# Patient Record
Sex: FEMALE | Race: WHITE | NOT HISPANIC OR LATINO | Employment: UNEMPLOYED | URBAN - METROPOLITAN AREA
[De-identification: names, ages, dates, MRNs, and addresses within clinical notes are randomized per-mention and may not be internally consistent; named-entity substitution may affect disease eponyms.]

---

## 2017-01-05 ENCOUNTER — ALLSCRIPTS OFFICE VISIT (OUTPATIENT)
Dept: OTHER | Facility: OTHER | Age: 27
End: 2017-01-05

## 2017-01-05 DIAGNOSIS — N64.4 MASTODYNIA: ICD-10-CM

## 2017-01-05 DIAGNOSIS — N39.0 URINARY TRACT INFECTION: ICD-10-CM

## 2017-01-05 DIAGNOSIS — G43.109 MIGRAINE WITH AURA AND WITHOUT STATUS MIGRAINOSUS, NOT INTRACTABLE: ICD-10-CM

## 2017-01-05 DIAGNOSIS — R11.0 NAUSEA: ICD-10-CM

## 2017-01-10 ENCOUNTER — ALLSCRIPTS OFFICE VISIT (OUTPATIENT)
Dept: OTHER | Facility: OTHER | Age: 27
End: 2017-01-10

## 2017-01-10 ENCOUNTER — APPOINTMENT (OUTPATIENT)
Dept: LAB | Facility: HOSPITAL | Age: 27
End: 2017-01-10
Payer: COMMERCIAL

## 2017-01-10 DIAGNOSIS — N39.0 URINARY TRACT INFECTION: ICD-10-CM

## 2017-01-10 DIAGNOSIS — R11.0 NAUSEA: ICD-10-CM

## 2017-01-10 LAB
BACTERIA UR QL AUTO: ABNORMAL /HPF
BILIRUB UR QL STRIP: NEGATIVE
CLARITY UR: ABNORMAL
COLOR UR: YELLOW
GLUCOSE UR STRIP-MCNC: NEGATIVE MG/DL
HGB UR QL STRIP.AUTO: NEGATIVE
KETONES UR STRIP-MCNC: NEGATIVE MG/DL
LEUKOCYTE ESTERASE UR QL STRIP: ABNORMAL
NITRITE UR QL STRIP: NEGATIVE
NON-SQ EPI CELLS URNS QL MICRO: ABNORMAL /HPF
PH UR STRIP.AUTO: 5.5 [PH] (ref 4.5–8)
PROT UR STRIP-MCNC: NEGATIVE MG/DL
RBC #/AREA URNS AUTO: ABNORMAL /HPF
SP GR UR STRIP.AUTO: 1.02 (ref 1–1.03)
UROBILINOGEN UR QL STRIP.AUTO: 0.2 E.U./DL
WBC #/AREA URNS AUTO: ABNORMAL /HPF

## 2017-01-10 PROCEDURE — 81001 URINALYSIS AUTO W/SCOPE: CPT

## 2017-01-10 PROCEDURE — 87086 URINE CULTURE/COLONY COUNT: CPT

## 2017-01-11 ENCOUNTER — GENERIC CONVERSION - ENCOUNTER (OUTPATIENT)
Dept: OTHER | Facility: OTHER | Age: 27
End: 2017-01-11

## 2017-01-12 LAB — BACTERIA UR CULT: NORMAL

## 2017-01-17 ENCOUNTER — ALLSCRIPTS OFFICE VISIT (OUTPATIENT)
Dept: OTHER | Facility: OTHER | Age: 27
End: 2017-01-17

## 2017-01-30 ENCOUNTER — ALLSCRIPTS OFFICE VISIT (OUTPATIENT)
Dept: OTHER | Facility: OTHER | Age: 27
End: 2017-01-30

## 2017-02-22 ENCOUNTER — ALLSCRIPTS OFFICE VISIT (OUTPATIENT)
Dept: OTHER | Facility: OTHER | Age: 27
End: 2017-02-22

## 2017-02-22 DIAGNOSIS — E53.8 DEFICIENCY OF OTHER SPECIFIED B GROUP VITAMINS: ICD-10-CM

## 2017-04-05 ENCOUNTER — ALLSCRIPTS OFFICE VISIT (OUTPATIENT)
Dept: OTHER | Facility: OTHER | Age: 27
End: 2017-04-05

## 2017-04-12 ENCOUNTER — ALLSCRIPTS OFFICE VISIT (OUTPATIENT)
Dept: RADIOLOGY | Facility: MEDICAL CENTER | Age: 27
End: 2017-04-12
Payer: COMMERCIAL

## 2017-04-19 ENCOUNTER — ALLSCRIPTS OFFICE VISIT (OUTPATIENT)
Dept: OTHER | Facility: OTHER | Age: 27
End: 2017-04-19

## 2017-05-01 ENCOUNTER — GENERIC CONVERSION - ENCOUNTER (OUTPATIENT)
Dept: OTHER | Facility: OTHER | Age: 27
End: 2017-05-01

## 2017-05-03 ENCOUNTER — ALLSCRIPTS OFFICE VISIT (OUTPATIENT)
Dept: OTHER | Facility: OTHER | Age: 27
End: 2017-05-03

## 2017-05-03 DIAGNOSIS — G43.111 INTRACTABLE MIGRAINE WITH AURA WITH STATUS MIGRAINOSUS: ICD-10-CM

## 2017-05-03 DIAGNOSIS — R47.81 SLURRED SPEECH: ICD-10-CM

## 2017-05-03 DIAGNOSIS — R20.2 PARESTHESIA OF SKIN: ICD-10-CM

## 2017-05-05 ENCOUNTER — TRANSCRIBE ORDERS (OUTPATIENT)
Dept: ADMINISTRATIVE | Facility: HOSPITAL | Age: 27
End: 2017-05-05

## 2017-05-05 ENCOUNTER — ALLSCRIPTS OFFICE VISIT (OUTPATIENT)
Dept: OTHER | Facility: OTHER | Age: 27
End: 2017-05-05

## 2017-05-05 DIAGNOSIS — G43.111 MIGRAINE WITH AURA, WITH INTRACTABLE MIGRAINE, SO STATED, WITH STATUS MIGRAINOSUS: Primary | ICD-10-CM

## 2017-05-05 DIAGNOSIS — M25.422 EFFUSION OF LEFT ELBOW: ICD-10-CM

## 2017-05-05 DIAGNOSIS — R20.2 PARESTHESIA: ICD-10-CM

## 2017-05-05 DIAGNOSIS — M25.522 PAIN IN LEFT ELBOW: ICD-10-CM

## 2017-05-10 ENCOUNTER — ALLSCRIPTS OFFICE VISIT (OUTPATIENT)
Dept: OTHER | Facility: OTHER | Age: 27
End: 2017-05-10

## 2017-05-12 ENCOUNTER — HOSPITAL ENCOUNTER (OUTPATIENT)
Dept: MRI IMAGING | Facility: HOSPITAL | Age: 27
Discharge: HOME | End: 2017-05-12
Payer: COMMERCIAL

## 2017-05-12 DIAGNOSIS — R20.2 PARESTHESIA OF SKIN: ICD-10-CM

## 2017-05-12 DIAGNOSIS — R47.81 SLURRED SPEECH: ICD-10-CM

## 2017-05-12 DIAGNOSIS — G43.111 INTRACTABLE MIGRAINE WITH AURA WITH STATUS MIGRAINOSUS: ICD-10-CM

## 2017-05-12 PROCEDURE — A9585 GADOBUTROL INJECTION: HCPCS | Performed by: FAMILY MEDICINE

## 2017-05-12 PROCEDURE — 70553 MRI BRAIN STEM W/O & W/DYE: CPT

## 2017-05-12 RX ADMIN — GADOBUTROL 8 ML: 604.72 INJECTION INTRAVENOUS at 21:53

## 2017-05-16 ENCOUNTER — GENERIC CONVERSION - ENCOUNTER (OUTPATIENT)
Dept: OTHER | Facility: OTHER | Age: 27
End: 2017-05-16

## 2017-05-18 ENCOUNTER — ALLSCRIPTS OFFICE VISIT (OUTPATIENT)
Dept: OTHER | Facility: OTHER | Age: 27
End: 2017-05-18

## 2017-05-25 ENCOUNTER — ALLSCRIPTS OFFICE VISIT (OUTPATIENT)
Dept: OTHER | Facility: OTHER | Age: 27
End: 2017-05-25

## 2017-06-22 ENCOUNTER — ALLSCRIPTS OFFICE VISIT (OUTPATIENT)
Dept: OTHER | Facility: OTHER | Age: 27
End: 2017-06-22

## 2017-06-26 ENCOUNTER — ALLSCRIPTS OFFICE VISIT (OUTPATIENT)
Dept: OTHER | Facility: OTHER | Age: 27
End: 2017-06-26

## 2017-06-26 PROCEDURE — G0145 SCR C/V CYTO,THINLAYER,RESCR: HCPCS | Performed by: NURSE PRACTITIONER

## 2017-06-26 PROCEDURE — 87624 HPV HI-RISK TYP POOLED RSLT: CPT | Performed by: NURSE PRACTITIONER

## 2017-06-27 ENCOUNTER — LAB REQUISITION (OUTPATIENT)
Dept: LAB | Facility: HOSPITAL | Age: 27
End: 2017-06-27
Payer: COMMERCIAL

## 2017-06-27 DIAGNOSIS — R87.629: ICD-10-CM

## 2017-06-29 LAB — HPV RRNA GENITAL QL NAA+PROBE: NORMAL

## 2017-07-06 LAB
LAB AP GYN PRIMARY INTERPRETATION: NORMAL
Lab: NORMAL

## 2017-08-16 ENCOUNTER — GENERIC CONVERSION - ENCOUNTER (OUTPATIENT)
Dept: OTHER | Facility: OTHER | Age: 27
End: 2017-08-16

## 2017-08-21 ENCOUNTER — ALLSCRIPTS OFFICE VISIT (OUTPATIENT)
Dept: OTHER | Facility: OTHER | Age: 27
End: 2017-08-21

## 2017-08-21 DIAGNOSIS — G43.109 MIGRAINE WITH AURA AND WITHOUT STATUS MIGRAINOSUS, NOT INTRACTABLE: ICD-10-CM

## 2017-08-21 DIAGNOSIS — E78.5 HYPERLIPIDEMIA: ICD-10-CM

## 2017-08-21 DIAGNOSIS — E53.8 DEFICIENCY OF OTHER SPECIFIED B GROUP VITAMINS: ICD-10-CM

## 2017-08-21 DIAGNOSIS — F41.9 ANXIETY DISORDER: ICD-10-CM

## 2017-08-29 ENCOUNTER — ALLSCRIPTS OFFICE VISIT (OUTPATIENT)
Dept: OTHER | Facility: OTHER | Age: 27
End: 2017-08-29

## 2017-09-19 ENCOUNTER — GENERIC CONVERSION - ENCOUNTER (OUTPATIENT)
Dept: OTHER | Facility: OTHER | Age: 27
End: 2017-09-19

## 2017-09-25 ENCOUNTER — ALLSCRIPTS OFFICE VISIT (OUTPATIENT)
Dept: OTHER | Facility: OTHER | Age: 27
End: 2017-09-25

## 2017-09-25 ENCOUNTER — GENERIC CONVERSION - ENCOUNTER (OUTPATIENT)
Dept: OTHER | Facility: OTHER | Age: 27
End: 2017-09-25

## 2017-10-10 ENCOUNTER — GENERIC CONVERSION - ENCOUNTER (OUTPATIENT)
Dept: OTHER | Facility: OTHER | Age: 27
End: 2017-10-10

## 2017-10-18 ENCOUNTER — ALLSCRIPTS OFFICE VISIT (OUTPATIENT)
Dept: OTHER | Facility: OTHER | Age: 27
End: 2017-10-18

## 2017-10-19 NOTE — PROGRESS NOTES
Assessment  1  Migraine with aura and without status migrainosus, not intractable (346 00) (G43 109)    Plan  Migraine with aura and without status migrainosus, not intractable    · Eletriptan Hydrobromide 40 MG Oral Tablet (Relpax); TAKE 1 TABLET AT ONSET  OF MIGRAINE  MAY REPEAT IN  2 HOURS  MAX 2 DOSES/24 HOURS, NO MORE THAN  3 DAYS PER WEEK  · PredniSONE 10 MG Oral Tablet; Take 6 pills x 3 days, 4 pills x 3 days, 2 pills x  3 days, then 1 pill x 3 days   · Demerol 100 MG/2ML Injection Solution; 100MG IM; To Be Done: 17OSJ5313   · Follow Up if Not Better Evaluation and Treatment  Follow-up  Status: Complete  Done:  92SER6020 10:28AM    Discussion/Summary  Possible side effects of new medications were reviewed with the patient/guardian today  The treatment plan was reviewed with the patient/guardian  The patient/guardian understands and agrees with the treatment plan      Chief Complaint  migraine x several days  pt had depakote called in, but made her schizi  History of Present Illness  HPI: Patient is here with headache over the past week  Pain bilaterally  Can be throbbing/pulsatile or stabbing  No diplopia  Patient with left eye redness intermittently and pain  No vomiting  No fever  No other URI symptoms  Mild photophobia  Patient tried sumatriptan and naproxen without any improvement  Patient tried Depakote and had side effects  Patient stopped Depakote  No alcohol or drug use  Review of Systems    Constitutional: as noted in HPI    ENT: as noted in HPI  Cardiovascular: no complaints of slow or fast heart rate, no chest pain, no palpitations, no leg claudication or lower extremity edema  Respiratory: no complaints of shortness of breath, no wheezing, no dyspnea on exertion, no orthopnea or PND  Breasts: no complaints of breast pain, breast lump or nipple discharge  Gastrointestinal: as noted in HPI     Genitourinary: no complaints of dysuria, no incontinence, no pelvic pain, no dysmenorrhea, no vaginal discharge or abnormal vaginal bleeding  Musculoskeletal: as noted in HPI  Integumentary: no complaints of skin rash or lesion, no itching or dry skin, no skin wounds  Neurological: headache, but-- as noted in HPI  Active Problems  1  Abdominal pain (789 00) (R10 9)   2  Abnormal Pap smear of vagina (795 10) (R87 629)   3  Acute bronchitis (466 0) (J20 9)   4  Acute maxillary sinusitis, recurrence not specified (461 0) (J01 00)   5  Allergy to insect bites and stings (V15 06) (Z91 038)   6  Anxiety disorder (300 00) (F41 9)   7  Bacterial vaginosis (616 10,041 9) (N76 0,B96 89)   8  Breast pain (611 71) (N64 4)   9  Candidiasis (112 9) (B37 9)   10  Cat bite (879 8,E906 3) (W55 01XA)   11  Cellulitis (682 9) (L03 90)   12  Cervical radiculitis (723 4) (M54 12)   13  Chronic migraine (346 70) (G43 709)   14  Constipation (564 00) (K59 00)   15  Contact dermatitis (692 9) (L25 9)   16  Counseling for sexually transmitted disease (V65 45) (Z70 8)   17  Dehydration (276 51) (E86 0)   18  Dermatitis (692 9) (L30 9)   19  Diarrhea (787 91) (R19 7)   20  Difficulty voiding (788 99) (R39 198)   21  Dizziness (780 4) (R42)   22  Encounter for counseling regarding initiation of other contraceptive measure (V25 02)    (Z30 09)   23  Encounter for gynecological examination with Papanicolaou smear of cervix (V72 31)    (Z01 419)   24  Encounter for gynecological examination without abnormal finding (V72 31) (Z01 419)   25  Essential tremor (333 1) (G25 0)   26  Foot Pain (Soft Tissue) (729 5)   27  Hair loss (704 00) (L65 9)   28  Headache (784 0) (R51)   29  Hematuria (599 70) (R31 9)   30  Hip pain, left (719 45) (M25 552)   31  House dust mite allergy (V15 09) (Z91 09)   32  Hyperlipidemia (272 4) (E78 5)   33  Incomplete emptying of bladder (788 21) (R33 9)   34  Infected tooth (522 4) (K04 7)   35  Irregular menses (626 4) (N92 6)   36  Left elbow pain (719 42) (M25 522)   37   Local reaction to insect sting, initial encounter   38  Lumbar disc herniation (722 10) (M51 26)   39  Lumbar pain (724 2) (M54 5)   40  Lumbar radiculopathy (724 4) (M54 16)   41  Migraine (346 90) (G43 909)   42  Migraine with aura and without status migrainosus, not intractable (346 00) (G43 109)   43  Muscle pain, myofascial (729 1) (M79 1)   44  Nausea (787 02) (R11 0)   45  Need for HPV vaccination (V04 89) (Z23)   46  Obesity (278 00) (E66 9)   47  Persistent headaches (784 0) (R51)   48  Pigmented nevus (216 9) (D22 9)   49  Pilar cysts (704 41) (L72 11)   50  Premenstrual tension syndrome (625 4) (N94 3)   51  Sacroiliitis (720 2) (M46 1)   52  Seasonal allergies (477 9) (J30 2)   53  Shoulder strain (840 9) (S46 919A)   54  Skin nodule (782 2) (R22 9)   55  Slurred speech (784 59) (R47 81)   56  Suprapubic pain, acute (789 09,338 19) (R10 2)   57  Swelling of left elbow (719 02) (M25 422)   58  Tingling (782 0) (R20 2)   59  Trapezius muscle spasm (728 85) (M62 838)   60  Ulcerative colitis without complications (009 3) (F18 24)   61  Upper respiratory infection (465 9) (J06 9)   62  Urgency of urination (788 63) (R39 15)   63  Urinary frequency (788 41) (R35 0)   64  Urinary tract infection (599 0) (N39 0)   65  URTI (acute upper respiratory infection) (465 9) (J06 9)   66  Uses birth control (V25 9) (Z30 9)   67  Vaginal candidiasis (112 1) (B37 3)   68  Vaginal discharge (623 5) (N89 8)   69  Vitamin B12 deficiency (266 2) (E53 8)   70  Vitamin D deficiency (268 9) (E55 9)   71  Voiding dysfunction (599 9) (N39 8)   72  Weight gain (783 1) (R63 5)    Past Medical History  1  History of Depression (311) (F32 9)   2  History of Ear infection (382 9) (H66 90)   3  History of seasonal allergies (V15 09) (Z88 9)   4  History of tonsillitis (V12 69) (Z87 09)   5  History of Sinus pain (478 19) (J34 89)   6  History of Strain of left biceps (840 8) (S46 212A)   7   History of Strain of trapezius muscle, left, initial encounter (840 8) (J43 925Y)  Active Problems And Past Medical History Reviewed: The active problems and past medical history were reviewed and updated today  Family History  Father    1  Family history of    2  Family history of Family Health Status Of Father -    3  Family history of Sjogren's disease (V17 89) (Z82 69)  Paternal Grandmother    3  Family history of   Maternal Grandfather    5  Family history of   Paternal Grandfather    10  Family history of   Other    7  Family history of Cancer   8  Family history of Diabetes   9  Family history of High blood pressure    Social History   · High school graduate   · Never A Smoker   · Never Drank Alcohol   · No illicit drug use   · Single   · Uses birth control (V25 9) (Z30 9)    Surgical History  1  History of Oral Surgery   2  History of Tonsillectomy    Current Meds   1  Botox 100 UNIT Injection Solution Reconstituted; INJECT 200  UNIT Other; Therapy: 03Iff8402 to (Evaluate:2017)  Requested for: 78Hdp4521; Last   Rx:52Hhb2386 Ordered   2  Gabapentin 300 MG Oral Capsule; TAKE 2 CAPSULE Bedtime; Therapy: 03XDK3374 to (Evaluate:45Wvi4655)  Requested for: 73Wyn0663; Last   Rx:55Thy7244 Ordered   3  LORazepam 0 5 MG Oral Tablet; TAKE 1/2 TO 1 TABLET TWICE DAILY AS NEEDED; Therapy: 99ODC9381 to (Evaluate:2016); Last Rx:65Kdk0310 Ordered   4  Montelukast Sodium 10 MG Oral Tablet; TAKE 1 TABLET DAILY  Requested for:   38Noz1052; Last Rx:05Syt9847 Ordered   5  Naproxen 500 MG Oral Tablet; TAKE 1 TABLET TWICE DAILY; Therapy: 98YHD9664 to (Evaluate:2017)  Requested for: 95Htz3352; Last   Rx:38Auo2933 Ordered   6  NuvaRing 0 12-0 015 MG/24HR Vaginal Ring; One ring per vagina x 3 weeks, remove x   4 days, repeat; Therapy: 38JIE3969 to (Evaluate:2018)  Requested for: 36EMK0836; Last   Rx:2017 Ordered   7  OLANZapine 5 MG Oral Tablet; 1 po qd x 5 days;    Therapy: 81MLH5846 to [phone]) Requested for: 54KCZ4700; Last   Rx:73Dfg5067 Ordered   8  Ondansetron 4 MG Oral Tablet Disintegrating; Take 1 by mouth every eight hours as   needed for nausea; Therapy: 63Ksr4710 to (Last Rx:62Lns7165)  Requested for: 93Cil8864 Ordered   9  Prochlorperazine Maleate 10 MG Oral Tablet; TAKE 1 TABLET EVERY 6 HOURS AS   NEEDED FOR migraine, Max 3/day, Max 3X/week; Therapy: 55WBR8735 to (Evaluate:62Wvn3751)  Requested for: 19TLI3856; Last   Rx:22Jun2017 Ordered   10  Promethazine HCl 25 MG/ML Injection Solution; INJECT INTRAMUSCULARLY AS    DIRECTED; To Be Done: 69ENS2618; Status: HOLD FOR - Administration Ordered   11  Propranolol HCl ER 60 MG Oral Capsule Extended Release 24 Hour; Therapy: 65KKB6257 to Recorded   12  Sertraline HCl - 100 MG Oral Tablet; TAKE 2 TABLETS DAILY; Therapy: 99IKM3798 to (Rosaura Reilly)  Requested for: 35Jrb6625; Last    Rx:51Nfg3012 Ordered   13  SUMAtriptan Succinate 100 MG Oral Tablet; TAKE 1 TABLET FOR MIGRAINE RELIEF     MAY REPEAT 2 HOURS LATER  MAXIMUM 200MG/DAY; Therapy: 03Eod1317 to (Last Renetta Dasilva)  Requested for: 98Ojc8753 Ordered   14  Topiramate 50 MG Oral Tablet; TAKE 3 TABLET Bedtime; Therapy: 79STY4042 to (QYHITWHQ:83MAB7594)  Requested for: 89LQY9188; Last    Rx:71Wus0043 Ordered   15  Vitamin D 2000 UNIT Oral Capsule; take 1 capsule daily; Therapy: 42KEL6597 to Recorded   16  Vitamin D3 78370 UNIT Oral Capsule; TAKE 1 CAPSULE Weekly; Therapy: 38DNT7298 to (Last Rx:88Dpt5445)  Requested for: 44Eek8416 Ordered    The medication list was reviewed and updated today  Allergies  1  Depakote TBEC   2  Pristiq TB24    Vitals   Recorded: 41TPJ8878 99:56SU   Systolic 90    Diastolic 70    Patient Refused Height Yes Yes   Weight Unobtainable Yes      Physical Exam    Constitutional   General appearance: Abnormal     Eyes   Conjunctiva and lids: No swelling, erythema or discharge  Pupils and irises: Equal, round and reactive to light      Ears, Nose, Mouth, and Throat   External inspection of ears and nose: Normal     Otoscopic examination: Tympanic membranes translucent with normal light reflex  Canals patent without erythema  Nasal mucosa, septum, and turbinates: Normal without edema or erythema  Oropharynx: Normal with no erythema, edema, exudate or lesions  Pulmonary   Respiratory effort: No increased work of breathing or signs of respiratory distress  Auscultation of lungs: Clear to auscultation  Cardiovascular   Palpation of heart: Normal PMI, no thrills  Auscultation of heart: Normal rate and rhythm, normal S1 and S2, without murmurs  Examination of extremities for edema and/or varicosities: Normal     Lymphatic   Palpation of lymph nodes in neck: No lymphadenopathy  Musculoskeletal   Gait and station: Normal     Digits and nails: Normal without clubbing or cyanosis  Inspection/palpation of joints, bones, and muscles: Normal     Skin   Skin and subcutaneous tissue: Normal without rashes or lesions  Neurologic   Cranial nerves: Cranial nerves 2-12 intact  Reflexes: 2+ and symmetric  Sensation: No sensory loss      Psychiatric   Orientation to person, place, and time: Normal     Mood and affect: Normal          Future Appointments    Date/Time Provider Specialty Site   03/27/2018 10:00 AM Bonnie Dennison MD Neurology ST [address] [facility]   11/28/2017 08:30 AM Jairo Lowe [facility] Neurology [facility]  [address] [facility]   01/29/2018 09:45 AM TENZIN Cosby Pain Management [facility] SPINE   12/22/2017 09:30 AM Tg Negrete DO Family Medicine  [address] [place] [address]     Signatures   Electronically signed by : Kaleb Palm DO; Oct 18 2017 10:28AM EST                       (Author)

## 2017-11-17 ENCOUNTER — GENERIC CONVERSION - ENCOUNTER (OUTPATIENT)
Dept: OTHER | Facility: OTHER | Age: 27
End: 2017-11-17

## 2017-11-28 ENCOUNTER — ALLSCRIPTS OFFICE VISIT (OUTPATIENT)
Dept: OTHER | Facility: OTHER | Age: 27
End: 2017-11-28

## 2017-11-29 NOTE — PROGRESS NOTES
Chief Complaint  Patient present for second Botox injection  Current Meds   1  Amoxicillin-Pot Clavulanate 875-125 MG Oral Tablet; Take one tablet twice daily for 7 days; Therapy: 83FFC7326 to (Last Rx:17Nov2017)  Requested for: 85OPR2022 Ordered   2  Botox 100 UNIT Injection Solution Reconstituted; INJECT 200  UNIT Other; Therapy: 15Paf0411 to (Evaluate:27Nov2017)  Requested for: 94Sfw4601; Last Rx:42Iby2099 Ordered   3  Bromfed DM 30-2-10 MG/5ML Oral Syrup; TAKE 5 ML EVERY 4 TO 6 HOURS AS NEEDED; Therapy: 86NQR5799 to (Igor Webb)  Requested for: 44WQJ7259; Last Rx:17Nov2017 Ordered   4  Eletriptan Hydrobromide 40 MG Oral Tablet; TAKE 1 TABLET AT ONSET OF MIGRAINE  MAY REPEAT IN  2 HOURS  MAX 2 DOSES/24 HOURS, NO MORE THAN 3 DAYS PER WEEK ; Therapy: 79IBP1568 to (Last Rx:18Oct2017)  Requested for: 49HKP0328 Ordered   5  Gabapentin 300 MG Oral Capsule; TAKE 2 CAPSULE Bedtime; Therapy: 55FQD1081 to (Evaluate:67Lpy6195)  Requested for: 03Dtd2231; Last Rx:93Ryp9028 Ordered   6  LORazepam 0 5 MG Oral Tablet; TAKE 1/2 TO 1 TABLET TWICE DAILY AS NEEDED; Therapy: 07REP3546 to (Evaluate:11Oct2016); Last Rx:65Nqx4696 Ordered   7  Montelukast Sodium 10 MG Oral Tablet; TAKE 1 TABLET DAILY  Requested for: 21Orr3406; Last Rx:28Dfc2577 Ordered   8  Naproxen 500 MG Oral Tablet; TAKE 1 TABLET TWICE DAILY; Therapy: 41FFL4013 to (Evaluate:35Akx7550)  Requested for: 91Vkz1495; Last Rx:13Cnl8704 Ordered   9  NuvaRing 0 12-0 015 MG/24HR Vaginal Ring; One ring per vagina x 3 weeks, remove x 4 days, repeat; Therapy: 99CNB6637 to (ZKICFRLF:12DJJ5687)  Requested for: 16MDS7020; Last QB:30JQH5483 Ordered   10  OLANZapine 5 MG Oral Tablet; 1 po qd x 5 days; Therapy: 01QIW1895 to [phone])  Requested for: 50SFB2697; Last  Rx:17Oct2017 Ordered   11  Ondansetron 4 MG Oral Tablet Disintegrating; Take 1 by mouth every eight hours as  needed for nausea;   Therapy: 51Ukk0730 to (Last Rx:14Xfm0470)  Requested for: 24Pmc9678 Ordered   12  PredniSONE 10 MG Oral Tablet; Take 6 pills x 3 days, 4 pills x 3 days, 2 pills x 3  days, then 1 pill x 3 days; Therapy: 76IJQ9097 to (Evaluate:17Nov2017)  Requested for: 06ZYJ5807; Last  Rx:18Oct2017 Ordered   13  Prochlorperazine Maleate 10 MG Oral Tablet; TAKE 1 TABLET EVERY 6 HOURS AS  NEEDED FOR migraine, Max 3/day, Max 3X/week; Therapy: 01OVK9569 to (Evaluate:78Dbg0119)  Requested for: 21XYR8796; Last  Rx:22Jun2017 Ordered   14  Promethazine HCl 25 MG/ML Injection Solution; INJECT INTRAMUSCULARLY AS  DIRECTED; To Be Done: 34VIK4532; Status: HOLD FOR - Administration Ordered   15  Propranolol HCl ER 60 MG Oral Capsule Extended Release 24 Hour; Therapy: 10GIX1177 to Recorded   16  Sertraline HCl - 100 MG Oral Tablet; TAKE 2 TABLETS DAILY; Therapy: 46VFE8168 to ()  Requested for: 70Rzs8866; Last  Rx:79Eej3274 Ordered   17  SUMAtriptan Succinate 100 MG Oral Tablet; TAKE 1 TABLET FOR MIGRAINE RELIEF  MAY  REPEAT 2 HOURS LATER  MAXIMUM 200MG/DAY; Therapy: 69Udt5573 to (Last Akilah Field)  Requested for: 00Wsr8123 Ordered   18  Topiramate 50 MG Oral Tablet; TAKE 3 TABLET Bedtime; Therapy: 57BGH4114 to (IQTGACSB:46JTY9781)  Requested for: 05AYE7323; Last  Rx:03Kdt6847 Ordered   19  Vitamin D 2000 UNIT Oral Capsule; take 1 capsule daily; Therapy: 74CVW1896 to Recorded   20  Vitamin D3 33415 UNIT Oral Capsule; TAKE 1 CAPSULE Weekly; Therapy: 29SEP0542 to (Last Rx:12Rsq9631)  Requested for: 98Awi6711 Ordered    Allergies  1  Depakote TBEC   2  Pristiq TB24    Vitals   Recorded: 59MJY6937 08:04AM   Temperature 98 3 F   Heart Rate 75   Respiration 16   Systolic 759, RUE, Sitting   Diastolic 62, RUE, Sitting   Weight 172 lb 4 oz   BMI Calculated 30 51   BSA Calculated 1 81   O2 Saturation 97       Procedure  Procedure: Headache botox injection  Indication: Chronic migraine headache  Were discussed with the patient-- and-- parent  Written consent was obtained prior to the procedure  The site was prepped with an alcohol swab  Anesthesia: No anesthesia was needed  Procedure Note: The patient was placed in the upright position  5 unit(s) was injected into the procerus muscle  5 unit(s) was injected into the  right  muscle  5 unit(s) was injected into the  left  muscle  10 unit(s) was injected into the  right frontalis muscle  10 unit(s) was injected into the  left frontalis muscle  20 unit(s) was injected into the  right temporalis muscle  20 unit(s) was injected into the  left temporalis muscle  15 unit(s) was injected into the  right occipitalis muscle  15 unit(s) was injected into the  left occipitalis muscle  10 unit(s) was injected into the  right cervical paraspinal muscle  10 unit(s) was injected into the  left cervical paraspinal muscle  15 unit(s) was injected into the  right trapezius muscle  15 unit(s) was injected into the  left trapezius muscle  A total of 155units were used  A total of 45units were discarded  Botox Lot:  Lot number: [id number] -- Expiration date: APW3194    100 units/2cc saline x2      Assessment  1  Chronic migraine (346 70) (G43 709)    Plan  Chronic migraine    · Botox 100 UNIT Injection Solution Reconstituted   Rx By: Earl KWONG;Chronic migraine; Dose of 200 UNIT; Intramuscular; GILBERTO = N; Administered: 11/28/2017 8:29:00 AM   · Chemodenervation of muscles innervated by facial, trigeminal, c-spine, accessorynerves - POC; Status:Need Information - Financial Authorization; Requestedfor:67Mcm2491;    Perform: In Office; [phone]; Ordered;migraine; Ordered By:Jacki Perez;   · Follow-up visit in 3 months Evaluation and Treatment  Follow-up  Status: Hold For -Scheduling  Requested for: 28SXX7344   Ordered; For: Chronic migraine; Ordered By: Aneesh Chase Performed:  Due: 35NHJ3502; Last Updated By: Krystin Hollis; 11/28/2017 8:45:45 AM  Migraine with aura and without status migrainosus, not intractable    · Topiramate 50 MG Oral Tablet; TAKE 3 TABLET Bedtime   Rx By: Prabhjot Britton; Dispense: 90 Days ; #:270 Tablet; Refill: 1;For: Migraine with aura and without status migrainosus, not intractable; GILBERTO = N; Verified Transmission to [address] [place] [address]; Last Updated By: System, [employer]; 11/28/2017 8:38:42 AM    Discussion/Summary    Pt prefers [place]  Future Appointments    Date/Time Provider Specialty Site   03/27/2018 10:00 AM Raisa Lyman MD Neurology ST [address]   01/29/2018 09:45 AM TENZIN Mendoza Pain Management [facility]   12/22/2017 09:30 AM Suraj Ma DO Family Medicine  [facility]       Signatures   Electronically signed by :  Claudean Hack, [facility]; Nov 28 2017  8:49AM EST                       (Author)    Electronically signed by : Suzette Jewell MD; Nov 28 2017  4:26PM EST                       (Author)

## 2017-12-06 ENCOUNTER — GENERIC CONVERSION - ENCOUNTER (OUTPATIENT)
Dept: OTHER | Facility: OTHER | Age: 27
End: 2017-12-06

## 2017-12-06 DIAGNOSIS — R19.7 DIARRHEA: ICD-10-CM

## 2017-12-06 DIAGNOSIS — R10.12 LEFT UPPER QUADRANT PAIN: ICD-10-CM

## 2017-12-06 DIAGNOSIS — R10.11 RIGHT UPPER QUADRANT PAIN: ICD-10-CM

## 2017-12-06 DIAGNOSIS — M94.0 CHONDROCOSTAL JUNCTION SYNDROME: ICD-10-CM

## 2017-12-26 ENCOUNTER — TRANSCRIBE ORDERS (OUTPATIENT)
Dept: ADMINISTRATIVE | Facility: HOSPITAL | Age: 27
End: 2017-12-26

## 2017-12-26 DIAGNOSIS — R10.13 EPIGASTRIC PAIN: Primary | ICD-10-CM

## 2018-01-10 ENCOUNTER — HOSPITAL ENCOUNTER (OUTPATIENT)
Dept: CT IMAGING | Facility: HOSPITAL | Age: 28
Discharge: HOME | End: 2018-01-10
Payer: COMMERCIAL

## 2018-01-10 DIAGNOSIS — M94.0 CHONDROCOSTAL JUNCTION SYNDROME: ICD-10-CM

## 2018-01-10 DIAGNOSIS — R10.11 RIGHT UPPER QUADRANT PAIN: ICD-10-CM

## 2018-01-10 DIAGNOSIS — R10.12 LEFT UPPER QUADRANT PAIN: ICD-10-CM

## 2018-01-10 PROCEDURE — 74160 CT ABDOMEN W/CONTRAST: CPT

## 2018-01-10 RX ADMIN — IOHEXOL 100 ML: 350 INJECTION, SOLUTION INTRAVENOUS at 16:29

## 2018-01-11 NOTE — MISCELLANEOUS
Signatures   Electronically signed by : TENZIN Sanchez; Feb 24 2016 11:52AM EST                       (Author)    Electronically signed by : Alberto Beyer DO; Feb 24 2016  1:04PM EST

## 2018-01-11 NOTE — RESULT NOTES
Verified Results  (Q) THINPREP TIS PAP RFX HPV 56UUZ9077 12:00AM Marianela Chanda     Test Name Result Flag Reference   CLINICAL INFORMATION:      none given   LMP:      NONE GIVEN   PREV  PAP:      NONE GIVEN   PREV  BX:      NONE GIVEN   SOURCE:      [facility]   STATEMENT OF ADEQUACY:      Satisfactory for evaluation  Endocervical/transformation zone component  present  Age and/or menstrual status not provided   GENERAL CATEGORIZATION:  A    EPITHELIAL CELL ABNORMALITY   INTERPRETATION/RESULT:  A    Low Grade Squamous Intraepithelial Lesion (LSIL)   COMMENT:      This Pap test has been evaluated with computer  assisted technology    Suggest clinical correlation and follow-up as  clinically appropriate   CYTOTECHNOLOGIST:      SPS,CT(ASCP)  Ct screening location: [address] [place] [address]   PATHOLOGIST:      Zoe Lopez MD, (electronic signature)  Boarded in Anatomic and Clinical Pathology,  Cytopathology

## 2018-01-12 VITALS
WEIGHT: 172.25 LBS | DIASTOLIC BLOOD PRESSURE: 62 MMHG | SYSTOLIC BLOOD PRESSURE: 104 MMHG | RESPIRATION RATE: 16 BRPM | HEART RATE: 75 BPM | TEMPERATURE: 98.3 F | OXYGEN SATURATION: 97 % | BODY MASS INDEX: 30.51 KG/M2

## 2018-01-12 VITALS
DIASTOLIC BLOOD PRESSURE: 70 MMHG | BODY MASS INDEX: 31.92 KG/M2 | SYSTOLIC BLOOD PRESSURE: 118 MMHG | TEMPERATURE: 98.2 F | HEIGHT: 63 IN | WEIGHT: 180.13 LBS

## 2018-01-12 VITALS
BODY MASS INDEX: 32.14 KG/M2 | DIASTOLIC BLOOD PRESSURE: 70 MMHG | HEIGHT: 63 IN | SYSTOLIC BLOOD PRESSURE: 116 MMHG | WEIGHT: 181.38 LBS | TEMPERATURE: 98.8 F

## 2018-01-12 NOTE — MISCELLANEOUS
Message   Recorded as Task   Date: 10/17/2016 10:18 AM, Created By: Vinh Mistry   Task Name: Med Renewal Request   Assigned To: [id number] [address] clinical,Team   Regarding Patient: Tina Kelley, Status: Active   Comment:    Keyana Herring - 17 Oct 2016 10:18 AM     TASK CREATED  Caller: Self; Renew Medication; [phone] (Home)  Pt called requesting a refill of gabapentin 300mg tid to be sent to [address] [place] [address]  Pt has an sovs on 12/14 w/[facility] [facility]  - [facility] - 17 Oct 2016 11:01 AM     TASK REPLIED TO: Previously Assigned To [address] clinical,Team                      please see her script, not due for refill until December  she should have refills, needs to call pharmacy   Keyana Herring - 17 Oct 2016 11:08 AM     TASK EDITED       Pt aware & will call pharmacy for a refill  Active Problems    1  Abdominal pain (789 00) (R10 9)   2  Abnormal Pap smear of vagina (795 10) (R87 629)   3  Acute bronchitis (466 0) (J20 9)   4  Acute maxillary sinusitis, recurrence not specified (461 0) (J01 00)   5  Allergy to insect bites and stings (V15 06) (Z91 038)   6  Anxiety disorder (300 00) (F41 9)   7  Bacterial vaginosis (616 10,041 9) (N76 0,B96 89)   8  Candidiasis (112 9) (B37 9)   9  Cat bite (879 8,E906 3) (W55 01XA)   10  Cellulitis (682 9) (L03 90)   11  Cervical radiculitis (723 4) (M54 12)   12  Constipation (564 00) (K59 00)   13  Contact dermatitis (692 9) (L25 9)   14  Counseling for sexually transmitted disease (V65 45) (Z70 8)   15  Dermatitis (692 9) (L30 9)   16  Diarrhea (787 91) (R19 7)   17  Difficulty voiding (788 99) (R39 198)   18  Dizziness (780 4) (R42)   19  Encounter for counseling regarding initiation of other contraceptive measure (V25 02)    (Z30 9)   20  Encounter for gynecological examination without abnormal finding (V72 31) (Z01 419)   21  Essential tremor (333 1) (G25 0)   22  Foot Pain (Soft Tissue) (729 5)   23  Hair loss (704 00) (L65 9)   24  Headache (784 0) (R51)   25  Hematuria (599 70) (R31 9)   26  Hip pain, left (719 45) (M25 552)   27  Hyperlipidemia (272 4) (E78 5)   28  Incomplete emptying of bladder (788 21) (R33 9)   29  Infected tooth (522 4) (K04 7)   30  Local reaction to insect sting, initial encounter   31  Lumbar disc herniation (722 10) (M51 26)   32  Lumbar pain (724 2) (M54 5)   33  Lumbar radiculopathy (724 4) (M54 16)   34  Muscle pain, myofascial (729 1) (M79 1)   35  Obesity (278 00) (E66 9)   36  Pigmented nevus (216 9) (D22 9)   37  Pilar cysts (704 41) (L72 11)   38  Premenstrual tension syndrome (625 4) (N94 3)   39  Seasonal allergies (477 9) (J30 2)   40  Shoulder strain (840 9) (S46 919A)   41  Skin nodule (782 2) (R22 9)   42  Strain of left biceps (840 8) (S46 112A)   43  Strain of trapezius muscle, left, initial encounter (840 8) (S46 812A)   44  Suprapubic pain, acute (789 09,338 19) (R10 2)   45  Trapezius muscle spasm (728 85) (M62 838)   46  Ulcerative colitis without complications (106 0) (S92 03)   47  Upper respiratory infection (465 9) (J06 9)   48  Urgency of urination (788 63) (R39 15)   49  Urinary frequency (788 41) (R35 0)   50  Urinary tract infection (599 0) (N39 0)   51  URTI (acute upper respiratory infection) (465 9) (J06 9)   52  Uses birth control (V25 9) (Z30 9)   53  Vaginal candidiasis (112 1) (B37 3)   54  Vaginal discharge (623 5) (N89 8)   55  Vitamin B12 deficiency (266 2) (E53 8)   56  Vitamin D deficiency (268 9) (E55 9)   57  Voiding dysfunction (599 9) (N39 8)   58  Weight gain (783 1) (R63 5)    Current Meds   1  Amoxicillin-Pot Clavulanate 875-125 MG Oral Tablet; TAKE 1 TABLET TWICE DAILY   AFTER MEALS; Therapy: 76HIE6377 to ([phone])  Requested for: 23FIY6884; Last   Rx:10Oct2016 Ordered   2  Betamethasone Valerate 0 1 % External Cream; APPLY SPARINGLY TO AFFECTED   AREA(S) ONCE DAILY; Therapy: 81VGD7320 to (Last Rx:26Vre1979)  Requested for: 14Mgy4382 Ordered   3  Fluticasone Propionate 50 MCG/ACT Nasal Suspension; USE 1 SPRAY IN EACH   NOSTRIL TWICE DAILY; Therapy: 69BIY4342 to (Evaluate:11Jan2017)  Requested for: 07NZF3119; Last   Rx:30Iac3832 Ordered   4  Gabapentin 300 MG Oral Capsule; TAKE ONE CAPSULE BY MOUTH THREE TIMES   DAILY; Therapy: 17HDR6787 to (Irene Iglesias)  Requested for: 69WON9599; Last   Rx:96Rac4143 Ordered   5  LORazepam 0 5 MG Oral Tablet; TAKE 1/2 TO 1 TABLET TWICE DAILY AS NEEDED; Therapy: 36RYX7214 to (Evaluate:11Oct2016); Last Rx:23Ink2035 Ordered   6  NuvaRing 0 12-0 015 MG/24HR Vaginal Ring; INSERT 1 RING VAGINALLY FOR 3   WEEKS THEN 1 WEEK OFF; Therapy: 30MMD3346 to (Last Dana Risser)  Requested for: 69HAJ0882 Ordered   7  Sertraline HCl - 100 MG Oral Tablet; TAKE 2 TABLETS DAILY; Therapy: 81INK1590 to (Evaluate:11Oct2016)  Requested for: 67FOU3026; Last   Rx:04Rta5158 Ordered   8  Simvastatin 10 MG Oral Tablet; TAKE 1 TABLET AT BEDTIME; Therapy: 65OQP0105 to (Brendon Samayoa)  Requested for: 83Cui9171; Last   Rx:80Uft1497 Ordered   9  SUMAtriptan Succinate 100 MG Oral Tablet; TAKE 1 TABLET FOR MIGRAINE RELIEF    MAY REPEAT 2 HOURS LATER  MAXIMUM 200MG/DAY; Therapy: 40Xtp8771 to (Last Rx:10Oct2016)  Requested for: 16HAA1467 Ordered   10  Vitamin D 2000 UNIT Oral Capsule; take 1 capsule daily; Therapy: 79GWR8219 to Recorded   11  Vitamin D3 45635 UNIT Oral Capsule; TAKE 1 CAPSULE Weekly; Therapy: 88HDM2541 to (Last Rx:52Jdb0320)  Requested for: 99Obd7731 Ordered    Allergies    1   Pristiq TB24    Signatures   Electronically signed by : Tariq Watt RN; Oct 17 2016 11:08AM EST                       (Author)

## 2018-01-12 NOTE — PROGRESS NOTES
Assessment    1  Bacterial vaginosis (616 10,041 9) (N76 0,A49 9)   2  Premenstrual tension syndrome (625 4) (N94 3)   3  Encounter for counseling regarding initiation of other contraceptive measure (V25 02)   (Z30 9)    Plan  Bacterial vaginosis    · Tinidazole 500 MG Oral Tablet; TAKE 2 TABLET Daily   Rx By: Hortensia Mock; Dispense: 3 Days ; #:6 Tablet; Refill: 0; For: Bacterial vaginosis; GILBERTO = N; Sent To: [facility]  Candidiasis, Encounter for counseling regarding initiation of other contraceptive  measure    · Sabrina Lanier; Status:Resulted - Requires Verification;   Done: 91BDN4053 03:07PM   Performed: In Office; UCV:96YEB3429;HRCIUQQ; Today; For:Candidiasis, Encounter for counseling regarding initiation of other contraceptive measure; Ordered By:Laura Hollis;    Discussion/Summary  Discussion Summary:   Discussed other options of hormonal contraception including LARC's  Pt desires trial of Nuvaring  Pt given instructions on use  Will start with Nuvaring samples  Pt also to start Tindamax  2 samples given and pt to  the remainder  Affirm sent  Chief Complaint  Chief Complaint Free Text Note Form: Pt presents today for an infection and new ocp  History of Present Illness  HPI: Pt here c/o recurrent bacterial infection  Pt reports discharge is yellowish and has an odor  States that flagyl took care of the last infection but it returned  States that sx's are better than last time  Pt also wants to change bcp's, thinks it is triggering menstrual migraines and mood changes, wants something more "stable"  Same sexual partner  No other changes in diet or activity  Review of Systems  Focused-Female:   Constitutional: No fever, no chills, feels well, no tiredness, no recent weight gain or loss  ENT: no ear ache, no loss of hearing, no nosebleeds or nasal discharge, no sore throat or hoarseness     Cardiovascular: no complaints of slow or fast heart rate, no chest pain, no palpitations, no leg claudication or lower extremity edema  Respiratory: no complaints of shortness of breath, no wheezing, no dyspnea on exertion, no orthopnea or PND  Breasts: no complaints of breast pain, breast lump or nipple discharge  Gastrointestinal: no complaints of abdominal pain, no constipation, no nausea or diarrhea, no vomiting, no bloody stools  Genitourinary: no complaints of dysuria, no incontinence, no pelvic pain, no dysmenorrhea, no vaginal discharge or abnormal vaginal bleeding and as noted in HPI  Musculoskeletal: no complaints of arthralgia, no myalgia, no joint swelling or stiffness, no limb pain or swelling  Integumentary: no complaints of skin rash or lesion, no itching or dry skin, no skin wounds  Neurological: no complaints of headache, no confusion, no numbness or tingling, no dizziness or fainting  ROS Reviewed:   ROS reviewed  Active Problems    1  Abdominal pain (789 00) (R10 9)   2  Acute bronchitis (466 0) (J20 9)   3  Allergy to insect bites and stings (V15 06) (Z91 038)   4  Anxiety disorder (300 00) (F41 9)   5  Bacterial vaginosis (616 10,041 9) (N76 0,A49 9)   6  Candidiasis (112 9) (B37 9)   7  Cellulitis (682 9) (L03 90)   8  Constipation (564 00) (K59 00)   9  Dermatitis (692 9) (L30 9)   10  Diarrhea (787 91) (R19 7)   11  Difficulty voiding (788 99) (R39 89)   12  Dizziness (780 4) (R42)   13  Encounter for counseling regarding initiation of other contraceptive measure (V25 02)    (Z30 9)   14  Essential tremor (333 1) (G25 0)   15  Foot Pain (Soft Tissue) (729 5)   16  Hematuria (599 70) (R31 9)   17  Hip pain, left (719 45) (M25 552)   18  Hyperlipidemia (272 4) (E78 5)   19  Incomplete emptying of bladder (788 21) (R33 9)   20  Infected tooth (522 4) (K04 7)   21  Local reaction to insect sting, initial encounter   22  Lumbar disc herniation (722 10) (M51 26)   23  Lumbar pain (724 2) (M54 5)   24  Lumbar radiculopathy (724 4) (M54 16)   25   Muscle pain, myofascial (729 1) (M79 7)   26  Pigmented nevus (216 9) (D22 9)   27  Pilar cysts (704 41) (L72 11)   28  Premenstrual tension syndrome (625 4) (N94 3)   29  Seasonal allergies (477 9) (J30 2)   30  Skin nodule (782 2) (R22 9)   31  Suprapubic pain, acute (789 09,338 19) (R10 30)   32  Ulcerative colitis without complications (371 8) (G24 06)   33  Upper respiratory infection (465 9) (J06 9)   34  Urgency of urination (788 63) (R39 15)   35  Urinary frequency (788 41) (R35 0)   36  Urinary tract infection (599 0) (N39 0)   37  URTI (acute upper respiratory infection) (465 9) (J06 9)   38  Uses birth control (V25 9) (Z30 9)   39  Vaginal candidiasis (112 1) (B37 3)   40  Vaginal discharge (623 5) (N89 8)   41  Vitamin B12 deficiency (266 2) (E53 8)   42  Vitamin D deficiency (268 9) (E55 9)   43  Voiding dysfunction (599 9) (N39 8)    Past Medical History    1  History of Depression (311) (F32 9)   2  History of Ear infection (382 9) (H66 90)   3  History of seasonal allergies (V15 09) (Z88 9)   4  History of tonsillitis (V12 69) (Z87 09)   5  History of Sinus pain (478 19) (J34 89)  Active Problems And Past Medical History Reviewed: The active problems and past medical history were reviewed and updated today  Surgical History    1  History of Oral Surgery   2  History of Tonsillectomy  Surgical History Reviewed: The surgical history was reviewed and updated today  Family History    1  Family history of Family Health Status Of Father -     2  Family history of Cancer   3  Family history of Diabetes   4  Family history of High blood pressure  Family History Reviewed: The family history was reviewed and updated today  Social History    · Never A Smoker   · Never Drank Alcohol   · Uses birth control (V25 9) (Z30 9)  Social History Reviewed: The social history was reviewed and updated today  The social history was reviewed and is unchanged  Current Meds   1   Gabapentin 300 MG Oral Capsule; TAKE ONE CAPSULE BY MOUTH THREE TIMES   DAILY; Therapy: 80YES5999 to (Evaluate:62Mex6667)  Requested for: 56ZSC5028; Last   Rx:64Zge1685 Ordered   2  Jolessa 0 15-0 03 MG Oral Tablet; TAKE 1 TABLET DAILY; Therapy: 55KVI9195 to (Evaluate:95Qyk8213)  Requested for: 24ERV7889; Last   Rx:67Uqt1150 Ordered   3  Sertraline HCl - 100 MG Oral Tablet; TAKE 2 TABLETS DAILY; Therapy: 06LPN1962 to (96 641942)  Requested for: 74QLG5093; Last   LO:29QFZ4667 Ordered   4  Vitamin D 2000 UNIT Oral Capsule; take 1 capsule daily; Therapy: 14TLM0078 to Recorded   5  Vitamin D3 51017 UNIT Oral Capsule; TAKE 1 CAPSULE Weekly; Therapy: 47HGM8574 to (Last Rx:12Nov2015)  Requested for: 01ZUD1980 Ordered  Medication List Reviewed: The medication list was reviewed and updated today  Allergies    1  Pristiq TB24    Vitals  Vital Signs [Data Includes: Current Encounter]    Recorded: 69ABZ9046 84:73RP   Systolic 447   Diastolic 80   Height 5 ft 3 in   Weight 187 lb 0 96 oz   BMI Calculated 33 14   BSA Calculated 1 88     Physical Exam    Constitutional   General appearance: No acute distress, well appearing and well nourished  Genitourinary   External genitalia: Normal and no lesions appreciated  Vagina: Normal, no lesions or dryness appreciated  scant yellowish discharge  Urethra: Normal     Urethral meatus: Normal     Bladder: Normal, soft, non-tender and no prolapse or masses appreciated  Cervix: Normal, no palpable masses  Future Appointments    Date/Time Provider Specialty Site   01/26/2016 11:00 AM CHIQUITA Yost  Urology  [address] [facility] [address]   02/24/2016 11:30 AM TENZIN Dinh [facility]  [address] [facility]   03/07/2016 09:00 AM Carmelita Buck DO [facility]  [facility]     Signatures   Electronically signed by :  CHIQUITA Salinas ; Jan 21 2016  3:10PM EST                       (Author)

## 2018-01-13 VITALS
DIASTOLIC BLOOD PRESSURE: 62 MMHG | HEART RATE: 84 BPM | SYSTOLIC BLOOD PRESSURE: 98 MMHG | WEIGHT: 179 LBS | HEIGHT: 63 IN | BODY MASS INDEX: 31.71 KG/M2 | RESPIRATION RATE: 12 BRPM

## 2018-01-13 VITALS
SYSTOLIC BLOOD PRESSURE: 110 MMHG | BODY MASS INDEX: 32.62 KG/M2 | DIASTOLIC BLOOD PRESSURE: 72 MMHG | WEIGHT: 184.13 LBS | TEMPERATURE: 98.2 F | HEIGHT: 63 IN

## 2018-01-13 VITALS
WEIGHT: 174.5 LBS | SYSTOLIC BLOOD PRESSURE: 118 MMHG | DIASTOLIC BLOOD PRESSURE: 70 MMHG | HEIGHT: 64 IN | BODY MASS INDEX: 29.79 KG/M2

## 2018-01-13 VITALS
HEIGHT: 63 IN | WEIGHT: 184 LBS | BODY MASS INDEX: 32.6 KG/M2 | RESPIRATION RATE: 12 BRPM | SYSTOLIC BLOOD PRESSURE: 108 MMHG | HEART RATE: 72 BPM | DIASTOLIC BLOOD PRESSURE: 70 MMHG

## 2018-01-13 VITALS — BODY MASS INDEX: 31.64 KG/M2 | SYSTOLIC BLOOD PRESSURE: 116 MMHG | DIASTOLIC BLOOD PRESSURE: 76 MMHG | WEIGHT: 178.6 LBS

## 2018-01-13 VITALS
WEIGHT: 179 LBS | RESPIRATION RATE: 12 BRPM | DIASTOLIC BLOOD PRESSURE: 78 MMHG | BODY MASS INDEX: 31.71 KG/M2 | HEIGHT: 63 IN | SYSTOLIC BLOOD PRESSURE: 110 MMHG | HEART RATE: 87 BPM | OXYGEN SATURATION: 98 %

## 2018-01-13 VITALS — TEMPERATURE: 97.6 F | SYSTOLIC BLOOD PRESSURE: 100 MMHG | DIASTOLIC BLOOD PRESSURE: 78 MMHG

## 2018-01-13 VITALS
HEIGHT: 63 IN | WEIGHT: 182 LBS | TEMPERATURE: 98.1 F | BODY MASS INDEX: 32.25 KG/M2 | SYSTOLIC BLOOD PRESSURE: 110 MMHG | DIASTOLIC BLOOD PRESSURE: 82 MMHG

## 2018-01-13 VITALS
SYSTOLIC BLOOD PRESSURE: 120 MMHG | DIASTOLIC BLOOD PRESSURE: 76 MMHG | WEIGHT: 181 LBS | BODY MASS INDEX: 32.07 KG/M2 | HEIGHT: 63 IN

## 2018-01-13 NOTE — RESULT NOTES
Verified Results  (Q) BV-VAGINITIS PANEL DNA PROBE 69Zzo5201 12:00AM Rosann Schlatter     Test Name Result Flag Reference   BV/VAGINITIS [facility] PROBE  A    BV/VAGINITIS PANEL DNA PROBE         MICRO NUMBER:      [id number]    TEST STATUS:       FINAL    SPECIMEN SOURCE:   ENDOCERVICAL    SPECIMEN QUALITY:  ADEQUATE    TRICHOMONAS:       Not Detected    GARDNERELLA:       Detected  Increased levels of G  vaginalis may                       not be significant in the absence of signs and                       symptoms of bacterial vaginosis      CANDIDA:           Not Detected

## 2018-01-14 VITALS
BODY MASS INDEX: 31.76 KG/M2 | SYSTOLIC BLOOD PRESSURE: 110 MMHG | DIASTOLIC BLOOD PRESSURE: 70 MMHG | WEIGHT: 179.25 LBS | HEIGHT: 63 IN

## 2018-01-14 VITALS
WEIGHT: 184.5 LBS | HEIGHT: 63 IN | BODY MASS INDEX: 32.69 KG/M2 | DIASTOLIC BLOOD PRESSURE: 68 MMHG | SYSTOLIC BLOOD PRESSURE: 100 MMHG

## 2018-01-14 VITALS
HEIGHT: 63 IN | WEIGHT: 179 LBS | DIASTOLIC BLOOD PRESSURE: 66 MMHG | SYSTOLIC BLOOD PRESSURE: 102 MMHG | TEMPERATURE: 98.2 F | BODY MASS INDEX: 31.71 KG/M2

## 2018-01-14 VITALS — SYSTOLIC BLOOD PRESSURE: 90 MMHG | DIASTOLIC BLOOD PRESSURE: 70 MMHG

## 2018-01-15 VITALS
WEIGHT: 183.13 LBS | DIASTOLIC BLOOD PRESSURE: 72 MMHG | BODY MASS INDEX: 32.45 KG/M2 | TEMPERATURE: 99.1 F | HEIGHT: 63 IN | SYSTOLIC BLOOD PRESSURE: 112 MMHG

## 2018-01-15 NOTE — MISCELLANEOUS
Message   Recorded as Task   Date: 02/25/2016 01:17 PM, Created By: Karena Gillespie   Task Name: Med Renewal Request   Assigned To: [id number] [address] clinical,Team   Regarding Patient: Asiya Fuentes, Status: Active   CommentWinnialem Henderson - 25 Feb 2016 1:17 PM     TASK CREATED  T/c from patient needing to r/s NS appt on 2/24  R/s appt to 3/9 @ 9:45 w/ danni (next available)  Patient also mentions she will need a refill of gabapentin she currently has none  Please contact patient @ [phone]  Michelle Mohr - 25 Feb 2016 2:23 PM     TASK EDITED  s/w pt, states she was sick yesterday and missed her appt  Has enough gabapentin for tomorrow  Requesting a refill be sent to pharmacy per allscripts  Confirmed gabapentin 300 mg, 1 pill po tid  Advised pt, will d/w Dr Evan Solano and c/b to confirm  Pt verbalized understanding and appreciation  Coleen Peralta - 25 Feb 2016 3:21 PM     TASK EDITED  will send refill   Michelle Mohr - 25 Feb 2016 3:34 PM     TASK EDITED  pt aware        Active Problems    1  Abdominal pain (789 00) (R10 9)   2  Acute bronchitis (466 0) (J20 9)   3  Allergy to insect bites and stings (V15 06) (Z91 038)   4  Anxiety disorder (300 00) (F41 9)   5  Bacterial vaginosis (616 10,041 9) (N76 0,B96 89)   6  Candidiasis (112 9) (B37 9)   7  Cellulitis (682 9) (L03 90)   8  Constipation (564 00) (K59 00)   9  Dermatitis (692 9) (L30 9)   10  Diarrhea (787 91) (R19 7)   11  Difficulty voiding (788 99) (R39 19)   12  Dizziness (780 4) (R42)   13  Encounter for counseling regarding initiation of other contraceptive measure (V25 02)    (Z30 9)   14  Essential tremor (333 1) (G25 0)   15  Foot Pain (Soft Tissue) (729 5)   16  Hair loss (704 00) (L65 9)   17  Hematuria (599 70) (R31 9)   18  Hip pain, left (719 45) (M25 552)   19  Hyperlipidemia (272 4) (E78 5)   20  Incomplete emptying of bladder (788 21) (R33 9)   21  Infected tooth (522 4) (K04 7)   22   Local reaction to insect sting, initial encounter   23  Lumbar disc herniation (722 10) (M51 26)   24  Lumbar pain (724 2) (M54 5)   25  Lumbar radiculopathy (724 4) (M54 16)   26  Muscle pain, myofascial (729 1) (M79 1)   27  Pigmented nevus (216 9) (D22 9)   28  Pilar cysts (704 41) (L72 11)   29  Premenstrual tension syndrome (625 4) (N94 3)   30  Seasonal allergies (477 9) (J30 2)   31  Skin nodule (782 2) (R22 9)   32  Suprapubic pain, acute (789 09,338 19) (R10 2)   33  Ulcerative colitis without complications (699 1) (Z06 10)   34  Upper respiratory infection (465 9) (J06 9)   35  Urgency of urination (788 63) (R39 15)   36  Urinary frequency (788 41) (R35 0)   37  Urinary tract infection (599 0) (N39 0)   38  URTI (acute upper respiratory infection) (465 9) (J06 9)   39  Uses birth control (V25 9) (Z30 9)   40  Vaginal candidiasis (112 1) (B37 3)   41  Vaginal discharge (623 5) (N89 8)   42  Vitamin B12 deficiency (266 2) (E53 8)   43  Vitamin D deficiency (268 9) (E55 9)   44  Voiding dysfunction (599 9) (N39 8)    Current Meds   1  Amoxicillin 500 MG Oral Capsule; TAKE 2 CAPSULES TWICE DAILY; Therapy: 41OAB4367 to (Enriqueta Castillo)  Requested for: 64Fct5774; Last   Rx:81Aya6977 Ordered   2  Gabapentin 300 MG Oral Capsule; TAKE ONE CAPSULE BY MOUTH THREE TIMES   DAILY; Therapy: 68XXU3500 to (Evaluate:04Dlk1010)  Requested for: 36Jqi6158; Last   Rx:11Elm1883 Ordered   3  Sertraline HCl - 100 MG Oral Tablet; TAKE 2 TABLETS DAILY; Therapy: 70PSN2864 to (Juliana Ring)  Requested for: 68Jgk5212; Last   Rx:49Zyg8738 Ordered   4  Vitamin D 2000 UNIT Oral Capsule; take 1 capsule daily; Therapy: 79KIK8642 to Recorded   5  Vitamin D3 27169 UNIT Oral Capsule; TAKE 1 CAPSULE Weekly; Therapy: 40MKQ2708 to (Last Rx:12Nov2015)  Requested for: 83TSX4549 Ordered    Allergies    1   Pristiq TB24    Signatures   Electronically signed by : Tonya Barney, ; Feb 25 2016  3:34PM EST                       (Author)

## 2018-01-16 NOTE — RESULT NOTES
Message   Call patient  Patient with UTI  Start Bactrim DS one tablet twice daily for one week       Verified Results  (1) URINALYSIS (will reflex a microscopy if leukocytes, occult blood, protein or nitrites are not within normal limits) 35NJO8689 05:17PM Niko Montague Order Number: [id number]     Test Name Result Flag Reference   COLOR Yellow     CLARITY Turbid     SPECIFIC GRAVITY UA 1 023  1 003-1 030   PH UA 5 5  4 5-8 0   LEUKOCYTE ESTERASE UA Large A Negative   NITRITE UA Negative  Negative   PROTEIN UA Negative mg/dl  Negative   GLUCOSE UA Negative mg/dl  Negative   KETONES UA Negative mg/dl  Negative   UROBILINOGEN UA 0 2 E U /dl  0 2, 1 0 E U /dl   BILIRUBIN UA Negative  Negative   BLOOD UA Negative  Negative   BACTERIA Innumerable /hpf A None Seen, Occasional   EPITHELIAL CELLS Moderate /hpf A None Seen, Occasional   RBC UA None Seen /hpf  None Seen   WBC UA 20-30 /hpf A None Seen

## 2018-01-16 NOTE — MISCELLANEOUS
Message   Recorded as Task   Date: 06/10/2016 08:25 AM, Created By: Bridget Yeboah   Task Name: Call Back   Assigned To: [id number] [address] end clinical,Team   Regarding Patient: Wilder Aase, Status: In Progress   Comment:    Berta Bee - 10 Joe 2016 8:25 AM     TASK CREATED  T/c from patient who is known to office treated for lumbar  Patient states she presented to [facility] for c/o left shoulder stating "it felt like someone was sawing it off"  Patient states she was told by ER to follow up with her pain management physcian  Patient was requesting to be seen before Monday 6/13    made patient aware office did not have appt that soon and Dr Maxine Sandy has not seen her before for this complaint  Advised patient that she could contact her PCP and see if they had sooner opening  Patient is requesting call back from clinical team  Patient can be reached @ [phone]  Jacki De - 10 Joe 2016 9:31 AM     TASK EDITED    ******Karo Martín******  I spoke with pt, states she started with left shoulder pain early AM Thursday  Pt will be eval by PCP today but wanted to schedule OV with Dr Maxine Sandy  Scheduled for 6/13/16 at 1300  ************   Yani Mendez - 10 Joe 2016 11:01 AM     TASK REPLIED TO: Previously Assigned To Minesh Bazzi  aware agree   Jacki De - 10 Joe 2016 11:18 AM     TASK IN PROGRESS        Active Problems    1  Abdominal pain (789 00) (R10 9)   2  Acute bronchitis (466 0) (J20 9)   3  Acute maxillary sinusitis, recurrence not specified (461 0) (J01 00)   4  Allergy to insect bites and stings (V15 06) (Z91 038)   5  Anxiety disorder (300 00) (F41 9)   6  Bacterial vaginosis (616 10,041 9) (N76 0,B96 89)   7  Candidiasis (112 9) (B37 9)   8  Cellulitis (682 9) (L03 90)   9  Cervical radiculitis (723 4) (M54 12)   10  Constipation (564 00) (K59 00)   11  Dermatitis (692 9) (L30 9)   12  Diarrhea (787 91) (R19 7)   13  Difficulty voiding (788 99) (R39 19)   14  Dizziness (780 4) (R42)   15  Encounter for counseling regarding initiation of other contraceptive measure (V25 02)    (Z30 9)   16  Essential tremor (333 1) (G25 0)   17  Foot Pain (Soft Tissue) (729 5)   18  Hair loss (704 00) (L65 9)   19  Headache (784 0) (R51)   20  Hematuria (599 70) (R31 9)   21  Hip pain, left (719 45) (M25 552)   22  Hyperlipidemia (272 4) (E78 5)   23  Incomplete emptying of bladder (788 21) (R33 9)   24  Infected tooth (522 4) (K04 7)   25  Local reaction to insect sting, initial encounter   26  Lumbar disc herniation (722 10) (M51 26)   27  Lumbar pain (724 2) (M54 5)   28  Lumbar radiculopathy (724 4) (M54 16)   29  Muscle pain, myofascial (729 1) (M79 1)   30  Pigmented nevus (216 9) (D22 9)   31  Pilar cysts (704 41) (L72 11)   32  Premenstrual tension syndrome (625 4) (N94 3)   33  Seasonal allergies (477 9) (J30 2)   34  Skin nodule (782 2) (R22 9)   35  Strain of trapezius muscle, left, initial encounter (840 8) (S46 812A)   36  Suprapubic pain, acute (789 09,338 19) (R10 2)   37  Trapezius muscle spasm (728 85) (M62 838)   38  Ulcerative colitis without complications (781 3) (B05 21)   39  Upper respiratory infection (465 9) (J06 9)   40  Urgency of urination (788 63) (R39 15)   41  Urinary frequency (788 41) (R35 0)   42  Urinary tract infection (599 0) (N39 0)   43  URTI (acute upper respiratory infection) (465 9) (J06 9)   44  Uses birth control (V25 9) (Z30 9)   45  Vaginal candidiasis (112 1) (B37 3)   46  Vaginal discharge (623 5) (N89 8)   47  Vitamin B12 deficiency (266 2) (E53 8)   48  Vitamin D deficiency (268 9) (E55 9)   49  Voiding dysfunction (599 9) (N39 8)    Current Meds   1  Flexeril 10 MG TABS (Cyclobenzaprine HCl); Therapy: (Recorded:10Jun2016) to Recorded   2  Gabapentin 300 MG Oral Capsule; TAKE ONE CAPSULE BY MOUTH THREE TIMES   DAILY; Therapy: 77PXL5820 to (Jack Boy)  Requested for: 43HAJ9576; Last   Rx:08Jun2016 Ordered   3   Meloxicam 15 MG Oral Tablet; TAKE 1 TABLET DAILY WITH FOOD; Therapy: 59WUX0955 to (Evaluate:74Lrg4164)  Requested for: 33JSS5545; Last   Rx:10Jun2016 Ordered   4  Methocarbamol 750 MG Oral Tablet (Robaxin-750); TAKE 1 TO 2 TABLETS 3 TIMES   DAILY AS NEEDED; Therapy: 27HJJ3041 to (Evaluate:20Jun2016)  Requested for: 16GZQ1065; Last   Rx:10Jun2016 Ordered   5  NuvaRing RING; USE AS DIRECTED Recorded   6  PredniSONE 10 MG Oral Tablet; Take 6 pills x 3 days, 4 pills x 3 days, 2 pills x 3   days, then 1 pill x 3 days; Therapy: 76SCK1378 to (Evaluate:55Vgp3536)  Requested for: 87ERC2115; Last   Rx:10Jun2016 Ordered   7  Sertraline HCl - 100 MG Oral Tablet; TAKE 2 TABLETS DAILY; Therapy: 58OLF9490 to (Evaluate:89Zpb8027)  Requested for: 73Izl2820; Last   Rx:05Lkg1481 Ordered   8  Simvastatin 10 MG Oral Tablet; TAKE 1 TABLET AT BEDTIME; Therapy: 64MUX8250 to (Ruffus Mend)  Requested for: 86Lkm3422; Last   Rx:87Txd2527 Ordered   9  SUMAtriptan Succinate 100 MG Oral Tablet; TAKE 1 TABLET FOR MIGRAINE RELIEF    MAY REPEAT 2 HOURS LATER  MAXIMUM 200MG/DAY; Therapy: 06Wrs9437 to (Last Rx:31Ayx1907)  Requested for: 23Sby3498 Ordered   10  Vitamin D 2000 UNIT Oral Capsule; take 1 capsule daily; Therapy: 58QCS3553 to Recorded   11  Vitamin D3 83141 UNIT Oral Capsule; TAKE 1 CAPSULE Weekly; Therapy: 45ENE2402 to (Last Rx:12Nov2015)  Requested for: 66BQF8053 Ordered   12  Vitamin D3 30818 UNIT Oral Capsule; TAKE 1 CAPSULE Weekly; Therapy: 46YBD5862 to (Last Rx:16Mar2016)  Requested for: 68NYZ2255 Ordered    Allergies    1  Pristiq TB24    Signatures   Electronically signed by :  Valeria Barthel, ; Joe 10 2016 11:18AM EST                       (Author)

## 2018-01-17 NOTE — MISCELLANEOUS
Message   Recorded as Task   Date: 04/18/2017 01:57 PM, Created By: Lilli Dunne   Task Name: Follow Up   Assigned To: [id number] [address] end procedure,Team   Regarding Patient: Sami Del Real, Status: Active   Rodolfomax Mramolejocornelius - 18 Apr 2017 1:57 PM     TASK CREATED  Pt  is S/P LT SIJ INJ ON 4/12  F/U is on 5/10 w/AO  Jacki De - 19 Apr 2017 4:25 PM     TASK EDITED    1st attempt to call pt, LMOM for pt to [facility]  ****************   Osiel Harding - 24 Apr 2017 2:58 PM     TASK EDITED  2nd attempt to call, no answer  LMOM for cb  Jazmyne Vick - 24 Apr 2017 3:49 PM     TASK EDITED  Pt returned call and can be reached at [phone]  Osiel Harding - 24 Apr 2017 4:08 PM     TASK EDITED  pt reports 90-95% relief post inj    F/U is on 5/10 w/AO  Aleksandra Benitez - 01 May 2017 7:54 AM     TASK REPLIED TO: Previously Assigned To Aleksandra Benitez                      agree        Active Problems    1  Abdominal pain (789 00) (R10 9)   2  Abnormal Pap smear of vagina (795 10) (R87 629)   3  Acute bronchitis (466 0) (J20 9)   4  Acute maxillary sinusitis, recurrence not specified (461 0) (J01 00)   5  Allergy to insect bites and stings (V15 06) (Z91 038)   6  Anxiety disorder (300 00) (F41 9)   7  Bacterial vaginosis (616 10,041 9) (N76 0,B96 89)   8  Breast pain (611 71) (N64 4)   9  Candidiasis (112 9) (B37 9)   10  Cat bite (879 8,E906 3) (W55 01XA)   11  Cellulitis (682 9) (L03 90)   12  Cervical radiculitis (723 4) (M54 12)   13  Constipation (564 00) (K59 00)   14  Contact dermatitis (692 9) (L25 9)   15  Counseling for sexually transmitted disease (V65 45) (Z70 8)   16  Dehydration (276 51) (E86 0)   17  Dermatitis (692 9) (L30 9)   18  Diarrhea (787 91) (R19 7)   19  Difficulty voiding (788 99) (R39 198)   20  Dizziness (780 4) (R42)   21  Encounter for counseling regarding initiation of other contraceptive measure (V25 02)    (Z30 09)   22   Encounter for gynecological examination without abnormal finding (V72 31) (Z01 419)   23  Essential tremor (333 1) (G25 0)   24  Foot Pain (Soft Tissue) (729 5)   25  Hair loss (704 00) (L65 9)   26  Headache (784 0) (R51)   27  Hematuria (599 70) (R31 9)   28  Hip pain, left (719 45) (M25 552)   29  House dust mite allergy (V15 09) (Z91 09)   30  Hyperlipidemia (272 4) (E78 5)   31  Incomplete emptying of bladder (788 21) (R33 9)   32  Infected tooth (522 4) (K04 7)   33  Irregular menses (626 4) (N92 6)   34  Local reaction to insect sting, initial encounter   35  Lumbar disc herniation (722 10) (M51 26)   36  Lumbar pain (724 2) (M54 5)   37  Lumbar radiculopathy (724 4) (M54 16)   38  Migraine with aura and without status migrainosus, not intractable (346 00) (G43 109)   39  Muscle pain, myofascial (729 1) (M79 1)   40  Nausea (787 02) (R11 0)   41  Need for HPV vaccination (V04 89) (Z23)   42  Obesity (278 00) (E66 9)   43  Pigmented nevus (216 9) (D22 9)   44  Pilar cysts (704 41) (L72 11)   45  Premenstrual tension syndrome (625 4) (N94 3)   46  Sacroiliitis (720 2) (M46 1)   47  Seasonal allergies (477 9) (J30 2)   48  Shoulder strain (840 9) (S46 919A)   49  Skin nodule (782 2) (R22 9)   50  Suprapubic pain, acute (789 09,338 19) (R10 2)   51  Trapezius muscle spasm (728 85) (M62 838)   52  Ulcerative colitis without complications (853 4) (L95 33)   53  Upper respiratory infection (465 9) (J06 9)   54  Urgency of urination (788 63) (R39 15)   55  Urinary frequency (788 41) (R35 0)   56  Urinary tract infection (599 0) (N39 0)   57  URTI (acute upper respiratory infection) (465 9) (J06 9)   58  Uses birth control (V25 9) (Z30 9)   59  Vaginal candidiasis (112 1) (B37 3)   60  Vaginal discharge (623 5) (N89 8)   61  Vitamin B12 deficiency (266 2) (E53 8)   62  Vitamin D deficiency (268 9) (E55 9)   63  Voiding dysfunction (599 9) (N39 8)   64  Weight gain (783 1) (R63 5)    Current Meds   1  Butalbital-APAP-Caffeine -40 MG Oral Tablet;    Therapy: 20CDI8360 to (Evaluate:63Naf5118) Recorded   2  Gabapentin 300 MG Oral Capsule; TAKE 1 CAPSULE 3 TIMES DAILY; Therapy: 17UEA7213 to (Evaluate:04Jun2017)  Requested for: 05Apr2017; Last   Rx:05Apr2017 Ordered   3  LORazepam 0 5 MG Oral Tablet; TAKE 1/2 TO 1 TABLET TWICE DAILY AS NEEDED; Therapy: 38LGF9626 to (Evaluate:11Oct2016); Last Rx:50Rdc5205 Ordered   4  Montelukast Sodium 10 MG Oral Tablet; TAKE 1 TABLET DAILY  Requested for:   19Apr2017; Last Rx:19Apr2017 Ordered   5  Naproxen 500 MG Oral Tablet (Naprosyn); TAKE 1 TABLET TWICE DAILY; Therapy: 09TUQ2058 to (Evaluate:23Apr2017)  Requested for: 69Vdx6473; Last   Rx:18Hdn7362 Ordered   6  NuvaRing 0 12-0 015 MG/24HR Vaginal Ring; USE AS DIRECTED; Therapy: 77XDN6031 to (Evaluate:68Ueg2592)  Requested for: 92NHD7012; Last   Rx:05Jan2017 Ordered   7  Ondansetron HCl - 4 MG Oral Tablet (Zofran); TAKE 1 TABLET EVERY 4 TO 6 HOURS   AS NEEDED FOR NAUSEA; Therapy: 63KXJ8446 to (Evaluate:12Jan2017)  Requested for: 02UPT5785; Last   Rx:10Jan2017 Ordered   8  Propranolol HCl ER 60 MG Oral Capsule Extended Release 24 Hour; One daily; Therapy: 41FBI2843 to (Last Rx:19Apr2017)  Requested for: 19Apr2017 Ordered   9  Sertraline HCl - 100 MG Oral Tablet; TAKE 2 TABLETS DAILY; Therapy: 71CFW0633 to (Evaluate:13Xiw3185)  Requested for: 19Apr2017; Last   Rx:19Apr2017 Ordered   10  Simvastatin 10 MG Oral Tablet; TAKE 1 TABLET AT BEDTIME; Therapy: 07ASH9773 to (Desell Harps)  Requested for: 25Apr2016; Last    Rx:25Apr2016 Ordered   11  SUMAtriptan Succinate 100 MG Oral Tablet; TAKE 1 TABLET FOR MIGRAINE RELIEF     MAY REPEAT 2 HOURS LATER  MAXIMUM 200MG/DAY; Therapy: 38Pxk6856 to (Last Rx:10Oct2016)  Requested for: 84ZCZ5452 Ordered   12  Topiramate 50 MG Oral Tablet; TAKE 2 TABLETS AT BEDTIME; Therapy: 04JZD1743 to (Trinna Scheuermann)  Requested for: 22TNN6277; Last    Rx:82Zuy3284 Ordered   13  Vitamin D 2000 UNIT Oral Capsule; take 1 capsule daily;     Therapy: 98BOS4313 to Recorded   14  Vitamin D3 25121 UNIT Oral Capsule; TAKE 1 CAPSULE Weekly; Therapy: 96EDQ5883 to (Last Rx:71Ptl3383)  Requested for: 91Sjq0315 Ordered    Allergies    1   Pristiq TB24    Signatures   Electronically signed by : Melvina Lewis [address] [place] [address] Melanie; May  1 2017 10:09AM EST                       (Author)

## 2018-01-17 NOTE — RESULT NOTES
Verified Results  * MRI BRAIN W WO CONTRAST [id number] 08:54PM Radha Perfect Order Number: [id number]    - Patient Instructions: To schedule this appointment, please contact Central Scheduling at [phone]  Test Name Result Flag Reference   MRI BRAIN W WO CONTRAST (Report)     This is a summary report  The complete report is available in the patient's medical record  If you cannot access the medical record, please contact the sending organization for a detailed fax or copy  MRI BRAIN WITH AND WITHOUT CONTRAST     INDICATION: Migraines with confusion and blurred vision  COMPARISON: Prior MRI from March 20, 2014     TECHNIQUE:   Sagittal T1, axial T2, axial FLAIR, axial T1, axial [place], axial diffusion  Sagittal and axial T1 postcontrast      IV Contrast: 8 mL of Gadobutrol injection (SINGLE-DOSE)       IMAGE QUALITY:  Diagnostic  FINDINGS:     BRAIN PARENCHYMA: There is no discrete mass, mass effect or midline shift  No abnormal white matter signal identified  Brainstem and cerebellum demonstrate normal signal  There is no intracranial hemorrhage  There is no evidence of acute infarction and   diffusion imaging is unremarkable  Postcontrast imaging of the brain demonstrates no abnormal enhancement  VENTRICLES: Normal      SELLA AND PITUITARY GLAND: Normal      ORBITS: Normal      PARANASAL SINUSES: Normal      VASCULATURE: Evaluation of the major intracranial vasculature demonstrates appropriate flow voids  CALVARIUM AND SKULL BASE: Normal      EXTRACRANIAL SOFT TISSUES: Numerous small subcutaneous circumscribed masses are seen many of which have slightly increased in size from the prior study however likely represent sebaceous cysts  IMPRESSION:     Normal MRI of the brain         Workstation performed: [id number]     Signed by:   Antonio Black DO   5/15/17       Signatures   Electronically signed by : CHIQUITA Cordova ; May 16 2017 12:54PM EST (Author)

## 2018-01-22 VITALS
SYSTOLIC BLOOD PRESSURE: 104 MMHG | RESPIRATION RATE: 12 BRPM | WEIGHT: 174 LBS | BODY MASS INDEX: 30.83 KG/M2 | HEIGHT: 63 IN | DIASTOLIC BLOOD PRESSURE: 66 MMHG | HEART RATE: 68 BPM

## 2018-01-22 VITALS
OXYGEN SATURATION: 98 % | SYSTOLIC BLOOD PRESSURE: 108 MMHG | DIASTOLIC BLOOD PRESSURE: 62 MMHG | RESPIRATION RATE: 14 BRPM | WEIGHT: 173.25 LBS | HEART RATE: 98 BPM | BODY MASS INDEX: 29.58 KG/M2 | HEIGHT: 64 IN

## 2018-01-22 VITALS
HEIGHT: 64 IN | SYSTOLIC BLOOD PRESSURE: 110 MMHG | BODY MASS INDEX: 29.53 KG/M2 | DIASTOLIC BLOOD PRESSURE: 70 MMHG | WEIGHT: 173 LBS

## 2018-01-22 VITALS
TEMPERATURE: 98.4 F | WEIGHT: 174.13 LBS | SYSTOLIC BLOOD PRESSURE: 92 MMHG | HEIGHT: 63 IN | BODY MASS INDEX: 30.85 KG/M2 | DIASTOLIC BLOOD PRESSURE: 68 MMHG

## 2018-01-22 VITALS — HEIGHT: 64 IN | SYSTOLIC BLOOD PRESSURE: 108 MMHG | DIASTOLIC BLOOD PRESSURE: 64 MMHG | TEMPERATURE: 98.3 F

## 2018-01-24 VITALS
SYSTOLIC BLOOD PRESSURE: 120 MMHG | HEIGHT: 63 IN | DIASTOLIC BLOOD PRESSURE: 80 MMHG | TEMPERATURE: 99.7 F | BODY MASS INDEX: 29.99 KG/M2 | WEIGHT: 169.25 LBS

## 2018-01-29 ENCOUNTER — TELEPHONE (OUTPATIENT)
Dept: PAIN MEDICINE | Facility: MEDICAL CENTER | Age: 28
End: 2018-01-29

## 2018-01-29 NOTE — TELEPHONE ENCOUNTER
Pt called to cancel her 9:45 appt with you this morning  She said that she has a migraine and cannot drive herself to the appt  She is rescheduled for 2/12

## 2018-02-05 ENCOUNTER — HOSPITAL ENCOUNTER (EMERGENCY)
Facility: HOSPITAL | Age: 28
Discharge: HOME | End: 2018-02-05
Attending: EMERGENCY MEDICINE | Admitting: EMERGENCY MEDICINE
Payer: COMMERCIAL

## 2018-02-05 ENCOUNTER — TELEPHONE (OUTPATIENT)
Dept: FAMILY MEDICINE CLINIC | Facility: CLINIC | Age: 28
End: 2018-02-05

## 2018-02-05 ENCOUNTER — APPOINTMENT (EMERGENCY)
Dept: RADIOLOGY | Facility: HOSPITAL | Age: 28
End: 2018-02-05
Payer: COMMERCIAL

## 2018-02-05 VITALS
TEMPERATURE: 98.1 F | RESPIRATION RATE: 16 BRPM | HEART RATE: 86 BPM | DIASTOLIC BLOOD PRESSURE: 72 MMHG | SYSTOLIC BLOOD PRESSURE: 111 MMHG | BODY MASS INDEX: 29.55 KG/M2 | WEIGHT: 166.8 LBS | OXYGEN SATURATION: 98 %

## 2018-02-05 DIAGNOSIS — R07.89 CHEST WALL PAIN: Primary | ICD-10-CM

## 2018-02-05 LAB
ALBUMIN SERPL BCP-MCNC: 3.4 G/DL (ref 3.5–5)
ALP SERPL-CCNC: 50 U/L (ref 46–116)
ALT SERPL W P-5'-P-CCNC: 20 U/L (ref 12–78)
ANION GAP SERPL CALCULATED.3IONS-SCNC: 11 MMOL/L (ref 4–13)
AST SERPL W P-5'-P-CCNC: 19 U/L (ref 5–45)
ATRIAL RATE: 73 BPM
BACTERIA UR QL AUTO: ABNORMAL /HPF
BASOPHILS # BLD AUTO: 0.02 THOUSANDS/ΜL (ref 0–0.1)
BASOPHILS NFR BLD AUTO: 0 % (ref 0–1)
BILIRUB DIRECT SERPL-MCNC: 0.09 MG/DL (ref 0–0.2)
BILIRUB SERPL-MCNC: 0.33 MG/DL (ref 0.2–1)
BILIRUB UR QL STRIP: ABNORMAL
BUN SERPL-MCNC: 9 MG/DL (ref 5–25)
CALCIUM SERPL-MCNC: 9.1 MG/DL (ref 8.3–10.1)
CHLORIDE SERPL-SCNC: 103 MMOL/L (ref 100–108)
CLARITY UR: ABNORMAL
CO2 SERPL-SCNC: 23 MMOL/L (ref 21–32)
COLOR UR: YELLOW
CREAT SERPL-MCNC: 0.72 MG/DL (ref 0.6–1.3)
EOSINOPHIL # BLD AUTO: 0.17 THOUSAND/ΜL (ref 0–0.61)
EOSINOPHIL NFR BLD AUTO: 2 % (ref 0–6)
ERYTHROCYTE [DISTWIDTH] IN BLOOD BY AUTOMATED COUNT: 12.8 % (ref 11.6–15.1)
GFR SERPL CREATININE-BSD FRML MDRD: 116 ML/MIN/1.73SQ M
GLUCOSE SERPL-MCNC: 94 MG/DL (ref 65–140)
GLUCOSE UR STRIP-MCNC: NEGATIVE MG/DL
HCT VFR BLD AUTO: 37.7 % (ref 34.8–46.1)
HGB BLD-MCNC: 12.9 G/DL (ref 11.5–15.4)
HGB UR QL STRIP.AUTO: NEGATIVE
KETONES UR STRIP-MCNC: NEGATIVE MG/DL
LEUKOCYTE ESTERASE UR QL STRIP: ABNORMAL
LYMPHOCYTES # BLD AUTO: 3.45 THOUSANDS/ΜL (ref 0.6–4.47)
LYMPHOCYTES NFR BLD AUTO: 49 % (ref 14–44)
MCH RBC QN AUTO: 29.9 PG (ref 26.8–34.3)
MCHC RBC AUTO-ENTMCNC: 34.2 G/DL (ref 31.4–37.4)
MCV RBC AUTO: 87 FL (ref 82–98)
MONOCYTES # BLD AUTO: 0.48 THOUSAND/ΜL (ref 0.17–1.22)
MONOCYTES NFR BLD AUTO: 7 % (ref 4–12)
NEUTROPHILS # BLD AUTO: 2.95 THOUSANDS/ΜL (ref 1.85–7.62)
NEUTS SEG NFR BLD AUTO: 42 % (ref 43–75)
NITRITE UR QL STRIP: NEGATIVE
NON-SQ EPI CELLS URNS QL MICRO: ABNORMAL /HPF
NRBC BLD AUTO-RTO: 0 /100 WBCS
P AXIS: 27 DEGREES
PH UR STRIP.AUTO: 7 [PH] (ref 4.5–8)
PLATELET # BLD AUTO: 317 THOUSANDS/UL (ref 149–390)
PMV BLD AUTO: 9.5 FL (ref 8.9–12.7)
POTASSIUM SERPL-SCNC: 4.3 MMOL/L (ref 3.5–5.3)
PR INTERVAL: 142 MS
PROT SERPL-MCNC: 7.3 G/DL (ref 6.4–8.2)
PROT UR STRIP-MCNC: ABNORMAL MG/DL
QRS AXIS: 37 DEGREES
QRSD INTERVAL: 92 MS
QT INTERVAL: 384 MS
QTC INTERVAL: 423 MS
RBC # BLD AUTO: 4.32 MILLION/UL (ref 3.81–5.12)
RBC #/AREA URNS AUTO: ABNORMAL /HPF
SODIUM SERPL-SCNC: 137 MMOL/L (ref 136–145)
SP GR UR STRIP.AUTO: 1.02 (ref 1–1.03)
T WAVE AXIS: 21 DEGREES
UROBILINOGEN UR QL STRIP.AUTO: 1 E.U./DL
VENTRICULAR RATE: 73 BPM
WBC # BLD AUTO: 7.07 THOUSAND/UL (ref 4.31–10.16)
WBC #/AREA URNS AUTO: ABNORMAL /HPF

## 2018-02-05 PROCEDURE — 87086 URINE CULTURE/COLONY COUNT: CPT

## 2018-02-05 PROCEDURE — 85025 COMPLETE CBC W/AUTO DIFF WBC: CPT | Performed by: EMERGENCY MEDICINE

## 2018-02-05 PROCEDURE — 96361 HYDRATE IV INFUSION ADD-ON: CPT

## 2018-02-05 PROCEDURE — 93005 ELECTROCARDIOGRAM TRACING: CPT

## 2018-02-05 PROCEDURE — 80076 HEPATIC FUNCTION PANEL: CPT | Performed by: EMERGENCY MEDICINE

## 2018-02-05 PROCEDURE — 81001 URINALYSIS AUTO W/SCOPE: CPT

## 2018-02-05 PROCEDURE — 36415 COLL VENOUS BLD VENIPUNCTURE: CPT | Performed by: EMERGENCY MEDICINE

## 2018-02-05 PROCEDURE — 80048 BASIC METABOLIC PNL TOTAL CA: CPT | Performed by: EMERGENCY MEDICINE

## 2018-02-05 PROCEDURE — 81025 URINE PREGNANCY TEST: CPT | Performed by: EMERGENCY MEDICINE

## 2018-02-05 PROCEDURE — 71046 X-RAY EXAM CHEST 2 VIEWS: CPT

## 2018-02-05 PROCEDURE — 99285 EMERGENCY DEPT VISIT HI MDM: CPT

## 2018-02-05 PROCEDURE — 96374 THER/PROPH/DIAG INJ IV PUSH: CPT

## 2018-02-05 PROCEDURE — 93010 ELECTROCARDIOGRAM REPORT: CPT | Performed by: INTERNAL MEDICINE

## 2018-02-05 PROCEDURE — 81002 URINALYSIS NONAUTO W/O SCOPE: CPT | Performed by: EMERGENCY MEDICINE

## 2018-02-05 RX ORDER — MONTELUKAST SODIUM 10 MG/1
1 TABLET ORAL DAILY
COMMUNITY
End: 2018-05-24

## 2018-02-05 RX ORDER — LORAZEPAM 0.5 MG/1
1 TABLET ORAL 2 TIMES DAILY PRN
COMMUNITY
Start: 2016-09-26 | End: 2018-05-24

## 2018-02-05 RX ORDER — METHOCARBAMOL 500 MG/1
500 TABLET, FILM COATED ORAL 2 TIMES DAILY
Qty: 20 TABLET | Refills: 0 | Status: SHIPPED | OUTPATIENT
Start: 2018-02-05 | End: 2018-05-24

## 2018-02-05 RX ORDER — TOPIRAMATE 50 MG/1
3 TABLET, FILM COATED ORAL
COMMUNITY
Start: 2016-10-26 | End: 2018-06-15 | Stop reason: SDUPTHER

## 2018-02-05 RX ORDER — KETOROLAC TROMETHAMINE 30 MG/ML
15 INJECTION, SOLUTION INTRAMUSCULAR; INTRAVENOUS ONCE
Status: COMPLETED | OUTPATIENT
Start: 2018-02-05 | End: 2018-02-05

## 2018-02-05 RX ORDER — GABAPENTIN 300 MG/1
CAPSULE ORAL
COMMUNITY
Start: 2015-08-10 | End: 2018-06-15 | Stop reason: SDUPTHER

## 2018-02-05 RX ORDER — CLONAZEPAM 1 MG/1
1 TABLET ORAL
COMMUNITY
Start: 2018-01-22 | End: 2018-05-17 | Stop reason: SDUPTHER

## 2018-02-05 RX ORDER — SUMATRIPTAN 100 MG/1
TABLET, FILM COATED ORAL
COMMUNITY
Start: 2016-10-19 | End: 2018-04-05 | Stop reason: SDUPTHER

## 2018-02-05 RX ORDER — PROPRANOLOL HCL 60 MG
CAPSULE, EXTENDED RELEASE 24HR ORAL
COMMUNITY
Start: 2017-01-17 | End: 2018-05-17 | Stop reason: SDUPTHER

## 2018-02-05 RX ORDER — NAPROXEN 500 MG/1
1 TABLET ORAL AS NEEDED
COMMUNITY
Start: 2017-02-22 | End: 2019-03-05 | Stop reason: SDUPTHER

## 2018-02-05 RX ORDER — ETONOGESTREL AND ETHINYL ESTRADIOL 11.7; 2.7 MG/1; MG/1
INSERT, EXTENDED RELEASE VAGINAL
COMMUNITY
Start: 2016-10-14 | End: 2018-06-06 | Stop reason: SDUPTHER

## 2018-02-05 RX ORDER — SERTRALINE HYDROCHLORIDE 100 MG/1
100 TABLET, FILM COATED ORAL
COMMUNITY
Start: 2013-10-26 | End: 2018-03-28 | Stop reason: SDUPTHER

## 2018-02-05 RX ORDER — MELATONIN
COMMUNITY
Start: 2016-09-07 | End: 2018-03-22

## 2018-02-05 RX ADMIN — KETOROLAC TROMETHAMINE 15 MG: 30 INJECTION, SOLUTION INTRAMUSCULAR at 17:49

## 2018-02-05 RX ADMIN — SODIUM CHLORIDE 1000 ML: 0.9 INJECTION, SOLUTION INTRAVENOUS at 17:33

## 2018-02-05 NOTE — ED PROVIDER NOTES
History  Chief Complaint   Patient presents with    Chest Pain     patient complainnig of back and chest pain when she breathes that started 3 days ago; per mother of patient, patient has been fatigued for the past couple of days;      32 YO female presents with Left lateral chest pain for the last 3 days  States this has been constant, aching, non-radiating, worse with movement as well as deep breathing  She states this has been associated with increased fatigue, denies nausea, palpitations, shortness of breath  Pt denies similar symptoms in the past, no recent history of heavy lifting or trauma  Pt is currently being evaluated for rheumatologic conditions, stating she has elevation in her ESR on testing as well as other inflammatory markers  Denies recent URI symptoms  Pt denies SOB/F/C/N/V/D/C, no dysuria, burning on urination or blood in urine  History provided by:  Patient and parent   used: No    Chest Pain   Pain location:  L lateral chest  Pain quality: aching and sharp    Pain radiates to:  Does not radiate  Pain severity:  Moderate  Onset quality:  Gradual  Duration:  3 days  Timing:  Constant  Progression:  Waxing and waning  Chronicity:  New  Context: breathing and movement    Relieved by:  Nothing  Worsened by:  Deep breathing and movement  Ineffective treatments:  None tried  Associated symptoms: fatigue    Associated symptoms: no abdominal pain, no back pain, no cough, no dizziness, no fever, no nausea, no palpitations, no shortness of breath, not vomiting and no weakness    Fatigue:     Severity:  Moderate    Duration:  3 days    Timing:  Intermittent    Progression:  Waxing and waning      Prior to Admission Medications   Prescriptions Last Dose Informant Patient Reported? Taking?    LORazepam (ATIVAN) 0 5 mg tablet   Yes Yes   Sig: Take 1 tablet by mouth 2 (two) times a day as needed   SUMAtriptan (IMITREX) 100 mg tablet   Yes Yes   cholecalciferol (VITAMIN D3) 1,000 units tablet   Yes Yes   clonazePAM (KlonoPIN) 1 mg tablet   Yes Yes   Sig: Take 1 tablet by mouth   etonogestrel-ethinyl estradiol (NUVARING) 0 12-0 015 MG/24HR vaginal ring   Yes Yes   gabapentin (NEURONTIN) 300 mg capsule   Yes Yes   montelukast (SINGULAIR) 10 mg tablet   Yes Yes   Sig: Take 1 tablet by mouth daily   naproxen (NAPROSYN) 500 mg tablet   Yes Yes   Sig: Take 1 tablet by mouth 2 (two) times a day   onabotulinumtoxin A (BOTOX) 100 units   Yes Yes   Sig: Inject as directed   propranolol (INDERAL LA) 60 mg 24 hr capsule   Yes Yes   Sig: Take by mouth   sertraline (ZOLOFT) 100 mg tablet   Yes Yes   Sig: Take 100 mg by mouth   topiramate (TOPAMAX) 50 MG tablet   Yes Yes   Sig: Take 3 tablets by mouth      Facility-Administered Medications: None       History reviewed  No pertinent past medical history  History reviewed  No pertinent surgical history  History reviewed  No pertinent family history  I have reviewed and agree with the history as documented  Social History   Substance Use Topics    Smoking status: Never Smoker    Smokeless tobacco: Never Used    Alcohol use No        Review of Systems   Constitutional: Positive for fatigue  Negative for chills and fever  HENT: Negative for dental problem  Eyes: Negative for visual disturbance  Respiratory: Negative for cough and shortness of breath  Cardiovascular: Positive for chest pain  Negative for palpitations  Gastrointestinal: Negative for abdominal pain, diarrhea, nausea and vomiting  Genitourinary: Negative for dysuria and frequency  Musculoskeletal: Negative for arthralgias and back pain  Skin: Negative for rash  Neurological: Negative for dizziness, weakness and light-headedness  Psychiatric/Behavioral: Negative for agitation, behavioral problems and confusion  All other systems reviewed and are negative        Physical Exam  ED Triage Vitals [02/05/18 1621]   Temperature Pulse Respirations Blood Pressure SpO2 98 1 °F (36 7 °C) 88 18 113/74 97 %      Temp Source Heart Rate Source Patient Position - Orthostatic VS BP Location FiO2 (%)   Temporal Monitor Sitting Right arm --      Pain Score       --           Orthostatic Vital Signs  Vitals:    02/05/18 1621 02/05/18 1756   BP: 113/74 111/72   Pulse: 88 86   Patient Position - Orthostatic VS: Sitting Lying       Physical Exam   Constitutional: She is oriented to person, place, and time  She appears well-developed and well-nourished  HENT:   Head: Normocephalic and atraumatic  Eyes: EOM are normal    Neck: Normal range of motion  Cardiovascular: Normal rate, regular rhythm and normal heart sounds  Pulmonary/Chest: Effort normal and breath sounds normal    Palpation over lateral Left chest wall reproduces Pt's discomfort  Abdominal: Soft  Musculoskeletal: Normal range of motion  Neurological: She is alert and oriented to person, place, and time  Skin: Skin is warm and dry  Psychiatric: She has a normal mood and affect  Her behavior is normal  Thought content normal    Nursing note and vitals reviewed  ED Medications  Medications   sodium chloride 0 9 % bolus 1,000 mL (0 mL Intravenous Stopped 2/5/18 1906)   ketorolac (TORADOL) injection 15 mg (15 mg Intravenous Given 2/5/18 1749)       Diagnostic Studies  Results Reviewed     Procedure Component Value Units Date/Time    Urine Microscopic [[id number]]  (Abnormal) Collected:  02/05/18 1745    Lab Status:  Final result Specimen:  Urine from Urine, Clean Catch Updated:  02/05/18 1834     RBC, UA None Seen /hpf      WBC, UA 10-20 (A) /hpf      Epithelial Cells Occasional /hpf      Bacteria, UA Occasional /hpf     Urine culture [[id number]] Collected:  02/05/18 1745    Lab Status:   In process Specimen:  Urine from Urine, Clean Catch Updated:  02/05/18 2598    Basic metabolic panel [[id number]] Collected:  02/05/18 1731    Lab Status:  Final result Specimen:  Blood from Arm, Left Updated:  02/05/18 1754 Sodium 137 mmol/L      Potassium 4 3 mmol/L      Chloride 103 mmol/L      CO2 23 mmol/L      Anion Gap 11 mmol/L      BUN 9 mg/dL      Creatinine 0 72 mg/dL      Glucose 94 mg/dL      Calcium 9 1 mg/dL      eGFR 116 ml/min/1 73sq m     Narrative:         National Kidney Disease Education Program recommendations are as follows:  GFR calculation is accurate only with a steady state creatinine  Chronic Kidney disease less than 60 ml/min/1 73 sq  meters  Kidney failure less than 15 ml/min/1 73 sq  meters      Hepatic function panel [[id number]]  (Abnormal) Collected:  02/05/18 1731    Lab Status:  Final result Specimen:  Blood from Arm, Left Updated:  02/05/18 1754     Total Bilirubin 0 33 mg/dL      Bilirubin, Direct 0 09 mg/dL      Alkaline Phosphatase 50 U/L      AST 19 U/L      ALT 20 U/L      Total Protein 7 3 g/dL      Albumin 3 4 (L) g/dL     POCT urinalysis dipstick [[id number]]  (Abnormal) Resulted:  02/05/18 1748    Lab Status:  Final result Specimen:  Urine Updated:  02/05/18 1748    POCT pregnancy, urine [[id number]]  (Normal) Resulted:  02/05/18 1747    Lab Status:  Final result Updated:  02/05/18 1748     EXT PREG TEST UR (Ref: Negative) --    ED Urine Macroscopic [[id number]]  (Abnormal) Collected:  02/05/18 1745    Lab Status:  Final result Specimen:  Urine Updated:  02/05/18 1745     Color, UA Yellow     Clarity, UA Cloudy     pH, UA 7 0     Leukocytes, UA Large (A)     Nitrite, UA Negative     Protein, UA 30 (1+) (A) mg/dl      Glucose, UA Negative mg/dl      Ketones, UA Negative mg/dl      Urobilinogen, UA 1 0 E U /dl      Bilirubin, UA Interference- unable to analyze (A)     Blood, UA Negative     Specific Gravity, UA 1 025    Narrative:       CLINITEK RESULT    CBC and differential [[id number]]  (Abnormal) Collected:  02/05/18 1731    Lab Status:  Final result Specimen:  Blood from Arm, Left Updated:  02/05/18 1740     WBC 7 07 Thousand/uL      RBC 4 32 Million/uL      Hemoglobin 12 9 g/dL      Hematocrit 37 7 %      MCV 87 fL      MCH 29 9 pg      MCHC 34 2 g/dL      RDW 12 8 %      MPV 9 5 fL      Platelets 310 Thousands/uL      nRBC 0 /100 WBCs      Neutrophils Relative 42 (L) %      Lymphocytes Relative 49 (H) %      Monocytes Relative 7 %      Eosinophils Relative 2 %      Basophils Relative 0 %      Neutrophils Absolute 2 95 Thousands/µL      Lymphocytes Absolute 3 45 Thousands/µL      Monocytes Absolute 0 48 Thousand/µL      Eosinophils Absolute 0 17 Thousand/µL      Basophils Absolute 0 02 Thousands/µL                  XR chest 2 views   ED Interpretation by Nancy Myers MD (02/05 1818)   No PNA      Final Result by Yenifer Benites MD (02/05 2025)      No active pulmonary disease  Workstation performed: [id number]                    Procedures  ECG 12 Lead Documentation  Date/Time: 2/6/2018 9:53 AM  Performed by: Laureen Edwards  Authorized by: Laureen Edwards     ECG reviewed by me, the ED Provider: yes    Patient location:  ED  Interpretation:     Interpretation: normal    Rate:     ECG rate assessment: normal    Rhythm:     Rhythm: sinus rhythm    QRS:     QRS axis:  Normal    QRS intervals:  Normal  Conduction:     Conduction: normal    ST segments:     ST segments:  Normal  T waves:     T waves: normal             Phone Contacts  ED Phone Contact    ED Course  ED Course                                MDM  Number of Diagnoses or Management Options  Chest wall pain: new and requires workup  Diagnosis management comments: 1  Chest pain - Pt has a reproducible chest pain that has been persistent for days, no known congenital heart defects and no risk factors for CAD  Will check ECG, electrolytes for abnormalities, CBC for anemia  CXR for structural abnormalities  Treat with NSAIDs         Amount and/or Complexity of Data Reviewed  Clinical lab tests: ordered and reviewed  Tests in the radiology section of CPT®: ordered and reviewed  Obtain history from someone other than the patient: yes  Independent visualization of images, tracings, or specimens: yes    Patient Progress  Patient progress: improved    CritCare Time    Disposition  Final diagnoses:   Chest wall pain     Time reflects when diagnosis was documented in both MDM as applicable and the Disposition within this note     Time User Action Codes Description Comment    2/5/2018  6:33 PM Sera Patelmicah Maxwell [R07 [id number]] Chest wall pain       ED Disposition     ED Disposition Condition Comment    Discharge  Cruz Schneider discharge to home/self care      Condition at discharge: Stable        Follow-up Information    None       Discharge Medication List as of 2/5/2018  6:35 PM      START taking these medications    Details   methocarbamol (ROBAXIN) 500 mg tablet Take 1 tablet (500 mg total) by mouth 2 (two) times a day, Starting Mon 2/5/2018, Print         CONTINUE these medications which have NOT CHANGED    Details   cholecalciferol (VITAMIN D3) 1,000 units tablet Starting Wed 9/7/2016, Historical Med      clonazePAM (KlonoPIN) 1 mg tablet Take 1 tablet by mouth, Starting Mon 1/22/2018, Historical Med      etonogestrel-ethinyl estradiol (NUVARING) 0 12-0 015 MG/24HR vaginal ring Historical Med      gabapentin (NEURONTIN) 300 mg capsule Starting Mon 8/10/2015, Historical Med      LORazepam (ATIVAN) 0 5 mg tablet Take 1 tablet by mouth 2 (two) times a day as needed, Starting Mon 9/26/2016, Historical Med      montelukast (SINGULAIR) 10 mg tablet Take 1 tablet by mouth daily, Historical Med      naproxen (NAPROSYN) 500 mg tablet Take 1 tablet by mouth 2 (two) times a day, Starting Wed 2/22/2017, Historical Med      onabotulinumtoxin A (BOTOX) 100 units Inject as directed, Starting Tue 8/29/2017, Historical Med      propranolol (INDERAL LA) 60 mg 24 hr capsule Take by mouth, Starting Tue 1/17/2017, Historical Med      sertraline (ZOLOFT) 100 mg tablet Take 100 mg by mouth, Starting Sat 10/26/2013, Historical Med      SUMAtriptan (IMITREX) 100 mg tablet Starting Wed 10/19/2016, Historical Med      topiramate (TOPAMAX) 50 MG tablet Take 3 tablets by mouth, Starting Wed 10/26/2016, Historical Med           No discharge procedures on file      ED Provider  Electronically Signed by           Mary Chopra MD  02/06/18 8872

## 2018-02-05 NOTE — ED NOTES
Per mother of patient; patient has been acting "slow and patient sees a rheumatologist because there is a possibly that she has a autoimmune disorder"     Elvia Mosher, ODALIS  02/05/18 4626

## 2018-02-05 NOTE — TELEPHONE ENCOUNTER
PT'S MOM CALLED  STATES VINICIO IS STILL IN A LOT OF PAIN  SHE DID CALL THE RHEUMATOLOGIST OFFICE BUT NO ONE CALLED THEM BACK YET   SHE WOULD LIKE TO TALK TO YOU ABOUT HER DAUGHTER

## 2018-02-05 NOTE — DISCHARGE INSTRUCTIONS
Continue to take your meloxicam as directed  You can try the Robaxin as well, be careful as this may make you drowsy  Continue to follow up with your doctors for further evaluation and management  Chest Pain, Ambulatory Care   GENERAL INFORMATION:   Chest pain  can be caused by a range of conditions, from not serious to life-threatening  It may be caused by a heart attack or a blood clot in your lungs  Sometimes chest pain or pressure is caused by poor blood flow to your heart (angina)  Infection, inflammation, or a fracture in the bones or cartilage in your chest can cause pain or discomfort  Chest pain can also be a symptom of a digestive problem, such as acid reflux or a stomach ulcer  Common symptoms include the following:   · Fever or sweating     · Nausea or vomiting     · Shortness of breath     · Discomfort or pressure that spreads from your chest to your back, jaw, or arm     · A racing or slow heartbeat     · Feeling weak, tired, or faint  Seek immediate care for the following symptoms:   · Any of the following signs of a heart attack:      ¨ Squeezing, pressure, or pain in your chest that lasts longer than 5 minutes or returns    ¨ Discomfort or pain in your back, neck, jaw, stomach, or arm     ¨ Trouble breathing     ¨ Nausea or vomiting    ¨ Lightheadedness or a sudden cold sweat, especially with trouble breathing         · Chest discomfort that gets worse, even with medicine    · Coughing or vomiting blood    · Black or bloody bowel movements     · Vomiting that does not stop, or pain when you swallow  Treatment for chest pain  may include medicine to treat your symptoms while he determines the cause of your chest pain  You may also need any of the following:  · Antiplatelets , such as aspirin, help prevent blood clots  Take your antiplatelet medicine exactly as directed  These medicines make it more likely for you to bleed or bruise   If you are told to take aspirin, do not take acetaminophen or ibuprofen instead  · Prescription pain medicine  may be given  Ask how to take this medicine safely  Do not smoke: If you smoke, it is never too late to quit  Smoking increases your risk for a heart attack and other heart and lung conditions  Ask your healthcare provider for information about how to stop smoking if you need help  Follow up with your healthcare provider as directed: You may need more tests  You may be referred to a specialist, such as a cardiologist or gastroenterologist  Write down your questions so you remember to ask them during your visits  CARE AGREEMENT:   You have the right to help plan your care  Learn about your health condition and how it may be treated  Discuss treatment options with your caregivers to decide what care you want to receive  You always have the right to refuse treatment  The above information is an  only  It is not intended as medical advice for individual conditions or treatments  Talk to your doctor, nurse or pharmacist before following any medical regimen to see if it is safe and effective for you  © 2014 [address] is for End User's use only and may not be sold, redistributed or otherwise used for commercial purposes  All illustrations and images included in CareNotes® are the copyrighted property of A D [employer] M [employer]  or Jorje Carrero

## 2018-02-06 LAB
BACTERIA UR CULT: ABNORMAL
BACTERIA UR CULT: ABNORMAL

## 2018-02-22 ENCOUNTER — OFFICE VISIT (OUTPATIENT)
Dept: FAMILY MEDICINE CLINIC | Facility: CLINIC | Age: 28
End: 2018-02-22
Payer: COMMERCIAL

## 2018-02-22 VITALS
BODY MASS INDEX: 28.99 KG/M2 | TEMPERATURE: 98.3 F | DIASTOLIC BLOOD PRESSURE: 80 MMHG | HEIGHT: 63 IN | WEIGHT: 163.6 LBS | SYSTOLIC BLOOD PRESSURE: 110 MMHG

## 2018-02-22 DIAGNOSIS — R07.89 ANTERIOR CHEST WALL PAIN: Primary | ICD-10-CM

## 2018-02-22 PROBLEM — IMO0002 CHRONIC MIGRAINE: Status: ACTIVE | Noted: 2017-06-22

## 2018-02-22 PROCEDURE — 99213 OFFICE O/P EST LOW 20 MIN: CPT | Performed by: FAMILY MEDICINE

## 2018-02-22 PROCEDURE — 3725F SCREEN DEPRESSION PERFORMED: CPT | Performed by: FAMILY MEDICINE

## 2018-02-22 RX ORDER — ONDANSETRON 4 MG/1
TABLET, ORALLY DISINTEGRATING ORAL EVERY 8 HOURS PRN
COMMUNITY
Start: 2017-09-19 | End: 2018-04-05

## 2018-02-22 RX ORDER — ELETRIPTAN HYDROBROMIDE 40 MG/1
1 TABLET, FILM COATED ORAL
COMMUNITY
Start: 2017-10-18 | End: 2018-03-22

## 2018-02-22 RX ORDER — DICYCLOMINE HCL 20 MG
1 TABLET ORAL 2 TIMES DAILY
COMMUNITY
Start: 2017-12-06 | End: 2018-03-22

## 2018-02-22 RX ORDER — PREDNISONE 10 MG/1
10 TABLET ORAL DAILY
Qty: 30 TABLET | Refills: 0 | Status: SHIPPED | OUTPATIENT
Start: 2018-02-22 | End: 2018-03-22

## 2018-02-22 RX ORDER — HYOSCYAMINE SULFATE 0.125 MG
1 TABLET ORAL
COMMUNITY
Start: 2018-01-22 | End: 2018-03-22

## 2018-02-22 RX ORDER — MELOXICAM 15 MG/1
TABLET ORAL
COMMUNITY
Start: 2018-01-30 | End: 2018-05-24

## 2018-02-22 RX ORDER — AZITHROMYCIN 250 MG/1
TABLET, FILM COATED ORAL
Qty: 6 TABLET | Refills: 0 | Status: SHIPPED | OUTPATIENT
Start: 2018-02-22 | End: 2018-02-26

## 2018-02-22 RX ORDER — MULTIVIT-MIN/IRON/FOLIC ACID/K 18-600-40
1 CAPSULE ORAL DAILY
COMMUNITY
Start: 2014-08-11 | End: 2019-05-06 | Stop reason: SDUPTHER

## 2018-02-22 RX ORDER — PROCHLORPERAZINE MALEATE 10 MG
TABLET ORAL
COMMUNITY
Start: 2017-06-22 | End: 2018-09-14 | Stop reason: SDUPTHER

## 2018-02-22 RX ORDER — CHOLECALCIFEROL (VITAMIN D3) 1250 MCG
1 CAPSULE ORAL WEEKLY
COMMUNITY
Start: 2016-03-16 | End: 2018-05-24

## 2018-02-22 RX ORDER — PANTOPRAZOLE SODIUM 40 MG/1
1 TABLET, DELAYED RELEASE ORAL DAILY
COMMUNITY
Start: 2017-12-22 | End: 2018-03-22

## 2018-02-22 RX ORDER — OLANZAPINE 5 MG/1
TABLET ORAL
COMMUNITY
Start: 2017-10-17 | End: 2018-03-22

## 2018-02-22 NOTE — PROGRESS NOTES
Assessment/Plan:    No problem-specific Assessment & Plan notes found for this encounter  Diagnoses and all orders for this visit:    Anterior chest wall pain  -     azithromycin (ZITHROMAX) 250 mg tablet; Take 2 tablets today then 1 tablet daily x 4 days  -     predniSONE 10 mg tablet; Take 1 tablet (10 mg total) by mouth daily 4 pills daily with meals for 3 days then 3 pills daily for 3 days then 2 pills daily for 3 days then 1 pill daily for 3 days    Other orders  -     dicyclomine (BENTYL) 20 mg tablet; Take 1 tablet by mouth 2 (two) times a day  -     eletriptan (RELPAX) 40 MG tablet; Take 1 tablet by mouth  -     hyoscyamine (ANASPAZ,LEVSIN) 0 125 MG tablet; Take 1 tablet by mouth  -     OLANZapine (ZyPREXA) 5 mg tablet; Take by mouth  -     ondansetron (ZOFRAN-ODT) 4 mg disintegrating tablet; Take by mouth every 8 (eight) hours as needed  -     pantoprazole (PROTONIX) 40 mg tablet; Take 1 tablet by mouth daily  -     prochlorperazine (COMPAZINE) 10 mg tablet; Take by mouth  -     Cholecalciferol (VITAMIN D) 2000 units CAPS; Take 1 capsule by mouth daily  -     Cholecalciferol (VITAMIN D3) 76491 units CAPS; Take 1 capsule by mouth once a week  -     meloxicam (MOBIC) 15 mg tablet;           Subjective:   Chief Complaint   Patient presents with    Chest Pain    Chills    Fatigue    Nausea        Patient ID: Matthew Guillaume is a 33 y o  female  Patient is here with chest pain chills fatigue and nausea over the past 2 days  Patient status post Er  Patient had chest x-ray and EKG and laboratory studies done which were normal  Patient completed steroids  The following portions of the patient's history were reviewed and updated as appropriate: allergies, current medications, past family history, past medical history, past social history, past surgical history and problem list     Review of Systems   Constitutional: Positive for chills, fatigue and fever  HENT: Positive for postnasal drip  Eyes: Negative  Respiratory: Positive for cough  Cardiovascular: Positive for chest pain  Gastrointestinal: Negative  Endocrine: Negative  Genitourinary: Negative  Musculoskeletal: Positive for arthralgias  Skin: Negative  Allergic/Immunologic: Negative  Neurological: Negative  Hematological: Negative  Psychiatric/Behavioral: Negative  Objective:      /80 (BP Location: Left arm, Patient Position: Sitting, Cuff Size: Standard)   Temp 98 3 °F (36 8 °C) (Tympanic)   Ht 5' 3" (1 6 m)   Wt 74 2 kg (163 lb 9 6 oz)   LMP 02/01/2018   BMI 28 98 kg/m²          Physical Exam   Constitutional: She is oriented to person, place, and time  She appears well-developed and well-nourished  No distress  HENT:   Head: Normocephalic  Right Ear: External ear normal    Left Ear: External ear normal    Mouth/Throat: Oropharynx is clear and moist  No oropharyngeal exudate  Eyes: EOM are normal  Pupils are equal, round, and reactive to light  Right eye exhibits no discharge  Left eye exhibits no discharge  No scleral icterus  Neck: Normal range of motion  Neck supple  No thyromegaly present  Cardiovascular: Normal rate, regular rhythm, normal heart sounds and intact distal pulses  Exam reveals no gallop and no friction rub  No murmur heard  Pulmonary/Chest: Effort normal and breath sounds normal  No respiratory distress  She has no wheezes  She has no rales  She exhibits no tenderness  Abdominal: Soft  Bowel sounds are normal  She exhibits no distension  There is no tenderness  There is no rebound and no guarding  Musculoskeletal: Normal range of motion  She exhibits tenderness  She exhibits no edema  Anterior chest wall pain with deep breaths   Lymphadenopathy:     She has no cervical adenopathy  Neurological: She is oriented to person, place, and time  No cranial nerve deficit  She exhibits normal muscle tone  Coordination normal    Skin: Skin is warm and dry   No rash noted  She is not diaphoretic  No erythema  No pallor  Psychiatric: She has a normal mood and affect  Her behavior is normal  Judgment and thought content normal    Nursing note and vitals reviewed

## 2018-03-06 ENCOUNTER — CLINICAL SUPPORT (OUTPATIENT)
Dept: FAMILY MEDICINE CLINIC | Facility: CLINIC | Age: 28
End: 2018-03-06
Payer: COMMERCIAL

## 2018-03-06 DIAGNOSIS — E53.8 VITAMIN B12 DEFICIENCY: Primary | ICD-10-CM

## 2018-03-06 RX ORDER — CYANOCOBALAMIN 1000 UG/ML
1000 INJECTION INTRAMUSCULAR; SUBCUTANEOUS
Status: DISCONTINUED | OUTPATIENT
Start: 2018-03-06 | End: 2019-01-11

## 2018-03-06 RX ADMIN — CYANOCOBALAMIN 1000 MCG: 1000 INJECTION INTRAMUSCULAR; SUBCUTANEOUS at 14:51

## 2018-03-08 DIAGNOSIS — G43.901 MIGRAINE WITH STATUS MIGRAINOSUS, NOT INTRACTABLE, UNSPECIFIED MIGRAINE TYPE: Primary | ICD-10-CM

## 2018-03-13 ENCOUNTER — PROCEDURE VISIT (OUTPATIENT)
Dept: NEUROLOGY | Facility: CLINIC | Age: 28
End: 2018-03-13
Payer: COMMERCIAL

## 2018-03-13 VITALS — TEMPERATURE: 98.1 F | SYSTOLIC BLOOD PRESSURE: 110 MMHG | DIASTOLIC BLOOD PRESSURE: 70 MMHG

## 2018-03-13 DIAGNOSIS — G43.709 CHRONIC MIGRAINE WITHOUT AURA WITHOUT STATUS MIGRAINOSUS, NOT INTRACTABLE: Primary | ICD-10-CM

## 2018-03-13 PROCEDURE — 64615 CHEMODENERV MUSC MIGRAINE: CPT | Performed by: PHYSICIAN ASSISTANT

## 2018-03-13 NOTE — PROGRESS NOTES
Chemodenervation  Date/Time: 3/13/2018 12:44 PM  Performed by: Frances Welch  Authorized by: Frances Welch     Pre-procedure details:     Prepped With: Alcohol    Procedure details:     Position:  Upright  Botox:     Botox Type:  Type A    Brand:  Botox    mL's of Botulinum Toxin:  155    Final Concentration per CC:  50 units    Needle Gauge:  30 G 2 5 inch  Procedures:     Botox Procedures: chronic headache      Botox Procedures comment:  Chronic migraine    Indications: migraines      Indications comment:  Chronic migraine    Date of last injection:  11/28/2017  Injection Location:     Head / Face:  L , R , L frontalis, R frontalis, R inferior cervical paraspinal, L inferior cervical paraspinal, L medial occipitalis, R medial occipitalis, L lateral occipitalis, R lateral occipitalis, procerus, L temporalis, R temporalis, R superior trapezius and L superior trapezius    L  injection amount:  5 unit(s)    R  injection amount:  5 unit(s)    L lateral frontalis:  5 unit(s)    R lateral frontalis:  5 unit(s)    L medial frontalis:  5 unit(s)    R medial frontalis:  5 unit(s)    L temporalis injection amount:  20 unit(s)    R temporalis injection amount:  20 unit(s)    Procerus injection amount:  5 unit(s)    L lateral occipitalis injection amount:  5 unit(s)    R lateral occipitalis injection amount:  5 unit(s)    L medial occipitalis injection amount:  10 unit(s)    R medial occipitalis injection amount:  10 unit(s)    L inferior cervical paraspinal injection amount:  10 unit(s)    R inferior cervical paraspinal injection amount:  10 unit(s)    L superior trapezius injection amount:  15 unit(s)    R superior trapezius injection amount:  15 unit(s)  Total Units:     Total units used:  155    Total units discarded:  45  Post-procedure details:     Chemodenervation:  Chronic migraine    Patient tolerance of procedure:   Tolerated well, no immediate complications

## 2018-03-22 ENCOUNTER — OFFICE VISIT (OUTPATIENT)
Dept: NEUROLOGY | Facility: CLINIC | Age: 28
End: 2018-03-22
Payer: COMMERCIAL

## 2018-03-22 VITALS
DIASTOLIC BLOOD PRESSURE: 59 MMHG | HEIGHT: 63 IN | SYSTOLIC BLOOD PRESSURE: 112 MMHG | BODY MASS INDEX: 28.51 KG/M2 | WEIGHT: 160.9 LBS | HEART RATE: 74 BPM

## 2018-03-22 DIAGNOSIS — G43.901 MIGRAINE WITH STATUS MIGRAINOSUS, NOT INTRACTABLE, UNSPECIFIED MIGRAINE TYPE: ICD-10-CM

## 2018-03-22 DIAGNOSIS — IMO0002 CHRONIC MIGRAINE: Primary | ICD-10-CM

## 2018-03-22 PROCEDURE — 99024 POSTOP FOLLOW-UP VISIT: CPT | Performed by: PSYCHIATRY & NEUROLOGY

## 2018-03-22 RX ORDER — CELECOXIB 200 MG/1
CAPSULE ORAL
COMMUNITY
Start: 2018-03-15 | End: 2018-05-24

## 2018-03-22 RX ORDER — HYDROXYCHLOROQUINE SULFATE 200 MG/1
TABLET, FILM COATED ORAL
COMMUNITY
Start: 2018-03-15 | End: 2018-04-05

## 2018-03-22 NOTE — PROGRESS NOTES
Patient ID: Jose Chacon is a 33 y o  female  Assessment/Plan:    No problem-specific Assessment & Plan notes found for this encounter  Diagnoses and all orders for this visit:    Chronic migraine    Migraine with status migrainosus, not intractable, unspecified migraine type  -     Ambulatory referral to Neurology    Other orders  -     hydroxychloroquine (PLAQUENIL) 200 mg tablet;   -     celecoxib (CeleBREX) 200 mg capsule; Patient Instructions   Chronic migraine:  Ms Ian Mittal presents for follow-up with regard to her chronic migraine headaches  She reports an excellent response to her current combination of preventative medications including Botox therapy  Specifically she  Is experiencing at least 7 days of relief compared to her prior baseline with the Botox therapy  Her neurologic exam is reasonably normal in the office today with no clear signs of overmedication  We did spend a significant amount of time talking about medications and side effects  At this point in time I would consider her condition to be improved, and we will plan to continue her current regimen of symptomatic care  If we were to decide to begin to wean down 1 of her medications I think would be reasonable to begin with either Topamax or Inderal, but we will defer this for the time being, at least until her Plaquenil dosing is stable and she is on a stable regimen of anti-inflammatories  - to abort a migraine in progress I would again suggest that she continue to use her combination of Imitrex, Compazine, and naproxen  Because she takes other anti-inflammatories she should limit the use of naproxen whenever possible and the medication should be taken with food/ milk /antacids to help protect her stomach  - we will plan to otherwise continue Topamax / Inderal/ Neurontin at her current doses    We will plan to continue to administer Botox injections using the chronic migraine protocol every 90 days     -I would advise her to continue to keep track of her migraines using application on her phone and to contact our office if she begins to experience any significant side effects so that doses could be much lead  I will plan for her to return to the office at her currently scheduled Botox appointment and to return to see us for a clinic visit in 8 months  Subjective:    HPI    The patient presents with her mom for follow-up with regard to her chronic migraines  She reports that her migraines have improved significantly with the Botox therapy, particularly she has had greater than 7 days of migraine relief  She reports no significant side effects on her current medications  We did review her medication list in detail to ensure that is up-to-date deny fevers much as possible  We discussed whether not she would like to begin to titrate down any of her medications but at this point we will plan to keep things stable  In the interval since her last visit to the office she was diagnosed with fibromyalgia as well as ankylosing spondylitis and is currently receiving Plaquenil therapy  Objective: There were no vitals taken for this visit  Physical Exam    Neurological Exam    At the time of my evaluation she was awake, alert, and rightly interactive  Her affect was somewhat flat  She had no movement in the brow bilaterally consistent with her recent Botox therapy  Otherwise there were no obvious focal neurologic deficits or lateralizing signs  ROS:    Review of Systems   Constitutional: Positive for appetite change and fatigue  HENT: Positive for sinus pressure  Eyes: Negative  Respiratory: Negative  Cardiovascular: Negative  Gastrointestinal: Positive for abdominal pain and diarrhea  Endocrine: Negative  Genitourinary: Negative  Musculoskeletal: Positive for back pain, myalgias and neck pain  Skin: Negative  Allergic/Immunologic: Negative      Neurological: Positive for tremors and headaches  Hematological: Negative  Psychiatric/Behavioral: Positive for sleep disturbance  The patient is nervous/anxious           Depression and mood swings

## 2018-03-22 NOTE — PATIENT INSTRUCTIONS
Chronic migraine:  Ms Blas Hart presents for follow-up with regard to her chronic migraine headaches  She reports an excellent response to her current combination of preventative medications including Botox therapy  Specifically she  Is experiencing at least 7 days of relief compared to her prior baseline with the Botox therapy  Her neurologic exam is reasonably normal in the office today with no clear signs of overmedication  We did spend a significant amount of time talking about medications and side effects  At this point in time I would consider her condition to be improved, and we will plan to continue her current regimen of symptomatic care  If we were to decide to begin to wean down 1 of her medications I think would be reasonable to begin with either Topamax or Inderal, but we will defer this for the time being, at least until her Plaquenil dosing is stable and she is on a stable regimen of anti-inflammatories  - to abort a migraine in progress I would again suggest that she continue to use her combination of Imitrex, Compazine, and naproxen  Because she takes other anti-inflammatories she should limit the use of naproxen whenever possible and the medication should be taken with food/ milk /antacids to help protect her stomach  - we will plan to otherwise continue Topamax / Inderal/ Neurontin at her current doses  We will plan to continue to administer Botox injections using the chronic migraine protocol every 90 days     -I would advise her to continue to keep track of her migraines using application on her phone and to contact our office if she begins to experience any significant side effects so that doses could be much lead  I will plan for her to return to the office at her currently scheduled Botox appointment and to return to see us for a clinic visit in 8 months

## 2018-03-26 ENCOUNTER — TELEPHONE (OUTPATIENT)
Dept: FAMILY MEDICINE CLINIC | Facility: CLINIC | Age: 28
End: 2018-03-26

## 2018-03-28 DIAGNOSIS — F32.0 MILD SINGLE CURRENT EPISODE OF MAJOR DEPRESSIVE DISORDER (HCC): Primary | ICD-10-CM

## 2018-03-28 RX ORDER — SERTRALINE HYDROCHLORIDE 100 MG/1
100 TABLET, FILM COATED ORAL DAILY
Qty: 90 TABLET | Refills: 1 | Status: SHIPPED | OUTPATIENT
Start: 2018-03-28 | End: 2018-03-28 | Stop reason: SDUPTHER

## 2018-03-29 RX ORDER — SERTRALINE HYDROCHLORIDE 100 MG/1
100 TABLET, FILM COATED ORAL 2 TIMES DAILY
Qty: 90 TABLET | Refills: 0 | Status: SHIPPED | OUTPATIENT
Start: 2018-03-29 | End: 2018-05-24 | Stop reason: SDUPTHER

## 2018-04-04 ENCOUNTER — TELEPHONE (OUTPATIENT)
Dept: NEUROLOGY | Facility: CLINIC | Age: 28
End: 2018-04-04

## 2018-04-04 DIAGNOSIS — G44.59 OTHER COMPLICATED HEADACHE SYNDROME: Primary | ICD-10-CM

## 2018-04-04 NOTE — TELEPHONE ENCOUNTER
Pt called requesting a refill on sumatriptan 100 mg, take 1 tablet for migraine relief-initially prescribed by PCP  She is requesting if you can take over and refill it  Per pt, pcp declined to refill her sumatriptan  When did migraine start? last night @ 8 pm   Location/Description: throbbing pain, sharp pain, bilateral in the frontal area  Pain scale: 6  Associated symptoms:nausea, sonophobia, photophobia  Precipitating factors: position  Alleviating factors: sumatriptan 100 mg as prescribed  Educated patient that we do not recommend they take any triptan or OTC med more than 3 days in any 1 week due to medication over use headache and CVA risk with overuse  Pt verbalized understanding  Current migraine medications are confirmed as:   Sumatriptan 100 mg as ordered and effective-requesting a refill  Naproxen 500 mg bid  Topiramate 50 mg, 3 tabs at hs  Preventative medications: Botox therapy  Medications tried in the past? Tried depakote, butalbital-apap-caffeine, ketorolac and promethazine  Not tried (steroid), depakote, or olanzapine         [phone]

## 2018-04-05 ENCOUNTER — OFFICE VISIT (OUTPATIENT)
Dept: FAMILY MEDICINE CLINIC | Facility: CLINIC | Age: 28
End: 2018-04-05
Payer: COMMERCIAL

## 2018-04-05 VITALS
WEIGHT: 158.6 LBS | HEIGHT: 63 IN | BODY MASS INDEX: 28.1 KG/M2 | DIASTOLIC BLOOD PRESSURE: 70 MMHG | SYSTOLIC BLOOD PRESSURE: 100 MMHG

## 2018-04-05 DIAGNOSIS — G43.709 CHRONIC MIGRAINE WITHOUT AURA WITHOUT STATUS MIGRAINOSUS, NOT INTRACTABLE: Primary | ICD-10-CM

## 2018-04-05 PROCEDURE — 99213 OFFICE O/P EST LOW 20 MIN: CPT | Performed by: FAMILY MEDICINE

## 2018-04-05 RX ORDER — KETOROLAC TROMETHAMINE 30 MG/ML
60 INJECTION, SOLUTION INTRAMUSCULAR; INTRAVENOUS ONCE
Status: COMPLETED | OUTPATIENT
Start: 2018-04-05 | End: 2018-04-05

## 2018-04-05 RX ORDER — SUMATRIPTAN 100 MG/1
100 TABLET, FILM COATED ORAL ONCE AS NEEDED
Qty: 10 TABLET | Refills: 1 | Status: SHIPPED | OUTPATIENT
Start: 2018-04-05 | End: 2018-12-11 | Stop reason: SDUPTHER

## 2018-04-05 RX ORDER — SUMATRIPTAN 100 MG/1
100 TABLET, FILM COATED ORAL ONCE AS NEEDED
Qty: 10 TABLET | Refills: 5 | Status: SHIPPED | OUTPATIENT
Start: 2018-04-05 | End: 2018-04-17 | Stop reason: SDUPTHER

## 2018-04-05 RX ORDER — PROMETHAZINE HYDROCHLORIDE 25 MG/ML
25 INJECTION, SOLUTION INTRAMUSCULAR; INTRAVENOUS ONCE
Status: COMPLETED | OUTPATIENT
Start: 2018-04-05 | End: 2019-01-11

## 2018-04-05 RX ORDER — SULFASALAZINE 500 MG/1
TABLET ORAL
COMMUNITY
End: 2018-11-08 | Stop reason: SDUPTHER

## 2018-04-05 RX ADMIN — CYANOCOBALAMIN 1000 MCG: 1000 INJECTION INTRAMUSCULAR; SUBCUTANEOUS at 15:07

## 2018-04-05 RX ADMIN — KETOROLAC TROMETHAMINE 60 MG: 30 INJECTION, SOLUTION INTRAMUSCULAR; INTRAVENOUS at 15:06

## 2018-04-05 NOTE — PROGRESS NOTES
Assessment/Plan:    No problem-specific Assessment & Plan notes found for this encounter  Diagnoses and all orders for this visit:    Chronic migraine without aura without status migrainosus, not intractable  -     ketorolac (TORADOL) 60 mg/2 mL IM injection 60 mg; Inject 2 mL (60 mg total) into the shoulder, thigh, or buttocks once   -     promethazine (PHENERGAN) injection 25 mg; Inject 1 mL (25 mg total) into the shoulder, thigh, or buttocks once   -     SUMAtriptan (IMITREX) 100 mg tablet; Take 1 tablet (100 mg total) by mouth once as needed for migraine for up to 1 dose    Other orders  -     sulfaSALAzine (AZULFIDINE) 500 mg tablet; Take by mouth          Subjective:      Patient ID: Justine Rucker is a 33 y o  female  Patient is here with migraine over the past 2 days  Patient ran out of sumatriptan  Patient did use naproxen  Patient with photophobia  Patient also with nausea no vomiting or fever  No new neurologic symptoms such as weakness or numbness of the upper extremities or lower extremities  No URI symptoms  The following portions of the patient's history were reviewed and updated as appropriate: allergies, current medications, past family history, past medical history, past social history, past surgical history and problem list     Review of Systems   Constitutional: Negative  HENT: Negative  Eyes: Negative  Respiratory: Negative  Cardiovascular: Negative  Gastrointestinal: Negative  Endocrine: Negative  Genitourinary: Negative  Musculoskeletal: Negative  Skin: Negative  Allergic/Immunologic: Negative  Neurological: Positive for headaches  Hematological: Negative  Psychiatric/Behavioral: Negative            Objective:      /70 (BP Location: Left arm, Patient Position: Sitting, Cuff Size: Standard)   Ht 5' 3" (1 6 m)   Wt 71 9 kg (158 lb 9 6 oz)   BMI 28 09 kg/m²          Physical Exam   Constitutional: She is oriented to person, place, and time  She appears well-developed and well-nourished  No distress  HENT:   Head: Normocephalic  Right Ear: External ear normal    Left Ear: External ear normal    Mouth/Throat: Oropharynx is clear and moist  No oropharyngeal exudate  Eyes: EOM are normal  Pupils are equal, round, and reactive to light  Right eye exhibits no discharge  Left eye exhibits no discharge  No scleral icterus  Neck: Normal range of motion  Neck supple  No thyromegaly present  Cardiovascular: Normal rate, regular rhythm, normal heart sounds and intact distal pulses  Exam reveals no gallop and no friction rub  No murmur heard  Pulmonary/Chest: Effort normal and breath sounds normal  No respiratory distress  She has no wheezes  She has no rales  She exhibits no tenderness  Abdominal: Soft  Bowel sounds are normal  She exhibits no distension  There is no tenderness  There is no rebound and no guarding  Musculoskeletal: Normal range of motion  She exhibits no edema or tenderness  Lymphadenopathy:     She has no cervical adenopathy  Neurological: She is oriented to person, place, and time  No cranial nerve deficit  She exhibits normal muscle tone  Coordination normal    Skin: Skin is warm and dry  No rash noted  She is not diaphoretic  No erythema  No pallor  Psychiatric: She has a normal mood and affect  Her behavior is normal  Judgment and thought content normal    Nursing note and vitals reviewed

## 2018-04-05 NOTE — TELEPHONE ENCOUNTER
Patient called and stated her Rx was not at pharmacy, stated she thought this was taken care of already and that she is in a lot of pain

## 2018-04-12 ENCOUNTER — TELEPHONE (OUTPATIENT)
Dept: FAMILY MEDICINE CLINIC | Facility: CLINIC | Age: 28
End: 2018-04-12

## 2018-04-12 DIAGNOSIS — K58.9 IRRITABLE BOWEL SYNDROME WITHOUT DIARRHEA: Primary | ICD-10-CM

## 2018-04-16 DIAGNOSIS — K58.9 IRRITABLE BOWEL SYNDROME, UNSPECIFIED TYPE: Primary | ICD-10-CM

## 2018-04-16 NOTE — PROGRESS NOTES
GI office called today Swathi Bocanegra) requesting referral order be placed in [facility] for Dr Keegan Robles  Order placed  Call Back with any problems is [phone]  Thank you

## 2018-04-17 ENCOUNTER — OFFICE VISIT (OUTPATIENT)
Dept: GASTROENTEROLOGY | Facility: MEDICAL CENTER | Age: 28
End: 2018-04-17
Payer: COMMERCIAL

## 2018-04-17 ENCOUNTER — TELEPHONE (OUTPATIENT)
Dept: GASTROENTEROLOGY | Facility: CLINIC | Age: 28
End: 2018-04-17

## 2018-04-17 VITALS
BODY MASS INDEX: 28.35 KG/M2 | HEART RATE: 79 BPM | SYSTOLIC BLOOD PRESSURE: 108 MMHG | HEIGHT: 63 IN | DIASTOLIC BLOOD PRESSURE: 60 MMHG | TEMPERATURE: 97.6 F | WEIGHT: 160 LBS

## 2018-04-17 DIAGNOSIS — R79.82 ELEVATED C-REACTIVE PROTEIN (CRP): ICD-10-CM

## 2018-04-17 DIAGNOSIS — R19.8 ALTERNATING CONSTIPATION AND DIARRHEA: Primary | ICD-10-CM

## 2018-04-17 DIAGNOSIS — R14.0 ABDOMINAL BLOATING: ICD-10-CM

## 2018-04-17 DIAGNOSIS — R10.31 RIGHT LOWER QUADRANT ABDOMINAL PAIN: ICD-10-CM

## 2018-04-17 DIAGNOSIS — R19.8 ABNORMAL BOWEL HABITS: ICD-10-CM

## 2018-04-17 PROCEDURE — 99244 OFF/OP CNSLTJ NEW/EST MOD 40: CPT | Performed by: INTERNAL MEDICINE

## 2018-04-17 NOTE — PATIENT INSTRUCTIONS
Recommend Metamucil as a fiber supplementation,  She is instructed to titrate the dose to effect, start one big tbsp in the large glass of water daily and increase every three days as needed  For diarrhea, she can attempt Imodium  Start with half a pill 30 min before eating during diarrhea cycles      PT IS SCHED AT [facility] FOR COLON WITH DR Gabrielle Rangel ON 05/08/2018 M/A GAVE PT INSTRUCTIONS TO SUPREP AND PROCEDURE PT IS AWARE SHE WILL GET A CALL THE DAY BEFORE WITH TIME

## 2018-04-17 NOTE — TELEPHONE ENCOUNTER
Please have her  suprep sample from office or provide miralax/dulcolax instructions, she can get these over the counter   Thank you

## 2018-04-17 NOTE — LETTER
April 17, 2018     Mame Ferrari, [address] [address]    Patient: Jovani Russell   YOB: 1990   Date of Visit: 4/17/2018       Dear Dr Ko Aldana:    Thank you for referring Jose R Mccurdy to me for evaluation  Below are my notes for this consultation  If you have questions, please do not hesitate to call me  I look forward to following your patient along with you  Sincerely,      CHIQUITA Rivera  [facility]  Mobile: [phone]  Available on [employer]  [email or web address]  [email or web address]           CC: No Recipients  Dominique Garza MD  4/17/2018  8:54 PM  Sign at close encounter  [facility] - Outpatient Consultation  Jovani Russell 33 y o  female MRN: [id number]  Encounter: [id number]      PCP: Mame Ferrari DO  Referring: Mame Ferrari DO  [address], [address] [place] [address] Blvd:      1  Alternating constipation and diarrhea  2  Abnormal bowel habits  3  Right lower quadrant abdominal pain  4  Abdominal bloating  5  Elevated C-reactive protein (CRP)  Symptoms of alternating constipation and diarrhea, abdominal bloating and abdominal pain most consistent with functional abdominal pain  Recent laboratory examination reviewed at [facility] done which demonstrates elevated C reactive protein and sed rate  This is difficult to interpret in the setting of her rheumatological diseases including ankylosing spondylitis  However the presence of ankylosing spondylitis with her increased risk for inflammatory bowel disease  Will obtain fecal calprotectin to evaluate for inflammatory conditions of the colon  Will plan for colonoscopy to evaluate  In the interim she is recommended to try fiber supplementation with Metamucil, she should titrate the dose to effect  Additionally during her diarrhea cycles, she should start imodium as needed     - Ambulatory referral to Gastroenterology  - Case request operating room: COLONOSCOPY; Standing  - Case request operating room: COLONOSCOPY  - Calprotectin,Fecal; Future  - Na Sulfate-K Sulfate-Mg Sulf (SUPREP BOWEL PREP KIT) 17 5-3 13-1 6 GM/180ML SOLN; Take 177 mL by mouth once for 1 dose  Dispense: 2 Bottle; Refill: 0        ______________________________________________________________________    HPI:      Patient is 81-year-old female referred to me for abnormal bowel habits, abdominal pain  She has a past medical history of fibromyalgia, ankylosing spondylitis, chronic migraine, anxiety disorder  She relates cycles of alternating diarrhea and constipation  Her diarrhea cycles last for up to two weeks at a time, and associated with postprandial fecal urgency resulting in passage of several watery/loose stools per day  She ends up having 4-5 bowel movements in one day  She avoids eating secondary to these diarrhea symptoms as this is only thing that improves her diarrhea  These cycles alternate with constipation, for which she will not move her bowels for up to one week at a time  Both cycles are associated with increasing generalized abdominal pain, which is worse in the right lower quadrant  Abdominal pain does improve with bowel movements  She has noted no hematochezia, melena  She does relate several weight fluctuations with weight loss of to 20-25 lb associated with diarrhea and loss of appetite  She regains this we quickly during her constipation cycles  She has last seen by GI at this outside clinic, approximately five years ago where she was diagnosed with irritable bowel syndrome  She was prescribed Linzess, she is unclear of the dose, however this resulted in severe diarrhea and she stopped this  She has also attempted Imodium, one pill daily, which resulted in constipation  She has a maternal aunt with ulcerative colitis  REVIEW OF SYSTEMS:    CONSTITUTIONAL: Denies any fever, chills, rigors, and weight loss  HEENT: No earache or tinnitus   Denies hearing loss or visual disturbances  CARDIOVASCULAR: No chest pain or palpitations  RESPIRATORY: Denies any cough, hemoptysis, shortness of breath or dyspnea on exertion  GASTROINTESTINAL: As noted in the History of Present Illness  GENITOURINARY: No problems with urination  Denies any hematuria or dysuria  NEUROLOGIC: No dizziness or vertigo, denies headaches  MUSCULOSKELETAL: Denies any muscle or joint pain  SKIN: Denies skin rashes or itching  ENDOCRINE: Denies excessive thirst  Denies intolerance to heat or cold  PSYCHOSOCIAL: Denies depression or anxiety  Denies any recent memory loss         Historical Information   Past Medical History:   Diagnosis Date    Fibromyalgia, primary     Migraine      Past Surgical History:   Procedure Laterality Date    TONSILLECTOMY      WISDOM TOOTH EXTRACTION       Social History   History   Alcohol Use No     History   Drug Use No     History   Smoking Status    Never Smoker   Smokeless Tobacco    Never Used     Family History   Problem Relation Age of Onset    Rheum arthritis Mother     Psoriasis Mother     Other Mother     Hypertension Mother     Diabetes unspecified Mother        Meds/Allergies       Current Outpatient Prescriptions:     celecoxib (CeleBREX) 200 mg capsule    Cholecalciferol (VITAMIN D) 2000 units CAPS    Cholecalciferol (VITAMIN D3) 40070 units CAPS    clonazePAM (KlonoPIN) 1 mg tablet    etonogestrel-ethinyl estradiol (NUVARING) 0 12-0 015 MG/24HR vaginal ring    gabapentin (NEURONTIN) 300 mg capsule    LORazepam (ATIVAN) 0 5 mg tablet    meloxicam (MOBIC) 15 mg tablet    methocarbamol (ROBAXIN) 500 mg tablet    montelukast (SINGULAIR) 10 mg tablet    naproxen (NAPROSYN) 500 mg tablet    onabotulinumtoxin A (BOTOX) 100 units    prochlorperazine (COMPAZINE) 10 mg tablet    propranolol (INDERAL LA) 60 mg 24 hr capsule    sertraline (ZOLOFT) 100 mg tablet    sulfaSALAzine (AZULFIDINE) 500 mg tablet    SUMAtriptan (IMITREX) 100 mg tablet    topiramate (TOPAMAX) 50 MG tablet    Na Sulfate-K Sulfate-Mg Sulf (SUPREP BOWEL PREP KIT) 17 5-3 13-1 6 GM/180ML SOLN    Current Facility-Administered Medications:     cyanocobalamin injection 1,000 mcg, 1,000 mcg, Intramuscular, Q30 Days, 1,000 mcg at 04/05/18 1507    promethazine (PHENERGAN) injection 25 mg, 25 mg, Intramuscular, Once    Allergies   Allergen Reactions    Depakote Er  [Valproic Acid]      Other reaction(s): dilated pupils, "schizi"    Desvenlafaxine      Other reaction(s): state of confusion           Objective     Blood pressure 108/60, pulse 79, temperature 97 6 °F (36 4 °C), temperature source Tympanic, height 5' 3" (1 6 m), weight 72 6 kg (160 lb)  Body mass index is 28 34 kg/m²  PHYSICAL EXAM:      General Appearance:   Alert, cooperative, no distress   HEENT:   Normocephalic, atraumatic, anicteric      Neck:  Supple, symmetrical, trachea midline   Lungs:   Clear to auscultation bilaterally; no rales, rhonchi or wheezing; respirations unlabored    Heart[de-identified]   Regular rate and rhythm; no murmur, rub, or gallop     Abdomen:   Soft, non-tender, non-distended; normal bowel sounds; no masses, no organomegaly    Genitalia:   Deferred    Rectal:   Deferred    Extremities:  No cyanosis, clubbing or edema    Pulses:  2+ and symmetric    Skin:  No jaundice, rashes, or lesions    Lymph nodes:  No palpable cervical lymphadenopathy        Lab Results:     Lab Results   Component Value Date    WBC 7 07 02/05/2018    HGB 12 9 02/05/2018    HCT 37 7 02/05/2018    MCV 87 02/05/2018     02/05/2018       Lab Results   Component Value Date     02/05/2018    K 4 3 02/05/2018     02/05/2018    CO2 23 02/05/2018    ANIONGAP 11 02/05/2018    BUN 9 02/05/2018    CREATININE 0 72 02/05/2018    GLUCOSE 94 02/05/2018    CALCIUM 9 1 02/05/2018    AST 19 02/05/2018    ALT 20 02/05/2018    ALKPHOS 50 02/05/2018    PROT 7 3 02/05/2018    BILITOT 0 33 02/05/2018    EGFR 116 02/05/2018 No results found for: INR, PROTIME      Radiology Results:   No results found

## 2018-04-17 NOTE — PROGRESS NOTES
[facility] - Outpatient Consultation  Jaret Fontana 33 y o  female MRN: [id number]  Encounter: [id number]      PCP: Angelica Peterson DO  Referring: DO Mercedes Metcalf[address], [address] [place] [address] Blvd:      1  Alternating constipation and diarrhea  2  Abnormal bowel habits  3  Right lower quadrant abdominal pain  4  Abdominal bloating  5  Elevated C-reactive protein (CRP)  Symptoms of alternating constipation and diarrhea, abdominal bloating and abdominal pain most consistent with functional abdominal pain  Recent laboratory examination reviewed at [facility] done which demonstrates elevated C reactive protein and sed rate  This is difficult to interpret in the setting of her rheumatological diseases including ankylosing spondylitis  However the presence of ankylosing spondylitis with her increased risk for inflammatory bowel disease  Will obtain fecal calprotectin to evaluate for inflammatory conditions of the colon  Will plan for colonoscopy to evaluate  In the interim she is recommended to try fiber supplementation with Metamucil, she should titrate the dose to effect  Additionally during her diarrhea cycles, she should start imodium as needed  - Ambulatory referral to Gastroenterology  - Case request operating room: COLONOSCOPY; Standing  - Case request operating room: COLONOSCOPY  - Calprotectin,Fecal; Future  - Na Sulfate-K Sulfate-Mg Sulf (SUPREP BOWEL PREP KIT) 17 5-3 13-1 6 GM/180ML SOLN; Take 177 mL by mouth once for 1 dose  Dispense: 2 Bottle; Refill: 0        ______________________________________________________________________    HPI:      Patient is 69-year-old female referred to me for abnormal bowel habits, abdominal pain  She has a past medical history of fibromyalgia, ankylosing spondylitis, chronic migraine, anxiety disorder  She relates cycles of alternating diarrhea and constipation    Her diarrhea cycles last for up to two weeks at a time, and associated with postprandial fecal urgency resulting in passage of several watery/loose stools per day  She ends up having 4-5 bowel movements in one day  She avoids eating secondary to these diarrhea symptoms as this is only thing that improves her diarrhea  These cycles alternate with constipation, for which she will not move her bowels for up to one week at a time  Both cycles are associated with increasing generalized abdominal pain, which is worse in the right lower quadrant  Abdominal pain does improve with bowel movements  She has noted no hematochezia, melena  She does relate several weight fluctuations with weight loss of to 20-25 lb associated with diarrhea and loss of appetite  She regains this we quickly during her constipation cycles  She has last seen by GI at this outside clinic, approximately five years ago where she was diagnosed with irritable bowel syndrome  She was prescribed Linzess, she is unclear of the dose, however this resulted in severe diarrhea and she stopped this  She has also attempted Imodium, one pill daily, which resulted in constipation  She has a maternal aunt with ulcerative colitis  REVIEW OF SYSTEMS:    CONSTITUTIONAL: Denies any fever, chills, rigors, and weight loss  HEENT: No earache or tinnitus  Denies hearing loss or visual disturbances  CARDIOVASCULAR: No chest pain or palpitations  RESPIRATORY: Denies any cough, hemoptysis, shortness of breath or dyspnea on exertion  GASTROINTESTINAL: As noted in the History of Present Illness  GENITOURINARY: No problems with urination  Denies any hematuria or dysuria  NEUROLOGIC: No dizziness or vertigo, denies headaches  MUSCULOSKELETAL: Denies any muscle or joint pain  SKIN: Denies skin rashes or itching  ENDOCRINE: Denies excessive thirst  Denies intolerance to heat or cold  PSYCHOSOCIAL: Denies depression or anxiety  Denies any recent memory loss         Historical Information   Past Medical History:   Diagnosis Date    Fibromyalgia, primary     Migraine      Past Surgical History:   Procedure Laterality Date    TONSILLECTOMY      WISDOM TOOTH EXTRACTION       Social History   History   Alcohol Use No     History   Drug Use No     History   Smoking Status    Never Smoker   Smokeless Tobacco    Never Used     Family History   Problem Relation Age of Onset    Rheum arthritis Mother     Psoriasis Mother     Other Mother     Hypertension Mother     Diabetes unspecified Mother        Meds/Allergies       Current Outpatient Prescriptions:     celecoxib (CeleBREX) 200 mg capsule    Cholecalciferol (VITAMIN D) 2000 units CAPS    Cholecalciferol (VITAMIN D3) 55657 units CAPS    clonazePAM (KlonoPIN) 1 mg tablet    etonogestrel-ethinyl estradiol (NUVARING) 0 12-0 015 MG/24HR vaginal ring    gabapentin (NEURONTIN) 300 mg capsule    LORazepam (ATIVAN) 0 5 mg tablet    meloxicam (MOBIC) 15 mg tablet    methocarbamol (ROBAXIN) 500 mg tablet    montelukast (SINGULAIR) 10 mg tablet    naproxen (NAPROSYN) 500 mg tablet    onabotulinumtoxin A (BOTOX) 100 units    prochlorperazine (COMPAZINE) 10 mg tablet    propranolol (INDERAL LA) 60 mg 24 hr capsule    sertraline (ZOLOFT) 100 mg tablet    sulfaSALAzine (AZULFIDINE) 500 mg tablet    SUMAtriptan (IMITREX) 100 mg tablet    topiramate (TOPAMAX) 50 MG tablet    Na Sulfate-K Sulfate-Mg Sulf (SUPREP BOWEL PREP KIT) 17 5-3 13-1 6 GM/180ML SOLN    Current Facility-Administered Medications:     cyanocobalamin injection 1,000 mcg, 1,000 mcg, Intramuscular, Q30 Days, 1,000 mcg at 04/05/18 1507    promethazine (PHENERGAN) injection 25 mg, 25 mg, Intramuscular, Once    Allergies   Allergen Reactions    Depakote Er  [Valproic Acid]      Other reaction(s): dilated pupils, "schizi"    Desvenlafaxine      Other reaction(s): state of confusion           Objective     Blood pressure 108/60, pulse 79, temperature 97 6 °F (36 4 °C), temperature source Tympanic, height 5' 3" (1 6 m), weight 72 6 kg (160 lb)  Body mass index is 28 34 kg/m²  PHYSICAL EXAM:      General Appearance:   Alert, cooperative, no distress   HEENT:   Normocephalic, atraumatic, anicteric      Neck:  Supple, symmetrical, trachea midline   Lungs:   Clear to auscultation bilaterally; no rales, rhonchi or wheezing; respirations unlabored    Heart[de-identified]   Regular rate and rhythm; no murmur, rub, or gallop  Abdomen:   Soft, non-tender, non-distended; normal bowel sounds; no masses, no organomegaly    Genitalia:   Deferred    Rectal:   Deferred    Extremities:  No cyanosis, clubbing or edema    Pulses:  2+ and symmetric    Skin:  No jaundice, rashes, or lesions    Lymph nodes:  No palpable cervical lymphadenopathy        Lab Results:     Lab Results   Component Value Date    WBC 7 07 02/05/2018    HGB 12 9 02/05/2018    HCT 37 7 02/05/2018    MCV 87 02/05/2018     02/05/2018       Lab Results   Component Value Date     02/05/2018    K 4 3 02/05/2018     02/05/2018    CO2 23 02/05/2018    ANIONGAP 11 02/05/2018    BUN 9 02/05/2018    CREATININE 0 72 02/05/2018    GLUCOSE 94 02/05/2018    CALCIUM 9 1 02/05/2018    AST 19 02/05/2018    ALT 20 02/05/2018    ALKPHOS 50 02/05/2018    PROT 7 3 02/05/2018    BILITOT 0 33 02/05/2018    EGFR 116 02/05/2018       No results found for: INR, PROTIME      Radiology Results:   No results found

## 2018-04-17 NOTE — TELEPHONE ENCOUNTER
Dr Ling Rapp pt    Pharmacy called in to advise that suprep is not covered by ins  Please advise of any changes

## 2018-04-17 NOTE — TELEPHONE ENCOUNTER
Left detailed message for pt she can buy miralax/dulcolax otc   Yancy Sánchez  New instructions mailed out to pt as well asked the pt to call the office is she has any questions

## 2018-04-24 ENCOUNTER — OFFICE VISIT (OUTPATIENT)
Dept: FAMILY MEDICINE CLINIC | Facility: CLINIC | Age: 28
End: 2018-04-24
Payer: COMMERCIAL

## 2018-04-24 VITALS
BODY MASS INDEX: 28.17 KG/M2 | DIASTOLIC BLOOD PRESSURE: 60 MMHG | HEIGHT: 63 IN | WEIGHT: 159 LBS | SYSTOLIC BLOOD PRESSURE: 100 MMHG

## 2018-04-24 DIAGNOSIS — F32.0 CURRENT MILD EPISODE OF MAJOR DEPRESSIVE DISORDER WITHOUT PRIOR EPISODE (HCC): ICD-10-CM

## 2018-04-24 DIAGNOSIS — G43.709 CHRONIC MIGRAINE WITHOUT AURA WITHOUT STATUS MIGRAINOSUS, NOT INTRACTABLE: Primary | ICD-10-CM

## 2018-04-24 PROBLEM — F32.9 MAJOR DEPRESSIVE DISORDER WITH SINGLE EPISODE: Status: ACTIVE | Noted: 2018-04-24

## 2018-04-24 PROCEDURE — 99213 OFFICE O/P EST LOW 20 MIN: CPT | Performed by: FAMILY MEDICINE

## 2018-04-24 RX ORDER — ERGOCALCIFEROL 1.25 MG/1
CAPSULE ORAL
COMMUNITY
Start: 2018-04-23 | End: 2018-05-24

## 2018-04-24 NOTE — PROGRESS NOTES
Assessment/Plan:    Patient is seeing therapist regarding  Depression for her medical issues  Patient will continue with Zoloft  Patient use Klonopin for sleep  Patient will see Neurology regarding migraines  Follow-up in 3 months  Patient wishes to have intermittently  Diagnoses and all orders for this visit:    Chronic migraine without aura without status migrainosus, not intractable    Current mild episode of major depressive disorder without prior episode ([address] )    Other orders  -     ergocalciferol (VITAMIN D2) 50,000 units; Subjective:      Patient ID: Marcy Beltran is a 33 y o  female  Patient follow-up on migraines  Patient's migraines are slightly worse with initiation of sulfasalazine  Patient is still on Topamax as well as Inderal   Patient also on gabapentin  Patient also getting Botox injections  Patient using naproxen as well as Imitrex as needed for abortive agent  Board of agents are working well  Patient had to migraine headaches last week  Patient still seeing Neurology  The following portions of the patient's history were reviewed and updated as appropriate: allergies, current medications, past family history, past medical history, past social history, past surgical history and problem list     Review of Systems   Constitutional: Negative  HENT: Negative  Eyes: Negative  Respiratory: Negative  Cardiovascular: Negative  Gastrointestinal: Negative  Endocrine: Negative  Genitourinary: Negative  Musculoskeletal: Negative  Skin: Negative  Allergic/Immunologic: Negative  Neurological: Positive for headaches  Hematological: Negative  Psychiatric/Behavioral: Negative  Objective:      /60 (BP Location: Left arm, Patient Position: Sitting, Cuff Size: Standard)   Ht 5' 3" (1 6 m)   Wt 72 1 kg (159 lb)   BMI 28 17 kg/m²          Physical Exam   Constitutional: She is oriented to person, place, and time   She appears well-developed and well-nourished  No distress  HENT:   Head: Normocephalic  Right Ear: External ear normal    Left Ear: External ear normal    Mouth/Throat: Oropharynx is clear and moist  No oropharyngeal exudate  Eyes: EOM are normal  Pupils are equal, round, and reactive to light  Right eye exhibits no discharge  Left eye exhibits no discharge  No scleral icterus  Neck: Normal range of motion  Neck supple  No thyromegaly present  Cardiovascular: Normal rate, regular rhythm, normal heart sounds and intact distal pulses  Exam reveals no gallop and no friction rub  No murmur heard  Pulmonary/Chest: Effort normal and breath sounds normal  No respiratory distress  She has no wheezes  She has no rales  She exhibits no tenderness  Abdominal: Soft  Bowel sounds are normal  She exhibits no distension  There is no tenderness  There is no rebound and no guarding  Musculoskeletal: Normal range of motion  She exhibits no edema or tenderness  Lymphadenopathy:     She has no cervical adenopathy  Neurological: She is oriented to person, place, and time  No cranial nerve deficit  She exhibits normal muscle tone  Coordination normal    Skin: Skin is warm and dry  No rash noted  She is not diaphoretic  No erythema  No pallor  Psychiatric: Her behavior is normal  Judgment and thought content normal    Mood depressed   Nursing note and vitals reviewed

## 2018-04-27 ENCOUNTER — TELEPHONE (OUTPATIENT)
Dept: FAMILY MEDICINE CLINIC | Facility: CLINIC | Age: 28
End: 2018-04-27

## 2018-04-27 NOTE — TELEPHONE ENCOUNTER
Spoke with pt  Informed her the paperwork that had been left here for intermittent leave from work has been completed and a copy is ready to be picked up and/or faxed  I explained a $15 form fee is due  Pt acknowledged and stated she will  the form next week

## 2018-05-07 ENCOUNTER — ANESTHESIA EVENT (OUTPATIENT)
Dept: GASTROENTEROLOGY | Facility: MEDICAL CENTER | Age: 28
End: 2018-05-07
Payer: COMMERCIAL

## 2018-05-08 ENCOUNTER — ANESTHESIA (OUTPATIENT)
Dept: GASTROENTEROLOGY | Facility: MEDICAL CENTER | Age: 28
End: 2018-05-08
Payer: COMMERCIAL

## 2018-05-08 ENCOUNTER — HOSPITAL ENCOUNTER (OUTPATIENT)
Facility: MEDICAL CENTER | Age: 28
Setting detail: OUTPATIENT SURGERY
Discharge: HOME | End: 2018-05-08
Attending: INTERNAL MEDICINE | Admitting: INTERNAL MEDICINE
Payer: COMMERCIAL

## 2018-05-08 VITALS
OXYGEN SATURATION: 97 % | TEMPERATURE: 98 F | SYSTOLIC BLOOD PRESSURE: 95 MMHG | HEIGHT: 63 IN | DIASTOLIC BLOOD PRESSURE: 59 MMHG | BODY MASS INDEX: 28.17 KG/M2 | RESPIRATION RATE: 16 BRPM | WEIGHT: 159 LBS | HEART RATE: 81 BPM

## 2018-05-08 DIAGNOSIS — R19.8 ALTERNATING CONSTIPATION AND DIARRHEA: ICD-10-CM

## 2018-05-08 DIAGNOSIS — R19.8 ABNORMAL BOWEL HABITS: ICD-10-CM

## 2018-05-08 LAB — EXT PREGNANCY TEST URINE: NEGATIVE

## 2018-05-08 PROCEDURE — 88305 TISSUE EXAM BY PATHOLOGIST: CPT | Performed by: PATHOLOGY

## 2018-05-08 PROCEDURE — 45380 COLONOSCOPY AND BIOPSY: CPT | Performed by: INTERNAL MEDICINE

## 2018-05-08 PROCEDURE — 81025 URINE PREGNANCY TEST: CPT | Performed by: ANESTHESIOLOGY

## 2018-05-08 RX ORDER — SODIUM CHLORIDE 9 MG/ML
125 INJECTION, SOLUTION INTRAVENOUS CONTINUOUS
Status: DISCONTINUED | OUTPATIENT
Start: 2018-05-08 | End: 2018-05-08 | Stop reason: HOSPADM

## 2018-05-08 RX ORDER — PROPOFOL 10 MG/ML
INJECTION, EMULSION INTRAVENOUS AS NEEDED
Status: DISCONTINUED | OUTPATIENT
Start: 2018-05-08 | End: 2018-05-08 | Stop reason: SURG

## 2018-05-08 RX ORDER — DICYCLOMINE HCL 20 MG
20 TABLET ORAL EVERY 6 HOURS
Qty: 360 TABLET | Refills: 3 | Status: SHIPPED | OUTPATIENT
Start: 2018-05-08 | End: 2018-08-20

## 2018-05-08 RX ADMIN — SODIUM CHLORIDE 125 ML/HR: 0.9 INJECTION, SOLUTION INTRAVENOUS at 13:58

## 2018-05-08 RX ADMIN — PROPOFOL 50 MG: 10 INJECTION, EMULSION INTRAVENOUS at 14:44

## 2018-05-08 RX ADMIN — PROPOFOL 150 MG: 10 INJECTION, EMULSION INTRAVENOUS at 14:43

## 2018-05-08 RX ADMIN — PROPOFOL 50 MG: 10 INJECTION, EMULSION INTRAVENOUS at 14:46

## 2018-05-08 RX ADMIN — PROPOFOL 50 MG: 10 INJECTION, EMULSION INTRAVENOUS at 14:52

## 2018-05-08 RX ADMIN — PROPOFOL 50 MG: 10 INJECTION, EMULSION INTRAVENOUS at 14:56

## 2018-05-08 RX ADMIN — PROPOFOL 50 MG: 10 INJECTION, EMULSION INTRAVENOUS at 14:49

## 2018-05-08 NOTE — H&P (VIEW-ONLY)
[facility] - Outpatient Consultation  Mariluz Ferrer 33 y o  female MRN: [id number]  Encounter: [id number]      PCP: Arpita Felton DO  Referring: DO Mercedes Collins[address], [address] [place] [address] Blvd:      1  Alternating constipation and diarrhea  2  Abnormal bowel habits  3  Right lower quadrant abdominal pain  4  Abdominal bloating  5  Elevated C-reactive protein (CRP)  Symptoms of alternating constipation and diarrhea, abdominal bloating and abdominal pain most consistent with functional abdominal pain  Recent laboratory examination reviewed at [facility] done which demonstrates elevated C reactive protein and sed rate  This is difficult to interpret in the setting of her rheumatological diseases including ankylosing spondylitis  However the presence of ankylosing spondylitis with her increased risk for inflammatory bowel disease  Will obtain fecal calprotectin to evaluate for inflammatory conditions of the colon  Will plan for colonoscopy to evaluate  In the interim she is recommended to try fiber supplementation with Metamucil, she should titrate the dose to effect  Additionally during her diarrhea cycles, she should start imodium as needed  - Ambulatory referral to Gastroenterology  - Case request operating room: COLONOSCOPY; Standing  - Case request operating room: COLONOSCOPY  - Calprotectin,Fecal; Future  - Na Sulfate-K Sulfate-Mg Sulf (SUPREP BOWEL PREP KIT) 17 5-3 13-1 6 GM/180ML SOLN; Take 177 mL by mouth once for 1 dose  Dispense: 2 Bottle; Refill: 0        ______________________________________________________________________    HPI:      Patient is 44-year-old female referred to me for abnormal bowel habits, abdominal pain  She has a past medical history of fibromyalgia, ankylosing spondylitis, chronic migraine, anxiety disorder  She relates cycles of alternating diarrhea and constipation    Her diarrhea cycles last for up to two weeks at a time, and associated with postprandial fecal urgency resulting in passage of several watery/loose stools per day  She ends up having 4-5 bowel movements in one day  She avoids eating secondary to these diarrhea symptoms as this is only thing that improves her diarrhea  These cycles alternate with constipation, for which she will not move her bowels for up to one week at a time  Both cycles are associated with increasing generalized abdominal pain, which is worse in the right lower quadrant  Abdominal pain does improve with bowel movements  She has noted no hematochezia, melena  She does relate several weight fluctuations with weight loss of to 20-25 lb associated with diarrhea and loss of appetite  She regains this we quickly during her constipation cycles  She has last seen by GI at this outside clinic, approximately five years ago where she was diagnosed with irritable bowel syndrome  She was prescribed Linzess, she is unclear of the dose, however this resulted in severe diarrhea and she stopped this  She has also attempted Imodium, one pill daily, which resulted in constipation  She has a maternal aunt with ulcerative colitis  REVIEW OF SYSTEMS:    CONSTITUTIONAL: Denies any fever, chills, rigors, and weight loss  HEENT: No earache or tinnitus  Denies hearing loss or visual disturbances  CARDIOVASCULAR: No chest pain or palpitations  RESPIRATORY: Denies any cough, hemoptysis, shortness of breath or dyspnea on exertion  GASTROINTESTINAL: As noted in the History of Present Illness  GENITOURINARY: No problems with urination  Denies any hematuria or dysuria  NEUROLOGIC: No dizziness or vertigo, denies headaches  MUSCULOSKELETAL: Denies any muscle or joint pain  SKIN: Denies skin rashes or itching  ENDOCRINE: Denies excessive thirst  Denies intolerance to heat or cold  PSYCHOSOCIAL: Denies depression or anxiety  Denies any recent memory loss         Historical Information   Past Medical History:   Diagnosis Date    Fibromyalgia, primary     Migraine      Past Surgical History:   Procedure Laterality Date    TONSILLECTOMY      WISDOM TOOTH EXTRACTION       Social History   History   Alcohol Use No     History   Drug Use No     History   Smoking Status    Never Smoker   Smokeless Tobacco    Never Used     Family History   Problem Relation Age of Onset    Rheum arthritis Mother     Psoriasis Mother     Other Mother     Hypertension Mother     Diabetes unspecified Mother        Meds/Allergies       Current Outpatient Prescriptions:     celecoxib (CeleBREX) 200 mg capsule    Cholecalciferol (VITAMIN D) 2000 units CAPS    Cholecalciferol (VITAMIN D3) 70876 units CAPS    clonazePAM (KlonoPIN) 1 mg tablet    etonogestrel-ethinyl estradiol (NUVARING) 0 12-0 015 MG/24HR vaginal ring    gabapentin (NEURONTIN) 300 mg capsule    LORazepam (ATIVAN) 0 5 mg tablet    meloxicam (MOBIC) 15 mg tablet    methocarbamol (ROBAXIN) 500 mg tablet    montelukast (SINGULAIR) 10 mg tablet    naproxen (NAPROSYN) 500 mg tablet    onabotulinumtoxin A (BOTOX) 100 units    prochlorperazine (COMPAZINE) 10 mg tablet    propranolol (INDERAL LA) 60 mg 24 hr capsule    sertraline (ZOLOFT) 100 mg tablet    sulfaSALAzine (AZULFIDINE) 500 mg tablet    SUMAtriptan (IMITREX) 100 mg tablet    topiramate (TOPAMAX) 50 MG tablet    Na Sulfate-K Sulfate-Mg Sulf (SUPREP BOWEL PREP KIT) 17 5-3 13-1 6 GM/180ML SOLN    Current Facility-Administered Medications:     cyanocobalamin injection 1,000 mcg, 1,000 mcg, Intramuscular, Q30 Days, 1,000 mcg at 04/05/18 1507    promethazine (PHENERGAN) injection 25 mg, 25 mg, Intramuscular, Once    Allergies   Allergen Reactions    Depakote Er  [Valproic Acid]      Other reaction(s): dilated pupils, "schizi"    Desvenlafaxine      Other reaction(s): state of confusion           Objective     Blood pressure 108/60, pulse 79, temperature 97 6 °F (36 4 °C), temperature source Tympanic, height 5' 3" (1 6 m), weight 72 6 kg (160 lb)  Body mass index is 28 34 kg/m²  PHYSICAL EXAM:      General Appearance:   Alert, cooperative, no distress   HEENT:   Normocephalic, atraumatic, anicteric      Neck:  Supple, symmetrical, trachea midline   Lungs:   Clear to auscultation bilaterally; no rales, rhonchi or wheezing; respirations unlabored    Heart[de-identified]   Regular rate and rhythm; no murmur, rub, or gallop  Abdomen:   Soft, non-tender, non-distended; normal bowel sounds; no masses, no organomegaly    Genitalia:   Deferred    Rectal:   Deferred    Extremities:  No cyanosis, clubbing or edema    Pulses:  2+ and symmetric    Skin:  No jaundice, rashes, or lesions    Lymph nodes:  No palpable cervical lymphadenopathy        Lab Results:     Lab Results   Component Value Date    WBC 7 07 02/05/2018    HGB 12 9 02/05/2018    HCT 37 7 02/05/2018    MCV 87 02/05/2018     02/05/2018       Lab Results   Component Value Date     02/05/2018    K 4 3 02/05/2018     02/05/2018    CO2 23 02/05/2018    ANIONGAP 11 02/05/2018    BUN 9 02/05/2018    CREATININE 0 72 02/05/2018    GLUCOSE 94 02/05/2018    CALCIUM 9 1 02/05/2018    AST 19 02/05/2018    ALT 20 02/05/2018    ALKPHOS 50 02/05/2018    PROT 7 3 02/05/2018    BILITOT 0 33 02/05/2018    EGFR 116 02/05/2018       No results found for: INR, PROTIME      Radiology Results:   No results found

## 2018-05-08 NOTE — OP NOTE
**** GI/ENDOSCOPY REPORT ****     PATIENT NAME: Lissy Peña ------ VISIT ID:  Patient ID:   [id number] YOB: 1990     INTRODUCTION: Colonoscopy - A 33 female patient presents for an outpatient   Colonoscopy at [address] [place] [address]  PREVIOUS COLONOSCOPY: No prior colonoscopy  INDICATIONS: Change in bowel habits  Pain centered in the right lower   quadrant of the abdomen  CONSENT:  The benefits, risks, and alternatives to the procedure were   discussed and informed consent was obtained from the patient  PREPARATION: EKG, pulse, pulse oximetry and blood pressure were monitored   throughout the procedure  The patient was identified by myself both   verbally and by visual inspection of ID band  Airway Assessment   Classification: Airway class 2 - Visualization of the soft palate, fauces   and uvula  ASA Classification: See anesthesia record  MEDICATIONS: Anesthesia-check records     PROCEDURE:  The endoscope was passed without difficulty through the anus   under direct visualization and advanced to the cecum, confirmed by   appendiceal orifice and ileocecal valve  The scope was withdrawn and the   mucosa was carefully examined  The quality of the preparation was  Cecal   Intubation Time: Minute(s) Scope Withdrawal Time: Minute(s)     RECTAL EXAM: Normal rectal exam      FINDINGS:  The colon appeared to be normal  A biopsy was taken from the   whole colon  COMPLICATIONS: There were no complications  IMPRESSIONS: Normal colon  Biopsy taken  RECOMMENDATIONS: Follow-up on the results of the biopsy specimens  Resume   regular diet as tolerated  Start bentyl, sent to pharmacy  Follow-up   appointment with endoscopist      ESTIMATED BLOOD LOSS:     PATHOLOGY SPECIMENS: Random biopsy taken from the whole colon       PROCEDURE CODES:     ICD-9 Codes: 787 99 Other symptoms involving digestive system 789 03   Abdominal pain, right lower quadrant ICD-10 Codes: R19 4 Change in bowel habit R10 31 Right lower quadrant pain     PERFORMED BY: CHIQUITA Brito  on 05/08/2018  Version 1, electronically signed by CHIQUITA Brito  on 05/08/2018   at 15:04

## 2018-05-08 NOTE — DISCHARGE INSTRUCTIONS
Colonoscopy   WHAT YOU NEED TO KNOW:   A colonoscopy is a procedure to examine the inside of your colon (intestine) with a scope  Polyps or tissue growths may have been removed during your colonoscopy  It is normal to feel bloated and to have some abdominal discomfort  You should be passing gas  If you have hemorrhoids or you had polyps removed, you may have a small amount of bleeding  DISCHARGE INSTRUCTIONS:   Seek care immediately if:   · You have a large amount of bright red blood in your bowel movements  · Your abdomen is hard and firm and you have severe pain  · You have sudden trouble breathing  Contact your healthcare provider if:   · You develop a rash or hives  · You have a fever within 24 hours of your procedure  · You have not had a bowel movement for 3 days after your procedure  · You have questions or concerns about your condition or care  Activity:   · Do not lift, strain, or run  for 3 days after your procedure  · Rest after your procedure  You have been given medicine to relax you  Do not  drive or make important decisions until the day after your procedure  Return to your normal activity as directed  · Relieve gas and discomfort from bloating  by lying on your right side with a heating pad on your abdomen  You may need to take short walks to help the gas move out  Eat small meals until bloating is relieved  If you had polyps removed: For 7 days after your procedure:  · Do not  take aspirin  · Do not  go on long car rides  Help prevent constipation:   · Eat a variety of healthy foods  Healthy foods include fruit, vegetables, whole-grain breads, low-fat dairy products, beans, lean meat, and fish  Ask if you need to be on a special diet  Your healthcare provider may recommend that you eat high-fiber foods such as cooked beans  Fiber helps you have regular bowel movements  · Drink liquids as directed    Adults should drink between 9 and 13 eight-ounce cups of liquid every day  Ask what amount is best for you  For most people, good liquids to drink are water, juice, and milk  · Exercise as directed  Talk to your healthcare provider about the best exercise plan for you  Exercise can help prevent constipation, decrease your blood pressure and improve your health  Follow up with your healthcare provider as directed:  Write down your questions so you remember to ask them during your visits  © 2017 [address] [employer] Information is for End User's use only and may not be sold, redistributed or otherwise used for commercial purposes  All illustrations and images included in CareNotes® are the copyrighted property of [employer]  or [address]  The above information is an  only  It is not intended as medical advice for individual conditions or treatments  Talk to your doctor, nurse or pharmacist before following any medical regimen to see if it is safe and effective for you

## 2018-05-08 NOTE — ANESTHESIA PREPROCEDURE EVALUATION
Review of Systems/Medical History          Cardiovascular  Negative cardio ROS Hyperlipidemia,    Pulmonary  Negative pulmonary ROS        GI/Hepatic  Negative GI/hepatic ROS   Bowel prep       Negative  ROS        Endo/Other  Negative endo/other ROS      GYN  Negative gynecology ROS          Hematology   Musculoskeletal    Comment: Ankylosing Spondylitis      Neurology  Negative neurology ROS   Headaches, Fibromyalgia  Comment: Migraines last occurred two weeks ago Psychology   Depression , being treated for depression,              Physical Exam    Airway    Mallampati score: II  TM Distance: >3 FB  Neck ROM: full     Dental   No notable dental hx     Cardiovascular  Comment: Negative ROS, Rhythm: regular, Rate: normal, Cardiovascular exam normal    Pulmonary  Pulmonary exam normal Breath sounds clear to auscultation,     Other Findings        Anesthesia Plan  ASA Score- 2     Anesthesia Type- IV sedation with anesthesia with ASA Monitors  Additional Monitors:   Airway Plan:         Plan Factors- Patient instructed to abstain from smoking on day of procedure       Induction- intravenous  Postoperative Plan-     Informed Consent- Anesthetic plan and risks discussed with patient

## 2018-05-14 ENCOUNTER — TELEPHONE (OUTPATIENT)
Dept: NEUROLOGY | Facility: CLINIC | Age: 28
End: 2018-05-14

## 2018-05-14 NOTE — TELEPHONE ENCOUNTER
Message     Botox - no authorization is needed, please use our stock      Referral   Referral # [id number]   Referral Information     Referral # Creation Date Referral Status Status Update   [id number] 05/11/2018 Authorized 05/11/2018: Status History   Status Reason Referral Type Referral Reasons Referral Class   Received Carrier Authorization none none Internal   To Specialty To Provider To Location/POS To Department   Neurology Escobra Hardy PA-C [id number]   To Vendor Referred By By Location/POS By Department   none none none [facility]   Priority Start Date Expiration Date Referral Entered By   Routine 06/14/2018 05/01/2019 Jamia Canales   Visits Requested Visits Authorized Visits Completed Visits Scheduled   1 1 0 1   Procedure Information     Procedure Modifiers Provider Requested Approved   67260 (CPT®) - MT CHEMODERVATE FACIAL/TRIGEM/CERV MUSC MIGRAINE  [address] Dev[place], DO 1 1    - MT INJECTION,ONABOTULINUMTOXINA  Rosalia Vera, DO 1 1   Procedure Description   BOTOX INJECTION PG   Diagnosis Information     Diagnosis   G43 709 (ICD-10-CM) - Chronic migraine without aura, not intractable, without status migrainosus   Diagnosis Description   BOTOX APT/   Referral Notes   Number of Notes: 1   Type Date User Summary Attachment   General 05/11/2018 11:49 AM Jamia Canales care coordination -   Note    Botox - no authorization is needed, please use our stock

## 2018-05-17 DIAGNOSIS — F41.0 PANIC ATTACKS: Primary | ICD-10-CM

## 2018-05-17 DIAGNOSIS — R51.9 CHRONIC NONINTRACTABLE HEADACHE, UNSPECIFIED HEADACHE TYPE: ICD-10-CM

## 2018-05-17 DIAGNOSIS — G89.29 CHRONIC NONINTRACTABLE HEADACHE, UNSPECIFIED HEADACHE TYPE: ICD-10-CM

## 2018-05-17 RX ORDER — CLONAZEPAM 1 MG/1
1 TABLET ORAL DAILY
Qty: 60 TABLET | Refills: 0 | Status: SHIPPED | OUTPATIENT
Start: 2018-05-17 | End: 2018-07-06 | Stop reason: SDUPTHER

## 2018-05-17 RX ORDER — PROPRANOLOL HCL 60 MG
60 CAPSULE, EXTENDED RELEASE 24HR ORAL DAILY
Qty: 90 CAPSULE | Refills: 0 | Status: SHIPPED | OUTPATIENT
Start: 2018-05-17 | End: 2018-05-24

## 2018-05-17 NOTE — TELEPHONE ENCOUNTER
[address] called today for refill of patients' medication of Clonazepam 1 mg tab and Propranolol 60 mg  24 hr cap

## 2018-05-24 ENCOUNTER — OFFICE VISIT (OUTPATIENT)
Dept: FAMILY MEDICINE CLINIC | Facility: CLINIC | Age: 28
End: 2018-05-24
Payer: COMMERCIAL

## 2018-05-24 VITALS
BODY MASS INDEX: 27.11 KG/M2 | TEMPERATURE: 98.7 F | WEIGHT: 153 LBS | DIASTOLIC BLOOD PRESSURE: 64 MMHG | HEIGHT: 63 IN | SYSTOLIC BLOOD PRESSURE: 98 MMHG

## 2018-05-24 DIAGNOSIS — F32.0 CURRENT MILD EPISODE OF MAJOR DEPRESSIVE DISORDER WITHOUT PRIOR EPISODE (HCC): Primary | ICD-10-CM

## 2018-05-24 DIAGNOSIS — R53.82 CHRONIC FATIGUE: ICD-10-CM

## 2018-05-24 DIAGNOSIS — F32.0 MILD SINGLE CURRENT EPISODE OF MAJOR DEPRESSIVE DISORDER (HCC): ICD-10-CM

## 2018-05-24 PROCEDURE — 99213 OFFICE O/P EST LOW 20 MIN: CPT | Performed by: FAMILY MEDICINE

## 2018-05-24 RX ORDER — SERTRALINE HYDROCHLORIDE 100 MG/1
100 TABLET, FILM COATED ORAL 2 TIMES DAILY
Qty: 180 TABLET | Refills: 1 | Status: SHIPPED | OUTPATIENT
Start: 2018-05-24 | End: 2018-10-19 | Stop reason: SDUPTHER

## 2018-05-24 NOTE — PROGRESS NOTES
Assessment/Plan:  Patient will stop propranolol due to decreased blood pressure and fatigue  Patient will wean off propranolol  Refill given       Diagnoses and all orders for this visit:    Current mild episode of major depressive disorder without prior episode ([facility] [address] )    Mild single current episode of major depressive disorder ([facility])  -     sertraline (ZOLOFT) 100 mg tablet; Take 1 tablet (100 mg total) by mouth 2 (two) times a day    Chronic fatigue          Subjective:      Patient ID: Violette Carlson is a 33 y o  female  Patient is here with ongoing fatigue  Patient will sometimes feel like she needs to sleep after taking a shower or feeling like she may pass out  No syncope  The following portions of the patient's history were reviewed and updated as appropriate: allergies, current medications, past family history, past medical history, past social history, past surgical history and problem list     Review of Systems   Constitutional: Positive for fatigue  HENT: Negative  Eyes: Negative  Respiratory: Negative  Cardiovascular: Negative  Gastrointestinal: Negative  Endocrine: Negative  Genitourinary: Negative  Musculoskeletal: Negative  Skin: Negative  Allergic/Immunologic: Negative  Neurological: Negative  Hematological: Negative  Psychiatric/Behavioral: Negative  Objective:      BP 98/64 (BP Location: Right arm, Patient Position: Sitting, Cuff Size: Standard)   Temp 98 7 °F (37 1 °C) (Tympanic)   Ht 5' 3" (1 6 m)   Wt 69 4 kg (153 lb)   LMP 04/25/2018   BMI 27 10 kg/m²          Physical Exam   Constitutional: She is oriented to person, place, and time  She appears well-developed and well-nourished  No distress  HENT:   Head: Normocephalic  Right Ear: External ear normal    Left Ear: External ear normal    Mouth/Throat: Oropharynx is clear and moist  No oropharyngeal exudate     Eyes: EOM are normal  Pupils are equal, round, and reactive to light  Right eye exhibits no discharge  Left eye exhibits no discharge  No scleral icterus  Neck: Normal range of motion  Neck supple  No thyromegaly present  Cardiovascular: Normal rate, regular rhythm, normal heart sounds and intact distal pulses  Exam reveals no gallop and no friction rub  No murmur heard  Pulmonary/Chest: Effort normal and breath sounds normal  No respiratory distress  She has no wheezes  She has no rales  She exhibits no tenderness  Abdominal: Soft  Bowel sounds are normal  She exhibits no distension  There is no tenderness  There is no rebound and no guarding  Musculoskeletal: Normal range of motion  She exhibits no edema or tenderness  Lymphadenopathy:     She has no cervical adenopathy  Neurological: She is oriented to person, place, and time  No cranial nerve deficit  She exhibits normal muscle tone  Coordination normal    Skin: Skin is warm and dry  No rash noted  She is not diaphoretic  No erythema  No pallor  Psychiatric: She has a normal mood and affect  Her behavior is normal  Judgment and thought content normal    Nursing note and vitals reviewed

## 2018-05-29 ENCOUNTER — TELEPHONE (OUTPATIENT)
Dept: FAMILY MEDICINE CLINIC | Facility: CLINIC | Age: 28
End: 2018-05-29

## 2018-05-29 NOTE — TELEPHONE ENCOUNTER
Spoke with pt to notify her the FMLA/Disability form has been completed and signed  The form has been faxed and a copy is ready to be picked up at her earliest convenience

## 2018-06-04 ENCOUNTER — OFFICE VISIT (OUTPATIENT)
Dept: FAMILY MEDICINE CLINIC | Facility: CLINIC | Age: 28
End: 2018-06-04
Payer: COMMERCIAL

## 2018-06-04 VITALS
WEIGHT: 157.6 LBS | SYSTOLIC BLOOD PRESSURE: 120 MMHG | TEMPERATURE: 98.5 F | HEIGHT: 63 IN | DIASTOLIC BLOOD PRESSURE: 90 MMHG | BODY MASS INDEX: 27.93 KG/M2

## 2018-06-04 DIAGNOSIS — G43.709 CHRONIC MIGRAINE WITHOUT AURA WITHOUT STATUS MIGRAINOSUS, NOT INTRACTABLE: Primary | ICD-10-CM

## 2018-06-04 PROCEDURE — 99213 OFFICE O/P EST LOW 20 MIN: CPT | Performed by: FAMILY MEDICINE

## 2018-06-04 RX ORDER — KETOROLAC TROMETHAMINE 30 MG/ML
60 INJECTION, SOLUTION INTRAMUSCULAR; INTRAVENOUS ONCE
Status: COMPLETED | OUTPATIENT
Start: 2018-06-04 | End: 2018-06-04

## 2018-06-04 RX ORDER — PROMETHAZINE HYDROCHLORIDE 25 MG/ML
25 INJECTION, SOLUTION INTRAMUSCULAR; INTRAVENOUS EVERY 6 HOURS PRN
Status: DISCONTINUED | OUTPATIENT
Start: 2018-06-04 | End: 2018-11-19

## 2018-06-04 RX ADMIN — KETOROLAC TROMETHAMINE 60 MG: 30 INJECTION, SOLUTION INTRAMUSCULAR; INTRAVENOUS at 15:16

## 2018-06-04 RX ADMIN — PROMETHAZINE HYDROCHLORIDE 25 MG: 25 INJECTION, SOLUTION INTRAMUSCULAR; INTRAVENOUS at 15:15

## 2018-06-04 NOTE — PROGRESS NOTES
Assessment/Plan:         Diagnoses and all orders for this visit:    Chronic migraine without aura without status migrainosus, not intractable  -     ketorolac (TORADOL) 60 mg/2 mL IM injection 60 mg; Inject 2 mL (60 mg total) into the shoulder, thigh, or buttocks once   -     promethazine (PHENERGAN) injection 25 mg; Inject 1 mL (25 mg total) into the shoulder, thigh, or buttocks every 6 (six) hours as needed for nausea or vomiting           Subjective:      Patient ID: Kathe Kenyon is a 33 y o  female  Patient is here for migraine since Saturday  Patient wishes injection  Patient with light sensitivity and nausea associated with it  Patient using routine regimen        The following portions of the patient's history were reviewed and updated as appropriate: allergies, current medications, past family history, past medical history, past social history, past surgical history and problem list     Review of Systems   Constitutional: Negative  HENT: Negative  Eyes: Negative  Respiratory: Negative  Cardiovascular: Negative  Gastrointestinal: Negative  Endocrine: Negative  Genitourinary: Negative  Musculoskeletal: Negative  Skin: Negative  Allergic/Immunologic: Negative  Neurological: Positive for headaches  Hematological: Negative  Psychiatric/Behavioral: Negative  Objective:      /90 (BP Location: Right arm, Patient Position: Sitting, Cuff Size: Standard)   Temp 98 5 °F (36 9 °C) (Tympanic)   Ht 5' 3" (1 6 m)   Wt 71 5 kg (157 lb 9 6 oz)   BMI 27 92 kg/m²          Physical Exam   Constitutional: She is oriented to person, place, and time  She appears well-developed and well-nourished  No distress  HENT:   Head: Normocephalic  Right Ear: External ear normal    Left Ear: External ear normal    Mouth/Throat: Oropharynx is clear and moist  No oropharyngeal exudate  Eyes: EOM are normal  Pupils are equal, round, and reactive to light   Right eye exhibits no discharge  Left eye exhibits no discharge  No scleral icterus  Neck: Normal range of motion  Neck supple  No thyromegaly present  Cardiovascular: Normal rate, regular rhythm, normal heart sounds and intact distal pulses  Exam reveals no gallop and no friction rub  No murmur heard  Pulmonary/Chest: Effort normal and breath sounds normal  No respiratory distress  She has no wheezes  She has no rales  She exhibits no tenderness  Abdominal: Soft  Bowel sounds are normal  She exhibits no distension  There is no tenderness  There is no rebound and no guarding  Musculoskeletal: Normal range of motion  She exhibits tenderness  She exhibits no edema  Lymphadenopathy:     She has no cervical adenopathy  Neurological: She is oriented to person, place, and time  No cranial nerve deficit  She exhibits normal muscle tone  Coordination normal    Skin: Skin is warm and dry  No rash noted  She is not diaphoretic  No erythema  No pallor  Psychiatric: She has a normal mood and affect  Her behavior is normal  Judgment and thought content normal    Nursing note and vitals reviewed

## 2018-06-06 DIAGNOSIS — Z30.44 ENCOUNTER FOR SURVEILLANCE OF VAGINAL RING HORMONAL CONTRACEPTIVE DEVICE: Primary | ICD-10-CM

## 2018-06-06 DIAGNOSIS — Z30.011 ENCOUNTER FOR BCP (BIRTH CONTROL PILLS) INITIAL PRESCRIPTION: ICD-10-CM

## 2018-06-06 RX ORDER — ETONOGESTREL AND ETHINYL ESTRADIOL 11.7; 2.7 MG/1; MG/1
INSERT, EXTENDED RELEASE VAGINAL
Qty: 1 EACH | Refills: 0 | Status: SHIPPED | OUTPATIENT
Start: 2018-06-06 | End: 2018-06-28 | Stop reason: SDUPTHER

## 2018-06-14 ENCOUNTER — PROCEDURE VISIT (OUTPATIENT)
Dept: NEUROLOGY | Facility: CLINIC | Age: 28
End: 2018-06-14
Payer: COMMERCIAL

## 2018-06-14 ENCOUNTER — TELEPHONE (OUTPATIENT)
Dept: PAIN MEDICINE | Facility: MEDICAL CENTER | Age: 28
End: 2018-06-14

## 2018-06-14 ENCOUNTER — TELEPHONE (OUTPATIENT)
Dept: NEUROLOGY | Facility: CLINIC | Age: 28
End: 2018-06-14

## 2018-06-14 VITALS — TEMPERATURE: 98.1 F | DIASTOLIC BLOOD PRESSURE: 64 MMHG | SYSTOLIC BLOOD PRESSURE: 102 MMHG

## 2018-06-14 DIAGNOSIS — G43.709 CHRONIC MIGRAINE WITHOUT AURA WITHOUT STATUS MIGRAINOSUS, NOT INTRACTABLE: Primary | ICD-10-CM

## 2018-06-14 PROCEDURE — 64615 CHEMODENERV MUSC MIGRAINE: CPT | Performed by: PHYSICIAN ASSISTANT

## 2018-06-14 NOTE — TELEPHONE ENCOUNTER
Patient called and LM on [employer] @ [address] stating she needs a refill of Gabapentin sent to her pharmacy   C/b [phone]

## 2018-06-14 NOTE — TELEPHONE ENCOUNTER
Pharmacy states PA is needed for toradol injection  PA initiated on CMM      [facility]: [phone]  ID: [id number]

## 2018-06-14 NOTE — PROGRESS NOTES
Procedures    Vitals:    06/14/18 1314   BP: 102/64   Temp: 98 1 °F (36 7 °C)   TempSrc: Oral       Chemodenervation  Date/Time: 3/13/2018 12:44 PM  Performed by: Michael Cloud  Authorized by: Michael Cloud     Pre-procedure details:     Prepped With: Alcohol    Procedure details:     Position:  Upright  Botox:     Botox Type:  Type A    Brand:  Botox    mL's of Botulinum Toxin:  155    Final Concentration per CC:  50 units    Needle Gauge:  30 G 2 5 inch  Procedures:     Botox Procedures: chronic headache      Botox Procedures comment:  Chronic migraine    Indications: migraines      Indications comment:  Chronic migraine    Date of last injection:  11/28/2017  Injection Location:     Head / Face:  L , R , L frontalis, R frontalis, R inferior cervical paraspinal, L inferior cervical paraspinal, L medial occipitalis, R medial occipitalis, L lateral occipitalis, R lateral occipitalis, procerus, L temporalis, R temporalis    L  injection amount:  5 unit(s)    R  injection amount:  5 unit(s)    L lateral frontalis:  5 unit(s)    R lateral frontalis:  5 unit(s)    L medial frontalis:  5 unit(s)    R medial frontalis:  5 unit(s)    L temporalis injection amount:  20 unit(s)    R temporalis injection amount:  20 unit(s)    Procerus injection amount:  5 unit(s)    L lateral occipitalis injection amount:  5 unit(s)    R lateral occipitalis injection amount:  5 unit(s)    L medial occipitalis injection amount:  10 unit(s)    R medial occipitalis injection amount:  10 unit(s)    L inferior cervical paraspinal injection amount:  10 unit(s)    R inferior cervical paraspinal injection amount:  10 unit(s)  Extra 30 units spread throughout the scalp and temporal regions bilaterally, medically necessary  Total Units:     Total units used:  155    Total units discarded:  45  Post-procedure details:     Chemodenervation:  Chronic migraine    Patient tolerance of procedure:   Tolerated well, no immediate complications

## 2018-06-14 NOTE — TELEPHONE ENCOUNTER
RN attempted to reach pt regarding previous  [facility] with c/b number office hours and location provided  --pt last seen on 1/29, gabapentin written for with 5 refills  Current dose? -

## 2018-06-15 ENCOUNTER — OFFICE VISIT (OUTPATIENT)
Dept: FAMILY MEDICINE CLINIC | Facility: CLINIC | Age: 28
End: 2018-06-15
Payer: COMMERCIAL

## 2018-06-15 VITALS
BODY MASS INDEX: 27.46 KG/M2 | WEIGHT: 155 LBS | SYSTOLIC BLOOD PRESSURE: 116 MMHG | HEIGHT: 63 IN | DIASTOLIC BLOOD PRESSURE: 78 MMHG | TEMPERATURE: 99.5 F

## 2018-06-15 DIAGNOSIS — R41.3 MEMORY LOSS: ICD-10-CM

## 2018-06-15 DIAGNOSIS — IMO0002 CHRONIC MIGRAINE: Primary | ICD-10-CM

## 2018-06-15 DIAGNOSIS — E55.9 VITAMIN D DEFICIENCY: ICD-10-CM

## 2018-06-15 DIAGNOSIS — E53.8 VITAMIN B12 DEFICIENCY: ICD-10-CM

## 2018-06-15 PROCEDURE — 99214 OFFICE O/P EST MOD 30 MIN: CPT | Performed by: FAMILY MEDICINE

## 2018-06-15 RX ORDER — TOPIRAMATE 50 MG/1
100 TABLET, FILM COATED ORAL
Qty: 180 TABLET | Refills: 0 | Status: SHIPPED | OUTPATIENT
Start: 2018-06-15 | End: 2018-11-20

## 2018-06-15 RX ORDER — CELECOXIB 100 MG/1
100 CAPSULE ORAL 2 TIMES DAILY
COMMUNITY
End: 2018-11-13

## 2018-06-15 RX ORDER — GABAPENTIN 300 MG/1
300 CAPSULE ORAL
Qty: 90 CAPSULE | Refills: 0 | Status: SHIPPED | OUTPATIENT
Start: 2018-06-15 | End: 2018-08-20

## 2018-06-15 NOTE — PROGRESS NOTES
Assessment/Plan:   patient is following up with Rheumatology as well as Neurology  Patient will laboratory studies done  Patient will decrease Topamax 100 mg nightly and will decrease gabapentin 300 mg nightly  Follow-up in 3 weeks     Diagnoses and all orders for this visit:    Chronic migraine  -     CBC and differential; Future  -     Comprehensive metabolic panel; Future  -     TSH, 3rd generation with Free T4 reflex; Future  -     Lipid panel; Future  -     Vitamin B12; Future  -     Vitamin D 25 hydroxy; Future  -     C-reactive protein; Future  -     topiramate (TOPAMAX) 50 MG tablet; Take 2 tablets (100 mg total) by mouth daily at bedtime  -     gabapentin (NEURONTIN) 300 mg capsule; Take 1 capsule (300 mg total) by mouth daily at bedtime    Vitamin B12 deficiency  -     Vitamin B12; Future    Vitamin D deficiency  -     Vitamin D 25 hydroxy; Future    Memory loss    Other orders  -     celecoxib (CeleBREX) 100 mg capsule; Take 100 mg by mouth 2 (two) times a day          Subjective:      Patient ID: Mara Salgado is a 33 y o  female  Patient is here to follow-up on fatigue  Patient to see rheumatologist and sleep study ordered  Patient went for Botox history  Patient also following up on shaking  Patient having some cognitive issues  Patient does see Neurology  Patient does see them on June 27th  The following portions of the patient's history were reviewed and updated as appropriate: allergies, current medications, past family history, past medical history, past social history, past surgical history and problem list     Review of Systems   Constitutional: Positive for fatigue  HENT: Negative  Eyes: Negative  Respiratory: Negative  Cardiovascular: Negative  Gastrointestinal: Negative  Endocrine: Negative  Genitourinary: Negative  Musculoskeletal: Negative  Skin: Negative  Allergic/Immunologic: Negative  Neurological: Positive for tremors     Hematological: Negative  Psychiatric/Behavioral: Positive for confusion and decreased concentration  Objective:      /78 (BP Location: Right arm, Patient Position: Sitting, Cuff Size: Standard)   Temp 99 5 °F (37 5 °C) (Tympanic)   Ht 5' 3" (1 6 m)   Wt 70 3 kg (155 lb)   BMI 27 46 kg/m²          Physical Exam   Constitutional: She is oriented to person, place, and time  She appears well-developed and well-nourished  No distress  HENT:   Head: Normocephalic  Right Ear: External ear normal    Left Ear: External ear normal    Mouth/Throat: Oropharynx is clear and moist  No oropharyngeal exudate  Eyes: EOM are normal  Pupils are equal, round, and reactive to light  Right eye exhibits no discharge  Left eye exhibits no discharge  No scleral icterus  Neck: Normal range of motion  Neck supple  No thyromegaly present  Cardiovascular: Normal rate, regular rhythm, normal heart sounds and intact distal pulses  Exam reveals no gallop and no friction rub  No murmur heard  Pulmonary/Chest: Effort normal and breath sounds normal  No respiratory distress  She has no wheezes  She has no rales  She exhibits no tenderness  Abdominal: Soft  Bowel sounds are normal  She exhibits no distension  There is no tenderness  There is no rebound and no guarding  Musculoskeletal: Normal range of motion  She exhibits no edema or tenderness  Lymphadenopathy:     She has no cervical adenopathy  Neurological: She is oriented to person, place, and time  No cranial nerve deficit  She exhibits normal muscle tone  Coordination normal    Skin: Skin is warm and dry  No rash noted  She is not diaphoretic  No erythema  No pallor  Psychiatric: She has a normal mood and affect  Her behavior is normal  Judgment and thought content normal    Nursing note and vitals reviewed

## 2018-06-18 NOTE — TELEPHONE ENCOUNTER
PT did not return call to [address]  RN did notice that pt received refill of gabapentin per PCP on 6/15

## 2018-06-27 ENCOUNTER — OFFICE VISIT (OUTPATIENT)
Dept: NEUROLOGY | Facility: CLINIC | Age: 28
End: 2018-06-27
Payer: COMMERCIAL

## 2018-06-27 ENCOUNTER — TELEPHONE (OUTPATIENT)
Dept: NEUROLOGY | Facility: CLINIC | Age: 28
End: 2018-06-27

## 2018-06-27 ENCOUNTER — DOCUMENTATION (OUTPATIENT)
Dept: NEUROLOGY | Facility: CLINIC | Age: 28
End: 2018-06-27

## 2018-06-27 VITALS
SYSTOLIC BLOOD PRESSURE: 110 MMHG | DIASTOLIC BLOOD PRESSURE: 60 MMHG | BODY MASS INDEX: 27.46 KG/M2 | HEIGHT: 63 IN | WEIGHT: 155 LBS | HEART RATE: 76 BPM | RESPIRATION RATE: 14 BRPM

## 2018-06-27 DIAGNOSIS — R41.89 COGNITIVE DECLINE: ICD-10-CM

## 2018-06-27 DIAGNOSIS — R53.82 CHRONIC FATIGUE: ICD-10-CM

## 2018-06-27 DIAGNOSIS — IMO0002 CHRONIC MIGRAINE: Primary | ICD-10-CM

## 2018-06-27 DIAGNOSIS — G43.709 CHRONIC MIGRAINE WITHOUT AURA WITHOUT STATUS MIGRAINOSUS, NOT INTRACTABLE: Primary | ICD-10-CM

## 2018-06-27 DIAGNOSIS — G43.709 CHRONIC MIGRAINE WITHOUT AURA WITHOUT STATUS MIGRAINOSUS, NOT INTRACTABLE: ICD-10-CM

## 2018-06-27 PROCEDURE — 99213 OFFICE O/P EST LOW 20 MIN: CPT | Performed by: PHYSICIAN ASSISTANT

## 2018-06-27 RX ORDER — TOPIRAMATE 100 MG/1
100 CAPSULE, EXTENDED RELEASE ORAL
Qty: 30 CAPSULE | Refills: 5 | Status: SHIPPED | OUTPATIENT
Start: 2018-06-27 | End: 2018-07-06

## 2018-06-27 RX ORDER — SYRINGE W-NEEDLE,DISPOSAB,3 ML 25GX5/8"
SYRINGE, EMPTY DISPOSABLE MISCELLANEOUS
Qty: 2 EACH | Refills: 2 | Status: SHIPPED | OUTPATIENT
Start: 2018-06-27 | End: 2018-09-14 | Stop reason: SDUPTHER

## 2018-06-27 NOTE — TELEPHONE ENCOUNTER
Pharm called and states that they need a script for syringe and needle    Please enter script and send to pharm

## 2018-06-27 NOTE — PROGRESS NOTES
Patient ID: Jaret Fontana is a 33 y o  female  Assessment/Plan:    Cognitive decline  Probably largely related to Topamax side effects  Other contributing factors her likely fatigue secondary to comorbidities, and she is having a sleep study soon to rule out apnea  Problem List Items Addressed This Visit        Cardiovascular and Mediastinum    Chronic migraine without aura without status migrainosus, not intractable    Relevant Medications    TROKENDI  MG CP24    ketorolac (TORADOL) 60 MG/2ML SOLN    RESOLVED: Chronic migraine - Primary    Relevant Medications    TROKENDI  MG CP24    ketorolac (TORADOL) 60 MG/2ML SOLN       Other    Chronic fatigue    Cognitive decline     Probably largely related to Topamax side effects  Other contributing factors her likely fatigue secondary to comorbidities, and she is having a sleep study soon to rule out apnea  Continue gabapentin 300 mg q h s  Continue Topamax 100 mg q h s , until he she gets Trokendi XR  She agreed to a trial Trokendi XR in place a Topamax as this is an extended release medication which may cause fewer side effects  Her she is to stay on this medication for at least 2-3 weeks, and if cognitive issues do not less than a resolve she will call me and the plan will be to wean off of Topamax  She was encouraged to follow up with her ophthalmologist to rule out papilledema, with mild blurred margins on fundus exam today  For abortive treatment Imitrex at the onset of a migraine, then repeat after 2 hours if needed  At [facility] for nausea/vomiting  I provided 1 dose of ketorolac injection for home use so that she does not have to leave the home to go to PCP if in a migraine cycle  Subjective:    JOVANY Montenegro is a pleasant 32 yo female who presents for neurological follow-up for migraine headaches, and now new symptoms of cognitive decline      She is currently not working because of excessive daytime fatigue and cognitive decline  She wants to get back to work soon and try to figure out where her cognitive issues are coming from  She was recently diagnosed with ankylosing spondylitis and fibromyalgia, this has fatigue from these  Her migraines are improved with Botox, thankfully, and Topamax, however this is possibly causing memory loss and word-finding issues  She stops mid sentence because she does not remember which she is trying to say  She has word-finding issues at times, and significant short-term memory loss  Her mother is here today and adds some of this history  Her PCP recently decreased gabapentin and Topamax, as he thinks that cognitive decline is possibly from polypharmacy  The medications were decreased 6/15/2018 but she does not note any changes in her symptoms since then  She is afraid to wean off of Topamax since this may worsen her migraines, but she does think that it may cause cognitive issues  Since starting botox, the patient reports greater than 7 days of migraine relief from baseline, correlated with headache diary, decreased abortive medication use and decreased ER visits  When she gets a very bad migraine she has to go to her PCP for ketorolac injection, but this office is far from where she lives, as is our office, and going outside of the home with environmental stimuli typically worsens her migraines, thus she asked if she could have a ketorolac injection for home use  ---    The following portions of the patient's history were reviewed and updated as appropriate:   She  has a past medical history of Fibromyalgia, primary and Migraine    She   Patient Active Problem List    Diagnosis Date Noted    Cognitive decline 06/27/2018    Memory loss 06/15/2018    Chronic fatigue 05/24/2018    Major depressive disorder with single episode 04/24/2018    Alternating constipation and diarrhea 04/17/2018    Abnormal bowel habits 04/17/2018    Chronic migraine without aura without status migrainosus, not intractable 03/13/2018    Anterior chest wall pain 02/22/2018    Cervical radiculitis 06/10/2016    Seasonal allergies 03/24/2015    Lumbar radiculopathy 01/05/2015    Vitamin B12 deficiency 07/30/2014    Hyperlipidemia 03/24/2014    Vitamin D deficiency 02/26/2014    Anxiety disorder 11/19/2012     She  has a past surgical history that includes Tonsillectomy; [place] tooth extraction; and Colonoscopy (N/A, 5/8/2018)  Her family history includes Diabetes unspecified in her mother; Hypertension in her mother; Other in her mother; Psoriasis in her mother; Rheum arthritis in her mother  She  reports that she has never smoked  She has never used smokeless tobacco  She reports that she does not drink alcohol or use drugs  Current Outpatient Prescriptions   Medication Sig Dispense Refill    celecoxib (CeleBREX) 100 mg capsule Take 100 mg by mouth 2 (two) times a day      Cholecalciferol (VITAMIN D) 2000 units CAPS Take 1 capsule by mouth daily      clonazePAM (KlonoPIN) 1 mg tablet Take 1 tablet (1 mg total) by mouth daily 60 tablet 0    etonogestrel-ethinyl estradiol (NUVARING) 0 12-0 015 MG/24HR vaginal ring Insert ring for 21 days then remove for one week  1 each 0    gabapentin (NEURONTIN) 300 mg capsule Take 1 capsule (300 mg total) by mouth daily at bedtime (Patient taking differently: Take 600 mg by mouth daily at bedtime  ) 90 capsule 0    ketorolac (TORADOL) 60 MG/2ML SOLN 1-2 mL intramuscularly prn migraine onset   2 mL 1    naproxen (NAPROSYN) 500 mg tablet Take 1 tablet by mouth 2 (two) times a day      onabotulinumtoxin A (BOTOX) 100 units Inject as directed      prochlorperazine (COMPAZINE) 10 mg tablet Take by mouth      sertraline (ZOLOFT) 100 mg tablet Take 1 tablet (100 mg total) by mouth 2 (two) times a day 180 tablet 1    sulfaSALAzine (AZULFIDINE) 500 mg tablet Take by mouth      SUMAtriptan (IMITREX) 100 mg tablet Take 1 tablet (100 mg total) by mouth once as needed for migraine for up to 1 dose 10 tablet 1    topiramate (TOPAMAX) 50 MG tablet Take 2 tablets (100 mg total) by mouth daily at bedtime 180 tablet 0    dicyclomine (BENTYL) 20 mg tablet Take 1 tablet (20 mg total) by mouth every 6 (six) hours for 90 days 360 tablet 3    TROKENDI  MG CP24 Take 1 capsule (100 mg total) by mouth daily at bedtime 30 capsule 5     Current Facility-Administered Medications   Medication Dose Route Frequency Provider Last Rate Last Dose    cyanocobalamin injection 1,000 mcg  1,000 mcg Intramuscular Q30 Days Marchia Quarry, DO   1,000 mcg at 04/05/18 1507    promethazine (PHENERGAN) injection 25 mg  25 mg Intramuscular Once Marchia Quarry, DO        promethazine (PHENERGAN) injection 25 mg  25 mg Intramuscular Q6H PRN Marchia Quarry, DO   25 mg at 06/04/18 1515     Current Outpatient Prescriptions on File Prior to Visit   Medication Sig    celecoxib (CeleBREX) 100 mg capsule Take 100 mg by mouth 2 (two) times a day    Cholecalciferol (VITAMIN D) 2000 units CAPS Take 1 capsule by mouth daily    clonazePAM (KlonoPIN) 1 mg tablet Take 1 tablet (1 mg total) by mouth daily    etonogestrel-ethinyl estradiol (NUVARING) 0 12-0 015 MG/24HR vaginal ring Insert ring for 21 days then remove for one week      gabapentin (NEURONTIN) 300 mg capsule Take 1 capsule (300 mg total) by mouth daily at bedtime (Patient taking differently: Take 600 mg by mouth daily at bedtime  )    naproxen (NAPROSYN) 500 mg tablet Take 1 tablet by mouth 2 (two) times a day    onabotulinumtoxin A (BOTOX) 100 units Inject as directed    prochlorperazine (COMPAZINE) 10 mg tablet Take by mouth    sertraline (ZOLOFT) 100 mg tablet Take 1 tablet (100 mg total) by mouth 2 (two) times a day    sulfaSALAzine (AZULFIDINE) 500 mg tablet Take by mouth    SUMAtriptan (IMITREX) 100 mg tablet Take 1 tablet (100 mg total) by mouth once as needed for migraine for up to 1 dose    topiramate (TOPAMAX) 50 MG tablet Take 2 tablets (100 mg total) by mouth daily at bedtime    [DISCONTINUED] ketorolac (TORADOL) 60 MG/2ML SOLN 1-2 mL intramuscularly prn migraine onset   dicyclomine (BENTYL) 20 mg tablet Take 1 tablet (20 mg total) by mouth every 6 (six) hours for 90 days     Current Facility-Administered Medications on File Prior to Visit   Medication    cyanocobalamin injection 1,000 mcg    promethazine (PHENERGAN) injection 25 mg    promethazine (PHENERGAN) injection 25 mg     She is allergic to depakote er  [valproic acid] and desvenlafaxine            Objective:    Blood pressure 110/60, pulse 76, resp  rate 14, height 5' 3" (1 6 m), weight 70 3 kg (155 lb)  Physical Exam   Constitutional: She is oriented to person, place, and time  She appears well-developed and well-nourished  HENT:   Head: Normocephalic and atraumatic  Eyes:   Slightly blurred margins on gross funduscopic exam b/l  Neck: Normal range of motion  Neck supple  Musculoskeletal: Normal range of motion  Neurological: She is alert and oriented to person, place, and time  She displays normal reflexes  No cranial nerve deficit  Coordination normal    Intermittent word-finding issues, and decreased verbal fluency  No dysarthria  Reflexes are brisk throughout but not pathological   Normal gait is steady  Psychiatric: She has a normal mood and affect  Her behavior is normal  Judgment and thought content normal    Nursing note and vitals reviewed  Neurological Exam      ROS:    Review of Systems   Constitutional: Negative  HENT: Negative  Eyes: Negative  Respiratory: Negative  Cardiovascular: Negative  Gastrointestinal: Negative  Endocrine: Negative  Genitourinary: Negative  Musculoskeletal: Negative  Skin: Negative  Allergic/Immunologic: Negative  Neurological: Positive for speech difficulty and headaches  Hematological: Negative  Psychiatric/Behavioral: Positive for confusion       Review of systems, Past medical history, Surgical history, Family history, Social history and Medication history were reviewed and otherwise unremarkable from a neurological perspective

## 2018-06-27 NOTE — ASSESSMENT & PLAN NOTE
Probably largely related to Topamax side effects  Other contributing factors her likely fatigue secondary to comorbidities, and she is having a sleep study soon to rule out apnea

## 2018-06-28 ENCOUNTER — ANNUAL EXAM (OUTPATIENT)
Dept: OBGYN CLINIC | Facility: MEDICAL CENTER | Age: 28
End: 2018-06-28
Payer: COMMERCIAL

## 2018-06-28 VITALS
HEIGHT: 63 IN | WEIGHT: 153.8 LBS | DIASTOLIC BLOOD PRESSURE: 64 MMHG | BODY MASS INDEX: 27.25 KG/M2 | SYSTOLIC BLOOD PRESSURE: 100 MMHG

## 2018-06-28 DIAGNOSIS — Z30.44 ENCOUNTER FOR SURVEILLANCE OF VAGINAL RING HORMONAL CONTRACEPTIVE DEVICE: ICD-10-CM

## 2018-06-28 DIAGNOSIS — Z01.419 ENCNTR FOR GYN EXAM (GENERAL) (ROUTINE) W/O ABN FINDINGS: Primary | ICD-10-CM

## 2018-06-28 DIAGNOSIS — Z11.3 SCREENING FOR STD (SEXUALLY TRANSMITTED DISEASE): ICD-10-CM

## 2018-06-28 PROCEDURE — 99395 PREV VISIT EST AGE 18-39: CPT | Performed by: NURSE PRACTITIONER

## 2018-06-28 PROCEDURE — 87591 N.GONORRHOEAE DNA AMP PROB: CPT | Performed by: NURSE PRACTITIONER

## 2018-06-28 PROCEDURE — 87491 CHLMYD TRACH DNA AMP PROBE: CPT | Performed by: NURSE PRACTITIONER

## 2018-06-28 RX ORDER — ETONOGESTREL AND ETHINYL ESTRADIOL 11.7; 2.7 MG/1; MG/1
INSERT, EXTENDED RELEASE VAGINAL
Qty: 3 EACH | Refills: 3 | Status: SHIPPED | OUTPATIENT
Start: 2018-06-28 | End: 2019-01-28 | Stop reason: SDUPTHER

## 2018-06-28 NOTE — PROGRESS NOTES
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     1   Routine well woman exam done today  2  Pap and HPV:  The patient's last pap was 2017  It was normal     Pap was not done today  Current ASCCP Guidelines reviewed  3   STD testing  was done , g/c  4   Gardasil recommendations reviewed   5  The following were reviewed in today's visit: breast self exam, family planning choices, adequate intake of calcium and vitamin D, exercise and healthy diet  6  Rx for nuva ring for the year,sent to pharmacy  CC:  Annual Gynecologic Examination    HPI: 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 for annual gynecologic examination  She has the following concerns:  none    Health Maintenance:    She wears her seatbelt routinely  She does not perform regular monthly self breast exams  She feels safe at home  Past Medical History:   Diagnosis Date    Fibromyalgia, primary     Migraine        Past Surgical History:   Procedure Laterality Date    COLONOSCOPY N/A 2018    Procedure: COLONOSCOPY;  Surgeon: Nay Alston MD;  [facility]; Service: Gastroenterology    TONSILLECTOMY      WISDOM TOOTH EXTRACTION         OB/Gyn History:    Pt does not have menstrual issues  History of sexually transmitted infection: No   History of abnormal pap smears: No      Patient is currently sexually active  The current method of family planning is NuvaRing vaginal inserts      OB History      Para Term  AB Living    0 0 0 0 0 0    SAB TAB Ectopic Multiple Live Births    0 0 0 0 0          Family History   Problem Relation Age of Onset    Rheum arthritis Mother     Psoriasis Mother     Other Mother     Hypertension Mother     Diabetes unspecified Mother        Social History:  Social History     Social History    Marital status: Single     Spouse name: N/A    Number of children: N/A    Years of education: N/A     Occupational History    Not on file  Social History Main Topics    Smoking status: Never Smoker    Smokeless tobacco: Never Used    Alcohol use No    Drug use: No    Sexual activity: Yes     Partners: Male     Birth control/ protection: Ring     Other Topics Concern    Not on file     Social History Narrative    No narrative on file     Patient is single  Patient is currently employed at [employer]  [employer], on leave now bc of health issues, f M  And migraines  Ankylosing spondylitis      Allergies   Allergen Reactions    Depakote Er  [Valproic Acid]      Other reaction(s): dilated pupils, "schizi"    Desvenlafaxine      Other reaction(s): state of confusion         Current Outpatient Prescriptions:     celecoxib (CeleBREX) 100 mg capsule, Take 100 mg by mouth 2 (two) times a day, Disp: , Rfl:     Cholecalciferol (VITAMIN D) 2000 units CAPS, Take 1 capsule by mouth daily, Disp: , Rfl:     clonazePAM (KlonoPIN) 1 mg tablet, Take 1 tablet (1 mg total) by mouth daily, Disp: 60 tablet, Rfl: 0    dicyclomine (BENTYL) 20 mg tablet, Take 1 tablet (20 mg total) by mouth every 6 (six) hours for 90 days, Disp: 360 tablet, Rfl: 3    etonogestrel-ethinyl estradiol (NUVARING) 0 12-0 015 MG/24HR vaginal ring, Insert ring for 21 days then remove for one week , Disp: 1 each, Rfl: 0    gabapentin (NEURONTIN) 300 mg capsule, Take 1 capsule (300 mg total) by mouth daily at bedtime (Patient taking differently: Take 600 mg by mouth daily at bedtime  ), Disp: 90 capsule, Rfl: 0    ketorolac (TORADOL) 60 MG/2ML SOLN, 1-2 mL intramuscularly prn migraine onset , Disp: 2 mL, Rfl: 1    naproxen (NAPROSYN) 500 mg tablet, Take 1 tablet by mouth 2 (two) times a day, Disp: , Rfl:     onabotulinumtoxin A (BOTOX) 100 units, Inject as directed, Disp: , Rfl:     prochlorperazine (COMPAZINE) 10 mg tablet, Take by mouth, Disp: , Rfl:     sertraline (ZOLOFT) 100 mg tablet, Take 1 tablet (100 mg total) by mouth 2 (two) times a day, Disp: 180 tablet, Rfl: 1    sulfaSALAzine (AZULFIDINE) 500 mg tablet, Take by mouth, Disp: , Rfl:     SUMAtriptan (IMITREX) 100 mg tablet, Take 1 tablet (100 mg total) by mouth once as needed for migraine for up to 1 dose, Disp: 10 tablet, Rfl: 1    Syringe/Needle, Disp, (SYRINGE 3CC/89RA4-7/4") 27G X 1-1/4" 3 ML MISC, Use for IM injection of ketorolac , Disp: 2 each, Rfl: 2    topiramate (TOPAMAX) 50 MG tablet, Take 2 tablets (100 mg total) by mouth daily at bedtime, Disp: 180 tablet, Rfl: 0    TROKENDI  MG CP24, Take 1 capsule (100 mg total) by mouth daily at bedtime, Disp: 30 capsule, Rfl: 5    Current Facility-Administered Medications:     cyanocobalamin injection 1,000 mcg, 1,000 mcg, Intramuscular, Q30 Days, CHENCHOula Alicew, DO, 1,000 mcg at 04/05/18 1507    promethazine (PHENERGAN) injection 25 mg, 25 mg, Intramuscular, Once, Zula Brew, DO    promethazine (PHENERGAN) injection 25 mg, 25 mg, Intramuscular, Q6H PRN, Zula Brew, DO, 25 mg at 06/04/18 1515    Review of Systems:  Constitutional :no fever, feels well, no tiredness, no recent weight gain or loss  ENT: no ear ache, no loss of hearing, no nosebleeds or nasal discharge, no sore throat or hoarseness  Cardiovascular: no complaints of slow or fast heart beat, no chest pain, no palpitations, no leg claudication or lower extremity edema  Respiratory: no complaints of shortness of shortness of breath, no MAURO  Breasts:no complaints of breast pain, breast lump, or nipple discharge  Gastrointestinal: no complaints of abdominal pain, constipation, nausea, vomiting, or diarrhea or bloody stools  Genitourinary : no complaints of dysuria, incontinence, pelvic pain, no dysmenorrhea, vaginal discharge or abnormal vaginal bleeding and as noted in HPI  Musculoskeletal: no complaints of arthralgia, no myalgia, no joint swelling or stiffness, no limb pain or swelling    Integumentary: no complaints of skin rash or lesion, itching or dry skin  Neurological: no complaints of headache, no confusion, no numbness or tingling, no dizziness or fainting    Objective      Ht 5' 3" (1 6 m)   Wt 69 8 kg (153 lb 12 8 oz)   LMP  (LMP Unknown)   BMI 27 24 kg/m²     General:   appears stated age, cooperative, alert normal mood and affect   Neck: normal, supple,trachea midline, no masses   Heart: regular rate and rhythm, S1, S2 normal, no murmur, click, rub or gallop   Lungs: clear to auscultation bilaterally   Breasts: normal appearance, no masses or tenderness   Abdomen: soft, non-tender, without masses or organomegaly   Vulva: normal female genitalia   Vagina: normal vagina   Urethra: normal   Cervix: Normal, no discharge  Uterus: normal size, contour, position, consistency, mobility, non-tender   Adnexa: normal adnexa   Lymphatic palpation of lymph nodes in neck, axilla, groin and/or other locations: no lymphadenopathy or masses noted   Skin normal skin turgor and no rashes     Psychiatric orientation to person, place, and time: normal  mood and affect: normal

## 2018-06-29 ENCOUNTER — LAB REQUISITION (OUTPATIENT)
Dept: LAB | Facility: HOSPITAL | Age: 28
End: 2018-06-29
Payer: COMMERCIAL

## 2018-06-29 DIAGNOSIS — Z11.3 ENCOUNTER FOR SCREENING FOR INFECTIONS WITH PREDOMINANTLY SEXUAL MODE OF TRANSMISSION: ICD-10-CM

## 2018-07-06 ENCOUNTER — OFFICE VISIT (OUTPATIENT)
Dept: FAMILY MEDICINE CLINIC | Facility: CLINIC | Age: 28
End: 2018-07-06
Payer: COMMERCIAL

## 2018-07-06 VITALS
HEIGHT: 63 IN | BODY MASS INDEX: 26.93 KG/M2 | SYSTOLIC BLOOD PRESSURE: 110 MMHG | WEIGHT: 152 LBS | DIASTOLIC BLOOD PRESSURE: 82 MMHG

## 2018-07-06 DIAGNOSIS — F41.0 PANIC ATTACKS: ICD-10-CM

## 2018-07-06 DIAGNOSIS — G43.709 CHRONIC MIGRAINE WITHOUT AURA WITHOUT STATUS MIGRAINOSUS, NOT INTRACTABLE: ICD-10-CM

## 2018-07-06 DIAGNOSIS — F06.4 ANXIETY DISORDER DUE TO KNOWN PHYSIOLOGICAL CONDITION: ICD-10-CM

## 2018-07-06 DIAGNOSIS — R41.3 MEMORY LOSS: Primary | ICD-10-CM

## 2018-07-06 PROCEDURE — 99213 OFFICE O/P EST LOW 20 MIN: CPT | Performed by: FAMILY MEDICINE

## 2018-07-06 RX ORDER — CLONAZEPAM 1 MG/1
1 TABLET ORAL DAILY
Qty: 30 TABLET | Refills: 0 | Status: SHIPPED | OUTPATIENT
Start: 2018-07-06 | End: 2018-12-04 | Stop reason: SDUPTHER

## 2018-07-06 NOTE — PROGRESS NOTES
Assessment/Plan:    Patient have laboratory studies done when patient gets insurance back  Refills given on medication  Forms completed  Diagnoses and all orders for this visit:    Memory loss    Chronic migraine without aura without status migrainosus, not intractable    Anxiety disorder due to known physiological condition    Panic attacks  -     clonazePAM (KlonoPIN) 1 mg tablet; Take 1 tablet (1 mg total) by mouth daily          Subjective:      Patient ID: Moe Cruz is a 33 y o  female  Patient is here to follow-up on migraines and memory related issues  Headaches have been better overall  Patient has decreased the dose of Topamax and has also decreased the dose of gabapentin  Patient has seen Neurology in follow-up  Patient trying to be switched to once a day to peer may  Patient has noticed some improvement regarding memory but when more fatigued notices that memory is worse  Patient to see ophthalmologist with normal exam   No new neurologic symptoms  The following portions of the patient's history were reviewed and updated as appropriate: allergies, current medications, past family history, past medical history, past social history, past surgical history and problem list     Review of Systems   Constitutional: Negative  HENT: Negative  Eyes: Negative  Respiratory: Negative  Cardiovascular: Negative  Gastrointestinal: Negative  Endocrine: Negative  Genitourinary: Negative  Musculoskeletal: Negative  Skin: Negative  Allergic/Immunologic: Negative  Neurological: Positive for headaches  Hematological: Negative  Psychiatric/Behavioral: Positive for confusion  Objective:      /82 (BP Location: Right arm, Patient Position: Sitting, Cuff Size: Adult)   Ht 5' 3" (1 6 m)   Wt 68 9 kg (152 lb)   LMP  (LMP Unknown)   BMI 26 93 kg/m²          Physical Exam   Constitutional: She appears well-developed and well-nourished  No distress  HENT:   Head: Normocephalic  Right Ear: External ear normal    Left Ear: External ear normal    Mouth/Throat: Oropharynx is clear and moist  No oropharyngeal exudate  Eyes: EOM are normal  Pupils are equal, round, and reactive to light  Right eye exhibits no discharge  Left eye exhibits no discharge  No scleral icterus  Neck: Normal range of motion  Neck supple  No thyromegaly present  Cardiovascular: Normal rate, regular rhythm, normal heart sounds and intact distal pulses  Exam reveals no gallop and no friction rub  No murmur heard  Pulmonary/Chest: Effort normal and breath sounds normal  No respiratory distress  She has no wheezes  She has no rales  She exhibits no tenderness  Abdominal: Soft  Bowel sounds are normal  She exhibits no distension  There is no tenderness  There is no rebound and no guarding  Musculoskeletal: Normal range of motion  She exhibits no edema or tenderness  Lymphadenopathy:     She has no cervical adenopathy  Neurological: She is alert  No cranial nerve deficit  She exhibits normal muscle tone  Coordination normal    Skin: Skin is warm and dry  No rash noted  She is not diaphoretic  No erythema  No pallor  Psychiatric: She has a normal mood and affect  Her behavior is normal  Judgment and thought content normal    Nursing note and vitals reviewed

## 2018-07-23 ENCOUNTER — OFFICE VISIT (OUTPATIENT)
Dept: FAMILY MEDICINE CLINIC | Facility: CLINIC | Age: 28
End: 2018-07-23
Payer: COMMERCIAL

## 2018-07-23 ENCOUNTER — TELEPHONE (OUTPATIENT)
Dept: NEUROLOGY | Facility: CLINIC | Age: 28
End: 2018-07-23

## 2018-07-23 VITALS — BODY MASS INDEX: 26.93 KG/M2 | HEIGHT: 63 IN | WEIGHT: 152 LBS

## 2018-07-23 DIAGNOSIS — G43.101 MIGRAINE WITH AURA AND WITH STATUS MIGRAINOSUS, NOT INTRACTABLE: Primary | ICD-10-CM

## 2018-07-23 PROCEDURE — 99213 OFFICE O/P EST LOW 20 MIN: CPT | Performed by: FAMILY MEDICINE

## 2018-07-23 RX ORDER — PROMETHAZINE HYDROCHLORIDE 25 MG/ML
25 INJECTION, SOLUTION INTRAMUSCULAR; INTRAVENOUS ONCE
Status: COMPLETED | OUTPATIENT
Start: 2018-07-23 | End: 2018-07-23

## 2018-07-23 RX ORDER — MEPERIDINE HYDROCHLORIDE 100 MG/ML
100 INJECTION INTRAMUSCULAR; INTRAVENOUS; SUBCUTANEOUS ONCE
Status: COMPLETED | OUTPATIENT
Start: 2018-07-23 | End: 2018-07-23

## 2018-07-23 RX ADMIN — MEPERIDINE HYDROCHLORIDE 100 MG: 100 INJECTION INTRAMUSCULAR; INTRAVENOUS; SUBCUTANEOUS at 19:39

## 2018-07-23 RX ADMIN — PROMETHAZINE HYDROCHLORIDE 25 MG: 25 INJECTION, SOLUTION INTRAMUSCULAR; INTRAVENOUS at 19:38

## 2018-07-23 NOTE — PROGRESS NOTES
Assessment/Plan:         Diagnoses and all orders for this visit:    Migraine with aura and with status migrainosus, not intractable  -     meperidine (DEMEROL) injection 100 mg; Infuse 1 mL (100 mg total) into a venous catheter once   -     promethazine (PHENERGAN) injection 25 mg; Inject 1 mL (25 mg total) into a muscle once           Subjective:      Patient ID: Violette Carlson is a 33 y o  female  Patient is here with headache, migraine with light sensitivity along with nausea but no vomiting  This all began 3 days ago  No new weakness or numbness  The following portions of the patient's history were reviewed and updated as appropriate: allergies, current medications, past family history, past medical history, past social history, past surgical history and problem list     Review of Systems   Constitutional: Negative  HENT: Negative  Eyes: Negative  Respiratory: Negative  Cardiovascular: Negative  Gastrointestinal: Negative  Endocrine: Negative  Genitourinary: Negative  Musculoskeletal: Negative  Skin: Negative  Allergic/Immunologic: Negative  Neurological: Positive for headaches  Hematological: Negative  Psychiatric/Behavioral: Negative  Objective:      Ht 5' 3" (1 6 m)   Wt 68 9 kg (152 lb)   LMP  (LMP Unknown)   BMI 26 93 kg/m²          Physical Exam   Constitutional: She is oriented to person, place, and time  She appears well-developed and well-nourished  No distress  HENT:   Head: Normocephalic  Right Ear: External ear normal    Left Ear: External ear normal    Mouth/Throat: Oropharynx is clear and moist  No oropharyngeal exudate  Eyes: EOM are normal  Pupils are equal, round, and reactive to light  Right eye exhibits no discharge  Left eye exhibits no discharge  No scleral icterus  Neck: Normal range of motion  Neck supple  No thyromegaly present     Cardiovascular: Normal rate, regular rhythm, normal heart sounds and intact distal pulses  Exam reveals no gallop and no friction rub  No murmur heard  Pulmonary/Chest: Effort normal and breath sounds normal  No respiratory distress  She has no wheezes  She has no rales  She exhibits no tenderness  Abdominal: Soft  Bowel sounds are normal  She exhibits no distension  There is no tenderness  There is no rebound and no guarding  Musculoskeletal: Normal range of motion  She exhibits tenderness  She exhibits no edema  Lymphadenopathy:     She has no cervical adenopathy  Neurological: She is oriented to person, place, and time  No cranial nerve deficit  She exhibits normal muscle tone  Coordination normal    Skin: Skin is warm and dry  No rash noted  She is not diaphoretic  No erythema  No pallor  Psychiatric: She has a normal mood and affect  Her behavior is normal  Judgment and thought content normal    Nursing note and vitals reviewed

## 2018-07-23 NOTE — TELEPHONE ENCOUNTER
Pt states that you discussed prescribing her an injectable but she can't fill this at pharmacy  I called pharmacy, this [facility]   Please advise if you'd like to prescribe an alternative or if you'd like us to proceed with PA?

## 2018-07-24 NOTE — TELEPHONE ENCOUNTER
What is the rx for, I do not see anything in her med list in EPIC  Is this going to be a 1x month injection w/monthly refills?   Please order and then we can submit PA

## 2018-07-24 NOTE — TELEPHONE ENCOUNTER
[address] actually forget the PA  She may need more toradol in the future, and we can do the PA as the need arises  Thanks

## 2018-07-26 ENCOUNTER — OFFICE VISIT (OUTPATIENT)
Dept: FAMILY MEDICINE CLINIC | Facility: CLINIC | Age: 28
End: 2018-07-26
Payer: COMMERCIAL

## 2018-07-26 VITALS
WEIGHT: 150.6 LBS | SYSTOLIC BLOOD PRESSURE: 108 MMHG | DIASTOLIC BLOOD PRESSURE: 80 MMHG | BODY MASS INDEX: 26.68 KG/M2 | HEIGHT: 63 IN

## 2018-07-26 DIAGNOSIS — Z32.00 POSSIBLE PREGNANCY: ICD-10-CM

## 2018-07-26 DIAGNOSIS — G43.709 CHRONIC MIGRAINE WITHOUT AURA WITHOUT STATUS MIGRAINOSUS, NOT INTRACTABLE: Primary | ICD-10-CM

## 2018-07-26 PROCEDURE — 99213 OFFICE O/P EST LOW 20 MIN: CPT | Performed by: FAMILY MEDICINE

## 2018-07-26 NOTE — PROGRESS NOTES
Assessment/Plan:   patient will work 2 hr per day for the next 4 weeks then 3 hr per day  Patient will follow up in 6 weeks  Patient will have urine pregnancy test done at this time  Diagnoses and all orders for this visit:    Chronic migraine without aura without status migrainosus, not intractable    Possible pregnancy          Subjective:      Patient ID: Juanita Issa is a 33 y o  female  Patient is here for follow-up on migraine  Patient will need FMLA paperwork to be filled out  Patient concerned about pregnancy  Wishes urine pregnancy test         The following portions of the patient's history were reviewed and updated as appropriate: allergies, current medications, past family history, past medical history, past social history, past surgical history and problem list     Review of Systems   Constitutional: Negative  HENT: Negative  Eyes: Negative  Respiratory: Negative  Cardiovascular: Negative  Gastrointestinal: Negative  Endocrine: Negative  Genitourinary: Negative  Musculoskeletal: Positive for arthralgias  Skin: Negative  Allergic/Immunologic: Negative  Neurological: Positive for headaches  Hematological: Negative  Psychiatric/Behavioral: Negative  Objective:      /80 (BP Location: Right arm, Patient Position: Sitting, Cuff Size: Adult)   Ht 5' 3 25" (1 607 m)   Wt 68 3 kg (150 lb 9 6 oz)   LMP  (LMP Unknown)   BMI 26 47 kg/m²          Physical Exam   Constitutional: She is oriented to person, place, and time  She appears well-developed and well-nourished  No distress  HENT:   Head: Normocephalic  Right Ear: External ear normal    Left Ear: External ear normal    Mouth/Throat: Oropharynx is clear and moist  No oropharyngeal exudate  Eyes: EOM are normal  Pupils are equal, round, and reactive to light  Right eye exhibits no discharge  Left eye exhibits no discharge  No scleral icterus  Neck: Normal range of motion   Neck supple  No thyromegaly present  Cardiovascular: Normal rate, regular rhythm, normal heart sounds and intact distal pulses  Exam reveals no gallop and no friction rub  No murmur heard  Pulmonary/Chest: Effort normal and breath sounds normal  No respiratory distress  She has no wheezes  She has no rales  She exhibits no tenderness  Abdominal: Soft  Bowel sounds are normal  She exhibits no distension  There is no tenderness  There is no rebound and no guarding  Musculoskeletal: Normal range of motion  She exhibits tenderness  She exhibits no edema  Lymphadenopathy:     She has no cervical adenopathy  Neurological: She is oriented to person, place, and time  No cranial nerve deficit  She exhibits normal muscle tone  Coordination normal    Skin: Skin is warm and dry  No rash noted  She is not diaphoretic  No erythema  No pallor  Psychiatric: She has a normal mood and affect  Her behavior is normal  Judgment and thought content normal    Nursing note and vitals reviewed

## 2018-07-27 ENCOUNTER — TELEPHONE (OUTPATIENT)
Dept: FAMILY MEDICINE CLINIC | Facility: CLINIC | Age: 28
End: 2018-07-27

## 2018-08-03 LAB
CHLAMYDIA DNA CVX QL NAA+PROBE: NORMAL
N GONORRHOEA DNA GENITAL QL NAA+PROBE: NORMAL

## 2018-08-06 ENCOUNTER — HOSPITAL ENCOUNTER (EMERGENCY)
Facility: HOSPITAL | Age: 28
Discharge: HOME | End: 2018-08-06
Attending: EMERGENCY MEDICINE | Admitting: EMERGENCY MEDICINE
Payer: COMMERCIAL

## 2018-08-06 VITALS
DIASTOLIC BLOOD PRESSURE: 80 MMHG | RESPIRATION RATE: 18 BRPM | SYSTOLIC BLOOD PRESSURE: 126 MMHG | HEIGHT: 67 IN | BODY MASS INDEX: 23.54 KG/M2 | TEMPERATURE: 98.7 F | OXYGEN SATURATION: 99 % | WEIGHT: 150 LBS | HEART RATE: 73 BPM

## 2018-08-06 DIAGNOSIS — G43.909 MIGRAINE HEADACHE: Primary | ICD-10-CM

## 2018-08-06 PROCEDURE — 99283 EMERGENCY DEPT VISIT LOW MDM: CPT

## 2018-08-06 PROCEDURE — 96375 TX/PRO/DX INJ NEW DRUG ADDON: CPT

## 2018-08-06 PROCEDURE — 96361 HYDRATE IV INFUSION ADD-ON: CPT

## 2018-08-06 PROCEDURE — 96372 THER/PROPH/DIAG INJ SC/IM: CPT

## 2018-08-06 PROCEDURE — 96374 THER/PROPH/DIAG INJ IV PUSH: CPT

## 2018-08-06 RX ORDER — PROMETHAZINE HYDROCHLORIDE 25 MG/ML
25 INJECTION, SOLUTION INTRAMUSCULAR; INTRAVENOUS ONCE
Status: COMPLETED | OUTPATIENT
Start: 2018-08-06 | End: 2018-08-06

## 2018-08-06 RX ORDER — SODIUM CHLORIDE 9 MG/ML
200 INJECTION, SOLUTION INTRAVENOUS CONTINUOUS
Status: DISCONTINUED | OUTPATIENT
Start: 2018-08-06 | End: 2018-08-06 | Stop reason: HOSPADM

## 2018-08-06 RX ORDER — SUMATRIPTAN 6 MG/.5ML
6 INJECTION, SOLUTION SUBCUTANEOUS ONCE
Status: COMPLETED | OUTPATIENT
Start: 2018-08-06 | End: 2018-08-06

## 2018-08-06 RX ORDER — KETOROLAC TROMETHAMINE 30 MG/ML
30 INJECTION, SOLUTION INTRAMUSCULAR; INTRAVENOUS ONCE
Status: COMPLETED | OUTPATIENT
Start: 2018-08-06 | End: 2018-08-06

## 2018-08-06 RX ADMIN — PROMETHAZINE HYDROCHLORIDE 25 MG: 25 INJECTION INTRAMUSCULAR; INTRAVENOUS at 10:50

## 2018-08-06 RX ADMIN — KETOROLAC TROMETHAMINE 30 MG: 30 INJECTION, SOLUTION INTRAMUSCULAR at 10:50

## 2018-08-06 RX ADMIN — SUMATRIPTAN 6 MG: 6 INJECTION SUBCUTANEOUS at 10:49

## 2018-08-06 RX ADMIN — SODIUM CHLORIDE 200 ML/HR: 0.9 INJECTION, SOLUTION INTRAVENOUS at 10:38

## 2018-08-06 NOTE — DISCHARGE INSTRUCTIONS
Migraine Headache   WHAT YOU SHOULD KNOW:   A migraine is a severe headache  The pain can be so severe that it interferes with your daily activities  A migraine can last a few hours up to several days  The exact cause of migraines is not known  It may be caused by changes in your body chemicals and extra sensitive nerves in your brain  AFTER YOU LEAVE:   Medicines:  Take medicine as soon as you feel a migraine begin  · Pain medicine: You may need medicine to take away or decrease pain  You may need a doctor's order for this medicine  Do not wait until the pain is severe before you take your medicine  · Migraine medicines: These are used to help prevent a migraine or stop it once it starts  · Antinausea medicine: This medicine may be given to calm your stomach and to help prevent vomiting  They can also help relieve pain  · Take your medicine as directed  Call your healthcare provider if you think your medicine is not helping or if you have side effects  Tell him if you are allergic to any medicine  Keep a list of the medicines, vitamins, and herbs you take  Include the amounts, and when and why you take them  Bring the list or the pill bottles to follow-up visits  Carry your medicine list with you in case of an emergency  Manage your symptoms:   · Rest:  Rest in a dark, quiet room  This will help decrease your pain  · Ice:  Ice helps decrease pain  Use an ice pack or put crushed ice in a plastic bag  Cover the ice pack with a towel and place it on your head where it hurts for 15 to 20 minutes every hour  · Heat:  Heat helps decrease pain and muscle spasms  Use a small towel dampened with warm water or a heating pad, or sit in a warm bath  Apply heat on the area for 20 to 30 minutes every 2 hours  You may alternate heat and ice  Keep a headache diary:  Write down when your migraines start and stop  Include your symptoms and what you were doing when a migraine began   Record what you ate or drank for 24 hours before the migraine started  Describe the pain and where it hurts  Keep track of what you did to treat your migraine and whether it worked  Follow up with your primary healthcare provider or neurologist as directed:  Bring your headache diary with you when you see your primary healthcare provider  Write down your questions so you remember to ask them during your visits  Prevent another migraine:   · Do not smoke: If you smoke, it is never too late to quit  Tobacco smoke can trigger a migraine  It can also cause heart disease, lung disease, cancer, and other health problems  Quitting smoking will improve your health and the health of those around you  If you smoke, ask for information about how to stop  · Do not drink alcohol:  Alcohol can trigger a migraine  It can also interfere with the medicines used to treat your migraine  · Get regular exercise:  Exercise may help prevent migraines  Talk to your primary healthcare provider about the best exercise plan for you  · Manage stress:  Stress may trigger a migraine  Learn new ways to relax, such as deep breathing  · Stick to a sleep schedule:  Go to bed and get up at the same time each day  · Eat regular meals:  Include healthy foods such as include fruit, vegetables, whole-grain breads, low-fat dairy products, beans, lean meat, and fish  Avoid trigger foods like chocolate, hard cheese, and red wine  Foods that contain gluten, nitrates, MSG, or artificial sweeteners may also trigger migraines  Caffeine, which is often used to treat migraines, can also trigger them  Contact your primary healthcare provider or neurologist if:   · You have a fever  · Your migraines interfere with your daily activities  · Your medicines or treatments stop working  · You have questions about your condition or care    Seek care immediately or call 911 if:   · You have a headache that seems different or much worse than your usual migraine headache  · You have a severe headache with a fever or a stiff neck  · You have new problems with speech, vision, balance, or movement  · You feel like you are going to faint, you become confused, or you have a seizure  © 2014 [address] is for End User's use only and may not be sold, redistributed or otherwise used for commercial purposes  All illustrations and images included in CareNotes® are the copyrighted property of A D A M [employer]  or Jorje Carrero  The above information is an  only  It is not intended as medical advice for individual conditions or treatments  Talk to your doctor, nurse or pharmacist before following any medical regimen to see if it is safe and effective for you

## 2018-08-06 NOTE — ED PROVIDER NOTES
History  Chief Complaint   Patient presents with    Headache     hx migraine, gets botox shots now every 3 months last dose June 2018  Had one dose of toradal injection which she used on thursday and it helped for a little while  light sensitive, nausea no vomiting     Patient reports a migraine headache which has not resolved in 3 days  Patient has photophobia with the headache  Patient has tried her Imitrex and varicella medications including IM Toradol at home with some relief of the headache simply return the next day  Patient now with complete head pain typical of her migraine headaches  Patient has been treated in the emergency department for migraine headache in the past         History provided by:  Patient and parent      Prior to Admission Medications   Prescriptions Last Dose Informant Patient Reported? Taking?    Cholecalciferol (VITAMIN D) 2000 units CAPS Unknown at Unknown time Self Yes No   Sig: Take 1 capsule by mouth daily   SUMAtriptan (IMITREX) 100 mg tablet 8/5/2018 at Unknown time  No Yes   Sig: Take 1 tablet (100 mg total) by mouth once as needed for migraine for up to 1 dose   Syringe/Needle, Disp, (SYRINGE 3CC/06PS1-0/4") 27G X 1-1/4" 3 ML MISC   No No   Sig: Use for IM injection of ketorolac    celecoxib (CeleBREX) 100 mg capsule Unknown at Unknown time Self Yes No   Sig: Take 100 mg by mouth 2 (two) times a day   clonazePAM (KlonoPIN) 1 mg tablet 8/4/2018  No No   Sig: Take 1 tablet (1 mg total) by mouth daily   dicyclomine (BENTYL) 20 mg tablet More than a month at Unknown time  No No   Sig: Take 1 tablet (20 mg total) by mouth every 6 (six) hours for 90 days   etonogestrel-ethinyl estradiol (NUVARING) 0 12-0 015 MG/24HR vaginal ring 8/6/2018 at Unknown time  No Yes   Sig: Insert ring for 21 days then remove for one week    gabapentin (NEURONTIN) 300 mg capsule 8/5/2018 at Unknown time  No Yes   Sig: Take 1 capsule (300 mg total) by mouth daily at bedtime   Patient taking differently: Take 600 mg by mouth daily at bedtime     naproxen (NAPROSYN) 500 mg tablet 8/5/2018 at Unknown time Self Yes Yes   Sig: Take 1 tablet by mouth 2 (two) times a day   onabotulinumtoxin A (BOTOX) 100 units 6/6/2018 Self Yes No   Sig: Inject as directed   prochlorperazine (COMPAZINE) 10 mg tablet  Self Yes No   Sig: Take by mouth   sertraline (ZOLOFT) 100 mg tablet   No No   Sig: Take 1 tablet (100 mg total) by mouth 2 (two) times a day   sulfaSALAzine (AZULFIDINE) 500 mg tablet 8/5/2018 at Unknown time  Yes Yes   Sig: Take by mouth   topiramate (TOPAMAX) 50 MG tablet  Self No No   Sig: Take 2 tablets (100 mg total) by mouth daily at bedtime      Facility-Administered Medications Last Administration Doses Remaining   cyanocobalamin injection 1,000 mcg 4/5/2018  3:07 PM    promethazine (PHENERGAN) injection 25 mg None recorded 1   promethazine (PHENERGAN) injection 25 mg 6/4/2018  3:15 PM           Past Medical History:   Diagnosis Date    Abnormal Pap smear of cervix     Fibromyalgia, primary     Migraine        Past Surgical History:   Procedure Laterality Date    COLONOSCOPY N/A 5/8/2018    Procedure: COLONOSCOPY;  Surgeon: Selene Bravo MD;  Location: [facility]; Service: Gastroenterology    TONSILLECTOMY      WISDOM TOOTH EXTRACTION         Family History   Problem Relation Age of Onset    Rheum arthritis Mother     Psoriasis Mother     Other Mother     Hypertension Mother     Diabetes unspecified Mother     Sjogren's syndrome Father     Cancer Other     Diabetes Other     Other Other         High blood pressure     I have reviewed and agree with the history as documented  Social History   Substance Use Topics    Smoking status: Never Smoker    Smokeless tobacco: Never Used    Alcohol use No        Review of Systems   Constitutional: Negative for chills and fever  HENT: Negative for rhinorrhea and sore throat  Eyes: Negative for visual disturbance     Respiratory: Negative for cough and shortness of breath  Cardiovascular: Negative for chest pain and leg swelling  Gastrointestinal: Negative for abdominal pain, diarrhea, nausea and vomiting  Genitourinary: Negative for dysuria  Musculoskeletal: Negative for back pain and myalgias  Skin: Negative for rash  Neurological: Positive for headaches  Negative for dizziness  Psychiatric/Behavioral: Negative for confusion  All other systems reviewed and are negative  Physical Exam  Physical Exam   Constitutional: She is oriented to person, place, and time  She appears well-developed and well-nourished  HENT:   Nose: Nose normal    Mouth/Throat: Oropharynx is clear and moist  No oropharyngeal exudate  Eyes: Conjunctivae and EOM are normal  Pupils are equal, round, and reactive to light  No scleral icterus  Neck: Normal range of motion  Neck supple  No JVD present  No tracheal deviation present  Cardiovascular: Normal rate, regular rhythm and normal heart sounds  No murmur heard  Pulmonary/Chest: Effort normal and breath sounds normal  No respiratory distress  She has no wheezes  She has no rales  Abdominal: Soft  Bowel sounds are normal  There is no tenderness  There is no guarding  Musculoskeletal: Normal range of motion  She exhibits no edema or tenderness  Neurological: She is alert and oriented to person, place, and time  No cranial nerve deficit or sensory deficit  She exhibits normal muscle tone  5/5 motor, nl sensation, patient is wearing sunglasses in the emergency department due to headache getting worse with light and ice  Skin: Skin is warm and dry  Psychiatric: She has a normal mood and affect  Her behavior is normal    Nursing note and vitals reviewed        Vital Signs  ED Triage Vitals [08/06/18 1018]   Temperature Pulse Respirations Blood Pressure SpO2   99 1 °F (37 3 °C) 92 18 126/80 95 %      Temp Source Heart Rate Source Patient Position - Orthostatic VS BP Location FiO2 (%)   Tympanic Monitor Sitting Left arm --      Pain Score       6           Vitals:    08/06/18 1018 08/06/18 1153   BP: 126/80    Pulse: 92 73   Patient Position - Orthostatic VS: Sitting Sitting       Visual Acuity  Visual Acuity      Most Recent Value   L Pupil Size (mm)  3   R Pupil Size (mm)  3          ED Medications  Medications   SUMAtriptan (IMITREX) subcutaneous injection 6 mg (6 mg Subcutaneous Given 8/6/18 1049)   ketorolac (TORADOL) injection 30 mg (30 mg Intravenous Given 8/6/18 1050)   promethazine (PHENERGAN) injection 25 mg (25 mg Intravenous Given 8/6/18 1050)       Diagnostic Studies  Results Reviewed     None                 No orders to display              Procedures  Procedures       Phone Contacts  ED Phone Contact    ED Course  ED Course as of Aug 06 1448   Mon Aug 06, 2018   1124 Patient's migraine headache down to a 1/10 at this time  My plan is to discharge patient to the care of her mother  MDM  CritCare Time    Disposition  Final diagnoses:   Migraine headache     Time reflects when diagnosis was documented in both MDM as applicable and the Disposition within this note     Time User Action Codes Description Comment    8/6/2018 11:24 AM Elfredia Sake Add [G43 909] Migraine headache       ED Disposition     ED Disposition Condition Comment    Discharge  Cruz Schneider discharge to home/self care      Condition at discharge: Good        Follow-up Information     Follow up With Specialties Details Why Contact Daniele Burgos,  Family Medicine In 2 days for re-evaluation [address] [facility]  [phone]            Discharge Medication List as of 8/6/2018 11:28 AM      CONTINUE these medications which have NOT CHANGED    Details   celecoxib (CeleBREX) 100 mg capsule Take 100 mg by mouth 2 (two) times a day, Historical Med      Cholecalciferol (VITAMIN D) 2000 units CAPS Take 1 capsule by mouth daily, Starting Mon 8/11/2014, Historical Med      clonazePAM (KlonoPIN) 1 mg tablet Take 1 tablet (1 mg total) by mouth daily, Starting Fri 7/6/2018, Normal      dicyclomine (BENTYL) 20 mg tablet Take 1 tablet (20 mg total) by mouth every 6 (six) hours for 90 days, Starting Tue 5/8/2018, Until Mon 8/6/2018, Normal      etonogestrel-ethinyl estradiol (NUVARING) 0 12-0 015 MG/24HR vaginal ring Insert ring for 21 days then remove for one week , Normal      gabapentin (NEURONTIN) 300 mg capsule Take 1 capsule (300 mg total) by mouth daily at bedtime, Starting Fri 6/15/2018, Normal      naproxen (NAPROSYN) 500 mg tablet Take 1 tablet by mouth 2 (two) times a day, Starting Wed 2/22/2017, Historical Med      onabotulinumtoxin A (BOTOX) 100 units Inject as directed, Starting Tue 8/29/2017, Historical Med      prochlorperazine (COMPAZINE) 10 mg tablet Take by mouth, Starting Thu 6/22/2017, Historical Med      sertraline (ZOLOFT) 100 mg tablet Take 1 tablet (100 mg total) by mouth 2 (two) times a day, Starting Thu 5/24/2018, Normal      sulfaSALAzine (AZULFIDINE) 500 mg tablet Take by mouth, Historical Med      SUMAtriptan (IMITREX) 100 mg tablet Take 1 tablet (100 mg total) by mouth once as needed for migraine for up to 1 dose, Starting Thu 4/5/2018, Normal      Syringe/Needle, Disp, (SYRINGE 3CC/44JE5-5/4") 27G X 1-1/4" 3 ML MISC Use for IM injection of ketorolac , Normal      topiramate (TOPAMAX) 50 MG tablet Take 2 tablets (100 mg total) by mouth daily at bedtime, Starting Fri 6/15/2018, Normal           No discharge procedures on file      ED Provider  Electronically Signed by           Kaylan Wallace DO  08/06/18 1870

## 2018-08-07 ENCOUNTER — TELEPHONE (OUTPATIENT)
Dept: NEUROLOGY | Facility: CLINIC | Age: 28
End: 2018-08-07

## 2018-08-10 ENCOUNTER — DOCUMENTATION (OUTPATIENT)
Dept: NEUROLOGY | Facility: CLINIC | Age: 28
End: 2018-08-10

## 2018-08-20 ENCOUNTER — OFFICE VISIT (OUTPATIENT)
Dept: FAMILY MEDICINE CLINIC | Facility: CLINIC | Age: 28
End: 2018-08-20
Payer: COMMERCIAL

## 2018-08-20 VITALS
SYSTOLIC BLOOD PRESSURE: 94 MMHG | WEIGHT: 150 LBS | HEART RATE: 92 BPM | DIASTOLIC BLOOD PRESSURE: 66 MMHG | HEIGHT: 67 IN | BODY MASS INDEX: 23.54 KG/M2 | TEMPERATURE: 97.8 F

## 2018-08-20 DIAGNOSIS — F32.1 CURRENT MODERATE EPISODE OF MAJOR DEPRESSIVE DISORDER WITHOUT PRIOR EPISODE (HCC): Primary | ICD-10-CM

## 2018-08-20 DIAGNOSIS — F33.9 RECURRENT MAJOR DEPRESSIVE DISORDER, REMISSION STATUS UNSPECIFIED (HCC): Primary | ICD-10-CM

## 2018-08-20 PROCEDURE — 99213 OFFICE O/P EST LOW 20 MIN: CPT | Performed by: FAMILY MEDICINE

## 2018-08-20 RX ORDER — SYRINGE WITH NEEDLE, 1 ML 25GX5/8"
SYRINGE, EMPTY DISPOSABLE MISCELLANEOUS
COMMUNITY
Start: 2018-07-26

## 2018-08-20 RX ORDER — MIRTAZAPINE 15 MG/1
15 TABLET, FILM COATED ORAL
Qty: 30 TABLET | Refills: 1 | Status: SHIPPED | OUTPATIENT
Start: 2018-08-20 | End: 2018-09-12 | Stop reason: SDUPTHER

## 2018-08-20 NOTE — PROGRESS NOTES
Assessment/Plan:   patient will continue with counseling  Patient will continue with Zoloft as directed  Patient will have Remeron added nightly  Patient will be referred to Psychiatry  Follow-up in 1 month       Diagnoses and all orders for this visit:    Current moderate episode of major depressive disorder without prior episode (HCC)  -     mirtazapine (REMERON) 15 mg tablet; Take 1 tablet (15 mg total) by mouth daily at bedtime    Other orders  -     B-D 3CC LUER-DEANN SYR 25GX1" 25G X 1" 3 ML MISC;           Subjective:      Patient ID: Samuel Riley is a 33 y o  female  Patient is here with some insomnia issues as well as depression  No suicidal ideation  The following portions of the patient's history were reviewed and updated as appropriate: allergies, current medications, past family history, past medical history, past social history, past surgical history and problem list     Review of Systems   Constitutional: Negative  HENT: Negative  Eyes: Negative  Respiratory: Negative  Cardiovascular: Negative  Gastrointestinal: Negative  Endocrine: Negative  Genitourinary: Negative  Musculoskeletal: Negative  Skin: Negative  Allergic/Immunologic: Negative  Neurological: Negative  Hematological: Negative  Psychiatric/Behavioral: Positive for behavioral problems and sleep disturbance  Objective:      BP 94/66 (BP Location: Right arm)   Pulse 92   Temp 97 8 °F (36 6 °C)   Ht 5' 7" (1 702 m)   Wt 68 kg (150 lb)   BMI 23 49 kg/m²          Physical Exam   Constitutional: She appears well-developed  Cardiovascular: Normal rate and regular rhythm  Pulmonary/Chest: Effort normal and breath sounds normal    Psychiatric: Her behavior is normal  Judgment and thought content normal    Nursing note and vitals reviewed

## 2018-08-20 NOTE — PROGRESS NOTES
Assessment/Plan:       Diagnoses and all orders for this visit:    Current moderate episode of major depressive disorder without prior episode (HCC)  -     mirtazapine (REMERON) 15 mg tablet; Take 1 tablet (15 mg total) by mouth daily at bedtime    Other orders  -     B-D 3CC LUER-DEANN SYR 25GX1" 25G X 1" 3 ML MISC;           Subjective:      Patient ID: Eduardo Gonsalez is a 33 y o  female      HPI    The following portions of the patient's history were reviewed and updated as appropriate: allergies, current medications, past family history, past medical history, past social history, past surgical history and problem list     Review of Systems      Objective:      BP 94/66 (BP Location: Right arm)   Pulse 92   Temp 97 8 °F (36 6 °C)   Ht 5' 7" (1 702 m)   Wt 68 kg (150 lb)   BMI 23 49 kg/m²          Physical Exam

## 2018-09-12 ENCOUNTER — OFFICE VISIT (OUTPATIENT)
Dept: FAMILY MEDICINE CLINIC | Facility: CLINIC | Age: 28
End: 2018-09-12
Payer: COMMERCIAL

## 2018-09-12 VITALS
DIASTOLIC BLOOD PRESSURE: 80 MMHG | WEIGHT: 159 LBS | SYSTOLIC BLOOD PRESSURE: 112 MMHG | TEMPERATURE: 97.6 F | HEIGHT: 67 IN | BODY MASS INDEX: 24.96 KG/M2

## 2018-09-12 DIAGNOSIS — K04.7 DENTAL INFECTION: ICD-10-CM

## 2018-09-12 DIAGNOSIS — F32.1 CURRENT MODERATE EPISODE OF MAJOR DEPRESSIVE DISORDER WITHOUT PRIOR EPISODE (HCC): ICD-10-CM

## 2018-09-12 DIAGNOSIS — F32.0 CURRENT MILD EPISODE OF MAJOR DEPRESSIVE DISORDER WITHOUT PRIOR EPISODE (HCC): ICD-10-CM

## 2018-09-12 DIAGNOSIS — F41.1 GENERALIZED ANXIETY DISORDER: Primary | ICD-10-CM

## 2018-09-12 PROCEDURE — 99214 OFFICE O/P EST MOD 30 MIN: CPT | Performed by: FAMILY MEDICINE

## 2018-09-12 PROCEDURE — 3008F BODY MASS INDEX DOCD: CPT | Performed by: FAMILY MEDICINE

## 2018-09-12 RX ORDER — AMOXICILLIN 500 MG/1
1000 TABLET, FILM COATED ORAL 2 TIMES DAILY
Qty: 28 TABLET | Refills: 0 | Status: SHIPPED | OUTPATIENT
Start: 2018-09-12 | End: 2018-09-19

## 2018-09-12 RX ORDER — MIRTAZAPINE 7.5 MG/1
7.5 TABLET, FILM COATED ORAL
Qty: 30 TABLET | Refills: 2 | Status: SHIPPED | OUTPATIENT
Start: 2018-09-12 | End: 2018-11-08

## 2018-09-12 NOTE — PROGRESS NOTES
Assessment/Plan:   patient use amoxicillin for dental infection  Patient will continue with Remeron 7 5 mg daily at night  Patient will continue with Zoloft  Patient will see Psychiatry in the new year  Diagnoses and all orders for this visit:    Generalized anxiety disorder    Current mild episode of major depressive disorder without prior episode (HCC)    Dental infection  -     amoxicillin (AMOXIL) 500 MG tablet; Take 2 tablets (1,000 mg total) by mouth 2 (two) times a day for 7 days    Current moderate episode of major depressive disorder without prior episode (HCC)  -     mirtazapine (REMERON) 7 5 MG tablet; Take 1 tablet (7 5 mg total) by mouth daily at bedtime          Subjective:      Patient ID: Kathe Kenyon is a 33 y o  female  Patient follow-up on anxiety and depression  Patient notice some dizziness and foggy sensation when starting Remeron  Patient cut those in half and is feeling better but it still persists to some degree  No homicidal or suicidal ideation  Patient is sleeping better but still wakes up a lot  The following portions of the patient's history were reviewed and updated as appropriate: allergies, current medications, past family history, past medical history, past social history, past surgical history and problem list     Review of Systems   Constitutional: Negative  HENT: Negative  Eyes: Negative  Respiratory: Negative  Cardiovascular: Negative  Gastrointestinal: Negative  Endocrine: Negative  Genitourinary: Negative  Musculoskeletal: Negative  Skin: Negative  Allergic/Immunologic: Negative  Neurological: Negative  Hematological: Negative  Psychiatric/Behavioral: Negative            Objective:      /80 (BP Location: Right arm, Patient Position: Sitting, Cuff Size: Standard)   Temp 97 6 °F (36 4 °C) (Tympanic)   Ht 5' 7" (1 702 m)   Wt 72 1 kg (159 lb)   BMI 24 90 kg/m²          Physical Exam   Constitutional: She appears well-developed  Cardiovascular: Normal rate and regular rhythm  Pulmonary/Chest: Effort normal and breath sounds normal    Psychiatric: She has a normal mood and affect  Her behavior is normal  Judgment and thought content normal    Nursing note and vitals reviewed

## 2018-09-14 ENCOUNTER — TELEPHONE (OUTPATIENT)
Dept: NEUROLOGY | Facility: CLINIC | Age: 28
End: 2018-09-14

## 2018-09-14 ENCOUNTER — PROCEDURE VISIT (OUTPATIENT)
Dept: NEUROLOGY | Facility: CLINIC | Age: 28
End: 2018-09-14
Payer: COMMERCIAL

## 2018-09-14 VITALS
SYSTOLIC BLOOD PRESSURE: 102 MMHG | DIASTOLIC BLOOD PRESSURE: 70 MMHG | HEART RATE: 96 BPM | HEIGHT: 63 IN | TEMPERATURE: 98.8 F | RESPIRATION RATE: 14 BRPM

## 2018-09-14 DIAGNOSIS — G43.709 CHRONIC MIGRAINE WITHOUT AURA WITHOUT STATUS MIGRAINOSUS, NOT INTRACTABLE: Primary | ICD-10-CM

## 2018-09-14 PROCEDURE — 64615 CHEMODENERV MUSC MIGRAINE: CPT | Performed by: PHYSICIAN ASSISTANT

## 2018-09-14 RX ORDER — SYRINGE W-NEEDLE,DISPOSAB,3 ML 25GX5/8"
SYRINGE, EMPTY DISPOSABLE MISCELLANEOUS
Qty: 4 EACH | Refills: 0 | Status: SHIPPED | OUTPATIENT
Start: 2018-09-14 | End: 2019-01-10 | Stop reason: SDUPTHER

## 2018-09-14 RX ORDER — PROCHLORPERAZINE MALEATE 10 MG
10 TABLET ORAL EVERY 6 HOURS PRN
Qty: 30 TABLET | Refills: 0 | Status: SHIPPED | OUTPATIENT
Start: 2018-09-14 | End: 2020-11-11

## 2018-09-14 RX ORDER — KETOROLAC TROMETHAMINE 30 MG/ML
30-60 INJECTION, SOLUTION INTRAMUSCULAR; INTRAVENOUS EVERY 6 HOURS PRN
Qty: 4 ML | Refills: 1 | Status: SHIPPED | OUTPATIENT
Start: 2018-09-14 | End: 2019-01-10 | Stop reason: SDUPTHER

## 2018-09-14 NOTE — TELEPHONE ENCOUNTER
Pharmacy called - toradol inj not covered  FYI     toradol not formulary for medicaid plans  However Diclofenac is   Would this be appropriate for pt?

## 2018-09-14 NOTE — PROGRESS NOTES
Chemodenervation  Date/Time: 9/14/2018 7:38 AM  Performed by: Amira Darling  Authorized by: Duy Lieberman details:     Prepped With: Alcohol    Procedure details:     Position:  Upright  Botox:     Botox Type:  Type A    Brand:  Botox    mL's of Botulinum Toxin:  155    Final Concentration per CC:  50 units    Needle Gauge:  30 G 2 5 inch  Procedures:     Botox Procedures: chronic headache      Indications: migraines    Injection Location:     Head / Face:  L , R , L frontalis, R frontalis, R inferior cervical paraspinal, L inferior cervical paraspinal, L medial occipitalis, R medial occipitalis, L lateral occipitalis, R lateral occipitalis, procerus, L temporalis, R temporalis, R superior trapezius and L superior trapezius    L  injection amount:  5 unit(s)    R  injection amount:  5 unit(s)    L lateral frontalis:  5 unit(s)    R lateral frontalis:  5 unit(s)    L medial frontalis:  5 unit(s)    R medial frontalis:  5 unit(s)    L temporalis injection amount:  20 unit(s)    R temporalis injection amount:  20 unit(s)    Procerus injection amount:  5 unit(s)    L lateral occipitalis injection amount:  5 unit(s)    R lateral occipitalis injection amount:  5 unit(s)    L medial occipitalis injection amount:  10 unit(s)    R medial occipitalis injection amount:  10 unit(s)    L inferior cervical paraspinal injection amount:  10 unit(s)    R inferior cervical paraspinal injection amount:  10 unit(s)    L superior trapezius injection amount:  0 unit(s)    R superior trapezius injection amount:  0 unit(s)  Total Units:     Total units used:  155    Total units discarded:  45  Post-procedure details:     Chemodenervation:  Chronic migraine    Facial Nerve Location[de-identified]  Bilateral facial nerve    Patient tolerance of procedure:   Tolerated well, no immediate complications  Comments:      I avoided the trap muscles b/l per pt request  Extra 30 units in the b/l temporoparietal region and scalp (headband region), medically necessary            Vitals:    09/14/18 0719   BP: 102/70   Pulse: 96   Resp: 14   Temp: 98 8 °F (37 1 °C)

## 2018-10-19 ENCOUNTER — OFFICE VISIT (OUTPATIENT)
Dept: FAMILY MEDICINE CLINIC | Facility: CLINIC | Age: 28
End: 2018-10-19
Payer: COMMERCIAL

## 2018-10-19 VITALS
SYSTOLIC BLOOD PRESSURE: 114 MMHG | TEMPERATURE: 99.5 F | HEIGHT: 67 IN | WEIGHT: 171.8 LBS | DIASTOLIC BLOOD PRESSURE: 74 MMHG | BODY MASS INDEX: 26.97 KG/M2

## 2018-10-19 DIAGNOSIS — F32.0 MILD SINGLE CURRENT EPISODE OF MAJOR DEPRESSIVE DISORDER (HCC): ICD-10-CM

## 2018-10-19 PROCEDURE — 99213 OFFICE O/P EST LOW 20 MIN: CPT | Performed by: FAMILY MEDICINE

## 2018-10-19 RX ORDER — SERTRALINE HYDROCHLORIDE 100 MG/1
100 TABLET, FILM COATED ORAL 2 TIMES DAILY
Qty: 180 TABLET | Refills: 0 | Status: SHIPPED | OUTPATIENT
Start: 2018-10-19 | End: 2019-07-01 | Stop reason: ALTCHOICE

## 2018-10-19 RX ADMIN — CYANOCOBALAMIN 1000 MCG: 1000 INJECTION INTRAMUSCULAR; SUBCUTANEOUS at 13:27

## 2018-10-19 NOTE — PROGRESS NOTES
Assessment/Plan:  Patient improving overall  Patient will check weight on a weekly basis  Refills given  The patient will increase work schedule to 4 hours a day  Follow-up in 1 month       Diagnoses and all orders for this visit:    Mild single current episode of major depressive disorder (HCC)  -     sertraline (ZOLOFT) 100 mg tablet; Take 1 tablet (100 mg total) by mouth 2 (two) times a day          Subjective:      Patient ID: Catrina Henriquez is a 33 y o  female  Patient follow-up on depression  This has been fairly stable overall  Patient is getting up and out of bed  Patient getting out more  Patient is spending more time at work  Patient is working 3 hours per day 5 days a week  Patient with some increased soreness recently over the past week  Patient also feeling slightly more run down recently  The following portions of the patient's history were reviewed and updated as appropriate: allergies, current medications, past family history, past medical history, past social history, past surgical history and problem list     Review of Systems   Constitutional: Positive for fatigue  HENT: Negative  Eyes: Negative  Respiratory: Negative  Cardiovascular: Negative  Gastrointestinal: Negative  Endocrine: Negative  Genitourinary: Negative  Musculoskeletal: Negative  Skin: Negative  Allergic/Immunologic: Negative  Neurological: Negative  Hematological: Negative  Psychiatric/Behavioral: Negative  Objective:      /74 (BP Location: Right arm, Patient Position: Sitting, Cuff Size: Standard)   Temp 99 5 °F (37 5 °C) (Tympanic)   Ht 5' 7" (1 702 m)   Wt 77 9 kg (171 lb 12 8 oz)   BMI 26 91 kg/m²          Physical Exam   Constitutional: She is oriented to person, place, and time  She appears well-developed and well-nourished  No distress  HENT:   Head: Normocephalic     Right Ear: External ear normal    Left Ear: External ear normal  Mouth/Throat: Oropharynx is clear and moist  No oropharyngeal exudate  Eyes: Pupils are equal, round, and reactive to light  EOM are normal  Right eye exhibits no discharge  Left eye exhibits no discharge  No scleral icterus  Neck: Normal range of motion  Neck supple  No thyromegaly present  Cardiovascular: Normal rate, regular rhythm, normal heart sounds and intact distal pulses  Exam reveals no gallop and no friction rub  No murmur heard  Pulmonary/Chest: Effort normal and breath sounds normal  No respiratory distress  She has no wheezes  She has no rales  She exhibits no tenderness  Abdominal: Soft  Bowel sounds are normal  She exhibits no distension  There is no tenderness  There is no rebound and no guarding  Musculoskeletal: Normal range of motion  She exhibits no edema or tenderness  Lymphadenopathy:     She has no cervical adenopathy  Neurological: She is oriented to person, place, and time  No cranial nerve deficit  She exhibits normal muscle tone  Coordination normal    Skin: Skin is warm and dry  No rash noted  She is not diaphoretic  No erythema  No pallor  Psychiatric: She has a normal mood and affect  Her behavior is normal  Judgment and thought content normal    Nursing note and vitals reviewed

## 2018-10-30 ENCOUNTER — TELEPHONE (OUTPATIENT)
Dept: NEUROLOGY | Facility: CLINIC | Age: 28
End: 2018-10-30

## 2018-10-30 NOTE — TELEPHONE ENCOUNTER
[facility] 89 called to state that pt has new insurance and a PA is needed for Trokendi XR  [facility] [phone], [id number]  ID [id number]  PCN none  [facility] [id number]  Rx group [id number]    Medication approved until 10/30/2019   Pharmacy made aware of approval

## 2018-11-08 ENCOUNTER — TELEPHONE (OUTPATIENT)
Dept: FAMILY MEDICINE CLINIC | Facility: CLINIC | Age: 28
End: 2018-11-08

## 2018-11-08 ENCOUNTER — OFFICE VISIT (OUTPATIENT)
Dept: FAMILY MEDICINE CLINIC | Facility: CLINIC | Age: 28
End: 2018-11-08
Payer: COMMERCIAL

## 2018-11-08 VITALS
SYSTOLIC BLOOD PRESSURE: 120 MMHG | HEIGHT: 67 IN | DIASTOLIC BLOOD PRESSURE: 82 MMHG | WEIGHT: 178 LBS | BODY MASS INDEX: 27.94 KG/M2 | HEART RATE: 97 BPM

## 2018-11-08 DIAGNOSIS — M79.7 FIBROMYALGIA: Primary | ICD-10-CM

## 2018-11-08 PROCEDURE — 99213 OFFICE O/P EST LOW 20 MIN: CPT | Performed by: FAMILY MEDICINE

## 2018-11-08 RX ORDER — ARIPIPRAZOLE 2 MG/1
2 TABLET ORAL DAILY
Qty: 30 TABLET | Refills: 1 | Status: SHIPPED | OUTPATIENT
Start: 2018-11-08 | End: 2019-01-31 | Stop reason: SDUPTHER

## 2018-11-08 RX ORDER — PREGABALIN 25 MG/1
25 CAPSULE ORAL 2 TIMES DAILY
Qty: 60 CAPSULE | Refills: 3 | Status: SHIPPED | OUTPATIENT
Start: 2018-11-08 | End: 2019-03-05 | Stop reason: SDUPTHER

## 2018-11-08 RX ORDER — SULFASALAZINE 500 MG/1
500 TABLET ORAL 2 TIMES DAILY
Qty: 60 TABLET | Refills: 3 | Status: SHIPPED | OUTPATIENT
Start: 2018-11-08 | End: 2019-08-28

## 2018-11-08 NOTE — PROGRESS NOTES
Assessment/Plan:  Patient will stop Remeron  Diagnoses and all orders for this visit:    Fibromyalgia  -     pregabalin (LYRICA) 25 mg capsule; Take 1 capsule (25 mg total) by mouth 2 (two) times a day  -     sulfaSALAzine (AZULFIDINE) 500 mg tablet; Take 1 tablet (500 mg total) by mouth 2 (two) times a day  -     ARIPiprazole (ABILIFY) 2 mg tablet; Take 1 tablet (2 mg total) by mouth daily          Subjective:      Patient ID: Emelina Graham is a 33 y o  female  Patient is here with tingling in legs bilaterally over the past 4 days  No arm involvement  No significant change with urination or defecation  No change in medications etc   Patient's symptoms are worse at night but do occur during the day  Patient has noticed some swelling of lower extremities  no Chest pain or shortness of breath  Patient had migraine yesterday  The following portions of the patient's history were reviewed and updated as appropriate: allergies, current medications, past family history, past medical history, past social history, past surgical history and problem list     Review of Systems   Constitutional: Negative  HENT: Negative  Eyes: Negative  Respiratory: Negative  Cardiovascular: Negative  Gastrointestinal: Negative  Endocrine: Negative  Genitourinary: Negative  Musculoskeletal: Positive for arthralgias  Skin: Negative  Allergic/Immunologic: Negative  Neurological: Positive for numbness  Hematological: Negative  Psychiatric/Behavioral: Negative  Objective:      /82 (BP Location: Right arm, Patient Position: Sitting, Cuff Size: Standard)   Pulse 97   Ht 5' 7" (1 702 m)   Wt 80 7 kg (178 lb)   BMI 27 88 kg/m²          Physical Exam   Constitutional: She appears well-developed  Cardiovascular: Normal rate and regular rhythm  Pulmonary/Chest: Effort normal and breath sounds normal    Musculoskeletal: She exhibits tenderness     Pain with palpation and lower extremities from the knees distally  Gross sensation to light touch intact bilaterally   Vitals reviewed

## 2018-11-08 NOTE — TELEPHONE ENCOUNTER
[address] called  You sent in a script for Topamax for the patient; however, patient has a script already for Trokendi XR that was prescribed by Neuro  This medication is basically extended release Topamax  Pharmacy states this is a duplicate therapy and does not want to dispense without your authorization  Please advise what we should tell the pharmacy in a call back? Thank you

## 2018-11-08 NOTE — LETTER
November 8, 2018     Patient: Jose Chacon   YOB: 1990   Date of Visit: 11/8/2018       To Whom it May Concern:    Alejandrina Adonis is under my professional care  She was seen in my office on 11/8/2018  She may return to work on 11/09/2018  If you have any questions or concerns, please don't hesitate to call           Sincerely,          Noni Smyth DO        CC: No Recipients

## 2018-11-13 ENCOUNTER — OFFICE VISIT (OUTPATIENT)
Dept: FAMILY MEDICINE CLINIC | Facility: CLINIC | Age: 28
End: 2018-11-13
Payer: COMMERCIAL

## 2018-11-13 VITALS
TEMPERATURE: 100.3 F | WEIGHT: 178 LBS | RESPIRATION RATE: 16 BRPM | BODY MASS INDEX: 27.94 KG/M2 | OXYGEN SATURATION: 98 % | HEART RATE: 102 BPM | HEIGHT: 67 IN | SYSTOLIC BLOOD PRESSURE: 120 MMHG | DIASTOLIC BLOOD PRESSURE: 82 MMHG

## 2018-11-13 DIAGNOSIS — J00 ACUTE NASOPHARYNGITIS: Primary | ICD-10-CM

## 2018-11-13 PROBLEM — J06.9 UPPER RESPIRATORY INFECTION: Status: ACTIVE | Noted: 2018-11-13

## 2018-11-13 PROCEDURE — 99213 OFFICE O/P EST LOW 20 MIN: CPT | Performed by: FAMILY MEDICINE

## 2018-11-13 RX ORDER — TOPIRAMATE 100 MG/1
1 CAPSULE, EXTENDED RELEASE ORAL DAILY
Refills: 3 | COMMUNITY
Start: 2018-10-30 | End: 2019-01-03 | Stop reason: SDUPTHER

## 2018-11-13 RX ORDER — AMOXICILLIN 500 MG/1
1000 TABLET, FILM COATED ORAL 2 TIMES DAILY
Qty: 28 TABLET | Refills: 0 | Status: SHIPPED | OUTPATIENT
Start: 2018-11-13 | End: 2018-11-20

## 2018-11-13 NOTE — LETTER
November 13, 2018     Patient: Amauri Mohan   YOB: 1990   Date of Visit: 11/13/2018       To Whom it May Concern:    Alexander Chapin is under my professional care  She was seen in my office on 11/13/2018  She may return to work on 11/15/2018  If you have any questions or concerns, please don't hesitate to call           Sincerely,          Alfonso Irving DO        CC: No Recipients

## 2018-11-13 NOTE — PROGRESS NOTES
Assessment/Plan:         Diagnoses and all orders for this visit:    Acute nasopharyngitis  -     amoxicillin (AMOXIL) 500 MG tablet; Take 2 tablets (1,000 mg total) by mouth 2 (two) times a day for 7 days    Other orders  -     TROKENDI  MG CP24; Take 1 tablet by mouth daily          Subjective:      Patient ID: Cindy Mason is a 33 y o  female  Patient is here with sore throat, fevers and chills which began Sunday night  Patient has tried ibuprofen  Patient does have cough and some sputum production  No significant rhinorrhea  No vomiting or diarrhea noted  Sore Throat    Associated symptoms include coughing  Pertinent negatives include no congestion  The following portions of the patient's history were reviewed and updated as appropriate: allergies, current medications, past family history, past medical history, past social history, past surgical history and problem list     Review of Systems   Constitutional: Positive for chills and fever  HENT: Positive for sore throat  Negative for congestion  Eyes: Negative  Respiratory: Positive for cough  Cardiovascular: Negative  Gastrointestinal: Negative  Endocrine: Negative  Genitourinary: Negative  Musculoskeletal: Negative  Skin: Negative  Allergic/Immunologic: Negative  Neurological: Negative  Hematological: Negative  Psychiatric/Behavioral: Negative  Objective:      /82 (BP Location: Right arm, Patient Position: Sitting, Cuff Size: Adult)   Pulse 102   Temp 100 3 °F (37 9 °C) (Tympanic)   Resp 16   Ht 5' 7" (1 702 m)   Wt 80 7 kg (178 lb)   SpO2 98%   BMI 27 88 kg/m²          Physical Exam   Constitutional: She is oriented to person, place, and time  She appears well-developed and well-nourished  No distress  HENT:   Head: Normocephalic  Right Ear: External ear normal    Left Ear: External ear normal    Mouth/Throat: Oropharyngeal exudate present     Eyes: Pupils are equal, round, and reactive to light  EOM are normal  Right eye exhibits no discharge  Left eye exhibits no discharge  No scleral icterus  Neck: Normal range of motion  Neck supple  No thyromegaly present  Cardiovascular: Normal rate, regular rhythm, normal heart sounds and intact distal pulses  Exam reveals no gallop and no friction rub  No murmur heard  Pulmonary/Chest: Effort normal and breath sounds normal  No respiratory distress  She has no wheezes  She has no rales  She exhibits no tenderness  Abdominal: Soft  Bowel sounds are normal  She exhibits no distension  There is no tenderness  There is no rebound and no guarding  Musculoskeletal: Normal range of motion  She exhibits no edema or tenderness  Lymphadenopathy:     She has cervical adenopathy  Neurological: She is oriented to person, place, and time  No cranial nerve deficit  She exhibits normal muscle tone  Coordination normal    Skin: Skin is warm and dry  No rash noted  She is not diaphoretic  No erythema  No pallor  Psychiatric: She has a normal mood and affect  Her behavior is normal  Judgment and thought content normal    Nursing note and vitals reviewed

## 2018-11-19 ENCOUNTER — OFFICE VISIT (OUTPATIENT)
Dept: FAMILY MEDICINE CLINIC | Facility: CLINIC | Age: 28
End: 2018-11-19
Payer: COMMERCIAL

## 2018-11-19 VITALS
DIASTOLIC BLOOD PRESSURE: 82 MMHG | HEIGHT: 67 IN | BODY MASS INDEX: 27.78 KG/M2 | WEIGHT: 177 LBS | TEMPERATURE: 99.9 F | SYSTOLIC BLOOD PRESSURE: 116 MMHG

## 2018-11-19 DIAGNOSIS — G43.101 MIGRAINE WITH AURA AND WITH STATUS MIGRAINOSUS, NOT INTRACTABLE: Primary | ICD-10-CM

## 2018-11-19 PROCEDURE — 99213 OFFICE O/P EST LOW 20 MIN: CPT | Performed by: FAMILY MEDICINE

## 2018-11-19 RX ORDER — KETOROLAC TROMETHAMINE 30 MG/ML
60 INJECTION, SOLUTION INTRAMUSCULAR; INTRAVENOUS ONCE
Status: COMPLETED | OUTPATIENT
Start: 2018-11-19 | End: 2018-11-19

## 2018-11-19 RX ORDER — PROMETHAZINE HYDROCHLORIDE 25 MG/ML
25 INJECTION, SOLUTION INTRAMUSCULAR; INTRAVENOUS ONCE
Status: DISCONTINUED | OUTPATIENT
Start: 2018-11-19 | End: 2018-11-19

## 2018-11-19 RX ADMIN — KETOROLAC TROMETHAMINE 60 MG: 30 INJECTION, SOLUTION INTRAMUSCULAR; INTRAVENOUS at 10:22

## 2018-11-19 NOTE — PROGRESS NOTES
Assessment/Plan:  Patient had injection of Toradol and Phenergan here in the office  Patient is not driving  Patient will stay at four hours A day  Will re-evaluate in 2 months     Diagnoses and all orders for this visit:    Migraine with aura and with status migrainosus, not intractable  -     ketorolac (TORADOL) 60 mg/2 mL IM injection 60 mg; Inject 2 mL (60 mg total) into a muscle once   -     promethazine (PHENERGAN) injection 25 mg; Inject 1 mL (25 mg total) into a muscle once           Subjective:      Patient ID: Kalen Espinoza is a 33 y o  female  Patient here to follow-up on papers for work  Patient working 4 hours a day  Patient does not feel she is capable of increasing hours presently  Patient also with migraine presently  This been going on for roughly 48 hours  Patient use two migraine pills  Medication Refill   Associated symptoms include headaches  Migraine          The following portions of the patient's history were reviewed and updated as appropriate: allergies, current medications, past family history, past medical history, past social history, past surgical history and problem list     Review of Systems   Constitutional: Negative  HENT: Negative  Eyes: Negative  Respiratory: Negative  Cardiovascular: Negative  Gastrointestinal: Negative  Endocrine: Negative  Genitourinary: Negative  Musculoskeletal: Negative  Skin: Negative  Allergic/Immunologic: Negative  Neurological: Positive for headaches  Hematological: Negative  Psychiatric/Behavioral: Negative  Objective:      /82 (BP Location: Right arm)   Temp 99 9 °F (37 7 °C)   Ht 5' 7" (1 702 m)   Wt 80 3 kg (177 lb)   BMI 27 72 kg/m²          Physical Exam   Constitutional: She is oriented to person, place, and time  She appears well-developed and well-nourished  No distress  HENT:   Head: Normocephalic     Right Ear: External ear normal    Left Ear: External ear normal  Mouth/Throat: Oropharynx is clear and moist  No oropharyngeal exudate  Eyes: Pupils are equal, round, and reactive to light  EOM are normal  Right eye exhibits no discharge  Left eye exhibits no discharge  No scleral icterus  Neck: Normal range of motion  Neck supple  No thyromegaly present  Cardiovascular: Normal rate, regular rhythm, normal heart sounds and intact distal pulses  Exam reveals no gallop and no friction rub  No murmur heard  Pulmonary/Chest: Effort normal and breath sounds normal  No respiratory distress  She has no wheezes  She has no rales  She exhibits no tenderness  Abdominal: Soft  Bowel sounds are normal  She exhibits no distension  There is no tenderness  There is no rebound and no guarding  Musculoskeletal: Normal range of motion  She exhibits tenderness  She exhibits no edema  Lymphadenopathy:     She has no cervical adenopathy  Neurological: She is oriented to person, place, and time  No cranial nerve deficit  She exhibits normal muscle tone  Coordination normal    Skin: Skin is warm and dry  No rash noted  She is not diaphoretic  No erythema  No pallor  Psychiatric: She has a normal mood and affect  Her behavior is normal  Judgment and thought content normal    Nursing note and vitals reviewed

## 2018-11-20 ENCOUNTER — OFFICE VISIT (OUTPATIENT)
Dept: NEUROLOGY | Facility: CLINIC | Age: 28
End: 2018-11-20
Payer: COMMERCIAL

## 2018-11-20 VITALS
BODY MASS INDEX: 27.89 KG/M2 | SYSTOLIC BLOOD PRESSURE: 118 MMHG | WEIGHT: 177.7 LBS | DIASTOLIC BLOOD PRESSURE: 80 MMHG | HEIGHT: 67 IN

## 2018-11-20 DIAGNOSIS — G43.709 CHRONIC MIGRAINE WITHOUT AURA WITHOUT STATUS MIGRAINOSUS, NOT INTRACTABLE: Primary | ICD-10-CM

## 2018-11-20 PROCEDURE — 99213 OFFICE O/P EST LOW 20 MIN: CPT | Performed by: PSYCHIATRY & NEUROLOGY

## 2018-11-20 NOTE — PROGRESS NOTES
Assessment/Plan:    No problem-specific Assessment & Plan notes found for this encounter  There are no diagnoses linked to this encounter  Subjective:     Patient ID: Freda Mo is a 33 y o  female        The following portions of the patient's history were reviewed and updated as appropriate: allergies, current medications, past family history, past medical history, past social history, past surgical history and problem list       Objective:      /80 (BP Location: Left arm, Patient Position: Sitting, Cuff Size: Standard)   Ht 5' 7" (1 702 m)   Wt 80 6 kg (177 lb 11 2 oz)   BMI 27 83 kg/m²         Physical Exam    Neurological Exam    Review of Systems

## 2018-11-20 NOTE — PATIENT INSTRUCTIONS
Migraine headaches:  Bel presents for follow-up with regard to her prior migraine headaches  She reports that her headaches overall have significantly improved since beginning her current combination of Trokendi and Botox  She is currently experiencing only 2-3 breakthrough migraines per month which are treated effectively with her current combination of abortive medications  She has started to experience paresthesias affecting her bilateral lower extremities in the last several weeks  -for ongoing migraine prevention we will plan to continue her current regimen of Botox and Trokendi  -in terms of treating her paresthesias I would suggest that she should be drinking at least 40-64 oz of non caffeinated beverages on a daily basis  She should keep track of this for at least 1-2 weeks to develop it as a habit  She should also increase her dietary potassium intake (citrus, bananas, avocados, green leafy vegetables)  If that fails to resolve her symptoms it may not be unreasonable to consider potassium supplementation in the future   -we did have a conversation in the office today with regard to the importance of family planning   -provided that she has, in the future, increased breakthrough migraines I would not be opposed to a trial of Aimovig    Will plan for to return to the office in 6 months time but I would like her to contact our office in no more than 4 weeks to report on her progress  If she does need to be seen sooner in the office we would be happy to do so

## 2018-11-20 NOTE — PROGRESS NOTES
Patient ID: Catrina Henriquez is a 33 y o  female  Assessment/Plan:    No problem-specific Assessment & Plan notes found for this encounter  Diagnoses and all orders for this visit:    Chronic migraine without aura without status migrainosus, not intractable       Patient Instructions   Migraine headaches:  Ventolin presents for follow-up with regard to her prior migraine headaches  She reports that her headaches overall have significantly improved since beginning her current combination of Trokendi and Botox  She is currently experiencing only 2-3 breakthrough migraines per month which are treated effectively with her current combination of abortive medications  She has started to experience paresthesias affecting her bilateral lower extremities in the last several weeks  -for ongoing migraine prevention we will plan to continue her current regimen of Botox and Trokendi  -in terms of treating her paresthesias I would suggest that she should be drinking at least 40-64 oz of non caffeinated beverages on a daily basis  She should keep track of this for at least 1-2 weeks to develop it as a habit  She should also increase her dietary potassium intake (citrus, bananas, avocados, green leafy vegetables)  If that fails to resolve her symptoms it may not be unreasonable to consider potassium supplementation in the future   -we did have a conversation in the office today with regard to the importance of family planning   -provided that she has, in the future, increased breakthrough migraines I would not be opposed to a trial of Aimovig    Will plan for to return to the office in 6 months time but I would like her to contact our office in no more than 4 weeks to report on her progress  If she does need to be seen sooner in the office we would be happy to do so  Subjective:    JOVANY Michael   Presents with her mother for follow-up with regard to her prior migraines    She reports ongoing significant benefit from her combination of long acting topiramate and Botox injections  She is currently having 2-3 breakthrough migraine days per month  Her breakthrough migraines are treated with a combination of Imitrex, injectable Toradol, and Compazine  She reports that this combination is quite effective  She reports the onset of paresthesias of the bilateral lower extremities  This has been going on for the last few weeks  At 1st it was just at night and now it is all the time  She notes that it begins more proximally at the level of the hip and then descends down the leg  She clarifies that it is a paresthesia but not as severe as a "pins and needles "   She denies any weakness or bowel or bladder changes  She notes that she was transitioned to Abilify after the symptoms began, and has been placed on Lyrica ( low dose) in an attempt to treat  She confirms that her water intake is not very good during the day  We did have a discussion in the office with regard to the importance of family planning and that in the future she may want to consider taking a multivitamin containing extra folic acid or direct folate supplementation as she is of childbearing age and will be getting  in the foreseeable future  We also discussed that an option might be to consider Aimovig  In the future if she has ongoing breakthrough migraines, especially if her migraines become more frequent or more refractory to treatment  Objective:    Blood pressure 118/80, height 5' 7" (1 702 m), weight 80 6 kg (177 lb 11 2 oz)  Physical Exam    Neurological Exam      At the time of my evaluation she was awake, alert, and in no distress  There were no obvious cranial neuropathies or lateralizing weakness  Sensation was intact to temperature and vibration in the bilateral lower extremities at least at the level of the ankle    Deep tendon reflexes were  2+ at the right patella and 1+ at the left patella and bilateral Achilles  Her gait was stable  ROS:    Review of Systems   Constitutional: Positive for fever  Negative for appetite change  HENT: Negative  Negative for hearing loss, tinnitus, trouble swallowing and voice change  Recently had strep throat     Eyes: Positive for pain  Negative for photophobia  Brightness bothers her    Respiratory: Negative  Negative for shortness of breath  Cardiovascular: Negative  Negative for palpitations  Gastrointestinal: Positive for constipation and nausea  Negative for vomiting  Endocrine: Positive for heat intolerance  Negative for cold intolerance  Genitourinary: Positive for frequency  Negative for dysuria and urgency  Musculoskeletal: Negative  Negative for myalgias and neck pain  Sore muscles   Skin: Negative  Negative for rash  Neurological: Positive for dizziness, tremors, light-headedness (if really bad headache), numbness and headaches  Negative for seizures, syncope, facial asymmetry, speech difficulty and weakness  Numbness and tingling constant in legs (thigh area)   Hematological: Bruises/bleeds easily  Psychiatric/Behavioral: Positive for confusion and sleep disturbance  Negative for hallucinations

## 2018-11-21 ENCOUNTER — TELEPHONE (OUTPATIENT)
Dept: FAMILY MEDICINE CLINIC | Facility: CLINIC | Age: 28
End: 2018-11-21

## 2018-11-27 ENCOUNTER — OFFICE VISIT (OUTPATIENT)
Dept: SLEEP CENTER | Facility: CLINIC | Age: 28
End: 2018-11-27
Payer: COMMERCIAL

## 2018-11-27 VITALS
BODY MASS INDEX: 31.36 KG/M2 | HEART RATE: 74 BPM | SYSTOLIC BLOOD PRESSURE: 114 MMHG | DIASTOLIC BLOOD PRESSURE: 76 MMHG | HEIGHT: 63 IN | WEIGHT: 177 LBS

## 2018-11-27 DIAGNOSIS — M79.7 FIBROMYALGIA SYNDROME: ICD-10-CM

## 2018-11-27 DIAGNOSIS — G47.9 SLEEP DISTURBANCE: Primary | ICD-10-CM

## 2018-11-27 DIAGNOSIS — F41.1 GENERALIZED ANXIETY DISORDER: ICD-10-CM

## 2018-11-27 DIAGNOSIS — E66.9 OBESITY (BMI 30-39.9): ICD-10-CM

## 2018-11-27 DIAGNOSIS — G43.709 CHRONIC MIGRAINE WITHOUT AURA WITHOUT STATUS MIGRAINOSUS, NOT INTRACTABLE: ICD-10-CM

## 2018-11-27 DIAGNOSIS — F32.0 CURRENT MILD EPISODE OF MAJOR DEPRESSIVE DISORDER WITHOUT PRIOR EPISODE (HCC): ICD-10-CM

## 2018-11-27 DIAGNOSIS — J30.2 SEASONAL ALLERGIES: ICD-10-CM

## 2018-11-27 DIAGNOSIS — R53.82 CHRONIC FATIGUE: ICD-10-CM

## 2018-11-27 DIAGNOSIS — R06.83 SNORING: ICD-10-CM

## 2018-11-27 PROCEDURE — 99244 OFF/OP CNSLTJ NEW/EST MOD 40: CPT | Performed by: INTERNAL MEDICINE

## 2018-11-27 NOTE — PATIENT INSTRUCTIONS
What you can do to improve your sleep: (Sleep Hygiene) Basic rules for a good night's sleep    Create a regular sleep schedule  This will help you form a sleep routine  Keep a record of your sleep patterns, and any sleeping problems you have  Bring the record to follow-up visits with healthcare providers  Avoid prolonged use of light-emitting screens before bedtime or watching TV in bed  Avoid forcing sleep  Do not take naps  Naps could make it hard for you to fall asleep at bedtime  Deal with your worries before bedtime  Keep your bedroom cool, quiet, and dark  Turn on white noise, such as a fan, to help you relax  Do not use your bed for any activity that will keep you awake  Do not read, exercise, eat, or watch TV in your bedroom  Get up if you do not fall asleep within 20 minutes  Move to another room and do something relaxing until you become sleepy  Limit caffeine, alcohol, nicotine and food to earlier in the day  Only drink caffeine in the morning  Do not drink alcohol within 6 hours of bedtime  Do not eat a heavy meal right before you go to bed  Avoid smoking, especially in the evening  Exercise regularly  Daily exercise will help you sleep better  Do not exercise within 4 hours of bedtime  Stimulus control therapy rules  1  Go to bed only when sleepy  2  Do not watch television, read, eat, or worry while in bed  Use bed only for sleep and sex  3  Get out of bed if unable to fall asleep within 20 minutes and go to another room  Return to bed only when sleepy  Repeat this step as many times as necessary throughout the night  4  Set an alarm clock to wake up at a fixed time each morning, including weekends  5  Do not take a nap during the day  Data from: [address] [place] [address], [address] Nonpharmacologic treatments of insomnia  J Clin Psychiatry 4876; 53:37  Go to [employer] website for more information: Sleepeducation  org     Recommended Reading:  Book by authors Prabha Choudhary No More sleepless nights

## 2018-11-27 NOTE — PROGRESS NOTES
Consultation - [facility]  33 y o  female  MELI:[id number]  ISN:[id number]    Physician Requesting Consult: Julisa Peralta DO    Reason for Consult : At your kind request I saw this patient for initial sleep evaluation today  She is here for complaints of fatigue, insomnia and fibromyalgia  PFSH, Problem List, Medications & Allergies were reviewed in EMR  She  has a past medical history of Abnormal Pap smear of cervix; Fibromyalgia, primary; and Migraine  She has a current medication list which includes the following prescription(s): aripiprazole, b-d 3cc luer-ethan syr 25gx1", vitamin d, clonazepam, etonogestrel-ethinyl estradiol, ketorolac, naproxen, onabotulinumtoxin a, pregabalin, prochlorperazine, sertraline, sulfasalazine, sumatriptan, trokendi xr, and syringe 3cc/10ly9-7/4", and the following Facility-Administered Medications: cyanocobalamin and promethazine  HPI:  Symptoms started approximately 2 years ago  Her main concern at present is fatigue that has gotten worse over time  She has been snoring told she snores occasionally, it is not disturbing her others  There is no report of breathing difficulties during sleep  Restless Leg Syndrome: has suggestive symptoms Parasomnia activity:  She reports sleep talking but no other features reported Other Complaints:  Multiple - see ROS  Sleep Routine: Typical Bedtime:  9:00 p m  Gets OOB:  9:00 a m  TIB:12 hrs Estimated Maximinus[employer] hrs  Sleep latency:<  30 minutes Sleep Interruptions: frequent x/night 4-6 times and struggles to fall back asleep  Awakens: with the aid of an alarm feeling never rested  She awakens with headache around 2 times a week  She has Excessive Daytime Sleepiness, yawns excessively, feels like napping but is not dozing off inadvertently  [place] Sleepiness Scale rated at Total score: 11 /24  Habits: reports that she has never smoked   She has never used smokeless tobacco , reports that she does not drink alcohol ,  reports that she does not use drugs  ,Caffeine use: limited , Exercise routine: none   Family History: Mother has insomnia  ROS: reviewed & as attached  Significant for weight fluctuates in the range of around 25 lb  She has ongoing symptoms of anxiety and depression in spite of current medication  She reports neuro cognitive difficulties but denied racing thoughts  She has nasal symptoms due to seasonal allergies  She has musculoskeletal aches and pains and radicular symptoms  She sweats excessively during sleep  EXAM:    Vitals /76   Pulse 74   Ht 5' 3" (1 6 m)   Wt 80 3 kg (177 lb)   BMI 31 35 kg/m²     General  Well groomed female, appears stated age, in no apparent distress  Psychiatric  Alert and cooperative  Depressed and has a constricted affect    Head   Craniofacial anatomy:normal Sinuses: non- tender  TMJ: Normal     Eyes   EOM's intact, conjunctiva/corneas clear         Nasal Airway  is patent and narrow nares Septum:central, Mucous membranes:appear normal     Turbinates:  are normal  There is no rhinorrhea; No PND     Oral   Airway   crowded Tongue:Modified Mallampati class IV (only hard palate visible)  Palate:  redundant soft palateTonsils: no hypertrophy  Teeth: normal       Neck    appears thick and there's extra fatty tissue; Neck Circumference: 33cm; Supple; no abnormal masses; Thyroid:normal  Trachea:central      Lymph    No Cervical or Submandibular Lymhadenopathy   Heart:    RRR; S1,S2 normal; no gallop; nomurmurs     Lungs   Respiratory Effort:normal  Air entry good bilaterally  No wheezes  No rales   Abdomen   Obese, Soft & non-tender     Extremities   No pedal edema  No clubbing or cyanosis  Skin   Skin is warm and dry; Color& Hydration good; no facial rashes or lesions    Neurologic  Speech is clear and coherent  CNII-XII intact  Rombergs Negative      Muscskeltl    Muscle bulk, tone and power WNL Gait:normal          IMPRESSION: Primary Sleep/Secondary(to Medical or Psych conditions) & comorbidities   1  Sleep disturbance     2  Snoring     3  Fibromyalgia syndrome     4  Chronic fatigue     5  Generalized anxiety disorder     6  Current mild episode of major depressive disorder without prior episode ([address] )     7  Seasonal allergies     8  Chronic migraine without aura without status migrainosus, not intractable     9  Obesity (BMI 30-39  9)          PLAN:   1  Comprehensive counseling was provided on pathophysiology, diagnostic strategies & treatment options; effects on symptoms and comorbidities; risks of inadequate therapy; costs and insurance aspects  2   Cognitive behavioral therapy was initiated, Sleep Hygiene and behavioral techniques to manage Insomnia were discussed  Specifically, limiting time in bed to 7-1/2 hours or less, starting an exercise routine and on relaxation techniques  3  Educational materials were provided and I advised reading "No more sleepless nights" by authors [facility]  4   Patient was instructed to keep a sleep log  5   I also advised minimizing use of sedating medications  6   If symptoms persist in spite of the above strategies, a diagnostic sleep study may be considered  7   Follow-up will be scheduled in 6-8 weeks to monitor progress, further details of treatment options and to initiate/adjust therapy  Thank you for allowing me to participate in the care of this patient  I will keep you apprised of developments      Sincerely,     Authenticated electronically by Oralia Haywood MD   on 98/45/24   Board Certified Specialist

## 2018-11-27 NOTE — PROGRESS NOTES
Review of Systems      Genitourinary hot flashes at night   Cardiology ankle/leg swelling   Gastrointestinal none   Neurology frequent headaches, awaken with headache, need to move extremities, numbness/tingling of an extremity, forgetfulness, poor concentration or confusion, , difficulty with memory and balance problems   Constitutional fatigue, excessive sweating at night and weight change   Integumentary itching   Psychiatry anxiety, depression, aggressiveness or irritability and mood change   Musculoskeletal joint pain, muscle aches, back pain, legs twitching/jerking, sciatica and leg cramps   Pulmonary snoring   ENT none   Endocrine none   Hematological none

## 2018-11-28 ENCOUNTER — DOCUMENTATION (OUTPATIENT)
Dept: NEUROLOGY | Facility: CLINIC | Age: 28
End: 2018-11-28

## 2018-11-28 NOTE — PROGRESS NOTES
Per Sweta Baker- please use Botox 200 units- our stock for patient's upcoming appointment on 12/21/18

## 2018-12-04 DIAGNOSIS — F41.0 PANIC ATTACKS: ICD-10-CM

## 2018-12-04 RX ORDER — CLONAZEPAM 1 MG/1
1 TABLET ORAL DAILY
Qty: 30 TABLET | Refills: 0 | Status: SHIPPED | OUTPATIENT
Start: 2018-12-04 | End: 2019-04-24 | Stop reason: SDUPTHER

## 2018-12-11 DIAGNOSIS — G44.59 OTHER COMPLICATED HEADACHE SYNDROME: ICD-10-CM

## 2018-12-11 RX ORDER — SUMATRIPTAN 100 MG/1
100 TABLET, FILM COATED ORAL ONCE AS NEEDED
Qty: 10 TABLET | Refills: 1 | Status: SHIPPED | OUTPATIENT
Start: 2018-12-11 | End: 2019-01-28 | Stop reason: SDUPTHER

## 2018-12-21 ENCOUNTER — PROCEDURE VISIT (OUTPATIENT)
Dept: NEUROLOGY | Facility: CLINIC | Age: 28
End: 2018-12-21
Payer: COMMERCIAL

## 2018-12-21 ENCOUNTER — TELEPHONE (OUTPATIENT)
Dept: NEUROLOGY | Facility: CLINIC | Age: 28
End: 2018-12-21

## 2018-12-21 VITALS — SYSTOLIC BLOOD PRESSURE: 120 MMHG | TEMPERATURE: 98.8 F | DIASTOLIC BLOOD PRESSURE: 80 MMHG

## 2018-12-21 DIAGNOSIS — G43.709 CHRONIC MIGRAINE WITHOUT AURA WITHOUT STATUS MIGRAINOSUS, NOT INTRACTABLE: Primary | ICD-10-CM

## 2018-12-21 PROCEDURE — 64615 CHEMODENERV MUSC MIGRAINE: CPT | Performed by: PHYSICIAN ASSISTANT

## 2018-12-21 NOTE — PROGRESS NOTES
Chemodenervation  Date/Time: 12/21/2018 9:27 AM  Performed by: Serafin Hoffmann  Authorized by: Irasema Davison details:     Prepped With: Alcohol    Procedure details:     Position:  Upright  Botox:     Botox Type:  Type A    Brand:  Botox    mL's of Botulinum Toxin:  155    Final Concentration per CC:  50 units    Needle Gauge:  30 G 2 5 inch  Procedures:     Botox Procedures: chronic headache      Indications: migraines    Injection Location:     Head / Face:  L , R , L frontalis, R frontalis, R inferior cervical paraspinal, L inferior cervical paraspinal, L medial occipitalis, R medial occipitalis, L lateral occipitalis, R lateral occipitalis, procerus, L temporalis, R temporalis, R superior trapezius and L superior trapezius    L  injection amount:  5 unit(s)    R  injection amount:  5 unit(s)    L lateral frontalis:  5 unit(s)    R lateral frontalis:  5 unit(s)    L medial frontalis:  5 unit(s)    R medial frontalis:  5 unit(s)    L temporalis injection amount:  20 unit(s)    R temporalis injection amount:  20 unit(s)    Procerus injection amount:  5 unit(s)    L lateral occipitalis injection amount:  5 unit(s)    R lateral occipitalis injection amount:  5 unit(s)    L medial occipitalis injection amount:  10 unit(s)    R medial occipitalis injection amount:  10 unit(s)    L inferior cervical paraspinal injection amount:  10 unit(s)    R inferior cervical paraspinal injection amount:  10 unit(s)    L superior trapezius injection amount:  0 unit(s)    R superior trapezius injection amount:  0 unit(s)  Total Units:     Total units used:  155    Total units discarded:  45  Post-procedure details:     Chemodenervation:  Chronic migraine    Facial Nerve Location[de-identified]  Bilateral facial nerve    Patient tolerance of procedure:   Tolerated well, no immediate complications  Comments:      I avoided the trap muscles b/l per pt request  Extra 30 units in the b/l temporoparietal region and scalp (headband region), medically necessary          Vitals:    12/21/18 0927   BP: 120/80   Temp: 98 8 °F (37 1 °C)

## 2018-12-21 NOTE — TELEPHONE ENCOUNTER
called patient twice  I was informed to call patient and reschedule appt due to lakesha being sick  while speaking to patient i was then told patient can still come in  patient is on the way   I apologized for the MIX UP

## 2019-01-03 ENCOUNTER — TELEPHONE (OUTPATIENT)
Dept: NEUROLOGY | Facility: CLINIC | Age: 29
End: 2019-01-03

## 2019-01-03 DIAGNOSIS — G43.709 CHRONIC MIGRAINE WITHOUT AURA WITHOUT STATUS MIGRAINOSUS, NOT INTRACTABLE: Primary | ICD-10-CM

## 2019-01-03 RX ORDER — TOPIRAMATE 100 MG/1
100 CAPSULE, EXTENDED RELEASE ORAL DAILY
Qty: 30 CAPSULE | Refills: 5 | Status: SHIPPED | OUTPATIENT
Start: 2019-01-03 | End: 2019-07-18 | Stop reason: SDUPTHER

## 2019-01-03 NOTE — TELEPHONE ENCOUNTER
Patient's mother stated Manjinder has requested records from us  I informed her that I do not see anything from them for neurology, I did see requests sent to the PCP       Mother stated they will stop into one of the neuro offices to fill out a HANK so her records can be sent to [address] [place] [address]

## 2019-01-10 DIAGNOSIS — G43.709 CHRONIC MIGRAINE WITHOUT AURA WITHOUT STATUS MIGRAINOSUS, NOT INTRACTABLE: ICD-10-CM

## 2019-01-11 ENCOUNTER — OFFICE VISIT (OUTPATIENT)
Dept: FAMILY MEDICINE CLINIC | Facility: CLINIC | Age: 29
End: 2019-01-11
Payer: COMMERCIAL

## 2019-01-11 VITALS
DIASTOLIC BLOOD PRESSURE: 80 MMHG | BODY MASS INDEX: 31.18 KG/M2 | WEIGHT: 176 LBS | HEIGHT: 63 IN | TEMPERATURE: 98.5 F | SYSTOLIC BLOOD PRESSURE: 108 MMHG

## 2019-01-11 DIAGNOSIS — G43.119 INTRACTABLE MIGRAINE WITH AURA WITHOUT STATUS MIGRAINOSUS: Primary | ICD-10-CM

## 2019-01-11 PROCEDURE — 99213 OFFICE O/P EST LOW 20 MIN: CPT | Performed by: FAMILY MEDICINE

## 2019-01-11 RX ORDER — KETOROLAC TROMETHAMINE 30 MG/ML
60 INJECTION, SOLUTION INTRAMUSCULAR; INTRAVENOUS ONCE
Status: COMPLETED | OUTPATIENT
Start: 2019-01-11 | End: 2019-01-11

## 2019-01-11 RX ORDER — KETOROLAC TROMETHAMINE 30 MG/ML
INJECTION, SOLUTION INTRAMUSCULAR; INTRAVENOUS
Qty: 4 ML | Refills: 1 | Status: SHIPPED | OUTPATIENT
Start: 2019-01-11 | End: 2019-06-24 | Stop reason: SDUPTHER

## 2019-01-11 RX ORDER — SYRINGE W-NEEDLE,DISPOSAB,3 ML 25GX5/8"
SYRINGE, EMPTY DISPOSABLE MISCELLANEOUS
Qty: 4 EACH | Refills: 0 | Status: SHIPPED | OUTPATIENT
Start: 2019-01-11 | End: 2019-06-24 | Stop reason: SDUPTHER

## 2019-01-11 RX ORDER — PROMETHAZINE HYDROCHLORIDE 25 MG/ML
25 INJECTION, SOLUTION INTRAMUSCULAR; INTRAVENOUS ONCE
Status: DISCONTINUED | OUTPATIENT
Start: 2019-01-11 | End: 2019-01-11

## 2019-01-11 RX ADMIN — PROMETHAZINE HYDROCHLORIDE 25 MG: 25 INJECTION, SOLUTION INTRAMUSCULAR; INTRAVENOUS at 15:21

## 2019-01-11 RX ADMIN — KETOROLAC TROMETHAMINE 60 MG: 30 INJECTION, SOLUTION INTRAMUSCULAR; INTRAVENOUS at 15:22

## 2019-01-11 NOTE — PROGRESS NOTES
Assessment/Plan:     Diagnoses and all orders for this visit:    Intractable migraine with aura without status migrainosus  -     ketorolac (TORADOL) 60 mg/2 mL IM injection 60 mg; Inject 2 mL (60 mg total) into a muscle once   -     promethazine (PHENERGAN) injection 25 mg; Inject 1 mL (25 mg total) into a muscle once           Subjective:      Patient ID: Tasha Canas is a 33 y o  female  Patient is here for migraine over the past 3 days  This is waxing waning but persisting  No new symptoms  Patient does notice photophobia as well as nausea  Migraine          The following portions of the patient's history were reviewed and updated as appropriate: allergies, current medications, past family history, past medical history, past social history, past surgical history and problem list     Review of Systems   Constitutional: Negative  HENT: Negative  Eyes: Negative  Respiratory: Negative  Cardiovascular: Negative  Gastrointestinal: Negative  Endocrine: Negative  Genitourinary: Negative  Musculoskeletal: Negative  Skin: Negative  Allergic/Immunologic: Negative  Neurological: Positive for headaches  Hematological: Negative  Psychiatric/Behavioral: Negative  Objective:      /80 (BP Location: Right arm, Patient Position: Sitting)   Temp 98 5 °F (36 9 °C)   Ht 5' 3" (1 6 m)   Wt 79 8 kg (176 lb)   BMI 31 18 kg/m²          Physical Exam   Constitutional: She appears well-developed and well-nourished  No distress  HENT:   Head: Normocephalic  Right Ear: External ear normal    Left Ear: External ear normal    Mouth/Throat: Oropharynx is clear and moist  No oropharyngeal exudate  Eyes: Pupils are equal, round, and reactive to light  EOM are normal  Right eye exhibits no discharge  Left eye exhibits no discharge  No scleral icterus  Neck: Normal range of motion  Neck supple  No thyromegaly present     Cardiovascular: Normal rate, regular rhythm, normal heart sounds and intact distal pulses  Exam reveals no gallop and no friction rub  No murmur heard  Pulmonary/Chest: Effort normal and breath sounds normal  No respiratory distress  She has no wheezes  She has no rales  She exhibits no tenderness  Musculoskeletal: Normal range of motion  She exhibits no edema or tenderness  Lymphadenopathy:     She has no cervical adenopathy  Neurological: She is alert  She exhibits normal muscle tone  Coordination normal    Skin: Skin is warm and dry  No rash noted  She is not diaphoretic  No erythema  No pallor  Psychiatric: She has a normal mood and affect  Her behavior is normal  Judgment and thought content normal    Nursing note and vitals reviewed

## 2019-01-21 ENCOUNTER — TELEPHONE (OUTPATIENT)
Dept: NEUROLOGY | Facility: CLINIC | Age: 29
End: 2019-01-21

## 2019-01-21 NOTE — TELEPHONE ENCOUNTER
Received fax from [facility] that botox is not covered  I will scan to CF & to Chaim Balbuena @this time

## 2019-01-22 ENCOUNTER — OFFICE VISIT (OUTPATIENT)
Dept: FAMILY MEDICINE CLINIC | Facility: CLINIC | Age: 29
End: 2019-01-22
Payer: COMMERCIAL

## 2019-01-22 VITALS
DIASTOLIC BLOOD PRESSURE: 74 MMHG | BODY MASS INDEX: 32.04 KG/M2 | HEART RATE: 94 BPM | RESPIRATION RATE: 12 BRPM | OXYGEN SATURATION: 97 % | SYSTOLIC BLOOD PRESSURE: 116 MMHG | HEIGHT: 63 IN | WEIGHT: 180.8 LBS

## 2019-01-22 DIAGNOSIS — F32.0 CURRENT MILD EPISODE OF MAJOR DEPRESSIVE DISORDER WITHOUT PRIOR EPISODE (HCC): ICD-10-CM

## 2019-01-22 DIAGNOSIS — F41.1 GENERALIZED ANXIETY DISORDER: Primary | ICD-10-CM

## 2019-01-22 PROCEDURE — 99213 OFFICE O/P EST LOW 20 MIN: CPT | Performed by: FAMILY MEDICINE

## 2019-01-22 RX ORDER — BUPROPION HYDROCHLORIDE 150 MG/1
150 TABLET ORAL DAILY
Qty: 30 TABLET | Refills: 2 | Status: SHIPPED | OUTPATIENT
Start: 2019-01-22 | End: 2019-05-06 | Stop reason: SDUPTHER

## 2019-01-22 RX ORDER — LORAZEPAM 0.5 MG/1
0.5 TABLET ORAL EVERY 8 HOURS PRN
Qty: 30 TABLET | Refills: 0 | Status: SHIPPED | OUTPATIENT
Start: 2019-01-22 | End: 2019-03-05 | Stop reason: SDUPTHER

## 2019-01-22 NOTE — PROGRESS NOTES
Assessment/Plan:  Note for jury duty done at this time  Will add Wellbutrin at this time  Patient will continue with counseling  Patient use lorazepam as needed for panic attacks  Follow-up in 6 weeks     Diagnoses and all orders for this visit:    Generalized anxiety disorder  -     buPROPion (WELLBUTRIN XL) 150 mg 24 hr tablet; Take 1 tablet (150 mg total) by mouth daily  -     LORazepam (ATIVAN) 0 5 mg tablet; Take 1 tablet (0 5 mg total) by mouth every 8 (eight) hours as needed for anxiety    Current mild episode of major depressive disorder without prior episode (HCC)          Subjective:      Patient ID: Maribel Yanes is a 33 y o  female  Patient is here with increased anxiety recently over the past month  Patient has some stressors including relationship issues as well as jury duty and other things  Patient is still waiting on Disability determination  Patient does get some chest pain shortness of breath with anxiety  No difficulty with urination or defecation  The patient has been terminated from work due to medical conditions  Patient does have sleep disturbance  No suicidal ideation  Patient is going to counseling        The following portions of the patient's history were reviewed and updated as appropriate: allergies, current medications, past family history, past medical history, past social history, past surgical history and problem list     Review of Systems   Constitutional: Negative  HENT: Negative  Eyes: Negative  Respiratory: Negative  Cardiovascular: Negative  Gastrointestinal: Negative  Endocrine: Negative  Genitourinary: Negative  Musculoskeletal: Negative  Skin: Negative  Allergic/Immunologic: Negative  Neurological: Negative  Hematological: Negative  Psychiatric/Behavioral: Positive for sleep disturbance  Negative for self-injury and suicidal ideas  The patient is nervous/anxious            Objective:      /74 (BP Location: Left arm, Patient Position: Sitting, Cuff Size: Adult)   Pulse 94   Resp 12   Ht 5' 3" (1 6 m)   Wt 82 kg (180 lb 12 8 oz)   SpO2 97%   BMI 32 03 kg/m²          Physical Exam

## 2019-01-23 ENCOUNTER — PATIENT MESSAGE (OUTPATIENT)
Dept: NEUROLOGY | Facility: CLINIC | Age: 29
End: 2019-01-23

## 2019-01-23 ENCOUNTER — TELEPHONE (OUTPATIENT)
Dept: NEUROLOGY | Facility: CLINIC | Age: 29
End: 2019-01-23

## 2019-01-23 NOTE — TELEPHONE ENCOUNTER
Pt sent a my chart message  See other encounter from todays date  I did make pt aware that Cornell Lemons is looking into letter in regards to botox that came from her insurance  Also, I looked in patient chart and found [employer] forms she was referencing  I did fax over records as per the request of the signed release from 1/1/18- present of all office and procedure notes  The form is a disability form questioning pt restrictions, limitations etc  I did ask pt who took her out of work  She states she has been out of work since 12/14/18 and states her employer put her out on an administrative leave  She states they said she can come back when she is better  I asked in regards to what and she said her fibromyalgia, migraines and other health conditions  I did make pt aware we would only complete in regards to migraines  Please advise if you feel due to migraines, pt is limited in employment? Or if you have given her any restrictions? What is her prognosis? Pt has already paid fee for forms  Forms can be found in clinical bin and pt is aware we do not complete the functional capacity section

## 2019-01-28 DIAGNOSIS — Z30.44 ENCOUNTER FOR SURVEILLANCE OF VAGINAL RING HORMONAL CONTRACEPTIVE DEVICE: ICD-10-CM

## 2019-01-28 DIAGNOSIS — G44.59 OTHER COMPLICATED HEADACHE SYNDROME: ICD-10-CM

## 2019-01-28 RX ORDER — SUMATRIPTAN 100 MG/1
100 TABLET, FILM COATED ORAL ONCE AS NEEDED
Qty: 10 TABLET | Refills: 1 | Status: SHIPPED | OUTPATIENT
Start: 2019-01-28 | End: 2019-03-28 | Stop reason: SDUPTHER

## 2019-01-28 NOTE — TELEPHONE ENCOUNTER
I did not hear back from her  Please call her and ask her if she needs these forms completed for her migraines; if yes then please complete or I can complete  Thanks

## 2019-01-28 NOTE — TELEPHONE ENCOUNTER
Medical records request from [facility] again faxed to [facility] at this time  Jeanmarie Conway states that Brenda Barnhart will call patient's mother to provide contact number for [employer] as well as to clarify if patient is requesting forms be completed for migraine

## 2019-01-28 NOTE — TELEPHONE ENCOUNTER
Christin Jernigan did you receive a response from the patient, I am attempting to complete the forms

## 2019-01-28 NOTE — TELEPHONE ENCOUNTER
Spoke to patient's mother, Katelynn Scott  She states there has been multiple attempts by [employer] as well as a third party company to receive her daughter's records and is frustrated that "all of her other doctors' offices has sent them over and [facility] stating if they don't receive records within the next 3 days, they will evaluate the claim without the Neurology records"  I advised that as per protocol the request for records has been faxed to [facility]  Katelynn Scott also states that she was told that "once she pays the fee that the records would be there in 3 days"  She is unsure who she spoke to  I advised that the fee paid was for the [facility] and not the release for records  I went on to ask if there was still a need for the completion of these forms for Brandalyn's migraines  The patient's mother states "no there isn't but sure she they're already paid for"  As requested by the patient's mother, I provided the telephone number to [facility] via [employer]

## 2019-01-28 NOTE — TELEPHONE ENCOUNTER
[facility] called for Sumatriptan refill  Per pharmacist, patient last filled January 3rd  She states there are two more tablets on the prescription as the patients insurance only allows 9 tabs at a time  Order entered, please approve if appropriate 
Clothing

## 2019-01-29 RX ORDER — ETONOGESTREL AND ETHINYL ESTRADIOL 11.7; 2.7 MG/1; MG/1
INSERT, EXTENDED RELEASE VAGINAL
Qty: 3 EACH | Refills: 3 | Status: SHIPPED | OUTPATIENT
Start: 2019-01-29 | End: 2019-06-20 | Stop reason: SDUPTHER

## 2019-01-29 NOTE — TELEPHONE ENCOUNTER
To clarify, are we completing the forms? It sounds like the mother said the patient didn't need them? Kareen Hong if you want to fill them out, the forms are available under media tab (dated 1/9/19)

## 2019-01-29 NOTE — TELEPHONE ENCOUNTER
I called the patient and spoke with her  She states that she did not hear from [employer] and does not know if forms were received from MR office  I told her to keep in touch with us about this  Clinical team- could you please complete paperwork for disability due to migraines  Thanks

## 2019-01-30 NOTE — TELEPHONE ENCOUNTER
Are there any restrictions and/or limitations or accommodations etc  They are requiring a specific and detailed explanation  Also, need a comment on patient's prognosis and expected duration  Thanks  (form is under media tab for reference)

## 2019-01-30 NOTE — TELEPHONE ENCOUNTER
Please state pt cannot work due to chronic, intractable and disabling symptoms ongoing for several years and still daily despite the use of multiple conservative treatments, medications and botox  She was asked not drive when she is having symptoms  Dx are chronic migraine without aura, fibromyalgia    Prognosis unknown at this time, likely indefinite disability status however may change depending on improvements she makes within the next year or so    Can attach previous notes and say "see attached "    Is this adequate info? Please let me know if more info needed  Thank you very much!

## 2019-01-31 DIAGNOSIS — M79.7 FIBROMYALGIA: ICD-10-CM

## 2019-01-31 NOTE — TELEPHONE ENCOUNTER
Forms emailed to [employer] to give to [facility] SYSTM CHRISTELLE [employer] UBALDO  Please have her sign and then fax to [employer]  Thank you

## 2019-02-01 RX ORDER — ARIPIPRAZOLE 2 MG/1
2 TABLET ORAL DAILY
Qty: 30 TABLET | Refills: 2 | Status: SHIPPED | OUTPATIENT
Start: 2019-02-01 | End: 2019-05-06 | Stop reason: SDUPTHER

## 2019-02-06 ENCOUNTER — TELEPHONE (OUTPATIENT)
Dept: NEUROLOGY | Facility: CLINIC | Age: 29
End: 2019-02-06

## 2019-02-06 NOTE — TELEPHONE ENCOUNTER
Received a fax from [employer] in regards to pt's inablity to work  This is a summary of pt's records that they received  Our office indicated that pt has no ability to work and upon their review they disagree  They are asking if you agree with their summary of capacity  Form placed in your folder for review

## 2019-02-14 NOTE — TELEPHONE ENCOUNTER
I entered a letter today in response to the query  I disagree, she is unable to work    Please send the letter with the origin form to Berto Bach

## 2019-02-28 ENCOUNTER — TELEPHONE (OUTPATIENT)
Dept: FAMILY MEDICINE CLINIC | Facility: CLINIC | Age: 29
End: 2019-02-28

## 2019-03-04 ENCOUNTER — TELEPHONE (OUTPATIENT)
Dept: FAMILY MEDICINE CLINIC | Facility: CLINIC | Age: 29
End: 2019-03-04

## 2019-03-04 NOTE — TELEPHONE ENCOUNTER
FYI: Pt's mother called stating [employer] denied her LTD  They are claiming based on the information they had received between all the dr's the only restriction she has is that she cannot drive  This is obviously not the case  Pt's mother described that she is hardly able to even get out of bed because of the depression and anxiety, along with her migraines (which she is giving herself shots at home for these)  She was fired from her job at [employer] because of her absences  **Pt has an appt with you on Tuesday 3/5 to discuss her condition(s)  Very specific information needs to be noted within the office note about her restrictions/limitations   The OV note along with a letter explaining this information will then be needed to [employer] **

## 2019-03-05 ENCOUNTER — OFFICE VISIT (OUTPATIENT)
Dept: FAMILY MEDICINE CLINIC | Facility: CLINIC | Age: 29
End: 2019-03-05
Payer: COMMERCIAL

## 2019-03-05 VITALS
BODY MASS INDEX: 32.07 KG/M2 | SYSTOLIC BLOOD PRESSURE: 126 MMHG | HEIGHT: 63 IN | WEIGHT: 181 LBS | TEMPERATURE: 99.1 F | DIASTOLIC BLOOD PRESSURE: 86 MMHG

## 2019-03-05 DIAGNOSIS — M79.7 FIBROMYALGIA: ICD-10-CM

## 2019-03-05 DIAGNOSIS — M79.7 FIBROMYALGIA SYNDROME: ICD-10-CM

## 2019-03-05 DIAGNOSIS — F41.1 GENERALIZED ANXIETY DISORDER: ICD-10-CM

## 2019-03-05 DIAGNOSIS — F06.4 ANXIETY DISORDER DUE TO KNOWN PHYSIOLOGICAL CONDITION: ICD-10-CM

## 2019-03-05 DIAGNOSIS — G43.709 CHRONIC MIGRAINE WITHOUT AURA WITHOUT STATUS MIGRAINOSUS, NOT INTRACTABLE: Primary | ICD-10-CM

## 2019-03-05 DIAGNOSIS — F32.1 CURRENT MODERATE EPISODE OF MAJOR DEPRESSIVE DISORDER WITHOUT PRIOR EPISODE (HCC): ICD-10-CM

## 2019-03-05 PROCEDURE — 99214 OFFICE O/P EST MOD 30 MIN: CPT | Performed by: FAMILY MEDICINE

## 2019-03-05 PROCEDURE — 1036F TOBACCO NON-USER: CPT | Performed by: FAMILY MEDICINE

## 2019-03-05 PROCEDURE — 3008F BODY MASS INDEX DOCD: CPT | Performed by: FAMILY MEDICINE

## 2019-03-05 RX ORDER — PREGABALIN 25 MG/1
25 CAPSULE ORAL 2 TIMES DAILY
Qty: 60 CAPSULE | Refills: 3 | Status: SHIPPED | OUTPATIENT
Start: 2019-03-05 | End: 2019-08-16 | Stop reason: SDUPTHER

## 2019-03-05 RX ORDER — NAPROXEN 500 MG/1
500 TABLET ORAL AS NEEDED
Qty: 60 TABLET | Refills: 1 | Status: SHIPPED | OUTPATIENT
Start: 2019-03-05 | End: 2020-11-11

## 2019-03-05 RX ORDER — LORAZEPAM 0.5 MG/1
0.5 TABLET ORAL EVERY 8 HOURS PRN
Qty: 30 TABLET | Refills: 0 | Status: SHIPPED | OUTPATIENT
Start: 2019-03-05 | End: 2019-07-01 | Stop reason: ALTCHOICE

## 2019-03-05 NOTE — PROGRESS NOTES
Assessment/Plan:  Patient with ongoing chronic migraines  Patient to see Neurology  Patient will continue to get Botox injections  Refills on medications for this  Patient also with ongoing fibromyalgia which is relatively unchanged  The patient will continue with Lyrica for this  Refills given  Patient's depression/anxiety slightly improved overall  Continue with current regimen  Patient will continue with counseling and will start yoga  Patient use Ativan as needed for panic attacks     Diagnoses and all orders for this visit:    Chronic migraine without aura without status migrainosus, not intractable  -     naproxen (NAPROSYN) 500 mg tablet; Take 1 tablet (500 mg total) by mouth as needed for headaches    Fibromyalgia  -     pregabalin (LYRICA) 25 mg capsule; Take 1 capsule (25 mg total) by mouth 2 (two) times a day    Anxiety disorder due to known physiological condition    Fibromyalgia syndrome    Current moderate episode of major depressive disorder without prior episode (HCC)    Generalized anxiety disorder  -     LORazepam (ATIVAN) 0 5 mg tablet; Take 1 tablet (0 5 mg total) by mouth every 8 (eight) hours as needed for anxiety          Subjective:      Patient ID: Talon Lester is a 33 y o  female  Patient follow-up on anxiety and depression  Patient is panic attacks have been reduced  Depression is improving overall  Patient got rid of 1 stressor  Patient is going to try yoga  Patient is seeing counselor  Patient working at this time  Patient with ongoing pain in the neck as well as ongoing headaches and back pain and some upper abdominal/rib pain  The following portions of the patient's history were reviewed and updated as appropriate: allergies, current medications, past family history, past medical history, past social history, past surgical history and problem list     Review of Systems   Constitutional: Negative  HENT: Negative  Eyes: Negative      Respiratory: Negative  Cardiovascular: Negative  Gastrointestinal: Negative  Endocrine: Negative  Genitourinary: Negative  Musculoskeletal: Positive for arthralgias, back pain, myalgias and neck pain  Skin: Negative  Allergic/Immunologic: Negative  Neurological: Positive for headaches  Hematological: Negative  Psychiatric/Behavioral: Positive for behavioral problems and sleep disturbance  The patient is nervous/anxious  Objective:      /86 (BP Location: Right arm, Patient Position: Sitting, Cuff Size: Adult)   Temp 99 1 °F (37 3 °C) (Tympanic)   Ht 5' 3" (1 6 m)   Wt 82 1 kg (181 lb)   BMI 32 06 kg/m²          Physical Exam   Constitutional: She is oriented to person, place, and time  She appears well-developed and well-nourished  No distress  HENT:   Head: Normocephalic  Right Ear: External ear normal    Left Ear: External ear normal    Mouth/Throat: Oropharynx is clear and moist  No oropharyngeal exudate  Eyes: Pupils are equal, round, and reactive to light  EOM are normal  Right eye exhibits no discharge  Left eye exhibits no discharge  No scleral icterus  Neck: Normal range of motion  Neck supple  No thyromegaly present  Cardiovascular: Normal rate, regular rhythm, normal heart sounds and intact distal pulses  Exam reveals no gallop and no friction rub  No murmur heard  Pulmonary/Chest: Effort normal and breath sounds normal  No respiratory distress  She has no wheezes  She has no rales  She exhibits no tenderness  Abdominal: Soft  Bowel sounds are normal  She exhibits no distension  There is no tenderness  There is no rebound and no guarding  Musculoskeletal: She exhibits tenderness  She exhibits no edema  Lymphadenopathy:     She has no cervical adenopathy  Neurological: She is oriented to person, place, and time  No cranial nerve deficit  She exhibits normal muscle tone  Coordination normal    Skin: Skin is warm and dry  No rash noted   She is not diaphoretic  No erythema  No pallor     Psychiatric: Her behavior is normal  Judgment and thought content normal    Mildly anxious

## 2019-03-06 ENCOUNTER — DOCUMENTATION (OUTPATIENT)
Dept: NEUROLOGY | Facility: CLINIC | Age: 29
End: 2019-03-06

## 2019-03-06 NOTE — PROGRESS NOTES
General 03/06/2019 11:07 AM Liliana Smith care coordination  -   Note    Botox- no authorization needed   Please use our stock        Thank you

## 2019-03-07 NOTE — TELEPHONE ENCOUNTER
It does not appear the OV note is specific as to what her restrictions are  Can you please advise and update the OV note before I send off the OV note and letter?

## 2019-03-21 ENCOUNTER — PROCEDURE VISIT (OUTPATIENT)
Dept: NEUROLOGY | Facility: CLINIC | Age: 29
End: 2019-03-21
Payer: COMMERCIAL

## 2019-03-21 VITALS — SYSTOLIC BLOOD PRESSURE: 121 MMHG | DIASTOLIC BLOOD PRESSURE: 88 MMHG | TEMPERATURE: 98.5 F | HEART RATE: 95 BPM

## 2019-03-21 DIAGNOSIS — G43.709 CHRONIC MIGRAINE WITHOUT AURA WITHOUT STATUS MIGRAINOSUS, NOT INTRACTABLE: Primary | ICD-10-CM

## 2019-03-21 PROCEDURE — 64615 CHEMODENERV MUSC MIGRAINE: CPT | Performed by: PHYSICIAN ASSISTANT

## 2019-03-21 NOTE — PROGRESS NOTES
Chemodenervation  Date/Time: 3/21/2019 8:21 AM  Performed by: Brian Roberto PA-C  Authorized by: Brian Roberto PA-C     Pre-procedure details:     Prepped With: Alcohol    Procedure details:     Position:  Upright  Botox:     Botox Type:  Type A    Brand:  Botox    mL's of Botulinum Toxin:  155    Final Concentration per CC:  50 units    Needle Gauge:  30 G 2 5 inch  Procedures:     Botox Procedures: chronic headache      Indications: migraines    Injection Location:     Head / Face:  L , R , L frontalis, R frontalis, R inferior cervical paraspinal, L inferior cervical paraspinal, L medial occipitalis, R medial occipitalis, L lateral occipitalis, R lateral occipitalis, procerus, L temporalis, R temporalis, R superior trapezius and L superior trapezius    L  injection amount:  5 unit(s)    R  injection amount:  5 unit(s)    L lateral frontalis:  5 unit(s)    R lateral frontalis:  5 unit(s)    L medial frontalis:  5 unit(s)    R medial frontalis:  5 unit(s)    L temporalis injection amount:  20 unit(s)    R temporalis injection amount:  20 unit(s)    Procerus injection amount:  5 unit(s)    L lateral occipitalis injection amount:  5 unit(s)    R lateral occipitalis injection amount:  5 unit(s)    L medial occipitalis injection amount:  10 unit(s)    R medial occipitalis injection amount:  10 unit(s)    L inferior cervical paraspinal injection amount:  10 unit(s)    R inferior cervical paraspinal injection amount:  10 unit(s)    L superior trapezius injection amount:  0 unit(s)    R superior trapezius injection amount:  0 unit(s)  Total Units:     Total units used:  155    Total units discarded:  45  Post-procedure details:     Chemodenervation:  Chronic migraine    Facial Nerve Location[de-identified]  Bilateral facial nerve    Patient tolerance of procedure:   Tolerated well, no immediate complications  Comments:      Extra of 30 units in the bilateral temporoparietal regions/scalp, medically necessary  Trapezius muscles were avoided        Vitals:    03/21/19 0822   BP: 121/88   Pulse: 95   Temp: 98 5 °F (36 9 °C)       Per pt and mom's request, letter constructed for [employer] and faxed to [employer]:  [phone](N); Claim #[id number]

## 2019-03-21 NOTE — LETTER
March 21, 2019     Patient: Collin Frazier   YOB: 1990   Date of Visit: 3/21/2019       To Whom it May Concern:    Ms Collin Frazier is an extremely pleasant patient who continues to seek treatment in our neurology office since 6/21/2017  She continues to be compliant with her medications and treatment plan despite her ongoing struggle with chronic migraine headaches, ankylosing spondylitis and fibromyalgia  Her chronic migraines consist of significant light, sound and smell sensitivity, dizziness, nausea and vomiting  Her migraine headaches also consist of significant disorientation and confusion at the time of the migraine  Sometimes she is unable to abort her migraine headache despite the use of several rescue medications listed in her chart  At this time I recommend that the patient take disability since she has a complicated medical history and during this time she is trying to control her multiple comorbidities with her various specialists, including Neurology and Rheumatology  As noted above, aside from her chronic migraines, she also struggles with ankylosing spondylitis and fibromyalgia for which we are attempting to adjust her medications, therapies and treatment plan  Adjusting her treatment plan can take several months and she cannot hold a full or part-time job while making these adjustments  If you need more information on her medications and other treatment plan, I am happy to provide this per request     I appreciate your time spent reading this letter  If you have any questions or concerns, please don't hesitate to call        Sincerely,        Dominique Mcgowan PA-C      CC: Shiv Thurman,

## 2019-03-28 DIAGNOSIS — G44.59 OTHER COMPLICATED HEADACHE SYNDROME: ICD-10-CM

## 2019-03-29 RX ORDER — SUMATRIPTAN 100 MG/1
100 TABLET, FILM COATED ORAL ONCE AS NEEDED
Qty: 9 TABLET | Refills: 1 | Status: SHIPPED | OUTPATIENT
Start: 2019-03-29 | End: 2019-05-28 | Stop reason: SDUPTHER

## 2019-04-02 ENCOUNTER — TELEPHONE (OUTPATIENT)
Dept: FAMILY MEDICINE CLINIC | Facility: CLINIC | Age: 29
End: 2019-04-02

## 2019-04-12 ENCOUNTER — OFFICE VISIT (OUTPATIENT)
Dept: FAMILY MEDICINE CLINIC | Facility: CLINIC | Age: 29
End: 2019-04-12
Payer: COMMERCIAL

## 2019-04-12 VITALS
OXYGEN SATURATION: 99 % | HEIGHT: 63 IN | HEART RATE: 79 BPM | WEIGHT: 177 LBS | RESPIRATION RATE: 12 BRPM | BODY MASS INDEX: 31.36 KG/M2 | SYSTOLIC BLOOD PRESSURE: 122 MMHG | DIASTOLIC BLOOD PRESSURE: 82 MMHG

## 2019-04-12 DIAGNOSIS — L30.9 HAND DERMATITIS: Primary | ICD-10-CM

## 2019-04-12 PROCEDURE — 3008F BODY MASS INDEX DOCD: CPT | Performed by: FAMILY MEDICINE

## 2019-04-12 PROCEDURE — 99213 OFFICE O/P EST LOW 20 MIN: CPT | Performed by: FAMILY MEDICINE

## 2019-04-12 PROCEDURE — 1036F TOBACCO NON-USER: CPT | Performed by: FAMILY MEDICINE

## 2019-04-19 ENCOUNTER — TELEPHONE (OUTPATIENT)
Dept: FAMILY MEDICINE CLINIC | Facility: CLINIC | Age: 29
End: 2019-04-19

## 2019-04-23 ENCOUNTER — OFFICE VISIT (OUTPATIENT)
Dept: OBGYN CLINIC | Facility: MEDICAL CENTER | Age: 29
End: 2019-04-23
Payer: COMMERCIAL

## 2019-04-23 VITALS — SYSTOLIC BLOOD PRESSURE: 124 MMHG | DIASTOLIC BLOOD PRESSURE: 96 MMHG | BODY MASS INDEX: 30.89 KG/M2 | WEIGHT: 174.4 LBS

## 2019-04-23 DIAGNOSIS — Z11.3 SCREENING FOR STDS (SEXUALLY TRANSMITTED DISEASES): ICD-10-CM

## 2019-04-23 DIAGNOSIS — N93.0 BLEEDING AFTER INTERCOURSE: Primary | ICD-10-CM

## 2019-04-23 DIAGNOSIS — N76.0 ACUTE VAGINITIS: ICD-10-CM

## 2019-04-23 PROCEDURE — 87591 N.GONORRHOEAE DNA AMP PROB: CPT | Performed by: OBSTETRICS & GYNECOLOGY

## 2019-04-23 PROCEDURE — 99214 OFFICE O/P EST MOD 30 MIN: CPT | Performed by: OBSTETRICS & GYNECOLOGY

## 2019-04-23 PROCEDURE — 87491 CHLMYD TRACH DNA AMP PROBE: CPT | Performed by: OBSTETRICS & GYNECOLOGY

## 2019-04-23 PROCEDURE — 87660 TRICHOMONAS VAGIN DIR PROBE: CPT | Performed by: OBSTETRICS & GYNECOLOGY

## 2019-04-23 PROCEDURE — 87480 CANDIDA DNA DIR PROBE: CPT | Performed by: OBSTETRICS & GYNECOLOGY

## 2019-04-23 PROCEDURE — 87510 GARDNER VAG DNA DIR PROBE: CPT | Performed by: OBSTETRICS & GYNECOLOGY

## 2019-04-24 ENCOUNTER — OFFICE VISIT (OUTPATIENT)
Dept: FAMILY MEDICINE CLINIC | Facility: CLINIC | Age: 29
End: 2019-04-24
Payer: COMMERCIAL

## 2019-04-24 VITALS
WEIGHT: 174 LBS | TEMPERATURE: 99.3 F | SYSTOLIC BLOOD PRESSURE: 124 MMHG | BODY MASS INDEX: 30.83 KG/M2 | DIASTOLIC BLOOD PRESSURE: 90 MMHG | HEIGHT: 63 IN

## 2019-04-24 DIAGNOSIS — F41.0 PANIC ATTACKS: ICD-10-CM

## 2019-04-24 DIAGNOSIS — G43.709 CHRONIC MIGRAINE WITHOUT AURA WITHOUT STATUS MIGRAINOSUS, NOT INTRACTABLE: ICD-10-CM

## 2019-04-24 DIAGNOSIS — J00 ACUTE NASOPHARYNGITIS: Primary | ICD-10-CM

## 2019-04-24 PROBLEM — J06.9 URI (UPPER RESPIRATORY INFECTION): Status: ACTIVE | Noted: 2019-04-24

## 2019-04-24 LAB
C TRACH DNA SPEC QL NAA+PROBE: NEGATIVE
N GONORRHOEA DNA SPEC QL NAA+PROBE: NEGATIVE

## 2019-04-24 PROCEDURE — 99213 OFFICE O/P EST LOW 20 MIN: CPT | Performed by: FAMILY MEDICINE

## 2019-04-24 RX ORDER — CLONAZEPAM 1 MG/1
1 TABLET ORAL DAILY
Qty: 30 TABLET | Refills: 0 | Status: SHIPPED | OUTPATIENT
Start: 2019-04-24 | End: 2019-07-01 | Stop reason: SDUPTHER

## 2019-04-24 RX ORDER — KETOROLAC TROMETHAMINE 30 MG/ML
60 INJECTION, SOLUTION INTRAMUSCULAR; INTRAVENOUS ONCE
Status: COMPLETED | OUTPATIENT
Start: 2019-04-24 | End: 2019-04-24

## 2019-04-24 RX ORDER — AMOXICILLIN 500 MG/1
1000 CAPSULE ORAL EVERY 12 HOURS SCHEDULED
Qty: 40 CAPSULE | Refills: 0 | Status: SHIPPED | OUTPATIENT
Start: 2019-04-24 | End: 2019-05-04

## 2019-04-24 RX ADMIN — KETOROLAC TROMETHAMINE 60 MG: 30 INJECTION, SOLUTION INTRAMUSCULAR; INTRAVENOUS at 14:00

## 2019-04-25 LAB
CANDIDA RRNA VAG QL PROBE: NEGATIVE
G VAGINALIS RRNA GENITAL QL PROBE: NEGATIVE
T VAGINALIS RRNA GENITAL QL PROBE: NEGATIVE

## 2019-04-26 ENCOUNTER — TELEPHONE (OUTPATIENT)
Dept: OBGYN CLINIC | Facility: MEDICAL CENTER | Age: 29
End: 2019-04-26

## 2019-04-26 DIAGNOSIS — N92.4 EXCESSIVE BLEEDING IN PREMENOPAUSAL PERIOD: Primary | ICD-10-CM

## 2019-04-29 ENCOUNTER — HOSPITAL ENCOUNTER (OUTPATIENT)
Dept: ULTRASOUND IMAGING | Facility: HOSPITAL | Age: 29
Discharge: HOME | End: 2019-04-29
Payer: COMMERCIAL

## 2019-04-29 DIAGNOSIS — N92.4 EXCESSIVE BLEEDING IN PREMENOPAUSAL PERIOD: ICD-10-CM

## 2019-04-29 PROCEDURE — 76856 US EXAM PELVIC COMPLETE: CPT

## 2019-04-29 PROCEDURE — 76830 TRANSVAGINAL US NON-OB: CPT

## 2019-05-06 ENCOUNTER — OFFICE VISIT (OUTPATIENT)
Dept: FAMILY MEDICINE CLINIC | Facility: CLINIC | Age: 29
End: 2019-05-06
Payer: COMMERCIAL

## 2019-05-06 VITALS
HEIGHT: 63 IN | TEMPERATURE: 98 F | DIASTOLIC BLOOD PRESSURE: 80 MMHG | SYSTOLIC BLOOD PRESSURE: 124 MMHG | WEIGHT: 173 LBS | BODY MASS INDEX: 30.65 KG/M2

## 2019-05-06 DIAGNOSIS — F41.1 GENERALIZED ANXIETY DISORDER: ICD-10-CM

## 2019-05-06 DIAGNOSIS — M79.7 FIBROMYALGIA: ICD-10-CM

## 2019-05-06 DIAGNOSIS — M79.7 FIBROMYALGIA SYNDROME: Primary | ICD-10-CM

## 2019-05-06 PROCEDURE — 99213 OFFICE O/P EST LOW 20 MIN: CPT | Performed by: FAMILY MEDICINE

## 2019-05-06 RX ORDER — BUPROPION HYDROCHLORIDE 150 MG/1
150 TABLET ORAL DAILY
Qty: 30 TABLET | Refills: 2 | Status: SHIPPED | OUTPATIENT
Start: 2019-05-06 | End: 2019-07-01 | Stop reason: SDUPTHER

## 2019-05-06 RX ORDER — ARIPIPRAZOLE 2 MG/1
2 TABLET ORAL DAILY
Qty: 30 TABLET | Refills: 2 | Status: SHIPPED | OUTPATIENT
Start: 2019-05-06 | End: 2019-07-01 | Stop reason: SDUPTHER

## 2019-05-06 RX ORDER — MULTIVIT-MIN/IRON/FOLIC ACID/K 18-600-40
1 CAPSULE ORAL DAILY
Qty: 30 CAPSULE | Refills: 2 | Status: SHIPPED | OUTPATIENT
Start: 2019-05-06 | End: 2019-08-28

## 2019-05-08 ENCOUNTER — PROCEDURE VISIT (OUTPATIENT)
Dept: OBGYN CLINIC | Facility: MEDICAL CENTER | Age: 29
End: 2019-05-08
Payer: COMMERCIAL

## 2019-05-08 VITALS — BODY MASS INDEX: 31.07 KG/M2 | WEIGHT: 175.4 LBS | DIASTOLIC BLOOD PRESSURE: 56 MMHG | SYSTOLIC BLOOD PRESSURE: 100 MMHG

## 2019-05-08 DIAGNOSIS — Z76.89 ENCOUNTER FOR BIOPSY: ICD-10-CM

## 2019-05-08 DIAGNOSIS — R93.89 ABNORMAL RESULTS ON IMAGING STUDY OF GENITOURINARY SYSTEM: Primary | ICD-10-CM

## 2019-05-08 DIAGNOSIS — N93.0 BLEEDING AFTER INTERCOURSE: ICD-10-CM

## 2019-05-08 LAB — SL AMB POCT URINE HCG: NEGATIVE

## 2019-05-08 PROCEDURE — 88305 TISSUE EXAM BY PATHOLOGIST: CPT | Performed by: PATHOLOGY

## 2019-05-08 PROCEDURE — 58100 BIOPSY OF UTERUS LINING: CPT | Performed by: OBSTETRICS & GYNECOLOGY

## 2019-05-08 PROCEDURE — 81025 URINE PREGNANCY TEST: CPT | Performed by: OBSTETRICS & GYNECOLOGY

## 2019-05-13 ENCOUNTER — TELEPHONE (OUTPATIENT)
Dept: NEUROLOGY | Facility: CLINIC | Age: 29
End: 2019-05-13

## 2019-05-16 ENCOUNTER — TELEPHONE (OUTPATIENT)
Dept: NEUROLOGY | Facility: CLINIC | Age: 29
End: 2019-05-16

## 2019-05-20 ENCOUNTER — TELEPHONE (OUTPATIENT)
Dept: NEUROLOGY | Facility: CLINIC | Age: 29
End: 2019-05-20

## 2019-05-20 ENCOUNTER — PATIENT MESSAGE (OUTPATIENT)
Dept: NEUROLOGY | Facility: CLINIC | Age: 29
End: 2019-05-20

## 2019-05-21 DIAGNOSIS — R41.89 COGNITIVE DECLINE: ICD-10-CM

## 2019-05-21 DIAGNOSIS — M54.16 LUMBAR RADICULOPATHY: ICD-10-CM

## 2019-05-21 DIAGNOSIS — G43.109 MIGRAINE WITH AURA AND WITHOUT STATUS MIGRAINOSUS, NOT INTRACTABLE: Primary | ICD-10-CM

## 2019-05-21 DIAGNOSIS — M79.7 FIBROMYALGIA SYNDROME: ICD-10-CM

## 2019-05-21 DIAGNOSIS — R53.82 CHRONIC FATIGUE: ICD-10-CM

## 2019-05-21 DIAGNOSIS — M54.12 CERVICAL RADICULITIS: ICD-10-CM

## 2019-05-28 DIAGNOSIS — G44.59 OTHER COMPLICATED HEADACHE SYNDROME: ICD-10-CM

## 2019-05-29 RX ORDER — SUMATRIPTAN 100 MG/1
100 TABLET, FILM COATED ORAL ONCE AS NEEDED
Qty: 9 TABLET | Refills: 1 | Status: SHIPPED | OUTPATIENT
Start: 2019-05-29 | End: 2019-06-24 | Stop reason: SDUPTHER

## 2019-05-30 ENCOUNTER — DOCUMENTATION (OUTPATIENT)
Dept: NEUROLOGY | Facility: CLINIC | Age: 29
End: 2019-05-30

## 2019-06-04 ENCOUNTER — TELEPHONE (OUTPATIENT)
Dept: BEHAVIORAL/MENTAL HEALTH CLINIC | Facility: CLINIC | Age: 29
End: 2019-06-04

## 2019-06-06 ENCOUNTER — TELEPHONE (OUTPATIENT)
Dept: UROLOGY | Facility: AMBULATORY SURGERY CENTER | Age: 29
End: 2019-06-06

## 2019-06-20 DIAGNOSIS — Z30.44 ENCOUNTER FOR SURVEILLANCE OF VAGINAL RING HORMONAL CONTRACEPTIVE DEVICE: ICD-10-CM

## 2019-06-20 RX ORDER — ETONOGESTREL AND ETHINYL ESTRADIOL 11.7; 2.7 MG/1; MG/1
INSERT, EXTENDED RELEASE VAGINAL
Qty: 3 EACH | Refills: 0 | Status: SHIPPED | OUTPATIENT
Start: 2019-06-20 | End: 2019-07-02 | Stop reason: SDUPTHER

## 2019-06-21 ENCOUNTER — TELEPHONE (OUTPATIENT)
Dept: OBGYN CLINIC | Facility: MEDICAL CENTER | Age: 29
End: 2019-06-21

## 2019-06-24 ENCOUNTER — PROCEDURE VISIT (OUTPATIENT)
Dept: NEUROLOGY | Facility: CLINIC | Age: 29
End: 2019-06-24
Payer: COMMERCIAL

## 2019-06-24 VITALS — DIASTOLIC BLOOD PRESSURE: 72 MMHG | SYSTOLIC BLOOD PRESSURE: 110 MMHG | TEMPERATURE: 98.6 F | HEART RATE: 95 BPM

## 2019-06-24 DIAGNOSIS — G43.709 CHRONIC MIGRAINE WITHOUT AURA WITHOUT STATUS MIGRAINOSUS, NOT INTRACTABLE: Primary | ICD-10-CM

## 2019-06-24 DIAGNOSIS — G44.59 OTHER COMPLICATED HEADACHE SYNDROME: ICD-10-CM

## 2019-06-24 PROCEDURE — 64615 CHEMODENERV MUSC MIGRAINE: CPT | Performed by: PHYSICIAN ASSISTANT

## 2019-06-24 RX ORDER — SYRINGE W-NEEDLE,DISPOSAB,3 ML 25GX5/8"
SYRINGE, EMPTY DISPOSABLE MISCELLANEOUS
Qty: 4 EACH | Refills: 0 | Status: SHIPPED | OUTPATIENT
Start: 2019-06-24

## 2019-06-24 RX ORDER — SUMATRIPTAN 100 MG/1
100 TABLET, FILM COATED ORAL ONCE AS NEEDED
Qty: 9 TABLET | Refills: 1 | Status: SHIPPED | OUTPATIENT
Start: 2019-06-24 | End: 2019-08-02 | Stop reason: SDUPTHER

## 2019-06-24 RX ORDER — KETOROLAC TROMETHAMINE 30 MG/ML
INJECTION, SOLUTION INTRAMUSCULAR; INTRAVENOUS
Qty: 6 ML | Refills: 1 | Status: SHIPPED | OUTPATIENT
Start: 2019-06-24 | End: 2020-08-11 | Stop reason: SDUPTHER

## 2019-06-24 RX ORDER — TOPIRAMATE 50 MG/1
50 CAPSULE, EXTENDED RELEASE ORAL DAILY
Qty: 30 CAPSULE | Refills: 2 | Status: SHIPPED | OUTPATIENT
Start: 2019-06-24 | End: 2019-09-10 | Stop reason: DRUGHIGH

## 2019-06-26 ENCOUNTER — SOCIAL WORK (OUTPATIENT)
Dept: BEHAVIORAL/MENTAL HEALTH CLINIC | Facility: CLINIC | Age: 29
End: 2019-06-26
Payer: COMMERCIAL

## 2019-06-26 DIAGNOSIS — F32.1 CURRENT MODERATE EPISODE OF MAJOR DEPRESSIVE DISORDER WITHOUT PRIOR EPISODE (HCC): Primary | ICD-10-CM

## 2019-06-26 PROCEDURE — 90791 PSYCH DIAGNOSTIC EVALUATION: CPT | Performed by: SOCIAL WORKER

## 2019-06-26 NOTE — BH TREATMENT PLAN
Samuel Riley  1990       Date of Initial Treatment Plan: 6/26/2019  Date of Current Treatment Plan: 06/26/19    Treatment Plan Number 1     Strengths/Personal Resources for Self Care: Very helpful, volunteers at cat shelter, good listener     Diagnosis:   1  Current moderate episode of major depressive disorder without prior episode ([facility] [address] )         Area of Needs: Depression and anxiety, pain management      Long Term Goal 1: To be able to cope with anxiety and limit feelings of depression  Target Date: 10/15/2019   Completion Date: to be determined          Short Term Objectives for Goal 1: learn coping skills to manage mood regulation  Ted Mcdermott will learn coping skills, be able to identify triggers for her anxiety and depression  Long Term Goal 2: to be able to maintain healthy relationships    Target Date: 10/15/2019  Completion Date: to be determined    Short Term Objectives for Goal 2: To be able to learn how to interact with others and manage anxiety surrounding those interactions  Ted Mcdermott will be able to identify barriers to her communication with others and ways to overcome those barriers  GOAL 1: Modality: Individual 1-2x per month   Completion Date to be determined, Medication Management and The person(s) responsible for carrying out the plan is  [facility]-client LEXIE Alvarez Intern [place] , [facility] clinicians, SIMRAN Catalan    Clinician will use CBT and DBT-informed skills, client-centered therapy, and solution-focused therapy to address Cruz's mood regulation, distress tolerance, and relationship skills  Jose Angelleslie Mcdermott will practice skills between sessions and will report back, at subsequent sessions, regarding successes and barriers  Behavioral Health Treatment Plan [facility]: Diagnosis and Treatment Plan explained to Samuel Pearson relates understanding diagnosis and is agreeable to Treatment Plan         Client Comments : Please share your thoughts, feelings, need and/or experiences regarding your treatment plan: "I feel very good about it "
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
## 2019-06-30 NOTE — PSYCH
[address]    Name and Date of Birth:  Mara Salgado 33 y o  1990    Date of Visit: July 1, 2019    Reason for visit:  "To feel better "    JOVANY     Teresa Weaver is a 33 y o  female with a history of BESSY and depressive Sxs, most recently being treated with Sertraline 200mg qd, Bupropion XL 150mg bid, and Aripiprazole 2mg qhs by PMD, Clonazepam 1mg qd  She who presents for psychiatric evaluation and Tx by PMD   Pt was first seen by Mirna Grider LCSW for psychotherapy 6/26/2019 and I reviewed her note and full Tx plan  Ferne Osler diagnosed Moderate MDD  Pt presently voices a primary c/o  "To feel better "  Her major problem is the "Anxiety" at present which is high at 8/10, and last panic attack was 3 weeks ago triggered in her sleep and awakened her  Anxiety and panic Sxs are as described in below Hx  She is also depressed with Sxs as described in Hx  She rates depression at 5/10  She presently denies SI, HI, self-injurious thoughts/intent/plan, or manic or psychotic Sxs  She denies any ETOH or illicit drug use/abuse  Pt feels that the Bupropion XL, the SGA, and BZD by PMD are helping  She does not feel Sertraline is helping enough however, she does not know if she has been taking it  An April note by Dr Mary Lion stated he was renewing it, but Pt does not firmly recall this  Also the Rx log does NOT show a renewed Rx in 4/2019 and Pt called her office and the nurse there could not find record of a renewal since 10/2018  Jase Brooke She intends to continue psychotherapy with Mirna Grider LCSW  Pt is not currently working and is seeking long term disability due to fibromyalgia and has body pain of 3-4/10 at this time  However, she does not want to be on disability forever and has 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      Note: is on the NuvaRing--continuously due to menorrhagia, so menses are not occurring  Georgette Ormond HPI ROS Appetite Changes and Sleep: decreased sleep, decreased appetite, decreased energy      Review Of Systems:    Constitutional feeling tired   ENT as noted in HPI   Cardiovascular as noted in HPI   Respiratory as noted in HPI   Gastrointestinal as noted in HPI   Genitourinary negative   Musculoskeletal as noted in HPI   Integumentary negative   Neurological as noted in HPI   Endocrine negative   Other Symptoms none       Past Psychiatric History:   Pt grew up with mother   She never met her biological father who  by suicide before Pt was born  Pt first experienced Sxs of a psychiatric nature was approx 7y/o and started with "Separation anxiety" daily for her mom  Pt would not go to sleep overs with friends and when she tried to, she wound up calling her mom to pick her up and go home  Her anxiety continues throughout her schooling, due to personal insecurities was nervous around other students  She denies being bullied  Her anxiety worsened in college  She states she gets "Worried if I can't do something or if I think I'm doing something wrong " She fears making mistakes and has worries that bad things will happen to her family or friends  Anxiety also causes irritability, impaired concentration and muscle tension in shoulders and stomach, sometimes to the point of nausea  Panic attacks started in adulthood with Sxs of severe anxiety, "Hearing is fuzzy," blurry vision, sweats, heart pounding, lightheadedness, shortness of breath, and paresthesias in her whole body to where "I don't really feel anything "    Depression started in approx 2017 with Sxs of daily sadness which gets lighter and heavier at times  Other depressive Sxs are crying at times, feelings of hopelessness and worthlessness, withdrawing from friends or family, anhedonia, reduced appetite, insomnia, and low energy and motivation    At one point during a severe bout triggered by her fibromyalgia pain, she had self mutilating/cutting ideations but did not follow through  She states the thought occurred for a "Split second and she called her grandmother  Pt denies any h/o OCD, eating disorders, or manic or psychotic Sxs  Pt was first diagnosed with a psychiatric illness as an adult, (BESSY) by PMD   He started her on Sertraline and this gave limited benefit at max dose so he added Bupropion XL which improved her Sxs but did not resolve them  She was also started on Ariprazole for augmentation of mood mgt and Clonazepam for anxiety  First therapist:  Catherine Arellano for approx 7 - 10 years until therapist left the practice (called [facility] office)  Pt then started seeing Elle Matias on 6/26/2019     Pt was referred to [facility] by PMD Dr Santiago Bernabe of [facility], due to depression and anxiety s/o being fired from her job due to medical issues  Pt denied h/o SI, suicide attempts, self-injurious behaviors, violent behaviors, psychiatric hospitalizations, ECT, or legal or  hx    Prior Rx trials: Mirtazapine (? SE), Depakote ER (dilated pupils, insomnia, angry), Lorazepam (Pt uncertain of why it was switched to Clonazepam)    Abuse Hx:  Pt denied h/o childhood physical or sexual abuse  Trauma Hx:  Pt saw her MGM being intubated due to an MI in 2/2016  Her MGM survived and wrote a book about her experience of hearing the family talking while intubated  Pt does not want to read the book because it is a reminder of what happened and due to fear of the MGM's inevitable death at some point      Family Psychiatric History:     Family History   Problem Relation Age of Onset    Rheum arthritis Mother     Psoriasis Mother     Other Mother     Hypertension Mother     Diabetes unspecified Mother     Sjogren's syndrome Mother     Alcohol abuse Mother     Drug abuse Mother     Anxiety disorder Father     Alcohol abuse Father    Gaurav Hero Cancer Other     Diabetes Other     Other Other         High blood pressure    Depression Maternal Grandmother        Substance Use History:    Social History     Substance and Sexual Activity   Drug Use No       Social History:    Social History     Socioeconomic History    Marital status: Single     Spouse name: Not on file    Number of children: 0    Years of education: 15    Highest education level: Not on file   Occupational History    Occupation: Unemployed   Social Needs    Financial resource strain: Very hard    Food insecurity:     Worry: Often true     Inability: Never true    Transportation needs:     Medical: No     Non-medical: No   Tobacco Use    Smoking status: Never Smoker    Smokeless tobacco: Never Used   Substance and Sexual Activity    Alcohol use: No    Drug use: No    Sexual activity: Yes     Partners: Male     Birth control/protection: Ring     Comment: Uses birth control   Lifestyle    Physical activity:     Days per week: 0 days     Minutes per session: Not on file    Stress: Not on file   Relationships    Social connections:     Talks on phone: Not on file     Gets together: Not on file     Attends Muslim service: Not on file     Active member of club or organization: Not on file     Attends meetings of clubs or organizations: Not on file     Relationship status: Not on file    Intimate partner violence:     Fear of current or ex partner: Not on file     Emotionally abused: Not on file     Physically abused: Not on file     Forced sexual activity: Not on file   Other Topics Concern    Not on file   Social History Narrative    Home:  Living with mom and MGM        Education:    Pt denies any h/o learning disability Dxs but admits to having great difficulty in math requiring a   She reached childhood milestones on time as far as he knows      Graduated HS 2009    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 college art classes--she stopped due to anxiety from dissatisfaction with her classes--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 felt they were telling her what to create  On reflection, she feels it was moreso her anxiety as the cause of her leaving school, and she was using the other reason as an excuse so she would not have to admit to anxiety at that time  She is now very open about her anxiety and does not mind that I have this information in the general social section of her chart  Past Medical History:     History of Seizures: no  History of Head injury with loss of consciousness: no    Past Medical History:   Diagnosis Date    Abnormal Pap smear of cervix     Fibromyalgia, primary     Migraine      Past Surgical History:   Procedure Laterality Date    COLONOSCOPY N/A 5/8/2018    Procedure: COLONOSCOPY;  Surgeon: Marshall Marinelli MD;  Location: [facility]; Service: Gastroenterology    TONSILLECTOMY      WISDOM TOOTH EXTRACTION       Allergies: Allergies   Allergen Reactions    Depakote Er  [Valproic Acid]      Other reaction(s): dilated pupils, "schizi"    Desvenlafaxine      Other reaction(s): state of confusion     History Review:     The following portions of the patient's history were reviewed and updated as appropriate: allergies, current medications, past family history, past medical history, past social history, past surgical history and problem list     OBJECTIVE:      Mental Status Evaluation:    Appearance casually dressed, fair eye contact, good hygiene   Behavior pleasant, cooperative, calm overall, but a quietly anxious bearing and twists a lock of her hair throughout interview   Speech normal rate and volume, clear, coherent   Mood depressed, anxious   Affect mildly constricted   Thought Processes organized, goal directed, but negative   Associations intact associations   Thought Content negative thoughts, ruminations   Perceptual Disturbances: denies auditory hallucinations when asked, does not appear responding to internal stimuli   Abnormal Thoughts  Risk Potential Suicidal ideation - None  Homicidal ideation - None  Potential for aggression - No   Orientation oriented to person, place, situation, day of week, date, month of year and year   Memory short term memory grossly intact   Cosciousness alert and awake   Attention Span attention span and concentration are age appropriate   Intellect appears to be of average intelligence   Insight fair   Judgement fair   Muscle Strength and  Gait normal gait and normal balance   Language no difficulty naming common objects, no difficulty repeating a phrase   Fund of Knowledge adequate knowledge of current events  adequate fund of knowledge regarding past history  adequate fund of knowledge regarding vocabulary    Pain mild   Pain Scale fibromyalgia       Laboratory Results:   I have personally reviewed all pertinent laboratory/tests results--Care everywhere and [facility]  Noted that Vit D was low in 5/2019  Assessment/Plan:     Diagnoses and all orders for this visit:    Generalized anxiety disorder  -     RPR; Future  -     Toxicology screen, urine  -     buPROPion (WELLBUTRIN XL) 150 mg 24 hr tablet; Take 1 tablet (150 mg total) by mouth daily  -     citalopram (CeleXA) 20 mg tablet; Take 1 tablet (20 mg total) by mouth daily    Panic attacks  -     clonazePAM (KlonoPIN) 1 mg tablet; Take 1 tablet (1 mg total) by mouth daily  -     citalopram (CeleXA) 20 mg tablet; Take 1 tablet (20 mg total) by mouth daily    Depression, major, recurrent, moderate (HCC)  -     RPR; Future  -     Toxicology screen, urine  -     citalopram (CeleXA) 20 mg tablet; Take 1 tablet (20 mg total) by mouth daily    Sleep disturbance  -     Toxicology screen, urine    Fibromyalgia syndrome    Vitamin B12 deficiency    Vitamin D deficiency    Fibromyalgia  -     ARIPiprazole (ABILIFY) 2 mg tablet;  Take 1 tablet (2 mg total) by mouth daily        Plan:  Pt is having Sxs of severe anxiety with panic attacks, also moderate depression for which Sertraline is not helping enough at max dose  Sleep is also an issue and sleep hygiene discussed  Continue the Bupropion XL, SGA, for mood mgt, and BZD for anxiety  She takes Bupropion XL and Aripiprazole at night and I discussed that she should keep the SGA at hs and place Bupropion XL in the AM and early afternoon because it can interfere with sleep  I discussed eating healthy, good hydration and exercise as allowable by PMD and neurologist as well as staying cool in the Summer heat especially given her SGA  It appears she has not been getting Sertraline in recent months  I called [facility] which confirmed that they have not filled a Rx since 10/2018 for this SSRI  I discussed that if some how or some way she has been taking Sertraline, she must NOT start the Citalopram and call me to inform so I may take proper steps to wean her down from the Sertraline first   Pt verbalized understanding  Start Citalopram 20mg (1) tab po qhs # 30 R1  Continue the following of which I gave Rxs with stipulation to disregard any refills on prior Rxs:  Bupropion to XL 150mg (1) tab po qd # 30 R1  Aripiprazole 2mg (1) tab po qhs # 30 R1   Clonazepam 1mg (1) tab po qd # 30 R1  Vit D and Vit B12 supplementation per PMD   Continue psychotherapy with Ermias Tijerina  F/U PMD and specialists for medical issues  Get RPR, UDS  Return 4-6 weeks, call sooner prn    Risks/Benefits/Precautions:      Risks, Benefits And Possible Side Effects Of Medications:    Risks, benefits, and possible side effects of medications explained to Cruz and she verbalizes understanding and agreement for treatment  Risks of medications in pregnancy explained to Jose Suguillermina Melanie  She verbalizes understanding and agrees to notify her doctor if she becomes pregnant      Controlled Medication Discussion:     Jose Georgesthuyvictoriano Navarro has been filling controlled prescriptions on time as prescribed according to [place] Prescription Drug Monitoring Program    Discussed with Grecia Goodman the risks of sedation, respiratory depression, impairment of ability to drive and potential for abuse and addiction related to treatment with benzodiazepine medications  She understands risk of treatment with benzodiazepine medications, agrees to not drive if feels impaired and agrees to take medications as prescribed      Kierstenalondra West PA-C

## 2019-07-01 ENCOUNTER — OFFICE VISIT (OUTPATIENT)
Dept: PSYCHIATRY | Facility: CLINIC | Age: 29
End: 2019-07-01
Payer: COMMERCIAL

## 2019-07-01 ENCOUNTER — TELEPHONE (OUTPATIENT)
Dept: NEUROLOGY | Facility: CLINIC | Age: 29
End: 2019-07-01

## 2019-07-01 VITALS
HEART RATE: 94 BPM | HEIGHT: 63 IN | DIASTOLIC BLOOD PRESSURE: 82 MMHG | BODY MASS INDEX: 30.19 KG/M2 | SYSTOLIC BLOOD PRESSURE: 115 MMHG | WEIGHT: 170.4 LBS

## 2019-07-01 DIAGNOSIS — E55.9 VITAMIN D DEFICIENCY: ICD-10-CM

## 2019-07-01 DIAGNOSIS — E53.8 VITAMIN B12 DEFICIENCY: ICD-10-CM

## 2019-07-01 DIAGNOSIS — M79.7 FIBROMYALGIA SYNDROME: ICD-10-CM

## 2019-07-01 DIAGNOSIS — M79.7 FIBROMYALGIA: ICD-10-CM

## 2019-07-01 DIAGNOSIS — F33.1 DEPRESSION, MAJOR, RECURRENT, MODERATE (HCC): ICD-10-CM

## 2019-07-01 DIAGNOSIS — F41.1 GENERALIZED ANXIETY DISORDER: Primary | ICD-10-CM

## 2019-07-01 DIAGNOSIS — G47.9 SLEEP DISTURBANCE: ICD-10-CM

## 2019-07-01 DIAGNOSIS — F41.0 PANIC ATTACKS: ICD-10-CM

## 2019-07-01 PROBLEM — F32.9 MAJOR DEPRESSIVE DISORDER WITH SINGLE EPISODE: Status: RESOLVED | Noted: 2018-04-24 | Resolved: 2019-07-01

## 2019-07-01 PROCEDURE — 90791 PSYCH DIAGNOSTIC EVALUATION: CPT | Performed by: PHYSICIAN ASSISTANT

## 2019-07-01 RX ORDER — CLONAZEPAM 1 MG/1
1 TABLET ORAL DAILY
Qty: 30 TABLET | Refills: 1 | Status: SHIPPED | OUTPATIENT
Start: 2019-07-01 | End: 2019-08-28

## 2019-07-01 RX ORDER — CITALOPRAM 20 MG/1
20 TABLET ORAL DAILY
Qty: 30 TABLET | Refills: 1 | Status: SHIPPED | OUTPATIENT
Start: 2019-07-01 | End: 2019-08-28

## 2019-07-01 RX ORDER — ARIPIPRAZOLE 2 MG/1
2 TABLET ORAL DAILY
Qty: 30 TABLET | Refills: 1 | Status: SHIPPED | OUTPATIENT
Start: 2019-07-01 | End: 2019-09-30 | Stop reason: SDUPTHER

## 2019-07-01 RX ORDER — BUPROPION HYDROCHLORIDE 150 MG/1
150 TABLET ORAL DAILY
Qty: 30 TABLET | Refills: 1 | Status: SHIPPED | OUTPATIENT
Start: 2019-07-01 | End: 2019-09-30 | Stop reason: SDUPTHER

## 2019-07-01 NOTE — TELEPHONE ENCOUNTER
Jason rGeen calls in to ask if we could fax over referral for functional capacity evaluation  Referral faxed to [phone]

## 2019-07-02 ENCOUNTER — ANNUAL EXAM (OUTPATIENT)
Dept: OBGYN CLINIC | Facility: MEDICAL CENTER | Age: 29
End: 2019-07-02
Payer: COMMERCIAL

## 2019-07-02 VITALS — HEIGHT: 63 IN | WEIGHT: 170 LBS | BODY MASS INDEX: 30.12 KG/M2

## 2019-07-02 DIAGNOSIS — Z30.44 ENCOUNTER FOR SURVEILLANCE OF VAGINAL RING HORMONAL CONTRACEPTIVE DEVICE: ICD-10-CM

## 2019-07-02 PROCEDURE — S0612 ANNUAL GYNECOLOGICAL EXAMINA: HCPCS | Performed by: NURSE PRACTITIONER

## 2019-07-02 RX ORDER — ETONOGESTREL AND ETHINYL ESTRADIOL 11.7; 2.7 MG/1; MG/1
INSERT, EXTENDED RELEASE VAGINAL
Qty: 3 EACH | Refills: 0 | Status: CANCELLED | OUTPATIENT
Start: 2019-07-02 | End: 2019-07-14

## 2019-07-02 RX ORDER — ETONOGESTREL AND ETHINYL ESTRADIOL 11.7; 2.7 MG/1; MG/1
INSERT, EXTENDED RELEASE VAGINAL
Qty: 3 EACH | Refills: 1 | Status: SHIPPED | OUTPATIENT
Start: 2019-07-02 | End: 2020-02-28 | Stop reason: SDUPTHER

## 2019-07-02 NOTE — PROGRESS NOTES
ASSESSMENT & PLAN: Eduardo Gonsalez is a 33 y o  Precious Finder with normal gynecologic exam     1   Routine well woman exam done today  2  Pap and HPV:  The patient's last pap was   It was normal     Pap was not done today  Current ASCCP Guidelines reviewed  3   The following were reviewed in today's visit: breast self exam, use and side effects of nuva ring, adequate intake of calcium and vitamin D, exercise and healthy diet  Pt  Wants to continue on nuva ring, takes continuously and doing very well on it  States she has not had migraine with aura for a long time  Sees neurologist and getting botox now for migraines that are working well for her  Pt  Aware of increase risk of stroke with estrogen use if migraines w aura  States aware of and accepts risk  Aaron from her office wrote to me stating that she is comfortable with pt using it  4  rx for nurva ring sent to pharmacy  5  rto one year for annual gyn exam      CC:  Annual Gynecologic Examination    HPI: Eduardo Gonsalez is a 33 y o  Precious Finder who presents for annual gynecologic examination  She has the following concerns: none  Wants to continue on nuva ring  Health Maintenance:    She wears her seatbelt routinely  She does not perform regular monthly self breast exams  She feels safe at home  Past Medical History:   Diagnosis Date    Abnormal Pap smear of cervix     Fibromyalgia, primary     Migraine        Past Surgical History:   Procedure Laterality Date    COLONOSCOPY N/A 2018    Procedure: COLONOSCOPY;  Surgeon: Alaina Samayoa MD;  Location: [facility]; Service: Gastroenterology    TONSILLECTOMY      WISDOM TOOTH EXTRACTION         Past OB/Gyn History:  OB History        0    Para   0    Term   0       0    AB   0    Living   0       SAB   0    TAB   0    Ectopic   0    Multiple   0    Live Births   0               Pt does not have menstrual issues      History of sexually transmitted infection: No   History of abnormal pap smears: No      Patient is currently sexually active  heterosexual   The current method of family planning is NuvaRing vaginal inserts      Family History   Problem Relation Age of Onset    Rheum arthritis Mother     Psoriasis Mother     Other Mother     Hypertension Mother     Diabetes unspecified Mother     Sjogren's syndrome Mother     Alcohol abuse Mother     Drug abuse Mother     Anxiety disorder Father     Alcohol abuse Father     Cancer Other     Diabetes Other     Other Other         High blood pressure    Depression Maternal Grandmother        Social History:  Social History     Socioeconomic History    Marital status: Single     Spouse name: Not on file    Number of children: 0    Years of education: 15    Highest education level: Not on file   Occupational History    Occupation: Unemployed   Social Needs    Financial resource strain: Very hard    Food insecurity:     Worry: Often true     Inability: Never true    Transportation needs:     Medical: No     Non-medical: No   Tobacco Use    Smoking status: Never Smoker    Smokeless tobacco: Never Used   Substance and Sexual Activity    Alcohol use: No    Drug use: No    Sexual activity: Yes     Partners: Male     Birth control/protection: Pill     Comment: Uses birth control   Lifestyle    Physical activity:     Days per week: 0 days     Minutes per session: Not on file    Stress: Not on file   Relationships    Social connections:     Talks on phone: Not on file     Gets together: Not on file     Attends [employer] service: Not on file     Active member of club or organization: Not on file     Attends meetings of clubs or organizations: Not on file     Relationship status: Not on file    Intimate partner violence:     Fear of current or ex partner: Not on file     Emotionally abused: Not on file     Physically abused: Not on file     Forced sexual activity: Not on file   Other Topics Concern    Not on file   Social History Narrative    Home:  Living with mom and MGM        Education:    Pt denies any h/o learning disability Dxs but admits to having great difficulty in math requiring a   She reached childhood milestones on time as far as he knows  Graduated HS 2009    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 college art classes--she stopped due to anxiety from dissatisfaction with her classes--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 felt they were telling her what to create  On reflection, she feels it was moreso her anxiety as the cause of her leaving school, and she was using the other reason as an excuse so she would not have to admit to anxiety at that time  She is now very open about her anxiety and does not mind that I have this information in the general social section of her chart  Presently lives with mother  Patient is single    Patient is currently unemployed    Allergies   Allergen Reactions    Depakote Er  [Valproic Acid]      Other reaction(s): dilated pupils, "schizi"    Desvenlafaxine      Other reaction(s): state of confusion         Current Outpatient Medications:     ARIPiprazole (ABILIFY) 2 mg tablet, Take 1 tablet (2 mg total) by mouth daily, Disp: 30 tablet, Rfl: 1    B-D 3CC LUER-DEANN SYR 25GX1" 25G X 1" 3 ML MISC, , Disp: , Rfl:     buPROPion (WELLBUTRIN XL) 150 mg 24 hr tablet, Take 1 tablet (150 mg total) by mouth daily, Disp: 30 tablet, Rfl: 1    Cholecalciferol (VITAMIN D) 2000 units CAPS, Take 1 capsule (2,000 Units total) by mouth daily, Disp: 30 capsule, Rfl: 2    clonazePAM (KlonoPIN) 1 mg tablet, Take 1 tablet (1 mg total) by mouth daily, Disp: 30 tablet, Rfl: 1    etonogestrel-ethinyl estradiol (NUVARING) 0 12-0 015 MG/24HR vaginal ring, Insert ring for 21 days then remove for one week , Disp: 3 each, Rfl: 0    ketorolac (TORADOL) 60 mg/2 mL, Inject 1-2 mL (30-60 mg total) into a muscle every 6 (six) hours as needed for moderate pain, Disp: 6 mL, Rfl: 1    naproxen (NAPROSYN) 500 mg tablet, Take 1 tablet (500 mg total) by mouth as needed for headaches, Disp: 60 tablet, Rfl: 1    onabotulinumtoxin A (BOTOX) 100 units, Inject as directed, Disp: , Rfl:     pregabalin (LYRICA) 25 mg capsule, Take 1 capsule (25 mg total) by mouth 2 (two) times a day, Disp: 60 capsule, Rfl: 3    prochlorperazine (COMPAZINE) 10 mg tablet, Take 1 tablet (10 mg total) by mouth every 6 (six) hours as needed for nausea or vomiting, Disp: 30 tablet, Rfl: 0    sulfaSALAzine (AZULFIDINE) 500 mg tablet, Take 1 tablet (500 mg total) by mouth 2 (two) times a day, Disp: 60 tablet, Rfl: 3    SUMAtriptan (IMITREX) 100 mg tablet, Take 1 tablet (100 mg total) by mouth once as needed for migraine for up to 1 dose, Disp: 9 tablet, Rfl: 1    Syringe/Needle, Disp, (SYRINGE 3CC/03BA8-6/4") 27G X 1-1/4" 3 ML MISC, Use for IM injection of ketorolac , Disp: 4 each, Rfl: 0    TROKENDI  MG CP24, Take 1 capsule (100 mg total) by mouth daily, Disp: 30 capsule, Rfl: 5    TROKENDI XR 50 MG CP24, Take 1 capsule (50 mg total) by mouth daily (With 100 mg qhs)  , Disp: 30 capsule, Rfl: 2    citalopram (CeleXA) 20 mg tablet, Take 1 tablet (20 mg total) by mouth daily (Patient not taking: Reported on 7/2/2019), Disp: 30 tablet, Rfl: 1      Review of Systems  Constitutional :no fever, feels well, no tiredness, no recent weight gain or loss  ENT: no ear ache, no loss of hearing, no nosebleeds or nasal discharge, no sore throat or hoarseness  Cardiovascular: no complaints of slow or fast heart beat, no chest pain, no palpitations, no leg claudication or lower extremity edema    Respiratory: no complaints of shortness of shortness of breath, no MAURO  Breasts:no complaints of breast pain, breast lump, or nipple discharge  Gastrointestinal: no complaints of abdominal pain, constipation, nausea, vomiting, or diarrhea or bloody stools  Genitourinary : no complaints of dysuria, incontinence, pelvic pain, no dysmenorrhea, vaginal discharge or abnormal vaginal bleeding and as noted in HPI  Musculoskeletal: no complaints of arthralgia, no myalgia, no joint swelling or stiffness, no limb pain or swelling  Integumentary: no complaints of skin rash or lesion, itching or dry skin  Neurological: no complaints of headache, no confusion, no numbness or tingling, no dizziness or fainting    Objective      Ht 5' 3" (1 6 m)   Wt 77 1 kg (170 lb)   BMI 30 11 kg/m²   General:   appears stated age, cooperative, alert normal mood and affect   Neck: normal, supple,trachea midline, no masses   Heart: regular rate and rhythm, S1, S2 normal, no murmur, click, rub or gallop   Lungs: clear to auscultation bilaterally   Breasts: normal appearance, no masses or tenderness   Abdomen: soft, non-tender, without masses or organomegaly   Vulva: normal female genitalia   Vagina: not evaluated   Urethra: normal   Cervix: Normal, no discharge  Uterus: normal size, contour, position, consistency, mobility, non-tender   Adnexa: normal adnexa   Lymphatic palpation of lymph nodes in neck, axilla, groin and/or other locations: no lymphadenopathy or masses noted   Skin normal skin turgor and no rashes     Psychiatric orientation to person, place, and time: normal  mood and affect: normal

## 2019-07-05 ENCOUNTER — SOCIAL WORK (OUTPATIENT)
Dept: BEHAVIORAL/MENTAL HEALTH CLINIC | Facility: CLINIC | Age: 29
End: 2019-07-05
Payer: COMMERCIAL

## 2019-07-05 DIAGNOSIS — F41.1 GENERALIZED ANXIETY DISORDER: Primary | ICD-10-CM

## 2019-07-05 DIAGNOSIS — F33.1 DEPRESSION, MAJOR, RECURRENT, MODERATE (HCC): ICD-10-CM

## 2019-07-05 PROCEDURE — 90834 PSYTX W PT 45 MINUTES: CPT | Performed by: SOCIAL WORKER
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
## 2019-07-12 NOTE — TELEPHONE ENCOUNTER
Received a call from pt and her mother asking for the functional capacity results and how to obtain that  Stated she had this done at [facility] on 7/1 and they faxed this to us last week  I called them at [phone] and spoke with Teresa who will fax this again  Will await results, they would like to be called with these results and would also like a copy to be sent to them  I made them aware that we would need a HANK form filled out  They were agreeable  Will postpone message to Monday to f/u

## 2019-07-15 ENCOUNTER — TELEPHONE (OUTPATIENT)
Dept: BEHAVIORAL/MENTAL HEALTH CLINIC | Facility: CLINIC | Age: 29
End: 2019-07-15

## 2019-07-15 NOTE — TELEPHONE ENCOUNTER
Need's a letter with diagnosis and that she continues appointment's on a steady basis  This is for [employer] for her [employer] disability [employer]  She has done a release form for this already

## 2019-07-15 NOTE — TELEPHONE ENCOUNTER
Patient called in to check if we had received functional capacity evaluation results  Informed patient no results scanned into media  Patient is following up with behavioral health

## 2019-07-17 ENCOUNTER — SOCIAL WORK (OUTPATIENT)
Dept: BEHAVIORAL/MENTAL HEALTH CLINIC | Facility: CLINIC | Age: 29
End: 2019-07-17
Payer: COMMERCIAL

## 2019-07-17 DIAGNOSIS — F33.1 DEPRESSION, MAJOR, RECURRENT, MODERATE (HCC): Primary | ICD-10-CM

## 2019-07-17 DIAGNOSIS — F41.1 GENERALIZED ANXIETY DISORDER: ICD-10-CM

## 2019-07-17 PROCEDURE — 90834 PSYTX W PT 45 MINUTES: CPT | Performed by: SOCIAL WORKER

## 2019-07-17 NOTE — TELEPHONE ENCOUNTER
I do not find any HANK for Josey Layton or myself/this writer, to Josephine, and I only just saw Cruz for the first time 7/1/2019  I left msg regarding this on Cruz's cell phone # of record, and for her to provide an updated HANK for the therapist and myself

## 2019-07-17 NOTE — TELEPHONE ENCOUNTER
Julio Whipple,  If you could f/u on this with Cruz, that would be appreciated  I have already left a msg on her voice mail    Thank you,  Kirk Garcia

## 2019-07-17 NOTE — PSYCH
Psychotherapy Provided: Individual Psychotherapy 45 minutes     Length of time in session: 45 minutes, follow up in 2 months (Grecia Goodman will remain on the cancellation wait list for a sooner appointment)    Goals addressed in session: Goal 1     Pain:      moderate to severe-- 4-- rheumatological pain    Current suicide risk : Low     DATA: Met with Cruz for scheduled individual session with this clinician and the social work intern  "My depression is kicking in, and I'm not really sure what happened " Cruz endorses significant middle insomnia  She reports she has been taking the klonopin with no relief  Cruz discussed the connection between her cognitive symptoms and her insomnia  She states that she struggles with "brain fog" every day, but these symptoms are exacerbated by her insomnia  Cruz discussed sleep hygiene  She states she needs to sleep with the television on, due to being afraid of the dark  She is able to identify a direct connection between her mood and her sleep  Cruz identifies today's mood as a 5/10 for a level of depression  She states that she recently helped a friend who needed to get a pregnancy test from the  [employer]  Cruz went to the store for her friend  She states her anxiety was at about an 8 5/10 while she was in the store  She states that she felt good about completing this task  She states that she often has anxiety in social situations--e g , she has thoughts that people might be thinking about her  She is able to reality test this, and she has the knowledge that this is a distorted thought; however, she continues to feel anxious as a result of these thoughts  She states she believes that she could probably do a similar task again in the future, "because nothing happened "    Cruz continues to express anxiety and concern about her new medication (Celexa)   She states that she read the patient education information and fears interactions with her other medications  This clinician provided some additional information regarding this medication and recommended that she reach out to her provider [facility]) to address her concerns  She states she will try the medication and will report back regarding any side effects or concerns  Cruz discussed her recent increase in symptoms of irritability  She identifies that she recently "got into it" with her grandmother  She states that she felt that her grandmother has been somewhat critical of Cruz's medical concerns in the past  She states that she felt judged by her grandmother at a recent visit  Cruz also discussed a recent minor conflict with her mother  She states this is not a typical concern for her  She connects her irritability with her increase in feelings of depression  Cruz requested a letter that she can provide to her disability insurer  She did sign a release of information for the disability insurer  Shukri Landrum was unsure of the address or specific   She agreed to call back with that information; however, in the meantime, the letter was given to Shukri Landrum for her to forward to the correct person at [employer]  ASSESSMENT: Shukri Landrum presents with a primarily dysthymic mood  Her affect is somewhat constricted  Shukri Landrum exhibits a growing positive therapeutic rapport with the social work intern and this clinician  Cruz appears to have some insight regarding the triggers that increase her symptoms of irritability and anxiety  Shukri Landrum exhibits good judgement with regard to her need to push herself to address her anxiety (as evidenced by her willingness to help her friend)  Shukri Landrum continues to exhibit willingness to work on treatment goals and objectives  PLAN: Shukri Landrum will return in 2 months for the next scheduled session; however, she will remain on the cancellation waiting list for a sooner appointment   Between sessions, Lee Ann Avery will continue to monitor her moods and practice engaging in small activities that trigger anxiety  She will report back during the next session re: successes and barriers  At the next session, this clinician will use client-centered therapy and solution-focused therapy to address her symptoms of anxiety and depression, in an effort to assist Cruz with meeting treatment goals  Behavioral Health Treatment Plan [facility]: Diagnosis and Treatment Plan explained to Karl Panchal relates understanding diagnosis and is agreeable to Treatment Plan   Yes

## 2019-07-18 DIAGNOSIS — G43.709 CHRONIC MIGRAINE WITHOUT AURA WITHOUT STATUS MIGRAINOSUS, NOT INTRACTABLE: ICD-10-CM

## 2019-07-18 NOTE — TELEPHONE ENCOUNTER
Patient requesting refill sent to [facility]   Please sign off if agreeable

## 2019-07-19 ENCOUNTER — TELEPHONE (OUTPATIENT)
Dept: NEUROLOGY | Facility: CLINIC | Age: 29
End: 2019-07-19

## 2019-07-19 RX ORDER — TOPIRAMATE 100 MG/1
100 CAPSULE, EXTENDED RELEASE ORAL DAILY
Qty: 30 CAPSULE | Refills: 5 | Status: SHIPPED | OUTPATIENT
Start: 2019-07-19 | End: 2019-11-26 | Stop reason: ALTCHOICE

## 2019-07-19 NOTE — TELEPHONE ENCOUNTER
Received a fax from [facility] that the patient's Botox claim that was submitted to them on 3/21/2019 was denied  Called [facility] spoke with Rin Phillips- I told him that I received a letter stating the patient's Botox claim was denied and that I wanted to check and see if prior authorization is needed  I provided him with the following codes: Herbie Landrum, 52178, [id number]      54707- no authorization needed  Jose Su through specialty pharmacy medical management  Phone: [phone]    Call reference #: [id number]      Lois Harvey  Specialty pharmacy medical management- there was no option to speak to a live representative- only to leave a message with all authorization information or fax in the information  Authorization information faxed to [phone] on 7/19/19  Will await approval letter

## 2019-07-29 NOTE — TELEPHONE ENCOUNTER
Called to check on status- information was never received  I provided Kimmie Mendes with all the information needed for Botox authorization  I requested this authorization be backdated to 3/21/19- she informed me that she can only back date the authorization 30 days and is unable to use that date  Initiation date will be 7/29/19 for Botox 100 units QTY: 2 under Rosalia Devarinti injected every 3 months- requested 4 visits in 1 year for authorization  Authorization for  is pending and was sent to a pharmacy reviewer   Reference #: [id number]

## 2019-07-31 ENCOUNTER — SOCIAL WORK (OUTPATIENT)
Dept: BEHAVIORAL/MENTAL HEALTH CLINIC | Facility: CLINIC | Age: 29
End: 2019-07-31
Payer: COMMERCIAL

## 2019-07-31 DIAGNOSIS — F33.1 DEPRESSION, MAJOR, RECURRENT, MODERATE (HCC): Primary | ICD-10-CM

## 2019-07-31 DIAGNOSIS — F41.1 GENERALIZED ANXIETY DISORDER: ICD-10-CM

## 2019-07-31 PROCEDURE — 90834 PSYTX W PT 45 MINUTES: CPT | Performed by: SOCIAL WORKER

## 2019-07-31 NOTE — PSYCH
Psychotherapy Provided: Individual Psychotherapy 45 minutes     Length of time in session: 45 minutes, follow up in 6 weeks (Mine Fuller will remain on the cancellation wait list for a sooner appointment)    Goals addressed in session: Goal 1     Pain:      mild-- 2-- "It's pretty good today"  (fibromyalgia)    Current suicide risk : Low     DATA: Met with Cruz for scheduled individual session  Mine Fuller states she went to a concert last week ([employer])  She states she had VIP tickets for herself and her friend  She endorses an increase in symptoms of anxiety the day of the concert, as she was nervous about meeting the band  She states that she took an ativan prior to going to the concert, and she states it helped her to manage her anxiety  She states she has taken ativan a couple of times in the past few weeks to manage her symptoms  Mine Fuller states she has stopped taking klonopin, as she feels that it is not helping her with her sleep and does not effectively manage her anxiety-related symptoms  She states she has a difficult time recollecting the actual time she spent meeting the band members  She states that, overall, she had a very good night  She does report that she had an increase in physical pain, due to having to stand up for the entire duration of the concert  She states she and her friend met some new friends at the concert  Cruz asked some questions regarding blood work ordered by [facility]  She states she is specifically confused by the request for an RPR  This clinician will have a discussion with E[facility] to get more information regarding the reasoning behind requesting this test  We discussed Cruz's medication regimen  She has started the Celexa  She has not yet felt the benefit of the medication, but she has experienced no significant side effects; therefore, she continues to be willing to take the medication   She states that she feels that her previous prescription for ativan works more effectively for her anxiety symptoms  She will discuss this with her prescriber during her next medication management appointment  Cruz discussed some of the activities she uses as healthy coping skills to manage her anxiety and practice relaxation  She states she has been spending some time completing "jewel-art" pictures  She states that the activity is very relaxing but can be somewhat tedious, due to the small size of the gems  She states she has also been attending [employer]  She has the opportunity to attend the class (at the local pool) two times per week  She states it gets her out, gives her some opportunity for exercise, and allows for some socialization  She states, "I am going to be sad when it is over " She identifies that this type of exercise is good for her physical and mental health  She states that she has a friend who might be starting a beginners yoga class  She is considering this as an option  Cruz discussed her progress with disability  She states she might have to continue with the second part of the functional capacity test--which will assess her level of comprehension  She is also waiting to find out if she needs more information regarding her psychiatric treatment to proceed with her SSA application  ASSESSMENT: Benjie Wu presents with a primarily euthymic mood  Her affect is somewhat blunted and congruent with her mood  Benjie Wu exhibits a growing positive rapport with this clinician  Cruz appears to have some insight regarding her symptoms and use of coping skills and medications that help her manage her symptoms  Benjie Wu exhibits good judgement with regard to her decision to engage in low-impact exercise activities  Benjie Wu continues to exhibit willingness to work on treatment goals and objectives         PLAN: Benjie Wu will return in 6 weeks for the next scheduled session; however, she will remain on the cancellation wait list for a sooner appointment  Between sessions, Norma Gusman will continue to maintain adherence with her celexa prescription  She will also continue to practice mindfulness-based strategies to manage her anxiety and will report back during the next session re: successes and barriers  At the next session, this clinician will use mindfulness-based strategies and client-centered therapy to address her anxiety, in an effort to assist Cruz with meeting treatment goals  Behavioral Health Treatment Plan [facility]: Diagnosis and Treatment Plan explained to Violet Gasca relates understanding diagnosis and is agreeable to Treatment Plan   Yes

## 2019-08-02 DIAGNOSIS — G44.59 OTHER COMPLICATED HEADACHE SYNDROME: ICD-10-CM

## 2019-08-02 RX ORDER — SUMATRIPTAN 100 MG/1
100 TABLET, FILM COATED ORAL ONCE AS NEEDED
Qty: 9 TABLET | Refills: 1 | Status: SHIPPED | OUTPATIENT
Start: 2019-08-02 | End: 2019-09-10 | Stop reason: SDUPTHER

## 2019-08-06 NOTE — TELEPHONE ENCOUNTER
Botox approval letter received- letter scanned into the patient's chart under "media"  Authorization information:     Authorization #: [id number]  Valid dates: 8/5/2019 until 8/4/2020- valid for 4 visits (up to 800 units)  Will need to call and verify why this was not backdated to the her appointment on 3/21/2019

## 2019-08-12 ENCOUNTER — TELEPHONE (OUTPATIENT)
Dept: OTHER | Facility: OTHER | Age: 29
End: 2019-08-12

## 2019-08-13 ENCOUNTER — DOCUMENTATION (OUTPATIENT)
Dept: PSYCHIATRY | Facility: CLINIC | Age: 29
End: 2019-08-13

## 2019-08-14 ENCOUNTER — SOCIAL WORK (OUTPATIENT)
Dept: BEHAVIORAL/MENTAL HEALTH CLINIC | Facility: CLINIC | Age: 29
End: 2019-08-14
Payer: COMMERCIAL

## 2019-08-14 DIAGNOSIS — F41.1 GENERALIZED ANXIETY DISORDER: ICD-10-CM

## 2019-08-14 DIAGNOSIS — F33.1 DEPRESSION, MAJOR, RECURRENT, MODERATE (HCC): Primary | ICD-10-CM

## 2019-08-14 PROCEDURE — 90834 PSYTX W PT 45 MINUTES: CPT | Performed by: SOCIAL WORKER
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
## 2019-08-16 DIAGNOSIS — M79.7 FIBROMYALGIA: ICD-10-CM

## 2019-08-16 RX ORDER — PREGABALIN 25 MG/1
25 CAPSULE ORAL 2 TIMES DAILY
Qty: 60 CAPSULE | Refills: 3 | Status: SHIPPED | OUTPATIENT
Start: 2019-08-16 | End: 2019-12-02 | Stop reason: SDUPTHER

## 2019-08-19 ENCOUNTER — OFFICE VISIT (OUTPATIENT)
Dept: FAMILY MEDICINE CLINIC | Facility: CLINIC | Age: 29
End: 2019-08-19
Payer: COMMERCIAL

## 2019-08-19 VITALS
WEIGHT: 167.6 LBS | SYSTOLIC BLOOD PRESSURE: 114 MMHG | DIASTOLIC BLOOD PRESSURE: 90 MMHG | BODY MASS INDEX: 29.7 KG/M2 | HEIGHT: 63 IN

## 2019-08-19 DIAGNOSIS — L24.1 IRRITANT CONTACT DERMATITIS DUE TO OILS: Primary | ICD-10-CM

## 2019-08-19 PROCEDURE — 99213 OFFICE O/P EST LOW 20 MIN: CPT | Performed by: FAMILY MEDICINE

## 2019-08-19 RX ORDER — MOMETASONE FUROATE 1 MG/G
CREAM TOPICAL 2 TIMES DAILY
Qty: 45 G | Refills: 0 | Status: SHIPPED | OUTPATIENT
Start: 2019-08-19 | End: 2019-09-22

## 2019-08-19 NOTE — PROGRESS NOTES
Assessment/Plan:  Guidance given overall  Patient continue with current list of medications as well as using mometasone cream twice daily  Patient use cool compresses  Diagnoses and all orders for this visit:    Irritant contact dermatitis due to oils  -     mometasone (ELOCON) 0 1 % cream; Apply topically 2 (two) times a day            Subjective:        Patient ID: Marcy Beltran is a 30 y o  female  Patient is here with possible poison on bilateral lower extremities  Patient has use calamine lotion  Patient with pruritus  No fever noted  No new topical agents  Patient also used anti-itch lotion  The following portions of the patient's history were reviewed and updated as appropriate: allergies, current medications, past family history, past medical history, past social history, past surgical history and problem list       Review of Systems   Constitutional: Negative  HENT: Negative  Eyes: Negative  Respiratory: Negative  Cardiovascular: Negative  Gastrointestinal: Negative  Endocrine: Negative  Genitourinary: Negative  Musculoskeletal: Negative  Skin: Positive for rash  Allergic/Immunologic: Negative  Neurological: Negative  Hematological: Negative  Psychiatric/Behavioral: Negative  Objective:      BMI Counseling: Body mass index is 29 69 kg/m²  Discussed the patient's BMI with her  The BMI is above average  BMI counseling and education was provided to the patient  Nutrition recommendations include reducing portion sizes  Depression Screening Follow-up Plan: Patient's depression screening was positive with a PHQ-2 score of   Their PHQ-9 score was   Patient assessed for underlying major depression  They have no active suicidal ideations  Brief counseling provided and recommend additional follow-up/re-evaluation next office visit        /90 (BP Location: Left arm, Patient Position: Sitting, Cuff Size: Standard)   Ht 5' 3" (1 6 m)   Wt 76 kg (167 lb 9 6 oz)   BMI 29 69 kg/m²          Physical Exam   Constitutional: She appears well-developed and well-nourished  Cardiovascular: Normal rate, regular rhythm and normal heart sounds  Pulmonary/Chest: Effort normal and breath sounds normal    Skin:   Erythematous papules on lower extremities on feet  Nursing note and vitals reviewed

## 2019-08-23 ENCOUNTER — TELEPHONE (OUTPATIENT)
Dept: NEUROLOGY | Facility: CLINIC | Age: 29
End: 2019-08-23

## 2019-08-23 NOTE — TELEPHONE ENCOUNTER
Patient called back and cannot do that appointment date and time  I offered her the next available on 9/10/2019 at 12:15 pm  Patient accepted this appointment  Aubrey Chavarria,    If Matthewnegrita Lugoi gets any cancellations in [facility] sooner can we please try and move this patient up      Thank you,    Radha Quintero

## 2019-08-23 NOTE — TELEPHONE ENCOUNTER
Spoke with Abraham Stephens in the authorization department- I made him aware I needed to obtain retro authorization for the patient's Botox visit  I provided him the date of service and he states he is unable to do a retro authorization- they can only back date up to 5 days  He states I must call customer service to get additional help  Reference #: [id number]    Spoke with Ramon Quiles in the Claim's department  I provided her with the following dates of service: 12/21/2018, 3/21/2019, and 6/24/2019  I made her aware that I received a letter stating that these dates were not covered  She was able to locate the claims- she states they were denied because: "it is not medically necessary"  She states we will need to submit office visit notes for the following dates of service for the insurance to review and approval if necessary  She states I would need to call the local claims department at [phone] to find out where the medical records will need to go  Reference #: [id number]    Called local claims department spoke with Leandro Mcclellan she states that I would have to submit clinical documentation supporting why the procedure was completed  I made her aware that there are 3 separate dates  She states that I should fax over our cover letter with the 3 different claim numbers with the date of service attached  She states this can take up to 30 days to process through [place]  I did e-mail our billing department to obtain the claim numbers for the date of services listed above  Will await that information- once received will fax to the number below     Fax: [phone]

## 2019-08-23 NOTE — TELEPHONE ENCOUNTER
Called and spoke with the patient- I made her aware that she needs to come in for a follow-up  Nick Koenig is requesting clinical document as to how the patient is doing on her Botox  Patient has not been seen since November of 2018  I offered the patient an appointment with Hannah Holloway on Monday 8/26/2019 in the [facility] location at 56 am  She states she shares a car with her mom and will need to make sure she does not have an appointment at that time  I provided her with my direct number so she can call me back with a decision  Will await her phone call  Leopoldo Brookes,    [employer] [place]      Thanks,    [facility]

## 2019-08-24 ENCOUNTER — OFFICE VISIT (OUTPATIENT)
Dept: URGENT CARE | Facility: HOSPITAL | Age: 29
End: 2019-08-24
Payer: COMMERCIAL

## 2019-08-24 VITALS
SYSTOLIC BLOOD PRESSURE: 127 MMHG | WEIGHT: 168 LBS | BODY MASS INDEX: 29.77 KG/M2 | HEIGHT: 63 IN | OXYGEN SATURATION: 100 % | RESPIRATION RATE: 18 BRPM | HEART RATE: 102 BPM | TEMPERATURE: 99.3 F | DIASTOLIC BLOOD PRESSURE: 91 MMHG

## 2019-08-24 DIAGNOSIS — N30.01 ACUTE CYSTITIS WITH HEMATURIA: Primary | ICD-10-CM

## 2019-08-24 LAB
SL AMB  POCT GLUCOSE, UA: NEGATIVE
SL AMB LEUKOCYTE ESTERASE,UA: ABNORMAL
SL AMB POCT BILIRUBIN,UA: NEGATIVE
SL AMB POCT BLOOD,UA: NEGATIVE
SL AMB POCT CLARITY,UA: CLEAR
SL AMB POCT COLOR,UA: YELLOW
SL AMB POCT KETONES,UA: NEGATIVE
SL AMB POCT NITRITE,UA: NEGATIVE
SL AMB POCT PH,UA: 6.5
SL AMB POCT SPECIFIC GRAVITY,UA: 1.01
SL AMB POCT URINE HCG: NEGATIVE
SL AMB POCT URINE PROTEIN: NEGATIVE
SL AMB POCT UROBILINOGEN: 0.2

## 2019-08-24 PROCEDURE — 81025 URINE PREGNANCY TEST: CPT | Performed by: EMERGENCY MEDICINE

## 2019-08-24 PROCEDURE — 81002 URINALYSIS NONAUTO W/O SCOPE: CPT | Performed by: EMERGENCY MEDICINE

## 2019-08-24 PROCEDURE — 99213 OFFICE O/P EST LOW 20 MIN: CPT | Performed by: EMERGENCY MEDICINE

## 2019-08-24 PROCEDURE — 87086 URINE CULTURE/COLONY COUNT: CPT | Performed by: EMERGENCY MEDICINE

## 2019-08-24 RX ORDER — NITROFURANTOIN 25; 75 MG/1; MG/1
100 CAPSULE ORAL 2 TIMES DAILY
Qty: 10 CAPSULE | Refills: 0 | Status: SHIPPED | OUTPATIENT
Start: 2019-08-24 | End: 2019-08-28

## 2019-08-24 RX ORDER — ERGOCALCIFEROL 1.25 MG/1
1 CAPSULE ORAL WEEKLY
COMMUNITY
Start: 2019-08-19 | End: 2019-08-28

## 2019-08-24 NOTE — PROGRESS NOTES
[facility]        NAME: Adri Macdonald is a 30 y o  female  : 1990    MRN: [id number]  DATE: 2019  TIME: 12:25 PM    Assessment and Plan   Acute cystitis with hematuria [N30 01]  1  Acute cystitis with hematuria  POCT urine dip    POCT urine HCG    Urine culture    nitrofurantoin (MACROBID) 100 mg capsule       POCT Preg:  Negative  Urine dip:  Large WBC's  Culture pending    Patient Instructions       Follow up with PCP in 3-5 days  Proceed to  ER if symptoms worsen  Chief Complaint     Chief Complaint   Patient presents with    Urinary Frequency     Patient c/o frequent urination and burning during urination since yesterday         History of Present Illness       This is a 29-year-old female who arrives with complaint of urgency and frequency which started yesterday afternoon  She has had no fever no back pain no vomiting  She states that she used to get urinary tract infections frequently however has not had 1 in the last year  She has gone through pelvic floor exercises in the past which have helped  She states that Keflex does not work for her urinary tract infections  She also states that [employer] has helped her in the past   She denies pregnancy  Review of Systems   Review of Systems   Constitutional: Negative for chills and fever  Gastrointestinal: Negative for diarrhea and vomiting  Genitourinary: Positive for dysuria, frequency and urgency  Negative for flank pain  Musculoskeletal: Negative for arthralgias           Current Medications       Current Outpatient Medications:     ARIPiprazole (ABILIFY) 2 mg tablet, Take 1 tablet (2 mg total) by mouth daily, Disp: 30 tablet, Rfl: 1    B-D 3CC LUER-DEANN SYR 25GX1" 25G X 1" 3 ML MISC, , Disp: , Rfl:     buPROPion (WELLBUTRIN XL) 150 mg 24 hr tablet, Take 1 tablet (150 mg total) by mouth daily, Disp: 30 tablet, Rfl: 1    ergocalciferol (VITAMIN D2) 50,000 units, Take 1 capsule by mouth once a week, Disp: , Rfl:     ketorolac (TORADOL) 60 mg/2 mL, Inject 1-2 mL (30-60 mg total) into a muscle every 6 (six) hours as needed for moderate pain, Disp: 6 mL, Rfl: 1    mometasone (ELOCON) 0 1 % cream, Apply topically 2 (two) times a day, Disp: 45 g, Rfl: 0    naproxen (NAPROSYN) 500 mg tablet, Take 1 tablet (500 mg total) by mouth as needed for headaches, Disp: 60 tablet, Rfl: 1    onabotulinumtoxin A (BOTOX) 100 units, Inject as directed, Disp: , Rfl:     pregabalin (LYRICA) 25 mg capsule, Take 1 capsule (25 mg total) by mouth 2 (two) times a day, Disp: 60 capsule, Rfl: 3    prochlorperazine (COMPAZINE) 10 mg tablet, Take 1 tablet (10 mg total) by mouth every 6 (six) hours as needed for nausea or vomiting, Disp: 30 tablet, Rfl: 0    SUMAtriptan (IMITREX) 100 mg tablet, Take 1 tablet (100 mg total) by mouth once as needed for migraine for up to 1 dose, Disp: 9 tablet, Rfl: 1    TROKENDI  MG CP24, Take 1 capsule (100 mg total) by mouth daily, Disp: 30 capsule, Rfl: 5    TROKENDI XR 50 MG CP24, Take 1 capsule (50 mg total) by mouth daily (With 100 mg qhs)  , Disp: 30 capsule, Rfl: 2    Cholecalciferol (VITAMIN D) 2000 units CAPS, Take 1 capsule (2,000 Units total) by mouth daily (Patient not taking: Reported on 8/24/2019), Disp: 30 capsule, Rfl: 2    citalopram (CeleXA) 20 mg tablet, Take 1 tablet (20 mg total) by mouth daily (Patient not taking: Reported on 7/2/2019), Disp: 30 tablet, Rfl: 1    clonazePAM (KlonoPIN) 1 mg tablet, Take 1 tablet (1 mg total) by mouth daily (Patient not taking: Reported on 8/24/2019), Disp: 30 tablet, Rfl: 1    etonogestrel-ethinyl estradiol (NUVARING) 0 12-0 015 MG/24HR vaginal ring, Insert ring for 21 days then remove for one week , Disp: 3 each, Rfl: 1    nitrofurantoin (MACROBID) 100 mg capsule, Take 1 capsule (100 mg total) by mouth 2 (two) times a day, Disp: 10 capsule, Rfl: 0    sulfaSALAzine (AZULFIDINE) 500 mg tablet, Take 1 tablet (500 mg total) by mouth 2 (two) times a day (Patient not taking: Reported on 8/24/2019), Disp: 60 tablet, Rfl: 3    Syringe/Needle, Disp, (SYRINGE 3CC/44WN4-0/4") 27G X 1-1/4" 3 ML MISC, Use for IM injection of ketorolac , Disp: 4 each, Rfl: 0    Current Allergies     Allergies as of 08/24/2019 - Reviewed 08/24/2019   Allergen Reaction Noted    Depakote er  [valproic acid]  10/18/2017    Desvenlafaxine  11/26/2012            The following portions of the patient's history were reviewed and updated as appropriate: allergies, current medications, past family history, past medical history, past social history, past surgical history and problem list      Past Medical History:   Diagnosis Date    Abnormal Pap smear of cervix     Anxiety     Arthritis     Depression     Fibromyalgia, primary     Migraine        Past Surgical History:   Procedure Laterality Date    COLONOSCOPY N/A 5/8/2018    Procedure: COLONOSCOPY;  Surgeon: Ulises Lazcano MD;  [facility]: [facility]; Service: Gastroenterology    TONSILLECTOMY      WISDOM TOOTH EXTRACTION         Family History   Problem Relation Age of Onset    Rheum arthritis Mother     Psoriasis Mother     Other Mother     Hypertension Mother     Diabetes unspecified Mother     Sjogren's syndrome Mother     Alcohol abuse Mother     Drug abuse Mother     Anxiety disorder Father     Alcohol abuse Father     Cancer Other     Diabetes Other     Other Other         High blood pressure    Depression Maternal Grandmother          Medications have been verified  Objective   /91   Pulse 102   Temp 99 3 °F (37 4 °C) (Tympanic)   Resp 18   Ht 5' 3" (1 6 m)   Wt 76 2 kg (168 lb)   SpO2 100%   BMI 29 76 kg/m²        Physical Exam     Physical Exam   Constitutional: She is oriented to person, place, and time  She appears well-developed and well-nourished  HENT:   Head: Normocephalic and atraumatic  Eyes: Pupils are equal, round, and reactive to light   Conjunctivae and EOM are normal  Neck: Normal range of motion  Neck supple  Cardiovascular: Normal rate, regular rhythm and normal heart sounds  No murmur heard  Pulmonary/Chest: Effort normal and breath sounds normal    Abdominal: Soft  Bowel sounds are normal  She exhibits no mass  There is no tenderness  There is no guarding  There is no flank pain to palpation bilaterally  Musculoskeletal: Normal range of motion  Neurological: She is alert and oriented to person, place, and time  Skin: Skin is warm and dry  Psychiatric: She has a normal mood and affect  Nursing note and vitals reviewed

## 2019-08-24 NOTE — PATIENT INSTRUCTIONS
1   A culture of your urine is pending  If you need a change in your antibiotic, we will call you and call in a prescription to your pharmacy   2  If worsening symptoms, or onset of fever, chills, vomiting, go to the ER for further evaluation  Urinary Traction Infection in Older Adults   WHAT YOU NEED TO KNOW:   A urinary tract infection (UTI) is caused by bacteria that get inside your urinary tract  Your urinary tract includes your kidneys, ureters, bladder, and urethra  Urine is made in your kidneys, and it flows from the ureters to the bladder  Urine leaves the bladder through the urethra  A UTI is more common in your lower urinary tract, which includes your bladder and urethra  DISCHARGE INSTRUCTIONS:   Return to the emergency department if:   · You are urinating very little or not at all  · You are vomiting  · You have a high fever with shaking chills  · You have side or back pain that gets worse  Contact your healthcare provider if:   · You have a fever  · You are a woman and you have increased white or yellow discharge from your vagina  · You do not feel better after 2 days of taking antibiotics  · You have questions or concerns about your condition or care  Medicines:   · Medicines  help treat the bacterial infection or decrease pain and burning when you urinate  You may also need medicines to decrease the urge to urinate often  Your healthcare provider may recommend cranberry juice or cranberry supplements to help decrease your symptoms  · Take your medicine as directed  Contact your healthcare provider if you think your medicine is not helping or if you have side effects  Tell him or her if you are allergic to any medicine  Keep a list of the medicines, vitamins, and herbs you take  Include the amounts, and when and why you take them  Bring the list or the pill bottles to follow-up visits  Carry your medicine list with you in case of an emergency    Self-care: · Urinate when you feel the urge  Do not hold your urine because bacteria can grow in the bladder if urine stays in the bladder too long  It may be helpful to urinate at least every 3 to 4 hours  · Drink liquids as directed  Liquids can help flush bacteria from your urinary tract  Ask how much liquid to drink each day and which liquids are best for you  You may need to drink more liquids than usual to help flush out the bacteria  Do not drink alcohol, caffeine, and citrus juices  These can irritate your bladder and increase your symptoms  · Apply heat  on your abdomen for 20 to 30 minutes every 2 hours for as many days as directed  Heat helps decrease discomfort and pressure in your bladder  Prevent a UTI:   · Women should wipe front to back  after urinating or having a bowel movement  This may prevent germs from getting into the urinary tract  · Urinate after you have sex  to flush away bacteria that can enter your urinary tract during sex  · Wear cotton underwear and clothes that fit loose  Tight pants and nylon underwear can trap moisture and cause bacteria to grow  Follow up with your healthcare provider as directed:  Write down your questions so you remember to ask them during your visits  © 2017 [employer] Information is for End User's use only and may not be sold, redistributed or otherwise used for commercial purposes  All illustrations and images included in CareNotes® are the copyrighted property of A D A M [employer] Inc  or Jorje Carrero  The above information is an  only  It is not intended as medical advice for individual conditions or treatments  Talk to your doctor, nurse or pharmacist before following any medical regimen to see if it is safe and effective for you

## 2019-08-25 LAB — BACTERIA UR CULT: NORMAL

## 2019-08-28 ENCOUNTER — OFFICE VISIT (OUTPATIENT)
Dept: FAMILY MEDICINE CLINIC | Facility: CLINIC | Age: 29
End: 2019-08-28
Payer: COMMERCIAL

## 2019-08-28 VITALS
TEMPERATURE: 98.8 F | DIASTOLIC BLOOD PRESSURE: 82 MMHG | HEIGHT: 63 IN | WEIGHT: 170 LBS | BODY MASS INDEX: 30.12 KG/M2 | SYSTOLIC BLOOD PRESSURE: 122 MMHG

## 2019-08-28 DIAGNOSIS — R41.3 MEMORY LOSS: ICD-10-CM

## 2019-08-28 DIAGNOSIS — M79.672 CHRONIC HEEL PAIN, LEFT: Primary | ICD-10-CM

## 2019-08-28 DIAGNOSIS — G89.29 CHRONIC HEEL PAIN, LEFT: Primary | ICD-10-CM

## 2019-08-28 DIAGNOSIS — F41.1 GENERALIZED ANXIETY DISORDER: ICD-10-CM

## 2019-08-28 DIAGNOSIS — M79.7 FIBROMYALGIA SYNDROME: ICD-10-CM

## 2019-08-28 PROCEDURE — 3008F BODY MASS INDEX DOCD: CPT | Performed by: FAMILY MEDICINE

## 2019-08-28 PROCEDURE — 1036F TOBACCO NON-USER: CPT | Performed by: FAMILY MEDICINE

## 2019-08-28 PROCEDURE — 99214 OFFICE O/P EST MOD 30 MIN: CPT | Performed by: FAMILY MEDICINE

## 2019-08-28 RX ORDER — ALPRAZOLAM 0.25 MG/1
0.25 TABLET ORAL 2 TIMES DAILY PRN
Qty: 30 TABLET | Refills: 0 | Status: SHIPPED | OUTPATIENT
Start: 2019-08-28 | End: 2020-05-06

## 2019-08-28 RX ORDER — CHOLECALCIFEROL (VITAMIN D3) 1250 MCG
CAPSULE ORAL WEEKLY
COMMUNITY
End: 2021-07-16

## 2019-08-28 NOTE — PROGRESS NOTES
Assessment/Plan:  Patient use ice, stretching, arch supports and NSAIDs as needed for left heel pain  Patient will try Xanax for panic attacks  Patient is trying medical marijuana shortly  Patient will follow with Psychiatry, Neurology patient follow-up with Rheumatology also for fibro       Diagnoses and all orders for this visit:    Chronic heel pain, left    Generalized anxiety disorder  -     ALPRAZolam (XANAX) 0 25 mg tablet; Take 1 tablet (0 25 mg total) by mouth 2 (two) times a day as needed for anxiety    Memory loss    Fibromyalgia syndrome    Other orders  -     Cholecalciferol (VITAMIN D3) 03352 units CAPS; Take by mouth once a week            Subjective:        Patient ID: Rebecca Foster is a 30 y o  female  Patient is here to follow-up on anxiety  Patient started seeing psychiatrist   Patient going to new psychiatrist in November  Patient was placed on Celexa  Patient had more anxiety associated with this medication  Patient with decreased appetite for 2 weeks  Patient felt jittery also  Patient stop medication  Patient not using clonazepam due to the weight makes her feel  The patient is having more frequent panic attacks  The patient has stress associated with the disability as well as ex  Patient does see Rheumatology  Patient also with some financial issues  Patient under more stress related to this  Patient has anxiety as my the cabinet doors in my office are out of alignment  Patient also with anxiety as to where she collazo in relationship to my office  Patient with some heel pain on the left  The following portions of the patient's history were reviewed and updated as appropriate: allergies, current medications, past family history, past medical history, past social history, past surgical history and problem list       Review of Systems   Constitutional: Negative  HENT: Negative  Eyes: Negative  Respiratory: Negative  Cardiovascular: Negative  Gastrointestinal: Negative  Endocrine: Negative  Genitourinary: Negative  Musculoskeletal: Positive for arthralgias and back pain  Skin: Negative  Allergic/Immunologic: Negative  Neurological: Negative  Hematological: Negative  Psychiatric/Behavioral: The patient is nervous/anxious  Objective:      BMI Counseling: Body mass index is 30 11 kg/m²  Discussed the patient's BMI with her  The BMI is above average  BMI counseling and education was provided to the patient  Nutrition recommendations include reducing portion sizes  /82 (BP Location: Right arm, Patient Position: Sitting, Cuff Size: Adult)   Temp 98 8 °F (37 1 °C) (Tympanic)   Ht 5' 3" (1 6 m)   Wt 77 1 kg (170 lb)   BMI 30 11 kg/m²          Physical Exam   Constitutional: She appears well-developed and well-nourished  Cardiovascular: Normal rate, regular rhythm and normal heart sounds  Pulmonary/Chest: Effort normal and breath sounds normal    Neurological: She is alert  Psychiatric: Her behavior is normal  Judgment and thought content normal    Anxious   Nursing note and vitals reviewed

## 2019-08-30 ENCOUNTER — SOCIAL WORK (OUTPATIENT)
Dept: BEHAVIORAL/MENTAL HEALTH CLINIC | Facility: CLINIC | Age: 29
End: 2019-08-30
Payer: COMMERCIAL

## 2019-08-30 DIAGNOSIS — F41.1 GENERALIZED ANXIETY DISORDER: ICD-10-CM

## 2019-08-30 DIAGNOSIS — F33.1 DEPRESSION, MAJOR, RECURRENT, MODERATE (HCC): Primary | ICD-10-CM

## 2019-08-30 PROCEDURE — 90834 PSYTX W PT 45 MINUTES: CPT | Performed by: SOCIAL WORKER

## 2019-09-04 NOTE — TELEPHONE ENCOUNTER
Called patient to offer sooner afternoon appointment today with rosalinda Chi for pt to cb      Appointment slot on hold for patient today in [facility] at 3:15 PM

## 2019-09-10 ENCOUNTER — TELEPHONE (OUTPATIENT)
Dept: NEUROLOGY | Facility: CLINIC | Age: 29
End: 2019-09-10

## 2019-09-10 ENCOUNTER — OFFICE VISIT (OUTPATIENT)
Dept: NEUROLOGY | Facility: CLINIC | Age: 29
End: 2019-09-10
Payer: COMMERCIAL

## 2019-09-10 VITALS
BODY MASS INDEX: 31 KG/M2 | HEART RATE: 83 BPM | WEIGHT: 175 LBS | SYSTOLIC BLOOD PRESSURE: 124 MMHG | DIASTOLIC BLOOD PRESSURE: 86 MMHG

## 2019-09-10 DIAGNOSIS — R53.82 CHRONIC FATIGUE: ICD-10-CM

## 2019-09-10 DIAGNOSIS — G47.01 INSOMNIA DUE TO MEDICAL CONDITION: ICD-10-CM

## 2019-09-10 DIAGNOSIS — G43.709 CHRONIC MIGRAINE WITHOUT AURA WITHOUT STATUS MIGRAINOSUS, NOT INTRACTABLE: Primary | ICD-10-CM

## 2019-09-10 PROBLEM — G44.59 OTHER COMPLICATED HEADACHE SYNDROME: Status: ACTIVE | Noted: 2019-09-10

## 2019-09-10 PROCEDURE — 99214 OFFICE O/P EST MOD 30 MIN: CPT | Performed by: PHYSICIAN ASSISTANT

## 2019-09-10 RX ORDER — UREA 10 %
LOTION (ML) TOPICAL
Qty: 60 TABLET | Refills: 2 | Status: SHIPPED | OUTPATIENT
Start: 2019-09-10 | End: 2022-05-18

## 2019-09-10 RX ORDER — SUMATRIPTAN 100 MG/1
100 TABLET, FILM COATED ORAL ONCE AS NEEDED
Qty: 9 TABLET | Refills: 1 | Status: SHIPPED | OUTPATIENT
Start: 2019-09-10 | End: 2019-11-20 | Stop reason: SDUPTHER

## 2019-09-10 RX ORDER — TOPIRAMATE 25 MG/1
CAPSULE, EXTENDED RELEASE ORAL
Qty: 42 CAPSULE | Refills: 0 | Status: SHIPPED | OUTPATIENT
Start: 2019-09-10 | End: 2019-11-26 | Stop reason: ALTCHOICE

## 2019-09-10 NOTE — TELEPHONE ENCOUNTER
Received claim numbers from the billing department  Patient is being seen in follow-up today with Alicia Nassar   Once note is completed will submit for appeal      Claim numbers:    12/21/18- [id number]- closed  3/21/19- [id number]  6/24/19- [id number]

## 2019-09-10 NOTE — PATIENT INSTRUCTIONS
Venlafaxine? Decrease trokendi to 100 mg at night until you start aimovig  If Delford  is helpful will start to wean trokendi xr    Melatonin- 2- 12 mg at bed

## 2019-09-10 NOTE — PROGRESS NOTES
Patient ID: Christina Cooper is a 30 y o  female  Assessment/Plan:       Problem List Items Addressed This Visit        Cardiovascular and Mediastinum    Chronic migraine without aura without status migrainosus, not intractable - Primary    Relevant Medications    Erenumab-aooe 140 MG/ML SOAJ    SUMAtriptan (IMITREX) 100 mg tablet    melatonin 1 mg    TROKENDI XR 25 MG CP24       Other    Chronic fatigue    Insomnia due to medical condition    Relevant Medications    melatonin 1 mg           She was agreeable to Aimovig  S/e reviewed  She will continue botox injections q3 months  Since starting botox, the patient reports greater than 7 days of migraine relief from baseline, correlated with headache diary, decreased abortive medication use and decreased ER visits  She does not find trokendi XR helpful for the migraines  She did not want to increase the dose, in fact asked to d/c it  I told her to first start [address] before weaning Trokendi XR  If aimovig seems to decrease migraine frequency/ severity, she can then begin to wean trokendi XR  Continues imitrex PO prn migraine onset, toradol injectable if that fails  Aqua therapy scheduled soon for fibromyalgia  Melatonin recommended for insomnia  Of note, the patient cannot work at this time until symptoms are more controlled, including rheumatologic symptoms and migraine headaches  The patient should not hesitate to call me prior to her follow up with any questions or concerns  The patient was instructed to urgently call 911 or present to the nearest emergency room with any new or worsening neurological deficits  Subjective:    HPI    Ms  Christina Cooper is a pleasant 31 yo right handed female who presents for neurological follow-up for chronic migraines  She has ankylosing spondylitis and fibromyalgia     Recently d/c sulfasalazine and is taking medical marijuana for rheum pain   She obtains this from Dr Yusra Richards- family doctor of her uncle     She continues botox q3 months and finds it very helpful  Since starting botox, the patient reports greater than 7 days of migraine relief from baseline, correlated with headache diary, decreased abortive medication use and decreased ER visits      She is not working now and trying for a third time to obtain disability  She continues to have significant fatigue 2/2 insomnia, as well as migraine headaches  Although migraines are improved with botox, she could use more control  She gets migraines in clusters of 2 5 weeks at a time at the beginning of the month always, and the migraines resolve for the last 1 5 weeks, then start again at the beginning of the next month  She cannot ID any clear trigger for this, and cannot ID why the migraines worsen at the beginning of the month only  The botox does help to decrease migraines at the beginning of the month thankfully  Continues with counseling and psychiatry on a regular basis for anxiety and depression  She recently started celexa per psychiatry and weaned from it on her own as this worsened her anxiety  She states it also decreased her appetite significantly  She continues to have insomnia in that she falls alseep with some difficulty and then wakes up after a few hours and then cannot fall back asleep  Denies LAM per sleep study, denies RLS  Continues to have mild memory loss and word-finding issues, which improved a little bit with change from topamax to trokendi xr  She stops mid sentence because she does not remember what she is trying to say  Continues toradol injection prn severe migraine  She also finds benefit from imitrex  mg, states nasal spray imitrex caused bad taste in her mouth  The following portions of the patient's history were reviewed and updated as appropriate:   She  has a past medical history of Abnormal Pap smear of cervix, Anxiety, Arthritis, Depression, Fibromyalgia, primary, and Migraine    She   Patient Active Problem List    Diagnosis Date Noted    Other complicated headache syndrome 09/10/2019    Insomnia due to medical condition 09/10/2019    Chronic heel pain, left 08/28/2019    Irritant contact dermatitis due to oils 08/19/2019    Depression, major, recurrent, moderate (HCC) 07/01/2019    URI (upper respiratory infection) 04/24/2019    Hand dermatitis 04/12/2019    Intractable migraine with aura without status migrainosus 01/11/2019    Sleep disturbance 11/27/2018    Snoring 11/27/2018    Fibromyalgia syndrome 11/27/2018    Obesity (BMI 30-39 9) 11/27/2018    Upper respiratory infection 11/13/2018    Dental infection 09/12/2018    Possible pregnancy 07/26/2018    Migraine with aura and with status migrainosus 07/23/2018    Cognitive decline 06/27/2018    Memory loss 06/15/2018    Chronic fatigue 05/24/2018    Alternating constipation and diarrhea 04/17/2018    Abnormal bowel habits 04/17/2018    Chronic migraine without aura without status migrainosus, not intractable 03/13/2018    Anterior chest wall pain 02/22/2018    Migraine headache 06/22/2017    Cervical radiculitis 06/10/2016    Seasonal allergies 03/24/2015    Lumbar radiculopathy 01/05/2015    Vitamin B12 deficiency 07/30/2014    Hyperlipidemia 03/24/2014    Vitamin D deficiency 02/26/2014    Generalized anxiety disorder 11/19/2012     She  has a past surgical history that includes Tonsillectomy; Richey tooth extraction; and Colonoscopy (N/A, 5/8/2018)  Her family history includes Alcohol abuse in her father and mother; Anxiety disorder in her father; Cancer in her other; Depression in her maternal grandmother; Diabetes in her other; Diabetes unspecified in her mother; Drug abuse in her mother; Hypertension in her mother; Other in her mother and other; Psoriasis in her mother; Rheum arthritis in her mother; Sjogren's syndrome in her mother  She  reports that she has never smoked   She has never used smokeless tobacco  She reports that she has current or past drug history  Drug: Marijuana  She reports that she does not drink alcohol  Current Outpatient Medications   Medication Sig Dispense Refill    ALPRAZolam (XANAX) 0 25 mg tablet Take 1 tablet (0 25 mg total) by mouth 2 (two) times a day as needed for anxiety 30 tablet 0    B-D 3CC LUER-DEANN SYR 25GX1" 25G X 1" 3 ML MISC       Cholecalciferol (VITAMIN D3) 94149 units CAPS Take by mouth once a week      etonogestrel-ethinyl estradiol (NUVARING) 0 12-0 015 MG/24HR vaginal ring Insert ring for 21 days then remove for one week   3 each 1    ketorolac (TORADOL) 60 mg/2 mL Inject 1-2 mL (30-60 mg total) into a muscle every 6 (six) hours as needed for moderate pain 6 mL 1    naproxen (NAPROSYN) 500 mg tablet Take 1 tablet (500 mg total) by mouth as needed for headaches 60 tablet 1    onabotulinumtoxin A (BOTOX) 100 units Inject as directed      pregabalin (LYRICA) 25 mg capsule Take 1 capsule (25 mg total) by mouth 2 (two) times a day 60 capsule 3    prochlorperazine (COMPAZINE) 10 mg tablet Take 1 tablet (10 mg total) by mouth every 6 (six) hours as needed for nausea or vomiting 30 tablet 0    SUMAtriptan (IMITREX) 100 mg tablet Take 1 tablet (100 mg total) by mouth once as needed for migraine for up to 1 dose 9 tablet 1    Syringe/Needle, Disp, (SYRINGE 3CC/38UV1-0/4") 27G X 1-1/4" 3 ML MISC Use for IM injection of ketorolac  4 each 0    TROKENDI  MG CP24 Take 1 capsule (100 mg total) by mouth daily (Patient taking differently: Take 150 mg by mouth daily ) 30 capsule 5    ARIPiprazole (ABILIFY) 2 mg tablet Take 1 tablet (2 mg total) by mouth daily 30 tablet 1    buPROPion (WELLBUTRIN XL) 150 mg 24 hr tablet Take 1 tablet (150 mg total) by mouth daily 30 tablet 1    Erenumab-aooe 140 MG/ML SOAJ Inject 140 mg under the skin every 30 (thirty) days 1 pen 2    melatonin 1 mg 1-2 tabs qhs prn insomnia 60 tablet 2    TROKENDI XR 25 MG CP24 After starting aimovig: 3 caps qhs x 1 week, then 2 caps qhs x 1 week, then 1 cap qhs x 1 week, then stop  42 capsule 0     No current facility-administered medications for this visit  She is allergic to depakote er  [valproic acid] and desvenlafaxine            Objective:    Blood pressure 124/86, pulse 83, weight 79 4 kg (175 lb)  Physical Exam    Neurological Exam  Vital signs reviewed  Well developed, well nourished  Speech is fluent and articulate, very mild word finding issues  Head: Normocephalic, atraumatic  Neck: Neck flexors 5/5  CN 2-12: intact and symmetric, including EOMs which are normal b/l and PERRL  Fundi b/l are normal to crude ophthalmological examination  MSK: 5/5 t/o  ROM normal x all 4 extr  No pronator drift  Sensation: Inact to LT and temp x4 extr  Romberg negative  Reflexes: 2+ and symmetric in all 4 extr  Coordination: Nml x4 extr  Gait: Steady normal gait  ROS:    Review of Systems   Constitutional: Negative  Negative for appetite change and fever  HENT: Negative  Negative for hearing loss, tinnitus, trouble swallowing and voice change  Eyes: Negative  Negative for photophobia and pain  Respiratory: Negative  Negative for shortness of breath  Cardiovascular: Negative  Negative for palpitations  Gastrointestinal: Negative  Negative for nausea and vomiting  Endocrine: Negative  Negative for cold intolerance and heat intolerance  Genitourinary: Negative  Negative for dysuria, frequency and urgency  Musculoskeletal: Negative  Negative for myalgias and neck pain  Skin: Negative  Negative for rash  Neurological: Positive for headaches  Negative for dizziness, tremors, seizures, syncope, facial asymmetry, speech difficulty, weakness, light-headedness and numbness  Hematological: Negative  Does not bruise/bleed easily  Psychiatric/Behavioral: Negative  Negative for confusion, hallucinations and sleep disturbance       The following portions of the patient's history were reviewed and updated as appropriate: allergies, current medications/ medication history, past family history, past medical history, past social history, past surgical history and problem list     Review of systems was reviewed and otherwise unremarkable from a neurological perspective

## 2019-09-11 ENCOUNTER — SOCIAL WORK (OUTPATIENT)
Dept: BEHAVIORAL/MENTAL HEALTH CLINIC | Facility: CLINIC | Age: 29
End: 2019-09-11
Payer: COMMERCIAL

## 2019-09-11 DIAGNOSIS — F41.1 GENERALIZED ANXIETY DISORDER: Primary | ICD-10-CM

## 2019-09-11 DIAGNOSIS — F33.1 DEPRESSION, MAJOR, RECURRENT, MODERATE (HCC): ICD-10-CM

## 2019-09-11 PROCEDURE — 90834 PSYTX W PT 45 MINUTES: CPT | Performed by: SOCIAL WORKER

## 2019-09-11 NOTE — PSYCH
Psychotherapy Provided: Individual Psychotherapy 45 minutes     Length of time in session: 45 minutes, follow up in 2 week    Goals addressed in session: Goal 1     Pain:      moderate to severe-- 5-- Cruz reports she is having a "fibro-flare" and her pain rating is higher than normal    Current suicide risk : Low     DATA: Met with Cruz for scheduled individual session  Cruz reports that she started having an increase in pain from her fibromyalgia  She reports that the increase in her pain level has increased her need to sleep  She did go to her friend's house to dog-sit  She reports that she tried to be somewhat active with the dog; however, she states that she needed to take frequent naps  Gregbruna did receive her medical [facility] card on September 3rd  She was able to go to the dispensary and has used the products to help her manage her pain  She states that she has noticed an improvement in her pain when she uses it, but her pain is still at a higher level than normal  She is not happy with the product that she got this time, and she plans to change products at her next visit to the dispensary  Sarah Bethdonavan did meet with a dispensary pharmacist the first time she went, so she could receive recommendations based on her diagnosis and her current medication regimen  Cruz did discuss her recent visit to her neurologist  She has been having an increase in migraines (up to 10x per month)  She is switching to an injectable preventative medication from an oral preventative  She states that the side effects from this medication are very low  She is happy to be able to use this medication and be able to cut down the number of pills she is taking  She reports she has met with a SSA  who will be reviewing all of her documentation regarding her medical issues  Her neurologist has stated that she is currently unable to work, due to both neurologic and rheumatologic symptoms   Cruz states that she misses working and wants to be able to work  She states that she has difficulty explaining some of her symptoms and reasons for not working to friends her own age  She states, "People just don't get it " She discussed one friend who calls her a hypochondriac and thinks that she is making it all up  She states this is upsetting to her, because he is one of her best friends, and he has had addiction issues  She states that he told her that she is "addicted to sickness " Shayna Molina states that she is concerned that if she is too aggressive with sticking up for herself that she will risk having some of her friends getting angry with her  Cruz discussed some of her family relationships and family dynamics  She states that there is some conflict within her extended family; however, she continues to get support from her grandmother and her mother  ASSESSMENT: Shayna Molina presents with a dysthymic mood  Her affect is constricted and mood-congruent  Shayna Molina exhibits a positive therapeutic rapport with this clinician  Cruz appears to have normal insight and judgment  Shayna Molina continues to exhibit willingness to work on treatment goals and objectives  PLAN: Shayna Molina will return in 2 weeks for the next scheduled session  Between sessions, Shayna Molina will continue to practice her mindfulness-based strategies and distress tolerance skills and will report back during the next session re: successes and barriers  At the next session, this clinician will use client-centered therapy, mindfulness-based strategies, CBT techniques and solution-focused therapy to address Jomars anxiety and depression, in an effort to assist Cruz with meeting treatment goals  Behavioral Health Treatment Plan [facility]: Diagnosis and Treatment Plan explained to Matthew Ana relates understanding diagnosis and is agreeable to Treatment Plan   Yes

## 2019-09-11 NOTE — TELEPHONE ENCOUNTER
Patient was seen in follow-up on 9/10/2019  Office note is completed  Appeal faxed to [facility] on 9/11/19  Will await decision letter

## 2019-09-21 ENCOUNTER — HOSPITAL ENCOUNTER (EMERGENCY)
Facility: HOSPITAL | Age: 29
Discharge: HOME | End: 2019-09-21
Payer: COMMERCIAL

## 2019-09-21 VITALS
WEIGHT: 180 LBS | BODY MASS INDEX: 31.89 KG/M2 | DIASTOLIC BLOOD PRESSURE: 77 MMHG | SYSTOLIC BLOOD PRESSURE: 123 MMHG | HEIGHT: 63 IN | TEMPERATURE: 97.7 F | HEART RATE: 78 BPM | RESPIRATION RATE: 16 BRPM | OXYGEN SATURATION: 99 %

## 2019-09-21 DIAGNOSIS — G43.909 MIGRAINE HEADACHE: Primary | ICD-10-CM

## 2019-09-21 PROCEDURE — 99283 EMERGENCY DEPT VISIT LOW MDM: CPT

## 2019-09-21 PROCEDURE — 96375 TX/PRO/DX INJ NEW DRUG ADDON: CPT

## 2019-09-21 PROCEDURE — 96361 HYDRATE IV INFUSION ADD-ON: CPT

## 2019-09-21 PROCEDURE — 96374 THER/PROPH/DIAG INJ IV PUSH: CPT

## 2019-09-21 RX ORDER — DEXAMETHASONE SODIUM PHOSPHATE 4 MG/ML
10 INJECTION, SOLUTION INTRA-ARTICULAR; INTRALESIONAL; INTRAMUSCULAR; INTRAVENOUS; SOFT TISSUE ONCE
Status: COMPLETED | OUTPATIENT
Start: 2019-09-21 | End: 2019-09-21

## 2019-09-21 RX ORDER — METOCLOPRAMIDE 10 MG/1
10 TABLET ORAL EVERY 6 HOURS
Qty: 12 TABLET | Refills: 0 | Status: SHIPPED | OUTPATIENT
Start: 2019-09-21 | End: 2019-09-24

## 2019-09-21 RX ORDER — DIPHENHYDRAMINE HYDROCHLORIDE 50 MG/ML
50 INJECTION INTRAMUSCULAR; INTRAVENOUS ONCE
Status: COMPLETED | OUTPATIENT
Start: 2019-09-21 | End: 2019-09-21

## 2019-09-21 RX ORDER — METOCLOPRAMIDE HYDROCHLORIDE 5 MG/ML
10 INJECTION INTRAMUSCULAR; INTRAVENOUS ONCE
Status: COMPLETED | OUTPATIENT
Start: 2019-09-21 | End: 2019-09-21

## 2019-09-21 RX ADMIN — METOCLOPRAMIDE 10 MG: 5 INJECTION, SOLUTION INTRAMUSCULAR; INTRAVENOUS at 09:00

## 2019-09-21 RX ADMIN — SODIUM CHLORIDE 1000 ML: 0.9 INJECTION, SOLUTION INTRAVENOUS at 08:50

## 2019-09-21 RX ADMIN — DIPHENHYDRAMINE HYDROCHLORIDE 50 MG: 50 INJECTION INTRAMUSCULAR; INTRAVENOUS at 09:02

## 2019-09-21 RX ADMIN — DEXAMETHASONE SODIUM PHOSPHATE 10 MG: 4 INJECTION, SOLUTION INTRAMUSCULAR; INTRAVENOUS at 09:07

## 2019-09-21 NOTE — ED PROVIDER NOTES
History  Chief Complaint   Patient presents with    Migraine     Anna Sullivan is a 67-year-old female with a known history of migraines in the past, came to the emergency department due to headache which started 2 days prior to arrival   Pain is exacerbated by light and noise and slightly relieved by rest and closing her eyes  Patient has tried Toradol last night with no significant improvement of the symptoms noted  History provided by:  Patient and parent   used: No    Migraine   Location:  Generalized  Quality:  Aching pain  Severity:  Moderate  Onset quality:  Gradual  Duration:  2 days  Timing:  Constant  Progression:  Worsening  Chronicity:  Recurrent  Associated symptoms: headaches    Associated symptoms: no abdominal pain, no chest pain, no congestion, no cough, no diarrhea, no ear pain, no fatigue, no fever, no loss of consciousness, no myalgias, no nausea, no rash, no rhinorrhea, no shortness of breath, no sore throat, no vomiting and no wheezing    Headaches:     Severity:  Moderate    Onset quality:  Gradual    Duration:  2 days    Timing:  Constant    Progression:  Worsening    Chronicity:  Recurrent  Risk factors:  History of migraine      Prior to Admission Medications   Prescriptions Last Dose Informant Patient Reported? Taking?    ALPRAZolam (XANAX) 0 25 mg tablet   No Yes   Sig: Take 1 tablet (0 25 mg total) by mouth 2 (two) times a day as needed for anxiety   ARIPiprazole (ABILIFY) 2 mg tablet   No Yes   Sig: Take 1 tablet (2 mg total) by mouth daily   B-D 3CC LUER-DEANN SYR 25GX1" 25G X 1" 3 ML MISC  Self Yes No   Cholecalciferol (VITAMIN D3) 84897 units CAPS  Self Yes Yes   Sig: Take by mouth once a week   Erenumab-aooe 140 MG/ML SOAJ   No Yes   Sig: Inject 140 mg under the skin every 30 (thirty) days   SUMAtriptan (IMITREX) 100 mg tablet   No Yes   Sig: Take 1 tablet (100 mg total) by mouth once as needed for migraine for up to 1 dose   Syringe/Needle, Disp, (SYRINGE 3CC/94JD2-9/4") 27G X 1-" 3 ML MISC   No No   Sig: Use for IM injection of ketorolac  TROKENDI  MG CP24   No Yes   Sig: Take 1 capsule (100 mg total) by mouth daily   Patient taking differently: Take 150 mg by mouth daily    TROKENDI XR 25 MG CP24 Not Taking at Unknown time  No No   Sig: After starting aimovig: 3 caps qhs x 1 week, then 2 caps qhs x 1 week, then 1 cap qhs x 1 week, then stop  Patient not taking: Reported on 2019   buPROPion (WELLBUTRIN XL) 150 mg 24 hr tablet   No Yes   Sig: Take 1 tablet (150 mg total) by mouth daily   etonogestrel-ethinyl estradiol (NUVARING) 0 12-0 015 MG/24HR vaginal ring   No Yes   Sig: Insert ring for 21 days then remove for one week    ketorolac (TORADOL) 60 mg/2 mL 2019 at Unknown time  No Yes   Sig: Inject 1-2 mL (30-60 mg total) into a muscle every 6 (six) hours as needed for moderate pain   melatonin 1 mg   No Yes   Si-2 tabs qhs prn insomnia   mometasone (ELOCON) 0 1 % cream Not Taking at Unknown time  No No   Sig: Apply topically 2 (two) times a day   Patient not taking: Reported on 2019   naproxen (NAPROSYN) 500 mg tablet  Self No Yes   Sig: Take 1 tablet (500 mg total) by mouth as needed for headaches   onabotulinumtoxin A (BOTOX) 100 units  Self Yes Yes   Sig: Inject as directed   pregabalin (LYRICA) 25 mg capsule   No Yes   Sig: Take 1 capsule (25 mg total) by mouth 2 (two) times a day   prochlorperazine (COMPAZINE) 10 mg tablet  Self No Yes   Sig: Take 1 tablet (10 mg total) by mouth every 6 (six) hours as needed for nausea or vomiting      Facility-Administered Medications: None       Past Medical History:   Diagnosis Date    Abnormal Pap smear of cervix     Anxiety     Arthritis     Depression     Fibromyalgia, primary     Migraine        Past Surgical History:   Procedure Laterality Date    COLONOSCOPY N/A 2018    Procedure: COLONOSCOPY;  Surgeon: Niels Hong MD;  Location: [facility];   Service: Gastroenterology  TONSILLECTOMY      WISDOM TOOTH EXTRACTION         Family History   Problem Relation Age of Onset    Rheum arthritis Mother     Psoriasis Mother     Other Mother     Hypertension Mother     Diabetes unspecified Mother     Sjogren's syndrome Mother     Alcohol abuse Mother     Drug abuse Mother     Anxiety disorder Father     Alcohol abuse Father     Cancer Other     Diabetes Other     Other Other         High blood pressure    Depression Maternal Grandmother      I have reviewed and agree with the history as documented  Social History     Tobacco Use    Smoking status: Never Smoker    Smokeless tobacco: Never Used   Substance Use Topics    Alcohol use: No    Drug use: Yes     Types: Marijuana     Comment: Medical marijuana        Review of Systems   Constitutional: Negative for fatigue and fever  HENT: Negative for congestion, ear pain, rhinorrhea and sore throat  Eyes: Negative  Respiratory: Negative for cough, shortness of breath and wheezing  Cardiovascular: Negative for chest pain  Gastrointestinal: Negative for abdominal pain, diarrhea, nausea and vomiting  Endocrine: Negative  Genitourinary: Negative  Musculoskeletal: Negative for myalgias  Skin: Negative for rash  Allergic/Immunologic: Negative  Neurological: Positive for headaches  Negative for seizures, loss of consciousness, syncope, speech difficulty, weakness and numbness  Hematological: Negative  Psychiatric/Behavioral: The patient is nervous/anxious  Physical Exam  Physical Exam   Constitutional: She is oriented to person, place, and time  She appears well-developed and well-nourished  No distress  HENT:   Head: Normocephalic and atraumatic  Right Ear: External ear normal    Left Ear: External ear normal    Nose: Nose normal    Mouth/Throat: Oropharynx is clear and moist  No oropharyngeal exudate  Eyes: Pupils are equal, round, and reactive to light   Conjunctivae and EOM are normal  Right eye exhibits no discharge  Left eye exhibits no discharge  No scleral icterus  Neck: Normal range of motion  Neck supple  No tracheal deviation present  No thyromegaly present  Cardiovascular: Normal rate, regular rhythm and normal heart sounds  Pulmonary/Chest: Effort normal and breath sounds normal  No respiratory distress  Abdominal: Soft  Bowel sounds are normal  She exhibits no distension  There is no tenderness  Musculoskeletal: Normal range of motion  She exhibits no edema, tenderness or deformity  Lymphadenopathy:     She has no cervical adenopathy  Neurological: She is alert and oriented to person, place, and time  No cranial nerve deficit or sensory deficit  She exhibits normal muscle tone  Coordination normal    Skin: Skin is warm and dry  No rash noted  She is not diaphoretic  No erythema  No pallor  Psychiatric: She has a normal mood and affect  Her behavior is normal  Judgment and thought content normal    Nursing note and vitals reviewed        Vital Signs  ED Triage Vitals [09/21/19 0833]   Temperature Pulse Respirations Blood Pressure SpO2   97 7 °F (36 5 °C) 78 16 123/77 99 %      Temp Source Heart Rate Source Patient Position - Orthostatic VS BP Location FiO2 (%)   Temporal Monitor -- -- --      Pain Score       7           Vitals:    09/21/19 0833   BP: 123/77   Pulse: 78         Visual Acuity  Visual Acuity      Most Recent Value   L Pupil Size (mm)  3          ED Medications  Medications   sodium chloride 0 9 % bolus 1,000 mL (1,000 mL Intravenous New Bag 9/21/19 0850)   metoclopramide (REGLAN) injection 10 mg (10 mg Intravenous Given 9/21/19 0900)   diphenhydrAMINE (BENADRYL) injection 50 mg (50 mg Intravenous Given 9/21/19 0902)   dexamethasone (DECADRON) injection 10 mg (10 mg Intravenous Given 9/21/19 3716)       Diagnostic Studies  Results Reviewed     None                 No orders to display              Procedures  Procedures       ED Course  ED Course as of Sep 21 8519   Sat Sep 21, 2019   8767 Patient is laying comfortably on the stretcher with her mother at the bedside  Patient states that she is feeling much better and would like to go home  MDM  Number of Diagnoses or Management Options  Migraine headache: established and worsening     Amount and/or Complexity of Data Reviewed  Decide to obtain previous medical records or to obtain history from someone other than the patient: yes  Obtain history from someone other than the patient: yes  Review and summarize past medical records: yes  Independent visualization of images, tracings, or specimens: yes    Risk of Complications, Morbidity, and/or Mortality  Presenting problems: minimal  Management options: minimal    Patient Progress  Patient progress: improved      Disposition  Final diagnoses:   Migraine headache     Time reflects when diagnosis was documented in both MDM as applicable and the Disposition within this note     Time User Action Codes Description Comment    9/21/2019  9:47 AM Rodney Ganser Add [G43 909] Migraine headache       ED Disposition     ED Disposition Condition Date/Time Comment    Discharge Stable Sat Sep 21, 2019  9:47 AM Sheryle Prost discharge to home/self care  Follow-up Information     Follow up With Specialties Details Why Contact Info    Caroline Dasilva DO Family Medicine In 3 days  [facility]  [phone]            Patient's Medications   Discharge Prescriptions    METOCLOPRAMIDE (REGLAN) 10 MG TABLET    Take 1 tablet (10 mg total) by mouth every 6 (six) hours for 12 doses As needed for nausea and/or vomiting       Start Date: 9/21/2019 End Date: 9/24/2019       Order Dose: 10 mg       Quantity: 12 tablet    Refills: 0     No discharge procedures on file      ED Provider  Electronically Signed by           Romulo Maxwell MD  09/21/19 2662

## 2019-09-22 ENCOUNTER — HOSPITAL ENCOUNTER (EMERGENCY)
Facility: HOSPITAL | Age: 29
Discharge: HOME | End: 2019-09-22
Attending: EMERGENCY MEDICINE | Admitting: EMERGENCY MEDICINE
Payer: COMMERCIAL

## 2019-09-22 VITALS
HEART RATE: 77 BPM | DIASTOLIC BLOOD PRESSURE: 75 MMHG | TEMPERATURE: 98 F | SYSTOLIC BLOOD PRESSURE: 141 MMHG | WEIGHT: 175.71 LBS | BODY MASS INDEX: 31.13 KG/M2 | RESPIRATION RATE: 16 BRPM | OXYGEN SATURATION: 99 %

## 2019-09-22 DIAGNOSIS — G43.909 MIGRAINE HEADACHE: Primary | ICD-10-CM

## 2019-09-22 PROCEDURE — 93005 ELECTROCARDIOGRAM TRACING: CPT

## 2019-09-22 PROCEDURE — 96375 TX/PRO/DX INJ NEW DRUG ADDON: CPT

## 2019-09-22 PROCEDURE — 96361 HYDRATE IV INFUSION ADD-ON: CPT

## 2019-09-22 PROCEDURE — 99283 EMERGENCY DEPT VISIT LOW MDM: CPT

## 2019-09-22 PROCEDURE — 96374 THER/PROPH/DIAG INJ IV PUSH: CPT

## 2019-09-22 PROCEDURE — 99284 EMERGENCY DEPT VISIT MOD MDM: CPT | Performed by: EMERGENCY MEDICINE

## 2019-09-22 RX ORDER — METOCLOPRAMIDE HYDROCHLORIDE 5 MG/ML
10 INJECTION INTRAMUSCULAR; INTRAVENOUS ONCE
Status: COMPLETED | OUTPATIENT
Start: 2019-09-22 | End: 2019-09-22

## 2019-09-22 RX ORDER — KETOROLAC TROMETHAMINE 30 MG/ML
15 INJECTION, SOLUTION INTRAMUSCULAR; INTRAVENOUS ONCE
Status: COMPLETED | OUTPATIENT
Start: 2019-09-22 | End: 2019-09-22

## 2019-09-22 RX ORDER — DIPHENHYDRAMINE HYDROCHLORIDE 50 MG/ML
50 INJECTION INTRAMUSCULAR; INTRAVENOUS ONCE
Status: COMPLETED | OUTPATIENT
Start: 2019-09-22 | End: 2019-09-22

## 2019-09-22 RX ADMIN — KETOROLAC TROMETHAMINE 15 MG: 30 INJECTION, SOLUTION INTRAMUSCULAR at 12:34

## 2019-09-22 RX ADMIN — METOCLOPRAMIDE 10 MG: 5 INJECTION, SOLUTION INTRAMUSCULAR; INTRAVENOUS at 12:38

## 2019-09-22 RX ADMIN — SODIUM CHLORIDE 500 ML: 0.9 INJECTION, SOLUTION INTRAVENOUS at 12:36

## 2019-09-22 RX ADMIN — DIPHENHYDRAMINE HYDROCHLORIDE 50 MG: 50 INJECTION, SOLUTION INTRAMUSCULAR; INTRAVENOUS at 12:36

## 2019-09-22 NOTE — ED PROVIDER NOTES
History  Chief Complaint   Patient presents with    Headache     with headache x4 days sensitivity to light and sound     31 yo female with longstanding h/o migraines, she manages with home regimen prescribed by Neurologist, including sumatriptan, and toradol injx, c/o onset of typical migraine about 4-5 days ago, did not fully break with her usual regimen, last tried dose of toradol at home yesterday, then sought care at [facility] ED, where she reported what she thought was going to be enough improvement after treatment the reglan, benadryl  But it recurred again  She recalls ED treatment with toradol and reglan have helped, although she wasn't given it in ED  I suggested likely because she had already dosed herself at home so it was too soon to consider another dose  History provided by:  Patient  Headache - Recurrent or Known Dx Migraines   Pain location:  Frontal  Quality:  Dull  Radiates to:  Does not radiate  Onset quality:  Gradual  Duration:  5 days  Timing:  Constant  Progression:  Waxing and waning  Chronicity:  Recurrent  Similar to prior headaches: yes    Context: bright light    Relieved by: temporarily improved with ED treatment yesterday  Worsened by:  Light and sound  Ineffective treatments:  Prescription medications  Associated symptoms: nausea and photophobia    Associated symptoms: no fever, no hearing loss, no loss of balance, no neck pain, no neck stiffness, no seizures and no vomiting        Prior to Admission Medications   Prescriptions Last Dose Informant Patient Reported? Taking?    ALPRAZolam (XANAX) 0 25 mg tablet   No Yes   Sig: Take 1 tablet (0 25 mg total) by mouth 2 (two) times a day as needed for anxiety   ARIPiprazole (ABILIFY) 2 mg tablet   No Yes   Sig: Take 1 tablet (2 mg total) by mouth daily   B-D 3CC LUER-DEANN SYR 25GX1" 25G X 1" 3 ML MISC  Self Yes Yes   Cholecalciferol (VITAMIN D3) 50753 units CAPS  Self Yes Yes   Sig: Take by mouth once a week   Erenumab-aooe 140 MG/ML SOAJ   No Yes   Sig: Inject 140 mg under the skin every 30 (thirty) days   SUMAtriptan (IMITREX) 100 mg tablet   No Yes   Sig: Take 1 tablet (100 mg total) by mouth once as needed for migraine for up to 1 dose   Syringe/Needle, Disp, (SYRINGE 3CC/43GF5-7/4") 27G X 1-4" 3 ML MISC   No No   Sig: Use for IM injection of ketorolac  TROKENDI  MG CP24   No Yes   Sig: Take 1 capsule (100 mg total) by mouth daily   Patient taking differently: Take 150 mg by mouth daily    TROKENDI XR 25 MG CP24   No Yes   Sig: After starting aimovig: 3 caps qhs x 1 week, then 2 caps qhs x 1 week, then 1 cap qhs x 1 week, then stop     buPROPion (WELLBUTRIN XL) 150 mg 24 hr tablet   No Yes   Sig: Take 1 tablet (150 mg total) by mouth daily   etonogestrel-ethinyl estradiol (NUVARING) 0 12-0 015 MG/24HR vaginal ring   No Yes   Sig: Insert ring for 21 days then remove for one week    ketorolac (TORADOL) 60 mg/2 mL   No Yes   Sig: Inject 1-2 mL (30-60 mg total) into a muscle every 6 (six) hours as needed for moderate pain   melatonin 1 mg   No Yes   Si-2 tabs qhs prn insomnia   metoclopramide (REGLAN) 10 mg tablet   No No   Sig: Take 1 tablet (10 mg total) by mouth every 6 (six) hours for 12 doses As needed for nausea and/or vomiting   naproxen (NAPROSYN) 500 mg tablet  Self No Yes   Sig: Take 1 tablet (500 mg total) by mouth as needed for headaches   onabotulinumtoxin A (BOTOX) 100 units More than a month at Unknown time Self Yes No   Sig: Inject as directed   pregabalin (LYRICA) 25 mg capsule   No Yes   Sig: Take 1 capsule (25 mg total) by mouth 2 (two) times a day   prochlorperazine (COMPAZINE) 10 mg tablet  Self No Yes   Sig: Take 1 tablet (10 mg total) by mouth every 6 (six) hours as needed for nausea or vomiting      Facility-Administered Medications: None       Past Medical History:   Diagnosis Date    Abnormal Pap smear of cervix     Anxiety     Arthritis     Depression     Fibromyalgia, primary     Migraine Past Surgical History:   Procedure Laterality Date    COLONOSCOPY N/A 5/8/2018    Procedure: COLONOSCOPY;  Surgeon: Kamla Marshall MD;  Location: [facility]; Service: Gastroenterology    TONSILLECTOMY      WISDOM TOOTH EXTRACTION         Family History   Problem Relation Age of Onset    Rheum arthritis Mother     Psoriasis Mother     Other Mother     Hypertension Mother     Diabetes unspecified Mother     Sjogren's syndrome Mother     Alcohol abuse Mother     Drug abuse Mother     Anxiety disorder Father     Alcohol abuse Father     Cancer Other     Diabetes Other     Other Other         High blood pressure    Depression Maternal Grandmother      I have reviewed and agree with the history as documented  Social History     Tobacco Use    Smoking status: Never Smoker    Smokeless tobacco: Never Used   Substance Use Topics    Alcohol use: No    Drug use: Yes     Types: Marijuana     Comment: Medical marijuana        Review of Systems   Constitutional: Negative for fever  HENT: Negative for hearing loss  Eyes: Positive for photophobia  Gastrointestinal: Positive for nausea  Negative for vomiting  Musculoskeletal: Negative for neck pain and neck stiffness  Neurological: Negative for seizures and loss of balance  All other systems reviewed and are negative  Physical Exam  Physical Exam   Constitutional: She is oriented to person, place, and time  Vital signs are normal  She appears well-developed and well-nourished  Non-toxic appearance  Prefers to sit in darkened room with sunglasses, but otherwise nontoxic, alert, no focal deficits   HENT:   Head: Normocephalic and atraumatic  Right Ear: Tympanic membrane and external ear normal    Left Ear: Tympanic membrane and external ear normal    Nose: Nose normal    Mouth/Throat: Oropharynx is clear and moist    Eyes: Pupils are equal, round, and reactive to light   Conjunctivae and EOM are normal    Neck: Normal range of motion and full passive range of motion without pain  Neck supple  No Brudzinski's sign and no Kernig's sign noted  Cardiovascular: Normal rate, regular rhythm, normal heart sounds, intact distal pulses and normal pulses  No murmur heard  Pulmonary/Chest: Effort normal and breath sounds normal  No tachypnea  No respiratory distress  She has no wheezes  Abdominal: Soft  Bowel sounds are normal  She exhibits no distension  There is no tenderness  There is no rigidity, no rebound and no guarding  Musculoskeletal: Normal range of motion  Right lower leg: She exhibits no swelling  Left lower leg: She exhibits no swelling  Lymphadenopathy:     She has no cervical adenopathy  Neurological: She is alert and oriented to person, place, and time  She has normal strength and normal reflexes  No cranial nerve deficit or sensory deficit  Coordination and gait normal  GCS eye subscore is 4  GCS verbal subscore is 5  GCS motor subscore is 6  Skin: Skin is warm and dry  Capillary refill takes less than 2 seconds  No rash noted  She is not diaphoretic  No pallor  Psychiatric: She has a normal mood and affect  Her speech is normal and behavior is normal  Judgment and thought content normal  Cognition and memory are normal    Nursing note and vitals reviewed        Vital Signs  ED Triage Vitals [09/22/19 1140]   Temperature Pulse Respirations Blood Pressure SpO2   98 °F (36 7 °C) 77 16 141/75 99 %      Temp Source Heart Rate Source Patient Position - Orthostatic VS BP Location FiO2 (%)   Oral Monitor Sitting Right arm --      Pain Score       7           Vitals:    09/22/19 1140   BP: 141/75   Pulse: 77   Patient Position - Orthostatic VS: Sitting         Visual Acuity  Visual Acuity      Most Recent Value   R Pupil Size (mm)  6          ED Medications  Medications   ketorolac (TORADOL) injection 15 mg (15 mg Intravenous Given 9/22/19 1234)   metoclopramide (REGLAN) injection 10 mg (10 mg Intravenous Given 9/22/19 1238)   diphenhydrAMINE (BENADRYL) injection 50 mg (50 mg Intravenous Given 9/22/19 1236)   sodium chloride 0 9 % bolus 500 mL (0 mL Intravenous Stopped 9/22/19 1327)       Diagnostic Studies  Results Reviewed     None                 No orders to display              Procedures  Procedures       ED Course  ED Course as of Sep 22 1328   Sun Sep 22, 2019   1327 She reports near resolution of HA and feels ready to be discharged  MDM    Disposition  Final diagnoses:   Migraine headache     Time reflects when diagnosis was documented in both MDM as applicable and the Disposition within this note     Time User Action Codes Description Comment    9/22/2019  1:28 PM Edra Och Add [G43 909] Migraine headache       ED Disposition     ED Disposition Condition Date/Time Comment    Discharge Good Sun Sep 22, 2019  1:28 PM Jovani Russell discharge to home/self care  Follow-up Information     Follow up With Specialties Details Why Contact Info Additional [facility] [address] [place] [address] Neurology Schedule an appointment as soon as possible for a visit  As needed [address] [facility] [address] [phone] [facility] [place] [address] [place] [address],  [address] [place] [address], [facility] [place], [place], [address]          Patient's Medications   Discharge Prescriptions    No medications on file     No discharge procedures on file      ED Provider  Electronically Signed by           Tony Albert MD  09/22/19 5362

## 2019-09-23 LAB
ATRIAL RATE: 92 BPM
P AXIS: 47 DEGREES
PR INTERVAL: 160 MS
QRS AXIS: 65 DEGREES
QRSD INTERVAL: 90 MS
QT INTERVAL: 356 MS
QTC INTERVAL: 440 MS
T WAVE AXIS: 54 DEGREES
VENTRICULAR RATE: 92 BPM

## 2019-09-23 PROCEDURE — 93010 ELECTROCARDIOGRAM REPORT: CPT | Performed by: INTERNAL MEDICINE

## 2019-09-25 ENCOUNTER — SOCIAL WORK (OUTPATIENT)
Dept: BEHAVIORAL/MENTAL HEALTH CLINIC | Facility: CLINIC | Age: 29
End: 2019-09-25
Payer: COMMERCIAL

## 2019-09-25 DIAGNOSIS — G47.01 INSOMNIA DUE TO MEDICAL CONDITION: ICD-10-CM

## 2019-09-25 DIAGNOSIS — F33.1 DEPRESSION, MAJOR, RECURRENT, MODERATE (HCC): Primary | ICD-10-CM

## 2019-09-25 DIAGNOSIS — F41.1 GENERALIZED ANXIETY DISORDER: ICD-10-CM

## 2019-09-25 PROCEDURE — 90834 PSYTX W PT 45 MINUTES: CPT | Performed by: SOCIAL WORKER

## 2019-09-25 NOTE — PSYCH
Psychotherapy Provided: Individual Psychotherapy 45 minutes     Length of time in session: 45 minutes, follow up in 1 month (client will remain on cancellation wait list for a sooner appointment)    Goals addressed in session: Goal 1     Pain:      Mild--2-- fibromyalgia pain and migraine pain    Current suicide risk : Low     DATA: Met with Cruz for scheduled individual session  "I've been stressing " Over the past several days, Cruz reports that she has had a very severe migraine  She went to the ED two times in the past week  She states that the headache is resolving, but she still has aphasia and brain fog  She states she has not had one that has lasted this long in a while; however, she finds it to be very stressful  Cruz endorses an increase in her symptoms of depression when she has migraines and the after-effects  She states, "I just feel useless " She states that she feels that she is unable to participate in daily life activities when she is recovering from her headaches  The clinician discussed having some acceptance of her current status and giving herself some compassion when she is struggling with her physical health  She states, "I don't want to accept it " Cruz also states that her financial situation is very anxiety producing for her  She states that she looked at her savings account and realized that she has been spending more money than she had known  She does state that she has purchased some luxury items (e g , a concert and getting her nails done)  Her mother pays for some of her expenses  She is concerned about preparing for the holidays  Cruz spent significant time discussing her relationships with her friends  She expressed some feelings of hurt and sadness regarding being overlooked by some of her friends  Farhan Pierre gave an example of one of her friends, who is getting   She states she was not invited to the bachelorette party or the bridal shower   She does not know why she was not invited, but she attributes it to her physical health issues interfering with her ability to socialize with them on a regular basis  The clinician offered some potential ways to reframe some of the various situations  Cruz discussed a recent connection she made with an old friend from high school  She states they had a nice time, and she is glad that they had the opportunity to reconnect  She talked about going to the mall with this friend and not wanting to be impacted by her physical health issues  The first store that they went to was [employer]  "My anxiety kicked in  Just deal with it  It will be over soon " She did not disclose her migraines, and she went into the store and pretended to be enjoying her time in that store, while secretly being concerned that the scents could trigger a migraine  The therapist validated her feelings and also provided a possible alternative scenario of what could happen if she was honest with her friend  Cruz reports her use of medical marijuana does not help with her headaches; however, she reports significant improvement       ASSESSMENT: Amanda Melchor presents with a dysthymic mood  Her affect is normal and mood-congruent  She was tearful at times during the session  Amanda Melchor exhibits a positive therapeutic rapport with this clinician  Cruz appears to have normal insight and judgment  Amanda Melchor continues to exhibit willingness to work on treatment goals and objectives  PLAN: Amanda Melchor will return in one month for the next scheduled session; however, she will remain on the cancellation wait list for a sooner appointment  Between sessions, Amanda Melchor will continue to practice her mindfulness skills and distress tolerance skills and will report back during the next session re: successes and barriers   At the next session, this clinician will use client-centered therapy, mindfulness-based strategies, DBT-informed skills, CBT techniques and solution-focused therapy to address her anxiety, in an effort to assist Sarah Bethdonavan with meeting treatment goals  Behavioral Health Treatment Plan [facility]: Diagnosis and Treatment Plan explained to Cruz Borja relates understanding diagnosis and is agreeable to Treatment Plan   Yes

## 2019-09-27 NOTE — TELEPHONE ENCOUNTER
Patient called in regards to a letter she received [facility] made her aware that I have already submitted an appeal for those dates  I am awaiting a response from [facility] in regards to the submission  I made her aware that she is all set for her upcoming Botox injection- patient was very thankful  Patient is aware that if I have any problems I will give her a call-patient verbally understood

## 2019-09-30 DIAGNOSIS — M79.7 FIBROMYALGIA: ICD-10-CM

## 2019-09-30 DIAGNOSIS — F41.1 GENERALIZED ANXIETY DISORDER: ICD-10-CM

## 2019-09-30 RX ORDER — BUPROPION HYDROCHLORIDE 150 MG/1
150 TABLET ORAL DAILY
Qty: 30 TABLET | Refills: 1 | Status: SHIPPED | OUTPATIENT
Start: 2019-09-30 | End: 2019-11-26 | Stop reason: SDUPTHER

## 2019-09-30 RX ORDER — ARIPIPRAZOLE 2 MG/1
2 TABLET ORAL DAILY
Qty: 30 TABLET | Refills: 1 | Status: SHIPPED | OUTPATIENT
Start: 2019-09-30 | End: 2019-11-26 | Stop reason: SDUPTHER

## 2019-09-30 NOTE — TELEPHONE ENCOUNTER
Patient is requesting refills of Abilify and Wellbutrin to go to [facility]  Have placed order for approval  Thank you

## 2019-10-02 ENCOUNTER — PROCEDURE VISIT (OUTPATIENT)
Dept: NEUROLOGY | Facility: CLINIC | Age: 29
End: 2019-10-02
Payer: COMMERCIAL

## 2019-10-02 VITALS — HEART RATE: 86 BPM | TEMPERATURE: 98.1 F | SYSTOLIC BLOOD PRESSURE: 117 MMHG | DIASTOLIC BLOOD PRESSURE: 75 MMHG

## 2019-10-02 DIAGNOSIS — G43.709 CHRONIC MIGRAINE WITHOUT AURA WITHOUT STATUS MIGRAINOSUS, NOT INTRACTABLE: Primary | ICD-10-CM

## 2019-10-02 PROCEDURE — 64615 CHEMODENERV MUSC MIGRAINE: CPT | Performed by: PHYSICIAN ASSISTANT

## 2019-10-02 NOTE — PROGRESS NOTES
Chemodenervation  Date/Time: 10/2/2019 10:11 AM  Performed by: Logan Stephenson PA-C  Authorized by: Logan Stephenson PA-C     Pre-procedure details:     Prepped With: Alcohol    Procedure details:     Position:  Upright  Botox:     Botox Type:  Type A    Brand:  Botox    mL's of Botulinum Toxin:  155    Final Concentration per CC:  50 units    Needle Gauge:  30 G 2 5 inch  Procedures:     Botox Procedures: chronic headache      Indications: migraines    Injection Location:     Head / Face:  L , R , L frontalis, R frontalis, R inferior cervical paraspinal, L inferior cervical paraspinal, L medial occipitalis, R medial occipitalis, L lateral occipitalis, R lateral occipitalis, procerus, L temporalis, R temporalis, R superior trapezius and L superior trapezius    L  injection amount:  5 unit(s)    R  injection amount:  5 unit(s)    L lateral frontalis:  5 unit(s)    R lateral frontalis:  5 unit(s)    L medial frontalis:  5 unit(s)    R medial frontalis:  5 unit(s)    L temporalis injection amount:  20 unit(s)    R temporalis injection amount:  20 unit(s)    Procerus injection amount:  5 unit(s)    L lateral occipitalis injection amount:  5 unit(s)    R lateral occipitalis injection amount:  5 unit(s)    L medial occipitalis injection amount:  10 unit(s)    R medial occipitalis injection amount:  10 unit(s)    L inferior cervical paraspinal injection amount:  10 unit(s)    R inferior cervical paraspinal injection amount:  10 unit(s)    L superior trapezius injection amount:  0 unit(s)    R superior trapezius injection amount:  0 unit(s)  Total Units:     Total units used:  155    Total units discarded:  45  Post-procedure details:     Chemodenervation:  Chronic migraine    Facial Nerve Location[de-identified]  Bilateral facial nerve    Patient tolerance of procedure:   Tolerated well, no immediate complications  Comments:      Extra of 30 units in the bilateral temporoparietal regions/scalp/headband region, medically necessary  Trapezius muscles were avoided        Vitals:    10/02/19 0746   BP: 117/75   Pulse: 86   Temp: 98 1 °F (36 7 °C)     Still waiting for aimovig approval

## 2019-10-07 ENCOUNTER — PATIENT MESSAGE (OUTPATIENT)
Dept: NEUROLOGY | Facility: CLINIC | Age: 29
End: 2019-10-07

## 2019-10-12 ENCOUNTER — PATIENT MESSAGE (OUTPATIENT)
Dept: NEUROLOGY | Facility: CLINIC | Age: 29
End: 2019-10-12

## 2019-10-14 ENCOUNTER — TELEPHONE (OUTPATIENT)
Dept: NEUROLOGY | Facility: CLINIC | Age: 29
End: 2019-10-14

## 2019-10-14 NOTE — TELEPHONE ENCOUNTER
----- Message from Calhoun Koc[place], [place] sent at 10/14/2019  8:25 AM EDT -----  Regarding: Non-Urgent Medical Question  Contact: [phone]  Raina Bosotn - do you know what the insurance company is waiting for our office to send?    ----- Message -----  From: Joy Paez  Sent: 10/12/2019  12:18 PM EDT  To: [facility]  Subject: Non-Urgent Medical Question                      Hi Curlene Nyhan checked with my pharmacy about my Amovig and they said they are waiting on your office to send something to the insurance company saying why I need the medication  Then we can move forward      Thanks,  [employer]

## 2019-10-15 NOTE — TELEPHONE ENCOUNTER
Called pharmacy to verify what is needed  Pharmacist was on the other line and unable to take call  Tech will have pharmacist call office back

## 2019-10-16 NOTE — TELEPHONE ENCOUNTER
Pharmacist called back med requires PA    [facility] [id number]    [address]  [id number]  ID [id number]    Pa submitted on CMM, awaiting question set

## 2019-10-16 NOTE — TELEPHONE ENCOUNTER
PA submitted through [facility]  Macdonald: Georgina KHALIL approved through 10/15/20       Notified patient and pharmacy of approval

## 2019-10-23 ENCOUNTER — SOCIAL WORK (OUTPATIENT)
Dept: BEHAVIORAL/MENTAL HEALTH CLINIC | Facility: CLINIC | Age: 29
End: 2019-10-23
Payer: COMMERCIAL

## 2019-10-23 ENCOUNTER — TELEPHONE (OUTPATIENT)
Dept: NEUROLOGY | Facility: CLINIC | Age: 29
End: 2019-10-23

## 2019-10-23 DIAGNOSIS — F41.1 GENERALIZED ANXIETY DISORDER: ICD-10-CM

## 2019-10-23 DIAGNOSIS — F33.1 DEPRESSION, MAJOR, RECURRENT, MODERATE (HCC): Primary | ICD-10-CM

## 2019-10-23 PROCEDURE — 90834 PSYTX W PT 45 MINUTES: CPT | Performed by: SOCIAL WORKER

## 2019-10-23 NOTE — PSYCH
Psychotherapy Provided: Individual Psychotherapy 45 minutes     Length of time in session: 45 minutes, follow up in 2 weeks    Goals addressed in session: Goal 1     Pain:      Baseline anxiety and pain about a 4    Current suicide risk : Low     DATA: Met with Cruz for scheduled individual session  She has been doing well and managing her anxiety with only one use of her Xanax since the last appointment  However her migraines have increased as she has been weaning off of her pervious medication and is now taking a monthly injectable medication  She has been coping with the migraines which have been bearable  She has not needed to take her rescue medication  This clinician and Cruz discussed her means of coping and treatment plan  This clinician pointed out reported increased positive mood being reported by Cruz when she discusses that she has socialized with friends, gets out more often, and does something physical, like the aqua aerobics class she had taken in the summer  This clinician discussed Ernst Jones trying to incorporate these activities as often as possible during the week and logging her progress  This clinician and Ernst Jones discussed how increased physical activity sometimes is very difficult with chronic pain and depression but how those who are able to incorporate it typically report improvement in both pain and mood  Cruz agreed when she is able to get out of the home she can better cope with her migraines  She said she had been doing better at this in the past but there are times that she gives up with it  She enjoys walking and swimming  Cruz also agrees that using mindfulness activities regularly is something she would eleuterio to do  She reported wanting to do these daily and discussed some apps and videos available through [employer]  She finds them helpful  Cruz stated she wants to get back into going to the cat shelter as well     She will be attending a wedding this weekend for a friend and plans to see how she does with the reception portion  Normally she would skip this as the music and social environment triggers her anxiety and migraines but she is willing to see how it goes  She is no longer upset that the friend and her family did not invite her to the bridal shower  Cruz's disability claim with [employer] remains pending  She now has to sign releases for all her providers to send records to her  because he cannot obtain them from [address] [place] [address]  She completed a release in this session for her   Cruz feels her medications have been working well and she reports improved sleep with the use of medical marijuana  We spent some time reviewing and revising Cruz's treatment plan  She states that she feels very comfortable with the therapeutic relationship  She wants to continue to focus on building her mindfulness-based skills to help her maintain her moods and decrease her anxiety  ASSESSMENT: Petra Dennis presents with a mostly euthymic mood  Affect   Petra Dennis exhibits good rapport with this clinician  Cruz appears to have normal insight and judgment  Petra Dennis continues to exhibit willingness to work on treatment goals and objectives  PLAN: Petra Dennis will return in 2 weeks for the next scheduled session  Between sessions, Petra Dennis will continue to  and will report back during the next session re: successes and barriers  At the next session, this clinician will use client-centered therapy, mindfulness-based strategies, DBT-informed skills and CBT techniques to address Jomars mood, in an effort to assist Cruz with meeting treatment goals  Behavioral Health Treatment Plan [facility]: Diagnosis and Treatment Plan explained to Arun Doll relates understanding diagnosis and is agreeable to Treatment Plan   Yes

## 2019-10-23 NOTE — TELEPHONE ENCOUNTER
Request for letter of medical necessity for appeal of claim denial received from [address] [place] [address] billing department  Date of Service 6/24/19 for Botox inj with Umberto Avelar at [facility] office  Please advise  See document attached to this encounter

## 2019-10-23 NOTE — BH TREATMENT PLAN
Alice Cintron  1990       Date of Initial Treatment Plan: June 26, 2019   Date of Current Treatment Plan: 10/23/19    Treatment Plan Number 2     Strengths/Personal Resources for Self Care: Use of existing positive coping skills; Insight into personal limits; healthy support system; perseverance; consistent attendance    Diagnosis:   1  Depression, major, recurrent, moderate ([address] )     2  Generalized anxiety disorder         Area of Needs: Anxiety and depression      Long Term Goal 1: "I want to be able to manage my depression and be very mindful of my anxiety "    Target Date: February 20, 2020  Completion Date: to be determined         Short Term Objectives for Goal 1:      1  Jory Bess will identify triggers and prompting events that increase symptoms of anxiety and depression   2  Jory Bess will learn and exhibit understanding of a minimum of three distress tolerance skills and emotion regulation skills to assist with symptom reduction   3  Cruz will increase her daily physical activity to a duration of 15-30 minutes (including walking, swimming, stretching, etc )   4  Cruz will increase her social interactions (outside of her home) to a minimum of 3x per week   5  Jory Bess will learn and utilize formal mindfulness-based strategies a minimum of 10 minutes every day   6  Jory Bess will maintain a level of anxiety that does not surpass a 4/10 on most days  Incidents that surpass this limit will be process in therapy sessions  GOAL 1: Modality: Individual 1-2 x per month   Completion Date to be determined, Medication Management and The person(s) responsible for carrying out the plan is  Dr Chris Larson (psychiatrist)    Clinician will use client-centered therapy, mindfulness-based strategies, DBT-informed skills, CBT techniques and solution-focused therapy to address Cruz's symptoms of anxiety and depression   Idyllwildstacey Bess will practice skills between sessions and will report back, during subsequent sessions regarding successes and barriers  Behavioral Health Treatment Plan [facility]: Diagnosis and Treatment Plan explained to Moon Gregory relates understanding diagnosis and is agreeable to Treatment Plan  Client Comments : Please share your thoughts, feelings, need and/or experiences regarding your treatment plan: "I think that's legit " "I love it here   I was nervous that I wasn't going to like it, but I feel very comfortable "

## 2019-10-25 NOTE — TELEPHONE ENCOUNTER
Called [facility] spoke with Oracio Tay in the claims department- I advised her I was calling to check on the status of an appeal  I provided her with the patient's ID number and   I provided her with the denied claim dates  She states no appeal has been received  She states I must call my local [employer] to find out where the appeal information needs to be sent   Phone: [phone]  Call reference #: [id number]

## 2019-10-28 ENCOUNTER — TELEPHONE (OUTPATIENT)
Dept: NEUROLOGY | Facility: CLINIC | Age: 29
End: 2019-10-28

## 2019-10-28 NOTE — TELEPHONE ENCOUNTER
received a fax from [facility] stating that trokendi xr 100mg PA is about to   per office note-She does not find trokendi XR helpful for the migraines  She did not want to increase the dose, in fact asked to d/c it  I told her to first start [address] before weaning Trokendi XR  If aimovig seems to decrease migraine frequency/ severity, she can then begin to wean trokendi XR     i called pt and confirmed that she is weaning off trokendi    no need to do PA

## 2019-10-31 NOTE — TELEPHONE ENCOUNTER
Per Derick- will write a letter of medical necessity for all three dates with need appealed   Once letter is written will forward to [employer] to complete the appeal

## 2019-11-01 NOTE — TELEPHONE ENCOUNTER
Letter of medical necessity written for the following dates of service: 12/21/2018, 3/21/2019 and 6/24/2019  Awaiting Dr Nubia Adair signature  Once letters are signed will submit for an appeal through [employer]        Fax #: [phone]

## 2019-11-05 ENCOUNTER — TELEPHONE (OUTPATIENT)
Dept: BEHAVIORAL/MENTAL HEALTH CLINIC | Facility: CLINIC | Age: 29
End: 2019-11-05

## 2019-11-06 ENCOUNTER — SOCIAL WORK (OUTPATIENT)
Dept: BEHAVIORAL/MENTAL HEALTH CLINIC | Facility: CLINIC | Age: 29
End: 2019-11-06
Payer: COMMERCIAL

## 2019-11-06 DIAGNOSIS — F33.1 DEPRESSION, MAJOR, RECURRENT, MODERATE (HCC): Primary | ICD-10-CM

## 2019-11-06 PROCEDURE — 90834 PSYTX W PT 45 MINUTES: CPT | Performed by: SOCIAL WORKER

## 2019-11-06 NOTE — PSYCH
Psychotherapy Provided: Individual Psychotherapy 45 minutes     Length of time in session: 45 minutes, follow up in 1 month    Goals addressed in session: Goal 1     Pain:      moderate to severe-- chronic pain from rheumatological issues-- 7/10    Current suicide risk : Low     DATA: Met with Cruz for scheduled individual session  "It turns out, after the wedding was over, that I was actually invited to the shower " Cruz talked about a recent Facebook message that notified her of an unread message  She states she opened it and saw the invitation to her friend's shower  She states she was very angry and upset, because she had assumed that she was not invited to the shower, and her feelings had been very hurt  Onisana Contreras states that she was not able to attend the wedding reception for her friend  She states she had a migraine prior to the wedding, but this was exacerbated by various perfumes, as well as incense that was used during the ceremony  Cruz discussed her new migraine medication--Aimovig  She reports she just started these injections, and she is hopeful for some relief from the frequency of her migraines  Onisana Diane has been following her plan to get out of her home and increase her level of socialization  Onisana Contreras states that her pain level has been high  She reports that the weather has increased her level of pain  Cruz talked about her experiences working at the [employer] yesterday for the election  Cruz reports that she does not believe that her anxiety level has exceeded a 4/10 within the past two weeks  Cruz discussed her symptoms and her associated thought processes  She is able to acknowledge that her thoughts increase her feelings of anxiety  We discussed mindfulness and making choices that, while anxiety producing, might lead to a lesser degree of suffering   She is able to identify things that bring her ciara--e g , going to the shelter, doing crafts, etc  Theodore Contreras agrees to go to the shelter at least one time within the next two weeks, to volunteer her time and to purchase goods from their [address]  ASSESSMENT: Patrick Garcias presents with a somewhat dysthymic mood  Her affect is normal and mood-congruent  Patrick Garcias exhibits a positive therapeutic rapport with this clinician  Cruz appears to have normal insight and judgment  Patrick Garcias continues to exhibit willingness to work on treatment goals and objectives  PLAN: Patrick Garcias will return in 1 month for the next scheduled session; however, she will remain on the cancellation wait list for a sooner appointment  Between sessions, Patrick Garcias will continue to increase her socialization (outside of her home), as well as continue to manage her anxiety with mindfulness-based strategies  She will report back during the next session re: successes and barriers  At the next session, this clinician will use client-centered therapy, mindfulness-based strategies, CBT techniques, Motivational Interviewing and solution-focused therapy to address Cruz's anxiety and pain-related mood dysregulation, in an effort to assist Cruz with meeting treatment goals  Behavioral Health Treatment Plan [facility]: Diagnosis and Treatment Plan explained to Kami Villafuerte relates understanding diagnosis and is agreeable to Treatment Plan   Yes

## 2019-11-20 DIAGNOSIS — G43.709 CHRONIC MIGRAINE WITHOUT AURA WITHOUT STATUS MIGRAINOSUS, NOT INTRACTABLE: ICD-10-CM

## 2019-11-21 RX ORDER — SUMATRIPTAN 100 MG/1
100 TABLET, FILM COATED ORAL ONCE AS NEEDED
Qty: 9 TABLET | Refills: 0 | Status: SHIPPED | OUTPATIENT
Start: 2019-11-21 | End: 2020-01-06 | Stop reason: SDUPTHER

## 2019-11-26 ENCOUNTER — OFFICE VISIT (OUTPATIENT)
Dept: PSYCHIATRY | Facility: CLINIC | Age: 29
End: 2019-11-26
Payer: COMMERCIAL

## 2019-11-26 DIAGNOSIS — M79.7 FIBROMYALGIA: ICD-10-CM

## 2019-11-26 DIAGNOSIS — F41.1 GENERALIZED ANXIETY DISORDER: ICD-10-CM

## 2019-11-26 PROCEDURE — 90792 PSYCH DIAG EVAL W/MED SRVCS: CPT | Performed by: PSYCHIATRY & NEUROLOGY

## 2019-11-26 RX ORDER — ARIPIPRAZOLE 2 MG/1
2 TABLET ORAL DAILY
Qty: 30 TABLET | Refills: 2 | Status: SHIPPED | OUTPATIENT
Start: 2019-11-26 | End: 2020-04-15 | Stop reason: SDUPTHER

## 2019-11-26 RX ORDER — BUPROPION HYDROCHLORIDE 150 MG/1
150 TABLET ORAL DAILY
Qty: 30 TABLET | Refills: 2 | Status: SHIPPED | OUTPATIENT
Start: 2019-11-26 | End: 2020-04-15 | Stop reason: SDUPTHER

## 2019-11-26 RX ORDER — ESCITALOPRAM OXALATE 10 MG/1
10 TABLET ORAL DAILY
Qty: 30 TABLET | Refills: 2 | Status: SHIPPED | OUTPATIENT
Start: 2019-11-26 | End: 2020-02-20 | Stop reason: SDUPTHER

## 2019-11-26 NOTE — PSYCH
Reason for visit: No chief complaint on file  HPI     Rubi Richardson is a 30 y o  female with a history of Anxiety and Depression who presents for psychiatric evaluation due to ongoing  Anxiety si   Primary complaints include: anxiety and feeling depressed  Onset of symptoms was gradual starting several months ago with unchanged course since that time  Psychosocial Stressors: health  Patient stated she used to work at an [employer] but has not worked since December last year  She stated she started to pursue SSi  Sghe suffers chroni cpain and fibromyalgia  She recently started medical MJ tx as well  Patient lives with her mother and she is single and has no children   Some college, but dropped out   Last worked about 1 year ago     Mother has Bipolar 2 disorder and she has RA   Medical MJ: She has an edible as needed for migraine of Fibromyalgia flare up, topical also as needed, and she can use a vaporizer as needed as well            Review Of Systems:     Mood Anxiety and Depression   Behavior Normal    Thought Content Disturbing Thoughts, Feelings and Unreasonalbe or Irrational Fears   General Emotional Problems and Decreased Functioning   Personality Normal   Other Psych Symptoms Normal   Constitutional Negative   ENT Negative   Cardiovascular Negative   Respiratory Negative   Gastrointestinal Negative   Genitourinary Negative   Musculoskeletal Negative   Integumentary Negative   Neurological Negative   Endocrine Normal    Other Symptoms Normal        Past Psychiatric History:      Past Inpatient Psychiatric Treatment:   None   Past Outpatient Psychiatric Treatment:    individual therapy for many years, had to switch counselors about a year ago  Past Suicide Attempts:    no  Past Violent Behavior:    no  Past Psychiatric Medication Trials:    Zoloft, Celexa, Pristiq, Wellbutrin XL, Abilify, Klonopin and Xanax    Family Psychiatric History:   Family History   Problem Relation Age of Onset    Rheum arthritis Mother     Psoriasis Mother     Other Mother     Hypertension Mother     Diabetes unspecified Mother     Sjogren's syndrome Mother     Alcohol abuse Mother     Drug abuse Mother     Anxiety disorder Father     Alcohol abuse Father     Cancer Other     Diabetes Other     Other Other         High blood pressure    Depression Maternal Grandmother        Social History:    Education: some college  Learning Disabilities: denies  Marital history: single  Living arrangement, social support: The patient lives in home with mother  Occupational History: unemployed  Functioning Relationships: good support system    Other Pertinent History: Financial     Social History     Substance and Sexual Activity   Drug Use Yes    Types: Marijuana    Comment: Medical marijuana       Traumatic History:       Abuse: denies  Other Traumatic Events: n/a    The following portions of the patient's history were reviewed and updated as appropriate: allergies, current medications, past family history, past medical history, past social history, past surgical history and problem list      Social History     Socioeconomic History    Marital status: Single     Spouse name: Not on file    Number of children: 0    Years of education: 15    Highest education level: Not on file   Occupational History    Occupation: Unemployed   Social Needs    Financial resource strain: Very hard    Food insecurity:     Worry: Often true     Inability: Never true    Transportation needs:     Medical: No     Non-medical: No   Tobacco Use    Smoking status: Never Smoker    Smokeless tobacco: Never Used   Substance and Sexual Activity    Alcohol use: No    Drug use: Yes     Types: Marijuana     Comment: Medical marijuana    Sexual activity: Yes     Partners: Male     Comment: Nuva ring   Lifestyle    Physical activity:     Days per week: 0 days     Minutes per session: Not on file    Stress: Not on file   Relationships    Social connections:     Talks on phone: Not on file     Gets together: Not on file     Attends Congregation service: Not on file     Active member of club or organization: Not on file     Attends meetings of clubs or organizations: Not on file     Relationship status: Not on file    Intimate partner violence:     Fear of current or ex partner: Not on file     Emotionally abused: Not on file     Physically abused: Not on file     Forced sexual activity: Not on file   Other Topics Concern    Not on file   Social History Narrative    Home:  Living with mom and MGM        Education:    Pt denies any h/o learning disability Dxs but admits to having great difficulty in math requiring a   She reached childhood milestones on time as far as he knows  Graduated HS 2009    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 college art classes--she stopped due to anxiety from dissatisfaction with her classes--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 felt they were telling her what to create  On reflection, she feels it was moreso her anxiety as the cause of her leaving school, and she was using the other reason as an excuse so she would not have to admit to anxiety at that time  She is now very open about her anxiety and does not mind that I have this information in the general social section of her chart  Social History     Social History Narrative    Home:  Living with mom and MGM        Education:    Pt denies any h/o learning disability Dxs but admits to having great difficulty in math requiring a   She reached childhood milestones on time as far as he knows  Graduated HS 2009    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 college art classes--she stopped due to anxiety from dissatisfaction with her classes--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 felt they were telling her what to create   On reflection, she feels it was moreso her anxiety as the cause of her leaving school, and she was using the other reason as an excuse so she would not have to admit to anxiety at that time  She is now very open about her anxiety and does not mind that I have this information in the general social section of her chart  Mental status:  Appearance calm and cooperative , adequate hygiene and grooming and good eye contact    Mood dysphoric and anxious   Affect affect was constricted   Speech a normal rate and fluent   Thought Processes coherent/organized and normal thought processes   Hallucinations no hallucinations present    Thought Content no delusions   Abnormal Thoughts no suicidal thoughts  and no homicidal thoughts    Orientation  oriented to person and place and time   Remote Memory short term memory intact and long term memory intact   Attention Span concentration intact   Intellect Appears to be of Average Intelligence   Insight Limited insight   Judgement judgment was limited   Muscle Strength Muscle strength and tone were normal and Normal gait    Language no difficulty naming common objects, no difficulty repeating a phrase  and no difficulty writing a sentence    Fund of Knowledge displays adequate knowledge of current events, adequate fund of knowledge regarding past history and adequate fund of knowledge regarding vocabulary    Pain moderate to severe   Pain Scale 3         Laboratory Results: No results found for this or any previous visit  Assessment/Plan:      Diagnoses and all orders for this visit:    Fibromyalgia  -     ARIPiprazole (ABILIFY) 2 mg tablet; Take 1 tablet (2 mg total) by mouth daily    Generalized anxiety disorder  -     buPROPion (WELLBUTRIN XL) 150 mg 24 hr tablet; Take 1 tablet (150 mg total) by mouth daily  -     escitalopram (LEXAPRO) 10 mg tablet;  Take 1 tablet (10 mg total) by mouth daily          Treatment Recommendations- Risks Benefits         Immediate Medical/Psychiatric/Psychotherapy Treatments and Any Precautions: continue current medications and star Lexapro 10 mg qam     Risks, Benefits And Possible Side Effects Of Medications:  Risks, benefits, and possible side effects of medications explained to patient and patient verbalizes understanding    Controlled Medication Discussion: Discussed with patient Black Box warning on concurrent use of benzodiazepines and opioid medications including sedation, respiratory depression, coma and death  Patient understands the risk of treatment with benzodiazepines in addition to opioids and wants to continue taking those medications  , Discussed with patient the risks of sedation, respiratory depression, impairment of ability to drive and potential for abuse and addiction related to treatment with benzodiazepine medications  The patient understands risk of treatment with benzodiazepine medications, agrees to not drive if feels impaired and agrees to take medications as prescribed   and The patient has been filling controlled prescriptions on time as prescribed to [facility]  program

## 2019-12-02 ENCOUNTER — OFFICE VISIT (OUTPATIENT)
Dept: FAMILY MEDICINE CLINIC | Facility: CLINIC | Age: 29
End: 2019-12-02
Payer: COMMERCIAL

## 2019-12-02 VITALS
SYSTOLIC BLOOD PRESSURE: 122 MMHG | HEART RATE: 87 BPM | HEIGHT: 63 IN | TEMPERATURE: 99.8 F | DIASTOLIC BLOOD PRESSURE: 90 MMHG | BODY MASS INDEX: 31.36 KG/M2 | WEIGHT: 177 LBS | OXYGEN SATURATION: 98 %

## 2019-12-02 DIAGNOSIS — Z23 ENCOUNTER FOR IMMUNIZATION: ICD-10-CM

## 2019-12-02 DIAGNOSIS — M79.7 FIBROMYALGIA: ICD-10-CM

## 2019-12-02 DIAGNOSIS — Z00.00 WELL ADULT EXAM: Primary | ICD-10-CM

## 2019-12-02 PROCEDURE — 99395 PREV VISIT EST AGE 18-39: CPT | Performed by: FAMILY MEDICINE

## 2019-12-02 PROCEDURE — 90471 IMMUNIZATION ADMIN: CPT | Performed by: FAMILY MEDICINE

## 2019-12-02 PROCEDURE — 90682 RIV4 VACC RECOMBINANT DNA IM: CPT | Performed by: FAMILY MEDICINE

## 2019-12-02 RX ORDER — PREGABALIN 25 MG/1
25 CAPSULE ORAL 2 TIMES DAILY
Qty: 60 CAPSULE | Refills: 3 | Status: SHIPPED | OUTPATIENT
Start: 2019-12-02 | End: 2020-06-24 | Stop reason: SDUPTHER

## 2019-12-02 NOTE — PROGRESS NOTES
Assessment/Plan:  Flu shot given at this time  Patient up-to-date with gynecologic care  Patient up-to-date with colonoscopy  Patient already had Adacel shot  Diagnoses and all orders for this visit:    Encounter for immunization  -     influenza vaccine, [id number], quadrivalent, recombinant, PF, 0 5 mL, for patients 18 yr+ (FLUBLOK)    Fibromyalgia  -     pregabalin (LYRICA) 25 mg capsule; Take 1 capsule (25 mg total) by mouth 2 (two) times a day            Subjective:        Patient ID: Rock Villarreal is a 30 y o  female  Patient is here for wellness exam   Patient would like to have flu shot  Patient up-to-date with Adacel shot  Patient status post seeing gynecologist   Patient already had colonoscopy which was normal 1 year ago  Patient is seeing Psychiatry and depression stable  Migraines have improved with decreased frequency  Patient feeling okay overall but this has some pain intermittently        The following portions of the patient's history were reviewed and updated as appropriate: allergies, current medications, past family history, past medical history, past social history, past surgical history and problem list       Review of Systems   Constitutional: Negative  HENT: Negative  Eyes: Negative  Respiratory: Negative  Cardiovascular: Negative  Gastrointestinal: Negative  Endocrine: Negative  Genitourinary: Negative  Musculoskeletal: Positive for arthralgias  Skin: Negative  Allergic/Immunologic: Negative  Neurological: Negative  Hematological: Negative  Psychiatric/Behavioral: Negative  Objective:               /90 (BP Location: Right arm, Patient Position: Sitting, Cuff Size: Standard)   Pulse 87   Temp 99 8 °F (37 7 °C) (Tympanic)   Ht 5' 3" (1 6 m)   Wt 80 3 kg (177 lb)   SpO2 98%   BMI 31 35 kg/m²          Physical Exam   Constitutional: She appears well-developed and well-nourished  No distress     HENT:   Head: Normocephalic  Right Ear: External ear normal    Left Ear: External ear normal    Mouth/Throat: Oropharynx is clear and moist  No oropharyngeal exudate  Eyes: Pupils are equal, round, and reactive to light  EOM are normal  Right eye exhibits no discharge  Left eye exhibits no discharge  No scleral icterus  Neck: Normal range of motion  Neck supple  No thyromegaly present  Cardiovascular: Normal rate, regular rhythm, normal heart sounds and intact distal pulses  Exam reveals no gallop and no friction rub  No murmur heard  Pulmonary/Chest: Effort normal and breath sounds normal  No respiratory distress  She has no wheezes  She has no rales  She exhibits no tenderness  Abdominal: Soft  Bowel sounds are normal  She exhibits no distension  There is no tenderness  There is no rebound and no guarding  Musculoskeletal: Normal range of motion  She exhibits no edema or tenderness  Lymphadenopathy:     She has no cervical adenopathy  Neurological: She is alert  No cranial nerve deficit  She exhibits normal muscle tone  Coordination normal    Skin: Skin is warm and dry  No rash noted  She is not diaphoretic  No erythema  No pallor  Psychiatric: She has a normal mood and affect  Her behavior is normal  Judgment and thought content normal    Nursing note and vitals reviewed

## 2019-12-06 ENCOUNTER — SOCIAL WORK (OUTPATIENT)
Dept: BEHAVIORAL/MENTAL HEALTH CLINIC | Facility: CLINIC | Age: 29
End: 2019-12-06
Payer: COMMERCIAL

## 2019-12-06 DIAGNOSIS — F33.1 DEPRESSION, MAJOR, RECURRENT, MODERATE (HCC): Primary | ICD-10-CM

## 2019-12-06 DIAGNOSIS — F41.1 GENERALIZED ANXIETY DISORDER: ICD-10-CM

## 2019-12-06 PROCEDURE — 90834 PSYTX W PT 45 MINUTES: CPT | Performed by: SOCIAL WORKER
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
## 2019-12-13 ENCOUNTER — TELEPHONE (OUTPATIENT)
Dept: NEUROLOGY | Facility: CLINIC | Age: 29
End: 2019-12-13

## 2019-12-13 NOTE — TELEPHONE ENCOUNTER
Patient is scheduled with Jaimee Blackburn on 1/6/2020 in the [facility] location

## 2019-12-13 NOTE — TELEPHONE ENCOUNTER
Type Date User Summary Attachment   General 12/13/2019 11:19 AM Titus Teran care coordination  -   Note    Botox- authorization #: [id number]- 2nd visit- valid from 8/5/2019 until 8/5/2020   [facility]      Thank you,     Saleem Chung

## 2019-12-16 NOTE — TELEPHONE ENCOUNTER
[address]- spoke with Sahil Del Castillo- I made her aware I was calling to initiate a refill on the patient's Botox prescription  She states this order is ready to be set up for delivery however, the patient's consent is not on file  Will need to contact the patient to call [facility] to give consent to ship so I can set up delivery for her Botox order  Called and spoke with the patient- I made her aware that her Botox order is all ready to be set up for delivery but they need to obtain her consent to ship before I can set up delivery  I confirmed with the patient that her insurance would not be changing for the new year  Patient confirmed and will call [facility] today to give consent to ship  I provided her with the phone number to set up delivery  Will call later today to complete the order and set up delivery

## 2019-12-17 NOTE — TELEPHONE ENCOUNTER
[address]- spoke with Mateusz Guzman- I advised her I am calling to check and see if the patient called in and gave consent to ship  She confirmed patient did give consent to ship  Botox to be delivered on Wednesday 12/18/2019 to the [facility] location- a signature will be required  Sonia/ Juwan,    Please await Botox delivery and document once it has arrived  Please let me know if you do not receive the patient's Botox order      Thank you,    Elva Cortez

## 2019-12-17 NOTE — TELEPHONE ENCOUNTER
I will not be in the [facility] office tomorrow,    Juwan/Alida please be on the lookout for the patient's Botox and let Tejal Madrid know if it does not arrive  Thank you!

## 2019-12-18 ENCOUNTER — DOCUMENTATION (OUTPATIENT)
Dept: NEUROLOGY | Facility: CLINIC | Age: 29
End: 2019-12-18

## 2019-12-18 NOTE — TELEPHONE ENCOUNTER
Botox received at [facility] location 12/18/19 at 1: 10 pm   Logged into book and placed into fridge

## 2019-12-18 NOTE — PROGRESS NOTES
Botox received at [facility] location 12/18/19 at 1: 10 pm   Logged into book and placed into fridge

## 2019-12-23 ENCOUNTER — SOCIAL WORK (OUTPATIENT)
Dept: BEHAVIORAL/MENTAL HEALTH CLINIC | Facility: CLINIC | Age: 29
End: 2019-12-23
Payer: COMMERCIAL

## 2019-12-23 DIAGNOSIS — F41.1 GENERALIZED ANXIETY DISORDER: ICD-10-CM

## 2019-12-23 DIAGNOSIS — F33.1 DEPRESSION, MAJOR, RECURRENT, MODERATE (HCC): Primary | ICD-10-CM

## 2019-12-23 PROCEDURE — 90834 PSYTX W PT 45 MINUTES: CPT | Performed by: SOCIAL WORKER
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
## 2020-01-06 ENCOUNTER — PROCEDURE VISIT (OUTPATIENT)
Dept: NEUROLOGY | Facility: CLINIC | Age: 30
End: 2020-01-06
Payer: COMMERCIAL

## 2020-01-06 VITALS — SYSTOLIC BLOOD PRESSURE: 127 MMHG | TEMPERATURE: 98.8 F | DIASTOLIC BLOOD PRESSURE: 71 MMHG | HEART RATE: 95 BPM

## 2020-01-06 DIAGNOSIS — G43.709 CHRONIC MIGRAINE WITHOUT AURA WITHOUT STATUS MIGRAINOSUS, NOT INTRACTABLE: ICD-10-CM

## 2020-01-06 PROCEDURE — 64615 CHEMODENERV MUSC MIGRAINE: CPT | Performed by: PHYSICIAN ASSISTANT

## 2020-01-06 RX ORDER — SUMATRIPTAN 100 MG/1
100 TABLET, FILM COATED ORAL ONCE AS NEEDED
Qty: 9 TABLET | Refills: 2 | Status: SHIPPED | OUTPATIENT
Start: 2020-01-06 | End: 2020-04-08 | Stop reason: SDUPTHER

## 2020-01-06 NOTE — PROGRESS NOTES
Chemodenervation  Date/Time: 1/6/2020 8:10 AM  Performed by: Leone Closs, PA-C  Authorized by: Leone Closs, PA-C     Pre-procedure details:     Prepped With: Alcohol    Procedure details:     Position:  Upright  Botox:     Botox Type:  Type A    Brand:  Botox    mL's of Botulinum Toxin:  155    Final Concentration per CC:  50 units    Needle Gauge:  30 G 2 5 inch  Procedures:     Botox Procedures: chronic headache      Indications: migraines    Injection Location:     Head / Face:  L , R , L frontalis, R frontalis, R inferior cervical paraspinal, L inferior cervical paraspinal, L medial occipitalis, R medial occipitalis, L lateral occipitalis, R lateral occipitalis, procerus, L temporalis, R temporalis, R superior trapezius and L superior trapezius    L  injection amount:  5 unit(s)    R  injection amount:  5 unit(s)    L lateral frontalis:  5 unit(s)    R lateral frontalis:  5 unit(s)    L medial frontalis:  5 unit(s)    R medial frontalis:  5 unit(s)    L temporalis injection amount:  20 unit(s)    R temporalis injection amount:  20 unit(s)    Procerus injection amount:  5 unit(s)    L lateral occipitalis injection amount:  5 unit(s)    R lateral occipitalis injection amount:  5 unit(s)    L medial occipitalis injection amount:  10 unit(s)    R medial occipitalis injection amount:  10 unit(s)    L inferior cervical paraspinal injection amount:  10 unit(s)    R inferior cervical paraspinal injection amount:  10 unit(s)    L superior trapezius injection amount:  0 unit(s)    R superior trapezius injection amount:  0 unit(s)  Total Units:     Total units used:  155    Total units discarded:  45  Post-procedure details:     Chemodenervation:  Chronic migraine    Facial Nerve Location[de-identified]  Bilateral facial nerve    Patient tolerance of procedure:   Tolerated well, no immediate complications  Comments:      30 extra units in the temporoparietal regions, b/l, medically necessary  Avoid traps b/l        Vitals:    01/06/20 0803   BP: 127/71   Pulse: 95   Temp: 98 8 °F (37 1 °C)

## 2020-01-07 ENCOUNTER — TELEPHONE (OUTPATIENT)
Dept: BEHAVIORAL/MENTAL HEALTH CLINIC | Facility: CLINIC | Age: 30
End: 2020-01-07

## 2020-01-08 ENCOUNTER — SOCIAL WORK (OUTPATIENT)
Dept: BEHAVIORAL/MENTAL HEALTH CLINIC | Facility: CLINIC | Age: 30
End: 2020-01-08
Payer: COMMERCIAL

## 2020-01-08 DIAGNOSIS — F33.1 DEPRESSION, MAJOR, RECURRENT, MODERATE (HCC): Primary | Chronic | ICD-10-CM

## 2020-01-08 DIAGNOSIS — F41.1 GENERALIZED ANXIETY DISORDER: Chronic | ICD-10-CM

## 2020-01-08 PROCEDURE — 90834 PSYTX W PT 45 MINUTES: CPT | Performed by: SOCIAL WORKER

## 2020-01-08 NOTE — PSYCH
Psychotherapy Provided: Individual Psychotherapy 45 minutes     Length of time in session: 45 minutes, follow up in 2 weeks    Goals addressed in session: Goal 1     Pain:      moderate to severe-- physical pain related to fibromyalgia    Current suicide risk : Low     DATA: Met with Cruz for scheduled individual session  "It's been crazy " Cruz talked about her long-distance relationship with the man she corresponded with in [place]  He was the person who initiated the relationship, but he then stated that he couldn't handle the distance  "We got back together, then he blocked me " Cruz's friend encouraged her to join a dating site  She went on a date with someone she met through [facility]  She states she had a nice time on the date  We discussed her ability to set and maintain appropriate boundaries and limits when beginning a new relationship  She states that she believes that, at this point, she is able to do this  She has made a decision to return to her home after a date, rather than spending the night  We discussed sexual intimacy and responsibility for one's own sexual health  She endorses the practice of safer sex (for both oral and vaginal sex)  We discussed the emotional implications of physical intimacy  Cruz also discussed the importance of spending time with her female best friend  She states she feels that she is able to be herself and have fun with her friend, without judgement  This is a place where she is able to get support  We spent some time discussing Cruz's medical health issues  Miles Cr states that she has noticed a 50 percent decrease in the number of migraines she has been experiencing, as well as a decrease in the duration and intensity of the migraines  She states she is hoping that she can gain control of her migraines to the point where she can return to some sort of work  She reports that she will be starting a yoga class next week   We discussed the importance of gaining insight into the mind-body connection and increasing her use of mindfulness-based strategies  ASSESSMENT: Nicole Mancera presents with a somewhat dysthymic mood  Her affect is mood-congruent  Nicole Mancera exhibits a positive therapeutic rapport with this clinician  Cruz appears to have normal insight and judgment  Nicole Mancera continues to exhibit willingness to work on treatment goals and objectives  PLAN: Nicole Mancera will return in two weeks for the next scheduled session  Between sessions, Nicole Mancera will continue to increase her use of mindfulness-based strategies and will report back during the next session re: successes and barriers  At the next session, this clinician will use client-centered therapy, mindfulness-based strategies, DBT-informed skills and solution-focused therapy to address her anxiety and depression, in an effort to assist Cruz with meeting treatment goals  Behavioral Health Treatment Plan [facility]: Diagnosis and Treatment Plan explained to Andressa Gloria relates understanding diagnosis and is agreeable to Treatment Plan   Yes

## 2020-01-08 NOTE — TELEPHONE ENCOUNTER
T/C to confirm appointment scheduled for 1/8/2020 2 10:30am  Cruz did not auto-confirm this appointment; therefore, I made a direct confirmation call  Spoke with client  Confirmed they will be in attendance at the scheduled time

## 2020-01-12 ENCOUNTER — DOCUMENTATION (OUTPATIENT)
Dept: PSYCHIATRY | Facility: CLINIC | Age: 30
End: 2020-01-12

## 2020-01-12 NOTE — PROGRESS NOTES
Luis Ball Cx appointment 1/9/2020 (Frank Miller canceled her apt 1/9/2020 at 3:00pm I rescheduled her with you on 3/4/2020 at 11:20am )

## 2020-01-21 ENCOUNTER — TELEPHONE (OUTPATIENT)
Dept: BEHAVIORAL/MENTAL HEALTH CLINIC | Facility: CLINIC | Age: 30
End: 2020-01-21

## 2020-01-22 ENCOUNTER — SOCIAL WORK (OUTPATIENT)
Dept: BEHAVIORAL/MENTAL HEALTH CLINIC | Facility: CLINIC | Age: 30
End: 2020-01-22
Payer: COMMERCIAL

## 2020-01-22 DIAGNOSIS — F41.1 GENERALIZED ANXIETY DISORDER: Chronic | ICD-10-CM

## 2020-01-22 DIAGNOSIS — F33.1 DEPRESSION, MAJOR, RECURRENT, MODERATE (HCC): Primary | Chronic | ICD-10-CM

## 2020-01-22 PROCEDURE — 90834 PSYTX W PT 45 MINUTES: CPT | Performed by: SOCIAL WORKER

## 2020-01-22 NOTE — PSYCH
Psychotherapy Provided: Individual Psychotherapy 45 minutes     Length of time in session: 45 minutes, follow up in 2 weeks    Goals addressed in session: Goal 1     Pain:      moderate to severe-- physical pain    Current suicide risk : Low     DATA: Met with Cruz for scheduled individual session  "I did yoga " Monique Nesbitt states that she participated in a 30-minute yoga class via the [employer]  The teacher was her previous therapist, so she felt much more comfortable  She reports that the yoga was very gentle and more focused on breath work  Cruz states she plans to do some individual sessions and then plans to increase her comfort-level until she is able to do a class with other people  We discussed her physical health issues  "I've been in a flare " We discussed the preventative measures that Cruz is using for her medical issues-- including medications and lifestyle changes (e g , yoga)  Monique Nesbitt states that her mother has just started working from home  She states that they are "like best friends," and she thinks it will work out pretty well  We talked about some of Cruz's hobbies and coping skills  She discussed her art work as one of her primary coping skills   "I'm going on another date on Friday " We discussed her goals related to this relationship  She states that she hopes that he will be, at the very least, a good friend  She states, "I think he will help me improve myself  He is really into yoga " We spent some additional time talking about her boundaries within this relationship  We reviewed safety (both emotional and physical--including sexual safety)  Monique Nesbitt states that she has decided that she will not yet spend the night at his home  She states that she is trying to maintain some healthy boundaries regarding time and connection   She continues to spend time with her best friend and does not want to have a new relationship significantly impact her time with her mother or her friend  ASSESSMENT: Winnie Smith presents with a primarily euthymic mood  Her affect is normal and mood-congruent  Winnie Smith exhibits a positive therapeutic rapport with this clinician  Cruz appears to have normal insight and judgment  Winnie Smith continues to exhibit willingness to work on treatment goals and objectives  PLAN: Winnie Smith will return in two weeks for the next scheduled session  Between sessions, Winnie Smith will continue to increase her use of mindfulness-based strategies  She will also maintain her boundaries in her new relationship and will report back during the next session re: successes and barriers  At the next session, this clinician will use client-centered therapy, mindfulness-based strategies, DBT-informed skills and solution-focused therapy to address her anxiety and mood regulation, in an effort to assist Cruz with meeting treatment goals  Behavioral Health Treatment Plan [facility]: Diagnosis and Treatment Plan explained to Nay Fuller relates understanding diagnosis and is agreeable to Treatment Plan   Yes

## 2020-01-22 NOTE — TELEPHONE ENCOUNTER
T/C to confirm appointment scheduled for 01/22/2020 @ 10:30am  Cruz did not auto-confirm this appointment; therefore, I made a direct confirmation call  Spoke with client  Confirmed they will be in attendance at the scheduled time

## 2020-01-23 ENCOUNTER — OFFICE VISIT (OUTPATIENT)
Dept: BEHAVIORAL/MENTAL HEALTH CLINIC | Facility: CLINIC | Age: 30
End: 2020-01-23
Payer: COMMERCIAL

## 2020-01-23 DIAGNOSIS — F33.1 DEPRESSION, MAJOR, RECURRENT, MODERATE (HCC): Primary | Chronic | ICD-10-CM

## 2020-01-23 DIAGNOSIS — F41.1 GENERALIZED ANXIETY DISORDER: Chronic | ICD-10-CM

## 2020-01-23 PROCEDURE — 90853 GROUP PSYCHOTHERAPY: CPT | Performed by: SOCIAL WORKER
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
## 2020-01-28 ENCOUNTER — HOSPITAL ENCOUNTER (EMERGENCY)
Facility: HOSPITAL | Age: 30
Discharge: HOME | End: 2020-01-28
Attending: FAMILY MEDICINE
Payer: COMMERCIAL

## 2020-01-28 ENCOUNTER — APPOINTMENT (EMERGENCY)
Dept: RADIOLOGY | Facility: HOSPITAL | Age: 30
End: 2020-01-28
Payer: COMMERCIAL

## 2020-01-28 VITALS
HEART RATE: 94 BPM | BODY MASS INDEX: 30.12 KG/M2 | HEIGHT: 63 IN | OXYGEN SATURATION: 98 % | SYSTOLIC BLOOD PRESSURE: 161 MMHG | TEMPERATURE: 98.3 F | WEIGHT: 170 LBS | DIASTOLIC BLOOD PRESSURE: 100 MMHG | RESPIRATION RATE: 18 BRPM

## 2020-01-28 DIAGNOSIS — M94.0 COSTOCHONDRITIS: Primary | ICD-10-CM

## 2020-01-28 LAB
ANION GAP SERPL CALCULATED.3IONS-SCNC: 11 MMOL/L (ref 4–13)
BASOPHILS # BLD AUTO: 0.1 THOUSANDS/ΜL (ref 0–0.1)
BASOPHILS NFR BLD AUTO: 1 % (ref 0–2)
BUN SERPL-MCNC: 8 MG/DL (ref 7–25)
CALCIUM SERPL-MCNC: 9.2 MG/DL (ref 8.6–10.5)
CHLORIDE SERPL-SCNC: 101 MMOL/L (ref 98–107)
CO2 SERPL-SCNC: 22 MMOL/L (ref 21–31)
CREAT SERPL-MCNC: 0.63 MG/DL (ref 0.6–1.2)
D DIMER PPP FEU-MCNC: <0.15 UG/ML FEU
EOSINOPHIL # BLD AUTO: 0.3 THOUSAND/ΜL (ref 0–0.61)
EOSINOPHIL NFR BLD AUTO: 3 % (ref 0–5)
ERYTHROCYTE [DISTWIDTH] IN BLOOD BY AUTOMATED COUNT: 12.8 % (ref 11.5–14.5)
EXT PREG TEST URINE: NEGATIVE
EXT. CONTROL ED NAV: NORMAL
GFR SERPL CREATININE-BSD FRML MDRD: 122 ML/MIN/1.73SQ M
GLUCOSE SERPL-MCNC: 100 MG/DL (ref 65–99)
HCT VFR BLD AUTO: 40.7 % (ref 42–47)
HGB BLD-MCNC: 13.7 G/DL (ref 12–16)
LYMPHOCYTES # BLD AUTO: 4.5 THOUSANDS/ΜL (ref 0.6–4.47)
LYMPHOCYTES NFR BLD AUTO: 44 % (ref 21–51)
MCH RBC QN AUTO: 29.3 PG (ref 26–34)
MCHC RBC AUTO-ENTMCNC: 33.7 G/DL (ref 31–37)
MCV RBC AUTO: 87 FL (ref 81–99)
MONOCYTES # BLD AUTO: 0.7 THOUSAND/ΜL (ref 0.17–1.22)
MONOCYTES NFR BLD AUTO: 7 % (ref 2–12)
NEUTROPHILS # BLD AUTO: 4.7 THOUSANDS/ΜL (ref 1.4–6.5)
NEUTS SEG NFR BLD AUTO: 46 % (ref 42–75)
PLATELET # BLD AUTO: 506 THOUSANDS/UL (ref 149–390)
PMV BLD AUTO: 7 FL (ref 8.6–11.7)
POTASSIUM SERPL-SCNC: 3.8 MMOL/L (ref 3.5–5.5)
RBC # BLD AUTO: 4.67 MILLION/UL (ref 3.9–5.2)
SODIUM SERPL-SCNC: 134 MMOL/L (ref 134–143)
TROPONIN I SERPL-MCNC: <0.03 NG/ML
WBC # BLD AUTO: 10.2 THOUSAND/UL (ref 4.8–10.8)

## 2020-01-28 PROCEDURE — 36415 COLL VENOUS BLD VENIPUNCTURE: CPT | Performed by: PHYSICIAN ASSISTANT

## 2020-01-28 PROCEDURE — 71046 X-RAY EXAM CHEST 2 VIEWS: CPT

## 2020-01-28 PROCEDURE — 99284 EMERGENCY DEPT VISIT MOD MDM: CPT

## 2020-01-28 PROCEDURE — 99284 EMERGENCY DEPT VISIT MOD MDM: CPT | Performed by: PHYSICIAN ASSISTANT

## 2020-01-28 PROCEDURE — 85379 FIBRIN DEGRADATION QUANT: CPT | Performed by: PHYSICIAN ASSISTANT

## 2020-01-28 PROCEDURE — 81025 URINE PREGNANCY TEST: CPT | Performed by: PHYSICIAN ASSISTANT

## 2020-01-28 PROCEDURE — 85025 COMPLETE CBC W/AUTO DIFF WBC: CPT | Performed by: PHYSICIAN ASSISTANT

## 2020-01-28 PROCEDURE — 80048 BASIC METABOLIC PNL TOTAL CA: CPT | Performed by: PHYSICIAN ASSISTANT

## 2020-01-28 PROCEDURE — 84484 ASSAY OF TROPONIN QUANT: CPT | Performed by: PHYSICIAN ASSISTANT

## 2020-01-28 NOTE — ED PROVIDER NOTES
History  Chief Complaint   Patient presents with    Rib Pain     According to the patient, she has a hx of rib pain and the patient reports that this pain had started yesterday  Patient presents to the emergency department today for evaluation of bilateral rib pain  She states it has been present since she woke up yesterday morning  Hurts when she takes in a deep breath  Denies leg pain leg edema  Denies back pain  Denies any cough  No fevers chills sweats  She does have new ringing plan at therefore irregular menstrual cycle  Denies smoking history  Prior to Admission Medications   Prescriptions Last Dose Informant Patient Reported? Taking? ALPRAZolam (XANAX) 0 25 mg tablet   No No   Sig: Take 1 tablet (0 25 mg total) by mouth 2 (two) times a day as needed for anxiety   ARIPiprazole (ABILIFY) 2 mg tablet   No No   Sig: Take 1 tablet (2 mg total) by mouth daily   B-D 3CC LUER-DEANN SYR 25GX1" 25G X 1" 3 ML MISC  Self Yes No   Cholecalciferol (VITAMIN D3) 36102 units CAPS  Self Yes No   Sig: Take by mouth once a week   Erenumab-aooe 140 MG/ML SOAJ   No No   Sig: Inject 140 mg under the skin every 30 (thirty) days   SUMAtriptan (IMITREX) 100 mg tablet   No No   Sig: Take 1 tablet (100 mg total) by mouth once as needed for migraine for up to 1 dose   Syringe/Needle, Disp, (SYRINGE 3CC/07EG9-5/4") 27G X 1-1/4" 3 ML MISC   No No   Sig: Use for IM injection of ketorolac     buPROPion (WELLBUTRIN XL) 150 mg 24 hr tablet   No No   Sig: Take 1 tablet (150 mg total) by mouth daily   escitalopram (LEXAPRO) 10 mg tablet   No No   Sig: Take 1 tablet (10 mg total) by mouth daily   etonogestrel-ethinyl estradiol (NUVARING) 0 12-0 015 MG/24HR vaginal ring   No No   Sig: Insert ring for 21 days then remove for one week    ketorolac (TORADOL) 60 mg/2 mL   No No   Sig: Inject 1-2 mL (30-60 mg total) into a muscle every 6 (six) hours as needed for moderate pain   melatonin 1 mg   No No   Si-2 tabs qhs prn insomnia   naproxen (NAPROSYN) 500 mg tablet  Self No No   Sig: Take 1 tablet (500 mg total) by mouth as needed for headaches   onabotulinumtoxin A (BOTOX) 100 units  Self Yes No   Sig: Inject as directed   pregabalin (LYRICA) 25 mg capsule   No No   Sig: Take 1 capsule (25 mg total) by mouth 2 (two) times a day   prochlorperazine (COMPAZINE) 10 mg tablet  Self No No   Sig: Take 1 tablet (10 mg total) by mouth every 6 (six) hours as needed for nausea or vomiting   Patient not taking: Reported on 12/2/2019      Facility-Administered Medications: None       Past Medical History:   Diagnosis Date    Abnormal Pap smear of cervix     Anxiety     Arthritis     Depression     Fibromyalgia, primary     Migraine        Past Surgical History:   Procedure Laterality Date    COLONOSCOPY N/A 5/8/2018    Procedure: COLONOSCOPY;  Surgeon: Hiren Foster MD;  Location: [facility]; Service: Gastroenterology    TONSILLECTOMY      WISDOM TOOTH EXTRACTION         Family History   Problem Relation Age of Onset    Rheum arthritis Mother     Psoriasis Mother     Other Mother     Hypertension Mother     Diabetes unspecified Mother     Sjogren's syndrome Mother     Alcohol abuse Mother     Drug abuse Mother     Anxiety disorder Father     Alcohol abuse Father     Cancer Other     Diabetes Other     Other Other         High blood pressure    Depression Maternal Grandmother      I have reviewed and agree with the history as documented  Social History     Tobacco Use    Smoking status: Never Smoker    Smokeless tobacco: Never Used   Substance Use Topics    Alcohol use: No    Drug use: Yes     Types: Marijuana     Comment: Medical marijuana        Review of Systems   Constitutional: Negative  Negative for chills and fever  HENT: Negative  Negative for sore throat and trouble swallowing  Eyes: Negative  Respiratory: Negative  Negative for cough, shortness of breath and wheezing      Cardiovascular: Positive for chest pain  Negative for leg swelling  Gastrointestinal: Negative  Negative for abdominal pain, blood in stool and vomiting  Endocrine: Negative  Genitourinary: Negative  Musculoskeletal: Negative  Negative for neck stiffness  Skin: Negative  Allergic/Immunologic: Negative  Neurological: Negative  Negative for dizziness, seizures, speech difficulty, weakness, light-headedness, numbness and headaches  Hematological: Negative  Psychiatric/Behavioral: Negative  All other systems reviewed and are negative  Physical Exam  Physical Exam   Constitutional: She is oriented to person, place, and time  Vital signs are normal  She appears well-developed and well-nourished  She does not have a sickly appearance  She does not appear ill  No distress  HENT:   Right Ear: External ear normal  No swelling  Tympanic membrane is not bulging  Left Ear: External ear normal  No swelling  Tympanic membrane is not bulging  Nose: Nose normal    Mouth/Throat: Oropharynx is clear and moist  No oropharyngeal exudate  Eyes: Pupils are equal, round, and reactive to light  Conjunctivae, EOM and lids are normal    Neck: Normal range of motion  Neck supple  No JVD present  No tracheal deviation, no edema and normal range of motion present  No thyromegaly present  Cardiovascular: Normal rate, regular rhythm, normal heart sounds, intact distal pulses and normal pulses  Exam reveals no gallop and no friction rub  No murmur heard  Pulmonary/Chest: Effort normal and breath sounds normal  No stridor  No respiratory distress  She has no wheezes  She has no rales  She exhibits no tenderness  Abdominal: Soft  Bowel sounds are normal  She exhibits no distension and no mass  There is no tenderness  There is no rebound, no guarding and negative Segura's sign  No hernia  Musculoskeletal: Normal range of motion  She exhibits no edema or tenderness     Lymphadenopathy:     She has no cervical adenopathy  Neurological: She is alert and oriented to person, place, and time  She has normal strength and normal reflexes  No cranial nerve deficit or sensory deficit  GCS eye subscore is 4  GCS verbal subscore is 5  GCS motor subscore is 6  Skin: Skin is warm and dry  Capillary refill takes less than 2 seconds  No rash noted  She is not diaphoretic  No erythema  No pallor  Psychiatric: She has a normal mood and affect  Her speech is normal and behavior is normal    Vitals reviewed        Vital Signs  ED Triage Vitals [01/28/20 1805]   Temperature Pulse Respirations Blood Pressure SpO2   98 3 °F (36 8 °C) 94 18 161/100 98 %      Temp Source Heart Rate Source Patient Position - Orthostatic VS BP Location FiO2 (%)   Temporal Monitor Sitting Left arm --      Pain Score       7           Vitals:    01/28/20 1805   BP: 161/100   Pulse: 94   Patient Position - Orthostatic VS: Sitting         Visual Acuity      ED Medications  Medications - No data to display    Diagnostic Studies  Results Reviewed     Procedure Component Value Units Date/Time    Basic metabolic panel [[id number]]  (Abnormal) Collected:  01/28/20 1823    Lab Status:  Final result Specimen:  Blood from Arm, Left Updated:  01/28/20 1906     Sodium 134 mmol/L      Potassium 3 8 mmol/L      Chloride 101 mmol/L      CO2 22 mmol/L      ANION GAP 11 mmol/L      BUN 8 mg/dL      Creatinine 0 63 mg/dL      Glucose 100 mg/dL      Calcium 9 2 mg/dL      eGFR 122 ml/min/1 73sq m     Narrative:       [place] guidelines for Chronic Kidney Disease (CKD):     Stage 1 with normal or high GFR (GFR > 90 mL/min/1 73 square meters)    Stage 2 Mild CKD (GFR = 60-89 mL/min/1 73 square meters)    Stage 3A Moderate CKD (GFR = 45-59 mL/min/1 73 square meters)    Stage 3B Moderate CKD (GFR = 30-44 mL/min/1 73 square meters)    Stage 4 Severe CKD (GFR = 15-29 mL/min/1 73 square meters)    Stage 5 End Stage CKD (GFR <15 mL/min/1 73 square meters)  Note: GFR calculation is accurate only with a steady state creatinine    Troponin I [[id number]]  (Normal) Collected:  01/28/20 1823    Lab Status:  Final result Specimen:  Blood from Arm, Left Updated:  01/28/20 1851     Troponin I <0 03 ng/mL     D-Dimer [[id number]]  (Normal) Collected:  01/28/20 1823    Lab Status:  Final result Specimen:  Blood from Arm, Left Updated:  01/28/20 1846     D-Dimer, Quant <0 15 ug/ml FEU     CBC and differential [[id number]]  (Abnormal) Collected:  01/28/20 1823    Lab Status:  Final result Specimen:  Blood from Arm, Left Updated:  01/28/20 1830     WBC 10 20 Thousand/uL      RBC 4 67 Million/uL      Hemoglobin 13 7 g/dL      Hematocrit 40 7 %      MCV 87 fL      MCH 29 3 pg      MCHC 33 7 g/dL      RDW 12 8 %      MPV 7 0 fL      Platelets 099 Thousands/uL      Neutrophils Relative 46 %      Lymphocytes Relative 44 %      Monocytes Relative 7 %      Eosinophils Relative 3 %      Basophils Relative 1 %      Neutrophils Absolute 4 70 Thousands/µL      Lymphocytes Absolute 4 50 Thousands/µL      Monocytes Absolute 0 70 Thousand/µL      Eosinophils Absolute 0 30 Thousand/µL      Basophils Absolute 0 10 Thousands/µL     POCT pregnancy, urine [[id number]]  (Normal) Resulted:  01/28/20 1819    Lab Status:  Final result Updated:  01/28/20 1820     EXT PREG TEST UR (Ref: Negative) negative     Control valid                 XR chest 2 views   ED Interpretation by Vinay Lopez PA-C (01/28 1850)   No evidence of acute cardiopulmonary process                   Procedures  Procedures         ED Course  ED Course as of Jan 28 1916 Tue Jan 28, 2020 1777 PREGNANCY TEST URINE: negative   1822 Blood Pressure: 161/100   1822 Temperature: 98 3 °F (36 8 °C)   1822 Pulse: 94   1822 Respirations: 18   1822 SpO2: 98 %   1832 WBC: 10 20   1832 Hemoglobin: 13 7   1832 Platelet Count(!): 912   1849 D-Dimer, Quant: <0 15   1849 WBC: 10 20   1849 Hemoglobin: 13 7   1849 Platelet Count(!): 657 Agip U  96  and metabolic panel likely discharge on nonsteroidal anti-inflammatories      1854 Troponin I: <0 03   1913 eGFR: 122   1913 Creatinine: 0 63   1913 Sodium: 134                               MDM      Disposition  Final diagnoses:   Costochondritis     Time reflects when diagnosis was documented in both MDM as applicable and the Disposition within this note     Time User Action Codes Description Comment    1/28/2020  7:16 PM Zena Armendariz Alissa [M94 0] Costochondritis       ED Disposition     ED Disposition Condition Date/Time Comment    Discharge Stable Tue Jan 28, 2020  7:15 PM [facility] discharge to home/self care  Follow-up Information     Follow up With Specialties Details Why Contact Info    Jacky Diane DO Family Medicine Schedule an appointment as soon as possible for a visit  As needed formerly [facility] [phone]             Patient's Medications   Discharge Prescriptions    No medications on file     No discharge procedures on file      ED Provider  Electronically Signed by           Vannesa Montelongo PA-C  01/28/20 1916

## 2020-02-05 ENCOUNTER — SOCIAL WORK (OUTPATIENT)
Dept: BEHAVIORAL/MENTAL HEALTH CLINIC | Facility: CLINIC | Age: 30
End: 2020-02-05
Payer: COMMERCIAL

## 2020-02-05 DIAGNOSIS — F33.1 DEPRESSION, MAJOR, RECURRENT, MODERATE (HCC): Chronic | ICD-10-CM

## 2020-02-05 DIAGNOSIS — F41.1 GENERALIZED ANXIETY DISORDER: Primary | Chronic | ICD-10-CM

## 2020-02-05 PROCEDURE — 90834 PSYTX W PT 45 MINUTES: CPT | Performed by: SOCIAL WORKER
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
## 2020-02-13 ENCOUNTER — TELEPHONE (OUTPATIENT)
Dept: NEUROLOGY | Facility: CLINIC | Age: 30
End: 2020-02-13

## 2020-02-13 NOTE — TELEPHONE ENCOUNTER
Patient called in stating she no longer has [employer]- she states she has new insurance as of 1/31/2020  Patient states she currently just has Access [facility]) this card is currently scanned into the patient's chart  Patient states she will be getting another primary insurance but is unsure when  Patient states if she gets new insurance before her April appointment she will contact our office and let us know

## 2020-02-14 NOTE — TELEPHONE ENCOUNTER
Called and spoke to pt - explain to her that as of 01/01/2020 [facility] SPECIALTY [facility] no longer accepts Access  Pt stated that she will call her insurance and change it to [employer]  Per pt she will call back with new insurance information

## 2020-02-17 ENCOUNTER — TELEPHONE (OUTPATIENT)
Dept: NEUROLOGY | Facility: CLINIC | Age: 30
End: 2020-02-17

## 2020-02-17 NOTE — TELEPHONE ENCOUNTER
Received a call from [facility]  New PA is needed for [employer]  Pt has ACCESS CARD which we do have on file  Spoke with pt  She will be getting new medicaid soon but unsure when  She would like us to submit through access now  Pt is now getting 2-3 migraines monthly and used to get about 9  PA Access form completed with last office note  Unfortunately PA may be denied to to no documentation of improvement on [employer]  Emailed to [employer] for Hamzah Noel for when she returns  [employer],  please fax signed form and scan into chart

## 2020-02-18 ENCOUNTER — TELEPHONE (OUTPATIENT)
Dept: NEUROLOGY | Facility: CLINIC | Age: 30
End: 2020-02-18

## 2020-02-18 NOTE — TELEPHONE ENCOUNTER
Called patient to reschedule 3/6/20 a[ppointment with [facility] for a sooner appointment on 2/24/20 in CV with [facility]  Left message on voicemail for a call back if interested in sooner appointment   Thank you

## 2020-02-20 ENCOUNTER — OFFICE VISIT (OUTPATIENT)
Dept: BEHAVIORAL/MENTAL HEALTH CLINIC | Facility: CLINIC | Age: 30
End: 2020-02-20
Payer: COMMERCIAL

## 2020-02-20 DIAGNOSIS — F33.1 DEPRESSION, MAJOR, RECURRENT, MODERATE (HCC): Chronic | ICD-10-CM

## 2020-02-20 DIAGNOSIS — F41.1 GENERALIZED ANXIETY DISORDER: ICD-10-CM

## 2020-02-20 DIAGNOSIS — F41.1 GENERALIZED ANXIETY DISORDER: Primary | Chronic | ICD-10-CM

## 2020-02-20 PROCEDURE — 90853 GROUP PSYCHOTHERAPY: CPT | Performed by: SOCIAL WORKER

## 2020-02-20 RX ORDER — ESCITALOPRAM OXALATE 10 MG/1
10 TABLET ORAL DAILY
Qty: 30 TABLET | Refills: 2 | Status: SHIPPED | OUTPATIENT
Start: 2020-02-20 | End: 2020-04-15 | Stop reason: SDUPTHER

## 2020-02-21 ENCOUNTER — TELEPHONE (OUTPATIENT)
Dept: NEUROLOGY | Facility: CLINIC | Age: 30
End: 2020-02-21

## 2020-02-21 ENCOUNTER — SOCIAL WORK (OUTPATIENT)
Dept: BEHAVIORAL/MENTAL HEALTH CLINIC | Facility: CLINIC | Age: 30
End: 2020-02-21
Payer: COMMERCIAL

## 2020-02-21 DIAGNOSIS — F41.1 GENERALIZED ANXIETY DISORDER: Chronic | ICD-10-CM

## 2020-02-21 DIAGNOSIS — F33.1 DEPRESSION, MAJOR, RECURRENT, MODERATE (HCC): Primary | Chronic | ICD-10-CM

## 2020-02-21 PROCEDURE — 90834 PSYTX W PT 45 MINUTES: CPT | Performed by: SOCIAL WORKER

## 2020-02-21 NOTE — PSYCH
Psychotherapy Provided: Individual Psychotherapy 45 minutes     Length of time in session: 45 minutes, follow up in 2 weeks    Goals addressed in session: Goal 1     Pain:      moderate to severe (improved somewhat)-- anxiety and pain from fibromyalgia    Current suicide risk : Low     DATA: Met with Stephanie for scheduled individual session  "I'm feeling really stressed " Mirellalaure Sherley states that her appointment with her prescriber (Dr Shani Diego) was cancelled, and she is now scheduled to see her in May  Monica Lepe reports that she met her new , who she feels comfortable with  She states that this  has a plan regarding how she plans to move forward with her disability case  Mirellalaure Sherley also discussed her current insurance situation  As a result of the preliminary decision against her regarding her disability, her employer-sponsored insurance has been dropped  She now has straight medical assistance, until she is able to get on a Health Choices plan  "I am still doing yoga, and it's going good " Monica Lepe states that she tried going twice with her friend, but she was not able to continue with this, as it is too strenuous for her  She continues to participate in "Gentle Yoga" with her previous therapist--through a Zoom application  Her goal, at this point, is to get used to the gentle yoga and work her way up to going to the community-based class  She reports she has been working on having a healthy diet  She has been using [employer] and eating healthy for the past two weeks  Monica Lepe states she continues to enjoy the [facility] group  She reports that she had some struggle--due to an increase in depressive symptoms--with getting herself motivated to go to the group; however, she states that she is glad that she went and enjoys the group members  Monica Lepe states that, as of March, she will be out of the relationship with her ex- for a year  Monica Lepe continues to explore opportunities for dating   She is currently dating two men  One of the men Katie Ellis) has been showing jealously and possessiveness, which she is trying to avoid  She states that she is not interested in a relationship that includes possessiveness  "I feel like I would fall back into going past every red flag "       ASSESSMENT: Alma Godoy presents with a primarily euthymic mood, with some anxiety  Her affect is normal and mood-congruent  Alma Godoy exhibits a positive therapeutic rapport with this clinician  Alma Godoy appears to have normal insight and judgment  Alma Godoy continues to exhibit willingness to work on treatment goals and objectives  PLAN: Alma Godoy will return in two weeks for the next scheduled session  Between sessions, Alma Godoy will continue to practice yoga and other mindfulness-based strategies to manage her anxiety and physical pain and will report back during the next session re: successes and barriers  At the next session, this clinician will use client-centered therapy, mindfulness-based strategies, DBT-informed skills and solution-focused therapy to address her anxiety, in an effort to assist Alma Godoy with meeting treatment goals  Behavioral Health Treatment Plan [facility]: Diagnosis and Treatment Plan explained to Boo Whittington relates understanding diagnosis and is agreeable to Treatment Plan   Yes

## 2020-02-21 NOTE — TELEPHONE ENCOUNTER
Received non-preferred medication prior auth form fir [employer] via email  Reviewed and signed by Mehdi Burdick  Faxed to Office of Medical Assistance Programs 1 [phone]  To be scanned into patient chart

## 2020-02-21 NOTE — TELEPHONE ENCOUNTER
Called and spoke to pt - she has applied for [employer], however she has not heard back yet

## 2020-02-24 ENCOUNTER — OFFICE VISIT (OUTPATIENT)
Dept: NEUROLOGY | Facility: CLINIC | Age: 30
End: 2020-02-24

## 2020-02-24 ENCOUNTER — TELEPHONE (OUTPATIENT)
Dept: NEUROLOGY | Facility: CLINIC | Age: 30
End: 2020-02-24

## 2020-02-24 VITALS
DIASTOLIC BLOOD PRESSURE: 91 MMHG | BODY MASS INDEX: 31.48 KG/M2 | HEIGHT: 63 IN | HEART RATE: 99 BPM | WEIGHT: 177.7 LBS | SYSTOLIC BLOOD PRESSURE: 142 MMHG

## 2020-02-24 DIAGNOSIS — G43.709 CHRONIC MIGRAINE WITHOUT AURA WITHOUT STATUS MIGRAINOSUS, NOT INTRACTABLE: Primary | ICD-10-CM

## 2020-02-24 PROCEDURE — 1036F TOBACCO NON-USER: CPT | Performed by: PHYSICIAN ASSISTANT

## 2020-02-24 PROCEDURE — 3008F BODY MASS INDEX DOCD: CPT | Performed by: PHYSICIAN ASSISTANT

## 2020-02-24 PROCEDURE — 99212 OFFICE O/P EST SF 10 MIN: CPT | Performed by: PHYSICIAN ASSISTANT

## 2020-02-24 RX ORDER — CLONAZEPAM 1 MG/1
TABLET ORAL
COMMUNITY
Start: 2019-12-23 | End: 2020-05-06

## 2020-02-24 NOTE — PROGRESS NOTES
Patient ID: Judi Pérez is a 30 y o  female  Assessment/Plan:    Chronic migraine without aura without status migrainosus, not intractable  Migraine headaches are controlled on a combination of botox and Aimovig injection  Since starting botox, the patient reports greater than 7 days of migraine relief from baseline, correlated with headache diary, decreased abortive medication use and decreased ER visits  She added Aimovig to botox for about 4 months now and reports further >50% reduction in migraine severity and frequency since adding it  Continue imitrex +/- Toradol IM injection prn migraine  Diagnoses and all orders for this visit:    Chronic migraine without aura without status migrainosus, not intractable    Other orders  -     clonazePAM (KlonoPIN) 1 mg tablet       The patient should not hesitate to call me prior to her follow up with any questions or concerns  The patient was instructed to urgently call 911 or present to the nearest emergency room with any new or worsening neurological deficits  Subjective:    HPI    Ms  Judi Pérez is a pleasant 29 yo right handed female who presents for neurological follow-up for chronic migraines  She has ankylosing spondylitis and fibromyalgia  Continues to follow with psychiatry and counseling at [place] [address]; currently on Abilify, Lexapro, Wellbutrin and Lyrica  She currently has disability      She continues medical marijuana for rheum pain  She obtains this from Dr Colt Cerda- family doctor of her uncle      She continues botox q3 months and finds it very helpful  Since starting botox, the patient reports greater than 7 days of migraine relief from baseline, correlated with headache diary, decreased abortive medication use and decreased ER visits  Since starting the Aimovig injection she reports >50% reduction in migraines; reports 5-6 migraines per month now compared to 15-20 migraines   Still taking imitrex and/or toradol injection at migraine onset and these work well  She continues to have insomnia in that she falls alseep with some difficulty and then wakes up after a few hours and then cannot fall back asleep  Denies LAM per sleep study, denies RLS     Continues toradol injection prn severe migraine  She also finds benefit from imitrex  mg, states nasal spray imitrex caused bad taste in her mouth  The following portions of the patient's history were reviewed and updated as appropriate:   She  has a past medical history of Abnormal Pap smear of cervix, Anxiety, Arthritis, Depression, Fibromyalgia, primary, and Migraine    She   Patient Active Problem List    Diagnosis Date Noted    Well adult exam 75/89/7905    Other complicated headache syndrome 09/10/2019    Insomnia due to medical condition 09/10/2019    Chronic heel pain, left 08/28/2019    Irritant contact dermatitis due to oils 08/19/2019    Depression, major, recurrent, moderate (HCC) 07/01/2019    URI (upper respiratory infection) 04/24/2019    Hand dermatitis 04/12/2019    Intractable migraine with aura without status migrainosus 01/11/2019    Sleep disturbance 11/27/2018    Snoring 11/27/2018    Fibromyalgia syndrome 11/27/2018    Obesity (BMI 30-39 9) 11/27/2018    Upper respiratory infection 11/13/2018    Dental infection 09/12/2018    Possible pregnancy 07/26/2018    Migraine with aura and with status migrainosus 07/23/2018    Cognitive decline 06/27/2018    Memory loss 06/15/2018    Chronic fatigue 05/24/2018    Alternating constipation and diarrhea 04/17/2018    Abnormal bowel habits 04/17/2018    Chronic migraine without aura without status migrainosus, not intractable 03/13/2018    Anterior chest wall pain 02/22/2018    Migraine headache 06/22/2017    Cervical radiculitis 06/10/2016    Seasonal allergies 03/24/2015    Lumbar radiculopathy 01/05/2015    Vitamin B12 deficiency 07/30/2014    Hyperlipidemia 03/24/2014    Vitamin D deficiency 02/26/2014    Generalized anxiety disorder 11/19/2012     She  has a past surgical history that includes Tonsillectomy; [place] tooth extraction; and Colonoscopy (N/A, 5/8/2018)  Her family history includes Alcohol abuse in her father and mother; Anxiety disorder in her father; Cancer in her other; Depression in her maternal grandmother; Diabetes in her other; Diabetes unspecified in her mother; Drug abuse in her mother; Hypertension in her mother; Other in her mother and other; Psoriasis in her mother; Rheum arthritis in her mother; Sjogren's syndrome in her mother  She  reports that she has never smoked  She has never used smokeless tobacco  She reports that she has current or past drug history  Drug: Marijuana  She reports that she does not drink alcohol  Current Outpatient Medications   Medication Sig Dispense Refill    ALPRAZolam (XANAX) 0 25 mg tablet Take 1 tablet (0 25 mg total) by mouth 2 (two) times a day as needed for anxiety 30 tablet 0    ARIPiprazole (ABILIFY) 2 mg tablet Take 1 tablet (2 mg total) by mouth daily 30 tablet 2    B-D 3CC LUER-DEANN SYR 25GX1" 25G X 1" 3 ML MISC       buPROPion (WELLBUTRIN XL) 150 mg 24 hr tablet Take 1 tablet (150 mg total) by mouth daily 30 tablet 2    Cholecalciferol (VITAMIN D3) 11411 units CAPS Take by mouth once a week      Erenumab-aooe 140 MG/ML SOAJ Inject 140 mg under the skin every 30 (thirty) days 1 pen 11    escitalopram (LEXAPRO) 10 mg tablet Take 1 tablet (10 mg total) by mouth daily 30 tablet 2    etonogestrel-ethinyl estradiol (NUVARING) 0 12-0 015 MG/24HR vaginal ring Insert ring for 21 days then remove for one week   3 each 1    ketorolac (TORADOL) 60 mg/2 mL Inject 1-2 mL (30-60 mg total) into a muscle every 6 (six) hours as needed for moderate pain 6 mL 1    melatonin 1 mg 1-2 tabs qhs prn insomnia 60 tablet 2    naproxen (NAPROSYN) 500 mg tablet Take 1 tablet (500 mg total) by mouth as needed for headaches 60 tablet 1  pregabalin (LYRICA) 25 mg capsule Take 1 capsule (25 mg total) by mouth 2 (two) times a day 60 capsule 3    prochlorperazine (COMPAZINE) 10 mg tablet Take 1 tablet (10 mg total) by mouth every 6 (six) hours as needed for nausea or vomiting 30 tablet 0    SUMAtriptan (IMITREX) 100 mg tablet Take 1 tablet (100 mg total) by mouth once as needed for migraine for up to 1 dose 9 tablet 2    Syringe/Needle, Disp, (SYRINGE 3CC/51KY9-5/4") 27G X 1-1/4" 3 ML MISC Use for IM injection of ketorolac  4 each 0    clonazePAM (KlonoPIN) 1 mg tablet       onabotulinumtoxin A (BOTOX) 100 units Inject as directed       No current facility-administered medications for this visit  She is allergic to depakote er  [valproic acid] and desvenlafaxine            Objective:    Blood pressure 142/91, pulse 99, height 5' 3" (1 6 m), weight 80 6 kg (177 lb 11 2 oz)  Physical Exam    Neurological Exam  Vital signs reviewed  Well developed, well nourished  Head: Normocephalic, atraumatic  CN 6-62: intact and symmetric, including EOMs which are normal b/l and PERRL  Fundi b/l are normal to crude ophthalmological examination  MSK: 5/5 t/o  ROM normal x all 4 extr  Sensation: Inact to LT and temp x4 extr  Reflexes: 2+ and symmetric in all 4 extr  Coordination: Nml x4 extr  Gait: Steady normal gait  ROS:    Review of Systems   Constitutional: Negative  HENT: Negative  Phonophobia    Eyes: Positive for photophobia  Respiratory: Negative  Cardiovascular: Negative  Gastrointestinal: Positive for nausea  Endocrine: Negative  Genitourinary: Negative  Musculoskeletal: Negative  Skin: Negative  Allergic/Immunologic: Negative  Neurological: Positive for headaches  Hematological: Negative  Psychiatric/Behavioral: Positive for sleep disturbance (difficulty falling and staying asleep )  The patient is nervous/anxious        The following portions of the patient's history were reviewed and updated as appropriate: allergies, current medications/ medication history, past family history, past medical history, past social history, past surgical history and problem list     Review of systems was reviewed and otherwise unremarkable from a neurological perspective

## 2020-02-24 NOTE — TELEPHONE ENCOUNTER
----- Message from Mary De Los Santos PA-C sent at 2/24/2020 11:41 AM EST -----  I just finished my note in order to re-approve Aimovig  Can we proceed with PA? Thanks

## 2020-02-24 NOTE — ASSESSMENT & PLAN NOTE
Migraine headaches are controlled on a combination of botox and Aimovig injection  Since starting botox, the patient reports greater than 7 days of migraine relief from baseline, correlated with headache diary, decreased abortive medication use and decreased ER visits  She added Aimovig to botox for about 4 months now and reports further >50% reduction in migraine severity and frequency since adding it  Continue imitrex +/- Toradol IM injection prn migraine

## 2020-02-26 NOTE — TELEPHONE ENCOUNTER
PA received  Completed  Emailed to [employer] for Za[employer]ox  Form to be faxed along with office note to number on form  Please also scan a signed copy to chart

## 2020-02-26 NOTE — TELEPHONE ENCOUNTER
Call received from pharmacy  Emgality requires PA  Attempted to submit PA through [facility]  [place]  Access [phone]  ID: [id number]  Group: -   PCN: [id number]  BIN: [id number]    Call placed to [place] Medicaid  They will be faxing PA form over  Awaiting form

## 2020-02-26 NOTE — TELEPHONE ENCOUNTER
Prescribed emgality  please tell her im sorry about the aimovig, but emgality is very effective  Lets try and if not effective will retry aimovig per insurance rules

## 2020-02-28 DIAGNOSIS — Z30.44 ENCOUNTER FOR SURVEILLANCE OF VAGINAL RING HORMONAL CONTRACEPTIVE DEVICE: ICD-10-CM

## 2020-02-28 NOTE — TELEPHONE ENCOUNTER
Called and spoke to pt - she informed me that she has not heard anything back from [employer] as of yet

## 2020-02-28 NOTE — PSYCH
Group: LGBTQIA+ Healthy Living Group      Duration: 1 hour    Topic(s) covered: Self-care    Group summary: This group was focused on self-care practices, especially related to anxiety management  The group started with introductions, including name, preferred pronoun, and one-word description of a way that the group member manages anxiety  A new member joined the group today and introduced himself to the other members  The facilitator provided a brief explanation of the purpose of the group--a place were individuals can share about their mental health issues in a safe-space, where they do not need to edit tmselves regarding their sexual orientation and gender identity  Group members shared their experiences managing their anxiety and other mental health symptoms  One group member discussed traumatic experiences related to [employer] and spirituality  This opened a new topic for the group members  Many group members discussed the challenges of finding a spiritual home that is accepting and welcoming to the [facility]  Individual Summary:      Data: Stephanie attended today's group  She was quiet yet attentive  She did not spontaneously participate in the group discussion, but she did respond when prompted  Bernardo Motta identified that she finds her medication to be her most effective tool for managing her anxiety  Assessment: Bernardo Motta appeared  with a somewhat anxious mood  Her affect was full range and congruent with Her mood  She appeared to be attentive throughout the group and was quiet, yet appropriate, in her interactions with the other group members  Plan: Bernardo Motta will return to the next group  She will continue to attend individual sessions with this clinician and will discuss her response to the group session This clinician will encourage Stephanie to increase her active participation in the group process      Next Group in two weeks: March 5, 2020 @ 2:00pm

## 2020-03-02 ENCOUNTER — OFFICE VISIT (OUTPATIENT)
Dept: FAMILY MEDICINE CLINIC | Facility: CLINIC | Age: 30
End: 2020-03-02
Payer: COMMERCIAL

## 2020-03-02 VITALS
BODY MASS INDEX: 31.36 KG/M2 | DIASTOLIC BLOOD PRESSURE: 68 MMHG | HEIGHT: 63 IN | SYSTOLIC BLOOD PRESSURE: 136 MMHG | WEIGHT: 177 LBS

## 2020-03-02 DIAGNOSIS — F33.1 DEPRESSION, MAJOR, RECURRENT, MODERATE (HCC): Chronic | ICD-10-CM

## 2020-03-02 DIAGNOSIS — G43.009 MIGRAINE WITHOUT AURA AND WITHOUT STATUS MIGRAINOSUS, NOT INTRACTABLE: Primary | ICD-10-CM

## 2020-03-02 PROCEDURE — 1036F TOBACCO NON-USER: CPT | Performed by: FAMILY MEDICINE

## 2020-03-02 PROCEDURE — 99213 OFFICE O/P EST LOW 20 MIN: CPT | Performed by: FAMILY MEDICINE

## 2020-03-02 PROCEDURE — 3008F BODY MASS INDEX DOCD: CPT | Performed by: FAMILY MEDICINE

## 2020-03-02 RX ORDER — ETONOGESTREL AND ETHINYL ESTRADIOL 11.7; 2.7 MG/1; MG/1
INSERT, EXTENDED RELEASE VAGINAL
Qty: 3 EACH | Refills: 0 | Status: SHIPPED | OUTPATIENT
Start: 2020-03-02 | End: 2020-07-06 | Stop reason: SDUPTHER

## 2020-03-02 NOTE — PROGRESS NOTES
Assessment/Plan:  Patient is improving overall  Migraines have been last   Depression is better  Patient will continue to follow with psychiatry as well as Neurology  Patient stop Lyrica due to leg pain improving  Patient will follow-up 3 months       Diagnoses and all orders for this visit:    Migraine without aura and without status migrainosus, not intractable    Depression, major, recurrent, moderate (HCC)            Subjective:        Patient ID: Rock Villarreal is a 30 y o  female  Patient follow-up on migraines and depression  Patient's migraines have been much improved with mm vague  Patient having roughly for headaches per month  Mood is slightly better overall  No homicidal suicidal ideation  Patient is eating better overall  The following portions of the patient's history were reviewed and updated as appropriate: allergies, current medications, past family history, past medical history, past social history, past surgical history and problem list       Review of Systems   Constitutional: Negative  HENT: Negative  Eyes: Negative  Respiratory: Negative  Cardiovascular: Negative  Gastrointestinal: Negative  Endocrine: Negative  Genitourinary: Negative  Musculoskeletal: Negative  Skin: Negative  Allergic/Immunologic: Negative  Neurological: Positive for headaches  Hematological: Negative  Psychiatric/Behavioral: Positive for dysphoric mood  Objective:      BMI Counseling: Body mass index is 31 35 kg/m²  The BMI is above normal  Nutrition recommendations include decreasing portion sizes  Exercise recommendations include moderate physical activity 150 minutes/week  /68 (BP Location: Right arm)   Ht 5' 3" (1 6 m)   Wt 80 3 kg (177 lb)   BMI 31 35 kg/m²          Physical Exam   Constitutional: She appears well-developed and well-nourished  No distress  HENT:   Head: Normocephalic     Right Ear: External ear normal    Left Ear: External ear normal    Mouth/Throat: Oropharynx is clear and moist  No oropharyngeal exudate  Eyes: Pupils are equal, round, and reactive to light  EOM are normal  Right eye exhibits no discharge  Left eye exhibits no discharge  No scleral icterus  Neck: Normal range of motion  Neck supple  No thyromegaly present  Cardiovascular: Normal rate, regular rhythm, normal heart sounds and intact distal pulses  Exam reveals no gallop and no friction rub  No murmur heard  Pulmonary/Chest: Effort normal and breath sounds normal  No respiratory distress  She has no wheezes  She has no rales  She exhibits no tenderness  Musculoskeletal: Normal range of motion  She exhibits no edema or tenderness  Lymphadenopathy:     She has no cervical adenopathy  Neurological: She is alert  No cranial nerve deficit  She exhibits normal muscle tone  Coordination normal    Skin: Skin is warm and dry  No rash noted  She is not diaphoretic  No erythema  No pallor  Psychiatric: She has a normal mood and affect  Her behavior is normal  Judgment and thought content normal    Nursing note and vitals reviewed

## 2020-03-04 ENCOUNTER — SOCIAL WORK (OUTPATIENT)
Dept: BEHAVIORAL/MENTAL HEALTH CLINIC | Facility: CLINIC | Age: 30
End: 2020-03-04
Payer: COMMERCIAL

## 2020-03-04 DIAGNOSIS — F41.1 GENERALIZED ANXIETY DISORDER: Chronic | ICD-10-CM

## 2020-03-04 DIAGNOSIS — F33.1 DEPRESSION, MAJOR, RECURRENT, MODERATE (HCC): Primary | Chronic | ICD-10-CM

## 2020-03-04 PROCEDURE — 90834 PSYTX W PT 45 MINUTES: CPT | Performed by: SOCIAL WORKER

## 2020-03-04 NOTE — PSYCH
Psychotherapy Provided: Individual Psychotherapy 45 minutes     Length of time in session: 45 minutes, follow up in 2 weeks    Goals addressed in session: Goal 1     Pain:      mild-- anxiety approximately a 3/10 for today    Current suicide risk : Low     DATA: Met with Stephanie for scheduled individual session  "It's been four weeks since I started eating healthy  I started monitoring my calories two weeks ago " Karissa Blum states that she is feeling like this is making a positive difference in her life  She is attending [employer] night every Tuesday night with a family friend  She continues to participate in the [employer] program, and she wants to work on getting to a point where she can do Level 1 Yoga in a class setting  She states she went to her PCP on Monday  She is working on discontinuing her Lyrica  She states that she has not needed to use any marijuana for the past two weeks  She states that her pain level is more manageable  Karissa Blum states that the second  she met with did not take her case  She states that she feels that Stephanie's age will make it difficult for her to win her case, because she can "learn to do other things  Karissa Blum has an SSDI hearing on May 5th  She does not currently have an  to represent her in this case, but she is considering getting one  Karissa Blum states that she wants to work  At this point, she is not sure what type of work she would want to do  She is considering going back to school, but she does not know what she would want to take  She states that she had previously wanted to be a  and went to school for art (at [employer] and the [employer])  She also had a dream to be a nurse, but she knows she is not able to do that, due to her physical health issues  We discussed the options and resources available to her--e g , [employer] and their online resources   She states that she is frustrated with her overall cognitive abilities and her feelings of not being able to function at the level she thinks she should be  She states that, due to this, her anxiety is heightened  She states that she believes that her memory has been impacted by her complex migraines  We spent some time reviewing and revising Stephanie's treatment plan  She is not currently at goal but does report that she feels she is making progress in her overall recovery efforts  ASSESSMENT: Jim Fortune presents with a normal mood  Her affect is normal and mood-congruent  Jim Fortune exhibits a positive therapeutic rapport with this clinician  Jim Fortune appears to have normal insight and judgment  Jim Fortune continues to exhibit willingness to work on treatment goals and objectives  PLAN: Jim Fortune will return in two weeks for the next scheduled session  Between sessions, Jim Fortune will continue to practice positive physical and emotional self-care (including physical exercise and use of mindfulness-based strategies) and will report back during the next session re: successes and barriers  At the next session, this clinician will use client-centered therapy, mindfulness-based strategies, DBT-informed skills, CBT techniques and solution-focused therapy to address her anxiety, in an effort to assist Jim Fortune with meeting treatment goals  Behavioral Health Treatment Plan [facility]: Diagnosis and Treatment Plan explained to Kami Villafuerte relates understanding diagnosis and is agreeable to Treatment Plan   Yes

## 2020-03-04 NOTE — BH TREATMENT PLAN
Alice Cintron  1990       Date of Initial Treatment Plan: June 26, 2019   Date of Current Treatment Plan: 03/04/20    Treatment Plan Number 3     Strengths/Personal Resources for Self Care: "I am somewhat focused " Carmen Bhakta is motivated to improve her overall physical and mental health  Supportive family and friends    Diagnosis:   1  Depression, major, recurrent, moderate ([address] )     2  Generalized anxiety disorder         Area of Needs: Anxiety      Long Term Goal 1: "I want to manage my anxiety, decrease my need for medication, and be able to go out more "     Target Date: July 2, 2020  Treatment Plan Expiration Date: August 31, 2020   Completion Date: to be determined         Short Term Objectives for Goal 1:            1  Cruz will identify triggers and prompting events that increase symptoms of anxiety and depression          2  Jory Bess will learn and exhibit understanding of a minimum of three distress tolerance skills and emotion regulation skills to assist with symptom reduction    * Carmen Bhakta is currently involved in yoga ([employer]) 1x per week and would like to increase her ability to participate in in-person classes  * Stephanie currently practices mindfulness-based strategies approx 3x per week  She would like to practice on a daily basis  * Carmen Bhakta is currently increasing her exercise  She states she feels that exercise, at this point, is a good distraction          3  Cruz will increase her daily physical activity to a duration of 15-30 minutes (including walking, swimming, stretching, etc )    * At this point, Carmen Bhakta is currently exercising daily  She will continue with this objective to ensure that she sustains there motivation for exercise          4  Cruz will increase her social interactions (outside of her home) to a minimum of 3x per week    * Carmen Bhakta states she is socializing approximately 1x per week   She is not at goal for this objective; therefore, this objective will remain          5  Mimi Chin will learn and utilize formal mindfulness-based strategies a minimum of 10 minutes every day    * Not at goal          6  Mimi Chin will maintain a level of anxiety that does not surpass a 4/10 on most days  Incidents that surpass this limit will be process in therapy sessions  * At this point, Jovana Mcdonnell reports that her anxiety is approximately a 5/10 on most days  Since she is not yet at goal, we will continue to monitor her anxiety levels   7  Jovana Mcdonnell will maintain adherence with medication management sessions and her medication regimen  * Stephanie uses medical marijuana, and she has noted a decrease in her need for medical marijuana    * Jovana Mcdonnell has a goal to decrease her behavioral health medications, in the future, with her psychiatrist's assistance  GOAL 1: Modality: Individual 1-2x per month   Completion Date to be determined, Medication Management and The person(s) responsible for carrying out the plan is  Jovana Mcdonnell (client); Malena Ross (clinician); Dr Tess Rouse (psychiatrist)    Clinician will use client-centered therapy, mindfulness-based strategies, DBT-informed skills and solution-focused therapy to address Cruz's symptoms of anxiety  Mimi Chin will practice skills between sessions and will report back, during subsequent sessions regarding successes and barriers  Behavioral Health Treatment Plan [facility]: Diagnosis and Treatment Plan explained to Kristyn Ansari relates understanding diagnosis and is agreeable to Treatment Plan  Client Comments : Please share your thoughts, feelings, need and/or experiences regarding your treatment plan: "I think things are going pretty well   I think I've managed to be stable "

## 2020-03-06 NOTE — TELEPHONE ENCOUNTER
I received a call from pt - she stated that her [employer] will be active as of 4/1/20  I informed pt that we can keep her 4/8 apt for now in case it gets approved before then, if not we will call her to reschedule her apt  Pt also would like for prior auth for Aimovig be sent to [id number]  as well

## 2020-03-18 DIAGNOSIS — G43.709 CHRONIC MIGRAINE WITHOUT AURA WITHOUT STATUS MIGRAINOSUS, NOT INTRACTABLE: ICD-10-CM

## 2020-03-18 NOTE — TELEPHONE ENCOUNTER
----- Message from Jasson Vergara MA sent at 3/18/2020 10:11 AM EDT -----  Regarding: FW: RE: RE: Non-Urgent Medical Question  Contact: [phone]      ----- Message -----  From: Bobafia Kenyon  Sent: 3/18/2020  10:07 AM EDT  To: [facility]  Subject: RE: RE: RE: Non-Urgent Medical Question          Will some send a prescription over? My pharmacy doesnt have anything    ----- Message -----  From: Nurse Allyn Reyna  Sent: 3/18/20 9:39 AM  To: Cruz Schneider  Subject: RE: RE: Non-Urgent Medical Question    Thank you for contacting [place] [facility] [address] [facility],    You should continue Emgality and if you find it ineffective after April 1st you should let us know and then we can attempt to get Aimovig covered under your new insurance  However, if Emgality is effective we would have you continue it  Please do not hesitate to call our office at [phone]  Thank you for choosing [facility] for your care      ----- Message -----     From: Bobafia Kenyon     Sent: 3/18/2020  8:35 AM EDT       To: Nury Pickett PA-C  Subject: RE: RE: Non-Urgent Medical Question    I'm getting a new insurance April 1st  Should I be using the Emgality till I see if I an approved for Maribel Andi with the new insurance?    ----- Message -----  From: Nurse Allyn Reyna  Sent: 3/18/20 8:32 AM  To: Cruz Schneider  Subject: RE: Non-Urgent Medical Question    Thank you for contacting [place] [facility] [address] [facility],    Your Antonina Praveena was denied however, the alternative Emgality was approved  You should be able to pick this up at [place] [address]  Please do not hesitate to call our office at [phone]  Thank you for choosing [facility] for your care       ----- Message -----     From: Jd Prescott     Sent: 3/18/2020  7:53 AM EDT       To: Nury Pickett PA-C  Subject: Non-Urgent Medical Question    Hi Juris Dines,  Since I havent had my Aimovig I've been having about 2 migraines a week   Is there anything I can do till I get approval? Maybe there's some kind of sample or something I can get  I'm just going crazy with these migraines, I dont know what I can do       Thanks,  [employer]

## 2020-03-19 NOTE — TELEPHONE ENCOUNTER
[address] Radha Valle stated the emgality requires a PA  We have an active PA on file     Faxed this PA approval to [facility] @ [phone]

## 2020-03-20 ENCOUNTER — TELEMEDICINE (OUTPATIENT)
Dept: BEHAVIORAL/MENTAL HEALTH CLINIC | Facility: CLINIC | Age: 30
End: 2020-03-20

## 2020-03-20 DIAGNOSIS — F33.1 DEPRESSION, MAJOR, RECURRENT, MODERATE (HCC): Chronic | ICD-10-CM

## 2020-03-20 DIAGNOSIS — F41.1 GENERALIZED ANXIETY DISORDER: Primary | Chronic | ICD-10-CM

## 2020-03-20 PROCEDURE — NC001 PR NO CHARGE: Performed by: SOCIAL WORKER

## 2020-03-20 NOTE — PSYCH
Virtual Brief Visit    Reason for visit is behavioral health check-in session, due to the coronavirus      Encounter provider APARNA Cordova    Provider located at [address] [place] [address]      Recent Visits  No visits were found meeting these conditions  Showing recent visits within past 7 days and meeting all other requirements     Future Appointments  No visits were found meeting these conditions  Showing future appointments within next 150 days and meeting all other requirements        Patient agrees to participate in a virtual check in via telephone or video visit instead of presenting to the office to address urgent/immediate medical needs  Patient is aware this is a billable service  After connecting through telephone, the patient was identified by name and date of birth  Sheila Garza was informed that this was a telemedicine visit and that the visit is being conducted through telephone which may not be secure and therefore might not be HIPAA-compliant  My office door was closed  No one else was in the room  She acknowledged consent and understanding of privacy and security of the virtual check-in visit  I informed the patient that I have reviewed her record in Epic and presented the opportunity for her to ask any questions regarding the visit today  The patient initiated communication and agreed to participate  Kiel Low is a 30 y o  female     Charmian Arpitamer states she is doing ok at this point in time  She reports that she is trying to decrease her social contacts, due to her autoimmune disorder  She states she is limiting her exposure to the news  She identifies that watching the news "will skyrocket my anxiety " She states that she did have a panic attack the other day  She states she had a migraine the day prior to the panic attack, and there was a trigger that prompted the panic   She took a xanax, and she was able to regain her emotion regulation  She states she has been having an increase in migraine activity since she has not been able to get the Aimovig  She states that she will be able to get Emgality, and is hoping that she will be able to get some relief  She states she is worried that she will not be able to get her scheduled botox injections (scheduled for April 8th)  Garo Cooper states that she has been going out for walks every day and she has been working on her weight loss goals  She expressed a lot of excitement that she was able to go to [place] [employer] by herself  She tends to avoid social situations, and she went to the store, to get supplies, on her own  We discussed her social supports  Garo Cooper has been able to visit her best friend, and she is engaging in telephone contact with others  She has cut back on the contact with one of the men she was seeing  She states he was being controlling with her, and she determined this was not healthy for her  Past Medical History:   Diagnosis Date    Abnormal Pap smear of cervix     Anxiety     Arthritis     Depression     Fibromyalgia, primary     Migraine        Past Surgical History:   Procedure Laterality Date    COLONOSCOPY N/A 5/8/2018    Procedure: COLONOSCOPY;  Surgeon: Vernon Nunez MD;  Location: [facility];   Service: Gastroenterology    TONSILLECTOMY      WISDOM TOOTH EXTRACTION         Current Outpatient Medications   Medication Sig Dispense Refill    ALPRAZolam (XANAX) 0 25 mg tablet Take 1 tablet (0 25 mg total) by mouth 2 (two) times a day as needed for anxiety 30 tablet 0    ARIPiprazole (ABILIFY) 2 mg tablet Take 1 tablet (2 mg total) by mouth daily 30 tablet 2    B-D 3CC LUER-DEANN SYR 25GX1" 25G X 1" 3 ML MISC       buPROPion (WELLBUTRIN XL) 150 mg 24 hr tablet Take 1 tablet (150 mg total) by mouth daily 30 tablet 2    Cholecalciferol (VITAMIN D3) 99611 units CAPS Take by mouth once a week      clonazePAM (KlonoPIN) 1 mg tablet       escitalopram (LEXAPRO) 10 mg tablet Take 1 tablet (10 mg total) by mouth daily 30 tablet 2    etonogestrel-ethinyl estradiol (NuvaRing) 0 12-0 015 MG/24HR vaginal ring Insert ring for 21 days then remove for one week  3 each 0    Galcanezumab-gnlm (Emgality) 120 MG/ML SOAJ One ml subcutaneous on the right thigh and 1 ml subcutaneous on the left thigh at the same time for 1 dose  Please call for refill  2 pen 0    ketorolac (TORADOL) 60 mg/2 mL Inject 1-2 mL (30-60 mg total) into a muscle every 6 (six) hours as needed for moderate pain 6 mL 1    melatonin 1 mg 1-2 tabs qhs prn insomnia 60 tablet 2    naproxen (NAPROSYN) 500 mg tablet Take 1 tablet (500 mg total) by mouth as needed for headaches 60 tablet 1    onabotulinumtoxin A (BOTOX) 100 units Inject as directed      pregabalin (LYRICA) 25 mg capsule Take 1 capsule (25 mg total) by mouth 2 (two) times a day 60 capsule 3    prochlorperazine (COMPAZINE) 10 mg tablet Take 1 tablet (10 mg total) by mouth every 6 (six) hours as needed for nausea or vomiting 30 tablet 0    SUMAtriptan (IMITREX) 100 mg tablet Take 1 tablet (100 mg total) by mouth once as needed for migraine for up to 1 dose 9 tablet 2    Syringe/Needle, Disp, (SYRINGE 3CC/64KY0-2/4") 27G X 1-1/4" 3 ML MISC Use for IM injection of ketorolac  4 each 0     No current facility-administered medications for this visit  Allergies   Allergen Reactions    Depakote Er  [Valproic Acid]      Other reaction(s): dilated pupils, "schizi"    Desvenlafaxine      Other reaction(s): state of confusion       Assessment    Cruz telephone assessment is Soto Robertson appears to be managing her anxiety appropriately at this time  She expresses appropriate use of her exisiting coping skills  She seems to be maintaining a healthy routine (including diet and exercise, as well as limiting her interpersonal contacts)   Soto Robertson continues to exhibit a positive therapeutic rapport with this clinician          Disposition:    Oksana Gallardoer reports she will continue to practice her mindfulness and anxiety-management skills  She is interested in video-sessions, if they are available prior to the end of the COVID crisis  She will call this clinician if any issues arise prior to our next session  I spent 20 minutes with the patient during this virtual check-in visit

## 2020-03-25 ENCOUNTER — TELEMEDICINE (OUTPATIENT)
Dept: BEHAVIORAL/MENTAL HEALTH CLINIC | Facility: CLINIC | Age: 30
End: 2020-03-25
Payer: COMMERCIAL

## 2020-03-25 DIAGNOSIS — F33.1 DEPRESSION, MAJOR, RECURRENT, MODERATE (HCC): Primary | Chronic | ICD-10-CM

## 2020-03-25 DIAGNOSIS — F41.1 GENERALIZED ANXIETY DISORDER: Chronic | ICD-10-CM

## 2020-03-25 PROCEDURE — 90853 GROUP PSYCHOTHERAPY: CPT | Performed by: SOCIAL WORKER

## 2020-03-26 ENCOUNTER — TELEPHONE (OUTPATIENT)
Dept: NEUROLOGY | Facility: CLINIC | Age: 30
End: 2020-03-26

## 2020-03-26 DIAGNOSIS — R63.5 WEIGHT GAIN: Primary | ICD-10-CM

## 2020-03-26 RX ORDER — PHENTERMINE HYDROCHLORIDE 37.5 MG/1
37.5 TABLET ORAL DAILY
Qty: 30 TABLET | Refills: 0 | Status: SHIPPED | OUTPATIENT
Start: 2020-03-26 | End: 2020-05-04 | Stop reason: SDUPTHER

## 2020-03-26 NOTE — TELEPHONE ENCOUNTER
Patient called in stating she just received her new insurance cards  Patient provided me with the following information  [email or web address]  ID #: [id number]        Botox PA submitted to [employer] on 3/26/2020  Will await approval/denial letter

## 2020-03-27 ENCOUNTER — TELEPHONE (OUTPATIENT)
Dept: FAMILY MEDICINE CLINIC | Facility: CLINIC | Age: 30
End: 2020-03-27

## 2020-03-27 NOTE — TELEPHONE ENCOUNTER
Spoke with pharmacist  Patient's insurance does not cover phentermine but patient can pay out of pocket $13 19 for one-month supply  I called patient to inform her  She understands

## 2020-03-27 NOTE — TELEPHONE ENCOUNTER
Pharmacy c/i states that phentermine cannot be sent electronic  Need paper script  Pt will need to bring in to pharmacy  ([facility])

## 2020-03-31 NOTE — TELEPHONE ENCOUNTER
Per [employer] patient's insurance is not active until Wednesday 4/1/2020  Will re-submit first thing in the morning for authorization

## 2020-03-31 NOTE — TELEPHONE ENCOUNTER
Botox PA submitted to [employer] on 3/26/2020  Will await approval/denial letter  Please see other open telephone encounter  Patient dad called and scheduled appt with Dr. Garsia for 11/6/18

## 2020-04-02 ENCOUNTER — DOCUMENTATION (OUTPATIENT)
Dept: NEUROLOGY | Facility: CLINIC | Age: 30
End: 2020-04-02

## 2020-04-02 ENCOUNTER — TELEMEDICINE (OUTPATIENT)
Dept: BEHAVIORAL/MENTAL HEALTH CLINIC | Facility: CLINIC | Age: 30
End: 2020-04-02
Payer: COMMERCIAL

## 2020-04-02 DIAGNOSIS — F33.1 DEPRESSION, MAJOR, RECURRENT, MODERATE (HCC): Chronic | ICD-10-CM

## 2020-04-02 DIAGNOSIS — F41.1 GENERALIZED ANXIETY DISORDER: Primary | Chronic | ICD-10-CM

## 2020-04-02 PROCEDURE — 90837 PSYTX W PT 60 MINUTES: CPT | Performed by: SOCIAL WORKER

## 2020-04-02 NOTE — TELEPHONE ENCOUNTER
Received an approval letter from [employer] for the patient's Botox authorization  Approval letter is scanned into the patient's chart under "media" for future reference      Authorization information:    Authorization #: [id number]  Valid dates: 4/8/2020 until 4/8/20201- valid for 4 visits (up to 800 units)  Please use our stock

## 2020-04-07 ENCOUNTER — TELEPHONE (OUTPATIENT)
Dept: NEUROLOGY | Facility: CLINIC | Age: 30
End: 2020-04-07

## 2020-04-08 ENCOUNTER — PROCEDURE VISIT (OUTPATIENT)
Dept: NEUROLOGY | Facility: CLINIC | Age: 30
End: 2020-04-08
Payer: COMMERCIAL

## 2020-04-08 ENCOUNTER — TELEPHONE (OUTPATIENT)
Dept: NEUROLOGY | Facility: CLINIC | Age: 30
End: 2020-04-08

## 2020-04-08 VITALS — TEMPERATURE: 98 F

## 2020-04-08 DIAGNOSIS — G43.709 CHRONIC MIGRAINE WITHOUT AURA WITHOUT STATUS MIGRAINOSUS, NOT INTRACTABLE: Primary | ICD-10-CM

## 2020-04-08 PROCEDURE — 64615 CHEMODENERV MUSC MIGRAINE: CPT | Performed by: PHYSICIAN ASSISTANT

## 2020-04-08 RX ORDER — SUMATRIPTAN 100 MG/1
100 TABLET, FILM COATED ORAL ONCE AS NEEDED
Qty: 9 TABLET | Refills: 2 | Status: SHIPPED | OUTPATIENT
Start: 2020-04-08 | End: 2020-08-27

## 2020-04-10 ENCOUNTER — TELEMEDICINE (OUTPATIENT)
Dept: BEHAVIORAL/MENTAL HEALTH CLINIC | Facility: CLINIC | Age: 30
End: 2020-04-10
Payer: COMMERCIAL

## 2020-04-10 DIAGNOSIS — F41.1 GENERALIZED ANXIETY DISORDER: Chronic | ICD-10-CM

## 2020-04-10 DIAGNOSIS — F33.1 DEPRESSION, MAJOR, RECURRENT, MODERATE (HCC): Primary | Chronic | ICD-10-CM

## 2020-04-10 PROCEDURE — 90834 PSYTX W PT 45 MINUTES: CPT | Performed by: SOCIAL WORKER

## 2020-04-15 ENCOUNTER — TELEPHONE (OUTPATIENT)
Dept: PSYCHIATRY | Facility: CLINIC | Age: 30
End: 2020-04-15

## 2020-04-15 DIAGNOSIS — F41.1 GENERALIZED ANXIETY DISORDER: ICD-10-CM

## 2020-04-15 DIAGNOSIS — G43.709 CHRONIC MIGRAINE WITHOUT AURA WITHOUT STATUS MIGRAINOSUS, NOT INTRACTABLE: ICD-10-CM

## 2020-04-15 DIAGNOSIS — M79.7 FIBROMYALGIA: ICD-10-CM

## 2020-04-15 RX ORDER — BUPROPION HYDROCHLORIDE 150 MG/1
150 TABLET ORAL DAILY
Qty: 30 TABLET | Refills: 2 | Status: SHIPPED | OUTPATIENT
Start: 2020-04-15 | End: 2020-08-17 | Stop reason: SDUPTHER

## 2020-04-15 RX ORDER — ESCITALOPRAM OXALATE 10 MG/1
10 TABLET ORAL DAILY
Qty: 30 TABLET | Refills: 2 | Status: SHIPPED | OUTPATIENT
Start: 2020-04-15 | End: 2020-08-27 | Stop reason: SDUPTHER

## 2020-04-15 RX ORDER — ARIPIPRAZOLE 2 MG/1
2 TABLET ORAL DAILY
Qty: 30 TABLET | Refills: 2 | Status: SHIPPED | OUTPATIENT
Start: 2020-04-15 | End: 2020-08-05 | Stop reason: SDUPTHER

## 2020-04-28 ENCOUNTER — TELEMEDICINE (OUTPATIENT)
Dept: FAMILY MEDICINE CLINIC | Facility: CLINIC | Age: 30
End: 2020-04-28
Payer: COMMERCIAL

## 2020-04-28 DIAGNOSIS — E53.8 VITAMIN B12 DEFICIENCY: ICD-10-CM

## 2020-04-28 DIAGNOSIS — R20.2 PARESTHESIAS: Primary | ICD-10-CM

## 2020-04-28 PROCEDURE — 99213 OFFICE O/P EST LOW 20 MIN: CPT | Performed by: FAMILY MEDICINE

## 2020-05-01 ENCOUNTER — TELEMEDICINE (OUTPATIENT)
Dept: BEHAVIORAL/MENTAL HEALTH CLINIC | Facility: CLINIC | Age: 30
End: 2020-05-01
Payer: COMMERCIAL

## 2020-05-01 DIAGNOSIS — F41.1 GENERALIZED ANXIETY DISORDER: Chronic | ICD-10-CM

## 2020-05-01 DIAGNOSIS — F33.1 DEPRESSION, MAJOR, RECURRENT, MODERATE (HCC): Primary | Chronic | ICD-10-CM

## 2020-05-01 PROCEDURE — 90837 PSYTX W PT 60 MINUTES: CPT | Performed by: SOCIAL WORKER

## 2020-05-04 ENCOUNTER — OFFICE VISIT (OUTPATIENT)
Dept: FAMILY MEDICINE CLINIC | Facility: CLINIC | Age: 30
End: 2020-05-04
Payer: COMMERCIAL

## 2020-05-04 VITALS
HEIGHT: 63 IN | BODY MASS INDEX: 31.89 KG/M2 | WEIGHT: 180 LBS | TEMPERATURE: 97.5 F | SYSTOLIC BLOOD PRESSURE: 122 MMHG | DIASTOLIC BLOOD PRESSURE: 86 MMHG

## 2020-05-04 DIAGNOSIS — R63.5 WEIGHT GAIN: ICD-10-CM

## 2020-05-04 DIAGNOSIS — E53.8 VITAMIN B12 DEFICIENCY: ICD-10-CM

## 2020-05-04 DIAGNOSIS — E55.9 VITAMIN D DEFICIENCY: ICD-10-CM

## 2020-05-04 DIAGNOSIS — E66.9 OBESITY (BMI 30-39.9): ICD-10-CM

## 2020-05-04 DIAGNOSIS — R20.2 PARESTHESIAS: Primary | ICD-10-CM

## 2020-05-04 PROCEDURE — 1036F TOBACCO NON-USER: CPT | Performed by: FAMILY MEDICINE

## 2020-05-04 PROCEDURE — 99214 OFFICE O/P EST MOD 30 MIN: CPT | Performed by: FAMILY MEDICINE

## 2020-05-04 PROCEDURE — 3008F BODY MASS INDEX DOCD: CPT | Performed by: FAMILY MEDICINE

## 2020-05-04 RX ORDER — CYANOCOBALAMIN 1000 UG/ML
1000 INJECTION INTRAMUSCULAR; SUBCUTANEOUS
Status: CANCELLED | OUTPATIENT
Start: 2020-05-04

## 2020-05-04 RX ORDER — PHENTERMINE HYDROCHLORIDE 37.5 MG/1
37.5 TABLET ORAL DAILY
Qty: 30 TABLET | Refills: 1 | Status: SHIPPED | OUTPATIENT
Start: 2020-05-04 | End: 2020-08-27

## 2020-05-05 RX ORDER — CYANOCOBALAMIN 1000 UG/ML
1000 INJECTION INTRAMUSCULAR; SUBCUTANEOUS
Status: SHIPPED | OUTPATIENT
Start: 2020-05-05

## 2020-05-05 RX ADMIN — CYANOCOBALAMIN 1000 MCG: 1000 INJECTION INTRAMUSCULAR; SUBCUTANEOUS at 11:16

## 2020-05-06 ENCOUNTER — TELEMEDICINE (OUTPATIENT)
Dept: PSYCHIATRY | Facility: CLINIC | Age: 30
End: 2020-05-06
Payer: COMMERCIAL

## 2020-05-06 DIAGNOSIS — F41.1 GENERALIZED ANXIETY DISORDER: Primary | Chronic | ICD-10-CM

## 2020-05-06 DIAGNOSIS — F33.1 DEPRESSION, MAJOR, RECURRENT, MODERATE (HCC): Chronic | ICD-10-CM

## 2020-05-06 PROCEDURE — 99213 OFFICE O/P EST LOW 20 MIN: CPT | Performed by: PSYCHIATRY & NEUROLOGY

## 2020-05-11 ENCOUNTER — OFFICE VISIT (OUTPATIENT)
Dept: OBGYN CLINIC | Facility: MEDICAL CENTER | Age: 30
End: 2020-05-11
Payer: COMMERCIAL

## 2020-05-11 VITALS — BODY MASS INDEX: 31.5 KG/M2 | WEIGHT: 177.8 LBS | DIASTOLIC BLOOD PRESSURE: 88 MMHG | SYSTOLIC BLOOD PRESSURE: 118 MMHG

## 2020-05-11 DIAGNOSIS — R39.9 UTI SYMPTOMS: ICD-10-CM

## 2020-05-11 DIAGNOSIS — B37.3 VAGINAL YEAST INFECTION: ICD-10-CM

## 2020-05-11 DIAGNOSIS — N76.0 ACUTE VAGINITIS: Primary | ICD-10-CM

## 2020-05-11 PROCEDURE — 87510 GARDNER VAG DNA DIR PROBE: CPT | Performed by: OBSTETRICS & GYNECOLOGY

## 2020-05-11 PROCEDURE — 87480 CANDIDA DNA DIR PROBE: CPT | Performed by: OBSTETRICS & GYNECOLOGY

## 2020-05-11 PROCEDURE — 1036F TOBACCO NON-USER: CPT | Performed by: OBSTETRICS & GYNECOLOGY

## 2020-05-11 PROCEDURE — 87660 TRICHOMONAS VAGIN DIR PROBE: CPT | Performed by: OBSTETRICS & GYNECOLOGY

## 2020-05-11 PROCEDURE — 99213 OFFICE O/P EST LOW 20 MIN: CPT | Performed by: OBSTETRICS & GYNECOLOGY

## 2020-05-13 LAB
CANDIDA RRNA VAG QL PROBE: POSITIVE
G VAGINALIS RRNA GENITAL QL PROBE: POSITIVE
T VAGINALIS RRNA GENITAL QL PROBE: NEGATIVE

## 2020-05-14 DIAGNOSIS — N76.0 BV (BACTERIAL VAGINOSIS): Primary | ICD-10-CM

## 2020-05-14 DIAGNOSIS — B96.89 BV (BACTERIAL VAGINOSIS): Primary | ICD-10-CM

## 2020-05-14 RX ORDER — METRONIDAZOLE 500 MG/1
500 TABLET ORAL EVERY 12 HOURS SCHEDULED
Qty: 14 TABLET | Refills: 0 | Status: SHIPPED | OUTPATIENT
Start: 2020-05-14 | End: 2020-05-21

## 2020-05-18 ENCOUNTER — CLINICAL SUPPORT (OUTPATIENT)
Dept: FAMILY MEDICINE CLINIC | Facility: CLINIC | Age: 30
End: 2020-05-18
Payer: COMMERCIAL

## 2020-05-18 DIAGNOSIS — E53.8 VITAMIN B12 DEFICIENCY: Primary | ICD-10-CM

## 2020-05-18 RX ORDER — CYANOCOBALAMIN 1000 UG/ML
1000 INJECTION INTRAMUSCULAR; SUBCUTANEOUS
Status: SHIPPED | OUTPATIENT
Start: 2020-05-18

## 2020-05-18 RX ADMIN — CYANOCOBALAMIN 1000 MCG: 1000 INJECTION INTRAMUSCULAR; SUBCUTANEOUS at 11:46

## 2020-05-20 ENCOUNTER — DOCUMENTATION (OUTPATIENT)
Dept: NEUROLOGY | Facility: CLINIC | Age: 30
End: 2020-05-20

## 2020-05-21 ENCOUNTER — TELEMEDICINE (OUTPATIENT)
Dept: BEHAVIORAL/MENTAL HEALTH CLINIC | Facility: CLINIC | Age: 30
End: 2020-05-21
Payer: COMMERCIAL

## 2020-05-21 DIAGNOSIS — F33.1 DEPRESSION, MAJOR, RECURRENT, MODERATE (HCC): Chronic | ICD-10-CM

## 2020-05-21 DIAGNOSIS — F41.1 GENERALIZED ANXIETY DISORDER: Primary | Chronic | ICD-10-CM

## 2020-05-21 PROCEDURE — 90834 PSYTX W PT 45 MINUTES: CPT | Performed by: SOCIAL WORKER

## 2020-06-02 ENCOUNTER — OFFICE VISIT (OUTPATIENT)
Dept: OBGYN CLINIC | Facility: MEDICAL CENTER | Age: 30
End: 2020-06-02
Payer: COMMERCIAL

## 2020-06-02 ENCOUNTER — OFFICE VISIT (OUTPATIENT)
Dept: FAMILY MEDICINE CLINIC | Facility: CLINIC | Age: 30
End: 2020-06-02
Payer: COMMERCIAL

## 2020-06-02 VITALS
SYSTOLIC BLOOD PRESSURE: 128 MMHG | HEIGHT: 63 IN | BODY MASS INDEX: 31.71 KG/M2 | WEIGHT: 179 LBS | TEMPERATURE: 98.2 F | DIASTOLIC BLOOD PRESSURE: 88 MMHG

## 2020-06-02 VITALS
BODY MASS INDEX: 31.63 KG/M2 | SYSTOLIC BLOOD PRESSURE: 122 MMHG | DIASTOLIC BLOOD PRESSURE: 80 MMHG | HEIGHT: 63 IN | WEIGHT: 178.5 LBS

## 2020-06-02 DIAGNOSIS — M79.7 FIBROMYALGIA SYNDROME: ICD-10-CM

## 2020-06-02 DIAGNOSIS — G43.709 CHRONIC MIGRAINE WITHOUT AURA WITHOUT STATUS MIGRAINOSUS, NOT INTRACTABLE: Primary | ICD-10-CM

## 2020-06-02 DIAGNOSIS — E53.8 VITAMIN B12 DEFICIENCY: ICD-10-CM

## 2020-06-02 DIAGNOSIS — R20.2 PARESTHESIAS: ICD-10-CM

## 2020-06-02 DIAGNOSIS — E78.2 MIXED HYPERLIPIDEMIA: ICD-10-CM

## 2020-06-02 DIAGNOSIS — R39.9 UTI SYMPTOMS: Primary | ICD-10-CM

## 2020-06-02 PROBLEM — J02.0 PHARYNGITIS DUE TO STREPTOCOCCUS SPECIES: Status: ACTIVE | Noted: 2020-06-02

## 2020-06-02 PROCEDURE — 99214 OFFICE O/P EST MOD 30 MIN: CPT | Performed by: FAMILY MEDICINE

## 2020-06-02 PROCEDURE — 87086 URINE CULTURE/COLONY COUNT: CPT | Performed by: OBSTETRICS & GYNECOLOGY

## 2020-06-02 PROCEDURE — 1036F TOBACCO NON-USER: CPT | Performed by: FAMILY MEDICINE

## 2020-06-02 PROCEDURE — 3008F BODY MASS INDEX DOCD: CPT | Performed by: OBSTETRICS & GYNECOLOGY

## 2020-06-02 PROCEDURE — 87077 CULTURE AEROBIC IDENTIFY: CPT | Performed by: OBSTETRICS & GYNECOLOGY

## 2020-06-02 PROCEDURE — 1036F TOBACCO NON-USER: CPT | Performed by: OBSTETRICS & GYNECOLOGY

## 2020-06-02 PROCEDURE — 87186 SC STD MICRODIL/AGAR DIL: CPT | Performed by: OBSTETRICS & GYNECOLOGY

## 2020-06-02 PROCEDURE — 99213 OFFICE O/P EST LOW 20 MIN: CPT | Performed by: OBSTETRICS & GYNECOLOGY

## 2020-06-02 PROCEDURE — 3008F BODY MASS INDEX DOCD: CPT | Performed by: FAMILY MEDICINE

## 2020-06-02 RX ADMIN — CYANOCOBALAMIN 1000 MCG: 1000 INJECTION INTRAMUSCULAR; SUBCUTANEOUS at 11:07

## 2020-06-03 ENCOUNTER — TELEPHONE (OUTPATIENT)
Dept: FAMILY MEDICINE CLINIC | Facility: CLINIC | Age: 30
End: 2020-06-03

## 2020-06-03 DIAGNOSIS — J02.0 PHARYNGITIS DUE TO STREPTOCOCCUS SPECIES: Primary | ICD-10-CM

## 2020-06-03 RX ORDER — AMOXICILLIN 500 MG/1
1000 TABLET, FILM COATED ORAL 2 TIMES DAILY
Qty: 28 TABLET | Refills: 0 | Status: SHIPPED | OUTPATIENT
Start: 2020-06-03 | End: 2020-06-10

## 2020-06-04 ENCOUNTER — TELEMEDICINE (OUTPATIENT)
Dept: BEHAVIORAL/MENTAL HEALTH CLINIC | Facility: CLINIC | Age: 30
End: 2020-06-04
Payer: COMMERCIAL

## 2020-06-04 DIAGNOSIS — F41.1 GENERALIZED ANXIETY DISORDER: Chronic | ICD-10-CM

## 2020-06-04 DIAGNOSIS — N30.00 ACUTE CYSTITIS WITHOUT HEMATURIA: Primary | ICD-10-CM

## 2020-06-04 DIAGNOSIS — F33.1 DEPRESSION, MAJOR, RECURRENT, MODERATE (HCC): Primary | Chronic | ICD-10-CM

## 2020-06-04 PROCEDURE — 90834 PSYTX W PT 45 MINUTES: CPT | Performed by: SOCIAL WORKER

## 2020-06-04 RX ORDER — NITROFURANTOIN 25; 75 MG/1; MG/1
100 CAPSULE ORAL 2 TIMES DAILY
Qty: 10 CAPSULE | Refills: 0 | Status: SHIPPED | OUTPATIENT
Start: 2020-06-04 | End: 2020-06-09

## 2020-06-05 LAB
BACTERIA UR CULT: ABNORMAL
BACTERIA UR CULT: ABNORMAL

## 2020-06-18 ENCOUNTER — TELEMEDICINE (OUTPATIENT)
Dept: BEHAVIORAL/MENTAL HEALTH CLINIC | Facility: CLINIC | Age: 30
End: 2020-06-18
Payer: COMMERCIAL

## 2020-06-18 DIAGNOSIS — F33.1 DEPRESSION, MAJOR, RECURRENT, MODERATE (HCC): Chronic | ICD-10-CM

## 2020-06-18 DIAGNOSIS — F41.1 GENERALIZED ANXIETY DISORDER: Primary | Chronic | ICD-10-CM

## 2020-06-18 PROCEDURE — 90834 PSYTX W PT 45 MINUTES: CPT | Performed by: SOCIAL WORKER

## 2020-06-24 DIAGNOSIS — M79.7 FIBROMYALGIA: ICD-10-CM

## 2020-06-24 RX ORDER — PREGABALIN 25 MG/1
25 CAPSULE ORAL 2 TIMES DAILY
Qty: 60 CAPSULE | Refills: 2 | Status: SHIPPED | OUTPATIENT
Start: 2020-06-24 | End: 2020-10-20 | Stop reason: SDUPTHER

## 2020-06-24 NOTE — TELEPHONE ENCOUNTER
Pharmacy is calling for refill of Lyrica  Patient was seen on 6/2/20 and has follow up for 9/1/20  Have placed order and put  on desk  Thank you

## 2020-06-30 DIAGNOSIS — G43.709 CHRONIC MIGRAINE WITHOUT AURA WITHOUT STATUS MIGRAINOSUS, NOT INTRACTABLE: Primary | ICD-10-CM

## 2020-07-01 ENCOUNTER — CLINICAL SUPPORT (OUTPATIENT)
Dept: FAMILY MEDICINE CLINIC | Facility: CLINIC | Age: 30
End: 2020-07-01
Payer: COMMERCIAL

## 2020-07-01 DIAGNOSIS — E53.8 VITAMIN B12 DEFICIENCY: Primary | ICD-10-CM

## 2020-07-01 RX ADMIN — CYANOCOBALAMIN 1000 MCG: 1000 INJECTION INTRAMUSCULAR; SUBCUTANEOUS at 08:55

## 2020-07-06 ENCOUNTER — ANNUAL EXAM (OUTPATIENT)
Dept: OBGYN CLINIC | Facility: MEDICAL CENTER | Age: 30
End: 2020-07-06
Payer: COMMERCIAL

## 2020-07-06 VITALS
HEIGHT: 63 IN | SYSTOLIC BLOOD PRESSURE: 132 MMHG | BODY MASS INDEX: 32.6 KG/M2 | DIASTOLIC BLOOD PRESSURE: 84 MMHG | WEIGHT: 184 LBS | TEMPERATURE: 97.3 F

## 2020-07-06 DIAGNOSIS — Z01.419 ENCOUNTER FOR GYNECOLOGICAL EXAMINATION WITH PAPANICOLAOU SMEAR OF CERVIX: Primary | ICD-10-CM

## 2020-07-06 DIAGNOSIS — Z30.44 ENCOUNTER FOR SURVEILLANCE OF VAGINAL RING HORMONAL CONTRACEPTIVE DEVICE: ICD-10-CM

## 2020-07-06 PROCEDURE — 87624 HPV HI-RISK TYP POOLED RSLT: CPT | Performed by: NURSE PRACTITIONER

## 2020-07-06 PROCEDURE — G0145 SCR C/V CYTO,THINLAYER,RESCR: HCPCS | Performed by: PATHOLOGY

## 2020-07-06 PROCEDURE — G0124 SCREEN C/V THIN LAYER BY MD: HCPCS | Performed by: PATHOLOGY

## 2020-07-06 PROCEDURE — 99395 PREV VISIT EST AGE 18-39: CPT | Performed by: NURSE PRACTITIONER

## 2020-07-06 RX ORDER — ETONOGESTREL AND ETHINYL ESTRADIOL 11.7; 2.7 MG/1; MG/1
INSERT, EXTENDED RELEASE VAGINAL
Qty: 3 EACH | Refills: 3 | Status: CANCELLED | OUTPATIENT
Start: 2020-07-06 | End: 2021-02-25

## 2020-07-06 RX ORDER — ETONOGESTREL AND ETHINYL ESTRADIOL 11.7; 2.7 MG/1; MG/1
INSERT, EXTENDED RELEASE VAGINAL
Qty: 3 EACH | Refills: 3 | Status: SHIPPED | OUTPATIENT
Start: 2020-07-06 | End: 2021-07-14 | Stop reason: SDUPTHER

## 2020-07-06 NOTE — PROGRESS NOTES
ASSESSMENT & PLAN: Shanae Alexander is a 35 y o  Justina Jayla with normal gynecologic exam     1   Routine well woman exam done today  2  Pap and HPV:  The patient's last pap was 2017  It was abnormal     Pap was done today  Current ASCCP Guidelines reviewed  3   The following were reviewed in today's visit: breast self exam, STD testing, use and side effects of OCPs, adequate intake of calcium and vitamin D, exercise and healthy diet  4  rx for nuraring for the year sent to pharmacy  rto yearly gyn exam      CC:  Annual Gynecologic Examination    HPI: Shanae Alexander is a 35 y o  Justina Jayla who presents for annual gynecologic examination  She has the following concerns:  None           "loves nuva ring, takes extended cycling with no irregular bleeding "  Having flare of fM and other immune disease  Follows with endocrinologist      Health Maintenance:    She wears her seatbelt routinely  She does perform regular monthly self breast exams  She feels safe at home  Past Medical History:   Diagnosis Date    Abnormal Pap smear of cervix     Anxiety     Arthritis     Depression     Fibromyalgia, primary     Migraine        Past Surgical History:   Procedure Laterality Date    COLONOSCOPY N/A 2018    Procedure: COLONOSCOPY;  Surgeon: Dylan Desai MD;  Location: [facility]; Service: Gastroenterology    TONSILLECTOMY      WISDOM TOOTH EXTRACTION         Past OB/Gyn History:  OB History        0    Para   0    Term   0       0    AB   0    Living   0       SAB   0    TAB   0    Ectopic   0    Multiple   0    Live Births   0               Pt does not have menstrual issues  History of sexually transmitted infection: No   History of abnormal pap smears: Yes   Patient is currently sexually active  heterosexual   The current method of family planning is NuvaRing vaginal inserts      Family History   Problem Relation Age of Onset    Rheum arthritis Mother     Psoriasis Mother     Other Mother     Hypertension Mother     Diabetes unspecified Mother     Sjogren's syndrome Mother     Alcohol abuse Mother     Drug abuse Mother     Anxiety disorder Father     Alcohol abuse Father     Cancer Other     Diabetes Other     Other Other         High blood pressure    Depression Maternal Grandmother        Social History:  Social History     Socioeconomic History    Marital status: Single     Spouse name: Not on file    Number of children: 0    Years of education: 15    Highest education level: Not on file   Occupational History    Occupation: Unemployed   Social Needs    Financial resource strain: Very hard    Food insecurity:     Worry: Often true     Inability: Never true    Transportation needs:     Medical: No     Non-medical: No   Tobacco Use    Smoking status: Never Smoker    Smokeless tobacco: Never Used   Substance and Sexual Activity    Alcohol use: No    Drug use: Yes     Types: Marijuana     Comment: Medical marijuana    Sexual activity: Yes     Partners: Male     Comment: Nuva ring   Lifestyle    Physical activity:     Days per week: 0 days     Minutes per session: Not on file    Stress: Not on file   Relationships    Social connections:     Talks on phone: Not on file     Gets together: Not on file     Attends [employer] service: Not on file     Active member of club or organization: Not on file     Attends meetings of clubs or organizations: Not on file     Relationship status: Not on file    Intimate partner violence:     Fear of current or ex partner: Not on file     Emotionally abused: Not on file     Physically abused: Not on file     Forced sexual activity: Not on file   Other Topics Concern    Not on file   Social History Narrative    Home:  Living with mom and MGM        Education:    Pt denies any h/o learning disability Dxs but admits to having great difficulty in math requiring a     She reached childhood milestones on time as far as he knows  Graduated HS 2009    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 college art classes--she stopped due to anxiety from dissatisfaction with her classes--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 felt they were telling her what to create  On reflection, she feels it was moreso her anxiety as the cause of her leaving school, and she was using the other reason as an excuse so she would not have to admit to anxiety at that time  She is now very open about her anxiety and does not mind that I have this information in the general social section of her chart  Presently lives alone  Patient is single    Patient is currently unemployed     Allergies   Allergen Reactions    Depakote Er  [Valproic Acid]      Other reaction(s): dilated pupils, "schizi"    Desvenlafaxine      Other reaction(s): state of confusion         Current Outpatient Medications:     ARIPiprazole (ABILIFY) 2 mg tablet, Take 1 tablet (2 mg total) by mouth daily, Disp: 30 tablet, Rfl: 2    B-D 3CC LUER-DEANN SYR 25GX1" 25G X 1" 3 ML MISC, , Disp: , Rfl:     buPROPion (WELLBUTRIN XL) 150 mg 24 hr tablet, Take 1 tablet (150 mg total) by mouth daily, Disp: 30 tablet, Rfl: 2    Cholecalciferol (VITAMIN D3) 79298 units CAPS, Take by mouth once a week, Disp: , Rfl:     Erenumab-aooe 140 MG/ML SOAJ, Inject 140 mg under the skin every 30 (thirty) days, Disp: 1 pen, Rfl: 11    escitalopram (LEXAPRO) 10 mg tablet, Take 1 tablet (10 mg total) by mouth daily, Disp: 30 tablet, Rfl: 2    etonogestrel-ethinyl estradiol (NuvaRing) 0 12-0 015 MG/24HR vaginal ring, Insert ring for 21 days then remove for one week , Disp: 3 each, Rfl: 0    ketorolac (TORADOL) 60 mg/2 mL, Inject 1-2 mL (30-60 mg total) into a muscle every 6 (six) hours as needed for moderate pain, Disp: 6 mL, Rfl: 1    melatonin 1 mg, 1-2 tabs qhs prn insomnia, Disp: 60 tablet, Rfl: 2    naproxen (NAPROSYN) 500 mg tablet, Take 1 tablet (500 mg total) by mouth as needed for headaches, Disp: 60 tablet, Rfl: 1    onabotulinumtoxin A (BOTOX) 100 units, Inject as directed, Disp: , Rfl:     phentermine (ADIPEX-P) 37 5 MG tablet, Take 1 tablet (37 5 mg total) by mouth daily, Disp: 30 tablet, Rfl: 1    pregabalin (LYRICA) 25 mg capsule, Take 1 capsule (25 mg total) by mouth 2 (two) times a day, Disp: 60 capsule, Rfl: 2    prochlorperazine (COMPAZINE) 10 mg tablet, Take 1 tablet (10 mg total) by mouth every 6 (six) hours as needed for nausea or vomiting, Disp: 30 tablet, Rfl: 0    SUMAtriptan (IMITREX) 100 mg tablet, Take 1 tablet (100 mg total) by mouth once as needed for migraine for up to 1 dose, Disp: 9 tablet, Rfl: 2    Syringe/Needle, Disp, (SYRINGE 3CC/18DU3-8/4") 27G X 1-1/4" 3 ML MISC, Use for IM injection of ketorolac , Disp: 4 each, Rfl: 0    Current Facility-Administered Medications:     cyanocobalamin injection 1,000 mcg, 1,000 mcg, Intramuscular, Q30 Days, Oakes Cue, DO, 1,000 mcg at 07/01/20 0855    cyanocobalamin injection 1,000 mcg, 1,000 mcg, Intramuscular, Q14 Days, Taqueria Cue, DO, 1,000 mcg at 05/18/20 1146      Review of Systems  Constitutional :no fever, feels well, no tiredness, no recent weight gain or loss  ENT: no ear ache, no loss of hearing, no nosebleeds or nasal discharge, no sore throat or hoarseness  Cardiovascular: no complaints of slow or fast heart beat, no chest pain, no palpitations, no leg claudication or lower extremity edema  Respiratory: no complaints of shortness of shortness of breath, no MAURO  Breasts:no complaints of breast pain, breast lump, or nipple discharge  Gastrointestinal: no complaints of abdominal pain, constipation, nausea, vomiting, or diarrhea or bloody stools  Genitourinary : no complaints of dysuria, incontinence, pelvic pain, no dysmenorrhea, vaginal discharge or abnormal vaginal bleeding and as noted in HPI    Musculoskeletal: no complaints of arthralgia, no myalgia, no joint swelling or stiffness, no limb pain or swelling  Integumentary: no complaints of skin rash or lesion, itching or dry skin  Neurological: no complaints of headache, no confusion, no numbness or tingling, no dizziness or fainting    Objective      /84   Temp (!) 97 3 °F (36 3 °C) (Temporal)   Ht 5' 3" (1 6 m)   Wt 83 5 kg (184 lb)   BMI 32 59 kg/m²   General:   appears stated age, cooperative, alert normal mood and affect   Neck: normal, supple,trachea midline, no masses   Heart: regular rate and rhythm, S1, S2 normal, no murmur, click, rub or gallop   Lungs: clear to auscultation bilaterally   Breasts: normal appearance, no masses or tenderness   Abdomen: soft, non-tender, without masses or organomegaly   Vulva: normal   Vagina: normal vagina   Urethra: normal   Cervix: Normal, no discharge  Uterus: normal size, contour, position, consistency, mobility, non-tender   Adnexa: no mass, fullness, tenderness   Lymphatic palpation of lymph nodes in neck, axilla, groin and/or other locations: no lymphadenopathy or masses noted   Skin normal skin turgor and no rashes     Psychiatric orientation to person, place, and time: normal  mood and affect: normal

## 2020-07-07 ENCOUNTER — TELEPHONE (OUTPATIENT)
Dept: NEUROLOGY | Facility: CLINIC | Age: 30
End: 2020-07-07

## 2020-07-07 NOTE — TELEPHONE ENCOUNTER
LMOM to confirm 7/8/2020 9AM with [address] at [facility] and review covid screening questions and if patient would like to complete registration, please transfer to [address]  Please make aware of mask, visitor and temp policy

## 2020-07-07 NOTE — TELEPHONE ENCOUNTER
Confirmed appt - Registration completed  Covid-screening and travel screening questions completed  Aware of all policies

## 2020-07-08 ENCOUNTER — PROCEDURE VISIT (OUTPATIENT)
Dept: NEUROLOGY | Facility: CLINIC | Age: 30
End: 2020-07-08
Payer: COMMERCIAL

## 2020-07-08 VITALS
TEMPERATURE: 97.8 F | SYSTOLIC BLOOD PRESSURE: 130 MMHG | HEIGHT: 63 IN | HEART RATE: 105 BPM | BODY MASS INDEX: 32.64 KG/M2 | WEIGHT: 184.2 LBS | DIASTOLIC BLOOD PRESSURE: 87 MMHG

## 2020-07-08 DIAGNOSIS — G43.709 CHRONIC MIGRAINE WITHOUT AURA WITHOUT STATUS MIGRAINOSUS, NOT INTRACTABLE: Primary | ICD-10-CM

## 2020-07-08 PROCEDURE — 64615 CHEMODENERV MUSC MIGRAINE: CPT | Performed by: PHYSICIAN ASSISTANT

## 2020-07-08 NOTE — PROGRESS NOTES
Chemodenervation  Date/Time: 7/8/2020 9:00 AM  Performed by: Kim Willard PA-C  Authorized by: Kim Willard PA-C     Pre-procedure details:     Prepped With: Alcohol    Procedure details:     Position:  Upright  Botox:     Botox Type:  Type A    Brand:  Botox    mL's of Botulinum Toxin:  175    Final Concentration per CC:  100 units    Needle Gauge:  30 G 2 5 inch  Procedures:     Botox Procedures: chronic headache      Indications: migraines    Injection Location:     Head / Face:  L , R , L frontalis, R frontalis, R inferior cervical paraspinal, L inferior cervical paraspinal, L medial occipitalis, R medial occipitalis, L lateral occipitalis, R lateral occipitalis, procerus, L temporalis, R temporalis, R superior trapezius and L superior trapezius    L  injection amount:  5 unit(s)    R  injection amount:  5 unit(s)    L lateral frontalis:  5 unit(s)    R lateral frontalis:  5 unit(s)    L medial frontalis:  5 unit(s)    R medial frontalis:  5 unit(s)    L temporalis injection amount:  20 unit(s)    R temporalis injection amount:  20 unit(s)    Procerus injection amount:  5 unit(s)    L lateral occipitalis injection amount:  5 unit(s)    R lateral occipitalis injection amount:  5 unit(s)    L medial occipitalis injection amount:  10 unit(s)    R medial occipitalis injection amount:  10 unit(s)    L inferior cervical paraspinal injection amount:  10 unit(s)    R inferior cervical paraspinal injection amount:  10 unit(s)    L superior trapezius injection amount:  0 unit(s)    R superior trapezius injection amount:  0 unit(s)  Total Units:     Total units used:  175    Total units discarded:  25  Post-procedure details:     Chemodenervation:  Chronic migraine    Facial Nerve Location[de-identified]  Bilateral facial nerve    Patient tolerance of procedure:   Tolerated well, no immediate complications  Comments:      50 extra units in the temporoparietal regions b/l and headband region, medically necessary  Avoid traps b/l  Blood pressure 130/87, pulse 105, temperature 97 8 °F (36 6 °C), height 5' 3" (1 6 m), weight 83 6 kg (184 lb 3 2 oz)  She is having memory concerns/ word finding concerns  Will schedule 4-6 week f/u to discuss memory  Botox f/u 3 months

## 2020-07-10 ENCOUNTER — TELEMEDICINE (OUTPATIENT)
Dept: BEHAVIORAL/MENTAL HEALTH CLINIC | Facility: CLINIC | Age: 30
End: 2020-07-10
Payer: COMMERCIAL

## 2020-07-10 DIAGNOSIS — F33.1 DEPRESSION, MAJOR, RECURRENT, MODERATE (HCC): Primary | Chronic | ICD-10-CM

## 2020-07-10 DIAGNOSIS — F41.1 GENERALIZED ANXIETY DISORDER: Chronic | ICD-10-CM

## 2020-07-10 LAB
HPV HR 12 DNA CVX QL NAA+PROBE: POSITIVE
HPV16 DNA CVX QL NAA+PROBE: NEGATIVE
HPV18 DNA CVX QL NAA+PROBE: NEGATIVE
LAB AP GYN PRIMARY INTERPRETATION: ABNORMAL
Lab: ABNORMAL
PATH INTERP SPEC-IMP: ABNORMAL

## 2020-07-10 PROCEDURE — 90834 PSYTX W PT 45 MINUTES: CPT | Performed by: SOCIAL WORKER

## 2020-07-10 NOTE — PSYCH
Virtual Regular Visit      Assessment/Plan:    Problem List Items Addressed This Visit        Other    Generalized anxiety disorder (Chronic)    Depression, major, recurrent, moderate ([facility] [address] ) - Primary (Chronic)               Reason for visit is Behavioral Health session, conducted through video, due to COVID-19 precautions       Encounter provider APARNA North    Provider located at [id number] [place] [address] [address] [address]      Recent Visits  No visits were found meeting these conditions  Showing recent visits within past 7 days and meeting all other requirements     Future Appointments  No visits were found meeting these conditions  Showing future appointments within next 150 days and meeting all other requirements        The patient was identified by name and date of birth  Reta Ybarra was informed that this is a telemedicine visit and that the visit is being conducted through [employer]  My office door was closed  No one else was in the room  She acknowledged consent and understanding of privacy and security of the video platform  The patient has agreed to participate and understands they can discontinue the visit at any time  Patient is aware this is a billable service  Narcisa Banda is a 35 y o  female  DATA: Met with Stephanie for scheduled individual session  "I've been a little sad " Feliz Stark discussed a recent GYN appointment  During the appointment, she found out that she gained 9 pounds in the last month  She states that she also had a higher blood pressure reading  She states that she will be seeing her PCP next Monday to discuss these symptoms  She expressed some anxiety about the situation-- "I feel like something is wrong " She states she just had her botox injections this past Wednesday  She is hoping that they help to alleviate her migraines again   She states that the injections are painful, but they are typically effective for about three months  Germania Zamarripa discussed dating  She states that she has been seeing a person she met through an online dating mallika  She states that he is "too nice" and she does not see herself having a lasting relationship with him  We discussed what the term "too nice" means to her  She states that she is distrustful of men who are "too nice " As we continued the discussion, she identified that he is not respectful of her boundaries  She states, "He already told me that he wants to  me " We discussed her past relationship, and her desire to take relationships very slowly  She states, "I don't think I'm ready for a relationship "     ASSESSMENT: Germania Zamarripa presents with a somewhat anxious mood  Her affect is mood-congruent  Germania Zamarripa exhibits a positive therapeutic rapport with this clinician  Germania Zamarripa appears to have normal insight and judgment  Germania Zamarripa continues to exhibit willingness to work on treatment goals and objectives  PLAN: Germania Zamarripa will return in two weeks for the next scheduled session  Between sessions, Germania Zamarripa will continue to utilize her mindfulness-based strategies to manage her moods  She will also work on developing a list of her boundaries/limits regarding relationships and will report back during the next session re: successes and barriers  At the next session, this clinician will use client-centered therapy, mindfulness-based strategies, DBT-informed skills, Motivational Interviewing and solution-focused therapy to address her mood regulation and weight-loss efforts, in an effort to assist Germania Zamarripa with meeting treatment goals           HPI     Past Medical History:   Diagnosis Date    Abnormal Pap smear of cervix     Anxiety     Arthritis     Depression     Fibromyalgia, primary     Migraine        Past Surgical History:   Procedure Laterality Date    COLONOSCOPY N/A 5/8/2018    Procedure: COLONOSCOPY;  Surgeon: Dylan Desai MD;  Location: [facility]; Service: Gastroenterology    TONSILLECTOMY      WISDOM TOOTH EXTRACTION         Current Outpatient Medications   Medication Sig Dispense Refill    ARIPiprazole (ABILIFY) 2 mg tablet Take 1 tablet (2 mg total) by mouth daily 30 tablet 2    B-D 3CC LUER-DEANN SYR 25GX1" 25G X 1" 3 ML MISC       buPROPion (WELLBUTRIN XL) 150 mg 24 hr tablet Take 1 tablet (150 mg total) by mouth daily 30 tablet 2    Cholecalciferol (VITAMIN D3) 57172 units CAPS Take by mouth once a week      Erenumab-aooe 140 MG/ML SOAJ Inject 140 mg under the skin every 30 (thirty) days 1 pen 11    escitalopram (LEXAPRO) 10 mg tablet Take 1 tablet (10 mg total) by mouth daily 30 tablet 2    etonogestrel-ethinyl estradiol (NuvaRing) 0 12-0 015 MG/24HR vaginal ring Insert ring for 21 days then remove for one week   3 each 3    ketorolac (TORADOL) 60 mg/2 mL Inject 1-2 mL (30-60 mg total) into a muscle every 6 (six) hours as needed for moderate pain 6 mL 1    melatonin 1 mg 1-2 tabs qhs prn insomnia 60 tablet 2    naproxen (NAPROSYN) 500 mg tablet Take 1 tablet (500 mg total) by mouth as needed for headaches 60 tablet 1    onabotulinumtoxin A (BOTOX) 100 units Inject as directed      phentermine (ADIPEX-P) 37 5 MG tablet Take 1 tablet (37 5 mg total) by mouth daily 30 tablet 1    pregabalin (LYRICA) 25 mg capsule Take 1 capsule (25 mg total) by mouth 2 (two) times a day 60 capsule 2    prochlorperazine (COMPAZINE) 10 mg tablet Take 1 tablet (10 mg total) by mouth every 6 (six) hours as needed for nausea or vomiting 30 tablet 0    SUMAtriptan (IMITREX) 100 mg tablet Take 1 tablet (100 mg total) by mouth once as needed for migraine for up to 1 dose 9 tablet 2    Syringe/Needle, Disp, (SYRINGE 3CC/36XW1-3/4") 27G X 1-1/4" 3 ML MISC Use for IM injection of ketorolac  4 each 0     Current Facility-Administered Medications   Medication Dose Route Frequency Provider Last Rate Last Dose    cyanocobalamin injection 1,000 mcg  1,000 mcg Intramuscular Q30 Days Ketty Stamp, DO   1,000 mcg at 07/01/20 8577    cyanocobalamin injection 1,000 mcg  1,000 mcg Intramuscular Q14 Days Ketty Stamp, DO   1,000 mcg at 05/18/20 1146        Allergies   Allergen Reactions    Depakote Er  [Valproic Acid]      Other reaction(s): dilated pupils, "schizi"    Desvenlafaxine      Other reaction(s): state of confusion       Review of Systems    Video Exam    There were no vitals filed for this visit  Physical Exam     I spent 50 minutes directly with the patient during this visit      VIRTUAL VISIT [address] acknowledges that she has consented to an online visit or consultation  She understands that the online visit is based solely on information provided by her, and that, in the absence of a face-to-face physical evaluation by the physician, the diagnosis she receives is both limited and provisional in terms of accuracy and completeness  This is not intended to replace a full medical face-to-face evaluation by the physician  Amauri Mohan understands and accepts these terms

## 2020-07-13 ENCOUNTER — OFFICE VISIT (OUTPATIENT)
Dept: FAMILY MEDICINE CLINIC | Facility: CLINIC | Age: 30
End: 2020-07-13
Payer: COMMERCIAL

## 2020-07-13 ENCOUNTER — TELEPHONE (OUTPATIENT)
Dept: OBGYN CLINIC | Facility: MEDICAL CENTER | Age: 30
End: 2020-07-13

## 2020-07-13 VITALS
OXYGEN SATURATION: 99 % | HEIGHT: 63 IN | SYSTOLIC BLOOD PRESSURE: 116 MMHG | DIASTOLIC BLOOD PRESSURE: 80 MMHG | WEIGHT: 186.8 LBS | TEMPERATURE: 99.4 F | BODY MASS INDEX: 33.1 KG/M2 | HEART RATE: 96 BPM

## 2020-07-13 DIAGNOSIS — R03.0 ELEVATED BLOOD PRESSURE READING: Primary | ICD-10-CM

## 2020-07-13 PROCEDURE — 99213 OFFICE O/P EST LOW 20 MIN: CPT | Performed by: FAMILY MEDICINE

## 2020-07-13 PROCEDURE — 3008F BODY MASS INDEX DOCD: CPT | Performed by: FAMILY MEDICINE

## 2020-07-13 PROCEDURE — 1036F TOBACCO NON-USER: CPT | Performed by: FAMILY MEDICINE

## 2020-07-13 PROCEDURE — 3008F BODY MASS INDEX DOCD: CPT | Performed by: PSYCHIATRY & NEUROLOGY

## 2020-07-13 NOTE — PROGRESS NOTES
Assessment/Plan: patient will try to lose weight, have low-salt diet along with exercise  Patient will check blood pressures at home and record via log  Will follow at this time  Elevated blood pressure reading was in the morning which could be related to phentermine  Other meds reviewed  Patient will follow-up in 1-2 months per routine       Diagnoses and all orders for this visit:    Elevated blood pressure reading            Subjective:        Patient ID: Chema Mejia is a 35 y o  female  Patient is here due to elevated blood pressure reading at gynecologist and at Botox visit  No new headache or blurred vision or chest pain or shortness of breath or problems with urination or defecation  Patient is concerned about gaining weight  The following portions of the patient's history were reviewed and updated as appropriate: allergies, current medications, past family history, past medical history, past social history, past surgical history and problem list       Review of Systems   Constitutional: Negative  HENT: Negative  Eyes: Negative  Respiratory: Negative  Cardiovascular: Negative  Gastrointestinal: Negative  Endocrine: Negative  Genitourinary: Negative  Musculoskeletal: Negative  Skin: Negative  Allergic/Immunologic: Negative  Neurological: Negative  Hematological: Negative  Psychiatric/Behavioral: Negative  Objective:               /80 (BP Location: Left arm, Patient Position: Sitting, Cuff Size: Adult)   Pulse 96   Temp 99 4 °F (37 4 °C) (Tympanic)   Ht 5' 3" (1 6 m)   Wt 84 7 kg (186 lb 12 8 oz)   LMP 06/23/2020 (LMP Unknown)   SpO2 99%   BMI 33 09 kg/m²          Physical Exam   Constitutional: She appears well-developed and well-nourished  No distress  HENT:   Head: Normocephalic  Right Ear: External ear normal    Left Ear: External ear normal    Eyes: Pupils are equal, round, and reactive to light   EOM are normal  Right eye exhibits no discharge  Left eye exhibits no discharge  No scleral icterus  Neck: Normal range of motion  Neck supple  No thyromegaly present  Cardiovascular: Normal rate, regular rhythm, normal heart sounds and intact distal pulses  Exam reveals no gallop and no friction rub  No murmur heard  Pulmonary/Chest: Effort normal and breath sounds normal  No respiratory distress  She has no wheezes  She has no rales  She exhibits no tenderness  Abdominal: Soft  Bowel sounds are normal  She exhibits no distension  There is no tenderness  There is no rebound and no guarding  Musculoskeletal: Normal range of motion  She exhibits no edema, tenderness or deformity  Lymphadenopathy:     She has no cervical adenopathy  Neurological: She is alert  No cranial nerve deficit  She exhibits normal muscle tone  Coordination normal    Skin: Skin is warm and dry  No rash noted  She is not diaphoretic  No erythema  No pallor  Psychiatric: She has a normal mood and affect  Her behavior is normal  Judgment and thought content normal    Nursing note and vitals reviewed

## 2020-07-17 ENCOUNTER — TELEMEDICINE (OUTPATIENT)
Dept: BEHAVIORAL/MENTAL HEALTH CLINIC | Facility: CLINIC | Age: 30
End: 2020-07-17
Payer: COMMERCIAL

## 2020-07-17 DIAGNOSIS — F33.1 DEPRESSION, MAJOR, RECURRENT, MODERATE (HCC): Primary | Chronic | ICD-10-CM

## 2020-07-17 DIAGNOSIS — F41.1 GENERALIZED ANXIETY DISORDER: Chronic | ICD-10-CM

## 2020-07-17 PROCEDURE — 90834 PSYTX W PT 45 MINUTES: CPT | Performed by: SOCIAL WORKER

## 2020-07-17 NOTE — PSYCH
Virtual Regular Visit      Assessment/Plan:    Problem List Items Addressed This Visit        Other    Generalized anxiety disorder (Chronic)    Depression, major, recurrent, moderate ([facility] [address] ) - Primary (Chronic)               Reason for visit is Behavioral Health session, conducted through video, due to COVID-19 precautions       Encounter provider APARNA Chavez    Provider located at [address] [place] [address]      Recent Visits  Date Type Provider Dept   07/13/20 [facility] Visit [facility], [facility]   07/10/20 [address], [address] [facility] Therapist   Showing recent visits within past 7 days and meeting all other requirements     Future Appointments  No visits were found meeting these conditions  Showing future appointments within next 150 days and meeting all other requirements        The patient was identified by name and date of birth  Kaden Holman was informed that this is a telemedicine visit and that the visit is being conducted through [employer]  My office door was closed  No one else was in the room  She acknowledged consent and understanding of privacy and security of the video platform  The patient has agreed to participate and understands they can discontinue the visit at any time  Patient is aware this is a billable service  Anatoly Raines is a 35 y o  female  DATA: Met with Stephanie for scheduled individual session  "I got a test result, and I'm really upset " Stephanie spent significant time today discussing her positive HPV test  She is scheduled for a colposcopy on August 5th  She has a lot of questions about the risks to others, and she is also expressing sadness and fear that no one will want to be with her  We spent some time talking about sexual health   This clinician encouraged her to write a list of her questions for her physician regarding her diagnosis and the implications  Konstantin Bobby also states that she has "a spending problem " She reports that she has been spending money to make herself feel better  She states that this has been a problem for her in the past  She would like to find ways to eliminate this problem behavior  We discussed the use of mindfulness-based practices, as well as the reasons she is spending as a way of coping with stress  Konstantin Bobby states that she has been spending money on makeup,  because it makes her feel physically beautiful--even if it does not improve her feelings of self-worth  We discussed the use of a barrier (e g , giving her mother her money and debit card) to help her be more mindful before spending money  Konstantin Bobby discussed the relationship she is in  She states that she has talked with him about her need for alone-time, especially when she is feeling sad and dealing with issues in her life  She states that she has been very honest with him about not wanting to be in a serious relationship  She states that he has not been respectful of her limits and boundaries  She is considering ending their relationship if he is not able to be more respectful of her boundaries  ASSESSMENT: Konstantin Bobby presents with a primarily anxious and dysthymic mood  Her affect is normal and mood-congruent  Konstantin Bobby exhibits a positive therapeutic rapport  with this clinician  Konstantin Bobby appears to have normal insight and judgment  Konstantin Bobby continues to exhibit willingness to work on treatment goals and objectives  PLAN: Konstantin Bobby will return in one week for the next scheduled session  Between sessions, Konstantin Bobby will utilize distress tolerance and distraction skills to manage her moods  She will also work on maintaining her limits and boundaries in her relationships and will report back during the next session re: successes and barriers   At the next session, this clinician will use client-centered therapy, mindfulness-based strategies, DBT-informed skills and solution-focused therapy to address her mood regulation and relationships, in an effort to assist Alvin Marquis with meeting treatment goals  HPI     Past Medical History:   Diagnosis Date    Abnormal Pap smear of cervix     Anxiety     Arthritis     Depression     Fibromyalgia, primary     Migraine        Past Surgical History:   Procedure Laterality Date    COLONOSCOPY N/A 5/8/2018    Procedure: COLONOSCOPY;  Surgeon: Sylvain Boateng MD;  Location: [facility]; Service: Gastroenterology    TONSILLECTOMY      WISDOM TOOTH EXTRACTION         Current Outpatient Medications   Medication Sig Dispense Refill    ARIPiprazole (ABILIFY) 2 mg tablet Take 1 tablet (2 mg total) by mouth daily 30 tablet 2    B-D 3CC LUER-DEANN SYR 25GX1" 25G X 1" 3 ML MISC       buPROPion (WELLBUTRIN XL) 150 mg 24 hr tablet Take 1 tablet (150 mg total) by mouth daily 30 tablet 2    Cholecalciferol (VITAMIN D3) 18021 units CAPS Take by mouth once a week      Erenumab-aooe 140 MG/ML SOAJ Inject 140 mg under the skin every 30 (thirty) days 1 pen 11    escitalopram (LEXAPRO) 10 mg tablet Take 1 tablet (10 mg total) by mouth daily 30 tablet 2    etonogestrel-ethinyl estradiol (NuvaRing) 0 12-0 015 MG/24HR vaginal ring Insert ring for 21 days then remove for one week   3 each 3    ketorolac (TORADOL) 60 mg/2 mL Inject 1-2 mL (30-60 mg total) into a muscle every 6 (six) hours as needed for moderate pain 6 mL 1    melatonin 1 mg 1-2 tabs qhs prn insomnia 60 tablet 2    naproxen (NAPROSYN) 500 mg tablet Take 1 tablet (500 mg total) by mouth as needed for headaches 60 tablet 1    onabotulinumtoxin A (BOTOX) 100 units Inject as directed      phentermine (ADIPEX-P) 37 5 MG tablet Take 1 tablet (37 5 mg total) by mouth daily 30 tablet 1    pregabalin (LYRICA) 25 mg capsule Take 1 capsule (25 mg total) by mouth 2 (two) times a day 60 capsule 2    prochlorperazine (COMPAZINE) 10 mg tablet Take 1 tablet (10 mg total) by mouth every 6 (six) hours as needed for nausea or vomiting 30 tablet 0    SUMAtriptan (IMITREX) 100 mg tablet Take 1 tablet (100 mg total) by mouth once as needed for migraine for up to 1 dose 9 tablet 2    Syringe/Needle, Disp, (SYRINGE 3CC/78BC9-4/4") 27G X 1-1/4" 3 ML MISC Use for IM injection of ketorolac  4 each 0     Current Facility-Administered Medications   Medication Dose Route Frequency Provider Last Rate Last Dose    cyanocobalamin injection 1,000 mcg  1,000 mcg Intramuscular Q30 Days Viancatracy Nails, DO   1,000 mcg at 07/01/20 0855    cyanocobalamin injection 1,000 mcg  1,000 mcg Intramuscular Q14 Days Vianca Nails, DO   1,000 mcg at 05/18/20 1146        Allergies   Allergen Reactions    Depakote Er  [Valproic Acid]      Other reaction(s): dilated pupils, "schizi"    Desvenlafaxine      Other reaction(s): state of confusion       Review of Systems    Video Exam    There were no vitals filed for this visit  Physical Exam     I spent 50  minutes directly with the patient during this visit      VIRTUAL VISIT [address] acknowledges that she has consented to an online visit or consultation  She understands that the online visit is based solely on information provided by her, and that, in the absence of a face-to-face physical evaluation by the physician, the diagnosis she receives is both limited and provisional in terms of accuracy and completeness  This is not intended to replace a full medical face-to-face evaluation by the physician  Freda Mo understands and accepts these terms

## 2020-07-20 ENCOUNTER — CLINICAL SUPPORT (OUTPATIENT)
Dept: NUTRITION | Facility: HOSPITAL | Age: 30
End: 2020-07-20
Payer: COMMERCIAL

## 2020-07-20 VITALS — HEIGHT: 63 IN | BODY MASS INDEX: 33.22 KG/M2 | WEIGHT: 187.5 LBS

## 2020-07-20 DIAGNOSIS — E66.9 OBESITY (BMI 30-39.9): ICD-10-CM

## 2020-07-20 PROCEDURE — S9470 NUTRITIONAL COUNSELING, DIET: HCPCS

## 2020-07-20 NOTE — PROGRESS NOTES
Initial Nutrition Assessment Form    Patient Name: Sangeetha Lynn    YOB: 1990    Sex: Female     Assessment Date: 7/20/2020  Start Time: 10:00am Stop Time: 11:00am Total Minutes: 60min     Data:  Present at session: Self   Parent/Patient Concerns: " I have a problem under eating and I do not like a lot of food to maintain weight"   Medical Dx/Reason for Referral: E66 9 Obesity (BMI 30-39  9)   Past Medical History:   Diagnosis Date    Abnormal Pap smear of cervix     Anxiety     Arthritis     Depression     Fibromyalgia, primary     Migraine     Phentermine started Feb/March 2020   Current Outpatient Medications   Medication Sig Dispense Refill    ARIPiprazole (ABILIFY) 2 mg tablet Take 1 tablet (2 mg total) by mouth daily 30 tablet 2    B-D 3CC LUER-DEANN SYR 25GX1" 25G X 1" 3 ML MISC       buPROPion (WELLBUTRIN XL) 150 mg 24 hr tablet Take 1 tablet (150 mg total) by mouth daily 30 tablet 2    Cholecalciferol (VITAMIN D3) 65189 units CAPS Take by mouth once a week      Erenumab-aooe 140 MG/ML SOAJ Inject 140 mg under the skin every 30 (thirty) days 1 pen 11    escitalopram (LEXAPRO) 10 mg tablet Take 1 tablet (10 mg total) by mouth daily 30 tablet 2    etonogestrel-ethinyl estradiol (NuvaRing) 0 12-0 015 MG/24HR vaginal ring Insert ring for 21 days then remove for one week   3 each 3    ketorolac (TORADOL) 60 mg/2 mL Inject 1-2 mL (30-60 mg total) into a muscle every 6 (six) hours as needed for moderate pain 6 mL 1    melatonin 1 mg 1-2 tabs qhs prn insomnia 60 tablet 2    naproxen (NAPROSYN) 500 mg tablet Take 1 tablet (500 mg total) by mouth as needed for headaches 60 tablet 1    onabotulinumtoxin A (BOTOX) 100 units Inject as directed      phentermine (ADIPEX-P) 37 5 MG tablet Take 1 tablet (37 5 mg total) by mouth daily 30 tablet 1    pregabalin (LYRICA) 25 mg capsule Take 1 capsule (25 mg total) by mouth 2 (two) times a day 60 capsule 2    prochlorperazine (COMPAZINE) 10 mg tablet Take 1 tablet (10 mg total) by mouth every 6 (six) hours as needed for nausea or vomiting 30 tablet 0    SUMAtriptan (IMITREX) 100 mg tablet Take 1 tablet (100 mg total) by mouth once as needed for migraine for up to 1 dose 9 tablet 2    Syringe/Needle, Disp, (SYRINGE 3CC/98EL4-2/4") 27G X 1-1/4" 3 ML MISC Use for IM injection of ketorolac  4 each 0     Current Facility-Administered Medications   Medication Dose Route Frequency Provider Last Rate Last Dose    cyanocobalamin injection 1,000 mcg  1,000 mcg Intramuscular Q30 Days Vianca Nails, DO   1,000 mcg at 07/01/20 2070    cyanocobalamin injection 1,000 mcg  1,000 mcg Intramuscular Q14 Days Vianca Nails, DO   1,000 mcg at 05/18/20 1146        Additional Meds/Supplements: Reviewed, states she is taking Vitamin D   Special Learning Needs: None   Height: Height Measured   Weight: Wt Readings from Last 10 Encounters:   07/13/20 84 7 kg (186 lb 12 8 oz)   07/08/20 83 6 kg (184 lb 3 2 oz)   07/06/20 83 5 kg (184 lb)   06/02/20 81 kg (178 lb 8 oz)   06/02/20 81 2 kg (179 lb)   05/11/20 80 6 kg (177 lb 12 8 oz)   05/04/20 81 6 kg (180 lb)   03/02/20 80 3 kg (177 lb)   02/24/20 80 6 kg (177 lb 11 2 oz)   01/28/20 77 1 kg (170 lb)        Recent Weight Change: [x]Yes     []No  Amount: Undesirable 17 lb in 6 months despite taking weight loss emdication      Energy Needs: Drayden-   Jorgeor Equation:  1546 x 1 64=6903 kcal-[id number]   Allergies   Allergen Reactions    Depakote Er  [Valproic Acid]      Other reaction(s): dilated pupils, "schizi"    Desvenlafaxine      Other reaction(s): state of confusion       Social History     Substance and Sexual Activity   Alcohol Use No       Social History     Tobacco Use   Smoking Status Never Smoker   Smokeless Tobacco Never Used       Who shops? mother/self   Who cooks? mother/self   Exercise:    Prior Counseling?  [x]Yes     []No  When: "awhile a go"     Why: weight        Diet Hx:  Breakfast: 1 Cups Special K Cereal  4 oz 2% Milk       Lunch: Herbal Shake made with water         Dinner: Chicken crock pot  Rice a Arsen or Potatoes/Corn           Snacks:  Apple x2         Nutrition Diagnosis:   Overweight/obesity  related to Excess energy intake as evidenced by  BMI more than normative standard for age and sex (obesity-grade I 30-34  9)       Medical Nutrition Therapy Intervention:  [x]Individualized Meal Plan-1300-1400 kcal, 80 grm Protein []Understanding Lab Values   [x]Basic Pathophysiology of Disease-Obesity []Food/Medication Interactions   [x]Food Diary-myfitnesspal [x]Exercise- >150min /week recommended    [x]Lifestyle/Behavior Modification Techniques-need to adjust dietary patterns in regards to consistency of nutrition plan []Medication, Mechanism of Action   [x]Label Reading-Serv Size/Fiber/Protein []Self Blood Glucose Monitoring   [x]Weight/BMI Goals-1-2 lb weight loss/week until first goal of 10%( 168 lb) in 3 months (Oct 20,2020)  Personal Goal-pending  RD Goal BMI 24 8 (140lb) [x]Other - Myplate Method reviewed and provided for Home Use   Other Notes: Food recall suspected under reporting  Per session with patient suspect inconsistency with caloric restriction resulting in weight gain and not goal of weight loss  Patient does admit to this  Stephanie agreed to goals and plan  Her insurance will not cover for  Nutrition visits  Provided contact card for follow up questions/concerns  Comprehension: []Excellent  []Very Good  [x]Good  []Fair   []Poor    Receptivity: []Excellent  []Very Good  [x]Good  []Fair   []Poor    Expected Compliance: []Excellent  []Very Good  [x]Good  []Fair   []Poor        Goals:  1 1-2 lb weight loss/week until first goal of 10%( 168 lb) in 3 months (Oct 20,2020)  Personal Goal-pending RD Goal BMI 24 8 (140lb)  2 Stephanie will report 2-3 healthy food/beahbvior changes by next encounter     3  Paulo Augustin will have improved blood pressure while consuming < or = 2000 mg Sodium         Labs:  CMP  Lab Results   Component Value Date     03/23/2014    K 3 8 01/28/2020     01/28/2020    CO2 22 01/28/2020    ANIONGAP 4 03/23/2014    BUN 8 01/28/2020    CREATININE 0 63 01/28/2020    GLUCOSE 88 03/23/2014    CALCIUM 9 2 01/28/2020    AST 19 02/05/2018    ALT 20 02/05/2018    ALKPHOS 50 02/05/2018    PROT 7 7 03/23/2014    BILITOT 0 4 03/23/2014    EGFR 122 01/28/2020       BMP  Lab Results   Component Value Date    GLUCOSE 88 03/23/2014    CALCIUM 9 2 01/28/2020     03/23/2014    K 3 8 01/28/2020    CO2 22 01/28/2020     01/28/2020    BUN 8 01/28/2020    CREATININE 0 63 01/28/2020       Lipids  Lab Results   Component Value Date    CHOL 300 (H) 03/17/2014     Lab Results   Component Value Date    HDL 93 03/17/2014     No results found for: [facility]  Lab Results   Component Value Date    TRIG 197 (H) 03/17/2014     No results found for: CHOLHDL    Hemoglobin A1C  No results found for: HGBA1C    Fasting Glucose  No results found for: GLUF    Insulin     Thyroid  No results found for: TSH, L0KXSWY, H0YCVGB, THYROIDAB    Hepatic Function Panel  Lab Results   Component Value Date    ALT 20 02/05/2018    AST 19 02/05/2018    ALKPHOS 50 02/05/2018    BILITOT 0 4 03/23/2014       Celiac Disease Antibody Panel  No results found for: ENDOMYSIAL IGA, GLIADIN IGA, GLIADIN IGG, IGA, TISSUE TRANSGLUT AB, TTG IGA   Iron  No results found for: IRON, TIBC, FERRITIN    Vitamins  No results found for: VITAMIN B2   No results found for: NICOTINAMIDE, NICOTINIC ACID   No results found for: VITAMINB6  No results found for: QFGMABWQ14  No results found for: VITB5  No results found for: W5BWWPGB  No results found for: THYROGLB  No results found for: VITAMIN K   No results found for: 25-HYDROXY VIT D   No components found for: [facility]  [address] [address]

## 2020-07-24 ENCOUNTER — TELEMEDICINE (OUTPATIENT)
Dept: BEHAVIORAL/MENTAL HEALTH CLINIC | Facility: CLINIC | Age: 30
End: 2020-07-24
Payer: COMMERCIAL

## 2020-07-24 DIAGNOSIS — F33.1 DEPRESSION, MAJOR, RECURRENT, MODERATE (HCC): Primary | Chronic | ICD-10-CM

## 2020-07-24 DIAGNOSIS — F41.1 GENERALIZED ANXIETY DISORDER: Chronic | ICD-10-CM

## 2020-07-24 PROCEDURE — 90834 PSYTX W PT 45 MINUTES: CPT | Performed by: SOCIAL WORKER

## 2020-07-24 NOTE — PSYCH
Virtual Regular Visit      Assessment/Plan:    Problem List Items Addressed This Visit        Other    Generalized anxiety disorder (Chronic)    Depression, major, recurrent, moderate ([facility] [address] ) - Primary (Chronic)               Reason for visit is Behavioral Health session, conducted through video, due to COVID-19 precautions       Encounter provider APARNA Garcia    Provider located at [address] [place] [address]      Recent Visits  Date Type Provider Dept   07/17/20 [address], [address] [facility]   Showing recent visits within past 7 days and meeting all other requirements     Future Appointments  No visits were found meeting these conditions  Showing future appointments within next 150 days and meeting all other requirements        The patient was identified by name and date of birth  Cody Singhs was informed that this is a telemedicine visit and that the visit is being conducted through iogyn  My office door was closed  No one else was in the room  She acknowledged consent and understanding of privacy and security of the video platform  The patient has agreed to participate and understands they can discontinue the visit at any time  Patient is aware this is a billable service  Willow Maria is a 35 y o  female  DATA: Met with Stephanie for scheduled individual session  "I'm having a hard time accepting my diagnosis of HPV  We discussed the fact that this is a very new diagnosis, and this clinician worked to normalize her feelings  "I sometimes deny my fibro diagnosis " We spent some time discussing the issues that lead to those feelings  She states that she compares herself with other people who are worse, and she feels guilty for feeling like she is disabled by her condition   She states that "on good days, I think I don't have it " She states that she continues to work on getting SSA, and she wants this only as a stepping stone and not as a long-term solution  She states that she is working on developing more techniques to help manage her pain; e g , using mindfulness, movement, and THC to assist with pain management  Camilo Cornejo discussed her efforts to improve her nutrition and lose weight  She states that she ewnt to dietician  She was upset by the interaction and felt like the dietician did not listen to her  She states that the dietician was focusing on her "limiteing her food intake" rather than giving her suggestions about what foods to eat  She states that she was hoping to get information on how her diet can have an impact on her pain levels  She states she has not been doing a lot of exercise recently, due to her pain  We discussed some solution-focused ways of managing exercise--e g , walking in the pool to manage the heat  She agreed to give it a try  ASSESSMENT: Camilo Cornejo presents with a somewhat dysthymic mood  Her affect is normal and mood-congruent  Camilo Cornejo exhibits a positive therapeutic rapport with this clinician  Camilo Cornejo appears to have normal insight and judgment  Camilo Cornejo continues to exhibit willingness to work on treatment goals and objectives  PLAN: Camilo Cornejo will return in two weeks for the next scheduled session  Between sessions, Camilo Cornejo will continue to increase her use of movement and mindfulness to manage her pain and her moods  She will report back during the next session re: successes and barriers  At the next session, this clinician will use client-centered therapy, mindfulness-based strategies, DBT-informed skills, Motivational Interviewing and solution-focused therapy to address her mood regulation and overspending, in an effort to assist Camilo Cornejo with meeting treatment goals           HPI     Past Medical History:   Diagnosis Date    Abnormal Pap smear of cervix     Anxiety     Arthritis     Depression     Fibromyalgia, primary     Migraine        Past Surgical History:   Procedure Laterality Date    COLONOSCOPY N/A 5/8/2018    Procedure: COLONOSCOPY;  Surgeon: Ramesh Díaz MD;  Location: [facility] GI [facility]; Service: Gastroenterology    TONSILLECTOMY      WISDOM TOOTH EXTRACTION         Current Outpatient Medications   Medication Sig Dispense Refill    ARIPiprazole (ABILIFY) 2 mg tablet Take 1 tablet (2 mg total) by mouth daily 30 tablet 2    B-D 3CC LUER-DEANN SYR 25GX1" 25G X 1" 3 ML MISC       buPROPion (WELLBUTRIN XL) 150 mg 24 hr tablet Take 1 tablet (150 mg total) by mouth daily 30 tablet 2    Cholecalciferol (VITAMIN D3) 81853 units CAPS Take by mouth once a week      Erenumab-aooe 140 MG/ML SOAJ Inject 140 mg under the skin every 30 (thirty) days 1 pen 11    escitalopram (LEXAPRO) 10 mg tablet Take 1 tablet (10 mg total) by mouth daily 30 tablet 2    etonogestrel-ethinyl estradiol (NuvaRing) 0 12-0 015 MG/24HR vaginal ring Insert ring for 21 days then remove for one week  3 each 3    ketorolac (TORADOL) 60 mg/2 mL Inject 1-2 mL (30-60 mg total) into a muscle every 6 (six) hours as needed for moderate pain 6 mL 1    melatonin 1 mg 1-2 tabs qhs prn insomnia 60 tablet 2    naproxen (NAPROSYN) 500 mg tablet Take 1 tablet (500 mg total) by mouth as needed for headaches 60 tablet 1    onabotulinumtoxin A (BOTOX) 100 units Inject as directed      phentermine (ADIPEX-P) 37 5 MG tablet Take 1 tablet (37 5 mg total) by mouth daily 30 tablet 1    pregabalin (LYRICA) 25 mg capsule Take 1 capsule (25 mg total) by mouth 2 (two) times a day 60 capsule 2    prochlorperazine (COMPAZINE) 10 mg tablet Take 1 tablet (10 mg total) by mouth every 6 (six) hours as needed for nausea or vomiting 30 tablet 0    SUMAtriptan (IMITREX) 100 mg tablet Take 1 tablet (100 mg total) by mouth once as needed for migraine for up to 1 dose 9 tablet 2    Syringe/Needle, Disp, (SYRINGE 3CC/46YR6-2/4") 27G X 1-1/4" 3 ML MISC Use for IM injection of ketorolac  4 each 0     Current Facility-Administered Medications   Medication Dose Route Frequency Provider Last Rate Last Dose    cyanocobalamin injection 1,000 mcg  1,000 mcg Intramuscular Q30 Days Margurite Davis, DO   1,000 mcg at 07/01/20 3720    cyanocobalamin injection 1,000 mcg  1,000 mcg Intramuscular Q14 Days Margurite Davis, DO   1,000 mcg at 05/18/20 1146        Allergies   Allergen Reactions    Depakote Er  [Valproic Acid]      Other reaction(s): dilated pupils, "schizi"    Desvenlafaxine      Other reaction(s): state of confusion       Review of Systems    Video Exam    There were no vitals filed for this visit  Physical Exam     I spent 45 minutes directly with the patient during this visit      VIRTUAL VISIT [address] acknowledges that she has consented to an online visit or consultation  She understands that the online visit is based solely on information provided by her, and that, in the absence of a face-to-face physical evaluation by the physician, the diagnosis she receives is both limited and provisional in terms of accuracy and completeness  This is not intended to replace a full medical face-to-face evaluation by the physician  Bartolo Becker understands and accepts these terms

## 2020-07-27 ENCOUNTER — OFFICE VISIT (OUTPATIENT)
Dept: URGENT CARE | Facility: CLINIC | Age: 30
End: 2020-07-27
Payer: COMMERCIAL

## 2020-07-27 VITALS
SYSTOLIC BLOOD PRESSURE: 150 MMHG | DIASTOLIC BLOOD PRESSURE: 90 MMHG | HEIGHT: 63 IN | HEART RATE: 88 BPM | OXYGEN SATURATION: 99 % | BODY MASS INDEX: 33.31 KG/M2 | RESPIRATION RATE: 20 BRPM | WEIGHT: 188 LBS | TEMPERATURE: 97.8 F

## 2020-07-27 DIAGNOSIS — R51.9 ACUTE NONINTRACTABLE HEADACHE, UNSPECIFIED HEADACHE TYPE: Primary | ICD-10-CM

## 2020-07-27 PROCEDURE — 99284 EMERGENCY DEPT VISIT MOD MDM: CPT | Performed by: PHYSICIAN ASSISTANT

## 2020-07-27 PROCEDURE — 99204 OFFICE O/P NEW MOD 45 MIN: CPT | Performed by: PHYSICIAN ASSISTANT

## 2020-07-27 PROCEDURE — G0383 LEV 4 HOSP TYPE B ED VISIT: HCPCS | Performed by: PHYSICIAN ASSISTANT

## 2020-07-27 PROCEDURE — 96372 THER/PROPH/DIAG INJ SC/IM: CPT | Performed by: PHYSICIAN ASSISTANT

## 2020-07-27 RX ORDER — KETOROLAC TROMETHAMINE 30 MG/ML
60 INJECTION, SOLUTION INTRAMUSCULAR; INTRAVENOUS ONCE
Status: COMPLETED | OUTPATIENT
Start: 2020-07-27 | End: 2020-07-27

## 2020-07-27 RX ADMIN — KETOROLAC TROMETHAMINE 60 MG: 30 INJECTION, SOLUTION INTRAMUSCULAR; INTRAVENOUS at 20:23

## 2020-08-03 ENCOUNTER — CLINICAL SUPPORT (OUTPATIENT)
Dept: FAMILY MEDICINE CLINIC | Facility: CLINIC | Age: 30
End: 2020-08-03
Payer: COMMERCIAL

## 2020-08-03 DIAGNOSIS — E53.8 B12 DEFICIENCY: Primary | ICD-10-CM

## 2020-08-03 RX ADMIN — CYANOCOBALAMIN 1000 MCG: 1000 INJECTION INTRAMUSCULAR; SUBCUTANEOUS at 08:59

## 2020-08-03 RX ADMIN — CYANOCOBALAMIN 1000 MCG: 1000 INJECTION INTRAMUSCULAR; SUBCUTANEOUS at 08:52

## 2020-08-05 ENCOUNTER — APPOINTMENT (OUTPATIENT)
Dept: LAB | Facility: HOSPITAL | Age: 30
End: 2020-08-05
Payer: COMMERCIAL

## 2020-08-05 ENCOUNTER — PROCEDURE VISIT (OUTPATIENT)
Dept: OBGYN CLINIC | Facility: MEDICAL CENTER | Age: 30
End: 2020-08-05
Payer: COMMERCIAL

## 2020-08-05 VITALS — WEIGHT: 190.7 LBS | BODY MASS INDEX: 33.78 KG/M2

## 2020-08-05 DIAGNOSIS — Z76.89 ENCOUNTER FOR BIOPSY: ICD-10-CM

## 2020-08-05 DIAGNOSIS — M79.7 FIBROMYALGIA: ICD-10-CM

## 2020-08-05 DIAGNOSIS — R87.810 ASCUS WITH POSITIVE HIGH RISK HPV CERVICAL: Primary | ICD-10-CM

## 2020-08-05 DIAGNOSIS — R87.610 ASCUS WITH POSITIVE HIGH RISK HPV CERVICAL: Primary | ICD-10-CM

## 2020-08-05 LAB — SL AMB POCT URINE HCG: NEGATIVE

## 2020-08-05 PROCEDURE — 88305 TISSUE EXAM BY PATHOLOGIST: CPT | Performed by: PATHOLOGY

## 2020-08-05 PROCEDURE — 57454 BX/CURETT OF CERVIX W/SCOPE: CPT | Performed by: OBSTETRICS & GYNECOLOGY

## 2020-08-05 PROCEDURE — 81025 URINE PREGNANCY TEST: CPT | Performed by: OBSTETRICS & GYNECOLOGY

## 2020-08-05 RX ORDER — ARIPIPRAZOLE 2 MG/1
2 TABLET ORAL DAILY
Qty: 30 TABLET | Refills: 2 | Status: SHIPPED | OUTPATIENT
Start: 2020-08-05 | End: 2020-11-23 | Stop reason: SDUPTHER

## 2020-08-05 NOTE — PATIENT INSTRUCTIONS
Colposcopy   WHAT YOU NEED TO KNOW:   A colposcopy is a procedure to look for abnormal cells in your cervix and vagina  Your healthcare provider will use a colposcope, which is a small scope with a light on it  DISCHARGE INSTRUCTIONS:   Medicines:   · Pain medicine: You may be given medicine to take away or decrease pain  Do not wait until the pain is severe before you take your medicine  · Take your medicine as directed  Call your healthcare provider if you think your medicine is not helping or if you have side effects  Tell him if you are allergic to any medicine  Keep a list of the medicines, vitamins, and herbs you take  Include the amounts, and when and why you take them  Bring the list or the pill bottles to follow-up visits  Carry your medicine list with you in case of an emergency  Follow up with your healthcare provider or gynecologist as directed: You may need to return for more tests or to have abnormal cells removed  Write down your questions so you remember to ask them during your visits  Self-care:  Use a sanitary pad for any light bleeding  Ask when it is okay to use tampons, douche, or have sex  If you are pregnant, do not put anything in your vagina until your healthcare provider says it is okay  Avoid heavy lifting for 24 hours after your procedure, this will decrease your risk of bleeding  Contact your healthcare provider or gynecologist if:   · You have pain that does not go away, even after you take pain medicine  · You have a fever  · You have questions or concerns about your condition or care  Seek care immediately or call 911 if:   · You have bleeding from your vagina that is heavier than your monthly period  © 2016 [address] is for End User's use only and may not be sold, redistributed or otherwise used for commercial purposes   All illustrations and images included in CareNotes® are the copyrighted property of A D [employer] M [employer]  or [employer]  The above information is an  only  It is not intended as medical advice for individual conditions or treatments  Talk to your doctor, nurse or pharmacist before following any medical regimen to see if it is safe and effective for you

## 2020-08-05 NOTE — PROGRESS NOTES
Colposcopy    Date/Time: 8/5/2020 2:47 PM  Performed by: Sangita Johnson MD  Authorized by: Sangita Johnson MD     Consent:     Consent obtained:  Written    Consent given by:  Patient    Procedural risks discussed:  Bleeding, failure rate, repeat procedure and infection    Patient questions answered: yes      Patient agrees, verbalizes understanding, and wants to proceed: yes      Educational handouts given: yes      Instructions and paperwork completed: yes    Pre-procedure:     Pre-procedure timeout performed: yes      Prepped with: acetic acid    Indication:     Indication:  ASC-US  Procedure:     Procedure: Colposcopy w/ cervical biopsy and ECC      Under satisfactory analgesia the patient was prepped and draped in the dorsal lithotomy position: yes      [place] speculum was placed in the vagina: yes      Under colposcopic examination the transition zone was seen in entirety: yes      Endocervix was curetted using a Kevorkian curette: yes      Cervical biopsy performed with a cervical biopsy punch: yes      Monsel's solution was applied: yes      Biopsy(s): yes      Location:  4 and 8 o'clock    Specimen to pathology: yes    Post-procedure:     Findings: White epithelium      Impression: Low grade cervical dysplasia      Patient tolerance of procedure: Tolerated well, no immediate complications  Comments:      Pt given pamphlets on paps and colposcopy  All questions answered

## 2020-08-07 ENCOUNTER — TELEMEDICINE (OUTPATIENT)
Dept: BEHAVIORAL/MENTAL HEALTH CLINIC | Facility: CLINIC | Age: 30
End: 2020-08-07
Payer: COMMERCIAL

## 2020-08-07 DIAGNOSIS — F33.1 DEPRESSION, MAJOR, RECURRENT, MODERATE (HCC): Primary | Chronic | ICD-10-CM

## 2020-08-07 DIAGNOSIS — F41.1 GENERALIZED ANXIETY DISORDER: Chronic | ICD-10-CM

## 2020-08-07 PROCEDURE — 90834 PSYTX W PT 45 MINUTES: CPT | Performed by: SOCIAL WORKER

## 2020-08-07 NOTE — BH TREATMENT PLAN
Chaselatoyaafia Deleonsada  1990         Date of Initial Treatment Plan: June 26, 2019   Date of Current Treatment Plan: August 7, 2020     Treatment Plan Number 4      Strengths/Personal Resources for Self Care: Good support from mom, grandmother, and best friend; willingness to ask questions; good therapeutic rapport; ability to reach out for support    Diagnosis:   1  Depression, major, recurrent, moderate ([address] )      2  Generalized anxiety disorder           Area of Needs: Anxiety and depression; physical health issues        Long Term Goal 1: "I want to be a better me--both emotionally and physically  "     Target Date:             December 6, 2020  Treatment Plan Expiration Date:  February 4, 2021  Completion Date: To be determined         Short Term Objectives for Goal 1:             9  Cruz will identify triggers and prompting events that increase symptoms of anxiety and depression    Update 8/7/2020: Jaylene Duckworth is able to identify some of the triggers and prompting events that impact her mood regulation  She identifies her physical health concerns as a primary stressor for her  Jaylene Duckworth also occasionally identifies relationships (usually romantic and/or intimate relationships) to be a trigger for mood dysregulation  We will continue to identify triggers/prompting events as they occur, as this will inform our ongoing treatment and use of interventions            4  Cruz will learn and exhibit understanding of a minimum of three distress tolerance skills and emotion regulation skills to assist with symptom reduction    Update 8/7/2020: At this point, Jaylene Duckworth has not been practicing her distress tolerance and emotion-regulation skills as much as she has in the past  She has not been participating in Gentle Yoga, mindful meditation, or exercise   She states, "I need to get back to taking care of myself " She identifies that the heat increases her pain level, which decreases her motivation for movement-based exercise              4Lanita Shows will increase her daily physical activity to a duration of 15-30 minutes (including walking, swimming, stretching, etc )    Update 8/7/2020: Oli Acevedo wants to get back to focusing on this objective  The combination of the weather and her fear of COVID-19 have significantly decreased her focus on physical activity  Rachele Rees Shows will increase her social interactions (outside of her home) to a minimum of 3x per week    Update 8/7/2020: Oli Acevedo continues to see her best friend regularly  She occasionally meets other people; however, due to the COVID crisis, she has significantly limited the number of people she is spending time with      Antonia Shay will learn and utilize formal mindfulness-based strategies a minimum of 10 minutes every day    Update 8/7/2020: Oli Acevedo identifies that she is not at goal for this objective  She is interested in revisiting the use of mindfulness-based strategies in her daily life              6  Cruz will maintain a level of anxiety that does not surpass a 4/10 on most days  Incidents that surpass this limit will be process in therapy sessions  Update 8/7/2020: Stephanie rates her current level of anxiety as a 3/10  She identifies that her highest level of anxiety over the past two weeks was a 9/10  This increase in her anxiety was related to a physical health issue she was concerned about  She identifies her current level of depression is a 4/10  Within the past two weeks, she rates her highest level of depression as a 9 10  She states, "I felt like I had no emotions  "             7  Stephanie will maintain adherence with medication management sessions and her medication regimen  Update 8/7/2020: Oli Acevedo reports a strong motivation to maintain adherence with her medication regimen  She identifies only 1 or 2 missed doses of medications over the past two weeks--and she attributes these lapses to her fatigue   She states that she has significantly decreased her use of medical marijuana  She states she has used only 3x in the past two weeks       GOAL 1: Modality: Individual 1-2x per month   Completion Date to be determined, Medication Management and The person(s) responsible for carrying out the plan is  Michelle Orlando (client); Carrington Cordova (clinician); Dr Laura Collins (psychiatrist)     Clinician will use client-centered therapy, mindfulness-based strategies, DBT-informed skills and solution-focused therapy to address Cruz's symptoms of anxiety  Frankie Lipscomb will practice skills between sessions and will report back, during subsequent sessions regarding successes and barriers          [address]: Diagnosis and Treatment Plan explained to Agustín Weaver relates understanding diagnosis and is agreeable to Treatment Plan          Client Comments : Please share your thoughts, feelings, need and/or experiences regarding your treatment plan: "I think we're doing pretty great  I think I would lost without you "    This treatment plan was created between this clinician and this client on 08/07/2020  Due to the current COVID-19 precautions, this treatment plan was created during a Virtual Visit (through the use of [employer])  The client provided verbal consent at the time of the actual session  The treatment plan was transcribed into the Electronic Health Record at a later date

## 2020-08-07 NOTE — PSYCH
Virtual Regular Visit      Assessment/Plan:    Problem List Items Addressed This Visit        Other    Generalized anxiety disorder (Chronic)    Depression, major, recurrent, moderate ([facility] [address] ) - Primary (Chronic)               Reason for visit is Reason for visit is Behavioral Health session, conducted through video, due to COVID-19 precautions  Frankie Lipscomb has verbalized a preference to continue with virtual sessions at this time  Frankie Lipscomb has been offered in-person sessions and has declined  Encounter provider APARNA York    Provider located at [address] [place] [address]  [address]  [facility] [address]      Recent Visits  No visits were found meeting these conditions  Showing recent visits within past 7 days and meeting all other requirements     Future Appointments  No visits were found meeting these conditions  Showing future appointments within next 150 days and meeting all other requirements        The patient was identified by name and date of birth  Jan Mcdonald was informed that this is a telemedicine visit and that the visit is being conducted through [employer]  My office door was closed  No one else was in the room  She acknowledged consent and understanding of privacy and security of the video platform  The patient has agreed to participate and understands they can discontinue the visit at any time  Patient is aware this is a billable service  Yash Jackson is a 35 y o  female  DATA: Met with Stephanie for scheduled individual session  "I feel better  I had a lot of questions, and my doctor answered them " Michelle Darion had her colposcopy and was able to speak with her doctor about her questions regarding HPV  Stephanie spent the majority of this session discussing her physical health issues and her desire to get back into engaging in healthy activities   She states that she has been feeling an increase in mood dysregulation  She states that the recent flare of her physical symptoms have impacted her mood; however, she also identifies that her mood negatively impacts her behaviors--which, in turn, increases her level of pain  We spent some time reviewing and revising Stephanie's treatment plan  We discussed her plans to increase her physical activity, improve her nutrition, and use a mindfulness-based strategy (e g , yoga) to stretch and improve her pain  [place] discussed her friendships and social interactions  She sees her best friend often  She states that she is interested in dating; however, she wants to get back to focusing on herself  She states that she, at times, gets caught up in dating and forgets to care for herself  She states that she spent some time with a new male friend last night  At this time, she is not interested in a long-term relationship; however, she does want to be able to have some positive dating relationships  Stephanie discussed her SSA application  She continues to wait for a determination  If she is declined SSA, she plans to continue with an appeal  She states she does not want to maintain SSA benefits for her life; however, she is currently unable to manage a job that will provide her with a living wage; therefore, she is hoping to get approved at this time  ASSESSMENT: [place] presents with a primarily euthymic mood at the time of the session  Her affect is normal and mood-congruent  [place] exhibits a positive therapeutic rapport with this clinician  [place] appears to have normal insight and judgment  [place] continues to exhibit willingness to work on treatment goals and objectives  PLAN: [place] will return in two weeks for the next scheduled session  Between sessions, [place] will increase her physical activity and mindfulness-based strategies and will report back during the next session re: successes and barriers   At the next session, this clinician will use client-centered therapy, mindfulness-based strategies, DBT-informed skills and solution-focused therapy to address her mood regulation and anxiety management, in an effort to assist Alvin Marquis with meeting treatment goals  HPI     Past Medical History:   Diagnosis Date    Abnormal Pap smear of cervix     Anxiety     Arthritis     Depression     Fibromyalgia, primary     Migraine        Past Surgical History:   Procedure Laterality Date    COLONOSCOPY N/A 5/8/2018    Procedure: COLONOSCOPY;  Surgeon: Perla Rose MD;  Location: [facility] GI [facility]; Service: Gastroenterology    TONSILLECTOMY      WISDOM TOOTH EXTRACTION         Current Outpatient Medications   Medication Sig Dispense Refill    ARIPiprazole (ABILIFY) 2 mg tablet Take 1 tablet (2 mg total) by mouth daily 30 tablet 2    B-D 3CC LUER-DEANN SYR 25GX1" 25G X 1" 3 ML MISC       buPROPion (WELLBUTRIN XL) 150 mg 24 hr tablet Take 1 tablet (150 mg total) by mouth daily 30 tablet 2    Cholecalciferol (VITAMIN D3) 78196 units CAPS Take by mouth once a week      Erenumab-aooe 140 MG/ML SOAJ Inject 140 mg under the skin every 30 (thirty) days 1 pen 11    escitalopram (LEXAPRO) 10 mg tablet Take 1 tablet (10 mg total) by mouth daily 30 tablet 2    etonogestrel-ethinyl estradiol (NuvaRing) 0 12-0 015 MG/24HR vaginal ring Insert ring for 21 days then remove for one week   3 each 3    ketorolac (TORADOL) 60 mg/2 mL Inject 1-2 mL (30-60 mg total) into a muscle every 6 (six) hours as needed for moderate pain (Patient not taking: Reported on 7/27/2020) 6 mL 1    melatonin 1 mg 1-2 tabs qhs prn insomnia 60 tablet 2    naproxen (NAPROSYN) 500 mg tablet Take 1 tablet (500 mg total) by mouth as needed for headaches 60 tablet 1    onabotulinumtoxin A (BOTOX) 100 units Inject as directed      phentermine (ADIPEX-P) 37 5 MG tablet Take 1 tablet (37 5 mg total) by mouth daily (Patient not taking: Reported on 8/5/2020) 30 tablet 1    pregabalin (LYRICA) 25 mg capsule Take 1 capsule (25 mg total) by mouth 2 (two) times a day 60 capsule 2    prochlorperazine (COMPAZINE) 10 mg tablet Take 1 tablet (10 mg total) by mouth every 6 (six) hours as needed for nausea or vomiting 30 tablet 0    SUMAtriptan (IMITREX) 100 mg tablet Take 1 tablet (100 mg total) by mouth once as needed for migraine for up to 1 dose 9 tablet 2    Syringe/Needle, Disp, (SYRINGE 3CC/31UH4-0/4") 27G X 1-1/4" 3 ML MISC Use for IM injection of ketorolac  4 each 0     Current Facility-Administered Medications   Medication Dose Route Frequency Provider Last Rate Last Dose    cyanocobalamin injection 1,000 mcg  1,000 mcg Intramuscular Q30 Days Morena Arroyo, DO   1,000 mcg at 08/03/20 7168    cyanocobalamin injection 1,000 mcg  1,000 mcg Intramuscular Q14 Days Morena Arroyo, DO   1,000 mcg at 05/18/20 1146        Allergies   Allergen Reactions    Depakote Er  [Valproic Acid]      Other reaction(s): dilated pupils, "schizi"    Desvenlafaxine      Other reaction(s): state of confusion       Review of Systems    Video Exam    There were no vitals filed for this visit  Physical Exam     I spent 50 minutes directly with the patient during this visit      VIRTUAL VISIT [address] [place]o acknowledges that she has consented to an online visit or consultation  She understands that the online visit is based solely on information provided by her, and that, in the absence of a face-to-face physical evaluation by the physician, the diagnosis she receives is both limited and provisional in terms of accuracy and completeness  This is not intended to replace a full medical face-to-face evaluation by the physician  Tor Orn understands and accepts these terms

## 2020-08-10 ENCOUNTER — TELEPHONE (OUTPATIENT)
Dept: NEUROLOGY | Facility: CLINIC | Age: 30
End: 2020-08-10

## 2020-08-10 NOTE — TELEPHONE ENCOUNTER
Confirmed 8/11/2020 1:30PM [address] at [facility]  Registration completed  Covid-screening questions completed  Patient is coming alone to the office  Aware of all policies - mask, visitor, temperature and no show fee

## 2020-08-11 ENCOUNTER — HOSPITAL ENCOUNTER (EMERGENCY)
Facility: HOSPITAL | Age: 30
Discharge: HOME | End: 2020-08-11
Payer: COMMERCIAL

## 2020-08-11 ENCOUNTER — TELEPHONE (OUTPATIENT)
Dept: NEUROLOGY | Facility: CLINIC | Age: 30
End: 2020-08-11

## 2020-08-11 ENCOUNTER — OFFICE VISIT (OUTPATIENT)
Dept: NEUROLOGY | Facility: CLINIC | Age: 30
End: 2020-08-11
Payer: COMMERCIAL

## 2020-08-11 VITALS
DIASTOLIC BLOOD PRESSURE: 92 MMHG | TEMPERATURE: 97.2 F | WEIGHT: 191 LBS | OXYGEN SATURATION: 99 % | HEART RATE: 88 BPM | SYSTOLIC BLOOD PRESSURE: 143 MMHG | BODY MASS INDEX: 33.83 KG/M2 | RESPIRATION RATE: 18 BRPM

## 2020-08-11 DIAGNOSIS — G43.119 INTRACTABLE MIGRAINE WITH AURA WITHOUT STATUS MIGRAINOSUS: ICD-10-CM

## 2020-08-11 DIAGNOSIS — G89.29 CHRONIC NONINTRACTABLE HEADACHE, UNSPECIFIED HEADACHE TYPE: Primary | ICD-10-CM

## 2020-08-11 DIAGNOSIS — G43.709 CHRONIC MIGRAINE WITHOUT AURA WITHOUT STATUS MIGRAINOSUS, NOT INTRACTABLE: Primary | ICD-10-CM

## 2020-08-11 DIAGNOSIS — R41.89 COGNITIVE DECLINE: ICD-10-CM

## 2020-08-11 DIAGNOSIS — R87.610 ASCUS WITH POSITIVE HIGH RISK HPV CERVICAL: ICD-10-CM

## 2020-08-11 DIAGNOSIS — R87.810 ASCUS WITH POSITIVE HIGH RISK HPV CERVICAL: ICD-10-CM

## 2020-08-11 DIAGNOSIS — R51.9 CHRONIC NONINTRACTABLE HEADACHE, UNSPECIFIED HEADACHE TYPE: Primary | ICD-10-CM

## 2020-08-11 DIAGNOSIS — R53.82 CHRONIC FATIGUE: ICD-10-CM

## 2020-08-11 DIAGNOSIS — G47.9 SLEEP DISTURBANCE: ICD-10-CM

## 2020-08-11 DIAGNOSIS — Z76.89 ENCOUNTER FOR BIOPSY: ICD-10-CM

## 2020-08-11 PROCEDURE — G2012 BRIEF CHECK IN BY MD/QHP: HCPCS | Performed by: PHYSICIAN ASSISTANT

## 2020-08-11 PROCEDURE — 96375 TX/PRO/DX INJ NEW DRUG ADDON: CPT

## 2020-08-11 PROCEDURE — 96361 HYDRATE IV INFUSION ADD-ON: CPT

## 2020-08-11 PROCEDURE — 99282 EMERGENCY DEPT VISIT SF MDM: CPT | Performed by: EMERGENCY MEDICINE

## 2020-08-11 PROCEDURE — 99283 EMERGENCY DEPT VISIT LOW MDM: CPT

## 2020-08-11 PROCEDURE — 96374 THER/PROPH/DIAG INJ IV PUSH: CPT

## 2020-08-11 RX ORDER — DEXAMETHASONE SODIUM PHOSPHATE 4 MG/ML
8 INJECTION, SOLUTION INTRA-ARTICULAR; INTRALESIONAL; INTRAMUSCULAR; INTRAVENOUS; SOFT TISSUE ONCE
Status: COMPLETED | OUTPATIENT
Start: 2020-08-11 | End: 2020-08-11

## 2020-08-11 RX ORDER — DIPHENHYDRAMINE HYDROCHLORIDE 50 MG/ML
25 INJECTION INTRAMUSCULAR; INTRAVENOUS ONCE
Status: COMPLETED | OUTPATIENT
Start: 2020-08-11 | End: 2020-08-11

## 2020-08-11 RX ORDER — SUMATRIPTAN SUCCINATE 4 MG/.5ML
INJECTION, SOLUTION SUBCUTANEOUS
Qty: 4 ML | Refills: 0 | Status: SHIPPED | OUTPATIENT
Start: 2020-08-11 | End: 2020-11-11

## 2020-08-11 RX ORDER — KETOROLAC TROMETHAMINE 30 MG/ML
15 INJECTION, SOLUTION INTRAMUSCULAR; INTRAVENOUS ONCE
Status: COMPLETED | OUTPATIENT
Start: 2020-08-11 | End: 2020-08-11

## 2020-08-11 RX ORDER — KETOROLAC TROMETHAMINE 30 MG/ML
INJECTION, SOLUTION INTRAMUSCULAR; INTRAVENOUS
Qty: 6 ML | Refills: 1 | Status: SHIPPED | OUTPATIENT
Start: 2020-08-11 | End: 2021-02-11

## 2020-08-11 RX ORDER — METOCLOPRAMIDE HYDROCHLORIDE 5 MG/ML
10 INJECTION INTRAMUSCULAR; INTRAVENOUS ONCE
Status: COMPLETED | OUTPATIENT
Start: 2020-08-11 | End: 2020-08-11

## 2020-08-11 RX ORDER — ACETAMINOPHEN 325 MG/1
650 TABLET ORAL ONCE
Status: COMPLETED | OUTPATIENT
Start: 2020-08-11 | End: 2020-08-11

## 2020-08-11 RX ADMIN — METOCLOPRAMIDE 10 MG: 5 INJECTION, SOLUTION INTRAMUSCULAR; INTRAVENOUS at 05:52

## 2020-08-11 RX ADMIN — ACETAMINOPHEN 650 MG: 325 TABLET ORAL at 05:51

## 2020-08-11 RX ADMIN — DEXAMETHASONE SODIUM PHOSPHATE 8 MG: 4 INJECTION, SOLUTION INTRAMUSCULAR; INTRAVENOUS at 05:52

## 2020-08-11 RX ADMIN — DIPHENHYDRAMINE HYDROCHLORIDE 25 MG: 50 INJECTION INTRAMUSCULAR; INTRAVENOUS at 05:53

## 2020-08-11 RX ADMIN — SODIUM CHLORIDE 1000 ML: 0.9 INJECTION, SOLUTION INTRAVENOUS at 05:51

## 2020-08-11 RX ADMIN — KETOROLAC TROMETHAMINE 15 MG: 30 INJECTION, SOLUTION INTRAMUSCULAR at 05:52

## 2020-08-11 NOTE — ASSESSMENT & PLAN NOTE
Well controlled on a combination of Botox and Aimovig  Using both work better rather either one alone  Since starting botox, the patient reports greater than 7 days of migraine relief from baseline, correlated with headache diary, decreased abortive medication use and decreased ER visits  After adding Aimovig to botox, she reports a reduction of migraine headaches by greater than 50%  P r n  Toradol injection works better than oral Toradol, but unfortunately we are not sure if the Toradol injectable is covered by her insurance now  We discussed alternatives to use in the future, including sumatriptan injectable (imitrex PO ineffective this time)  She was agreeable  Side effects reviewed

## 2020-08-11 NOTE — ED PROVIDER NOTES
History  Chief Complaint   Patient presents with    Headache     This is a 32-year-old female who complains of left-sided headache  Patient has a history of migraines which she has been previously diagnosed with an states that this is typical   She states symptoms have been present for approximately 24 hours  Patient states to approximately 1 hour for symptoms to occur  There is no thunderclap headache  Denies any weakness, numbness or neuro deficits  States she did try to take sumatriptan with no affect  She states traditionally she has responded well to Benadryl, dexamethasone, and Toradol  She states she is not driving home  She states she otherwise feels well  She reports that she does not have an aura nor does she typically  Reports no neck stiffness, fevers or chills  States she is not immune compromised  She states this is not the worst headache that she has ever had  Rates the pain as severe and left-sided radiating to the back of the head  So far nothing has made it better  Worse with the sound and loud noises  Prior to Admission Medications   Prescriptions Last Dose Informant Patient Reported? Taking? ARIPiprazole (ABILIFY) 2 mg tablet   No Yes   Sig: Take 1 tablet (2 mg total) by mouth daily   B-D 3CC LUER-DEANN SYR 25GX1" 25G X 1" 3 ML MISC  Self Yes Yes   Cholecalciferol (VITAMIN D3) 70180 units CAPS  Self Yes Yes   Sig: Take by mouth once a week   Erenumab-aooe 140 MG/ML SOAJ   No Yes   Sig: Inject 140 mg under the skin every 30 (thirty) days   SUMAtriptan (IMITREX) 100 mg tablet   No Yes   Sig: Take 1 tablet (100 mg total) by mouth once as needed for migraine for up to 1 dose   Syringe/Needle, Disp, (SYRINGE 3CC/51AU2-1/4") 27G X 1-1/4" 3 ML MISC  Self No Yes   Sig: Use for IM injection of ketorolac     buPROPion (WELLBUTRIN XL) 150 mg 24 hr tablet   No Yes   Sig: Take 1 tablet (150 mg total) by mouth daily   escitalopram (LEXAPRO) 10 mg tablet   No Yes   Sig: Take 1 tablet (10 mg total) by mouth daily   etonogestrel-ethinyl estradiol (NuvaRing) 0 12-0 015 MG/24HR vaginal ring   No Yes   Sig: Insert ring for 21 days then remove for one week    ketorolac (TORADOL) 60 mg/2 mL  Self No Yes   Sig: Inject 1-2 mL (30-60 mg total) into a muscle every 6 (six) hours as needed for moderate pain   melatonin 1 mg  Self No Yes   Si-2 tabs qhs prn insomnia   naproxen (NAPROSYN) 500 mg tablet  Self No Yes   Sig: Take 1 tablet (500 mg total) by mouth as needed for headaches   onabotulinumtoxin A (BOTOX) 100 units  Self Yes Yes   Sig: Inject as directed   phentermine (ADIPEX-P) 37 5 MG tablet   No Yes   Sig: Take 1 tablet (37 5 mg total) by mouth daily   pregabalin (LYRICA) 25 mg capsule   No Yes   Sig: Take 1 capsule (25 mg total) by mouth 2 (two) times a day   prochlorperazine (COMPAZINE) 10 mg tablet  Self No Yes   Sig: Take 1 tablet (10 mg total) by mouth every 6 (six) hours as needed for nausea or vomiting      Facility-Administered Medications Last Administration Doses Remaining   cyanocobalamin injection 1,000 mcg 8/3/2020  8:59 AM    cyanocobalamin injection 1,000 mcg 2020 11:46 AM           Past Medical History:   Diagnosis Date    Abnormal Pap smear of cervix     Anxiety     Arthritis     Depression     Fibromyalgia, primary     Migraine     Vitamin B12 deficiency        Past Surgical History:   Procedure Laterality Date    COLONOSCOPY N/A 2018    Procedure: COLONOSCOPY;  Surgeon: Silvio Resendiz MD;  Location: [facility];   Service: Gastroenterology    TONSILLECTOMY      WISDOM TOOTH EXTRACTION         Family History   Problem Relation Age of Onset    Rheum arthritis Mother     Psoriasis Mother     Other Mother     Hypertension Mother     Diabetes unspecified Mother     Sjogren's syndrome Mother     Alcohol abuse Mother     Drug abuse Mother     Anxiety disorder Father     Alcohol abuse Father     Cancer Other     Diabetes Other     Other Other         High blood pressure    Depression Maternal Grandmother      I have reviewed and agree with the history as documented  E-Cigarette/Vaping    E-Cigarette Use Current Some Day User     Start Date 7/1/19     Comments medical marijuana       E-Cigarette/Vaping Substances    Nicotine No     THC Yes     CBD No     Flavoring No     Other No     Unknown No      Social History     Tobacco Use    Smoking status: Never Smoker    Smokeless tobacco: Never Used   Substance Use Topics    Alcohol use: No    Drug use: Yes     Types: Marijuana     Comment: Medical marijuana       Review of Systems   Constitutional: Negative for activity change, chills, fatigue and fever  HENT: Negative for congestion  Eyes: Negative for visual disturbance  Respiratory: Negative for cough, chest tightness and shortness of breath  Cardiovascular: Negative for chest pain  Gastrointestinal: Negative for abdominal pain, diarrhea and vomiting  Endocrine: Negative for polyuria  Genitourinary: Negative for dysuria  Skin: Negative for rash  Allergic/Immunologic: Negative for immunocompromised state  Neurological: Positive for headaches  Negative for dizziness, tremors, seizures, syncope, facial asymmetry, speech difficulty, weakness, light-headedness and numbness  Physical Exam  Physical Exam  Constitutional:       Appearance: She is well-developed  HENT:      Head: Normocephalic and atraumatic  Mouth/Throat:      Mouth: Mucous membranes are moist       Pharynx: Oropharynx is clear  No oropharyngeal exudate or posterior oropharyngeal erythema  Eyes:      General:         Right eye: No discharge  Left eye: No discharge  Extraocular Movements: Extraocular movements intact  Conjunctiva/sclera: Conjunctivae normal       Pupils: Pupils are equal, round, and reactive to light  Neck:      Musculoskeletal: Normal range of motion and neck supple     Cardiovascular:      Rate and Rhythm: Normal rate and regular rhythm  Heart sounds: Normal heart sounds  Pulmonary:      Effort: Pulmonary effort is normal  No respiratory distress  Breath sounds: Normal breath sounds  Abdominal:      General: Bowel sounds are normal       Palpations: Abdomen is soft  Musculoskeletal: Normal range of motion  Skin:     General: Skin is warm and dry  Capillary Refill: Capillary refill takes less than 2 seconds  Neurological:      General: No focal deficit present  Mental Status: She is alert and oriented to person, place, and time  Cranial Nerves: No cranial nerve deficit  Sensory: No sensory deficit  Motor: No weakness  Coordination: Coordination normal       Gait: Gait normal       Deep Tendon Reflexes: Reflexes normal    Psychiatric:         Mood and Affect: Mood normal          Behavior: Behavior normal          Vital Signs  ED Triage Vitals [08/11/20 0531]   Temperature Pulse Respirations Blood Pressure SpO2   (!) 97 2 °F (36 2 °C) 88 18 143/92 99 %      Temp src Heart Rate Source Patient Position - Orthostatic VS BP Location FiO2 (%)   -- -- -- -- --      Pain Score       8           Vitals:    08/11/20 0531   BP: 143/92   Pulse: 88         Visual Acuity      ED Medications  Medications   sodium chloride 0 9 % bolus 1,000 mL (1,000 mL Intravenous New Bag 8/11/20 0551)   acetaminophen (TYLENOL) tablet 650 mg (650 mg Oral Given 8/11/20 0551)   dexamethasone (DECADRON) injection 8 mg (8 mg Intravenous Given 8/11/20 0552)   metoclopramide (REGLAN) injection 10 mg (10 mg Intravenous Given 8/11/20 0552)   diphenhydrAMINE (BENADRYL) injection 25 mg (25 mg Intravenous Given 8/11/20 0553)   ketorolac (TORADOL) injection 15 mg (15 mg Intravenous Given 8/11/20 1953)       Diagnostic Studies  Results Reviewed     None                 No orders to display              Procedures  Procedures         ED Course       US AUDIT      Most Recent Value   Initial Alcohol Screen: US AUDIT-C    1   How often do you have a drink containing alcohol?  0 Filed at: 08/11/2020 0532   2  How many drinks containing alcohol do you have on a typical day you are drinking? 0 Filed at: 08/11/2020 0532   3a  Male UNDER 65: How often do you have five or more drinks on one occasion? 0 Filed at: 08/11/2020 0532   3b  FEMALE Any Age, or MALE 65+: How often do you have 4 or more drinks on one occassion? 0 Filed at: 08/11/2020 0532   Audit-C Score  0 Filed at: 08/11/2020 0532                  BRUCE/DAST-10      Most Recent Value   How many times in the past year have you    Used an illegal drug or used a prescription medication for non-medical reasons? Never Filed at: 08/11/2020 0533                                MDM  Number of Diagnoses or Management Options  Chronic nonintractable headache, unspecified headache type: new and requires workup  Intractable migraine with aura without status migrainosus:   Diagnosis management comments: This is a 67-year-old female with a headache which is typical for her migraines  She states she feels significantly better and is eager to return home  Patient appears clinically well  No neuro defects  Reassured by patient saying this is her norm and quick response  Patient declined blood tests and imaging  Discussed warning signs and symptoms with the patient as well as when to return to the emergency department versus follow up with PCP  Patient states understanding and agreement with the plan             Disposition  Final diagnoses:   Chronic nonintractable headache, unspecified headache type   Intractable migraine with aura without status migrainosus     Time reflects when diagnosis was documented in both MDM as applicable and the Disposition within this note     Time User Action Codes Description Comment    8/11/2020  5:51 AM Arthur Maxwell [R51] Chronic nonintractable headache, unspecified headache type     8/11/2020  5:52 AM Arthur Maxwell [G43 [id number]] Intractable migraine with aura without status migrainosus       ED Disposition     ED Disposition Condition Date/Time Comment    Discharge Stable Tue Aug 11, 2020  5:51 AM Lyn Valle discharge to home/self care              Follow-up Information     Follow up With Specialties Details Why Contact Info    Sherry Carmen DO Family Medicine In 1 day  [address] [address]  [place] [address]  [phone]            Patient's Medications   Discharge Prescriptions    No medications on file         PDMP Review       Value Time User    PDMP Reviewed  Yes 6/24/2020  4:40 PM Sherry Carmen DO          ED Provider  Electronically Signed by           Ja Ramirez MD  08/11/20 5260

## 2020-08-11 NOTE — PROGRESS NOTES
Virtual Brief Visit    Assessment/Plan:    Problem List Items Addressed This Visit        Cardiovascular and Mediastinum    Chronic migraine without aura without status migrainosus, not intractable - Primary     Well controlled on a combination of Botox and Aimovig  Using both work better rather either one alone  Since starting botox, the patient reports greater than 7 days of migraine relief from baseline, correlated with headache diary, decreased abortive medication use and decreased ER visits  After adding Aimovig to botox, she reports a reduction of migraine headaches by greater than 50%  P r n  Toradol injection works better than oral Toradol, but unfortunately we are not sure if the Toradol injectable is covered by her insurance now  We discussed alternatives to use in the future, including sumatriptan injectable (imitrex PO ineffective this time)  She was agreeable  Side effects reviewed  Relevant Medications    ketorolac (TORADOL) 60 mg/2 mL    SUMAtriptan (IMITREX) 4 mg/0 5 mL SOAJ       Other    Chronic fatigue    Cognitive decline     Word-finding issues and some short-term loss without significant issues ADLs  Will follow  Likely this is secondary to psychiatric conditions, chronic fatigue of multiple health conditions including fibromyalgia, poor sleep function (no apnea) and migraines  Recommended some lifestyle changes including continued aerobic exercises on a regular basis, healthy diet, staying as active as possible from mental perspective  Not sure if there is a behavioral disorder  She will check in with Psychiatry if it would be applicable to test for this  If needed moving forward we can have her see neuro psychologist here  Sleep disturbance     Continue 5 mg melatonin q h s  This is helping at least partially  The patient should not hesitate to call me prior to her follow up with any questions or concerns    The patient was instructed to urgently call 911 or present to the nearest emergency room with any new or worsening neurological deficits  Reason for visit is   Chief Complaint   Patient presents with    Virtual Brief Visit        Encounter provider Cindie Frankel, PA-C    Provider located at [address] [address]    Recent Visits  Date Type Provider Dept   08/11/20 Office Visit Zari Domínguez PA-C [facility] [place]   08/10/20 Telephone Raúlann  98  recent visits within past 7 days and meeting all other requirements     Future Appointments  No visits were found meeting these conditions  Showing future appointments within next 150 days and meeting all other requirements        After connecting through telephone, the patient was identified by name and date of birth  Jose Oswaldo was informed that this is a telemedicine visit and that the visit is being conducted through telephone  My office door was closed  No one else was in the room  She acknowledged consent and understanding of privacy and security of the platform  The patient has agreed to participate and understands she can discontinue the visit at any time  Patient is aware this is a billable service  It was my intent to perform this visit via video technology but the patient was not able to do a video connection so the visit was completed via audio telephone only  Marcial Morales is a 35 y o  female who is contacted via telemedicine for neurological follow-up  HPI     Migraines:  The patient reports severe migraine headaches for which she went to the ER 11/20/2020  She was given cocktail this improved her migraine  She still has residual headache today but the migraine overall is improved  For her current migraine, prior to her ED visit, she tried and failed Imitrex twice  She took Imitrex yesterday at 2:00 p m   And then again at 9:00 p m  Deleonjw Michel She states the current migraine was triggered by going to get her nails done and the odor/ smells in that environment triggered a migraine  In the past Maxalt failed because it caused side effects of worsening confusion, worsening migraine  Toradol injectable works better than oral Toradol, but unfortunately lately Toradol injectable has been denied by her insurance  Thankfully the combination of Botox and Cristian Latrice has reduced migraine headaches by greater than 50%  She wants to continue these and she denies side effects  Since starting botox, the patient reports greater than 7 days of migraine relief from baseline, correlated with headache diary, decreased abortive medication use and decreased ER visits  Memory: This visit was initially scheduled to discuss some mild short-term memory loss and word-finding issues  Unfortunately due to the migraine and ER visit, the patient is unable to come into the office, and this is why we did a telephone visit  During a telephone visit is difficult to assess her mental state because of Hardy an MMSE cannot be done and full  She agreed to address this at the next in-person visit  For the last few months she has been having more short-term memory loss and word-finding issues:  She states that her mom last her to do something such as the laundry, dishes, go to the grocery store, and the patient forgets and then she feels bad  Word-finding issues have been more of a concern  Sometimes she cannot find the right word, forgets names, etc   This is worse when she is in pain typically, or worse with a migraine  Continues 1000 micro g B12 daily  Initially did injections and then transitioned to oral     Continues medical marijuana only for severe pain, tries to avoid it because it makes her more tired      Past Medical History:   Diagnosis Date    Abnormal Pap smear of cervix     Anxiety     Arthritis     Depression     Fibromyalgia, primary     Migraine     Vitamin B12 deficiency        Past Surgical History:   Procedure Laterality Date    COLONOSCOPY N/A 5/8/2018    Procedure: COLONOSCOPY;  Surgeon: Mara Jones MD;  [facility]; Service: Gastroenterology    TONSILLECTOMY      WISDOM TOOTH EXTRACTION         Current Outpatient Medications   Medication Sig Dispense Refill    ARIPiprazole (ABILIFY) 2 mg tablet Take 1 tablet (2 mg total) by mouth daily 30 tablet 2    B-D 3CC LUER-DEANN SYR 25GX1" 25G X 1" 3 ML MISC       buPROPion (WELLBUTRIN XL) 150 mg 24 hr tablet Take 1 tablet (150 mg total) by mouth daily 30 tablet 2    Cholecalciferol (VITAMIN D3) 84425 units CAPS Take by mouth once a week      Erenumab-aooe 140 MG/ML SOAJ Inject 140 mg under the skin every 30 (thirty) days 1 pen 11    escitalopram (LEXAPRO) 10 mg tablet Take 1 tablet (10 mg total) by mouth daily 30 tablet 2    etonogestrel-ethinyl estradiol (NuvaRing) 0 12-0 015 MG/24HR vaginal ring Insert ring for 21 days then remove for one week  3 each 3    ketorolac (TORADOL) 60 mg/2 mL Inject 1-2 mL (30-60 mg total) into a muscle every 6 (six) hours as needed for moderate pain 6 mL 1    melatonin 1 mg 1-2 tabs qhs prn insomnia 60 tablet 2    naproxen (NAPROSYN) 500 mg tablet Take 1 tablet (500 mg total) by mouth as needed for headaches 60 tablet 1    onabotulinumtoxin A (BOTOX) 100 units Inject as directed      pregabalin (LYRICA) 25 mg capsule Take 1 capsule (25 mg total) by mouth 2 (two) times a day 60 capsule 2    prochlorperazine (COMPAZINE) 10 mg tablet Take 1 tablet (10 mg total) by mouth every 6 (six) hours as needed for nausea or vomiting 30 tablet 0    SUMAtriptan (IMITREX) 100 mg tablet Take 1 tablet (100 mg total) by mouth once as needed for migraine for up to 1 dose (Patient not taking: Reported on 8/13/2020) 9 tablet 2    Syringe/Needle, Disp, (SYRINGE 3CC/50IS9-5/4") 27G X 1-1/4" 3 ML MISC Use for IM injection of ketorolac  4 each 0    ketorolac (TORADOL) 10 mg tablet Take 1 tablet (10 mg total) by mouth every 6 (six) hours as needed (migraine) Max 2-3 per week  10 tablet 0    phentermine (ADIPEX-P) 37 5 MG tablet Take 1 tablet (37 5 mg total) by mouth daily (Patient not taking: Reported on 8/11/2020) 30 tablet 1    SUMAtriptan (IMITREX) 4 mg/0 5 mL SOAJ One injection p r n  Migraine, repeat after 1 hour if needed  Max 2 per day and 3 per week  Hold PO imitrex  4 mL 0     Current Facility-Administered Medications   Medication Dose Route Frequency Provider Last Rate Last Dose    cyanocobalamin injection 1,000 mcg  1,000 mcg Intramuscular Q30 Days Megan Helmman, DO   1,000 mcg at 08/03/20 1390    cyanocobalamin injection 1,000 mcg  1,000 mcg Intramuscular Q14 Days Megan Preciado, DO   1,000 mcg at 05/18/20 1146        Allergies   Allergen Reactions    Depakote Er  [Valproic Acid]      Other reaction(s): dilated pupils, "schizi"    Desvenlafaxine      Other reaction(s): state of confusion       Review of Systems   Constitutional: Negative  Negative for appetite change and fever  HENT: Negative  Negative for hearing loss, tinnitus, trouble swallowing and voice change  Eyes: Positive for pain (with migraine)  Negative for photophobia  Respiratory: Negative  Negative for shortness of breath  Cardiovascular: Negative  Negative for palpitations  Gastrointestinal: Positive for nausea (with migraine)  Negative for vomiting  Endocrine: Negative  Negative for cold intolerance  Genitourinary: Negative  Negative for dysuria, frequency and urgency  Musculoskeletal: Positive for myalgias  Negative for neck pain  Skin: Negative  Negative for rash  Neurological: Positive for speech difficulty (minor slurred speech when migraines are too painful , does not happen all the time ) and headaches (gets 3 to 4 a month )   Negative for dizziness, tremors, seizures, syncope, facial asymmetry, weakness, light-headedness and numbness  Patient went to ER yesterday but states she is feeling so much better today  Hematological: Bruises/bleeds easily  Psychiatric/Behavioral: Negative  Negative for confusion, hallucinations and sleep disturbance  The following portions of the patient's history were reviewed and updated as appropriate: allergies, current medications/ medication history, past family history, past medical history, past social history, past surgical history and problem list     Review of systems was reviewed and otherwise unremarkable from a neurological perspective  There were no vitals filed for this visit  I spent 25 minutes directly with the patient during this visit    VIRTUAL VISIT [id number] acknowledges that she has consented to an online visit or consultation  She understands that the online visit is based solely on information provided by her, and that, in the absence of a face-to-face physical evaluation by the physician, the diagnosis she receives is both limited and provisional in terms of accuracy and completeness  This is not intended to replace a full medical face-to-face evaluation by the physician  Reta Ybarra understands and accepts these terms

## 2020-08-11 NOTE — TELEPHONE ENCOUNTER
Call received from [facility] 23  Ketorolac injection requires PA  PA to be submitted to [facility]  Awaiting determination      [facility]  ID: [id number]  GROUP: [id number]  N: [id number]  BIN: [id number]

## 2020-08-11 NOTE — ASSESSMENT & PLAN NOTE
Word-finding issues and some short-term loss without significant issues ADLs  Will follow  Likely this is secondary to psychiatric conditions, chronic fatigue of multiple health conditions including fibromyalgia, poor sleep function (no apnea) and migraines  Recommended some lifestyle changes including continued aerobic exercises on a regular basis, healthy diet, staying as active as possible from mental perspective  Not sure if there is a behavioral disorder  She will check in with Psychiatry if it would be applicable to test for this  If needed moving forward we can have her see neuro psychologist here

## 2020-08-13 ENCOUNTER — HOSPITAL ENCOUNTER (EMERGENCY)
Facility: HOSPITAL | Age: 30
Discharge: HOME | End: 2020-08-13
Attending: EMERGENCY MEDICINE
Payer: COMMERCIAL

## 2020-08-13 ENCOUNTER — TELEPHONE (OUTPATIENT)
Dept: NEUROLOGY | Facility: CLINIC | Age: 30
End: 2020-08-13

## 2020-08-13 VITALS
OXYGEN SATURATION: 100 % | TEMPERATURE: 98.2 F | SYSTOLIC BLOOD PRESSURE: 151 MMHG | DIASTOLIC BLOOD PRESSURE: 82 MMHG | WEIGHT: 190 LBS | RESPIRATION RATE: 16 BRPM | BODY MASS INDEX: 29.82 KG/M2 | HEIGHT: 67 IN | HEART RATE: 84 BPM

## 2020-08-13 DIAGNOSIS — G43.909 MIGRAINE: Primary | ICD-10-CM

## 2020-08-13 DIAGNOSIS — G43.709 CHRONIC MIGRAINE WITHOUT AURA WITHOUT STATUS MIGRAINOSUS, NOT INTRACTABLE: Primary | ICD-10-CM

## 2020-08-13 DIAGNOSIS — G44.59 OTHER COMPLICATED HEADACHE SYNDROME: ICD-10-CM

## 2020-08-13 PROCEDURE — 99284 EMERGENCY DEPT VISIT MOD MDM: CPT | Performed by: PHYSICIAN ASSISTANT

## 2020-08-13 PROCEDURE — 96375 TX/PRO/DX INJ NEW DRUG ADDON: CPT

## 2020-08-13 PROCEDURE — 96365 THER/PROPH/DIAG IV INF INIT: CPT

## 2020-08-13 PROCEDURE — 99283 EMERGENCY DEPT VISIT LOW MDM: CPT

## 2020-08-13 RX ORDER — KETOROLAC TROMETHAMINE 30 MG/ML
30 INJECTION, SOLUTION INTRAMUSCULAR; INTRAVENOUS ONCE
Status: COMPLETED | OUTPATIENT
Start: 2020-08-13 | End: 2020-08-13

## 2020-08-13 RX ORDER — METOCLOPRAMIDE HYDROCHLORIDE 5 MG/ML
10 INJECTION INTRAMUSCULAR; INTRAVENOUS ONCE
Status: COMPLETED | OUTPATIENT
Start: 2020-08-13 | End: 2020-08-13

## 2020-08-13 RX ORDER — DIPHENHYDRAMINE HYDROCHLORIDE 50 MG/ML
50 INJECTION INTRAMUSCULAR; INTRAVENOUS ONCE
Status: COMPLETED | OUTPATIENT
Start: 2020-08-13 | End: 2020-08-13

## 2020-08-13 RX ORDER — MAGNESIUM SULFATE HEPTAHYDRATE 40 MG/ML
2 INJECTION, SOLUTION INTRAVENOUS ONCE
Status: COMPLETED | OUTPATIENT
Start: 2020-08-13 | End: 2020-08-13

## 2020-08-13 RX ADMIN — DIPHENHYDRAMINE HYDROCHLORIDE 50 MG: 50 INJECTION INTRAMUSCULAR; INTRAVENOUS at 18:35

## 2020-08-13 RX ADMIN — MAGNESIUM SULFATE IN WATER 2 G: 40 INJECTION, SOLUTION INTRAVENOUS at 18:36

## 2020-08-13 RX ADMIN — KETOROLAC TROMETHAMINE 30 MG: 30 INJECTION, SOLUTION INTRAMUSCULAR at 18:35

## 2020-08-13 RX ADMIN — METOCLOPRAMIDE 10 MG: 5 INJECTION, SOLUTION INTRAMUSCULAR; INTRAVENOUS at 18:35

## 2020-08-13 RX ADMIN — SODIUM CHLORIDE 1000 ML: 0.9 INJECTION, SOLUTION INTRAVENOUS at 18:35

## 2020-08-13 NOTE — ED PROVIDER NOTES
History  Chief Complaint   Patient presents with    Migraine     x4 days, seen at this facilty two days ago  denies dizziness, blurred vision, admits to nausea and sensitivity to light and sound     Patient presents to the emergency department today for evaluation of left-sided headache  Patient states the headache is behind the left eye into the left frontal region of her head  She states this has been ongoing over the last few days  She denies any trauma  She denies neck stiffness or fever  She does have a history of chronic migraines and takes both prophylactic as well as abortive medications for this  Patient does not exhibit any red flag symptoms  She states this headache is the exact headache that she always has and has no novel symptoms whatsoever  She was here 2 days ago did receive a migraine cocktail  It is of note that she did not take her Toradol naproxen today  She last took Benadryl at 0700 hours this morning  She does admit to photosensitivity and nausea which is classic of her migraines  She denies abdominal pain  She denies pregnancy states she had a recent negative pregnancy test and has not had sex relations with men  I had an extensive conversation with her regarding the workup today  She is refusing CT scan for the stated reason of that this is a classic migraine and she does not want the radiation  This is a reasonable decision  Prior to Admission Medications   Prescriptions Last Dose Informant Patient Reported? Taking?    ARIPiprazole (ABILIFY) 2 mg tablet 8/13/2020 at Unknown time  No Yes   Sig: Take 1 tablet (2 mg total) by mouth daily   B-D 3CC LUER-DEANN SYR 25GX1" 25G X 1" 3 ML MISC  Self Yes No   Cholecalciferol (VITAMIN D3) 16030 units CAPS 8/13/2020 at Unknown time Self Yes Yes   Sig: Take by mouth once a week   Erenumab-aooe 140 MG/ML SOAJ Past Month at Unknown time  No Yes   Sig: Inject 140 mg under the skin every 30 (thirty) days   SUMAtriptan (IMITREX) 100 mg tablet Not Taking at Unknown time  No No   Sig: Take 1 tablet (100 mg total) by mouth once as needed for migraine for up to 1 dose   Patient not taking: Reported on 2020   SUMAtriptan (IMITREX) 4 mg/0 5 mL SOAJ   No No   Sig: One injection p r n  Migraine, repeat after 1 hour if needed  Max 2 per day and 3 per week  Hold PO imitrex  Syringe/Needle, Disp, (SYRINGE 3CC/32AT1-1/4") 27G X 1-4" 3 ML MISC  Self No No   Sig: Use for IM injection of ketorolac     buPROPion (WELLBUTRIN XL) 150 mg 24 hr tablet 2020 at Unknown time  No Yes   Sig: Take 1 tablet (150 mg total) by mouth daily   escitalopram (LEXAPRO) 10 mg tablet 2020 at Unknown time  No Yes   Sig: Take 1 tablet (10 mg total) by mouth daily   etonogestrel-ethinyl estradiol (NuvaRing) 0 12-0 015 MG/24HR vaginal ring 2020 at Unknown time  No Yes   Sig: Insert ring for 21 days then remove for one week    ketorolac (TORADOL) 10 mg tablet   No No   Sig: Take 1 tablet (10 mg total) by mouth every 6 (six) hours as needed (migraine) Max 2-3 per week    ketorolac (TORADOL) 60 mg/2 mL Past Week at Unknown time  No Yes   Sig: Inject 1-2 mL (30-60 mg total) into a muscle every 6 (six) hours as needed for moderate pain   melatonin 1 mg 2020 at Unknown time Self No Yes   Si-2 tabs qhs prn insomnia   naproxen (NAPROSYN) 500 mg tablet 2020 at Unknown time Self No Yes   Sig: Take 1 tablet (500 mg total) by mouth as needed for headaches   onabotulinumtoxin A (BOTOX) 100 units  Self Yes No   Sig: Inject as directed   phentermine (ADIPEX-P) 37 5 MG tablet Not Taking at Unknown time  No No   Sig: Take 1 tablet (37 5 mg total) by mouth daily   Patient not taking: Reported on 2020   pregabalin (LYRICA) 25 mg capsule 2020 at Unknown time  No Yes   Sig: Take 1 capsule (25 mg total) by mouth 2 (two) times a day   prochlorperazine (COMPAZINE) 10 mg tablet 2020 at Unknown time Self No Yes   Sig: Take 1 tablet (10 mg total) by mouth every 6 (six) hours as needed for nausea or vomiting      Facility-Administered Medications Last Administration Doses Remaining   cyanocobalamin injection 1,000 mcg 8/3/2020  8:59 AM    cyanocobalamin injection 1,000 mcg 5/18/2020 11:46 AM           Past Medical History:   Diagnosis Date    Abnormal Pap smear of cervix     Anxiety     Arthritis     Depression     Fibromyalgia, primary     Migraine     Vitamin B12 deficiency        Past Surgical History:   Procedure Laterality Date    COLONOSCOPY N/A 5/8/2018    Procedure: COLONOSCOPY;  Surgeon: Wilberto Yen MD;  Location: [facility] GI LAB; Service: Gastroenterology    TONSILLECTOMY      WISDOM TOOTH EXTRACTION         Family History   Problem Relation Age of Onset    Rheum arthritis Mother     Psoriasis Mother     Other Mother     Hypertension Mother     Diabetes unspecified Mother     Sjogren's syndrome Mother     Alcohol abuse Mother     Drug abuse Mother     Anxiety disorder Father     Alcohol abuse Father     Cancer Other     Diabetes Other     Other Other         High blood pressure    Depression Maternal Grandmother      I have reviewed and agree with the history as documented  E-Cigarette/Vaping    E-Cigarette Use Current Some Day User     Start Date 7/1/19     Comments medical marijuana       E-Cigarette/Vaping Substances    Nicotine No     THC Yes     CBD No     Flavoring No     Other No     Unknown No      Social History     Tobacco Use    Smoking status: Never Smoker    Smokeless tobacco: Never Used   Substance Use Topics    Alcohol use: No    Drug use: Yes     Types: Marijuana     Comment: Medical marijuana       Review of Systems   Constitutional: Negative  Negative for chills and fever  HENT: Negative  Negative for sore throat and trouble swallowing  Eyes: Negative  Respiratory: Negative  Negative for cough, shortness of breath and wheezing  Cardiovascular: Negative    Negative for chest pain and leg swelling  Gastrointestinal: Positive for nausea  Negative for abdominal pain, blood in stool and vomiting  Endocrine: Negative  Genitourinary: Negative  Musculoskeletal: Negative  Negative for neck stiffness  Skin: Negative  Allergic/Immunologic: Negative  Neurological: Positive for headaches  Negative for dizziness, seizures, speech difficulty, weakness, light-headedness and numbness  Hematological: Negative  Psychiatric/Behavioral: Negative  All other systems reviewed and are negative  Physical Exam  Physical Exam  Vitals signs reviewed  Constitutional:       General: She is not in acute distress  Appearance: Normal appearance  She is well-developed and normal weight  She is not ill-appearing, toxic-appearing or diaphoretic  HENT:      Head: Normocephalic  Right Ear: Tympanic membrane, ear canal and external ear normal  No swelling  There is no impacted cerumen  Tympanic membrane is not bulging  Left Ear: Tympanic membrane, ear canal and external ear normal  No swelling  There is no impacted cerumen  Tympanic membrane is not bulging  Nose: Nose normal  No congestion or rhinorrhea  Mouth/Throat:      Mouth: Mucous membranes are moist       Pharynx: No oropharyngeal exudate  Eyes:      General: Lids are normal  No scleral icterus  Right eye: No discharge  Left eye: No discharge  Extraocular Movements: Extraocular movements intact  Conjunctiva/sclera: Conjunctivae normal       Pupils: Pupils are equal, round, and reactive to light  Neck:      Musculoskeletal: Normal range of motion and neck supple  Normal range of motion  No edema  Thyroid: No thyromegaly  Vascular: No JVD  Trachea: No tracheal deviation  Cardiovascular:      Rate and Rhythm: Normal rate and regular rhythm  Pulses: Normal pulses  Heart sounds: Normal heart sounds  No murmur  No friction rub  No gallop      Pulmonary:      Effort: Pulmonary effort is normal  No respiratory distress  Breath sounds: Normal breath sounds  No stridor  No wheezing or rales  Chest:      Chest wall: No tenderness  Abdominal:      General: Bowel sounds are normal  There is no distension  Palpations: Abdomen is soft  There is no mass  Tenderness: There is no abdominal tenderness  There is no guarding or rebound  Negative signs include Segura's sign  Hernia: No hernia is present  Musculoskeletal: Normal range of motion  General: No tenderness  Lymphadenopathy:      Cervical: No cervical adenopathy  Skin:     General: Skin is warm and dry  Capillary Refill: Capillary refill takes less than 2 seconds  Coloration: Skin is not pale  Findings: No erythema or rash  Neurological:      General: No focal deficit present  Mental Status: She is alert and oriented to person, place, and time  Mental status is at baseline  GCS: GCS eye subscore is 4  GCS verbal subscore is 5  GCS motor subscore is 6  Cranial Nerves: No cranial nerve deficit  Sensory: No sensory deficit  Motor: No weakness  Coordination: Coordination normal       Gait: Gait normal       Deep Tendon Reflexes: Reflexes are normal and symmetric   Reflexes normal    Psychiatric:         Mood and Affect: Mood normal          Speech: Speech normal          Behavior: Behavior normal          Vital Signs  ED Triage Vitals [08/13/20 1638]   Temperature Pulse Respirations Blood Pressure SpO2   98 2 °F (36 8 °C) 89 16 151/82 100 %      Temp Source Heart Rate Source Patient Position - Orthostatic VS BP Location FiO2 (%)   Temporal -- -- -- --      Pain Score       7           Vitals:    08/13/20 1638   BP: 151/82   Pulse: 89         Visual Acuity      ED Medications  Medications   sodium chloride 0 9 % bolus 1,000 mL (1,000 mL Intravenous New Bag 8/13/20 8668)   magnesium sulfate 2 g/50 mL IVPB (premix) 2 g (2 g Intravenous New Bag 8/13/20 1836) ketorolac (TORADOL) injection 30 mg (30 mg Intravenous Given 8/13/20 1835)   diphenhydrAMINE (BENADRYL) injection 50 mg (50 mg Intravenous Given 8/13/20 1835)   metoclopramide (REGLAN) injection 10 mg (10 mg Intravenous Given 8/13/20 1835)       Diagnostic Studies  Results Reviewed     None                 No orders to display              Procedures  Procedures         ED Course  ED Course as of Aug 13 1932   u Aug 13, 2020   1703 Blood Pressure: 151/82   1703 Temperature: 98 2 °F (36 8 °C)   1703 Pulse: 89   1703 Respirations: 16   1703 SpO2: 100 %   1907 Patient was recheck, she was sleeping at bedside with family  Will recheck when she is awake      1929 Patient was re-evaluated again at 1730 hours  She states her headache is 2/10 at this point wishes to be discharged home  No other concerning neurologic symptoms are noted          US AUDIT      Most Recent Value   Initial Alcohol Screen: US AUDIT-C    1  How often do you have a drink containing alcohol?  0 Filed at: 08/13/2020 1639   2  How many drinks containing alcohol do you have on a typical day you are drinking? 0 Filed at: 08/13/2020 1639   3a  Male UNDER 65: How often do you have five or more drinks on one occasion? 0 Filed at: 08/13/2020 1639   3b  FEMALE Any Age, or MALE 65+: How often do you have 4 or more drinks on one occassion? 0 Filed at: 08/13/2020 1639   Audit-C Score  0 Filed at: 08/13/2020 1639                  BRUCE/DAST-10      Most Recent Value   How many times in the past year have you    Used an illegal drug or used a prescription medication for non-medical reasons?   Never Filed at: 08/13/2020 1640                                MDM      Disposition  Final diagnoses:   Migraine     Time reflects when diagnosis was documented in both MDM as applicable and the Disposition within this note     Time User Action Codes Description Comment    8/13/2020  7:31 PM Natividad Fitzpatrick Add [G43 909] Migraine       ED Disposition     ED Disposition Condition Date/Time Comment    Discharge Stable Thu Aug 13, 2020  7:31 PM Rondel Poag discharge to home/self care  Follow-up Information     Follow up With Specialties Details Why Contact Info    Megan Preciado DO Family Medicine Schedule an appointment as soon as possible for a visit  As needed Rich [place]  [phone]            Patient's Medications   Discharge Prescriptions    KETOROLAC (TORADOL) 10 MG TABLET    Take 1 tablet (10 mg total) by mouth every 6 (six) hours as needed (migraine) Max 2-3 per week  Start Date: 8/13/2020 End Date: --       Order Dose: 10 mg       Quantity: 10 tablet    Refills: 0     No discharge procedures on file      PDMP Review       Value Time User    PDMP Reviewed  Yes 6/24/2020  4:40 PM Megan Preciado DO          ED Provider  Electronically Signed by           Robert Murguia PA-C  08/13/20 1932

## 2020-08-13 NOTE — TELEPHONE ENCOUNTER
Will send PO toradol  I sent IM toradol injection- but guessing this was not covered  inodcin if that fails  Called pt and LVM  She is currently in ED

## 2020-08-13 NOTE — TELEPHONE ENCOUNTER
When did migraine start? Came back yesterday  Location/Description: unilateral in the left frontal area  Pain scale: 7  Associated symptoms:nausea, sonophobia, photophobia  Alleviating factors: meds at ED, migraine cocktail on 8/11    What medications have you tried for this migraine headache?   imitrex inj this morning; didn't help  Tylenol and benadryl taken yesterday and today, not helpful    Current migraine medications are confirmed as:  aimovig monthly  botox    Medications tried in the past? Steroids don't usually help; allergic to depakote; not sure about olanzapine  [facility] pharm

## 2020-08-15 ENCOUNTER — HOSPITAL ENCOUNTER (EMERGENCY)
Facility: HOSPITAL | Age: 30
Discharge: HOME | End: 2020-08-15
Attending: FAMILY MEDICINE | Admitting: FAMILY MEDICINE
Payer: COMMERCIAL

## 2020-08-15 VITALS
BODY MASS INDEX: 29.82 KG/M2 | TEMPERATURE: 97.4 F | RESPIRATION RATE: 18 BRPM | DIASTOLIC BLOOD PRESSURE: 69 MMHG | WEIGHT: 190 LBS | OXYGEN SATURATION: 98 % | HEIGHT: 67 IN | HEART RATE: 83 BPM | SYSTOLIC BLOOD PRESSURE: 108 MMHG

## 2020-08-15 DIAGNOSIS — G43.909 MIGRAINE: Primary | ICD-10-CM

## 2020-08-15 PROCEDURE — 96365 THER/PROPH/DIAG IV INF INIT: CPT

## 2020-08-15 PROCEDURE — 99283 EMERGENCY DEPT VISIT LOW MDM: CPT

## 2020-08-15 PROCEDURE — 96375 TX/PRO/DX INJ NEW DRUG ADDON: CPT

## 2020-08-15 PROCEDURE — 99282 EMERGENCY DEPT VISIT SF MDM: CPT | Performed by: FAMILY MEDICINE

## 2020-08-15 RX ORDER — DIPHENHYDRAMINE HYDROCHLORIDE 50 MG/ML
25 INJECTION INTRAMUSCULAR; INTRAVENOUS ONCE
Status: COMPLETED | OUTPATIENT
Start: 2020-08-15 | End: 2020-08-15

## 2020-08-15 RX ORDER — ONDANSETRON 2 MG/ML
4 INJECTION INTRAMUSCULAR; INTRAVENOUS ONCE
Status: COMPLETED | OUTPATIENT
Start: 2020-08-15 | End: 2020-08-15

## 2020-08-15 RX ORDER — KETOROLAC TROMETHAMINE 30 MG/ML
30 INJECTION, SOLUTION INTRAMUSCULAR; INTRAVENOUS ONCE
Status: COMPLETED | OUTPATIENT
Start: 2020-08-15 | End: 2020-08-15

## 2020-08-15 RX ORDER — MAGNESIUM SULFATE 1 G/100ML
1 INJECTION INTRAVENOUS ONCE
Status: COMPLETED | OUTPATIENT
Start: 2020-08-15 | End: 2020-08-15

## 2020-08-15 RX ADMIN — MAGNESIUM SULFATE IN DEXTROSE 1 G: 10 INJECTION, SOLUTION INTRAVENOUS at 15:16

## 2020-08-15 RX ADMIN — KETOROLAC TROMETHAMINE 30 MG: 30 INJECTION, SOLUTION INTRAMUSCULAR at 15:10

## 2020-08-15 RX ADMIN — DIPHENHYDRAMINE HYDROCHLORIDE 25 MG: 50 INJECTION INTRAMUSCULAR; INTRAVENOUS at 15:12

## 2020-08-15 RX ADMIN — ONDANSETRON 4 MG: 2 INJECTION INTRAMUSCULAR; INTRAVENOUS at 15:14

## 2020-08-15 RX ADMIN — SODIUM CHLORIDE 1000 ML: 0.9 INJECTION, SOLUTION INTRAVENOUS at 15:09

## 2020-08-15 NOTE — ED PROVIDER NOTES
History  Chief Complaint   Patient presents with    Migraine     According to the patient, she has had a migrainefor the last 2-3 days  Patient reports feeling nausous  no episodes of emesis   This is a 30-year-old female with history of chronic recurrent migraine presented with complain of exacerbation of migraine  Patient states that she has been having headache for past 4 days took her normal medication however still having the pain which prompted this ED visit  She did complain of some nausea sensitivity to light  Patient states that this headache is similar to her previous headache  She is denying any chest pain shortness of breath this time  Denies any abdominal pain at this time  History provided by:  Patient   used: No    Migraine   Severity:  Moderate  Onset quality:  Gradual  Duration:  4 days  Timing:  Constant  Progression:  Partially resolved  Chronicity:  Recurrent  Relieved by: With taking her migraine medication  Worsened by:  Light and noises  Ineffective treatments:  Migration medication  Associated symptoms: headaches and nausea    Associated symptoms: no chest pain, no congestion, no cough, no diarrhea, no fatigue, no fever, no loss of consciousness, no myalgias, no rash, no rhinorrhea, no shortness of breath, no sore throat, no vomiting and no wheezing        Prior to Admission Medications   Prescriptions Last Dose Informant Patient Reported? Taking?    ARIPiprazole (ABILIFY) 2 mg tablet   No No   Sig: Take 1 tablet (2 mg total) by mouth daily   B-D 3CC LUER-DEANN SYR 25GX1" 25G X 1" 3 ML MISC  Self Yes No   Cholecalciferol (VITAMIN D3) 04004 units CAPS  Self Yes No   Sig: Take by mouth once a week   Erenumab-aooe 140 MG/ML SOAJ   No No   Sig: Inject 140 mg under the skin every 30 (thirty) days   SUMAtriptan (IMITREX) 100 mg tablet   No No   Sig: Take 1 tablet (100 mg total) by mouth once as needed for migraine for up to 1 dose   Patient not taking: Reported on 2020   SUMAtriptan (IMITREX) 4 mg/0 5 mL SOAJ   No No   Sig: One injection p r n  Migraine, repeat after 1 hour if needed  Max 2 per day and 3 per week  Hold PO imitrex  Syringe/Needle, Disp, (SYRINGE 3CC/82MI9-1/4") 27G X 1-4" 3 ML MISC  Self No No   Sig: Use for IM injection of ketorolac     buPROPion (WELLBUTRIN XL) 150 mg 24 hr tablet   No No   Sig: Take 1 tablet (150 mg total) by mouth daily   escitalopram (LEXAPRO) 10 mg tablet   No No   Sig: Take 1 tablet (10 mg total) by mouth daily   etonogestrel-ethinyl estradiol (NuvaRing) 0 12-0 015 MG/24HR vaginal ring   No No   Sig: Insert ring for 21 days then remove for one week    ketorolac (TORADOL) 10 mg tablet   No No   Sig: Take 1 tablet (10 mg total) by mouth every 6 (six) hours as needed (migraine) Max 2-3 per week    ketorolac (TORADOL) 60 mg/2 mL   No No   Sig: Inject 1-2 mL (30-60 mg total) into a muscle every 6 (six) hours as needed for moderate pain   melatonin 1 mg  Self No No   Si-2 tabs qhs prn insomnia   naproxen (NAPROSYN) 500 mg tablet  Self No No   Sig: Take 1 tablet (500 mg total) by mouth as needed for headaches   onabotulinumtoxin A (BOTOX) 100 units  Self Yes No   Sig: Inject as directed   phentermine (ADIPEX-P) 37 5 MG tablet   No No   Sig: Take 1 tablet (37 5 mg total) by mouth daily   Patient not taking: Reported on 2020   pregabalin (LYRICA) 25 mg capsule   No No   Sig: Take 1 capsule (25 mg total) by mouth 2 (two) times a day   prochlorperazine (COMPAZINE) 10 mg tablet  Self No No   Sig: Take 1 tablet (10 mg total) by mouth every 6 (six) hours as needed for nausea or vomiting      Facility-Administered Medications Last Administration Doses Remaining   cyanocobalamin injection 1,000 mcg 8/3/2020  8:59 AM    cyanocobalamin injection 1,000 mcg 2020 11:46 AM           Past Medical History:   Diagnosis Date    Abnormal Pap smear of cervix     Anxiety     Arthritis     Depression     Fibromyalgia, primary     Migraine     Vitamin B12 deficiency        Past Surgical History:   Procedure Laterality Date    COLONOSCOPY N/A 5/8/2018    Procedure: COLONOSCOPY;  Surgeon: Dmitriy Brooks MD;  [facility]; Service: Gastroenterology    TONSILLECTOMY      WISDOM TOOTH EXTRACTION         Family History   Problem Relation Age of Onset    Rheum arthritis Mother     Psoriasis Mother     Other Mother     Hypertension Mother     Diabetes unspecified Mother     Sjogren's syndrome Mother     Alcohol abuse Mother     Drug abuse Mother     Anxiety disorder Father     Alcohol abuse Father     Cancer Other     Diabetes Other     Other Other         High blood pressure    Depression Maternal Grandmother      I have reviewed and agree with the history as documented  E-Cigarette/Vaping    E-Cigarette Use Current Some Day User     Start Date 7/1/19     Comments medical marijuana       E-Cigarette/Vaping Substances    Nicotine No     THC Yes     CBD No     Flavoring No     Other No     Unknown No      Social History     Tobacco Use    Smoking status: Never Smoker    Smokeless tobacco: Never Used   Substance Use Topics    Alcohol use: No    Drug use: Yes     Types: Marijuana     Comment: Medical marijuana       Review of Systems   Constitutional: Negative for fatigue and fever  HENT: Negative for congestion, rhinorrhea and sore throat  Eyes: Negative  Respiratory: Negative for cough, shortness of breath and wheezing  Cardiovascular: Negative for chest pain  Gastrointestinal: Positive for nausea  Negative for diarrhea and vomiting  Genitourinary: Negative  Musculoskeletal: Negative  Negative for myalgias  Skin: Negative for rash  Neurological: Positive for headaches  Negative for loss of consciousness  Psychiatric/Behavioral: Negative  Physical Exam  Physical Exam  Vitals signs and nursing note reviewed  Constitutional:       Appearance: She is well-developed     HENT: Head: Normocephalic and atraumatic  Right Ear: External ear normal       Left Ear: External ear normal       Nose: Nose normal       Mouth/Throat:      Pharynx: No oropharyngeal exudate  Eyes:      General: No scleral icterus  Right eye: No discharge  Left eye: No discharge  Conjunctiva/sclera: Conjunctivae normal       Pupils: Pupils are equal, round, and reactive to light  Neck:      Musculoskeletal: Normal range of motion and neck supple  Cardiovascular:      Rate and Rhythm: Normal rate and regular rhythm  Pulmonary:      Effort: Pulmonary effort is normal  No respiratory distress  Breath sounds: Normal breath sounds  No wheezing  Abdominal:      General: Bowel sounds are normal       Palpations: Abdomen is soft  Lymphadenopathy:      Cervical: No cervical adenopathy  Skin:     General: Skin is warm and dry  Capillary Refill: Capillary refill takes less than 2 seconds  Neurological:      Mental Status: She is alert and oriented to person, place, and time     Psychiatric:         Behavior: Behavior normal          Vital Signs  ED Triage Vitals [08/15/20 1447]   Temp Pulse Respirations BP SpO2   -- 98 18 -- 98 %      Temp src Heart Rate Source Patient Position - Orthostatic VS BP Location FiO2 (%)   -- Monitor Lying Right arm --      Pain Score       7           Vitals:    08/15/20 1447   Pulse: 98   Patient Position - Orthostatic VS: Lying         Visual Acuity      ED Medications  Medications   sodium chloride 0 9 % bolus 1,000 mL (1,000 mL Intravenous New Bag 8/15/20 1509)   diphenhydrAMINE (BENADRYL) injection 25 mg (25 mg Intravenous Given 8/15/20 1512)   ketorolac (TORADOL) injection 30 mg (30 mg Intravenous Given 8/15/20 1510)   magnesium sulfate IVPB (premix) SOLN 1 g (1 g Intravenous New Bag 8/15/20 1516)   ondansetron (ZOFRAN) injection 4 mg (4 mg Intravenous Given 8/15/20 1514)       Diagnostic Studies  Results Reviewed     None                 No orders to display              Procedures  Procedures         ED Course  ED Course as of Aug 15 1616   Sat Aug 15, 2020   1614 Patient migraine has resolved  Currently rating her pain 1 out of 10  States she is feeling much better  Patient is requesting to be discharged home  US AUDIT      Most Recent Value   Initial Alcohol Screen: US AUDIT-C    1  How often do you have a drink containing alcohol? 1 Filed at: 08/15/2020 1446   Audit-C Score  1 Filed at: 08/15/2020 1446                  BRUCE/DAST-10      Most Recent Value   How many times in the past year have you    Used an illegal drug or used a prescription medication for non-medical reasons? Never Filed at: 08/15/2020 1446                                MDM      Disposition  Final diagnoses:   Migraine     Time reflects when diagnosis was documented in both MDM as applicable and the Disposition within this note     Time User Action Codes Description Comment    8/15/2020  4:15 PM Anival Agustin 47 [U60 217] Migraine       ED Disposition     ED Disposition Condition Date/Time Comment    Discharge Stable Sat Aug 15, 2020  4:15 PM Kalen Espinoza discharge to home/self care  Follow-up Information     Follow up With Specialties Details Why Contact Info    Janelle Severino DO Family Medicine Schedule an appointment as soon as possible for a visit in 2 days If symptoms worsen [phone]            Patient's Medications   Discharge Prescriptions    No medications on file     No discharge procedures on file      PDMP Review       Value Time User    PDMP Reviewed  Yes 6/24/2020  4:40 PM Janelle Severino DO          ED Provider  Electronically Signed by           Raciel Rocha MD  08/15/20 2582

## 2020-08-17 DIAGNOSIS — F41.1 GENERALIZED ANXIETY DISORDER: ICD-10-CM

## 2020-08-17 RX ORDER — BUPROPION HYDROCHLORIDE 150 MG/1
150 TABLET ORAL DAILY
Qty: 30 TABLET | Refills: 2 | Status: SHIPPED | OUTPATIENT
Start: 2020-08-17 | End: 2020-12-04 | Stop reason: SDUPTHER

## 2020-08-17 NOTE — TELEPHONE ENCOUNTER
Patient calling in  Reviewed below with her  I advised patient to call pharamcy and if unable to , to let us know and we can send Indocin  She is agreeable  Patient is also questioning if [address] would like her to have any imaging to labs drawn to check her vitamins   Please advise

## 2020-08-17 NOTE — TELEPHONE ENCOUNTER
She was at the ED on 8/13 and then again on 8/15 for migraine  Patient calling in to report her migraine pain is more tolerable however, still a constant headache, pain level 3/10 with nausea, light and sound sensitivity  Please advise

## 2020-08-17 NOTE — TELEPHONE ENCOUNTER
I contacted her by phone and nael last week indicating that toradol injection may have been approved  If she did not get this we could proceed with indocin  Thanks

## 2020-08-18 ENCOUNTER — TELEPHONE (OUTPATIENT)
Dept: NEUROLOGY | Facility: CLINIC | Age: 30
End: 2020-08-18

## 2020-08-18 DIAGNOSIS — G43.709 CHRONIC MIGRAINE WITHOUT AURA WITHOUT STATUS MIGRAINOSUS, NOT INTRACTABLE: Primary | ICD-10-CM

## 2020-08-18 RX ORDER — INDOMETHACIN 25 MG/1
CAPSULE ORAL
Qty: 15 CAPSULE | Refills: 0 | Status: SHIPPED | OUTPATIENT
Start: 2020-08-18 | End: 2020-09-14 | Stop reason: SDUPTHER

## 2020-08-18 NOTE — TELEPHONE ENCOUNTER
Patient received 8 mg of decadron IV in the ED  Said that this did not work  As for the toradol injections she did  injections yesterday  Did take a dose yesterday and did not see any relief

## 2020-08-18 NOTE — TELEPHONE ENCOUNTER
Received a call from MALU Gifford that [facility]  Initiated a PA on [facility]  Awaiting determination

## 2020-08-18 NOTE — TELEPHONE ENCOUNTER
There is another encounter as well currently for this patient  To answer the last question, B12 was recently checked and 635 which is good  Vitamin-D is 29 which is slightly low  I think Dr Lindsay Espitia addressed this and her medication list states she is taking vitamin-D  She has been to the ER 3 times now  Head CT has not been done yet  She states her headache started from being in the nail salon and the smells associated with this, and this seems to be the trigger  If I order a brain MRI, she may need to come in for a physical exam/ office visit, but will order now and see what happens  Please assist with scheduling  Thanks

## 2020-08-18 NOTE — TELEPHONE ENCOUNTER
Patient calling to say that she has had a migraine for the last week and has been in the ED multiple times but is still unable to break the migraine  Patient has received the migraine cocktail in the ED and toradol po/injections  Pain is located on the left side  Pain is described as stabbing and pounding  Pain scale is 7/10  Symptoms include nausea and light/sound sensitivity  Abortives tried: sumatriptan po/injection, migraine cocktail, toradol po/injection  Preventives: Aimovig and Botox    Patient has not taken any medication today    Patient was given decadron in ED, Allergic to depakote, and has not tried zyprexa  Reviewing previous notes the next step looks to be indocin  Please advise          [phone] Redwood to leave a detailed message

## 2020-08-18 NOTE — TELEPHONE ENCOUNTER
If she was given decadron and has it, please try that now  To clarify- does she have toradol injections from the pharmacy or are you referring to just the ED toradol? Thanks

## 2020-08-19 ENCOUNTER — DOCUMENTATION (OUTPATIENT)
Dept: NEUROLOGY | Facility: CLINIC | Age: 30
End: 2020-08-19

## 2020-08-19 ENCOUNTER — TELEPHONE (OUTPATIENT)
Dept: NEUROLOGY | Facility: CLINIC | Age: 30
End: 2020-08-19

## 2020-08-19 NOTE — PROGRESS NOTES
General  08/19/2020  8:14 AM  Gissell Loredo MA  CARE COORDINATION  -    Note     BOTOX 200 UNITS (VISIT# 3) - AUTH# [id number]  4 VISITS, AV- 04/08/2020 TILL 04/08/2021    STOCK

## 2020-08-19 NOTE — TELEPHONE ENCOUNTER
Received letter on 8/19/20 from [employer] regarding approval of Ketorolac  Scanned document into chart

## 2020-08-21 ENCOUNTER — TELEMEDICINE (OUTPATIENT)
Dept: BEHAVIORAL/MENTAL HEALTH CLINIC | Facility: CLINIC | Age: 30
End: 2020-08-21
Payer: COMMERCIAL

## 2020-08-21 DIAGNOSIS — F33.1 DEPRESSION, MAJOR, RECURRENT, MODERATE (HCC): Primary | Chronic | ICD-10-CM

## 2020-08-21 DIAGNOSIS — F41.1 GENERALIZED ANXIETY DISORDER: Chronic | ICD-10-CM

## 2020-08-21 PROCEDURE — 90834 PSYTX W PT 45 MINUTES: CPT | Performed by: SOCIAL WORKER

## 2020-08-21 NOTE — PSYCH
Virtual Regular Visit      Assessment/Plan:    Problem List Items Addressed This Visit        Other    Generalized anxiety disorder (Chronic)    Depression, major, recurrent, moderate ([facility] [address] ) - Primary (Chronic)               Reason for visit is Reason for visit is Behavioral Health session, conducted through video, due to COVID-19 precautions  Jonathan Eliseo has verbalized a preference to continue with virtual sessions at this time  Jonathan Gomes has been offered in-person sessions and has declined  Encounter provider APARNA Chavez    Provider located at [address] [place] [address] [address]  [facility] [id number]      Recent Visits  No visits were found meeting these conditions  Showing recent visits within past 7 days and meeting all other requirements     Future Appointments  No visits were found meeting these conditions  Showing future appointments within next 150 days and meeting all other requirements        The patient was identified by name and date of birth  Kaden Holman was informed that this is a telemedicine visit and that the visit is being conducted through [employer]  My office door was closed  No one else was in the room  She acknowledged consent and understanding of privacy and security of the video platform  The patient has agreed to participate and understands they can discontinue the visit at any time  Patient is aware this is a billable service  Anatoly Raines is a 35 y o  female  DATA: Met with Stephanie for scheduled individual session  "I have had a migraine almost every day for the last two weeks " We briefly discussed the ways that Giovanna Brenda is working on increasing her healthy lifestyle activities  She reports getting outside as much as she is able  She states that she is working on eating a balanced diet and increasing her movement as much as she is physically able  Stephanie discussed her new relationship   She states, "Remember how I said that I didn't want to start dating? Well, that got blown out of the water " [place] states that "he is very different " She states that he (Anil Doss) is very mature and is not demanding about her time and attention  She states that he has encouraged her to spend time with her friends  He has met her best friend and her best friend's family  [place] has met his family at a [facility]  She states that his family is very close, which is important to her  She said that he wants to do things with her, but he wants them to have their own lives as well  Stephanie states, "There is a down-side " She states that he lives with his ex-girlfriend (who has a 3-y o  son)  Anil Doss has stated to Stephanie's friend that he would choose to be with [place], over maintaining a relationship with his son  We discussed his decision-making process and the health of the relationship with his ex-girlfriend  We discussed him having to make choices for his overall happiness--for the long-term  He is considering kicking her out of the home this weekend  [place] disclosed that her boyfriend is transgender  She states that it is a very different experience for her  She has dated men and women; however, this is the first trans-person she has dated  She states that he is currently not able to engage in any gender affirmation treatment, as he does not have insurance  [place] states that she is trying to be supportive of him and she is glad that she has this therapeutic venue to discuss any issues that arise  She states that her mother is aware that he is trans, and she is very accepting  ASSESSMENT: [place]man presents with a primarily euthymic mood  Her affect is normal and mood-congruent  [place] exhibits a positive therapeutic rapport with this clinician  [place] appears to have normal insight and judgment  [place] continues to exhibit willingness to work on treatment goals and objectives         PLAN: [place] will return in two weeks for the next scheduled session  Between sessions, Tatum Page will continue to work on being active as much as her body will allow  She will continue to work on building and maintaining open communication with her boyfriend and will report back during the next session re: successes and barriers  At the next session, this clinician will use client-centered therapy, mindfulness-based strategies, DBT-informed skills and solution-focused therapy to address her mood regulation and anxiety management, in an effort to assist Tatum Page with meeting treatment goals  HPI     Past Medical History:   Diagnosis Date    Abnormal Pap smear of cervix     Anxiety     Arthritis     Depression     Fibromyalgia, primary     Migraine     Vitamin B12 deficiency        Past Surgical History:   Procedure Laterality Date    COLONOSCOPY N/A 5/8/2018    Procedure: COLONOSCOPY;  Surgeon: Dmitriy Brooks MD;  Location: [facility]; Service: Gastroenterology    TONSILLECTOMY      WISDOM TOOTH EXTRACTION         Current Outpatient Medications   Medication Sig Dispense Refill    ARIPiprazole (ABILIFY) 2 mg tablet Take 1 tablet (2 mg total) by mouth daily 30 tablet 2    B-D 3CC LUER-DEANN SYR 25GX1" 25G X 1" 3 ML MISC       buPROPion (WELLBUTRIN XL) 150 mg 24 hr tablet Take 1 tablet (150 mg total) by mouth daily 30 tablet 2    Cholecalciferol (VITAMIN D3) 00772 units CAPS Take by mouth once a week      Erenumab-aooe 140 MG/ML SOAJ Inject 140 mg under the skin every 30 (thirty) days 1 pen 11    escitalopram (LEXAPRO) 10 mg tablet Take 1 tablet (10 mg total) by mouth daily 30 tablet 2    etonogestrel-ethinyl estradiol (NuvaRing) 0 12-0 015 MG/24HR vaginal ring Insert ring for 21 days then remove for one week  3 each 3    indomethacin (INDOCIN) 25 mg capsule 1-2 tabs BID PRN migraine WITH FOOD  Hold toradol   15 capsule 0    ketorolac (TORADOL) 10 mg tablet Take 1 tablet (10 mg total) by mouth every 6 (six) hours as needed (migraine) Max 2-3 per week  10 tablet 0    ketorolac (TORADOL) 60 mg/2 mL Inject 1-2 mL (30-60 mg total) into a muscle every 6 (six) hours as needed for moderate pain 6 mL 1    melatonin 1 mg 1-2 tabs qhs prn insomnia 60 tablet 2    naproxen (NAPROSYN) 500 mg tablet Take 1 tablet (500 mg total) by mouth as needed for headaches 60 tablet 1    onabotulinumtoxin A (BOTOX) 100 units Inject as directed      phentermine (ADIPEX-P) 37 5 MG tablet Take 1 tablet (37 5 mg total) by mouth daily (Patient not taking: Reported on 8/11/2020) 30 tablet 1    pregabalin (LYRICA) 25 mg capsule Take 1 capsule (25 mg total) by mouth 2 (two) times a day 60 capsule 2    prochlorperazine (COMPAZINE) 10 mg tablet Take 1 tablet (10 mg total) by mouth every 6 (six) hours as needed for nausea or vomiting 30 tablet 0    SUMAtriptan (IMITREX) 100 mg tablet Take 1 tablet (100 mg total) by mouth once as needed for migraine for up to 1 dose (Patient not taking: Reported on 8/13/2020) 9 tablet 2    SUMAtriptan (IMITREX) 4 mg/0 5 mL SOAJ One injection p r n  Migraine, repeat after 1 hour if needed  Max 2 per day and 3 per week  Hold PO imitrex  4 mL 0    Syringe/Needle, Disp, (SYRINGE 3CC/75RT0-3/4") 27G X 1-1/4" 3 ML MISC Use for IM injection of ketorolac  4 each 0    Ubrogepant (UBRELVY) 50 MG tablet 1-2 tabs at migraine onset, repeat in 2 hours if needed  Max 2 per day  Hold triptan   10 tablet 0     Current Facility-Administered Medications   Medication Dose Route Frequency Provider Last Rate Last Dose    cyanocobalamin injection 1,000 mcg  1,000 mcg Intramuscular Q30 Days Gabriele Goode,    1,000 mcg at 08/03/20 8682    cyanocobalamin injection 1,000 mcg  1,000 mcg Intramuscular Q14 Days Gabriele Goode DO   1,000 mcg at 05/18/20 1146        Allergies   Allergen Reactions    Depakote Er  [Valproic Acid]      Other reaction(s): dilated pupils, "schizi"    Desvenlafaxine      Other reaction(s): state of confusion       Review of Systems    Video Exam    There were no vitals filed for this visit  Physical Exam     I spent 50 minutes directly with the patient during this visit      VIRTUAL VISIT [id number] Marcello acknowledges that she has consented to an online visit or consultation  She understands that the online visit is based solely on information provided by her, and that, in the absence of a face-to-face physical evaluation by the physician, the diagnosis she receives is both limited and provisional in terms of accuracy and completeness  This is not intended to replace a full medical face-to-face evaluation by the physician  Marlowe Paget understands and accepts these terms

## 2020-08-24 NOTE — TELEPHONE ENCOUNTER
Called patient  Made her aware of below  She states she did try Indocin and would like this to be used as her abortive as it was effective for her       FYI

## 2020-08-27 ENCOUNTER — TELEMEDICINE (OUTPATIENT)
Dept: PSYCHIATRY | Facility: CLINIC | Age: 30
End: 2020-08-27
Payer: COMMERCIAL

## 2020-08-27 DIAGNOSIS — F41.1 GENERALIZED ANXIETY DISORDER: ICD-10-CM

## 2020-08-27 DIAGNOSIS — G47.01 INSOMNIA DUE TO MEDICAL CONDITION: ICD-10-CM

## 2020-08-27 DIAGNOSIS — F33.1 DEPRESSION, MAJOR, RECURRENT, MODERATE (HCC): Primary | Chronic | ICD-10-CM

## 2020-08-27 PROCEDURE — 99213 OFFICE O/P EST LOW 20 MIN: CPT | Performed by: PSYCHIATRY & NEUROLOGY

## 2020-08-27 PROCEDURE — 1036F TOBACCO NON-USER: CPT | Performed by: PSYCHIATRY & NEUROLOGY

## 2020-08-27 PROCEDURE — 90833 PSYTX W PT W E/M 30 MIN: CPT | Performed by: PSYCHIATRY & NEUROLOGY

## 2020-08-27 RX ORDER — ALPRAZOLAM 0.5 MG/1
0.5 TABLET ORAL DAILY PRN
Qty: 30 TABLET | Refills: 0 | Status: SHIPPED | OUTPATIENT
Start: 2020-08-27 | End: 2021-02-25 | Stop reason: SDUPTHER

## 2020-08-27 RX ORDER — ESCITALOPRAM OXALATE 10 MG/1
10 TABLET ORAL DAILY
Qty: 30 TABLET | Refills: 2 | Status: SHIPPED | OUTPATIENT
Start: 2020-08-27 | End: 2020-09-14 | Stop reason: SDUPTHER

## 2020-08-27 NOTE — PSYCH
Virtual Regular Visit      Assessment/Plan:    Problem List Items Addressed This Visit        Other    Depression, major, recurrent, moderate (HCC) - Primary (Chronic)    Relevant Medications    escitalopram (LEXAPRO) 10 mg tablet    ALPRAZolam (XANAX) 0 5 mg tablet    Generalized anxiety disorder (Chronic)    Relevant Medications    escitalopram (LEXAPRO) 10 mg tablet    ALPRAZolam (XANAX) 0 5 mg tablet    Insomnia due to medical condition               Reason for visit is   Chief Complaint   Patient presents with    Virtual Regular Visit        Encounter provider Wolf Douglas MD    Provider located at [address] [place] [address] [address]  [facility] [id number]      Recent Visits  No visits were found meeting these conditions  Showing recent visits within past 7 days and meeting all other requirements     Today's Visits  Date Type Provider Dept   08/27/20 Telemedicine Wolf Douglas MD [facility] today's visits and meeting all other requirements     Future Appointments  No visits were found meeting these conditions  Showing future appointments within next 150 days and meeting all other requirements        The patient was identified by name and date of birth  [facility] was informed that this is a telemedicine visit and that the visit is being conducted through [employer]  My office door was closed  No one else was in the room  She acknowledged consent and understanding of privacy and security of the video platform  The patient has agreed to participate and understands they can discontinue the visit at any time  Patient is aware this is a billable service  Emma Bautista is a 35 y o  female MDD and BESSY  Patient stated since last seen she remains compliant with medications and denies side effects   She has been having  more anxiety due to increase frequency of migraine headaches and was taken off Imitrex and started on indomethacin which has helped  No other health changes  She gets year Pap smear due to being HPV positive  She stated she will like to have again Alprazolam 0 5 mg po qd prn anxiety for a short term  No other concerns expressed  She did reported that her neurologist had referred her for neuropsychological testing to assess her memory and attention difficulties  Will follow up on test results on out next visit  Patient agrees to schedule follow up in 3 months  HPI     Past Medical History:   Diagnosis Date    Abnormal Pap smear of cervix     Anxiety     Arthritis     Depression     Fibromyalgia, primary     Migraine     Vitamin B12 deficiency        Past Surgical History:   Procedure Laterality Date    COLONOSCOPY N/A 5/8/2018    Procedure: COLONOSCOPY;  Surgeon: Hernán Santizo MD;  Location: [facility] GI [facility]; Service: Gastroenterology    TONSILLECTOMY      WISDOM TOOTH EXTRACTION         Current Outpatient Medications   Medication Sig Dispense Refill    ALPRAZolam (XANAX) 0 5 mg tablet Take 1 tablet (0 5 mg total) by mouth daily as needed for anxiety 30 tablet 0    ARIPiprazole (ABILIFY) 2 mg tablet Take 1 tablet (2 mg total) by mouth daily 30 tablet 2    B-D 3CC LUER-DEANN SYR 25GX1" 25G X 1" 3 ML MISC       buPROPion (WELLBUTRIN XL) 150 mg 24 hr tablet Take 1 tablet (150 mg total) by mouth daily 30 tablet 2    Cholecalciferol (VITAMIN D3) 62975 units CAPS Take by mouth once a week      Erenumab-aooe 140 MG/ML SOAJ Inject 140 mg under the skin every 30 (thirty) days 1 pen 11    escitalopram (LEXAPRO) 10 mg tablet Take 1 tablet (10 mg total) by mouth daily 30 tablet 2    etonogestrel-ethinyl estradiol (NuvaRing) 0 12-0 015 MG/24HR vaginal ring Insert ring for 21 days then remove for one week  3 each 3    indomethacin (INDOCIN) 25 mg capsule 1-2 tabs BID PRN migraine WITH FOOD  Hold toradol   15 capsule 0    ketorolac (TORADOL) 10 mg tablet Take 1 tablet (10 mg total) by mouth every 6 (six) hours as needed (migraine) Max 2-3 per week  10 tablet 0    ketorolac (TORADOL) 60 mg/2 mL Inject 1-2 mL (30-60 mg total) into a muscle every 6 (six) hours as needed for moderate pain 6 mL 1    melatonin 1 mg 1-2 tabs qhs prn insomnia 60 tablet 2    naproxen (NAPROSYN) 500 mg tablet Take 1 tablet (500 mg total) by mouth as needed for headaches 60 tablet 1    onabotulinumtoxin A (BOTOX) 100 units Inject as directed      pregabalin (LYRICA) 25 mg capsule Take 1 capsule (25 mg total) by mouth 2 (two) times a day 60 capsule 2    prochlorperazine (COMPAZINE) 10 mg tablet Take 1 tablet (10 mg total) by mouth every 6 (six) hours as needed for nausea or vomiting 30 tablet 0    SUMAtriptan (IMITREX) 4 mg/0 5 mL SOAJ One injection p r n  Migraine, repeat after 1 hour if needed  Max 2 per day and 3 per week  Hold PO imitrex  4 mL 0    Syringe/Needle, Disp, (SYRINGE 3CC/92US2-9/4") 27G X 1-1/4" 3 ML MISC Use for IM injection of ketorolac  4 each 0    Ubrogepant (UBRELVY) 50 MG tablet 1-2 tabs at migraine onset, repeat in 2 hours if needed  Max 2 per day  Hold triptan   10 tablet 0     Current Facility-Administered Medications   Medication Dose Route Frequency Provider Last Rate Last Dose    cyanocobalamin injection 1,000 mcg  1,000 mcg Intramuscular Q30 Days Barnetta Sprinkles, DO   1,000 mcg at 08/03/20 9473    cyanocobalamin injection 1,000 mcg  1,000 mcg Intramuscular Q14 Days Barnetta Sprinkles, DO   1,000 mcg at 05/18/20 1146        Allergies   Allergen Reactions    Depakote Er  [Valproic Acid]      Other reaction(s): dilated pupils, "schizi"    Desvenlafaxine      Other reaction(s): state of confusion       Review of Systems     Mood Anxiety and Depression   Behavior Normal    Thought Content Disturbing Thoughts, Feelings   General Emotional Problems and Decreased Functioning   Personality Normal   Other Psych Symptoms Normal   Constitutional Negative   ENT Negative   Cardiovascular Negative   Respiratory Negative   Gastrointestinal Negative   Genitourinary Negative   Musculoskeletal Negative   Integumentary Negative   Neurological Negative   Endocrine Normal    Other Symptoms Normal              Laboratory Results: No results found for this or any previous visit      Substance Abuse History:  Social History     Substance and Sexual Activity   Drug Use Yes    Types: Marijuana    Comment: Medical marijuana       Family Psychiatric History:   Family History   Problem Relation Age of Onset    Rheum arthritis Mother     Psoriasis Mother     Other Mother     Hypertension Mother     Diabetes unspecified Mother     Sjogren's syndrome Mother     Alcohol abuse Mother     Drug abuse Mother     Anxiety disorder Father     Alcohol abuse Father     Cancer Other     Diabetes Other     Other Other         High blood pressure    Depression Maternal Grandmother        The following portions of the patient's history were reviewed and updated as appropriate: allergies, current medications, past family history, past medical history, past social history, past surgical history and problem list     Social History     Socioeconomic History    Marital status: Single     Spouse name: Not on file    Number of children: 0    Years of education: 15    Highest education level: Not on file   Occupational History    Occupation: Unemployed   Social Needs    Financial resource strain: Very hard    Food insecurity     Worry: Often true     Inability: Never true    Transportation needs     Medical: No     Non-medical: No   Tobacco Use    Smoking status: Never Smoker    Smokeless tobacco: Never Used   Substance and Sexual Activity    Alcohol use: No    Drug use: Yes     Types: Marijuana     Comment: Medical marijuana    Sexual activity: Yes     Partners: Male     Comment: Nuva ring   Lifestyle    Physical activity     Days per week: 0 days     Minutes per session: Not on file    Stress: Not on file Relationships    Social connections     Talks on phone: Not on file     Gets together: Not on file     Attends Taoist service: Not on file     Active member of club or organization: Not on file     Attends meetings of clubs or organizations: Not on file     Relationship status: Not on file    Intimate partner violence     Fear of current or ex partner: Not on file     Emotionally abused: Not on file     Physically abused: Not on file     Forced sexual activity: Not on file   Other Topics Concern    Not on file   Social History Narrative    Home:  Living with mom and MGM        Education:    Pt denies any h/o learning disability Dxs but admits to having great difficulty in math requiring a   She reached childhood milestones on time as far as he knows  Graduated HS 2009    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 college art classes--she stopped due to anxiety from dissatisfaction with her classes--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 felt they were telling her what to create  On reflection, she feels it was moreso her anxiety as the cause of her leaving school, and she was using the other reason as an excuse so she would not have to admit to anxiety at that time  She is now very open about her anxiety and does not mind that I have this information in the general social section of her chart  Social History     Social History Narrative    Home:  Living with mom and MGM        Education:    Pt denies any h/o learning disability Dxs but admits to having great difficulty in math requiring a   She reached childhood milestones on time as far as he knows  Graduated HS 2009    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 college art classes--she stopped due to anxiety from dissatisfaction with her classes--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 felt they were telling her what to create   On reflection, she feels it was moreso her anxiety as the cause of her leaving school, and she was using the other reason as an excuse so she would not have to admit to anxiety at that time  She is now very open about her anxiety and does not mind that I have this information in the general social section of her chart  Objective:       Mental status:  Appearance calm and cooperative , adequate hygiene and grooming and good eye contact    Mood dysphoric, depressed and anxious   Affect affect was constricted   Speech a normal rate and fluent   Thought Processes coherent/organized and normal thought processes   Hallucinations no hallucinations present    Thought Content no delusions   Abnormal Thoughts no suicidal thoughts  and no homicidal thoughts    Orientation  oriented to person and place and time   Remote Memory short term memory intact and long term memory intact   Attention Span concentration intact   Intellect Appears to be of Average Intelligence   Insight Limited insight   Judgement judgment was limited   Muscle Strength n/a   Language no difficulty naming common objects, no difficulty repeating a phrase  and no difficulty writing a sentence    Fund of Knowledge displays adequate knowledge of current events, adequate fund of knowledge regarding past history and adequate fund of knowledge regarding vocabulary    Pain none   Pain Scale 0       Assessment/Plan:       Diagnoses and all orders for this visit:    Depression, major, recurrent, moderate (HCC)    Generalized anxiety disorder  -     escitalopram (LEXAPRO) 10 mg tablet; Take 1 tablet (10 mg total) by mouth daily  -     ALPRAZolam (XANAX) 0 5 mg tablet;  Take 1 tablet (0 5 mg total) by mouth daily as needed for anxiety    Insomnia due to medical condition            Treatment Recommendations- Risks Benefits      Immediate Medical/Psychiatric/Psychotherapy Treatments and Any Precautions: continue current treatment     Risks, Benefits And Possible Side Effects Of Medications:  {PSYCH RISK, BENEFITS AND POSSIBLE SIDE EFFECTS (Optional):02935    Controlled Medication Discussion: Discussed with patient Black Box warning on concurrent use of benzodiazepines and opioid medications including sedation, respiratory depression, coma and death  Patient understands the risk of treatment with benzodiazepines in addition to opioids and wants to continue taking those medications  , Discussed with patient the risks of sedation, respiratory depression, impairment of ability to drive and potential for abuse and addiction related to treatment with benzodiazepine medications  The patient understands risk of treatment with benzodiazepine medications, agrees to not drive if feels impaired and agrees to take medications as prescribed  and The patient has been filling controlled prescriptions on time as prescribed to [facility]       Psychotherapy Provided:     Individual psychotherapy provided: Yes  Counseling was provided during the session today for 16 minutes  Medications, treatment progress and treatment plan reviewed with Cruz  Medication changes discussed with Cruz  Medication education provided to Rina cesar  Coping strategies including compliance with medications, maintain healthy diet, maintain heathy sleeping hygiene and maintain positive attitude reviewed with Cruz  Importance of medication and treatment compliance reviewed with Cruz  Educated on importance of medication and treatment compliance  Supportive therapy provided  I spent 30 minutes directly with the patient during this visit      VIRTUAL VISIT [id number] Marcello acknowledges that she has consented to an online visit or consultation   She understands that the online visit is based solely on information provided by her, and that, in the absence of a face-to-face physical evaluation by the physician, the diagnosis she receives is both limited and provisional in terms of accuracy and completeness  This is not intended to replace a full medical face-to-face evaluation by the physician  Darshan Currie understands and accepts these terms

## 2020-08-28 NOTE — PROGRESS NOTES
[facility]        NAME: Kaden Holman is a 35 y o  female  : 1990    MRN: [id number]  DATE: 2020  TIME: 1:08 PM    Assessment and Plan   Acute nonintractable headache, unspecified headache type [R51]  1  Acute nonintractable headache, unspecified headache type  ketorolac (TORADOL) injection 60 mg         Patient Instructions     F/U with PCP and neurologist  Follow up with PCP in 3-5 days  Proceed to  ER if symptoms worsen  Chief Complaint     Chief Complaint   Patient presents with    Headache     c/o migraine x 2 days     History of Present Illness       Headache    This is a new problem  The current episode started yesterday  The problem occurs constantly  The problem has been gradually worsening  The pain is located in the right unilateral region  The pain does not radiate  The pain quality is similar to prior headaches  The quality of the pain is described as throbbing  The pain is moderate  Associated symptoms include nausea, phonophobia and photophobia  Pertinent negatives include no abdominal pain, abnormal behavior, anorexia, back pain, blurred vision, coughing, dizziness, drainage, ear pain, eye pain, eye redness, eye watering, facial sweating, fever, hearing loss, insomnia, loss of balance, muscle aches, neck pain, numbness, rhinorrhea, scalp tenderness, seizures, sinus pressure, sore throat, swollen glands, tingling, tinnitus, visual change, vomiting, weakness or weight loss  Nothing aggravates the symptoms  Treatments tried: toradol po but patient ran out of medicine yesterday  The treatment provided no relief  There is no history of cancer, cluster headaches, hypertension, immunosuppression, migraine headaches, migraines in the family, obesity, pseudotumor cerebri, recent head traumas, sinus disease or TMJ  Review of Systems   Review of Systems   Constitutional: Negative for fever and weight loss     HENT: Negative for ear pain, hearing loss, rhinorrhea, sinus pressure, sore throat and tinnitus  Eyes: Positive for photophobia  Negative for blurred vision, pain and redness  Respiratory: Negative for cough  Gastrointestinal: Positive for nausea  Negative for abdominal pain, anorexia and vomiting  Musculoskeletal: Negative for back pain and neck pain  Neurological: Positive for headaches  Negative for dizziness, tingling, seizures, weakness, numbness and loss of balance  Psychiatric/Behavioral: The patient does not have insomnia            Current Medications       Current Outpatient Medications:     B-D 3CC LUER-DEANN SYR 25GX1" 25G X 1" 3 ML MISC, , Disp: , Rfl:     Cholecalciferol (VITAMIN D3) 26113 units CAPS, Take by mouth once a week, Disp: , Rfl:     Erenumab-aooe 140 MG/ML SOAJ, Inject 140 mg under the skin every 30 (thirty) days, Disp: 1 pen, Rfl: 11    melatonin 1 mg, 1-2 tabs qhs prn insomnia, Disp: 60 tablet, Rfl: 2    naproxen (NAPROSYN) 500 mg tablet, Take 1 tablet (500 mg total) by mouth as needed for headaches, Disp: 60 tablet, Rfl: 1    onabotulinumtoxin A (BOTOX) 100 units, Inject as directed, Disp: , Rfl:     pregabalin (LYRICA) 25 mg capsule, Take 1 capsule (25 mg total) by mouth 2 (two) times a day, Disp: 60 capsule, Rfl: 2    prochlorperazine (COMPAZINE) 10 mg tablet, Take 1 tablet (10 mg total) by mouth every 6 (six) hours as needed for nausea or vomiting, Disp: 30 tablet, Rfl: 0    Syringe/Needle, Disp, (SYRINGE 3CC/96XG9-0/4") 27G X 1-1/4" 3 ML MISC, Use for IM injection of ketorolac , Disp: 4 each, Rfl: 0    ALPRAZolam (XANAX) 0 5 mg tablet, Take 1 tablet (0 5 mg total) by mouth daily as needed for anxiety, Disp: 30 tablet, Rfl: 0    ARIPiprazole (ABILIFY) 2 mg tablet, Take 1 tablet (2 mg total) by mouth daily, Disp: 30 tablet, Rfl: 2    buPROPion (WELLBUTRIN XL) 150 mg 24 hr tablet, Take 1 tablet (150 mg total) by mouth daily, Disp: 30 tablet, Rfl: 2    escitalopram (LEXAPRO) 10 mg tablet, Take 1 tablet (10 mg total) by mouth daily, Disp: 30 tablet, Rfl: 2    etonogestrel-ethinyl estradiol (NuvaRing) 0 12-0 015 MG/24HR vaginal ring, Insert ring for 21 days then remove for one week , Disp: 3 each, Rfl: 3    indomethacin (INDOCIN) 25 mg capsule, 1-2 tabs BID PRN migraine WITH FOOD  Hold toradol , Disp: 15 capsule, Rfl: 0    ketorolac (TORADOL) 10 mg tablet, Take 1 tablet (10 mg total) by mouth every 6 (six) hours as needed (migraine) Max 2-3 per week , Disp: 10 tablet, Rfl: 0    ketorolac (TORADOL) 60 mg/2 mL, Inject 1-2 mL (30-60 mg total) into a muscle every 6 (six) hours as needed for moderate pain, Disp: 6 mL, Rfl: 1    SUMAtriptan (IMITREX) 4 mg/0 5 mL SOAJ, One injection p r n  Migraine, repeat after 1 hour if needed  Max 2 per day and 3 per week  Hold PO imitrex  , Disp: 4 mL, Rfl: 0    Ubrogepant (UBRELVY) 50 MG tablet, 1-2 tabs at migraine onset, repeat in 2 hours if needed  Max 2 per day   Hold triptan , Disp: 10 tablet, Rfl: 0    Current Facility-Administered Medications:     cyanocobalamin injection 1,000 mcg, 1,000 mcg, Intramuscular, Q30 Days, [facility], DO, 1,000 mcg at 08/03/20 7265    cyanocobalamin injection 1,000 mcg, 1,000 mcg, Intramuscular, Q14 Days, [facility], DO, 1,000 mcg at 05/18/20 1146    Current Allergies     Allergies as of 07/27/2020 - Reviewed 07/27/2020   Allergen Reaction Noted    Depakote er  [valproic acid]  10/18/2017    Desvenlafaxine  11/26/2012            The following portions of the patient's history were reviewed and updated as appropriate: allergies, current medications, past family history, past medical history, past social history, past surgical history and problem list      Past Medical History:   Diagnosis Date    Abnormal Pap smear of cervix     Anxiety     Arthritis     Depression     Fibromyalgia, primary     Migraine     Vitamin B12 deficiency        Past Surgical History:   Procedure Laterality Date    COLONOSCOPY N/A 5/8/2018    Procedure: COLONOSCOPY; Surgeon: Sylvain Boateng MD;  Location: [facility] GI LAB; Service: Gastroenterology    TONSILLECTOMY      WISDOM TOOTH EXTRACTION         Family History   Problem Relation Age of Onset    Rheum arthritis Mother     Psoriasis Mother     Other Mother     Hypertension Mother     Diabetes unspecified Mother     Sjogren's syndrome Mother     Alcohol abuse Mother     Drug abuse Mother     Anxiety disorder Father     Alcohol abuse Father     Cancer Other     Diabetes Other     Other Other         High blood pressure    Depression Maternal Grandmother          Medications have been verified  Objective   /90   Pulse 88   Temp 97 8 °F (36 6 °C) (Temporal)   Resp 20   Ht 5' 3" (1 6 m)   Wt 85 3 kg (188 lb)   LMP 06/23/2020 (LMP Unknown)   SpO2 99%   BMI 33 30 kg/m²        Physical Exam     Physical Exam  Constitutional:       Appearance: Normal appearance  Cardiovascular:      Rate and Rhythm: Normal rate and regular rhythm  Heart sounds: Normal heart sounds  No murmur  No friction rub  No gallop  Pulmonary:      Effort: Pulmonary effort is normal  No respiratory distress  Breath sounds: Normal breath sounds  No wheezing, rhonchi or rales  Abdominal:      General: Abdomen is flat  Palpations: Abdomen is soft  Neurological:      Mental Status: She is alert  GCS: GCS eye subscore is 4  GCS verbal subscore is 5  GCS motor subscore is 6  Cranial Nerves: Cranial nerves are intact  Sensory: Sensation is intact  Motor: Motor function is intact  Coordination: Coordination is intact        Deep Tendon Reflexes: Reflexes normal

## 2020-09-01 ENCOUNTER — OFFICE VISIT (OUTPATIENT)
Dept: FAMILY MEDICINE CLINIC | Facility: CLINIC | Age: 30
End: 2020-09-01
Payer: COMMERCIAL

## 2020-09-01 VITALS
BODY MASS INDEX: 30.26 KG/M2 | TEMPERATURE: 97.4 F | WEIGHT: 192.8 LBS | DIASTOLIC BLOOD PRESSURE: 88 MMHG | SYSTOLIC BLOOD PRESSURE: 128 MMHG | HEIGHT: 67 IN

## 2020-09-01 DIAGNOSIS — E53.8 B12 DEFICIENCY: Primary | ICD-10-CM

## 2020-09-01 DIAGNOSIS — G43.709 CHRONIC MIGRAINE WITHOUT AURA WITHOUT STATUS MIGRAINOSUS, NOT INTRACTABLE: ICD-10-CM

## 2020-09-01 DIAGNOSIS — E53.8 VITAMIN B12 DEFICIENCY: ICD-10-CM

## 2020-09-01 PROCEDURE — 99213 OFFICE O/P EST LOW 20 MIN: CPT | Performed by: FAMILY MEDICINE

## 2020-09-01 PROCEDURE — 1036F TOBACCO NON-USER: CPT | Performed by: FAMILY MEDICINE

## 2020-09-01 PROCEDURE — 3725F SCREEN DEPRESSION PERFORMED: CPT | Performed by: FAMILY MEDICINE

## 2020-09-01 RX ORDER — CYANOCOBALAMIN 1000 UG/ML
1000 INJECTION INTRAMUSCULAR; SUBCUTANEOUS
Status: SHIPPED | OUTPATIENT
Start: 2020-09-01

## 2020-09-01 RX ADMIN — CYANOCOBALAMIN 1000 MCG: 1000 INJECTION INTRAMUSCULAR; SUBCUTANEOUS at 11:29

## 2020-09-01 NOTE — PROGRESS NOTES
Assessment/Plan:  Patient will continue to follow with psychiatry as well as counseling  Patient have vitamin B12 injection at this time  Patient will continue with oral supplementation with 1000 mcg daily  Patient will have follow-up vitamin B12 level in 3-6 months  Patient will follow with Neurology and will be going for MRI on Friday  Diagnoses and all orders for this visit:    B12 deficiency  -     cyanocobalamin injection 1,000 mcg  -     Vitamin B12; Future    Chronic migraine without aura without status migrainosus, not intractable    Vitamin B12 deficiency            Subjective:        Patient ID: Arcelia Bloom is a 35 y o  female  Patient follow-up on vitamin B12 deficiency and chronic migraines  Patient just went through bouts multiple migraines over the past few weeks  Patient was in the hospital/ER multiple times  Patient still getting daily headaches at this time  Patient going for MRI this Friday  Patient is seeing Neurology  Patient does get anxiety with migraines  Patient is talking with psychiatrist appropriately        The following portions of the patient's history were reviewed and updated as appropriate: allergies, current medications, past family history, past medical history, past social history, past surgical history and problem list       Review of Systems   Constitutional: Negative  HENT: Negative  Eyes: Negative  Respiratory: Negative  Cardiovascular: Negative  Gastrointestinal: Negative  Endocrine: Negative  Genitourinary: Negative  Musculoskeletal: Negative  Skin: Negative  Allergic/Immunologic: Negative  Neurological: Positive for headaches  Hematological: Negative  Psychiatric/Behavioral: The patient is nervous/anxious  Objective:        Depression Screening and Follow-up Plan: Patient's depression screening was positive with a PHQ-2 score of 6  Their PHQ-9 score was 19   Continue regular follow-up with their mental health provider who is managing their mental health condition(s)  /88 (BP Location: Right arm, Patient Position: Sitting, Cuff Size: Standard)   Temp (!) 97 4 °F (36 3 °C) (Tympanic)   Ht 5' 7" (1 702 m)   Wt 87 5 kg (192 lb 12 8 oz)   BMI 30 20 kg/m²          Physical Exam  Vitals signs and nursing note reviewed  Constitutional:       General: She is not in acute distress  Appearance: Normal appearance  She is well-developed  She is not diaphoretic  HENT:      Head: Normocephalic and atraumatic  Right Ear: Tympanic membrane, ear canal and external ear normal       Left Ear: Tympanic membrane, ear canal and external ear normal       Mouth/Throat:      Pharynx: No oropharyngeal exudate  Eyes:      General: No scleral icterus  Right eye: No discharge  Left eye: No discharge  Pupils: Pupils are equal, round, and reactive to light  Neck:      Musculoskeletal: Normal range of motion and neck supple  Thyroid: No thyromegaly  Cardiovascular:      Rate and Rhythm: Normal rate and regular rhythm  Heart sounds: Normal heart sounds  No murmur  No friction rub  No gallop  Pulmonary:      Effort: Pulmonary effort is normal  No respiratory distress  Breath sounds: Normal breath sounds  No wheezing or rales  Chest:      Chest wall: No tenderness  Musculoskeletal: Normal range of motion  General: No tenderness  Lymphadenopathy:      Cervical: No cervical adenopathy  Skin:     General: Skin is warm and dry  Coloration: Skin is not pale  Findings: No erythema or rash  Neurological:      Mental Status: She is alert and oriented to person, place, and time  Cranial Nerves: No cranial nerve deficit  Motor: No abnormal muscle tone  Coordination: Coordination normal    Psychiatric:         Behavior: Behavior normal          Thought Content:  Thought content normal          Judgment: Judgment normal

## 2020-09-04 ENCOUNTER — HOSPITAL ENCOUNTER (OUTPATIENT)
Dept: MRI IMAGING | Facility: HOSPITAL | Age: 30
Discharge: HOME | End: 2020-09-04
Payer: COMMERCIAL

## 2020-09-04 ENCOUNTER — TELEMEDICINE (OUTPATIENT)
Dept: BEHAVIORAL/MENTAL HEALTH CLINIC | Facility: CLINIC | Age: 30
End: 2020-09-04
Payer: COMMERCIAL

## 2020-09-04 DIAGNOSIS — F41.1 GENERALIZED ANXIETY DISORDER: Chronic | ICD-10-CM

## 2020-09-04 DIAGNOSIS — G44.59 OTHER COMPLICATED HEADACHE SYNDROME: ICD-10-CM

## 2020-09-04 DIAGNOSIS — F33.1 DEPRESSION, MAJOR, RECURRENT, MODERATE (HCC): Primary | Chronic | ICD-10-CM

## 2020-09-04 DIAGNOSIS — G43.709 CHRONIC MIGRAINE WITHOUT AURA WITHOUT STATUS MIGRAINOSUS, NOT INTRACTABLE: ICD-10-CM

## 2020-09-04 PROCEDURE — 70551 MRI BRAIN STEM W/O DYE: CPT

## 2020-09-04 PROCEDURE — 90834 PSYTX W PT 45 MINUTES: CPT | Performed by: SOCIAL WORKER

## 2020-09-04 PROCEDURE — G1004 CDSM NDSC: HCPCS
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
## 2020-09-14 DIAGNOSIS — F41.1 GENERALIZED ANXIETY DISORDER: ICD-10-CM

## 2020-09-14 DIAGNOSIS — G43.709 CHRONIC MIGRAINE WITHOUT AURA WITHOUT STATUS MIGRAINOSUS, NOT INTRACTABLE: ICD-10-CM

## 2020-09-14 RX ORDER — ESCITALOPRAM OXALATE 10 MG/1
10 TABLET ORAL DAILY
Qty: 30 TABLET | Refills: 2 | Status: SHIPPED | OUTPATIENT
Start: 2020-09-14 | End: 2021-01-08 | Stop reason: SDUPTHER

## 2020-09-14 RX ORDER — INDOMETHACIN 25 MG/1
CAPSULE ORAL
Qty: 15 CAPSULE | Refills: 0 | Status: SHIPPED | OUTPATIENT
Start: 2020-09-14 | End: 2020-10-08 | Stop reason: SDUPTHER

## 2020-09-18 ENCOUNTER — TELEMEDICINE (OUTPATIENT)
Dept: BEHAVIORAL/MENTAL HEALTH CLINIC | Facility: CLINIC | Age: 30
End: 2020-09-18
Payer: COMMERCIAL

## 2020-09-18 DIAGNOSIS — F33.1 DEPRESSION, MAJOR, RECURRENT, MODERATE (HCC): Primary | Chronic | ICD-10-CM

## 2020-09-18 DIAGNOSIS — F41.1 GENERALIZED ANXIETY DISORDER: Chronic | ICD-10-CM

## 2020-09-18 PROCEDURE — 90834 PSYTX W PT 45 MINUTES: CPT | Performed by: SOCIAL WORKER
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
## 2020-10-02 ENCOUNTER — TELEMEDICINE (OUTPATIENT)
Dept: BEHAVIORAL/MENTAL HEALTH CLINIC | Facility: CLINIC | Age: 30
End: 2020-10-02
Payer: COMMERCIAL

## 2020-10-02 DIAGNOSIS — F41.1 GENERALIZED ANXIETY DISORDER: Chronic | ICD-10-CM

## 2020-10-02 DIAGNOSIS — F33.1 DEPRESSION, MAJOR, RECURRENT, MODERATE (HCC): Primary | Chronic | ICD-10-CM

## 2020-10-02 PROCEDURE — 90834 PSYTX W PT 45 MINUTES: CPT | Performed by: SOCIAL WORKER

## 2020-10-06 ENCOUNTER — TELEPHONE (OUTPATIENT)
Dept: NEUROLOGY | Facility: CLINIC | Age: 30
End: 2020-10-06

## 2020-10-07 ENCOUNTER — TELEPHONE (OUTPATIENT)
Dept: NEUROLOGY | Facility: CLINIC | Age: 30
End: 2020-10-07

## 2020-10-07 ENCOUNTER — SOCIAL WORK (OUTPATIENT)
Dept: BEHAVIORAL/MENTAL HEALTH CLINIC | Facility: CLINIC | Age: 30
End: 2020-10-07
Payer: COMMERCIAL

## 2020-10-07 DIAGNOSIS — F33.1 DEPRESSION, MAJOR, RECURRENT, MODERATE (HCC): Primary | Chronic | ICD-10-CM

## 2020-10-07 DIAGNOSIS — F41.1 GENERALIZED ANXIETY DISORDER: Chronic | ICD-10-CM

## 2020-10-07 PROCEDURE — 90834 PSYTX W PT 45 MINUTES: CPT | Performed by: SOCIAL WORKER

## 2020-10-08 ENCOUNTER — PROCEDURE VISIT (OUTPATIENT)
Dept: NEUROLOGY | Facility: CLINIC | Age: 30
End: 2020-10-08
Payer: COMMERCIAL

## 2020-10-08 VITALS
BODY MASS INDEX: 34.84 KG/M2 | TEMPERATURE: 98.4 F | WEIGHT: 196.6 LBS | HEIGHT: 63 IN | SYSTOLIC BLOOD PRESSURE: 124 MMHG | HEART RATE: 86 BPM | DIASTOLIC BLOOD PRESSURE: 78 MMHG

## 2020-10-08 DIAGNOSIS — G43.709 CHRONIC MIGRAINE WITHOUT AURA WITHOUT STATUS MIGRAINOSUS, NOT INTRACTABLE: Primary | ICD-10-CM

## 2020-10-08 PROCEDURE — 64615 CHEMODENERV MUSC MIGRAINE: CPT | Performed by: PHYSICIAN ASSISTANT

## 2020-10-08 RX ORDER — INDOMETHACIN 25 MG/1
CAPSULE ORAL
Qty: 15 CAPSULE | Refills: 2 | Status: SHIPPED | OUTPATIENT
Start: 2020-10-08 | End: 2021-05-13 | Stop reason: SDUPTHER

## 2020-10-08 RX ORDER — KETOROLAC TROMETHAMINE 10 MG/1
10 TABLET, FILM COATED ORAL EVERY 6 HOURS PRN
Qty: 10 TABLET | Refills: 2 | Status: SHIPPED | OUTPATIENT
Start: 2020-10-08 | End: 2021-02-18 | Stop reason: SDUPTHER

## 2020-10-20 DIAGNOSIS — M79.7 FIBROMYALGIA: ICD-10-CM

## 2020-10-20 RX ORDER — PREGABALIN 25 MG/1
25 CAPSULE ORAL 2 TIMES DAILY
Qty: 60 CAPSULE | Refills: 2 | Status: SHIPPED | OUTPATIENT
Start: 2020-10-20 | End: 2021-02-03 | Stop reason: SDUPTHER

## 2020-10-21 ENCOUNTER — TELEPHONE (OUTPATIENT)
Dept: NEUROLOGY | Facility: CLINIC | Age: 30
End: 2020-10-21

## 2020-10-30 ENCOUNTER — SOCIAL WORK (OUTPATIENT)
Dept: BEHAVIORAL/MENTAL HEALTH CLINIC | Facility: CLINIC | Age: 30
End: 2020-10-30
Payer: COMMERCIAL

## 2020-10-30 DIAGNOSIS — F33.1 DEPRESSION, MAJOR, RECURRENT, MODERATE (HCC): Primary | Chronic | ICD-10-CM

## 2020-10-30 DIAGNOSIS — F41.1 GENERALIZED ANXIETY DISORDER: Chronic | ICD-10-CM

## 2020-10-30 PROCEDURE — 90834 PSYTX W PT 45 MINUTES: CPT | Performed by: SOCIAL WORKER

## 2020-11-10 ENCOUNTER — TELEPHONE (OUTPATIENT)
Dept: PAIN MEDICINE | Facility: MEDICAL CENTER | Age: 30
End: 2020-11-10

## 2020-11-11 ENCOUNTER — HOSPITAL ENCOUNTER (EMERGENCY)
Facility: HOSPITAL | Age: 30
Discharge: HOME | End: 2020-11-11
Attending: EMERGENCY MEDICINE | Admitting: EMERGENCY MEDICINE
Payer: COMMERCIAL

## 2020-11-11 VITALS
RESPIRATION RATE: 20 BRPM | WEIGHT: 200 LBS | SYSTOLIC BLOOD PRESSURE: 139 MMHG | BODY MASS INDEX: 35.44 KG/M2 | HEART RATE: 101 BPM | TEMPERATURE: 99 F | OXYGEN SATURATION: 96 % | DIASTOLIC BLOOD PRESSURE: 67 MMHG | HEIGHT: 63 IN

## 2020-11-11 DIAGNOSIS — G89.29 CHRONIC PAIN: Primary | ICD-10-CM

## 2020-11-11 PROCEDURE — 99283 EMERGENCY DEPT VISIT LOW MDM: CPT

## 2020-11-11 PROCEDURE — 96372 THER/PROPH/DIAG INJ SC/IM: CPT

## 2020-11-11 PROCEDURE — 99282 EMERGENCY DEPT VISIT SF MDM: CPT | Performed by: EMERGENCY MEDICINE

## 2020-11-11 RX ORDER — PREDNISONE 1 MG/1
TABLET ORAL DAILY
COMMUNITY
End: 2021-02-03

## 2020-11-11 RX ORDER — TRAMADOL HYDROCHLORIDE 50 MG/1
50 TABLET ORAL ONCE
Status: COMPLETED | OUTPATIENT
Start: 2020-11-11 | End: 2020-11-11

## 2020-11-11 RX ORDER — HALOPERIDOL 5 MG/ML
5 INJECTION INTRAMUSCULAR ONCE
Status: COMPLETED | OUTPATIENT
Start: 2020-11-11 | End: 2020-11-11

## 2020-11-11 RX ORDER — DIPHENHYDRAMINE HYDROCHLORIDE 50 MG/ML
50 INJECTION INTRAMUSCULAR; INTRAVENOUS ONCE
Status: COMPLETED | OUTPATIENT
Start: 2020-11-11 | End: 2020-11-11

## 2020-11-11 RX ADMIN — DIPHENHYDRAMINE HYDROCHLORIDE 50 MG: 50 INJECTION INTRAMUSCULAR; INTRAVENOUS at 13:27

## 2020-11-11 RX ADMIN — TRAMADOL HYDROCHLORIDE 50 MG: 50 TABLET, FILM COATED ORAL at 13:25

## 2020-11-11 RX ADMIN — HALOPERIDOL LACTATE 5 MG: 5 INJECTION, SOLUTION INTRAMUSCULAR at 13:27

## 2020-11-13 ENCOUNTER — DOCUMENTATION (OUTPATIENT)
Dept: NEUROLOGY | Facility: CLINIC | Age: 30
End: 2020-11-13

## 2020-11-13 ENCOUNTER — SOCIAL WORK (OUTPATIENT)
Dept: BEHAVIORAL/MENTAL HEALTH CLINIC | Facility: CLINIC | Age: 30
End: 2020-11-13
Payer: COMMERCIAL

## 2020-11-13 ENCOUNTER — IMMUNIZATIONS (OUTPATIENT)
Dept: FAMILY MEDICINE CLINIC | Facility: CLINIC | Age: 30
End: 2020-11-13
Payer: COMMERCIAL

## 2020-11-13 DIAGNOSIS — Z23 ENCOUNTER FOR IMMUNIZATION: ICD-10-CM

## 2020-11-13 DIAGNOSIS — F33.1 DEPRESSION, MAJOR, RECURRENT, MODERATE (HCC): Primary | Chronic | ICD-10-CM

## 2020-11-13 DIAGNOSIS — F41.1 GENERALIZED ANXIETY DISORDER: Chronic | ICD-10-CM

## 2020-11-13 PROCEDURE — 90686 IIV4 VACC NO PRSV 0.5 ML IM: CPT

## 2020-11-13 PROCEDURE — 90471 IMMUNIZATION ADMIN: CPT

## 2020-11-13 PROCEDURE — 90834 PSYTX W PT 45 MINUTES: CPT | Performed by: SOCIAL WORKER

## 2020-11-23 DIAGNOSIS — M79.7 FIBROMYALGIA: ICD-10-CM

## 2020-11-24 RX ORDER — ARIPIPRAZOLE 2 MG/1
2 TABLET ORAL DAILY
Qty: 30 TABLET | Refills: 2 | Status: SHIPPED | OUTPATIENT
Start: 2020-11-24 | End: 2021-02-17 | Stop reason: SDUPTHER

## 2020-12-01 ENCOUNTER — SOCIAL WORK (OUTPATIENT)
Dept: BEHAVIORAL/MENTAL HEALTH CLINIC | Facility: CLINIC | Age: 30
End: 2020-12-01
Payer: COMMERCIAL

## 2020-12-01 DIAGNOSIS — F33.1 DEPRESSION, MAJOR, RECURRENT, MODERATE (HCC): Primary | Chronic | ICD-10-CM

## 2020-12-01 DIAGNOSIS — F41.1 GENERALIZED ANXIETY DISORDER: Chronic | ICD-10-CM

## 2020-12-01 PROCEDURE — 90834 PSYTX W PT 45 MINUTES: CPT | Performed by: SOCIAL WORKER

## 2020-12-04 DIAGNOSIS — F41.1 GENERALIZED ANXIETY DISORDER: ICD-10-CM

## 2020-12-04 RX ORDER — BUPROPION HYDROCHLORIDE 150 MG/1
150 TABLET ORAL DAILY
Qty: 30 TABLET | Refills: 2 | Status: SHIPPED | OUTPATIENT
Start: 2020-12-04 | End: 2021-02-17 | Stop reason: SDUPTHER

## 2020-12-07 ENCOUNTER — TELEPHONE (OUTPATIENT)
Dept: NEUROLOGY | Facility: CLINIC | Age: 30
End: 2020-12-07

## 2020-12-09 ENCOUNTER — SOCIAL WORK (OUTPATIENT)
Dept: BEHAVIORAL/MENTAL HEALTH CLINIC | Facility: CLINIC | Age: 30
End: 2020-12-09
Payer: COMMERCIAL

## 2020-12-09 DIAGNOSIS — F41.1 GENERALIZED ANXIETY DISORDER: Chronic | ICD-10-CM

## 2020-12-09 DIAGNOSIS — F33.1 DEPRESSION, MAJOR, RECURRENT, MODERATE (HCC): Primary | Chronic | ICD-10-CM

## 2020-12-09 PROCEDURE — 90834 PSYTX W PT 45 MINUTES: CPT | Performed by: SOCIAL WORKER

## 2020-12-16 ENCOUNTER — TELEPHONE (OUTPATIENT)
Dept: FAMILY MEDICINE CLINIC | Facility: CLINIC | Age: 30
End: 2020-12-16

## 2020-12-21 ENCOUNTER — OFFICE VISIT (OUTPATIENT)
Dept: NEUROLOGY | Facility: CLINIC | Age: 30
End: 2020-12-21
Payer: COMMERCIAL

## 2020-12-21 VITALS
WEIGHT: 208 LBS | BODY MASS INDEX: 36.86 KG/M2 | HEART RATE: 97 BPM | SYSTOLIC BLOOD PRESSURE: 130 MMHG | DIASTOLIC BLOOD PRESSURE: 86 MMHG | HEIGHT: 63 IN

## 2020-12-21 DIAGNOSIS — G43.709 CHRONIC MIGRAINE WITHOUT AURA WITHOUT STATUS MIGRAINOSUS, NOT INTRACTABLE: ICD-10-CM

## 2020-12-21 DIAGNOSIS — G47.9 SLEEP DISTURBANCE: ICD-10-CM

## 2020-12-21 DIAGNOSIS — M79.7 FIBROMYALGIA SYNDROME: ICD-10-CM

## 2020-12-21 DIAGNOSIS — G93.40 ENCEPHALOPATHY: Primary | ICD-10-CM

## 2020-12-21 DIAGNOSIS — R41.89 COGNITIVE DECLINE: ICD-10-CM

## 2020-12-21 PROCEDURE — 1036F TOBACCO NON-USER: CPT | Performed by: PHYSICIAN ASSISTANT

## 2020-12-21 PROCEDURE — 99213 OFFICE O/P EST LOW 20 MIN: CPT | Performed by: PHYSICIAN ASSISTANT

## 2020-12-21 PROCEDURE — 3008F BODY MASS INDEX DOCD: CPT | Performed by: PHYSICIAN ASSISTANT

## 2020-12-21 RX ORDER — LANOLIN ALCOHOL/MO/W.PET/CERES
1000 CREAM (GRAM) TOPICAL DAILY
COMMUNITY
End: 2022-05-18

## 2021-01-05 ENCOUNTER — TELEMEDICINE (OUTPATIENT)
Dept: BEHAVIORAL/MENTAL HEALTH CLINIC | Facility: CLINIC | Age: 31
End: 2021-01-05
Payer: COMMERCIAL

## 2021-01-05 DIAGNOSIS — F41.1 GENERALIZED ANXIETY DISORDER: Chronic | ICD-10-CM

## 2021-01-05 DIAGNOSIS — F33.1 DEPRESSION, MAJOR, RECURRENT, MODERATE (HCC): Primary | Chronic | ICD-10-CM

## 2021-01-05 PROCEDURE — 90834 PSYTX W PT 45 MINUTES: CPT | Performed by: SOCIAL WORKER

## 2021-01-05 NOTE — PSYCH
Virtual Regular Visit    This note was not shared with the patient due to this is a psychotherapy note    Assessment/Plan:    Problem List Items Addressed This Visit        Other    Generalized anxiety disorder (Chronic)    Depression, major, recurrent, moderate ([facility] [address] ) - Primary (Chronic)             Reason for visit is Behavioral Health session, conducted through video, due to COVID-19 precautions  Leslee King has verbalized a preference to continue with virtual sessions at this time  Leslee King has been offered in-person sessions and has declined  Encounter provider APARNA Sands    Provider located at [address] [place] [address] [address]  [facility] [id number]      Recent Visits  No visits were found meeting these conditions  Showing recent visits within past 7 days and meeting all other requirements     Future Appointments  No visits were found meeting these conditions  Showing future appointments within next 150 days and meeting all other requirements        The patient was identified by name and date of birth  Nando Domínguez was informed that this is a telemedicine visit and that the visit is being conducted through [employer] and patient was informed that this is a secure, HIPAA-compliant platform  She agrees to proceed     My office door was closed  No one else was in the room  She acknowledged consent and understanding of privacy and security of the video platform  The patient has agreed to participate and understands they can discontinue the visit at any time  Patient is aware this is a billable service  Rashad Flaherty is a 35 y o  female  DATA: Met with Stephanie for scheduled individual session  Topics of discussion included health-related issues; celebrating the holidays; relationship with boyfriend  Mechelle Mills discussed her recent medical appointments and her management of her physical health symptoms   She discussed her relationship with her boyfriend and some issues that have arisen with communication  She states that she is setting limits with him  She continues to endorse ambivalence about her overall satisfaction with this relationship  She states that, at this point, she wants to maintain the relationship  We discussed using effective communication skills to maintain her personal limits and boundaries with him  This clinician offered to have a couple's session to discuss effective communication skills  Client shows evidence of utilizing continued mindfulness skills to manage mental health symptoms  During this session, this clinician used the following therapeutic modalities: supportive psychotherapy, client-centered therapy, mindfulness-based strategies, DBT-informed skills, Motivational Interviewing and solution-focused therapy  Clinician provided psychoeducation regarding use of increased physical activity to decrease depressive symptoms and anxiety and increase overall physical and emotional wellness  The clinician assigned the following for the client to complete prior to the next session: increase physical activity  Maintain personal limits and boundaries  ASSESSMENT: Phill Goldstein presents with a primarily euthymic mood  Her affect is normal range and intensity, appropriate  Phill Goldstein exhibits good therapeutic rapport with this clinician  Phill Goldstein continues to exhibit willingness to work on treatment goals and objectives  Phill Goldstein presents with a minimal risk of suicide, minimal risk of self-harm, and minimal risk of harm to others  PLAN: Phill Goldstein will return in two weeks for the next scheduled session  Between sessions, Phill Goldstein will increase her use of physical activity to help her manage her physical and emotional health and will report back during the next session re: successes and barriers   At the next session, this clinician will use supportive psychotherapy, client-centered therapy, mindfulness-based strategies, DBT-informed skills, Motivational Interviewing and solution-focused therapy to address her mood regulation and relationship concerns, in an effort to assist Alvin Marquis with meeting treatment goals  HPI     Past Medical History:   Diagnosis Date    Abnormal Pap smear of cervix     Anxiety     Arthritis     Depression     Fibromyalgia, primary     Migraine     Vitamin B12 deficiency        Past Surgical History:   Procedure Laterality Date    COLONOSCOPY N/A 5/8/2018    Procedure: COLONOSCOPY;  Surgeon: India Saavedra MD;  Location: [facility]; Service: Gastroenterology    TONSILLECTOMY      WISDOM TOOTH EXTRACTION         Current Outpatient Medications   Medication Sig Dispense Refill    ALPRAZolam (XANAX) 0 5 mg tablet Take 1 tablet (0 5 mg total) by mouth daily as needed for anxiety 30 tablet 0    ARIPiprazole (ABILIFY) 2 mg tablet Take 1 tablet (2 mg total) by mouth daily 30 tablet 2    B-D 3CC LUER-DEANN SYR 25GX1" 25G X 1" 3 ML MISC       buPROPion (WELLBUTRIN XL) 150 mg 24 hr tablet Take 1 tablet (150 mg total) by mouth daily 30 tablet 2    Cholecalciferol (VITAMIN D3) 74347 units CAPS Take by mouth once a week      Erenumab-aooe 140 MG/ML SOAJ Inject 140 mg under the skin every 30 (thirty) days 1 pen 11    escitalopram (LEXAPRO) 10 mg tablet Take 1 tablet (10 mg total) by mouth daily 30 tablet 2    etonogestrel-ethinyl estradiol (NuvaRing) 0 12-0 015 MG/24HR vaginal ring Insert ring for 21 days then remove for one week  3 each 3    indomethacin (INDOCIN) 25 mg capsule 1-2 tabs BID PRN migraine WITH FOOD  Hold toradol  15 capsule 2    ketorolac (TORADOL) 10 mg tablet Take 1 tablet (10 mg total) by mouth every 6 (six) hours as needed (migraine) Max 2-3 per week   10 tablet 2    ketorolac (TORADOL) 60 mg/2 mL Inject 1-2 mL (30-60 mg total) into a muscle every 6 (six) hours as needed for moderate pain (Patient not taking: Reported on 12/21/2020) 6 mL 1    melatonin 1 mg 1-2 tabs qhs prn insomnia 60 tablet 2    onabotulinumtoxin A (BOTOX) 100 units Inject as directed      predniSONE 1 mg tablet Take by mouth daily      pregabalin (LYRICA) 25 mg capsule Take 1 capsule (25 mg total) by mouth 2 (two) times a day 60 capsule 2    Syringe/Needle, Disp, (SYRINGE 3CC/23GC5-9/4") 27G X 1-1/4" 3 ML MISC Use for IM injection of ketorolac  (Patient not taking: Reported on 12/21/2020) 4 each 0    vitamin B-12 (VITAMIN B-12) 1,000 mcg tablet Take 1,000 mcg by mouth daily       Current Facility-Administered Medications   Medication Dose Route Frequency Provider Last Rate Last Admin    cyanocobalamin injection 1,000 mcg  1,000 mcg Intramuscular Q30 Days Chuy Macadamia, DO   1,000 mcg at 08/03/20 5379    cyanocobalamin injection 1,000 mcg  1,000 mcg Intramuscular Q14 Days Chuy Macadamia, DO   1,000 mcg at 05/18/20 1146    cyanocobalamin injection 1,000 mcg  1,000 mcg Intramuscular Q30 Days Chuy Macadamia, DO   1,000 mcg at 09/01/20 1129        Allergies   Allergen Reactions    Desvenlafaxine      Other reaction(s): state of confusion    Valproic Acid Other (See Comments)     Other reaction(s): dilated pupils, "schizi"       Review of Systems    Video Exam    There were no vitals filed for this visit  Physical Exam     I spent 50 minutes directly with the patient during this visit      VIRTUAL VISIT [address]o acknowledges that she has consented to an online visit or consultation  She understands that the online visit is based solely on information provided by her, and that, in the absence of a face-to-face physical evaluation by the physician, the diagnosis she receives is both limited and provisional in terms of accuracy and completeness  This is not intended to replace a full medical face-to-face evaluation by the physician  Erin Redman understands and accepts these terms

## 2021-01-08 DIAGNOSIS — F41.1 GENERALIZED ANXIETY DISORDER: ICD-10-CM

## 2021-01-08 RX ORDER — ESCITALOPRAM OXALATE 10 MG/1
10 TABLET ORAL DAILY
Qty: 30 TABLET | Refills: 2 | Status: SHIPPED | OUTPATIENT
Start: 2021-01-08 | End: 2021-05-24 | Stop reason: SDUPTHER

## 2021-01-11 ENCOUNTER — TELEPHONE (OUTPATIENT)
Dept: NEUROLOGY | Facility: CLINIC | Age: 31
End: 2021-01-11

## 2021-01-11 NOTE — TELEPHONE ENCOUNTER
Neuropsychological Evaluation  [facility]  [address]  [place] [address]  P: [phone] F: [phone]    Intake Note  Date of Referral to Neuropsychology: 12/21/2021  Referring Provider: Mariano Sofia to conduct screening prior to scheduling neuropsychological evaluation  Spoke to patient directly to answer intake questions  Explained nature of neuropsychological evaluation  Patient is agreeable to evaluation  The following questions were answered  1 ) Does the patient have the ability to do a virtual intake? Yes, familiar with teams    2 ) Does the patient have any problems that would limit her ability to participate in testing that requires interaction with another person, such as hearing or language impairments? No    3 ) Does the patient have any problems that would limit her ability to complete testing during one appointment that can last for more than 2 hours? (e g , severe fatigue, pain, or other debility) No    4 ) Does the patient have a preference between completing the evaluation in one session, or over the course of a few sessions? No    5 ) Does the patient have difficulties ambulating (e g , does she need a walker, cane, or wheelchair)? No    6 ) Would the patient be available for a last minute appointment if the opportunity arises? Yes, mostly available    Referral will be reviewed by providers and we will call to schedule as appropriate

## 2021-01-12 ENCOUNTER — TELEPHONE (OUTPATIENT)
Dept: NEUROLOGY | Facility: CLINIC | Age: 31
End: 2021-01-12

## 2021-01-12 NOTE — TELEPHONE ENCOUNTER
Registration completed 1/13/2021 8:15AM [address] at [facility]  Covid-screening questions completed  Aware of all policies - mask, visitor and no show fee

## 2021-01-13 ENCOUNTER — PROCEDURE VISIT (OUTPATIENT)
Dept: NEUROLOGY | Facility: CLINIC | Age: 31
End: 2021-01-13
Payer: COMMERCIAL

## 2021-01-13 VITALS — SYSTOLIC BLOOD PRESSURE: 134 MMHG | TEMPERATURE: 98.7 F | HEART RATE: 94 BPM | DIASTOLIC BLOOD PRESSURE: 74 MMHG

## 2021-01-13 DIAGNOSIS — G43.709 CHRONIC MIGRAINE WITHOUT AURA WITHOUT STATUS MIGRAINOSUS, NOT INTRACTABLE: Primary | ICD-10-CM

## 2021-01-13 PROCEDURE — 64615 CHEMODENERV MUSC MIGRAINE: CPT | Performed by: PHYSICIAN ASSISTANT

## 2021-01-13 NOTE — PROGRESS NOTES
Universal Protocol   Consent: Verbal consent obtained    Risks and benefits: risks, benefits and alternatives were discussed  Consent given by: patient        Chemodenervation     Date/Time 1/13/2021 8:43 AM     Performed by  Yolande Germain PA-C     Authorized by Yolande Germain PA-C        Pre-procedure details      Prepped With: Alcohol     Procedure details     Position:  Upright   Botox     Botox Type:  Type A    Brand:  Botox    mL's of Botulinum Toxin:  180    Final Concentration per CC:  100 units    Needle Gauge:  30 G 2 5 inch   Procedures     Botox Procedures: chronic headache      Indications: migraines     Injection Location      Head / Face:  L , R , L frontalis, R frontalis, R inferior cervical paraspinal, L inferior cervical paraspinal, L medial occipitalis, R medial occipitalis, L lateral occipitalis, R lateral occipitalis, procerus, L temporalis, R temporalis, R superior trapezius and L superior trapezius    L  injection amount:  5 unit(s)    R  injection amount:  5 unit(s)    L lateral frontalis:  5 unit(s)    R lateral frontalis:  5 unit(s)    L medial frontalis:  5 unit(s)    R medial frontalis:  5 unit(s)    L temporalis injection amount:  20 unit(s)    R temporalis injection amount:  20 unit(s)    Procerus injection amount:  5 unit(s)    L lateral occipitalis injection amount:  5 unit(s)    R lateral occipitalis injection amount:  5 unit(s)    L medial occipitalis injection amount:  10 unit(s)    R medial occipitalis injection amount:  10 unit(s)    L inferior cervical paraspinal injection amount:  10 unit(s)    R inferior cervical paraspinal injection amount:  10 unit(s)    L superior trapezius injection amount:  0 unit(s)    R superior trapezius injection amount:  0 unit(s)   Total Units     Total units used:  180    Total units discarded:  20   Post-procedure details      Chemodenervation:  Chronic migraine    Facial Nerve Location[de-identified]  Bilateral facial nerve    Patient tolerance of procedure: Tolerated well, no immediate complications   Comments      55 extra units in the temporoparietal regions b/l and headband region, medically necessary  Avoid traps b/l         Blood pressure 134/74, pulse 94, temperature 98 7 °F (37 1 °C), temperature source Temporal

## 2021-01-18 ENCOUNTER — TELEPHONE (OUTPATIENT)
Dept: NEUROLOGY | Facility: CLINIC | Age: 31
End: 2021-01-18

## 2021-01-18 ENCOUNTER — PATIENT MESSAGE (OUTPATIENT)
Dept: NEUROLOGY | Facility: CLINIC | Age: 31
End: 2021-01-18

## 2021-01-18 DIAGNOSIS — G43.709 CHRONIC MIGRAINE WITHOUT AURA WITHOUT STATUS MIGRAINOSUS, NOT INTRACTABLE: Primary | ICD-10-CM

## 2021-01-18 NOTE — TELEPHONE ENCOUNTER
Pt called reporting new symptoms-squeezing and sharp stabbing pain-left side of her head  This started yesterday afternoon  It comes and goes  Took indomethacin last night but not effective  States that this is not her usual migraine  No other symptoms  No new meds or med changes  No recent illness  Precipitating factors: unsure   Denies stabbing pain at this time  But when she got it yesterday, it was 8/10     Current migraine medications are confirmed as:  indomethacine 25 mg 1-2 tabs bid prn   toradol 10 mg 1 tab prn     Pls see [employer] message               [phone] ok to leave detailed message

## 2021-01-19 RX ORDER — TIZANIDINE HYDROCHLORIDE 2 MG/1
CAPSULE, GELATIN COATED ORAL
Qty: 30 CAPSULE | Refills: 0 | Status: SHIPPED | OUTPATIENT
Start: 2021-01-19 | End: 2021-02-03

## 2021-01-19 NOTE — TELEPHONE ENCOUNTER
Patient calling in  She is reporting that she is having the Squeezing and stabbing pain on L side of the head on frontal head  Pain makes her flinch when she gets the stabbing sensation  Happens every 10 mins  She had increased the Indocin to 50mg and the pain recently came back this afternoon  Any recommendations?

## 2021-01-19 NOTE — TELEPHONE ENCOUNTER
I sent tizanidine qhs  I wanted her to try decadron but it appears she is taking prednisone? ? Is this still on her regimen? Also cannot take depakote which is on her allergy list   If tizanidine does not help, can try inodicn TID with food until cycle breaks

## 2021-01-19 NOTE — TELEPHONE ENCOUNTER
Called patient back  She states that she is not on the prednisone and stopped this some time ago  She will try the tizanidine and let us know if this helps  Also made aware that indocin is an option

## 2021-01-20 ENCOUNTER — TELEMEDICINE (OUTPATIENT)
Dept: BEHAVIORAL/MENTAL HEALTH CLINIC | Facility: CLINIC | Age: 31
End: 2021-01-20
Payer: COMMERCIAL

## 2021-01-20 DIAGNOSIS — F41.1 GENERALIZED ANXIETY DISORDER: Chronic | ICD-10-CM

## 2021-01-20 DIAGNOSIS — F33.1 DEPRESSION, MAJOR, RECURRENT, MODERATE (HCC): Primary | Chronic | ICD-10-CM

## 2021-01-20 DIAGNOSIS — G43.709 CHRONIC MIGRAINE WITHOUT AURA WITHOUT STATUS MIGRAINOSUS, NOT INTRACTABLE: Primary | ICD-10-CM

## 2021-01-20 PROCEDURE — 90834 PSYTX W PT 45 MINUTES: CPT | Performed by: SOCIAL WORKER

## 2021-01-20 NOTE — BH TREATMENT PLAN
Valentin Meg  1990         Date of Initial Treatment Plan: June 26, 2019   Date of Current Treatment Plan: January 20, 2021     Treatment Plan Number 5      Strengths/Personal Resources for Self Care: supportive family members (mother and grandmother); knowledge of (and past use of) mindfulness-based skills to manage her anxiety/depression; positive hobbies/activities     Diagnosis:   1  Depression, major, recurrent, moderate ([facility] [address] )      2  Generalized anxiety disorder            Area of Needs: Anxiety and depression; physical health issues        Long Term Goal 1: "I want to learn to handle my depression and anxiety better "     Target Date:         May 20, 2021                                         Treatment Plan Expiration Date:         July 20, 2021      Completion Date: To be determined         Short Term Objectives for Goal 1:             6  Cruz will identify triggers and prompting events that increase symptoms of anxiety and depression   Update January 20, 2021: Miguel Story exhibits the ability to identify some of the triggers and prompting events that impact her ability to manage her moods  During this update, Miguel Story attributed her mood dysregulation to the winter weather  We will continue to explore triggers and prompting events, as we move forward, so we can continue to develop strategies for mood management             2  Cruz will learn and exhibit understanding of a minimum of three distress tolerance skills and emotion regulation skills to assist with symptom reduction   Update January 20, 2021: Miguelgage Story identifies that she does have some knowledge of distress tolerance skills; however, she identifies that she is struggling to put her skills into action  We will continue to address the skills that she has regarding emotion regulation and build upon those that can help her manage her depressive cycles                3  Yulia Gallardo will increase her daily physical activity to a duration of 15-30 minutes (including walking, swimming, stretching, etc )   Update January 20, 2021: Phill Goldstein acknowledges that the current covid-19 crisis has impacted her motivation to increase her exercise  She states that she is currently feeling depressed; and, due to the winter weather, she lacks motivation to go outside  She states that she is willing to try to incorporate some gentle yoga into her routine               2  Efraín Strong will increase her social interactions (outside of her home) to a minimum of 3x per week    Update 1/20/2021: Phill Goldstein continues to have regular interaction with her family members and her boyfriend; however, she notes that she has a decrease in motivation to go out of her home  Phill Goldstein has limitations with in-person social interactions, due to covid precautions  She reports she has been engaging in social media connections               5  Efraín Strong will learn and utilize formal mindfulness-based strategies a minimum of 10 minutes every day    Update 1/20/2021: Phill Goldstein states that she has not been practicing mindfulness-based skills due to her level of depression  We will address barriers to practicing mindfulness-based strategies on a daily basis-- both formally and informally               6  Cruz will maintain a level of anxiety that does not surpass a 4/10 on most days  Incidents that surpass this limit will be process in therapy sessions  Update 1/20/2021: Stephanie rates her current level of depression as a 6/10  In the past two weeks, she reports her highest level of depression was approximately an 8/10  She attributes this increase in depression to her over-spending and the health issues of her mother and grandmother  She rates her current level of anxiety as a 7/10  She estimates that her highest level of anxiety in the past two weeks was approximately an 8/10  She states that she is feeling a lack of motivation to engage in social activities                5  Meng Nielsen will maintain adherence with medication management sessions and her medication regimen  Update 1/20/2021: Meng Nielsen states that she believes she has not missed any doses of medications within the past two weeks  She states that she is using MJ when she has "a really bad flare " She states that she has used it two times within the past two weeks  She continues to see Dr Daniel Grayson for medication management  She feels that her lexapro and her wellbutrin are both helpful in managing her moods  "I feel like it helps to boost me "     8  Meng Nielsen will follow through with the completion of neuropsych testing (if/when approved by her insurance carrier)         GOAL 1: Modality: Individual 1-2x per month   Completion Date to be determined, Medication Management and The person(s) responsible for carrying out the plan is  Stephanie (client); Preet Mina (clinician); Dr Atif Alvarez (psychiatrist)     Clinician will use client-centered therapy, mindfulness-based strategies, DBT-informed skills and solution-focused therapy to address Cruz's symptoms of anxiety  Cruz will practice skills between sessions and will report back, during subsequent sessions regarding successes and barriers          [address] [place] [address] Luke: Diagnosis and Treatment Plan explained to Cruz Cruz relates understanding diagnosis and is agreeable to Treatment Plan          Client Comments : Please share your thoughts, feelings, need and/or experiences regarding your treatment plan: Not at this time       This treatment plan was created between this clinician and this client on 1/20/2021 @ 8:58am  Due to the current COVID-19 precautions, this treatment plan was created during a Virtual Visit (through the use of [employer] Teams platform)  The client provided verbal consent at the time of the actual session  The treatment plan was transcribed into the Electronic Health Record at a later date

## 2021-01-20 NOTE — TELEPHONE ENCOUNTER
Pharmacy calling to say that patient's insurance does not cover tizanidine capsules, but will accept tablets  Loaded new script up  Please sign if agreeable

## 2021-01-21 RX ORDER — TIZANIDINE 2 MG/1
TABLET ORAL
Qty: 30 TABLET | Refills: 0 | OUTPATIENT
Start: 2021-01-21

## 2021-01-23 NOTE — PSYCH
Virtual Regular Visit    This note was not shared with the patient due to this is a psychotherapy note        Assessment/Plan:    Problem List Items Addressed This Visit        Other    Generalized anxiety disorder (Chronic)    Depression, major, recurrent, moderate ([address] ) - Primary (Chronic)               Reason for visit is Behavioral Health session, conducted through video, due to COVID-19 precautions  Nokomis Kane has verbalized a preference to continue with virtual sessions at this time  Prashant Middleton has been offered in-person sessions and has declined  Encounter provider AAPRNA Casey    Provider located at [address] [facility]  [address] [facility] [id number]      Recent Visits  Date Type Provider Dept   01/20/21 [address] [facility]  [facility] Therap[facility]   Showing recent visits within past 7 days and meeting all other requirements     Future Appointments  No visits were found meeting these conditions  Showing future appointments within next 150 days and meeting all other requirements        The patient was identified by name and date of birth  Santana Arrieta was informed that this is a telemedicine visit and that the visit is being conducted through [employer] and patient was informed that this is a secure, HIPAA-compliant platform  She agrees to proceed     My office door was closed  No one else was in the room  She acknowledged consent and understanding of privacy and security of the video platform  The patient has agreed to participate and understands they can discontinue the visit at any time  Patient is aware this is a billable service  Malena Conway is a 35 y o  female  DATA: Met with Stephanie for scheduled individual session  Topics of discussion included physical health issues, increase in depression, relationship with her boyfriend, lack of motivation for social interaction   Marisa Sierra states that she has been experiencing a significant increase in symptoms of her rheumatological disorders  She expresses frustration with her lack of energy and her increase in depressive symptoms  She states that she would like for her partner to have more understanding of her chronic health issues  Client shows evidence of understanding how to use mindfulness-based skills and physical movement to manage mental health symptoms; however, she verbalizes that she has a lack of motivation regarding participating in social interactions  This clinician asked her if she needs to push herself a bit more toward social interactions (e g , attending group)  She agreed to attend tomorrow's group session, as she states that she does need a push  During this session, this clinician used the following therapeutic modalities: supportive psychotherapy, client-centered therapy, mindfulness-based strategies, DBT-informed skills, Motivational Interviewing and solution-focused therapy  Clinician provided psychoeducation regarding use of "opposite action to the emotion" to help her move through her depressive symptoms  The clinician assigned the following for the client to complete prior to the next session: attend group  At the end of this session, Ankit Sesay (student intern) entered the session for a brief introduction  Stephanie provided consent for his participation in this and future sessions  She acknowledges understanding that she can ask him to leave at any point in the session  ASSESSMENT: Akil Victor presents with a depressed mood  Her affect is normal range and intensity, appropriate  Akil Victor exhibits good therapeutic rapport with this clinician  Akil Victor continues to exhibit willingness to work on treatment goals and objectives  Akil Victor presents with a minimal risk of suicide, minimal risk of self-harm, and minimal risk of harm to others  PLAN: Akil Victor will return in two weeks for the next scheduled session   Between sessions, Bette Gregory will attend group and increase her physical and social activity  She will report back during the next session re: successes and barriers  At the next session, this clinician will use supportive psychotherapy, client-centered therapy, mindfulness-based strategies, DBT-informed skills, Motivational Interviewing and solution-focused therapy to address her anxiety and depression, in an effort to assist Bette Gregory with meeting treatment goals  HPI     Past Medical History:   Diagnosis Date    Abnormal Pap smear of cervix     Anxiety     Arthritis     Depression     Fibromyalgia, primary     Migraine     Vitamin B12 deficiency        Past Surgical History:   Procedure Laterality Date    COLONOSCOPY N/A 5/8/2018    Procedure: COLONOSCOPY;  Surgeon: Tracie Tejada MD;  Location: [facility] GI [facility]; Service: Gastroenterology    TONSILLECTOMY      WISDOM TOOTH EXTRACTION         Current Outpatient Medications   Medication Sig Dispense Refill    ALPRAZolam (XANAX) 0 5 mg tablet Take 1 tablet (0 5 mg total) by mouth daily as needed for anxiety 30 tablet 0    ARIPiprazole (ABILIFY) 2 mg tablet Take 1 tablet (2 mg total) by mouth daily 30 tablet 2    B-D 3CC LUER-DEANN SYR 25GX1" 25G X 1" 3 ML MISC       buPROPion (WELLBUTRIN XL) 150 mg 24 hr tablet Take 1 tablet (150 mg total) by mouth daily 30 tablet 2    Cholecalciferol (VITAMIN D3) 18609 units CAPS Take by mouth once a week      dexamethasone (DECADRON) 2 mg tablet 1 tab qam with food prn migraine  5 tablet 0    Erenumab-aooe 140 MG/ML SOAJ Inject 140 mg under the skin every 30 (thirty) days 1 pen 11    escitalopram (LEXAPRO) 10 mg tablet Take 1 tablet (10 mg total) by mouth daily 30 tablet 2    etonogestrel-ethinyl estradiol (NuvaRing) 0 12-0 015 MG/24HR vaginal ring Insert ring for 21 days then remove for one week  3 each 3    indomethacin (INDOCIN) 25 mg capsule 1-2 tabs BID PRN migraine WITH FOOD  Hold toradol   15 capsule 2    ketorolac (TORADOL) 10 mg tablet Take 1 tablet (10 mg total) by mouth every 6 (six) hours as needed (migraine) Max 2-3 per week  10 tablet 2    ketorolac (TORADOL) 60 mg/2 mL Inject 1-2 mL (30-60 mg total) into a muscle every 6 (six) hours as needed for moderate pain (Patient not taking: Reported on 12/21/2020) 6 mL 1    melatonin 1 mg 1-2 tabs qhs prn insomnia 60 tablet 2    onabotulinumtoxin A (BOTOX) 100 units Inject as directed      predniSONE 1 mg tablet Take by mouth daily      pregabalin (LYRICA) 25 mg capsule Take 1 capsule (25 mg total) by mouth 2 (two) times a day 60 capsule 2    Syringe/Needle, Disp, (SYRINGE 3CC/34UT7-9/4") 27G X 1-1/4" 3 ML MISC Use for IM injection of ketorolac  (Patient not taking: Reported on 12/21/2020) 4 each 0    TiZANidine (ZANAFLEX) 2 MG capsule 1 tab qhs prn migraine headache  30 capsule 0    vitamin B-12 (VITAMIN B-12) 1,000 mcg tablet Take 1,000 mcg by mouth daily       Current Facility-Administered Medications   Medication Dose Route Frequency Provider Last Rate Last Admin    cyanocobalamin injection 1,000 mcg  1,000 mcg Intramuscular Q30 Days Teresa Kirby, DO   1,000 mcg at 08/03/20 9398    cyanocobalamin injection 1,000 mcg  1,000 mcg Intramuscular Q14 Days Teresa Kirby, DO   1,000 mcg at 05/18/20 1146    cyanocobalamin injection 1,000 mcg  1,000 mcg Intramuscular Q30 Days Teresa Kirby, DO   1,000 mcg at 09/01/20 1129        Allergies   Allergen Reactions    Desvenlafaxine      Other reaction(s): state of confusion    Valproic Acid Other (See Comments)     Other reaction(s): dilated pupils, "schizi"    Tizanidine Anxiety       Review of Systems    Video Exam    There were no vitals filed for this visit  Physical Exam     I spent 50 minutes directly with the patient during this visit      VIRTUAL VISIT [id number] acknowledges that she has consented to an online visit or consultation   She understands that the online visit is based solely on information provided by her, and that, in the absence of a face-to-face physical evaluation by the physician, the diagnosis she receives is both limited and provisional in terms of accuracy and completeness  This is not intended to replace a full medical face-to-face evaluation by the physician  Chauncey Angel understands and accepts these terms

## 2021-02-03 ENCOUNTER — OFFICE VISIT (OUTPATIENT)
Dept: FAMILY MEDICINE CLINIC | Facility: CLINIC | Age: 31
End: 2021-02-03
Payer: COMMERCIAL

## 2021-02-03 VITALS
SYSTOLIC BLOOD PRESSURE: 138 MMHG | HEIGHT: 63 IN | WEIGHT: 217 LBS | DIASTOLIC BLOOD PRESSURE: 76 MMHG | BODY MASS INDEX: 38.45 KG/M2 | TEMPERATURE: 98.7 F

## 2021-02-03 DIAGNOSIS — L72.11 PILAR CYSTS: Primary | ICD-10-CM

## 2021-02-03 DIAGNOSIS — M79.7 FIBROMYALGIA: ICD-10-CM

## 2021-02-03 DIAGNOSIS — D23.9 DYSPLASTIC NEVI: ICD-10-CM

## 2021-02-03 PROCEDURE — 99213 OFFICE O/P EST LOW 20 MIN: CPT | Performed by: FAMILY MEDICINE

## 2021-02-03 NOTE — PROGRESS NOTES
Assessment/Plan: guidance given overall  Patient will see Dr Eliot Kan see plastic surgeon for removal of lesions  Diagnoses and all orders for this visit:    Pilar cysts  -     Ambulatory referral to Plastic Surgery; Future    Dysplastic nevi  -     Ambulatory referral to Plastic Surgery; Future    Other orders  -     Adalimumab 40 MG/0 4ML PNKT; Inject 40 mg under the skin every 14 (fourteen) days            Subjective:        Patient ID: Raman Pickard is a 35 y o  female  Patient is here with nevus on right thigh with some inflammation over the past 2 days  Patient has had nevus for multiple years  Patient also with cyst on scalp over the past few years  Patient wishes removal         The following portions of the patient's history were reviewed and updated as appropriate: allergies, current medications, past family history, past medical history, past social history, past surgical history and problem list       Review of Systems   Constitutional: Negative  HENT: Negative  Eyes: Negative  Respiratory: Negative  Cardiovascular: Negative  Gastrointestinal: Negative  Endocrine: Negative  Genitourinary: Negative  Musculoskeletal: Negative  Skin: Positive for color change  Allergic/Immunologic: Negative  Neurological: Negative  Hematological: Negative  Psychiatric/Behavioral: Negative  Objective:      BMI Counseling: Body mass index is 38 44 kg/m²  The BMI is above normal  Nutrition recommendations include decreasing portion sizes  Exercise recommendations include moderate physical activity 150 minutes/week  /76 (BP Location: Right arm, Patient Position: Sitting, Cuff Size: Large)   Temp 98 7 °F (37 1 °C) (Tympanic)   Ht 5' 3" (1 6 m)   Wt 98 4 kg (217 lb)   BMI 38 44 kg/m²          Physical Exam  Vitals signs and nursing note reviewed  Exam conducted with a chaperone present     Constitutional:       General: She is not in acute distress  Appearance: Normal appearance  She is not ill-appearing, toxic-appearing or diaphoretic  Skin:     Capillary Refill: Capillary refill takes less than 2 seconds  Comments: Fleshy color nevi right inner thigh proximally  To Pilar cysts on scalp 1 on left 1 on the right  Neurological:      Mental Status: She is alert  Psychiatric:         Mood and Affect: Mood normal          Behavior: Behavior normal          Thought Content:  Thought content normal

## 2021-02-04 ENCOUNTER — SOCIAL WORK (OUTPATIENT)
Dept: BEHAVIORAL/MENTAL HEALTH CLINIC | Facility: CLINIC | Age: 31
End: 2021-02-04
Payer: COMMERCIAL

## 2021-02-04 DIAGNOSIS — F41.1 GENERALIZED ANXIETY DISORDER: Chronic | ICD-10-CM

## 2021-02-04 DIAGNOSIS — F33.1 DEPRESSION, MAJOR, RECURRENT, MODERATE (HCC): Primary | Chronic | ICD-10-CM

## 2021-02-04 PROCEDURE — 90834 PSYTX W PT 45 MINUTES: CPT | Performed by: SOCIAL WORKER

## 2021-02-04 RX ORDER — PREGABALIN 25 MG/1
25 CAPSULE ORAL 2 TIMES DAILY
Qty: 60 CAPSULE | Refills: 3 | Status: SHIPPED | OUTPATIENT
Start: 2021-02-04 | End: 2021-09-17

## 2021-02-04 NOTE — PSYCH
This note was not shared with the patient due to this is a psychotherapy note    Psychotherapy Provided: Individual Psychotherapy 50 minutes     Length of time in session: 50 minutes, follow up in 2 weeks    Encounter Diagnosis     ICD-10-CM    1  Depression, major, recurrent, moderate ([facility] [address] )  F33 1    2  Generalized anxiety disorder  F41 1          Goals addressed in session: Goal 1     Pain:      Mild physical pain, secondary to rheumatological conditions  Current suicide risk : Low     DATA: Met with Stephanie for scheduled individual session  Topics of discussion included relationship issues; financial stressors; mood regulation; weight gain and body image  Bartolo Bishop states that her mood is depressed  She states that she feels that her mood is significantly impacted by her physical health issues  She has started her Humira  At this point, she notes mild side effects  She is hoping that, after the medication reaches a therapeutic level in her body that she will have significant symptom reduction  She discussed her history of employment the potential things that she might want to do in the future  She states that, currently, she and her boyfriend are getting along well  She states that she is working on setting and maintaining her personal limits with her boyfriend  We spent some time talking about her need to get active and increase her social supports  she agreed to attend group later today  Client shows evidence of utilizing some basic mindfulness-based skills to manage mental health symptoms  During this session, this clinician used the following therapeutic modalities: supportive psychotherapy, client-centered therapy, mindfulness-based strategies, DBT-informed skills, Motivational Interviewing and solution-focused therapy  Clinician provided psychoeducation regarding use of physical activity to improve mood  The clinician assigned the following for the client to complete prior to the next session: attend group  ASSESSMENT: Meng Nielsen presents with a somewhat dysthymic mood  Her affect is normal range and intensity, appropriate  Meng Nielsen exhibits good therapeutic rapport with this clinician  Meng Nielsen continues to exhibit willingness to work on treatment goals and objectives  Meng Nielsen presents with a minimal risk of suicide, minimal risk of self-harm, and minimal risk of harm to others  PLAN: Meng Nilesen will return in two weeks for the next scheduled session  Between sessions, Meng Nielsen will attend group and increase her physical and social activity and will report back during the next session re: successes and barriers  At the next session, this clinician will use supportive psychotherapy, client-centered therapy, mindfulness-based strategies, DBT-informed skills, Motivational Interviewing and solution-focused therapy to address her mood regulation and relationship issues, in an effort to assist Meng Nielsen with meeting treatment goals  Behavioral Health Treatment Plan [facility]: Diagnosis and Treatment Plan explained to Rosaura Rodriguez relates understanding diagnosis and is agreeable to Treatment Plan   Yes

## 2021-02-11 ENCOUNTER — TELEMEDICINE (OUTPATIENT)
Dept: FAMILY MEDICINE CLINIC | Facility: CLINIC | Age: 31
End: 2021-02-11
Payer: COMMERCIAL

## 2021-02-11 DIAGNOSIS — B34.9 VIRAL INFECTION, UNSPECIFIED: Primary | ICD-10-CM

## 2021-02-11 DIAGNOSIS — B34.9 VIRAL INFECTION, UNSPECIFIED: ICD-10-CM

## 2021-02-11 LAB — SARS-COV-2 RNA RESP QL NAA+PROBE: NEGATIVE

## 2021-02-11 PROCEDURE — 99214 OFFICE O/P EST MOD 30 MIN: CPT | Performed by: FAMILY MEDICINE

## 2021-02-11 PROCEDURE — U0003 INFECTIOUS AGENT DETECTION BY NUCLEIC ACID (DNA OR RNA); SEVERE ACUTE RESPIRATORY SYNDROME CORONAVIRUS 2 (SARS-COV-2) (CORONAVIRUS DISEASE [COVID-19]), AMPLIFIED PROBE TECHNIQUE, MAKING USE OF HIGH THROUGHPUT TECHNOLOGIES AS DESCRIBED BY CMS-2020-01-R: HCPCS | Performed by: FAMILY MEDICINE

## 2021-02-11 PROCEDURE — U0005 INFEC AGEN DETEC AMPLI PROBE: HCPCS | Performed by: FAMILY MEDICINE

## 2021-02-11 RX ORDER — AZITHROMYCIN 250 MG/1
TABLET, FILM COATED ORAL
Qty: 6 TABLET | Refills: 0 | Status: SHIPPED | OUTPATIENT
Start: 2021-02-11 | End: 2021-02-15

## 2021-02-11 NOTE — PROGRESS NOTES
COVID-19 Virtual Visit     Assessment/Plan:    Problem List Items Addressed This Visit     None      Visit Diagnoses     Viral infection, unspecified    -  Primary    Relevant Medications    azithromycin (ZITHROMAX) 250 mg tablet    Other Relevant Orders    Novel Coronavirus (Covid-19),PCR [facility] - Collected at [facility] or [facility]         Disposition:     I recommended self-quarantine for 10 days and to watch for symptoms until 14 days after exposure  If patient were to develop symptoms, they should self isolate and call our office for further guidance  I referred patient to one of our centralized sites for a COVID-19 swab  I have spent 21 minutes directly with the patient  Greater than 50% of this time was spent in counseling/coordination of care regarding: instructions for management and patient and family education  Encounter provider Todd Prater DO    Provider located at [address] [place] [id number]    Recent Visits  No visits were found meeting these conditions  Showing recent visits within past 7 days and meeting all other requirements     Today's Visits  Date Type Provider Dept   02/11/21 Telemedicine Steffi Ayoub DO [facility] [address] [facility] today's visits and meeting all other requirements     Future Appointments  No visits were found meeting these conditions  Showing future appointments within next 150 days and meeting all other requirements      This virtual check-in was done via [employer] and patient was informed that this is a secure, HIPAA-compliant platform  She agrees to proceed  Patient agrees to participate in a virtual check in via telephone or video visit instead of presenting to the office to address urgent/immediate medical needs  Patient is aware this is a billable service  After connecting through [facility], the patient was identified by name and date of birth   Bertha banks informed that this was a telemedicine visit and that the exam was being conducted confidentially over secure lines  My office door was closed  No one else was in the room  Timmy Almonte acknowledged consent and understanding of privacy and security of the telemedicine visit  I informed the patient that I have reviewed her record in Epic and presented the opportunity for her to ask any questions regarding the visit today  The patient agreed to participate  Subjective:   Timmy Almonte is a 35 y o  female who is concerned about COVID-19  Patient is currently asymptomatic  Patient's symptoms include nasal congestion, rhinorrhea, sore throat and cough  Patient denies fever, chills, fatigue, malaise, anosmia, loss of taste, shortness of breath, chest tightness, abdominal pain, nausea, vomiting, diarrhea, myalgias and headaches       Date of symptom onset: 2/10/2021    Exposure:   Contact with a person who is under investigation (PUI) for or who is positive for COVID-19 within the last 14 days?: No    Hospitalized recently for fever and/or lower respiratory symptoms?: No      Currently a healthcare worker that is involved in direct patient care?: No      Works in a special setting where the risk of COVID-19 transmission may be high? (this may include long-term care, correctional and FPC facilities; homeless shelters; assisted-living facilities and group homes ): No      Resident in a special setting where the risk of COVID-19 transmission may be high? (this may include long-term care, correctional and FPC facilities; homeless shelters; assisted-living facilities and group homes ): No      No results found for: Shaye Parker, [address] [facility], [address] [place] [address],[address] [address]  Past Medical History:   Diagnosis Date    Abnormal Pap smear of cervix     Anxiety     Arthritis     Depression     Fibromyalgia, primary     Migraine     Vitamin B12 deficiency      Past Surgical History:   Procedure Laterality Date    COLONOSCOPY N/A 5/8/2018    Procedure: COLONOSCOPY;  Surgeon: Keysha Olguin MD;  Location: [facility] GI LAB; Service: Gastroenterology    TONSILLECTOMY      WISDOM TOOTH EXTRACTION       Current Outpatient Medications   Medication Sig Dispense Refill    Adalimumab 40 MG/0 4ML PNKT Inject 40 mg under the skin every 14 (fourteen) days      ALPRAZolam (XANAX) 0 5 mg tablet Take 1 tablet (0 5 mg total) by mouth daily as needed for anxiety 30 tablet 0    ARIPiprazole (ABILIFY) 2 mg tablet Take 1 tablet (2 mg total) by mouth daily 30 tablet 2    azithromycin (ZITHROMAX) 250 mg tablet Take 2 tablets today then 1 tablet daily x 4 days 6 tablet 0    B-D 3CC LUER-DEANN SYR 25GX1" 25G X 1" 3 ML MISC       buPROPion (WELLBUTRIN XL) 150 mg 24 hr tablet Take 1 tablet (150 mg total) by mouth daily 30 tablet 2    Cholecalciferol (VITAMIN D3) 34790 units CAPS Take by mouth once a week      dexamethasone (DECADRON) 2 mg tablet 1 tab qam with food prn migraine  5 tablet 0    Erenumab-aooe 140 MG/ML SOAJ Inject 140 mg under the skin every 30 (thirty) days 1 pen 11    escitalopram (LEXAPRO) 10 mg tablet Take 1 tablet (10 mg total) by mouth daily 30 tablet 2    etonogestrel-ethinyl estradiol (NuvaRing) 0 12-0 015 MG/24HR vaginal ring Insert ring for 21 days then remove for one week  3 each 3    indomethacin (INDOCIN) 25 mg capsule 1-2 tabs BID PRN migraine WITH FOOD  Hold toradol  15 capsule 2    ketorolac (TORADOL) 10 mg tablet Take 1 tablet (10 mg total) by mouth every 6 (six) hours as needed (migraine) Max 2-3 per week  10 tablet 2    melatonin 1 mg 1-2 tabs qhs prn insomnia 60 tablet 2    onabotulinumtoxin A (BOTOX) 100 units Inject as directed      pregabalin (LYRICA) 25 mg capsule Take 1 capsule (25 mg total) by mouth 2 (two) times a day 60 capsule 3    Syringe/Needle, Disp, (SYRINGE 3CC/66OK1-5/4") 27G X 1-1/4" 3 ML MISC Use for IM injection of ketorolac   (Patient not taking: Reported on 12/21/2020) 4 each 0    vitamin B-12 (VITAMIN B-12) 1,000 mcg tablet Take 1,000 mcg by mouth daily       Current Facility-Administered Medications   Medication Dose Route Frequency Provider Last Rate Last Admin    cyanocobalamin injection 1,000 mcg  1,000 mcg Intramuscular Q30 Days Becky Lorena, DO   1,000 mcg at 08/03/20 3858    cyanocobalamin injection 1,000 mcg  1,000 mcg Intramuscular Q14 Days Becky Lorena, DO   1,000 mcg at 05/18/20 1146    cyanocobalamin injection 1,000 mcg  1,000 mcg Intramuscular Q30 Days Becky Lorena, DO   1,000 mcg at 09/01/20 1129     Allergies   Allergen Reactions    Desvenlafaxine      Other reaction(s): state of confusion    Valproic Acid Other (See Comments)     Other reaction(s): dilated pupils, "schizi"    Tizanidine Anxiety       Review of Systems   Constitutional: Negative  Negative for chills, fatigue and fever  HENT: Positive for congestion, rhinorrhea and sore throat  Eyes: Negative  Respiratory: Positive for cough  Negative for chest tightness and shortness of breath  Cardiovascular: Negative  Gastrointestinal: Negative  Negative for abdominal pain, diarrhea, nausea and vomiting  Endocrine: Negative  Genitourinary: Negative  Musculoskeletal: Negative  Negative for myalgias  Skin: Negative  Allergic/Immunologic: Negative  Neurological: Negative  Negative for headaches  Hematological: Negative  Psychiatric/Behavioral: Negative  Objective: There were no vitals filed for this visit  Physical Exam  Vitals signs and nursing note reviewed  Constitutional:       General: She is not in acute distress  Appearance: She is well-developed  She is not diaphoretic  HENT:      Head: Normocephalic and atraumatic  Right Ear: External ear normal       Left Ear: External ear normal       Nose: Nose normal       Mouth/Throat:      Pharynx: No oropharyngeal exudate  Eyes:      Extraocular Movements: Extraocular movements intact        Pupils: Pupils are equal, round, and reactive to light  Neck:      Musculoskeletal: Normal range of motion and neck supple  Thyroid: No thyromegaly  Vascular: No JVD  Trachea: No tracheal deviation  Cardiovascular:      Rate and Rhythm: Normal rate  Pulmonary:      Effort: Pulmonary effort is normal    Musculoskeletal: Normal range of motion  Skin:     General: Skin is warm and dry  Capillary Refill: Capillary refill takes less than 2 seconds  Neurological:      Mental Status: She is alert  Mental status is at baseline  Motor: No abnormal muscle tone  Psychiatric:         Behavior: Behavior normal          Thought Content: Thought content normal          Judgment: Judgment normal        VIRTUAL VISIT DISCLAIMER    Cruz Rojas acknowledges that she has consented to an online visit or consultation  She understands that the online visit is based solely on information provided by her, and that, in the absence of a face-to-face physical evaluation by the physician, the diagnosis she receives is both limited and provisional in terms of accuracy and completeness  This is not intended to replace a full medical face-to-face evaluation by the physician  Judi Pérez understands and accepts these terms

## 2021-02-17 ENCOUNTER — TELEMEDICINE (OUTPATIENT)
Dept: PSYCHIATRY | Facility: CLINIC | Age: 31
End: 2021-02-17
Payer: COMMERCIAL

## 2021-02-17 DIAGNOSIS — F41.1 GENERALIZED ANXIETY DISORDER: ICD-10-CM

## 2021-02-17 DIAGNOSIS — M79.7 FIBROMYALGIA: ICD-10-CM

## 2021-02-17 DIAGNOSIS — F33.1 DEPRESSION, MAJOR, RECURRENT, MODERATE (HCC): Primary | Chronic | ICD-10-CM

## 2021-02-17 PROBLEM — M54.50 CHRONIC LOW BACK PAIN: Status: ACTIVE | Noted: 2020-11-02

## 2021-02-17 PROBLEM — R79.82 ELEVATED C-REACTIVE PROTEIN: Status: ACTIVE | Noted: 2020-11-02

## 2021-02-17 PROBLEM — M25.569 KNEE PAIN: Status: ACTIVE | Noted: 2020-11-02

## 2021-02-17 PROBLEM — G89.29 CHRONIC LOW BACK PAIN: Status: ACTIVE | Noted: 2020-11-02

## 2021-02-17 PROBLEM — M25.50 ARTHRALGIA OF MULTIPLE SITES: Status: ACTIVE | Noted: 2020-11-02

## 2021-02-17 PROBLEM — M46.90 INFLAMMATORY SPONDYLOPATHY (HCC): Status: ACTIVE | Noted: 2020-11-02

## 2021-02-17 PROCEDURE — 99213 OFFICE O/P EST LOW 20 MIN: CPT | Performed by: PSYCHIATRY & NEUROLOGY

## 2021-02-17 RX ORDER — ARIPIPRAZOLE 2 MG/1
2 TABLET ORAL DAILY
Qty: 30 TABLET | Refills: 2 | Status: SHIPPED | OUTPATIENT
Start: 2021-02-17 | End: 2021-04-05 | Stop reason: SDUPTHER

## 2021-02-17 RX ORDER — BUPROPION HYDROCHLORIDE 150 MG/1
150 TABLET ORAL DAILY
Qty: 30 TABLET | Refills: 2 | Status: SHIPPED | OUTPATIENT
Start: 2021-02-17 | End: 2021-04-13 | Stop reason: SDUPTHER

## 2021-02-17 NOTE — PSYCH
Virtual Regular Visit      Assessment/Plan:    Problem List Items Addressed This Visit        Other    Depression, major, recurrent, moderate (HCC) - Primary (Chronic)    Relevant Medications    ARIPiprazole (ABILIFY) 2 mg tablet    buPROPion (WELLBUTRIN XL) 150 mg 24 hr tablet    Generalized anxiety disorder (Chronic)    Relevant Medications    ARIPiprazole (ABILIFY) 2 mg tablet    buPROPion (WELLBUTRIN XL) 150 mg 24 hr tablet      Other Visit Diagnoses     Fibromyalgia        Relevant Medications    ARIPiprazole (ABILIFY) 2 mg tablet               Reason for visit is   Chief Complaint   Patient presents with    Virtual Regular Visit    No Show        Encounter provider Christin Byrd MD    Provider located at [address] [place] [id number] [phone]      Recent Visits  Date Type Provider Dept   02/11/21 8001 Alie Low DO [address] [facility] recent visits within past 7 days and meeting all other requirements     Today's Visits  Date Type Provider Dept   02/17/21 Telemedicine Christin Bydr MD [facility] 18 today's visits and meeting all other requirements     Future Appointments  No visits were found meeting these conditions  Showing future appointments within next 150 days and meeting all other requirements        The patient was identified by name and date of birth  Luis Ball was informed that this is a telemedicine visit and that the visit is being conducted through [employer] and patient was informed that this is a secure, HIPAA-compliant platform  She agrees to proceed     My office door was closed  No one else was in the room  She acknowledged consent and understanding of privacy and security of the video platform  The patient has agreed to participate and understands they can discontinue the visit at any time      Patient is aware this is a billable service  Valorie Alvarez is a 35 y o  female with MDD and BESSY  Stephanie remains compliant with her medications and denies side effects  She denies recent health changes or new medications  She stated her anxiety has been better controlled and she has not been needing to take Alprazolam as often any longer  She feels her mood has been stable on current medication regimen and wishes to continue current treatment  She will need prescription refills and she agrees to schedule follow up in 3 months or sooner if needed  HPI     Past Medical History:   Diagnosis Date    Abnormal Pap smear of cervix     Anxiety     Arthritis     Depression     Fibromyalgia, primary     Migraine     Vitamin B12 deficiency        Past Surgical History:   Procedure Laterality Date    COLONOSCOPY N/A 5/8/2018    Procedure: COLONOSCOPY;  Surgeon: India Saavedra MD;  Location: [facility]; Service: Gastroenterology    TONSILLECTOMY      WISDOM TOOTH EXTRACTION         Current Outpatient Medications   Medication Sig Dispense Refill    ALPRAZolam (XANAX) 0 5 mg tablet Take 1 tablet (0 5 mg total) by mouth daily as needed for anxiety 30 tablet 0    ARIPiprazole (ABILIFY) 2 mg tablet Take 1 tablet (2 mg total) by mouth daily 30 tablet 2    B-D 3CC LUER-DEANN SYR 25GX1" 25G X 1" 3 ML MISC       buPROPion (WELLBUTRIN XL) 150 mg 24 hr tablet Take 1 tablet (150 mg total) by mouth daily 30 tablet 2    Cholecalciferol (VITAMIN D3) 85457 units CAPS Take by mouth once a week      dexamethasone (DECADRON) 2 mg tablet 1 tab qam with food prn migraine  5 tablet 0    Erenumab-aooe 140 MG/ML SOAJ Inject 140 mg under the skin every 30 (thirty) days 1 pen 11    escitalopram (LEXAPRO) 10 mg tablet Take 1 tablet (10 mg total) by mouth daily 30 tablet 2    etonogestrel-ethinyl estradiol (NuvaRing) 0 12-0 015 MG/24HR vaginal ring Insert ring for 21 days then remove for one week   3 each 3    indomethacin (INDOCIN) 25 mg capsule 1-2 tabs BID PRN migraine WITH FOOD  Hold toradol  15 capsule 2    ketorolac (TORADOL) 10 mg tablet Take 1 tablet (10 mg total) by mouth every 6 (six) hours as needed (migraine) Max 2-3 per week  10 tablet 2    melatonin 1 mg 1-2 tabs qhs prn insomnia 60 tablet 2    onabotulinumtoxin A (BOTOX) 100 units Inject as directed      pregabalin (LYRICA) 25 mg capsule Take 1 capsule (25 mg total) by mouth 2 (two) times a day 60 capsule 3    Syringe/Needle, Disp, (SYRINGE 3CC/53IQ1-3/4") 27G X 1-1/4" 3 ML MISC Use for IM injection of ketorolac   (Patient not taking: Reported on 12/21/2020) 4 each 0    vitamin B-12 (VITAMIN B-12) 1,000 mcg tablet Take 1,000 mcg by mouth daily       Current Facility-Administered Medications   Medication Dose Route Frequency Provider Last Rate Last Admin    cyanocobalamin injection 1,000 mcg  1,000 mcg Intramuscular Q30 Days Lannette Bacca, DO   1,000 mcg at 08/03/20 8349    cyanocobalamin injection 1,000 mcg  1,000 mcg Intramuscular Q14 Days Lannette Bacca, DO   1,000 mcg at 05/18/20 1146    cyanocobalamin injection 1,000 mcg  1,000 mcg Intramuscular Q30 Days Lannette Bacca, DO   1,000 mcg at 09/01/20 1129        Allergies   Allergen Reactions    Desvenlafaxine      Other reaction(s): state of confusion    Valproic Acid Other (See Comments)     Other reaction(s): dilated pupils, "schizi"    Tizanidine Anxiety       Review of Systems     Mood Anxiety and Depression   Behavior Normal    Thought Content Disturbing Thoughts, Feelings   General Emotional Problems and Decreased Functioning   Personality Normal   Other Psych Symptoms Normal   Constitutional Negative   ENT Negative   Cardiovascular Negative   Respiratory Negative   Gastrointestinal Negative   Genitourinary Negative   Musculoskeletal Negative   Integumentary Negative   Neurological Negative   Endocrine Normal    Other Symptoms Normal              Laboratory Results: No results found for this or any previous visit      Substance Abuse History:  Social History     Substance and Sexual Activity   Drug Use Yes    Types: Marijuana    Comment: Medical marijuana       Family Psychiatric History:   Family History   Problem Relation Age of Onset    Rheum arthritis Mother     Psoriasis Mother     Other Mother     Hypertension Mother     Diabetes unspecified Mother     Sjogren's syndrome Mother     Alcohol abuse Mother     Drug abuse Mother     Anxiety disorder Father     Alcohol abuse Father     Cancer Other     Diabetes Other     Other Other         High blood pressure    Depression Maternal Grandmother        The following portions of the patient's history were reviewed and updated as appropriate: allergies, current medications, past family history, past medical history, past social history, past surgical history and problem list     Social History     Socioeconomic History    Marital status: Single     Spouse name: Not on file    Number of children: 0    Years of education: 15    Highest education level: Not on file   Occupational History    Occupation: Unemployed     Employer: [employer]   Social Needs    Financial resource strain: Very hard    Food insecurity     Worry: Often true     Inability: Never true    Transportation needs     Medical: No     Non-medical: No   Tobacco Use    Smoking status: Never Smoker    Smokeless tobacco: Never Used   Substance and Sexual Activity    Alcohol use: No    Drug use: Yes     Types: Marijuana     Comment: Medical marijuana    Sexual activity: Yes     Partners: Male     Comment: Nuva ring   Lifestyle    Physical activity     Days per week: 0 days     Minutes per session: Not on file    Stress: Not on file   Relationships    Social connections     Talks on phone: Not on file     Gets together: Not on file     Attends Zoroastrian service: Not on file     Active member of club or organization: Not on file     Attends meetings of clubs or organizations: Not on file Relationship status: Not on file    Intimate partner violence     Fear of current or ex partner: Not on file     Emotionally abused: Not on file     Physically abused: Not on file     Forced sexual activity: Not on file   Other Topics Concern    Not on file   Social History Narrative    Home:  Living with mom and MGM        Education:    Pt denies any h/o learning disability Dxs but admits to having great difficulty in math requiring a   She reached childhood milestones on time as far as he knows  Graduated HS 2009    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 college art classes--she stopped due to anxiety from dissatisfaction with her classes--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 felt they were telling her what to create  On reflection, she feels it was moreso her anxiety as the cause of her leaving school, and she was using the other reason as an excuse so she would not have to admit to anxiety at that time  She is now very open about her anxiety and does not mind that I have this information in the general social section of her chart  Social History     Social History Narrative    Home:  Living with mom and MGM        Education:    Pt denies any h/o learning disability Dxs but admits to having great difficulty in math requiring a   She reached childhood milestones on time as far as he knows  Graduated HS 2009    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 college art classes--she stopped due to anxiety from dissatisfaction with her classes--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 felt they were telling her what to create  On reflection, she feels it was moreso her anxiety as the cause of her leaving school, and she was using the other reason as an excuse so she would not have to admit to anxiety at that time    She is now very open about her anxiety and does not mind that I have this information in the general social section of her chart        Objective:       Mental status:  Appearance calm and cooperative , adequate hygiene and grooming and good eye contact    Mood dysphoric   Affect affect was constricted   Speech a normal rate and fluent   Thought Processes coherent/organized and normal thought processes   Hallucinations no hallucinations present    Thought Content no delusions   Abnormal Thoughts no suicidal thoughts  and no homicidal thoughts    Orientation  oriented to person and place and time   Remote Memory short term memory intact and long term memory intact   Attention Span concentration impaired   Intellect Appears to be of Average Intelligence   Insight Limited insight   Judgement judgment was limited   Muscle Strength n/a   Language no difficulty naming common objects and no difficulty repeating a phrase    Fund of Knowledge displays adequate knowledge of current events               Assessment/Plan:       Diagnoses and all orders for this visit:    Depression, major, recurrent, moderate (HCC)    Fibromyalgia  -     ARIPiprazole (ABILIFY) 2 mg tablet; Take 1 tablet (2 mg total) by mouth daily    Generalized anxiety disorder  -     buPROPion (WELLBUTRIN XL) 150 mg 24 hr tablet; Take 1 tablet (150 mg total) by mouth daily            Treatment Recommendations- Risks Benefits      Immediate Medical/Psychiatric/Psychotherapy Treatments and Any Precautions: continue current treatment     Risks, Benefits And Possible Side Effects Of Medications:  {PSYCH RISK, BENEFITS AND POSSIBLE SIDE EFFECTS (Optional):23495    Controlled Medication Discussion: Discussed with patient Black Box warning on concurrent use of benzodiazepines and opioid medications including sedation, respiratory depression, coma and death  Patient understands the risk of treatment with benzodiazepines in addition to opioids and wants to continue taking those medications   , Discussed with patient the risks of sedation, respiratory depression, impairment of ability to drive and potential for abuse and addiction related to treatment with benzodiazepine medications  The patient understands risk of treatment with benzodiazepine medications, agrees to not drive if feels impaired and agrees to take medications as prescribed  and The patient has been filling controlled prescriptions on time as prescribed to [facility]       Psychotherapy Provided:     Individual psychotherapy provided: No        I spent 20 minutes directly with the patient during this visit      VIRTUAL VISIT [address] acknowledges that she has consented to an online visit or consultation  She understands that the online visit is based solely on information provided by her, and that, in the absence of a face-to-face physical evaluation by the physician, the diagnosis she receives is both limited and provisional in terms of accuracy and completeness  This is not intended to replace a full medical face-to-face evaluation by the physician  Jeanine Washington understands and accepts these terms

## 2021-02-18 ENCOUNTER — TELEMEDICINE (OUTPATIENT)
Dept: BEHAVIORAL/MENTAL HEALTH CLINIC | Facility: CLINIC | Age: 31
End: 2021-02-18
Payer: COMMERCIAL

## 2021-02-18 DIAGNOSIS — F41.1 GENERALIZED ANXIETY DISORDER: Chronic | ICD-10-CM

## 2021-02-18 DIAGNOSIS — F33.1 DEPRESSION, MAJOR, RECURRENT, MODERATE (HCC): Primary | Chronic | ICD-10-CM

## 2021-02-18 DIAGNOSIS — G43.709 CHRONIC MIGRAINE WITHOUT AURA WITHOUT STATUS MIGRAINOSUS, NOT INTRACTABLE: ICD-10-CM

## 2021-02-18 PROCEDURE — 90834 PSYTX W PT 45 MINUTES: CPT | Performed by: SOCIAL WORKER

## 2021-02-18 NOTE — PSYCH
Virtual Regular Visit    This note was not shared with the patient due to this is a psychotherapy note      Assessment/Plan:    Problem List Items Addressed This Visit        Other    Generalized anxiety disorder (Chronic)    Depression, major, recurrent, moderate ([address] ) - Primary (Chronic)           Reason for visit is Behavioral Health session, conducted through video, due to COVID-19 precautions  Marco Antonio Murray has verbalized a preference to continue with virtual sessions at this time  Marco Antonio Murray has been offered in-person sessions and has declined  Encounter provider APARNA Vergara    Provider located at [address] [place] [id number] [phone]      Recent Visits  Date Type Provider Dept   02/11/21 8001 Alie Low DO [address] [facility] recent visits within past 7 days and meeting all other requirements     Future Appointments  No visits were found meeting these conditions  Showing future appointments within next 150 days and meeting all other requirements        The patient was identified by name and date of birth  Jailene Pool was informed that this is a telemedicine visit and that the visit is being conducted through [employer] and patient was informed that this is a secure, HIPAA-compliant platform  She agrees to proceed     My office door was closed  No one else was in the room  She acknowledged consent and understanding of privacy and security of the video platform  The patient has agreed to participate and understands they can discontinue the visit at any time  Patient is aware this is a billable service  Luis Vann is a 35 y o  female  DATA: Met with Stephanie for scheduled individual session  Topics of discussion included physical health issues; compulsive spending; relationship issues; desire to hold a job   Robert De Leon states that she currently has a migraine headache, and we discussed her treatment and her treatment for her rheumatological disease  Michael Prescott states that she has noted increased spending  We spent some time talking about the triggers and prompting events that lead to increased spending  She states that her boyfriend has told her that she has "an unhealthy obsession with materialistic things " She states that she goes through phases of different items that help her cope with things  She states that she has a credit card that belongs to her mother  Her mother does not set a limit for her  We discussed ways that she can ask her boyfriend to help her with setting limits  She states that she wants to work on improving her nutrition  She notes that she has gained at least 20 pounds  She states that she "can't wait to walk again " She states that she needs for the temperature to be at least 50 degrees, because her rheum condition is impacted by the cold  We problem-solved ways that she can increase her physical activity inside her home  Client shows evidence of utilizing some mindfulness-based skills to manage mental health symptoms  During this session, this clinician used the following therapeutic modalities: supportive psychotherapy, client-centered therapy, mindfulness-based strategies, DBT-informed skills, Motivational Interviewing and solution-focused therapy  Clinician provided psychoeducation regarding use of various ways to increase motivation to make behavior change  The clinician assigned the following for the client to complete prior to the next session: increase use of mindfulness-based strategies to help increase behavior change  Stephanie rates her current level of physical pain as a 6/10 (due to migraine headache)  ASSESSMENT: Michael Prescott presents with a somewhat dysthymic mood  Her affect is normal range and intensity, appropriate  Michael Prescott exhibits good therapeutic rapport with this clinician   Michael Prescott continues to exhibit willingness to work on treatment goals and objectives  Elizabeth Garcias presents with a minimal risk of suicide, minimal risk of self-harm, and minimal risk of harm to others  PLAN: Elizabeth Garcias will return in two weeks for the next scheduled session  Between sessions, Elizabeth Garcias will work on increasing use of mindfulness-based practices and will discuss how her boyfriend can increase supportive language  She will report back during the next session re: successes and barriers  At the next session, this clinician will use supportive psychotherapy, client-centered therapy, mindfulness-based strategies, DBT-informed skills, Motivational Interviewing and solution-focused therapy to address her mood regulation, physical activity, compulsive spending, and relationship issues, in an effort to assist Elizabeth Garcias with meeting treatment goals  HPI     Past Medical History:   Diagnosis Date    Abnormal Pap smear of cervix     Anxiety     Arthritis     Depression     Fibromyalgia, primary     Migraine     Vitamin B12 deficiency        Past Surgical History:   Procedure Laterality Date    COLONOSCOPY N/A 5/8/2018    Procedure: COLONOSCOPY;  Surgeon: Niels Hong MD;  Location: [facility]; Service: Gastroenterology    TONSILLECTOMY      WISDOM TOOTH EXTRACTION         Current Outpatient Medications   Medication Sig Dispense Refill    ALPRAZolam (XANAX) 0 5 mg tablet Take 1 tablet (0 5 mg total) by mouth daily as needed for anxiety 30 tablet 0    ARIPiprazole (ABILIFY) 2 mg tablet Take 1 tablet (2 mg total) by mouth daily 30 tablet 2    B-D 3CC LUER-DEANN SYR 25GX1" 25G X 1" 3 ML MISC       buPROPion (WELLBUTRIN XL) 150 mg 24 hr tablet Take 1 tablet (150 mg total) by mouth daily 30 tablet 2    Cholecalciferol (VITAMIN D3) 88183 units CAPS Take by mouth once a week      dexamethasone (DECADRON) 2 mg tablet 1 tab qam with food prn migraine   5 tablet 0    Erenumab-aooe 140 MG/ML SOAJ Inject 140 mg under the skin every 30 (thirty) days 1 pen 11    escitalopram (LEXAPRO) 10 mg tablet Take 1 tablet (10 mg total) by mouth daily 30 tablet 2    etonogestrel-ethinyl estradiol (NuvaRing) 0 12-0 015 MG/24HR vaginal ring Insert ring for 21 days then remove for one week  3 each 3    indomethacin (INDOCIN) 25 mg capsule 1-2 tabs BID PRN migraine WITH FOOD  Hold toradol  15 capsule 2    ketorolac (TORADOL) 10 mg tablet Take 1 tablet (10 mg total) by mouth every 6 (six) hours as needed (migraine) Max 2-3 per week  10 tablet 2    melatonin 1 mg 1-2 tabs qhs prn insomnia 60 tablet 2    onabotulinumtoxin A (BOTOX) 100 units Inject as directed      pregabalin (LYRICA) 25 mg capsule Take 1 capsule (25 mg total) by mouth 2 (two) times a day 60 capsule 3    Syringe/Needle, Disp, (SYRINGE 3CC/80GX3-3/4") 27G X 1-1/4" 3 ML MISC Use for IM injection of ketorolac  (Patient not taking: Reported on 12/21/2020) 4 each 0    vitamin B-12 (VITAMIN B-12) 1,000 mcg tablet Take 1,000 mcg by mouth daily       Current Facility-Administered Medications   Medication Dose Route Frequency Provider Last Rate Last Admin    cyanocobalamin injection 1,000 mcg  1,000 mcg Intramuscular Q30 Days Marylouise Comoran, DO   1,000 mcg at 08/03/20 2222    cyanocobalamin injection 1,000 mcg  1,000 mcg Intramuscular Q14 Days Marylouise Comoran, DO   1,000 mcg at 05/18/20 1146    cyanocobalamin injection 1,000 mcg  1,000 mcg Intramuscular Q30 Days Marylouise Comoran, DO   1,000 mcg at 09/01/20 1129        Allergies   Allergen Reactions    Desvenlafaxine      Other reaction(s): state of confusion    Valproic Acid Other (See Comments)     Other reaction(s): dilated pupils, "schizi"    Tizanidine Anxiety       Review of Systems    Video Exam    There were no vitals filed for this visit  Physical Exam     I spent 50 minutes directly with the patient during this visit      VIRTUAL VISIT [address] acknowledges that she has consented to an online visit or consultation   She understands that the online visit is based solely on information provided by her, and that, in the absence of a face-to-face physical evaluation by the physician, the diagnosis she receives is both limited and provisional in terms of accuracy and completeness  This is not intended to replace a full medical face-to-face evaluation by the physician  Erin Redman understands and accepts these terms

## 2021-02-19 ENCOUNTER — TELEPHONE (OUTPATIENT)
Dept: PSYCHIATRY | Facility: CLINIC | Age: 31
End: 2021-02-19

## 2021-02-19 RX ORDER — KETOROLAC TROMETHAMINE 10 MG/1
10 TABLET, FILM COATED ORAL EVERY 6 HOURS PRN
Qty: 10 TABLET | Refills: 0 | Status: SHIPPED | OUTPATIENT
Start: 2021-02-19 | End: 2021-04-05 | Stop reason: SDUPTHER

## 2021-02-19 NOTE — TELEPHONE ENCOUNTER
Left message for Judi Pérez and/or Parent/Guardian to call office back at [phone] to schedule appointment with Radha Perez MD     Reason:   3 mo f/u    Last completed appointment with provider:   2/17/21

## 2021-02-25 ENCOUNTER — TELEMEDICINE (OUTPATIENT)
Dept: BEHAVIORAL/MENTAL HEALTH CLINIC | Facility: CLINIC | Age: 31
End: 2021-02-25
Payer: COMMERCIAL

## 2021-02-25 DIAGNOSIS — F41.1 GENERALIZED ANXIETY DISORDER: ICD-10-CM

## 2021-02-25 DIAGNOSIS — F41.1 GENERALIZED ANXIETY DISORDER: Chronic | ICD-10-CM

## 2021-02-25 DIAGNOSIS — F33.1 DEPRESSION, MAJOR, RECURRENT, MODERATE (HCC): Primary | Chronic | ICD-10-CM

## 2021-02-25 PROCEDURE — 90834 PSYTX W PT 45 MINUTES: CPT | Performed by: SOCIAL WORKER

## 2021-02-25 RX ORDER — ALPRAZOLAM 0.5 MG/1
0.5 TABLET ORAL DAILY PRN
Qty: 30 TABLET | Refills: 0 | Status: SHIPPED | OUTPATIENT
Start: 2021-02-25 | End: 2021-07-14 | Stop reason: SDUPTHER

## 2021-03-01 NOTE — PSYCH
Virtual Regular Visit    This note was not shared with the patient due to this is a psychotherapy note    Assessment/Plan:    Problem List Items Addressed This Visit        Other    Generalized anxiety disorder (Chronic)    Depression, major, recurrent, moderate (HCC) - Primary (Chronic)               Reason for visit is No chief complaint on file  Encounter provider APARNA Dutton    Provider located at [address] [place] [id number] [phone]      Recent Visits  No visits were found meeting these conditions  Showing recent visits within past 7 days and meeting all other requirements     Future Appointments  No visits were found meeting these conditions  Showing future appointments within next 150 days and meeting all other requirements        The patient was identified by name and date of birth  José Miguel Carr was informed that this is a telemedicine visit and that the visit is being conducted through [employer] and patient was informed that this is a secure, HIPAA-compliant platform  She agrees to proceed     My office door was closed  No one else was in the room  She acknowledged consent and understanding of privacy and security of the video platform  The patient has agreed to participate and understands they can discontinue the visit at any time  Patient is aware this is a billable service  Marvin Hoyt is a 35 y o  female  DATA: Met with Stephanie for scheduled individual session  Topics of discussion included family relationships  Stephanie requested a session due to a conflict in her family  She states that her uncle, who has mental health issues, found out that Stephanie's boyfriend was staying in the home  He has forbidden her from seeing her boyfriend and has been acting in a controlling manner  We discussed options for her to maintain emotional safety   She has discussed the situation with her mother  She agreed to also discuss her concerns with her grandmother  She states that she does not feel physically unsafe  She feels that he is being unreasonable in his expectations for her  She discussed some potential options for briefly leaving the house and staying with friends  She is unsure whether or not she will do this  She states that her mother is supportive of her  We discussed self-care routines and agreed to follow up next week  Client shows evidence of utilizing some DBT-informed skills to manage mental health symptoms  During this session, this clinician used the following therapeutic modalities: supportive psychotherapy, client-centered therapy, mindfulness-based strategies, DBT-informed skills, Motivational Interviewing and solution-focused therapy  Clinician provided psychoeducation regarding use of mindfulness to help manage moods  The clinician assigned the following for the client to complete prior to the next session: practice improved self-care routine  ASSESSMENT: Natalia Walsh presents with a primarily dysthymic mood  Her affect is normal range and intensity, appropriate  Natalia Walsh exhibits good therapeutic rapport with this clinician  Natalia Walsh continues to exhibit willingness to work on treatment goals and objectives  Natalia Walsh presents with a minimal risk of suicide, minimal risk of self-harm, and minimal risk of harm to others  PLAN: Natalia Walsh will return in one week for the next scheduled session  Between sessions, Natalia Walsh will maintain healthy self-care routine and will report back during the next session re: successes and barriers  At the next session, this clinician will use supportive psychotherapy, client-centered therapy, mindfulness-based strategies, DBT-informed skills, Motivational Interviewing and solution-focused therapy to address her mood regulation and family relationship issues, in an effort to assist Natalia Walsh with meeting treatment goals  HPI     Past Medical History:   Diagnosis Date    Abnormal Pap smear of cervix     Anxiety     Arthritis     Depression     Fibromyalgia, primary     Migraine     Vitamin B12 deficiency        Past Surgical History:   Procedure Laterality Date    COLONOSCOPY N/A 5/8/2018    Procedure: COLONOSCOPY;  Surgeon: Jc Sanches MD;  Location: [facility]; Service: Gastroenterology    TONSILLECTOMY      WISDOM TOOTH EXTRACTION         Current Outpatient Medications   Medication Sig Dispense Refill    ALPRAZolam (XANAX) 0 5 mg tablet Take 1 tablet (0 5 mg total) by mouth daily as needed for anxiety 30 tablet 0    ARIPiprazole (ABILIFY) 2 mg tablet Take 1 tablet (2 mg total) by mouth daily 30 tablet 2    B-D 3CC LUER-DEANN SYR 25GX1" 25G X 1" 3 ML MISC       buPROPion (WELLBUTRIN XL) 150 mg 24 hr tablet Take 1 tablet (150 mg total) by mouth daily 30 tablet 2    Cholecalciferol (VITAMIN D3) 02181 units CAPS Take by mouth once a week      dexamethasone (DECADRON) 2 mg tablet 1 tab qam with food prn migraine  5 tablet 0    Erenumab-aooe 140 MG/ML SOAJ Inject 140 mg under the skin every 30 (thirty) days 1 pen 11    escitalopram (LEXAPRO) 10 mg tablet Take 1 tablet (10 mg total) by mouth daily 30 tablet 2    etonogestrel-ethinyl estradiol (NuvaRing) 0 12-0 015 MG/24HR vaginal ring Insert ring for 21 days then remove for one week  3 each 3    indomethacin (INDOCIN) 25 mg capsule 1-2 tabs BID PRN migraine WITH FOOD  Hold toradol  15 capsule 2    ketorolac (TORADOL) 10 mg tablet Take 1 tablet (10 mg total) by mouth every 6 (six) hours as needed (migraine) Max 2-3 per week   10 tablet 0    melatonin 1 mg 1-2 tabs qhs prn insomnia 60 tablet 2    onabotulinumtoxin A (BOTOX) 100 units Inject as directed      pregabalin (LYRICA) 25 mg capsule Take 1 capsule (25 mg total) by mouth 2 (two) times a day 60 capsule 3    Syringe/Needle, Disp, (SYRINGE 3CC/56NI1-7/4") 27G X 1-1/4" 3 ML MISC Use for IM injection of ketorolac  (Patient not taking: Reported on 12/21/2020) 4 each 0    vitamin B-12 (VITAMIN B-12) 1,000 mcg tablet Take 1,000 mcg by mouth daily       Current Facility-Administered Medications   Medication Dose Route Frequency Provider Last Rate Last Admin    cyanocobalamin injection 1,000 mcg  1,000 mcg Intramuscular Q30 Days Bernell Brian, DO   1,000 mcg at 08/03/20 5133    cyanocobalamin injection 1,000 mcg  1,000 mcg Intramuscular Q14 Days Bernell Brian, DO   1,000 mcg at 05/18/20 1146    cyanocobalamin injection 1,000 mcg  1,000 mcg Intramuscular Q30 Days Bernell Brian, DO   1,000 mcg at 09/01/20 1129        Allergies   Allergen Reactions    Desvenlafaxine      Other reaction(s): state of confusion    Valproic Acid Other (See Comments)     Other reaction(s): dilated pupils, "schizi"    Tizanidine Anxiety       Review of Systems    Video Exam    There were no vitals filed for this visit  Physical Exam     I spent 50 minutes directly with the patient during this visit      VIRTUAL VISIT [address] acknowledges that she has consented to an online visit or consultation  She understands that the online visit is based solely on information provided by her, and that, in the absence of a face-to-face physical evaluation by the physician, the diagnosis she receives is both limited and provisional in terms of accuracy and completeness  This is not intended to replace a full medical face-to-face evaluation by the physician  Honey Martinez understands and accepts these terms

## 2021-03-03 ENCOUNTER — TELEMEDICINE (OUTPATIENT)
Dept: BEHAVIORAL/MENTAL HEALTH CLINIC | Facility: CLINIC | Age: 31
End: 2021-03-03
Payer: COMMERCIAL

## 2021-03-03 DIAGNOSIS — F33.1 DEPRESSION, MAJOR, RECURRENT, MODERATE (HCC): Primary | Chronic | ICD-10-CM

## 2021-03-03 DIAGNOSIS — F41.1 GENERALIZED ANXIETY DISORDER: Chronic | ICD-10-CM

## 2021-03-03 PROCEDURE — 90834 PSYTX W PT 45 MINUTES: CPT | Performed by: SOCIAL WORKER

## 2021-03-08 NOTE — PSYCH
Virtual Regular Visit    This note was not shared with the patient due to this is a psychotherapy note    Assessment/Plan:    Problem List Items Addressed This Visit        Other    Generalized anxiety disorder (Chronic)    Depression, major, recurrent, moderate (HCC) - Primary (Chronic)               Reason for visit is No chief complaint on file  Encounter provider APARNA Reyes    Provider located at [address] [place], [place] [address]  [facility] [id number] [phone]      Recent Visits  No visits were found meeting these conditions  Showing recent visits within past 7 days and meeting all other requirements     Future Appointments  No visits were found meeting these conditions  Showing future appointments within next 150 days and meeting all other requirements        The patient was identified by name and date of birth  Whitlashdeven Villarreal was informed that this is a telemedicine visit and that the visit is being conducted through [employer] and patient was informed that this is a secure, HIPAA-compliant platform  She agrees to proceed     My office door was closed  No one else was in the room  She acknowledged consent and understanding of privacy and security of the video platform  The patient has agreed to participate and understands they can discontinue the visit at any time  Patient is aware this is a billable service  Deandra Biswas is a 35 y o  female  DATA: Met with Stephanie for scheduled individual session  Topics of discussion included family stressors, relationship with boyfriend, physical health issues  Hui [place] states that her uncle has not said anything regarding her not being allowed out of the house since last week  She states that she hopes that she will be able to have her boyfriend visit again soon   She states that she is doing better than last week, but she is still feeling upset about the situation  She states that her physical symptoms are still problematic for her  She discussed how much she misses being able to work  Natalia Walsh states that she wants to start to work on increasing her physical activity again  Client shows evidence of utilizing some basic mindfulness skills to manage mental health symptoms  During this session, this clinician used the following therapeutic modalities: supportive psychotherapy, client-centered therapy, mindfulness-based strategies, DBT-informed skills, Motivational Interviewing and solution-focused therapy  Clinician provided psychoeducation regarding use of increased physical activity, combined with mindfulness, as a means to improve one's mood  The clinician assigned the following for the client to complete prior to the next session: increase use of mindfulness-based strategies  ASSESSMENT: Natalia Walsh presents with a somewhat dysthymic mood  Her affect is normal range and intensity, appropriate  Natalia Walsh exhibits good therapeutic rapport with this clinician  Natalia Walsh continues to exhibit willingness to work on treatment goals and objectives  Natalia Walsh presents with a minimal risk of suicide, minimal risk of self-harm, and minimal risk of harm to others  PLAN: Natalia Walsh will return in two weeks for the next scheduled session  Between sessions, Natalia Walsh will continue to work on building her mindfulness practice and will report back during the next session re: successes and barriers  At the next session, this clinician will use supportive psychotherapy, client-centered therapy, mindfulness-based strategies, DBT-informed skills, Motivational Interviewing and solution-focused therapy to address her mood regulation and relationship issues, in an effort to assist Natalia Walsh with meeting treatment goals           HPI     Past Medical History:   Diagnosis Date    Abnormal Pap smear of cervix     Anxiety     Arthritis     Depression     Fibromyalgia, primary     Migraine     Vitamin B12 deficiency        Past Surgical History:   Procedure Laterality Date    COLONOSCOPY N/A 5/8/2018    Procedure: COLONOSCOPY;  Surgeon: Raz Newell MD;  Location: [facility]; Service: Gastroenterology    TONSILLECTOMY      WISDOM TOOTH EXTRACTION         Current Outpatient Medications   Medication Sig Dispense Refill    ALPRAZolam (XANAX) 0 5 mg tablet Take 1 tablet (0 5 mg total) by mouth daily as needed for anxiety 30 tablet 0    ARIPiprazole (ABILIFY) 2 mg tablet Take 1 tablet (2 mg total) by mouth daily 30 tablet 2    B-D 3CC LUER-DEANN SYR 25GX1" 25G X 1" 3 ML MISC       buPROPion (WELLBUTRIN XL) 150 mg 24 hr tablet Take 1 tablet (150 mg total) by mouth daily 30 tablet 2    Cholecalciferol (VITAMIN D3) 32153 units CAPS Take by mouth once a week      dexamethasone (DECADRON) 2 mg tablet 1 tab qam with food prn migraine  5 tablet 0    Erenumab-aooe 140 MG/ML SOAJ Inject 140 mg under the skin every 30 (thirty) days 1 pen 11    escitalopram (LEXAPRO) 10 mg tablet Take 1 tablet (10 mg total) by mouth daily 30 tablet 2    etonogestrel-ethinyl estradiol (NuvaRing) 0 12-0 015 MG/24HR vaginal ring Insert ring for 21 days then remove for one week  3 each 3    indomethacin (INDOCIN) 25 mg capsule 1-2 tabs BID PRN migraine WITH FOOD  Hold toradol  15 capsule 2    ketorolac (TORADOL) 10 mg tablet Take 1 tablet (10 mg total) by mouth every 6 (six) hours as needed (migraine) Max 2-3 per week  10 tablet 0    melatonin 1 mg 1-2 tabs qhs prn insomnia 60 tablet 2    onabotulinumtoxin A (BOTOX) 100 units Inject as directed      pregabalin (LYRICA) 25 mg capsule Take 1 capsule (25 mg total) by mouth 2 (two) times a day 60 capsule 3    Syringe/Needle, Disp, (SYRINGE 3CC/66OQ6-3/4") 27G X 1-1/4" 3 ML MISC Use for IM injection of ketorolac   (Patient not taking: Reported on 12/21/2020) 4 each 0    vitamin B-12 (VITAMIN B-12) 1,000 mcg tablet Take 1,000 mcg by mouth daily       Current Facility-Administered Medications   Medication Dose Route Frequency Provider Last Rate Last Admin    cyanocobalamin injection 1,000 mcg  1,000 mcg Intramuscular Q30 Days Marcellina Ruts, DO   1,000 mcg at 08/03/20 2618    cyanocobalamin injection 1,000 mcg  1,000 mcg Intramuscular Q14 Days Marcellina Ruts, DO   1,000 mcg at 05/18/20 1146    cyanocobalamin injection 1,000 mcg  1,000 mcg Intramuscular Q30 Days Marcellina Ruts, DO   1,000 mcg at 09/01/20 1129        Allergies   Allergen Reactions    Desvenlafaxine      Other reaction(s): state of confusion    Valproic Acid Other (See Comments)     Other reaction(s): dilated pupils, "schizi"    Tizanidine Anxiety       Review of Systems    Video Exam    There were no vitals filed for this visit  Physical Exam     I spent 50 minutes directly with the patient during this visit      VIRTUAL VISIT [address] [address] acknowledges that she has consented to an online visit or consultation  She understands that the online visit is based solely on information provided by her, and that, in the absence of a face-to-face physical evaluation by the physician, the diagnosis she receives is both limited and provisional in terms of accuracy and completeness  This is not intended to replace a full medical face-to-face evaluation by the physician  Rock Villarreal understands and accepts these terms

## 2021-03-10 ENCOUNTER — DOCUMENTATION (OUTPATIENT)
Dept: NEUROLOGY | Facility: CLINIC | Age: 31
End: 2021-03-10

## 2021-03-10 DIAGNOSIS — Z23 ENCOUNTER FOR IMMUNIZATION: ICD-10-CM

## 2021-03-10 NOTE — PROGRESS NOTES
Patient is scheduled with Geneva Oliveira on  4/20/2021 in the [facility] [place]

## 2021-03-10 NOTE — PROGRESS NOTES
Type Date User Summary Attachment   General 03/10/2021  8:39 AM Sarath Mayer care coordination  -   Note    Botox- authorization #: [id number]- valid for 4 visits- from 4/1/2021 until 4/1/2022   Please use our stock      Thank you,     Mel Prieto

## 2021-03-17 ENCOUNTER — TELEMEDICINE (OUTPATIENT)
Dept: BEHAVIORAL/MENTAL HEALTH CLINIC | Facility: CLINIC | Age: 31
End: 2021-03-17
Payer: COMMERCIAL

## 2021-03-17 ENCOUNTER — OFFICE VISIT (OUTPATIENT)
Dept: FAMILY MEDICINE CLINIC | Facility: CLINIC | Age: 31
End: 2021-03-17
Payer: COMMERCIAL

## 2021-03-17 ENCOUNTER — TELEPHONE (OUTPATIENT)
Dept: FAMILY MEDICINE CLINIC | Facility: CLINIC | Age: 31
End: 2021-03-17

## 2021-03-17 VITALS
BODY MASS INDEX: 40.57 KG/M2 | TEMPERATURE: 99.1 F | WEIGHT: 229 LBS | DIASTOLIC BLOOD PRESSURE: 92 MMHG | SYSTOLIC BLOOD PRESSURE: 128 MMHG | HEIGHT: 63 IN

## 2021-03-17 DIAGNOSIS — F41.1 GENERALIZED ANXIETY DISORDER: Chronic | ICD-10-CM

## 2021-03-17 DIAGNOSIS — R35.0 FREQUENCY OF URINATION: ICD-10-CM

## 2021-03-17 DIAGNOSIS — F33.1 DEPRESSION, MAJOR, RECURRENT, MODERATE (HCC): Primary | Chronic | ICD-10-CM

## 2021-03-17 DIAGNOSIS — N30.90 CYSTITIS: Primary | ICD-10-CM

## 2021-03-17 DIAGNOSIS — E66.9 OBESITY (BMI 30-39.9): ICD-10-CM

## 2021-03-17 LAB
SL AMB  POCT GLUCOSE, UA: NEGATIVE
SL AMB LEUKOCYTE ESTERASE,UA: 70
SL AMB POCT BILIRUBIN,UA: 1
SL AMB POCT BLOOD,UA: NEGATIVE
SL AMB POCT CLARITY,UA: CLEAR
SL AMB POCT COLOR,UA: YELLOW
SL AMB POCT KETONES,UA: NEGATIVE
SL AMB POCT NITRITE,UA: NEGATIVE
SL AMB POCT PH,UA: 6.5
SL AMB POCT SPECIFIC GRAVITY,UA: 1.01
SL AMB POCT URINE PROTEIN: 15
SL AMB POCT UROBILINOGEN: 0.2

## 2021-03-17 PROCEDURE — 3008F BODY MASS INDEX DOCD: CPT | Performed by: FAMILY MEDICINE

## 2021-03-17 PROCEDURE — 81002 URINALYSIS NONAUTO W/O SCOPE: CPT | Performed by: FAMILY MEDICINE

## 2021-03-17 PROCEDURE — 90834 PSYTX W PT 45 MINUTES: CPT | Performed by: SOCIAL WORKER

## 2021-03-17 PROCEDURE — 3725F SCREEN DEPRESSION PERFORMED: CPT | Performed by: FAMILY MEDICINE

## 2021-03-17 PROCEDURE — 1036F TOBACCO NON-USER: CPT | Performed by: FAMILY MEDICINE

## 2021-03-17 PROCEDURE — 99213 OFFICE O/P EST LOW 20 MIN: CPT | Performed by: FAMILY MEDICINE

## 2021-03-17 RX ORDER — AMPICILLIN 500 MG/1
500 CAPSULE ORAL 3 TIMES DAILY
Qty: 15 CAPSULE | Refills: 0 | Status: SHIPPED | OUTPATIENT
Start: 2021-03-17 | End: 2021-03-22

## 2021-03-17 RX ORDER — PHENTERMINE HYDROCHLORIDE 37.5 MG/1
37.5 TABLET ORAL DAILY
Qty: 30 TABLET | Refills: 2 | Status: SHIPPED | OUTPATIENT
Start: 2021-03-17 | End: 2021-09-03

## 2021-03-17 NOTE — PROGRESS NOTES
Assessment/Plan:       Diagnoses and all orders for this visit:    Cystitis  -     ampicillin (PRINCIPEN) 500 mg capsule; Take 1 capsule (500 mg total) by mouth 3 (three) times a day for 5 days    Frequency of urination  -     Urine culture; Future  -     Urine culture  -     POCT urine dip    Obesity (BMI 30-39 9)  -     phentermine (ADIPEX-P) 37 5 MG tablet; Take 1 tablet (37 5 mg total) by mouth daily            Subjective:        Patient ID: Danielle Soriano is a 35 y o  female  Patient is here with dysuria as well as increased urinary frequency and urgency over the past 2 days  Patient also wishes medication for weight loss due to history of obesity and recent weight gain  Patient use phentermine in the past with good results  No fevers or chills nausea vomiting or change in stool habits  The following portions of the patient's history were reviewed and updated as appropriate: allergies, current medications, past family history, past medical history, past social history, past surgical history and problem list       Review of Systems   Constitutional: Negative  HENT: Negative  Eyes: Negative  Respiratory: Negative  Cardiovascular: Negative  Gastrointestinal: Negative  Endocrine: Negative  Genitourinary: Positive for difficulty urinating, frequency and urgency  Negative for hematuria and vaginal discharge  Musculoskeletal: Negative  Skin: Negative  Allergic/Immunologic: Negative  Neurological: Negative  Hematological: Negative  Psychiatric/Behavioral: Negative  Objective:               /92 (BP Location: Right arm, Patient Position: Sitting, Cuff Size: Adult)   Temp 99 1 °F (37 3 °C) (Tympanic)   Ht 5' 3" (1 6 m)   Wt 104 kg (229 lb)   BMI 40 57 kg/m²          Physical Exam  Vitals signs and nursing note reviewed  Constitutional:       General: She is not in acute distress  Appearance: Normal appearance   She is not ill-appearing, toxic-appearing or diaphoretic  HENT:      Head: Normocephalic and atraumatic  Eyes:      General: No scleral icterus  Right eye: No discharge  Left eye: No discharge  Extraocular Movements: Extraocular movements intact  Conjunctiva/sclera: Conjunctivae normal       Pupils: Pupils are equal, round, and reactive to light  Neck:      Musculoskeletal: Normal range of motion and neck supple  No neck rigidity or muscular tenderness  Vascular: No carotid bruit  Cardiovascular:      Rate and Rhythm: Normal rate and regular rhythm  Pulses: Normal pulses  Heart sounds: Normal heart sounds  No murmur  No friction rub  No gallop  Pulmonary:      Effort: Pulmonary effort is normal  No respiratory distress  Breath sounds: Normal breath sounds  No stridor  No wheezing, rhonchi or rales  Chest:      Chest wall: No tenderness  Abdominal:      General: Abdomen is flat  Bowel sounds are normal  There is no distension  Palpations: Abdomen is soft  Tenderness: There is no abdominal tenderness  There is no guarding or rebound  Musculoskeletal: Normal range of motion  General: No swelling, tenderness, deformity or signs of injury  Right lower leg: No edema  Left lower leg: No edema  Lymphadenopathy:      Cervical: No cervical adenopathy  Skin:     General: Skin is warm and dry  Capillary Refill: Capillary refill takes less than 2 seconds  Coloration: Skin is not jaundiced  Findings: No bruising, erythema, lesion or rash  Neurological:      General: No focal deficit present  Mental Status: She is alert and oriented to person, place, and time  Cranial Nerves: No cranial nerve deficit  Sensory: No sensory deficit  Motor: No weakness        Coordination: Coordination normal       Gait: Gait normal    Psychiatric:         Mood and Affect: Mood normal          Behavior: Behavior normal          Thought Content: Thought content normal          Judgment: Judgment normal

## 2021-03-17 NOTE — TELEPHONE ENCOUNTER
Patients pharmacy called in stating the phentermine is not covered by insurance and they are wondering if you would like a different medication called in or for us to do a prior auth  Please review and advise  Thank you

## 2021-03-17 NOTE — TELEPHONE ENCOUNTER
Contacted patient and she would like Prior Berta Fay  Initiate, I will notify Swathi Dodson to due prior auth

## 2021-03-17 NOTE — PSYCH
Virtual Regular Visit    This note was not shared with the patient due to this is a psychotherapy note    Assessment/Plan:    Problem List Items Addressed This Visit        Other    Generalized anxiety disorder (Chronic)    Depression, major, recurrent, moderate ([facility] [address] ) - Primary (Chronic)           Reason for visit is Behavioral Health session, conducted through video, due to COVID-19 precautions  Shahnaz Kendrick has verbalized a preference to continue with virtual sessions at this time  Shahnaz Kendrick has been offered in-person sessions and has declined  Encounter provider APARNA Townsend    Provider located at [address] [place] [id number] [phone]      Recent Visits  No visits were found meeting these conditions  Showing recent visits within past 7 days and meeting all other requirements     Future Appointments  No visits were found meeting these conditions  Showing future appointments within next 150 days and meeting all other requirements        The patient was identified by name and date of birth  Edermary Dottie was informed that this is a telemedicine visit and that the visit is being conducted through [employer] and patient was informed that this is a secure, HIPAA-compliant platform  She agrees to proceed     My office door was closed  No one else was in the room  She acknowledged consent and understanding of privacy and security of the video platform  The patient has agreed to participate and understands they can discontinue the visit at any time  Patient is aware this is a billable service  Giselle President is a 35 y o  female  DATA: Met with Stephanie for scheduled individual session  Topics of discussion included anxiety; physical health symptoms; family relationships; relationship with SO; nutrition and exercise   Akil Victor states that she has been feeling an increase in symptoms of anxiety--most notably GI symptoms of nausea and diarrhea  She states that her anxiety is caused by family stress and stress related to not being able to see her boyfriend  She discussed her relationship with her SO and her anger with his unwillingness to get the covid vaccine  We talked about her physical health issues and her plans to improve nutrition and increase activity, as she wants to lose weight  Client shows evidence of utilizing some mindfulness skills to manage mental health symptoms  During this session, this clinician used the following therapeutic modalities: supportive psychotherapy, client-centered therapy, mindfulness-based strategies, DBT-informed skills, Motivational Interviewing and solution-focused therapy  Clinician provided psychoeducation regarding use of mindfulness skills (e g , finding an area of focus external to her)  ASSESSMENT: Alvin Marquis presents with a somewhat anxious and dysthymic mood  her affect is normal range and intensity, appropriate  Alvin Marquis exhibits good therapeutic rapport with this clinician  Alvin Marquis continues to exhibit willingness to work on treatment goals and objectives  Alvin Marquis presents with a minimal risk of suicide, minimal risk of self-harm, and minimal risk of harm to others  PLAN: Alvin Marquis will return in two weeks for the next scheduled session  Between sessions, Alvin Marquis will continue to practice mindfulness and increase physical activity and will report back during the next session re: successes and barriers  At the next session, this clinician will use supportive psychotherapy, client-centered therapy, mindfulness-based strategies, DBT-informed skills, Motivational Interviewing and solution-focused therapy to address her mood regulation and relationship issues, in an effort to assist Alvin Marquis with meeting treatment goals               HPI     Past Medical History:   Diagnosis Date    Abnormal Pap smear of cervix     Anxiety     Arthritis     Depression     Fibromyalgia, primary     Migraine     Vitamin B12 deficiency        Past Surgical History:   Procedure Laterality Date    COLONOSCOPY N/A 5/8/2018    Procedure: COLONOSCOPY;  Surgeon: Silvia Arauz MD;  Location: [facility]; Service: Gastroenterology    TONSILLECTOMY      WISDOM TOOTH EXTRACTION         Current Outpatient Medications   Medication Sig Dispense Refill    ALPRAZolam (XANAX) 0 5 mg tablet Take 1 tablet (0 5 mg total) by mouth daily as needed for anxiety 30 tablet 0    ARIPiprazole (ABILIFY) 2 mg tablet Take 1 tablet (2 mg total) by mouth daily 30 tablet 2    B-D 3CC LUER-DEANN SYR 25GX1" 25G X 1" 3 ML MISC       buPROPion (WELLBUTRIN XL) 150 mg 24 hr tablet Take 1 tablet (150 mg total) by mouth daily 30 tablet 2    Cholecalciferol (VITAMIN D3) 94178 units CAPS Take by mouth once a week      dexamethasone (DECADRON) 2 mg tablet 1 tab qam with food prn migraine  5 tablet 0    Erenumab-aooe 140 MG/ML SOAJ Inject 140 mg under the skin every 30 (thirty) days 1 pen 11    escitalopram (LEXAPRO) 10 mg tablet Take 1 tablet (10 mg total) by mouth daily 30 tablet 2    etonogestrel-ethinyl estradiol (NuvaRing) 0 12-0 015 MG/24HR vaginal ring Insert ring for 21 days then remove for one week  3 each 3    indomethacin (INDOCIN) 25 mg capsule 1-2 tabs BID PRN migraine WITH FOOD  Hold toradol  15 capsule 2    ketorolac (TORADOL) 10 mg tablet Take 1 tablet (10 mg total) by mouth every 6 (six) hours as needed (migraine) Max 2-3 per week  10 tablet 0    melatonin 1 mg 1-2 tabs qhs prn insomnia 60 tablet 2    onabotulinumtoxin A (BOTOX) 100 units Inject as directed      pregabalin (LYRICA) 25 mg capsule Take 1 capsule (25 mg total) by mouth 2 (two) times a day 60 capsule 3    Syringe/Needle, Disp, (SYRINGE 3CC/13MX1-6/4") 27G X 1-1/4" 3 ML MISC Use for IM injection of ketorolac   (Patient not taking: Reported on 12/21/2020) 4 each 0    vitamin B-12 (VITAMIN B-12) 1,000 mcg tablet Take 1,000 mcg by mouth daily       Current Facility-Administered Medications   Medication Dose Route Frequency Provider Last Rate Last Admin    cyanocobalamin injection 1,000 mcg  1,000 mcg Intramuscular Q30 Days Miles So, DO   1,000 mcg at 08/03/20 6877    cyanocobalamin injection 1,000 mcg  1,000 mcg Intramuscular Q14 Days Miles So, DO   1,000 mcg at 05/18/20 1146    cyanocobalamin injection 1,000 mcg  1,000 mcg Intramuscular Q30 Days Miles So, DO   1,000 mcg at 09/01/20 1129        Allergies   Allergen Reactions    Desvenlafaxine      Other reaction(s): state of confusion    Valproic Acid Other (See Comments)     Other reaction(s): dilated pupils, "schizi"    Tizanidine Anxiety       Review of Systems    Video Exam    There were no vitals filed for this visit  Physical Exam     I spent 50 minutes directly with the patient during this visit      VIRTUAL VISIT [address] acknowledges that she has consented to an online visit or consultation  She understands that the online visit is based solely on information provided by her, and that, in the absence of a face-to-face physical evaluation by the physician, the diagnosis she receives is both limited and provisional in terms of accuracy and completeness  This is not intended to replace a full medical face-to-face evaluation by the physician  Lainey Mann understands and accepts these terms

## 2021-03-31 ENCOUNTER — APPOINTMENT (OUTPATIENT)
Dept: RADIOLOGY | Facility: CLINIC | Age: 31
End: 2021-03-31
Payer: COMMERCIAL

## 2021-03-31 ENCOUNTER — TELEMEDICINE (OUTPATIENT)
Dept: BEHAVIORAL/MENTAL HEALTH CLINIC | Facility: CLINIC | Age: 31
End: 2021-03-31
Payer: COMMERCIAL

## 2021-03-31 ENCOUNTER — OFFICE VISIT (OUTPATIENT)
Dept: URGENT CARE | Facility: CLINIC | Age: 31
End: 2021-03-31
Payer: COMMERCIAL

## 2021-03-31 VITALS
OXYGEN SATURATION: 97 % | WEIGHT: 220 LBS | RESPIRATION RATE: 18 BRPM | TEMPERATURE: 97.7 F | HEART RATE: 110 BPM | BODY MASS INDEX: 38.98 KG/M2 | DIASTOLIC BLOOD PRESSURE: 75 MMHG | SYSTOLIC BLOOD PRESSURE: 145 MMHG | HEIGHT: 63 IN

## 2021-03-31 DIAGNOSIS — F41.1 GENERALIZED ANXIETY DISORDER: Chronic | ICD-10-CM

## 2021-03-31 DIAGNOSIS — M79.671 RIGHT FOOT PAIN: ICD-10-CM

## 2021-03-31 DIAGNOSIS — M79.671 RIGHT FOOT PAIN: Primary | ICD-10-CM

## 2021-03-31 DIAGNOSIS — F33.1 DEPRESSION, MAJOR, RECURRENT, MODERATE (HCC): Primary | Chronic | ICD-10-CM

## 2021-03-31 PROCEDURE — G0382 LEV 3 HOSP TYPE B ED VISIT: HCPCS | Performed by: NURSE PRACTITIONER

## 2021-03-31 PROCEDURE — 73630 X-RAY EXAM OF FOOT: CPT

## 2021-03-31 PROCEDURE — 99203 OFFICE O/P NEW LOW 30 MIN: CPT | Performed by: NURSE PRACTITIONER

## 2021-03-31 PROCEDURE — 99283 EMERGENCY DEPT VISIT LOW MDM: CPT | Performed by: NURSE PRACTITIONER

## 2021-03-31 PROCEDURE — 90834 PSYTX W PT 45 MINUTES: CPT | Performed by: SOCIAL WORKER

## 2021-03-31 RX ORDER — NAPROXEN 500 MG/1
500 TABLET ORAL 2 TIMES DAILY WITH MEALS
Qty: 14 TABLET | Refills: 0 | Status: SHIPPED | OUTPATIENT
Start: 2021-03-31 | End: 2021-07-16

## 2021-03-31 NOTE — PSYCH
Virtual Regular Visit    This note was not shared with the patient due to this is a psychotherapy note    Assessment/Plan:    Problem List Items Addressed This Visit        Other    Generalized anxiety disorder (Chronic)    Depression, major, recurrent, moderate ([address] ) - Primary (Chronic)           Reason for visit is Behavioral Health session, conducted through video, due to COVID-19 precautions  Guy Lennon has verbalized a preference to continue with virtual sessions at this time  Guy Lennon has been offered in-person sessions and has declined  Encounter provider APARNA Brannon    Provider located at [address] [place] [id number] [phone]      Recent Visits  No visits were found meeting these conditions  Showing recent visits within past 7 days and meeting all other requirements     Future Appointments  No visits were found meeting these conditions  Showing future appointments within next 150 days and meeting all other requirements        The patient was identified by name and date of birth  Jose Mistry was informed that this is a telemedicine visit and that the visit is being conducted through [employer] and patient was informed that this is a secure, HIPAA-compliant platform  She agrees to proceed     My office door was closed  No one else was in the room  She acknowledged consent and understanding of privacy and security of the video platform  The patient has agreed to participate and understands they can discontinue the visit at any time  Patient is aware this is a billable service  Wojciech Beaver is a 35 y o  female  GOAL ADDRESSED: Goal #1    DATA: Met with Stephanie for scheduled individual session  Topics of discussion included health update; relationship with her SO; plans to return to volunteer work   Brenton Pratt states that she got the second dose of her vaccine and is "officially off grounding in two weeks " She states that her SO has continued to argue with her regarding his willingness to get the vaccine  Jim Fortune states that she feels that she would need to end the relationship if he refuses to get the vaccine  She states that she feels that she would be able to follow through  Jim Fortune states that she started her weight loss medication and has started walking  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, once per week, to help care for the cats  She is hoping to build her endurance, so she can return to work  Client shows evidence of utilizing mindfulness-based skills to manage mental health symptoms  During this session, this clinician used the following therapeutic modalities: supportive psychotherapy, client-centered therapy, mindfulness-based strategies, DBT-informed skills, Motivational Interviewing and solution-focused therapy  Clinician provided psychoeducation regarding use of various types of mindful meditation  The clinician assigned the following for the client to complete prior to the next session: increase her use of mindfulness-based strategies  We completed the BESSY-7 (score of 14) and PHQ-9 (score of 15) screenings, which are attached to this service  ASSESSMENT: Jim Fortune presents with a primarily euthymic mood  her affect is normal range and intensity, appropriate  Jim Fortune exhibits good therapeutic rapport with this clinician  Jim Fortune continues to exhibit willingness to work on treatment goals and objectives  Jim Fortune presents with a minimal risk of suicide, minimal risk of self-harm, and minimal risk of harm to others  PLAN: Jim Fortune will return in two weeks for the next scheduled session  Between sessions, Jim Fortune will continue to increase her physical activity  She will also continue to practice mindfulness-based strategies to manage her moods and anxiety and will report back during the next session re: successes and barriers   At the next session, this clinician will use supportive psychotherapy, client-centered therapy, mindfulness-based strategies, DBT-informed skills, Motivational Interviewing and solution-focused therapy to address her mood regulation and relationship concerns, in an effort to assist Alvin Marquis with meeting treatment goals  HPI     Past Medical History:   Diagnosis Date    Abnormal Pap smear of cervix     Anxiety     Arthritis     Depression     Fibromyalgia, primary     Migraine     Vitamin B12 deficiency        Past Surgical History:   Procedure Laterality Date    COLONOSCOPY N/A 5/8/2018    Procedure: COLONOSCOPY;  Surgeon: Ty Brambila MD;  Location: [facility] GI [facility]; Service: Gastroenterology    TONSILLECTOMY      WISDOM TOOTH EXTRACTION         Current Outpatient Medications   Medication Sig Dispense Refill    ALPRAZolam (XANAX) 0 5 mg tablet Take 1 tablet (0 5 mg total) by mouth daily as needed for anxiety 30 tablet 0    ARIPiprazole (ABILIFY) 2 mg tablet Take 1 tablet (2 mg total) by mouth daily 30 tablet 2    B-D 3CC LUER-DEANN SYR 25GX1" 25G X 1" 3 ML MISC       buPROPion (WELLBUTRIN XL) 150 mg 24 hr tablet Take 1 tablet (150 mg total) by mouth daily 30 tablet 2    Cholecalciferol (VITAMIN D3) 10327 units CAPS Take by mouth once a week      dexamethasone (DECADRON) 2 mg tablet 1 tab qam with food prn migraine  5 tablet 0    Erenumab-aooe 140 MG/ML SOAJ Inject 140 mg under the skin every 30 (thirty) days 1 pen 11    escitalopram (LEXAPRO) 10 mg tablet Take 1 tablet (10 mg total) by mouth daily 30 tablet 2    etonogestrel-ethinyl estradiol (NuvaRing) 0 12-0 015 MG/24HR vaginal ring Insert ring for 21 days then remove for one week  3 each 3    indomethacin (INDOCIN) 25 mg capsule 1-2 tabs BID PRN migraine WITH FOOD  Hold toradol  15 capsule 2    ketorolac (TORADOL) 10 mg tablet Take 1 tablet (10 mg total) by mouth every 6 (six) hours as needed (migraine) Max 2-3 per week   10 tablet 0    melatonin 1 mg 1-2 tabs qhs prn insomnia 60 tablet 2    onabotulinumtoxin A (BOTOX) 100 units Inject as directed      phentermine (ADIPEX-P) 37 5 MG tablet Take 1 tablet (37 5 mg total) by mouth daily 30 tablet 2    pregabalin (LYRICA) 25 mg capsule Take 1 capsule (25 mg total) by mouth 2 (two) times a day 60 capsule 3    Syringe/Needle, Disp, (SYRINGE 3CC/41FD8-7/4") 27G X 1-1/4" 3 ML MISC Use for IM injection of ketorolac  (Patient not taking: Reported on 12/21/2020) 4 each 0    vitamin B-12 (VITAMIN B-12) 1,000 mcg tablet Take 1,000 mcg by mouth daily       Current Facility-Administered Medications   Medication Dose Route Frequency Provider Last Rate Last Admin    cyanocobalamin injection 1,000 mcg  1,000 mcg Intramuscular Q30 Days Justin Boroughs, DO   1,000 mcg at 08/03/20 8670    cyanocobalamin injection 1,000 mcg  1,000 mcg Intramuscular Q14 Days Justin Boroughs, DO   1,000 mcg at 05/18/20 1146    cyanocobalamin injection 1,000 mcg  1,000 mcg Intramuscular Q30 Days Justin Boroughs, DO   1,000 mcg at 09/01/20 1129        Allergies   Allergen Reactions    Desvenlafaxine      Other reaction(s): state of confusion    Valproic Acid Other (See Comments)     Other reaction(s): dilated pupils, "schizi"    Tizanidine Anxiety       Review of Systems    Video Exam    There were no vitals filed for this visit  Physical Exam     I spent 50 minutes directly with the patient during this visit      VIRTUAL VISIT [address] acknowledges that she has consented to an online visit or consultation  She understands that the online visit is based solely on information provided by her, and that, in the absence of a face-to-face physical evaluation by the physician, the diagnosis she receives is both limited and provisional in terms of accuracy and completeness  This is not intended to replace a full medical face-to-face evaluation by the physician  Sheryl Malik understands and accepts these terms

## 2021-03-31 NOTE — PROGRESS NOTES
[facility]        NAME: Luis Ball is a 35 y o  female  : 1990    MRN: [id number]  DATE: 2021  TIME: 11:07 AM    Assessment and Plan   Right foot pain [M79 671]  1  Right foot pain  XR foot 3+ vw right         Patient Instructions     Patient Instructions     No acute abnormality on x-ray  Rest   Ice every 3-4 hours for 20 minutes  Wear supportive shoes  Ace wrap applied in office  Naproxen as needed for pain  Follow up with Podiatry Orthopedics if no improvement over the next 3-5 days  Go to the ER with any worsening symptoms  Chief Complaint     Chief Complaint   Patient presents with    Foot Pain     Right foot, pain goes up leg  X 3 days ago         History of Present Illness   Cruz SERGIO Wilmer Corea presents to the clinic c/o      This is a 78-year-old female here today with complaints of right foot pain  She states pain started 2-3 days ago  She does note she was stretching prior to walking  She was stretching by standing on her toes and flexing her foot back  She states she recently got back to walking  She states she is walking less than a mi at a time  She did walk last year but over when her was not walking  Pain radiates the top of her foot  She has been trying ice and heat which did not help  Over the counter medications have not helped  Review of Systems   Review of Systems   Constitutional: Negative  Respiratory: Negative  Cardiovascular: Negative  Musculoskeletal: Positive for arthralgias  Psychiatric/Behavioral: Negative            Current Medications     Long-Term Medications   Medication Sig Dispense Refill    ALPRAZolam (XANAX) 0 5 mg tablet Take 1 tablet (0 5 mg total) by mouth daily as needed for anxiety 30 tablet 0    ARIPiprazole (ABILIFY) 2 mg tablet Take 1 tablet (2 mg total) by mouth daily 30 tablet 2    B-D 3CC LUER-DEANN SYR 25GX1" 25G X 1" 3 ML MISC       buPROPion (WELLBUTRIN XL) 150 mg 24 hr tablet Take 1 tablet (150 mg total) by mouth daily 30 tablet 2    escitalopram (LEXAPRO) 10 mg tablet Take 1 tablet (10 mg total) by mouth daily 30 tablet 2    indomethacin (INDOCIN) 25 mg capsule 1-2 tabs BID PRN migraine WITH FOOD  Hold toradol  15 capsule 2    ketorolac (TORADOL) 10 mg tablet Take 1 tablet (10 mg total) by mouth every 6 (six) hours as needed (migraine) Max 2-3 per week  10 tablet 0    phentermine (ADIPEX-P) 37 5 MG tablet Take 1 tablet (37 5 mg total) by mouth daily 30 tablet 2    pregabalin (LYRICA) 25 mg capsule Take 1 capsule (25 mg total) by mouth 2 (two) times a day 60 capsule 3    Syringe/Needle, Disp, (SYRINGE 3CC/22LP9-1/4") 27G X 1-1/4" 3 ML MISC Use for IM injection of ketorolac  4 each 0    etonogestrel-ethinyl estradiol (NuvaRing) 0 12-0 015 MG/24HR vaginal ring Insert ring for 21 days then remove for one week  3 each 3       Current Allergies     Allergies as of 03/31/2021 - Reviewed 03/31/2021   Allergen Reaction Noted    Desvenlafaxine  11/26/2012    Valproic acid Other (See Comments) 10/18/2017    Tizanidine Anxiety 01/20/2021            The following portions of the patient's history were reviewed and updated as appropriate: allergies, current medications, past family history, past medical history, past social history, past surgical history and problem list     Objective   /75   Pulse (!) 110   Temp 97 7 °F (36 5 °C) (Temporal)   Resp 18   Ht 5' 3" (1 6 m)   Wt 99 8 kg (220 lb)   SpO2 97%   BMI 38 97 kg/m²        Physical Exam     Physical Exam  Vitals signs and nursing note reviewed  Constitutional:       Appearance: Normal appearance  Cardiovascular:      Rate and Rhythm: Normal rate and regular rhythm  Pulses: Normal pulses  Heart sounds: Normal heart sounds  Pulmonary:      Effort: Pulmonary effort is normal       Breath sounds: Normal breath sounds  Musculoskeletal:      Comments: Right foot:  Tenderness to top over the top of the foot    There is no bruising or swelling  Normal strength  No decreased range of motion  Neurological:      Mental Status: She is alert and oriented to person, place, and time  Psychiatric:         Mood and Affect: Mood normal          Behavior: Behavior normal          Thought Content: Thought content normal          Judgment: Judgment normal          Right foot x-ray:  No acute abnormality

## 2021-04-05 DIAGNOSIS — M79.7 FIBROMYALGIA: ICD-10-CM

## 2021-04-05 DIAGNOSIS — G43.709 CHRONIC MIGRAINE WITHOUT AURA WITHOUT STATUS MIGRAINOSUS, NOT INTRACTABLE: ICD-10-CM

## 2021-04-05 RX ORDER — ARIPIPRAZOLE 2 MG/1
2 TABLET ORAL DAILY
Qty: 30 TABLET | Refills: 2 | Status: SHIPPED | OUTPATIENT
Start: 2021-04-05 | End: 2021-08-03 | Stop reason: SDUPTHER

## 2021-04-05 RX ORDER — KETOROLAC TROMETHAMINE 10 MG/1
10 TABLET, FILM COATED ORAL EVERY 6 HOURS PRN
Qty: 10 TABLET | Refills: 0 | Status: SHIPPED | OUTPATIENT
Start: 2021-04-05 | End: 2021-05-05 | Stop reason: SDUPTHER

## 2021-04-07 DIAGNOSIS — G43.709 CHRONIC MIGRAINE WITHOUT AURA WITHOUT STATUS MIGRAINOSUS, NOT INTRACTABLE: ICD-10-CM

## 2021-04-10 RX ORDER — ERENUMAB-AOOE 140 MG/ML
INJECTION, SOLUTION SUBCUTANEOUS
Qty: 1 PEN | Refills: 10 | Status: SHIPPED | OUTPATIENT
Start: 2021-04-10 | End: 2022-03-15

## 2021-04-13 DIAGNOSIS — F41.1 GENERALIZED ANXIETY DISORDER: ICD-10-CM

## 2021-04-13 RX ORDER — BUPROPION HYDROCHLORIDE 150 MG/1
150 TABLET ORAL DAILY
Qty: 30 TABLET | Refills: 2 | Status: SHIPPED | OUTPATIENT
Start: 2021-04-13 | End: 2021-08-10 | Stop reason: SDUPTHER

## 2021-04-14 ENCOUNTER — TELEMEDICINE (OUTPATIENT)
Dept: BEHAVIORAL/MENTAL HEALTH CLINIC | Facility: CLINIC | Age: 31
End: 2021-04-14
Payer: COMMERCIAL

## 2021-04-14 DIAGNOSIS — F41.1 GENERALIZED ANXIETY DISORDER: Chronic | ICD-10-CM

## 2021-04-14 DIAGNOSIS — F33.1 DEPRESSION, MAJOR, RECURRENT, MODERATE (HCC): Primary | Chronic | ICD-10-CM

## 2021-04-14 PROCEDURE — 90834 PSYTX W PT 45 MINUTES: CPT | Performed by: SOCIAL WORKER

## 2021-04-14 NOTE — PSYCH
Virtual Regular Visit    This note was not shared with the patient due to this is a psychotherapy note    Assessment/Plan:    Problem List Items Addressed This Visit        Other    Generalized anxiety disorder (Chronic)    Depression, major, recurrent, moderate ([address] ) - Primary (Chronic)           Reason for visit is Behavioral Health session, conducted through video, due to COVID-19 precautions  Oswaldo Escamilla has verbalized a preference to continue with virtual sessions at this time  Oswaldo Escamilla has been offered in-person sessions and has declined  Encounter provider APARNA Moore    Provider located at [address] [place] [phone]      Recent Visits  No visits were found meeting these conditions  Showing recent visits within past 7 days and meeting all other requirements     Future Appointments  No visits were found meeting these conditions  Showing future appointments within next 150 days and meeting all other requirements        The patient was identified by name and date of birth  Amauri Leisure was informed that this is a telemedicine visit and that the visit is being conducted through [employer] and patient was informed that this is a secure, HIPAA-compliant platform  She agrees to proceed     My office door was closed  No one else was in the room  She acknowledged consent and understanding of privacy and security of the video platform  The patient has agreed to participate and understands they can discontinue the visit at any time  Patient is aware this is a billable service  Emma Bautista is a 35 y o  female  Goals addressed: Goal #1     DATA: Met with Stephanie for scheduled individual session  Topics of discussion included relationship concerns with SO, grandmother's recent hospitalization, physical health issues, family relationships   Michelle Villasenor states that she and her SO have been having some minor conflicts  "He told me that he doesn't know anything about me " She states that he has considered moving to [facility]  She has no interest in being in a long-distance relationship  Oli Acevedo gave a brief update of her medical issues  She discussed the effects of her phentermine (for weight loss)  She states that she has more energy  She does not notice an increase in anxiety; however, she does notice a difficulty with maintaining a train of thought  We discussed the use of mindfulness to help with this  She has purchased a lava lamp to help her practice her mindfulness exercises  Oli Acevedo discussed her grandmother needing to have hernia surgery  She states that she was in the hospital for two days  She is back home now  Oli Acevedo discussed family relationships and the codependence that her mother has on her grandmother and that she has on her mother  She states that there are parts of this that she would like to change  We will address in future sessions  Client shows evidence of utilizing mindfulness-based skills to manage mental health symptoms  During this session, this clinician used the following therapeutic modalities: supportive psychotherapy, client-centered therapy, mindfulness-based strategies, DBT-informed skills, Motivational Interviewing and solution-focused therapy  Clinician provided psychoeducation regarding use of mindfulness-based strategies to manage her "racing thoughts"  The clinician assigned the following for the client to complete prior to the next session: continue to practice mindful meditation and continue to maintain boundaries with her boyfriend  ASSESSMENT: Oli Acevedo presents with a primarily euthymic mood  her affect is normal range and intensity, appropriate  Oli Acevedo exhibits strong therapeutic rapport with this clinician  Oli Acevedo continues to exhibit willingness to work on treatment goals and objectives   Oli Acevedo presents with a minimal risk of suicide, minimal risk of self-harm, and minimal risk of harm to others  PLAN: Agustin Abarca will return in two weeks for the next scheduled session  Between sessions, Agustin Abarca will continue to work on mindfulness-based techniques to manage her anxiety and will report back during the next session re: successes and barriers  At the next session, this clinician will use supportive psychotherapy, client-centered therapy, mindfulness-based strategies, DBT-informed skills, Motivational Interviewing and solution-focused therapy to address her mood regulation and relationship concerns, in an effort to assist Agustin Abarca with meeting treatment goals  HPI     Past Medical History:   Diagnosis Date    Abnormal Pap smear of cervix     Anxiety     Arthritis     Depression     Fibromyalgia, primary     Migraine     Vitamin B12 deficiency        Past Surgical History:   Procedure Laterality Date    COLONOSCOPY N/A 5/8/2018    Procedure: COLONOSCOPY;  Surgeon: Johann Mcintyre MD;  [facility]; Service: Gastroenterology    TONSILLECTOMY      WISDOM TOOTH EXTRACTION         Current Outpatient Medications   Medication Sig Dispense Refill    Aimovig 140 MG/ML SOAJ Inject 140mg under the skin every 30 (thirty) days  1 pen 10    ALPRAZolam (XANAX) 0 5 mg tablet Take 1 tablet (0 5 mg total) by mouth daily as needed for anxiety 30 tablet 0    ARIPiprazole (ABILIFY) 2 mg tablet Take 1 tablet (2 mg total) by mouth daily 30 tablet 2    B-D 3CC LUER-DEANN SYR 25GX1" 25G X 1" 3 ML MISC       buPROPion (WELLBUTRIN XL) 150 mg 24 hr tablet Take 1 tablet (150 mg total) by mouth daily 30 tablet 2    Cholecalciferol (VITAMIN D3) 65150 units CAPS Take by mouth once a week      dexamethasone (DECADRON) 2 mg tablet 1 tab qam with food prn migraine   5 tablet 0    escitalopram (LEXAPRO) 10 mg tablet Take 1 tablet (10 mg total) by mouth daily 30 tablet 2    etonogestrel-ethinyl estradiol (NuvaRing) 0 12-0 015 MG/24HR vaginal ring Insert ring for 21 days then remove for one week  3 each 3    indomethacin (INDOCIN) 25 mg capsule 1-2 tabs BID PRN migraine WITH FOOD  Hold toradol  15 capsule 2    ketorolac (TORADOL) 10 mg tablet Take 1 tablet (10 mg total) by mouth every 6 (six) hours as needed (migraine) Max 2-3 per week  10 tablet 0    melatonin 1 mg 1-2 tabs qhs prn insomnia 60 tablet 2    naproxen (NAPROSYN) 500 mg tablet Take 1 tablet (500 mg total) by mouth 2 (two) times a day with meals for 7 days 14 tablet 0    onabotulinumtoxin A (BOTOX) 100 units Inject as directed      phentermine (ADIPEX-P) 37 5 MG tablet Take 1 tablet (37 5 mg total) by mouth daily 30 tablet 2    pregabalin (LYRICA) 25 mg capsule Take 1 capsule (25 mg total) by mouth 2 (two) times a day 60 capsule 3    Syringe/Needle, Disp, (SYRINGE 3CC/76BX5-6/4") 27G X 1-1/4" 3 ML MISC Use for IM injection of ketorolac  4 each 0    vitamin B-12 (VITAMIN B-12) 1,000 mcg tablet Take 1,000 mcg by mouth daily       Current Facility-Administered Medications   Medication Dose Route Frequency Provider Last Rate Last Admin    cyanocobalamin injection 1,000 mcg  1,000 mcg Intramuscular Q30 Days Caren Howell, DO   1,000 mcg at 08/03/20 1252    cyanocobalamin injection 1,000 mcg  1,000 mcg Intramuscular Q14 Days Caren Howell, DO   1,000 mcg at 05/18/20 1146    cyanocobalamin injection 1,000 mcg  1,000 mcg Intramuscular Q30 Days Caren Howell, DO   1,000 mcg at 09/01/20 1129        Allergies   Allergen Reactions    Desvenlafaxine      Other reaction(s): state of confusion    Valproic Acid Other (See Comments)     Other reaction(s): dilated pupils, "schizi"    Tizanidine Anxiety       Review of Systems    Video Exam    There were no vitals filed for this visit  Physical Exam     I spent 50 minutes directly with the patient during this visit      VIRTUAL VISIT [id number] acknowledges that she has consented to an online visit or consultation   She understands that the online visit is based solely on information provided by her, and that, in the absence of a face-to-face physical evaluation by the physician, the diagnosis she receives is both limited and provisional in terms of accuracy and completeness  This is not intended to replace a full medical face-to-face evaluation by the physician  Shanae Alexander understands and accepts these terms

## 2021-04-20 ENCOUNTER — PROCEDURE VISIT (OUTPATIENT)
Dept: NEUROLOGY | Facility: CLINIC | Age: 31
End: 2021-04-20
Payer: COMMERCIAL

## 2021-04-20 VITALS — SYSTOLIC BLOOD PRESSURE: 125 MMHG | HEART RATE: 121 BPM | DIASTOLIC BLOOD PRESSURE: 90 MMHG | TEMPERATURE: 97.7 F

## 2021-04-20 DIAGNOSIS — G43.709 CHRONIC MIGRAINE WITHOUT AURA WITHOUT STATUS MIGRAINOSUS, NOT INTRACTABLE: Primary | ICD-10-CM

## 2021-04-20 PROCEDURE — 64615 CHEMODENERV MUSC MIGRAINE: CPT | Performed by: PHYSICIAN ASSISTANT

## 2021-04-20 NOTE — PROGRESS NOTES
Universal Protocol   Consent: Verbal consent obtained  Written consent obtained    Risks and benefits: risks, benefits and alternatives were discussed  Consent given by: patient  Patient understanding: patient states understanding of the procedure being performed  Patient consent: the patient's understanding of the procedure matches consent given  Procedure consent: procedure consent matches procedure scheduled        Chemodenervation     Date/Time 4/20/2021 9:13 AM     Performed by  Octaviano Oseguera PA-C     Authorized by Octaviano Oseguera PA-C        Pre-procedure details      Prepped With: Alcohol     Procedure details     Position:  Upright   Botox     Botox Type:  Type A    Brand:  Botox    mL's of Botulinum Toxin:  180    Final Concentration per CC:  100 units    Needle Gauge:  30 G 2 5 inch   Procedures     Botox Procedures: chronic headache      Indications: migraines     Injection Location      Head / Face:  L superior trapezius, R superior trapezius, L superior cervical paraspinal, R superior cervical paraspinal, L , R , procerus, L temporalis, R temporalis, R frontalis, L frontalis, R medial occipitalis and L medial occipitalis    L  injection amount:  5 unit(s)    R  injection amount:  5 unit(s)    L lateral frontalis:  5 unit(s)    R lateral frontalis:  5 unit(s)    L medial frontalis:  5 unit(s)    R medial frontalis:  5 unit(s)    L temporalis injection amount:  20 unit(s)    R temporalis injection amount:  20 unit(s)    Procerus injection amount:  5 unit(s)    L medial occipitalis injection amount:  15 unit(s)    R medial occipitalis injection amount:  15 unit(s)    L superior cervical paraspinal injection amount:  10 unit(s)    R superior cervical paraspinal injection amount:  10 unit(s)    L superior trapezius injection amount:  0 unit(s)    R superior trapezius injection amount:  0 unit(s)   Total Units     Total units used:  180    Total units discarded:  20 Post-procedure details      Chemodenervation:  Chronic migraine    Facial Nerve Location[de-identified]  Bilateral facial nerve    Patient tolerance of procedure: Tolerated well, no immediate complications   Comments      55 extra units in the temporoparietal regions b/l and headband region, medically necessary  Avoid traps b/l  The patient will call me if she has worsening headaches after this next Botox round  If so, will recommend Decadron, and if needed the next time she can take Decadron 2 days prior to next Botox to prevent headaches      Blood pressure 125/90, pulse (!) 121, temperature 97 7 °F (36 5 °C), temperature source Oral

## 2021-04-24 ENCOUNTER — OFFICE VISIT (OUTPATIENT)
Dept: URGENT CARE | Facility: CLINIC | Age: 31
End: 2021-04-24
Payer: COMMERCIAL

## 2021-04-24 VITALS
TEMPERATURE: 97.9 F | WEIGHT: 220 LBS | BODY MASS INDEX: 38.98 KG/M2 | DIASTOLIC BLOOD PRESSURE: 78 MMHG | RESPIRATION RATE: 18 BRPM | SYSTOLIC BLOOD PRESSURE: 141 MMHG | HEIGHT: 63 IN | OXYGEN SATURATION: 99 % | HEART RATE: 115 BPM

## 2021-04-24 DIAGNOSIS — M54.50 ACUTE LEFT-SIDED LOW BACK PAIN WITHOUT SCIATICA: Primary | ICD-10-CM

## 2021-04-24 PROCEDURE — 99213 OFFICE O/P EST LOW 20 MIN: CPT | Performed by: NURSE PRACTITIONER

## 2021-04-24 RX ORDER — METHOCARBAMOL 500 MG/1
500 TABLET, FILM COATED ORAL 3 TIMES DAILY PRN
Qty: 30 TABLET | Refills: 0 | Status: SHIPPED | OUTPATIENT
Start: 2021-04-24 | End: 2021-07-16

## 2021-04-24 RX ORDER — LIDOCAINE 50 MG/G
1 PATCH TOPICAL DAILY
Qty: 6 PATCH | Refills: 0 | Status: SHIPPED | OUTPATIENT
Start: 2021-04-24 | End: 2021-09-03

## 2021-04-24 RX ORDER — ACETAMINOPHEN 500 MG
1000 TABLET ORAL EVERY 6 HOURS PRN
Qty: 100 TABLET | Refills: 0 | Status: SHIPPED | OUTPATIENT
Start: 2021-04-24 | End: 2022-03-15

## 2021-04-24 NOTE — PATIENT INSTRUCTIONS
You have been prescribed tylenol, lidoderm patches and robaxin  The robaxin is for muscle spasms - do not drink alcohol or drive machinery while taking - take all medications as prescribed  You are to alternate ice and heat  Follow up with your PCP  Go to the ED if symptoms worsen    Back Pain   WHAT YOU NEED TO KNOW:   Back pain is common  It can be caused by many conditions, such as arthritis or the breakdown of spinal discs  Your risk for back pain is increased by injuries, lack of activity, or repeated bending and twisting  You may feel sore or stiff on one or both sides of your back  The pain may spread to your buttocks or thighs  DISCHARGE INSTRUCTIONS:   Return to the emergency department if:   · You have pain, numbness, or weakness in one or both legs  · Your pain becomes so severe that you cannot walk  · You cannot control your urine or bowel movements  · You have severe back pain with chest pain  · You have severe back pain, nausea, and vomiting  · You have severe back pain that spreads to your side or genital area  Contact your healthcare provider if:   · You have back pain that does not get better with rest and pain medicine  · You have a fever  · You have pain that worsens when you are on your back or when you rest     · You have pain that worsens when you cough or sneeze  · You lose weight without trying  · You have questions or concerns about your condition or care  Medicines:   · NSAIDs  help decrease swelling and pain  This medicine is available with or without a doctor's order  NSAIDs can cause stomach bleeding or kidney problems in certain people  If you take blood thinner medicine, always ask your healthcare provider if NSAIDs are safe for you  Always read the medicine label and follow directions  · Acetaminophen  decreases pain and fever  It is available without a doctor's order  Ask how much to take and how often to take it  Follow directions   Read the labels of all other medicines you are using to see if they also contain acetaminophen, or ask your doctor or pharmacist  Acetaminophen can cause liver damage if not taken correctly  Do not use more than 4 grams (4,000 milligrams) total of acetaminophen in one day  · Muscle relaxers  help decrease muscle spasms and back pain  · Prescription pain medicine  may be given  Ask your healthcare provider how to take this medicine safely  Some prescription pain medicines contain acetaminophen  Do not take other medicines that contain acetaminophen without talking to your healthcare provider  Too much acetaminophen may cause liver damage  Prescription pain medicine may cause constipation  Ask your healthcare provider how to prevent or treat constipation  · Take your medicine as directed  Contact your healthcare provider if you think your medicine is not helping or if you have side effects  Tell him or her if you are allergic to any medicine  Keep a list of the medicines, vitamins, and herbs you take  Include the amounts, and when and why you take them  Bring the list or the pill bottles to follow-up visits  Carry your medicine list with you in case of an emergency  How to manage your back pain:   · Apply ice  on your back for 15 to 20 minutes every hour or as directed  Use an ice pack, or put crushed ice in a plastic bag  Cover it with a towel before you apply it to your skin  Ice helps prevent tissue damage and decreases pain  · Apply heat  on your back for 20 to 30 minutes every 2 hours for as many days as directed  Heat helps decrease pain and muscle spasms  · Stay active  as much as you can without causing more pain  Bed rest could make your back pain worse  Avoid heavy lifting until your pain is gone  · Go to physical therapy as directed  A physical therapist can teach you exercises to help improve movement and strength, and to decrease pain      Follow up with your healthcare provider in 2 weeks, or as directed:  Write down your questions so you remember to ask them during your visits  © Copyright [address] Information is for End User's use only and may not be sold, redistributed or otherwise used for commercial purposes  All illustrations and images included in CareNotes® are the copyrighted property of A D A M , [employer]  or [employer]  The above information is an  only  It is not intended as medical advice for individual conditions or treatments  Talk to your doctor, nurse or pharmacist before following any medical regimen to see if it is safe and effective for you  Muscle Spasm   WHAT YOU NEED TO KNOW:   A muscle spasm is a sudden contraction of any muscle or group of muscles  A muscle cramp is a painful muscle spasm  Muscle cramps commonly occur after intense exercise or during pregnancy  They may also be caused by certain medications, dehydration, low calcium or magnesium levels, or another medical condition  DISCHARGE INSTRUCTIONS:   Medicines: You may need the following:  · NSAIDs  help decrease swelling and pain or fever  This medicine is available with or without a doctor's order  NSAIDs can cause stomach bleeding or kidney problems in certain people  If you take blood thinner medicine, always ask your healthcare provider if NSAIDs are safe for you  Always read the medicine label and follow directions  · Take your medicine as directed  Contact your healthcare provider if you think your medicine is not helping or if you have side effects  Tell him of her if you are allergic to any medicine  Keep a list of the medicines, vitamins, and herbs you take  Include the amounts, and when and why you take them  Bring the list or the pill bottles to follow-up visits  Carry your medicine list with you in case of an emergency  Follow up with your healthcare provider as directed: You may need other tests or treatment   You may also be referred to a physical therapist or other specialist  Write down your questions so you remember to ask them during your visits  Self-care:   · Stretch  your muscle to help relieve the cramp  It may be helpful to keep your muscle in the stretched position until the cramp is gone  · Apply heat  to help decrease pain and muscle spasms  Apply heat on the area for 20 to 30 minutes every 2 hours for as many days as directed  · Apply ice  to help decrease swelling and pain  Ice may also help prevent tissue damage  Use an ice pack, or put crushed ice in a plastic bag  Cover it with a towel and place it on your muscle for 15 to 20 minutes every hour or as directed  · Drink more liquids  to help prevent muscle cramps caused by dehydration  Sports drinks may help replace electrolytes you lose through sweat during exercise  Ask your healthcare provider how much liquid to drink each day and which liquids are best for you  · Eat healthy foods , such as fruits, vegetables, whole grains, low-fat dairy products, and lean proteins (meat, beans, and fish)  If you are pregnant, ask your healthcare provider about foods that are high in magnesium and sodium  They may help to relieve cramps during pregnancy  · Massage your muscle  to help relieve the cramp  · Take frequent deep breaths  until the cramp feels better  Lie down while you take the deep breaths so you do not get dizzy or lightheaded  Contact your healthcare provider if:   · You have signs of dehydration, such as a headache, dark yellow urine, dry eyes or mouth, or a fast heartbeat  · You have questions or concerns about your condition or care  Return to the emergency department if:   · You have warmth, swelling, or redness in the cramping muscle  · You have frequent or unrelieved muscle cramps in several different muscles  · You have muscle cramps with numbness, tingling, and burning in your hands and feet      © Copyright [employer] 2020 Information is for End User's use only and may not be sold, redistributed or otherwise used for commercial purposes  All illustrations and images included in CareNotes® are the copyrighted property of A D A M [employer] Inc  or [employer]  The above information is an  only  It is not intended as medical advice for individual conditions or treatments  Talk to your doctor, nurse or pharmacist before following any medical regimen to see if it is safe and effective for you

## 2021-04-24 NOTE — PROGRESS NOTES
[facility]        NAME: Aliyah Reid is a 35 y o  female  : 1990    MRN: [id number]  DATE: 2021  TIME: 3:28 PM    Assessment and Plan   Acute left-sided low back pain without sciatica [M54 5]  1  Acute left-sided low back pain without sciatica  lidocaine (LIDODERM) 5 %    methocarbamol (ROBAXIN) 500 mg tablet    acetaminophen (TYLENOL) 500 mg tablet         Patient Instructions       Follow up with PCP in 3-5 days  Proceed to  ER if symptoms worsen  You have been prescribed tylenol, lidoderm patches and robaxin  The robaxin is for muscle spasms - do not drink alcohol or drive machinery while taking - take all medications as prescribed  You are to alternate ice and heat  Follow up with your PCP  Go to the ED if symptoms worsen          Chief Complaint     Chief Complaint   Patient presents with    Back Pain     bent over and got pain in lower back 1 hr ago         History of Present Illness       This is a 35year old female who states bent over to pick something up from the floor and had an acute sudden onset of left lower back pain  She states she took some tylenol about 1 hour ago with little relief  LMP - IUD     Back Pain        Review of Systems   Review of Systems   Constitutional: Negative  HENT: Negative  Eyes: Negative  Respiratory: Negative  Cardiovascular: Negative  Gastrointestinal: Negative  Endocrine: Negative  Genitourinary: Negative  Musculoskeletal: Positive for back pain  Skin: Negative  Allergic/Immunologic: Negative  Neurological: Negative  Hematological: Negative  Psychiatric/Behavioral: Negative  Current Medications       Current Outpatient Medications:     Aimovig 140 MG/ML SOAJ, Inject 140mg under the skin every 30 (thirty) days  , Disp: 1 pen, Rfl: 10    ALPRAZolam (XANAX) 0 5 mg tablet, Take 1 tablet (0 5 mg total) by mouth daily as needed for anxiety, Disp: 30 tablet, Rfl: 0    ARIPiprazole (ABILIFY) 2 mg tablet, Take 1 tablet (2 mg total) by mouth daily, Disp: 30 tablet, Rfl: 2    B-D 3CC LUER-DEANN SYR 25GX1" 25G X 1" 3 ML MISC, , Disp: , Rfl:     buPROPion (WELLBUTRIN XL) 150 mg 24 hr tablet, Take 1 tablet (150 mg total) by mouth daily, Disp: 30 tablet, Rfl: 2    Cholecalciferol (VITAMIN D3) 49098 units CAPS, Take by mouth once a week, Disp: , Rfl:     escitalopram (LEXAPRO) 10 mg tablet, Take 1 tablet (10 mg total) by mouth daily, Disp: 30 tablet, Rfl: 2    indomethacin (INDOCIN) 25 mg capsule, 1-2 tabs BID PRN migraine WITH FOOD  Hold toradol , Disp: 15 capsule, Rfl: 2    melatonin 1 mg, 1-2 tabs qhs prn insomnia, Disp: 60 tablet, Rfl: 2    onabotulinumtoxin A (BOTOX) 100 units, Inject as directed, Disp: , Rfl:     phentermine (ADIPEX-P) 37 5 MG tablet, Take 1 tablet (37 5 mg total) by mouth daily, Disp: 30 tablet, Rfl: 2    pregabalin (LYRICA) 25 mg capsule, Take 1 capsule (25 mg total) by mouth 2 (two) times a day, Disp: 60 capsule, Rfl: 3    Syringe/Needle, Disp, (SYRINGE 3CC/29SR6-8/4") 27G X 1-1/4" 3 ML MISC, Use for IM injection of ketorolac , Disp: 4 each, Rfl: 0    vitamin B-12 (VITAMIN B-12) 1,000 mcg tablet, Take 1,000 mcg by mouth daily, Disp: , Rfl:     acetaminophen (TYLENOL) 500 mg tablet, Take 2 tablets (1,000 mg total) by mouth every 6 (six) hours as needed for mild pain or moderate pain, Disp: 100 tablet, Rfl: 0    dexamethasone (DECADRON) 2 mg tablet, 1 tab qam with food prn migraine  (Patient not taking: Reported on 4/24/2021), Disp: 5 tablet, Rfl: 0    etonogestrel-ethinyl estradiol (NuvaRing) 0 12-0 015 MG/24HR vaginal ring, Insert ring for 21 days then remove for one week , Disp: 3 each, Rfl: 3    ketorolac (TORADOL) 10 mg tablet, Take 1 tablet (10 mg total) by mouth every 6 (six) hours as needed (migraine) Max 2-3 per week   (Patient not taking: Reported on 4/24/2021), Disp: 10 tablet, Rfl: 0    lidocaine (LIDODERM) 5 %, Apply 1 patch topically daily Remove & Discard patch within 12 hours or as directed by MD, Disp: 6 patch, Rfl: 0    methocarbamol (ROBAXIN) 500 mg tablet, Take 1 tablet (500 mg total) by mouth 3 (three) times a day as needed for muscle spasms, Disp: 30 tablet, Rfl: 0    naproxen (NAPROSYN) 500 mg tablet, Take 1 tablet (500 mg total) by mouth 2 (two) times a day with meals for 7 days, Disp: 14 tablet, Rfl: 0    Current Facility-Administered Medications:     cyanocobalamin injection 1,000 mcg, 1,000 mcg, Intramuscular, Q30 Days, Dunn Brim, DO, 1,000 mcg at 08/03/20 8188    cyanocobalamin injection 1,000 mcg, 1,000 mcg, Intramuscular, Q14 Days, Dunn Brim, DO, 1,000 mcg at 05/18/20 1146    cyanocobalamin injection 1,000 mcg, 1,000 mcg, Intramuscular, Q30 Days, Dunn Brim, DO, 1,000 mcg at 09/01/20 1129    Current Allergies     Allergies as of 04/24/2021 - Reviewed 04/24/2021   Allergen Reaction Noted    Desvenlafaxine  11/26/2012    Valproic acid Other (See Comments) 10/18/2017    Tizanidine Anxiety 01/20/2021            The following portions of the patient's history were reviewed and updated as appropriate: allergies, current medications, past family history, past medical history, past social history, past surgical history and problem list      Past Medical History:   Diagnosis Date    Abnormal Pap smear of cervix     Anxiety     Arthritis     Depression     Fibromyalgia, primary     Migraine     Vitamin B12 deficiency        Past Surgical History:   Procedure Laterality Date    COLONOSCOPY N/A 5/8/2018    Procedure: COLONOSCOPY;  Surgeon: Sakshi Francis MD;  Location: [facility];   Service: Gastroenterology    TONSILLECTOMY      WISDOM TOOTH EXTRACTION         Family History   Problem Relation Age of Onset    Rheum arthritis Mother     Psoriasis Mother     Other Mother     Hypertension Mother     Diabetes unspecified Mother     Sjogren's syndrome Mother     Alcohol abuse Mother     Drug abuse Mother     Anxiety disorder Father    [place] Alcohol abuse Father     Cancer Other     Diabetes Other     Other Other         High blood pressure    Depression Maternal Grandmother          Medications have been verified  Objective   /78   Pulse (!) 115   Temp 97 9 °F (36 6 °C) (Temporal)   Resp 18   Ht 5' 3" (1 6 m)   Wt 99 8 kg (220 lb)   SpO2 99%   BMI 38 97 kg/m²   No LMP recorded  (Menstrual status: Birth Control)  Physical Exam     Physical Exam  Vitals signs and nursing note reviewed  Constitutional:       General: She is not in acute distress  Appearance: Normal appearance  She is obese  She is not ill-appearing, toxic-appearing or diaphoretic  HENT:      Head: Normocephalic and atraumatic  Eyes:      Extraocular Movements: Extraocular movements intact  Neck:      Musculoskeletal: Normal range of motion  Cardiovascular:      Rate and Rhythm: Normal rate  Pulmonary:      Effort: Pulmonary effort is normal    Abdominal:      General: There is no distension  Palpations: Abdomen is soft  Tenderness: There is no abdominal tenderness  Musculoskeletal:         General: Tenderness present  Comments: LROM due to tenderness and pain with movement  Able to ambulate - slow stead gait  Torso movement - slow  B/L LE muscle strength 5/5    Skin:     General: Skin is warm and dry  Neurological:      General: No focal deficit present  Mental Status: She is alert and oriented to person, place, and time  Psychiatric:         Mood and Affect: Mood normal          Behavior: Behavior normal          Thought Content:  Thought content normal          Judgment: Judgment normal

## 2021-04-28 ENCOUNTER — TELEMEDICINE (OUTPATIENT)
Dept: BEHAVIORAL/MENTAL HEALTH CLINIC | Facility: CLINIC | Age: 31
End: 2021-04-28
Payer: COMMERCIAL

## 2021-04-28 DIAGNOSIS — F41.1 GENERALIZED ANXIETY DISORDER: Chronic | ICD-10-CM

## 2021-04-28 DIAGNOSIS — F33.1 DEPRESSION, MAJOR, RECURRENT, MODERATE (HCC): Primary | Chronic | ICD-10-CM

## 2021-04-28 PROCEDURE — 90834 PSYTX W PT 45 MINUTES: CPT | Performed by: SOCIAL WORKER
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
## 2021-05-05 DIAGNOSIS — G43.709 CHRONIC MIGRAINE WITHOUT AURA WITHOUT STATUS MIGRAINOSUS, NOT INTRACTABLE: ICD-10-CM

## 2021-05-05 RX ORDER — KETOROLAC TROMETHAMINE 10 MG/1
10 TABLET, FILM COATED ORAL EVERY 6 HOURS PRN
Qty: 10 TABLET | Refills: 0 | Status: SHIPPED | OUTPATIENT
Start: 2021-05-05 | End: 2021-06-06 | Stop reason: SDUPTHER

## 2021-05-12 ENCOUNTER — CONSULT (OUTPATIENT)
Dept: PLASTIC SURGERY | Facility: CLINIC | Age: 31
End: 2021-05-12
Payer: COMMERCIAL

## 2021-05-12 VITALS
HEIGHT: 63 IN | TEMPERATURE: 97.9 F | WEIGHT: 232 LBS | SYSTOLIC BLOOD PRESSURE: 138 MMHG | DIASTOLIC BLOOD PRESSURE: 92 MMHG | HEART RATE: 128 BPM | BODY MASS INDEX: 41.11 KG/M2

## 2021-05-12 DIAGNOSIS — L72.11 PILAR CYSTS: ICD-10-CM

## 2021-05-12 DIAGNOSIS — D23.9 DYSPLASTIC NEVI: ICD-10-CM

## 2021-05-12 PROCEDURE — 99203 OFFICE O/P NEW LOW 30 MIN: CPT | Performed by: STUDENT IN AN ORGANIZED HEALTH CARE EDUCATION/TRAINING PROGRAM

## 2021-05-12 NOTE — PROGRESS NOTES
Plastic Surgery Consult    Reason for visit: pilar scalp cysts and mole in right inner thigh    HPI:  Patient is a 34 y/o female who presents with pilar scalp cysts x2 and have aga there for years and have been increasing in size  They cause her discomfort, itch and get caught when combing  She also has a mole on her right inner thigh that has been there for as long as she can remember which has been enlarging and gets caught on clothing, irritated, and swells  She is interested in having these lesions removed  They do not drain, no purulence, no cellulitis  ROS: 12 pt ROS negative, except as otherwise noted in HPI  PMH: fibromyalgia, ankylosing spondylitis, anxiety, depression  FamHx: non-contrib  SurgHx: mole removed from umbilicus, wisdom teeth, tonsillectomy  SocHx: no tobacco, rare etoh  Meds: no blood thinners  Allergies: pretiq, depakote    PE:  Vitals:    05/12/21 1357   BP: 138/92   Pulse: (!) 128   Temp: 97 9 °F (36 6 °C)       General: NC/AT, breathing comfortably on RA  Neuro: CN II-XII grossly intact, symmetric reflexes  HEENT: PERRLA, EOMI, external ears normal, no lesions or deformities, neck supple, trachea midline  Respiratory: CTAB, normal respiratory effort  Cardio: RRR, normal S1, S2, no murmur, rubs, gallops  GI: soft, non-tender, non-distended  MSK: normal alignment, mobility, gait      Scalp pilar cysts x2 (2x1 cm anterior scalp, 1x1 cm left lateral scalp), tender with manipulation  Right inner thigh: 0 6x0 6 cm reddish papilloma on the right inner thigh, no drainage, cellulitis, mild discomfort with manipulation      Labs: none    Imaging: none    A/P: 34 y/o female who presents with pilar scalp cysts x2 and right inner thigh melanocytic papilloma  -Risks, benefits, complications, and procedure discussed for excision of all lesions with complex closure  Patient acknowledged  All questions answered and concerns addressed    -informed risk of recurrence, patient acknowldged  -consents obtained  -will obtain photos at procedure date  -Will call with surgery date        Yaw Hastings MD   [facility] Plastic and Reconstructive Surgery   [address]   Office: [phone]

## 2021-05-13 ENCOUNTER — EVALUATION (OUTPATIENT)
Dept: PHYSICAL THERAPY | Age: 31
End: 2021-05-13
Payer: COMMERCIAL

## 2021-05-13 ENCOUNTER — TRANSCRIBE ORDERS (OUTPATIENT)
Dept: PHYSICAL THERAPY | Age: 31
End: 2021-05-13

## 2021-05-13 DIAGNOSIS — M46.90 INFLAMMATORY SPONDYLOPATHY, UNSPECIFIED SPINAL REGION (HCC): Primary | ICD-10-CM

## 2021-05-13 DIAGNOSIS — M79.7 FIBROMYALGIA: ICD-10-CM

## 2021-05-13 DIAGNOSIS — G89.29 CHRONIC LOW BACK PAIN, UNSPECIFIED BACK PAIN LATERALITY, UNSPECIFIED WHETHER SCIATICA PRESENT: ICD-10-CM

## 2021-05-13 DIAGNOSIS — M25.561 CHRONIC PAIN OF RIGHT KNEE: ICD-10-CM

## 2021-05-13 DIAGNOSIS — M54.50 CHRONIC LOW BACK PAIN, UNSPECIFIED BACK PAIN LATERALITY, UNSPECIFIED WHETHER SCIATICA PRESENT: ICD-10-CM

## 2021-05-13 DIAGNOSIS — G89.29 CHRONIC PAIN OF LEFT KNEE: ICD-10-CM

## 2021-05-13 DIAGNOSIS — G89.29 CHRONIC PAIN OF RIGHT KNEE: ICD-10-CM

## 2021-05-13 DIAGNOSIS — M25.562 CHRONIC PAIN OF LEFT KNEE: ICD-10-CM

## 2021-05-13 DIAGNOSIS — G43.709 CHRONIC MIGRAINE WITHOUT AURA WITHOUT STATUS MIGRAINOSUS, NOT INTRACTABLE: ICD-10-CM

## 2021-05-13 DIAGNOSIS — M25.50 ARTHRALGIA OF MULTIPLE SITES: ICD-10-CM

## 2021-05-13 PROCEDURE — 97162 PT EVAL MOD COMPLEX 30 MIN: CPT

## 2021-05-13 NOTE — LETTER
May 13, 2021    Shanell Haddad PA-C  [address] [facility] [address]    Patient: Bartolo Becker   YOB: 1990   Date of Visit: 2021     Encounter Diagnosis     ICD-10-CM    1  Inflammatory spondylopathy, unspecified spinal region ([facility] [address] )  M46 90    2  Arthralgia of multiple sites  M25 50    3  Fibromyalgia  M79 7    4  Chronic low back pain, unspecified back pain laterality, unspecified whether sciatica present  M54 5     G89 29    5  Chronic pain of left knee  M25 562     G89 29    6  Chronic pain of right knee  M25 561     G89 29        Dear Dr Temi Guerra:    Thank you for your recent referral of Bartolo Becker  Please review the attached evaluation summary from Sarah Bethn's recent visit  Please verify that you agree with the plan of care by signing the attached order  If you have any questions or concerns, please do not hesitate to call  I sincerely appreciate the opportunity to share in the care of one of your patients and hope to have another opportunity to work with you in the near future  Sincerely,    Maryana Camejo, PT      Referring Provider:      I certify that I have read the below Plan of Care and certify the need for these services furnished under this plan of treatment while under my care  Shanell Haddad PA-C  [address] [facility] [address]  Via Fax: [phone]          PT Evaluation     Today's date: 2021  Patient name: Bartolo Becker  : 1990  MRN: [id number]  Referring provider: Deion Escobedo PA-C  Dx:   Encounter Diagnosis     ICD-10-CM    1  [facility], [facility]  M46 90    2  Arthralgia of multiple sites  M25 50    3  Fibromyalgia  M79 7    4  Chronic low back pain, unspecified back pain laterality, unspecified whether sciatica present  M54 5     G89 29    5  Chronic pain of left knee  M25 562     G89 29    6   Chronic pain of right knee  M25 561     G89 29 Start Time: 1145  Stop Time: 1235  Total time in clinic (min): 50 minutes    Assessment  Assessment details: Marlowe Paget is a 35 y o  female with Inflammatory spondylopathy multiple joints, fibromyalgia, and chronic fatigue syndrome referred to PT for full body therapy and conditioning  She presents on evaluation with B hip pain rated 4/10 (varies in several joints), impaired strength (L LE weaker than R), impaired flexibility, and impaired endurance  Noted with hip pain during FADIR B and with tenderness over patellar tendons, also with genu valgum and pronounced pronation B possibly impacting multi-site lower extremity pain  Patient is a good candidate for aquatic therapy (to supplement land sessions) to address her multi-factorial symptoms  Her impairments impact her tolerance for prolonged sitting/standing, driving, and walking with distance decreased t 2 blocks compared to 1 mile prior  She would benefit from skilled PT intervention to address above impairments and promote return to prior functional level  Impairments: abnormal or restricted ROM, activity intolerance, impaired balance, impaired physical strength, lacks appropriate home exercise program, pain with function, poor posture  and poor body mechanics  Understanding of Dx/Px/POC: good   Prognosis: good    Goals  STG: in 4 visits  1) Patient will decrease B hip pain to less than 2/10 to promote ease with mobility  2) Patient will improve score on 5x STS to <15 seconds to promote functional B LE strength and ease with functional activity  3) Patient will increase ambulation tolerance to 5 blocks with 1 rest break to increase community ambulation  LTG: in 8 visits  1) Patient will decrease B hip pain to less than 4/10 at worst to promote tolerance for mobility  2) Patient will increase gross B LE strength by 1/2 grade to promote ease with ambulation and stairs     3) Patient will increase sitting tolerance to at least 30 minutes before requiring positional change to promote ease with driving  Plan  Plan details: RE in 4 weeks  Patient would benefit from: skilled physical therapy  Referral necessary: No  Planned modality interventions: cryotherapy, TENS and thermotherapy: hydrocollator packs  Planned therapy interventions: activity modification, aquatic therapy, body mechanics training, coordination, flexibility, functional ROM exercises, gait training, graded exercise, home exercise program, joint mobilization, manual therapy, massage, neuromuscular re-education, patient education, strengthening, stretching, therapeutic activities, therapeutic exercise, therapeutic training, balance and postural training  Frequency: 2x week  Duration in visits: 8  Duration in weeks: 4  Treatment plan discussed with: patient        Subjective Evaluation    History of Present Illness  Mechanism of injury: Patient reports having fibromyalgia and chronic fatigue for the past 3 years and more recently diagnosed with ankylosing spondylosis  She has a lot of pain and feels muscle weakness, more on left side, sometimes in her back, hips, knees, and shins  It has been getting worse and she cannot work due to feeling too tired with prolonged sitting or standing-tolerance about 15 minutes  She sleeps 'more than I should'- about 7 hours during the day  She used to be able to walk 1 mile (last did this in September) but is now limited to a few blocks due to shin pain, and can do 1-2 flights of stairs  She can drive for about 20 minutes before pain sets in  She saw SIMRAN Colunga on  who referred her to PT for the pool and land  She did aqua perla in  which she thinks helped so wants to try the pool  Sometimes uses ice or heat, in addition to medications for pain  Pain  Current pain ratin (B hips)  At best pain ratin (B hips)  At worst pain ratin (B hips)  Pain location: B hips today, sometimes varies and feels in knees, back    Quality: squeezing and tight  Relieving factors: ice, heat, change in position, medications and rest  Aggravating factors: standing, sitting, walking and stair climbing  Progression: worsening    Social Support  Steps to enter house: yes (1 FOS)  Stairs in house: no   Lives in: multiple-level home  Lives with: parents    Employment status: not working  Exercise history: Did exercise classes before 3 years ago  Aqua perla 2019  Walks 1 mile, but not since September      Diagnostic Tests    FCE comments: No recent imaging of back, hips or kneesTreatments  Previous treatment: medication  Patient Goals  Patient goals for therapy: decreased pain, increased strength and return to sport/leisure activities  Patient goal: Return to walking        Objective     Observations     Additional Observation Details  Genu valgum B, feet pronated    Tenderness   Left Knee   Tenderness in the patellar tendon  No tenderness in the quadriceps tendon  Right Knee   Tenderness in the patellar tendon  No tenderness in the quadriceps tendon  Active Range of Motion     Lumbar   Flexion:  WFL  Extension:  Restriction level: minimal  Left lateral flexion:  WFL  Right lateral flexion:  WFL    Passive Range of Motion   Left Hip   Flexion: 90 degrees   External rotation (prone): with pain    Right Hip   Flexion: 90 degrees   External rotation (prone): with pain    Strength/Myotome Testing     Left Hip   Planes of Motion   Flexion: 4-  External rotation: 4-  Internal rotation: 4-    Right Hip   Planes of Motion   Flexion: 4  External rotation: 4  Internal rotation: 4    Left Knee   Flexion: 4-  Extension: 4-    Right Knee   Flexion: 4  Extension: 4    Left Ankle/Foot   Dorsiflexion: 4+    Right Ankle/Foot   Dorsiflexion: 4+    Tests     Lumbar     Left   Negative passive SLR  Right   Negative passive SLR  Left Hip   Positive FADIR  Negative DWIGHT  Right Hip   Positive FADIR  Negative DWIGHT       Ambulation     Ambulation: Stairs   Ascend stairs: independent  Pattern: reciprocal  Railings: without rails  Descend stairs: independent  Pattern: reciprocal  Railings: without rails    Observational Gait   Decreased walking speed     Left arm swing: decreased    Additional Observational Gait Details  Non-antalgic    Functional Assessment        Single Leg Stance   Left: 19 seconds  Right: 30 seconds    Comments  5x STS- 18 seconds (knee discomfort patellar tendon)             Precautions: fibromyalgia, chronic fatigue         Manuals 5/13       Measurements IE                               Neuro Re-Ed         SLS        Tandem on foam                                                Ther Ex        Gastroc stretch standing 2x20" B       Seated ant tib stretch 2x10"       Clam                                                Ther Activity        Sit <>stand 5x       Step up/down        Gait Training                        Modalities

## 2021-05-13 NOTE — PROGRESS NOTES
PT Evaluation     Today's date: 2021  Patient name: Jose Chacon  : 1990  MRN: [id number]  Referring provider: Xiang Rebollar PA-C  Dx:   Encounter Diagnosis     ICD-10-CM    1  Inflammatory spondylopathy, unspecified spinal region ([facility] [address] )  M46 90    2  Arthralgia of multiple sites  M25 50    3  Fibromyalgia  M79 7    4  Chronic low back pain, unspecified back pain laterality, unspecified whether sciatica present  M54 5     G89 29    5  Chronic pain of left knee  M25 562     G89 29    6  Chronic pain of right knee  M25 561     G89 29        Start Time: 1145  Stop Time: 1235  Total time in clinic (min): 50 minutes    Assessment  Assessment details: Jose Chacon is a 35 y o  female with Inflammatory spondylopathy multiple joints, fibromyalgia, and chronic fatigue syndrome referred to PT for full body therapy and conditioning  She presents on evaluation with B hip pain rated 4/10 (varies in several joints), impaired strength (L LE weaker than R), impaired flexibility, and impaired endurance  Noted with hip pain during FADIR B and with tenderness over patellar tendons, also with genu valgum and pronounced pronation B possibly impacting multi-site lower extremity pain  Patient is a good candidate for aquatic therapy (to supplement land sessions) to address her multi-factorial symptoms  Her impairments impact her tolerance for prolonged sitting/standing, driving, and walking with distance decreased t 2 blocks compared to 1 mile prior  She would benefit from skilled PT intervention to address above impairments and promote return to prior functional level    Impairments: abnormal or restricted ROM, activity intolerance, impaired balance, impaired physical strength, lacks appropriate home exercise program, pain with function, poor posture  and poor body mechanics  Understanding of Dx/Px/POC: good   Prognosis: good    Goals  STG: in 4 visits  1) Patient will decrease B hip pain to less than 2/10 to promote ease with mobility  2) Patient will improve score on 5x STS to <15 seconds to promote functional B LE strength and ease with functional activity  3) Patient will increase ambulation tolerance to 5 blocks with 1 rest break to increase community ambulation  LTG: in 8 visits  1) Patient will decrease B hip pain to less than 4/10 at worst to promote tolerance for mobility  2) Patient will increase gross B LE strength by 1/2 grade to promote ease with ambulation and stairs  3) Patient will increase sitting tolerance to at least 30 minutes before requiring positional change to promote ease with driving  Plan  Plan details: RE in 4 weeks  Patient would benefit from: skilled physical therapy  Referral necessary: No  Planned modality interventions: cryotherapy, TENS and thermotherapy: hydrocollator packs  Planned therapy interventions: activity modification, aquatic therapy, body mechanics training, coordination, flexibility, functional ROM exercises, gait training, graded exercise, home exercise program, joint mobilization, manual therapy, massage, neuromuscular re-education, patient education, strengthening, stretching, therapeutic activities, therapeutic exercise, therapeutic training, balance and postural training  Frequency: 2x week  Duration in visits: 8  Duration in weeks: 4  Treatment plan discussed with: patient        Subjective Evaluation    History of Present Illness  Mechanism of injury: Patient reports having fibromyalgia and chronic fatigue for the past 3 years and more recently diagnosed with ankylosing spondylosis  She has a lot of pain and feels muscle weakness, more on left side, sometimes in her back, hips, knees, and shins  It has been getting worse and she cannot work due to feeling too tired with prolonged sitting or standing-tolerance about 15 minutes  She sleeps 'more than I should'- about 7 hours during the day   She used to be able to walk 1 mile (last did this in September) but is now limited to a few blocks due to shin pain, and can do 1-2 flights of stairs  She can drive for about 20 minutes before pain sets in  She saw PA Paticia Canavan on  who referred her to PT for the pool and land  She did aqua perla in  which she thinks helped so wants to try the pool  Sometimes uses ice or heat, in addition to medications for pain  Pain  Current pain ratin (B hips)  At best pain ratin (B hips)  At worst pain ratin (B hips)  Pain location: B hips today, sometimes varies and feels in knees, back  Quality: squeezing and tight  Relieving factors: ice, heat, change in position, medications and rest  Aggravating factors: standing, sitting, walking and stair climbing  Progression: worsening    Social Support  Steps to enter house: yes (1 FOS)  Stairs in house: no   Lives in: multiple-level home  Lives with: parents    Employment status: not working  Exercise history: Did exercise classes before 3 years ago  Aqua perla 2019  Walks 1 mile, but not since September      Diagnostic Tests    FCE comments: No recent imaging of back, hips or kneesTreatments  Previous treatment: medication  Patient Goals  Patient goals for therapy: decreased pain, increased strength and return to sport/leisure activities  Patient goal: Return to walking        Objective     Observations     Additional Observation Details  Genu valgum B, feet pronated    Tenderness   Left Knee   Tenderness in the patellar tendon  No tenderness in the quadriceps tendon  Right Knee   Tenderness in the patellar tendon  No tenderness in the quadriceps tendon       Active Range of Motion     Lumbar   Flexion:  WFL  Extension:  Restriction level: minimal  Left lateral flexion:  WFL  Right lateral flexion:  WFL    Passive Range of Motion   Left Hip   Flexion: 90 degrees   External rotation (prone): with pain    Right Hip   Flexion: 90 degrees   External rotation (prone): with pain    Strength/Myotome Testing     Left Hip   Planes of Motion Flexion: 4-  External rotation: 4-  Internal rotation: 4-    Right Hip   Planes of Motion   Flexion: 4  External rotation: 4  Internal rotation: 4    Left Knee   Flexion: 4-  Extension: 4-    Right Knee   Flexion: 4  Extension: 4    Left Ankle/Foot   Dorsiflexion: 4+    Right Ankle/Foot   Dorsiflexion: 4+    Tests     Lumbar     Left   Negative passive SLR  Right   Negative passive SLR  Left Hip   Positive FADIR  Negative DWIGHT  Right Hip   Positive FADIR  Negative DWIGHT  Ambulation     Ambulation: Stairs   Ascend stairs: independent  Pattern: reciprocal  Railings: without rails  Descend stairs: independent  Pattern: reciprocal  Railings: without rails    Observational Gait   Decreased walking speed     Left arm swing: decreased    Additional Observational Gait Details  Non-antalgic    Functional Assessment        Single Leg Stance   Left: 19 seconds  Right: 30 seconds    Comments  5x STS- 18 seconds (knee discomfort patellar tendon)             Precautions: fibromyalgia, chronic fatigue         Manuals 5/13       Measurements IE                               Neuro Re-Ed         SLS        Tandem on foam                                                Ther Ex        Gastroc stretch standing 2x20" B       Seated ant tib stretch 2x10"       Clam                                                Ther Activity        Sit <>stand 5x       Step up/down        Gait Training                        Modalities

## 2021-05-14 RX ORDER — INDOMETHACIN 25 MG/1
CAPSULE ORAL
Qty: 15 CAPSULE | Refills: 2 | Status: SHIPPED | OUTPATIENT
Start: 2021-05-14 | End: 2021-05-26

## 2021-05-16 ENCOUNTER — HOSPITAL ENCOUNTER (EMERGENCY)
Facility: HOSPITAL | Age: 31
Discharge: HOME | End: 2021-05-16
Attending: EMERGENCY MEDICINE
Payer: COMMERCIAL

## 2021-05-16 VITALS
TEMPERATURE: 98.4 F | BODY MASS INDEX: 41.11 KG/M2 | RESPIRATION RATE: 16 BRPM | SYSTOLIC BLOOD PRESSURE: 114 MMHG | WEIGHT: 232 LBS | OXYGEN SATURATION: 99 % | HEIGHT: 63 IN | HEART RATE: 84 BPM | DIASTOLIC BLOOD PRESSURE: 68 MMHG

## 2021-05-16 DIAGNOSIS — G43.909 MIGRAINE: Primary | ICD-10-CM

## 2021-05-16 PROCEDURE — 96365 THER/PROPH/DIAG IV INF INIT: CPT

## 2021-05-16 PROCEDURE — 99284 EMERGENCY DEPT VISIT MOD MDM: CPT | Performed by: PHYSICIAN ASSISTANT

## 2021-05-16 PROCEDURE — 96361 HYDRATE IV INFUSION ADD-ON: CPT

## 2021-05-16 PROCEDURE — 99283 EMERGENCY DEPT VISIT LOW MDM: CPT

## 2021-05-16 PROCEDURE — 96375 TX/PRO/DX INJ NEW DRUG ADDON: CPT

## 2021-05-16 RX ORDER — METOCLOPRAMIDE 10 MG/1
10 TABLET ORAL 3 TIMES DAILY PRN
Qty: 30 TABLET | Refills: 0 | Status: SHIPPED | OUTPATIENT
Start: 2021-05-16 | End: 2021-09-03

## 2021-05-16 RX ORDER — METOCLOPRAMIDE HYDROCHLORIDE 5 MG/ML
10 INJECTION INTRAMUSCULAR; INTRAVENOUS ONCE
Status: COMPLETED | OUTPATIENT
Start: 2021-05-16 | End: 2021-05-16

## 2021-05-16 RX ORDER — KETOROLAC TROMETHAMINE 30 MG/ML
30 INJECTION, SOLUTION INTRAMUSCULAR; INTRAVENOUS ONCE
Status: COMPLETED | OUTPATIENT
Start: 2021-05-16 | End: 2021-05-16

## 2021-05-16 RX ORDER — MAGNESIUM SULFATE HEPTAHYDRATE 40 MG/ML
2 INJECTION, SOLUTION INTRAVENOUS ONCE
Status: COMPLETED | OUTPATIENT
Start: 2021-05-16 | End: 2021-05-16

## 2021-05-16 RX ORDER — METOCLOPRAMIDE 5 MG/1
10 TABLET ORAL 3 TIMES DAILY PRN
Status: DISCONTINUED | OUTPATIENT
Start: 2021-05-16 | End: 2021-05-16

## 2021-05-16 RX ORDER — DIPHENHYDRAMINE HYDROCHLORIDE 50 MG/ML
25 INJECTION INTRAMUSCULAR; INTRAVENOUS ONCE
Status: COMPLETED | OUTPATIENT
Start: 2021-05-16 | End: 2021-05-16

## 2021-05-16 RX ADMIN — KETOROLAC TROMETHAMINE 30 MG: 30 INJECTION, SOLUTION INTRAMUSCULAR; INTRAVENOUS at 10:42

## 2021-05-16 RX ADMIN — DIPHENHYDRAMINE HYDROCHLORIDE 25 MG: 50 INJECTION INTRAMUSCULAR; INTRAVENOUS at 10:36

## 2021-05-16 RX ADMIN — MAGNESIUM SULFATE HEPTAHYDRATE 2 G: 40 INJECTION, SOLUTION INTRAVENOUS at 10:43

## 2021-05-16 RX ADMIN — SODIUM CHLORIDE 1000 ML: 0.9 INJECTION, SOLUTION INTRAVENOUS at 10:31

## 2021-05-16 RX ADMIN — METOCLOPRAMIDE 10 MG: 5 INJECTION, SOLUTION INTRAMUSCULAR; INTRAVENOUS at 10:40

## 2021-05-16 NOTE — DISCHARGE INSTRUCTIONS
If migraine returns, tylenol, NSAIDs(such as ibuprofen, ketorolac or naproxen), benadryl and reglan may be helpful to decrease migraine symptoms  Supplementing with magnesium daily may also help prevent migraines  Follow-up with your family doctor if symptoms continue and return to ER if you develop any fevers or symptoms significantly change or worsen

## 2021-05-16 NOTE — ED PROVIDER NOTES
History  Chief Complaint   Patient presents with    Migraine     According to the patient, she has had a hx of migraines and had the migraine since Thursday afternoon     50-year-old female history of fibromyalgia and migraines presents complaining of migraine  Patient reports that it started Thursday afternoon  Came on gradually, has been constant without relief from Toradol, Tylenol and Benadryl  She denies any visual changes, paresthesias or motor weakness  She denies any thunderclap sensation or neck pain or neck stiffness  States this feels like her typical migraine that has resolved from migraine cocktail in the past   Denies any other complaints this time  Prior to Admission Medications   Prescriptions Last Dose Informant Patient Reported? Taking? ALPRAZolam (XANAX) 0 5 mg tablet   No No   Sig: Take 1 tablet (0 5 mg total) by mouth daily as needed for anxiety   ARIPiprazole (ABILIFY) 2 mg tablet   No No   Sig: Take 1 tablet (2 mg total) by mouth daily   Aimovig 140 MG/ML SOAJ   No No   Sig: Inject 140mg under the skin every 30 (thirty) days  B-D 3CC LUER-DEANN SYR 25GX1" 25G X 1" 3 ML MISC  Self Yes No   Cholecalciferol (VITAMIN D3) 54642 units CAPS  Self Yes No   Sig: Take by mouth once a week   Syringe/Needle, Disp, (SYRINGE 3CC/86WN6-4/4") 27G X 1-1/4" 3 ML MISC  Self No No   Sig: Use for IM injection of ketorolac    acetaminophen (TYLENOL) 500 mg tablet   No No   Sig: Take 2 tablets (1,000 mg total) by mouth every 6 (six) hours as needed for mild pain or moderate pain   buPROPion (WELLBUTRIN XL) 150 mg 24 hr tablet   No No   Sig: Take 1 tablet (150 mg total) by mouth daily   dexamethasone (DECADRON) 2 mg tablet   No No   Si tab qam with food prn migraine     Patient not taking: Reported on 2021   escitalopram (LEXAPRO) 10 mg tablet   No No   Sig: Take 1 tablet (10 mg total) by mouth daily   etonogestrel-ethinyl estradiol (NuvaRing) 0 12-0 015 MG/24HR vaginal ring  Self No No Sig: Insert ring for 21 days then remove for one week  indomethacin (INDOCIN) 25 mg capsule   No No   Si-2 tabs BID PRN migraine WITH FOOD  Hold toradol    ketorolac (TORADOL) 10 mg tablet   No No   Sig: Take 1 tablet (10 mg total) by mouth every 6 (six) hours as needed (migraine) Max 2-3 per week    lidocaine (LIDODERM) 5 %   No No   Sig: Apply 1 patch topically daily Remove & Discard patch within 12 hours or as directed by MD   melatonin 1 mg  Self No No   Si-2 tabs qhs prn insomnia   methocarbamol (ROBAXIN) 500 mg tablet   No No   Sig: Take 1 tablet (500 mg total) by mouth 3 (three) times a day as needed for muscle spasms   naproxen (NAPROSYN) 500 mg tablet   No No   Sig: Take 1 tablet (500 mg total) by mouth 2 (two) times a day with meals for 7 days   onabotulinumtoxin A (BOTOX) 100 units  Self Yes No   Sig: Inject as directed   phentermine (ADIPEX-P) 37 5 MG tablet   No No   Sig: Take 1 tablet (37 5 mg total) by mouth daily   pregabalin (LYRICA) 25 mg capsule   No No   Sig: Take 1 capsule (25 mg total) by mouth 2 (two) times a day   vitamin B-12 (VITAMIN B-12) 1,000 mcg tablet  Self Yes No   Sig: Take 1,000 mcg by mouth daily      Facility-Administered Medications Last Administration Doses Remaining   cyanocobalamin injection 1,000 mcg 8/3/2020  8:59 AM    cyanocobalamin injection 1,000 mcg 2020 11:46 AM    cyanocobalamin injection 1,000 mcg 2020 11:29 AM           Past Medical History:   Diagnosis Date    Abnormal Pap smear of cervix     Anxiety     Arthritis     Depression     Fibromyalgia, primary     Migraine     Vitamin B12 deficiency        Past Surgical History:   Procedure Laterality Date    COLONOSCOPY N/A 2018    Procedure: COLONOSCOPY;  Surgeon: Gaston Cooper MD;  Location: [facility];   Service: Gastroenterology    TONSILLECTOMY      WISDOM TOOTH EXTRACTION         Family History   Problem Relation Age of Onset    Rheum arthritis Mother     Psoriasis Mother  Other Mother     Hypertension Mother     Diabetes unspecified Mother     Sjogren's syndrome Mother     Alcohol abuse Mother     Drug abuse Mother     Anxiety disorder Father     Alcohol abuse Father     Cancer Other     Diabetes Other     Other Other         High blood pressure    Depression Maternal Grandmother      I have reviewed and agree with the history as documented  E-Cigarette/Vaping    E-Cigarette Use Current Some Day User     Start Date 7/1/19     Comments medical marijuana       E-Cigarette/Vaping Substances    Nicotine No     THC Yes     CBD No     Flavoring No     Other No     Unknown No      Social History     Tobacco Use    Smoking status: Never Smoker    Smokeless tobacco: Never Used   Substance Use Topics    Alcohol use: No    Drug use: Yes     Types: Marijuana     Comment: Medical marijuana       Review of Systems   Constitutional: Negative for chills, fatigue and fever  HENT: Negative for ear pain and sore throat  Eyes: Negative for pain  Respiratory: Negative for cough, shortness of breath and wheezing  Cardiovascular: Negative for chest pain, palpitations and leg swelling  Gastrointestinal: Negative for abdominal pain, constipation, diarrhea, nausea and vomiting  Endocrine: Negative for polyuria  Genitourinary: Negative for dysuria and pelvic pain  Musculoskeletal: Negative for arthralgias, myalgias, neck pain and neck stiffness  Skin: Negative for rash  Neurological: Positive for headaches  Negative for dizziness, syncope and light-headedness  All other systems reviewed and are negative  Physical Exam  Physical Exam  Constitutional:       Appearance: She is well-developed  HENT:      Head: Normocephalic and atraumatic  Mouth/Throat:      Pharynx: No oropharyngeal exudate  Neck:      Musculoskeletal: Normal range of motion  Cardiovascular:      Rate and Rhythm: Normal rate and regular rhythm        Heart sounds: Normal heart sounds  Pulmonary:      Effort: Pulmonary effort is normal       Breath sounds: Normal breath sounds  Abdominal:      General: Bowel sounds are normal       Palpations: Abdomen is soft  Tenderness: There is no abdominal tenderness  Musculoskeletal: Normal range of motion  Skin:     General: Skin is warm  Capillary Refill: Capillary refill takes less than 2 seconds  Neurological:      General: No focal deficit present  Mental Status: She is alert and oriented to person, place, and time  Cranial Nerves: No cranial nerve deficit  Sensory: No sensory deficit  Vital Signs  ED Triage Vitals [05/16/21 0901]   Temperature Pulse Respirations Blood Pressure SpO2   98 4 °F (36 9 °C) 100 18 152/92 98 %      Temp Source Heart Rate Source Patient Position - Orthostatic VS BP Location FiO2 (%)   Tympanic Monitor Lying Right arm --      Pain Score       7           Vitals:    05/16/21 0901 05/16/21 1152   BP: 152/92 114/68   Pulse: 100 84   Patient Position - Orthostatic VS: Lying Lying         Visual Acuity      ED Medications  Medications   diphenhydrAMINE (BENADRYL) injection 25 mg (25 mg Intravenous Given 5/16/21 1036)   metoclopramide (REGLAN) injection 10 mg (10 mg Intravenous Given 5/16/21 1040)   ketorolac (TORADOL) injection 30 mg (30 mg Intravenous Given 5/16/21 1042)   magnesium sulfate 2 g/50 mL IVPB (premix) 2 g (0 g Intravenous Stopped 5/16/21 1115)   sodium chloride 0 9 % bolus 1,000 mL (0 mL Intravenous Stopped 5/16/21 1152)       Diagnostic Studies  Results Reviewed     None                 No orders to display              Procedures  Procedures         ED Course  ED Course as of May 16 1416   Sun May 16, 2021   1120 Reassessed patient  She is feeling much better  Requesting discharge home  MDM  Number of Diagnoses or Management Options  Migraine:   Diagnosis management comments: HA was gradual onset  No f/c/s   No neck stiffness  No focal neurological symptoms  No temporal artery pain/tenderness  No vision changes  Headaches are not increasing in severity or frequency  Not worse in the AM  No head trauma  Doubt IIH  States it feels like typical migraine  Patient is well appearing and neurologically intact  Headache was not acute or maximal in onset  Do not suspect SAH, temporal arteritis, meningitis, encephalitis, CO poisoning, acute angle closure glaucoma, dural venous sinus thrombosis as cause of headache  Do not feel that further imaging or workup (including LP) are warranted at this time  Disposition  Final diagnoses:   Migraine     Time reflects when diagnosis was documented in both MDM as applicable and the Disposition within this note     Time User Action Codes Description Comment    5/16/2021 11:25 AM Brian Holly Add [F98 [id number]] Migraine       ED Disposition     ED Disposition Condition Date/Time Comment    Discharge Stable Sun May 16, 2021 11:25 AM Cody Ziegler discharge to home/self care  Follow-up Information     Follow up With Specialties Details Why Contact [facility], DO Family Medicine  As needed [place] [address]  [phone]            Discharge Medication List as of 5/16/2021 12:05 PM      START taking these medications    Details   metoclopramide (REGLAN) 10 mg tablet Take 1 tablet (10 mg total) by mouth 3 (three) times a day as needed (headache or nausea), Starting Sun 5/16/2021, Normal         CONTINUE these medications which have NOT CHANGED    Details   acetaminophen (TYLENOL) 500 mg tablet Take 2 tablets (1,000 mg total) by mouth every 6 (six) hours as needed for mild pain or moderate pain, Starting Sat 4/24/2021, Normal      Aimovig 140 MG/ML SOAJ Inject 140mg under the skin every 30 (thirty) days  , Normal      ALPRAZolam (XANAX) 0 5 mg tablet Take 1 tablet (0 5 mg total) by mouth daily as needed for anxiety, Starting Thu 2/25/2021, Normal ARIPiprazole (ABILIFY) 2 mg tablet Take 1 tablet (2 mg total) by mouth daily, Starting Mon 4/5/2021, Normal      !! B-D 3CC LUER-DEANN SYR 25GX1" 25G X 1" 3 ML MISC Starting Thu 7/26/2018, Historical Med      buPROPion (WELLBUTRIN XL) 150 mg 24 hr tablet Take 1 tablet (150 mg total) by mouth daily, Starting Tue 4/13/2021, Normal      Cholecalciferol (VITAMIN D3) 64708 units CAPS Take by mouth once a week, Historical Med      dexamethasone (DECADRON) 2 mg tablet 1 tab qam with food prn migraine , Normal      escitalopram (LEXAPRO) 10 mg tablet Take 1 tablet (10 mg total) by mouth daily, Starting Fri 1/8/2021, Normal      etonogestrel-ethinyl estradiol (NuvaRing) 0 12-0 015 MG/24HR vaginal ring Insert ring for 21 days then remove for one week , Normal      indomethacin (INDOCIN) 25 mg capsule 1-2 tabs BID PRN migraine WITH FOOD  Hold toradol , Normal      ketorolac (TORADOL) 10 mg tablet Take 1 tablet (10 mg total) by mouth every 6 (six) hours as needed (migraine) Max 2-3 per week , Starting Wed 5/5/2021, Normal      lidocaine (LIDODERM) 5 % Apply 1 patch topically daily Remove & Discard patch within 12 hours or as directed by MD, Starting Sat 4/24/2021, Normal      melatonin 1 mg 1-2 tabs qhs prn insomnia, Normal      methocarbamol (ROBAXIN) 500 mg tablet Take 1 tablet (500 mg total) by mouth 3 (three) times a day as needed for muscle spasms, Starting Sat 4/24/2021, Normal      naproxen (NAPROSYN) 500 mg tablet Take 1 tablet (500 mg total) by mouth 2 (two) times a day with meals for 7 days, Starting Wed 3/31/2021, Until Wed 4/7/2021, Normal      onabotulinumtoxin A (BOTOX) 100 units Inject as directed, Starting Tue 8/29/2017, Historical Med      phentermine (ADIPEX-P) 37 5 MG tablet Take 1 tablet (37 5 mg total) by mouth daily, Starting Wed 3/17/2021, Normal      pregabalin (LYRICA) 25 mg capsule Take 1 capsule (25 mg total) by mouth 2 (two) times a day, Starting Thu 2/4/2021, Normal      !!  Syringe/Needle, Disp, (SYRINGE 3CC/92FJ8-5/4") 27G X 1-1/4" 3 ML MISC Use for IM injection of ketorolac , Normal      vitamin B-12 (VITAMIN B-12) 1,000 mcg tablet Take 1,000 mcg by mouth daily, Historical Med       !! - Potential duplicate medications found  Please discuss with provider  No discharge procedures on file      PDMP Review       Value Time User    PDMP Reviewed  Yes 3/17/2021 11:20 AM Te Burris DO          ED Provider  Electronically Signed by           Albert Bello PA-C  05/16/21 0790

## 2021-05-17 ENCOUNTER — TELEMEDICINE (OUTPATIENT)
Dept: BEHAVIORAL/MENTAL HEALTH CLINIC | Facility: CLINIC | Age: 31
End: 2021-05-17
Payer: COMMERCIAL

## 2021-05-17 DIAGNOSIS — F41.1 GENERALIZED ANXIETY DISORDER: Chronic | ICD-10-CM

## 2021-05-17 DIAGNOSIS — F33.1 DEPRESSION, MAJOR, RECURRENT, MODERATE (HCC): Primary | Chronic | ICD-10-CM

## 2021-05-17 PROCEDURE — 90834 PSYTX W PT 45 MINUTES: CPT | Performed by: SOCIAL WORKER

## 2021-05-17 NOTE — PSYCH
Virtual Regular Visit    This note was not shared with the patient due to this is a psychotherapy note    Assessment/Plan:    Problem List Items Addressed This Visit        Other    Generalized anxiety disorder (Chronic)    Depression, major, recurrent, moderate (HCC) - Primary (Chronic)        Goals addressed in session: Goal 1        Reason for visit is Behavioral Health session, conducted through video, due to COVID-19 precautions  Charis Hicks has verbalized a preference to continue with virtual sessions at this time  Charis Hicks has been offered in-person sessions and has declined  Encounter provider APARNA Darby    Provider located at [address] [place] [id number] [phone]      Recent Visits  No visits were found meeting these conditions  Showing recent visits within past 7 days and meeting all other requirements     Future Appointments  No visits were found meeting these conditions  Showing future appointments within next 150 days and meeting all other requirements      The patient was identified by name and date of birth  Gio Kinney was informed that this is a telemedicine visit and that the visit is being conducted through [address] [facility] and patient was informed that this is a secure, HIPAA-compliant platform  She agrees to proceed  My office door was closed  No one else was in the room  She acknowledged consent and understanding of privacy and security of the video platform  The patient has agreed to participate and understands they can discontinue the visit at any time  Patient is aware this is a billable service  Alison Price is a 35 y o  female  DATA: Met with Stephanie for scheduled individual session  Topics of discussion included relationships with family, physical health concerns and relationship issues; nutrition and healthy eating  Gabriela Sandy discussed her medical issues  She states that she had to go to the emergency room yesterday for an intractable migraine  She has a surgery date to remove a cyst from her head (July 1st)  She also discussed starting physical therapy  She will be having aqua-therapy and regular PT  Wing Espinal discussed her attempts to gain healthy eating habits  She states that she needs some concrete tips for increasing healthy food choices  We used solution-focused techniques to address her eating habits  Wing Espinal discussed her relationship with her boyfriend  She states that he did get his first covid vaccine and is scheduled for his next one this week  Wing Espinal states that she and her boyfriend are getting along well, and they are working on making plans to go to [place] for her birthday  Wing Espinal discussed trust issues with her boyfriend  She states that she "had a feeling that something was wrong " She states that she talked to him about it "rather than checking his phone " Client shows evidence of utilizing Mindfulness-based strategies skills to manage mental health symptoms  During this session, this clinician used the following therapeutic modalities: supportive psychotherapy, client-centered therapy, mindfulness-based strategies, DBT-informed skills, Motivational Interviewing and solution-focused therapy  Clinician provided psychoeducation regarding use of healthy eating and mindful eating tips    ASSESSMENT: Wing Espinal presents with a euthymic mood  Her affect is normal range and intensity, appropriate  Wing Espinal exhibits strong therapeutic rapport with this clinician  Wing Espinal continues to exhibit willingness to work on treatment goals and objectives  Wing Espinal presents with a minimal risk of suicide, minimal risk of self-harm, and minimal risk of harm to others  PLAN: Wing Espinal will return in two weeks for the next scheduled session   Between sessions, Wing Espinal will continue to practice mindfulness-based strategies and will report back during the next session re: successes and barriers  At the next session, this clinician will use supportive psychotherapy, client-centered therapy, mindfulness-based strategies, DBT-informed skills, Motivational Interviewing and solution-focused therapy to address her mood regulation and relationship concerns, in an effort to assist Alvin Marquis with meeting treatment goals  HPI     Past Medical History:   Diagnosis Date    Abnormal Pap smear of cervix     Anxiety     Arthritis     Depression     Fibromyalgia, primary     Migraine     Vitamin B12 deficiency        Past Surgical History:   Procedure Laterality Date    COLONOSCOPY N/A 5/8/2018    Procedure: COLONOSCOPY;  Surgeon: Sakshi Francis MD;  [facility]; Service: Gastroenterology    TONSILLECTOMY      WISDOM TOOTH EXTRACTION         Current Outpatient Medications   Medication Sig Dispense Refill    acetaminophen (TYLENOL) 500 mg tablet Take 2 tablets (1,000 mg total) by mouth every 6 (six) hours as needed for mild pain or moderate pain 100 tablet 0    Aimovig 140 MG/ML SOAJ Inject 140mg under the skin every 30 (thirty) days  1 pen 10    ALPRAZolam (XANAX) 0 5 mg tablet Take 1 tablet (0 5 mg total) by mouth daily as needed for anxiety 30 tablet 0    ARIPiprazole (ABILIFY) 2 mg tablet Take 1 tablet (2 mg total) by mouth daily 30 tablet 2    B-D 3CC LUER-DEANN SYR 25GX1" 25G X 1" 3 ML MISC       buPROPion (WELLBUTRIN XL) 150 mg 24 hr tablet Take 1 tablet (150 mg total) by mouth daily 30 tablet 2    Cholecalciferol (VITAMIN D3) 86919 units CAPS Take by mouth once a week      dexamethasone (DECADRON) 2 mg tablet 1 tab qam with food prn migraine  (Patient not taking: Reported on 4/24/2021) 5 tablet 0    escitalopram (LEXAPRO) 10 mg tablet Take 1 tablet (10 mg total) by mouth daily 30 tablet 2    etonogestrel-ethinyl estradiol (NuvaRing) 0 12-0 015 MG/24HR vaginal ring Insert ring for 21 days then remove for one week   3 each 3    indomethacin (INDOCIN) 25 mg capsule 1-2 tabs BID PRN migraine WITH FOOD  Hold toradol  15 capsule 2    ketorolac (TORADOL) 10 mg tablet Take 1 tablet (10 mg total) by mouth every 6 (six) hours as needed (migraine) Max 2-3 per week   10 tablet 0    lidocaine (LIDODERM) 5 % Apply 1 patch topically daily Remove & Discard patch within 12 hours or as directed by MD Manley patch 0    melatonin 1 mg 1-2 tabs qhs prn insomnia 60 tablet 2    methocarbamol (ROBAXIN) 500 mg tablet Take 1 tablet (500 mg total) by mouth 3 (three) times a day as needed for muscle spasms 30 tablet 0    metoclopramide (REGLAN) 10 mg tablet Take 1 tablet (10 mg total) by mouth 3 (three) times a day as needed (headache or nausea) 30 tablet 0    naproxen (NAPROSYN) 500 mg tablet Take 1 tablet (500 mg total) by mouth 2 (two) times a day with meals for 7 days 14 tablet 0    onabotulinumtoxin A (BOTOX) 100 units Inject as directed      phentermine (ADIPEX-P) 37 5 MG tablet Take 1 tablet (37 5 mg total) by mouth daily 30 tablet 2    pregabalin (LYRICA) 25 mg capsule Take 1 capsule (25 mg total) by mouth 2 (two) times a day 60 capsule 3    Syringe/Needle, Disp, (SYRINGE 3CC/56TT5-3/4") 27G X 1-1/4" 3 ML MISC Use for IM injection of ketorolac  4 each 0    vitamin B-12 (VITAMIN B-12) 1,000 mcg tablet Take 1,000 mcg by mouth daily       Current Facility-Administered Medications   Medication Dose Route Frequency Provider Last Rate Last Admin    cyanocobalamin injection 1,000 mcg  1,000 mcg Intramuscular Q30 Days Sanjiv Chacon, DO   1,000 mcg at 08/03/20 9436    cyanocobalamin injection 1,000 mcg  1,000 mcg Intramuscular Q14 Days Sanjiv Chacon, DO   1,000 mcg at 05/18/20 1146    cyanocobalamin injection 1,000 mcg  1,000 mcg Intramuscular Q30 Days Sanjiv Chacon, DO   1,000 mcg at 09/01/20 1129        Allergies   Allergen Reactions    Desvenlafaxine      Other reaction(s): state of confusion    Valproic Acid Other (See Comments)     Other reaction(s): dilated pupils, "schizi"    Tizanidine Anxiety       Review of Systems    Video Exam    There were no vitals filed for this visit  Physical Exam     I spent 50 minutes directly with the patient during this visit      VIRTUAL VISIT [address] Mar[address]o acknowledges that she has consented to an online visit or consultation  She understands that the online visit is based solely on information provided by her, and that, in the absence of a face-to-face physical evaluation by the physician, the diagnosis she receives is both limited and provisional in terms of accuracy and completeness  This is not intended to replace a full medical face-to-face evaluation by the physician  Gio Kinney understands and accepts these terms

## 2021-05-18 ENCOUNTER — OFFICE VISIT (OUTPATIENT)
Dept: PHYSICAL THERAPY | Age: 31
End: 2021-05-18
Payer: COMMERCIAL

## 2021-05-18 DIAGNOSIS — M25.50 ARTHRALGIA OF MULTIPLE SITES: Primary | ICD-10-CM

## 2021-05-18 DIAGNOSIS — M79.7 FIBROMYALGIA: ICD-10-CM

## 2021-05-18 PROCEDURE — 97113 AQUATIC THERAPY/EXERCISES: CPT | Performed by: PHYSICAL THERAPIST

## 2021-05-18 NOTE — PROGRESS NOTES
Daily Note     Today's date: 2021  Patient name: Callie Corley  : 1990  MRN: [id number]  Referring provider: Graciela Barton PA-C  Dx:   Encounter Diagnosis     ICD-10-CM    1  Arthralgia of multiple sites  M25 50    2  Fibromyalgia  M79 7                   Subjective: Patient reports that most pain and dysfunction is related to hip and knee pain  Objective: See treatment diary below      Assessment: Tolerated treatment well  Patient would benefit from continued PT      Plan: Continue per plan of care  Precautions: fibromyalgia, chronic fatigue      Daily Treatment Diary     Manual                                                                                   Exercise Diary                           Walking                                       Seated hamstring stretch x4 hold 20 sec  Seated piriformis stretch x4 hold 20 sec                           Standing hip flexion 2x10            Standing hip abd 2x10                         Standing knee flexion 2x15            Step ups 2x10                         Pony cycling x5 minutes            Pony cross country skiing x5 minutes            Heel raises/toe raises 2x15            Heel-to-toe balance x10 reps each                                                       Modalities                                                                Manuals       Measurements IE                               Neuro Re-Ed         SLS        Tandem on foam                                                Ther Ex        Gastroc stretch standing 2x20" B       Seated ant tib stretch 2x10"       Clam                                                Ther Activity        Sit <>stand 5x       Step up/down        Gait Training                        Modalities

## 2021-05-21 ENCOUNTER — OFFICE VISIT (OUTPATIENT)
Dept: PHYSICAL THERAPY | Age: 31
End: 2021-05-21
Payer: COMMERCIAL

## 2021-05-21 DIAGNOSIS — M25.50 ARTHRALGIA OF MULTIPLE SITES: Primary | ICD-10-CM

## 2021-05-21 PROCEDURE — 97110 THERAPEUTIC EXERCISES: CPT | Performed by: PHYSICAL THERAPIST

## 2021-05-21 NOTE — PROGRESS NOTES
Daily Note     Today's date: 2021  Patient name: Gio Kinney  : 1990  MRN: [id number]  Referring provider: Winston Jenkins PA-C  Dx:   Encounter Diagnosis     ICD-10-CM    1  Arthralgia of multiple sites  M25 50                   Subjective: No significant increase in sx's noted after pool treatment  Objective: See treatment diary below      Assessment: Tolerated treatment well  Patient would benefit from continued PT      Plan: Continue per plan of care  Precautions: fibromyalgia, chronic fatigue      Daily Treatment Diary     Manual                                                                                   Exercise Diary                           Walking                                       Seated hamstring stretch x4 hold 20 sec  Seated piriformis stretch x4 hold 20 sec  Standing hip flexion 2x10            Standing hip abd 2x10                         Standing knee flexion 2x15            Step ups 2x10                         Pony cycling x5 minutes            Pony cross country skiing x5 minutes            Heel raises/toe raises 2x15            Heel-to-toe balance x10 reps each                                                       Modalities                                                                Manuals       Measurements IE LC - 10 minutes                              Neuro Re-Ed         SLS        Tandem on foam                                                Ther Ex        Supine piriformis stretch  x5 reps hold 20 sec        SLR flexion  2x10      SLR abd  2x10              cybex leg press  3x10 - 40#      cybex knee flexion  3x10 -       SLS on blue foam  x10 hold 5 sec each                                                                                      Ther Activity                        Gait Training                        Modalities

## 2021-05-24 ENCOUNTER — OFFICE VISIT (OUTPATIENT)
Dept: PSYCHIATRY | Facility: CLINIC | Age: 31
End: 2021-05-24
Payer: COMMERCIAL

## 2021-05-24 DIAGNOSIS — F33.1 DEPRESSION, MAJOR, RECURRENT, MODERATE (HCC): Primary | Chronic | ICD-10-CM

## 2021-05-24 DIAGNOSIS — F41.1 GENERALIZED ANXIETY DISORDER: ICD-10-CM

## 2021-05-24 PROCEDURE — 99213 OFFICE O/P EST LOW 20 MIN: CPT | Performed by: PSYCHIATRY & NEUROLOGY

## 2021-05-24 RX ORDER — ESCITALOPRAM OXALATE 10 MG/1
10 TABLET ORAL DAILY
Qty: 30 TABLET | Refills: 2 | Status: SHIPPED | OUTPATIENT
Start: 2021-05-24 | End: 2021-09-03 | Stop reason: SDUPTHER

## 2021-05-24 RX ORDER — ADALIMUMAB 40MG/0.4ML
KIT SUBCUTANEOUS
COMMUNITY
Start: 2021-04-11 | End: 2021-12-10

## 2021-05-24 NOTE — PSYCH
Subjective: Medication Management      Patient ID: Bertha Johnson is a 35 y o  female  HPI ROS Appetite Changes and Sleep: normal appetite, normal energy level, no weight change and normal number of sleep hours   Stephanie remains compliant with her medications and denies side effects  She denies recent health changes or new medications  She stated she still struggles with anxiety but has not had any panic attacks lately  She denies feeling depressed  She continues to meet with her counselor on a regular basis  She also continues to follow up with her medical care for fibromyalgia and chronic pain  She is frustrated because she has to live with chronic pain and fatigue  She agrees to continue current treatment as prescribed and will schedule follow up in 3 months or sooner if needed  Review Of Systems:     Mood Anxiety and Depression   Behavior Normal    Thought Content Disturbing Thoughts, Feelings   General Emotional Problems and Decreased Functioning   Personality Normal   Other Psych Symptoms Normal   Constitutional Negative   ENT Negative   Cardiovascular Negative   Respiratory Negative   Gastrointestinal Negative   Genitourinary Negative   Musculoskeletal Negative   Integumentary Negative   Neurological Negative   Endocrine Normal    Other Symptoms Normal              Laboratory Results: No results found for this or any previous visit      Substance Abuse History:  Social History     Substance and Sexual Activity   Drug Use Yes    Types: Marijuana    Comment: Medical marijuana       Family Psychiatric History:   Family History   Problem Relation Age of Onset    Rheum arthritis Mother     Psoriasis Mother     Other Mother     Hypertension Mother     Diabetes unspecified Mother     Sjogren's syndrome Mother     Alcohol abuse Mother     Drug abuse Mother     Anxiety disorder Father     Alcohol abuse Father     Cancer Other     Diabetes Other     Other Other         High blood pressure    Depression Maternal Grandmother        The following portions of the patient's history were reviewed and updated as appropriate: allergies, current medications, past family history, past medical history, past social history, past surgical history and problem list     Social History     Socioeconomic History    Marital status: Single     Spouse name: Not on file    Number of children: 0    Years of education: 15    Highest education level: Not on file   Occupational History    Occupation: Unemployed     Employer: [employer]   Social Needs    Financial resource strain: Very hard    Food insecurity     Worry: Often true     Inability: Never true    Transportation needs     Medical: No     Non-medical: No   Tobacco Use    Smoking status: Never Smoker    Smokeless tobacco: Never Used   Substance and Sexual Activity    Alcohol use: No    Drug use: Yes     Types: Marijuana     Comment: Medical marijuana    Sexual activity: Yes     Partners: Male     Comment: Nuva ring   Lifestyle    Physical activity     Days per week: 0 days     Minutes per session: Not on file    Stress: Not on file   Relationships    Social connections     Talks on phone: Not on file     Gets together: Not on file     Attends Rastafari service: Not on file     Active member of club or organization: Not on file     Attends meetings of clubs or organizations: Not on file     Relationship status: Not on file    Intimate partner violence     Fear of current or ex partner: Not on file     Emotionally abused: Not on file     Physically abused: Not on file     Forced sexual activity: Not on file   Other Topics Concern    Not on file   Social History Narrative    Home:  Living with mom and MGM        Education:    Pt denies any h/o learning disability Dxs but admits to having great difficulty in math requiring a   She reached childhood milestones on time as far as he knows      Graduated HS 2009    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 college art classes--she stopped due to anxiety from dissatisfaction with her classes--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 felt they were telling her what to create  On reflection, she feels it was moreso her anxiety as the cause of her leaving school, and she was using the other reason as an excuse so she would not have to admit to anxiety at that time  She is now very open about her anxiety and does not mind that I have this information in the general social section of her chart  Social History     Social History Narrative    Home:  Living with mom and MGM        Education:    Pt denies any h/o learning disability Dxs but admits to having great difficulty in math requiring a   She reached childhood milestones on time as far as he knows  Graduated HS 2009    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 college art classes--she stopped due to anxiety from dissatisfaction with her classes--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 felt they were telling her what to create  On reflection, she feels it was moreso her anxiety as the cause of her leaving school, and she was using the other reason as an excuse so she would not have to admit to anxiety at that time  She is now very open about her anxiety and does not mind that I have this information in the general social section of her chart         Objective:       Mental status:  Appearance calm and cooperative , adequate hygiene and grooming and good eye contact    Mood dysphoric   Affect affect was constricted   Speech a normal rate and fluent   Thought Processes coherent/organized and normal thought processes   Hallucinations no hallucinations present    Thought Content no delusions   Abnormal Thoughts no suicidal thoughts  and no homicidal thoughts    Orientation  oriented to person and place and time   Remote Memory short term memory intact and long term memory intact   Attention Span concentration intact   Intellect Appears to be of Average Intelligence   Insight Limited insight   Judgement judgment was limited   Muscle Strength Muscle strength and tone were normal and Normal gait    Language no difficulty naming common objects and no difficulty repeating a phrase    Fund of Knowledge displays adequate knowledge of current events               Assessment/Plan:       Diagnoses and all orders for this visit:    Depression, major, recurrent, moderate (HCC)    Generalized anxiety disorder  -     escitalopram (LEXAPRO) 10 mg tablet; Take 1 tablet (10 mg total) by mouth daily    Other orders  -     Adalimumab (Humira Pen) 40 MG/0 4ML PNKT; Inject 0 4 mL (40 mg total) under the skin every 14 (fourteen) days  Treatment Recommendations- Risks Benefits      Immediate Medical/Psychiatric/Psychotherapy Treatments and Any Precautions: continue current treatment     Risks, Benefits And Possible Side Effects Of Medications:  {PSYCH RISK, BENEFITS AND POSSIBLE SIDE EFFECTS (Optional):85987    Psychotherapy Provided:     Individual psychotherapy provided: Yes  Counseling was provided during the session today for 16 minutes  Medications, treatment progress and treatment plan reviewed with Cruz  Medication education provided to [place]  Goals discussed during in session: continue improvement in depression  Recent stressor including health issues, medical problems and chronic pain discussed with Cruz  Coping strategies including compliance with medications, contacting a therapist, deep/slow breathing, eliminating avoidance, engaging in previously avoided activities, exercising, getting into a good routine, increasing energy, increasing interest in usual activities, increasing motivation, increasing self-reward for positive behavior, increasing self-reward for positive thoughts, maintain healthy diet, maintain heathy sleeping hygiene and maintain positive attitude reviewed with Cruz  Importance of medication and treatment compliance reviewed with Cruz  Educated on importance of medication and treatment compliance  Importance of follow up with family physician for medical issues reviewed with Cruz  Discussed with Cruz acceptance of mental illness diagnosis and need for ongoing psychiatric treatment  Supportive therapy provided

## 2021-05-25 ENCOUNTER — OFFICE VISIT (OUTPATIENT)
Dept: PHYSICAL THERAPY | Age: 31
End: 2021-05-25
Payer: COMMERCIAL

## 2021-05-25 ENCOUNTER — OFFICE VISIT (OUTPATIENT)
Dept: URGENT CARE | Facility: CLINIC | Age: 31
End: 2021-05-25
Payer: COMMERCIAL

## 2021-05-25 VITALS
DIASTOLIC BLOOD PRESSURE: 74 MMHG | WEIGHT: 230 LBS | SYSTOLIC BLOOD PRESSURE: 127 MMHG | RESPIRATION RATE: 18 BRPM | HEART RATE: 109 BPM | TEMPERATURE: 97.6 F | BODY MASS INDEX: 40.75 KG/M2 | OXYGEN SATURATION: 96 % | HEIGHT: 63 IN

## 2021-05-25 DIAGNOSIS — R60.0 LOWER EXTREMITY EDEMA: Primary | ICD-10-CM

## 2021-05-25 DIAGNOSIS — M25.50 ARTHRALGIA OF MULTIPLE SITES: Primary | ICD-10-CM

## 2021-05-25 PROCEDURE — 99213 OFFICE O/P EST LOW 20 MIN: CPT | Performed by: NURSE PRACTITIONER

## 2021-05-25 PROCEDURE — 97113 AQUATIC THERAPY/EXERCISES: CPT | Performed by: PHYSICAL THERAPIST

## 2021-05-25 NOTE — PROGRESS NOTES
Daily Note     Today's date: 2021  Patient name: Gio Kinney  : 1990  MRN: [id number]  Referring provider: Winston Jenkins PA-C  Dx:   Encounter Diagnosis     ICD-10-CM    1  Arthralgia of multiple sites  M25 50                   Subjective: Patient notes modest gains      Objective: See treatment diary below      Assessment: Tolerated treatment well  Patient would benefit from continued PT      Plan: Continue per plan of care  Precautions: fibromyalgia, chronic fatigue      Daily Treatment Diary     Manual                                                                                  Exercise Diary                           Walking                                       Seated hamstring stretch x4 hold 20 sec  x4           Seated piriformis stretch x4 hold 20 sec  x4                        Standing hip flexion 2x10 2x10           Standing hip abd 2x10 2x10                        Standing knee flexion 2x15 2x15           Step ups 2x10 2x10                        Pony cycling x5 minutes x5           Pony cross country skiing x5 minutes x5           Heel raises/toe raises 2x15 2x15           Heel-to-toe balance x10 reps each x10                                                      Modalities                                                                Manuals       Measurements IE LC - 10 minutes                              Neuro Re-Ed         SLS        Tandem on foam                                                Ther Ex        Supine piriformis stretch  x5 reps hold 20 sec        SLR flexion  2x10      SLR abd  2x10              cybex leg press  3x10 - 40#      cybex knee flexion  3x10 -       SLS on blue foam  x10 hold 5 sec each                                                                                      Ther Activity                        Gait Training                        Modalities

## 2021-05-25 NOTE — PROGRESS NOTES
[facility]        NAME: Matthew Guillaume is a 35 y o  female  : 1990    MRN: [id number]  DATE: May 25, 2021  TIME: 6:38 PM    Assessment and Plan   Lower extremity edema [R60 0]  1  Lower extremity edema           Patient Instructions     Patient Instructions   Elevate and wear compression socks as discussed  If he develops any chest pain, shortness of breath, palpitations, calf pain, redness, warmth, or swelling, any new or concerning symptoms please proceed ER  Advised follow-up with PCP in 1-2 days        Edema   WHAT YOU NEED TO KNOW:   Edema is swelling throughout your body  Edema is usually a sign that you are retaining fluid  The swelling may be caused by heart failure or kidney, thyroid, or liver disease  It may also be caused by medicines such as antidepressants, blood pressure medicines, or hormones  Sudden swelling around the lips or face may be a sign of a severe allergic reaction  Swelling of an arm or leg may be caused by blockage of your veins  DISCHARGE INSTRUCTIONS:   Return to the emergency department if:   · You have shortness of breath at rest, especially when you lie down  · You cough up pink, foamy sputum  · You have chest pain  · Your heartbeat is fast or uneven  Contact your healthcare provider if:   · The swollen area feels cold and is pale or blue in color  · The swollen area feels warm, painful, and is red in color  · You have increased swelling or swelling in other parts of your body  · You have questions or concerns about your condition or care  Medicines:   · Medicines  help to get rid of extra body fluid  · Take your medicine as directed  Contact your healthcare provider if you think your medicine is not helping or if you have side effects  Tell him or her if you are allergic to any medicine  Keep a list of the medicines, vitamins, and herbs you take  Include the amounts, and when and why you take them   Bring the list or the pill bottles to follow-up visits  Carry your medicine list with you in case of an emergency  Follow up with your healthcare provider as directed:  Write down your questions so you remember to ask them during your visits  Manage edema:   · Elevate  your arms or legs as directed  Raise them above the level of your heart as often as you can  This will help decrease swelling and pain  Prop them on pillows or blankets to keep them elevated comfortably  · Wear pressure stockings as directed  The stockings are tight and put pressure on your legs  This helps to keep fluid from collecting in your legs or ankles  · Limit your salt intake  Salt causes your body to hold water  Ask about any other changes to your diet  · Stay active  Do not stand or sit for long periods of time  Ask your healthcare provider about the best exercise plan for you  · Keep your skin moist  using lotion, cream, or ointment  Ask your healthcare provider what to use and how often to use it  © Copyright [address] Information is for End User's use only and may not be sold, redistributed or otherwise used for commercial purposes  All illustrations and images included in CareNotes® are the copyrighted property of A D A M , Inc  or [address] [place], [place] [address]fany   The above information is an  only  It is not intended as medical advice for individual conditions or treatments  Talk to your doctor, nurse or pharmacist before following any medical regimen to see if it is safe and effective for you  Follow up with PCP in 3-5 days  Proceed to  ER if symptoms worsen  Chief Complaint     Chief Complaint   Patient presents with    Leg Swelling     since thursday feet , ankles , calves         History of Present Illness        Patient is a 58-year-old female who presents with a one-week history of lower extremity edema  Patient notes she does take Humira and Lyrica which can cause swelling    States she has been on Humira for a few months and has been on Lyrica for approximately 2 years  Patient denies any chest pain, shortness of breath, or palpitations  Denies any calf pain, swelling, warmth, or redness  Denies any history of DVT or PE  Denies any fever, chills, or body aches  Denies any numbness, tingling, weakness of extremities  Review of Systems   Review of Systems   Constitutional: Negative for chills, diaphoresis, fatigue and fever  HENT: Negative  Respiratory: Negative for cough, chest tightness, shortness of breath, wheezing and stridor  Cardiovascular: Positive for leg swelling  Negative for chest pain and palpitations  Gastrointestinal: Negative  Musculoskeletal: Negative for arthralgias, back pain, joint swelling, myalgias, neck pain and neck stiffness  Skin: Negative for rash and wound  Neurological: Negative for dizziness, syncope, weakness, light-headedness, numbness and headaches  Current Medications       Current Outpatient Medications:     acetaminophen (TYLENOL) 500 mg tablet, Take 2 tablets (1,000 mg total) by mouth every 6 (six) hours as needed for mild pain or moderate pain, Disp: 100 tablet, Rfl: 0    Adalimumab (Humira Pen) 40 MG/0 4ML PNKT, Inject 0 4 mL (40 mg total) under the skin every 14 (fourteen) days  , Disp: , Rfl:     Aimovig 140 MG/ML SOAJ, Inject 140mg under the skin every 30 (thirty) days  , Disp: 1 pen, Rfl: 10    ALPRAZolam (XANAX) 0 5 mg tablet, Take 1 tablet (0 5 mg total) by mouth daily as needed for anxiety, Disp: 30 tablet, Rfl: 0    ARIPiprazole (ABILIFY) 2 mg tablet, Take 1 tablet (2 mg total) by mouth daily, Disp: 30 tablet, Rfl: 2    buPROPion (WELLBUTRIN XL) 150 mg 24 hr tablet, Take 1 tablet (150 mg total) by mouth daily, Disp: 30 tablet, Rfl: 2    Cholecalciferol (VITAMIN D3) 31369 units CAPS, Take by mouth once a week, Disp: , Rfl:     dexamethasone (DECADRON) 2 mg tablet, 1 tab qam with food prn migraine  , Disp: 5 tablet, Rfl: 0   escitalopram (LEXAPRO) 10 mg tablet, Take 1 tablet (10 mg total) by mouth daily, Disp: 30 tablet, Rfl: 2    ketorolac (TORADOL) 10 mg tablet, Take 1 tablet (10 mg total) by mouth every 6 (six) hours as needed (migraine) Max 2-3 per week , Disp: 10 tablet, Rfl: 0    melatonin 1 mg, 1-2 tabs qhs prn insomnia, Disp: 60 tablet, Rfl: 2    metoclopramide (REGLAN) 10 mg tablet, Take 1 tablet (10 mg total) by mouth 3 (three) times a day as needed (headache or nausea), Disp: 30 tablet, Rfl: 0    onabotulinumtoxin A (BOTOX) 100 units, Inject as directed, Disp: , Rfl:     phentermine (ADIPEX-P) 37 5 MG tablet, Take 1 tablet (37 5 mg total) by mouth daily, Disp: 30 tablet, Rfl: 2    pregabalin (LYRICA) 25 mg capsule, Take 1 capsule (25 mg total) by mouth 2 (two) times a day, Disp: 60 capsule, Rfl: 3    vitamin B-12 (VITAMIN B-12) 1,000 mcg tablet, Take 1,000 mcg by mouth daily, Disp: , Rfl:     B-D 3CC LUER-DEANN SYR 25GX1" 25G X 1" 3 ML MISC, , Disp: , Rfl:     etonogestrel-ethinyl estradiol (NuvaRing) 0 12-0 015 MG/24HR vaginal ring, Insert ring for 21 days then remove for one week , Disp: 3 each, Rfl: 3    indomethacin (INDOCIN) 25 mg capsule, 1-2 tabs BID PRN migraine WITH FOOD  Hold toradol , Disp: 15 capsule, Rfl: 2    lidocaine (LIDODERM) 5 %, Apply 1 patch topically daily Remove & Discard patch within 12 hours or as directed by MD (Patient not taking: Reported on 5/25/2021), Disp: 6 patch, Rfl: 0    methocarbamol (ROBAXIN) 500 mg tablet, Take 1 tablet (500 mg total) by mouth 3 (three) times a day as needed for muscle spasms (Patient not taking: Reported on 5/25/2021), Disp: 30 tablet, Rfl: 0    naproxen (NAPROSYN) 500 mg tablet, Take 1 tablet (500 mg total) by mouth 2 (two) times a day with meals for 7 days, Disp: 14 tablet, Rfl: 0    Syringe/Needle, Disp, (SYRINGE 3CC/78CU9-8/4") 27G X 1-1/4" 3 ML MISC, Use for IM injection of ketorolac   (Patient not taking: Reported on 5/25/2021), Disp: 4 each, Rfl: 0    Current Facility-Administered Medications:     cyanocobalamin injection 1,000 mcg, 1,000 mcg, Intramuscular, Q30 Days, Charito Nunnery, DO, 1,000 mcg at 08/03/20 3717    cyanocobalamin injection 1,000 mcg, 1,000 mcg, Intramuscular, Q14 Days, Charito Nunnery, DO, 1,000 mcg at 05/18/20 1146    cyanocobalamin injection 1,000 mcg, 1,000 mcg, Intramuscular, Q30 Days, Charito Nunnery, DO, 1,000 mcg at 09/01/20 1129    Current Allergies     Allergies as of 05/25/2021 - Reviewed 05/25/2021   Allergen Reaction Noted    Desvenlafaxine  11/26/2012    Valproic acid Other (See Comments) 10/18/2017    Tizanidine Anxiety 01/20/2021            The following portions of the patient's history were reviewed and updated as appropriate: allergies, current medications, past family history, past medical history, past social history, past surgical history and problem list      Past Medical History:   Diagnosis Date    Abnormal Pap smear of cervix     Anxiety     Arthritis     Depression     Fibromyalgia, primary     Migraine     Vitamin B12 deficiency        Past Surgical History:   Procedure Laterality Date    COLONOSCOPY N/A 5/8/2018    Procedure: COLONOSCOPY;  Surgeon: Gaston Cooper MD;  [facility]; Service: Gastroenterology    TONSILLECTOMY      WISDOM TOOTH EXTRACTION         Family History   Problem Relation Age of Onset    Rheum arthritis Mother     Psoriasis Mother     Other Mother     Hypertension Mother     Diabetes unspecified Mother     Sjogren's syndrome Mother     Alcohol abuse Mother     Drug abuse Mother     Anxiety disorder Father     Alcohol abuse Father     Cancer Other     Diabetes Other     Other Other         High blood pressure    Depression Maternal Grandmother          Medications have been verified          Objective   /74   Pulse (!) 109   Temp 97 6 °F (36 4 °C)   Resp 18   Ht 5' 3" (1 6 m)   Wt 104 kg (230 lb)   LMP 03/25/2021 Comment: irregular Perriods SpO2 96%   BMI 40 74 kg/m²   Patient's last menstrual period was 2021  Physical Exam     Physical Exam  Constitutional:       General: She is not in acute distress  Appearance: Normal appearance  She is not diaphoretic  HENT:      Head: Normocephalic and atraumatic  Cardiovascular:      Rate and Rhythm: Normal rate and regular rhythm  Pulses: Normal pulses  Heart sounds: Normal heart sounds, S1 normal and S2 normal       Comments: No calf pain, swelling, redness or warmth  -homans sign  Pulmonary:      Effort: Pulmonary effort is normal       Breath sounds: Normal breath sounds and air entry  Musculoskeletal:      Right lower le+ Edema present  Left lower le+ Edema present  Skin:     General: Skin is warm and dry  Capillary Refill: Capillary refill takes less than 2 seconds  Neurological:      Mental Status: She is alert and oriented to person, place, and time

## 2021-05-26 ENCOUNTER — OFFICE VISIT (OUTPATIENT)
Dept: FAMILY MEDICINE CLINIC | Facility: CLINIC | Age: 31
End: 2021-05-26
Payer: COMMERCIAL

## 2021-05-26 VITALS
WEIGHT: 232 LBS | SYSTOLIC BLOOD PRESSURE: 122 MMHG | HEIGHT: 63 IN | BODY MASS INDEX: 41.11 KG/M2 | TEMPERATURE: 96.5 F | DIASTOLIC BLOOD PRESSURE: 88 MMHG

## 2021-05-26 DIAGNOSIS — R60.0 LOWER EXTREMITY EDEMA: Primary | ICD-10-CM

## 2021-05-26 PROCEDURE — 99213 OFFICE O/P EST LOW 20 MIN: CPT | Performed by: FAMILY MEDICINE

## 2021-05-26 RX ORDER — FUROSEMIDE 20 MG/1
20 TABLET ORAL DAILY PRN
Qty: 30 TABLET | Refills: 5 | Status: SHIPPED | OUTPATIENT
Start: 2021-05-26 | End: 2021-07-16 | Stop reason: SDUPTHER

## 2021-05-26 NOTE — PROGRESS NOTES
Assessment/Plan: patient have laboratory studies for lower extremity edema  Patient will try to elevate legs when able to  Patient have low-sodium diet  Patient use diuretic as directed intermittently  Further workup if symptoms persist   Patient will follow up in the next few weeks if not seeing improvement       Diagnoses and all orders for this visit:    Lower extremity edema  -     NT-BNP PRO; Future  -     CBC and differential; Future  -     Comprehensive metabolic panel; Future  -     Lipid panel; Future  -     TSH, 3rd generation with Free T4 reflex; Future  -     furosemide (LASIX) 20 mg tablet; Take 1 tablet (20 mg total) by mouth daily as needed (Lower extremity edema)            Subjective:        Patient ID: Chaka Moeller is a 35 y o  female  Patient is here with swelling of lower extremities over the past week or so  Patient was seen at urgent care  No significant redness noted  No fevers or chills chest pain shortness of breath problems urinating or defecating  Patient has been on prednisone for the past 4 days without any significant improvement  No recent trips or trauma noted to lower extremities  The following portions of the patient's history were reviewed and updated as appropriate: allergies, current medications, past family history, past medical history, past social history, past surgical history and problem list       Review of Systems   Constitutional: Negative  HENT: Negative  Eyes: Negative  Respiratory: Negative  Cardiovascular: Positive for leg swelling  Gastrointestinal: Negative  Endocrine: Negative  Genitourinary: Negative  Musculoskeletal: Negative  Skin: Negative  Allergic/Immunologic: Negative  Neurological: Negative  Hematological: Negative  Psychiatric/Behavioral: Negative  Objective:      BMI Counseling: Body mass index is 41 1 kg/m²   The BMI is above normal  Nutrition recommendations include decreasing portion sizes  Exercise recommendations include moderate physical activity 150 minutes/week  /88 (BP Location: Right arm, Patient Position: Sitting, Cuff Size: Large)   Temp (!) 96 5 °F (35 8 °C) (Tympanic)   Ht 5' 3" (1 6 m)   Wt 105 kg (232 lb)   BMI 41 10 kg/m²          Physical Exam  Vitals signs and nursing note reviewed  Constitutional:       General: She is not in acute distress  Appearance: Normal appearance  She is not ill-appearing, toxic-appearing or diaphoretic  HENT:      Head: Normocephalic and atraumatic  Right Ear: Tympanic membrane, ear canal and external ear normal  There is no impacted cerumen  Left Ear: Tympanic membrane, ear canal and external ear normal  There is no impacted cerumen  Nose: Nose normal  No congestion or rhinorrhea  Mouth/Throat:      Mouth: Mucous membranes are moist       Pharynx: No oropharyngeal exudate or posterior oropharyngeal erythema  Eyes:      General: No scleral icterus  Right eye: No discharge  Left eye: No discharge  Extraocular Movements: Extraocular movements intact  Conjunctiva/sclera: Conjunctivae normal       Pupils: Pupils are equal, round, and reactive to light  Neck:      Musculoskeletal: Normal range of motion and neck supple  No neck rigidity or muscular tenderness  Vascular: No carotid bruit  Cardiovascular:      Rate and Rhythm: Normal rate and regular rhythm  Pulses: Normal pulses  Heart sounds: Normal heart sounds  No murmur  No friction rub  No gallop  Pulmonary:      Effort: Pulmonary effort is normal  No respiratory distress  Breath sounds: Normal breath sounds  No stridor  No wheezing, rhonchi or rales  Chest:      Chest wall: No tenderness  Abdominal:      General: Abdomen is flat  Bowel sounds are normal  There is no distension  Palpations: Abdomen is soft  Tenderness: There is no abdominal tenderness   There is no guarding or rebound  Musculoskeletal: Normal range of motion  General: No swelling, tenderness, deformity or signs of injury  Right lower leg: Edema present  Left lower leg: Edema present  Lymphadenopathy:      Cervical: No cervical adenopathy  Skin:     General: Skin is warm and dry  Capillary Refill: Capillary refill takes less than 2 seconds  Coloration: Skin is not jaundiced  Findings: No bruising, erythema, lesion or rash  Neurological:      Mental Status: She is alert and oriented to person, place, and time  Mental status is at baseline  Cranial Nerves: No cranial nerve deficit  Sensory: No sensory deficit  Motor: No weakness  Coordination: Coordination normal       Gait: Gait normal    Psychiatric:         Mood and Affect: Mood normal          Behavior: Behavior normal          Thought Content:  Thought content normal          Judgment: Judgment normal

## 2021-05-28 ENCOUNTER — OFFICE VISIT (OUTPATIENT)
Dept: PHYSICAL THERAPY | Age: 31
End: 2021-05-28
Payer: COMMERCIAL

## 2021-05-28 DIAGNOSIS — M25.50 ARTHRALGIA OF MULTIPLE SITES: Primary | ICD-10-CM

## 2021-05-28 PROCEDURE — 97110 THERAPEUTIC EXERCISES: CPT | Performed by: PHYSICAL THERAPIST

## 2021-05-28 NOTE — PROGRESS NOTES
Daily Note     Today's date: 2021  Patient name: Richard Romero  : 1990  MRN: [id number]  Referring provider: Rigo Bartlett PA-C  Dx:   Encounter Diagnosis     ICD-10-CM    1  Arthralgia of multiple sites  M25 50                   Subjective: Patient notes modest improvement  Objective: See treatment diary below      Assessment: Tolerated treatment well  Patient would benefit from continued PT      Plan: Continue per plan of care  Precautions: fibromyalgia, chronic fatigue      Daily Treatment Diary     Manual                                                                                  Exercise Diary                           Walking                                       Seated hamstring stretch x4 hold 20 sec  x4           Seated piriformis stretch x4 hold 20 sec  x4                        Standing hip flexion 2x10 2x10           Standing hip abd 2x10 2x10                        Standing knee flexion 2x15 2x15           Step ups 2x10 2x10                        Pony cycling x5 minutes x5           Pony cross country skiing x5 minutes x5           Heel raises/toe raises 2x15 2x15           Heel-to-toe balance x10 reps each x10                                                      Modalities                                                                Manuals      Measurements IE LC - 10 minutes x10 minutes                             Neuro Re-Ed         SLS        Tandem on foam                                                Ther Ex        Supine piriformis stretch  x5 reps hold 20 sec   x5 reps     SLR flexion  2x10 2x10     SLR abd  2x10 2x10             cybex leg press  3x10 - 40# 3x10     cybex knee flexion  3x10 -  3x10     SLS on blue foam  x10 hold 5 sec each x5     Bridging with hip abd   2x10                                                                             Ther Activity                        Gait Training                        Modalities

## 2021-06-01 ENCOUNTER — OFFICE VISIT (OUTPATIENT)
Dept: PHYSICAL THERAPY | Age: 31
End: 2021-06-01
Payer: COMMERCIAL

## 2021-06-01 DIAGNOSIS — M25.50 ARTHRALGIA OF MULTIPLE SITES: Primary | ICD-10-CM

## 2021-06-01 PROCEDURE — 97113 AQUATIC THERAPY/EXERCISES: CPT | Performed by: PHYSICAL THERAPIST

## 2021-06-01 NOTE — PROGRESS NOTES
Daily Note     Today's date: 2021  Patient name: Emelina Graham  : 1990  MRN: [id number]  Referring provider: Twyla Hurtado PA-C  Dx:   Encounter Diagnosis     ICD-10-CM    1  Arthralgia of multiple sites  M25 50                   Subjective: Patient overall notes modest gains  Objective: See treatment diary below      Assessment: Tolerated treatment well  Patient would benefit from continued PT      Plan: Continue per plan of care  Precautions: fibromyalgia, chronic fatigue      Daily Treatment Diary     Manual                                                                                 Exercise Diary                           Walking                                       Seated hamstring stretch x4 hold 20 sec  x4 x4          Seated piriformis stretch x4 hold 20 sec  x4 x4                       Standing hip flexion 2x10 2x10 2x10          Standing hip abd 2x10 2x10 2x10                       Standing knee flexion 2x15 2x15 2x15          Step ups 2x10 2x10 2x10                       Pony cycling x5 minutes x5 x5          Pony cross [place] skiing x5 minutes x5 x5          Heel raises/toe raises 2x15 2x15 2x15          Heel-to-toe balance x10 reps each x10 x10                                                     Modalities                                                                Manuals      Measurements IE LC - 10 minutes x10 minutes                             Neuro Re-Ed         SLS        Tandem on foam                                                Ther Ex        Supine piriformis stretch  x5 reps hold 20 sec   x5 reps     SLR flexion  2x10 2x10     SLR abd  2x10 2x10             cybex leg press  3x10 - 40# 3x10     cybex knee flexion  3x10 -  3x10     SLS on blue foam  x10 hold 5 sec each x5     Bridging with hip abd   2x10                                                                             Ther Activity                        Gait Training Modalities

## 2021-06-02 ENCOUNTER — DOCUMENTATION (OUTPATIENT)
Dept: NEUROLOGY | Facility: CLINIC | Age: 31
End: 2021-06-02

## 2021-06-02 NOTE — PROGRESS NOTES
Type Date User Summary Attachment   General 05/26/2021 11:04 AM Emelina Galaviz care coordination -   Note    Botox - authorization # [id number] - 2nd visit - valid dates 4/1/2021 - 4/1/2022   Please use our stock      Thanks  Jarred Gama

## 2021-06-02 NOTE — PROGRESS NOTES
Patient is scheduled 7/20/2021 with [facility] in the [facility] location

## 2021-06-03 ENCOUNTER — OFFICE VISIT (OUTPATIENT)
Dept: PHYSICAL THERAPY | Age: 31
End: 2021-06-03
Payer: COMMERCIAL

## 2021-06-03 DIAGNOSIS — M25.50 ARTHRALGIA OF MULTIPLE SITES: Primary | ICD-10-CM

## 2021-06-03 PROCEDURE — 97110 THERAPEUTIC EXERCISES: CPT | Performed by: PHYSICAL THERAPIST

## 2021-06-03 NOTE — PROGRESS NOTES
Daily Note     Today's date: 6/3/2021  Patient name: Emelina Graham  : 1990  MRN: [id number]  Referring provider: Twyla Hurtado PA-C  Dx:   Encounter Diagnosis     ICD-10-CM    1  Arthralgia of multiple sites  M25 50                   Subjective: Patient notes increased generalized soreness today - hips and knees      Objective: See treatment diary below      Assessment: Tolerated treatment well  Patient would benefit from continued PT      Plan: Continue per plan of care  Precautions: fibromyalgia, chronic fatigue      Daily Treatment Diary     Manual                                                                                 Exercise Diary                           Walking                                       Seated hamstring stretch x4 hold 20 sec  x4 x4          Seated piriformis stretch x4 hold 20 sec  x4 x4                       Standing hip flexion 2x10 2x10 2x10          Standing hip abd 2x10 2x10 2x10                       Standing knee flexion 2x15 2x15 2x15          Step ups 2x10 2x10 2x10                       Pony cycling x5 minutes x5 x5          Pony cross country skiing x5 minutes x5 x5          Heel raises/toe raises 2x15 2x15 2x15          Heel-to-toe balance x10 reps each x10 x10                                                     Modalities                                                                Manuals 5/13 5/21 5/28 6/3    Measurements IE LC - 10 minutes x10 minutes x10 minutes                            Neuro Re-Ed         SLS        Tandem on foam                                                Ther Ex        Supine piriformis stretch  x5 reps hold 20 sec   x5 reps x5 reps    SLR flexion  2x10 2x10 Standing today    SLR abd  2x10 2x10 Standing today            cybex leg press  3x10 - 40# 3x10 3x10    cybex knee flexion  3x10 -  3x10 3x10    SLS on blue foam  x10 hold 5 sec each x5 x5    Bridging with hip abd   2x10 3x10    Cybex hip abd    2 plates - 3x10                                                                    Ther Activity                        Gait Training                        Modalities

## 2021-06-04 ENCOUNTER — TELEMEDICINE (OUTPATIENT)
Dept: BEHAVIORAL/MENTAL HEALTH CLINIC | Facility: CLINIC | Age: 31
End: 2021-06-04
Payer: COMMERCIAL

## 2021-06-04 DIAGNOSIS — F33.1 DEPRESSION, MAJOR, RECURRENT, MODERATE (HCC): Primary | Chronic | ICD-10-CM

## 2021-06-04 DIAGNOSIS — F41.1 GENERALIZED ANXIETY DISORDER: Chronic | ICD-10-CM

## 2021-06-04 PROCEDURE — 90834 PSYTX W PT 45 MINUTES: CPT | Performed by: SOCIAL WORKER

## 2021-06-04 NOTE — PSYCH
Virtual Regular Visit    This note was not shared with the patient due to this is a psychotherapy note    Assessment/Plan:    Problem List Items Addressed This Visit        Other    Generalized anxiety disorder (Chronic)    Depression, major, recurrent, moderate (HCC) - Primary (Chronic)          Goals addressed in session: Goal 1        Reason for visit is Behavioral Health session, conducted through video, due to COVID-19 precautions  Retabriannadebbie Genaro has verbalized a preference to continue with virtual sessions at this time  Mango Lam has been offered in-person sessions and has declined  Encounter provider [facility]    Provider located at [address] [place] [id number] [phone]      Recent Visits  No visits were found meeting these conditions  Showing recent visits within past 7 days and meeting all other requirements     Future Appointments  No visits were found meeting these conditions  Showing future appointments within next 150 days and meeting all other requirements        The patient was identified by name and date of birth  Reta Ybarra was informed that this is a telemedicine visit and that the visit is being conducted through [facility] and patient was informed that this is a secure, HIPAA-compliant platform  She agrees to proceed     My office door was closed  No one else was in the room  She acknowledged consent and understanding of privacy and security of the video platform  The patient has agreed to participate and understands they can discontinue the visit at any time  Patient is aware this is a billable service  Narcisa Banda is a 35 y o  female  DATA: Met with Stephanie for scheduled individual session  Topics of discussion included relationships with family and physical health concerns  Feliz Stark discussed her physical health issues   She states that she has been experiencing significant edema  She has started to attend physical therapy and is enjoying aqua-therapy  She states that she experiences less pain when in the pool  She discussed her current negative body image  Nae Barr states that she wants to work on increasing her physical exercise and improve her nutrition  Client shows evidence of utilizing Mindfulness-based strategies skills to manage mental health symptoms  During this session, this clinician used the following therapeutic modalities: supportive psychotherapy, client-centered therapy, mindfulness-based strategies, DBT-informed skills, Motivational Interviewing and solution-focused therapy  ASSESSMENT: Nae Barr presents with a euthymic mood  Her affect is normal range and intensity, appropriate  Nae Barr exhibits good therapeutic rapport with this clinician  Nae Barr continues to exhibit willingness to work on treatment goals and objectives  Nae Barr presents with a minimal risk of suicide, minimal risk of self-harm, and minimal risk of harm to others  PLAN: Nae Barr will return in two weeks for the next scheduled session  Between sessions, Nae Barr will continue to increase her use of mindfulness-based strategies and will report back during the next session re: successes and barriers  At the next session, this clinician will use supportive psychotherapy, client-centered therapy, mindfulness-based strategies, DBT-informed skills, Motivational Interviewing and solution-focused therapy to address her mood regulation and relationship concerns, in an effort to assist Nae Barr with meeting treatment goals         HPI     Past Medical History:   Diagnosis Date    Abnormal Pap smear of cervix     Anxiety     Arthritis     Depression     Fibromyalgia, primary     Migraine     Vitamin B12 deficiency        Past Surgical History:   Procedure Laterality Date    COLONOSCOPY N/A 5/8/2018    Procedure: COLONOSCOPY;  Surgeon: Jaki Page MD;  Location: [facility]; Service: Gastroenterology    TONSILLECTOMY      WISDOM TOOTH EXTRACTION         Current Outpatient Medications   Medication Sig Dispense Refill    acetaminophen (TYLENOL) 500 mg tablet Take 2 tablets (1,000 mg total) by mouth every 6 (six) hours as needed for mild pain or moderate pain 100 tablet 0    Adalimumab (Humira Pen) 40 MG/0 4ML PNKT Inject 0 4 mL (40 mg total) under the skin every 14 (fourteen) days   Aimovig 140 MG/ML SOAJ Inject 140mg under the skin every 30 (thirty) days  1 pen 10    ALPRAZolam (XANAX) 0 5 mg tablet Take 1 tablet (0 5 mg total) by mouth daily as needed for anxiety 30 tablet 0    ARIPiprazole (ABILIFY) 2 mg tablet Take 1 tablet (2 mg total) by mouth daily 30 tablet 2    B-D 3CC LUER-DEANN SYR 25GX1" 25G X 1" 3 ML MISC       buPROPion (WELLBUTRIN XL) 150 mg 24 hr tablet Take 1 tablet (150 mg total) by mouth daily 30 tablet 2    Cholecalciferol (VITAMIN D3) 15748 units CAPS Take by mouth once a week      dexamethasone (DECADRON) 2 mg tablet 1 tab qam with food prn migraine  5 tablet 0    escitalopram (LEXAPRO) 10 mg tablet Take 1 tablet (10 mg total) by mouth daily 30 tablet 2    etonogestrel-ethinyl estradiol (NuvaRing) 0 12-0 015 MG/24HR vaginal ring Insert ring for 21 days then remove for one week  3 each 3    furosemide (LASIX) 20 mg tablet Take 1 tablet (20 mg total) by mouth daily as needed (Lower extremity edema) 30 tablet 5    ketorolac (TORADOL) 10 mg tablet Take 1 tablet (10 mg total) by mouth every 6 (six) hours as needed (migraine) Max 2-3 per week   10 tablet 0    lidocaine (LIDODERM) 5 % Apply 1 patch topically daily Remove & Discard patch within 12 hours or as directed by MD 6 patch 0    melatonin 1 mg 1-2 tabs qhs prn insomnia 60 tablet 2    methocarbamol (ROBAXIN) 500 mg tablet Take 1 tablet (500 mg total) by mouth 3 (three) times a day as needed for muscle spasms 30 tablet 0    metoclopramide (REGLAN) 10 mg tablet Take 1 tablet (10 mg total) by mouth 3 (three) times a day as needed (headache or nausea) 30 tablet 0    naproxen (NAPROSYN) 500 mg tablet Take 1 tablet (500 mg total) by mouth 2 (two) times a day with meals for 7 days 14 tablet 0    onabotulinumtoxin A (BOTOX) 100 units Inject as directed      phentermine (ADIPEX-P) 37 5 MG tablet Take 1 tablet (37 5 mg total) by mouth daily 30 tablet 2    pregabalin (LYRICA) 25 mg capsule Take 1 capsule (25 mg total) by mouth 2 (two) times a day 60 capsule 3    Syringe/Needle, Disp, (SYRINGE 3CC/22MT8-7/4") 27G X 1-1/4" 3 ML MISC Use for IM injection of ketorolac  4 each 0    vitamin B-12 (VITAMIN B-12) 1,000 mcg tablet Take 1,000 mcg by mouth daily       Current Facility-Administered Medications   Medication Dose Route Frequency Provider Last Rate Last Admin    cyanocobalamin injection 1,000 mcg  1,000 mcg Intramuscular Q30 Days [place] Brim, DO   1,000 mcg at 08/03/20 3560    cyanocobalamin injection 1,000 mcg  1,000 mcg Intramuscular Q14 Days [place] Brim, DO   1,000 mcg at 05/18/20 1146    cyanocobalamin injection 1,000 mcg  1,000 mcg Intramuscular Q30 Days [place] Brim, DO   1,000 mcg at 09/01/20 1129        Allergies   Allergen Reactions    Desvenlafaxine      Other reaction(s): state of confusion    Valproic Acid Other (See Comments)     Other reaction(s): dilated pupils, "schizi"    Tizanidine Anxiety       Review of Systems    Video Exam    There were no vitals filed for this visit  Physical Exam     I spent 45 minutes directly with the patient during this visit      VIRTUAL VISIT [address] [address] acknowledges that she has consented to an online visit or consultation  She understands that the online visit is based solely on information provided by her, and that, in the absence of a face-to-face physical evaluation by the physician, the diagnosis she receives is both limited and provisional in terms of accuracy and completeness   This is not intended to replace a full medical face-to-face evaluation by the physician  Shanae Alexander understands and accepts these terms

## 2021-06-06 DIAGNOSIS — G43.709 CHRONIC MIGRAINE WITHOUT AURA WITHOUT STATUS MIGRAINOSUS, NOT INTRACTABLE: ICD-10-CM

## 2021-06-07 RX ORDER — KETOROLAC TROMETHAMINE 10 MG/1
10 TABLET, FILM COATED ORAL EVERY 6 HOURS PRN
Qty: 10 TABLET | Refills: 0 | Status: SHIPPED | OUTPATIENT
Start: 2021-06-07 | End: 2021-09-13 | Stop reason: SDUPTHER

## 2021-06-08 ENCOUNTER — OFFICE VISIT (OUTPATIENT)
Dept: FAMILY MEDICINE CLINIC | Facility: CLINIC | Age: 31
End: 2021-06-08
Payer: COMMERCIAL

## 2021-06-08 ENCOUNTER — OFFICE VISIT (OUTPATIENT)
Dept: PHYSICAL THERAPY | Age: 31
End: 2021-06-08
Payer: COMMERCIAL

## 2021-06-08 VITALS
TEMPERATURE: 97.1 F | WEIGHT: 231 LBS | BODY MASS INDEX: 40.93 KG/M2 | DIASTOLIC BLOOD PRESSURE: 100 MMHG | SYSTOLIC BLOOD PRESSURE: 130 MMHG | HEIGHT: 63 IN

## 2021-06-08 DIAGNOSIS — M25.50 ARTHRALGIA OF MULTIPLE SITES: Primary | ICD-10-CM

## 2021-06-08 DIAGNOSIS — M26.629 TMJ PAIN DYSFUNCTION SYNDROME: ICD-10-CM

## 2021-06-08 DIAGNOSIS — J06.9 UPPER RESPIRATORY TRACT INFECTION, UNSPECIFIED TYPE: Primary | ICD-10-CM

## 2021-06-08 PROCEDURE — 99213 OFFICE O/P EST LOW 20 MIN: CPT | Performed by: FAMILY MEDICINE

## 2021-06-08 PROCEDURE — 97113 AQUATIC THERAPY/EXERCISES: CPT | Performed by: PHYSICAL THERAPIST

## 2021-06-08 RX ORDER — AMOXICILLIN 500 MG/1
1000 CAPSULE ORAL EVERY 12 HOURS SCHEDULED
Qty: 40 CAPSULE | Refills: 0 | Status: SHIPPED | OUTPATIENT
Start: 2021-06-08 | End: 2021-06-18

## 2021-06-08 NOTE — PROGRESS NOTES
Assessment/Plan:  Patient use ibuprofen 3 times daily with food along with ice for TMJ  Patient will start amoxicillin 2 times daily for 10 days       Diagnoses and all orders for this visit:    Upper respiratory tract infection, unspecified type  -     amoxicillin (AMOXIL) 500 mg capsule; Take 2 capsules (1,000 mg total) by mouth every 12 (twelve) hours for 10 days    TMJ pain dysfunction syndrome            Subjective:        Patient ID: Bartolo Becker is a 35 y o  female  Patient is here with left maxilla as well as mandibular pain beginning roughly a week ago  Patient now with left facial pain with some migration to the right  No fevers noted  Patient did see dentist   Patient did have x-rays done showing no abscess  Patient does notice some pharynx/tongue pain  Tylenol has been use  No rash noted        The following portions of the patient's history were reviewed and updated as appropriate: allergies, current medications, past family history, past medical history, past social history, past surgical history and problem list       Review of Systems   Constitutional: Negative  Negative for fever  HENT: Positive for congestion, ear pain, postnasal drip and rhinorrhea  Eyes: Negative  Respiratory: Negative  Cardiovascular: Negative  Gastrointestinal: Negative  Endocrine: Negative  Genitourinary: Negative  Musculoskeletal: Negative  Skin: Negative  Allergic/Immunologic: Negative  Neurological: Negative  Hematological: Negative  Psychiatric/Behavioral: Negative  Objective:               /100 (BP Location: Right arm, Patient Position: Sitting, Cuff Size: Standard)   Temp (!) 97 1 °F (36 2 °C) (Tympanic)   Ht 5' 3" (1 6 m)   Wt 105 kg (231 lb)   BMI 40 92 kg/m²          Physical Exam  Vitals signs and nursing note reviewed  Constitutional:       General: She is not in acute distress  Appearance: Normal appearance   She is not ill-appearing, toxic-appearing or diaphoretic  HENT:      Head: Normocephalic and atraumatic  Right Ear: Tympanic membrane, ear canal and external ear normal  There is no impacted cerumen  Left Ear: Tympanic membrane, ear canal and external ear normal  There is no impacted cerumen  Nose: Nose normal  No congestion or rhinorrhea  Mouth/Throat:      Mouth: Mucous membranes are moist       Pharynx: No oropharyngeal exudate or posterior oropharyngeal erythema  Eyes:      General: No scleral icterus  Right eye: No discharge  Left eye: No discharge  Extraocular Movements: Extraocular movements intact  Conjunctiva/sclera: Conjunctivae normal       Pupils: Pupils are equal, round, and reactive to light  Neck:      Musculoskeletal: Normal range of motion and neck supple  No neck rigidity or muscular tenderness  Vascular: No carotid bruit  Comments: Left submandibular gland swelling  Cardiovascular:      Rate and Rhythm: Normal rate and regular rhythm  Pulses: Normal pulses  Heart sounds: Normal heart sounds  No murmur  No friction rub  No gallop  Pulmonary:      Effort: Pulmonary effort is normal  No respiratory distress  Breath sounds: Normal breath sounds  No stridor  No wheezing, rhonchi or rales  Chest:      Chest wall: No tenderness  Musculoskeletal:         General: Tenderness present  No swelling, deformity or signs of injury  Right lower leg: No edema  Left lower leg: No edema  Comments: Left TMJ pain with palpation   Lymphadenopathy:      Cervical: No cervical adenopathy  Skin:     General: Skin is warm and dry  Capillary Refill: Capillary refill takes less than 2 seconds  Coloration: Skin is not jaundiced  Findings: No bruising, erythema, lesion or rash  Neurological:      General: No focal deficit present  Mental Status: She is alert and oriented to person, place, and time        Cranial Nerves: No cranial nerve deficit  Sensory: No sensory deficit  Motor: No weakness  Coordination: Coordination normal       Gait: Gait normal    Psychiatric:         Mood and Affect: Mood normal          Behavior: Behavior normal          Thought Content:  Thought content normal          Judgment: Judgment normal

## 2021-06-08 NOTE — PROGRESS NOTES
Daily Note     Today's date: 2021  Patient name: Cindy Mason  : 1990  MRN: [id number]  Referring provider: Reshma Galeana PA-C  Dx:   Encounter Diagnosis     ICD-10-CM    1  Arthralgia of multiple sites  M25 50                   Subjective: Patient feels like she is in a "flare up"  Increased swelling noted in b/l calf and ankles      Objective: See treatment diary below      Assessment: Tolerated treatment well  Patient would benefit from continued PT      Plan: Continue per plan of care  Precautions: fibromyalgia, chronic fatigue      Daily Treatment Diary     Manual                                                                                Exercise Diary                           Walking    x5                                   Seated hamstring stretch x4 hold 20 sec  x4 x4 x4         Seated piriformis stretch x4 hold 20 sec  x4 x4 x4                      Standing hip flexion 2x10 2x10 2x10 2x10         Standing hip abd 2x10 2x10 2x10 2x10                      Standing knee flexion 2x15 2x15 2x15 2x15         Step ups 2x10 2x10 2x10 2x10                      Pony cycling x5 minutes x5 x5 x5         Pony cross country skiing x5 minutes x5 x5 x5         Heel raises/toe raises 2x15 2x15 2x15 2x15         Heel-to-toe balance x10 reps each x10 x10 x10         Pony    Hip abd/add - 3x15                                       Modalities                                                                Manuals 5/13 5/21 5/28 6/3    Measurements IE LC - 10 minutes x10 minutes x10 minutes                            Neuro Re-Ed         SLS        Tandem on foam                                                Ther Ex        Supine piriformis stretch  x5 reps hold 20 sec   x5 reps x5 reps    SLR flexion  2x10 2x10 Standing today    SLR abd  2x10 2x10 Standing today            cybex leg press  3x10 - 40# 3x10 3x10    cybex knee flexion  3x10 -  3x10 3x10    SLS on blue foam  x10 hold 5 sec each x5 x5    Bridging with hip abd   2x10 3x10    Cybex hip abd    2 plates - 0O32                                                                    Ther Activity                        Gait Training                        Modalities

## 2021-06-09 ENCOUNTER — HOSPITAL ENCOUNTER (EMERGENCY)
Facility: HOSPITAL | Age: 31
Discharge: HOME | End: 2021-06-09
Attending: EMERGENCY MEDICINE
Payer: COMMERCIAL

## 2021-06-09 VITALS
TEMPERATURE: 98.8 F | WEIGHT: 231 LBS | HEART RATE: 101 BPM | RESPIRATION RATE: 16 BRPM | OXYGEN SATURATION: 99 % | DIASTOLIC BLOOD PRESSURE: 91 MMHG | BODY MASS INDEX: 40.92 KG/M2 | SYSTOLIC BLOOD PRESSURE: 137 MMHG

## 2021-06-09 DIAGNOSIS — M26.629 TMJ PAIN DYSFUNCTION SYNDROME: ICD-10-CM

## 2021-06-09 DIAGNOSIS — M26.622 ARTHRALGIA OF LEFT TEMPOROMANDIBULAR JOINT: Primary | ICD-10-CM

## 2021-06-09 PROCEDURE — 99283 EMERGENCY DEPT VISIT LOW MDM: CPT

## 2021-06-09 PROCEDURE — 99284 EMERGENCY DEPT VISIT MOD MDM: CPT | Performed by: EMERGENCY MEDICINE

## 2021-06-09 NOTE — ED PROVIDER NOTES
History  Chief Complaint   Patient presents with    Jaw Pain     pt presents with jaw pain more so to the left side x1 week  pt was seen by her dentist and PCP and was told it was TMJ and to take OTC pain meds  pt reports the meds are not helping with her pain     One week of pain to left TMJ worse with talking or chewing  No fever/chills/edema  No drooling/stridor/voice change/trismus  Seen by dental and XRays - told nil acute finding  PMD advised TMJ dysfunction and to take Ibuprofen  No ear pain/hearing change  Prior to Admission Medications   Prescriptions Last Dose Informant Patient Reported? Taking? ALPRAZolam (XANAX) 0 5 mg tablet  Self No No   Sig: Take 1 tablet (0 5 mg total) by mouth daily as needed for anxiety   ARIPiprazole (ABILIFY) 2 mg tablet  Self No No   Sig: Take 1 tablet (2 mg total) by mouth daily   Adalimumab (Humira Pen) 40 MG/0 4ML PNKT  Self Yes No   Sig: Inject 0 4 mL (40 mg total) under the skin every 14 (fourteen) days  Aimovig 140 MG/ML SOAJ  Self No No   Sig: Inject 140mg under the skin every 30 (thirty) days  B-D 3CC LUER-DEANN SYR 25GX1" 25G X 1" 3 ML MISC  Self Yes No   Cholecalciferol (VITAMIN D3) 68242 units CAPS  Self Yes No   Sig: Take by mouth once a week   Syringe/Needle, Disp, (SYRINGE 3CC/56XV6-8/4") 27G X 1-1/4" 3 ML MISC  Self No No   Sig: Use for IM injection of ketorolac    acetaminophen (TYLENOL) 500 mg tablet  Self No No   Sig: Take 2 tablets (1,000 mg total) by mouth every 6 (six) hours as needed for mild pain or moderate pain   amoxicillin (AMOXIL) 500 mg capsule   No No   Sig: Take 2 capsules (1,000 mg total) by mouth every 12 (twelve) hours for 10 days   buPROPion (WELLBUTRIN XL) 150 mg 24 hr tablet  Self No No   Sig: Take 1 tablet (150 mg total) by mouth daily   dexamethasone (DECADRON) 2 mg tablet  Self No No   Si tab qam with food prn migraine     escitalopram (LEXAPRO) 10 mg tablet  Self No No   Sig: Take 1 tablet (10 mg total) by mouth daily etonogestrel-ethinyl estradiol (NuvaRing) 0 12-0 015 MG/24HR vaginal ring  Self No No   Sig: Insert ring for 21 days then remove for one week     furosemide (LASIX) 20 mg tablet  Self No No   Sig: Take 1 tablet (20 mg total) by mouth daily as needed (Lower extremity edema)   ketorolac (TORADOL) 10 mg tablet  Self No No   Sig: Take 1 tablet (10 mg total) by mouth every 6 (six) hours as needed (migraine) Max 2-3 per week    lidocaine (LIDODERM) 5 %  Self No No   Sig: Apply 1 patch topically daily Remove & Discard patch within 12 hours or as directed by MD   Patient not taking: Reported on 2021   melatonin 1 mg  Self No No   Si-2 tabs qhs prn insomnia   methocarbamol (ROBAXIN) 500 mg tablet  Self No No   Sig: Take 1 tablet (500 mg total) by mouth 3 (three) times a day as needed for muscle spasms   Patient not taking: Reported on 2021   metoclopramide (REGLAN) 10 mg tablet  Self No No   Sig: Take 1 tablet (10 mg total) by mouth 3 (three) times a day as needed (headache or nausea)   naproxen (NAPROSYN) 500 mg tablet  Self No No   Sig: Take 1 tablet (500 mg total) by mouth 2 (two) times a day with meals for 7 days   onabotulinumtoxin A (BOTOX) 100 units  Self Yes No   Sig: Inject as directed   phentermine (ADIPEX-P) 37 5 MG tablet  Self No No   Sig: Take 1 tablet (37 5 mg total) by mouth daily   pregabalin (LYRICA) 25 mg capsule  Self No No   Sig: Take 1 capsule (25 mg total) by mouth 2 (two) times a day   vitamin B-12 (VITAMIN B-12) 1,000 mcg tablet  Self Yes No   Sig: Take 1,000 mcg by mouth daily      Facility-Administered Medications Last Administration Doses Remaining   cyanocobalamin injection 1,000 mcg 8/3/2020  8:59 AM    cyanocobalamin injection 1,000 mcg 2020 11:46 AM    cyanocobalamin injection 1,000 mcg 2020 11:29 AM           Past Medical History:   Diagnosis Date    Abnormal Pap smear of cervix     Anxiety     Arthritis     Depression     Fibromyalgia, primary     Migraine     Vitamin B12 deficiency        Past Surgical History:   Procedure Laterality Date    COLONOSCOPY N/A 5/8/2018    Procedure: COLONOSCOPY;  Surgeon: Silvio Resendiz MD;  Location: [facility]; Service: Gastroenterology    TONSILLECTOMY      WISDOM TOOTH EXTRACTION         Family History   Problem Relation Age of Onset    Rheum arthritis Mother     Psoriasis Mother     Other Mother     Hypertension Mother     Diabetes unspecified Mother     Sjogren's syndrome Mother     Alcohol abuse Mother     Drug abuse Mother     Anxiety disorder Father     Alcohol abuse Father     Cancer Other     Diabetes Other     Other Other         High blood pressure    Depression Maternal Grandmother      I have reviewed and agree with the history as documented  E-Cigarette/Vaping    E-Cigarette Use Former User     Start Date 7/1/19     Comments medical marijuana       E-Cigarette/Vaping Substances    Nicotine No     THC Yes     CBD No     Flavoring No     Other No     Unknown No      Social History     Tobacco Use    Smoking status: Never Smoker    Smokeless tobacco: Never Used   Substance Use Topics    Alcohol use: No    Drug use: Yes     Types: Marijuana     Comment: Medical marijuana       Review of Systems   Constitutional: Negative for fever  HENT: Negative for drooling, facial swelling, hearing loss, rhinorrhea, sore throat, trouble swallowing and voice change  Eyes: Negative for visual disturbance  Respiratory: Negative for shortness of breath  Cardiovascular: Negative for chest pain  Gastrointestinal: Negative for abdominal pain, diarrhea and vomiting  Endocrine: Negative for polydipsia  Genitourinary: Negative for dysuria, frequency and hematuria  Musculoskeletal: Negative for neck stiffness  Skin: Negative for rash  Allergic/Immunologic: Negative for immunocompromised state  Neurological: Negative for speech difficulty, weakness and numbness     Psychiatric/Behavioral: Negative for suicidal ideas  Physical Exam  Physical Exam  Constitutional:       Appearance: She is well-developed  HENT:      Head: Normocephalic and atraumatic  Left Ear: Tympanic membrane, ear canal and external ear normal  There is no impacted cerumen  Ears:      Comments: Mastoid nontender     Mouth/Throat:      Pharynx: No oropharyngeal exudate or posterior oropharyngeal erythema  Comments: Gingivitis, no focal tenderness/edema/fluctuance  Eyes:      Conjunctiva/sclera: Conjunctivae normal    Skin:     General: Skin is warm and dry  Neurological:      Mental Status: She is alert and oriented to person, place, and time           Vital Signs  ED Triage Vitals [06/09/21 1353]   Temperature Pulse Respirations Blood Pressure SpO2   98 8 °F (37 1 °C) 101 16 137/91 99 %      Temp Source Heart Rate Source Patient Position - Orthostatic VS BP Location FiO2 (%)   Tympanic Monitor -- Left arm --      Pain Score       7           Vitals:    06/09/21 1353   BP: 137/91   Pulse: 101         Visual Acuity      ED Medications  Medications - No data to display    Diagnostic Studies  Results Reviewed     None                 No orders to display              Procedures  Procedures         ED Course                                           MDM  Number of Diagnoses or Management Options  Arthralgia of left temporomandibular joint:   TMJ pain dysfunction syndrome:   Diagnosis management comments: Ambulatory referrals for OMFS and ENT      Disposition  Final diagnoses:   Arthralgia of left temporomandibular joint   TMJ pain dysfunction syndrome     Time reflects when diagnosis was documented in both MDM as applicable and the Disposition within this note     Time User Action Codes Description Comment    6/9/2021  2:13 PM Laureen Edwards Add [M26 622] Arthralgia of left temporomandibular joint     6/9/2021  2:15 PM Laureen Edwards Add [D49 461] TMJ pain dysfunction syndrome       ED Disposition     ED Disposition Condition Date/Time Comment    Discharge Stable Wed Jun 9, 2021  2:13 PM Darshan Currie discharge to home/self care  Follow-up Information     Follow up With Specialties Details Why Contact Info    Richar Vega DO Family Medicine Schedule an appointment as soon as possible for a visit in 2 days  [address]   [phone]            Discharge Medication List as of 6/9/2021  2:15 PM      CONTINUE these medications which have NOT CHANGED    Details   acetaminophen (TYLENOL) 500 mg tablet Take 2 tablets (1,000 mg total) by mouth every 6 (six) hours as needed for mild pain or moderate pain, Starting Sat 4/24/2021, Normal      Adalimumab (Humira Pen) 40 MG/0 4ML PNKT Inject 0 4 mL (40 mg total) under the skin every 14 (fourteen) days  , Historical Med      Aimovig 140 MG/ML SOAJ Inject 140mg under the skin every 30 (thirty) days  , Normal      ALPRAZolam (XANAX) 0 5 mg tablet Take 1 tablet (0 5 mg total) by mouth daily as needed for anxiety, Starting Thu 2/25/2021, Normal      amoxicillin (AMOXIL) 500 mg capsule Take 2 capsules (1,000 mg total) by mouth every 12 (twelve) hours for 10 days, Starting Tue 6/8/2021, Until Fri 6/18/2021, Normal      ARIPiprazole (ABILIFY) 2 mg tablet Take 1 tablet (2 mg total) by mouth daily, Starting Mon 4/5/2021, Normal      !! B-D 3CC LUER-DEANN SYR 25GX1" 25G X 1" 3 ML MISC Starting Thu 7/26/2018, Historical Med      buPROPion (WELLBUTRIN XL) 150 mg 24 hr tablet Take 1 tablet (150 mg total) by mouth daily, Starting Tue 4/13/2021, Normal      Cholecalciferol (VITAMIN D3) 31690 units CAPS Take by mouth once a week, Historical Med      dexamethasone (DECADRON) 2 mg tablet 1 tab qam with food prn migraine , Normal      escitalopram (LEXAPRO) 10 mg tablet Take 1 tablet (10 mg total) by mouth daily, Starting Mon 5/24/2021, Normal      etonogestrel-ethinyl estradiol (NuvaRing) 0 12-0 015 MG/24HR vaginal ring Insert ring for 21 days then remove for one week  , Normal      furosemide (LASIX) 20 mg tablet Take 1 tablet (20 mg total) by mouth daily as needed (Lower extremity edema), Starting Wed 5/26/2021, Normal      ketorolac (TORADOL) 10 mg tablet Take 1 tablet (10 mg total) by mouth every 6 (six) hours as needed (migraine) Max 2-3 per week , Starting Mon 6/7/2021, Normal      lidocaine (LIDODERM) 5 % Apply 1 patch topically daily Remove & Discard patch within 12 hours or as directed by MD, Starting Sat 4/24/2021, Normal      melatonin 1 mg 1-2 tabs qhs prn insomnia, Normal      methocarbamol (ROBAXIN) 500 mg tablet Take 1 tablet (500 mg total) by mouth 3 (three) times a day as needed for muscle spasms, Starting Sat 4/24/2021, Normal      metoclopramide (REGLAN) 10 mg tablet Take 1 tablet (10 mg total) by mouth 3 (three) times a day as needed (headache or nausea), Starting Sun 5/16/2021, Normal      naproxen (NAPROSYN) 500 mg tablet Take 1 tablet (500 mg total) by mouth 2 (two) times a day with meals for 7 days, Starting Wed 3/31/2021, Until Tue 6/8/2021, Normal      onabotulinumtoxin A (BOTOX) 100 units Inject as directed, Starting Tue 8/29/2017, Historical Med      phentermine (ADIPEX-P) 37 5 MG tablet Take 1 tablet (37 5 mg total) by mouth daily, Starting Wed 3/17/2021, Normal      pregabalin (LYRICA) 25 mg capsule Take 1 capsule (25 mg total) by mouth 2 (two) times a day, Starting u 2/4/2021, Normal      !! Syringe/Needle, Disp, (SYRINGE 3CC/31PV3-8/4") 27G X 1-1/4" 3 ML MISC Use for IM injection of ketorolac , Normal      vitamin B-12 (VITAMIN B-12) 1,000 mcg tablet Take 1,000 mcg by mouth daily, Historical Med       !! - Potential duplicate medications found  Please discuss with provider              PDMP Review       Value Time User    PDMP Reviewed  Yes 3/17/2021 11:20 AM Sanjiv Chacon DO          ED Provider  Electronically Signed by           Amalia Newby MD  06/09/21 6473

## 2021-06-09 NOTE — DISCHARGE INSTRUCTIONS
1  The examination and treatment that you have received has been on an emergency basis and is not intended as an effort to provide complete medical care  It is impossible to recognize and treat all elements of an illness or injury in a single ER visit  2  Francy Streeter for allowing us to provide emergent medical care to you or your family member  We consider it a privilege to have served you during your illness or injury  3  If you have received a PRESCRIPTION please fill it TODAY and follow the instructions carefully  4  Call your PRIMARY doctor´s office today or the next business day, and tell them you were seen at the ED and that we recommended you be prioritized for an early follow-up appointment in the next 48 hours  Please follow up with any specialists we may have discussed and provided you information on  Also, there may be some RESULTS we have found which are non-emergent but need to be followed up  When you see your primary doctor, have them call and obtain a full copy of the results from our medical records department  Please obtain a copy of the results immediately to follow up with your Primary MD  This is important for continuity of care and if not done, may lead to further issues in your medical care  5  If you do NOT have a primary care doctor, it is important that you [address] [place] [address]  Call your insurance company to get a list of eligible providers  6  RETURN to this or another ER immediately for new, worsening, or concerning symptoms  We are open 24 hours a day and you may return any time  We are happy to see you again to make sure everything is okay  7  PAIN/FEVER RELIEF FOR ADULTS: For MILD PAIN RELIEF, adults can take acetaminophen (Tylenol) 1000 mg every 6 hours (DO NOT TAKE ACETAMINOPHEN IF YOU ARE ALSO TAKING OTHER MEDICATIONS WHICH CONTAIN ACETAMINOPHEN)  For MODERATE PAIN RELIEF ALSO take ibuprofen (Advil, Motrin) 600 mg every 6 hours   Do this for up to five days  You can take the acetaminophen and ibuprofen simultaneously, they have added pain relieving effects when taken at the same time  Do not take acetaminophen or ibuprofen if you have a known allergy or adverse reaction to these medications or if your doctor has advised you not to take them  Do not take Ibuprofen if you are pregnant, or have a history of kidney disease or gastritis or gastrointestinal bleeding  8  PAIN/FEVER RELIEF FOR CHILDREN: For mild pain or fever, give acetaminophen (Tylenol) according to the package instructions for your child's age/weight  For moderate pain/fever in children OVER 6 months also give ibuprofen, according to the package instructions for your child's age/weight  Do this for up to five days  You can give the acetaminophen and ibuprofen simultaneously, they have added pain relieving effects when taken at the same time  Do not give acetaminophen or ibuprofen if your child has a known allergy or adverse reaction to these medications or if your doctor has advised you not to give them  When using these online guides, take care to [address] of the product you are using and match it to the table  9  Be aware that REPEAT READINGS are often done on X-rays, CT scans, ultrasounds and other medical images and that in a small percentage of cases abnormalities are detected on these second readings and you may be contacted if this occurs  10  If you have had a SPLINT applied: be aware that there is always a small chance of a fracture not showing or being missed on imaging  For this reason, do not use or bear weight on the affected limb for the next 5 days  If pain does not resolve completely, or if pain becomes worse or other symptoms develop, see your doctor or return to the ED   You may need repeat imaging or other care

## 2021-06-10 ENCOUNTER — APPOINTMENT (OUTPATIENT)
Dept: PHYSICAL THERAPY | Age: 31
End: 2021-06-10
Payer: COMMERCIAL

## 2021-06-14 ENCOUNTER — OFFICE VISIT (OUTPATIENT)
Dept: FAMILY MEDICINE CLINIC | Facility: CLINIC | Age: 31
End: 2021-06-14
Payer: COMMERCIAL

## 2021-06-14 VITALS
TEMPERATURE: 97.4 F | BODY MASS INDEX: 41.14 KG/M2 | HEIGHT: 63 IN | SYSTOLIC BLOOD PRESSURE: 120 MMHG | DIASTOLIC BLOOD PRESSURE: 60 MMHG | WEIGHT: 232.2 LBS

## 2021-06-14 DIAGNOSIS — M26.629 TMJ PAIN DYSFUNCTION SYNDROME: Primary | ICD-10-CM

## 2021-06-14 PROCEDURE — 99213 OFFICE O/P EST LOW 20 MIN: CPT | Performed by: FAMILY MEDICINE

## 2021-06-14 PROCEDURE — 3008F BODY MASS INDEX DOCD: CPT | Performed by: FAMILY MEDICINE

## 2021-06-14 PROCEDURE — 1036F TOBACCO NON-USER: CPT | Performed by: FAMILY MEDICINE

## 2021-06-14 RX ORDER — PREDNISONE 10 MG/1
TABLET ORAL
Qty: 63 TABLET | Refills: 0 | Status: SHIPPED | OUTPATIENT
Start: 2021-06-14 | End: 2021-07-16

## 2021-06-14 NOTE — PROGRESS NOTES
Assessment/Plan:  Patient will be referred to ENT  Patient will see 0MS in 6 weeks  Patient will be placed on steroid taper  Patient may continue with ice  Diagnoses and all orders for this visit:    TMJ pain dysfunction syndrome  -     predniSONE 10 mg tablet; 6 tablets daily for 3 days, 5 for 3 days, 4 for 3 days, 3 for 3 days, 2 for 3 days, 1 for 3 days  -     Ambulatory Referral to Otolaryngology; Future            Subjective:        Patient ID: Amauri Mohan is a 35 y o  female  Patient is here with left sided facial pain  Patient was seen emergency room  Patient does have appointment with dental specialists in 6 weeks  Patient using ibuprofen  Patient did have x-rays done with dentist   Patient did take amoxicillin  Patient also gets sharp ear pain on left  The following portions of the patient's history were reviewed and updated as appropriate: allergies, current medications, past family history, past medical history, past social history, past surgical history and problem list       Review of Systems   Constitutional: Negative  HENT: Positive for dental problem  Eyes: Negative  Respiratory: Negative  Cardiovascular: Negative  Gastrointestinal: Negative  Endocrine: Negative  Genitourinary: Negative  Musculoskeletal: Positive for arthralgias  Skin: Negative  Allergic/Immunologic: Negative  Neurological: Negative  Hematological: Negative  Psychiatric/Behavioral: Negative  Objective:               /60 (BP Location: Right arm, Patient Position: Sitting, Cuff Size: Large)   Temp (!) 97 4 °F (36 3 °C) (Tympanic)   Ht 5' 3" (1 6 m)   Wt 105 kg (232 lb 3 2 oz)   BMI 41 13 kg/m²          Physical Exam  Vitals and nursing note reviewed  Constitutional:       General: She is not in acute distress  Appearance: Normal appearance  She is not ill-appearing, toxic-appearing or diaphoretic     HENT:      Head: Normocephalic and atraumatic  Right Ear: Tympanic membrane, ear canal and external ear normal  There is no impacted cerumen  Left Ear: Tympanic membrane, ear canal and external ear normal  There is no impacted cerumen  Nose: Nose normal  No congestion or rhinorrhea  Mouth/Throat:      Mouth: Mucous membranes are moist       Pharynx: No oropharyngeal exudate or posterior oropharyngeal erythema  Eyes:      General: No scleral icterus  Right eye: No discharge  Left eye: No discharge  Extraocular Movements: Extraocular movements intact  Conjunctiva/sclera: Conjunctivae normal       Pupils: Pupils are equal, round, and reactive to light  Neck:      Vascular: No carotid bruit  Cardiovascular:      Rate and Rhythm: Normal rate and regular rhythm  Pulses: Normal pulses  Heart sounds: Normal heart sounds  No murmur heard  No friction rub  No gallop  Pulmonary:      Effort: Pulmonary effort is normal  No respiratory distress  Breath sounds: Normal breath sounds  No stridor  No wheezing, rhonchi or rales  Chest:      Chest wall: No tenderness  Abdominal:      General: Abdomen is flat  Bowel sounds are normal  There is no distension  Palpations: Abdomen is soft  Tenderness: There is no abdominal tenderness  There is no guarding or rebound  Musculoskeletal:         General: Tenderness present  No swelling, deformity or signs of injury  Cervical back: Normal range of motion and neck supple  No rigidity  No muscular tenderness  Right lower leg: No edema  Left lower leg: No edema  Comments: Left TMJ pain with palpation  Lymphadenopathy:      Cervical: No cervical adenopathy  Skin:     General: Skin is warm and dry  Capillary Refill: Capillary refill takes less than 2 seconds  Coloration: Skin is not jaundiced  Findings: No bruising, erythema, lesion or rash     Neurological:      Mental Status: She is alert and oriented to person, place, and time  Mental status is at baseline  Cranial Nerves: No cranial nerve deficit  Sensory: No sensory deficit  Motor: No weakness  Coordination: Coordination normal       Gait: Gait normal    Psychiatric:         Mood and Affect: Mood normal          Behavior: Behavior normal          Thought Content:  Thought content normal          Judgment: Judgment normal

## 2021-06-15 ENCOUNTER — OFFICE VISIT (OUTPATIENT)
Dept: PHYSICAL THERAPY | Age: 31
End: 2021-06-15
Payer: COMMERCIAL

## 2021-06-15 DIAGNOSIS — M25.50 ARTHRALGIA OF MULTIPLE SITES: Primary | ICD-10-CM

## 2021-06-15 PROCEDURE — 97113 AQUATIC THERAPY/EXERCISES: CPT | Performed by: PHYSICAL THERAPIST

## 2021-06-15 NOTE — PROGRESS NOTES
Daily Note     Today's date: 6/15/2021  Patient name: Cody Ziegler  : 1990  MRN: [id number]  Referring provider: Kori Dixon PA-C  Dx:   Encounter Diagnosis     ICD-10-CM    1  Arthralgia of multiple sites  M25 50                   Subjective: Hip soreness persist      Objective: See treatment diary below      Assessment: Tolerated treatment well  Patient would benefit from continued PT      Plan: Continue per plan of care  Precautions: fibromyalgia, chronic fatigue      Daily Treatment Diary     Manual  5/18 5/25 6/1 6/8 6/15                                                                             Exercise Diary                           Walking    x5 x5                                  Seated hamstring stretch x4 hold 20 sec  x4 x4 x4 x4        Seated piriformis stretch x4 hold 20 sec  x4 x4 x4 x4                     Standing hip flexion 2x10 2x10 2x10 2x10 2x10        Standing hip abd 2x10 2x10 2x10 2x10 2x10                     Standing knee flexion 2x15 2x15 2x15 2x15 2x15        Step ups 2x10 2x10 2x10 2x10 2x10                     Pony cycling x5 minutes x5 x5 x5 x5        Pony cross country skiing x5 minutes x5 x5 x5 x5        Heel raises/toe raises 2x15 2x15 2x15 2x15 2x15        Heel-to-toe balance x10 reps each x10 x10 x10 x10        Pony    Hip abd/add - 3x15 3x15        Standing quad stretch     x5 reps hold 30 sec  Modalities                                                                Manuals 5/13 5/21 5/28 6/3    Measurements IE LC - 10 minutes x10 minutes x10 minutes                            Neuro Re-Ed         SLS        Tandem on foam                                                Ther Ex        Supine piriformis stretch  x5 reps hold 20 sec   x5 reps x5 reps    SLR flexion  2x10 2x10 Standing today    SLR abd  2x10 2x10 Standing today            cybex leg press  3x10 - 40# 3x10 3x10    cybex knee flexion  3x10 -  3x10 3x10    SLS on blue foam x10 hold 5 sec each x5 x5    Bridging with hip abd   2x10 3x10    Cybex hip abd    2 plates - 2G19                                                                    Ther Activity                        Gait Training                        Modalities

## 2021-06-17 ENCOUNTER — OFFICE VISIT (OUTPATIENT)
Dept: PHYSICAL THERAPY | Age: 31
End: 2021-06-17
Payer: COMMERCIAL

## 2021-06-17 DIAGNOSIS — M25.50 ARTHRALGIA OF MULTIPLE SITES: Primary | ICD-10-CM

## 2021-06-17 PROCEDURE — 97110 THERAPEUTIC EXERCISES: CPT | Performed by: PHYSICAL THERAPIST

## 2021-06-17 NOTE — PROGRESS NOTES
Daily Note     Today's date: 2021  Patient name: Talon Lester  : 1990  MRN: [id number]  Referring provider: Usman Estrada PA-C  Dx:   Encounter Diagnosis     ICD-10-CM    1  Arthralgia of multiple sites  M25 50                   Subjective: Hip soreness persists      Objective: See treatment diary below      Assessment: Tolerated treatment well  Patient would benefit from continued PT      Plan: Continue per plan of care  Precautions: fibromyalgia, chronic fatigue      Daily Treatment Diary     Manual  5/18 5/25 6/1 6/8 6/15                                                                             Exercise Diary                           Walking    x5 x5                                  Seated hamstring stretch x4 hold 20 sec  x4 x4 x4 x4        Seated piriformis stretch x4 hold 20 sec  x4 x4 x4 x4                     Standing hip flexion 2x10 2x10 2x10 2x10 2x10        Standing hip abd 2x10 2x10 2x10 2x10 2x10                     Standing knee flexion 2x15 2x15 2x15 2x15 2x15        Step ups 2x10 2x10 2x10 2x10 2x10                     Pony cycling x5 minutes x5 x5 x5 x5        Pony cross country skiing x5 minutes x5 x5 x5 x5        Heel raises/toe raises 2x15 2x15 2x15 2x15 2x15        Heel-to-toe balance x10 reps each x10 x10 x10 x10        Pony    Hip abd/add - 3x15 3x15        Standing quad stretch     x5 reps hold 30 sec  Modalities                                                                Manuals 5/13 5/21 5/28 6/3 6/17   Measurements IE LC - 10 minutes x10 minutes x10 minutes x10 minutes                           Neuro Re-Ed         SLS        Tandem on foam                                                Ther Ex        Supine piriformis stretch  x5 reps hold 20 sec   x5 reps x5 reps x5   SLR flexion  2x10 2x10 Standing today 2x10   SLR abd  2x10 2x10 Standing today 2x10           cybex leg press  3x10 - 40# 3x10 3x10 3x10   cybex knee flexion  3x10 - 3x10 3x10 3x10   SLS on blue foam  x10 hold 5 sec each x5 x5 x5   Bridging with hip abd   2x10 3x10 3x10   Cybex hip abd    2 plates - 4Y81 5U12           Squats on 1/2 roll     2x10                                                   Ther Activity                        Gait Training                        Modalities

## 2021-06-18 ENCOUNTER — ANESTHESIA EVENT (OUTPATIENT)
Dept: PERIOP | Facility: HOSPITAL | Age: 31
End: 2021-06-18
Payer: COMMERCIAL

## 2021-06-18 NOTE — PRE-PROCEDURE INSTRUCTIONS
Pre-Surgery Instructions:    Pre Procedure instructions given and verbalized understanding  reviewed with pt all questions answered at this time awaiting phone call on 6/30 fot TOS    Have you had / have a sore throat?  have you had / have a cough less than 1 week? Have you had / have a fever greater than 100 0 - 100  4? Are you experiencing any shortness of breath? Medication Instructions    acetaminophen (TYLENOL) 500 mg tablet Instructed patient per Anesthesia Guidelines  takes if needed    Adalimumab (Humira Pen) 40 MG/0 4ML PNKT Instructed patient per Anesthesia Guidelines  will take as prescribed    ALPRAZolam (XANAX) 0 5 mg tablet Instructed patient per Anesthesia Guidelines  may take on DOS    ARIPiprazole (ABILIFY) 2 mg tablet Instructed patient per Anesthesia Guidelines  taking on 6/30    buPROPion (WELLBUTRIN XL) 150 mg 24 hr tablet Instructed patient per Anesthesia Guidelines  taking on 6/30    escitalopram (LEXAPRO) 10 mg tablet Instructed patient per Anesthesia Guidelines   phentermine (ADIPEX-P) 37 5 MG tablet Instructed patient per Anesthesia Guidelines   predniSONE 10 mg tablet Instructed patient per Anesthesia Guidelines   pregabalin (LYRICA) 25 mg capsule Instructed patient per Anesthesia Guidelines   vitamin B-12 (VITAMIN B-12) 1,000 mcg tablet Instructed patient per Anesthesia Guidelines

## 2021-06-22 ENCOUNTER — APPOINTMENT (OUTPATIENT)
Dept: PHYSICAL THERAPY | Age: 31
End: 2021-06-22
Payer: COMMERCIAL

## 2021-06-24 ENCOUNTER — OFFICE VISIT (OUTPATIENT)
Dept: PHYSICAL THERAPY | Age: 31
End: 2021-06-24
Payer: COMMERCIAL

## 2021-06-24 DIAGNOSIS — M25.50 ARTHRALGIA OF MULTIPLE SITES: Primary | ICD-10-CM

## 2021-06-24 PROCEDURE — 97110 THERAPEUTIC EXERCISES: CPT | Performed by: PHYSICAL THERAPIST

## 2021-06-24 NOTE — PROGRESS NOTES
Daily Note     Today's date: 2021  Patient name: Aliyah Reid  : 1990  MRN: [id number]  Referring provider: Rodrigo Leija PA-C  Dx: No diagnosis found  Subjective: Patient continues to in "flare up" state      Objective: See treatment diary below      Assessment: Tolerated treatment well  Patient would benefit from continued PT      Plan: Continue per plan of care  Precautions: fibromyalgia, chronic fatigue      Daily Treatment Diary     Manual  5/18 5/25 6/1 6/8 6/15                                                                             Exercise Diary                           Walking    x5 x5                                  Seated hamstring stretch x4 hold 20 sec  x4 x4 x4 x4        Seated piriformis stretch x4 hold 20 sec  x4 x4 x4 x4                     Standing hip flexion 2x10 2x10 2x10 2x10 2x10        Standing hip abd 2x10 2x10 2x10 2x10 2x10                     Standing knee flexion 2x15 2x15 2x15 2x15 2x15        Step ups 2x10 2x10 2x10 2x10 2x10                     Pony cycling x5 minutes x5 x5 x5 x5        Pony cross country skiing x5 minutes x5 x5 x5 x5        Heel raises/toe raises 2x15 2x15 2x15 2x15 2x15        Heel-to-toe balance x10 reps each x10 x10 x10 x10        Pony    Hip abd/add - 3x15 3x15        Standing quad stretch     x5 reps hold 30 sec  Modalities                                                                Manuals 6/24 5/21 5/28 6/3 6/17   LC x10 minutes LC - 10 minutes x10 minutes x10 minutes x10 minutes                           Neuro Re-Ed         SLS        Tandem on foam                                                Ther Ex        Supine piriformis stretch x5 x5 reps hold 20 sec   x5 reps x5 reps x5   SLR flexion 2x10 2x10 2x10 Standing today 2x10   SLR abd 2x10 2x10 2x10 Standing today 2x10           cybex leg press 40# 3x10 - 40# 3x10 3x10 3x10   cybex knee flexion 30# 3x10 -  3x10 3x10 3x10   SLS on blue foam X10 x10 hold 5 sec each x5 x5 x5   Bridging with hip abd 3X10  2x10 3x10 3x10   Cybex hip abd 3 plates - 0V25   2 plates - 6B33 2E86           Squats on 1/2 roll nt    2x10                                                   Ther Activity                        Gait Training                        Modalities

## 2021-06-25 ENCOUNTER — SOCIAL WORK (OUTPATIENT)
Dept: BEHAVIORAL/MENTAL HEALTH CLINIC | Facility: CLINIC | Age: 31
End: 2021-06-25
Payer: COMMERCIAL

## 2021-06-25 DIAGNOSIS — F41.1 GENERALIZED ANXIETY DISORDER: Chronic | ICD-10-CM

## 2021-06-25 DIAGNOSIS — F33.1 DEPRESSION, MAJOR, RECURRENT, MODERATE (HCC): Primary | Chronic | ICD-10-CM

## 2021-06-25 PROCEDURE — 90834 PSYTX W PT 45 MINUTES: CPT | Performed by: SOCIAL WORKER

## 2021-06-25 NOTE — BH TREATMENT PLAN
Justine Reading  1990         Date of Initial Treatment Plan: June 26, 2019   Date of Current Treatment Plan: June 25, 2021     Treatment Plan Number 6     Strengths/Personal Resources for Self Care: "I'm helpful to other people in supporting them "; good relationship with best friend (Eulalio Bee) and her daughters; positive family support; good insight into opportunities for change       Diagnosis:   1  Depression, major, recurrent, moderate ([address] )      2  Generalized anxiety disorder            Area of Needs: Anxiety and depression; physical health issues        Long Term Goal 1: "I want to be more outgoing and not have so much anxiety when I talk to people  I want to be happier, and I want to learn how to identify when I'm going down in my moods  I want to notice it earlier "     Target Date:           October 23, 2021                                        Treatment Plan Expiration Date:          December 22, 2021      Completion Date: To be determined         Short Term Objectives for Goal 1:             0  Cruz will identify triggers and prompting events that increase symptoms of anxiety and depression    Update June 25, 2021: Mat Curran continues to exhibit the ability to identify some triggers/prompting events that impact mood regulation  She currently identifies "the pressure I put on myself", physical health issues, relationship issues as primary factors in her mood regulation  We will continue to explore triggers and prompting events, as we move forward, so we can continue to develop strategies for mood management             2  Cruz will learn and exhibit understanding of a minimum of three distress tolerance skills and emotion regulation skills to assist with symptom reduction  Update June 25, 2021: Mat Curran states that she is using some mindfulness-based strategies (e g , meditating while observing her lava lamp), and spending time with her cat   She states that she is interested in 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  "I think that will help my mental health " She would like to aim for returning to the shelter as soon as possible  We will review this objective at the next treatment plan update               5  Cruz will increase her daily physical activity to a duration of 15-30 minutes (including walking, swimming, stretching, etc )    Update June 25, 2021: Janineafia Joshua states that she is doing aqua- and physical-therapy 2x per week  She states that it is helping her to decrease her pain level  She is using her own pool on a daily basis for increasing movement (usually 1/2 hour to one hour)  She would like to increase her exercise routine to include walking (at least one block) a minimum of 3x per week  We will review this objective at the next treatment plan update              3  Cruz will increase her social interactions (outside of her home) to a minimum of 3x per week    Update June 25, 2021: Anurag Lewis states that she wants to continue to work on increasing her socialization time with her current friends  She states that she eventually wants to increase her support network; however, she feels that she is not yet ready for this               5  Shukri Landrum will learn and utilize formal mindfulness-based strategies a minimum of 10 minutes every day    Update June 25, 2021: Anurag Lewis states that she has been meditating, with the use of her lava-lamp for about 15 minutes (maximum), two or three times per week  She has identified that, when she is in pain, she forgets to do it  She will work on increasing the frequency to 4-5 times per week  We will review this object at the next treatment plan update               6  Cruz will maintain a level of anxiety that does not surpass a 4/10 on most days  Incidents that surpass this limit will be process in therapy sessions      Update June 25, 2021: Anurag Lewis states that her anxiety is approximately a 5/10  She states that her depression is approximately a 7/10  We will revise this objective to utilize the PHQ-9 and BESSY-7  The objective will be to maintain an anxiety and depression level that is rated "mild" on these tools               7  Stephanie will maintain adherence with medication management sessions and her medication regimen  Update June 25, 2021: Anthony Bynum  states that she believes she has missed approximately two doses of medications within the past two weeks  She states, "I was in pain, and I didn't want to get up " She states that missing an occasional dose does not have much of an effect  She acknowledges that missing consecutive doses does impact her mood regulation  She has not been using medicinal marijuana ("in months")  8  Anthony Bynum will follow through with the completion of neuropsych testing (if/when approved by her insurance carrier)  Update June 25, 2021: Anthony Bynum states that she has an appointment scheduled for September 13th  We will follow up with this objective after the completion of the testing          GOAL 1: Modality: Individual 1-2x per month   Completion Date to be determined, Medication Management and The person(s) responsible for carrying out the plan is  Stephanie (client); Adina Porras (clinician); Dr Hernando Martinez (psychiatrist)     Clinician will use client-centered therapy, mindfulness-based strategies, DBT-informed skills and solution-focused therapy to address Cruz's symptoms of anxiety  Gregbruna will practice skills between sessions and will report back, during subsequent sessions regarding successes and barriers          Behavioral Health Treatment Plan [facility]: Diagnosis and Treatment Plan explained to Gregparkdonavan Cruz relates understanding diagnosis and is agreeable to Treatment Plan          Client Comments : Please share your thoughts, feelings, need and/or experiences regarding your treatment plan: "I like my treatment plan   I'm comfortable with it  I don't feel like it's not doable "

## 2021-06-25 NOTE — PSYCH
Psychotherapy Provided: Individual Psychotherapy 50 minutes     Length of time in session: 50 minutes, follow up in 2 weeks    Encounter Diagnosis     ICD-10-CM    1  Depression, major, recurrent, moderate ([facility] [address] )  F33 1    2  Generalized anxiety disorder  F41 1        Goals addressed in session: Goal 1     Pain:      moderate to severe-- physical health issues    Current suicide risk : Low     DATA: Met with Stephanie for scheduled individual session  Topics of discussion included family stressors, relationships with family and physical health concerns  Reuben Lewis discussed her health-related issues and the impact on her weight and her overall mood  She states that she will be going to weight-management to assist her with non-surgical weight-loss  She has seen a dietician in the past; however, she has not seen anyone from Weight Management  Reuben Lewis states that, at this point, she is not eating a healthy balance of foods  She endorses restricting her food intake (not intentionally)  She states, "I just don't care for anything " She endorses depression, without any suicidal thoughts  She states, "If I would try to harm myself, it would just hurt me more  I don't want to hurt more than I already do " Stephanie reports a significant increase in her pain  We discussed behavior change and what motivates her to change  She states, "I don't really know  It just seems to click " Reuben Lewis states that she wants to make change, but she lacks the motivation to increase her exercise and healthy eating on her own  We spent some time reviewing and revising Stephanie's treatment plan  She states that she wants to continue to work on mood regulation and increasing her ability to get back to work (both physically and emotionally)  Client shows evidence of utilizing Mindfulness-based strategies skills to manage mental health symptoms   During this session, this clinician used the following therapeutic modalities: supportive psychotherapy, client-centered therapy, mindfulness-based strategies, DBT-informed skills, Motivational Interviewing and solution-focused therapy  ASSESSMENT: Niall Connor presents with a depressed mood  Her affect is normal range and intensity, appropriate  Niall Connor exhibits strong therapeutic rapport with this clinician  Niall Connor continues to exhibit willingness to work on treatment goals and objectives  Niall Connor presents with a minimal risk of suicide, minimal risk of self-harm, and minimal risk of harm to others  PLAN: Niall Connor will return in two weeks for the next scheduled session  Between sessions, Niall Connor will continue to focus on her physical wellness and will report back during the next session re: successes and barriers  At the next session, this clinician will use supportive psychotherapy, client-centered therapy, mindfulness-based strategies, DBT-informed skills, Motivational Interviewing and solution-focused therapy to address her mood regulation and relationship concerns, in an effort to assist Niall Connor with meeting treatment goals  Behavioral Health Treatment Plan [facility]: Diagnosis and Treatment Plan explained to Em Luannein relates understanding diagnosis and is agreeable to Treatment Plan   Yes

## 2021-06-28 ENCOUNTER — HOSPITAL ENCOUNTER (EMERGENCY)
Facility: HOSPITAL | Age: 31
Discharge: HOME | End: 2021-06-28
Attending: INTERNAL MEDICINE
Payer: COMMERCIAL

## 2021-06-28 VITALS
BODY MASS INDEX: 41.1 KG/M2 | WEIGHT: 232 LBS | SYSTOLIC BLOOD PRESSURE: 148 MMHG | DIASTOLIC BLOOD PRESSURE: 90 MMHG | HEART RATE: 100 BPM | TEMPERATURE: 98.7 F | RESPIRATION RATE: 16 BRPM | OXYGEN SATURATION: 94 %

## 2021-06-28 DIAGNOSIS — G43.809 OTHER MIGRAINE WITHOUT STATUS MIGRAINOSUS, NOT INTRACTABLE: Primary | ICD-10-CM

## 2021-06-28 PROCEDURE — 96374 THER/PROPH/DIAG INJ IV PUSH: CPT

## 2021-06-28 PROCEDURE — 99282 EMERGENCY DEPT VISIT SF MDM: CPT | Performed by: INTERNAL MEDICINE

## 2021-06-28 PROCEDURE — 96361 HYDRATE IV INFUSION ADD-ON: CPT

## 2021-06-28 PROCEDURE — 96375 TX/PRO/DX INJ NEW DRUG ADDON: CPT

## 2021-06-28 PROCEDURE — 99283 EMERGENCY DEPT VISIT LOW MDM: CPT

## 2021-06-28 RX ORDER — ACETAMINOPHEN 325 MG/1
650 TABLET ORAL ONCE
Status: COMPLETED | OUTPATIENT
Start: 2021-06-28 | End: 2021-06-28

## 2021-06-28 RX ORDER — METOCLOPRAMIDE HYDROCHLORIDE 5 MG/ML
10 INJECTION INTRAMUSCULAR; INTRAVENOUS ONCE
Status: COMPLETED | OUTPATIENT
Start: 2021-06-28 | End: 2021-06-28

## 2021-06-28 RX ORDER — SODIUM CHLORIDE 9 MG/ML
500 INJECTION, SOLUTION INTRAVENOUS CONTINUOUS
Status: DISCONTINUED | OUTPATIENT
Start: 2021-06-28 | End: 2021-06-28 | Stop reason: HOSPADM

## 2021-06-28 RX ORDER — KETOROLAC TROMETHAMINE 30 MG/ML
15 INJECTION, SOLUTION INTRAMUSCULAR; INTRAVENOUS ONCE
Status: COMPLETED | OUTPATIENT
Start: 2021-06-28 | End: 2021-06-28

## 2021-06-28 RX ADMIN — ACETAMINOPHEN 650 MG: 325 TABLET ORAL at 20:02

## 2021-06-28 RX ADMIN — SODIUM CHLORIDE 500 ML/HR: 0.9 INJECTION, SOLUTION INTRAVENOUS at 20:01

## 2021-06-28 RX ADMIN — KETOROLAC TROMETHAMINE 15 MG: 30 INJECTION, SOLUTION INTRAMUSCULAR; INTRAVENOUS at 20:02

## 2021-06-28 RX ADMIN — METOCLOPRAMIDE 10 MG: 5 INJECTION, SOLUTION INTRAMUSCULAR; INTRAVENOUS at 20:02

## 2021-06-28 NOTE — ED PROVIDER NOTES
History  Chief Complaint   Patient presents with    Migraine     pt presents with a migraine x2 days  pt reports her Rx migraine medication has not been helping     27-year-old female presents emergency room complaining of a migraine  Patient is light sensitive migraines so she would nauseous had no vomiting or photophobia  Patient states this is my typical migraine it usually resolves when I take Toradol at home however did not work at this time  She denies any trauma injury  Prior to Admission Medications   Prescriptions Last Dose Informant Patient Reported? Taking? ALPRAZolam (XANAX) 0 5 mg tablet  Self No No   Sig: Take 1 tablet (0 5 mg total) by mouth daily as needed for anxiety   ARIPiprazole (ABILIFY) 2 mg tablet  Self No No   Sig: Take 1 tablet (2 mg total) by mouth daily   Adalimumab (Humira Pen) 40 MG/0 4ML PNKT  Self Yes No   Sig: Inject 0 4 mL (40 mg total) under the skin every 14 (fourteen) days  Aimovig 140 MG/ML SOAJ  Self No No   Sig: Inject 140mg under the skin every 30 (thirty) days  B-D 3CC LUER-DEANN SYR 25GX1" 25G X 1" 3 ML MISC  Self Yes No   Cholecalciferol (VITAMIN D3) 69320 units CAPS  Self Yes No   Sig: Take by mouth once a week   Syringe/Needle, Disp, (SYRINGE 3CC/96DM7-2/4") 27G X 1-1/4" 3 ML MISC  Self No No   Sig: Use for IM injection of ketorolac    acetaminophen (TYLENOL) 500 mg tablet  Self No No   Sig: Take 2 tablets (1,000 mg total) by mouth every 6 (six) hours as needed for mild pain or moderate pain   buPROPion (WELLBUTRIN XL) 150 mg 24 hr tablet  Self No No   Sig: Take 1 tablet (150 mg total) by mouth daily   dexamethasone (DECADRON) 2 mg tablet  Self No No   Si tab qam with food prn migraine  escitalopram (LEXAPRO) 10 mg tablet  Self No No   Sig: Take 1 tablet (10 mg total) by mouth daily   etonogestrel-ethinyl estradiol (NuvaRing) 0 12-0 015 MG/24HR vaginal ring  Self No No   Sig: Insert ring for 21 days then remove for one week     furosemide (LASIX) 20 mg tablet  Self No No   Sig: Take 1 tablet (20 mg total) by mouth daily as needed (Lower extremity edema)   ketorolac (TORADOL) 10 mg tablet  Self No No   Sig: Take 1 tablet (10 mg total) by mouth every 6 (six) hours as needed (migraine) Max 2-3 per week    lidocaine (LIDODERM) 5 %  Self No No   Sig: Apply 1 patch topically daily Remove & Discard patch within 12 hours or as directed by MD   Patient not taking: Reported on 2021   melatonin 1 mg  Self No No   Si-2 tabs qhs prn insomnia   methocarbamol (ROBAXIN) 500 mg tablet  Self No No   Sig: Take 1 tablet (500 mg total) by mouth 3 (three) times a day as needed for muscle spasms   Patient not taking: Reported on 2021   metoclopramide (REGLAN) 10 mg tablet  Self No No   Sig: Take 1 tablet (10 mg total) by mouth 3 (three) times a day as needed (headache or nausea)   naproxen (NAPROSYN) 500 mg tablet  Self No No   Sig: Take 1 tablet (500 mg total) by mouth 2 (two) times a day with meals for 7 days   onabotulinumtoxin A (BOTOX) 100 units  Self Yes No   Sig: Inject as directed   phentermine (ADIPEX-P) 37 5 MG tablet  Self No No   Sig: Take 1 tablet (37 5 mg total) by mouth daily   predniSONE 10 mg tablet   No No   Si tablets daily for 3 days, 5 for 3 days, 4 for 3 days, 3 for 3 days, 2 for 3 days, 1 for 3 days   pregabalin (LYRICA) 25 mg capsule  Self No No   Sig: Take 1 capsule (25 mg total) by mouth 2 (two) times a day   vitamin B-12 (VITAMIN B-12) 1,000 mcg tablet  Self Yes No   Sig: Take 1,000 mcg by mouth daily      Facility-Administered Medications Last Administration Doses Remaining   cyanocobalamin injection 1,000 mcg 8/3/2020  8:59 AM    cyanocobalamin injection 1,000 mcg 2020 11:46 AM    cyanocobalamin injection 1,000 mcg 2020 11:29 AM           Past Medical History:   Diagnosis Date    Abnormal Pap smear of cervix     Anxiety     Arthritis     Depression     Fibromyalgia, primary     Migraine     Vitamin B12 deficiency Past Surgical History:   Procedure Laterality Date    COLONOSCOPY N/A 5/8/2018    Procedure: COLONOSCOPY;  Surgeon: Skye Grace MD;  Location[facility]; Service: Gastroenterology    TONSILLECTOMY      WISDOM TOOTH EXTRACTION         Family History   Problem Relation Age of Onset    Rheum arthritis Mother     Psoriasis Mother     Other Mother     Hypertension Mother     Diabetes unspecified Mother     Sjogren's syndrome Mother     Alcohol abuse Mother     Drug abuse Mother     Anxiety disorder Father     Alcohol abuse Father     Cancer Other     Diabetes Other     Other Other         High blood pressure    Depression Maternal Grandmother      I have reviewed and agree with the history as documented  E-Cigarette/Vaping    E-Cigarette Use Former User     Start Date 7/1/19     Comments medical marijuana       E-Cigarette/Vaping Substances    Nicotine No     THC Yes     CBD No     Flavoring No     Other No     Unknown No      Social History     Tobacco Use    Smoking status: Never Smoker    Smokeless tobacco: Never Used   Vaping Use    Vaping Use: Former    Start date: 7/1/2019    Substances: THC   Substance Use Topics    Alcohol use: No    Drug use: Yes     Types: Marijuana     Comment: Medical marijuana not using        Review of Systems   Constitutional: Negative  HENT: Negative  Respiratory: Negative  Cardiovascular: Negative  Gastrointestinal: Negative  Genitourinary: Negative  Skin: Negative  Neurological: Positive for headaches  Psychiatric/Behavioral: Positive for agitation  Physical Exam  Physical Exam  Vitals and nursing note reviewed  Constitutional:       General: She is not in acute distress  Appearance: Normal appearance  She is not ill-appearing  HENT:      Head: Normocephalic and atraumatic  Eyes:      Extraocular Movements: Extraocular movements intact        Conjunctiva/sclera: Conjunctivae normal       Pupils: Pupils are equal, round, and reactive to light  Cardiovascular:      Rate and Rhythm: Normal rate and regular rhythm  Pulmonary:      Effort: Pulmonary effort is normal       Breath sounds: Normal breath sounds  Musculoskeletal:      Cervical back: Normal range of motion and neck supple  Neurological:      General: No focal deficit present  Mental Status: She is alert and oriented to person, place, and time  Mental status is at baseline  Cranial Nerves: No cranial nerve deficit  Sensory: No sensory deficit  Motor: No weakness  Psychiatric:         Mood and Affect: Mood normal          Behavior: Behavior normal          Thought Content: Thought content normal          Judgment: Judgment normal          Vital Signs  ED Triage Vitals [06/28/21 1923]   Temperature Pulse Respirations Blood Pressure SpO2   98 7 °F (37 1 °C) 100 16 148/90 94 %      Temp Source Heart Rate Source Patient Position - Orthostatic VS BP Location FiO2 (%)   Tympanic Monitor -- Left arm --      Pain Score       7           Vitals:    06/28/21 1923   BP: 148/90   Pulse: 100         Visual Acuity      ED Medications  Medications   sodium chloride 0 9 % infusion (0 mL/hr Intravenous Stopped 6/28/21 2059)   ketorolac (TORADOL) injection 15 mg (15 mg Intravenous Given 6/28/21 2002)   metoclopramide (REGLAN) injection 10 mg (10 mg Intravenous Given 6/28/21 2002)   acetaminophen (TYLENOL) tablet 650 mg (650 mg Oral Given 6/28/21 2002)       Diagnostic Studies  Results Reviewed     None                 No orders to display              Procedures  Procedures         ED Course                             SBIRT 20yo+      Most Recent Value   SBIRT (22 yo +)   In order to provide better care to our patients, we are screening all of our patients for alcohol and drug use  Would it be okay to ask you these screening questions? Yes Filed at: 06/28/2021 1954   Initial Alcohol Screen: US AUDIT-C    1   How often do you have a drink containing alcohol?  0 Filed at: 06/28/2021 1954   2  How many drinks containing alcohol do you have on a typical day you are drinking? 0 Filed at: 06/28/2021 1954   3a  Male UNDER 65: How often do you have five or more drinks on one occasion? 0 Filed at: 06/28/2021 1954   3b  FEMALE Any Age, or MALE 65+: How often do you have 4 or more drinks on one occassion? 0 Filed at: 06/28/2021 1954   Audit-C Score  0 Filed at: 06/28/2021 1954   BRUCE: How many times in the past year have you    Used an illegal drug or used a prescription medication for non-medical reasons? Never Filed at: 06/28/2021 1954                    MDM  Number of Diagnoses or Management Options  Diagnosis management comments: Patient presents with migraine symptoms she states this is her typical migraine  Recommend following up with her PCP if the migraines progress  Disposition  Final diagnoses:   Other migraine without status migrainosus, not intractable     Time reflects when diagnosis was documented in both MDM as applicable and the Disposition within this note     Time User Action Codes Description Comment    6/28/2021  9:05 PM Osman Lai Add [G43 809] Other migraine without status migrainosus, not intractable       ED Disposition     ED Disposition Condition Date/Time Comment    Discharge Stable Mon Jun 28, 2021  9:05 PM Violette Carlson discharge to home/self care  Follow-up Information     Follow up With Specialties Details Why Contact Info    Olga Bernardo DO Family Medicine In 3 days  [facility] [place] [phone] [id number]  [phone]            Patient's Medications   Discharge Prescriptions    No medications on file     No discharge procedures on file      PDMP Review       Value Time User    PDMP Reviewed  Yes 3/17/2021 11:20 AM Olga Bernardo DO          ED Provider  Electronically Signed by           Sunitha Sims MD  06/28/21 2100

## 2021-06-29 ENCOUNTER — OFFICE VISIT (OUTPATIENT)
Dept: PHYSICAL THERAPY | Age: 31
End: 2021-06-29
Payer: COMMERCIAL

## 2021-06-29 DIAGNOSIS — M25.50 ARTHRALGIA OF MULTIPLE SITES: Primary | ICD-10-CM

## 2021-06-29 PROCEDURE — 97113 AQUATIC THERAPY/EXERCISES: CPT | Performed by: PHYSICAL THERAPIST

## 2021-06-29 NOTE — PROGRESS NOTES
Daily Note     Today's date: 2021  Patient name: Jan Mcdonald  : 1990  MRN: [id number]  Referring provider: Tonya Winston PA-C  Dx:   Encounter Diagnosis     ICD-10-CM    1  Arthralgia of multiple sites  M25 50                   Subjective: Patient feeling better today - no real explanation for good days and bad days  Objective: See treatment diary below      Assessment: Tolerated treatment well  Patient would benefit from continued PT      Plan: Continue per plan of care  Precautions: fibromyalgia, chronic fatigue      Daily Treatment Diary     Manual  5/18 5/25 6/1 6/8 6/15 6/29                                                                            Exercise Diary                           Walking    x5 x5 x5                                 Seated hamstring stretch x4 hold 20 sec  x4 x4 x4 x4 x4       Seated piriformis stretch x4 hold 20 sec  x4 x4 x4 x4 x4                    Standing hip flexion 2x10 2x10 2x10 2x10 2x10 2x12       Standing hip abd 2x10 2x10 2x10 2x10 2x10 2x12                    Standing knee flexion 2x15 2x15 2x15 2x15 2x15 2x15       Step ups 2x10 2x10 2x10 2x10 2x10 2x12 - each                    Pony cycling x5 minutes x5 x5 x5 x5 x5       Pony cross country skiing x5 minutes x5 x5 x5 x5 x5       Heel raises/toe raises 2x15 2x15 2x15 2x15 2x15 2x15       Heel-to-toe balance x10 reps each x10 x10 x10 x10 x10       Pony    Hip abd/add - 3x15 3x15 3x15       Standing quad stretch     x5 reps hold 30 sec  x5                        Modalities                                                                Manuals 6/24 5/21 5/28 6/3 6/17   LC x10 minutes LC - 10 minutes x10 minutes x10 minutes x10 minutes                           Neuro Re-Ed         SLS        Tandem on foam                                                Ther Ex        Supine piriformis stretch x5 x5 reps hold 20 sec   x5 reps x5 reps x5   SLR flexion 2x10 2x10 2x10 Standing today 2x10   SLR abd 2x10 2x10 2x10 Standing today 2x10           cybex leg press 40# 3x10 - 40# 3x10 3x10 3x10   cybex knee flexion 30# 3x10 -  3x10 3x10 3x10   SLS on blue foam X10 x10 hold 5 sec each x5 x5 x5   Bridging with hip abd 3X10  2x10 3x10 3x10   Cybex hip abd 3 plates - 6W09   2 plates - 8X53 4L04           Squats on 1/2 roll nt    2x10                                                   Ther Activity                        Gait Training                        Modalities

## 2021-06-30 NOTE — ANESTHESIA PREPROCEDURE EVALUATION
Procedure:  EXCISION OF PILAR SCALP CYST X 2 AND RIGHT INNER GROIN NEVVUS (N/A Head)  CLOSURE WOUND SCALP X 2 AND RIGHT INNER GROIN (N/A Head)    Relevant Problems   CARDIO   (+) Anterior chest wall pain   (+) Chronic migraine without aura without status migrainosus, not intractable   (+) Hyperlipidemia   (+) Intractable migraine with aura without status migrainosus      MUSCULOSKELETAL   (+) Chronic low back pain   (+) Fibromyalgia syndrome      NEURO/PSYCH   (+) Depression, major, recurrent, moderate (HCC)   (+) Fibromyalgia syndrome   (+) Generalized anxiety disorder   (+) Other complicated headache syndrome   (+) Paresthesias      PULMONARY   (+) Pharyngitis due to Streptococcus species   (+) URI (upper respiratory infection)   (+) Upper respiratory infection      Other   (+) Cervical radiculitis   (+) Chronic fatigue   (+) Cognitive decline   (+) Dental infection   (+) Elevated blood pressure reading   (+) Encephalopathy   (+) Lumbar radiculopathy   (+) Possible pregnancy   (+) Snoring   (+) TMJ pain dysfunction syndrome        Physical Exam    Airway    Mallampati score: II  TM Distance: <3 FB  Neck ROM: full     Dental   No notable dental hx     Cardiovascular  Cardiovascular exam normal    Pulmonary  Pulmonary exam normal     Other Findings        Anesthesia Plan  ASA Score- 3     Anesthesia Type- general with ASA Monitors  Additional Monitors:   Airway Plan: LMA  Plan Factors-Exercise tolerance (METS): >4 METS  Chart reviewed  Existing labs reviewed  Patient is not a current smoker  Patient not instructed to abstain from smoking on day of procedure  Patient did not smoke on day of surgery  Induction- intravenous  Postoperative Plan-     Informed Consent- Anesthetic plan and risks discussed with patient  I personally reviewed this patient with the CRNA  Discussed and agreed on the Anesthesia Plan with the CRNA  Gardenia Eisenberg

## 2021-07-01 ENCOUNTER — ANESTHESIA (OUTPATIENT)
Dept: PERIOP | Facility: HOSPITAL | Age: 31
End: 2021-07-01
Payer: COMMERCIAL

## 2021-07-01 ENCOUNTER — HOSPITAL ENCOUNTER (OUTPATIENT)
Facility: HOSPITAL | Age: 31
Setting detail: OUTPATIENT SURGERY
Discharge: HOME | End: 2021-07-01
Attending: STUDENT IN AN ORGANIZED HEALTH CARE EDUCATION/TRAINING PROGRAM | Admitting: STUDENT IN AN ORGANIZED HEALTH CARE EDUCATION/TRAINING PROGRAM
Payer: COMMERCIAL

## 2021-07-01 VITALS
HEIGHT: 63 IN | SYSTOLIC BLOOD PRESSURE: 139 MMHG | TEMPERATURE: 99.3 F | OXYGEN SATURATION: 95 % | WEIGHT: 230 LBS | HEART RATE: 95 BPM | DIASTOLIC BLOOD PRESSURE: 86 MMHG | RESPIRATION RATE: 25 BRPM | BODY MASS INDEX: 40.75 KG/M2

## 2021-07-01 DIAGNOSIS — L72.11 PILAR CYSTS: ICD-10-CM

## 2021-07-01 DIAGNOSIS — D23.9 DYSPLASTIC NEVI: Primary | ICD-10-CM

## 2021-07-01 LAB
EXT PREGNANCY TEST URINE: NEGATIVE
EXT. CONTROL: NORMAL

## 2021-07-01 PROCEDURE — 88304 TISSUE EXAM BY PATHOLOGIST: CPT | Performed by: PATHOLOGY

## 2021-07-01 PROCEDURE — 11403 EXC TR-EXT B9+MARG 2.1-3CM: CPT | Performed by: STUDENT IN AN ORGANIZED HEALTH CARE EDUCATION/TRAINING PROGRAM

## 2021-07-01 PROCEDURE — 99024 POSTOP FOLLOW-UP VISIT: CPT | Performed by: STUDENT IN AN ORGANIZED HEALTH CARE EDUCATION/TRAINING PROGRAM

## 2021-07-01 PROCEDURE — 88305 TISSUE EXAM BY PATHOLOGIST: CPT | Performed by: PATHOLOGY

## 2021-07-01 PROCEDURE — 13100 CMPLX RPR TRUNK 1.1-2.5 CM: CPT | Performed by: STUDENT IN AN ORGANIZED HEALTH CARE EDUCATION/TRAINING PROGRAM

## 2021-07-01 PROCEDURE — 13120 CMPLX RPR S/A/L 1.1-2.5 CM: CPT | Performed by: STUDENT IN AN ORGANIZED HEALTH CARE EDUCATION/TRAINING PROGRAM

## 2021-07-01 PROCEDURE — 11422 EXC H-F-NK-SP B9+MARG 1.1-2: CPT | Performed by: STUDENT IN AN ORGANIZED HEALTH CARE EDUCATION/TRAINING PROGRAM

## 2021-07-01 PROCEDURE — 12031 INTMD RPR S/A/T/EXT 2.5 CM/<: CPT | Performed by: STUDENT IN AN ORGANIZED HEALTH CARE EDUCATION/TRAINING PROGRAM

## 2021-07-01 PROCEDURE — 81025 URINE PREGNANCY TEST: CPT | Performed by: STUDENT IN AN ORGANIZED HEALTH CARE EDUCATION/TRAINING PROGRAM

## 2021-07-01 RX ORDER — ALBUTEROL SULFATE 2.5 MG/3ML
2.5 SOLUTION RESPIRATORY (INHALATION) ONCE AS NEEDED
Status: DISCONTINUED | OUTPATIENT
Start: 2021-07-01 | End: 2021-07-01 | Stop reason: HOSPADM

## 2021-07-01 RX ORDER — FENTANYL CITRATE/PF 50 MCG/ML
25 SYRINGE (ML) INJECTION
Status: DISCONTINUED | OUTPATIENT
Start: 2021-07-01 | End: 2021-07-01 | Stop reason: HOSPADM

## 2021-07-01 RX ORDER — GINSENG 100 MG
CAPSULE ORAL AS NEEDED
Status: DISCONTINUED | OUTPATIENT
Start: 2021-07-01 | End: 2021-07-01 | Stop reason: HOSPADM

## 2021-07-01 RX ORDER — DIPHENHYDRAMINE HYDROCHLORIDE 50 MG/ML
12.5 INJECTION INTRAMUSCULAR; INTRAVENOUS ONCE AS NEEDED
Status: DISCONTINUED | OUTPATIENT
Start: 2021-07-01 | End: 2021-07-01 | Stop reason: HOSPADM

## 2021-07-01 RX ORDER — DEXAMETHASONE SODIUM PHOSPHATE 10 MG/ML
INJECTION, SOLUTION INTRAMUSCULAR; INTRAVENOUS AS NEEDED
Status: DISCONTINUED | OUTPATIENT
Start: 2021-07-01 | End: 2021-07-01

## 2021-07-01 RX ORDER — LIDOCAINE HYDROCHLORIDE 10 MG/ML
INJECTION, SOLUTION EPIDURAL; INFILTRATION; INTRACAUDAL; PERINEURAL AS NEEDED
Status: DISCONTINUED | OUTPATIENT
Start: 2021-07-01 | End: 2021-07-01

## 2021-07-01 RX ORDER — ONDANSETRON 4 MG/1
4 TABLET, FILM COATED ORAL EVERY 8 HOURS PRN
Qty: 20 TABLET | Refills: 0 | Status: SHIPPED | OUTPATIENT
Start: 2021-07-01 | End: 2021-09-03

## 2021-07-01 RX ORDER — MIDAZOLAM HYDROCHLORIDE 2 MG/2ML
INJECTION, SOLUTION INTRAMUSCULAR; INTRAVENOUS AS NEEDED
Status: DISCONTINUED | OUTPATIENT
Start: 2021-07-01 | End: 2021-07-01

## 2021-07-01 RX ORDER — PROPOFOL 10 MG/ML
INJECTION, EMULSION INTRAVENOUS AS NEEDED
Status: DISCONTINUED | OUTPATIENT
Start: 2021-07-01 | End: 2021-07-01

## 2021-07-01 RX ORDER — CEFAZOLIN SODIUM 1 G/3ML
INJECTION, POWDER, FOR SOLUTION INTRAMUSCULAR; INTRAVENOUS AS NEEDED
Status: DISCONTINUED | OUTPATIENT
Start: 2021-07-01 | End: 2021-07-01

## 2021-07-01 RX ORDER — FENTANYL CITRATE 50 UG/ML
INJECTION, SOLUTION INTRAMUSCULAR; INTRAVENOUS AS NEEDED
Status: DISCONTINUED | OUTPATIENT
Start: 2021-07-01 | End: 2021-07-01

## 2021-07-01 RX ORDER — LIDOCAINE HYDROCHLORIDE AND EPINEPHRINE 10; 10 MG/ML; UG/ML
INJECTION, SOLUTION INFILTRATION; PERINEURAL AS NEEDED
Status: DISCONTINUED | OUTPATIENT
Start: 2021-07-01 | End: 2021-07-01 | Stop reason: HOSPADM

## 2021-07-01 RX ORDER — OXYCODONE HYDROCHLORIDE AND ACETAMINOPHEN 5; 325 MG/1; MG/1
1 TABLET ORAL EVERY 6 HOURS PRN
Qty: 8 TABLET | Refills: 0 | Status: SHIPPED | OUTPATIENT
Start: 2021-07-01 | End: 2021-07-11

## 2021-07-01 RX ORDER — ONDANSETRON 2 MG/ML
INJECTION INTRAMUSCULAR; INTRAVENOUS AS NEEDED
Status: DISCONTINUED | OUTPATIENT
Start: 2021-07-01 | End: 2021-07-01

## 2021-07-01 RX ORDER — ONDANSETRON 2 MG/ML
4 INJECTION INTRAMUSCULAR; INTRAVENOUS ONCE AS NEEDED
Status: DISCONTINUED | OUTPATIENT
Start: 2021-07-01 | End: 2021-07-01 | Stop reason: HOSPADM

## 2021-07-01 RX ORDER — MAGNESIUM HYDROXIDE 1200 MG/15ML
LIQUID ORAL AS NEEDED
Status: DISCONTINUED | OUTPATIENT
Start: 2021-07-01 | End: 2021-07-01 | Stop reason: HOSPADM

## 2021-07-01 RX ORDER — SODIUM CHLORIDE, SODIUM LACTATE, POTASSIUM CHLORIDE, CALCIUM CHLORIDE 600; 310; 30; 20 MG/100ML; MG/100ML; MG/100ML; MG/100ML
125 INJECTION, SOLUTION INTRAVENOUS CONTINUOUS
Status: DISCONTINUED | OUTPATIENT
Start: 2021-07-01 | End: 2021-07-01 | Stop reason: HOSPADM

## 2021-07-01 RX ORDER — DEXMEDETOMIDINE HYDROCHLORIDE 100 UG/ML
INJECTION, SOLUTION INTRAVENOUS AS NEEDED
Status: DISCONTINUED | OUTPATIENT
Start: 2021-07-01 | End: 2021-07-01

## 2021-07-01 RX ADMIN — FENTANYL CITRATE 25 MCG: 50 INJECTION, SOLUTION INTRAMUSCULAR; INTRAVENOUS at 10:00

## 2021-07-01 RX ADMIN — ONDANSETRON 4 MG: 2 INJECTION INTRAMUSCULAR; INTRAVENOUS at 09:49

## 2021-07-01 RX ADMIN — CEFAZOLIN 2000 MG: 1 INJECTION, POWDER, FOR SOLUTION INTRAMUSCULAR; INTRAVENOUS; PARENTERAL at 09:43

## 2021-07-01 RX ADMIN — LIDOCAINE HYDROCHLORIDE 50 MG: 10 INJECTION, SOLUTION EPIDURAL; INFILTRATION; INTRACAUDAL; PERINEURAL at 09:37

## 2021-07-01 RX ADMIN — LIDOCAINE HYDROCHLORIDE 50 MG: 10 INJECTION, SOLUTION EPIDURAL; INFILTRATION; INTRACAUDAL; PERINEURAL at 09:35

## 2021-07-01 RX ADMIN — PROPOFOL 50 MG: 10 INJECTION, EMULSION INTRAVENOUS at 09:37

## 2021-07-01 RX ADMIN — FENTANYL CITRATE 25 MCG: 50 INJECTION, SOLUTION INTRAMUSCULAR; INTRAVENOUS at 09:57

## 2021-07-01 RX ADMIN — MIDAZOLAM 2 MG: 1 INJECTION INTRAMUSCULAR; INTRAVENOUS at 09:28

## 2021-07-01 RX ADMIN — DEXMEDETOMIDINE HYDROCHLORIDE 4 MCG: 100 INJECTION, SOLUTION INTRAVENOUS at 10:09

## 2021-07-01 RX ADMIN — PROPOFOL 300 MG: 10 INJECTION, EMULSION INTRAVENOUS at 09:35

## 2021-07-01 RX ADMIN — FENTANYL CITRATE 25 MCG: 50 INJECTION, SOLUTION INTRAMUSCULAR; INTRAVENOUS at 10:21

## 2021-07-01 RX ADMIN — FENTANYL CITRATE 25 MCG: 50 INJECTION, SOLUTION INTRAMUSCULAR; INTRAVENOUS at 10:17

## 2021-07-01 RX ADMIN — FENTANYL CITRATE 25 MCG: 50 INJECTION, SOLUTION INTRAMUSCULAR; INTRAVENOUS at 09:35

## 2021-07-01 RX ADMIN — DEXMEDETOMIDINE HYDROCHLORIDE 4 MCG: 100 INJECTION, SOLUTION INTRAVENOUS at 09:40

## 2021-07-01 RX ADMIN — SODIUM CHLORIDE, SODIUM LACTATE, POTASSIUM CHLORIDE, AND CALCIUM CHLORIDE: .6; .31; .03; .02 INJECTION, SOLUTION INTRAVENOUS at 09:24

## 2021-07-01 RX ADMIN — DEXMEDETOMIDINE HYDROCHLORIDE 4 MCG: 100 INJECTION, SOLUTION INTRAVENOUS at 09:38

## 2021-07-01 RX ADMIN — FENTANYL CITRATE 25 MCG: 50 INJECTION, SOLUTION INTRAMUSCULAR; INTRAVENOUS at 09:39

## 2021-07-01 RX ADMIN — DEXAMETHASONE SODIUM PHOSPHATE 8 MG: 10 INJECTION, SOLUTION INTRAMUSCULAR; INTRAVENOUS at 09:48

## 2021-07-01 NOTE — ANESTHESIA POSTPROCEDURE EVALUATION
Post-Op Assessment Note    CV Status:  Stable  Pain Score: 0    Pain management: adequate     Mental Status:  Alert and awake   Hydration Status:  Stable   PONV Controlled:  None   Airway Patency:  Patent      Post Op Vitals Reviewed: Yes      Staff: CRNA   Comments: Patient AAO in PACU, no c/o pain or nausea  Airway patent, VSS  No complications documented      /74 (07/01/21 1045)    Temp 99 3 °F (37 4 °C) (07/01/21 1045)    Pulse 100 (07/01/21 1045)   Resp 20 (07/01/21 1045)    SpO2 96 % (07/01/21 1045)

## 2021-07-01 NOTE — NURSING NOTE
Received from PACU at 1117  Alert  Pain 1/10 to both incisions on her scalp  Staples and sutures intact  No drainage  Surgical glue intact to right inner thigh incision  No drainage or redness  Respirations easy and non labored  IV fluids continue  Call bell in reach

## 2021-07-01 NOTE — OP NOTE
OPERATIVE REPORT  PATIENT NAME: Katy Swnason    :  1990  MRN: [id number]  Pt Location:  OR ROOM 11    SURGERY DATE: 2021    Surgeon(s) and Role:     * Eduardo Lindsay MD - Primary    Preop Diagnosis:  Pilar cysts [L72 11]  Dysplastic nevi [D23 9]    Post-Op Diagnosis Codes:     * Pilar cysts [L72 11]     * Dysplastic nevi [D23 9]    Procedure(s) (LRB):  1  Excision of scalp pilar lesions x2 (one required complex closure, other did not)  2  Excision of right inner thigh papilloma with complex closure (length 2 5 cm)      Specimen(s):  ID Type Source Tests Collected by Time Destination   1 : Scalp Lesions   ( quantity 2  )  Tissue Lesion TISSUE EXAM Eduardo Lindsay MD 2021  9:59 AM    2 : Right groin lesion Tissue Lesion TISSUE EXAM Eduardo Lindsay MD 2021 10:30 AM        Estimated Blood Loss:   Minimal    Drains:  * No LDAs found *    Anesthesia Type:   General    Operative Indications:  Pilar cysts [L72 11]  Dysplastic nevi [D23 9]    Operative Findings:  Left-sided pilar cyst had an additional cyst in the area upon excision which was also excised    Complications:   None    Procedure and Technique:  Patient was transported to the operating room, transferred to the operating table in supine fashion  After undergoing general anesthesia, a timeout was performed at which time all patient identifiers were deemed to be correct  The patient's scalp and right inner thigh were prepped and draped in the normal sterile fashion  3 cc of 1% lidocaine with epi was injected into each of the areas  After allowing for vasoconstrictive effect, I began with the scalp  The left side pilar cyst had a small area of skin ulceration  An elliptical incision was made around the area of ulceration and dissected down to the pilar cyst which was excised, this was located in the subcutaneous tissue   Of note, there was another completely separate cyst just anterior with it's own capsule that was also removed, found in subcutaneous tissue  The defect measured 2x1 cm  The wound was irrigated with normal saline and hemostasis was achieved with electrocautery  The surrounding skin and subcutaneous tissue was undermined and dissected in 1 cm in all directions to allow for tension free closure  I then proceeded with complex closure  3-0 monocryl was used to reapproximated the galea and deep tissue  A 1-0 prolene suture was used in running fashion to reapproximate the skin  I then directed my attention to the right sided pilar cyst which did not have any skin involvement  A 2 cm incision was made overlying the cyst  I dissected down to the cyst and it was excised en-bloc  The incision underwent intermediate closure with 3-0 monocryl for the dermis and staples for the skin  After this was completed, I proceeded with excision of the right inner thigh skin lesion  The lesion measured 0 8x0 5 cm in greatest dimension  An elliptical incision was made which measured 2 5x1 cm over the lesion and the lesion was excised and sent for path  The surrounding skin was undermined in all directions for 1 cm to allow for tension free closure  I then performed complex closure with reapproximation of the dermis with 3-0 monocryl and the skin with 4-0 monocryl  Dermabond was applied over the incision and bacitracin was applied to the scalp incisions  Patient tolerated the procedure well without complications  At the end of the case, all sponge, needle, and instrument counts were correct  Patient was awakened from anesthesia, and taken to PACU in stable condition      Patient Disposition:  PACU     SIGNATURE: Cristi Perez MD  DATE: July 1, 2021  TIME: 10:40 AM

## 2021-07-01 NOTE — DISCHARGE SUMMARY
Discharge Summary - Plastic Surgery   Rebecca Foster 35 y o  female MRN: [id number]  Unit/Bed#: OR POOL Encounter: [id number]    Admission Date:  7/1/21    Discharge Date: 7/1/21    Admitting Diagnosis: Pilar cysts [L72 11]  Dysplastic nevi [D23 9]    Discharge Diagnosis: same    Medical Problems     Resolved Problems  Date Reviewed: 6/14/2021    None                Attending: Anderson    Procedures Performed: Excision of pilar scalp cysts and right inner thigh skin lesion    Pathology: sent for path    Hospital Course: Patient underwent above noted procedures without complications and was discharged with RTC in 14 days for suture and staple removal from scalp  Condition at Discharge: good     Discharge instructions/Information to patient and family:   See after visit summary for information provided to patient and family  Discharge instructions    -Try over-the-counter tylenol or ibuprofen for pain  -Incisions will ooze for first few days, just change gauze as needed  -If need more pain control, take percocet  Do not drive or operate heavy machinery when taking narcotic pain medicine as it can cause drowsiness   -No showering for 48 hours  After 48 hours can remove dressings, shower  No submerging incisions (no baths, pools, hottubs)  -Keep incisions clean and dress scalp incisions with antibiotic ointment  -Right thigh incision has skin glue, just needs gauze for comfort   -No strenuous activity, no heavy lifting (nothing over 5 lbs)  -Sutures and staples will be removed in clinic in 14 days   -Take all medicines as prescribed  -Resume regular diet and home medications  -Call Plastic Surgery office to schedule post-op follow in 14 days        Ivory Cervantes MD   [facility] Plastic [facility] Reconstructive Surgery   [address], [address]    Office: [phone]      Provisions for Follow-Up Care:  See after visit summary for information related to follow-up care and any pertinent home health orders  Disposition: Home          Planned Readmission: No    Discharge Statement   I spent 10 minutes discharging the patient  This time was spent on the day of discharge  I had direct contact with the patient on the day of discharge  Additional documentation is required if more than 30 minutes were spent on discharge  Discharge Medications:  See after visit summary for reconciled discharge medications provided to patient and family

## 2021-07-01 NOTE — NURSING NOTE
Tolerated toast/drink  Ambulated to the bathroom with supervision of staff  Voided  Discharge instructions given verbally and written

## 2021-07-01 NOTE — DISCHARGE INSTRUCTIONS
Discharge instructions    -Try over-the-counter tylenol or ibuprofen for pain  -Incisions will ooze for first few days, just change gauze as needed  -If need more pain control, take percocet  Do not drive or operate heavy machinery when taking narcotic pain medicine as it can cause drowsiness   -No showering for 48 hours  After 48 hours can remove dressings, shower  No submerging incisions (no baths, pools, hottubs)  -Keep incisions clean and dress scalp incisions with antibiotic ointment  -Right thigh incision has skin glue, just needs gauze for comfort   -No strenuous activity, no heavy lifting (nothing over 5 lbs)    -Sutures and staples will be removed in clinic in 14 days   -Take all medicines as prescribed  -Resume regular diet and home medications  -Call Plastic Surgery office to schedule post-op follow in 14 days with Nurse Mare Leo MD   [address] [place] [address] and Reconstructive Surgery   [address] [facility] [address], [address]   Office: [phone]

## 2021-07-01 NOTE — H&P
H&P from 5/12/21, no new changes    Plastic Surgery Consult     Reason for visit: pilar scalp cysts and mole in right inner thigh     HPI:  Patient is a 34 y/o female who presents with pilar scalp cysts x2 and have aga there for years and have been increasing in size  They cause her discomfort, itch and get caught when combing  She also has a mole on her right inner thigh that has been there for as long as she can remember which has been enlarging and gets caught on clothing, irritated, and swells  She is interested in having these lesions removed  They do not drain, no purulence, no cellulitis      ROS: 12 pt ROS negative, except as otherwise noted in HPI      PMH: fibromyalgia, ankylosing spondylitis, anxiety, depression  FamHx: non-contrib  SurgHx: mole removed from umbilicus, wisdom teeth, tonsillectomy  SocHx: no tobacco, rare etoh  Meds: no blood thinners  Allergies: pretiq, depakote         PE:  Vitals:     05/12/21 1357   BP: 138/92   Pulse: (!) 128   Temp: 97 9 °F (36 6 °C)         General: NC/AT, breathing comfortably on RA  Neuro: CN II-XII grossly intact, symmetric reflexes  HEENT: PERRLA, EOMI, external ears normal, no lesions or deformities, neck supple, trachea midline  Respiratory: CTAB, normal respiratory effort  Cardio: RRR, normal S1, S2, no murmur, rubs, gallops  GI: soft, non-tender, non-distended  MSK: normal alignment, mobility, gait        Scalp pilar cysts x2 (2x1 cm anterior scalp, 1x1 cm left lateral scalp), tender with manipulation  Right inner thigh: 0 6x0 6 cm reddish papilloma on the right inner thigh, no drainage, cellulitis, mild discomfort with manipulation        Labs: none     Imaging: none     A/P: 34 y/o female who presents with pilar scalp cysts x2 and right inner thigh melanocytic papilloma  -Risks, benefits, complications, and procedure discussed for excision of all lesions with complex closure  Patient acknowledged  All questions answered and concerns addressed    -informed risk of recurrence, patient acknowldged  -consents obtained  -will obtain photos at procedure date  -Will call with surgery date

## 2021-07-07 ENCOUNTER — APPOINTMENT (OUTPATIENT)
Dept: PHYSICAL THERAPY | Age: 31
End: 2021-07-07
Payer: COMMERCIAL

## 2021-07-08 ENCOUNTER — APPOINTMENT (OUTPATIENT)
Dept: PHYSICAL THERAPY | Age: 31
End: 2021-07-08
Payer: COMMERCIAL

## 2021-07-09 ENCOUNTER — TELEMEDICINE (OUTPATIENT)
Dept: BEHAVIORAL/MENTAL HEALTH CLINIC | Facility: CLINIC | Age: 31
End: 2021-07-09
Payer: COMMERCIAL

## 2021-07-09 DIAGNOSIS — F41.1 GENERALIZED ANXIETY DISORDER: Chronic | ICD-10-CM

## 2021-07-09 DIAGNOSIS — F33.1 DEPRESSION, MAJOR, RECURRENT, MODERATE (HCC): Primary | Chronic | ICD-10-CM

## 2021-07-09 PROCEDURE — 90834 PSYTX W PT 45 MINUTES: CPT | Performed by: SOCIAL WORKER

## 2021-07-09 NOTE — PSYCH
Virtual Regular Visit    Assessment/Plan:    Problem List Items Addressed This Visit        Other    Generalized anxiety disorder (Chronic)    Depression, major, recurrent, moderate ([address] ) - Primary (Chronic)        Goals addressed in session: Goal 1      Reason for visit is Behavioral Health session, conducted through video, due to COVID-19 precautions  Teresa Weaver has verbalized a preference to continue with virtual sessions at this time  Teresa Weaver has been offered in-person sessions and has declined  Encounter provider APARNA Jones    Provider located at [address] [place] [id number] [phone]      Recent Visits  No visits were found meeting these conditions  Showing recent visits within past 7 days and meeting all other requirements  Future Appointments  No visits were found meeting these conditions  Showing future appointments within next 150 days and meeting all other requirements       The patient was identified by name and date of birth  Mara Salgado was informed that this is a telemedicine visit and that the visit is being conducted through [address] [place] [address] Now and patient was informed that this is a secure, HIPAA-compliant platform  She agrees to proceed     My office door was closed  No one else was in the room  She acknowledged consent and understanding of privacy and security of the video platform  The patient has agreed to participate and understands they can discontinue the visit at any time  Patient is aware this is a billable service  Elenita Westfall is a 28 y o  female  DATA: Met with Stephanie for scheduled individual session  Topics of discussion included physical health concerns and relationhip with SO  "I had my surgery " Stephanie discussed her recent surgery to remove cysts from her scalp  She states that her incisions are healing   She discussed her positive experience at [facility]  We spent time talking about Stephanie's relationship  She states that she does not know what her limits are at this point  She agreed to spend some time writing down the pros/cons of this relationship and determining her boundaries (and which boundaries might have already been crossed)  Client shows evidence of utilizing Mindfulness-based strategies skills to manage mental health symptoms  During this session, this clinician used the following therapeutic modalities: supportive psychotherapy, client-centered therapy, mindfulness-based strategies, DBT-informed skills, Motivational Interviewing and solution-focused therapy  ASSESSMENT: Anjelica Srivastava presents with a primarily euthymic mood  Her affect is normal range and intensity, appropriate  Anjelica Srivastava exhibits strong therapeutic rapport with this clinician  Anjelica Srivastava continues to exhibit willingness to work on treatment goals and objectives  Anjelica Srivastava presents with a minimal risk of suicide, minimal risk of self-harm, and minimal risk of harm to others  PLAN: Anjelica Srivastava will return in three weeks for the next scheduled session  Between sessions, Anjelica Srivastava will write down pros/cons and boundaries related to her current relationship  In addition, she will begin some meal prep and planning for improving her overall health and will report back during the next session re: successes and barriers  At the next session, this clinician will use supportive psychotherapy, client-centered therapy, mindfulness-based strategies, DBT-informed skills, Motivational Interviewing and solution-focused therapy to address her mood regulation and relationship issues, in an effort to assist Anjelica Srivastava with meeting treatment goals       HPI     Past Medical History:   Diagnosis Date    Abnormal Pap smear of cervix     Anxiety     Arthritis     Depression     Fibromyalgia, primary     Migraine     Vitamin B12 deficiency        Past Surgical History:   Procedure Laterality Date    COLONOSCOPY N/A 5/8/2018    Procedure: COLONOSCOPY;  Surgeon: Liliam Arevalo MD;  Location: [facility] GI [facility]; Service: Gastroenterology    ID EXC SKIN BENIG >4 CM REMAINDR BODY N/A 7/1/2021    Procedure: EXCISION OF PILAR SCALP CYST X 2 AND RIGHT INNER GROIN NEVVUS;  Surgeon: Ivory Cervantes MD;  Location: [address] [place] [address] OR;  Service: Plastics    ID RECMPL WND SCALP,EXTR 2 6-7 5 CM N/A 7/1/2021    Procedure: CLOSURE WOUND SCALP X 2 AND RIGHT INNER GROIN;  Surgeon: Ivory Cervantes MD;  Location: [address] [place] [address] OR;  Service: Plastics    TONSILLECTOMY      WISDOM TOOTH EXTRACTION         Current Outpatient Medications   Medication Sig Dispense Refill    acetaminophen (TYLENOL) 500 mg tablet Take 2 tablets (1,000 mg total) by mouth every 6 (six) hours as needed for mild pain or moderate pain 100 tablet 0    Adalimumab (Humira Pen) 40 MG/0 4ML PNKT Inject 0 4 mL (40 mg total) under the skin every 14 (fourteen) days   Aimovig 140 MG/ML SOAJ Inject 140mg under the skin every 30 (thirty) days  1 pen 10    ALPRAZolam (XANAX) 0 5 mg tablet Take 1 tablet (0 5 mg total) by mouth daily as needed for anxiety 30 tablet 0    ARIPiprazole (ABILIFY) 2 mg tablet Take 1 tablet (2 mg total) by mouth daily 30 tablet 2    B-D 3CC LUER-DEANN SYR 25GX1" 25G X 1" 3 ML MISC       buPROPion (WELLBUTRIN XL) 150 mg 24 hr tablet Take 1 tablet (150 mg total) by mouth daily 30 tablet 2    Cholecalciferol (VITAMIN D3) 44293 units CAPS Take by mouth once a week      dexamethasone (DECADRON) 2 mg tablet 1 tab qam with food prn migraine  5 tablet 0    escitalopram (LEXAPRO) 10 mg tablet Take 1 tablet (10 mg total) by mouth daily 30 tablet 2    etonogestrel-ethinyl estradiol (NuvaRing) 0 12-0 015 MG/24HR vaginal ring Insert ring for 21 days then remove for one week   3 each 3    furosemide (LASIX) 20 mg tablet Take 1 tablet (20 mg total) by mouth daily as needed (Lower extremity edema) 30 tablet 5    ketorolac (TORADOL) 10 mg tablet Take 1 tablet (10 mg total) by mouth every 6 (six) hours as needed (migraine) Max 2-3 per week   10 tablet 0    lidocaine (LIDODERM) 5 % Apply 1 patch topically daily Remove & Discard patch within 12 hours or as directed by MD (Patient not taking: Reported on 6/8/2021) 6 patch 0    melatonin 1 mg 1-2 tabs qhs prn insomnia 60 tablet 2    methocarbamol (ROBAXIN) 500 mg tablet Take 1 tablet (500 mg total) by mouth 3 (three) times a day as needed for muscle spasms (Patient not taking: Reported on 6/8/2021) 30 tablet 0    metoclopramide (REGLAN) 10 mg tablet Take 1 tablet (10 mg total) by mouth 3 (three) times a day as needed (headache or nausea) 30 tablet 0    naproxen (NAPROSYN) 500 mg tablet Take 1 tablet (500 mg total) by mouth 2 (two) times a day with meals for 7 days 14 tablet 0    onabotulinumtoxin A (BOTOX) 100 units Inject as directed      ondansetron (ZOFRAN) 4 mg tablet Take 1 tablet (4 mg total) by mouth every 8 (eight) hours as needed for nausea or vomiting 20 tablet 0    oxyCODONE-acetaminophen (PERCOCET) 5-325 mg per tablet Take 1 tablet by mouth every 6 (six) hours as needed for moderate pain for up to 10 daysMax Daily Amount: 4 tablets 8 tablet 0    phentermine (ADIPEX-P) 37 5 MG tablet Take 1 tablet (37 5 mg total) by mouth daily 30 tablet 2    predniSONE 10 mg tablet 6 tablets daily for 3 days, 5 for 3 days, 4 for 3 days, 3 for 3 days, 2 for 3 days, 1 for 3 days 63 tablet 0    pregabalin (LYRICA) 25 mg capsule Take 1 capsule (25 mg total) by mouth 2 (two) times a day 60 capsule 3    Syringe/Needle, Disp, (SYRINGE 3CC/12LN3-8/4") 27G X 1-1/4" 3 ML MISC Use for IM injection of ketorolac  4 each 0    vitamin B-12 (VITAMIN B-12) 1,000 mcg tablet Take 1,000 mcg by mouth daily       Current Facility-Administered Medications   Medication Dose Route Frequency Provider Last Rate Last Admin    cyanocobalamin injection 1,000 mcg  1,000 mcg Intramuscular Q30 Days Erminio Limb, DO   1,000 mcg at 08/03/20 0859    cyanocobalamin injection 1,000 mcg  1,000 mcg Intramuscular Q14 Days Contreras Salts, DO   1,000 mcg at 05/18/20 1146    cyanocobalamin injection 1,000 mcg  1,000 mcg Intramuscular Q30 Days Contreras Salts, DO   1,000 mcg at 09/01/20 1129        Allergies   Allergen Reactions    Desvenlafaxine      Other reaction(s): state of confusion    Valproic Acid Other (See Comments)     Other reaction(s): dilated pupils, "schizi"    Tizanidine Anxiety       Review of Systems    Video Exam    There were no vitals filed for this visit  Physical Exam     I spent 45 minutes directly with the patient during this visit      VIRTUAL VISIT [id number] [address] Mar[address]o acknowledges that she has consented to an online visit or consultation  She understands that the online visit is based solely on information provided by her, and that, in the absence of a face-to-face physical evaluation by the physician, the diagnosis she receives is both limited and provisional in terms of accuracy and completeness  This is not intended to replace a full medical face-to-face evaluation by the physician  Juanita Issa understands and accepts these terms

## 2021-07-13 ENCOUNTER — OFFICE VISIT (OUTPATIENT)
Dept: PHYSICAL THERAPY | Age: 31
End: 2021-07-13
Payer: COMMERCIAL

## 2021-07-13 DIAGNOSIS — M25.50 ARTHRALGIA OF MULTIPLE SITES: Primary | ICD-10-CM

## 2021-07-13 PROCEDURE — 97110 THERAPEUTIC EXERCISES: CPT | Performed by: PHYSICAL THERAPIST

## 2021-07-13 NOTE — PROGRESS NOTES
Daily Note     Today's date: 2021  Patient name: Juanita Issa  : 1990  MRN: [id number]  Referring provider: Mine Perkins PA-C  Dx:   Encounter Diagnosis     ICD-10-CM    1  Arthralgia of multiple sites  M25 50                   Subjective: Better this week - had to come off [place] last week - increased sx's  Objective: See treatment diary below      Assessment: Tolerated treatment well  Patient would benefit from continued PT      Plan: Continue per plan of care  Precautions: fibromyalgia, chronic fatigue      Daily Treatment Diary     Manual  5/18 5/25 6/1 6/8 6/15 6/29                                                                            Exercise Diary                           Walking    x5 x5 x5                                 Seated hamstring stretch x4 hold 20 sec  x4 x4 x4 x4 x4       Seated piriformis stretch x4 hold 20 sec  x4 x4 x4 x4 x4                    Standing hip flexion 2x10 2x10 2x10 2x10 2x10 2x12       Standing hip abd 2x10 2x10 2x10 2x10 2x10 2x12                    Standing knee flexion 2x15 2x15 2x15 2x15 2x15 2x15       Step ups 2x10 2x10 2x10 2x10 2x10 2x12 - each                    Pony cycling x5 minutes x5 x5 x5 x5 x5       Pony cross country skiing x5 minutes x5 x5 x5 x5 x5       Heel raises/toe raises 2x15 2x15 2x15 2x15 2x15 2x15       Heel-to-toe balance x10 reps each x10 x10 x10 x10 x10       Pony    Hip abd/add - 3x15 3x15 3x15       Standing quad stretch     x5 reps hold 30 sec  x5                        Modalities                                                                Manuals 6/24 7/13 5/28 6/3 6/17   LC x10 minutes LC - 10 minutes x10 minutes x10 minutes x10 minutes                           Neuro Re-Ed         SLS        Tandem on foam                                                Ther Ex        Supine piriformis stretch x5 x5 reps hold 20 sec   x5 reps x5 reps x5   SLR flexion 2x10 2x10 2x10 Standing today 2x10   SLR abd 2x10 2x10 2x10 Standing today 2x10           cybex leg press 40# 3x10 - 40# 3x10 3x10 3x10   cybex knee flexion 30# 3x10  3x10 3x10 3x10   SLS on blue foam X10 x10 hold 5 sec each x5 x5 x5   Bridging with hip abd 3X10 With yellow TB - 3x10 2x10 3x10 3x10   Cybex hip abd 3 plates - 9I17 3 sets  2 plates - 1X87 1I64           Squats on 1/2 roll nt 3x10   2x10   SLS   x10 hold 5 sec                                                Ther Activity                        Gait Training                        Modalities

## 2021-07-14 ENCOUNTER — ANNUAL EXAM (OUTPATIENT)
Dept: OBGYN CLINIC | Facility: MEDICAL CENTER | Age: 31
End: 2021-07-14
Payer: COMMERCIAL

## 2021-07-14 VITALS
HEIGHT: 63 IN | BODY MASS INDEX: 42.52 KG/M2 | SYSTOLIC BLOOD PRESSURE: 120 MMHG | DIASTOLIC BLOOD PRESSURE: 86 MMHG | WEIGHT: 240 LBS

## 2021-07-14 DIAGNOSIS — Z11.3 SCREEN FOR STD (SEXUALLY TRANSMITTED DISEASE): ICD-10-CM

## 2021-07-14 DIAGNOSIS — Z30.44 ENCOUNTER FOR SURVEILLANCE OF VAGINAL RING HORMONAL CONTRACEPTIVE DEVICE: ICD-10-CM

## 2021-07-14 DIAGNOSIS — Z01.419 ENCOUNTER FOR ROUTINE GYNECOLOGICAL EXAMINATION WITH PAPANICOLAOU SMEAR OF CERVIX: Primary | ICD-10-CM

## 2021-07-14 DIAGNOSIS — F41.1 GENERALIZED ANXIETY DISORDER: ICD-10-CM

## 2021-07-14 LAB
HPV HR 12 DNA CVX QL NAA+PROBE: POSITIVE
HPV16 DNA CVX QL NAA+PROBE: NEGATIVE
HPV18 DNA CVX QL NAA+PROBE: NEGATIVE

## 2021-07-14 PROCEDURE — 87591 N.GONORRHOEAE DNA AMP PROB: CPT | Performed by: OBSTETRICS & GYNECOLOGY

## 2021-07-14 PROCEDURE — 99395 PREV VISIT EST AGE 18-39: CPT | Performed by: OBSTETRICS & GYNECOLOGY

## 2021-07-14 PROCEDURE — 0503F POSTPARTUM CARE VISIT: CPT | Performed by: OBSTETRICS & GYNECOLOGY

## 2021-07-14 PROCEDURE — G0476 HPV COMBO ASSAY CA SCREEN: HCPCS | Performed by: OBSTETRICS & GYNECOLOGY

## 2021-07-14 PROCEDURE — 87491 CHLMYD TRACH DNA AMP PROBE: CPT | Performed by: OBSTETRICS & GYNECOLOGY

## 2021-07-14 PROCEDURE — G0145 SCR C/V CYTO,THINLAYER,RESCR: HCPCS | Performed by: OBSTETRICS & GYNECOLOGY

## 2021-07-14 RX ORDER — ALPRAZOLAM 0.5 MG/1
0.5 TABLET ORAL DAILY PRN
Qty: 30 TABLET | Refills: 0 | Status: SHIPPED | OUTPATIENT
Start: 2021-07-14 | End: 2021-08-16 | Stop reason: SDUPTHER

## 2021-07-14 RX ORDER — ETONOGESTREL AND ETHINYL ESTRADIOL 11.7; 2.7 MG/1; MG/1
INSERT, EXTENDED RELEASE VAGINAL
Qty: 3 EACH | Refills: 3 | Status: SHIPPED | OUTPATIENT
Start: 2021-07-14 | End: 2022-05-27 | Stop reason: SDUPTHER

## 2021-07-14 NOTE — PROGRESS NOTES
ASSESSMENT & PLAN: Artemus Angelucci was seen today for gynecologic exam     Diagnoses and all orders for this visit:    Encounter for routine gynecological examination with Papanicolaou smear of cervix    Encounter for surveillance of vaginal ring hormonal contraceptive device  -     etonogestrel-ethinyl estradiol (NuvaRing) 0 12-0 015 MG/24HR vaginal ring; Insert ring for 21 days then remove for one week  Screen for STD (sexually transmitted disease)  -     RPR; Future  -     HIV 1/2 ANTIGEN/ANTIBODY (4TH GENERATION) W REFLEX [facility]; Future  -     Hepatitis C antibody; Future  -     Hepatitis B surface antigen; Future  -     Herpes I/II IgG ALINE w Reflex to HSV-2; Future          1  Routine well woman exam done today  2  Pap and HPV:  Patient's pap is not current  Pap and cotesting was done today  Current ASCCP Guidelines reviewed  3   Patient has had her Gardasil vaccination  Recommendations reviewed  4   The following were reviewed in today's visit: adequate intake of calcium and vitamin D, exercise and healthy diet  5   F/u in 1 year for next routine gyn exam   6   RF of Nuvaring  7   Pt desires STD testing: GC/Chl cultures taken today  Pt given lab slip for HIV, hepatitis, HSV and RPR  CC:  Annual Gynecologic Examination    HPI: Jovani Russell is a 28 y o  who presents for annual gynecologic examination  She has the following concerns:  No issues  On Nuvaring continuously  Has gained a lot of weight, thinks due to humira  Has weight management appt next month  Health Maintenance:    Patient reports her health to be fair  She does have weight concerns  She exercises 2 days per week with walking and PT  She does wear her seatbelt routinely  She does perform regular monthly self breast exams  She does feels safe at home  Patients does follow a healthy diet  She gets 1 servings of dairy or calcium rich foods a day      Patient Active Problem List   Diagnosis    Generalized anxiety disorder    Migraine headache    Cervical radiculitis    Hyperlipidemia    Lumbar radiculopathy    Seasonal allergies    Vitamin B12 deficiency    Vitamin D deficiency    Anterior chest wall pain    Chronic migraine without aura without status migrainosus, not intractable    Alternating constipation and diarrhea    Abnormal bowel habits    Chronic fatigue    Memory loss    Cognitive decline    Migraine with aura and with status migrainosus    Possible pregnancy    Dental infection    Upper respiratory infection    Sleep disturbance    Snoring    Fibromyalgia syndrome    Obesity (BMI 30-39  9)    Intractable migraine with aura without status migrainosus    Hand dermatitis    URI (upper respiratory infection)    Depression, major, recurrent, moderate (HCC)    Irritant contact dermatitis due to oils    Chronic heel pain, left    Other complicated headache syndrome    Insomnia due to medical condition    Well adult exam    Paresthesias    Pharyngitis due to Streptococcus species    Elevated blood pressure reading    Encephalopathy    Pilar cysts    Dysplastic nevi    Arthralgia of multiple sites    Chronic low back pain    Elevated C-reactive protein    Inflammatory spondylopathy (HCC)    Knee pain    Cystitis    Lower extremity edema    TMJ pain dysfunction syndrome       Past Medical History:   Diagnosis Date    Abnormal Pap smear of cervix     Anxiety     Arthritis     Depression     Fibromyalgia, primary     Migraine     Vitamin B12 deficiency        Past Surgical History:   Procedure Laterality Date    COLONOSCOPY N/A 5/8/2018    Procedure: COLONOSCOPY;  Surgeon: Liliam Arevalo MD;  Location: [facility];   Service: Gastroenterology    KS EXC SKIN BENIG >4 CM REMAINDR BODY N/A 7/1/2021    Procedure: EXCISION OF PILAR SCALP CYST X 2 AND RIGHT INNER GROIN NEVVUS;  Surgeon: Ivory Cervantes MD;  Location: [address] [place] [address];  Service: Plastics    KS RECMPL WND SCALP,EXTR 2 6-7 5 CM N/A 7/1/2021    Procedure: CLOSURE WOUND SCALP X 2 AND RIGHT INNER GROIN;  Surgeon: Saul Haque MD;  Location: [address] [place] [address];  Service: Plastics    TONSILLECTOMY      WISDOM TOOTH EXTRACTION         Past OB/Gyn History:  Pt does not have menstrual issues  No menses on Nuvaring  History of sexually transmitted infection: Yes   HPV  History of abnormal pap smears: Yes s/p colpo 2020 no dysplasia  Patient is currently sexually active  heterosexual    The current method of family planning is NuvaRing vaginal inserts  Family History   Problem Relation Age of Onset    Rheum arthritis Mother     Psoriasis Mother     Other Mother     Hypertension Mother     Diabetes unspecified Mother     Sjogren's syndrome Mother     Alcohol abuse Mother     Drug abuse Mother     Anxiety disorder Father     Alcohol abuse Father     Cancer Other     Diabetes Other     Other Other         High blood pressure    Depression Maternal Grandmother        Social History:  Social History     Socioeconomic History    Marital status: Single     Spouse name: Not on file    Number of children: 0    Years of education: 15    Highest education level: Not on file   Occupational History    Occupation: Unemployed     Employer: [employer]   Tobacco Use    Smoking status: Never Smoker    Smokeless tobacco: Never Used   Vaping Use    Vaping Use: Former    Start date: 7/1/2019    Substances: THC   Substance and Sexual Activity    Alcohol use: No    Drug use: Yes     Types: Marijuana     Comment: Medical marijuana not using     Sexual activity: Yes     Partners: Male     Comment: Nuva ring   Other Topics Concern    Not on file   Social History Narrative    Home:  Living with mom and MGM        Education:    Pt denies any h/o learning disability Dxs but admits to having great difficulty in math requiring a   She reached childhood milestones on time as far as he knows      Graduated HS 2009    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 college art classes--she stopped due to anxiety from dissatisfaction with her classes--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 felt they were telling her what to create  On reflection, she feels it was moreso her anxiety as the cause of her leaving school, and she was using the other reason as an excuse so she would not have to admit to anxiety at that time  She is now very open about her anxiety and does not mind that I have this information in the general social section of her chart  Social Determinants of Health     Financial Resource Strain: High Risk    Difficulty of Paying Living Expenses: Very hard   Food Insecurity: Food Insecurity Present    Worried About Running Out of Food in the Last Year: Often true    Fermín of Food in the Last Year: Never true   Transportation Needs: No Transportation Needs    Lack of Transportation (Medical): No    Lack of Transportation (Non-Medical): No   Physical Activity: Unknown    Days of Exercise per Week: 0 days    Minutes of Exercise per Session: Not on file   Stress:     Feeling of Stress :    Social Connections:     Frequency of Communication with Friends and Family:     Frequency of Social Gatherings with Friends and Family:     Attends Roman [employer] Services:     Active Member of Clubs or Organizations:     Attends Club or Organization Meetings:     Marital Status:    Intimate Partner Violence:     Fear of Current or Ex-Partner:     Emotionally Abused:     Physically Abused:     Sexually Abused:      Presently lives mother  Patient is currently employed    Allergies   Allergen Reactions    Desvenlafaxine      Other reaction(s): state of confusion    Valproic Acid Other (See Comments)     Other reaction(s): dilated pupils, "schizi"    Tizanidine Anxiety       Current Outpatient Medications:     acetaminophen (TYLENOL) 500 mg tablet, Take 2 tablets (1,000 mg total) by mouth every 6 (six) hours as needed for mild pain or moderate pain, Disp: 100 tablet, Rfl: 0    Adalimumab (Humira Pen) 40 MG/0 4ML PNKT, Inject 0 4 mL (40 mg total) under the skin every 14 (fourteen) days  , Disp: , Rfl:     Aimovig 140 MG/ML SOAJ, Inject 140mg under the skin every 30 (thirty) days  , Disp: 1 pen, Rfl: 10    ALPRAZolam (XANAX) 0 5 mg tablet, Take 1 tablet (0 5 mg total) by mouth daily as needed for anxiety, Disp: 30 tablet, Rfl: 0    ARIPiprazole (ABILIFY) 2 mg tablet, Take 1 tablet (2 mg total) by mouth daily, Disp: 30 tablet, Rfl: 2    buPROPion (WELLBUTRIN XL) 150 mg 24 hr tablet, Take 1 tablet (150 mg total) by mouth daily, Disp: 30 tablet, Rfl: 2    Cholecalciferol (VITAMIN D3) 85027 units CAPS, Take by mouth once a week, Disp: , Rfl:     dexamethasone (DECADRON) 2 mg tablet, 1 tab qam with food prn migraine  , Disp: 5 tablet, Rfl: 0    escitalopram (LEXAPRO) 10 mg tablet, Take 1 tablet (10 mg total) by mouth daily, Disp: 30 tablet, Rfl: 2    etonogestrel-ethinyl estradiol (NuvaRing) 0 12-0 015 MG/24HR vaginal ring, Insert ring for 21 days then remove for one week , Disp: 3 each, Rfl: 3    furosemide (LASIX) 20 mg tablet, Take 1 tablet (20 mg total) by mouth daily as needed (Lower extremity edema), Disp: 30 tablet, Rfl: 5    ketorolac (TORADOL) 10 mg tablet, Take 1 tablet (10 mg total) by mouth every 6 (six) hours as needed (migraine) Max 2-3 per week , Disp: 10 tablet, Rfl: 0    melatonin 1 mg, 1-2 tabs qhs prn insomnia, Disp: 60 tablet, Rfl: 2    naproxen (NAPROSYN) 500 mg tablet, Take 1 tablet (500 mg total) by mouth 2 (two) times a day with meals for 7 days, Disp: 14 tablet, Rfl: 0    pregabalin (LYRICA) 25 mg capsule, Take 1 capsule (25 mg total) by mouth 2 (two) times a day, Disp: 60 capsule, Rfl: 3    vitamin B-12 (VITAMIN B-12) 1,000 mcg tablet, Take 1,000 mcg by mouth daily, Disp: , Rfl:     B-D 3CC LUER-DEANN SYR 25GX1" 25G X 1" 3 ML MISC, , Disp: , Rfl:     lidocaine (LIDODERM) 5 %, Apply 1 patch topically daily Remove & Discard patch within 12 hours or as directed by MD (Patient not taking: Reported on 6/8/2021), Disp: 6 patch, Rfl: 0    methocarbamol (ROBAXIN) 500 mg tablet, Take 1 tablet (500 mg total) by mouth 3 (three) times a day as needed for muscle spasms (Patient not taking: Reported on 6/8/2021), Disp: 30 tablet, Rfl: 0    metoclopramide (REGLAN) 10 mg tablet, Take 1 tablet (10 mg total) by mouth 3 (three) times a day as needed (headache or nausea), Disp: 30 tablet, Rfl: 0    onabotulinumtoxin A (BOTOX) 100 units, Inject as directed, Disp: , Rfl:     ondansetron (ZOFRAN) 4 mg tablet, Take 1 tablet (4 mg total) by mouth every 8 (eight) hours as needed for nausea or vomiting (Patient not taking: Reported on 7/14/2021), Disp: 20 tablet, Rfl: 0    phentermine (ADIPEX-P) 37 5 MG tablet, Take 1 tablet (37 5 mg total) by mouth daily (Patient not taking: Reported on 7/14/2021), Disp: 30 tablet, Rfl: 2    predniSONE 10 mg tablet, 6 tablets daily for 3 days, 5 for 3 days, 4 for 3 days, 3 for 3 days, 2 for 3 days, 1 for 3 days (Patient not taking: Reported on 7/14/2021), Disp: 63 tablet, Rfl: 0    Syringe/Needle, Disp, (SYRINGE 3CC/13XC0-0/4") 27G X 1-1/4" 3 ML MISC, Use for IM injection of ketorolac , Disp: 4 each, Rfl: 0    Current Facility-Administered Medications:     cyanocobalamin injection 1,000 mcg, 1,000 mcg, Intramuscular, Q30 Days, Kaleb Palm DO, 1,000 mcg at 08/03/20 9505    cyanocobalamin injection 1,000 mcg, 1,000 mcg, Intramuscular, Q14 Days, Kaleb Palm DO, 1,000 mcg at 05/18/20 1146    cyanocobalamin injection 1,000 mcg, 1,000 mcg, Intramuscular, Q30 Days, Kaleb Palm DO, 1,000 mcg at 09/01/20 1129      Review of Systems  Constitutional :no fever, feels well, no tiredness, recent weight gain  ENT: no ear ache, no loss of hearing, no nosebleeds or nasal discharge, no sore throat or hoarseness    Cardiovascular: no complaints of slow or fast heart beat, no chest pain, no palpitations, no leg claudication or lower extremity edema  Respiratory: no complaints of shortness of shortness of breath, no MAURO  Breasts:no complaints of breast pain, breast lump, or nipple discharge  Gastrointestinal: no complaints of abdominal pain, constipation, nausea, vomiting, or diarrhea or bloody stools  Genitourinary : no complaints of dysuria, incontinence, pelvic pain, no dysmenorrhea, vaginal discharge or abnormal vaginal bleeding and as noted in HPI  Musculoskeletal: +complaints of arthralgia, no myalgia, no joint swelling or stiffness, +swelling of ankles  Integumentary: no complaints of skin rash or lesion, itching or dry skin  Neurological: no complaints of headache, no confusion, no numbness or tingling, no dizziness or fainting    Physical Exam:   /86   Ht 5' 3" (1 6 m)   Wt 109 kg (240 lb)   LMP  (LMP Unknown)   BMI 42 51 kg/m²     General: appears stated age, cooperative, alert normal mood and affect   Psychiatric oriented to person, place and time  Mood and affect normal   Neck: normal, supple,trachea midline, no masses  Thyroid: normal, no thyromegaly   Heart: regular rate and rhythm, S1, S2 normal, no murmur, click, rub or gallop   Lungs: clear to auscultation bilaterally, no increased work of breathing or signs of respiratory distress   Breasts: normal, no dimpling or skin changes noted, mod f-changes outer quad b/l   Abdomen: soft, non-tender, without masses or organomegaly   Vulva: normal , no lesions   Vagina: normal , no lesions or dryness   Urethra: normal   Urethal meatus normal   Bladder Normal, soft, non-tender and no prolapse or masses appreciated   Cervix: normal, no palpable masses    Uterus: normal , non-tender, not enlarged, no palpable masses   Adnexa: normal, non-tender without fullness or masses   Lymphatic Palpation of lymph nodes in neck, axilla, groin and/or other locations: no lymphadenopathy or masses noted   Skin Normal skin turgor and no rashes  Palpation of skin and subcutaneous tissue normal

## 2021-07-14 NOTE — PATIENT INSTRUCTIONS
Thank you for your confidence in our team    We appreciate you and welcome your feedback  If you receive a survey from us, please take a few moments to let us know how we are doing     Sincerely,   Mary Dennis MD

## 2021-07-15 ENCOUNTER — OFFICE VISIT (OUTPATIENT)
Dept: PLASTIC SURGERY | Facility: CLINIC | Age: 31
End: 2021-07-15

## 2021-07-15 DIAGNOSIS — Z98.890 POST-OPERATIVE STATE: Primary | ICD-10-CM

## 2021-07-15 PROCEDURE — 99024 POSTOP FOLLOW-UP VISIT: CPT

## 2021-07-15 NOTE — PROGRESS NOTES
Octavio Browne was seen in the office today for the post op visit following the excision of two pilar cysts of the scalp and excision of the right inner groin nevus on 7/1  The staples and sutures were removed from the scalp  Incisions healing well  The groin incision clean, dry and intact  The pictures were taken  Patient declined the instructions on the scar gel massages for the groin site  Octavio Browne will return in a month for another follow up with our office  All questions were answered

## 2021-07-16 ENCOUNTER — TELEPHONE (OUTPATIENT)
Dept: OBGYN CLINIC | Facility: MEDICAL CENTER | Age: 31
End: 2021-07-16

## 2021-07-16 ENCOUNTER — OFFICE VISIT (OUTPATIENT)
Dept: FAMILY MEDICINE CLINIC | Facility: CLINIC | Age: 31
End: 2021-07-16
Payer: COMMERCIAL

## 2021-07-16 VITALS
BODY MASS INDEX: 42.45 KG/M2 | TEMPERATURE: 96.9 F | SYSTOLIC BLOOD PRESSURE: 138 MMHG | HEIGHT: 63 IN | WEIGHT: 239.6 LBS | DIASTOLIC BLOOD PRESSURE: 88 MMHG

## 2021-07-16 DIAGNOSIS — R60.0 LOWER EXTREMITY EDEMA: Primary | ICD-10-CM

## 2021-07-16 LAB
C TRACH DNA SPEC QL NAA+PROBE: NEGATIVE
N GONORRHOEA DNA SPEC QL NAA+PROBE: NEGATIVE

## 2021-07-16 PROCEDURE — 99213 OFFICE O/P EST LOW 20 MIN: CPT | Performed by: FAMILY MEDICINE

## 2021-07-16 PROCEDURE — 3008F BODY MASS INDEX DOCD: CPT | Performed by: FAMILY MEDICINE

## 2021-07-16 PROCEDURE — 1036F TOBACCO NON-USER: CPT | Performed by: FAMILY MEDICINE

## 2021-07-16 RX ORDER — FUROSEMIDE 40 MG/1
40 TABLET ORAL DAILY PRN
Qty: 90 TABLET | Refills: 1
Start: 2021-07-16 | End: 2021-09-17

## 2021-07-16 NOTE — PROGRESS NOTES
Assessment/Plan:  Labs in records reviewed  Patient will increase Lasix to 40 mg daily as needed for swelling  Patient go for venous duplex scan of bilateral lower extremities and echocardiogram   To consider reducing Lyrica in future as possible etiologies swelling  Diagnoses and all orders for this visit:    Lower extremity edema  -     VAS lower limb venous duplex study, complete bilateral; Future  -     Echo complete with contrast if indicated; Future  -     furosemide (LASIX) 40 mg tablet; Take 1 tablet (40 mg total) by mouth daily as needed (Lower extremity edema)            Subjective:        Patient ID: Daphne Courser is a 28 y o  female  Patient is here with increased swelling of lower extremities well as hands  No chest pain or shortness of breath or abdominal pain or problems urinating associated with this  No worsening of headaches  Patient did use Lasix 20 mg without any significant improvement  The following portions of the patient's history were reviewed and updated as appropriate: allergies, current medications, past family history, past medical history, past social history, past surgical history and problem list       Review of Systems   Constitutional: Negative  HENT: Negative  Eyes: Negative  Respiratory: Negative  Cardiovascular: Positive for leg swelling  Gastrointestinal: Negative  Endocrine: Negative  Genitourinary: Negative  Musculoskeletal: Negative  Skin: Negative  Allergic/Immunologic: Negative  Neurological: Negative  Hematological: Negative  Psychiatric/Behavioral: Negative  Objective:               /88 (BP Location: Right arm, Patient Position: Sitting, Cuff Size: Standard)   Temp (!) 96 9 °F (36 1 °C) (Tympanic)   Ht 5' 3" (1 6 m)   Wt 109 kg (239 lb 9 6 oz)   LMP  (LMP Unknown)   BMI 42 44 kg/m²          Physical Exam  Vitals and nursing note reviewed     Constitutional:       General: She is not in acute distress  Appearance: Normal appearance  She is not ill-appearing, toxic-appearing or diaphoretic  HENT:      Head: Normocephalic and atraumatic  Right Ear: Tympanic membrane, ear canal and external ear normal  There is no impacted cerumen  Left Ear: Tympanic membrane, ear canal and external ear normal  There is no impacted cerumen  Nose: Nose normal  No congestion or rhinorrhea  Mouth/Throat:      Mouth: Mucous membranes are moist       Pharynx: No oropharyngeal exudate or posterior oropharyngeal erythema  Eyes:      General: No scleral icterus  Right eye: No discharge  Left eye: No discharge  Extraocular Movements: Extraocular movements intact  Conjunctiva/sclera: Conjunctivae normal       Pupils: Pupils are equal, round, and reactive to light  Neck:      Vascular: No carotid bruit  Cardiovascular:      Rate and Rhythm: Normal rate and regular rhythm  Pulses: Normal pulses  Heart sounds: Normal heart sounds  No murmur heard  No friction rub  No gallop  Pulmonary:      Effort: Pulmonary effort is normal  No respiratory distress  Breath sounds: Normal breath sounds  No stridor  No wheezing, rhonchi or rales  Chest:      Chest wall: No tenderness  Musculoskeletal:         General: No swelling, tenderness, deformity or signs of injury  Cervical back: Normal range of motion and neck supple  No rigidity  No muscular tenderness  Right lower leg: Edema present  Left lower leg: Edema present  Comments: Trace lower extremity edema  Lymphadenopathy:      Cervical: No cervical adenopathy  Skin:     General: Skin is warm and dry  Capillary Refill: Capillary refill takes less than 2 seconds  Coloration: Skin is not jaundiced  Findings: No bruising, erythema, lesion or rash  Neurological:      Mental Status: She is alert and oriented to person, place, and time  Mental status is at baseline        Cranial Nerves: No cranial nerve deficit  Sensory: No sensory deficit  Motor: No weakness  Coordination: Coordination normal       Gait: Gait normal    Psychiatric:         Mood and Affect: Mood normal          Behavior: Behavior normal          Thought Content:  Thought content normal          Judgment: Judgment normal

## 2021-07-20 ENCOUNTER — HOSPITAL ENCOUNTER (EMERGENCY)
Facility: HOSPITAL | Age: 31
Discharge: HOME | End: 2021-07-20
Attending: EMERGENCY MEDICINE
Payer: COMMERCIAL

## 2021-07-20 ENCOUNTER — PROCEDURE VISIT (OUTPATIENT)
Dept: NEUROLOGY | Facility: CLINIC | Age: 31
End: 2021-07-20
Payer: COMMERCIAL

## 2021-07-20 VITALS — SYSTOLIC BLOOD PRESSURE: 125 MMHG | DIASTOLIC BLOOD PRESSURE: 80 MMHG | HEART RATE: 99 BPM | TEMPERATURE: 98.3 F

## 2021-07-20 VITALS
SYSTOLIC BLOOD PRESSURE: 129 MMHG | HEIGHT: 63 IN | DIASTOLIC BLOOD PRESSURE: 82 MMHG | WEIGHT: 240 LBS | RESPIRATION RATE: 16 BRPM | BODY MASS INDEX: 42.52 KG/M2 | TEMPERATURE: 98.4 F | OXYGEN SATURATION: 98 % | HEART RATE: 82 BPM

## 2021-07-20 DIAGNOSIS — G43.709 CHRONIC MIGRAINE WITHOUT AURA WITHOUT STATUS MIGRAINOSUS, NOT INTRACTABLE: Primary | ICD-10-CM

## 2021-07-20 DIAGNOSIS — R07.89 CHEST WALL PAIN: Primary | ICD-10-CM

## 2021-07-20 DIAGNOSIS — G43.701 CHRONIC MIGRAINE WITHOUT AURA WITH STATUS MIGRAINOSUS, NOT INTRACTABLE: ICD-10-CM

## 2021-07-20 LAB
EXT PREG TEST URINE: NORMAL
EXT. CONTROL ED NAV: NORMAL

## 2021-07-20 PROCEDURE — 99283 EMERGENCY DEPT VISIT LOW MDM: CPT | Performed by: PHYSICIAN ASSISTANT

## 2021-07-20 PROCEDURE — 64615 CHEMODENERV MUSC MIGRAINE: CPT | Performed by: PHYSICIAN ASSISTANT

## 2021-07-20 PROCEDURE — 81025 URINE PREGNANCY TEST: CPT | Performed by: PHYSICIAN ASSISTANT

## 2021-07-20 PROCEDURE — 99283 EMERGENCY DEPT VISIT LOW MDM: CPT

## 2021-07-20 PROCEDURE — 96372 THER/PROPH/DIAG INJ SC/IM: CPT

## 2021-07-20 RX ORDER — KETOROLAC TROMETHAMINE 30 MG/ML
15 INJECTION, SOLUTION INTRAMUSCULAR; INTRAVENOUS ONCE
Status: COMPLETED | OUTPATIENT
Start: 2021-07-20 | End: 2021-07-20

## 2021-07-20 RX ORDER — METHOCARBAMOL 500 MG/1
500 TABLET, FILM COATED ORAL ONCE
Status: COMPLETED | OUTPATIENT
Start: 2021-07-20 | End: 2021-07-20

## 2021-07-20 RX ORDER — METHOCARBAMOL 500 MG/1
500 TABLET, FILM COATED ORAL 2 TIMES DAILY
Qty: 4 TABLET | Refills: 0 | Status: SHIPPED | OUTPATIENT
Start: 2021-07-20 | End: 2021-09-03

## 2021-07-20 RX ORDER — ACETAMINOPHEN 325 MG/1
975 TABLET ORAL ONCE
Status: COMPLETED | OUTPATIENT
Start: 2021-07-20 | End: 2021-07-20

## 2021-07-20 RX ADMIN — METHOCARBAMOL 500 MG: 500 TABLET ORAL at 10:22

## 2021-07-20 RX ADMIN — KETOROLAC TROMETHAMINE 15 MG: 30 INJECTION, SOLUTION INTRAMUSCULAR; INTRAVENOUS at 10:24

## 2021-07-20 RX ADMIN — ACETAMINOPHEN 975 MG: 325 TABLET ORAL at 10:22

## 2021-07-20 NOTE — DISCHARGE INSTRUCTIONS
Take naproxen tonight with food  You may also use up to a 1000 mg of Tylenol every 8 hours as needed for pain that is not well controlled by naproxen  You may also use Robaxin for muscle tightness but do not drive, operate machinery or drink while on this medication as it may make you drowsy  Return to the emergency department with any significant change or worsening of her symptoms including but not limited to worsening chest pain or shortness of breath

## 2021-07-20 NOTE — ED PROVIDER NOTES
History  Chief Complaint   Patient presents with    Rib Pain     bilateral rib pain that started last evening; denies injury or trauma to same; unrelieved with naproxen; had chostochondritis in the past      67-year-old female history of anxiety and fibromyalgia presents complaining of rib pain  Patient reports that her pain started last night at rest and is bilateral although worse on the left side  She reports that the pain is reproducible and worse with movement  Denies any pleuritic pain or shortness of breath  Denies any recent unilateral leg pain or swelling  Denies any personal or family history of DVT/PE  Prior to Admission Medications   Prescriptions Last Dose Informant Patient Reported? Taking? ALPRAZolam (XANAX) 0 5 mg tablet   No No   Sig: Take 1 tablet (0 5 mg total) by mouth daily as needed for anxiety   ARIPiprazole (ABILIFY) 2 mg tablet  Self No No   Sig: Take 1 tablet (2 mg total) by mouth daily   Adalimumab (Humira Pen) 40 MG/0 4ML PNKT  Self Yes No   Sig: Inject 0 4 mL (40 mg total) under the skin every 14 (fourteen) days  Aimovig 140 MG/ML SOAJ  Self No No   Sig: Inject 140mg under the skin every 30 (thirty) days  B-D 3CC LUER-DEANN SYR 25GX1" 25G X 1" 3 ML MISC  Self Yes No   Syringe/Needle, Disp, (SYRINGE 3CC/56JB8-5/4") 27G X 1-1/4" 3 ML MISC  Self No No   Sig: Use for IM injection of ketorolac    acetaminophen (TYLENOL) 500 mg tablet  Self No No   Sig: Take 2 tablets (1,000 mg total) by mouth every 6 (six) hours as needed for mild pain or moderate pain   buPROPion (WELLBUTRIN XL) 150 mg 24 hr tablet  Self No No   Sig: Take 1 tablet (150 mg total) by mouth daily   dexamethasone (DECADRON) 2 mg tablet  Self No No   Si tab qam with food prn migraine     escitalopram (LEXAPRO) 10 mg tablet  Self No No   Sig: Take 1 tablet (10 mg total) by mouth daily   etonogestrel-ethinyl estradiol (NuvaRing) 0 12-0 015 MG/24HR vaginal ring   No No   Sig: Insert ring for 21 days then remove for one week     furosemide (LASIX) 40 mg tablet   No No   Sig: Take 1 tablet (40 mg total) by mouth daily as needed (Lower extremity edema)   ketorolac (TORADOL) 10 mg tablet  Self No No   Sig: Take 1 tablet (10 mg total) by mouth every 6 (six) hours as needed (migraine) Max 2-3 per week    lidocaine (LIDODERM) 5 % Not Taking at Unknown time Self No No   Sig: Apply 1 patch topically daily Remove & Discard patch within 12 hours or as directed by MD   Patient not taking: Reported on 2021   melatonin 1 mg  Self No No   Si-2 tabs qhs prn insomnia   metoclopramide (REGLAN) 10 mg tablet Not Taking at Unknown time Self No No   Sig: Take 1 tablet (10 mg total) by mouth 3 (three) times a day as needed (headache or nausea)   Patient not taking: Reported on 2021   onabotulinumtoxin A (BOTOX) 100 units  Self Yes No   Sig: Inject as directed   ondansetron (ZOFRAN) 4 mg tablet Not Taking at Unknown time  No No   Sig: Take 1 tablet (4 mg total) by mouth every 8 (eight) hours as needed for nausea or vomiting   Patient not taking: Reported on 2021   phentermine (ADIPEX-P) 37 5 MG tablet Not Taking at Unknown time Self No No   Sig: Take 1 tablet (37 5 mg total) by mouth daily   Patient not taking: Reported on 2021   pregabalin (LYRICA) 25 mg capsule  Self No No   Sig: Take 1 capsule (25 mg total) by mouth 2 (two) times a day   vitamin B-12 (VITAMIN B-12) 1,000 mcg tablet  Self Yes No   Sig: Take 1,000 mcg by mouth daily      Facility-Administered Medications Last Administration Doses Remaining   cyanocobalamin injection 1,000 mcg 8/3/2020  8:59 AM    cyanocobalamin injection 1,000 mcg 2020 11:46 AM    cyanocobalamin injection 1,000 mcg 2020 11:29 AM    onabotulinumtoxin A (BOTOX) injection 200 Units 2021  8:48 AM 0          Past Medical History:   Diagnosis Date    Abnormal Pap smear of cervix     Anxiety     Arthritis     Depression     Fibromyalgia, primary     Migraine     Vitamin B12 deficiency        Past Surgical History:   Procedure Laterality Date    COLONOSCOPY N/A 5/8/2018    Procedure: COLONOSCOPY;  Surgeon: Dominique Garza MD;  Location: [facility]; Service: Gastroenterology    WA EXC SKIN BENIG >4 CM REMAINDR BODY N/A 7/1/2021    Procedure: EXCISION OF PILAR SCALP CYST X 2 AND RIGHT INNER GROIN NEVVUS;  Surgeon: Renetta Mahoney MD;  Location:  MAIN OR;  Service: Plastics    WA RECMPL WND SCALP,EXTR 2 6-7 5 CM N/A 7/1/2021    Procedure: CLOSURE WOUND SCALP X 2 AND RIGHT INNER GROIN;  Surgeon: Renetta Mahoney MD;  Location: [address] [place] [address] MAIN OR;  Service: Plastics    TONSILLECTOMY      WISDOM TOOTH EXTRACTION         Family History   Problem Relation Age of Onset    Rheum arthritis Mother     Psoriasis Mother     Other Mother     Hypertension Mother     Diabetes unspecified Mother     Sjogren's syndrome Mother     Alcohol abuse Mother     Drug abuse Mother     Anxiety disorder Father     Alcohol abuse Father     Cancer Other     Diabetes Other     Other Other         High blood pressure    Depression Maternal Grandmother      I have reviewed and agree with the history as documented  E-Cigarette/Vaping    E-Cigarette Use Former User     Start Date 7/1/19     Comments medical marijuana       E-Cigarette/Vaping Substances    Nicotine No     THC Yes     CBD No     Flavoring No     Other No     Unknown No      Social History     Tobacco Use    Smoking status: Never Smoker    Smokeless tobacco: Never Used   Vaping Use    Vaping Use: Former    Start date: 7/1/2019    Substances: THC   Substance Use Topics    Alcohol use: Yes     Comment: rarely    Drug use: Yes     Types: Marijuana     Comment: Medical marijuana not using        Review of Systems   Constitutional: Negative for chills, fatigue and fever  HENT: Negative for ear pain and sore throat  Eyes: Negative for pain  Respiratory: Negative for cough, shortness of breath and wheezing  Cardiovascular: Negative for chest pain, palpitations and leg swelling  Gastrointestinal: Negative for abdominal pain, constipation, diarrhea, nausea and vomiting  Endocrine: Negative for polyuria  Genitourinary: Negative for dysuria and pelvic pain  Musculoskeletal: Positive for arthralgias  Negative for myalgias, neck pain and neck stiffness  Skin: Negative for rash  Neurological: Negative for dizziness, syncope, light-headedness and headaches  All other systems reviewed and are negative  Physical Exam  Physical Exam  Constitutional:       Appearance: She is well-developed  HENT:      Head: Normocephalic and atraumatic  Cardiovascular:      Rate and Rhythm: Normal rate and regular rhythm  Heart sounds: Normal heart sounds  Pulmonary:      Effort: Pulmonary effort is normal       Breath sounds: Normal breath sounds  Chest:       Abdominal:      General: Bowel sounds are normal       Palpations: Abdomen is soft  Tenderness: There is no abdominal tenderness  Musculoskeletal:         General: Normal range of motion  Cervical back: Normal range of motion  Skin:     General: Skin is warm  Capillary Refill: Capillary refill takes less than 2 seconds  Neurological:      General: No focal deficit present  Mental Status: She is alert and oriented to person, place, and time           Vital Signs  ED Triage Vitals [07/20/21 0947]   Temperature Pulse Respirations Blood Pressure SpO2   98 4 °F (36 9 °C) 99 18 147/83 97 %      Temp Source Heart Rate Source Patient Position - Orthostatic VS BP Location FiO2 (%)   Oral Monitor Sitting Left arm --      Pain Score       7           Vitals:    07/20/21 0947   BP: 147/83   Pulse: 99   Patient Position - Orthostatic VS: Sitting         Visual Acuity      ED Medications  Medications   ketorolac (TORADOL) injection 15 mg (15 mg Intramuscular Given 7/20/21 1024)   acetaminophen (TYLENOL) tablet 975 mg (975 mg Oral Given 7/20/21 1022)   methocarbamol (ROBAXIN) tablet 500 mg (500 mg Oral Given 7/20/21 1022)       Diagnostic Studies  Results Reviewed     Procedure Component Value Units Date/Time    POCT pregnancy, urine [[id number]]  (Normal) Resulted: 07/20/21 1018    Lab Status: Final result Updated: 07/20/21 1018     EXT PREG TEST UR (Ref: Negative) NEGATIVE (LOT# [id number] EXP DATE 2022-10-31)     Control VALID                 No orders to display              Procedures  Procedures         ED Course  ED Course as of Jul 20 1050   Tue Jul 20, 2021   1043 Upon re-evaluation of patient, she is feeling much better  Pain has nearly completely subsided  Patient continues to not have any shortness of breath or pleuritic pain  SBIRT 22yo+      Most Recent Value   SBIRT (22 yo +)   In order to provide better care to our patients, we are screening all of our patients for alcohol and drug use  Would it be okay to ask you these screening questions? Yes Filed at: 07/20/2021 6583   Initial Alcohol Screen: US AUDIT-C    1  How often do you have a drink containing alcohol? 1 Filed at: 07/20/2021 0956   2  How many drinks containing alcohol do you have on a typical day you are drinking? 2 Filed at: 07/20/2021 0956   3a  Male UNDER 65: How often do you have five or more drinks on one occasion? 0 Filed at: 07/20/2021 0956   3b  FEMALE Any Age, or MALE 65+: How often do you have 4 or more drinks on one occassion? 0 Filed at: 07/20/2021 0956   Audit-C Score  3 Filed at: 07/20/2021 8158   BRUCE: How many times in the past year have you    Used an illegal drug or used a prescription medication for non-medical reasons? Never Filed at: 07/20/2021 4053                    MDM  Number of Diagnoses or Management Options  Chest wall pain  Diagnosis management comments: Patient presented with bilateral rib pain that was atraumatic in nature  Doubt x-ray would   Positive breath sounds bilaterally    Doubt pneumothorax  Offered patient labs and D-dimer to assess for possible PE however extremely low suspicion given patient's pain is reproducible on exam   Patient's pain significantly improved in the ED  Recommend NSAIDs and Tylenol  Lengthy discussion with the patient in regards to specific signs and symptoms to watch out for including but not limited to worsening chest pain, shortness of breath, lower leg pain or swelling  All the patient's questions were answered  Patient expressed her understanding of these risks of diagnostic uncertainty without getting labs and CT, was comfortable with plan and very thankful for care  Disposition  Final diagnoses:   Chest wall pain     Time reflects when diagnosis was documented in both MDM as applicable and the Disposition within this note     Time User Action Codes Description Comment    7/20/2021 10:43 AM Rubi Maxwell [R07 [id number]] Chest wall pain       ED Disposition     ED Disposition Condition Date/Time Comment    Discharge Stable Tue Jul 20, 2021 10:43 AM Jovani Russell discharge to home/self care  Follow-up Information    None         Patient's Medications   Discharge Prescriptions    METHOCARBAMOL (ROBAXIN) 500 MG TABLET    Take 1 tablet (500 mg total) by mouth 2 (two) times a day       Start Date: 7/20/2021 End Date: --       Order Dose: 500 mg       Quantity: 4 tablet    Refills: 0     No discharge procedures on file      PDMP Review       Value Time User    PDMP Reviewed  Yes 7/14/2021 11:56 AM Dinesh Carbajal MD          ED Provider  Electronically Signed by           Ary Brooke PA-C  07/20/21 2696

## 2021-07-20 NOTE — PROGRESS NOTES
Universal Protocol   Consent: Verbal consent obtained  Written consent obtained    Risks and benefits: risks, benefits and alternatives were discussed  Consent given by: patient  Patient understanding: patient states understanding of the procedure being performed  Patient consent: the patient's understanding of the procedure matches consent given  Procedure consent: procedure consent matches procedure scheduled        Chemodenervation     Date/Time 7/20/2021 8:45 AM     Performed by  Delrae Hamman, PA-C     Authorized by Delrae Hamman, PA-C        Pre-procedure details      Prepped With: Alcohol     Procedure details     Position:  Upright   Botox     Botox Type:  Type A    Brand:  Botox    mL's of Botulinum Toxin:  185    Final Concentration per CC:  100 units    Needle Gauge:  30 G 2 5 inch   Procedures     Botox Procedures: chronic headache      Indications: migraines     Injection Location      Head / Face:  L superior trapezius, R superior trapezius, L superior cervical paraspinal, R superior cervical paraspinal, L , R , procerus, L temporalis, R temporalis, R frontalis, L frontalis, R medial occipitalis and L medial occipitalis    L  injection amount:  5 unit(s)    R  injection amount:  5 unit(s)    L lateral frontalis:  5 unit(s)    R lateral frontalis:  5 unit(s)    L medial frontalis:  5 unit(s)    R medial frontalis:  5 unit(s)    L temporalis injection amount:  20 unit(s)    R temporalis injection amount:  20 unit(s)    Procerus injection amount:  5 unit(s)    L medial occipitalis injection amount:  15 unit(s)    R medial occipitalis injection amount:  15 unit(s)    L superior cervical paraspinal injection amount:  10 unit(s)    R superior cervical paraspinal injection amount:  10 unit(s)    L superior trapezius injection amount:  0 unit(s)    R superior trapezius injection amount:  0 unit(s)   Total Units     Total units used:  185    Total units discarded:  15 Post-procedure details      Chemodenervation:  Chronic migraine    Facial Nerve Location[de-identified]  Bilateral facial nerve    Patient tolerance of procedure: Tolerated well, no immediate complications   Comments      Extra units medically necessary:  - 30 units between the R and L temporalis muscles/ hair band region  - 20 units scalp t/o  - 5 units left masseter  - 5 units right masseter  Avoided traps b/l  Blood pressure 125/80, pulse 99, temperature 98 3 °F (36 8 °C), not currently breastfeeding

## 2021-07-21 LAB
LAB AP GYN PRIMARY INTERPRETATION: NORMAL
Lab: NORMAL

## 2021-07-23 ENCOUNTER — HOSPITAL ENCOUNTER (OUTPATIENT)
Dept: NON INVASIVE DIAGNOSTICS | Facility: HOSPITAL | Age: 31
Discharge: HOME | End: 2021-07-23
Payer: COMMERCIAL

## 2021-07-23 DIAGNOSIS — R60.0 LOWER EXTREMITY EDEMA: ICD-10-CM

## 2021-07-23 PROCEDURE — 93970 EXTREMITY STUDY: CPT

## 2021-07-23 PROCEDURE — 93970 EXTREMITY STUDY: CPT | Performed by: SURGERY

## 2021-07-23 NOTE — RESULT ENCOUNTER NOTE
Please inform patient pap normal   She is HPV 16 and 18 negative   She did test positive for other high risk hPV   Given this findings and [facility]P recommendation - repeat cotesting in one year thanks   This is a patient of dr Mihaela Guidry I was covering while she was on vacay

## 2021-07-27 ENCOUNTER — OFFICE VISIT (OUTPATIENT)
Dept: PHYSICAL THERAPY | Age: 31
End: 2021-07-27
Payer: COMMERCIAL

## 2021-07-27 DIAGNOSIS — M25.50 ARTHRALGIA OF MULTIPLE SITES: Primary | ICD-10-CM

## 2021-07-27 PROCEDURE — 97113 AQUATIC THERAPY/EXERCISES: CPT | Performed by: PHYSICAL THERAPIST

## 2021-07-27 NOTE — PROGRESS NOTES
Daily Note     Today's date: 2021  Patient name: Margo Ramirez  : 1990  MRN: [id number]  Referring provider: Gabbi Leo PA-C  Dx:   Encounter Diagnosis     ICD-10-CM    1  Arthralgia of multiple sites  M25 50                   Subjective: Went to ER because of b/l rib pain - d/c'd same day      Objective: See treatment diary below      Assessment: Tolerated treatment well  Patient would benefit from continued PT      Plan: Continue per plan of care  Precautions: fibromyalgia, chronic fatigue      Daily Treatment Diary     Manual  5/18 5/25 6/1 6/8 6/15 6/29 7/27                                                                           Exercise Diary                           Walking    x5 x5 x5 x5                                Seated hamstring stretch x4 hold 20 sec  x4 x4 x4 x4 x4 x4      Seated piriformis stretch x4 hold 20 sec  x4 x4 x4 x4 x4 x4                   Standing hip flexion 2x10 2x10 2x10 2x10 2x10 2x12 2x12      Standing hip abd 2x10 2x10 2x10 2x10 2x10 2x12 2x12                   Standing knee flexion 2x15 2x15 2x15 2x15 2x15 2x15 2x15      Step ups 2x10 2x10 2x10 2x10 2x10 2x12 - each 2x12                   Pony cycling x5 minutes x5 x5 x5 x5 x5 x5      Pony cross country skiing x5 minutes x5 x5 x5 x5 x5 x5      Heel raises/toe raises 2x15 2x15 2x15 2x15 2x15 2x15 2x15      Heel-to-toe balance x10 reps each x10 x10 x10 x10 x10 x10      Pony    Hip abd/add - 3x15 3x15 3x15 3x15      Standing quad stretch     x5 reps hold 30 sec  x5 x5                       Modalities                                                                Manuals 6/24 7/13 5/28 6/3 6/17   LC x10 minutes LC - 10 minutes x10 minutes x10 minutes x10 minutes                           Neuro Re-Ed         SLS        Tandem on foam                                                Ther Ex        Supine piriformis stretch x5 x5 reps hold 20 sec   x5 reps x5 reps x5   SLR flexion 2x10 2x10 2x10 Standing today 2x10 SLR abd 2x10 2x10 2x10 Standing today 2x10           cybex leg press 40# 3x10 - 40# 3x10 3x10 3x10   cybex knee flexion 30# 3x10  3x10 3x10 3x10   SLS on blue foam X10 x10 hold 5 sec each x5 x5 x5   Bridging with hip abd 3X10 With yellow TB - 3x10 2x10 3x10 3x10   Cybex hip abd 3 plates - 4Q48 3 sets  2 plates - 9U47 9Z01           Squats on 1/2 roll nt 3x10   2x10   SLS   x10 hold 5 sec                                                Ther Activity                        Gait Training                        Modalities

## 2021-07-30 ENCOUNTER — SOCIAL WORK (OUTPATIENT)
Dept: BEHAVIORAL/MENTAL HEALTH CLINIC | Facility: CLINIC | Age: 31
End: 2021-07-30
Payer: COMMERCIAL

## 2021-07-30 DIAGNOSIS — F33.1 DEPRESSION, MAJOR, RECURRENT, MODERATE (HCC): Primary | Chronic | ICD-10-CM

## 2021-07-30 DIAGNOSIS — F41.1 GENERALIZED ANXIETY DISORDER: Chronic | ICD-10-CM

## 2021-07-30 PROCEDURE — 90834 PSYTX W PT 45 MINUTES: CPT | Performed by: SOCIAL WORKER

## 2021-07-30 NOTE — PSYCH
Psychotherapy Provided: Individual Psychotherapy 45 minutes     Length of time in session: 45 minutes, follow up in 3 weeks    Encounter Diagnosis     ICD-10-CM    1  Depression, major, recurrent, moderate ([facility] [address] )  F33 1    2  Generalized anxiety disorder  F41 1        Goals addressed in session: Goal 1     Pain:      moderate to severe-- physical health pain-- fibromyalgia/AS      Current suicide risk : Low     DATA: Met with Stephanie for scheduled individual session  Topics of discussion included physical health concerns and relationship with SO, relationship with best friend, financial concerns, application to [facility] "I am flaring right now  I had to go to the emergency room last week " Anjelica Srivastava spoke about her medical health concerns  She states that she has been trying to increase her physical activity as much as she is able  She states that she is going to move forward with her re-application for disability  She will be seeking assistance from an  to assist with this process  She reports that she has been to at least one medical appointment each week for quite a while  Anjelica Cook discussed her relationship with her boyfriend  She states, "I almost ended it " She discussed checking his phone and finding a SnapChat friend who is a female that was listed as a "best friend" on his chat list  She states that he said that he "had no recollection of the conversation" with this other person  Anjelica Srivastava states that they fight almost every day  We spent some time discussing the pros/cons of this relationship  Anjelica Srivastava states that she is not happy in this relationship, and she states that she is not sure why she continues to maintain the relationship  This clinician gave her a Wants/Needs/Dealbreakers Worksheet to complete  Stephanie states, "I'm having a little bit of trouble with Latoya Gómez " She went shopping with her friends and her friend's daughters  Her friend made a homophobic comment which was very upsetting to Anjelica Srivastava states, "I don't know how to feel " We spent time discussing her emotions related to this situation  Rubia Espino wants to continue to work on maintaining her relationship with her best friend  Client shows evidence of utilizing Mindfulness-based strategies skills to manage mental health symptoms  During this session, this clinician used the following therapeutic modalities: supportive psychotherapy, client-centered therapy, mindfulness-based strategies, DBT-informed skills, Motivational Interviewing and solution-focused therapy  ASSESSMENT: Rubia Espino presents with a euthymic mood  Her affect is normal range and intensity, appropriate  Rubia Espino exhibits good therapeutic rapport with this clinician  Rubia Espino continues to exhibit willingness to work on treatment goals and objectives  Rubia Espino presents with a minimal risk of suicide, minimal risk of self-harm, and minimal risk of harm to others  PLAN: Rubia Espino will return in three weeks for the next scheduled session  Between sessions, Rubia Espino will work on completing the written assignment, as described above, and will report back during the next session re: successes and barriers  At the next session, this clinician will use supportive psychotherapy, client-centered therapy, mindfulness-based strategies, DBT-informed skills, Motivational Interviewing and solution-focused therapy to address her mood regulation and relationship concerns, in an effort to assist Rubia Espino with meeting treatment goals  Behavioral Health Treatment Plan [facility]: Diagnosis and Treatment Plan explained to Nile Silva relates understanding diagnosis and is agreeable to Treatment Plan   Yes

## 2021-08-02 ENCOUNTER — OFFICE VISIT (OUTPATIENT)
Dept: PHYSICAL THERAPY | Age: 31
End: 2021-08-02
Payer: COMMERCIAL

## 2021-08-02 DIAGNOSIS — M25.50 ARTHRALGIA OF MULTIPLE SITES: Primary | ICD-10-CM

## 2021-08-02 PROCEDURE — 97113 AQUATIC THERAPY/EXERCISES: CPT | Performed by: SPECIALIST/TECHNOLOGIST

## 2021-08-02 NOTE — PROGRESS NOTES
Daily Note     Today's date: 2021  Patient name: Mary Smith  : 1990  MRN: [id number]  Referring provider: Sophia Zhao PA-C  Dx:   Encounter Diagnosis     ICD-10-CM    1  Arthralgia of multiple sites  M25 50                   Subjective: Pt denies pain/soreness following previous treatment session  Objective: See treatment diary below    Assessment: Current POC interventions, reps and resistance remain appropriately challenging at this time- no exacerbation of arthralgias, pt able to increase reps consistently  Tolerated treatment well  Patient demonstrated fatigue post treatment, exhibited good technique with therapeutic exercises and would benefit from continued PT    Plan: Continue per plan of care  Progress treatment as tolerated         Precautions: fibromyalgia, chronic fatigue      Daily Treatment Diary     Manual  5/18 5/25 6/1 6/8 6/15 6/29 7/27 8/2                                                                          Exercise Diary                           Walking    x5 x5 x5 x5 x5                               Seated hamstring stretch x4 hold 20 sec  x4 x4 x4 x4 x4 x4 x4      Seated piriformis stretch x4 hold 20 sec  x4 x4 x4 x4 x4 x4 x4                   Standing hip flexion 2x10 2x10 2x10 2x10 2x10 2x12 2x12 2x15     Standing hip abd 2x10 2x10 2x10 2x10 2x10 2x12 2x12 2x15                  Standing knee flexion 2x15 2x15 2x15 2x15 2x15 2x15 2x15 2x15     Step ups 2x10 2x10 2x10 2x10 2x10 2x12 - each 2x12 2x15                  Pony cycling x5 minutes x5 x5 x5 x5 x5 x5 x5     Pony cross country skiing x5 minutes x5 x5 x5 x5 x5 x5 x5     Heel raises/toe raises 2x15 2x15 2x15 2x15 2x15 2x15 2x15 2x15     Heel-to-toe balance x10 reps each x10 x10 x10 x10 x10 x10 x10     Pony    Hip abd/add - 3x15 3x15 3x15 3x15 3x15     Standing quad stretch     x5 reps hold 30 sec  x5 x5 x5                      Modalities                                                                Manuals 6/24 7/13      LC x10 minutes LC - 10 minutes                              Neuro Re-Ed         SLS        Tandem on foam                                                Ther Ex        Supine piriformis stretch x5 x5 reps hold 20 sec  SLR flexion 2x10 2x10      SLR abd 2x10 2x10              cybex leg press 40# 3x10 - 40#      cybex knee flexion 30# 3x10       SLS on blue foam X10 x10 hold 5 sec each      Bridging with hip abd 3X10 With yellow TB - 3x10      Cybex hip abd 3 plates - 6N25 3 sets              Squats on 1/2 roll nt 3x10      SLS   x10 hold 5 sec                                                Ther Activity                        Gait Training                        Modalities

## 2021-08-03 DIAGNOSIS — M79.7 FIBROMYALGIA: ICD-10-CM

## 2021-08-03 RX ORDER — ARIPIPRAZOLE 2 MG/1
2 TABLET ORAL DAILY
Qty: 30 TABLET | Refills: 2 | Status: SHIPPED | OUTPATIENT
Start: 2021-08-03 | End: 2021-12-10 | Stop reason: SDUPTHER

## 2021-08-04 ENCOUNTER — CONSULT (OUTPATIENT)
Dept: BARIATRICS | Facility: CLINIC | Age: 31
End: 2021-08-04
Payer: COMMERCIAL

## 2021-08-04 VITALS
WEIGHT: 239.7 LBS | RESPIRATION RATE: 16 BRPM | TEMPERATURE: 98.2 F | HEIGHT: 64 IN | BODY MASS INDEX: 40.92 KG/M2 | SYSTOLIC BLOOD PRESSURE: 138 MMHG | DIASTOLIC BLOOD PRESSURE: 90 MMHG | HEART RATE: 112 BPM

## 2021-08-04 DIAGNOSIS — M79.7 FIBROMYALGIA SYNDROME: ICD-10-CM

## 2021-08-04 DIAGNOSIS — F33.1 DEPRESSION, MAJOR, RECURRENT, MODERATE (HCC): Chronic | ICD-10-CM

## 2021-08-04 DIAGNOSIS — E66.01 MORBID OBESITY WITH BMI OF 40.0-44.9, ADULT (HCC): Primary | ICD-10-CM

## 2021-08-04 DIAGNOSIS — R73.01 IFG (IMPAIRED FASTING GLUCOSE): ICD-10-CM

## 2021-08-04 DIAGNOSIS — R00.0 TACHYCARDIA: ICD-10-CM

## 2021-08-04 PROCEDURE — 3008F BODY MASS INDEX DOCD: CPT | Performed by: PHYSICIAN ASSISTANT

## 2021-08-04 PROCEDURE — 99244 OFF/OP CNSLTJ NEW/EST MOD 40: CPT | Performed by: PHYSICIAN ASSISTANT

## 2021-08-04 PROCEDURE — 1036F TOBACCO NON-USER: CPT | Performed by: PHYSICIAN ASSISTANT

## 2021-08-04 NOTE — PATIENT INSTRUCTIONS
Goals: Food log (ie ) www myfitnesspal com,sparkpeople  com,loseit com,calorieking  com,etc  baritastic  No sugary beverages  At least 64oz of water daily  Increase physical activity by 10 minutes daily   Gradually increase physical activity to a goal of 5 days per week for 30 minutes of MODERATE intensity PLUS 2 days per week of FULL BODY resistance training  5-10 servings of fruits and vegetables per day and 25-35 grams of dietary fiber per day, gradually increasing   If choosing starch pick whole grain/complex carbs and keep to 1/2 cup: oatmeal, brown rice, quinoa, whole wheat pasta, potatoes, sweet potato, chickpea noodles, red lentil noodles  Measure all portions: creamers, sugars, cooking oils/butters, condiments, dressings, etc and log calories  Recommend checking lab/EKG coverage before having labs drawn  [id number] calories

## 2021-08-04 NOTE — PROGRESS NOTES
Assessment/Plan: Morbid obesity with BMI of 40 0-44 9, adult ([facility], [place] [facility] )  -Discussed options of HealthyCORE-Intensive Lifestyle Intervention Program, Very Low Calorie Diet-VLCD, Conservative Program, Jessica-En-Y Gastric Bypass and Vertical Sleeve Gastrectomy and the role of weight loss medications   -Initial weight loss goal of 5-10% weight loss for improved health  -has some interest in bariatric surgery, but would like to start with lifestyle changes  -On Wellbutrin  -Has been on phentermine twice in the past, had success in first attempt  Will order EKG, but caution as she is tachycardic today  -Screening labs: CMP, TSH, LP reviewed from 6/1/21 ;consider insulin and A1c  -Patient is interested in pursuing Conservative Program   -Calorie goals, sample menu, portion size guidelines, and food logging reviewed with the patient  Depression, major, recurrent, moderate ([place] [address] )  -continue current tx    Follow up in approximately 6 weeks with Non-Surgical Physician/Advanced Practitioner  Goals:  Food log (ie ) www myfitnesspal com,sparkpeople  com,loseit com,calorieking  com,etc  baritastic  No sugary beverages  At least 64oz of water daily  Increase physical activity by 10 minutes daily   Gradually increase physical activity to a goal of 5 days per week for 30 minutes of MODERATE intensity PLUS 2 days per week of FULL BODY resistance training  5-10 servings of fruits and vegetables per day and 25-35 grams of dietary fiber per day, gradually increasing   If choosing starch pick whole grain/complex carbs and keep to 1/2 cup: oatmeal, brown rice, quinoa, whole wheat pasta, potatoes, sweet potato, chickpea noodles, red lentil noodles  Measure all portions: creamers, sugars, cooking oils/butters, condiments, dressings, etc and log calories  Recommend checking lab/EKG coverage before having labs drawn  9967-3862 calories    Diagnoses and all orders for this visit:    Morbid obesity with BMI of 40 0-44 9, adult ([facility], [place] [facility] )  - Ambulatory referral to Weight Management  -     Hemoglobin A1C; Future  -     Insulin, fasting; Future  -     ECG 12 lead; Future    IFG (impaired fasting glucose)  -     Hemoglobin A1C; Future  -     Insulin, fasting; Future  -     ECG 12 lead; Future    Tachycardia  -     Hemoglobin A1C; Future  -     Insulin, fasting; Future  -     ECG 12 lead; Future    Depression, major, recurrent, moderate (HCC)    Fibromyalgia syndrome        Subjective:   Chief Complaint   Patient presents with    Consult     MWM consult     Patient ID: Melissa Mak  is a 28 y o  female with excess weight/obesity here to pursue weight management  Past Medical History:   Diagnosis Date    Abnormal Pap smear of cervix     Anxiety     Arthritis     Depression     Fibromyalgia, primary     IBS (irritable bowel syndrome)     Migraine     Vitamin B12 deficiency      HPI:  Obesity/Excess Weight:  Severity: Severe  Onset:  Entire life  Modifiers:   herbalife  phentermine  Contributing factors: Medications - Humira   Pain- fibromyalgia/ankylosing sponylosis  Associated symptoms: fatigue, increased joint pain and decreased self esteem    Goals: 180  Hydration: reports not drinking enough water; drinks 2-3 cystal light  Alcohol: special occasion  Exercise: no, but doing PT  Occupation: off due to pain  STOPBANG: per pt negative PSG    B: waffle with pb  S: none  L: cereal (special k) + 2 % + turkey and cheese on white wheat bread  S: none  D: recently pasta  S: not usually    Eats out 2x/week    The following portions of the patient's history were reviewed and updated as appropriate: allergies, current medications, past family history, past medical history, past social history, past surgical history and problem list     Review of Systems   Cardiovascular: Negative  Gastrointestinal: Negative      Psychiatric/Behavioral:        Depression improved with current tx; seeing counseling; denies SI/HI     Objective:    /90 (BP Location: Left arm, Patient Position: Sitting, Cuff Size: Adult)   Pulse (!) 112   Temp 98 2 °F (36 8 °C) (Tympanic)   Resp 16   Ht 5' 3 8" (1 621 m)   Wt 109 kg (239 lb 11 2 oz)   LMP  (LMP Unknown) Comment: NuvaRing  BMI 41 40 kg/m²     Physical Exam  Vitals and nursing note reviewed  Constitutional   General appearance: Abnormal   well developed and morbidly obese  Pulmonary   Respiratory effort: No increased work of breathing or signs of respiratory distress  Abdomen   Abdomen: Abnormal   The abdomen was obese    Psychiatric   Orientation to person, place and time: Normal     Affect: appropriate

## 2021-08-04 NOTE — ASSESSMENT & PLAN NOTE
-Discussed options of HealthyCORE-Intensive Lifestyle Intervention Program, Very Low Calorie Diet-VLCD, Conservative Program, Jessica-En-Y Gastric Bypass and Vertical Sleeve Gastrectomy and the role of weight loss medications   -Initial weight loss goal of 5-10% weight loss for improved health  -has some interest in bariatric surgery, but would like to start with lifestyle changes  -On Wellbutrin  -Has been on phentermine twice in the past, had success in first attempt  Will order EKG, but caution as she is tachycardic today  -Screening labs: CMP, TSH, LP reviewed from 6/1/21 ;consider insulin and A1c  -Patient is interested in pursuing Conservative Program   -Calorie goals, sample menu, portion size guidelines, and food logging reviewed with the patient

## 2021-08-05 ENCOUNTER — APPOINTMENT (OUTPATIENT)
Dept: PHYSICAL THERAPY | Age: 31
End: 2021-08-05
Payer: COMMERCIAL

## 2021-08-10 ENCOUNTER — TELEPHONE (OUTPATIENT)
Dept: PLASTIC SURGERY | Facility: CLINIC | Age: 31
End: 2021-08-10

## 2021-08-10 ENCOUNTER — OFFICE VISIT (OUTPATIENT)
Dept: PHYSICAL THERAPY | Age: 31
End: 2021-08-10
Payer: COMMERCIAL

## 2021-08-10 DIAGNOSIS — M25.50 ARTHRALGIA OF MULTIPLE SITES: Primary | ICD-10-CM

## 2021-08-10 DIAGNOSIS — F41.1 GENERALIZED ANXIETY DISORDER: ICD-10-CM

## 2021-08-10 PROCEDURE — 97113 AQUATIC THERAPY/EXERCISES: CPT | Performed by: PHYSICAL THERAPIST

## 2021-08-10 RX ORDER — BUPROPION HYDROCHLORIDE 150 MG/1
150 TABLET ORAL DAILY
Qty: 30 TABLET | Refills: 2 | Status: SHIPPED | OUTPATIENT
Start: 2021-08-10 | End: 2021-12-10 | Stop reason: SDUPTHER

## 2021-08-10 NOTE — PROGRESS NOTES
Daily Note     Today's date: 8/10/2021  Patient name: Katy Swanson  : 1990  MRN: [id number]  Referring provider: Caden Kirkland PA-C  Dx:   Encounter Diagnosis     ICD-10-CM    1  Arthralgia of multiple sites  M25 50                   Subjective: Patient reports she has been feeling good over the past couple days  Objective: See treatment diary below      Assessment: Tolerated treatment well  Patient would benefit from continued PT      Plan: Progress note during next visit        Precautions: fibromyalgia, chronic fatigue      Daily Treatment Diary     Manual  5/18 5/25 6/1 6/8 6/15 6/29 7/27 8/2 8/10                                                                         Exercise Diary                           Walking    x5 x5 x5 x5 x5 x5                              Seated hamstring stretch x4 hold 20 sec  x4 x4 x4 x4 x4 x4 x4  x5    Seated piriformis stretch x4 hold 20 sec  x4 x4 x4 x4 x4 x4 x4  x5                 Standing hip flexion 2x10 2x10 2x10 2x10 2x10 2x12 2x12 2x15 2x15    Standing hip abd 2x10 2x10 2x10 2x10 2x10 2x12 2x12 2x15 2x15                 Standing knee flexion 2x15 2x15 2x15 2x15 2x15 2x15 2x15 2x15 2x15    Step ups 2x10 2x10 2x10 2x10 2x10 2x12 - each 2x12 2x15 2x15                 Pony cycling x5 minutes x5 x5 x5 x5 x5 x5 x5 x5    Pony cross country skiing x5 minutes x5 x5 x5 x5 x5 x5 x5 x5    Heel raises/toe raises 2x15 2x15 2x15 2x15 2x15 2x15 2x15 2x15 2x15    Heel-to-toe balance x10 reps each x10 x10 x10 x10 x10 x10 x10 x10    Pony    Hip abd/add - 3x15 3x15 3x15 3x15 3x15 3x15    Standing quad stretch     x5 reps hold 30 sec  x5 x5 x5 x5    Ladder squats         3x10    SLS with paddle         x10 reps hold 10 sec                                               Modalities                                                                Manuals       LC x10 minutes LC - 10 minutes                              Neuro Re-Ed         SLS        Tandem on foam Ther Ex        Supine piriformis stretch x5 x5 reps hold 20 sec  SLR flexion 2x10 2x10      SLR abd 2x10 2x10              cybex leg press 40# 3x10 - 40#      cybex knee flexion 30# 3x10       SLS on blue foam X10 x10 hold 5 sec each      Bridging with hip abd 3X10 With yellow TB - 3x10      Cybex hip abd 3 plates - 3P59 3 sets              Squats on 1/2 roll nt 3x10      SLS   x10 hold 5 sec                                                Ther Activity                        Gait Training                        Modalities

## 2021-08-10 NOTE — TELEPHONE ENCOUNTER
LEFT MESSAGE FOR PATIENT TO CALL BACK     WE NEED TO ASK HER IF SHE CAN MOVE HER APPOINTMENT IN [place] OFFICE NEXT AVAILABLE OR IF SHE HAS NO PROBLEMS THEN WE DO NOT HAVE TO SEE HER ANYMORE    ML

## 2021-08-12 ENCOUNTER — APPOINTMENT (OUTPATIENT)
Dept: PHYSICAL THERAPY | Age: 31
End: 2021-08-12
Payer: COMMERCIAL

## 2021-08-16 DIAGNOSIS — F41.1 GENERALIZED ANXIETY DISORDER: ICD-10-CM

## 2021-08-16 RX ORDER — ALPRAZOLAM 0.5 MG/1
0.5 TABLET ORAL DAILY PRN
Qty: 30 TABLET | Refills: 0 | Status: SHIPPED | OUTPATIENT
Start: 2021-08-16 | End: 2021-09-03 | Stop reason: SDUPTHER

## 2021-08-17 ENCOUNTER — OFFICE VISIT (OUTPATIENT)
Dept: PHYSICAL THERAPY | Age: 31
End: 2021-08-17
Payer: COMMERCIAL

## 2021-08-17 ENCOUNTER — DOCUMENTATION (OUTPATIENT)
Dept: NEUROLOGY | Facility: CLINIC | Age: 31
End: 2021-08-17

## 2021-08-17 DIAGNOSIS — M25.50 ARTHRALGIA OF MULTIPLE SITES: Primary | ICD-10-CM

## 2021-08-17 PROCEDURE — 97113 AQUATIC THERAPY/EXERCISES: CPT | Performed by: PHYSICAL THERAPIST

## 2021-08-17 NOTE — PROGRESS NOTES
Daily Note     Today's date: 2021  Patient name: Jerzy Monsalve  : 1990  MRN: [id number]  Referring provider: Alexi Willams PA-C  Dx:   Encounter Diagnosis     ICD-10-CM    1  Arthralgia of multiple sites  M25 50                   Subjective: Patient feeling okay today - no issues with ADL activities - sleeping, driving, dressing, etc  Noted  Objective: See treatment diary below      Assessment: Tolerated treatment well  Patient would benefit from continued PT      Plan: Continue per plan of care        Precautions: fibromyalgia, chronic fatigue      Daily Treatment Diary     Manual  5/18 5/25 6/1 6/8 6/15 6/29 7/27 8/2 8/10 8/17                                                                        Exercise Diary                           Walking    x5 x5 x5 x5 x5 x5 x5                             Seated hamstring stretch x4 hold 20 sec  x4 x4 x4 x4 x4 x4 x4  x5 x5   Seated piriformis stretch x4 hold 20 sec  x4 x4 x4 x4 x4 x4 x4  x5 x5                Standing hip flexion 2x10 2x10 2x10 2x10 2x10 2x12 2x12 2x15 2x15 2x15   Standing hip abd 2x10 2x10 2x10 2x10 2x10 2x12 2x12 2x15 2x15 2x15                Standing knee flexion 2x15 2x15 2x15 2x15 2x15 2x15 2x15 2x15 2x15 2x15   Step ups 2x10 2x10 2x10 2x10 2x10 2x12 - each 2x12 2x15 2x15 2x15                Pony cycling x5 minutes x5 x5 x5 x5 x5 x5 x5 x5 x5   Pony cross country skiing x5 minutes x5 x5 x5 x5 x5 x5 x5 x5 x5   Heel raises/toe raises 2x15 2x15 2x15 2x15 2x15 2x15 2x15 2x15 2x15 2x15   Heel-to-toe balance x10 reps each x10 x10 x10 x10 x10 x10 x10 x10 x10   Pony    Hip abd/add - 3x15 3x15 3x15 3x15 3x15 3x15 3x15   Standing quad stretch     x5 reps hold 30 sec  x5 x5 x5 x5 x5   Ladder squats         3x10 3x10   SLS with paddle         x10 reps hold 10 sec x10                                              Modalities                                                                Manuals       LC x10 minutes LC - 10 minutes Neuro Re-Ed         SLS        Tandem on foam                                                Ther Ex        Supine piriformis stretch x5 x5 reps hold 20 sec  SLR flexion 2x10 2x10      SLR abd 2x10 2x10              cybex leg press 40# 3x10 - 40#      cybex knee flexion 30# 3x10       SLS on blue foam X10 x10 hold 5 sec each      Bridging with hip abd 3X10 With yellow TB - 3x10      Cybex hip abd 3 plates - 4T89 3 sets              Squats on 1/2 roll nt 3x10      SLS   x10 hold 5 sec                                                Ther Activity                        Gait Training                        Modalities

## 2021-08-17 NOTE — PROGRESS NOTES
Authorization required for neuropsychological testing  Authorization submitted on: 8/17/2021  Authorization received on: 8/20/2021  Authorization # [id number]  Dates Valid: 9/13/2021    Prior authorization request/determination scanned into patient chart under media

## 2021-08-20 ENCOUNTER — TELEMEDICINE (OUTPATIENT)
Dept: BEHAVIORAL/MENTAL HEALTH CLINIC | Facility: CLINIC | Age: 31
End: 2021-08-20
Payer: COMMERCIAL

## 2021-08-20 DIAGNOSIS — F41.1 GENERALIZED ANXIETY DISORDER: Chronic | ICD-10-CM

## 2021-08-20 DIAGNOSIS — F33.1 DEPRESSION, MAJOR, RECURRENT, MODERATE (HCC): Primary | Chronic | ICD-10-CM

## 2021-08-20 PROCEDURE — 90834 PSYTX W PT 45 MINUTES: CPT | Performed by: SOCIAL WORKER

## 2021-08-20 NOTE — PSYCH
Virtual Regular Visit    Verification of patient location:    Patient is located in the following state in which I hold an active license [place]      Assessment/Plan:    Problem List Items Addressed This Visit        Other    Generalized anxiety disorder (Chronic)    Depression, major, recurrent, moderate (HCC) - Primary (Chronic)          Goals addressed in session: Goal 1          Reason for visit is No chief complaint on file  Encounter provider APARNA Garcia    Provider located at [address] [place] [id number] [phone]      Recent Visits  No visits were found meeting these conditions  Showing recent visits within past 7 days and meeting all other requirements  Future Appointments  No visits were found meeting these conditions  Showing future appointments within next 150 days and meeting all other requirements       The patient was identified by name and date of birth  Juanita Luis Manuel was informed that this is a telemedicine visit and that the visit is being conducted through[employer] and patient was informed that this is a secure, HIPAA-compliant platform  She agrees to proceed     My office door was closed  No one else was in the room  She acknowledged consent and understanding of privacy and security of the video platform  The patient has agreed to participate and understands they can discontinue the visit at any time  Patient is aware this is a billable service  Marshall Allen is a 28 y o  female  DATA: Met with Stephanie for scheduled individual session  Topics of discussion included relationships with family, physical health concerns and relationship with her SO  "I went to weight management, and I'm eating better and walking more " Anthony Bynum states that she has walked three times this week  She states that she is starting out slowly and building her endurance   Anthony Bynum states that she previously "did not have the urge for food " She states that she was eating "mostly waffles and noodles " At this point, she is working on eating 1100 to 1400 calories per day  We started the discussion regarding ways that she can maintain her level of activity after the weather gets cold  She states, "fall is my time " José Miguel Anderson discussed her recent vacation with her boyfriend  She states that things went fairly well between them  She states that they have been fighting less  "I think the trip really helped " José Miguel Anderson discussed her physical health issues  She states that she plans to get a covid booster as soon as she is able  Stephanie just renewed her medicinal marijuana card  She states that she uses the [facility] for anxiety management and insomnia  She does not want to use a vape pen  She plans to talk with the pharmacist at the dispensary about the options that are available to her  José Miguel Anderson discussed some recent "anxiety attacks " As she described them, it seemed that these episodes might be migraine aura episodes  She will discuss these symptoms with her neurologist  José Miguel Anderson is scheduled for her neuropsych testing on September 13th  She expressed some anxiety about the testing but also expressed that she is glad that she is getting the testing done  Client shows evidence of utilizing emotion regulation skills skills to manage mental health symptoms  During this session, this clinician used the following therapeutic modalities: supportive psychotherapy, client-centered therapy, mindfulness-based strategies, DBT-informed skills, Motivational Interviewing and solution-focused therapy  ASSESSMENT: José Miguel Anderson presents with a normal mood  Her affect is normal range and intensity, appropriate  José Miguel Anderson exhibits good therapeutic rapport with this clinician  José Miguel Anderson continues to exhibit willingness to work on treatment goals and objectives   José Miguel Anderson presents with a minimal risk of suicide, minimal risk of self-harm, and minimal risk of harm to others  PLAN: Micheal Louis will return in two weeks for the next scheduled session  Between sessions, Micheal Louis will continue to increase her physical activity and improve her nutrition  She will report back during the next session re: successes and barriers  At the next session, this clinician will use supportive psychotherapy, client-centered therapy, mindfulness-based strategies, DBT-informed skills, Motivational Interviewing and solution-focused therapy to address her mood regulation, relationship issues, and her healthy lifestyle choices, in an effort to assist Micheal Louis with meeting treatment goals  HPI     Past Medical History:   Diagnosis Date    Abnormal Pap smear of cervix     Anxiety     Arthritis     Depression     Fibromyalgia, primary     IBS (irritable bowel syndrome)     Migraine     Vitamin B12 deficiency        Past Surgical History:   Procedure Laterality Date    COLONOSCOPY N/A 5/8/2018    Procedure: COLONOSCOPY;  Surgeon: Liliam Arevalo MD;  Location: [facility]; Service: Gastroenterology    OK EXC SKIN BENIG >4 CM REMAINDR BODY N/A 7/1/2021    Procedure: EXCISION OF PILAR SCALP CYST X 2 AND RIGHT INNER GROIN NEVVUS;  Surgeon: Ivory Cervantes MD;  Location: [address] [place] [address];  Service: Plastics    OK RECMPL WND SCALP,EXTR 2 6-7 5 CM N/A 7/1/2021    Procedure: CLOSURE WOUND SCALP X 2 AND RIGHT INNER GROIN;  Surgeon: Ivory Cervantes MD;  Location: [address] [place] [address];  Service: Plastics    TONSILLECTOMY      WISDOM TOOTH EXTRACTION         Current Outpatient Medications   Medication Sig Dispense Refill    acetaminophen (TYLENOL) 500 mg tablet Take 2 tablets (1,000 mg total) by mouth every 6 (six) hours as needed for mild pain or moderate pain 100 tablet 0    Adalimumab (Humira Pen) 40 MG/0 4ML PNKT Inject 0 4 mL (40 mg total) under the skin every 14 (fourteen) days   Aimovig 140 MG/ML SOAJ Inject 140mg under the skin every 30 (thirty) days   1 pen 10    ALPRAZolam [place]) 0 5 mg tablet Take 1 tablet (0 5 mg total) by mouth daily as needed for anxiety 30 tablet 0    ARIPiprazole (ABILIFY) 2 mg tablet Take 1 tablet (2 mg total) by mouth daily 30 tablet 2    B-D 3CC LUER-DEANN SYR 25GX1" 25G X 1" 3 ML MISC       buPROPion (WELLBUTRIN XL) 150 mg 24 hr tablet Take 1 tablet (150 mg total) by mouth daily 30 tablet 2    dexamethasone (DECADRON) 2 mg tablet 1 tab qam with food prn migraine  5 tablet 0    escitalopram (LEXAPRO) 10 mg tablet Take 1 tablet (10 mg total) by mouth daily 30 tablet 2    etonogestrel-ethinyl estradiol (NuvaRing) 0 12-0 015 MG/24HR vaginal ring Insert ring for 21 days then remove for one week  3 each 3    furosemide (LASIX) 40 mg tablet Take 1 tablet (40 mg total) by mouth daily as needed (Lower extremity edema) 90 tablet 1    ketorolac (TORADOL) 10 mg tablet Take 1 tablet (10 mg total) by mouth every 6 (six) hours as needed (migraine) Max 2-3 per week   10 tablet 0    lidocaine (LIDODERM) 5 % Apply 1 patch topically daily Remove & Discard patch within 12 hours or as directed by MD (Patient not taking: Reported on 7/20/2021) 6 patch 0    melatonin 1 mg 1-2 tabs qhs prn insomnia 60 tablet 2    methocarbamol (ROBAXIN) 500 mg tablet Take 1 tablet (500 mg total) by mouth 2 (two) times a day (Patient not taking: Reported on 8/4/2021) 4 tablet 0    metoclopramide (REGLAN) 10 mg tablet Take 1 tablet (10 mg total) by mouth 3 (three) times a day as needed (headache or nausea) (Patient not taking: Reported on 7/20/2021) 30 tablet 0    onabotulinumtoxin A (BOTOX) 100 units Inject as directed      ondansetron (ZOFRAN) 4 mg tablet Take 1 tablet (4 mg total) by mouth every 8 (eight) hours as needed for nausea or vomiting (Patient not taking: Reported on 7/20/2021) 20 tablet 0    phentermine (ADIPEX-P) 37 5 MG tablet Take 1 tablet (37 5 mg total) by mouth daily (Patient not taking: Reported on 7/20/2021) 30 tablet 2    pregabalin (LYRICA) 25 mg capsule Take 1 capsule (25 mg total) by mouth 2 (two) times a day 60 capsule 3    Syringe/Needle, Disp, (SYRINGE 3CC/90QC7-4/4") 27G X 1-1/4" 3 ML MISC Use for IM injection of ketorolac  4 each 0    vitamin B-12 (VITAMIN B-12) 1,000 mcg tablet Take 1,000 mcg by mouth daily       Current Facility-Administered Medications   Medication Dose Route Frequency Provider Last Rate Last Admin    cyanocobalamin injection 1,000 mcg  1,000 mcg Intramuscular Q30 Days Erminio Limb, DO   1,000 mcg at 08/03/20 8708    cyanocobalamin injection 1,000 mcg  1,000 mcg Intramuscular Q14 Days Erminio Limb, DO   1,000 mcg at 05/18/20 1146    cyanocobalamin injection 1,000 mcg  1,000 mcg Intramuscular Q30 Days Erminio Limb, DO   1,000 mcg at 09/01/20 1129        Allergies   Allergen Reactions    Desvenlafaxine      Other reaction(s): state of confusion    Valproic Acid Other (See Comments)     Other reaction(s): dilated pupils, "schizi"    Tizanidine Anxiety       Review of Systems    Video Exam    There were no vitals filed for this visit  Physical Exam     I spent 45 minutes directly with the patient during this visit    [address] verbally agrees to participate in [employer]  Pt is aware that [employer] could be limited without vital signs or the ability to perform a full hands-on physical exam  Cruz Roy Cap understands she or the provider may request at any time to terminate the video visit and request the patient to seek care or treatment in person

## 2021-08-23 ENCOUNTER — OFFICE VISIT (OUTPATIENT)
Dept: PHYSICAL THERAPY | Age: 31
End: 2021-08-23
Payer: COMMERCIAL

## 2021-08-23 DIAGNOSIS — M25.50 ARTHRALGIA OF MULTIPLE SITES: Primary | ICD-10-CM

## 2021-08-23 PROCEDURE — 97113 AQUATIC THERAPY/EXERCISES: CPT | Performed by: PHYSICAL THERAPIST

## 2021-08-23 NOTE — PROGRESS NOTES
Daily Note     Today's date: 2021  Patient name: Jerzy Monsalve  : 1990  MRN: [id number]  Referring provider: Alexi Willams PA-C  Dx:   Encounter Diagnosis     ICD-10-CM    1  Arthralgia of multiple sites  M25 50                   Subjective: Patient feeling better though she feels an increase in her [place] Meter is likely the cause  Objective: See treatment diary below      Assessment: Tolerated treatment well  Patient would benefit from continued PT      Plan: Continue per plan of care        Precautions: fibromyalgia, chronic fatigue      Daily Treatment Diary     Manual  8/23 5/25 6/1 6/8 6/15 6/29 7/27 8/2 8/10 8/17                                                                        Exercise Diary                           Walking x5   x5 x5 x5 x5 x5 x5 x5                             Seated hamstring stretch x4 hold 20 sec  x4 x4 x4 x4 x4 x4 x4  x5 x5   Seated piriformis stretch x4 hold 20 sec  x4 x4 x4 x4 x4 x4 x4  x5 x5                Standing hip flexion 2x10 2x10 2x10 2x10 2x10 2x12 2x12 2x15 2x15 2x15   Standing hip abd 2x10 2x10 2x10 2x10 2x10 2x12 2x12 2x15 2x15 2x15                Standing knee flexion 2x15 2x15 2x15 2x15 2x15 2x15 2x15 2x15 2x15 2x15   Step ups 2x10 2x10 2x10 2x10 2x10 2x12 - each 2x12 2x15 2x15 2x15                Pony cycling x5 minutes x5 x5 x5 x5 x5 x5 x5 x5 x5   Pony cross country skiing x5 minutes x5 x5 x5 x5 x5 x5 x5 x5 x5   Heel raises/toe raises 2x15 2x15 2x15 2x15 2x15 2x15 2x15 2x15 2x15 2x15   Heel-to-toe balance x10 reps each x10 x10 x10 x10 x10 x10 x10 x10 x10   Pony 3x15   Hip abd/add - 3x15 3x15 3x15 3x15 3x15 3x15 3x15   Standing quad stretch     x5 reps hold 30 sec  x5 x5 x5 x5 x5   Ladder squats 3x10        3x10 3x10   SLS with paddle x10 reps        x10 reps hold 10 sec x10                                              Modalities                                                                Manuals       LC x10 minutes LC - 10 minutes Neuro Re-Ed         SLS        Tandem on foam                                                Ther Ex        Supine piriformis stretch x5 x5 reps hold 20 sec  SLR flexion 2x10 2x10      SLR abd 2x10 2x10              cybex leg press 40# 3x10 - 40#      cybex knee flexion 30# 3x10       SLS on blue foam X10 x10 hold 5 sec each      Bridging with hip abd 3X10 With yellow TB - 3x10      Cybex hip abd 3 plates - 6H88 3 sets              Squats on 1/2 roll nt 3x10      SLS   x10 hold 5 sec                                                Ther Activity                        Gait Training                        Modalities

## 2021-08-25 ENCOUNTER — APPOINTMENT (OUTPATIENT)
Dept: PHYSICAL THERAPY | Age: 31
End: 2021-08-25
Payer: COMMERCIAL

## 2021-08-30 ENCOUNTER — OFFICE VISIT (OUTPATIENT)
Dept: PHYSICAL THERAPY | Age: 31
End: 2021-08-30
Payer: COMMERCIAL

## 2021-08-30 DIAGNOSIS — M25.50 ARTHRALGIA OF MULTIPLE SITES: Primary | ICD-10-CM

## 2021-08-30 PROCEDURE — 97113 AQUATIC THERAPY/EXERCISES: CPT | Performed by: PHYSICAL THERAPIST

## 2021-08-30 NOTE — PROGRESS NOTES
Daily Note     Today's date: 2021  Patient name: Kathe Kenyon  : 1990  MRN: [id number]  Referring provider: Leilani Gaucher, PA-C  Dx:   Encounter Diagnosis     ICD-10-CM    1  Arthralgia of multiple sites  M25 50                   Subjective: Patient without complaints      Objective: See treatment diary below      Assessment: Tolerated treatment well  Patient would benefit from continued PT      Plan: Continue per plan of care        Precautions: fibromyalgia, chronic fatigue      Daily Treatment Diary     Manual  8/23 8/30 6/1 6/8 6/15 6/29 7/27 8/2 8/10 8/17                                                                        Exercise Diary                           Walking x5   x5 x5 x5 x5 x5 x5 x5                             Seated hamstring stretch x4 hold 20 sec  x4 x4 x4 x4 x4 x4 x4  x5 x5   Seated piriformis stretch x4 hold 20 sec  x4 x4 x4 x4 x4 x4 x4  x5 x5                Standing hip flexion 2x10 2x10 2x10 2x10 2x10 2x12 2x12 2x15 2x15 2x15   Standing hip abd 2x10 2x10 2x10 2x10 2x10 2x12 2x12 2x15 2x15 2x15                Standing knee flexion 2x15 2x15 2x15 2x15 2x15 2x15 2x15 2x15 2x15 2x15   Step ups 2x10 2x10 2x10 2x10 2x10 2x12 - each 2x12 2x15 2x15 2x15                Pony cycling x5 minutes x5 x5 x5 x5 x5 x5 x5 x5 x5   Pony cross [place] skiing x5 minutes x5 x5 x5 x5 x5 x5 x5 x5 x5   Heel raises/toe raises 2x15 2x15 2x15 2x15 2x15 2x15 2x15 2x15 2x15 2x15   Heel-to-toe balance x10 reps each x10 x10 x10 x10 x10 x10 x10 x10 x10   Pony 3x15   Hip abd/add - 3x15 3x15 3x15 3x15 3x15 3x15 3x15   Standing quad stretch     x5 reps hold 30 sec  x5 x5 x5 x5 x5   Ladder squats 3x10 3x10       3x10 3x10   SLS with paddle x10 reps x10 reps       x10 reps hold 10 sec x10                                              Modalities

## 2021-09-01 ENCOUNTER — OFFICE VISIT (OUTPATIENT)
Dept: PHYSICAL THERAPY | Age: 31
End: 2021-09-01
Payer: COMMERCIAL

## 2021-09-01 DIAGNOSIS — M25.50 ARTHRALGIA OF MULTIPLE SITES: Primary | ICD-10-CM

## 2021-09-01 PROCEDURE — 97113 AQUATIC THERAPY/EXERCISES: CPT

## 2021-09-01 NOTE — PROGRESS NOTES
Daily Note     Today's date: 2021  Patient name: Adri Macdonald  : 1990  MRN: [id number]  Referring provider: Tracey Bobo PA-C  Dx:   Encounter Diagnosis     ICD-10-CM    1  Arthralgia of multiple sites  M25 50                   Subjective: Patient states she feels a little sore today and feels it might be due to the weather  Objective: See treatment diary below      Assessment: Tolerated treatment well  Notes feeling improved pain levels as she progresses through exercises with decreased pain levels  Pt appropriately challenged with current POC and has been able to progress as indicated below  Patient would benefit from continued PT      Plan: Continue per plan of care        Precautions: fibromyalgia, chronic fatigue      Daily Treatment Diary     Manual  8/23 8/30 9/1 6/8 6/15 6/29 7/27 8/2 8/10 8/17                                                                        Exercise Diary                           Walking x5  x5 x5 x5 x5 x5 x5 x5 x5                             Seated hamstring stretch x4 hold 20 sec  x4 x4 x4 x4 x4 x4 x4  x5 x5   Seated piriformis stretch x4 hold 20 sec  x4 x4 x4 x4 x4 x4 x4  x5 x5                Standing hip flexion 2x10 2x10 3x20 2x10 2x10 2x12 2x12 2x15 2x15 2x15   Standing hip abd 2x10 2x10 3x20 2x10 2x10 2x12 2x12 2x15 2x15 2x15                Standing knee flexion 2x15 2x15 3x20 2x15 2x15 2x15 2x15 2x15 2x15 2x15   Step ups 2x10 2x10 2x10 2x10 2x10 2x12 - each 2x12 2x15 2x15 2x15                Pony cycling x5 minutes x5 x5 min x5 x5 x5 x5 x5 x5 x5   Pony cross [place] skiing x5 minutes x5 x5 min x5 x5 x5 x5 x5 x5 x5   Heel raises/toe raises 2x15 2x15 2x15 2x15 2x15 2x15 2x15 2x15 2x15 2x15   Heel-to-toe balance x10 reps each x10 x10 x10 x10 x10 x10 x10 x10 x10   Pony 3x15  3x15 Hip abd/add - 3x15 3x15 3x15 3x15 3x15 3x15 3x15   Standing quad stretch   x5  x5 reps hold 30 sec  x5 x5 x5 x5 x5   Ladder squats 3x10 3x10 3x10      3x10 3x10   SLS with paddle x10 reps x10 reps np      x10 reps hold 10 sec x10                                              Modalities

## 2021-09-03 ENCOUNTER — OFFICE VISIT (OUTPATIENT)
Dept: PSYCHIATRY | Facility: CLINIC | Age: 31
End: 2021-09-03
Payer: COMMERCIAL

## 2021-09-03 ENCOUNTER — SOCIAL WORK (OUTPATIENT)
Dept: BEHAVIORAL/MENTAL HEALTH CLINIC | Facility: CLINIC | Age: 31
End: 2021-09-03
Payer: COMMERCIAL

## 2021-09-03 DIAGNOSIS — F33.1 DEPRESSION, MAJOR, RECURRENT, MODERATE (HCC): Primary | Chronic | ICD-10-CM

## 2021-09-03 DIAGNOSIS — F41.1 GENERALIZED ANXIETY DISORDER: Chronic | ICD-10-CM

## 2021-09-03 DIAGNOSIS — F41.1 GENERALIZED ANXIETY DISORDER: ICD-10-CM

## 2021-09-03 PROCEDURE — 90834 PSYTX W PT 45 MINUTES: CPT | Performed by: SOCIAL WORKER

## 2021-09-03 PROCEDURE — 99213 OFFICE O/P EST LOW 20 MIN: CPT | Performed by: PSYCHIATRY & NEUROLOGY

## 2021-09-03 RX ORDER — ALPRAZOLAM 0.5 MG/1
0.5 TABLET ORAL DAILY PRN
Qty: 30 TABLET | Refills: 2 | Status: SHIPPED | OUTPATIENT
Start: 2021-09-03 | End: 2021-12-10 | Stop reason: SDUPTHER

## 2021-09-03 RX ORDER — ESCITALOPRAM OXALATE 10 MG/1
10 TABLET ORAL DAILY
Qty: 30 TABLET | Refills: 2 | Status: SHIPPED | OUTPATIENT
Start: 2021-09-03 | End: 2021-10-21 | Stop reason: SDUPTHER

## 2021-09-03 NOTE — PSYCH
Subjective: Medication Management    Patient ID: Eva Brown is a 28 y o  female  HPI ROS Appetite Changes and Sleep: normal appetite, decreased energy, no weight change and normal number of sleep hours   Temo Mix stated she continues to struggle with anxiety but Alprazolam prn has helped her manage her symptoms  She also tries to incorporate ways to manage stress on her daily routine like taking a walk and doing arts and crafts  She continues to meet with her counselor on a regular basis and working on stress management and coping skills as well  She denies recent health changes or new medications  She agrees to continue current treatment as prescribed and will schedule follow up in 3 months or sooner if needed  Review Of Systems:     Mood Anxiety and Depression   Behavior Normal    Thought Content Disturbing Thoughts, Feelings   General Emotional Problems and Decreased Functioning   Personality Normal   Other Psych Symptoms Normal   Constitutional Negative   ENT Negative   Cardiovascular Negative   Respiratory Negative   Gastrointestinal Negative   Genitourinary Negative   Musculoskeletal Negative   Integumentary Negative   Neurological Negative   Endocrine Normal    Other Symptoms Normal              Laboratory Results: No results found for this or any previous visit      Substance Abuse History:  Social History     Substance and Sexual Activity   Drug Use Yes    Types: Marijuana    Comment: Medical marijuana not using        Family Psychiatric History:   Family History   Problem Relation Age of Onset    Rheum arthritis Mother     Psoriasis Mother     Other Mother     Hypertension Mother     Diabetes unspecified Mother     Sjogren's syndrome Mother     Alcohol abuse Mother     Drug abuse Mother     Anxiety disorder Father     Alcohol abuse Father     Lung cancer Maternal Grandfather     Cancer Other         bone    Diabetes Other     Other Other         High blood pressure    Depression Maternal Grandmother        The following portions of the patient's history were reviewed and updated as appropriate: allergies, current medications, past family history, past medical history, past social history, past surgical history and problem list     Social History     Socioeconomic History    Marital status: Single     Spouse name: Not on file    Number of children: 0    Years of education: 15    Highest education level: Not on file   Occupational History    Occupation: Unemployed     Employer: [employer]   Tobacco Use    Smoking status: Never Smoker    Smokeless tobacco: Never Used   Vaping Use    Vaping Use: Former    Start date: 7/1/2019    Substances: THC   Substance and Sexual Activity    Alcohol use: Yes     Comment: rarely    Drug use: Yes     Types: Marijuana     Comment: Medical marijuana not using     Sexual activity: Yes     Partners: Male     Comment: Nuva ring   Other Topics Concern    Not on file   Social History Narrative    Home:  Living with mom and MGM        Education:    Pt denies any h/o learning disability Dxs but admits to having great difficulty in math requiring a   She reached childhood milestones on time as far as he knows  Graduated HS 2009    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 college art classes--she stopped due to anxiety from dissatisfaction with her classes--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 felt they were telling her what to create  On reflection, she feels it was moreso her anxiety as the cause of her leaving school, and she was using the other reason as an excuse so she would not have to admit to anxiety at that time  She is now very open about her anxiety and does not mind that I have this information in the general social section of her chart       Social Determinants of Health     Financial Resource Strain: High Risk    Difficulty of Paying Living Expenses: Very hard   Food Insecurity: Food Insecurity Present    Worried About Running Out of Food in the Last Year: Often true    Ferm[place] of Food in the Last Year: Never true   Transportation Needs: No Transportation Needs    Lack of Transportation (Medical): No    Lack of Transportation (Non-Medical): No   Physical Activity: Unknown    Days of Exercise per Week: 0 days    Minutes of Exercise per Session: Not on file   Stress:     Feeling of Stress :    Social Connections:     Frequency of Communication with Friends and Family:     Frequency of Social Gatherings with Friends and Family:     Attends Worship Services:     Active Member of Clubs or Organizations:     Attends Club or Organization Meetings:     Marital Status:    Intimate Partner Violence:     Fear of Current or Ex-Partner:     Emotionally Abused:     Physically Abused:     Sexually Abused:      Social History     Social History Narrative    Home:  Living with mom and MGM        Education:    Pt denies any h/o learning disability Dxs but admits to having great difficulty in math requiring a   She reached childhood milestones on time as far as he knows  Graduated HS 2009    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 college art classes--she stopped due to anxiety from dissatisfaction with her classes--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 felt they were telling her what to create  On reflection, she feels it was moreso her anxiety as the cause of her leaving school, and she was using the other reason as an excuse so she would not have to admit to anxiety at that time  She is now very open about her anxiety and does not mind that I have this information in the general social section of her chart         Objective:       Mental status:  Appearance calm and cooperative , adequate hygiene and grooming and good eye contact    Mood dysphoric and depressed   Affect affect was constricted   Speech a normal rate and fluent   Thought Processes coherent/organized and normal thought processes   Hallucinations no hallucinations present    Thought Content no delusions   Abnormal Thoughts somatic preoccupation, no suicidal thoughts  and no homicidal thoughts    Orientation  oriented to person and place and time   Remote Memory short term memory impaired   Attention Span concentration impaired   Intellect Appears to be of Average Intelligence   Insight Limited insight   Judgement judgment was limited   Muscle Strength Muscle strength and tone were normal and Normal gait    Language no difficulty naming common objects and no difficulty repeating a phrase    Fund of Knowledge displays adequate knowledge of current events               Assessment/Plan:       Diagnoses and all orders for this visit:    Depression, major, recurrent, moderate (HCC)    Generalized anxiety disorder  -     ALPRAZolam (XANAX) 0 5 mg tablet; Take 1 tablet (0 5 mg total) by mouth daily as needed for anxiety  -     escitalopram (LEXAPRO) 10 mg tablet; Take 1 tablet (10 mg total) by mouth daily            Treatment Recommendations- Risks Benefits      Immediate Medical/Psychiatric/Psychotherapy Treatments and Any Precautions: continue current treatment     Risks, Benefits And Possible Side Effects Of Medications:  {PSYCH RISK, BENEFITS AND POSSIBLE SIDE EFFECTS (Optional):69414    Controlled Medication Discussion: Discussed with patient Black Box warning on concurrent use of benzodiazepines and opioid medications including sedation, respiratory depression, coma and death  Patient understands the risk of treatment with benzodiazepines in addition to opioids and wants to continue taking those medications  , Discussed with patient the risks of sedation, respiratory depression, impairment of ability to drive and potential for abuse and addiction related to treatment with benzodiazepine medications   The patient understands risk of treatment with benzodiazepine medications, agrees to not drive if feels impaired and agrees to take medications as prescribed  and The patient has been filling controlled prescriptions on time as prescribed to [facility]       Psychotherapy Provided:     Individual psychotherapy provided: Yes  Counseling was provided during the session today for 16 minutes  Medications, treatment progress and treatment plan reviewed with Cruz  Medication education provided to Rina arsenio  Goals discussed during in session: improve control of anxiety and improve control of depression  Recent stressor including COVID-19 issues, relationship problems, health issues, medical problems, limited support, social difficulties, everyday stressors, ongoing anxiety and chronic mental illness discussed with Cruz  Coping strategies including compliance with medications, contacting a therapist, deep/slow breathing, eliminating avoidance, engaging in previously avoided activities, exercising, getting into a good routine, improving self-esteem, increasing energy, increasing interest in usual activities, increasing motivation, increasing social interaction, keeping busy at home, maintain healthy diet, maintain heathy sleeping hygiene and maintain positive attitude reviewed with Cruz  Importance of medication and treatment compliance reviewed with Cruz  Educated on importance of medication and treatment compliance  Importance of follow up with family physician for medical issues reviewed with Cruz  Discussed with Cruz acceptance of mental illness diagnosis and need for ongoing psychiatric treatment  Supportive therapy provided

## 2021-09-03 NOTE — PSYCH
Psychotherapy Provided: Individual Psychotherapy 45 minutes     Length of time in session: 45 minutes, follow up in 2 weeks    Encounter Diagnosis     ICD-10-CM    1  Depression, major, recurrent, moderate ([facility] [address] )  F33 1    2  Generalized anxiety disorder  F41 1        Goals addressed in session: Goal 1     Pain:      moderate to severe-- ongoing physical health concerns that exacerbate depression and anxiety    Current suicide risk : Low     DATA: Met with Stephanie for scheduled individual session  Topics of discussion included family stressors, relationships with family, physical health concerns and relationship with SO  Stephanie discussed some of her physical health concerns, as well as her relationship issues with her partner  She states that she recently received some lab results that were concerning to her  We reviewed them and agreed that it is best for her to wait to review and ask questions of her medical provider  She states that she has a lot of questions about these test results, as they indicate that she might have an STI, which she was unaware of  She states that her SO was initially unsupportive of her, but later he was helping her to do some research and was more supportive and comforting to her  When I asked about her thoughts regarding this relationship, she states, "I'm 50/50 " She states that she does not like when he is not supportive of her  She described his behaviors, and states that he "makes comments and turns to face the wall " She states that she becomes upset and then he does not respond to her  She states that she wants a partner who will be supportive of her when she is struggling with her pain and her medical concerns  She has not yet completed the wants/needs/deal-breakers worksheet  She agreed to do so prior to the next session  She will keep me informed of any new information regarding her labs, and she will reach out if she needs additional support   Client shows evidence of utilizing Mindfulness-based strategies, emotion regulation skills and distress tolerance skills skills to manage mental health symptoms  During this session, this clinician used the following therapeutic modalities: supportive psychotherapy, client-centered therapy, mindfulness-based strategies, DBT-informed skills, Motivational Interviewing and solution-focused therapy  ASSESSMENT: Bola Willett presents with a somewhat dysthymic mood  Her affect is normal range and intensity, appropriate  Bola Willett exhibits good therapeutic rapport with this clinician  Bola Willett continues to exhibit willingness to work on treatment goals and objectives  Bola Willett presents with a minimal risk of suicide, minimal risk of self-harm, and minimal risk of harm to others  PLAN: Bola Willett will return in two weeks for the next scheduled session  Between sessions, Bola Willett will continue to monitor her moods  She will follow up with medical providers regarding her recent lab tests and will follow through with medical recommendations  She will complete previously assigned worksheet and will report back during the next session re: successes and barriers  At the next session, this clinician will use supportive psychotherapy, client-centered therapy, mindfulness-based strategies, DBT-informed skills, Motivational Interviewing and solution-focused therapy to address her mood regulation and relationship concerns, in an effort to assist Bola Willett with meeting treatment goals  Behavioral Health Treatment Plan [facility]: Diagnosis and Treatment Plan explained to Torey Orozco relates understanding diagnosis and is agreeable to Treatment Plan   Yes

## 2021-09-07 ENCOUNTER — OFFICE VISIT (OUTPATIENT)
Dept: OBGYN CLINIC | Facility: MEDICAL CENTER | Age: 31
End: 2021-09-07
Payer: COMMERCIAL

## 2021-09-07 VITALS
DIASTOLIC BLOOD PRESSURE: 90 MMHG | HEIGHT: 63 IN | WEIGHT: 242 LBS | BODY MASS INDEX: 42.88 KG/M2 | SYSTOLIC BLOOD PRESSURE: 130 MMHG

## 2021-09-07 DIAGNOSIS — B00.9 HSV-2 (HERPES SIMPLEX VIRUS 2) INFECTION: Primary | ICD-10-CM

## 2021-09-07 PROCEDURE — 99214 OFFICE O/P EST MOD 30 MIN: CPT | Performed by: OBSTETRICS & GYNECOLOGY

## 2021-09-07 RX ORDER — VALACYCLOVIR HYDROCHLORIDE 500 MG/1
500 TABLET, FILM COATED ORAL DAILY
Qty: 90 TABLET | Refills: 2 | Status: SHIPPED | OUTPATIENT
Start: 2021-09-07 | End: 2022-03-15

## 2021-09-07 NOTE — PATIENT INSTRUCTIONS
Genital Herpes Simplex   WHAT YOU NEED TO KNOW:   What is genital herpes? Genital herpes is a sexually transmitted infection (STI) that is caused by the herpes simplex virus (HSV)  It is spread through oral, vaginal, or anal sex  It may be spread even if you do not see blisters  It can also be spread to other areas of your body, including your eyes, by touching open blisters  If you are pregnant, it may be spread to your baby while he is still in your womb or during vaginal delivery  Unprotected sex or sex with multiple partners increases your risk for genital herpes  What are the signs and symptoms of genital herpes? The most common symptoms are blisters that appear on your genital area, thighs, or buttocks  The blisters will open, leak fluid, and then dry up (crust over)  Usually these sores will go away without leaving a scar  Other symptoms may include any of the following:  · Redness, burning, itching, or tingling in your genital area    · Fever or chills    · Headache, body weakness, or muscle pains    · Swollen lymph nodes in your groin    · Sore throat or loss of appetite    · Fluid or blood leaking from your vagina    · Pain when you urinate    How is genital herpes diagnosed? Your healthcare provider will ask about your health history and examine you  He or she will need to know when your symptoms started  Tell your provider about any STIs you or your partners may have  You may also need any of the following:  · Blood tests  may show the HSV  You may also have this test if you have no symptoms but have a partner with genital herpes  · A fluid sample from a blister  may show the HSV  How is genital herpes treated? There is no cure for genital herpes  You may need any of the following:  · Antivirals  may help decrease your symptoms  · Numbing cream or ointment  may help decrease pain  · NSAIDs , such as ibuprofen, help decrease swelling, pain, and fever   This medicine is available with or without a doctor's order  NSAIDs can cause stomach bleeding or kidney problems in certain people  If you take blood thinner medicine, always ask your healthcare provider if NSAIDs are safe for you  Always read the medicine label and follow directions  How can I manage my symptoms? Do the following to be more comfortable when your infection is active:  · Keep the blisters clean and dry  Wash them with soap and warm water, and pat dry gently  · Wear cotton underwear and loose clothing  This may help to keep the blisters dry and keep clothes from rubbing  · Apply ice  on the area for 15 to 20 minutes every hour or as directed  Use an ice pack, or put crushed ice in a plastic bag  Cover it with a towel  Ice helps prevent tissue damage and decreases swelling and pain  · Apply heat  on the area for 20 to 30 minutes every 2 hours for as many days as directed  A warm bath may also help  Heat helps decrease pain and muscle spasms  How can I prevent the spread of genital herpes? · Use condoms  Use a latex condom when you have oral, genital, and anal sex  Use a new condom each time  Use a polyurethane condom if you are allergic to latex  · Try not to touch your blisters  Wash your hands before and after you touch the area  Do not kiss anyone if you have blisters around your mouth  Do not breastfeed if you have blisters on your breast      · Tell your partners  that you have genital herpes  Do not have sex until he or she knows that you have genital herpes  Ask your healthcare provider for ways to tell partners about your infection  · Tell your healthcare providers  that you have genital herpes  If you are pregnant, your baby may need special monitoring  Inform your healthcare provider of your condition to avoid spreading the infection to your baby  Call 911 for any of the following:   · You have trouble breathing  · You have a seizure  · Your neck is stiff      · You have trouble thinking clearly  When should I contact my healthcare provider? · You have chills or a fever  · You have painful blisters on your penis, vagina, anus, or mouth  · Fluid or blood is coming out of your genitals  · You have trouble urinating  · You think you are pregnant and you are bleeding from your vagina  · You have trouble chewing or swallowing  · Your symptoms do not get better, or they get worse, even after treatment  · You have questions or concerns about your condition or care  CARE AGREEMENT:   You have the right to help plan your care  Learn about your health condition and how it may be treated  Discuss treatment options with your healthcare providers to decide what care you want to receive  You always have the right to refuse treatment  The above information is an  only  It is not intended as medical advice for individual conditions or treatments  Talk to your doctor, nurse or pharmacist before following any medical regimen to see if it is safe and effective for you  © Copyright [employer] 2021 Information is for End User's use only and may not be sold, redistributed or otherwise used for commercial purposes   All illustrations and images included in CareNotes® are the copyrighted property of [employer]  or [address] [place] [address] [email or web address]

## 2021-09-07 NOTE — PROGRESS NOTES
Assessment Diagnoses and all orders for this visit:    HSV-2 (herpes simplex virus 2) infection  -     valACYclovir (VALTREX) 500 mg tablet; Take 1 tablet (500 mg total) by mouth daily  -     HSV 1/2 IgM and Type Specific IgG; Future         Plan  Reviewed STD lab work  Patient and partner report having had no symptoms  Partner reports he has no insurance and has not been tested  Recommendation made for partner to get tested  Discussed management based on lab results  It appears that patient has had a recent infection to HSV 1 and 2, based on +IgM  Discussed initiation of Valtrex to reduce transmission of HSV  Also discussed repeating the HSV 1 and 2 IgG in the next 3 months for confirmation  Patient and partner with many questions as to how she would have recently contracted HSV since they have been exclusively together for greater than one year  Rec f/u once pt gets repeat lab work HSV IgM and IgG and partner gets tested for further discussion  All questions answered  I spent over 42 minutes with the patient in counseling, review of records, and coordination of care  Jose Ramon Alcantara is a 28 y o  female here for a f/u visit  Patient presents with her partner of 1 year  They both report they have had no other partners  Patient reports that she had STD testing for general screening  She reports that she denies any sort of symptoms  Patient denies any sort outbreaks or ulcers      Patient Active Problem List   Diagnosis    Generalized anxiety disorder    Migraine headache    Cervical radiculitis    Hyperlipidemia    Lumbar radiculopathy    Seasonal allergies    Vitamin B12 deficiency    Vitamin D deficiency    Anterior chest wall pain    Chronic migraine without aura without status migrainosus, not intractable    Alternating constipation and diarrhea    Abnormal bowel habits    Chronic fatigue    Memory loss    Cognitive decline    Migraine with aura and with status migrainosus    Possible pregnancy    Dental infection    Upper respiratory infection    Sleep disturbance    Snoring    Fibromyalgia syndrome    Obesity (BMI 30-39  9)    Intractable migraine with aura without status migrainosus    Hand dermatitis    URI (upper respiratory infection)    Depression, major, recurrent, moderate (HCC)    Irritant contact dermatitis due to oils    Chronic heel pain, left    Other complicated headache syndrome    Insomnia due to medical condition    Well adult exam    Paresthesias    Pharyngitis due to Streptococcus species    Elevated blood pressure reading    Encephalopathy    Pilar cysts    Dysplastic nevi    Arthralgia of multiple sites    Chronic low back pain    Elevated C-reactive protein    Inflammatory spondylopathy (HCC)    Knee pain    Cystitis    Lower extremity edema    TMJ pain dysfunction syndrome    Morbid obesity with BMI of 40 0-44 9, adult [facility])       Gynecologic History  No LMP recorded  (Menstrual status: Birth Control)  The current method of family planning is NuvaRing vaginal inserts  Past Medical History:   Diagnosis Date    Abnormal Pap smear of cervix     Anxiety     Arthritis     Depression     Fibromyalgia, primary     IBS (irritable bowel syndrome)     Migraine     Vitamin B12 deficiency      Past Surgical History:   Procedure Laterality Date    COLONOSCOPY N/A 5/8/2018    Procedure: COLONOSCOPY;  Surgeon: Sofi Paiz MD;  Location: [facility] GI LAB;   Service: Gastroenterology    SC EXC SKIN BENIG >4 CM REMAINDR BODY N/A 7/1/2021    Procedure: EXCISION OF PILAR SCALP CYST X 2 AND RIGHT INNER GROIN NEVVUS;  Surgeon: Luis Carlos Lew MD;  Location: [facility] MAIN OR;  Service: Plastics    SC RECMPL WND SCALP,EXTR 2 6-7 5 CM N/A 7/1/2021    Procedure: CLOSURE WOUND SCALP X 2 AND RIGHT INNER GROIN;  Surgeon: Luis Carlos Lew MD;  Location:  MAIN OR;  Service: Plastics    TONSILLECTOMY      WISDOM TOOTH EXTRACTION Family History   Problem Relation Age of Onset    Rheum arthritis Mother     Psoriasis Mother     Other Mother     Hypertension Mother     Diabetes unspecified Mother     Sjogren's syndrome Mother     Alcohol abuse Mother     Drug abuse Mother     Anxiety disorder Father     Alcohol abuse Father     Lung cancer Maternal Grandfather     Cancer Other         bone    Diabetes Other     Other Other         High blood pressure    Depression Maternal Grandmother      Social History     Socioeconomic History    Marital status: Single     Spouse name: Not on file    Number of children: 0    Years of education: 15    Highest education level: Not on file   Occupational History    Occupation: Unemployed     Employer: [employer]   Tobacco Use    Smoking status: Never Smoker    Smokeless tobacco: Never Used   Vaping Use    Vaping Use: Former    Start date: 7/1/2019    Substances: THC   Substance and Sexual Activity    Alcohol use: Yes     Comment: rarely    Drug use: Yes     Types: Marijuana     Comment: Medical marijuana not using     Sexual activity: Yes     Partners: Male     Comment: Nuva ring   Other Topics Concern    Not on file   Social History Narrative    Home:  Living with mom and MGM        Education:    Pt denies any h/o learning disability Dxs but admits to having great difficulty in math requiring a   She reached childhood milestones on time as far as he knows  Graduated HS 2009    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 college art classes--she stopped due to anxiety from dissatisfaction with her classes--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 felt they were telling her what to create  On reflection, she feels it was moreso her anxiety as the cause of her leaving school, and she was using the other reason as an excuse so she would not have to admit to anxiety at that time    She is now very open about her anxiety and does not mind that I have this information in the general social section of her chart  Social Determinants of Health     Financial Resource Strain: High Risk    Difficulty of Paying Living Expenses: Very hard   Food Insecurity: Food Insecurity Present    Worried About Running Out of Food in the Last Year: Often true    Fermín of Food in the Last Year: Never true   Transportation Needs: No Transportation Needs    Lack of Transportation (Medical): No    Lack of Transportation (Non-Medical): No   Physical Activity: Unknown    Days of Exercise per Week: 0 days    Minutes of Exercise per Session: Not on file   Stress:     Feeling of Stress :    Social Connections:     Frequency of Communication with Friends and Family:     Frequency of Social Gatherings with Friends and Family:     Attends Scientologist Services:     Active Member of Clubs or Organizations:     Attends Club or Organization Meetings:     Marital Status:    Intimate Partner Violence:     Fear of Current or Ex-Partner:     Emotionally Abused:     Physically Abused:     Sexually Abused: Allergies   Allergen Reactions    Desvenlafaxine      Other reaction(s): state of confusion    Valproic Acid Other (See Comments)     Other reaction(s): dilated pupils, "schizi"    Tizanidine Anxiety       Current Outpatient Medications:     acetaminophen (TYLENOL) 500 mg tablet, Take 2 tablets (1,000 mg total) by mouth every 6 (six) hours as needed for mild pain or moderate pain, Disp: 100 tablet, Rfl: 0    Adalimumab (Humira Pen) 40 MG/0 4ML PNKT, Inject 0 4 mL (40 mg total) under the skin every 14 (fourteen) days  , Disp: , Rfl:     Aimovig 140 MG/ML SOAJ, Inject 140mg under the skin every 30 (thirty) days  , Disp: 1 pen, Rfl: 10    ALPRAZolam (XANAX) 0 5 mg tablet, Take 1 tablet (0 5 mg total) by mouth daily as needed for anxiety, Disp: 30 tablet, Rfl: 2    ARIPiprazole (ABILIFY) 2 mg tablet, Take 1 tablet (2 mg total) by mouth daily, Disp: 30 tablet, Rfl: 2    B-D 3CC LUER-DEANN SYR 25GX1" 25G X 1" 3 ML MISC, , Disp: , Rfl:     buPROPion (WELLBUTRIN XL) 150 mg 24 hr tablet, Take 1 tablet (150 mg total) by mouth daily, Disp: 30 tablet, Rfl: 2    dexamethasone (DECADRON) 2 mg tablet, 1 tab qam with food prn migraine  , Disp: 5 tablet, Rfl: 0    escitalopram (LEXAPRO) 10 mg tablet, Take 1 tablet (10 mg total) by mouth daily, Disp: 30 tablet, Rfl: 2    etonogestrel-ethinyl estradiol (NuvaRing) 0 12-0 015 MG/24HR vaginal ring, Insert ring for 21 days then remove for one week , Disp: 3 each, Rfl: 3    furosemide (LASIX) 40 mg tablet, Take 1 tablet (40 mg total) by mouth daily as needed (Lower extremity edema), Disp: 90 tablet, Rfl: 1    ketorolac (TORADOL) 10 mg tablet, Take 1 tablet (10 mg total) by mouth every 6 (six) hours as needed (migraine) Max 2-3 per week , Disp: 10 tablet, Rfl: 0    melatonin 1 mg, 1-2 tabs qhs prn insomnia, Disp: 60 tablet, Rfl: 2    onabotulinumtoxin A (BOTOX) 100 units, Inject as directed, Disp: , Rfl:     pregabalin (LYRICA) 25 mg capsule, Take 1 capsule (25 mg total) by mouth 2 (two) times a day, Disp: 60 capsule, Rfl: 3    Syringe/Needle, Disp, (SYRINGE 3CC/51MI3-1/4") 27G X 1-1/4" 3 ML MISC, Use for IM injection of ketorolac , Disp: 4 each, Rfl: 0    valACYclovir (VALTREX) 500 mg tablet, Take 1 tablet (500 mg total) by mouth daily, Disp: 90 tablet, Rfl: 2    vitamin B-12 (VITAMIN B-12) 1,000 mcg tablet, Take 1,000 mcg by mouth daily, Disp: , Rfl:     Current Facility-Administered Medications:     cyanocobalamin injection 1,000 mcg, 1,000 mcg, Intramuscular, Q30 Days, Contreras Salts, DO, 1,000 mcg at 08/03/20 0374    cyanocobalamin injection 1,000 mcg, 1,000 mcg, Intramuscular, Q14 Days, Contreras Salts, DO, 1,000 mcg at 05/18/20 1146    cyanocobalamin injection 1,000 mcg, 1,000 mcg, Intramuscular, Q30 Days, Contreras Salts, DO, 1,000 mcg at 09/01/20 1129    Review of Systems  Constitutional :no fever, feels well, no tiredness, no recent weight gain or loss  ENT: no ear ache, no loss of hearing, no nosebleeds or nasal discharge, no sore throat or hoarseness  Cardiovascular: no complaints of slow or fast heart beat, no chest pain, no palpitations, no leg claudication or lower extremity edema  Respiratory: no complaints of shortness of shortness of breath, no MAURO  Breasts:no complaints of breast pain, breast lump, or nipple discharge  Gastrointestinal: no complaints of abdominal pain, constipation, nausea, vomiting, or diarrhea or bloody stools  Genitourinary : no complaints of dysuria, incontinence, pelvic pain, no dysmenorrhea, vaginal discharge or abnormal vaginal bleeding and as noted in HPI  Musculoskeletal: no complaints of arthralgia, no myalgia, no joint swelling or stiffness, no limb pain or swelling  Integumentary: no complaints of skin rash or lesion, itching or dry skin  Neurological: no complaints of headache, no confusion, no numbness or tingling, no dizziness or fainting     Objective     /90 (BP Location: Right arm, Patient Position: Sitting, Cuff Size: Standard)   Ht 5' 3" (1 6 m)   Wt 110 kg (242 lb)   BMI 42 87 kg/m²     General Appears stated age, cooperative, alert normal mood and affect   Psychiatric oriented to person, place and time    Mood and affect normal

## 2021-09-08 ENCOUNTER — APPOINTMENT (OUTPATIENT)
Dept: PHYSICAL THERAPY | Age: 31
End: 2021-09-08
Payer: COMMERCIAL

## 2021-09-09 ENCOUNTER — HOSPITAL ENCOUNTER (EMERGENCY)
Facility: HOSPITAL | Age: 31
Discharge: HOME | End: 2021-09-09
Attending: EMERGENCY MEDICINE | Admitting: EMERGENCY MEDICINE
Payer: COMMERCIAL

## 2021-09-09 VITALS
RESPIRATION RATE: 20 BRPM | BODY MASS INDEX: 42.88 KG/M2 | TEMPERATURE: 98.1 F | OXYGEN SATURATION: 96 % | DIASTOLIC BLOOD PRESSURE: 103 MMHG | HEART RATE: 97 BPM | WEIGHT: 242 LBS | HEIGHT: 63 IN | SYSTOLIC BLOOD PRESSURE: 152 MMHG

## 2021-09-09 DIAGNOSIS — L72.9 SCALP CYST: Primary | ICD-10-CM

## 2021-09-09 PROCEDURE — 10060 I&D ABSCESS SIMPLE/SINGLE: CPT | Performed by: PHYSICIAN ASSISTANT

## 2021-09-09 PROCEDURE — 99284 EMERGENCY DEPT VISIT MOD MDM: CPT | Performed by: PHYSICIAN ASSISTANT

## 2021-09-09 PROCEDURE — 99282 EMERGENCY DEPT VISIT SF MDM: CPT

## 2021-09-09 RX ORDER — LIDOCAINE HYDROCHLORIDE AND EPINEPHRINE 10; 10 MG/ML; UG/ML
10 INJECTION, SOLUTION INFILTRATION; PERINEURAL ONCE
Status: COMPLETED | OUTPATIENT
Start: 2021-09-09 | End: 2021-09-09

## 2021-09-09 RX ADMIN — LIDOCAINE HYDROCHLORIDE,EPINEPHRINE BITARTRATE 10 ML: 10; .01 INJECTION, SOLUTION INFILTRATION; PERINEURAL at 09:39

## 2021-09-09 NOTE — ED PROVIDER NOTES
History  Chief Complaint   Patient presents with    Cyst     posterior scalp     56-year-old female presents to the emergency department seeing evaluation for a scalp cyst   Patient reports having history of cysts on the scalp  She states there is a small area on the back her head that is tender and feels compatible previous cyst   She is otherwise well-appearing in no acute distress  She denies any other complaints or concerns  Allergies reviewed          Prior to Admission Medications   Prescriptions Last Dose Informant Patient Reported? Taking? ALPRAZolam (XANAX) 0 5 mg tablet   No No   Sig: Take 1 tablet (0 5 mg total) by mouth daily as needed for anxiety   ARIPiprazole (ABILIFY) 2 mg tablet   No No   Sig: Take 1 tablet (2 mg total) by mouth daily   Adalimumab (Humira Pen) 40 MG/0 4ML PNKT  Self Yes No   Sig: Inject 0 4 mL (40 mg total) under the skin every 14 (fourteen) days  Aimovig 140 MG/ML SOAJ  Self No No   Sig: Inject 140mg under the skin every 30 (thirty) days  B-D 3CC LUER-DEANN SYR 25GX1" 25G X 1" 3 ML MISC  Self Yes No   Syringe/Needle, Disp, (SYRINGE 3CC/79ZN7-0/4") 27G X 1-1/4" 3 ML MISC  Self No No   Sig: Use for IM injection of ketorolac    acetaminophen (TYLENOL) 500 mg tablet  Self No No   Sig: Take 2 tablets (1,000 mg total) by mouth every 6 (six) hours as needed for mild pain or moderate pain   buPROPion (WELLBUTRIN XL) 150 mg 24 hr tablet   No No   Sig: Take 1 tablet (150 mg total) by mouth daily   dexamethasone (DECADRON) 2 mg tablet  Self No No   Si tab qam with food prn migraine  escitalopram (LEXAPRO) 10 mg tablet   No No   Sig: Take 1 tablet (10 mg total) by mouth daily   etonogestrel-ethinyl estradiol (NuvaRing) 0 12-0 015 MG/24HR vaginal ring   No No   Sig: Insert ring for 21 days then remove for one week     furosemide (LASIX) 40 mg tablet   No No   Sig: Take 1 tablet (40 mg total) by mouth daily as needed (Lower extremity edema)   ketorolac (TORADOL) 10 mg tablet  Self No No   Sig: Take 1 tablet (10 mg total) by mouth every 6 (six) hours as needed (migraine) Max 2-3 per week  melatonin 1 mg  Self No No   Si-2 tabs qhs prn insomnia   onabotulinumtoxin A (BOTOX) 100 units  Self Yes No   Sig: Inject as directed   pregabalin (LYRICA) 25 mg capsule  Self No No   Sig: Take 1 capsule (25 mg total) by mouth 2 (two) times a day   valACYclovir (VALTREX) 500 mg tablet   No No   Sig: Take 1 tablet (500 mg total) by mouth daily   vitamin B-12 (VITAMIN B-12) 1,000 mcg tablet  Self Yes No   Sig: Take 1,000 mcg by mouth daily      Facility-Administered Medications Last Administration Doses Remaining   cyanocobalamin injection 1,000 mcg 8/3/2020  8:59 AM    cyanocobalamin injection 1,000 mcg 2020 11:46 AM    cyanocobalamin injection 1,000 mcg 2020 11:29 AM           Past Medical History:   Diagnosis Date    Abnormal Pap smear of cervix     Anxiety     Arthritis     Depression     Fibromyalgia, primary     IBS (irritable bowel syndrome)     Migraine     Vitamin B12 deficiency        Past Surgical History:   Procedure Laterality Date    COLONOSCOPY N/A 2018    Procedure: COLONOSCOPY;  Surgeon: Skye Grace MD;  Location: [facility] GI LAB;   Service: Gastroenterology    PA EXC SKIN BENIG >4 CM REMAINDR BODY N/A 2021    Procedure: EXCISION OF PILAR SCALP CYST X 2 AND RIGHT INNER GROIN NEVVUS;  Surgeon: Luis Reed MD;  Location:  MAIN OR;  Service: Plastics    PA RECMPL WND SCALP,EXTR 2 6-7 5 CM N/A 2021    Procedure: CLOSURE WOUND SCALP X 2 AND RIGHT INNER GROIN;  Surgeon: Luis Reed MD;  Location:  MAIN OR;  Service: Plastics    TONSILLECTOMY      WISDOM TOOTH EXTRACTION         Family History   Problem Relation Age of Onset    Rheum arthritis Mother     Psoriasis Mother     Other Mother     Hypertension Mother     Diabetes unspecified Mother     Sjogren's syndrome Mother     Alcohol abuse Mother     Drug abuse Mother     Anxiety disorder Father     Alcohol abuse Father     Lung cancer Maternal Grandfather     Cancer Other         bone    Diabetes Other     Other Other         High blood pressure    Depression Maternal Grandmother      I have reviewed and agree with the history as documented  E-Cigarette/Vaping    E-Cigarette Use Former User     Start Date 7/1/19     Comments medical marijuana       E-Cigarette/Vaping Substances    Nicotine No     THC Yes     CBD No     Flavoring No     Other No     Unknown No      Social History     Tobacco Use    Smoking status: Never Smoker    Smokeless tobacco: Never Used   Vaping Use    Vaping Use: Former    Start date: 7/1/2019    Substances: THC   Substance Use Topics    Alcohol use: Yes     Comment: rarely    Drug use: Yes     Types: Marijuana     Comment: Medical marijuana not using        Review of Systems   Constitutional: Negative for chills and fever  HENT: Negative for congestion, dental problem and facial swelling  Eyes: Negative for visual disturbance  Respiratory: Negative for shortness of breath  Cardiovascular: Negative for chest pain  Gastrointestinal: Negative for abdominal pain, nausea and vomiting  Musculoskeletal: Negative for arthralgias and neck pain  Skin: Negative for wound  Neurological: Negative for dizziness, weakness, light-headedness, numbness and headaches  All other systems reviewed and are negative  Physical Exam  Physical Exam  Vitals and nursing note reviewed  Constitutional:       General: She is not in acute distress  Appearance: She is well-developed  She is not diaphoretic  HENT:      Head: Normocephalic and atraumatic  Comments: Small discrete soft tissue mass compatible with cyst on the posterior aspect of scalp       Right Ear: External ear normal       Left Ear: External ear normal       Nose: Nose normal    Eyes:      Conjunctiva/sclera: Conjunctivae normal       Pupils: Pupils are equal, round, and reactive to light  Cardiovascular:      Rate and Rhythm: Normal rate and regular rhythm  Heart sounds: Normal heart sounds  No murmur heard  No friction rub  No gallop  Pulmonary:      Effort: Pulmonary effort is normal  No respiratory distress  Breath sounds: Normal breath sounds  No stridor  No wheezing or rales  Abdominal:      General: Bowel sounds are normal  There is no distension  Palpations: Abdomen is soft  Tenderness: There is no abdominal tenderness  There is no guarding  Musculoskeletal:         General: No tenderness  Normal range of motion  Cervical back: Normal range of motion  Skin:     General: Skin is warm  Capillary Refill: Capillary refill takes less than 2 seconds  Neurological:      Mental Status: She is alert and oriented to person, place, and time  Vital Signs  ED Triage Vitals [09/09/21 0925]   Temperature Pulse Respirations Blood Pressure SpO2   98 1 °F (36 7 °C) 97 20 (!) 152/103 96 %      Temp Source Heart Rate Source Patient Position - Orthostatic VS BP Location FiO2 (%)   Tympanic -- -- -- --      Pain Score       7           Vitals:    09/09/21 0925   BP: (!) 152/103   Pulse: 97         Visual Acuity      ED Medications  Medications   lidocaine-epinephrine (XYLOCAINE/EPINEPHRINE) 1 %-1:100,000 injection 10 mL (10 mL Infiltration Given 9/9/21 6363)       Diagnostic Studies  Results Reviewed     None                 No orders to display              Procedures  Incision and drain    Date/Time: 9/9/2021 2:45 PM  Performed by: Lorin Bess PA-C  Authorized by: Lorin Bess PA-C   Universal Protocol:  Consent: Verbal consent obtained    Risks and benefits: risks, benefits and alternatives were discussed  Consent given by: patient  Required items: required blood products, implants, devices, and special equipment available  Patient identity confirmed: verbally with patient      Patient location:  ED  Location:     Type:  Cyst    Size: 1 5cm    Location:  Head/neck    Head/neck location:  Scalp  Pre-procedure details:     Skin preparation:  Betadine  Anesthesia (see MAR for exact dosages): Anesthesia method:  Local infiltration    Local anesthetic:  Lidocaine 1% WITH epi  Procedure details:     Complexity:  Simple    Needle aspiration: no      Incision types:  Single straight    Scalpel blade:  11    Approach:  Open    Incision depth:  Subcutaneous    Wound management:  Probed and deloculated    Drainage amount: Moderate    Packing materials:  None  Post-procedure details:     Patient tolerance of procedure: Tolerated well, no immediate complications  Comments:      Capsule removed  ED Course                                           MDM  Number of Diagnoses or Management Options  Scalp cyst: minor  Diagnosis management comments: Scalp cyst successfully removed  Capsule was removed largely intact  Recommend follow-up with General surgery with further cyst concerns  Patient educated regarding their diagnosis and given return and follow-up instructions  Patient was advised to returned to the ED with worsening symptoms or concerns  Patient is understanding of and in agreement with the treatment plan  There are no questions at the time of discharge  Risk of Complications, Morbidity, and/or Mortality  Presenting problems: low  Diagnostic procedures: low  Management options: low        Disposition  Final diagnoses:   Scalp cyst     Time reflects when diagnosis was documented in both MDM as applicable and the Disposition within this note     Time User Action Codes Description Comment    9/9/2021 10:32 AM Nikole Carter Add [L72 9] Scalp cyst       ED Disposition     ED Disposition Condition Date/Time Comment    Discharge Stable u Sep 9, 2021 10:32 AM Henri Jordan discharge to home/self care              Follow-up Information     Follow up With Specialties Details Why Contact Info Additional Information    [facility]   [facility]  [address] [address] [address] [id number]  [address] [address], [address] [facility], [place], [place], [address] [facility]           Discharge Medication List as of 9/9/2021 10:33 AM      CONTINUE these medications which have NOT CHANGED    Details   acetaminophen (TYLENOL) 500 mg tablet Take 2 tablets (1,000 mg total) by mouth every 6 (six) hours as needed for mild pain or moderate pain, Starting Sat 4/24/2021, Normal      Adalimumab (Humira Pen) 40 MG/0 4ML PNKT Inject 0 4 mL (40 mg total) under the skin every 14 (fourteen) days  , Historical Med      Aimovig 140 MG/ML SOAJ Inject 140mg under the skin every 30 (thirty) days  , Normal      ALPRAZolam (XANAX) 0 5 mg tablet Take 1 tablet (0 5 mg total) by mouth daily as needed for anxiety, Starting Fri 9/3/2021, Normal      ARIPiprazole (ABILIFY) 2 mg tablet Take 1 tablet (2 mg total) by mouth daily, Starting Tue 8/3/2021, Normal      !! B-D 3CC LUER-DEANN SYR 25GX1" 25G X 1" 3 ML MISC Starting Thu 7/26/2018, Historical Med      buPROPion (WELLBUTRIN XL) 150 mg 24 hr tablet Take 1 tablet (150 mg total) by mouth daily, Starting Tue 8/10/2021, Normal      dexamethasone (DECADRON) 2 mg tablet 1 tab qam with food prn migraine , Normal      escitalopram (LEXAPRO) 10 mg tablet Take 1 tablet (10 mg total) by mouth daily, Starting Fri 9/3/2021, Normal      etonogestrel-ethinyl estradiol (NuvaRing) 0 12-0 015 MG/24HR vaginal ring Insert ring for 21 days then remove for one week , Normal      furosemide (LASIX) 40 mg tablet Take 1 tablet (40 mg total) by mouth daily as needed (Lower extremity edema), Starting Fri 7/16/2021, No Print      ketorolac (TORADOL) 10 mg tablet Take 1 tablet (10 mg total) by mouth every 6 (six) hours as needed (migraine) Max 2-3 per week , Starting Mon 6/7/2021, Normal      melatonin 1 mg 1-2 tabs qhs prn insomnia, Normal      onabotulinumtoxin A (BOTOX) 100 units Inject as directed, Starting Tue 8/29/2017, Historical Med      pregabalin (LYRICA) 25 mg capsule Take 1 capsule (25 mg total) by mouth 2 (two) times a day, Starting Thu 2/4/2021, Normal      !! Syringe/Needle, Disp, (SYRINGE 3CC/53PZ5-8/4") 27G X 1-1/4" 3 ML MISC Use for IM injection of ketorolac , Normal      valACYclovir (VALTREX) 500 mg tablet Take 1 tablet (500 mg total) by mouth daily, Starting Tue 9/7/2021, Until Mon 12/6/2021, Normal      vitamin B-12 (VITAMIN B-12) 1,000 mcg tablet Take 1,000 mcg by mouth daily, Historical Med       !! - Potential duplicate medications found  Please discuss with provider  No discharge procedures on file      PDMP Review       Value Time User    PDMP Reviewed  Yes 7/14/2021 11:56 AM Conner Jansen MD          ED Provider  Electronically Signed by           Edwige Cavazos PA-C  09/09/21 2672

## 2021-09-10 ENCOUNTER — OFFICE VISIT (OUTPATIENT)
Dept: PHYSICAL THERAPY | Age: 31
End: 2021-09-10
Payer: COMMERCIAL

## 2021-09-10 DIAGNOSIS — M25.50 ARTHRALGIA OF MULTIPLE SITES: Primary | ICD-10-CM

## 2021-09-10 PROCEDURE — 97113 AQUATIC THERAPY/EXERCISES: CPT | Performed by: PHYSICAL THERAPIST

## 2021-09-10 NOTE — PROGRESS NOTES
Daily Note     Today's date: 9/10/2021  Patient name: Daphne Mccollumr  : 1990  MRN: [id number]  Referring provider: Kaden So PA-C  Dx:   Encounter Diagnosis     ICD-10-CM    1  Arthralgia of multiple sites  M25 50                   Subjective: Patient reports going to ED for significant migraines - relief provided  Objective: See treatment diary below      Assessment: Tolerated treatment well  Patient would benefit from continued PT      Plan: Continue per plan of care        Precautions: fibromyalgia, chronic fatigue      Daily Treatment Diary     Manual  8/23 8/30 9/1 9/10 6/15 6/29 7/27 8/2 8/10 8/17                                                                        Exercise Diary                           Walking x5  x5 x5 x5 x5 x5 x5 x5 x5                             Seated hamstring stretch x4 hold 20 sec  x4 x4 x4 x4 x4 x4 x4  x5 x5   Seated piriformis stretch x4 hold 20 sec  x4 x4 x4 x4 x4 x4 x4  x5 x5                Standing hip flexion 2x10 2x10 3x20 2x10 2x10 2x12 2x12 2x15 2x15 2x15   Standing hip abd 2x10 2x10 3x20 2x10 2x10 2x12 2x12 2x15 2x15 2x15                Standing knee flexion 2x15 2x15 3x20 2x15 2x15 2x15 2x15 2x15 2x15 2x15   Step ups 2x10 2x10 2x10 2x10 2x10 2x12 - each 2x12 2x15 2x15 2x15                Pony cycling x5 minutes x5 x5 min x5 x5 x5 x5 x5 x5 x5   Pony cross country skiing x5 minutes x5 x5 min x5 x5 x5 x5 x5 x5 x5   Heel raises/toe raises 2x15 2x15 2x15 2x15 2x15 2x15 2x15 2x15 2x15 2x15   Heel-to-toe balance x10 reps each x10 x10 x10 x10 x10 x10 x10 x10 x10   Pony 3x15  3x15 Hip abd/add - 3x15 3x15 3x15 3x15 3x15 3x15 3x15   Standing quad stretch   x5  x5 reps hold 30 sec  x5 x5 x5 x5 x5   Ladder squats 3x10 3x10 3x10 3x10     3x10 3x10   SLS with paddle x10 reps x10 reps np x10 reps     x10 reps hold 10 sec x10                                              Modalities

## 2021-09-13 ENCOUNTER — OFFICE VISIT (OUTPATIENT)
Dept: NEUROLOGY | Facility: CLINIC | Age: 31
End: 2021-09-13

## 2021-09-13 DIAGNOSIS — G93.40 ENCEPHALOPATHY: Primary | ICD-10-CM

## 2021-09-13 DIAGNOSIS — R41.89 COGNITIVE DECLINE: ICD-10-CM

## 2021-09-13 DIAGNOSIS — G43.709 CHRONIC MIGRAINE WITHOUT AURA WITHOUT STATUS MIGRAINOSUS, NOT INTRACTABLE: ICD-10-CM

## 2021-09-13 PROCEDURE — NC001 PR NO CHARGE

## 2021-09-13 PROCEDURE — NC001 PR NO CHARGE: Performed by: CLINICAL NEUROPSYCHOLOGIST

## 2021-09-14 RX ORDER — KETOROLAC TROMETHAMINE 10 MG/1
10 TABLET, FILM COATED ORAL EVERY 6 HOURS PRN
Qty: 10 TABLET | Refills: 0 | Status: SHIPPED | OUTPATIENT
Start: 2021-09-14 | End: 2022-03-22 | Stop reason: SDUPTHER

## 2021-09-15 ENCOUNTER — TELEPHONE (OUTPATIENT)
Dept: OBGYN CLINIC | Facility: MEDICAL CENTER | Age: 31
End: 2021-09-15

## 2021-09-15 ENCOUNTER — TELEPHONE (OUTPATIENT)
Dept: NEUROLOGY | Facility: CLINIC | Age: 31
End: 2021-09-15

## 2021-09-15 NOTE — TELEPHONE ENCOUNTER
Pt called regarding her my chart message she sent last week, has not heard back  Please review  She would like for someone to give her a call back today  Thanks

## 2021-09-15 NOTE — TELEPHONE ENCOUNTER
Received call from patient's pharmacy questioning whether patient should be on both toradol and indomethacin  I called the pharmacy and made them aware that the scrip for indomethacin has been discontinued since May 2021 and the instructions stated to [address] [place] [address]  He states the patient just refilled the indomethacin on Monday, is now looking for Toradol and states she uses one for immediate relief and the other for long term relief  Pharmacist is not sure if he should fill the toradol at this time as he does not think patient will be compliant with holding indomethacin if toradol is dispensed     [address] - are you comfortable with pharmacy still dispensing toradol?  Patient just picked up indomethacin on Monday this week and is now looking for her toradol    Please advise     Cb#: [phone]

## 2021-09-16 NOTE — TELEPHONE ENCOUNTER
Cannot find indocin on her list anymore, but assuming dose is 25mg? Pt aware not to take indocin within 24 hours of taking toradol and vice versa  She has expressed compliance with this plan in the past  I think I only give #10 of each

## 2021-09-16 NOTE — TELEPHONE ENCOUNTER
Last order of indomethacin was on 5/14/21 with 2 refills  Patient must have had some available at the pharmacy  So you are okay with pharmacist dispensing both meds to patient?

## 2021-09-17 ENCOUNTER — OFFICE VISIT (OUTPATIENT)
Dept: FAMILY MEDICINE CLINIC | Facility: CLINIC | Age: 31
End: 2021-09-17
Payer: COMMERCIAL

## 2021-09-17 ENCOUNTER — SOCIAL WORK (OUTPATIENT)
Dept: BEHAVIORAL/MENTAL HEALTH CLINIC | Facility: CLINIC | Age: 31
End: 2021-09-17
Payer: COMMERCIAL

## 2021-09-17 VITALS
TEMPERATURE: 97.4 F | DIASTOLIC BLOOD PRESSURE: 88 MMHG | BODY MASS INDEX: 43.16 KG/M2 | HEIGHT: 63 IN | WEIGHT: 243.6 LBS | SYSTOLIC BLOOD PRESSURE: 128 MMHG

## 2021-09-17 DIAGNOSIS — K21.9 GASTROESOPHAGEAL REFLUX DISEASE WITHOUT ESOPHAGITIS: ICD-10-CM

## 2021-09-17 DIAGNOSIS — F33.1 DEPRESSION, MAJOR, RECURRENT, MODERATE (HCC): Primary | Chronic | ICD-10-CM

## 2021-09-17 DIAGNOSIS — F41.1 GENERALIZED ANXIETY DISORDER: Chronic | ICD-10-CM

## 2021-09-17 DIAGNOSIS — Z23 NEED FOR IMMUNIZATION AGAINST INFLUENZA: ICD-10-CM

## 2021-09-17 DIAGNOSIS — R60.0 LOWER EXTREMITY EDEMA: Primary | ICD-10-CM

## 2021-09-17 PROCEDURE — 90834 PSYTX W PT 45 MINUTES: CPT | Performed by: SOCIAL WORKER

## 2021-09-17 PROCEDURE — 90686 IIV4 VACC NO PRSV 0.5 ML IM: CPT

## 2021-09-17 PROCEDURE — 90471 IMMUNIZATION ADMIN: CPT

## 2021-09-17 PROCEDURE — 99213 OFFICE O/P EST LOW 20 MIN: CPT | Performed by: FAMILY MEDICINE

## 2021-09-17 RX ORDER — PANTOPRAZOLE SODIUM 40 MG/1
40 TABLET, DELAYED RELEASE ORAL
Qty: 30 TABLET | Refills: 5 | Status: SHIPPED | OUTPATIENT
Start: 2021-09-17 | End: 2022-03-15

## 2021-09-17 RX ORDER — GABAPENTIN 300 MG/1
CAPSULE ORAL
COMMUNITY
Start: 2021-09-08 | End: 2021-12-10

## 2021-09-17 NOTE — PSYCH
Psychotherapy Provided: Individual Psychotherapy 45 minutes     Length of time in session: 45 minutes, follow up in 2 weeks    Encounter Diagnosis     ICD-10-CM    1  Depression, major, recurrent, moderate ([facility] [address] )  F33 1    2  Generalized anxiety disorder  F41 1        Goals addressed in session: Goal 1     Pain:      moderate to severe-- physical health issues that increase anxiety/depression    Current suicide risk : Low     DATA: Met with Stephanie for scheduled individual session  Topics of discussion included relationships with family, physical health concerns and relationship with significant other  "I had my neuropsych testing " Stephanie discussed her completion of her baseline neuropsych testing  She states that she won't get the results until approximately October  She states that she feels very good about getting this initial testing completed  Graciela Rangel also discussed her recent visit to the OB to discuss her STD test results  She states that she is very confused by the results of the testing and plans to follow up with her PCP  Graciela Rangel discussed accompanying her grandmother to a recent doctor visit, which caused significant anxiety for her  She rated her anxiety prior to the appointment as a 7/10; however, upon completion of the visit, she had a reduction in anxiety which was a 2/10  She states that she will continue to expose herself to this type of stimuli in the future  Stephanie discussed her relationship with her SO  She states that she feels that she is no longer putting emotional effort into the relationship  This writer gave her the DBT handouts and worksheets on Ending Relationships and reviewed these concepts with her  She agreed to practice these concepts and complete the worksheets prior to her next session  Client shows evidence of utilizing DBT-informed skills, Mindfulness-based strategies, weighing pros and cons and emotion regulation skills skills to manage mental health symptoms   During this session, this clinician used the following therapeutic modalities: supportive psychotherapy, client-centered therapy, mindfulness-based strategies, DBT-informed skills, Motivational Interviewing and solution-focused therapy  ASSESSMENT: Gisela Gonzalez presents with a normal mood  Her affect is normal range and intensity, appropriate  Gisela Gonzalez exhibits strong therapeutic rapport with this clinician  Gisela Gonzalez continues to exhibit willingness to work on treatment goals and objectives  Gisela Gonzalez presents with a minimal risk of suicide, minimal risk of self-harm, and minimal risk of harm to others  PLAN: Gisela Gonzalez will return in two weeks for the next scheduled session  Between sessions, Gisela Gonzalez will work on completion of the DBT handouts/worksheets she was given during today's sessions and will report back during the next session re: successes and barriers  At the next session, this clinician will use supportive psychotherapy, client-centered therapy, mindfulness-based strategies, DBT-informed skills, Motivational Interviewing and solution-focused therapy to address her mood regulation and relationship concerns, in an effort to assist Gisela Gonzalez with meeting treatment goals  Behavioral Health Treatment Plan [facility]: Diagnosis and Treatment Plan explained to Geovannakarishma Funmi relates understanding diagnosis and is agreeable to Treatment Plan   Yes

## 2021-09-17 NOTE — PROGRESS NOTES
Assessment/Plan: edema has resolved  Records and labs reviewed  Patient use Protonix daily as directed for GERD  Diagnoses and all orders for this visit:    Need for immunization against influenza  -     FLUZONE: influenza vaccine, quadrivalent, 0 5 mL    Lower extremity edema    Other orders  -     gabapentin (NEURONTIN) 300 mg capsule; Start with one capsule at bedtime and may increase to one capsule in AM and one capsule at bedtime if needed and if tolerating well  Subjective:        Patient ID: Shane Ma is a 28 y o  female  Patient is here to follow-up on lower extremity edema  This is resolved off Lyrica  Patient is using gabapentin 300 mg daily and tolerating well  No new chest pain shortness of breath problems urinating or defecating at this time  Patient did have Doppler study of lower extremities which was normal and reviewed with patient  Patient did not go for echo due to migraine at time  Patient on Humira since February  The following portions of the patient's history were reviewed and updated as appropriate: allergies, current medications, past family history, past medical history, past social history, past surgical history and problem list       Review of Systems   Constitutional: Negative  HENT: Negative  Eyes: Negative  Respiratory: Negative  Cardiovascular: Negative  Gastrointestinal: Negative  GERD   Endocrine: Negative  Genitourinary: Negative  Musculoskeletal: Negative  Skin: Negative  Allergic/Immunologic: Negative  Neurological: Negative  Hematological: Negative  Psychiatric/Behavioral: Negative  Objective:      BMI Counseling: Body mass index is 43 15 kg/m²  The BMI is above normal  Nutrition recommendations include decreasing portion sizes  Exercise recommendations include moderate physical activity 150 minutes/week   Rationale for BMI follow-up plan is due to patient being overweight or obese              /88 (BP Location: Right arm, Patient Position: Sitting, Cuff Size: Standard)   Temp (!) 97 4 °F (36 3 °C) (Tympanic)   Ht 5' 3" (1 6 m)   Wt 110 kg (243 lb 9 6 oz)   BMI 43 15 kg/m²          Physical Exam  Vitals and nursing note reviewed  Constitutional:       General: She is not in acute distress  Appearance: Normal appearance  She is not ill-appearing, toxic-appearing or diaphoretic  HENT:      Head: Normocephalic and atraumatic  Cardiovascular:      Rate and Rhythm: Normal rate and regular rhythm  Pulses: Normal pulses  Heart sounds: Normal heart sounds  Pulmonary:      Effort: Pulmonary effort is normal       Breath sounds: Normal breath sounds  Musculoskeletal:      Right lower leg: No edema  Left lower leg: No edema  Neurological:      Mental Status: She is alert  Psychiatric:         Mood and Affect: Mood normal          Behavior: Behavior normal          Thought Content:  Thought content normal

## 2021-09-21 ENCOUNTER — OFFICE VISIT (OUTPATIENT)
Dept: PHYSICAL THERAPY | Age: 31
End: 2021-09-21
Payer: COMMERCIAL

## 2021-09-21 DIAGNOSIS — M25.50 ARTHRALGIA OF MULTIPLE SITES: Primary | ICD-10-CM

## 2021-09-21 PROCEDURE — 97113 AQUATIC THERAPY/EXERCISES: CPT | Performed by: PHYSICAL THERAPIST

## 2021-09-21 NOTE — PROGRESS NOTES
Daily Note     Today's date: 2021  Patient name: Marcy Beltran  : 1990  MRN: [id number]  Referring provider: Ashia Bucio PA-C  Dx:   Encounter Diagnosis     ICD-10-CM    1  Arthralgia of multiple sites  M25 50                   Subjective: Some increase in hip sx's noted - better post-rx  Objective: See treatment diary below      Assessment: Tolerated treatment well  Patient would benefit from continued PT      Plan: Continue per plan of care        Precautions: fibromyalgia, chronic fatigue      Daily Treatment Diary     Manual  8/23 8/30 9/1 9/10 9/21 6/29 7/27 8/2 8/10 8/17                                                                        Exercise Diary                           Walking x5  x5 x5 x5 x5 x5 x5 x5 x5                             Seated hamstring stretch x4 hold 20 sec  x4 x4 x4 x4 x4 x4 x4  x5 x5   Seated piriformis stretch x4 hold 20 sec  x4 x4 x4 x4 x4 x4 x4  x5 x5                Standing hip flexion 2x10 2x10 3x20 2x10 2x10 2x12 2x12 2x15 2x15 2x15   Standing hip abd 2x10 2x10 3x20 2x10 2x10 2x12 2x12 2x15 2x15 2x15                Standing knee flexion 2x15 2x15 3x20 2x15 2x15 2x15 2x15 2x15 2x15 2x15   Step ups 2x10 2x10 2x10 2x10 2x10 2x12 - each 2x12 2x15 2x15 2x15                Pony cycling x5 minutes x5 x5 min x5 x5 x5 x5 x5 x5 x5   Pony cross country skiing x5 minutes x5 x5 min x5 x5 x5 x5 x5 x5 x5   Heel raises/toe raises 2x15 2x15 2x15 2x15 2x15 2x15 2x15 2x15 2x15 2x15   Heel-to-toe balance x10 reps each x10 x10 x10 x10 x10 x10 x10 x10 x10   Pony 3x15  3x15 Hip abd/add - 3x15 3x15 3x15 3x15 3x15 3x15 3x15   Standing quad stretch   x5  x5 reps hold 30 sec  x5 x5 x5 x5 x5   Ladder squats 3x10 3x10 3x10 3x10     3x10 3x10   SLS with paddle x10 reps x10 reps np x10 reps     x10 reps hold 10 sec x10                                              Modalities

## 2021-09-23 ENCOUNTER — APPOINTMENT (OUTPATIENT)
Dept: PHYSICAL THERAPY | Age: 31
End: 2021-09-23
Payer: COMMERCIAL

## 2021-09-23 ENCOUNTER — TELEPHONE (OUTPATIENT)
Dept: FAMILY MEDICINE CLINIC | Facility: CLINIC | Age: 31
End: 2021-09-23

## 2021-09-25 ENCOUNTER — APPOINTMENT (EMERGENCY)
Dept: RADIOLOGY | Facility: HOSPITAL | Age: 31
End: 2021-09-25
Payer: COMMERCIAL

## 2021-09-25 ENCOUNTER — HOSPITAL ENCOUNTER (EMERGENCY)
Facility: HOSPITAL | Age: 31
Discharge: HOME | End: 2021-09-25
Attending: EMERGENCY MEDICINE | Admitting: EMERGENCY MEDICINE
Payer: COMMERCIAL

## 2021-09-25 VITALS
BODY MASS INDEX: 43.05 KG/M2 | OXYGEN SATURATION: 97 % | RESPIRATION RATE: 20 BRPM | WEIGHT: 243 LBS | DIASTOLIC BLOOD PRESSURE: 80 MMHG | TEMPERATURE: 98.6 F | SYSTOLIC BLOOD PRESSURE: 132 MMHG | HEART RATE: 84 BPM

## 2021-09-25 DIAGNOSIS — M25.551 BILATERAL HIP PAIN: ICD-10-CM

## 2021-09-25 DIAGNOSIS — R07.9 CHEST PAIN: Primary | ICD-10-CM

## 2021-09-25 DIAGNOSIS — M25.552 BILATERAL HIP PAIN: ICD-10-CM

## 2021-09-25 LAB
ANION GAP SERPL CALCULATED.3IONS-SCNC: 10 MMOL/L (ref 4–13)
ATRIAL RATE: 88 BPM
BASOPHILS # BLD AUTO: 0 THOUSANDS/ΜL (ref 0–0.1)
BASOPHILS NFR BLD AUTO: 0 % (ref 0–2)
BUN SERPL-MCNC: 12 MG/DL (ref 7–25)
CALCIUM SERPL-MCNC: 8.8 MG/DL (ref 8.6–10.5)
CHLORIDE SERPL-SCNC: 103 MMOL/L (ref 98–107)
CO2 SERPL-SCNC: 22 MMOL/L (ref 21–31)
CREAT SERPL-MCNC: 0.67 MG/DL (ref 0.6–1.2)
D DIMER PPP FEU-MCNC: <0.27 UG/ML FEU
EOSINOPHIL # BLD AUTO: 0.2 THOUSAND/ΜL (ref 0–0.61)
EOSINOPHIL NFR BLD AUTO: 1 % (ref 0–5)
ERYTHROCYTE [DISTWIDTH] IN BLOOD BY AUTOMATED COUNT: 13.7 % (ref 11.5–14.5)
GFR SERPL CREATININE-BSD FRML MDRD: 118 ML/MIN/1.73SQ M
GLUCOSE SERPL-MCNC: 100 MG/DL (ref 65–99)
HCG SERPL QL: NEGATIVE
HCT VFR BLD AUTO: 37 % (ref 42–47)
HGB BLD-MCNC: 12.5 G/DL (ref 12–16)
LYMPHOCYTES # BLD AUTO: 5.5 THOUSANDS/ΜL (ref 0.6–4.47)
LYMPHOCYTES NFR BLD AUTO: 43 % (ref 21–51)
MCH RBC QN AUTO: 28.8 PG (ref 26–34)
MCHC RBC AUTO-ENTMCNC: 33.7 G/DL (ref 31–37)
MCV RBC AUTO: 85 FL (ref 81–99)
MONOCYTES # BLD AUTO: 1 THOUSAND/ΜL (ref 0.17–1.22)
MONOCYTES NFR BLD AUTO: 8 % (ref 2–12)
NEUTROPHILS # BLD AUTO: 5.9 THOUSANDS/ΜL (ref 1.4–6.5)
NEUTS SEG NFR BLD AUTO: 47 % (ref 42–75)
P AXIS: 30 DEGREES
PLATELET # BLD AUTO: 441 THOUSANDS/UL (ref 149–390)
PMV BLD AUTO: 6.9 FL (ref 8.6–11.7)
POTASSIUM SERPL-SCNC: 3.8 MMOL/L (ref 3.5–5.5)
PR INTERVAL: 158 MS
QRS AXIS: 14 DEGREES
QRSD INTERVAL: 88 MS
QT INTERVAL: 368 MS
QTC INTERVAL: 445 MS
RBC # BLD AUTO: 4.34 MILLION/UL (ref 3.9–5.2)
SODIUM SERPL-SCNC: 135 MMOL/L (ref 134–143)
T WAVE AXIS: 12 DEGREES
TROPONIN I SERPL-MCNC: <0.03 NG/ML
VENTRICULAR RATE: 88 BPM
WBC # BLD AUTO: 12.6 THOUSAND/UL (ref 4.8–10.8)

## 2021-09-25 PROCEDURE — 93005 ELECTROCARDIOGRAM TRACING: CPT

## 2021-09-25 PROCEDURE — 93010 ELECTROCARDIOGRAM REPORT: CPT | Performed by: INTERNAL MEDICINE

## 2021-09-25 PROCEDURE — 85025 COMPLETE CBC W/AUTO DIFF WBC: CPT | Performed by: EMERGENCY MEDICINE

## 2021-09-25 PROCEDURE — 85379 FIBRIN DEGRADATION QUANT: CPT | Performed by: EMERGENCY MEDICINE

## 2021-09-25 PROCEDURE — 99284 EMERGENCY DEPT VISIT MOD MDM: CPT

## 2021-09-25 PROCEDURE — 73502 X-RAY EXAM HIP UNI 2-3 VIEWS: CPT

## 2021-09-25 PROCEDURE — 99285 EMERGENCY DEPT VISIT HI MDM: CPT | Performed by: EMERGENCY MEDICINE

## 2021-09-25 PROCEDURE — 80048 BASIC METABOLIC PNL TOTAL CA: CPT | Performed by: EMERGENCY MEDICINE

## 2021-09-25 PROCEDURE — 71046 X-RAY EXAM CHEST 2 VIEWS: CPT

## 2021-09-25 PROCEDURE — 84703 CHORIONIC GONADOTROPIN ASSAY: CPT | Performed by: EMERGENCY MEDICINE

## 2021-09-25 PROCEDURE — 84484 ASSAY OF TROPONIN QUANT: CPT | Performed by: EMERGENCY MEDICINE

## 2021-09-25 PROCEDURE — 96361 HYDRATE IV INFUSION ADD-ON: CPT

## 2021-09-25 PROCEDURE — 96375 TX/PRO/DX INJ NEW DRUG ADDON: CPT

## 2021-09-25 PROCEDURE — 96374 THER/PROPH/DIAG INJ IV PUSH: CPT

## 2021-09-25 PROCEDURE — 36415 COLL VENOUS BLD VENIPUNCTURE: CPT | Performed by: EMERGENCY MEDICINE

## 2021-09-25 RX ORDER — KETOROLAC TROMETHAMINE 30 MG/ML
15 INJECTION, SOLUTION INTRAMUSCULAR; INTRAVENOUS ONCE
Status: COMPLETED | OUTPATIENT
Start: 2021-09-25 | End: 2021-09-25

## 2021-09-25 RX ORDER — PREDNISONE 20 MG/1
40 TABLET ORAL DAILY
Qty: 10 TABLET | Refills: 0 | Status: SHIPPED | OUTPATIENT
Start: 2021-09-25 | End: 2021-09-30

## 2021-09-25 RX ORDER — METHYLPREDNISOLONE SODIUM SUCCINATE 125 MG/2ML
125 INJECTION, POWDER, LYOPHILIZED, FOR SOLUTION INTRAMUSCULAR; INTRAVENOUS ONCE
Status: COMPLETED | OUTPATIENT
Start: 2021-09-25 | End: 2021-09-25

## 2021-09-25 RX ADMIN — METHYLPREDNISOLONE SODIUM SUCCINATE 125 MG: 125 INJECTION, POWDER, FOR SOLUTION INTRAMUSCULAR; INTRAVENOUS at 05:50

## 2021-09-25 RX ADMIN — SODIUM CHLORIDE 1000 ML: 0.9 INJECTION, SOLUTION INTRAVENOUS at 05:49

## 2021-09-25 RX ADMIN — KETOROLAC TROMETHAMINE 15 MG: 30 INJECTION, SOLUTION INTRAMUSCULAR; INTRAVENOUS at 05:50

## 2021-09-25 NOTE — ED PROVIDER NOTES
History  Chief Complaint   Patient presents with    Hip Pain     Patient reports rib and hip pain that began Wednesday night  27-year-old female presenting for approximately 2-3 days of chest pain and bilateral hip pain  She states that it feels like a flare of her fibromyalgia  States she started taking her steroids which she was given by her rheumatologist for these flares 3 days ago without relief, also states she is trying Tylenol without relief  States she has chest pain that is worse with deep inspiration  Denies hormone therapy, recent travel, recent surgery, history of blood clots other than a small 1 from an IV  Denies fevers, sore throat, cough, abdominal pain vomiting diarrhea constipation dysuria, hematuria, denies pregnancy at this time  Prior to Admission Medications   Prescriptions Last Dose Informant Patient Reported? Taking? ALPRAZolam (XANAX) 0 5 mg tablet   No No   Sig: Take 1 tablet (0 5 mg total) by mouth daily as needed for anxiety   ARIPiprazole (ABILIFY) 2 mg tablet   No No   Sig: Take 1 tablet (2 mg total) by mouth daily   Adalimumab (Humira Pen) 40 MG/0 4ML PNKT  Self Yes No   Sig: Inject 0 4 mL (40 mg total) under the skin every 14 (fourteen) days  Aimovig 140 MG/ML SOAJ  Self No No   Sig: Inject 140mg under the skin every 30 (thirty) days  B-D 3CC LUER-DEANN SYR 25GX1" 25G X 1" 3 ML MISC  Self Yes No   Syringe/Needle, Disp, (SYRINGE 3CC/26XU4-8/4") 27G X 1-1/4" 3 ML MISC  Self No No   Sig: Use for IM injection of ketorolac    acetaminophen (TYLENOL) 500 mg tablet  Self No No   Sig: Take 2 tablets (1,000 mg total) by mouth every 6 (six) hours as needed for mild pain or moderate pain   buPROPion (WELLBUTRIN XL) 150 mg 24 hr tablet   No No   Sig: Take 1 tablet (150 mg total) by mouth daily   dexamethasone (DECADRON) 2 mg tablet  Self No No   Si tab qam with food prn migraine     escitalopram (LEXAPRO) 10 mg tablet   No No   Sig: Take 1 tablet (10 mg total) by mouth daily   etonogestrel-ethinyl estradiol (NuvaRing) 0 12-0 015 MG/24HR vaginal ring   No No   Sig: Insert ring for 21 days then remove for one week    gabapentin (NEURONTIN) 300 mg capsule   Yes No   Sig: Start with one capsule at bedtime and may increase to one capsule in AM and one capsule at bedtime if needed and if tolerating well    ketorolac (TORADOL) 10 mg tablet   No No   Sig: Take 1 tablet (10 mg total) by mouth every 6 (six) hours as needed (migraine) Max 2-3 per week  melatonin 1 mg  Self No No   Si-2 tabs qhs prn insomnia   onabotulinumtoxin A (BOTOX) 100 units  Self Yes No   Sig: Inject as directed   pantoprazole (PROTONIX) 40 mg tablet   No No   Sig: Take 1 tablet (40 mg total) by mouth daily before breakfast   valACYclovir (VALTREX) 500 mg tablet   No No   Sig: Take 1 tablet (500 mg total) by mouth daily   vitamin B-12 (VITAMIN B-12) 1,000 mcg tablet  Self Yes No   Sig: Take 1,000 mcg by mouth daily      Facility-Administered Medications Last Administration Doses Remaining   cyanocobalamin injection 1,000 mcg 8/3/2020  8:59 AM    cyanocobalamin injection 1,000 mcg 2020 11:46 AM    cyanocobalamin injection 1,000 mcg 2020 11:29 AM           Past Medical History:   Diagnosis Date    Abnormal Pap smear of cervix     Anxiety     Arthritis     Depression     Fibromyalgia, primary     IBS (irritable bowel syndrome)     Migraine     Vitamin B12 deficiency        Past Surgical History:   Procedure Laterality Date    COLONOSCOPY N/A 2018    Procedure: COLONOSCOPY;  Surgeon: Cruz Olivas MD;  Location: [facility];   Service: Gastroenterology    OR EXC SKIN BENIG >4 CM REMAINDR BODY N/A 2021    Procedure: EXCISION OF PILAR SCALP CYST X 2 AND RIGHT INNER GROIN NEVVUS;  Surgeon: Cristi Perez MD;  Location: [address] [place] [address];  Service: Plastics    OR RECMPL WND SCALP,EXTR 2 6-7 5 CM N/A 2021    Procedure: CLOSURE WOUND SCALP X 2 AND RIGHT INNER GROIN;  Surgeon: Payton Carrillo Tracie Pleitez MD;  Location: [address] [place] [address] OR;  Service: Plastics    TONSILLECTOMY      WISDOM TOOTH EXTRACTION         Family History   Problem Relation Age of Onset    Rheum arthritis Mother     Psoriasis Mother     Other Mother     Hypertension Mother     Diabetes unspecified Mother     Sjogren's syndrome Mother     Alcohol abuse Mother     Drug abuse Mother     Anxiety disorder Father     Alcohol abuse Father     Lung cancer Maternal Grandfather     Cancer Other         bone    Diabetes Other     Other Other         High blood pressure    Depression Maternal Grandmother      I have reviewed and agree with the history as documented  E-Cigarette/Vaping    E-Cigarette Use Former User     Start Date 7/1/19     Comments medical marijuana       E-Cigarette/Vaping Substances    Nicotine No     THC Yes     CBD No     Flavoring No     Other No     Unknown No      Social History     Tobacco Use    Smoking status: Never Smoker    Smokeless tobacco: Never Used   Vaping Use    Vaping Use: Former    Start date: 7/1/2019    Substances: THC   Substance Use Topics    Alcohol use: Yes     Comment: rarely    Drug use: Yes     Types: Marijuana     Comment: Medical marijuana not using        Review of Systems   Cardiovascular: Positive for chest pain  Musculoskeletal: Positive for arthralgias (B/L hips)  All other systems reviewed and are negative  Physical Exam  Physical Exam  Vitals and nursing note reviewed  Constitutional:       General: She is not in acute distress  Appearance: She is well-developed  She is not ill-appearing, toxic-appearing or diaphoretic  HENT:      Head: Normocephalic and atraumatic  Right Ear: External ear normal       Left Ear: External ear normal       Nose: Nose normal    Eyes:      General: No scleral icterus  Right eye: No discharge  Left eye: No discharge        Conjunctiva/sclera: Conjunctivae normal       Pupils: Pupils are equal, round, and reactive to light  Neck:      Vascular: No JVD  Trachea: No tracheal deviation  Cardiovascular:      Rate and Rhythm: Normal rate and regular rhythm  Heart sounds: Normal heart sounds  No murmur heard  No friction rub  No gallop  Pulmonary:      Effort: Pulmonary effort is normal  No respiratory distress  Breath sounds: Normal breath sounds  No stridor  No wheezing or rales  Abdominal:      General: Bowel sounds are normal  There is no distension  Palpations: Abdomen is soft  There is no mass  Tenderness: There is no abdominal tenderness  There is no guarding  Musculoskeletal:         General: No tenderness or deformity  Normal range of motion  Cervical back: Normal range of motion and neck supple  Comments: Ambulatory without any obvious antalgia noted   Skin:     General: Skin is warm and dry  Coloration: Skin is not pale  Findings: No erythema or rash  Neurological:      Mental Status: She is alert and oriented to person, place, and time  Cranial Nerves: No cranial nerve deficit  Sensory: No sensory deficit  Motor: No abnormal muscle tone  Psychiatric:         Behavior: Behavior normal          Thought Content:  Thought content normal          Judgment: Judgment normal          Vital Signs  ED Triage Vitals   Temperature Pulse Respirations Blood Pressure SpO2   09/25/21 0507 09/25/21 0507 09/25/21 0507 09/25/21 0507 09/25/21 0507   98 6 °F (37 °C) 102 18 134/80 99 %      Temp Source Heart Rate Source Patient Position - Orthostatic VS BP Location FiO2 (%)   09/25/21 0507 09/25/21 0507 09/25/21 0507 09/25/21 0507 --   Tympanic Monitor Lying Left arm       Pain Score       09/25/21 0642       5           Vitals:    09/25/21 0507   BP: 134/80   Pulse: 102   Patient Position - Orthostatic VS: Lying         Visual Acuity      ED Medications  Medications   sodium chloride 0 9 % bolus 1,000 mL (1,000 mL Intravenous New Bag 9/25/21 0549) methylPREDNISolone sodium succinate (Solu-MEDROL) injection 125 mg (125 mg Intravenous Given 9/25/21 0550)   ketorolac (TORADOL) injection 15 mg (15 mg Intravenous Given 9/25/21 0550)       Diagnostic Studies  Results Reviewed     Procedure Component Value Units Date/Time    Basic metabolic panel [[id number]]  (Abnormal) Collected: 09/25/21 0547    Lab Status: Final result Specimen: Blood from Arm, Left Updated: 09/25/21 0626     Sodium 135 mmol/L      Potassium 3 8 mmol/L      Chloride 103 mmol/L      CO2 22 mmol/L      ANION GAP 10 mmol/L      BUN 12 mg/dL      Creatinine 0 67 mg/dL      Glucose 100 mg/dL      Calcium 8 8 mg/dL      eGFR 118 ml/min/1 73sq m     Narrative:      [place] guidelines for Chronic Kidney Disease (CKD):     Stage 1 with normal or high GFR (GFR > 90 mL/min/1 73 square meters)    Stage 2 Mild CKD (GFR = 60-89 mL/min/1 73 square meters)    Stage 3A Moderate CKD (GFR = 45-59 mL/min/1 73 square meters)    Stage 3B Moderate CKD (GFR = 30-44 mL/min/1 73 square meters)    Stage 4 Severe CKD (GFR = 15-29 mL/min/1 73 square meters)    Stage 5 End Stage CKD (GFR <15 mL/min/1 73 square meters)  Note: GFR calculation is accurate only with a steady state creatinine    hCG, qualitative pregnancy [[id number]]  (Normal) Collected: 09/25/21 0547    Lab Status: Final result Specimen: Blood from Arm, Left Updated: 09/25/21 0617     Preg, Serum Negative    Troponin I [[id number]]  (Normal) Collected: 09/25/21 0547    Lab Status: Final result Specimen: Blood from Arm, Left Updated: 09/25/21 0613     Troponin I <0 03 ng/mL     D-dimer, quantitative [[id number]]  (Normal) Collected: 09/25/21 0547    Lab Status: Final result Specimen: Blood from Arm, Left Updated: 09/25/21 0608     D-Dimer, Quant <0 27 ug/ml FEU     CBC and differential [[id number]]  (Abnormal) Collected: 09/25/21 0547    Lab Status: Final result Specimen: Blood from Arm, Left Updated: 09/25/21 0556     WBC 12 60 Thousand/uL      RBC 4 34 Million/uL      Hemoglobin 12 5 g/dL      Hematocrit 37 0 %      MCV 85 fL      MCH 28 8 pg      MCHC 33 7 g/dL      RDW 13 7 %      MPV 6 9 fL      Platelets 429 Thousands/uL      Neutrophils Relative 47 %      Lymphocytes Relative 43 %      Monocytes Relative 8 %      Eosinophils Relative 1 %      Basophils Relative 0 %      Neutrophils Absolute 5 90 Thousands/µL      Lymphocytes Absolute 5 50 Thousands/µL      Monocytes Absolute 1 00 Thousand/µL      Eosinophils Absolute 0 20 Thousand/µL      Basophils Absolute 0 00 Thousands/µL                  XR chest 2 views    (Results Pending)   XR hip/pelv 2-3 vws left if performed    (Results Pending)   XR hip/pelv 2-3 vws right if performed    (Results Pending)              Procedures  ECG 12 Lead Documentation Only    Date/Time: 9/25/2021 5:41 AM  Performed by: Ej Minaya DO  Authorized by: Ej Minaya DO     Indications / Diagnosis:  Chest pain  Patient location:  ED  Interpretation:     Interpretation: normal    Rate:     ECG rate:  88    ECG rate assessment: normal    Rhythm:     Rhythm: sinus rhythm    Ectopy:     Ectopy: none    QRS:     QRS axis:  Normal    QRS intervals:  Normal  Conduction:     Conduction: normal    ST segments:     ST segments:  Normal  T waves:     T waves: normal               ED Course  ED Course as of Sep 25 0706   Sat Sep 25, 2021   9126 Currently on a short course of steroids   WBC(!): 12 60   0608 D-Dimer, Quant: <0 27   0616 Troponin I: <0 03   0619 PREGNANCY, SERUM: Negative             HEART Risk Score      Most Recent Value   Heart Score Risk Calculator   History  0 Filed at: 09/25/2021 0706   ECG  0 Filed at: 09/25/2021 0636   Age  0 Filed at: 09/25/2021 9034   Risk Factors  1 Filed at: 09/25/2021 0706   Troponin  0 Filed at: 09/25/2021 0706   HEART Score  1 Filed at: 09/25/2021 8723                      SBIRT 22yo+      Most Recent Value   SBIRT (25 yo +)   In order to provide better care to our patients, we are screening all of our patients for alcohol and drug use  Would it be okay to ask you these screening questions? Yes Filed at: 09/25/2021 3052   Initial Alcohol Screen: US AUDIT-C    1  How often do you have a drink containing alcohol?  0 Filed at: 09/25/2021 0513   2  How many drinks containing alcohol do you have on a typical day you are drinking? 0 Filed at: 09/25/2021 0513   3a  Male UNDER 65: How often do you have five or more drinks on one occasion? 0 Filed at: 09/25/2021 0513   3b  FEMALE Any Age, or MALE 65+: How often do you have 4 or more drinks on one occassion? 0 Filed at: 09/25/2021 0513   Audit-C Score  0 Filed at: 09/25/2021 5818   BRUCE: How many times in the past year have you    Used an illegal drug or used a prescription medication for non-medical reasons? Never Filed at: 09/25/2021 9283                    MDM  Number of Diagnoses or Management Options  Bilateral hip pain  Chest pain  Diagnosis management comments: As miguel has pleuritic nature to chest pain and is tachycardic on evaluation will do cardiac workup including d-dimer  Patient states taht steroids usually help her flares --> administer solumedrol  D-dimer negative, ekg and trop negative  xrays without obvious injuries    Prednisone burst sent to pharmacy  Disposition  Final diagnoses:   Chest pain   Bilateral hip pain     Time reflects when diagnosis was documented in both MDM as applicable and the Disposition within this note     Time User Action Codes Description Comment    9/25/2021  7:04 AM Sinan Maxwell [R07 9] Chest pain     9/25/2021  7:04 AM Joshua Wolfe [[id number],  [id number]] Bilateral hip pain       ED Disposition     ED Disposition Condition Date/Time Comment    Discharge Stable Sat Sep 25, 2021  7:04 AM Henri Jordan discharge to home/self care              Follow-up Information     Follow up With Specialties Details Why Contact Info Additional Information Beth Rai DO Family Medicine   [facility]  [phone]       [facility], [facility]  Emergency Department Emergency Medicine Go to  As needed, If symptoms worsen [facility] [address]  [phone] [address]  Emergency Department          Patient's Medications   Discharge Prescriptions    PREDNISONE 20 MG TABLET    Take 2 tablets (40 mg total) by mouth daily for 5 days       Start Date: 9/25/2021 End Date: 9/30/2021       Order Dose: 40 mg       Quantity: 10 tablet    Refills: 0     No discharge procedures on file      PDMP Review       Value Time User    PDMP Reviewed  Yes 7/14/2021 11:56 AM Dinesh Carbajal MD          ED Provider  Electronically Signed by           Velinda Barthel, DO  09/25/21 5648

## 2021-09-27 ENCOUNTER — APPOINTMENT (OUTPATIENT)
Dept: PHYSICAL THERAPY | Age: 31
End: 2021-09-27
Payer: COMMERCIAL

## 2021-09-29 ENCOUNTER — OFFICE VISIT (OUTPATIENT)
Dept: PHYSICAL THERAPY | Age: 31
End: 2021-09-29
Payer: COMMERCIAL

## 2021-09-29 DIAGNOSIS — M25.50 ARTHRALGIA OF MULTIPLE SITES: Primary | ICD-10-CM

## 2021-09-29 PROCEDURE — 97113 AQUATIC THERAPY/EXERCISES: CPT | Performed by: PHYSICAL THERAPIST

## 2021-09-29 NOTE — PROGRESS NOTES
Daily Note     Today's date: 2021  Patient name: Mary Smith  : 1990  MRN: [id number]  Referring provider: Sophia Zhao PA-C  Dx:   Encounter Diagnosis     ICD-10-CM    1  Arthralgia of multiple sites  M25 50                   Subjective: Had a bad "flare-up" last week - started prednisone course - better today      Objective: See treatment diary below      Assessment: Tolerated treatment well  Patient would benefit from continued PT      Plan: Continue per plan of care        Precautions: fibromyalgia, chronic fatigue      Daily Treatment Diary     Manual  8/23 8/30 9/1 9/10 9/21 9/29 7/27 8/2 8/10 8/17                                                                        Exercise Diary                           Walking x5  x5 x5 x5 x5 x5 x5 x5 x5                             Seated hamstring stretch x4 hold 20 sec  x4 x4 x4 x4 x4 x4 x4  x5 x5   Seated piriformis stretch x4 hold 20 sec  x4 x4 x4 x4 x4 x4 x4  x5 x5                Standing hip flexion 2x10 2x10 3x20 2x10 2x10 2x12 2x12 2x15 2x15 2x15   Standing hip abd 2x10 2x10 3x20 2x10 2x10 2x12 2x12 2x15 2x15 2x15                Standing knee flexion 2x15 2x15 3x20 2x15 2x15 2x15 2x15 2x15 2x15 2x15   Step ups 2x10 2x10 2x10 2x10 2x10 2x12 - each 2x12 2x15 2x15 2x15                Pony cycling x5 minutes x5 x5 min x5 x5 x5 x5 x5 x5 x5   Pony [place] skiing x5 minutes x5 x5 min x5 x5 x5 x5 x5 x5 x5   Heel raises/toe raises 2x15 2x15 2x15 2x15 2x15 2x15 2x15 2x15 2x15 2x15   Heel-to-toe balance x10 reps each x10 x10 x10 x10 x10 x10 x10 x10 x10   Pony 3x15  3x15 Hip abd/add - 3x15 3x15 3x15 3x15 3x15 3x15 3x15   Standing quad stretch   x5  x5 reps hold 30 sec  x5 x5 x5 x5 x5   Ladder squats 3x10 3x10 3x10 3x10     3x10 3x10   SLS with paddle x10 reps x10 reps np x10 reps     x10 reps hold 10 sec x10                                              Modalities

## 2021-10-01 ENCOUNTER — SOCIAL WORK (OUTPATIENT)
Dept: BEHAVIORAL/MENTAL HEALTH CLINIC | Facility: CLINIC | Age: 31
End: 2021-10-01
Payer: COMMERCIAL

## 2021-10-01 DIAGNOSIS — F41.1 GENERALIZED ANXIETY DISORDER: Chronic | ICD-10-CM

## 2021-10-01 DIAGNOSIS — F33.1 DEPRESSION, MAJOR, RECURRENT, MODERATE (HCC): Primary | Chronic | ICD-10-CM

## 2021-10-01 PROCEDURE — 90834 PSYTX W PT 45 MINUTES: CPT | Performed by: SOCIAL WORKER

## 2021-10-04 ENCOUNTER — APPOINTMENT (OUTPATIENT)
Dept: PHYSICAL THERAPY | Age: 31
End: 2021-10-04
Payer: COMMERCIAL

## 2021-10-05 ENCOUNTER — OFFICE VISIT (OUTPATIENT)
Dept: NEUROLOGY | Facility: CLINIC | Age: 31
End: 2021-10-05
Payer: COMMERCIAL

## 2021-10-05 ENCOUNTER — HOSPITAL ENCOUNTER (EMERGENCY)
Facility: HOSPITAL | Age: 31
Discharge: HOME | End: 2021-10-05
Attending: EMERGENCY MEDICINE | Admitting: EMERGENCY MEDICINE
Payer: COMMERCIAL

## 2021-10-05 VITALS
SYSTOLIC BLOOD PRESSURE: 160 MMHG | DIASTOLIC BLOOD PRESSURE: 97 MMHG | TEMPERATURE: 98.1 F | WEIGHT: 243 LBS | RESPIRATION RATE: 18 BRPM | BODY MASS INDEX: 43.05 KG/M2 | OXYGEN SATURATION: 98 % | HEART RATE: 107 BPM

## 2021-10-05 DIAGNOSIS — G93.40 ENCEPHALOPATHY: Primary | ICD-10-CM

## 2021-10-05 DIAGNOSIS — L02.811 SCALP ABSCESS: Primary | ICD-10-CM

## 2021-10-05 DIAGNOSIS — L72.3 INFECTED SEBACEOUS CYST: ICD-10-CM

## 2021-10-05 DIAGNOSIS — L08.9 INFECTED SEBACEOUS CYST: ICD-10-CM

## 2021-10-05 DIAGNOSIS — G43.909 MIGRAINE: ICD-10-CM

## 2021-10-05 DIAGNOSIS — R41.89 COGNITIVE DECLINE: ICD-10-CM

## 2021-10-05 PROCEDURE — 96116 NUBHVL XM PHYS/QHP 1ST HR: CPT | Performed by: CLINICAL NEUROPSYCHOLOGIST

## 2021-10-05 PROCEDURE — 96121 NUBHVL XM PHY/QHP EA ADDL HR: CPT | Performed by: CLINICAL NEUROPSYCHOLOGIST

## 2021-10-05 PROCEDURE — 99282 EMERGENCY DEPT VISIT SF MDM: CPT | Performed by: EMERGENCY MEDICINE

## 2021-10-05 PROCEDURE — 96138 PSYCL/NRPSYC TECH 1ST: CPT | Performed by: CLINICAL NEUROPSYCHOLOGIST

## 2021-10-05 PROCEDURE — 99282 EMERGENCY DEPT VISIT SF MDM: CPT

## 2021-10-05 PROCEDURE — 96139 PSYCL/NRPSYC TST TECH EA: CPT | Performed by: CLINICAL NEUROPSYCHOLOGIST

## 2021-10-05 PROCEDURE — 10060 I&D ABSCESS SIMPLE/SINGLE: CPT | Performed by: EMERGENCY MEDICINE

## 2021-10-05 PROCEDURE — 96132 NRPSYC TST EVAL PHYS/QHP 1ST: CPT | Performed by: CLINICAL NEUROPSYCHOLOGIST

## 2021-10-06 ENCOUNTER — HOSPITAL ENCOUNTER (EMERGENCY)
Facility: HOSPITAL | Age: 31
Discharge: HOME | End: 2021-10-06
Attending: EMERGENCY MEDICINE
Payer: COMMERCIAL

## 2021-10-06 ENCOUNTER — TELEPHONE (OUTPATIENT)
Dept: SURGERY | Facility: CLINIC | Age: 31
End: 2021-10-06

## 2021-10-06 ENCOUNTER — APPOINTMENT (OUTPATIENT)
Dept: PHYSICAL THERAPY | Age: 31
End: 2021-10-06
Payer: COMMERCIAL

## 2021-10-06 VITALS
TEMPERATURE: 97.5 F | HEIGHT: 63 IN | OXYGEN SATURATION: 98 % | RESPIRATION RATE: 18 BRPM | BODY MASS INDEX: 42.52 KG/M2 | WEIGHT: 240 LBS | HEART RATE: 91 BPM | SYSTOLIC BLOOD PRESSURE: 151 MMHG | DIASTOLIC BLOOD PRESSURE: 86 MMHG

## 2021-10-06 DIAGNOSIS — G43.009 MIGRAINE WITHOUT AURA AND WITHOUT STATUS MIGRAINOSUS, NOT INTRACTABLE: Primary | ICD-10-CM

## 2021-10-06 PROCEDURE — 99284 EMERGENCY DEPT VISIT MOD MDM: CPT | Performed by: EMERGENCY MEDICINE

## 2021-10-06 PROCEDURE — 96372 THER/PROPH/DIAG INJ SC/IM: CPT

## 2021-10-06 PROCEDURE — 99283 EMERGENCY DEPT VISIT LOW MDM: CPT

## 2021-10-06 RX ORDER — GINSENG 100 MG
1 CAPSULE ORAL ONCE
Status: COMPLETED | OUTPATIENT
Start: 2021-10-06 | End: 2021-10-06

## 2021-10-06 RX ORDER — KETOROLAC TROMETHAMINE 30 MG/ML
60 INJECTION, SOLUTION INTRAMUSCULAR; INTRAVENOUS ONCE
Status: COMPLETED | OUTPATIENT
Start: 2021-10-06 | End: 2021-10-06

## 2021-10-06 RX ORDER — DEXAMETHASONE SODIUM PHOSPHATE 4 MG/ML
8 INJECTION, SOLUTION INTRA-ARTICULAR; INTRALESIONAL; INTRAMUSCULAR; INTRAVENOUS; SOFT TISSUE ONCE
Status: COMPLETED | OUTPATIENT
Start: 2021-10-06 | End: 2021-10-06

## 2021-10-06 RX ADMIN — DEXAMETHASONE SODIUM PHOSPHATE 8 MG: 4 INJECTION, SOLUTION INTRAMUSCULAR; INTRAVENOUS at 06:04

## 2021-10-06 RX ADMIN — KETOROLAC TROMETHAMINE 60 MG: 30 INJECTION, SOLUTION INTRAMUSCULAR; INTRAVENOUS at 06:05

## 2021-10-06 RX ADMIN — BACITRACIN 1 SMALL APPLICATION: 500 OINTMENT TOPICAL at 06:10

## 2021-10-07 ENCOUNTER — TELEPHONE (OUTPATIENT)
Dept: PSYCHIATRY | Facility: CLINIC | Age: 31
End: 2021-10-07

## 2021-10-11 ENCOUNTER — TELEPHONE (OUTPATIENT)
Dept: NEUROLOGY | Facility: CLINIC | Age: 31
End: 2021-10-11

## 2021-10-14 ENCOUNTER — OFFICE VISIT (OUTPATIENT)
Dept: FAMILY MEDICINE CLINIC | Facility: CLINIC | Age: 31
End: 2021-10-14
Payer: COMMERCIAL

## 2021-10-14 ENCOUNTER — APPOINTMENT (OUTPATIENT)
Dept: PHYSICAL THERAPY | Age: 31
End: 2021-10-14
Payer: COMMERCIAL

## 2021-10-14 VITALS
SYSTOLIC BLOOD PRESSURE: 138 MMHG | TEMPERATURE: 97.6 F | DIASTOLIC BLOOD PRESSURE: 86 MMHG | OXYGEN SATURATION: 97 % | HEIGHT: 63 IN | BODY MASS INDEX: 44.19 KG/M2 | RESPIRATION RATE: 18 BRPM | WEIGHT: 249.4 LBS | HEART RATE: 109 BPM

## 2021-10-14 DIAGNOSIS — L72.3 SEBACEOUS CYST: Primary | ICD-10-CM

## 2021-10-14 PROCEDURE — 99213 OFFICE O/P EST LOW 20 MIN: CPT | Performed by: FAMILY MEDICINE

## 2021-10-15 ENCOUNTER — SOCIAL WORK (OUTPATIENT)
Dept: BEHAVIORAL/MENTAL HEALTH CLINIC | Facility: CLINIC | Age: 31
End: 2021-10-15
Payer: COMMERCIAL

## 2021-10-15 DIAGNOSIS — F41.1 GENERALIZED ANXIETY DISORDER: Chronic | ICD-10-CM

## 2021-10-15 DIAGNOSIS — F33.1 DEPRESSION, MAJOR, RECURRENT, MODERATE (HCC): Primary | Chronic | ICD-10-CM

## 2021-10-15 PROCEDURE — 90847 FAMILY PSYTX W/PT 50 MIN: CPT | Performed by: SOCIAL WORKER

## 2021-10-18 ENCOUNTER — OFFICE VISIT (OUTPATIENT)
Dept: PHYSICAL THERAPY | Age: 31
End: 2021-10-18
Payer: COMMERCIAL

## 2021-10-18 DIAGNOSIS — M25.50 ARTHRALGIA OF MULTIPLE SITES: Primary | ICD-10-CM

## 2021-10-18 PROCEDURE — 97113 AQUATIC THERAPY/EXERCISES: CPT | Performed by: PHYSICAL THERAPY ASSISTANT

## 2021-10-19 ENCOUNTER — TELEPHONE (OUTPATIENT)
Dept: PSYCHIATRY | Facility: CLINIC | Age: 31
End: 2021-10-19

## 2021-10-20 ENCOUNTER — DOCUMENTATION (OUTPATIENT)
Dept: NEUROLOGY | Facility: CLINIC | Age: 31
End: 2021-10-20

## 2021-10-20 ENCOUNTER — OFFICE VISIT (OUTPATIENT)
Dept: BARIATRICS | Facility: CLINIC | Age: 31
End: 2021-10-20
Payer: COMMERCIAL

## 2021-10-20 VITALS
WEIGHT: 246.6 LBS | RESPIRATION RATE: 16 BRPM | DIASTOLIC BLOOD PRESSURE: 78 MMHG | HEART RATE: 99 BPM | HEIGHT: 64 IN | BODY MASS INDEX: 42.1 KG/M2 | TEMPERATURE: 97.3 F | SYSTOLIC BLOOD PRESSURE: 130 MMHG

## 2021-10-20 DIAGNOSIS — E16.1 HYPERINSULINEMIA: ICD-10-CM

## 2021-10-20 DIAGNOSIS — E66.01 MORBID OBESITY WITH BMI OF 40.0-44.9, ADULT (HCC): Primary | ICD-10-CM

## 2021-10-20 DIAGNOSIS — Z79.899 MEDICATION MANAGEMENT: ICD-10-CM

## 2021-10-20 DIAGNOSIS — F33.1 DEPRESSION, MAJOR, RECURRENT, MODERATE (HCC): Chronic | ICD-10-CM

## 2021-10-20 PROCEDURE — 99214 OFFICE O/P EST MOD 30 MIN: CPT | Performed by: PHYSICIAN ASSISTANT

## 2021-10-20 RX ORDER — METFORMIN HYDROCHLORIDE 500 MG/1
500 TABLET, EXTENDED RELEASE ORAL
Qty: 30 TABLET | Refills: 2 | Status: SHIPPED | OUTPATIENT
Start: 2021-10-20 | End: 2021-12-20 | Stop reason: SDUPTHER

## 2021-10-21 ENCOUNTER — PROCEDURE VISIT (OUTPATIENT)
Dept: NEUROLOGY | Facility: CLINIC | Age: 31
End: 2021-10-21
Payer: COMMERCIAL

## 2021-10-21 VITALS — TEMPERATURE: 96 F | SYSTOLIC BLOOD PRESSURE: 134 MMHG | DIASTOLIC BLOOD PRESSURE: 76 MMHG

## 2021-10-21 DIAGNOSIS — G43.709 CHRONIC MIGRAINE WITHOUT AURA WITHOUT STATUS MIGRAINOSUS, NOT INTRACTABLE: Primary | ICD-10-CM

## 2021-10-21 PROCEDURE — 64615 CHEMODENERV MUSC MIGRAINE: CPT | Performed by: PHYSICIAN ASSISTANT

## 2021-10-26 ENCOUNTER — APPOINTMENT (OUTPATIENT)
Dept: PHYSICAL THERAPY | Age: 31
End: 2021-10-26
Payer: COMMERCIAL

## 2021-10-28 ENCOUNTER — OFFICE VISIT (OUTPATIENT)
Dept: PHYSICAL THERAPY | Age: 31
End: 2021-10-28
Payer: COMMERCIAL

## 2021-10-28 DIAGNOSIS — M25.50 ARTHRALGIA OF MULTIPLE SITES: Primary | ICD-10-CM

## 2021-10-28 PROCEDURE — 97113 AQUATIC THERAPY/EXERCISES: CPT | Performed by: PHYSICAL THERAPIST

## 2021-10-29 ENCOUNTER — SOCIAL WORK (OUTPATIENT)
Dept: BEHAVIORAL/MENTAL HEALTH CLINIC | Facility: CLINIC | Age: 31
End: 2021-10-29
Payer: COMMERCIAL

## 2021-10-29 DIAGNOSIS — F41.1 GENERALIZED ANXIETY DISORDER: Chronic | ICD-10-CM

## 2021-10-29 DIAGNOSIS — F33.1 DEPRESSION, MAJOR, RECURRENT, MODERATE (HCC): Primary | Chronic | ICD-10-CM

## 2021-10-29 PROCEDURE — 90834 PSYTX W PT 45 MINUTES: CPT | Performed by: SOCIAL WORKER

## 2021-11-02 ENCOUNTER — OFFICE VISIT (OUTPATIENT)
Dept: PHYSICAL THERAPY | Age: 31
End: 2021-11-02
Payer: COMMERCIAL

## 2021-11-02 DIAGNOSIS — M25.50 ARTHRALGIA OF MULTIPLE SITES: Primary | ICD-10-CM

## 2021-11-02 PROCEDURE — 97113 AQUATIC THERAPY/EXERCISES: CPT | Performed by: PHYSICAL THERAPIST

## 2021-11-04 ENCOUNTER — OFFICE VISIT (OUTPATIENT)
Dept: PHYSICAL THERAPY | Age: 31
End: 2021-11-04
Payer: COMMERCIAL

## 2021-11-04 DIAGNOSIS — M25.50 ARTHRALGIA OF MULTIPLE SITES: Primary | ICD-10-CM

## 2021-11-04 PROCEDURE — 97113 AQUATIC THERAPY/EXERCISES: CPT | Performed by: PHYSICAL THERAPIST

## 2021-11-05 ENCOUNTER — APPOINTMENT (EMERGENCY)
Dept: NON INVASIVE DIAGNOSTICS | Facility: HOSPITAL | Age: 31
End: 2021-11-05
Payer: COMMERCIAL

## 2021-11-05 ENCOUNTER — HOSPITAL ENCOUNTER (EMERGENCY)
Facility: HOSPITAL | Age: 31
Discharge: HOME | End: 2021-11-05
Attending: FAMILY MEDICINE | Admitting: FAMILY MEDICINE
Payer: COMMERCIAL

## 2021-11-05 VITALS
SYSTOLIC BLOOD PRESSURE: 133 MMHG | WEIGHT: 240 LBS | RESPIRATION RATE: 20 BRPM | BODY MASS INDEX: 42.52 KG/M2 | DIASTOLIC BLOOD PRESSURE: 103 MMHG | OXYGEN SATURATION: 97 % | HEIGHT: 63 IN | HEART RATE: 105 BPM | TEMPERATURE: 97.8 F

## 2021-11-05 DIAGNOSIS — M79.605 LEFT LEG PAIN: Primary | ICD-10-CM

## 2021-11-05 PROCEDURE — 99284 EMERGENCY DEPT VISIT MOD MDM: CPT | Performed by: FAMILY MEDICINE

## 2021-11-05 PROCEDURE — 93971 EXTREMITY STUDY: CPT

## 2021-11-05 PROCEDURE — 93971 EXTREMITY STUDY: CPT | Performed by: SURGERY

## 2021-11-05 PROCEDURE — 99283 EMERGENCY DEPT VISIT LOW MDM: CPT

## 2021-11-09 ENCOUNTER — APPOINTMENT (OUTPATIENT)
Dept: PHYSICAL THERAPY | Age: 31
End: 2021-11-09
Payer: COMMERCIAL

## 2021-11-11 ENCOUNTER — OFFICE VISIT (OUTPATIENT)
Dept: PHYSICAL THERAPY | Age: 31
End: 2021-11-11
Payer: COMMERCIAL

## 2021-11-11 DIAGNOSIS — M25.50 ARTHRALGIA OF MULTIPLE SITES: Primary | ICD-10-CM

## 2021-11-11 PROCEDURE — 97113 AQUATIC THERAPY/EXERCISES: CPT | Performed by: PHYSICAL THERAPIST

## 2021-11-12 ENCOUNTER — TELEMEDICINE (OUTPATIENT)
Dept: BEHAVIORAL/MENTAL HEALTH CLINIC | Facility: CLINIC | Age: 31
End: 2021-11-12
Payer: COMMERCIAL

## 2021-11-12 DIAGNOSIS — F41.1 GENERALIZED ANXIETY DISORDER: Chronic | ICD-10-CM

## 2021-11-12 DIAGNOSIS — F33.1 DEPRESSION, MAJOR, RECURRENT, MODERATE (HCC): Primary | Chronic | ICD-10-CM

## 2021-11-12 PROCEDURE — 90834 PSYTX W PT 45 MINUTES: CPT | Performed by: SOCIAL WORKER

## 2021-11-18 ENCOUNTER — TELEMEDICINE (OUTPATIENT)
Dept: BEHAVIORAL/MENTAL HEALTH CLINIC | Facility: CLINIC | Age: 31
End: 2021-11-18
Payer: COMMERCIAL

## 2021-11-18 DIAGNOSIS — F33.1 DEPRESSION, MAJOR, RECURRENT, MODERATE (HCC): Primary | Chronic | ICD-10-CM

## 2021-11-18 DIAGNOSIS — F41.1 GENERALIZED ANXIETY DISORDER: Chronic | ICD-10-CM

## 2021-11-18 PROCEDURE — 90834 PSYTX W PT 45 MINUTES: CPT | Performed by: SOCIAL WORKER

## 2021-11-21 ENCOUNTER — HOSPITAL ENCOUNTER (EMERGENCY)
Facility: HOSPITAL | Age: 31
Discharge: HOME | End: 2021-11-21
Attending: EMERGENCY MEDICINE
Payer: COMMERCIAL

## 2021-11-21 VITALS
SYSTOLIC BLOOD PRESSURE: 142 MMHG | RESPIRATION RATE: 16 BRPM | HEIGHT: 63 IN | HEART RATE: 82 BPM | OXYGEN SATURATION: 97 % | WEIGHT: 250 LBS | TEMPERATURE: 97.5 F | DIASTOLIC BLOOD PRESSURE: 113 MMHG | BODY MASS INDEX: 44.3 KG/M2

## 2021-11-21 DIAGNOSIS — G43.909 MIGRAINE HEADACHE: Primary | ICD-10-CM

## 2021-11-21 PROCEDURE — 99284 EMERGENCY DEPT VISIT MOD MDM: CPT | Performed by: EMERGENCY MEDICINE

## 2021-11-21 PROCEDURE — 99283 EMERGENCY DEPT VISIT LOW MDM: CPT

## 2021-11-21 PROCEDURE — 96375 TX/PRO/DX INJ NEW DRUG ADDON: CPT

## 2021-11-21 PROCEDURE — 96374 THER/PROPH/DIAG INJ IV PUSH: CPT

## 2021-11-21 PROCEDURE — 96361 HYDRATE IV INFUSION ADD-ON: CPT

## 2021-11-21 PROCEDURE — 93005 ELECTROCARDIOGRAM TRACING: CPT

## 2021-11-21 RX ORDER — KETOROLAC TROMETHAMINE 30 MG/ML
15 INJECTION, SOLUTION INTRAMUSCULAR; INTRAVENOUS ONCE
Status: COMPLETED | OUTPATIENT
Start: 2021-11-21 | End: 2021-11-21

## 2021-11-21 RX ORDER — DIPHENHYDRAMINE HYDROCHLORIDE 50 MG/ML
25 INJECTION INTRAMUSCULAR; INTRAVENOUS ONCE
Status: COMPLETED | OUTPATIENT
Start: 2021-11-21 | End: 2021-11-21

## 2021-11-21 RX ORDER — METOCLOPRAMIDE HYDROCHLORIDE 5 MG/ML
10 INJECTION INTRAMUSCULAR; INTRAVENOUS ONCE
Status: COMPLETED | OUTPATIENT
Start: 2021-11-21 | End: 2021-11-21

## 2021-11-21 RX ORDER — DEXAMETHASONE SODIUM PHOSPHATE 4 MG/ML
8 INJECTION, SOLUTION INTRA-ARTICULAR; INTRALESIONAL; INTRAMUSCULAR; INTRAVENOUS; SOFT TISSUE ONCE
Status: COMPLETED | OUTPATIENT
Start: 2021-11-21 | End: 2021-11-21

## 2021-11-21 RX ADMIN — SODIUM CHLORIDE 1000 ML: 0.9 INJECTION, SOLUTION INTRAVENOUS at 11:43

## 2021-11-21 RX ADMIN — DEXAMETHASONE SODIUM PHOSPHATE 8 MG: 4 INJECTION, SOLUTION INTRAMUSCULAR; INTRAVENOUS at 11:50

## 2021-11-21 RX ADMIN — METOCLOPRAMIDE 10 MG: 5 INJECTION, SOLUTION INTRAMUSCULAR; INTRAVENOUS at 11:47

## 2021-11-21 RX ADMIN — DIPHENHYDRAMINE HYDROCHLORIDE 25 MG: 50 INJECTION INTRAMUSCULAR; INTRAVENOUS at 11:44

## 2021-11-21 RX ADMIN — KETOROLAC TROMETHAMINE 15 MG: 30 INJECTION, SOLUTION INTRAMUSCULAR at 11:46

## 2021-11-22 LAB
ATRIAL RATE: 96 BPM
P AXIS: 41 DEGREES
PR INTERVAL: 158 MS
QRS AXIS: -4 DEGREES
QRSD INTERVAL: 82 MS
QT INTERVAL: 360 MS
QTC INTERVAL: 454 MS
T WAVE AXIS: 22 DEGREES
VENTRICULAR RATE: 96 BPM

## 2021-11-22 PROCEDURE — 93010 ELECTROCARDIOGRAM REPORT: CPT | Performed by: INTERNAL MEDICINE

## 2021-11-23 ENCOUNTER — LAB (OUTPATIENT)
Dept: LAB | Facility: CLINIC | Age: 31
End: 2021-11-23
Payer: COMMERCIAL

## 2021-11-23 DIAGNOSIS — B00.9 DISEASE DUE TO ALPHAHERPESVIRINAE: ICD-10-CM

## 2021-11-23 DIAGNOSIS — B00.9 HSV-2 (HERPES SIMPLEX VIRUS 2) INFECTION: ICD-10-CM

## 2021-11-23 PROCEDURE — 86696 HERPES SIMPLEX TYPE 2 TEST: CPT

## 2021-11-23 PROCEDURE — 86695 HERPES SIMPLEX TYPE 1 TEST: CPT

## 2021-11-23 PROCEDURE — 36415 COLL VENOUS BLD VENIPUNCTURE: CPT

## 2021-11-24 ENCOUNTER — TELEMEDICINE (OUTPATIENT)
Dept: BEHAVIORAL/MENTAL HEALTH CLINIC | Facility: CLINIC | Age: 31
End: 2021-11-24
Payer: COMMERCIAL

## 2021-11-24 DIAGNOSIS — F33.1 DEPRESSION, MAJOR, RECURRENT, MODERATE (HCC): Primary | Chronic | ICD-10-CM

## 2021-11-24 DIAGNOSIS — F41.1 GENERALIZED ANXIETY DISORDER: Chronic | ICD-10-CM

## 2021-11-24 LAB
HSV1 IGG SER IA-ACNC: 26.8 INDEX (ref 0–0.9)
HSV2 IGG SER IA-ACNC: <0.91 INDEX (ref 0–0.9)

## 2021-11-24 PROCEDURE — 90834 PSYTX W PT 45 MINUTES: CPT | Performed by: SOCIAL WORKER

## 2021-11-30 ENCOUNTER — OFFICE VISIT (OUTPATIENT)
Dept: PHYSICAL THERAPY | Age: 31
End: 2021-11-30
Payer: COMMERCIAL

## 2021-11-30 DIAGNOSIS — M25.50 ARTHRALGIA OF MULTIPLE SITES: Primary | ICD-10-CM

## 2021-11-30 PROCEDURE — 97113 AQUATIC THERAPY/EXERCISES: CPT | Performed by: SPECIALIST/TECHNOLOGIST

## 2021-12-08 ENCOUNTER — OFFICE VISIT (OUTPATIENT)
Dept: FAMILY MEDICINE CLINIC | Facility: CLINIC | Age: 31
End: 2021-12-08
Payer: COMMERCIAL

## 2021-12-08 VITALS
OXYGEN SATURATION: 98 % | DIASTOLIC BLOOD PRESSURE: 86 MMHG | HEART RATE: 104 BPM | WEIGHT: 252 LBS | HEIGHT: 63 IN | SYSTOLIC BLOOD PRESSURE: 140 MMHG | BODY MASS INDEX: 44.65 KG/M2 | TEMPERATURE: 97.5 F

## 2021-12-08 DIAGNOSIS — H53.8 BLURRED VISION: Primary | ICD-10-CM

## 2021-12-08 DIAGNOSIS — E66.01 MORBID OBESITY WITH BMI OF 40.0-44.9, ADULT (HCC): ICD-10-CM

## 2021-12-08 DIAGNOSIS — I10 PRIMARY HYPERTENSION: ICD-10-CM

## 2021-12-08 DIAGNOSIS — E78.2 MIXED HYPERLIPIDEMIA: ICD-10-CM

## 2021-12-08 PROCEDURE — 99214 OFFICE O/P EST MOD 30 MIN: CPT | Performed by: FAMILY MEDICINE

## 2021-12-08 RX ORDER — ETANERCEPT 50 MG/ML
50 SOLUTION SUBCUTANEOUS WEEKLY
COMMUNITY
Start: 2021-11-20 | End: 2022-03-15

## 2021-12-08 RX ORDER — OLMESARTAN MEDOXOMIL 20 MG/1
20 TABLET ORAL DAILY
Qty: 30 TABLET | Refills: 2 | Status: SHIPPED | OUTPATIENT
Start: 2021-12-08 | End: 2022-03-15

## 2021-12-10 ENCOUNTER — TELEPHONE (OUTPATIENT)
Dept: PSYCHIATRY | Facility: CLINIC | Age: 31
End: 2021-12-10

## 2021-12-10 ENCOUNTER — TELEMEDICINE (OUTPATIENT)
Dept: BEHAVIORAL/MENTAL HEALTH CLINIC | Facility: CLINIC | Age: 31
End: 2021-12-10
Payer: COMMERCIAL

## 2021-12-10 ENCOUNTER — TELEMEDICINE (OUTPATIENT)
Dept: PSYCHIATRY | Facility: CLINIC | Age: 31
End: 2021-12-10
Payer: COMMERCIAL

## 2021-12-10 DIAGNOSIS — F33.1 DEPRESSION, MAJOR, RECURRENT, MODERATE (HCC): Primary | Chronic | ICD-10-CM

## 2021-12-10 DIAGNOSIS — M79.7 FIBROMYALGIA: ICD-10-CM

## 2021-12-10 DIAGNOSIS — F41.1 GENERALIZED ANXIETY DISORDER: Chronic | ICD-10-CM

## 2021-12-10 DIAGNOSIS — F41.1 GENERALIZED ANXIETY DISORDER: ICD-10-CM

## 2021-12-10 PROCEDURE — 90834 PSYTX W PT 45 MINUTES: CPT | Performed by: SOCIAL WORKER

## 2021-12-10 PROCEDURE — 99213 OFFICE O/P EST LOW 20 MIN: CPT | Performed by: PSYCHIATRY & NEUROLOGY

## 2021-12-10 RX ORDER — ARIPIPRAZOLE 2 MG/1
2 TABLET ORAL DAILY
Qty: 30 TABLET | Refills: 2 | Status: SHIPPED | OUTPATIENT
Start: 2021-12-10 | End: 2022-05-09 | Stop reason: SDUPTHER

## 2021-12-10 RX ORDER — BUPROPION HYDROCHLORIDE 150 MG/1
150 TABLET ORAL DAILY
Qty: 30 TABLET | Refills: 2 | Status: SHIPPED | OUTPATIENT
Start: 2021-12-10 | End: 2022-05-18 | Stop reason: SDUPTHER

## 2021-12-10 RX ORDER — ALPRAZOLAM 0.5 MG/1
0.5 TABLET ORAL DAILY PRN
Qty: 30 TABLET | Refills: 2 | Status: SHIPPED | OUTPATIENT
Start: 2021-12-10 | End: 2022-03-15 | Stop reason: SDUPTHER

## 2021-12-10 RX ORDER — ESCITALOPRAM OXALATE 20 MG/1
20 TABLET ORAL DAILY
Qty: 30 TABLET | Refills: 2 | Status: SHIPPED | OUTPATIENT
Start: 2021-12-10 | End: 2022-05-18 | Stop reason: SDUPTHER

## 2021-12-20 ENCOUNTER — OFFICE VISIT (OUTPATIENT)
Dept: BARIATRICS | Facility: CLINIC | Age: 31
End: 2021-12-20
Payer: COMMERCIAL

## 2021-12-20 VITALS
WEIGHT: 249.2 LBS | HEART RATE: 95 BPM | DIASTOLIC BLOOD PRESSURE: 78 MMHG | TEMPERATURE: 97.7 F | SYSTOLIC BLOOD PRESSURE: 127 MMHG | RESPIRATION RATE: 14 BRPM | BODY MASS INDEX: 42.55 KG/M2 | HEIGHT: 64 IN

## 2021-12-20 DIAGNOSIS — I10 PRIMARY HYPERTENSION: ICD-10-CM

## 2021-12-20 DIAGNOSIS — E66.01 MORBID OBESITY WITH BMI OF 40.0-44.9, ADULT (HCC): Primary | ICD-10-CM

## 2021-12-20 DIAGNOSIS — G43.101: ICD-10-CM

## 2021-12-20 DIAGNOSIS — F41.1 GENERALIZED ANXIETY DISORDER: Chronic | ICD-10-CM

## 2021-12-20 DIAGNOSIS — E16.1 HYPERINSULINEMIA: ICD-10-CM

## 2021-12-20 DIAGNOSIS — K21.9 GASTROESOPHAGEAL REFLUX DISEASE WITHOUT ESOPHAGITIS: ICD-10-CM

## 2021-12-20 PROCEDURE — 99214 OFFICE O/P EST MOD 30 MIN: CPT | Performed by: PHYSICIAN ASSISTANT

## 2021-12-20 RX ORDER — METFORMIN HYDROCHLORIDE 500 MG/1
1000 TABLET, EXTENDED RELEASE ORAL
Qty: 30 TABLET | Refills: 2 | Status: SHIPPED | OUTPATIENT
Start: 2021-12-20 | End: 2022-02-23 | Stop reason: SDUPTHER

## 2021-12-23 ENCOUNTER — TELEMEDICINE (OUTPATIENT)
Dept: BEHAVIORAL/MENTAL HEALTH CLINIC | Facility: CLINIC | Age: 31
End: 2021-12-23
Payer: COMMERCIAL

## 2021-12-23 DIAGNOSIS — F41.1 GENERALIZED ANXIETY DISORDER: Chronic | ICD-10-CM

## 2021-12-23 DIAGNOSIS — F33.1 DEPRESSION, MAJOR, RECURRENT, MODERATE (HCC): Primary | Chronic | ICD-10-CM

## 2021-12-23 PROCEDURE — 90834 PSYTX W PT 45 MINUTES: CPT | Performed by: SOCIAL WORKER

## 2022-01-10 ENCOUNTER — TELEMEDICINE (OUTPATIENT)
Dept: BEHAVIORAL/MENTAL HEALTH CLINIC | Facility: CLINIC | Age: 32
End: 2022-01-10
Payer: COMMERCIAL

## 2022-01-10 DIAGNOSIS — F41.1 GENERALIZED ANXIETY DISORDER: Chronic | ICD-10-CM

## 2022-01-10 DIAGNOSIS — F33.1 DEPRESSION, MAJOR, RECURRENT, MODERATE (HCC): Primary | Chronic | ICD-10-CM

## 2022-01-10 PROCEDURE — 90834 PSYTX W PT 45 MINUTES: CPT | Performed by: SOCIAL WORKER

## 2022-01-10 NOTE — PSYCH
Virtual Regular Visit    Verification of patient location:    Patient is located in the following state in which I hold an active license [place]    Assessment/Plan:    Problem List Items Addressed This Visit        Other    Generalized anxiety disorder (Chronic)    Depression, major, recurrent, moderate ([address] ) - Primary (Chronic)        Goals addressed in session: Goal 1      Reason for visit is   Chief Complaint   Patient presents with    Virtual Regular Visit    Virtual Regular Visit      Encounter provider APARNA Orozco Sa    Provider located at [address] [place] [id number] [phone]    Recent Visits  Date Type Provider Dept   01/10/22 [address], [address] [facility]   Showing recent visits within past 7 days and meeting all other requirements  Future Appointments  No visits were found meeting these conditions  Showing future appointments within next 150 days and meeting all other requirements     The patient was identified by name and date of birth  Kelby Augustine was informed that this is a telemedicine visit and that the visit is being conducted throughEpic Embedded and patient was informed this is a secure, HIPAA-complaint platform  She agrees to proceed     My office door was closed  No one else was in the room  She acknowledged consent and understanding of privacy and security of the video platform  The patient has agreed to participate and understands they can discontinue the visit at any time  Patient is aware this is a billable service  Yuri Cheikhmicah is a 28 y o  female  DATA: Met with Stephanie for scheduled individual session  Topics of discussion included family stressors, relationships with family, physical health concerns and relationship with SO  "I have been sick   I think it's from my Enbrel " Baird Curtis states that she has been trying to be active, but her medical symptoms have been preventing her from doing more than just the basics  She states that she was sick for about 3 weeks  Feliz Stark states that she is working on some crafting, which is improving her mood  Feliz Stark states that her boyfriend left and moved away with his mother  She states that they have been talking with one another every day  "I don't think I'm really dealing with it, because I've been so focused on being sick " Feliz Stark states that she plans to go for a visit in the first week in February  Feliz Stark discussed her relationships with her family  She states that her uncle is not leaving his house, unless he absolutely needs to  She states that he is not speaking with her at all  Feliz Jeonglona has had video chats with her grandmother, and her grandmother is worried about Stephanie's physical health  Her grandmother is now done with her home-care nursing visits  Feliz Stark states that this is putting more stress on her mother-- who continues to work and is also caring for Stephanie's grandmother  Feliz Stark states that she feels some guilt regarding not being available to help with her grandmother's care  Feliz Stark states that her role in the family is to  the groceries from the store and to wipe them down, before giving them to her grandmother, mother, and uncle  She states that she and her mother are getting along well  Feliz Stark states that she continues to have daily discussions with her mother  Feliz Stark states that she cancelled her weight management surgical evaluation, due to feeling sick  She states that she is very conflicted about her options  "Nobody wants me to get the surgery " We spent some time talking about intuitive eating and the basic practices  She agrees to begin to check in with her body before, during, and after eating  Client shows evidence of utilizing emotion regulation skills and distress tolerance skills skills to manage mental health symptoms   During this session, this clinician used the following therapeutic modalities: supportive psychotherapy, client-centered therapy, mindfulness-based strategies, DBT-informed skills, Motivational Interviewing and solution-focused therapy  ASSESSMENT: Betty Talavera presents with a normal mood  Her affect is normal range and intensity, appropriate  Betty Talavera exhibits good therapeutic rapport with this clinician  Betty Talavera continues to exhibit willingness to work on treatment goals and objectives  Betty Talavera presents with a minimal risk of suicide, minimal risk of self-harm, and minimal risk of harm to others  PLAN: Betty Talavera will return in two weeks for the next scheduled session  Between sessions, Betty Talavera will speak with her doctor regarding her physical health concerns  She will begin to consider her body's responses to food  In addition, Betty Talavera will continue to work on her communication with her friends/family/SO  She will continue to set/maintain appropriate boundaries with her friends and family  She will report back during the next session re: successes and barriers  At the next session, this clinician will use supportive psychotherapy, client-centered therapy, mindfulness-based strategies, DBT-informed skills, Motivational Interviewing and solution-focused therapy to address her mood regulation and relationship concerns, in an effort to assist Betty Talavera with meeting treatment goals  HPI     Past Medical History:   Diagnosis Date    Abnormal Pap smear of cervix     Anxiety     Arthritis     Depression     Fibromyalgia, primary     IBS (irritable bowel syndrome)     Migraine     Obesity     Vitamin B12 deficiency        Past Surgical History:   Procedure Laterality Date    COLONOSCOPY N/A 5/8/2018    Procedure: COLONOSCOPY;  Surgeon: Sweta Null MD;  Location: [facility];   Service: Gastroenterology    HI EXC SKIN BENIG >4 CM REMAINDR BODY N/A 7/1/2021    Procedure: EXCISION OF PILAR SCALP CYST X 2 AND RIGHT INNER GROIN NEVVUS; Surgeon: Sirisha Mauricio MD;  Location: [facility];  Service: Plastics    FL RECMPL WND SCALP,EXTR 2 6-7 5 CM N/A 7/1/2021    Procedure: CLOSURE WOUND SCALP X 2 AND RIGHT INNER GROIN;  Surgeon: Sirisha Mauricio MD;  Location: [facility];  Service: Plastics    TONSILLECTOMY      WISDOM TOOTH EXTRACTION         Current Outpatient Medications   Medication Sig Dispense Refill    acetaminophen (TYLENOL) 500 mg tablet Take 2 tablets (1,000 mg total) by mouth every 6 (six) hours as needed for mild pain or moderate pain (Patient not taking: Reported on 12/20/2021 ) 100 tablet 0    Aimovig 140 MG/ML SOAJ Inject 140mg under the skin every 30 (thirty) days  1 pen 10    ALPRAZolam (XANAX) 0 5 mg tablet Take 1 tablet (0 5 mg total) by mouth daily as needed for anxiety 30 tablet 2    ARIPiprazole (ABILIFY) 2 mg tablet Take 1 tablet (2 mg total) by mouth daily 30 tablet 2    B-D 3CC LUER-DEANN SYR 25GX1" 25G X 1" 3 ML MISC       buPROPion (WELLBUTRIN XL) 150 mg 24 hr tablet Take 1 tablet (150 mg total) by mouth daily 30 tablet 2    Enbrel SureClick 50 MG/ML injection Inject 50 mg under the skin once a week        escitalopram (LEXAPRO) 20 mg tablet Take 1 tablet (20 mg total) by mouth daily 30 tablet 2    etonogestrel-ethinyl estradiol (NuvaRing) 0 12-0 015 MG/24HR vaginal ring Insert ring for 21 days then remove for one week  3 each 3    ketorolac (TORADOL) 10 mg tablet Take 1 tablet (10 mg total) by mouth every 6 (six) hours as needed (migraine) Max 2-3 per week   10 tablet 0    melatonin 1 mg 1-2 tabs qhs prn insomnia 60 tablet 2    metFORMIN (GLUCOPHAGE-XR) 500 mg 24 hr tablet Take 2 tablets (1,000 mg total) by mouth daily with breakfast 30 tablet 2    olmesartan (BENICAR) 20 mg tablet Take 1 tablet (20 mg total) by mouth daily 30 tablet 2    onabotulinumtoxin A (BOTOX) 100 units Inject as directed      pantoprazole (PROTONIX) 40 mg tablet Take 1 tablet (40 mg total) by mouth daily before breakfast 30 tablet 5  Syringe/Needle, Disp, (SYRINGE 3CC/80VH8-8/4") 27G X 1-1/4" 3 ML MISC Use for IM injection of ketorolac  4 each 0    valACYclovir (VALTREX) 500 mg tablet Take 1 tablet (500 mg total) by mouth daily (Patient not taking: Reported on 12/20/2021 ) 90 tablet 2    vitamin B-12 (VITAMIN B-12) 1,000 mcg tablet Take 1,000 mcg by mouth daily        Current Facility-Administered Medications   Medication Dose Route Frequency Provider Last Rate Last Admin    cyanocobalamin injection 1,000 mcg  1,000 mcg Intramuscular Q30 Days Brissa Busman, DO   1,000 mcg at 08/03/20 6832    cyanocobalamin injection 1,000 mcg  1,000 mcg Intramuscular Q14 Days Brissa Busman, DO   1,000 mcg at 05/18/20 1146    cyanocobalamin injection 1,000 mcg  1,000 mcg Intramuscular Q30 Days Brissa Busman, DO   1,000 mcg at 09/01/20 1129        Allergies   Allergen Reactions    Desvenlafaxine      Other reaction(s): state of confusion    Valproic Acid Other (See Comments)     Other reaction(s): dilated pupils, "schizi"    Tizanidine Anxiety       Review of Systems    Video Exam    There were no vitals filed for this visit  Physical Exam     I spent 45 minutes directly with the patient during this visit    [address] verbally agrees to participate in [employer]  Pt is aware that [employer] could be limited without vital signs or the ability to perform a full hands-on physical exam  Cruz Edward understands she or the provider may request at any time to terminate the video visit and request the patient to seek care or treatment in person

## 2022-01-11 ENCOUNTER — OFFICE VISIT (OUTPATIENT)
Dept: FAMILY MEDICINE CLINIC | Facility: CLINIC | Age: 32
End: 2022-01-11
Payer: COMMERCIAL

## 2022-01-11 DIAGNOSIS — R05.9 COUGH: ICD-10-CM

## 2022-01-11 DIAGNOSIS — A09 DIARRHEA OF INFECTIOUS ORIGIN: Primary | ICD-10-CM

## 2022-01-11 PROCEDURE — U0003 INFECTIOUS AGENT DETECTION BY NUCLEIC ACID (DNA OR RNA); SEVERE ACUTE RESPIRATORY SYNDROME CORONAVIRUS 2 (SARS-COV-2) (CORONAVIRUS DISEASE [COVID-19]), AMPLIFIED PROBE TECHNIQUE, MAKING USE OF HIGH THROUGHPUT TECHNOLOGIES AS DESCRIBED BY CMS-2020-01-R: HCPCS | Performed by: FAMILY MEDICINE

## 2022-01-11 PROCEDURE — U0005 INFEC AGEN DETEC AMPLI PROBE: HCPCS | Performed by: FAMILY MEDICINE

## 2022-01-11 PROCEDURE — 99213 OFFICE O/P EST LOW 20 MIN: CPT | Performed by: FAMILY MEDICINE

## 2022-01-11 NOTE — PROGRESS NOTES
Assessment/Plan:  Patient will remain off Enbrel see if this etiology to diarrhea  Patient have stool analysis done at this time  Patient will stay well hydrated  COVID testing done at this time  Diagnoses and all orders for this visit:    Diarrhea of infectious origin  -     Stool culture; Future  -     White Blood Cells, Stool by Gram Stain; Future  -     Clostridium difficile toxin by PCR; Future  -     Ova and parasite examination; Future            Subjective:        Patient ID: Reta Ybarra is a 28 y o  female  Patient is here with nausea vomiting diarrhea as since the week Christmas  Patient did have shot of Enbrel the which was just initiated prior to symptoms  The last shot of Enbrel was this past Sunday  No fever  Patient with some postnasal drip  Minimal cough  No new extreme fatigue or body aches beyond for baseline  The following portions of the patient's history were reviewed and updated as appropriate: allergies, current medications, past family history, past medical history, past social history, past surgical history and problem list       Review of Systems   Constitutional: Negative  Negative for fever  HENT: Negative  Eyes: Negative  Respiratory: Positive for cough  Cardiovascular: Negative  Gastrointestinal: Positive for diarrhea, nausea and vomiting  Endocrine: Negative  Genitourinary: Negative  Musculoskeletal: Negative  Skin: Negative  Allergic/Immunologic: Negative  Neurological: Negative  Hematological: Negative  Psychiatric/Behavioral: Negative  Objective:      BMI Counseling: There is no height or weight on file to calculate BMI  The BMI is above normal  Nutrition recommendations include decreasing portion sizes  Exercise recommendations include exercising 3-5 times per week  Rationale for BMI follow-up plan is due to patient being overweight or obese               There were no vitals taken for this visit          Physical Exam  Vitals and nursing note reviewed  Constitutional:       General: She is not in acute distress  Appearance: Normal appearance  She is not ill-appearing, toxic-appearing or diaphoretic  HENT:      Head: Normocephalic and atraumatic  Right Ear: Tympanic membrane, ear canal and external ear normal  There is no impacted cerumen  Left Ear: Tympanic membrane, ear canal and external ear normal  There is no impacted cerumen  Nose: Nose normal  No congestion or rhinorrhea  Neck:      Vascular: No carotid bruit  Cardiovascular:      Rate and Rhythm: Normal rate and regular rhythm  Pulses: Normal pulses  Heart sounds: Normal heart sounds  No murmur heard  No friction rub  No gallop  Pulmonary:      Effort: Pulmonary effort is normal  No respiratory distress  Breath sounds: Normal breath sounds  No stridor  No wheezing, rhonchi or rales  Chest:      Chest wall: No tenderness  Abdominal:      General: Abdomen is flat  Bowel sounds are normal  There is no distension  Palpations: Abdomen is soft  Tenderness: There is no abdominal tenderness  There is no guarding or rebound  Musculoskeletal:         General: No swelling, tenderness, deformity or signs of injury  Normal range of motion  Cervical back: Normal range of motion and neck supple  No rigidity  No muscular tenderness  Right lower leg: No edema  Left lower leg: No edema  Lymphadenopathy:      Cervical: No cervical adenopathy  Skin:     General: Skin is warm and dry  Capillary Refill: Capillary refill takes less than 2 seconds  Coloration: Skin is not jaundiced  Findings: No bruising, erythema, lesion or rash  Neurological:      General: No focal deficit present  Mental Status: She is alert and oriented to person, place, and time  Cranial Nerves: No cranial nerve deficit  Sensory: No sensory deficit  Motor: No weakness  Coordination: Coordination normal       Gait: Gait normal    Psychiatric:         Mood and Affect: Mood normal          Behavior: Behavior normal          Thought Content:  Thought content normal          Judgment: Judgment normal

## 2022-01-13 LAB — SARS-COV-2 RNA RESP QL NAA+PROBE: NEGATIVE

## 2022-01-14 ENCOUNTER — OFFICE VISIT (OUTPATIENT)
Dept: FAMILY MEDICINE CLINIC | Facility: CLINIC | Age: 32
End: 2022-01-14
Payer: COMMERCIAL

## 2022-01-14 VITALS — BODY MASS INDEX: 42.51 KG/M2 | WEIGHT: 249 LBS | HEIGHT: 64 IN

## 2022-01-14 DIAGNOSIS — R11.2 NON-INTRACTABLE VOMITING WITH NAUSEA: Primary | ICD-10-CM

## 2022-01-14 PROCEDURE — 99213 OFFICE O/P EST LOW 20 MIN: CPT | Performed by: FAMILY MEDICINE

## 2022-01-14 RX ORDER — PROMETHAZINE HYDROCHLORIDE 25 MG/1
25 TABLET ORAL EVERY 6 HOURS PRN
Qty: 30 TABLET | Refills: 0 | Status: SHIPPED | OUTPATIENT
Start: 2022-01-14 | End: 2022-03-15

## 2022-01-14 NOTE — PROGRESS NOTES
Assessment/Plan:  Patient use Phenergan as needed be  Patient will go for stool analysis if symptoms persist   Guidance given regarding diarrhea  Follow-up if not seeing improvement  Diagnoses and all orders for this visit:    Non-intractable vomiting with nausea  -     promethazine (PHENERGAN) 25 mg tablet; Take 1 tablet (25 mg total) by mouth every 6 (six) hours as needed for nausea or vomiting            Subjective:        Patient ID: Monalisa Bernardo is a 28 y o  female  Patient is here to follow-up vomiting and diarrhea  Patient without fever  Patient having roughly 2 bouts of diarrhea daily  No bloody stools  Patient's stools are watery  No abdominal pain  Patient vomiting at least once per day  Patient has significant nausea  Patient using Zofran with little results/improvement  The following portions of the patient's history were reviewed and updated as appropriate: allergies, current medications, past family history, past medical history, past social history, past surgical history and problem list       Review of Systems   Constitutional: Negative  HENT: Negative  Eyes: Negative  Respiratory: Negative  Cardiovascular: Negative  Gastrointestinal: Positive for diarrhea, nausea and vomiting  Endocrine: Negative  Genitourinary: Negative  Musculoskeletal: Negative  Skin: Negative  Allergic/Immunologic: Negative  Neurological: Negative  Hematological: Negative  Psychiatric/Behavioral: Negative  Objective:               Ht 5' 3 8" (1 621 m)   Wt 113 kg (249 lb)   BMI 43 01 kg/m²          Physical Exam  Vitals and nursing note reviewed  Constitutional:       General: She is not in acute distress  Appearance: Normal appearance  She is not ill-appearing, toxic-appearing or diaphoretic  HENT:      Head: Normocephalic and atraumatic  Right Ear: Tympanic membrane, ear canal and external ear normal  There is no impacted cerumen  Left Ear: Tympanic membrane, ear canal and external ear normal  There is no impacted cerumen  Nose: Nose normal  No congestion or rhinorrhea  Mouth/Throat:      Mouth: Mucous membranes are moist       Pharynx: No oropharyngeal exudate or posterior oropharyngeal erythema  Eyes:      General: No scleral icterus  Right eye: No discharge  Left eye: No discharge  Extraocular Movements: Extraocular movements intact  Conjunctiva/sclera: Conjunctivae normal       Pupils: Pupils are equal, round, and reactive to light  Neck:      Vascular: No carotid bruit  Cardiovascular:      Rate and Rhythm: Normal rate and regular rhythm  Pulses: Normal pulses  Heart sounds: Normal heart sounds  No murmur heard  No friction rub  No gallop  Pulmonary:      Effort: Pulmonary effort is normal  No respiratory distress  Breath sounds: Normal breath sounds  No stridor  No wheezing, rhonchi or rales  Chest:      Chest wall: No tenderness  Abdominal:      General: Abdomen is flat  Bowel sounds are normal  There is no distension  Palpations: Abdomen is soft  Tenderness: There is no abdominal tenderness  There is no guarding or rebound  Musculoskeletal:         General: No swelling, deformity or signs of injury  Cervical back: Normal range of motion and neck supple  No rigidity  No muscular tenderness  Right lower leg: No edema  Left lower leg: No edema  Lymphadenopathy:      Cervical: No cervical adenopathy  Skin:     General: Skin is warm and dry  Capillary Refill: Capillary refill takes less than 2 seconds  Coloration: Skin is not jaundiced  Findings: No bruising, erythema, lesion or rash  Neurological:      Mental Status: She is alert and oriented to person, place, and time  Mental status is at baseline  Cranial Nerves: No cranial nerve deficit  Sensory: No sensory deficit  Motor: No weakness        Coordination: Coordination normal       Gait: Gait normal    Psychiatric:         Mood and Affect: Mood normal          Behavior: Behavior normal          Thought Content:  Thought content normal          Judgment: Judgment normal

## 2022-01-19 ENCOUNTER — TELEPHONE (OUTPATIENT)
Dept: NEUROLOGY | Facility: CLINIC | Age: 32
End: 2022-01-19

## 2022-01-24 ENCOUNTER — TELEMEDICINE (OUTPATIENT)
Dept: BEHAVIORAL/MENTAL HEALTH CLINIC | Facility: CLINIC | Age: 32
End: 2022-01-24
Payer: COMMERCIAL

## 2022-01-24 DIAGNOSIS — F33.1 DEPRESSION, MAJOR, RECURRENT, MODERATE (HCC): Primary | Chronic | ICD-10-CM

## 2022-01-24 DIAGNOSIS — F41.1 GENERALIZED ANXIETY DISORDER: Chronic | ICD-10-CM

## 2022-01-24 PROCEDURE — 90834 PSYTX W PT 45 MINUTES: CPT | Performed by: SOCIAL WORKER

## 2022-01-24 NOTE — PSYCH
Virtual Regular Visit    Verification of patient location:    Patient is located in the following state in which I hold an active license [place]    Assessment/Plan:    Problem List Items Addressed This Visit        Other    Generalized anxiety disorder (Chronic)    Depression, major, recurrent, moderate (HCC) - Primary (Chronic)        Goals addressed in session: Goal 1      Reason for visit is No chief complaint on file  Encounter provider APARNA York    Provider located at [address] [place] [id number] [phone]    Recent Visits  No visits were found meeting these conditions  Showing recent visits within past 7 days and meeting all other requirements  Future Appointments  No visits were found meeting these conditions  Showing future appointments within next 150 days and meeting all other requirements     The patient was identified by name and date of birth  Jan Tamara was informed that this is a telemedicine visit and that the visit is being conducted through[employer] and patient was informed this is a secure, HIPAA-complaint platform  She agrees to proceed     My office door was closed  No one else was in the room  She acknowledged consent and understanding of privacy and security of the video platform  The patient has agreed to participate and understands they can discontinue the visit at any time  Patient is aware this is a billable service  Yash Jackson is a 28 y o  female  DATA: Met with Stephanie for scheduled individual session  Topics of discussion included family stressors, relationships with family, work-related stress, physical health concerns and relationship with SO  "I am feeling a little bit better, because I stopped taking the shot " Stephanie discussed her decision to stop taking Enbrel   She states that she felt physically better for about three days, before she started feeling like she was getting a fibromyalgia flare  She states that she feels "not as depressed " She states that she has been stuck in bed and stuck in the house  She states, "I feel that I can get better " She discussed her relationship with her SO and her frustration with his lack of understanding about her mental health symptoms  She states that he continues to be critical of her moods and her medical concerns  She states that they are "talking too much " She states that they "talk all day" through both text message and telephone  She states that he expressed anger about her being "in a dark place " She states that she is now feeling better, she feels that she will be able to practice her mindfulness-based strategies to improve her overall physical and mental health  She states that she is doing more crafting and is spending some time keeping in touch with her best friend (Crystal President)  Aguila Sullivannhi discussed her family relationships  Her mother continues to stay in Stephanie's grandmother's home, but they continue to talk to one another every day  Aguila Sernhi states that her grandmother continues to remain stable but is not making any significant improvements  She continues to need regular care  She states that she has had no additional contact with her uncle  Aguila Sullivannhi states that her uncle is visiting with his mother (Stephanie's grandmother) and that he and Stephanie's mother are only having limited contact  Aguila Sernhi states that her uncle was approved for disability  Aguila Sullivannhi states that she is considering her own options for work, if she is not able to get disability  She has 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 , grocery delivery) which will allow her to have flexibility to work around her physical symptoms  Client shows evidence of utilizing Mindfulness-based strategies and distress tolerance skills skills to manage mental health symptoms   During this session, this clinician used the following therapeutic modalities: supportive psychotherapy, client-centered therapy, mindfulness-based strategies, DBT-informed skills, Motivational Interviewing and solution-focused therapy  ASSESSMENT: Camilo Cornejo presents with a somewhat dysthymic mood  Her affect is normal range and intensity, appropriate  Camilo Cornejo exhibits good therapeutic rapport with this clinician  Camilo Cronejo continues to exhibit willingness to work on treatment goals and objectives  Camilo Cornejo presents with a minimal risk of suicide, minimal risk of self-harm, and minimal risk of harm to others  PLAN: Camilo Cornejo will return in 2 5 weeks for the next scheduled session  Between sessions, Camilo Cornejo will continue to work on increasing her use of mindfulness-based strategies to manage her moods and will report back during the next session re: successes and barriers  At the next session, this clinician will use supportive psychotherapy, client-centered therapy, mindfulness-based strategies, DBT-informed skills, Motivational Interviewing and solution-focused therapy to address her mood regulation and relationship concerns, in an effort to assist Camilo Cornejo with meeting treatment goals  HPI     Past Medical History:   Diagnosis Date    Abnormal Pap smear of cervix     Anxiety     Arthritis     Depression     Fibromyalgia, primary     IBS (irritable bowel syndrome)     Migraine     Obesity     Vitamin B12 deficiency        Past Surgical History:   Procedure Laterality Date    COLONOSCOPY N/A 5/8/2018    Procedure: COLONOSCOPY;  Surgeon: Angle Omalley MD;  Location: [facility] GI LAB;   Service: Gastroenterology    OR EXC SKIN BENIG >4 CM REMAINDR BODY N/A 7/1/2021    Procedure: EXCISION OF PILAR SCALP CYST X 2 AND RIGHT INNER GROIN NEVVUS;  Surgeon: Aime Barros MD;  Location: [facility] MAIN OR;  Service: Plastics    OR RECMPL WND SCALP,EXTR 2 6-7 5 CM N/A 7/1/2021    Procedure: CLOSURE WOUND SCALP X 2 AND RIGHT INNER GROIN;  Surgeon: Aime Barros MD; Location:  MAIN OR;  Service: Plastics    TONSILLECTOMY      WISDOM TOOTH EXTRACTION         Current Outpatient Medications   Medication Sig Dispense Refill    acetaminophen (TYLENOL) 500 mg tablet Take 2 tablets (1,000 mg total) by mouth every 6 (six) hours as needed for mild pain or moderate pain (Patient not taking: Reported on 12/20/2021 ) 100 tablet 0    Aimovig 140 MG/ML SOAJ Inject 140mg under the skin every 30 (thirty) days  1 pen 10    ALPRAZolam (XANAX) 0 5 mg tablet Take 1 tablet (0 5 mg total) by mouth daily as needed for anxiety 30 tablet 2    ARIPiprazole (ABILIFY) 2 mg tablet Take 1 tablet (2 mg total) by mouth daily 30 tablet 2    B-D 3CC LUER-DEANN SYR 25GX1" 25G X 1" 3 ML MISC       buPROPion (WELLBUTRIN XL) 150 mg 24 hr tablet Take 1 tablet (150 mg total) by mouth daily 30 tablet 2    Enbrel SureClick 50 MG/ML injection Inject 50 mg under the skin once a week        escitalopram (LEXAPRO) 20 mg tablet Take 1 tablet (20 mg total) by mouth daily 30 tablet 2    etonogestrel-ethinyl estradiol (NuvaRing) 0 12-0 015 MG/24HR vaginal ring Insert ring for 21 days then remove for one week  3 each 3    ketorolac (TORADOL) 10 mg tablet Take 1 tablet (10 mg total) by mouth every 6 (six) hours as needed (migraine) Max 2-3 per week   10 tablet 0    melatonin 1 mg 1-2 tabs qhs prn insomnia 60 tablet 2    metFORMIN (GLUCOPHAGE-XR) 500 mg 24 hr tablet Take 2 tablets (1,000 mg total) by mouth daily with breakfast 30 tablet 2    olmesartan (BENICAR) 20 mg tablet Take 1 tablet (20 mg total) by mouth daily 30 tablet 2    onabotulinumtoxin A (BOTOX) 100 units Inject as directed      pantoprazole (PROTONIX) 40 mg tablet Take 1 tablet (40 mg total) by mouth daily before breakfast 30 tablet 5    promethazine (PHENERGAN) 25 mg tablet Take 1 tablet (25 mg total) by mouth every 6 (six) hours as needed for nausea or vomiting 30 tablet 0    Syringe/Needle, Disp, (SYRINGE 3CC/09XN6-7/4") 27G X 1-1/4" 3 ML MISC Use for IM injection of ketorolac  4 each 0    valACYclovir (VALTREX) 500 mg tablet Take 1 tablet (500 mg total) by mouth daily (Patient not taking: Reported on 12/20/2021 ) 90 tablet 2    vitamin B-12 (VITAMIN B-12) 1,000 mcg tablet Take 1,000 mcg by mouth daily        Current Facility-Administered Medications   Medication Dose Route Frequency Provider Last Rate Last Admin    cyanocobalamin injection 1,000 mcg  1,000 mcg Intramuscular Q30 Days Jaclyn Mikes, DO   1,000 mcg at 08/03/20 7431    cyanocobalamin injection 1,000 mcg  1,000 mcg Intramuscular Q14 Days Jaclyn Mikes, DO   1,000 mcg at 05/18/20 1146    cyanocobalamin injection 1,000 mcg  1,000 mcg Intramuscular Q30 Days Jaclyn Mikes, DO   1,000 mcg at 09/01/20 1129        Allergies   Allergen Reactions    Desvenlafaxine      Other reaction(s): state of confusion    Valproic Acid Other (See Comments)     Other reaction(s): dilated pupils, "schizi"    Tizanidine Anxiety       Review of Systems    Video Exam    There were no vitals filed for this visit  Physical Exam     I spent 45 minutes directly with the patient during this visit    [address] [facility] [address] verbally agrees to participate in [employer]  Pt is aware that [employer] could be limited without vital signs or the ability to perform a full hands-on physical exam  Cruz Lawson understands she or the provider may request at any time to terminate the video visit and request the patient to seek care or treatment in person

## 2022-01-25 ENCOUNTER — TELEPHONE (OUTPATIENT)
Dept: NEUROLOGY | Facility: CLINIC | Age: 32
End: 2022-01-25

## 2022-01-25 ENCOUNTER — PROCEDURE VISIT (OUTPATIENT)
Dept: NEUROLOGY | Facility: CLINIC | Age: 32
End: 2022-01-25
Payer: COMMERCIAL

## 2022-01-25 VITALS — TEMPERATURE: 97.9 F | DIASTOLIC BLOOD PRESSURE: 85 MMHG | SYSTOLIC BLOOD PRESSURE: 145 MMHG

## 2022-01-25 DIAGNOSIS — G43.709 CHRONIC MIGRAINE WITHOUT AURA WITHOUT STATUS MIGRAINOSUS, NOT INTRACTABLE: Primary | ICD-10-CM

## 2022-01-25 PROCEDURE — 64615 CHEMODENERV MUSC MIGRAINE: CPT | Performed by: PHYSICIAN ASSISTANT

## 2022-01-25 NOTE — PROGRESS NOTES
Universal Protocol   Consent: Verbal consent obtained  Written consent obtained    Risks and benefits: risks, benefits and alternatives were discussed  Consent given by: patient  Patient understanding: patient states understanding of the procedure being performed  Patient consent: the patient's understanding of the procedure matches consent given  Procedure consent: procedure consent matches procedure scheduled        Chemodenervation     Date/Time 1/25/2022 8:35 AM     Performed by  Vivek Jansen PA-C     Authorized by Vivek Jansen PA-C        Pre-procedure details      Prepped With: Alcohol     Procedure details     Position:  Upright   Botox     Botox Type:  Type A    Brand:  Botox    mL's of Botulinum Toxin:  185    Final Concentration per CC:  100 units    Needle Gauge:  30 G 2 5 inch   Procedures     Botox Procedures: chronic headache      Indications: migraines     Injection Location      Head / Face:  L superior trapezius, R superior trapezius, L superior cervical paraspinal, R superior cervical paraspinal, L , R , procerus, L temporalis, R temporalis, R frontalis, L frontalis, R medial occipitalis and L medial occipitalis    L  injection amount:  5 unit(s)    R  injection amount:  5 unit(s)    L lateral frontalis:  5 unit(s)    R lateral frontalis:  5 unit(s)    L medial frontalis:  5 unit(s)    R medial frontalis:  5 unit(s)    L temporalis injection amount:  20 unit(s)    R temporalis injection amount:  20 unit(s)    Procerus injection amount:  5 unit(s)    L medial occipitalis injection amount:  15 unit(s)    R medial occipitalis injection amount:  15 unit(s)    L superior cervical paraspinal injection amount:  10 unit(s)    R superior cervical paraspinal injection amount:  10 unit(s)    L superior trapezius injection amount:  0 unit(s)    R superior trapezius injection amount:  0 unit(s)   Total Units     Total units used:  185    Total units discarded:  15 Post-procedure details      Chemodenervation:  Chronic migraine    Facial Nerve Location[de-identified]  Bilateral facial nerve    Patient tolerance of procedure: Tolerated well, no immediate complications   Comments      Extra units medically necessary:  - 30 units between the R and L temporalis muscles/ hair band region  - 20 units scalp t/o  - 10 units in the occipitalis muscles b/l  Avoided traps b/l  Blood pressure 145/85, temperature 97 9 °F (36 6 °C), temperature source Oral, not currently breastfeeding

## 2022-01-25 NOTE — TELEPHONE ENCOUNTER
appt scheduled 4/26/2022     ----- Message from Sarah Barboza PA-C sent at 1/25/2022  8:55 AM EST -----  Regarding: botox f/u  Would you mind contacting the pt later today to schedule 3 month botox? Thanks

## 2022-01-28 ENCOUNTER — TELEMEDICINE (OUTPATIENT)
Dept: FAMILY MEDICINE CLINIC | Facility: CLINIC | Age: 32
End: 2022-01-28
Payer: COMMERCIAL

## 2022-01-28 DIAGNOSIS — J01.00 ACUTE NON-RECURRENT MAXILLARY SINUSITIS: Primary | ICD-10-CM

## 2022-01-28 PROCEDURE — 99213 OFFICE O/P EST LOW 20 MIN: CPT | Performed by: FAMILY MEDICINE

## 2022-01-28 RX ORDER — AMOXICILLIN 500 MG/1
500 CAPSULE ORAL EVERY 8 HOURS SCHEDULED
Qty: 30 CAPSULE | Refills: 0 | Status: SHIPPED | OUTPATIENT
Start: 2022-01-28 | End: 2022-02-07

## 2022-01-28 NOTE — PROGRESS NOTES
Virtual Regular Visit    Verification of patient location:    Patient is located in the following state in which I hold an active license [place]      Assessment/Plan:    Problem List Items Addressed This Visit        Respiratory    Acute non-recurrent maxillary sinusitis - Primary    Relevant Medications    amoxicillin (AMOXIL) 500 mg capsule               Reason for visit is   Chief Complaint   Patient presents with    Sinus Problem     Sinus congestion and drainage, cough due to drainage    Virtual Regular Visit        Encounter provider Raquel Salazar DO    Provider located at [address] [place] [id number]      Recent Visits  No visits were found meeting these conditions  Showing recent visits within past 7 days and meeting all other requirements  Today's Visits  Date Type Provider Dept   01/28/22 Telemedicine Dariana Saldivar DO [address] [facility] today's visits and meeting all other requirements  Future Appointments  No visits were found meeting these conditions  Showing future appointments within next 150 days and meeting all other requirements       The patient was identified by name and date of birth  Catrina Henriquez was informed that this is a telemedicine visit and that the visit is being conducted through [facility] and patient was informed this is a secure, HIPAA-complaint platform  She agrees to proceed     My office door was closed  No one else was in the room  She acknowledged consent and understanding of privacy and security of the video platform  The patient has agreed to participate and understands they can discontinue the visit at any time  Patient is aware this is a billable service  Gorge Ellis is a 28 y o  female as below   Patient with sinus pain and pressure along with swelling over the nasal bridge over the past 3 days    Patient with nasal congestion as well as rhinorrhea postnasal drip  No loss of smell or taste  No body aches or myalgias or fatigue  No fevers or chills  No nausea vomiting diarrhea  No headache  Patient using DayQuil and NyQuil  Past Medical History:   Diagnosis Date    Abnormal Pap smear of cervix     Anxiety     Arthritis     Depression     Fibromyalgia, primary     IBS (irritable bowel syndrome)     Migraine     Obesity     Vitamin B12 deficiency        Past Surgical History:   Procedure Laterality Date    COLONOSCOPY N/A 5/8/2018    Procedure: COLONOSCOPY;  Surgeon: Sanchez Simons MD;  Location: [facility] GI LAB; Service: Gastroenterology    MT EXC SKIN BENIG >4 CM REMAINDR BODY N/A 7/1/2021    Procedure: EXCISION OF PILAR SCALP CYST X 2 AND RIGHT INNER GROIN NEVVUS;  Surgeon: Carlos Munson MD;  Location: [address] [place] [address] MAIN OR;  Service: Plastics    MT RECMPL WND SCALP,EXTR 2 6-7 5 CM N/A 7/1/2021    Procedure: CLOSURE WOUND SCALP X 2 AND RIGHT INNER GROIN;  Surgeon: Carlos Munson MD;  Location:  MAIN OR;  Service: Plastics    TONSILLECTOMY      WISDOM TOOTH EXTRACTION         Current Outpatient Medications   Medication Sig Dispense Refill    acetaminophen (TYLENOL) 500 mg tablet Take 2 tablets (1,000 mg total) by mouth every 6 (six) hours as needed for mild pain or moderate pain (Patient not taking: Reported on 12/20/2021 ) 100 tablet 0    Aimovig 140 MG/ML SOAJ Inject 140mg under the skin every 30 (thirty) days   1 pen 10    ALPRAZolam (XANAX) 0 5 mg tablet Take 1 tablet (0 5 mg total) by mouth daily as needed for anxiety 30 tablet 2    amoxicillin (AMOXIL) 500 mg capsule Take 1 capsule (500 mg total) by mouth every 8 (eight) hours for 10 days 30 capsule 0    ARIPiprazole (ABILIFY) 2 mg tablet Take 1 tablet (2 mg total) by mouth daily 30 tablet 2    B-D 3CC LUER-DEANN SYR 25GX1" 25G X 1" 3 ML MISC       buPROPion (WELLBUTRIN XL) 150 mg 24 hr tablet Take 1 tablet (150 mg total) by mouth daily 30 tablet 2    Enbrel SureClick 50 MG/ML injection Inject 50 mg under the skin once a week        escitalopram (LEXAPRO) 20 mg tablet Take 1 tablet (20 mg total) by mouth daily 30 tablet 2    etonogestrel-ethinyl estradiol (NuvaRing) 0 12-0 015 MG/24HR vaginal ring Insert ring for 21 days then remove for one week  3 each 3    ketorolac (TORADOL) 10 mg tablet Take 1 tablet (10 mg total) by mouth every 6 (six) hours as needed (migraine) Max 2-3 per week   10 tablet 0    melatonin 1 mg 1-2 tabs qhs prn insomnia 60 tablet 2    metFORMIN (GLUCOPHAGE-XR) 500 mg 24 hr tablet Take 2 tablets (1,000 mg total) by mouth daily with breakfast 30 tablet 2    olmesartan (BENICAR) 20 mg tablet Take 1 tablet (20 mg total) by mouth daily 30 tablet 2    onabotulinumtoxin A (BOTOX) 100 units Inject as directed      pantoprazole (PROTONIX) 40 mg tablet Take 1 tablet (40 mg total) by mouth daily before breakfast 30 tablet 5    promethazine (PHENERGAN) 25 mg tablet Take 1 tablet (25 mg total) by mouth every 6 (six) hours as needed for nausea or vomiting 30 tablet 0    Syringe/Needle, Disp, (SYRINGE 3CC/60CK4-5/4") 27G X 1-1/4" 3 ML MISC Use for IM injection of ketorolac  4 each 0    valACYclovir (VALTREX) 500 mg tablet Take 1 tablet (500 mg total) by mouth daily (Patient not taking: Reported on 12/20/2021 ) 90 tablet 2    vitamin B-12 (VITAMIN B-12) 1,000 mcg tablet Take 1,000 mcg by mouth daily        Current Facility-Administered Medications   Medication Dose Route Frequency Provider Last Rate Last Admin    cyanocobalamin injection 1,000 mcg  1,000 mcg Intramuscular Q30 Days Concepcion Hoots, DO   1,000 mcg at 08/03/20 0859    cyanocobalamin injection 1,000 mcg  1,000 mcg Intramuscular Q14 Days Concepcion Yuots, DO   1,000 mcg at 05/18/20 1146    cyanocobalamin injection 1,000 mcg  1,000 mcg Intramuscular Q30 Days Concepcion Yuots, DO   1,000 mcg at 09/01/20 1129        Allergies   Allergen Reactions    Desvenlafaxine      Other reaction(s): state of confusion    Valproic Acid Other (See Comments)     Other reaction(s): dilated pupils, "schizi"    Tizanidine Anxiety       Review of Systems   Constitutional: Negative  HENT: Positive for congestion, postnasal drip, rhinorrhea, sinus pressure and sinus pain  Eyes: Negative  Respiratory: Negative  Cardiovascular: Negative  Gastrointestinal: Negative  Endocrine: Negative  Genitourinary: Negative  Musculoskeletal: Negative  Skin: Negative  Allergic/Immunologic: Negative  Neurological: Negative  Hematological: Negative  Psychiatric/Behavioral: Negative  Video Exam    There were no vitals filed for this visit  Physical Exam  Nursing note reviewed  Constitutional:       General: She is not in acute distress  Appearance: Normal appearance  She is not ill-appearing, toxic-appearing or diaphoretic  HENT:      Head: Normocephalic and atraumatic  Right Ear: External ear normal       Left Ear: External ear normal       Nose: Nose normal    Pulmonary:      Effort: Pulmonary effort is normal    Neurological:      Mental Status: She is alert  Psychiatric:         Mood and Affect: Mood normal          Behavior: Behavior normal          Thought Content: Thought content normal           I spent 23 minutes directly with the patient during this visit    [address] verbally agrees to participate in [employer]  Pt is aware that [employer] could be limited without vital signs or the ability to perform a full hands-on physical exam  Cruz Lawson understands she or the provider may request at any time to terminate the video visit and request the patient to seek care or treatment in person

## 2022-02-10 ENCOUNTER — OFFICE VISIT (OUTPATIENT)
Dept: URGENT CARE | Facility: CLINIC | Age: 32
End: 2022-02-10
Payer: COMMERCIAL

## 2022-02-10 VITALS
WEIGHT: 249 LBS | TEMPERATURE: 98.3 F | DIASTOLIC BLOOD PRESSURE: 88 MMHG | HEART RATE: 98 BPM | SYSTOLIC BLOOD PRESSURE: 146 MMHG | BODY MASS INDEX: 44.12 KG/M2 | HEIGHT: 63 IN | OXYGEN SATURATION: 97 % | RESPIRATION RATE: 18 BRPM

## 2022-02-10 DIAGNOSIS — H57.89 EYE SWELLING, BILATERAL: ICD-10-CM

## 2022-02-10 DIAGNOSIS — J02.9 SORE THROAT: Primary | ICD-10-CM

## 2022-02-10 PROCEDURE — 99213 OFFICE O/P EST LOW 20 MIN: CPT

## 2022-02-10 NOTE — PROGRESS NOTES
[facility]        NAME: Sage Waters is a 28 y o  female  : 1990    MRN: [id number]  DATE: February 10, 2022  TIME: 11:13 AM    Assessment and Plan   Sore throat [J02 9]  1  Sore throat     2  Eye swelling, bilateral       Possible side effects from new Cimzia medication vs viral infection    Her symptoms started 1 day after her first bolus injections  Do not appreciate any concerning signs or symptoms that warrant further imaging or testing today  Continue to monitor for new or worsening symptoms as discussed  F/u with rheumatologist      Patient Instructions     Patient Instructions   Continue taking benadryl as needed  Can use cool compresses for the eyes  Monitor for any new or worsening symptoms such as fevers, chills, spreading lymph node swelling, and eye pain or swelling  Follow-up with rheumatologist         Follow up with PCP in 3-5 days  Proceed to  ER if symptoms worsen  Chief Complaint     Chief Complaint   Patient presents with    Swollen Glands     Patient reports starting new medication for RA on 22  Patient feels her lymph nodes are eyes are swollen since 22  History of Present Illness       HPI   Cruz Boyce is a 28 y o  female presents today with complaints of a left swollen neck lymph node starting today, and bilateral eye swelling which started 2 days ago  She reports starting a new RA injection medication called Cimzia which she took the day before her symptoms started  She took benadryl the past two days with some improvement, no benadryl today  She denies any sick contacts  Denies eye trauma  She denies shortness of breath, lip or tongue swelling, eye pain or redness, visual changes, chest pain, or injection site reaction  She called her rheumatology office today who feel that this is not a reaction to medication and advised her to come get evaluated at care now    The patient also called the Cimzia medication representatives who feel like this is not a medication side effect, but they are going reach out to her for follow-up information  Review of Systems   Review of Systems   Constitutional: Positive for fatigue  Negative for chills and fever  HENT: Positive for postnasal drip and sore throat  Negative for congestion  Eyes: Positive for discharge (watery discharge, resolved)  Negative for photophobia, pain, redness, itching and visual disturbance  Respiratory: Negative for cough and shortness of breath  Cardiovascular: Negative for chest pain and palpitations  Gastrointestinal: Negative for abdominal pain, diarrhea, nausea and vomiting  Genitourinary: Negative for dysuria and frequency  Musculoskeletal: Negative for arthralgias and myalgias  Skin: Negative for color change and rash  Neurological: Negative for dizziness, syncope, weakness and headaches  Current Medications       Current Outpatient Medications:     Aimovig 140 MG/ML SOAJ, Inject 140mg under the skin every 30 (thirty) days  , Disp: 1 pen, Rfl: 10    ALPRAZolam (XANAX) 0 5 mg tablet, Take 1 tablet (0 5 mg total) by mouth daily as needed for anxiety, Disp: 30 tablet, Rfl: 2    ARIPiprazole (ABILIFY) 2 mg tablet, Take 1 tablet (2 mg total) by mouth daily, Disp: 30 tablet, Rfl: 2    B-D 3CC LUER-DEANN SYR 25GX1" 25G X 1" 3 ML MISC, , Disp: , Rfl:     buPROPion (WELLBUTRIN XL) 150 mg 24 hr tablet, Take 1 tablet (150 mg total) by mouth daily, Disp: 30 tablet, Rfl: 2    Certolizumab Pegol (Cimzia Starter Kit) 6 X 200 MG/ML KIT, Inject 1 application under the skin every 30 (thirty) days, Disp: , Rfl:     escitalopram (LEXAPRO) 20 mg tablet, Take 1 tablet (20 mg total) by mouth daily, Disp: 30 tablet, Rfl: 2    etonogestrel-ethinyl estradiol (NuvaRing) 0 12-0 015 MG/24HR vaginal ring, Insert ring for 21 days then remove for one week , Disp: 3 each, Rfl: 3    ketorolac (TORADOL) 10 mg tablet, Take 1 tablet (10 mg total) by mouth every 6 (six) hours as needed (migraine) Max 2-3 per week , Disp: 10 tablet, Rfl: 0    melatonin 1 mg, 1-2 tabs qhs prn insomnia, Disp: 60 tablet, Rfl: 2    metFORMIN (GLUCOPHAGE-XR) 500 mg 24 hr tablet, Take 2 tablets (1,000 mg total) by mouth daily with breakfast, Disp: 30 tablet, Rfl: 2    olmesartan (BENICAR) 20 mg tablet, Take 1 tablet (20 mg total) by mouth daily, Disp: 30 tablet, Rfl: 2    onabotulinumtoxin A (BOTOX) 100 units, Inject as directed, Disp: , Rfl:     Syringe/Needle, Disp, (SYRINGE 3CC/01AE3-3/4") 27G X 1-1/4" 3 ML MISC, Use for IM injection of ketorolac , Disp: 4 each, Rfl: 0    vitamin B-12 (VITAMIN B-12) 1,000 mcg tablet, Take 1,000 mcg by mouth daily , Disp: , Rfl:     acetaminophen (TYLENOL) 500 mg tablet, Take 2 tablets (1,000 mg total) by mouth every 6 (six) hours as needed for mild pain or moderate pain (Patient not taking: Reported on 12/20/2021 ), Disp: 100 tablet, Rfl: 0    Enbrel SureClick 50 MG/ML injection, Inject 50 mg under the skin once a week   (Patient not taking: Reported on 2/10/2022 ), Disp: , Rfl:     pantoprazole (PROTONIX) 40 mg tablet, Take 1 tablet (40 mg total) by mouth daily before breakfast (Patient not taking: Reported on 2/10/2022 ), Disp: 30 tablet, Rfl: 5    promethazine (PHENERGAN) 25 mg tablet, Take 1 tablet (25 mg total) by mouth every 6 (six) hours as needed for nausea or vomiting (Patient not taking: Reported on 2/10/2022 ), Disp: 30 tablet, Rfl: 0    valACYclovir (VALTREX) 500 mg tablet, Take 1 tablet (500 mg total) by mouth daily (Patient not taking: Reported on 12/20/2021 ), Disp: 90 tablet, Rfl: 2    Current Facility-Administered Medications:     cyanocobalamin injection 1,000 mcg, 1,000 mcg, Intramuscular, Q30 Days, Julieth Dorado DO, 1,000 mcg at 08/03/20 0859    cyanocobalamin injection 1,000 mcg, 1,000 mcg, Intramuscular, Q14 Days, Julieth Dorado DO, 1,000 mcg at 05/18/20 1146    cyanocobalamin injection 1,000 mcg, 1,000 mcg, Intramuscular, Q30 Days, Jovanna Lainez DO [place], 1,000 mcg at 09/01/20 1129    Current Allergies     Allergies as of 02/10/2022 - Reviewed 02/10/2022   Allergen Reaction Noted    Desvenlafaxine  11/26/2012    Valproic acid Other (See Comments) 10/18/2017    Tizanidine Anxiety 01/20/2021            The following portions of the patient's history were reviewed and updated as appropriate: allergies, current medications, past family history, past medical history, past social history, past surgical history and problem list      Past Medical History:   Diagnosis Date    Abnormal Pap smear of cervix     Anxiety     Arthritis     Depression     Fibromyalgia, primary     IBS (irritable bowel syndrome)     Migraine     Obesity     Vitamin B12 deficiency        Past Surgical History:   Procedure Laterality Date    COLONOSCOPY N/A 5/8/2018    Procedure: COLONOSCOPY;  Surgeon: Suraj Woods MD;  Location: [facility]; Service: Gastroenterology    CA EXC SKIN BENIG >4 CM REMAINDR BODY N/A 7/1/2021    Procedure: EXCISION OF PILAR SCALP CYST X 2 AND RIGHT INNER GROIN NEVVUS;  Surgeon: Diego Pedersen MD;  Location:  MAIN OR;  Service: Plastics    CA RECMPL WND SCALP,EXTR 2 6-7 5 CM N/A 7/1/2021    Procedure: CLOSURE WOUND SCALP X 2 AND RIGHT INNER GROIN;  Surgeon: Diego Pedersen MD;  Location: [address] [place] [address] MAIN OR;  Service: Plastics    TONSILLECTOMY      WISDOM TOOTH EXTRACTION         Family History   Problem Relation Age of Onset    Rheum arthritis Mother     Psoriasis Mother     Other Mother     Hypertension Mother     Diabetes unspecified Mother     Sjogren's syndrome Mother     Alcohol abuse Mother     Drug abuse Mother     Anxiety disorder Father     Alcohol abuse Father     Lung cancer Maternal Grandfather     Cancer Other         bone    Diabetes Other     Other Other         High blood pressure    Depression Maternal Grandmother          Medications have been verified          Objective   /88   Pulse 98   Temp 98 3 °F (36 8 °C) (Temporal)   Resp 18   Ht 5' 3" (1 6 m)   Wt 113 kg (249 lb)   SpO2 97%   BMI 44 11 kg/m²        Physical Exam     Physical Exam  Vitals and nursing note reviewed  Constitutional:       General: She is not in acute distress  Appearance: Normal appearance  HENT:      Head: Normocephalic and atraumatic  Right Ear: Tympanic membrane and ear canal normal       Left Ear: Tympanic membrane and ear canal normal       Mouth/Throat:      Mouth: Mucous membranes are moist       Pharynx: No posterior oropharyngeal erythema  Eyes:      General: Lids are normal  Vision grossly intact  Right eye: No discharge  Left eye: No discharge  Extraocular Movements: Extraocular movements intact  Right eye: Normal extraocular motion  Left eye: Normal extraocular motion  Conjunctiva/sclera: Conjunctivae normal       Right eye: Right conjunctiva is not injected  Left eye: Left conjunctiva is not injected  Pupils: Pupils are equal, round, and reactive to light  Comments: No eye swelling noted  No pain with eye movement  No TTP  Cardiovascular:      Rate and Rhythm: Normal rate and regular rhythm  Heart sounds: Normal heart sounds  Pulmonary:      Effort: Pulmonary effort is normal       Breath sounds: Normal breath sounds  No wheezing, rhonchi or rales  Musculoskeletal:      Cervical back: Normal range of motion and neck supple  Lymphadenopathy:      Cervical: Cervical adenopathy present  Right cervical: No superficial cervical adenopathy  Left cervical: Superficial cervical adenopathy (tender mobile lymph node palpated) present  Skin:     General: Skin is warm and dry  Psychiatric:         Behavior: Behavior normal  Behavior is cooperative

## 2022-02-10 NOTE — PATIENT INSTRUCTIONS
Continue taking benadryl as needed  Can use cool compresses for the eyes  Monitor for any new or worsening symptoms such as fevers, chills, spreading lymph node swelling, and eye pain or swelling    Follow-up with rheumatologist

## 2022-02-11 ENCOUNTER — TELEMEDICINE (OUTPATIENT)
Dept: BEHAVIORAL/MENTAL HEALTH CLINIC | Facility: CLINIC | Age: 32
End: 2022-02-11
Payer: COMMERCIAL

## 2022-02-11 DIAGNOSIS — F33.1 DEPRESSION, MAJOR, RECURRENT, MODERATE (HCC): Primary | Chronic | ICD-10-CM

## 2022-02-11 DIAGNOSIS — F41.1 GENERALIZED ANXIETY DISORDER: Chronic | ICD-10-CM

## 2022-02-11 PROCEDURE — 90834 PSYTX W PT 45 MINUTES: CPT | Performed by: SOCIAL WORKER

## 2022-02-11 NOTE — PSYCH
Virtual Regular Visit    Verification of patient location:    Patient is located in the following state in which I hold an active license [place]    Assessment/Plan:    Problem List Items Addressed This Visit        Other    Generalized anxiety disorder (Chronic)    Depression, major, recurrent, moderate (HCC) - Primary (Chronic)        Goals addressed in session: Goal 1      Reason for visit is No chief complaint on file  Encounter provider APARNA Ham    Provider located at [address] [place] [id number] [phone]    Recent Visits  No visits were found meeting these conditions  Showing recent visits within past 7 days and meeting all other requirements  Future Appointments  No visits were found meeting these conditions  Showing future appointments within next 150 days and meeting all other requirements     The patient was identified by name and date of birth  Callie Corley was informed that this is a telemedicine visit and that the visit is being conducted throughEpic Embedded and patient was informed this is a secure, HIPAA-complaint platform  She agrees to proceed     My office door was closed  No one else was in the room  She acknowledged consent and understanding of privacy and security of the video platform  The patient has agreed to participate and understands they can discontinue the visit at any time  Patient is aware this is a billable service  Erlinda Garza is a 28 y o  female  DATA: Met with Stephanie for scheduled individual session  Topics of discussion included family stressors, relationships with family, physical health concerns and relationship with SO and other social supports  "I started a new medication " Jenn Notice states that she had a potential side effect (swollen eyes) after her first dosage of the medication  She states that this lasted for four days  She is in contact with the  to report the potential side effect  Stephanie discussed her relationship with her SO  She discussed her significant physical discomfort during her visit with him  She states that she had an IBS flare and significant muscle/body pain  She discussed their relationship dynamics and his response to her when she felt too ill to be physically intimate  He told her that it hurts her feelings, and she is having difficulty with understanding this and doesn't know how to respond to him  Michelle Villasenor discussed her attempts to engage in intuitive eating  She states that she has "no appetite whatsoever " She states that she does not even want to eat foods that she enjoys  We discussed setting scheduled times to eat nutritious foods-- and also discussed being non-judgmental with herself regarding her physical sensations  Michelle Villasenor discussed her social connections and increasing her social supports (e g , friend who is starting on same medication as Stephanie)  Michelle Villasenor gave an update about her family situation  Her mother remains with her grandmother  Michelle Villasenor speaks with her mother on a daily basis  She is still not speaking with her uncle  Michelle Villasenor states that her grandmother's health remains stable, but she still needs quite a lot of assistance on a daily basis  Her mother and grandmother have stopped the home-care nurses, due to wanting to prevent potential exposure to covid  Michelle Villasenor states that she would like to help, but she has not been feeling well; so she does not want to risk taking any germs to her grandmother  Michelle Villasenor states that she has not been using meditation skills as much as she previously did  She states that there are times that meditation (especially focus on the breath) increases her self-judgment and anxiety  Client shows evidence of utilizing Mindfulness-based strategies, emotion regulation skills and distress tolerance skills skills to manage mental health symptoms   During this session, this clinician used the following therapeutic modalities: supportive psychotherapy, client-centered therapy, mindfulness-based strategies, DBT-informed skills, Motivational Interviewing and solution-focused therapy  ASSESSMENT: Vivian Ashford presents with a normal mood  Her affect is normal range and intensity, appropriate  Vivian Ashford exhibits good therapeutic rapport with this clinician  Vivian Ashford continues to exhibit willingness to work on treatment goals and objectives  Vivian Ashford presents with a minimal risk of suicide, minimal risk of self-harm, and minimal risk of harm to others  PLAN: Vivian Ashford will return in two weeks for the next scheduled session  Between sessions, Vivian Ashford will continue to work on increasing her use of mindfulness-based strategies (including intuitive eating and mindful meditation) and will report back during the next session re: successes and barriers  At the next session, this clinician will use supportive psychotherapy, client-centered therapy, mindfulness-based strategies, DBT-informed skills, Motivational Interviewing and solution-focused therapy to address her mood regulation, eating habits, and relationship concerns, in an effort to assist Vivian Ashford with meeting treatment goals  HPI     Past Medical History:   Diagnosis Date    Abnormal Pap smear of cervix     Anxiety     Arthritis     Depression     Fibromyalgia, primary     IBS (irritable bowel syndrome)     Migraine     Obesity     Vitamin B12 deficiency        Past Surgical History:   Procedure Laterality Date    COLONOSCOPY N/A 5/8/2018    Procedure: COLONOSCOPY;  Surgeon: Ramesh Díaz MD;  Location: [facility];   Service: Gastroenterology    ME EXC SKIN BENIG >4 CM REMAINDR BODY N/A 7/1/2021    Procedure: EXCISION OF PILAR SCALP CYST X 2 AND RIGHT INNER GROIN NEVVUS;  Surgeon: Duy Stinson MD;  Location: [address] [place] [address];  Service: Plastics    ME RECMPL WND SCALP,EXTR 2 6-7 5 CM N/A 7/1/2021    Procedure: CLOSURE WOUND SCALP X 2 AND RIGHT INNER GROIN;  Surgeon: Duy Stinson MD;  Location: [address] [place] [address];  Service: Plastics    TONSILLECTOMY      WISDOM TOOTH EXTRACTION         Current Outpatient Medications   Medication Sig Dispense Refill    acetaminophen (TYLENOL) 500 mg tablet Take 2 tablets (1,000 mg total) by mouth every 6 (six) hours as needed for mild pain or moderate pain (Patient not taking: Reported on 12/20/2021 ) 100 tablet 0    Aimovig 140 MG/ML SOAJ Inject 140mg under the skin every 30 (thirty) days  1 pen 10    ALPRAZolam (XANAX) 0 5 mg tablet Take 1 tablet (0 5 mg total) by mouth daily as needed for anxiety 30 tablet 2    ARIPiprazole (ABILIFY) 2 mg tablet Take 1 tablet (2 mg total) by mouth daily 30 tablet 2    B-D 3CC LUER-DEANN SYR 25GX1" 25G X 1" 3 ML MISC       buPROPion (WELLBUTRIN XL) 150 mg 24 hr tablet Take 1 tablet (150 mg total) by mouth daily 30 tablet 2    Certolizumab Pegol (Cimzia Starter Kit) 6 X 200 MG/ML KIT Inject 1 application under the skin every 30 (thirty) days      Enbrel SureClick 50 MG/ML injection Inject 50 mg under the skin once a week   (Patient not taking: Reported on 2/10/2022 )      escitalopram (LEXAPRO) 20 mg tablet Take 1 tablet (20 mg total) by mouth daily 30 tablet 2    etonogestrel-ethinyl estradiol (NuvaRing) 0 12-0 015 MG/24HR vaginal ring Insert ring for 21 days then remove for one week  3 each 3    ketorolac (TORADOL) 10 mg tablet Take 1 tablet (10 mg total) by mouth every 6 (six) hours as needed (migraine) Max 2-3 per week   10 tablet 0    melatonin 1 mg 1-2 tabs qhs prn insomnia 60 tablet 2    metFORMIN (GLUCOPHAGE-XR) 500 mg 24 hr tablet Take 2 tablets (1,000 mg total) by mouth daily with breakfast 30 tablet 2    olmesartan (BENICAR) 20 mg tablet Take 1 tablet (20 mg total) by mouth daily 30 tablet 2    onabotulinumtoxin A (BOTOX) 100 units Inject as directed      pantoprazole (PROTONIX) 40 mg tablet Take 1 tablet (40 mg total) by mouth daily before breakfast (Patient not taking: Reported on 2/10/2022 ) 30 tablet 5    promethazine (PHENERGAN) 25 mg tablet Take 1 tablet (25 mg total) by mouth every 6 (six) hours as needed for nausea or vomiting (Patient not taking: Reported on 2/10/2022 ) 30 tablet 0    Syringe/Needle, Disp, (SYRINGE 3CC/23TV8-4/4") 27G X 1-1/4" 3 ML MISC Use for IM injection of ketorolac  4 each 0    valACYclovir (VALTREX) 500 mg tablet Take 1 tablet (500 mg total) by mouth daily (Patient not taking: Reported on 12/20/2021 ) 90 tablet 2    vitamin B-12 (VITAMIN B-12) 1,000 mcg tablet Take 1,000 mcg by mouth daily        Current Facility-Administered Medications   Medication Dose Route Frequency Provider Last Rate Last Admin    cyanocobalamin injection 1,000 mcg  1,000 mcg Intramuscular Q30 Days Leita [place], DO   1,000 mcg at 08/03/20 6098    cyanocobalamin injection 1,000 mcg  1,000 mcg Intramuscular Q14 Days Leita [place], DO   1,000 mcg at 05/18/20 1146    cyanocobalamin injection 1,000 mcg  1,000 mcg Intramuscular Q30 Days Leita [place], DO   1,000 mcg at 09/01/20 1129        Allergies   Allergen Reactions    Desvenlafaxine      Other reaction(s): state of confusion    Valproic Acid Other (See Comments)     Other reaction(s): dilated pupils, "schizi"    Tizanidine Anxiety       Review of Systems    Video Exam    There were no vitals filed for this visit  Physical Exam     I spent 45 minutes directly with the patient during this visit    [address] verbally agrees to participate in [facility]  Pt is aware that [facility] could be limited without vital signs or the ability to perform a full hands-on physical exam  Cruz Mcclendon understands she or the provider may request at any time to terminate the video visit and request the patient to seek care or treatment in person

## 2022-02-23 ENCOUNTER — HOSPITAL ENCOUNTER (EMERGENCY)
Facility: HOSPITAL | Age: 32
Discharge: HOME | End: 2022-02-23
Attending: EMERGENCY MEDICINE
Payer: COMMERCIAL

## 2022-02-23 VITALS
WEIGHT: 240 LBS | HEART RATE: 96 BPM | BODY MASS INDEX: 42.52 KG/M2 | DIASTOLIC BLOOD PRESSURE: 102 MMHG | SYSTOLIC BLOOD PRESSURE: 169 MMHG | HEIGHT: 63 IN | RESPIRATION RATE: 17 BRPM | TEMPERATURE: 96.8 F | OXYGEN SATURATION: 97 %

## 2022-02-23 DIAGNOSIS — T78.40XA ALLERGIC REACTION, INITIAL ENCOUNTER: Primary | ICD-10-CM

## 2022-02-23 DIAGNOSIS — E16.1 HYPERINSULINEMIA: ICD-10-CM

## 2022-02-23 DIAGNOSIS — E66.01 MORBID OBESITY WITH BMI OF 40.0-44.9, ADULT (HCC): ICD-10-CM

## 2022-02-23 PROCEDURE — 99283 EMERGENCY DEPT VISIT LOW MDM: CPT

## 2022-02-23 PROCEDURE — 99284 EMERGENCY DEPT VISIT MOD MDM: CPT | Performed by: EMERGENCY MEDICINE

## 2022-02-23 RX ORDER — METFORMIN HYDROCHLORIDE 500 MG/1
1000 TABLET, EXTENDED RELEASE ORAL
Qty: 30 TABLET | Refills: 2 | Status: SHIPPED | OUTPATIENT
Start: 2022-02-23 | End: 2022-05-10 | Stop reason: SDUPTHER

## 2022-02-23 RX ORDER — METHYLPREDNISOLONE 4 MG/1
TABLET ORAL
Qty: 21 TABLET | Refills: 0 | Status: SHIPPED | OUTPATIENT
Start: 2022-02-23 | End: 2022-04-04

## 2022-02-23 NOTE — DISCHARGE INSTRUCTIONS
On follow-up with your doctors this week about any medication changes that may need to occur for your rheumatologic needs  Continue taking Benadryl as indicated on the package labeling for another 1-2 days  Do not drive or operate heavy machinery on this medication  Use a Medrol Dosepak as indicated with food  Return to the ER with any new, concerning, worsening issues

## 2022-02-23 NOTE — ED PROVIDER NOTES
History  Chief Complaint   Patient presents with    Allergic Reaction     Patient reports starting simzia 2 weeks, facial/throat swelling, tongue swelling, throat soreness  36-year-old female presents emergency room noting that she feels that she is getting allergic reaction from a biologic she is starting for rheumatologic disorders  The patient notes that she had her last dose of the medication on Monday and feels that the back of her tongue is becoming sore, her upper lip is been swollen in her hands feels slightly swollen as well  She had a lesser reaction on her dose before this, but she was told to take Benadryl and continue  Today she was concerned that if her reaction gets worse that can be life-threatening so she came to the hospital   Her doctors have told her to stop her biologic  Patient denies any itchy rash or difficulty breathing at this time  Prior to Admission Medications   Prescriptions Last Dose Informant Patient Reported? Taking? ALPRAZolam (XANAX) 0 5 mg tablet Past Week at Unknown time  No Yes   Sig: Take 1 tablet (0 5 mg total) by mouth daily as needed for anxiety   ARIPiprazole (ABILIFY) 2 mg tablet 2/22/2022 at Unknown time  No Yes   Sig: Take 1 tablet (2 mg total) by mouth daily   Aimovig 140 MG/ML SOAJ  Self No No   Sig: Inject 140mg under the skin every 30 (thirty) days     B-D 3CC LUER-DEANN SYR 25GX1" 25G X 1" 3 ML MISC  Self Yes No   Certolizumab Pegol (Cimzia Starter Kit) 6 X 200 MG/ML KIT   Yes No   Sig: Inject 1 application under the skin every 30 (thirty) days   Enbrel SureClick 50 MG/ML injection   Yes No   Sig: Inject 50 mg under the skin once a week     Patient not taking: Reported on 2/10/2022    Syringe/Needle, Disp, (SYRINGE 3CC/29KB1-2/4") 27G X 1-1/4" 3 ML MISC  Self No No   Sig: Use for IM injection of ketorolac    acetaminophen (TYLENOL) 500 mg tablet  Self No No   Sig: Take 2 tablets (1,000 mg total) by mouth every 6 (six) hours as needed for mild pain or moderate pain   Patient not taking: Reported on 2021    buPROPion (WELLBUTRIN XL) 150 mg 24 hr tablet   No No   Sig: Take 1 tablet (150 mg total) by mouth daily   escitalopram (LEXAPRO) 20 mg tablet 2022 at Unknown time  No Yes   Sig: Take 1 tablet (20 mg total) by mouth daily   etonogestrel-ethinyl estradiol (NuvaRing) 0 12-0 015 MG/24HR vaginal ring   No No   Sig: Insert ring for 21 days then remove for one week    ketorolac (TORADOL) 10 mg tablet Past Month at Unknown time  No Yes   Sig: Take 1 tablet (10 mg total) by mouth every 6 (six) hours as needed (migraine) Max 2-3 per week     melatonin 1 mg  Self No No   Si-2 tabs qhs prn insomnia   olmesartan (BENICAR) 20 mg tablet 2022 at Unknown time  No Yes   Sig: Take 1 tablet (20 mg total) by mouth daily   onabotulinumtoxin A (BOTOX) 100 units  Self Yes No   Sig: Inject as directed   pantoprazole (PROTONIX) 40 mg tablet   No No   Sig: Take 1 tablet (40 mg total) by mouth daily before breakfast   Patient not taking: Reported on 2/10/2022    promethazine (PHENERGAN) 25 mg tablet   No No   Sig: Take 1 tablet (25 mg total) by mouth every 6 (six) hours as needed for nausea or vomiting   Patient not taking: Reported on 2/10/2022    valACYclovir (VALTREX) 500 mg tablet   No No   Sig: Take 1 tablet (500 mg total) by mouth daily   Patient not taking: Reported on 2021    vitamin B-12 (VITAMIN B-12) 1,000 mcg tablet  Self Yes No   Sig: Take 1,000 mcg by mouth daily       Facility-Administered Medications Last Administration Doses Remaining   cyanocobalamin injection 1,000 mcg 8/3/2020  8:59 AM    cyanocobalamin injection 1,000 mcg 2020 11:46 AM    cyanocobalamin injection 1,000 mcg 2020 11:29 AM           Past Medical History:   Diagnosis Date    Abnormal Pap smear of cervix     Anxiety     Arthritis     Depression     Fibromyalgia, primary     IBS (irritable bowel syndrome)     Migraine     Obesity     Vitamin B12 deficiency Past Surgical History:   Procedure Laterality Date    COLONOSCOPY N/A 5/8/2018    Procedure: COLONOSCOPY;  Surgeon: Sakshi Francis MD;  Location: [facility] GI LAB; Service: Gastroenterology    ID EXC SKIN BENIG >4 CM REMAINDR BODY N/A 7/1/2021    Procedure: EXCISION OF PILAR SCALP CYST X 2 AND RIGHT INNER GROIN NEVVUS;  Surgeon: Brooklyn Oakes MD;  Location:  MAIN OR;  Service: Plastics    ID RECMPL WND SCALP,EXTR 2 6-7 5 CM N/A 7/1/2021    Procedure: CLOSURE WOUND SCALP X 2 AND RIGHT INNER GROIN;  Surgeon: Brooklyn Oakes MD;  Location:  MAIN OR;  Service: Plastics    TONSILLECTOMY      WISDOM TOOTH EXTRACTION         Family History   Problem Relation Age of Onset    Rheum arthritis Mother     Psoriasis Mother     Other Mother     Hypertension Mother     Diabetes unspecified Mother     Sjogren's syndrome Mother     Alcohol abuse Mother     Drug abuse Mother     Anxiety disorder Father     Alcohol abuse Father     Lung cancer Maternal Grandfather     Cancer Other         bone    Diabetes Other     Other Other         High blood pressure    Depression Maternal Grandmother      I have reviewed and agree with the history as documented  E-Cigarette/Vaping    E-Cigarette Use Former User     Start Date 7/1/19     Comments medical marijuana       E-Cigarette/Vaping Substances    Nicotine No     THC Yes     CBD No     Flavoring No     Other No     Unknown No      Social History     Tobacco Use    Smoking status: Never Smoker    Smokeless tobacco: Never Used   Vaping Use    Vaping Use: Former    Start date: 7/1/2019    Substances: THC   Substance Use Topics    Alcohol use: Yes     Comment: rarely    Drug use: Yes     Frequency: 2 0 times per week     Types: Marijuana     Comment: Medical marijuana not using        Review of Systems   Constitutional: Negative for chills and fever  HENT: Negative for ear pain and sore throat  Eyes: Negative for pain and visual disturbance  Respiratory: Negative for cough and shortness of breath  Cardiovascular: Negative for chest pain and palpitations  Gastrointestinal: Negative for abdominal pain and vomiting  Genitourinary: Negative for dysuria and hematuria  Musculoskeletal: Negative for arthralgias and back pain  Skin: Negative for color change and rash  Allergic/Immunologic:        Complains of hand swelling and upper lip swelling   Neurological: Negative for seizures and syncope  All other systems reviewed and are negative  Physical Exam  Physical Exam  Vitals and nursing note reviewed  Constitutional:       General: She is not in acute distress  Appearance: Normal appearance  She is well-developed  HENT:      Head: Normocephalic and atraumatic  Right Ear: External ear normal       Left Ear: External ear normal       Nose: Nose normal       Mouth/Throat:      Mouth: Mucous membranes are moist       Pharynx: No oropharyngeal exudate or posterior oropharyngeal erythema  Comments: Faint amount of swelling noted to the upper lip  Eyes:      Conjunctiva/sclera: Conjunctivae normal    Neck:      Comments: No stridor  Cardiovascular:      Rate and Rhythm: Normal rate and regular rhythm  Pulses: Normal pulses  Heart sounds: Normal heart sounds  No murmur heard  Pulmonary:      Effort: Pulmonary effort is normal  No respiratory distress  Breath sounds: Normal breath sounds  Abdominal:      General: Bowel sounds are normal       Palpations: Abdomen is soft  Tenderness: There is no abdominal tenderness  Musculoskeletal:         General: No swelling or deformity  Cervical back: Neck supple  Skin:     General: Skin is warm and dry  Capillary Refill: Capillary refill takes less than 2 seconds  Findings: No rash  Neurological:      General: No focal deficit present  Mental Status: She is alert and oriented to person, place, and time  Mental status is at baseline  Psychiatric:         Mood and Affect: Mood normal          Vital Signs  ED Triage Vitals [02/23/22 0658]   Temperature Pulse Respirations Blood Pressure SpO2   (!) 96 8 °F (36 °C) 96 17 (!) 169/102 97 %      Temp Source Heart Rate Source Patient Position - Orthostatic VS BP Location FiO2 (%)   Temporal Monitor Sitting Left arm --      Pain Score       No Pain           Vitals:    02/23/22 0658   BP: (!) 169/102   Pulse: 96   Patient Position - Orthostatic VS: Sitting         Visual Acuity      ED Medications  Medications - No data to display    Diagnostic Studies  Results Reviewed     None                 No orders to display              Procedures  Procedures         ED Course                               SBIRT 20yo+      Most Recent Value   SBIRT (24 yo +)    In order to provide better care to our patients, we are screening all of our patients for alcohol and drug use  Would it be okay to ask you these screening questions? Yes Filed at: 02/23/2022 2833   Initial Alcohol Screen: US AUDIT-C     1  How often do you have a drink containing alcohol? 1 Filed at: 02/23/2022 0714   2  How many drinks containing alcohol do you have on a typical day you are drinking? 0 Filed at: 02/23/2022 0714   3a  Male UNDER 65: How often do you have five or more drinks on one occasion? 0 Filed at: 02/23/2022 0714   3b  FEMALE Any Age, or MALE 65+: How often do you have 4 or more drinks on one occassion? 0 Filed at: 02/23/2022 8972   Audit-C Score 1 Filed at: 02/23/2022 2971   BRUCE: How many times in the past year have you    Used an illegal drug or used a prescription medication for non-medical reasons? Never Filed at: 02/23/2022 0036                    MDM    Disposition  Final diagnoses:    Allergic reaction, initial encounter     Time reflects when diagnosis was documented in both MDM as applicable and the Disposition within this note     Time User Action Codes Description Comment    2/23/2022  7:19 AM Leo Mandel Add [[id number] [id number]] Allergic reaction, initial encounter       ED Disposition     ED Disposition Condition Date/Time Comment    Discharge Stable Wed Feb 23, 2022  7:19 AM Joann Blas discharge to home/self care  Follow-up Information     Follow up With Specialties Details Why [address] , DO Family Medicine On 2/28/2022  [facility]  [place] [address]  [phone]            Discharge Medication List as of 2/23/2022  7:20 AM      START taking these medications    Details   methylPREDNISolone 4 MG tablet therapy pack Use as directed on package, Normal         CONTINUE these medications which have NOT CHANGED    Details   acetaminophen (TYLENOL) 500 mg tablet Take 2 tablets (1,000 mg total) by mouth every 6 (six) hours as needed for mild pain or moderate pain, Starting Sat 4/24/2021, Normal      Aimovig 140 MG/ML SOAJ Inject 140mg under the skin every 30 (thirty) days  , Normal      ALPRAZolam (XANAX) 0 5 mg tablet Take 1 tablet (0 5 mg total) by mouth daily as needed for anxiety, Starting Fri 12/10/2021, Normal      ARIPiprazole (ABILIFY) 2 mg tablet Take 1 tablet (2 mg total) by mouth daily, Starting Fri 12/10/2021, Normal      !! B-D 3CC LUER-DEANN SYR 25GX1" 25G X 1" 3 ML MISC Starting Thu 7/26/2018, Historical Med      buPROPion (WELLBUTRIN XL) 150 mg 24 hr tablet Take 1 tablet (150 mg total) by mouth daily, Starting Fri 95/3795/37/4914, Normal      Certolizumab Pegol (Cimzia Starter Kit) 6 X 200 MG/ML KIT Inject 1 application under the skin every 30 (thirty) days, Starting Wed 2/2/2022, Historical Med      Enbrel SureClick 50 MG/ML injection Inject 50 mg under the skin once a week  , Starting Sat 11/20/2021, Historical Med      escitalopram (LEXAPRO) 20 mg tablet Take 1 tablet (20 mg total) by mouth daily, Starting Fri 12/10/2021, Normal      etonogestrel-ethinyl estradiol (NuvaRing) 0 12-0 015 MG/24HR vaginal ring Insert ring for 21 days then remove for one week , Normal ketorolac (TORADOL) 10 mg tablet Take 1 tablet (10 mg total) by mouth every 6 (six) hours as needed (migraine) Max 2-3 per week , Starting Tue 9/14/2021, Normal      melatonin 1 mg 1-2 tabs qhs prn insomnia, Normal      olmesartan (BENICAR) 20 mg tablet Take 1 tablet (20 mg total) by mouth daily, Starting Wed 12/8/2021, Normal      onabotulinumtoxin A (BOTOX) 100 units Inject as directed, Starting Tue 8/29/2017, Historical Med      pantoprazole (PROTONIX) 40 mg tablet Take 1 tablet (40 mg total) by mouth daily before breakfast, Starting Fri 9/17/2021, Normal      promethazine (PHENERGAN) 25 mg tablet Take 1 tablet (25 mg total) by mouth every 6 (six) hours as needed for nausea or vomiting, Starting Fri 1/14/2022, Normal      !! Syringe/Needle, Disp, (SYRINGE 3CC/34OS0-4/4") 27G X 1-1/4" 3 ML MISC Use for IM injection of ketorolac , Normal      valACYclovir (VALTREX) 500 mg tablet Take 1 tablet (500 mg total) by mouth daily, Starting Tue 9/7/2021, Until Mon 12/6/2021, Normal      vitamin B-12 (VITAMIN B-12) 1,000 mcg tablet Take 1,000 mcg by mouth daily , Historical Med      metFORMIN (GLUCOPHAGE-XR) 500 mg 24 hr tablet Take 2 tablets (1,000 mg total) by mouth daily with breakfast, Starting Mon 12/20/2021, Normal       !! - Potential duplicate medications found  Please discuss with provider  No discharge procedures on file      PDMP Review       Value Time User    PDMP Reviewed  Yes 12/10/2021 10:24 AM Roshni Amaro MD          ED Provider  Electronically Signed by           Norma Howell DO  02/23/22 9622

## 2022-02-25 ENCOUNTER — TELEMEDICINE (OUTPATIENT)
Dept: BEHAVIORAL/MENTAL HEALTH CLINIC | Facility: CLINIC | Age: 32
End: 2022-02-25
Payer: COMMERCIAL

## 2022-02-25 DIAGNOSIS — F33.1 DEPRESSION, MAJOR, RECURRENT, MODERATE (HCC): Primary | Chronic | ICD-10-CM

## 2022-02-25 DIAGNOSIS — F41.1 GENERALIZED ANXIETY DISORDER: Chronic | ICD-10-CM

## 2022-02-25 PROCEDURE — 90834 PSYTX W PT 45 MINUTES: CPT | Performed by: SOCIAL WORKER

## 2022-02-25 NOTE — PSYCH
Virtual Regular Visit    Verification of patient location:    Patient is located in the following state in which I hold an active license [place]    Assessment/Plan:    Problem List Items Addressed This Visit        Other    Generalized anxiety disorder (Chronic)    Depression, major, recurrent, moderate (HCC) - Primary (Chronic)        Goals addressed in session: Goal 1      Reason for visit is No chief complaint on file  Encounter provider APARNA Ham    Provider located at [address] [place] [id number] [phone]    Recent Visits  No visits were found meeting these conditions  Showing recent visits within past 7 days and meeting all other requirements  Future Appointments  No visits were found meeting these conditions  Showing future appointments within next 150 days and meeting all other requirements     The patient was identified by name and date of birth  Callie Corley was informed that this is a telemedicine visit and that the visit is being conducted throughEpic Embedded and patient was informed this is a secure, HIPAA-complaint platform  She agrees to proceed     My office door was closed  No one else was in the room  She acknowledged consent and understanding of privacy and security of the video platform  The patient has agreed to participate and understands they can discontinue the visit at any time  Patient is aware this is a billable service  Erlinda Garza is a 28 y o  female  DATA: Met with Stephanie for scheduled individual session  Topics of discussion included family stressors, physical health concerns and relationship with SO  "I feel like something bad is going to happen " Jenn Notice states that she has been feeling like a "dark cloud" is over her  She states that her boyfriend has gotten a job   She states that she has a significant fear that her boyfriend is going to cheat on her  She states that she has had a dream about him cheating on her  In addition, she states that she had two dreams regarding family members dying (her grandmother and her uncle)  She states that she feels that the primary stress is surrounding her relationship with her SO  We spent some time discussing Stephanie's feelings about her relationship with her SO  She states that she is still experiencing significant ambivalence about the relationship  She was able to acknowledge that she might be waiting for him to cheat on her, so that she will not have to be the one to end the relationship  We discussed engaging in activities that increase her feelings of self-worth and bring her ciara-- She is able to identify that crafting and spending time with her best friend's daughters has been positive for her  She agreed to do more of this in the upcoming weeks  Client shows evidence of utilizing some basic mindfulness-based skills to manage mental health symptoms  During this session, this clinician used the following therapeutic modalities: supportive psychotherapy, client-centered therapy, mindfulness-based strategies, DBT-informed skills, Motivational Interviewing and solution-focused therapy  ASSESSMENT: Tatum Page presents with a dysthymic mood  Her affect is normal range and intensity, appropriate  Tatum Page exhibits good therapeutic rapport with this clinician  Tatum Page continues to exhibit willingness to work on treatment goals and objectives  Tatum Page presents with a minimal risk of suicide, minimal risk of self-harm, and minimal risk of harm to others  PLAN: Tatum Page will return in two weeks for the next scheduled session  Between sessions, Tatum Page will spend time engaging in activities that increase her feelings of competence and confidence  She will report back during the next session re: successes and barriers   At the next session, this clinician will use supportive psychotherapy, client-centered therapy, mindfulness-based strategies, DBT-informed skills, Motivational Interviewing and solution-focused therapy to address her mood regulation and relationship concerns, in an effort to assist Alvin Marquis with meeting treatment goals  HPI     Past Medical History:   Diagnosis Date    Abnormal Pap smear of cervix     Anxiety     Arthritis     Depression     Fibromyalgia, primary     IBS (irritable bowel syndrome)     Migraine     Obesity     Vitamin B12 deficiency        Past Surgical History:   Procedure Laterality Date    COLONOSCOPY N/A 5/8/2018    Procedure: COLONOSCOPY;  Surgeon: Marlen Nguyen MD;  Location: [facility] GI LAB; Service: Gastroenterology    TX EXC SKIN BENIG >4 CM REMAINDR BODY N/A 7/1/2021    Procedure: EXCISION OF PILAR SCALP CYST X 2 AND RIGHT INNER GROIN NEVVUS;  Surgeon: Camila Martin MD;  Location: [address] [place] [address] MAIN OR;  Service: Plastics    TX RECMPL WND SCALP,EXTR 2 6-7 5 CM N/A 7/1/2021    Procedure: CLOSURE WOUND SCALP X 2 AND RIGHT INNER GROIN;  Surgeon: Camila Martin MD;  Location:  MAIN OR;  Service: Plastics    TONSILLECTOMY      WISDOM TOOTH EXTRACTION         Current Outpatient Medications   Medication Sig Dispense Refill    acetaminophen (TYLENOL) 500 mg tablet Take 2 tablets (1,000 mg total) by mouth every 6 (six) hours as needed for mild pain or moderate pain (Patient not taking: Reported on 12/20/2021 ) 100 tablet 0    Aimovig 140 MG/ML SOAJ Inject 140mg under the skin every 30 (thirty) days   1 pen 10    ALPRAZolam (XANAX) 0 5 mg tablet Take 1 tablet (0 5 mg total) by mouth daily as needed for anxiety 30 tablet 2    ARIPiprazole (ABILIFY) 2 mg tablet Take 1 tablet (2 mg total) by mouth daily 30 tablet 2    B-D 3CC LUER-DEANN SYR 25GX1" 25G X 1" 3 ML MISC       buPROPion (WELLBUTRIN XL) 150 mg 24 hr tablet Take 1 tablet (150 mg total) by mouth daily 30 tablet 2    Certolizumab Pegol (Cimzia Starter Kit) 6 X 200 MG/ML KIT Inject 1 application under the skin every 30 (thirty) days      Enbrel SureClick 50 MG/ML injection Inject 50 mg under the skin once a week   (Patient not taking: Reported on 2/10/2022 )      escitalopram (LEXAPRO) 20 mg tablet Take 1 tablet (20 mg total) by mouth daily 30 tablet 2    etonogestrel-ethinyl estradiol (NuvaRing) 0 12-0 015 MG/24HR vaginal ring Insert ring for 21 days then remove for one week  3 each 3    ketorolac (TORADOL) 10 mg tablet Take 1 tablet (10 mg total) by mouth every 6 (six) hours as needed (migraine) Max 2-3 per week   10 tablet 0    melatonin 1 mg 1-2 tabs qhs prn insomnia 60 tablet 2    metFORMIN (GLUCOPHAGE-XR) 500 mg 24 hr tablet Take 2 tablets (1,000 mg total) by mouth daily with breakfast 30 tablet 2    methylPREDNISolone 4 MG tablet therapy pack Use as directed on package 21 tablet 0    olmesartan (BENICAR) 20 mg tablet Take 1 tablet (20 mg total) by mouth daily 30 tablet 2    onabotulinumtoxin A (BOTOX) 100 units Inject as directed      pantoprazole (PROTONIX) 40 mg tablet Take 1 tablet (40 mg total) by mouth daily before breakfast (Patient not taking: Reported on 2/10/2022 ) 30 tablet 5    promethazine (PHENERGAN) 25 mg tablet Take 1 tablet (25 mg total) by mouth every 6 (six) hours as needed for nausea or vomiting (Patient not taking: Reported on 2/10/2022 ) 30 tablet 0    Syringe/Needle, Disp, (SYRINGE 3CC/52IR3-7/4") 27G X 1-1/4" 3 ML MISC Use for IM injection of ketorolac  4 each 0    valACYclovir (VALTREX) 500 mg tablet Take 1 tablet (500 mg total) by mouth daily (Patient not taking: Reported on 12/20/2021 ) 90 tablet 2    vitamin B-12 (VITAMIN B-12) 1,000 mcg tablet Take 1,000 mcg by mouth daily        Current Facility-Administered Medications   Medication Dose Route Frequency Provider Last Rate Last Admin    cyanocobalamin injection 1,000 mcg  1,000 mcg Intramuscular Q30 Days Israel Abdul DO   1,000 mcg at 08/03/20 0859    cyanocobalamin injection 1,000 mcg  1,000 mcg Intramuscular Q14 Days Adriane Maguire Chapincito, DO   1,000 mcg at 05/18/20 1146    cyanocobalamin injection 1,000 mcg  1,000 mcg Intramuscular Q30 Days Verjeronimo Lazo, DO   1,000 mcg at 09/01/20 1129        Allergies   Allergen Reactions    Desvenlafaxine      Other reaction(s): state of confusion    Valproic Acid Other (See Comments)     Other reaction(s): dilated pupils, "schizi"    Tizanidine Anxiety       Review of Systems    Video Exam    There were no vitals filed for this visit  Physical Exam     I spent 45 minutes directly with the patient during this visit    [address] [facility] [address] verbally agrees to participate in [employer]  Pt is aware that [employer] could be limited without vital signs or the ability to perform a full hands-on physical exam  Cruz Alexander understands she or the provider may request at any time to terminate the video visit and request the patient to seek care or treatment in person

## 2022-03-04 ENCOUNTER — TELEPHONE (OUTPATIENT)
Dept: NEUROLOGY | Facility: CLINIC | Age: 32
End: 2022-03-04

## 2022-03-04 DIAGNOSIS — G43.709 CHRONIC MIGRAINE WITHOUT AURA WITHOUT STATUS MIGRAINOSUS, NOT INTRACTABLE: Primary | ICD-10-CM

## 2022-03-04 RX ORDER — INDOMETHACIN 25 MG/1
CAPSULE ORAL
Qty: 30 CAPSULE | Refills: 0 | Status: SHIPPED | OUTPATIENT
Start: 2022-03-04 | End: 2022-07-25 | Stop reason: SDUPTHER

## 2022-03-04 NOTE — TELEPHONE ENCOUNTER
Patient calling for refill of indomethacin  (I do not see this on med list)  If agreeable please send to [facility]

## 2022-03-11 ENCOUNTER — TELEMEDICINE (OUTPATIENT)
Dept: BEHAVIORAL/MENTAL HEALTH CLINIC | Facility: CLINIC | Age: 32
End: 2022-03-11
Payer: COMMERCIAL

## 2022-03-11 DIAGNOSIS — F41.1 GENERALIZED ANXIETY DISORDER: Chronic | ICD-10-CM

## 2022-03-11 DIAGNOSIS — F33.1 DEPRESSION, MAJOR, RECURRENT, MODERATE (HCC): Primary | Chronic | ICD-10-CM

## 2022-03-11 PROCEDURE — 90834 PSYTX W PT 45 MINUTES: CPT | Performed by: SOCIAL WORKER

## 2022-03-11 NOTE — PSYCH
Virtual Regular Visit    Verification of patient location:    Patient is located in the following state in which I hold an active license [place]      Assessment/Plan:    Problem List Items Addressed This Visit        Other    Generalized anxiety disorder (Chronic)    Depression, major, recurrent, moderate (HCC) - Primary (Chronic)        Goals addressed in session: Goal 1      Reason for visit is No chief complaint on file  Encounter provider APARNA Edgar    Provider located at [address] [place] [id number] [phone]    Recent Visits  No visits were found meeting these conditions  Showing recent visits within past 7 days and meeting all other requirements  Future Appointments  No visits were found meeting these conditions  Showing future appointments within next 150 days and meeting all other requirements     The patient was identified by name and date of birth  Chema Jackie was informed that this is a telemedicine visit and that the visit is being conducted throughEpic Embedded and patient was informed this is a secure, HIPAA-complaint platform  She agrees to proceed     My office door was closed  No one else was in the room  She acknowledged consent and understanding of privacy and security of the video platform  The patient has agreed to participate and understands they can discontinue the visit at any time  Patient is aware this is a billable service  Rebecca Figueroa is a 28 y o  female  DATA: Met with Stephanie for scheduled individual session  Topics of discussion included relationships with family, physical health concerns and relationship with SO  "It's a plot-twist to my life  Archana Nixon broke up with me " Mumtaz Guerrero states that a week ago, Archana Nixon broke up with her  She states that she has mixed emotions about this  She states that she continues to speak with him on a daily basis  She identifies that this is not a relationship that she wants for the long-term, and she does not feel badly that they are no longer dating; however, she feels that she needs to help him to not "go to a dark place " We discussed the importance of her taking care of her own social and emotional needs  She states that she has been spending time with her best friend and her best friend's daughters  We discussed devoting more time to engaging in activities that bring her ciara and help her to feel more confident and competent  Niya Marcos states that her mother continues to live in her grandmother's portion of the house  They continue to speak with one another regularly  Niya Marcos states that she is hoping that the spring weather will further enable her to get out of her home and become more active  Client shows evidence of utilizing distress tolerance skills skills to manage mental health symptoms  During this session, this clinician used the following therapeutic modalities: supportive psychotherapy, client-centered therapy, mindfulness-based strategies, DBT-informed skills, Motivational Interviewing and solution-focused therapy  ASSESSMENT: Niya Marcos presents with a somewhat dysthymic mood  Her affect is normal range and intensity, appropriate  Niya Marcos exhibits good therapeutic rapport with this clinician  Niya Marcos continues to exhibit willingness to work on treatment goals and objectives  Niya Marcos presents with a minimal risk of suicide, minimal risk of self-harm, and minimal risk of harm to others  PLAN: Niya Marcos will return in two weeks for the next scheduled session  Between sessions, Niya Marcos will work on setting and maintaining personal limits re: the frequency of her interactions with her ex-bf  She will also engage in more social activities and personal activities that bring her ciara  She will report back during the next session re: successes and barriers   At the next session, this clinician will use supportive psychotherapy, client-centered therapy, mindfulness-based strategies, DBT-informed skills, Motivational Interviewing and solution-focused therapy to address her mood regulation and relationship concerns, in an effort to assist Alvin Marquis with meeting treatment goals  HPI     Past Medical History:   Diagnosis Date    Abnormal Pap smear of cervix     Anxiety     Arthritis     Depression     Fibromyalgia, primary     IBS (irritable bowel syndrome)     Migraine     Obesity     Vitamin B12 deficiency        Past Surgical History:   Procedure Laterality Date    COLONOSCOPY N/A 5/8/2018    Procedure: COLONOSCOPY;  Surgeon: Jaki Page MD;  Location: [facility] GI LAB; Service: Gastroenterology    IN EXC SKIN BENIG >4 CM REMAINDR BODY N/A 7/1/2021    Procedure: EXCISION OF PILAR SCALP CYST X 2 AND RIGHT INNER GROIN NEVVUS;  Surgeon: Coleen Mensah MD;  Location: [address] [place] [address] MAIN OR;  Service: Plastics    IN RECMPL WND SCALP,EXTR 2 6-7 5 CM N/A 7/1/2021    Procedure: CLOSURE WOUND SCALP X 2 AND RIGHT INNER GROIN;  Surgeon: Coleen Mensah MD;  Location:  MAIN OR;  Service: Plastics    TONSILLECTOMY      WISDOM TOOTH EXTRACTION         Current Outpatient Medications   Medication Sig Dispense Refill    acetaminophen (TYLENOL) 500 mg tablet Take 2 tablets (1,000 mg total) by mouth every 6 (six) hours as needed for mild pain or moderate pain (Patient not taking: Reported on 12/20/2021 ) 100 tablet 0    Aimovig 140 MG/ML SOAJ Inject 140mg under the skin every 30 (thirty) days   1 pen 10    ALPRAZolam (XANAX) 0 5 mg tablet Take 1 tablet (0 5 mg total) by mouth daily as needed for anxiety 30 tablet 2    ARIPiprazole (ABILIFY) 2 mg tablet Take 1 tablet (2 mg total) by mouth daily 30 tablet 2    B-D 3CC LUER-DEANN SYR 25GX1" 25G X 1" 3 ML MISC       buPROPion (WELLBUTRIN XL) 150 mg 24 hr tablet Take 1 tablet (150 mg total) by mouth daily 30 tablet 2    Certolizumab Pegol (Cimzia Starter Kit) 6 X 200 MG/ML KIT Inject 1 application under the skin every 30 (thirty) days      Enbrel SureClick 50 MG/ML injection Inject 50 mg under the skin once a week   (Patient not taking: Reported on 2/10/2022 )      escitalopram (LEXAPRO) 20 mg tablet Take 1 tablet (20 mg total) by mouth daily 30 tablet 2    etonogestrel-ethinyl estradiol (NuvaRing) 0 12-0 015 MG/24HR vaginal ring Insert ring for 21 days then remove for one week  3 each 3    indomethacin (INDOCIN) 25 mg capsule 1 tab BID prn severe headache with food  Hold toradol  30 capsule 0    ketorolac (TORADOL) 10 mg tablet Take 1 tablet (10 mg total) by mouth every 6 (six) hours as needed (migraine) Max 2-3 per week   10 tablet 0    melatonin 1 mg 1-2 tabs qhs prn insomnia 60 tablet 2    metFORMIN (GLUCOPHAGE-XR) 500 mg 24 hr tablet Take 2 tablets (1,000 mg total) by mouth daily with breakfast 30 tablet 2    methylPREDNISolone 4 MG tablet therapy pack Use as directed on package 21 tablet 0    olmesartan (BENICAR) 20 mg tablet Take 1 tablet (20 mg total) by mouth daily 30 tablet 2    onabotulinumtoxin A (BOTOX) 100 units Inject as directed      pantoprazole (PROTONIX) 40 mg tablet Take 1 tablet (40 mg total) by mouth daily before breakfast (Patient not taking: Reported on 2/10/2022 ) 30 tablet 5    promethazine (PHENERGAN) 25 mg tablet Take 1 tablet (25 mg total) by mouth every 6 (six) hours as needed for nausea or vomiting (Patient not taking: Reported on 2/10/2022 ) 30 tablet 0    Syringe/Needle, Disp, (SYRINGE 3CC/42WT1-5/4") 27G X 1-1/4" 3 ML MISC Use for IM injection of ketorolac  4 each 0    valACYclovir (VALTREX) 500 mg tablet Take 1 tablet (500 mg total) by mouth daily (Patient not taking: Reported on 12/20/2021 ) 90 tablet 2    vitamin B-12 (VITAMIN B-12) 1,000 mcg tablet Take 1,000 mcg by mouth daily        Current Facility-Administered Medications   Medication Dose Route Frequency Provider Last Rate Last Admin    cyanocobalamin injection 1,000 mcg  1,000 mcg Intramuscular Q30 Days Megan Layman, DO   1,000 mcg at 08/03/20 8703    cyanocobalamin injection 1,000 mcg  1,000 mcg Intramuscular Q14 Days Meganlandon Preciado, DO   1,000 mcg at 05/18/20 1146    cyanocobalamin injection 1,000 mcg  1,000 mcg Intramuscular Q30 Days Megan Alicjaman, DO   1,000 mcg at 09/01/20 1129        Allergies   Allergen Reactions    Desvenlafaxine      Other reaction(s): state of confusion    Valproic Acid Other (See Comments)     Other reaction(s): dilated pupils, "schizi"    Tizanidine Anxiety       Review of Systems    Video Exam    There were no vitals filed for this visit  Physical Exam     I spent 45 minutes directly with the patient during this visit    [address] [facility] [address] verbally agrees to participate in [employer]  Pt is aware that [employer] could be limited without vital signs or the ability to perform a full hands-on physical exam  Cruz Kaplan understands she or the provider may request at any time to terminate the video visit and request the patient to seek care or treatment in person

## 2022-03-15 ENCOUNTER — TELEMEDICINE (OUTPATIENT)
Dept: PSYCHIATRY | Facility: CLINIC | Age: 32
End: 2022-03-15

## 2022-03-15 ENCOUNTER — TELEPHONE (OUTPATIENT)
Dept: PSYCHIATRY | Facility: CLINIC | Age: 32
End: 2022-03-15

## 2022-03-15 DIAGNOSIS — G43.709 CHRONIC MIGRAINE WITHOUT AURA WITHOUT STATUS MIGRAINOSUS, NOT INTRACTABLE: ICD-10-CM

## 2022-03-15 DIAGNOSIS — I10 PRIMARY HYPERTENSION: ICD-10-CM

## 2022-03-15 DIAGNOSIS — F33.1 DEPRESSION, MAJOR, RECURRENT, MODERATE (HCC): Primary | Chronic | ICD-10-CM

## 2022-03-15 RX ORDER — OLMESARTAN MEDOXOMIL 20 MG/1
20 TABLET ORAL DAILY
Qty: 30 TABLET | Refills: 1 | Status: SHIPPED | OUTPATIENT
Start: 2022-03-15 | End: 2022-05-17

## 2022-03-15 RX ORDER — ERENUMAB-AOOE 140 MG/ML
INJECTION, SOLUTION SUBCUTANEOUS
Qty: 1 ML | Refills: 10 | Status: SHIPPED | OUTPATIENT
Start: 2022-03-15

## 2022-03-15 NOTE — PSYCH
No Call  No Show  No Charge    Cruz Schneider no showed 03/15/22 appointment , staff called and left message to reschedule appointment     Treatment Plan not due at this session

## 2022-03-15 NOTE — TELEPHONE ENCOUNTER
I spoke with the patient  I believe the malfunction of the virtual system this morning was responsible for the unsuccessful vv with the patient  I cancelled and rescheduled her for a later date  She would like to know if you will be either increasing her dosage of Xanax or increasing the # of pills taken per day  If doubling the # of pills, she will need a refill  I assured the patient I would call her back with the your decision

## 2022-03-16 ENCOUNTER — TELEPHONE (OUTPATIENT)
Dept: NEUROLOGY | Facility: CLINIC | Age: 32
End: 2022-03-16

## 2022-03-20 ENCOUNTER — OFFICE VISIT (OUTPATIENT)
Dept: URGENT CARE | Facility: CLINIC | Age: 32
End: 2022-03-20
Payer: COMMERCIAL

## 2022-03-20 VITALS
OXYGEN SATURATION: 97 % | SYSTOLIC BLOOD PRESSURE: 135 MMHG | HEART RATE: 96 BPM | DIASTOLIC BLOOD PRESSURE: 83 MMHG | RESPIRATION RATE: 18 BRPM | TEMPERATURE: 97.8 F

## 2022-03-20 DIAGNOSIS — M26.609 TMJ (TEMPOROMANDIBULAR JOINT DISORDER): Primary | ICD-10-CM

## 2022-03-20 PROCEDURE — 99213 OFFICE O/P EST LOW 20 MIN: CPT

## 2022-03-20 RX ORDER — NAPROXEN 500 MG/1
500 TABLET ORAL 2 TIMES DAILY WITH MEALS
Qty: 28 TABLET | Refills: 0 | Status: SHIPPED | OUTPATIENT
Start: 2022-03-20 | End: 2022-04-21

## 2022-03-20 NOTE — PATIENT INSTRUCTIONS
Use naproxen as prescribed- this is an NSAID, do not combine with any other NSAID use  Joint rest  Follow-up with ENT  PCP follow-up in 3-5 days  Proceed to ER if symptoms worsen  Call your rheumatologist to see if you can get in sooner for visit  Temporomandibular Disorder   WHAT YOU NEED TO KNOW:   Temporomandibular disorder is a condition that causes pain in your jaw  The disorder affects the joint between your temporal bone and your mandible (jawbone)  The muscles and nerves around the joint are also affected  DISCHARGE INSTRUCTIONS:   Medicines:   · Pain medicine: You may be given a prescription medicine to decrease pain  Do not wait until the pain is severe before you take this medicine  · NSAIDs:  These medicines decrease swelling and pain  You can buy NSAIDs without a doctor's order  Ask your healthcare provider which medicine is right for you, and how much to take  Take as directed  NSAIDs can cause stomach bleeding or kidney problems if not taken correctly  · Muscle relaxers  help decrease pain and muscle spasms  · Take your medicine as directed  Contact your healthcare provider if you think your medicine is not helping or if you have side effects  Tell him or her if you are allergic to any medicine  Keep a list of the medicines, vitamins, and herbs you take  Include the amounts, and when and why you take them  Bring the list or the pill bottles to follow-up visits  Carry your medicine list with you in case of an emergency  Follow up with your doctor as directed:  Write down your questions so you remember to ask them during your visits  Manage your symptoms:   · Eat soft foods: Your healthcare provider may suggest that you eat only soft foods for several days  A dietitian may work with you to find foods that are easier to bite, chew, or swallow  Examples are soup, applesauce, cottage cheese, pudding, yogurt, and soft fruits       · Use jaw supporting devices:  Splints may be used to support your jaw or keep it from moving  You may need to wear a mouth guard to keep you from clenching or grinding your teeth while you are sleeping  · Use ice and heat:  Ice helps decrease swelling and pain  Ice may also help prevent tissue damage  Use an ice pack, or put crushed ice in a plastic bag  Cover it with a towel and place it on your jaw for 15 to 20 minutes every hour or as directed  After the first 24 to 48 hours, use heat to decrease pain, swelling, and muscle spasms  Apply heat on the area for 20 to 30 minutes every 2 hours for as many days as directed  Use a heating pad, moist warm compress, or a hot water bottle  · Go to physical therapy:  A physical therapist teaches you exercises to help improve movement and strength, and to decrease pain in your jaw  A speech therapist may also help you with swallowing and speech exercises  Contact your healthcare provider if:   · You have a fever  · Your splint or mouth guard is loose  · You have questions or concerns about your condition or care  Seek care immediately or call 911 if:   · You have nausea, are vomiting, or cannot keep liquids down  · You have pain that does not go away even after you take your pain medicine  · You have problems breathing, talking, drinking, eating, or swallowing  · Your splint or mouth guard gets damaged or broken  © Copyright [employer] 2022 Information is for End User's use only and may not be sold, redistributed or otherwise used for commercial purposes  All illustrations and images included in CareNotes® are the copyrighted property of A D A M , Inc  or [facility] Ainsley Velasquez   The above information is an  only  It is not intended as medical advice for individual conditions or treatments  Talk to your doctor, nurse or pharmacist before following any medical regimen to see if it is safe and effective for you

## 2022-03-20 NOTE — PROGRESS NOTES
[facility]        NAME: Sage Mom is a 28 y o  female  : 1990    MRN: [id number]  DATE: 2022  TIME: 6:06 PM      Assessment and Plan     Jaw pain [R68 [id number]]  1  Jaw pain  naproxen (Naprosyn) 500 mg tablet    Ambulatory Referral to Otolaryngology    CANCELED: Ambulatory Referral to Otolaryngology       Will treat with naproxen d/t arthritis history  Referral made for ENT  Patient Instructions     Use naproxen as prescribed- this is an NSAID, do not combine with any other NSAID use  Joint rest  Follow-up with ENT  PCP follow-up in 3-5 days  Proceed to ER if symptoms worsen  Call your rheumatologist to see if you can get in sooner for visit  Chief Complaint     Chief Complaint   Patient presents with    Jaw Pain     x3 days: flare up of ankylosing spondylitis which is causing her severe B/L Inner ear pain & B/L jaw pain (8/10), She feels as though she is having a difficult time opening mouth & jaw feels slightly locked into position  History of Present Illness     Patient is a 52-year-old female who presents with bilateral jaw pain for three days  Reports history of TMJ last year  State she does have Ankylosing spondylitis and feels like she is having a flare up  Reports decrease opening of jaw that her jaw feels locked  Denies fever or chills  Denies drooling  Denies sensation of throat closing  Denies trauma to mouth  Reports no chance of pregnancy, on continuous birth control  Review of Systems     Review of Systems   Constitutional: Negative for chills and fever  HENT: Negative for drooling, sore throat and trouble swallowing  Jaw pain   Gastrointestinal: Negative for nausea and vomiting  Musculoskeletal: Positive for joint swelling and myalgias  All other systems reviewed and are negative  Current Medications       Current Outpatient Medications:     Aimovig 140 MG/ML SOAJ, Inject 140mg under the skin every 30 (thirty) days  , Disp: 1 mL, Rfl: 10    ALPRAZolam (XANAX) 0 5 mg tablet, Take 1 tablet (0 5 mg total) by mouth 2 (two) times a day as needed for anxiety, Disp: 60 tablet, Rfl: 2    ARIPiprazole (ABILIFY) 2 mg tablet, Take 1 tablet (2 mg total) by mouth daily, Disp: 30 tablet, Rfl: 2    buPROPion (WELLBUTRIN XL) 150 mg 24 hr tablet, Take 1 tablet (150 mg total) by mouth daily, Disp: 30 tablet, Rfl: 2    escitalopram (LEXAPRO) 20 mg tablet, Take 1 tablet (20 mg total) by mouth daily, Disp: 30 tablet, Rfl: 2    etonogestrel-ethinyl estradiol (NuvaRing) 0 12-0 015 MG/24HR vaginal ring, Insert ring for 21 days then remove for one week , Disp: 3 each, Rfl: 3    indomethacin (INDOCIN) 25 mg capsule, 1 tab BID prn severe headache with food   Hold toradol , Disp: 30 capsule, Rfl: 0    ketorolac (TORADOL) 10 mg tablet, Take 1 tablet (10 mg total) by mouth every 6 (six) hours as needed (migraine) Max 2-3 per week , Disp: 10 tablet, Rfl: 0    melatonin 1 mg, 1-2 tabs qhs prn insomnia, Disp: 60 tablet, Rfl: 2    metFORMIN (GLUCOPHAGE-XR) 500 mg 24 hr tablet, Take 2 tablets (1,000 mg total) by mouth daily with breakfast, Disp: 30 tablet, Rfl: 2    methylPREDNISolone 4 MG tablet therapy pack, Use as directed on package, Disp: 21 tablet, Rfl: 0    olmesartan (BENICAR) 20 mg tablet, Take 1 tablet (20 mg total) by mouth daily, Disp: 30 tablet, Rfl: 1    onabotulinumtoxin A (BOTOX) 100 units, Inject as directed, Disp: , Rfl:     vitamin B-12 (VITAMIN B-12) 1,000 mcg tablet, Take 1,000 mcg by mouth daily , Disp: , Rfl:     B-D 3CC LUER-DEANN SYR 25GX1" 25G X 1" 3 ML MISC, , Disp: , Rfl:     Certolizumab Pegol (Cimzia Starter Kit) 6 X 200 MG/ML KIT, Inject 1 application under the skin every 30 (thirty) days (Patient not taking: Reported on 3/20/2022 ), Disp: , Rfl:     naproxen (Naprosyn) 500 mg tablet, Take 1 tablet (500 mg total) by mouth 2 (two) times a day with meals for 14 days, Disp: 28 tablet, Rfl: 0    Syringe/Needle, Disp, (SYRINGE 3CC/73TB6-4/4") 27G X 1-1/4" 3 ML MISC, Use for IM injection of ketorolac  (Patient not taking: Reported on 3/20/2022 ), Disp: 4 each, Rfl: 0    Current Facility-Administered Medications:     cyanocobalamin injection 1,000 mcg, 1,000 mcg, Intramuscular, Q30 Days, Shawn Na, DO, 1,000 mcg at 08/03/20 4146    cyanocobalamin injection 1,000 mcg, 1,000 mcg, Intramuscular, Q14 Days, Shawn Na, DO, 1,000 mcg at 05/18/20 1146    cyanocobalamin injection 1,000 mcg, 1,000 mcg, Intramuscular, Q30 Days, Shawn Na, DO, 1,000 mcg at 09/01/20 1129    Current Allergies     Allergies as of 03/20/2022 - Reviewed 03/20/2022   Allergen Reaction Noted    Desvenlafaxine  11/26/2012    Valproic acid Other (See Comments) 10/18/2017    Tizanidine Anxiety 01/20/2021              The following portions of the patient's history were reviewed and updated as appropriate: allergies, current medications, past family history, past medical history, past social history, past surgical history and problem list      Past Medical History:   Diagnosis Date    Abnormal Pap smear of cervix     Anxiety     Arthritis     Depression     Fibromyalgia, primary     IBS (irritable bowel syndrome)     Migraine     Obesity     Vitamin B12 deficiency        Past Surgical History:   Procedure Laterality Date    COLONOSCOPY N/A 5/8/2018    Procedure: COLONOSCOPY;  Surgeon: Hernán Santizo MD;  Location: [facility] GI LAB;   Service: Gastroenterology    AK EXC SKIN BENIG >4 CM REMAINDR BODY N/A 7/1/2021    Procedure: EXCISION OF PILAR SCALP CYST X 2 AND RIGHT INNER GROIN NEVVUS;  Surgeon: Gabino Bañuelos MD;  Location: [facility] MAIN OR;  Service: Plastics    AK RECMPL WND SCALP,EXTR 2 6-7 5 CM N/A 7/1/2021    Procedure: CLOSURE WOUND SCALP X 2 AND RIGHT INNER GROIN;  Surgeon: Gabino Bañuelos MD;  Location:  MAIN OR;  Service: Plastics    TONSILLECTOMY      WISDOM TOOTH EXTRACTION         Family History   Problem Relation Age of Onset    Rheum arthritis Mother     Psoriasis Mother     Other Mother     Hypertension Mother     Diabetes unspecified Mother     Sjogren's syndrome Mother     Alcohol abuse Mother     Drug abuse Mother     Anxiety disorder Father     Alcohol abuse Father     Lung cancer Maternal Grandfather     Cancer Other         bone    Diabetes Other     Other Other         High blood pressure    Depression Maternal Grandmother          Medications have been verified  Objective     /83   Pulse 96   Temp 97 8 °F (36 6 °C)   Resp 18   SpO2 97%   No LMP recorded  (Menstrual status: Birth Control)  Physical Exam     Physical Exam  Vitals and nursing note reviewed  Constitutional:       General: She is awake  She is not in acute distress  Appearance: Normal appearance  She is not ill-appearing or toxic-appearing  HENT:      Head:      Jaw: Tenderness and pain on movement present  No trismus, swelling or malocclusion  Right Ear: Tympanic membrane, ear canal and external ear normal  Tympanic membrane is not injected or erythematous  Left Ear: Tympanic membrane, ear canal and external ear normal  Tympanic membrane is not injected or erythematous  Nose: Nose normal       Mouth/Throat:      Lips: Pink  Mouth: Mucous membranes are moist       Pharynx: Oropharynx is clear  Uvula midline  No pharyngeal swelling, oropharyngeal exudate, posterior oropharyngeal erythema or uvula swelling  Cardiovascular:      Rate and Rhythm: Normal rate  Pulses: Normal pulses  Heart sounds: Normal heart sounds, S1 normal and S2 normal    Pulmonary:      Effort: Pulmonary effort is normal       Breath sounds: Normal breath sounds and air entry  No stridor, decreased air movement or transmitted upper airway sounds  No decreased breath sounds  Musculoskeletal:      Cervical back: Neck supple  Lymphadenopathy:      Cervical: No cervical adenopathy  Skin:     General: Skin is warm  Capillary Refill: Capillary refill takes less than 2 seconds  Neurological:      Mental Status: She is alert  Psychiatric:         Mood and Affect: Mood normal          Behavior: Behavior normal          Thought Content:  Thought content normal          Judgment: Judgment normal

## 2022-03-22 ENCOUNTER — OFFICE VISIT (OUTPATIENT)
Dept: NEUROLOGY | Facility: CLINIC | Age: 32
End: 2022-03-22
Payer: COMMERCIAL

## 2022-03-22 VITALS
HEIGHT: 63 IN | SYSTOLIC BLOOD PRESSURE: 141 MMHG | WEIGHT: 257.5 LBS | BODY MASS INDEX: 45.62 KG/M2 | DIASTOLIC BLOOD PRESSURE: 94 MMHG | HEART RATE: 83 BPM | TEMPERATURE: 97.5 F

## 2022-03-22 DIAGNOSIS — G43.709 CHRONIC MIGRAINE WITHOUT AURA WITHOUT STATUS MIGRAINOSUS, NOT INTRACTABLE: Primary | ICD-10-CM

## 2022-03-22 PROCEDURE — 99213 OFFICE O/P EST LOW 20 MIN: CPT | Performed by: PHYSICIAN ASSISTANT

## 2022-03-22 RX ORDER — KETOROLAC TROMETHAMINE 10 MG/1
10 TABLET, FILM COATED ORAL EVERY 6 HOURS PRN
Qty: 10 TABLET | Refills: 0 | Status: SHIPPED | OUTPATIENT
Start: 2022-03-22

## 2022-03-22 RX ORDER — PREDNISONE 1 MG/1
1 TABLET ORAL DAILY
COMMUNITY
Start: 2022-03-16 | End: 2022-04-04

## 2022-03-22 NOTE — PROGRESS NOTES
[facility]  PATIENT:  Lyn Valle  MRN:  [id number]  :  1990  DATE OF SERVICE:  3/22/2022      Assessment/Plan:        Problem List Items Addressed This Visit        Cardiovascular and Mediastinum    Chronic migraine without aura without status migrainosus, not intractable - Primary  Preventive therapy:   - Aimovig 140mg  - melatonin  - botox injections every 3 months - With botox has had a reduction of at least 7 migraine days with less abortive medication, less ER visits which correlates to headache diary  - vitamin b12  - Wellbutrin 150mg  - Abilify 2mg  - Lexapro 20mg  - Benicar 20mg     Abortive Therapy:   Current:  - toradol for more milder migraines  - indomethacin for more severe migraines     Relevant Medications    ketorolac (TORADOL) 10 mg tablet             History of Present Illness:   Lyn Valle is an 28 y o  right handed female who presents in outpatient neurology clinic for follow up on headaches and botox reauthorization  Grace Katlinvictoriano Was last seen in the office on 2020  Her last botox was 2022  With botox has had a reduction of at least 7 migraine days with less abortive medication, less ER visits which correlates to headache diary  She has had an ED visit for migraines  Last year she had primary stab headaches where she got decadron with improvement  She rarely has gotten another attack, maybe 1-2 times per year  What medications do you take or have you taken for your headaches/pain/mood? Preventive therapy:   Current:  - Aimovig 140mg  - melatonin  - botox injections every 3 months  - vitamin b12  - Wellbutrin 150mg  - Abilify 2mg  - Lexapro 20mg  - Benicar 20mg     Previously failed:  -   Abortive Therapy:   Current:  - toradol  - indomethacin   Previously failed:  -       What is your current pain level? -3/10    How often do the headaches occur? Mild headaches: 4 times per week     Moderate to severe headaches: 4 times per month Are you ever headache free? Yes    Aura/Warning and how long does it last?  No    How long do the headaches last?   Mild headaches: 1 day-3 days  Moderate to severe headaches: maximum 2 days  (prior would last for 1 week)    Where is your headache located? Mild headaches: most bifrontal  Moderate to severe headaches: mostly left frontal     Describe your usual headache? Mild headaches: ache, dull, pressure   Moderate to severe headaches: sharp, aching, throbbing, stabbing  What is the intensity of pain? Mild headaches: 5-6/10  Moderate to severe headaches: 8/10    Associated symptoms:   [x] Decreased appetite  [x] Nausea   [x]Vomiting   [x]Diarrhea  [x] Photophobia   [x]Phonophobia       [x]Osmophobia  [] Lacrimation  [] Nasal congestion/rhinorrhea    [x] Flushing of face  Red ear   [] Stiff or sore neck   [] Dizziness  [] light headed  [x] Problems with concentration  [] Blurred vision   [] Change in pupil size     [] Ptosis  []Facial droop     [] Hands or feet tingle or feel numb/paresthesias - has neuropathy baseline but doesn't worsen with migraine  []Tinnitus   [x]Insomnia  [] Worse with lying down  [x] better with lying down  [x] Prefer to be in a cool, quiet, dark room    Number of days missed per month because of headaches:  Work (or school) days: N/A, unemployed  Social or Family activities: estimated about a handful of times  Headache triggers:    Certain smells, sometimes caffeine, skipping meals, lack of sleep, menstrual cycle     Alternative therapies used in the past for headaches? [] Daith piercing  [] Massage  [] physical therapy  []Acupuncture  []Acupressure []Chiropractor  []Yoga  []biofeedback  [x] TPI - back  [x]Botox  []Epidural injections  [x]CBD/THC - MMJ vape and edible     How many caffeine products to drink a day? Not an every day drinker  How much water to drink a day? 3 bottles per day    Are you current pregnant or planning on getting pregnant?  No, on [facility]  Are you currently breast feeding? No    Sleep: How many hours do you sleep a night on average? Estimates 3 hours - 10 hours  Do you feel well rested when you wake up? Any difficulty falling or staying asleep? sometimes  History of insomnia  Mood:  Any history of anxiety or depression? Anxiety, depression  Do you follow with psychology or psychiatry? Therapy - biweekly; psychiatry - once every 3 months  No S/IHI        Past Medical history:     Past Medical History:   Diagnosis Date    Abnormal Pap smear of cervix     Anxiety     Arthritis     Depression     Fibromyalgia, primary     IBS (irritable bowel syndrome)     Migraine     Obesity     Vitamin B12 deficiency        Patient Active Problem List   Diagnosis    Generalized anxiety disorder    Migraine headache    Cervical radiculitis    Hyperlipidemia    Lumbar radiculopathy    Seasonal allergies    Vitamin B12 deficiency    Vitamin D deficiency    Anterior chest wall pain    Chronic migraine without aura without status migrainosus, not intractable    Alternating constipation and diarrhea    Abnormal bowel habits    Chronic fatigue    Memory loss    Cognitive decline    Migraine with aura and with status migrainosus    Possible pregnancy    Dental infection    Upper respiratory infection    Sleep disturbance    Snoring    Fibromyalgia syndrome    Obesity (BMI 30-39  9)    Intractable migraine with aura without status migrainosus    Hand dermatitis    URI (upper respiratory infection)    Depression, major, recurrent, moderate (HCC)    Irritant contact dermatitis due to oils    Chronic heel pain, left    Other complicated headache syndrome    Insomnia due to medical condition    Well adult exam    Paresthesias    Pharyngitis due to Streptococcus species    Elevated blood pressure reading    Encephalopathy    Pilar cysts    Dysplastic nevi    Arthralgia of multiple sites    Chronic low back pain    Elevated C-reactive protein    Inflammatory spondylopathy ([facility] [address] )    Knee pain    Cystitis    Lower extremity edema    TMJ pain dysfunction syndrome    Morbid obesity with BMI of 40 0-44 9, adult (HCC)    Gastroesophageal reflux disease without esophagitis    Hyperinsulinemia    Blurred vision    Primary hypertension    Diarrhea of infectious origin    Non-intractable vomiting with nausea    Acute non-recurrent maxillary sinusitis       Medications:      Current Outpatient Medications   Medication Sig Dispense Refill    Aimovig 140 MG/ML SOAJ Inject 140mg under the skin every 30 (thirty) days  1 mL 10    ALPRAZolam (XANAX) 0 5 mg tablet Take 1 tablet (0 5 mg total) by mouth 2 (two) times a day as needed for anxiety 60 tablet 2    ARIPiprazole (ABILIFY) 2 mg tablet Take 1 tablet (2 mg total) by mouth daily 30 tablet 2    B-D 3CC LUER-DEANN SYR 25GX1" 25G X 1" 3 ML MISC        buPROPion (WELLBUTRIN XL) 150 mg 24 hr tablet Take 1 tablet (150 mg total) by mouth daily 30 tablet 2    escitalopram (LEXAPRO) 20 mg tablet Take 1 tablet (20 mg total) by mouth daily 30 tablet 2    etonogestrel-ethinyl estradiol (NuvaRing) 0 12-0 015 MG/24HR vaginal ring Insert ring for 21 days then remove for one week  3 each 3    indomethacin (INDOCIN) 25 mg capsule 1 tab BID prn severe headache with food  Hold toradol  30 capsule 0    ketorolac (TORADOL) 10 mg tablet Take 1 tablet (10 mg total) by mouth every 6 (six) hours as needed (migraine) Max 2-3 per week   10 tablet 0    melatonin 1 mg 1-2 tabs qhs prn insomnia 60 tablet 2    metFORMIN (GLUCOPHAGE-XR) 500 mg 24 hr tablet Take 2 tablets (1,000 mg total) by mouth daily with breakfast 30 tablet 2    naproxen (Naprosyn) 500 mg tablet Take 1 tablet (500 mg total) by mouth 2 (two) times a day with meals for 14 days (Patient taking differently: Take 500 mg by mouth if needed  ) 28 tablet 0    olmesartan (BENICAR) 20 mg tablet Take 1 tablet (20 mg total) by mouth daily 30 tablet 1    onabotulinumtoxin A (BOTOX) 100 units Inject as directed      predniSONE 5 mg tablet Take 1 tablet by mouth in the morning On Taper      Syringe/Needle, Disp, (SYRINGE 3CC/60MI5-1/4") 27G X 1-1/4" 3 ML MISC Use for IM injection of ketorolac  4 each 0    vitamin B-12 (VITAMIN B-12) 1,000 mcg tablet Take 1,000 mcg by mouth daily       Certolizumab Pegol (Cimzia Starter Kit) 6 X 200 MG/ML KIT Inject 1 application under the skin every 30 (thirty) days (Patient not taking: Reported on 3/20/2022 )      methylPREDNISolone 4 MG tablet therapy pack Use as directed on package (Patient not taking: Reported on 3/22/2022 ) 21 tablet 0     Current Facility-Administered Medications   Medication Dose Route Frequency Provider Last Rate Last Admin    cyanocobalamin injection 1,000 mcg  1,000 mcg Intramuscular Q30 Days Stan Fell, DO   1,000 mcg at 08/03/20 0859    cyanocobalamin injection 1,000 mcg  1,000 mcg Intramuscular Q14 Days Stan Fell, DO   1,000 mcg at 05/18/20 1146    cyanocobalamin injection 1,000 mcg  1,000 mcg Intramuscular Q30 Days Stan Fell, DO   1,000 mcg at 09/01/20 1129        Allergies:       Allergies   Allergen Reactions    Cimzia [Certolizumab Pegol] Swelling    Desvenlafaxine      Other reaction(s): state of confusion    Valproic Acid Other (See Comments)     Other reaction(s): dilated pupils, "schizi"    Tizanidine Anxiety       Family History:     Family History   Problem Relation Age of Onset    Rheum arthritis Mother     Psoriasis Mother     Other Mother     Hypertension Mother     Diabetes unspecified Mother     Sjogren's syndrome Mother     Alcohol abuse Mother     Drug abuse Mother     Anxiety disorder Father     Alcohol abuse Father     Lung cancer Maternal Grandfather     Cancer Other         bone    Diabetes Other     Other Other         High blood pressure    Depression Maternal Grandmother        Social History:     Social History     Socioeconomic History    Marital status: Single     Spouse name: Not on file    Number of children: 0    Years of education: 15    Highest education level: Not on file   Occupational History    Occupation: Unemployed     Employer: [employer]   Tobacco Use    Smoking status: Never Smoker    Smokeless tobacco: Never Used   Vaping Use    Vaping Use: Former    Start date: 7/1/2019   Substance and Sexual Activity    Alcohol use: Yes     Comment: rarely    Drug use: Yes     Frequency: 2 0 times per week     Types: Marijuana     Comment: Medical marijuana not using     Sexual activity: Yes     Partners: Male     Comment: Nuva ring   Other Topics Concern    Not on file   Social History Narrative    Home:  Living with mom and MGM        Education:    Pt denies any h/o learning disability Dxs but admits to having great difficulty in math requiring a   She reached childhood milestones on time as far as he knows  Graduated HS 2009    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 college art classes--she stopped due to anxiety from dissatisfaction with her classes--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 felt they were telling her what to create  On reflection, she feels it was moreso her anxiety as the cause of her leaving school, and she was using the other reason as an excuse so she would not have to admit to anxiety at that time  She is now very open about her anxiety and does not mind that I have this information in the general social section of her chart  Social Determinants of Health     Financial Resource Strain: High Risk    Difficulty of Paying Living Expenses: Very hard   Food Insecurity: Food Insecurity Present    Worried About Running Out of Food in the Last Year: Often true    [place] of Food in the Last Year: Never true   Transportation Needs: No Transportation Needs    Lack of Transportation (Medical): No    Lack of Transportation (Non-Medical):  No   Physical Activity: Not on file Stress: Not on file   Social Connections: Not on file   Intimate Partner Violence: Not on file   Housing Stability: Not on file            Review of Systems:   Review of Systems  Review of Systems   Constitutional: Negative  HENT: Negative  Eyes: Negative  Respiratory: Negative  Cardiovascular: Negative  Gastrointestinal: Negative  Endocrine: Negative  Genitourinary: Negative  Musculoskeletal: Negative  Skin: Negative  Allergic/Immunologic: Negative  Neurological: Positive for headaches  Hematological: Negative  Psychiatric/Behavioral: Negative        ROS reviewed personally and edited as needed  Objective:   Physical Exam:  /94 (BP Location: Left arm, Patient Position: Sitting, Cuff Size: Standard)   Pulse 83   Temp 97 5 °F (36 4 °C) (Temporal)   Ht 5' 3" (1 6 m)   Wt 117 kg (257 lb 8 oz)   BMI 45 61 kg/m²       Neurologic Exam     Mental Status   Oriented to person, place, and time  Follows 2 step commands  Attention: normal  Concentration: normal    Speech: speech is normal   Level of consciousness: alert  Knowledge: good  Normal comprehension  Cranial Nerves     CN III, IV, VI   Pupils are equal, round, and reactive to light  Extraocular motions are normal    Right pupil: Size: 5 mm  Shape: regular  Reactivity: brisk  Left pupil: Size: 5 mm  Shape: regular  Reactivity: brisk  CN III: no CN III palsy  CN VI: no CN VI palsy  Nystagmus: none   Diplopia: none  Ophthalmoparesis: none  Upgaze: normal  Downgaze: normal  Conjugate gaze: present    CN V   Facial sensation intact  CN VII   Facial expression full, symmetric       CN VIII   Hearing: intact    CN IX, X   Palate: symmetric    CN XI   Right trapezius strength: normal  Left trapezius strength: normal    CN XII   Tongue: not atrophic  Fasciculations: absent  Tongue deviation: none    Motor Exam   Right arm pronator drift: absent  Left arm pronator drift: absent    Strength   Strength 5/5 throughout  Sensory Exam   Light touch normal      Gait, Coordination, and Reflexes     Gait  Gait: normal    Coordination   Finger to nose coordination: abnormal (left finger to nose dysmetria but re-corrects)    Reflexes   Right brachioradialis: 2+  Left brachioradialis: 2+  Right biceps: 2+  Left biceps: 2+  Right patellar: 2+  Left patellar: 2+  Right achilles: 2+  Left achilles: 2+  Right ankle clonus: absent  Left ankle clonus: absent           I have spent 28 minutes with Patient  today in which greater than 50% of this time was spent in counseling/coordination of care regarding Diagnostic results, Prognosis, Risks and benefits of tx options, Intructions for management, Patient and family education, Importance of tx compliance, Risk factor reductions, Impressions and Plan of care as above

## 2022-03-23 ENCOUNTER — TELEPHONE (OUTPATIENT)
Dept: NEUROLOGY | Facility: CLINIC | Age: 32
End: 2022-03-23

## 2022-03-23 ENCOUNTER — OFFICE VISIT (OUTPATIENT)
Dept: FAMILY MEDICINE CLINIC | Facility: CLINIC | Age: 32
End: 2022-03-23
Payer: COMMERCIAL

## 2022-03-23 VITALS
HEIGHT: 63 IN | DIASTOLIC BLOOD PRESSURE: 80 MMHG | TEMPERATURE: 97.9 F | WEIGHT: 257.6 LBS | BODY MASS INDEX: 45.64 KG/M2 | SYSTOLIC BLOOD PRESSURE: 134 MMHG

## 2022-03-23 DIAGNOSIS — M46.90 INFLAMMATORY SPONDYLOPATHY, UNSPECIFIED SPINAL REGION (HCC): Primary | ICD-10-CM

## 2022-03-23 PROCEDURE — 99213 OFFICE O/P EST LOW 20 MIN: CPT | Performed by: FAMILY MEDICINE

## 2022-03-23 RX ORDER — PREDNISONE 10 MG/1
TABLET ORAL
Qty: 30 TABLET | Refills: 0 | Status: SHIPPED | OUTPATIENT
Start: 2022-03-23 | End: 2022-04-21

## 2022-03-23 NOTE — TELEPHONE ENCOUNTER
Pt's pharmacy states Toradol requires a PA    Submitted PA on M  Key #   [id number]  Will await insurance determination

## 2022-03-23 NOTE — PROGRESS NOTES
Assessment/Plan:       Diagnoses and all orders for this visit:    Inflammatory spondylopathy, unspecified spinal region ([facility] [address] )  -     predniSONE 10 mg tablet; 5 pills daily for 2 days, 4 for 2 days, 3 for 2 days, 2 for 2 days, 1 for 2 days  Subjective:        Patient ID: [place] is a 28 y o  female  Patient is here with exacerbation ankylosing spondylitis  Patient having TMJ left greater than right  Patient also with some occipital pain and neck pain  Patient took naproxen without any significant improvement from doctors at urgent care  Patient received some prednisone from rheumatologist  No other URI or cold symptoms noted  The following portions of the patient's history were reviewed and updated as appropriate: allergies, current medications, past family history, past medical history, past social history, past surgical history and problem list       Review of Systems   Constitutional: Negative  HENT: Negative  Eyes: Negative  Respiratory: Negative  Cardiovascular: Negative  Gastrointestinal: Negative  Endocrine: Negative  Genitourinary: Negative  Musculoskeletal: Positive for arthralgias  Skin: Negative  Allergic/Immunologic: Negative  Neurological: Negative  Hematological: Negative  Psychiatric/Behavioral: Negative  Objective:               /80   Temp 97 9 °F (36 6 °C)   Ht 5' 3" (1 6 m)   Wt 117 kg (257 lb 9 6 oz)   BMI 45 63 kg/m²          Physical Exam  Vitals and nursing note reviewed  Constitutional:       General: She is not in acute distress  Appearance: Normal appearance  She is not ill-appearing, toxic-appearing or diaphoretic  HENT:      Head: Normocephalic and atraumatic  Right Ear: Tympanic membrane, ear canal and external ear normal  There is no impacted cerumen  Left Ear: Tympanic membrane, ear canal and external ear normal  There is no impacted cerumen        Nose: Nose normal  No congestion or rhinorrhea  Mouth/Throat:      Mouth: Mucous membranes are moist       Pharynx: No oropharyngeal exudate or posterior oropharyngeal erythema  Eyes:      General: No scleral icterus  Right eye: No discharge  Left eye: No discharge  Extraocular Movements: Extraocular movements intact  Conjunctiva/sclera: Conjunctivae normal       Pupils: Pupils are equal, round, and reactive to light  Neck:      Vascular: No carotid bruit  Cardiovascular:      Rate and Rhythm: Normal rate and regular rhythm  Pulses: Normal pulses  Heart sounds: Normal heart sounds  No murmur heard  No friction rub  No gallop  Pulmonary:      Effort: Pulmonary effort is normal  No respiratory distress  Breath sounds: Normal breath sounds  No stridor  No wheezing, rhonchi or rales  Chest:      Chest wall: No tenderness  Musculoskeletal:         General: Tenderness present  No swelling, deformity or signs of injury  Cervical back: Normal range of motion and neck supple  No rigidity  No muscular tenderness  Right lower leg: No edema  Left lower leg: No edema  Comments: Pain with palpation over bilateral TMJ left greater than right  Lymphadenopathy:      Cervical: No cervical adenopathy  Skin:     General: Skin is warm and dry  Capillary Refill: Capillary refill takes less than 2 seconds  Coloration: Skin is not jaundiced  Findings: No bruising, erythema, lesion or rash  Neurological:      Mental Status: She is alert and oriented to person, place, and time  Mental status is at baseline  Cranial Nerves: No cranial nerve deficit  Sensory: No sensory deficit  Motor: No weakness  Coordination: Coordination normal       Gait: Gait normal    Psychiatric:         Mood and Affect: Mood normal          Behavior: Behavior normal          Thought Content:  Thought content normal          Judgment: Judgment normal

## 2022-03-25 ENCOUNTER — TELEMEDICINE (OUTPATIENT)
Dept: BEHAVIORAL/MENTAL HEALTH CLINIC | Facility: CLINIC | Age: 32
End: 2022-03-25
Payer: COMMERCIAL

## 2022-03-25 DIAGNOSIS — F41.1 GENERALIZED ANXIETY DISORDER: Chronic | ICD-10-CM

## 2022-03-25 DIAGNOSIS — F33.1 DEPRESSION, MAJOR, RECURRENT, MODERATE (HCC): Primary | Chronic | ICD-10-CM

## 2022-03-25 PROCEDURE — 90834 PSYTX W PT 45 MINUTES: CPT | Performed by: SOCIAL WORKER
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
## 2022-03-31 NOTE — TELEPHONE ENCOUNTER
Botox: Approve  200 units  Auth: [id number]  Eff 04/01/2022 until 04/01/2023  4 Visit    Please use our stock

## 2022-04-01 NOTE — TELEPHONE ENCOUNTER
Called pt's insurance plan to f/u on status of Toradol [place]  S/w Muriel # [phone]  Tommy KHALIL was denied on 3/24 due to patient being on Naproxen written by another provider  I said that Naproxen is a PRN medication pt uses for her TMJ and Toradol is also PRN medication for her Migraines with a Max dose of 2 to 3 tabs a week  PA resubmitted with above information  Turn around time frame is 24 hours      Follow up on Monday

## 2022-04-04 ENCOUNTER — OFFICE VISIT (OUTPATIENT)
Dept: FAMILY MEDICINE CLINIC | Facility: CLINIC | Age: 32
End: 2022-04-04
Payer: COMMERCIAL

## 2022-04-04 VITALS
OXYGEN SATURATION: 98 % | WEIGHT: 260 LBS | HEIGHT: 63 IN | HEART RATE: 80 BPM | BODY MASS INDEX: 46.07 KG/M2 | SYSTOLIC BLOOD PRESSURE: 110 MMHG | DIASTOLIC BLOOD PRESSURE: 80 MMHG

## 2022-04-04 DIAGNOSIS — J02.0 PHARYNGITIS DUE TO STREPTOCOCCUS SPECIES: Primary | ICD-10-CM

## 2022-04-04 PROCEDURE — 99213 OFFICE O/P EST LOW 20 MIN: CPT | Performed by: FAMILY MEDICINE

## 2022-04-04 RX ORDER — IXEKIZUMAB 80 MG/ML
INJECTION, SOLUTION SUBCUTANEOUS
COMMUNITY
End: 2022-05-18

## 2022-04-04 RX ORDER — LIDOCAINE HYDROCHLORIDE 20 MG/ML
10 SOLUTION OROPHARYNGEAL 4 TIMES DAILY PRN
Qty: 100 ML | Refills: 2 | Status: SHIPPED | OUTPATIENT
Start: 2022-04-04 | End: 2022-05-18

## 2022-04-04 RX ORDER — AMOXICILLIN 500 MG/1
1000 CAPSULE ORAL EVERY 12 HOURS SCHEDULED
Qty: 40 CAPSULE | Refills: 0 | Status: SHIPPED | OUTPATIENT
Start: 2022-04-04 | End: 2022-04-14

## 2022-04-04 NOTE — PROGRESS NOTES
Assessment/Plan: patient use amoxicillin as well as lidocaine as directed  Follow-up as needed       Diagnoses and all orders for this visit:    Pharyngitis due to Streptococcus species  -     amoxicillin (AMOXIL) 500 mg capsule; Take 2 capsules (1,000 mg total) by mouth every 12 (twelve) hours for 10 days  -     Lidocaine Viscous HCl (XYLOCAINE) 2 % mucosal solution; Swish and spit 10 mL 4 (four) times a day as needed for mouth pain or discomfort    Other orders  -     Ixekizumab (Taltz) 80 MG/ML SOAJ; Inject under the skin            Subjective:        Patient ID: David Sinclair is a 28 y o  female  Patient is here with sore throat/ difficulty swelling over the past 2 weeks  No significant prove with prednisone taper  No fever  Mild postnasal drip noted  No significant cough or reflux symptoms  The following portions of the patient's history were reviewed and updated as appropriate: allergies, current medications, past family history, past medical history, past social history, past surgical history and problem list       Review of Systems   Constitutional: Negative  Negative for fever  HENT: Positive for postnasal drip and sore throat  Eyes: Negative  Respiratory: Negative  Cardiovascular: Negative  Gastrointestinal: Negative  Endocrine: Negative  Genitourinary: Negative  Musculoskeletal: Negative  Skin: Negative  Allergic/Immunologic: Negative  Neurological: Negative  Hematological: Negative  Psychiatric/Behavioral: Negative  Objective:               /80   Pulse 80   Ht 5' 3" (1 6 m)   Wt 118 kg (260 lb)   SpO2 98%   BMI 46 06 kg/m²          Physical Exam  Vitals and nursing note reviewed  Constitutional:       General: She is not in acute distress  Appearance: Normal appearance  She is not ill-appearing, toxic-appearing or diaphoretic  HENT:      Head: Normocephalic and atraumatic        Right Ear: Tympanic membrane, ear canal and external ear normal  There is no impacted cerumen  Left Ear: Tympanic membrane, ear canal and external ear normal  There is no impacted cerumen  Nose: Rhinorrhea present  No congestion  Mouth/Throat:      Mouth: Mucous membranes are moist       Pharynx: Oropharyngeal exudate and posterior oropharyngeal erythema present  Eyes:      General: No scleral icterus  Right eye: No discharge  Left eye: No discharge  Extraocular Movements: Extraocular movements intact  Conjunctiva/sclera: Conjunctivae normal       Pupils: Pupils are equal, round, and reactive to light  Neck:      Vascular: No carotid bruit  Cardiovascular:      Rate and Rhythm: Normal rate and regular rhythm  Pulses: Normal pulses  Heart sounds: Normal heart sounds  No murmur heard  No friction rub  No gallop  Pulmonary:      Effort: Pulmonary effort is normal  No respiratory distress  Breath sounds: Normal breath sounds  No stridor  No wheezing, rhonchi or rales  Chest:      Chest wall: No tenderness  Musculoskeletal:         General: Tenderness present  No swelling, deformity or signs of injury  Cervical back: Normal range of motion and neck supple  No rigidity  No muscular tenderness  Right lower leg: No edema  Left lower leg: No edema  Lymphadenopathy:      Cervical: No cervical adenopathy  Skin:     General: Skin is warm and dry  Capillary Refill: Capillary refill takes less than 2 seconds  Coloration: Skin is not jaundiced  Findings: No bruising, erythema, lesion or rash  Neurological:      Mental Status: She is alert and oriented to person, place, and time  Mental status is at baseline  Cranial Nerves: No cranial nerve deficit  Sensory: No sensory deficit  Motor: No weakness        Coordination: Coordination normal       Gait: Gait normal    Psychiatric:         Mood and Affect: Mood normal          Behavior: Behavior normal  Thought Content:  Thought content normal          Judgment: Judgment normal

## 2022-04-08 ENCOUNTER — TELEMEDICINE (OUTPATIENT)
Dept: BEHAVIORAL/MENTAL HEALTH CLINIC | Facility: CLINIC | Age: 32
End: 2022-04-08
Payer: COMMERCIAL

## 2022-04-08 DIAGNOSIS — F41.1 GENERALIZED ANXIETY DISORDER: Chronic | ICD-10-CM

## 2022-04-08 DIAGNOSIS — F33.1 DEPRESSION, MAJOR, RECURRENT, MODERATE (HCC): Primary | Chronic | ICD-10-CM

## 2022-04-08 PROCEDURE — 90834 PSYTX W PT 45 MINUTES: CPT | Performed by: SOCIAL WORKER
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
## 2022-04-16 ENCOUNTER — HOSPITAL ENCOUNTER (EMERGENCY)
Facility: HOSPITAL | Age: 32
Discharge: HOME | End: 2022-04-17
Attending: INTERNAL MEDICINE | Admitting: INTERNAL MEDICINE
Payer: COMMERCIAL

## 2022-04-16 ENCOUNTER — APPOINTMENT (EMERGENCY)
Dept: RADIOLOGY | Facility: HOSPITAL | Age: 32
End: 2022-04-16
Payer: COMMERCIAL

## 2022-04-16 DIAGNOSIS — E86.0 DEHYDRATION: ICD-10-CM

## 2022-04-16 DIAGNOSIS — R19.7 DIARRHEA: Primary | ICD-10-CM

## 2022-04-16 LAB
ALBUMIN SERPL BCP-MCNC: 3.6 G/DL (ref 3.5–5)
ALP SERPL-CCNC: 54 U/L (ref 34–104)
ALT SERPL W P-5'-P-CCNC: 10 U/L (ref 7–52)
ANION GAP SERPL CALCULATED.3IONS-SCNC: 11 MMOL/L (ref 4–13)
APTT PPP: 28 SECONDS (ref 23–37)
AST SERPL W P-5'-P-CCNC: 12 U/L (ref 13–39)
BASOPHILS # BLD AUTO: 0.05 THOUSANDS/ΜL (ref 0–0.1)
BASOPHILS NFR BLD AUTO: 0 % (ref 0–1)
BILIRUB SERPL-MCNC: 0.4 MG/DL (ref 0.2–1)
BUN SERPL-MCNC: 6 MG/DL (ref 5–25)
CALCIUM SERPL-MCNC: 9.3 MG/DL (ref 8.4–10.2)
CHLORIDE SERPL-SCNC: 102 MMOL/L (ref 96–108)
CO2 SERPL-SCNC: 22 MMOL/L (ref 21–32)
CREAT SERPL-MCNC: 0.6 MG/DL (ref 0.6–1.3)
CRP SERPL QL: 24.5 MG/L
EOSINOPHIL # BLD AUTO: 0.29 THOUSAND/ΜL (ref 0–0.61)
EOSINOPHIL NFR BLD AUTO: 3 % (ref 0–6)
ERYTHROCYTE [DISTWIDTH] IN BLOOD BY AUTOMATED COUNT: 14.3 % (ref 11.6–15.1)
GFR SERPL CREATININE-BSD FRML MDRD: 122 ML/MIN/1.73SQ M
GLUCOSE SERPL-MCNC: 91 MG/DL (ref 65–140)
HCT VFR BLD AUTO: 35 % (ref 34.8–46.1)
HGB BLD-MCNC: 11.3 G/DL (ref 11.5–15.4)
IMM GRANULOCYTES # BLD AUTO: 0.03 THOUSAND/UL (ref 0–0.2)
IMM GRANULOCYTES NFR BLD AUTO: 0 % (ref 0–2)
INR PPP: 1.05 (ref 0.84–1.19)
LACTATE SERPL-SCNC: 2.1 MMOL/L (ref 0.5–2)
LYMPHOCYTES # BLD AUTO: 4.87 THOUSANDS/ΜL (ref 0.6–4.47)
LYMPHOCYTES NFR BLD AUTO: 42 % (ref 14–44)
MCH RBC QN AUTO: 28.2 PG (ref 26.8–34.3)
MCHC RBC AUTO-ENTMCNC: 32.3 G/DL (ref 31.4–37.4)
MCV RBC AUTO: 87 FL (ref 82–98)
MONOCYTES # BLD AUTO: 0.93 THOUSAND/ΜL (ref 0.17–1.22)
MONOCYTES NFR BLD AUTO: 8 % (ref 4–12)
NEUTROPHILS # BLD AUTO: 5.39 THOUSANDS/ΜL (ref 1.85–7.62)
NEUTS SEG NFR BLD AUTO: 47 % (ref 43–75)
NRBC BLD AUTO-RTO: 0 /100 WBCS
PLATELET # BLD AUTO: 415 THOUSANDS/UL (ref 149–390)
PMV BLD AUTO: 9 FL (ref 8.9–12.7)
POTASSIUM SERPL-SCNC: 3.3 MMOL/L (ref 3.5–5.3)
PROCALCITONIN SERPL-MCNC: <0.05 NG/ML
PROT SERPL-MCNC: 6.7 G/DL (ref 6.4–8.4)
PROTHROMBIN TIME: 13.6 SECONDS (ref 11.6–14.5)
RBC # BLD AUTO: 4.01 MILLION/UL (ref 3.81–5.12)
SODIUM SERPL-SCNC: 135 MMOL/L (ref 135–147)
WBC # BLD AUTO: 11.56 THOUSAND/UL (ref 4.31–10.16)

## 2022-04-16 PROCEDURE — 99284 EMERGENCY DEPT VISIT MOD MDM: CPT

## 2022-04-16 PROCEDURE — 36415 COLL VENOUS BLD VENIPUNCTURE: CPT | Performed by: INTERNAL MEDICINE

## 2022-04-16 PROCEDURE — 80053 COMPREHEN METABOLIC PANEL: CPT | Performed by: INTERNAL MEDICINE

## 2022-04-16 PROCEDURE — 85610 PROTHROMBIN TIME: CPT | Performed by: INTERNAL MEDICINE

## 2022-04-16 PROCEDURE — 87040 BLOOD CULTURE FOR BACTERIA: CPT | Performed by: INTERNAL MEDICINE

## 2022-04-16 PROCEDURE — 71045 X-RAY EXAM CHEST 1 VIEW: CPT

## 2022-04-16 PROCEDURE — 96360 HYDRATION IV INFUSION INIT: CPT

## 2022-04-16 PROCEDURE — 83605 ASSAY OF LACTIC ACID: CPT | Performed by: INTERNAL MEDICINE

## 2022-04-16 PROCEDURE — 93005 ELECTROCARDIOGRAM TRACING: CPT

## 2022-04-16 PROCEDURE — 86140 C-REACTIVE PROTEIN: CPT | Performed by: INTERNAL MEDICINE

## 2022-04-16 PROCEDURE — 85025 COMPLETE CBC W/AUTO DIFF WBC: CPT | Performed by: INTERNAL MEDICINE

## 2022-04-16 PROCEDURE — 99285 EMERGENCY DEPT VISIT HI MDM: CPT | Performed by: INTERNAL MEDICINE

## 2022-04-16 PROCEDURE — 84145 PROCALCITONIN (PCT): CPT | Performed by: INTERNAL MEDICINE

## 2022-04-16 PROCEDURE — 85730 THROMBOPLASTIN TIME PARTIAL: CPT | Performed by: INTERNAL MEDICINE

## 2022-04-16 PROCEDURE — 81025 URINE PREGNANCY TEST: CPT | Performed by: INTERNAL MEDICINE

## 2022-04-16 RX ADMIN — SODIUM CHLORIDE 1000 ML: 0.9 INJECTION, SOLUTION INTRAVENOUS at 23:18

## 2022-04-17 VITALS
WEIGHT: 260 LBS | SYSTOLIC BLOOD PRESSURE: 138 MMHG | DIASTOLIC BLOOD PRESSURE: 81 MMHG | BODY MASS INDEX: 46.07 KG/M2 | HEART RATE: 79 BPM | TEMPERATURE: 100.2 F | RESPIRATION RATE: 16 BRPM | OXYGEN SATURATION: 99 % | HEIGHT: 63 IN

## 2022-04-17 LAB
ATRIAL RATE: 85 BPM
BACTERIA UR QL AUTO: ABNORMAL /HPF
BILIRUB UR QL STRIP: NEGATIVE
CLARITY UR: CLEAR
COLOR UR: YELLOW
EXT PREG TEST URINE: NEGATIVE
EXT. CONTROL ED NAV: NORMAL
GLUCOSE UR STRIP-MCNC: NEGATIVE MG/DL
HGB UR QL STRIP.AUTO: NEGATIVE
KETONES UR STRIP-MCNC: NEGATIVE MG/DL
LACTATE SERPL-SCNC: 2 MMOL/L (ref 0.5–2)
LEUKOCYTE ESTERASE UR QL STRIP: ABNORMAL
NITRITE UR QL STRIP: NEGATIVE
NON-SQ EPI CELLS URNS QL MICRO: ABNORMAL /HPF
P AXIS: 91 DEGREES
PH UR STRIP.AUTO: 6.5 [PH]
PR INTERVAL: 152 MS
PROT UR STRIP-MCNC: NEGATIVE MG/DL
QRS AXIS: -13 DEGREES
QRSD INTERVAL: 86 MS
QT INTERVAL: 372 MS
QTC INTERVAL: 442 MS
RBC #/AREA URNS AUTO: ABNORMAL /HPF
SP GR UR STRIP.AUTO: 1.02 (ref 1–1.03)
T WAVE AXIS: -10 DEGREES
UROBILINOGEN UR QL STRIP.AUTO: 0.2 E.U./DL
VENTRICULAR RATE: 85 BPM
WBC #/AREA URNS AUTO: ABNORMAL /HPF

## 2022-04-17 PROCEDURE — 83605 ASSAY OF LACTIC ACID: CPT | Performed by: INTERNAL MEDICINE

## 2022-04-17 PROCEDURE — 93010 ELECTROCARDIOGRAM REPORT: CPT | Performed by: INTERNAL MEDICINE

## 2022-04-17 PROCEDURE — 36415 COLL VENOUS BLD VENIPUNCTURE: CPT | Performed by: INTERNAL MEDICINE

## 2022-04-17 PROCEDURE — 81001 URINALYSIS AUTO W/SCOPE: CPT | Performed by: INTERNAL MEDICINE

## 2022-04-17 NOTE — DISCHARGE INSTRUCTIONS
Discussed her biologic with your physician  Imodium AD as directed for diarrhea  Plenty of fluids  Follow-up with primary care as well  Monitor a home temperature is and return if temp is greater than 101  Return if you diarrhea worsens rate developed any new symptoms

## 2022-04-17 NOTE — ED PROVIDER NOTES
History  Chief Complaint   Patient presents with    Fever - 9 weeks to 74 years     pt started with symptoms since taking medication but symptoms became worse yesterday; started taking Taltz 2 weeks ago; had similar reaction to Enbrel    Generalized Body Aches    Vomiting    Diarrhea     Kehinde Mendoza is a 45-year-old female who has ankylosing spondylitis who is on her 3rd biologic  She has had difficulty with 1st 2, and today just feeling horrible  She thinks she has had fevers, and has a low-grade fever here today  She just feels poorly  Low-grade headache  Occasional flank pain  Some urinary discomfort  Just generally feeling poorly  Diarrhea has also been present  These symptoms have been present for 1 week  Prior to Admission Medications   Prescriptions Last Dose Informant Patient Reported? Taking? ALPRAZolam (XANAX) 0 5 mg tablet   No No   Sig: Take 1 tablet (0 5 mg total) by mouth 2 (two) times a day as needed for anxiety   ARIPiprazole (ABILIFY) 2 mg tablet   No No   Sig: Take 1 tablet (2 mg total) by mouth daily   Aimovig 140 MG/ML SOAJ   No No   Sig: Inject 140mg under the skin every 30 (thirty) days  B-D 3CC LUER-DEANN SYR 25GX1" 25G X 1" 3 ML MISC  Self Yes No   Sig:     Certolizumab Pegol (Cimzia Starter Kit) 6 X 200 MG/ML KIT   Yes No   Sig: Inject 1 application under the skin every 30 (thirty) days   Patient not taking: Reported on 3/20/2022    Ixekizumab (Taltz) 80 MG/ML SOAJ   Yes No   Sig: Inject under the skin   Lidocaine Viscous HCl (XYLOCAINE) 2 % mucosal solution   No No   Sig: Swish and spit 10 mL 4 (four) times a day as needed for mouth pain or discomfort   Syringe/Needle, Disp, (SYRINGE 3CC/46XF6-8/4") 27G X 1-1/4" 3 ML MISC  Self No No   Sig: Use for IM injection of ketorolac     buPROPion (WELLBUTRIN XL) 150 mg 24 hr tablet   No No   Sig: Take 1 tablet (150 mg total) by mouth daily   escitalopram (LEXAPRO) 20 mg tablet   No No   Sig: Take 1 tablet (20 mg total) by mouth daily   etonogestrel-ethinyl estradiol (NuvaRing) 0 12-0 015 MG/24HR vaginal ring   No No   Sig: Insert ring for 21 days then remove for one week  indomethacin (INDOCIN) 25 mg capsule   No No   Si tab BID prn severe headache with food  Hold toradol    ketorolac (TORADOL) 10 mg tablet   No No   Sig: Take 1 tablet (10 mg total) by mouth every 6 (six) hours as needed (migraine) Max 2-3 per week  melatonin 1 mg  Self No No   Si-2 tabs qhs prn insomnia   metFORMIN (GLUCOPHAGE-XR) 500 mg 24 hr tablet   No No   Sig: Take 2 tablets (1,000 mg total) by mouth daily with breakfast   naproxen (Naprosyn) 500 mg tablet   No No   Sig: Take 1 tablet (500 mg total) by mouth 2 (two) times a day with meals for 14 days   Patient taking differently: Take 500 mg by mouth if needed     olmesartan (BENICAR) 20 mg tablet   No No   Sig: Take 1 tablet (20 mg total) by mouth daily   onabotulinumtoxin A (BOTOX) 100 units  Self Yes No   Sig: Inject as directed   predniSONE 10 mg tablet   No No   Si pills daily for 2 days, 4 for 2 days, 3 for 2 days, 2 for 2 days, 1 for 2 days     Patient not taking: Reported on 2022    vitamin B-12 (VITAMIN B-12) 1,000 mcg tablet  Self Yes No   Sig: Take 1,000 mcg by mouth daily       Facility-Administered Medications Last Administration Doses Remaining   cyanocobalamin injection 1,000 mcg 8/3/2020  8:59 AM    cyanocobalamin injection 1,000 mcg 2020 11:46 AM    cyanocobalamin injection 1,000 mcg 2020 11:29 AM           Past Medical History:   Diagnosis Date    Abnormal Pap smear of cervix     Allergic     Anxiety     Arthritis     Depression     Diabetes mellitus ([facility] [address] )     Fibromyalgia, primary     Hypertension     IBS (irritable bowel syndrome)     Migraine     Obesity     Vitamin B12 deficiency        Past Surgical History:   Procedure Laterality Date    COLONOSCOPY N/A 2018    Procedure: COLONOSCOPY;  Surgeon: Carmen Mckenzie MD;  Location: [facility] GI LAB; Service: Gastroenterology    MI EXC SKIN BENIG >4 CM REMAINDR BODY N/A 7/1/2021    Procedure: EXCISION OF PILAR SCALP CYST X 2 AND RIGHT INNER GROIN NEVVUS;  Surgeon: Fidelia Callejas MD;  Location:  MAIN OR;  Service: Plastics    MI RECMPL WND SCALP,EXTR 2 6-7 5 CM N/A 7/1/2021    Procedure: CLOSURE WOUND SCALP X 2 AND RIGHT INNER GROIN;  Surgeon: Fidelia Callejas MD;  Location:  MAIN OR;  Service: Plastics    TONSILLECTOMY      WISDOM TOOTH EXTRACTION         Family History   Problem Relation Age of Onset    Rheum arthritis Mother     Psoriasis Mother     Other Mother     Hypertension Mother     Diabetes unspecified Mother     Sjogren's syndrome Mother     Alcohol abuse Mother     Drug abuse Mother     Anxiety disorder Father     Alcohol abuse Father     Lung cancer Maternal Grandfather     Cancer Other         bone    Diabetes Other     Other Other         High blood pressure    Depression Maternal Grandmother      I have reviewed and agree with the history as documented  E-Cigarette/Vaping    E-Cigarette Use Current Every Day User     Start Date 7/1/19     Comments 1 cartridge every 2 months       E-Cigarette/Vaping Substances    Nicotine No     THC Yes     CBD Yes     Flavoring No     Other No     Unknown No      Social History     Tobacco Use    Smoking status: Never Smoker    Smokeless tobacco: Never Used   Vaping Use    Vaping Use: Every day    Start date: 7/1/2019    Substances: THC, CBD   Substance Use Topics    Alcohol use: Yes     Comment: rarely    Drug use: Yes     Frequency: 2 0 times per week     Types: Marijuana     Comment: medical marijuana (vape)       Review of Systems   Constitutional: Positive for chills, fatigue and fever  HENT: Negative for rhinorrhea and sore throat  Eyes: Negative for visual disturbance  Respiratory: Negative for cough and shortness of breath  Cardiovascular: Negative for chest pain and leg swelling     Gastrointestinal: Positive for diarrhea  Negative for abdominal pain, nausea and vomiting  Genitourinary: Negative for dysuria  Musculoskeletal: Positive for back pain  Negative for myalgias  Generalized myalgias   Skin: Negative for rash  Neurological: Positive for light-headedness and headaches  Negative for dizziness  Psychiatric/Behavioral: Negative for confusion  All other systems reviewed and are negative  Physical Exam  Physical Exam  Vitals and nursing note reviewed  Constitutional:       General: She is not in acute distress  Appearance: She is well-developed  She is obese  She is not ill-appearing, toxic-appearing or diaphoretic  HENT:      Head: Normocephalic  Nose: Nose normal       Mouth/Throat:      Pharynx: No oropharyngeal exudate  Eyes:      General: No scleral icterus  Conjunctiva/sclera: Conjunctivae normal       Pupils: Pupils are equal, round, and reactive to light  Neck:      Vascular: No JVD  Trachea: No tracheal deviation  Cardiovascular:      Rate and Rhythm: Normal rate and regular rhythm  Heart sounds: Normal heart sounds  No murmur heard  Pulmonary:      Effort: Pulmonary effort is normal  No respiratory distress  Breath sounds: Normal breath sounds  No wheezing or rales  Abdominal:      General: Bowel sounds are normal       Palpations: Abdomen is soft  Tenderness: There is no abdominal tenderness  There is no guarding  Hernia: No hernia is present  Musculoskeletal:         General: No tenderness  Normal range of motion  Cervical back: Normal range of motion and neck supple  Skin:     General: Skin is warm and dry  Neurological:      General: No focal deficit present  Mental Status: She is alert and oriented to person, place, and time  Cranial Nerves: No cranial nerve deficit  Sensory: No sensory deficit  Motor: Weakness present  No abnormal muscle tone        Comments: 5/5 motor, nl sens Psychiatric:         Behavior: Behavior normal          Vital Signs  ED Triage Vitals [04/16/22 2149]   Temperature Pulse Respirations Blood Pressure SpO2   100 2 °F (37 9 °C) 95 18 (!) 170/103 97 %      Temp Source Heart Rate Source Patient Position - Orthostatic VS BP Location FiO2 (%)   Temporal Monitor Sitting Right arm --      Pain Score       8           Vitals:    04/17/22 0045 04/17/22 0100 04/17/22 0115 04/17/22 0130   BP: 128/71 128/73 142/80 138/81   Pulse: 79 79 80 79   Patient Position - Orthostatic VS:             Visual Acuity      ED Medications  Medications   sodium chloride 0 9 % bolus 1,000 mL (0 mL Intravenous Stopped 4/17/22 0044)     Followed by   sodium chloride 0 9 % bolus 1,000 mL (0 mL Intravenous Stopped 4/17/22 0044)       Diagnostic Studies  Results Reviewed     Procedure Component Value Units Date/Time    Lactic acid 2 Hours [[id number]]  (Normal) Collected: 04/17/22 0043    Lab Status: Final result Specimen: Blood from Arm, Left Updated: 04/17/22 0117     LACTIC ACID 2 0 mmol/L     Narrative:      Result may be elevated if tourniquet was used during collection      Urine Microscopic [[id number]]  (Abnormal) Collected: 04/17/22 0005    Lab Status: Final result Specimen: Urine, Clean Catch Updated: 04/17/22 0031     RBC, UA None Seen /hpf      WBC, UA 2-4 /hpf      Epithelial Cells Innumerable /hpf      Bacteria, UA Innumerable /hpf     UA w Reflex to Microscopic w Reflex to Culture [[id number]]  (Abnormal) Collected: 04/17/22 0005    Lab Status: Final result Specimen: Urine, Clean Catch Updated: 04/17/22 0017     Color, UA Yellow     Clarity, UA Clear     Specific Gravity, UA 1 025     pH, UA 6 5     Leukocytes, UA Trace     Nitrite, UA Negative     Protein, UA Negative mg/dl      Glucose, UA Negative mg/dl      Ketones, UA Negative mg/dl      Urobilinogen, UA 0 2 E U /dl      Bilirubin, UA Negative     Blood, UA Negative    POCT pregnancy, urine [[id number]]  (Normal) Resulted: 04/17/22 0006    Lab Status: Final result Updated: 04/17/22 0007     EXT PREG TEST UR (Ref: Negative) negative     Control valid   [id number]   9/30/2023    Lactic acid [[id number]]  (Abnormal) Collected: 04/16/22 2248    Lab Status: Final result Specimen: Blood from Arm, Left Updated: 04/16/22 2331     LACTIC ACID 2 1 mmol/L     Narrative:      Result may be elevated if tourniquet was used during collection      Procalcitonin [[id number]]  (Normal) Collected: 04/16/22 2248    Lab Status: Final result Specimen: Blood from Arm, Left Updated: 04/16/22 2326     Procalcitonin <0 05 ng/ml     Comprehensive metabolic panel [[id number]]  (Abnormal) Collected: 04/16/22 2248    Lab Status: Final result Specimen: Blood from Arm, Left Updated: 04/16/22 2319     Sodium 135 mmol/L      Potassium 3 3 mmol/L      Chloride 102 mmol/L      CO2 22 mmol/L      ANION GAP 11 mmol/L      BUN 6 mg/dL      Creatinine 0 60 mg/dL      Glucose 91 mg/dL      Calcium 9 3 mg/dL      AST 12 U/L      ALT 10 U/L      Alkaline Phosphatase 54 U/L      Total Protein 6 7 g/dL      Albumin 3 6 g/dL      Total Bilirubin 0 40 mg/dL      eGFR 122 ml/min/1 73sq m     Narrative:      [place] guidelines for Chronic Kidney Disease (CKD):     Stage 1 with normal or high GFR (GFR > 90 mL/min/1 73 square meters)    Stage 2 Mild CKD (GFR = 60-89 mL/min/1 73 square meters)    Stage 3A Moderate CKD (GFR = 45-59 mL/min/1 73 square meters)    Stage 3B Moderate CKD (GFR = 30-44 mL/min/1 73 square meters)    Stage 4 Severe CKD (GFR = 15-29 mL/min/1 73 square meters)    Stage 5 End Stage CKD (GFR <15 mL/min/1 73 square meters)  Note: GFR calculation is accurate only with a steady state creatinine    C-reactive protein [[id number]]  (Abnormal) Collected: 04/16/22 2248    Lab Status: Final result Specimen: Blood from Arm, Left Updated: 04/16/22 2319     CRP 24 5 mg/L     Narrative:      Note: Unit of Measure change to mg/L at [facility] will show at 10 fold increase in CRP result to match network standards  Protime-INR [[id number]]  (Normal) Collected: 04/16/22 2248    Lab Status: Final result Specimen: Blood from Arm, Left Updated: 04/16/22 2311     Protime 13 6 seconds      INR 1 05    APTT [[id number]]  (Normal) Collected: 04/16/22 2248    Lab Status: Final result Specimen: Blood from Arm, Left Updated: 04/16/22 2311     PTT 28 seconds     CBC and differential [[id number]]  (Abnormal) Collected: 04/16/22 2248    Lab Status: Final result Specimen: Blood from Arm, Left Updated: 04/16/22 2258     WBC 11 56 Thousand/uL      RBC 4 01 Million/uL      Hemoglobin 11 3 g/dL      Hematocrit 35 0 %      MCV 87 fL      MCH 28 2 pg      MCHC 32 3 g/dL      RDW 14 3 %      MPV 9 0 fL      Platelets 545 Thousands/uL      nRBC 0 /100 WBCs      Neutrophils Relative 47 %      Immat GRANS % 0 %      Lymphocytes Relative 42 %      Monocytes Relative 8 %      Eosinophils Relative 3 %      Basophils Relative 0 %      Neutrophils Absolute 5 39 Thousands/µL      Immature Grans Absolute 0 03 Thousand/uL      Lymphocytes Absolute 4 87 Thousands/µL      Monocytes Absolute 0 93 Thousand/µL      Eosinophils Absolute 0 29 Thousand/µL      Basophils Absolute 0 05 Thousands/µL     Blood culture #2 [[id number]] Collected: 04/16/22 2248    Lab Status: In process Specimen: Blood from Arm, Left Updated: 04/16/22 2255    Blood culture #1 [[id number]] Collected: 04/16/22 2241    Lab Status:  In process Specimen: Blood from Arm, Right Updated: 04/16/22 2255                 XR chest 1 view portable   ED Interpretation by Ivory Kaufman DO (04/16 2345)   No active disease in the chest                 Procedures  ECG 12 Lead Documentation Only    Date/Time: 4/16/2022 11:10 PM  Performed by: Ivory Kaufman DO  Authorized by: Ivory Kaufman DO     Indications / Diagnosis:  Possible sepsis  ECG reviewed by me, the ED Provider: yes    Patient location:  ED  Previous ECG:     Previous ECG: Compared to current  Interpretation:     Interpretation: normal    Rate:     ECG rate:  85    ECG rate assessment: normal    Rhythm:     Rhythm: sinus rhythm    Ectopy:     Ectopy: none    QRS:     QRS axis:  Normal    QRS intervals:  Normal  Conduction:     Conduction: normal    ST segments:     ST segments:  Normal  T waves:     T waves: normal    Other findings:     Other findings: poor R wave progression               ED Course  ED Course as of 04/17/22 0140   Sun Apr 17, 2022   0032 Bacteria, UA(!): Innumerable   0032 Epithelial Cells(!): Innumerable   0121 Patient feeling better after the IV fluids  Lactate decreased down 2 0  Overall I feel she was dehydrated related to the diarrhea which is prior being caused by her biologic  She is comfortable with returning home, to return only if symptoms worsen  She has follow-up with her doctor this week  She will monitor her home temperatures , take Imodium for diarrhea  Diarrhea persists she may need formal stool cultures  She is aware of this  SBIRT 22yo+      Most Recent Value   SBIRT (24 yo +)    In order to provide better care to our patients, we are screening all of our patients for alcohol and drug use  Would it be okay to ask you these screening questions? Yes Filed at: 04/16/2022 2319   Initial Alcohol Screen: US AUDIT-C     1  How often do you have a drink containing alcohol? 0 Filed at: 04/16/2022 2319   2  How many drinks containing alcohol do you have on a typical day you are drinking? 0 Filed at: 04/16/2022 2319   3a  Male UNDER 65: How often do you have five or more drinks on one occasion? 0 Filed at: 04/16/2022 2319   3b  FEMALE Any Age, or MALE 65+: How often do you have 4 or more drinks on one occassion? 0 Filed at: 04/16/2022 2319   Audit-C Score 0 Filed at: 04/16/2022 2319   BRUCE: How many times in the past year have you    Used an illegal drug or used a prescription medication for non-medical reasons?  Never Filed at: 04/16/2022 2319                    [facility]  Number of Diagnoses or Management Options  Dehydration  Diarrhea  Diagnosis management comments: Patient is suspected of being simply dehydrated  No source of sepsis could be found  Intravenous hydration given with improvement of patient's symptomatology  Blood pressure and heart rate remained stable  Has follow-up in near future with her primary care  Disposition  Final diagnoses:   Diarrhea   Dehydration     Time reflects when diagnosis was documented in both MDM as applicable and the Disposition within this note     Time User Action Codes Description Comment    4/17/2022  1:26 AM [place] Jayla Add [R19 7] Diarrhea     4/17/2022  1:26 AM [place] Jayla Add [E86 0] Dehydration       ED Disposition     ED Disposition Condition Date/Time Comment    Discharge Stable Sun Apr 17, 2022  1:26 AM Esteban Olsen discharge to home/self care  Follow-up Information     Follow up With Specialties Details Why Contact Daniele Corley DO Family Medicine Call  Call for follow-up appointment [address] [id number]  [place] [address]  [phone]            Current Discharge Medication List      CONTINUE these medications which have NOT CHANGED    Details   Aimovig 140 MG/ML SOAJ Inject 140mg under the skin every 30 (thirty) days  Qty: 1 mL, Refills: 10    Associated Diagnoses: Chronic migraine without aura without status migrainosus, not intractable      ALPRAZolam (XANAX) 0 5 mg tablet Take 1 tablet (0 5 mg total) by mouth 2 (two) times a day as needed for anxiety  Qty: 60 tablet, Refills: 2    Associated Diagnoses: Generalized anxiety disorder      ARIPiprazole (ABILIFY) 2 mg tablet Take 1 tablet (2 mg total) by mouth daily  Qty: 30 tablet, Refills: 2    Comments: Please disregard any refills left on prior Rxs  Associated Diagnoses: Fibromyalgia      ! ! B-D 3CC LUER-DEANN SYR 25GX1" 25G X 1" 3 ML MISC        buPROPion (WELLBUTRIN XL) 150 mg 24 hr tablet Take 1 tablet (150 mg total) by mouth daily  Qty: 30 tablet, Refills: 2    Comments: Please disregard any refills left on prior Rxs  Associated Diagnoses: Generalized anxiety disorder      Certolizumab Pegol (Cimzia Starter Kit) 6 X 200 MG/ML KIT Inject 1 application under the skin every 30 (thirty) days      escitalopram (LEXAPRO) 20 mg tablet Take 1 tablet (20 mg total) by mouth daily  Qty: 30 tablet, Refills: 2    Associated Diagnoses: Generalized anxiety disorder      etonogestrel-ethinyl estradiol (NuvaRing) 0 12-0 015 MG/24HR vaginal ring Insert ring for 21 days then remove for one week  Qty: 3 each, Refills: 3    Comments: Please give 1 box of 3  Associated Diagnoses: Encounter for surveillance of vaginal ring hormonal contraceptive device      indomethacin (INDOCIN) 25 mg capsule 1 tab BID prn severe headache with food  Hold toradol  Qty: 30 capsule, Refills: 0    Associated Diagnoses: Chronic migraine without aura without status migrainosus, not intractable      Ixekizumab (Taltz) 80 MG/ML SOAJ Inject under the skin      ketorolac (TORADOL) 10 mg tablet Take 1 tablet (10 mg total) by mouth every 6 (six) hours as needed (migraine) Max 2-3 per week  Qty: 10 tablet, Refills: 0    Associated Diagnoses: Chronic migraine without aura without status migrainosus, not intractable      Lidocaine Viscous HCl (XYLOCAINE) 2 % mucosal solution Swish and spit 10 mL 4 (four) times a day as needed for mouth pain or discomfort  Qty: 100 mL, Refills: 2    Associated Diagnoses: Pharyngitis due to Streptococcus species      melatonin 1 mg 1-2 tabs qhs prn insomnia  Qty: 60 tablet, Refills: 2    Associated Diagnoses: Chronic migraine without aura without status migrainosus, not intractable; Insomnia due to medical condition      metFORMIN (GLUCOPHAGE-XR) 500 mg 24 hr tablet Take 2 tablets (1,000 mg total) by mouth daily with breakfast  Qty: 30 tablet, Refills: 2    Associated Diagnoses:  Morbid obesity with BMI of 40 0-44 9, adult ([facility] [address] ); Hyperinsulinemia      naproxen (Naprosyn) 500 mg tablet Take 1 tablet (500 mg total) by mouth 2 (two) times a day with meals for 14 days  Qty: 28 tablet, Refills: 0    Associated Diagnoses: TMJ (temporomandibular joint disorder)      olmesartan (BENICAR) 20 mg tablet Take 1 tablet (20 mg total) by mouth daily  Qty: 30 tablet, Refills: 1    Comments: Refill [id number]  Associated Diagnoses: Primary hypertension      onabotulinumtoxin A (BOTOX) 100 units Inject as directed      predniSONE 10 mg tablet 5 pills daily for 2 days, 4 for 2 days, 3 for 2 days, 2 for 2 days, 1 for 2 days  Qty: 30 tablet, Refills: 0    Associated Diagnoses: Inflammatory spondylopathy, unspecified spinal region [facility])      ! ! Syringe/Needle, Disp, (SYRINGE 3CC/02LB2-6/4") 27G X 1-1/4" 3 ML MISC Use for IM injection of ketorolac  Qty: 4 each, Refills: 0    Associated Diagnoses: Chronic migraine without aura without status migrainosus, not intractable      vitamin B-12 (VITAMIN B-12) 1,000 mcg tablet Take 1,000 mcg by mouth daily        ! ! - Potential duplicate medications found  Please discuss with provider  No discharge procedures on file      PDMP Review       Value Time User    PDMP Reviewed  Yes 3/15/2022  2:33 PM Fernanda Bruner MD          ED Provider  Electronically Signed by           Diamond Richard DO  04/17/22 0140

## 2022-04-18 ENCOUNTER — APPOINTMENT (EMERGENCY)
Dept: CT IMAGING | Facility: HOSPITAL | Age: 32
End: 2022-04-18
Payer: COMMERCIAL

## 2022-04-18 ENCOUNTER — HOSPITAL ENCOUNTER (EMERGENCY)
Facility: HOSPITAL | Age: 32
Discharge: HOME | End: 2022-04-19
Attending: INTERNAL MEDICINE
Payer: COMMERCIAL

## 2022-04-18 DIAGNOSIS — R11.2 NAUSEA AND VOMITING: Primary | ICD-10-CM

## 2022-04-18 DIAGNOSIS — R10.32 LEFT LOWER QUADRANT ABDOMINAL PAIN: ICD-10-CM

## 2022-04-18 LAB
ALBUMIN SERPL BCP-MCNC: 3.5 G/DL (ref 3.5–5)
ALP SERPL-CCNC: 52 U/L (ref 34–104)
ALT SERPL W P-5'-P-CCNC: 11 U/L (ref 7–52)
ANION GAP SERPL CALCULATED.3IONS-SCNC: 10 MMOL/L (ref 4–13)
AST SERPL W P-5'-P-CCNC: 21 U/L (ref 13–39)
B-HCG SERPL-ACNC: <1 MIU/ML (ref 0–11.6)
BASOPHILS # BLD AUTO: 0.04 THOUSANDS/ΜL (ref 0–0.1)
BASOPHILS NFR BLD AUTO: 0 % (ref 0–1)
BILIRUB SERPL-MCNC: 0.42 MG/DL (ref 0.2–1)
BUN SERPL-MCNC: 5 MG/DL (ref 5–25)
CALCIUM SERPL-MCNC: 9.1 MG/DL (ref 8.4–10.2)
CHLORIDE SERPL-SCNC: 101 MMOL/L (ref 96–108)
CO2 SERPL-SCNC: 20 MMOL/L (ref 21–32)
CREAT SERPL-MCNC: 0.59 MG/DL (ref 0.6–1.3)
EOSINOPHIL # BLD AUTO: 0.29 THOUSAND/ΜL (ref 0–0.61)
EOSINOPHIL NFR BLD AUTO: 3 % (ref 0–6)
ERYTHROCYTE [DISTWIDTH] IN BLOOD BY AUTOMATED COUNT: 14.5 % (ref 11.6–15.1)
GFR SERPL CREATININE-BSD FRML MDRD: 123 ML/MIN/1.73SQ M
GLUCOSE SERPL-MCNC: 92 MG/DL (ref 65–140)
HCT VFR BLD AUTO: 34.5 % (ref 34.8–46.1)
HGB BLD-MCNC: 11.2 G/DL (ref 11.5–15.4)
IMM GRANULOCYTES # BLD AUTO: 0.02 THOUSAND/UL (ref 0–0.2)
IMM GRANULOCYTES NFR BLD AUTO: 0 % (ref 0–2)
LACTATE SERPL-SCNC: 1.5 MMOL/L (ref 0.5–2)
LIPASE SERPL-CCNC: 16 U/L (ref 11–82)
LYMPHOCYTES # BLD AUTO: 3.15 THOUSANDS/ΜL (ref 0.6–4.47)
LYMPHOCYTES NFR BLD AUTO: 31 % (ref 14–44)
MCH RBC QN AUTO: 28.4 PG (ref 26.8–34.3)
MCHC RBC AUTO-ENTMCNC: 32.5 G/DL (ref 31.4–37.4)
MCV RBC AUTO: 88 FL (ref 82–98)
MONOCYTES # BLD AUTO: 0.82 THOUSAND/ΜL (ref 0.17–1.22)
MONOCYTES NFR BLD AUTO: 8 % (ref 4–12)
NEUTROPHILS # BLD AUTO: 5.75 THOUSANDS/ΜL (ref 1.85–7.62)
NEUTS SEG NFR BLD AUTO: 58 % (ref 43–75)
NRBC BLD AUTO-RTO: 0 /100 WBCS
PLATELET # BLD AUTO: 407 THOUSANDS/UL (ref 149–390)
PMV BLD AUTO: 8.9 FL (ref 8.9–12.7)
POTASSIUM SERPL-SCNC: 4 MMOL/L (ref 3.5–5.3)
PROT SERPL-MCNC: 6.3 G/DL (ref 6.4–8.4)
RBC # BLD AUTO: 3.94 MILLION/UL (ref 3.81–5.12)
SODIUM SERPL-SCNC: 131 MMOL/L (ref 135–147)
WBC # BLD AUTO: 10.07 THOUSAND/UL (ref 4.31–10.16)

## 2022-04-18 PROCEDURE — 84702 CHORIONIC GONADOTROPIN TEST: CPT | Performed by: INTERNAL MEDICINE

## 2022-04-18 PROCEDURE — 80053 COMPREHEN METABOLIC PANEL: CPT | Performed by: INTERNAL MEDICINE

## 2022-04-18 PROCEDURE — 99284 EMERGENCY DEPT VISIT MOD MDM: CPT | Performed by: INTERNAL MEDICINE

## 2022-04-18 PROCEDURE — 83605 ASSAY OF LACTIC ACID: CPT | Performed by: INTERNAL MEDICINE

## 2022-04-18 PROCEDURE — G1004 CDSM NDSC: HCPCS

## 2022-04-18 PROCEDURE — 96374 THER/PROPH/DIAG INJ IV PUSH: CPT

## 2022-04-18 PROCEDURE — 74177 CT ABD & PELVIS W/CONTRAST: CPT

## 2022-04-18 PROCEDURE — 96361 HYDRATE IV INFUSION ADD-ON: CPT

## 2022-04-18 PROCEDURE — 85025 COMPLETE CBC W/AUTO DIFF WBC: CPT | Performed by: INTERNAL MEDICINE

## 2022-04-18 PROCEDURE — 36415 COLL VENOUS BLD VENIPUNCTURE: CPT | Performed by: INTERNAL MEDICINE

## 2022-04-18 PROCEDURE — 83690 ASSAY OF LIPASE: CPT | Performed by: INTERNAL MEDICINE

## 2022-04-18 PROCEDURE — 99284 EMERGENCY DEPT VISIT MOD MDM: CPT

## 2022-04-18 RX ORDER — ONDANSETRON 2 MG/ML
4 INJECTION INTRAMUSCULAR; INTRAVENOUS ONCE
Status: COMPLETED | OUTPATIENT
Start: 2022-04-18 | End: 2022-04-18

## 2022-04-18 RX ORDER — SODIUM CHLORIDE 9 MG/ML
1000 INJECTION, SOLUTION INTRAVENOUS CONTINUOUS
Status: DISCONTINUED | OUTPATIENT
Start: 2022-04-18 | End: 2022-04-19 | Stop reason: HOSPADM

## 2022-04-18 RX ADMIN — ONDANSETRON 4 MG: 2 INJECTION INTRAMUSCULAR; INTRAVENOUS at 22:19

## 2022-04-18 RX ADMIN — SODIUM CHLORIDE 1000 ML/HR: 0.9 INJECTION, SOLUTION INTRAVENOUS at 22:17

## 2022-04-18 RX ADMIN — IOHEXOL 100 ML: 350 INJECTION, SOLUTION INTRAVENOUS at 23:48

## 2022-04-19 ENCOUNTER — TELEPHONE (OUTPATIENT)
Dept: NEUROLOGY | Facility: CLINIC | Age: 32
End: 2022-04-19

## 2022-04-19 VITALS
HEIGHT: 63 IN | BODY MASS INDEX: 46.07 KG/M2 | DIASTOLIC BLOOD PRESSURE: 98 MMHG | RESPIRATION RATE: 16 BRPM | TEMPERATURE: 100.2 F | OXYGEN SATURATION: 97 % | HEART RATE: 95 BPM | SYSTOLIC BLOOD PRESSURE: 151 MMHG | WEIGHT: 260 LBS

## 2022-04-19 LAB
BACTERIA UR QL AUTO: ABNORMAL /HPF
BILIRUB UR QL STRIP: NEGATIVE
CLARITY UR: ABNORMAL
COLOR UR: ABNORMAL
GLUCOSE UR STRIP-MCNC: NEGATIVE MG/DL
HGB UR QL STRIP.AUTO: ABNORMAL
KETONES UR STRIP-MCNC: NEGATIVE MG/DL
LEUKOCYTE ESTERASE UR QL STRIP: NEGATIVE
MUCOUS THREADS UR QL AUTO: ABNORMAL
NITRITE UR QL STRIP: NEGATIVE
NON-SQ EPI CELLS URNS QL MICRO: ABNORMAL /HPF
PH UR STRIP.AUTO: 7 [PH]
PROT UR STRIP-MCNC: ABNORMAL MG/DL
RBC #/AREA URNS AUTO: ABNORMAL /HPF
SP GR UR STRIP.AUTO: <=1.005 (ref 1–1.03)
UROBILINOGEN UR QL STRIP.AUTO: 0.2 E.U./DL
WBC #/AREA URNS AUTO: ABNORMAL /HPF

## 2022-04-19 PROCEDURE — 96361 HYDRATE IV INFUSION ADD-ON: CPT

## 2022-04-19 PROCEDURE — 96376 TX/PRO/DX INJ SAME DRUG ADON: CPT

## 2022-04-19 PROCEDURE — 81001 URINALYSIS AUTO W/SCOPE: CPT | Performed by: INTERNAL MEDICINE

## 2022-04-19 RX ORDER — ONDANSETRON 4 MG/1
4 TABLET, FILM COATED ORAL EVERY 6 HOURS
Qty: 12 TABLET | Refills: 0 | Status: SHIPPED | OUTPATIENT
Start: 2022-04-19 | End: 2022-05-27

## 2022-04-19 RX ORDER — ONDANSETRON 2 MG/ML
4 INJECTION INTRAMUSCULAR; INTRAVENOUS ONCE
Status: COMPLETED | OUTPATIENT
Start: 2022-04-19 | End: 2022-04-19

## 2022-04-19 RX ADMIN — ONDANSETRON 4 MG: 2 INJECTION INTRAMUSCULAR; INTRAVENOUS at 00:57

## 2022-04-19 NOTE — ED PROVIDER NOTES
History  Chief Complaint   Patient presents with    Vomiting     diarrhea; fevers; hasn't stopped since seen 3 days ago; states unable to keep anything down     80-year-old female who was discharged from the emergency room on April 16th presents with nausea vomiting and abdominal pain which has been present for several days  Patient states she vomited 2 times today, she vomited 2-3 times yesterday  Patient states she did not check her temperature but felt hot  Patient has diffuse abdominal pain she denies diarrhea constipation blood per rectum black tarry stool or hematemesis  The patient feels that this is a side effect of the new medication she is taking for her ankles and spondylitis  Prior to Admission Medications   Prescriptions Last Dose Informant Patient Reported? Taking? ALPRAZolam (XANAX) 0 5 mg tablet Past Week at Unknown time  No Yes   Sig: Take 1 tablet (0 5 mg total) by mouth 2 (two) times a day as needed for anxiety   ARIPiprazole (ABILIFY) 2 mg tablet 4/17/2022 at Unknown time  No Yes   Sig: Take 1 tablet (2 mg total) by mouth daily   Aimovig 140 MG/ML SOAJ Past Month at Unknown time  No Yes   Sig: Inject 140mg under the skin every 30 (thirty) days     B-D 3CC LUER-DEANN SYR 25GX1" 25G X 1" 3 ML MISC  Self Yes No   Sig:     Certolizumab Pegol (Cimzia Starter Kit) 6 X 200 MG/ML KIT Not Taking at Unknown time  Yes No   Sig: Inject 1 application under the skin every 30 (thirty) days   Patient not taking: Reported on 3/20/2022    Ixekizumab (Beatris Le) 80 MG/ML SOAJ Past Month at Unknown time  Yes Yes   Sig: Inject under the skin   Lidocaine Viscous HCl (XYLOCAINE) 2 % mucosal solution Not Taking at Unknown time  No No   Sig: Swish and spit 10 mL 4 (four) times a day as needed for mouth pain or discomfort   Patient not taking: Reported on 4/18/2022    Syringe/Needle, Disp, (SYRINGE 3CC/02AO6-4/4") 27G X 1-1/4" 3 ML MISC More than a month at Unknown time Self No No   Sig: Use for IM injection of ketorolac  buPROPion (WELLBUTRIN XL) 150 mg 24 hr tablet 2022 at Unknown time  No Yes   Sig: Take 1 tablet (150 mg total) by mouth daily   escitalopram (LEXAPRO) 20 mg tablet 2022 at Unknown time  No Yes   Sig: Take 1 tablet (20 mg total) by mouth daily   etonogestrel-ethinyl estradiol (NuvaRing) 0 12-0 015 MG/24HR vaginal ring Unknown at Unknown time  No No   Sig: Insert ring for 21 days then remove for one week  indomethacin (INDOCIN) 25 mg capsule Unknown at Unknown time  No No   Si tab BID prn severe headache with food  Hold toradol    ketorolac (TORADOL) 10 mg tablet More than a month at Unknown time  No No   Sig: Take 1 tablet (10 mg total) by mouth every 6 (six) hours as needed (migraine) Max 2-3 per week  melatonin 1 mg Not Taking at Unknown time Self No No   Si-2 tabs qhs prn insomnia   Patient not taking: Reported on 2022    metFORMIN (GLUCOPHAGE-XR) 500 mg 24 hr tablet 2022 at Unknown time  No Yes   Sig: Take 2 tablets (1,000 mg total) by mouth daily with breakfast   naproxen (Naprosyn) 500 mg tablet   No No   Sig: Take 1 tablet (500 mg total) by mouth 2 (two) times a day with meals for 14 days   Patient taking differently: Take 500 mg by mouth if needed     olmesartan (BENICAR) 20 mg tablet 2022 at Unknown time  No Yes   Sig: Take 1 tablet (20 mg total) by mouth daily   onabotulinumtoxin A (BOTOX) 100 units More than a month at Unknown time Self Yes No   Sig: Inject as directed   predniSONE 10 mg tablet Not Taking at Unknown time  No No   Si pills daily for 2 days, 4 for 2 days, 3 for 2 days, 2 for 2 days, 1 for 2 days     Patient not taking: Reported on 2022    vitamin B-12 (VITAMIN B-12) 1,000 mcg tablet Not Taking at Unknown time Self Yes No   Sig: Take 1,000 mcg by mouth daily    Patient not taking: Reported on 2022       Facility-Administered Medications Last Administration Doses Remaining   cyanocobalamin injection 1,000 mcg 8/3/2020  8:59 AM cyanocobalamin injection 1,000 mcg 5/18/2020 11:46 AM    cyanocobalamin injection 1,000 mcg 9/1/2020 11:29 AM           Past Medical History:   Diagnosis Date    Abnormal Pap smear of cervix     Allergic     Anxiety     Arthritis     Depression     Diabetes mellitus (HCC)     Fibromyalgia, primary     Hypertension     IBS (irritable bowel syndrome)     Migraine     Obesity     Vitamin B12 deficiency        Past Surgical History:   Procedure Laterality Date    COLONOSCOPY N/A 5/8/2018    Procedure: COLONOSCOPY;  Surgeon: Alvarado Hankins MD;  Location: [facility]; Service: Gastroenterology    MO EXC SKIN BENIG >4 CM REMAINDR BODY N/A 7/1/2021    Procedure: EXCISION OF PILAR SCALP CYST X 2 AND RIGHT INNER GROIN NEVVUS;  Surgeon: Abimbola Moreno MD;  Location:  MAIN OR;  Service: Plastics    MO RECMPL WND SCALP,EXTR 2 6-7 5 CM N/A 7/1/2021    Procedure: CLOSURE WOUND SCALP X 2 AND RIGHT INNER GROIN;  Surgeon: Abimbola Moreno MD;  Location:  MAIN OR;  Service: Plastics    TONSILLECTOMY      WISDOM TOOTH EXTRACTION         Family History   Problem Relation Age of Onset    Rheum arthritis Mother     Psoriasis Mother     Other Mother     Hypertension Mother     Diabetes unspecified Mother     Sjogren's syndrome Mother     Alcohol abuse Mother     Drug abuse Mother     Anxiety disorder Father     Alcohol abuse Father     Lung cancer Maternal Grandfather     Cancer Other         bone    Diabetes Other     Other Other         High blood pressure    Depression Maternal Grandmother      I have reviewed and agree with the history as documented      E-Cigarette/Vaping    E-Cigarette Use Current Every Day User     Start Date 7/1/19     Comments 1 cartridge every 2 months       E-Cigarette/Vaping Substances    Nicotine No     THC Yes     CBD Yes     Flavoring No     Other No     Unknown No      Social History     Tobacco Use    Smoking status: Never Smoker    Smokeless tobacco: Never Used   Vaping Use    Vaping Use: Every day    Start date: 7/1/2019    Substances: THC, CBD   Substance Use Topics    Alcohol use: Yes     Comment: rarely    Drug use: Yes     Frequency: 2 0 times per week     Types: Marijuana     Comment: medical marijuana (vape)       Review of Systems   Constitutional: Negative  HENT: Negative  Respiratory: Negative  Cardiovascular: Negative  Gastrointestinal: Positive for abdominal pain, nausea and vomiting  Negative for abdominal distention, anal bleeding, blood in stool, constipation and diarrhea  Genitourinary: Negative  Musculoskeletal: Negative  Skin: Negative  Neurological: Negative  Hematological: Negative  Psychiatric/Behavioral: Negative  Physical Exam  Physical Exam  Vitals and nursing note reviewed  Constitutional:       General: She is not in acute distress  Appearance: She is obese  She is not ill-appearing, toxic-appearing or diaphoretic  HENT:      Head: Normocephalic and atraumatic  Nose: Nose normal       Mouth/Throat:      Mouth: Mucous membranes are dry  Eyes:      Extraocular Movements: Extraocular movements intact  Conjunctiva/sclera: Conjunctivae normal    Cardiovascular:      Rate and Rhythm: Normal rate and regular rhythm  Pulses: Normal pulses  Heart sounds: Normal heart sounds  Pulmonary:      Effort: Pulmonary effort is normal       Breath sounds: Normal breath sounds  Abdominal:      General: Abdomen is flat  There is no distension  Palpations: There is no mass  Tenderness: There is abdominal tenderness  There is no right CVA tenderness, left CVA tenderness, guarding or rebound  Comments: On examination the patient abdomen is nondistended there are bowel sounds all 4 quadrants soft however she is tender in the left lower quadrant as well as the right upper quadrant and midepigastric area  Patient is most tender in the left lower quadrant    She has no rebound tenderness or guarding appreciated  Musculoskeletal:      Cervical back: Normal range of motion and neck supple  Neurological:      Mental Status: She is alert  Vital Signs  ED Triage Vitals [04/18/22 2113]   Temperature Pulse Respirations Blood Pressure SpO2   100 2 °F (37 9 °C) (!) 122 16 151/98 97 %      Temp Source Heart Rate Source Patient Position - Orthostatic VS BP Location FiO2 (%)   Tympanic Monitor Sitting Right arm --      Pain Score       7           Vitals:    04/18/22 2113   BP: 151/98   Pulse: (!) 122   Patient Position - Orthostatic VS: Sitting         Visual Acuity      ED Medications  Medications   sodium chloride 0 9 % infusion (1,000 mL/hr Intravenous New Bag 4/18/22 2217)   ondansetron (ZOFRAN) injection 4 mg (has no administration in time range)   ondansetron (ZOFRAN) injection 4 mg (4 mg Intravenous Given 4/18/22 2219)   iohexol (OMNIPAQUE) 350 MG/ML injection (SINGLE-DOSE) 100 mL (100 mL Intravenous Given 4/18/22 2348)       Diagnostic Studies  Results Reviewed     Procedure Component Value Units Date/Time    UA w Reflex to Microscopic w Reflex to Culture [[id number]]  (Abnormal) Collected: 04/19/22 0014    Lab Status: Final result Specimen: Urine, Clean Catch Updated: 04/19/22 0046     Color, UA Straw     Clarity, UA Slightly Cloudy     Specific Gravity, UA <=1 005     pH, UA 7 0     Leukocytes, UA Negative     Nitrite, UA Negative     Protein, UA Trace mg/dl      Glucose, UA Negative mg/dl      Ketones, UA Negative mg/dl      Urobilinogen, UA 0 2 E U /dl      Bilirubin, UA Negative     Blood, UA Trace-Intact    Urine Microscopic [[id number]] Collected: 04/19/22 0014    Lab Status:  In process Specimen: Urine, Clean Catch Updated: 04/19/22 0045    Lactic acid [[id number]]  (Normal) Collected: 04/18/22 2305    Lab Status: Final result Specimen: Blood from Hand, Left Updated: 04/18/22 2335     LACTIC ACID 1 5 mmol/L     Narrative:      Result may be elevated if tourniquet was used during collection      Pregnancy, hCG, quantitative [[id number]]  (Normal) Collected: 04/18/22 2216    Lab Status: Final result Specimen: Blood from Arm, Right Updated: 04/18/22 2318     HCG, Quant <1 mIU/mL     Narrative:       Expected Ranges:     Approximate               Approximate HCG  Gestation age          Concentration ( mIU/mL)  _____________          ______________________   Xiang Nassar                      HCG values  0 2-1                       5-50  1-2                           2-3                         100-5000  3-4                         500-86032  4-5                         1000-24667  5-6                         [id number]  6-8                         [id number]  8-12                        [id number]      Comprehensive metabolic panel [[id number]]  (Abnormal) Collected: 04/18/22 2216    Lab Status: Final result Specimen: Blood from Arm, Right Updated: 04/18/22 2316     Sodium 131 mmol/L      Potassium 4 0 mmol/L      Chloride 101 mmol/L      CO2 20 mmol/L      ANION GAP 10 mmol/L      BUN 5 mg/dL      Creatinine 0 59 mg/dL      Glucose 92 mg/dL      Calcium 9 1 mg/dL      AST 21 U/L      ALT 11 U/L      Alkaline Phosphatase 52 U/L      Total Protein 6 3 g/dL      Albumin 3 5 g/dL      Total Bilirubin 0 42 mg/dL      eGFR 123 ml/min/1 73sq m     Narrative:      James guidelines for Chronic Kidney Disease (CKD):     Stage 1 with normal or high GFR (GFR > 90 mL/min/1 73 square meters)    Stage 2 Mild CKD (GFR = 60-89 mL/min/1 73 square meters)    Stage 3A Moderate CKD (GFR = 45-59 mL/min/1 73 square meters)    Stage 3B Moderate CKD (GFR = 30-44 mL/min/1 73 square meters)    Stage 4 Severe CKD (GFR = 15-29 mL/min/1 73 square meters)    Stage 5 End Stage CKD (GFR <15 mL/min/1 73 square meters)  Note: GFR calculation is accurate only with a steady state creatinine    Lipase [[id number]]  (Normal) Collected: 04/18/22 2216    Lab Status: Final result Specimen: Blood from Arm, Right Updated: 04/18/22 2316     Lipase 16 u/L     CBC and differential [[id number]]  (Abnormal) Collected: 04/18/22 2216    Lab Status: Final result Specimen: Blood from Arm, Right Updated: 04/18/22 2230     WBC 10 07 Thousand/uL      RBC 3 94 Million/uL      Hemoglobin 11 2 g/dL      Hematocrit 34 5 %      MCV 88 fL      MCH 28 4 pg      MCHC 32 5 g/dL      RDW 14 5 %      MPV 8 9 fL      Platelets 442 Thousands/uL      nRBC 0 /100 WBCs      Neutrophils Relative 58 %      Immat GRANS % 0 %      Lymphocytes Relative 31 %      Monocytes Relative 8 %      Eosinophils Relative 3 %      Basophils Relative 0 %      Neutrophils Absolute 5 75 Thousands/µL      Immature Grans Absolute 0 02 Thousand/uL      Lymphocytes Absolute 3 15 Thousands/µL      Monocytes Absolute 0 82 Thousand/µL      Eosinophils Absolute 0 29 Thousand/µL      Basophils Absolute 0 04 Thousands/µL                  CT abdomen pelvis with contrast   Final Result by Trinity Christie MD (04/19 0011)      No evidence of acute intra-abdominal or pelvic pathology            Workstation performed: [id number]                    Procedures  Procedures         ED Course  ED Course as of 04/19/22 0054   Tue Apr 19, 2022   0027 UA w Reflex to Microscopic w Reflex to Culture   0041 UA w Reflex to Microscopic w Reflex to Culture                                             MDM  Number of Diagnoses or Management Options  Left lower quadrant abdominal pain: established and improving  Nausea and vomiting: established and improving  Diagnosis management comments: Patient presented with and nausea vomiting for several days as well as some diffuse abdominal pain most intense in the left lower quadrant  Lab studies as well as urine and CT scan were negative  Patient the time of discharge felt significantly better she has had no nausea or vomiting since being in the emergency room at this time        Disposition  Final diagnoses:   Nausea and vomiting Left lower quadrant abdominal pain     Time reflects when diagnosis was documented in both MDM as applicable and the Disposition within this note     Time User Action Codes Description Comment    4/19/2022 12:52 AM Hall Neither Add [R11 2] Nausea and vomiting     4/19/2022 12:53 AM Hall Neither Add [R10 32] Left lower quadrant abdominal pain       ED Disposition     ED Disposition Condition Date/Time Comment    Discharge Stable Tue Apr 19, 2022 12:52 AM José Miguel Carr discharge to home/self care  Follow-up Information     Follow up With Specialties Details Why Contact Info    Mg Sandoval, DO Family Medicine In 1 day  [address] [place] [address] Road  [phone]            Patient's Medications   Discharge Prescriptions    No medications on file       No discharge procedures on file      PDMP Review       Value Time User    PDMP Reviewed  Yes 3/15/2022  2:33 PM Sarah Hoyt MD          ED Provider  Electronically Signed by           Fabiana Deluna MD  04/19/22 0437

## 2022-04-21 ENCOUNTER — OFFICE VISIT (OUTPATIENT)
Dept: FAMILY MEDICINE CLINIC | Facility: CLINIC | Age: 32
End: 2022-04-21
Payer: COMMERCIAL

## 2022-04-21 VITALS
SYSTOLIC BLOOD PRESSURE: 152 MMHG | TEMPERATURE: 97.3 F | HEIGHT: 63 IN | BODY MASS INDEX: 45.43 KG/M2 | DIASTOLIC BLOOD PRESSURE: 92 MMHG | OXYGEN SATURATION: 99 % | HEART RATE: 92 BPM | WEIGHT: 256.4 LBS

## 2022-04-21 DIAGNOSIS — E16.1 HYPERINSULINEMIA: ICD-10-CM

## 2022-04-21 DIAGNOSIS — M46.90 INFLAMMATORY SPONDYLOPATHY, UNSPECIFIED SPINAL REGION (HCC): ICD-10-CM

## 2022-04-21 DIAGNOSIS — E66.01 MORBID OBESITY WITH BMI OF 40.0-44.9, ADULT (HCC): ICD-10-CM

## 2022-04-21 DIAGNOSIS — R11.2 NON-INTRACTABLE VOMITING WITH NAUSEA: Primary | ICD-10-CM

## 2022-04-21 PROCEDURE — 99214 OFFICE O/P EST MOD 30 MIN: CPT | Performed by: FAMILY MEDICINE

## 2022-04-21 RX ORDER — METFORMIN HYDROCHLORIDE 500 MG/1
1000 TABLET, EXTENDED RELEASE ORAL
Qty: 30 TABLET | Refills: 2 | OUTPATIENT
Start: 2022-04-21

## 2022-04-21 NOTE — PROGRESS NOTES
Assessment/Plan:  Here records reviewed as well as CT scans laboratory studies  Patient will continue with Zofran as needed  Patient follow-up in rheumatology appropriately to find new medications use for ankylosing spondylitis  Diagnoses and all orders for this visit:    Non-intractable vomiting with nausea    Inflammatory spondylopathy, unspecified spinal region [facility])            Subjective:        Patient ID: Theodore Callaway is a 28 y o  female  Patient is here to abdominal pain as well as intractable nausea  Patient was in the urine twice  Patient did have multiple laboratory studies well as CT scan of the abdomen pelvis done in reviewed at this time  Patient is feeling better overall with IV fluids well as Zofran  Patient is having some right flank pain  No fever  Patient did have some dry heaving the day  Patient does have some diarrhea  No problems with urination  Patient started taltz 4 weeks ago  Concerned that this may be the etiology to patient's symptoms  The following portions of the patient's history were reviewed and updated as appropriate: allergies, current medications, past family history, past medical history, past social history, past surgical history and problem list       Review of Systems   Constitutional: Negative  HENT: Negative  Eyes: Negative  Respiratory: Negative  Cardiovascular: Negative  Gastrointestinal: Positive for nausea and vomiting  Negative for abdominal pain  Endocrine: Negative  Genitourinary: Positive for flank pain  Skin: Negative  Allergic/Immunologic: Negative  Neurological: Negative  Hematological: Negative  Psychiatric/Behavioral: Negative              Objective:               /92 (BP Location: Right arm, Patient Position: Sitting, Cuff Size: Adult)   Pulse 92   Temp (!) 97 3 °F (36 3 °C) (Tympanic)   Ht 5' 3" (1 6 m)   Wt 116 kg (256 lb 6 4 oz)   LMP  (LMP Unknown)   SpO2 99%   BMI 45 42 kg/m² Physical Exam  Vitals and nursing note reviewed  Constitutional:       General: She is not in acute distress  Appearance: Normal appearance  She is not ill-appearing, toxic-appearing or diaphoretic  HENT:      Head: Normocephalic and atraumatic  Right Ear: Tympanic membrane, ear canal and external ear normal  There is no impacted cerumen  Left Ear: Tympanic membrane, ear canal and external ear normal  There is no impacted cerumen  Nose: Nose normal  No congestion or rhinorrhea  Eyes:      General: No scleral icterus  Right eye: No discharge  Left eye: No discharge  Extraocular Movements: Extraocular movements intact  Conjunctiva/sclera: Conjunctivae normal       Pupils: Pupils are equal, round, and reactive to light  Neck:      Vascular: No carotid bruit  Cardiovascular:      Rate and Rhythm: Normal rate and regular rhythm  Pulses: Normal pulses  Heart sounds: Normal heart sounds  No murmur heard  No friction rub  No gallop  Pulmonary:      Effort: Pulmonary effort is normal  No respiratory distress  Breath sounds: Normal breath sounds  No stridor  No wheezing, rhonchi or rales  Chest:      Chest wall: No tenderness  Abdominal:      General: Abdomen is flat  Bowel sounds are normal  There is no distension  Palpations: Abdomen is soft  Tenderness: There is no abdominal tenderness  There is no guarding or rebound  Musculoskeletal:         General: Tenderness present  No swelling, deformity or signs of injury  Cervical back: Normal range of motion and neck supple  No rigidity  No muscular tenderness  Right lower leg: No edema  Left lower leg: No edema  Lymphadenopathy:      Cervical: No cervical adenopathy  Skin:     General: Skin is warm and dry  Capillary Refill: Capillary refill takes less than 2 seconds  Coloration: Skin is not jaundiced        Findings: No bruising, erythema, lesion or rash    Neurological:      Mental Status: She is alert and oriented to person, place, and time  Mental status is at baseline  Cranial Nerves: No cranial nerve deficit  Sensory: No sensory deficit  Motor: No weakness  Coordination: Coordination normal       Gait: Gait normal    Psychiatric:         Mood and Affect: Mood normal          Behavior: Behavior normal          Thought Content:  Thought content normal          Judgment: Judgment normal

## 2022-04-22 ENCOUNTER — TELEMEDICINE (OUTPATIENT)
Dept: BEHAVIORAL/MENTAL HEALTH CLINIC | Facility: CLINIC | Age: 32
End: 2022-04-22
Payer: COMMERCIAL

## 2022-04-22 DIAGNOSIS — F41.1 GENERALIZED ANXIETY DISORDER: Chronic | ICD-10-CM

## 2022-04-22 DIAGNOSIS — F33.1 DEPRESSION, MAJOR, RECURRENT, MODERATE (HCC): Primary | Chronic | ICD-10-CM

## 2022-04-22 LAB
BACTERIA BLD CULT: NORMAL
BACTERIA BLD CULT: NORMAL

## 2022-04-22 PROCEDURE — 90834 PSYTX W PT 45 MINUTES: CPT | Performed by: SOCIAL WORKER

## 2022-04-22 NOTE — PSYCH
Virtual Regular Visit    Verification of patient location:    Patient is located in the following state in which I hold an active license [place]    Assessment/Plan:    Problem List Items Addressed This Visit        Other    Generalized anxiety disorder (Chronic)    Depression, major, recurrent, moderate (HCC) - Primary (Chronic)          Goals addressed in session: Goal 1          Reason for visit is No chief complaint on file  Encounter provider APARNA Casey    Provider located at [address] [place] [id number] [phone]      Recent Visits  Date Type Provider Dept   04/21/22 Office Visit Dm Woodard DO [facility] [address] recent visits within past 7 days and meeting all other requirements  Future Appointments  No visits were found meeting these conditions  Showing future appointments within next 150 days and meeting all other requirements       The patient was identified by name and date of birth  Santana Arrieta was informed that this is a telemedicine visit and that the visit is being conducted throughEpic Embedded and patient was informed this is a secure, HIPAA-complaint platform  She agrees to proceed     My office door was closed  No one else was in the room  She acknowledged consent and understanding of privacy and security of the video platform  The patient has agreed to participate and understands they can discontinue the visit at any time  Patient is aware this is a billable service  Malena Conway is a 28 y o  female  DATA: Met with Stephanie for scheduled individual session  Topics of discussion included relationships with family, physical health concerns and relationships with others (ex-SO, best friend, etc)  "I'm a mess  My new medicine made me super sick " Marisa Sierra states that she was at the emergency room two times for dehydration   She states that her blood pressure was high, she had a rapid pulse, abdominal pain, and chills  She states that the medical providers believe that the medication is the cause of all of her symptoms  She has an appointment scheduled to see her rheumatologist, and she plans to stop her medications for a while  She expressed sadness and frustration with her medical issues and her desire to be able to be more active  Karissa Blum discussed her relationship with her family members (especially her mother)  She states that her mother took her to the hospital but did not stay in the emergency room with her (due to their continued attempts to avoid covid exposure)  Karissa Blum states that she was home when her ex- came to  his belongings  She states that they are speaking with one another, and she feels that she is doing well with managing the relationship (as just friends)  She states that she is unsure what she wants to do regarding future romantic relationships  She states that she wants to focus on herself; however, she also is unsure if she wants to be single  Client shows evidence of utilizing some basic mindfulness skills to manage mental health symptoms  During this session, this clinician used the following therapeutic modalities: supportive psychotherapy, client-centered therapy, mindfulness-based strategies, DBT-informed skills, Motivational Interviewing and solution-focused therapy  ASSESSMENT: Karissa Blum presents with a somewhat anxious mood  Her affect is normal range and intensity, appropriate  Karissa Blum exhibits good therapeutic rapport with this clinician  Karissa Blum continues to exhibit willingness to work on treatment goals and objectives  Karissa Blum presents with a minimal risk of suicide, minimal risk of self-harm, and minimal risk of harm to others  PLAN: Karissa Blum will return in approximately 2-3 weeks for the next scheduled session  Between sessions, Karissa Blum will continue to work on developing her self-care routine   She will continue to maintain limits and boundaries with friends and potential partners  She will report back during the next session re: successes and barriers  At the next session, this clinician will use supportive psychotherapy, client-centered therapy, mindfulness-based strategies, DBT-informed skills, Motivational Interviewing and solution-focused therapy to address her mood regulation and relationship concerns, in an effort to assist Alvin Marquis with meeting treatment goals  HPI     Past Medical History:   Diagnosis Date    Abnormal Pap smear of cervix     Allergic     Anxiety     Arthritis     Depression     Diabetes mellitus (HCC)     Fibromyalgia, primary     Hypertension     IBS (irritable bowel syndrome)     Migraine     Obesity     Vitamin B12 deficiency        Past Surgical History:   Procedure Laterality Date    COLONOSCOPY N/A 5/8/2018    Procedure: COLONOSCOPY;  Surgeon: Maryjo Capps MD;  Location: [facility]; Service: Gastroenterology    VT EXC SKIN BENIG >4 CM REMAINDR BODY N/A 7/1/2021    Procedure: EXCISION OF PILAR SCALP CYST X 2 AND RIGHT INNER GROIN NEVVUS;  Surgeon: Jose Pennington MD;  Location: [address] [place] [address] OR;  Service: Plastics    VT RECMPL WND SCALP,EXTR 2 6-7 5 CM N/A 7/1/2021    Procedure: CLOSURE WOUND SCALP X 2 AND RIGHT INNER GROIN;  Surgeon: Jose Pennington MD;  Location: [address] [place] [address] OR;  Service: Plastics    TONSILLECTOMY      WISDOM TOOTH EXTRACTION         Current Outpatient Medications   Medication Sig Dispense Refill    Aimovig 140 MG/ML SOAJ Inject 140mg under the skin every 30 (thirty) days   1 mL 10    ALPRAZolam (XANAX) 0 5 mg tablet Take 1 tablet (0 5 mg total) by mouth 2 (two) times a day as needed for anxiety 60 tablet 2    ARIPiprazole (ABILIFY) 2 mg tablet Take 1 tablet (2 mg total) by mouth daily 30 tablet 2    B-D 3CC LUER-DEANN SYR 25GX1" 25G X 1" 3 ML MISC        buPROPion (WELLBUTRIN XL) 150 mg 24 hr tablet Take 1 tablet (150 mg total) by mouth daily 30 tablet 2    Certolizumab Pegol (Cimzia Starter Kit) 6 X 200 MG/ML KIT Inject 1 application under the skin every 30 (thirty) days        escitalopram (LEXAPRO) 20 mg tablet Take 1 tablet (20 mg total) by mouth daily 30 tablet 2    etonogestrel-ethinyl estradiol (NuvaRing) 0 12-0 015 MG/24HR vaginal ring Insert ring for 21 days then remove for one week  3 each 3    indomethacin (INDOCIN) 25 mg capsule 1 tab BID prn severe headache with food  Hold toradol  30 capsule 0    Ixekizumab (Taltz) 80 MG/ML SOAJ Inject under the skin      ketorolac (TORADOL) 10 mg tablet Take 1 tablet (10 mg total) by mouth every 6 (six) hours as needed (migraine) Max 2-3 per week   10 tablet 0    Lidocaine Viscous HCl (XYLOCAINE) 2 % mucosal solution Swish and spit 10 mL 4 (four) times a day as needed for mouth pain or discomfort (Patient not taking: Reported on 4/18/2022 ) 100 mL 2    melatonin 1 mg 1-2 tabs qhs prn insomnia (Patient not taking: Reported on 4/18/2022 ) 60 tablet 2    metFORMIN (GLUCOPHAGE-XR) 500 mg 24 hr tablet Take 2 tablets (1,000 mg total) by mouth daily with breakfast 30 tablet 2    olmesartan (BENICAR) 20 mg tablet Take 1 tablet (20 mg total) by mouth daily 30 tablet 1    onabotulinumtoxin A (BOTOX) 100 units Inject as directed      ondansetron (ZOFRAN) 4 mg tablet Take 1 tablet (4 mg total) by mouth every 6 (six) hours 12 tablet 0    Syringe/Needle, Disp, (SYRINGE 3CC/73IB8-7/4") 27G X 1-1/4" 3 ML MISC Use for IM injection of ketorolac  4 each 0    vitamin B-12 (VITAMIN B-12) 1,000 mcg tablet Take 1,000 mcg by mouth daily  (Patient not taking: Reported on 4/18/2022 )       Current Facility-Administered Medications   Medication Dose Route Frequency Provider Last Rate Last Admin    cyanocobalamin injection 1,000 mcg  1,000 mcg Intramuscular Q30 Days Samella Sessions, DO   1,000 mcg at 08/03/20 0859    cyanocobalamin injection 1,000 mcg  1,000 mcg Intramuscular Q14 Days Samella Sessions, DO   1,000 mcg at 05/18/20 1146    cyanocobalamin injection 1,000 mcg  1,000 mcg Intramuscular Q30 Days Yamilex Mars DO   1,000 mcg at 09/01/20 1129        Allergies   Allergen Reactions    Cimzia [Certolizumab Pegol] Swelling    Desvenlafaxine      Other reaction(s): state of confusion    Valproic Acid Other (See Comments)     Other reaction(s): dilated pupils, "schizi"    Tizanidine Anxiety       Review of Systems    Video Exam    There were no vitals filed for this visit  Physical Exam     I spent 45 minutes directly with the patient during this visit    [address] [facility] [address] verbally agrees to participate in [employer]  Pt is aware that [employer] could be limited without vital signs or the ability to perform a full hands-on physical exam  Cruz Archuleta understands she or the provider may request at any time to terminate the video visit and request the patient to seek care or treatment in person

## 2022-04-24 ENCOUNTER — HOSPITAL ENCOUNTER (EMERGENCY)
Facility: HOSPITAL | Age: 32
Discharge: HOME | End: 2022-04-24
Attending: EMERGENCY MEDICINE | Admitting: EMERGENCY MEDICINE
Payer: COMMERCIAL

## 2022-04-24 VITALS
OXYGEN SATURATION: 97 % | BODY MASS INDEX: 45.36 KG/M2 | TEMPERATURE: 97.4 F | RESPIRATION RATE: 20 BRPM | WEIGHT: 256 LBS | HEART RATE: 100 BPM | DIASTOLIC BLOOD PRESSURE: 106 MMHG | SYSTOLIC BLOOD PRESSURE: 178 MMHG | HEIGHT: 63 IN

## 2022-04-24 DIAGNOSIS — R52 BODY ACHES: ICD-10-CM

## 2022-04-24 DIAGNOSIS — R11.2 NAUSEA & VOMITING: Primary | ICD-10-CM

## 2022-04-24 LAB
ALBUMIN SERPL BCP-MCNC: 3.7 G/DL (ref 3.5–5)
ALP SERPL-CCNC: 55 U/L (ref 34–104)
ALT SERPL W P-5'-P-CCNC: 7 U/L (ref 7–52)
ANION GAP SERPL CALCULATED.3IONS-SCNC: 12 MMOL/L (ref 4–13)
AST SERPL W P-5'-P-CCNC: 9 U/L (ref 13–39)
BACTERIA UR QL AUTO: ABNORMAL /HPF
BASOPHILS # BLD AUTO: 0.04 THOUSANDS/ΜL (ref 0–0.1)
BASOPHILS NFR BLD AUTO: 0 % (ref 0–1)
BILIRUB SERPL-MCNC: 0.49 MG/DL (ref 0.2–1)
BILIRUB UR QL STRIP: NEGATIVE
BUN SERPL-MCNC: 6 MG/DL (ref 5–25)
CALCIUM SERPL-MCNC: 8.9 MG/DL (ref 8.4–10.2)
CHLORIDE SERPL-SCNC: 105 MMOL/L (ref 96–108)
CLARITY UR: ABNORMAL
CO2 SERPL-SCNC: 20 MMOL/L (ref 21–32)
COLOR UR: YELLOW
CREAT SERPL-MCNC: 0.69 MG/DL (ref 0.6–1.3)
EOSINOPHIL # BLD AUTO: 0.28 THOUSAND/ΜL (ref 0–0.61)
EOSINOPHIL NFR BLD AUTO: 3 % (ref 0–6)
ERYTHROCYTE [DISTWIDTH] IN BLOOD BY AUTOMATED COUNT: 14.4 % (ref 11.6–15.1)
FLUAV RNA RESP QL NAA+PROBE: NEGATIVE
FLUBV RNA RESP QL NAA+PROBE: NEGATIVE
GFR SERPL CREATININE-BSD FRML MDRD: 117 ML/MIN/1.73SQ M
GLUCOSE SERPL-MCNC: 83 MG/DL (ref 65–140)
GLUCOSE UR STRIP-MCNC: NEGATIVE MG/DL
HCT VFR BLD AUTO: 38.1 % (ref 34.8–46.1)
HGB BLD-MCNC: 12.3 G/DL (ref 11.5–15.4)
HGB UR QL STRIP.AUTO: NEGATIVE
IMM GRANULOCYTES # BLD AUTO: 0.03 THOUSAND/UL (ref 0–0.2)
IMM GRANULOCYTES NFR BLD AUTO: 0 % (ref 0–2)
KETONES UR STRIP-MCNC: NEGATIVE MG/DL
LEUKOCYTE ESTERASE UR QL STRIP: ABNORMAL
LIPASE SERPL-CCNC: 19 U/L (ref 11–82)
LYMPHOCYTES # BLD AUTO: 3.65 THOUSANDS/ΜL (ref 0.6–4.47)
LYMPHOCYTES NFR BLD AUTO: 38 % (ref 14–44)
MCH RBC QN AUTO: 28.5 PG (ref 26.8–34.3)
MCHC RBC AUTO-ENTMCNC: 32.3 G/DL (ref 31.4–37.4)
MCV RBC AUTO: 88 FL (ref 82–98)
MONOCYTES # BLD AUTO: 0.73 THOUSAND/ΜL (ref 0.17–1.22)
MONOCYTES NFR BLD AUTO: 8 % (ref 4–12)
NEUTROPHILS # BLD AUTO: 4.91 THOUSANDS/ΜL (ref 1.85–7.62)
NEUTS SEG NFR BLD AUTO: 51 % (ref 43–75)
NITRITE UR QL STRIP: NEGATIVE
NON-SQ EPI CELLS URNS QL MICRO: ABNORMAL /HPF
NRBC BLD AUTO-RTO: 0 /100 WBCS
PH UR STRIP.AUTO: 6 [PH]
PLATELET # BLD AUTO: 410 THOUSANDS/UL (ref 149–390)
PMV BLD AUTO: 9 FL (ref 8.9–12.7)
POTASSIUM SERPL-SCNC: 3.5 MMOL/L (ref 3.5–5.3)
PROT SERPL-MCNC: 7 G/DL (ref 6.4–8.4)
PROT UR STRIP-MCNC: NEGATIVE MG/DL
RBC # BLD AUTO: 4.31 MILLION/UL (ref 3.81–5.12)
RBC #/AREA URNS AUTO: ABNORMAL /HPF
RSV RNA RESP QL NAA+PROBE: NEGATIVE
SARS-COV-2 RNA RESP QL NAA+PROBE: NEGATIVE
SODIUM SERPL-SCNC: 137 MMOL/L (ref 135–147)
SP GR UR STRIP.AUTO: >=1.03 (ref 1–1.03)
UROBILINOGEN UR QL STRIP.AUTO: 0.2 E.U./DL
WBC # BLD AUTO: 9.64 THOUSAND/UL (ref 4.31–10.16)
WBC #/AREA URNS AUTO: ABNORMAL /HPF

## 2022-04-24 PROCEDURE — 81001 URINALYSIS AUTO W/SCOPE: CPT | Performed by: EMERGENCY MEDICINE

## 2022-04-24 PROCEDURE — 99284 EMERGENCY DEPT VISIT MOD MDM: CPT | Performed by: EMERGENCY MEDICINE

## 2022-04-24 PROCEDURE — 96374 THER/PROPH/DIAG INJ IV PUSH: CPT

## 2022-04-24 PROCEDURE — 80053 COMPREHEN METABOLIC PANEL: CPT | Performed by: EMERGENCY MEDICINE

## 2022-04-24 PROCEDURE — 83690 ASSAY OF LIPASE: CPT | Performed by: EMERGENCY MEDICINE

## 2022-04-24 PROCEDURE — 81003 URINALYSIS AUTO W/O SCOPE: CPT | Performed by: EMERGENCY MEDICINE

## 2022-04-24 PROCEDURE — 85025 COMPLETE CBC W/AUTO DIFF WBC: CPT | Performed by: EMERGENCY MEDICINE

## 2022-04-24 PROCEDURE — 0241U HB NFCT DS VIR RESP RNA 4 TRGT: CPT | Performed by: EMERGENCY MEDICINE

## 2022-04-24 PROCEDURE — 96361 HYDRATE IV INFUSION ADD-ON: CPT

## 2022-04-24 PROCEDURE — 36415 COLL VENOUS BLD VENIPUNCTURE: CPT | Performed by: EMERGENCY MEDICINE

## 2022-04-24 PROCEDURE — 99283 EMERGENCY DEPT VISIT LOW MDM: CPT

## 2022-04-24 RX ORDER — ONDANSETRON 2 MG/ML
4 INJECTION INTRAMUSCULAR; INTRAVENOUS ONCE
Status: COMPLETED | OUTPATIENT
Start: 2022-04-24 | End: 2022-04-24

## 2022-04-24 RX ADMIN — SODIUM CHLORIDE 1000 ML: 0.9 INJECTION, SOLUTION INTRAVENOUS at 21:21

## 2022-04-24 RX ADMIN — ONDANSETRON 4 MG: 2 INJECTION INTRAMUSCULAR; INTRAVENOUS at 21:21

## 2022-04-25 NOTE — ED PROVIDER NOTES
History  Chief Complaint   Patient presents with    Vomiting     for last week    Medical Problem     body aches     Patient is a 81-year-old female who presents for evaluation of body aches, nausea, vomiting, loose stools  Patient says the symptoms have been present for the past 3 weeks and she started a new medicine for her ankylosing spondylitis  Patient says that she stopped the medication as soon as she started taking it    She has not been able to get an appointment for her rheumatologist   This is the patient's 3rd visit since 04/16 for similar symptoms  Patient says she was unable to eat today secondary to nausea  She admits to intermittent fevers  The patient is afebrile here, her vitals are within normal limits except for some mild hypertension  Prior to Admission Medications   Prescriptions Last Dose Informant Patient Reported? Taking? ALPRAZolam (XANAX) 0 5 mg tablet   No No   Sig: Take 1 tablet (0 5 mg total) by mouth 2 (two) times a day as needed for anxiety   ARIPiprazole (ABILIFY) 2 mg tablet   No No   Sig: Take 1 tablet (2 mg total) by mouth daily   Aimovig 140 MG/ML SOAJ   No No   Sig: Inject 140mg under the skin every 30 (thirty) days  B-D 3CC LUER-DEANN SYR 25GX1" 25G X 1" 3 ML MISC  Self Yes No   Sig:     Certolizumab Pegol (Cimzia Starter Kit) 6 X 200 MG/ML KIT   Yes No   Sig: Inject 1 application under the skin every 30 (thirty) days     Ixekizumab (Taltz) 80 MG/ML SOAJ   Yes No   Sig: Inject under the skin   Lidocaine Viscous HCl (XYLOCAINE) 2 % mucosal solution   No No   Sig: Swish and spit 10 mL 4 (four) times a day as needed for mouth pain or discomfort   Patient not taking: Reported on 4/18/2022    Syringe/Needle, Disp, (SYRINGE 3CC/17ZL7-4/4") 27G X 1-1/4" 3 ML MISC  Self No No   Sig: Use for IM injection of ketorolac     buPROPion (WELLBUTRIN XL) 150 mg 24 hr tablet   No No   Sig: Take 1 tablet (150 mg total) by mouth daily   escitalopram (LEXAPRO) 20 mg tablet   No No Sig: Take 1 tablet (20 mg total) by mouth daily   etonogestrel-ethinyl estradiol (NuvaRing) 0 12-0 015 MG/24HR vaginal ring   No No   Sig: Insert ring for 21 days then remove for one week  indomethacin (INDOCIN) 25 mg capsule   No No   Si tab BID prn severe headache with food  Hold toradol    ketorolac (TORADOL) 10 mg tablet   No No   Sig: Take 1 tablet (10 mg total) by mouth every 6 (six) hours as needed (migraine) Max 2-3 per week  melatonin 1 mg  Self No No   Si-2 tabs qhs prn insomnia   Patient not taking: Reported on 2022    metFORMIN (GLUCOPHAGE-XR) 500 mg 24 hr tablet   No No   Sig: Take 2 tablets (1,000 mg total) by mouth daily with breakfast   olmesartan (BENICAR) 20 mg tablet   No No   Sig: Take 1 tablet (20 mg total) by mouth daily   onabotulinumtoxin A (BOTOX) 100 units  Self Yes No   Sig: Inject as directed   ondansetron (ZOFRAN) 4 mg tablet   No No   Sig: Take 1 tablet (4 mg total) by mouth every 6 (six) hours   vitamin B-12 (VITAMIN B-12) 1,000 mcg tablet  Self Yes No   Sig: Take 1,000 mcg by mouth daily    Patient not taking: Reported on 2022       Facility-Administered Medications Last Administration Doses Remaining   cyanocobalamin injection 1,000 mcg 8/3/2020  8:59 AM    cyanocobalamin injection 1,000 mcg 2020 11:46 AM    cyanocobalamin injection 1,000 mcg 2020 11:29 AM           Past Medical History:   Diagnosis Date    Abnormal Pap smear of cervix     Allergic     Anxiety     Arthritis     Depression     Diabetes mellitus ([facility] [address] )     Fibromyalgia, primary     Hypertension     IBS (irritable bowel syndrome)     Migraine     Obesity     Vitamin B12 deficiency        Past Surgical History:   Procedure Laterality Date    COLONOSCOPY N/A 2018    Procedure: COLONOSCOPY;  Surgeon: Hiren Foster MD;  Location: [facility] GI LAB;   Service: Gastroenterology    TX EXC SKIN BENIG >4 CM REMAINDR BODY N/A 2021    Procedure: EXCISION OF PILAR SCALP CYST X 2 AND RIGHT INNER GROIN NEVVUS;  Surgeon: Trudy Tsang MD;  Location:  MAIN OR;  Service: Plastics    AL RECMPL WND SCALP,EXTR 2 6-7 5 CM N/A 7/1/2021    Procedure: CLOSURE WOUND SCALP X 2 AND RIGHT INNER GROIN;  Surgeon: Trudy Tsang MD;  Location: [address] [place] [address] MAIN OR;  Service: Plastics    TONSILLECTOMY      WISDOM TOOTH EXTRACTION         Family History   Problem Relation Age of Onset    Rheum arthritis Mother     Psoriasis Mother     Other Mother     Hypertension Mother     Diabetes unspecified Mother     Sjogren's syndrome Mother     Alcohol abuse Mother     Drug abuse Mother     Anxiety disorder Father     Alcohol abuse Father     Lung cancer Maternal Grandfather     Cancer Other         bone    Diabetes Other     Other Other         High blood pressure    Depression Maternal Grandmother      I have reviewed and agree with the history as documented  E-Cigarette/Vaping    E-Cigarette Use Current Every Day User     Start Date 7/1/19     Comments 1 cartridge every 2 months       E-Cigarette/Vaping Substances    Nicotine No     THC Yes     CBD Yes     Flavoring No     Other No     Unknown No      Social History     Tobacco Use    Smoking status: Never Smoker    Smokeless tobacco: Never Used   Vaping Use    Vaping Use: Every day    Start date: 7/1/2019    Substances: THC, CBD   Substance Use Topics    Alcohol use: Yes     Comment: rarely    Drug use: Yes     Frequency: 2 0 times per week     Types: Marijuana     Comment: medical marijuana (vape)       Review of Systems   Constitutional: Negative for chills, diaphoresis and fever  HENT: Negative for congestion, sinus pressure, sore throat and trouble swallowing  Eyes: Negative for pain, discharge and itching  Respiratory: Negative for cough, chest tightness, shortness of breath and wheezing  Cardiovascular: Negative for chest pain, palpitations and leg swelling     Gastrointestinal: Positive for diarrhea, nausea and vomiting  Negative for abdominal distention, abdominal pain and blood in stool  Endocrine: Negative for polyphagia and polyuria  Genitourinary: Negative for difficulty urinating, dysuria, flank pain, hematuria, pelvic pain and vaginal bleeding  Musculoskeletal: Positive for myalgias  Negative for arthralgias and back pain  Skin: Negative for rash  Neurological: Negative for dizziness, syncope, weakness, light-headedness and headaches  Physical Exam  Physical Exam  Vitals and nursing note reviewed  Constitutional:       General: She is not in acute distress  Appearance: She is well-developed  HENT:      Head: Normocephalic and atraumatic  Right Ear: External ear normal       Left Ear: External ear normal       Nose: Nose normal       Mouth/Throat:      Mouth: Mucous membranes are moist       Pharynx: No oropharyngeal exudate  Eyes:      Conjunctiva/sclera: Conjunctivae normal       Pupils: Pupils are equal, round, and reactive to light  Cardiovascular:      Rate and Rhythm: Normal rate and regular rhythm  Heart sounds: Normal heart sounds  No murmur heard  No friction rub  No gallop  Pulmonary:      Effort: Pulmonary effort is normal  No respiratory distress  Breath sounds: Normal breath sounds  No wheezing or rales  Abdominal:      General: There is no distension  Palpations: Abdomen is soft  Tenderness: There is no abdominal tenderness  There is no guarding  Musculoskeletal:         General: No swelling, tenderness or deformity  Normal range of motion  Cervical back: Normal range of motion and neck supple  Lymphadenopathy:      Cervical: No cervical adenopathy  Skin:     General: Skin is warm and dry  Neurological:      General: No focal deficit present  Mental Status: She is alert and oriented to person, place, and time  Mental status is at baseline  Cranial Nerves: No cranial nerve deficit  Sensory: No sensory deficit  Motor: No weakness or abnormal muscle tone  Coordination: Coordination normal          Vital Signs  ED Triage Vitals [04/24/22 1932]   Temperature Pulse Respirations Blood Pressure SpO2   (!) 97 4 °F (36 3 °C) 100 20 (!) 178/106 97 %      Temp Source Heart Rate Source Patient Position - Orthostatic VS BP Location FiO2 (%)   Tympanic Monitor Sitting Right arm --      Pain Score       6           Vitals:    04/24/22 1932   BP: (!) 178/106   Pulse: 100   Patient Position - Orthostatic VS: Sitting         Visual Acuity      ED Medications  Medications   sodium chloride 0 9 % bolus 1,000 mL (0 mL Intravenous Stopped 4/24/22 2221)   ondansetron (ZOFRAN) injection 4 mg (4 mg Intravenous Given 4/24/22 2121)       Diagnostic Studies  Results Reviewed     Procedure Component Value Units Date/Time    COVID/FLU/RSV - 2 hour TAT [[id number]]  (Normal) Collected: 04/24/22 2108    Lab Status: Final result Specimen: Nares from Nose Updated: 04/24/22 2151     SARS-CoV-2 Negative     INFLUENZA A PCR Negative     INFLUENZA B PCR Negative     RSV PCR Negative    Narrative:      FOR PEDIATRIC PATIENTS - copy/paste COVID Guidelines URL to browser: [email or web address]    SARS-CoV-2 assay is a Nucleic Acid Amplification assay intended for the  qualitative detection of nucleic acid from SARS-CoV-2 in nasopharyngeal  swabs  Results are for the presumptive identification of SARS-CoV-2 RNA  Positive results are indicative of infection with SARS-CoV-2, the virus  causing COVID-19, but do not rule out bacterial infection or co-infection  with other viruses  Laboratories within the [place] and its  territories are required to report all positive results to the appropriate  public health authorities  Negative results do not preclude SARS-CoV-2  infection and should not be used as the sole basis for treatment or other  patient management decisions   Negative results must be combined with  clinical observations, patient history, and epidemiological information  This test has not been FDA cleared or approved  This test has been authorized by FDA under an Emergency Use Authorization  (EUA)  This test is only authorized for the duration of time the  declaration that circumstances exist justifying the authorization of the  emergency use of an in vitro diagnostic tests for detection of SARS-CoV-2  virus and/or diagnosis of COVID-19 infection under section 564(b)(1) of  the Act, 21 U  S C  309KIC-5(W)(8), unless the authorization is terminated  or revoked sooner  The test has been validated but independent review by FDA  and CLIA is pending  Test performed using [employer] GeneXpert: This RT-PCR assay targets N2,  a region unique to SARS-CoV-2  A conserved region in the E-gene was chosen  for pan-Sarbecovirus detection which includes SARS-CoV-2      Comprehensive metabolic panel [[id number]]  (Abnormal) Collected: 04/24/22 2117    Lab Status: Final result Specimen: Blood from Arm, Left Updated: 04/24/22 2139     Sodium 137 mmol/L      Potassium 3 5 mmol/L      Chloride 105 mmol/L      CO2 20 mmol/L      ANION GAP 12 mmol/L      BUN 6 mg/dL      Creatinine 0 69 mg/dL      Glucose 83 mg/dL      Calcium 8 9 mg/dL      AST 9 U/L      ALT 7 U/L      Alkaline Phosphatase 55 U/L      Total Protein 7 0 g/dL      Albumin 3 7 g/dL      Total Bilirubin 0 49 mg/dL      eGFR 117 ml/min/1 73sq m     Narrative:      James guidelines for Chronic Kidney Disease (CKD):     Stage 1 with normal or high GFR (GFR > 90 mL/min/1 73 square meters)    Stage 2 Mild CKD (GFR = 60-89 mL/min/1 73 square meters)    Stage 3A Moderate CKD (GFR = 45-59 mL/min/1 73 square meters)    Stage 3B Moderate CKD (GFR = 30-44 mL/min/1 73 square meters)    Stage 4 Severe CKD (GFR = 15-29 mL/min/1 73 square meters)    Stage 5 End Stage CKD (GFR <15 mL/min/1 73 square meters)  Note: GFR calculation is accurate only with a steady state creatinine    Lipase [[id number]]  (Normal) Collected: 04/24/22 2117    Lab Status: Final result Specimen: Blood from Arm, Left Updated: 04/24/22 2139     Lipase 19 u/L     Urine Microscopic [[id number]]  (Abnormal) Collected: 04/24/22 2108    Lab Status: Final result Specimen: Urine, Clean Catch Updated: 04/24/22 2127     RBC, UA None Seen /hpf      WBC, UA 4-10 /hpf      Epithelial Cells Moderate /hpf      Bacteria, UA Moderate /hpf     CBC and differential [[id number]]  (Abnormal) Collected: 04/24/22 2117    Lab Status: Final result Specimen: Blood from Arm, Left Updated: 04/24/22 2122     WBC 9 64 Thousand/uL      RBC 4 31 Million/uL      Hemoglobin 12 3 g/dL      Hematocrit 38 1 %      MCV 88 fL      MCH 28 5 pg      MCHC 32 3 g/dL      RDW 14 4 %      MPV 9 0 fL      Platelets 617 Thousands/uL      nRBC 0 /100 WBCs      Neutrophils Relative 51 %      Immat GRANS % 0 %      Lymphocytes Relative 38 %      Monocytes Relative 8 %      Eosinophils Relative 3 %      Basophils Relative 0 %      Neutrophils Absolute 4 91 Thousands/µL      Immature Grans Absolute 0 03 Thousand/uL      Lymphocytes Absolute 3 65 Thousands/µL      Monocytes Absolute 0 73 Thousand/µL      Eosinophils Absolute 0 28 Thousand/µL      Basophils Absolute 0 04 Thousands/µL     UA (URINE) with reflex to Scope [[id number]]  (Abnormal) Collected: 04/24/22 2108    Lab Status: Final result Specimen: Urine, Clean Catch Updated: 04/24/22 2115     Color, UA Yellow     Clarity, UA Slightly Cloudy     Specific Gravity, UA >=1 030     pH, UA 6 0     Leukocytes, UA 1+     Nitrite, UA Negative     Protein, UA Negative mg/dl      Glucose, UA Negative mg/dl      Ketones, UA Negative mg/dl      Urobilinogen, UA 0 2 E U /dl      Bilirubin, UA Negative     Blood, UA Negative                 No orders to display              Procedures  Procedures         ED Course  ED Course as of 04/27/22 1224   Sun Apr 24, 2022 2226 Patient denies any urinary symptoms at this time  Do not believe she needs antibiotics at this time                                             MDM  Number of Diagnoses or Management Options  Diagnosis management comments: 25-year-old female presenting for evaluation of body aches, nausea, vomiting, loose stools  Has been present for 3 weeks  Thinks it is due to a medication that was started for ankylosing spondylitis  Stop the medication 3 weeks ago  This is her 3rd visit since the 16th for similar symptoms  Vitals are within normal limits  Will obtain belly labs, UA, urine pregnancy  Disposition  Final diagnoses:   Nausea & vomiting   Body aches     Time reflects when diagnosis was documented in both MDM as applicable and the Disposition within this note     Time User Action Codes Description Comment    4/24/2022 10:27 PM Darwyn Spine Add [R11 2] Nausea & vomiting     4/24/2022 10:27 PM Darwyn Spine Add [R52] Body aches       ED Disposition     ED Disposition Condition Date/Time Comment    Discharge Stable Sun Apr 24, 2022 10:26 PM Esteban Olsen discharge to home/self care  Follow-up Information     Follow up With Specialties Details Why Contact Daniele Corley DO Family Medicine Schedule an appointment as soon as possible for a visit  For follow up [address]  [phone]            Discharge Medication List as of 4/24/2022 10:28 PM      CONTINUE these medications which have NOT CHANGED    Details   Aimovig 140 MG/ML SOAJ Inject 140mg under the skin every 30 (thirty) days  , Normal      ALPRAZolam (XANAX) 0 5 mg tablet Take 1 tablet (0 5 mg total) by mouth 2 (two) times a day as needed for anxiety, Starting Tue 3/15/2022, Normal      ARIPiprazole (ABILIFY) 2 mg tablet Take 1 tablet (2 mg total) by mouth daily, Starting Fri 12/10/2021, Normal      !! B-D 3CC LUER-DEANN SYR 25GX1" 25G X 1" 3 ML MISC  , Starting Thu 7/26/2018, Historical Med      buPROPion (WELLBUTRIN XL) 150 mg 24 hr tablet Take 1 tablet (150 mg total) by mouth daily, Starting Fri 49/63/5318, Normal      Certolizumab Pegol (Cimzia Starter Kit) 6 X 200 MG/ML KIT Inject 1 application under the skin every 30 (thirty) days  , Starting Wed 2/2/2022, Historical Med      escitalopram (LEXAPRO) 20 mg tablet Take 1 tablet (20 mg total) by mouth daily, Starting Fri 12/10/2021, Normal      etonogestrel-ethinyl estradiol (NuvaRing) 0 12-0 015 MG/24HR vaginal ring Insert ring for 21 days then remove for one week , Normal      indomethacin (INDOCIN) 25 mg capsule 1 tab BID prn severe headache with food  Hold toradol , Normal      Ixekizumab (Taltz) 80 MG/ML SOAJ Inject under the skin, Historical Med      ketorolac (TORADOL) 10 mg tablet Take 1 tablet (10 mg total) by mouth every 6 (six) hours as needed (migraine) Max 2-3 per week , Starting Tue 3/22/2022, Normal      Lidocaine Viscous HCl (XYLOCAINE) 2 % mucosal solution Swish and spit 10 mL 4 (four) times a day as needed for mouth pain or discomfort, Starting Mon 4/4/2022, Normal      melatonin 1 mg 1-2 tabs qhs prn insomnia, Normal      metFORMIN (GLUCOPHAGE-XR) 500 mg 24 hr tablet Take 2 tablets (1,000 mg total) by mouth daily with breakfast, Starting Wed 2/23/2022, Normal      olmesartan (BENICAR) 20 mg tablet Take 1 tablet (20 mg total) by mouth daily, Starting Tue 3/15/2022, Normal      onabotulinumtoxin A (BOTOX) 100 units Inject as directed, Starting Tue 8/29/2017, Historical Med      ondansetron (ZOFRAN) 4 mg tablet Take 1 tablet (4 mg total) by mouth every 6 (six) hours, Starting Tue 4/19/2022, Normal      !! Syringe/Needle, Disp, (SYRINGE 3CC/85DV3-4/4") 27G X 1-1/4" 3 ML MISC Use for IM injection of ketorolac , Normal      vitamin B-12 (VITAMIN B-12) 1,000 mcg tablet Take 1,000 mcg by mouth daily , Historical Med       !! - Potential duplicate medications found  Please discuss with provider  No discharge procedures on file      PDMP Review       Value Time User    PDMP Reviewed  Yes 3/15/2022  2:33 PM Reji Quinteros MD          ED Provider  Electronically Signed by           Christel Johnson DO  04/27/22 0783

## 2022-04-26 ENCOUNTER — PROCEDURE VISIT (OUTPATIENT)
Dept: NEUROLOGY | Facility: CLINIC | Age: 32
End: 2022-04-26
Payer: COMMERCIAL

## 2022-04-26 VITALS — HEART RATE: 97 BPM | DIASTOLIC BLOOD PRESSURE: 80 MMHG | TEMPERATURE: 96.6 F | SYSTOLIC BLOOD PRESSURE: 110 MMHG

## 2022-04-26 DIAGNOSIS — G43.709 CHRONIC MIGRAINE WITHOUT AURA WITHOUT STATUS MIGRAINOSUS, NOT INTRACTABLE: Primary | ICD-10-CM

## 2022-04-26 PROCEDURE — 64615 CHEMODENERV MUSC MIGRAINE: CPT | Performed by: PHYSICIAN ASSISTANT

## 2022-04-26 NOTE — PROGRESS NOTES
Universal Protocol   Consent: Verbal consent obtained  Written consent obtained    Risks and benefits: risks, benefits and alternatives were discussed  Consent given by: patient  Patient understanding: patient states understanding of the procedure being performed  Patient consent: the patient's understanding of the procedure matches consent given  Procedure consent: procedure consent matches procedure scheduled        Chemodenervation     Date/Time 4/26/2022 7:54 AM     Performed by  Mary De Los Santos PA-C     Authorized by Mary De Los Santos PA-C        Pre-procedure details      Prepped With: Alcohol     Procedure details     Position:  Upright   Botox     Botox Type:  Type A    Brand:  Botox    mL's of Botulinum Toxin:  185    Final Concentration per CC:  100 units    Needle Gauge:  30 G 2 5 inch   Procedures     Botox Procedures: chronic headache      Indications: migraines     Injection Location      Head / Face:  L superior trapezius, R superior trapezius, L superior cervical paraspinal, R superior cervical paraspinal, L , R , procerus, L temporalis, R temporalis, R frontalis, L frontalis, R medial occipitalis and L medial occipitalis    L  injection amount:  5 unit(s)    R  injection amount:  5 unit(s)    L lateral frontalis:  5 unit(s)    R lateral frontalis:  5 unit(s)    L medial frontalis:  5 unit(s)    R medial frontalis:  5 unit(s)    L temporalis injection amount:  20 unit(s)    R temporalis injection amount:  20 unit(s)    Procerus injection amount:  5 unit(s)    L medial occipitalis injection amount:  15 unit(s)    R medial occipitalis injection amount:  15 unit(s)    L superior cervical paraspinal injection amount:  10 unit(s)    R superior cervical paraspinal injection amount:  10 unit(s)    L superior trapezius injection amount:  0 unit(s)    R superior trapezius injection amount:  0 unit(s)   Total Units     Total units used:  185    Total units discarded:  15 Post-procedure details      Chemodenervation:  Chronic migraine    Facial Nerve Location[de-identified]  Bilateral facial nerve    Patient tolerance of procedure: Tolerated well, no immediate complications   Comments      Extra units medically necessary:  - 30 units between the R and L headband region/ scalp  - 20 units between each anterior temporalis  - 10 units in the occipitalis muscles b/l  Avoided traps b/l  Blood pressure 110/80, pulse 97, temperature (!) 96 6 °F (35 9 °C), not currently breastfeeding

## 2022-05-09 DIAGNOSIS — M79.7 FIBROMYALGIA: ICD-10-CM

## 2022-05-10 ENCOUNTER — OFFICE VISIT (OUTPATIENT)
Dept: FAMILY MEDICINE CLINIC | Facility: CLINIC | Age: 32
End: 2022-05-10
Payer: COMMERCIAL

## 2022-05-10 VITALS
TEMPERATURE: 97.7 F | RESPIRATION RATE: 20 BRPM | SYSTOLIC BLOOD PRESSURE: 120 MMHG | OXYGEN SATURATION: 98 % | WEIGHT: 254.8 LBS | BODY MASS INDEX: 45.15 KG/M2 | HEART RATE: 93 BPM | DIASTOLIC BLOOD PRESSURE: 88 MMHG | HEIGHT: 63 IN

## 2022-05-10 DIAGNOSIS — E16.1 HYPERINSULINEMIA: ICD-10-CM

## 2022-05-10 DIAGNOSIS — E66.01 MORBID OBESITY WITH BMI OF 40.0-44.9, ADULT (HCC): ICD-10-CM

## 2022-05-10 DIAGNOSIS — B89 PARASITIC INFECTION: Primary | ICD-10-CM

## 2022-05-10 PROCEDURE — 99213 OFFICE O/P EST LOW 20 MIN: CPT | Performed by: FAMILY MEDICINE

## 2022-05-10 RX ORDER — ARIPIPRAZOLE 2 MG/1
2 TABLET ORAL DAILY
Qty: 30 TABLET | Refills: 2 | Status: SHIPPED | OUTPATIENT
Start: 2022-05-10 | End: 2022-08-09 | Stop reason: SDUPTHER

## 2022-05-10 RX ORDER — METFORMIN HYDROCHLORIDE 500 MG/1
1000 TABLET, EXTENDED RELEASE ORAL
Qty: 60 TABLET | Refills: 2 | Status: SHIPPED | OUTPATIENT
Start: 2022-05-10

## 2022-05-10 NOTE — PROGRESS NOTES
Assessment/Plan:       Diagnoses and all orders for this visit:    Parasitic infection  -     mebendazole (VERMOX) 100 MG chewable tablet; Chew 1 tablet (100 mg total) 2 (two) times a day for 3 days            Subjective:        Patient ID: Mile Zelaya is a 28 y o  female  Patient here due to exposure to possible GI worm  Has been around friends child who was diagnosis with warms  Patient has use toilet that the patient with warms has used also  Patient asymptomatic in this regard at this time no abdominal pain or nausea vomiting or fevers or chills or change in stool habits  The following portions of the patient's history were reviewed and updated as appropriate: allergies, current medications, past family history, past medical history, past social history, past surgical history and problem list       Review of Systems   Constitutional: Negative  HENT: Negative  Eyes: Negative  Respiratory: Negative  Cardiovascular: Negative  Gastrointestinal: Negative  Endocrine: Negative  Genitourinary: Negative  Musculoskeletal: Negative  Skin: Negative  Allergic/Immunologic: Negative  Neurological: Negative  Hematological: Negative  Psychiatric/Behavioral: Negative  Objective:               /88 (BP Location: Right arm, Patient Position: Sitting, Cuff Size: Standard)   Pulse 93   Temp 97 7 °F (36 5 °C) (Temporal)   Resp 20   Ht 5' 3" (1 6 m)   Wt 116 kg (254 lb 12 8 oz)   LMP  (LMP Unknown)   SpO2 98%   BMI 45 14 kg/m²          Physical Exam  Vitals and nursing note reviewed  Constitutional:       Appearance: Normal appearance  Abdominal:      General: Abdomen is flat  Palpations: Abdomen is soft  Neurological:      Mental Status: She is alert

## 2022-05-13 ENCOUNTER — TELEMEDICINE (OUTPATIENT)
Dept: BEHAVIORAL/MENTAL HEALTH CLINIC | Facility: CLINIC | Age: 32
End: 2022-05-13
Payer: COMMERCIAL

## 2022-05-13 DIAGNOSIS — F33.1 DEPRESSION, MAJOR, RECURRENT, MODERATE (HCC): Primary | Chronic | ICD-10-CM

## 2022-05-13 DIAGNOSIS — F41.1 GENERALIZED ANXIETY DISORDER: Chronic | ICD-10-CM

## 2022-05-13 PROCEDURE — 90834 PSYTX W PT 45 MINUTES: CPT | Performed by: SOCIAL WORKER

## 2022-05-18 ENCOUNTER — TELEMEDICINE (OUTPATIENT)
Dept: PSYCHIATRY | Facility: CLINIC | Age: 32
End: 2022-05-18
Payer: COMMERCIAL

## 2022-05-18 DIAGNOSIS — F33.1 DEPRESSION, MAJOR, RECURRENT, MODERATE (HCC): Chronic | ICD-10-CM

## 2022-05-18 DIAGNOSIS — F41.1 GENERALIZED ANXIETY DISORDER: Chronic | ICD-10-CM

## 2022-05-18 PROCEDURE — 99213 OFFICE O/P EST LOW 20 MIN: CPT | Performed by: PSYCHIATRY & NEUROLOGY

## 2022-05-18 RX ORDER — BUPROPION HYDROCHLORIDE 300 MG/1
300 TABLET ORAL DAILY
Qty: 30 TABLET | Refills: 2 | Status: SHIPPED | OUTPATIENT
Start: 2022-05-18

## 2022-05-18 RX ORDER — ALPRAZOLAM 0.5 MG/1
0.5 TABLET ORAL 2 TIMES DAILY PRN
Qty: 60 TABLET | Refills: 2 | Status: SHIPPED | OUTPATIENT
Start: 2022-05-18

## 2022-05-18 RX ORDER — ESCITALOPRAM OXALATE 20 MG/1
20 TABLET ORAL DAILY
Qty: 30 TABLET | Refills: 2 | Status: SHIPPED | OUTPATIENT
Start: 2022-05-18

## 2022-05-18 NOTE — PSYCH
Virtual Regular Visit    Verification of patient location:    Patient is located in the following state in which I hold an active license [place]      Assessment/Plan:    Problem List Items Addressed This Visit        Other    Depression, major, recurrent, moderate (HCC) (Chronic)    Relevant Medications    ALPRAZolam (XANAX) 0 5 mg tablet    escitalopram (LEXAPRO) 20 mg tablet    buPROPion (WELLBUTRIN XL) 300 mg 24 hr tablet    Generalized anxiety disorder (Chronic)    Relevant Medications    ALPRAZolam (XANAX) 0 5 mg tablet    escitalopram (LEXAPRO) 20 mg tablet    buPROPion (WELLBUTRIN XL) 300 mg 24 hr tablet                   Reason for visit is   Chief Complaint   Patient presents with    Virtual Regular Visit        Encounter provider Ralph Dunn MD    Provider located at [address] [place] [id number] [phone]      Recent Visits  No visits were found meeting these conditions  Showing recent visits within past 7 days and meeting all other requirements  Today's Visits  Date Type Provider Dept   05/18/22 MD Jami Ziegler 18 today's visits and meeting all other requirements  Future Appointments  No visits were found meeting these conditions  Showing future appointments within next 150 days and meeting all other requirements       The patient was identified by name and date of birth  Esteban Olsen was informed that this is a telemedicine visit and that the visit is being conducted throughic Embedded and patient was informed this is a secure, HIPAA-complaint platform  She agrees to proceed     My office door was closed  No one else was in the room  She acknowledged consent and understanding of privacy and security of the video platform  The patient has agreed to participate and understands they can discontinue the visit at any time      Patient is aware this is a billable service  Wojciech Beaver is a 28 y o  female with MDD and BESSY   Brenton Pratt stated she continues to struggle with anxiety but Alprazolam prn has helped her manage her symptoms  She stated since last seen she broke up with her 2 year boyfriend  She stated the breakup is for the best but she still misses him because he was a good friend  She continues to meet with her counselor on a regular basis  She denies recent health changes or new medications  She agrees to current treatment and will try dose increase on Wellbutrin  mg qam for increased depression symptoms    Will schedule follow up in 3 months or sooner if needed      HPI     Past Medical History:   Diagnosis Date    Abnormal Pap smear of cervix     Allergic     Anxiety     Arthritis     Depression     Diabetes mellitus ([address] )     Fibromyalgia, primary     Hypertension     IBS (irritable bowel syndrome)     Migraine     Obesity     Vitamin B12 deficiency        Past Surgical History:   Procedure Laterality Date    COLONOSCOPY N/A 5/8/2018    Procedure: COLONOSCOPY;  Surgeon: Silvia Arauz MD;  Location: [facility]; Service: Gastroenterology    HI EXC SKIN BENIG >4 CM REMAINDR BODY N/A 7/1/2021    Procedure: EXCISION OF PILAR SCALP CYST X 2 AND RIGHT INNER GROIN NEVVUS;  Surgeon: Magui Rosario MD;  Location: [address] [place] [address] MAIN OR;  Service: Plastics    HI RECMPL WND SCALP,EXTR 2 6-7 5 CM N/A 7/1/2021    Procedure: CLOSURE WOUND SCALP X 2 AND RIGHT INNER GROIN;  Surgeon: Magui Rosario MD;  Location:  MAIN OR;  Service: Plastics    TONSILLECTOMY      WISDOM TOOTH EXTRACTION         Current Outpatient Medications   Medication Sig Dispense Refill    ALPRAZolam (XANAX) 0 5 mg tablet Take 1 tablet (0 5 mg total) by mouth as needed in the morning and 1 tablet (0 5 mg total) as needed in the evening for anxiety   60 tablet 2    buPROPion (WELLBUTRIN XL) 300 mg 24 hr tablet Take 1 tablet (300 mg total) by mouth in the morning  30 tablet 2    escitalopram (LEXAPRO) 20 mg tablet Take 1 tablet (20 mg total) by mouth in the morning  30 tablet 2    Aimovig 140 MG/ML SOAJ Inject 140mg under the skin every 30 (thirty) days  1 mL 10    ARIPiprazole (ABILIFY) 2 mg tablet Take 1 tablet (2 mg total) by mouth daily 30 tablet 2    B-D 3CC LUER-DEANN SYR 25GX1" 25G X 1" 3 ML MISC        etonogestrel-ethinyl estradiol (NuvaRing) 0 12-0 015 MG/24HR vaginal ring Insert ring for 21 days then remove for one week  3 each 3    indomethacin (INDOCIN) 25 mg capsule 1 tab BID prn severe headache with food  Hold toradol  30 capsule 0    ketorolac (TORADOL) 10 mg tablet Take 1 tablet (10 mg total) by mouth every 6 (six) hours as needed (migraine) Max 2-3 per week   10 tablet 0    metFORMIN (GLUCOPHAGE-XR) 500 mg 24 hr tablet Take 2 tablets (1,000 mg total) by mouth daily with breakfast 60 tablet 2    olmesartan (BENICAR) 20 mg tablet Take 1 tablet (20 mg total) by mouth daily 30 tablet 0    onabotulinumtoxin A (BOTOX) 100 units Inject as directed      ondansetron (ZOFRAN) 4 mg tablet Take 1 tablet (4 mg total) by mouth every 6 (six) hours 12 tablet 0    Syringe/Needle, Disp, (SYRINGE 3CC/13OS8-2/4") 27G X 1-1/4" 3 ML MISC Use for IM injection of ketorolac  4 each 0     Current Facility-Administered Medications   Medication Dose Route Frequency Provider Last Rate Last Admin    cyanocobalamin injection 1,000 mcg  1,000 mcg Intramuscular Q30 Days Yamil Goodpasture, DO   1,000 mcg at 08/03/20 1455    cyanocobalamin injection 1,000 mcg  1,000 mcg Intramuscular Q14 Days Yamil Goodpasture, DO   1,000 mcg at 05/18/20 1146    cyanocobalamin injection 1,000 mcg  1,000 mcg Intramuscular Q30 Days Yamil Goodpasture, DO   1,000 mcg at 09/01/20 1129        Allergies   Allergen Reactions    Cimzia [Certolizumab Pegol] Swelling    Desvenlafaxine      Other reaction(s): state of confusion    Valproic Acid Other (See Comments)     Other reaction(s): dilated pupils, "schizi"    Tizanidine Anxiety       Review of Systems     Mood Anxiety and Depression   Behavior Normal    Thought Content Disturbing Thoughts, Feelings   General Emotional Problems and Decreased Functioning   Personality Normal   Other Psych Symptoms Normal   Constitutional Negative   ENT Negative   Cardiovascular Negative   Respiratory Negative   Gastrointestinal Negative   Genitourinary Negative   Musculoskeletal Negative   Integumentary Negative   Neurological Negative   Endocrine Normal    Other Symptoms Normal              Laboratory Results: No results found  However, due to the size of the patient record, not all encounters were searched  Please check Results Review for a complete set of results      Substance Abuse History:  Social History     Substance and Sexual Activity   Drug Use Yes    Frequency: 2 0 times per week    Types: Marijuana    Comment: medical marijuana (vape)       Family Psychiatric History:   Family History   Problem Relation Age of Onset    Rheum arthritis Mother     Psoriasis Mother     Other Mother     Hypertension Mother     Diabetes unspecified Mother     Sjogren's syndrome Mother     Alcohol abuse Mother     Drug abuse Mother     Anxiety disorder Father     Alcohol abuse Father     Lung cancer Maternal Grandfather     Cancer Other         bone    Diabetes Other     Other Other         High blood pressure    Depression Maternal Grandmother        The following portions of the patient's history were reviewed and updated as appropriate: allergies, current medications, past family history, past medical history, past social history, past surgical history and problem list     Social History     Socioeconomic History    Marital status: Single     Spouse name: Not on file    Number of children: 0    Years of education: 15    Highest education level: Not on file   Occupational History    Occupation: Unemployed     Employer: [employer]   Tobacco Use    Smoking status: Never Smoker    Smokeless tobacco: Never Used   Vaping Use    Vaping Use: Former    Start date: 7/1/2019    Substances: THC, CBD   Substance and Sexual Activity    Alcohol use: Yes     Comment: rarely    Drug use: Yes     Frequency: 2 0 times per week     Types: Marijuana     Comment: medical marijuana (vape)    Sexual activity: Yes     Partners: Male     Comment: Nuva ring   Other Topics Concern    Not on file   Social History Narrative    Home:  Living with mom and MGM        Education:    Pt denies any h/o learning disability Dxs but admits to having great difficulty in math requiring a   She reached childhood milestones on time as far as he knows  Graduated HS 2009    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 college art classes--she stopped due to anxiety from dissatisfaction with her classes--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 felt they were telling her what to create  On reflection, she feels it was moreso her anxiety as the cause of her leaving school, and she was using the other reason as an excuse so she would not have to admit to anxiety at that time  She is now very open about her anxiety and does not mind that I have this information in the general social section of her chart  Social Determinants of Health     Financial Resource Strain: High Risk    Difficulty of Paying Living Expenses: Very hard   Food Insecurity: Food Insecurity Present    Worried About Running Out of Food in the Last Year: Often true    [place] of Food in the Last Year: Never true   Transportation Needs: No Transportation Needs    Lack of Transportation (Medical): No    Lack of Transportation (Non-Medical):  No   Physical Activity: Not on file   Stress: Not on file   Social Connections: Not on file   Intimate Partner Violence: Not on file   Housing Stability: Not on file     Social History     Social History Narrative    Home:  Living with mom and MGM        Education: Pt denies any h/o learning disability Dxs but admits to having great difficulty in math requiring a   She reached childhood milestones on time as far as he knows  Graduated HS 2009    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 college art classes--she stopped due to anxiety from dissatisfaction with her classes--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 felt they were telling her what to create  On reflection, she feels it was moreso her anxiety as the cause of her leaving school, and she was using the other reason as an excuse so she would not have to admit to anxiety at that time  She is now very open about her anxiety and does not mind that I have this information in the general social section of her chart         Objective:       Mental status:  Appearance calm and cooperative , adequate hygiene and grooming and good eye contact    Mood dysphoric   Affect affect was constricted   Speech a normal rate and fluent   Thought Processes coherent/organized and normal thought processes   Hallucinations no hallucinations present    Thought Content no delusions   Abnormal Thoughts no suicidal thoughts  and no homicidal thoughts    Orientation  oriented to person and place and time   Remote Memory short term memory intact and long term memory intact   Attention Span concentration intact   Intellect Appears to be of Average Intelligence   Insight Limited insight   Judgement judgment was limited   Muscle Strength Muscle strength and tone were normal and Normal gait    Language no difficulty naming common objects and no difficulty repeating a phrase    Fund of Knowledge displays adequate knowledge of current events, adequate fund of knowledge regarding past history and adequate fund of knowledge regarding vocabulary                Assessment/Plan:       Diagnoses and all orders for this visit:    Depression, major, recurrent, moderate (HCC)    Generalized anxiety disorder  -     ALPRAZolam Brendalyn Pilon) 0 5 mg tablet; Take 1 tablet (0 5 mg total) by mouth as needed in the morning and 1 tablet (0 5 mg total) as needed in the evening for anxiety  -     escitalopram (LEXAPRO) 20 mg tablet; Take 1 tablet (20 mg total) by mouth in the morning   -     buPROPion (WELLBUTRIN XL) 300 mg 24 hr tablet; Take 1 tablet (300 mg total) by mouth in the morning  Treatment Recommendations- Risks Benefits      Immediate Medical/Psychiatric/Psychotherapy Treatments and Any Precautions: continue current treatment and increase Wellbutrin XL to 330 mg qam     Risks, Benefits And Possible Side Effects Of Medications:  {PSYCH RISK, BENEFITS AND POSSIBLE SIDE EFFECTS (Optional):82058    Controlled Medication Discussion: Discussed with patient Black Box warning on concurrent use of benzodiazepines and opioid medications including sedation, respiratory depression, coma and death  Patient understands the risk of treatment with benzodiazepines in addition to opioids and wants to continue taking those medications  , Discussed with patient the risks of sedation, respiratory depression, impairment of ability to drive and potential for abuse and addiction related to treatment with benzodiazepine medications  The patient understands risk of treatment with benzodiazepine medications, agrees to not drive if feels impaired and agrees to take medications as prescribed  and The patient has been filling controlled prescriptions on time as prescribed to [facility]       Psychotherapy Provided: Individual psychotherapy provided  Individual psychotherapy provided: Yes  Counseling was provided during the session today for 16 minutes  Medications, treatment progress and treatment plan reviewed with Cruz  Medication education provided to Cruz  Goals discussed during in session: improve control of anxiety and improve control of depression     Recent stressor including COVID-19 issues, relationship problems, health issues, medical problems, limited support, social difficulties, everyday stressors, ongoing anxiety and chronic mental illness discussed with Cruz  Coping strategies including compliance with medications, contacting a therapist, deep/slow breathing, eliminating avoidance, engaging in previously avoided activities, exercising, getting into a good routine, improving self-esteem, increasing energy, increasing interest in usual activities, increasing motivation, increasing social interaction, keeping busy at home, maintain healthy diet, maintain heathy sleeping hygiene and maintain positive attitude reviewed with Cruz  Importance of medication and treatment compliance reviewed with Cruz  Educated on importance of medication and treatment compliance  Importance of follow up with family physician for medical issues reviewed with Cruz  Discussed with Cruz acceptance of mental illness diagnosis and need for ongoing psychiatric treatment  Supportive therapy provided                  I spent 30 minutes directly with the patient during this visit    [address] [facility] [address] verbally agrees to participate in [employer]  Pt is aware that [employer] could be limited without vital signs or the ability to perform a full hands-on physical exam  Cruz Feldman Hiss understands she or the provider may request at any time to terminate the video visit and request the patient to seek care or treatment in person

## 2022-05-19 ENCOUNTER — HOSPITAL ENCOUNTER (EMERGENCY)
Facility: HOSPITAL | Age: 32
Discharge: HOME | End: 2022-05-19
Attending: EMERGENCY MEDICINE
Payer: COMMERCIAL

## 2022-05-19 VITALS
BODY MASS INDEX: 44.29 KG/M2 | SYSTOLIC BLOOD PRESSURE: 153 MMHG | DIASTOLIC BLOOD PRESSURE: 102 MMHG | HEART RATE: 94 BPM | TEMPERATURE: 98.7 F | OXYGEN SATURATION: 98 % | WEIGHT: 250 LBS | RESPIRATION RATE: 18 BRPM

## 2022-05-19 DIAGNOSIS — R11.2 NAUSEA & VOMITING: Primary | ICD-10-CM

## 2022-05-19 DIAGNOSIS — E86.0 DEHYDRATION: ICD-10-CM

## 2022-05-19 LAB
ALBUMIN SERPL BCP-MCNC: 4 G/DL (ref 3.5–5)
ALP SERPL-CCNC: 62 U/L (ref 34–104)
ALT SERPL W P-5'-P-CCNC: 10 U/L (ref 7–52)
ANION GAP SERPL CALCULATED.3IONS-SCNC: 10 MMOL/L (ref 4–13)
APTT PPP: 28 SECONDS (ref 23–37)
AST SERPL W P-5'-P-CCNC: 12 U/L (ref 13–39)
B-HCG SERPL-ACNC: <1 MIU/ML (ref 0–11.6)
BASOPHILS # BLD AUTO: 0.02 THOUSANDS/ΜL (ref 0–0.1)
BASOPHILS NFR BLD AUTO: 0 % (ref 0–1)
BILIRUB SERPL-MCNC: 0.63 MG/DL (ref 0.2–1)
BUN SERPL-MCNC: 5 MG/DL (ref 5–25)
CALCIUM SERPL-MCNC: 9.1 MG/DL (ref 8.4–10.2)
CHLORIDE SERPL-SCNC: 103 MMOL/L (ref 96–108)
CO2 SERPL-SCNC: 22 MMOL/L (ref 21–32)
CREAT SERPL-MCNC: 0.69 MG/DL (ref 0.6–1.3)
EOSINOPHIL # BLD AUTO: 0.17 THOUSAND/ΜL (ref 0–0.61)
EOSINOPHIL NFR BLD AUTO: 2 % (ref 0–6)
ERYTHROCYTE [DISTWIDTH] IN BLOOD BY AUTOMATED COUNT: 14.4 % (ref 11.6–15.1)
GFR SERPL CREATININE-BSD FRML MDRD: 117 ML/MIN/1.73SQ M
GLUCOSE SERPL-MCNC: 90 MG/DL (ref 65–140)
HCT VFR BLD AUTO: 36.3 % (ref 34.8–46.1)
HGB BLD-MCNC: 11.9 G/DL (ref 11.5–15.4)
IMM GRANULOCYTES # BLD AUTO: 0.02 THOUSAND/UL (ref 0–0.2)
IMM GRANULOCYTES NFR BLD AUTO: 0 % (ref 0–2)
INR PPP: 1.03 (ref 0.84–1.19)
LIPASE SERPL-CCNC: 14 U/L (ref 11–82)
LYMPHOCYTES # BLD AUTO: 2.52 THOUSANDS/ΜL (ref 0.6–4.47)
LYMPHOCYTES NFR BLD AUTO: 32 % (ref 14–44)
MAGNESIUM SERPL-MCNC: 2 MG/DL (ref 1.9–2.7)
MCH RBC QN AUTO: 28.9 PG (ref 26.8–34.3)
MCHC RBC AUTO-ENTMCNC: 32.8 G/DL (ref 31.4–37.4)
MCV RBC AUTO: 88 FL (ref 82–98)
MONOCYTES # BLD AUTO: 0.64 THOUSAND/ΜL (ref 0.17–1.22)
MONOCYTES NFR BLD AUTO: 8 % (ref 4–12)
NEUTROPHILS # BLD AUTO: 4.57 THOUSANDS/ΜL (ref 1.85–7.62)
NEUTS SEG NFR BLD AUTO: 58 % (ref 43–75)
NRBC BLD AUTO-RTO: 0 /100 WBCS
PLATELET # BLD AUTO: 412 THOUSANDS/UL (ref 149–390)
PMV BLD AUTO: 8.9 FL (ref 8.9–12.7)
POTASSIUM SERPL-SCNC: 4 MMOL/L (ref 3.5–5.3)
PROT SERPL-MCNC: 7 G/DL (ref 6.4–8.4)
PROTHROMBIN TIME: 13.4 SECONDS (ref 11.6–14.5)
RBC # BLD AUTO: 4.12 MILLION/UL (ref 3.81–5.12)
SODIUM SERPL-SCNC: 135 MMOL/L (ref 135–147)
WBC # BLD AUTO: 7.94 THOUSAND/UL (ref 4.31–10.16)

## 2022-05-19 PROCEDURE — 85025 COMPLETE CBC W/AUTO DIFF WBC: CPT | Performed by: EMERGENCY MEDICINE

## 2022-05-19 PROCEDURE — 96360 HYDRATION IV INFUSION INIT: CPT

## 2022-05-19 PROCEDURE — 96374 THER/PROPH/DIAG INJ IV PUSH: CPT

## 2022-05-19 PROCEDURE — 84702 CHORIONIC GONADOTROPIN TEST: CPT | Performed by: EMERGENCY MEDICINE

## 2022-05-19 PROCEDURE — 36415 COLL VENOUS BLD VENIPUNCTURE: CPT | Performed by: EMERGENCY MEDICINE

## 2022-05-19 PROCEDURE — 85610 PROTHROMBIN TIME: CPT | Performed by: EMERGENCY MEDICINE

## 2022-05-19 PROCEDURE — 99283 EMERGENCY DEPT VISIT LOW MDM: CPT

## 2022-05-19 PROCEDURE — 99284 EMERGENCY DEPT VISIT MOD MDM: CPT | Performed by: EMERGENCY MEDICINE

## 2022-05-19 PROCEDURE — 83690 ASSAY OF LIPASE: CPT | Performed by: EMERGENCY MEDICINE

## 2022-05-19 PROCEDURE — 85730 THROMBOPLASTIN TIME PARTIAL: CPT | Performed by: EMERGENCY MEDICINE

## 2022-05-19 PROCEDURE — 80053 COMPREHEN METABOLIC PANEL: CPT | Performed by: EMERGENCY MEDICINE

## 2022-05-19 PROCEDURE — 83735 ASSAY OF MAGNESIUM: CPT | Performed by: EMERGENCY MEDICINE

## 2022-05-19 RX ORDER — ONDANSETRON 4 MG/1
4 TABLET, FILM COATED ORAL EVERY 8 HOURS PRN
Qty: 12 TABLET | Refills: 0 | Status: SHIPPED | OUTPATIENT
Start: 2022-05-19 | End: 2022-05-27

## 2022-05-19 RX ORDER — ONDANSETRON 2 MG/ML
4 INJECTION INTRAMUSCULAR; INTRAVENOUS ONCE
Status: COMPLETED | OUTPATIENT
Start: 2022-05-19 | End: 2022-05-19

## 2022-05-19 RX ADMIN — SODIUM CHLORIDE 1000 ML: 0.9 INJECTION, SOLUTION INTRAVENOUS at 13:47

## 2022-05-19 RX ADMIN — ONDANSETRON 4 MG: 2 INJECTION INTRAMUSCULAR; INTRAVENOUS at 13:50

## 2022-05-19 NOTE — ED PROVIDER NOTES
History  Chief Complaint   Patient presents with    Vomiting     HPI    This is a 52-year-old female presents the emergency department with chief complaint of vomiting with looser stool times 72 hours  Most recently patient was seen evaluated by her primary care doctor, was treated for possible exposure to a GI warm w/ mebendazole: 100 mg BID x 3 days, pt never took this medication b/c she did not notice any "worms in her stool  "None surgical history significant for cyst removed from her abdominal wall remotely, recently switched to a different birth control and her menses are regular but she is not currently sexually active with the last calendar month, no COVID exposure  No fever chills  No blood in the vomit or diarrhea  No history of IV drug use  Past medical history significant for morbid obesity, protracted nausea vomiting with multiple ER visits in the last 6 months, history depression, history of fibromyalgia  Prior to Admission Medications   Prescriptions Last Dose Informant Patient Reported? Taking? ALPRAZolam (XANAX) 0 5 mg tablet   No No   Sig: Take 1 tablet (0 5 mg total) by mouth as needed in the morning and 1 tablet (0 5 mg total) as needed in the evening for anxiety  ARIPiprazole (ABILIFY) 2 mg tablet  Self No No   Sig: Take 1 tablet (2 mg total) by mouth daily   Aimovig 140 MG/ML SOAJ  Self No No   Sig: Inject 140mg under the skin every 30 (thirty) days  B-D 3CC LUER-DEANN SYR 25GX1" 25G X 1" 3 ML MISC  Self Yes No   Sig:     Syringe/Needle, Disp, (SYRINGE 3CC/85IR9-4/4") 27G X 1-1/4" 3 ML MISC  Self No No   Sig: Use for IM injection of ketorolac  buPROPion (WELLBUTRIN XL) 300 mg 24 hr tablet   No No   Sig: Take 1 tablet (300 mg total) by mouth in the morning     escitalopram (LEXAPRO) 20 mg tablet   No No   Sig: Take 1 tablet (20 mg total) by mouth in the morning    etonogestrel-ethinyl estradiol (NuvaRing) 0 12-0 015 MG/24HR vaginal ring  Self No No   Sig: Insert ring for 21 days then remove for one week  indomethacin (INDOCIN) 25 mg capsule  Self No No   Si tab BID prn severe headache with food  Hold toradol    ketorolac (TORADOL) 10 mg tablet  Self No No   Sig: Take 1 tablet (10 mg total) by mouth every 6 (six) hours as needed (migraine) Max 2-3 per week  metFORMIN (GLUCOPHAGE-XR) 500 mg 24 hr tablet   No No   Sig: Take 2 tablets (1,000 mg total) by mouth daily with breakfast   olmesartan (BENICAR) 20 mg tablet   No No   Sig: Take 1 tablet (20 mg total) by mouth daily   onabotulinumtoxin A (BOTOX) 100 units  Self Yes No   Sig: Inject as directed   ondansetron (ZOFRAN) 4 mg tablet  Self No No   Sig: Take 1 tablet (4 mg total) by mouth every 6 (six) hours      Facility-Administered Medications Last Administration Doses Remaining   cyanocobalamin injection 1,000 mcg 8/3/2020  8:59 AM    cyanocobalamin injection 1,000 mcg 2020 11:46 AM    cyanocobalamin injection 1,000 mcg 2020 11:29 AM           Past Medical History:   Diagnosis Date    Abnormal Pap smear of cervix     Allergic     Anxiety     Arthritis     Depression     Diabetes mellitus (HCC)     Fibromyalgia, primary     Hypertension     IBS (irritable bowel syndrome)     Migraine     Obesity     Vitamin B12 deficiency        Past Surgical History:   Procedure Laterality Date    COLONOSCOPY N/A 2018    Procedure: COLONOSCOPY;  Surgeon: Ananth Crawley MD;  Location: [facility];   Service: Gastroenterology    CT EXC SKIN BENIG >4 CM REMAINDR BODY N/A 2021    Procedure: EXCISION OF PILAR SCALP CYST X 2 AND RIGHT INNER GROIN NEVVUS;  Surgeon: Katie Muñoz MD;  Location: [address] [place] [address] MAIN OR;  Service: Plastics    CT RECMPL WND SCALP,EXTR 2 6-7 5 CM N/A 2021    Procedure: CLOSURE WOUND SCALP X 2 AND RIGHT INNER GROIN;  Surgeon: Katie Muñoz MD;  Location:  MAIN OR;  Service: Plastics    TONSILLECTOMY      WISDOM TOOTH EXTRACTION         Family History   Problem Relation Age of Onset    Rheum arthritis Mother     Psoriasis Mother     Other Mother     Hypertension Mother     Diabetes unspecified Mother     Sjogren's syndrome Mother     Alcohol abuse Mother     Drug abuse Mother     Anxiety disorder Father     Alcohol abuse Father     Lung cancer Maternal Grandfather     Cancer Other         bone    Diabetes Other     Other Other         High blood pressure    Depression Maternal Grandmother      I have reviewed and agree with the history as documented  E-Cigarette/Vaping    E-Cigarette Use Former User     Start Date 7/1/19     Comments 1 cartridge every 2 months       E-Cigarette/Vaping Substances    Nicotine No     THC Yes     CBD Yes     Flavoring No     Other No     Unknown No      Social History     Tobacco Use    Smoking status: Never Smoker    Smokeless tobacco: Never Used   Vaping Use    Vaping Use: Former    Start date: 7/1/2019    Substances: THC, CBD   Substance Use Topics    Alcohol use: Yes     Comment: rarely    Drug use: Yes     Frequency: 2 0 times per week     Types: Marijuana     Comment: medical marijuana (vape)       Review of Systems   Constitutional: Negative  HENT: Negative  Eyes: Negative  Respiratory: Negative for chest tightness and shortness of breath  Gastrointestinal: Positive for diarrhea and vomiting  Negative for abdominal pain  Endocrine: Negative  Genitourinary: Negative  Musculoskeletal: Negative  Skin: Negative  Allergic/Immunologic: Negative  Neurological: Negative  Hematological: Negative  Psychiatric/Behavioral: Negative  Physical Exam  Physical Exam  Vitals and nursing note reviewed  Constitutional:       Appearance: Normal appearance  She is normal weight  HENT:      Head: Normocephalic and atraumatic        Right Ear: External ear normal       Left Ear: External ear normal       Nose: Nose normal       Mouth/Throat:      Mouth: Mucous membranes are moist       Pharynx: Oropharynx is clear    Eyes:      Extraocular Movements: Extraocular movements intact  Conjunctiva/sclera: Conjunctivae normal       Pupils: Pupils are equal, round, and reactive to light  Cardiovascular:      Rate and Rhythm: Normal rate and regular rhythm  Pulses: Normal pulses  Heart sounds: Normal heart sounds  Pulmonary:      Effort: Pulmonary effort is normal       Breath sounds: Normal breath sounds  Abdominal:      General: Abdomen is flat  Bowel sounds are normal  There is no distension  Palpations: Abdomen is soft  There is no mass  Tenderness: There is no abdominal tenderness  There is no right CVA tenderness, left CVA tenderness, guarding or rebound  Hernia: No hernia is present  Musculoskeletal:         General: No swelling or tenderness  Normal range of motion  Cervical back: Normal range of motion  Skin:     General: Skin is warm  Capillary Refill: Capillary refill takes less than 2 seconds  Neurological:      General: No focal deficit present  Mental Status: She is alert and oriented to person, place, and time  Mental status is at baseline  Psychiatric:         Mood and Affect: Mood normal          Behavior: Behavior normal          Thought Content:  Thought content normal          Judgment: Judgment normal          Vital Signs  ED Triage Vitals [05/19/22 1240]   Temperature Pulse Respirations Blood Pressure SpO2   98 7 °F (37 1 °C) 94 18 (!) 153/102 98 %      Temp Source Heart Rate Source Patient Position - Orthostatic VS BP Location FiO2 (%)   Tympanic Monitor Sitting Left arm --      Pain Score       No Pain           Vitals:    05/19/22 1240   BP: (!) 153/102   Pulse: 94   Patient Position - Orthostatic VS: Sitting         Visual Acuity      ED Medications  Medications   sodium chloride 0 9 % bolus 1,000 mL (0 mL Intravenous Stopped 5/19/22 1447)   ondansetron (ZOFRAN) injection 4 mg (4 mg Intravenous Given 5/19/22 1350)       Diagnostic Studies  Results Reviewed     Procedure Component Value Units Date/Time    Comprehensive metabolic panel [[id number]]  (Abnormal) Collected: 05/19/22 1340    Lab Status: Final result Specimen: Blood from Arm, Left Updated: 05/19/22 1411     Sodium 135 mmol/L      Potassium 4 0 mmol/L      Chloride 103 mmol/L      CO2 22 mmol/L      ANION GAP 10 mmol/L      BUN 5 mg/dL      Creatinine 0 69 mg/dL      Glucose 90 mg/dL      Calcium 9 1 mg/dL      AST 12 U/L      ALT 10 U/L      Alkaline Phosphatase 62 U/L      Total Protein 7 0 g/dL      Albumin 4 0 g/dL      Total Bilirubin 0 63 mg/dL      eGFR 117 ml/min/1 73sq m     Narrative:      National Kidney Disease Foundation guidelines for Chronic Kidney Disease (CKD):     Stage 1 with normal or high GFR (GFR > 90 mL/min/1 73 square meters)    Stage 2 Mild CKD (GFR = 60-89 mL/min/1 73 square meters)    Stage 3A Moderate CKD (GFR = 45-59 mL/min/1 73 square meters)    Stage 3B Moderate CKD (GFR = 30-44 mL/min/1 73 square meters)    Stage 4 Severe CKD (GFR = 15-29 mL/min/1 73 square meters)    Stage 5 End Stage CKD (GFR <15 mL/min/1 73 square meters)  Note: GFR calculation is accurate only with a steady state creatinine    Magnesium [[id number]]  (Normal) Collected: 05/19/22 1340    Lab Status: Final result Specimen: Blood from Arm, Left Updated: 05/19/22 1411     Magnesium 2 0 mg/dL     Quantitative hCG [[id number]]  (Normal) Collected: 05/19/22 1340    Lab Status: Final result Specimen: Blood from Arm, Left Updated: 05/19/22 1411     HCG, Quant <1 mIU/mL     Narrative:       Expected Ranges:     Approximate               Approximate HCG  Gestation age          Concentration ( mIU/mL)  _____________          ______________________   Valerio Button                      HCG values  0 2-1                       5-50  1-2                           2-3                         100-5000  3-4                         500-54191  4-5                         1000-71327  5-6 [id number]  6-8                         [id number]  8-12                        73836-199829      Lipase [[id number]]  (Normal) Collected: 05/19/22 1340    Lab Status: Final result Specimen: Blood from Arm, Left Updated: 05/19/22 1403     Lipase 14 u/L     Protime-INR [[id number]]  (Normal) Collected: 05/19/22 1340    Lab Status: Final result Specimen: Blood from Arm, Left Updated: 05/19/22 1359     Protime 13 4 seconds      INR 1 03    APTT [[id number]]  (Normal) Collected: 05/19/22 1340    Lab Status: Final result Specimen: Blood from Arm, Left Updated: 05/19/22 1359     PTT 28 seconds     CBC and differential [[id number]]  (Abnormal) Collected: 05/19/22 1340    Lab Status: Final result Specimen: Blood from Arm, Left Updated: 05/19/22 1347     WBC 7 94 Thousand/uL      RBC 4 12 Million/uL      Hemoglobin 11 9 g/dL      Hematocrit 36 3 %      MCV 88 fL      MCH 28 9 pg      MCHC 32 8 g/dL      RDW 14 4 %      MPV 8 9 fL      Platelets 404 Thousands/uL      nRBC 0 /100 WBCs      Neutrophils Relative 58 %      Immat GRANS % 0 %      Lymphocytes Relative 32 %      Monocytes Relative 8 %      Eosinophils Relative 2 %      Basophils Relative 0 %      Neutrophils Absolute 4 57 Thousands/µL      Immature Grans Absolute 0 02 Thousand/uL      Lymphocytes Absolute 2 52 Thousands/µL      Monocytes Absolute 0 64 Thousand/µL      Eosinophils Absolute 0 17 Thousand/µL      Basophils Absolute 0 02 Thousands/µL                  No orders to display              Procedures  Procedures         ED Course  ED Course as of 05/19/22 1703   Thu May 19, 2022   1305 History and physical completed  26-year-old female not pregnant (self reported) presents with a 72 hour history of nausea vomiting diarrhea  Patient reports that she has had intermittent symptoms over the last month  Patient recently started on lxekizumab, a new biologic tx as per pt for her RA, last month  No focal abdominal pain    Patient does have a special appointment with a GI provider June 20th within this network  SBIRT 22yo+    Flowsheet Row Most Recent Value   SBIRT (23 yo +)    In order to provide better care to our patients, we are screening all of our patients for alcohol and drug use  Would it be okay to ask you these screening questions? Yes Filed at: 05/19/2022 1558   Initial Alcohol Screen: US AUDIT-C     1  How often do you have a drink containing alcohol? 0 Filed at: 05/19/2022 1558   2  How many drinks containing alcohol do you have on a typical day you are drinking? 0 Filed at: 05/19/2022 1558   3b  FEMALE Any Age, or MALE 65+: How often do you have 4 or more drinks on one occassion? 0 Filed at: 05/19/2022 1558   Audit-C Score 0 Filed at: 05/19/2022 1558   BRUCE: How many times in the past year have you    Used an illegal drug or used a prescription medication for non-medical reasons? Never Filed at: 05/19/2022 1558                    MDM  Number of Diagnoses or Management Options  Dehydration  Nausea & vomiting  Diagnosis management comments: 52-year-old female non pregnant presents the emergency department with a history of protracted nausea vomiting symptoms, most recently started 72 hours ago  Patient seen evaluated multiple times in the emergency department for this previously  Patient reports that her symptoms started when she was recently started on a new biologic Ixekizumab, reviewed with patient that according to the medication website patient can have a less than 1% case is a nausea with irritable bowel associated with taking his medication  Due the fact the patient has had this multiple times and has had multiple ER visits related to this, ambulatory referral made on the patient's behalf to GI  Portions of the record may have been created with voice recognition software  Occasional wrong word or "sound a like" substitutions may have occurred due to the inherent limitations of voice recognition software  Read the chart carefully and recognize, using context, where substitutions have occurred  Counseling: I had a detailed discussion with the patient and/or guardian regarding: the historical points, exam findings, and any diagnostic results supporting the discharge diagnosis, lab results, radiology results, discharge instructions reviewed with patient and/or family/caregiver and understanding was verbalized  Instructions given to return to the emergency department if symptoms worsen or persist, or if there are any questions or concerns that arise at home         Amount and/or Complexity of Data Reviewed  Clinical lab tests: ordered and reviewed  Tests in the medicine section of CPT®: ordered and reviewed        Disposition  Final diagnoses:   Nausea & vomiting   Dehydration     Time reflects when diagnosis was documented in both MDM as applicable and the Disposition within this note     Time User Action Codes Description Comment    5/19/2022  4:39 PM Sammy Angry Add [R11 2] Nausea & vomiting     5/19/2022  4:39 PM Sammy Angry Add [E86 0] Dehydration       ED Disposition     ED Disposition   Discharge    Condition   Stable    Date/Time   Thu May 19, 2022  4:38 PM    Comment   Natalie Garza discharge to home/self care                 Follow-up Information     Follow up With Specialties Details Why Cindy Aparicio MD Gastroenterology   [id number]  [address] [place] [address],  Family Medicine   [address]  [address]  [address] [phone]            Patient's Medications   Discharge Prescriptions    ONDANSETRON (ZOFRAN) 4 MG TABLET    Take 1 tablet (4 mg total) by mouth every 8 (eight) hours as needed for nausea or vomiting       Start Date: 5/19/2022 End Date: --       Order Dose: 4 mg       Quantity: 12 tablet    Refills: 0           PDMP Review       Value Time User    PDMP Reviewed  Yes 5/18/2022 10:34 AM Bc Barbosa MD          ED Provider  Electronically Signed by           Bruno Malcolm III,   05/19/22 2468

## 2022-05-21 ENCOUNTER — APPOINTMENT (EMERGENCY)
Dept: CT IMAGING | Facility: HOSPITAL | Age: 32
End: 2022-05-21
Payer: COMMERCIAL

## 2022-05-21 ENCOUNTER — HOSPITAL ENCOUNTER (EMERGENCY)
Facility: HOSPITAL | Age: 32
Discharge: HOME | End: 2022-05-21
Attending: EMERGENCY MEDICINE | Admitting: INTERNAL MEDICINE
Payer: COMMERCIAL

## 2022-05-21 VITALS
HEART RATE: 85 BPM | HEIGHT: 63 IN | DIASTOLIC BLOOD PRESSURE: 87 MMHG | BODY MASS INDEX: 43.41 KG/M2 | SYSTOLIC BLOOD PRESSURE: 149 MMHG | OXYGEN SATURATION: 97 % | RESPIRATION RATE: 16 BRPM | TEMPERATURE: 97.7 F | WEIGHT: 245 LBS

## 2022-05-21 DIAGNOSIS — R11.2 NAUSEA AND VOMITING: Primary | ICD-10-CM

## 2022-05-21 LAB
ALBUMIN SERPL BCP-MCNC: 4 G/DL (ref 3.5–5)
ALP SERPL-CCNC: 58 U/L (ref 34–104)
ALT SERPL W P-5'-P-CCNC: 13 U/L (ref 7–52)
ANION GAP SERPL CALCULATED.3IONS-SCNC: 14 MMOL/L (ref 4–13)
AST SERPL W P-5'-P-CCNC: 18 U/L (ref 13–39)
BACTERIA UR QL AUTO: ABNORMAL /HPF
BASOPHILS # BLD AUTO: 0.03 THOUSANDS/ΜL (ref 0–0.1)
BASOPHILS NFR BLD AUTO: 0 % (ref 0–1)
BILIRUB SERPL-MCNC: 0.71 MG/DL (ref 0.2–1)
BILIRUB UR QL STRIP: ABNORMAL
BUN SERPL-MCNC: 5 MG/DL (ref 5–25)
CALCIUM SERPL-MCNC: 9.4 MG/DL (ref 8.4–10.2)
CHLORIDE SERPL-SCNC: 102 MMOL/L (ref 96–108)
CLARITY UR: CLEAR
CO2 SERPL-SCNC: 21 MMOL/L (ref 21–32)
COLOR UR: YELLOW
CREAT SERPL-MCNC: 0.75 MG/DL (ref 0.6–1.3)
EOSINOPHIL # BLD AUTO: 0.16 THOUSAND/ΜL (ref 0–0.61)
EOSINOPHIL NFR BLD AUTO: 2 % (ref 0–6)
ERYTHROCYTE [DISTWIDTH] IN BLOOD BY AUTOMATED COUNT: 14.5 % (ref 11.6–15.1)
GFR SERPL CREATININE-BSD FRML MDRD: 107 ML/MIN/1.73SQ M
GLUCOSE SERPL-MCNC: 81 MG/DL (ref 65–140)
GLUCOSE UR STRIP-MCNC: NEGATIVE MG/DL
HCT VFR BLD AUTO: 36.7 % (ref 34.8–46.1)
HGB BLD-MCNC: 12 G/DL (ref 11.5–15.4)
HGB UR QL STRIP.AUTO: NEGATIVE
IMM GRANULOCYTES # BLD AUTO: 0.01 THOUSAND/UL (ref 0–0.2)
IMM GRANULOCYTES NFR BLD AUTO: 0 % (ref 0–2)
KETONES UR STRIP-MCNC: ABNORMAL MG/DL
LEUKOCYTE ESTERASE UR QL STRIP: ABNORMAL
LIPASE SERPL-CCNC: 14 U/L (ref 11–82)
LYMPHOCYTES # BLD AUTO: 2.62 THOUSANDS/ΜL (ref 0.6–4.47)
LYMPHOCYTES NFR BLD AUTO: 32 % (ref 14–44)
MAGNESIUM SERPL-MCNC: 2 MG/DL (ref 1.9–2.7)
MCH RBC QN AUTO: 28.7 PG (ref 26.8–34.3)
MCHC RBC AUTO-ENTMCNC: 32.7 G/DL (ref 31.4–37.4)
MCV RBC AUTO: 88 FL (ref 82–98)
MONOCYTES # BLD AUTO: 0.72 THOUSAND/ΜL (ref 0.17–1.22)
MONOCYTES NFR BLD AUTO: 9 % (ref 4–12)
MUCOUS THREADS UR QL AUTO: ABNORMAL
NEUTROPHILS # BLD AUTO: 4.73 THOUSANDS/ΜL (ref 1.85–7.62)
NEUTS SEG NFR BLD AUTO: 57 % (ref 43–75)
NITRITE UR QL STRIP: NEGATIVE
NON-SQ EPI CELLS URNS QL MICRO: ABNORMAL /HPF
NRBC BLD AUTO-RTO: 0 /100 WBCS
PH UR STRIP.AUTO: 6 [PH]
PLATELET # BLD AUTO: 443 THOUSANDS/UL (ref 149–390)
PMV BLD AUTO: 9.1 FL (ref 8.9–12.7)
POTASSIUM SERPL-SCNC: 3.6 MMOL/L (ref 3.5–5.3)
PROT SERPL-MCNC: 7.5 G/DL (ref 6.4–8.4)
PROT UR STRIP-MCNC: NEGATIVE MG/DL
RBC # BLD AUTO: 4.18 MILLION/UL (ref 3.81–5.12)
RBC #/AREA URNS AUTO: ABNORMAL /HPF
SODIUM SERPL-SCNC: 137 MMOL/L (ref 135–147)
SP GR UR STRIP.AUTO: >=1.03 (ref 1–1.03)
UROBILINOGEN UR QL STRIP.AUTO: 0.2 E.U./DL
WBC # BLD AUTO: 8.27 THOUSAND/UL (ref 4.31–10.16)
WBC #/AREA URNS AUTO: ABNORMAL /HPF

## 2022-05-21 PROCEDURE — 96360 HYDRATION IV INFUSION INIT: CPT

## 2022-05-21 PROCEDURE — 81001 URINALYSIS AUTO W/SCOPE: CPT | Performed by: EMERGENCY MEDICINE

## 2022-05-21 PROCEDURE — 96374 THER/PROPH/DIAG INJ IV PUSH: CPT

## 2022-05-21 PROCEDURE — 36415 COLL VENOUS BLD VENIPUNCTURE: CPT | Performed by: EMERGENCY MEDICINE

## 2022-05-21 PROCEDURE — 83690 ASSAY OF LIPASE: CPT | Performed by: EMERGENCY MEDICINE

## 2022-05-21 PROCEDURE — 85025 COMPLETE CBC W/AUTO DIFF WBC: CPT | Performed by: EMERGENCY MEDICINE

## 2022-05-21 PROCEDURE — 80053 COMPREHEN METABOLIC PANEL: CPT | Performed by: EMERGENCY MEDICINE

## 2022-05-21 PROCEDURE — 83735 ASSAY OF MAGNESIUM: CPT | Performed by: EMERGENCY MEDICINE

## 2022-05-21 PROCEDURE — 74176 CT ABD & PELVIS W/O CONTRAST: CPT

## 2022-05-21 PROCEDURE — 99284 EMERGENCY DEPT VISIT MOD MDM: CPT

## 2022-05-21 PROCEDURE — 96361 HYDRATE IV INFUSION ADD-ON: CPT

## 2022-05-21 PROCEDURE — G1004 CDSM NDSC: HCPCS

## 2022-05-21 PROCEDURE — 99284 EMERGENCY DEPT VISIT MOD MDM: CPT | Performed by: EMERGENCY MEDICINE

## 2022-05-21 RX ORDER — METOCLOPRAMIDE HYDROCHLORIDE 5 MG/ML
10 INJECTION INTRAMUSCULAR; INTRAVENOUS ONCE
Status: COMPLETED | OUTPATIENT
Start: 2022-05-21 | End: 2022-05-21

## 2022-05-21 RX ORDER — METOCLOPRAMIDE 10 MG/1
10 TABLET ORAL EVERY 6 HOURS
Qty: 30 TABLET | Refills: 0 | Status: SHIPPED | OUTPATIENT
Start: 2022-05-21

## 2022-05-21 RX ADMIN — METOCLOPRAMIDE HYDROCHLORIDE 10 MG: 5 INJECTION INTRAMUSCULAR; INTRAVENOUS at 14:35

## 2022-05-21 RX ADMIN — SODIUM CHLORIDE 1000 ML: 0.9 INJECTION, SOLUTION INTRAVENOUS at 14:34

## 2022-05-21 NOTE — ED PROVIDER NOTES
History  Chief Complaint   Patient presents with    Nausea    Vomiting     Patient is a 62-year-old female with history of fibromyalgia, irritable bowels syndrome, who presents for evaluation of nausea, vomiting and loose stools  This has been a chronic issue for the patient  She has been seen here 4 times since April for the symptoms  She believes it is due to a biological medication that she started at that time and has since stopped  Patient was seen here 2 days ago for similar symptoms  She had normal blood work and was discharged home  She says that she has a GI appointment next month  She says that the symptoms are similar to what she has been experiencing  She says she is not eat or drink anything in 2 days    She denies any significant abdominal pain  She denies any fevers, chills  Prior to Admission Medications   Prescriptions Last Dose Informant Patient Reported? Taking? ALPRAZolam (XANAX) 0 5 mg tablet   No No   Sig: Take 1 tablet (0 5 mg total) by mouth as needed in the morning and 1 tablet (0 5 mg total) as needed in the evening for anxiety  ARIPiprazole (ABILIFY) 2 mg tablet  Self No No   Sig: Take 1 tablet (2 mg total) by mouth daily   Aimovig 140 MG/ML SOAJ  Self No No   Sig: Inject 140mg under the skin every 30 (thirty) days  B-D 3CC LUER-DEANN SYR 25GX1" 25G X 1" 3 ML MISC  Self Yes No   Sig:     Syringe/Needle, Disp, (SYRINGE 3CC/60CL1-8/4") 27G X 1-1/4" 3 ML MISC  Self No No   Sig: Use for IM injection of ketorolac  buPROPion (WELLBUTRIN XL) 300 mg 24 hr tablet   No No   Sig: Take 1 tablet (300 mg total) by mouth in the morning  escitalopram (LEXAPRO) 20 mg tablet   No No   Sig: Take 1 tablet (20 mg total) by mouth in the morning    etonogestrel-ethinyl estradiol (NuvaRing) 0 12-0 015 MG/24HR vaginal ring  Self No No   Sig: Insert ring for 21 days then remove for one week     indomethacin (INDOCIN) 25 mg capsule  Self No No   Si tab BID prn severe headache with food  Hold toradol    ketorolac (TORADOL) 10 mg tablet  Self No No   Sig: Take 1 tablet (10 mg total) by mouth every 6 (six) hours as needed (migraine) Max 2-3 per week  metFORMIN (GLUCOPHAGE-XR) 500 mg 24 hr tablet   No No   Sig: Take 2 tablets (1,000 mg total) by mouth daily with breakfast   olmesartan (BENICAR) 20 mg tablet   No No   Sig: Take 1 tablet (20 mg total) by mouth daily   onabotulinumtoxin A (BOTOX) 100 units  Self Yes No   Sig: Inject as directed   ondansetron (ZOFRAN) 4 mg tablet  Self No No   Sig: Take 1 tablet (4 mg total) by mouth every 6 (six) hours   ondansetron (ZOFRAN) 4 mg tablet   No No   Sig: Take 1 tablet (4 mg total) by mouth every 8 (eight) hours as needed for nausea or vomiting      Facility-Administered Medications Last Administration Doses Remaining   cyanocobalamin injection 1,000 mcg 8/3/2020  8:59 AM    cyanocobalamin injection 1,000 mcg 5/18/2020 11:46 AM    cyanocobalamin injection 1,000 mcg 9/1/2020 11:29 AM           Past Medical History:   Diagnosis Date    Abnormal Pap smear of cervix     Allergic     Anxiety     Arthritis     Depression     Diabetes mellitus (HCC)     Fibromyalgia, primary     Hypertension     IBS (irritable bowel syndrome)     Migraine     Obesity     Vitamin B12 deficiency        Past Surgical History:   Procedure Laterality Date    COLONOSCOPY N/A 5/8/2018    Procedure: COLONOSCOPY;  Surgeon: Pearla Kocher, MD;  Location: [facility] GI [facility];   Service: Gastroenterology    OR EXC SKIN BENIG >4 CM REMAINDR BODY N/A 7/1/2021    Procedure: EXCISION OF PILAR SCALP CYST X 2 AND RIGHT INNER GROIN NEVVUS;  Surgeon: Jesse Alba MD;  Location: [address] [place] [address] MAIN OR;  Service: Plastics    OR RECMPL WND SCALP,EXTR 2 6-7 5 CM N/A 7/1/2021    Procedure: CLOSURE WOUND SCALP X 2 AND RIGHT INNER GROIN;  Surgeon: Jesse Alba MD;  Location:  MAIN OR;  Service: Plastics    TONSILLECTOMY      WISDOM TOOTH EXTRACTION         Family History   Problem Relation Age of Onset    Rheum arthritis Mother     Psoriasis Mother     Other Mother     Hypertension Mother     Diabetes unspecified Mother     Sjogren's syndrome Mother     Alcohol abuse Mother     Drug abuse Mother     Anxiety disorder Father     Alcohol abuse Father     Lung cancer Maternal Grandfather     Cancer Other         bone    Diabetes Other     Other Other         High blood pressure    Depression Maternal Grandmother      I have reviewed and agree with the history as documented  E-Cigarette/Vaping    E-Cigarette Use Former User     Start Date 7/1/19     Comments 1 cartridge every 2 months       E-Cigarette/Vaping Substances    Nicotine No     THC Yes     CBD Yes     Flavoring No     Other No     Unknown No      Social History     Tobacco Use    Smoking status: Never Smoker    Smokeless tobacco: Never Used   Vaping Use    Vaping Use: Former    Start date: 7/1/2019    Substances: THC, CBD   Substance Use Topics    Alcohol use: Yes     Comment: rarely    Drug use: Not Currently     Frequency: 2 0 times per week     Comment: medical marijuana (vape)       Review of Systems   Constitutional: Negative for chills, diaphoresis and fever  HENT: Negative for congestion, sinus pressure, sore throat and trouble swallowing  Eyes: Negative for pain, discharge and itching  Respiratory: Negative for cough, chest tightness, shortness of breath and wheezing  Cardiovascular: Negative for chest pain, palpitations and leg swelling  Gastrointestinal: Positive for nausea and vomiting  Negative for abdominal distention, abdominal pain, blood in stool and diarrhea  Endocrine: Negative for polyphagia and polyuria  Genitourinary: Negative for difficulty urinating, dysuria, flank pain, hematuria, pelvic pain and vaginal bleeding  Musculoskeletal: Negative for arthralgias and back pain  Skin: Negative for rash     Neurological: Negative for dizziness, syncope, weakness, light-headedness and headaches  Physical Exam  Physical Exam  Vitals and nursing note reviewed  Constitutional:       General: She is not in acute distress  Appearance: She is well-developed  HENT:      Head: Normocephalic and atraumatic  Right Ear: External ear normal       Left Ear: External ear normal       Nose: Nose normal       Mouth/Throat:      Mouth: Mucous membranes are moist       Pharynx: No oropharyngeal exudate  Eyes:      Conjunctiva/sclera: Conjunctivae normal       Pupils: Pupils are equal, round, and reactive to light  Cardiovascular:      Rate and Rhythm: Normal rate and regular rhythm  Heart sounds: Normal heart sounds  No murmur heard  No friction rub  No gallop  Pulmonary:      Effort: Pulmonary effort is normal  No respiratory distress  Breath sounds: Normal breath sounds  No wheezing or rales  Abdominal:      General: There is no distension  Palpations: Abdomen is soft  Tenderness: There is no abdominal tenderness  There is no guarding  Musculoskeletal:         General: No swelling, tenderness or deformity  Normal range of motion  Cervical back: Normal range of motion and neck supple  Lymphadenopathy:      Cervical: No cervical adenopathy  Skin:     General: Skin is warm and dry  Neurological:      General: No focal deficit present  Mental Status: She is alert and oriented to person, place, and time  Mental status is at baseline  Cranial Nerves: No cranial nerve deficit  Sensory: No sensory deficit  Motor: No weakness or abnormal muscle tone        Coordination: Coordination normal          Vital Signs  ED Triage Vitals [05/21/22 1339]   Temperature Pulse Respirations Blood Pressure SpO2   97 7 °F (36 5 °C) 96 16 149/87 97 %      Temp Source Heart Rate Source Patient Position - Orthostatic VS BP Location FiO2 (%)   Temporal Monitor -- Left arm --      Pain Score       No Pain           Vitals:    05/21/22 1339 05/21/22 1500   BP: 149/87    Pulse: 96 85         Visual Acuity      ED Medications  Medications   sodium chloride 0 9 % bolus 1,000 mL (0 mL Intravenous Stopped 5/21/22 1724)   metoclopramide (REGLAN) injection 10 mg (10 mg Intravenous Given 5/21/22 1435)       Diagnostic Studies  Results Reviewed     Procedure Component Value Units Date/Time    Urine Microscopic [[id number]]  (Abnormal) Collected: 05/21/22 1626    Lab Status: Final result Specimen: Urine, Clean Catch Updated: 05/21/22 1707     RBC, UA None Seen /hpf      WBC, UA 10-20 /hpf      Epithelial Cells Moderate /hpf      Bacteria, UA Moderate /hpf      MUCUS THREADS Moderate    UA (URINE) with reflex to Scope [[id number]]  (Abnormal) Collected: 05/21/22 1626    Lab Status: Final result Specimen: Urine, Clean Catch Updated: 05/21/22 1634     Color, UA Yellow     Clarity, UA Clear     Specific Gravity, UA >=1 030     pH, UA 6 0     Leukocytes, UA Trace     Nitrite, UA Negative     Protein, UA Negative mg/dl      Glucose, UA Negative mg/dl      Ketones, UA 80 (3+) mg/dl      Urobilinogen, UA 0 2 E U /dl      Bilirubin, UA 1+     Blood, UA Negative    Comprehensive metabolic panel [[id number]]  (Abnormal) Collected: 05/21/22 1437    Lab Status: Final result Specimen: Blood from Arm, Left Updated: 05/21/22 1514     Sodium 137 mmol/L      Potassium 3 6 mmol/L      Chloride 102 mmol/L      CO2 21 mmol/L      ANION GAP 14 mmol/L      BUN 5 mg/dL      Creatinine 0 75 mg/dL      Glucose 81 mg/dL      Calcium 9 4 mg/dL      AST 18 U/L      ALT 13 U/L      Alkaline Phosphatase 58 U/L      Total Protein 7 5 g/dL      Albumin 4 0 g/dL      Total Bilirubin 0 71 mg/dL      eGFR 107 ml/min/1 73sq m     Narrative:      [place] guidelines for Chronic Kidney Disease (CKD):     Stage 1 with normal or high GFR (GFR > 90 mL/min/1 73 square meters)    Stage 2 Mild CKD (GFR = 60-89 mL/min/1 73 square meters)    Stage 3A Moderate CKD (GFR = 45-59 mL/min/1 73 square meters)    Stage 3B Moderate CKD (GFR = 30-44 mL/min/1 73 square meters)    Stage 4 Severe CKD (GFR = 15-29 mL/min/1 73 square meters)    Stage 5 End Stage CKD (GFR <15 mL/min/1 73 square meters)  Note: GFR calculation is accurate only with a steady state creatinine    Lipase [[id number]]  (Normal) Collected: 05/21/22 1437    Lab Status: Final result Specimen: Blood from Arm, Left Updated: 05/21/22 1514     Lipase 14 u/L     Magnesium [[id number]]  (Normal) Collected: 05/21/22 1437    Lab Status: Final result Specimen: Blood from Arm, Left Updated: 05/21/22 1514     Magnesium 2 0 mg/dL     CBC and differential [[id number]]  (Abnormal) Collected: 05/21/22 1437    Lab Status: Final result Specimen: Blood from Arm, Left Updated: 05/21/22 1446     WBC 8 27 Thousand/uL      RBC 4 18 Million/uL      Hemoglobin 12 0 g/dL      Hematocrit 36 7 %      MCV 88 fL      MCH 28 7 pg      MCHC 32 7 g/dL      RDW 14 5 %      MPV 9 1 fL      Platelets 305 Thousands/uL      nRBC 0 /100 WBCs      Neutrophils Relative 57 %      Immat GRANS % 0 %      Lymphocytes Relative 32 %      Monocytes Relative 9 %      Eosinophils Relative 2 %      Basophils Relative 0 %      Neutrophils Absolute 4 73 Thousands/µL      Immature Grans Absolute 0 01 Thousand/uL      Lymphocytes Absolute 2 62 Thousands/µL      Monocytes Absolute 0 72 Thousand/µL      Eosinophils Absolute 0 16 Thousand/µL      Basophils Absolute 0 03 Thousands/µL                  CT abdomen pelvis wo contrast   Final Result by Shilpa Tay MD (05/21 1531)      No acute intra-abdominal inflammatory process  Workstation performed: [id number]                    Procedures  Procedures         ED Course  ED Course as of 05/22/22 1501   Sat May 21, 2022   1600 Patient feeling better after IV fluids and Reglan                                               MDM  Number of Diagnoses or Management Options  Nausea and vomiting  Diagnosis management comments: 29-year-old female presenting for nausea, vomiting  Has been an ongoing issue over the past 2 months  This is her 4th visit since April  Says she has had difficulty tolerating p o  over the last 2 days  Denies any worsening abdominal pain  Scheduled to see GI next month  Vitals within normal limits  Will obtain belly labs, CT abdomen pelvis, will give IV fluids, Reglan  Labs and imaging within normal limits  Patient feeling better after meds, given script for Reglan for home  Disposition  Final diagnoses:   Nausea and vomiting     Time reflects when diagnosis was documented in both MDM as applicable and the Disposition within this note     Time User Action Codes Description Comment    5/21/2022  4:01 PM [place] Add [R11 2] Nausea and vomiting       ED Disposition     ED Disposition   Discharge    Condition   Stable    Date/Time   Sat May 21, 2022  4:01 PM    Comment   Pina Canela discharge to home/self care                 Follow-up Information     Follow up With Specialties Details Why Contact Info Additional Information    Teresa Kirby DO Family Medicine Schedule an appointment as soon as possible for a visit  For follow up of symptoms [place] [address] [facility] Schedule an appointment as soon as possible for a visit  For follow up of symptoms [address] [address]  Arnav Breen [address] [facility], [address] [facility], [place], [facility] [place], [place], [address], [phone]          Discharge Medication List as of 5/21/2022  4:35 PM      START taking these medications    Details   metoclopramide (Reglan) 10 mg tablet Take 1 tablet (10 mg total) by mouth every 6 (six) hours, Starting Sat 5/21/2022, Normal         CONTINUE these medications which have NOT CHANGED    Details   Aimovig 140 MG/ML SOAJ Inject 140mg under the skin every 30 (thirty) days , Normal      ALPRAZolam (XANAX) 0 5 mg tablet Take 1 tablet (0 5 mg total) by mouth as needed in the morning and 1 tablet (0 5 mg total) as needed in the evening for anxiety  , Starting Wed 5/18/2022, Normal      ARIPiprazole (ABILIFY) 2 mg tablet Take 1 tablet (2 mg total) by mouth daily, Starting Tue 5/10/2022, Normal      !! B-D 3CC LUER-DEANN SYR 25GX1" 25G X 1" 3 ML MISC  , Starting Thu 7/26/2018, Historical Med      buPROPion (WELLBUTRIN XL) 300 mg 24 hr tablet Take 1 tablet (300 mg total) by mouth in the morning , Starting Wed 5/18/2022, Normal      escitalopram (LEXAPRO) 20 mg tablet Take 1 tablet (20 mg total) by mouth in the morning , Starting Wed 5/18/2022, Normal      etonogestrel-ethinyl estradiol (NuvaRing) 0 12-0 015 MG/24HR vaginal ring Insert ring for 21 days then remove for one week , Normal      indomethacin (INDOCIN) 25 mg capsule 1 tab BID prn severe headache with food  Hold toradol , Normal      ketorolac (TORADOL) 10 mg tablet Take 1 tablet (10 mg total) by mouth every 6 (six) hours as needed (migraine) Max 2-3 per week , Starting Tue 3/22/2022, Normal      metFORMIN (GLUCOPHAGE-XR) 500 mg 24 hr tablet Take 2 tablets (1,000 mg total) by mouth daily with breakfast, Starting Tue 5/10/2022, Normal      olmesartan (BENICAR) 20 mg tablet Take 1 tablet (20 mg total) by mouth daily, Starting Tue 5/17/2022, Normal      onabotulinumtoxin A (BOTOX) 100 units Inject as directed, Starting Tue 8/29/2017, Historical Med      !! ondansetron (ZOFRAN) 4 mg tablet Take 1 tablet (4 mg total) by mouth every 6 (six) hours, Starting Tue 4/19/2022, Normal      !! ondansetron (ZOFRAN) 4 mg tablet Take 1 tablet (4 mg total) by mouth every 8 (eight) hours as needed for nausea or vomiting, Starting Thu 5/19/2022, Print      !! Syringe/Needle, Disp, (SYRINGE 3CC/20EL9-7/4") 27G X 1-1/4" 3 ML MISC Use for IM injection of ketorolac , Normal       !! - Potential duplicate medications found  Please discuss with provider  No discharge procedures on file      PDMP Review       Value Time User    PDMP Reviewed  Yes 5/18/2022 10:34 AM Nestor Mtz MD          ED Provider  Electronically Signed by           Jany Baltazar DO  05/22/22 3022

## 2022-05-21 NOTE — ED NOTES
POC PREG /LOT [id number] EXP 07/31/2023 Negative RN and and MD made aware       Sri Lias  05/21/22 03 91 12 17 13

## 2022-05-23 ENCOUNTER — OFFICE VISIT (OUTPATIENT)
Dept: FAMILY MEDICINE CLINIC | Facility: CLINIC | Age: 32
End: 2022-05-23
Payer: COMMERCIAL

## 2022-05-23 ENCOUNTER — TELEPHONE (OUTPATIENT)
Dept: PSYCHIATRY | Facility: CLINIC | Age: 32
End: 2022-05-23

## 2022-05-23 VITALS
TEMPERATURE: 97.3 F | DIASTOLIC BLOOD PRESSURE: 88 MMHG | HEIGHT: 63 IN | WEIGHT: 248 LBS | OXYGEN SATURATION: 98 % | SYSTOLIC BLOOD PRESSURE: 122 MMHG | RESPIRATION RATE: 18 BRPM | BODY MASS INDEX: 43.94 KG/M2 | HEART RATE: 96 BPM

## 2022-05-23 DIAGNOSIS — I10 PRIMARY HYPERTENSION: ICD-10-CM

## 2022-05-23 DIAGNOSIS — R11.2 NON-INTRACTABLE VOMITING WITH NAUSEA: ICD-10-CM

## 2022-05-23 DIAGNOSIS — K59.1 FUNCTIONAL DIARRHEA: Primary | ICD-10-CM

## 2022-05-23 PROCEDURE — 99214 OFFICE O/P EST MOD 30 MIN: CPT | Performed by: FAMILY MEDICINE

## 2022-05-23 RX ORDER — OLMESARTAN MEDOXOMIL 20 MG/1
20 TABLET ORAL DAILY
Qty: 30 TABLET | Refills: 2 | Status: SHIPPED | OUTPATIENT
Start: 2022-05-23

## 2022-05-23 RX ORDER — DIPHENOXYLATE HYDROCHLORIDE AND ATROPINE SULFATE 2.5; .025 MG/1; MG/1
TABLET ORAL
Qty: 30 TABLET | Refills: 1 | Status: SHIPPED | OUTPATIENT
Start: 2022-05-23

## 2022-05-23 NOTE — TELEPHONE ENCOUNTER
Patient was informed that appt on 5/31/2022 needed to be cancelled due to a provider meeting  Patient asked for a call back to r/s sooner appt than next follow up on 6/17/2022

## 2022-05-23 NOTE — PROGRESS NOTES
Assessment/Plan:  CT abdomen pelvis reviewed at this time  Magnesium lipase CMP CBC reviewed from May 21st   Patient will stop metformin as directed  Patient see GI on June 20th  Patient go for stool analysis as noted below  Patient will continue with Benicar for hypertension  Refills given at this time  Patient use Zofran as needed for nausea vomiting and will   Try Reglan as directed  Patient use Lomotil as needed for diarrhea  Diagnoses and all orders for this visit:    Functional diarrhea  -     diphenoxylate-atropine (LOMOTIL) 2 5-0 025 mg per tablet; 1 tablet 4 times daily as needed for diarrhea  -     Stool culture; Future  -     White Blood Cells, Stool by Gram Stain; Future  -     Clostridium difficile toxin by PCR; Future  -     Giardia, EIA, Ova/Parasite; Future    Primary hypertension  -     olmesartan (BENICAR) 20 mg tablet; Take 1 tablet (20 mg total) by mouth in the morning  Non-intractable vomiting with nausea            Subjective:        Patient ID: Judi Pérez is a 28 y o  female  Patient follow-up on hypertension as well as diarrhea nausea and vomiting  Patient status post ER and needed IV fluids as well as Zofran  Patient not using anything for the diarrhea  Patient's diarrhea is watery  No blood in the stool  No abdominal pain or chest pain shortness of breath or fevers or chills associated with this  The following portions of the patient's history were reviewed and updated as appropriate: allergies, current medications, past family history, past medical history, past social history, past surgical history and problem list       Review of Systems   Constitutional: Negative  HENT: Negative  Eyes: Negative  Respiratory: Negative  Cardiovascular: Negative  Gastrointestinal: Positive for diarrhea, nausea and vomiting  Endocrine: Negative  Genitourinary: Negative  Musculoskeletal: Negative  Skin: Negative  Allergic/Immunologic: Negative  Neurological: Negative  Hematological: Negative  Psychiatric/Behavioral: Negative  Objective:               /88 (BP Location: Right arm, Patient Position: Sitting, Cuff Size: Large)   Pulse 96   Temp (!) 97 3 °F (36 3 °C) (Tympanic)   Resp 18   Ht 5' 3" (1 6 m)   Wt 112 kg (248 lb)   SpO2 98%   BMI 43 93 kg/m²          Physical Exam  Vitals and nursing note reviewed  Constitutional:       General: She is not in acute distress  Appearance: Normal appearance  She is not ill-appearing, toxic-appearing or diaphoretic  HENT:      Head: Normocephalic and atraumatic  Right Ear: Tympanic membrane, ear canal and external ear normal  There is no impacted cerumen  Left Ear: Tympanic membrane, ear canal and external ear normal  There is no impacted cerumen  Mouth/Throat:      Pharynx: No posterior oropharyngeal erythema  Eyes:      General: No scleral icterus  Right eye: No discharge  Left eye: No discharge  Extraocular Movements: Extraocular movements intact  Conjunctiva/sclera: Conjunctivae normal       Pupils: Pupils are equal, round, and reactive to light  Neck:      Vascular: No carotid bruit  Cardiovascular:      Rate and Rhythm: Normal rate and regular rhythm  Pulses: Normal pulses  Heart sounds: Normal heart sounds  No murmur heard  No friction rub  No gallop  Pulmonary:      Effort: Pulmonary effort is normal  No respiratory distress  Breath sounds: Normal breath sounds  No stridor  No wheezing, rhonchi or rales  Chest:      Chest wall: No tenderness  Abdominal:      General: Abdomen is flat  Bowel sounds are normal  There is no distension  Palpations: Abdomen is soft  Tenderness: There is no abdominal tenderness  There is no guarding or rebound  Musculoskeletal:         General: No swelling, tenderness, deformity or signs of injury  Normal range of motion        Cervical back: Normal range of motion and neck supple  No rigidity  No muscular tenderness  Right lower leg: No edema  Left lower leg: No edema  Lymphadenopathy:      Cervical: No cervical adenopathy  Skin:     General: Skin is warm and dry  Capillary Refill: Capillary refill takes less than 2 seconds  Coloration: Skin is not jaundiced  Findings: No bruising, erythema, lesion or rash  Neurological:      Mental Status: She is alert and oriented to person, place, and time  Mental status is at baseline  Cranial Nerves: No cranial nerve deficit  Sensory: No sensory deficit  Motor: No weakness  Coordination: Coordination normal       Gait: Gait normal    Psychiatric:         Mood and Affect: Mood normal          Behavior: Behavior normal          Thought Content:  Thought content normal          Judgment: Judgment normal

## 2022-05-27 ENCOUNTER — PREP FOR PROCEDURE (OUTPATIENT)
Dept: GASTROENTEROLOGY | Facility: CLINIC | Age: 32
End: 2022-05-27

## 2022-05-27 ENCOUNTER — TELEMEDICINE (OUTPATIENT)
Dept: BEHAVIORAL/MENTAL HEALTH CLINIC | Facility: CLINIC | Age: 32
End: 2022-05-27
Payer: COMMERCIAL

## 2022-05-27 ENCOUNTER — OFFICE VISIT (OUTPATIENT)
Dept: GASTROENTEROLOGY | Facility: CLINIC | Age: 32
End: 2022-05-27
Payer: COMMERCIAL

## 2022-05-27 VITALS
WEIGHT: 249 LBS | TEMPERATURE: 98.9 F | BODY MASS INDEX: 44.12 KG/M2 | DIASTOLIC BLOOD PRESSURE: 90 MMHG | HEIGHT: 63 IN | SYSTOLIC BLOOD PRESSURE: 120 MMHG

## 2022-05-27 DIAGNOSIS — R10.32 LEFT LOWER QUADRANT ABDOMINAL PAIN: ICD-10-CM

## 2022-05-27 DIAGNOSIS — K21.9 GASTROESOPHAGEAL REFLUX DISEASE WITHOUT ESOPHAGITIS: ICD-10-CM

## 2022-05-27 DIAGNOSIS — R11.10 VOMITING, UNSPECIFIED VOMITING TYPE, UNSPECIFIED WHETHER NAUSEA PRESENT: Primary | ICD-10-CM

## 2022-05-27 DIAGNOSIS — R11.2 NAUSEA AND VOMITING: ICD-10-CM

## 2022-05-27 DIAGNOSIS — F33.1 DEPRESSION, MAJOR, RECURRENT, MODERATE (HCC): Primary | Chronic | ICD-10-CM

## 2022-05-27 DIAGNOSIS — R11.0 NAUSEA: ICD-10-CM

## 2022-05-27 DIAGNOSIS — F41.1 GENERALIZED ANXIETY DISORDER: Chronic | ICD-10-CM

## 2022-05-27 DIAGNOSIS — R11.2 NAUSEA & VOMITING: Primary | ICD-10-CM

## 2022-05-27 DIAGNOSIS — Z30.44 ENCOUNTER FOR SURVEILLANCE OF VAGINAL RING HORMONAL CONTRACEPTIVE DEVICE: ICD-10-CM

## 2022-05-27 DIAGNOSIS — K59.1 FUNCTIONAL DIARRHEA: ICD-10-CM

## 2022-05-27 DIAGNOSIS — E86.0 DEHYDRATION: ICD-10-CM

## 2022-05-27 PROCEDURE — 99244 OFF/OP CNSLTJ NEW/EST MOD 40: CPT | Performed by: INTERNAL MEDICINE

## 2022-05-27 PROCEDURE — 90834 PSYTX W PT 45 MINUTES: CPT | Performed by: SOCIAL WORKER

## 2022-05-27 RX ORDER — ONDANSETRON 4 MG/1
4 TABLET, FILM COATED ORAL EVERY 6 HOURS
Qty: 30 TABLET | Refills: 2 | Status: SHIPPED | OUTPATIENT
Start: 2022-05-27

## 2022-05-27 RX ORDER — PANTOPRAZOLE SODIUM 40 MG/1
40 TABLET, DELAYED RELEASE ORAL DAILY
Qty: 30 TABLET | Refills: 3 | Status: SHIPPED | OUTPATIENT
Start: 2022-05-27

## 2022-05-27 RX ORDER — DICYCLOMINE HCL 20 MG
20 TABLET ORAL 2 TIMES DAILY
Qty: 60 TABLET | Refills: 1 | Status: SHIPPED | OUTPATIENT
Start: 2022-05-27

## 2022-05-27 NOTE — TELEPHONE ENCOUNTER
Patient called about a refill for her birth control  Pharmacy on file and she has yearly scheduled for 07/19/22  Please review when you get a chance   Thank you

## 2022-05-31 RX ORDER — ETONOGESTREL AND ETHINYL ESTRADIOL 11.7; 2.7 MG/1; MG/1
INSERT, EXTENDED RELEASE VAGINAL
Qty: 3 EACH | Refills: 0 | Status: SHIPPED | OUTPATIENT
Start: 2022-05-31 | End: 2022-07-18 | Stop reason: SDUPTHER

## 2022-06-01 NOTE — H&P (VIEW-ONLY)
[facility] - Outpatient Follow-up Note  Judi Pérez 28 y o  female MRN: [id number]  Encounter: [id number]          ASSESSMENT AND PLAN:      1  Nausea & vomiting  2  Dehydration  3  Left lower quadrant abdominal pain  4  Functional diarrhea  6  Gastroesophageal reflux disease without esophagitis      -EGD  -gastric emptying study  -lomotil as needed  -bentyl  Reglan for nausea  Reflux precautions  Stool test - enteric panel and fecal calprotectin    Follow up in few months     ______________________________________________________________________    SUBJECTIVE:  28-year-old female here for evaluation of multiple GI symptoms including nausea, vomiting, intermittent diarrhea and abdominal pain  She reports fullness, bloating and diarrhea  No melena or hematochezia  No weight loss  No dysphagia    She has inflammatory arthropathy and follows with rheumatologist through [facility]   Her CRP and sed rate is elevated  She has thrombocytopenia  Previous treatments include Plaquenil and sulfasalazine  Intermittently required steroids  Previous biologic treatment include Humira, Imuran   Recently tried cimzia and Ixekizumab but discontinued due to Gi side effects  She had normal colonoscopy in 2018  Benicar started recently -    REVIEW OF SYSTEMS IS OTHERWISE NEGATIVE  Historical Information   Past Medical History:   Diagnosis Date    Abnormal Pap smear of cervix     Allergic     Anxiety     Arthritis     Depression     Diabetes mellitus (HCC)     Fibromyalgia, primary     Hypertension     IBS (irritable bowel syndrome)     Migraine     Obesity     Vitamin B12 deficiency      Past Surgical History:   Procedure Laterality Date    COLONOSCOPY N/A 5/8/2018    Procedure: COLONOSCOPY;  Surgeon: Grace Huang MD;  Location: [facility] GI LAB;   Service: Gastroenterology    AR EXC SKIN BENIG >4 CM REMAINDR BODY N/A 7/1/2021    Procedure: EXCISION OF PILAR SCALP CYST X 2 AND RIGHT INNER GROIN NEVVUS;  Surgeon: Zena Alves MD;  Location: [facility] MAIN OR;  Service: Plastics    NC RECMPL WND SCALP,EXTR 2 6-7 5 CM N/A 7/1/2021    Procedure: CLOSURE WOUND SCALP X 2 AND RIGHT INNER GROIN;  Surgeon: Zena Alves MD;  Location: [facility] MAIN OR;  Service: Plastics    TONSILLECTOMY      WISDOM TOOTH EXTRACTION       Social History   Social History     Substance and Sexual Activity   Alcohol Use Yes    Comment: rarely     Social History     Substance and Sexual Activity   Drug Use Not Currently    Frequency: 2 0 times per week    Comment: medical marijuana (vape)     Social History     Tobacco Use   Smoking Status Never Smoker   Smokeless Tobacco Never Used     Family History   Problem Relation Age of Onset    Rheum arthritis Mother     Psoriasis Mother     Other Mother     Hypertension Mother     Diabetes unspecified Mother     Sjogren's syndrome Mother     Alcohol abuse Mother     Drug abuse Mother     Anxiety disorder Father     Alcohol abuse Father     Lung cancer Maternal Grandfather     Cancer Other         bone    Diabetes Other     Other Other         High blood pressure    Depression Maternal Grandmother        Meds/Allergies       Current Outpatient Medications:     Aimovig 140 MG/ML SOAJ    ALPRAZolam (XANAX) 0 5 mg tablet    ARIPiprazole (ABILIFY) 2 mg tablet    B-D 3CC LUER-DEANN SYR 25GX1" 25G X 1" 3 ML MISC    buPROPion (WELLBUTRIN XL) 300 mg 24 hr tablet    dicyclomine (BENTYL) 20 mg tablet    diphenoxylate-atropine (LOMOTIL) 2 5-0 025 mg per tablet    escitalopram (LEXAPRO) 20 mg tablet    indomethacin (INDOCIN) 25 mg capsule    ketorolac (TORADOL) 10 mg tablet    metoclopramide (Reglan) 10 mg tablet    olmesartan (BENICAR) 20 mg tablet    onabotulinumtoxin A (BOTOX) 100 units    ondansetron (ZOFRAN) 4 mg tablet    pantoprazole (PROTONIX) 40 mg tablet    Syringe/Needle, Disp, (SYRINGE 3CC/19KE2-8/4") 27G X 1-1/4" 3 ML MISC   etonogestrel-ethinyl estradiol (NuvaRing) 0 12-0 015 MG/24HR vaginal ring    metFORMIN (GLUCOPHAGE-XR) 500 mg 24 hr tablet    Current Facility-Administered Medications:     cyanocobalamin injection 1,000 mcg, 1,000 mcg, Intramuscular, Q30 Days, 1,000 mcg at 08/03/20 0859    cyanocobalamin injection 1,000 mcg, 1,000 mcg, Intramuscular, Q14 Days, 1,000 mcg at 05/18/20 1146    cyanocobalamin injection 1,000 mcg, 1,000 mcg, Intramuscular, Q30 Days, 1,000 mcg at 09/01/20 1129    Allergies   Allergen Reactions    Cimzia [Certolizumab Pegol] Swelling    Desvenlafaxine      Other reaction(s): state of confusion    Ixekizumab Vomiting    Valproic Acid Other (See Comments)     Other reaction(s): dilated pupils, "schizi"    Tizanidine Anxiety           Objective     Blood pressure 120/90, temperature 98 9 °F (37 2 °C), temperature source Tympanic, height 5' 3" (1 6 m), weight 113 kg (249 lb), not currently breastfeeding  Body mass index is 44 11 kg/m²  PHYSICAL EXAM:      General Appearance:   Alert, cooperative, no distress   HEENT:   Normocephalic, atraumatic, anicteric      Neck:  Supple, symmetrical, trachea midline   Lungs:   Clear to auscultation bilaterally; no rales, rhonchi or wheezing; respirations unlabored    Heart[de-identified]   Regular rate and rhythm; no murmur, rub, or gallop  Abdomen:   Soft, non-tender, non-distended; normal bowel sounds; no masses, no organomegaly    Genitalia:   Deferred    Rectal:   Deferred    Extremities:  No cyanosis, clubbing or edema    Pulses:  2+ and symmetric    Skin:  No jaundice, rashes, or lesions    Lymph nodes:  No palpable cervical lymphadenopathy        Lab Results:   No visits with results within 1 Day(s) from this visit     Latest known visit with results is:   Admission on 05/21/2022, Discharged on 05/21/2022   Component Date Value    WBC 05/21/2022 8 27     RBC 05/21/2022 4 18     Hemoglobin 05/21/2022 12 0     Hematocrit 05/21/2022 36 7     MCV 05/21/2022 88  MCH 05/21/2022 28 7     MCHC 05/21/2022 32 7     RDW 05/21/2022 14 5     MPV 05/21/2022 9 1     Platelets 72/95/1111 443 (A)    nRBC 05/21/2022 0     Neutrophils Relative 05/21/2022 57     Immat GRANS % 05/21/2022 0     Lymphocytes Relative 05/21/2022 32     Monocytes Relative 05/21/2022 9     Eosinophils Relative 05/21/2022 2     Basophils Relative 05/21/2022 0     Neutrophils Absolute 05/21/2022 4 73     Immature Grans Absolute 05/21/2022 0 01     Lymphocytes Absolute 05/21/2022 2 62     Monocytes Absolute 05/21/2022 0 72     Eosinophils Absolute 05/21/2022 0 16     Basophils Absolute 05/21/2022 0 03     Sodium 05/21/2022 137     Potassium 05/21/2022 3 6     Chloride 05/21/2022 102     CO2 05/21/2022 21     ANION GAP 05/21/2022 14 (A)    BUN 05/21/2022 5     Creatinine 05/21/2022 0 75     Glucose 05/21/2022 81     Calcium 05/21/2022 9 4     AST 05/21/2022 18     ALT 05/21/2022 13     Alkaline Phosphatase 05/21/2022 58     Total Protein 05/21/2022 7 5     Albumin 05/21/2022 4 0     Total Bilirubin 05/21/2022 0 71     eGFR 05/21/2022 107     Lipase 05/21/2022 14     Color, UA 05/21/2022 Yellow     Clarity, UA 05/21/2022 Clear     Specific Gravity, UA 05/21/2022 >=1 030 (A)    pH, UA 05/21/2022 6 0     Leukocytes, UA 05/21/2022 Trace (A)    Nitrite, UA 05/21/2022 Negative     Protein, UA 05/21/2022 Negative     Glucose, UA 05/21/2022 Negative     Ketones, UA 05/21/2022 80 (3+) (A)    Urobilinogen, UA 05/21/2022 0 2     Bilirubin, UA 05/21/2022 1+ (A)    Blood, UA 05/21/2022 Negative     Magnesium 05/21/2022 2 0     RBC, UA 05/21/2022 None Seen     WBC, UA 05/21/2022 10-20 (A)    Epithelial Cells 05/21/2022 Moderate (A)    Bacteria, UA 05/21/2022 Moderate (A)    MUCUS THREADS 05/21/2022 Moderate (A)         Radiology Results:   CT abdomen pelvis wo contrast    Result Date: 5/21/2022  Narrative: CT ABDOMEN AND PELVIS WITHOUT IV CONTRAST INDICATION: Nausea/vomiting persistent N/V  COMPARISON:  CT abdomen pelvis 4/18/2022 TECHNIQUE:  CT examination of the abdomen and pelvis was performed without intravenous contrast  This examination was performed without intravenous contrast in the context of the critical nationwide Omnipaque shortage  Axial, sagittal, and coronal 2D reformatted images were created from the source data and submitted for interpretation  Radiation dose length product (DLP) for this visit:  1176 mGy-cm   This examination, like all CT scans performed in the [facility], was performed utilizing techniques to minimize radiation dose exposure, including the use of iterative reconstruction and automated exposure control  Enteric contrast was administered  FINDINGS: ABDOMEN LOWER CHEST:  No clinically significant abnormality identified in the visualized lower chest  LIVER/BILIARY TREE:  Unremarkable  GALLBLADDER:  No calcified gallstones  No pericholecystic inflammatory change  SPLEEN:  Unremarkable  PANCREAS:  Unremarkable  ADRENAL GLANDS:  Unremarkable  KIDNEYS/URETERS:  Unremarkable  No hydronephrosis  STOMACH AND BOWEL:  Unremarkable  APPENDIX:  A normal appendix was visualized  ABDOMINOPELVIC CAVITY:  No ascites  No pneumoperitoneum  No lymphadenopathy  VESSELS:  Unremarkable for patient's age  PELVIS REPRODUCTIVE ORGANS:  Unremarkable for patient's age  URINARY BLADDER:  Unremarkable  ABDOMINAL WALL/INGUINAL REGIONS:  Unremarkable  OSSEOUS STRUCTURES:  No acute fracture or destructive osseous lesion  Impression: No acute intra-abdominal inflammatory process   Workstation performed: [id number]

## 2022-06-01 NOTE — PROGRESS NOTES
[facility] - Outpatient Follow-up Note  Mile Zelaya 28 y o  female MRN: [id number]  Encounter: [id number]          ASSESSMENT AND PLAN:      1  Nausea & vomiting  2  Dehydration  3  Left lower quadrant abdominal pain  4  Functional diarrhea  6  Gastroesophageal reflux disease without esophagitis      -EGD  -gastric emptying study  -lomotil as needed  -bentyl  Reglan for nausea  Reflux precautions  Stool test - enteric panel and fecal calprotectin    Follow up in few months     ______________________________________________________________________    SUBJECTIVE:  60-year-old female here for evaluation of multiple GI symptoms including nausea, vomiting, intermittent diarrhea and abdominal pain  She reports fullness, bloating and diarrhea  No melena or hematochezia  No weight loss  No dysphagia    She has inflammatory arthropathy and follows with rheumatologist [facility]   Her CRP and sed rate is elevated  She has thrombocytopenia  Previous treatments include Plaquenil and sulfasalazine  Intermittently required steroids  Previous biologic treatment include Humira, Imuran   Recently tried cimzia and Ixekizumab but discontinued due to Gi side effects  She had normal colonoscopy in 2018  Benicar started recently -    REVIEW OF SYSTEMS IS OTHERWISE NEGATIVE  Historical Information   Past Medical History:   Diagnosis Date    Abnormal Pap smear of cervix     Allergic     Anxiety     Arthritis     Depression     Diabetes mellitus (HCC)     Fibromyalgia, primary     Hypertension     IBS (irritable bowel syndrome)     Migraine     Obesity     Vitamin B12 deficiency      Past Surgical History:   Procedure Laterality Date    COLONOSCOPY N/A 5/8/2018    Procedure: COLONOSCOPY;  Surgeon: Shani Wilson MD;  Location: [facility] GI LAB;   Service: Gastroenterology    RI EXC SKIN BENIG >4 CM REMAINDR BODY N/A 7/1/2021    Procedure: EXCISION OF PILAR SCALP CYST X 2 AND RIGHT INNER GROIN NEVVUS;  Surgeon: Mau Ramirez MD;  Location: [address] [place] [address] MAIN OR;  Service: Plastics    AK RECMPL WND SCALP,EXTR 2 6-7 5 CM N/A 7/1/2021    Procedure: CLOSURE WOUND SCALP X 2 AND RIGHT INNER GROIN;  Surgeon: Mau Ramirez MD;  Location: [address] [place] [address] MAIN OR;  Service: Plastics    TONSILLECTOMY      WISDOM TOOTH EXTRACTION       Social History   Social History     Substance and Sexual Activity   Alcohol Use Yes    Comment: rarely     Social History     Substance and Sexual Activity   Drug Use Not Currently    Frequency: 2 0 times per week    Comment: medical marijuana (vape)     Social History     Tobacco Use   Smoking Status Never Smoker   Smokeless Tobacco Never Used     Family History   Problem Relation Age of Onset    Rheum arthritis Mother     Psoriasis Mother     Other Mother     Hypertension Mother     Diabetes unspecified Mother     Sjogren's syndrome Mother     Alcohol abuse Mother     Drug abuse Mother     Anxiety disorder Father     Alcohol abuse Father     Lung cancer Maternal Grandfather     Cancer Other         bone    Diabetes Other     Other Other         High blood pressure    Depression Maternal Grandmother        Meds/Allergies       Current Outpatient Medications:     Aimovig 140 MG/ML SOAJ    ALPRAZolam (XANAX) 0 5 mg tablet    ARIPiprazole (ABILIFY) 2 mg tablet    B-D 3CC LUER-DEANN SYR 25GX1" 25G X 1" 3 ML MISC    buPROPion (WELLBUTRIN XL) 300 mg 24 hr tablet    dicyclomine (BENTYL) 20 mg tablet    diphenoxylate-atropine (LOMOTIL) 2 5-0 025 mg per tablet    escitalopram (LEXAPRO) 20 mg tablet    indomethacin (INDOCIN) 25 mg capsule    ketorolac (TORADOL) 10 mg tablet    metoclopramide (Reglan) 10 mg tablet    olmesartan (BENICAR) 20 mg tablet    onabotulinumtoxin A (BOTOX) 100 units    ondansetron (ZOFRAN) 4 mg tablet    pantoprazole (PROTONIX) 40 mg tablet    Syringe/Needle, Disp, (SYRINGE 3CC/45LE7-5/4") 27G X 1-1/4" 3 ML MISC   etonogestrel-ethinyl estradiol (NuvaRing) 0 12-0 015 MG/24HR vaginal ring    metFORMIN (GLUCOPHAGE-XR) 500 mg 24 hr tablet    Current Facility-Administered Medications:     cyanocobalamin injection 1,000 mcg, 1,000 mcg, Intramuscular, Q30 Days, 1,000 mcg at 08/03/20 0859    cyanocobalamin injection 1,000 mcg, 1,000 mcg, Intramuscular, Q14 Days, 1,000 mcg at 05/18/20 1146    cyanocobalamin injection 1,000 mcg, 1,000 mcg, Intramuscular, Q30 Days, 1,000 mcg at 09/01/20 1129    Allergies   Allergen Reactions    Cimzia [Certolizumab Pegol] Swelling    Desvenlafaxine      Other reaction(s): state of confusion    Ixekizumab Vomiting    Valproic Acid Other (See Comments)     Other reaction(s): dilated pupils, "schizi"    Tizanidine Anxiety           Objective     Blood pressure 120/90, temperature 98 9 °F (37 2 °C), temperature source Tympanic, height 5' 3" (1 6 m), weight 113 kg (249 lb), not currently breastfeeding  Body mass index is 44 11 kg/m²  PHYSICAL EXAM:      General Appearance:   Alert, cooperative, no distress   HEENT:   Normocephalic, atraumatic, anicteric      Neck:  Supple, symmetrical, trachea midline   Lungs:   Clear to auscultation bilaterally; no rales, rhonchi or wheezing; respirations unlabored    Heart[de-identified]   Regular rate and rhythm; no murmur, rub, or gallop  Abdomen:   Soft, non-tender, non-distended; normal bowel sounds; no masses, no organomegaly    Genitalia:   Deferred    Rectal:   Deferred    Extremities:  No cyanosis, clubbing or edema    Pulses:  2+ and symmetric    Skin:  No jaundice, rashes, or lesions    Lymph nodes:  No palpable cervical lymphadenopathy        Lab Results:   No visits with results within 1 Day(s) from this visit     Latest known visit with results is:   Admission on 05/21/2022, Discharged on 05/21/2022   Component Date Value    WBC 05/21/2022 8 27     RBC 05/21/2022 4 18     Hemoglobin 05/21/2022 12 0     Hematocrit 05/21/2022 36 7     MCV 05/21/2022 88  MCH 05/21/2022 28 7     MCHC 05/21/2022 32 7     RDW 05/21/2022 14 5     MPV 05/21/2022 9 1     Platelets 14/13/7553 443 (A)    nRBC 05/21/2022 0     Neutrophils Relative 05/21/2022 57     Immat GRANS % 05/21/2022 0     Lymphocytes Relative 05/21/2022 32     Monocytes Relative 05/21/2022 9     Eosinophils Relative 05/21/2022 2     Basophils Relative 05/21/2022 0     Neutrophils Absolute 05/21/2022 4 73     Immature Grans Absolute 05/21/2022 0 01     Lymphocytes Absolute 05/21/2022 2 62     Monocytes Absolute 05/21/2022 0 72     Eosinophils Absolute 05/21/2022 0 16     Basophils Absolute 05/21/2022 0 03     Sodium 05/21/2022 137     Potassium 05/21/2022 3 6     Chloride 05/21/2022 102     CO2 05/21/2022 21     ANION GAP 05/21/2022 14 (A)    BUN 05/21/2022 5     Creatinine 05/21/2022 0 75     Glucose 05/21/2022 81     Calcium 05/21/2022 9 4     AST 05/21/2022 18     ALT 05/21/2022 13     Alkaline Phosphatase 05/21/2022 58     Total Protein 05/21/2022 7 5     Albumin 05/21/2022 4 0     Total Bilirubin 05/21/2022 0 71     eGFR 05/21/2022 107     Lipase 05/21/2022 14     Color, UA 05/21/2022 Yellow     Clarity, UA 05/21/2022 Clear     Specific Gravity, UA 05/21/2022 >=1 030 (A)    pH, UA 05/21/2022 6 0     Leukocytes, UA 05/21/2022 Trace (A)    Nitrite, UA 05/21/2022 Negative     Protein, UA 05/21/2022 Negative     Glucose, UA 05/21/2022 Negative     Ketones, UA 05/21/2022 80 (3+) (A)    Urobilinogen, UA 05/21/2022 0 2     Bilirubin, UA 05/21/2022 1+ (A)    Blood, UA 05/21/2022 Negative     Magnesium 05/21/2022 2 0     RBC, UA 05/21/2022 None Seen     WBC, UA 05/21/2022 10-20 (A)    Epithelial Cells 05/21/2022 Moderate (A)    Bacteria, UA 05/21/2022 Moderate (A)    MUCUS THREADS 05/21/2022 Moderate (A)         Radiology Results:   CT abdomen pelvis wo contrast    Result Date: 5/21/2022  Narrative: CT ABDOMEN AND PELVIS WITHOUT IV CONTRAST INDICATION: Nausea/vomiting persistent N/V  COMPARISON:  CT abdomen pelvis 4/18/2022 TECHNIQUE:  CT examination of the abdomen and pelvis was performed without intravenous contrast  This examination was performed without intravenous contrast in the context of the critical nationwide Omnipaque shortage  Axial, sagittal, and coronal 2D reformatted images were created from the source data and submitted for interpretation  Radiation dose length product (DLP) for this visit:  1176 mGy-cm   This examination, like all CT scans performed in the [facility], was performed utilizing techniques to minimize radiation dose exposure, including the use of iterative reconstruction and automated exposure control  Enteric contrast was administered  FINDINGS: ABDOMEN LOWER CHEST:  No clinically significant abnormality identified in the visualized lower chest  LIVER/BILIARY TREE:  Unremarkable  GALLBLADDER:  No calcified gallstones  No pericholecystic inflammatory change  SPLEEN:  Unremarkable  PANCREAS:  Unremarkable  ADRENAL GLANDS:  Unremarkable  KIDNEYS/URETERS:  Unremarkable  No hydronephrosis  STOMACH AND BOWEL:  Unremarkable  APPENDIX:  A normal appendix was visualized  ABDOMINOPELVIC CAVITY:  No ascites  No pneumoperitoneum  No lymphadenopathy  VESSELS:  Unremarkable for patient's age  PELVIS REPRODUCTIVE ORGANS:  Unremarkable for patient's age  URINARY BLADDER:  Unremarkable  ABDOMINAL WALL/INGUINAL REGIONS:  Unremarkable  OSSEOUS STRUCTURES:  No acute fracture or destructive osseous lesion  Impression: No acute intra-abdominal inflammatory process   Workstation performed: [id number]

## 2022-06-02 NOTE — PSYCH
Virtual Regular Visit    Verification of patient location:    Patient is located in the following state in which I hold an active license [place]      Assessment/Plan:    Problem List Items Addressed This Visit        Other    Generalized anxiety disorder (Chronic)    Depression, major, recurrent, moderate (HCC) - Primary (Chronic)          Goals addressed in session: Goal 1          Reason for visit is No chief complaint on file  Encounter provider APARNA De    Provider located at [address] [place] [id number] [phone]      Recent Visits  No visits were found meeting these conditions  Showing recent visits within past 7 days and meeting all other requirements  Future Appointments  No visits were found meeting these conditions  Showing future appointments within next 150 days and meeting all other requirements       The patient was identified by name and date of birth  Timmy Almonte was informed that this is a telemedicine visit and that the visit is being conducted throughEpic Embedded and patient was informed this is a secure, HIPAA-complaint platform  She agrees to proceed     My office door was closed  No one else was in the room  She acknowledged consent and understanding of privacy and security of the video platform  The patient has agreed to participate and understands they can discontinue the visit at any time  Patient is aware this is a billable service  Svetlana Sandoval is a 28 y o  female  DATA: Met with Stephanie for scheduled individual session  Alvin Marquis had requested an "emergency" session, due to not feeling well emotionally  Topics of discussion included family stressors, relationships with family, physical health concerns and relationships with friends  Alvin Marquis states that she is feeling angry and sad regarding her ex-boyfriend   She states, "I don't know why he can go on and live a happy life, and I feel like this " We spent some time defining what she meant by that statement  She expressed frustration with her physical health and her difficulty with managing her pain and her energy levels, as well as her depression and anxiety  We spent some time talking about the activities that she has been engaging in recently, which have increased her physical activity and improved her overall mood  She states that she wants to spend some more time focusing on her overall health and wellbeing  She states that she wants to increase her exercise and improve her nutrition  Eduardo La has been physically ill for the past week, and she recently has started to feel physically better  We discussed the connection between physical illness and mood dysregulation  This clinician encouraged her to be gentle with herself and focus on self-care  Eduardo La also discussed her relationships with her family members  She was able to spend some time with her grandmother, which was a positive for her  She also expressed her continued sadness regarding her uncle's unwillingness to talk with her  We discussed how this might ease a bit, now that she is no longer in her relationship with Estefania Ashraf  She is able to acknowledge that her uncle was unhappy, in large part, due to Sonia's lack of consideration regarding covid precautions  Client shows evidence of utilizing Mindfulness-based strategies, emotion regulation skills and distress tolerance skills skills to manage mental health symptoms  During this session, this clinician used the following therapeutic modalities: supportive psychotherapy, client-centered therapy, mindfulness-based strategies, DBT-informed skills, Motivational Interviewing and solution-focused therapy  ASSESSMENT: Eduardo La presents with a dysphoric mood  Her affect is normal range and intensity, appropriate  Eduardo La exhibits good therapeutic rapport with this clinician   Eduardo La continues to exhibit willingness to work on treatment goals and objectives  Soto Robertson presents with a minimal risk of suicide, minimal risk of self-harm, and minimal risk of harm to others  PLAN: Soto Robertson will return in approximately two weeks for the next scheduled session  Between sessions, Soto Robertson will continue to work on improving her nutrition and increasing her physical activity  She will report back during the next session re: successes and barriers  At the next session, this clinician will use supportive psychotherapy, client-centered therapy, mindfulness-based strategies, DBT-informed skills, Motivational Interviewing and solution-focused therapy to address her mood regulation and relationship concerns, in an effort to assist Soto Robertson with meeting treatment goals  HPI     Past Medical History:   Diagnosis Date    Abnormal Pap smear of cervix     Allergic     Anxiety     Arthritis     Depression     Diabetes mellitus (HCC)     Fibromyalgia, primary     Hypertension     IBS (irritable bowel syndrome)     Migraine     Obesity     Vitamin B12 deficiency        Past Surgical History:   Procedure Laterality Date    COLONOSCOPY N/A 5/8/2018    Procedure: COLONOSCOPY;  Surgeon: Carmen Mckenzie MD;  Location: [facility]; Service: Gastroenterology    ME EXC SKIN BENIG >4 CM REMAINDR BODY N/A 7/1/2021    Procedure: EXCISION OF PILAR SCALP CYST X 2 AND RIGHT INNER GROIN NEVVUS;  Surgeon: Ellie Kamara MD;  Location: [address] [place] [address];  Service: Plastics    ME RECMPL WND SCALP,EXTR 2 6-7 5 CM N/A 7/1/2021    Procedure: CLOSURE WOUND SCALP X 2 AND RIGHT INNER GROIN;  Surgeon: Ellie Kamara MD;  Location: [address] [place] [address];  Service: Plastics    TONSILLECTOMY      WISDOM TOOTH EXTRACTION         Current Outpatient Medications   Medication Sig Dispense Refill    Aimovig 140 MG/ML SOAJ Inject 140mg under the skin every 30 (thirty) days   1 mL 10    ALPRAZolam (XANAX) 0 5 mg tablet Take 1 tablet (0 5 mg total) by mouth as needed in the morning and 1 tablet (0 5 mg total) as needed in the evening for anxiety  60 tablet 2    ARIPiprazole (ABILIFY) 2 mg tablet Take 1 tablet (2 mg total) by mouth daily 30 tablet 2    B-D 3CC LUER-DEANN SYR 25GX1" 25G X 1" 3 ML MISC        buPROPion (WELLBUTRIN XL) 300 mg 24 hr tablet Take 1 tablet (300 mg total) by mouth in the morning  30 tablet 2    dicyclomine (BENTYL) 20 mg tablet Take 1 tablet (20 mg total) by mouth 2 (two) times a day 60 tablet 1    diphenoxylate-atropine (LOMOTIL) 2 5-0 025 mg per tablet 1 tablet 4 times daily as needed for diarrhea 30 tablet 1    escitalopram (LEXAPRO) 20 mg tablet Take 1 tablet (20 mg total) by mouth in the morning  30 tablet 2    etonogestrel-ethinyl estradiol (NuvaRing) 0 12-0 015 MG/24HR vaginal ring Insert ring for 21 days then remove for one week  3 each 0    indomethacin (INDOCIN) 25 mg capsule 1 tab BID prn severe headache with food  Hold toradol  30 capsule 0    ketorolac (TORADOL) 10 mg tablet Take 1 tablet (10 mg total) by mouth every 6 (six) hours as needed (migraine) Max 2-3 per week  10 tablet 0    metFORMIN (GLUCOPHAGE-XR) 500 mg 24 hr tablet Take 2 tablets (1,000 mg total) by mouth daily with breakfast (Patient not taking: Reported on 5/27/2022) 60 tablet 2    metoclopramide (Reglan) 10 mg tablet Take 1 tablet (10 mg total) by mouth every 6 (six) hours 30 tablet 0    olmesartan (BENICAR) 20 mg tablet Take 1 tablet (20 mg total) by mouth in the morning   30 tablet 2    onabotulinumtoxin A (BOTOX) 100 units Inject as directed      ondansetron (ZOFRAN) 4 mg tablet Take 1 tablet (4 mg total) by mouth every 6 (six) hours 30 tablet 2    pantoprazole (PROTONIX) 40 mg tablet Take 1 tablet (40 mg total) by mouth daily 30 tablet 3    Syringe/Needle, Disp, (SYRINGE 3CC/11KD8-4/4") 27G X 1-1/4" 3 ML MISC Use for IM injection of ketorolac  4 each 0     Current Facility-Administered Medications   Medication Dose Route Frequency Provider Last Rate Last Admin  cyanocobalamin injection 1,000 mcg  1,000 mcg Intramuscular Q30 Days Samella Sessions, DO   1,000 mcg at 08/03/20 4404    cyanocobalamin injection 1,000 mcg  1,000 mcg Intramuscular Q14 Days Samella Sessions, DO   1,000 mcg at 05/18/20 1146    cyanocobalamin injection 1,000 mcg  1,000 mcg Intramuscular Q30 Days Samella Sessions, DO   1,000 mcg at 09/01/20 1129        Allergies   Allergen Reactions    Cimzia [Certolizumab Pegol] Swelling    Desvenlafaxine      Other reaction(s): state of confusion    Ixekizumab Vomiting    Valproic Acid Other (See Comments)     Other reaction(s): dilated pupils, "schizi"    Tizanidine Anxiety       Review of Systems    Video Exam    There were no vitals filed for this visit  Physical Exam     I spent 48 minutes directly with the patient during this visit     Start time of session: 2:03pm  End time of session: 2:51pm    [address] verbally agrees to participate in [employer]  Pt is aware that [employer] could be limited without vital signs or the ability to perform a full hands-on physical exam  Cruz Jaramillo understands she or the provider may request at any time to terminate the video visit and request the patient to seek care or treatment in person

## 2022-06-10 ENCOUNTER — OFFICE VISIT (OUTPATIENT)
Dept: URGENT CARE | Facility: CLINIC | Age: 32
End: 2022-06-10
Payer: COMMERCIAL

## 2022-06-10 VITALS
TEMPERATURE: 97.7 F | SYSTOLIC BLOOD PRESSURE: 144 MMHG | HEART RATE: 89 BPM | OXYGEN SATURATION: 100 % | RESPIRATION RATE: 16 BRPM | DIASTOLIC BLOOD PRESSURE: 79 MMHG

## 2022-06-10 DIAGNOSIS — R30.0 DYSURIA: ICD-10-CM

## 2022-06-10 DIAGNOSIS — N89.8 VAGINAL IRRITATION: Primary | ICD-10-CM

## 2022-06-10 LAB
SL AMB  POCT GLUCOSE, UA: NORMAL
SL AMB LEUKOCYTE ESTERASE,UA: NORMAL
SL AMB POCT BILIRUBIN,UA: NORMAL
SL AMB POCT BLOOD,UA: NORMAL
SL AMB POCT CLARITY,UA: YELLOW
SL AMB POCT COLOR,UA: NORMAL
SL AMB POCT KETONES,UA: NORMAL
SL AMB POCT NITRITE,UA: NORMAL
SL AMB POCT PH,UA: 6.5
SL AMB POCT SPECIFIC GRAVITY,UA: 1.01
SL AMB POCT URINE HCG: NEGATIVE
SL AMB POCT URINE PROTEIN: NORMAL
SL AMB POCT UROBILINOGEN: 0.2

## 2022-06-10 PROCEDURE — 87086 URINE CULTURE/COLONY COUNT: CPT | Performed by: NURSE PRACTITIONER

## 2022-06-10 PROCEDURE — 87591 N.GONORRHOEAE DNA AMP PROB: CPT | Performed by: NURSE PRACTITIONER

## 2022-06-10 PROCEDURE — 81002 URINALYSIS NONAUTO W/O SCOPE: CPT | Performed by: NURSE PRACTITIONER

## 2022-06-10 PROCEDURE — 99213 OFFICE O/P EST LOW 20 MIN: CPT | Performed by: NURSE PRACTITIONER

## 2022-06-10 PROCEDURE — 87491 CHLMYD TRACH DNA AMP PROBE: CPT | Performed by: NURSE PRACTITIONER

## 2022-06-10 PROCEDURE — 81025 URINE PREGNANCY TEST: CPT | Performed by: NURSE PRACTITIONER

## 2022-06-10 NOTE — PROGRESS NOTES
[facility]        NAME: José Miguel Carr is a 28 y o  female  : 1990    MRN: [id number]  DATE: Jacinta 10, 2022  TIME: 8:48 AM    Assessment and Plan   Dysuria [R30 0]  1  Dysuria  POCT urine dip    POCT urine HCG    Urine culture    Chlamydia/GC amplified DNA by PCR         Patient Instructions     Patient Instructions   Recommend otc hydrocortisone cream and sitz baths  Keep area clean and dry  If you develop any new or concerning symptoms please return or proceed to ER  Advise follow up with obgyn if symptoms persists  Will call if results are positive  Follow up with PCP in 3-5 days  Proceed to  ER if symptoms worsen  Chief Complaint     Chief Complaint   Patient presents with    Possible UTI     Dysuria and frequency         History of Present Illness       Urinary Tract Infection   This is a new problem  The current episode started yesterday  The problem occurs every urination  The problem has been unchanged  The quality of the pain is described as burning  The pain is moderate  There has been no fever  She is sexually active  There is no history of pyelonephritis  Associated symptoms include frequency and urgency  Pertinent negatives include no chills, discharge, flank pain, hematuria, nausea, possible pregnancy, sweats or vomiting  Associated symptoms comments: Vaginal irritation    She has tried nothing for the symptoms  There is no history of kidney stones or recurrent UTIs  States that she had intercourse 2 days ago and used a different brand of condoms and is concerned she is having a reaction to it  Review of Systems   Review of Systems   Constitutional: Negative for chills, diaphoresis, fatigue and fever  Respiratory: Negative for cough, chest tightness and shortness of breath  Cardiovascular: Negative for chest pain  Gastrointestinal: Negative for abdominal pain, diarrhea, nausea and vomiting  Genitourinary: Positive for dysuria, frequency and urgency  Negative for decreased urine volume, difficulty urinating, flank pain, hematuria, pelvic pain, vaginal bleeding, vaginal discharge and vaginal pain  Musculoskeletal: Negative for back pain, joint swelling, myalgias, neck pain and neck stiffness  Skin: Negative for rash  Neurological: Negative for dizziness, weakness, numbness and headaches  Current Medications       Current Outpatient Medications:     Aimovig 140 MG/ML SOAJ, Inject 140mg under the skin every 30 (thirty) days  , Disp: 1 mL, Rfl: 10    ALPRAZolam (XANAX) 0 5 mg tablet, Take 1 tablet (0 5 mg total) by mouth as needed in the morning and 1 tablet (0 5 mg total) as needed in the evening for anxiety  , Disp: 60 tablet, Rfl: 2    ARIPiprazole (ABILIFY) 2 mg tablet, Take 1 tablet (2 mg total) by mouth daily, Disp: 30 tablet, Rfl: 2    B-D 3CC LUER-DEANN SYR 25GX1" 25G X 1" 3 ML MISC,  , Disp: , Rfl:     buPROPion (WELLBUTRIN XL) 300 mg 24 hr tablet, Take 1 tablet (300 mg total) by mouth in the morning , Disp: 30 tablet, Rfl: 2    dicyclomine (BENTYL) 20 mg tablet, Take 1 tablet (20 mg total) by mouth 2 (two) times a day, Disp: 60 tablet, Rfl: 1    diphenoxylate-atropine (LOMOTIL) 2 5-0 025 mg per tablet, 1 tablet 4 times daily as needed for diarrhea, Disp: 30 tablet, Rfl: 1    escitalopram (LEXAPRO) 20 mg tablet, Take 1 tablet (20 mg total) by mouth in the morning , Disp: 30 tablet, Rfl: 2    etonogestrel-ethinyl estradiol (NuvaRing) 0 12-0 015 MG/24HR vaginal ring, Insert ring for 21 days then remove for one week , Disp: 3 each, Rfl: 0    indomethacin (INDOCIN) 25 mg capsule, 1 tab BID prn severe headache with food   Hold toradol , Disp: 30 capsule, Rfl: 0    ketorolac (TORADOL) 10 mg tablet, Take 1 tablet (10 mg total) by mouth every 6 (six) hours as needed (migraine) Max 2-3 per week , Disp: 10 tablet, Rfl: 0    metFORMIN (GLUCOPHAGE-XR) 500 mg 24 hr tablet, Take 2 tablets (1,000 mg total) by mouth daily with breakfast (Patient not taking: Reported on 5/27/2022), Disp: 60 tablet, Rfl: 2    metoclopramide (Reglan) 10 mg tablet, Take 1 tablet (10 mg total) by mouth every 6 (six) hours, Disp: 30 tablet, Rfl: 0    olmesartan (BENICAR) 20 mg tablet, Take 1 tablet (20 mg total) by mouth in the morning , Disp: 30 tablet, Rfl: 2    onabotulinumtoxin A (BOTOX) 100 units, Inject as directed, Disp: , Rfl:     ondansetron (ZOFRAN) 4 mg tablet, Take 1 tablet (4 mg total) by mouth every 6 (six) hours, Disp: 30 tablet, Rfl: 2    pantoprazole (PROTONIX) 40 mg tablet, Take 1 tablet (40 mg total) by mouth daily, Disp: 30 tablet, Rfl: 3    Syringe/Needle, Disp, (SYRINGE 3CC/36MX7-3/4") 27G X 1-1/4" 3 ML MISC, Use for IM injection of ketorolac , Disp: 4 each, Rfl: 0    Current Facility-Administered Medications:     cyanocobalamin injection 1,000 mcg, 1,000 mcg, Intramuscular, Q30 Days, Jacky Roxi, DO, 1,000 mcg at 08/03/20 0414    cyanocobalamin injection 1,000 mcg, 1,000 mcg, Intramuscular, Q14 Days, Jacky Roxi, DO, 1,000 mcg at 05/18/20 1146    cyanocobalamin injection 1,000 mcg, 1,000 mcg, Intramuscular, Q30 Days, Jacky Roxi, DO, 1,000 mcg at 09/01/20 1129    Current Allergies     Allergies as of 06/10/2022 - Reviewed 06/10/2022   Allergen Reaction Noted    Cimzia [certolizumab pegol] Swelling 03/22/2022    Desvenlafaxine  11/26/2012    Ixekizumab Vomiting 05/27/2022    Valproic acid Other (See Comments) 10/18/2017    Tizanidine Anxiety 01/20/2021            The following portions of the patient's history were reviewed and updated as appropriate: allergies, current medications, past family history, past medical history, past social history, past surgical history and problem list      Past Medical History:   Diagnosis Date    Abnormal Pap smear of cervix     Allergic     Anxiety     Arthritis     Depression     Diabetes mellitus (HCC)     Fibromyalgia, primary     Hypertension     IBS (irritable bowel syndrome)     Migraine     Obesity     Vitamin B12 deficiency        Past Surgical History:   Procedure Laterality Date    COLONOSCOPY N/A 5/8/2018    Procedure: COLONOSCOPY;  Surgeon: Aracelis Bah MD;  Location: [facility] GI LAB; Service: Gastroenterology    MN EXC SKIN BENIG >4 CM REMAINDR BODY N/A 7/1/2021    Procedure: EXCISION OF PILAR SCALP CYST X 2 AND RIGHT INNER GROIN NEVVUS;  Surgeon: Mir Gomez MD;  Location:  MAIN OR;  Service: Plastics    MN RECMPL WND SCALP,EXTR 2 6-7 5 CM N/A 7/1/2021    Procedure: CLOSURE WOUND SCALP X 2 AND RIGHT INNER GROIN;  Surgeon: Mir Gomez MD;  Location: [address] [place] [address] MAIN OR;  Service: Plastics    TONSILLECTOMY      WISDOM TOOTH EXTRACTION         Family History   Problem Relation Age of Onset    Rheum arthritis Mother     Psoriasis Mother     Other Mother     Hypertension Mother     Diabetes unspecified Mother     Sjogren's syndrome Mother     Alcohol abuse Mother     Drug abuse Mother     Anxiety disorder Father     Alcohol abuse Father     Lung cancer Maternal Grandfather     Cancer Other         bone    Diabetes Other     Other Other         High blood pressure    Depression Maternal Grandmother          Medications have been verified  Objective   /79   Pulse 89   Temp 97 7 °F (36 5 °C)   Resp 16   LMP  (LMP Unknown)   SpO2 100%   No LMP recorded (lmp unknown)  (Menstrual status: Birth Control)  Physical Exam     Physical Exam  Constitutional:       General: She is not in acute distress  Appearance: Normal appearance  She is well-developed  She is not diaphoretic  Cardiovascular:      Rate and Rhythm: Normal rate and regular rhythm  Heart sounds: Normal heart sounds  Pulmonary:      Effort: Pulmonary effort is normal       Breath sounds: Normal breath sounds  Abdominal:      General: Bowel sounds are normal  There is no distension  Palpations: Abdomen is soft  Abdomen is not rigid  There is no mass  Tenderness:  There is no abdominal tenderness  There is no right CVA tenderness, left CVA tenderness, guarding or rebound  Negative signs include Segura's sign and McBurney's sign  Skin:     General: Skin is warm and dry  Neurological:      Mental Status: She is alert and oriented to person, place, and time

## 2022-06-10 NOTE — PATIENT INSTRUCTIONS
Recommend otc hydrocortisone cream and sitz baths  Keep area clean and dry  If you develop any new or concerning symptoms please return or proceed to ER  Advise follow up with obgyn if symptoms persists  Will call if results are positive

## 2022-06-12 LAB — BACTERIA UR CULT: NORMAL

## 2022-06-13 LAB
C TRACH DNA SPEC QL NAA+PROBE: NEGATIVE
N GONORRHOEA DNA SPEC QL NAA+PROBE: NEGATIVE

## 2022-06-17 ENCOUNTER — TELEMEDICINE (OUTPATIENT)
Dept: BEHAVIORAL/MENTAL HEALTH CLINIC | Facility: CLINIC | Age: 32
End: 2022-06-17
Payer: COMMERCIAL

## 2022-06-17 DIAGNOSIS — F41.1 GENERALIZED ANXIETY DISORDER: Chronic | ICD-10-CM

## 2022-06-17 DIAGNOSIS — F33.1 DEPRESSION, MAJOR, RECURRENT, MODERATE (HCC): Primary | Chronic | ICD-10-CM

## 2022-06-17 PROCEDURE — 90834 PSYTX W PT 45 MINUTES: CPT | Performed by: SOCIAL WORKER

## 2022-06-22 ENCOUNTER — HOSPITAL ENCOUNTER (OUTPATIENT)
Dept: GASTROENTEROLOGY | Facility: HOSPITAL | Age: 32
Setting detail: OUTPATIENT SURGERY
Discharge: HOME | End: 2022-06-22
Attending: INTERNAL MEDICINE | Admitting: INTERNAL MEDICINE
Payer: COMMERCIAL

## 2022-06-22 ENCOUNTER — ANESTHESIA EVENT (OUTPATIENT)
Dept: GASTROENTEROLOGY | Facility: HOSPITAL | Age: 32
End: 2022-06-22

## 2022-06-22 ENCOUNTER — ANESTHESIA (OUTPATIENT)
Dept: GASTROENTEROLOGY | Facility: HOSPITAL | Age: 32
End: 2022-06-22

## 2022-06-22 VITALS
RESPIRATION RATE: 16 BRPM | DIASTOLIC BLOOD PRESSURE: 62 MMHG | HEART RATE: 88 BPM | TEMPERATURE: 98.3 F | OXYGEN SATURATION: 99 % | SYSTOLIC BLOOD PRESSURE: 109 MMHG

## 2022-06-22 DIAGNOSIS — R11.10 VOMITING, UNSPECIFIED VOMITING TYPE, UNSPECIFIED WHETHER NAUSEA PRESENT: ICD-10-CM

## 2022-06-22 DIAGNOSIS — R11.0 NAUSEA: ICD-10-CM

## 2022-06-22 PROBLEM — M45.9 ANKYLOSING SPONDYLITIS (HCC): Status: ACTIVE | Noted: 2022-06-22

## 2022-06-22 PROCEDURE — 88305 TISSUE EXAM BY PATHOLOGIST: CPT | Performed by: PATHOLOGY

## 2022-06-22 PROCEDURE — 43239 EGD BIOPSY SINGLE/MULTIPLE: CPT | Performed by: INTERNAL MEDICINE

## 2022-06-22 RX ORDER — LIDOCAINE HYDROCHLORIDE 10 MG/ML
INJECTION, SOLUTION EPIDURAL; INFILTRATION; INTRACAUDAL; PERINEURAL AS NEEDED
Status: DISCONTINUED | OUTPATIENT
Start: 2022-06-22 | End: 2022-06-22

## 2022-06-22 RX ORDER — SODIUM CHLORIDE 9 MG/ML
INJECTION, SOLUTION INTRAVENOUS CONTINUOUS PRN
Status: DISCONTINUED | OUTPATIENT
Start: 2022-06-22 | End: 2022-06-22

## 2022-06-22 RX ORDER — KETAMINE HCL IN NACL, ISO-OSM 100MG/10ML
SYRINGE (ML) INJECTION AS NEEDED
Status: DISCONTINUED | OUTPATIENT
Start: 2022-06-22 | End: 2022-06-22

## 2022-06-22 RX ORDER — PROPOFOL 10 MG/ML
INJECTION, EMULSION INTRAVENOUS AS NEEDED
Status: DISCONTINUED | OUTPATIENT
Start: 2022-06-22 | End: 2022-06-22

## 2022-06-22 RX ADMIN — PROPOFOL 150 MG: 10 INJECTION, EMULSION INTRAVENOUS at 11:59

## 2022-06-22 RX ADMIN — PROPOFOL 50 MG: 10 INJECTION, EMULSION INTRAVENOUS at 12:03

## 2022-06-22 RX ADMIN — PROPOFOL 50 MG: 10 INJECTION, EMULSION INTRAVENOUS at 12:01

## 2022-06-22 RX ADMIN — Medication 20 MG: at 11:59

## 2022-06-22 RX ADMIN — PROPOFOL 50 MG: 10 INJECTION, EMULSION INTRAVENOUS at 12:05

## 2022-06-22 RX ADMIN — LIDOCAINE HYDROCHLORIDE 50 MG: 10 INJECTION, SOLUTION EPIDURAL; INFILTRATION; INTRACAUDAL; PERINEURAL at 11:59

## 2022-06-22 RX ADMIN — SODIUM CHLORIDE: 0.9 INJECTION, SOLUTION INTRAVENOUS at 11:50

## 2022-06-22 NOTE — ANESTHESIA POSTPROCEDURE EVALUATION
Post-Op Assessment Note    CV Status:  Stable  Pain Score: 0    Pain management: adequate     Mental Status:  Awake and alert   Hydration Status:  Stable   PONV Controlled:  None   Airway Patency:  Patent and adequate      Post Op Vitals Reviewed: Yes      Staff: CRNA, Anesthesiologist         No complications documented      BP   148/79   Temp      Pulse 98   Resp   16   SpO2   99

## 2022-06-22 NOTE — ANESTHESIA PREPROCEDURE EVALUATION
Procedure:  EGD    Relevant Problems   CARDIO   (+) Anterior chest wall pain   (+) Chronic migraine without aura without status migrainosus, not intractable   (+) Hyperlipidemia   (+) Hypertension   (+) Intractable migraine with aura without status migrainosus   (+) Migraine headache   (+) Migraine with aura and with status migrainosus      GI/HEPATIC   (+) Gastroesophageal reflux disease without esophagitis   (+) Hyperinsulinemia      GYN  Patient refused pregnancy test  States that she ha snot been sexually active in two years and is on birth control  I explained the risk if she is pregnant and she stated she understands but stil declines      HEMATOLOGY   (-) Coagulation disorder ([address] )   (-) Hypercoagulable state (HCC)      MUSCULOSKELETAL   (+) Ankylosing spondylitis (HCC)   (+) Chronic low back pain   (+) Fibromyalgia syndrome      NEURO/PSYCH   (+) Chronic low back pain   (+) Chronic migraine without aura without status migrainosus, not intractable   (+) Depression, major, recurrent, moderate (HCC)   (+) Fibromyalgia syndrome   (+) Generalized anxiety disorder   (+) Intractable migraine with aura without status migrainosus   (+) Migraine headache   (+) Migraine with aura and with status migrainosus   (+) Other complicated headache syndrome   (+) Paresthesias      PULMONARY   (+) Pharyngitis due to Streptococcus species   (+) URI (upper respiratory infection)   (+) Upper respiratory infection      Other   (+) Morbid obesity with BMI of 40 0-44 9, adult (HCC)      Normal sinus rhythm  Tracing is within normal limits  When compared with ECG of 21-NOV-2021 11:25,  No significant change was found  Confirmed by Reina Awan ([phone]) on 4/17/2022 3:19:16 PM    Physical Exam    Airway    Mallampati score:  I  TM Distance: >3 FB  Neck ROM: full     Dental   No notable dental hx     Cardiovascular  Rhythm: regular, Rate: normal,     Pulmonary  Breath sounds clear to auscultation,     Other Findings        Anesthesia Plan  ASA Score- 3     Anesthesia Type- IV sedation with anesthesia with ASA Monitors  Additional Monitors:   Airway Plan:           Plan Factors-Exercise tolerance (METS): >4 METS  Chart reviewed  Patient summary reviewed  Patient is not a current smoker  Obstructive sleep apnea risk education given perioperatively  Induction- intravenous  Postoperative Plan-     Informed Consent- Anesthetic plan and risks discussed with patient  I personally reviewed this patient with the CRNA  Discussed and agreed on the Anesthesia Plan with the CRNA  Michael Stock

## 2022-06-22 NOTE — INTERVAL H&P NOTE
H&P reviewed  After examining the patient I find no changes in the patients condition since the H&P had been written      Vitals:    06/22/22 1133   BP: 125/86   Pulse: 89   Resp: 16   Temp: 98 °F (36 7 °C)   SpO2: 98%

## 2022-06-23 ENCOUNTER — NURSE TRIAGE (OUTPATIENT)
Dept: OTHER | Facility: OTHER | Age: 32
End: 2022-06-23

## 2022-06-23 NOTE — TELEPHONE ENCOUNTER
Patient is post EGD 6/22/22 and is reporting neck swelling, sore throat, and unable to take oral medications  Please follow up with patient  Reason for Disposition   Caller has URGENT question and triager unable to answer question    Answer Assessment - Initial Assessment Questions  1  SYMPTOM: "What's the main symptom you're concerned about?" (e g , pain, fever, vomiting)      Swelling in neck and glands and sore throat    2  ONSET: "When did symptoms start?"     6/22/22 after return to home    3  SURGERY: "What surgery was performed?"      EGD    4  DATE of SURGERY: "When was surgery performed?"       6/22/22    5  ANESTHESIA: " What type of anesthesia did you have?" (e g , general, spinal, epidural, local)      General    6  PAIN: "Is there any pain?" If Yes, ask: "How bad is it?"  (Scale 1-10; or mild, moderate, severe)      Uncomfortable; mild pain    7  FEVER: "Do you have a fever?" If Yes, ask: "What is your temperature, how was it measured, and when did it start?"      Denies    8  VOMITING: "Is there any vomiting?" If yes, ask: "How many times?"     Denies    9  BLEEDING: "Is there any bleeding?" If Yes, ask: "How much?" and "Where?"      Denies    10  OTHER SYMPTOMS: "Do you have any other symptoms?" (e g , drainage from wound, painful urination, constipation)         Unable to swallow oral meds; stomach cramps (from biopsy? ?),  History of fibromyalgia    Protocols used: POST-OP SYMPTOMS AND QUESTIONS-[facility]

## 2022-06-23 NOTE — TELEPHONE ENCOUNTER
BLAISE VEE spoke to Alvin Marquis  She is tolerating liquids  She denies fever, chills, chest pain, shortness of breath  I explained throat soreness can be normal after an endoscopy  I asked her to stay on liquid diet for the next 24 hours  If her symptoms continue into tomorrow, I asked her to call the office back  Otherwise if she would develop any of the above-mentioned symptoms, she was instructed to go to the emergency room as soon as possible

## 2022-06-23 NOTE — TELEPHONE ENCOUNTER
Regarding: swelling on my neck and glands after yesterdays proceudre   ----- Message from [facility] sent at 6/23/2022 10:51 AM EDT -----  "I had an endoscopy done yesterday  I am currently experiencing swelling on my neck and glands    I also have a sore throat "

## 2022-07-01 ENCOUNTER — TELEMEDICINE (OUTPATIENT)
Dept: BEHAVIORAL/MENTAL HEALTH CLINIC | Facility: CLINIC | Age: 32
End: 2022-07-01
Payer: COMMERCIAL

## 2022-07-01 DIAGNOSIS — F41.1 GENERALIZED ANXIETY DISORDER: Chronic | ICD-10-CM

## 2022-07-01 DIAGNOSIS — F33.1 DEPRESSION, MAJOR, RECURRENT, MODERATE (HCC): Primary | Chronic | ICD-10-CM

## 2022-07-01 PROCEDURE — 90834 PSYTX W PT 45 MINUTES: CPT | Performed by: SOCIAL WORKER

## 2022-07-01 NOTE — PSYCH
Virtual Regular Visit    Verification of patient location:    Patient is located in the following state in which I hold an active license [place]    Assessment/Plan:    Problem List Items Addressed This Visit        Other    Generalized anxiety disorder (Chronic)    Depression, major, recurrent, moderate (HCC) - Primary (Chronic)        Goals addressed in session: Goal 1      Reason for visit is No chief complaint on file  Encounter provider APARNA Chavez    Provider located at [address] [place] [id number] [phone]    Recent Visits  No visits were found meeting these conditions  Showing recent visits within past 7 days and meeting all other requirements  Future Appointments  No visits were found meeting these conditions  Showing future appointments within next 150 days and meeting all other requirements     The patient was identified by name and date of birth  Kaden Holman was informed that this is a telemedicine visit and that the visit is being conducted throughEpic Embedded and patient was informed this is a secure, HIPAA-complaint platform  She agrees to proceed     My office door was closed  No one else was in the room  She acknowledged consent and understanding of privacy and security of the video platform  The patient has agreed to participate and understands they can discontinue the visit at any time  Patient is aware this is a billable service  Anatoly Raines is a 28 y o  female  DATA: Met with Stephanie for scheduled individual session  Topics of discussion included relationships with family, physical health concerns, relationships with friends and mood regulation and symptoms  "I just got my 5th shot, so I have a migraine today " Stephanie discussed her recent endoscopy  She states that "everything looked ok"   She states that they did take some biopsies, and she is waiting to get the results of those tests  She states that she continues to feel some nausea and does not have an appetite  She states that she has not vomited since she had her endoscopy  She states that she has lost a total of 15 pounds-- due to this (as well as continued exercise)  Rosangela Patrick states that she continues to exercise and is "up to 45 minutes of hooping  "Rosangela Patrick states that she is "scared" and really upset about the recent [employer] decision  She states that her friend Jose Mca has "come to my side" and is supportive of Stephanie  She states that she decided not to dress in red, white, and blue for her birthday this year  She states, "I feel that, as a person, as a woman, a part of the LGBT community, I just can't do it " She states that she and her best friend are going to wear black and she will have a black birthday cake this year  She states, "I feel that I can't celebrate that " Rosangela Patrick discussed her relationship with her best-friend's 7-yo daughter  She discussed her anger at her friend's daughter's disrespect  We discussed child-development and ways to talk with her, so that she can learn how to treat people appropriately  Rosangela Patrick states that she has been having some dreams about her ex-  She states, "I wish that I could act as if he never existed " We discussed how trauma cannot just be suppressed, or it will come out in another way (e g , having dreams)  She acknowledged understanding  She states that she has recently started to "shake", which is a new symptom for her  She states that she does not feel that she is experiencing anxiety; however, she is not able to identify any trigger or physical issue associated with it  She states that it has been occurring for the past 2-3 weeks  We discussed some of the possible reasons that this could be occurring, and this clinician encouraged her to reach out to her medical providers, if this symptom does not subside   Client shows evidence of utilizing Mindfulness-based strategies, emotion regulation skills and distress tolerance skills skills to manage mental health symptoms  During this session, this clinician used the following therapeutic modalities: supportive psychotherapy, client-centered therapy, mindfulness-based strategies, DBT-informed skills, Motivational Interviewing and solution-focused therapy  ASSESSMENT: Christian Palomino presents with a normal mood  Her affect is normal range and intensity, appropriate  Christian Palomino exhibits good therapeutic rapport with this clinician  Christian Palomino continues to exhibit willingness to work on treatment goals and objectives  Christian Palomino presents with a minimal risk of suicide, minimal risk of self-harm, and minimal risk of harm to others  PLAN: Christian Palomino will return in approximately one month (due to this clinician's vacation) for the next scheduled session  She will be on the cancellation wait list between sessions, for a sooner appointment  Between sessions, Christian Palomino will continue to work on developing her mindfulness skills and will report back during the next session re: successes and barriers  At the next session, this clinician will use supportive psychotherapy, client-centered therapy, mindfulness-based strategies, DBT-informed skills, Motivational Interviewing and solution-focused therapy to address her mood regulation and relationship concerns, in an effort to assist Christian Palomino with meeting treatment goals  HPI     Past Medical History:   Diagnosis Date    Abnormal Pap smear of cervix     Allergic     Anxiety     Arthritis     Depression     Diabetes mellitus (HCC)     Fibromyalgia, primary     Hypertension     IBS (irritable bowel syndrome)     Migraine     Obesity     Vitamin B12 deficiency        Past Surgical History:   Procedure Laterality Date    COLONOSCOPY N/A 5/8/2018    Procedure: COLONOSCOPY;  Surgeon: Kameron Chirinos MD;  [facility];   Service: Gastroenterology    AZ EXC SKIN BENIG >4 CM REMAINDR BODY N/A 7/1/2021    Procedure: EXCISION OF PILAR SCALP CYST X 2 AND RIGHT INNER GROIN NEVVUS;  Surgeon: Mandi Victor MD;  Location: [address] [place] [address] OR;  Service: Plastics    [place] RECMPL WND SCALP,EXTR 2 6-7 5 CM N/A 7/1/2021    Procedure: CLOSURE WOUND SCALP X 2 AND RIGHT INNER GROIN;  Surgeon: Mandi Victor MD;  Location: [address] [place] [address] OR;  Service: Plastics    TONSILLECTOMY      WISDOM TOOTH EXTRACTION         Current Outpatient Medications   Medication Sig Dispense Refill    Aimovig 140 MG/ML SOAJ Inject 140mg under the skin every 30 (thirty) days  1 mL 10    ALPRAZolam (XANAX) 0 5 mg tablet Take 1 tablet (0 5 mg total) by mouth as needed in the morning and 1 tablet (0 5 mg total) as needed in the evening for anxiety  60 tablet 2    ARIPiprazole (ABILIFY) 2 mg tablet Take 1 tablet (2 mg total) by mouth daily 30 tablet 2    B-D 3CC LUER-DEANN SYR 25GX1" 25G X 1" 3 ML MISC        buPROPion (WELLBUTRIN XL) 300 mg 24 hr tablet Take 1 tablet (300 mg total) by mouth in the morning  30 tablet 2    dicyclomine (BENTYL) 20 mg tablet Take 1 tablet (20 mg total) by mouth 2 (two) times a day 60 tablet 1    diphenoxylate-atropine (LOMOTIL) 2 5-0 025 mg per tablet 1 tablet 4 times daily as needed for diarrhea 30 tablet 1    escitalopram (LEXAPRO) 20 mg tablet Take 1 tablet (20 mg total) by mouth in the morning  30 tablet 2    etonogestrel-ethinyl estradiol (NuvaRing) 0 12-0 015 MG/24HR vaginal ring Insert ring for 21 days then remove for one week  3 each 0    indomethacin (INDOCIN) 25 mg capsule 1 tab BID prn severe headache with food  Hold toradol  30 capsule 0    ketorolac (TORADOL) 10 mg tablet Take 1 tablet (10 mg total) by mouth every 6 (six) hours as needed (migraine) Max 2-3 per week   10 tablet 0    metFORMIN (GLUCOPHAGE-XR) 500 mg 24 hr tablet Take 2 tablets (1,000 mg total) by mouth daily with breakfast (Patient not taking: Reported on 5/27/2022) 60 tablet 2    metoclopramide (Reglan) 10 mg tablet Take 1 tablet (10 mg total) by mouth every 6 (six) hours 30 tablet 0    olmesartan (BENICAR) 20 mg tablet Take 1 tablet (20 mg total) by mouth in the morning  30 tablet 2    onabotulinumtoxin A (BOTOX) 100 units Inject as directed      ondansetron (ZOFRAN) 4 mg tablet Take 1 tablet (4 mg total) by mouth every 6 (six) hours 30 tablet 2    pantoprazole (PROTONIX) 40 mg tablet Take 1 tablet (40 mg total) by mouth daily 30 tablet 3    Syringe/Needle, Disp, (SYRINGE 3CC/30AC7-5/4") 27G X 1-1/4" 3 ML MISC Use for IM injection of ketorolac  4 each 0     Current Facility-Administered Medications   Medication Dose Route Frequency Provider Last Rate Last Admin    cyanocobalamin injection 1,000 mcg  1,000 mcg Intramuscular Q30 Days Concepcion Hoots, DO   1,000 mcg at 08/03/20 3110    cyanocobalamin injection 1,000 mcg  1,000 mcg Intramuscular Q14 Days Concepcion Hoots, DO   1,000 mcg at 05/18/20 1146    cyanocobalamin injection 1,000 mcg  1,000 mcg Intramuscular Q30 Days Concepcion Hoots, DO   1,000 mcg at 09/01/20 1129        Allergies   Allergen Reactions    Cimzia [Certolizumab Pegol] Swelling    Desvenlafaxine      Other reaction(s): state of confusion    Ixekizumab Vomiting    Valproic Acid Other (See Comments)     Other reaction(s): dilated pupils, "schizi"    Tizanidine Anxiety       Review of Systems    Video Exam    There were no vitals filed for this visit  Physical Exam     I spent 52 minutes directly with the patient during this visit     Session start time: 9:02am  Session end time: 9:54am    [address] verbally agrees to participate in [employer]  Pt is aware that [employer] could be limited without vital signs or the ability to perform a full hands-on physical exam  Cruz Mittal understands she or the provider may request at any time to terminate the video visit and request the patient to seek care or treatment in person

## 2022-07-13 ENCOUNTER — TELEPHONE (OUTPATIENT)
Dept: NEUROLOGY | Facility: CLINIC | Age: 32
End: 2022-07-13

## 2022-07-18 ENCOUNTER — APPOINTMENT (EMERGENCY)
Dept: RADIOLOGY | Facility: HOSPITAL | Age: 32
End: 2022-07-18
Payer: COMMERCIAL

## 2022-07-18 ENCOUNTER — HOSPITAL ENCOUNTER (EMERGENCY)
Facility: HOSPITAL | Age: 32
Discharge: HOME | End: 2022-07-18
Attending: EMERGENCY MEDICINE
Payer: COMMERCIAL

## 2022-07-18 VITALS
HEART RATE: 78 BPM | TEMPERATURE: 97.2 F | WEIGHT: 249 LBS | BODY MASS INDEX: 44.12 KG/M2 | SYSTOLIC BLOOD PRESSURE: 135 MMHG | DIASTOLIC BLOOD PRESSURE: 89 MMHG | HEIGHT: 63 IN | OXYGEN SATURATION: 97 % | RESPIRATION RATE: 17 BRPM

## 2022-07-18 DIAGNOSIS — R07.89 ANTERIOR CHEST WALL PAIN: Primary | ICD-10-CM

## 2022-07-18 DIAGNOSIS — Z30.44 ENCOUNTER FOR SURVEILLANCE OF VAGINAL RING HORMONAL CONTRACEPTIVE DEVICE: ICD-10-CM

## 2022-07-18 LAB
ANION GAP SERPL CALCULATED.3IONS-SCNC: 10 MMOL/L (ref 4–13)
ATRIAL RATE: 83 BPM
BASOPHILS # BLD AUTO: 0.01 THOUSANDS/ΜL (ref 0–0.1)
BASOPHILS NFR BLD AUTO: 0 % (ref 0–1)
BUN SERPL-MCNC: 8 MG/DL (ref 5–25)
CALCIUM SERPL-MCNC: 9.1 MG/DL (ref 8.4–10.2)
CARDIAC TROPONIN I PNL SERPL HS: <2 NG/L
CHLORIDE SERPL-SCNC: 104 MMOL/L (ref 96–108)
CO2 SERPL-SCNC: 20 MMOL/L (ref 21–32)
CREAT SERPL-MCNC: 0.56 MG/DL (ref 0.6–1.3)
EOSINOPHIL # BLD AUTO: 0 THOUSAND/ΜL (ref 0–0.61)
EOSINOPHIL NFR BLD AUTO: 0 % (ref 0–6)
ERYTHROCYTE [DISTWIDTH] IN BLOOD BY AUTOMATED COUNT: 13.5 % (ref 11.6–15.1)
EXT PREG TEST URINE: NEGATIVE
EXT. CONTROL ED NAV: NORMAL
GFR SERPL CREATININE-BSD FRML MDRD: 124 ML/MIN/1.73SQ M
GLUCOSE SERPL-MCNC: 98 MG/DL (ref 65–140)
HCT VFR BLD AUTO: 37.8 % (ref 34.8–46.1)
HGB BLD-MCNC: 12.3 G/DL (ref 11.5–15.4)
IMM GRANULOCYTES # BLD AUTO: 0.02 THOUSAND/UL (ref 0–0.2)
IMM GRANULOCYTES NFR BLD AUTO: 0 % (ref 0–2)
LYMPHOCYTES # BLD AUTO: 1.57 THOUSANDS/ΜL (ref 0.6–4.47)
LYMPHOCYTES NFR BLD AUTO: 18 % (ref 14–44)
MCH RBC QN AUTO: 28.5 PG (ref 26.8–34.3)
MCHC RBC AUTO-ENTMCNC: 32.5 G/DL (ref 31.4–37.4)
MCV RBC AUTO: 88 FL (ref 82–98)
MONOCYTES # BLD AUTO: 0.38 THOUSAND/ΜL (ref 0.17–1.22)
MONOCYTES NFR BLD AUTO: 5 % (ref 4–12)
NEUTROPHILS # BLD AUTO: 6.55 THOUSANDS/ΜL (ref 1.85–7.62)
NEUTS SEG NFR BLD AUTO: 77 % (ref 43–75)
NRBC BLD AUTO-RTO: 0 /100 WBCS
P AXIS: 42 DEGREES
PLATELET # BLD AUTO: 519 THOUSANDS/UL (ref 149–390)
PMV BLD AUTO: 9.1 FL (ref 8.9–12.7)
POTASSIUM SERPL-SCNC: 4 MMOL/L (ref 3.5–5.3)
PR INTERVAL: 150 MS
QRS AXIS: 6 DEGREES
QRSD INTERVAL: 88 MS
QT INTERVAL: 374 MS
QTC INTERVAL: 439 MS
RBC # BLD AUTO: 4.32 MILLION/UL (ref 3.81–5.12)
SODIUM SERPL-SCNC: 134 MMOL/L (ref 135–147)
T WAVE AXIS: 27 DEGREES
VENTRICULAR RATE: 83 BPM
WBC # BLD AUTO: 8.53 THOUSAND/UL (ref 4.31–10.16)

## 2022-07-18 PROCEDURE — 36415 COLL VENOUS BLD VENIPUNCTURE: CPT | Performed by: INTERNAL MEDICINE

## 2022-07-18 PROCEDURE — 84484 ASSAY OF TROPONIN QUANT: CPT | Performed by: INTERNAL MEDICINE

## 2022-07-18 PROCEDURE — ND001 PR NO DOCUMENTATION: Performed by: INTERNAL MEDICINE

## 2022-07-18 PROCEDURE — 85025 COMPLETE CBC W/AUTO DIFF WBC: CPT | Performed by: INTERNAL MEDICINE

## 2022-07-18 PROCEDURE — 80048 BASIC METABOLIC PNL TOTAL CA: CPT | Performed by: INTERNAL MEDICINE

## 2022-07-18 PROCEDURE — 93010 ELECTROCARDIOGRAM REPORT: CPT | Performed by: INTERNAL MEDICINE

## 2022-07-18 PROCEDURE — 96375 TX/PRO/DX INJ NEW DRUG ADDON: CPT

## 2022-07-18 PROCEDURE — 71045 X-RAY EXAM CHEST 1 VIEW: CPT

## 2022-07-18 PROCEDURE — 99284 EMERGENCY DEPT VISIT MOD MDM: CPT

## 2022-07-18 PROCEDURE — 96374 THER/PROPH/DIAG INJ IV PUSH: CPT

## 2022-07-18 PROCEDURE — 81025 URINE PREGNANCY TEST: CPT | Performed by: INTERNAL MEDICINE

## 2022-07-18 PROCEDURE — 93005 ELECTROCARDIOGRAM TRACING: CPT

## 2022-07-18 RX ORDER — DEXAMETHASONE SODIUM PHOSPHATE 4 MG/ML
8 INJECTION, SOLUTION INTRA-ARTICULAR; INTRALESIONAL; INTRAMUSCULAR; INTRAVENOUS; SOFT TISSUE ONCE
Status: COMPLETED | OUTPATIENT
Start: 2022-07-18 | End: 2022-07-18

## 2022-07-18 RX ORDER — PREDNISONE 20 MG/1
TABLET ORAL
Qty: 15 TABLET | Refills: 0 | Status: SHIPPED | OUTPATIENT
Start: 2022-07-18 | End: 2022-07-26 | Stop reason: ALTCHOICE

## 2022-07-18 RX ORDER — ETONOGESTREL AND ETHINYL ESTRADIOL 11.7; 2.7 MG/1; MG/1
INSERT, EXTENDED RELEASE VAGINAL
Qty: 3 EACH | Refills: 0 | Status: SHIPPED | OUTPATIENT
Start: 2022-07-18 | End: 2023-07-22

## 2022-07-18 RX ORDER — KETOROLAC TROMETHAMINE 30 MG/ML
15 INJECTION, SOLUTION INTRAMUSCULAR; INTRAVENOUS ONCE
Status: COMPLETED | OUTPATIENT
Start: 2022-07-18 | End: 2022-07-18

## 2022-07-18 RX ORDER — SODIUM CHLORIDE 9 MG/ML
3 INJECTION INTRAVENOUS
Status: DISCONTINUED | OUTPATIENT
Start: 2022-07-18 | End: 2022-07-18 | Stop reason: HOSPADM

## 2022-07-18 RX ADMIN — DEXAMETHASONE SODIUM PHOSPHATE 8 MG: 4 INJECTION, SOLUTION INTRAMUSCULAR; INTRAVENOUS at 17:29

## 2022-07-18 RX ADMIN — KETOROLAC TROMETHAMINE 15 MG: 30 INJECTION, SOLUTION INTRAMUSCULAR at 16:27

## 2022-07-18 NOTE — ED PROVIDER NOTES
History  No chief complaint on file  36-year-old female presents emergency room with chief complaint of this is a flare-up of my fibromyalgia and costochondritis  The patient reports chest pain across the mid sternum worse with deep inspiration, reproducible  The patient states the chest pain that started about a week ago and is worsening  Patient started herself on prednisone 20 mg daily and states his does not seem to be taking the pain away  The chest pain radiated [place] cross the precordium does not radiate to her neck or down her arm or to her back  She denies shortness of breath with the chest pain  Chest pain does seem to get worse when lying flat in the evening  There is some relief with sitting up  Patient denies any fever chills, abdominal pain nausea vomiting diarrhea constipation  Patient is on birth control she states she spotted about 2 weeks ago  Prior to Admission Medications   Prescriptions Last Dose Informant Patient Reported? Taking? ALPRAZolam (XANAX) 0 5 mg tablet  Self No No   Sig: Take 1 tablet (0 5 mg total) by mouth as needed in the morning and 1 tablet (0 5 mg total) as needed in the evening for anxiety  ARIPiprazole (ABILIFY) 2 mg tablet  Self No No   Sig: Take 1 tablet (2 mg total) by mouth daily   Aimovig 140 MG/ML SOAJ  Self No No   Sig: Inject 140mg under the skin every 30 (thirty) days  B-D 3CC LUER-DEANN SYR 25GX1" 25G X 1" 3 ML MISC  Self Yes No   Sig:     Syringe/Needle, Disp, (SYRINGE 3CC/23JC1-8/4") 27G X 1-1/4" 3 ML MISC  Self No No   Sig: Use for IM injection of ketorolac  buPROPion (WELLBUTRIN XL) 300 mg 24 hr tablet  Self No No   Sig: Take 1 tablet (300 mg total) by mouth in the morning     dicyclomine (BENTYL) 20 mg tablet   No No   Sig: Take 1 tablet (20 mg total) by mouth 2 (two) times a day   diphenoxylate-atropine (LOMOTIL) 2 5-0 025 mg per tablet  Self No No   Si tablet 4 times daily as needed for diarrhea   escitalopram (LEXAPRO) 20 mg tablet  Self No No   Sig: Take 1 tablet (20 mg total) by mouth in the morning    etonogestrel-ethinyl estradiol (NuvaRing) 0 12-0 015 MG/24HR vaginal ring   No No   Sig: Insert ring for 21 days then remove for one week  indomethacin (INDOCIN) 25 mg capsule  Self No No   Si tab BID prn severe headache with food  Hold toradol    ketorolac (TORADOL) 10 mg tablet  Self No No   Sig: Take 1 tablet (10 mg total) by mouth every 6 (six) hours as needed (migraine) Max 2-3 per week  metFORMIN (GLUCOPHAGE-XR) 500 mg 24 hr tablet  Self No No   Sig: Take 2 tablets (1,000 mg total) by mouth daily with breakfast   Patient not taking: Reported on 2022   metoclopramide (Reglan) 10 mg tablet  Self No No   Sig: Take 1 tablet (10 mg total) by mouth every 6 (six) hours   olmesartan (BENICAR) 20 mg tablet  Self No No   Sig: Take 1 tablet (20 mg total) by mouth in the morning  onabotulinumtoxin A (BOTOX) 100 units  Self Yes No   Sig: Inject as directed   ondansetron (ZOFRAN) 4 mg tablet   No No   Sig: Take 1 tablet (4 mg total) by mouth every 6 (six) hours   pantoprazole (PROTONIX) 40 mg tablet   No No   Sig: Take 1 tablet (40 mg total) by mouth daily      Facility-Administered Medications Last Administration Doses Remaining   cyanocobalamin injection 1,000 mcg 8/3/2020  8:59 AM    cyanocobalamin injection 1,000 mcg 2020 11:46 AM    cyanocobalamin injection 1,000 mcg 2020 11:29 AM           Past Medical History:   Diagnosis Date    Abnormal Pap smear of cervix     Allergic     Anxiety     Arthritis     Depression     Diabetes mellitus ([address] )     Fibromyalgia, primary     Hypertension     IBS (irritable bowel syndrome)     Migraine     Obesity     Vitamin B12 deficiency        Past Surgical History:   Procedure Laterality Date    COLONOSCOPY N/A 2018    Procedure: COLONOSCOPY;  Surgeon: Johann Mcintyre MD;  Location: [facility];   Service: Gastroenterology    DE EXC SKIN BENIG >4 CM REMAINDR BODY N/A 7/1/2021    Procedure: EXCISION OF PILAR SCALP CYST X 2 AND RIGHT INNER GROIN NEVVUS;  Surgeon: Aurelio Quintana MD;  Location:  MAIN OR;  Service: Plastics    AZ RECMPL WND SCALP,EXTR 2 6-7 5 CM N/A 7/1/2021    Procedure: CLOSURE WOUND SCALP X 2 AND RIGHT INNER GROIN;  Surgeon: Aurelio Quintana MD;  Location:  MAIN OR;  Service: Plastics    TONSILLECTOMY      WISDOM TOOTH EXTRACTION         Family History   Problem Relation Age of Onset    Rheum arthritis Mother     Psoriasis Mother     Other Mother     Hypertension Mother     Diabetes unspecified Mother     Sjogren's syndrome Mother     Alcohol abuse Mother     Drug abuse Mother     Anxiety disorder Father     Alcohol abuse Father     Lung cancer Maternal Grandfather     Cancer Other         bone    Diabetes Other     Other Other         High blood pressure    Depression Maternal Grandmother      I have reviewed and agree with the history as documented  E-Cigarette/Vaping    E-Cigarette Use Former User     Start Date 7/1/19     Comments 1 cartridge every 2 months       E-Cigarette/Vaping Substances    Nicotine No     THC Yes     CBD Yes     Flavoring No     Other No     Unknown No      Social History     Tobacco Use    Smoking status: Never Smoker    Smokeless tobacco: Never Used   Vaping Use    Vaping Use: Former    Start date: 7/1/2019    Substances: THC, CBD   Substance Use Topics    Alcohol use: Yes     Comment: rarely    Drug use: Yes     Frequency: 2 0 times per week     Types: Marijuana     Comment: medical marijuana (vape)       Review of Systems    Physical Exam  Physical Exam    Vital Signs  ED Triage Vitals   Temp Pulse Resp BP SpO2   -- -- -- -- --      Temp src Heart Rate Source Patient Position - Orthostatic VS BP Location FiO2 (%)   -- -- -- -- --      Pain Score       --           There were no vitals filed for this visit        Visual Acuity      ED Medications  Medications - No data to display    Diagnostic Studies  Results Reviewed     None                 No orders to display              Procedures  ECG 12 Lead Documentation Only    Date/Time: 7/18/2022 4:30 PM  Performed by: Pritesh Avalos MD  Authorized by: Pritesh Avalos MD     Indications / Diagnosis:  Atypical chest  ECG reviewed by me, the ED Provider: yes    Patient location:  ED  Previous ECG:     Previous ECG:  Compared to current    Similarity:  No change  Interpretation:     Interpretation: normal    Rate:     ECG rate:  80    ECG rate assessment: normal    Rhythm:     Rhythm: sinus rhythm    Ectopy:     Ectopy: none    QRS:     QRS axis:  Normal  Conduction:     Conduction: normal    ST segments:     ST segments:  Normal  T waves:     T waves: normal               ED Course                                             MDM    Disposition  Final diagnoses:   None     ED Disposition     None      Follow-up Information    None         Patient's Medications   Discharge Prescriptions    No medications on file       No discharge procedures on file      PDMP Review       Value Time User    PDMP Reviewed  Yes 5/18/2022 10:34 AM Roshni Amaro MD          ED Provider  Electronically Signed by           Pritesh Avalos MD  07/18/22 0843

## 2022-07-18 NOTE — TELEPHONE ENCOUNTER
Pt called in had to reschedule her yearly appt , took next available slot 8/8/22  Would like a refill of birth control sent to pharmacy on file  Please review thank you

## 2022-07-24 ENCOUNTER — HOSPITAL ENCOUNTER (EMERGENCY)
Facility: HOSPITAL | Age: 32
Discharge: HOME | End: 2022-07-24
Attending: EMERGENCY MEDICINE
Payer: COMMERCIAL

## 2022-07-24 VITALS
TEMPERATURE: 98 F | SYSTOLIC BLOOD PRESSURE: 130 MMHG | DIASTOLIC BLOOD PRESSURE: 75 MMHG | HEIGHT: 63 IN | RESPIRATION RATE: 18 BRPM | OXYGEN SATURATION: 98 % | BODY MASS INDEX: 41.99 KG/M2 | HEART RATE: 90 BPM | WEIGHT: 237 LBS

## 2022-07-24 DIAGNOSIS — G43.909 MIGRAINE HEADACHE: Primary | ICD-10-CM

## 2022-07-24 PROCEDURE — 96374 THER/PROPH/DIAG INJ IV PUSH: CPT

## 2022-07-24 PROCEDURE — 96375 TX/PRO/DX INJ NEW DRUG ADDON: CPT

## 2022-07-24 PROCEDURE — 99284 EMERGENCY DEPT VISIT MOD MDM: CPT | Performed by: EMERGENCY MEDICINE

## 2022-07-24 PROCEDURE — 96361 HYDRATE IV INFUSION ADD-ON: CPT

## 2022-07-24 PROCEDURE — 99283 EMERGENCY DEPT VISIT LOW MDM: CPT

## 2022-07-24 RX ORDER — DIPHENHYDRAMINE HYDROCHLORIDE 50 MG/ML
25 INJECTION INTRAMUSCULAR; INTRAVENOUS ONCE
Status: COMPLETED | OUTPATIENT
Start: 2022-07-24 | End: 2022-07-24

## 2022-07-24 RX ORDER — KETOROLAC TROMETHAMINE 30 MG/ML
15 INJECTION, SOLUTION INTRAMUSCULAR; INTRAVENOUS ONCE
Status: COMPLETED | OUTPATIENT
Start: 2022-07-24 | End: 2022-07-24

## 2022-07-24 RX ORDER — METOCLOPRAMIDE HYDROCHLORIDE 5 MG/ML
10 INJECTION INTRAMUSCULAR; INTRAVENOUS ONCE
Status: COMPLETED | OUTPATIENT
Start: 2022-07-24 | End: 2022-07-24

## 2022-07-24 RX ORDER — DEXAMETHASONE SODIUM PHOSPHATE 4 MG/ML
8 INJECTION, SOLUTION INTRA-ARTICULAR; INTRALESIONAL; INTRAMUSCULAR; INTRAVENOUS; SOFT TISSUE ONCE
Status: COMPLETED | OUTPATIENT
Start: 2022-07-24 | End: 2022-07-24

## 2022-07-24 RX ADMIN — DEXAMETHASONE SODIUM PHOSPHATE 8 MG: 4 INJECTION, SOLUTION INTRAMUSCULAR; INTRAVENOUS at 11:49

## 2022-07-24 RX ADMIN — KETOROLAC TROMETHAMINE 15 MG: 30 INJECTION, SOLUTION INTRAMUSCULAR at 11:48

## 2022-07-24 RX ADMIN — SODIUM CHLORIDE 500 ML: 0.9 INJECTION, SOLUTION INTRAVENOUS at 11:56

## 2022-07-24 RX ADMIN — DIPHENHYDRAMINE HYDROCHLORIDE 25 MG: 50 INJECTION, SOLUTION INTRAMUSCULAR; INTRAVENOUS at 11:50

## 2022-07-24 RX ADMIN — METOCLOPRAMIDE HYDROCHLORIDE 10 MG: 5 INJECTION INTRAMUSCULAR; INTRAVENOUS at 11:48

## 2022-07-24 NOTE — ED PROVIDER NOTES
History  Chief Complaint   Patient presents with    Migraine     Pt presents with HA x 3 days with nausea, light sensitivity  No relief with prescription medications     HPI      This is a very pleasant, nontoxic, 80-year-old female, right-hand dominant, mildly obese,  female with a history of fibromyalgia, presents with a 3 day history of having a headache consistent with her normal migraines  Last neuroimaging was in 2022 which was an MRI of the brain without contrast which showed no acute abnormalities  Patient reports his normal headache for her  Patient reports mild nausea without vomiting  No history of recent trauma, no fevers, no visual changes,  Prior to Admission Medications   Prescriptions Last Dose Informant Patient Reported? Taking? ALPRAZolam (XANAX) 0 5 mg tablet  Self No No   Sig: Take 1 tablet (0 5 mg total) by mouth as needed in the morning and 1 tablet (0 5 mg total) as needed in the evening for anxiety  ARIPiprazole (ABILIFY) 2 mg tablet  Self No No   Sig: Take 1 tablet (2 mg total) by mouth daily   Aimovig 140 MG/ML SOAJ  Self No No   Sig: Inject 140mg under the skin every 30 (thirty) days  B-D 3CC LUER-DEANN SYR 25GX1" 25G X 1" 3 ML MISC  Self Yes No   Sig:     Syringe/Needle, Disp, (SYRINGE 3CC/46BM1-6/4") 27G X 1-1/4" 3 ML MISC  Self No No   Sig: Use for IM injection of ketorolac  buPROPion (WELLBUTRIN XL) 300 mg 24 hr tablet  Self No No   Sig: Take 1 tablet (300 mg total) by mouth in the morning     dicyclomine (BENTYL) 20 mg tablet   No No   Sig: Take 1 tablet (20 mg total) by mouth 2 (two) times a day   diphenoxylate-atropine (LOMOTIL) 2 5-0 025 mg per tablet  Self No No   Si tablet 4 times daily as needed for diarrhea   escitalopram (LEXAPRO) 20 mg tablet  Self No No   Sig: Take 1 tablet (20 mg total) by mouth in the morning    etonogestrel-ethinyl estradiol (NuvaRing) 0 12-0 015 MG/24HR vaginal ring   No No   Sig: Insert ring for 21 days then remove for one week  indomethacin (INDOCIN) 25 mg capsule  Self No No   Si tab BID prn severe headache with food  Hold toradol    ketorolac (TORADOL) 10 mg tablet  Self No No   Sig: Take 1 tablet (10 mg total) by mouth every 6 (six) hours as needed (migraine) Max 2-3 per week  metFORMIN (GLUCOPHAGE-XR) 500 mg 24 hr tablet  Self No No   Sig: Take 2 tablets (1,000 mg total) by mouth daily with breakfast   Patient not taking: Reported on 2022   metoclopramide (Reglan) 10 mg tablet  Self No No   Sig: Take 1 tablet (10 mg total) by mouth every 6 (six) hours   olmesartan (BENICAR) 20 mg tablet  Self No No   Sig: Take 1 tablet (20 mg total) by mouth in the morning  onabotulinumtoxin A (BOTOX) 100 units  Self Yes No   Sig: Inject as directed   ondansetron (ZOFRAN) 4 mg tablet   No No   Sig: Take 1 tablet (4 mg total) by mouth every 6 (six) hours   pantoprazole (PROTONIX) 40 mg tablet   No No   Sig: Take 1 tablet (40 mg total) by mouth daily   predniSONE 20 mg tablet   No No   Sig: Take 2 tablets (40 mg total) by mouth daily for 5 days, THEN 1 tablet (20 mg total) daily for 5 days  Facility-Administered Medications Last Administration Doses Remaining   cyanocobalamin injection 1,000 mcg 8/3/2020  8:59 AM    cyanocobalamin injection 1,000 mcg 2020 11:46 AM    cyanocobalamin injection 1,000 mcg 2020 11:29 AM           Past Medical History:   Diagnosis Date    Abnormal Pap smear of cervix     Allergic     Anxiety     Arthritis     Depression     Diabetes mellitus ([address] )     Fibromyalgia, primary     Hypertension     IBS (irritable bowel syndrome)     Migraine     Obesity     Vitamin B12 deficiency        Past Surgical History:   Procedure Laterality Date    COLONOSCOPY N/A 2018    Procedure: COLONOSCOPY;  Surgeon: Christiana Quintero MD;  Location: [facility];   Service: Gastroenterology    IA EXC SKIN BENIG >4 CM REMAINDR BODY N/A 2021    Procedure: EXCISION OF PILAR SCALP CYST X 2 AND RIGHT INNER GROIN NEVVUS;  Surgeon: Og Boyd MD;  Location:  MAIN OR;  Service: Plastics    MN RECMPL WND SCALP,EXTR 2 6-7 5 CM N/A 7/1/2021    Procedure: CLOSURE WOUND SCALP X 2 AND RIGHT INNER GROIN;  Surgeon: Og Boyd MD;  Location: [address] [place] [address] MAIN OR;  Service: Plastics    TONSILLECTOMY      WISDOM TOOTH EXTRACTION         Family History   Problem Relation Age of Onset    Rheum arthritis Mother     Psoriasis Mother     Other Mother     Hypertension Mother     Diabetes unspecified Mother     Sjogren's syndrome Mother     Alcohol abuse Mother     Drug abuse Mother     Anxiety disorder Father     Alcohol abuse Father     Lung cancer Maternal Grandfather     Cancer Other         bone    Diabetes Other     Other Other         High blood pressure    Depression Maternal Grandmother      I have reviewed and agree with the history as documented  E-Cigarette/Vaping    E-Cigarette Use Former User     Start Date 7/1/19     Comments 1 cartridge every 2 months       E-Cigarette/Vaping Substances    Nicotine No     THC Yes     CBD Yes     Flavoring No     Other No     Unknown No      Social History     Tobacco Use    Smoking status: Never Smoker    Smokeless tobacco: Never Used   Vaping Use    Vaping Use: Former    Start date: 7/1/2019    Substances: THC, CBD   Substance Use Topics    Alcohol use: Yes     Comment: rarely    Drug use: Yes     Frequency: 2 0 times per week     Types: Marijuana     Comment: medical marijuana (vape)       Review of Systems   Constitutional: Negative  HENT: Negative  Eyes: Negative  Respiratory: Negative  Cardiovascular: Negative  Gastrointestinal: Negative  Endocrine: Negative  Genitourinary: Negative  Musculoskeletal: Negative  Allergic/Immunologic: Negative  Neurological: Negative  Hematological: Negative  Psychiatric/Behavioral: Negative  Physical Exam  Physical Exam  Vitals and nursing note reviewed  Constitutional:       Appearance: Normal appearance  She is normal weight  HENT:      Head: Normocephalic and atraumatic  Right Ear: External ear normal       Left Ear: External ear normal       Nose: Nose normal       Mouth/Throat:      Mouth: Mucous membranes are moist       Pharynx: Oropharynx is clear  Eyes:      Extraocular Movements: Extraocular movements intact  Conjunctiva/sclera: Conjunctivae normal       Pupils: Pupils are equal, round, and reactive to light  Cardiovascular:      Rate and Rhythm: Normal rate and regular rhythm  Pulses: Normal pulses  Heart sounds: Normal heart sounds  Pulmonary:      Effort: Pulmonary effort is normal       Breath sounds: Normal breath sounds  Abdominal:      General: Abdomen is flat  Bowel sounds are normal    Musculoskeletal:         General: Normal range of motion  Skin:     General: Skin is warm  Capillary Refill: Capillary refill takes less than 2 seconds  Neurological:      General: No focal deficit present  Mental Status: She is alert and oriented to person, place, and time  Mental status is at baseline  Psychiatric:         Mood and Affect: Mood normal          Behavior: Behavior normal          Thought Content:  Thought content normal          Judgment: Judgment normal          Vital Signs  ED Triage Vitals [07/24/22 1127]   Temperature Pulse Respirations Blood Pressure SpO2   98 °F (36 7 °C) 100 18 130/75 95 %      Temp Source Heart Rate Source Patient Position - Orthostatic VS BP Location FiO2 (%)   Oral Monitor Lying Left arm --      Pain Score       7           Vitals:    07/24/22 1127 07/24/22 1234   BP: 130/75    Pulse: 100 90   Patient Position - Orthostatic VS: Lying          Visual Acuity  Visual Acuity    Flowsheet Row Most Recent Value   L Pupil Size (mm) 2   R Pupil Size (mm) 2          ED Medications  Medications   dexamethasone (DECADRON) injection 8 mg (8 mg Intravenous Given 7/24/22 1149)   ketorolac (TORADOL) injection 15 mg (15 mg Intravenous Given 7/24/22 1148)   diphenhydrAMINE (BENADRYL) injection 25 mg (25 mg Intravenous Given 7/24/22 1150)   metoclopramide (REGLAN) injection 10 mg (10 mg Intravenous Given 7/24/22 1148)   sodium chloride 0 9 % bolus 500 mL (0 mL Intravenous Stopped 7/24/22 1253)       Diagnostic Studies  Results Reviewed     None                 No orders to display              Procedures  Procedures         ED Course  ED Course as of 07/24/22 1259   Sun Jul 24, 2022   1235 Patient reassessed, patient feeling better after 300 cc of normal saline infused through the left [facility] along with Toradol, Benadryl, Reglan and Decadron  Patient pain is down to a 3/10, patient is stable for discharge  MDM  Number of Diagnoses or Management Options  Migraine headache  Diagnosis management comments: 36-year-old female presents the emergency department a longstanding history of fibromyalgia and chronic migraines presents the emergency department with a headache that is typical of her migraines no history of trauma, prior neuro imaging within normal limits, after IV fluids, Toradol, Benadryl, Reglan and Decadron patient's headache is nearly resolved, no SAH risk factors  Patient stable for discharge  Portions of the record may have been created with voice recognition software  Occasional wrong word or "sound a like" substitutions may have occurred due to the inherent limitations of voice recognition software  Read the chart carefully and recognize, using context, where substitutions have occurred  Counseling: I had a detailed discussion with the patient and/or guardian regarding: the historical points, exam findings, and any diagnostic results supporting the discharge diagnosis, lab results, radiology results, discharge instructions reviewed with patient and/or family/caregiver and understanding was verbalized   Instructions given to return to the emergency department if symptoms worsen or persist, or if there are any questions or concerns that arise at home             Amount and/or Complexity of Data Reviewed  Decide to obtain previous medical records or to obtain history from someone other than the patient: yes  Review and summarize past medical records: yes  Independent visualization of images, tracings, or specimens: yes        Disposition  Final diagnoses:   Migraine headache     Time reflects when diagnosis was documented in both MDM as applicable and the Disposition within this note     Time User Action Codes Description Comment    7/24/2022 12:36 PM Nancy Valdez Add [[id number] [id number]] Migraine headache       ED Disposition     ED Disposition   Discharge    Condition   Stable    Date/Time   Sun Jul 24, 2022 12:36 PM    Comment   Kaden Holman discharge to home/self care  Follow-up Information     Follow up With Specialties Details Why [address] , DO Family Medicine   [address] [place] [address]  [phone]            Discharge Medication List as of 7/24/2022 12:39 PM      CONTINUE these medications which have NOT CHANGED    Details   Aimovig 140 MG/ML SOAJ Inject 140mg under the skin every 30 (thirty) days  , Normal      ALPRAZolam (XANAX) 0 5 mg tablet Take 1 tablet (0 5 mg total) by mouth as needed in the morning and 1 tablet (0 5 mg total) as needed in the evening for anxiety  , Starting Wed 5/18/2022, Normal      ARIPiprazole (ABILIFY) 2 mg tablet Take 1 tablet (2 mg total) by mouth daily, Starting Tue 5/10/2022, Normal      !! B-D 3CC LUER-DEANN SYR 25GX1" 25G X 1" 3 ML MISC  , Starting Thu 7/26/2018, Historical Med      buPROPion (WELLBUTRIN XL) 300 mg 24 hr tablet Take 1 tablet (300 mg total) by mouth in the morning , Starting Wed 5/18/2022, Normal      dicyclomine (BENTYL) 20 mg tablet Take 1 tablet (20 mg total) by mouth 2 (two) times a day, Starting Fri 5/27/2022, Normal      diphenoxylate-atropine (LOMOTIL) 2 5-0 025 mg per tablet 1 tablet 4 times daily as needed for diarrhea, Normal      escitalopram (LEXAPRO) 20 mg tablet Take 1 tablet (20 mg total) by mouth in the morning , Starting Wed 5/18/2022, Normal      etonogestrel-ethinyl estradiol (NuvaRing) 0 12-0 015 MG/24HR vaginal ring Insert ring for 21 days then remove for one week , Normal      indomethacin (INDOCIN) 25 mg capsule 1 tab BID prn severe headache with food  Hold toradol , Normal      ketorolac (TORADOL) 10 mg tablet Take 1 tablet (10 mg total) by mouth every 6 (six) hours as needed (migraine) Max 2-3 per week , Starting Tue 3/22/2022, Normal      metFORMIN (GLUCOPHAGE-XR) 500 mg 24 hr tablet Take 2 tablets (1,000 mg total) by mouth daily with breakfast, Starting Tue 5/10/2022, Normal      metoclopramide (Reglan) 10 mg tablet Take 1 tablet (10 mg total) by mouth every 6 (six) hours, Starting Sat 5/21/2022, Normal      olmesartan (BENICAR) 20 mg tablet Take 1 tablet (20 mg total) by mouth in the morning , Starting Mon 5/23/2022, Normal      onabotulinumtoxin A (BOTOX) 100 units Inject as directed, Starting Tue 8/29/2017, Historical Med      ondansetron (ZOFRAN) 4 mg tablet Take 1 tablet (4 mg total) by mouth every 6 (six) hours, Starting Fri 5/27/2022, Normal      pantoprazole (PROTONIX) 40 mg tablet Take 1 tablet (40 mg total) by mouth daily, Starting Fri 5/27/2022, Normal      predniSONE 20 mg tablet Multiple Dosages:Starting Mon 7/18/2022, Until Fri 7/22/2022 at 2359, THEN Starting Sat 7/23/2022, Until Wed 7/27/2022 at 2359Take 2 tablets (40 mg total) by mouth daily for 5 days, THEN 1 tablet (20 mg total) daily for 5 days  , Normal      !! Syringe/Needle, Disp, (SYRINGE 3CC/43ZU1-9/4") 27G X 1-1/4" 3 ML MISC Use for IM injection of ketorolac , Normal       !! - Potential duplicate medications found  Please discuss with provider  No discharge procedures on file      PDMP Review       Value Time User    PDMP Reviewed  Yes 5/18/2022 10:34 AM Aliyah Debbi Perez MD          ED Provider  Electronically Signed by           Jeny Simms III,   07/24/22 1300

## 2022-07-25 DIAGNOSIS — G43.711 INTRACTABLE CHRONIC MIGRAINE WITHOUT AURA AND WITH STATUS MIGRAINOSUS: Primary | ICD-10-CM

## 2022-07-25 DIAGNOSIS — G43.709 CHRONIC MIGRAINE WITHOUT AURA WITHOUT STATUS MIGRAINOSUS, NOT INTRACTABLE: ICD-10-CM

## 2022-07-26 ENCOUNTER — HOSPITAL ENCOUNTER (EMERGENCY)
Facility: HOSPITAL | Age: 32
Discharge: HOME | End: 2022-07-26
Attending: EMERGENCY MEDICINE
Payer: COMMERCIAL

## 2022-07-26 ENCOUNTER — APPOINTMENT (EMERGENCY)
Dept: CT IMAGING | Facility: HOSPITAL | Age: 32
End: 2022-07-26
Payer: COMMERCIAL

## 2022-07-26 ENCOUNTER — TELEPHONE (OUTPATIENT)
Dept: NEUROLOGY | Facility: CLINIC | Age: 32
End: 2022-07-26

## 2022-07-26 ENCOUNTER — APPOINTMENT (EMERGENCY)
Dept: RADIOLOGY | Facility: HOSPITAL | Age: 32
End: 2022-07-26
Payer: COMMERCIAL

## 2022-07-26 ENCOUNTER — PROCEDURE VISIT (OUTPATIENT)
Dept: NEUROLOGY | Facility: CLINIC | Age: 32
End: 2022-07-26
Payer: COMMERCIAL

## 2022-07-26 VITALS — SYSTOLIC BLOOD PRESSURE: 133 MMHG | DIASTOLIC BLOOD PRESSURE: 82 MMHG | TEMPERATURE: 98 F | HEART RATE: 95 BPM

## 2022-07-26 VITALS
BODY MASS INDEX: 41.99 KG/M2 | WEIGHT: 237 LBS | HEART RATE: 91 BPM | TEMPERATURE: 98.8 F | OXYGEN SATURATION: 97 % | DIASTOLIC BLOOD PRESSURE: 75 MMHG | SYSTOLIC BLOOD PRESSURE: 133 MMHG | HEIGHT: 63 IN | RESPIRATION RATE: 18 BRPM

## 2022-07-26 DIAGNOSIS — R53.1 GENERALIZED WEAKNESS: Primary | ICD-10-CM

## 2022-07-26 DIAGNOSIS — G43.709 CHRONIC MIGRAINE WITHOUT AURA WITHOUT STATUS MIGRAINOSUS, NOT INTRACTABLE: Primary | ICD-10-CM

## 2022-07-26 DIAGNOSIS — R25.1 TREMOR: ICD-10-CM

## 2022-07-26 LAB
ALBUMIN SERPL BCP-MCNC: 3.8 G/DL (ref 3.5–5)
ALP SERPL-CCNC: 63 U/L (ref 34–104)
ALT SERPL W P-5'-P-CCNC: 11 U/L (ref 7–52)
ANION GAP SERPL CALCULATED.3IONS-SCNC: 10 MMOL/L (ref 4–13)
APAP SERPL-MCNC: <10 UG/ML (ref 10–20)
APTT PPP: 25 SECONDS (ref 23–37)
AST SERPL W P-5'-P-CCNC: 11 U/L (ref 13–39)
BACTERIA UR QL AUTO: NORMAL /HPF
BASOPHILS # BLD MANUAL: 0 THOUSAND/UL (ref 0–0.1)
BASOPHILS NFR MAR MANUAL: 0 % (ref 0–1)
BILIRUB SERPL-MCNC: 0.35 MG/DL (ref 0.2–1)
BILIRUB UR QL STRIP: NEGATIVE
BUN SERPL-MCNC: 10 MG/DL (ref 5–25)
CALCIUM SERPL-MCNC: 9.5 MG/DL (ref 8.4–10.2)
CHLORIDE SERPL-SCNC: 101 MMOL/L (ref 96–108)
CLARITY UR: CLEAR
CO2 SERPL-SCNC: 26 MMOL/L (ref 21–32)
COLOR UR: YELLOW
CREAT SERPL-MCNC: 0.77 MG/DL (ref 0.6–1.3)
EOSINOPHIL # BLD MANUAL: 0.15 THOUSAND/UL (ref 0–0.4)
EOSINOPHIL NFR BLD MANUAL: 1 % (ref 0–6)
ERYTHROCYTE [DISTWIDTH] IN BLOOD BY AUTOMATED COUNT: 13.8 % (ref 11.6–15.1)
ETHANOL SERPL-MCNC: <10 MG/DL
GFR SERPL CREATININE-BSD FRML MDRD: 103 ML/MIN/1.73SQ M
GLUCOSE SERPL-MCNC: 81 MG/DL (ref 65–140)
GLUCOSE UR STRIP-MCNC: NEGATIVE MG/DL
HCT VFR BLD AUTO: 38.1 % (ref 34.8–46.1)
HGB BLD-MCNC: 12.1 G/DL (ref 11.5–15.4)
HGB UR QL STRIP.AUTO: NEGATIVE
INR PPP: 0.97 (ref 0.84–1.19)
KETONES UR STRIP-MCNC: NEGATIVE MG/DL
LEUKOCYTE ESTERASE UR QL STRIP: ABNORMAL
LYMPHOCYTES # BLD AUTO: 45 % (ref 14–44)
LYMPHOCYTES # BLD AUTO: 6.77 THOUSAND/UL (ref 0.6–4.47)
MCH RBC QN AUTO: 28.4 PG (ref 26.8–34.3)
MCHC RBC AUTO-ENTMCNC: 31.8 G/DL (ref 31.4–37.4)
MCV RBC AUTO: 89 FL (ref 82–98)
MONOCYTES # BLD AUTO: 0.6 THOUSAND/UL (ref 0–1.22)
MONOCYTES NFR BLD: 4 % (ref 4–12)
NEUTROPHILS # BLD MANUAL: 7.53 THOUSAND/UL (ref 1.85–7.62)
NEUTS SEG NFR BLD AUTO: 50 % (ref 43–75)
NITRITE UR QL STRIP: NEGATIVE
NON-SQ EPI CELLS URNS QL MICRO: NORMAL /HPF
PH UR STRIP.AUTO: 6 [PH]
PLATELET # BLD AUTO: 523 THOUSANDS/UL (ref 149–390)
PLATELET BLD QL SMEAR: ABNORMAL
PMV BLD AUTO: 8.7 FL (ref 8.9–12.7)
POTASSIUM SERPL-SCNC: 4.2 MMOL/L (ref 3.5–5.3)
PROT SERPL-MCNC: 6.9 G/DL (ref 6.4–8.4)
PROT UR STRIP-MCNC: NEGATIVE MG/DL
PROTHROMBIN TIME: 12.9 SECONDS (ref 11.6–14.5)
RBC # BLD AUTO: 4.26 MILLION/UL (ref 3.81–5.12)
RBC #/AREA URNS AUTO: NORMAL /HPF
RBC MORPH BLD: NORMAL
SALICYLATES SERPL-MCNC: <5 MG/DL (ref 3–20)
SARS-COV-2 RNA RESP QL NAA+PROBE: NEGATIVE
SODIUM SERPL-SCNC: 137 MMOL/L (ref 135–147)
SP GR UR STRIP.AUTO: 1.02 (ref 1–1.03)
UROBILINOGEN UR QL STRIP.AUTO: 0.2 E.U./DL
WBC # BLD AUTO: 15.05 THOUSAND/UL (ref 4.31–10.16)
WBC #/AREA URNS AUTO: NORMAL /HPF

## 2022-07-26 PROCEDURE — 85007 BL SMEAR W/DIFF WBC COUNT: CPT | Performed by: EMERGENCY MEDICINE

## 2022-07-26 PROCEDURE — 82077 ASSAY SPEC XCP UR&BREATH IA: CPT | Performed by: EMERGENCY MEDICINE

## 2022-07-26 PROCEDURE — 85027 COMPLETE CBC AUTOMATED: CPT | Performed by: EMERGENCY MEDICINE

## 2022-07-26 PROCEDURE — 87635 SARS-COV-2 COVID-19 AMP PRB: CPT | Performed by: EMERGENCY MEDICINE

## 2022-07-26 PROCEDURE — 80053 COMPREHEN METABOLIC PANEL: CPT | Performed by: EMERGENCY MEDICINE

## 2022-07-26 PROCEDURE — 85610 PROTHROMBIN TIME: CPT | Performed by: EMERGENCY MEDICINE

## 2022-07-26 PROCEDURE — 85730 THROMBOPLASTIN TIME PARTIAL: CPT | Performed by: EMERGENCY MEDICINE

## 2022-07-26 PROCEDURE — 36415 COLL VENOUS BLD VENIPUNCTURE: CPT | Performed by: EMERGENCY MEDICINE

## 2022-07-26 PROCEDURE — G1004 CDSM NDSC: HCPCS

## 2022-07-26 PROCEDURE — 81003 URINALYSIS AUTO W/O SCOPE: CPT | Performed by: EMERGENCY MEDICINE

## 2022-07-26 PROCEDURE — 70450 CT HEAD/BRAIN W/O DYE: CPT

## 2022-07-26 PROCEDURE — 80143 DRUG ASSAY ACETAMINOPHEN: CPT | Performed by: EMERGENCY MEDICINE

## 2022-07-26 PROCEDURE — 99284 EMERGENCY DEPT VISIT MOD MDM: CPT

## 2022-07-26 PROCEDURE — 64615 CHEMODENERV MUSC MIGRAINE: CPT | Performed by: PHYSICIAN ASSISTANT

## 2022-07-26 PROCEDURE — 99284 EMERGENCY DEPT VISIT MOD MDM: CPT | Performed by: EMERGENCY MEDICINE

## 2022-07-26 PROCEDURE — 71045 X-RAY EXAM CHEST 1 VIEW: CPT

## 2022-07-26 PROCEDURE — 80179 DRUG ASSAY SALICYLATE: CPT | Performed by: EMERGENCY MEDICINE

## 2022-07-26 PROCEDURE — 81001 URINALYSIS AUTO W/SCOPE: CPT | Performed by: EMERGENCY MEDICINE

## 2022-07-26 RX ORDER — DEXAMETHASONE 2 MG/1
TABLET ORAL
Qty: 5 TABLET | Refills: 0 | Status: SHIPPED | OUTPATIENT
Start: 2022-07-26 | End: 2022-08-25

## 2022-07-26 RX ADMIN — SODIUM CHLORIDE 1000 ML: 0.9 INJECTION, SOLUTION INTRAVENOUS at 18:48

## 2022-07-26 NOTE — ED PROVIDER NOTES
History  Chief Complaint   Patient presents with    Medical Problem     Pt presents to ED of just no feeling right, Migraine this AM and also went for Botox injection in the afternoon, took xanax around 1130, per mom pt had slurred speech which has now resolved     Patient is a 62-year-old female with history of fibromyalgia, chronic pain, migraines, presents for evaluation of not feeling right    Patient says that since this morning she has felt slowed she also feels that her body is heavy    Patient says that she went for her Botox injection for her migraines earlier today  She denies a headache, visual changes, but says that she feels like is hard to focus   She denies any fevers, chills, nausea, vomiting, chest pain, shortness breath, abdominal pain  Mom says that earlier today seem like the patient was having some slurred speech and seemed worse after her Botox injections  She denies any slurred speech at this time  She denies any numbness or tingling or weakness in her extremities  Prior to Admission Medications   Prescriptions Last Dose Informant Patient Reported? Taking? ALPRAZolam (XANAX) 0 5 mg tablet 2022 at Unknown time Self No Yes   Sig: Take 1 tablet (0 5 mg total) by mouth as needed in the morning and 1 tablet (0 5 mg total) as needed in the evening for anxiety  ARIPiprazole (ABILIFY) 2 mg tablet 2022 at Unknown time Self No Yes   Sig: Take 1 tablet (2 mg total) by mouth daily   Aimovig 140 MG/ML SOAJ Past Week at Unknown time Self No Yes   Sig: Inject 140mg under the skin every 30 (thirty) days  B-D 3CC LUER-DEANN SYR 25GX1" 25G X 1" 3 ML MISC  Self Yes No   Sig:     Rimegepant Sulfate (NURTEC) 75 MG TBDP Not Taking at Unknown time  No No   Si tab at migraine onset  No more than one dose per 24 hours  Hold triptan     Patient not taking: Reported on 2022   Syringe/Needle, Disp, (SYRINGE 3CC/20WS2-1/4") 27G X 1-1/4" 3 ML MISC  Self No No   Sig: Use for IM injection of ketorolac  buPROPion (WELLBUTRIN XL) 300 mg 24 hr tablet 2022 at Unknown time Self No Yes   Sig: Take 1 tablet (300 mg total) by mouth in the morning  dexamethasone (DECADRON) 2 mg tablet Not Taking at Unknown time  No No   Si tab qam with food prn migraine  Patient not taking: Reported on 2022   dicyclomine (BENTYL) 20 mg tablet 2022 at Unknown time  No Yes   Sig: Take 1 tablet (20 mg total) by mouth 2 (two) times a day   diphenoxylate-atropine (LOMOTIL) 2 5-0 025 mg per tablet Past Week at Unknown time Self No Yes   Si tablet 4 times daily as needed for diarrhea   escitalopram (LEXAPRO) 20 mg tablet 2022 at Unknown time Self No Yes   Sig: Take 1 tablet (20 mg total) by mouth in the morning    etonogestrel-ethinyl estradiol (NuvaRing) 0 12-0 015 MG/24HR vaginal ring   No No   Sig: Insert ring for 21 days then remove for one week  indomethacin (INDOCIN) 25 mg capsule Past Week at Unknown time Self No Yes   Si tab BID prn severe headache with food  Hold toradol    ketorolac (TORADOL) 10 mg tablet Past Month at Unknown time Self No Yes   Sig: Take 1 tablet (10 mg total) by mouth every 6 (six) hours as needed (migraine) Max 2-3 per week  metFORMIN (GLUCOPHAGE-XR) 500 mg 24 hr tablet Not Taking at Unknown time Self No No   Sig: Take 2 tablets (1,000 mg total) by mouth daily with breakfast   Patient not taking: No sig reported   metoclopramide (Reglan) 10 mg tablet Not Taking at Unknown time Self No No   Sig: Take 1 tablet (10 mg total) by mouth every 6 (six) hours   Patient not taking: Reported on 2022   olmesartan (BENICAR) 20 mg tablet 2022 at Unknown time Self No Yes   Sig: Take 1 tablet (20 mg total) by mouth in the morning     onabotulinumtoxin A (BOTOX) 100 units 2022 at Unknown time Self Yes Yes   Sig: Inject as directed   ondansetron (ZOFRAN) 4 mg tablet 2022 at Unknown time  No Yes   Sig: Take 1 tablet (4 mg total) by mouth every 6 (six) hours   pantoprazole (PROTONIX) 40 mg tablet 7/26/2022 at Unknown time  No Yes   Sig: Take 1 tablet (40 mg total) by mouth daily      Facility-Administered Medications Last Administration Doses Remaining   Botulinum Toxin Type A SOLR 200 Units 7/26/2022 10:22 AM 0   cyanocobalamin injection 1,000 mcg 8/3/2020  8:59 AM    cyanocobalamin injection 1,000 mcg 5/18/2020 11:46 AM    cyanocobalamin injection 1,000 mcg 9/1/2020 11:29 AM           Past Medical History:   Diagnosis Date    Abnormal Pap smear of cervix     Allergic     Anxiety     Arthritis     Depression     Diabetes mellitus ([facility] [address] )     Fibromyalgia, primary     Hypertension     IBS (irritable bowel syndrome)     Migraine     Obesity     Vitamin B12 deficiency        Past Surgical History:   Procedure Laterality Date    COLONOSCOPY N/A 5/8/2018    Procedure: COLONOSCOPY;  Surgeon: Sanchez Simons MD;  Location: [facility]; Service: Gastroenterology    GA EXC SKIN BENIG >4 CM REMAINDR BODY N/A 7/1/2021    Procedure: EXCISION OF PILAR SCALP CYST X 2 AND RIGHT INNER GROIN NEVVUS;  Surgeon: Carlos Munson MD;  Location:  MAIN OR;  Service: Plastics    GA RECMPL WND SCALP,EXTR 2 6-7 5 CM N/A 7/1/2021    Procedure: CLOSURE WOUND SCALP X 2 AND RIGHT INNER GROIN;  Surgeon: Carlos Munson MD;  Location:  MAIN OR;  Service: Plastics    TONSILLECTOMY      WISDOM TOOTH EXTRACTION         Family History   Problem Relation Age of Onset    Rheum arthritis Mother     Psoriasis Mother     Other Mother     Hypertension Mother     Diabetes unspecified Mother     Sjogren's syndrome Mother     Alcohol abuse Mother     Drug abuse Mother     Anxiety disorder Father     Alcohol abuse Father     Lung cancer Maternal Grandfather     Cancer Other         bone    Diabetes Other     Other Other         High blood pressure    Depression Maternal Grandmother      I have reviewed and agree with the history as documented      E-Cigarette/Vaping    E-Cigarette Use Former User     Start Date 7/1/19     Comments 1 cartridge every 2 months       E-Cigarette/Vaping Substances    Nicotine No     THC Yes     CBD Yes     Flavoring No     Other No     Unknown No      Social History     Tobacco Use    Smoking status: Never Smoker    Smokeless tobacco: Never Used   Vaping Use    Vaping Use: Former    Start date: 7/1/2019    Substances: THC, CBD   Substance Use Topics    Alcohol use: Yes     Comment: rarely    Drug use: Yes     Frequency: 2 0 times per week     Types: Marijuana     Comment: medical marijuana (vape)       Review of Systems   Constitutional: Positive for fatigue  Negative for chills, diaphoresis and fever  HENT: Negative for congestion, sinus pressure, sore throat and trouble swallowing  Eyes: Negative for pain, discharge and itching  Respiratory: Negative for cough, chest tightness, shortness of breath and wheezing  Cardiovascular: Negative for chest pain, palpitations and leg swelling  Gastrointestinal: Negative for abdominal distention, abdominal pain, blood in stool, diarrhea, nausea and vomiting  Endocrine: Negative for polyphagia and polyuria  Genitourinary: Negative for difficulty urinating, dysuria, flank pain, hematuria, pelvic pain and vaginal bleeding  Musculoskeletal: Negative for arthralgias and back pain  Skin: Negative for rash  Neurological: Positive for speech difficulty and weakness (generalized)  Negative for dizziness, syncope, light-headedness, numbness and headaches  Physical Exam  Physical Exam  Vitals and nursing note reviewed  Constitutional:       General: She is not in acute distress  Appearance: She is well-developed  HENT:      Head: Normocephalic and atraumatic  Right Ear: External ear normal       Left Ear: External ear normal       Nose: Nose normal       Mouth/Throat:      Mouth: Mucous membranes are moist       Pharynx: No oropharyngeal exudate     Eyes: Conjunctiva/sclera: Conjunctivae normal       Pupils: Pupils are equal, round, and reactive to light  Cardiovascular:      Rate and Rhythm: Normal rate and regular rhythm  Heart sounds: Normal heart sounds  No murmur heard  No friction rub  No gallop  Pulmonary:      Effort: Pulmonary effort is normal  No respiratory distress  Breath sounds: Normal breath sounds  No wheezing or rales  Abdominal:      General: There is no distension  Palpations: Abdomen is soft  Tenderness: There is no abdominal tenderness  There is no guarding  Musculoskeletal:         General: No swelling, tenderness or deformity  Normal range of motion  Cervical back: Normal range of motion and neck supple  Lymphadenopathy:      Cervical: No cervical adenopathy  Skin:     General: Skin is warm and dry  Neurological:      General: No focal deficit present  Mental Status: She is alert and oriented to person, place, and time  Mental status is at baseline  Cranial Nerves: No cranial nerve deficit  Sensory: No sensory deficit  Motor: No weakness or abnormal muscle tone        Coordination: Coordination normal          Vital Signs  ED Triage Vitals [07/26/22 1814]   Temperature Pulse Respirations Blood Pressure SpO2   98 8 °F (37 1 °C) 91 18 133/75 97 %      Temp Source Heart Rate Source Patient Position - Orthostatic VS BP Location FiO2 (%)   Oral Monitor Sitting Right arm --      Pain Score       2           Vitals:    07/26/22 1814   BP: 133/75   Pulse: 91   Patient Position - Orthostatic VS: Sitting         Visual Acuity  Visual Acuity    Flowsheet Row Most Recent Value   L Pupil Size (mm) 4   R Pupil Size (mm) 4          ED Medications  Medications   sodium chloride 0 9 % bolus 1,000 mL (0 mL Intravenous Stopped 7/26/22 2053)       Diagnostic Studies  Results Reviewed     Procedure Component Value Units Date/Time    Urine Microscopic [[id number]]  (Normal) Collected: 07/26/22 1925    Lab Status: Final result Specimen: Urine, Clean Catch Updated: 07/26/22 1957     RBC, UA None Seen /hpf      WBC, UA 2-4 /hpf      Epithelial Cells Occasional /hpf      Bacteria, UA Occasional /hpf     COVID only [[id number]]  (Normal) Collected: 07/26/22 1852    Lab Status: Final result Specimen: Nares from Nose Updated: 07/26/22 1955     SARS-CoV-2 Negative    Narrative:      FOR PEDIATRIC PATIENTS - copy/paste COVID Guidelines URL to browser: [email or web address]  [email or web address]    SARS-CoV-2 assay is a Nucleic Acid Amplification assay intended for the  qualitative detection of nucleic acid from SARS-CoV-2 in nasopharyngeal  swabs  Results are for the presumptive identification of SARS-CoV-2 RNA  Positive results are indicative of infection with SARS-CoV-2, the virus  causing COVID-19, but do not rule out bacterial infection or co-infection  with other viruses  Laboratories within the [place] and its  territories are required to report all positive results to the appropriate  public health authorities  Negative results do not preclude SARS-CoV-2  infection and should not be used as the sole basis for treatment or other  patient management decisions  Negative results must be combined with  clinical observations, patient history, and epidemiological information  This test has not been FDA cleared or approved  This test has been authorized by FDA under an Emergency Use Authorization  (EUA)  This test is only authorized for the duration of time the  declaration that circumstances exist justifying the authorization of the  emergency use of an in vitro diagnostic tests for detection of SARS-CoV-2  virus and/or diagnosis of COVID-19 infection under section 564(b)(1) of  the Act, 21 U  [place] C  960FTA-2(Z)(5), unless the authorization is terminated  or revoked sooner  The test has been validated but independent review by FDA  and CLIA is pending      Test performed using [employer] GeneXpert: This RT-PCR assay targets N2,  a region unique to SARS-CoV-2  A conserved region in the E-gene was chosen  for pan-Sarbecovirus detection which includes SARS-CoV-2     UA (URINE) with reflex to Scope [[id number]]  (Abnormal) Collected: 07/26/22 1925    Lab Status: Final result Specimen: Urine, Clean Catch Updated: 07/26/22 1931     Color, UA Yellow     Clarity, UA Clear     Specific Gravity, UA 1 025     pH, UA 6 0     Leukocytes, UA 1+     Nitrite, UA Negative     Protein, UA Negative mg/dl      Glucose, UA Negative mg/dl      Ketones, UA Negative mg/dl      Urobilinogen, UA 0 2 E U /dl      Bilirubin, UA Negative     Occult Blood, UA Negative    Ethanol [[id number]]  (Normal) Collected: 07/26/22 1852    Lab Status: Final result Specimen: Blood from Arm, Left Updated: 07/26/22 1925     Ethanol Lvl <10 mg/dL     CBC and differential [[id number]]  (Abnormal) Collected: 07/26/22 1852    Lab Status: Final result Specimen: Blood from Arm, Left Updated: 07/26/22 1922     WBC 15 05 Thousand/uL      RBC 4 26 Million/uL      Hemoglobin 12 1 g/dL      Hematocrit 38 1 %      MCV 89 fL      MCH 28 4 pg      MCHC 31 8 g/dL      RDW 13 8 %      MPV 8 7 fL      Platelets 547 Thousands/uL     Narrative: This is an appended report  These results have been appended to a previously verified report      Manual Differential(PHLEBS Do Not Order) [[id number]]  (Abnormal) Collected: 07/26/22 1852    Lab Status: Final result Specimen: Blood from Arm, Left Updated: 07/26/22 1922     Segmented % 50 %      Lymphocytes % 45 %      Monocytes % 4 %      Eosinophils, % 1 %      Basophils % 0 %      Absolute Neutrophils 7 53 Thousand/uL      Lymphocytes Absolute 6 77 Thousand/uL      Monocytes Absolute 0 60 Thousand/uL      Eosinophils Absolute 0 15 Thousand/uL      Basophils Absolute 0 00 Thousand/uL      Total Counted --     RBC Morphology Normal     Platelet Estimate Increased    Comprehensive metabolic panel [[id number]]  (Abnormal) Collected: 07/26/22 1852    Lab Status: Final result Specimen: Blood from Arm, Left Updated: 07/26/22 1921     Sodium 137 mmol/L      Potassium 4 2 mmol/L      Chloride 101 mmol/L      CO2 26 mmol/L      ANION GAP 10 mmol/L      BUN 10 mg/dL      Creatinine 0 77 mg/dL      Glucose 81 mg/dL      Calcium 9 5 mg/dL      AST 11 U/L      ALT 11 U/L      Alkaline Phosphatase 63 U/L      Total Protein 6 9 g/dL      Albumin 3 8 g/dL      Total Bilirubin 0 35 mg/dL      eGFR 103 ml/min/1 73sq m     Narrative:      [facility] guidelines for Chronic Kidney Disease (CKD):     Stage 1 with normal or high GFR (GFR > 90 mL/min/1 73 square meters)    Stage 2 Mild CKD (GFR = 60-89 mL/min/1 73 square meters)    Stage 3A Moderate CKD (GFR = 45-59 mL/min/1 73 square meters)    Stage 3B Moderate CKD (GFR = 30-44 mL/min/1 73 square meters)    Stage 4 Severe CKD (GFR = 15-29 mL/min/1 73 square meters)    Stage 5 End Stage CKD (GFR <15 mL/min/1 73 square meters)  Note: GFR calculation is accurate only with a steady state creatinine    Salicylate level [[id number]]  (Normal) Collected: 07/26/22 1852    Lab Status: Final result Specimen: Blood from Arm, Left Updated: 96/28/81 0723     Salicylate Lvl <5 mg/dL     Acetaminophen level-If concentration is detectable, please discuss with medical  on call   [[id number]]  (Abnormal) Collected: 07/26/22 1852    Lab Status: Final result Specimen: Blood from Arm, Left Updated: 07/26/22 1921     Acetaminophen Level <10 ug/mL     Protime-INR [[id number]]  (Normal) Collected: 07/26/22 1852    Lab Status: Final result Specimen: Blood from Arm, Left Updated: 07/26/22 1917     Protime 12 9 seconds      INR 0 97    APTT [[id number]]  (Normal) Collected: 07/26/22 1852    Lab Status: Final result Specimen: Blood from Arm, Left Updated: 07/26/22 1917     PTT 25 seconds                  CT head without contrast   Final Result by Moe Ramirez MD (07/26 1924)      No acute intracranial abnormality  Workstation performed: [id number]         XR chest 1 view portable    (Results Pending)              Procedures  Procedures         ED Course  ED Course as of 07/26/22 2143 Tue Jul 26, 2022 1917 WBC(!): 15 05  Likely from Botox injections earlier today                                SBIRT 22yo+    Flowsheet Row Most Recent Value   SBIRT (25 yo +)    In order to provide better care to our patients, we are screening all of our patients for alcohol and drug use  Would it be okay to ask you these screening questions? Yes Filed at: 07/26/2022 1856   Initial Alcohol Screen: US AUDIT-C     1  How often do you have a drink containing alcohol? 1 Filed at: 07/26/2022 1856   2  How many drinks containing alcohol do you have on a typical day you are drinking? 2 Filed at: 07/26/2022 1856   3a  Male UNDER 65: How often do you have five or more drinks on one occasion? 0 Filed at: 07/26/2022 1856   3b  FEMALE Any Age, or MALE 65+: How often do you have 4 or more drinks on one occassion? 0 Filed at: 07/26/2022 1856   Audit-C Score 3 Filed at: 07/26/2022 1856   BRUCE: How many times in the past year have you    Used an illegal drug or used a prescription medication for non-medical reasons? Never Filed at: 07/26/2022 1856                    MDM  Number of Diagnoses or Management Options  Generalized weakness  Diagnosis management comments: 69-year-old female with history of migraines, chronic pain, from myalgias presenting for evaluation of generalized weakness  Symptoms since this morning  Admits to some nausea early on  Denies headache, visual changes  Mother feels like patient has some slurred speech that was short-lived after Botox injections this afternoon  Patient no focal neurologic deficits at this time  Vitals within normal limits  Will obtain blood work, CT head  White blood cell count elevated 15, likely secondary to Botox injections    No other signs of infection  CT head within normal limits  Told mom it is more that she follows up with her neurologist in regards to the symptoms  Disposition  Final diagnoses:   Generalized weakness     Time reflects when diagnosis was documented in both MDM as applicable and the Disposition within this note     Time User Action Codes Description Comment    7/26/2022  8:04 PM Singh Maxwell [R53 1] Generalized weakness       ED Disposition     ED Disposition   Discharge    Condition   Stable    Date/Time   Tue Jul 26, 2022  8:04 PM    Comment   Talon Lester discharge to home/self care  Follow-up Information     Follow up With Specialties Details Why Contact Info    your neurologist  Schedule an appointment as soon as possible for a visit  For follow up of symptoms           Discharge Medication List as of 7/26/2022  8:04 PM      CONTINUE these medications which have NOT CHANGED    Details   Aimovig 140 MG/ML SOAJ Inject 140mg under the skin every 30 (thirty) days  , Normal      ALPRAZolam (XANAX) 0 5 mg tablet Take 1 tablet (0 5 mg total) by mouth as needed in the morning and 1 tablet (0 5 mg total) as needed in the evening for anxiety  , Starting Wed 5/18/2022, Normal      ARIPiprazole (ABILIFY) 2 mg tablet Take 1 tablet (2 mg total) by mouth daily, Starting Tue 5/10/2022, Normal      !! B-D 3CC LUER-DEANN SYR 25GX1" 25G X 1" 3 ML MISC  , Starting Thu 7/26/2018, Historical Med      buPROPion (WELLBUTRIN XL) 300 mg 24 hr tablet Take 1 tablet (300 mg total) by mouth in the morning , Starting Wed 5/18/2022, Normal      dexamethasone (DECADRON) 2 mg tablet 1 tab qam with food prn migraine , Normal      dicyclomine (BENTYL) 20 mg tablet Take 1 tablet (20 mg total) by mouth 2 (two) times a day, Starting Fri 5/27/2022, Normal      diphenoxylate-atropine (LOMOTIL) 2 5-0 025 mg per tablet 1 tablet 4 times daily as needed for diarrhea, Normal      escitalopram (LEXAPRO) 20 mg tablet Take 1 tablet (20 mg total) by mouth in the morning , Starting Wed 5/18/2022, Normal      etonogestrel-ethinyl estradiol (NuvaRing) 0 12-0 015 MG/24HR vaginal ring Insert ring for 21 days then remove for one week , Normal      indomethacin (INDOCIN) 25 mg capsule 1 tab BID prn severe headache with food  Hold toradol , Normal      ketorolac (TORADOL) 10 mg tablet Take 1 tablet (10 mg total) by mouth every 6 (six) hours as needed (migraine) Max 2-3 per week , Starting Tue 3/22/2022, Normal      metFORMIN (GLUCOPHAGE-XR) 500 mg 24 hr tablet Take 2 tablets (1,000 mg total) by mouth daily with breakfast, Starting Tue 5/10/2022, Normal      metoclopramide (Reglan) 10 mg tablet Take 1 tablet (10 mg total) by mouth every 6 (six) hours, Starting Sat 5/21/2022, Normal      olmesartan (BENICAR) 20 mg tablet Take 1 tablet (20 mg total) by mouth in the morning , Starting Mon 5/23/2022, Normal      onabotulinumtoxin A (BOTOX) 100 units Inject as directed, Starting Tue 8/29/2017, Historical Med      ondansetron (ZOFRAN) 4 mg tablet Take 1 tablet (4 mg total) by mouth every 6 (six) hours, Starting Fri 5/27/2022, Normal      pantoprazole (PROTONIX) 40 mg tablet Take 1 tablet (40 mg total) by mouth daily, Starting Fri 5/27/2022, Normal      Rimegepant Sulfate (NURTEC) 75 MG TBDP 1 tab at migraine onset  No more than one dose per 24 hours  Hold triptan , Normal      !! Syringe/Needle, Disp, (SYRINGE 3CC/34EG2-8/4") 27G X 1-1/4" 3 ML MISC Use for IM injection of ketorolac , Normal       !! - Potential duplicate medications found  Please discuss with provider  No discharge procedures on file      PDMP Review       Value Time User    PDMP Reviewed  Yes 5/18/2022 10:34 AM Delfina Goldman MD          ED Provider  Electronically Signed by           Svetlana Contreras DO  07/26/22 1357

## 2022-07-26 NOTE — TELEPHONE ENCOUNTER
Jaspal Spear from [address] [place] [address] [place] on rx refill line to inform that [facility] requires PA through [employer]  ID: [id number]  BIN: [id number]  PCN: [id number]  Group: [id number]    Team - Please submit PA

## 2022-07-26 NOTE — PROGRESS NOTES
Universal Protocol   Consent: Verbal consent obtained  Written consent obtained    Risks and benefits: risks, benefits and alternatives were discussed  Consent given by: patient  Patient understanding: patient states understanding of the procedure being performed  Patient consent: the patient's understanding of the procedure matches consent given  Procedure consent: procedure consent matches procedure scheduled        Chemodenervation     Date/Time 7/26/2022 7:56 AM     Performed by  Ashlie Sellers PA-C     Authorized by Ashlie Sellers PA-C        Pre-procedure details      Prepped With: Alcohol     Procedure details     Position:  Upright   Botox     Botox Type:  Type A    Brand:  Botox    mL's of Botulinum Toxin:  190    Final Concentration per CC:  100 units    Needle Gauge:  30 G 2 5 inch   Procedures     Botox Procedures: chronic headache      Indications: migraines     Injection Location      Head / Face:  L superior trapezius, R superior trapezius, L superior cervical paraspinal, R superior cervical paraspinal, L , R , procerus, L temporalis, R temporalis, R frontalis, L frontalis, R medial occipitalis and L medial occipitalis    L  injection amount:  5 unit(s)    R  injection amount:  5 unit(s)    L lateral frontalis:  5 unit(s)    R lateral frontalis:  5 unit(s)    L medial frontalis:  5 unit(s)    R medial frontalis:  5 unit(s)    L temporalis injection amount:  20 unit(s)    R temporalis injection amount:  20 unit(s)    Procerus injection amount:  5 unit(s)    L medial occipitalis injection amount:  15 unit(s)    R medial occipitalis injection amount:  15 unit(s)    L superior cervical paraspinal injection amount:  10 unit(s)    R superior cervical paraspinal injection amount:  10 unit(s)    L superior trapezius injection amount:  0 unit(s)    R superior trapezius injection amount:  0 unit(s)   Total Units     Total units used:  190    Total units discarded:  10 Post-procedure details      Chemodenervation:  Chronic migraine    Facial Nerve Location[de-identified]  Bilateral facial nerve    Patient tolerance of procedure: Tolerated well, no immediate complications   Comments      Extra units medically necessary:  - 30 units between the R and L headband region/ scalp  - 20 units between each anterior temporalis  - 15 units in the occipitalis muscles b/l  Avoided traps b/l  Blood pressure 133/82, pulse 95, temperature 98 °F (36 7 °C), temperature source Oral, not currently breastfeeding  Scheduled appt with the pt to discuss mild hand tremor b/l, TSH ordered  She thinks possibly related to new medication for n/d by [facility]- bentyl? Recommended decadron to break current HA cycle and nurtec- new for her, s/e reviewed

## 2022-07-27 ENCOUNTER — TELEPHONE (OUTPATIENT)
Dept: NEUROLOGY | Facility: CLINIC | Age: 32
End: 2022-07-27

## 2022-07-27 DIAGNOSIS — G43.709 CHRONIC MIGRAINE WITHOUT AURA WITHOUT STATUS MIGRAINOSUS, NOT INTRACTABLE: ICD-10-CM

## 2022-07-27 NOTE — TELEPHONE ENCOUNTER
Please try decadron  Please give a call back if ineffective  Can we work on [employer]	Dale KHALIL? I can send to [facility] in the meantime  Thanks

## 2022-07-27 NOTE — TELEPHONE ENCOUNTER
Pt c/o feeling drunk, nausea and head pain, rating 2/10 and disoriented  She had slurred speech and was spaced out  Cannot concentrate  Her symptoms started 4 days ago  She went to  ed on 7/24/22 and 7/26/22  Pls see ed notes, head CT and bw results  She feels that she has a migraine but she never had this kind of feeling before  Her symptoms slightly better today  Location/Description: throbbing-left front    Pain scale: 5    Associated symptoms:nausea and sensitivity to light and sound    Precipitating factors: possibly botox  She had botox yesterday  Autoimmune flare  She just changed her Nuvaring  Current migraine medications are confirmed as:  botox every 3 months  She did not start Nurtec yet d/t ins  She did not  her decadron yet    Medications tried in the past? She tried depakote in the past but she had side effects  Not tried olanzapine       [phone], ok to leave detailed message

## 2022-07-28 NOTE — TELEPHONE ENCOUNTER
[facility]  She is not qualified to get Nurtec for free at [facility]  Chart reviewed; pt tried and failed sumatriptan  PA initiated on CMM  (Key: [id number])  Awaiting determination    Pt made aware of the below   She verbalized understanding

## 2022-07-29 ENCOUNTER — APPOINTMENT (OUTPATIENT)
Dept: RADIOLOGY | Facility: CLINIC | Age: 32
End: 2022-07-29
Payer: COMMERCIAL

## 2022-07-29 ENCOUNTER — NURSE TRIAGE (OUTPATIENT)
Dept: OTHER | Facility: OTHER | Age: 32
End: 2022-07-29

## 2022-07-29 ENCOUNTER — TELEPHONE (OUTPATIENT)
Dept: OTHER | Facility: OTHER | Age: 32
End: 2022-07-29

## 2022-07-29 ENCOUNTER — TELEMEDICINE (OUTPATIENT)
Dept: BEHAVIORAL/MENTAL HEALTH CLINIC | Facility: CLINIC | Age: 32
End: 2022-07-29
Payer: COMMERCIAL

## 2022-07-29 DIAGNOSIS — K59.00 CONSTIPATION, UNSPECIFIED CONSTIPATION TYPE: ICD-10-CM

## 2022-07-29 DIAGNOSIS — K59.00 CONSTIPATION, UNSPECIFIED CONSTIPATION TYPE: Primary | ICD-10-CM

## 2022-07-29 DIAGNOSIS — F43.12 CHRONIC POST-TRAUMATIC STRESS DISORDER (PTSD): Primary | ICD-10-CM

## 2022-07-29 DIAGNOSIS — F41.1 GENERALIZED ANXIETY DISORDER: Chronic | ICD-10-CM

## 2022-07-29 DIAGNOSIS — F33.1 DEPRESSION, MAJOR, RECURRENT, MODERATE (HCC): Primary | Chronic | ICD-10-CM

## 2022-07-29 PROCEDURE — 74018 RADEX ABDOMEN 1 VIEW: CPT

## 2022-07-29 PROCEDURE — 90834 PSYTX W PT 45 MINUTES: CPT | Performed by: SOCIAL WORKER

## 2022-07-29 RX ORDER — INDOMETHACIN 25 MG/1
CAPSULE ORAL
Qty: 30 CAPSULE | Refills: 0 | Status: SHIPPED | OUTPATIENT
Start: 2022-07-29

## 2022-07-29 RX ORDER — PRAZOSIN HYDROCHLORIDE 1 MG/1
1 CAPSULE ORAL
Qty: 30 CAPSULE | Refills: 0 | Status: SHIPPED | OUTPATIENT
Start: 2022-07-29 | End: 2022-09-06 | Stop reason: SDUPTHER

## 2022-07-29 NOTE — PSYCH
Virtual Regular Visit    Verification of patient location:    Patient is located in the following state in which I hold an active license [place]    Assessment/Plan:    Problem List Items Addressed This Visit        Other    Generalized anxiety disorder (Chronic)    Depression, major, recurrent, moderate (HCC) - Primary (Chronic)        Goals addressed in session: Goal 1      Reason for visit is No chief complaint on file  Encounter provider APARNA Royal    Provider located at [address] [place] [id number] [phone]    Recent Visits  No visits were found meeting these conditions  Showing recent visits within past 7 days and meeting all other requirements  Future Appointments  No visits were found meeting these conditions  Showing future appointments within next 150 days and meeting all other requirements     The patient was identified by name and date of birth  Sage Mom was informed that this is a telemedicine visit and that the visit is being conducted throughEpic Embedded and patient was informed this is a secure, HIPAA-complaint platform  She agrees to proceed     My office door was closed  No one else was in the room  She acknowledged consent and understanding of privacy and security of the video platform  The patient has agreed to participate and understands they can discontinue the visit at any time  Patient is aware this is a billable service  Leela Davies is a 33 y o  female  DATA: Met with Stephanie for scheduled individual session  This session was attended by Jonatan Amor orientation  Stephanie provided verbal consent for the new employee to sit in on this and future sessions  She acknowledges understanding that she can opt out of student observations/participation at any time   Topics of discussion included family stressors, relationships with family, trauma history, physical health concerns, relationships with friends and mood regulation and symptoms  "It's been the worst two weeks " Stephanie discussed her health-related issues-- including her rheumatological symptoms  She states that she has been "having a flare" for the past couple weeks  She states that recently she has been experiencing some very disturbing nightmares  She asked if it might be possible to start on Prazosin  This clinician sent an inbox message to Dr Sofia Mcdonnell while we were in the session  We spent some time talking about her physical health issues and strategies to increase her activity in order to decrease her level of pain  Vera Tinoco acknowledges the connection between her mental health symptoms and stressors and the physical health symptoms that she experiences  She states that she has been spending some time with her friend Ana Brambila, and she has also met a "new friend" who lives very near to her  Vera Tinoco states that her mother and grandmother are doing well overall, and she continues to feel supported by her mother  Client shows evidence of utilizing Mindfulness-based strategies skills to manage mental health symptoms  During this session, this clinician used the following therapeutic modalities: supportive psychotherapy, client-centered therapy, mindfulness-based strategies, DBT-informed skills, Motivational Interviewing and solution-focused therapy  ASSESSMENT: Vera Tinoco presents with a somewhat dysthymic mood  Her affect is normal range and intensity, appropriate  Vera Tinoco exhibits good therapeutic rapport with this clinician  Vera Tinoco continues to exhibit willingness to work on treatment goals and objectives  Vera Tinoco presents with a minimal risk of suicide, minimal risk of self-harm, and minimal risk of harm to others  PLAN: Vera Tinoco will return in approximately 2-3 weeks for the next scheduled session   Between sessions, Vera Tinoco will increase her physical activity and use of mindfulness-based strategies to improve her mood  She will report back during the next session re: successes and barriers  This clinician will follow up with Stephanie's psychiatric provider re: medication adjustments and will coordinate as needed  At the next session, this clinician will use supportive psychotherapy, client-centered therapy, mindfulness-based strategies, DBT-informed skills, Motivational Interviewing and solution-focused therapy to address her mood regulation and relationship concerns, in an effort to assist Alvin Marquis with meeting treatment goals  HPI     Past Medical History:   Diagnosis Date    Abnormal Pap smear of cervix     Allergic     Anxiety     Arthritis     Depression     Diabetes mellitus (HCC)     Fibromyalgia, primary     Hypertension     IBS (irritable bowel syndrome)     Migraine     Obesity     Vitamin B12 deficiency        Past Surgical History:   Procedure Laterality Date    COLONOSCOPY N/A 5/8/2018    Procedure: COLONOSCOPY;  Surgeon: Dylan Desai MD;  Location: [facility] GI LAB; Service: Gastroenterology    MN EXC SKIN BENIG >4 CM REMAINDR BODY N/A 7/1/2021    Procedure: EXCISION OF PILAR SCALP CYST X 2 AND RIGHT INNER GROIN NEVVUS;  Surgeon: Mike Boykin MD;  Location: [address] [place] [address] OR;  Service: Plastics    MN RECMPL WND SCALP,EXTR 2 6-7 5 CM N/A 7/1/2021    Procedure: CLOSURE WOUND SCALP X 2 AND RIGHT INNER GROIN;  Surgeon: Mike Boykin MD;  Location: [address] [place] [address] OR;  Service: Plastics    TONSILLECTOMY      WISDOM TOOTH EXTRACTION         Current Outpatient Medications   Medication Sig Dispense Refill    Aimovig 140 MG/ML SOAJ Inject 140mg under the skin every 30 (thirty) days  1 mL 10    ALPRAZolam (XANAX) 0 5 mg tablet Take 1 tablet (0 5 mg total) by mouth as needed in the morning and 1 tablet (0 5 mg total) as needed in the evening for anxiety   60 tablet 2    ARIPiprazole (ABILIFY) 2 mg tablet Take 1 tablet (2 mg total) by mouth daily 30 tablet 2    B-D 3CC LUER-DEANN SYR 25GX1" 25G X 1" 3 ML MISC        buPROPion (WELLBUTRIN XL) 300 mg 24 hr tablet Take 1 tablet (300 mg total) by mouth in the morning  30 tablet 2    dexamethasone (DECADRON) 2 mg tablet 1 tab qam with food prn migraine  (Patient not taking: Reported on 7/26/2022) 5 tablet 0    dicyclomine (BENTYL) 20 mg tablet Take 1 tablet (20 mg total) by mouth 2 (two) times a day 60 tablet 1    diphenoxylate-atropine (LOMOTIL) 2 5-0 025 mg per tablet 1 tablet 4 times daily as needed for diarrhea 30 tablet 1    escitalopram (LEXAPRO) 20 mg tablet Take 1 tablet (20 mg total) by mouth in the morning  30 tablet 2    etonogestrel-ethinyl estradiol (NuvaRing) 0 12-0 015 MG/24HR vaginal ring Insert ring for 21 days then remove for one week  3 each 0    indomethacin (INDOCIN) 25 mg capsule 1 tab BID prn severe headache with food  Hold toradol  30 capsule 0    ketorolac (TORADOL) 10 mg tablet Take 1 tablet (10 mg total) by mouth every 6 (six) hours as needed (migraine) Max 2-3 per week  10 tablet 0    metFORMIN (GLUCOPHAGE-XR) 500 mg 24 hr tablet Take 2 tablets (1,000 mg total) by mouth daily with breakfast (Patient not taking: No sig reported) 60 tablet 2    metoclopramide (Reglan) 10 mg tablet Take 1 tablet (10 mg total) by mouth every 6 (six) hours (Patient not taking: Reported on 7/26/2022) 30 tablet 0    olmesartan (BENICAR) 20 mg tablet Take 1 tablet (20 mg total) by mouth in the morning  30 tablet 2    onabotulinumtoxin A (BOTOX) 100 units Inject as directed      ondansetron (ZOFRAN) 4 mg tablet Take 1 tablet (4 mg total) by mouth every 6 (six) hours 30 tablet 2    pantoprazole (PROTONIX) 40 mg tablet Take 1 tablet (40 mg total) by mouth daily 30 tablet 3    Rimegepant Sulfate (NURTEC) 75 MG TBDP 1 tab at migraine onset  No more than one dose per 24 hours  Hold triptan   8 tablet 2    Syringe/Needle, Disp, (SYRINGE 3CC/16ZC5-1/4") 27G X 1-1/4" 3 ML MISC Use for IM injection of ketorolac  4 each 0     Current Facility-Administered Medications   Medication Dose Route Frequency Provider Last Rate Last Admin    cyanocobalamin injection 1,000 mcg  1,000 mcg Intramuscular Q30 Days Ardeth Jose Angel, DO   1,000 mcg at 08/03/20 4661    cyanocobalamin injection 1,000 mcg  1,000 mcg Intramuscular Q14 Days Ardeth Jose Angel, DO   1,000 mcg at 05/18/20 1146    cyanocobalamin injection 1,000 mcg  1,000 mcg Intramuscular Q30 Days Ardeth Jose Angel, DO   1,000 mcg at 09/01/20 1129        Allergies   Allergen Reactions    Cimzia [Certolizumab Pegol] Swelling    Desvenlafaxine      Other reaction(s): state of confusion    Ixekizumab Vomiting    Valproic Acid Other (See Comments)     Other reaction(s): dilated pupils, "schizi"    Tizanidine Anxiety       Review of Systems    Video Exam    There were no vitals filed for this visit  Physical Exam     I spent 53 minutes directly with the patient during this visit     Session start time: 8:04am  Session end time: 8:57am    [address] [place] [address] Drive verbally agrees to participate in [employer]  Pt is aware that [employer] could be limited without vital signs or the ability to perform a full hands-on physical exam  Cruz Forbesdebbie Reese understands she or the provider may request at any time to terminate the video visit and request the patient to seek care or treatment in person

## 2022-07-29 NOTE — TELEPHONE ENCOUNTER
Spoke with pt who stated she is not really passing gas and only remembers passing gas maybe twice this week  Advised pt to have KUB  Order placed  I explained if no obstruction-she is to proceed with bowel prep miralax/dulcolax   Pt verbalized understanding

## 2022-07-29 NOTE — TELEPHONE ENCOUNTER
Regarding: constipation  ----- Message from Shanel Richards RN sent at 7/29/2022  9:14 AM EDT -----  Constipation for over 1 week  Tried OTC and no relief

## 2022-07-29 NOTE — TELEPHONE ENCOUNTER
If patient is passing gas would recommend starting a bowel prep  If not, would recommend KUB followed by bowel prep  Can utilize miralax/dulcolax

## 2022-07-31 ENCOUNTER — HOSPITAL ENCOUNTER (EMERGENCY)
Facility: HOSPITAL | Age: 32
Discharge: HOME | End: 2022-07-31
Attending: EMERGENCY MEDICINE
Payer: COMMERCIAL

## 2022-07-31 VITALS
RESPIRATION RATE: 16 BRPM | HEART RATE: 96 BPM | TEMPERATURE: 98 F | DIASTOLIC BLOOD PRESSURE: 90 MMHG | OXYGEN SATURATION: 99 % | SYSTOLIC BLOOD PRESSURE: 159 MMHG

## 2022-07-31 DIAGNOSIS — G43.909 MIGRAINE: Primary | ICD-10-CM

## 2022-07-31 PROCEDURE — 99283 EMERGENCY DEPT VISIT LOW MDM: CPT

## 2022-07-31 PROCEDURE — 96375 TX/PRO/DX INJ NEW DRUG ADDON: CPT

## 2022-07-31 PROCEDURE — 99284 EMERGENCY DEPT VISIT MOD MDM: CPT | Performed by: EMERGENCY MEDICINE

## 2022-07-31 PROCEDURE — 96365 THER/PROPH/DIAG IV INF INIT: CPT

## 2022-07-31 RX ORDER — MAGNESIUM SULFATE HEPTAHYDRATE 40 MG/ML
2 INJECTION, SOLUTION INTRAVENOUS ONCE
Status: COMPLETED | OUTPATIENT
Start: 2022-07-31 | End: 2022-07-31

## 2022-07-31 RX ORDER — DIPHENHYDRAMINE HYDROCHLORIDE 50 MG/ML
25 INJECTION INTRAMUSCULAR; INTRAVENOUS ONCE
Status: COMPLETED | OUTPATIENT
Start: 2022-07-31 | End: 2022-07-31

## 2022-07-31 RX ORDER — KETOROLAC TROMETHAMINE 30 MG/ML
30 INJECTION, SOLUTION INTRAMUSCULAR; INTRAVENOUS ONCE
Status: COMPLETED | OUTPATIENT
Start: 2022-07-31 | End: 2022-07-31

## 2022-07-31 RX ORDER — METOCLOPRAMIDE HYDROCHLORIDE 5 MG/ML
10 INJECTION INTRAMUSCULAR; INTRAVENOUS ONCE
Status: COMPLETED | OUTPATIENT
Start: 2022-07-31 | End: 2022-07-31

## 2022-07-31 RX ADMIN — DIPHENHYDRAMINE HYDROCHLORIDE 25 MG: 50 INJECTION, SOLUTION INTRAMUSCULAR; INTRAVENOUS at 16:53

## 2022-07-31 RX ADMIN — MAGNESIUM SULFATE HEPTAHYDRATE 2 G: 40 INJECTION, SOLUTION INTRAVENOUS at 17:44

## 2022-07-31 RX ADMIN — METOCLOPRAMIDE HYDROCHLORIDE 10 MG: 5 INJECTION INTRAMUSCULAR; INTRAVENOUS at 16:53

## 2022-07-31 RX ADMIN — KETOROLAC TROMETHAMINE 30 MG: 30 INJECTION, SOLUTION INTRAMUSCULAR at 16:53

## 2022-07-31 NOTE — ED PROVIDER NOTES
History  Chief Complaint   Patient presents with    Migraine     78-year-old female with history of migraine cephalgia presents emergency room complaining of headache which started yesterday  Patient notes that she has had an increase in the amount of headache she has had as of recent, and notes that with this headache she feels more spaced out than normal   The patient states she speaks slower but all other aspects of her headaches are typical for her chronic migraine cephalgia  Patient denies any trauma, or any rash, fever, or neck pain  The patient does not get auras normally  Patient notes that when her headache gets bad she comes the ER and she gets a migraine cocktail which helps  Patient is currently on prednisone as prescribed by her neurologist for recent increase in headaches  Prior to Admission Medications   Prescriptions Last Dose Informant Patient Reported? Taking? ALPRAZolam (XANAX) 0 5 mg tablet  Self No No   Sig: Take 1 tablet (0 5 mg total) by mouth as needed in the morning and 1 tablet (0 5 mg total) as needed in the evening for anxiety  ARIPiprazole (ABILIFY) 2 mg tablet  Self No No   Sig: Take 1 tablet (2 mg total) by mouth daily   Aimovig 140 MG/ML SOAJ  Self No No   Sig: Inject 140mg under the skin every 30 (thirty) days  B-D 3CC LUER-DEANN SYR 25GX1" 25G X 1" 3 ML MISC  Self Yes No   Sig:     Rimegepant Sulfate (NURTEC) 75 MG TBDP   No No   Si tab at migraine onset  No more than one dose per 24 hours  Hold triptan  Syringe/Needle, Disp, (SYRINGE 3CC/59RK7-3/4") 27G X 1-1/4" 3 ML MISC  Self No No   Sig: Use for IM injection of ketorolac  buPROPion (WELLBUTRIN XL) 300 mg 24 hr tablet  Self No No   Sig: Take 1 tablet (300 mg total) by mouth in the morning  dexamethasone (DECADRON) 2 mg tablet   No No   Si tab qam with food prn migraine     Patient not taking: Reported on 2022   dicyclomine (BENTYL) 20 mg tablet   No No   Sig: Take 1 tablet (20 mg total) by mouth 2 (two) times a day   diphenoxylate-atropine (LOMOTIL) 2 5-0 025 mg per tablet  Self No No   Si tablet 4 times daily as needed for diarrhea   escitalopram (LEXAPRO) 20 mg tablet  Self No No   Sig: Take 1 tablet (20 mg total) by mouth in the morning    etonogestrel-ethinyl estradiol (NuvaRing) 0 12-0 015 MG/24HR vaginal ring   No No   Sig: Insert ring for 21 days then remove for one week  indomethacin (INDOCIN) 25 mg capsule   No No   Si tab BID prn severe headache with food  Hold toradol    ketorolac (TORADOL) 10 mg tablet  Self No No   Sig: Take 1 tablet (10 mg total) by mouth every 6 (six) hours as needed (migraine) Max 2-3 per week  metFORMIN (GLUCOPHAGE-XR) 500 mg 24 hr tablet  Self No No   Sig: Take 2 tablets (1,000 mg total) by mouth daily with breakfast   Patient not taking: No sig reported   metoclopramide (Reglan) 10 mg tablet  Self No No   Sig: Take 1 tablet (10 mg total) by mouth every 6 (six) hours   Patient not taking: Reported on 2022   olmesartan (BENICAR) 20 mg tablet  Self No No   Sig: Take 1 tablet (20 mg total) by mouth in the morning     onabotulinumtoxin A (BOTOX) 100 units  Self Yes No   Sig: Inject as directed   ondansetron (ZOFRAN) 4 mg tablet   No No   Sig: Take 1 tablet (4 mg total) by mouth every 6 (six) hours   pantoprazole (PROTONIX) 40 mg tablet   No No   Sig: Take 1 tablet (40 mg total) by mouth daily   prazosin (MINIPRESS) 1 mg capsule   No No   Sig: Take 1 capsule (1 mg total) by mouth daily at bedtime      Facility-Administered Medications Last Administration Doses Remaining   cyanocobalamin injection 1,000 mcg 8/3/2020  8:59 AM    cyanocobalamin injection 1,000 mcg 2020 11:46 AM    cyanocobalamin injection 1,000 mcg 2020 11:29 AM           Past Medical History:   Diagnosis Date    Abnormal Pap smear of cervix     Allergic     Anxiety     Arthritis     Depression     Diabetes mellitus (HCC)     Fibromyalgia, primary     Hypertension     IBS (irritable bowel syndrome)     Migraine     Obesity     Vitamin B12 deficiency        Past Surgical History:   Procedure Laterality Date    COLONOSCOPY N/A 5/8/2018    Procedure: COLONOSCOPY;  Surgeon: Malika Cosme MD;  Location: [facility] GI LAB; Service: Gastroenterology    ME EXC SKIN BENIG >4 CM REMAINDR BODY N/A 7/1/2021    Procedure: EXCISION OF PILAR SCALP CYST X 2 AND RIGHT INNER GROIN NEVVUS;  Surgeon: Sirisha Mauricio MD;  Location:  MAIN OR;  Service: Plastics    ME RECMPL WND SCALP,EXTR 2 6-7 5 CM N/A 7/1/2021    Procedure: CLOSURE WOUND SCALP X 2 AND RIGHT INNER GROIN;  Surgeon: Sirisha Mauricio MD;  Location:  MAIN OR;  Service: Plastics    TONSILLECTOMY      WISDOM TOOTH EXTRACTION         Family History   Problem Relation Age of Onset    Rheum arthritis Mother     Psoriasis Mother     Other Mother     Hypertension Mother     Diabetes unspecified Mother     Sjogren's syndrome Mother     Alcohol abuse Mother     Drug abuse Mother     Anxiety disorder Father     Alcohol abuse Father     Lung cancer Maternal Grandfather     Cancer Other         bone    Diabetes Other     Other Other         High blood pressure    Depression Maternal Grandmother      I have reviewed and agree with the history as documented  E-Cigarette/Vaping    E-Cigarette Use Former User     Start Date 7/1/19     Comments 1 cartridge every 2 months       E-Cigarette/Vaping Substances    Nicotine No     THC Yes     CBD Yes     Flavoring No     Other No     Unknown No      Social History     Tobacco Use    Smoking status: Never Smoker    Smokeless tobacco: Never Used   Vaping Use    Vaping Use: Former    Start date: 7/1/2019    Substances: THC, CBD   Substance Use Topics    Alcohol use: Yes     Comment: rarely    Drug use: Yes     Frequency: 2 0 times per week     Types: Marijuana     Comment: medical marijuana (vape)       Review of Systems   Constitutional: Negative for chills and fever  HENT: Negative for ear pain and sore throat  Eyes: Negative for pain and visual disturbance  Respiratory: Negative for cough and shortness of breath  Cardiovascular: Negative for chest pain and palpitations  Gastrointestinal: Negative for abdominal pain and vomiting  Genitourinary: Negative for dysuria and hematuria  Musculoskeletal: Negative for arthralgias and back pain  Skin: Negative for color change and rash  Neurological: Positive for headaches  Negative for tremors, seizures and syncope  All other systems reviewed and are negative  Physical Exam  Physical Exam  Vitals and nursing note reviewed  Constitutional:       General: She is not in acute distress  Appearance: Normal appearance  She is well-developed  HENT:      Head: Normocephalic and atraumatic  Right Ear: External ear normal       Left Ear: External ear normal       Nose: Nose normal       Mouth/Throat:      Mouth: Mucous membranes are moist    Eyes:      Conjunctiva/sclera: Conjunctivae normal    Cardiovascular:      Rate and Rhythm: Normal rate and regular rhythm  Pulses: Normal pulses  Heart sounds: Normal heart sounds  No murmur heard  Pulmonary:      Effort: Pulmonary effort is normal  No respiratory distress  Breath sounds: Normal breath sounds  Abdominal:      General: Bowel sounds are normal       Palpations: Abdomen is soft  Tenderness: There is no abdominal tenderness  Musculoskeletal:         General: No swelling or deformity  Normal range of motion  Cervical back: Neck supple  No rigidity or tenderness  Skin:     General: Skin is warm and dry  Capillary Refill: Capillary refill takes less than 2 seconds  Neurological:      General: No focal deficit present  Mental Status: She is alert and oriented to person, place, and time  Mental status is at baseline  Cranial Nerves: No cranial nerve deficit  Sensory: No sensory deficit        Motor: No weakness  Coordination: Coordination normal       Gait: Gait normal    Psychiatric:         Mood and Affect: Mood normal          Vital Signs  ED Triage Vitals   Temperature Pulse Respirations Blood Pressure SpO2   07/31/22 1556 07/31/22 1556 07/31/22 1556 07/31/22 1556 07/31/22 1556   98 °F (36 7 °C) 96 16 159/90 99 %      Temp Source Heart Rate Source Patient Position - Orthostatic VS BP Location FiO2 (%)   07/31/22 1556 07/31/22 1556 07/31/22 1556 07/31/22 1556 --   Tympanic Monitor Sitting Right arm       Pain Score       07/31/22 1653       6           Vitals:    07/31/22 1556   BP: 159/90   Pulse: 96   Patient Position - Orthostatic VS: Sitting         Visual Acuity      ED Medications  Medications   diphenhydrAMINE (BENADRYL) injection 25 mg (25 mg Intravenous Given 7/31/22 1653)   metoclopramide (REGLAN) injection 10 mg (10 mg Intravenous Given 7/31/22 1653)   ketorolac (TORADOL) injection 30 mg (30 mg Intravenous Given 7/31/22 1653)   magnesium sulfate 2 g/50 mL IVPB (premix) 2 g (0 g Intravenous Stopped 7/31/22 1844)       Diagnostic Studies  Results Reviewed     None                 No orders to display              Procedures  Procedures         ED Course  ED Course as of 07/31/22 2146   Hoang Samantha Jul 31, 2022   1822 On re-evaluation, patient notes she is much better  MDM    Disposition  Final diagnoses:   Migraine     Time reflects when diagnosis was documented in both MDM as applicable and the Disposition within this note     Time User Action Codes Description Comment    7/31/2022  7:00 PM Marco Zafar Add [V20 301] Migraine       ED Disposition     ED Disposition   Discharge    Condition   Stable    Date/Time   Amy Jul 31, 2022  7:00 PM    Comment   Cody Ziegler discharge to home/self care                 Follow-up Information    None         Discharge Medication List as of 7/31/2022  7:00 PM      CONTINUE these medications which have NOT CHANGED Details   Aimovig 140 MG/ML SOAJ Inject 140mg under the skin every 30 (thirty) days  , Normal      ALPRAZolam (XANAX) 0 5 mg tablet Take 1 tablet (0 5 mg total) by mouth as needed in the morning and 1 tablet (0 5 mg total) as needed in the evening for anxiety  , Starting Wed 5/18/2022, Normal      ARIPiprazole (ABILIFY) 2 mg tablet Take 1 tablet (2 mg total) by mouth daily, Starting Tue 5/10/2022, Normal      !! B-D 3CC LUER-DEANN SYR 25GX1" 25G X 1" 3 ML MISC  , Starting Thu 7/26/2018, Historical Med      buPROPion (WELLBUTRIN XL) 300 mg 24 hr tablet Take 1 tablet (300 mg total) by mouth in the morning , Starting Wed 5/18/2022, Normal      dexamethasone (DECADRON) 2 mg tablet 1 tab qam with food prn migraine , Normal      dicyclomine (BENTYL) 20 mg tablet Take 1 tablet (20 mg total) by mouth 2 (two) times a day, Starting Fri 5/27/2022, Normal      diphenoxylate-atropine (LOMOTIL) 2 5-0 025 mg per tablet 1 tablet 4 times daily as needed for diarrhea, Normal      escitalopram (LEXAPRO) 20 mg tablet Take 1 tablet (20 mg total) by mouth in the morning , Starting Wed 5/18/2022, Normal      etonogestrel-ethinyl estradiol (NuvaRing) 0 12-0 015 MG/24HR vaginal ring Insert ring for 21 days then remove for one week , Normal      indomethacin (INDOCIN) 25 mg capsule 1 tab BID prn severe headache with food   Hold toradol , Normal      ketorolac (TORADOL) 10 mg tablet Take 1 tablet (10 mg total) by mouth every 6 (six) hours as needed (migraine) Max 2-3 per week , Starting Tue 3/22/2022, Normal      metFORMIN (GLUCOPHAGE-XR) 500 mg 24 hr tablet Take 2 tablets (1,000 mg total) by mouth daily with breakfast, Starting Tue 5/10/2022, Normal      metoclopramide (Reglan) 10 mg tablet Take 1 tablet (10 mg total) by mouth every 6 (six) hours, Starting Sat 5/21/2022, Normal      olmesartan (BENICAR) 20 mg tablet Take 1 tablet (20 mg total) by mouth in the morning , Starting Mon 5/23/2022, Normal      onabotulinumtoxin A (BOTOX) 100 units Inject as directed, Starting Tue 8/29/2017, Historical Med      ondansetron (ZOFRAN) 4 mg tablet Take 1 tablet (4 mg total) by mouth every 6 (six) hours, Starting Fri 5/27/2022, Normal      pantoprazole (PROTONIX) 40 mg tablet Take 1 tablet (40 mg total) by mouth daily, Starting Fri 5/27/2022, Normal      prazosin (MINIPRESS) 1 mg capsule Take 1 capsule (1 mg total) by mouth daily at bedtime, Starting Fri 7/29/2022, Normal      Rimegepant Sulfate (NURTEC) 75 MG TBDP 1 tab at migraine onset  No more than one dose per 24 hours  Hold triptan , Normal      !! Syringe/Needle, Disp, (SYRINGE 3CC/88QA5-3/4") 27G X 1-1/4" 3 ML MISC Use for IM injection of ketorolac , Normal       !! - Potential duplicate medications found  Please discuss with provider  No discharge procedures on file      PDMP Review       Value Time User    PDMP Reviewed  Yes 5/18/2022 10:34 AM Tasha Godinez MD          ED Provider  Electronically Signed by           Jeferson Ortega DO  07/31/22 7193

## 2022-07-31 NOTE — DISCHARGE INSTRUCTIONS
Return to the ER with any new, concerning, worsening issues  Continue current medications  Follow-up with your neurologist within a week for repeat evaluation 
Class II - visualization of the soft palate, fauces, and uvula

## 2022-08-02 RX ORDER — RIZATRIPTAN BENZOATE 10 MG/1
TABLET, ORALLY DISINTEGRATING ORAL
Qty: 9 TABLET | Refills: 2 | Status: SHIPPED | OUTPATIENT
Start: 2022-08-02 | End: 2022-08-10 | Stop reason: ALTCHOICE

## 2022-08-02 NOTE — TELEPHONE ENCOUNTER
nurtec denied, must have tried and failed two triptans, i e  rizatriptan    Failed sumatriptan    Please provide recommendation, thank you      F/u scheduled 8/3 with mary crowder pa-[facility]

## 2022-08-03 ENCOUNTER — OFFICE VISIT (OUTPATIENT)
Dept: NEUROLOGY | Facility: CLINIC | Age: 32
End: 2022-08-03
Payer: COMMERCIAL

## 2022-08-03 ENCOUNTER — OFFICE VISIT (OUTPATIENT)
Dept: FAMILY MEDICINE CLINIC | Facility: CLINIC | Age: 32
End: 2022-08-03
Payer: COMMERCIAL

## 2022-08-03 VITALS
HEART RATE: 93 BPM | WEIGHT: 241.8 LBS | DIASTOLIC BLOOD PRESSURE: 85 MMHG | BODY MASS INDEX: 42.83 KG/M2 | SYSTOLIC BLOOD PRESSURE: 125 MMHG

## 2022-08-03 VITALS
HEIGHT: 63 IN | SYSTOLIC BLOOD PRESSURE: 138 MMHG | WEIGHT: 242.4 LBS | DIASTOLIC BLOOD PRESSURE: 98 MMHG | BODY MASS INDEX: 42.95 KG/M2 | TEMPERATURE: 99.3 F

## 2022-08-03 DIAGNOSIS — R25.1 PHYSIOLOGICAL TREMOR: ICD-10-CM

## 2022-08-03 DIAGNOSIS — M79.18 MYOFASCIAL MUSCLE PAIN: ICD-10-CM

## 2022-08-03 DIAGNOSIS — R30.0 DYSURIA: Primary | ICD-10-CM

## 2022-08-03 DIAGNOSIS — R42 DISEQUILIBRIUM: ICD-10-CM

## 2022-08-03 DIAGNOSIS — G43.109 BASILAR MIGRAINE: ICD-10-CM

## 2022-08-03 DIAGNOSIS — N30.90 CYSTITIS: ICD-10-CM

## 2022-08-03 DIAGNOSIS — G43.711 INTRACTABLE CHRONIC MIGRAINE WITHOUT AURA AND WITH STATUS MIGRAINOSUS: Primary | ICD-10-CM

## 2022-08-03 LAB
SL AMB  POCT GLUCOSE, UA: ABNORMAL
SL AMB LEUKOCYTE ESTERASE,UA: ABNORMAL
SL AMB POCT BILIRUBIN,UA: ABNORMAL
SL AMB POCT BLOOD,UA: ABNORMAL
SL AMB POCT CLARITY,UA: CLEAR
SL AMB POCT COLOR,UA: YELLOW
SL AMB POCT KETONES,UA: ABNORMAL
SL AMB POCT NITRITE,UA: ABNORMAL
SL AMB POCT PH,UA: 5
SL AMB POCT SPECIFIC GRAVITY,UA: 1.02
SL AMB POCT URINE PROTEIN: ABNORMAL
SL AMB POCT UROBILINOGEN: ABNORMAL

## 2022-08-03 PROCEDURE — 81002 URINALYSIS NONAUTO W/O SCOPE: CPT | Performed by: FAMILY MEDICINE

## 2022-08-03 PROCEDURE — 99214 OFFICE O/P EST MOD 30 MIN: CPT | Performed by: PHYSICIAN ASSISTANT

## 2022-08-03 PROCEDURE — 20552 NJX 1/MLT TRIGGER POINT 1/2: CPT | Performed by: PHYSICIAN ASSISTANT

## 2022-08-03 PROCEDURE — 87086 URINE CULTURE/COLONY COUNT: CPT | Performed by: FAMILY MEDICINE

## 2022-08-03 PROCEDURE — 99213 OFFICE O/P EST LOW 20 MIN: CPT | Performed by: FAMILY MEDICINE

## 2022-08-03 RX ORDER — KETOROLAC TROMETHAMINE 30 MG/ML
60 INJECTION, SOLUTION INTRAMUSCULAR; INTRAVENOUS ONCE
Status: COMPLETED | OUTPATIENT
Start: 2022-08-03 | End: 2022-08-03

## 2022-08-03 RX ORDER — LIDOCAINE HYDROCHLORIDE 10 MG/ML
2.5 INJECTION, SOLUTION INFILTRATION; PERINEURAL ONCE
Status: CANCELLED | OUTPATIENT
Start: 2022-08-03 | End: 2022-08-03

## 2022-08-03 RX ORDER — BUPIVACAINE HYDROCHLORIDE 2.5 MG/ML
2.5 INJECTION, SOLUTION EPIDURAL; INFILTRATION; INTRACAUDAL ONCE
Status: CANCELLED | OUTPATIENT
Start: 2022-08-03 | End: 2022-08-03

## 2022-08-03 RX ORDER — LIDOCAINE HYDROCHLORIDE 10 MG/ML
2.5 INJECTION, SOLUTION INFILTRATION; PERINEURAL ONCE
Status: COMPLETED | OUTPATIENT
Start: 2022-08-03 | End: 2022-08-03

## 2022-08-03 RX ORDER — BUPIVACAINE HYDROCHLORIDE 2.5 MG/ML
2.5 INJECTION, SOLUTION EPIDURAL; INFILTRATION; INTRACAUDAL ONCE
Status: COMPLETED | OUTPATIENT
Start: 2022-08-03 | End: 2022-08-03

## 2022-08-03 RX ORDER — SULFAMETHOXAZOLE AND TRIMETHOPRIM 800; 160 MG/1; MG/1
1 TABLET ORAL 2 TIMES DAILY
Qty: 10 TABLET | Refills: 0 | Status: SHIPPED | OUTPATIENT
Start: 2022-08-03 | End: 2022-08-08

## 2022-08-03 RX ORDER — MECLIZINE HCL 12.5 MG/1
12.5 TABLET ORAL 3 TIMES DAILY PRN
Qty: 30 TABLET | Refills: 0 | Status: SHIPPED | OUTPATIENT
Start: 2022-08-03

## 2022-08-03 RX ADMIN — BUPIVACAINE HYDROCHLORIDE 2.5 ML: 2.5 INJECTION, SOLUTION EPIDURAL; INFILTRATION; INTRACAUDAL at 13:29

## 2022-08-03 RX ADMIN — KETOROLAC TROMETHAMINE 60 MG: 30 INJECTION, SOLUTION INTRAMUSCULAR; INTRAVENOUS at 07:58

## 2022-08-03 RX ADMIN — LIDOCAINE HYDROCHLORIDE 2.5 ML: 10 INJECTION, SOLUTION INFILTRATION; PERINEURAL at 13:30

## 2022-08-03 NOTE — PROGRESS NOTES
Assessment/Plan:  Patient will increase fluids  Patient will have urine sent for culture  Patient will use Bactrim as direct       Diagnoses and all orders for this visit:    Dysuria  -     Urine culture; Future  -     Urine culture  -     sulfamethoxazole-trimethoprim (BACTRIM DS) 800-160 mg per tablet; Take 1 tablet by mouth 2 (two) times a day for 5 days    Cystitis  -     sulfamethoxazole-trimethoprim (BACTRIM DS) 800-160 mg per tablet; Take 1 tablet by mouth 2 (two) times a day for 5 days            Subjective:        Patient ID: Cindy Mason is a 33 y o  female  Patient is here with dysuria over the past day  No gross hematuria  Patient with increased urgency and frequency  No new back pain or suprapubic discomfort  No vomiting  No fevers  No treatment used  The following portions of the patient's history were reviewed and updated as appropriate: allergies, current medications, past family history, past medical history, past social history, past surgical history and problem list       Review of Systems   Constitutional: Negative  HENT: Negative  Eyes: Negative  Respiratory: Negative  Cardiovascular: Negative  Gastrointestinal: Negative  Endocrine: Negative  Genitourinary: Positive for dysuria, frequency and urgency  Musculoskeletal: Negative  Skin: Negative  Allergic/Immunologic: Negative  Neurological: Negative  Hematological: Negative  Psychiatric/Behavioral: Negative  Objective:               /98 (BP Location: Right arm, Patient Position: Sitting, Cuff Size: Standard)   Temp 99 3 °F (37 4 °C) (Temporal)   Ht 5' 3" (1 6 m)   Wt 110 kg (242 lb 6 4 oz)   BMI 42 94 kg/m²          Physical Exam  Vitals and nursing note reviewed  Constitutional:       General: She is not in acute distress  Appearance: Normal appearance  She is not ill-appearing, toxic-appearing or diaphoretic  HENT:      Head: Normocephalic and atraumatic  Eyes:      General: No scleral icterus  Right eye: No discharge  Left eye: No discharge  Extraocular Movements: Extraocular movements intact  Conjunctiva/sclera: Conjunctivae normal       Pupils: Pupils are equal, round, and reactive to light  Neck:      Vascular: No carotid bruit  Cardiovascular:      Rate and Rhythm: Normal rate and regular rhythm  Pulses: Normal pulses  Heart sounds: Normal heart sounds  No murmur heard  No friction rub  No gallop  Pulmonary:      Effort: Pulmonary effort is normal  No respiratory distress  Breath sounds: Normal breath sounds  No stridor  No wheezing, rhonchi or rales  Chest:      Chest wall: No tenderness  Abdominal:      General: Abdomen is flat  Bowel sounds are normal  There is no distension  Palpations: Abdomen is soft  Tenderness: There is no abdominal tenderness  There is no guarding or rebound  Musculoskeletal:      Cervical back: Normal range of motion and neck supple  No rigidity  No muscular tenderness  Lymphadenopathy:      Cervical: No cervical adenopathy  Skin:     General: Skin is warm and dry  Capillary Refill: Capillary refill takes less than 2 seconds  Coloration: Skin is not jaundiced  Findings: No bruising, erythema, lesion or rash  Neurological:      Mental Status: She is alert and oriented to person, place, and time  Mental status is at baseline  Cranial Nerves: No cranial nerve deficit  Sensory: No sensory deficit  Motor: No weakness  Coordination: Coordination normal       Gait: Gait normal    Psychiatric:         Mood and Affect: Mood normal          Behavior: Behavior normal          Thought Content:  Thought content normal          Judgment: Judgment normal

## 2022-08-03 NOTE — PROGRESS NOTES
Patient ID: Kaden Holman is a 33 y o  female  Assessment/Plan:     Diagnoses and all orders for this visit:    Intractable chronic migraine without aura and with status migrainosus  -     Ambulatory Referral to Neurology  -     MRI angiogram head without contrast; Future  -     ketorolac (TORADOL) 60 mg/2 mL IM injection 60 mg    Disequilibrium  -     EEG Sleep deprived; Future  -     meclizine (ANTIVERT) 12 5 MG tablet; Take 1 tablet (12 5 mg total) by mouth 3 (three) times a day as needed for dizziness    Basilar migraine  -     MRI angiogram head without contrast; Future    Myofascial muscle pain  -     Inj Trigger Point Single/Multiple  -     bupivacaine (PF) (MARCAINE) 0 25 % injection 2 5 mL  -     lidocaine (XYLOCAINE) 1 % injection 2 5 mL    Physiological tremor         Worsening migraine headaches with some new sxs of isolated events x2 of disorientation, disequilibrium, drunk feeling, and staring spells along with the migraine  She is following Rheumatology for worsening inflammatory condition as noted below and recently start Xeljanx  DDX vestibular migraine, less likely seizure event  Pt will have MRA head and EEG  She will note if recurrence of sxs occurs and let me know  Pt with nonfocal exam, all normal neurologically therefore defer repeat brain MRI, since HCT normal on 7/26/22  Would ask pt to follow up with Rheumatology and PCP regarding elevated platelets, WBCs  She is wondering about cortisol levels, which perhaps PCP could consider if appropriate  -- Toradol injection today  -- TPIs as well emergently/ part of ongoing care  -- Continue Aimovig injection q30 days  -- Continue Botox q90 days  -- consider verapamil    PRN migraine onset:  -- typically toradol or indocin are effective  -- decadron if above fails  -- triptans cause s/e  -- we are trying to get Nurtec covered right now    Consider brain MRI if sxs do not resolve quickly or if any new sxs present   She will keep in contact with me  Assuming new b/l hand tremor, likely physiologic, is a symptom of her overall condition/ pain worsening and will follow  Pt should get the TSH previously ordered  The patient should not hesitate to call me prior to her follow up with any questions or concerns  Subjective:    HPI    Ms Gio Kinney is a very pleasant 32 yo female here for neurological f/u for headache exacerbation  She continues botox with improvements  Since starting botox, the patient reports greater than 7 days of migraine relief from baseline, correlated with headache diary, decreased abortive medication use and decreased ER visits  The patient message me 7/25/2022 for the beginning of the autoimmune flare which triggered a bad migraine  She went to the ER couple times as noted in the chart and was getting cocktail but unfortunately the migraine headaches rebounded  Head CT is unremarkable, that was done on 7/26/2022  She states headaches were typical character as before, but more severe and were not going away with typical medications  She is concerned about new symptoms of a drunk feeling which occurred twice, associated with the migraine  First time it happened was the day of her Botox, immediately after she drove home from the injection  She had vomiting that day, and the second time it happened was last Sunday  She describes it as a feeling that she is moving in place while sitting  She felt wobbly and felt motion sickness  She felt like she was spacing out  This lasted an entire day  No vertigo/ spinning but did endorse a disequilibrium feeling  She also described an anxiety feeling which came on suddenly and then spacing out feeling  She tried to go to sleep when this happened  For recent headache Decadron did not help but it usually does, neither did toradol or indocin  Toradol inj in the ED- touched the pain a little  Last aimovig injection 7/20/22      Changed Nuvaring, 7/25/22  Recently started Albert Bodily- following rheum at  for inflammatory spondylopathy, fibromyalgia, costochondritis    Migraines:  Current pain: 4/10  Reaches pain level at worst: 10/10  Frequency: 2-3 migraines per month prior to this event; currently daily headache since end of July per chart  Duration: 1-2 days if able to catch it  Location: left frontal/ temporal, last night on right side, apex, headband region  Quality: throbbing  Worse with: not worse with bend, cough, sneeze  Associated with: nausea, vomiting, photophobia, phonophobia, dizziness, new sxs of drunk feeling and spacy    Triggers: Certain smells, sometimes caffeine, skipping meals, lack of sleep, menstrual cycle     Aura/ warning: none    Medications tried:  Prevention-  Aimovig- helping  Emgality  Depakote-side effects/allergy  Gabapentin  Methocarbamol  Tizanidine-allergy  Lyrica  Propranolol  Topamax, Trokendi XR  Zoloft    Abortive-  Tylenol  Decadron- typically helps break the cycle, did not this time unfortunately  Sumatriptan  Rizatriptan  toradol- takes first, then uses indocin if that fails (recently these have not been helping)  Indomethacin  Reglan, Zofran  Naproxen  Olanzapine-cannot take since on Abilify  ? Deann Gutierrez    Other non-medication therapies or treatments-  - TPIs  - CBD/ THC    Neck pain and description: none  Sleep concerns: sleeps okay    The following portions of the patient's history were reviewed and updated as appropriate:   She  has a past medical history of Abnormal Pap smear of cervix, Allergic, Anxiety, Arthritis, Depression, Diabetes mellitus ([facility] [address] ), Fibromyalgia, primary, Hypertension, IBS (irritable bowel syndrome), Migraine, Obesity, and Vitamin B12 deficiency    She   Patient Active Problem List    Diagnosis Date Noted    Disequilibrium 08/03/2022    Basilar migraine 08/03/2022    Myofascial muscle pain 08/03/2022    Physiological tremor 08/03/2022    Ankylosing spondylitis ([address] ) 06/22/2022    Functional diarrhea 05/23/2022    Parasitic infection 05/10/2022    Acute non-recurrent maxillary sinusitis 01/28/2022    Non-intractable vomiting with nausea 01/14/2022    Diarrhea of infectious origin 01/11/2022    Blurred vision 12/08/2021    Hypertension 12/08/2021    Hyperinsulinemia 10/20/2021    Gastroesophageal reflux disease without esophagitis 09/17/2021    Morbid obesity with BMI of 40 0-44 9, adult ([facility] [address] ) 08/04/2021    TMJ pain dysfunction syndrome 06/08/2021    Lower extremity edema 05/26/2021    Cystitis 03/17/2021    Pilar cysts 02/03/2021    Dysplastic nevi 02/03/2021    Encephalopathy 12/21/2020    Arthralgia of multiple sites 11/02/2020    Chronic low back pain 11/02/2020    Elevated C-reactive protein 11/02/2020    Inflammatory spondylopathy ([facility] [address] ) 11/02/2020    Knee pain 11/02/2020    Elevated blood pressure reading 07/13/2020    Pharyngitis due to Streptococcus species 06/02/2020    Paresthesias 04/28/2020    Well adult exam 90/14/9732    Other complicated headache syndrome 09/10/2019    Insomnia due to medical condition 09/10/2019    Chronic heel pain, left 08/28/2019    Irritant contact dermatitis due to oils 08/19/2019    Depression, major, recurrent, moderate ([facility] [address] ) 07/01/2019    URI (upper respiratory infection) 04/24/2019    Hand dermatitis 04/12/2019    Intractable migraine with aura without status migrainosus 01/11/2019    Sleep disturbance 11/27/2018    Snoring 11/27/2018    Fibromyalgia syndrome 11/27/2018    Obesity (BMI 30-39 9) 11/27/2018    Upper respiratory infection 11/13/2018    Dental infection 09/12/2018    Possible pregnancy 07/26/2018    Migraine with aura and with status migrainosus 07/23/2018    Cognitive decline 06/27/2018    Memory loss 06/15/2018    Chronic fatigue 05/24/2018    Alternating constipation and diarrhea 04/17/2018    Abnormal bowel habits 04/17/2018    Chronic migraine without aura without status migrainosus, not intractable 03/13/2018    Anterior chest wall pain 02/22/2018    Migraine headache 06/22/2017    Cervical radiculitis 06/10/2016    Seasonal allergies 03/24/2015    Lumbar radiculopathy 01/05/2015    Vitamin B12 deficiency 07/30/2014    Hyperlipidemia 03/24/2014    Vitamin D deficiency 02/26/2014    Generalized anxiety disorder 11/19/2012     She  has a past surgical history that includes Tonsillectomy; [place] tooth extraction; Colonoscopy (N/A, 5/8/2018); pr exc skin benig >4 cm remaindr body (N/A, 7/1/2021); and pr recmpl wnd scalp,extr 2 6-7 5 cm (N/A, 7/1/2021)  Her family history includes Alcohol abuse in her father and mother; Anxiety disorder in her father; Cancer in her other; Depression in her maternal grandmother; Diabetes in her other; Diabetes unspecified in her mother; Drug abuse in her mother; Hypertension in her mother; Lung cancer in her maternal grandfather; Other in her mother and other; Psoriasis in her mother; Rheum arthritis in her mother; Sjogren's syndrome in her mother  She  reports that she has never smoked  She has never used smokeless tobacco  She reports current alcohol use  She reports current drug use  Frequency: 2 00 times per week  Drug: Marijuana  Current Outpatient Medications   Medication Sig Dispense Refill    Aimovig 140 MG/ML SOAJ Inject 140mg under the skin every 30 (thirty) days  1 mL 10    ALPRAZolam (XANAX) 0 5 mg tablet Take 1 tablet (0 5 mg total) by mouth as needed in the morning and 1 tablet (0 5 mg total) as needed in the evening for anxiety  60 tablet 2    ARIPiprazole (ABILIFY) 2 mg tablet Take 1 tablet (2 mg total) by mouth daily 30 tablet 2    B-D 3CC LUER-DEANN SYR 25GX1" 25G X 1" 3 ML MISC        buPROPion (WELLBUTRIN XL) 300 mg 24 hr tablet Take 1 tablet (300 mg total) by mouth in the morning   30 tablet 2    dicyclomine (BENTYL) 20 mg tablet Take 1 tablet (20 mg total) by mouth 2 (two) times a day 60 tablet 1    diphenoxylate-atropine (LOMOTIL) 2 5-0 025 mg per tablet 1 tablet 4 times daily as needed for diarrhea 30 tablet 1    escitalopram (LEXAPRO) 20 mg tablet Take 1 tablet (20 mg total) by mouth in the morning  30 tablet 2    etonogestrel-ethinyl estradiol (NuvaRing) 0 12-0 015 MG/24HR vaginal ring Insert ring for 21 days then remove for one week  3 each 0    indomethacin (INDOCIN) 25 mg capsule 1 tab BID prn severe headache with food  Hold toradol  30 capsule 0    ketorolac (TORADOL) 10 mg tablet Take 1 tablet (10 mg total) by mouth every 6 (six) hours as needed (migraine) Max 2-3 per week  10 tablet 0    meclizine (ANTIVERT) 12 5 MG tablet Take 1 tablet (12 5 mg total) by mouth 3 (three) times a day as needed for dizziness 30 tablet 0    metFORMIN (GLUCOPHAGE-XR) 500 mg 24 hr tablet Take 2 tablets (1,000 mg total) by mouth daily with breakfast 60 tablet 2    olmesartan (BENICAR) 20 mg tablet Take 1 tablet (20 mg total) by mouth in the morning  30 tablet 2    onabotulinumtoxin A (BOTOX) 100 units Inject as directed      ondansetron (ZOFRAN) 4 mg tablet Take 1 tablet (4 mg total) by mouth every 6 (six) hours 30 tablet 2    pantoprazole (PROTONIX) 40 mg tablet Take 1 tablet (40 mg total) by mouth daily 30 tablet 3    prazosin (MINIPRESS) 1 mg capsule Take 1 capsule (1 mg total) by mouth daily at bedtime 30 capsule 0    Rimegepant Sulfate (NURTEC) 75 MG TBDP 1 tab at migraine onset  No more than one dose per 24 hours  Hold triptan  8 tablet 2    rizatriptan (MAXALT-MLT) 10 mg disintegrating tablet 1 tab at migraine onset, repeat after 2 hours if needed; Max 2 per day and 3 per week  9 tablet 2    Syringe/Needle, Disp, (SYRINGE 3CC/06GW5-1/4") 27G X 1-1/4" 3 ML MISC Use for IM injection of ketorolac  4 each 0    dexamethasone (DECADRON) 2 mg tablet 1 tab qam with food prn migraine   (Patient not taking: No sig reported) 5 tablet 0    metoclopramide (Reglan) 10 mg tablet Take 1 tablet (10 mg total) by mouth every 6 (six) hours (Patient not taking: No sig reported) 30 tablet 0     Current Facility-Administered Medications   Medication Dose Route Frequency Provider Last Rate Last Admin    cyanocobalamin injection 1,000 mcg  1,000 mcg Intramuscular Q30 Days Barnetta Sprinkles, DO   1,000 mcg at 08/03/20 0859    cyanocobalamin injection 1,000 mcg  1,000 mcg Intramuscular Q14 Days Barnetta Sprinkles, DO   1,000 mcg at 05/18/20 1146    cyanocobalamin injection 1,000 mcg  1,000 mcg Intramuscular Q30 Days Barnetta Sprinkles, DO   1,000 mcg at 09/01/20 1129     She is allergic to [employer] pegol], desvenlafaxine, ixekizumab, valproic acid, and tizanidine            Objective:    Blood pressure 125/85, pulse 93, weight 110 kg (241 lb 12 8 oz), not currently breastfeeding  Body mass index is 42 83 kg/m²  Physical Exam    Neurological Exam  Vital signs reviewed  Well developed, well nourished  Head: Normocephalic, atraumatic  Neck: Neck flexors 5/5, mild TTP R occipital/ suboccipital area  CN 2-12: intact and symmetric, including EOMs which are normal b/l and PERRL  Fundi b/l are normal to crude ophthalmological examination  MSK: 5/5 t/o  ROM normal x all 4 extr  No pronator drift  Sensation: Inact to LT and temp x4 extr  Romberg with minimal sway  Reflexes: 2+ and symmetric in all 4 extr  Coordination: Nml x4 extr  Gait: Steady normal gait, tandem gait is steady  ROS:    Review of Systems   Constitutional: Negative  Negative for appetite change and fever  HENT: Negative  Negative for hearing loss, tinnitus, trouble swallowing and voice change  Eyes: Negative  Negative for photophobia and pain  Respiratory: Negative  Negative for shortness of breath  Cardiovascular: Negative  Negative for palpitations  Gastrointestinal: Negative  Negative for nausea and vomiting  Endocrine: Negative  Negative for cold intolerance  Genitourinary: Negative  Negative for dysuria, frequency and urgency  Musculoskeletal: Negative    Negative for myalgias and neck pain  Skin: Negative  Negative for rash  Neurological: Positive for tremors  Negative for dizziness, seizures, syncope, facial asymmetry, speech difficulty, weakness, light-headedness, numbness and headaches  Hematological: Negative  Does not bruise/bleed easily  Psychiatric/Behavioral: Negative  Negative for confusion, hallucinations and sleep disturbance  The following portions of the patient's history were reviewed and updated as appropriate: allergies, current medications/ medication history, past family history, past medical history, past social history, past surgical history and problem list     Review of systems was reviewed and otherwise unremarkable from a neurological perspective  I have spent 30 minutes with the patient today in which greater than 50% of this time was spent in counseling/coordination of care regarding diagnoses, test results, impression, plan and potential medication side effects  Universal Protocol:  Consent: The procedure was performed in an emergent situation  Verbal consent obtained  Risks and benefits: risks, benefits and alternatives were discussed  Consent given by: patient    Procedure Details  Location(s):  Patient tolerance: patient tolerated the procedure well with no immediate complications  Additional procedure details: Trigger point injections were performed on an emergent basis and are a part of ongoing therapy  Risks, benefits, alternatives, infection, bleeding and allergic reaction were discussed with the patient  Verbal consent was obtained prior to the procedure and is detailed in the patient's record  Trigger point injections were performed in the following locations using a mixture of 2 5 mL 0 25% bupivacaine + 2 5 mL of 1% lidocaine, without steroid, using a 30 gauge, 1/2 inch needle: R and L anterior temporalis muscles, apex/ crown of head/ scalp

## 2022-08-04 ENCOUNTER — HOSPITAL ENCOUNTER (EMERGENCY)
Facility: HOSPITAL | Age: 32
Discharge: HOME | End: 2022-08-04
Attending: EMERGENCY MEDICINE | Admitting: EMERGENCY MEDICINE
Payer: COMMERCIAL

## 2022-08-04 VITALS
BODY MASS INDEX: 42.88 KG/M2 | TEMPERATURE: 98 F | HEART RATE: 94 BPM | DIASTOLIC BLOOD PRESSURE: 86 MMHG | OXYGEN SATURATION: 98 % | RESPIRATION RATE: 16 BRPM | WEIGHT: 242 LBS | SYSTOLIC BLOOD PRESSURE: 146 MMHG | HEIGHT: 63 IN

## 2022-08-04 DIAGNOSIS — G43.909 MIGRAINE: Primary | ICD-10-CM

## 2022-08-04 LAB
ALBUMIN SERPL BCP-MCNC: 3.7 G/DL (ref 3.5–5)
ALP SERPL-CCNC: 54 U/L (ref 34–104)
ALT SERPL W P-5'-P-CCNC: 11 U/L (ref 7–52)
ANION GAP SERPL CALCULATED.3IONS-SCNC: 10 MMOL/L (ref 4–13)
AST SERPL W P-5'-P-CCNC: 11 U/L (ref 13–39)
BASOPHILS # BLD AUTO: 0.04 THOUSANDS/ΜL (ref 0–0.1)
BASOPHILS NFR BLD AUTO: 0 % (ref 0–1)
BILIRUB SERPL-MCNC: 0.41 MG/DL (ref 0.2–1)
BUN SERPL-MCNC: 11 MG/DL (ref 5–25)
CALCIUM SERPL-MCNC: 9.3 MG/DL (ref 8.4–10.2)
CHLORIDE SERPL-SCNC: 103 MMOL/L (ref 96–108)
CO2 SERPL-SCNC: 22 MMOL/L (ref 21–32)
CREAT SERPL-MCNC: 0.78 MG/DL (ref 0.6–1.3)
EOSINOPHIL # BLD AUTO: 0.05 THOUSAND/ΜL (ref 0–0.61)
EOSINOPHIL NFR BLD AUTO: 0 % (ref 0–6)
ERYTHROCYTE [DISTWIDTH] IN BLOOD BY AUTOMATED COUNT: 13.7 % (ref 11.6–15.1)
EXT PREG TEST URINE: NEGATIVE
EXT. CONTROL ED NAV: NORMAL
GFR SERPL CREATININE-BSD FRML MDRD: 101 ML/MIN/1.73SQ M
GLUCOSE SERPL-MCNC: 97 MG/DL (ref 65–140)
HCT VFR BLD AUTO: 37.3 % (ref 34.8–46.1)
HGB BLD-MCNC: 12.1 G/DL (ref 11.5–15.4)
IMM GRANULOCYTES # BLD AUTO: 0.05 THOUSAND/UL (ref 0–0.2)
IMM GRANULOCYTES NFR BLD AUTO: 0 % (ref 0–2)
LYMPHOCYTES # BLD AUTO: 1.98 THOUSANDS/ΜL (ref 0.6–4.47)
LYMPHOCYTES NFR BLD AUTO: 14 % (ref 14–44)
MCH RBC QN AUTO: 28.1 PG (ref 26.8–34.3)
MCHC RBC AUTO-ENTMCNC: 32.4 G/DL (ref 31.4–37.4)
MCV RBC AUTO: 87 FL (ref 82–98)
MONOCYTES # BLD AUTO: 0.35 THOUSAND/ΜL (ref 0.17–1.22)
MONOCYTES NFR BLD AUTO: 2 % (ref 4–12)
NEUTROPHILS # BLD AUTO: 12.12 THOUSANDS/ΜL (ref 1.85–7.62)
NEUTS SEG NFR BLD AUTO: 84 % (ref 43–75)
NRBC BLD AUTO-RTO: 0 /100 WBCS
PLATELET # BLD AUTO: 555 THOUSANDS/UL (ref 149–390)
PMV BLD AUTO: 8.7 FL (ref 8.9–12.7)
POTASSIUM SERPL-SCNC: 4.4 MMOL/L (ref 3.5–5.3)
PROT SERPL-MCNC: 6.7 G/DL (ref 6.4–8.4)
RBC # BLD AUTO: 4.3 MILLION/UL (ref 3.81–5.12)
SODIUM SERPL-SCNC: 135 MMOL/L (ref 135–147)
WBC # BLD AUTO: 14.59 THOUSAND/UL (ref 4.31–10.16)

## 2022-08-04 PROCEDURE — 99284 EMERGENCY DEPT VISIT MOD MDM: CPT | Performed by: PHYSICIAN ASSISTANT

## 2022-08-04 PROCEDURE — 81025 URINE PREGNANCY TEST: CPT | Performed by: PHYSICIAN ASSISTANT

## 2022-08-04 PROCEDURE — 99283 EMERGENCY DEPT VISIT LOW MDM: CPT

## 2022-08-04 PROCEDURE — 96375 TX/PRO/DX INJ NEW DRUG ADDON: CPT

## 2022-08-04 PROCEDURE — 85025 COMPLETE CBC W/AUTO DIFF WBC: CPT | Performed by: PHYSICIAN ASSISTANT

## 2022-08-04 PROCEDURE — 80053 COMPREHEN METABOLIC PANEL: CPT | Performed by: PHYSICIAN ASSISTANT

## 2022-08-04 PROCEDURE — 96365 THER/PROPH/DIAG IV INF INIT: CPT

## 2022-08-04 PROCEDURE — 36415 COLL VENOUS BLD VENIPUNCTURE: CPT | Performed by: PHYSICIAN ASSISTANT

## 2022-08-04 RX ORDER — MAGNESIUM SULFATE HEPTAHYDRATE 40 MG/ML
2 INJECTION, SOLUTION INTRAVENOUS ONCE
Status: COMPLETED | OUTPATIENT
Start: 2022-08-04 | End: 2022-08-04

## 2022-08-04 RX ORDER — KETOROLAC TROMETHAMINE 30 MG/ML
30 INJECTION, SOLUTION INTRAMUSCULAR; INTRAVENOUS ONCE
Status: COMPLETED | OUTPATIENT
Start: 2022-08-04 | End: 2022-08-04

## 2022-08-04 RX ORDER — METOCLOPRAMIDE HYDROCHLORIDE 5 MG/ML
10 INJECTION INTRAMUSCULAR; INTRAVENOUS ONCE
Status: COMPLETED | OUTPATIENT
Start: 2022-08-04 | End: 2022-08-04

## 2022-08-04 RX ORDER — DIPHENHYDRAMINE HYDROCHLORIDE 50 MG/ML
25 INJECTION INTRAMUSCULAR; INTRAVENOUS ONCE
Status: COMPLETED | OUTPATIENT
Start: 2022-08-04 | End: 2022-08-04

## 2022-08-04 RX ADMIN — MAGNESIUM SULFATE HEPTAHYDRATE 2 G: 40 INJECTION, SOLUTION INTRAVENOUS at 14:47

## 2022-08-04 RX ADMIN — DIPHENHYDRAMINE HYDROCHLORIDE 25 MG: 50 INJECTION, SOLUTION INTRAMUSCULAR; INTRAVENOUS at 14:50

## 2022-08-04 RX ADMIN — SODIUM CHLORIDE 1000 ML: 0.9 INJECTION, SOLUTION INTRAVENOUS at 14:45

## 2022-08-04 RX ADMIN — METOCLOPRAMIDE HYDROCHLORIDE 10 MG: 5 INJECTION INTRAMUSCULAR; INTRAVENOUS at 14:50

## 2022-08-04 RX ADMIN — KETOROLAC TROMETHAMINE 30 MG: 30 INJECTION, SOLUTION INTRAMUSCULAR at 14:50

## 2022-08-05 ENCOUNTER — TELEPHONE (OUTPATIENT)
Dept: NEUROLOGY | Facility: CLINIC | Age: 32
End: 2022-08-05

## 2022-08-05 DIAGNOSIS — G43.711 INTRACTABLE CHRONIC MIGRAINE WITHOUT AURA AND WITH STATUS MIGRAINOSUS: Primary | ICD-10-CM

## 2022-08-05 LAB — BACTERIA UR CULT: NORMAL

## 2022-08-05 RX ORDER — ACETAZOLAMIDE 250 MG/1
TABLET ORAL
Qty: 30 TABLET | Refills: 0 | Status: SHIPPED | OUTPATIENT
Start: 2022-08-05 | End: 2022-09-29

## 2022-08-05 NOTE — TELEPHONE ENCOUNTER
----- Message from Nahomy Haynes sent at 8/3/2022  2:56 PM EDT -----  Regarding: FW: Migraine     ----- Message -----  From: Reta Ybarra  Sent: 8/3/2022   2:52 PM EDT  To: Neurology [facility]  Subject: Migraine                                         My migraine has not gone  it's been getting worse since this morning  Mostly throbbing and pressure  I'm not sure what to do

## 2022-08-05 NOTE — TELEPHONE ENCOUNTER
Patient was seen in ED for migraine yesterday  Patient sent this my chart message in today:    Is it possible to send one of the medications we talked about on Wednesday? I went back to the er yesterday  I got a few hours of relief but it's back again this morning  I don't know why this won't break  Thanks      Please provide recommendation, thank you

## 2022-08-05 NOTE — ED PROVIDER NOTES
History  Chief Complaint   Patient presents with    Migraine     29-year-old female presents to the emergency department seeking evaluation for a migraine  Patient states that she has frequent migraines and this migraine is similar to previous  Patient states that she has tried her usual home medications without relief  She overall is well-appearing in no acute distress  No red flag symptoms  she requesting migraine cocktail as this typically works well for her  Allergies reviewed          Prior to Admission Medications   Prescriptions Last Dose Informant Patient Reported? Taking? ALPRAZolam (XANAX) 0 5 mg tablet  Self No No   Sig: Take 1 tablet (0 5 mg total) by mouth as needed in the morning and 1 tablet (0 5 mg total) as needed in the evening for anxiety  ARIPiprazole (ABILIFY) 2 mg tablet  Self No No   Sig: Take 1 tablet (2 mg total) by mouth daily   Aimovig 140 MG/ML SOAJ  Self No No   Sig: Inject 140mg under the skin every 30 (thirty) days  B-D 3CC LUER-DEANN SYR 25GX1" 25G X 1" 3 ML MISC  Self Yes No   Sig:     Rimegepant Sulfate (NURTEC) 75 MG TBDP   No No   Si tab at migraine onset  No more than one dose per 24 hours  Hold triptan  Syringe/Needle, Disp, (SYRINGE 3CC/46PC8-4/4") 27G X 1-1/4" 3 ML MISC  Self No No   Sig: Use for IM injection of ketorolac  buPROPion (WELLBUTRIN XL) 300 mg 24 hr tablet  Self No No   Sig: Take 1 tablet (300 mg total) by mouth in the morning  dexamethasone (DECADRON) 2 mg tablet   No No   Si tab qam with food prn migraine     Patient not taking: No sig reported   dicyclomine (BENTYL) 20 mg tablet   No No   Sig: Take 1 tablet (20 mg total) by mouth 2 (two) times a day   diphenoxylate-atropine (LOMOTIL) 2 5-0 025 mg per tablet  Self No No   Si tablet 4 times daily as needed for diarrhea   escitalopram (LEXAPRO) 20 mg tablet  Self No No   Sig: Take 1 tablet (20 mg total) by mouth in the morning    etonogestrel-ethinyl estradiol (NuvaRing) 0 12-0 015 MG/24HR vaginal ring   No No   Sig: Insert ring for 21 days then remove for one week  indomethacin (INDOCIN) 25 mg capsule   No No   Si tab BID prn severe headache with food  Hold toradol    ketorolac (TORADOL) 10 mg tablet  Self No No   Sig: Take 1 tablet (10 mg total) by mouth every 6 (six) hours as needed (migraine) Max 2-3 per week  meclizine (ANTIVERT) 12 5 MG tablet   No No   Sig: Take 1 tablet (12 5 mg total) by mouth 3 (three) times a day as needed for dizziness   metFORMIN (GLUCOPHAGE-XR) 500 mg 24 hr tablet   No No   Sig: Take 2 tablets (1,000 mg total) by mouth daily with breakfast   metoclopramide (Reglan) 10 mg tablet   No No   Sig: Take 1 tablet (10 mg total) by mouth every 6 (six) hours   Patient not taking: No sig reported   olmesartan (BENICAR) 20 mg tablet  Self No No   Sig: Take 1 tablet (20 mg total) by mouth in the morning  onabotulinumtoxin A (BOTOX) 100 units  Self Yes No   Sig: Inject as directed   ondansetron (ZOFRAN) 4 mg tablet   No No   Sig: Take 1 tablet (4 mg total) by mouth every 6 (six) hours   pantoprazole (PROTONIX) 40 mg tablet   No No   Sig: Take 1 tablet (40 mg total) by mouth daily   prazosin (MINIPRESS) 1 mg capsule   No No   Sig: Take 1 capsule (1 mg total) by mouth daily at bedtime   rizatriptan (MAXALT-MLT) 10 mg disintegrating tablet   No No   Si tab at migraine onset, repeat after 2 hours if needed; Max 2 per day and 3 per week     sulfamethoxazole-trimethoprim (BACTRIM DS) 800-160 mg per tablet   No No   Sig: Take 1 tablet by mouth 2 (two) times a day for 5 days      Facility-Administered Medications Last Administration Doses Remaining   cyanocobalamin injection 1,000 mcg 8/3/2020  8:59 AM    cyanocobalamin injection 1,000 mcg 2020 11:46 AM    cyanocobalamin injection 1,000 mcg 2020 11:29 AM           Past Medical History:   Diagnosis Date    Abnormal Pap smear of cervix     Allergic     Anxiety     Arthritis     Depression     Diabetes mellitus ([facility] [address] )     Fibromyalgia, primary     Hypertension     IBS (irritable bowel syndrome)     Migraine     Obesity     Vitamin B12 deficiency        Past Surgical History:   Procedure Laterality Date    COLONOSCOPY N/A 5/8/2018    Procedure: COLONOSCOPY;  Surgeon: Kameron Chirinos MD;  Location: [facility]; Service: Gastroenterology    OR EXC SKIN BENIG >4 CM REMAINDR BODY N/A 7/1/2021    Procedure: EXCISION OF PILAR SCALP CYST X 2 AND RIGHT INNER GROIN NEVVUS;  Surgeon: Viola Donaldson MD;  Location:  MAIN OR;  Service: Plastics    OR RECMPL WND SCALP,EXTR 2 6-7 5 CM N/A 7/1/2021    Procedure: CLOSURE WOUND SCALP X 2 AND RIGHT INNER GROIN;  Surgeon: Viola Donaldson MD;  Location:  MAIN OR;  Service: Plastics    TONSILLECTOMY      WISDOM TOOTH EXTRACTION         Family History   Problem Relation Age of Onset    Rheum arthritis Mother     Psoriasis Mother     Other Mother     Hypertension Mother     Diabetes unspecified Mother     Sjogren's syndrome Mother     Alcohol abuse Mother     Drug abuse Mother     Anxiety disorder Father     Alcohol abuse Father     Lung cancer Maternal Grandfather     Cancer Other         bone    Diabetes Other     Other Other         High blood pressure    Depression Maternal Grandmother      I have reviewed and agree with the history as documented      E-Cigarette/Vaping    E-Cigarette Use Former User     Start Date 7/1/19     Comments 1 cartridge every 2 months       E-Cigarette/Vaping Substances    Nicotine No     THC Yes     CBD Yes     Flavoring No     Other No     Unknown No      Social History     Tobacco Use    Smoking status: Never Smoker    Smokeless tobacco: Never Used   Vaping Use    Vaping Use: Former    Start date: 7/1/2019    Substances: THC, CBD   Substance Use Topics    Alcohol use: Yes     Comment: rarely    Drug use: Yes     Frequency: 2 0 times per week     Types: Marijuana     Comment: medical marijuana (vape) Review of Systems   Constitutional: Negative for chills, fatigue and fever  HENT: Negative for congestion, ear pain, rhinorrhea, sinus pressure, sneezing, sore throat and trouble swallowing  Eyes: Negative for discharge and itching  Respiratory: Negative for cough, chest tightness, shortness of breath, wheezing and stridor  Cardiovascular: Negative for chest pain and palpitations  Gastrointestinal: Negative for abdominal pain, diarrhea, nausea and vomiting  Neurological: Positive for headaches  Negative for dizziness, syncope and numbness  All other systems reviewed and are negative  Physical Exam  Physical Exam  Vitals and nursing note reviewed  Constitutional:       General: She is not in acute distress  Appearance: She is well-developed  She is not diaphoretic  HENT:      Head: Normocephalic and atraumatic  Right Ear: External ear normal       Left Ear: External ear normal       Nose: Nose normal    Eyes:      Conjunctiva/sclera: Conjunctivae normal       Pupils: Pupils are equal, round, and reactive to light  Cardiovascular:      Rate and Rhythm: Normal rate and regular rhythm  Heart sounds: Normal heart sounds  No murmur heard  No friction rub  No gallop  Pulmonary:      Effort: Pulmonary effort is normal  No respiratory distress  Breath sounds: Normal breath sounds  No stridor  No wheezing or rales  Abdominal:      General: Bowel sounds are normal  There is no distension  Palpations: Abdomen is soft  Tenderness: There is no abdominal tenderness  There is no guarding  Musculoskeletal:         General: No tenderness  Normal range of motion  Cervical back: Normal range of motion  Skin:     General: Skin is warm  Capillary Refill: Capillary refill takes less than 2 seconds  Neurological:      Mental Status: She is alert and oriented to person, place, and time           Vital Signs  ED Triage Vitals [08/04/22 1404]   Temperature Pulse Respirations Blood Pressure SpO2   98 °F (36 7 °C) 94 16 146/86 98 %      Temp Source Heart Rate Source Patient Position - Orthostatic VS BP Location FiO2 (%)   Tympanic Monitor Sitting Right arm --      Pain Score       6           Vitals:    08/04/22 1404   BP: 146/86   Pulse: 94   Patient Position - Orthostatic VS: Sitting         Visual Acuity      ED Medications  Medications   sodium chloride 0 9 % bolus 1,000 mL (0 mL Intravenous Stopped 8/4/22 1545)   diphenhydrAMINE (BENADRYL) injection 25 mg (25 mg Intravenous Given 8/4/22 1450)   metoclopramide (REGLAN) injection 10 mg (10 mg Intravenous Given 8/4/22 1450)   ketorolac (TORADOL) injection 30 mg (30 mg Intravenous Given 8/4/22 1450)   magnesium sulfate 2 g/50 mL IVPB (premix) 2 g (0 g Intravenous Stopped 8/4/22 1547)       Diagnostic Studies  Results Reviewed     Procedure Component Value Units Date/Time    Comprehensive metabolic panel [[id number]]  (Abnormal) Collected: 08/04/22 1437    Lab Status: Final result Specimen: Blood from Arm, Right Updated: 08/04/22 1502     Sodium 135 mmol/L      Potassium 4 4 mmol/L      Chloride 103 mmol/L      CO2 22 mmol/L      ANION GAP 10 mmol/L      BUN 11 mg/dL      Creatinine 0 78 mg/dL      Glucose 97 mg/dL      Calcium 9 3 mg/dL      AST 11 U/L      ALT 11 U/L      Alkaline Phosphatase 54 U/L      Total Protein 6 7 g/dL      Albumin 3 7 g/dL      Total Bilirubin 0 41 mg/dL      eGFR 101 ml/min/1 73sq m     Narrative:      [place] guidelines for Chronic Kidney Disease (CKD):     Stage 1 with normal or high GFR (GFR > 90 mL/min/1 73 square meters)    Stage 2 Mild CKD (GFR = 60-89 mL/min/1 73 square meters)    Stage 3A Moderate CKD (GFR = 45-59 mL/min/1 73 square meters)    Stage 3B Moderate CKD (GFR = 30-44 mL/min/1 73 square meters)    Stage 4 Severe CKD (GFR = 15-29 mL/min/1 73 square meters)    Stage 5 End Stage CKD (GFR <15 mL/min/1 73 square meters)  Note: GFR calculation is accurate only with a steady state creatinine    CBC and differential [[id number]]  (Abnormal) Collected: 08/04/22 1437    Lab Status: Final result Specimen: Blood from Arm, Right Updated: 08/04/22 1446     WBC 14 59 Thousand/uL      RBC 4 30 Million/uL      Hemoglobin 12 1 g/dL      Hematocrit 37 3 %      MCV 87 fL      MCH 28 1 pg      MCHC 32 4 g/dL      RDW 13 7 %      MPV 8 7 fL      Platelets 768 Thousands/uL      nRBC 0 /100 WBCs      Neutrophils Relative 84 %      Immat GRANS % 0 %      Lymphocytes Relative 14 %      Monocytes Relative 2 %      Eosinophils Relative 0 %      Basophils Relative 0 %      Neutrophils Absolute 12 12 Thousands/µL      Immature Grans Absolute 0 05 Thousand/uL      Lymphocytes Absolute 1 98 Thousands/µL      Monocytes Absolute 0 35 Thousand/µL      Eosinophils Absolute 0 05 Thousand/µL      Basophils Absolute 0 04 Thousands/µL     POCT pregnancy, urine [[id number]]  (Normal) Resulted: 08/04/22 1444    Lab Status: Final result Specimen: Urine Updated: 08/04/22 1445     EXT PREG TEST UR (Ref: Negative) negative     Control valid                 No orders to display              Procedures  Procedures         ED Course                                             MDM  Number of Diagnoses or Management Options  Migraine  Diagnosis management comments: Migraine fitting patient's prior history of  Medical records reviewed  Patient reports dramatic relief in symptoms with treatment in the emergency department  Patient educated regarding their diagnosis and given return and follow-up instructions  Patient was advised to returned to the ED with worsening symptoms or concerns  Patient is understanding of and in agreement with the treatment plan  There are no questions at the time of discharge         Amount and/or Complexity of Data Reviewed  Clinical lab tests: ordered and reviewed    Risk of Complications, Morbidity, and/or Mortality  Presenting problems: low  Diagnostic procedures: low  Management options: low    Patient Progress  Patient progress: stable      Disposition  Final diagnoses:   Migraine     Time reflects when diagnosis was documented in both MDM as applicable and the Disposition within this note     Time User Action Codes Description Comment    8/4/2022  3:39 PM Ling Maxwell [[id number]] Migraine       ED Disposition     ED Disposition   Discharge    Condition   Stable    Date/Time   Thu Aug 4, 2022  3:39 PM    Comment   Norbertcrow sada discharge to home/self care  Follow-up Information     Follow up With Specialties Details Why [address] ,  Family Medicine   [address] [place] [address]            Discharge Medication List as of 8/4/2022  3:39 PM      CONTINUE these medications which have NOT CHANGED    Details   Aimovig 140 MG/ML SOAJ Inject 140mg under the skin every 30 (thirty) days  , Normal      ALPRAZolam (XANAX) 0 5 mg tablet Take 1 tablet (0 5 mg total) by mouth as needed in the morning and 1 tablet (0 5 mg total) as needed in the evening for anxiety  , Starting Wed 5/18/2022, Normal      ARIPiprazole (ABILIFY) 2 mg tablet Take 1 tablet (2 mg total) by mouth daily, Starting Tue 5/10/2022, Normal      !! B-D 3CC LUER-DEANN SYR 25GX1" 25G X 1" 3 ML MISC  , Starting Thu 7/26/2018, Historical Med      buPROPion (WELLBUTRIN XL) 300 mg 24 hr tablet Take 1 tablet (300 mg total) by mouth in the morning , Starting Wed 5/18/2022, Normal      dexamethasone (DECADRON) 2 mg tablet 1 tab qam with food prn migraine , Normal      dicyclomine (BENTYL) 20 mg tablet Take 1 tablet (20 mg total) by mouth 2 (two) times a day, Starting Fri 5/27/2022, Normal      diphenoxylate-atropine (LOMOTIL) 2 5-0 025 mg per tablet 1 tablet 4 times daily as needed for diarrhea, Normal      escitalopram (LEXAPRO) 20 mg tablet Take 1 tablet (20 mg total) by mouth in the morning , Starting Wed 5/18/2022, Normal      etonogestrel-ethinyl estradiol (NuvaRing) 0 12-0 015 MG/24HR vaginal ring Insert ring for 21 days then remove for one week , Normal      indomethacin (INDOCIN) 25 mg capsule 1 tab BID prn severe headache with food  Hold toradol , Normal      ketorolac (TORADOL) 10 mg tablet Take 1 tablet (10 mg total) by mouth every 6 (six) hours as needed (migraine) Max 2-3 per week , Starting Tue 3/22/2022, Normal      meclizine (ANTIVERT) 12 5 MG tablet Take 1 tablet (12 5 mg total) by mouth 3 (three) times a day as needed for dizziness, Starting Wed 8/3/2022, Normal      metFORMIN (GLUCOPHAGE-XR) 500 mg 24 hr tablet Take 2 tablets (1,000 mg total) by mouth daily with breakfast, Starting Tue 5/10/2022, Normal      metoclopramide (Reglan) 10 mg tablet Take 1 tablet (10 mg total) by mouth every 6 (six) hours, Starting Sat 5/21/2022, Normal      olmesartan (BENICAR) 20 mg tablet Take 1 tablet (20 mg total) by mouth in the morning , Starting Mon 5/23/2022, Normal      onabotulinumtoxin A (BOTOX) 100 units Inject as directed, Starting Tue 8/29/2017, Historical Med      ondansetron (ZOFRAN) 4 mg tablet Take 1 tablet (4 mg total) by mouth every 6 (six) hours, Starting Fri 5/27/2022, Normal      pantoprazole (PROTONIX) 40 mg tablet Take 1 tablet (40 mg total) by mouth daily, Starting Fri 5/27/2022, Normal      prazosin (MINIPRESS) 1 mg capsule Take 1 capsule (1 mg total) by mouth daily at bedtime, Starting Fri 7/29/2022, Normal      Rimegepant Sulfate (NURTEC) 75 MG TBDP 1 tab at migraine onset  No more than one dose per 24 hours  Hold triptan , Normal      rizatriptan (MAXALT-MLT) 10 mg disintegrating tablet 1 tab at migraine onset, repeat after 2 hours if needed; Max 2 per day and 3 per week , Normal      sulfamethoxazole-trimethoprim (BACTRIM DS) 800-160 mg per tablet Take 1 tablet by mouth 2 (two) times a day for 5 days, Starting Wed 8/3/2022, Until Mon 8/8/2022, Normal      !!  Syringe/Needle, Disp, (SYRINGE 3CC/31KN4-0/4") 27G X 1-1/4" 3 ML MISC Use for IM injection of ketorolac , Normal       !! - Potential duplicate medications found  Please discuss with provider  No discharge procedures on file      PDMP Review       Value Time User    PDMP Reviewed  Yes 5/18/2022 10:34 AM Ophelia Lowe MD          ED Provider  Electronically Signed by           Bob Clark PA-C  08/05/22 1914       Bob Clark PA-C  08/05/22 2017

## 2022-08-05 NOTE — TELEPHONE ENCOUNTER
Spoke with pt  She was agreeable to try diamox, hopefully to take temporarily  With increased platelets and just added an abx for UTI, these could all be triggers for migraines and increased ICP  S/e reviewed  She will contact me EOB day today if not at pharmacy

## 2022-08-07 ENCOUNTER — HOSPITAL ENCOUNTER (EMERGENCY)
Facility: HOSPITAL | Age: 32
Discharge: HOME | End: 2022-08-07
Attending: EMERGENCY MEDICINE | Admitting: EMERGENCY MEDICINE
Payer: COMMERCIAL

## 2022-08-07 VITALS
OXYGEN SATURATION: 96 % | BODY MASS INDEX: 42.88 KG/M2 | HEIGHT: 63 IN | RESPIRATION RATE: 20 BRPM | TEMPERATURE: 98.7 F | SYSTOLIC BLOOD PRESSURE: 145 MMHG | HEART RATE: 100 BPM | WEIGHT: 242 LBS | DIASTOLIC BLOOD PRESSURE: 67 MMHG

## 2022-08-07 DIAGNOSIS — G43.009 MIGRAINE WITHOUT AURA AND WITHOUT STATUS MIGRAINOSUS, NOT INTRACTABLE: Primary | ICD-10-CM

## 2022-08-07 PROCEDURE — 99284 EMERGENCY DEPT VISIT MOD MDM: CPT | Performed by: EMERGENCY MEDICINE

## 2022-08-07 PROCEDURE — 96365 THER/PROPH/DIAG IV INF INIT: CPT

## 2022-08-07 PROCEDURE — 96375 TX/PRO/DX INJ NEW DRUG ADDON: CPT

## 2022-08-07 PROCEDURE — 99283 EMERGENCY DEPT VISIT LOW MDM: CPT

## 2022-08-07 RX ORDER — ACETAMINOPHEN 325 MG/1
650 TABLET ORAL ONCE
Status: COMPLETED | OUTPATIENT
Start: 2022-08-07 | End: 2022-08-07

## 2022-08-07 RX ORDER — MAGNESIUM SULFATE HEPTAHYDRATE 40 MG/ML
2 INJECTION, SOLUTION INTRAVENOUS ONCE
Status: COMPLETED | OUTPATIENT
Start: 2022-08-07 | End: 2022-08-07

## 2022-08-07 RX ORDER — DIPHENHYDRAMINE HYDROCHLORIDE 50 MG/ML
25 INJECTION INTRAMUSCULAR; INTRAVENOUS ONCE
Status: COMPLETED | OUTPATIENT
Start: 2022-08-07 | End: 2022-08-07

## 2022-08-07 RX ORDER — KETOROLAC TROMETHAMINE 30 MG/ML
30 INJECTION, SOLUTION INTRAMUSCULAR; INTRAVENOUS ONCE
Status: COMPLETED | OUTPATIENT
Start: 2022-08-07 | End: 2022-08-07

## 2022-08-07 RX ORDER — METOCLOPRAMIDE HYDROCHLORIDE 5 MG/ML
10 INJECTION INTRAMUSCULAR; INTRAVENOUS ONCE
Status: COMPLETED | OUTPATIENT
Start: 2022-08-07 | End: 2022-08-07

## 2022-08-07 RX ORDER — LANOLIN ALCOHOL/MO/W.PET/CERES
400 CREAM (GRAM) TOPICAL DAILY
Qty: 30 TABLET | Refills: 0 | Status: SHIPPED | OUTPATIENT
Start: 2022-08-07 | End: 2022-10-28 | Stop reason: SDUPTHER

## 2022-08-07 RX ADMIN — KETOROLAC TROMETHAMINE 30 MG: 30 INJECTION, SOLUTION INTRAMUSCULAR at 15:13

## 2022-08-07 RX ADMIN — ACETAMINOPHEN 650 MG: 325 TABLET ORAL at 15:25

## 2022-08-07 RX ADMIN — DIPHENHYDRAMINE HYDROCHLORIDE 25 MG: 50 INJECTION, SOLUTION INTRAMUSCULAR; INTRAVENOUS at 15:10

## 2022-08-07 RX ADMIN — METOCLOPRAMIDE HYDROCHLORIDE 10 MG: 5 INJECTION INTRAMUSCULAR; INTRAVENOUS at 15:11

## 2022-08-07 RX ADMIN — MAGNESIUM SULFATE HEPTAHYDRATE 2 G: 40 INJECTION, SOLUTION INTRAVENOUS at 15:14

## 2022-08-07 RX ADMIN — SODIUM CHLORIDE 1000 ML: 0.9 INJECTION, SOLUTION INTRAVENOUS at 15:25

## 2022-08-07 NOTE — ED PROVIDER NOTES
History  Chief Complaint   Patient presents with    Migraine     X 3 weeks seen here on  for same headache     45-year-old female with a history of migraine headaches presents the emergency department for evaluation of headache  Patient reports history of migraine headaches over the past 5 years or so  She does follow-up with neurology  She reports an ongoing migraine headache for approximately 3 weeks  She was most recently seen here on  for a headache with a treated her with a migraine cocktail with relief and she was discharged home  She states since being home, the headache has returned and has been persistent  She describes it as throbbing and stabbing in nature  She states it feels like prior headaches although may be more severe in intensity  Is associated with photophobia, nausea, and vomiting  No blurry vision or double vision  No dizziness, neck pain or stiffness  No numbness or tingling  No recent illnesses  She was in contact with her neurologist who recently started her on Diamox  She states she has been taking without relief  The headache was not maximal in onset  No head trauma or falls  She is not on any blood thinners  Prior to Admission Medications   Prescriptions Last Dose Informant Patient Reported? Taking? ALPRAZolam (XANAX) 0 5 mg tablet  Self No No   Sig: Take 1 tablet (0 5 mg total) by mouth as needed in the morning and 1 tablet (0 5 mg total) as needed in the evening for anxiety  ARIPiprazole (ABILIFY) 2 mg tablet  Self No No   Sig: Take 1 tablet (2 mg total) by mouth daily   Aimovig 140 MG/ML SOAJ  Self No No   Sig: Inject 140mg under the skin every 30 (thirty) days  B-D 3CC LUER-DEANN SYR 25GX1" 25G X 1" 3 ML MISC  Self Yes No   Sig:     Rimegepant Sulfate (NURTEC) 75 MG TBDP   No No   Si tab at migraine onset  No more than one dose per 24 hours  Hold triptan     Syringe/Needle, Disp, (SYRINGE 3CC/55TP6-1/4") 27G X 1-1/4" 3 ML MISC  Self No No Sig: Use for IM injection of ketorolac    acetaZOLAMIDE (DIAMOX) 250 mg tablet   No No   Si tab BID x 1week, will increase further if needed  buPROPion (WELLBUTRIN XL) 300 mg 24 hr tablet  Self No No   Sig: Take 1 tablet (300 mg total) by mouth in the morning  dexamethasone (DECADRON) 2 mg tablet   No No   Si tab qam with food prn migraine  Patient not taking: No sig reported   dicyclomine (BENTYL) 20 mg tablet   No No   Sig: Take 1 tablet (20 mg total) by mouth 2 (two) times a day   diphenoxylate-atropine (LOMOTIL) 2 5-0 025 mg per tablet  Self No No   Si tablet 4 times daily as needed for diarrhea   escitalopram (LEXAPRO) 20 mg tablet  Self No No   Sig: Take 1 tablet (20 mg total) by mouth in the morning    etonogestrel-ethinyl estradiol (NuvaRing) 0 12-0 015 MG/24HR vaginal ring   No No   Sig: Insert ring for 21 days then remove for one week  indomethacin (INDOCIN) 25 mg capsule   No No   Si tab BID prn severe headache with food  Hold toradol    ketorolac (TORADOL) 10 mg tablet  Self No No   Sig: Take 1 tablet (10 mg total) by mouth every 6 (six) hours as needed (migraine) Max 2-3 per week  meclizine (ANTIVERT) 12 5 MG tablet   No No   Sig: Take 1 tablet (12 5 mg total) by mouth 3 (three) times a day as needed for dizziness   metFORMIN (GLUCOPHAGE-XR) 500 mg 24 hr tablet   No No   Sig: Take 2 tablets (1,000 mg total) by mouth daily with breakfast   metoclopramide (Reglan) 10 mg tablet   No No   Sig: Take 1 tablet (10 mg total) by mouth every 6 (six) hours   Patient not taking: No sig reported   olmesartan (BENICAR) 20 mg tablet  Self No No   Sig: Take 1 tablet (20 mg total) by mouth in the morning     onabotulinumtoxin A (BOTOX) 100 units  Self Yes No   Sig: Inject as directed   ondansetron (ZOFRAN) 4 mg tablet   No No   Sig: Take 1 tablet (4 mg total) by mouth every 6 (six) hours   pantoprazole (PROTONIX) 40 mg tablet   No No   Sig: Take 1 tablet (40 mg total) by mouth daily   prazosin (MINIPRESS) 1 mg capsule   No No   Sig: Take 1 capsule (1 mg total) by mouth daily at bedtime   rizatriptan (MAXALT-MLT) 10 mg disintegrating tablet   No No   Si tab at migraine onset, repeat after 2 hours if needed; Max 2 per day and 3 per week  sulfamethoxazole-trimethoprim (BACTRIM DS) 800-160 mg per tablet   No No   Sig: Take 1 tablet by mouth 2 (two) times a day for 5 days      Facility-Administered Medications Last Administration Doses Remaining   cyanocobalamin injection 1,000 mcg 8/3/2020  8:59 AM    cyanocobalamin injection 1,000 mcg 2020 11:46 AM    cyanocobalamin injection 1,000 mcg 2020 11:29 AM           Past Medical History:   Diagnosis Date    Abnormal Pap smear of cervix     Allergic     Anxiety     Arthritis     Depression     Diabetes mellitus ([facility] [address] )     Fibromyalgia, primary     Hypertension     IBS (irritable bowel syndrome)     Migraine     Obesity     Vitamin B12 deficiency        Past Surgical History:   Procedure Laterality Date    COLONOSCOPY N/A 2018    Procedure: COLONOSCOPY;  Surgeon: Ramesh Díaz MD;  Location: [facility] GI LAB;   Service: Gastroenterology    MA EXC SKIN BENIG >4 CM REMAINDR BODY N/A 2021    Procedure: EXCISION OF PILAR SCALP CYST X 2 AND RIGHT INNER GROIN NEVVUS;  Surgeon: Duy Stinson MD;  Location:  MAIN OR;  Service: Plastics    MA RECMPL WND SCALP,EXTR 2 6-7 5 CM N/A 2021    Procedure: CLOSURE WOUND SCALP X 2 AND RIGHT INNER GROIN;  Surgeon: Duy Stinson MD;  Location:  MAIN OR;  Service: Plastics    TONSILLECTOMY      WISDOM TOOTH EXTRACTION         Family History   Problem Relation Age of Onset    Rheum arthritis Mother     Psoriasis Mother     Other Mother     Hypertension Mother     Diabetes unspecified Mother     Sjogren's syndrome Mother     Alcohol abuse Mother     Drug abuse Mother     Anxiety disorder Father     Alcohol abuse Father     Lung cancer Maternal Grandfather     Cancer Other bone    Diabetes Other     Other Other         High blood pressure    Depression Maternal Grandmother      I have reviewed and agree with the history as documented  E-Cigarette/Vaping    E-Cigarette Use Former User     Start Date 7/1/19     Comments 1 cartridge every 2 months       E-Cigarette/Vaping Substances    Nicotine No     THC Yes     CBD Yes     Flavoring No     Other No     Unknown No      Social History     Tobacco Use    Smoking status: Never Smoker    Smokeless tobacco: Never Used   Vaping Use    Vaping Use: Former    Start date: 7/1/2019    Substances: THC, CBD   Substance Use Topics    Alcohol use: Yes     Comment: rarely    Drug use: Yes     Frequency: 2 0 times per week     Types: Marijuana     Comment: medical marijuana (vape)       Review of Systems   Constitutional: Negative for chills and fever  HENT: Negative for ear pain and sore throat  Eyes: Positive for photophobia  Negative for pain and visual disturbance  Respiratory: Negative for cough and shortness of breath  Cardiovascular: Negative for chest pain and palpitations  Gastrointestinal: Positive for nausea and vomiting  Negative for abdominal pain  Genitourinary: Negative for dysuria and hematuria  Musculoskeletal: Negative for arthralgias, back pain, neck pain and neck stiffness  Skin: Negative for color change and rash  Neurological: Positive for headaches  Negative for seizures and syncope  All other systems reviewed and are negative  Physical Exam  Physical Exam  Vitals and nursing note reviewed  Constitutional:       General: She is not in acute distress  Appearance: She is well-developed  HENT:      Head: Normocephalic and atraumatic  Right Ear: External ear normal       Left Ear: External ear normal       Nose: Nose normal       Mouth/Throat:      Mouth: Mucous membranes are moist    Eyes:      Extraocular Movements: Extraocular movements intact        Conjunctiva/sclera: Conjunctivae normal       Pupils: Pupils are equal, round, and reactive to light  Comments: No nystagmus   Cardiovascular:      Rate and Rhythm: Normal rate and regular rhythm  Pulses: Normal pulses  Heart sounds: No murmur heard  Pulmonary:      Effort: Pulmonary effort is normal  No respiratory distress  Breath sounds: Normal breath sounds  Musculoskeletal:         General: No deformity  Normal range of motion  Cervical back: Normal range of motion and neck supple  Skin:     General: Skin is warm and dry  Capillary Refill: Capillary refill takes less than 2 seconds  Neurological:      General: No focal deficit present  Mental Status: She is alert and oriented to person, place, and time  Mental status is at baseline  Cranial Nerves: No cranial nerve deficit  Sensory: No sensory deficit  Motor: No weakness        Gait: Gait normal    Psychiatric:         Mood and Affect: Mood normal          Behavior: Behavior normal          Vital Signs  ED Triage Vitals [08/07/22 1438]   Temperature Pulse Respirations Blood Pressure SpO2   98 7 °F (37 1 °C) 100 20 145/67 96 %      Temp Source Heart Rate Source Patient Position - Orthostatic VS BP Location FiO2 (%)   Tympanic Monitor Sitting Right arm --      Pain Score       6           Vitals:    08/07/22 1438   BP: 145/67   Pulse: 100   Patient Position - Orthostatic VS: Sitting         Visual Acuity      ED Medications  Medications   diphenhydrAMINE (BENADRYL) injection 25 mg (25 mg Intravenous Given 8/7/22 1510)   metoclopramide (REGLAN) injection 10 mg (10 mg Intravenous Given 8/7/22 1511)   ketorolac (TORADOL) injection 30 mg (30 mg Intravenous Given 8/7/22 1513)   magnesium sulfate 2 g/50 mL IVPB (premix) 2 g (0 g Intravenous Stopped 8/7/22 1620)   acetaminophen (TYLENOL) tablet 650 mg (650 mg Oral Given 8/7/22 1525)   sodium chloride 0 9 % bolus 1,000 mL (0 mL Intravenous Stopped 8/7/22 1620)       Diagnostic Studies  Results Reviewed     None                 No orders to display              Procedures  Procedures         ED Course  ED Course as of 08/07/22 1721   Sun Aug 07, 2022   1454 Patient states there is no chance that she is pregnant, had recent negative pregnancy test   1522 Symptoms are improving with medications   1619 IV removed by myself                                             MDM  Number of Diagnoses or Management Options  Migraine without aura and without status migrainosus, not intractable  Diagnosis management comments: 20-year-old female with a history of migraine headaches presents the ED for evaluation of headache that feels similar to prior headaches  On exam she is well appearing no acute distress  No focal neurologic deficits present  Will treat symptomatically and reassess  If symptoms improved, plan to discharge home with close Neurology follow-up  - Patient is suffering from a headache  It sounds like benign headache /typical migraine   - The headache is not only when the patient wakes up  - The patient has been having a headache that is gradual in onset, no fevers, no neck stiffness  The patient is an alert patient, older than age 14 years with severe non-traumatic headache reaching maximum intensity over greater than an hour  The patient has no new neurologic deficits, previous aneurysms, SAH, brain tumors  The patient has a history of recurrent headache syndromes that feels like this  - Further, there are no high-risk variables including neck pain/stiffness, witnessed LOC, onset during exertion, thunderclap headache quality (instantly peaking pain), nor is there limited neck flexion on examination    - Clinically, doesn't sound like a secondary headache  - Will trial a migraine cocktail:   Pain:        (x) Tylenol 975mg PO        (x) Toradol 15mg IV   Antiemetic: will do IV as there might be stasis of gastric contents           (x) Reglan 10mg IV        (  ) Phenergan        ( ) Zofran 4mg IV                   (  ) Compazine   IVF: as mild dehydration decreases pain thresholds and increases central pain-related activity in the anterior cingulate cortex, insula, and thalamus and is a known trigger of migraines  (x) Normal Saline        (  ) Lactated Ringers        (  ) Plasmalyte   Other:        (x) Benadryl 50mg IV        (  ) Versed 3mg IV        (  ) Decadron 10mg IV        (  ) Solumedrol 500mg IV         (  ) Magnesium 2g IV over 30-60 minutes        (  ) Depacon / Depakote 500mg IV over 60 minutes        (  ) Propofol        (  ) Ketamine 1mg/kg IV        (  ) Olanzapine 10mg PO sublingual        (  ) Asenapine 10mg PO SL        (  ) Haldol 5mg IM  - Will likely DC home with preventive medications:        (x) Magnesium oxide 400mg PO Qday x30 days        (x) Riboflavin 400mg PO Qday x30 days  - Return precautions reviewed orally for concerning red flags  - Patient to follow-up with PCP or return for worsening    - 13 point ROS was performed and all are normal unless stated in the history above  - Nursing note reviewed  Vitals reviewed  - Orders placed by myself and/or advanced practitioner / resident     - Previous chart was reviewed  - No language barrier    - History obtained from patient  - There are no limitations to the history obtained  - Critical care time: Not applicable for this patient         Disposition  Final diagnoses:   Migraine without aura and without status migrainosus, not intractable     Time reflects when diagnosis was documented in both MDM as applicable and the Disposition within this note     Time User Action Codes Description Comment    8/7/2022  3:31 PM Check, Federico Fillers Add [G43 009] Migraine without aura and without status migrainosus, not intractable     8/7/2022  3:33 PM Check, Federico Fillers Modify [G43 009] Migraine without aura and without status migrainosus, not intractable       ED Disposition     ED Disposition   Discharge    Condition   Stable Date/Time   Sun Aug 7, 2022  4:13 PM    Comment   Matthew Guillaume discharge to home/self care  Follow-up Information     Follow up With Specialties Details Why [address] Emergency Department Emergency Medicine  If symptoms worsen [address] [place]par[address] [id number] [phone] [facility] Emergency Department, [address] [facility], [address] [facility] Neurology Schedule an appointment as soon as possible for a visit   [address] [facility]  [id number]  [address] [facility] Neurology [address] [facility] WITH [place], [place], [address]          Discharge Medication List as of 8/7/2022  4:13 PM      START taking these medications    Details   magnesium Oxide (MAG-OX) 400 mg TABS Take 1 tablet (400 mg total) by mouth daily, Starting Sun 8/7/2022, Until Tue 9/6/2022, Normal      Riboflavin 400 MG CAPS Take 1 capsule (400 mg total) by mouth in the morning, Starting Sun 8/7/2022, Until Tue 9/6/2022, Normal         CONTINUE these medications which have NOT CHANGED    Details   acetaZOLAMIDE (DIAMOX) 250 mg tablet 1 tab BID x 1week, will increase further if needed , Normal      Aimovig 140 MG/ML SOAJ Inject 140mg under the skin every 30 (thirty) days  , Normal      ALPRAZolam (XANAX) 0 5 mg tablet Take 1 tablet (0 5 mg total) by mouth as needed in the morning and 1 tablet (0 5 mg total) as needed in the evening for anxiety  , Starting Wed 5/18/2022, Normal      ARIPiprazole (ABILIFY) 2 mg tablet Take 1 tablet (2 mg total) by mouth daily, Starting Tue 5/10/2022, Normal      !! B-D 3CC LUER-DEANN SYR 25GX1" 25G X 1" 3 ML MISC  , Starting Thu 7/26/2018, Historical Med      buPROPion (WELLBUTRIN XL) 300 mg 24 hr tablet Take 1 tablet (300 mg total) by mouth in the morning , Starting Wed 5/18/2022, Normal      dexamethasone (DECADRON) 2 mg tablet 1 tab qam with food prn migraine , Normal      dicyclomine (BENTYL) 20 mg tablet Take 1 tablet (20 mg total) by mouth 2 (two) times a day, Starting Fri 5/27/2022, Normal      diphenoxylate-atropine (LOMOTIL) 2 5-0 025 mg per tablet 1 tablet 4 times daily as needed for diarrhea, Normal      escitalopram (LEXAPRO) 20 mg tablet Take 1 tablet (20 mg total) by mouth in the morning , Starting Wed 5/18/2022, Normal      etonogestrel-ethinyl estradiol (NuvaRing) 0 12-0 015 MG/24HR vaginal ring Insert ring for 21 days then remove for one week , Normal      indomethacin (INDOCIN) 25 mg capsule 1 tab BID prn severe headache with food  Hold toradol , Normal      ketorolac (TORADOL) 10 mg tablet Take 1 tablet (10 mg total) by mouth every 6 (six) hours as needed (migraine) Max 2-3 per week , Starting Tue 3/22/2022, Normal      meclizine (ANTIVERT) 12 5 MG tablet Take 1 tablet (12 5 mg total) by mouth 3 (three) times a day as needed for dizziness, Starting Wed 8/3/2022, Normal      metFORMIN (GLUCOPHAGE-XR) 500 mg 24 hr tablet Take 2 tablets (1,000 mg total) by mouth daily with breakfast, Starting Tue 5/10/2022, Normal      metoclopramide (Reglan) 10 mg tablet Take 1 tablet (10 mg total) by mouth every 6 (six) hours, Starting Sat 5/21/2022, Normal      olmesartan (BENICAR) 20 mg tablet Take 1 tablet (20 mg total) by mouth in the morning , Starting Mon 5/23/2022, Normal      onabotulinumtoxin A (BOTOX) 100 units Inject as directed, Starting Tue 8/29/2017, Historical Med      ondansetron (ZOFRAN) 4 mg tablet Take 1 tablet (4 mg total) by mouth every 6 (six) hours, Starting Fri 5/27/2022, Normal      pantoprazole (PROTONIX) 40 mg tablet Take 1 tablet (40 mg total) by mouth daily, Starting Fri 5/27/2022, Normal      prazosin (MINIPRESS) 1 mg capsule Take 1 capsule (1 mg total) by mouth daily at bedtime, Starting Fri 7/29/2022, Normal      Rimegepant Sulfate (NURTEC) 75 MG TBDP 1 tab at migraine onset   No more than one dose per 24 hours  Hold triptan , Normal      rizatriptan (MAXALT-MLT) 10 mg disintegrating tablet 1 tab at migraine onset, repeat after 2 hours if needed; Max 2 per day and 3 per week , Normal      sulfamethoxazole-trimethoprim (BACTRIM DS) 800-160 mg per tablet Take 1 tablet by mouth 2 (two) times a day for 5 days, Starting Wed 8/3/2022, Until Mon 8/8/2022, Normal      !! Syringe/Needle, Disp, (SYRINGE 3CC/35FA0-9/4") 27G X 1-1/4" 3 ML MISC Use for IM injection of ketorolac , Normal       !! - Potential duplicate medications found  Please discuss with provider  No discharge procedures on file      PDMP Review       Value Time User    PDMP Reviewed  Yes 5/18/2022 10:34 AM Maddi Griffin MD          ED Provider  Electronically Signed by           Leroy Tee MD  08/07/22 4211

## 2022-08-07 NOTE — DISCHARGE INSTRUCTIONS
Magnesium oxide 400mg PO Qday x30 days  Riboflavin 400mg PO Qday x30 days    Recommend Tylenol and ibuprofen every 6 hours as needed for headache    Follow-up with neurology

## 2022-08-08 NOTE — TELEPHONE ENCOUNTER
Patient aware  She has said in fact she did try rizatriptan in the past prescribed by her primary care physician in year 2016 to 2017  I called insurance and provided update  They will resubmit to pharmacy for review  Determination  pending

## 2022-08-09 ENCOUNTER — TELEPHONE (OUTPATIENT)
Dept: PSYCHIATRY | Facility: CLINIC | Age: 32
End: 2022-08-09

## 2022-08-09 DIAGNOSIS — M79.7 FIBROMYALGIA: ICD-10-CM

## 2022-08-09 RX ORDER — ARIPIPRAZOLE 2 MG/1
2 TABLET ORAL DAILY
Qty: 30 TABLET | Refills: 2 | Status: SHIPPED | OUTPATIENT
Start: 2022-08-09 | End: 2022-09-22

## 2022-08-09 NOTE — TELEPHONE ENCOUNTER
Katerin Burgos from [facility] called and lm on nursing line   He stated Agustin Abarca needs a refill of her Aripiprazole    [phone]

## 2022-08-10 ENCOUNTER — TELEPHONE (OUTPATIENT)
Dept: NEUROLOGY | Facility: CLINIC | Age: 32
End: 2022-08-10

## 2022-08-10 DIAGNOSIS — G43.709 CHRONIC MIGRAINE WITHOUT AURA WITHOUT STATUS MIGRAINOSUS, NOT INTRACTABLE: ICD-10-CM

## 2022-08-10 NOTE — TELEPHONE ENCOUNTER
Patient left voicemail that she is waiting for approval on Nurtec  Also said she has had migraines for 3 weeks  Requesting call back [phone]    Patient of Lori Pabon ,  last visit August 3rd    I called patient to advise, consent on file, that we will work on approval for [employer]; also requested a call back to discuss migraines if needed  After reviewing her chart I saw [employer] was approved from August 8, 2022 through August 8, 2023 however the prescription was sent to [facility] pharmacy who does not accept Medicaid  I called patient back and let her know that Nurtec was approved and we will redirect the script to [facility]'s pharmacy  Giovanna Kennedy please sign script to redirect her back does if in agreement thank you

## 2022-08-11 NOTE — TELEPHONE ENCOUNTER
Patient left voicemail August 10th requesting call back [phone]  She is reporting a headache currently  6- 7/10 x3 weeks despite taking Diamox 250 mg twice a day x6 days, magnesium 400 mg daily, indomethacin 1 daily, Lexapro 20 mg daily, and Aimovig  She said she does not feel her headaches are any better nor worse since starting Diamox  Her Nurtec was just approved and she will  at the pharmacy today  She said in the past Decadron and cocktail received in the emergency room had helped however recent course of Decadron did not provide relief  Please provide recommendations thank you

## 2022-08-12 ENCOUNTER — OFFICE VISIT (OUTPATIENT)
Dept: URGENT CARE | Facility: CLINIC | Age: 32
End: 2022-08-12
Payer: COMMERCIAL

## 2022-08-12 VITALS
WEIGHT: 237 LBS | TEMPERATURE: 98.2 F | BODY MASS INDEX: 41.99 KG/M2 | HEIGHT: 63 IN | RESPIRATION RATE: 16 BRPM | OXYGEN SATURATION: 97 % | SYSTOLIC BLOOD PRESSURE: 128 MMHG | HEART RATE: 110 BPM | DIASTOLIC BLOOD PRESSURE: 84 MMHG

## 2022-08-12 DIAGNOSIS — R21 RASH: Primary | ICD-10-CM

## 2022-08-12 DIAGNOSIS — G43.119 INTRACTABLE MIGRAINE WITH AURA WITHOUT STATUS MIGRAINOSUS: ICD-10-CM

## 2022-08-12 PROCEDURE — 99213 OFFICE O/P EST LOW 20 MIN: CPT

## 2022-08-12 NOTE — PROGRESS NOTES
[facility]    NAME: Kalen Espinoza is a 33 y o  female  : 1990    MRN: [id number]  DATE: 2022  TIME: 3:59 PM    Assessment and Plan   Rash [R21]  1  Rash     2  Intractable migraine with aura without status migrainosus       No rashes noted on examination  No pictures provided by the patient of any skin eruptions  Did advice the patient to continue with the abortive plan that was discussed with Neurology, patient did not voice any complaints after turning the lights on in the room  Provided patient with precautionary measure    Patient Instructions   There are no Patient Instructions on file for this visit  Follow up with PCP in 3-5 days  Proceed to ER if symptoms worsen  Chief Complaint     Chief Complaint   Patient presents with    Rash     Rash on her face started when she took zyrtec yesterday   Migraine     Migraine started 3 weeks ago  History of Present Illness   HPI   76-year-old female with multiple medical illnesses who presented for evaluation of a possible rash  Patient noted that she started a new medication yesterday, Nurtec  She tried the medication today around 5 in the morning and started feeling pumpy at the base of her chin as well as glabella area  She does not feels the sensation anywhere else in her body  She reports mild itching but no other symptoms  She denied any rashes on the face or any other part of the body  She denied any other symptoms  She is also complaining of uncontrolled migraine  Review of Systems   Review of Systems   All other systems reviewed and are negative  The following portions of the patient's history were reviewed and updated as appropriate: allergies, current medications, past family history, past medical history, past social history, past surgical history and problem list      Medications have been verified    Objective   /84   Pulse (!) 110   Temp 98 2 °F (36 8 °C)   Resp 16   Ht 5' 3" (1 6 m)   Wt 108 kg (237 lb)   SpO2 97%   BMI 41 98 kg/m²     Physical Exam  Vitals reviewed  Constitutional:       General: She is not in acute distress  Appearance: She is well-developed  She is not diaphoretic  HENT:      Head: Normocephalic and atraumatic  Nose: Nose normal    Eyes:      Conjunctiva/sclera: Conjunctivae normal       Pupils: Pupils are equal, round, and reactive to light  Cardiovascular:      Rate and Rhythm: Normal rate and regular rhythm  Heart sounds: Normal heart sounds  No murmur heard  No friction rub  No gallop  Pulmonary:      Effort: Pulmonary effort is normal  No respiratory distress  Breath sounds: Normal breath sounds  No wheezing or rales  Abdominal:      General: Bowel sounds are normal  There is no distension  Palpations: Abdomen is soft  Tenderness: There is no abdominal tenderness  Musculoskeletal:         General: Normal range of motion  Cervical back: Normal range of motion and neck supple  Skin:     General: Skin is warm and dry  Findings: No erythema or rash  Comments: Noted normal skin tone consisting of mainly freckles with no rashes  No swelling, erythema, discharge or fluctuations   Neurological:      Mental Status: She is alert and oriented to person, place, and time         Reggie Padilla MD

## 2022-08-12 NOTE — TELEPHONE ENCOUNTER
Patient left voicemail and advised that Nurtec did not work to help relieve her migraine which she has had for 3 weeks requested call back [phone]  I returned call and we discussed recommendation to increase Diamox per this communication thread  She is receptive to same and will try over the weekend and advise next week if effective  If so most likely will need to send another script with the updated dose

## 2022-08-15 ENCOUNTER — PATIENT MESSAGE (OUTPATIENT)
Dept: NEUROLOGY | Facility: CLINIC | Age: 32
End: 2022-08-15

## 2022-08-15 DIAGNOSIS — G43.709 CHRONIC MIGRAINE WITHOUT AURA WITHOUT STATUS MIGRAINOSUS, NOT INTRACTABLE: Primary | ICD-10-CM

## 2022-08-15 DIAGNOSIS — G43.009 MIGRAINE WITHOUT AURA AND WITHOUT STATUS MIGRAINOSUS, NOT INTRACTABLE: Primary | ICD-10-CM

## 2022-08-15 DIAGNOSIS — E66.01 MORBID OBESITY WITH BMI OF 40.0-44.9, ADULT (HCC): ICD-10-CM

## 2022-08-15 DIAGNOSIS — I15.9 SECONDARY HYPERTENSION: ICD-10-CM

## 2022-08-16 ENCOUNTER — HOSPITAL ENCOUNTER (EMERGENCY)
Facility: HOSPITAL | Age: 32
Discharge: HOME | End: 2022-08-16
Attending: EMERGENCY MEDICINE
Payer: COMMERCIAL

## 2022-08-16 ENCOUNTER — OFFICE VISIT (OUTPATIENT)
Dept: DENTISTRY | Facility: CLINIC | Age: 32
End: 2022-08-16

## 2022-08-16 VITALS
RESPIRATION RATE: 16 BRPM | HEART RATE: 101 BPM | OXYGEN SATURATION: 98 % | SYSTOLIC BLOOD PRESSURE: 142 MMHG | TEMPERATURE: 98.9 F | BODY MASS INDEX: 41.63 KG/M2 | WEIGHT: 235 LBS | DIASTOLIC BLOOD PRESSURE: 86 MMHG

## 2022-08-16 VITALS — TEMPERATURE: 97.8 F | DIASTOLIC BLOOD PRESSURE: 74 MMHG | SYSTOLIC BLOOD PRESSURE: 120 MMHG

## 2022-08-16 DIAGNOSIS — G43.909 MIGRAINE: Primary | ICD-10-CM

## 2022-08-16 DIAGNOSIS — Z01.20 ENCOUNTER FOR DENTAL EXAMINATION: Primary | ICD-10-CM

## 2022-08-16 LAB
EXT PREG TEST URINE: NEGATIVE
EXT. CONTROL ED NAV: NORMAL

## 2022-08-16 PROCEDURE — D0230 INTRAORAL - PERIAPICAL EACH ADDITIONAL RADIOGRAPHIC IMAGE: HCPCS | Performed by: DENTIST

## 2022-08-16 PROCEDURE — 96366 THER/PROPH/DIAG IV INF ADDON: CPT

## 2022-08-16 PROCEDURE — 96365 THER/PROPH/DIAG IV INF INIT: CPT

## 2022-08-16 PROCEDURE — 81025 URINE PREGNANCY TEST: CPT | Performed by: EMERGENCY MEDICINE

## 2022-08-16 PROCEDURE — 96375 TX/PRO/DX INJ NEW DRUG ADDON: CPT

## 2022-08-16 PROCEDURE — 99284 EMERGENCY DEPT VISIT MOD MDM: CPT | Performed by: EMERGENCY MEDICINE

## 2022-08-16 PROCEDURE — D0150 COMPREHENSIVE ORAL EVALUATION - NEW OR ESTABLISHED PATIENT: HCPCS | Performed by: DENTIST

## 2022-08-16 PROCEDURE — D0274 BITEWINGS - 4 RADIOGRAPHIC IMAGES: HCPCS | Performed by: DENTIST

## 2022-08-16 PROCEDURE — 99283 EMERGENCY DEPT VISIT LOW MDM: CPT

## 2022-08-16 RX ORDER — METOCLOPRAMIDE HYDROCHLORIDE 5 MG/ML
10 INJECTION INTRAMUSCULAR; INTRAVENOUS ONCE
Status: COMPLETED | OUTPATIENT
Start: 2022-08-16 | End: 2022-08-16

## 2022-08-16 RX ORDER — MAGNESIUM SULFATE HEPTAHYDRATE 40 MG/ML
2 INJECTION, SOLUTION INTRAVENOUS ONCE
Status: COMPLETED | OUTPATIENT
Start: 2022-08-16 | End: 2022-08-16

## 2022-08-16 RX ORDER — KETOROLAC TROMETHAMINE 30 MG/ML
30 INJECTION, SOLUTION INTRAMUSCULAR; INTRAVENOUS ONCE
Status: COMPLETED | OUTPATIENT
Start: 2022-08-16 | End: 2022-08-16

## 2022-08-16 RX ORDER — DIPHENHYDRAMINE HYDROCHLORIDE 50 MG/ML
25 INJECTION INTRAMUSCULAR; INTRAVENOUS ONCE
Status: COMPLETED | OUTPATIENT
Start: 2022-08-16 | End: 2022-08-16

## 2022-08-16 RX ADMIN — SODIUM CHLORIDE 1000 ML: 0.9 INJECTION, SOLUTION INTRAVENOUS at 17:07

## 2022-08-16 RX ADMIN — MAGNESIUM SULFATE HEPTAHYDRATE 2 G: 40 INJECTION, SOLUTION INTRAVENOUS at 17:13

## 2022-08-16 RX ADMIN — DIPHENHYDRAMINE HYDROCHLORIDE 25 MG: 50 INJECTION, SOLUTION INTRAMUSCULAR; INTRAVENOUS at 17:12

## 2022-08-16 RX ADMIN — KETOROLAC TROMETHAMINE 30 MG: 30 INJECTION, SOLUTION INTRAMUSCULAR at 17:12

## 2022-08-16 RX ADMIN — METOCLOPRAMIDE HYDROCHLORIDE 10 MG: 5 INJECTION INTRAMUSCULAR; INTRAVENOUS at 17:06

## 2022-08-16 NOTE — PROGRESS NOTES
Patient presents on dental Nilesh Dieter with signed consent  for new patient exam  Medical history- no changes reported ASA 2  Patient denies any constitutional symptoms  Concerned with cavity front teeth  Chief complaint: Here for a check up  Pain scale 0 out of 10- no pain reported       Extraoral exam: WNL, no lymphadenopathy, TMJ- clicking, some pain on opening  Intraoral exam: soft tissue WNL  Dentition  Clinical caries noted on 8,9,24,25,15  Plaque - moderate generalized accumulation, calculus noted- slight  Diet: discussed acid and decalcified areas    Radiographs:  4 BW taken , 3 PAX upper anteriors     NV: adult pro, fillings

## 2022-08-17 ENCOUNTER — TELEPHONE (OUTPATIENT)
Dept: PSYCHIATRY | Facility: CLINIC | Age: 32
End: 2022-08-17

## 2022-08-17 DIAGNOSIS — G43.711 INTRACTABLE CHRONIC MIGRAINE WITHOUT AURA AND WITH STATUS MIGRAINOSUS: ICD-10-CM

## 2022-08-17 DIAGNOSIS — F41.1 GENERALIZED ANXIETY DISORDER: Chronic | ICD-10-CM

## 2022-08-17 DIAGNOSIS — G43.709 CHRONIC MIGRAINE WITHOUT AURA WITHOUT STATUS MIGRAINOSUS, NOT INTRACTABLE: Primary | ICD-10-CM

## 2022-08-17 RX ORDER — BUPROPION HYDROCHLORIDE 300 MG/1
300 TABLET ORAL DAILY
Qty: 90 TABLET | Refills: 0 | Status: SHIPPED | OUTPATIENT
Start: 2022-08-17 | End: 2022-09-22 | Stop reason: SDUPTHER

## 2022-08-17 RX ORDER — OLANZAPINE 2.5 MG/1
TABLET ORAL
Qty: 10 TABLET | Refills: 0 | Status: SHIPPED | OUTPATIENT
Start: 2022-08-17 | End: 2022-09-06 | Stop reason: SDUPTHER

## 2022-08-17 RX ORDER — CELECOXIB 100 MG/1
100 CAPSULE ORAL 2 TIMES DAILY
Qty: 20 CAPSULE | Refills: 0 | Status: SHIPPED | OUTPATIENT
Start: 2022-08-17 | End: 2022-09-30 | Stop reason: SDUPTHER

## 2022-08-17 RX ORDER — BUPROPION HYDROCHLORIDE 300 MG/1
300 TABLET ORAL DAILY
Qty: 30 TABLET | Refills: 2 | Status: SHIPPED | OUTPATIENT
Start: 2022-08-17 | End: 2022-08-17 | Stop reason: SDUPTHER

## 2022-08-17 NOTE — TELEPHONE ENCOUNTER
Ed from [facility] called and lm  He stated per [facility] they prefer a 90 day supply of the Bupropion  mg be dispensed  Ed is asking if ok to change to a 90 day supply with no refills           Ed- [phone]

## 2022-08-18 ENCOUNTER — TELEPHONE (OUTPATIENT)
Dept: NEUROLOGY | Facility: CLINIC | Age: 32
End: 2022-08-18

## 2022-08-18 NOTE — TELEPHONE ENCOUNTER
Received voicemail from [facility] advising olanzapine needs a prior authorization  I called pharmacy to clarify but was unable to get through    I called patient who advise she was able to  the medication and there were no problems with same

## 2022-08-18 NOTE — ED PROVIDER NOTES
History  Chief Complaint   Patient presents with    Migraine     Intermittent for about a month no improvement with medications Worse with light and noise  Patient reports pain is generally on the right side       Patient is a 60-year-old female with history of chronic migraines that presents with a headache  Patient says that she has been dealing with this headache for approximately 1 month is been seen several times for  She says that is been essentially constant, intermittently worsening/improving randomly  No alleviating exacerbating factors  Does endorse mild photophobia  No recent head strike, altered mental status, focal logic deficit noted  No blood thinners or antiplatelet agents noted  She has been taking acetazolamide for the headaches without much relief  She describes left-sided sharp/stabbing radiating headache to her left occiput but  Typical migraine headache  Prior to Admission Medications   Prescriptions Last Dose Informant Patient Reported? Taking? ALPRAZolam (XANAX) 0 5 mg tablet  Self No No   Sig: Take 1 tablet (0 5 mg total) by mouth as needed in the morning and 1 tablet (0 5 mg total) as needed in the evening for anxiety  ARIPiprazole (ABILIFY) 2 mg tablet   No No   Sig: Take 1 tablet (2 mg total) by mouth daily   Aimovig 140 MG/ML SOAJ  Self No No   Sig: Inject 140mg under the skin every 30 (thirty) days  B-D 3CC LUER-DEANN SYR 25GX1" 25G X 1" 3 ML MISC  Self Yes No   Sig:     Riboflavin 400 MG CAPS   No No   Sig: Take 1 capsule (400 mg total) by mouth in the morning   Rimegepant Sulfate (NURTEC) 75 MG TBDP   No No   Si tab at migraine onset  No more than one dose per 24 hours  Hold triptan  Syringe/Needle, Disp, (SYRINGE 3CC/58ZM5-9/4") 27G X 1-1/4" 3 ML MISC  Self No No   Sig: Use for IM injection of ketorolac    acetaZOLAMIDE (DIAMOX) 250 mg tablet   No No   Si tab BID x 1week, will increase further if needed     dexamethasone (DECADRON) 2 mg tablet   No No Si tab qam with food prn migraine  Patient not taking: No sig reported   dicyclomine (BENTYL) 20 mg tablet   No No   Sig: Take 1 tablet (20 mg total) by mouth 2 (two) times a day   diphenoxylate-atropine (LOMOTIL) 2 5-0 025 mg per tablet  Self No No   Si tablet 4 times daily as needed for diarrhea   escitalopram (LEXAPRO) 20 mg tablet  Self No No   Sig: Take 1 tablet (20 mg total) by mouth in the morning    etonogestrel-ethinyl estradiol (NuvaRing) 0 12-0 015 MG/24HR vaginal ring   No No   Sig: Insert ring for 21 days then remove for one week  indomethacin (INDOCIN) 25 mg capsule   No No   Si tab BID prn severe headache with food  Hold toradol    ketorolac (TORADOL) 10 mg tablet  Self No No   Sig: Take 1 tablet (10 mg total) by mouth every 6 (six) hours as needed (migraine) Max 2-3 per week    magnesium Oxide (MAG-OX) 400 mg TABS   No No   Sig: Take 1 tablet (400 mg total) by mouth daily   meclizine (ANTIVERT) 12 5 MG tablet   No No   Sig: Take 1 tablet (12 5 mg total) by mouth 3 (three) times a day as needed for dizziness   metFORMIN (GLUCOPHAGE-XR) 500 mg 24 hr tablet   No No   Sig: Take 2 tablets (1,000 mg total) by mouth daily with breakfast   metoclopramide (Reglan) 10 mg tablet   No No   Sig: Take 1 tablet (10 mg total) by mouth every 6 (six) hours   Patient not taking: No sig reported   olmesartan (BENICAR) 20 mg tablet  Self No No   Sig: Take 1 tablet (20 mg total) by mouth in the morning     onabotulinumtoxin A (BOTOX) 100 units  Self Yes No   Sig: Inject as directed   ondansetron (ZOFRAN) 4 mg tablet   No No   Sig: Take 1 tablet (4 mg total) by mouth every 6 (six) hours   pantoprazole (PROTONIX) 40 mg tablet   No No   Sig: Take 1 tablet (40 mg total) by mouth daily   prazosin (MINIPRESS) 1 mg capsule   No No   Sig: Take 1 capsule (1 mg total) by mouth daily at bedtime      Facility-Administered Medications Last Administration Doses Remaining   cyanocobalamin injection 1,000 mcg 8/3/2020 8:59 AM    cyanocobalamin injection 1,000 mcg 5/18/2020 11:46 AM    cyanocobalamin injection 1,000 mcg 9/1/2020 11:29 AM           Past Medical History:   Diagnosis Date    Abnormal Pap smear of cervix     Allergic     Anxiety     Arthritis     Depression     Diabetes mellitus (HCC)     Fibromyalgia, primary     Hypertension     IBS (irritable bowel syndrome)     Migraine     Obesity     Vitamin B12 deficiency        Past Surgical History:   Procedure Laterality Date    COLONOSCOPY N/A 5/8/2018    Procedure: COLONOSCOPY;  Surgeon: Juliette Fong MD;  Location: [facility] GI [facility]; Service: Gastroenterology    IA EXC SKIN BENIG >4 CM REMAINDR BODY N/A 7/1/2021    Procedure: EXCISION OF PILAR SCALP CYST X 2 AND RIGHT INNER GROIN NEVVUS;  Surgeon: Aquilino Encinas MD;  Location:  MAIN OR;  Service: Plastics    IA RECMPL WND SCALP,EXTR 2 6-7 5 CM N/A 7/1/2021    Procedure: CLOSURE WOUND SCALP X 2 AND RIGHT INNER GROIN;  Surgeon: Aquilino Encinas MD;  Location:  MAIN OR;  Service: Plastics    TONSILLECTOMY      WISDOM TOOTH EXTRACTION         Family History   Problem Relation Age of Onset    Rheum arthritis Mother     Psoriasis Mother     Other Mother     Hypertension Mother     Diabetes unspecified Mother     Sjogren's syndrome Mother     Alcohol abuse Mother     Drug abuse Mother     Anxiety disorder Father     Alcohol abuse Father     Lung cancer Maternal Grandfather     Cancer Other         bone    Diabetes Other     Other Other         High blood pressure    Depression Maternal Grandmother      I have reviewed and agree with the history as documented      E-Cigarette/Vaping    E-Cigarette Use Current Some Day User     Start Date 7/1/19     Comments 1 cartridge every 2 months       E-Cigarette/Vaping Substances    Nicotine No     THC Yes     CBD Yes     Flavoring No     Other No     Unknown No      Social History     Tobacco Use    Smoking status: Never Smoker    Smokeless tobacco: Never Used   Vaping Use    Vaping Use: Some days    Start date: 7/1/2019    Substances: THC, CBD   Substance Use Topics    Alcohol use: Yes     Comment: rarely    Drug use: Yes     Frequency: 2 0 times per week     Types: Marijuana     Comment: medical marijuana (vape)       Review of Systems   Constitutional: Negative for fever  HENT: Negative for sore throat  Respiratory: Negative for shortness of breath  Cardiovascular: Negative for chest pain  Gastrointestinal: Negative for abdominal pain  Genitourinary: Negative for dysuria  Musculoskeletal: Negative for back pain  Skin: Negative for rash  Neurological: Positive for headaches  Negative for light-headedness  Psychiatric/Behavioral: Negative for agitation  All other systems reviewed and are negative  Physical Exam  Physical Exam  Vitals reviewed  Constitutional:       General: She is not in acute distress  Appearance: She is well-developed  HENT:      Head: Normocephalic  Eyes:      Pupils: Pupils are equal, round, and reactive to light  Cardiovascular:      Rate and Rhythm: Normal rate and regular rhythm  Heart sounds: Normal heart sounds  Pulmonary:      Effort: Pulmonary effort is normal       Breath sounds: Normal breath sounds  Abdominal:      General: Bowel sounds are normal  There is no distension  Palpations: Abdomen is soft  Tenderness: There is no abdominal tenderness  There is no guarding  Musculoskeletal:         General: No tenderness or deformity  Normal range of motion  Cervical back: Normal range of motion and neck supple  Skin:     General: Skin is warm and dry  Capillary Refill: Capillary refill takes less than 2 seconds  Neurological:      Mental Status: She is alert and oriented to person, place, and time  Cranial Nerves: No cranial nerve deficit  Sensory: No sensory deficit  Comments: Alert oriented x3  GCS 15  Cranial nerves 2-12 intact  Strength 5/5 in all 4 extremities  Sensation intact throughout  Psychiatric:         Behavior: Behavior normal          Thought Content: Thought content normal          Judgment: Judgment normal          Vital Signs  ED Triage Vitals [08/16/22 1637]   Temperature Pulse Respirations Blood Pressure SpO2   98 9 °F (37 2 °C) 101 16 142/86 98 %      Temp Source Heart Rate Source Patient Position - Orthostatic VS BP Location FiO2 (%)   Tympanic Monitor Sitting Right arm --      Pain Score       7           Vitals:    08/16/22 1637   BP: 142/86   Pulse: 101   Patient Position - Orthostatic VS: Sitting         Visual Acuity      ED Medications  Medications   sodium chloride 0 9 % bolus 1,000 mL (0 mL Intravenous Stopped 8/16/22 1926)   diphenhydrAMINE (BENADRYL) injection 25 mg (25 mg Intravenous Given 8/16/22 1712)   metoclopramide (REGLAN) injection 10 mg (10 mg Intravenous Given 8/16/22 1706)   ketorolac (TORADOL) injection 30 mg (30 mg Intravenous Given 8/16/22 1712)   magnesium sulfate 2 g/50 mL IVPB (premix) 2 g (0 g Intravenous Stopped 8/16/22 1926)       Diagnostic Studies  Results Reviewed     Procedure Component Value Units Date/Time    POCT pregnancy, urine [[id number]]  (Normal) Resulted: 08/16/22 1702    Lab Status: Final result Updated: 08/16/22 1703     EXT PREG TEST UR (Ref: Negative) negative     Control valid ([id number] exp:2023/10/31                 No orders to display              Procedures  Procedures         ED Course                                             MDM  Number of Diagnoses or Management Options  Migraine  Diagnosis management comments: Patient is a 36-year-old female presents for evaluation of migraine  No red flags on history physical exam   Treated with migraine cocktail with significant improvement, patient reassessed and awake/alert and stable for discharge        Disposition  Final diagnoses:   Migraine     Time reflects when diagnosis was documented in both MDM as applicable and the Disposition within this note     Time User Action Codes Description Comment    8/16/2022  6:20 PM Jennifer Byrne Add [[id number] [id number]] Migraine       ED Disposition     ED Disposition   Discharge    Condition   Stable    Date/Time   Tue Aug 16, 2022  6:16 PM    Comment   Shanae Alexander discharge to home/self care  Follow-up Information     Follow up With Specialties Details Why [address] Emergency Department Emergency Medicine  If symptoms worsen [address] Amparo Wahl [id number] [phone] [facility], [address] [place] [address], [place], [address] [facility]          Discharge Medication List as of 8/16/2022  6:21 PM      CONTINUE these medications which have NOT CHANGED    Details   acetaZOLAMIDE (DIAMOX) 250 mg tablet 1 tab BID x 1week, will increase further if needed , Normal      Aimovig 140 MG/ML SOAJ Inject 140mg under the skin every 30 (thirty) days  , Normal      ALPRAZolam (XANAX) 0 5 mg tablet Take 1 tablet (0 5 mg total) by mouth as needed in the morning and 1 tablet (0 5 mg total) as needed in the evening for anxiety  , Starting Wed 5/18/2022, Normal      ARIPiprazole (ABILIFY) 2 mg tablet Take 1 tablet (2 mg total) by mouth daily, Starting Tue 8/9/2022, Normal      !! B-D 3CC LUER-DEANN SYR 25GX1" 25G X 1" 3 ML MISC  , Starting Thu 7/26/2018, Historical Med      dexamethasone (DECADRON) 2 mg tablet 1 tab qam with food prn migraine , Normal      dicyclomine (BENTYL) 20 mg tablet Take 1 tablet (20 mg total) by mouth 2 (two) times a day, Starting Fri 5/27/2022, Normal      diphenoxylate-atropine (LOMOTIL) 2 5-0 025 mg per tablet 1 tablet 4 times daily as needed for diarrhea, Normal      escitalopram (LEXAPRO) 20 mg tablet Take 1 tablet (20 mg total) by mouth in the morning , Starting Wed 5/18/2022, Normal      etonogestrel-ethinyl estradiol (NuvaRing) 0 12-0 015 MG/24HR vaginal ring Insert ring for 21 days then remove for one week , Normal      indomethacin (INDOCIN) 25 mg capsule 1 tab BID prn severe headache with food  Hold toradol , Normal      ketorolac (TORADOL) 10 mg tablet Take 1 tablet (10 mg total) by mouth every 6 (six) hours as needed (migraine) Max 2-3 per week , Starting Tue 3/22/2022, Normal      magnesium Oxide (MAG-OX) 400 mg TABS Take 1 tablet (400 mg total) by mouth daily, Starting Sun 8/7/2022, Until Tue 9/6/2022, Normal      meclizine (ANTIVERT) 12 5 MG tablet Take 1 tablet (12 5 mg total) by mouth 3 (three) times a day as needed for dizziness, Starting Wed 8/3/2022, Normal      metFORMIN (GLUCOPHAGE-XR) 500 mg 24 hr tablet Take 2 tablets (1,000 mg total) by mouth daily with breakfast, Starting Tue 5/10/2022, Normal      metoclopramide (Reglan) 10 mg tablet Take 1 tablet (10 mg total) by mouth every 6 (six) hours, Starting Sat 5/21/2022, Normal      olmesartan (BENICAR) 20 mg tablet Take 1 tablet (20 mg total) by mouth in the morning , Starting Mon 5/23/2022, Normal      onabotulinumtoxin A (BOTOX) 100 units Inject as directed, Starting Tue 8/29/2017, Historical Med      ondansetron (ZOFRAN) 4 mg tablet Take 1 tablet (4 mg total) by mouth every 6 (six) hours, Starting Fri 5/27/2022, Normal      pantoprazole (PROTONIX) 40 mg tablet Take 1 tablet (40 mg total) by mouth daily, Starting Fri 5/27/2022, Normal      prazosin (MINIPRESS) 1 mg capsule Take 1 capsule (1 mg total) by mouth daily at bedtime, Starting Fri 7/29/2022, Normal      Riboflavin 400 MG CAPS Take 1 capsule (400 mg total) by mouth in the morning, Starting Sun 8/7/2022, Until Tue 9/6/2022, Normal      Rimegepant Sulfate (NURTEC) 75 MG TBDP 1 tab at migraine onset  No more than one dose per 24 hours  Hold triptan , Normal      !!  Syringe/Needle, Disp, (SYRINGE 3CC/28WB9-5/4") 27G X 1-1/4" 3 ML MISC Use for IM injection of ketorolac , Normal      buPROPion (WELLBUTRIN XL) 300 mg 24 hr tablet Take 1 tablet (300 mg total) by mouth in the morning , Starting Wed 5/18/2022, Normal       !! - Potential duplicate medications found  Please discuss with provider  No discharge procedures on file      PDMP Review       Value Time User    PDMP Reviewed  Yes 5/18/2022 10:34 AM Tahmina Colon MD          ED Provider  Electronically Signed by           Natasha Brooks MD  08/18/22 0866

## 2022-08-19 ENCOUNTER — OFFICE VISIT (OUTPATIENT)
Dept: DENTISTRY | Facility: CLINIC | Age: 32
End: 2022-08-19

## 2022-08-19 ENCOUNTER — TELEMEDICINE (OUTPATIENT)
Dept: BEHAVIORAL/MENTAL HEALTH CLINIC | Facility: CLINIC | Age: 32
End: 2022-08-19
Payer: COMMERCIAL

## 2022-08-19 VITALS — SYSTOLIC BLOOD PRESSURE: 130 MMHG | TEMPERATURE: 98.6 F | DIASTOLIC BLOOD PRESSURE: 74 MMHG

## 2022-08-19 DIAGNOSIS — F41.1 GENERALIZED ANXIETY DISORDER: Chronic | ICD-10-CM

## 2022-08-19 DIAGNOSIS — Z29.8 ENCOUNTER FOR OTHER SPECIFIED PROPHYLACTIC MEASURES: Primary | ICD-10-CM

## 2022-08-19 DIAGNOSIS — F33.1 DEPRESSION, MAJOR, RECURRENT, MODERATE (HCC): Primary | Chronic | ICD-10-CM

## 2022-08-19 DIAGNOSIS — K02.9 DENTAL CARIES: Primary | ICD-10-CM

## 2022-08-19 PROCEDURE — 90834 PSYTX W PT 45 MINUTES: CPT | Performed by: SOCIAL WORKER

## 2022-08-19 PROCEDURE — D1330 ORAL HYGIENE INSTRUCTIONS: HCPCS

## 2022-08-19 PROCEDURE — D1110 PROPHYLAXIS - ADULT: HCPCS

## 2022-08-19 PROCEDURE — D2335 RESIN-BASED COMPOSITE - 4 OR MORE SURFACES OR INVOLVING INCISAL ANGLE (ANTERIOR): HCPCS | Performed by: DENTIST

## 2022-08-19 NOTE — PROGRESS NOTES
MUD consent form verified  No changes to medical history per MUD form  Pt is ASA 2  Patient reports pain level of 0  Patient denies any constitutional symptoms  Patient presents for restorative treatment #8-MDFL, 9-MIFL   EOE WNL  IOE shows no swelling or sinus tracts  Anesthesia: 20% benzocaine topical + 1 0 carpule of 4% articaine with 1:100k epi via buccal infiltration  Isolation: cotton rolls  Tx:  Caries excavation of tooth 8,9   Etch for 12 seconds with 37% phosphoric acid and rinsed, Ivoclar Adhese Universal bond placed with DeligicaPen 20 second scrub, air dried for 5 seconds and light cured and restored with Tetric composite    Polished restoration  Verified contacts and occlusion  Patient satisfied and dismissed alert and ambulatory  Reviewed post-op anesthesia precautions with patient           NV: Restorative 51,09,27

## 2022-08-19 NOTE — PSYCH
Virtual Regular Visit    Verification of patient location:    Patient is located in the following state in which I hold an active license [place]    Assessment/Plan:    Problem List Items Addressed This Visit        Other    Generalized anxiety disorder (Chronic)    Depression, major, recurrent, moderate (HCC) - Primary (Chronic)        Goals addressed in session: Goal 1      Reason for visit is No chief complaint on file  Encounter provider APARNA Hawkins    Provider located at [address] [place] [id number] [phone]    Recent Visits  No visits were found meeting these conditions  Showing recent visits within past 7 days and meeting all other requirements  Future Appointments  No visits were found meeting these conditions  Showing future appointments within next 150 days and meeting all other requirements       The patient was identified by name and date of birth  Freda Mo was informed that this is a telemedicine visit and that the visit is being conducted throughEpic Embedded and patient was informed this is a secure, HIPAA-complaint platform  She agrees to proceed     My office door was closed  No one else was in the room  She acknowledged consent and understanding of privacy and security of the video platform  The patient has agreed to participate and understands they can discontinue the visit at any time  Patient is aware this is a billable service  Hira Olivarez is a 33 y o  female  DATA: Met with Stephanie for scheduled individual session  Topics of discussion included relationships with family, physical health concerns and mood regulation and symptoms  "I'm a mess " Thiago Farr states that she has been continuing to experience an intractible migraine (for approximately 5 weeks)  She states that her neurologist is considering sending her inpatient for treatment   She states that her mood is "miserable " She states "I'm very sad  My depression's kicking in " She reached out to her friend for support  Her friend is offering her support  Stephanie expressed that she is feeling exhausted from her physical pain and feeling ill  She denies suicidal ideation  Rosangela Patrick discussed her friendship with Sharon Garza  She states that she feels like they have been getting closer over the past few months  Rosangela Patrick states that her uncle has been starting to "small-talk" with her  She states that she is feeling positively about this improvement in their relationship  Rosangela Patrick states that her mother is "very stressed" because she does not know how to help Rosangela Patrick with her medical symptoms  Client shows evidence of utilizing Mindfulness-based strategies and emotion regulation skills skills to manage mental health symptoms  During this session, this clinician used the following therapeutic modalities: supportive psychotherapy, client-centered therapy, mindfulness-based strategies, DBT-informed skills, Motivational Interviewing and solution-focused therapy  ASSESSMENT: Rosangela Patrick presents with a somewhat dysthymic mood  Her affect is normal range and intensity, appropriate  Rosangela Patrick exhibits good therapeutic rapport with this clinician  Rosangela Patrick continues to exhibit willingness to work on treatment goals and objectives  Rosangela Patrick presents with a minimal risk of suicide, minimal risk of self-harm, and minimal risk of harm to others  PLAN: Rosangela Patrick will return in approximately two weeks for the next scheduled session  Between sessions, Rosangela Patrick will follow up with her medical providers  She will continue to maintain at least minimal physical activity and engage in mindfulness-based strategies to manage her moods  She will report back during the next session re: successes and barriers   At the next session, this clinician will use supportive psychotherapy, client-centered therapy, mindfulness-based strategies, DBT-informed skills, Motivational Interviewing and solution-focused therapy to address her mood regulation, relationship issues, and physical health issues, in an effort to assist Alvin Marquis with meeting treatment goals  HPI     Past Medical History:   Diagnosis Date    Abnormal Pap smear of cervix     Allergic     Anxiety     Arthritis     Depression     Diabetes mellitus (HCC)     Fibromyalgia, primary     Hypertension     IBS (irritable bowel syndrome)     Migraine     Obesity     Vitamin B12 deficiency        Past Surgical History:   Procedure Laterality Date    COLONOSCOPY N/A 5/8/2018    Procedure: COLONOSCOPY;  Surgeon: Yoshi Moy MD;  Location: [facility]; Service: Gastroenterology    IA EXC SKIN BENIG >4 CM REMAINDR BODY N/A 7/1/2021    Procedure: EXCISION OF PILAR SCALP CYST X 2 AND RIGHT INNER GROIN NEVVUS;  Surgeon: Aurelio Quintana MD;  Location: [facility] MAIN OR;  Service: Plastics    IA RECMPL WND SCALP,EXTR 2 6-7 5 CM N/A 7/1/2021    Procedure: CLOSURE WOUND SCALP X 2 AND RIGHT INNER GROIN;  Surgeon: Aurelio Quintana MD;  Location: [facility] MAIN OR;  Service: Plastics    TONSILLECTOMY      WISDOM TOOTH EXTRACTION         Current Outpatient Medications   Medication Sig Dispense Refill    buPROPion (WELLBUTRIN XL) 300 mg 24 hr tablet Take 1 tablet (300 mg total) by mouth daily 90 tablet 0    acetaZOLAMIDE (DIAMOX) 250 mg tablet 1 tab BID x 1week, will increase further if needed  30 tablet 0    Aimovig 140 MG/ML SOAJ Inject 140mg under the skin every 30 (thirty) days  1 mL 10    ALPRAZolam (XANAX) 0 5 mg tablet Take 1 tablet (0 5 mg total) by mouth as needed in the morning and 1 tablet (0 5 mg total) as needed in the evening for anxiety   60 tablet 2    ARIPiprazole (ABILIFY) 2 mg tablet Take 1 tablet (2 mg total) by mouth daily 30 tablet 2    B-D 3CC LUER-DEANN SYR 25GX1" 25G X 1" 3 ML MISC        celecoxib (CeleBREX) 100 mg capsule Take 1 capsule (100 mg total) by mouth 2 (two) times a day Prn migraine  Hold toradol, indocin  20 capsule 0    dexamethasone (DECADRON) 2 mg tablet 1 tab qam with food prn migraine  (Patient not taking: No sig reported) 5 tablet 0    dicyclomine (BENTYL) 20 mg tablet Take 1 tablet (20 mg total) by mouth 2 (two) times a day 60 tablet 1    diphenoxylate-atropine (LOMOTIL) 2 5-0 025 mg per tablet 1 tablet 4 times daily as needed for diarrhea 30 tablet 1    escitalopram (LEXAPRO) 20 mg tablet Take 1 tablet (20 mg total) by mouth in the morning  30 tablet 2    etonogestrel-ethinyl estradiol (NuvaRing) 0 12-0 015 MG/24HR vaginal ring Insert ring for 21 days then remove for one week  3 each 0    indomethacin (INDOCIN) 25 mg capsule 1 tab BID prn severe headache with food  Hold toradol  30 capsule 0    ketorolac (TORADOL) 10 mg tablet Take 1 tablet (10 mg total) by mouth every 6 (six) hours as needed (migraine) Max 2-3 per week  10 tablet 0    magnesium Oxide (MAG-OX) 400 mg TABS Take 1 tablet (400 mg total) by mouth daily 30 tablet 0    meclizine (ANTIVERT) 12 5 MG tablet Take 1 tablet (12 5 mg total) by mouth 3 (three) times a day as needed for dizziness 30 tablet 0    metFORMIN (GLUCOPHAGE-XR) 500 mg 24 hr tablet Take 2 tablets (1,000 mg total) by mouth daily with breakfast 60 tablet 2    metoclopramide (Reglan) 10 mg tablet Take 1 tablet (10 mg total) by mouth every 6 (six) hours (Patient not taking: No sig reported) 30 tablet 0    OLANZapine (ZyPREXA) 2 5 mg tablet 1-2 tabs qhs prn migraine  Take 8-12 hours  from Abilify  Do not take with reglan  10 tablet 0    olmesartan (BENICAR) 20 mg tablet Take 1 tablet (20 mg total) by mouth in the morning   30 tablet 2    onabotulinumtoxin A (BOTOX) 100 units Inject as directed      ondansetron (ZOFRAN) 4 mg tablet Take 1 tablet (4 mg total) by mouth every 6 (six) hours 30 tablet 2    pantoprazole (PROTONIX) 40 mg tablet Take 1 tablet (40 mg total) by mouth daily 30 tablet 3    prazosin (MINIPRESS) 1 mg capsule Take 1 capsule (1 mg total) by mouth daily at bedtime 30 capsule 0    Riboflavin 400 MG CAPS Take 1 capsule (400 mg total) by mouth in the morning 30 capsule 0    Rimegepant Sulfate (NURTEC) 75 MG TBDP 1 tab at migraine onset  No more than one dose per 24 hours  Hold triptan  8 tablet 5    Syringe/Needle, Disp, (SYRINGE 3CC/86TG1-1/4") 27G X 1-1/4" 3 ML MISC Use for IM injection of ketorolac  4 each 0     Current Facility-Administered Medications   Medication Dose Route Frequency Provider Last Rate Last Admin    cyanocobalamin injection 1,000 mcg  1,000 mcg Intramuscular Q30 Days [facility], DO   1,000 mcg at 08/03/20 3893    cyanocobalamin injection 1,000 mcg  1,000 mcg Intramuscular Q14 Days [facility], DO   1,000 mcg at 05/18/20 1146    cyanocobalamin injection 1,000 mcg  1,000 mcg Intramuscular Q30 Days [facility], DO   1,000 mcg at 09/01/20 1129        Allergies   Allergen Reactions    Cimzia [Certolizumab Pegol] Swelling    Desvenlafaxine      Other reaction(s): state of confusion    Ixekizumab Vomiting    Valproic Acid Other (See Comments)     Other reaction(s): dilated pupils, "schizi"    Tizanidine Anxiety       Review of Systems    Video Exam    There were no vitals filed for this visit      Physical Exam     I spent 45 minutes directly with the patient during this visit     Session start time: 8:14am  Session end time: 8:59am

## 2022-08-19 NOTE — PROGRESS NOTES
Reason for visit:Routine Prophylaxis  Rooming Includes:  Dental Vitals recorded  Allergies Reviewed  Medication Reviewed  Dental Health Compliance: Twice daily brushing, never flossing, use of fluoride toothpaste  Medical History Reviewed  ASA 2 - Patient with mild systemic disease with no functional limitations    Patient has no complaints/no pain  Patient presents for hygiene appointment  Frankl + +  Treatment provided includes  adult prophy, handscale, polish(mint paste), floss,oral hygiene instructions and nutritional counseling  Intraoral exam/Oral Cancer Screening presents with no significant findings  Generalized marginal decalcification, discussed with patient and recommended fluoride mouthwash and diligent brushing along gums  Plaque buildup is generalized Moderate  Calculus buildup is Generalized  Light  Gingival evaluation is slight red with slight bleeding  Stain evaluation is no stain present  Oral hygiene instructions include brushing 2x daily and flossing daily  Next visit: restorative and 6 month recall  *Triplicate form indicated today's procedures and future visits needed   First page is on file in media center and 3rd page was sent home with patient

## 2022-08-22 ENCOUNTER — HOSPITAL ENCOUNTER (EMERGENCY)
Facility: HOSPITAL | Age: 32
Discharge: HOME | End: 2022-08-22
Attending: FAMILY MEDICINE
Payer: COMMERCIAL

## 2022-08-22 ENCOUNTER — TELEPHONE (OUTPATIENT)
Dept: NEUROLOGY | Facility: CLINIC | Age: 32
End: 2022-08-22

## 2022-08-22 VITALS
DIASTOLIC BLOOD PRESSURE: 88 MMHG | OXYGEN SATURATION: 98 % | HEART RATE: 98 BPM | RESPIRATION RATE: 18 BRPM | SYSTOLIC BLOOD PRESSURE: 141 MMHG

## 2022-08-22 DIAGNOSIS — G43.909 MIGRAINE: Primary | ICD-10-CM

## 2022-08-22 PROCEDURE — 96365 THER/PROPH/DIAG IV INF INIT: CPT

## 2022-08-22 PROCEDURE — 99284 EMERGENCY DEPT VISIT MOD MDM: CPT | Performed by: FAMILY MEDICINE

## 2022-08-22 PROCEDURE — 96375 TX/PRO/DX INJ NEW DRUG ADDON: CPT

## 2022-08-22 PROCEDURE — 99283 EMERGENCY DEPT VISIT LOW MDM: CPT

## 2022-08-22 PROCEDURE — 96366 THER/PROPH/DIAG IV INF ADDON: CPT

## 2022-08-22 RX ORDER — MAGNESIUM SULFATE 1 G/100ML
1 INJECTION INTRAVENOUS ONCE
Status: COMPLETED | OUTPATIENT
Start: 2022-08-22 | End: 2022-08-22

## 2022-08-22 RX ORDER — ONDANSETRON 2 MG/ML
4 INJECTION INTRAMUSCULAR; INTRAVENOUS ONCE
Status: COMPLETED | OUTPATIENT
Start: 2022-08-22 | End: 2022-08-22

## 2022-08-22 RX ORDER — KETOROLAC TROMETHAMINE 30 MG/ML
30 INJECTION, SOLUTION INTRAMUSCULAR; INTRAVENOUS ONCE
Status: COMPLETED | OUTPATIENT
Start: 2022-08-22 | End: 2022-08-22

## 2022-08-22 RX ORDER — DIPHENHYDRAMINE HYDROCHLORIDE 50 MG/ML
25 INJECTION INTRAMUSCULAR; INTRAVENOUS ONCE
Status: COMPLETED | OUTPATIENT
Start: 2022-08-22 | End: 2022-08-22

## 2022-08-22 RX ADMIN — MAGNESIUM SULFATE IN DEXTROSE 1 G: 10 INJECTION, SOLUTION INTRAVENOUS at 13:01

## 2022-08-22 RX ADMIN — KETOROLAC TROMETHAMINE 30 MG: 30 INJECTION, SOLUTION INTRAMUSCULAR at 12:59

## 2022-08-22 RX ADMIN — SODIUM CHLORIDE 1000 ML: 0.9 INJECTION, SOLUTION INTRAVENOUS at 12:57

## 2022-08-22 RX ADMIN — DIPHENHYDRAMINE HYDROCHLORIDE 25 MG: 50 INJECTION, SOLUTION INTRAMUSCULAR; INTRAVENOUS at 12:58

## 2022-08-22 RX ADMIN — ONDANSETRON 4 MG: 2 INJECTION INTRAMUSCULAR; INTRAVENOUS at 13:00

## 2022-08-22 NOTE — TELEPHONE ENCOUNTER
I tried calling number provided to me and busy signal- [phone]  TT sent back to Dr Inderjit Chacon  Will wait to hear back from her

## 2022-08-22 NOTE — TELEPHONE ENCOUNTER
TT from Dr Brady Gallego in ED states she was trying to get in touch with us but could not so she discharged the patient  Not sure how long she was waiting to hear back from us  Anyway, I will place transfer of care order to admit pt tomorrow under [facility]/ next available bed  Can you please ask pt if IV medication in ED helped today, and if not will try to admit tomorrow AM     Thanks

## 2022-08-22 NOTE — TELEPHONE ENCOUNTER
Received a call from Dr Zainab Adamson emergency [facility] requesting call back  [phone] regarding patient      TT sent to [facility]

## 2022-08-22 NOTE — ED PROVIDER NOTES
History  Chief Complaint   Patient presents with    Migraine     Patient reports migraine for 5 weeks  Patient reports the pain hasn't gotten any better after follow up with neurologist       JOVANY  This is a 71-year-old him female with history of migraine presented to ED with the complain of exacerbation of her migraine  Patient states she has been having this headache for past few weeks it has been need to ED multiple time  Patient states that she normally gets relief from a coming the ED in getting medication however she still suffering from headache  She states she touch with her neurologist who wanted to admit her for [facility] infusion however did not get a call back  States headache is worse today from his ED visit  Patient was seen at the ED on  head migraine cocktail with relief and was discharged home  States the headache is throbbing mainly on left side denies any photophobia does complain of some nausea but denies any vomiting  She denies any blurry vision double vision at this time  Denies any dizziness or neck pain  Denies numbness or tingling  Patient states that her PCP [place] [facility] start her on multiple medication for headache however she feels that it has not been helping  Prior to Admission Medications   Prescriptions Last Dose Informant Patient Reported? Taking? ALPRAZolam (XANAX) 0 5 mg tablet  Self No No   Sig: Take 1 tablet (0 5 mg total) by mouth as needed in the morning and 1 tablet (0 5 mg total) as needed in the evening for anxiety  ARIPiprazole (ABILIFY) 2 mg tablet   No No   Sig: Take 1 tablet (2 mg total) by mouth daily   Aimovig 140 MG/ML SOAJ  Self No No   Sig: Inject 140mg under the skin every 30 (thirty) days  B-D 3CC LUER-DEANN SYR 25GX1" 25G X 1" 3 ML MISC  Self Yes No   Sig:     OLANZapine (ZyPREXA) 2 5 mg tablet   No No   Si-2 tabs qhs prn migraine  Take 8-12 hours  from Abilify  Do not take with reglan     Riboflavin 400 MG CAPS   No No   Sig: Take 1 capsule (400 mg total) by mouth in the morning   Rimegepant Sulfate (NURTEC) 75 MG TBDP   No No   Si tab at migraine onset  No more than one dose per 24 hours  Hold triptan  Syringe/Needle, Disp, (SYRINGE 3CC/35KJ3-5/4") 27G X 1-4" 3 ML MISC  Self No No   Sig: Use for IM injection of ketorolac    acetaZOLAMIDE (DIAMOX) 250 mg tablet   No No   Si tab BID x 1week, will increase further if needed  buPROPion (WELLBUTRIN XL) 300 mg 24 hr tablet   No No   Sig: Take 1 tablet (300 mg total) by mouth daily   celecoxib (CeleBREX) 100 mg capsule   No No   Sig: Take 1 capsule (100 mg total) by mouth 2 (two) times a day Prn migraine  Hold toradol, indocin  dexamethasone (DECADRON) 2 mg tablet   No No   Si tab qam with food prn migraine  Patient not taking: No sig reported   dicyclomine (BENTYL) 20 mg tablet   No No   Sig: Take 1 tablet (20 mg total) by mouth 2 (two) times a day   diphenoxylate-atropine (LOMOTIL) 2 5-0 025 mg per tablet  Self No No   Si tablet 4 times daily as needed for diarrhea   escitalopram (LEXAPRO) 20 mg tablet  Self No No   Sig: Take 1 tablet (20 mg total) by mouth in the morning    etonogestrel-ethinyl estradiol (NuvaRing) 0 12-0 015 MG/24HR vaginal ring   No No   Sig: Insert ring for 21 days then remove for one week  indomethacin (INDOCIN) 25 mg capsule   No No   Si tab BID prn severe headache with food   Hold toradol    ketorolac (TORADOL) 10 mg tablet  Self No No   Sig: Take 1 tablet (10 mg total) by mouth every 6 (six) hours as needed (migraine) Max 2-3 per week    magnesium Oxide (MAG-OX) 400 mg TABS   No No   Sig: Take 1 tablet (400 mg total) by mouth daily   meclizine (ANTIVERT) 12 5 MG tablet   No No   Sig: Take 1 tablet (12 5 mg total) by mouth 3 (three) times a day as needed for dizziness   metFORMIN (GLUCOPHAGE-XR) 500 mg 24 hr tablet   No No   Sig: Take 2 tablets (1,000 mg total) by mouth daily with breakfast   metoclopramide (Reglan) 10 mg tablet   No No Sig: Take 1 tablet (10 mg total) by mouth every 6 (six) hours   Patient not taking: No sig reported   olmesartan (BENICAR) 20 mg tablet  Self No No   Sig: Take 1 tablet (20 mg total) by mouth in the morning  onabotulinumtoxin A (BOTOX) 100 units  Self Yes No   Sig: Inject as directed   ondansetron (ZOFRAN) 4 mg tablet   No No   Sig: Take 1 tablet (4 mg total) by mouth every 6 (six) hours   pantoprazole (PROTONIX) 40 mg tablet   No No   Sig: Take 1 tablet (40 mg total) by mouth daily   prazosin (MINIPRESS) 1 mg capsule   No No   Sig: Take 1 capsule (1 mg total) by mouth daily at bedtime      Facility-Administered Medications Last Administration Doses Remaining   cyanocobalamin injection 1,000 mcg 8/3/2020  8:59 AM    cyanocobalamin injection 1,000 mcg 5/18/2020 11:46 AM    cyanocobalamin injection 1,000 mcg 9/1/2020 11:29 AM           Past Medical History:   Diagnosis Date    Abnormal Pap smear of cervix     Allergic     Anxiety     Arthritis     Depression     Diabetes mellitus (HCC)     Fibromyalgia, primary     Hypertension     IBS (irritable bowel syndrome)     Migraine     Obesity     Vitamin B12 deficiency        Past Surgical History:   Procedure Laterality Date    COLONOSCOPY N/A 5/8/2018    Procedure: COLONOSCOPY;  Surgeon: Aurora Arceo MD;  Location: [facility] GI LAB;   Service: Gastroenterology    ME EXC SKIN BENIG >4 CM REMAINDR BODY N/A 7/1/2021    Procedure: EXCISION OF PILAR SCALP CYST X 2 AND RIGHT INNER GROIN NEVVUS;  Surgeon: Oliver Kent MD;  Location: [address] [place] [address] MAIN OR;  Service: Plastics    ME RECMPL WND SCALP,EXTR 2 6-7 5 CM N/A 7/1/2021    Procedure: CLOSURE WOUND SCALP X 2 AND RIGHT INNER GROIN;  Surgeon: Oliver Kent MD;  Location:  MAIN OR;  Service: Plastics    TONSILLECTOMY      WISDOM TOOTH EXTRACTION         Family History   Problem Relation Age of Onset    Rheum arthritis Mother     Psoriasis Mother     Other Mother     Hypertension Mother     Diabetes unspecified Mother     Sjogren's syndrome Mother     Alcohol abuse Mother     Drug abuse Mother     Anxiety disorder Father     Alcohol abuse Father     Lung cancer Maternal Grandfather     Cancer Other         bone    Diabetes Other     Other Other         High blood pressure    Depression Maternal Grandmother      I have reviewed and agree with the history as documented  E-Cigarette/Vaping    E-Cigarette Use Current Some Day User     Start Date 7/1/19     Comments 1 cartridge every 2 months       E-Cigarette/Vaping Substances    Nicotine No     THC Yes     CBD Yes     Flavoring No     Other No     Unknown No      Social History     Tobacco Use    Smoking status: Never Smoker    Smokeless tobacco: Never Used   Vaping Use    Vaping Use: Some days    Start date: 7/1/2019    Substances: THC, CBD   Substance Use Topics    Alcohol use: Yes     Comment: rarely    Drug use: Yes     Frequency: 2 0 times per week     Types: Marijuana     Comment: medical marijuana (vape)       Review of Systems   Constitutional: Negative  HENT: Negative  Eyes: Negative  Respiratory: Negative  Cardiovascular: Negative  Gastrointestinal: Negative  Endocrine: Negative  Genitourinary: Negative  Musculoskeletal: Negative  Skin: Negative  Allergic/Immunologic: Negative  Neurological: Positive for headaches  Negative for dizziness  Hematological: Negative  Psychiatric/Behavioral: Negative  Physical Exam  Physical Exam  Vitals and nursing note reviewed  Constitutional:       Appearance: She is well-developed  HENT:      Head: Normocephalic and atraumatic  Right Ear: External ear normal       Left Ear: External ear normal       Nose: Nose normal       Mouth/Throat:      Mouth: Mucous membranes are moist       Pharynx: No oropharyngeal exudate  Eyes:      General: No scleral icterus  Right eye: No discharge  Left eye: No discharge  Conjunctiva/sclera: Conjunctivae normal       Pupils: Pupils are equal, round, and reactive to light  Cardiovascular:      Rate and Rhythm: Normal rate and regular rhythm  Pulses: Normal pulses  Heart sounds: Normal heart sounds  Pulmonary:      Effort: Pulmonary effort is normal  No respiratory distress  Breath sounds: Normal breath sounds  No wheezing  Abdominal:      General: Bowel sounds are normal       Palpations: Abdomen is soft  Musculoskeletal:         General: Normal range of motion  Cervical back: Normal range of motion and neck supple  Lymphadenopathy:      Cervical: No cervical adenopathy  Skin:     General: Skin is warm and dry  Capillary Refill: Capillary refill takes less than 2 seconds  Neurological:      General: No focal deficit present  Mental Status: She is alert and oriented to person, place, and time     Psychiatric:         Mood and Affect: Mood normal          Behavior: Behavior normal          Vital Signs  ED Triage Vitals [08/22/22 1231]   Temp Pulse Respirations Blood Pressure SpO2   -- 98 18 141/88 98 %      Temp src Heart Rate Source Patient Position - Orthostatic VS BP Location FiO2 (%)   -- -- Sitting Right arm --      Pain Score       7           Vitals:    08/22/22 1231   BP: 141/88   Pulse: 98   Patient Position - Orthostatic VS: Sitting         Visual Acuity      ED Medications  Medications   sodium chloride 0 9 % bolus 1,000 mL (1,000 mL Intravenous New Bag 8/22/22 1257)   magnesium sulfate IVPB (premix) SOLN 1 g (1 g Intravenous New Bag 8/22/22 1301)   ketorolac (TORADOL) injection 30 mg (30 mg Intravenous Given 8/22/22 1259)   diphenhydrAMINE (BENADRYL) injection 25 mg (25 mg Intravenous Given 8/22/22 1258)   ondansetron (ZOFRAN) injection 4 mg (4 mg Intravenous Given 8/22/22 1300)       Diagnostic Studies  Results Reviewed     None                 No orders to display              Procedures  Procedures         ED Course  ED Course as of 08/22/22 1455   Mon Aug 22, 2022   1454 Patient states she is feeling much better  Recommending follow-up with the Neurology as an outpatient  I have tried multiple time to call patient Neurology team getting transfer left voicemail for call back  MDM    Disposition  Final diagnoses:   Migraine     Time reflects when diagnosis was documented in both MDM as applicable and the Disposition within this note     Time User Action Codes Description Comment    8/22/2022  2:55 PM Quiana Madrigal Add [Y40 777] Migraine       ED Disposition     ED Disposition   Discharge    Condition   Stable    Date/Time   Mon Aug 22, 2022  2:55 PM    Comment   Jan Mcdonald discharge to home/self care  Follow-up Information     Follow up With Specialties Details Why Contact Info    Donna Dumont DO Family Medicine Schedule an appointment as soon as possible for a visit in 2 days If symptoms worsen [facility] [address] [phone]             Patient's Medications   Discharge Prescriptions    No medications on file       No discharge procedures on file      PDMP Review       Value Time User    PDMP Reviewed  Yes 5/18/2022 10:34 AM Merlinda Fang, MD          ED Provider  Electronically Signed by           Jacqueline Pa MD  08/22/22 [phone]

## 2022-08-23 ENCOUNTER — HOSPITAL ENCOUNTER (INPATIENT)
Facility: HOSPITAL | Age: 32
LOS: 2 days | Discharge: HOME | End: 2022-08-25
Attending: PSYCHIATRY & NEUROLOGY | Admitting: PSYCHIATRY & NEUROLOGY
Payer: COMMERCIAL

## 2022-08-23 PROBLEM — E11.9 TYPE 2 DIABETES MELLITUS (HCC): Status: ACTIVE | Noted: 2022-08-23

## 2022-08-23 PROBLEM — R73.03 PREDIABETES: Status: ACTIVE | Noted: 2022-08-23

## 2022-08-23 PROBLEM — G43.711 INTRACTABLE CHRONIC MIGRAINE WITHOUT AURA AND WITH STATUS MIGRAINOSUS: Status: ACTIVE | Noted: 2022-08-23

## 2022-08-23 LAB
ALBUMIN SERPL BCP-MCNC: 3 G/DL (ref 3.5–5)
ALP SERPL-CCNC: 73 U/L (ref 46–116)
ALT SERPL W P-5'-P-CCNC: 14 U/L (ref 12–78)
ANION GAP SERPL CALCULATED.3IONS-SCNC: 6 MMOL/L (ref 4–13)
AST SERPL W P-5'-P-CCNC: 10 U/L (ref 5–45)
BASOPHILS # BLD AUTO: 0.03 THOUSANDS/ΜL (ref 0–0.1)
BASOPHILS NFR BLD AUTO: 0 % (ref 0–1)
BILIRUB SERPL-MCNC: 0.36 MG/DL (ref 0.2–1)
BUN SERPL-MCNC: 6 MG/DL (ref 5–25)
CALCIUM ALBUM COR SERPL-MCNC: 10.2 MG/DL (ref 8.3–10.1)
CALCIUM SERPL-MCNC: 9.4 MG/DL (ref 8.3–10.1)
CHLORIDE SERPL-SCNC: 108 MMOL/L (ref 96–108)
CO2 SERPL-SCNC: 23 MMOL/L (ref 21–32)
CREAT SERPL-MCNC: 0.77 MG/DL (ref 0.6–1.3)
EOSINOPHIL # BLD AUTO: 0.14 THOUSAND/ΜL (ref 0–0.61)
EOSINOPHIL NFR BLD AUTO: 1 % (ref 0–6)
ERYTHROCYTE [DISTWIDTH] IN BLOOD BY AUTOMATED COUNT: 14.6 % (ref 11.6–15.1)
GFR SERPL CREATININE-BSD FRML MDRD: 103 ML/MIN/1.73SQ M
GLUCOSE SERPL-MCNC: 73 MG/DL (ref 65–140)
HCG SERPL QL: NEGATIVE
HCT VFR BLD AUTO: 33.5 % (ref 34.8–46.1)
HGB BLD-MCNC: 11.2 G/DL (ref 11.5–15.4)
IMM GRANULOCYTES # BLD AUTO: 0.03 THOUSAND/UL (ref 0–0.2)
IMM GRANULOCYTES NFR BLD AUTO: 0 % (ref 0–2)
LYMPHOCYTES # BLD AUTO: 4 THOUSANDS/ΜL (ref 0.6–4.47)
LYMPHOCYTES NFR BLD AUTO: 41 % (ref 14–44)
MCH RBC QN AUTO: 28.9 PG (ref 26.8–34.3)
MCHC RBC AUTO-ENTMCNC: 33.4 G/DL (ref 31.4–37.4)
MCV RBC AUTO: 86 FL (ref 82–98)
MONOCYTES # BLD AUTO: 0.73 THOUSAND/ΜL (ref 0.17–1.22)
MONOCYTES NFR BLD AUTO: 8 % (ref 4–12)
NEUTROPHILS # BLD AUTO: 4.74 THOUSANDS/ΜL (ref 1.85–7.62)
NEUTS SEG NFR BLD AUTO: 50 % (ref 43–75)
NRBC BLD AUTO-RTO: 0 /100 WBCS
PLATELET # BLD AUTO: 487 THOUSANDS/UL (ref 149–390)
PMV BLD AUTO: 8.8 FL (ref 8.9–12.7)
POTASSIUM SERPL-SCNC: 3.7 MMOL/L (ref 3.5–5.3)
PROT SERPL-MCNC: 7.3 G/DL (ref 6.4–8.4)
RBC # BLD AUTO: 3.88 MILLION/UL (ref 3.81–5.12)
SODIUM SERPL-SCNC: 137 MMOL/L (ref 135–147)
WBC # BLD AUTO: 9.67 THOUSAND/UL (ref 4.31–10.16)

## 2022-08-23 PROCEDURE — 85025 COMPLETE CBC W/AUTO DIFF WBC: CPT

## 2022-08-23 PROCEDURE — 84703 CHORIONIC GONADOTROPIN ASSAY: CPT

## 2022-08-23 PROCEDURE — 80053 COMPREHEN METABOLIC PANEL: CPT

## 2022-08-23 RX ORDER — BUPROPION HYDROCHLORIDE 150 MG/1
300 TABLET ORAL DAILY
Status: DISCONTINUED | OUTPATIENT
Start: 2022-08-23 | End: 2022-08-25 | Stop reason: HOSPADM

## 2022-08-23 RX ORDER — KETOROLAC TROMETHAMINE 30 MG/ML
30 INJECTION, SOLUTION INTRAMUSCULAR; INTRAVENOUS EVERY 8 HOURS
Status: COMPLETED | OUTPATIENT
Start: 2022-08-23 | End: 2022-08-24

## 2022-08-23 RX ORDER — ESCITALOPRAM OXALATE 20 MG/1
20 TABLET ORAL DAILY
Status: DISCONTINUED | OUTPATIENT
Start: 2022-08-23 | End: 2022-08-25 | Stop reason: HOSPADM

## 2022-08-23 RX ORDER — MAGNESIUM SULFATE HEPTAHYDRATE 40 MG/ML
2 INJECTION, SOLUTION INTRAVENOUS
Status: DISCONTINUED | OUTPATIENT
Start: 2022-08-23 | End: 2022-08-25 | Stop reason: HOSPADM

## 2022-08-23 RX ORDER — LOSARTAN POTASSIUM 50 MG/1
50 TABLET ORAL DAILY
Refills: 2 | Status: DISCONTINUED | OUTPATIENT
Start: 2022-08-23 | End: 2022-08-25 | Stop reason: HOSPADM

## 2022-08-23 RX ORDER — DIPHENHYDRAMINE HYDROCHLORIDE 50 MG/ML
25 INJECTION INTRAMUSCULAR; INTRAVENOUS EVERY 8 HOURS
Status: DISCONTINUED | OUTPATIENT
Start: 2022-08-23 | End: 2022-08-25 | Stop reason: HOSPADM

## 2022-08-23 RX ORDER — SODIUM CHLORIDE 9 MG/ML
75 INJECTION, SOLUTION INTRAVENOUS CONTINUOUS
Status: DISCONTINUED | OUTPATIENT
Start: 2022-08-23 | End: 2022-08-25 | Stop reason: HOSPADM

## 2022-08-23 RX ORDER — PANTOPRAZOLE SODIUM 40 MG/1
40 TABLET, DELAYED RELEASE ORAL DAILY
Status: DISCONTINUED | OUTPATIENT
Start: 2022-08-23 | End: 2022-08-25 | Stop reason: HOSPADM

## 2022-08-23 RX ORDER — DICYCLOMINE HCL 20 MG
20 TABLET ORAL 2 TIMES DAILY
Status: DISCONTINUED | OUTPATIENT
Start: 2022-08-23 | End: 2022-08-25 | Stop reason: HOSPADM

## 2022-08-23 RX ORDER — DIPHENHYDRAMINE HYDROCHLORIDE 50 MG/ML
25 INJECTION INTRAMUSCULAR; INTRAVENOUS EVERY 8 HOURS PRN
Status: DISCONTINUED | OUTPATIENT
Start: 2022-08-23 | End: 2022-08-23

## 2022-08-23 RX ORDER — METOCLOPRAMIDE HYDROCHLORIDE 5 MG/ML
10 INJECTION INTRAMUSCULAR; INTRAVENOUS EVERY 8 HOURS SCHEDULED
Status: DISCONTINUED | OUTPATIENT
Start: 2022-08-23 | End: 2022-08-25 | Stop reason: HOSPADM

## 2022-08-23 RX ORDER — PRAZOSIN HYDROCHLORIDE 1 MG/1
1 CAPSULE ORAL
Status: DISCONTINUED | OUTPATIENT
Start: 2022-08-23 | End: 2022-08-23

## 2022-08-23 RX ADMIN — SODIUM CHLORIDE 500 MG: 0.9 INJECTION, SOLUTION INTRAVENOUS at 20:34

## 2022-08-23 RX ADMIN — ESCITALOPRAM OXALATE 20 MG: 20 TABLET ORAL at 20:32

## 2022-08-23 RX ADMIN — SODIUM CHLORIDE 75 ML/HR: 0.9 INJECTION, SOLUTION INTRAVENOUS at 17:23

## 2022-08-23 RX ADMIN — DIPHENHYDRAMINE HYDROCHLORIDE 25 MG: 50 INJECTION, SOLUTION INTRAMUSCULAR; INTRAVENOUS at 17:22

## 2022-08-23 RX ADMIN — METOCLOPRAMIDE HYDROCHLORIDE 10 MG: 5 INJECTION INTRAMUSCULAR; INTRAVENOUS at 17:22

## 2022-08-23 RX ADMIN — BUPROPION HYDROCHLORIDE 300 MG: 150 TABLET, FILM COATED, EXTENDED RELEASE ORAL at 20:36

## 2022-08-23 RX ADMIN — KETOROLAC TROMETHAMINE 30 MG: 30 INJECTION, SOLUTION INTRAMUSCULAR; INTRAVENOUS at 17:21

## 2022-08-23 RX ADMIN — MAGNESIUM SULFATE HEPTAHYDRATE 2 G: 40 INJECTION, SOLUTION INTRAVENOUS at 17:22

## 2022-08-23 RX ADMIN — DICYCLOMINE HYDROCHLORIDE 20 MG: 20 TABLET ORAL at 20:32

## 2022-08-23 NOTE — ASSESSMENT & PLAN NOTE
34 yo F w/ a PMH of  inflammatory spondylopathy ( ankylosing spondylitis), fibromyalgia, costochondritis, and migraines coming in for Intractable chronic migraine  That has been ongoing for the past 5 weeks and is similar in characteristics as her previous migraines but is longer this time  Vapes at max 7 days a month and only when patient is having migraines otherwise not normally on vaping /smoking/marijuana use  No visual acuity or diplopia changes or caused desaturation is appreciated  She does note she has a little bit pain when moving her eyes side to side  She denies any usage of hypervitaminosis or any other vitamins besides the magnesium that she is prescribed for her migraines  She is currently on NuvaRing for  Contraception  Noted that migraine cocktail that she normally gets in the hospital that she is currently getting usually helps    08/25: improved migraine at 1/10 and head some belly ache after DHE yesterday after 10-15 minutes that resolved    Previously tried meds  · Tylenol, Decadron, sumatriptan, Maxalt, Toradol, indomethacin, Reglan, naproxen, olanzapine, Nurtec  · Botox was last injected 7/26/2022  Also takes aimovig monthly   · Trigger point injections were performed for the migraine headache on 8/3/2022  · Nurtec was approved and she tried that but it did not help  She was given meclizine, Toradol injection, Diamox, all did not help  · She has side effects to Depakote        W/U:  MRI brain w/ and w/o contrast unremarkable  ESR: 44   serum HCG negative  · Urine HCG: negative (08/16/22)  · CTH (7/26): unremarkable  · MRI brain (9/2020): unremarkable    Plan:   · MRI brain  unremarkable  · Current regimen:  · Day 3 of Toradol 30 mg IM q8h, Benadryl 25 mg IV q8h, Reglan 10 mg IV q8h, Mag Sulfate IV 2g q24h  · Day 3 of Solumedrol 500 mg IV q24h x3 days  · Got 0 5mg of DHE and had vast improvement of migraine to 1/10 08/24  Got additional 1mg of DHE today and will be d/c today with close f/u w/ Lilly Harden   Discussed importance of hydration as well as ensuring she gets adequate sleep to help decrease frequency of her migraines

## 2022-08-23 NOTE — H&P
NEUROLOGY RESIDENCY - ADMISSION H&P NOTE     Name: Chema Mejia   Age & Sex: 33 y o  female   MRN: [id number]  Unit/Bed#: [facility] 609-01   Encounter: [id number]    ASSESSMENT & PLAN     Intractable chronic migraine without aura and with status migrainosus  Assessment & Plan  32 yo F w/ a PMH of  inflammatory spondylopathy, fibromyalgia, costochondritis, and migraines coming in for Intractable chronic migraine  Previously tried meds  - Tylenol, Decadron, sumatriptan, Maxalt, Toradol, indomethacin, Reglan, naproxen, olanzapine, Nurtec  - Botox was last injected 7/26/2022  Also takes Jorje Alise  - Trigger point injections were performed for the migraine headache on 8/3/2022     - Nurtec was approved and she tried that but it did not help  She was given meclizine, Toradol injection, Diamox, all did not help  - She has side effects to Depakote          W/U:  Urine HCG: negative (08/16/22)      Plan   - per Cely Monge PA-C: Would recommend mag sulf, DHE, solumedrol   - PRN toradol, reglan, benadryl  Recommendations for outpatient neurological follow up have yet to be determined  VTE Prophylaxis: {VTE Prophylaxis Meds:18462}  / {Mechanical VTE Prophylaxis:69491}   Code Status: Full Code     Anticipated Length of Stay:  Patient will be admitted on an Inpatient basis with an anticipated length of stay of 2 midnights  Justification for Hospital Stay: headaches treatment ongoing    CHIEF COMPLAINT   No chief complaint on file  HISTORY OF PRESENT ILLNESS     Ilan Husbands Maurilio Swanson is a 33 y o  female  with pertinent history of ***, who presented for ***    ED visits for migraine including 7/24, 7/26, 7/31, 8/4, 8/7, 8/16 and today 8/22      Per Lilly's Note 08/03/22 and telephone visit:     Reaches pain level at worst: 10/10  Frequency: 2-3 migraines per month prior to this event; currently daily headache since end of July per chart  Duration: 1-2 days if able to catch it  Location: left frontal/ temporal, last night on right side, apex, headband region  Quality: throbbing  Worse with: not worse with bend, cough, sneeze  Associated with: nausea, vomiting, photophobia, phonophobia, dizziness, new sxs of drunk feeling and spacy     Triggers: Certain smells, sometimes caffeine, skipping meals, lack of sleep, menstrual cycle      Aura/ warning: none     Medications tried:  Prevention-  Aimovig- helping  Emgality  Depakote-side effects/allergy  Gabapentin  Methocarbamol  Tizanidine-allergy  Lyrica  Propranolol  Topamax, Trokendi XR  Zoloft     Abortive-  Tylenol  Decadron- typically helps break the cycle, did not this time unfortunately  Sumatriptan  Rizatriptan  toradol- takes first, then uses indocin if that fails (recently these have not been helping)  Indomethacin  Reglan, Zofran  Naproxen  Olanzapine-cannot take since on Abilify  ? Teodora Lint     Other non-medication therapies or treatments-  - TPIs  - CBD/ THC     Neck pain and description: none  Sleep concerns: sleeps okay        REVIEW OF SYSTEMS     Review of Systems    PAST MEDICAL HISTORY     Past Medical History:   Diagnosis Date    Abnormal Pap smear of cervix     Allergic     Anxiety     Arthritis     Depression     Diabetes mellitus (HCC)     Fibromyalgia, primary     Hypertension     IBS (irritable bowel syndrome)     Migraine     Obesity     Vitamin B12 deficiency        Allergies: Allergies   Allergen Reactions    Cimzia [Certolizumab Pegol] Swelling    Desvenlafaxine      Other reaction(s): state of confusion    Ixekizumab Vomiting    Valproic Acid Other (See Comments)     Other reaction(s): dilated pupils, "schizi"    Tizanidine Anxiety       PAST SURGICAL HISTORY     Past Surgical History:   Procedure Laterality Date    COLONOSCOPY N/A 5/8/2018    Procedure: COLONOSCOPY;  Surgeon: Emmit Sandhoff, MD;  Location: [facility] GI LAB;   Service: Gastroenterology    TN EXC SKIN BENIG >4 CM REMAINDR BODY N/A 2021    Procedure: EXCISION OF PILAR SCALP CYST X 2 AND RIGHT INNER GROIN NEVVUS;  Surgeon: Diego Pedersen MD;  Location: [facility] MAIN OR;  Service: Plastics    OR RECMPL WND SCALP,EXTR 2 6-7 5 CM N/A 2021    Procedure: CLOSURE WOUND SCALP X 2 AND RIGHT INNER GROIN;  Surgeon: Diego Pedersen MD;  Location: [facility] MAIN OR;  Service: Plastics    TONSILLECTOMY      WISDOM TOOTH EXTRACTION         SOCIAL & FAMILY HISTORY     Social History     Substance and Sexual Activity   Alcohol Use Yes    Comment: rarely     Substance and Sexual Activity   Alcohol Use Yes    Comment: rarely        Substance and Sexual Activity   Drug Use Yes    Frequency: 2 0 times per week    Types: Marijuana    Comment: medical marijuana (vape)     Social History     Tobacco Use   Smoking Status Never Smoker   Smokeless Tobacco Never Used       Marital Status: Single   Occupation: ***  Patient Pre-hospital Living Situation: ***  Patient Pre-hospital Level of Mobility: ***  Patient Pre-hospital Diet Restrictions: ***    Family History:  {SL IP FAM HISTORY SMARTLIST:382960253}        OBJECTIVE     Vitals:    22 1455   BP: 134/83   Pulse: 103   Resp: 19   Temp: 98 4 °F (36 9 °C)   SpO2: 97%        Temperature:   Temp (24hrs), Av 4 °F (36 9 °C), Min:98 4 °F (36 9 °C), Max:98 4 °F (36 9 °C)    Temperature: 98 4 °F (36 9 °C)    Intake & Output:  I/O     None          Weights: There is no height or weight on file to calculate BMI  Weight (last 2 days)     None          Physical Exam     Neurologic Exam     *** Delete this line once neuro exam completed  LABORATORY DATA     Labs: {Results Review Statement:90984}        Invalid input(s):  EOSPCT       Invalid input(s): LABALBU                         Micro:  Lab Results   Component Value Date    BLOODCX No Growth After 5 Days  2022    BLOODCX No Growth After 5 Days   2022    URINECX <10,000 cfu/ml  2022    URINECX 40,000-49,000 cfu/ml  06/10/2022    URINECX (A) 06/02/2020     60,000-69,000 cfu/ml Citrobacter amalonaticus complex    URINECX 20,000-29,000 cfu/ml  06/02/2020       IMAGING & DIAGNOSTIC TESTING     Radiology Results: {Results Review Statement:71060}    No orders to display       Other Diagnostic Testing: {Results Review Statement:29881}    ACTIVE MEDICATIONS     Current Facility-Administered Medications   Medication Dose Route Frequency    buPROPion (WELLBUTRIN XL) 24 hr tablet 300 mg  300 mg Oral Daily    dicyclomine (BENTYL) tablet 20 mg  20 mg Oral BID    diphenhydrAMINE (BENADRYL) injection 25 mg  25 mg Intravenous Q8H PRN    escitalopram (LEXAPRO) tablet 20 mg  20 mg Oral Daily    losartan (COZAAR) tablet 50 mg  50 mg Oral Daily    magnesium sulfate 2 g/50 mL IVPB (premix) 2 g  2 g Intravenous Q24H MATT    methylPREDNISolone sodium succinate (Solu-MEDROL) 500 mg in sodium chloride 0 9 % 250 mL IVPB  500 mg Intravenous Once    metoclopramide (REGLAN) injection 10 mg  10 mg Intravenous Q8H [address]    pantoprazole (PROTONIX) EC tablet 40 mg  40 mg Oral Daily    prazosin (MINIPRESS) capsule 1 mg  1 mg Oral HS    sodium chloride 0 9 % infusion  75 mL/hr Intravenous Continuous         HOME MEDICATIONS     Prior to Admission medications    Medication Sig Start Date End Date Taking? Authorizing Provider   acetaZOLAMIDE (DIAMOX) 250 mg tablet 1 tab BID x 1week, will increase further if needed  8/5/22   Valerio Montes PA-C   Aimovig 140 MG/ML SOAJ Inject 140mg under the skin every 30 (thirty) days  3/15/22   Valerio Montes PA-C   ALPRAZolam Audery Haslett) 0 5 mg tablet Take 1 tablet (0 5 mg total) by mouth as needed in the morning and 1 tablet (0 5 mg total) as needed in the evening for anxiety   5/18/22   Emelina Marie MD   ARIPiprazole (ABILIFY) 2 mg tablet Take 1 tablet (2 mg total) by mouth daily 8/9/22   Emelina Marie MD   B-D 3CC LUER-DEANN SYR 25GX1" 25G X 1" 3 ML MISC   7/26/18   Historical Provider, MD   buPROPion (WELLBUTRIN XL) 300 mg 24 hr tablet Take 1 tablet (300 mg total) by mouth daily 8/17/22   Karl Razo MD   celecoxib (CeleBREX) 100 mg capsule Take 1 capsule (100 mg total) by mouth 2 (two) times a day Prn migraine  Hold toradol, indocin  8/17/22   Kunal Mak PA-C   dexamethasone (DECADRON) 2 mg tablet 1 tab qam with food prn migraine  Patient not taking: No sig reported 7/26/22   Kunal Mak PA-C   dicyclomine (BENTYL) 20 mg tablet Take 1 tablet (20 mg total) by mouth 2 (two) times a day 5/27/22   Noam Lopez MD   diphenoxylate-atropine (LOMOTIL) 2 5-0 025 mg per tablet 1 tablet 4 times daily as needed for diarrhea 5/23/22   Morena Aroryo DO   escitalopram (LEXAPRO) 20 mg tablet Take 1 tablet (20 mg total) by mouth in the morning  5/18/22   Karl Razo MD   etonogestrel-ethinyl estradiol (NuvaRing) 0 12-0 015 MG/24HR vaginal ring Insert ring for 21 days then remove for one week  7/18/22 7/22/23  Rory Hughes MD   indomethacin (INDOCIN) 25 mg capsule 1 tab BID prn severe headache with food  Hold toradol  7/29/22   Kunal Mak PA-C   ketorolac (TORADOL) 10 mg tablet Take 1 tablet (10 mg total) by mouth every 6 (six) hours as needed (migraine) Max 2-3 per week  3/22/22   James Link PA-C   magnesium Oxide (MAG-OX) 400 mg TABS Take 1 tablet (400 mg total) by mouth daily 8/7/22 9/6/22  Oren Herron MD   meclizine (ANTIVERT) 12 5 MG tablet Take 1 tablet (12 5 mg total) by mouth 3 (three) times a day as needed for dizziness 8/3/22   Kunal Mak PA-C   metFORMIN (GLUCOPHAGE-XR) 500 mg 24 hr tablet Take 2 tablets (1,000 mg total) by mouth daily with breakfast 5/10/22   Morena Arroyo DO   metoclopramide (Reglan) 10 mg tablet Take 1 tablet (10 mg total) by mouth every 6 (six) hours  Patient not taking: No sig reported 5/21/22   Laurel Sanches DO   OLANZapine (ZyPREXA) 2 5 mg tablet 1-2 tabs qhs prn migraine  Take 8-12 hours  from Abilify  Do not take with reglan   8/17/22   Sarah Resendiz ROSAMARIA Perez   olmesartan (BENICAR) 20 mg tablet Take 1 tablet (20 mg total) by mouth in the morning  5/23/22   Dariana Saldivar DO   onabotulinumtoxin A (BOTOX) 100 units Inject as directed 8/29/17   Historical Provider, MD   ondansetron (ZOFRAN) 4 mg tablet Take 1 tablet (4 mg total) by mouth every 6 (six) hours 5/27/22   Teto Sheth MD   pantoprazole (PROTONIX) 40 mg tablet Take 1 tablet (40 mg total) by mouth daily 5/27/22   Teto Sheth MD   prazosin (MINIPRESS) 1 mg capsule Take 1 capsule (1 mg total) by mouth daily at bedtime 7/29/22   Soraya Thayer MD   Riboflavin 400 MG CAPS Take 1 capsule (400 mg total) by mouth in the morning 8/7/22 9/6/22  Dariel Herron MD   Rimegepant Sulfate (NURTEC) 75 MG TBDP 1 tab at migraine onset  No more than one dose per 24 hours  Hold triptan   8/10/22   Hattie Godoy PA-C   Syringe/Needle, Disp, (SYRINGE 3CC/49KH2-7/4") 27G X 1-1/4" 3 ML MISC Use for IM injection of ketorolac  6/24/19   Hattie Godoy PA-C     ACTIVE MEDICATIONS    ==  MD Rasheeda Capone [facility] Neurology Residency, PGY-***

## 2022-08-23 NOTE — H&P
[facility] Neurology Admission H&P note   Name: Freda Mo   Age & Sex: 33 y o  female   MRN: [id number]  Unit/Bed#: [facility] 609-01   Encounter: [id number]    Assessment and Plan:   * Intractable chronic migraine without aura and with status migrainosus  Assessment & Plan  34 yo F w/ a PMH of  inflammatory spondylopathy, fibromyalgia, costochondritis, and migraines coming in for Intractable chronic migraine  Previously tried meds  · Tylenol, Decadron, sumatriptan, Maxalt, Toradol, indomethacin, Reglan, naproxen, olanzapine, Nurtec  · Botox was last injected 7/26/2022  Also takes aimovig monthly   · Trigger point injections were performed for the migraine headache on 8/3/2022  · Nurtec was approved and she tried that but it did not help  She was given meclizine, Toradol injection, Diamox, all did not help  · She has side effects to Depakote  W/U:  · Urine HCG: negative (08/16/22)  · CTH (7/26): unremarkable  · MRI brain (9/2020): unremarkable    Plan:   · MRI brain w and wo contrast ordered  · F/u labwork   · Toradol 30 mg IM q8h, Benadryl 25 mg IV q8h, Reglan 10 mg IV q8h, Mag Sulfate IV 2g q24h  · Solumedrol 500 mg IV q24h  · NS at 75 cc/hr  · monitor hemodynamics, temp curves closely       Depression, major, recurrent, moderate (HCC)  Assessment & Plan  · Continue home Lexapro, bupropion, Abilify    Prediabetes  Assessment & Plan  Home PO meds: Metformin 500 mg daily- prior   Patient reports not using any anti hyperglycemic meds    Plan:   Monitor BMP      Hypertension  Assessment & Plan  · Cozaar 50 mg QD  · Monitor HD closely        Recommendations for outpatient neurological follow up have yet to be determined  Pending for discharge: migraine tx     VTE Prophylaxis: no pharmacologix ppx  / sequential compression device   Code Status: full code     Anticipated Length of Stay:  Patient will be admitted on an Inpatient basis with an anticipated length of stay of 2 midnights       Justification for Hospital Stay: migraine treatment     Chief Complaint:  Migraine     HPI:     Shanae Alexander is a 33 y o  female  with pertinent history of migraines, anxiety, depression, prediabetes, fibromyalgia, HTN, IBS, obesity, B12 deficiency, who presents as a direct admit for ongoing migraine  Current migraine began 5 weeks ago and is described as a pulsating feeling on the left side, occasionally radiating down her neck (neck pain new)  Associated with photophobia, nausea, drunk feeling (new symptom)  The drunk feeling has occurred twice, 1st time being the day of her Botox injection outpatient immediately after she drove home  Patient seen in the ED on 8/22/22 for the migraine    This afternoon, her migraine is 4-5/10  Similar in character as before, not associated with any double vision or blurry vision  Denies dizziness or neck pain at the time of interview/exam  Able to sleep well  Denies numbness or tingling  Patient is seen by [facility] Neurology team outpatient  No increased stress recently, infections, travel reported  Last CTH on 7/26- unremarkable  Last MRI in 9/4/2020 was unremarkable  Per Ruba Drake note from 8/3/22 and telephone visit: patient has 2-3 migraines per month prior to this event; currently daily headache since end of July per chart  Duration: 1-2 days if able to catch it  Located in left frontal/ temporal, occasionally on right side, apex, headband region  Throbbing quality  Does not worsen with bending, cough, sneezing  Associated with: nausea, vomiting, photophobia, phonophobia, dizziness, new sxs of drunk feeling and spacy  Triggered by certain smells, sometimes caffeine, skipping meals, lack of sleep, menstrual cycle  Has no aura/warning       Medications tried so far:    Preventative:   Aimovig- helping  Emgality  Depakote-side effects/allergy  Gabapentin  Methocarbamol  Tizanidine-allergy  Lyrica  Propranolol  Topamax, Trokendi XR  Zoloft     Abortive: Tylenol  Decadron- typically helps break the cycle, did not this time unfortunately  Sumatriptan  Rizatriptan  toradol- takes first, then uses indocin if that fails (recently these have not been helping)  Indomethacin  Reglan, Zofran  Naproxen  Olanzapine-cannot take since on Abilify    Other non-medication therapies or treatments-  - TPIs  - CBD/ THC    Regarding the current migraine, Botox was last injected 7/26/2022  Also takes aimovig monthly  Trigger point injections were performed for the migraine headache on 8/3/2022  Nurtec was approved and she tried that but it did not help  She was given meclizine, Toradol injection, Diamox, all of which did not help the current migraine  Patient discussed w/ outpatient Neuro team and is now going to be admitted under Neurology service for further workup, management of ongoing migraine  Discussed clinical plan with patient, mother at bedside- they verbalized understanding  Review of Systems- see HPI    Past Medical History:   Diagnosis Date    Abnormal Pap smear of cervix     Allergic     Anxiety     Arthritis     Depression     Diabetes mellitus ([address] )     Fibromyalgia, primary     Hypertension     IBS (irritable bowel syndrome)     Migraine     Obesity     Vitamin B12 deficiency        Allergies: Allergies   Allergen Reactions    Cimzia [Certolizumab Pegol] Swelling    Desvenlafaxine      Other reaction(s): state of confusion    Ixekizumab Vomiting    Valproic Acid Other (See Comments)     Other reaction(s): dilated pupils, "schizi"    Tizanidine Anxiety         Past Surgical History:   Procedure Laterality Date    COLONOSCOPY N/A 5/8/2018    Procedure: COLONOSCOPY;  Surgeon: Perla Rose MD;  Location: [facility] GI LAB;   Service: Gastroenterology    TN EXC SKIN BENIG >4 CM REMAINDR BODY N/A 7/1/2021    Procedure: EXCISION OF PILAR SCALP CYST X 2 AND RIGHT INNER GROIN NEVVUS;  Surgeon: Heath Cortez MD;  Location: [facility] OR;  Service: Plastics    OR RECMPL WND SCALP,EXTR 2 6-7 5 CM N/A 2021    Procedure: CLOSURE WOUND SCALP X 2 AND RIGHT INNER GROIN;  Surgeon: Coleen Mensah MD;  Location: [address] [place] [address];  Service: Plastics    TONSILLECTOMY      WISDOM TOOTH EXTRACTION         Social Hx:     Social History     Substance and Sexual Activity   Alcohol Use Yes    Comment: rarely     Substance and Sexual Activity   Alcohol Use Yes    Comment: rarely        Substance and Sexual Activity   Drug Use Yes    Frequency: 2 0 times per week    Types: Marijuana    Comment: medical marijuana (vape)     Social History     Tobacco Use   Smoking Status Never Smoker   Smokeless Tobacco Never Used       Marital Status: Single   Occupation: does not work currently   Patient Pre-hospital Living Situation: unknown   Patient Pre-hospital Level of Mobility: walks w/o assistance   Patient Pre-hospital Diet Restrictions: unknown    Family History:  Family History   Problem Relation Age of Onset    Rheum arthritis Mother     Psoriasis Mother     Other Mother     Hypertension Mother     Diabetes unspecified Mother     Sjogren's syndrome Mother     Alcohol abuse Mother     Drug abuse Mother     Anxiety disorder Father     Alcohol abuse Father     Lung cancer Maternal Grandfather     Cancer Other         bone    Diabetes Other     Other Other         High blood pressure    Depression Maternal Grandmother          Objective:   Vitals:    22 1455   BP: 134/83   Pulse: 103   Resp: 19   Temp: 98 4 °F (36 9 °C)   SpO2: 97%        Temperature:   Temp (24hrs), Av 4 °F (36 9 °C), Min:98 4 °F (36 9 °C), Max:98 4 °F (36 9 °C)    Temperature: 98 4 °F (36 9 °C)    Intake & Output:  I/O     None          Weights: There is no height or weight on file to calculate BMI  Weight (last 2 days)     None          Physical Exam:   Vitals reviewed  General Examination: No distress, cooperative  Neurologic Exam:   Mental Status:  A, O x3   Follows simple, 3 step commands  Language: Normal fluency and comprehension  Cranial Nerves:  Pupils equal  VFFTC  EOMI, no nystagums  Face symmetric with equal activation  No dysarthria  Motor:  No pronator drift  Strength 5/5 proximally and distally in all 4 extremities  No tremor  Reflexes: +2, and symmetric (biceps, brachioradialis, triceps, patella)  Coordination: Finger to nose normal b/l  Gait: Deferred       Labs: ordered labwork; pending  Invalid input(s):  EOSPCT       Invalid input(s): LABALBU                       Micro:  Lab Results   Component Value Date    BLOODCX No Growth After 5 Days  04/16/2022    BLOODCX No Growth After 5 Days  04/16/2022    URINECX <10,000 cfu/ml  08/03/2022    URINECX 40,000-49,000 cfu/ml  06/10/2022    URINECX (A) 06/02/2020     60,000-69,000 cfu/ml Citrobacter amalonaticus complex    URINECX 20,000-29,000 cfu/ml  06/02/2020         Imaging and diagnostic studies:    Radiology Results: will follow up    MRI inpatient order    (Results Pending)     Reviewed last [facility], MRI reports         Other Diagnostic Testing:    Active Meds:   Current Facility-Administered Medications   Medication Dose Route Frequency    buPROPion (WELLBUTRIN XL) 24 hr tablet 300 mg  300 mg Oral Daily    dicyclomine (BENTYL) tablet 20 mg  20 mg Oral BID    diphenhydrAMINE (BENADRYL) injection 25 mg  25 mg Intravenous Q8H    escitalopram (LEXAPRO) tablet 20 mg  20 mg Oral Daily    ketorolac (TORADOL) injection 30 mg  30 mg Intramuscular Q8H    losartan (COZAAR) tablet 50 mg  50 mg Oral Daily    magnesium sulfate 2 g/50 mL IVPB (premix) 2 g  2 g Intravenous Q24H [facility]    methylPREDNISolone sodium succinate (Solu-MEDROL) 500 mg in sodium chloride 0 9 % 250 mL IVPB  500 mg Intravenous Once    metoclopramide (REGLAN) injection 10 mg  10 mg Intravenous Q8H [facility]    pantoprazole (PROTONIX) EC tablet 40 mg  40 mg Oral Daily    sodium chloride 0 9 % infusion  75 mL/hr Intravenous Continuous         Home Medications:   Prior to Admission medications    Medication Sig Start Date End Date Taking? Authorizing Provider   acetaZOLAMIDE (DIAMOX) 250 mg tablet 1 tab BID x 1week, will increase further if needed  8/5/22   Anuel Graham PA-C   Aimovig 140 MG/ML SOAJ Inject 140mg under the skin every 30 (thirty) days  3/15/22   Anuel Graham PA-C   ALPRAZolam Osie Cools) 0 5 mg tablet Take 1 tablet (0 5 mg total) by mouth as needed in the morning and 1 tablet (0 5 mg total) as needed in the evening for anxiety  5/18/22   Kush Oviedo MD   ARIPiprazole (ABILIFY) 2 mg tablet Take 1 tablet (2 mg total) by mouth daily 8/9/22   Kush Oviedo MD   B-D 3CC LUER-DEANN SYR 25GX1" 25G X 1" 3 ML MISC   7/26/18   Historical Provider, MD   buPROPion (WELLBUTRIN XL) 300 mg 24 hr tablet Take 1 tablet (300 mg total) by mouth daily 8/17/22   Kush Oviedo MD   celecoxib (CeleBREX) 100 mg capsule Take 1 capsule (100 mg total) by mouth 2 (two) times a day Prn migraine  Hold toradol, indocin  8/17/22   Anuel Graham PA-C   dexamethasone (DECADRON) 2 mg tablet 1 tab qam with food prn migraine  Patient not taking: No sig reported 7/26/22   Anuel Graham PA-C   dicyclomine (BENTYL) 20 mg tablet Take 1 tablet (20 mg total) by mouth 2 (two) times a day 5/27/22   Noam Rodriguez MD   diphenoxylate-atropine (LOMOTIL) 2 5-0 025 mg per tablet 1 tablet 4 times daily as needed for diarrhea 5/23/22   Ramon Cavazos DO   escitalopram (LEXAPRO) 20 mg tablet Take 1 tablet (20 mg total) by mouth in the morning  5/18/22   Kush Oviedo MD   etonogestrel-ethinyl estradiol (NuvaRing) 0 12-0 015 MG/24HR vaginal ring Insert ring for 21 days then remove for one week  7/18/22 7/22/23  Wilberto Sheikh MD   indomethacin (INDOCIN) 25 mg capsule 1 tab BID prn severe headache with food  Hold toradol   7/29/22   Anuel Graham PA-C   ketorolac (TORADOL) 10 mg tablet Take 1 tablet (10 mg total) by mouth every 6 (six) hours as needed (migraine) Max 2-3 per week  3/22/22   Lucien Lee PA-C   magnesium Oxide (MAG-OX) 400 mg TABS Take 1 tablet (400 mg total) by mouth daily 8/7/22 9/6/22  Zeferino Herron MD   meclizine (ANTIVERT) 12 5 MG tablet Take 1 tablet (12 5 mg total) by mouth 3 (three) times a day as needed for dizziness 8/3/22   Darrius Stone PA-C   metFORMIN (GLUCOPHAGE-XR) 500 mg 24 hr tablet Take 2 tablets (1,000 mg total) by mouth daily with breakfast 5/10/22   Laura Graft, DO   metoclopramide (Reglan) 10 mg tablet Take 1 tablet (10 mg total) by mouth every 6 (six) hours  Patient not taking: No sig reported 5/21/22   Kym Morse,    OLANZapine (ZyPREXA) 2 5 mg tablet 1-2 tabs qhs prn migraine  Take 8-12 hours  from Abilify  Do not take with reglan  8/17/22   Darrius Stone PA-C   olmesartan (BENICAR) 20 mg tablet Take 1 tablet (20 mg total) by mouth in the morning  5/23/22   Laura Graft, DO   onabotulinumtoxin A (BOTOX) 100 units Inject as directed 8/29/17   Historical Provider, MD   ondansetron (ZOFRAN) 4 mg tablet Take 1 tablet (4 mg total) by mouth every 6 (six) hours 5/27/22   Demetrice Silverio MD   pantoprazole (PROTONIX) 40 mg tablet Take 1 tablet (40 mg total) by mouth daily 5/27/22   Demetrice Silverio MD   prazosin (MINIPRESS) 1 mg capsule Take 1 capsule (1 mg total) by mouth daily at bedtime 7/29/22   Ko Mcrae MD   Riboflavin 400 MG CAPS Take 1 capsule (400 mg total) by mouth in the morning 8/7/22 9/6/22  Zeferino Herron MD   Rimegepant Sulfate (NURTEC) 75 MG TBDP 1 tab at migraine onset  No more than one dose per 24 hours  Hold triptan   8/10/22   Darrius Stone PA-C   Syringe/Needle, Disp, (SYRINGE 3CC/92NW8-3/4") 27G X 1-1/4" 3 ML MISC Use for IM injection of ketorolac  6/24/19   Darrius Stone PA-C     ACTIVE MEDICATION  Code Status: full code   Discussed with patient, mother      ==  MD Akil [facility]z's Neurology Residency, PGY-II

## 2022-08-23 NOTE — ASSESSMENT & PLAN NOTE
Home PO meds: Metformin 500 mg daily- prior   Patient reports not using any anti hyperglycemic meds    Plan:   Monitor BMP

## 2022-08-24 ENCOUNTER — APPOINTMENT (OUTPATIENT)
Dept: RADIOLOGY | Facility: HOSPITAL | Age: 32
End: 2022-08-24
Payer: COMMERCIAL

## 2022-08-24 LAB
ANION GAP SERPL CALCULATED.3IONS-SCNC: 12 MMOL/L (ref 4–13)
BUN SERPL-MCNC: 7 MG/DL (ref 5–25)
CALCIUM SERPL-MCNC: 8.9 MG/DL (ref 8.3–10.1)
CHLORIDE SERPL-SCNC: 105 MMOL/L (ref 96–108)
CO2 SERPL-SCNC: 19 MMOL/L (ref 21–32)
CREAT SERPL-MCNC: 0.96 MG/DL (ref 0.6–1.3)
ERYTHROCYTE [DISTWIDTH] IN BLOOD BY AUTOMATED COUNT: 14.4 % (ref 11.6–15.1)
ERYTHROCYTE [SEDIMENTATION RATE] IN BLOOD: 44 MM/HOUR (ref 0–19)
GFR SERPL CREATININE-BSD FRML MDRD: 79 ML/MIN/1.73SQ M
GLUCOSE SERPL-MCNC: 180 MG/DL (ref 65–140)
HCT VFR BLD AUTO: 35 % (ref 34.8–46.1)
HGB BLD-MCNC: 11.5 G/DL (ref 11.5–15.4)
MCH RBC QN AUTO: 27.8 PG (ref 26.8–34.3)
MCHC RBC AUTO-ENTMCNC: 32.9 G/DL (ref 31.4–37.4)
MCV RBC AUTO: 85 FL (ref 82–98)
PLATELET # BLD AUTO: 502 THOUSANDS/UL (ref 149–390)
PMV BLD AUTO: 9 FL (ref 8.9–12.7)
POTASSIUM SERPL-SCNC: 3.9 MMOL/L (ref 3.5–5.3)
RBC # BLD AUTO: 4.13 MILLION/UL (ref 3.81–5.12)
SODIUM SERPL-SCNC: 136 MMOL/L (ref 135–147)
WBC # BLD AUTO: 8.74 THOUSAND/UL (ref 4.31–10.16)

## 2022-08-24 PROCEDURE — 85652 RBC SED RATE AUTOMATED: CPT

## 2022-08-24 PROCEDURE — A9585 GADOBUTROL INJECTION: HCPCS

## 2022-08-24 PROCEDURE — 70553 MRI BRAIN STEM W/O & W/DYE: CPT

## 2022-08-24 PROCEDURE — 85027 COMPLETE CBC AUTOMATED: CPT

## 2022-08-24 PROCEDURE — 80048 BASIC METABOLIC PNL TOTAL CA: CPT

## 2022-08-24 PROCEDURE — G1004 CDSM NDSC: HCPCS

## 2022-08-24 RX ORDER — LANOLIN ALCOHOL/MO/W.PET/CERES
3 CREAM (GRAM) TOPICAL
Status: DISCONTINUED | OUTPATIENT
Start: 2022-08-24 | End: 2022-08-25 | Stop reason: HOSPADM

## 2022-08-24 RX ORDER — ENOXAPARIN SODIUM 100 MG/ML
40 INJECTION SUBCUTANEOUS
Status: COMPLETED | OUTPATIENT
Start: 2022-08-24 | End: 2022-08-24

## 2022-08-24 RX ORDER — DIHYDROERGOTAMINE MESYLATE 1 MG/ML
0.5 INJECTION, SOLUTION INTRAMUSCULAR; INTRAVENOUS; SUBCUTANEOUS ONCE
Status: COMPLETED | OUTPATIENT
Start: 2022-08-24 | End: 2022-08-24

## 2022-08-24 RX ORDER — DIHYDROERGOTAMINE MESYLATE 1 MG/ML
1 INJECTION, SOLUTION INTRAMUSCULAR; INTRAVENOUS; SUBCUTANEOUS
Status: CANCELLED | OUTPATIENT
Start: 2022-08-24

## 2022-08-24 RX ORDER — KETOROLAC TROMETHAMINE 30 MG/ML
30 INJECTION, SOLUTION INTRAMUSCULAR; INTRAVENOUS EVERY 8 HOURS
Status: COMPLETED | OUTPATIENT
Start: 2022-08-24 | End: 2022-08-25

## 2022-08-24 RX ADMIN — GADOBUTROL 10 ML: 604.72 INJECTION INTRAVENOUS at 02:33

## 2022-08-24 RX ADMIN — METOCLOPRAMIDE HYDROCHLORIDE 10 MG: 5 INJECTION INTRAMUSCULAR; INTRAVENOUS at 17:10

## 2022-08-24 RX ADMIN — DIHYDROERGOTAMINE MESYLATE 0.5 MG: 1 INJECTION, SOLUTION INTRAMUSCULAR; INTRAVENOUS; SUBCUTANEOUS at 17:34

## 2022-08-24 RX ADMIN — DIPHENHYDRAMINE HYDROCHLORIDE 25 MG: 50 INJECTION, SOLUTION INTRAMUSCULAR; INTRAVENOUS at 00:47

## 2022-08-24 RX ADMIN — PANTOPRAZOLE SODIUM 40 MG: 40 TABLET, DELAYED RELEASE ORAL at 10:01

## 2022-08-24 RX ADMIN — ESCITALOPRAM OXALATE 20 MG: 20 TABLET ORAL at 20:24

## 2022-08-24 RX ADMIN — METOCLOPRAMIDE HYDROCHLORIDE 10 MG: 5 INJECTION INTRAMUSCULAR; INTRAVENOUS at 00:49

## 2022-08-24 RX ADMIN — DICYCLOMINE HYDROCHLORIDE 20 MG: 20 TABLET ORAL at 20:24

## 2022-08-24 RX ADMIN — LOSARTAN POTASSIUM 50 MG: 50 TABLET, FILM COATED ORAL at 10:01

## 2022-08-24 RX ADMIN — DIPHENHYDRAMINE HYDROCHLORIDE 25 MG: 50 INJECTION, SOLUTION INTRAMUSCULAR; INTRAVENOUS at 17:06

## 2022-08-24 RX ADMIN — KETOROLAC TROMETHAMINE 30 MG: 30 INJECTION, SOLUTION INTRAMUSCULAR; INTRAVENOUS at 10:01

## 2022-08-24 RX ADMIN — Medication 3 MG: at 20:24

## 2022-08-24 RX ADMIN — KETOROLAC TROMETHAMINE 30 MG: 30 INJECTION, SOLUTION INTRAMUSCULAR; INTRAVENOUS at 00:52

## 2022-08-24 RX ADMIN — METOCLOPRAMIDE HYDROCHLORIDE 10 MG: 5 INJECTION INTRAMUSCULAR; INTRAVENOUS at 10:01

## 2022-08-24 RX ADMIN — DIPHENHYDRAMINE HYDROCHLORIDE 25 MG: 50 INJECTION, SOLUTION INTRAMUSCULAR; INTRAVENOUS at 10:01

## 2022-08-24 RX ADMIN — ENOXAPARIN SODIUM 40 MG: 40 INJECTION SUBCUTANEOUS at 13:46

## 2022-08-24 RX ADMIN — BUPROPION HYDROCHLORIDE 300 MG: 150 TABLET, FILM COATED, EXTENDED RELEASE ORAL at 20:24

## 2022-08-24 RX ADMIN — MAGNESIUM SULFATE HEPTAHYDRATE 2 G: 40 INJECTION, SOLUTION INTRAVENOUS at 17:07

## 2022-08-24 RX ADMIN — KETOROLAC TROMETHAMINE 30 MG: 30 INJECTION, SOLUTION INTRAMUSCULAR; INTRAVENOUS at 17:06

## 2022-08-24 NOTE — PROGRESS NOTES
NEUROLOGY RESIDENCY PROGRESS NOTE     Name: Darshan Currie   Age & Sex: 33 y o  female   MRN: [id number]  Unit/Bed#: [facility] 966-53   Encounter: [id number]    Darshan Currie will need follow up in in 6 weeks with headache provider Lilly Perez  She will not require outpatient neurological testing  ASSESSMENT & PLAN     * Intractable chronic migraine without aura and with status migrainosus  Assessment & Plan  32 yo F w/ a PMH of  inflammatory spondylopathy ( ankylosing spondylitis), fibromyalgia, costochondritis, and migraines coming in for Intractable chronic migraine  That has been ongoing for the past 5 weeks and is similar in characteristics as her previous migraines but is longer this time  Vapes at max 7 days a month and only when patient is having migraines otherwise not normally on vaping /smoking/marijuana use  No visual acuity or diplopia changes or caused desaturation is appreciated  She does note she has a little bit pain when moving her eyes side to side  She denies any usage of hypervitaminosis or any other vitamins besides the magnesium that she is prescribed for her migraines  She is currently on NuvaRing for  Contraception  Noted that migraine cocktail that she normally gets in the hospital that she is currently getting usually helps    Previously tried meds  · Tylenol, Decadron, sumatriptan, Maxalt, Toradol, indomethacin, Reglan, naproxen, olanzapine, Nurtec  · Botox was last injected 7/26/2022  Also takes aimovig monthly   · Trigger point injections were performed for the migraine headache on 8/3/2022  · Nurtec was approved and she tried that but it did not help  She was given meclizine, Toradol injection, Diamox, all did not help  · She has side effects to Depakote        W/U:  MRI brain w/ and w/o contrast unremarkable  ESR: 44   serum HCG negative  · Urine HCG: negative (08/16/22)  · CTH (7/26): unremarkable  · MRI brain (9/2020): unremarkable    Plan:   · MRI brain  unremarkable  · Current regimen:  · Day 2 of Toradol 30 mg IM q8h, Benadryl 25 mg IV q8h, Reglan 10 mg IV q8h, Mag Sulfate IV 2g q24h  · Day 2 of Solumedrol 500 mg IV q24h x3 days  · If no improvement this afternoon, will consider DHE  · NS at 75 cc/hr  · monitor hemodynamics, temp curves closely   ·  consider an LP and MRV in the future if no further improvement      Prediabetes  Assessment & Plan  Home PO meds: Metformin 500 mg daily- prior   Patient reports not using any anti hyperglycemic meds    Plan:   Monitor BMP      Hypertension  Assessment & Plan  · Cozaar 50 mg QD  · Monitor HD closely    Depression, major, recurrent, moderate (HCC)  Assessment & Plan  · Continue home Lexapro, bupropion, Abilify            SUBJECTIVE     Patient was seen and examined  No acute events overnight  Patient noted this morning that her migraine is 6/10 and similar in quality as previous migraines  She is a little surprised though that usually the migraine cocktail she normally gets the hospital which is what she is getting currently usually improves her migraines  She does note that she has only gone about 2 hours of sleep over the past night which likely exacerbated her migraines  She is agreeable to getting melatonin later in the evening  In regards to characteristics of his migraine compared to previous 1 the only difference is that she notes that this 1 is longer in duration and that she felt like she was lightheaded and buttock skated the 1st week but had has since subsided  She states that usually her Botox helps when she ends up getting it but this time it did not help her recently  She denies any diplopia and  Some eye pain/ discomfort when she moves her eyes side to side minimally when going vertically up and down  She denies any tinnitus and any color desaturation or visual symptoms /vision difficulties    She states she is able to read without problem in able to navigate on her computer without issue       She denies any vitamin usage besides the magnesium that she is prescribed  She is on the NuvaRing for contraception and currently does not smoke regularly but only some vapes when she has her migraines but at max about 7 days month  Denies any recent sections, travel, changes in stress /life events  Review of Systems    She denies any fevers/chill, chest pain, shortness of breath, abdominal pain, nausea, vomiting, diarrhea, problems urinating, problems  With bowel movements, and is eating/drinking without problem       She denies any  syncope, paresthesias, diplopia, visual changes, tinnitus, sudden onset weakness, garbled speech, dysarthria/slurring of words,  Loss of bowel or bladder        OBJECTIVE     Patient ID: Collin Frazier is a 33 y o  female  Vitals:    22 1757 22 2209 22 0753 22 0800   BP:  112/76 124/80    Pulse:  74 87    Resp:  16 18    Temp:  98 3 °F (36 8 °C) 98 1 °F (36 7 °C)    SpO2:  97% 96% 96%   Weight: 107 kg (235 lb)      Height:          Temperature:   Temp (24hrs), Av 3 °F (36 8 °C), Min:98 1 °F (36 7 °C), Max:98 4 °F (36 9 °C)    Temperature: 98 1 °F (36 7 °C)      Physical Exam  Vitals and nursing note reviewed  Constitutional:       General: She is not in acute distress  Appearance: She is well-developed  HENT:      Head: Normocephalic and atraumatic  Eyes:      Extraocular Movements: EOM normal       Conjunctiva/sclera: Conjunctivae normal       Pupils: Pupils are equal, round, and reactive to light  Cardiovascular:      Rate and Rhythm: Normal rate and regular rhythm  Heart sounds: No murmur heard  Pulmonary:      Effort: Pulmonary effort is normal  No respiratory distress  Breath sounds: Normal breath sounds  Abdominal:      Palpations: Abdomen is soft  Tenderness: There is no abdominal tenderness  Musculoskeletal:      Cervical back: Neck supple  Skin:     General: Skin is warm and dry  Neurological:      Mental Status: She is alert and oriented to person, place, and time  Coordination: Finger-Nose-Finger Test and Heel to X [facility] Test normal       Deep Tendon Reflexes: Strength normal       Reflex Scores:       Tricep reflexes are 2+ on the right side and 2+ on the left side  Bicep reflexes are 2+ on the right side and 2+ on the left side  Brachioradialis reflexes are 2+ on the right side and 2+ on the left side  Patellar reflexes are 2+ on the right side and 2+ on the left side  Achilles reflexes are 2+ on the right side and 2+ on the left side  Neurologic Exam     Mental Status   Oriented to person, place, and time  Able to name object  no abnormalities noted on funduscopic exam patient was able to read different size letters on her computer without issue and no color desaturation in terms of red/ green color changes     Cranial Nerves     CN II   Visual fields full to confrontation  CN III, IV, VI   Pupils are equal, round, and reactive to light  Extraocular motions are normal    CN III: no CN III palsy  CN VI: no CN VI palsy  Nystagmus: none     CN V   Facial sensation intact  CN VII   Facial expression full, symmetric  CN VIII   CN VIII normal      CN IX, X   CN IX normal    CN X normal      CN XI   CN XI normal      CN XII   CN XII normal      Motor Exam   Right arm pronator drift: absent  Left arm pronator drift: absent    Strength   Strength 5/5 throughout       Sensory Exam   Light touch normal      Gait, Coordination, and Reflexes     Coordination   Finger to nose coordination: normal  Heel to shin coordination: normal    Tremor   Resting tremor: absent  Intention tremor: absent  Action tremor: absent    Reflexes   Right brachioradialis: 2+  Left brachioradialis: 2+  Right biceps: 2+  Left biceps: 2+  Right triceps: 2+  Left triceps: 2+  Right patellar: 2+  Left patellar: 2+  Right achilles: 2+  Left achilles: 2+  Right plantar: normal  Left plantar: normal             LABORATORY DATA     Labs: I have personally reviewed pertinent reports  and I have personally reviewed pertinent films in PACS  Results from last 7 days   Lab Units 08/24/22  0859 08/23/22  1646   WBC Thousand/uL 8 74 9 67   HEMOGLOBIN g/dL 11 5 11 2*   HEMATOCRIT % 35 0 33 5*   PLATELETS Thousands/uL 502* 487*   NEUTROS PCT %  --  50   MONOS PCT %  --  8      Results from last 7 days   Lab Units 08/24/22  0859 08/23/22  1646   SODIUM mmol/L 136 137   POTASSIUM mmol/L 3 9 3 7   CHLORIDE mmol/L 105 108   CO2 mmol/L 19* 23   BUN mg/dL 7 6   CREATININE mg/dL 0 96 0 77   CALCIUM mg/dL 8 9 9 4   ALK PHOS U/L  --  73   ALT U/L  --  14   AST U/L  --  10                            IMAGING & DIAGNOSTIC TESTING     Radiology Results: I have personally reviewed pertinent reports  and I have personally reviewed pertinent films in PACS    MRI brain w wo contrast   Final Result by Darin Gupta MD (08/24 0249)      No acute intracranial abnormality  No signal abnormality or abnormal enhancement within the brain parenchyma  Workstation performed: [id number]             Other Diagnostic Testing: I have personally reviewed pertinent reports     and I have personally reviewed pertinent films in PACS    ACTIVE MEDICATIONS     Current Facility-Administered Medications   Medication Dose Route Frequency    buPROPion (WELLBUTRIN XL) 24 hr tablet 300 mg  300 mg Oral Daily    dicyclomine (BENTYL) tablet 20 mg  20 mg Oral BID    diphenhydrAMINE (BENADRYL) injection 25 mg  25 mg Intravenous Q8H    enoxaparin (LOVENOX) subcutaneous injection 40 mg  40 mg Subcutaneous Q24H [facility]    escitalopram (LEXAPRO) tablet 20 mg  20 mg Oral Daily    ketorolac (TORADOL) injection 30 mg  30 mg Intramuscular Q8H    losartan (COZAAR) tablet 50 mg  50 mg Oral Daily    magnesium sulfate 2 g/50 mL IVPB (premix) 2 g  2 g Intravenous Q24H [address]    melatonin tablet 3 mg  3 mg Oral HS    metoclopramide (REGLAN) injection 10 mg  10 mg Intravenous Q8H [facility]    pantoprazole (PROTONIX) EC tablet 40 mg  40 mg Oral Daily    sodium chloride 0 9 % infusion  75 mL/hr Intravenous Continuous       Prior to Admission medications    Medication Sig Start Date End Date Taking? Authorizing Provider   acetaZOLAMIDE (DIAMOX) 250 mg tablet 1 tab BID x 1week, will increase further if needed  8/5/22   Kana Vaz PA-C   Aimovig 140 MG/ML SOAJ Inject 140mg under the skin every 30 (thirty) days  3/15/22   Kana Vaz PA-C   ALPRAZolam Terri [place]) 0 5 mg tablet Take 1 tablet (0 5 mg total) by mouth as needed in the morning and 1 tablet (0 5 mg total) as needed in the evening for anxiety  5/18/22   Doug Arrieta MD   ARIPiprazole (ABILIFY) 2 mg tablet Take 1 tablet (2 mg total) by mouth daily 8/9/22   Doug Arrieta MD   B-D 3CC LUER-DEANN SYR 25GX1" 25G X 1" 3 ML MISC   7/26/18   Historical Provider, MD   buPROPion (WELLBUTRIN XL) 300 mg 24 hr tablet Take 1 tablet (300 mg total) by mouth daily 8/17/22   Doug Arrieta MD   celecoxib (CeleBREX) 100 mg capsule Take 1 capsule (100 mg total) by mouth 2 (two) times a day Prn migraine  Hold toradol, indocin  8/17/22   Kana Vaz PA-C   dexamethasone (DECADRON) 2 mg tablet 1 tab qam with food prn migraine  Patient not taking: No sig reported 7/26/22   Kana Vaz PA-C   dicyclomine (BENTYL) 20 mg tablet Take 1 tablet (20 mg total) by mouth 2 (two) times a day 5/27/22   Allan Jackson MD   diphenoxylate-atropine (LOMOTIL) 2 5-0 025 mg per tablet 1 tablet 4 times daily as needed for diarrhea 5/23/22   [facility] Consuelo, DO   escitalopram (LEXAPRO) 20 mg tablet Take 1 tablet (20 mg total) by mouth in the morning  5/18/22   Doug Arrieta MD   etonogestrel-ethinyl estradiol (NuvaRing) 0 12-0 015 MG/24HR vaginal ring Insert ring for 21 days then remove for one week   7/18/22 7/22/23  Ann Crandlal MD   indomethacin (INDOCIN) 25 mg capsule 1 tab BID prn severe headache with food  Hold toradol  7/29/22   Vivek Jansen PA-C   ketorolac (TORADOL) 10 mg tablet Take 1 tablet (10 mg total) by mouth every 6 (six) hours as needed (migraine) Max 2-3 per week  3/22/22   Roro Claros PA-C   magnesium Oxide (MAG-OX) 400 mg TABS Take 1 tablet (400 mg total) by mouth daily 8/7/22 9/6/22  Elmo Herron MD   meclizine (ANTIVERT) 12 5 MG tablet Take 1 tablet (12 5 mg total) by mouth 3 (three) times a day as needed for dizziness 8/3/22   Vivek Jansen PA-C   metFORMIN (GLUCOPHAGE-XR) 500 mg 24 hr tablet Take 2 tablets (1,000 mg total) by mouth daily with breakfast 5/10/22   Stan Del Castillo DO   metoclopramide (Reglan) 10 mg tablet Take 1 tablet (10 mg total) by mouth every 6 (six) hours  Patient not taking: No sig reported 5/21/22   Cain Estrella DO   OLANZapine (ZyPREXA) 2 5 mg tablet 1-2 tabs qhs prn migraine  Take 8-12 hours  from Abilify  Do not take with reglan  8/17/22   Vivek Jansen PA-C   olmesartan (BENICAR) 20 mg tablet Take 1 tablet (20 mg total) by mouth in the morning  5/23/22   Stan Del Castillo DO   onabotulinumtoxin A (BOTOX) 100 units Inject as directed 8/29/17   Historical Provider, MD   ondansetron (ZOFRAN) 4 mg tablet Take 1 tablet (4 mg total) by mouth every 6 (six) hours 5/27/22   Alessandro Solis MD   pantoprazole (PROTONIX) 40 mg tablet Take 1 tablet (40 mg total) by mouth daily 5/27/22   Alessandro Solis MD   prazosin (MINIPRESS) 1 mg capsule Take 1 capsule (1 mg total) by mouth daily at bedtime 7/29/22   Gilmar Denney MD   Riboflavin 400 MG CAPS Take 1 capsule (400 mg total) by mouth in the morning 8/7/22 9/6/22  Elmo Herron MD   Rimegepant Sulfate (NURTEC) 75 MG TBDP 1 tab at migraine onset  No more than one dose per 24 hours  Hold triptan   8/10/22   Vivek Jansen PA-C   Syringe/Needle, Disp, (SYRINGE 3CC/71SM5-2/4") 27G X 1-1/4" 3 ML MISC Use for IM injection of ketorolac  6/24/19   Vivek Jansen PA-C         VTE Pharmacologic Prophylaxis: Enoxaparin (Lovenox)  VTE Mechanical Prophylaxis: sequential compression device    ==  Annetta Never, DO   [address] Neurology Residency, PGY-2

## 2022-08-24 NOTE — UTILIZATION REVIEW
Inpatient Admission Authorization Request   NOTIFICATION OF INPATIENT ADMISSION/INPATIENT AUTHORIZATION REQUEST   SERVICING FACILITY:   [facility]  Address: [address] [place] [address], [address] [place] [address]  Tax ID: [id number]  NPI: [id number]  Place of Service: Inpatient [address] [facility] Code: 24     ATTENDING PROVIDER:  Attending Name and NPI#: Moise Tapia Md [[id number]]  Address: [address] [place] [address], [address] [place] [address]  Phone: [phone]     UTILIZATION REVIEW CONTACT:  Brianna Ingram, Utilization   Network Utilization Review Department  Phone: [phone]  Fax: [phone]  Email: [email or web address]     PHYSICIAN ADVISORY SERVICES:  FOR [id number] REVIEW - MEDICAL NECESSITY DENIAL  Phone: [phone]  Fax: [phone]  Email: [email or web address]     TYPE OF REQUEST:  Inpatient Status     ADMISSION INFORMATION:  ADMISSION DATE/TIME: 8/23/22  2:47 PM  PATIENT DIAGNOSIS CODE/DESCRIPTION:  Migraine [G43 [id number]]  DISCHARGE DATE/TIME: No discharge date for patient encounter  IMPORTANT INFORMATION:  Please contact Brianna Ingram directly with any questions or concerns regarding this request  Department voicemails are confidential     Send requests for admission clinical reviews, concurrent reviews, approvals, and administrative denials due to lack of clinical to fax [phone]

## 2022-08-24 NOTE — PLAN OF CARE
Problem: PAIN - ADULT  Goal: Verbalizes/displays adequate comfort level or baseline comfort level  Description: Interventions:  - Encourage patient to monitor pain and request assistance  - Assess pain using appropriate pain scale  - Administer analgesics based on type and severity of pain and evaluate response  - Implement non-pharmacological measures as appropriate and evaluate response  - Consider cultural and social influences on pain and pain management  - Notify physician/advanced practitioner if interventions unsuccessful or patient reports new pain  Outcome: Progressing     Problem: INFECTION - ADULT  Goal: Absence or prevention of progression during hospitalization  Description: INTERVENTIONS:  - Assess and monitor for signs and symptoms of infection  - Monitor lab/diagnostic results  - Monitor all insertion sites, i e  indwelling lines, tubes, and drains  - Monitor endotracheal if appropriate and nasal secretions for changes in amount and color  - [place] appropriate cooling/warming therapies per order  - Administer medications as ordered  - Instruct and encourage patient and family to use good hand hygiene technique  - Identify and instruct in appropriate isolation precautions for identified infection/condition  Outcome: Progressing  Goal: Absence of fever/infection during neutropenic period  Description: INTERVENTIONS:  - Monitor WBC    Outcome: Progressing     Problem: SAFETY ADULT  Goal: Patient will remain free of falls  Description: INTERVENTIONS:  - Educate patient/family on patient safety including physical limitations  - Instruct patient to call for assistance with activity   - Consult OT/PT to assist with strengthening/mobility   - Keep Call bell within reach  - Keep bed low and locked with side rails adjusted as appropriate  - Keep care items and personal belongings within reach  - Initiate and maintain comfort rounds  - Make Fall Risk Sign visible to staff  - Offer Toileting every  Hours, in advance of need  - Initiate/Maintain alarm  - Obtain necessary fall risk management equipment:   - Apply yellow socks and bracelet for high fall risk patients  - Consider moving patient to room near nurses station  Outcome: Progressing  Goal: Maintain or return to baseline ADL function  Description: INTERVENTIONS:  -  Assess patient's ability to carry out ADLs; assess patient's baseline for ADL function and identify physical deficits which impact ability to perform ADLs (bathing, care of mouth/teeth, toileting, grooming, dressing, etc )  - Assess/evaluate cause of self-care deficits   - Assess range of motion  - Assess patient's mobility; develop plan if impaired  - Assess patient's need for assistive devices and provide as appropriate  - Encourage maximum independence but intervene and supervise when necessary  - Involve family in performance of ADLs  - Assess for home care needs following discharge   - Consider OT consult to assist with ADL evaluation and planning for discharge  - Provide patient education as appropriate  Outcome: Progressing  Goal: Maintains/Returns to pre admission functional level  Description: INTERVENTIONS:  - Perform BMAT or MOVE assessment daily    - Set and communicate daily mobility goal to care team and patient/family/caregiver  - Collaborate with rehabilitation services on mobility goals if consulted  - Perform Range of Motion  times a day  - Reposition patient every  hours    - Dangle patient  times a day  - Stand patient  times a day  - Ambulate patient  times a day  - Out of bed to chair  times a day   - Out of bed for meals  times a day  - Out of bed for toileting  - Record patient progress and toleration of activity level   Outcome: Progressing     Problem: SAFETY ADULT  Goal: Patient will remain free of falls  Description: INTERVENTIONS:  - Educate patient/family on patient safety including physical limitations  - Instruct patient to call for assistance with activity   - Consult OT/PT to assist with strengthening/mobility   - Keep Call bell within reach  - Keep bed low and locked with side rails adjusted as appropriate  - Keep care items and personal belongings within reach  - Initiate and maintain comfort rounds  - Make Fall Risk Sign visible to staff  - Offer Toileting every  Hours, in advance of need  - Initiate/Maintain alarm  - Obtain necessary fall risk management equipment:   - Apply yellow socks and bracelet for high fall risk patients  - Consider moving patient to room near nurses station  Outcome: Progressing  Goal: Maintain or return to baseline ADL function  Description: INTERVENTIONS:  -  Assess patient's ability to carry out ADLs; assess patient's baseline for ADL function and identify physical deficits which impact ability to perform ADLs (bathing, care of mouth/teeth, toileting, grooming, dressing, etc )  - Assess/evaluate cause of self-care deficits   - Assess range of motion  - Assess patient's mobility; develop plan if impaired  - Assess patient's need for assistive devices and provide as appropriate  - Encourage maximum independence but intervene and supervise when necessary  - Involve family in performance of ADLs  - Assess for home care needs following discharge   - Consider OT consult to assist with ADL evaluation and planning for discharge  - Provide patient education as appropriate  Outcome: Progressing  Goal: Maintains/Returns to pre admission functional level  Description: INTERVENTIONS:  - Perform BMAT or MOVE assessment daily    - Set and communicate daily mobility goal to care team and patient/family/caregiver  - Collaborate with rehabilitation services on mobility goals if consulted  - Perform Range of Motion  times a day  - Reposition patient every  hours    - Dangle patient  times a day  - Stand patient  times a day  - Ambulate patient  times a day  - Out of bed to chair  times a day   - Out of bed for meals  times a day  - Out of bed for toileting  - Record patient progress and toleration of activity level   Outcome: Progressing     Problem: DISCHARGE PLANNING  Goal: Discharge to home or other facility with appropriate resources  Description: INTERVENTIONS:  - Identify barriers to discharge w/patient and caregiver  - Arrange for needed discharge resources and transportation as appropriate  - Identify discharge learning needs (meds, wound care, etc )  - Arrange for interpretive services to assist at discharge as needed  - Refer to Case Management Department for coordinating discharge planning if the patient needs post-hospital services based on physician/advanced practitioner order or complex needs related to functional status, cognitive ability, or social support system  Outcome: Progressing     Problem: Knowledge Deficit  Goal: Patient/family/caregiver demonstrates understanding of disease process, treatment plan, medications, and discharge instructions  Description: Complete learning assessment and assess knowledge base    Interventions:  - Provide teaching at level of understanding  - Provide teaching via preferred learning methods  Outcome: Progressing     Problem: MOBILITY - ADULT  Goal: Maintain or return to baseline ADL function  Description: INTERVENTIONS:  -  Assess patient's ability to carry out ADLs; assess patient's baseline for ADL function and identify physical deficits which impact ability to perform ADLs (bathing, care of mouth/teeth, toileting, grooming, dressing, etc )  - Assess/evaluate cause of self-care deficits   - Assess range of motion  - Assess patient's mobility; develop plan if impaired  - Assess patient's need for assistive devices and provide as appropriate  - Encourage maximum independence but intervene and supervise when necessary  - Involve family in performance of ADLs  - Assess for home care needs following discharge   - Consider OT consult to assist with ADL evaluation and planning for discharge  - Provide patient education as appropriate  Outcome: Progressing  Goal: Maintains/Returns to pre admission functional level  Description: INTERVENTIONS:  - Perform BMAT or MOVE assessment daily    - Set and communicate daily mobility goal to care team and patient/family/caregiver  - Collaborate with rehabilitation services on mobility goals if consulted  - Perform Range of Motion  times a day  - Reposition patient every  hours    - Dangle patient  times a day  - Stand patient  times a day  - Ambulate patient  times a day  - Out of bed to chair  times a day   - Out of bed for meals times a day  - Out of bed for toileting  - Record patient progress and toleration of activity level   Outcome: Progressing

## 2022-08-24 NOTE — DISCHARGE SUMMARY
NEUROLOGY RESIDENCY - DISCHARGE SUMMARY     Name: Aliyah Reid   Age & Sex: 33 y o  female   MRN: [id number]  Unit/Bed#: [facility] 609-01   Encounter: [id number]    Discharging Resident Physician: Sonal Reilly  Attending: Carolyn Lemons MD  PCP: Rashmi Montalvo DO  Admission Date: 8/23/2022  Discharge Date: 08/24/22    {Neurology Follow Up:18668} *** This should be completed prior to removing patient from list or discharge     ASSESSMENT & PLAN     Prediabetes  Assessment & Plan  Home PO meds: Metformin 500 mg daily- prior   Patient reports not using any anti hyperglycemic meds    Plan:   Monitor BMP      Hypertension  Assessment & Plan  · Cozaar 50 mg QD  · Monitor HD closely    Depression, major, recurrent, moderate (HCC)  Assessment & Plan  · Continue home Lexapro, bupropion, Abilify    * Intractable chronic migraine without aura and with status migrainosus  Assessment & Plan  34 yo F w/ a PMH of  inflammatory spondylopathy, fibromyalgia, costochondritis, and migraines coming in for Intractable chronic migraine  Previously tried meds  · Tylenol, Decadron, sumatriptan, Maxalt, Toradol, indomethacin, Reglan, naproxen, olanzapine, Nurtec  · Botox was last injected 7/26/2022  Also takes aimovig monthly   · Trigger point injections were performed for the migraine headache on 8/3/2022  · Nurtec was approved and she tried that but it did not help  She was given meclizine, Toradol injection, Diamox, all did not help  · She has side effects to Depakote        W/U:  · Urine HCG: negative (08/16/22)  · CTH (7/26): unremarkable  · MRI brain (9/2020): unremarkable    Plan:   · MRI brain w and wo contrast ordered  · F/u labwork   · Current regimen:  · Toradol 30 mg IM q8h, Benadryl 25 mg IV q8h, Reglan 10 mg IV q8h, Mag Sulfate IV 2g q24h  · Solumedrol 500 mg IV q24h  · If no improvement this, will consider DHE  · NS at 75 cc/hr  · monitor hemodynamics, temp curves closely                Disposition: {Disposition Home Discharge:82756}    Reason for Admission:     Kelby Augustine is a 33 y o  female  with pertinent history of migraines, anxiety, depression, prediabetes, fibromyalgia, HTN, IBS, obesity, B12 deficiency, who presents as a direct admit for ongoing migraine  Current migraine began 5 weeks ago and is described as a pulsating feeling on the left side, occasionally radiating down her neck (neck pain new)  Associated with photophobia, nausea, drunk feeling (new symptom)  The drunk feeling has occurred twice, 1st time being the day of her Botox injection outpatient immediately after she drove home  Patient seen in the ED on 8/22/22 for the migraine  Consultations During Hospital Stay:  · ***    Procedures Performed:     · ***    Significant Findings / Test Results:     · MRI: No acute intracranial abnormality  Incidental Findings:   · ***     Test Results Pending at Discharge (will require follow up):   · ***     Outpatient Tests Requested:  · ***    Complications:  ***    Hospital Course:     Kelby Augustine is a 33 y o  female patient with PMH of migraines, anxiety, depression, prediabetes, fibromyalgia, HTN, IBS, obesity, and B12 deficiency who originally presented to the hospital on 8/23/2022 due to migraine that has been ongoing for 5 weeks  The migraine is described as a pulsating feeling on the left side, occasionally radiating down her neck  Associated with photophobia, nausea, and a drunk feeling (new symptom)  MRI on 8/23 was negative for acute intracranial abnormality  For headache, pt was given Toradol 30 mg IM q8h, Benadryl 25 mg IV q8h, Reglan 10 mg IV q8h, Mag Sulfate IV 2g q24h, and Solumedrol 500 mg IV q24h, along with NS at 75 cc/hr  On 8/24, headache was still refractory, and pt was given DHE      Condition at Discharge: {condition:98021}     Discharge Day Visit / Exam:     Subjective:  ***    Vitals: Blood Pressure: 124/80 (08/24/22 0753)  Pulse: 87 (08/24/22 0753)  Temperature: 98 1 °F (36 7 °C) (08/24/22 0753)  Respirations: 18 (08/24/22 0753)  Height: 5' 3" (160 cm) (08/23/22 1755)  Weight - Scale: 107 kg (235 lb) (08/23/22 1757)  SpO2: 96 % (08/24/22 0800)    Exam:     Physical Exam    Neurologic Exam    ( *** Be Sure to Include Physical Exam: Delete this entire line when you have entered your exam)  Discussion with Family: ***    Discharge instructions/Information to patient and family:   See after visit summary for information provided to patient and family  Provisions for Follow-Up Care:  See after visit summary for information related to follow-up care and any pertinent home health orders  Planned Readmission: ***    Discharge Statement:  I spent *** minutes discharging the patient  This time was spent on the day of discharge  I had direct contact with the patient on the day of discharge  Greater than 50% of the total time was spent examining patient, answering all patient questions, arranging and discussing plan of care with patient as well as directly providing post-discharge instructions  Additional time then spent on discharge activities  Discharge Medications:  See after visit summary for reconciled discharge medications provided to patient and family        ** Please Note: This note has been constructed using a voice recognition system **      ==  [facility] Neurology Residency, PGY-***

## 2022-08-25 VITALS
DIASTOLIC BLOOD PRESSURE: 93 MMHG | HEIGHT: 63 IN | RESPIRATION RATE: 19 BRPM | WEIGHT: 235 LBS | TEMPERATURE: 98.3 F | BODY MASS INDEX: 41.64 KG/M2 | OXYGEN SATURATION: 96 % | HEART RATE: 82 BPM | SYSTOLIC BLOOD PRESSURE: 132 MMHG

## 2022-08-25 RX ORDER — DIHYDROERGOTAMINE MESYLATE 1 MG/ML
1 INJECTION, SOLUTION INTRAMUSCULAR; INTRAVENOUS; SUBCUTANEOUS ONCE
Status: COMPLETED | OUTPATIENT
Start: 2022-08-25 | End: 2022-08-25

## 2022-08-25 RX ADMIN — KETOROLAC TROMETHAMINE 30 MG: 30 INJECTION, SOLUTION INTRAMUSCULAR; INTRAVENOUS at 09:34

## 2022-08-25 RX ADMIN — METOCLOPRAMIDE HYDROCHLORIDE 10 MG: 5 INJECTION INTRAMUSCULAR; INTRAVENOUS at 00:42

## 2022-08-25 RX ADMIN — DIHYDROERGOTAMINE MESYLATE 1 MG: 1 INJECTION, SOLUTION INTRAMUSCULAR; INTRAVENOUS; SUBCUTANEOUS at 11:19

## 2022-08-25 RX ADMIN — DIPHENHYDRAMINE HYDROCHLORIDE 25 MG: 50 INJECTION, SOLUTION INTRAMUSCULAR; INTRAVENOUS at 11:16

## 2022-08-25 RX ADMIN — DIPHENHYDRAMINE HYDROCHLORIDE 25 MG: 50 INJECTION, SOLUTION INTRAMUSCULAR; INTRAVENOUS at 00:42

## 2022-08-25 RX ADMIN — PANTOPRAZOLE SODIUM 40 MG: 40 TABLET, DELAYED RELEASE ORAL at 09:34

## 2022-08-25 RX ADMIN — KETOROLAC TROMETHAMINE 30 MG: 30 INJECTION, SOLUTION INTRAMUSCULAR; INTRAVENOUS at 00:42

## 2022-08-25 RX ADMIN — DICYCLOMINE HYDROCHLORIDE 20 MG: 20 TABLET ORAL at 09:34

## 2022-08-25 RX ADMIN — SODIUM CHLORIDE 75 ML/HR: 0.9 INJECTION, SOLUTION INTRAVENOUS at 05:54

## 2022-08-25 RX ADMIN — LOSARTAN POTASSIUM 50 MG: 50 TABLET, FILM COATED ORAL at 09:34

## 2022-08-25 RX ADMIN — METOCLOPRAMIDE HYDROCHLORIDE 10 MG: 5 INJECTION INTRAMUSCULAR; INTRAVENOUS at 09:34

## 2022-08-25 NOTE — PLAN OF CARE
Problem: PAIN - ADULT  Goal: Verbalizes/displays adequate comfort level or baseline comfort level  Description: Interventions:  - Encourage patient to monitor pain and request assistance  - Assess pain using appropriate pain scale  - Administer analgesics based on type and severity of pain and evaluate response  - Implement non-pharmacological measures as appropriate and evaluate response  - Consider cultural and social influences on pain and pain management  - Notify physician/advanced practitioner if interventions unsuccessful or patient reports new pain  Outcome: Progressing     Problem: INFECTION - ADULT  Goal: Absence or prevention of progression during hospitalization  Description: INTERVENTIONS:  - Assess and monitor for signs and symptoms of infection  - Monitor lab/diagnostic results  - Monitor all insertion sites, i e  indwelling lines, tubes, and drains  - Monitor endotracheal if appropriate and nasal secretions for changes in amount and color  - [place] appropriate cooling/warming therapies per order  - Administer medications as ordered  - Instruct and encourage patient and family to use good hand hygiene technique  - Identify and instruct in appropriate isolation precautions for identified infection/condition  Outcome: Progressing  Goal: Absence of fever/infection during neutropenic period  Description: INTERVENTIONS:  - Monitor WBC    Outcome: Progressing     Problem: SAFETY ADULT  Goal: Patient will remain free of falls  Description: INTERVENTIONS:  - Educate patient/family on patient safety including physical limitations  - Instruct patient to call for assistance with activity   - Consult OT/PT to assist with strengthening/mobility   - Keep Call bell within reach  - Keep bed low and locked with side rails adjusted as appropriate  - Keep care items and personal belongings within reach  - Initiate and maintain comfort rounds  - Make Fall Risk Sign visible to staff  - Offer Toileting every  Hours, in advance of need  - Initiate/Maintain alarm  - Obtain necessary fall risk management equipment:   - Apply yellow socks and bracelet for high fall risk patients  - Consider moving patient to room near nurses station  Outcome: Progressing  Goal: Maintain or return to baseline ADL function  Description: INTERVENTIONS:  -  Assess patient's ability to carry out ADLs; assess patient's baseline for ADL function and identify physical deficits which impact ability to perform ADLs (bathing, care of mouth/teeth, toileting, grooming, dressing, etc )  - Assess/evaluate cause of self-care deficits   - Assess range of motion  - Assess patient's mobility; develop plan if impaired  - Assess patient's need for assistive devices and provide as appropriate  - Encourage maximum independence but intervene and supervise when necessary  - Involve family in performance of ADLs  - Assess for home care needs following discharge   - Consider OT consult to assist with ADL evaluation and planning for discharge  - Provide patient education as appropriate  Outcome: Progressing  Goal: Maintains/Returns to pre admission functional level  Description: INTERVENTIONS:  - Perform BMAT or MOVE assessment daily    - Set and communicate daily mobility goal to care team and patient/family/caregiver  - Collaborate with rehabilitation services on mobility goals if consulted  - Perform Range of Motion  times a day  - Reposition patient every  hours    - Dangle patient  times a day  - Stand patient  times a day  - Ambulate patient  times a day  - Out of bed to chair  times a day   - Out of bed for meals  times a day  - Out of bed for toileting  - Record patient progress and toleration of activity level   Outcome: Progressing     Problem: DISCHARGE PLANNING  Goal: Discharge to home or other facility with appropriate resources  Description: INTERVENTIONS:  - Identify barriers to discharge w/patient and caregiver  - Arrange for needed discharge resources and transportation as appropriate  - Identify discharge learning needs (meds, wound care, etc )  - Arrange for interpretive services to assist at discharge as needed  - Refer to Case Management Department for coordinating discharge planning if the patient needs post-hospital services based on physician/advanced practitioner order or complex needs related to functional status, cognitive ability, or social support system  Outcome: Progressing     Problem: Knowledge Deficit  Goal: Patient/family/caregiver demonstrates understanding of disease process, treatment plan, medications, and discharge instructions  Description: Complete learning assessment and assess knowledge base  Interventions:  - Provide teaching at level of understanding  - Provide teaching via preferred learning methods  Outcome: Progressing     Problem: MOBILITY - ADULT  Goal: Maintain or return to baseline ADL function  Description: INTERVENTIONS:  -  Assess patient's ability to carry out ADLs; assess patient's baseline for ADL function and identify physical deficits which impact ability to perform ADLs (bathing, care of mouth/teeth, toileting, grooming, dressing, etc )  - Assess/evaluate cause of self-care deficits   - Assess range of motion  - Assess patient's mobility; develop plan if impaired  - Assess patient's need for assistive devices and provide as appropriate  - Encourage maximum independence but intervene and supervise when necessary  - Involve family in performance of ADLs  - Assess for home care needs following discharge   - Consider OT consult to assist with ADL evaluation and planning for discharge  - Provide patient education as appropriate  Outcome: Progressing  Goal: Maintains/Returns to pre admission functional level  Description: INTERVENTIONS:  - Perform BMAT or MOVE assessment daily    - Set and communicate daily mobility goal to care team and patient/family/caregiver     - Collaborate with rehabilitation services on mobility goals if consulted  - Perform Range of Motion  times a day  - Reposition patient every  hours    - Dangle patient  times a day  - Stand patient  times a day  - Ambulate patient  times a day  - Out of bed to chair  times a day   - Out of bed for meals  times a day  - Out of bed for toileting  - Record patient progress and toleration of activity level   Outcome: Progressing

## 2022-08-25 NOTE — DISCHARGE SUMMARY
NEUROLOGY RESIDENCY - DISCHARGE SUMMARY     Name: Callie Corley   Age & Sex: 33 y o  female   MRN: [id number]  Unit/Bed#: [facility] 916-30   Encounter: [id number]    Discharging Resident Physician: Meghann Garcia DO  Attending: Silvestre Serrano MD  PCP: Henrietta Hernandez DO  Admission Date: 8/23/2022  Discharge Date: 08/25/22    Callie Corley will need follow up in in 6 weeks with headache AP Jacinta Fryer  She will not require outpatient neurological testing  ASSESSMENT & PLAN     * Intractable chronic migraine without aura and with status migrainosus  Assessment & Plan  32 yo F w/ a PMH of  inflammatory spondylopathy ( ankylosing spondylitis), fibromyalgia, costochondritis, and migraines coming in for Intractable chronic migraine  That has been ongoing for the past 5 weeks and is similar in characteristics as her previous migraines but is longer this time  Vapes at max 7 days a month and only when patient is having migraines otherwise not normally on vaping /smoking/marijuana use  No visual acuity or diplopia changes or caused desaturation is appreciated  She does note she has a little bit pain when moving her eyes side to side  She denies any usage of hypervitaminosis or any other vitamins besides the magnesium that she is prescribed for her migraines  She is currently on NuvaRing for  Contraception  Noted that migraine cocktail that she normally gets in the hospital that she is currently getting usually helps    08/25: improved migraine at 1/10 and head some belly ache after DHE yesterday after 10-15 minutes that resolved    Previously tried meds  · Tylenol, Decadron, sumatriptan, Maxalt, Toradol, indomethacin, Reglan, naproxen, olanzapine, Nurtec  · Botox was last injected 7/26/2022  Also takes aimovig monthly   · Trigger point injections were performed for the migraine headache on 8/3/2022  · Nurtec was approved and she tried that but it did not help    She was given meclizine, Toradol injection, Diamox, all did not help  · She has side effects to Depakote  W/U:  MRI brain w/ and w/o contrast unremarkable  ESR: 44   serum HCG negative  · Urine HCG: negative (08/16/22)  · CTH (7/26): unremarkable  · MRI brain (9/2020): unremarkable    Plan:   · MRI brain  unremarkable  · Current regimen:  · Day 3 of Toradol 30 mg IM q8h, Benadryl 25 mg IV q8h, Reglan 10 mg IV q8h, Mag Sulfate IV 2g q24h  · Day 3 of Solumedrol 500 mg IV q24h x3 days  · Got 0 5mg of DHE and had vast improvement of migraine to 1/10 08/24  Got additional 1mg of DHE today and will be d/c today with close f/u w/ IshanMarylin Perez   Discussed importance of hydration as well as ensuring she gets adequate sleep to help decrease frequency of her migraines      Prediabetes  Assessment & Plan  Home PO meds: Metformin 500 mg daily- prior   Patient reports not using any anti hyperglycemic meds    Plan:   Monitor BMP      Hypertension  Assessment & Plan  · Cozaar 50 mg QD  · Monitor HD closely    Depression, major, recurrent, moderate (HCC)  Assessment & Plan  · Continue home Lexapro, bupropion, Abilify           Disposition:     Home    Reason for Admission:     Vikram Guzman is a 33 y o  female  with pertinent history of migraines, anxiety, depression, prediabetes, fibromyalgia, HTN, IBS, obesity, B12 deficiency, who presents as a direct admit for ongoing migraine  Current migraine began 5 weeks ago and is described as a pulsating feeling on the left side, occasionally radiating down her neck (neck pain new)  Associated with photophobia, nausea, drunk feeling (new symptom)  The drunk feeling has occurred twice, 1st time being the day of her Botox injection outpatient immediately after she drove home  Patient seen in the ED on 8/22/22 for the migraine  Consultations During Hospital Stay:  · None    Procedures Performed:     · None    Significant Findings / Test Results:     · MRI: No acute intracranial abnormality      Incidental Findings:   · None     Test Results Pending at Discharge (will require follow up): · None     Outpatient Tests Requested:  · None    Complications:  None    Hospital Course:     Arcelia Bloom is a 33 y o  female patient with PMH of migraines, anxiety, depression, prediabetes, fibromyalgia, HTN, IBS, obesity, and B12 deficiency who originally presented to the hospital on 8/23/2022 due to migraine that has been ongoing for 5 weeks  The migraine is described as a pulsating feeling on the left side, occasionally radiating down her neck  Associated with photophobia, nausea, and a drunk feeling (new symptom)  MRI on 8/23 was negative for acute intracranial abnormality  For headache, pt was given Toradol 30 mg IM q8h, Benadryl 25 mg IV q8h, Reglan 10 mg IV q8h, Mag Sulfate IV 2g q24h, and Solumedrol 500 mg IV q24h, along with NS at 75 cc/hr  On 8/24, headache was still refractory, and pt was given DHE 0 5mg and migraine improved to 1/10 and she felt much better and had a mild belly ache  She was given reglan and benadryl prior  She got additional DHE 1mg day of discharge to try and resolve migraine and is agreeable to getting discharged today and f/u with [facility]    Condition at Discharge: good     Discharge Day Visit / Exam:     Subjective:  No complaints this AM  She stated her migraine improved drastically with the DHE and is agreeable to getting an additional dose today prior to d/c  She mentioned a mild belly ache for 10-15 minutes that was self-resolving but tolerable       She denies any fevers/chill, chest pain, shortness of breath,  nausea, vomiting, diarrhea, problems urinating, problems  With bowel movements, and is eating/drinking without problem     She denies any headaches, syncope, paresthesias, diplopia, visual changes, tinnitus, sudden onset weakness, garbled speech, dysarthria/slurring of words,  Loss of bowel or bladder        Vitals: Blood Pressure: 127/80 (08/25/22 0825)  Pulse: 75 (08/25/22 1043)  Temperature: 98 6 °F (37 °C) (08/25/22 0825)  Temp Source: Oral (08/25/22 0249)  Respirations: 18 (08/25/22 0249)  Height: 5' 3" (160 cm) (08/23/22 1755)  Weight - Scale: 107 kg (235 lb) (08/23/22 1757)  SpO2: 96 % (08/25/22 1043)    Exam:     Physical Exam  Vitals and nursing note reviewed  Constitutional:       General: She is not in acute distress  Appearance: She is well-developed  HENT:      Head: Normocephalic and atraumatic  Eyes:      Extraocular Movements: EOM normal       Conjunctiva/sclera: Conjunctivae normal       Pupils: Pupils are equal, round, and reactive to light  Cardiovascular:      Rate and Rhythm: Normal rate and regular rhythm  Heart sounds: No murmur heard  Pulmonary:      Effort: Pulmonary effort is normal  No respiratory distress  Breath sounds: Normal breath sounds  Abdominal:      Palpations: Abdomen is soft  Tenderness: There is no abdominal tenderness  Musculoskeletal:      Cervical back: Neck supple  Skin:     General: Skin is warm and dry  Neurological:      Mental Status: She is alert and oriented to person, place, and time  Coordination: Finger-Nose-Finger Test and Heel to [facility] Test normal       Deep Tendon Reflexes: Strength normal       Reflex Scores:       Tricep reflexes are 2+ on the right side and 2+ on the left side  Bicep reflexes are 2+ on the right side and 2+ on the left side  Brachioradialis reflexes are 2+ on the right side and 2+ on the left side  Patellar reflexes are 2+ on the right side and 2+ on the left side  Achilles reflexes are 2+ on the right side and 2+ on the left side  Neurologic Exam     Mental Status   Oriented to person, place, and time  Cranial Nerves     CN II   Visual fields full to confrontation  CN III, IV, VI   Pupils are equal, round, and reactive to light    Extraocular motions are normal    CN III: no CN III palsy  CN VI: no CN VI palsy  Nystagmus: none     CN V   Facial sensation intact  CN VII   Facial expression full, symmetric  CN VIII   CN VIII normal      CN IX, X   CN IX normal    CN X normal      CN XI   CN XI normal      CN XII   CN XII normal      Motor Exam   Right arm pronator drift: absent  Left arm pronator drift: absent    Strength   Strength 5/5 throughout  Sensory Exam   Light touch normal      Gait, Coordination, and Reflexes     Coordination   Finger to nose coordination: normal  Heel to shin coordination: normal    Tremor   Resting tremor: absent  Intention tremor: absent  Action tremor: absent    Reflexes   Right brachioradialis: 2+  Left brachioradialis: 2+  Right biceps: 2+  Left biceps: 2+  Right triceps: 2+  Left triceps: 2+  Right patellar: 2+  Left patellar: 2+  Right achilles: 2+  Left achilles: 2+  Right plantar: normal  Left plantar: normal    Gait deferred but had not trouble getting to bathroom         Discussion with Family: Patient declined and will call mother     Discharge instructions/Information to patient and family:   See after visit summary for information provided to patient and family  Provisions for Follow-Up Care:  See after visit summary for information related to follow-up care and any pertinent home health orders  Planned Readmission: None    Discharge Statement:  I spent 45 minutes discharging the patient  This time was spent on the day of discharge  I had direct contact with the patient on the day of discharge  Greater than 50% of the total time was spent examining patient, answering all patient questions, arranging and discussing plan of care with patient as well as directly providing post-discharge instructions  Additional time then spent on discharge activities  Discharge Medications:  See after visit summary for reconciled discharge medications provided to patient and family        ** Please Note: This note has been constructed using a voice recognition system **      ==  Lia Cook Collette Havers, [address] [facility] Neurology Residency, PGY-2

## 2022-08-25 NOTE — DISCHARGE INSTR - AVS FIRST PAGE
Please follow-up with your PCP in 1 week after discharge  Please come back to the ED if your symptoms severely worsen, you experience chest pain, or shortness of breath

## 2022-08-25 NOTE — PLAN OF CARE
Problem: PAIN - ADULT  Goal: Verbalizes/displays adequate comfort level or baseline comfort level  Description: Interventions:  - Encourage patient to monitor pain and request assistance  - Assess pain using appropriate pain scale  - Administer analgesics based on type and severity of pain and evaluate response  - Implement non-pharmacological measures as appropriate and evaluate response  - Consider cultural and social influences on pain and pain management  - Notify physician/advanced practitioner if interventions unsuccessful or patient reports new pain  Outcome: Progressing     Problem: INFECTION - ADULT  Goal: Absence or prevention of progression during hospitalization  Description: INTERVENTIONS:  - Assess and monitor for signs and symptoms of infection  - Monitor lab/diagnostic results  - Monitor all insertion sites, i e  indwelling lines, tubes, and drains  - Monitor endotracheal if appropriate and nasal secretions for changes in amount and color  - Jewett appropriate cooling/warming therapies per order  - Administer medications as ordered  - Instruct and encourage patient and family to use good hand hygiene technique  - Identify and instruct in appropriate isolation precautions for identified infection/condition  Outcome: Progressing  Goal: Absence of fever/infection during neutropenic period  Description: INTERVENTIONS:  - Monitor WBC    Outcome: Progressing     Problem: SAFETY ADULT  Goal: Patient will remain free of falls  Description: INTERVENTIONS:  - Educate patient/family on patient safety including physical limitations  - Instruct patient to call for assistance with activity   - Consult OT/PT to assist with strengthening/mobility   - Keep Call bell within reach  - Keep bed low and locked with side rails adjusted as appropriate  - Keep care items and personal belongings within reach  - Initiate and maintain comfort rounds  - Make Fall Risk Sign visible to staff  - Offer Toileting every    Hours, in advance of need  - Initiate/Maintain   alarm  - Obtain necessary fall risk management equipment:     - Apply yellow socks and bracelet for high fall risk patients  - Consider moving patient to room near nurses station  Outcome: Progressing  Goal: Maintain or return to baseline ADL function  Description: INTERVENTIONS:  -  Assess patient's ability to carry out ADLs; assess patient's baseline for ADL function and identify physical deficits which impact ability to perform ADLs (bathing, care of mouth/teeth, toileting, grooming, dressing, etc )  - Assess/evaluate cause of self-care deficits   - Assess range of motion  - Assess patient's mobility; develop plan if impaired  - Assess patient's need for assistive devices and provide as appropriate  - Encourage maximum independence but intervene and supervise when necessary  - Involve family in performance of ADLs  - Assess for home care needs following discharge   - Consider OT consult to assist with ADL evaluation and planning for discharge  - Provide patient education as appropriate  Outcome: Progressing  Goal: Maintains/Returns to pre admission functional level  Description: INTERVENTIONS:  - Perform BMAT or MOVE assessment daily    - Set and communicate daily mobility goal to care team and patient/family/caregiver  - Collaborate with rehabilitation services on mobility goals if consulted  - Perform Range of Motion    times a day  - Reposition patient every    hours    - Dangle patient    times a day  - Stand patient    times a day  - Ambulate patient    times a day  - Out of bed to chair    times a day   - Out of bed for meals    times a day  - Out of bed for toileting  - Record patient progress and toleration of activity level   Outcome: Progressing     Problem: DISCHARGE PLANNING  Goal: Discharge to home or other facility with appropriate resources  Description: INTERVENTIONS:  - Identify barriers to discharge w/patient and caregiver  - Arrange for needed discharge resources and transportation as appropriate  - Identify discharge learning needs (meds, wound care, etc )  - Arrange for interpretive services to assist at discharge as needed  - Refer to Case Management Department for coordinating discharge planning if the patient needs post-hospital services based on physician/advanced practitioner order or complex needs related to functional status, cognitive ability, or social support system  Outcome: Progressing     Problem: Knowledge Deficit  Goal: Patient/family/caregiver demonstrates understanding of disease process, treatment plan, medications, and discharge instructions  Description: Complete learning assessment and assess knowledge base  Interventions:  - Provide teaching at level of understanding  - Provide teaching via preferred learning methods  Outcome: Progressing     Problem: MOBILITY - ADULT  Goal: Maintain or return to baseline ADL function  Description: INTERVENTIONS:  -  Assess patient's ability to carry out ADLs; assess patient's baseline for ADL function and identify physical deficits which impact ability to perform ADLs (bathing, care of mouth/teeth, toileting, grooming, dressing, etc )  - Assess/evaluate cause of self-care deficits   - Assess range of motion  - Assess patient's mobility; develop plan if impaired  - Assess patient's need for assistive devices and provide as appropriate  - Encourage maximum independence but intervene and supervise when necessary  - Involve family in performance of ADLs  - Assess for home care needs following discharge   - Consider OT consult to assist with ADL evaluation and planning for discharge  - Provide patient education as appropriate  Outcome: Progressing  Goal: Maintains/Returns to pre admission functional level  Description: INTERVENTIONS:  - Perform BMAT or MOVE assessment daily    - Set and communicate daily mobility goal to care team and patient/family/caregiver     - Collaborate with rehabilitation services on mobility goals if consulted  - Perform Range of Motion    times a day  - Reposition patient every    hours    - Dangle patient    times a day  - Stand patient    times a day  - Ambulate patient    times a day  - Out of bed to chair    times a day   - Out of bed for meals    times a day  - Out of bed for toileting  - Record patient progress and toleration of activity level   Outcome: Progressing

## 2022-08-26 ENCOUNTER — TRANSITIONAL CARE MANAGEMENT (OUTPATIENT)
Dept: FAMILY MEDICINE CLINIC | Facility: CLINIC | Age: 32
End: 2022-08-26

## 2022-08-26 ENCOUNTER — TELEPHONE (OUTPATIENT)
Dept: NEUROLOGY | Facility: CLINIC | Age: 32
End: 2022-08-26

## 2022-08-26 NOTE — TELEPHONE ENCOUNTER
SLB/Migraines    Notes: Bertha Johnson will need follow up in in 6 weeks with headache AP Brian Roberto  She will not require outpatient neurological testing  Scheduled: 11/14/22 with Evelin Epps in [facility] for her hospital follow up  Provided appointment details   Added patient to wait list

## 2022-08-26 NOTE — UTILIZATION REVIEW
Notification of Discharge   This is a Notification of Discharge from our facility [address]  Please be advised that this patient has been discharge from our facility  Below you will find the admission and discharge date and time including the patients disposition  UTILIZATION REVIEW CONTACT:  Zohra Vick  Utilization   Network Utilization Review Department  Phone: [phone] carefully listen to the prompts  All voicemails are confidential   Email: [email or web address]     PHYSICIAN ADVISORY SERVICES:  FOR EXYO-YK-ZVLL REVIEW - MEDICAL NECESSITY DENIAL  Phone: [phone]  Fax: [phone]  Email: [email or web address]     PRESENTATION DATE: 8/23/2022  2:47 PM  OBERVATION ADMISSION DATE:  INPATIENT ADMISSION DATE: 8/23/22  2:47 PM   DISCHARGE DATE: 8/25/2022  5:08 PM  DISPOSITION: Home/Self Care Home/Self Care      IMPORTANT INFORMATION:  Send all requests for admission clinical reviews, approved or denied determinations and any other requests to dedicated fax number below belonging to the campus where the patient is receiving treatment   List of dedicated fax numbers:  [address] DENIALS (Administrative/Medical Necessity) [phone]   [address] (Maternity/NICU/Pediatrics) [phone]   Katelynn Dangelo [phone]   130 [facility] [phone]   [address] [place] [address] [phone]   2000 [facility] [address] [place] [address],[address] [phone]   [facility]  [phone]   [address] [facility]  2401 [facility] [address] [facility] [phone]

## 2022-08-30 ENCOUNTER — OFFICE VISIT (OUTPATIENT)
Dept: FAMILY MEDICINE CLINIC | Facility: CLINIC | Age: 32
End: 2022-08-30
Payer: COMMERCIAL

## 2022-08-30 ENCOUNTER — TELEMEDICINE (OUTPATIENT)
Dept: BEHAVIORAL/MENTAL HEALTH CLINIC | Facility: CLINIC | Age: 32
End: 2022-08-30
Payer: COMMERCIAL

## 2022-08-30 VITALS
OXYGEN SATURATION: 97 % | SYSTOLIC BLOOD PRESSURE: 128 MMHG | DIASTOLIC BLOOD PRESSURE: 88 MMHG | HEIGHT: 63 IN | TEMPERATURE: 98.2 F | HEART RATE: 100 BPM | BODY MASS INDEX: 42.49 KG/M2 | WEIGHT: 239.8 LBS

## 2022-08-30 DIAGNOSIS — R73.03 PREDIABETES: ICD-10-CM

## 2022-08-30 DIAGNOSIS — F41.1 GENERALIZED ANXIETY DISORDER: Chronic | ICD-10-CM

## 2022-08-30 DIAGNOSIS — J01.00 ACUTE NON-RECURRENT MAXILLARY SINUSITIS: ICD-10-CM

## 2022-08-30 DIAGNOSIS — G43.101 MIGRAINE WITH AURA AND WITH STATUS MIGRAINOSUS, NOT INTRACTABLE: Primary | ICD-10-CM

## 2022-08-30 DIAGNOSIS — F33.1 DEPRESSION, MAJOR, RECURRENT, MODERATE (HCC): Primary | Chronic | ICD-10-CM

## 2022-08-30 PROCEDURE — 99214 OFFICE O/P EST MOD 30 MIN: CPT | Performed by: FAMILY MEDICINE

## 2022-08-30 PROCEDURE — 90834 PSYTX W PT 45 MINUTES: CPT | Performed by: SOCIAL WORKER

## 2022-08-30 RX ORDER — AMOXICILLIN AND CLAVULANATE POTASSIUM 875; 125 MG/1; MG/1
1 TABLET, FILM COATED ORAL EVERY 12 HOURS SCHEDULED
Qty: 14 TABLET | Refills: 0 | Status: SHIPPED | OUTPATIENT
Start: 2022-08-30 | End: 2022-09-06

## 2022-08-30 RX ORDER — TOFACITINIB 11 MG/1
TABLET, FILM COATED, EXTENDED RELEASE ORAL
COMMUNITY
Start: 2022-08-22 | End: 2022-10-28

## 2022-08-30 NOTE — PSYCH
Virtual Regular Visit    Verification of patient location:    Patient is located in the following state in which I hold an active license [place]    Assessment/Plan:    Problem List Items Addressed This Visit        Other    Generalized anxiety disorder (Chronic)    Depression, major, recurrent, moderate (HCC) - Primary (Chronic)        Goals addressed in session: Goal 1      Reason for visit is No chief complaint on file  Encounter provider APARNA Tsai    Provider located at [address] [place] [id number] [phone]    Recent Visits  No visits were found meeting these conditions  Showing recent visits within past 7 days and meeting all other requirements  Today's Visits  Date Type Provider Dept   08/30/22 Office Visit Sanjiv Chacon DO [address] [facility] today's visits and meeting all other requirements  Future Appointments  No visits were found meeting these conditions  Showing future appointments within next 150 days and meeting all other requirements     The patient was identified by name and date of birth  Lakia Arguello was informed that this is a telemedicine visit and that [facility] and patient was informed this is a secure, HIPAA-complaint platform  She agrees to proceed     My office door was closed  The patient was notified the following individuals were present in the room: This session was attended by Jonatan Hernandez provided verbal consent for the new employee to sit in on this and future sessions  She acknowledges understanding that she can opt out of student observations/participation at any time  She acknowledged consent and understanding of privacy and security of the video platform  The patient has agreed to participate and understands they can discontinue the visit at any time      Patient is aware this is a billable service  Augustin Luna is a 33 y o  female  DATA: Met with Stephanie for scheduled individual session  Topics of discussion included family stressors, relationships with family, physical health concerns, financial issues, relationships with friends and mood regulation and symptoms  Mireille Senior discussed her physical health  She states that she had to be admitted to the hospital for 2 nights to receive DHE for relief of her intractable migraine  She reports less anxiety than she would have expected  She reports that her current pain level is approximately a 1/10  Mireille Senior discussed her family and friend relationships  She states that her mother and grandmother were very supportive of her during her hospitalization  She also reports that her uncle continues to acknowledge her in a friendly manner  Stephanie discussed her plans for the future-- e g , starting to hoop again (slowly) and engage in arts/crafts that bring her ciara  She states that the barrier to engaging in these activities is her level of anxiety about triggering another intractable migraine  This clinician encouraged her to start slowly with her activities  Mireille Senior states that she is concerned about hand tremors  She will discuss this with her doctors, if it does not subside  She states that she and her mother resubmitted her SSA application  She states, "I just can't work  Everyday there is something wrong " Client shows evidence of utilizing mindfulness-based skills to manage mental health symptoms  During this session, this clinician used the following therapeutic modalities: supportive psychotherapy, client-centered therapy, mindfulness-based strategies, DBT-informed skills, Motivational Interviewing and solution-focused therapy  ASSESSMENT: Mireille Senior presents with an improved mood  Her affect is normal range and intensity  Mireille Senior exhibits good therapeutic rapport with this clinician   Mireille Senior continues to exhibit willingness to work on treatment goals and objectives  Izabel Woodard presents with a minimal risk of suicide, minimal risk of self-harm, and minimal risk of harm to others  PLAN: Izabel Woodard will return in approximately two weeks for the next scheduled session  Between sessions, Izabel Woodard will continue to build upon her mindfulness-based strategies  She will also increase her physical health activity and will report back during the next session re: successes and barriers  At the next session, this clinician will use supportive psychotherapy, client-centered therapy, mindfulness-based strategies, DBT-informed skills, Motivational Interviewing and solution-focused therapy to address her mood regulation and relationship concerns, in an effort to assist Izabel Woodard with meeting treatment goals  HPI     Past Medical History:   Diagnosis Date    Abnormal Pap smear of cervix     Allergic     Anxiety     Arthritis     Depression     Diabetes mellitus (HCC)     Fibromyalgia, primary     Hypertension     IBS (irritable bowel syndrome)     Migraine     Obesity     Vitamin B12 deficiency        Past Surgical History:   Procedure Laterality Date    COLONOSCOPY N/A 5/8/2018    Procedure: COLONOSCOPY;  Surgeon: Sonu Barragan MD;  Location: [facility] GI LAB; Service: Gastroenterology    ND EXC SKIN BENIG >4 CM REMAINDR BODY N/A 7/1/2021    Procedure: EXCISION OF PILAR SCALP CYST X 2 AND RIGHT INNER GROIN NEVVUS;  Surgeon: Zaria Mendoza MD;  Location: [facility] MAIN OR;  Service: Plastics    ND RECMPL WND SCALP,EXTR 2 6-7 5 CM N/A 7/1/2021    Procedure: CLOSURE WOUND SCALP X 2 AND RIGHT INNER GROIN;  Surgeon: Zaria Mendoza MD;  Location:  MAIN OR;  Service: Plastics    TONSILLECTOMY      WISDOM TOOTH EXTRACTION         Current Outpatient Medications   Medication Sig Dispense Refill    acetaZOLAMIDE (DIAMOX) 250 mg tablet 1 tab BID x 1week, will increase further if needed   30 tablet 0    Aimovig 140 MG/ML SOAJ Inject 140mg under the skin every 30 (thirty) days  1 mL 10    ALPRAZolam (XANAX) 0 5 mg tablet Take 1 tablet (0 5 mg total) by mouth as needed in the morning and 1 tablet (0 5 mg total) as needed in the evening for anxiety  60 tablet 2    amoxicillin-clavulanate (AUGMENTIN) 875-125 mg per tablet Take 1 tablet by mouth every 12 (twelve) hours for 7 days 14 tablet 0    ARIPiprazole (ABILIFY) 2 mg tablet Take 1 tablet (2 mg total) by mouth daily 30 tablet 2    B-D 3CC LUER-DEANN SYR 25GX1" 25G X 1" 3 ML MISC        buPROPion (WELLBUTRIN XL) 300 mg 24 hr tablet Take 1 tablet (300 mg total) by mouth daily 90 tablet 0    celecoxib (CeleBREX) 100 mg capsule Take 1 capsule (100 mg total) by mouth 2 (two) times a day Prn migraine  Hold toradol, indocin  20 capsule 0    dicyclomine (BENTYL) 20 mg tablet Take 1 tablet (20 mg total) by mouth 2 (two) times a day 60 tablet 1    diphenoxylate-atropine (LOMOTIL) 2 5-0 025 mg per tablet 1 tablet 4 times daily as needed for diarrhea 30 tablet 1    escitalopram (LEXAPRO) 20 mg tablet Take 1 tablet (20 mg total) by mouth in the morning  30 tablet 2    etonogestrel-ethinyl estradiol (NuvaRing) 0 12-0 015 MG/24HR vaginal ring Insert ring for 21 days then remove for one week  3 each 0    indomethacin (INDOCIN) 25 mg capsule 1 tab BID prn severe headache with food  Hold toradol  30 capsule 0    ketorolac (TORADOL) 10 mg tablet Take 1 tablet (10 mg total) by mouth every 6 (six) hours as needed (migraine) Max 2-3 per week  10 tablet 0    magnesium Oxide (MAG-OX) 400 mg TABS Take 1 tablet (400 mg total) by mouth daily 30 tablet 0    meclizine (ANTIVERT) 12 5 MG tablet Take 1 tablet (12 5 mg total) by mouth 3 (three) times a day as needed for dizziness 30 tablet 0    metFORMIN (GLUCOPHAGE-XR) 500 mg 24 hr tablet Take 2 tablets (1,000 mg total) by mouth daily with breakfast 60 tablet 2    OLANZapine (ZyPREXA) 2 5 mg tablet 1-2 tabs qhs prn migraine  Take 8-12 hours  from Abilify   Do not take with reglan  10 tablet 0    olmesartan (BENICAR) 20 mg tablet Take 1 tablet (20 mg total) by mouth in the morning  30 tablet 2    onabotulinumtoxin A (BOTOX) 100 units Inject as directed      ondansetron (ZOFRAN) 4 mg tablet Take 1 tablet (4 mg total) by mouth every 6 (six) hours 30 tablet 2    pantoprazole (PROTONIX) 40 mg tablet Take 1 tablet (40 mg total) by mouth daily 30 tablet 3    prazosin (MINIPRESS) 1 mg capsule Take 1 capsule (1 mg total) by mouth daily at bedtime 30 capsule 0    Riboflavin 400 MG CAPS Take 1 capsule (400 mg total) by mouth in the morning 30 capsule 0    Rimegepant Sulfate (NURTEC) 75 MG TBDP 1 tab at migraine onset  No more than one dose per 24 hours  Hold triptan  8 tablet 5    Syringe/Needle, Disp, (SYRINGE 3CC/65AP5-1/4") 27G X 1-1/4" 3 ML MISC Use for IM injection of ketorolac  4 each 0    Xeljanz XR 11 MG TB24        Current Facility-Administered Medications   Medication Dose Route Frequency Provider Last Rate Last Admin    cyanocobalamin injection 1,000 mcg  1,000 mcg Intramuscular Q30 Days Leita Caliente, DO   1,000 mcg at 08/03/20 5033    cyanocobalamin injection 1,000 mcg  1,000 mcg Intramuscular Q14 Days Leita Caliente, DO   1,000 mcg at 05/18/20 1146    cyanocobalamin injection 1,000 mcg  1,000 mcg Intramuscular Q30 Days Leita Caliente, DO   1,000 mcg at 09/01/20 1129        Allergies   Allergen Reactions    Cimzia [Certolizumab Pegol] Swelling    Desvenlafaxine      Other reaction(s): state of confusion    Ixekizumab Vomiting    Valproic Acid Other (See Comments)     Other reaction(s): dilated pupils, "schizi"    Tizanidine Anxiety       Review of Systems    Video Exam    There were no vitals filed for this visit      Physical Exam     I spent 50 minutes directly with the patient during this visit     Session start time: 12:01pm  Session end time: 12:51pm

## 2022-08-30 NOTE — PROGRESS NOTES
Assessment/Plan:  ER records reviewed  Including laboratory studies and MRI of the brain  Patient will follow-up neurology appropriately regarding migraine  Patient will start Augmentin 875 mg twice daily for 7 days for possible sinusitis complicating things  Patient will continue with dietary modification metformin for prediabetic state  Other guidance given at this time  Patient will follow-up per routine       Diagnoses and all orders for this visit:    Migraine with aura and with status migrainosus, not intractable    Acute non-recurrent maxillary sinusitis  -     amoxicillin-clavulanate (AUGMENTIN) 875-125 mg per tablet; Take 1 tablet by mouth every 12 (twelve) hours for 7 days    Prediabetes            Subjective:        Patient ID: Collin Frazier is a 33 y o  female  Patient is here status post hospitalization for intractable migraine, prediabetic state, sinus pain  Patient ultimately had migraine improved with DHE  Patient's pain right now is 1 at 10  No new neurologic changes  Patient with ongoing sinus pain and pressure  No significant discharge or fevers  No other new complaints at this time  Blood pressure 128/88 with heart rate of 100 respiratory rate 16 oxygen level 97% room air temperature 98 2° weight 239  The following portions of the patient's history were reviewed and updated as appropriate: allergies, current medications, past family history, past medical history, past social history, past surgical history and problem list       Review of Systems   Constitutional: Negative  HENT: Positive for sinus pressure and sinus pain  Eyes: Negative  Respiratory: Negative  Cardiovascular: Negative  Gastrointestinal: Negative  Endocrine: Negative  Genitourinary: Negative  Musculoskeletal: Negative  Skin: Negative  Allergic/Immunologic: Negative  Neurological: Negative  Hematological: Negative  Psychiatric/Behavioral: Negative  Objective: There were no vitals taken for this visit  Physical Exam  Vitals and nursing note reviewed  Constitutional:       General: She is not in acute distress  Appearance: Normal appearance  She is not ill-appearing, toxic-appearing or diaphoretic  HENT:      Head: Normocephalic and atraumatic  Right Ear: Tympanic membrane, ear canal and external ear normal  There is no impacted cerumen  Left Ear: Tympanic membrane, ear canal and external ear normal  There is no impacted cerumen  Nose: Nose normal  No congestion or rhinorrhea  Mouth/Throat:      Mouth: Mucous membranes are moist       Pharynx: No oropharyngeal exudate or posterior oropharyngeal erythema  Eyes:      General: No scleral icterus  Right eye: No discharge  Left eye: No discharge  Extraocular Movements: Extraocular movements intact  Conjunctiva/sclera: Conjunctivae normal       Pupils: Pupils are equal, round, and reactive to light  Neck:      Vascular: No carotid bruit  Cardiovascular:      Rate and Rhythm: Normal rate and regular rhythm  Pulses: Normal pulses  Heart sounds: Normal heart sounds  No murmur heard  No friction rub  No gallop  Pulmonary:      Effort: Pulmonary effort is normal  No respiratory distress  Breath sounds: Normal breath sounds  No stridor  No wheezing, rhonchi or rales  Chest:      Chest wall: No tenderness  Musculoskeletal:         General: No swelling, deformity or signs of injury  Cervical back: Neck supple  No rigidity  No muscular tenderness  Right lower leg: No edema  Left lower leg: No edema  Lymphadenopathy:      Cervical: No cervical adenopathy  Skin:     General: Skin is warm and dry  Capillary Refill: Capillary refill takes less than 2 seconds  Coloration: Skin is not jaundiced  Findings: No bruising, erythema, lesion or rash     Neurological:      Mental Status: She is alert and oriented to person, place, and time  Mental status is at baseline  Cranial Nerves: No cranial nerve deficit  Sensory: No sensory deficit  Motor: No weakness  Coordination: Coordination normal       Gait: Gait normal    Psychiatric:         Mood and Affect: Mood normal          Behavior: Behavior normal          Thought Content:  Thought content normal          Judgment: Judgment normal

## 2022-08-31 DIAGNOSIS — G43.709 CHRONIC MIGRAINE WITHOUT AURA WITHOUT STATUS MIGRAINOSUS, NOT INTRACTABLE: ICD-10-CM

## 2022-08-31 DIAGNOSIS — G43.711 INTRACTABLE CHRONIC MIGRAINE WITHOUT AURA AND WITH STATUS MIGRAINOSUS: Primary | ICD-10-CM

## 2022-08-31 RX ORDER — DIHYDROERGOTAMINE MESYLATE 4 MG/ML
1 SPRAY NASAL AS NEEDED
Qty: 16 ML | Refills: 0 | Status: SHIPPED | OUTPATIENT
Start: 2022-08-31 | End: 2023-03-30

## 2022-09-01 ENCOUNTER — TELEPHONE (OUTPATIENT)
Dept: NEUROLOGY | Facility: CLINIC | Age: 32
End: 2022-09-01

## 2022-09-01 NOTE — TELEPHONE ENCOUNTER
Received voicemail from [employer] advising DHE requires a prior authorization  Id 5   [id number]  Insurance phone number [phone]  Call back to pharmacy [phone]  Will work on same

## 2022-09-02 NOTE — TELEPHONE ENCOUNTER
Submitted prior Auth  via cover [employer], key [id number]; determination pending        donavan [id number]  bin [id number]  ID [id number]  [id number]

## 2022-09-06 ENCOUNTER — PATIENT MESSAGE (OUTPATIENT)
Dept: NEUROLOGY | Facility: CLINIC | Age: 32
End: 2022-09-06

## 2022-09-06 DIAGNOSIS — G43.709 CHRONIC MIGRAINE WITHOUT AURA WITHOUT STATUS MIGRAINOSUS, NOT INTRACTABLE: ICD-10-CM

## 2022-09-06 DIAGNOSIS — F43.12 CHRONIC POST-TRAUMATIC STRESS DISORDER (PTSD): ICD-10-CM

## 2022-09-06 RX ORDER — PRAZOSIN HYDROCHLORIDE 1 MG/1
1 CAPSULE ORAL
Qty: 30 CAPSULE | Refills: 2 | Status: SHIPPED | OUTPATIENT
Start: 2022-09-06 | End: 2022-09-22 | Stop reason: SDUPTHER

## 2022-09-06 RX ORDER — OLANZAPINE 2.5 MG/1
TABLET ORAL
Qty: 10 TABLET | Refills: 0 | Status: SHIPPED | OUTPATIENT
Start: 2022-09-06 | End: 2022-09-22 | Stop reason: SDUPTHER

## 2022-09-06 NOTE — TELEPHONE ENCOUNTER
I called insurance to clarify;  Gave updated information per this communication thread  24 hour turnaround time; determination pending      PA # [id number]

## 2022-09-06 NOTE — TELEPHONE ENCOUNTER
Recd denial for DHE:    Must confirm international headache society classification of headache disorders guidelines were used    Dose max is 9 per 30 days; Felix Power is this your recommended dosing? Thanks for your help

## 2022-09-06 NOTE — TELEPHONE ENCOUNTER
Medication Refill Request     Name of Medication Prazosin  Dose/Frequency 1mg daily  Quantity 30cap  Verified pharmacy   [x]  Verified ordering Provider   [x]  Does patient have enough for the next 3 days? Yes [x] No []  Does patient have a follow-up appointment scheduled?  Yes [x] No []   If so when is appointment: 9/22/22

## 2022-09-16 ENCOUNTER — TELEMEDICINE (OUTPATIENT)
Dept: BEHAVIORAL/MENTAL HEALTH CLINIC | Facility: CLINIC | Age: 32
End: 2022-09-16
Payer: COMMERCIAL

## 2022-09-16 DIAGNOSIS — F41.1 GENERALIZED ANXIETY DISORDER: Chronic | ICD-10-CM

## 2022-09-16 DIAGNOSIS — F33.1 DEPRESSION, MAJOR, RECURRENT, MODERATE (HCC): Primary | Chronic | ICD-10-CM

## 2022-09-16 PROCEDURE — 90834 PSYTX W PT 45 MINUTES: CPT | Performed by: SOCIAL WORKER

## 2022-09-16 NOTE — PSYCH
Virtual Regular Visit    Verification of patient location:    Patient is located in the following state in which I hold an active license [place]    Assessment/Plan:    Problem List Items Addressed This Visit        Other    Generalized anxiety disorder (Chronic)    Depression, major, recurrent, moderate (HCC) - Primary (Chronic)        Goals addressed in session: Goal 1      Reason for visit is No chief complaint on file  Encounter provider APARNA Morrison    Provider located at [address] [place] [id number] [phone]    Recent Visits  No visits were found meeting these conditions  Showing recent visits within past 7 days and meeting all other requirements  Future Appointments  No visits were found meeting these conditions  Showing future appointments within next 150 days and meeting all other requirements     The patient was identified by name and date of birth  Darshan Currie was informed that this is a telemedicine visit and that the visit is being conducted through[employer] and patient was informed this is a secure, HIPAA-complaint platform  She agrees to proceed     My office door was closed  The patient was notified the following individuals were present in the room: This session was attended by Jonatan Gabriel orientation  Stephanie provided verbal consent for the new employee to sit in on this and future sessions  She acknowledges understanding that she can opt out of student observations/participation at any time     She acknowledged consent and understanding of privacy and security of the video platform  The patient has agreed to participate and understands they can discontinue the visit at any time  Patient is aware this is a billable service  Ainsley Garza is a 33 y o  female  DATA: Met with Stephanie for scheduled individual session   Topics of discussion included relationships with family, physical health concerns, relationships with friends, mood regulation and symptoms and grief and loss  "I've been getting headaches a lot more " Stephanie discussed her physical health issues and medical follow up  We spent some time talking about self-care and natural ways to prevent migraines (e g , hydration, nutrition, exercise)  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  She states that she felt that she was under a lot of pressure, because the dog is terminally ill  Oli Acevedo states that she has started using her moshe art again  She discussed some feelings of grief/loss from the death of a friend who  last year  She states, "I don't know why it is hitting me now " Oli Acevedo discussed making a call to his mother, as well as her plans to get a tattoo in his honor  Oli Acevedo states that things with her uncle continue to be positive  Client shows evidence of utilizing distress tolerance skills skills to manage mental health symptoms  During this session, this clinician used the following therapeutic modalities: supportive psychotherapy, client-centered therapy, mindfulness-based strategies, DBT-informed skills, Motivational Interviewing and solution-focused therapy  ASSESSMENT: Oli Acevedo presents with a somewhat dysthymic mood  Her affect is normal range and intensity, appropriate  Oli Acevedo exhibits good therapeutic rapport with this clinician  Oli Acevedo continues to exhibit willingness to work on treatment goals and objectives  Oli Acevedo presents with a minimal risk of suicide, minimal risk of self-harm, and minimal risk of harm to others  PLAN: Oli Acevedo will return in two weeks for the next scheduled session  Between sessions, Oli Acevedo will continue to focus on her health-related issues and practicing healthy lifestyle choices  She will report back during the next session re: successes and barriers   At the next session, this clinician will use supportive psychotherapy, client-centered therapy, mindfulness-based strategies, DBT-informed skills, Motivational Interviewing and solution-focused therapy to address her mood regulation, health-related issues, and relationship concerns, in an effort to assist Alvin Marquis with meeting treatment goals  HPI     Past Medical History:   Diagnosis Date    Abnormal Pap smear of cervix     Allergic     Anxiety     Arthritis     Depression     Diabetes mellitus (HCC)     Fibromyalgia, primary     Hypertension     IBS (irritable bowel syndrome)     Migraine     Obesity     Vitamin B12 deficiency        Past Surgical History:   Procedure Laterality Date    COLONOSCOPY N/A 5/8/2018    Procedure: COLONOSCOPY;  Surgeon: Zully Georges MD;  Location: [facility] GI LAB; Service: Gastroenterology    MS EXC SKIN BENIG >4 CM REMAINDR BODY N/A 7/1/2021    Procedure: EXCISION OF PILAR SCALP CYST X 2 AND RIGHT INNER GROIN NEVVUS;  Surgeon: Eva Wray MD;  Location: [address] [place] [address] MAIN OR;  Service: Plastics    MS RECMPL WND SCALP,EXTR 2 6-7 5 CM N/A 7/1/2021    Procedure: CLOSURE WOUND SCALP X 2 AND RIGHT INNER GROIN;  Surgeon: Eva Wray MD;  Location:  MAIN OR;  Service: Plastics    TONSILLECTOMY      WISDOM TOOTH EXTRACTION         Current Outpatient Medications   Medication Sig Dispense Refill    acetaZOLAMIDE (DIAMOX) 250 mg tablet 1 tab BID x 1week, will increase further if needed  30 tablet 0    Aimovig 140 MG/ML SOAJ Inject 140mg under the skin every 30 (thirty) days  1 mL 10    ALPRAZolam (XANAX) 0 5 mg tablet Take 1 tablet (0 5 mg total) by mouth as needed in the morning and 1 tablet (0 5 mg total) as needed in the evening for anxiety   60 tablet 2    ARIPiprazole (ABILIFY) 2 mg tablet Take 1 tablet (2 mg total) by mouth daily 30 tablet 2    B-D 3CC LUER-DEANN SYR 25GX1" 25G X 1" 3 ML MISC        buPROPion (WELLBUTRIN XL) 300 mg 24 hr tablet Take 1 tablet (300 mg total) by mouth daily 90 tablet 0    celecoxib (CeleBREX) 100 mg capsule Take 1 capsule (100 mg total) by mouth 2 (two) times a day Prn migraine  Hold toradol, indocin  20 capsule 0    dicyclomine (BENTYL) 20 mg tablet Take 1 tablet (20 mg total) by mouth 2 (two) times a day 60 tablet 1    dihydroergotamine (MIGRANAL) 4 MG/ML nasal spray 1 spray into each nostril as needed for migraine Use in one nostril as directed  No more than 4 sprays in one hour 16 mL 0    diphenoxylate-atropine (LOMOTIL) 2 5-0 025 mg per tablet 1 tablet 4 times daily as needed for diarrhea 30 tablet 1    escitalopram (LEXAPRO) 20 mg tablet Take 1 tablet (20 mg total) by mouth in the morning  30 tablet 2    etonogestrel-ethinyl estradiol (NuvaRing) 0 12-0 015 MG/24HR vaginal ring Insert ring for 21 days then remove for one week  3 each 0    indomethacin (INDOCIN) 25 mg capsule 1 tab BID prn severe headache with food  Hold toradol  30 capsule 0    ketorolac (TORADOL) 10 mg tablet Take 1 tablet (10 mg total) by mouth every 6 (six) hours as needed (migraine) Max 2-3 per week  10 tablet 0    magnesium Oxide (MAG-OX) 400 mg TABS Take 1 tablet (400 mg total) by mouth daily 30 tablet 0    meclizine (ANTIVERT) 12 5 MG tablet Take 1 tablet (12 5 mg total) by mouth 3 (three) times a day as needed for dizziness 30 tablet 0    metFORMIN (GLUCOPHAGE-XR) 500 mg 24 hr tablet Take 2 tablets (1,000 mg total) by mouth daily with breakfast 60 tablet 2    OLANZapine (ZyPREXA) 2 5 mg tablet 1-2 tabs qhs prn migraine  Take 8-12 hours  from Abilify  Do not take with reglan  10 tablet 0    olmesartan (BENICAR) 20 mg tablet Take 1 tablet (20 mg total) by mouth in the morning   30 tablet 2    onabotulinumtoxin A (BOTOX) 100 units Inject as directed      ondansetron (ZOFRAN) 4 mg tablet Take 1 tablet (4 mg total) by mouth every 6 (six) hours 30 tablet 2    pantoprazole (PROTONIX) 40 mg tablet Take 1 tablet (40 mg total) by mouth daily 30 tablet 3    prazosin (MINIPRESS) 1 mg capsule Take 1 capsule (1 mg total) by mouth daily at bedtime 30 capsule 2    Riboflavin 400 MG CAPS Take 1 capsule (400 mg total) by mouth in the morning 30 capsule 0    Rimegepant Sulfate (NURTEC) 75 MG TBDP 1 tab at migraine onset  No more than one dose per 24 hours  Hold triptan  8 tablet 5    Syringe/Needle, Disp, (SYRINGE 3CC/94GK1-7/4") 27G X 1-1/4" 3 ML MISC Use for IM injection of ketorolac  4 each 0    Xeljanz XR 11 MG TB24        Current Facility-Administered Medications   Medication Dose Route Frequency Provider Last Rate Last Admin    cyanocobalamin injection 1,000 mcg  1,000 mcg Intramuscular Q30 Days Charito Nunnery, DO   1,000 mcg at 08/03/20 9525    cyanocobalamin injection 1,000 mcg  1,000 mcg Intramuscular Q14 Days Charito Nunnery, DO   1,000 mcg at 05/18/20 1146    cyanocobalamin injection 1,000 mcg  1,000 mcg Intramuscular Q30 Days Charito Nunnery, DO   1,000 mcg at 09/01/20 1129        Allergies   Allergen Reactions    Cimzia [Certolizumab Pegol] Swelling    Desvenlafaxine      Other reaction(s): state of confusion    Ixekizumab Vomiting    Valproic Acid Other (See Comments)     Other reaction(s): dilated pupils, "schizi"    Tizanidine Anxiety       Review of Systems    Video Exam    There were no vitals filed for this visit  Physical Exam     I spent 52 minutes directly with the patient during this visit       Session start time: 8:02am  Session end time: 8:54

## 2022-09-19 ENCOUNTER — TELEPHONE (OUTPATIENT)
Dept: NEUROLOGY | Facility: CLINIC | Age: 32
End: 2022-09-19

## 2022-09-19 NOTE — TELEPHONE ENCOUNTER
----- Message from Nova Mcdaniel sent at 9/19/2022  8:49 AM EDT -----  Regarding: FW: Headache/migraine     ----- Message -----  From: Callie Corley  Sent: 9/19/2022   8:36 AM EDT  To: Neurology Rima Larson Clinical  Subject: Headache/migraine                                I've been having a migraine or a headache everyday for at least the past week  I'm not sure why  I have an appointment with you But it's in November  Is there anything I can or should be doing?

## 2022-09-19 NOTE — TELEPHONE ENCOUNTER
----- Message from Sd Anthony sent at 9/19/2022  8:49 AM EDT -----  Regarding: FW: Headache/migraine     ----- Message -----  From: Lyn Valle  Sent: 9/19/2022   8:36 AM EDT  To: Neurology Jocelyn Ochoa Clinical  Subject: Headache/migraine                                I've been having a migraine or a headache everyday for at least the past week  I'm not sure why  I have an appointment with you But it's in November  Is there anything I can or should be doing?

## 2022-09-19 NOTE — TELEPHONE ENCOUNTER
Patient of Taran Alvarez, last visit 8/3    Patient sent my chart message today:    I've been having a migraine or a headache everyday for at least the past week  I'm not sure why  I have an appointment with you But it's in November  Is there anything I can or should be doing? I called and patient said she was using indomethacin but headache recurs  Tried DHE one time; helped for that day but recurred the next day  She said tried olanzapine in the past without relief so she is not receptive to switching from abilify to olanzapine; nurtec was not effective in the past so has not tried recently  Ketorolac sometimes effective for mild-moderate migraine  Was recently in ED 8/22 for migraine, was in hospital x 3 days for DHE injection  She is asking what she should use as abortive for effectiveness  Taking aimovig and botox preventative  Currently headache is "5/10" with nausea, using; said is tolerable but annoying    Please provide recommendation

## 2022-09-19 NOTE — TELEPHONE ENCOUNTER
----- Message from Kandi Skelton sent at 9/19/2022  8:49 AM EDT -----  Regarding: FW: Headache/migraine     ----- Message -----  From: Bartolo Becker  Sent: 9/19/2022   8:36 AM EDT  To: Neurology Anjali Pacheco Clinical  Subject: Headache/migraine                                I've been having a migraine or a headache everyday for at least the past week  I'm not sure why  I have an appointment with you But it's in November  Is there anything I can or should be doing?

## 2022-09-20 ENCOUNTER — VBI (OUTPATIENT)
Dept: ADMINISTRATIVE | Facility: OTHER | Age: 32
End: 2022-09-20

## 2022-09-20 NOTE — TELEPHONE ENCOUNTER
Pt should try nurtec again if she has it on hand  If that fails, can consider celebrex (has she ever tried that one?)  Can also repeat DHE a second time to see if that helps

## 2022-09-20 NOTE — TELEPHONE ENCOUNTER
Patient aware of recommendation  Verbalized understanding  If Nurtec or DHE is not effective she will call back for Celebrex as she is receptive to trying same

## 2022-09-22 ENCOUNTER — TELEPHONE (OUTPATIENT)
Dept: NEUROLOGY | Facility: CLINIC | Age: 32
End: 2022-09-22

## 2022-09-22 ENCOUNTER — TELEMEDICINE (OUTPATIENT)
Dept: PSYCHIATRY | Facility: CLINIC | Age: 32
End: 2022-09-22
Payer: COMMERCIAL

## 2022-09-22 ENCOUNTER — TELEPHONE (OUTPATIENT)
Dept: PSYCHIATRY | Facility: CLINIC | Age: 32
End: 2022-09-22

## 2022-09-22 DIAGNOSIS — F41.0 PANIC DISORDER WITHOUT AGORAPHOBIA: ICD-10-CM

## 2022-09-22 DIAGNOSIS — F41.1 GENERALIZED ANXIETY DISORDER: Chronic | ICD-10-CM

## 2022-09-22 DIAGNOSIS — G43.709 CHRONIC MIGRAINE WITHOUT AURA WITHOUT STATUS MIGRAINOSUS, NOT INTRACTABLE: ICD-10-CM

## 2022-09-22 DIAGNOSIS — F33.1 DEPRESSION, MAJOR, RECURRENT, MODERATE (HCC): Primary | Chronic | ICD-10-CM

## 2022-09-22 DIAGNOSIS — F43.12 CHRONIC POST-TRAUMATIC STRESS DISORDER (PTSD): ICD-10-CM

## 2022-09-22 PROBLEM — M94.0 COSTOCHONDRITIS: Status: ACTIVE | Noted: 2022-07-21

## 2022-09-22 PROCEDURE — 99213 OFFICE O/P EST LOW 20 MIN: CPT | Performed by: PSYCHIATRY & NEUROLOGY

## 2022-09-22 RX ORDER — ESCITALOPRAM OXALATE 20 MG/1
20 TABLET ORAL DAILY
Qty: 30 TABLET | Refills: 2 | Status: SHIPPED | OUTPATIENT
Start: 2022-09-22

## 2022-09-22 RX ORDER — OLANZAPINE 2.5 MG/1
2.5 TABLET ORAL DAILY
Qty: 30 TABLET | Refills: 2 | Status: SHIPPED | OUTPATIENT
Start: 2022-09-22

## 2022-09-22 RX ORDER — ALPRAZOLAM 0.5 MG/1
0.5 TABLET ORAL 2 TIMES DAILY PRN
Qty: 60 TABLET | Refills: 2 | Status: CANCELLED | OUTPATIENT
Start: 2022-09-22

## 2022-09-22 RX ORDER — ALPRAZOLAM 0.5 MG/1
0.5 TABLET, EXTENDED RELEASE ORAL EVERY MORNING
Qty: 30 TABLET | Refills: 2 | Status: SHIPPED | OUTPATIENT
Start: 2022-09-22 | End: 2022-09-23 | Stop reason: SDUPTHER

## 2022-09-22 RX ORDER — PRAZOSIN HYDROCHLORIDE 1 MG/1
1 CAPSULE ORAL
Qty: 30 CAPSULE | Refills: 2 | Status: SHIPPED | OUTPATIENT
Start: 2022-09-22

## 2022-09-22 RX ORDER — BUPROPION HYDROCHLORIDE 300 MG/1
300 TABLET ORAL DAILY
Qty: 90 TABLET | Refills: 0 | Status: SHIPPED | OUTPATIENT
Start: 2022-09-22

## 2022-09-22 NOTE — TELEPHONE ENCOUNTER
Spoke to patient  Patient states DHE worked better than nurtec, She is agreeable to continuing with DHE and discontinuing nurtec  Advised there are other options if she wishes to continue in the future  I - [facility] - please discontinue nurtec  Patient will continue with DHE

## 2022-09-22 NOTE — PSYCH
Virtual Regular Visit    Verification of patient location:    Patient is located in the following state in which I hold an active license [place]      Assessment/Plan:    Problem List Items Addressed This Visit        Cardiovascular and Mediastinum    Migraine headache    Relevant Medications    OLANZapine (ZyPREXA) 2 5 mg tablet    escitalopram (LEXAPRO) 20 mg tablet    buPROPion (WELLBUTRIN XL) 300 mg 24 hr tablet       Other    Depression, major, recurrent, moderate (HCC) - Primary (Chronic)    Relevant Medications    OLANZapine (ZyPREXA) 2 5 mg tablet    escitalopram (LEXAPRO) 20 mg tablet    buPROPion (WELLBUTRIN XL) 300 mg 24 hr tablet    ALPRAZolam ER (XANAX XR) 0 5 MG 24 hr tablet    Generalized anxiety disorder (Chronic)    Relevant Medications    OLANZapine (ZyPREXA) 2 5 mg tablet    escitalopram (LEXAPRO) 20 mg tablet    buPROPion (WELLBUTRIN XL) 300 mg 24 hr tablet    ALPRAZolam ER (XANAX XR) 0 5 MG 24 hr tablet    Panic disorder without agoraphobia    Relevant Medications    OLANZapine (ZyPREXA) 2 5 mg tablet    escitalopram (LEXAPRO) 20 mg tablet    buPROPion (WELLBUTRIN XL) 300 mg 24 hr tablet    ALPRAZolam ER (XANAX XR) 0 5 MG 24 hr tablet      Other Visit Diagnoses     Chronic post-traumatic stress disorder (PTSD)        Relevant Medications    OLANZapine (ZyPREXA) 2 5 mg tablet    prazosin (MINIPRESS) 1 mg capsule    escitalopram (LEXAPRO) 20 mg tablet    buPROPion (WELLBUTRIN XL) 300 mg 24 hr tablet    ALPRAZolam ER (XANAX XR) 0 5 MG 24 hr tablet                 Reason for visit is   Chief Complaint   Patient presents with    Virtual Regular Visit        Encounter provider Tasha Godinez MD    Provider located at [address] [place] [id number] [phone]      Recent Visits  Date Type Provider Dept   09/22/22 Telephone Tasha Godinez MD [address] [place] [address]   09/22/22 Telemedicine Zane Montiel  Papito Low recent visits within past 7 days and meeting all other requirements  Future Appointments  No visits were found meeting these conditions  Showing future appointments within next 150 days and meeting all other requirements       The patient was identified by name and date of birth  Cindy Bestjeanine was informed that this is a telemedicine visit and that the visit is being conducted throughDr. Zic Embedded and patient was informed this is a secure, HIPAA-complaint platform  She agrees to proceed     My office door was closed  No one else was in the room  She acknowledged consent and understanding of privacy and security of the video platform  The patient has agreed to participate and understands they can discontinue the visit at any time  Patient is aware this is a billable service  Hua Poon is a 33 y o  female with MDD and BESSY    Agueda Watson stated she continues to struggle with anxiety and frequent panic attacks  She stated Alprazolam helps but the effects wear off quickly and she will like to try a long acting version of Alprazolam to help manage her symptoms  She tried dose increase on Wellbutrin  mg qam for increased depression symptoms and it helped  She is also on Abilify 2 mg daily for MDD and recently Olanzapine 2 5 mg qhs prn was prescribed for migraines and helped  She will like to switch Abilify to Olanzapine since it can help both her mood and the migraine ROCHE's       She continues to meet with her counselor on a regular basis  She denies recent health changes or new medications   She agrees to above stated  treatment and will schedule follow up in 3 months or sooner if needed          HPI     Past Medical History:   Diagnosis Date    Abnormal Pap smear of cervix     Allergic     Anxiety     Arthritis     Depression     Diabetes mellitus (HCC)     Fibromyalgia, primary     Hypertension     IBS (irritable bowel syndrome)     Migraine     Obesity     Vitamin B12 deficiency        Past Surgical History:   Procedure Laterality Date    COLONOSCOPY N/A 5/8/2018    Procedure: COLONOSCOPY;  Surgeon: Gaston Cooper MD;  Location[facility]; Service: Gastroenterology    SC EXC SKIN BENIG >4 CM REMAINDR BODY N/A 7/1/2021    Procedure: EXCISION OF PILAR SCALP CYST X 2 AND RIGHT INNER GROIN NEVVUS;  Surgeon: Cesar Boggs MD;  Location: [address] [place] [address] OR;  Service: Plastics    SC RECMPL WND SCALP,EXTR 2 6-7 5 CM N/A 7/1/2021    Procedure: CLOSURE WOUND SCALP X 2 AND RIGHT INNER GROIN;  Surgeon: Cesar Boggs MD;  Location: [address] [place] [address] OR;  Service: Plastics    TONSILLECTOMY      WISDOM TOOTH EXTRACTION         Current Outpatient Medications   Medication Sig Dispense Refill    ALPRAZolam ER (XANAX XR) 0 5 MG 24 hr tablet Take 1 tablet (0 5 mg total) by mouth every morning 30 tablet 2    buPROPion (WELLBUTRIN XL) 300 mg 24 hr tablet Take 1 tablet (300 mg total) by mouth daily 90 tablet 0    escitalopram (LEXAPRO) 20 mg tablet Take 1 tablet (20 mg total) by mouth daily 30 tablet 2    OLANZapine (ZyPREXA) 2 5 mg tablet Take 1 tablet (2 5 mg total) by mouth daily Do not take with reglan  30 tablet 2    prazosin (MINIPRESS) 1 mg capsule Take 1 capsule (1 mg total) by mouth daily at bedtime 30 capsule 2    acetaZOLAMIDE (DIAMOX) 250 mg tablet 1 tab BID x 1week, will increase further if needed  30 tablet 0    Aimovig 140 MG/ML SOAJ Inject 140mg under the skin every 30 (thirty) days  1 mL 10    B-D 3CC LUER-DEANN SYR 25GX1" 25G X 1" 3 ML MISC        celecoxib (CeleBREX) 100 mg capsule Take 1 capsule (100 mg total) by mouth 2 (two) times a day Prn migraine  Hold toradol, indocin   20 capsule 0    dicyclomine (BENTYL) 20 mg tablet Take 1 tablet (20 mg total) by mouth 2 (two) times a day 60 tablet 1    dihydroergotamine (MIGRANAL) 4 MG/ML nasal spray 1 spray into each nostril as needed for migraine Use in one nostril as directed  No more than 4 sprays in one hour 16 mL 0    diphenoxylate-atropine (LOMOTIL) 2 5-0 025 mg per tablet 1 tablet 4 times daily as needed for diarrhea 30 tablet 1    etonogestrel-ethinyl estradiol (NuvaRing) 0 12-0 015 MG/24HR vaginal ring Insert ring for 21 days then remove for one week  3 each 0    indomethacin (INDOCIN) 25 mg capsule 1 tab BID prn severe headache with food  Hold toradol  30 capsule 0    ketorolac (TORADOL) 10 mg tablet Take 1 tablet (10 mg total) by mouth every 6 (six) hours as needed (migraine) Max 2-3 per week  10 tablet 0    magnesium Oxide (MAG-OX) 400 mg TABS Take 1 tablet (400 mg total) by mouth daily 30 tablet 0    meclizine (ANTIVERT) 12 5 MG tablet Take 1 tablet (12 5 mg total) by mouth 3 (three) times a day as needed for dizziness 30 tablet 0    metFORMIN (GLUCOPHAGE-XR) 500 mg 24 hr tablet Take 2 tablets (1,000 mg total) by mouth daily with breakfast 60 tablet 2    olmesartan (BENICAR) 20 mg tablet Take 1 tablet (20 mg total) by mouth in the morning   30 tablet 2    onabotulinumtoxin A (BOTOX) 100 units Inject as directed      ondansetron (ZOFRAN) 4 mg tablet Take 1 tablet (4 mg total) by mouth every 6 (six) hours 30 tablet 2    pantoprazole (PROTONIX) 40 mg tablet Take 1 tablet (40 mg total) by mouth daily 30 tablet 3    Riboflavin 400 MG CAPS Take 1 capsule (400 mg total) by mouth in the morning 30 capsule 0    Syringe/Needle, Disp, (SYRINGE 3CC/56LK1-8/4") 27G X 1-1/4" 3 ML MISC Use for IM injection of ketorolac  4 each 0    Xeljanz XR 11 MG TB24        Current Facility-Administered Medications   Medication Dose Route Frequency Provider Last Rate Last Admin    cyanocobalamin injection 1,000 mcg  1,000 mcg Intramuscular Q30 Days Maria C Elsie, DO   1,000 mcg at 08/03/20 0859    cyanocobalamin injection 1,000 mcg  1,000 mcg Intramuscular Q14 Days Maria C Elsie, DO   1,000 mcg at 05/18/20 1146    cyanocobalamin injection 1,000 mcg  1,000 mcg Intramuscular Q30 Days Donna Dumont, DO   1,000 mcg at 09/01/20 1129        Allergies   Allergen Reactions    Cimzia [Certolizumab Pegol] Swelling    Desvenlafaxine      Other reaction(s): state of confusion    Ixekizumab Vomiting    Valproic Acid Other (See Comments)     Other reaction(s): dilated pupils, "schizi"    Tizanidine Anxiety       Review of Systems     Mood Anxiety and Depression   Behavior Normal    Thought Content Disturbing Thoughts, Feelings   General Emotional Problems and Decreased Functioning   Personality Normal   Other Psych Symptoms Normal   Constitutional Negative   ENT Negative   Cardiovascular Negative   Respiratory Negative   Gastrointestinal Negative   Genitourinary Negative   Musculoskeletal Negative   Integumentary Negative   Neurological As Noted in HPI   Endocrine Normal    Other Symptoms Normal              Laboratory Results: No results found  However, due to the size of the patient record, not all encounters were searched  Please check Results Review for a complete set of results      Substance Abuse History:  Social History     Substance and Sexual Activity   Drug Use Yes    Frequency: 2 0 times per week    Types: Marijuana    Comment: medical marijuana (vape)       Family Psychiatric History:   Family History   Problem Relation Age of Onset    Rheum arthritis Mother     Psoriasis Mother     Other Mother     Hypertension Mother     Diabetes unspecified Mother     Sjogren's syndrome Mother     Alcohol abuse Mother     Drug abuse Mother     Anxiety disorder Father     Alcohol abuse Father     Lung cancer Maternal Grandfather     Cancer Other         bone    Diabetes Other     Other Other         High blood pressure    Depression Maternal Grandmother        The following portions of the patient's history were reviewed and updated as appropriate: allergies, current medications, past family history, past medical history, past social history, past surgical history and problem list     Social History     Socioeconomic History    Marital status: Single     Spouse name: Not on file    Number of children: 0    Years of education: 15    Highest education level: Not on file   Occupational History    Occupation: Unemployed     Employer: [employer]   Tobacco Use    Smoking status: Never Smoker    Smokeless tobacco: Never Used   Vaping Use    Vaping Use: Some days    Start date: 7/1/2019    Substances: THC, CBD   Substance and Sexual Activity    Alcohol use: Yes     Comment: rarely    Drug use: Yes     Frequency: 2 0 times per week     Types: Marijuana     Comment: medical marijuana (vape)    Sexual activity: Yes     Partners: Male     Comment: Nuva ring   Other Topics Concern    Not on file   Social History Narrative    Home:  Living with mom and MGM        Education:    Pt denies any h/o learning disability Dxs but admits to having great difficulty in math requiring a   She reached childhood milestones on time as far as he knows  Graduated HS 2009    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 college art classes--she stopped due to anxiety from dissatisfaction with her classes--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 felt they were telling her what to create  On reflection, she feels it was moreso her anxiety as the cause of her leaving school, and she was using the other reason as an excuse so she would not have to admit to anxiety at that time  She is now very open about her anxiety and does not mind that I have this information in the general social section of her chart  Social Determinants of Health     Financial Resource Strain: Low Risk     Difficulty of Paying Living Expenses: Not hard at all   Food Insecurity: No Food Insecurity    Worried About Running Out of Food in the Last Year: Never true    Fermín of Food in the Last Year: Never true   Transportation Needs: No Transportation Needs    Lack of Transportation (Medical):  No    Lack of Transportation (Non-Medical): No   Physical Activity: Not on file   Stress: Not on file   Social Connections: Not on file   Intimate Partner Violence: Not on file   Housing Stability: Not on file     Social History     Social History Narrative    Home:  Living with mom and MGM        Education:    Pt denies any h/o learning disability Dxs but admits to having great difficulty in math requiring a   She reached childhood milestones on time as far as he knows  Graduated HS 2009    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 college art classes--she stopped due to anxiety from dissatisfaction with her classes--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 felt they were telling her what to create  On reflection, she feels it was moreso her anxiety as the cause of her leaving school, and she was using the other reason as an excuse so she would not have to admit to anxiety at that time  She is now very open about her anxiety and does not mind that I have this information in the general social section of her chart         Objective:       Mental status:  Appearance calm and cooperative , adequate hygiene and grooming and good eye contact    Mood dysphoric   Affect affect was constricted   Speech a normal rate and fluent   Thought Processes coherent/organized and normal thought processes   Hallucinations no hallucinations present    Thought Content no delusions   Abnormal Thoughts no suicidal thoughts  and no homicidal thoughts    Orientation  oriented to person and place and time   Remote Memory short term memory intact and long term memory intact   Attention Span concentration intact   Intellect Appears to be of Average Intelligence   Insight Limited insight   Judgement judgment was limited   Muscle Strength n/a   Language no difficulty naming common objects and no difficulty repeating a phrase    Fund of Knowledge displays adequate knowledge of current events, adequate fund of knowledge regarding past history and adequate fund of knowledge regarding vocabulary                Assessment/Plan:       Diagnoses and all orders for this visit:    Depression, major, recurrent, moderate (HCC)    Chronic migraine without aura without status migrainosus, not intractable  -     OLANZapine (ZyPREXA) 2 5 mg tablet; Take 1 tablet (2 5 mg total) by mouth daily Do not take with reglan  Generalized anxiety disorder  -     escitalopram (LEXAPRO) 20 mg tablet; Take 1 tablet (20 mg total) by mouth daily  -     buPROPion (WELLBUTRIN XL) 300 mg 24 hr tablet; Take 1 tablet (300 mg total) by mouth daily  -     Discontinue: ALPRAZolam ER (XANAX XR) 0 5 MG 24 hr tablet; Take 1 tablet (0 5 mg total) by mouth every morning    Chronic post-traumatic stress disorder (PTSD)  -     prazosin (MINIPRESS) 1 mg capsule; Take 1 capsule (1 mg total) by mouth daily at bedtime    Panic disorder without agoraphobia  -     ALPRAZolam ER (XANAX XR) 0 5 MG 24 hr tablet; Take 1 tablet (0 5 mg total) by mouth every morning            Treatment Recommendations- Risks Benefits      Immediate Medical/Psychiatric/Psychotherapy Treatments and Any Precautions: switch Alprazolam to Xanax XR 0 5 mg po qam, D/C Abilify 2 mg daily and replace with Olanzapine 2 5 mg po qhs  Risks, Benefits And Possible Side Effects Of Medications:  {PSYCH RISK, BENEFITS AND POSSIBLE SIDE EFFECTS (Optional):09728    Controlled Medication Discussion: Discussed with patient Black Box warning on concurrent use of benzodiazepines and opioid medications including sedation, respiratory depression, coma and death  Patient understands the risk of treatment with benzodiazepines in addition to opioids and wants to continue taking those medications  , Discussed with patient the risks of sedation, respiratory depression, impairment of ability to drive and potential for abuse and addiction related to treatment with benzodiazepine medications   The patient understands risk of treatment with benzodiazepine medications, agrees to not drive if feels impaired and agrees to take medications as prescribed  and The patient has been filling controlled prescriptions on time as prescribed to [facility]       Psychotherapy Provided: Individual psychotherapy provided  Individual psychotherapy provided: Yes  Counseling was provided during the session today for 16 minutes  Medications, treatment progress and treatment plan reviewed with Cruz  Medication education provided to Cruz  Goals discussed during in session: improve control of anxiety and improve control of depression  Recent stressor including COVID-19 issues, relationship problems, health issues, medical problems, limited support, social difficulties, everyday stressors, ongoing anxiety and chronic mental illness discussed with Cruz  Coping strategies including compliance with medications, contacting a therapist, deep/slow breathing, eliminating avoidance, engaging in previously avoided activities, exercising, getting into a good routine, improving self-esteem, increasing energy, increasing interest in usual activities, increasing motivation, increasing social interaction, keeping busy at home, maintain healthy diet, maintain heathy sleeping hygiene and maintain positive attitude reviewed with Cruz  Importance of medication and treatment compliance reviewed with Cruz  Educated on importance of medication and treatment compliance  Importance of follow up with family physician for medical issues reviewed with Cruz  Discussed with Cruz acceptance of mental illness diagnosis and need for ongoing psychiatric treatment    Supportive therapy provided                I spent 30 minutes directly with the patient during this visit

## 2022-09-22 NOTE — TELEPHONE ENCOUNTER
Pharmacist from [facility] called stating Alprazolam ER 0 5 mg will need a prior authorization  Her insurance is [employer]

## 2022-09-22 NOTE — TELEPHONE ENCOUNTER
She is a pt of [facility] and I was covering when the DHE PA went through  It looks like she tried the DHE once and it helped the day she took it but the headache returned the next day  She called the office and [facility] recommended either trying the DHE again or taking Nurtec  If neither of those worked she was to call back for Celebrex  Can you find out if she retried DHE or if she wants to go back to Nurtec and send this message back to [facility]?

## 2022-09-22 NOTE — TELEPHONE ENCOUNTER
Message left on nursing line from pharmacy  Patient requested nurtec fill today  Insurance only approved DHE spray with the understanding that nurtec was being discontinued  If patient is continuing with nurtec DHE approval will be retracted and likely denied as insurance will only approve nurtec or DHE not both  Which would you like patient to be on? Marcos Estevez [phone]

## 2022-09-23 ENCOUNTER — HOSPITAL ENCOUNTER (OUTPATIENT)
Dept: NEUROLOGY | Facility: CLINIC | Age: 32
End: 2022-09-23
Payer: COMMERCIAL

## 2022-09-23 DIAGNOSIS — R42 DISEQUILIBRIUM: ICD-10-CM

## 2022-09-23 PROBLEM — F41.0 PANIC DISORDER WITHOUT AGORAPHOBIA: Status: ACTIVE | Noted: 2022-09-23

## 2022-09-23 PROCEDURE — 95816 EEG AWAKE AND DROWSY: CPT | Performed by: PSYCHIATRY & NEUROLOGY

## 2022-09-23 PROCEDURE — 95819 EEG AWAKE AND ASLEEP: CPT

## 2022-09-23 RX ORDER — ALPRAZOLAM 0.5 MG/1
0.5 TABLET, EXTENDED RELEASE ORAL EVERY MORNING
Qty: 30 TABLET | Refills: 2 | Status: SHIPPED | OUTPATIENT
Start: 2022-09-23

## 2022-09-26 ENCOUNTER — PATIENT MESSAGE (OUTPATIENT)
Dept: NEUROLOGY | Facility: CLINIC | Age: 32
End: 2022-09-26

## 2022-09-26 ENCOUNTER — TELEPHONE (OUTPATIENT)
Dept: PSYCHIATRY | Facility: CLINIC | Age: 32
End: 2022-09-26

## 2022-09-26 DIAGNOSIS — G43.709 CHRONIC MIGRAINE WITHOUT AURA WITHOUT STATUS MIGRAINOSUS, NOT INTRACTABLE: Primary | ICD-10-CM

## 2022-09-26 NOTE — TELEPHONE ENCOUNTER
(Please refer to telephone encounter dated 9/22/22)    Initiated and completed the Alprazolam 0 5 mg ER tab PA via [employer] and faxed it to [employer], marking it as "URGENT "    Spoke to Alvin Marquis notifying her of the Alprazolam 0 5 mg ER tab PA request and the PA process was explained to her  Will call her back when a decision is reached  For your review

## 2022-09-27 NOTE — TELEPHONE ENCOUNTER
Received a fax from [employer] approving the Alprazolam 0 5 mg ER tab from 9/26/22-9/26/23  Reviewed the PA approval with the [facility] pharmacist and he received a paid claim  Reviewed same with Stephanie  For your review  Will scan to [employer]

## 2022-09-27 NOTE — TELEPHONE ENCOUNTER
Deisi Buchanan from [employer] called and lm stating someone just called him about a PA approval for Alprazolam  When he tried to run through [employer] it stated a PA is needed   He is requesting a call back @ [phone]

## 2022-09-27 NOTE — TELEPHONE ENCOUNTER
Spoke to the [facility] pharmacist and he states that he still receives a PA rejection for the Alprazolam 0 5 mg Er tabs  Faxed the PA approval letter to the pharmacist so he can "fight with [facility]," as he is to call [facility] today to inquire about the PA      FYI

## 2022-09-28 ENCOUNTER — HOSPITAL ENCOUNTER (EMERGENCY)
Facility: HOSPITAL | Age: 32
Discharge: HOME | End: 2022-09-28
Attending: EMERGENCY MEDICINE
Payer: COMMERCIAL

## 2022-09-28 VITALS
TEMPERATURE: 97.8 F | WEIGHT: 239 LBS | DIASTOLIC BLOOD PRESSURE: 97 MMHG | HEIGHT: 63 IN | HEART RATE: 94 BPM | BODY MASS INDEX: 42.35 KG/M2 | SYSTOLIC BLOOD PRESSURE: 143 MMHG | RESPIRATION RATE: 16 BRPM | OXYGEN SATURATION: 97 %

## 2022-09-28 DIAGNOSIS — I10 PRIMARY HYPERTENSION: ICD-10-CM

## 2022-09-28 DIAGNOSIS — G43.909 MIGRAINE: Primary | ICD-10-CM

## 2022-09-28 LAB
EXT PREG TEST URINE: NEGATIVE
EXT. CONTROL ED NAV: NORMAL

## 2022-09-28 PROCEDURE — 96365 THER/PROPH/DIAG IV INF INIT: CPT

## 2022-09-28 PROCEDURE — 81025 URINE PREGNANCY TEST: CPT

## 2022-09-28 PROCEDURE — 96375 TX/PRO/DX INJ NEW DRUG ADDON: CPT

## 2022-09-28 PROCEDURE — 99283 EMERGENCY DEPT VISIT LOW MDM: CPT

## 2022-09-28 PROCEDURE — 99284 EMERGENCY DEPT VISIT MOD MDM: CPT

## 2022-09-28 RX ORDER — GABAPENTIN 100 MG/1
100 CAPSULE ORAL 3 TIMES DAILY
Qty: 30 CAPSULE | Refills: 0 | Status: SHIPPED | OUTPATIENT
Start: 2022-09-28

## 2022-09-28 RX ORDER — METHYLPREDNISOLONE 4 MG/1
TABLET ORAL
Qty: 21 TABLET | Refills: 0 | Status: SHIPPED | OUTPATIENT
Start: 2022-09-28 | End: 2022-09-30 | Stop reason: ALTCHOICE

## 2022-09-28 RX ORDER — OLMESARTAN MEDOXOMIL 20 MG/1
20 TABLET ORAL DAILY
Qty: 90 TABLET | Refills: 0 | Status: SHIPPED | OUTPATIENT
Start: 2022-09-28

## 2022-09-28 RX ORDER — METOCLOPRAMIDE HYDROCHLORIDE 5 MG/ML
10 INJECTION INTRAMUSCULAR; INTRAVENOUS ONCE
Status: COMPLETED | OUTPATIENT
Start: 2022-09-28 | End: 2022-09-28

## 2022-09-28 RX ORDER — MAGNESIUM SULFATE HEPTAHYDRATE 40 MG/ML
2 INJECTION, SOLUTION INTRAVENOUS ONCE
Status: COMPLETED | OUTPATIENT
Start: 2022-09-28 | End: 2022-09-28

## 2022-09-28 RX ORDER — KETOROLAC TROMETHAMINE 30 MG/ML
15 INJECTION, SOLUTION INTRAMUSCULAR; INTRAVENOUS ONCE
Status: COMPLETED | OUTPATIENT
Start: 2022-09-28 | End: 2022-09-28

## 2022-09-28 RX ORDER — OLMESARTAN MEDOXOMIL 20 MG/1
20 TABLET ORAL DAILY
Qty: 30 TABLET | Refills: 1 | Status: SHIPPED | OUTPATIENT
Start: 2022-09-28 | End: 2022-09-28 | Stop reason: SDUPTHER

## 2022-09-28 RX ORDER — DIPHENHYDRAMINE HYDROCHLORIDE 50 MG/ML
25 INJECTION INTRAMUSCULAR; INTRAVENOUS ONCE
Status: COMPLETED | OUTPATIENT
Start: 2022-09-28 | End: 2022-09-28

## 2022-09-28 RX ADMIN — SODIUM CHLORIDE 1000 ML: 0.9 INJECTION, SOLUTION INTRAVENOUS at 12:20

## 2022-09-28 RX ADMIN — KETOROLAC TROMETHAMINE 15 MG: 30 INJECTION, SOLUTION INTRAMUSCULAR at 12:56

## 2022-09-28 RX ADMIN — DIPHENHYDRAMINE HYDROCHLORIDE 25 MG: 50 INJECTION, SOLUTION INTRAMUSCULAR; INTRAVENOUS at 12:16

## 2022-09-28 RX ADMIN — MAGNESIUM SULFATE HEPTAHYDRATE 2 G: 40 INJECTION, SOLUTION INTRAVENOUS at 12:21

## 2022-09-28 RX ADMIN — METOCLOPRAMIDE HYDROCHLORIDE 10 MG: 5 INJECTION INTRAMUSCULAR; INTRAVENOUS at 12:16

## 2022-09-28 NOTE — ED NOTES
Patient has not yet provided a urine sample  Toradol not given until a pregnancy test is obtained        Rubén Washburn RN  09/28/22 [id number]

## 2022-09-28 NOTE — DISCHARGE INSTRUCTIONS
Please call your neurologist today to make a follow-up appointment  Neurologist today has the recommendation you were started on gabapentin 100 mg 3 times a day, and a Medrol Dosepak  Please monitor your sugars as the Medrol Dosepak can elevation

## 2022-09-28 NOTE — ED PROVIDER NOTES
History  Chief Complaint   Patient presents with    Migraine     Started about 10 days ago  History of similar migraines which last up to 6 weeks  Photophobia and nausea  Patient is a 70-year-old female arriving for evaluation of her migraines  Patient has a history of migraines in his migraine is consistent with previous headaches that she has had  Patient reports that she was recently admitted for having a migraine for 6 weeks  Patient reports that after she was discharged she had a migraine every other day  Patient reports that she then had a migraine that has been lasting for the past 10 days  Patient reports that headache is no worse intensity, is just not stopping  Patient reports that she stridor at home medications without any relief  Patient reports photosensitivity but denies any blurred or double vision  Patient denies any numbness or tingling  Patient denies any weakness in any extremity  Prior to Admission Medications   Prescriptions Last Dose Informant Patient Reported? Taking? ALPRAZolam ER (XANAX XR) 0 5 MG 24 hr tablet   No No   Sig: Take 1 tablet (0 5 mg total) by mouth every morning   Aimovig 140 MG/ML SOAJ  Self No No   Sig: Inject 140mg under the skin every 30 (thirty) days  B-D 3CC LUER-DEANN SYR 25GX1" 25G X 1" 3 ML MISC  Self Yes No   Sig:     OLANZapine (ZyPREXA) 2 5 mg tablet   No No   Sig: Take 1 tablet (2 5 mg total) by mouth daily Do not take with reglan  Riboflavin 400 MG CAPS   No No   Sig: Take 1 capsule (400 mg total) by mouth in the morning   Syringe/Needle, Disp, (SYRINGE 3CC/24ZY0-0/4") 27G X 1-1/4" 3 ML MISC  Self No No   Sig: Use for IM injection of ketorolac  Xeljanz XR 11 MG TB24   Yes No   buPROPion (WELLBUTRIN XL) 300 mg 24 hr tablet   No No   Sig: Take 1 tablet (300 mg total) by mouth daily   celecoxib (CeleBREX) 100 mg capsule   No No   Sig: Take 1 capsule (100 mg total) by mouth 2 (two) times a day Prn migraine  Hold toradol, indocin  dicyclomine (BENTYL) 20 mg tablet   No No   Sig: Take 1 tablet (20 mg total) by mouth 2 (two) times a day   dihydroergotamine (MIGRANAL) 4 MG/ML nasal spray   No No   Si spray into each nostril as needed for migraine Use in one nostril as directed  No more than 4 sprays in one hour   diphenoxylate-atropine (LOMOTIL) 2 5-0 025 mg per tablet  Self No No   Si tablet 4 times daily as needed for diarrhea   escitalopram (LEXAPRO) 20 mg tablet   No No   Sig: Take 1 tablet (20 mg total) by mouth daily   etonogestrel-ethinyl estradiol (NuvaRing) 0 12-0 015 MG/24HR vaginal ring   No No   Sig: Insert ring for 21 days then remove for one week  indomethacin (INDOCIN) 25 mg capsule   No No   Si tab BID prn severe headache with food   Hold toradol    ketorolac (TORADOL) 10 mg tablet  Self No No   Sig: Take 1 tablet (10 mg total) by mouth every 6 (six) hours as needed (migraine) Max 2-3 per week    magnesium Oxide (MAG-OX) 400 mg TABS   No No   Sig: Take 1 tablet (400 mg total) by mouth daily   meclizine (ANTIVERT) 12 5 MG tablet   No No   Sig: Take 1 tablet (12 5 mg total) by mouth 3 (three) times a day as needed for dizziness   metFORMIN (GLUCOPHAGE-XR) 500 mg 24 hr tablet   No No   Sig: Take 2 tablets (1,000 mg total) by mouth daily with breakfast   onabotulinumtoxin A (BOTOX) 100 units  Self Yes No   Sig: Inject as directed   ondansetron (ZOFRAN) 4 mg tablet   No No   Sig: Take 1 tablet (4 mg total) by mouth every 6 (six) hours   pantoprazole (PROTONIX) 40 mg tablet   No No   Sig: Take 1 tablet (40 mg total) by mouth daily   prazosin (MINIPRESS) 1 mg capsule   No No   Sig: Take 1 capsule (1 mg total) by mouth daily at bedtime      Facility-Administered Medications Last Administration Doses Remaining   cyanocobalamin injection 1,000 mcg 8/3/2020  8:59 AM    cyanocobalamin injection 1,000 mcg 2020 11:46 AM    cyanocobalamin injection 1,000 mcg 2020 11:29 AM           Past Medical History:   Diagnosis Date  Abnormal Pap smear of cervix     Allergic     Anxiety     Arthritis     Depression     Diabetes mellitus (HCC)     Fibromyalgia, primary     Hypertension     IBS (irritable bowel syndrome)     Migraine     Obesity     Vitamin B12 deficiency        Past Surgical History:   Procedure Laterality Date    COLONOSCOPY N/A 5/8/2018    Procedure: COLONOSCOPY;  Surgeon: Perla Rose MD;  Location: [facility] GI [facility]; Service: Gastroenterology    WY EXC SKIN BENIG >4 CM REMAINDR BODY N/A 7/1/2021    Procedure: EXCISION OF PILAR SCALP CYST X 2 AND RIGHT INNER GROIN NEVVUS;  Surgeon: Heath Cortez MD;  Location:  MAIN OR;  Service: Plastics    WY RECMPL WND SCALP,EXTR 2 6-7 5 CM N/A 7/1/2021    Procedure: CLOSURE WOUND SCALP X 2 AND RIGHT INNER GROIN;  Surgeon: Heath Cortez MD;  Location:  MAIN OR;  Service: Plastics    TONSILLECTOMY      WISDOM TOOTH EXTRACTION         Family History   Problem Relation Age of Onset    Rheum arthritis Mother     Psoriasis Mother     Other Mother     Hypertension Mother     Diabetes unspecified Mother     Sjogren's syndrome Mother     Alcohol abuse Mother     Drug abuse Mother     Anxiety disorder Father     Alcohol abuse Father     Lung cancer Maternal Grandfather     Cancer Other         bone    Diabetes Other     Other Other         High blood pressure    Depression Maternal Grandmother      I have reviewed and agree with the history as documented      E-Cigarette/Vaping    E-Cigarette Use Current Some Day User     Start Date 7/1/19     Comments 1 cartridge every 2 months       E-Cigarette/Vaping Substances    Nicotine No     THC Yes     CBD Yes     Flavoring No     Other No     Unknown No      Social History     Tobacco Use    Smoking status: Never Smoker    Smokeless tobacco: Never Used   Vaping Use    Vaping Use: Some days    Start date: 7/1/2019    Substances: THC, CBD   Substance Use Topics    Alcohol use: Yes     Comment: rarely  Drug use: Yes     Frequency: 2 0 times per week     Types: Marijuana     Comment: medical marijuana (vape)       Review of Systems   Constitutional: Negative  HENT: Negative  Eyes: Positive for photophobia  Respiratory: Negative  Cardiovascular: Negative  Gastrointestinal: Negative  Endocrine: Negative  Genitourinary: Negative  Musculoskeletal: Negative  Skin: Negative  Allergic/Immunologic: Negative  Neurological: Positive for headaches  Negative for dizziness, tremors, seizures, syncope, facial asymmetry, speech difficulty, weakness, light-headedness and numbness  Hematological: Negative  Psychiatric/Behavioral: Negative  All other systems reviewed and are negative  Physical Exam  Physical Exam  Vitals and nursing note reviewed  Constitutional:       Appearance: Normal appearance  She is normal weight  HENT:      Head: Normocephalic  Right Ear: External ear normal       Left Ear: External ear normal       Nose: Nose normal       Mouth/Throat:      Mouth: Mucous membranes are moist    Eyes:      General: No visual field deficit  Extraocular Movements: Extraocular movements intact  Conjunctiva/sclera: Conjunctivae normal       Pupils: Pupils are equal, round, and reactive to light  Cardiovascular:      Rate and Rhythm: Normal rate and regular rhythm  Pulses: Normal pulses  Pulmonary:      Effort: Pulmonary effort is normal       Breath sounds: Normal breath sounds  Abdominal:      General: Abdomen is flat  Bowel sounds are normal       Tenderness: There is no abdominal tenderness  Musculoskeletal:         General: Normal range of motion  Cervical back: Normal range of motion and neck supple  Skin:     General: Skin is warm  Capillary Refill: Capillary refill takes less than 2 seconds  Neurological:      General: No focal deficit present  Mental Status: She is alert and oriented to person, place, and time   Mental status is at baseline  GCS: GCS eye subscore is 4  GCS verbal subscore is 5  GCS motor subscore is 6  Cranial Nerves: Cranial nerves are intact  No cranial nerve deficit or dysarthria  Sensory: Sensation is intact  No sensory deficit  Motor: Motor function is intact  No weakness or tremor  Coordination: Coordination is intact  Gait: Gait is intact        Comments: 5/5 upper extremity strength, no numbness or tingling   5/5 lower extremity strength, no numbness or tingling   Psychiatric:         Mood and Affect: Mood normal          Vital Signs  ED Triage Vitals [09/28/22 1128]   Temperature Pulse Respirations Blood Pressure SpO2   97 8 °F (36 6 °C) 104 16 152/80 96 %      Temp Source Heart Rate Source Patient Position - Orthostatic VS BP Location FiO2 (%)   Oral Monitor Sitting Right arm --      Pain Score       6           Vitals:    09/28/22 1128 09/28/22 1414   BP: 152/80 143/97   Pulse: 104 94   Patient Position - Orthostatic VS: Sitting Sitting         Visual Acuity      ED Medications  Medications   metoclopramide (REGLAN) injection 10 mg (10 mg Intravenous Given 9/28/22 1216)   diphenhydrAMINE (BENADRYL) injection 25 mg (25 mg Intravenous Given 9/28/22 1216)   sodium chloride 0 9 % bolus 1,000 mL (0 mL Intravenous Stopped 9/28/22 1326)   ketorolac (TORADOL) injection 15 mg (15 mg Intravenous Given 9/28/22 1256)   magnesium sulfate 2 g/50 mL IVPB (premix) 2 g (0 g Intravenous Stopped 9/28/22 1326)       Diagnostic Studies  Results Reviewed     Procedure Component Value Units Date/Time    POCT pregnancy, urine [[id number]]  (Normal) Resulted: 09/28/22 1258    Lab Status: Final result Updated: 09/28/22 1258     EXT PREG TEST UR (Ref: Negative) Negative     Control Valid                 No orders to display              Procedures  Procedures         ED Course  ED Course as of 09/29/22 1331   Wed Sep 28, 2022   1323 Patient reports the pain went from a 6/10 to 4/10    1414 Discussed case with Dr Lainey Sebastian, recommendation for medrol dose pack, and gabapentin 100 mg TID, with follow up with neurology  Pt in agreement with plan  SBIRT 20yo+    Flowsheet Row Most Recent Value   SBIRT (25 yo +)    In order to provide better care to our patients, we are screening all of our patients for alcohol and drug use  Would it be okay to ask you these screening questions? Yes Filed at: 09/28/2022 1200   Initial Alcohol Screen: US AUDIT-C     1  How often do you have a drink containing alcohol? 0 Filed at: 09/28/2022 1200   2  How many drinks containing alcohol do you have on a typical day you are drinking? 0 Filed at: 09/28/2022 1200   3a  Male UNDER 65: How often do you have five or more drinks on one occasion? 0 Filed at: 09/28/2022 1200   3b  FEMALE Any Age, or MALE 65+: How often do you have 4 or more drinks on one occassion? 0 Filed at: 09/28/2022 1200   Audit-C Score 0 Filed at: 09/28/2022 1200   BRUCE: How many times in the past year have you    Used an illegal drug or used a prescription medication for non-medical reasons? Never Filed at: 09/28/2022 1200                    MDM  Number of Diagnoses or Management Options  Migraine  Diagnosis management comments: Pt arriving for treatment for her ongoing migraine that started 10 days ago  Patient reports that her headache is the same migraine that she always has  Reports that symptoms are no different  Patient reports that she was recently admitted for a 6 week long migraine that was unable to be controlled  Patient reports after that discharge she had a migraine every other day  Patient reports that this headache has been ongoing for 10 days  Patient reports that it is no different in intensity, no different symptoms  Patient reports that she did call her neurologist   Patient reports that she tried taking her at-home medications    This is the same migraine that she always incurs, with recent hospitalization no imaging necessary at this time  Discussed with patient that she is taking Zyprexa, and confirm with pharmacy that a 1 time dose of Reglan is acceptable  Will also provide Toradol pending negative pregnancy test   Will provide Benadryl as well  Patient also reporting decreased p o  intake as she has a lack of appetite secondary to her headache  Will provide IV fluids  As documented discussed case with Dr Luna Brody of Neurology with recommendation for Medrol Dosepak and to add gabapentin in addition to patient's regimen already  Prescriptions provided  Discussed Medrol Dosepak can elevated glucose as patient has a history of diabetes  Review of records show that she has a hemoglobin A1c of 5 5 from August   Patient endorses that she was taken off all of her diabetic medications as her sugars have been well controlled  Patient had a baseline sugar between 70 and 90 with 1 outlier of 180  Patient reports that her pain is a 4/10 and she is ready for discharge  Patient aware to follow-up with her neurologist   Patient has her phone number and reports that she will call her today  Reviewed with patient that there are no on site Neurology services at this campus  Reviewed strict return precautions with patient  Patient aware to continue follow-up with Neurology  Risk of Complications, Morbidity, and/or Mortality  General comments: PT arrived with migraine of 6/10 that reduced to a 4/10  Discussed with neurology and recommendations for gabapentin and medrol dose pack appreciated and implemented  Pt aware to follow up with neurology  Reviewed reasons to return to ed  Patient verbalized understanding of diagnosis and agreement with discharge plan of care as well as understanding of reasons to return to ed       Patient Progress  Patient progress: improved      Disposition  Final diagnoses:   Migraine     Time reflects when diagnosis was documented in both MDM as applicable and the Disposition within this note     Time User Action Codes Description Comment    9/28/2022  2:18 PM Chary Hobson Add [M79 [id number]] Migraine       ED Disposition     ED Disposition   Discharge    Condition   Stable    Date/Time   Wed Sep 28, 2022  2:18 PM    Comment   Shanae Alexander discharge to home/self care  Follow-up Information     Follow up With Specialties Details Why Contact Info Additional [address]  [phone]       Neurology [facility] Neurology Schedule an appointment as soon as possible for a visit today For further evaluation of symptoms [address] [place] [address] [address] [facility]  [phone] Neurology [facility], [address] [place] [address], [address]          Discharge Medication List as of 9/28/2022  2:29 PM      START taking these medications    Details   gabapentin (Neurontin) 100 mg capsule Take 1 capsule (100 mg total) by mouth 3 (three) times a day, Starting Wed 9/28/2022, Normal      methylPREDNISolone 4 MG tablet therapy pack Use as directed on package, Normal         CONTINUE these medications which have NOT CHANGED    Details   Aimovig 140 MG/ML SOAJ Inject 140mg under the skin every 30 (thirty) days  , Normal      ALPRAZolam ER (XANAX XR) 0 5 MG 24 hr tablet Take 1 tablet (0 5 mg total) by mouth every morning, Starting Fri 9/23/2022, Normal      !! B-D 3CC LUER-DEANN SYR 25GX1" 25G X 1" 3 ML MISC  , Starting Thu 7/26/2018, Historical Med      buPROPion (WELLBUTRIN XL) 300 mg 24 hr tablet Take 1 tablet (300 mg total) by mouth daily, Starting Thu 9/22/2022, Normal      celecoxib (CeleBREX) 100 mg capsule Take 1 capsule (100 mg total) by mouth 2 (two) times a day Prn migraine   Hold toradol, indocin , Starting Wed 8/17/2022, Normal      dicyclomine (BENTYL) 20 mg tablet Take 1 tablet (20 mg total) by mouth 2 (two) times a day, Starting Fri 5/27/2022, Normal dihydroergotamine (MIGRANAL) 4 MG/ML nasal spray 1 spray into each nostril as needed for migraine Use in one nostril as directed  No more than 4 sprays in one hour, Starting Wed 8/31/2022, Normal      diphenoxylate-atropine (LOMOTIL) 2 5-0 025 mg per tablet 1 tablet 4 times daily as needed for diarrhea, Normal      escitalopram (LEXAPRO) 20 mg tablet Take 1 tablet (20 mg total) by mouth daily, Starting Thu 9/22/2022, Normal      etonogestrel-ethinyl estradiol (NuvaRing) 0 12-0 015 MG/24HR vaginal ring Insert ring for 21 days then remove for one week , Normal      indomethacin (INDOCIN) 25 mg capsule 1 tab BID prn severe headache with food   Hold toradol , Normal      ketorolac (TORADOL) 10 mg tablet Take 1 tablet (10 mg total) by mouth every 6 (six) hours as needed (migraine) Max 2-3 per week , Starting Tue 3/22/2022, Normal      magnesium Oxide (MAG-OX) 400 mg TABS Take 1 tablet (400 mg total) by mouth daily, Starting Sun 8/7/2022, Until Tue 9/6/2022, Normal      meclizine (ANTIVERT) 12 5 MG tablet Take 1 tablet (12 5 mg total) by mouth 3 (three) times a day as needed for dizziness, Starting Wed 8/3/2022, Normal      metFORMIN (GLUCOPHAGE-XR) 500 mg 24 hr tablet Take 2 tablets (1,000 mg total) by mouth daily with breakfast, Starting Tue 5/10/2022, Normal      OLANZapine (ZyPREXA) 2 5 mg tablet Take 1 tablet (2 5 mg total) by mouth daily Do not take with reglan , Starting Thu 9/22/2022, Normal      onabotulinumtoxin A (BOTOX) 100 units Inject as directed, Starting Tue 8/29/2017, Historical Med      ondansetron (ZOFRAN) 4 mg tablet Take 1 tablet (4 mg total) by mouth every 6 (six) hours, Starting Fri 5/27/2022, Normal      pantoprazole (PROTONIX) 40 mg tablet Take 1 tablet (40 mg total) by mouth daily, Starting Fri 5/27/2022, Normal      prazosin (MINIPRESS) 1 mg capsule Take 1 capsule (1 mg total) by mouth daily at bedtime, Starting Thu 9/22/2022, Normal      Riboflavin 400 MG CAPS Take 1 capsule (400 mg total) by mouth in the morning, Starting Sun 8/7/2022, Until Tue 9/6/2022, Normal      !! Syringe/Needle, Disp, (SYRINGE 3CC/79OU1-3/4") 27G X 1-1/4" 3 ML MISC Use for IM injection of ketorolac , Normal      Xeljanz XR 11 MG TB24 Starting Mon 8/22/2022, Historical Med      acetaZOLAMIDE (DIAMOX) 250 mg tablet 1 tab BID x 1week, will increase further if needed , Normal      olmesartan (BENICAR) 20 mg tablet Take 1 tablet (20 mg total) by mouth in the morning , Starting Mon 5/23/2022, Normal       !! - Potential duplicate medications found  Please discuss with provider  No discharge procedures on file      PDMP Review       Value Time User    PDMP Reviewed  Yes 9/23/2022  1:47 PM Emelina Marie MD          ED Provider  Electronically Signed by           Yin Bennett  09/29/22 7629

## 2022-09-29 RX ORDER — VERAPAMIL HYDROCHLORIDE 40 MG/1
TABLET ORAL
Qty: 60 TABLET | Refills: 2 | Status: SHIPPED | OUTPATIENT
Start: 2022-09-29 | End: 2022-10-28

## 2022-09-29 NOTE — PATIENT COMMUNICATION
pt left vm stating that she left a Sxbbmt message  she has had a migraine for 1 5 weeks and not sure what she should be doing  states that she was just in the hospital for a 6 week migraine     [phone]

## 2022-09-30 ENCOUNTER — HOSPITAL ENCOUNTER (EMERGENCY)
Facility: HOSPITAL | Age: 32
Discharge: HOME | End: 2022-09-30
Attending: EMERGENCY MEDICINE
Payer: COMMERCIAL

## 2022-09-30 ENCOUNTER — TELEPHONE (OUTPATIENT)
Dept: NEUROLOGY | Facility: CLINIC | Age: 32
End: 2022-09-30

## 2022-09-30 ENCOUNTER — TELEMEDICINE (OUTPATIENT)
Dept: BEHAVIORAL/MENTAL HEALTH CLINIC | Facility: CLINIC | Age: 32
End: 2022-09-30
Payer: COMMERCIAL

## 2022-09-30 VITALS
RESPIRATION RATE: 18 BRPM | OXYGEN SATURATION: 99 % | HEART RATE: 88 BPM | DIASTOLIC BLOOD PRESSURE: 67 MMHG | WEIGHT: 239 LBS | SYSTOLIC BLOOD PRESSURE: 118 MMHG | BODY MASS INDEX: 42.35 KG/M2 | HEIGHT: 63 IN | TEMPERATURE: 98.1 F

## 2022-09-30 DIAGNOSIS — G43.901 STATUS MIGRAINOSUS: ICD-10-CM

## 2022-09-30 DIAGNOSIS — G43.709 CHRONIC MIGRAINE WITHOUT AURA WITHOUT STATUS MIGRAINOSUS, NOT INTRACTABLE: Primary | ICD-10-CM

## 2022-09-30 DIAGNOSIS — F41.1 GENERALIZED ANXIETY DISORDER: Chronic | ICD-10-CM

## 2022-09-30 DIAGNOSIS — G89.29 CHRONIC HEADACHES: Primary | ICD-10-CM

## 2022-09-30 DIAGNOSIS — F33.1 DEPRESSION, MAJOR, RECURRENT, MODERATE (HCC): Primary | Chronic | ICD-10-CM

## 2022-09-30 DIAGNOSIS — R51.9 CHRONIC HEADACHES: Primary | ICD-10-CM

## 2022-09-30 DIAGNOSIS — G43.711 INTRACTABLE CHRONIC MIGRAINE WITHOUT AURA AND WITH STATUS MIGRAINOSUS: ICD-10-CM

## 2022-09-30 PROCEDURE — 99284 EMERGENCY DEPT VISIT MOD MDM: CPT | Performed by: EMERGENCY MEDICINE

## 2022-09-30 PROCEDURE — 99283 EMERGENCY DEPT VISIT LOW MDM: CPT

## 2022-09-30 PROCEDURE — 96365 THER/PROPH/DIAG IV INF INIT: CPT

## 2022-09-30 PROCEDURE — 96375 TX/PRO/DX INJ NEW DRUG ADDON: CPT

## 2022-09-30 PROCEDURE — 90834 PSYTX W PT 45 MINUTES: CPT | Performed by: SOCIAL WORKER

## 2022-09-30 RX ORDER — METOCLOPRAMIDE HYDROCHLORIDE 5 MG/ML
10 INJECTION INTRAMUSCULAR; INTRAVENOUS ONCE
Status: COMPLETED | OUTPATIENT
Start: 2022-09-30 | End: 2022-09-30

## 2022-09-30 RX ORDER — DIPHENHYDRAMINE HYDROCHLORIDE 50 MG/ML
25 INJECTION INTRAMUSCULAR; INTRAVENOUS ONCE
Status: COMPLETED | OUTPATIENT
Start: 2022-09-30 | End: 2022-09-30

## 2022-09-30 RX ORDER — KETOROLAC TROMETHAMINE 30 MG/ML
30 INJECTION, SOLUTION INTRAMUSCULAR; INTRAVENOUS ONCE
Status: COMPLETED | OUTPATIENT
Start: 2022-09-30 | End: 2022-09-30

## 2022-09-30 RX ORDER — KETOROLAC TROMETHAMINE 10 MG/1
10 TABLET, FILM COATED ORAL EVERY 6 HOURS PRN
Qty: 10 TABLET | Refills: 0 | Status: SHIPPED | OUTPATIENT
Start: 2022-09-30 | End: 2022-12-12

## 2022-09-30 RX ORDER — MAGNESIUM SULFATE 1 G/100ML
1 INJECTION INTRAVENOUS ONCE
Status: COMPLETED | OUTPATIENT
Start: 2022-09-30 | End: 2022-09-30

## 2022-09-30 RX ORDER — PREDNISONE 10 MG/1
TABLET ORAL
Qty: 32 TABLET | Refills: 0 | Status: SHIPPED | OUTPATIENT
Start: 2022-09-30 | End: 2022-10-28

## 2022-09-30 RX ORDER — CELECOXIB 100 MG/1
100 CAPSULE ORAL 2 TIMES DAILY
Qty: 20 CAPSULE | Refills: 0 | Status: SHIPPED | OUTPATIENT
Start: 2022-09-30 | End: 2022-11-14 | Stop reason: ALTCHOICE

## 2022-09-30 RX ADMIN — METOCLOPRAMIDE HYDROCHLORIDE 10 MG: 5 INJECTION INTRAMUSCULAR; INTRAVENOUS at 14:33

## 2022-09-30 RX ADMIN — MAGNESIUM SULFATE IN DEXTROSE 1 G: 10 INJECTION, SOLUTION INTRAVENOUS at 14:34

## 2022-09-30 RX ADMIN — DIPHENHYDRAMINE HYDROCHLORIDE 25 MG: 50 INJECTION, SOLUTION INTRAMUSCULAR; INTRAVENOUS at 14:33

## 2022-09-30 RX ADMIN — KETOROLAC TROMETHAMINE 30 MG: 30 INJECTION, SOLUTION INTRAMUSCULAR at 14:32

## 2022-09-30 RX ADMIN — SODIUM CHLORIDE 500 ML: 0.9 INJECTION, SOLUTION INTRAVENOUS at 14:31

## 2022-09-30 NOTE — PSYCH
Virtual Regular Visit     Verification of patient location:    Patient is located in the following state in which I hold an active license [place]    Assessment/Plan:    Problem List Items Addressed This Visit        Other    Generalized anxiety disorder (Chronic)    Depression, major, recurrent, moderate (HCC) - Primary (Chronic)        Goals addressed in session: Goal 1      Reason for visit is No chief complaint on file  Encounter provider APARNA Viramontes    Provider located at [address] [place] [id number] [phone]    Recent Visits  No visits were found meeting these conditions  Showing recent visits within past 7 days and meeting all other requirements  Future Appointments  No visits were found meeting these conditions  Showing future appointments within next 150 days and meeting all other requirements       The patient was identified by name and date of birth  Chaka Moeller was informed that this is a telemedicine visit and that the visit is being conducted throughEpic Embedded and patient was informed this is a secure, HIPAA-complaint platform  She agrees to proceed     My office door was closed  No one else was in the room  She acknowledged consent and understanding of privacy and security of the video platform  The patient has agreed to participate and understands they can discontinue the visit at any time  Patient is aware this is a billable service  Cam Murray is a 33 y o  female  DATA: Met with Stephanie for scheduled individual session  Topics of discussion included physical health concerns, relationships with friends and mood regulation and symptoms  "I freaked out  I think I have a problem with anger " Nae Barr discussed a recent incident when she became very angry with someone  This person owed her money and was being very dismissive of her   She states that she got very angry with him and was yelling at him  Stephanie states, "I never get angry " We discussed the emotion of anger and how to first identify the signs of anger, so she can learn to experience it, and then we can work on learning to manage this emotion in a more regulated manner  This clinician emailed the DBT handout re: recognizing and describing emotions  Betty Talavera discussed her relationship with her best friend and her friend's daughter  She discussed her frustration with the way that her best friend's daughter has been interacting with her  She discussed a recent incident when her friend's daughter made a comment to her that was very hurtful  We discussed ways to respond to these situations  Stephanie spent time talking about her medical concerns  She reports that she has had a migraine for the past two weeks  She states that she has been very frustrated with her friend's ([employer]) response to her medical issues  We discussed confronting her friend re: her concerns  She acknowledged understanding that she should not approach her friend when she is still in "emotion mind " Client shows evidence of utilizing Mindfulness-based strategies and emotion regulation skills skills to manage mental health symptoms  During this session, this clinician used the following therapeutic modalities: supportive psychotherapy, client-centered therapy, mindfulness-based strategies, DBT-informed skills, Motivational Interviewing and solution-focused therapy  ASSESSMENT: Betty Talavera presents with a primarily dysthymic mood  Her affect is normal range and intensity, appropriate  Betty Talavera exhibits good therapeutic rapport with this clinician  Betty Talavera continues to exhibit willingness to work on treatment goals and objectives  Betty Talavera presents with a minimal risk of suicide, minimal risk of self-harm, and minimal risk of harm to others  PLAN: Betty Talavera will return in two weeks for the next scheduled session   Between sessions, Robertsari Talavera will review the emotion regulation handouts, have a discussion with her best friend, and monitor her moods  She will follow up with her medical providers re: her migraine headache and will report back during the next session re: successes and barriers  At the next session, this clinician will use supportive psychotherapy, client-centered therapy, mindfulness-based strategies, DBT-informed skills, Motivational Interviewing and solution-focused therapy to address her mood regulation, relationship concerns, and medical issues, in an effort to assist Alvin Marquis with meeting treatment goals  HPI     Past Medical History:   Diagnosis Date    Abnormal Pap smear of cervix     Allergic     Anxiety     Arthritis     Depression     Diabetes mellitus (HCC)     Fibromyalgia, primary     Hypertension     IBS (irritable bowel syndrome)     Migraine     Obesity     Vitamin B12 deficiency        Past Surgical History:   Procedure Laterality Date    COLONOSCOPY N/A 5/8/2018    Procedure: COLONOSCOPY;  Surgeon: Johann Mcintyre MD;  Location: [facility]; Service: Gastroenterology    DE EXC SKIN BENIG >4 CM REMAINDR BODY N/A 7/1/2021    Procedure: EXCISION OF PILAR SCALP CYST X 2 AND RIGHT INNER GROIN NEVVUS;  Surgeon: Nancy Bourgeois MD;  Location: [address] [place] [address];  Service: Plastics    DE RECMPL WND SCALP,EXTR 2 6-7 5 CM N/A 7/1/2021    Procedure: CLOSURE WOUND SCALP X 2 AND RIGHT INNER GROIN;  Surgeon: Nancy Bourgeois MD;  Location: [address] [place] [address];  Service: Plastics    TONSILLECTOMY      WISDOM TOOTH EXTRACTION         Current Outpatient Medications   Medication Sig Dispense Refill    Aimovig 140 MG/ML SOAJ Inject 140mg under the skin every 30 (thirty) days   1 mL 10    ALPRAZolam ER (XANAX XR) 0 5 MG 24 hr tablet Take 1 tablet (0 5 mg total) by mouth every morning 30 tablet 2    B-D 3CC LUER-DEANN SYR 25GX1" 25G X 1" 3 ML MISC        buPROPion (WELLBUTRIN XL) 300 mg 24 hr tablet Take 1 tablet (300 mg total) by mouth daily 90 tablet 0    celecoxib (CeleBREX) 100 mg capsule Take 1 capsule (100 mg total) by mouth 2 (two) times a day Prn migraine  Hold toradol, indocin  20 capsule 0    dicyclomine (BENTYL) 20 mg tablet Take 1 tablet (20 mg total) by mouth 2 (two) times a day 60 tablet 1    dihydroergotamine (MIGRANAL) 4 MG/ML nasal spray 1 spray into each nostril as needed for migraine Use in one nostril as directed  No more than 4 sprays in one hour 16 mL 0    diphenoxylate-atropine (LOMOTIL) 2 5-0 025 mg per tablet 1 tablet 4 times daily as needed for diarrhea 30 tablet 1    escitalopram (LEXAPRO) 20 mg tablet Take 1 tablet (20 mg total) by mouth daily 30 tablet 2    etonogestrel-ethinyl estradiol (NuvaRing) 0 12-0 015 MG/24HR vaginal ring Insert ring for 21 days then remove for one week  3 each 0    gabapentin (Neurontin) 100 mg capsule Take 1 capsule (100 mg total) by mouth 3 (three) times a day 30 capsule 0    indomethacin (INDOCIN) 25 mg capsule 1 tab BID prn severe headache with food  Hold toradol  30 capsule 0    ketorolac (TORADOL) 10 mg tablet Take 1 tablet (10 mg total) by mouth every 6 (six) hours as needed (migraine) Max 2-3 per week  10 tablet 0    magnesium Oxide (MAG-OX) 400 mg TABS Take 1 tablet (400 mg total) by mouth daily 30 tablet 0    meclizine (ANTIVERT) 12 5 MG tablet Take 1 tablet (12 5 mg total) by mouth 3 (three) times a day as needed for dizziness 30 tablet 0    metFORMIN (GLUCOPHAGE-XR) 500 mg 24 hr tablet Take 2 tablets (1,000 mg total) by mouth daily with breakfast 60 tablet 2    methylPREDNISolone 4 MG tablet therapy pack Use as directed on package 21 tablet 0    OLANZapine (ZyPREXA) 2 5 mg tablet Take 1 tablet (2 5 mg total) by mouth daily Do not take with reglan   30 tablet 2    olmesartan (BENICAR) 20 mg tablet Take 1 tablet (20 mg total) by mouth daily 90 tablet 0    onabotulinumtoxin A (BOTOX) 100 units Inject as directed      ondansetron (ZOFRAN) 4 mg tablet Take 1 tablet (4 mg total) by mouth every 6 (six) hours 30 tablet 2    pantoprazole (PROTONIX) 40 mg tablet Take 1 tablet (40 mg total) by mouth daily 30 tablet 3    prazosin (MINIPRESS) 1 mg capsule Take 1 capsule (1 mg total) by mouth daily at bedtime 30 capsule 2    Riboflavin 400 MG CAPS Take 1 capsule (400 mg total) by mouth in the morning 30 capsule 0    Syringe/Needle, Disp, (SYRINGE 3CC/02DQ5-6/4") 27G X 1-1/4" 3 ML MISC Use for IM injection of ketorolac  4 each 0    verapamil (CALAN) 40 mg tablet 1 tab qhs x 1 week, then BID if tolerated  60 tablet 2    Xeljanz XR 11 MG TB24        Current Facility-Administered Medications   Medication Dose Route Frequency Provider Last Rate Last Admin    cyanocobalamin injection 1,000 mcg  1,000 mcg Intramuscular Q30 Days Vianca Jaqui, DO   1,000 mcg at 08/03/20 1456    cyanocobalamin injection 1,000 mcg  1,000 mcg Intramuscular Q14 Days Vianca Jaqui, DO   1,000 mcg at 05/18/20 1146    cyanocobalamin injection 1,000 mcg  1,000 mcg Intramuscular Q30 Days Vianca Jaqui, DO   1,000 mcg at 09/01/20 1129        Allergies   Allergen Reactions    Cimzia [Certolizumab Pegol] Swelling    Desvenlafaxine      Other reaction(s): state of confusion    Ixekizumab Vomiting    Valproic Acid Other (See Comments)     Other reaction(s): dilated pupils, "schizi"    Tizanidine Anxiety       Review of Systems    Video Exam    There were no vitals filed for this visit      Physical Exam     I spent 52 minutes directly with the patient during this visit     Session start time: 8:03am  Session end time: 8:55am

## 2022-09-30 NOTE — TELEPHONE ENCOUNTER
Voicemail Transcript  "Julio, my name is Stephanie turcios, 89  My phone number is [phone]  I had call yesterday or the day before I am in about 2 week migraine  Now, my girl is about 81  Arliss Abhinav gave me steroids and I just feel like I need something else to help me with the pain  If someone could call me back again, my number is [phone]  Thank you "    Patient called and left  stating she has had a migraine for about 2 weeks   States she called in yesterday, [address] gave her steroids but is asking for something else to help with the pain     Please advise

## 2022-09-30 NOTE — ED PROVIDER NOTES
History  Chief Complaint   Patient presents with    Migraine     HPI      This is a very pleasant, nontoxic 41-year-old female presents the emergency department with a longstanding history of well documented migraines, fall closely by Neurology, prior neuro imaging in the end of August of this year MRI of the brain showed no acute pathology  History of depression  Week and half history, similar to prior migraines, headaches confined to the left side of her head with tenderness, visual changes, less nausea the normal when she has her migraine headaches  Patient recently was seen in the emergency department 2 days ago for a migraine headache  No history of fever, chills, sore throat, syncope, reports this is like her normal prior headaches  Prior to Admission Medications   Prescriptions Last Dose Informant Patient Reported? Taking? ALPRAZolam ER (XANAX XR) 0 5 MG 24 hr tablet   No No   Sig: Take 1 tablet (0 5 mg total) by mouth every morning   Aimovig 140 MG/ML SOAJ  Self No No   Sig: Inject 140mg under the skin every 30 (thirty) days  B-D 3CC LUER-DEANN SYR 25GX1" 25G X 1" 3 ML MISC  Self Yes No   Sig:     OLANZapine (ZyPREXA) 2 5 mg tablet   No No   Sig: Take 1 tablet (2 5 mg total) by mouth daily Do not take with reglan  Riboflavin 400 MG CAPS   No No   Sig: Take 1 capsule (400 mg total) by mouth in the morning   Syringe/Needle, Disp, (SYRINGE 3CC/98BX4-5/4") 27G X 1-1/4" 3 ML MISC  Self No No   Sig: Use for IM injection of ketorolac  Xeljanz XR 11 MG TB24   Yes No   buPROPion (WELLBUTRIN XL) 300 mg 24 hr tablet   No No   Sig: Take 1 tablet (300 mg total) by mouth daily   celecoxib (CeleBREX) 100 mg capsule   No No   Sig: Take 1 capsule (100 mg total) by mouth 2 (two) times a day Prn migraine  Hold toradol, indocin     dicyclomine (BENTYL) 20 mg tablet   No No   Sig: Take 1 tablet (20 mg total) by mouth 2 (two) times a day   dihydroergotamine (MIGRANAL) 4 MG/ML nasal spray   No No   Si spray into each nostril as needed for migraine Use in one nostril as directed  No more than 4 sprays in one hour   diphenoxylate-atropine (LOMOTIL) 2 5-0 025 mg per tablet  Self No No   Si tablet 4 times daily as needed for diarrhea   escitalopram (LEXAPRO) 20 mg tablet   No No   Sig: Take 1 tablet (20 mg total) by mouth daily   etonogestrel-ethinyl estradiol (NuvaRing) 0 12-0 015 MG/24HR vaginal ring   No No   Sig: Insert ring for 21 days then remove for one week    gabapentin (Neurontin) 100 mg capsule   No No   Sig: Take 1 capsule (100 mg total) by mouth 3 (three) times a day   indomethacin (INDOCIN) 25 mg capsule   No No   Si tab BID prn severe headache with food  Hold toradol    ketorolac (TORADOL) 10 mg tablet   No No   Sig: Take 1 tablet (10 mg total) by mouth every 6 (six) hours as needed (migraine) Max 2-3 per week    magnesium Oxide (MAG-OX) 400 mg TABS   No No   Sig: Take 1 tablet (400 mg total) by mouth daily   meclizine (ANTIVERT) 12 5 MG tablet   No No   Sig: Take 1 tablet (12 5 mg total) by mouth 3 (three) times a day as needed for dizziness   metFORMIN (GLUCOPHAGE-XR) 500 mg 24 hr tablet   No No   Sig: Take 2 tablets (1,000 mg total) by mouth daily with breakfast   olmesartan (BENICAR) 20 mg tablet   No No   Sig: Take 1 tablet (20 mg total) by mouth daily   onabotulinumtoxin A (BOTOX) 100 units  Self Yes No   Sig: Inject as directed   ondansetron (ZOFRAN) 4 mg tablet   No No   Sig: Take 1 tablet (4 mg total) by mouth every 6 (six) hours   pantoprazole (PROTONIX) 40 mg tablet   No No   Sig: Take 1 tablet (40 mg total) by mouth daily   prazosin (MINIPRESS) 1 mg capsule   No No   Sig: Take 1 capsule (1 mg total) by mouth daily at bedtime   predniSONE 10 mg tablet   No No   Sig: Take 4 tabs x 3 days, then 3 tabs x 3 days, then 2 tabs x 3 days, then 1 tab x 3 days, then 0 5 tab x 3 days, then stop   verapamil (CALAN) 40 mg tablet   No No   Si tab qhs x 1 week, then BID if tolerated  Facility-Administered Medications Last Administration Doses Remaining   cyanocobalamin injection 1,000 mcg 8/3/2020  8:59 AM    cyanocobalamin injection 1,000 mcg 5/18/2020 11:46 AM    cyanocobalamin injection 1,000 mcg 9/1/2020 11:29 AM           Past Medical History:   Diagnosis Date    Abnormal Pap smear of cervix     Allergic     Anxiety     Arthritis     Depression     Diabetes mellitus ([address] )     Fibromyalgia, primary     Hypertension     IBS (irritable bowel syndrome)     Migraine     Obesity     Vitamin B12 deficiency        Past Surgical History:   Procedure Laterality Date    COLONOSCOPY N/A 5/8/2018    Procedure: COLONOSCOPY;  Surgeon: Silvio Resendiz MD;  Location: [facility]; Service: Gastroenterology    MN EXC SKIN BENIG >4 CM REMAINDR BODY N/A 7/1/2021    Procedure: EXCISION OF PILAR SCALP CYST X 2 AND RIGHT INNER GROIN NEVVUS;  Surgeon: Og Boyd MD;  Location:  MAIN OR;  Service: Plastics    MN RECMPL WND SCALP,EXTR 2 6-7 5 CM N/A 7/1/2021    Procedure: CLOSURE WOUND SCALP X 2 AND RIGHT INNER GROIN;  Surgeon: Og Boyd MD;  Location:  MAIN OR;  Service: Plastics    TONSILLECTOMY      WISDOM TOOTH EXTRACTION         Family History   Problem Relation Age of Onset    Rheum arthritis Mother     Psoriasis Mother     Other Mother     Hypertension Mother     Diabetes unspecified Mother     Sjogren's syndrome Mother     Alcohol abuse Mother     Drug abuse Mother     Anxiety disorder Father     Alcohol abuse Father     Lung cancer Maternal Grandfather     Cancer Other         bone    Diabetes Other     Other Other         High blood pressure    Depression Maternal Grandmother      I have reviewed and agree with the history as documented      E-Cigarette/Vaping    E-Cigarette Use Current Some Day User     Start Date 7/1/19     Comments 1 cartridge every 2 months       E-Cigarette/Vaping Substances    Nicotine No     THC Yes     CBD Yes     Flavoring No     Other No     Unknown No      Social History     Tobacco Use    Smoking status: Never Smoker    Smokeless tobacco: Never Used   Vaping Use    Vaping Use: Some days    Start date: 7/1/2019    Substances: THC, CBD   Substance Use Topics    Alcohol use: Yes     Comment: rarely    Drug use: Yes     Frequency: 2 0 times per week     Types: Marijuana     Comment: medical marijuana (vape)       Review of Systems   Constitutional: Negative  HENT: Negative  Eyes: Negative  Respiratory: Negative  Cardiovascular: Negative  Gastrointestinal: Negative  Endocrine: Negative  Genitourinary: Negative  Musculoskeletal: Negative  Skin: Negative  Allergic/Immunologic: Negative  Neurological: Negative  Hematological: Negative  Psychiatric/Behavioral: Negative  Physical Exam  Physical Exam  Vitals and nursing note reviewed  Constitutional:       General: She is in acute distress  Appearance: She is obese  HENT:      Head: Normocephalic and atraumatic  Right Ear: External ear normal       Left Ear: External ear normal       Nose: Nose normal       Mouth/Throat:      Mouth: Mucous membranes are moist       Pharynx: Oropharynx is clear  Eyes:      Extraocular Movements: Extraocular movements intact  Conjunctiva/sclera: Conjunctivae normal       Pupils: Pupils are equal, round, and reactive to light  Cardiovascular:      Rate and Rhythm: Normal rate and regular rhythm  Pulmonary:      Effort: Pulmonary effort is normal       Breath sounds: Normal breath sounds  Abdominal:      General: Abdomen is flat  Bowel sounds are normal       Palpations: Abdomen is soft  Musculoskeletal:         General: Normal range of motion  Cervical back: Normal range of motion and neck supple  Skin:     General: Skin is warm  Capillary Refill: Capillary refill takes less than 2 seconds  Neurological:      General: No focal deficit present        Mental Status: She is alert and oriented to person, place, and time  Mental status is at baseline  Psychiatric:         Mood and Affect: Mood normal          Behavior: Behavior normal          Thought Content: Thought content normal          Judgment: Judgment normal          Vital Signs  ED Triage Vitals [09/30/22 1411]   Temperature Pulse Respirations Blood Pressure SpO2   98 1 °F (36 7 °C) 100 16 126/59 97 %      Temp src Heart Rate Source Patient Position - Orthostatic VS BP Location FiO2 (%)   -- Monitor Sitting Right arm --      Pain Score       8           Vitals:    09/30/22 1411 09/30/22 1529   BP: 126/59 118/67   Pulse: 100 88   Patient Position - Orthostatic VS: Sitting          Visual Acuity  Visual Acuity    Flowsheet Row Most Recent Value   L Pupil Size (mm) 3   R Pupil Size (mm) 3          ED Medications  Medications   sodium chloride 0 9 % bolus 500 mL (0 mL Intravenous Stopped 9/30/22 1531)   ketorolac (TORADOL) injection 30 mg (30 mg Intravenous Given 9/30/22 1432)   metoclopramide (REGLAN) injection 10 mg (10 mg Intravenous Given 9/30/22 1433)   diphenhydrAMINE (BENADRYL) injection 25 mg (25 mg Intravenous Given 9/30/22 1433)   magnesium sulfate IVPB (premix) SOLN 1 g (0 g Intravenous Stopped 9/30/22 1534)       Diagnostic Studies  Results Reviewed     None                 No orders to display              Procedures  Procedures         ED Course  ED Course as of 09/30/22 1543   Fri Sep 30, 2022   1526 1/10   1526 Patient is nearly pain-free 1/10 pain scale, patient received IV fluid hydration 500 mg of normal saline, magnesium infusion which is half way down currently, Toradol, Reglan, Benadryl and Decadron  Patient need to follow up PCP  Patient stable position  SBIRT 22yo+    Flowsheet Row Most Recent Value   SBIRT (23 yo +)    In order to provide better care to our patients, we are screening all of our patients for alcohol and drug use   Would it be okay to ask you these screening questions? Yes Filed at: 09/30/2022 1419   Initial Alcohol Screen: US AUDIT-C     1  How often do you have a drink containing alcohol? 0 Filed at: 09/30/2022 1419   2  How many drinks containing alcohol do you have on a typical day you are drinking? 0 Filed at: 09/30/2022 1419   3a  Male UNDER 65: How often do you have five or more drinks on one occasion? 0 Filed at: 09/30/2022 1419   3b  FEMALE Any Age, or MALE 65+: How often do you have 4 or more drinks on one occassion? 0 Filed at: 09/30/2022 1419   Audit-C Score 0 Filed at: 09/30/2022 1419   BRUCE: How many times in the past year have you    Used an illegal drug or used a prescription medication for non-medical reasons? Never Filed at: 09/30/2022 1419                    MDM  Number of Diagnoses or Management Options  Chronic headaches  Status migrainosus  Diagnosis management comments: 52-year-old female presents emergency department with chronic headaches, prior recent neuro imaging showed no acute pathology, headache is confined to the left side, after IV fluid hydration, magnesium, Toradol, Decadron, Reglan patient a down to 1/10  Patient stable for discharge  Portions of the record may have been created with voice recognition software  Occasional wrong word or "sound a like" substitutions may have occurred due to the inherent limitations of voice recognition software  Read the chart carefully and recognize, using context, where substitutions have occurred  Counseling: I had a detailed discussion with the patient and/or guardian regarding: the historical points, exam findings, and any diagnostic results supporting the discharge diagnosis, lab results, radiology results, discharge instructions reviewed with patient and/or family/caregiver and understanding was verbalized   Instructions given to return to the emergency department if symptoms worsen or persist, or if there are any questions or concerns that arise at home      This patient was examined during the Covid-19 pandemic, and appropriate PPE was employed as defined by OSHA to minimize exposure to the patient and to avoid spread in the event that I am an asymptomatic carrier  All efforts were made to avoid direct contact with the patient per CDC guidelines ("social distancing") unless otherwise necessary to rule out a medical emergency and/or to provide life-saving interventions  Donning and doffing of PPE was performed per recommended guidelines, and personal PPE was employed if /when institutional PPE was not readily available or was deemed to be less than the recommended as defined by OSHA  Amount and/or Complexity of Data Reviewed  Decide to obtain previous medical records or to obtain history from someone other than the patient: yes  Review and summarize past medical records: yes  Independent visualization of images, tracings, or specimens: yes        Disposition  Final diagnoses:   Chronic headaches   Status migrainosus     Time reflects when diagnosis was documented in both MDM as applicable and the Disposition within this note     Time User Action Codes Description Comment    9/30/2022  3:26 PM Sam Myrna [R51 [id number],  G89 29] Chronic headaches     9/30/2022  3:26 PM Sam Myrna [[id number]] Migraine     9/30/2022  3:26 PM Chaim Calzada [[id number]] Migraine     9/30/2022  3:27 PM Sarah Maxwell [[id number]] Status migrainosus       ED Disposition     ED Disposition   Discharge    Condition   Stable    Date/Time   Fri Sep 30, 2022  3:27 PM    Comment   Sangeetha Lynn discharge to home/self care                 Follow-up Information     Follow up With Specialties Details Why [address] ,  Family [address]  [phone]            Discharge Medication List as of 9/30/2022  3:28 PM      CONTINUE these medications which have NOT CHANGED    Details   Aimovig 140 MG/ML SOAJ Inject 140mg under the skin every 30 (thirty) days  , Normal      ALPRAZolam ER (XANAX XR) 0 5 MG 24 hr tablet Take 1 tablet (0 5 mg total) by mouth every morning, Starting Fri 9/23/2022, Normal      !! B-D 3CC LUER-DEANN SYR 25GX1" 25G X 1" 3 ML MISC  , Starting Thu 7/26/2018, Historical Med      buPROPion (WELLBUTRIN XL) 300 mg 24 hr tablet Take 1 tablet (300 mg total) by mouth daily, Starting Thu 9/22/2022, Normal      celecoxib (CeleBREX) 100 mg capsule Take 1 capsule (100 mg total) by mouth 2 (two) times a day Prn migraine  Hold toradol, indocin , Starting Fri 9/30/2022, Normal      dicyclomine (BENTYL) 20 mg tablet Take 1 tablet (20 mg total) by mouth 2 (two) times a day, Starting Fri 5/27/2022, Normal      dihydroergotamine (MIGRANAL) 4 MG/ML nasal spray 1 spray into each nostril as needed for migraine Use in one nostril as directed  No more than 4 sprays in one hour, Starting Wed 8/31/2022, Normal      diphenoxylate-atropine (LOMOTIL) 2 5-0 025 mg per tablet 1 tablet 4 times daily as needed for diarrhea, Normal      escitalopram (LEXAPRO) 20 mg tablet Take 1 tablet (20 mg total) by mouth daily, Starting Thu 9/22/2022, Normal      etonogestrel-ethinyl estradiol (NuvaRing) 0 12-0 015 MG/24HR vaginal ring Insert ring for 21 days then remove for one week , Normal      gabapentin (Neurontin) 100 mg capsule Take 1 capsule (100 mg total) by mouth 3 (three) times a day, Starting Wed 9/28/2022, Normal      indomethacin (INDOCIN) 25 mg capsule 1 tab BID prn severe headache with food   Hold toradol , Normal      ketorolac (TORADOL) 10 mg tablet Take 1 tablet (10 mg total) by mouth every 6 (six) hours as needed (migraine) Max 2-3 per week , Starting Fri 9/30/2022, Normal      magnesium Oxide (MAG-OX) 400 mg TABS Take 1 tablet (400 mg total) by mouth daily, Starting Sun 8/7/2022, Until Tue 9/6/2022, Normal      meclizine (ANTIVERT) 12 5 MG tablet Take 1 tablet (12 5 mg total) by mouth 3 (three) times a day as needed for dizziness, Starting Wed 8/3/2022, Normal metFORMIN (GLUCOPHAGE-XR) 500 mg 24 hr tablet Take 2 tablets (1,000 mg total) by mouth daily with breakfast, Starting Tue 5/10/2022, Normal      OLANZapine (ZyPREXA) 2 5 mg tablet Take 1 tablet (2 5 mg total) by mouth daily Do not take with reglan , Starting Thu 9/22/2022, Normal      olmesartan (BENICAR) 20 mg tablet Take 1 tablet (20 mg total) by mouth daily, Starting Wed 9/28/2022, Normal      onabotulinumtoxin A (BOTOX) 100 units Inject as directed, Starting Tue 8/29/2017, Historical Med      ondansetron (ZOFRAN) 4 mg tablet Take 1 tablet (4 mg total) by mouth every 6 (six) hours, Starting Fri 5/27/2022, Normal      pantoprazole (PROTONIX) 40 mg tablet Take 1 tablet (40 mg total) by mouth daily, Starting Fri 5/27/2022, Normal      prazosin (MINIPRESS) 1 mg capsule Take 1 capsule (1 mg total) by mouth daily at bedtime, Starting Thu 9/22/2022, Normal      predniSONE 10 mg tablet Take 4 tabs x 3 days, then 3 tabs x 3 days, then 2 tabs x 3 days, then 1 tab x 3 days, then 0 5 tab x 3 days, then stop, Normal      Riboflavin 400 MG CAPS Take 1 capsule (400 mg total) by mouth in the morning, Starting Sun 8/7/2022, Until Tue 9/6/2022, Normal      !! Syringe/Needle, Disp, (SYRINGE 3CC/88QR2-6/4") 27G X 1-1/4" 3 ML MISC Use for IM injection of ketorolac , Normal      verapamil (CALAN) 40 mg tablet 1 tab qhs x 1 week, then BID if tolerated , Normal      Xeljanz XR 11 MG TB24 Starting Mon 8/22/2022, Historical Med       !! - Potential duplicate medications found  Please discuss with provider  No discharge procedures on file      PDMP Review       Value Time User    PDMP Reviewed  Yes 9/23/2022  1:47 PM Merlinda Fang, MD          ED Provider  Electronically Signed by           Ken Kumar III,   09/30/22 8131

## 2022-10-07 ENCOUNTER — TELEPHONE (OUTPATIENT)
Dept: OBGYN CLINIC | Facility: MEDICAL CENTER | Age: 32
End: 2022-10-07

## 2022-10-07 ENCOUNTER — OFFICE VISIT (OUTPATIENT)
Dept: FAMILY MEDICINE CLINIC | Facility: CLINIC | Age: 32
End: 2022-10-07
Payer: COMMERCIAL

## 2022-10-07 VITALS
HEIGHT: 63 IN | TEMPERATURE: 98 F | HEART RATE: 109 BPM | SYSTOLIC BLOOD PRESSURE: 118 MMHG | DIASTOLIC BLOOD PRESSURE: 76 MMHG | WEIGHT: 253 LBS | RESPIRATION RATE: 18 BRPM | BODY MASS INDEX: 44.83 KG/M2 | OXYGEN SATURATION: 98 %

## 2022-10-07 DIAGNOSIS — M26.623 BILATERAL TEMPOROMANDIBULAR JOINT PAIN: Primary | ICD-10-CM

## 2022-10-07 DIAGNOSIS — Z23 ENCOUNTER FOR IMMUNIZATION: ICD-10-CM

## 2022-10-07 PROBLEM — S03.00XA TMJ (DISLOCATION OF TEMPOROMANDIBULAR JOINT): Status: ACTIVE | Noted: 2022-10-07

## 2022-10-07 PROCEDURE — 90471 IMMUNIZATION ADMIN: CPT

## 2022-10-07 PROCEDURE — 99213 OFFICE O/P EST LOW 20 MIN: CPT | Performed by: FAMILY MEDICINE

## 2022-10-07 PROCEDURE — 90686 IIV4 VACC NO PRSV 0.5 ML IM: CPT

## 2022-10-07 RX ORDER — CYCLOBENZAPRINE HCL 5 MG
5 TABLET ORAL 3 TIMES DAILY PRN
Qty: 60 TABLET | Refills: 0 | Status: SHIPPED | OUTPATIENT
Start: 2022-10-07

## 2022-10-07 NOTE — PROGRESS NOTES
Assessment/Plan:  The patient use the cyclobenzaprine and be referred to ENT  Patient use indomethacin as needed  Diagnoses and all orders for this visit:    Bilateral temporomandibular joint pain  -     Ambulatory Referral to Otolaryngology; Future  -     cyclobenzaprine (FLEXERIL) 5 mg tablet; Take 1 tablet (5 mg total) by mouth 3 (three) times a day as needed for muscle spasms    Encounter for immunization  -     Cancel: FLUBLOK: influenza vaccine, quadrivalent, recombinant, PF, 0 5 mL  -     FLUZONE: influenza vaccine, quadrivalent, 0 5 mL            Subjective:        Patient ID: Richard Romero is a 33 y o  female  Pt  Is here with left tmj pain recently and left facial pain  pt  is using tylenol  Pain is constant  Left ear pain also  No dental pain        The following portions of the patient's history were reviewed and updated as appropriate: allergies, current medications, past family history, past medical history, past social history, past surgical history and problem list       Review of Systems   Constitutional: Negative  HENT: Positive for ear pain, postnasal drip, sinus pressure and sinus pain  Eyes: Negative  Respiratory: Negative  Cardiovascular: Negative  Gastrointestinal: Negative  Endocrine: Negative  Genitourinary: Negative  Musculoskeletal: Negative  Skin: Negative  Allergic/Immunologic: Negative  Neurological: Positive for dizziness  Hematological: Negative  Psychiatric/Behavioral: Negative  Objective:               /76 (BP Location: Right arm, Patient Position: Sitting, Cuff Size: Adult)   Pulse (!) 109   Temp 98 °F (36 7 °C) (Tympanic)   Resp 18   Ht 5' 3" (1 6 m)   Wt 115 kg (253 lb)   SpO2 98%   BMI 44 82 kg/m²          Physical Exam  Vitals and nursing note reviewed  Constitutional:       General: She is not in acute distress  Appearance: She is not ill-appearing, toxic-appearing or diaphoretic     HENT: Head: Normocephalic and atraumatic  Right Ear: Tympanic membrane, ear canal and external ear normal  There is no impacted cerumen  Left Ear: Tympanic membrane, ear canal and external ear normal  There is no impacted cerumen  Nose: Nose normal  No congestion or rhinorrhea  Mouth/Throat:      Mouth: Mucous membranes are moist       Pharynx: No oropharyngeal exudate or posterior oropharyngeal erythema  Eyes:      General: No scleral icterus  Right eye: No discharge  Left eye: No discharge  Extraocular Movements: Extraocular movements intact  Conjunctiva/sclera: Conjunctivae normal       Pupils: Pupils are equal, round, and reactive to light  Neck:      Vascular: No carotid bruit  Cardiovascular:      Rate and Rhythm: Normal rate and regular rhythm  Pulses: Normal pulses  Heart sounds: Normal heart sounds  No murmur heard  No friction rub  No gallop  Pulmonary:      Effort: Pulmonary effort is normal  No respiratory distress  Breath sounds: Normal breath sounds  No stridor  No wheezing, rhonchi or rales  Chest:      Chest wall: No tenderness  Musculoskeletal:         General: Tenderness present  No swelling, deformity or signs of injury  Cervical back: Normal range of motion and neck supple  No rigidity  No muscular tenderness  Right lower leg: No edema  Left lower leg: No edema  Lymphadenopathy:      Cervical: No cervical adenopathy  Skin:     General: Skin is warm and dry  Capillary Refill: Capillary refill takes less than 2 seconds  Coloration: Skin is not jaundiced  Findings: No bruising, erythema, lesion or rash  Neurological:      Mental Status: She is alert and oriented to person, place, and time  Mental status is at baseline  Cranial Nerves: No cranial nerve deficit  Sensory: No sensory deficit  Motor: No weakness        Coordination: Coordination normal       Gait: Gait normal  Psychiatric:         Mood and Affect: Mood normal          Behavior: Behavior normal          Thought Content:  Thought content normal          Judgment: Judgment normal

## 2022-10-07 NOTE — TELEPHONE ENCOUNTER
Patient requested a refill through her pharmacy for the nuva ring  The pharmacy called about her medication previously it said brand medically necessary  Pharmacy is requesting a new prescription for the nuva ring with brand medically necessary  Please review when you get a chance   Thank you

## 2022-10-09 ENCOUNTER — OFFICE VISIT (OUTPATIENT)
Dept: URGENT CARE | Facility: CLINIC | Age: 32
End: 2022-10-09
Payer: COMMERCIAL

## 2022-10-09 VITALS
RESPIRATION RATE: 18 BRPM | SYSTOLIC BLOOD PRESSURE: 136 MMHG | HEART RATE: 100 BPM | HEIGHT: 63 IN | OXYGEN SATURATION: 98 % | WEIGHT: 253 LBS | TEMPERATURE: 98.5 F | BODY MASS INDEX: 44.83 KG/M2 | DIASTOLIC BLOOD PRESSURE: 84 MMHG

## 2022-10-09 DIAGNOSIS — R19.7 DIARRHEA, UNSPECIFIED TYPE: ICD-10-CM

## 2022-10-09 DIAGNOSIS — H65.113 ACUTE MUCOID OTITIS MEDIA OF BOTH EARS: Primary | ICD-10-CM

## 2022-10-09 DIAGNOSIS — J02.8 ACUTE PHARYNGITIS DUE TO OTHER SPECIFIED ORGANISMS: ICD-10-CM

## 2022-10-09 LAB — S PYO AG THROAT QL: NEGATIVE

## 2022-10-09 PROCEDURE — 99213 OFFICE O/P EST LOW 20 MIN: CPT | Performed by: NURSE PRACTITIONER

## 2022-10-09 PROCEDURE — 87880 STREP A ASSAY W/OPTIC: CPT | Performed by: NURSE PRACTITIONER

## 2022-10-09 PROCEDURE — 87070 CULTURE OTHR SPECIMN AEROBIC: CPT | Performed by: NURSE PRACTITIONER

## 2022-10-09 RX ORDER — AMOXICILLIN AND CLAVULANATE POTASSIUM 875; 125 MG/1; MG/1
1 TABLET, FILM COATED ORAL EVERY 12 HOURS SCHEDULED
Qty: 20 TABLET | Refills: 0 | Status: SHIPPED | OUTPATIENT
Start: 2022-10-09 | End: 2022-10-19

## 2022-10-09 NOTE — PATIENT INSTRUCTIONS
Your strep A is negative  You have a throat culture pending  You are to download [employer] for the results in 3-4 days  You will be notified if the results are +  You are being prescribed augmentin which will cover strep  You are to do warm salt water gargles 4 x daily  Drink warm tea with honey and lemon  Take tylenol or motrin as able for pain or fever  Chloraseptic throat spray, cough drops  You have bilateral ear infections - the augmentin will cover the infection  Do not share utensils  Change your tooth brush in 3 days  Follow up with your PCP in 2-3 days  Go to the ED if symptoms worsen    You should retest your self for covid in tomorrow or Tuesday  Eat a bland diet, bananas, rice applesauce and toast   Gatorade for hydration  You may try imodium for diarrhea  You are to eat yogurt and take a probiotic as the antibiotic may make the diarrhea worse    These foods will help with GI upset

## 2022-10-09 NOTE — LETTER
October 9, 2022     Patient: Nathan Leon   YOB: 1990   Date of Visit: 10/9/2022       To Whom It May Concern: It is my medical opinion that Phu Jordan may return to work on 10/13/2022  If you have any questions or concerns, please don't hesitate to call           Sincerely,        TENZIN Dave    CC: No Recipients

## 2022-10-09 NOTE — PROGRESS NOTES
[facility]        NAME: Lyn Valle is a 33 y o  female  : 1990    MRN: [id number]  DATE: 2022  TIME: 12:53 PM    Assessment and Plan   Acute mucoid otitis media of both ears [H65 113]  1  Acute mucoid otitis media of both ears  amoxicillin-clavulanate (AUGMENTIN) 875-125 mg per tablet   2  Acute pharyngitis due to other specified organisms  POCT rapid strepA    Throat culture    amoxicillin-clavulanate (AUGMENTIN) 875-125 mg per tablet   3  Diarrhea, unspecified type           Patient Instructions       Follow up with PCP in 3-5 days  Proceed to  ER if symptoms worsen  Your strep A is negative  You have a throat culture pending  You are to download  [employer] for the results in 3-4 days  You will be notified if the results are +  You are being prescribed augmentin which will cover strep  You are to do warm salt water gargles 4 x daily  Drink warm tea with honey and lemon  Take tylenol or motrin as able for pain or fever  Chloraseptic throat spray, cough drops  You have bilateral ear infections - the augmentin will cover the infection  Do not share utensils  Change your tooth brush in 3 days  Follow up with your PCP in 2-3 days  Go to the ED if symptoms worsen    You should retest your self for covid in tomorrow or Tuesday  Eat a bland diet, bananas, rice applesauce and toast   Gatorade for hydration  You may try imodium for diarrhea  You are to eat yogurt and take a probiotic as the antibiotic may make the diarrhea worse  These foods will help with GI upset       Chief Complaint     Chief Complaint   Patient presents with   • Cold Like Symptoms     Body aches, swollen glands, sore throat &back of tongue feels swollen   Started Friday night Covid tested  herself this morning and she was negative          History of Present Illness       This is a 33year old female who states started Friday night with bodyaches, swollen glands, sorethroat, back of tongue feels swollen  She took nyquil with no relief  She states that she did covid test this am and was negative  She states she is covid vaccinated  She denies fevers (but felt warm), n/v  + diarrhea  She states she wears her mask most everywhere she goes  Denies pregnancy  Review of Systems   Review of Systems   Constitutional: Positive for chills  HENT: Positive for congestion, ear pain, sore throat and trouble swallowing  Eyes: Negative  Respiratory: Negative  Cardiovascular: Negative  Gastrointestinal: Positive for diarrhea  Endocrine: Negative  Musculoskeletal: Positive for myalgias  Skin: Negative  Allergic/Immunologic: Negative  Neurological: Negative  Hematological: Negative  Psychiatric/Behavioral: Negative  Current Medications       Current Outpatient Medications:   •  Aimovig 140 MG/ML SOAJ, Inject 140mg under the skin every 30 (thirty) days  , Disp: 1 mL, Rfl: 10  •  ALPRAZolam ER (XANAX XR) 0 5 MG 24 hr tablet, Take 1 tablet (0 5 mg total) by mouth every morning, Disp: 30 tablet, Rfl: 2  •  amoxicillin-clavulanate (AUGMENTIN) 875-125 mg per tablet, Take 1 tablet by mouth every 12 (twelve) hours for 10 days, Disp: 20 tablet, Rfl: 0  •  B-D 3CC LUER-DEANN SYR 25GX1" 25G X 1" 3 ML MISC,  , Disp: , Rfl:   •  buPROPion (WELLBUTRIN XL) 300 mg 24 hr tablet, Take 1 tablet (300 mg total) by mouth daily, Disp: 90 tablet, Rfl: 0  •  celecoxib (CeleBREX) 100 mg capsule, Take 1 capsule (100 mg total) by mouth 2 (two) times a day Prn migraine  Hold toradol, indocin , Disp: 20 capsule, Rfl: 0  •  cyclobenzaprine (FLEXERIL) 5 mg tablet, Take 1 tablet (5 mg total) by mouth 3 (three) times a day as needed for muscle spasms, Disp: 60 tablet, Rfl: 0  •  dihydroergotamine (MIGRANAL) 4 MG/ML nasal spray, 1 spray into each nostril as needed for migraine Use in one nostril as directed    No more than 4 sprays in one hour, Disp: 16 mL, Rfl: 0  •  escitalopram (LEXAPRO) 20 mg tablet, Take 1 tablet (20 mg total) by mouth daily, Disp: 30 tablet, Rfl: 2  •  etonogestrel-ethinyl estradiol (NuvaRing) 0 12-0 015 MG/24HR vaginal ring, Insert ring for 21 days then remove for one week , Disp: 3 each, Rfl: 0  •  gabapentin (Neurontin) 100 mg capsule, Take 1 capsule (100 mg total) by mouth 3 (three) times a day, Disp: 30 capsule, Rfl: 0  •  indomethacin (INDOCIN) 25 mg capsule, 1 tab BID prn severe headache with food  Hold toradol , Disp: 30 capsule, Rfl: 0  •  ketorolac (TORADOL) 10 mg tablet, Take 1 tablet (10 mg total) by mouth every 6 (six) hours as needed (migraine) Max 2-3 per week , Disp: 10 tablet, Rfl: 0  •  OLANZapine (ZyPREXA) 2 5 mg tablet, Take 1 tablet (2 5 mg total) by mouth daily Do not take with reglan , Disp: 30 tablet, Rfl: 2  •  olmesartan (BENICAR) 20 mg tablet, Take 1 tablet (20 mg total) by mouth daily, Disp: 90 tablet, Rfl: 0  •  onabotulinumtoxin A (BOTOX) 100 units, Inject as directed, Disp: , Rfl:   •  ondansetron (ZOFRAN) 4 mg tablet, Take 1 tablet (4 mg total) by mouth every 6 (six) hours, Disp: 30 tablet, Rfl: 2  •  pantoprazole (PROTONIX) 40 mg tablet, Take 1 tablet (40 mg total) by mouth daily, Disp: 30 tablet, Rfl: 3  •  prazosin (MINIPRESS) 1 mg capsule, Take 1 capsule (1 mg total) by mouth daily at bedtime, Disp: 30 capsule, Rfl: 2  •  verapamil (CALAN) 40 mg tablet, 1 tab qhs x 1 week, then BID if tolerated  , Disp: 60 tablet, Rfl: 2  •  dicyclomine (BENTYL) 20 mg tablet, Take 1 tablet (20 mg total) by mouth 2 (two) times a day (Patient not taking: Reported on 10/9/2022), Disp: 60 tablet, Rfl: 1  •  diphenoxylate-atropine (LOMOTIL) 2 5-0 025 mg per tablet, 1 tablet 4 times daily as needed for diarrhea (Patient not taking: Reported on 10/9/2022), Disp: 30 tablet, Rfl: 1  •  magnesium Oxide (MAG-OX) 400 mg TABS, Take 1 tablet (400 mg total) by mouth daily, Disp: 30 tablet, Rfl: 0  •  meclizine (ANTIVERT) 12 5 MG tablet, Take 1 tablet (12 5 mg total) by mouth 3 (three) times a day as needed for dizziness (Patient not taking: Reported on 10/9/2022), Disp: 30 tablet, Rfl: 0  •  metFORMIN (GLUCOPHAGE-XR) 500 mg 24 hr tablet, Take 2 tablets (1,000 mg total) by mouth daily with breakfast (Patient not taking: Reported on 10/9/2022), Disp: 60 tablet, Rfl: 2  •  predniSONE 10 mg tablet, Take 4 tabs x 3 days, then 3 tabs x 3 days, then 2 tabs x 3 days, then 1 tab x 3 days, then 0 5 tab x 3 days, then stop (Patient not taking: Reported on 10/9/2022), Disp: 32 tablet, Rfl: 0  •  Riboflavin 400 MG CAPS, Take 1 capsule (400 mg total) by mouth in the morning, Disp: 30 capsule, Rfl: 0  •  Syringe/Needle, Disp, (SYRINGE 3CC/34MJ7-1/4") 27G X 1-1/4" 3 ML MISC, Use for IM injection of ketorolac   (Patient not taking: Reported on 10/9/2022), Disp: 4 each, Rfl: 0  •  Xeljanz XR 11 MG TB24, , Disp: , Rfl:     Current Facility-Administered Medications:   •  cyanocobalamin injection 1,000 mcg, 1,000 mcg, Intramuscular, Q30 Days, Shawn Na, DO, 1,000 mcg at 08/03/20 4511  •  cyanocobalamin injection 1,000 mcg, 1,000 mcg, Intramuscular, Q14 Days, Shawn Na, DO, 1,000 mcg at 05/18/20 1146  •  cyanocobalamin injection 1,000 mcg, 1,000 mcg, Intramuscular, Q30 Days, Shawn Na, DO, 1,000 mcg at 09/01/20 1129    Current Allergies     Allergies as of 10/09/2022 - Reviewed 10/09/2022   Allergen Reaction Noted   • Cimzia [certolizumab pegol] Swelling 03/22/2022   • Desvenlafaxine  11/26/2012   • Ixekizumab Vomiting 05/27/2022   • Valproic acid Other (See Comments) 10/18/2017   • Tizanidine Anxiety 01/20/2021            The following portions of the patient's history were reviewed and updated as appropriate: allergies, current medications, past family history, past medical history, past social history, past surgical history and problem list      Past Medical History:   Diagnosis Date   • Abnormal Pap smear of cervix    • Allergic    • Anxiety    • Arthritis    • Depression    • Diabetes mellitus ([facility] [address] )    • Fibromyalgia, primary    • Hypertension    • IBS (irritable bowel syndrome)    • Migraine    • Obesity    • Vitamin B12 deficiency        Past Surgical History:   Procedure Laterality Date   • COLONOSCOPY N/A 5/8/2018    Procedure: COLONOSCOPY;  Surgeon: Juanjose Weston MD;  Location: [facility]; Service: Gastroenterology   • VA EXC SKIN BENIG >4 CM REMAINDR BODY N/A 7/1/2021    Procedure: EXCISION OF PILAR SCALP CYST X 2 AND RIGHT INNER GROIN NEVVUS;  Surgeon: Reena Woodruff MD;  Location: [address] [place] [address];  Service: Plastics   • VA RECMPL WND SCALP,EXTR 2 6-7 5 CM N/A 7/1/2021    Procedure: CLOSURE WOUND SCALP X 2 AND RIGHT INNER GROIN;  Surgeon: Reena Woodruff MD;  Location: [address] [place] [address];  Service: Plastics   • TONSILLECTOMY     • WISDOM TOOTH EXTRACTION         Family History   Problem Relation Age of Onset   • Rheum arthritis Mother    • Psoriasis Mother    • Other Mother    • Hypertension Mother    • Diabetes unspecified Mother    • Sjogren's syndrome Mother    • Alcohol abuse Mother    • Drug abuse Mother    • Anxiety disorder Father    • Alcohol abuse Father    • Lung cancer Maternal Grandfather    • Cancer Other         bone   • Diabetes Other    • Other Other         High blood pressure   • Depression Maternal Grandmother          Medications have been verified  Objective   /84   Pulse 100   Temp 98 5 °F (36 9 °C)   Resp 18   Ht 5' 3" (1 6 m)   Wt 115 kg (253 lb)   SpO2 98%   BMI 44 82 kg/m²   No LMP recorded  (Menstrual status: Birth Control)  Physical Exam     Physical Exam  Vitals and nursing note reviewed  Constitutional:       General: She is not in acute distress  Appearance: Normal appearance  She is obese  She is not ill-appearing, toxic-appearing or diaphoretic  HENT:      Head: Normocephalic and atraumatic  Ears:      Comments: Bilateral TMs mucoid fluid noted        Nose: Congestion present  No rhinorrhea  Mouth/Throat:      Lips: Pink        Mouth: Mucous membranes are moist       Tongue: No lesions  Tongue does not deviate from midline  Palate: No mass and lesions  Pharynx: Oropharynx is clear  Uvula midline  Posterior oropharyngeal erythema present  No oropharyngeal exudate or uvula swelling  Tonsils: No tonsillar exudate or tonsillar abscesses  0 on the right  0 on the left  Comments: Oral airway patent  Mallampati 2  No drooling  She is swallowing her own saliva     Eyes:      Extraocular Movements: Extraocular movements intact  Neck:      Comments: Right anterior cervical adenopathy with tenderness   Cardiovascular:      Rate and Rhythm: Normal rate and regular rhythm  Pulses: Normal pulses  Heart sounds: Normal heart sounds  Pulmonary:      Effort: Pulmonary effort is normal  No respiratory distress  Breath sounds: Normal breath sounds  No stridor  No wheezing, rhonchi or rales  Chest:      Chest wall: No tenderness  Abdominal:      General: There is no distension  Palpations: Abdomen is soft  Tenderness: There is no abdominal tenderness  Musculoskeletal:         General: Normal range of motion  Cervical back: Normal range of motion and neck supple  Lymphadenopathy:      Cervical: Cervical adenopathy present  Skin:     General: Skin is warm and dry  Capillary Refill: Capillary refill takes less than 2 seconds  Neurological:      General: No focal deficit present  Mental Status: She is alert and oriented to person, place, and time  Psychiatric:         Mood and Affect: Mood normal          Behavior: Behavior normal          Thought Content: Thought content normal          Judgment: Judgment normal          Strep A negative  Throat culture pending

## 2022-10-10 ENCOUNTER — HOSPITAL ENCOUNTER (EMERGENCY)
Facility: HOSPITAL | Age: 32
Discharge: HOME | End: 2022-10-10
Attending: EMERGENCY MEDICINE
Payer: COMMERCIAL

## 2022-10-10 VITALS
HEART RATE: 97 BPM | RESPIRATION RATE: 17 BRPM | OXYGEN SATURATION: 97 % | SYSTOLIC BLOOD PRESSURE: 141 MMHG | TEMPERATURE: 98.4 F | DIASTOLIC BLOOD PRESSURE: 74 MMHG

## 2022-10-10 DIAGNOSIS — G43.909 MIGRAINE HEADACHE: Primary | ICD-10-CM

## 2022-10-10 PROCEDURE — 96375 TX/PRO/DX INJ NEW DRUG ADDON: CPT

## 2022-10-10 PROCEDURE — 99283 EMERGENCY DEPT VISIT LOW MDM: CPT

## 2022-10-10 PROCEDURE — 96374 THER/PROPH/DIAG INJ IV PUSH: CPT

## 2022-10-10 PROCEDURE — 99284 EMERGENCY DEPT VISIT MOD MDM: CPT | Performed by: EMERGENCY MEDICINE

## 2022-10-10 RX ORDER — DIPHENHYDRAMINE HYDROCHLORIDE 50 MG/ML
25 INJECTION INTRAMUSCULAR; INTRAVENOUS ONCE
Status: COMPLETED | OUTPATIENT
Start: 2022-10-10 | End: 2022-10-10

## 2022-10-10 RX ORDER — DEXAMETHASONE SODIUM PHOSPHATE 4 MG/ML
8 INJECTION, SOLUTION INTRA-ARTICULAR; INTRALESIONAL; INTRAMUSCULAR; INTRAVENOUS; SOFT TISSUE ONCE
Status: COMPLETED | OUTPATIENT
Start: 2022-10-10 | End: 2022-10-10

## 2022-10-10 RX ORDER — KETOROLAC TROMETHAMINE 30 MG/ML
15 INJECTION, SOLUTION INTRAMUSCULAR; INTRAVENOUS ONCE
Status: COMPLETED | OUTPATIENT
Start: 2022-10-10 | End: 2022-10-10

## 2022-10-10 RX ORDER — METOCLOPRAMIDE HYDROCHLORIDE 5 MG/ML
10 INJECTION INTRAMUSCULAR; INTRAVENOUS ONCE
Status: COMPLETED | OUTPATIENT
Start: 2022-10-10 | End: 2022-10-10

## 2022-10-10 RX ADMIN — KETOROLAC TROMETHAMINE 15 MG: 30 INJECTION, SOLUTION INTRAMUSCULAR at 19:13

## 2022-10-10 RX ADMIN — SODIUM CHLORIDE 500 ML: 0.9 INJECTION, SOLUTION INTRAVENOUS at 19:12

## 2022-10-10 RX ADMIN — METOCLOPRAMIDE HYDROCHLORIDE 10 MG: 5 INJECTION INTRAMUSCULAR; INTRAVENOUS at 19:13

## 2022-10-10 RX ADMIN — DEXAMETHASONE SODIUM PHOSPHATE 8 MG: 4 INJECTION, SOLUTION INTRAMUSCULAR; INTRAVENOUS at 19:12

## 2022-10-10 RX ADMIN — DIPHENHYDRAMINE HYDROCHLORIDE 25 MG: 50 INJECTION, SOLUTION INTRAMUSCULAR; INTRAVENOUS at 19:13

## 2022-10-10 NOTE — ED PROVIDER NOTES
History  Chief Complaint   Patient presents with   • Migraine     Pt reports she has had a migraine since getting her flu shot friday     HPI      This is a very pleasant, nontoxic 36-year-old female presents the emergency department with a longstanding history of well documented migraines, fall closely by Neurology, prior neuro imaging in the end of August of this year MRI of the brain showed no acute pathology  History of chronic migraines, mostly recently seen in the emergency department on the 30th September 2022 by my self, treated with IV medications and subsequently discharged  Patient reports that the headache is on the left side typical of her migraine headaches, not abrupt in onset, no history of trauma and started 4 days ago after receiving her flu shot  Patient has an appointment with Neurology at the end of the month  History of depression  Headaches confined to the left side of her head with tenderness, visual changes, less nausea the normal when she has her migraine headaches  Patient recently was seen in the emergency department 2 days ago for a migraine headache  No history of fever, chills, sore throat, syncope, reports this is like her normal prior headaches  Prior to Admission Medications   Prescriptions Last Dose Informant Patient Reported? Taking? ALPRAZolam ER (XANAX XR) 0 5 MG 24 hr tablet   No No   Sig: Take 1 tablet (0 5 mg total) by mouth every morning   Aimovig 140 MG/ML SOAJ  Self No No   Sig: Inject 140mg under the skin every 30 (thirty) days  B-D 3CC LUER-DEANN SYR 25GX1" 25G X 1" 3 ML MISC  Self Yes No   Sig:     OLANZapine (ZyPREXA) 2 5 mg tablet   No No   Sig: Take 1 tablet (2 5 mg total) by mouth daily Do not take with reglan  Riboflavin 400 MG CAPS   No No   Sig: Take 1 capsule (400 mg total) by mouth in the morning   Syringe/Needle, Disp, (SYRINGE 3CC/03UA0-9/4") 27G X 1-1/4" 3 ML MISC  Self No No   Sig: Use for IM injection of ketorolac     Patient not taking: Reported on 10/9/2022   Xeljanz XR 11 MG TB24   Yes No   Patient not taking: Reported on 10/9/2022   amoxicillin-clavulanate (AUGMENTIN) 875-125 mg per tablet   No No   Sig: Take 1 tablet by mouth every 12 (twelve) hours for 10 days   buPROPion (WELLBUTRIN XL) 300 mg 24 hr tablet   No No   Sig: Take 1 tablet (300 mg total) by mouth daily   celecoxib (CeleBREX) 100 mg capsule   No No   Sig: Take 1 capsule (100 mg total) by mouth 2 (two) times a day Prn migraine  Hold toradol, indocin  cyclobenzaprine (FLEXERIL) 5 mg tablet   No No   Sig: Take 1 tablet (5 mg total) by mouth 3 (three) times a day as needed for muscle spasms   dicyclomine (BENTYL) 20 mg tablet   No No   Sig: Take 1 tablet (20 mg total) by mouth 2 (two) times a day   Patient not taking: Reported on 10/9/2022   dihydroergotamine (MIGRANAL) 4 MG/ML nasal spray   No No   Si spray into each nostril as needed for migraine Use in one nostril as directed  No more than 4 sprays in one hour   diphenoxylate-atropine (LOMOTIL) 2 5-0 025 mg per tablet  Self No No   Si tablet 4 times daily as needed for diarrhea   Patient not taking: Reported on 10/9/2022   escitalopram (LEXAPRO) 20 mg tablet   No No   Sig: Take 1 tablet (20 mg total) by mouth daily   etonogestrel-ethinyl estradiol (NuvaRing) 0 12-0 015 MG/24HR vaginal ring   No No   Sig: Insert ring for 21 days then remove for one week    gabapentin (Neurontin) 100 mg capsule   No No   Sig: Take 1 capsule (100 mg total) by mouth 3 (three) times a day   indomethacin (INDOCIN) 25 mg capsule   No No   Si tab BID prn severe headache with food   Hold toradol    ketorolac (TORADOL) 10 mg tablet   No No   Sig: Take 1 tablet (10 mg total) by mouth every 6 (six) hours as needed (migraine) Max 2-3 per week    magnesium Oxide (MAG-OX) 400 mg TABS   No No   Sig: Take 1 tablet (400 mg total) by mouth daily   meclizine (ANTIVERT) 12 5 MG tablet   No No   Sig: Take 1 tablet (12 5 mg total) by mouth 3 (three) times a day as needed for dizziness   Patient not taking: Reported on 10/9/2022   metFORMIN (GLUCOPHAGE-XR) 500 mg 24 hr tablet   No No   Sig: Take 2 tablets (1,000 mg total) by mouth daily with breakfast   Patient not taking: Reported on 10/9/2022   olmesartan (BENICAR) 20 mg tablet   No No   Sig: Take 1 tablet (20 mg total) by mouth daily   onabotulinumtoxin A (BOTOX) 100 units  Self Yes No   Sig: Inject as directed   ondansetron (ZOFRAN) 4 mg tablet   No No   Sig: Take 1 tablet (4 mg total) by mouth every 6 (six) hours   pantoprazole (PROTONIX) 40 mg tablet   No No   Sig: Take 1 tablet (40 mg total) by mouth daily   prazosin (MINIPRESS) 1 mg capsule   No No   Sig: Take 1 capsule (1 mg total) by mouth daily at bedtime   predniSONE 10 mg tablet   No No   Sig: Take 4 tabs x 3 days, then 3 tabs x 3 days, then 2 tabs x 3 days, then 1 tab x 3 days, then 0 5 tab x 3 days, then stop   Patient not taking: Reported on 10/9/2022   verapamil (CALAN) 40 mg tablet   No No   Si tab qhs x 1 week, then BID if tolerated  Facility-Administered Medications Last Administration Doses Remaining   cyanocobalamin injection 1,000 mcg 8/3/2020  8:59 AM    cyanocobalamin injection 1,000 mcg 2020 11:46 AM    cyanocobalamin injection 1,000 mcg 2020 11:29 AM           Past Medical History:   Diagnosis Date   • Abnormal Pap smear of cervix    • Allergic    • Anxiety    • Arthritis    • Depression    • Diabetes mellitus ([facility] [address] )    • Fibromyalgia, primary    • Hypertension    • IBS (irritable bowel syndrome)    • Migraine    • Obesity    • Vitamin B12 deficiency        Past Surgical History:   Procedure Laterality Date   • COLONOSCOPY N/A 2018    Procedure: COLONOSCOPY;  Surgeon: Viviana Torres MD;  Location: [facility] GI LAB;   Service: Gastroenterology   • NV EXC SKIN BENIG >4 CM REMAINDR BODY N/A 2021    Procedure: EXCISION OF PILAR SCALP CYST X 2 AND RIGHT INNER GROIN NEVVUS;  Surgeon: Juliette Lassiter MD;  Location:  MAIN OR; Service: Plastics   • AL RECMPL WND SCALP,EXTR 2 6-7 5 CM N/A 7/1/2021    Procedure: CLOSURE WOUND SCALP X 2 AND RIGHT INNER GROIN;  Surgeon: Sourav Gray MD;  Location: [address] [place] [address] OR;  Service: Plastics   • TONSILLECTOMY     • WISDOM TOOTH EXTRACTION         Family History   Problem Relation Age of Onset   • Rheum arthritis Mother    • Psoriasis Mother    • Other Mother    • Hypertension Mother    • Diabetes unspecified Mother    • Sjogren's syndrome Mother    • Alcohol abuse Mother    • Drug abuse Mother    • Anxiety disorder Father    • Alcohol abuse Father    • Lung cancer Maternal Grandfather    • Cancer Other         bone   • Diabetes Other    • Other Other         High blood pressure   • Depression Maternal Grandmother      I have reviewed and agree with the history as documented  E-Cigarette/Vaping   • E-Cigarette Use Current Some Day User    • Start Date 7/1/19    • Comments 1 cartridge every 2 months       E-Cigarette/Vaping Substances   • Nicotine No    • THC Yes    • CBD Yes    • Flavoring No    • Other No    • Unknown No      Social History     Tobacco Use   • Smoking status: Current Every Day Smoker     Types: Cigarettes   • Smokeless tobacco: Never Used   Vaping Use   • Vaping Use: Some days   • Start date: 7/1/2019   • Substances: THC, CBD   Substance Use Topics   • Alcohol use: Yes     Comment: rarely   • Drug use: Yes     Frequency: 2 0 times per week     Types: Marijuana     Comment: medical marijuana (vape)       Review of Systems   Constitutional: Negative  HENT: Negative  Eyes: Negative  Respiratory: Negative  Cardiovascular: Negative  Gastrointestinal: Negative  Endocrine: Negative  Genitourinary: Negative  Musculoskeletal: Negative  Skin: Negative  Allergic/Immunologic: Negative  Neurological: Positive for headaches  Negative for dizziness, tremors, seizures, syncope, speech difficulty, weakness and numbness  Hematological: Negative  Psychiatric/Behavioral: Negative  Physical Exam  Physical Exam  Vitals and nursing note reviewed  Constitutional:       Appearance: She is normal weight  HENT:      Head: Normocephalic and atraumatic  Right Ear: Tympanic membrane normal       Left Ear: Tympanic membrane normal       Nose: Nose normal       Mouth/Throat:      Mouth: Mucous membranes are moist       Pharynx: Oropharynx is clear  Eyes:      Extraocular Movements: Extraocular movements intact  Conjunctiva/sclera: Conjunctivae normal       Pupils: Pupils are equal, round, and reactive to light  Cardiovascular:      Rate and Rhythm: Normal rate and regular rhythm  Pulses: Normal pulses  Heart sounds: Normal heart sounds  Pulmonary:      Effort: Pulmonary effort is normal       Breath sounds: Normal breath sounds  Abdominal:      General: Abdomen is flat  Bowel sounds are normal    Musculoskeletal:         General: Normal range of motion  Skin:     General: Skin is warm  Capillary Refill: Capillary refill takes less than 2 seconds  Neurological:      General: No focal deficit present  Mental Status: She is alert and oriented to person, place, and time  Mental status is at baseline  Psychiatric:         Mood and Affect: Mood normal          Behavior: Behavior normal          Thought Content:  Thought content normal          Judgment: Judgment normal          Vital Signs  ED Triage Vitals [10/10/22 1835]   Temperature Pulse Respirations Blood Pressure SpO2   98 4 °F (36 9 °C) 97 17 141/74 97 %      Temp Source Heart Rate Source Patient Position - Orthostatic VS BP Location FiO2 (%)   Tympanic Monitor -- Right arm --      Pain Score       7           Vitals:    10/10/22 1835   BP: 141/74   Pulse: 97         Visual Acuity      ED Medications  Medications   dexamethasone (DECADRON) injection 8 mg (8 mg Intravenous Given 10/10/22 1912)   ketorolac (TORADOL) injection 15 mg (15 mg Intravenous Given 10/10/22 1913)   metoclopramide (REGLAN) injection 10 mg (10 mg Intravenous Given 10/10/22 1913)   diphenhydrAMINE (BENADRYL) injection 25 mg (25 mg Intravenous Given 10/10/22 1913)   sodium chloride 0 9 % bolus 500 mL (0 mL Intravenous Stopped 10/10/22 2001)       Diagnostic Studies  Results Reviewed     None                 No orders to display              Procedures  Procedures         ED Course  ED Course as of 10/10/22 2021   [facility] Oct 10, 2022   8137 Patient is pain-free currently after Toradol, Reglan, Decadron and Benadryl  Patient stable for discharge  MDM  Number of Diagnoses or Management Options  Migraine headache  Diagnosis management comments: 81-year-old female presents emergency department with a longstanding history well documented migraines, pulse of Neurology, most recent imaging was August of this year MRI of the brain, no acute abnormalities, had a flu shot on last week, started having symptoms consistent with a typical migraine left-sided minimal photophobia, patient given IV fluids, Toradol, Reglan, Benadryl, Decadron  Pain level went down to a 0/10 comes patient stable at discharge  Counseling: I had a detailed discussion with the patient and/or guardian regarding: the historical points, exam findings, and any diagnostic results supporting the discharge diagnosis, lab results, radiology results, discharge instructions reviewed with patient and/or family/caregiver and understanding was verbalized  Instructions given to return to the emergency department if symptoms worsen or persist, or if there are any questions or concerns that arise at home      Portions of the record may have been created with voice recognition software  Occasional wrong word or "sound a like" substitutions may have occurred due to the inherent limitations of voice recognition software  Read the chart carefully and recognize, using context, where substitutions have occurred      This patient was examined during the Covid-19 pandemic, and appropriate PPE was employed as defined by OSHA to minimize exposure to the patient and to avoid spread in the event that I am an asymptomatic carrier  All efforts were made to avoid direct contact with the patient per CDC guidelines ("social distancing") unless otherwise necessary to rule out a medical emergency and/or to provide life-saving interventions  Donning and doffing of PPE was performed per recommended guidelines, and personal PPE was employed if /when institutional PPE was not readily available or was deemed to be less than the recommended as defined by OSHA  Amount and/or Complexity of Data Reviewed  Decide to obtain previous medical records or to obtain history from someone other than the patient: yes  Review and summarize past medical records: yes  Independent visualization of images, tracings, or specimens: yes        Disposition  Final diagnoses:   Migraine headache     Time reflects when diagnosis was documented in both MDM as applicable and the Disposition within this note     Time User Action Codes Description Comment    10/10/2022  7:41 PM Evelyn Babinski, [id number] Casandra Sterling [[id number]] Migraine headache       ED Disposition     ED Disposition   Discharge    Condition   Stable    Date/Time   Mon Oct 10, 2022  7:41 PM    Comment   Sourav Mac discharge to home/self care  Follow-up Information     Follow up With Specialties Details Why [address] [place] [address],  Family Medicine   [address] [place] [address]  [phone]            Discharge Medication List as of 10/10/2022  7:41 PM      CONTINUE these medications which have NOT CHANGED    Details   Aimovig 140 MG/ML SOAJ Inject 140mg under the skin every 30 (thirty) days  , Normal      ALPRAZolam ER (XANAX XR) 0 5 MG 24 hr tablet Take 1 tablet (0 5 mg total) by mouth every morning, Starting Fri 9/23/2022, Normal      amoxicillin-clavulanate (AUGMENTIN) 875-125 mg per tablet Take 1 tablet by mouth every 12 (twelve) hours for 10 days, Starting Sun 10/9/2022, Until Wed 10/19/2022, Normal      !! B-D 3CC LUER-DEANN SYR 25GX1" 25G X 1" 3 ML MISC  , Starting Thu 7/26/2018, Historical Med      buPROPion (WELLBUTRIN XL) 300 mg 24 hr tablet Take 1 tablet (300 mg total) by mouth daily, Starting Thu 9/22/2022, Normal      celecoxib (CeleBREX) 100 mg capsule Take 1 capsule (100 mg total) by mouth 2 (two) times a day Prn migraine  Hold toradol, indocin , Starting Fri 9/30/2022, Normal      cyclobenzaprine (FLEXERIL) 5 mg tablet Take 1 tablet (5 mg total) by mouth 3 (three) times a day as needed for muscle spasms, Starting Fri 10/7/2022, Normal      dicyclomine (BENTYL) 20 mg tablet Take 1 tablet (20 mg total) by mouth 2 (two) times a day, Starting Fri 5/27/2022, Normal      dihydroergotamine (MIGRANAL) 4 MG/ML nasal spray 1 spray into each nostril as needed for migraine Use in one nostril as directed  No more than 4 sprays in one hour, Starting Wed 8/31/2022, Normal      diphenoxylate-atropine (LOMOTIL) 2 5-0 025 mg per tablet 1 tablet 4 times daily as needed for diarrhea, Normal      escitalopram (LEXAPRO) 20 mg tablet Take 1 tablet (20 mg total) by mouth daily, Starting Thu 9/22/2022, Normal      etonogestrel-ethinyl estradiol (NuvaRing) 0 12-0 015 MG/24HR vaginal ring Insert ring for 21 days then remove for one week , Normal      gabapentin (Neurontin) 100 mg capsule Take 1 capsule (100 mg total) by mouth 3 (three) times a day, Starting Wed 9/28/2022, Normal      indomethacin (INDOCIN) 25 mg capsule 1 tab BID prn severe headache with food   Hold toradol , Normal      ketorolac (TORADOL) 10 mg tablet Take 1 tablet (10 mg total) by mouth every 6 (six) hours as needed (migraine) Max 2-3 per week , Starting Fri 9/30/2022, Normal      magnesium Oxide (MAG-OX) 400 mg TABS Take 1 tablet (400 mg total) by mouth daily, Starting Sun 8/7/2022, Until Tue 9/6/2022, Normal      meclizine (ANTIVERT) 12 5 MG tablet Take 1 tablet (12 5 mg total) by mouth 3 (three) times a day as needed for dizziness, Starting Wed 8/3/2022, Normal      metFORMIN (GLUCOPHAGE-XR) 500 mg 24 hr tablet Take 2 tablets (1,000 mg total) by mouth daily with breakfast, Starting Tue 5/10/2022, Normal      OLANZapine (ZyPREXA) 2 5 mg tablet Take 1 tablet (2 5 mg total) by mouth daily Do not take with reglan , Starting Thu 9/22/2022, Normal      olmesartan (BENICAR) 20 mg tablet Take 1 tablet (20 mg total) by mouth daily, Starting Wed 9/28/2022, Normal      onabotulinumtoxin A (BOTOX) 100 units Inject as directed, Starting Tue 8/29/2017, Historical Med      ondansetron (ZOFRAN) 4 mg tablet Take 1 tablet (4 mg total) by mouth every 6 (six) hours, Starting Fri 5/27/2022, Normal      pantoprazole (PROTONIX) 40 mg tablet Take 1 tablet (40 mg total) by mouth daily, Starting Fri 5/27/2022, Normal      prazosin (MINIPRESS) 1 mg capsule Take 1 capsule (1 mg total) by mouth daily at bedtime, Starting Thu 9/22/2022, Normal      predniSONE 10 mg tablet Take 4 tabs x 3 days, then 3 tabs x 3 days, then 2 tabs x 3 days, then 1 tab x 3 days, then 0 5 tab x 3 days, then stop, Normal      Riboflavin 400 MG CAPS Take 1 capsule (400 mg total) by mouth in the morning, Starting Sun 8/7/2022, Until Tue 9/6/2022, Normal      !! Syringe/Needle, Disp, (SYRINGE 3CC/43GP4-5/4") 27G X 1-1/4" 3 ML MISC Use for IM injection of ketorolac , Normal      verapamil (CALAN) 40 mg tablet 1 tab qhs x 1 week, then BID if tolerated , Normal      Xeljanz XR 11 MG TB24 Starting Mon 8/22/2022, Historical Med       !! - Potential duplicate medications found  Please discuss with provider  No discharge procedures on file      PDMP Review       Value Time User    PDMP Reviewed  Yes 9/23/2022  1:47 PM Carl Frank MD          ED Provider  Electronically Signed by           Gwen Hinton III, DO  10/10/22 2021

## 2022-10-12 PROBLEM — A09 DIARRHEA OF INFECTIOUS ORIGIN: Status: RESOLVED | Noted: 2022-01-11 | Resolved: 2022-10-12

## 2022-10-12 PROBLEM — J01.00 ACUTE NON-RECURRENT MAXILLARY SINUSITIS: Status: RESOLVED | Noted: 2022-01-28 | Resolved: 2022-10-12

## 2022-10-12 LAB — BACTERIA THROAT CULT: NORMAL

## 2022-10-13 ENCOUNTER — TELEPHONE (OUTPATIENT)
Dept: BEHAVIORAL/MENTAL HEALTH CLINIC | Facility: CLINIC | Age: 32
End: 2022-10-13

## 2022-10-13 NOTE — TELEPHONE ENCOUNTER
Kris email from client, requesting change from virtual visit to in-person for Friday, October 14th  Change made in Epic

## 2022-10-14 ENCOUNTER — TELEMEDICINE (OUTPATIENT)
Dept: BEHAVIORAL/MENTAL HEALTH CLINIC | Facility: CLINIC | Age: 32
End: 2022-10-14
Payer: COMMERCIAL

## 2022-10-14 DIAGNOSIS — F41.1 GENERALIZED ANXIETY DISORDER: Chronic | ICD-10-CM

## 2022-10-14 DIAGNOSIS — F33.1 DEPRESSION, MAJOR, RECURRENT, MODERATE (HCC): Primary | Chronic | ICD-10-CM

## 2022-10-14 PROCEDURE — 90834 PSYTX W PT 45 MINUTES: CPT | Performed by: SOCIAL WORKER

## 2022-10-14 NOTE — PSYCH
Virtual Regular Visit    Verification of patient location:    Patient is located in the following state in which I hold an active license [place]    Assessment/Plan:    Problem List Items Addressed This Visit        Other    Generalized anxiety disorder (Chronic)    Depression, major, recurrent, moderate (HCC) - Primary (Chronic)        Goals addressed in session: Goal 1      Reason for visit is No chief complaint on file  Encounter provider APARNA Garcia    Provider located at [address] [place] [id number] [phone]    Recent Visits  Date Type Provider Dept   10/13/22 Telephone Checo Gonsales, [address] [facility]   10/07/22 Office Visit Trinity Cordero DO [facility] [facility] recent visits within past 7 days and meeting all other requirements  Future Appointments  No visits were found meeting these conditions  Showing future appointments within next 150 days and meeting all other requirements       The patient was identified by name and date of birth  Rochebrianna Ziegler was informed that this is a telemedicine visit and that the visit is being conducted throughEpic Embedded and patient was informed this is a secure, HIPAA-complaint platform  She agrees to proceed     My office door was closed  No one else was in the room  She acknowledged consent and understanding of privacy and security of the video platform  The patient has agreed to participate and understands they can discontinue the visit at any time  Patient is aware this is a billable service  Willowcaty Maria is a 33 y o  female  DATA: Met with Stephanie for scheduled individual session  Topics of discussion included relationships with family, trauma history, physical health concerns, relationships with friends and mood regulation and symptoms   "It's always something with me " [facility] discussed her physical health issues  She states that she had an adverse reaction to her flu shot last week  She states that she has been in a flare  She states that she has pushed herself beyond her physical limit on a couple of occasions  We spent some time discussing the initiation of mindful self-checks on her physical and emotional status, while she is out of her home  Agustin Abarca states that she has been eating more ("too much")  She states that she was eating more than three meals per day  She identifies this as being a "comfort thing" rather than true hunger  She was on steroids during this time  She has stopped the steroids at this point, as she recognizes that this further exacerbates her hunger and eating  She states that she has "missed a lot of nightly pills " She states that this includes many of her psychiatric medications and her new migraine medication  A brief review of Stephanie's medication list indicated that she is currently on three different medications that treat hypertension  She agreed to call her physician to follow up and make sure that these medications are not contraindicated  Agustin Abarca discussed some relationships-- friendships, etc-- and her thoughts/feelings related to these relationships  Agustin Abarca discussed a conflict with her best friend  She states, "I don't know how to handle it " She states that it is difficult for her to have a discussion with her friend; however, she feels that she needs to have an open and honest discussion with her  Client shows evidence of utilizing Mindfulness-based strategies and distress tolerance skills skills to manage mental health symptoms  During this session, this clinician used the following therapeutic modalities: supportive psychotherapy, client-centered therapy, mindfulness-based strategies, DBT-informed skills, Motivational Interviewing and solution-focused therapy  ASSESSMENT: Agustin Abarca presents with a somewhat dysthymic mood   Her affect is normal range and intensity, appropriate  Nae Barr exhibits good therapeutic rapport with this clinician  Nae Barr continues to exhibit willingness to work on treatment goals and objectives  Nae Barr presents with a minimal risk of suicide, minimal risk of self-harm, and minimal risk of harm to others  PLAN: Nae Barr will return in approximately two weeks for the next scheduled session  Between sessions, Nae Barr will continue to monitor her medical status and the correlations with her physical health concerns  She will call her physician to discuss her medical issues andto discuss her medications  Nae Barr will practice mindfulness of her body-sensations when she is reaching her physical limits  She will report back during the next session re: successes and barriers  At the next session, this clinician will use supportive psychotherapy, client-centered therapy, mindfulness-based strategies, DBT-informed skills, Motivational Interviewing and solution-focused therapy to address her mood regulation and relationship concerns, in an effort to assist Nae Barr with meeting treatment goals  HPI     Past Medical History:   Diagnosis Date   • Abnormal Pap smear of cervix    • Allergic    • Anxiety    • Arthritis    • Depression    • Diabetes mellitus ([address] )    • Fibromyalgia, primary    • Hypertension    • IBS (irritable bowel syndrome)    • Migraine    • Obesity    • Vitamin B12 deficiency        Past Surgical History:   Procedure Laterality Date   • COLONOSCOPY N/A 5/8/2018    Procedure: COLONOSCOPY;  Surgeon: Jaki Page MD;  Location: [facility];   Service: Gastroenterology   • WA EXC SKIN BENIG >4 CM REMAINDR BODY N/A 7/1/2021    Procedure: EXCISION OF PILAR SCALP CYST X 2 AND RIGHT INNER GROIN NEVVUS;  Surgeon: Coleen Mensah MD;  Location: [address] [place] [address] OR;  Service: Plastics   • WA RECMPL WND SCALP,EXTR 2 6-7 5 CM N/A 7/1/2021    Procedure: CLOSURE WOUND SCALP X 2 AND RIGHT INNER GROIN;  Surgeon: Coleen Mensah MD;  Location: [address] [place] [address] OR;  Service: Plastics   • TONSILLECTOMY     • WISDOM TOOTH EXTRACTION         Current Outpatient Medications   Medication Sig Dispense Refill   • Aimovig 140 MG/ML SOAJ Inject 140mg under the skin every 30 (thirty) days  1 mL 10   • ALPRAZolam ER (XANAX XR) 0 5 MG 24 hr tablet Take 1 tablet (0 5 mg total) by mouth every morning 30 tablet 2   • amoxicillin-clavulanate (AUGMENTIN) 875-125 mg per tablet Take 1 tablet by mouth every 12 (twelve) hours for 10 days 20 tablet 0   • B-D 3CC LUER-DEANN SYR 25GX1" 25G X 1" 3 ML MISC       • buPROPion (WELLBUTRIN XL) 300 mg 24 hr tablet Take 1 tablet (300 mg total) by mouth daily 90 tablet 0   • celecoxib (CeleBREX) 100 mg capsule Take 1 capsule (100 mg total) by mouth 2 (two) times a day Prn migraine  Hold toradol, indocin  20 capsule 0   • cyclobenzaprine (FLEXERIL) 5 mg tablet Take 1 tablet (5 mg total) by mouth 3 (three) times a day as needed for muscle spasms 60 tablet 0   • dicyclomine (BENTYL) 20 mg tablet Take 1 tablet (20 mg total) by mouth 2 (two) times a day (Patient not taking: Reported on 10/9/2022) 60 tablet 1   • dihydroergotamine (MIGRANAL) 4 MG/ML nasal spray 1 spray into each nostril as needed for migraine Use in one nostril as directed  No more than 4 sprays in one hour 16 mL 0   • diphenoxylate-atropine (LOMOTIL) 2 5-0 025 mg per tablet 1 tablet 4 times daily as needed for diarrhea (Patient not taking: Reported on 10/9/2022) 30 tablet 1   • escitalopram (LEXAPRO) 20 mg tablet Take 1 tablet (20 mg total) by mouth daily 30 tablet 2   • etonogestrel-ethinyl estradiol (NuvaRing) 0 12-0 015 MG/24HR vaginal ring Insert ring for 21 days then remove for one week  3 each 0   • gabapentin (Neurontin) 100 mg capsule Take 1 capsule (100 mg total) by mouth 3 (three) times a day 30 capsule 0   • indomethacin (INDOCIN) 25 mg capsule 1 tab BID prn severe headache with food  Hold toradol   30 capsule 0   • ketorolac (TORADOL) 10 mg tablet Take 1 tablet (10 mg total) by mouth every 6 (six) hours as needed (migraine) Max 2-3 per week  10 tablet 0   • magnesium Oxide (MAG-OX) 400 mg TABS Take 1 tablet (400 mg total) by mouth daily 30 tablet 0   • meclizine (ANTIVERT) 12 5 MG tablet Take 1 tablet (12 5 mg total) by mouth 3 (three) times a day as needed for dizziness (Patient not taking: Reported on 10/9/2022) 30 tablet 0   • metFORMIN (GLUCOPHAGE-XR) 500 mg 24 hr tablet Take 2 tablets (1,000 mg total) by mouth daily with breakfast (Patient not taking: Reported on 10/9/2022) 60 tablet 2   • OLANZapine (ZyPREXA) 2 5 mg tablet Take 1 tablet (2 5 mg total) by mouth daily Do not take with reglan  30 tablet 2   • olmesartan (BENICAR) 20 mg tablet Take 1 tablet (20 mg total) by mouth daily 90 tablet 0   • onabotulinumtoxin A (BOTOX) 100 units Inject as directed     • ondansetron (ZOFRAN) 4 mg tablet Take 1 tablet (4 mg total) by mouth every 6 (six) hours 30 tablet 2   • pantoprazole (PROTONIX) 40 mg tablet Take 1 tablet (40 mg total) by mouth daily 30 tablet 3   • prazosin (MINIPRESS) 1 mg capsule Take 1 capsule (1 mg total) by mouth daily at bedtime 30 capsule 2   • predniSONE 10 mg tablet Take 4 tabs x 3 days, then 3 tabs x 3 days, then 2 tabs x 3 days, then 1 tab x 3 days, then 0 5 tab x 3 days, then stop (Patient not taking: Reported on 10/9/2022) 32 tablet 0   • Riboflavin 400 MG CAPS Take 1 capsule (400 mg total) by mouth in the morning 30 capsule 0   • Syringe/Needle, Disp, (SYRINGE 3CC/14QG2-7/4") 27G X 1-1/4" 3 ML MISC Use for IM injection of ketorolac  (Patient not taking: Reported on 10/9/2022) 4 each 0   • verapamil (CALAN) 40 mg tablet 1 tab qhs x 1 week, then BID if tolerated   60 tablet 2   • Xeljanz XR 11 MG TB24  (Patient not taking: Reported on 10/9/2022)       Current Facility-Administered Medications   Medication Dose Route Frequency Provider Last Rate Last Admin   • cyanocobalamin injection 1,000 mcg  1,000 mcg Intramuscular Q30 Days Morena Arroyo DO   1,000 mcg at 08/03/20 1344   • cyanocobalamin injection 1,000 mcg  1,000 mcg Intramuscular Q14 Days Julieth Cassis, DO   1,000 mcg at 05/18/20 1146   • cyanocobalamin injection 1,000 mcg  1,000 mcg Intramuscular Q30 Days Julieth Cassis, DO   1,000 mcg at 09/01/20 1129        Allergies   Allergen Reactions   • Cimzia [Certolizumab Pegol] Swelling   • Desvenlafaxine      Other reaction(s): state of confusion   • Ixekizumab Vomiting   • Valproic Acid Other (See Comments)     Other reaction(s): dilated pupils, "schizi"   • Tizanidine Anxiety       Review of Systems    Video Exam    There were no vitals filed for this visit      Physical Exam     Visit Time    Visit Start Time: 8:01am  Visit Stop Time: 8:53 AM  Total Visit Duration: 52 minutes

## 2022-10-25 ENCOUNTER — TELEPHONE (OUTPATIENT)
Dept: NEUROLOGY | Facility: CLINIC | Age: 32
End: 2022-10-25

## 2022-10-25 NOTE — TELEPHONE ENCOUNTER
Spoke with Ranjan Gasca yesterday and we got her an appt this Friday 10/28/22 in [facility] for her Botox appt!

## 2022-10-25 NOTE — TELEPHONE ENCOUNTER
Regarding: migraine   ----- Message from Lyric Go PA-C sent at 10/24/2022  5:28 PM EDT -----       ----- Message sent from Lyric Go PA-C to Sumanth Mixonshelly at 10/24/2022  5:27 PM -----   Rory Sow,  Sorry that I had to cancel your botox tomorrow  I can get you in earlier depending on your location preference/ availability  Are the migraines better managed or still daily?      ----- Message -----       From:Cruz Schneider       Sent:9/30/2022  1:25 PM EDT         To:Jacki Perez    Subject:migraine     I know you are sening prescriptions to the pharmacy  I am at a 9 pain skill  I'm going to [facility]

## 2022-10-28 ENCOUNTER — PROCEDURE VISIT (OUTPATIENT)
Dept: NEUROLOGY | Facility: CLINIC | Age: 32
End: 2022-10-28

## 2022-10-28 ENCOUNTER — TELEMEDICINE (OUTPATIENT)
Dept: BEHAVIORAL/MENTAL HEALTH CLINIC | Facility: CLINIC | Age: 32
End: 2022-10-28

## 2022-10-28 ENCOUNTER — TELEPHONE (OUTPATIENT)
Dept: NEUROLOGY | Facility: CLINIC | Age: 32
End: 2022-10-28

## 2022-10-28 VITALS — SYSTOLIC BLOOD PRESSURE: 132 MMHG | DIASTOLIC BLOOD PRESSURE: 82 MMHG | TEMPERATURE: 97.4 F | HEART RATE: 96 BPM

## 2022-10-28 DIAGNOSIS — F41.1 GENERALIZED ANXIETY DISORDER: Chronic | ICD-10-CM

## 2022-10-28 DIAGNOSIS — G43.009 MIGRAINE WITHOUT AURA AND WITHOUT STATUS MIGRAINOSUS, NOT INTRACTABLE: ICD-10-CM

## 2022-10-28 DIAGNOSIS — G43.709 CHRONIC MIGRAINE WITHOUT AURA WITHOUT STATUS MIGRAINOSUS, NOT INTRACTABLE: Primary | ICD-10-CM

## 2022-10-28 DIAGNOSIS — F33.1 DEPRESSION, MAJOR, RECURRENT, MODERATE (HCC): Primary | Chronic | ICD-10-CM

## 2022-10-28 DIAGNOSIS — G43.711 INTRACTABLE CHRONIC MIGRAINE WITHOUT AURA AND WITH STATUS MIGRAINOSUS: ICD-10-CM

## 2022-10-28 RX ORDER — LANOLIN ALCOHOL/MO/W.PET/CERES
400 CREAM (GRAM) TOPICAL DAILY
Qty: 30 TABLET | Refills: 0 | Status: SHIPPED | OUTPATIENT
Start: 2022-10-28 | End: 2022-11-27

## 2022-10-28 RX ORDER — SODIUM CHLORIDE 9 MG/ML
20 INJECTION, SOLUTION INTRAVENOUS ONCE
OUTPATIENT
Start: 2022-11-21

## 2022-10-28 NOTE — PSYCH
Virtual Regular Visit    Verification of patient location:    Patient is located in the following state in which I hold an active license [place]    Assessment/Plan:    Problem List Items Addressed This Visit        Other    Generalized anxiety disorder (Chronic)    Depression, major, recurrent, moderate (HCC) - Primary (Chronic)        Goals addressed in session: Goal 1      Reason for visit is No chief complaint on file  Encounter provider APARNA Sousa    Provider located at [address] [place] [id number] [phone]    Recent Visits  No visits were found meeting these conditions  Showing recent visits within past 7 days and meeting all other requirements  Future Appointments  No visits were found meeting these conditions  Showing future appointments within next 150 days and meeting all other requirements     The patient was identified by name and date of birth  Wadehermann BriceChristi was informed that this is a telemedicine visit and that the visit is being conducted throughthe [employer]  She agrees to proceed     My office door was closed  No one else was in the room  She acknowledged consent and understanding of privacy and security of the video platform  The patient has agreed to participate and understands they can discontinue the visit at any time  Patient is aware this is a billable service  Lauren Cervantes is a 33 y o  female  DATA: Met with Stephanie for scheduled individual session  Topics of discussion included relationships with family, trauma history, physical health concerns, relationships with friends, mood regulation and symptoms and grief and loss  "My depression is kicking in " Alvin Marquis discussed her physical health concerns and the impact on her overall mood   She states that she has been trying to exercise, but she was only able to do about 10 minutes of exercise  She reports that she feels "heavy" emotionally  She has difficulty identifying any specific thoughts that are accompanying these feelings  This clinician validated her current experience and encouraged her to continue to practice some basic mindfulness skills  Bridger Rizo states that she had a couple of days of feeling good  We discussed the activities that she participated in on those days  She spent some time with her best friend  She did speak with her best friend about some of the communication issues they have been having  This clinician continued to encourage her to advocate for herself  Bridger Rizo discussed her feelings of sadness-- when she sees others who are getting  and doing more in their lives  She states that she has "met" someone on FB Dating  Bridger Rizo discussed her ongoing medical concerns and her switch to [facility]  We discussed the pros/cons of seeking care outside of the Network  This clinician provided an option for a provider, if she is interested in staying within the Network  Bridger Rizo states that her grandmother was hospitalized for a UTI and pneumonia  She is back home now  Her mother is allowing her to visit with her grandmother (masked)  Client shows evidence of utilizing some mindfulness-based skills to manage mental health symptoms  During this session, this clinician used the following therapeutic modalities: supportive psychotherapy, client-centered therapy, mindfulness-based strategies, DBT-informed skills, Motivational Interviewing and solution-focused therapy  ASSESSMENT: Bridger Rizo presents with a somewhat dysthymic mood  Her affect is normal range and intensity, appropriate  Bridger Rizo exhibits good therapeutic rapport with this clinician  Bridger Rizo continues to exhibit willingness to work on treatment goals and objectives  Bridger Rizo presents with a minimal risk of suicide, minimal risk of self-harm, and minimal risk of harm to others       PLAN: Bridger Rizo will return in approximately two weeks for the next scheduled session  Between sessions, Shiraz Alejo will continue to use mindfulness-based strategies to manage her moods and will report back during the next session re: successes and barriers  At the next session, this clinician will use supportive psychotherapy, client-centered therapy, mindfulness-based strategies, DBT-informed skills, Motivational Interviewing and solution-focused therapy to address her mood regulation and relationship concerns, in an effort to assist Shiraz Alejo with meeting treatment goals  HPI     Past Medical History:   Diagnosis Date   • Abnormal Pap smear of cervix    • Allergic    • Anxiety    • Arthritis    • Depression    • Diabetes mellitus ([facility] [address] )    • Fibromyalgia, primary    • Hypertension    • IBS (irritable bowel syndrome)    • Migraine    • Obesity    • Vitamin B12 deficiency        Past Surgical History:   Procedure Laterality Date   • COLONOSCOPY N/A 5/8/2018    Procedure: COLONOSCOPY;  Surgeon: Kofi Kyle MD;  Location: [facility] GI LAB; Service: Gastroenterology   • IN EXC SKIN BENIG >4 CM REMAINDR BODY N/A 7/1/2021    Procedure: EXCISION OF PILAR SCALP CYST X 2 AND RIGHT INNER GROIN NEVVUS;  Surgeon: Angela Dunn MD;  Location: [address] [place] [address];  Service: Plastics   • IN RECMPL WND SCALP,EXTR 2 6-7 5 CM N/A 7/1/2021    Procedure: CLOSURE WOUND SCALP X 2 AND RIGHT INNER GROIN;  Surgeon: Angela Dunn MD;  Location: [address] [place] [address];  Service: Plastics   • TONSILLECTOMY     • WISDOM TOOTH EXTRACTION         Current Outpatient Medications   Medication Sig Dispense Refill   • Aimovig 140 MG/ML SOAJ Inject 140mg under the skin every 30 (thirty) days   1 mL 10   • ALPRAZolam ER (XANAX XR) 0 5 MG 24 hr tablet Take 1 tablet (0 5 mg total) by mouth every morning 30 tablet 2   • B-D 3CC LUER-DEANN SYR 25GX1" 25G X 1" 3 ML MISC       • buPROPion (WELLBUTRIN XL) 300 mg 24 hr tablet Take 1 tablet (300 mg total) by mouth daily 90 tablet 0   • celecoxib (CeleBREX) 100 mg capsule Take 1 capsule (100 mg total) by mouth 2 (two) times a day Prn migraine  Hold toradol, indocin  20 capsule 0   • cyclobenzaprine (FLEXERIL) 5 mg tablet Take 1 tablet (5 mg total) by mouth 3 (three) times a day as needed for muscle spasms 60 tablet 0   • dicyclomine (BENTYL) 20 mg tablet Take 1 tablet (20 mg total) by mouth 2 (two) times a day (Patient not taking: Reported on 10/9/2022) 60 tablet 1   • dihydroergotamine (MIGRANAL) 4 MG/ML nasal spray 1 spray into each nostril as needed for migraine Use in one nostril as directed  No more than 4 sprays in one hour 16 mL 0   • diphenoxylate-atropine (LOMOTIL) 2 5-0 025 mg per tablet 1 tablet 4 times daily as needed for diarrhea (Patient not taking: Reported on 10/9/2022) 30 tablet 1   • escitalopram (LEXAPRO) 20 mg tablet Take 1 tablet (20 mg total) by mouth daily 30 tablet 2   • etonogestrel-ethinyl estradiol (NuvaRing) 0 12-0 015 MG/24HR vaginal ring Insert ring for 21 days then remove for one week  3 each 0   • gabapentin (Neurontin) 100 mg capsule Take 1 capsule (100 mg total) by mouth 3 (three) times a day 30 capsule 0   • indomethacin (INDOCIN) 25 mg capsule 1 tab BID prn severe headache with food  Hold toradol  30 capsule 0   • ketorolac (TORADOL) 10 mg tablet Take 1 tablet (10 mg total) by mouth every 6 (six) hours as needed (migraine) Max 2-3 per week  10 tablet 0   • magnesium Oxide (MAG-OX) 400 mg TABS Take 1 tablet (400 mg total) by mouth daily 30 tablet 0   • meclizine (ANTIVERT) 12 5 MG tablet Take 1 tablet (12 5 mg total) by mouth 3 (three) times a day as needed for dizziness (Patient not taking: Reported on 10/9/2022) 30 tablet 0   • metFORMIN (GLUCOPHAGE-XR) 500 mg 24 hr tablet Take 2 tablets (1,000 mg total) by mouth daily with breakfast (Patient not taking: Reported on 10/9/2022) 60 tablet 2   • OLANZapine (ZyPREXA) 2 5 mg tablet Take 1 tablet (2 5 mg total) by mouth daily Do not take with reglan   30 tablet 2   • olmesartan (BENICAR) 20 mg tablet Take 1 tablet (20 mg total) by mouth daily 90 tablet 0   • onabotulinumtoxin A (BOTOX) 100 units Inject as directed     • ondansetron (ZOFRAN) 4 mg tablet Take 1 tablet (4 mg total) by mouth every 6 (six) hours 30 tablet 2   • pantoprazole (PROTONIX) 40 mg tablet Take 1 tablet (40 mg total) by mouth daily 30 tablet 3   • prazosin (MINIPRESS) 1 mg capsule Take 1 capsule (1 mg total) by mouth daily at bedtime 30 capsule 2   • predniSONE 10 mg tablet Take 4 tabs x 3 days, then 3 tabs x 3 days, then 2 tabs x 3 days, then 1 tab x 3 days, then 0 5 tab x 3 days, then stop (Patient not taking: Reported on 10/9/2022) 32 tablet 0   • Riboflavin 400 MG CAPS Take 1 capsule (400 mg total) by mouth in the morning 30 capsule 0   • Syringe/Needle, Disp, (SYRINGE 3CC/37PK8-6/4") 27G X 1-1/4" 3 ML MISC Use for IM injection of ketorolac  (Patient not taking: Reported on 10/9/2022) 4 each 0   • verapamil (CALAN) 40 mg tablet 1 tab qhs x 1 week, then BID if tolerated  60 tablet 2   • Xeljanz XR 11 MG TB24  (Patient not taking: Reported on 10/9/2022)       Current Facility-Administered Medications   Medication Dose Route Frequency Provider Last Rate Last Admin   • cyanocobalamin injection 1,000 mcg  1,000 mcg Intramuscular Q30 Days Johnnette Shorts, DO   1,000 mcg at 08/03/20 5568   • cyanocobalamin injection 1,000 mcg  1,000 mcg Intramuscular Q14 Days Johnnette Shorts, DO   1,000 mcg at 05/18/20 1146   • cyanocobalamin injection 1,000 mcg  1,000 mcg Intramuscular Q30 Days Johnnette Shorts, DO   1,000 mcg at 09/01/20 1129        Allergies   Allergen Reactions   • Cimzia [Certolizumab Pegol] Swelling   • Desvenlafaxine      Other reaction(s): state of confusion   • Ixekizumab Vomiting   • Valproic Acid Other (See Comments)     Other reaction(s): dilated pupils, "schizi"   • Tizanidine Anxiety       Review of Systems    Video Exam    There were no vitals filed for this visit      Physical Exam     Visit Time    Visit Start Time: 8:01am  Visit Stop Time: 8:51  Total Visit Duration: 50 minutes

## 2022-10-28 NOTE — TELEPHONE ENCOUNTER
Patient called to advise she is stuck in traffic and will be 5-10 minutes late for her 10am appointment with Manisha Cates  Advised as long as she isn't more than 15 minutes late they will still be able to see her  Called the  to let them know

## 2022-10-28 NOTE — PROGRESS NOTES
Universal Protocol   Consent: Verbal consent obtained  Written consent obtained    Risks and benefits: risks, benefits and alternatives were discussed  Consent given by: patient  Patient understanding: patient states understanding of the procedure being performed  Patient consent: the patient's understanding of the procedure matches consent given  Procedure consent: procedure consent matches procedure scheduled        Chemodenervation     Date/Time 10/28/2022 10:49 AM     Performed by  Guillermo Schmidt PA-C     Authorized by Guillermo Schmidt PA-C        Pre-procedure details      Prepped With: Alcohol     Procedure details     Position:  Upright   Botox     Botox Type:  Type A    Brand:  Botox    mL's of Botulinum Toxin:  195    Final Concentration per CC:  100 units    Needle Gauge:  30 G 2 5 inch   Procedures     Botox Procedures: chronic headache      Indications: migraines     Injection Location      Head / Face:  L superior trapezius, R superior trapezius, L superior cervical paraspinal, R superior cervical paraspinal, L , R , procerus, L temporalis, R temporalis, R frontalis, L frontalis, R medial occipitalis and L medial occipitalis    L  injection amount:  5 unit(s)    R  injection amount:  5 unit(s)    L lateral frontalis:  5 unit(s)    R lateral frontalis:  5 unit(s)    L medial frontalis:  5 unit(s)    R medial frontalis:  5 unit(s)    L temporalis injection amount:  20 unit(s)    R temporalis injection amount:  20 unit(s)    Procerus injection amount:  5 unit(s)    L medial occipitalis injection amount:  15 unit(s)    R medial occipitalis injection amount:  15 unit(s)    L superior cervical paraspinal injection amount:  10 unit(s)    R superior cervical paraspinal injection amount:  10 unit(s)    L superior trapezius injection amount:  0 unit(s)    R superior trapezius injection amount:  0 unit(s)   Total Units     Total units used:  195    Total units discarded:  5 Post-procedure details      Chemodenervation:  Chronic migraine    Facial Nerve Location[de-identified]  Bilateral facial nerve    Patient tolerance of procedure: Tolerated well, no immediate complications   Comments      Medically necessary:  - 10 R masseter  - 10 L masseter  - 30 units between the R and L headband region/ scalp  - 20 units between each anterior temporalis  Avoided traps b/l  Blood pressure 132/82, pulse 96, temperature (!) 97 4 °F (36 3 °C), temperature source Temporal, not currently breastfeeding  The patient reports her TMJ is acting up, increased tremor sxs and ongoing migraine headaches  She does like indocin and states 25 mg is enough to lessen the migraine  She gets about 3 migraines per week  Will continue botox, and add Vyepti at this time for migraine prevention  S/e reviewed  Pt will hold aimovig  She wants to hold verapamil since on BP med now  States she did not start yet because her other provider today her to hold off due to being on another BP med  I explained to the pt that verapamil low dose helps with migraine prevention and slightly with increased ICP  Regardless, since starting vyepti will hold verapamil for now  Pt states her tremor is ongoing and possibly slightly worse since last seen  She admits only 2 bottles water daily, and wants to try to drink more  No family history tremor that she knows of  She cannot correlate tremor with any medication; not worse with steroid use in the recent past     She still gets dizziness, some confusion, staring, etc , with the migraines

## 2022-11-02 ENCOUNTER — TELEPHONE (OUTPATIENT)
Dept: NEUROLOGY | Facility: CLINIC | Age: 32
End: 2022-11-02

## 2022-11-02 NOTE — TELEPHONE ENCOUNTER
Fax received from pharmacy requesting PA for aimovig       ID: [id number]  GROUP: [id number]  PCN: [id number]  BIN: [id number]    Key: [id number]

## 2022-11-08 ENCOUNTER — ANNUAL EXAM (OUTPATIENT)
Dept: OBGYN CLINIC | Facility: MEDICAL CENTER | Age: 32
End: 2022-11-08

## 2022-11-08 VITALS
BODY MASS INDEX: 44.74 KG/M2 | WEIGHT: 252.5 LBS | DIASTOLIC BLOOD PRESSURE: 78 MMHG | HEIGHT: 63 IN | SYSTOLIC BLOOD PRESSURE: 138 MMHG

## 2022-11-08 DIAGNOSIS — F41.0 PANIC DISORDER WITHOUT AGORAPHOBIA: ICD-10-CM

## 2022-11-08 DIAGNOSIS — B97.7 HIGH RISK HPV INFECTION: Primary | ICD-10-CM

## 2022-11-08 DIAGNOSIS — Z30.44 ENCOUNTER FOR SURVEILLANCE OF VAGINAL RING HORMONAL CONTRACEPTIVE DEVICE: ICD-10-CM

## 2022-11-08 DIAGNOSIS — Z01.419 ENCOUNTER FOR ROUTINE GYNECOLOGICAL EXAMINATION WITH PAPANICOLAOU SMEAR OF CERVIX: ICD-10-CM

## 2022-11-08 RX ORDER — ETONOGESTREL AND ETHINYL ESTRADIOL 11.7; 2.7 MG/1; MG/1
INSERT, EXTENDED RELEASE VAGINAL
Qty: 3 EACH | Refills: 4 | Status: SHIPPED | OUTPATIENT
Start: 2022-11-08 | End: 2023-11-12

## 2022-11-08 RX ORDER — ALPRAZOLAM 1 MG/1
1 TABLET, EXTENDED RELEASE ORAL EVERY MORNING
Qty: 30 TABLET | Refills: 0 | OUTPATIENT
Start: 2022-11-08

## 2022-11-08 RX ORDER — ALPRAZOLAM 1 MG/1
1 TABLET, EXTENDED RELEASE ORAL EVERY MORNING
Qty: 30 TABLET | Refills: 0 | Status: SHIPPED | OUTPATIENT
Start: 2022-11-08 | End: 2023-01-04 | Stop reason: SDUPTHER

## 2022-11-08 NOTE — TELEPHONE ENCOUNTER
Spoke with Alvin Marquis  She is requesting to increase the Alprazolam from 0 5 mg to the 1 mg  She doesn't feel the lower dose does anything       Please review

## 2022-11-08 NOTE — TELEPHONE ENCOUNTER
Nirav Martinez and/or Patient requested a call back to discuss increase on med dosage from 0 5 mg to 1 mg  (Alprazolam)    They can be reached at P# [phone]  Thank you

## 2022-11-08 NOTE — PROGRESS NOTES
ASSESSMENT & PLAN: Sheila Garza is a 33 y o  [place] with normal gynecologic exam     1   Routine well woman exam done today  2  Pap and HPV:  The patient's last pap and hpv was 2021  It was abnormal     Pap and cotesting was done today  Current ASCCP Guidelines reviewed  - aware if abnormal will need colposcopy  3  The following were reviewed in today's visit: breast self exam, family planning choices, exercise and healthy diet  4  Hx if well controlled htn on meds , migraines with no aura- states nuvaring only birth control she tolerates well  - we discussed risks of medication / states she is well aware and feels risk of pregnancy on her body is higher than risk of nuvaring use      CC:  Annual Gynecologic Examination    HPI: Sheila Garza is a 33 y o  [place] who presents for annual gynecologic examination  She has the following concerns:  Needs refill on nuvaring / only birth control she can tolerate well     Health Maintenance:    She wears her seatbelt routinely  She does perform regular monthly self breast exams  She feels safe at home  Past Medical History:   Diagnosis Date   • Abnormal Pap smear of cervix    • Allergic    • Anxiety    • Arthritis    • Depression    • Diabetes mellitus (HCC)    • Fibromyalgia, primary    • Hypertension    • IBS (irritable bowel syndrome)    • Migraine    • Obesity    • Vitamin B12 deficiency        Past Surgical History:   Procedure Laterality Date   • COLONOSCOPY N/A 5/8/2018    Procedure: COLONOSCOPY;  Surgeon: Aracelis Bah MD;  Location: [facility] GI LAB;   Service: Gastroenterology   • AK EXC SKIN BENIG >4 CM REMAINDR BODY N/A 7/1/2021    Procedure: EXCISION OF PILAR SCALP CYST X 2 AND RIGHT INNER GROIN NEVVUS;  Surgeon: Mir Gomez MD;  Location: [address] [place] [address] OR;  Service: Plastics   • AK RECMPL WND SCALP,EXTR 2 6-7 5 CM N/A 7/1/2021    Procedure: CLOSURE WOUND SCALP X 2 AND RIGHT INNER GROIN;  Surgeon: Mir Gomez MD;  Location: [address] [place] [address] OR;  Service: Plastics   • TONSILLECTOMY     • WISDOM TOOTH EXTRACTION         Past OB/Gyn History:  OB History        0    Para   0    Term   0       0    AB   0    Living   0       SAB   0    IAB   0    Ectopic   0    Multiple   0    Live Births   0               Pt does not have menstrual issues  History of sexually transmitted infection: No   History of abnormal pap smears: Yes   Family History   Problem Relation Age of Onset   • Rheum arthritis Mother    • Psoriasis Mother    • Other Mother    • Hypertension Mother    • Diabetes unspecified Mother    • Sjogren's syndrome Mother    • Alcohol abuse Mother    • Drug abuse Mother    • Anxiety disorder Father    • Alcohol abuse Father    • Lung cancer Maternal Grandfather    • Cancer Other         bone   • Diabetes Other    • Other Other         High blood pressure   • Depression Maternal Grandmother        Social History:  Social History     Socioeconomic History   • Marital status: Single     Spouse name: Not on file   • Number of children: 0   • Years of education: 12   • Highest education level: Not on file   Occupational History   • Occupation: Unemployed     Employer: [employer]   Tobacco Use   • Smoking status: Never Smoker   • Smokeless tobacco: Never Used   Vaping Use   • Vaping Use: Some days   • Start date: 2019   • Substances: THC, CBD   Substance and Sexual Activity   • Alcohol use: Yes     Comment: rarely   • Drug use: Yes     Frequency: 2 0 times per week     Types: Marijuana     Comment: medical marijuana (vape)   • Sexual activity: Not Currently     Partners: Male     Comment: Nuva ring   Other Topics Concern   • Not on file   Social History Narrative    Home:  Living with mom and MGM        Education:    Pt denies any h/o learning disability Dxs but admits to having great difficulty in math requiring a   She reached childhood milestones on time as far as he knows      Graduated HS     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 college art classes--she stopped due to anxiety from dissatisfaction with her classes--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 felt they were telling her what to create  On reflection, she feels it was moreso her anxiety as the cause of her leaving school, and she was using the other reason as an excuse so she would not have to admit to anxiety at that time  She is now very open about her anxiety and does not mind that I have this information in the general social section of her chart  Social Determinants of Health     Financial Resource Strain: Low Risk    • Difficulty of Paying Living Expenses: Not hard at all   Food Insecurity: No Food Insecurity   • Worried About Running Out of Food in the Last Year: Never true   • Ran Out of Food in the Last Year: Never true   Transportation Needs: No Transportation Needs   • Lack of Transportation (Medical): No   • Lack of Transportation (Non-Medical): No   Physical Activity: Not on file   Stress: Not on file   Social Connections: Not on file   Intimate Partner Violence: Not on file   Housing Stability: Not on file         Allergies   Allergen Reactions   • Cimzia [Certolizumab Pegol] Swelling   • Desvenlafaxine      Other reaction(s): state of confusion   • Ixekizumab Vomiting   • Valproic Acid Other (See Comments)     Other reaction(s): dilated pupils, "schizi"   • Tizanidine Anxiety         Current Outpatient Medications:   •  Aimovig 140 MG/ML SOAJ, Inject 140mg under the skin every 30 (thirty) days  , Disp: 1 mL, Rfl: 10  •  ALPRAZolam ER (XANAX XR) 1 MG 24 hr tablet, Take 1 tablet (1 mg total) by mouth every morning, Disp: 30 tablet, Rfl: 0  •  buPROPion (WELLBUTRIN XL) 300 mg 24 hr tablet, Take 1 tablet (300 mg total) by mouth daily, Disp: 90 tablet, Rfl: 0  •  celecoxib (CeleBREX) 100 mg capsule, Take 1 capsule (100 mg total) by mouth 2 (two) times a day Prn migraine   Hold toradol, indocin , Disp: 20 capsule, Rfl: 0  • dihydroergotamine (MIGRANAL) 4 MG/ML nasal spray, 1 spray into each nostril as needed for migraine Use in one nostril as directed  No more than 4 sprays in one hour, Disp: 16 mL, Rfl: 0  •  escitalopram (LEXAPRO) 20 mg tablet, Take 1 tablet (20 mg total) by mouth daily, Disp: 30 tablet, Rfl: 2  •  etonogestrel-ethinyl estradiol (NuvaRing) 0 12-0 015 MG/24HR vaginal ring, Insert ring for 21 days then remove for one week , Disp: 3 each, Rfl: 4  •  indomethacin (INDOCIN) 25 mg capsule, 1 tab BID prn severe headache with food   Hold toradol , Disp: 30 capsule, Rfl: 0  •  ketorolac (TORADOL) 10 mg tablet, Take 1 tablet (10 mg total) by mouth every 6 (six) hours as needed (migraine) Max 2-3 per week , Disp: 10 tablet, Rfl: 0  •  magnesium Oxide (MAG-OX) 400 mg TABS, Take 1 tablet (400 mg total) by mouth daily, Disp: 30 tablet, Rfl: 0  •  meclizine (ANTIVERT) 12 5 MG tablet, Take 1 tablet (12 5 mg total) by mouth 3 (three) times a day as needed for dizziness, Disp: 30 tablet, Rfl: 0  •  OLANZapine (ZyPREXA) 2 5 mg tablet, Take 1 tablet (2 5 mg total) by mouth daily Do not take with reglan , Disp: 30 tablet, Rfl: 2  •  onabotulinumtoxin A (BOTOX) 100 units, Inject as directed, Disp: , Rfl:   •  ondansetron (ZOFRAN) 4 mg tablet, Take 1 tablet (4 mg total) by mouth every 6 (six) hours, Disp: 30 tablet, Rfl: 2  •  pantoprazole (PROTONIX) 40 mg tablet, Take 1 tablet (40 mg total) by mouth daily, Disp: 30 tablet, Rfl: 3  •  prazosin (MINIPRESS) 1 mg capsule, Take 1 capsule (1 mg total) by mouth daily at bedtime, Disp: 30 capsule, Rfl: 2  •  B-D 3CC LUER-DEANN SYR 25GX1" 25G X 1" 3 ML MISC,   (Patient not taking: Reported on 11/8/2022), Disp: , Rfl:   •  cyclobenzaprine (FLEXERIL) 5 mg tablet, Take 1 tablet (5 mg total) by mouth 3 (three) times a day as needed for muscle spasms, Disp: 60 tablet, Rfl: 0  •  dicyclomine (BENTYL) 20 mg tablet, Take 1 tablet (20 mg total) by mouth 2 (two) times a day (Patient not taking: No sig reported), Disp: 60 tablet, Rfl: 1  •  diphenoxylate-atropine (LOMOTIL) 2 5-0 025 mg per tablet, 1 tablet 4 times daily as needed for diarrhea (Patient not taking: No sig reported), Disp: 30 tablet, Rfl: 1  •  gabapentin (Neurontin) 100 mg capsule, Take 1 capsule (100 mg total) by mouth 3 (three) times a day (Patient not taking: Reported on 11/8/2022), Disp: 30 capsule, Rfl: 0  •  metFORMIN (GLUCOPHAGE-XR) 500 mg 24 hr tablet, Take 2 tablets (1,000 mg total) by mouth daily with breakfast (Patient not taking: No sig reported), Disp: 60 tablet, Rfl: 2  •  olmesartan (BENICAR) 20 mg tablet, Take 1 tablet (20 mg total) by mouth daily (Patient not taking: Reported on 11/8/2022), Disp: 90 tablet, Rfl: 0  •  Riboflavin 400 MG CAPS, Take 1 capsule (400 mg total) by mouth in the morning, Disp: 30 capsule, Rfl: 0  •  Syringe/Needle, Disp, (SYRINGE 3CC/48HJ6-9/4") 27G X 1-1/4" 3 ML MISC, Use for IM injection of ketorolac  (Patient not taking: No sig reported), Disp: 4 each, Rfl: 0    Current Facility-Administered Medications:   •  cyanocobalamin injection 1,000 mcg, 1,000 mcg, Intramuscular, Q30 Days, Waynard Shark, DO, 1,000 mcg at 08/03/20 8812  •  cyanocobalamin injection 1,000 mcg, 1,000 mcg, Intramuscular, Q14 Days, Waynard Shark, DO, 1,000 mcg at 05/18/20 1146  •  cyanocobalamin injection 1,000 mcg, 1,000 mcg, Intramuscular, Q30 Days, Waynard Shark, DO, 1,000 mcg at 09/01/20 1129      Review of Systems  Constitutional :no fever, feels well, no tiredness, no recent weight gain or loss  ENT: no ear ache, no loss of hearing, no nosebleeds or nasal discharge, no sore throat or hoarseness  Cardiovascular: no complaints of slow or fast heart beat, no chest pain, no palpitations, no leg claudication or lower extremity edema    Respiratory: no complaints of shortness of shortness of breath, no MAURO  Breasts:no complaints of breast pain, breast lump, or nipple discharge  Gastrointestinal: no complaints of abdominal pain, constipation, nausea, vomiting, or diarrhea or bloody stools  Genitourinary : no complaints of dysuria, incontinence, pelvic pain, no dysmenorrhea, vaginal discharge or abnormal vaginal bleeding and as noted in HPI  Musculoskeletal: no complaints of arthralgia, no myalgia, no joint swelling or stiffness, no limb pain or swelling  Integumentary: no complaints of skin rash or lesion, itching or dry skin  Neurological: no complaints of headache, no confusion, no numbness or tingling, no dizziness or fainting    Objective      /78   Ht 5' 3" (1 6 m)   Wt 115 kg (252 lb 8 oz)   BMI 44 73 kg/m²   General:   appears stated age, cooperative, alert normal mood and affect   Lungs: Unlabored breathing    Breasts: normal appearance, no masses or tenderness   Abdomen: soft, non-tender, without masses or organomegaly   Vulva: normal   Vagina: normal vagina, no discharge, exudate, lesion, or erythema  nuvaring in place   Urethra: normal   Cervix: Normal, no discharge  Nontender     Uterus: normal size, contour, position, consistency, mobility, non-tender   Adnexa: no mass, fullness, tenderness   Psychiatric orientation to person, place, and time: normal  mood and affect: normal

## 2022-11-08 NOTE — TELEPHONE ENCOUNTER
Initiated and completed PA request for Alprazolam 1 mg ER via [employer] and sent to [facility]  Will call with determination    Chelo please print request for office records

## 2022-11-08 NOTE — TELEPHONE ENCOUNTER
Spoke with Alvin Marquis and reviewed need for prior authorization  She is familiar with the process  Nursing will call her when a decision is received

## 2022-11-08 NOTE — TELEPHONE ENCOUNTER
Medication Refill Request     Name of Medication Alprazolam  Dose/Frequency 1 mg 1 daily  Quantity 30  Verified pharmacy   [x]  Verified ordering Provider   [x]  Does patient have enough for the next 3 days? Yes [x] No []  Does patient have a follow-up appointment scheduled? Yes [x] No []   If so when is appointment: 1/4/2023 at 10:30 a m  Pharmacist called to informed that the refill for this medication needs a pre-authorization from health insurance

## 2022-11-09 ENCOUNTER — TELEPHONE (OUTPATIENT)
Dept: NEUROLOGY | Facility: CLINIC | Age: 32
End: 2022-11-09

## 2022-11-09 NOTE — TELEPHONE ENCOUNTER
Received approval from [facility] for alprazolam ER 1 mg daily, 11/9/22 - 11/9/23  Spoke with pharmacist at [facility]  He was not able to process  Will need follow up

## 2022-11-09 NOTE — TELEPHONE ENCOUNTER
----- Message from Brandon Lamar PA-C sent at 10/28/2022 10:44 AM EDT -----  Regarding: vyepti  Pt would like to switch aimovig to Vyepti  Please schedule if possible  Thanks!

## 2022-11-09 NOTE — TELEPHONE ENCOUNTER
Patient prefers [facility] location  Faxed vyetpi connect form, clinical notes and insurance information to [phone]

## 2022-11-10 NOTE — TELEPHONE ENCOUNTER
Follow up call made and spoke with pharmacist  Per female Ed called insurance and was able to process  No further action needed  Spoke with Alvin Marquis and made aware issue has been resolved

## 2022-11-11 ENCOUNTER — OFFICE VISIT (OUTPATIENT)
Dept: FAMILY MEDICINE CLINIC | Facility: CLINIC | Age: 32
End: 2022-11-11

## 2022-11-11 ENCOUNTER — TELEMEDICINE (OUTPATIENT)
Dept: BEHAVIORAL/MENTAL HEALTH CLINIC | Facility: CLINIC | Age: 32
End: 2022-11-11

## 2022-11-11 VITALS
HEIGHT: 63 IN | OXYGEN SATURATION: 98 % | SYSTOLIC BLOOD PRESSURE: 128 MMHG | HEART RATE: 90 BPM | BODY MASS INDEX: 44.65 KG/M2 | DIASTOLIC BLOOD PRESSURE: 84 MMHG | TEMPERATURE: 97.6 F | WEIGHT: 252 LBS

## 2022-11-11 DIAGNOSIS — F33.1 DEPRESSION, MAJOR, RECURRENT, MODERATE (HCC): Primary | Chronic | ICD-10-CM

## 2022-11-11 DIAGNOSIS — F41.1 GENERALIZED ANXIETY DISORDER: Chronic | ICD-10-CM

## 2022-11-11 DIAGNOSIS — L30.4 INTERTRIGO: ICD-10-CM

## 2022-11-11 DIAGNOSIS — G25.81 RESTLESS LEG SYNDROME: Primary | ICD-10-CM

## 2022-11-11 RX ORDER — NYSTATIN 100000 [USP'U]/G
POWDER TOPICAL 4 TIMES DAILY
Qty: 60 G | Refills: 5 | Status: SHIPPED | OUTPATIENT
Start: 2022-11-11

## 2022-11-11 RX ORDER — PRAMIPEXOLE DIHYDROCHLORIDE 0.25 MG/1
0.25 TABLET ORAL
Qty: 90 TABLET | Refills: 1 | Status: SHIPPED | OUTPATIENT
Start: 2022-11-11

## 2022-11-11 NOTE — PROGRESS NOTES
Assessment/Plan:  Labs records reviewed  Patient will start Mirapex for restless leg syndrome  Patient will use Mycostatin powder as directed for intertrigo  Diagnoses and all orders for this visit:    Restless leg syndrome  -     pramipexole (MIRAPEX) 0 25 mg tablet; Take 1 tablet (0 25 mg total) by mouth daily at bedtime    Intertrigo  -     nystatin (MYCOSTATIN) powder; Apply topically 4 (four) times a day            Subjective:        Patient ID: Pina Canela is a 33 y o  female  Patient is here with leg discomfort bilaterally worse at night  This improves with movement and ambulation  This only occurs at night  No significant new back pain  No significant change urination or defecation  No fevers  No medications use in this regard  The following portions of the patient's history were reviewed and updated as appropriate: allergies, current medications, past family history, past medical history, past social history, past surgical history and problem list       Review of Systems   Constitutional: Negative  HENT: Negative  Eyes: Negative  Respiratory: Negative  Cardiovascular: Negative  Gastrointestinal: Negative  Endocrine: Negative  Genitourinary: Negative  Musculoskeletal: Positive for arthralgias and myalgias  Skin: Negative  Allergic/Immunologic: Negative  Neurological: Negative  Hematological: Negative  Psychiatric/Behavioral: Negative  Objective:               /84 (BP Location: Right arm, Patient Position: Sitting, Cuff Size: Adult)   Pulse 90   Temp 97 6 °F (36 4 °C) (Tympanic)   Ht 5' 3" (1 6 m)   Wt 114 kg (252 lb)   SpO2 98%   BMI 44 64 kg/m²          Physical Exam  Vitals and nursing note reviewed  Constitutional:       Appearance: Normal appearance  Cardiovascular:      Rate and Rhythm: Normal rate and regular rhythm  Pulses: Normal pulses  Heart sounds: Normal heart sounds     Pulmonary: Effort: Pulmonary effort is normal       Breath sounds: Normal breath sounds  Skin:     Findings: Rash present  Neurological:      Mental Status: She is alert

## 2022-11-11 NOTE — PSYCH
Virtual Regular Visit    Verification of patient location:    Patient is located in the following state in which I hold an active license [place]    Assessment/Plan:    Problem List Items Addressed This Visit        Other    Generalized anxiety disorder (Chronic)    Depression, major, recurrent, moderate (HCC) - Primary (Chronic)        Goals addressed in session: Goal 1      Reason for visit is No chief complaint on file  Encounter provider APARNA Lopez    Provider located at [address] [place] [id number] [phone]    Recent Visits  No visits were found meeting these conditions  Showing recent visits within past 7 days and meeting all other requirements  Future Appointments  No visits were found meeting these conditions  Showing future appointments within next 150 days and meeting all other requirements     The patient was identified by name and date of birth  Saloni Chacon was informed that this is a telemedicine visit and that the visit is being conducted thr[facility]  She agrees to proceed     My office door was closed  No one else was in the room  She acknowledged consent and understanding of privacy and security of the video platform  The patient has agreed to participate and understands they can discontinue the visit at any time  Patient is aware this is a billable service  Chaim Single is a 33 y o  female  DATA: Met with Stephanie for scheduled individual session  Topics of discussion included relationships with family, physical health concerns, relationships with friends, mood regulation and symptoms and finding purpose/meaning in life  Alexsandra Given "I'm still sad, but I've been hooping every day " Stephanie discussed her efforts to manage her mood  She acknowledges that her mood is improved when she is getting exercise   We spent some time discussing her grandmother's health concerns  Miguel Story is still spending some time with her during the day, when her mother is unable to be with her  Miguel Story discussed her own medical care  She reports that she had testing from her gynecologist, and she tested positive for HPV again  We discussed this diagnosis and the follow up that she will need to have  We spent some time talking about her mood dysregulation  She states, "I'm trying to love myself " We discussed ways to build her positive view of herself-- with the use of reframing techniques and self-affirmation  She states that her pain level right now is at about a 5 or 6, which is baseline for her  She does state that she is getting outside  She reports that her nutrition is not very good at this point  "I either eat too much or not at all " She states that she is feeling that she is becoming dependent upon her medical MJ  She states that she has been vaping or smoking on a daily basis  We discussed harm reduction and changing from an inhaled product to a non-inhaled product  She states that she plans to speak with the pharmacist at the dispensary regarding this issue  Miguel Story states that she spoke with her neurologist and will be following up with her next week regarding her "shakiness" and her migraines  We spent some time discussing finding purpose in life  Miguel Story discussed the possibility of working in the future  In the short-term, we discussed the possibility of volunteer opportunities  She states that she would be interested in volunteering at the animal shelter  She plans to call and find out what she needs to do to start this process  Client shows evidence of utilizing Mindfulness-based strategies and emotion regulation skills skills to manage mental health symptoms   During this session, this clinician used the following therapeutic modalities: supportive psychotherapy, client-centered therapy, mindfulness-based strategies, DBT-informed skills, Motivational Interviewing and solution-focused therapy  ASSESSMENT: Eduardo La presents with a normal mood  Her affect is normal range and intensity, appropriate  Eduardo La exhibits good therapeutic rapport with this clinician  Eduardo La continues to exhibit willingness to work on treatment goals and objectives  Eduardo La presents with a minimal risk of suicide, minimal risk of self-harm, and minimal risk of harm to others  PLAN: Eduardo La will return in approximately two weeks for the next scheduled session  Between sessions, Eduardo La will reach out to the animal shelter to begin the process of volunteering  She will report back during the next session re: successes and barriers  At the next session, this clinician will use supportive psychotherapy, client-centered therapy, mindfulness-based strategies, DBT-informed skills, Motivational Interviewing and solution-focused therapy to address her mood regulation and relationship concerns, in an effort to assist Eduardo La with meeting treatment goals  HPI     Past Medical History:   Diagnosis Date   • Abnormal Pap smear of cervix    • Allergic    • Anxiety    • Arthritis    • Depression    • Diabetes mellitus ([address] )    • Fibromyalgia, primary    • Hypertension    • IBS (irritable bowel syndrome)    • Migraine    • Obesity    • Vitamin B12 deficiency        Past Surgical History:   Procedure Laterality Date   • COLONOSCOPY N/A 5/8/2018    Procedure: COLONOSCOPY;  Surgeon: Amauri Razo MD;  Location: [facility];   Service: Gastroenterology   • MT EXC SKIN BENIG >4 CM REMAINDR BODY N/A 7/1/2021    Procedure: EXCISION OF PILAR SCALP CYST X 2 AND RIGHT INNER GROIN NEVVUS;  Surgeon: Kaela Khanna MD;  Location: [address] [place] [address] MAIN OR;  Service: Plastics   • MT RECMPL WND SCALP,EXTR 2 6-7 5 CM N/A 7/1/2021    Procedure: CLOSURE WOUND SCALP X 2 AND RIGHT INNER GROIN;  Surgeon: Kaela Khanna MD;  Location:  MAIN OR;  Service: Plastics   • TONSILLECTOMY     • WISDOM TOOTH EXTRACTION         Current Outpatient Medications   Medication Sig Dispense Refill   • Aimovig 140 MG/ML SOAJ Inject 140mg under the skin every 30 (thirty) days  1 mL 10   • ALPRAZolam ER (XANAX XR) 1 MG 24 hr tablet Take 1 tablet (1 mg total) by mouth every morning 30 tablet 0   • B-D 3CC LUER-DEANN SYR 25GX1" 25G X 1" 3 ML MISC   (Patient not taking: Reported on 11/8/2022)     • buPROPion (WELLBUTRIN XL) 300 mg 24 hr tablet Take 1 tablet (300 mg total) by mouth daily 90 tablet 0   • celecoxib (CeleBREX) 100 mg capsule Take 1 capsule (100 mg total) by mouth 2 (two) times a day Prn migraine  Hold toradol, indocin  20 capsule 0   • cyclobenzaprine (FLEXERIL) 5 mg tablet Take 1 tablet (5 mg total) by mouth 3 (three) times a day as needed for muscle spasms 60 tablet 0   • dicyclomine (BENTYL) 20 mg tablet Take 1 tablet (20 mg total) by mouth 2 (two) times a day (Patient not taking: No sig reported) 60 tablet 1   • dihydroergotamine (MIGRANAL) 4 MG/ML nasal spray 1 spray into each nostril as needed for migraine Use in one nostril as directed  No more than 4 sprays in one hour 16 mL 0   • diphenoxylate-atropine (LOMOTIL) 2 5-0 025 mg per tablet 1 tablet 4 times daily as needed for diarrhea (Patient not taking: No sig reported) 30 tablet 1   • escitalopram (LEXAPRO) 20 mg tablet Take 1 tablet (20 mg total) by mouth daily 30 tablet 2   • etonogestrel-ethinyl estradiol (NuvaRing) 0 12-0 015 MG/24HR vaginal ring Insert ring for 21 days then remove for one week  3 each 4   • gabapentin (Neurontin) 100 mg capsule Take 1 capsule (100 mg total) by mouth 3 (three) times a day (Patient not taking: Reported on 11/8/2022) 30 capsule 0   • indomethacin (INDOCIN) 25 mg capsule 1 tab BID prn severe headache with food  Hold toradol  30 capsule 0   • ketorolac (TORADOL) 10 mg tablet Take 1 tablet (10 mg total) by mouth every 6 (six) hours as needed (migraine) Max 2-3 per week   10 tablet 0   • magnesium Oxide (MAG-OX) 400 mg TABS Take 1 tablet (400 mg total) by mouth daily 30 tablet 0   • meclizine (ANTIVERT) 12 5 MG tablet Take 1 tablet (12 5 mg total) by mouth 3 (three) times a day as needed for dizziness 30 tablet 0   • metFORMIN (GLUCOPHAGE-XR) 500 mg 24 hr tablet Take 2 tablets (1,000 mg total) by mouth daily with breakfast (Patient not taking: No sig reported) 60 tablet 2   • OLANZapine (ZyPREXA) 2 5 mg tablet Take 1 tablet (2 5 mg total) by mouth daily Do not take with reglan  30 tablet 2   • olmesartan (BENICAR) 20 mg tablet Take 1 tablet (20 mg total) by mouth daily (Patient not taking: Reported on 11/8/2022) 90 tablet 0   • onabotulinumtoxin A (BOTOX) 100 units Inject as directed     • ondansetron (ZOFRAN) 4 mg tablet Take 1 tablet (4 mg total) by mouth every 6 (six) hours 30 tablet 2   • pantoprazole (PROTONIX) 40 mg tablet Take 1 tablet (40 mg total) by mouth daily 30 tablet 3   • prazosin (MINIPRESS) 1 mg capsule Take 1 capsule (1 mg total) by mouth daily at bedtime 30 capsule 2   • Riboflavin 400 MG CAPS Take 1 capsule (400 mg total) by mouth in the morning 30 capsule 0   • Syringe/Needle, Disp, (SYRINGE 3CC/64EG3-1/4") 27G X 1-1/4" 3 ML MISC Use for IM injection of ketorolac   (Patient not taking: No sig reported) 4 each 0     Current Facility-Administered Medications   Medication Dose Route Frequency Provider Last Rate Last Admin   • cyanocobalamin injection 1,000 mcg  1,000 mcg Intramuscular Q30 Days Haskel Kobuk, DO   1,000 mcg at 08/03/20 3748   • cyanocobalamin injection 1,000 mcg  1,000 mcg Intramuscular Q14 Days Haskel Harini, DO   1,000 mcg at 05/18/20 1146   • cyanocobalamin injection 1,000 mcg  1,000 mcg Intramuscular Q30 Days Haskel Harini, DO   1,000 mcg at 09/01/20 1129        Allergies   Allergen Reactions   • Cimzia [Certolizumab Pegol] Swelling   • Desvenlafaxine      Other reaction(s): state of confusion   • Ixekizumab Vomiting   • Valproic Acid Other (See Comments)     Other reaction(s): dilated pupils, "schizi"   • Tizanidine Anxiety Review of Systems    Video Exam    There were no vitals filed for this visit      Physical Exam     11/11/22  Start Time: 0802  Stop Time: 0140  Total Visit Time: 50 minutes

## 2022-11-14 ENCOUNTER — TELEMEDICINE (OUTPATIENT)
Dept: NEUROLOGY | Facility: CLINIC | Age: 32
End: 2022-11-14

## 2022-11-14 DIAGNOSIS — M46.99 INFLAMMATORY SPONDYLOPATHY OF MULTIPLE SITES IN SPINE (HCC): ICD-10-CM

## 2022-11-14 DIAGNOSIS — M79.18 MYOFASCIAL MUSCLE PAIN: ICD-10-CM

## 2022-11-14 DIAGNOSIS — G43.709 CHRONIC MIGRAINE WITHOUT AURA WITHOUT STATUS MIGRAINOSUS, NOT INTRACTABLE: Primary | ICD-10-CM

## 2022-11-14 DIAGNOSIS — R25.1 PHYSIOLOGICAL TREMOR: ICD-10-CM

## 2022-11-14 DIAGNOSIS — M26.622 TENDERNESS OF LEFT TEMPOROMANDIBULAR JOINT: ICD-10-CM

## 2022-11-14 RX ORDER — BACLOFEN 10 MG/1
TABLET ORAL
Qty: 90 TABLET | Refills: 0 | Status: SHIPPED | OUTPATIENT
Start: 2022-11-14

## 2022-11-14 RX ORDER — INDOMETHACIN 25 MG/1
CAPSULE ORAL
Qty: 30 CAPSULE | Refills: 0 | Status: SHIPPED | OUTPATIENT
Start: 2022-11-14

## 2022-11-14 NOTE — PROGRESS NOTES
Virtual Regular Visit    Verification of patient location:    Patient is located in the following state in which I hold an active license [place]      Assessment/Plan:    Problem List Items Addressed This Visit        Cardiovascular and Mediastinum    Chronic migraine without aura without status migrainosus, not intractable - Primary    Relevant Medications    indomethacin (INDOCIN) 25 mg capsule    baclofen 10 mg tablet       Musculoskeletal and Integument    Inflammatory spondylopathy (HCC)       Other    Myofascial muscle pain    Physiological tremor    Tenderness of left temporomandibular joint    Relevant Medications    indomethacin (INDOCIN) 25 mg capsule    baclofen 10 mg tablet        1  Migraines recently much improved  Last flare of migraines may have had inflammatory component; pt thinks inflammatory/ Rheum condition triggered migraines  Last botox injection was helpful  Will continue q90 days  For severe migraine which cannot be broken by home analgesics, pt will be encouraged to contact me  DHE did work well previously  If needing DHE she may be directed to ED for infusion (DHE/ migranal not covered by insurance)  -- Continue Aimovig injection q30 days  -- Continue Botox q90 days  PRN migraine onset:  -- typically toradol or indocin are effective  She does not take together  -- decadron if above fails  -- triptans cause s/e  -- could consider nurtec again in the future, but recently not overly effective  She was recently switched to Zyprexa by Psychiatry, wondering if this is helping her migraines? Continue 2 5 mg tablet once daily  Continue regular eye exams  2  LEFT TMJ tenderness:  Trial baclofen for TMJ tenderness L>R  S/e reviewed  No popping, clicking or dysfunction of jaw, no dysphagia  Will hold ENT evaluation  Will proceed with second botox injection in Jan as scheduled and inject masseters   If botox AND baclofen ineffective, will consider TMJ/ OMFS referral and/or ENT at that time  Pt encouraged to consider acupuncture, PT or massage for jaw muscle spasm in addition to the above  Pt will consider TPIs if 10/10, and to contact me for these prior to ED visit  Also consider bite guard  3  Confusion/ disorientation:   - a/w migraines and EEG unremarkable  Will follow  4  Tremor, physiologic, stable:  - continue increased hydration as discussed before   - TSH previously ordered and will be recommended again if tremor worsens    5  Continue f/u with Rheumatology in Jan, consider starting Xeljanx  The patient should not hesitate to call me prior to her follow up with any questions or concerns  Reason for visit is   Chief Complaint   Patient presents with   • Migraine   • Virtual Regular Visit        Encounter provider Yuki Duckworth PA-C    Provider located at [address] [place] [address]  [phone]      Recent Visits  Date Type Provider Dept   11/11/22 Office Visit Ramon Cavazos DO [facility] [address] [facility] recent visits within past 7 days and meeting all other requirements  Today's Visits  Date Type Provider Dept   11/14/22 106 Shayna Barahona PA-C [facility]   Showing today's visits and meeting all other requirements  Future Appointments  No visits were found meeting these conditions  Showing future appointments within next 150 days and meeting all other requirements       The patient was identified by name and date of birth  Tasha Canas was informed that this is a telemedicine visit and that the visit is being conducted through the [facility]  She agrees to proceed     My office door was closed  No one else was in the room  She acknowledged consent and understanding of privacy and security of the video platform  The patient has agreed to participate and understands they can discontinue the visit at any time      Patient is aware this is a billable service  Maryann Matt is a 33 y o  female who is contacted via telemedicine for neurological follow-up  HPI     The patient had Botox injections again on 10/28/2022  Since then she feels that her migraine headaches are under better control  Since starting botox, the patient reports greater than 7 days of migraine relief from baseline, correlated with headache diary, decreased abortive medication use and decreased ER visits  She has lower grade headaches from time to time including today at 2 to 3/10, but thankfully she has been migraine free for a couple weeks now since the Botox  She is hoping that she continues to have reduced frequency of migraines  She was directly admitted to the hospital 8/23/2022 for a migraine infusion which did help but then migraines worsened a bit after hospitalization  She states DHE infusion and Migranal nasal spray were very effective but now not covered by her insurance  She does get relief with indomethacin most times she takes it  We tried several other different medications noted in the chart, but mostly they were ineffective  Current headache is left side of the head by her eye and forehead  She also has associated achy feeling in the left greater than right-sided jaw which has been new over the past few weeks  She saw her PCP who gave her Flexeril and ENT referral   The patient did not schedule ENT referral   She tried Flexeril but it was not overly beneficial except causing sedation  She feels that her teeth and that region in the jaw are squeezing, and she gets the same sensation behind the ear  She has a squeezing sensation in both the top and bottom jaw on the left side  No sharp or electric feelings  No numbness or facial droop  No popping, clicking of the jaw, no jaw dysfunction  No swallowing difficulty  She denies vision issues    She reports talking and eating triggers the pain to be worse but she has it at about a 4 or 5/10 on a daily basis  She saw her dentist with normal x-rays thankfully  She was not prescribed a bite guard  Cold packs are alleviating  She does not notice that cold triggers the pain  Past Medical History:   Diagnosis Date   • Abnormal Pap smear of cervix    • Allergic    • Anxiety    • Arthritis    • Depression    • Diabetes mellitus (HCC)    • Fibromyalgia, primary    • Hypertension    • IBS (irritable bowel syndrome)    • Migraine    • Obesity    • Vitamin B12 deficiency        Past Surgical History:   Procedure Laterality Date   • COLONOSCOPY N/A 5/8/2018    Procedure: COLONOSCOPY;  Surgeon: Jaki Page MD;  Location: [facility]; Service: Gastroenterology   • AR EXC SKIN BENIG >4 CM REMAINDR BODY N/A 7/1/2021    Procedure: EXCISION OF PILAR SCALP CYST X 2 AND RIGHT INNER GROIN NEVVUS;  Surgeon: Coleen Mensah MD;  Location: [address] [place] [address];  Service: Plastics   • AR RECMPL WND SCALP,EXTR 2 6-7 5 CM N/A 7/1/2021    Procedure: CLOSURE WOUND SCALP X 2 AND RIGHT INNER GROIN;  Surgeon: Coleen Mensah MD;  Location: [address] [place] [address];  Service: Plastics   • TONSILLECTOMY     • WISDOM TOOTH EXTRACTION         Current Outpatient Medications   Medication Sig Dispense Refill   • Aimovig 140 MG/ML SOAJ Inject 140mg under the skin every 30 (thirty) days  1 mL 10   • ALPRAZolam ER (XANAX XR) 1 MG 24 hr tablet Take 1 tablet (1 mg total) by mouth every morning 30 tablet 0   • baclofen 10 mg tablet 1 tab TID prn facial pain, jaw pain  Hold flexeril  90 tablet 0   • buPROPion (WELLBUTRIN XL) 300 mg 24 hr tablet Take 1 tablet (300 mg total) by mouth daily 90 tablet 0   • dihydroergotamine (MIGRANAL) 4 MG/ML nasal spray 1 spray into each nostril as needed for migraine Use in one nostril as directed    No more than 4 sprays in one hour 16 mL 0   • escitalopram (LEXAPRO) 20 mg tablet Take 1 tablet (20 mg total) by mouth daily 30 tablet 2   • etonogestrel-ethinyl estradiol (NuvaRing) 0 12-0 015 MG/24HR vaginal ring Insert ring for 21 days then remove for one week  3 each 4   • indomethacin (INDOCIN) 25 mg capsule 1-2 tabs BID prn severe headache with food  Hold toradol  30 capsule 0   • ketorolac (TORADOL) 10 mg tablet Take 1 tablet (10 mg total) by mouth every 6 (six) hours as needed (migraine) Max 2-3 per week  10 tablet 0   • magnesium Oxide (MAG-OX) 400 mg TABS Take 1 tablet (400 mg total) by mouth daily 30 tablet 0   • meclizine (ANTIVERT) 12 5 MG tablet Take 1 tablet (12 5 mg total) by mouth 3 (three) times a day as needed for dizziness 30 tablet 0   • nystatin (MYCOSTATIN) powder Apply topically 4 (four) times a day 60 g 5   • OLANZapine (ZyPREXA) 2 5 mg tablet Take 1 tablet (2 5 mg total) by mouth daily Do not take with reglan   30 tablet 2   • ondansetron (ZOFRAN) 4 mg tablet Take 1 tablet (4 mg total) by mouth every 6 (six) hours 30 tablet 2   • pantoprazole (PROTONIX) 40 mg tablet Take 1 tablet (40 mg total) by mouth daily 30 tablet 3   • pramipexole (MIRAPEX) 0 25 mg tablet Take 1 tablet (0 25 mg total) by mouth daily at bedtime 90 tablet 1   • prazosin (MINIPRESS) 1 mg capsule Take 1 capsule (1 mg total) by mouth daily at bedtime 30 capsule 2   • B-D 3CC LUER-DEANN SYR 25GX1" 25G X 1" 3 ML MISC   (Patient not taking: Reported on 11/8/2022)     • dicyclomine (BENTYL) 20 mg tablet Take 1 tablet (20 mg total) by mouth 2 (two) times a day (Patient not taking: No sig reported) 60 tablet 1   • diphenoxylate-atropine (LOMOTIL) 2 5-0 025 mg per tablet 1 tablet 4 times daily as needed for diarrhea (Patient not taking: No sig reported) 30 tablet 1   • gabapentin (Neurontin) 100 mg capsule Take 1 capsule (100 mg total) by mouth 3 (three) times a day (Patient not taking: No sig reported) 30 capsule 0   • metFORMIN (GLUCOPHAGE-XR) 500 mg 24 hr tablet Take 2 tablets (1,000 mg total) by mouth daily with breakfast (Patient not taking: No sig reported) 60 tablet 2   • olmesartan (BENICAR) 20 mg tablet Take 1 tablet (20 mg total) by mouth daily (Patient not taking: Reported on 11/8/2022) 90 tablet 0   • onabotulinumtoxin A (BOTOX) 100 units Inject as directed     • Riboflavin 400 MG CAPS Take 1 capsule (400 mg total) by mouth in the morning (Patient not taking: Reported on 11/14/2022) 30 capsule 0   • Syringe/Needle, Disp, (SYRINGE 3CC/84EN1-2/4") 27G X 1-1/4" 3 ML MISC Use for IM injection of ketorolac  (Patient not taking: No sig reported) 4 each 0     Current Facility-Administered Medications   Medication Dose Route Frequency Provider Last Rate Last Admin   • cyanocobalamin injection 1,000 mcg  1,000 mcg Intramuscular Q30 Days Caren Tamazight, DO   1,000 mcg at 08/03/20 0731   • cyanocobalamin injection 1,000 mcg  1,000 mcg Intramuscular Q14 Days Caren Tamazight, DO   1,000 mcg at 05/18/20 1146   • cyanocobalamin injection 1,000 mcg  1,000 mcg Intramuscular Q30 Days Caren Tamazight, DO   1,000 mcg at 09/01/20 1129        Allergies   Allergen Reactions   • Cimzia [Certolizumab Pegol] Swelling   • Desvenlafaxine      Other reaction(s): state of confusion   • Ixekizumab Vomiting   • Valproic Acid Other (See Comments)     Other reaction(s): dilated pupils, "schizi"   • Tizanidine Anxiety       Review of Systems   Constitutional: Negative for fever  HENT: Negative  Negative for hearing loss, tinnitus, trouble swallowing and voice change  Eyes: Positive for pain  Negative for photophobia and visual disturbance  Respiratory: Negative  Negative for shortness of breath  Cardiovascular: Negative  Negative for palpitations  Gastrointestinal: Negative  Negative for nausea and vomiting  Endocrine: Negative  Negative for cold intolerance  Genitourinary: Negative  Negative for dysuria, frequency and urgency  Musculoskeletal: Negative  Negative for gait problem, myalgias and neck pain  Skin: Negative  Negative for rash  Allergic/Immunologic: Negative  Neurological: Positive for headaches   Negative for dizziness, tremors, seizures, syncope, facial asymmetry, speech difficulty, weakness, light-headedness and numbness  Hematological: Negative  Does not bruise/bleed easily  Psychiatric/Behavioral: Negative  Negative for confusion, hallucinations and sleep disturbance  The following portions of the patient's history were reviewed and updated as appropriate: allergies, current medications/ medication history, past family history, past medical history, past social history, past surgical history and problem list     Review of systems was reviewed and otherwise unremarkable from a neurological perspective  Video Exam    There were no vitals filed for this visit  Physical Exam   Neurological exam:  On neurologic exam, the patient is alert and oriented to time and place  Speech is fluent and articulate, and the patient follows commands appropriately  Judgment and affect appear normal   Extraocular muscles are intact without nystagmus  Face is symmetric  Hearing is intact bilaterally  Motor examination reveals adequate range of motion        I spent 30 minutes directly with the patient during this visit

## 2022-11-14 NOTE — PATIENT INSTRUCTIONS
Acupuncture locations near [address]- [phone]  [facility]

## 2022-11-16 LAB
LAB AP GYN PRIMARY INTERPRETATION: NORMAL
Lab: NORMAL

## 2022-11-25 ENCOUNTER — APPOINTMENT (EMERGENCY)
Dept: CT IMAGING | Facility: HOSPITAL | Age: 32
End: 2022-11-25

## 2022-11-25 ENCOUNTER — APPOINTMENT (EMERGENCY)
Dept: RADIOLOGY | Facility: HOSPITAL | Age: 32
End: 2022-11-25

## 2022-11-25 ENCOUNTER — HOSPITAL ENCOUNTER (EMERGENCY)
Facility: HOSPITAL | Age: 32
Discharge: HOME | End: 2022-11-25
Attending: EMERGENCY MEDICINE

## 2022-11-25 ENCOUNTER — TELEMEDICINE (OUTPATIENT)
Dept: BEHAVIORAL/MENTAL HEALTH CLINIC | Facility: CLINIC | Age: 32
End: 2022-11-25

## 2022-11-25 VITALS
SYSTOLIC BLOOD PRESSURE: 129 MMHG | BODY MASS INDEX: 45.18 KG/M2 | HEART RATE: 86 BPM | TEMPERATURE: 97.8 F | RESPIRATION RATE: 16 BRPM | HEIGHT: 63 IN | DIASTOLIC BLOOD PRESSURE: 74 MMHG | OXYGEN SATURATION: 97 % | WEIGHT: 255 LBS

## 2022-11-25 DIAGNOSIS — M94.0 COSTOCHONDRITIS: ICD-10-CM

## 2022-11-25 DIAGNOSIS — F41.1 GENERALIZED ANXIETY DISORDER: Chronic | ICD-10-CM

## 2022-11-25 DIAGNOSIS — R06.02 SHORTNESS OF BREATH: ICD-10-CM

## 2022-11-25 DIAGNOSIS — F33.1 DEPRESSION, MAJOR, RECURRENT, MODERATE (HCC): Primary | Chronic | ICD-10-CM

## 2022-11-25 DIAGNOSIS — R07.89 ATYPICAL CHEST PAIN: Primary | ICD-10-CM

## 2022-11-25 LAB
ANION GAP SERPL CALCULATED.3IONS-SCNC: 11 MMOL/L (ref 4–13)
ATRIAL RATE: 82 BPM
BASOPHILS # BLD AUTO: 0.02 THOUSANDS/ÂΜL (ref 0–0.1)
BASOPHILS NFR BLD AUTO: 0 % (ref 0–1)
BUN SERPL-MCNC: 8 MG/DL (ref 5–25)
CALCIUM SERPL-MCNC: 9 MG/DL (ref 8.4–10.2)
CHLORIDE SERPL-SCNC: 105 MMOL/L (ref 96–108)
CO2 SERPL-SCNC: 19 MMOL/L (ref 21–32)
CREAT SERPL-MCNC: 0.57 MG/DL (ref 0.6–1.3)
D DIMER PPP FEU-MCNC: 0.51 UG/ML FEU
EOSINOPHIL # BLD AUTO: 0.22 THOUSAND/ÂΜL (ref 0–0.61)
EOSINOPHIL NFR BLD AUTO: 3 % (ref 0–6)
ERYTHROCYTE [DISTWIDTH] IN BLOOD BY AUTOMATED COUNT: 13.5 % (ref 11.6–15.1)
GFR SERPL CREATININE-BSD FRML MDRD: 123 ML/MIN/1.73SQ M
GLUCOSE SERPL-MCNC: 92 MG/DL (ref 65–140)
HCT VFR BLD AUTO: 33.2 % (ref 34.8–46.1)
HGB BLD-MCNC: 10.8 G/DL (ref 11.5–15.4)
IMM GRANULOCYTES # BLD AUTO: 0.02 THOUSAND/UL (ref 0–0.2)
IMM GRANULOCYTES NFR BLD AUTO: 0 % (ref 0–2)
LYMPHOCYTES # BLD AUTO: 2.68 THOUSANDS/ÂΜL (ref 0.6–4.47)
LYMPHOCYTES NFR BLD AUTO: 37 % (ref 14–44)
MCH RBC QN AUTO: 27.9 PG (ref 26.8–34.3)
MCHC RBC AUTO-ENTMCNC: 32.5 G/DL (ref 31.4–37.4)
MCV RBC AUTO: 86 FL (ref 82–98)
MONOCYTES # BLD AUTO: 0.42 THOUSAND/ÂΜL (ref 0.17–1.22)
MONOCYTES NFR BLD AUTO: 6 % (ref 4–12)
NEUTROPHILS # BLD AUTO: 3.8 THOUSANDS/ÂΜL (ref 1.85–7.62)
NEUTS SEG NFR BLD AUTO: 54 % (ref 43–75)
NRBC BLD AUTO-RTO: 0 /100 WBCS
P AXIS: 36 DEGREES
PLATELET # BLD AUTO: 428 THOUSANDS/UL (ref 149–390)
PMV BLD AUTO: 8.6 FL (ref 8.9–12.7)
POTASSIUM SERPL-SCNC: 4 MMOL/L (ref 3.5–5.3)
PR INTERVAL: 162 MS
QRS AXIS: 17 DEGREES
QRSD INTERVAL: 88 MS
QT INTERVAL: 380 MS
QTC INTERVAL: 443 MS
RBC # BLD AUTO: 3.87 MILLION/UL (ref 3.81–5.12)
SODIUM SERPL-SCNC: 135 MMOL/L (ref 135–147)
T WAVE AXIS: 12 DEGREES
VENTRICULAR RATE: 82 BPM
WBC # BLD AUTO: 7.16 THOUSAND/UL (ref 4.31–10.16)

## 2022-11-25 RX ORDER — KETOROLAC TROMETHAMINE 30 MG/ML
15 INJECTION, SOLUTION INTRAMUSCULAR; INTRAVENOUS ONCE
Status: COMPLETED | OUTPATIENT
Start: 2022-11-25 | End: 2022-11-25

## 2022-11-25 RX ADMIN — IOHEXOL 66 ML: 350 INJECTION, SOLUTION INTRAVENOUS at 12:02

## 2022-11-25 RX ADMIN — KETOROLAC TROMETHAMINE 15 MG: 30 INJECTION, SOLUTION INTRAMUSCULAR at 10:11

## 2022-11-25 NOTE — Clinical Note
Sadie Rodas was seen and treated in our emergency department on 11/25/2022  No restrictions            Diagnosis:     Cruz    She may return on this date: If you have any questions or concerns, please don't hesitate to call        Harley Xiong DO    ______________________________           _______________          _______________  Hospital Representative                              Date                                Time

## 2022-11-25 NOTE — DISCHARGE INSTRUCTIONS
Return if symptoms worsen or if new symptoms develop  Follow up with your primary physiican  Take 600mg ibuprofen (motrin) every 6 hours for pain

## 2022-11-25 NOTE — ED PROVIDER NOTES
History  Chief Complaint   Patient presents with   • Chest Pain   • Shortness of Breath     C/o CP and SOB since last night  36-year-old female presenting with chest pain which started yesterday evening  Patient describes anterior chest pain, worse with deep inhalation  Patient states that similar to her prior episodes of costochondritis  She has a NuvaRing contraception  She denies history of PE  Denies significant cardiac history  Chest Pain  Associated symptoms: shortness of breath    Shortness of Breath  Associated symptoms: chest pain        Prior to Admission Medications   Prescriptions Last Dose Informant Patient Reported? Taking? ALPRAZolam ER (XANAX XR) 1 MG 24 hr tablet   No No   Sig: Take 1 tablet (1 mg total) by mouth every morning   Aimovig 140 MG/ML SOAJ   No No   Sig: Inject 140mg under the skin every 30 (thirty) days  B-D 3CC LUER-DEANN SYR 25GX1" 25G X 1" 3 ML MISC   Yes No   Sig:     Patient not taking: Reported on 2022   OLANZapine (ZyPREXA) 2 5 mg tablet   No No   Sig: Take 1 tablet (2 5 mg total) by mouth daily Do not take with reglan  Riboflavin 400 MG CAPS   No No   Sig: Take 1 capsule (400 mg total) by mouth in the morning   Patient not taking: Reported on 2022   Syringe/Needle, Disp, (SYRINGE 3CC/66KN8-9/4") 27G X 1-1/4" 3 ML MISC   No No   Sig: Use for IM injection of ketorolac  Patient not taking: No sig reported   baclofen 10 mg tablet   No No   Si tab TID prn facial pain, jaw pain  Hold flexeril  buPROPion (WELLBUTRIN XL) 300 mg 24 hr tablet   No No   Sig: Take 1 tablet (300 mg total) by mouth daily   dicyclomine (BENTYL) 20 mg tablet   No No   Sig: Take 1 tablet (20 mg total) by mouth 2 (two) times a day   Patient not taking: No sig reported   dihydroergotamine (MIGRANAL) 4 MG/ML nasal spray   No No   Si spray into each nostril as needed for migraine Use in one nostril as directed    No more than 4 sprays in one hour   diphenoxylate-atropine (LOMOTIL) 2 5-0 025 mg per tablet   No No   Si tablet 4 times daily as needed for diarrhea   Patient not taking: No sig reported   escitalopram (LEXAPRO) 20 mg tablet   No No   Sig: Take 1 tablet (20 mg total) by mouth daily   etonogestrel-ethinyl estradiol (NuvaRing) 0 12-0 015 MG/24HR vaginal ring   No No   Sig: Insert ring for 21 days then remove for one week    gabapentin (Neurontin) 100 mg capsule   No No   Sig: Take 1 capsule (100 mg total) by mouth 3 (three) times a day   Patient not taking: No sig reported   indomethacin (INDOCIN) 25 mg capsule   No No   Si-2 tabs BID prn severe headache with food   Hold toradol    ketorolac (TORADOL) 10 mg tablet   No No   Sig: Take 1 tablet (10 mg total) by mouth every 6 (six) hours as needed (migraine) Max 2-3 per week    magnesium Oxide (MAG-OX) 400 mg TABS   No No   Sig: Take 1 tablet (400 mg total) by mouth daily   meclizine (ANTIVERT) 12 5 MG tablet   No No   Sig: Take 1 tablet (12 5 mg total) by mouth 3 (three) times a day as needed for dizziness   metFORMIN (GLUCOPHAGE-XR) 500 mg 24 hr tablet   No No   Sig: Take 2 tablets (1,000 mg total) by mouth daily with breakfast   Patient not taking: No sig reported   nystatin (MYCOSTATIN) powder   No No   Sig: Apply topically 4 (four) times a day   olmesartan (BENICAR) 20 mg tablet   No No   Sig: Take 1 tablet (20 mg total) by mouth daily   Patient not taking: Reported on 2022   onabotulinumtoxin A (BOTOX) 100 units   Yes No   Sig: Inject as directed   ondansetron (ZOFRAN) 4 mg tablet   No No   Sig: Take 1 tablet (4 mg total) by mouth every 6 (six) hours   pantoprazole (PROTONIX) 40 mg tablet   No No   Sig: Take 1 tablet (40 mg total) by mouth daily   pramipexole (MIRAPEX) 0 25 mg tablet   No No   Sig: Take 1 tablet (0 25 mg total) by mouth daily at bedtime   prazosin (MINIPRESS) 1 mg capsule   No No   Sig: Take 1 capsule (1 mg total) by mouth daily at bedtime      Facility-Administered Medications Last Administration Doses Remaining   cyanocobalamin injection 1,000 mcg 8/3/2020  8:59 AM    cyanocobalamin injection 1,000 mcg 5/18/2020 11:46 AM    cyanocobalamin injection 1,000 mcg 9/1/2020 11:29 AM           Past Medical History:   Diagnosis Date   • Abnormal Pap smear of cervix    • Allergic    • Anxiety    • Arthritis    • Depression    • Diabetes mellitus (HCC)    • Fibromyalgia, primary    • Hypertension    • IBS (irritable bowel syndrome)    • Migraine    • Obesity    • Vitamin B12 deficiency        Past Surgical History:   Procedure Laterality Date   • COLONOSCOPY N/A 5/8/2018    Procedure: COLONOSCOPY;  Surgeon: Carmen Mckenzie MD;  Location: [facility]; Service: Gastroenterology   • SC EXC SKIN BENIG >4 CM REMAINDR BODY N/A 7/1/2021    Procedure: EXCISION OF PILAR SCALP CYST X 2 AND RIGHT INNER GROIN NEVVUS;  Surgeon: Ellie Kamara MD;  Location: [address] [place] [address] OR;  Service: Plastics   • SC RECMPL WND SCALP,EXTR 2 6-7 5 CM N/A 7/1/2021    Procedure: CLOSURE WOUND SCALP X 2 AND RIGHT INNER GROIN;  Surgeon: Ellie Kamara MD;  Location: [address] [place] [address] OR;  Service: Plastics   • TONSILLECTOMY     • WISDOM TOOTH EXTRACTION         Family History   Problem Relation Age of Onset   • Rheum arthritis Mother    • Psoriasis Mother    • Other Mother    • Hypertension Mother    • Diabetes unspecified Mother    • Sjogren's syndrome Mother    • Alcohol abuse Mother    • Drug abuse Mother    • Anxiety disorder Father    • Alcohol abuse Father    • Lung cancer Maternal Grandfather    • Cancer Other         bone   • Diabetes Other    • Other Other         High blood pressure   • Depression Maternal Grandmother      I have reviewed and agree with the history as documented      E-Cigarette/Vaping   • E-Cigarette Use Current Some Day User    • Start Date 7/1/19    • Comments medical marijuana      E-Cigarette/Vaping Substances   • Nicotine No    • THC Yes    • CBD Yes    • Flavoring No    • Other No    • Unknown No      Social History Tobacco Use   • Smoking status: Never   • Smokeless tobacco: Never   Vaping Use   • Vaping Use: Some days   • Start date: 7/1/2019   • Substances: THC, CBD   Substance Use Topics   • Alcohol use: Yes     Comment: rarely   • Drug use: Yes     Frequency: 2 0 times per week     Types: Marijuana     Comment: medical marijuana (vape)       Review of Systems   Respiratory: Positive for shortness of breath  Cardiovascular: Positive for chest pain  Physical Exam  Physical Exam  Constitutional:       Appearance: She is well-developed and well-nourished  HENT:      Head: Normocephalic and atraumatic  Mouth/Throat:      Mouth: Oropharynx is clear and moist    Eyes:      Extraocular Movements: EOM normal       Conjunctiva/sclera: Conjunctivae normal       Pupils: Pupils are equal, round, and reactive to light  Neck:      Trachea: No tracheal deviation  Cardiovascular:      Rate and Rhythm: Normal rate and regular rhythm  Heart sounds: Normal heart sounds  No murmur heard  Pulmonary:      Effort: Pulmonary effort is normal  No respiratory distress  Breath sounds: Normal breath sounds  No wheezing or rales  Chest:      Chest wall: Tenderness present  Abdominal:      General: Bowel sounds are normal  There is no distension  Palpations: Abdomen is soft  Tenderness: There is no abdominal tenderness  Musculoskeletal:         General: No deformity or edema  Cervical back: Normal range of motion and neck supple  Skin:     General: Skin is warm and dry  Capillary Refill: Capillary refill takes less than 2 seconds  Neurological:      Mental Status: She is alert and oriented to person, place, and time  Sensory: No sensory deficit     Psychiatric:         Mood and Affect: Mood and affect normal          Judgment: Judgment normal          Vital Signs  ED Triage Vitals [11/25/22 0927]   Temperature Pulse Respirations Blood Pressure SpO2   97 8 °F (36 6 °C) 94 18 152/75 96 % Temp Source Heart Rate Source Patient Position - Orthostatic VS BP Location FiO2 (%)   Tympanic Monitor Sitting Left arm --      Pain Score       7           Vitals:    11/25/22 0927 11/25/22 1145   BP: 152/75 129/74   Pulse: 94 86   Patient Position - Orthostatic VS: Sitting          Visual Acuity      ED Medications  Medications   ketorolac (TORADOL) injection 15 mg (15 mg Intramuscular Given 11/25/22 1011)   iohexol (OMNIPAQUE) 350 MG/ML injection (SINGLE-DOSE) 66 mL (66 mL Intravenous Given 11/25/22 1202)       Diagnostic Studies  Results Reviewed     Procedure Component Value Units Date/Time    Basic metabolic panel [[id number]]  (Abnormal) Collected: 11/25/22 1122    Lab Status: Final result Specimen: Blood from Arm, Left Updated: 11/25/22 1154     Sodium 135 mmol/L      Potassium 4 0 mmol/L      Chloride 105 mmol/L      CO2 19 mmol/L      ANION GAP 11 mmol/L      BUN 8 mg/dL      Creatinine 0 57 mg/dL      Glucose 92 mg/dL      Calcium 9 0 mg/dL      eGFR 123 ml/min/1 73sq m     Narrative:      [place] guidelines for Chronic Kidney Disease (CKD):   •  Stage 1 with normal or high GFR (GFR > 90 mL/min/1 73 square meters)  •  Stage 2 Mild CKD (GFR = 60-89 mL/min/1 73 square meters)  •  Stage 3A Moderate CKD (GFR = 45-59 mL/min/1 73 square meters)  •  Stage 3B Moderate CKD (GFR = 30-44 mL/min/1 73 square meters)  •  Stage 4 Severe CKD (GFR = 15-29 mL/min/1 73 square meters)  •  Stage 5 End Stage CKD (GFR <15 mL/min/1 73 square meters)  Note: GFR calculation is accurate only with a steady state creatinine    CBC and differential [[id number]]  (Abnormal) Collected: 11/25/22 1122    Lab Status: Final result Specimen: Blood from Arm, Left Updated: 11/25/22 1126     WBC 7 16 Thousand/uL      RBC 3 87 Million/uL      Hemoglobin 10 8 g/dL      Hematocrit 33 2 %      MCV 86 fL      MCH 27 9 pg      MCHC 32 5 g/dL      RDW 13 5 %      MPV 8 6 fL      Platelets 066 Thousands/uL      nRBC 0 /100 WBCs      Neutrophils Relative 54 %      Immat GRANS % 0 %      Lymphocytes Relative 37 %      Monocytes Relative 6 %      Eosinophils Relative 3 %      Basophils Relative 0 %      Neutrophils Absolute 3 80 Thousands/µL      Immature Grans Absolute 0 02 Thousand/uL      Lymphocytes Absolute 2 68 Thousands/µL      Monocytes Absolute 0 42 Thousand/µL      Eosinophils Absolute 0 22 Thousand/µL      Basophils Absolute 0 02 Thousands/µL     D-dimer, quantitative [[id number]]  (Abnormal) Collected: 11/25/22 1022    Lab Status: Final result Specimen: Blood from Arm, Right Updated: 11/25/22 1048     D-Dimer, Quant 0 51 ug/ml FEU                  CTA ED chest PE Study   Final Result by Wendy Gongora MD (11/25 1217)      No pulmonary embolus  No acute pulmonary disease  Workstation performed: [id number]         XR chest 1 view portable   Final Result by Tawanna Loco DO (11/25 1027)      No acute cardiopulmonary disease                    Workstation performed: [id number]                    Procedures  ECG 12 Lead Documentation Only    Date/Time: 11/25/2022 2:14 PM  Performed by: Ranjan Newton DO  Authorized by: Ranjan Newton DO     Indications / Diagnosis:  CP  ECG reviewed by me, the ED Provider: yes    Patient location:  ED  Previous ECG:     Previous ECG:  Compared to current    Similarity:  No change    Comparison to cardiac monitor: Yes    Interpretation:     Interpretation: normal    Rate:     ECG rate assessment: normal    Rhythm:     Rhythm: sinus rhythm    Ectopy:     Ectopy: none    QRS:     QRS axis:  Normal  Conduction:     Conduction: normal    ST segments:     ST segments:  Normal  T waves:     T waves: normal    Comments:      No evidence of acute cardiac ischemia             ED Course                               SBIRT 20yo+    Flowsheet Row Most Recent Value   SBIRT (23 yo +)    In order to provide better care to our patients, we are screening all of our patients for alcohol and drug use  Would it be okay to ask you these screening questions? Yes Filed at: 11/25/2022 1122   Initial Alcohol Screen: US AUDIT-C     1  How often do you have a drink containing alcohol? 0 Filed at: 11/25/2022 1122   2  How many drinks containing alcohol do you have on a typical day you are drinking? 0 Filed at: 11/25/2022 1122   3a  Male UNDER 65: How often do you have five or more drinks on one occasion? 0 Filed at: 11/25/2022 1122   3b  FEMALE Any Age, or MALE 65+: How often do you have 4 or more drinks on one occassion? 0 Filed at: 11/25/2022 1122   Audit-C Score 0 Filed at: 11/25/2022 1122   BRUCE: How many times in the past year have you    Used an illegal drug or used a prescription medication for non-medical reasons?  Never Filed at: 11/25/2022 1122                    MDM  Number of Diagnoses or Management Options  Atypical chest pain: new and requires workup  Costochondritis: new and requires workup  Shortness of breath: new and requires workup  Diagnosis management comments: 60-year-old female with reproducible tenderness on exam  However due to chest pain, shortness of breath unavailable rule out pulmonary embolism with PE rule out criteria  D-dimer slightly elevated, CTA reassuring  Patient's symptoms improved with Toradol, comfortable with discharge and outpatient follow-up  Symptoms not consistent with ACS, dissection, center       Amount and/or Complexity of Data Reviewed  Review and summarize past medical records: yes  Independent visualization of images, tracings, or specimens: yes    Risk of Complications, Morbidity, and/or Mortality  Presenting problems: moderate  Diagnostic procedures: minimal  Management options: moderate        Disposition  Final diagnoses:   Atypical chest pain   Shortness of breath   Costochondritis     Time reflects when diagnosis was documented in both MDM as applicable and the Disposition within this note     Time User Action Codes Description Comment    11/25/2022 12:24 PM Leola Workman Add [R07 89] Atypical chest pain     11/25/2022 12:24 PM Leola Workman Add [R06 02] Shortness of breath     11/25/2022 12:24 PM Leola Workman Add [M94 0] Costochondritis       ED Disposition     ED Disposition   Discharge    Condition   Stable    Date/Time   Fri Nov 25, 2022 12:24 PM    Comment   Rock Villarreal discharge to home/self care  Follow-up Information     Follow up With Specialties Details Why Contact Info    Mary Martin DO Family Medicine Schedule an appointment as soon as possible for a visit   [place] [address]  [phone]            Discharge Medication List as of 11/25/2022 12:24 PM      CONTINUE these medications which have NOT CHANGED    Details   Aimovig 140 MG/ML SOAJ Inject 140mg under the skin every 30 (thirty) days  , Normal      ALPRAZolam ER (XANAX XR) 1 MG 24 hr tablet Take 1 tablet (1 mg total) by mouth every morning, Starting Tue 11/8/2022, Normal      !! B-D 3CC LUER-DEANN SYR 25GX1" 25G X 1" 3 ML MISC  , Starting Thu 7/26/2018, Historical Med      baclofen 10 mg tablet 1 tab TID prn facial pain, jaw pain  Hold flexeril , Normal      buPROPion (WELLBUTRIN XL) 300 mg 24 hr tablet Take 1 tablet (300 mg total) by mouth daily, Starting Thu 9/22/2022, Normal      dicyclomine (BENTYL) 20 mg tablet Take 1 tablet (20 mg total) by mouth 2 (two) times a day, Starting Fri 5/27/2022, Normal      dihydroergotamine (MIGRANAL) 4 MG/ML nasal spray 1 spray into each nostril as needed for migraine Use in one nostril as directed    No more than 4 sprays in one hour, Starting Wed 8/31/2022, Normal      diphenoxylate-atropine (LOMOTIL) 2 5-0 025 mg per tablet 1 tablet 4 times daily as needed for diarrhea, Normal      escitalopram (LEXAPRO) 20 mg tablet Take 1 tablet (20 mg total) by mouth daily, Starting Thu 9/22/2022, Normal      etonogestrel-ethinyl estradiol (NuvaRing) 0 12-0 015 MG/24HR vaginal ring Insert ring for 21 days then remove for one week , Normal      gabapentin (Neurontin) 100 mg capsule Take 1 capsule (100 mg total) by mouth 3 (three) times a day, Starting Wed 9/28/2022, Normal      indomethacin (INDOCIN) 25 mg capsule 1-2 tabs BID prn severe headache with food   Hold toradol , Normal      ketorolac (TORADOL) 10 mg tablet Take 1 tablet (10 mg total) by mouth every 6 (six) hours as needed (migraine) Max 2-3 per week , Starting Fri 9/30/2022, Normal      magnesium Oxide (MAG-OX) 400 mg TABS Take 1 tablet (400 mg total) by mouth daily, Starting Fri 10/28/2022, Until Sun 11/27/2022, Normal      meclizine (ANTIVERT) 12 5 MG tablet Take 1 tablet (12 5 mg total) by mouth 3 (three) times a day as needed for dizziness, Starting Wed 8/3/2022, Normal      metFORMIN (GLUCOPHAGE-XR) 500 mg 24 hr tablet Take 2 tablets (1,000 mg total) by mouth daily with breakfast, Starting Tue 5/10/2022, Normal      nystatin (MYCOSTATIN) powder Apply topically 4 (four) times a day, Starting Fri 11/11/2022, Normal      OLANZapine (ZyPREXA) 2 5 mg tablet Take 1 tablet (2 5 mg total) by mouth daily Do not take with reglan , Starting Thu 9/22/2022, Normal      olmesartan (BENICAR) 20 mg tablet Take 1 tablet (20 mg total) by mouth daily, Starting Wed 9/28/2022, Normal      onabotulinumtoxin A (BOTOX) 100 units Inject as directed, Starting Tue 8/29/2017, Historical Med      ondansetron (ZOFRAN) 4 mg tablet Take 1 tablet (4 mg total) by mouth every 6 (six) hours, Starting Fri 5/27/2022, Normal      pantoprazole (PROTONIX) 40 mg tablet Take 1 tablet (40 mg total) by mouth daily, Starting Fri 5/27/2022, Normal      pramipexole (MIRAPEX) 0 25 mg tablet Take 1 tablet (0 25 mg total) by mouth daily at bedtime, Starting Fri 11/11/2022, Normal      prazosin (MINIPRESS) 1 mg capsule Take 1 capsule (1 mg total) by mouth daily at bedtime, Starting Thu 9/22/2022, Normal      Riboflavin 400 MG CAPS Take 1 capsule (400 mg total) by mouth in the morning, Starting Sun 8/7/2022, Until Mon 11/14/2022, Normal      !! Syringe/Needle, Disp, (SYRINGE 3CC/77NZ2-1/4") 27G X 1-1/4" 3 ML MISC Use for IM injection of ketorolac , Normal       !! - Potential duplicate medications found  Please discuss with provider  No discharge procedures on file      PDMP Review       Value Time User    PDMP Reviewed  Yes 11/8/2022  1:22 PM Franki Felty, MD          ED Provider  Electronically Signed by           Malia Almanzar DO  11/25/22 8770

## 2022-11-25 NOTE — PSYCH
Virtual Regular Visit    Verification of patient location:    Patient is located in the following state in which I hold an active license [place]    Assessment/Plan:    Problem List Items Addressed This Visit        Other    Generalized anxiety disorder (Chronic)    Depression, major, recurrent, moderate (HCC) - Primary (Chronic)     Goals addressed in session: Goal 1      Reason for visit is No chief complaint on file  Encounter provider APARNA Alvarado    Provider located at [address] [place] [id number]  [phone]    Recent Visits  No visits were found meeting these conditions  Showing recent visits within past 7 days and meeting all other requirements  Future Appointments  No visits were found meeting these conditions  Showing future appointments within next 150 days and meeting all other requirements     The patient was identified by name and date of birth  Ria Martínez was informed that this is a telemedicine visit and that the visit is being conducted throughthe [facility]  She agrees to proceed     My office door was closed  No one else was in the room  She acknowledged consent and understanding of privacy and security of the video platform  The patient has agreed to participate and understands they can discontinue the visit at any time  Patient is aware this is a billable service  Wali Schultz is a 33 y o  female  DATA: Met with Stephanie for scheduled individual session  Topics of discussion included relationships with family, physical health concerns, relationships with friends and mood regulation and symptoms  Stephanie discussed her health-related issues  She states that she has been feeling an increase in pain  She has ambivalence about taking her immunosuppressant medications, as she fears getting ill   She states that she has been approved for infusions for her migraines  Betina Henriquez discussed how her physical health and pain impedes her ability to exercise  We discussed potential alternatives that are not as high intensity  Betina Henriquez discussed her friendship with her neighbor  We discussed Stephanie's friendships and use of DBT communication skills  We discussed ways that Betina Henriquez can increase her social activities  She has not yet reached out to the animal [facility] to inquire about volunteer opportunities  We discussed the pros/cons of volunteering  We also discussed tracking her emotional and physical symptoms-- and how pushing herself can have both positive and negative impacts on her mental and physical wellness  Betina Henriquez will continue to use DBT-informed skills to monitor her moods and increase her self-care  She will also increase her use of positive affirmations to build her self-confidence and ability to "love myself "  Client shows evidence of utilizing some mindfulness-based skills to manage mental health symptoms  During this session, this clinician used the following therapeutic modalities: supportive psychotherapy, client-centered therapy, mindfulness-based strategies, DBT-informed skills, Motivational Interviewing and solution-focused therapy  ASSESSMENT: Betina Henriquez presents with a normal mood  Her affect is normal range and intensity, appropriate  Betina Henriquez exhibits good therapeutic rapport with this clinician  Betina Henriquez continues to exhibit willingness to work on treatment goals and objectives  Betina Henriquez presents with a minimal risk of suicide, minimal risk of self-harm, and minimal risk of harm to others  PLAN: Betina Henriquez will return in approximately two weeks for the next scheduled session  Between sessions, Betina Henriquez will continue to work on developing her self-care routine and her self-affirmation practice  She will reach out to the animal shelter to explore opportunities  She will report back during the next session re: successes and barriers   At the next session, this clinician will use supportive psychotherapy, client-centered therapy, mindfulness-based strategies, DBT-informed skills, Motivational Interviewing and solution-focused therapy to address her mood regulation and relationship concerns, in an effort to assist Alvin Marquis with meeting treatment goals  HPI     Past Medical History:   Diagnosis Date   • Abnormal Pap smear of cervix    • Allergic    • Anxiety    • Arthritis    • Depression    • Diabetes mellitus ([address] )    • Fibromyalgia, primary    • Hypertension    • IBS (irritable bowel syndrome)    • Migraine    • Obesity    • Vitamin B12 deficiency        Past Surgical History:   Procedure Laterality Date   • COLONOSCOPY N/A 5/8/2018    Procedure: COLONOSCOPY;  Surgeon: Ken Serrato MD;  Location: [facility] GI LAB; Service: Gastroenterology   • SD EXC SKIN BENIG >4 CM REMAINDR BODY N/A 7/1/2021    Procedure: EXCISION OF PILAR SCALP CYST X 2 AND RIGHT INNER GROIN NEVVUS;  Surgeon: Josesito Swanson MD;  Location: [facility] MAIN OR;  Service: Plastics   • SD RECMPL WND SCALP,EXTR 2 6-7 5 CM N/A 7/1/2021    Procedure: CLOSURE WOUND SCALP X 2 AND RIGHT INNER GROIN;  Surgeon: Josesito Swanson MD;  Location: [facility] MAIN OR;  Service: Plastics   • TONSILLECTOMY     • WISDOM TOOTH EXTRACTION         Current Outpatient Medications   Medication Sig Dispense Refill   • Aimovig 140 MG/ML SOAJ Inject 140mg under the skin every 30 (thirty) days  1 mL 10   • ALPRAZolam ER (XANAX XR) 1 MG 24 hr tablet Take 1 tablet (1 mg total) by mouth every morning 30 tablet 0   • B-D 3CC LUER-DEANN SYR 25GX1" 25G X 1" 3 ML MISC   (Patient not taking: Reported on 11/8/2022)     • baclofen 10 mg tablet 1 tab TID prn facial pain, jaw pain  Hold flexeril   90 tablet 0   • buPROPion (WELLBUTRIN XL) 300 mg 24 hr tablet Take 1 tablet (300 mg total) by mouth daily 90 tablet 0   • dicyclomine (BENTYL) 20 mg tablet Take 1 tablet (20 mg total) by mouth 2 (two) times a day (Patient not taking: No sig reported) 60 tablet 1   • dihydroergotamine (MIGRANAL) 4 MG/ML nasal spray 1 spray into each nostril as needed for migraine Use in one nostril as directed  No more than 4 sprays in one hour 16 mL 0   • diphenoxylate-atropine (LOMOTIL) 2 5-0 025 mg per tablet 1 tablet 4 times daily as needed for diarrhea (Patient not taking: No sig reported) 30 tablet 1   • escitalopram (LEXAPRO) 20 mg tablet Take 1 tablet (20 mg total) by mouth daily 30 tablet 2   • etonogestrel-ethinyl estradiol (NuvaRing) 0 12-0 015 MG/24HR vaginal ring Insert ring for 21 days then remove for one week  3 each 4   • gabapentin (Neurontin) 100 mg capsule Take 1 capsule (100 mg total) by mouth 3 (three) times a day (Patient not taking: No sig reported) 30 capsule 0   • indomethacin (INDOCIN) 25 mg capsule 1-2 tabs BID prn severe headache with food  Hold toradol  30 capsule 0   • ketorolac (TORADOL) 10 mg tablet Take 1 tablet (10 mg total) by mouth every 6 (six) hours as needed (migraine) Max 2-3 per week  10 tablet 0   • magnesium Oxide (MAG-OX) 400 mg TABS Take 1 tablet (400 mg total) by mouth daily 30 tablet 0   • meclizine (ANTIVERT) 12 5 MG tablet Take 1 tablet (12 5 mg total) by mouth 3 (three) times a day as needed for dizziness 30 tablet 0   • metFORMIN (GLUCOPHAGE-XR) 500 mg 24 hr tablet Take 2 tablets (1,000 mg total) by mouth daily with breakfast (Patient not taking: No sig reported) 60 tablet 2   • nystatin (MYCOSTATIN) powder Apply topically 4 (four) times a day 60 g 5   • OLANZapine (ZyPREXA) 2 5 mg tablet Take 1 tablet (2 5 mg total) by mouth daily Do not take with reglan   30 tablet 2   • olmesartan (BENICAR) 20 mg tablet Take 1 tablet (20 mg total) by mouth daily (Patient not taking: Reported on 11/8/2022) 90 tablet 0   • onabotulinumtoxin A (BOTOX) 100 units Inject as directed     • ondansetron (ZOFRAN) 4 mg tablet Take 1 tablet (4 mg total) by mouth every 6 (six) hours 30 tablet 2   • pantoprazole (PROTONIX) 40 mg tablet Take 1 tablet (40 mg total) by mouth daily 30 tablet 3   • pramipexole (MIRAPEX) 0 25 mg tablet Take 1 tablet (0 25 mg total) by mouth daily at bedtime 90 tablet 1   • prazosin (MINIPRESS) 1 mg capsule Take 1 capsule (1 mg total) by mouth daily at bedtime 30 capsule 2   • Riboflavin 400 MG CAPS Take 1 capsule (400 mg total) by mouth in the morning (Patient not taking: Reported on 11/14/2022) 30 capsule 0   • Syringe/Needle, Disp, (SYRINGE 3CC/65UO7-9/4") 27G X 1-1/4" 3 ML MISC Use for IM injection of ketorolac  (Patient not taking: No sig reported) 4 each 0     Current Facility-Administered Medications   Medication Dose Route Frequency Provider Last Rate Last Admin   • cyanocobalamin injection 1,000 mcg  1,000 mcg Intramuscular Q30 Days Waynard Shark, DO   1,000 mcg at 08/03/20 3323   • cyanocobalamin injection 1,000 mcg  1,000 mcg Intramuscular Q14 Days Waynard Shark, DO   1,000 mcg at 05/18/20 1146   • cyanocobalamin injection 1,000 mcg  1,000 mcg Intramuscular Q30 Days Waynard Shark, DO   1,000 mcg at 09/01/20 1129        Allergies   Allergen Reactions   • Cimzia [Certolizumab Pegol] Swelling   • Desvenlafaxine      Other reaction(s): state of confusion   • Ixekizumab Vomiting   • Valproic Acid Other (See Comments)     Other reaction(s): dilated pupils, "schizi"   • Tizanidine Anxiety       Review of Systems    Video Exam    There were no vitals filed for this visit      Physical Exam     Visit Time    11/25/22  Start Time: 0802  Stop Time: 9330  Total Visit Time: 48 minutes

## 2022-12-02 ENCOUNTER — HOSPITAL ENCOUNTER (EMERGENCY)
Facility: HOSPITAL | Age: 32
Discharge: HOME | End: 2022-12-02
Attending: EMERGENCY MEDICINE

## 2022-12-02 ENCOUNTER — APPOINTMENT (EMERGENCY)
Dept: RADIOLOGY | Facility: HOSPITAL | Age: 32
End: 2022-12-02

## 2022-12-02 VITALS
SYSTOLIC BLOOD PRESSURE: 159 MMHG | DIASTOLIC BLOOD PRESSURE: 94 MMHG | BODY MASS INDEX: 45.17 KG/M2 | HEART RATE: 92 BPM | HEIGHT: 63 IN | TEMPERATURE: 97.9 F | OXYGEN SATURATION: 98 % | RESPIRATION RATE: 22 BRPM

## 2022-12-02 DIAGNOSIS — M94.0 COSTOCHONDRITIS: Primary | ICD-10-CM

## 2022-12-02 LAB
ALBUMIN SERPL BCP-MCNC: 2.9 G/DL (ref 3.5–5)
ALP SERPL-CCNC: 66 U/L (ref 46–116)
ALT SERPL W P-5'-P-CCNC: 18 U/L (ref 12–78)
ANION GAP SERPL CALCULATED.3IONS-SCNC: 7 MMOL/L (ref 4–13)
AST SERPL W P-5'-P-CCNC: 17 U/L (ref 5–45)
ATRIAL RATE: 81 BPM
BASOPHILS # BLD AUTO: 0.05 THOUSANDS/ÂΜL (ref 0–0.1)
BASOPHILS NFR BLD AUTO: 1 % (ref 0–1)
BILIRUB SERPL-MCNC: 0.28 MG/DL (ref 0.2–1)
BUN SERPL-MCNC: 7 MG/DL (ref 5–25)
CALCIUM ALBUM COR SERPL-MCNC: 10 MG/DL (ref 8.3–10.1)
CALCIUM SERPL-MCNC: 9.1 MG/DL (ref 8.3–10.1)
CARDIAC TROPONIN I PNL SERPL HS: <2 NG/L
CHLORIDE SERPL-SCNC: 110 MMOL/L (ref 96–108)
CO2 SERPL-SCNC: 20 MMOL/L (ref 21–32)
CREAT SERPL-MCNC: 0.66 MG/DL (ref 0.6–1.3)
EOSINOPHIL # BLD AUTO: 0.3 THOUSAND/ÂΜL (ref 0–0.61)
EOSINOPHIL NFR BLD AUTO: 3 % (ref 0–6)
ERYTHROCYTE [DISTWIDTH] IN BLOOD BY AUTOMATED COUNT: 13.9 % (ref 11.6–15.1)
GFR SERPL CREATININE-BSD FRML MDRD: 118 ML/MIN/1.73SQ M
GLUCOSE SERPL-MCNC: 86 MG/DL (ref 65–140)
HCT VFR BLD AUTO: 35.7 % (ref 34.8–46.1)
HGB BLD-MCNC: 11.4 G/DL (ref 11.5–15.4)
IMM GRANULOCYTES # BLD AUTO: 0.03 THOUSAND/UL (ref 0–0.2)
IMM GRANULOCYTES NFR BLD AUTO: 0 % (ref 0–2)
LYMPHOCYTES # BLD AUTO: 3.66 THOUSANDS/ÂΜL (ref 0.6–4.47)
LYMPHOCYTES NFR BLD AUTO: 39 % (ref 14–44)
MCH RBC QN AUTO: 27 PG (ref 26.8–34.3)
MCHC RBC AUTO-ENTMCNC: 31.9 G/DL (ref 31.4–37.4)
MCV RBC AUTO: 85 FL (ref 82–98)
MONOCYTES # BLD AUTO: 0.79 THOUSAND/ÂΜL (ref 0.17–1.22)
MONOCYTES NFR BLD AUTO: 9 % (ref 4–12)
NEUTROPHILS # BLD AUTO: 4.46 THOUSANDS/ÂΜL (ref 1.85–7.62)
NEUTS SEG NFR BLD AUTO: 48 % (ref 43–75)
NRBC BLD AUTO-RTO: 0 /100 WBCS
P AXIS: 39 DEGREES
PLATELET # BLD AUTO: 515 THOUSANDS/UL (ref 149–390)
PMV BLD AUTO: 8.9 FL (ref 8.9–12.7)
POTASSIUM SERPL-SCNC: 3.9 MMOL/L (ref 3.5–5.3)
PR INTERVAL: 166 MS
PROT SERPL-MCNC: 7.3 G/DL (ref 6.4–8.4)
QRS AXIS: 21 DEGREES
QRSD INTERVAL: 90 MS
QT INTERVAL: 378 MS
QTC INTERVAL: 439 MS
RBC # BLD AUTO: 4.22 MILLION/UL (ref 3.81–5.12)
SODIUM SERPL-SCNC: 137 MMOL/L (ref 135–147)
T WAVE AXIS: 25 DEGREES
VENTRICULAR RATE: 81 BPM
WBC # BLD AUTO: 9.29 THOUSAND/UL (ref 4.31–10.16)

## 2022-12-02 RX ORDER — KETOROLAC TROMETHAMINE 30 MG/ML
15 INJECTION, SOLUTION INTRAMUSCULAR; INTRAVENOUS ONCE
Status: COMPLETED | OUTPATIENT
Start: 2022-12-02 | End: 2022-12-02

## 2022-12-02 RX ORDER — ACETAMINOPHEN 325 MG/1
975 TABLET ORAL ONCE
Status: COMPLETED | OUTPATIENT
Start: 2022-12-02 | End: 2022-12-02

## 2022-12-02 RX ADMIN — ACETAMINOPHEN 975 MG: 325 TABLET ORAL at 17:56

## 2022-12-02 RX ADMIN — KETOROLAC TROMETHAMINE 15 MG: 30 INJECTION, SOLUTION INTRAMUSCULAR at 17:38

## 2022-12-02 NOTE — DISCHARGE INSTRUCTIONS
Follow up with your rheumatologist  Take ibuprofen and tylenol for pain  If you develop new or worsening symptoms, please return to the Emergency Department for further evaluation

## 2022-12-02 NOTE — ED PROVIDER NOTES
History  Chief Complaint   Patient presents with   • Shortness of Breath     Pt reports feeling SOB with exertion and a tightness in chest for the past 9 days  Worsened this morning  HPI     32 y/o F with PMH costochondritis, fibromyalgia presenting with chest discomfort  Patient has had over a week of anterior chest pain with associated sensation of not taking a satisfying breath  States she was worked up at prior ED which was negative, however she had a phone call with her rheumatologist today who was concerned by ongoing symptoms and wanted pt reevaluated  Pt denies fever, positional component, pleuritic pain, back or radiating pain, n/v/d  Prior to Admission Medications   Prescriptions Last Dose Informant Patient Reported? Taking? ALPRAZolam ER (XANAX XR) 1 MG 24 hr tablet   No No   Sig: Take 1 tablet (1 mg total) by mouth every morning   Aimovig 140 MG/ML SOAJ   No No   Sig: Inject 140mg under the skin every 30 (thirty) days  B-D 3CC LUER-DEANN SYR 25GX1" 25G X 1" 3 ML MISC   Yes No   Sig:     Patient not taking: Reported on 2022   OLANZapine (ZyPREXA) 2 5 mg tablet   No No   Sig: Take 1 tablet (2 5 mg total) by mouth daily Do not take with reglan  Riboflavin 400 MG CAPS   No No   Sig: Take 1 capsule (400 mg total) by mouth in the morning   Patient not taking: Reported on 2022   Syringe/Needle, Disp, (SYRINGE 3CC/29JR1-1/4") 27G X 1-1/4" 3 ML MISC   No No   Sig: Use for IM injection of ketorolac  Patient not taking: No sig reported   baclofen 10 mg tablet   No No   Si tab TID prn facial pain, jaw pain  Hold flexeril     buPROPion (WELLBUTRIN XL) 300 mg 24 hr tablet   No No   Sig: Take 1 tablet (300 mg total) by mouth daily   dicyclomine (BENTYL) 20 mg tablet   No No   Sig: Take 1 tablet (20 mg total) by mouth 2 (two) times a day   Patient not taking: No sig reported   dihydroergotamine (MIGRANAL) 4 MG/ML nasal spray   No No   Si spray into each nostril as needed for migraine Use in one nostril as directed  No more than 4 sprays in one hour   diphenoxylate-atropine (LOMOTIL) 2 5-0 025 mg per tablet   No No   Si tablet 4 times daily as needed for diarrhea   Patient not taking: No sig reported   escitalopram (LEXAPRO) 20 mg tablet   No No   Sig: Take 1 tablet (20 mg total) by mouth daily   etonogestrel-ethinyl estradiol (NuvaRing) 0 12-0 015 MG/24HR vaginal ring   No No   Sig: Insert ring for 21 days then remove for one week    gabapentin (Neurontin) 100 mg capsule   No No   Sig: Take 1 capsule (100 mg total) by mouth 3 (three) times a day   Patient not taking: No sig reported   indomethacin (INDOCIN) 25 mg capsule   No No   Si-2 tabs BID prn severe headache with food   Hold toradol    ketorolac (TORADOL) 10 mg tablet   No No   Sig: Take 1 tablet (10 mg total) by mouth every 6 (six) hours as needed (migraine) Max 2-3 per week    magnesium Oxide (MAG-OX) 400 mg TABS   No No   Sig: Take 1 tablet (400 mg total) by mouth daily   meclizine (ANTIVERT) 12 5 MG tablet   No No   Sig: Take 1 tablet (12 5 mg total) by mouth 3 (three) times a day as needed for dizziness   metFORMIN (GLUCOPHAGE-XR) 500 mg 24 hr tablet   No No   Sig: Take 2 tablets (1,000 mg total) by mouth daily with breakfast   Patient not taking: No sig reported   nystatin (MYCOSTATIN) powder   No No   Sig: Apply topically 4 (four) times a day   olmesartan (BENICAR) 20 mg tablet   No No   Sig: Take 1 tablet (20 mg total) by mouth daily   Patient not taking: Reported on 2022   onabotulinumtoxin A (BOTOX) 100 units   Yes No   Sig: Inject as directed   ondansetron (ZOFRAN) 4 mg tablet   No No   Sig: Take 1 tablet (4 mg total) by mouth every 6 (six) hours   pantoprazole (PROTONIX) 40 mg tablet   No No   Sig: Take 1 tablet (40 mg total) by mouth daily   pramipexole (MIRAPEX) 0 25 mg tablet   No No   Sig: Take 1 tablet (0 25 mg total) by mouth daily at bedtime   prazosin (MINIPRESS) 1 mg capsule   No No   Sig: Take 1 capsule (1 mg total) by mouth daily at bedtime      Facility-Administered Medications Last Administration Doses Remaining   cyanocobalamin injection 1,000 mcg 8/3/2020  8:59 AM    cyanocobalamin injection 1,000 mcg 5/18/2020 11:46 AM    cyanocobalamin injection 1,000 mcg 9/1/2020 11:29 AM           Past Medical History:   Diagnosis Date   • Abnormal Pap smear of cervix    • Allergic    • Anxiety    • Arthritis    • Depression    • Diabetes mellitus ([address] )    • Fibromyalgia, primary    • Hypertension    • IBS (irritable bowel syndrome)    • Migraine    • Obesity    • Vitamin B12 deficiency        Past Surgical History:   Procedure Laterality Date   • COLONOSCOPY N/A 5/8/2018    Procedure: COLONOSCOPY;  Surgeon: Hiren Foster MD;  Location: [facility] GI [facility]; Service: Gastroenterology   • ME EXC SKIN BENIG >4 CM REMAINDR BODY N/A 7/1/2021    Procedure: EXCISION OF PILAR SCALP CYST X 2 AND RIGHT INNER GROIN NEVVUS;  Surgeon: Gricelda Kaufman MD;  Location: [address] [place] [address] OR;  Service: Plastics   • ME RECMPL WND SCALP,EXTR 2 6-7 5 CM N/A 7/1/2021    Procedure: CLOSURE WOUND SCALP X 2 AND RIGHT INNER GROIN;  Surgeon: Gricelda Kaufman MD;  Location: [address] [place] [address] OR;  Service: Plastics   • TONSILLECTOMY     • WISDOM TOOTH EXTRACTION         Family History   Problem Relation Age of Onset   • Rheum arthritis Mother    • Psoriasis Mother    • Other Mother    • Hypertension Mother    • Diabetes unspecified Mother    • Sjogren's syndrome Mother    • Alcohol abuse Mother    • Drug abuse Mother    • Anxiety disorder Father    • Alcohol abuse Father    • Lung cancer Maternal Grandfather    • Cancer Other         bone   • Diabetes Other    • Other Other         High blood pressure   • Depression Maternal Grandmother      I have reviewed and agree with the history as documented      E-Cigarette/Vaping   • E-Cigarette Use Current Some Day User    • Start Date 7/1/19    • Comments medical marijuana      E-Cigarette/Vaping Substances   • Nicotine No    • THC Yes • CBD Yes    • Flavoring No    • Other No    • Unknown No      Social History     Tobacco Use   • Smoking status: Never   • Smokeless tobacco: Never   Vaping Use   • Vaping Use: Some days   • Start date: 7/1/2019   • Substances: THC, CBD   Substance Use Topics   • Alcohol use: Yes     Comment: rarely   • Drug use: Yes     Frequency: 2 0 times per week     Types: Marijuana     Comment: medical marijuana (vape)        Review of Systems   Constitutional: Negative for chills and fever  HENT: Negative for ear pain and sore throat  Eyes: Negative for pain and visual disturbance  Respiratory: Positive for shortness of breath  Negative for cough  Cardiovascular: Positive for chest pain  Negative for palpitations  Gastrointestinal: Negative for abdominal pain and vomiting  Genitourinary: Negative for dysuria and hematuria  Musculoskeletal: Negative for arthralgias and back pain  Skin: Negative for color change and rash  Neurological: Negative for seizures and syncope  All other systems reviewed and are negative  Physical Exam  ED Triage Vitals [12/02/22 1621]   Temperature Pulse Respirations Blood Pressure SpO2   97 9 °F (36 6 °C) 92 22 159/94 98 %      Temp Source Heart Rate Source Patient Position - Orthostatic VS BP Location FiO2 (%)   Oral Monitor Sitting Left arm --      Pain Score       6             Orthostatic Vital Signs  Vitals:    12/02/22 1621   BP: 159/94   Pulse: 92   Patient Position - Orthostatic VS: Sitting       Physical Exam  Vitals and nursing note reviewed  Constitutional:       General: She is not in acute distress  HENT:      Head: Normocephalic and atraumatic  Right Ear: External ear normal       Left Ear: External ear normal       Nose: Nose normal       Mouth/Throat:      Pharynx: Oropharynx is clear  Eyes:      Extraocular Movements: Extraocular movements intact  Pupils: Pupils are equal, round, and reactive to light     Cardiovascular:      Rate and Rhythm: Normal rate and regular rhythm  Pulses: Normal pulses  Heart sounds: Normal heart sounds  No murmur heard  No friction rub  No gallop  Pulmonary:      Effort: Pulmonary effort is normal  No respiratory distress  Breath sounds: Normal breath sounds  No decreased breath sounds, wheezing, rhonchi or rales  Chest:      Chest wall: Tenderness present  Abdominal:      General: Abdomen is flat  There is no distension  Palpations: Abdomen is soft  Tenderness: There is no abdominal tenderness  There is no guarding or rebound  Musculoskeletal:         General: No deformity  Normal range of motion  Cervical back: Normal range of motion  Right lower leg: No edema  Left lower leg: No edema  Skin:     General: Skin is warm and dry  Capillary Refill: Capillary refill takes less than 2 seconds  Findings: No rash  Neurological:      General: No focal deficit present  Mental Status: She is alert and oriented to person, place, and time        Gait: Gait normal    Psychiatric:         Mood and Affect: Mood normal          ED Medications  Medications   ketorolac (TORADOL) injection 15 mg (15 mg Intravenous Given 12/2/22 1738)   acetaminophen (TYLENOL) tablet 975 mg (975 mg Oral Given 12/2/22 1756)       Diagnostic Studies  Results Reviewed     Procedure Component Value Units Date/Time    Comprehensive metabolic panel [[id number]]  (Abnormal) Collected: 12/02/22 1737    Lab Status: Final result Specimen: Blood from Arm, Left Updated: 12/02/22 1820     Sodium 137 mmol/L      Potassium 3 9 mmol/L      Chloride 110 mmol/L      CO2 20 mmol/L      ANION GAP 7 mmol/L      BUN 7 mg/dL      Creatinine 0 66 mg/dL      Glucose 86 mg/dL      Calcium 9 1 mg/dL      Corrected Calcium 10 0 mg/dL      AST 17 U/L      ALT 18 U/L      Alkaline Phosphatase 66 U/L      Total Protein 7 3 g/dL      Albumin 2 9 g/dL      Total Bilirubin 0 28 mg/dL      eGFR 118 ml/min/1 73sq m Narrative:      National Kidney Disease Foundation guidelines for Chronic Kidney Disease (CKD):   •  Stage 1 with normal or high GFR (GFR > 90 mL/min/1 73 square meters)  •  Stage 2 Mild CKD (GFR = 60-89 mL/min/1 73 square meters)  •  Stage 3A Moderate CKD (GFR = 45-59 mL/min/1 73 square meters)  •  Stage 3B Moderate CKD (GFR = 30-44 mL/min/1 73 square meters)  •  Stage 4 Severe CKD (GFR = 15-29 mL/min/1 73 square meters)  •  Stage 5 End Stage CKD (GFR <15 mL/min/1 73 square meters)  Note: GFR calculation is accurate only with a steady state creatinine    HS Troponin 0hr (reflex protocol) [[id number]]  (Normal) Collected: 12/02/22 1737    Lab Status: Final result Specimen: Blood from Arm, Left Updated: 12/02/22 1816     hs TnI 0hr <2 ng/L     CBC and differential [[id number]]  (Abnormal) Collected: 12/02/22 1737    Lab Status: Final result Specimen: Blood from Arm, Left Updated: 12/02/22 1746     WBC 9 29 Thousand/uL      RBC 4 22 Million/uL      Hemoglobin 11 4 g/dL      Hematocrit 35 7 %      MCV 85 fL      MCH 27 0 pg      MCHC 31 9 g/dL      RDW 13 9 %      MPV 8 9 fL      Platelets 075 Thousands/uL      nRBC 0 /100 WBCs      Neutrophils Relative 48 %      Immat GRANS % 0 %      Lymphocytes Relative 39 %      Monocytes Relative 9 %      Eosinophils Relative 3 %      Basophils Relative 1 %      Neutrophils Absolute 4 46 Thousands/µL      Immature Grans Absolute 0 03 Thousand/uL      Lymphocytes Absolute 3 66 Thousands/µL      Monocytes Absolute 0 79 Thousand/µL      Eosinophils Absolute 0 30 Thousand/µL      Basophils Absolute 0 05 Thousands/µL                  XR chest 1 view portable   ED Interpretation by Arabella Colon MD (12/02 6825)   No acute cardiopulmonary disease      Final Result by Luisana Morrow MD (12/03 5515)      No acute cardiopulmonary disease                    Workstation performed: [id number]               Procedures  Procedures      ED Course  ED Course as of 12/03/22 1440   Fri Dec 02, 2022 1741 ECG 12 lead  Procedure Note: EKG  Date/Time: 12/02/22 5:41 PM   Interpreted by: Shilpi Govea MD  Indications / Diagnosis: CP  ECG reviewed by me, the ED Provider: yes   The EKG demonstrates:  Rhythm: normal sinus  Intervals: normal intervals  Axis: normal axis  QRS/Blocks: normal QRS  ST Changes: No acute ST Changes, no STD/ANTHONY  SBIRT 22yo+    Flowsheet Row Most Recent Value   SBIRT (23 yo +)    In order to provide better care to our patients, we are screening all of our patients for alcohol and drug use  Would it be okay to ask you these screening questions? No Filed at: 12/02/2022 1750                MDM  Number of Diagnoses or Management Options  Costochondritis  Diagnosis management comments: 32 y/o F presenting with ongoing chest discomfort and shortness of breath  VSS  Exam with chest wall tenderness otherwise benign  Negative prior ED w/u including CT PE study  Will re-eval with cardiac w/u  EKG, CXR, labs all WNL  Provided reassurance  Recommend course of NSAIDs and outpatient follow up  Pt agreeable, return precautions discussed  Disposition  Final diagnoses:   Costochondritis     Time reflects when diagnosis was documented in both MDM as applicable and the Disposition within this note     Time User Action Codes Description Comment    12/2/2022  6:32 PM Fran Coelho Add [M94 0] Costochondritis       ED Disposition     ED Disposition   Discharge    Condition   Stable    Date/Time   Fri Dec 2, 2022  6:32 PM    Comment   Joey Nix discharge to home/self care  Follow-up Information     Follow up With Specialties Details Why [address] ,  Family Medicine   [address] [place] [address]  [phone]            Discharge Medication List as of 12/2/2022  6:33 PM      CONTINUE these medications which have NOT CHANGED    Details   Aimovig 140 MG/ML SOAJ Inject 140mg under the skin every 30 (thirty) days  , Normal      ALPRAZolam ER (XANAX XR) 1 MG 24 hr tablet Take 1 tablet (1 mg total) by mouth every morning, Starting Tue 11/8/2022, Normal      !! B-D 3CC LUER-DEANN SYR 25GX1" 25G X 1" 3 ML MISC  , Starting Thu 7/26/2018, Historical Med      baclofen 10 mg tablet 1 tab TID prn facial pain, jaw pain  Hold flexeril , Normal      buPROPion (WELLBUTRIN XL) 300 mg 24 hr tablet Take 1 tablet (300 mg total) by mouth daily, Starting Thu 9/22/2022, Normal      dicyclomine (BENTYL) 20 mg tablet Take 1 tablet (20 mg total) by mouth 2 (two) times a day, Starting Fri 5/27/2022, Normal      dihydroergotamine (MIGRANAL) 4 MG/ML nasal spray 1 spray into each nostril as needed for migraine Use in one nostril as directed  No more than 4 sprays in one hour, Starting Wed 8/31/2022, Normal      diphenoxylate-atropine (LOMOTIL) 2 5-0 025 mg per tablet 1 tablet 4 times daily as needed for diarrhea, Normal      escitalopram (LEXAPRO) 20 mg tablet Take 1 tablet (20 mg total) by mouth daily, Starting Thu 9/22/2022, Normal      etonogestrel-ethinyl estradiol (NuvaRing) 0 12-0 015 MG/24HR vaginal ring Insert ring for 21 days then remove for one week , Normal      gabapentin (Neurontin) 100 mg capsule Take 1 capsule (100 mg total) by mouth 3 (three) times a day, Starting Wed 9/28/2022, Normal      indomethacin (INDOCIN) 25 mg capsule 1-2 tabs BID prn severe headache with food   Hold toradol , Normal      ketorolac (TORADOL) 10 mg tablet Take 1 tablet (10 mg total) by mouth every 6 (six) hours as needed (migraine) Max 2-3 per week , Starting Fri 9/30/2022, Normal      magnesium Oxide (MAG-OX) 400 mg TABS Take 1 tablet (400 mg total) by mouth daily, Starting Fri 10/28/2022, Until Sun 11/27/2022, Normal      meclizine (ANTIVERT) 12 5 MG tablet Take 1 tablet (12 5 mg total) by mouth 3 (three) times a day as needed for dizziness, Starting Wed 8/3/2022, Normal      metFORMIN (GLUCOPHAGE-XR) 500 mg 24 hr tablet Take 2 tablets (1,000 mg total) by mouth daily with breakfast, Starting Tue 5/10/2022, Normal      nystatin (MYCOSTATIN) powder Apply topically 4 (four) times a day, Starting Fri 11/11/2022, Normal      OLANZapine (ZyPREXA) 2 5 mg tablet Take 1 tablet (2 5 mg total) by mouth daily Do not take with reglan , Starting Thu 9/22/2022, Normal      olmesartan (BENICAR) 20 mg tablet Take 1 tablet (20 mg total) by mouth daily, Starting Wed 9/28/2022, Normal      onabotulinumtoxin A (BOTOX) 100 units Inject as directed, Starting Tue 8/29/2017, Historical Med      ondansetron (ZOFRAN) 4 mg tablet Take 1 tablet (4 mg total) by mouth every 6 (six) hours, Starting Fri 5/27/2022, Normal      pantoprazole (PROTONIX) 40 mg tablet Take 1 tablet (40 mg total) by mouth daily, Starting Fri 5/27/2022, Normal      pramipexole (MIRAPEX) 0 25 mg tablet Take 1 tablet (0 25 mg total) by mouth daily at bedtime, Starting Fri 11/11/2022, Normal      prazosin (MINIPRESS) 1 mg capsule Take 1 capsule (1 mg total) by mouth daily at bedtime, Starting Thu 9/22/2022, Normal      Riboflavin 400 MG CAPS Take 1 capsule (400 mg total) by mouth in the morning, Starting Sun 8/7/2022, Until Mon 11/14/2022, Normal      !! Syringe/Needle, Disp, (SYRINGE 3CC/33TZ3-0/4") 27G X 1-1/4" 3 ML MISC Use for IM injection of ketorolac , Normal       !! - Potential duplicate medications found  Please discuss with provider  No discharge procedures on file  PDMP Review       Value Time User    PDMP Reviewed  Yes 11/8/2022  1:22 PM Tamanna Crespo MD           ED Provider  Attending physically available and evaluated [facility] managed the patient along with the ED Attending      Electronically Signed by         Jen Griffin MD  12/03/22 2579

## 2022-12-02 NOTE — ED ATTENDING ATTESTATION
12/2/2022  I, Teresa Ramirez MD, saw and evaluated the patient  I have discussed the patient with the resident/non-physician practitioner and agree with the resident's/non-physician practitioner's findings, Plan of Care, and MDM as documented in the resident's/non-physician practitioner's note, except where noted  All available labs and Radiology studies were reviewed  I was present for key portions of any procedure(s) performed by the resident/non-physician practitioner and I was immediately available to provide assistance  At this point I agree with the current assessment done in the Emergency Department    I have conducted an independent evaluation of this patient a history and physical is as follows:  Sob and cp   Tight to breath for the last 8-9 days  Seen in another ED recently 11/25 had negative cardiac workup      No history of dvt or PE  Cardiac risk   htn elevated BMI no elevated cholesterol no family history of early cardiac disease nonsmoker  Past medical history ankylosing spondylitis not on medications    No fever no cough      Pain is not positional   No relief or precipitating factors   EXAM:   Const:   well appearing   NAD     HEENT:  NCAT    sclera anicteric conjunctiva pink   throat clear, MMM    Neck:   supple  no meningismus  no jvd   no bruits  no  midline tenderness   Lungs:   clear  CW tender costosternal junction  No creiptation  Heart:   RRR no m/g/r  Normal pulses  Abd:   soft nt nd pos bs   Ext:    normal nontender  No edema  Neruo:   CN 2 -12 intact  motor intact 5/5 sensory intact cerebellar intact       Gait normal    IMPRESSION:  Costochondritis  PLAN:  EKG troponin pain control       ED Course         Critical Care Time  Procedures

## 2022-12-09 ENCOUNTER — TELEMEDICINE (OUTPATIENT)
Dept: BEHAVIORAL/MENTAL HEALTH CLINIC | Facility: CLINIC | Age: 32
End: 2022-12-09

## 2022-12-09 DIAGNOSIS — F41.1 GENERALIZED ANXIETY DISORDER: Chronic | ICD-10-CM

## 2022-12-09 DIAGNOSIS — F33.1 DEPRESSION, MAJOR, RECURRENT, MODERATE (HCC): Primary | Chronic | ICD-10-CM

## 2022-12-09 NOTE — BH TREATMENT PLAN
Jose Chacon  1990       Date of Initial Treatment Plan: June 26, 2019   Date of Current Treatment Plan: 12/9/2022     Treatment Plan Number 9     Strengths/Personal Resources for Self Care: Support from friends and family; Good therapeutic rapport; Motivation to learn new skills      Diagnosis:   1  Depression, major, recurrent, moderate ([address] )      2  Generalized anxiety disorder         Area of Needs: Anxiety and depression; physical health issues      Long Term Goal 1: "I want to be able to maintain my mental health on a daily basis  I want to come back to you on a Friday and tell you that I've had a really great week  I want to be able to manage things as they happen "     Target Date:           May 8, 2023                                    Treatment Plan Expiration Date:          June 7, 2023   Completion Date: To be determined         Short Term Objectives for Goal 1:             6  Cruz will identify triggers and prompting events that increase symptoms of anxiety and depression    Update 12/9/2022: Vera Tinoco continues to exhibit the ability to identify some of the triggers and prompting events that impact her mood dysregulation  During the past six months, Vera Tinoco has identified her health as the most impactful trigger for her  She also identifies that, currently, the holidays are a big trigger for her  "They are just not the same as they used to be for me " She states that she struggles to find ciara in the holidays  We will continue to identify stressors, as they arise  We will adjust therapeutic interventions, as needed, to help Stephanie manage her emotions and relationships               4  Cruz will learn and exhibit understanding of a minimum of three distress tolerance skills and emotion regulation skills to assist with symptom reduction  Update 12/9/2022: Vera Tinoco exhibits the understanding of the use of distress tolerance skills   She states that her use of medicinal MJ is a tool that is successful in managing her moods and emotional dysregulation  She states that she is working on distracting herself by getting involved in activities around her home  She continues to practice some basic mindfulness-based skills  Recently, she has been organizing her room, which makes her feel better about the space she in which she is living      Clara Kyrgyz  Oneita Nap will increase her daily physical activity to a duration of 15-30 minutes (including walking, swimming, stretching, etc )    Update 12/9/2022: Konstantin Bobby continues to struggle with daily exercise  She states that her physical health and "flares" have been impacting her ability to engage in activity  She states that she wants to do more-- e g , "hoop"-- but feels that she is physically unable to do so currently, due to being in a flare  She states that she is moving (mostly chair work)  She states that her grandmother is encouraging her to go to her home and do her PT exercises with her  She states that she will continue to do some physical movement on a daily basis and will build up to the above-mentioned goal               4  Oneita Nap will increase her social interactions (outside of her home) to a minimum of 3x per week  Update 12/9/2022: Konstantin Bobby states that she has been going to talk to Lizz Masters  "I went up three times during this week " She is communicating with other friends but has not gone to visit them recently  She is spending some additional time with her grandmother (about an hour per day)  She states that "it really feels good to be able to walk over there "              5  Oneita Nap will learn and utilize formal mindfulness-based strategies a minimum of 10 minutes every day  Update 12/9/2022: Konstantin Bobby states, "I think I need to work on it a little more " She states that her lava lamp broke   She will work on either fixing or replacing her lava lamp, as this is a tool that she uses in her mindfulness practice              6  Cruz will maintain a level of anxiety that does not surpass a 4/10 on most days  Incidents that surpass this limit will be process in therapy sessions  Update 12/9/2022: Stephanie completed the PHQ-9 (score of 14, down from 16) and the BESSY-7 (score of 12, up from 8)  She acknowledges that her physical health, as well as the holiday seasons, impact her moods               7  Jim Baumann will maintain adherence with medication management sessions and her medication regimen  Update 12/9/2022: Jim Baumann continues to work with Dr Chanel Tomlin  She states that within the past two weeks, she missed her medications 1-2 times  She states that the barrier to taking her medications was her physical pain  We will continue to discuss some problem-solving techniques, to help her to decrease the number of missed doses when she is not feeling well  8  "I don't want to be mad and regretful about the relationship with Julisa Howard-- and not getting out when I wanted to " We will work on processing her emotions related to the end of this relationship and guilt/shame related to this relationship  Update 12/9/2022: "I don't feel as made  I'm still in that regretful stage, though " She states that she would like to continue to work on processing her emotions related to her guilt/shame re: staying in this relationship  5  Jim Baumann will apply for [employer] assistance to explore possible educational and/or vocational opportunities  New objective 12/9/2022     GOAL 1: Modality: Individual 1-2x per month   Completion Date to be determined, Medication Management and The person(s) responsible for carrying out the plan is  Stephanie (client);  Jasmeet Hamilton (clinician); Dr Primitivo Lenz (psychiatrist)     Clinician will use client-centered therapy, mindfulness-based strategies, DBT-informed skills and solution-focused therapy to address Cruz's symptoms of anxiety  Gregbruna will practice skills between sessions and will report back, during subsequent sessions regarding successes and barriers          [facility]: Diagnosis and Treatment Plan explained to Cruz, Cruz relates understanding diagnosis and is agreeable to Treatment Plan          Client Comments : Please share your thoughts, feelings, need and/or experiences regarding your treatment plan: "I feel pretty good about it "     Bandar Martinez, 1990, actively participated in the review and update of this treatment plan during a virtual session, using the [employer] [employer]     Bandar Martinez  provided verbal consent on 12/9/2022 at 9:47 AM  The treatment plan was transcribed into the [employer] [facility] at a later time

## 2022-12-09 NOTE — PSYCH
Virtual Regular Visit    Verification of patient location:    Patient is located in the following state in which I hold an active license [place]    Assessment/Plan:    Problem List Items Addressed This Visit        Other    Generalized anxiety disorder (Chronic)    Depression, major, recurrent, moderate ([facility] [address] ) - Primary (Chronic)     Goals addressed in session: Goal 1      Reason for visit is   Chief Complaint   Patient presents with   • Virtual Regular Visit      Encounter provider APARNA Pedersen    Provider located at [address] [place] [id number] [phone]    Recent Visits  No visits were found meeting these conditions  Showing recent visits within past 7 days and meeting all other requirements  Today's Visits  Date Type Provider Dept   12/09/22 [address] today's visits and meeting all other requirements  Future Appointments  No visits were found meeting these conditions  Showing future appointments within next 150 days and meeting all other requirements     The patient was identified by name and date of birth  Bertha Johnson was informed that this is a telemedicine visit and [facility]  She agrees to proceed     My office door was closed  No one else was in the room  She acknowledged consent and understanding of privacy and security of the video platform  The patient has agreed to participate and understands they can discontinue the visit at any time  Patient is aware this is a billable service  Vee Rod is a 33 y o  female  DATA: Met with Stephanie for scheduled individual session   Topics of discussion included relationships with family, education-related stress, physical health concerns, relationships with friends, mood regulation and symptoms and treatment plan review  Karrie Nissen "I'm in a flare with my chest and my ribs " She states that she went to the ER (at the direction of her rheumatologist) to ensure that there were no cardiac concerns  She states that she feels that she is doing "pretty good mentally-wise " She states that she feels less depressed  She states that she went to [facility] for the first time she she and Esperanza Sheikh broke up  She states that she was surprised that she was triggered to think about him when she was there  She states that she does not miss him  "I think I miss someone being there " We spent some time discussing working on finding the right person to be there  She states that her friend, Narayan Middleton, told her that she should become a therapist  We talked about the possibility of her returning to school and what the pros/cons might be  She expressed significant anxiety at the thought of returning to school  We discussed an application to [employer]  She is willing to accept this referral  I will put in a referral to our internal  to help her make this application  We spent some time reviewing and revising Stephanie's treatment plan  She states that she wants to continue to work on managing her moods between sessions  She will also accept the referral to [employer], to determine if she can return to school  Client shows evidence of utilizing Mindfulness-based strategies and emotion regulation skills skills to manage mental health symptoms  During this session, this clinician used the following therapeutic modalities: supportive psychotherapy, client-centered therapy, mindfulness-based strategies, DBT-informed skills, Motivational Interviewing and solution-focused therapy  ASSESSMENT: Nae Barr presents with a normal mood  Her affect is normal range and intensity, appropriate  Nae Barr exhibits good therapeutic rapport with this clinician  Nae Barr continues to exhibit willingness to work on treatment goals and objectives   Nae Barr presents with a minimal risk of suicide, minimal risk of self-harm, and minimal risk of harm to others  PLAN: Paulo Augustin will return in approximately two weeks for the next scheduled session  Between sessions, Paulo Augustin will continue to practice her mindfulness-based skills and will report back during the next session re: successes and barriers  At the next session, this clinician will use supportive psychotherapy, client-centered therapy, mindfulness-based strategies, DBT-informed skills, Motivational Interviewing and solution-focused therapy to address her mood regulation and relationship concerns, in an effort to assist Paulo Augustin with meeting treatment goals  HPI     Past Medical History:   Diagnosis Date   • Abnormal Pap smear of cervix    • Allergic    • Anxiety    • Arthritis    • Depression    • Diabetes mellitus ([address] )    • Fibromyalgia, primary    • Hypertension    • IBS (irritable bowel syndrome)    • Migraine    • Obesity    • Vitamin B12 deficiency        Past Surgical History:   Procedure Laterality Date   • COLONOSCOPY N/A 5/8/2018    Procedure: COLONOSCOPY;  Surgeon: Pearl Duverney, MD;  Location: [facility]; Service: Gastroenterology   • AK EXC SKIN BENIG >4 CM REMAINDR BODY N/A 7/1/2021    Procedure: EXCISION OF PILAR SCALP CYST X 2 AND RIGHT INNER GROIN NEVVUS;  Surgeon: Iraida Wilcox MD;  Location: [address] [place] [address];  Service: Plastics   • AK RECMPL WND SCALP,EXTR 2 6-7 5 CM N/A 7/1/2021    Procedure: CLOSURE WOUND SCALP X 2 AND RIGHT INNER GROIN;  Surgeon: Iraida Wilcox MD;  Location: [address] [place] [address];  Service: Plastics   • TONSILLECTOMY     • WISDOM TOOTH EXTRACTION         Current Outpatient Medications   Medication Sig Dispense Refill   • Aimovig 140 MG/ML SOAJ Inject 140mg under the skin every 30 (thirty) days   1 mL 10   • ALPRAZolam ER (XANAX XR) 1 MG 24 hr tablet Take 1 tablet (1 mg total) by mouth every morning 30 tablet 0   • B-D 3CC LUER-DEANN SYR 25GX1" 25G X 1" 3 ML MISC   (Patient not taking: Reported on 11/8/2022) • baclofen 10 mg tablet 1 tab TID prn facial pain, jaw pain  Hold flexeril  90 tablet 0   • buPROPion (WELLBUTRIN XL) 300 mg 24 hr tablet Take 1 tablet (300 mg total) by mouth daily 90 tablet 0   • dicyclomine (BENTYL) 20 mg tablet Take 1 tablet (20 mg total) by mouth 2 (two) times a day (Patient not taking: No sig reported) 60 tablet 1   • dihydroergotamine (MIGRANAL) 4 MG/ML nasal spray 1 spray into each nostril as needed for migraine Use in one nostril as directed  No more than 4 sprays in one hour 16 mL 0   • diphenoxylate-atropine (LOMOTIL) 2 5-0 025 mg per tablet 1 tablet 4 times daily as needed for diarrhea (Patient not taking: No sig reported) 30 tablet 1   • escitalopram (LEXAPRO) 20 mg tablet Take 1 tablet (20 mg total) by mouth daily 30 tablet 2   • etonogestrel-ethinyl estradiol (NuvaRing) 0 12-0 015 MG/24HR vaginal ring Insert ring for 21 days then remove for one week  3 each 4   • gabapentin (Neurontin) 100 mg capsule Take 1 capsule (100 mg total) by mouth 3 (three) times a day (Patient not taking: No sig reported) 30 capsule 0   • indomethacin (INDOCIN) 25 mg capsule 1-2 tabs BID prn severe headache with food  Hold toradol  30 capsule 0   • ketorolac (TORADOL) 10 mg tablet Take 1 tablet (10 mg total) by mouth every 6 (six) hours as needed (migraine) Max 2-3 per week  10 tablet 0   • magnesium Oxide (MAG-OX) 400 mg TABS Take 1 tablet (400 mg total) by mouth daily 30 tablet 0   • meclizine (ANTIVERT) 12 5 MG tablet Take 1 tablet (12 5 mg total) by mouth 3 (three) times a day as needed for dizziness 30 tablet 0   • metFORMIN (GLUCOPHAGE-XR) 500 mg 24 hr tablet Take 2 tablets (1,000 mg total) by mouth daily with breakfast (Patient not taking: No sig reported) 60 tablet 2   • nystatin (MYCOSTATIN) powder Apply topically 4 (four) times a day 60 g 5   • OLANZapine (ZyPREXA) 2 5 mg tablet Take 1 tablet (2 5 mg total) by mouth daily Do not take with reglan   30 tablet 2   • olmesartan (BENICAR) 20 mg tablet Take 1 tablet (20 mg total) by mouth daily (Patient not taking: Reported on 11/8/2022) 90 tablet 0   • onabotulinumtoxin A (BOTOX) 100 units Inject as directed     • ondansetron (ZOFRAN) 4 mg tablet Take 1 tablet (4 mg total) by mouth every 6 (six) hours 30 tablet 2   • pantoprazole (PROTONIX) 40 mg tablet Take 1 tablet (40 mg total) by mouth daily 30 tablet 3   • pramipexole (MIRAPEX) 0 25 mg tablet Take 1 tablet (0 25 mg total) by mouth daily at bedtime 90 tablet 1   • prazosin (MINIPRESS) 1 mg capsule Take 1 capsule (1 mg total) by mouth daily at bedtime 30 capsule 2   • Riboflavin 400 MG CAPS Take 1 capsule (400 mg total) by mouth in the morning (Patient not taking: Reported on 11/14/2022) 30 capsule 0   • Syringe/Needle, Disp, (SYRINGE 3CC/65ZG5-6/4") 27G X 1-1/4" 3 ML MISC Use for IM injection of ketorolac  (Patient not taking: No sig reported) 4 each 0     Current Facility-Administered Medications   Medication Dose Route Frequency Provider Last Rate Last Admin   • cyanocobalamin injection 1,000 mcg  1,000 mcg Intramuscular Q30 Days Ros Peaches, DO   1,000 mcg at 08/03/20 5565   • cyanocobalamin injection 1,000 mcg  1,000 mcg Intramuscular Q14 Days Ros Peaches, DO   1,000 mcg at 05/18/20 1146   • cyanocobalamin injection 1,000 mcg  1,000 mcg Intramuscular Q30 Days Ros Peaches, DO   1,000 mcg at 09/01/20 1129        Allergies   Allergen Reactions   • Cimzia [Certolizumab Pegol] Swelling   • Desvenlafaxine      Other reaction(s): state of confusion   • Ixekizumab Vomiting   • Valproic Acid Other (See Comments)     Other reaction(s): dilated pupils, "schizi"   • Tizanidine Anxiety       Review of Systems    Video Exam    There were no vitals filed for this visit      Physical Exam     Visit Time    12/09/22  Start Time: 0902  Stop Time: 5244  Total Visit Time: 51 minutes

## 2022-12-12 ENCOUNTER — OFFICE VISIT (OUTPATIENT)
Dept: FAMILY MEDICINE CLINIC | Facility: CLINIC | Age: 32
End: 2022-12-12

## 2022-12-12 VITALS
HEIGHT: 63 IN | HEART RATE: 111 BPM | WEIGHT: 256 LBS | TEMPERATURE: 98.2 F | DIASTOLIC BLOOD PRESSURE: 80 MMHG | SYSTOLIC BLOOD PRESSURE: 128 MMHG | BODY MASS INDEX: 45.36 KG/M2 | OXYGEN SATURATION: 99 %

## 2022-12-12 DIAGNOSIS — M54.6 BACK PAIN OF THORACOLUMBAR REGION: ICD-10-CM

## 2022-12-12 DIAGNOSIS — M54.50 BACK PAIN OF THORACOLUMBAR REGION: ICD-10-CM

## 2022-12-12 DIAGNOSIS — M94.0 COSTOCHONDRITIS: Primary | ICD-10-CM

## 2022-12-12 RX ORDER — DICLOFENAC SODIUM 75 MG/1
75 TABLET, DELAYED RELEASE ORAL 2 TIMES DAILY
Qty: 60 TABLET | Refills: 0 | Status: SHIPPED | OUTPATIENT
Start: 2022-12-12 | End: 2022-12-14

## 2022-12-12 RX ORDER — PREDNISONE 20 MG/1
40 TABLET ORAL DAILY
Qty: 10 TABLET | Refills: 0 | Status: SHIPPED | OUTPATIENT
Start: 2022-12-12 | End: 2022-12-17

## 2022-12-12 RX ORDER — VERAPAMIL HYDROCHLORIDE 40 MG/1
TABLET ORAL
COMMUNITY
Start: 2022-11-23

## 2022-12-12 NOTE — PROGRESS NOTES
Assessment/Plan:       Diagnoses and all orders for this visit:    Costochondritis  -     predniSONE 20 mg tablet; Take 2 tablets (40 mg total) by mouth daily for 5 days  -     diclofenac (VOLTAREN) 75 mg EC tablet; Take 1 tablet (75 mg total) by mouth 2 (two) times a day    Back pain of thoracolumbar region  -     Ambulatory Referral to Chiropractic; Future    Other orders  -     verapamil (CALAN) 40 mg tablet            Subjective:        Patient ID: Isabela Villegas is a 33 y o  female  Patient is here with back pain as well as some chest pain  Patient with flare  Patient has been Tylenol and ibuprofen  The following portions of the patient's history were reviewed and updated as appropriate: allergies, current medications, past family history, past medical history, past social history, past surgical history and problem list       Review of Systems   Constitutional: Negative  HENT: Negative  Eyes: Negative  Respiratory: Negative  Cardiovascular: Positive for chest pain  Gastrointestinal: Negative  Endocrine: Negative  Genitourinary: Negative  Musculoskeletal: Positive for arthralgias and back pain  Skin: Negative  Allergic/Immunologic: Negative  Neurological: Negative  Hematological: Negative  Psychiatric/Behavioral: Negative  Objective:               /80 (BP Location: Right arm, Patient Position: Sitting, Cuff Size: Large)   Pulse (!) 111   Temp 98 2 °F (36 8 °C) (Temporal)   Ht 5' 3" (1 6 m)   Wt 116 kg (256 lb)   SpO2 99%   BMI 45 35 kg/m²          Physical Exam  Vitals and nursing note reviewed  Constitutional:       General: She is not in acute distress  Appearance: Normal appearance  She is not ill-appearing, toxic-appearing or diaphoretic  HENT:      Head: Normocephalic and atraumatic  Right Ear: Tympanic membrane, ear canal and external ear normal  There is no impacted cerumen        Left Ear: Tympanic membrane, ear canal and external ear normal  There is no impacted cerumen  Nose: Nose normal  No congestion or rhinorrhea  Mouth/Throat:      Mouth: Mucous membranes are moist       Pharynx: No oropharyngeal exudate or posterior oropharyngeal erythema  Eyes:      General: No scleral icterus  Right eye: No discharge  Left eye: No discharge  Extraocular Movements: Extraocular movements intact  Conjunctiva/sclera: Conjunctivae normal       Pupils: Pupils are equal, round, and reactive to light  Neck:      Vascular: No carotid bruit  Cardiovascular:      Rate and Rhythm: Normal rate and regular rhythm  Pulses: Normal pulses  Heart sounds: Normal heart sounds  No murmur heard  No friction rub  No gallop  Pulmonary:      Effort: Pulmonary effort is normal  No respiratory distress  Breath sounds: Normal breath sounds  No stridor  No wheezing, rhonchi or rales  Chest:      Chest wall: No tenderness  Musculoskeletal:         General: Tenderness present  No swelling, deformity or signs of injury  Cervical back: Normal range of motion and neck supple  No rigidity  No muscular tenderness  Right lower leg: No edema  Left lower leg: No edema  Lymphadenopathy:      Cervical: No cervical adenopathy  Skin:     General: Skin is warm and dry  Capillary Refill: Capillary refill takes less than 2 seconds  Coloration: Skin is not jaundiced  Findings: No bruising, erythema, lesion or rash  Neurological:      Mental Status: She is alert and oriented to person, place, and time  Mental status is at baseline  Cranial Nerves: No cranial nerve deficit  Sensory: No sensory deficit  Motor: No weakness  Coordination: Coordination normal       Gait: Gait normal    Psychiatric:         Mood and Affect: Mood normal          Behavior: Behavior normal          Thought Content:  Thought content normal          Judgment: Judgment normal

## 2022-12-13 ENCOUNTER — OFFICE VISIT (OUTPATIENT)
Dept: URGENT CARE | Facility: CLINIC | Age: 32
End: 2022-12-13

## 2022-12-13 VITALS
SYSTOLIC BLOOD PRESSURE: 138 MMHG | HEIGHT: 63 IN | DIASTOLIC BLOOD PRESSURE: 84 MMHG | OXYGEN SATURATION: 97 % | RESPIRATION RATE: 18 BRPM | HEART RATE: 107 BPM | WEIGHT: 250 LBS | BODY MASS INDEX: 44.3 KG/M2 | TEMPERATURE: 100.5 F

## 2022-12-13 DIAGNOSIS — M54.6 BACK PAIN OF THORACOLUMBAR REGION: Primary | ICD-10-CM

## 2022-12-13 DIAGNOSIS — M54.50 BACK PAIN OF THORACOLUMBAR REGION: Primary | ICD-10-CM

## 2022-12-13 DIAGNOSIS — R21 RASH: Primary | ICD-10-CM

## 2022-12-13 RX ORDER — METHYLPREDNISOLONE SODIUM SUCCINATE 125 MG/2ML
125 INJECTION, POWDER, LYOPHILIZED, FOR SOLUTION INTRAMUSCULAR; INTRAVENOUS ONCE
Status: COMPLETED | OUTPATIENT
Start: 2022-12-13 | End: 2022-12-13

## 2022-12-13 RX ORDER — CELECOXIB 200 MG/1
200 CAPSULE ORAL 2 TIMES DAILY
Qty: 60 CAPSULE | Refills: 0 | Status: SHIPPED | OUTPATIENT
Start: 2022-12-13

## 2022-12-13 RX ADMIN — METHYLPREDNISOLONE SODIUM SUCCINATE 125 MG: 125 INJECTION, POWDER, LYOPHILIZED, FOR SOLUTION INTRAMUSCULAR; INTRAVENOUS at 20:05

## 2022-12-13 NOTE — TELEPHONE ENCOUNTER
Pt called stated that she took the Voltaren last evening and when she woke up this morning her whole face was swollen  She took Benadryl and that helped her    Can you Rx something else for her, I will put Voltaren on her allergy list  Thank you

## 2022-12-14 NOTE — PATIENT INSTRUCTIONS
Start prednisone tomorrow  Bendadryl as needed  If you develop any increased swelling, tongue or lip swelling, trouble breathing or swallowing, or any new or concerning symptoms please proceed to ER  Follow up with pcp in 3-5 days  Do not take diclofenac

## 2022-12-15 NOTE — PROGRESS NOTES
[facility]        NAME: Sage Waters is a 33 y o  female  : 1990    MRN: [id number]  DATE: December 15, 2022  TIME: 8:22 AM    Assessment and Plan   Rash [R21]  1  Rash  methylPREDNISolone sodium succinate (Solu-MEDROL) injection 125 mg            Patient Instructions     Patient Instructions   Start prednisone tomorrow  Bendadryl as needed  If you develop any increased swelling, tongue or lip swelling, trouble breathing or swallowing, or any new or concerning symptoms please proceed to ER  Follow up with pcp in 3-5 days  Do not take diclofenac  Continue taking prednisone tomorrow as previously prescribed      Follow up with PCP in 3-5 days  Proceed to  ER if symptoms worsen  Chief Complaint     Chief Complaint   Patient presents with   • Allergic Reaction     Patient reports taking medication last night and starting with facial swelling, redness, itchiness, and pain about an hour ago  Patient took Benadryl about an hour ago, reports no relief  History of Present Illness       Allergic Reaction  This is a new problem  The current episode started today  The problem occurs constantly  The problem is unchanged  The problem is mild  Associated with: diclofenac  Time of exposure: took approx 24 hours ago  Associated symptoms include itching and a rash  Pertinent negatives include no abdominal pain, chest pain, chest pressure, coughing, difficulty breathing, drooling, eye itching, eye redness, eye watering, hyperventilation, stridor, trouble swallowing, vomiting or wheezing  There is no swelling present  Past treatments include diphenhydramine  The treatment provided mild relief  There is no history of asthma  Review of Systems   Review of Systems   Constitutional: Negative for chills, diaphoresis, fatigue and fever  HENT: Negative for congestion, drooling, facial swelling and trouble swallowing  Eyes: Negative for redness and itching     Respiratory: Negative for cough, chest tightness, shortness of breath, wheezing and stridor  Cardiovascular: Negative for chest pain and palpitations  Gastrointestinal: Negative for abdominal pain and vomiting  Musculoskeletal: Negative for arthralgias, back pain, joint swelling and myalgias  Skin: Positive for itching and rash  Neurological: Negative  Current Medications       Current Outpatient Medications:   •  Aimovig 140 MG/ML SOAJ, Inject 140mg under the skin every 30 (thirty) days  , Disp: 1 mL, Rfl: 10  •  ALPRAZolam ER (XANAX XR) 1 MG 24 hr tablet, Take 1 tablet (1 mg total) by mouth every morning, Disp: 30 tablet, Rfl: 0  •  baclofen 10 mg tablet, 1 tab TID prn facial pain, jaw pain  Hold flexeril , Disp: 90 tablet, Rfl: 0  •  dihydroergotamine (MIGRANAL) 4 MG/ML nasal spray, 1 spray into each nostril as needed for migraine Use in one nostril as directed  No more than 4 sprays in one hour, Disp: 16 mL, Rfl: 0  •  escitalopram (LEXAPRO) 20 mg tablet, Take 1 tablet (20 mg total) by mouth daily, Disp: 30 tablet, Rfl: 2  •  etonogestrel-ethinyl estradiol (NuvaRing) 0 12-0 015 MG/24HR vaginal ring, Insert ring for 21 days then remove for one week , Disp: 3 each, Rfl: 4  •  indomethacin (INDOCIN) 25 mg capsule, 1-2 tabs BID prn severe headache with food   Hold toradol , Disp: 30 capsule, Rfl: 0  •  magnesium Oxide (MAG-OX) 400 mg TABS, Take 1 tablet (400 mg total) by mouth daily, Disp: 30 tablet, Rfl: 0  •  metFORMIN (GLUCOPHAGE-XR) 500 mg 24 hr tablet, Take 2 tablets (1,000 mg total) by mouth daily with breakfast, Disp: 60 tablet, Rfl: 2  •  OLANZapine (ZyPREXA) 2 5 mg tablet, Take 1 tablet (2 5 mg total) by mouth daily Do not take with reglan , Disp: 30 tablet, Rfl: 2  •  onabotulinumtoxin A (BOTOX) 100 units, Inject as directed, Disp: , Rfl:   •  ondansetron (ZOFRAN) 4 mg tablet, Take 1 tablet (4 mg total) by mouth every 6 (six) hours, Disp: 30 tablet, Rfl: 2  •  pantoprazole (PROTONIX) 40 mg tablet, Take 1 tablet (40 mg total) by mouth daily, Disp: 30 tablet, Rfl: 3  •  pramipexole (MIRAPEX) 0 25 mg tablet, Take 1 tablet (0 25 mg total) by mouth daily at bedtime, Disp: 90 tablet, Rfl: 1  •  prazosin (MINIPRESS) 1 mg capsule, Take 1 capsule (1 mg total) by mouth daily at bedtime, Disp: 30 capsule, Rfl: 2  •  predniSONE 20 mg tablet, Take 2 tablets (40 mg total) by mouth daily for 5 days, Disp: 10 tablet, Rfl: 0  •  verapamil (CALAN) 40 mg tablet, , Disp: , Rfl:   •  B-D 3CC LUER-DEANN SYR 25GX1" 25G X 1" 3 ML MISC,   (Patient not taking: Reported on 11/8/2022), Disp: , Rfl:   •  buPROPion (WELLBUTRIN XL) 300 mg 24 hr tablet, Take 1 tablet (300 mg total) by mouth daily (Patient not taking: Reported on 12/13/2022), Disp: 90 tablet, Rfl: 0  •  celecoxib (CeleBREX) 200 mg capsule, Take 1 capsule (200 mg total) by mouth 2 (two) times a day (Patient not taking: Reported on 12/13/2022), Disp: 60 capsule, Rfl: 0  •  dicyclomine (BENTYL) 20 mg tablet, Take 1 tablet (20 mg total) by mouth 2 (two) times a day (Patient not taking: Reported on 10/9/2022), Disp: 60 tablet, Rfl: 1  •  diphenoxylate-atropine (LOMOTIL) 2 5-0 025 mg per tablet, 1 tablet 4 times daily as needed for diarrhea (Patient not taking: Reported on 10/9/2022), Disp: 30 tablet, Rfl: 1  •  gabapentin (Neurontin) 100 mg capsule, Take 1 capsule (100 mg total) by mouth 3 (three) times a day (Patient not taking: Reported on 11/8/2022), Disp: 30 capsule, Rfl: 0  •  meclizine (ANTIVERT) 12 5 MG tablet, Take 1 tablet (12 5 mg total) by mouth 3 (three) times a day as needed for dizziness (Patient not taking: Reported on 12/13/2022), Disp: 30 tablet, Rfl: 0  •  nystatin (MYCOSTATIN) powder, Apply topically 4 (four) times a day, Disp: 60 g, Rfl: 5  •  olmesartan (BENICAR) 20 mg tablet, Take 1 tablet (20 mg total) by mouth daily (Patient not taking: Reported on 11/8/2022), Disp: 90 tablet, Rfl: 0  •  Riboflavin 400 MG CAPS, Take 1 capsule (400 mg total) by mouth in the morning (Patient not taking: Reported on 11/14/2022), Disp: 30 capsule, Rfl: 0  •  Syringe/Needle, Disp, (SYRINGE 3CC/55FN4-9/4") 27G X 1-1/4" 3 ML MISC, Use for IM injection of ketorolac  (Patient not taking: Reported on 10/9/2022), Disp: 4 each, Rfl: 0    Current Facility-Administered Medications:   •  cyanocobalamin injection 1,000 mcg, 1,000 mcg, Intramuscular, Q30 Days, Ashley Old, DO, 1,000 mcg at 08/03/20 1690  •  cyanocobalamin injection 1,000 mcg, 1,000 mcg, Intramuscular, Q14 Days, Ashley Old, DO, 1,000 mcg at 05/18/20 1146  •  cyanocobalamin injection 1,000 mcg, 1,000 mcg, Intramuscular, Q30 Days, Ashley Old, DO, 1,000 mcg at 09/01/20 1129    Current Allergies     Allergies as of 12/13/2022 - Reviewed 12/13/2022   Allergen Reaction Noted   • Cimzia [certolizumab pegol] Swelling 03/22/2022   • Desvenlafaxine  11/26/2012   • Ixekizumab Vomiting 05/27/2022   • Valproic acid Other (See Comments) 10/18/2017   • Voltaren [diclofenac] Swelling 12/13/2022   • Tizanidine Anxiety 01/20/2021            The following portions of the patient's history were reviewed and updated as appropriate: allergies, current medications, past family history, past medical history, past social history, past surgical history and problem list      Past Medical History:   Diagnosis Date   • Abnormal Pap smear of cervix    • Allergic    • Anxiety    • Arthritis    • Depression    • Diabetes mellitus ([address] )    • Fibromyalgia, primary    • Hypertension    • IBS (irritable bowel syndrome)    • Migraine    • Obesity    • Vitamin B12 deficiency        Past Surgical History:   Procedure Laterality Date   • COLONOSCOPY N/A 5/8/2018    Procedure: COLONOSCOPY;  Surgeon: Zully Georges MD;  Location: [facility] GI LAB;   Service: Gastroenterology   • MT EXC SKIN BENIG >4 CM REMAINDR BODY N/A 7/1/2021    Procedure: EXCISION OF PILAR SCALP CYST X 2 AND RIGHT INNER GROIN NEVVUS;  Surgeon: Eva Wray MD;  Location: [address] [place] [address];  Service: Plastics   • MT RECMPL WND SCALP,EXTR 2 6-7 5 CM N/A 7/1/2021    Procedure: CLOSURE WOUND SCALP X 2 AND RIGHT INNER GROIN;  Surgeon: Camila Martin MD;  Location: [address] [place] [address];  Service: Plastics   • TONSILLECTOMY     • WISDOM TOOTH EXTRACTION         Family History   Problem Relation Age of Onset   • Rheum arthritis Mother    • Psoriasis Mother    • Other Mother    • Hypertension Mother    • Diabetes unspecified Mother    • Sjogren's syndrome Mother    • Alcohol abuse Mother    • Drug abuse Mother    • Anxiety disorder Father    • Alcohol abuse Father    • Lung cancer Maternal Grandfather    • Cancer Other         bone   • Diabetes Other    • Other Other         High blood pressure   • Depression Maternal Grandmother          Medications have been verified  Objective   /84   Pulse (!) 107   Temp 100 5 °F (38 1 °C) (Temporal)   Resp 18   Ht 5' 3" (1 6 m)   Wt 113 kg (250 lb)   SpO2 97%   BMI 44 29 kg/m²   No LMP recorded  (Menstrual status: Birth Control)  Physical Exam     Physical Exam  Constitutional:       General: She is not in acute distress  Appearance: Normal appearance  She is not diaphoretic  HENT:      Head: Normocephalic and atraumatic  Mouth/Throat:      Mouth: Mucous membranes are moist       Pharynx: Oropharynx is clear  Uvula midline  Comments: No facial swelling, tongue or lip swelling noted  Airway patent    Cardiovascular:      Rate and Rhythm: Normal rate and regular rhythm  Heart sounds: Normal heart sounds, S1 normal and S2 normal    Pulmonary:      Effort: Pulmonary effort is normal       Breath sounds: Normal breath sounds and air entry  No stridor  Skin:     General: Skin is warm and dry  Capillary Refill: Capillary refill takes less than 2 seconds  Findings: Erythema (mild erythema to bilateral cheeks ) present  Neurological:      Mental Status: She is alert

## 2022-12-20 ENCOUNTER — OFFICE VISIT (OUTPATIENT)
Dept: URGENT CARE | Facility: CLINIC | Age: 32
End: 2022-12-20

## 2022-12-20 VITALS
WEIGHT: 260 LBS | DIASTOLIC BLOOD PRESSURE: 80 MMHG | TEMPERATURE: 97.8 F | BODY MASS INDEX: 46.07 KG/M2 | RESPIRATION RATE: 18 BRPM | HEIGHT: 63 IN | SYSTOLIC BLOOD PRESSURE: 143 MMHG | HEART RATE: 98 BPM | OXYGEN SATURATION: 96 %

## 2022-12-20 DIAGNOSIS — R21 RASH: Primary | ICD-10-CM

## 2022-12-20 DIAGNOSIS — G43.709 CHRONIC MIGRAINE WITHOUT AURA WITHOUT STATUS MIGRAINOSUS, NOT INTRACTABLE: ICD-10-CM

## 2022-12-20 RX ORDER — CLOTRIMAZOLE AND BETAMETHASONE DIPROPIONATE 10; .64 MG/G; MG/G
CREAM TOPICAL 2 TIMES DAILY
Qty: 30 G | Refills: 0 | Status: SHIPPED | OUTPATIENT
Start: 2022-12-20

## 2022-12-20 NOTE — TELEPHONE ENCOUNTER
Medication Refill Request     Name of Medication Zyprexa  Dose/Frequency 2 5mg 1 daily  Quantity 30 tablet  Verified pharmacy   [x]  Verified ordering Provider   [x]  Does patient have enough for the next 3 days? Yes [] No []  Does patient have a follow-up appointment scheduled?  Yes [x] No []   If so when is appointment: 1/4/23

## 2022-12-20 NOTE — PATIENT INSTRUCTIONS
Take medication as directed  If you develop any increased redness, rash is spreading, facial swelling, shortness of breath, difficulty breathing, fever, any new or concerning symptoms please return or proceed ER    Advised follow-up with PCP in 3-5 days

## 2022-12-20 NOTE — PROGRESS NOTES
[facility]        NAME: James Carr is a 33 y o  female  : 1990    MRN: [id number]  DATE: 2022  TIME: 1:51 PM    Assessment and Plan   Rash [R21]  1  Rash  clotrimazole-betamethasone (LOTRISONE) 1-0 05 % cream            Patient Instructions     Patient Instructions   Take medication as directed  If you develop any increased redness, rash is spreading, facial swelling, shortness of breath, difficulty breathing, fever, any new or concerning symptoms please return or proceed ER  Advised follow-up with PCP in 3-5 days          Follow up with PCP in 3-5 days  Proceed to  ER if symptoms worsen  Chief Complaint     Chief Complaint   Patient presents with   • Rash     Patient c/o rash to upper legs/groin that started a month ago  Patient reports Nystatin powder not improving  History of Present Illness       Rash  This is a new problem  The current episode started 1 to 4 weeks ago  The problem is unchanged  The affected locations include the groin  The problem is mild  The rash is characterized by redness and pain  She was exposed to nothing  Pertinent negatives include no diarrhea, facial edema, fatigue, fever, itching, joint pain or vomiting  Treatments tried: nystatin powder  The treatment provided mild relief  There were no sick contacts  Review of Systems   Review of Systems   Constitutional: Negative for chills, diaphoresis, fatigue and fever  HENT: Negative  Respiratory: Negative  Cardiovascular: Negative  Gastrointestinal: Negative for abdominal pain, diarrhea and vomiting  Genitourinary: Negative  Musculoskeletal: Negative for joint pain  Skin: Positive for rash  Negative for itching and wound  Neurological: Negative  Current Medications       Current Outpatient Medications:   •  Aimovig 140 MG/ML SOAJ, Inject 140mg under the skin every 30 (thirty) days  , Disp: 1 mL, Rfl: 10  •  ALPRAZolam ER (XANAX XR) 1 MG 24 hr tablet, Take 1 tablet (1 mg total) by mouth every morning, Disp: 30 tablet, Rfl: 0  •  baclofen 10 mg tablet, 1 tab TID prn facial pain, jaw pain  Hold flexeril , Disp: 90 tablet, Rfl: 0  •  clotrimazole-betamethasone (LOTRISONE) 1-0 05 % cream, Apply topically 2 (two) times a day, Disp: 30 g, Rfl: 0  •  dihydroergotamine (MIGRANAL) 4 MG/ML nasal spray, 1 spray into each nostril as needed for migraine Use in one nostril as directed  No more than 4 sprays in one hour, Disp: 16 mL, Rfl: 0  •  escitalopram (LEXAPRO) 20 mg tablet, Take 1 tablet (20 mg total) by mouth daily, Disp: 30 tablet, Rfl: 2  •  etonogestrel-ethinyl estradiol (NuvaRing) 0 12-0 015 MG/24HR vaginal ring, Insert ring for 21 days then remove for one week , Disp: 3 each, Rfl: 4  •  indomethacin (INDOCIN) 25 mg capsule, 1-2 tabs BID prn severe headache with food   Hold toradol , Disp: 30 capsule, Rfl: 0  •  magnesium Oxide (MAG-OX) 400 mg TABS, Take 1 tablet (400 mg total) by mouth daily, Disp: 30 tablet, Rfl: 0  •  metFORMIN (GLUCOPHAGE-XR) 500 mg 24 hr tablet, Take 2 tablets (1,000 mg total) by mouth daily with breakfast, Disp: 60 tablet, Rfl: 2  •  nystatin (MYCOSTATIN) powder, Apply topically 4 (four) times a day, Disp: 60 g, Rfl: 5  •  OLANZapine (ZyPREXA) 2 5 mg tablet, Take 1 tablet (2 5 mg total) by mouth daily Do not take with reglan , Disp: 30 tablet, Rfl: 2  •  onabotulinumtoxin A (BOTOX) 100 units, Inject as directed, Disp: , Rfl:   •  ondansetron (ZOFRAN) 4 mg tablet, Take 1 tablet (4 mg total) by mouth every 6 (six) hours, Disp: 30 tablet, Rfl: 2  •  pantoprazole (PROTONIX) 40 mg tablet, Take 1 tablet (40 mg total) by mouth daily, Disp: 30 tablet, Rfl: 3  •  pramipexole (MIRAPEX) 0 25 mg tablet, Take 1 tablet (0 25 mg total) by mouth daily at bedtime, Disp: 90 tablet, Rfl: 1  •  prazosin (MINIPRESS) 1 mg capsule, Take 1 capsule (1 mg total) by mouth daily at bedtime, Disp: 30 capsule, Rfl: 2  •  verapamil (CALAN) 40 mg tablet, , Disp: , Rfl:   •  B-D 3CC LUER-DEANN SYR 25GX1" 25G X 1" 3 ML MISC,   (Patient not taking: Reported on 11/8/2022), Disp: , Rfl:   •  buPROPion (WELLBUTRIN XL) 300 mg 24 hr tablet, Take 1 tablet (300 mg total) by mouth daily (Patient not taking: Reported on 12/13/2022), Disp: 90 tablet, Rfl: 0  •  celecoxib (CeleBREX) 200 mg capsule, Take 1 capsule (200 mg total) by mouth 2 (two) times a day (Patient not taking: Reported on 12/13/2022), Disp: 60 capsule, Rfl: 0  •  dicyclomine (BENTYL) 20 mg tablet, Take 1 tablet (20 mg total) by mouth 2 (two) times a day (Patient not taking: Reported on 10/9/2022), Disp: 60 tablet, Rfl: 1  •  diphenoxylate-atropine (LOMOTIL) 2 5-0 025 mg per tablet, 1 tablet 4 times daily as needed for diarrhea (Patient not taking: Reported on 10/9/2022), Disp: 30 tablet, Rfl: 1  •  gabapentin (Neurontin) 100 mg capsule, Take 1 capsule (100 mg total) by mouth 3 (three) times a day (Patient not taking: Reported on 11/8/2022), Disp: 30 capsule, Rfl: 0  •  meclizine (ANTIVERT) 12 5 MG tablet, Take 1 tablet (12 5 mg total) by mouth 3 (three) times a day as needed for dizziness (Patient not taking: Reported on 12/13/2022), Disp: 30 tablet, Rfl: 0  •  olmesartan (BENICAR) 20 mg tablet, Take 1 tablet (20 mg total) by mouth daily (Patient not taking: Reported on 11/8/2022), Disp: 90 tablet, Rfl: 0  •  Riboflavin 400 MG CAPS, Take 1 capsule (400 mg total) by mouth in the morning (Patient not taking: Reported on 11/14/2022), Disp: 30 capsule, Rfl: 0  •  Syringe/Needle, Disp, (SYRINGE 3CC/73VV1-2/4") 27G X 1-1/4" 3 ML MISC, Use for IM injection of ketorolac   (Patient not taking: Reported on 10/9/2022), Disp: 4 each, Rfl: 0    Current Facility-Administered Medications:   •  cyanocobalamin injection 1,000 mcg, 1,000 mcg, Intramuscular, Q30 Days, Petar Haile DO, 1,000 mcg at 08/03/20 2871  •  cyanocobalamin injection 1,000 mcg, 1,000 mcg, Intramuscular, Q14 Days, Petar Haile DO, 1,000 mcg at 05/18/20 1146  •  cyanocobalamin injection 1,000 mcg, 1,000 mcg, Intramuscular, Q30 Days, Elpidio Iglesias DO, 1,000 mcg at 09/01/20 1129    Current Allergies     Allergies as of 12/20/2022 - Reviewed 12/20/2022   Allergen Reaction Noted   • Cimzia [certolizumab pegol] Swelling 03/22/2022   • Desvenlafaxine  11/26/2012   • Ixekizumab Vomiting 05/27/2022   • Valproic acid Other (See Comments) 10/18/2017   • Voltaren [diclofenac] Swelling 12/13/2022   • Tizanidine Anxiety 01/20/2021            The following portions of the patient's history were reviewed and updated as appropriate: allergies, current medications, past family history, past medical history, past social history, past surgical history and problem list      Past Medical History:   Diagnosis Date   • Abnormal Pap smear of cervix    • Allergic    • Anxiety    • Arthritis    • Depression    • Diabetes mellitus ([address] )    • Fibromyalgia, primary    • Hypertension    • IBS (irritable bowel syndrome)    • Migraine    • Obesity    • Vitamin B12 deficiency        Past Surgical History:   Procedure Laterality Date   • COLONOSCOPY N/A 5/8/2018    Procedure: COLONOSCOPY;  Surgeon: Mickie Aguiar MD;  Location: [facility] GI LAB;   Service: Gastroenterology   • MI EXC SKIN BENIG >4 CM REMAINDR BODY N/A 7/1/2021    Procedure: EXCISION OF PILAR SCALP CYST X 2 AND RIGHT INNER GROIN NEVVUS;  Surgeon: Aneesh Leyva MD;  Location: [address] [place] [address] OR;  Service: Plastics   • MI RECMPL WND SCALP,EXTR 2 6-7 5 CM N/A 7/1/2021    Procedure: CLOSURE WOUND SCALP X 2 AND RIGHT INNER GROIN;  Surgeon: Aneesh Leyva MD;  Location: [address] [place] [address];  Service: Plastics   • TONSILLECTOMY     • WISDOM TOOTH EXTRACTION         Family History   Problem Relation Age of Onset   • Rheum arthritis Mother    • Psoriasis Mother    • Other Mother    • Hypertension Mother    • Diabetes unspecified Mother    • Sjogren's syndrome Mother    • Alcohol abuse Mother    • Drug abuse Mother    • Anxiety disorder Father    • Alcohol abuse Father    • Lung cancer Maternal Grandfather    • Cancer Other         bone   • Diabetes Other    • Other Other         High blood pressure   • Depression Maternal Grandmother          Medications have been verified  Objective   /80   Pulse 98   Temp 97 8 °F (36 6 °C) (Temporal)   Resp 18   Ht 5' 3" (1 6 m)   Wt 118 kg (260 lb)   SpO2 96%   BMI 46 06 kg/m²   No LMP recorded  (Menstrual status: Birth Control)  Physical Exam     Physical Exam  Constitutional:       General: She is not in acute distress  Appearance: Normal appearance  She is not diaphoretic  HENT:      Head: Normocephalic and atraumatic  Cardiovascular:      Rate and Rhythm: Normal rate and regular rhythm  Heart sounds: Normal heart sounds, S1 normal and S2 normal    Pulmonary:      Effort: Pulmonary effort is normal       Breath sounds: Normal breath sounds and air entry  Skin:     General: Skin is warm  Capillary Refill: Capillary refill takes less than 2 seconds  Findings: Rash (area of erythema to bilateral groin folds) present  Neurological:      Mental Status: She is alert

## 2022-12-21 RX ORDER — OLANZAPINE 2.5 MG/1
2.5 TABLET ORAL DAILY
Qty: 30 TABLET | Refills: 0 | Status: SHIPPED | OUTPATIENT
Start: 2022-12-21

## 2022-12-21 NOTE — TELEPHONE ENCOUNTER
Cruz Schneider called the clinic today and appropriately requested refill of psychotropic medications (Zyprexa)  As such, will refill script today for 30-days as I am covering for patient's primary provider  Jaja Mauro has a scheduled follow-up visit for medication management on 1/4/23

## 2022-12-23 ENCOUNTER — TELEMEDICINE (OUTPATIENT)
Dept: BEHAVIORAL/MENTAL HEALTH CLINIC | Facility: CLINIC | Age: 32
End: 2022-12-23

## 2022-12-23 DIAGNOSIS — F33.1 DEPRESSION, MAJOR, RECURRENT, MODERATE (HCC): Primary | Chronic | ICD-10-CM

## 2022-12-23 DIAGNOSIS — F41.1 GENERALIZED ANXIETY DISORDER: Chronic | ICD-10-CM

## 2022-12-23 NOTE — PSYCH
Virtual Regular Visit    Verification of patient location:    Patient is located in the following state in which I hold an active license [place]    Assessment/Plan:    Problem List Items Addressed This Visit        Other    Generalized anxiety disorder (Chronic)    Depression, major, recurrent, moderate ([address] ) - Primary (Chronic)     Goals addressed in session: Goal 1      Reason for visit is   Chief Complaint   Patient presents with   • Virtual Regular Visit      Encounter provider APARNA Escalona    Provider located at [address] [place] [id number] [phone]    Recent Visits  No visits were found meeting these conditions  Showing recent visits within past 7 days and meeting all other requirements  Today's Visits  Date Type Provider Dept   12/23/22 [address] today's visits and meeting all other requirements  Future Appointments  No visits were found meeting these conditions  Showing future appointments within next 150 days and meeting all other requirements     The patient was identified by name and date of birth  Tere Scarce was informed that this is a telemedicine visit and that the visit is being conducted throughthe [facility]  She agrees to proceed     My office door was closed  No one else was in the room  She acknowledged consent and understanding of privacy and security of the video platform  The patient has agreed to participate and understands they can discontinue the visit at any time  Patient is aware this is a billable service  Sugey Shelton is a 33 y o  female  DATA: Met with Stephanie for scheduled individual session  Topics of discussion included relationships with family, physical health concerns, financial issues, relationships with friends and mood regulation and symptoms  "I've been doing better  I've had some good days " Stephanie discussed her health-related issues  She states that she continues to have some physical symptoms, which wax and wane, depending upon the weather  She states that she has been able to do some cleaning, which has given her a sense of accomplishment  Tra Delgado discussed giving gifts to her best friend's daughters  She discussed the ciara this experience provided to her  We discussed how this experience was an exercise in mindfulness, because the ciara she and the girls experienced was based on living in the moment  Stephanie discussed her sleep cycle  She states that she has been sleeping a lot  She states that she feels that she has been sleeping more due to her physical tiredness, not because of depression  She states that she has been working on being more physically active (cleaning her home) and has also been engaging in more social activities (primarily with her best friend)  She states that she does not feel depressed  Tra Delgado states that she received a call regarding her ineligibility for SSDI, due to not working the number of quarters necessary to make her eligible for the SSDI benefit  This clinician discussed the formulas that [facility] uses to determine this level of eligibility  Tra Delgado discussed reconnecting with a friend of hers who has fibromyalgia  She states that her friend explains that her fibro is a result of astrological phenomena, which Tra Delgado does not believe  We discussed the physiological and emotional connections and how she can improve her physical symptoms  We discussed things that Tra Delgado can do to make small positive changes  Tra Delgado states that she used to use a white-board to write down goals and positive affirmations  She plans to get a new one, after [place], and will restart this practice  We discussed Stephanie's plans for [place] and [place]  She has plans to spend her [place] with her mother and grandmother   She states that she is not sure what she will do for [employer] (if anything)  Client shows evidence of utilizing Mindfulness-based strategies and emotion regulation skills skills to manage mental health symptoms  During this session, this clinician used the following therapeutic modalities: supportive psychotherapy, mindfulness-based strategies, DBT-informed skills, Motivational Interviewing and solution-focused therapy  ASSESSMENT: Harry Sanchez presents with a normal mood  Her affect is normal range and intensity, appropriate  Harry Sanchez exhibits good therapeutic rapport with this clinician  Harry Sanchez continues to exhibit willingness to work on treatment goals and objectives  Harry Sanchez presents with a minimal risk of suicide, minimal risk of self-harm, and minimal risk of harm to others  PLAN: Harry Sanchez will return in approximately two weeks for the next scheduled session  Between sessions, Harry Sanchez will continue to work on practicing her mindfulness skills and monitoring her moods  She will continue to engage in activities that bring her a sense of ciara and accomplishment  She will report back during the next session re: successes and barriers  At the next session, this clinician will use supportive psychotherapy, client-centered therapy, mindfulness-based strategies, DBT-informed skills, Motivational Interviewing and solution-focused therapy to address her mood regulation, health-related issues, and relationship concerns, in an effort to assist Harry Sanchez with meeting treatment goals  Current suicide risk : Low     Behavioral Health Treatment Plan [facility]: Diagnosis and Treatment Plan explained to Rigoberto Foot relates understanding diagnosis and is agreeable to Treatment Plan   Yes     Visit start and stop times:    12/23/22  Start Time: 0800  Stop Time: 0848  Total Visit Time: 48 minutes

## 2022-12-28 ENCOUNTER — TELEPHONE (OUTPATIENT)
Dept: NEUROLOGY | Facility: CLINIC | Age: 32
End: 2022-12-28

## 2022-12-28 NOTE — TELEPHONE ENCOUNTER
Received  transcription:    This is [facility] pharmacy, [phone]. Cruz Schneider. Needs a new prescription for her verapamil 40 mg. She takes one tablet twice a day at this point. I believe She gets quantity 60 at a time if you could send that to me electronically or call me back at [phone]. I'd appreciate it. Thank you.

## 2022-12-29 NOTE — TELEPHONE ENCOUNTER
I called patient to clarify medication; she confirmed she is not taking and this is an old prescription.  No documentation of patient taking either in last office noted dated

## 2023-01-03 ENCOUNTER — OFFICE VISIT (OUTPATIENT)
Dept: FAMILY MEDICINE CLINIC | Facility: CLINIC | Age: 33
End: 2023-01-03

## 2023-01-03 VITALS
DIASTOLIC BLOOD PRESSURE: 88 MMHG | TEMPERATURE: 97.7 F | OXYGEN SATURATION: 98 % | SYSTOLIC BLOOD PRESSURE: 122 MMHG | HEART RATE: 102 BPM | BODY MASS INDEX: 44.3 KG/M2 | WEIGHT: 250 LBS | HEIGHT: 63 IN

## 2023-01-03 DIAGNOSIS — J06.9 UPPER RESPIRATORY TRACT INFECTION, UNSPECIFIED TYPE: ICD-10-CM

## 2023-01-03 DIAGNOSIS — M46.90 INFLAMMATORY SPONDYLOPATHY, UNSPECIFIED SPINAL REGION (HCC): Primary | ICD-10-CM

## 2023-01-03 RX ORDER — PREDNISONE 20 MG/1
40 TABLET ORAL DAILY
Qty: 10 TABLET | Refills: 0 | Status: SHIPPED | OUTPATIENT
Start: 2023-01-03 | End: 2023-01-08

## 2023-01-03 RX ORDER — AMOXICILLIN AND CLAVULANATE POTASSIUM 875; 125 MG/1; MG/1
1 TABLET, FILM COATED ORAL EVERY 12 HOURS SCHEDULED
Qty: 14 TABLET | Refills: 0 | Status: SHIPPED | OUTPATIENT
Start: 2023-01-03 | End: 2023-01-04

## 2023-01-03 NOTE — PROGRESS NOTES
Assessment/Plan:       Diagnoses and all orders for this visit:    Inflammatory spondylopathy, unspecified spinal region ([address] )  -     predniSONE 20 mg tablet; Take 2 tablets (40 mg total) by mouth daily for 5 days    Upper respiratory tract infection, unspecified type  -     amoxicillin-clavulanate (AUGMENTIN) 875-125 mg per tablet; Take 1 tablet by mouth every 12 (twelve) hours for 7 days            Subjective:        Patient ID: Chaka Moeller is a 33 y o  female  Patient is here with arthritic flare which is prednisone  Patient also with scabs in her nose bilaterally left side greater than right  Patient with postnasal drip associated with this  No treatment use otherwise  The following portions of the patient's history were reviewed and updated as appropriate: allergies, current medications, past family history, past medical history, past social history, past surgical history and problem list       Review of Systems   Constitutional: Negative  HENT: Positive for congestion and postnasal drip  Eyes: Negative  Respiratory: Negative  Cardiovascular: Negative  Gastrointestinal: Negative  Endocrine: Negative  Genitourinary: Negative  Musculoskeletal: Positive for arthralgias  Skin: Negative  Allergic/Immunologic: Negative  Neurological: Negative  Hematological: Negative  Psychiatric/Behavioral: Negative  Objective:      BMI Counseling: Body mass index is 44 29 kg/m²  The BMI is above normal  Nutrition recommendations include consuming healthier snacks  Exercise recommendations include strength training exercises  Rationale for BMI follow-up plan is due to patient being overweight or obese               /88 (BP Location: Right arm, Patient Position: Sitting, Cuff Size: Large)   Pulse 102   Temp 97 7 °F (36 5 °C) (Temporal)   Ht 5' 3" (1 6 m)   Wt 113 kg (250 lb)   SpO2 98%   BMI 44 29 kg/m²          Physical Exam  Vitals and nursing note reviewed  Constitutional:       General: She is not in acute distress  Appearance: Normal appearance  She is not ill-appearing, toxic-appearing or diaphoretic  HENT:      Head: Normocephalic and atraumatic  Nose: Congestion and rhinorrhea present  Mouth/Throat:      Pharynx: Oropharyngeal exudate present  Musculoskeletal:         General: Tenderness present  Neurological:      Mental Status: She is alert     Psychiatric:         Mood and Affect: Mood normal

## 2023-01-04 ENCOUNTER — TELEPHONE (OUTPATIENT)
Dept: PSYCHIATRY | Facility: CLINIC | Age: 33
End: 2023-01-04

## 2023-01-04 ENCOUNTER — OFFICE VISIT (OUTPATIENT)
Dept: PSYCHIATRY | Facility: CLINIC | Age: 33
End: 2023-01-04

## 2023-01-04 DIAGNOSIS — F43.12 CHRONIC POST-TRAUMATIC STRESS DISORDER (PTSD): ICD-10-CM

## 2023-01-04 DIAGNOSIS — F41.1 GENERALIZED ANXIETY DISORDER: Chronic | ICD-10-CM

## 2023-01-04 DIAGNOSIS — F41.0 PANIC DISORDER WITHOUT AGORAPHOBIA: ICD-10-CM

## 2023-01-04 DIAGNOSIS — G43.709 CHRONIC MIGRAINE WITHOUT AURA WITHOUT STATUS MIGRAINOSUS, NOT INTRACTABLE: ICD-10-CM

## 2023-01-04 RX ORDER — BUPROPION HYDROCHLORIDE 300 MG/1
300 TABLET ORAL DAILY
Qty: 90 TABLET | Refills: 0 | Status: SHIPPED | OUTPATIENT
Start: 2023-01-04

## 2023-01-04 RX ORDER — OLANZAPINE 2.5 MG/1
2.5 TABLET ORAL DAILY
Qty: 30 TABLET | Refills: 2 | Status: SHIPPED | OUTPATIENT
Start: 2023-01-04

## 2023-01-04 RX ORDER — PRAZOSIN HYDROCHLORIDE 1 MG/1
1 CAPSULE ORAL
Qty: 90 CAPSULE | Refills: 0 | Status: SHIPPED | OUTPATIENT
Start: 2023-01-04 | End: 2023-02-13 | Stop reason: SDUPTHER

## 2023-01-04 RX ORDER — ESCITALOPRAM OXALATE 20 MG/1
20 TABLET ORAL DAILY
Qty: 30 TABLET | Refills: 2 | Status: SHIPPED | OUTPATIENT
Start: 2023-01-04 | End: 2023-01-04 | Stop reason: SDUPTHER

## 2023-01-04 RX ORDER — HYDROXYZINE HYDROCHLORIDE 25 MG/1
25 TABLET, FILM COATED ORAL EVERY 6 HOURS PRN
Qty: 30 TABLET | Refills: 0 | Status: SHIPPED | OUTPATIENT
Start: 2023-01-04

## 2023-01-04 RX ORDER — ESCITALOPRAM OXALATE 20 MG/1
20 TABLET ORAL DAILY
Qty: 90 TABLET | Refills: 0 | Status: SHIPPED | OUTPATIENT
Start: 2023-01-04 | End: 2023-04-07 | Stop reason: SDUPTHER

## 2023-01-04 RX ORDER — ALPRAZOLAM 2 MG/1
2 TABLET, EXTENDED RELEASE ORAL EVERY MORNING
Qty: 30 TABLET | Refills: 2 | Status: SHIPPED | OUTPATIENT
Start: 2023-01-04

## 2023-01-04 RX ORDER — PRAZOSIN HYDROCHLORIDE 1 MG/1
1 CAPSULE ORAL
Qty: 30 CAPSULE | Refills: 2 | Status: SHIPPED | OUTPATIENT
Start: 2023-01-04 | End: 2023-01-04 | Stop reason: SDUPTHER

## 2023-01-04 NOTE — TELEPHONE ENCOUNTER
Pharmacy called regarding scripts sent today 1/4/23  Pharmacist stated prior [place] is needed for Xanax XR 2mg  The Lexapro 20mg was sent as 30 tablets with 2 refills as well as Minipress 1mg was sent at 30 capsules with 2 refills  Insurance is requesting scripts be sent as 90 day tablets/capsules with no refills for both of these medications  Please review  Thank you

## 2023-01-04 NOTE — PSYCH
Visit Time    Visit Start Time: 10:30  Visit Stop Time: 11:00  Total Visit Duration: 30 minutes    Subjective:Medication Management      Patient ID: Chaka Moeller is a 33 y o  female with MDD, BESSY and panic disorder    HPI ROS Appetite Changes and Sleep: normal appetite, normal energy level, no weight change and normal number of sleep hours   Nae Barr stated she continues to struggle with anxiety and frequent panic attacks  She stated Alprazolam helps but the effects wear off quickly and she started long acting version of Alprazolam to help manage her symptoms  She has been on Alprazolam XR for several months now and started at 0 5 mg then 1 mg daily and is still having significant anxiety and frequent panic  Will try dose increase on Xanax XR to 2 mg daily and will add prn Hydroxyzine 25 mg for panic attacks      She continues to meet with her counselor on a regular basis  She denies recent health changes or new medications   She agrees [facility] stated  treatment and will schedule follow up in 3 months or sooner if needed        Review Of Systems:     Mood Anxiety and Depression   Behavior Normal    Thought Content Disturbing Thoughts, Feelings   General Emotional Problems and Decreased Functioning   Personality Normal   Other Psych Symptoms Normal   Constitutional Negative   ENT Negative   Cardiovascular Negative   Respiratory Negative   Gastrointestinal Negative   Genitourinary Negative   Musculoskeletal Negative   Integumentary Negative   Neurological Negative   Endocrine Normal    Other Symptoms Normal        Laboratory Results:   Most Recent Labs:   Lab Results   Component Value Date    WBC 9 29 12/02/2022    RBC 4 22 12/02/2022    HGB 11 4 (L) 12/02/2022    HCT 35 7 12/02/2022     (H) 12/02/2022    RDW 13 9 12/02/2022    NEUTROABS 4 46 12/02/2022     03/23/2014    K 3 9 12/02/2022     (H) 12/02/2022    CO2 20 (L) 12/02/2022    BUN 7 12/02/2022    CREATININE 0 66 12/02/2022    GLUCOSE 88 03/23/2014    CALCIUM 9 1 12/02/2022    AST 17 12/02/2022    ALT 18 12/02/2022    ALKPHOS 66 12/02/2022    PROT 7 7 03/23/2014    BILITOT 0 4 03/23/2014    CHOL 300 (H) 03/17/2014    TRIG 197 (H) 03/17/2014    HDL 93 03/17/2014    [place] 207 (H) 03/17/2014    PREGSERUM Negative 08/23/2022    HCGQUANT <1 05/19/2022       Substance Abuse History:  Social History     Substance and Sexual Activity   Drug Use Yes   • Frequency: 2 0 times per week   • Types: Marijuana    Comment: medical marijuana (vape)       Family Psychiatric History:   Family History   Problem Relation Age of Onset   • Rheum arthritis Mother    • Psoriasis Mother    • Other Mother    • Hypertension Mother    • Diabetes unspecified Mother    • Sjogren's syndrome Mother    • Alcohol abuse Mother    • Drug abuse Mother    • Anxiety disorder Father    • Alcohol abuse Father    • Lung cancer Maternal Grandfather    • Cancer Other         bone   • Diabetes Other    • Other Other         High blood pressure   • Depression Maternal Grandmother        The following portions of the patient's history were reviewed and updated as appropriate: allergies, current medications, past family history, past medical history, past social history, past surgical history and problem list     Social History     Socioeconomic History   • Marital status: Single     Spouse name: Not on file   • Number of children: 0   • Years of education: 15   • Highest education level: Not on file   Occupational History   • Occupation: Unemployed     Employer: [employer]   Tobacco Use   • Smoking status: Never   • Smokeless tobacco: Never   Vaping Use   • Vaping Use: Some days   • Start date: 7/1/2019   • Substances: THC, CBD   Substance and Sexual Activity   • Alcohol use: Yes     Comment: rarely   • Drug use: Yes     Frequency: 2 0 times per week     Types: Marijuana     Comment: medical marijuana (vape)   • Sexual activity: Not Currently     Partners: Male     Comment: Nuva ring   Other Topics Concern   • Not on file   Social History Narrative    Home:  Living with mom and MGM        Education:    Pt denies any h/o learning disability Dxs but admits to having great difficulty in math requiring a   She reached childhood milestones on time as far as he knows  Graduated HS 2009    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 college art classes--she stopped due to anxiety from dissatisfaction with her classes--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 felt they were telling her what to create  On reflection, she feels it was moreso her anxiety as the cause of her leaving school, and she was using the other reason as an excuse so she would not have to admit to anxiety at that time  She is now very open about her anxiety and does not mind that I have this information in the general social section of her chart  Social Determinants of Health     Financial Resource Strain: Low Risk    • Difficulty of Paying Living Expenses: Not hard at all   Food Insecurity: No Food Insecurity   • Worried About Running Out of Food in the Last Year: Never true   • Ran Out of Food in the Last Year: Never true   Transportation Needs: No Transportation Needs   • Lack of Transportation (Medical): No   • Lack of Transportation (Non-Medical): No   Physical Activity: Not on file   Stress: Not on file   Social Connections: Not on file   Intimate Partner Violence: Not on file   Housing Stability: Not on file     Social History     Social History Narrative    Home:  Living with mom and MGM        Education:    Pt denies any h/o learning disability Dxs but admits to having great difficulty in math requiring a   She reached childhood milestones on time as far as he knows      Graduated HS 2009    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 college art classes--she stopped due to anxiety from dissatisfaction with her classes--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 felt they were telling her what to create  On reflection, she feels it was moreso her anxiety as the cause of her leaving school, and she was using the other reason as an excuse so she would not have to admit to anxiety at that time  She is now very open about her anxiety and does not mind that I have this information in the general social section of her chart  Objective:       Mental status:  Appearance calm and cooperative , adequate hygiene and grooming and good eye contact    Mood dysphoric   Affect affect was constricted   Speech a normal rate and fluent   Thought Processes coherent/organized and normal thought processes   Hallucinations no hallucinations present    Thought Content no delusions   Abnormal Thoughts no suicidal thoughts  and no homicidal thoughts    Orientation  oriented to person and place and time   Remote Memory short term memory intact and long term memory intact   Attention Span concentration intact   Intellect Appears to be of Average Intelligence   Insight Limited insight   Judgement judgment was limited   Muscle Strength Muscle strength and tone were normal and Normal gait    Language no difficulty naming common objects and no difficulty repeating a phrase    Fund of Knowledge displays adequate knowledge of current events, adequate fund of knowledge regarding past history and adequate fund of knowledge regarding vocabulary                Assessment/Plan:       Diagnoses and all orders for this visit:    Panic disorder without agoraphobia  -     ALPRAZolam ER (XANAX XR) 2 MG 24 hr tablet; Take 1 tablet (2 mg total) by mouth every morning  -     hydrOXYzine HCL (ATARAX) 25 mg tablet; Take 1 tablet (25 mg total) by mouth every 6 (six) hours as needed for anxiety    Chronic post-traumatic stress disorder (PTSD)  -      prazosin (MINIPRESS) 1 mg capsule;  Take 1 capsule (1 mg total) by mouth daily at bedtime    Chronic migraine without aura without status migrainosus, not intractable  -     OLANZapine (ZyPREXA) 2 5 mg tablet; Take 1 tablet (2 5 mg total) by mouth daily Do not take with reglan  Generalized anxiety disorder  -     escitalopram (LEXAPRO) 20 mg tablet; Take 1 tablet (20 mg total) by mouth daily  -     buPROPion (WELLBUTRIN XL) 300 mg 24 hr tablet; Take 1 tablet (300 mg total) by mouth daily            Treatment Recommendations- Risks Benefits      Immediate Medical/Psychiatric/Psychotherapy Treatments and Any Precautions: continue current treatment , increase Xanax XR to 2 mg qam, add Hydroxyzine 15 mg po qd prn panic attacks    Risks, Benefits And Possible Side Effects Of Medications:  {PSYCH RISK, BENEFITS AND POSSIBLE SIDE EFFECTS (Optional):12881    Controlled Medication Discussion: Discussed with patient Black Box warning on concurrent use of benzodiazepines and opioid medications including sedation, respiratory depression, coma and death  Patient understands the risk of treatment with benzodiazepines in addition to opioids and wants to continue taking those medications  , Discussed with patient the risks of sedation, respiratory depression, impairment of ability to drive and potential for abuse and addiction related to treatment with benzodiazepine medications  The patient understands risk of treatment with benzodiazepine medications, agrees to not drive if feels impaired and agrees to take medications as prescribed  and The patient has been filling controlled prescriptions on time as prescribed to [facility]  program       Psychotherapy Provided: Individual psychotherapy provided  Individual psychotherapy provided: Yes  Counseling was provided during the session today for 16 minutes  Medications, treatment progress and treatment plan reviewed with Cruz  Medication education provided to Cruz  Goals discussed during in session: improve control of anxiety and improve control of depression     Recent stressor including COVID-19 issues, relationship problems, health issues, medical problems, limited support, social difficulties, everyday stressors, ongoing anxiety and chronic mental illness discussed with Cruz  Coping strategies including compliance with medications, contacting a therapist, deep/slow breathing, eliminating avoidance, engaging in previously avoided activities, exercising, getting into a good routine, improving self-esteem, increasing energy, increasing interest in usual activities, increasing motivation, increasing social interaction, keeping busy at home, maintain healthy diet, maintain heathy sleeping hygiene and maintain positive attitude reviewed with Cruz  Importance of medication and treatment compliance reviewed with Cruz  Educated on importance of medication and treatment compliance  Importance of follow up with family physician for medical issues reviewed with Cruz  Discussed with Cruz acceptance of mental illness diagnosis and need for ongoing psychiatric treatment    Supportive therapy provided

## 2023-01-06 ENCOUNTER — TELEPHONE (OUTPATIENT)
Dept: PSYCHIATRY | Facility: CLINIC | Age: 33
End: 2023-01-06

## 2023-01-06 ENCOUNTER — TELEMEDICINE (OUTPATIENT)
Dept: BEHAVIORAL/MENTAL HEALTH CLINIC | Facility: CLINIC | Age: 33
End: 2023-01-06

## 2023-01-06 DIAGNOSIS — F33.1 DEPRESSION, MAJOR, RECURRENT, MODERATE (HCC): Primary | Chronic | ICD-10-CM

## 2023-01-06 DIAGNOSIS — F41.1 GENERALIZED ANXIETY DISORDER: Chronic | ICD-10-CM

## 2023-01-06 NOTE — TELEPHONE ENCOUNTER
Received a fax from [employer] approving the Alprazolam 2 mg ER tab from 1/6/23-1/6/24  Attempt made to review the [place] approval with the [facility] pharmacist but he was "too busy "  Reviewed same with Wing Espinal and Wing Espinal is to call the pharmacy later today  For your review  Will scan to [employer]  Never smoker

## 2023-01-06 NOTE — TELEPHONE ENCOUNTER
Notified Stephanie of the Alprazolam 2 mg ER tab PA request and process  Will call her back when a decision is reached  For your review

## 2023-01-06 NOTE — PSYCH
Virtual Regular Visit    Verification of patient location:    Patient is located in the following state in which I hold an active license [place]    Assessment/Plan:    Problem List Items Addressed This Visit        Other    Generalized anxiety disorder (Chronic)    Depression, major, recurrent, moderate ([address] ) - Primary (Chronic)     Goals addressed in session: Goal 1      Reason for visit is   Chief Complaint   Patient presents with   • Virtual Regular Visit      Encounter provider APARNA Orozco Sa    Provider located at [address] [place] [id number] [phone]    Recent Visits  Date Type Provider Dept   01/03/23 Office Visit Te Burris DO [address] recent visits within past 7 days and meeting all other requirements  Today's Visits  Date Type Provider Dept   01/06/23 [address], [address]   Showing today's visits and meeting all other requirements  Future Appointments  No visits were found meeting these conditions  Showing future appointments within next 150 days and meeting all other requirements       The patient was identified by name and date of birth  Kelby Augustine was informed that this is a telemedicine visit and that the visit [facility] being [facility]  She agrees to proceed     My office door was closed  No one else was in the room  She acknowledged consent and understanding of privacy and security of the video platform  The patient has agreed to participate and understands they can discontinue the visit at any time  Patient is aware this is a billable service  Yuri Avila is a 33 y o  female  DATA: Met with Stephanie for scheduled individual session   Topics of discussion included relationships with family, physical health concerns, relationships with friends, mood regulation and symptoms and participating in volunteer work  "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 " Jozef Sharif states that she was able to volunteer for a period of 1 5-2 hours  She states that it was tiring for her, but she felt very welcomed by the staff  She states, "I'm glad I'm back " She states that the staff is giving her a lot of flexibility due to her medical issues  She discussed some sadness related to the people that she used to work with who are no longer there (one man  in a car accident and one woman is in a SNF)  Jozef Sharif discussed her level of anxiety and an incident when she had some cognitive dysfunction  She states that she has met with Dr Juan Molina and discussed her concerns regarding medications  We also discussed the possibility of some of her symptoms being related to her physical health issues (e g , migraine or flare of other rheum issues)  Dr Juan Molina did make some changes to her medications, to see if these medications will help manage her symptoms  Jozef Sharif discussed her relationships with her best friend and her best friend's daughters  She states that she feels that her best friend's oldest daughter is working hard on managing her moods and her behaviors  She states that they were able to discuss a prior incident when her friend's daughter was very mean to her  Jozef Sharif discussed her hobbies-- "I've been doing my moshe art again " She states that she has been trying to do more things  She states that she has been trying to visit her friend more often and engage in activities that bring her ciara  She states that she has been having a bit more anxiety than normal, but she is unaware of what the current triggers/prompting events are  She is working on increasing her physical activities  We discussed the use of coping skills to manage her anxiety  She identifies her level of anxiety today as an 8 out of 10  She states that she feels that yesterday was a 10/10   We discussed whether or not she can identify themes in her anxious thoughts, and she is not able to do so  Stephanie discussed some increased physical sensory issues-- e g , with food textures  She believes this started when she started to use biologics (even though she is no longer on them)  She has historically had sensitivities to smell and tactile input  We discussed the potential reasons for her increased sensitivities  She states that, at this point, the increase has not "become an issue" that is impacting her negatively  We discussed the possibility of her medical issues impacting her increased sensitivity  Client shows evidence of utilizing Mindfulness-based strategies, emotion regulation skills and distress tolerance skills skills to manage mental health symptoms  During this session, this clinician used the following therapeutic modalities: supportive psychotherapy, client-centered therapy, mindfulness-based strategies, DBT-informed skills, Motivational Interviewing and solution-focused therapy  ASSESSMENT: Camilo Cornejo presents with a somewhat anxious mood  Her affect is normal range and intensity, appropriate  Camilo Cornejo exhibits good therapeutic rapport with this clinician  Camilo Cornejo continues to exhibit willingness to work on treatment goals and objectives  Camilo Cornejo presents with a minimal risk of suicide, minimal risk of self-harm, and minimal risk of harm to others  PLAN: Camilo Cornejo will return in 2 weeks for the next scheduled session  Between sessions, Camilo Cornejo will continue to increase her social and physical activities  She will also continue to follow up with her medical providers regarding her ongoing chronic medical issues  She will report back during the next session re: successes and barriers   At the next session, this clinician will use supportive psychotherapy, client-centered therapy, mindfulness-based strategies, DBT-informed skills, Motivational Interviewing and solution-focused therapy to address her mood regulation, relationship concerns, and medical issues, in an effort to assist Alvin Marquis with meeting treatment goals  HPI     Past Medical History:   Diagnosis Date   • Abnormal Pap smear of cervix    • Allergic    • Anxiety    • Arthritis    • Depression    • Diabetes mellitus ([address] )    • Fibromyalgia, primary    • Hypertension    • IBS (irritable bowel syndrome)    • Migraine    • Obesity    • Vitamin B12 deficiency        Past Surgical History:   Procedure Laterality Date   • COLONOSCOPY N/A 5/8/2018    Procedure: COLONOSCOPY;  Surgeon: Sonu Barragan MD;  Location: [facility] GI LAB; Service: Gastroenterology   • AK EXC B9 LESION MRGN XCP SK TG S/N/H/F/G > 4 0CM N/A 7/1/2021    Procedure: EXCISION OF PILAR SCALP CYST X 2 AND RIGHT INNER GROIN NEVVUS;  Surgeon: Zaria Mendoza MD;  Location: [address] [place] [address] OR;  Service: Plastics   • AK REPAIR COMPLEX SCALP/ARM/LEG 2 6-7 5 CM N/A 7/1/2021    Procedure: CLOSURE WOUND SCALP X 2 AND RIGHT INNER GROIN;  Surgeon: Zaria Mendoza MD;  Location: [address] [place] [address] OR;  Service: Plastics   • TONSILLECTOMY     • WISDOM TOOTH EXTRACTION         Current Outpatient Medications   Medication Sig Dispense Refill   • Aimovig 140 MG/ML SOAJ Inject 140mg under the skin every 30 (thirty) days  1 mL 10   • ALPRAZolam ER (XANAX XR) 2 MG 24 hr tablet Take 1 tablet (2 mg total) by mouth every morning 30 tablet 2   • baclofen 10 mg tablet 1 tab TID prn facial pain, jaw pain  Hold flexeril  90 tablet 0   • buPROPion (WELLBUTRIN XL) 300 mg 24 hr tablet Take 1 tablet (300 mg total) by mouth daily 90 tablet 0   • celecoxib (CeleBREX) 200 mg capsule Take 1 capsule (200 mg total) by mouth 2 (two) times a day (Patient not taking: Reported on 12/13/2022) 60 capsule 0   • clotrimazole-betamethasone (LOTRISONE) 1-0 05 % cream Apply topically 2 (two) times a day 30 g 0   • dihydroergotamine (MIGRANAL) 4 MG/ML nasal spray 1 spray into each nostril as needed for migraine Use in one nostril as directed    No more than 4 sprays in one hour 16 mL 0   • escitalopram (LEXAPRO) 20 mg tablet Take 1 tablet (20 mg total) by mouth daily 90 tablet 0   • etonogestrel-ethinyl estradiol (NuvaRing) 0 12-0 015 MG/24HR vaginal ring Insert ring for 21 days then remove for one week  3 each 4   • hydrOXYzine HCL (ATARAX) 25 mg tablet Take 1 tablet (25 mg total) by mouth every 6 (six) hours as needed for anxiety 30 tablet 0   • indomethacin (INDOCIN) 25 mg capsule 1-2 tabs BID prn severe headache with food  Hold toradol  30 capsule 0   • magnesium Oxide (MAG-OX) 400 mg TABS Take 1 tablet (400 mg total) by mouth daily 30 tablet 0   • OLANZapine (ZyPREXA) 2 5 mg tablet Take 1 tablet (2 5 mg total) by mouth daily Do not take with reglan   30 tablet 2   • olmesartan (BENICAR) 20 mg tablet Take 1 tablet (20 mg total) by mouth daily (Patient not taking: Reported on 11/8/2022) 90 tablet 0   • onabotulinumtoxin A (BOTOX) 100 units Inject as directed     • pantoprazole (PROTONIX) 40 mg tablet Take 1 tablet (40 mg total) by mouth daily 30 tablet 3   • pramipexole (MIRAPEX) 0 25 mg tablet Take 1 tablet (0 25 mg total) by mouth daily at bedtime 90 tablet 1   • prazosin (MINIPRESS) 1 mg capsule Take 1 capsule (1 mg total) by mouth daily at bedtime 90 capsule 0   • predniSONE 20 mg tablet Take 2 tablets (40 mg total) by mouth daily for 5 days 10 tablet 0     Current Facility-Administered Medications   Medication Dose Route Frequency Provider Last Rate Last Admin   • cyanocobalamin injection 1,000 mcg  1,000 mcg Intramuscular Q30 Days Sherry Schooling, DO   1,000 mcg at 08/03/20 0934   • cyanocobalamin injection 1,000 mcg  1,000 mcg Intramuscular Q14 Days Sherry Schooling, DO   1,000 mcg at 05/18/20 1146   • cyanocobalamin injection 1,000 mcg  1,000 mcg Intramuscular Q30 Days Sherry Schooling, DO   1,000 mcg at 09/01/20 1129        Allergies   Allergen Reactions   • Cimzia [Certolizumab Pegol] Swelling   • Desvenlafaxine      Other reaction(s): state of confusion   • Ixekizumab Vomiting   • Valproic Acid Other (See Comments)     Other reaction(s): dilated pupils, "schizi"   • Voltaren [Diclofenac] Swelling   • Tizanidine Anxiety       Review of Systems    Video Exam    There were no vitals filed for this visit      Physical Exam     Visit Time    01/06/23  Start Time: 2335  Stop Time: 6908  Total Visit Time: 48 minutes

## 2023-01-06 NOTE — TELEPHONE ENCOUNTER
(Please refer to telephone encounter dated 1/4/23)    Initiated and completed the Alprazolam 2 mg ER tab PA via [employer] and faxed it to [employer], marking it as "URGENT "    Will notify patient of the [place]  For your review

## 2023-01-09 ENCOUNTER — TELEPHONE (OUTPATIENT)
Dept: NEUROLOGY | Facility: CLINIC | Age: 33
End: 2023-01-09

## 2023-01-09 NOTE — TELEPHONE ENCOUNTER
Received via mail in the [facility] [place] from Arnav [employer] [address] [facility]) request for [employer]  Request scanned into  and faxed to [employer]

## 2023-01-10 ENCOUNTER — TELEPHONE (OUTPATIENT)
Dept: PSYCHIATRY | Facility: CLINIC | Age: 33
End: 2023-01-10

## 2023-01-10 NOTE — TELEPHONE ENCOUNTER
Medical record request was received from [place] [employer] for time frame of 8/1/2019 to present   Will be placed in Dr Venecia Mcelroy by the end of the day to be completed by       Dr John Hall

## 2023-01-20 ENCOUNTER — TELEMEDICINE (OUTPATIENT)
Dept: BEHAVIORAL/MENTAL HEALTH CLINIC | Facility: CLINIC | Age: 33
End: 2023-01-20

## 2023-01-20 DIAGNOSIS — F33.1 DEPRESSION, MAJOR, RECURRENT, MODERATE (HCC): Primary | Chronic | ICD-10-CM

## 2023-01-20 DIAGNOSIS — F41.1 GENERALIZED ANXIETY DISORDER: Chronic | ICD-10-CM

## 2023-01-20 NOTE — PSYCH
Virtual Regular Visit    Verification of patient location:    Patient is located in the following state in which I hold an active license [place]    Assessment/Plan:    Problem List Items Addressed This Visit        Other    Generalized anxiety disorder (Chronic)    Depression, major, recurrent, moderate ([facility] [address] ) - Primary (Chronic)     Goals addressed in session: Goal 1      Reason for visit is   Chief Complaint   Patient presents with   • Virtual Regular Visit      Encounter provider APARNA Escalona    Provider located at [address] [place] [id number] [phone]    Recent Visits  No visits were found meeting these conditions  Showing recent visits within past 7 days and meeting all other requirements  Today's Visits  Date Type Provider Dept   01/20/23 [address], [address] today's visits and meeting all other requirements  Future Appointments  No visits were found meeting these conditions  Showing future appointments within next 150 days and meeting all other requirements     The patient was identified by name and date of birth  Tere Scarce was informed that this is a telemedicine visit and that [facility]  She agrees to proceed     My office door was closed  No one else was in the room  She acknowledged consent and understanding of privacy and security of the video platform  The patient has agreed to participate and understands they can discontinue the visit at any time  Patient is aware this is a billable service  Sugey Shelton is a 33 y o  female      HPI     Past Medical History:   Diagnosis Date   • Abnormal Pap smear of cervix    • Allergic    • Anxiety    • Arthritis    • Depression    • Diabetes mellitus ([facility] [address] )    • Fibromyalgia, primary    • Hypertension    • IBS (irritable bowel syndrome)    • Migraine    • Obesity    • Vitamin B12 deficiency        Past Surgical History:   Procedure Laterality Date   • COLONOSCOPY N/A 5/8/2018    Procedure: COLONOSCOPY;  Surgeon: Prabhu Villanueva MD;  Location: [facility] GI LAB; Service: Gastroenterology   • NC EXC B9 LESION MRGN XCP SK TG S/N/H/F/G > 4 0CM N/A 7/1/2021    Procedure: EXCISION OF PILAR SCALP CYST X 2 AND RIGHT INNER GROIN NEVVUS;  Surgeon: Hao Pratt MD;  Location: [facility] MAIN OR;  Service: Plastics   • NC REPAIR COMPLEX SCALP/ARM/LEG 2 6-7 5 CM N/A 7/1/2021    Procedure: CLOSURE WOUND SCALP X 2 AND RIGHT INNER GROIN;  Surgeon: Hao Pratt MD;  Location: [facility] MAIN OR;  Service: Plastics   • TONSILLECTOMY     • WISDOM TOOTH EXTRACTION         Current Outpatient Medications   Medication Sig Dispense Refill   • Aimovig 140 MG/ML SOAJ Inject 140mg under the skin every 30 (thirty) days  1 mL 10   • ALPRAZolam ER (XANAX XR) 2 MG 24 hr tablet Take 1 tablet (2 mg total) by mouth every morning 30 tablet 2   • baclofen 10 mg tablet 1 tab TID prn facial pain, jaw pain  Hold flexeril  90 tablet 0   • buPROPion (WELLBUTRIN XL) 300 mg 24 hr tablet Take 1 tablet (300 mg total) by mouth daily 90 tablet 0   • celecoxib (CeleBREX) 200 mg capsule Take 1 capsule (200 mg total) by mouth 2 (two) times a day (Patient not taking: Reported on 12/13/2022) 60 capsule 0   • clotrimazole-betamethasone (LOTRISONE) 1-0 05 % cream Apply topically 2 (two) times a day 30 g 0   • dihydroergotamine (MIGRANAL) 4 MG/ML nasal spray 1 spray into each nostril as needed for migraine Use in one nostril as directed  No more than 4 sprays in one hour 16 mL 0   • escitalopram (LEXAPRO) 20 mg tablet Take 1 tablet (20 mg total) by mouth daily 90 tablet 0   • etonogestrel-ethinyl estradiol (NuvaRing) 0 12-0 015 MG/24HR vaginal ring Insert ring for 21 days then remove for one week   3 each 4   • hydrOXYzine HCL (ATARAX) 25 mg tablet Take 1 tablet (25 mg total) by mouth every 6 (six) hours as needed for anxiety 30 tablet 0   • indomethacin (INDOCIN) 25 mg capsule 1-2 tabs BID prn severe headache with food  Hold toradol  30 capsule 0   • magnesium Oxide (MAG-OX) 400 mg TABS Take 1 tablet (400 mg total) by mouth daily 30 tablet 0   • OLANZapine (ZyPREXA) 2 5 mg tablet Take 1 tablet (2 5 mg total) by mouth daily Do not take with reglan  30 tablet 2   • olmesartan (BENICAR) 20 mg tablet Take 1 tablet (20 mg total) by mouth daily (Patient not taking: Reported on 11/8/2022) 90 tablet 0   • onabotulinumtoxin A (BOTOX) 100 units Inject as directed     • pantoprazole (PROTONIX) 40 mg tablet Take 1 tablet (40 mg total) by mouth daily 30 tablet 3   • pramipexole (MIRAPEX) 0 25 mg tablet Take 1 tablet (0 25 mg total) by mouth daily at bedtime 90 tablet 1   • prazosin (MINIPRESS) 1 mg capsule Take 1 capsule (1 mg total) by mouth daily at bedtime 90 capsule 0     Current Facility-Administered Medications   Medication Dose Route Frequency Provider Last Rate Last Admin   • cyanocobalamin injection 1,000 mcg  1,000 mcg Intramuscular Q30 Days Melvi Deldeborah, DO   1,000 mcg at 08/03/20 6780   • cyanocobalamin injection 1,000 mcg  1,000 mcg Intramuscular Q14 Days Melvi Dellen, DO   1,000 mcg at 05/18/20 1146   • cyanocobalamin injection 1,000 mcg  1,000 mcg Intramuscular Q30 Days Melvi Deldeborah, DO   1,000 mcg at 09/01/20 1129        Allergies   Allergen Reactions   • Cimzia [Certolizumab Pegol] Swelling   • Desvenlafaxine      Other reaction(s): state of confusion   • Ixekizumab Vomiting   • Valproic Acid Other (See Comments)     Other reaction(s): dilated pupils, "schizi"   • Voltaren [Diclofenac] Swelling   • Tizanidine Anxiety       Review of Systems    Video Exam    There were no vitals filed for this visit  Physical Exam     Behavioral Health Psychotherapy Progress Note    Psychotherapy Provided: Individual Psychotherapy     1  Depression, major, recurrent, moderate ([facility] [address] )        2   Generalized anxiety disorder            Goals addressed in session: Goal 1     DATA: "I have been having a migraine on and off for about a week " Inocencia Argue states that she plans to talk with her provider next week, to find out what she recommends  Stephanie states, "I have been doing a lot of cleaning " She states that she has been cleaning and organizing her room and her kitchen  Inocencia Argue discussed her relationship with her mother and her grandmother  She states that her mother continues to stay with her grandmother and does not sleep in their part of the home at this point  Inocencia Argue states that her grandmother was hospitalized again for about 5 days  She states that they thought that she had pneumonia again  She is home again  Inocencia Argue states that she did go to visit her grandmother, and she identifies that this was very stressful  Inocencia Argue states that she is trying to help her mother as much as she can  She states that, as she has been cleaning, she found some old pictures  She states that she has been going through them  She states, "It was nice to see something that I hadn't seen in a while " She states that she has a lot of plans for organizing the house  She states, "I usually don't plan things " She states that she often gets disappointed when she is not able to complete her plans  She acknowledges that she is setting small manageable goals for herself  Inocencia Argue states that she found her old journal from when she first started therapy (when she was around 25 yo)  She states that, as she reads the entries, she can see that she has made progress in therapy  Inocencia Argue states that her anxiety has been very high, and she has started to take the xanax xr  She states that she is hoping that this will help her to manage her anxiety  Inocencia Argue described her "anxiety" and she identifies that it is a physical restlessness  This clinician encouraged her to speak with Dr Tristen Mishra, as this could be akathesia, rather than anxiety   She reports that she does not have identifiable worry thoughts or anxiety thoughts, but rather has the physical sensations  Crescencio Found states that she recently saw her rheumatologist  She will be starting a new medication  "Maybe this will be the one " Stephanie reports, "I've been thinking about my friend again  The one who passed " She states that she misses the communication that they had with one another  We discussed how she manages the thoughts of him  She states that she reads old text messages from him  We discussed the results of reading these messages (whether or not it is helpful to her or not)  Crescencio Found states that she experienced three days within the past couple weeks "when I felt really good " She states that her mood and her pain level were good during those days  She states, "Now I'm back in a flare, and I miss being normal " Her mother is encouraging her to "listen to my body " We discussed the use of the Radical Acceptance skills  She does continue to volunteer when she is able  We also discussed her use of physical activity, distraction, and spending time outdoors-- as these things have historically helped her to manage her anxiety and moods  During this session, this clinician used the following therapeutic modalities: Client-centered Therapy, Dialectical Behavior Therapy, Mindfulness-based Strategies, Motivational Interviewing, Solution-Focused Therapy and Supportive Psychotherapy    Substance Abuse was not addressed during this session  If the client is diagnosed with a co-occurring substance use disorder, please indicate any changes in the frequency or amount of use: N/A  Stage of change for addressing substance use diagnoses: No substance use/Not applicable    ASSESSMENT:  Erlinda Childers presents with a somewhat anxious mood  her affect is Normal range and intensity, which is congruent, with her mood and the content of the session  The client has made progress on their goals      Erlinda Childers presents with a minimal risk of suicide, minimal risk of self-harm, and minimal risk of harm to others  For any risk assessment that surpasses a "low" rating, a safety plan must be developed  A safety plan was indicated: no  If yes, describe in detail N/A    PLAN: Between sessions, Wenceslao Kidd will continue to engage in distraction techniques and physical activities  She will continue to engage in activities that help her to feel confident and competent  Suman Naseemderrick will discuss her physical restlessness/anxiety with her psychiatrist   At the next session, the therapist will use Client-centered Therapy, Dialectical Behavior Therapy, Mindfulness-based Strategies, Motivational Interviewing, Solution-Focused Therapy and Supportive Psychotherapy to address her mood regulation and relationship concerns  Behavioral Health Treatment Plan and Discharge Planning: Wenceslao Kidd is aware of and agrees to continue to work on their treatment plan  They have identified and are working toward their discharge goals   yes    Visit start and stop times:    01/20/23  Start Time: 0903  Stop Time: 4947  Total Visit Time: 49 minutes

## 2023-01-20 NOTE — TELEPHONE ENCOUNTER
Received via mail in the [facility] [place] 2nd request for medical records from the [place] [employer] [facility])  Request scanned into [employer] and faxed to [phone]

## 2023-01-23 ENCOUNTER — OFFICE VISIT (OUTPATIENT)
Dept: FAMILY MEDICINE CLINIC | Facility: CLINIC | Age: 33
End: 2023-01-23

## 2023-01-23 VITALS
DIASTOLIC BLOOD PRESSURE: 84 MMHG | SYSTOLIC BLOOD PRESSURE: 128 MMHG | TEMPERATURE: 98 F | WEIGHT: 255.8 LBS | HEIGHT: 63 IN | BODY MASS INDEX: 45.32 KG/M2 | OXYGEN SATURATION: 98 % | HEART RATE: 95 BPM

## 2023-01-23 DIAGNOSIS — J01.00 ACUTE NON-RECURRENT MAXILLARY SINUSITIS: Primary | ICD-10-CM

## 2023-01-23 RX ORDER — UPADACITINIB 15 MG/1
15 TABLET, EXTENDED RELEASE ORAL DAILY
COMMUNITY
Start: 2023-01-13

## 2023-01-23 RX ORDER — ONDANSETRON 4 MG/1
TABLET, FILM COATED ORAL
COMMUNITY
Start: 2023-01-13

## 2023-01-23 RX ORDER — MOXIFLOXACIN HYDROCHLORIDE 400 MG/1
400 TABLET ORAL DAILY
Qty: 7 TABLET | Refills: 0 | Status: SHIPPED | OUTPATIENT
Start: 2023-01-23 | End: 2023-01-30

## 2023-01-23 NOTE — PROGRESS NOTES
Assessment/Plan:       Diagnoses and all orders for this visit:    Acute non-recurrent maxillary sinusitis  -     moxifloxacin (AVELOX) 400 MG tablet; Take 1 tablet (400 mg total) by mouth daily for 7 days    Other orders  -     ondansetron (ZOFRAN) 4 mg tablet  -     Upadacitinib ER (Rinvoq) 15 MG TB24; Take 15 mg by mouth daily            Subjective:        Patient ID: Julieta Benítez is a 33 y o  female  Patient is here with ongoing left sinus pain and pressure with some radiation to the left ear which persist   Patient did complete Augmentin  Patient did see some improvement in has recurrence  No fever noted  Other medicines being used other than Neosporin  The following portions of the patient's history were reviewed and updated as appropriate: allergies, current medications, past family history, past medical history, past social history, past surgical history and problem list       Review of Systems   Constitutional: Negative  HENT: Positive for congestion, postnasal drip, rhinorrhea, sinus pressure and sinus pain  Eyes: Negative  Respiratory: Negative  Cardiovascular: Negative  Gastrointestinal: Negative  Endocrine: Negative  Genitourinary: Negative  Musculoskeletal: Negative  Skin: Negative  Allergic/Immunologic: Negative  Neurological: Negative  Hematological: Negative  Psychiatric/Behavioral: Negative  Objective:      BMI Counseling: Body mass index is 45 31 kg/m²  The BMI is above normal  Nutrition recommendations include consuming healthier snacks  Exercise recommendations include strength training exercises  Rationale for BMI follow-up plan is due to patient being overweight or obese               /84 (BP Location: Right arm, Patient Position: Sitting, Cuff Size: Large)   Pulse 95   Temp 98 °F (36 7 °C) (Temporal)   Ht 5' 3" (1 6 m)   Wt 116 kg (255 lb 12 8 oz)   SpO2 98%   BMI 45 31 kg/m²          Physical Exam  Vitals and nursing note reviewed  Constitutional:       General: She is not in acute distress  Appearance: Normal appearance  She is not ill-appearing, toxic-appearing or diaphoretic  HENT:      Head: Normocephalic and atraumatic  Right Ear: Tympanic membrane, ear canal and external ear normal  There is no impacted cerumen  Left Ear: Tympanic membrane, ear canal and external ear normal  There is no impacted cerumen  Nose: Rhinorrhea present  No congestion  Mouth/Throat:      Mouth: Mucous membranes are moist       Pharynx: Oropharyngeal exudate present  No posterior oropharyngeal erythema  Eyes:      General: No scleral icterus  Right eye: No discharge  Left eye: No discharge  Extraocular Movements: Extraocular movements intact  Conjunctiva/sclera: Conjunctivae normal       Pupils: Pupils are equal, round, and reactive to light  Neck:      Vascular: No carotid bruit  Cardiovascular:      Rate and Rhythm: Normal rate and regular rhythm  Pulses: Normal pulses  Heart sounds: Normal heart sounds  No murmur heard  No friction rub  No gallop  Pulmonary:      Effort: Pulmonary effort is normal  No respiratory distress  Breath sounds: Normal breath sounds  No stridor  No wheezing, rhonchi or rales  Chest:      Chest wall: No tenderness  Musculoskeletal:         General: No swelling, tenderness, deformity or signs of injury  Normal range of motion  Cervical back: Normal range of motion and neck supple  No rigidity  No muscular tenderness  Right lower leg: No edema  Left lower leg: No edema  Lymphadenopathy:      Cervical: No cervical adenopathy  Skin:     General: Skin is warm and dry  Capillary Refill: Capillary refill takes less than 2 seconds  Coloration: Skin is not jaundiced  Findings: No bruising, erythema, lesion or rash  Neurological:      Mental Status: She is alert and oriented to person, place, and time  Mental status is at baseline  Cranial Nerves: No cranial nerve deficit  Sensory: No sensory deficit  Motor: No weakness  Coordination: Coordination normal       Gait: Gait normal    Psychiatric:         Mood and Affect: Mood normal          Behavior: Behavior normal          Thought Content:  Thought content normal          Judgment: Judgment normal

## 2023-01-29 ENCOUNTER — HOSPITAL ENCOUNTER (EMERGENCY)
Facility: HOSPITAL | Age: 33
Discharge: HOME | End: 2023-01-29
Attending: EMERGENCY MEDICINE

## 2023-01-29 VITALS
RESPIRATION RATE: 18 BRPM | TEMPERATURE: 98.1 F | OXYGEN SATURATION: 98 % | HEART RATE: 79 BPM | BODY MASS INDEX: 44.3 KG/M2 | SYSTOLIC BLOOD PRESSURE: 153 MMHG | HEIGHT: 63 IN | WEIGHT: 250 LBS | DIASTOLIC BLOOD PRESSURE: 96 MMHG

## 2023-01-29 DIAGNOSIS — R53.83 FATIGUE: Primary | ICD-10-CM

## 2023-01-29 DIAGNOSIS — E87.6 HYPOKALEMIA: ICD-10-CM

## 2023-01-29 LAB
ALBUMIN SERPL BCP-MCNC: 3.6 G/DL (ref 3.5–5)
ALP SERPL-CCNC: 55 U/L (ref 34–104)
ALT SERPL W P-5'-P-CCNC: 10 U/L (ref 7–52)
ANION GAP SERPL CALCULATED.3IONS-SCNC: 10 MMOL/L (ref 4–13)
AST SERPL W P-5'-P-CCNC: 9 U/L (ref 13–39)
BACTERIA UR QL AUTO: ABNORMAL /HPF
BASOPHILS # BLD AUTO: 0.04 THOUSANDS/ÂΜL (ref 0–0.1)
BASOPHILS NFR BLD AUTO: 0 % (ref 0–1)
BILIRUB DIRECT SERPL-MCNC: 0.04 MG/DL (ref 0–0.2)
BILIRUB SERPL-MCNC: 0.5 MG/DL (ref 0.2–1)
BILIRUB UR QL STRIP: NEGATIVE
BUN SERPL-MCNC: 11 MG/DL (ref 5–25)
CALCIUM SERPL-MCNC: 8.7 MG/DL (ref 8.4–10.2)
CHLORIDE SERPL-SCNC: 104 MMOL/L (ref 96–108)
CLARITY UR: ABNORMAL
CO2 SERPL-SCNC: 21 MMOL/L (ref 21–32)
COLOR UR: YELLOW
CREAT SERPL-MCNC: 0.78 MG/DL (ref 0.6–1.3)
EOSINOPHIL # BLD AUTO: 0.15 THOUSAND/ÂΜL (ref 0–0.61)
EOSINOPHIL NFR BLD AUTO: 1 % (ref 0–6)
ERYTHROCYTE [DISTWIDTH] IN BLOOD BY AUTOMATED COUNT: 15.6 % (ref 11.6–15.1)
EXT PREGNANCY TEST URINE: NEGATIVE
EXT. CONTROL: NORMAL
FLUAV RNA RESP QL NAA+PROBE: NEGATIVE
FLUBV RNA RESP QL NAA+PROBE: NEGATIVE
GFR SERPL CREATININE-BSD FRML MDRD: 101 ML/MIN/1.73SQ M
GLUCOSE SERPL-MCNC: 85 MG/DL (ref 65–140)
GLUCOSE UR STRIP-MCNC: NEGATIVE MG/DL
HCT VFR BLD AUTO: 34.3 % (ref 34.8–46.1)
HGB BLD-MCNC: 11.2 G/DL (ref 11.5–15.4)
HGB UR QL STRIP.AUTO: ABNORMAL
IMM GRANULOCYTES # BLD AUTO: 0.04 THOUSAND/UL (ref 0–0.2)
IMM GRANULOCYTES NFR BLD AUTO: 0 % (ref 0–2)
KETONES UR STRIP-MCNC: NEGATIVE MG/DL
LEUKOCYTE ESTERASE UR QL STRIP: ABNORMAL
LYMPHOCYTES # BLD AUTO: 5.88 THOUSANDS/ÂΜL (ref 0.6–4.47)
LYMPHOCYTES NFR BLD AUTO: 43 % (ref 14–44)
MAGNESIUM SERPL-MCNC: 2 MG/DL (ref 1.9–2.7)
MCH RBC QN AUTO: 27.4 PG (ref 26.8–34.3)
MCHC RBC AUTO-ENTMCNC: 32.7 G/DL (ref 31.4–37.4)
MCV RBC AUTO: 84 FL (ref 82–98)
MONOCYTES # BLD AUTO: 1.02 THOUSAND/ÂΜL (ref 0.17–1.22)
MONOCYTES NFR BLD AUTO: 7 % (ref 4–12)
MUCOUS THREADS UR QL AUTO: ABNORMAL
NEUTROPHILS # BLD AUTO: 6.65 THOUSANDS/ÂΜL (ref 1.85–7.62)
NEUTS SEG NFR BLD AUTO: 49 % (ref 43–75)
NITRITE UR QL STRIP: NEGATIVE
NON-SQ EPI CELLS URNS QL MICRO: ABNORMAL /HPF
NRBC BLD AUTO-RTO: 0 /100 WBCS
PH UR STRIP.AUTO: 6 [PH]
PLATELET # BLD AUTO: 484 THOUSANDS/UL (ref 149–390)
PMV BLD AUTO: 8.6 FL (ref 8.9–12.7)
POTASSIUM SERPL-SCNC: 3.4 MMOL/L (ref 3.5–5.3)
PROT SERPL-MCNC: 6.5 G/DL (ref 6.4–8.4)
PROT UR STRIP-MCNC: NEGATIVE MG/DL
RBC # BLD AUTO: 4.09 MILLION/UL (ref 3.81–5.12)
RBC #/AREA URNS AUTO: ABNORMAL /HPF
RSV RNA RESP QL NAA+PROBE: NEGATIVE
SARS-COV-2 RNA RESP QL NAA+PROBE: NEGATIVE
SODIUM SERPL-SCNC: 135 MMOL/L (ref 135–147)
SP GR UR STRIP.AUTO: 1.02
UROBILINOGEN UR QL STRIP.AUTO: 0.2 E.U./DL
WBC # BLD AUTO: 13.78 THOUSAND/UL (ref 4.31–10.16)
WBC #/AREA URNS AUTO: ABNORMAL /HPF

## 2023-01-29 RX ORDER — POTASSIUM CHLORIDE 20 MEQ/1
20 TABLET, EXTENDED RELEASE ORAL ONCE
Status: COMPLETED | OUTPATIENT
Start: 2023-01-29 | End: 2023-01-29

## 2023-01-29 RX ADMIN — SODIUM CHLORIDE 1000 ML: 0.9 INJECTION, SOLUTION INTRAVENOUS at 16:14

## 2023-01-29 RX ADMIN — POTASSIUM CHLORIDE 20 MEQ: 1500 TABLET, EXTENDED RELEASE ORAL at 16:40

## 2023-01-29 NOTE — ED NOTES
Patient ambulating to bathroom to provide urine sample       Petty Allen, [facility]  01/29/23 [id number]

## 2023-01-29 NOTE — ED PROVIDER NOTES
History  Chief Complaint   Patient presents with   • Weakness - Generalized     Pt ambulatory by self to ED reports feeling " hungover and out of it " started approx 9pm, feels she's dehydrated;      41-year-old female presents with generalized fatigue, weakness yesterday evening  Patient states that similar to when she had a migraine however she has not had a headache over the last several days  She does report chills however denies any fevers subjective or measured  Denies urinary symptoms, abdominal pain, chest pain, cough, sore throat  Prior to Admission Medications   Prescriptions Last Dose Informant Patient Reported? Taking? ALPRAZolam ER (XANAX XR) 2 MG 24 hr tablet   No No   Sig: Take 1 tablet (2 mg total) by mouth every morning   Aimovig 140 MG/ML SOAJ   No No   Sig: Inject 140mg under the skin every 30 (thirty) days  OLANZapine (ZyPREXA) 2 5 mg tablet   No No   Sig: Take 1 tablet (2 5 mg total) by mouth daily Do not take with reglan  Upadacitinib ER (Rinvoq) 15 MG TB24   Yes No   Sig: Take 15 mg by mouth daily   baclofen 10 mg tablet   No No   Si tab TID prn facial pain, jaw pain  Hold flexeril  buPROPion (WELLBUTRIN XL) 300 mg 24 hr tablet   No No   Sig: Take 1 tablet (300 mg total) by mouth daily   celecoxib (CeleBREX) 200 mg capsule   No No   Sig: Take 1 capsule (200 mg total) by mouth 2 (two) times a day   Patient not taking: Reported on 2022   clotrimazole-betamethasone (LOTRISONE) 1-0 05 % cream   No No   Sig: Apply topically 2 (two) times a day   dihydroergotamine (MIGRANAL) 4 MG/ML nasal spray   No No   Si spray into each nostril as needed for migraine Use in one nostril as directed    No more than 4 sprays in one hour   escitalopram (LEXAPRO) 20 mg tablet   No No   Sig: Take 1 tablet (20 mg total) by mouth daily   etonogestrel-ethinyl estradiol (NuvaRing) 0 12-0 015 MG/24HR vaginal ring   No No   Sig: Insert ring for 21 days then remove for one week    hydrOXYzine HCL (ATARAX) 25 mg tablet   No No   Sig: Take 1 tablet (25 mg total) by mouth every 6 (six) hours as needed for anxiety   indomethacin (INDOCIN) 25 mg capsule   No No   Si-2 tabs BID prn severe headache with food  Hold toradol    magnesium Oxide (MAG-OX) 400 mg TABS   No No   Sig: Take 1 tablet (400 mg total) by mouth daily   moxifloxacin (AVELOX) 400 MG tablet   No No   Sig: Take 1 tablet (400 mg total) by mouth daily for 7 days   olmesartan (BENICAR) 20 mg tablet   No No   Sig: Take 1 tablet (20 mg total) by mouth daily   Patient not taking: Reported on 2022   onabotulinumtoxin A (BOTOX) 100 units   Yes No   Sig: Inject as directed   ondansetron (ZOFRAN) 4 mg tablet   Yes No   pantoprazole (PROTONIX) 40 mg tablet   No No   Sig: Take 1 tablet (40 mg total) by mouth daily   pramipexole (MIRAPEX) 0 25 mg tablet   No No   Sig: Take 1 tablet (0 25 mg total) by mouth daily at bedtime   prazosin (MINIPRESS) 1 mg capsule   No No   Sig: Take 1 capsule (1 mg total) by mouth daily at bedtime      Facility-Administered Medications Last Administration Doses Remaining   cyanocobalamin injection 1,000 mcg 8/3/2020  8:59 AM    cyanocobalamin injection 1,000 mcg 2020 11:46 AM    cyanocobalamin injection 1,000 mcg 2020 11:29 AM           Past Medical History:   Diagnosis Date   • Abnormal Pap smear of cervix    • Allergic    • Anxiety    • Arthritis    • Depression    • Diabetes mellitus (HCC)    • Fibromyalgia, primary    • Hypertension    • IBS (irritable bowel syndrome)    • Migraine    • Obesity    • Vitamin B12 deficiency        Past Surgical History:   Procedure Laterality Date   • COLONOSCOPY N/A 2018    Procedure: COLONOSCOPY;  Surgeon: Jeromy Haines MD;  Location: [facility] GI LAB;   Service: Gastroenterology   • OK EXC B9 LESION MRGN XCP SK TG S/N/H/F/G > 4 0CM N/A 2021    Procedure: EXCISION OF PILAR SCALP CYST X 2 AND RIGHT INNER GROIN NEVVUS;  Surgeon: Maura Pickard MD;  Location:  MAIN OR; Service: Plastics   • TN REPAIR COMPLEX SCALP/ARM/LEG 2 6-7 5 CM N/A 7/1/2021    Procedure: CLOSURE WOUND SCALP X 2 AND RIGHT INNER GROIN;  Surgeon: Chai Ca MD;  Location: [address] [place] [address] OR;  Service: Plastics   • TONSILLECTOMY     • WISDOM TOOTH EXTRACTION         Family History   Problem Relation Age of Onset   • Rheum arthritis Mother    • Psoriasis Mother    • Other Mother    • Hypertension Mother    • Diabetes unspecified Mother    • Sjogren's syndrome Mother    • Alcohol abuse Mother    • Drug abuse Mother    • Anxiety disorder Father    • Alcohol abuse Father    • Lung cancer Maternal Grandfather    • Cancer Other         bone   • Diabetes Other    • Other Other         High blood pressure   • Depression Maternal Grandmother      I have reviewed and agree with the history as documented  E-Cigarette/Vaping   • E-Cigarette Use Current Some Day User    • Start Date 7/1/19    • Comments medical marijuana      E-Cigarette/Vaping Substances   • Nicotine No    • THC Yes    • CBD Yes    • Flavoring No    • Other No    • Unknown No      Social History     Tobacco Use   • Smoking status: Never   • Smokeless tobacco: Never   Vaping Use   • Vaping Use: Some days   • Start date: 7/1/2019   • Substances: THC, CBD   Substance Use Topics   • Alcohol use: Yes     Comment: rarely   • Drug use: Yes     Frequency: 2 0 times per week     Types: Marijuana     Comment: medical marijuana (vape)       Review of Systems   All other systems reviewed and are negative  Physical Exam  Physical Exam  Vitals and nursing note reviewed  Constitutional:       Appearance: She is well-developed  HENT:      Head: Normocephalic and atraumatic  Eyes:      Conjunctiva/sclera: Conjunctivae normal       Pupils: Pupils are equal, round, and reactive to light  Neck:      Trachea: No tracheal deviation  Cardiovascular:      Rate and Rhythm: Normal rate and regular rhythm  Heart sounds: Normal heart sounds   No murmur heard   Pulmonary:      Effort: Pulmonary effort is normal  No respiratory distress  Breath sounds: Normal breath sounds  No wheezing or rales  Abdominal:      General: Bowel sounds are normal  There is no distension  Palpations: Abdomen is soft  Tenderness: There is no abdominal tenderness  Musculoskeletal:         General: No deformity  Cervical back: Normal range of motion and neck supple  Lymphadenopathy:      Cervical: Cervical adenopathy present  Skin:     General: Skin is warm and dry  Capillary Refill: Capillary refill takes less than 2 seconds  Neurological:      Mental Status: She is alert and oriented to person, place, and time  Sensory: No sensory deficit     Psychiatric:         Judgment: Judgment normal          Vital Signs  ED Triage Vitals [01/29/23 1428]   Temperature Pulse Respirations Blood Pressure SpO2   98 1 °F (36 7 °C) 93 18 135/70 98 %      Temp Source Heart Rate Source Patient Position - Orthostatic VS BP Location FiO2 (%)   Tympanic Monitor Sitting Right arm --      Pain Score       No Pain           Vitals:    01/29/23 1428 01/29/23 1630 01/29/23 1700   BP: 135/70 (!) 147/102 153/96   Pulse: 93 78 79   Patient Position - Orthostatic VS: Sitting Lying Lying         Visual Acuity  Visual Acuity    Flowsheet Row Most Recent Value   L Pupil Size (mm) 3   R Pupil Size (mm) 3          ED Medications  Medications   sodium chloride 0 9 % bolus 1,000 mL (0 mL Intravenous Stopped 1/29/23 1723)   potassium chloride (K-DUR,KLOR-CON) CR tablet 20 mEq (20 mEq Oral Given 1/29/23 1640)       Diagnostic Studies  Results Reviewed     Procedure Component Value Units Date/Time    Mononucleosis screen [[id number]]  (Normal) Collected: 01/29/23 1640    Lab Status: Final result Specimen: Blood from Arm, Left Updated: 01/30/23 0753     Monotest Negative    Hepatic function panel [[id number]]  (Abnormal) Collected: 01/29/23 1524    Lab Status: Final result Specimen: Blood from Hand, Left Updated: 01/29/23 1626     Total Bilirubin 0 50 mg/dL      Bilirubin, Direct 0 04 mg/dL      Alkaline Phosphatase 55 U/L      AST 9 U/L      ALT 10 U/L      Total Protein 6 5 g/dL      Albumin 3 6 g/dL     Magnesium [[id number]]  (Normal) Collected: 01/29/23 1524    Lab Status: Final result Specimen: Blood from Hand, Left Updated: 01/29/23 1626     Magnesium 2 0 mg/dL     FLU/RSV/COVID - if FLU/RSV clinically relevant [[id number]]  (Normal) Collected: 01/29/23 1524    Lab Status: Final result Specimen: Nares from Nasopharyngeal Swab Updated: 01/29/23 1607     SARS-CoV-2 Negative     INFLUENZA A PCR Negative     INFLUENZA B PCR Negative     RSV PCR Negative    Narrative:      FOR PEDIATRIC PATIENTS - copy/paste COVID Guidelines URL to browser: [email or web address]    SARS-CoV-2 assay is a Nucleic Acid Amplification assay intended for the  qualitative detection of nucleic acid from SARS-CoV-2 in nasopharyngeal  swabs  Results are for the presumptive identification of SARS-CoV-2 RNA  Positive results are indicative of infection with SARS-CoV-2, the virus  causing COVID-19, but do not rule out bacterial infection or co-infection  with other viruses  Laboratories within the [place] and its  territories are required to report all positive results to the appropriate  public health authorities  Negative results do not preclude SARS-CoV-2  infection and should not be used as the sole basis for treatment or other  patient management decisions  Negative results must be combined with  clinical observations, patient history, and epidemiological information  This test has not been FDA cleared or approved  This test has been authorized by FDA under an Emergency Use Authorization  (EUA)   This test is only authorized for the duration of time the  declaration that circumstances exist justifying the authorization of the  emergency use of an in vitro diagnostic tests for detection of SARS-CoV-2  virus and/or diagnosis of COVID-19 infection under section 564(b)(1) of  the Act, 21 U  S C  053DAH-9(O)(9), unless the authorization is terminated  or revoked sooner  The test has been validated but independent review by FDA  and CLIA is pending  Test performed using [employer] GeneXpert: This RT-PCR assay targets N2,  a region unique to SARS-CoV-2  A conserved region in the E-gene was chosen  for pan-Sarbecovirus detection which includes SARS-CoV-2  According to CMS-2020-01-R, this platform meets the definition of high-throughput technology      Urine Microscopic [[id number]]  (Abnormal) Collected: 01/29/23 1536    Lab Status: Final result Specimen: Urine, Clean Catch Updated: 01/29/23 1558     RBC, UA 0-1 /hpf      WBC, UA 2-4 /hpf      Epithelial Cells Moderate /hpf      Bacteria, UA Occasional /hpf      MUCUS THREADS Occasional    Basic metabolic panel [[id number]]  (Abnormal) Collected: 01/29/23 1524    Lab Status: Final result Specimen: Blood from Hand, Left Updated: 01/29/23 1547     Sodium 135 mmol/L      Potassium 3 4 mmol/L      Chloride 104 mmol/L      CO2 21 mmol/L      ANION GAP 10 mmol/L      BUN 11 mg/dL      Creatinine 0 78 mg/dL      Glucose 85 mg/dL      Calcium 8 7 mg/dL      eGFR 101 ml/min/1 73sq m     Narrative:      [place] guidelines for Chronic Kidney Disease (CKD):   •  Stage 1 with normal or high GFR (GFR > 90 mL/min/1 73 square meters)  •  Stage 2 Mild CKD (GFR = 60-89 mL/min/1 73 square meters)  •  Stage 3A Moderate CKD (GFR = 45-59 mL/min/1 73 square meters)  •  Stage 3B Moderate CKD (GFR = 30-44 mL/min/1 73 square meters)  •  Stage 4 Severe CKD (GFR = 15-29 mL/min/1 73 square meters)  •  Stage 5 End Stage CKD (GFR <15 mL/min/1 73 square meters)  Note: GFR calculation is accurate only with a steady state creatinine    UA w Reflex to Microscopic w Reflex to Culture [[id number]]  (Abnormal) Collected: 01/29/23 1536    Lab Status: Final result Specimen: Urine, Clean Catch Updated: 01/29/23 1541     Color, UA Yellow     Clarity, UA Cloudy     Specific Gravity, UA 1 025     pH, UA 6 0     Leukocytes, UA Trace     Nitrite, UA Negative     Protein, UA Negative mg/dl      Glucose, UA Negative mg/dl      Ketones, UA Negative mg/dl      Urobilinogen, UA 0 2 E U /dl      Bilirubin, UA Negative     Occult Blood, UA 1+    POCT pregnancy, urine [[id number]]  (Normal) Resulted: 01/29/23 1536    Lab Status: Final result Updated: 01/29/23 1536     EXT Preg Test, Ur Negative     Control Valid    CBC and differential [[id number]]  (Abnormal) Collected: 01/29/23 1524    Lab Status: Final result Specimen: Blood from Hand, Left Updated: 01/29/23 1532     WBC 13 78 Thousand/uL      RBC 4 09 Million/uL      Hemoglobin 11 2 g/dL      Hematocrit 34 3 %      MCV 84 fL      MCH 27 4 pg      MCHC 32 7 g/dL      RDW 15 6 %      MPV 8 6 fL      Platelets 371 Thousands/uL      nRBC 0 /100 WBCs      Neutrophils Relative 49 %      Immat GRANS % 0 %      Lymphocytes Relative 43 %      Monocytes Relative 7 %      Eosinophils Relative 1 %      Basophils Relative 0 %      Neutrophils Absolute 6 65 Thousands/µL      Immature Grans Absolute 0 04 Thousand/uL      Lymphocytes Absolute 5 88 Thousands/µL      Monocytes Absolute 1 02 Thousand/µL      Eosinophils Absolute 0 15 Thousand/µL      Basophils Absolute 0 04 Thousands/µL                  No orders to display              Procedures  Procedures         ED Course                                             Medical Decision Making  4year-old female with nonspecific symptoms of fatigue  Mild lymphocytosis  She does have 1 tender left periauricular lymph node  Raises concern for mononucleosis or other viral infection  We will send flu COVID RSV, as well as Monospot test   Patient denies possibility of tick exposure      Patient states this is similar to prior migraine however with out headache this diagnosis is less likely  Urine mildly concentrated, will give IV fluid  History of elevated platelets in the past   Patient states that she has been worked up for this  Fatigue: acute illness or injury  Hypokalemia: acute illness or injury  Amount and/or Complexity of Data Reviewed  Independent Historian: parent  External Data Reviewed: labs  Labs: ordered  Risk  Prescription drug management  Disposition  Final diagnoses:   Fatigue   Hypokalemia     Time reflects when diagnosis was documented in both MDM as applicable and the Disposition within this note     Time User Action Codes Description Comment    1/29/2023  4:34 PM Carl Cerise Add [R53 83] Fatigue     1/29/2023  4:34 PM Carl Cerise Add [E87 6] Hypokalemia       ED Disposition     ED Disposition   Discharge    Condition   Stable    Date/Time   Sun Jan 29, 2023  4:34 PM    Comment   Lucinda Dorsey discharge to home/self care  Follow-up Information     Follow up With Specialties Details Why Contact Daniele Payne DO Family Medicine Schedule an appointment as soon as possible for a visit   [place] [address]  [phone]            Discharge Medication List as of 1/29/2023  4:38 PM      CONTINUE these medications which have NOT CHANGED    Details   Aimovig 140 MG/ML SOAJ Inject 140mg under the skin every 30 (thirty) days  , Normal      ALPRAZolam ER (XANAX XR) 2 MG 24 hr tablet Take 1 tablet (2 mg total) by mouth every morning, Starting Wed 1/4/2023, Normal      baclofen 10 mg tablet 1 tab TID prn facial pain, jaw pain   Hold flexeril , Normal      buPROPion (WELLBUTRIN XL) 300 mg 24 hr tablet Take 1 tablet (300 mg total) by mouth daily, Starting Wed 1/4/2023, Normal      celecoxib (CeleBREX) 200 mg capsule Take 1 capsule (200 mg total) by mouth 2 (two) times a day, Starting Tue 12/13/2022, Normal      clotrimazole-betamethasone (LOTRISONE) 1-0 05 % cream Apply topically 2 (two) times a day, Starting Tue 12/20/2022, Normal      dihydroergotamine (MIGRANAL) 4 MG/ML nasal spray 1 spray into each nostril as needed for migraine Use in one nostril as directed  No more than 4 sprays in one hour, Starting Wed 8/31/2022, Normal      escitalopram (LEXAPRO) 20 mg tablet Take 1 tablet (20 mg total) by mouth daily, Starting Wed 1/4/2023, Normal      etonogestrel-ethinyl estradiol (NuvaRing) 0 12-0 015 MG/24HR vaginal ring Insert ring for 21 days then remove for one week , Normal      hydrOXYzine HCL (ATARAX) 25 mg tablet Take 1 tablet (25 mg total) by mouth every 6 (six) hours as needed for anxiety, Starting Wed 1/4/2023, Normal      indomethacin (INDOCIN) 25 mg capsule 1-2 tabs BID prn severe headache with food   Hold toradol , Normal      magnesium Oxide (MAG-OX) 400 mg TABS Take 1 tablet (400 mg total) by mouth daily, Starting Fri 10/28/2022, Until Tue 12/20/2022, Normal      moxifloxacin (AVELOX) 400 MG tablet Take 1 tablet (400 mg total) by mouth daily for 7 days, Starting Mon 1/23/2023, Until Mon 1/30/2023, Normal      OLANZapine (ZyPREXA) 2 5 mg tablet Take 1 tablet (2 5 mg total) by mouth daily Do not take with reglan , Starting Wed 1/4/2023, Normal      olmesartan (BENICAR) 20 mg tablet Take 1 tablet (20 mg total) by mouth daily, Starting Wed 9/28/2022, Normal      onabotulinumtoxin A (BOTOX) 100 units Inject as directed, Starting Tue 8/29/2017, Historical Med      ondansetron (ZOFRAN) 4 mg tablet Starting Fri 1/13/2023, Historical Med      pantoprazole (PROTONIX) 40 mg tablet Take 1 tablet (40 mg total) by mouth daily, Starting Fri 5/27/2022, Normal      pramipexole (MIRAPEX) 0 25 mg tablet Take 1 tablet (0 25 mg total) by mouth daily at bedtime, Starting Fri 11/11/2022, Normal      prazosin (MINIPRESS) 1 mg capsule Take 1 capsule (1 mg total) by mouth daily at bedtime, Starting Wed 1/4/2023, Normal      Upadacitinib ER (Rinvoq) 15 MG TB24 Take 15 mg by mouth daily, Starting Fri 1/13/2023, Historical Med No discharge procedures on file      PDMP Review       Value Time User    PDMP Reviewed  Yes 1/4/2023 10:44 AM Jud Moore MD          ED Provider  Electronically Signed by           Mikal Lantigua DO  01/30/23 1144

## 2023-01-30 LAB — HETEROPH AB SER QL: NEGATIVE

## 2023-01-31 ENCOUNTER — PROCEDURE VISIT (OUTPATIENT)
Dept: NEUROLOGY | Facility: CLINIC | Age: 33
End: 2023-01-31

## 2023-01-31 VITALS
BODY MASS INDEX: 45.53 KG/M2 | TEMPERATURE: 97.4 F | WEIGHT: 257 LBS | DIASTOLIC BLOOD PRESSURE: 82 MMHG | SYSTOLIC BLOOD PRESSURE: 119 MMHG

## 2023-01-31 DIAGNOSIS — E53.8 B12 DEFICIENCY: ICD-10-CM

## 2023-01-31 DIAGNOSIS — G43.709 CHRONIC MIGRAINE WITHOUT AURA WITHOUT STATUS MIGRAINOSUS, NOT INTRACTABLE: Primary | ICD-10-CM

## 2023-01-31 RX ORDER — CYANOCOBALAMIN 1000 UG/ML
INJECTION, SOLUTION INTRAMUSCULAR; SUBCUTANEOUS
Qty: 4 ML | Refills: 2 | Status: SHIPPED | OUTPATIENT
Start: 2023-01-31

## 2023-01-31 NOTE — PROGRESS NOTES
Universal Protocol   Consent: Verbal consent obtained  Written consent obtained    Risks and benefits: risks, benefits and alternatives were discussed  Consent given by: patient  Patient understanding: patient states understanding of the procedure being performed  Patient consent: the patient's understanding of the procedure matches consent given  Procedure consent: procedure consent matches procedure scheduled        Chemodenervation     Date/Time 1/31/2023 8:07 AM     Performed by  Mackenzie Watt PA-C     Authorized by Mackenzie Watt PA-C        Pre-procedure details      Prepped With: Alcohol     Procedure details     Position:  Upright   Botox     Botox Type:  Type A    Brand:  Botox    mL's of Botulinum Toxin:  200    Final Concentration per CC:  100 units    Needle Gauge:  30 G 2 5 inch   Procedures     Botox Procedures: chronic headache      Indications: migraines     Injection Location      Head / Face:  L superior cervical paraspinal, R superior cervical paraspinal, L , R , procerus, L temporalis, R temporalis, R frontalis, L frontalis, R medial occipitalis and L medial occipitalis    L  injection amount:  5 unit(s)    R  injection amount:  5 unit(s)    L lateral frontalis:  5 unit(s)    R lateral frontalis:  5 unit(s)    L medial frontalis:  5 unit(s)    R medial frontalis:  5 unit(s)    L temporalis injection amount:  20 unit(s)    R temporalis injection amount:  20 unit(s)    Procerus injection amount:  5 unit(s)    L medial occipitalis injection amount:  15 unit(s)    R medial occipitalis injection amount:  15 unit(s)    L superior cervical paraspinal injection amount:  10 unit(s)    R superior cervical paraspinal injection amount:  10 unit(s)   Total Units     Total units used:  200    Total units discarded:  0   Post-procedure details      Chemodenervation:  Chronic migraine    Facial Nerve Location[de-identified]  Bilateral facial nerve    Patient tolerance of procedure: Tolerated well, no immediate complications   Comments      Medically necessary:  - 10 R masseter  - 10 L masseter  - 30 units between the R and L headband region/ scalp  - 25 units between each anterior temporalis  Avoided traps b/l  Blood pressure 119/82, temperature (!) 97 4 °F (36 3 °C), temperature source Temporal, weight 117 kg (257 lb), not currently breastfeeding  Pt reports another episode of dizzy feeling prior to start of headache/ migraine  She was instructed to take indocin at onset of dizzy feeling as this may represent migraine aura/ warning? No seizure like sxs   Sleep deprived EEG normal in recent past

## 2023-01-31 NOTE — PATIENT INSTRUCTIONS
Botox Therapy  Important Information    Our goal is to make sure you fully understand how Botox Therapy treatment may benefit you and to help you understand how you can play an active role in your treatments and ongoing care  Please review the following information below  Call our office IMMEDIATELY @ [phone] and speak to one of our Botox Coordinators if you have a change in insurance  (a prior authorization is required and accurate information is vital)  Please call at least 24 hours in advance if you can't make your appointment  Appointments are scheduled every 91 days (this will be scheduled in advance before leaving the office)  You must allow for at least 2-3 treatments to determine if Botox is right for you  It may take a few weeks to see a response from treatment  No hair dye or scalp massage or treatment within 24 hours of treatment  We encourage you to use a headache diary or journal to document your headache frequency and severity  You can also utilize the Migraine Eliezer alexsandra (downloadable on [employer])  Sign up for the Botox Savings Program  (commercial insurance patients may qualify) Sign up at [employer] or call [phone] Option: 4  To get more information on Botox therapy for Chronic Migraines and see frequently asked questions, please visit [facility]  If you have any questions or concerns, please speak to one of our Botox Coordinators at [phone]  We look forward to servicing you! Headache management instructions  - When patient has a moderate to severe headache, they should seek rest, initiate relaxation and apply cold compresses to the head  - Maintain regular sleep schedule  Adults need at least 7-8 hours of uninterrupted a night  - Limit over the counter medications such as Tylenol, Ibuprofen, Aleve, Excedrin  (No more than 2- 3 times a week or max 10 a month)  - Maintain headache diary    Free ALEXSANDRA for a smart phone, which can be used is "Migraine syl"  - Limit caffeine to 1-2 cups 8 to 16 oz a day or less  - Avoid dietary trigger  (aged cheese, peanuts, MSG, aspartame and nitrates)  - Patient is to have regular frequent meals to prevent headache onset  - Please drink at least 64 ounces of water a day to help remain hydrated

## 2023-02-06 ENCOUNTER — TELEPHONE (OUTPATIENT)
Dept: PSYCHIATRY | Facility: CLINIC | Age: 33
End: 2023-02-06

## 2023-02-06 DIAGNOSIS — J01.00 ACUTE NON-RECURRENT MAXILLARY SINUSITIS: Primary | ICD-10-CM

## 2023-02-06 DIAGNOSIS — J06.9 UPPER RESPIRATORY TRACT INFECTION, UNSPECIFIED TYPE: ICD-10-CM

## 2023-02-06 DIAGNOSIS — J02.0 PHARYNGITIS DUE TO STREPTOCOCCUS SPECIES: Primary | ICD-10-CM

## 2023-02-06 RX ORDER — SULFAMETHOXAZOLE AND TRIMETHOPRIM 800; 160 MG/1; MG/1
1 TABLET ORAL EVERY 12 HOURS SCHEDULED
Qty: 20 TABLET | Refills: 0 | Status: SHIPPED | OUTPATIENT
Start: 2023-02-06 | End: 2023-02-16

## 2023-02-06 RX ORDER — SULFAMETHOXAZOLE AND TRIMETHOPRIM 800; 160 MG/1; MG/1
1 TABLET ORAL EVERY 12 HOURS SCHEDULED
Qty: 14 TABLET | Refills: 0 | Status: CANCELLED | OUTPATIENT
Start: 2023-02-06 | End: 2023-02-13

## 2023-02-06 NOTE — TELEPHONE ENCOUNTER
Medical records request ([place] [facility]) was received by mail today, 2/6/23  The date range is 12/29/22 to Present  Placed in Dr Maribell Kessler for review  Also to be circulated to [facility]

## 2023-02-13 ENCOUNTER — TELEPHONE (OUTPATIENT)
Dept: PSYCHIATRY | Facility: CLINIC | Age: 33
End: 2023-02-13

## 2023-02-13 DIAGNOSIS — F43.12 CHRONIC POST-TRAUMATIC STRESS DISORDER (PTSD): ICD-10-CM

## 2023-02-13 RX ORDER — PRAZOSIN HYDROCHLORIDE 2 MG/1
2 CAPSULE ORAL
Qty: 30 CAPSULE | Refills: 2 | Status: SHIPPED | OUTPATIENT
Start: 2023-02-13 | End: 2023-05-23 | Stop reason: SDUPTHER

## 2023-02-13 NOTE — TELEPHONE ENCOUNTER
Patient called the office requesting to send message to provider regarding medication  Patient has been experiencing more nightmares than normal and is requesting to increase her 1mg Prazosin  Confirmed patients phone number  Please advise

## 2023-02-17 ENCOUNTER — TELEMEDICINE (OUTPATIENT)
Dept: BEHAVIORAL/MENTAL HEALTH CLINIC | Facility: CLINIC | Age: 33
End: 2023-02-17

## 2023-02-17 DIAGNOSIS — F41.0 PANIC DISORDER WITHOUT AGORAPHOBIA: ICD-10-CM

## 2023-02-17 DIAGNOSIS — F41.1 GENERALIZED ANXIETY DISORDER: Chronic | ICD-10-CM

## 2023-02-17 DIAGNOSIS — F33.1 DEPRESSION, MAJOR, RECURRENT, MODERATE (HCC): Primary | Chronic | ICD-10-CM

## 2023-02-17 NOTE — PSYCH
Virtual Regular Visit    Verification of patient location:    Patient is located in the following state in which I hold an active license [place]    Assessment/Plan:    Problem List Items Addressed This Visit        Other    Generalized anxiety disorder (Chronic)    Depression, major, recurrent, moderate (HCC) - Primary (Chronic)    Panic disorder without agoraphobia     Goals addressed in session: Goal 1      Reason for visit is   Chief Complaint   Patient presents with   • Virtual Regular Visit      Encounter provider APARNA Geller    Provider located at [address] [place] [id number] [phone]    Recent Visits  No visits were found meeting these conditions  Showing recent visits within past 7 days and meeting all other requirements  Today's Visits  Date Type Provider Dept   02/17/23 [address], [address] today's visits and meeting all other requirements  Future Appointments  No visits were found meeting these conditions  Showing future appointments within next 150 days and meeting all other requirements     The patient was identified by name and date of birth  Chrissiemaryanne Robles was informed that this is a telemedicine visit and that the [facility]  She agrees to proceed     My office door was closed  No one else was in the room  She acknowledged consent and understanding of privacy and security of the video platform  The patient has agreed to participate and understands they can discontinue the visit at any time  Patient is aware this is a billable service  Aliyah Katz is a 33 y o  female      HPI     Past Medical History:   Diagnosis Date   • Abnormal Pap smear of cervix    • Allergic    • Anxiety    • Arthritis    • Depression    • Diabetes mellitus ([facility] [address] )    • Fibromyalgia, primary    • Hypertension    • IBS (irritable bowel syndrome)    • Migraine    • Obesity    • Vitamin B12 deficiency        Past Surgical History:   Procedure Laterality Date   • COLONOSCOPY N/A 5/8/2018    Procedure: COLONOSCOPY;  Surgeon: Sherry Dennison MD;  Location: [facility] GI LAB; Service: Gastroenterology   • CA EXC B9 LESION MRGN XCP SK TG S/N/H/F/G > 4 0CM N/A 7/1/2021    Procedure: EXCISION OF PILAR SCALP CYST X 2 AND RIGHT INNER GROIN NEVVUS;  Surgeon: Alice Bay MD;  Location: [facility] MAIN OR;  Service: Plastics   • CA REPAIR COMPLEX SCALP/ARM/LEG 2 6-7 5 CM N/A 7/1/2021    Procedure: CLOSURE WOUND SCALP X 2 AND RIGHT INNER GROIN;  Surgeon: Alice Bay MD;  Location: [facility] MAIN OR;  Service: Plastics   • TONSILLECTOMY     • WISDOM TOOTH EXTRACTION         Current Outpatient Medications   Medication Sig Dispense Refill   • Aimovig 140 MG/ML SOAJ Inject 140mg under the skin every 30 (thirty) days  1 mL 10   • ALPRAZolam ER (XANAX XR) 2 MG 24 hr tablet Take 1 tablet (2 mg total) by mouth every morning 30 tablet 2   • baclofen 10 mg tablet 1 tab TID prn facial pain, jaw pain  Hold flexeril  90 tablet 0   • buPROPion (WELLBUTRIN XL) 300 mg 24 hr tablet Take 1 tablet (300 mg total) by mouth daily 90 tablet 0   • celecoxib (CeleBREX) 200 mg capsule Take 1 capsule (200 mg total) by mouth 2 (two) times a day (Patient not taking: Reported on 12/13/2022) 60 capsule 0   • clotrimazole-betamethasone (LOTRISONE) 1-0 05 % cream Apply topically 2 (two) times a day 30 g 0   • cyanocobalamin 1,000 mcg/mL 1 IM injection every week x 4 weeks, then 1 every month 4 mL 2   • dihydroergotamine (MIGRANAL) 4 MG/ML nasal spray 1 spray into each nostril as needed for migraine Use in one nostril as directed    No more than 4 sprays in one hour 16 mL 0   • escitalopram (LEXAPRO) 20 mg tablet Take 1 tablet (20 mg total) by mouth daily 90 tablet 0   • etonogestrel-ethinyl estradiol (NuvaRing) 0 12-0 015 MG/24HR vaginal ring Insert ring for 21 days then remove for one week  3 each 4   • hydrOXYzine HCL (ATARAX) 25 mg tablet Take 1 tablet (25 mg total) by mouth every 6 (six) hours as needed for anxiety 30 tablet 0   • indomethacin (INDOCIN) 25 mg capsule 1-2 tabs BID prn severe headache with food  Hold toradol  30 capsule 0   • magnesium Oxide (MAG-OX) 400 mg TABS Take 1 tablet (400 mg total) by mouth daily 30 tablet 0   • OLANZapine (ZyPREXA) 2 5 mg tablet Take 1 tablet (2 5 mg total) by mouth daily Do not take with reglan  30 tablet 2   • olmesartan (BENICAR) 20 mg tablet Take 1 tablet (20 mg total) by mouth daily (Patient not taking: Reported on 11/8/2022) 90 tablet 0   • onabotulinumtoxin A (BOTOX) 100 units Inject as directed     • ondansetron (ZOFRAN) 4 mg tablet      • pantoprazole (PROTONIX) 40 mg tablet Take 1 tablet (40 mg total) by mouth daily 30 tablet 3   • pramipexole (MIRAPEX) 0 25 mg tablet Take 1 tablet (0 25 mg total) by mouth daily at bedtime 90 tablet 1   • prazosin (MINIPRESS) 2 mg capsule Take 1 capsule (2 mg total) by mouth daily at bedtime 30 capsule 2   • Syringe/Needle, Disp, (SYRINGE 3CC/12TN8-0/2") 25G X 1-1/2" 3 ML MISC Use for B12 IM injections   4 each 0   • Upadacitinib ER (Rinvoq) 15 MG TB24 Take 15 mg by mouth daily       Current Facility-Administered Medications   Medication Dose Route Frequency Provider Last Rate Last Admin   • cyanocobalamin injection 1,000 mcg  1,000 mcg Intramuscular Q30 Days Elayne Gibson, DO   1,000 mcg at 08/03/20 8375   • cyanocobalamin injection 1,000 mcg  1,000 mcg Intramuscular Q14 Days Elayne Gibson, DO   1,000 mcg at 05/18/20 1146   • cyanocobalamin injection 1,000 mcg  1,000 mcg Intramuscular Q30 Days Elayne Gibson, DO   1,000 mcg at 09/01/20 1129        Allergies   Allergen Reactions   • Cimzia [Certolizumab Pegol] Swelling   • Desvenlafaxine      Other reaction(s): state of confusion   • Ixekizumab Vomiting   • Valproic Acid Other (See Comments)     Other reaction(s): dilated pupils, "schizi"   • Voltaren [Diclofenac] Swelling   • Tizanidine Anxiety       Review of Systems    Video Exam    There were no vitals filed for this visit  Physical Exam     Behavioral Health Psychotherapy Progress Note    Psychotherapy Provided: Individual Psychotherapy     1  Depression, major, recurrent, moderate ([address] )        2  Generalized anxiety disorder        3  Panic disorder without agoraphobia            Goals addressed in session: Goal 1     DATA: "I have a lot for you today " Stephanie initially discussed her health-related issues  She has started a new medication, which she thinks might be working for her  She states that she is participating in some of the activities that bring her ciara (e g , moshe-art)  She discussed her grief/loss related to the death of her friend  She states that she has regret that she never told him how she felt about him  She states that she feels that she will never meet someone who makes her feel the way that he did  She states that her ex Claucodie Carrolll) got   She states that she is not sad for herself because of this, but she is surprised that he got  so soon after the end of their relationship  We spoke about her feelings of being fearful about meeting other people  We discussed looking at events in the area that might be of interest to her-- even if she does not attend the events  She discussed some potential ways that she can increase her socialization  Laura Jones states that she feels that she is doing well overall  She states, "I'm pretty proud of myself "     During this session, this clinician used the following therapeutic modalities: Client-centered Therapy, Dialectical Behavior Therapy, Mindfulness-based Strategies, Motivational Interviewing, Solution-Focused Therapy and Supportive Psychotherapy    Substance Abuse was not addressed during this session   If the client is diagnosed with a co-occurring substance use disorder, please indicate any changes in the frequency or amount of use: N/A  Stage of change for addressing substance use diagnoses: No substance use/Not applicable    ASSESSMENT:  Tai Celeste presents with a Dysthymic mood  her affect is Normal range and intensity, which is congruent, with her mood and the content of the session  The client has made progress on their goals  Tai Celeste presents with a minimal risk of suicide, minimal risk of self-harm, and minimal risk of harm to others  For any risk assessment that surpasses a "low" rating, a safety plan must be developed  A safety plan was indicated: no  If yes, describe in detail N/A    PLAN: Between sessions, Tai Celeste will continue to monitor her moods  She will use CBT skills (thought stopping, reframing) to manage her moods and change her thoughts  At the next session, the therapist will use Client-centered Therapy, Dialectical Behavior Therapy, Mindfulness-based Strategies, Motivational Interviewing, Solution-Focused Therapy and Supportive Psychotherapy to address her mood regulation and relationship concerns  Behavioral Health Treatment Plan and Discharge Planning: Tai Celeste is aware of and agrees to continue to work on their treatment plan  They have identified and are working toward their discharge goals   yes    Visit start and stop times:    02/17/23  Start Time: 0807  Stop Time: 6238  Total Visit Time: 46 minutes

## 2023-02-19 ENCOUNTER — APPOINTMENT (EMERGENCY)
Dept: CT IMAGING | Facility: HOSPITAL | Age: 33
End: 2023-02-19

## 2023-02-19 ENCOUNTER — HOSPITAL ENCOUNTER (EMERGENCY)
Facility: HOSPITAL | Age: 33
Discharge: HOME | End: 2023-02-19
Attending: EMERGENCY MEDICINE

## 2023-02-19 VITALS
TEMPERATURE: 97.8 F | RESPIRATION RATE: 17 BRPM | HEART RATE: 97 BPM | DIASTOLIC BLOOD PRESSURE: 93 MMHG | OXYGEN SATURATION: 96 % | SYSTOLIC BLOOD PRESSURE: 161 MMHG

## 2023-02-19 DIAGNOSIS — R10.9 ABDOMINAL PAIN: ICD-10-CM

## 2023-02-19 DIAGNOSIS — K52.9 COLITIS: ICD-10-CM

## 2023-02-19 DIAGNOSIS — R11.0 NAUSEA: ICD-10-CM

## 2023-02-19 DIAGNOSIS — K62.5 RECTAL BLEEDING: Primary | ICD-10-CM

## 2023-02-19 LAB
ALBUMIN SERPL BCP-MCNC: 3.9 G/DL (ref 3.5–5)
ALP SERPL-CCNC: 46 U/L (ref 34–104)
ALT SERPL W P-5'-P-CCNC: 13 U/L (ref 7–52)
ANION GAP SERPL CALCULATED.3IONS-SCNC: 8 MMOL/L (ref 4–13)
AST SERPL W P-5'-P-CCNC: 15 U/L (ref 13–39)
BASOPHILS # BLD AUTO: 0.04 THOUSANDS/ÂΜL (ref 0–0.1)
BASOPHILS NFR BLD AUTO: 0 % (ref 0–1)
BILIRUB SERPL-MCNC: 0.5 MG/DL (ref 0.2–1)
BILIRUB UR QL STRIP: NEGATIVE
BUN SERPL-MCNC: 14 MG/DL (ref 5–25)
CALCIUM SERPL-MCNC: 9.5 MG/DL (ref 8.4–10.2)
CHLORIDE SERPL-SCNC: 103 MMOL/L (ref 96–108)
CLARITY UR: CLEAR
CO2 SERPL-SCNC: 21 MMOL/L (ref 21–32)
COLOR UR: YELLOW
CREAT SERPL-MCNC: 0.72 MG/DL (ref 0.6–1.3)
EOSINOPHIL # BLD AUTO: 0.06 THOUSAND/ÂΜL (ref 0–0.61)
EOSINOPHIL NFR BLD AUTO: 1 % (ref 0–6)
ERYTHROCYTE [DISTWIDTH] IN BLOOD BY AUTOMATED COUNT: 16.6 % (ref 11.6–15.1)
EXT PREGNANCY TEST URINE: NEGATIVE
EXT. CONTROL: NORMAL
GFR SERPL CREATININE-BSD FRML MDRD: 111 ML/MIN/1.73SQ M
GLUCOSE SERPL-MCNC: 106 MG/DL (ref 65–140)
GLUCOSE UR STRIP-MCNC: NEGATIVE MG/DL
HCT VFR BLD AUTO: 38.8 % (ref 34.8–46.1)
HGB BLD-MCNC: 12.7 G/DL (ref 11.5–15.4)
HGB UR QL STRIP.AUTO: NEGATIVE
IMM GRANULOCYTES # BLD AUTO: 0.04 THOUSAND/UL (ref 0–0.2)
IMM GRANULOCYTES NFR BLD AUTO: 0 % (ref 0–2)
KETONES UR STRIP-MCNC: NEGATIVE MG/DL
LEUKOCYTE ESTERASE UR QL STRIP: NEGATIVE
LIPASE SERPL-CCNC: 31 U/L (ref 11–82)
LYMPHOCYTES # BLD AUTO: 3.99 THOUSANDS/ÂΜL (ref 0.6–4.47)
LYMPHOCYTES NFR BLD AUTO: 34 % (ref 14–44)
MCH RBC QN AUTO: 28 PG (ref 26.8–34.3)
MCHC RBC AUTO-ENTMCNC: 32.7 G/DL (ref 31.4–37.4)
MCV RBC AUTO: 86 FL (ref 82–98)
MONOCYTES # BLD AUTO: 0.76 THOUSAND/ÂΜL (ref 0.17–1.22)
MONOCYTES NFR BLD AUTO: 6 % (ref 4–12)
NEUTROPHILS # BLD AUTO: 7.03 THOUSANDS/ÂΜL (ref 1.85–7.62)
NEUTS SEG NFR BLD AUTO: 59 % (ref 43–75)
NITRITE UR QL STRIP: NEGATIVE
NRBC BLD AUTO-RTO: 0 /100 WBCS
PH UR STRIP.AUTO: 5.5 [PH]
PLATELET # BLD AUTO: 482 THOUSANDS/UL (ref 149–390)
PMV BLD AUTO: 8.8 FL (ref 8.9–12.7)
POTASSIUM SERPL-SCNC: 4.1 MMOL/L (ref 3.5–5.3)
PROT SERPL-MCNC: 6.9 G/DL (ref 6.4–8.4)
PROT UR STRIP-MCNC: NEGATIVE MG/DL
RBC # BLD AUTO: 4.54 MILLION/UL (ref 3.81–5.12)
SODIUM SERPL-SCNC: 132 MMOL/L (ref 135–147)
SP GR UR STRIP.AUTO: >=1.03
UROBILINOGEN UR QL STRIP.AUTO: 0.2 E.U./DL
WBC # BLD AUTO: 11.92 THOUSAND/UL (ref 4.31–10.16)

## 2023-02-19 RX ORDER — ONDANSETRON 2 MG/ML
4 INJECTION INTRAMUSCULAR; INTRAVENOUS ONCE
Status: COMPLETED | OUTPATIENT
Start: 2023-02-19 | End: 2023-02-19

## 2023-02-19 RX ORDER — KETOROLAC TROMETHAMINE 30 MG/ML
15 INJECTION, SOLUTION INTRAMUSCULAR; INTRAVENOUS ONCE
Status: COMPLETED | OUTPATIENT
Start: 2023-02-19 | End: 2023-02-19

## 2023-02-19 RX ORDER — DICYCLOMINE HCL 20 MG
20 TABLET ORAL 2 TIMES DAILY
Qty: 14 TABLET | Refills: 0 | Status: SHIPPED | OUTPATIENT
Start: 2023-02-19 | End: 2023-02-26

## 2023-02-19 RX ORDER — ACETAMINOPHEN 325 MG/1
975 TABLET ORAL ONCE
Status: COMPLETED | OUTPATIENT
Start: 2023-02-19 | End: 2023-02-19

## 2023-02-19 RX ORDER — ONDANSETRON 4 MG/1
4 TABLET, ORALLY DISINTEGRATING ORAL EVERY 6 HOURS PRN
Qty: 6 TABLET | Refills: 0 | Status: SHIPPED | OUTPATIENT
Start: 2023-02-19

## 2023-02-19 RX ADMIN — KETOROLAC TROMETHAMINE 15 MG: 30 INJECTION, SOLUTION INTRAMUSCULAR at 04:39

## 2023-02-19 RX ADMIN — ONDANSETRON 4 MG: 2 INJECTION INTRAMUSCULAR; INTRAVENOUS at 04:36

## 2023-02-19 RX ADMIN — ACETAMINOPHEN 975 MG: 325 TABLET ORAL at 04:40

## 2023-02-19 RX ADMIN — IOHEXOL 100 ML: 350 INJECTION, SOLUTION INTRAVENOUS at 05:14

## 2023-02-19 RX ADMIN — SODIUM CHLORIDE 1000 ML: 0.9 INJECTION, SOLUTION INTRAVENOUS at 04:40

## 2023-02-19 NOTE — DISCHARGE INSTRUCTIONS
You have been evaluated in the Emergency Department today for rectal bleeding  Your evaluation, including labs and imaging, suggests that your symptoms are due to a non emergent cause  Please follow up with your primary care physician within two days  I also placed a referral to gastroenterology  They will call you  Please follow up with them  I prescribed bentyl and zofran  This is at your pharmacy  Please take as prescribed  Return to the Emergency Department if you experience worsening symptoms, uncontrolled abdominal pain, uncontrolled vomiting, or any other concerns  Thank you for choosing us for your care
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
## 2023-02-22 ENCOUNTER — VBI (OUTPATIENT)
Dept: ADMINISTRATIVE | Facility: OTHER | Age: 33
End: 2023-02-22

## 2023-02-24 ENCOUNTER — VBI (OUTPATIENT)
Dept: ADMINISTRATIVE | Facility: OTHER | Age: 33
End: 2023-02-24

## 2023-02-27 ENCOUNTER — TELEPHONE (OUTPATIENT)
Dept: PSYCHIATRY | Facility: CLINIC | Age: 33
End: 2023-02-27

## 2023-03-03 ENCOUNTER — TELEMEDICINE (OUTPATIENT)
Dept: BEHAVIORAL/MENTAL HEALTH CLINIC | Facility: CLINIC | Age: 33
End: 2023-03-03

## 2023-03-03 DIAGNOSIS — F41.1 GENERALIZED ANXIETY DISORDER: Chronic | ICD-10-CM

## 2023-03-03 DIAGNOSIS — F33.1 DEPRESSION, MAJOR, RECURRENT, MODERATE (HCC): Primary | Chronic | ICD-10-CM
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
## 2023-03-05 ENCOUNTER — PATIENT MESSAGE (OUTPATIENT)
Dept: NEUROLOGY | Facility: CLINIC | Age: 33
End: 2023-03-05

## 2023-03-05 DIAGNOSIS — G43.709 CHRONIC MIGRAINE WITHOUT AURA WITHOUT STATUS MIGRAINOSUS, NOT INTRACTABLE: Primary | ICD-10-CM

## 2023-03-06 ENCOUNTER — OFFICE VISIT (OUTPATIENT)
Dept: FAMILY MEDICINE CLINIC | Facility: CLINIC | Age: 33
End: 2023-03-06

## 2023-03-06 VITALS
BODY MASS INDEX: 45.43 KG/M2 | OXYGEN SATURATION: 99 % | HEART RATE: 98 BPM | WEIGHT: 256.4 LBS | SYSTOLIC BLOOD PRESSURE: 122 MMHG | DIASTOLIC BLOOD PRESSURE: 88 MMHG | HEIGHT: 63 IN | TEMPERATURE: 97.5 F

## 2023-03-06 DIAGNOSIS — J32.9 RECURRENT SINUSITIS: Primary | ICD-10-CM

## 2023-03-06 RX ORDER — DOXYCYCLINE HYCLATE 100 MG/1
100 CAPSULE ORAL EVERY 12 HOURS SCHEDULED
Qty: 42 CAPSULE | Refills: 0 | Status: SHIPPED | OUTPATIENT
Start: 2023-03-06 | End: 2023-03-27

## 2023-03-06 RX ORDER — DIHYDROERGOTAMINE MESYLATE 4 MG/ML
SPRAY, METERED NASAL
Qty: 12 ML | Refills: 0 | Status: SHIPPED | OUTPATIENT
Start: 2023-03-06

## 2023-03-06 NOTE — PROGRESS NOTES
Assessment/Plan: Patient use doxycycline as directed  Patient will be referred to ENT for further evaluation and treatment  Diagnoses and all orders for this visit:    Recurrent sinusitis  -     doxycycline hyclate (VIBRAMYCIN) 100 mg capsule; Take 1 capsule (100 mg total) by mouth every 12 (twelve) hours for 21 days  -     Ambulatory Referral to Otolaryngology; Future            Subjective:        Patient ID: Kyle Arndt is a 33 y o  female  Patient is here with sinus issues as well as left ear discomfort over the past 3 to 4 months  Patient with left maxillary sinus pain patient does have some submandibular gland discomfort also  This is on the left  No fever noted  The following portions of the patient's history were reviewed and updated as appropriate: allergies, current medications, past family history, past medical history, past social history, past surgical history and problem list       Review of Systems   Constitutional: Negative  Negative for fever  HENT: Positive for congestion, ear pain, postnasal drip, rhinorrhea, sinus pressure and sinus pain  Negative for sore throat  Eyes: Negative  Respiratory: Negative  Cardiovascular: Negative  Gastrointestinal: Negative  Endocrine: Negative  Genitourinary: Negative  Musculoskeletal: Negative  Skin: Negative  Allergic/Immunologic: Negative  Neurological: Negative  Hematological: Negative  Psychiatric/Behavioral: Negative  Objective:      BMI Counseling: Body mass index is 45 42 kg/m²  The BMI is above normal  Nutrition recommendations include consuming healthier snacks  Exercise recommendations include exercising 3-5 times per week  Rationale for BMI follow-up plan is due to patient being overweight or obese               /88 (BP Location: Right arm, Patient Position: Sitting, Cuff Size: Large)   Pulse 98   Temp 97 5 °F (36 4 °C) (Temporal)   Ht 5' 3" (1 6 m)   Wt 116 kg (256 lb 6 4 oz)   SpO2 99%   BMI 45 42 kg/m²          Physical Exam  Vitals and nursing note reviewed  Constitutional:       General: She is not in acute distress  Appearance: Normal appearance  She is not ill-appearing, toxic-appearing or diaphoretic  HENT:      Head: Normocephalic and atraumatic  Right Ear: Tympanic membrane, ear canal and external ear normal  There is no impacted cerumen  Left Ear: Tympanic membrane, ear canal and external ear normal  There is no impacted cerumen  Nose: Rhinorrhea present  No congestion  Mouth/Throat:      Mouth: Mucous membranes are moist       Pharynx: No oropharyngeal exudate or posterior oropharyngeal erythema  Eyes:      General: No scleral icterus  Right eye: No discharge  Left eye: No discharge  Extraocular Movements: Extraocular movements intact  Conjunctiva/sclera: Conjunctivae normal       Pupils: Pupils are equal, round, and reactive to light  Neck:      Vascular: No carotid bruit  Cardiovascular:      Rate and Rhythm: Normal rate and regular rhythm  Pulses: Normal pulses  Heart sounds: Normal heart sounds  No murmur heard  No friction rub  No gallop  Pulmonary:      Effort: Pulmonary effort is normal  No respiratory distress  Breath sounds: Normal breath sounds  No stridor  No wheezing, rhonchi or rales  Chest:      Chest wall: No tenderness  Musculoskeletal:         General: No swelling, tenderness, deformity or signs of injury  Normal range of motion  Cervical back: Normal range of motion and neck supple  No rigidity  No muscular tenderness  Right lower leg: No edema  Left lower leg: No edema  Lymphadenopathy:      Cervical: No cervical adenopathy  Skin:     General: Skin is warm and dry  Capillary Refill: Capillary refill takes less than 2 seconds  Coloration: Skin is not jaundiced  Findings: No bruising, erythema, lesion or rash     Neurological: Mental Status: She is alert and oriented to person, place, and time  Mental status is at baseline  Cranial Nerves: No cranial nerve deficit  Sensory: No sensory deficit  Motor: No weakness  Coordination: Coordination normal       Gait: Gait normal    Psychiatric:         Mood and Affect: Mood normal          Behavior: Behavior normal          Thought Content:  Thought content normal          Judgment: Judgment normal

## 2023-03-15 DIAGNOSIS — G43.709 CHRONIC MIGRAINE WITHOUT AURA WITHOUT STATUS MIGRAINOSUS, NOT INTRACTABLE: ICD-10-CM

## 2023-03-15 NOTE — TELEPHONE ENCOUNTER
Received VM transcription:    Good afternoon  I'm calling from [facility] on behalf of mutual patient Berny House  Patient of Rosana Wilks  We're requesting a refill on her Aimovig auto injector 140 mg/mL  Injecting 1 pen under the skin every 30 days  Our phone number is [phone]  Our fax number is [phone]  Again, this is [facility]  Have a good afternoon  --------------------------------------------------------    Next OV 3/30/2023 with [address]  [address] - Rx entered from pharmacy  Please review and sign if in agreement

## 2023-03-16 RX ORDER — ERENUMAB-AOOE 140 MG/ML
INJECTION, SOLUTION SUBCUTANEOUS
Qty: 1 ML | Refills: 11 | Status: SHIPPED | OUTPATIENT
Start: 2023-03-16

## 2023-03-17 ENCOUNTER — TELEMEDICINE (OUTPATIENT)
Dept: BEHAVIORAL/MENTAL HEALTH CLINIC | Facility: CLINIC | Age: 33
End: 2023-03-17

## 2023-03-17 DIAGNOSIS — F33.1 DEPRESSION, MAJOR, RECURRENT, MODERATE (HCC): Primary | Chronic | ICD-10-CM

## 2023-03-17 DIAGNOSIS — F41.1 GENERALIZED ANXIETY DISORDER: Chronic | ICD-10-CM

## 2023-03-17 NOTE — PSYCH
Virtual Regular Visit    Verification of patient location:    Patient is located in the following state in which I hold an active license [place]    Assessment/Plan:    Problem List Items Addressed This Visit        Other    Generalized anxiety disorder (Chronic)    Depression, major, recurrent, moderate ([facility] [address] ) - Primary (Chronic)     Goals addressed in session: Goal 1      Reason for visit is   Chief Complaint   Patient presents with   • Virtual Regular Visit      Encounter provider APARNA Sullivan    Provider located at [address] [place] [id number] [phone]      Recent Visits  No visits were found meeting these conditions  Showing recent visits within past 7 days and meeting all other requirements  Today's Visits  Date Type Provider Dept   03/17/23 [address] today's visits and meeting all other requirements  Future Appointments  No visits were found meeting these conditions  Showing future appointments within next 150 days and meeting all other requirements       The patient was identified by name and date of birth  Nathan Leon was informed that this is a telemedicine visit and [facility] the [facility]  She agrees to proceed     My office door was closed  No one else was in the room  She acknowledged consent and understanding of privacy and security of the video platform  The patient has agreed to participate and understands they can discontinue the visit at any time  Patient is aware this is a billable service  Lindsay Ruggiero is a 33 y o  female      HPI     Past Medical History:   Diagnosis Date   • Abnormal Pap smear of cervix    • Allergic    • Anxiety    • Arthritis    • Depression    • Diabetes mellitus ([facility] [address] )    • Fibromyalgia, primary    • Hypertension    • IBS (irritable bowel syndrome)    • Migraine    • Obesity    • Vitamin B12 deficiency        Past Surgical History:   Procedure Laterality Date   • COLONOSCOPY N/A 5/8/2018    Procedure: COLONOSCOPY;  Surgeon: Angle Omalley MD;  Location: [facility]; Service: Gastroenterology   • ND EXC B9 LESION MRGN XCP SK TG S/N/H/F/G > 4 0CM N/A 7/1/2021    Procedure: EXCISION OF PILAR SCALP CYST X 2 AND RIGHT INNER GROIN NEVVUS;  Surgeon: Aime Barros MD;  Location: [address] [place] [address];  Service: Plastics   • ND REPAIR COMPLEX SCALP/ARM/LEG 2 6-7 5 CM N/A 7/1/2021    Procedure: CLOSURE WOUND SCALP X 2 AND RIGHT INNER GROIN;  Surgeon: Aime Barros MD;  Location: [address] [place] [address];  Service: Plastics   • TONSILLECTOMY     • WISDOM TOOTH EXTRACTION         Current Outpatient Medications   Medication Sig Dispense Refill   • Aimovig 140 MG/ML SOAJ Inject 140mg (1 pen) under the skin every 30 days  1 mL 11   • ALPRAZolam ER (XANAX XR) 2 MG 24 hr tablet Take 1 tablet (2 mg total) by mouth every morning 30 tablet 2   • baclofen 10 mg tablet 1 tab TID prn facial pain, jaw pain  Hold flexeril  90 tablet 0   • buPROPion (WELLBUTRIN XL) 300 mg 24 hr tablet Take 1 tablet (300 mg total) by mouth daily 90 tablet 0   • celecoxib (CeleBREX) 200 mg capsule Take 1 capsule (200 mg total) by mouth 2 (two) times a day (Patient not taking: Reported on 12/13/2022) 60 capsule 0   • clotrimazole-betamethasone (LOTRISONE) 1-0 05 % cream Apply topically 2 (two) times a day 30 g 0   • cyanocobalamin 1,000 mcg/mL 1 IM injection every week x 4 weeks, then 1 every month 4 mL 2   • dicyclomine (BENTYL) 20 mg tablet Take 1 tablet (20 mg total) by mouth 2 (two) times a day for 7 days 14 tablet 0   • dihydroergotamine (MIGRANAL) 4 MG/ML nasal spray 1 spray into each nostril as needed for migraine Use in one nostril as directed    No more than 4 sprays in one hour 16 mL 0   • doxycycline hyclate (VIBRAMYCIN) 100 mg capsule Take 1 capsule (100 mg total) by mouth every 12 (twelve) hours for 21 days 42 capsule 0   • escitalopram (LEXAPRO) 20 mg tablet Take 1 tablet (20 mg total) by mouth daily 90 tablet 0   • etonogestrel-ethinyl estradiol (NuvaRing) 0 12-0 015 MG/24HR vaginal ring Insert ring for 21 days then remove for one week  3 each 4   • hydrOXYzine HCL (ATARAX) 25 mg tablet Take 1 tablet (25 mg total) by mouth every 6 (six) hours as needed for anxiety 30 tablet 0   • indomethacin (INDOCIN) 25 mg capsule 1-2 tabs BID prn severe headache with food  Hold toradol  30 capsule 0   • magnesium Oxide (MAG-OX) 400 mg TABS Take 1 tablet (400 mg total) by mouth daily 30 tablet 0   • OLANZapine (ZyPREXA) 2 5 mg tablet Take 1 tablet (2 5 mg total) by mouth daily Do not take with reglan  30 tablet 2   • olmesartan (BENICAR) 20 mg tablet Take 1 tablet (20 mg total) by mouth daily (Patient not taking: Reported on 11/8/2022) 90 tablet 0   • onabotulinumtoxin A (BOTOX) 100 units Inject as directed     • ondansetron (ZOFRAN) 4 mg tablet      • ondansetron (ZOFRAN-ODT) 4 mg disintegrating tablet Take 1 tablet (4 mg total) by mouth every 6 (six) hours as needed for nausea or vomiting for up to 6 doses 6 tablet 0   • pantoprazole (PROTONIX) 40 mg tablet Take 1 tablet (40 mg total) by mouth daily 30 tablet 3   • pramipexole (MIRAPEX) 0 25 mg tablet Take 1 tablet (0 25 mg total) by mouth daily at bedtime 90 tablet 1   • prazosin (MINIPRESS) 2 mg capsule Take 1 capsule (2 mg total) by mouth daily at bedtime 30 capsule 2   • Syringe/Needle, Disp, (SYRINGE 3CC/58GF8-5/2") 25G X 1-1/2" 3 ML MISC Use for B12 IM injections  4 each 0   • Trudhesa 0 725 MG/ACT AERS 1 spray in each nostril for total dose of 1 45mg, may repeat 1 dose after 1 hour  Max 2 doses per 24 hours and 3 doses per week   12 mL 0   • Upadacitinib ER (Rinvoq) 15 MG TB24 Take 15 mg by mouth daily       Current Facility-Administered Medications   Medication Dose Route Frequency Provider Last Rate Last Admin   • cyanocobalamin injection 1,000 mcg  1,000 mcg Intramuscular Q30 Days [facility], DO   1,000 mcg at 08/03/20 8713   • cyanocobalamin injection 1,000 mcg  1,000 mcg Intramuscular Q14 Days [facility], DO   1,000 mcg at 05/18/20 1146   • cyanocobalamin injection 1,000 mcg  1,000 mcg Intramuscular Q30 Days [facility], DO   1,000 mcg at 09/01/20 1129        Allergies   Allergen Reactions   • Cimzia [Certolizumab Pegol] Swelling   • Desvenlafaxine      Other reaction(s): state of confusion   • Ixekizumab Vomiting   • Valproic Acid Other (See Comments)     Other reaction(s): dilated pupils, "schizi"   • Voltaren [Diclofenac] Swelling   • Tizanidine Anxiety     Review of Systems    Video Exam    There were no vitals filed for this visit  Physical Exam     Behavioral Health Psychotherapy Progress Note    Psychotherapy Provided: Individual Psychotherapy     1  Depression, major, recurrent, moderate ([facility] [address] )        2  Generalized anxiety disorder            Goals addressed in session: Goal 1     DATA: Met with Stephanie for scheduled individual session  Oli Acevedo discussed her medical issues and her follow up with her providers  She states that she believes that the accident might have exacerbated a flare-up of her underlying inflammatory disease  Oli Acevedo discussed her difficulty with managing her weight-- "both physically and mentally " She states that she is "trying to be nice to myself " We discussed her desire to exercise and lose weight  She states that she feels that she is physically unable to exercise  We discussed starting slowly and gently-- to increase movement on a daily basis  Oli Acevedo discussed her relationship with her best friend  She feels that her best friend does not take her medical issues seriously, which is upsetting to her  We discussed the possibility of her having a discussion with her about this issue       During this session, this clinician used the following therapeutic modalities: Client-centered Therapy, Dialectical Behavior Therapy, Mindfulness-based Strategies, Motivational Interviewing, Solution-Focused Therapy and Supportive Psychotherapy    Substance Abuse was not addressed during this session  If the client is diagnosed with a co-occurring substance use disorder, please indicate any changes in the frequency or amount of use: n/a  Stage of change for addressing substance use diagnoses: No substance use/Not applicable    ASSESSMENT:  Janiya Marinelli presents with a Dysthymic mood  her affect is Normal range and intensity, which is congruent, with her mood and the content of the session  The client has not made progress on their goals since our last session  Janiya Marinelli presents with a minimal risk of suicide, minimal risk of self-harm, and minimal risk of harm to others  For any risk assessment that surpasses a "low" rating, a safety plan must be developed  A safety plan was indicated: no  If yes, describe in detail n/a    PLAN: Between sessions, Janiya Marinelli will increase her movement-- by walking, stretching, etc  on a daily basis  She will monitor her moods and her negative self-talk  If she engages in negative self-talk, she will stop herself (STOP skill) and reframe the thoughts  At the next session, the therapist will use Client-centered Therapy, Dialectical Behavior Therapy, Mindfulness-based Strategies, Motivational Interviewing, Solution-Focused Therapy and Supportive Psychotherapy to address her mood regulation and relationship concerns  Behavioral Health Treatment Plan and Discharge Planning: Janiya Marinelli is aware of and agrees to continue to work on their treatment plan  They have identified and are working toward their discharge goals   yes    Visit start and stop times:    03/17/23  Start Time: 0807  Stop Time: 3718  Total Visit Time: 47 minutes

## 2023-03-22 ENCOUNTER — TELEPHONE (OUTPATIENT)
Dept: NEUROLOGY | Facility: CLINIC | Age: 33
End: 2023-03-22

## 2023-03-22 NOTE — TELEPHONE ENCOUNTER
----- Message from Su Rosas, [address] sent at 3/21/2023  2:40 PM EDT -----  Regarding: Medicine  Contact: [phone]  Please review      ----- Message -----  From: Deanna Webb"  Sent: 3/21/2023   2:22 PM EDT  To: [facility]  Subject: Medicine                                         Hi!  I just got a called from a Pharmacy about Yamil Adamson? My insurance denied it before so I was unsure if the office did an appeal. I am suppose to be getting the [place] tomorrow. So im not sure what I am suppose to do or take.    Thank you Nutrition Assessment   Assessment Type:   Initial assessment    Reason for Visit:   Registered Dietitian Evaluation    Chart Medications Lab Results Reviewed:  yes     Nutritional Risk Factors:   High risk diagnosis    Current Diet Order: General   Diet Tolerance: Had not tried at time of visit   Holiness / Cultural Preferences: N/A  Food Allergies: no  Priority Points: Status 2    Demographic/Anthropometrics Information  Gender: male   Patient Age: 86 year old  Height:    Ht Readings from Last 1 Encounters:   01/23/20 5' 6\" (1.676 m)      Weight:   Wt Readings from Last 1 Encounters:   01/23/20 79.5 kg      BMI:   BMI Readings from Last 1 Encounters:   01/23/20 28.29 kg/m²     Ideal Body Weight: 63.8 kg  Usual Body Weight: 75.8 kg 10/2019  % Weight change: +4.6 %    Reason for Weight Change: Other:Unclear    Physical Appearance: Muscle wasting Mild     Weight Classification: Overweight (BMI 25-29.9)    Estimated Nutritional Needs  Assessment Weight: 79.5  kg  Energy Needs: 25-30 kcal/kg   9668-2886 kcal/day  Protein Needs: 1-1.2 g/kg  80-95 grams/day    Nutrition Diagnosis (PES)  Inadequate oral intake related to Altered GI function/GI disorder as evidenced by Documented/reported poor oral intake      Nutrition Plan  Current Nutrition Therapy: Diet    Continue Nutrition Therapy: Diet    Recommended Nutrition Intervention: Coordination of nutrition care by a nutrition professional, Meals and snacks  and nutrition supplemental therapy    Monitor: Biochemical data, medical tests, procedures, Food and beverage intake and Weight    Recommend: Continue current nutrition therapy    Discharge Needs: Pending    Care Plan Discussed With: Pt unable to participate in plan of care    Goals: Increase oral intake to >/=50%of meals and supplements    Goal Progress: Initiated    Timeframe to Achieve Goal: 3-5 days    Dietitian Notes/Impressions/Recommendations:  Patient is a 86 year old M with PMH that includes aortic dissection,  COPD, HTN, dementia who presented with abdominal pain. Found with perforated gallbladder, s/p open mickey on 1/25. Also with cirrhosis.     1/29: Patient seen for LOS, seen for high risk dx. POD #4.  Discussed with wife at bedside who states patient's appetite is fair/good at home some variability likely related to dementia. No weight loss per EMR, partial thickness wound on buttocks.Patient seen this morning while working with PT, per RN ate soup and ice cream. Wife states that patient has tried Ensure in the past.     PLAN/RECOMMENDATIONS  1. Current diet: General, no carbonation per MD   2. Oral nutrition supplement: Will add Ensure Enlive (chocolate) BID   3. RD following intake trends, weight, labs.  Further recommendations based on clinical course.    Malnutrition Status: Does not meet criteria at this time

## 2023-03-22 NOTE — TELEPHONE ENCOUNTER
Patient said she received a phone call recently to welcome her into the vyepti program (was not from her insurance); she said she sent a my chart message because she wasn't sure if she should be taking action; she is ware vyetpi was denied until she tried emgality. She said she is happy with current treatment plan and is due for a shipment of trudhesa; no further needs at this time.

## 2023-03-24 PROBLEM — J01.00 ACUTE NON-RECURRENT MAXILLARY SINUSITIS: Status: RESOLVED | Noted: 2023-01-23 | Resolved: 2023-03-24

## 2023-03-27 ENCOUNTER — TELEPHONE (OUTPATIENT)
Dept: NEUROLOGY | Facility: CLINIC | Age: 33
End: 2023-03-27

## 2023-03-27 NOTE — TELEPHONE ENCOUNTER
Patient's Pharmacy calling to confirm if patient is still taking a medication  Please call to confirm

## 2023-03-28 ENCOUNTER — TELEPHONE (OUTPATIENT)
Dept: PSYCHIATRY | Facility: CLINIC | Age: 33
End: 2023-03-28

## 2023-03-28 NOTE — TELEPHONE ENCOUNTER
LVM  Since Dr Nishant Milligan will be all virtual that day, pt's appt has been changed  If pt prefers to come into the office, please transfer the call to me 
I have personally seen and examined this patient.  I have fully participated in the care of this patient. I have reviewed all pertinent clinical information, including history, physical exam, plan and the Resident’s note and agree except as noted.

## 2023-03-28 NOTE — TELEPHONE ENCOUNTER
Returned call to perform specialty; they wanted to update our office that patient said she is no longer taking rinvoq; I let him know it was [facility] managing same; Nurse at perform then did find [facility] provider name on the prescription and will call them to clarify

## 2023-03-30 ENCOUNTER — OFFICE VISIT (OUTPATIENT)
Dept: NEUROLOGY | Facility: CLINIC | Age: 33
End: 2023-03-30

## 2023-03-30 VITALS
HEART RATE: 96 BPM | TEMPERATURE: 97.3 F | SYSTOLIC BLOOD PRESSURE: 124 MMHG | WEIGHT: 260 LBS | DIASTOLIC BLOOD PRESSURE: 84 MMHG | HEIGHT: 63 IN | BODY MASS INDEX: 46.07 KG/M2

## 2023-03-30 DIAGNOSIS — M79.18 MYOFASCIAL MUSCLE PAIN: ICD-10-CM

## 2023-03-30 DIAGNOSIS — R25.1 PHYSIOLOGICAL TREMOR: ICD-10-CM

## 2023-03-30 DIAGNOSIS — M26.622 TENDERNESS OF LEFT TEMPOROMANDIBULAR JOINT: ICD-10-CM

## 2023-03-30 DIAGNOSIS — G43.709 CHRONIC MIGRAINE WITHOUT AURA WITHOUT STATUS MIGRAINOSUS, NOT INTRACTABLE: Primary | ICD-10-CM

## 2023-03-30 DIAGNOSIS — R11.0 NAUSEA: ICD-10-CM

## 2023-03-30 RX ORDER — ACETAMINOPHEN 160 MG
2000 TABLET,DISINTEGRATING ORAL DAILY
COMMUNITY
Start: 2023-01-26

## 2023-03-30 RX ORDER — FERROUS SULFATE 325(65) MG
1 TABLET ORAL DAILY
COMMUNITY
Start: 2023-01-26

## 2023-03-30 RX ORDER — ONDANSETRON 4 MG/1
4 TABLET, ORALLY DISINTEGRATING ORAL EVERY 6 HOURS PRN
Qty: 90 TABLET | Refills: 0 | Status: SHIPPED | OUTPATIENT
Start: 2023-03-30

## 2023-03-30 RX ORDER — INDOMETHACIN 25 MG/1
CAPSULE ORAL
Qty: 30 CAPSULE | Refills: 0 | Status: SHIPPED | OUTPATIENT
Start: 2023-03-30

## 2023-03-30 NOTE — PATIENT INSTRUCTIONS
Massage:  [facility]- [address] [facility]- [phone]    Acupuncture:  Chiara Bonilla- @[facility]- [address] [facility]- [address] [place] [address] [phone]  [address]   [facility]    [facility]- Floyce How in [facility]      Botox Therapy  Important Information    Our goal is to make sure you fully understand how Botox Therapy treatment may benefit you and to help you understand how you can play an active role in your treatments and ongoing care  Please review the following information below  Call our office IMMEDIATELY @ [phone] and speak to one of our Botox Coordinators if you have a change in insurance  (a prior authorization is required and accurate information is vital)  Please call at least 24 hours in advance if you can't make your appointment  Appointments are scheduled every 91 days (this will be scheduled in advance before leaving the office)  You must allow for at least 2-3 treatments to determine if Botox is right for you  It may take a few weeks to see a response from treatment  No hair dye or scalp massage or treatment within 24 hours of treatment  We encourage you to use a headache diary or journal to document your headache frequency and severity  You can also utilize the Migraine Eliezer mallika (downloadable on [employer])  Sign up for the Botox Savings Program  (commercial insurance patients may qualify) Sign up at [employer] Website or call [phone] Option: 4  To get more information on Botox therapy for Chronic Migraines and see frequently asked questions, please visit [facility]  If you have any questions or concerns, please speak to one of our Botox Coordinators at [phone]  We look forward to servicing you!
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
## 2023-03-31 ENCOUNTER — TELEMEDICINE (OUTPATIENT)
Dept: BEHAVIORAL/MENTAL HEALTH CLINIC | Facility: CLINIC | Age: 33
End: 2023-03-31

## 2023-03-31 DIAGNOSIS — F41.1 GENERALIZED ANXIETY DISORDER: Chronic | ICD-10-CM

## 2023-03-31 DIAGNOSIS — F33.1 DEPRESSION, MAJOR, RECURRENT, MODERATE (HCC): Primary | Chronic | ICD-10-CM
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
## 2023-04-06 DIAGNOSIS — F41.1 GENERALIZED ANXIETY DISORDER: Chronic | ICD-10-CM

## 2023-04-06 RX ORDER — BUPROPION HYDROCHLORIDE 300 MG/1
300 TABLET ORAL DAILY
Qty: 90 TABLET | Refills: 0 | Status: SHIPPED | OUTPATIENT
Start: 2023-04-06

## 2023-04-06 NOTE — TELEPHONE ENCOUNTER
Medication Refill Request     Name of Medication Bupropion   Dose/Frequency 300 mg/ Take 1 tablet by mouth daily  Quantity 90  Verified pharmacy   [x]  Verified ordering Provider   [x]  Does patient have enough for the next 3 days? Yes [] No [x]  Does patient have a follow-up appointment scheduled?  Yes [x] No []   If so when is appointment: 4/7/2023

## 2023-04-07 ENCOUNTER — TELEMEDICINE (OUTPATIENT)
Dept: PSYCHIATRY | Facility: CLINIC | Age: 33
End: 2023-04-07

## 2023-04-07 DIAGNOSIS — G43.709 CHRONIC MIGRAINE WITHOUT AURA WITHOUT STATUS MIGRAINOSUS, NOT INTRACTABLE: ICD-10-CM

## 2023-04-07 DIAGNOSIS — F41.1 GENERALIZED ANXIETY DISORDER: Chronic | ICD-10-CM

## 2023-04-07 DIAGNOSIS — F33.1 DEPRESSION, MAJOR, RECURRENT, MODERATE (HCC): Primary | Chronic | ICD-10-CM

## 2023-04-07 RX ORDER — OLANZAPINE 2.5 MG/1
2.5 TABLET ORAL DAILY
Qty: 30 TABLET | Refills: 2 | Status: SHIPPED | OUTPATIENT
Start: 2023-04-07

## 2023-04-07 RX ORDER — ESCITALOPRAM OXALATE 20 MG/1
20 TABLET ORAL DAILY
Qty: 90 TABLET | Refills: 0 | Status: SHIPPED | OUTPATIENT
Start: 2023-04-07

## 2023-04-07 RX ORDER — ALPRAZOLAM 0.5 MG/1
0.5 TABLET ORAL 2 TIMES DAILY PRN
Qty: 60 TABLET | Refills: 2 | Status: SHIPPED | OUTPATIENT
Start: 2023-04-07

## 2023-04-07 NOTE — PSYCH
Virtual Regular Visit    Verification of patient location:    Patient is located in the following state in which I hold an active license [place]      Assessment/Plan:    Problem List Items Addressed This Visit        Cardiovascular and Mediastinum    Migraine headache    Relevant Medications    OLANZapine (ZyPREXA) 2 5 mg tablet    escitalopram (LEXAPRO) 20 mg tablet       Other    Depression, major, recurrent, moderate (HCC) - Primary (Chronic)    Relevant Medications    ALPRAZolam (XANAX) 0 5 mg tablet    OLANZapine (ZyPREXA) 2 5 mg tablet    escitalopram (LEXAPRO) 20 mg tablet    Generalized anxiety disorder (Chronic)    Relevant Medications    ALPRAZolam (XANAX) 0 5 mg tablet    OLANZapine (ZyPREXA) 2 5 mg tablet    escitalopram (LEXAPRO) 20 mg tablet                Reason for visit is   Chief Complaint   Patient presents with   • Virtual Regular Visit        Encounter provider Esa Evans MD    Provider located at [address] [place] [id number] [phone]      Recent Visits  No visits were found meeting these conditions  Showing recent visits within past 7 days and meeting all other requirements  Today's Visits  Date Type Provider Dept   04/07/23 MD Jami Ziegler 19 today's visits and meeting all other requirements  Future Appointments  No visits were found meeting these conditions  Showing future appointments within next 150 days and meeting all other requirements       The patient was identified by name and date of birth  Jayda Orozco was informed that this is a telemedicine visit and that [facility]  She agrees to proceed     My office door was closed  No one else was in the room  She acknowledged consent and understanding of privacy and security of the video platform   The patient has agreed to participate and understands they can discontinue the visit at any time  Patient is aware this is a billable service  Marcelina Ross is a 33 y o  female with MDD and BESSY and panic do   Grace Goon stated she continues to struggle with anxiety and frequent panic attacks  She tried switching to Xanax XR and her dose was titrated from 0 5 to 2 mg daily gradually  She feels the XR is less effective for her compared with her previous IR 0 5 mg bid prn  She will like to switch back to Xanax 0 5 mg po bid prn    She continues to meet with her counselor on a regular basis  She denies recent health changes or new medications  She agrees [facility] [place] stated  treatment and will schedule follow up in 3 months or sooner if needed  HPI     Past Medical History:   Diagnosis Date   • Abnormal Pap smear of cervix    • Allergic    • Anxiety    • Arthritis    • Depression    • Diabetes mellitus ([facility] [address] )    • Fibromyalgia, primary    • Hypertension    • IBS (irritable bowel syndrome)    • Migraine    • Obesity    • Vitamin B12 deficiency        Past Surgical History:   Procedure Laterality Date   • COLONOSCOPY N/A 5/8/2018    Procedure: COLONOSCOPY;  Surgeon: South Hidalgo MD;  Location: [facility] GI LAB;   Service: Gastroenterology   • NC EXC B9 LESION MRGN XCP SK TG S/N/H/F/G > 4 0CM N/A 7/1/2021    Procedure: EXCISION OF PILAR SCALP CYST X 2 AND RIGHT INNER GROIN NEVVUS;  Surgeon: Marylen Rodes, MD;  Location: [facility] MAIN OR;  Service: Plastics   • NC REPAIR COMPLEX SCALP/ARM/LEG 2 6-7 5 CM N/A 7/1/2021    Procedure: CLOSURE WOUND SCALP X 2 AND RIGHT INNER GROIN;  Surgeon: Marylen Rodes, MD;  Location:  MAIN OR;  Service: Plastics   • TONSILLECTOMY     • WISDOM TOOTH EXTRACTION         Current Outpatient Medications   Medication Sig Dispense Refill   • ALPRAZolam (XANAX) 0 5 mg tablet Take 1 tablet (0 5 mg total) by mouth 2 (two) times a day as needed for anxiety 60 tablet 2   • escitalopram (LEXAPRO) 20 mg "tablet Take 1 tablet (20 mg total) by mouth daily 90 tablet 0   • OLANZapine (ZyPREXA) 2 5 mg tablet Take 1 tablet (2 5 mg total) by mouth daily Do not take with reglan  30 tablet 2   • Aimovig 140 MG/ML SOAJ Inject 140mg (1 pen) under the skin every 30 days  1 mL 11   • buPROPion (WELLBUTRIN XL) 300 mg 24 hr tablet Take 1 tablet (300 mg total) by mouth daily 90 tablet 0   • Cholecalciferol (Vitamin D3) 50 MCG (2000 UT) capsule Take 2,000 Units by mouth daily     • cyanocobalamin 1,000 mcg/mL 1 IM injection every week x 4 weeks, then 1 every month 4 mL 2   • dicyclomine (BENTYL) 20 mg tablet Take 1 tablet (20 mg total) by mouth 2 (two) times a day for 7 days 14 tablet 0   • etonogestrel-ethinyl estradiol (NuvaRing) 0 12-0 015 MG/24HR vaginal ring Insert ring for 21 days then remove for one week  3 each 4   • FeroSul 325 (65 Fe) MG tablet Take 1 tablet by mouth in the morning     • indomethacin (INDOCIN) 25 mg capsule 1-2 tabs BID prn severe headache with food  Hold toradol  30 capsule 0   • magnesium Oxide (MAG-OX) 400 mg TABS Take 1 tablet (400 mg total) by mouth daily 30 tablet 0   • onabotulinumtoxin A (BOTOX) 100 units Inject as directed     • ondansetron (ZOFRAN-ODT) 4 mg disintegrating tablet Take 1 tablet (4 mg total) by mouth every 6 (six) hours as needed for nausea or vomiting 90 tablet 0   • pantoprazole (PROTONIX) 40 mg tablet Take 1 tablet (40 mg total) by mouth daily 30 tablet 3   • pramipexole (MIRAPEX) 0 25 mg tablet Take 1 tablet (0 25 mg total) by mouth daily at bedtime 90 tablet 1   • prazosin (MINIPRESS) 2 mg capsule Take 1 capsule (2 mg total) by mouth daily at bedtime 30 capsule 2   • Syringe/Needle, Disp, (SYRINGE 3CC/90ZO5-4/2\") 25G X 1-1/2\" 3 ML MISC Use for B12 IM injections  4 each 0   • Trudhesa 0 725 MG/ACT AERS 1 spray in each nostril for total dose of 1 45mg, may repeat 1 dose after 1 hour  Max 2 doses per 24 hours and 3 doses per week   12 mL 0     Current Facility-Administered " "Medications   Medication Dose Route Frequency Provider Last Rate Last Admin   • cyanocobalamin injection 1,000 mcg  1,000 mcg Intramuscular Q30 Days Buckhorn Ulrich, DO   1,000 mcg at 08/03/20 3345   • cyanocobalamin injection 1,000 mcg  1,000 mcg Intramuscular Q14 Days Buckhorn Ulrich, DO   1,000 mcg at 05/18/20 1146   • cyanocobalamin injection 1,000 mcg  1,000 mcg Intramuscular Q30 Days Buckhorn Ulrich, DO   1,000 mcg at 09/01/20 1129        Allergies   Allergen Reactions   • Cimzia [Certolizumab Pegol] Swelling   • Desvenlafaxine      Other reaction(s): state of confusion   • Ixekizumab Vomiting   • Valproic Acid Other (See Comments)     Other reaction(s): dilated pupils, \"schizi\"   • Voltaren [Diclofenac] Swelling   • Tizanidine Anxiety       Review of Systems     Mood Anxiety and Depression   Behavior Normal    Thought Content Disturbing Thoughts, Feelings   General Emotional Problems and Decreased Functioning   Personality Normal   Other Psych Symptoms Normal   Constitutional Negative   ENT Negative   Cardiovascular Negative   Respiratory Negative   Gastrointestinal Negative   Genitourinary Negative   Musculoskeletal Negative   Integumentary Negative   Neurological Negative   Endocrine Normal    Other Symptoms Normal        Laboratory Results:   Recent Labs (last 12 months):    Admission on 02/19/2023, Discharged on 02/19/2023   Component Date Value   • WBC 02/19/2023 11 92 (H)    • RBC 02/19/2023 4 54    • Hemoglobin 02/19/2023 12 7    • Hematocrit 02/19/2023 38 8    • MCV 02/19/2023 86    • MCH 02/19/2023 28 0    • MCHC 02/19/2023 32 7    • RDW 02/19/2023 16 6 (H)    • MPV 02/19/2023 8 8 (L)    • Platelets 13/59/2644 482 (H)    • nRBC 02/19/2023 0    • Neutrophils Relative 02/19/2023 59    • Immat GRANS % 02/19/2023 0    • Lymphocytes Relative 02/19/2023 34    • Monocytes Relative 02/19/2023 6    • Eosinophils Relative 02/19/2023 1    • Basophils Relative 02/19/2023 0    • Neutrophils Absolute 02/19/2023 7 03    • " Immature Grans Absolute 02/19/2023 0 04    • Lymphocytes Absolute 02/19/2023 3 99    • Monocytes Absolute 02/19/2023 0 76    • Eosinophils Absolute 02/19/2023 0 06    • Basophils Absolute 02/19/2023 0 04    • Sodium 02/19/2023 132 (L)    • Potassium 02/19/2023 4 1    • Chloride 02/19/2023 103    • CO2 02/19/2023 21    • ANION GAP 02/19/2023 8    • BUN 02/19/2023 14    • Creatinine 02/19/2023 0 72    • Glucose 02/19/2023 106    • Calcium 02/19/2023 9 5    • AST 02/19/2023 15    • ALT 02/19/2023 13    • Alkaline Phosphatase 02/19/2023 46    • Total Protein 02/19/2023 6 9    • Albumin 02/19/2023 3 9    • Total Bilirubin 02/19/2023 0 50    • eGFR 02/19/2023 111    • Lipase 02/19/2023 31    • EXT Preg Test, Ur 02/19/2023 Negative    • Control 02/19/2023 Valid    • Color, UA 02/19/2023 Yellow    • Clarity, UA 02/19/2023 Clear    • Specific Gravity, UA 02/19/2023 >=1 030 (H)    • pH, UA 02/19/2023 5 5    • Leukocytes, UA 02/19/2023 Negative    • Nitrite, UA 02/19/2023 Negative    • Protein, UA 02/19/2023 Negative    • Glucose, UA 02/19/2023 Negative    • Ketones, UA 02/19/2023 Negative    • Urobilinogen, UA 02/19/2023 0 2    • Bilirubin, UA 02/19/2023 Negative    • Occult Blood, UA 02/19/2023 Negative    Admission on 01/29/2023, Discharged on 01/29/2023   Component Date Value   • WBC 01/29/2023 13 78 (H)    • RBC 01/29/2023 4 09    • Hemoglobin 01/29/2023 11 2 (L)    • Hematocrit 01/29/2023 34 3 (L)    • MCV 01/29/2023 84    • MCH 01/29/2023 27 4    • MCHC 01/29/2023 32 7    • RDW 01/29/2023 15 6 (H)    • MPV 01/29/2023 8 6 (L)    • Platelets 99/50/9829 484 (H)    • nRBC 01/29/2023 0    • Neutrophils Relative 01/29/2023 49    • Immat GRANS % 01/29/2023 0    • Lymphocytes Relative 01/29/2023 43    • Monocytes Relative 01/29/2023 7    • Eosinophils Relative 01/29/2023 1    • Basophils Relative 01/29/2023 0    • Neutrophils Absolute 01/29/2023 6 65    • Immature Grans Absolute 01/29/2023 0 04    • Lymphocytes Absolute 01/29/2023 5 88 (H)    • Monocytes Absolute 01/29/2023 1 02    • Eosinophils Absolute 01/29/2023 0 15    • Basophils Absolute 01/29/2023 0 04    • Sodium 01/29/2023 135    • Potassium 01/29/2023 3 4 (L)    • Chloride 01/29/2023 104    • CO2 01/29/2023 21    • ANION GAP 01/29/2023 10    • BUN 01/29/2023 11    • Creatinine 01/29/2023 0 78    • Glucose 01/29/2023 85    • Calcium 01/29/2023 8 7    • eGFR 01/29/2023 101    • SARS-CoV-2 01/29/2023 Negative    • INFLUENZA A PCR 01/29/2023 Negative    • INFLUENZA B PCR 01/29/2023 Negative    • RSV PCR 01/29/2023 Negative    • Color, UA 01/29/2023 Yellow    • Clarity, UA 01/29/2023 Cloudy (A)    • Specific Gravity, UA 01/29/2023 1 025    • pH, UA 01/29/2023 6 0    • Leukocytes, UA 01/29/2023 Trace (A)    • Nitrite, UA 01/29/2023 Negative    • Protein, UA 01/29/2023 Negative    • Glucose, UA 01/29/2023 Negative    • Ketones, UA 01/29/2023 Negative    • Urobilinogen, UA 01/29/2023 0 2    • Bilirubin, UA 01/29/2023 Negative    • Occult Blood, UA 01/29/2023 1+ (A)    • EXT Preg Test, Ur 01/29/2023 Negative    • Control 01/29/2023 Valid    • RBC, UA 01/29/2023 0-1    • WBC, UA 01/29/2023 2-4    • Epithelial Cells 01/29/2023 Moderate (A)    • Bacteria, UA 01/29/2023 Occasional    • MUCUS THREADS 01/29/2023 Occasional (A)    • Total Bilirubin 01/29/2023 0 50    • Bilirubin, Direct 01/29/2023 0 04    • Alkaline Phosphatase 01/29/2023 55    • AST 01/29/2023 9 (L)    • ALT 01/29/2023 10    • Total Protein 01/29/2023 6 5    • Albumin 01/29/2023 3 6    • Magnesium 01/29/2023 2 0    • Monotest 01/29/2023 Negative    Admission on 12/02/2022, Discharged on 12/02/2022   Component Date Value   • WBC 12/02/2022 9 29    • RBC 12/02/2022 4 22    • Hemoglobin 12/02/2022 11 4 (L)    • Hematocrit 12/02/2022 35 7    • MCV 12/02/2022 85    • MCH 12/02/2022 27 0    • MCHC 12/02/2022 31 9    • RDW 12/02/2022 13 9    • MPV 12/02/2022 8 9    • Platelets 11/13/0159 515 (H)    • nRBC 12/02/2022 0    • Neutrophils Relative 12/02/2022 48    • Immat GRANS % 12/02/2022 0    • Lymphocytes Relative 12/02/2022 39    • Monocytes Relative 12/02/2022 9    • Eosinophils Relative 12/02/2022 3    • Basophils Relative 12/02/2022 1    • Neutrophils Absolute 12/02/2022 4 46    • Immature Grans Absolute 12/02/2022 0 03    • Lymphocytes Absolute 12/02/2022 3 66    • Monocytes Absolute 12/02/2022 0 79    • Eosinophils Absolute 12/02/2022 0 30    • Basophils Absolute 12/02/2022 0 05    • Sodium 12/02/2022 137    • Potassium 12/02/2022 3 9    • Chloride 12/02/2022 110 (H)    • CO2 12/02/2022 20 (L)    • ANION GAP 12/02/2022 7    • BUN 12/02/2022 7    • Creatinine 12/02/2022 0 66    • Glucose 12/02/2022 86    • Calcium 12/02/2022 9 1    • Corrected Calcium 12/02/2022 10 0    • AST 12/02/2022 17    • ALT 12/02/2022 18    • Alkaline Phosphatase 12/02/2022 66    • Total Protein 12/02/2022 7 3    • Albumin 12/02/2022 2 9 (L)    • Total Bilirubin 12/02/2022 0 28    • eGFR 12/02/2022 118    • hs TnI 0hr 12/02/2022 <2    • Ventricular Rate 12/02/2022 81    • Atrial Rate 12/02/2022 81    • LA Interval 12/02/2022 166    • QRSD Interval 12/02/2022 90    • QT Interval 12/02/2022 378    • QTC Interval 12/02/2022 439    • P Axis 12/02/2022 39    • QRS Axis 12/02/2022 21    • T Wave Uneeda 12/02/2022 25    Admission on 11/25/2022, Discharged on 11/25/2022   Component Date Value   • D-Dimer, Quant 11/25/2022 0 51 (H)    • WBC 11/25/2022 7 16    • RBC 11/25/2022 3 87    • Hemoglobin 11/25/2022 10 8 (L)    • Hematocrit 11/25/2022 33 2 (L)    • MCV 11/25/2022 86    • MCH 11/25/2022 27 9    • MCHC 11/25/2022 32 5    • RDW 11/25/2022 13 5    • MPV 11/25/2022 8 6 (L)    • Platelets 05/73/0130 428 (H)    • nRBC 11/25/2022 0    • Neutrophils Relative 11/25/2022 54    • Immat GRANS % 11/25/2022 0    • Lymphocytes Relative 11/25/2022 37    • Monocytes Relative 11/25/2022 6    • Eosinophils Relative 11/25/2022 3    • Basophils Relative 11/25/2022 0    • Neutrophils Absolute 11/25/2022 3 80    • Immature Grans Absolute 11/25/2022 0 02    • Lymphocytes Absolute 11/25/2022 2 68    • Monocytes Absolute 11/25/2022 0 42    • Eosinophils Absolute 11/25/2022 0 22    • Basophils Absolute 11/25/2022 0 02    • Sodium 11/25/2022 135    • Potassium 11/25/2022 4 0    • Chloride 11/25/2022 105    • CO2 11/25/2022 19 (L)    • ANION GAP 11/25/2022 11    • BUN 11/25/2022 8    • Creatinine 11/25/2022 0 57 (L)    • Glucose 11/25/2022 92    • Calcium 11/25/2022 9 0    • eGFR 11/25/2022 123    • Ventricular Rate 11/25/2022 82    • Atrial Rate 11/25/2022 82    • [place] Interval 11/25/2022 162    • QRSD Interval 11/25/2022 88    • QT Interval 11/25/2022 380    • QTC Interval 11/25/2022 443    • P Axis 11/25/2022 36    • QRS Axis 11/25/2022 17    • T Wave Axis 11/25/2022 12    Annual Exam on 11/08/2022   Component Date Value   • Case Report 11/08/2022                      Value:Gynecologic Cytology Report                       Case: [id number]                                  Authorizing Provider:  Maryjo Asher MD  Collected:           11/08/2022 1344              Ordering Location:     [facility]  Received:            11/08/2022 [address] [id number]                                                           First Screen:          [facility]                                                                Specimen:    LIQUID-BASED PAP, SCREENING, Cervix                                                       • Primary Interpretation 11/08/2022 Negative for intraepithelial lesion or malignancy    • Specimen Adequacy 11/08/2022 Satisfactory for evaluation  Endocervical/transformation zone component present  • Additional Information 11/08/2022                      Value: This result contains rich text formatting which cannot be displayed here     • HPV Other HR 11/08/2022 Positive (A)    • HPV16 11/08/2022 Negative    • HPV18 11/08/2022 Negative    Office Visit on 10/09/2022   Component Date Value   •  RAPID STREP A 10/09/2022 Negative    • Throat Culture 10/09/2022 Negative for beta-hemolytic Streptococcus    Admission on 09/28/2022, Discharged on 09/28/2022   Component Date Value   • EXT PREG TEST UR (Ref: N* 09/28/2022 Negative    • Control 09/28/2022 Valid    Admission on 08/23/2022, Discharged on 08/25/2022   Component Date Value   • WBC 08/23/2022 9 67    • RBC 08/23/2022 3 88    • Hemoglobin 08/23/2022 11 2 (L)    • Hematocrit 08/23/2022 33 5 (L)    • MCV 08/23/2022 86    • MCH 08/23/2022 28 9    • MCHC 08/23/2022 33 4    • RDW 08/23/2022 14 6    • MPV 08/23/2022 8 8 (L)    • Platelets 58/96/3249 487 (H)    • nRBC 08/23/2022 0    • Neutrophils Relative 08/23/2022 50    • Immat GRANS % 08/23/2022 0    • Lymphocytes Relative 08/23/2022 41    • Monocytes Relative 08/23/2022 8    • Eosinophils Relative 08/23/2022 1    • Basophils Relative 08/23/2022 0    • Neutrophils Absolute 08/23/2022 4 74    • Immature Grans Absolute 08/23/2022 0 03    • Lymphocytes Absolute 08/23/2022 4 00    • Monocytes Absolute 08/23/2022 0 73    • Eosinophils Absolute 08/23/2022 0 14    • Basophils Absolute 08/23/2022 0 03    • Sodium 08/23/2022 137    • Potassium 08/23/2022 3 7    • Chloride 08/23/2022 108    • CO2 08/23/2022 23    • ANION GAP 08/23/2022 6    • BUN 08/23/2022 6    • Creatinine 08/23/2022 0 77    • Glucose 08/23/2022 73    • Calcium 08/23/2022 9 4    • Corrected Calcium 08/23/2022 10 2 (H)    • AST 08/23/2022 10    • ALT 08/23/2022 14    • Alkaline Phosphatase 08/23/2022 73    • Total Protein 08/23/2022 7 3    • Albumin 08/23/2022 3 0 (L)    • Total Bilirubin 08/23/2022 0 36    • eGFR 08/23/2022 103    • Preg, Serum 08/23/2022 Negative    • Sed Rate 08/24/2022 44 (H)    • Sodium 08/24/2022 136    • Potassium 08/24/2022 3 9    • Chloride 08/24/2022 105    • CO2 08/24/2022 19 (L)    • ANION GAP 08/24/2022 12    • BUN 08/24/2022 7    • Creatinine 08/24/2022 0 96    • Glucose 08/24/2022 180 (H)    • Calcium 08/24/2022 8 9    • eGFR 08/24/2022 79    • WBC 08/24/2022 8 74    • RBC 08/24/2022 4 13    • Hemoglobin 08/24/2022 11 5    • Hematocrit 08/24/2022 35 0    • MCV 08/24/2022 85    • MCH 08/24/2022 27 8    • MCHC 08/24/2022 32 9    • RDW 08/24/2022 14 4    • Platelets 49/35/2453 502 (H)    • MPV 08/24/2022 9 0    Admission on 08/16/2022, Discharged on 08/16/2022   Component Date Value   • EXT PREG TEST UR (Ref: N* 08/16/2022 negative    • Control 08/16/2022 valid ([id number] exp:2023/10/31    Admission on 08/04/2022, Discharged on 08/04/2022   Component Date Value   • WBC 08/04/2022 14 59 (H)    • RBC 08/04/2022 4 30    • Hemoglobin 08/04/2022 12 1    • Hematocrit 08/04/2022 37 3    • MCV 08/04/2022 87    • MCH 08/04/2022 28 1    • MCHC 08/04/2022 32 4    • RDW 08/04/2022 13 7    • MPV 08/04/2022 8 7 (L)    • Platelets 85/88/1623 555 (H)    • nRBC 08/04/2022 0    • Neutrophils Relative 08/04/2022 84 (H)    • Immat GRANS % 08/04/2022 0    • Lymphocytes Relative 08/04/2022 14    • Monocytes Relative 08/04/2022 2 (L)    • Eosinophils Relative 08/04/2022 0    • Basophils Relative 08/04/2022 0    • Neutrophils Absolute 08/04/2022 12 12 (H)    • Immature Grans Absolute 08/04/2022 0 05    • Lymphocytes Absolute 08/04/2022 1 98    • Monocytes Absolute 08/04/2022 0 35    • Eosinophils Absolute 08/04/2022 0 05    • Basophils Absolute 08/04/2022 0 04    • Sodium 08/04/2022 135    • Potassium 08/04/2022 4 4    • Chloride 08/04/2022 103    • CO2 08/04/2022 22    • ANION GAP 08/04/2022 10    • BUN 08/04/2022 11    • Creatinine 08/04/2022 0 78    • Glucose 08/04/2022 97    • Calcium 08/04/2022 9 3    • AST 08/04/2022 11 (L)    • ALT 08/04/2022 11    • Alkaline Phosphatase 08/04/2022 54    • Total Protein 08/04/2022 6 7    • Albumin 08/04/2022 3 7    • Total Bilirubin 08/04/2022 0 41    • eGFR 08/04/2022 101    • EXT PREG TEST UR (Ref: N* 08/04/2022 negative    • Control 08/04/2022 valid    There may be more visits with results that are not included  Substance Abuse History:  Social History     Substance and Sexual Activity   Drug Use Yes   • Frequency: 2 0 times per week   • Types: Marijuana    Comment: medical marijuana (vape)       Family Psychiatric History:   Family History   Problem Relation Age of Onset   • Rheum arthritis Mother    • Psoriasis Mother    • Other Mother    • Hypertension Mother    • Diabetes unspecified Mother    • Sjogren's syndrome Mother    • Alcohol abuse Mother    • Drug abuse Mother    • Anxiety disorder Father    • Alcohol abuse Father    • Lung cancer Maternal Grandfather    • Cancer Other         bone   • Diabetes Other    • Other Other         High blood pressure   • Depression Maternal Grandmother        The following portions of the patient's history were reviewed and updated as appropriate: allergies, current medications, past family history, past medical history, past social history, past surgical history and problem list     Social History     Socioeconomic History   • Marital status: Single     Spouse name: Not on file   • Number of children: 0   • Years of education: 15   • Highest education level: Not on file   Occupational History   • Occupation: Unemployed     Employer: [employer]   Tobacco Use   • Smoking status: Never   • Smokeless tobacco: Never   Vaping Use   • Vaping Use: Some days   • Start date: 7/1/2019   • Substances: THC, CBD   Substance and Sexual Activity   • Alcohol use: Yes     Comment: rarely   • Drug use: Yes     Frequency: 2 0 times per week     Types: Marijuana     Comment: medical marijuana (vape)   • Sexual activity: Not Currently     Partners: Male     Comment: Nuva ring   Other Topics Concern   • Not on file   Social History Narrative    Home:  Living with mom and MGM        Education:    Pt denies any h/o learning disability Dxs but admits to having great difficulty in math requiring a     She reached childhood milestones on time as far as he knows  Graduated HS 2009    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 college art classes--she stopped due to anxiety from dissatisfaction with her classes--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 felt they were telling her what to create  On reflection, she feels it was moreso her anxiety as the cause of her leaving school, and she was using the other reason as an excuse so she would not have to admit to anxiety at that time  She is now very open about her anxiety and does not mind that I have this information in the general social section of her chart  Social Determinants of Health     Financial Resource Strain: Low Risk    • Difficulty of Paying Living Expenses: Not hard at all   Food Insecurity: No Food Insecurity   • Worried About Running Out of Food in the Last Year: Never true   • Ran Out of Food in the Last Year: Never true   Transportation Needs: No Transportation Needs   • Lack of Transportation (Medical): No   • Lack of Transportation (Non-Medical): No   Physical Activity: Not on file   Stress: Not on file   Social Connections: Not on file   Intimate Partner Violence: Not on file   Housing Stability: Not on file     Social History     Social History Narrative    Home:  Living with mom and MGM        Education:    Pt denies any h/o learning disability Dxs but admits to having great difficulty in math requiring a   She reached childhood milestones on time as far as he knows  Graduated HS 2009    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 college art classes--she stopped due to anxiety from dissatisfaction with her classes--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 felt they were telling her what to create  On reflection, she feels it was moreso her anxiety as the cause of her leaving school, and she was using the other reason as an excuse so she would not have to admit to anxiety at that time    She is now very open about her anxiety and does not mind that I have this information in the general social section of her chart  Objective:       Mental status:  Appearance calm and cooperative , adequate hygiene and grooming and good eye contact    Mood dysphoric   Affect affect was constricted   Speech a normal rate and fluent   Thought Processes coherent/organized and normal thought processes   Hallucinations no hallucinations present    Thought Content no delusions   Abnormal Thoughts no suicidal thoughts  and no homicidal thoughts    Orientation  oriented to person and place and time   Remote Memory short term memory intact and long term memory intact   Attention Span concentration intact   Intellect Appears to be of Average Intelligence   Insight Limited insight   Judgement judgment was limited   Muscle Strength n/a   Language no difficulty naming common objects and no difficulty repeating a phrase    Fund of Knowledge displays adequate knowledge of current events, adequate fund of knowledge regarding past history and adequate fund of knowledge regarding vocabulary                Assessment/Plan:       Diagnoses and all orders for this visit:    Depression, major, recurrent, moderate (HCC)    Generalized anxiety disorder  -     ALPRAZolam (XANAX) 0 5 mg tablet; Take 1 tablet (0 5 mg total) by mouth 2 (two) times a day as needed for anxiety  -     escitalopram (LEXAPRO) 20 mg tablet; Take 1 tablet (20 mg total) by mouth daily    Chronic migraine without aura without status migrainosus, not intractable  -     OLANZapine (ZyPREXA) 2 5 mg tablet; Take 1 tablet (2 5 mg total) by mouth daily Do not take with reglan              Treatment Recommendations- Risks Benefits      Immediate Medical/Psychiatric/Psychotherapy Treatments and Any Precautions: d/c Alprazolam XR, restart Alprazolam 0 5 mg po bid prn    Risks, Benefits And Possible Side Effects Of Medications:  {PSYCH RISK, BENEFITS AND POSSIBLE SIDE EFFECTS (Optional):94858    Controlled Medication Discussion: Discussed with patient Black Box warning on concurrent use of benzodiazepines and opioid medications including sedation, respiratory depression, coma and death  Patient understands the risk of treatment with benzodiazepines in addition to opioids and wants to continue taking those medications  , Discussed with patient the risks of sedation, respiratory depression, impairment of ability to drive and potential for abuse and addiction related to treatment with benzodiazepine medications  The patient understands risk of treatment with benzodiazepine medications, agrees to not drive if feels impaired and agrees to take medications as prescribed  and The patient has been filling controlled prescriptions on time as prescribed to [facility]       Psychotherapy Provided: Individual psychotherapy provided  Individual psychotherapy provided: Yes  Counseling was provided during the session today for 16 minutes  Medications, treatment progress and treatment plan reviewed with Cruz  Medication education provided to Cruz  Goals discussed during in session: improve control of anxiety and improve control of depression  Recent stressor including COVID-19 issues, relationship problems, health issues, medical problems, limited support, social difficulties, everyday stressors, ongoing anxiety and chronic mental illness discussed with Cruz  Coping strategies including compliance with medications, contacting a therapist, deep/slow breathing, eliminating avoidance, engaging in previously avoided activities, exercising, getting into a good routine, improving self-esteem, increasing energy, increasing interest in usual activities, increasing motivation, increasing social interaction, keeping busy at home, maintain healthy diet, maintain heathy sleeping hygiene and maintain positive attitude reviewed with Cruz  Importance of medication and treatment compliance reviewed with Cruz  Educated on importance of medication and treatment compliance  Importance of follow up with family physician for medical issues reviewed with Cruz  Discussed with Cruz acceptance of mental illness diagnosis and need for ongoing psychiatric treatment    Supportive therapy provided                        Visit Time    Visit Start Time: 10:00  Visit Stop Time: 10:30  Total Visit Duration: 30 minutes

## 2023-04-14 ENCOUNTER — TELEMEDICINE (OUTPATIENT)
Dept: BEHAVIORAL/MENTAL HEALTH CLINIC | Facility: CLINIC | Age: 33
End: 2023-04-14

## 2023-04-14 DIAGNOSIS — F41.1 GENERALIZED ANXIETY DISORDER: Chronic | ICD-10-CM

## 2023-04-14 DIAGNOSIS — F33.1 DEPRESSION, MAJOR, RECURRENT, MODERATE (HCC): Primary | Chronic | ICD-10-CM

## 2023-04-14 NOTE — PSYCH
Virtual Regular Visit    Verification of patient location:    Patient is located at Home in the following state in which I hold an active license [place]      Assessment/Plan:    Problem List Items Addressed This Visit        Other    Generalized anxiety disorder (Chronic)    Depression, major, recurrent, moderate ([address] ) - Primary (Chronic)       Goals addressed in session: Goal 1          Reason for visit is   Chief Complaint   Patient presents with   • Virtual Regular Visit        Encounter provider APARNA Reynaga    Provider located at [address] [place] [id number] [phone]      Recent Visits  Date Type Provider Dept   04/14/23 [address], [address]   Showing recent visits within past 7 days and meeting all other requirements  Future Appointments  No visits were found meeting these conditions  Showing future appointments within next 150 days and meeting all other requirements       The patient was identified by name and date of birth  Elzbieta Walsh was informed that this is a telemedicine visit and that the visit is being conducted throughthe [facility]  She agrees to proceed     My office door was closed  No one else was in the room  She acknowledged consent and understanding of privacy and security of the video platform  The patient has agreed to participate and understands they can discontinue the visit at any time  Patient is aware this is a billable service  Davide Hanley is a 33 y o  female      HPI     Past Medical History:   Diagnosis Date   • Abnormal Pap smear of cervix    • Allergic    • Anxiety    • Arthritis    • Depression    • Diabetes mellitus ([address] )    • Fibromyalgia, primary    • Hypertension    • IBS (irritable bowel syndrome)    • Migraine    • Obesity    • Vitamin B12 deficiency        Past Surgical History:   Procedure Laterality Date   • COLONOSCOPY N/A 5/8/2018    Procedure: COLONOSCOPY;  Surgeon: Miguel Angel Contreras MD;  Location: [facility] GI LAB; Service: Gastroenterology   • AK EXC B9 LESION MRGN XCP SK TG S/N/H/F/G > 4 0CM N/A 7/1/2021    Procedure: EXCISION OF PILAR SCALP CYST X 2 AND RIGHT INNER GROIN NEVVUS;  Surgeon: Princess Martins MD;  Location: [address] [place] [address];  Service: Plastics   • AK REPAIR COMPLEX SCALP/ARM/LEG 2 6-7 5 CM N/A 7/1/2021    Procedure: CLOSURE WOUND SCALP X 2 AND RIGHT INNER GROIN;  Surgeon: Princess Martins MD;  Location: [address] [place] [address];  Service: Plastics   • TONSILLECTOMY     • WISDOM TOOTH EXTRACTION         Current Outpatient Medications   Medication Sig Dispense Refill   • Aimovig 140 MG/ML SOAJ Inject 140mg (1 pen) under the skin every 30 days  1 mL 11   • ALPRAZolam (XANAX) 0 5 mg tablet Take 1 tablet (0 5 mg total) by mouth 2 (two) times a day as needed for anxiety 60 tablet 2   • buPROPion (WELLBUTRIN XL) 300 mg 24 hr tablet Take 1 tablet (300 mg total) by mouth daily 90 tablet 0   • Cholecalciferol (Vitamin D3) 50 MCG (2000 UT) capsule Take 2,000 Units by mouth daily     • cyanocobalamin 1,000 mcg/mL 1 IM injection every week x 4 weeks, then 1 every month 4 mL 2   • dicyclomine (BENTYL) 20 mg tablet Take 1 tablet (20 mg total) by mouth 2 (two) times a day for 7 days 14 tablet 0   • escitalopram (LEXAPRO) 20 mg tablet Take 1 tablet (20 mg total) by mouth daily 90 tablet 0   • etonogestrel-ethinyl estradiol (NuvaRing) 0 12-0 015 MG/24HR vaginal ring Insert ring for 21 days then remove for one week  3 each 4   • FeroSul 325 (65 Fe) MG tablet Take 1 tablet by mouth in the morning     • indomethacin (INDOCIN) 25 mg capsule 1-2 tabs BID prn severe headache with food  Hold toradol   30 capsule 0   • magnesium Oxide (MAG-OX) 400 mg TABS Take 1 tablet (400 mg total) by mouth daily 30 tablet 0   • OLANZapine (ZyPREXA) 2 5 mg tablet Take 1 tablet (2 5 mg total) by mouth daily Do not take "with reglan  30 tablet 2   • onabotulinumtoxin A (BOTOX) 100 units Inject as directed     • ondansetron (ZOFRAN-ODT) 4 mg disintegrating tablet Take 1 tablet (4 mg total) by mouth every 6 (six) hours as needed for nausea or vomiting 90 tablet 0   • pantoprazole (PROTONIX) 40 mg tablet Take 1 tablet (40 mg total) by mouth daily 30 tablet 3   • pramipexole (MIRAPEX) 0 25 mg tablet Take 1 tablet (0 25 mg total) by mouth daily at bedtime 90 tablet 1   • prazosin (MINIPRESS) 2 mg capsule Take 1 capsule (2 mg total) by mouth daily at bedtime 30 capsule 2   • Syringe/Needle, Disp, (SYRINGE 3CC/64JP8-5/2\") 25G X 1-1/2\" 3 ML MISC Use for B12 IM injections  4 each 0   • Trudhesa 0 725 MG/ACT AERS 1 spray in each nostril for total dose of 1 45mg, may repeat 1 dose after 1 hour  Max 2 doses per 24 hours and 3 doses per week  12 mL 0     Current Facility-Administered Medications   Medication Dose Route Frequency Provider Last Rate Last Admin   • cyanocobalamin injection 1,000 mcg  1,000 mcg Intramuscular Q30 Days Jonny Erie, DO   1,000 mcg at 08/03/20 6948   • cyanocobalamin injection 1,000 mcg  1,000 mcg Intramuscular Q14 Days Jonny Erie, DO   1,000 mcg at 05/18/20 1146   • cyanocobalamin injection 1,000 mcg  1,000 mcg Intramuscular Q30 Days Jonny Sp, DO   1,000 mcg at 09/01/20 1129        Allergies   Allergen Reactions   • Cimzia [Certolizumab Pegol] Swelling   • Desvenlafaxine      Other reaction(s): state of confusion   • Ixekizumab Vomiting   • Valproic Acid Other (See Comments)     Other reaction(s): dilated pupils, \"schizi\"   • Voltaren [Diclofenac] Swelling   • Tizanidine Anxiety       Review of Systems    Video Exam    There were no vitals filed for this visit  Physical Exam     Behavioral Health Psychotherapy Progress Note    Psychotherapy Provided: Individual Psychotherapy     1  Depression, major, recurrent, moderate ([facility] )        2   Generalized anxiety disorder            Goals addressed in session: Goal 1 " DATA: Met with Stephanie for scheduled individual session  She discussed her activities since her last session  She reports that she did a lot of cleaning in her home, and she also cleaned her car  She reports that she scheduled to return to [facility]-- which was helpful in the past  She has her evaluation scheduled for this upcoming Monday to start the aquatherapy  Jaspal Jennings states that she continues to feel a lack of energy and a lack of motivation  She does acknowledge that cleaning around her home gave her some increased motivation and energy to keep going, because she felt a sense of accomplishment  Jaspal Jennings discussed her relationship with her best friend  She continues to report feeling that her friend is not willing to provide her with the emotional support that she needs  We discussed whether or not Jaspal Jennings feels that a conversation with her friend would be helpful  She expressed ambivalence about having a conversation  We will discuss this again at the next session  During this session, this clinician used the following therapeutic modalities: Client-centered Therapy, Dialectical Behavior Therapy, Mindfulness-based Strategies, Motivational Interviewing, Solution-Focused Therapy and Supportive Psychotherapy    Substance Abuse was not addressed during this session  If the client is diagnosed with a co-occurring substance use disorder, please indicate any changes in the frequency or amount of use: n/a  Stage of change for addressing substance use diagnoses: No substance use/Not applicable    ASSESSMENT:  Dana Menjivar presents with a Euthymic/ normal mood  her affect is Normal range and intensity, which is congruent, with her mood and the content of the session  The client has made progress on their goals-- as evidenced by her increased physical activity  Dana Menjivar presents with a minimal risk of suicide, minimal risk of self-harm, and minimal risk of harm to others      For any risk assessment "that surpasses a \"low\" rating, a safety plan must be developed  A safety plan was indicated: no  If yes, describe in detail n/a    PLAN: Between sessions, Deon Webb will continue to focus on increasing her physical activity  She will also continue to focus on setting limits/boundaries with her best friend  At the next session, the therapist will use Client-centered Therapy, Dialectical Behavior Therapy, Mindfulness-based Strategies, Motivational Interviewing, Solution-Focused Therapy and Supportive Psychotherapy to address her mood regulation and relationship concerns  Behavioral Health Treatment Plan and Discharge Planning: Deon Webb is aware of and agrees to continue to work on their treatment plan  They have identified and are working toward their discharge goals   yes    Visit start and stop times:    04/14/23  Start Time: 0806  Stop Time: 4490  Total Visit Time: 51 minutes  "

## 2023-04-19 ENCOUNTER — APPOINTMENT (OUTPATIENT)
Dept: PHYSICAL THERAPY | Age: 33
End: 2023-04-19

## 2023-04-21 ENCOUNTER — TELEPHONE (OUTPATIENT)
Dept: NEUROLOGY | Facility: CLINIC | Age: 33
End: 2023-04-21

## 2023-04-21 NOTE — TELEPHONE ENCOUNTER
Received fax from [facility]  Vilma requires [place]  Id [id number]    PA initiated on CMM    (Key: [id number])    Awaiting determination

## 2023-04-22 ENCOUNTER — OFFICE VISIT (OUTPATIENT)
Dept: URGENT CARE | Facility: CLINIC | Age: 33
End: 2023-04-22

## 2023-04-22 VITALS
BODY MASS INDEX: 45.71 KG/M2 | SYSTOLIC BLOOD PRESSURE: 134 MMHG | DIASTOLIC BLOOD PRESSURE: 64 MMHG | HEART RATE: 94 BPM | WEIGHT: 258 LBS | TEMPERATURE: 98.3 F | HEIGHT: 63 IN | OXYGEN SATURATION: 97 % | RESPIRATION RATE: 18 BRPM

## 2023-04-22 DIAGNOSIS — M26.622 ARTHRALGIA OF LEFT TEMPOROMANDIBULAR JOINT: Primary | ICD-10-CM

## 2023-04-22 RX ORDER — PREDNISONE 10 MG/1
TABLET ORAL
Qty: 15 TABLET | Refills: 0 | Status: SHIPPED | OUTPATIENT
Start: 2023-04-22 | End: 2023-04-27

## 2023-04-22 NOTE — PATIENT INSTRUCTIONS
No current evidence of ear infection  Pain most likely due to TMJ dysfunction  Trial of topical Voltaren gel to area of pain or cut a lidocaine patch and apply to TMJ joint  If no improvement try steroid anti-inflammatory prednisone taper as directed 5, 4, 3, 2, 1  Follow-up with neurology for Botox injections  Recommend ENT evaluation for treatment options

## 2023-04-22 NOTE — PROGRESS NOTES
[facility]        NAME: Jayda Orozco is a 33 y o  female  : 1990    MRN: [id number]  DATE: 2023  TIME: 8:24 AM    Assessment and Plan   Arthralgia of left temporomandibular joint [M26 622]  1  Arthralgia of left temporomandibular joint  predniSONE 10 mg tablet            Patient Instructions   No current evidence of ear infection  Pain most likely due to TMJ dysfunction  Trial of topical Voltaren gel to area of pain or cut a lidocaine patch and apply to TMJ joint  If no improvement try steroid anti-inflammatory prednisone taper as directed 5, 4, 3, 2, 1  Follow-up with neurology for Botox injections  Recommend ENT evaluation for treatment options  Follow up with PCP in 3-5 days  Proceed to  ER if symptoms worsen  Chief Complaint     Chief Complaint   Patient presents with   • Earache     Started a week ago with left ear pain  Got worse today  Has a history of TMJ  Has popping & pressure in her ear  History of Present Illness       Patient presents for evaluation of left-sided facial/periauricular pain which started about a week ago  There is some ear pain as well  No numbness or tingling, no ear drainage  No fevers or chills  No sore throat  Patient has a history of TMJ dysfunction with similar symptoms  She came in to make sure there is no ongoing ear infection  She does feel some popping and clicking with jaw movement and pain radiates down to the left jaw  No skin lesions  No numbness or tingling  No history of prior trigeminal shingles  Review of Systems   Review of Systems   Constitutional: Negative for chills and fever  HENT: Positive for ear pain  Negative for ear discharge, sinus pressure, sinus pain and sore throat  Respiratory: Negative for cough  Neurological: Negative for dizziness and numbness           Current Medications       Current Outpatient Medications:   •  Aimovig 140 MG/ML SOAJ, Inject 140mg (1 pen) under the skin every 30 days  , Disp: 1 mL, Rfl: 11  •  ALPRAZolam (XANAX) 0 5 mg tablet, Take 1 tablet (0 5 mg total) by mouth 2 (two) times a day as needed for anxiety, Disp: 60 tablet, Rfl: 2  •  buPROPion (WELLBUTRIN XL) 300 mg 24 hr tablet, Take 1 tablet (300 mg total) by mouth daily, Disp: 90 tablet, Rfl: 0  •  Cholecalciferol (Vitamin D3) 50 MCG (2000 UT) capsule, Take 2,000 Units by mouth daily, Disp: , Rfl:   •  cyanocobalamin 1,000 mcg/mL, 1 IM injection every week x 4 weeks, then 1 every month, Disp: 4 mL, Rfl: 2  •  escitalopram (LEXAPRO) 20 mg tablet, Take 1 tablet (20 mg total) by mouth daily, Disp: 90 tablet, Rfl: 0  •  etonogestrel-ethinyl estradiol (NuvaRing) 0 12-0 015 MG/24HR vaginal ring, Insert ring for 21 days then remove for one week , Disp: 3 each, Rfl: 4  •  FeroSul 325 (65 Fe) MG tablet, Take 1 tablet by mouth in the morning, Disp: , Rfl:   •  indomethacin (INDOCIN) 25 mg capsule, 1-2 tabs BID prn severe headache with food   Hold toradol , Disp: 30 capsule, Rfl: 0  •  OLANZapine (ZyPREXA) 2 5 mg tablet, Take 1 tablet (2 5 mg total) by mouth daily Do not take with reglan , Disp: 30 tablet, Rfl: 2  •  onabotulinumtoxin A (BOTOX) 100 units, Inject as directed, Disp: , Rfl:   •  ondansetron (ZOFRAN-ODT) 4 mg disintegrating tablet, Take 1 tablet (4 mg total) by mouth every 6 (six) hours as needed for nausea or vomiting, Disp: 90 tablet, Rfl: 0  •  pantoprazole (PROTONIX) 40 mg tablet, Take 1 tablet (40 mg total) by mouth daily, Disp: 30 tablet, Rfl: 3  •  pramipexole (MIRAPEX) 0 25 mg tablet, Take 1 tablet (0 25 mg total) by mouth daily at bedtime, Disp: 90 tablet, Rfl: 1  •  prazosin (MINIPRESS) 2 mg capsule, Take 1 capsule (2 mg total) by mouth daily at bedtime, Disp: 30 capsule, Rfl: 2  •  predniSONE 10 mg tablet, Take 5 tablets (50 mg total) by mouth daily for 1 day, THEN 4 tablets (40 mg total) daily for 1 day, THEN 3 tablets (30 mg total) daily for 1 day, THEN 2 tablets (20 mg total) daily for 1 day, THEN "1 tablet (10 mg total) daily for 1 day , Disp: 15 tablet, Rfl: 0  •  Syringe/Needle, Disp, (SYRINGE 3CC/62CV7-3/2\") 25G X 1-1/2\" 3 ML MISC, Use for B12 IM injections  , Disp: 4 each, Rfl: 0  •  Trudhesa 0 725 MG/ACT AERS, 1 spray in each nostril for total dose of 1 45mg, may repeat 1 dose after 1 hour   Max 2 doses per 24 hours and 3 doses per week , Disp: 12 mL, Rfl: 0  •  dicyclomine (BENTYL) 20 mg tablet, Take 1 tablet (20 mg total) by mouth 2 (two) times a day for 7 days, Disp: 14 tablet, Rfl: 0  •  magnesium Oxide (MAG-OX) 400 mg TABS, Take 1 tablet (400 mg total) by mouth daily, Disp: 30 tablet, Rfl: 0    Current Facility-Administered Medications:   •  cyanocobalamin injection 1,000 mcg, 1,000 mcg, Intramuscular, Q30 Days, Codi Richardson DO, 1,000 mcg at 08/03/20 9435  •  cyanocobalamin injection 1,000 mcg, 1,000 mcg, Intramuscular, Q14 Days, Codi Richardson, DO, 1,000 mcg at 05/18/20 1146  •  cyanocobalamin injection 1,000 mcg, 1,000 mcg, Intramuscular, Q30 Days, Codi Richardson DO, 1,000 mcg at 09/01/20 1129    Current Allergies     Allergies as of 04/22/2023 - Reviewed 04/22/2023   Allergen Reaction Noted   • Cimzia [certolizumab pegol] Swelling 03/22/2022   • Desvenlafaxine  11/26/2012   • Ixekizumab Vomiting 05/27/2022   • Valproic acid Other (See Comments) 10/18/2017   • Voltaren [diclofenac] Swelling 12/13/2022   • Tizanidine Anxiety 01/20/2021            The following portions of the patient's history were reviewed and updated as appropriate: allergies, current medications, past family history, past medical history, past social history, past surgical history and problem list      Past Medical History:   Diagnosis Date   • Abnormal Pap smear of cervix    • Allergic    • Anxiety    • Arthritis    • Depression    • Diabetes mellitus ([address] )    • Fibromyalgia, primary    • Hypertension    • IBS (irritable bowel syndrome)    • Migraine    • Obesity    • Vitamin B12 deficiency        Past Surgical History:   Procedure " "Laterality Date   • COLONOSCOPY N/A 5/8/2018    Procedure: COLONOSCOPY;  Surgeon: Wilbur Salazar MD;  Location: [facility] GI LAB; Service: Gastroenterology   • [place] EXC B9 LESION MRGN XCP SK TG S/N/H/F/G > 4 0CM N/A 7/1/2021    Procedure: EXCISION OF PILAR SCALP CYST X 2 AND RIGHT INNER GROIN NEVVUS;  Surgeon: Allyn Mohan MD;  Location: [address] [place] [address] OR;  Service: Plastics   • [place] REPAIR COMPLEX SCALP/ARM/LEG 2 6-7 5 CM N/A 7/1/2021    Procedure: CLOSURE WOUND SCALP X 2 AND RIGHT INNER GROIN;  Surgeon: Allyn Mohan MD;  Location: [address] [place] [address] OR;  Service: Plastics   • TONSILLECTOMY     • WISDOM TOOTH EXTRACTION         Family History   Problem Relation Age of Onset   • Rheum arthritis Mother    • Psoriasis Mother    • Other Mother    • Hypertension Mother    • Diabetes unspecified Mother    • Sjogren's syndrome Mother    • Alcohol abuse Mother    • Drug abuse Mother    • Anxiety disorder Father    • Alcohol abuse Father    • Lung cancer Maternal Grandfather    • Cancer Other         bone   • Diabetes Other    • Other Other         High blood pressure   • Depression Maternal Grandmother          Medications have been verified  Objective   /64   Pulse 94   Temp 98 3 °F (36 8 °C)   Resp 18   Ht 5' 3\" (1 6 m)   Wt 117 kg (258 lb)   SpO2 97%   BMI 45 70 kg/m²   No LMP recorded  (Menstrual status: Birth Control)  Physical Exam     Physical Exam  Constitutional:       Appearance: She is not ill-appearing  HENT:      Head:      Comments: Tenderness along the left TMJ joint  No deformity  No parotid gland swelling  No erythema  No mastoid tenderness  Minimal pain with auricular movement  Right Ear: Tympanic membrane and ear canal normal       Left Ear: Tympanic membrane and ear canal normal       Nose: Nose normal  No congestion  Mouth/Throat:      Mouth: Mucous membranes are moist       Pharynx: No oropharyngeal exudate or posterior oropharyngeal erythema     Pulmonary:      Effort: " Pulmonary effort is normal    Musculoskeletal:      Cervical back: Neck supple  No rigidity or tenderness  Lymphadenopathy:      Cervical: No cervical adenopathy  Neurological:      Mental Status: She is alert

## 2023-04-24 ENCOUNTER — OFFICE VISIT (OUTPATIENT)
Dept: PHYSICAL THERAPY | Age: 33
End: 2023-04-24

## 2023-04-24 DIAGNOSIS — M25.50 ARTHRALGIA OF MULTIPLE SITES: ICD-10-CM

## 2023-04-24 DIAGNOSIS — M94.0 COSTOCHONDRITIS: ICD-10-CM

## 2023-04-24 DIAGNOSIS — M54.16 LUMBAR RADICULOPATHY: ICD-10-CM

## 2023-04-24 DIAGNOSIS — M79.7 FIBROMYALGIA: Primary | ICD-10-CM

## 2023-04-24 DIAGNOSIS — M46.90 INFLAMMATORY SPONDYLOPATHY, UNSPECIFIED SPINAL REGION (HCC): ICD-10-CM

## 2023-04-24 NOTE — PROGRESS NOTES
Daily Note     Today's date: 2023  Patient name: Elzbieta Walsh  : 1990  MRN: [id number]  Referring provider: Liz Valente PA-C  Dx:   Encounter Diagnosis     ICD-10-CM    1  Fibromyalgia  M79 7       2  Inflammatory spondylopathy, unspecified spinal region ([facility] [address] )  M46 90       3  Arthralgia of multiple sites  M25 50       4  Lumbar radiculopathy  M54 16       5  Costochondritis  M94 0                      Subjective: Patient is going on an AVSOL IV infusion, which she was approved today and she is waiting for the infusion center to call and schedule  Patient she had fatigue and muscle soreness after last pool based PT, and today she denies left heel pain today  Patient reported seated postural correction is pain aggravating, thus she was instructed to perform slough over correct in a reduced arc motion  Objective: See treatment diary below  Assessment: Patient presents with fatigue as her primary symptom aggravating factor during and after PT treatment, except with postural over correct which she was recommended to perform postural correction in an arc of motion that is not pain aggravating  But, lumbar spine extension is helpful in lumbar spine pain reduction  Thus, PT is warranted to facilitate lumbar spine and bilateral lower extremity mobility and strength to promote full functional progress  Plan: Cont with plan of care      Precautions:     Access Code: [email or web address]  URL: [email or web address]  Date: 2023  Prepared by: Delmer Machuca    Exercises  - Correct Seated Posture  - 3 x daily - 7 x weekly - 2 sets - 10 reps  - Standing Lumbar Extension with Counter  - 3 x daily - 7 x weekly - 2 sets - 10 reps      Manuals                                                               Neuro Re-Ed                                       HEP: postural correction seated 2 x             HEP lumbar extension against counter 2 x Ther Ex                          Water walking pre and post  4 laps  4 laps          Seated hamstring stretch:B:  20SEC 5X  20 sec x 5          LAQ:B:  20x  2 x 10          Hip flexion:B:  20x  2 x 10          Seated postural correction slough over correct  20x  2 x 10          Cervical spine retraction  20x 2sec  2 x 10          UT stretch:B:  20sec 5x  20 sec x 5          LS stretch:B:  20sec 5x  20 sec x 5                       Standing scapular squeezes  20x 3sec  3 sec x 20          Standing abdominal contraction with ball push down  NT NT          Standing slr x 3:B:  20x  2 x 10          Standing hr and tr:B:  20X  2 x 10                       SLS B  10X 10SEC  10 sec x 10          Mini squats  20X 2 x 10            tandem stance:B:  NT NT          Side stepping   6x  6 x             tandem ambulation   NT NT          Forward step ups:B:  NT NT          Standing lumbar spine extension  30X  3 x 10          Paddles rows, horizontal add / abd and shoulder add / abd  NT NT          Pool pony bicycle  5 MIN  5 min                       Pool pony hang  NT                                                                            Ther Activity                                       Gait Training                                       Modalities

## 2023-04-26 NOTE — TELEPHONE ENCOUNTER
PA has been approved through 4/21/24    Approval letter faxed to [facility] [phone]

## 2023-04-28 ENCOUNTER — TELEMEDICINE (OUTPATIENT)
Dept: BEHAVIORAL/MENTAL HEALTH CLINIC | Facility: CLINIC | Age: 33
End: 2023-04-28

## 2023-04-28 DIAGNOSIS — F41.1 GENERALIZED ANXIETY DISORDER: Chronic | ICD-10-CM

## 2023-04-28 DIAGNOSIS — F33.1 DEPRESSION, MAJOR, RECURRENT, MODERATE (HCC): Primary | Chronic | ICD-10-CM

## 2023-04-28 NOTE — PSYCH
Virtual Regular Visit    Verification of patient location:    Patient is located at Home in the following state in which I hold an active license [place]    Assessment/Plan:    Problem List Items Addressed This Visit        Other    Generalized anxiety disorder (Chronic)    Depression, major, recurrent, moderate ([address] ) - Primary (Chronic)     Goals addressed in session: Goal 1      Reason for visit is   Chief Complaint   Patient presents with   • Virtual Regular Visit      Encounter provider APARNA Centeno    Provider located at [address] [place] [id number] [phone]    Recent Visits  No visits were found meeting these conditions  Showing recent visits within past 7 days and meeting all other requirements  Today's Visits  Date Type Provider Dept   04/28/23 [address], [address] today's visits and meeting all other requirements  Future Appointments  No visits were found meeting these conditions  Showing future appointments within next 150 days and meeting all other requirements     The patient was identified by name and date of birth  Beryle Dawn was informed that this is a telemedicine visit and [facility]  She agrees to proceed     My office door was closed  No one else was in the room  She acknowledged consent and understanding of privacy and security of the video platform  The patient has agreed to participate and understands they can discontinue the visit at any time  Patient is aware this is a billable service  Dario Albarran is a 33 y o  female      HPI     Past Medical History:   Diagnosis Date   • Abnormal Pap smear of cervix    • Allergic    • Anxiety    • Arthritis    • Depression    • Diabetes mellitus ([facility] [address] )    • Fibromyalgia, primary    • Hypertension • IBS (irritable bowel syndrome)    • Migraine    • Obesity    • Vitamin B12 deficiency        Past Surgical History:   Procedure Laterality Date   • COLONOSCOPY N/A 5/8/2018    Procedure: COLONOSCOPY;  Surgeon: Wendi Sofia MD;  Location: [facility]; Service: Gastroenterology   • OH EXC B9 LESION MRGN XCP SK TG S/N/H/F/G > 4 0CM N/A 7/1/2021    Procedure: EXCISION OF PILAR SCALP CYST X 2 AND RIGHT INNER GROIN NEVVUS;  Surgeon: Carrie Hanson MD;  Location: [address] [place] [address];  Service: Plastics   • OH REPAIR COMPLEX SCALP/ARM/LEG 2 6-7 5 CM N/A 7/1/2021    Procedure: CLOSURE WOUND SCALP X 2 AND RIGHT INNER GROIN;  Surgeon: Carrie Hanson MD;  Location: [address] [place] [address];  Service: Plastics   • TONSILLECTOMY     • WISDOM TOOTH EXTRACTION         Current Outpatient Medications   Medication Sig Dispense Refill   • Aimovig 140 MG/ML SOAJ Inject 140mg (1 pen) under the skin every 30 days  1 mL 11   • ALPRAZolam (XANAX) 0 5 mg tablet Take 1 tablet (0 5 mg total) by mouth 2 (two) times a day as needed for anxiety 60 tablet 2   • buPROPion (WELLBUTRIN XL) 300 mg 24 hr tablet Take 1 tablet (300 mg total) by mouth daily 90 tablet 0   • Cholecalciferol (Vitamin D3) 50 MCG (2000 UT) capsule Take 2,000 Units by mouth daily     • cyanocobalamin 1,000 mcg/mL 1 IM injection every week x 4 weeks, then 1 every month 4 mL 2   • dicyclomine (BENTYL) 20 mg tablet Take 1 tablet (20 mg total) by mouth 2 (two) times a day for 7 days 14 tablet 0   • escitalopram (LEXAPRO) 20 mg tablet Take 1 tablet (20 mg total) by mouth daily 90 tablet 0   • etonogestrel-ethinyl estradiol (NuvaRing) 0 12-0 015 MG/24HR vaginal ring Insert ring for 21 days then remove for one week  3 each 4   • FeroSul 325 (65 Fe) MG tablet Take 1 tablet by mouth in the morning     • indomethacin (INDOCIN) 25 mg capsule 1-2 tabs BID prn severe headache with food  Hold toradol   30 capsule 0   • magnesium Oxide (MAG-OX) 400 mg TABS Take 1 tablet (400 mg total) by mouth daily 30 "tablet 0   • OLANZapine (ZyPREXA) 2 5 mg tablet Take 1 tablet (2 5 mg total) by mouth daily Do not take with reglan  30 tablet 2   • onabotulinumtoxin A (BOTOX) 100 units Inject as directed     • ondansetron (ZOFRAN-ODT) 4 mg disintegrating tablet Take 1 tablet (4 mg total) by mouth every 6 (six) hours as needed for nausea or vomiting 90 tablet 0   • pantoprazole (PROTONIX) 40 mg tablet Take 1 tablet (40 mg total) by mouth daily 30 tablet 3   • pramipexole (MIRAPEX) 0 25 mg tablet Take 1 tablet (0 25 mg total) by mouth daily at bedtime 90 tablet 1   • prazosin (MINIPRESS) 2 mg capsule Take 1 capsule (2 mg total) by mouth daily at bedtime 30 capsule 2   • Syringe/Needle, Disp, (SYRINGE 3CC/52ZA0-1/2\") 25G X 1-1/2\" 3 ML MISC Use for B12 IM injections  4 each 0   • Trudhesa 0 725 MG/ACT AERS 1 spray in each nostril for total dose of 1 45mg, may repeat 1 dose after 1 hour  Max 2 doses per 24 hours and 3 doses per week  12 mL 0     Current Facility-Administered Medications   Medication Dose Route Frequency Provider Last Rate Last Admin   • cyanocobalamin injection 1,000 mcg  1,000 mcg Intramuscular Q30 Days Loletta Toni, DO   1,000 mcg at 08/03/20 3033   • cyanocobalamin injection 1,000 mcg  1,000 mcg Intramuscular Q14 Days Loletta Toni, DO   1,000 mcg at 05/18/20 1146   • cyanocobalamin injection 1,000 mcg  1,000 mcg Intramuscular Q30 Days Loletta Toni, DO   1,000 mcg at 09/01/20 1129        Allergies   Allergen Reactions   • Cimzia [Certolizumab Pegol] Swelling   • Desvenlafaxine      Other reaction(s): state of confusion   • Ixekizumab Vomiting   • Valproic Acid Other (See Comments)     Other reaction(s): dilated pupils, \"schizi\"   • Voltaren [Diclofenac] Swelling   • Tizanidine Anxiety       Review of Systems    Video Exam    There were no vitals filed for this visit  Physical Exam     Behavioral Health Psychotherapy Progress Note    Psychotherapy Provided: Individual Psychotherapy     1   Depression, major, " "recurrent, moderate ([facility] [address] )        2  Generalized anxiety disorder            Goals addressed in session: Goal 1     DATA: Met with Stephanie for scheduled individual session  \"I was so excited to tell you  I have been working a lot on self-care  \" Brit Obando states that she has been learning about her hair and how to treat and style her hair  She states, \"I feel human  \" She states that she feels like she is \"awake\", which is a feeling she has not had in a very long time  She has reached out to a friend she has not seen in a while, who knows a lot about hair care  She is also teaching her best friend about the process  Camillaerick Obando states that she has been hooping and has lost 4 pounds since our last session  She has also started aquatherapy  Stephanie discussed some pain issues that are impacting her  We discussed some potential causes for her pain, and this clinician strongly encouraged her to speak with her neurologist  We discussed the importance of physical activity and being outside, as ways to improve her physical and mental health  We discussed creating a loose schedule, so she can fit different activities into her day  Camilladarriuscharis Obando discussed her relationship with her mother and her grandmother  She reports, \"I'm really trying to be there for them when I can  \" She discussed her relationship with her best friend  Her friend told her  that she has been having a relationship with someone else  Camilladarriuscharis Obando has been offering support to her friend, and she is encouraging her friend to separate from her   Camillaerick Obando is able to acknowledge that she has been in difficult situations with her own relationships, and she is using her own experiences to help her friend set some limits       During this session, this clinician used the following therapeutic modalities: Client-centered Therapy, Dialectical Behavior Therapy, Mindfulness-based Strategies, Motivational Interviewing, Solution-Focused Therapy and Supportive " "Psychotherapy    Substance Abuse was not addressed during this session  If the client is diagnosed with a co-occurring substance use disorder, please indicate any changes in the frequency or amount of use: n/a  Stage of change for addressing substance use diagnoses: No substance use/Not applicable    ASSESSMENT:  Valentina Delvalle presents with a Euthymic/ normal mood  her affect is Normal range and intensity, which is congruent, with her mood and the content of the session  The client has made progress on their goals  Valentina Delvalle presents with a minimal risk of suicide, minimal risk of self-harm, and minimal risk of harm to others  For any risk assessment that surpasses a \"low\" rating, a safety plan must be developed  A safety plan was indicated: no  If yes, describe in detail n/a    PLAN: Between sessions, Valentina Delvalle will continue to focus on her self care, increase her physical activity, and improve her social communication  At the next session, the therapist will use Client-centered Therapy, Dialectical Behavior Therapy, Mindfulness-based Strategies, Motivational Interviewing, Solution-Focused Therapy and Supportive Psychotherapy to address her mood regulation, physical health issues, and relationship concerns  Behavioral Health Treatment Plan and Discharge Planning: Valentina Delvalle is aware of and agrees to continue to work on their treatment plan  They have identified and are working toward their discharge goals   yes    Visit start and stop times:    04/28/23  Start Time: 0804  Stop Time: 1946  Total Visit Time: 49 minutes      "

## 2023-04-29 ENCOUNTER — APPOINTMENT (EMERGENCY)
Dept: CT IMAGING | Facility: HOSPITAL | Age: 33
End: 2023-04-29

## 2023-04-29 ENCOUNTER — HOSPITAL ENCOUNTER (EMERGENCY)
Facility: HOSPITAL | Age: 33
Discharge: HOME | End: 2023-04-29
Attending: EMERGENCY MEDICINE

## 2023-04-29 VITALS
HEART RATE: 92 BPM | WEIGHT: 255 LBS | SYSTOLIC BLOOD PRESSURE: 165 MMHG | TEMPERATURE: 98.5 F | DIASTOLIC BLOOD PRESSURE: 88 MMHG | RESPIRATION RATE: 18 BRPM | BODY MASS INDEX: 45.18 KG/M2 | OXYGEN SATURATION: 96 % | HEIGHT: 63 IN

## 2023-04-29 DIAGNOSIS — H92.02 LEFT EAR PAIN: Primary | ICD-10-CM

## 2023-04-29 RX ORDER — METHYLPREDNISOLONE 4 MG/1
TABLET ORAL
Qty: 21 TABLET | Refills: 0 | Status: SHIPPED | OUTPATIENT
Start: 2023-04-29

## 2023-04-29 RX ORDER — PREDNISONE 20 MG/1
40 TABLET ORAL ONCE
Status: COMPLETED | OUTPATIENT
Start: 2023-04-29 | End: 2023-04-29

## 2023-04-29 RX ORDER — AMOXICILLIN 500 MG/1
500 CAPSULE ORAL EVERY 8 HOURS SCHEDULED
Qty: 21 CAPSULE | Refills: 0 | Status: SHIPPED | OUTPATIENT
Start: 2023-04-29 | End: 2023-05-06

## 2023-04-29 RX ORDER — AMOXICILLIN 500 MG/1
500 CAPSULE ORAL ONCE
Status: COMPLETED | OUTPATIENT
Start: 2023-04-29 | End: 2023-04-29

## 2023-04-29 RX ADMIN — AMOXICILLIN 500 MG: 500 CAPSULE ORAL at 19:31

## 2023-04-29 RX ADMIN — PREDNISONE 40 MG: 20 TABLET ORAL at 19:31

## 2023-04-29 NOTE — DISCHARGE INSTRUCTIONS
Please consider following up with an ENT for further evaluation of your ear to verify that there is not a separate problem other than your left TMJ going on  Use amoxicillin 1 pill 3 times a day for a week as well as Medrol Dosepak, use it as directed  You can start it when you get it  Take with food  Return to the ER with any new, concerning, or worsening issues

## 2023-04-29 NOTE — ED PROVIDER NOTES
"History  Chief Complaint   Patient presents with    Earache     Pt reports L ear pain described as \"shooting and pressure\"; hx TMJ; also states she is unable to burp     36-year-old female presents to the emergency department complaining of jaw pain on the left side which has been an ongoing issue for her for over a year  The patient notes that she went to her family doctor and she was diagnosed with TMJ  The patient is so far gotten Botox shots into the area as well as Voltaren gel and she notes that some days the pain is worse than others  The patient denies any trauma  Today she said the pain was so severe that she was getting significant earache  The patient was concerned she had an ear infection and was actually seen by urgent care earlier in the week  The patient denies any fever chills or discharge  The patient denies dental pain  Prior to Admission Medications   Prescriptions Last Dose Informant Patient Reported? Taking? ALPRAZolam (XANAX) 0 5 mg tablet   No No   Sig: Take 1 tablet (0 5 mg total) by mouth 2 (two) times a day as needed for anxiety   Aimovig 140 MG/ML SOAJ   No No   Sig: Inject 140mg (1 pen) under the skin every 30 days  Cholecalciferol (Vitamin D3) 50 MCG (2000 UT) capsule   Yes No   Sig: Take 2,000 Units by mouth daily   FeroSul 325 (65 Fe) MG tablet   Yes No   Sig: Take 1 tablet by mouth in the morning   OLANZapine (ZyPREXA) 2 5 mg tablet   No No   Sig: Take 1 tablet (2 5 mg total) by mouth daily Do not take with reglan  Syringe/Needle, Disp, (SYRINGE 3CC/99JA1-6/2\") 25G X 1-1/2\" 3 ML MISC   No No   Sig: Use for B12 IM injections  Trudhesa 0 725 MG/ACT AERS   No No   Si spray in each nostril for total dose of 1 45mg, may repeat 1 dose after 1 hour  Max 2 doses per 24 hours and 3 doses per week     buPROPion (WELLBUTRIN XL) 300 mg 24 hr tablet   No No   Sig: Take 1 tablet (300 mg total) by mouth daily   cyanocobalamin 1,000 mcg/mL   No No   Si IM injection " every week x 4 weeks, then 1 every month   dicyclomine (BENTYL) 20 mg tablet   No No   Sig: Take 1 tablet (20 mg total) by mouth 2 (two) times a day for 7 days   escitalopram (LEXAPRO) 20 mg tablet   No No   Sig: Take 1 tablet (20 mg total) by mouth daily   etonogestrel-ethinyl estradiol (NuvaRing) 0 12-0 015 MG/24HR vaginal ring   No No   Sig: Insert ring for 21 days then remove for one week  indomethacin (INDOCIN) 25 mg capsule   No No   Si-2 tabs BID prn severe headache with food  Hold toradol    magnesium Oxide (MAG-OX) 400 mg TABS   No No   Sig: Take 1 tablet (400 mg total) by mouth daily   onabotulinumtoxin A (BOTOX) 100 units   Yes No   Sig: Inject as directed   ondansetron (ZOFRAN-ODT) 4 mg disintegrating tablet   No No   Sig: Take 1 tablet (4 mg total) by mouth every 6 (six) hours as needed for nausea or vomiting   pantoprazole (PROTONIX) 40 mg tablet   No No   Sig: Take 1 tablet (40 mg total) by mouth daily   pramipexole (MIRAPEX) 0 25 mg tablet   No No   Sig: Take 1 tablet (0 25 mg total) by mouth daily at bedtime   prazosin (MINIPRESS) 2 mg capsule   No No   Sig: Take 1 capsule (2 mg total) by mouth daily at bedtime      Facility-Administered Medications Last Administration Doses Remaining   cyanocobalamin injection 1,000 mcg 8/3/2020  8:59 AM    cyanocobalamin injection 1,000 mcg 2020 11:46 AM    cyanocobalamin injection 1,000 mcg 2020 11:29 AM           Past Medical History:   Diagnosis Date    Abnormal Pap smear of cervix     Allergic     Anxiety     Arthritis     Depression     Diabetes mellitus (HCC)     Fibromyalgia, primary     Hypertension     IBS (irritable bowel syndrome)     Migraine     Obesity     Vitamin B12 deficiency        Past Surgical History:   Procedure Laterality Date    COLONOSCOPY N/A 2018    Procedure: COLONOSCOPY;  Surgeon: Rusty Carreno MD;  Location: [facility];   Service: Gastroenterology    W[place] EXC B9 LESION MRGN XCP SK TG S/N/H/F/G > 4 0CM N/A 7/1/2021    Procedure: EXCISION OF PILAR SCALP CYST X 2 AND RIGHT INNER GROIN NEVVUS;  Surgeon: Grace Mccurdy MD;  Location:  MAIN OR;  Service: Plastics    NE REPAIR COMPLEX SCALP/ARM/LEG 2 6-7 5 CM N/A 7/1/2021    Procedure: CLOSURE WOUND SCALP X 2 AND RIGHT INNER GROIN;  Surgeon: Grace Mccurdy MD;  Location:  MAIN OR;  Service: Plastics    TONSILLECTOMY      WISDOM TOOTH EXTRACTION         Family History   Problem Relation Age of Onset    Rheum arthritis Mother     Psoriasis Mother     Other Mother     Hypertension Mother     Diabetes unspecified Mother     Sjogren's syndrome Mother     Alcohol abuse Mother     Drug abuse Mother     Anxiety disorder Father     Alcohol abuse Father     Lung cancer Maternal Grandfather     Cancer Other         bone    Diabetes Other     Other Other         High blood pressure    Depression Maternal Grandmother      I have reviewed and agree with the history as documented  E-Cigarette/Vaping    E-Cigarette Use Current Some Day User     Start Date 7/1/19     Comments medical marijuana      E-Cigarette/Vaping Substances    Nicotine No     THC Yes     CBD Yes     Flavoring No     Other No     Unknown No      Social History     Tobacco Use    Smoking status: Never    Smokeless tobacco: Never   Vaping Use    Vaping Use: Some days    Start date: 7/1/2019    Substances: THC, CBD   Substance Use Topics    Alcohol use: Yes     Comment: rarely    Drug use: Yes     Frequency: 2 0 times per week     Types: Marijuana     Comment: medical marijuana (vape)       Review of Systems    Physical Exam  Physical Exam  Vitals and nursing note reviewed  Constitutional:       General: She is not in acute distress  Appearance: Normal appearance  She is well-developed  HENT:      Head: Normocephalic and atraumatic        Right Ear: Tympanic membrane, ear canal and external ear normal       Left Ear: External ear normal       Ears:      Comments: Patient with slight injection over the superior aspect of the TM as well as the roof of the external auditory canal      Nose: Nose normal       Mouth/Throat:      Mouth: Mucous membranes are moist       Pharynx: No oropharyngeal exudate or posterior oropharyngeal erythema  Eyes:      Conjunctiva/sclera: Conjunctivae normal    Cardiovascular:      Rate and Rhythm: Normal rate and regular rhythm  Pulses: Normal pulses  Heart sounds: Normal heart sounds  No murmur heard  Pulmonary:      Effort: Pulmonary effort is normal  No respiratory distress  Breath sounds: Normal breath sounds  Abdominal:      General: Bowel sounds are normal       Palpations: Abdomen is soft  Tenderness: There is no abdominal tenderness  Musculoskeletal:         General: Tenderness present  No swelling or deformity  Cervical back: Neck supple  Comments: Tenderness over the left TMJ joint  Skin:     General: Skin is warm and dry  Capillary Refill: Capillary refill takes less than 2 seconds  Neurological:      General: No focal deficit present  Mental Status: She is alert and oriented to person, place, and time  Mental status is at baseline           Vital Signs  ED Triage Vitals [04/29/23 1650]   Temperature Pulse Respirations Blood Pressure SpO2   98 5 °F (36 9 °C) 92 18 165/88 96 %      Temp Source Heart Rate Source Patient Position - Orthostatic VS BP Location FiO2 (%)   Temporal Monitor Sitting Left arm --      Pain Score       8           Vitals:    04/29/23 1650   BP: 165/88   Pulse: 92   Patient Position - Orthostatic VS: Sitting         Visual Acuity      ED Medications  Medications   amoxicillin (AMOXIL) capsule 500 mg (500 mg Oral Given 4/29/23 1931)   predniSONE tablet 40 mg (40 mg Oral Given 4/29/23 1931)       Diagnostic Studies  Results Reviewed     None                 CT facial bones without contrast   Final Result by Mayra Bell MD (04/29 1817)      Normal noncontrast CT of the facial bones  Workstation performed: [id number]                    Procedures  Procedures         ED Course                               SBIRT 22yo+    Flowsheet Row Most Recent Value   Initial Alcohol Screen: US AUDIT-C     1  How often do you have a drink containing alcohol? 1 Filed at: 04/29/2023 1653   2  How many drinks containing alcohol do you have on a typical day you are drinking? 1 Filed at: 04/29/2023 1653   3a  Male UNDER 65: How often do you have five or more drinks on one occasion? 0 Filed at: 04/29/2023 1653   3b  FEMALE Any Age, or MALE 65+: How often do you have 4 or more drinks on one occassion? 0 Filed at: 04/29/2023 1653   Audit-C Score 2 Filed at: 04/29/2023 1653   BRUCE: How many times in the past year have you    Used an illegal drug or used a prescription medication for non-medical reasons? Never Filed at: 04/29/2023 1653                    Medical Decision Making  25-year-old female presents to the emergency room with left ear pain  The patient has a history of TMJ but noted that today the pain was shooting into her ear  Patient was at an urgent care a few days ago and was told she does not have an infection she is currently taking Voltaren gel as well as Botox injections for TMJ  The patient has not seen any ENT for this  Patient denies any trauma or fever or difficulty in opening or closing her mouth at this time  Examination of the ear showed erythema along the superior aspect of the tympanic membrane as well as the roof of the external auditory canal   Patient had a CT scan done of the mandible due to chronic pain without further evaluation with imaging, and this was found to be nonacute  Patient be placed on steroids as well as antibiotics to treat inflammation and/or possible early ear infection    Patient discharged home with recommendations to follow-up with her doctors as outpatient    Left ear pain: chronic illness or injury     Details: Likely due to TMJ but also possibly due to infection at the superior aspect of the tympanic membrane on the left  Amount and/or Complexity of Data Reviewed  Radiology: ordered  Risk  Prescription drug management  Disposition  Final diagnoses:   Left ear pain     Time reflects when diagnosis was documented in both MDM as applicable and the Disposition within this note     Time User Action Codes Description Comment    4/29/2023  7:27 PM Algernon Moritz Add [H92 02] Left ear pain       ED Disposition     ED Disposition   Discharge    Condition   Stable    Date/Time   Sat Apr 29, 2023  7:23 PM    Comment   Kenia Mathur discharge to home/self care  Follow-up Information     Follow up With Specialties Details Why Contact Info    Mare Richardson DO Family Medicine On 5/2/2023  [address]  [phone]            Discharge Medication List as of 4/29/2023  7:28 PM      START taking these medications    Details   amoxicillin (AMOXIL) 500 mg capsule Take 1 capsule (500 mg total) by mouth every 8 (eight) hours for 7 days, Starting Sat 4/29/2023, Until Sat 5/6/2023, Normal      methylPREDNISolone 4 MG tablet therapy pack Use as directed on package, Normal         CONTINUE these medications which have NOT CHANGED    Details   Aimovig 140 MG/ML SOAJ Inject 140mg (1 pen) under the skin every 30 days  , Normal      ALPRAZolam (XANAX) 0 5 mg tablet Take 1 tablet (0 5 mg total) by mouth 2 (two) times a day as needed for anxiety, Starting Fri 4/7/2023, Normal      buPROPion (WELLBUTRIN XL) 300 mg 24 hr tablet Take 1 tablet (300 mg total) by mouth daily, Starting Thu 4/6/2023, Normal      Cholecalciferol (Vitamin D3) 50 MCG (2000 UT) capsule Take 2,000 Units by mouth daily, Starting Thu 1/26/2023, Historical Med      cyanocobalamin 1,000 mcg/mL 1 IM injection every week x 4 weeks, then 1 every month, Normal      dicyclomine (BENTYL) 20 mg tablet Take 1 tablet (20 mg total) by mouth 2 "(two) times a day for 7 days, Starting Sun 2/19/2023, Until Sun 2/26/2023, Normal      escitalopram (LEXAPRO) 20 mg tablet Take 1 tablet (20 mg total) by mouth daily, Starting Fri 4/7/2023, Normal      etonogestrel-ethinyl estradiol (NuvaRing) 0 12-0 015 MG/24HR vaginal ring Insert ring for 21 days then remove for one week , Normal      FeroSul 325 (65 Fe) MG tablet Take 1 tablet by mouth in the morning, Starting Thu 1/26/2023, Historical Med      indomethacin (INDOCIN) 25 mg capsule 1-2 tabs BID prn severe headache with food  Hold toradol , Normal      magnesium Oxide (MAG-OX) 400 mg TABS Take 1 tablet (400 mg total) by mouth daily, Starting Fri 10/28/2022, Until Thu 3/30/2023, Normal      OLANZapine (ZyPREXA) 2 5 mg tablet Take 1 tablet (2 5 mg total) by mouth daily Do not take with reglan , Starting Fri 4/7/2023, Normal      onabotulinumtoxin A (BOTOX) 100 units Inject as directed, Starting Tue 8/29/2017, Historical Med      ondansetron (ZOFRAN-ODT) 4 mg disintegrating tablet Take 1 tablet (4 mg total) by mouth every 6 (six) hours as needed for nausea or vomiting, Starting Thu 3/30/2023, Normal      pantoprazole (PROTONIX) 40 mg tablet Take 1 tablet (40 mg total) by mouth daily, Starting Fri 5/27/2022, Normal      pramipexole (MIRAPEX) 0 25 mg tablet Take 1 tablet (0 25 mg total) by mouth daily at bedtime, Starting Fri 11/11/2022, Normal      prazosin (MINIPRESS) 2 mg capsule Take 1 capsule (2 mg total) by mouth daily at bedtime, Starting Mon 2/13/2023, Normal      Syringe/Needle, Disp, (SYRINGE 3CC/98RW5-4/2\") 25G X 1-1/2\" 3 ML MISC Use for B12 IM injections  , Normal      Trudhesa 0 725 MG/ACT AERS 1 spray in each nostril for total dose of 1 45mg, may repeat 1 dose after 1 hour  Max 2 doses per 24 hours and 3 doses per week , Normal             No discharge procedures on file      PDMP Review       Value Time User    PDMP Reviewed  Yes 4/7/2023 10:12 AM Dada Jang MD          ED " Provider  Electronically Signed by           Colleen Power DO  04/30/23 7623

## 2023-05-01 ENCOUNTER — OFFICE VISIT (OUTPATIENT)
Dept: PHYSICAL THERAPY | Age: 33
End: 2023-05-01

## 2023-05-01 DIAGNOSIS — M94.0 COSTOCHONDRITIS: Primary | ICD-10-CM

## 2023-05-01 NOTE — PROGRESS NOTES
"Daily Note     Today's date: 2023  Patient name: Ugo Casiano  : 1990  MRN: [id number]  Referring provider: Vernon Walter PA-C  Dx:   Encounter Diagnosis     ICD-10-CM    1  Costochondritis  M94 0                      Subjective: Patient \"I am having a flare up- I am in pain\"     Objective: See treatment diary below  Assessment: fair tolerance to exercises with seated breaks  Progress as able  Pt continues to benefit from skilled aqua therapy to address pain and strength  Plan: Cont with plan of care      Precautions:     Access Code: [email or web address]  URL: [email or web address]  Date: 2023  Prepared by: Meño Machuca    Exercises  - Correct Seated Posture  - 3 x daily - 7 x weekly - 2 sets - 10 reps  - Standing Lumbar Extension with Counter  - 3 x daily - 7 x weekly - 2 sets - 10 reps      Manuals                                    Neuro Re-Ed                                       HEP: postural correction seated 2 x             HEP lumbar extension against counter 2 x                                                    Ther Ex                          Water walking pre and post  4 laps  4 laps 4x PRE   Only          Seated hamstring stretch:B:  20SEC 5X  20 sec x 5 20sec 3x          LAQ:B:  20x  2 x 10 20x          Hip flexion:B:  20x  2 x 10 20x          Seated postural correction slough over correct  20x  2 x 10 20x          Cervical spine retraction  20x 2sec  2 x 10 20x 2sec          UT stretch:B:  20sec 5x  20 sec x 5 20sec 5x          LS stretch:B:  20sec 5x  20 sec x 5 20sec 5x                       Standing scapular squeezes  20x 3sec  3 sec x 20 20x 3sec          Standing abdominal contraction with ball push down  NT NT 15x          Standing slr x 3:B:  20x  2 x 10 20x          Standing hr and tr:B:  20X  2 x 10 20x                       SLS B  10X 10SEC  10 sec x 10 10x 10sec          Mini squats  20X 2 x 10 20x            tandem stance:B:  NT NT " NT         Side stepping   6x  6 x  6X           tandem ambulation   NT NT 6X         Forward step ups:B:  NT NT NT         Standing lumbar spine extension  30X  3 x 10 20X          Paddles rows, horizontal add / abd and shoulder add / abd  NT NT NT         Pool pony bicycle  5 MIN  5 min 3 MIN                       Pool pony hang  NT  NT                                                                          Ther Activity                                       Gait Training                                       Modalities

## 2023-05-02 ENCOUNTER — PROCEDURE VISIT (OUTPATIENT)
Dept: NEUROLOGY | Facility: CLINIC | Age: 33
End: 2023-05-02

## 2023-05-02 VITALS — TEMPERATURE: 97.2 F | HEART RATE: 102 BPM | SYSTOLIC BLOOD PRESSURE: 145 MMHG | DIASTOLIC BLOOD PRESSURE: 80 MMHG

## 2023-05-02 DIAGNOSIS — G43.709 CHRONIC MIGRAINE WITHOUT AURA WITHOUT STATUS MIGRAINOSUS, NOT INTRACTABLE: Primary | ICD-10-CM

## 2023-05-02 NOTE — PROGRESS NOTES
Universal Protocol   Consent: Verbal consent obtained  Written consent obtained    Risks and benefits: risks, benefits and alternatives were discussed  Consent given by: patient  Patient understanding: patient states understanding of the procedure being performed  Patient consent: the patient's understanding of the procedure matches consent given  Procedure consent: procedure consent matches procedure scheduled        Chemodenervation     Date/Time 5/2/2023 8:00 AM     Performed by  Gloria Montavlo PA-C     Authorized by Gloria Montalvo PA-C        Pre-procedure details      Prepped With: Alcohol     Procedure details     Position:  Upright   Botox     Botox Type:  Type A    Brand:  Botox    mL's of Botulinum Toxin:  200    Final Concentration per CC:  100 units    Needle Gauge:  30 G 2 5 inch   Procedures     Botox Procedures: chronic headache      Indications: migraines     Injection Location      Head / Face:  L superior cervical paraspinal, R superior cervical paraspinal, L , R , procerus, L temporalis, R temporalis, R frontalis, L frontalis, R medial occipitalis and L medial occipitalis    L  injection amount:  5 unit(s)    R  injection amount:  5 unit(s)    L lateral frontalis:  5 unit(s)    R lateral frontalis:  5 unit(s)    L medial frontalis:  5 unit(s)    R medial frontalis:  5 unit(s)    L temporalis injection amount:  20 unit(s)    R temporalis injection amount:  20 unit(s)    Procerus injection amount:  5 unit(s)    L medial occipitalis injection amount:  15 unit(s)    R medial occipitalis injection amount:  15 unit(s)    L superior cervical paraspinal injection amount:  10 unit(s)    R superior cervical paraspinal injection amount:  10 unit(s)   Total Units     Total units used:  200    Total units discarded:  0   Post-procedure details      Chemodenervation:  Chronic migraine    Facial Nerve Location[de-identified]  Bilateral facial nerve    Patient tolerance of procedure: Tolerated well, no immediate complications   Comments      Medically necessary:  - 10 R masseter  - 10 L masseter  - 30 units between the R and L headband region/ scalp  - 25 units between each anterior temporalis  Avoided traps b/l  Blood pressure 145/80, pulse 102, temperature (!) 97 2 °F (36 2 °C), temperature source Temporal, not currently breastfeeding  L jaw pain and ear pain, slightly worse since last seen  Pt requested injection in masseters for this   If botox fails for this, will consider PT

## 2023-05-02 NOTE — PATIENT INSTRUCTIONS
"Headache management instructions  - When patient has a moderate to severe headache, they should seek rest, initiate relaxation and apply cold compresses to the head  - Maintain regular sleep schedule  Adults need at least 7-8 hours of uninterrupted a night  - Limit over the counter medications such as Tylenol, Ibuprofen, Aleve, Excedrin  (No more than 2- 3 times a week or max 10 a month)  - Maintain headache diary  Free MALLIKA for a smart phone, which can be used is \"Migraine eliezer\"  - Limit caffeine to 1-2 cups 8 to 16 oz a day or less  - Avoid dietary trigger  (aged cheese, peanuts, MSG, aspartame and nitrates)  - Patient is to have regular frequent meals to prevent headache onset  - Please drink at least 64 ounces of water a day to help remain hydrated  Botox Therapy  Important Information    Our goal is to make sure you fully understand how Botox Therapy treatment may benefit you and to help you understand how you can play an active role in your treatments and ongoing care  Please review the following information below  Call our office IMMEDIATELY @ [phone] and speak to one of our Botox Coordinators if you have a change in insurance  (a prior authorization is required and accurate information is vital)  Please call at least 24 hours in advance if you can't make your appointment  Appointments are scheduled every 91 days (this will be scheduled in advance before leaving the office)  You must allow for at least 2-3 treatments to determine if Botox is right for you  It may take a few weeks to see a response from treatment  No hair dye or scalp massage or treatment within 24 hours of treatment  We encourage you to use a headache diary or journal to document your headache frequency and severity   You can also utilize the Migraine Eliezer mallika (downloadable on [employer])  Sign up for the Botox Savings Program  (commercial insurance patients may qualify) Sign up at botoxsavingsprogram com or " call 0-987.940.3878 Option: 4  To get more information on Botox therapy for Chronic Migraines and see frequently asked questions, please visit [employer]Soundflavor  If you have any questions or concerns, please speak to one of our Botox Coordinators at [phone]  We look forward to servicing you!

## 2023-05-03 ENCOUNTER — APPOINTMENT (OUTPATIENT)
Dept: PHYSICAL THERAPY | Age: 33
End: 2023-05-03
Payer: COMMERCIAL

## 2023-05-05 DIAGNOSIS — G43.709 CHRONIC MIGRAINE WITHOUT AURA WITHOUT STATUS MIGRAINOSUS, NOT INTRACTABLE: ICD-10-CM

## 2023-05-05 PROBLEM — J32.9 RECURRENT SINUSITIS: Status: RESOLVED | Noted: 2023-03-06 | Resolved: 2023-05-05

## 2023-05-05 NOTE — TELEPHONE ENCOUNTER
Received fax from [facility] requesting refill of Trudhesa  Rx entered   Pls review and sign off    thanks

## 2023-05-08 ENCOUNTER — OFFICE VISIT (OUTPATIENT)
Dept: PHYSICAL THERAPY | Age: 33
End: 2023-05-08

## 2023-05-08 DIAGNOSIS — M54.16 LUMBAR RADICULOPATHY: Primary | ICD-10-CM

## 2023-05-08 RX ORDER — DIHYDROERGOTAMINE MESYLATE 4 MG/ML
SPRAY, METERED NASAL
Qty: 12 ML | Refills: 6 | Status: SHIPPED | OUTPATIENT
Start: 2023-05-08

## 2023-05-08 NOTE — PROGRESS NOTES
"Daily Note     Today's date: 2023  Patient name: Willy Richards  : 1990  MRN: [id number]  Referring provider: Linda Rodriguez PA-C  Dx:   Encounter Diagnosis     ICD-10-CM    1  Lumbar radiculopathy  M54 16           Start Time: 0900  Stop Time: 1000  Total time in clinic (min): 60 minutes    Subjective: Patient noted 4/10 pain today \"I am having a flare and I was having vertigo and bad headache last week\"     Objective: See treatment diary below  Assessment: Decreased symptoms after aqua session today, Pt presented with fatigue by session's end  Plan: Cont with plan of care      Precautions:     Access Code: [email or web address]  URL: [email or web address]  Date: 2023  Prepared by: Tammy Dos Santos Due    Exercises  - Correct Seated Posture  - 3 x daily - 7 x weekly - 2 sets - 10 reps  - Standing Lumbar Extension with Counter  - 3 x daily - 7 x weekly - 2 sets - 10 reps      Manuals                                   Neuro Re-Ed                                       HEP: postural correction seated 2 x             HEP lumbar extension against counter 2 x                                                    Ther Ex                          Water walking pre and post  4 laps  4 laps 4x PRE   Only  4x        Seated hamstring stretch:B:  20SEC 5X  20 sec x 5 20sec 3x  20sec 5x         LAQ:B:  20x  2 x 10 20x  20x        Hip flexion:B:  20x  2 x 10 20x  20x        Seated postural correction slough over correct  20x  2 x 10 20x  20x        Cervical spine retraction  20x 2sec  2 x 10 20x 2sec  20x 3sec         UT stretch:B:  20sec 5x  20 sec x 5 20sec 5x  20sec 5x         LS stretch:B:  20sec 5x  20 sec x 5 20sec 5x  20sec 5x                      Standing scapular squeezes  20x 3sec  3 sec x 20 20x 3sec  20x 3sec         Standing abdominal contraction with ball push down  NT NT 15x  NT        Standing slr x 3:B:  20x  2 x 10 20x  20X         Standing hr and tr:B:  20X  2 x 10 " 20x  20X                     SLS B  10X 10SEC  10 sec x 10 10x 10sec  10X 1S0EC         Mini squats  20X 2 x 10 20x  20X           tandem stance:B:  NT NT NT NT        Side stepping   6x  6 x  6X 6X          tandem ambulation   NT NT 6X 6X        Forward step ups:B:  NT NT NT NT        Standing lumbar spine extension  30X  3 x 10 20X  20x         Paddles rows, horizontal add / abd and shoulder add / abd  NT NT NT NT        Pool pony bicycle  5 MIN  5 min 3 MIN  5 MIN                      Pool pony hang  NT  NT                                                                          Ther Activity                                       Gait Training                                       Modalities

## 2023-05-10 ENCOUNTER — OFFICE VISIT (OUTPATIENT)
Dept: PHYSICAL THERAPY | Age: 33
End: 2023-05-10

## 2023-05-10 DIAGNOSIS — M94.0 COSTOCHONDRITIS: ICD-10-CM

## 2023-05-10 DIAGNOSIS — M79.7 FIBROMYALGIA: ICD-10-CM

## 2023-05-10 DIAGNOSIS — M25.50 ARTHRALGIA OF MULTIPLE SITES: Primary | ICD-10-CM

## 2023-05-10 DIAGNOSIS — M54.16 LUMBAR RADICULOPATHY: ICD-10-CM

## 2023-05-10 NOTE — PROGRESS NOTES
Daily Note     Today's date: 5/10/2023  Patient name: Ricky Canavan  : 1990  MRN: [id number]  Referring provider: Gabino Gaucher, PA-C  Dx:   Encounter Diagnosis     ICD-10-CM    1  Arthralgia of multiple sites  M25 50       2  Lumbar radiculopathy  M54 16       3  Costochondritis  M94 0       4  Fibromyalgia  M79 7                      Subjective: Patient noted 3/10 pain today  Objective: See treatment diary below  Assessment: Decreased pain with aqua based exercises  Progress as able       Plan: Cont with plan of care      Access Code: [employer]: [email or web address]  Date: 2023  Prepared by: John Bones Due    Exercises  - Correct Seated Posture  - 3 x daily - 7 x weekly - 2 sets - 10 reps  - Standing Lumbar Extension with Counter  - 3 x daily - 7 x weekly - 2 sets - 10 reps      Manuals 4/17 4/19 4/24 5/1 5/8 5/10                    Neuro Re-Ed                          HEP: postural correction seated 2 x             HEP lumbar extension against counter 2 x                                                    Ther Ex                          Water walking pre and post  4 laps  4 laps 4x PRE   Only  4x 4x        Seated hamstring stretch:B:  20SEC 5X  20 sec x 5 20sec 3x  20sec 5x  20sec 5x        LAQ:B:  20x  2 x 10 20x  20x 20x        Hip flexion:B:  20x  2 x 10 20x  20x 20x        Seated postural correction slough over correct  20x  2 x 10 20x  20x 20x        Cervical spine retraction  20x 2sec  2 x 10 20x 2sec  20x 3sec  20x 3sec        UT stretch:B:  20sec 5x  20 sec x 5 20sec 5x  20sec 5x  [id number]        LS stretch:B:  20sec 5x  20 sec x 5 20sec 5x  20sec 5x  20sec 5x                     Standing scapular squeezes  20x 3sec  3 sec x 20 20x 3sec  20x 3sec  20x 3sec        Standing abdominal contraction with ball push down  NT NT 15x  NT NT       Standing slr x 3:B:  20x  2 x 10 20x  20X  20X        Standing hr and tr:B:  20X  2 x 10 20x  20X 20X                     SLS B  10X 10SEC  10 sec x 10 10x 10sec  10X 1S0EC  10x 10sec        Mini squats  20X 2 x 10 20x  20X  20X         tandem stance:B:  NT NT NT NT NT       Side stepping   6x  6 x  6X 6X 6X         tandem ambulation   NT NT 6X 6X        Forward step ups:B:  NT NT NT NT NT       Standing lumbar spine extension  30X  3 x 10 20X  20x  20x        Paddles rows, horizontal add / abd and shoulder add / abd  NT NT NT NT NT       Pool pony bicycle  5 MIN  5 min 3 MIN  5 MIN  5 MIN                     Pool pony hang  NT  NT  5 MIN                                                                         Ther Activity                                       Gait Training                                       Modalities

## 2023-05-12 ENCOUNTER — TELEMEDICINE (OUTPATIENT)
Dept: BEHAVIORAL/MENTAL HEALTH CLINIC | Facility: CLINIC | Age: 33
End: 2023-05-12

## 2023-05-12 DIAGNOSIS — F33.1 DEPRESSION, MAJOR, RECURRENT, MODERATE (HCC): Primary | Chronic | ICD-10-CM

## 2023-05-12 DIAGNOSIS — F41.1 GENERALIZED ANXIETY DISORDER: Chronic | ICD-10-CM

## 2023-05-12 NOTE — PSYCH
Virtual Regular Visit    Verification of patient location:    Patient is located at Home in the following state in which I hold an active license [place]    Assessment/Plan:    Problem List Items Addressed This Visit        Other    Generalized anxiety disorder (Chronic)    Depression, major, recurrent, moderate ([facility] [address] ) - Primary (Chronic)     Goals addressed in session: Goal 1      Reason for visit is   Chief Complaint   Patient presents with   • Virtual Regular Visit      Encounter provider APARNA Solomon    Provider located at [address] [place] [id number] [phone]    Recent Visits  No visits were found meeting these conditions  Showing recent visits within past 7 days and meeting all other requirements  Today's Visits  Date Type Provider Dept   05/12/23 [address], [address] today's visits and meeting all other requirements  Future Appointments  No visits were found meeting these conditions  Showing future appointments within next 150 days and meeting all other requirements     The patient was identified by name and date of birth  Annette Graham was informed that this is a telemedicine visit and that [facility]  She agrees to proceed     My office door was closed  No one else was in the room  She acknowledged consent and understanding of privacy and security of the video platform  The patient has agreed to participate and understands they can discontinue the visit at any time  Patient is aware this is a billable service  Augusta Newton is a 33 y o  female        HPI     Past Medical History:   Diagnosis Date   • Abnormal Pap smear of cervix    • Allergic    • Anxiety    • Arthritis    • Depression    • Diabetes mellitus ([facility] [address] )    • Fibromyalgia, primary    • Hypertension • IBS (irritable bowel syndrome)    • Migraine    • Obesity    • Vitamin B12 deficiency        Past Surgical History:   Procedure Laterality Date   • COLONOSCOPY N/A 5/8/2018    Procedure: COLONOSCOPY;  Surgeon: Mary Minaya MD;  Location: [facility] GI LAB; Service: Gastroenterology   • CA EXC B9 LESION MRGN XCP SK TG S/N/H/F/G > 4 0CM N/A 7/1/2021    Procedure: EXCISION OF PILAR SCALP CYST X 2 AND RIGHT INNER GROIN NEVVUS;  Surgeon: Ruth Jim MD;  Location: [address] [place] [address];  Service: Plastics   • CA REPAIR COMPLEX SCALP/ARM/LEG 2 6-7 5 CM N/A 7/1/2021    Procedure: CLOSURE WOUND SCALP X 2 AND RIGHT INNER GROIN;  Surgeon: Ruth Jim MD;  Location: [address] [place] [address];  Service: Plastics   • TONSILLECTOMY     • WISDOM TOOTH EXTRACTION         Current Outpatient Medications   Medication Sig Dispense Refill   • Aimovig 140 MG/ML SOAJ Inject 140mg (1 pen) under the skin every 30 days  1 mL 11   • ALPRAZolam (XANAX) 0 5 mg tablet Take 1 tablet (0 5 mg total) by mouth 2 (two) times a day as needed for anxiety 60 tablet 2   • buPROPion (WELLBUTRIN XL) 300 mg 24 hr tablet Take 1 tablet (300 mg total) by mouth daily 90 tablet 0   • Cholecalciferol (Vitamin D3) 50 MCG (2000 UT) capsule Take 2,000 Units by mouth daily     • cyanocobalamin 1,000 mcg/mL 1 IM injection every week x 4 weeks, then 1 every month 4 mL 2   • dicyclomine (BENTYL) 20 mg tablet Take 1 tablet (20 mg total) by mouth 2 (two) times a day for 7 days 14 tablet 0   • escitalopram (LEXAPRO) 20 mg tablet Take 1 tablet (20 mg total) by mouth daily 90 tablet 0   • etonogestrel-ethinyl estradiol (NuvaRing) 0 12-0 015 MG/24HR vaginal ring Insert ring for 21 days then remove for one week  3 each 4   • FeroSul 325 (65 Fe) MG tablet Take 1 tablet by mouth in the morning     • indomethacin (INDOCIN) 25 mg capsule 1-2 tabs BID prn severe headache with food  Hold toradol   30 capsule 0   • magnesium Oxide (MAG-OX) 400 mg TABS Take 1 tablet (400 mg total) by mouth daily 30 "tablet 0   • methylPREDNISolone 4 MG tablet therapy pack Use as directed on package 21 tablet 0   • OLANZapine (ZyPREXA) 2 5 mg tablet Take 1 tablet (2 5 mg total) by mouth daily Do not take with reglan  30 tablet 2   • onabotulinumtoxin A (BOTOX) 100 units Inject as directed     • ondansetron (ZOFRAN-ODT) 4 mg disintegrating tablet Take 1 tablet (4 mg total) by mouth every 6 (six) hours as needed for nausea or vomiting 90 tablet 0   • pantoprazole (PROTONIX) 40 mg tablet Take 1 tablet (40 mg total) by mouth daily 30 tablet 3   • pramipexole (MIRAPEX) 0 25 mg tablet Take 1 tablet (0 25 mg total) by mouth daily at bedtime 90 tablet 1   • prazosin (MINIPRESS) 2 mg capsule Take 1 capsule (2 mg total) by mouth daily at bedtime 30 capsule 2   • Syringe/Needle, Disp, (SYRINGE 3CC/28YL0-0/2\") 25G X 1-1/2\" 3 ML MISC Use for B12 IM injections  4 each 0   • Trudhesa 0 725 MG/ACT AERS 1 spray in each nostril for total dose of 1 45mg, may repeat 1 dose after 1 hour  Max 2 doses per 24 hours and 3 doses per week  12 mL 6     Current Facility-Administered Medications   Medication Dose Route Frequency Provider Last Rate Last Admin   • cyanocobalamin injection 1,000 mcg  1,000 mcg Intramuscular Q30 Days Terri Marcial, DO   1,000 mcg at 08/03/20 6633   • cyanocobalamin injection 1,000 mcg  1,000 mcg Intramuscular Q14 Days Terri Marcial DO   1,000 mcg at 05/18/20 1146   • cyanocobalamin injection 1,000 mcg  1,000 mcg Intramuscular Q30 Days Terri Marcial, DO   1,000 mcg at 09/01/20 1129        Allergies   Allergen Reactions   • Cimzia [Certolizumab Pegol] Swelling   • Desvenlafaxine      Other reaction(s): state of confusion   • Ixekizumab Vomiting   • Valproic Acid Other (See Comments)     Other reaction(s): dilated pupils, \"schizi\"   • Voltaren [Diclofenac] Swelling   • Tizanidine Anxiety       Review of Systems    Video Exam    There were no vitals filed for this visit      Physical Exam     Behavioral Health Psychotherapy Progress " Note    Psychotherapy Provided: Individual Psychotherapy     1  Depression, major, recurrent, moderate ([address] )        2  Generalized anxiety disorder          Goals addressed in session: Goal 1     DATA: Met with Stephanie for scheduled individual session  She discussed her continued attempts to improve her self-care  She has been continuing to learn about hair and skin care  She continues to struggle with motivation to get out and socialize  She discussed her frustrations with her best friend  She states that she feels that her best friend often picks on her and says statements that hurt her feelings  Devin Blanchard states that she continues to try to set limits with her; however, recently, she has been more avoidant of her friend, as she does not want to engage in conflict  We discussed the mindful decision to separate until she is ready to have a hard conversation with her  Devin Blanchard has been able to spend time with her mother and grandmother  She states that she has also had some time to engage in activities that bring her ciara  She has not been able to engage in much physical activity, outside of PT; however, she continues to plan to do more, as the weather improves  During this session, this clinician used the following therapeutic modalities: Client-centered Therapy, Dialectical Behavior Therapy, Mindfulness-based Strategies, Motivational Interviewing, Solution-Focused Therapy and Supportive Psychotherapy    Substance Abuse was not addressed during this session  If the client is diagnosed with a co-occurring substance use disorder, please indicate any changes in the frequency or amount of use: n/a  Stage of change for addressing substance use diagnoses: No substance use/Not applicable    ASSESSMENT:  Chandrika Leiva presents with a Euthymic/ normal mood  her affect is Normal range and intensity, which is congruent, with her mood and the content of the session  The client has made progress on their goals      Stephanie HILL "Abdulaziz Adame presents with a minimal risk of suicide, minimal risk of self-harm, and minimal risk of harm to others  For any risk assessment that surpasses a \"low\" rating, a safety plan must be developed  A safety plan was indicated: no  If yes, describe in detail n/a    PLAN: Between sessions, Oj Mora will continue to focus on her self-care and activity level  She will work on finding the balance between being active and taking care of her body  She will continue to set appropriate limits and boundaries with friends and family  At the next session, the therapist will use Client-centered Therapy, Dialectical Behavior Therapy, Mindfulness-based Strategies, Motivational Interviewing, Solution-Focused Therapy and Supportive Psychotherapy to address her mood regulation, health-related issues, and relationship issues  Behavioral Health Treatment Plan and Discharge Planning: Oj Mora is aware of and agrees to continue to work on their treatment plan  They have identified and are working toward their discharge goals   yes    Visit start and stop times:    05/12/23  Start Time: 0807  Stop Time: 7506  Total Visit Time: 51 minutes      "

## 2023-05-15 ENCOUNTER — APPOINTMENT (OUTPATIENT)
Dept: PHYSICAL THERAPY | Age: 33
End: 2023-05-15
Payer: COMMERCIAL

## 2023-05-15 ENCOUNTER — OFFICE VISIT (OUTPATIENT)
Dept: FAMILY MEDICINE CLINIC | Facility: CLINIC | Age: 33
End: 2023-05-15

## 2023-05-15 VITALS
DIASTOLIC BLOOD PRESSURE: 90 MMHG | WEIGHT: 260.2 LBS | HEART RATE: 90 BPM | RESPIRATION RATE: 21 BRPM | BODY MASS INDEX: 46.1 KG/M2 | SYSTOLIC BLOOD PRESSURE: 130 MMHG | TEMPERATURE: 97.8 F | HEIGHT: 63 IN | OXYGEN SATURATION: 98 %

## 2023-05-15 DIAGNOSIS — H92.02 LEFT EAR PAIN: Primary | ICD-10-CM

## 2023-05-15 DIAGNOSIS — R51.9 FACIAL PAIN: ICD-10-CM

## 2023-05-15 RX ORDER — DIAZEPAM 5 MG/1
5 TABLET ORAL
Qty: 30 TABLET | Refills: 1 | Status: SHIPPED | OUTPATIENT
Start: 2023-05-15

## 2023-05-15 NOTE — PROGRESS NOTES
"Assessment/Plan: Patient will be referred to physical therapy for left facial pain  Patient will see ENT regarding left ear pain with history of TMJ  Patient will try Valium nightly  Follow-up in 1 month  Diagnoses and all orders for this visit:    Left ear pain  -     Ambulatory Referral to Otolaryngology; Future  -     diazepam (VALIUM) 5 mg tablet; Take 1 tablet (5 mg total) by mouth daily at bedtime as needed for sleep or muscle spasms    Facial pain  -     Ambulatory Referral to Physical Therapy; Future            Subjective:        Patient ID: Oscar Villanueva is a 33 y o  female  Patient is here to follow-up on left stabbing ear pain over the past 2 weeks  Patient was seen in emergency room  Patient was placed on amoxicillin as well as steroids  No improvement noted by the patient  No fever noted  Patient did try to make appointment with ENT  Patient did use bite guard with worsening of pain  The following portions of the patient's history were reviewed and updated as appropriate: allergies, current medications, past family history, past medical history, past social history, past surgical history and problem list       Review of Systems   Constitutional: Negative  HENT: Positive for ear pain  Eyes: Negative  Respiratory: Negative  Cardiovascular: Negative  Gastrointestinal: Negative  Endocrine: Negative  Genitourinary: Negative  Musculoskeletal: Negative  Skin: Negative  Allergic/Immunologic: Negative  Neurological: Negative  Hematological: Negative  Psychiatric/Behavioral: Negative  Objective:               /90 (BP Location: Right arm, Patient Position: Sitting, Cuff Size: Large)   Pulse 90   Temp 97 8 °F (36 6 °C) (Temporal)   Resp 21   Ht 5' 3\" (1 6 m)   Wt 118 kg (260 lb 3 2 oz)   SpO2 98%   BMI 46 09 kg/m²          Physical Exam  Vitals and nursing note reviewed     Constitutional:       General: She is not in acute " distress  Appearance: Normal appearance  She is not ill-appearing, toxic-appearing or diaphoretic  HENT:      Head: Normocephalic and atraumatic  Right Ear: Tympanic membrane, ear canal and external ear normal  There is no impacted cerumen  Left Ear: Tympanic membrane, ear canal and external ear normal  There is no impacted cerumen  Musculoskeletal:         General: Tenderness present  Comments: Tenderness with palpation behind left ear, scalp, TMJ on the left  Neurological:      Mental Status: She is alert

## 2023-05-17 ENCOUNTER — OFFICE VISIT (OUTPATIENT)
Dept: PHYSICAL THERAPY | Age: 33
End: 2023-05-17

## 2023-05-17 DIAGNOSIS — M46.90 INFLAMMATORY SPONDYLOPATHY, UNSPECIFIED SPINAL REGION (HCC): Primary | ICD-10-CM

## 2023-05-17 DIAGNOSIS — M54.16 LUMBAR RADICULOPATHY: ICD-10-CM

## 2023-05-17 NOTE — PROGRESS NOTES
"Daily Note     Today's date: 2023  Patient name: Mignon Vallejo  : 1990  MRN: [id number]  Referring provider: Vernon Avalos PA-C  Dx:   Encounter Diagnosis     ICD-10-CM    1  Inflammatory spondylopathy, unspecified spinal region ([facility] [address] )  M46 90       2  Lumbar radiculopathy  M54 16                      Subjective: Patient noted 2-3/10 pain today \"all over\"     Objective: See treatment diary below  Assessment: Improving slowly with symptoms  Plan: Cont with plan of care      Access Code: [employer]: [email or web address]  Date: 2023  Prepared by: Charlette Rizzo Due    Exercises  - Correct Seated Posture  - 3 x daily - 7 x weekly - 2 sets - 10 reps  - Standing Lumbar Extension with Counter  - 3 x daily - 7 x weekly - 2 sets - 10 reps      Manuals 4/17 4/19 4/24 5/1 5/8 5/10 5/17                   Neuro Re-Ed                          HEP: postural correction seated 2 x             HEP lumbar extension against counter 2 x                                                    Ther Ex                          Water walking pre and post  4 laps  4 laps 4x PRE   Only  4x 4x  4x      Seated hamstring stretch:B:  20SEC 5X  20 sec x 5 20sec 3x  20sec 5x  20sec 5x  20sec 5x       LAQ:B:  20x  2 x 10 20x  20x 20x  20x       Hip flexion:B:  20x  2 x 10 20x  20x 20x  20x       Seated postural correction slough over correct  20x  2 x 10 20x  20x 20x  20x       Cervical spine retraction  20x 2sec  2 x 10 20x 2sec  20x 3sec  20x 3sec  20x 3sec       UT stretch:B:  20sec 5x  20 sec x 5 20sec 5x  20sec 5x  [id number]  20sec 5x      LS stretch:B:  20sec 5x  20 sec x 5 20sec 5x  20sec 5x  20sec 5x  20sec 5x                    Standing scapular squeezes  20x 3sec  3 sec x 20 20x 3sec  20x 3sec  20x 3sec  20x 3sec       Standing abdominal contraction with ball push down  NT NT 15x  NT NT NT      Standing slr x 3:B:  20x  2 x 10 20x  20X  20X  20X       Standing hr and tr:B:  20X  2 x 10 20x  20X 20X  " 20X                    SLS B  10X 10SEC  10 sec x 10 10x 10sec  10X 1S0EC  10x 10sec  10X 10SEC       Mini squats  20X 2 x 10 20x  20X  20X 20X         tandem stance:B:  NT NT NT NT NT NT      Side stepping   6x  6 x  6X 6X 6X 6X         tandem ambulation   NT NT 6X 6X        Forward step ups:B:  NT NT NT NT NT NT      Standing lumbar spine extension  30X  3 x 10 20X  20x  20x  20X       Paddles rows, horizontal add / abd and shoulder add / abd  NT NT NT NT NT NT      Pool pony bicycle  5 MIN  5 min 3 MIN  5 MIN  5 MIN  5 MIN                    Pool pony hang  NT  NT  5 MIN  5 MIN                                                                        Ther Activity                                       Gait Training                                       Modalities

## 2023-05-19 ENCOUNTER — OFFICE VISIT (OUTPATIENT)
Dept: PHYSICAL THERAPY | Age: 33
End: 2023-05-19

## 2023-05-19 DIAGNOSIS — M79.7 FIBROMYALGIA: Primary | ICD-10-CM

## 2023-05-19 NOTE — PROGRESS NOTES
"Daily Note     Today's date: 2023  Patient name: Naseem Bradley  : 1990  MRN: [id number]  Referring provider: Alberta Schuler PA-C  Dx:   Encounter Diagnosis     ICD-10-CM    1  Fibromyalgia  M79 7                      Subjective: Patient noted an increase in body pain to a 7/10 pain today due to her report of  \"I am having a flare, it hurts everywhere\"  Objective: See treatment diary below  Assessment: Patient presents with directional preference as repeated lumbar spine extension in standing as providing short term pain reduction, but she lacks long term pain reduction that limits all weight baring based functional activities  Plus, she is able to perform pool based therapeutic exercises but has to perform in an arc of motion due to pain aggravation secondary to \"flare up\"  Thus, PT is warranted to promote pain reduction to promote functional progress        Plan: Cont with plan of care             Manuals 4/17 4/19 4/24 5/1 5/8 5/10 5/17 5/19                  Neuro Re-Ed                          HEP: postural correction seated 2 x             HEP lumbar extension against counter 2 x                                                    Ther Ex                          Water walking pre and post  4 laps  4 laps 4x PRE   Only  4x 4x  4x 6x      Seated hamstring stretch:B:  20SEC 5X  20 sec x 5 20sec 3x  20sec 5x  20sec 5x  20sec 5x  20 sec x 5     LAQ:B:  20x  2 x 10 20x  20x 20x  20x  20x      Hip flexion:B:  20x  2 x 10 20x  20x 20x  20x  20x      Seated postural correction slough over correct  20x  2 x 10 20x  20x 20x  20x  NT     Cervical spine retraction  20x 2sec  2 x 10 20x 2sec  20x 3sec  20x 3sec  20x 3sec  3 sec x 20     UT stretch:B:  20sec 5x  20 sec x 5 20sec 5x  20sec 5x  [id number]  20sec 5x 20 sec x 5     LS stretch:B:  20sec 5x  20 sec x 5 20sec 5x  20sec 5x  20sec 5x  20sec 5x  20 sec x 5                  Standing scapular squeezes  20x 3sec  3 sec x 20 20x 3sec  20x 3sec  20x " 3sec  20x 3sec  3 sec x 20     Standing abdominal contraction with ball push down  NT NT 15x  NT NT NT NT     Standing slr x 3:B:  20x  2 x 10 20x  20X  20X  20X  2 x 10     Standing hr and tr:B:  20X  2 x 10 20x  20X 20X  20X  2 x 10                  SLS B  10X 10SEC  10 sec x 10 10x 10sec  10X 1S0EC  10x 10sec  10X 10SEC  10 sec x 10     Mini squats  20X 2 x 10 20x  20X  20X 20X  Hold       tandem stance:B:  NT NT NT NT NT NT NT     Side stepping   6x  6 x  6X 6X 6X 6X  6X        tandem ambulation   NT NT 6X 6X        Forward step ups:B:  NT NT NT NT NT NT NT     Standing lumbar spine extension  30X  3 x 10 20X  20x  20x  20X  2 x 10     Paddles rows, horizontal add / abd and shoulder add / abd  NT NT NT NT NT NT NT     Pool pony bicycle  5 MIN  5 min 3 MIN  5 MIN  5 MIN  5 MIN  Hold today reassess                 Pool pony hang  NT  NT  5 MIN  5 MIN  Hold today reassess                                                                     Ther Activity                                       Gait Training                                       Modalities

## 2023-05-23 DIAGNOSIS — F43.12 CHRONIC POST-TRAUMATIC STRESS DISORDER (PTSD): ICD-10-CM

## 2023-05-23 RX ORDER — PRAZOSIN HYDROCHLORIDE 2 MG/1
2 CAPSULE ORAL
Qty: 30 CAPSULE | Refills: 2 | Status: SHIPPED | OUTPATIENT
Start: 2023-05-23

## 2023-05-23 NOTE — TELEPHONE ENCOUNTER
Medication Refill Request     Name of Medication Minipress  Dose/Frequency 2mg 1 at bedtime  Quantity 30 capsule  Verified pharmacy   [x]  Verified ordering Provider   [x]  Does patient have enough for the next 3 days? Yes [] No []  Does patient have a follow-up appointment scheduled?  Yes [x] No []   If so when is appointment: 7/13

## 2023-05-26 ENCOUNTER — TELEMEDICINE (OUTPATIENT)
Dept: BEHAVIORAL/MENTAL HEALTH CLINIC | Facility: CLINIC | Age: 33
End: 2023-05-26

## 2023-05-26 DIAGNOSIS — F33.1 DEPRESSION, MAJOR, RECURRENT, MODERATE (HCC): Primary | Chronic | ICD-10-CM

## 2023-05-26 DIAGNOSIS — F41.1 GENERALIZED ANXIETY DISORDER: Chronic | ICD-10-CM

## 2023-05-26 NOTE — BH TREATMENT PLAN
Outpatient Behavioral Health Psychotherapy Treatment Plan    Dxa Vance  1990     Date of Initial Psychotherapy Assessment: June 26, 2019     Date of Current Treatment Plan: 05/26/23  Treatment Plan Target Date: 09/23/2023  Treatment Plan Expiration Date: 11/22/23    Diagnosis:   1  Depression, major, recurrent, moderate ([facility] [address] )        2  Generalized anxiety disorder          Area(s) of Need: Mood regulation; relationships; physical health issues    Long Term Goal 1 (in the client's own words): I want to manage my anxiety and depression  I want to love myself  I will have a more positive outlook on life  Stage of Change: Action    Target Date for completion: May 26, 2023     Anticipated therapeutic modalities: client-centered therapy; dialectical behavior therapy; motivational interviewing     People identified to complete this goal: Vadimjames Le (client); Ansley Couch (psychotherapist); Dr Amy Lawrence (psychiatrist)      Objective 1: (identify the means of measuring success in meeting the objective): Vadim Le will meet with Dr Amy Lawrence for medication management sessions  She will maintain adherence with her medication regimen  She reports no significant side effects at this time  She currently reports missing 1-2 doses of medications occasionally--primarily due to her medical issues and levels of fatigue  Objective 2: (identify the means of measuring success in meeting the objective): Vadim Le will participate in DBT-informed therapy-- to learn and practice a minimum of three distress-tolerance skills  She will discuss successes and barriers in her therapy sessions  Objective 3: (identify the means of measuring success in meeting the objective): Vadim Le will practice DBT-informed effective communication skills  She will continue to set limits/boundaries with friends/family and will discuss successes and barriers in her therapy sessions         Objective 4: (identify the means of measuring success in meeting the objective): Aissatou Brooke will discuss medical issues and impact on MH symptoms  She will continue to find a balance between pushing her physical activity level and managing overall emotional and physical fatigue  We will discuss successes and barriers in her therapy sessions  I am currently under the care of a [facility] psychiatric provider: yes    My [facility] psychiatric provider is: Dr Elizabeth Montalvo am currently taking psychiatric medications: Yes, as prescribed    I feel that I will be ready for discharge from mental health care when I reach the following (measurable goal/objective): I will not have as much anxiety as I approach an event or an activity  I will just be able to do it, without having to over plan  For children and adults who have a legal guardian:   Has there been any change to custody orders and/or guardianship status? NA  If yes, attach updated documentation  I have created my Crisis Plan and have been offered a copy of this plan    [address]: Diagnosis and Treatment Plan explained to Didier Sterling acknowledges an understanding of their diagnosis  Ita Kaur agrees to this treatment plan      I have been offered a copy of this Treatment Plan  yes

## 2023-05-26 NOTE — BH CRISIS PLAN
"Client Name: Lyn Porras       Client YOB: 1990  : 1990    Treatment Team (include name and contact information):     Psychotherapist: Maryan Singh    Psychiatrist: Dr Bruno Barahona of information completed: not needed  Healthcare Provider  Lawyer Aguiar, [address] [place] [id number]  [phone]    Type of Plan   * Child plans (children 15 yo and younger) must be completed and signed by the child's legal guardian   * Plans for all individuals 15 yo and above must be signed by the client  Plan Type: adolescent/adult (15 and over) Initial      My Personal Strengths are (in the client's own words):  * willingness to use coping skills  * self-awareness  * support from family and friends  * willingness to reach out for help    The stressors and triggers that may put me at risk are:  * When someone puts me down or is disrespectful to me  * When I don't stand up for myself (I freeze and have to come back to it later)  * Physical health symptoms  * Disorganization in my living area    Coping skills I can use to keep myself calm and safe: * Sometimes breathing helps  * Listening to music  * 5,4,3,2,1 skill  * Reassuring myself    Coping skills/supports I can use to maintain abstinence from substance use:   * not applicable    The people that provide me with help and support: (Include name, contact, and how they can help)   Support person #1: Mom    * Phone number: in my phone    * How can they help me? She helps me to put things into reality  She brings my anxiety-level down  My mom is a good listener and a \"do-er\"  Support person #2:Grandmother    * Phone number: in my phone    * How can they help me? She is my supporter  She roots for me  She gives me a lot of reassurance that I can be successful  Support person #3: [address]     * Phone number: in phone    * How can they help me? She is someone I can call if I am in a panic  She helps to distract me       In the " past, the following has helped me in times of crisis:    Taking medications, Talking to a professional on the telephone, Calling a friend, Calling a family member, Breathing exercises (or other mindfulness-based activities), Using de-escalation tools that I have learned and Listening to music      If it is an emergency and you need immediate help, call     If there is a possibility of danger to yourself or others, call the following crisis hotline resources:     Adult Crisis Numbers  Suicide Prevention Hotline - Dial   [facility]: Buffy Schaffer 13: R Caterina 56: [address]: [address] [place] [address] Avenue: [address] [place] [address] Street: [address] [place] [address] Avenue: [address] [place] [address] St: [phone] (daytime)  [phone] (after hours, weekends, holidays)     Child/Adolescent Crisis Numbers   [facility]: Debra Florian 10: [phone]   [facility]: [phone]   [facility]: [phone]    Please note: Some [facility] do not have a separate number for Child/Adolescent specific crisis  If your county is not listed under Child/Adolescent, please call the adult number for your county     National Talk to Text Line   All Ages - [phone]    In the event your feelings become unmanageable, and you cannot reach your support system, you will call 911 immediately or go to the nearest hospital emergency room

## 2023-05-26 NOTE — PSYCH
Virtual Regular Visit    Verification of patient location:    Patient is located at Home in the following state in which I hold an active license [place]      Assessment/Plan:    Problem List Items Addressed This Visit        Other    Generalized anxiety disorder (Chronic)    Depression, major, recurrent, moderate ([address] ) - Primary (Chronic)       Goals addressed in session: Goal 1          Reason for visit is   Chief Complaint   Patient presents with   • Virtual Regular Visit        Encounter provider APARNA Hector    Provider located at [address] [place], [place] [address]  [facility] [address]  [phone]      Recent Visits  Date Type Provider Dept   05/26/23 1920 [facility], [address] [place] [address]   Showing recent visits within past 7 days and meeting all other requirements  Future Appointments  No visits were found meeting these conditions  Showing future appointments within next 150 days and meeting all other requirements       The patient was identified by name and date of birth  Sonido Pedro was informed that this is a telemedicine visit and that [facility]  She agrees to proceed     My office door was closed  No one else was in the room  She acknowledged consent and understanding of privacy and security of the video platform  The patient has agreed to participate and understands they can discontinue the visit at any time  Patient is aware this is a billable service  Gray Bell is a 33 y o  female      HPI     Past Medical History:   Diagnosis Date   • Abnormal Pap smear of cervix    • Allergic    • Anxiety    • Arthritis    • Depression    • Diabetes mellitus ([address] )    • Fibromyalgia, primary    • Hypertension    • IBS (irritable bowel syndrome)    • Migraine    • Obesity    • Vitamin B12 deficiency        Past Surgical History:   Procedure Laterality Date   • COLONOSCOPY N/A 5/8/2018    Procedure: COLONOSCOPY;  Surgeon: Mary Gale MD;  Location: [facility] GI LAB; Service: Gastroenterology   • [place] EXC B9 LESION MRGN XCP SK TG S/N/H/F/G > 4 0CM N/A 7/1/2021    Procedure: EXCISION OF PILAR SCALP CYST X 2 AND RIGHT INNER GROIN NEVVUS;  Surgeon: Chandni Parra MD;  Location: [facility] MAIN OR;  Service: Plastics   • [place] REPAIR COMPLEX SCALP/ARM/LEG 2 6-7 5 CM N/A 7/1/2021    Procedure: CLOSURE WOUND SCALP X 2 AND RIGHT INNER GROIN;  Surgeon: Chandni Parra MD;  Location: [facility] MAIN OR;  Service: Plastics   • TONSILLECTOMY     • WISDOM TOOTH EXTRACTION         Current Outpatient Medications   Medication Sig Dispense Refill   • Aimovig 140 MG/ML SOAJ Inject 140mg (1 pen) under the skin every 30 days  1 mL 11   • ALPRAZolam (XANAX) 0 5 mg tablet Take 1 tablet (0 5 mg total) by mouth 2 (two) times a day as needed for anxiety 60 tablet 2   • buPROPion (WELLBUTRIN XL) 300 mg 24 hr tablet Take 1 tablet (300 mg total) by mouth daily 90 tablet 0   • Cholecalciferol (Vitamin D3) 50 MCG (2000 UT) capsule Take 2,000 Units by mouth daily     • cyanocobalamin 1,000 mcg/mL 1 IM injection every week x 4 weeks, then 1 every month 4 mL 2   • diazepam (VALIUM) 5 mg tablet Take 1 tablet (5 mg total) by mouth daily at bedtime as needed for sleep or muscle spasms 30 tablet 1   • dicyclomine (BENTYL) 20 mg tablet Take 1 tablet (20 mg total) by mouth 2 (two) times a day for 7 days 14 tablet 0   • escitalopram (LEXAPRO) 20 mg tablet Take 1 tablet (20 mg total) by mouth daily 90 tablet 0   • etonogestrel-ethinyl estradiol (NuvaRing) 0 12-0 015 MG/24HR vaginal ring Insert ring for 21 days then remove for one week  3 each 4   • FeroSul 325 (65 Fe) MG tablet Take 1 tablet by mouth in the morning     • indomethacin (INDOCIN) 25 mg capsule 1-2 tabs BID prn severe headache with food  Hold toradol   30 capsule 0   • magnesium Oxide (MAG-OX) 400 mg TABS Take 1 tablet "(400 mg total) by mouth daily 30 tablet 0   • methylPREDNISolone 4 MG tablet therapy pack Use as directed on package 21 tablet 0   • OLANZapine (ZyPREXA) 2 5 mg tablet Take 1 tablet (2 5 mg total) by mouth daily Do not take with reglan  30 tablet 2   • onabotulinumtoxin A (BOTOX) 100 units Inject as directed     • ondansetron (ZOFRAN-ODT) 4 mg disintegrating tablet Take 1 tablet (4 mg total) by mouth every 6 (six) hours as needed for nausea or vomiting 90 tablet 0   • pantoprazole (PROTONIX) 40 mg tablet Take 1 tablet (40 mg total) by mouth daily 30 tablet 3   • pramipexole (MIRAPEX) 0 25 mg tablet Take 1 tablet (0 25 mg total) by mouth daily at bedtime 90 tablet 1   • prazosin (MINIPRESS) 2 mg capsule Take 1 capsule (2 mg total) by mouth daily at bedtime 30 capsule 2   • Syringe/Needle, Disp, (SYRINGE 3CC/40BR6-4/2\") 25G X 1-1/2\" 3 ML MISC Use for B12 IM injections  4 each 0   • Trudhesa 0 725 MG/ACT AERS 1 spray in each nostril for total dose of 1 45mg, may repeat 1 dose after 1 hour  Max 2 doses per 24 hours and 3 doses per week  12 mL 6     Current Facility-Administered Medications   Medication Dose Route Frequency Provider Last Rate Last Admin   • cyanocobalamin injection 1,000 mcg  1,000 mcg Intramuscular Q30 Days Robertha Mazzoni, DO   1,000 mcg at 08/03/20 5330   • cyanocobalamin injection 1,000 mcg  1,000 mcg Intramuscular Q14 Days Robertha Mazzoni, DO   1,000 mcg at 05/18/20 1146   • cyanocobalamin injection 1,000 mcg  1,000 mcg Intramuscular Q30 Days Robertha Mazzoni, DO   1,000 mcg at 09/01/20 1129        Allergies   Allergen Reactions   • Cimzia [Certolizumab Pegol] Swelling   • Desvenlafaxine      Other reaction(s): state of confusion   • Ixekizumab Vomiting   • Valproic Acid Other (See Comments)     Other reaction(s): dilated pupils, \"schizi\"   • Voltaren [Diclofenac] Swelling   • Tizanidine Anxiety       Review of Systems    Video Exam    There were no vitals filed for this visit      Physical Exam     Behavioral " Health Psychotherapy Progress Note    Psychotherapy Provided: Individual Psychotherapy     1  Depression, major, recurrent, moderate ([address] )        2  Generalized anxiety disorder            Goals addressed in session: Goal 1     DATA: Met with Stephanie for scheduled individual session  Bradford Carpenter continues to spend time on her new self-care routine  She states that she has been doing fairly well  She discussed a recent interaction with her uncle, which was very positive  Bradford Carpenter states that her grandmother was recently in the hospital, but she is doing much better at this point  Bradford Carpenter discussed a recent conversation with her friend Nolvia Colvin  She states that she was able to set an appropriate limit with her friend  We spent some time discussing Stephanie's treatment plan and created a crisis plan  Bradford Carpenter wants to continue to work on learning and practicing mood regulation skills  She continues to work on finding a balance between overdoing physical activity and pushing herself to develop her strength and endurance  During this session, this clinician used the following therapeutic modalities: Client-centered Therapy, Dialectical Behavior Therapy, Mindfulness-based Strategies, Motivational Interviewing, Solution-Focused Therapy and Supportive Psychotherapy    Substance Abuse was not addressed during this session  If the client is diagnosed with a co-occurring substance use disorder, please indicate any changes in the frequency or amount of use: n/a  Stage of change for addressing substance use diagnoses: No substance use/Not applicable    ASSESSMENT:  Calvin Koo presents with a Euthymic/ normal mood  her affect is Normal range and intensity, which is congruent, with her mood and the content of the session  The client has made progress on their goals  Calvin Koo presents with a minimal risk of suicide, minimal risk of self-harm, and minimal risk of harm to others      For any risk assessment that surpasses a "\"low\" rating, a safety plan must be developed  A safety plan was indicated: no  If yes, describe in detail n/a    PLAN: Between sessions, Lisa Diaz will continue to set and maintain limits with family members and friends  She will continue to work on eating nutritiously and exercising    At the next session, the therapist will use Client-centered Therapy, Dialectical Behavior Therapy, Mindfulness-based Strategies, Motivational Interviewing, Solution-Focused Therapy and Supportive Psychotherapy to address her mood regulation, motivation, and physical issues       Behavioral Health Treatment Plan and Discharge Planning: Lisa Diaz is aware of and agrees to continue to work on their treatment plan  They have identified and are working toward their discharge goals   yes    Visit start and stop times:    05/26/23  Start Time: 0931  Stop Time: 1021  Total Visit Time: 50 minutes      "

## 2023-06-02 ENCOUNTER — OFFICE VISIT (OUTPATIENT)
Dept: PHYSICAL THERAPY | Age: 33
End: 2023-06-02

## 2023-06-02 DIAGNOSIS — M79.7 FIBROMYALGIA: Primary | ICD-10-CM

## 2023-06-02 NOTE — PROGRESS NOTES
"Daily Note     Today's date: 2023  Patient name: Tamiko Collier  : 1990  MRN: [id number]  Referring provider: Azul John PA-C  Dx:   Encounter Diagnosis     ICD-10-CM    1  Fibromyalgia  M79 7                      Subjective: Patient noted 3/10 pain \"mostly on my foot I luanne had a fall\"       Objective: See treatment diary below  Assessment: Good tolerance to exercises in aqua environment   Progress as able       Plan: Cont with plan of care             Manuals 4/17 4/19 4/24 5/1 5/8 5/10 5/17 5/19 6/2                                            HEP: postural correction seated 2 x             HEP lumbar extension against counter 2 x                                                    Ther Ex                          Water walking pre and post  4 laps  4 laps 4x PRE   Only  4x 4x  4x 6x  6x     Seated hamstring stretch:B:  20SEC 5X  20 sec x 5 20sec 3x  20sec 5x  20sec 5x  20sec 5x  20 sec x 5 10sec 5x     LAQ:B:  20x  2 x 10 20x  20x 20x  20x  20x  20x     Hip flexion:B:  20x  2 x 10 20x  20x 20x  20x  20x  20x     Seated postural correction slough over correct  20x  2 x 10 20x  20x 20x  20x  NT 20x     Cervical spine retraction  20x 2sec  2 x 10 20x 2sec  20x 3sec  20x 3sec  20x 3sec  3 sec x 20 15x 2sec     UT stretch:B:  20sec 5x  20 sec x 5 20sec 5x  20sec 5x  [id number]  20sec 5x 20 sec x 5 20sec 5x     LS stretch:B:  20sec 5x  20 sec x 5 20sec 5x  20sec 5x  20sec 5x  20sec 5x  20 sec x 5 20sec 5x                 Standing scapular squeezes  20x 3sec  3 sec x 20 20x 3sec  20x 3sec  20x 3sec  20x 3sec  3 sec x 20 15x 3sec     Standing abdominal contraction with ball push down  NT NT 15x  NT NT NT NT     Standing slr x 3:B:  20x  2 x 10 20x  20X  20X  20X  2 x 10 20x     Standing hr and tr:B:  20X  2 x 10 20x  20X 20X  20X  2 x 10 20x                  SLS B  10X 10SEC  10 sec x 10 10x 10sec  10X 1S0EC  10x 10sec  10X 10SEC  10 sec x 10 10sec 10x     Mini squats  20X 2 x 10 20x  20X  20X 20X  " Hold NT      tandem stance:B:  NT NT NT NT NT NT NT NT    Side stepping   6x  6 x  6X 6X 6X 6X  6X  6X       tandem ambulation   NT NT 6X 6X        Forward step ups:B:  NT NT NT NT NT NT NT NT    Standing lumbar spine extension  30X  3 x 10 20X  20x  20x  20X  2 x 10 20X     Paddles rows, horizontal add / abd and shoulder add / abd  NT NT NT NT NT NT NT NT    Pool pony bicycle  5 MIN  5 min 3 MIN  5 MIN  5 MIN  5 MIN  Hold today 8 MIN                  Pool pony hang  NT  NT  5 MIN  5 MIN  Hold today reassess                                                                     Ther Activity                                       Gait Training                                       Modalities

## 2023-06-05 ENCOUNTER — OFFICE VISIT (OUTPATIENT)
Dept: PHYSICAL THERAPY | Age: 33
End: 2023-06-05
Payer: COMMERCIAL

## 2023-06-05 DIAGNOSIS — M54.16 LUMBAR RADICULOPATHY: ICD-10-CM

## 2023-06-05 DIAGNOSIS — M79.7 FIBROMYALGIA: ICD-10-CM

## 2023-06-05 DIAGNOSIS — M94.0 COSTOCHONDRITIS: Primary | ICD-10-CM

## 2023-06-05 DIAGNOSIS — M46.90 INFLAMMATORY SPONDYLOPATHY, UNSPECIFIED SPINAL REGION (HCC): ICD-10-CM

## 2023-06-05 PROCEDURE — 97113 AQUATIC THERAPY/EXERCISES: CPT

## 2023-06-05 NOTE — PROGRESS NOTES
"Daily Note     Today's date: 2023  Patient name: Wilmer Ramirez  : 1990  MRN: [id number]  Referring provider: Baron Narendra PA-C  Dx:   Encounter Diagnosis     ICD-10-CM    1  Costochondritis  M94 0       2  Fibromyalgia  M79 7       3  Inflammatory spondylopathy, unspecified spinal region ([address] )  M46 90       4  Lumbar radiculopathy  M54 16                      Subjective: Patient noted \"my L foot keeps hurting\"       Objective: See treatment diary below  1:1 time Saskia GOODWIN, DPT 30 min       Assessment: Pain at L foot with exercises   Progress as able       Plan: Cont with plan of care             Manuals 4/17 4/19 4/24 5/1 5/8 5/10 5/17 5/19 6/2 6/5                                           HEP: postural correction seated 2 x             HEP lumbar extension against counter 2 x                                                    Ther Ex                          Water walking pre and post  4 laps  4 laps 4x PRE   Only  4x 4x  4x 6x  6x  6x    Seated hamstring stretch:B:  20SEC 5X  20 sec x 5 20sec 3x  20sec 5x  20sec 5x  20sec 5x  20 sec x 5 10sec 5x  20sec 5x    LAQ:B:  20x  2 x 10 20x  20x 20x  20x  20x  20x  20x    Hip flexion:B:  20x  2 x 10 20x  20x 20x  20x  20x  20x  20x    Seated postural correction slough over correct  20x  2 x 10 20x  20x 20x  20x  NT 20x  20x    Cervical spine retraction  20x 2sec  2 x 10 20x 2sec  20x 3sec  20x 3sec  20x 3sec  3 sec x 20 15x 2sec  13x 2sec    UT stretch:B:  20sec 5x  20 sec x 5 20sec 5x  20sec 5x  [id number]  20sec 5x 20 sec x 5 20sec 5x  20sec 5x    LS stretch:B:  20sec 5x  20 sec x 5 20sec 5x  20sec 5x  20sec 5x  20sec 5x  20 sec x 5 20sec 5x 20sec 5x                 Standing scapular squeezes  20x 3sec  3 sec x 20 20x 3sec  20x 3sec  20x 3sec  20x 3sec  3 sec x 20 15x 3sec  13x 3sec    Standing abdominal contraction with ball push down  NT NT 15x  NT NT NT NT  NT   Standing slr x 3:B:  20x  2 x 10 20x  20X  20X  20X  2 x 10 20x  20X    Standing hr and tr:B: " 20X  2 x 10 20x  20X 20X  20X  2 x 10 20x  20X                 SLS B  10X 10SEC  10 sec x 10 10x 10sec  10X 1S0EC  10x 10sec  10X 10SEC  10 sec x 10 10sec 10x  10SEC 10X    Mini squats  20X 2 x 10 20x  20X  20X 20X  Hold NT 20X     tandem stance:B:  NT NT NT NT NT NT NT NT 10X 10SEC    Side stepping   6x  6 x  6X 6X 6X 6X  6X  6X  6X      tandem ambulation   NT NT 6X 6X        Forward step ups:B:  NT NT NT NT NT NT NT NT NT   Standing lumbar spine extension  30X  3 x 10 20X  20x  20x  20X  2 x 10 20X  20X    Paddles rows, horizontal add / abd and shoulder add / abd  NT NT NT NT NT NT NT NT NT   Pool pony bicycle  5 MIN  5 min 3 MIN  5 MIN  5 MIN  5 MIN  Hold today 8 MIN  NT                Pool pony hang  NT  NT  5 MIN  5 MIN  Hold today reassess D/C   Pain at B UE                                                                     Ther Activity                                       Gait Training                                       Modalities

## 2023-06-07 ENCOUNTER — APPOINTMENT (OUTPATIENT)
Dept: PHYSICAL THERAPY | Age: 33
End: 2023-06-07
Payer: COMMERCIAL

## 2023-06-08 ENCOUNTER — OFFICE VISIT (OUTPATIENT)
Dept: FAMILY MEDICINE CLINIC | Facility: CLINIC | Age: 33
End: 2023-06-08
Payer: COMMERCIAL

## 2023-06-08 VITALS
DIASTOLIC BLOOD PRESSURE: 92 MMHG | BODY MASS INDEX: 46.21 KG/M2 | OXYGEN SATURATION: 98 % | SYSTOLIC BLOOD PRESSURE: 142 MMHG | HEART RATE: 93 BPM | WEIGHT: 260.8 LBS | HEIGHT: 63 IN | TEMPERATURE: 98.9 F

## 2023-06-08 DIAGNOSIS — M94.0 COSTOCHONDRITIS: Primary | ICD-10-CM

## 2023-06-08 DIAGNOSIS — M26.629 TMJ PAIN DYSFUNCTION SYNDROME: ICD-10-CM

## 2023-06-08 PROCEDURE — 99214 OFFICE O/P EST MOD 30 MIN: CPT | Performed by: FAMILY MEDICINE

## 2023-06-08 RX ORDER — PREDNISONE 5 MG/1
TABLET ORAL
COMMUNITY
Start: 2023-06-07

## 2023-06-08 NOTE — PROGRESS NOTES
"Assessment/Plan: As directed  Patient is picking up prescription  Patient will use Valium at night as directed  Patient will see ENT  Patient will follow-up as needed in this regard  Diagnoses and all orders for this visit:    Costochondritis    TMJ pain dysfunction syndrome    Other orders  -     predniSONE 5 mg tablet            Subjective:        Patient ID: Tejas Mock is a 33 y o  female  Patient is here to follow-up on left ear pain and facial pain  Patient was unable to see ENT  Patient is trying to get into physical therapy  Pain is slightly improved with use of Valium for roughly 1 week  Patient did sleep better with medication  Patient with crusting left nostril  Patient to use doxycycline for short period of time  Patient is on infusions  Patient also with right-sided chest pain which began today when reaching for toilet paper  No vomiting  Patient has pain taking a deep breath  No new swelling of lower extremities  No calf pain  No fever noted  Patient to use lidocaine patch  The following portions of the patient's history were reviewed and updated as appropriate: allergies, current medications, past family history, past medical history, past social history, past surgical history and problem list       Review of Systems   Constitutional: Negative  HENT: Positive for ear pain, facial swelling and rhinorrhea  Eyes: Negative  Respiratory: Negative  Cardiovascular: Positive for chest pain  Gastrointestinal: Negative  Endocrine: Negative  Genitourinary: Negative  Musculoskeletal: Negative  Skin: Negative  Allergic/Immunologic: Negative  Neurological: Negative  Hematological: Negative  Psychiatric/Behavioral: Negative              Objective:               /92 (BP Location: Right arm, Patient Position: Sitting, Cuff Size: Large)   Pulse 93   Temp 98 9 °F (37 2 °C) (Tympanic)   Ht 5' 3\" (1 6 m)   Wt 118 kg (260 lb 12 8 oz)  " SpO2 98%   BMI 46 20 kg/m²          Physical Exam  Vitals and nursing note reviewed  Constitutional:       General: She is not in acute distress  Appearance: Normal appearance  She is not ill-appearing, toxic-appearing or diaphoretic  HENT:      Head: Normocephalic and atraumatic  Right Ear: Tympanic membrane, ear canal and external ear normal  There is no impacted cerumen  Left Ear: Tympanic membrane, ear canal and external ear normal  There is no impacted cerumen  Nose: Nose normal  No congestion or rhinorrhea  Mouth/Throat:      Mouth: Mucous membranes are moist       Pharynx: No oropharyngeal exudate or posterior oropharyngeal erythema  Eyes:      General: No scleral icterus  Right eye: No discharge  Left eye: No discharge  Extraocular Movements: Extraocular movements intact  Conjunctiva/sclera: Conjunctivae normal       Pupils: Pupils are equal, round, and reactive to light  Neck:      Vascular: No carotid bruit  Cardiovascular:      Rate and Rhythm: Normal rate and regular rhythm  Pulses: Normal pulses  Heart sounds: Normal heart sounds  No murmur heard  No friction rub  No gallop  Pulmonary:      Effort: Pulmonary effort is normal  No respiratory distress  Breath sounds: Normal breath sounds  No stridor  No wheezing, rhonchi or rales  Chest:      Chest wall: No tenderness  Musculoskeletal:         General: Tenderness present  No swelling, deformity or signs of injury  Cervical back: Normal range of motion and neck supple  No rigidity  No muscular tenderness  Right lower leg: No edema  Left lower leg: No edema  Comments: Pain with palpation anterior chest wall right side greater than left  Patient with some discomfort over the left parotid gland/TMJ region  Lymphadenopathy:      Cervical: No cervical adenopathy  Skin:     General: Skin is warm and dry        Capillary Refill: Capillary refill takes less than 2 seconds  Coloration: Skin is not jaundiced  Findings: No bruising, erythema, lesion or rash  Neurological:      General: No focal deficit present  Mental Status: She is alert and oriented to person, place, and time  Cranial Nerves: No cranial nerve deficit  Sensory: No sensory deficit  Motor: No weakness  Coordination: Coordination normal       Gait: Gait normal    Psychiatric:         Mood and Affect: Mood normal          Behavior: Behavior normal          Thought Content:  Thought content normal          Judgment: Judgment normal

## 2023-06-16 ENCOUNTER — EVALUATION (OUTPATIENT)
Dept: PHYSICAL THERAPY | Age: 33
End: 2023-06-16
Payer: COMMERCIAL

## 2023-06-16 ENCOUNTER — TELEMEDICINE (OUTPATIENT)
Dept: BEHAVIORAL/MENTAL HEALTH CLINIC | Facility: CLINIC | Age: 33
End: 2023-06-16
Payer: COMMERCIAL

## 2023-06-16 DIAGNOSIS — F33.1 DEPRESSION, MAJOR, RECURRENT, MODERATE (HCC): Primary | Chronic | ICD-10-CM

## 2023-06-16 DIAGNOSIS — F41.1 GENERALIZED ANXIETY DISORDER: Chronic | ICD-10-CM

## 2023-06-16 DIAGNOSIS — M54.16 LUMBAR RADICULOPATHY: ICD-10-CM

## 2023-06-16 DIAGNOSIS — M79.7 FIBROMYALGIA: ICD-10-CM

## 2023-06-16 DIAGNOSIS — M94.0 COSTOCHONDRITIS: Primary | ICD-10-CM

## 2023-06-16 DIAGNOSIS — M46.90 INFLAMMATORY SPONDYLOPATHY, UNSPECIFIED SPINAL REGION (HCC): ICD-10-CM

## 2023-06-16 PROCEDURE — 97113 AQUATIC THERAPY/EXERCISES: CPT

## 2023-06-16 PROCEDURE — 90834 PSYTX W PT 45 MINUTES: CPT | Performed by: SOCIAL WORKER

## 2023-06-16 NOTE — PROGRESS NOTES
Daily Note     Today's date: 2023  Patient name: Cliff Estrada  : 1990  MRN: [id number]  Referring provider: Jovi Fernandez PA-C  Dx:   Encounter Diagnosis     ICD-10-CM    1  Costochondritis  M94 0       2  Fibromyalgia  M79 7       3  Inflammatory spondylopathy, unspecified spinal region ([facility] [address] )  M46 90       4  Lumbar radiculopathy  M54 16                      Subjective: Pt reports she has been dealing with a flare up recently, has had a lot of pain in her chest, ribs, and heels  Objective: See treatment diary below      Assessment: Tolerated treatment well  Pt demonstrated good understanding of exercise program, had god control during exercises  Continue to progress to tolerance  Pt did not perform squats or pool pony cycling today d/t pain and discomfort today  Patient demonstrated fatigue post treatment, exhibited good technique with therapeutic exercises and would benefit from continued PT      Plan: Continue per plan of care        Manuals  5 5/8 5/10 5/17 5/19 6/2 6/5 6/16                                              HEP: postural correction seated 2 x              HEP lumbar extension against counter 2 x                                                        Ther Ex                            Water walking pre and post  4 laps  4 laps 4x PRE   Only  4x 4x  4x 6x  6x  6x  6x   Seated hamstring stretch:B:  20SEC 5X  20 sec x 5 20sec 3x  20sec 5x  20sec 5x  20sec 5x  20 sec x 5 10sec 5x  20sec 5x  [id number]   LAQ:B:  20x  2 x 10 20x  20x 20x  20x  20x  20x  20x  20x   Hip flexion:B:  20x  2 x 10 20x  20x 20x  20x  20x  20x  20x  20x   Seated postural correction slough over correct  20x  2 x 10 20x  20x 20x  20x  NT 20x  20x  20x   Cervical spine retraction  20x 2sec  2 x 10 20x 2sec  20x 3sec  20x 3sec  20x 3sec  3 sec x 20 15x 2sec  13x 2sec  20x 2sec   UT stretch:B:  20sec 5x  20 sec x 5 20sec 5x  20sec 5x  [id number]  20sec 5x 20 sec x 5 20sec 5x  20sec 5x  20sec 5x   LS stretch:B:  20sec 5x  20 sec x 5 20sec 5x  20sec 5x  20sec 5x  20sec 5x  20 sec x 5 20sec 5x 20sec 5x  20sec 5x                 Standing scapular squeezes  20x 3sec  3 sec x 20 20x 3sec  20x 3sec  20x 3sec  20x 3sec  3 sec x 20 15x 3sec  13x 3sec  10x 3 sec   Standing abdominal contraction with ball push down  NT NT 15x  NT NT NT NT  NT NT   Standing slr x 3:B:  20x  2 x 10 20x  20X  20X  20X  2 x 10 20x  20X  20x   Standing hr and tr:B:  20X  2 x 10 20x  20X 20X  20X  2 x 10 20x  20X  20x                 SLS B  10X 10SEC  10 sec x 10 10x 10sec  10X 1S0EC  10x 10sec  10X 10SEC  10 sec x 10 10sec 10x  10SEC 10X  [id number]   Mini squats  20X 2 x 10 20x  20X  20X 20X  Hold NT 20X np     tandem stance:B:  NT NT NT NT NT NT NT NT 10X 10SEC  10x10 sec   Side stepping   6x  6 x  6X 6X 6X 6X  6X  6X  6X  6x     tandem ambulation   NT NT 6X 6X         Forward step ups:B:  NT NT NT NT NT NT NT NT NT NT   Standing lumbar spine extension  30X  3 x 10 20X  20x  20x  20X  2 x 10 20X  20X  30x   Paddles rows, horizontal add / abd and shoulder add / abd  NT NT NT NT NT NT NT NT NT NT   Pool pony bicycle  5 MIN  5 min 3 MIN  5 MIN  5 MIN  5 MIN  Hold today 8 MIN  NT NT                 Pool pony hang  NT  NT  5 MIN  5 MIN  Hold today reassess D/C   Pain at B UE                                                                           Ther Activity                                          Gait Training                                          Modalities

## 2023-06-16 NOTE — LETTER
2023    [address] [place] [address]    Patient: Becky Encarnacion   YOB: 1990   Date of Visit: 2023     Encounter Diagnosis     ICD-10-CM    1  Costochondritis  M94 0       2  Fibromyalgia  M79 7       3  Inflammatory spondylopathy, unspecified spinal region ([facility] [address] )  M46 90       4  Lumbar radiculopathy  M54 16           Dear Dr Fatmata Collier:    Thank you for your recent referral of Becky Encarnacion  Please review [facility]  Please verify that you agree with the plan of care by signing the attached order  If you have any questions or concerns, please do not hesitate to call  I sincerely appreciate the opportunity to share in the care of one of your patients and hope to have another opportunity to work with you in the near future  Sincerely,    Carin Machuca, PT      Referring Provider:      I certify that I have read the below Plan of Care and certify the need for these services furnished under this plan of treatment while under my care  Naveed Preciado PA-C  [address] [place] [id number]  Via Fax: [phone]          Daily Note     Today's date: 2023  Patient name: Becky Encarnacion  : 1990  MRN: [id number]  Referring provider: Jason Segura PA-C  Dx:   Encounter Diagnosis     ICD-10-CM    1  Costochondritis  M94 0       2  Fibromyalgia  M79 7       3  Inflammatory spondylopathy, unspecified spinal region ([facility] )  M46 90       4  Lumbar radiculopathy  M54 16                      Subjective: Pt reports she has been dealing with a flare up recently, has had a lot of pain in her chest, ribs, and heels  Objective: See treatment diary below      Assessment: Tolerated treatment well  Pt demonstrated good understanding of exercise program, had god control during exercises  Continue to progress to tolerance   Pt did not perform squats or pool pony cycling today d/t pain and discomfort today  Patient demonstrated fatigue post treatment, exhibited good technique with therapeutic exercises and would benefit from continued PT      Plan: Continue per plan of care       Manuals 4/17 4/19 4/24 5/1 5/8 5/10 5/17 5/19 6/2 6/5 6/16                                              HEP: postural correction seated 2 x              HEP lumbar extension against counter 2 x                                                        Ther Ex                            Water walking pre and post  4 laps  4 laps 4x PRE   Only  4x 4x  4x 6x  6x  6x  6x   Seated hamstring stretch:B:  20SEC 5X  20 sec x 5 20sec 3x  20sec 5x  20sec 5x  20sec 5x  20 sec x 5 10sec 5x  20sec 5x  [id number]   LAQ:B:  20x  2 x 10 20x  20x 20x  20x  20x  20x  20x  20x   Hip flexion:B:  20x  2 x 10 20x  20x 20x  20x  20x  20x  20x  20x   Seated postural correction slough over correct  20x  2 x 10 20x  20x 20x  20x  NT 20x  20x  20x   Cervical spine retraction  20x 2sec  2 x 10 20x 2sec  20x 3sec  20x 3sec  20x 3sec  3 sec x 20 15x 2sec  13x 2sec  20x 2sec   UT stretch:B:  20sec 5x  20 sec x 5 20sec 5x  20sec 5x  [id number]  20sec 5x 20 sec x 5 20sec 5x  20sec 5x  20sec 5x   LS stretch:B:  20sec 5x  20 sec x 5 20sec 5x  20sec 5x  20sec 5x  20sec 5x  20 sec x 5 20sec 5x 20sec 5x  20sec 5x                 Standing scapular squeezes  20x 3sec  3 sec x 20 20x 3sec  20x 3sec  20x 3sec  20x 3sec  3 sec x 20 15x 3sec  13x 3sec  10x 3 sec   Standing abdominal contraction with ball push down  NT NT 15x  NT NT NT NT  NT NT   Standing slr x 3:B:  20x  2 x 10 20x  20X  20X  20X  2 x 10 20x  20X  20x   Standing hr and tr:B:  20X  2 x 10 20x  20X 20X  20X  2 x 10 20x  20X  20x                 SLS B  10X 10SEC  10 sec x 10 10x 10sec  10X 1S0EC  10x 10sec  10X 10SEC  10 sec x 10 10sec 10x  10SEC 10X  [id number]   Mini squats  20X 2 x 10 20x  20X  20X 20X  Hold NT 20X np     tandem stance:B:  NT NT NT NT NT NT NT NT 10X 10SEC 10x10 sec   Side stepping   6x  6 x  6X 6X 6X 6X  6X  6X  6X  6x     tandem ambulation   NT NT 6X 6X         Forward step ups:B:  NT NT NT NT NT NT NT NT NT NT   Standing lumbar spine extension  30X  3 x 10 20X  20x  20x  20X  2 x 10 20X  20X  30x   Paddles rows, horizontal add / abd and shoulder add / abd  NT NT NT NT NT NT NT NT NT NT   Pool pony bicycle  5 MIN  5 min 3 MIN  5 MIN  5 MIN  5 MIN  Hold today 8 MIN  NT NT                 Pool pony hang  NT  NT  5 MIN  5 MIN  Hold today reassess D/C   Pain at B UE                                                                           Ther Activity                                          Gait Training                                          Modalities                                                PT Evaluation  / PT Reassessment    Today's date: 2023  Patient name: Kofi Locke  : 1990  MRN: [id number]  Referring provider: Kelvin Cai PA-C  Dx:   Encounter Diagnosis     ICD-10-CM    1  Costochondritis  M94 0       2  Fibromyalgia  M79 7       3  Inflammatory spondylopathy, unspecified spinal region ([facility] [address] )  M46 90       4  Lumbar radiculopathy  M54 16           Start Time: 0830  Stop Time: 3353  Total time in clinic (min): 55 minutes    Assessment  Assessment details: PT Reassessment: 23  Patient reported the following improvement since onset of Pool based PT: decrease in pain, increase in mobility, dressing activities, self ialds and self functional activities  But, she noted the following deficits that still persists: reaching in all directions, bilateral posterior and plantar calcaneal region pain, pain in lumbar spine and chest, lifting, bilateral upper extremity weakness and fatigue with house hold chores like cleaning, sleep deprived  Patient noted she has been in a full body pain flair up over the past two weeks  PT IE: 2023  Patient reported she has FM, which has muscle weakness and pain    Patient noted she has truck pain due to AS and costochondritis  Patient noted she has soreness in left calcaneus and achilles region  Patient noted she is fatigued as well  Patient noted she has pain through out her back, but she noted cervical and lumbar spine pain limits her functionally more than lumbar spine  Patient noted bilateral upper and lower extremity muscle weakness and fatigue persists with all self and house hold activities  Patient noted the following deficits due to lumbar, thoracic and cervical spine pain: prolonged standing during house hold chores like washing the dishes, vacuuming and sweeping, bending, lifting heavier items, carrying activities, reaching, using the cell phone for prolonged periods of time, sleep deprived with patient reported she sleeps too much or not at all, walking at only one block and then fatigued and repeated stair climbing  Patient noted intermittent bouts of bilateral upper or lower extremity paresthesias  Patient noted she does have TMJ issues as well and thus has jaw pain  Patient noted she is not currently working  Patient denies recent diagnostic testing for th spine regions  Impairments: abnormal gait, abnormal or restricted ROM, abnormal movement, activity intolerance, impaired physical strength, lacks appropriate home exercise program and pain with function  Understanding of Dx/Px/POC: excellent   Prognosis: good  Prognosis details: Patient is a 33y o  year old female seen for outpatient PT evaluation with pain, mobility and functional deficits due to FM, inflammatory spondylopathy, arthralgia, costochondritis and lumbar radiculopathy  Patient presents with the following progress since onset of PT: decrease in pain, increase in bilateral upper and lower extremity strength, increase in lumbar and cervical spine mobility, gait and transfer improvements and functional progress    Patient presents to PT reassessment with the following problems, concerns, deficits and impairments: lumbar, thoracic and cervical spine pain, decreased lumbar and cervical spine range of motion, decreased bilateral upper and lower extremity mobility and strength, decrease in postural awareness, + TTP, + muscle guarding, functional limitations and decreased tolerance to activity  Patient would benefit from skilled PT services under the following PT treatment plan to address the above noted deficits: therapeutic exercises and activities to facilitate lumbar and cervical spine mobility and bilateral upper and lower extremity strength, abdominal strengthening and DLS activities, postural reeducation and strengthening, modalities, manual therapy techniques, IASTM techniques, Kinesio taping techniques and a hep  Thank you for the referral      Goals  Short Term goals 4 - 6 weeks  1  Patient will be independent HEP  MET  2   Patient will report a 25 - 50% decrease in pain complaints  MET  3   Increase strength 1/2 grade  MET  4   Increase ROM 5-10 degrees  MET  Long Term goals 8 - 12 weeks  1  Patient will report elimination of pain complaints  Partially MET  2   Patient will return to all recreational activities without restriction  Partially MET  3   ROM WFL  Partially MET  4   Strength 5/5  Partially MET  5   Patient will report walking improved by > 10 minutes prior to sitting to rest due to cervical, thoracic, lumbar, bilateral upper and lower extremity symptoms or limitations  Partially MET  6   Patient will report ability to perform two flights of steps without cervical, thoracic, lumbar, bilateral upper and lower extremity symptoms or limitations  Partially MET  7   Patient will report house hold chores involving static standing improved by > 10 minutes prior to cervical, thoracic, lumbar, bilateral upper and lower extremity symptoms or limitations  Partially MET    8   Patient will report ability to bend and lift during house hold activities without cervical, thoracic, lumbar, bilateral upper and lower extremity symptoms or limitations  Partially MET  9   Patient will report ability to reach during self and house hold iadls without cervical, thoracic, lumbar, bilateral upper and lower extremity symptoms or limitations  Partially MET  Plan  Patient would benefit from: skilled physical therapy  Planned modality interventions: cryotherapy, TENS, thermotherapy: hydrocollator packs, traction, ultrasound and unattended electrical stimulation  Planned therapy interventions: joint mobilization, manual therapy, massage, aquatic therapy, balance, balance/weight bearing training, body mechanics training, muscle pump exercises, neuromuscular re-education, patient education, postural training, self care, compression, strengthening, stretching, therapeutic activities, therapeutic exercise, therapeutic training, flexibility, functional ROM exercises, gait training, graded activity, graded exercise, graded motor and home exercise program  Frequency: 2x week  Duration in weeks: 12  Treatment plan discussed with: patient        Subjective Evaluation    Pain  At best pain ratin  At worst pain ratin  Location: lumbar, thoracic and cervical spine    Patient Goals  Patient goals for therapy: decreased pain, increased motion, increased strength, independence with ADLs/IADLs and return to sport/leisure activities  Patient goal: Patient's goal is to get stronger  Objective     Tenderness     Additional Tenderness Details  Patient is + moderate TTP and severe muscle guarding at thoracic and cervical spine paraspinal musculature and bilateral upper trapezius muscle region  Patient exhibits protracted cervical spine at moderate levels  Patient is + moderate TTP and muscle guarding at lumbar spine erector spinae musculature      Active Range of Motion   Cervical/Thoracic Spine       Cervical    Flexion: 25 degrees   Extension: 30 degrees      Left lateral flexion: 26 degrees      Right lateral flexion: 22 degrees      Left rotation: 52 degrees  Right rotation: 50 degrees         Left Shoulder   Flexion: 160 degrees   Abduction: 154 degrees   External rotation 45°: 60 degrees   Internal rotation 45°: 58 degrees     Right Shoulder   Flexion: 165 degrees   Abduction: 158 degrees   External rotation 45°: 64 degrees   Internal rotation 45°: 62 degrees     Lumbar   Flexion: 88 degrees   Extension: 26 degrees   Left lateral flexion: 20 degrees       Right lateral flexion: 16 degrees   Left rotation: 65 degrees   Right rotation: 58 degrees   Left Hip   Flexion: 104 degrees   Abduction: 16 degrees     Right Hip   Flexion: 106 degrees   Abduction: 24 degrees   Left Knee   Flexion: 104 degrees   Extension: -5 degrees   Extensor la degrees     Right Knee   Flexion: 110 degrees   Extension: -6 degrees   Extensor la degrees with pain  Left Ankle/Foot   Dorsiflexion (ke): 6 degrees   Plantar flexion: 48 degrees     Right Ankle/Foot   Dorsiflexion (ke): 4 degrees   Plantar flexion: 46 degrees     Strength/Myotome Testing     Left Shoulder     Planes of Motion   Flexion: 4-   Abduction: 4-   External rotation at 0°: 4-   Internal rotation at 0°: 4     Right Shoulder     Planes of Motion   Flexion: 4-   Abduction: 4-   External rotation at 0°: 4-   Internal rotation at 0°: 4     Left Hip   Planes of Motion   Flexion: 4  Extension: 4  Abduction: 4  Adduction: 4+    Right Hip   Planes of Motion   Flexion: 4  Extension: 4  Abduction: 4  Adduction: 4+    Left Knee   Flexion: 4  Extension: 4    Right Knee   Flexion: 4  Extension: 4-    Left Ankle/Foot   Dorsiflexion: 4+  Plantar flexion: 5    Right Ankle/Foot   Dorsiflexion: 4  Plantar flexion: 4+            Precautions:     Access Code: [email or web address]  URL: [email or web address]  Date: 2023  Prepared by: Philippe Hoover Due    Exercises  - Correct Seated Posture  - 3 x daily - 7 x weekly - 2 sets - 10 reps  - Standing Lumbar Extension with Counter  - 3 x daily - 7 x weekly - 2 sets - 10 reps      Manuals 4/17                                                                Neuro Re-Ed                                       HEP: postural correction seated 2 x             HEP lumbar extension against counter 2 x                                                    Ther Ex                          Water walking pre and post             Seated hamstring stretch:B:             LAQ:B:             Hip flexion:B:             Seated postural correction slough over correct             Cervical spine retraction             UT stretch:B:             LS stretch:B:                          Standing scapular squeezes             Standing abdominal contraction with ball push down             Standing slr x 3:B:             Standing hr and tr:B:             Mini squats              SLS and tandem stance:B:             Side stepping and tandem ambulation             Forward step ups:B:             Standing lumbar spine extension             Paddles rows, horizontal add / abd and shoulder add / abd             Pool pony bicycle                          Pool pony hang                                                                              Ther Activity                                       Gait Training                                       Modalities

## 2023-06-16 NOTE — PROGRESS NOTES
PT Evaluation  / PT Reassessment    Today's date: 2023  Patient name: Avinash Helm  : 1990  MRN: [id number]  Referring provider: Karson Duenas PA-C  Dx:   Encounter Diagnosis     ICD-10-CM    1  Costochondritis  M94 0       2  Fibromyalgia  M79 7       3  Inflammatory spondylopathy, unspecified spinal region ([facility] [address] )  M46 90       4  Lumbar radiculopathy  M54 16           Start Time: 830  Stop Time: 79  Total time in clinic (min): 55 minutes    Assessment  Assessment details: PT Reassessment: 23  Patient reported the following improvement since onset of Pool based PT: decrease in pain, increase in mobility, dressing activities, self ialds and self functional activities  But, she noted the following deficits that still persists: reaching in all directions, bilateral posterior and plantar calcaneal region pain, pain in lumbar spine and chest, lifting, bilateral upper extremity weakness and fatigue with house hold chores like cleaning, sleep deprived  Patient noted she has been in a full body pain flair up over the past two weeks  PT IE: 2023  Patient reported she has FM, which has muscle weakness and pain  Patient noted she has truck pain due to AS and costochondritis  Patient noted she has soreness in left calcaneus and achilles region  Patient noted she is fatigued as well  Patient noted she has pain through out her back, but she noted cervical and lumbar spine pain limits her functionally more than lumbar spine  Patient noted bilateral upper and lower extremity muscle weakness and fatigue persists with all self and house hold activities    Patient noted the following deficits due to lumbar, thoracic and cervical spine pain: prolonged standing during house hold chores like washing the dishes, vacuuming and sweeping, bending, lifting heavier items, carrying activities, reaching, using the cell phone for prolonged periods of time, sleep deprived with patient reported she sleeps too much or not at all, walking at only one block and then fatigued and repeated stair climbing  Patient noted intermittent bouts of bilateral upper or lower extremity paresthesias  Patient noted she does have TMJ issues as well and thus has jaw pain  Patient noted she is not currently working  Patient denies recent diagnostic testing for th spine regions  Impairments: abnormal gait, abnormal or restricted ROM, abnormal movement, activity intolerance, impaired physical strength, lacks appropriate home exercise program and pain with function  Understanding of Dx/Px/POC: excellent   Prognosis: good  Prognosis details: Patient is a 33y o  year old female seen for outpatient PT evaluation with pain, mobility and functional deficits due to FM, inflammatory spondylopathy, arthralgia, costochondritis and lumbar radiculopathy  Patient presents with the following progress since onset of PT: decrease in pain, increase in bilateral upper and lower extremity strength, increase in lumbar and cervical spine mobility, gait and transfer improvements and functional progress  Patient presents to PT reassessment with the following problems, concerns, deficits and impairments: lumbar, thoracic and cervical spine pain, decreased lumbar and cervical spine range of motion, decreased bilateral upper and lower extremity mobility and strength, decrease in postural awareness, + TTP, + muscle guarding, functional limitations and decreased tolerance to activity  Patient would benefit from skilled PT services under the following PT treatment plan to address the above noted deficits: therapeutic exercises and activities to facilitate lumbar and cervical spine mobility and bilateral upper and lower extremity strength, abdominal strengthening and DLS activities, postural reeducation and strengthening, modalities, manual therapy techniques, IASTM techniques, Kinesio taping techniques and a hep    Thank you for the referral      Goals  Short Term goals 4 - 6 weeks  1  Patient will be independent HEP  MET  2   Patient will report a 25 - 50% decrease in pain complaints  MET  3   Increase strength 1/2 grade  MET  4   Increase ROM 5-10 degrees  MET  Long Term goals 8 - 12 weeks  1  Patient will report elimination of pain complaints  Partially MET  2   Patient will return to all recreational activities without restriction  Partially MET  3   ROM WFL  Partially MET  4   Strength 5/5  Partially MET  5   Patient will report walking improved by > 10 minutes prior to sitting to rest due to cervical, thoracic, lumbar, bilateral upper and lower extremity symptoms or limitations  Partially MET  6   Patient will report ability to perform two flights of steps without cervical, thoracic, lumbar, bilateral upper and lower extremity symptoms or limitations  Partially MET  7   Patient will report house hold chores involving static standing improved by > 10 minutes prior to cervical, thoracic, lumbar, bilateral upper and lower extremity symptoms or limitations  Partially MET  8   Patient will report ability to bend and lift during house hold activities without cervical, thoracic, lumbar, bilateral upper and lower extremity symptoms or limitations  Partially MET  9   Patient will report ability to reach during self and house hold iadls without cervical, thoracic, lumbar, bilateral upper and lower extremity symptoms or limitations  Partially MET      Plan  Patient would benefit from: skilled physical therapy  Planned modality interventions: cryotherapy, TENS, thermotherapy: hydrocollator packs, traction, ultrasound and unattended electrical stimulation  Planned therapy interventions: joint mobilization, manual therapy, massage, aquatic therapy, balance, balance/weight bearing training, body mechanics training, muscle pump exercises, neuromuscular re-education, patient education, postural training, self care, compression, strengthening, stretching, therapeutic activities, therapeutic exercise, therapeutic training, flexibility, functional ROM exercises, gait training, graded activity, graded exercise, graded motor and home exercise program  Frequency: 2x week  Duration in weeks: 12  Treatment plan discussed with: patient        Subjective Evaluation    Pain  At best pain ratin  At worst pain ratin  Location: lumbar, thoracic and cervical spine    Patient Goals  Patient goals for therapy: decreased pain, increased motion, increased strength, independence with ADLs/IADLs and return to sport/leisure activities  Patient goal: Patient's goal is to get stronger  Objective     Tenderness     Additional Tenderness Details  Patient is + moderate TTP and severe muscle guarding at thoracic and cervical spine paraspinal musculature and bilateral upper trapezius muscle region  Patient exhibits protracted cervical spine at moderate levels  Patient is + moderate TTP and muscle guarding at lumbar spine erector spinae musculature      Active Range of Motion   Cervical/Thoracic Spine       Cervical    Flexion: 25 degrees   Extension: 30 degrees      Left lateral flexion: 26 degrees      Right lateral flexion: 22 degrees      Left rotation: 52 degrees  Right rotation: 50 degrees         Left Shoulder   Flexion: 160 degrees   Abduction: 154 degrees   External rotation 45°: 60 degrees   Internal rotation 45°: 58 degrees     Right Shoulder   Flexion: 165 degrees   Abduction: 158 degrees   External rotation 45°: 64 degrees   Internal rotation 45°: 62 degrees     Lumbar   Flexion: 88 degrees   Extension: 26 degrees   Left lateral flexion: 20 degrees       Right lateral flexion: 16 degrees   Left rotation: 65 degrees   Right rotation: 58 degrees   Left Hip   Flexion: 104 degrees   Abduction: 16 degrees     Right Hip   Flexion: 106 degrees   Abduction: 24 degrees   Left Knee   Flexion: 104 degrees   Extension: -5 degrees   Extensor la degrees     Right Knee Flexion: 110 degrees   Extension: -6 degrees   Extensor la degrees with pain  Left Ankle/Foot   Dorsiflexion (ke): 6 degrees   Plantar flexion: 48 degrees     Right Ankle/Foot   Dorsiflexion (ke): 4 degrees   Plantar flexion: 46 degrees     Strength/Myotome Testing     Left Shoulder     Planes of Motion   Flexion: 4-   Abduction: 4-   External rotation at 0°: 4-   Internal rotation at 0°: 4     Right Shoulder     Planes of Motion   Flexion: 4-   Abduction: 4-   External rotation at 0°: 4-   Internal rotation at 0°: 4     Left Hip   Planes of Motion   Flexion: 4  Extension: 4  Abduction: 4  Adduction: 4+    Right Hip   Planes of Motion   Flexion: 4  Extension: 4  Abduction: 4  Adduction: 4+    Left Knee   Flexion: 4  Extension: 4    Right Knee   Flexion: 4  Extension: 4-    Left Ankle/Foot   Dorsiflexion: 4+  Plantar flexion: 5    Right Ankle/Foot   Dorsiflexion: 4  Plantar flexion: 4+             Precautions:     Access Code: [email or web address]  URL: [email or web address]  Date: 2023  Prepared by: Honey Santos Due    Exercises  - Correct Seated Posture  - 3 x daily - 7 x weekly - 2 sets - 10 reps  - Standing Lumbar Extension with Counter  - 3 x daily - 7 x weekly - 2 sets - 10 reps      Manuals                                                                 Neuro Re-Ed                                       HEP: postural correction seated 2 x             HEP lumbar extension against counter 2 x                                                    Ther Ex                          Water walking pre and post             Seated hamstring stretch:B:             LAQ:B:             Hip flexion:B:             Seated postural correction slough over correct             Cervical spine retraction             UT stretch:B:             LS stretch:B:                          Standing scapular squeezes             Standing abdominal contraction with ball push down             Standing slr x 3:B:             Standing hr and tr:B:             Mini squats              SLS and tandem stance:B:             Side stepping and tandem ambulation             Forward step ups:B:             Standing lumbar spine extension             Paddles rows, horizontal add / abd and shoulder add / abd             Pool pony bicycle                          Pool pony hang                                                                              Ther Activity                                       Gait Training                                       Modalities

## 2023-06-16 NOTE — PSYCH
Virtual Regular Visit    Verification of patient location:    Patient is located at Home in the following state in which I hold an active license [place]    Assessment/Plan:    Problem List Items Addressed This Visit        Other    Generalized anxiety disorder (Chronic)    Depression, major, recurrent, moderate ([address] ) - Primary (Chronic)     Goals addressed in session: Goal 1      Reason for visit is   Chief Complaint   Patient presents with   • Virtual Regular Visit        Encounter provider APARNA De Jesus    Provider located at [address] [place] [address] [id number] [phone]      Recent Visits  Date Type Provider Dept   06/23/23 [address], [address] [facility]   Showing recent visits within past 7 days and meeting all other requirements  Future Appointments  No visits were found meeting these conditions  Showing future appointments within next 150 days and meeting all other requirements       The patient was identified by name and date of birth  Dominick Galvan was informed that this is a telemedicine visit and that the visit is being conducted thr[facility]  She agrees to proceed     My office door was closed  No one else was in the room  She acknowledged consent and understanding of privacy and security of the video platform  The patient has agreed to participate and understands they can discontinue the visit at any time  Patient is aware this is a billable service  Jase Renteria is a 33 y o  female        HPI     Past Medical History:   Diagnosis Date   • Abnormal Pap smear of cervix    • Allergic    • Anxiety    • Arthritis    • Depression    • Diabetes mellitus ([address] )    • Fibromyalgia, primary    • Hypertension    • IBS (irritable bowel syndrome)    • Migraine    • Obesity    • Vitamin B12 deficiency        Past Surgical History:   Procedure Laterality Date   • COLONOSCOPY N/A 5/8/2018    Procedure: COLONOSCOPY;  Surgeon: Hany Lala MD;  Location: [facility]; Service: Gastroenterology   • GA EXC B9 LESION MRGN XCP SK TG S/N/H/F/G > 4 0CM N/A 7/1/2021    Procedure: EXCISION OF PILAR SCALP CYST X 2 AND RIGHT INNER GROIN NEVVUS;  Surgeon: Eli Almanzar MD;  Location: [address] [place] [address];  Service: Plastics   • GA REPAIR COMPLEX SCALP/ARM/LEG 2 6-7 5 CM N/A 7/1/2021    Procedure: CLOSURE WOUND SCALP X 2 AND RIGHT INNER GROIN;  Surgeon: Eli Almanzar MD;  Location: [address] [place] [address];  Service: Plastics   • TONSILLECTOMY     • WISDOM TOOTH EXTRACTION         Current Outpatient Medications   Medication Sig Dispense Refill   • Aimovig 140 MG/ML SOAJ Inject 140mg (1 pen) under the skin every 30 days  1 mL 11   • ALPRAZolam (XANAX) 0 5 mg tablet Take 1 tablet (0 5 mg total) by mouth 2 (two) times a day as needed for anxiety 60 tablet 2   • buPROPion (WELLBUTRIN XL) 300 mg 24 hr tablet Take 1 tablet (300 mg total) by mouth daily 90 tablet 0   • Cholecalciferol (Vitamin D3) 50 MCG (2000 UT) capsule Take 2,000 Units by mouth daily     • cyanocobalamin 1,000 mcg/mL 1 IM injection every week x 4 weeks, then 1 every month 4 mL 2   • diazepam (VALIUM) 5 mg tablet Take 1 tablet (5 mg total) by mouth daily at bedtime as needed for sleep or muscle spasms 30 tablet 1   • dicyclomine (BENTYL) 20 mg tablet Take 1 tablet (20 mg total) by mouth 2 (two) times a day for 7 days 14 tablet 0   • escitalopram (LEXAPRO) 20 mg tablet Take 1 tablet (20 mg total) by mouth daily 90 tablet 0   • etonogestrel-ethinyl estradiol (NuvaRing) 0 12-0 015 MG/24HR vaginal ring Insert ring for 21 days then remove for one week  3 each 4   • FeroSul 325 (65 Fe) MG tablet Take 1 tablet by mouth in the morning     • indomethacin (INDOCIN) 25 mg capsule 1-2 tabs BID prn severe headache with food  Hold toradol   30 capsule 0   • magnesium Oxide (MAG-OX) 400 mg TABS Take 1 tablet (400 mg "total) by mouth daily 30 tablet 0   • methylPREDNISolone 4 MG tablet therapy pack Use as directed on package 21 tablet 0   • OLANZapine (ZyPREXA) 2 5 mg tablet Take 1 tablet (2 5 mg total) by mouth daily Do not take with reglan  30 tablet 2   • onabotulinumtoxin A (BOTOX) 100 units Inject as directed     • ondansetron (ZOFRAN-ODT) 4 mg disintegrating tablet Take 1 tablet (4 mg total) by mouth every 6 (six) hours as needed for nausea or vomiting 90 tablet 0   • pantoprazole (PROTONIX) 40 mg tablet Take 1 tablet (40 mg total) by mouth daily 30 tablet 3   • pramipexole (MIRAPEX) 0 25 mg tablet Take 1 tablet (0 25 mg total) by mouth daily at bedtime 90 tablet 1   • prazosin (MINIPRESS) 2 mg capsule Take 1 capsule (2 mg total) by mouth daily at bedtime 30 capsule 2   • predniSONE 5 mg tablet      • Syringe/Needle, Disp, (SYRINGE 3CC/38HD8-0/2\") 25G X 1-1/2\" 3 ML MISC Use for B12 IM injections  4 each 0   • Trudhesa 0 725 MG/ACT AERS 1 spray in each nostril for total dose of 1 45mg, may repeat 1 dose after 1 hour  Max 2 doses per 24 hours and 3 doses per week   12 mL 6     Current Facility-Administered Medications   Medication Dose Route Frequency Provider Last Rate Last Admin   • cyanocobalamin injection 1,000 mcg  1,000 mcg Intramuscular Q30 Days Javier Fine, DO   1,000 mcg at 08/03/20 3273   • cyanocobalamin injection 1,000 mcg  1,000 mcg Intramuscular Q14 Days Javier Fine, DO   1,000 mcg at 05/18/20 1146   • cyanocobalamin injection 1,000 mcg  1,000 mcg Intramuscular Q30 Days Javier Fine, DO   1,000 mcg at 09/01/20 1129        Allergies   Allergen Reactions   • Cimzia [Certolizumab Pegol] Swelling   • Desvenlafaxine      Other reaction(s): state of confusion   • Ixekizumab Vomiting   • Valproic Acid Other (See Comments)     Other reaction(s): dilated pupils, \"schizi\"   • Voltaren [Diclofenac] Swelling   • Tizanidine Anxiety       Review of Systems    Video Exam    There were no vitals filed for this " "visit  Physical Exam     Behavioral Health Psychotherapy Progress Note    Psychotherapy Provided: Individual Psychotherapy     1  Depression, major, recurrent, moderate ([address] )        2  Generalized anxiety disorder            Goals addressed in session: Goal 1     DATA: Met with Stephanie for scheduled individual session  She discussed her frustration with her best friend  She states that she feels that her friend is not as supportive of her as she used to be  She states that she was talking with her \"ex-best-friend\" and was able to share with her about her medical condition  We discussed the nature of friendships and Stephanie's tendency to focus on one primary friend relationship for the majority of her support and socialization  We discussed the possibility of her forming and maintaining more relationships-- to get more support from various people  We discussed her relationships with other friends and who she feels may be able to support her in different ways  Rolette Lv states that she has some friends who she feels have very healthy relationships and overall lifestyles  She states that she has not spent very much time with them, although she really enjoys their company  Amaya Awan states that 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  She plans to talk to them about potentially spending some time together  During this session, this clinician used the following therapeutic modalities: Client-centered Therapy, Dialectical Behavior Therapy, Mindfulness-based Strategies, Motivational Interviewing, Solution-Focused Therapy and Supportive Psychotherapy    Substance Abuse was not addressed during this session  If the client is diagnosed with a co-occurring substance use disorder, please indicate any changes in the frequency or amount of use: n/a  Stage of change for addressing substance use diagnoses: No substance use/Not applicable    ASSESSMENT:  Sailaja De La Rosa presents with a Euthymic/ normal mood       her affect is " "Normal range and intensity, which is congruent, with her mood and the content of the session  The client has made progress on their goals  Lauren Crowder presents with a minimal risk of suicide, minimal risk of self-harm, and minimal risk of harm to others  For any risk assessment that surpasses a \"low\" rating, a safety plan must be developed  A safety plan was indicated: no  If yes, describe in detail n/a    PLAN: Between sessions, Lauren Crowder will work on identifying how different friends can provide her with different types of support  At the next session, the therapist will use Client-centered Therapy, Dialectical Behavior Therapy, Mindfulness-based Strategies, Motivational Interviewing, Solution-Focused Therapy and Supportive Psychotherapy to address her mood regulation and relationship concerns  Behavioral Health Treatment Plan and Discharge Planning: Lauren Crowder is aware of and agrees to continue to work on their treatment plan  They have identified and are working toward their discharge goals   yes    Visit start and stop times:    06/16/23  Start Time: 1503  Stop Time: 1549  Total Visit Time: 46 minutes      "

## 2023-06-19 DIAGNOSIS — R11.2 NAUSEA & VOMITING: ICD-10-CM

## 2023-06-19 RX ORDER — PANTOPRAZOLE SODIUM 40 MG/1
40 TABLET, DELAYED RELEASE ORAL DAILY
Qty: 30 TABLET | Refills: 3 | Status: SHIPPED | OUTPATIENT
Start: 2023-06-19

## 2023-06-23 ENCOUNTER — TELEMEDICINE (OUTPATIENT)
Dept: BEHAVIORAL/MENTAL HEALTH CLINIC | Facility: CLINIC | Age: 33
End: 2023-06-23
Payer: COMMERCIAL

## 2023-06-23 DIAGNOSIS — F33.1 DEPRESSION, MAJOR, RECURRENT, MODERATE (HCC): Primary | Chronic | ICD-10-CM

## 2023-06-23 DIAGNOSIS — F41.1 GENERALIZED ANXIETY DISORDER: Chronic | ICD-10-CM

## 2023-06-23 PROCEDURE — 90834 PSYTX W PT 45 MINUTES: CPT | Performed by: SOCIAL WORKER

## 2023-06-23 NOTE — PSYCH
Virtual Regular Visit    Verification of patient location:    Patient is located at Home in the following state in which I hold an active license [place]    Assessment/Plan:    Problem List Items Addressed This Visit        Other    Generalized anxiety disorder (Chronic)    Depression, major, recurrent, moderate ([address] ) - Primary (Chronic)       Goals addressed in session: Goal 1          Reason for visit is   Chief Complaint   Patient presents with   • Virtual Regular Visit        Encounter provider APARNA Pradhan    Provider located at [address] [place] [id number] [phone]      Recent Visits  Date Type Provider Dept   06/23/23 [address], [address] [facility]   Showing recent visits within past 7 days and meeting all other requirements  Future Appointments  No visits were found meeting these conditions  Showing future appointments within next 150 days and meeting all other requirements       The patient was identified by name and date of birth  Mallorybita Rangel was informed that this is a telemedicine visit and that the visit is being conducted throught[facility]  She agrees to proceed     My office door was closed  No one else was in the room  She acknowledged consent and understanding of privacy and security of the video platform  The patient has agreed to participate and understands they can discontinue the visit at any time  Patient is aware this is a billable service  Al Gomez is a 33 y o  female        HPI     Past Medical History:   Diagnosis Date   • Abnormal Pap smear of cervix    • Allergic    • Anxiety    • Arthritis    • Depression    • Diabetes mellitus ([address] )    • Fibromyalgia, primary    • Hypertension    • IBS (irritable bowel syndrome)    • Migraine    • Obesity    • Vitamin B12 deficiency        Past Surgical History:   Procedure Laterality Date   • COLONOSCOPY N/A 5/8/2018    Procedure: COLONOSCOPY;  Surgeon: Hany Lala MD;  Location: [facility]; Service: Gastroenterology   • NE EXC B9 LESION MRGN XCP SK TG S/N/H/F/G > 4 0CM N/A 7/1/2021    Procedure: EXCISION OF PILAR SCALP CYST X 2 AND RIGHT INNER GROIN NEVVUS;  Surgeon: Eli Almanzar MD;  Location: [address] [place] [address];  Service: Plastics   • NE REPAIR COMPLEX SCALP/ARM/LEG 2 6-7 5 CM N/A 7/1/2021    Procedure: CLOSURE WOUND SCALP X 2 AND RIGHT INNER GROIN;  Surgeon: Eli Almanzar MD;  Location: [address] [place] [address];  Service: Plastics   • TONSILLECTOMY     • WISDOM TOOTH EXTRACTION         Current Outpatient Medications   Medication Sig Dispense Refill   • Aimovig 140 MG/ML SOAJ Inject 140mg (1 pen) under the skin every 30 days  1 mL 11   • ALPRAZolam (XANAX) 0 5 mg tablet Take 1 tablet (0 5 mg total) by mouth 2 (two) times a day as needed for anxiety 60 tablet 2   • buPROPion (WELLBUTRIN XL) 300 mg 24 hr tablet Take 1 tablet (300 mg total) by mouth daily 90 tablet 0   • Cholecalciferol (Vitamin D3) 50 MCG (2000 UT) capsule Take 2,000 Units by mouth daily     • cyanocobalamin 1,000 mcg/mL 1 IM injection every week x 4 weeks, then 1 every month 4 mL 2   • diazepam (VALIUM) 5 mg tablet Take 1 tablet (5 mg total) by mouth daily at bedtime as needed for sleep or muscle spasms 30 tablet 1   • dicyclomine (BENTYL) 20 mg tablet Take 1 tablet (20 mg total) by mouth 2 (two) times a day for 7 days 14 tablet 0   • escitalopram (LEXAPRO) 20 mg tablet Take 1 tablet (20 mg total) by mouth daily 90 tablet 0   • etonogestrel-ethinyl estradiol (NuvaRing) 0 12-0 015 MG/24HR vaginal ring Insert ring for 21 days then remove for one week  3 each 4   • FeroSul 325 (65 Fe) MG tablet Take 1 tablet by mouth in the morning     • indomethacin (INDOCIN) 25 mg capsule 1-2 tabs BID prn severe headache with food  Hold toradol   30 capsule 0   • magnesium Oxide (MAG-OX) 400 mg TABS Take 1 tablet "(400 mg total) by mouth daily 30 tablet 0   • methylPREDNISolone 4 MG tablet therapy pack Use as directed on package 21 tablet 0   • OLANZapine (ZyPREXA) 2 5 mg tablet Take 1 tablet (2 5 mg total) by mouth daily Do not take with reglan  30 tablet 2   • onabotulinumtoxin A (BOTOX) 100 units Inject as directed     • ondansetron (ZOFRAN-ODT) 4 mg disintegrating tablet Take 1 tablet (4 mg total) by mouth every 6 (six) hours as needed for nausea or vomiting 90 tablet 0   • pantoprazole (PROTONIX) 40 mg tablet Take 1 tablet (40 mg total) by mouth daily 30 tablet 3   • pramipexole (MIRAPEX) 0 25 mg tablet Take 1 tablet (0 25 mg total) by mouth daily at bedtime 90 tablet 1   • prazosin (MINIPRESS) 2 mg capsule Take 1 capsule (2 mg total) by mouth daily at bedtime 30 capsule 2   • predniSONE 5 mg tablet      • Syringe/Needle, Disp, (SYRINGE 3CC/14RO6-4/2\") 25G X 1-1/2\" 3 ML MISC Use for B12 IM injections  4 each 0   • Trudhesa 0 725 MG/ACT AERS 1 spray in each nostril for total dose of 1 45mg, may repeat 1 dose after 1 hour  Max 2 doses per 24 hours and 3 doses per week   12 mL 6     Current Facility-Administered Medications   Medication Dose Route Frequency Provider Last Rate Last Admin   • cyanocobalamin injection 1,000 mcg  1,000 mcg Intramuscular Q30 Days Gail Lather, DO   1,000 mcg at 08/03/20 2325   • cyanocobalamin injection 1,000 mcg  1,000 mcg Intramuscular Q14 Days Gail Lather, DO   1,000 mcg at 05/18/20 1146   • cyanocobalamin injection 1,000 mcg  1,000 mcg Intramuscular Q30 Days Gail Lather, DO   1,000 mcg at 09/01/20 1129        Allergies   Allergen Reactions   • Cimzia [Certolizumab Pegol] Swelling   • Desvenlafaxine      Other reaction(s): state of confusion   • Ixekizumab Vomiting   • Valproic Acid Other (See Comments)     Other reaction(s): dilated pupils, \"schizi\"   • Voltaren [Diclofenac] Swelling   • Tizanidine Anxiety       Review of Systems    Video Exam    There were no vitals filed for this " "visit  Physical Exam     Behavioral Health Psychotherapy Progress Note    Psychotherapy Provided: Individual Psychotherapy     1  Depression, major, recurrent, moderate ([address] )        2  Generalized anxiety disorder          Goals addressed in session: Goal 1     DATA: Met with Stephanie for her scheduled individual session  She states, \"I'm still in a flare  My mom thinks it's because of the weather  \" We discussed how she manages her physical health when the weather is not conducive to her going out  She reports that she often sleeps  We discussed whether or not this is helpful or if it increases her physical discomfort  Aaliyah Castro continues to attend aquatherapy, and she states that she had her re-evaluation and has made some improvements  We discussed the importance of making sure she uses some movement and/or stretching when she is in a flare and can't leave her home  Aaliyah Castro discussed her relationship with her best friend  We briefly discussed the conversation we had last week re: increasing her time spent with other friends  She did dog-sit for her other friends and continues to plan to ask them to [place] out\" some time  During this session, this clinician used the following therapeutic modalities: Client-centered Therapy, Dialectical Behavior Therapy, Mindfulness-based Strategies, Motivational Interviewing, Solution-Focused Therapy and Supportive Psychotherapy    Substance Abuse was not addressed during this session  If the client is diagnosed with a co-occurring substance use disorder, please indicate any changes in the frequency or amount of use: n/a  Stage of change for addressing substance use diagnoses: No substance use/Not applicable    ASSESSMENT:  Maxime Israel presents with a Euthymic/ normal mood  her affect is Normal range and intensity, which is congruent, with her mood and the content of the session  The client has made progress on their goals      Maxime Israel presents with a minimal risk of " "suicide, minimal risk of self-harm, and minimal risk of harm to others  For any risk assessment that surpasses a \"low\" rating, a safety plan must be developed  A safety plan was indicated: no  If yes, describe in detail n/a    PLAN: Between sessions, Ester Gottron will continue to use movement/stretching to help her manage her pain and physical discomfort  At the next session, the therapist will use Client-centered Therapy, Dialectical Behavior Therapy, Mindfulness-based Strategies, Motivational Interviewing, Solution-Focused Therapy and Supportive Psychotherapy to address her mood regulation and relationship concerns  Behavioral Health Treatment Plan and Discharge Planning: Ester Gottron is aware of and agrees to continue to work on their treatment plan  They have identified and are working toward their discharge goals   yes    Visit start and stop times:    06/23/23  Start Time: 0804  Stop Time: 4847  Total Visit Time: 51 minutes      "

## 2023-06-28 ENCOUNTER — APPOINTMENT (OUTPATIENT)
Dept: PHYSICAL THERAPY | Age: 33
End: 2023-06-28
Payer: COMMERCIAL

## 2023-06-30 ENCOUNTER — OFFICE VISIT (OUTPATIENT)
Dept: PHYSICAL THERAPY | Age: 33
End: 2023-06-30
Payer: COMMERCIAL

## 2023-06-30 DIAGNOSIS — M79.7 FIBROMYALGIA: ICD-10-CM

## 2023-06-30 DIAGNOSIS — M94.0 COSTOCHONDRITIS: Primary | ICD-10-CM

## 2023-06-30 PROCEDURE — 97113 AQUATIC THERAPY/EXERCISES: CPT

## 2023-06-30 NOTE — PROGRESS NOTES
"Daily Note     Today's date: 2023  Patient name: Jean-Claude Duncan  : 1990  MRN: [id number]  Referring provider: Mayi Sandy PA-C  Dx:   Encounter Diagnosis     ICD-10-CM    1  Costochondritis  M94 0       2  Fibromyalgia  M79 7                      Subjective: Pt reported that \"md has increased my prednisone because I am still having a bad flare up my ribs still hurt\"       Objective: See treatment diary below      Assessment: Rib pain/soreness with trunk exercises  Progress as able       Plan: Continue per plan of care        Manuals  6/30   4/24 5/1 5/8 5/10 5/17 5/19 6/2 6/5 6/16                               HEP: postural correction seated              HEP lumbar extension against counter                            Ther Ex                            Water walking pre and post 4 laps   4 laps 4x PRE   Only  4x 4x  4x 6x  6x  6x  6x   Seated hamstring stretch:B: 20 sec 5x  20 sec x 5 20sec 3x  20sec 5x  20sec 5x  20sec 5x  20 sec x 5 10sec 5x  20sec 5x  [id number]   LAQ:B: 20x    2 x 10 20x  20x 20x  20x  20x  20x  20x  20x   Hip flexion:B: 20x  2 x 10 20x  20x 20x  20x  20x  20x  20x  20x   Seated postural correction slough over correct 20x  2 x 10 20x  20x 20x  20x  NT 20x  20x  20x   Cervical spine retraction 20x 2sec   2 x 10 20x 2sec  20x 3sec  20x 3sec  20x 3sec  3 sec x 20 15x 2sec  13x 2sec  20x 2sec   UT stretch:B: 20x 2sec   20 sec x 5 20sec 5x  20sec 5x  [id number]  20sec 5x 20 sec x 5 20sec 5x  20sec 5x  20sec 5x   LS stretch:B: 20sec 5x   20 sec x 5 20sec 5x  20sec 5x  20sec 5x  20sec 5x  20 sec x 5 20sec 5x 20sec 5x  20sec 5x                 Standing scapular squeezes 20x 3sec    3 sec x 20 20x 3sec  20x 3sec  20x 3sec  20x 3sec  3 sec x 20 15x 3sec  13x 3sec  10x 3 sec                  Standing slr x 3:B: 20x   2 x 10 20x  20X  20X  20X  2 x 10 20x  20X  20x   Standing hr and tr:B: 20x   2 x 10 20x  20X 20X  20X  2 x 10 20x  20X  20x                 SLS B 10x 10sec  10 sec x 10 10x 10sec  " 10X 1S0EC  10x 10sec  10X 10SEC  10 sec x 10 10sec 10x  10SEC 10X  [id number]   Mini squats 20x   2 x 10 20x  20X  20X 20X  Hold NT 20X np     tandem stance:B: 10x 10 sec   NT NT NT NT NT NT NT 10X 10SEC  10x10 sec   Side stepping  6x   6 x  6X 6X 6X 6X  6X  6X  6X  6x     tandem ambulation 6x  NT 6X 6X         Forward step ups:B:   NT NT NT NT NT NT NT NT NT   Standing lumbar spine extension 30x   3 x 10 20X  20x  20x  20X  2 x 10 20X  20X  30x   Paddles rows, horizontal add / abd and shoulder add / abd   NT NT NT NT NT NT NT NT NT   Pool pony bicycle   5 min 3 MIN  5 MIN  5 MIN  5 MIN  Hold today 8 MIN  NT NT                 Ther Activity                            Gait Training                            Modalities

## 2023-07-05 ENCOUNTER — OFFICE VISIT (OUTPATIENT)
Dept: PHYSICAL THERAPY | Age: 33
End: 2023-07-05
Payer: COMMERCIAL

## 2023-07-05 DIAGNOSIS — M79.7 FIBROMYALGIA: ICD-10-CM

## 2023-07-05 DIAGNOSIS — M94.0 COSTOCHONDRITIS: Primary | ICD-10-CM

## 2023-07-05 PROCEDURE — 97113 AQUATIC THERAPY/EXERCISES: CPT

## 2023-07-05 NOTE — PROGRESS NOTES
Daily Note     Today's date: 2023  Patient name: Deion Cornell  : 1990  MRN: [id number]  Referring provider: Vicki Medina PA-C  Dx:   Encounter Diagnosis     ICD-10-CM    1. Costochondritis  M94.0       2. Fibromyalgia  M79.7                      Subjective: Pt reported "I am having another flare my ribs still hurt and now I have a migraine"       Objective: See treatment diary below      Assessment: Fair tolerance to exercises with breaks. Progress as able       Plan: Continue per plan of care.       Manuals  6/30  7/5 4/24 5/1 5/8 5/10 5/17 5/19 6/2 6/5 6/16                               HEP: postural correction seated              HEP lumbar extension against counter                            Ther Ex                            Water walking pre and post 4 laps  4 laps  4 laps 4x PRE   Only  4x 4x  4x 6x  6x  6x  6x   Seated hamstring stretch:B: 20 sec 5x 20sec 5x  20 sec x 5 20sec 3x  20sec 5x  20sec 5x  20sec 5x  20 sec x 5 10sec 5x  20sec 5x  [id number]   LAQ:B: 20x   20x  2 x 10 20x  20x 20x  20x  20x  20x  20x  20x   Hip flexion:B: 20x 20x  2 x 10 20x  20x 20x  20x  20x  20x  20x  20x   Seated postural correction slough over correct 20x 20x  2 x 10 20x  20x 20x  20x  NT 20x  20x  20x   Cervical spine retraction 20x 2sec  20x 3sec  2 x 10 20x 2sec  20x 3sec  20x 3sec  20x 3sec  3 sec x 20 15x 2sec  13x 2sec  20x 2sec   UT stretch:B: 20x 2sec  20x 3sec  20 sec x 5 20sec 5x  20sec 5x  [id number]  20sec 5x 20 sec x 5 20sec 5x  20sec 5x  20sec 5x   LS stretch:B: 20sec 5x  20sec 5x  20 sec x 5 20sec 5x  20sec 5x  20sec 5x  20sec 5x  20 sec x 5 20sec 5x 20sec 5x  20sec 5x                 Standing scapular squeezes 20x 3sec  20x 3sec  3 sec x 20 20x 3sec  20x 3sec  20x 3sec  20x 3sec  3 sec x 20 15x 3sec  13x 3sec  10x 3 sec                  Standing slr x 3:B: 20x  20x  2 x 10 20x  20X  20X  20X  2 x 10 20x  20X  20x   Standing hr and tr:B: 20x  20x  2 x 10 20x  20X 20X  20X  2 x 10 20x  20X  20x SLS B 10x 10sec 10x 10sec  10 sec x 10 10x 10sec  10X 1S0EC  10x 10sec  10X 10SEC  10 sec x 10 10sec 10x  10SEC 10X  [id number]   Mini squats 20x  HOLD 2 x 10 20x  20X  20X 20X  Hold NT 20X np     tandem stance:B: 10x 10 sec  10x 10sec  NT NT NT NT NT NT NT 10X 10SEC  10x10 sec   Side stepping  6x  6x 6 x  6X 6X 6X 6X  6X  6X  6X  6x     tandem ambulation 6x 6x NT 6X 6X         Forward step ups:B:  NT NT NT NT NT NT NT NT NT NT   Standing lumbar spine extension 30x  30x  3 x 10 20X  20x  20x  20X  2 x 10 20X  20X  30x   Paddles rows, horizontal add / abd and shoulder add / abd  NT NT NT NT NT NT NT NT NT NT   Pool pony bicycle  5 MIN  5 min 3 MIN  5 MIN  5 MIN  5 MIN  Hold today 8 MIN  NT NT                 Ther Activity                            Gait Training                            Modalities

## 2023-07-06 ENCOUNTER — OFFICE VISIT (OUTPATIENT)
Dept: PHYSICAL THERAPY | Age: 33
End: 2023-07-06
Payer: COMMERCIAL

## 2023-07-06 DIAGNOSIS — M94.0 COSTOCHONDRITIS: ICD-10-CM

## 2023-07-06 DIAGNOSIS — M79.7 FIBROMYALGIA: Primary | ICD-10-CM

## 2023-07-06 PROCEDURE — 97113 AQUATIC THERAPY/EXERCISES: CPT

## 2023-07-06 NOTE — PROGRESS NOTES
Daily Note     Today's date: 2023  Patient name: Jarvis Corado  : 1990  MRN: [id number]  Referring provider: Nick Mojica PA-C  Dx:   Encounter Diagnosis     ICD-10-CM    1. Fibromyalgia  M79.7       2. Costochondritis  M94.0                      Subjective: Pt reported left foot "tenderness and soreness since yesterday" otherwise feeling better. Objective: See treatment diary below      Assessment: Decreased overall pain. Pt instructed in ROM exercises for L foot HEP       Plan: Continue per plan of care.       Manuals  6/30  7/5 7/6 5/1 5/8 5/10 5/17 5/19 6/2 6/5 6/16                               HEP: postural correction seated              HEP lumbar extension against counter                            Ther Ex                            Water walking pre and post 4 laps  4 laps  4 laps 4x PRE   Only  4x 4x  4x 6x  6x  6x  6x   Seated hamstring stretch:B: 20 sec 5x 20sec 5x  20 sec x 5 20sec 3x  20sec 5x  20sec 5x  20sec 5x  20 sec x 5 10sec 5x  20sec 5x  [id number]   LAQ:B: 20x   20x  2 x 10 20x  20x 20x  20x  20x  20x  20x  20x   Hip flexion:B: 20x 20x  2 x 10 20x  20x 20x  20x  20x  20x  20x  20x   Seated postural correction slough over correct 20x 20x  2 x 10 20x  20x 20x  20x  NT 20x  20x  20x   Cervical spine retraction 20x 2sec  20x 3sec  2 x 10 20x 2sec  20x 3sec  20x 3sec  20x 3sec  3 sec x 20 15x 2sec  13x 2sec  20x 2sec   UT stretch:B: 20x 2sec  20x 3sec  20 sec x 5 20sec 5x  20sec 5x  [id number]  20sec 5x 20 sec x 5 20sec 5x  20sec 5x  20sec 5x   LS stretch:B: 20sec 5x  20sec 5x  20 sec x 5 20sec 5x  20sec 5x  20sec 5x  20sec 5x  20 sec x 5 20sec 5x 20sec 5x  20sec 5x                 Standing scapular squeezes 20x 3sec  20x 3sec  3 sec x 20 20x 3sec  20x 3sec  20x 3sec  20x 3sec  3 sec x 20 15x 3sec  13x 3sec  10x 3 sec                  Standing slr x 3:B: 20x  20x  2 x 10 20x  20X  20X  20X  2 x 10 20x  20X  20x   Standing hr and tr:B: 20x  20x  2 x 10 20x  20X 20X  20X  2 x 10 20x 20X  20x                 SLS B 10x 10sec 10x 10sec  10 sec x 10 10x 10sec  10X 1S0EC  10x 10sec  10X 10SEC  10 sec x 10 10sec 10x  10SEC 10X  [id number]   Mini squats 20x  HOLD 2 x 10 20x  20X  20X 20X  Hold NT 20X np     tandem stance:B: 10x 10 sec  10x 10sec  NT NT NT NT NT NT NT 10X 10SEC  10x10 sec   Side stepping  6x  6x 6 x  6X 6X 6X 6X  6X  6X  6X  6x     tandem ambulation 6x 6x NT 6X 6X         Forward step ups:B:  NT NT NT NT NT NT NT NT NT NT   Standing lumbar spine extension 30x  30x  3 x 10 20X  20x  20x  20X  2 x 10 20X  20X  30x   Paddles rows, horizontal add / abd and shoulder add / abd  NT NT NT NT NT NT NT NT NT NT   Pool pony bicycle  5 MIN  5 min 3 MIN  5 MIN  5 MIN  5 MIN  Hold today 8 MIN  NT NT                 Ther Activity                            Gait Training                            Modalities

## 2023-07-07 ENCOUNTER — TELEPHONE (OUTPATIENT)
Dept: OBGYN CLINIC | Facility: MEDICAL CENTER | Age: 33
End: 2023-07-07

## 2023-07-07 ENCOUNTER — OFFICE VISIT (OUTPATIENT)
Dept: OBGYN CLINIC | Facility: CLINIC | Age: 33
End: 2023-07-07
Payer: COMMERCIAL

## 2023-07-07 ENCOUNTER — APPOINTMENT (OUTPATIENT)
Dept: PHYSICAL THERAPY | Age: 33
End: 2023-07-07
Payer: COMMERCIAL

## 2023-07-07 VITALS
SYSTOLIC BLOOD PRESSURE: 126 MMHG | BODY MASS INDEX: 46.95 KG/M2 | HEIGHT: 63 IN | WEIGHT: 265 LBS | DIASTOLIC BLOOD PRESSURE: 82 MMHG

## 2023-07-07 DIAGNOSIS — N90.7 LABIAL CYST: Primary | ICD-10-CM

## 2023-07-07 PROCEDURE — 99213 OFFICE O/P EST LOW 20 MIN: CPT | Performed by: ADVANCED PRACTICE MIDWIFE

## 2023-07-07 RX ORDER — SULFAMETHOXAZOLE AND TRIMETHOPRIM 800; 160 MG/1; MG/1
1 TABLET ORAL EVERY 12 HOURS SCHEDULED
Qty: 20 TABLET | Refills: 0 | Status: SHIPPED | OUTPATIENT
Start: 2023-07-07 | End: 2023-07-17

## 2023-07-07 NOTE — TELEPHONE ENCOUNTER
Patient scheduled at our [facility] location at [facility] today for further evaluation.

## 2023-07-07 NOTE — TELEPHONE ENCOUNTER
Pt noticed a new lump in pelvic area below abdomen. It is not causing any pain, doesn't think it's reddened from what she can tell. Not itchy or painful, but uncomfortable to touch. It is under the skin more than raised. Pt would like to be seen asap but with scheduling she requested if someone can call her to see if she should come in for this since appt's are booked a little farther out than she'd like. She does have a yearly in November that she will be coming too if this deems ok to wait until then. She requested Rene specifically but advised her that if she needs an appt based off of clinical staff review it may be a different location/provider to ensure she gets seen as soon as possible. Please review, thank you.

## 2023-07-07 NOTE — PROGRESS NOTES
[facility] [address] [place] [address]    Assessment/Plan:  No problem-specific Assessment & Plan notes found for this encounter. Diagnoses and all orders for this visit:    Labial cyst  -     sulfamethoxazole-trimethoprim (BACTRIM DS) 800-160 mg per tablet; Take 1 tablet by mouth every 12 (twelve) hours for 10 days    - do not squeeze or press on area  - monitor for enlargement after course of antibiotics  - will call if any changes or concerns      Subjective:   Yahaira Teague is a 29 y.o. [facility] female. CC: labial lump    HPI: Lukasz Nunn is here with complaints of a lump right labial area. Noted 2 days ago. Pressure when pushing on area and no redness. No vaginal discharge, irritation, itching or odor. No trauma to the area. Sexually activity- no. No medications or treatments at this time. Stopped shaving and using Merian Daniel for 6 months. No other new products. Notes that she is having more urinary frequency. ROS: Review of Systems   Constitutional: Negative for chills, fatigue and fever. Gastrointestinal: Positive for diarrhea. Negative for constipation. Genitourinary: Positive for frequency and urgency. Negative for difficulty urinating, vaginal bleeding, vaginal discharge and vaginal pain. PFSH: The following portions of the patient's history were reviewed and updated as appropriate: allergies, current medications, past family history, past medical history, obstetric history, gynecologic history, past social history, past surgical history and problem list.       Objective:  /82   Ht 5' 3" (1.6 m)   Wt 120 kg (265 lb)   LMP  (LMP Unknown)   BMI 46.94 kg/m²    Physical Exam  Vitals reviewed. Constitutional:       Appearance: Normal appearance. Genitourinary:     Labia:         Right: No tenderness or lesion. Left: No tenderness or lesion. Urethra: No urethral pain, urethral swelling or urethral lesion.       Vagina: No vaginal discharge, erythema, tenderness or bleeding. Comments: Right upper area of labia majora, soft, mobile lump 1 cm in size. No erythema, pain or superficial skin change. Lump is deeper in the mons area and Stephanie notes discomfort with firmer palpation. Neurological:      Mental Status: She is alert and oriented to person, place, and time.    Psychiatric:         Mood and Affect: Mood normal.         Behavior: Behavior normal.         Judgment: Judgment normal.

## 2023-07-10 ENCOUNTER — OFFICE VISIT (OUTPATIENT)
Dept: PHYSICAL THERAPY | Age: 33
End: 2023-07-10
Payer: COMMERCIAL

## 2023-07-10 DIAGNOSIS — M94.0 COSTOCHONDRITIS: ICD-10-CM

## 2023-07-10 DIAGNOSIS — M79.7 FIBROMYALGIA: Primary | ICD-10-CM

## 2023-07-10 PROCEDURE — 97113 AQUATIC THERAPY/EXERCISES: CPT

## 2023-07-10 NOTE — PROGRESS NOTES
Daily Note     Today's date: 7/10/2023  Patient name: Rohan Anderson  : 1990  MRN: [id number]  Referring provider: Yoko Hooks PA-C  Dx:   Encounter Diagnosis     ICD-10-CM    1. Fibromyalgia  M79.7       2. Costochondritis  M94.0           Start Time: 0900          Subjective: Pt reported "I am in a flare again, my chest and both my heels hurt a lot, my left foot swells when I stand for a short time"       Objective: See treatment diary below      Assessment: Decreased pain and improved mobility. Short term relief symptoms between sessions. Plan: Continue per plan of care.       Manuals  6/30  7/5 7/6 7/10 5/8 5/10 5/17 5/19 6/2 6/5 6/16                               HEP: postural correction seated              HEP lumbar extension against counter                            Ther Ex                            Water walking pre and post 4 laps  4 laps  4 laps 4x PRE   Only  4x 4x  4x 6x  6x  6x  6x   Seated hamstring stretch:B: 20 sec 5x 20sec 5x  20 sec x 5 20sec 3x  20sec 5x  20sec 5x  20sec 5x  20 sec x 5 10sec 5x  20sec 5x  [id number]   LAQ:B: 20x   20x  2 x 10 20x  20x 20x  20x  20x  20x  20x  20x   Hip flexion:B: 20x 20x  2 x 10 20x  20x 20x  20x  20x  20x  20x  20x   Seated postural correction slough over correct 20x 20x  2 x 10 20x  20x 20x  20x  NT 20x  20x  20x   Cervical spine retraction 20x 2sec  20x 3sec  2 x 10 20x 2sec  20x 3sec  20x 3sec  20x 3sec  3 sec x 20 15x 2sec  13x 2sec  20x 2sec   UT stretch:B: 20x 2sec  20x 3sec  20 sec x 5 20sec 5x  20sec 5x  [id number]  20sec 5x 20 sec x 5 20sec 5x  20sec 5x  20sec 5x   LS stretch:B: 20sec 5x  20sec 5x  20 sec x 5 20sec 5x  20sec 5x  20sec 5x  20sec 5x  20 sec x 5 20sec 5x 20sec 5x  20sec 5x   Standing B curls     20x           Standing scapular squeezes 20x 3sec  20x 3sec  3 sec x 20 20x 3sec  20x 3sec  20x 3sec  20x 3sec  3 sec x 20 15x 3sec  13x 3sec  10x 3 sec                  Standing slr x 3:B: 20x  20x  2 x 10 20x  20X  20X  20X  2 x 10 20x  20X  20x   Standing hr and tr:B: 20x  20x  2 x 10 20x  20X 20X  20X  2 x 10 20x  20X  20x                 SLS B 10x 10sec 10x 10sec  10 sec x 10 10x 10sec  10X 1S0EC  10x 10sec  10X 10SEC  10 sec x 10 10sec 10x  10SEC 10X  [id number]   Mini squats 20x  HOLD 2 x 10 NT 20X  20X 20X  Hold NT 20X np     tandem stance:B: 10x 10 sec  10x 10sec  NT NT NT NT NT NT NT 10X 10SEC  10x10 sec   Side stepping  6x  6x 6 x  6X 6X 6X 6X  6X  6X  6X  6x     tandem ambulation 6x 6x NT 6X 6X         Forward step ups:B:  NT NT 20x  NT NT NT NT NT NT NT   Standing lumbar spine extension 30x  30x  3 x 10 20X  20x  20x  20X  2 x 10 20X  20X  30x   Paddles rows, horizontal add / abd and shoulder add / abd  NT NT NT NT NT NT NT NT NT NT   Pool pony bicycle  5 MIN  5 min 5 MIN  5 MIN  5 MIN  5 MIN  Hold today 8 MIN  NT NT                 Ther Activity                            Gait Training                            Modalities

## 2023-07-11 ENCOUNTER — OFFICE VISIT (OUTPATIENT)
Dept: FAMILY MEDICINE CLINIC | Facility: CLINIC | Age: 33
End: 2023-07-11
Payer: COMMERCIAL

## 2023-07-11 VITALS
OXYGEN SATURATION: 98 % | HEART RATE: 95 BPM | BODY MASS INDEX: 46.6 KG/M2 | DIASTOLIC BLOOD PRESSURE: 88 MMHG | TEMPERATURE: 97.7 F | WEIGHT: 263 LBS | SYSTOLIC BLOOD PRESSURE: 146 MMHG | HEIGHT: 63 IN

## 2023-07-11 DIAGNOSIS — M79.672 LEFT FOOT PAIN: ICD-10-CM

## 2023-07-11 DIAGNOSIS — R42 DIZZINESS: Primary | ICD-10-CM

## 2023-07-11 PROBLEM — J01.00 ACUTE NON-RECURRENT MAXILLARY SINUSITIS: Status: ACTIVE | Noted: 2023-07-11

## 2023-07-11 PROCEDURE — 99214 OFFICE O/P EST MOD 30 MIN: CPT | Performed by: FAMILY MEDICINE

## 2023-07-11 RX ORDER — AMOXICILLIN AND CLAVULANATE POTASSIUM 875; 125 MG/1; MG/1
1 TABLET, FILM COATED ORAL EVERY 12 HOURS SCHEDULED
Qty: 14 TABLET | Refills: 0 | Status: SHIPPED | OUTPATIENT
Start: 2023-07-11 | End: 2023-07-18

## 2023-07-11 NOTE — PROGRESS NOTES
Assessment/Plan: Exercises given. Patient use Augmentin as directed. Patient will be following up with ENT appropriately. Patient will be referred to orthopedics for chronic left foot pain which is intermittent with swelling and pain. Diagnoses and all orders for this visit:    Dizziness  -     amoxicillin-clavulanate (AUGMENTIN) 875-125 mg per tablet; Take 1 tablet by mouth every 12 (twelve) hours for 7 days    Left foot pain  -     Ambulatory Referral to Orthopedic Surgery; Future            Subjective:        Patient ID: Dmitriy Plummer is a 29 y.o. female. Patient is here with dizziness intermittently with change of head position over the past week. No fevers noted. Patient does notice some nasal congestion. Patient with some left ear discomfort also. No sore throat. No vertigo noted. No syncope chest pain palpitations. Patient is on Bactrim. The following portions of the patient's history were reviewed and updated as appropriate: allergies, current medications, past family history, past medical history, past social history, past surgical history and problem list.      Review of Systems   Constitutional: Negative. Negative for fever. HENT: Positive for ear pain and rhinorrhea. Negative for sore throat. Eyes: Negative. Respiratory: Negative. Negative for cough. Cardiovascular: Negative. Gastrointestinal: Negative. Endocrine: Negative. Genitourinary: Negative. Musculoskeletal: Negative. Skin: Negative. Allergic/Immunologic: Negative. Neurological: Positive for dizziness and light-headedness. Hematological: Negative. Psychiatric/Behavioral: Negative.             Objective:               /88 (BP Location: Right arm, Patient Position: Sitting, Cuff Size: Standard)   Pulse 95   Temp 97.7 °F (36.5 °C) (Temporal)   Ht 5' 3" (1.6 m)   Wt 119 kg (263 lb)   LMP  (LMP Unknown)   SpO2 98%   BMI 46.59 kg/m²          Physical Exam  Vitals and nursing note reviewed. Constitutional:       General: She is not in acute distress. Appearance: Normal appearance. She is not ill-appearing, toxic-appearing or diaphoretic. HENT:      Head: Normocephalic and atraumatic. Right Ear: Tympanic membrane, ear canal and external ear normal. There is no impacted cerumen. Left Ear: Tympanic membrane, ear canal and external ear normal. There is no impacted cerumen. Nose: Nose normal. No congestion or rhinorrhea. Mouth/Throat:      Mouth: Mucous membranes are moist.      Pharynx: Oropharyngeal exudate present. No posterior oropharyngeal erythema. Eyes:      General: No scleral icterus. Right eye: No discharge. Left eye: No discharge. Extraocular Movements: Extraocular movements intact. Conjunctiva/sclera: Conjunctivae normal.      Pupils: Pupils are equal, round, and reactive to light. Neck:      Vascular: No carotid bruit. Cardiovascular:      Rate and Rhythm: Normal rate and regular rhythm. Pulses: Normal pulses. Heart sounds: Normal heart sounds. No murmur heard. No friction rub. No gallop. Pulmonary:      Effort: Pulmonary effort is normal. No respiratory distress. Breath sounds: Normal breath sounds. No stridor. No wheezing, rhonchi or rales. Chest:      Chest wall: No tenderness. Musculoskeletal:         General: Tenderness present. No swelling, deformity or signs of injury. Cervical back: Normal range of motion and neck supple. No rigidity. No muscular tenderness. Right lower leg: No edema. Left lower leg: Edema present. Comments: Pain dorsal aspect left foot. Lymphadenopathy:      Cervical: No cervical adenopathy. Skin:     General: Skin is warm and dry. Capillary Refill: Capillary refill takes less than 2 seconds. Coloration: Skin is not jaundiced. Findings: No bruising, erythema, lesion or rash.    Neurological:      Mental Status: She is alert and oriented to person, place, and time. Mental status is at baseline. Cranial Nerves: No cranial nerve deficit. Sensory: No sensory deficit. Motor: No weakness. Coordination: Coordination normal.      Gait: Gait normal.   Psychiatric:         Mood and Affect: Mood normal.         Behavior: Behavior normal.         Thought Content:  Thought content normal.         Judgment: Judgment normal.

## 2023-07-12 ENCOUNTER — OFFICE VISIT (OUTPATIENT)
Dept: PODIATRY | Facility: CLINIC | Age: 33
End: 2023-07-12
Payer: COMMERCIAL

## 2023-07-12 ENCOUNTER — APPOINTMENT (OUTPATIENT)
Dept: RADIOLOGY | Facility: CLINIC | Age: 33
End: 2023-07-12
Payer: COMMERCIAL

## 2023-07-12 VITALS
WEIGHT: 263 LBS | HEART RATE: 106 BPM | SYSTOLIC BLOOD PRESSURE: 176 MMHG | HEIGHT: 63 IN | BODY MASS INDEX: 46.6 KG/M2 | DIASTOLIC BLOOD PRESSURE: 82 MMHG

## 2023-07-12 DIAGNOSIS — M72.2 PLANTAR FASCIITIS OF LEFT FOOT: Primary | ICD-10-CM

## 2023-07-12 DIAGNOSIS — M79.672 LEFT FOOT PAIN: ICD-10-CM

## 2023-07-12 PROCEDURE — 20550 NJX 1 TENDON SHEATH/LIGAMENT: CPT | Performed by: STUDENT IN AN ORGANIZED HEALTH CARE EDUCATION/TRAINING PROGRAM

## 2023-07-12 PROCEDURE — 99243 OFF/OP CNSLTJ NEW/EST LOW 30: CPT | Performed by: STUDENT IN AN ORGANIZED HEALTH CARE EDUCATION/TRAINING PROGRAM

## 2023-07-12 PROCEDURE — 73630 X-RAY EXAM OF FOOT: CPT

## 2023-07-12 RX ORDER — TRIAMCINOLONE ACETONIDE 40 MG/ML
40 INJECTION, SUSPENSION INTRA-ARTICULAR; INTRAMUSCULAR ONCE
Status: COMPLETED | OUTPATIENT
Start: 2023-07-12 | End: 2023-07-12

## 2023-07-12 RX ORDER — DEXAMETHASONE SODIUM PHOSPHATE 4 MG/ML
4 INJECTION, SOLUTION INTRA-ARTICULAR; INTRALESIONAL; INTRAMUSCULAR; INTRAVENOUS; SOFT TISSUE ONCE
Status: COMPLETED | OUTPATIENT
Start: 2023-07-12 | End: 2023-07-12

## 2023-07-12 RX ORDER — BUPIVACAINE HYDROCHLORIDE 2.5 MG/ML
1 INJECTION, SOLUTION EPIDURAL; INFILTRATION; INTRACAUDAL ONCE
Status: COMPLETED | OUTPATIENT
Start: 2023-07-12 | End: 2023-07-12

## 2023-07-12 RX ADMIN — DEXAMETHASONE SODIUM PHOSPHATE 4 MG: 4 INJECTION, SOLUTION INTRA-ARTICULAR; INTRALESIONAL; INTRAMUSCULAR; INTRAVENOUS; SOFT TISSUE at 15:43

## 2023-07-12 RX ADMIN — TRIAMCINOLONE ACETONIDE 40 MG: 40 INJECTION, SUSPENSION INTRA-ARTICULAR; INTRAMUSCULAR at 15:43

## 2023-07-12 RX ADMIN — BUPIVACAINE HYDROCHLORIDE 1 ML: 2.5 INJECTION, SOLUTION EPIDURAL; INFILTRATION; INTRACAUDAL at 15:41

## 2023-07-12 NOTE — PATIENT INSTRUCTIONS
Plantar Fasciitis Exercises   WHAT YOU NEED TO KNOW:   Plantar fasciitis exercises help stretch your plantar fascia, calf muscles, and Achilles tendon. They also help strengthen the muscles that support your heel and foot. Exercises and stretching can help prevent plantar fasciitis from getting worse or coming back. DISCHARGE INSTRUCTIONS:   Call your doctor or physical therapist if:   Your pain and swelling increase. You develop new knee, hip, or back pain. You have questions or concerns about your condition or care. How to do plantar fasciitis exercises:  Ask your healthcare provider when to start these exercises and how often to do them. Slant board stretch:  Stand on a slanted board with your toes higher than your heel. Press your heel into the board. Keep your knee slightly bent. Hold this position for 1 minute. Repeat 5 times. Heel stretch:  Stand up straight with your hands on a wall. Place your injured leg slightly behind your other leg. Keep your heels flat on the floor, lean forward, and bend both knees. Hold for 30 seconds. Calf stretch:  Stand up straight with your hands on a wall. Step forward so that your uninjured foot is in front of your injured foot. Keep your front leg bent and your back leg straight. Gently lean forward until you feel your calf stretch. Hold for 30 seconds and then relax. Seated plantar fascia stretch:  Sit on a firm surface, such as the floor or a mat. Extend your legs out in front of you. Raise your injured foot a few inches off the ground. Keep your leg straight. Grab the toes of your injured foot and pull them toward you. With your other hand, feel your plantar fascia. You should feel it press outward. Hold for 30 seconds. If you cannot reach your toes, loop a towel or tie around your foot. Gently pull on the towel or tie and flex your toes toward you. Heel raises:  Stand on the injured leg. Raise your other leg off the ground. Hold onto a railing or wall for balance. Slowly rise up on the toes of your injured leg. Hold for 5 seconds. Slowly lower your heel to the ground. Toe curls:  Place a towel on the floor. Put your foot flat on the towel. Grab the towel with your toes by curling them around the towel. Lift the towel up with your toes. Toe taps:  Sit down and place your foot flat on the floor. Keep your heel on the floor. Point all your toes up toward the ceiling. While the 4 smaller toes are pointed up, bend your big toe down and tap it on the ground. Do 10 to 50 taps. Point all 5 toes up toward the ceiling again. This time keep your big toe pointed up and tap the 4 smaller toes on the ground. Do 10 to 50 taps each time. Follow up with your doctor or physical therapist as directed:  Write down your questions so you remember to ask them during your visits. © Copyright [employer] 2022 Information is for End User's use only and may not be sold, redistributed or otherwise used for commercial purposes. The above information is an  only. It is not intended as medical advice for individual conditions or treatments. Talk to your doctor, nurse or pharmacist before following any medical regimen to see if it is safe and effective for you.

## 2023-07-12 NOTE — LETTER
July 13, 2023     Tino Tomas, [address] [place] [address] [address] [place] [address]    Patient: Kita Almanzar   YOB: 1990   Date of Visit: 7/12/2023       Dear Dr. Mateusz Mascorro:    Thank you for referring Ed Pinedo to me for evaluation. Below are my notes for this consultation. If you have questions, please do not hesitate to call me. I look forward to following your patient along with you. Sincerely,        Antonieta Stringer DPM        CC: No Recipients    [facility]  7/13/2023  9:14 AM  Signed  Assessment/Plan:    No problem-specific Assessment & Plan notes found for this encounter. Diagnoses and all orders for this visit:    Plantar fasciitis of left foot  -     Ambulatory referral to Physical Therapy; Future  -     triamcinolone acetonide (KENALOG-40) 40 mg/mL injection 40 mg  -     dexamethasone (DECADRON) injection 4 mg  -     bupivacaine (PF) (MARCAINE) 0.25 % injection 1 mL    Left foot pain  -     Ambulatory Referral to Orthopedic Surgery  -     X-ray foot left 3+ views; Future  -     Ambulatory referral to Physical Therapy; Future  -     triamcinolone acetonide (KENALOG-40) 40 mg/mL injection 40 mg  -     dexamethasone (DECADRON) injection 4 mg  -     bupivacaine (PF) (MARCAINE) 0.25 % injection 1 mL     Plan:     - Discussed diagnosis and treatment options for left plantar fasciitis. - Provided home therapy and stretching exercises, supportive shoe in house and out (sneaker such as New balance), OTC orthotics, OTC NSAIDs as tolerated and Night Splints.   -Left foot x-rays reviewed, I personally reviewed x-ray finding with patient which is consistent without any acute osseous abnormalities. -Left heel injection was given after obtaining verbal consent. A formal timeout including patient identification, laterality and existing allergies using [facility] protocol was conducted.  I injected (after sterile prep of the skin) a mixture of 0.5cc 0.25% marcaine plain and 0.5cc Kenelog 40mg and 0.5cc of Dex 4mg. The injection was given at the plantar origin of the medial fascial band at the periosteal level. The patient tolerated it well. - Patient agrees with plan and will continue home exercises  and formal physical therapy  - Return in 4 weeks   - All questions and concerns were addressed, call if any questions. Subjective:     Patient ID: Macey Carrillo is a 29 y.o. female. 36-year-old female with past medical history significant as below presents for an evaluation of left heel pain with duration of months. Patient reports most of her pain is located on the plantar aspect of her heel medially with weightbearing. Reports pain is worse when taking first few steps after prolonged rest.  She also complains of intermittent swelling to her left foot. She denies any recent trauma to the left foot. Patient does report she has a history of fibromyalgia. No other complaints at this time. The following portions of the patient's history were reviewed and updated as appropriate:   She  has a past medical history of Abnormal Pap smear of cervix, Allergic, Anxiety, Arthritis, Depression, Diabetes mellitus ([address]), Fibromyalgia, primary, Hypertension, IBS (irritable bowel syndrome), Migraine, Obesity, and Vitamin B12 deficiency.   She   Patient Active Problem List    Diagnosis Date Noted   • Dizziness 07/11/2023   • Left foot pain 07/11/2023   • Acute non-recurrent maxillary sinusitis 07/11/2023   • Left ear pain 05/15/2023   • Facial pain 05/15/2023   • Nausea 03/30/2023   • Tenderness of left temporomandibular joint 11/14/2022   • Restless leg syndrome 11/11/2022   • Intertrigo 11/11/2022   • TMJ (dislocation of temporomandibular joint) 10/07/2022   • Bilateral temporomandibular joint pain 10/07/2022   • Panic disorder without agoraphobia 09/23/2022   • Intractable chronic migraine without aura and with status migrainosus 08/23/2022   • Prediabetes 08/23/2022   • Disequilibrium 08/03/2022 • Basilar migraine 08/03/2022   • Myofascial muscle pain 08/03/2022   • Physiological tremor 08/03/2022   • Costochondritis 07/21/2022   • Ankylosing spondylitis ([address]) 06/22/2022   • Functional diarrhea 05/23/2022   • Parasitic infection 05/10/2022   • Non-intractable vomiting with nausea 01/14/2022   • Blurred vision 12/08/2021   • Hypertension 12/08/2021   • Hyperinsulinemia 10/20/2021   • Gastroesophageal reflux disease without esophagitis 09/17/2021   • Morbid obesity with BMI of 40.0-44.9, adult ([address]) 08/04/2021   • TMJ pain dysfunction syndrome 06/08/2021   • Lower extremity edema 05/26/2021   • Cystitis 03/17/2021   • Pilar cysts 02/03/2021   • Dysplastic nevi 02/03/2021   • Encephalopathy 12/21/2020   • Arthralgia of multiple sites 11/02/2020   • Chronic low back pain 11/02/2020   • Elevated C-reactive protein 11/02/2020   • Inflammatory spondylopathy ([address]) 11/02/2020   • Knee pain 11/02/2020   • Elevated blood pressure reading 07/13/2020   • Pharyngitis due to Streptococcus species 06/02/2020   • Paresthesias 04/28/2020   • Well adult exam 04/15/5711   • Other complicated headache syndrome 09/10/2019   • Insomnia due to medical condition 09/10/2019   • Chronic heel pain, left 08/28/2019   • Irritant contact dermatitis due to oils 08/19/2019   • Depression, major, recurrent, moderate ([address]) 07/01/2019   • URI (upper respiratory infection) 04/24/2019   • Hand dermatitis 04/12/2019   • Intractable migraine with aura without status migrainosus 01/11/2019   • Sleep disturbance 11/27/2018   • Snoring 11/27/2018   • Fibromyalgia syndrome 11/27/2018   • Obesity (BMI 30-39.9) 11/27/2018   • Upper respiratory infection 11/13/2018   • Dental infection 09/12/2018   • Possible pregnancy 07/26/2018   • Migraine with aura and with status migrainosus 07/23/2018   • Cognitive decline 06/27/2018   • Memory loss 06/15/2018   • Chronic fatigue 05/24/2018   • Alternating constipation and diarrhea 04/17/2018   • Abnormal bowel habits 04/17/2018   • Chronic migraine without aura without status migrainosus, not intractable 03/13/2018   • Anterior chest wall pain 02/22/2018   • Migraine headache 06/22/2017   • Cervical radiculitis 06/10/2016   • Seasonal allergies 03/24/2015   • Lumbar radiculopathy 01/05/2015   • Vitamin B12 deficiency 07/30/2014   • Hyperlipidemia 03/24/2014   • Vitamin D deficiency 02/26/2014   • Generalized anxiety disorder 11/19/2012     She  has a past surgical history that includes Tonsillectomy; [place] tooth extraction; Colonoscopy (N/A, 5/8/2018); pr exc b9 lesion mrgn xcp sk tg s/n/h/f/g > 4.0cm (N/A, 7/1/2021); and pr repair complex scalp/arm/leg 2.6-7.5 cm (N/A, 7/1/2021). .    Review of Systems   All other systems reviewed and are negative. Objective:      BP (!) 176/82   Pulse (!) 106   Ht 5' 3" (1.6 m)   Wt 119 kg (263 lb)   LMP  (LMP Unknown)   BMI 46.59 kg/m²         Physical Exam  Vitals reviewed. Musculoskeletal:      Left foot: Decreased range of motion. Tenderness present. Comments: Pain with palpation noted to the left medial plantar calcaneal tubercle at the level of plantar fascia origin. Upon toe extension plantar fascia appears to be very tight. Patient exhibits equinus deformity with decreased ankle dorsiflexion when knee extended compared to knee flexed. Foot injection left heel     Date/Time 7/12/2023 2:45 PM     Performed by  Jaquelin Colón DPM   Authorized by Jaquelin Colón DPM     Universal Protocol   Consent: Verbal consent obtained. Risks and benefits: risks, benefits and alternatives were discussed  Consent given by: patient  Time out: Immediately prior to procedure a "time out" was called to verify the correct patient, procedure, equipment, support staff and site/side marked as required.   Patient understanding: patient states understanding of the procedure being performed  Patient identity confirmed: verbally with patient       Procedure Details   Patient tolerance: patient tolerated the procedure well with no immediate complications

## 2023-07-13 ENCOUNTER — TELEMEDICINE (OUTPATIENT)
Dept: BEHAVIORAL/MENTAL HEALTH CLINIC | Facility: CLINIC | Age: 33
End: 2023-07-13
Payer: COMMERCIAL

## 2023-07-13 ENCOUNTER — TELEPHONE (OUTPATIENT)
Dept: NEUROLOGY | Facility: CLINIC | Age: 33
End: 2023-07-13

## 2023-07-13 ENCOUNTER — OFFICE VISIT (OUTPATIENT)
Dept: PHYSICAL THERAPY | Age: 33
End: 2023-07-13
Payer: COMMERCIAL

## 2023-07-13 ENCOUNTER — TELEMEDICINE (OUTPATIENT)
Dept: PSYCHIATRY | Facility: CLINIC | Age: 33
End: 2023-07-13
Payer: COMMERCIAL

## 2023-07-13 DIAGNOSIS — F41.1 GENERALIZED ANXIETY DISORDER: Chronic | ICD-10-CM

## 2023-07-13 DIAGNOSIS — F41.0 PANIC DISORDER WITHOUT AGORAPHOBIA: ICD-10-CM

## 2023-07-13 DIAGNOSIS — F33.1 DEPRESSION, MAJOR, RECURRENT, MODERATE (HCC): Primary | Chronic | ICD-10-CM

## 2023-07-13 DIAGNOSIS — G43.709 CHRONIC MIGRAINE WITHOUT AURA WITHOUT STATUS MIGRAINOSUS, NOT INTRACTABLE: ICD-10-CM

## 2023-07-13 DIAGNOSIS — G25.81 RESTLESS LEG SYNDROME: ICD-10-CM

## 2023-07-13 DIAGNOSIS — M54.16 LUMBAR RADICULOPATHY: ICD-10-CM

## 2023-07-13 DIAGNOSIS — F43.12 CHRONIC POST-TRAUMATIC STRESS DISORDER (PTSD): ICD-10-CM

## 2023-07-13 DIAGNOSIS — M79.7 FIBROMYALGIA: Primary | ICD-10-CM

## 2023-07-13 DIAGNOSIS — M46.90 INFLAMMATORY SPONDYLOPATHY, UNSPECIFIED SPINAL REGION (HCC): ICD-10-CM

## 2023-07-13 DIAGNOSIS — M94.0 COSTOCHONDRITIS: ICD-10-CM

## 2023-07-13 PROCEDURE — 99213 OFFICE O/P EST LOW 20 MIN: CPT | Performed by: PSYCHIATRY & NEUROLOGY

## 2023-07-13 PROCEDURE — 90834 PSYTX W PT 45 MINUTES: CPT | Performed by: SOCIAL WORKER

## 2023-07-13 PROCEDURE — 97113 AQUATIC THERAPY/EXERCISES: CPT | Performed by: PHYSICAL THERAPIST

## 2023-07-13 RX ORDER — PRAMIPEXOLE DIHYDROCHLORIDE 0.25 MG/1
0.25 TABLET ORAL
Qty: 34 TABLET | Refills: 3 | Status: SHIPPED | OUTPATIENT
Start: 2023-07-13

## 2023-07-13 RX ORDER — BUPROPION HYDROCHLORIDE 300 MG/1
300 TABLET ORAL DAILY
Qty: 90 TABLET | Refills: 0 | Status: SHIPPED | OUTPATIENT
Start: 2023-07-13

## 2023-07-13 RX ORDER — ESCITALOPRAM OXALATE 20 MG/1
20 TABLET ORAL DAILY
Qty: 90 TABLET | Refills: 0 | Status: SHIPPED | OUTPATIENT
Start: 2023-07-13

## 2023-07-13 RX ORDER — INDOMETHACIN 25 MG/1
CAPSULE ORAL
Qty: 30 CAPSULE | Refills: 0 | Status: SHIPPED | OUTPATIENT
Start: 2023-07-13 | End: 2023-10-06 | Stop reason: SDUPTHER

## 2023-07-13 RX ORDER — PRAZOSIN HYDROCHLORIDE 2 MG/1
2 CAPSULE ORAL
Qty: 30 CAPSULE | Refills: 2 | Status: SHIPPED | OUTPATIENT
Start: 2023-07-13

## 2023-07-13 RX ORDER — ALPRAZOLAM 0.5 MG/1
0.5 TABLET ORAL 2 TIMES DAILY PRN
Qty: 60 TABLET | Refills: 2 | Status: SHIPPED | OUTPATIENT
Start: 2023-07-13

## 2023-07-13 RX ORDER — OLANZAPINE 2.5 MG/1
2.5 TABLET ORAL DAILY
Qty: 30 TABLET | Refills: 2 | Status: SHIPPED | OUTPATIENT
Start: 2023-07-13

## 2023-07-13 NOTE — TELEPHONE ENCOUNTER
Mauricio Ramirez, my name is Brooks Seymour,  [birth year]. And I need a refill on my indomethacin  I believe it's 25 milligrams as needed. My phone number is [phone]. Thank you.   last visit Cherry Bianchi, 3/30

## 2023-07-13 NOTE — PSYCH
Virtual Regular Visit    Verification of patient location:    Patient is located at Home in the following state in which I hold an active license [place]    Assessment/Plan:    Problem List Items Addressed This Visit        Other    Generalized anxiety disorder (Chronic)    Depression, major, recurrent, moderate (HCC) - Primary (Chronic)       Goals addressed in session: Goal 1      Reason for visit is No chief complaint on file. Encounter provider APARNA Mast    Provider located at [address] [place] [id number] [phone]      Recent Visits  Date Type Provider Dept   07/11/23 Office Visit Halina Manzo DO [address] [facility] recent visits within past 7 days and meeting all other requirements  Today's Visits  Date Type Provider Dept   07/13/23 215 [address]   Showing today's visits and meeting all other requirements  Future Appointments  No visits were found meeting these conditions. Showing future appointments within next 150 days and meeting all other requirements       The patient was identified by name and date of birth. Ramon Pedro was informed that this is a telemedicine visit and [facility] She agrees to proceed. .  My office door was closed. No one else was in the room. She acknowledged consent and understanding of privacy and security of the video platform. The patient has agreed to participate and understands they can discontinue the visit at any time. Patient is aware this is a billable service. Mirna Barajas is a 29 y.o. female.     HPI     Past Medical History:   Diagnosis Date   • Abnormal Pap smear of cervix    • Allergic    • Anxiety    • Arthritis    • Depression    • Diabetes mellitus ([address])    • Fibromyalgia, primary    • Hypertension    • IBS (irritable bowel syndrome)    • Migraine    • Obesity    • Vitamin B12 deficiency        Past Surgical History:   Procedure Laterality Date   • COLONOSCOPY N/A 5/8/2018    Procedure: COLONOSCOPY;  Surgeon: Ignacio Rodriguez MD;  Location: [facility]; Service: Gastroenterology   • MD EXC B9 LESION MRGN XCP SK TG S/N/H/F/G > 4.0CM N/A 7/1/2021    Procedure: EXCISION OF PILAR SCALP CYST X 2 AND RIGHT INNER GROIN NEVVUS;  Surgeon: Gene Fernandez MD;  Location: [address] [place] [address] OR;  Service: Plastics   • MD REPAIR COMPLEX SCALP/ARM/LEG 2.6-7.5 CM N/A 7/1/2021    Procedure: CLOSURE WOUND SCALP X 2 AND RIGHT INNER GROIN;  Surgeon: Gene Fernandez MD;  Location: [address] [place] [address] OR;  Service: Plastics   • TONSILLECTOMY     • WISDOM TOOTH EXTRACTION         Current Outpatient Medications   Medication Sig Dispense Refill   • Aimovig 140 MG/ML SOAJ Inject 140mg (1 pen) under the skin every 30 days. 1 mL 11   • ALPRAZolam (XANAX) 0.5 mg tablet Take 1 tablet (0.5 mg total) by mouth 2 (two) times a day as needed for anxiety 60 tablet 2   • amoxicillin-clavulanate (AUGMENTIN) 875-125 mg per tablet Take 1 tablet by mouth every 12 (twelve) hours for 7 days 14 tablet 0   • buPROPion (WELLBUTRIN XL) 300 mg 24 hr tablet Take 1 tablet (300 mg total) by mouth daily 90 tablet 0   • Cholecalciferol (Vitamin D3) 50 MCG (2000 UT) capsule Take 2,000 Units by mouth daily     • cyanocobalamin 1,000 mcg/mL 1 IM injection every week x 4 weeks, then 1 every month 4 mL 2   • diazepam (VALIUM) 5 mg tablet Take 1 tablet (5 mg total) by mouth daily at bedtime as needed for sleep or muscle spasms 30 tablet 1   • dicyclomine (BENTYL) 20 mg tablet Take 1 tablet (20 mg total) by mouth 2 (two) times a day for 7 days 14 tablet 0   • escitalopram (LEXAPRO) 20 mg tablet Take 1 tablet (20 mg total) by mouth daily 90 tablet 0   • etonogestrel-ethinyl estradiol (NuvaRing) 0.12-0.015 MG/24HR vaginal ring Insert ring for 21 days then remove for one week.  3 each 4   • FeroSul 325 (65 Fe) MG tablet Take 1 tablet by mouth in the morning     • indomethacin (INDOCIN) 25 mg capsule 1-2 tabs BID prn severe headache with food. Hold toradol. 30 capsule 0   • magnesium Oxide (MAG-OX) 400 mg TABS Take 1 tablet (400 mg total) by mouth daily 30 tablet 0   • methylPREDNISolone 4 MG tablet therapy pack Use as directed on package 21 tablet 0   • OLANZapine (ZyPREXA) 2.5 mg tablet Take 1 tablet (2.5 mg total) by mouth daily Do not take with reglan. 30 tablet 2   • onabotulinumtoxin A (BOTOX) 100 units Inject as directed     • ondansetron (ZOFRAN-ODT) 4 mg disintegrating tablet Take 1 tablet (4 mg total) by mouth every 6 (six) hours as needed for nausea or vomiting 90 tablet 0   • pantoprazole (PROTONIX) 40 mg tablet Take 1 tablet (40 mg total) by mouth daily 30 tablet 3   • pramipexole (MIRAPEX) 0.25 mg tablet Take 1 tablet (0.25 mg total) by mouth daily at bedtime 90 tablet 1   • prazosin (MINIPRESS) 2 mg capsule Take 1 capsule (2 mg total) by mouth daily at bedtime 30 capsule 2   • predniSONE 5 mg tablet      • sulfamethoxazole-trimethoprim (BACTRIM DS) 800-160 mg per tablet Take 1 tablet by mouth every 12 (twelve) hours for 10 days 20 tablet 0   • Syringe/Needle, Disp, (SYRINGE 3CC/40BH5-4/2") 25G X 1-1/2" 3 ML MISC Use for B12 IM injections. 4 each 0   • Trudhesa 0.725 MG/ACT AERS 1 spray in each nostril for total dose of 1.45mg, may repeat 1 dose after 1 hour. Max 2 doses per 24 hours and 3 doses per week.  12 mL 6     Current Facility-Administered Medications   Medication Dose Route Frequency Provider Last Rate Last Admin   • cyanocobalamin injection 1,000 mcg  1,000 mcg Intramuscular Q30 Days Birgit Escobar, DO   1,000 mcg at 08/03/20 3075   • cyanocobalamin injection 1,000 mcg  1,000 mcg Intramuscular Q14 Days Birgit Escobar DO   1,000 mcg at 05/18/20 1146   • cyanocobalamin injection 1,000 mcg  1,000 mcg Intramuscular Q30 Days Birgit Escobar DO   1,000 mcg at 09/01/20 1121 Allergies   Allergen Reactions   • Cimzia [Certolizumab Pegol] Swelling   • Desvenlafaxine      Other reaction(s): state of confusion   • Ixekizumab Vomiting   • Valproic Acid Other (See Comments)     Other reaction(s): dilated pupils, "schizi"   • Voltaren [Diclofenac] Swelling   • Tizanidine Anxiety       Review of Systems    Video Exam    There were no vitals filed for this visit. Physical Exam     Behavioral Health Psychotherapy Progress Note    Psychotherapy Provided: Individual Psychotherapy     1. Depression, major, recurrent, moderate ([address])        2. Generalized anxiety disorder            Goals addressed in session: Goal 1     DATA: Met with ct for scheduled individual session. She provided an update re: her physical health issues. Shasta Carnes was referred to physical therapy for several different pain-related issues. She plans to continue with her medical care to decrease her pain level and increase her ability to participate in physical activities. Shasta Carnes discussed her birthday celebration and her frustration with her friend Dexter Randall. She states that they had planned to spend time together, and her friend invited someone else over and neglected to spend time with Shasta Carnes. She states that she has not seen her since and has had very little communication with her. We discussed Stephanie's thoughts/feelings about maintaining a relationship with her. Shasta Carnes states that, if the kids were not involved, she would no longer maintain this friendship. We discussed spending time with other people. Stephanie discussed feeling upset and using her positive coping skills, rather than going shopping. She discussed reaching out to some of her friends to get support. Shasta Carnes identifies that she has integrated some of her coping skills into her daily routines. She also believes that she is moving forward, and her friend is stuck. We discussed how the relationship might change.  Overall, Shasta Carnes states that she feels that she is making progress on her therapeutic goals. We discussed the dates of her upcoming appointments, and this clinician asked her to check on them to ensure that the dates will work for her. During this session, this clinician used the following therapeutic modalities: Client-centered Therapy, Dialectical Behavior Therapy, Mindfulness-based Strategies, Motivational Interviewing, Solution-Focused Therapy and Supportive Psychotherapy    Substance Abuse was not addressed during this session. If the client is diagnosed with a co-occurring substance use disorder, please indicate any changes in the frequency or amount of use: n/a. Stage of change for addressing substance use diagnoses: No substance use/Not applicable    ASSESSMENT:  Duane Vanegas presents with a primarily Euthymic/ normal mood. her affect is Normal range and intensity, which is congruent, with her mood and the content of the session. The client has made progress on their goals. Duane Vanegas presents with a minimal risk of suicide, minimal risk of self-harm, and minimal risk of harm to others. For any risk assessment that surpasses a "low" rating, a safety plan must be developed. A safety plan was indicated: no  If yes, describe in detail n/a    PLAN: Between sessions, Duane Vanegas will continue to attend her physical therapy sessions, to address her physical pain issues. At the next session, the therapist will use Client-centered Therapy, Dialectical Behavior Therapy, Mindfulness-based Strategies, Motivational Interviewing, Solution-Focused Therapy and Supportive Psychotherapy to address her mood regulation and relationship concerns. Behavioral Health Treatment Plan and Discharge Planning: Duane Vanegas is aware of and agrees to continue to work on their treatment plan. They have identified and are working toward their discharge goals.  yes    Visit start and stop times:    07/13/23  Start Time: 1111  Stop Time: 1153  Total Visit Time: 42 minutes

## 2023-07-13 NOTE — PSYCH
Virtual Regular Visit    Verification of patient location:    Patient is located at Home in the following state in which I hold an active license [place]      Assessment/Plan:    Problem List Items Addressed This Visit        Cardiovascular and Mediastinum    Migraine headache    Relevant Medications    OLANZapine (ZyPREXA) 2.5 mg tablet    escitalopram (LEXAPRO) 20 mg tablet    buPROPion (WELLBUTRIN XL) 300 mg 24 hr tablet       Other    Depression, major, recurrent, moderate (HCC) - Primary (Chronic)    Relevant Medications    ALPRAZolam (XANAX) 0.5 mg tablet    OLANZapine (ZyPREXA) 2.5 mg tablet    escitalopram (LEXAPRO) 20 mg tablet    buPROPion (WELLBUTRIN XL) 300 mg 24 hr tablet    Generalized anxiety disorder (Chronic)    Relevant Medications    ALPRAZolam (XANAX) 0.5 mg tablet    OLANZapine (ZyPREXA) 2.5 mg tablet    escitalopram (LEXAPRO) 20 mg tablet    buPROPion (WELLBUTRIN XL) 300 mg 24 hr tablet    Panic disorder without agoraphobia    Relevant Medications    ALPRAZolam (XANAX) 0.5 mg tablet    OLANZapine (ZyPREXA) 2.5 mg tablet    escitalopram (LEXAPRO) 20 mg tablet    buPROPion (WELLBUTRIN XL) 300 mg 24 hr tablet   Other Visit Diagnoses     Chronic post-traumatic stress disorder (PTSD)        Relevant Medications    ALPRAZolam (XANAX) 0.5 mg tablet    prazosin (MINIPRESS) 2 mg capsule    OLANZapine (ZyPREXA) 2.5 mg tablet    escitalopram (LEXAPRO) 20 mg tablet    buPROPion (WELLBUTRIN XL) 300 mg 24 hr tablet                   Reason for visit is   Chief Complaint   Patient presents with   • Virtual Regular Visit        Encounter provider Pete Richardson MD    Provider located at [address] [place] [id number] [phone]      Recent Visits  Date Type Provider Dept   07/11/23 Office Visit Halina Manzo DO [facility] recent visits within past 7 days and meeting all other requirements  Today's Visits  Date Type Provider Dept   07/13/23 Telemedicine Ewelina Mario MD [facility]   Showing today's visits and meeting all other requirements  Future Appointments  No visits were found meeting these conditions. Showing future appointments within next 150 days and meeting all other requirements       The patient was identified by name and date of birth. Thomas Grissom was informed that this is a telemedicine visit and [facility] She agrees to proceed. .  My office door was closed. No one else was in the room. She acknowledged consent and understanding of privacy and security of the video platform. The patient has agreed to participate and understands they can discontinue the visit at any time. Patient is aware this is a billable service. Antonietta Bangura is a 29 y.o. female with MDD,Panic do, BESSY . Nataliia Ingrid stated she continues to struggle with anxiety and frequent panic attacks. She tried switching to Xanax XR and her dose was titrated from 0.5 to 2 mg daily gradually. She felt the XR was less effective for her compared with her previous IR 0.5 mg bid prn. On last visit we switched  back to Xanax 0.5 mg po bid prn.   She continues to meet with her counselor on a regular basis.      Patient stated that since last seen she has been dealing with a lot of physical pain due to ankylosing spondylitis and plantar fasciitis. She stated mood wise she feels she has been stable. She is also taking medical cannabis which helps with her anxiety and also with her chronic pain. She stated she has been discussing with her counselor about returning to school and possibly pursuing a degree in counseling since  she mentioned she will like to help other patients who suffer from panic and severe anxiety.   She agrees to continue current treatment  and will schedule follow up in 3 months or sooner if needed. HPI     Past Medical History:   Diagnosis Date   • Abnormal Pap smear of cervix    • Allergic    • Anxiety    • Arthritis    • Depression    • Diabetes mellitus ([address])    • Fibromyalgia, primary    • Hypertension    • IBS (irritable bowel syndrome)    • Migraine    • Obesity    • Vitamin B12 deficiency        Past Surgical History:   Procedure Laterality Date   • COLONOSCOPY N/A 5/8/2018    Procedure: COLONOSCOPY;  Surgeon: Cora Lazcano MD;  Location: [facility]; Service: Gastroenterology   • OH EXC B9 LESION MRGN XCP SK TG S/N/H/F/G > 4.0CM N/A 7/1/2021    Procedure: EXCISION OF PILAR SCALP CYST X 2 AND RIGHT INNER GROIN NEVVUS;  Surgeon: Siria Alcocer MD;  Location: [address] [place] [address] OR;  Service: Plastics   • OH REPAIR COMPLEX SCALP/ARM/LEG 2.6-7.5 CM N/A 7/1/2021    Procedure: CLOSURE WOUND SCALP X 2 AND RIGHT INNER GROIN;  Surgeon: Siria Alcocer MD;  Location: [address] [place] [address] OR;  Service: Plastics   • TONSILLECTOMY     • WISDOM TOOTH EXTRACTION         Current Outpatient Medications   Medication Sig Dispense Refill   • ALPRAZolam (XANAX) 0.5 mg tablet Take 1 tablet (0.5 mg total) by mouth 2 (two) times a day as needed for anxiety 60 tablet 2   • buPROPion (WELLBUTRIN XL) 300 mg 24 hr tablet Take 1 tablet (300 mg total) by mouth daily 90 tablet 0   • escitalopram (LEXAPRO) 20 mg tablet Take 1 tablet (20 mg total) by mouth daily 90 tablet 0   • OLANZapine (ZyPREXA) 2.5 mg tablet Take 1 tablet (2.5 mg total) by mouth daily Do not take with reglan. 30 tablet 2   • prazosin (MINIPRESS) 2 mg capsule Take 1 capsule (2 mg total) by mouth daily at bedtime 30 capsule 2   • Aimovig 140 MG/ML SOAJ Inject 140mg (1 pen) under the skin every 30 days.  1 mL 11   • amoxicillin-clavulanate (AUGMENTIN) 875-125 mg per tablet Take 1 tablet by mouth every 12 (twelve) hours for 7 days 14 tablet 0   • Cholecalciferol (Vitamin D3) 50 MCG (2000 UT) capsule Take 2,000 Units by mouth daily     • cyanocobalamin 1,000 mcg/mL 1 IM injection every week x 4 weeks, then 1 every month 4 mL 2   • diazepam (VALIUM) 5 mg tablet Take 1 tablet (5 mg total) by mouth daily at bedtime as needed for sleep or muscle spasms 30 tablet 1   • dicyclomine (BENTYL) 20 mg tablet Take 1 tablet (20 mg total) by mouth 2 (two) times a day for 7 days 14 tablet 0   • etonogestrel-ethinyl estradiol (NuvaRing) 0.12-0.015 MG/24HR vaginal ring Insert ring for 21 days then remove for one week. 3 each 4   • FeroSul 325 (65 Fe) MG tablet Take 1 tablet by mouth in the morning     • indomethacin (INDOCIN) 25 mg capsule 1-2 tabs BID prn severe headache with food. Hold toradol. 30 capsule 0   • magnesium Oxide (MAG-OX) 400 mg TABS Take 1 tablet (400 mg total) by mouth daily 30 tablet 0   • methylPREDNISolone 4 MG tablet therapy pack Use as directed on package 21 tablet 0   • onabotulinumtoxin A (BOTOX) 100 units Inject as directed     • ondansetron (ZOFRAN-ODT) 4 mg disintegrating tablet Take 1 tablet (4 mg total) by mouth every 6 (six) hours as needed for nausea or vomiting 90 tablet 0   • pantoprazole (PROTONIX) 40 mg tablet Take 1 tablet (40 mg total) by mouth daily 30 tablet 3   • pramipexole (MIRAPEX) 0.25 mg tablet Take 1 tablet (0.25 mg total) by mouth daily at bedtime 90 tablet 1   • predniSONE 5 mg tablet      • sulfamethoxazole-trimethoprim (BACTRIM DS) 800-160 mg per tablet Take 1 tablet by mouth every 12 (twelve) hours for 10 days 20 tablet 0   • Syringe/Needle, Disp, (SYRINGE 3CC/01MZ1-7/2") 25G X 1-1/2" 3 ML MISC Use for B12 IM injections. 4 each 0   • Trudhesa 0.725 MG/ACT AERS 1 spray in each nostril for total dose of 1.45mg, may repeat 1 dose after 1 hour. Max 2 doses per 24 hours and 3 doses per week.  12 mL 6     Current Facility-Administered Medications   Medication Dose Route Frequency Provider Last Rate Last Admin   • cyanocobalamin injection 1,000 mcg  1,000 mcg Intramuscular Q30 Days Allyn Coker, DO   1,000 mcg at 08/03/20 0859   • cyanocobalamin injection 1,000 mcg  1,000 mcg Intramuscular Q14 Days Birgit Collinsvirgie, DO   1,000 mcg at 05/18/20 1146   • cyanocobalamin injection 1,000 mcg  1,000 mcg Intramuscular Q30 Days Birgit Carrow, DO   1,000 mcg at 09/01/20 1129        Allergies   Allergen Reactions   • Cimzia [Certolizumab Pegol] Swelling   • Desvenlafaxine      Other reaction(s): state of confusion   • Ixekizumab Vomiting   • Valproic Acid Other (See Comments)     Other reaction(s): dilated pupils, "schizi"   • Voltaren [Diclofenac] Swelling   • Tizanidine Anxiety       Review of Systems    Mood Anxiety and Depression   Behavior Normal    Thought Content Disturbing Thoughts, Feelings   General Emotional Problems and Decreased Functioning   Personality Normal   Other Psych Symptoms Normal   Constitutional Negative   ENT Negative   Cardiovascular Negative   Respiratory Negative   Gastrointestinal Negative   Genitourinary Negative   Musculoskeletal Negative   Integumentary Negative   Neurological Negative   Endocrine Normal    Other Symptoms Normal        Laboratory Results:   Recent Labs (last 12 months):    Admission on 02/19/2023, Discharged on 02/19/2023   Component Date Value   • WBC 02/19/2023 11.92 (H)    • RBC 02/19/2023 4.54    • Hemoglobin 02/19/2023 12.7    • Hematocrit 02/19/2023 38.8    • MCV 02/19/2023 86    • MCH 02/19/2023 28.0    • MCHC 02/19/2023 32.7    • RDW 02/19/2023 16.6 (H)    • MPV 02/19/2023 8.8 (L)    • Platelets 97/03/8705 482 (H)    • nRBC 02/19/2023 0    • Neutrophils Relative 02/19/2023 59    • Immat GRANS % 02/19/2023 0    • Lymphocytes Relative 02/19/2023 34    • Monocytes Relative 02/19/2023 6    • Eosinophils Relative 02/19/2023 1    • Basophils Relative 02/19/2023 0    • Neutrophils Absolute 02/19/2023 7.03    • Immature Grans Absolute 02/19/2023 0.04    • Lymphocytes Absolute 02/19/2023 3.99    • Monocytes Absolute 02/19/2023 0.76    • Eosinophils Absolute 02/19/2023 0.06    • Basophils Absolute 02/19/2023 0.04    • Sodium 02/19/2023 132 (L)    • Potassium 02/19/2023 4.1    • Chloride 02/19/2023 103    • CO2 02/19/2023 21    • ANION GAP 02/19/2023 8    • BUN 02/19/2023 14    • Creatinine 02/19/2023 0.72    • Glucose 02/19/2023 106    • Calcium 02/19/2023 9.5    • AST 02/19/2023 15    • ALT 02/19/2023 13    • Alkaline Phosphatase 02/19/2023 46    • Total Protein 02/19/2023 6.9    • Albumin 02/19/2023 3.9    • Total Bilirubin 02/19/2023 0.50    • eGFR 02/19/2023 111    • Lipase 02/19/2023 31    • EXT Preg Test, Ur 02/19/2023 Negative    • Control 02/19/2023 Valid    • Color, UA 02/19/2023 Yellow    • Clarity, UA 02/19/2023 Clear    • Specific Gravity, UA 02/19/2023 >=1.030 (H)    • pH, UA 02/19/2023 5.5    • Leukocytes, UA 02/19/2023 Negative    • Nitrite, UA 02/19/2023 Negative    • Protein, UA 02/19/2023 Negative    • Glucose, UA 02/19/2023 Negative    • Ketones, UA 02/19/2023 Negative    • Urobilinogen, UA 02/19/2023 0.2    • Bilirubin, UA 02/19/2023 Negative    • Occult Blood, UA 02/19/2023 Negative    Admission on 01/29/2023, Discharged on 01/29/2023   Component Date Value   • WBC 01/29/2023 13.78 (H)    • RBC 01/29/2023 4.09    • Hemoglobin 01/29/2023 11.2 (L)    • Hematocrit 01/29/2023 34.3 (L)    • MCV 01/29/2023 84    • MCH 01/29/2023 27.4    • MCHC 01/29/2023 32.7    • RDW 01/29/2023 15.6 (H)    • MPV 01/29/2023 8.6 (L)    • Platelets 50/47/8736 484 (H)    • nRBC 01/29/2023 0    • Neutrophils Relative 01/29/2023 49    • Immat GRANS % 01/29/2023 0    • Lymphocytes Relative 01/29/2023 43    • Monocytes Relative 01/29/2023 7    • Eosinophils Relative 01/29/2023 1    • Basophils Relative 01/29/2023 0    • Neutrophils Absolute 01/29/2023 6.65    • Immature Grans Absolute 01/29/2023 0.04    • Lymphocytes Absolute 01/29/2023 5.88 (H)    • Monocytes Absolute 01/29/2023 1.02    • Eosinophils Absolute 01/29/2023 0.15    • Basophils Absolute 01/29/2023 0.04    • Sodium 01/29/2023 135    • Potassium 01/29/2023 3.4 (L)    • Chloride 01/29/2023 104 • CO2 01/29/2023 21    • ANION GAP 01/29/2023 10    • BUN 01/29/2023 11    • Creatinine 01/29/2023 0.78    • Glucose 01/29/2023 85    • Calcium 01/29/2023 8.7    • eGFR 01/29/2023 101    • SARS-CoV-2 01/29/2023 Negative    • INFLUENZA A PCR 01/29/2023 Negative    • INFLUENZA B PCR 01/29/2023 Negative    • RSV PCR 01/29/2023 Negative    • Color, UA 01/29/2023 Yellow    • Clarity, UA 01/29/2023 Cloudy (A)    • Specific Gravity, UA 01/29/2023 1.025    • pH, UA 01/29/2023 6.0    • Leukocytes, UA 01/29/2023 Trace (A)    • Nitrite, UA 01/29/2023 Negative    • Protein, UA 01/29/2023 Negative    • Glucose, UA 01/29/2023 Negative    • Ketones, UA 01/29/2023 Negative    • Urobilinogen, UA 01/29/2023 0.2    • Bilirubin, UA 01/29/2023 Negative    • Occult Blood, UA 01/29/2023 1+ (A)    • EXT Preg Test, Ur 01/29/2023 Negative    • Control 01/29/2023 Valid    • RBC, UA 01/29/2023 0-1    • WBC, UA 01/29/2023 2-4    • Epithelial Cells 01/29/2023 Moderate (A)    • Bacteria, UA 01/29/2023 Occasional    • MUCUS THREADS 01/29/2023 Occasional (A)    • Total Bilirubin 01/29/2023 0.50    • Bilirubin, Direct 01/29/2023 0.04    • Alkaline Phosphatase 01/29/2023 55    • AST 01/29/2023 9 (L)    • ALT 01/29/2023 10    • Total Protein 01/29/2023 6.5    • Albumin 01/29/2023 3.6    • Magnesium 01/29/2023 2.0    • Monotest 01/29/2023 Negative    Admission on 12/02/2022, Discharged on 12/02/2022   Component Date Value   • WBC 12/02/2022 9.29    • RBC 12/02/2022 4.22    • Hemoglobin 12/02/2022 11.4 (L)    • Hematocrit 12/02/2022 35.7    • MCV 12/02/2022 85    • MCH 12/02/2022 27.0    • MCHC 12/02/2022 31.9    • RDW 12/02/2022 13.9    • MPV 12/02/2022 8.9    • Platelets 22/60/6356 515 (H)    • nRBC 12/02/2022 0    • Neutrophils Relative 12/02/2022 48    • Immat GRANS % 12/02/2022 0    • Lymphocytes Relative 12/02/2022 39    • Monocytes Relative 12/02/2022 9    • Eosinophils Relative 12/02/2022 3    • Basophils Relative 12/02/2022 1    • Neutrophils Absolute 12/02/2022 4.46    • Immature Grans Absolute 12/02/2022 0.03    • Lymphocytes Absolute 12/02/2022 3.66    • Monocytes Absolute 12/02/2022 0.79    • Eosinophils Absolute 12/02/2022 0.30    • Basophils Absolute 12/02/2022 0.05    • Sodium 12/02/2022 137    • Potassium 12/02/2022 3.9    • Chloride 12/02/2022 110 (H)    • CO2 12/02/2022 20 (L)    • ANION GAP 12/02/2022 7    • BUN 12/02/2022 7    • Creatinine 12/02/2022 0.66    • Glucose 12/02/2022 86    • Calcium 12/02/2022 9.1    • Corrected Calcium 12/02/2022 10.0    • AST 12/02/2022 17    • ALT 12/02/2022 18    • Alkaline Phosphatase 12/02/2022 66    • Total Protein 12/02/2022 7.3    • Albumin 12/02/2022 2.9 (L)    • Total Bilirubin 12/02/2022 0.28    • eGFR 12/02/2022 118    • hs TnI 0hr 12/02/2022 <2    • Ventricular Rate 12/02/2022 81    • Atrial Rate 12/02/2022 81    • AR Interval 12/02/2022 166    • QRSD Interval 12/02/2022 90    • QT Interval 12/02/2022 378    • QTC Interval 12/02/2022 439    • P Axis 12/02/2022 39    • QRS Axis 12/02/2022 21    • T Wave Vendor 12/02/2022 25    Admission on 11/25/2022, Discharged on 11/25/2022   Component Date Value   • D-Dimer, Quant 11/25/2022 0.51 (H)    • WBC 11/25/2022 7.16    • RBC 11/25/2022 3.87    • Hemoglobin 11/25/2022 10.8 (L)    • Hematocrit 11/25/2022 33.2 (L)    • MCV 11/25/2022 86    • MCH 11/25/2022 27.9    • MCHC 11/25/2022 32.5    • RDW 11/25/2022 13.5    • MPV 11/25/2022 8.6 (L)    • Platelets 67/68/8059 428 (H)    • nRBC 11/25/2022 0    • Neutrophils Relative 11/25/2022 54    • Immat GRANS % 11/25/2022 0    • Lymphocytes Relative 11/25/2022 37    • Monocytes Relative 11/25/2022 6    • Eosinophils Relative 11/25/2022 3    • Basophils Relative 11/25/2022 0    • Neutrophils Absolute 11/25/2022 3.80    • Immature Grans Absolute 11/25/2022 0.02    • Lymphocytes Absolute 11/25/2022 2.68    • Monocytes Absolute 11/25/2022 0.42    • Eosinophils Absolute 11/25/2022 0.22    • Basophils Absolute 11/25/2022 0.02 • Sodium 11/25/2022 135    • Potassium 11/25/2022 4.0    • Chloride 11/25/2022 105    • CO2 11/25/2022 19 (L)    • ANION GAP 11/25/2022 11    • BUN 11/25/2022 8    • Creatinine 11/25/2022 0.57 (L)    • Glucose 11/25/2022 92    • Calcium 11/25/2022 9.0    • eGFR 11/25/2022 123    • Ventricular Rate 11/25/2022 82    • Atrial Rate 11/25/2022 82    • WA Interval 11/25/2022 162    • QRSD Interval 11/25/2022 88    • QT Interval 11/25/2022 380    • QTC Interval 11/25/2022 443    • P Axis 11/25/2022 36    • QRS Axis 11/25/2022 17    • T Wave Axis 11/25/2022 12    Annual Exam on 11/08/2022   Component Date Value   • Case Report 11/08/2022                      Value:Gynecologic Cytology Report                       Case: [id number]                                  Authorizing Provider:  Mara Desai MD  Collected:           11/08/2022 1344              Ordering Location:     [facility]  Received:            11/08/2022 [address]                                                           First Screen:          [facility]                                                                Specimen:    LIQUID-BASED PAP, SCREENING, Cervix                                                       • Primary Interpretation 11/08/2022 Negative for intraepithelial lesion or malignancy    • Specimen Adequacy 11/08/2022 Satisfactory for evaluation. Endocervical/transformation zone component present. • Additional Information 11/08/2022                      Value: This result contains rich text formatting which cannot be displayed here.    • HPV Other HR 11/08/2022 Positive (A)    • HPV16 11/08/2022 Negative    • HPV18 11/08/2022 Negative    Office Visit on 10/09/2022   Component Date Value   •  RAPID STREP A 10/09/2022 Negative    • Throat Culture 10/09/2022 Negative for beta-hemolytic Streptococcus    Admission on 09/28/2022, Discharged on 09/28/2022   Component Date Value   • EXT PREG TEST UR (Ref: N* 09/28/2022 Negative    • Control 09/28/2022 Valid    Admission on 08/23/2022, Discharged on 08/25/2022   Component Date Value   • WBC 08/23/2022 9.67    • RBC 08/23/2022 3.88    • Hemoglobin 08/23/2022 11.2 (L)    • Hematocrit 08/23/2022 33.5 (L)    • MCV 08/23/2022 86    • MCH 08/23/2022 28.9    • MCHC 08/23/2022 33.4    • RDW 08/23/2022 14.6    • MPV 08/23/2022 8.8 (L)    • Platelets 96/45/8202 487 (H)    • nRBC 08/23/2022 0    • Neutrophils Relative 08/23/2022 50    • Immat GRANS % 08/23/2022 0    • Lymphocytes Relative 08/23/2022 41    • Monocytes Relative 08/23/2022 8    • Eosinophils Relative 08/23/2022 1    • Basophils Relative 08/23/2022 0    • Neutrophils Absolute 08/23/2022 4.74    • Immature Grans Absolute 08/23/2022 0.03    • Lymphocytes Absolute 08/23/2022 4.00    • Monocytes Absolute 08/23/2022 0.73    • Eosinophils Absolute 08/23/2022 0.14    • Basophils Absolute 08/23/2022 0.03    • Sodium 08/23/2022 137    • Potassium 08/23/2022 3.7    • Chloride 08/23/2022 108    • CO2 08/23/2022 23    • ANION GAP 08/23/2022 6    • BUN 08/23/2022 6    • Creatinine 08/23/2022 0.77    • Glucose 08/23/2022 73    • Calcium 08/23/2022 9.4    • Corrected Calcium 08/23/2022 10.2 (H)    • AST 08/23/2022 10    • ALT 08/23/2022 14    • Alkaline Phosphatase 08/23/2022 73    • Total Protein 08/23/2022 7.3    • Albumin 08/23/2022 3.0 (L)    • Total Bilirubin 08/23/2022 0.36    • eGFR 08/23/2022 103    • Preg, Serum 08/23/2022 Negative    • Sed Rate 08/24/2022 44 (H)    • Sodium 08/24/2022 136    • Potassium 08/24/2022 3.9    • Chloride 08/24/2022 105    • CO2 08/24/2022 19 (L)    • ANION GAP 08/24/2022 12    • BUN 08/24/2022 7    • Creatinine 08/24/2022 0.96    • Glucose 08/24/2022 180 (H)    • Calcium 08/24/2022 8.9    • eGFR 08/24/2022 79    • WBC 08/24/2022 8.74    • RBC 08/24/2022 4.13    • Hemoglobin 08/24/2022 11.5    • Hematocrit 08/24/2022 35.0    • MCV 08/24/2022 85    • MCH 08/24/2022 27.8    • MCHC 08/24/2022 32.9    • RDW 08/24/2022 14.4    • Platelets 17/94/8195 502 (H)    • MPV 08/24/2022 9.0    Admission on 08/16/2022, Discharged on 08/16/2022   Component Date Value   • EXT PREG TEST UR (Ref: N* 08/16/2022 negative    • Control 08/16/2022 valid ([id number] exp:2023/10/31    Admission on 08/04/2022, Discharged on 08/04/2022   Component Date Value   • WBC 08/04/2022 14.59 (H)    • RBC 08/04/2022 4.30    • Hemoglobin 08/04/2022 12.1    • Hematocrit 08/04/2022 37.3    • MCV 08/04/2022 87    • MCH 08/04/2022 28.1    • MCHC 08/04/2022 32.4    • RDW 08/04/2022 13.7    • MPV 08/04/2022 8.7 (L)    • Platelets 10/79/5062 555 (H)    • nRBC 08/04/2022 0    • Neutrophils Relative 08/04/2022 84 (H)    • Immat GRANS % 08/04/2022 0    • Lymphocytes Relative 08/04/2022 14    • Monocytes Relative 08/04/2022 2 (L)    • Eosinophils Relative 08/04/2022 0    • Basophils Relative 08/04/2022 0    • Neutrophils Absolute 08/04/2022 12.12 (H)    • Immature Grans Absolute 08/04/2022 0.05    • Lymphocytes Absolute 08/04/2022 1.98    • Monocytes Absolute 08/04/2022 0.35    • Eosinophils Absolute 08/04/2022 0.05    • Basophils Absolute 08/04/2022 0.04    • Sodium 08/04/2022 135    • Potassium 08/04/2022 4.4    • Chloride 08/04/2022 103    • CO2 08/04/2022 22    • ANION GAP 08/04/2022 10    • BUN 08/04/2022 11    • Creatinine 08/04/2022 0.78    • Glucose 08/04/2022 97    • Calcium 08/04/2022 9.3    • AST 08/04/2022 11 (L)    • ALT 08/04/2022 11    • Alkaline Phosphatase 08/04/2022 54    • Total Protein 08/04/2022 6.7    • Albumin 08/04/2022 3.7    • Total Bilirubin 08/04/2022 0.41    • eGFR 08/04/2022 101    • EXT PREG TEST UR (Ref: N* 08/04/2022 negative    • Control 08/04/2022 valid    There may be more visits with results that are not included.      Substance Abuse History:  Social History     Substance and Sexual Activity   Drug Use Yes   • Frequency: 2.0 times per week   • Types: Marijuana    Comment: medical marijuana (vape)       Family Psychiatric History:   Family History   Problem Relation Age of Onset   • Rheum arthritis Mother    • Psoriasis Mother    • Other Mother    • Hypertension Mother    • Diabetes unspecified Mother    • Sjogren's syndrome Mother    • Alcohol abuse Mother    • Drug abuse Mother    • Anxiety disorder Father    • Alcohol abuse Father    • Lung cancer Maternal Grandfather    • Cancer Other         bone   • Diabetes Other    • Other Other         High blood pressure   • Depression Maternal Grandmother        The following portions of the patient's history were reviewed and updated as appropriate: allergies, current medications, past family history, past medical history, past social history, past surgical history and problem list.    Social History     Socioeconomic History   • Marital status: Single     Spouse name: Not on file   • Number of children: 0   • Years of education: 15   • Highest education level: Not on file   Occupational History   • Occupation: Unemployed     Employer: [employer]   Tobacco Use   • Smoking status: Never     Passive exposure: Past   • Smokeless tobacco: Never   Vaping Use   • Vaping Use: Some days   • Start date: 7/1/2019   • Substances: THC, CBD   Substance and Sexual Activity   • Alcohol use: Yes     Comment: rarely   • Drug use: Yes     Frequency: 2.0 times per week     Types: Marijuana     Comment: medical marijuana (vape)   • Sexual activity: Not Currently     Partners: Male     Comment: Nuva ring   Other Topics Concern   • Not on file   Social History Narrative    Home:  Living with mom and MGM        Education:    Pt denies any h/o learning disability Dxs but admits to having great difficulty in math requiring a . She reached childhood milestones on time as far as he knows.     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 On reflection, she feels it was moreso her anxiety as the cause of her leaving school, and she was using the other reason as an excuse so she would not have to admit to anxiety at that time. She is now very open about her anxiety and does not mind that I have this information in the general social section of her chart. Social Determinants of Health     Financial Resource Strain: Low Risk  (8/16/2022)    Overall Financial Resource Strain (CARDIA)    • Difficulty of Paying Living Expenses: Not hard at all   Food Insecurity: No Food Insecurity (8/16/2022)    Hunger Vital Sign    • Worried About Running Out of Food in the Last Year: Never true    • Ran Out of Food in the Last Year: Never true   Transportation Needs: No Transportation Needs (8/16/2022)    PRAPARE - Transportation    • Lack of Transportation (Medical): No    • Lack of Transportation (Non-Medical): No   Physical Activity: Unknown (2/11/2021)    Exercise Vital Sign    • Days of Exercise per Week: 0 days    • Minutes of Exercise per Session: Not on file   Stress: Not on file   Social Connections: Not on file   Intimate Partner Violence: Not on file   Housing Stability: Not on file     Social History     Social History Narrative    Home:  Living with mom and MGM        Education:    Pt denies any h/o learning disability Dxs but admits to having great difficulty in math requiring a . She reached childhood milestones on time as far as he knows. 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  On reflection, she feels it was moreso her anxiety as the cause of her leaving school, and she was using the other reason as an excuse so she would not have to admit to anxiety at that time. She is now very open about her anxiety and does not mind that I have this information in the general social section of her chart. Objective:       Mental status:  Appearance calm and cooperative , adequate hygiene and grooming and good eye contact    Mood dysphoric   Affect affect was constricted   Speech a normal rate and fluent   Thought Processes coherent/organized and normal thought processes   Hallucinations no hallucinations present    Thought Content no delusions   Abnormal Thoughts no suicidal thoughts  and no homicidal thoughts    Orientation  oriented to person and place and time   Remote Memory short term memory intact and long term memory intact   Attention Span concentration intact   Intellect Appears to be of Average Intelligence   Insight Limited insight   Judgement judgment was limited   Muscle Strength n/a   Language no difficulty naming common objects and no difficulty repeating a phrase    Fund of Knowledge displays adequate knowledge of current events, adequate fund of knowledge regarding past history and adequate fund of knowledge regarding vocabulary                Assessment/Plan:       Diagnoses and all orders for this visit:    Depression, major, recurrent, moderate (HCC)    Panic disorder without agoraphobia    Generalized anxiety disorder  -     ALPRAZolam (XANAX) 0.5 mg tablet; Take 1 tablet (0.5 mg total) by mouth 2 (two) times a day as needed for anxiety  -     escitalopram (LEXAPRO) 20 mg tablet; Take 1 tablet (20 mg total) by mouth daily  -     buPROPion (WELLBUTRIN XL) 300 mg 24 hr tablet; Take 1 tablet (300 mg total) by mouth daily    Chronic post-traumatic stress disorder (PTSD)  -     prazosin (MINIPRESS) 2 mg capsule; Take 1 capsule (2 mg total) by mouth daily at bedtime    Chronic migraine without aura without status migrainosus, not intractable  -     OLANZapine (ZyPREXA) 2.5 mg tablet;  Take 1 tablet (2.5 mg total) by mouth daily Do not take with reglan. Treatment Recommendations- Risks Benefits      Immediate Medical/Psychiatric/Psychotherapy Treatments and Any Precautions: continue current treatment     Risks, Benefits And Possible Side Effects Of Medications:  {PSYCH RISK, BENEFITS AND POSSIBLE SIDE EFFECTS (Optional):76873    Controlled Medication Discussion: Discussed with patient Black Box warning on concurrent use of benzodiazepines and opioid medications including sedation, respiratory depression, coma and death. Patient understands the risk of treatment with benzodiazepines in addition to opioids and wants to continue taking those medications. , Discussed with patient the risks of sedation, respiratory depression, impairment of ability to drive and potential for abuse and addiction related to treatment with benzodiazepine medications. The patient understands risk of treatment with benzodiazepine medications, agrees to not drive if feels impaired and agrees to take medications as prescribed. and The patient has been filling controlled prescriptions on time as prescribed to [address] [facility] [address] program.      Psychotherapy Provided: Individual psychotherapy provided. Individual psychotherapy provided: Yes  Counseling was provided during the session today for 16 minutes. Medications, treatment progress and treatment plan reviewed with Cruz. Medication education provided to Cruz. Goals discussed during in session: improve control of anxiety and improve control of depression. Recent stressor including relationship problems, health issues, medical problems, limited support, social difficulties, everyday stressors, ongoing anxiety and chronic mental illness discussed with Cruz.    Coping strategies including compliance with medications, contacting a therapist, deep/slow breathing, eliminating avoidance, engaging in previously avoided activities, exercising, getting into a good routine, improving self-esteem, increasing energy, increasing interest in usual activities, increasing motivation, increasing social interaction, keeping busy at home, maintain healthy diet, maintain heathy sleeping hygiene and maintain positive attitude reviewed with Cruz. Importance of medication and treatment compliance reviewed with Cruz. Educated on importance of medication and treatment compliance. Importance of follow up with family physician for medical issues reviewed with Cruz. Discussed with Cruz acceptance of mental illness diagnosis and need for ongoing psychiatric treatment.   Supportive therapy provided.                  Visit Time    Visit Start Time: 2:30  Visit Stop Time: 3:00  Total Visit Duration: 30 minutes

## 2023-07-13 NOTE — PROGRESS NOTES
PT Evaluation  / PT Reassessment    Today's date: 2023  Patient name: Romeo Chery  : 1990  MRN: [id number]  Referring provider: Corinne Wayne PA-C  Dx:   Encounter Diagnosis     ICD-10-CM    1. Fibromyalgia  M79.7       2. Costochondritis  M94.0       3. Inflammatory spondylopathy, unspecified spinal region ([address])  M46.90       4. Lumbar radiculopathy  M54.16                      Assessment  Assessment details: PT Reassessment: 23. Patient reported the following progress since onset of PT: short term pain reduction, bilateral lower extremity and lumbar spine mobility. But, she noted the following deficits that still persists: prolonged walking, prolonged standing, bending based functional activities. Patient agrees with PT POC to d/c aqua PT after 23 pool treatment session and then transition to land for left foot plantar fasciitis PT IE.    PT Reassessment: 23. Patient reported the following improvement since onset of Pool based PT: decrease in pain, increase in mobility, dressing activities, self ialds and self functional activities. But, she noted the following deficits that still persists: reaching in all directions, bilateral posterior and plantar calcaneal region pain, pain in lumbar spine and chest, lifting, bilateral upper extremity weakness and fatigue with house hold chores like cleaning, sleep deprived. Patient noted she has been in a full body pain flair up over the past two weeks. PT IE: 2023. Patient reported she has FM, which has muscle weakness and pain. Patient noted she has truck pain due to AS and costochondritis. Patient noted she has soreness in left calcaneus and achilles region. Patient noted she is fatigued as well. Patient noted she has pain through out her back, but she noted cervical and lumbar spine pain limits her functionally more than lumbar spine.  Patient noted bilateral upper and lower extremity muscle weakness and fatigue persists with all self and house hold activities. Patient noted the following deficits due to lumbar, thoracic and cervical spine pain: prolonged standing during house hold chores like washing the dishes, vacuuming and sweeping, bending, lifting heavier items, carrying activities, reaching, using the cell phone for prolonged periods of time, sleep deprived with patient reported she sleeps too much or not at all, walking at only one block and then fatigued and repeated stair climbing. Patient noted intermittent bouts of bilateral upper or lower extremity paresthesias. Patient noted she does have TMJ issues as well and thus has jaw pain. Patient noted she is not currently working. Patient denies recent diagnostic testing for th spine regions. Impairments: abnormal gait, abnormal or restricted ROM, abnormal movement, activity intolerance, impaired physical strength, lacks appropriate home exercise program and pain with function  Understanding of Dx/Px/POC: excellent   Prognosis: good  Prognosis details: Patient is a 33y.o. year old female seen for outpatient PT evaluation with pain, mobility and functional deficits due to FM, inflammatory spondylopathy, arthralgia, costochondritis and lumbar radiculopathy. Patient presents with the following progress since onset of PT: decrease in pain, increase in bilateral upper and lower extremity strength, increase in lumbar and cervical spine mobility, gait and transfer improvements and functional progress. Patient presents to PT reassessment with the following problems, concerns, deficits and impairments: lumbar, thoracic and cervical spine pain, decreased lumbar and cervical spine range of motion, decreased bilateral upper and lower extremity mobility and strength, decrease in postural awareness, + TTP, + muscle guarding, functional limitations and decreased tolerance to activity.   Patient would benefit from skilled PT services under the following PT treatment plan to address the above noted deficits: therapeutic exercises and activities to facilitate lumbar and cervical spine mobility and bilateral upper and lower extremity strength, abdominal strengthening and DLS activities, postural reeducation and strengthening, modalities, manual therapy techniques, IASTM techniques, Kinesio taping techniques and a hep. Thank you for the referral. Patient agrees with PT POC to d/c aqua PT after 7/17/23 pool treatment session and then transition to land for left foot plantar fasciitis PT IE. Goals  Short Term goals 4 - 6 weeks  1. Patient will be independent HEP. MET. 2.  Patient will report a 25 - 50% decrease in pain complaints. MET. 3.  Increase strength 1/2 grade. MET. 4.  Increase ROM 5-10 degrees. MET. Long Term goals 8 - 12 weeks  1. Patient will report elimination of pain complaints. Partially MET. 2.  Patient will return to all recreational activities without restriction. Partially MET. 3.  ROM WFL. Partially MET. 4.  Strength 5/5. Partially MET. 5.  Patient will report walking improved by > 10 minutes prior to sitting to rest due to cervical, thoracic, lumbar, bilateral upper and lower extremity symptoms or limitations. Partially MET. 6.  Patient will report ability to perform two flights of steps without cervical, thoracic, lumbar, bilateral upper and lower extremity symptoms or limitations. Partially MET. 7.  Patient will report house hold chores involving static standing improved by > 10 minutes prior to cervical, thoracic, lumbar, bilateral upper and lower extremity symptoms or limitations. Partially MET. 8.  Patient will report ability to bend and lift during house hold activities without cervical, thoracic, lumbar, bilateral upper and lower extremity symptoms or limitations. Partially MET.   9.  Patient will report ability to reach during self and house hold iadls without cervical, thoracic, lumbar, bilateral upper and lower extremity symptoms or limitations. Partially MET. Plan  Patient would benefit from: skilled physical therapy  Planned modality interventions: cryotherapy, TENS, thermotherapy: hydrocollator packs, traction, ultrasound and unattended electrical stimulation  Planned therapy interventions: joint mobilization, manual therapy, massage, aquatic therapy, balance, balance/weight bearing training, body mechanics training, muscle pump exercises, neuromuscular re-education, patient education, postural training, self care, compression, strengthening, stretching, therapeutic activities, therapeutic exercise, therapeutic training, flexibility, functional ROM exercises, gait training, graded activity, graded exercise, graded motor and home exercise program  Frequency: 2x week  Duration in weeks: 12  Treatment plan discussed with: patient        Subjective Evaluation    History of Present Illness  Mechanism of injury: Precautions: GERD, Migraine, HTN, cervical and lumbar radiculopathy, Ankylosing Spondylitis, TMJ, Dizziness, Chronic Fatigue, FM, Vitamin B12 and D Deficiencies. Patient Goals  Patient goals for therapy: decreased pain, increased motion, increased strength, independence with ADLs/IADLs and return to sport/leisure activities  Patient goal: Patient's goal is to get stronger. Pain  At best pain ratin  At worst pain ratin  Location: lumbar, thoracic and cervical spine          Objective     Tenderness     Additional Tenderness Details  Patient is + moderate TTP and severe muscle guarding at thoracic and cervical spine paraspinal musculature and bilateral upper trapezius muscle region. Patient exhibits protracted cervical spine at moderate levels. Patient is + moderate TTP and muscle guarding at lumbar spine erector spinae musculature.     Active Range of Motion   Cervical/Thoracic Spine       Cervical    Flexion: 25 degrees   Extension: 30 degrees      Left lateral flexion: 26 degrees      Right lateral flexion: 22 degrees Left rotation: 52 degrees  Right rotation: 50 degrees         Left Shoulder   Flexion: 160 degrees   Abduction: 154 degrees   External rotation 45°: 60 degrees   Internal rotation 45°: 58 degrees     Right Shoulder   Flexion: 165 degrees   Abduction: 158 degrees   External rotation 45°: 64 degrees   Internal rotation 45°: 62 degrees     Lumbar   Flexion: 88 degrees   Extension: 26 degrees   Left lateral flexion: 20 degrees       Right lateral flexion: 16 degrees   Left rotation: 65 degrees   Right rotation: 58 degrees   Left Hip   Flexion: 104 degrees   Abduction: 16 degrees     Right Hip   Flexion: 106 degrees   Abduction: 24 degrees   Left Knee   Flexion: 104 degrees   Extension: -5 degrees   Extensor la degrees     Right Knee   Flexion: 110 degrees   Extension: -6 degrees   Extensor la degrees with pain  Left Ankle/Foot   Dorsiflexion (ke): 6 degrees   Plantar flexion: 48 degrees     Right Ankle/Foot   Dorsiflexion (ke): 4 degrees   Plantar flexion: 46 degrees     Strength/Myotome Testing     Left Shoulder     Planes of Motion   Flexion: 4-   Abduction: 4-   External rotation at 0°: 4-   Internal rotation at 0°: 4     Right Shoulder     Planes of Motion   Flexion: 4-   Abduction: 4-   External rotation at 0°: 4-   Internal rotation at 0°: 4     Left Hip   Planes of Motion   Flexion: 4  Extension: 4  Abduction: 4  Adduction: 4+    Right Hip   Planes of Motion   Flexion: 4  Extension: 4  Abduction: 4  Adduction: 4+    Left Knee   Flexion: 4  Extension: 4    Right Knee   Flexion: 4  Extension: 4-    Left Ankle/Foot   Dorsiflexion: 4+  Plantar flexion: 5    Right Ankle/Foot   Dorsiflexion: 4  Plantar flexion: 4+                Precautions: GERD, Migraine, HTN, cervical and lumbar radiculopathy, Ankylosing Spondylitis, TMJ, Dizziness, Chronic Fatigue, FM, Vitamin B12 and D Deficiencies.     Manuals  6/30  7/5 7/6 7/10 7/13 5/10 5/17 5/19 6/2 6/5 6/16                               HEP: postural correction seated              HEP lumbar extension against counter                            Ther Ex                            Water walking pre and post 4 laps  4 laps  4 laps 4x PRE   Only  4x 4x  4x 6x  6x  6x  6x   Seated hamstring stretch:B: 20 sec 5x 20sec 5x  20 sec x 5 20sec 3x  20sec 5x  20sec 5x  20sec 5x  20 sec x 5 10sec 5x  20sec 5x  [id number]   LAQ:B: 20x   20x  2 x 10 20x  20x 20x  20x  20x  20x  20x  20x   Hip flexion:B: 20x 20x  2 x 10 20x  20x 20x  20x  20x  20x  20x  20x   Seated postural correction slough over correct 20x 20x  2 x 10 20x  20x 20x  20x  NT 20x  20x  20x   Cervical spine retraction 20x 2sec  20x 3sec  2 x 10 20x 2sec  20x 3sec  20x 3sec  20x 3sec  3 sec x 20 15x 2sec  13x 2sec  20x 2sec   UT stretch:B: 20x 2sec  20x 3sec  20 sec x 5 20sec 5x  20sec 5x  [id number]  20sec 5x 20 sec x 5 20sec 5x  20sec 5x  20sec 5x   LS stretch:B: 20sec 5x  20sec 5x  20 sec x 5 20sec 5x  20sec 5x  20sec 5x  20sec 5x  20 sec x 5 20sec 5x 20sec 5x  20sec 5x   Standing B curls     20x           Standing scapular squeezes 20x 3sec  20x 3sec  3 sec x 20 20x 3sec  20x 3sec  20x 3sec  20x 3sec  3 sec x 20 15x 3sec  13x 3sec  10x 3 sec                  Standing slr x 3:B: 20x  20x  2 x 10 20x  20X  20X  20X  2 x 10 20x  20X  20x   Standing hr and tr:B: 20x  20x  2 x 10 20x  20X 20X  20X  2 x 10 20x  20X  20x                 SLS B 10x 10sec 10x 10sec  10 sec x 10 10x 10sec  10X 1S0EC  10x 10sec  10X 10SEC  10 sec x 10 10sec 10x  10SEC 10X  [id number]   Mini squats 20x  HOLD 2 x 10 NT 20X  20X 20X  Hold NT 20X np     tandem stance:B: 10x 10 sec  10x 10sec  NT NT NT NT NT NT NT 10X 10SEC  10x10 sec   Side stepping  6x  6x 6 x  6X 6X 6X 6X  6X  6X  6X  6x     tandem ambulation 6x 6x NT 6X 6X         Forward step ups:B:  NT NT 20x  NT NT NT NT NT NT NT   Standing lumbar spine extension 30x  30x  3 x 10 20X  20x  20x  20X  2 x 10 20X  20X  30x   Paddles rows, horizontal add / abd and shoulder add / abd  NT NT NT NT NT NT NT NT NT NT   Pool pony bicycle  5 MIN  5 min 5 MIN  5 MIN  5 MIN  5 MIN  Hold today 8 MIN  NT NT   Pool pony hang     5 min         Ther Activity                            Gait Training                            Modalities

## 2023-07-13 NOTE — PROGRESS NOTES
Assessment/Plan:    No problem-specific Assessment & Plan notes found for this encounter. Diagnoses and all orders for this visit:    Plantar fasciitis of left foot  -     Ambulatory referral to Physical Therapy; Future  -     triamcinolone acetonide (KENALOG-40) 40 mg/mL injection 40 mg  -     dexamethasone (DECADRON) injection 4 mg  -     bupivacaine (PF) (MARCAINE) 0.25 % injection 1 mL    Left foot pain  -     Ambulatory Referral to Orthopedic Surgery  -     X-ray foot left 3+ views; Future  -     Ambulatory referral to Physical Therapy; Future  -     triamcinolone acetonide (KENALOG-40) 40 mg/mL injection 40 mg  -     dexamethasone (DECADRON) injection 4 mg  -     bupivacaine (PF) (MARCAINE) 0.25 % injection 1 mL      Plan:     - Discussed diagnosis and treatment options for left plantar fasciitis. - Provided home therapy and stretching exercises, supportive shoe in house and out (sneaker such as New balance), OTC orthotics, OTC NSAIDs as tolerated and Night Splints.   -Left foot x-rays reviewed, I personally reviewed x-ray finding with patient which is consistent without any acute osseous abnormalities. -Left heel injection was given after obtaining verbal consent. A formal timeout including patient identification, laterality and existing allergies using [facility] protocol was conducted. I injected (after sterile prep of the skin) a mixture of 0.5cc 0.25% marcaine plain and 0.5cc Kenelog 40mg and 0.5cc of Dex 4mg. The injection was given at the plantar origin of the medial fascial band at the periosteal level. The patient tolerated it well. - Patient agrees with plan and will continue home exercises  and formal physical therapy  - Return in 4 weeks   - All questions and concerns were addressed, call if any questions. Subjective:      Patient ID: Pop Stapleton is a 29 y.o. female.     73-year-old female with past medical history significant as below presents for an evaluation of left heel pain with duration of months. Patient reports most of her pain is located on the plantar aspect of her heel medially with weightbearing. Reports pain is worse when taking first few steps after prolonged rest.  She also complains of intermittent swelling to her left foot. She denies any recent trauma to the left foot. Patient does report she has a history of fibromyalgia. No other complaints at this time. The following portions of the patient's history were reviewed and updated as appropriate:   She  has a past medical history of Abnormal Pap smear of cervix, Allergic, Anxiety, Arthritis, Depression, Diabetes mellitus ([address]), Fibromyalgia, primary, Hypertension, IBS (irritable bowel syndrome), Migraine, Obesity, and Vitamin B12 deficiency.   She   Patient Active Problem List    Diagnosis Date Noted   • Dizziness 07/11/2023   • Left foot pain 07/11/2023   • Acute non-recurrent maxillary sinusitis 07/11/2023   • Left ear pain 05/15/2023   • Facial pain 05/15/2023   • Nausea 03/30/2023   • Tenderness of left temporomandibular joint 11/14/2022   • Restless leg syndrome 11/11/2022   • Intertrigo 11/11/2022   • TMJ (dislocation of temporomandibular joint) 10/07/2022   • Bilateral temporomandibular joint pain 10/07/2022   • Panic disorder without agoraphobia 09/23/2022   • Intractable chronic migraine without aura and with status migrainosus 08/23/2022   • Prediabetes 08/23/2022   • Disequilibrium 08/03/2022   • Basilar migraine 08/03/2022   • Myofascial muscle pain 08/03/2022   • Physiological tremor 08/03/2022   • Costochondritis 07/21/2022   • Ankylosing spondylitis ([address]) 06/22/2022   • Functional diarrhea 05/23/2022   • Parasitic infection 05/10/2022   • Non-intractable vomiting with nausea 01/14/2022   • Blurred vision 12/08/2021   • Hypertension 12/08/2021   • Hyperinsulinemia 10/20/2021   • Gastroesophageal reflux disease without esophagitis 09/17/2021   • Morbid obesity with BMI of 40.0-44.9, adult ([address]) 08/04/2021   • TMJ pain dysfunction syndrome 06/08/2021   • Lower extremity edema 05/26/2021   • Cystitis 03/17/2021   • Pilar cysts 02/03/2021   • Dysplastic nevi 02/03/2021   • Encephalopathy 12/21/2020   • Arthralgia of multiple sites 11/02/2020   • Chronic low back pain 11/02/2020   • Elevated C-reactive protein 11/02/2020   • Inflammatory spondylopathy ([address]) 11/02/2020   • Knee pain 11/02/2020   • Elevated blood pressure reading 07/13/2020   • Pharyngitis due to Streptococcus species 06/02/2020   • Paresthesias 04/28/2020   • Well adult exam 16/97/4967   • Other complicated headache syndrome 09/10/2019   • Insomnia due to medical condition 09/10/2019   • Chronic heel pain, left 08/28/2019   • Irritant contact dermatitis due to oils 08/19/2019   • Depression, major, recurrent, moderate ([address]) 07/01/2019   • URI (upper respiratory infection) 04/24/2019   • Hand dermatitis 04/12/2019   • Intractable migraine with aura without status migrainosus 01/11/2019   • Sleep disturbance 11/27/2018   • Snoring 11/27/2018   • Fibromyalgia syndrome 11/27/2018   • Obesity (BMI 30-39.9) 11/27/2018   • Upper respiratory infection 11/13/2018   • Dental infection 09/12/2018   • Possible pregnancy 07/26/2018   • Migraine with aura and with status migrainosus 07/23/2018   • Cognitive decline 06/27/2018   • Memory loss 06/15/2018   • Chronic fatigue 05/24/2018   • Alternating constipation and diarrhea 04/17/2018   • Abnormal bowel habits 04/17/2018   • Chronic migraine without aura without status migrainosus, not intractable 03/13/2018   • Anterior chest wall pain 02/22/2018   • Migraine headache 06/22/2017   • Cervical radiculitis 06/10/2016   • Seasonal allergies 03/24/2015   • Lumbar radiculopathy 01/05/2015   • Vitamin B12 deficiency 07/30/2014   • Hyperlipidemia 03/24/2014   • Vitamin D deficiency 02/26/2014   • Generalized anxiety disorder 11/19/2012     She  has a past surgical history that includes Tonsillectomy; [place] tooth extraction; Colonoscopy (N/A, 5/8/2018); pr exc b9 lesion mrgn xcp sk tg s/n/h/f/g > 4.0cm (N/A, 7/1/2021); and pr repair complex scalp/arm/leg 2.6-7.5 cm (N/A, 7/1/2021). .    Review of Systems   All other systems reviewed and are negative. Objective:      BP (!) 176/82   Pulse (!) 106   Ht 5' 3" (1.6 m)   Wt 119 kg (263 lb)   LMP  (LMP Unknown)   BMI 46.59 kg/m²          Physical Exam  Vitals reviewed. Musculoskeletal:      Left foot: Decreased range of motion. Tenderness present. Comments: Pain with palpation noted to the left medial plantar calcaneal tubercle at the level of plantar fascia origin. Upon toe extension plantar fascia appears to be very tight. Patient exhibits equinus deformity with decreased ankle dorsiflexion when knee extended compared to knee flexed. Foot injection left heel     Date/Time 7/12/2023 2:45 PM     Performed by  Clau Katz DPM   Authorized by Clau Katz DPM     Universal Protocol   Consent: Verbal consent obtained. Risks and benefits: risks, benefits and alternatives were discussed  Consent given by: patient  Time out: Immediately prior to procedure a "time out" was called to verify the correct patient, procedure, equipment, support staff and site/side marked as required.   Patient understanding: patient states understanding of the procedure being performed  Patient identity confirmed: verbally with patient       Procedure Details   Patient tolerance: patient tolerated the procedure well with no immediate complications

## 2023-07-13 NOTE — TELEPHONE ENCOUNTER
Pharmacy called,stated patient needs medication refill but can only have 34 pills at a time per her insurance company.

## 2023-07-14 ENCOUNTER — TELEPHONE (OUTPATIENT)
Dept: PSYCHIATRY | Facility: CLINIC | Age: 33
End: 2023-07-14

## 2023-07-14 NOTE — TELEPHONE ENCOUNTER
Writer spoke with patient to schedule follow up. Patient is now scheduled on 10/4 @11 via [facility].

## 2023-07-17 ENCOUNTER — OFFICE VISIT (OUTPATIENT)
Dept: PHYSICAL THERAPY | Age: 33
End: 2023-07-17
Payer: COMMERCIAL

## 2023-07-17 DIAGNOSIS — M94.0 COSTOCHONDRITIS: ICD-10-CM

## 2023-07-17 DIAGNOSIS — M79.7 FIBROMYALGIA: Primary | ICD-10-CM

## 2023-07-17 DIAGNOSIS — M46.90 INFLAMMATORY SPONDYLOPATHY, UNSPECIFIED SPINAL REGION (HCC): ICD-10-CM

## 2023-07-17 PROCEDURE — 97113 AQUATIC THERAPY/EXERCISES: CPT

## 2023-07-17 NOTE — PROGRESS NOTES
Daily Note     Today's date: 2023  Patient name: Tomasz Vance  : 1990  MRN: [id number]  Referring provider: Jud Abdul PA-C  Dx:   Encounter Diagnosis     ICD-10-CM    1. Fibromyalgia  M79.7       2. Costochondritis  M94.0       3. Inflammatory spondylopathy, unspecified spinal region [facility])  M46.90                      Subjective: Patient states her foot is hurting and Dr. Kd Ji she has B plantar fasciatis and treated her with a cortisone injection that has not helped yet, Patient also states she is to continue with pool therapy and transition to land next week. Objective: See treatment diary below      Assessment: Tolerated treatment with mild progression well. Minor soreness in feet while walking or standing too long. Plan: Continue with Plan. Re-evaluate next session. Precautions: GERD, Migraine, HTN, cervical and lumbar radiculopathy, Ankylosing Spondylitis, TMJ, Dizziness, Chronic Fatigue, FM, Vitamin B12 and D Deficiencies.     Manuals  6/30  7/5 7/6 7/10 7/13 7/17 5/17 5/19 6/2 6/5 6/16                               HEP: postural correction seated              HEP lumbar extension against counter                            Ther Ex                            Water walking pre and post 4 laps  4 laps  4 laps 4x PRE   Only  4x 4x  4x 6x  6x  6x  6x   Seated hamstring stretch:B: 20 sec 5x 20sec 5x  20 sec x 5 20sec 3x  20sec 5x  20sec 5x  20sec 5x  20 sec x 5 10sec 5x  20sec 5x  [id number]   LAQ:B: 20x   20x  2 x 10 20x  20x 20x  20x  20x  20x  20x  20x   Hip flexion:B: 20x 20x  2 x 10 20x  20x 20x  20x  20x  20x  20x  20x   Seated postural correction slough over correct 20x 20x  2 x 10 20x  20x 20x  20x  NT 20x  20x  20x   Cervical spine retraction 20x 2sec  20x 3sec  2 x 10 20x 2sec  20x 3sec  20x 3sec  20x 3sec  3 sec x 20 15x 2sec  13x 2sec  20x 2sec   UT stretch:B: 20x 2sec  20x 3sec  20 sec x 5 20sec 5x  20sec 5x  [id number]  20sec 5x 20 sec x 5 20sec 5x  20sec 5x  20sec 5x LS stretch:B: 20sec 5x  20sec 5x  20 sec x 5 20sec 5x  20sec 5x  20sec 5x  20sec 5x  20 sec x 5 20sec 5x 20sec 5x  20sec 5x   Standing B curls     20x           Standing scapular squeezes 20x 3sec  20x 3sec  3 sec x 20 20x 3sec  20x 3sec  20x 3sec  20x 3sec  3 sec x 20 15x 3sec  13x 3sec  10x 3 sec                  Standing slr x 3:B: 20x  20x  2 x 10 20x  20X  20X  20X  2 x 10 20x  20X  20x   Standing hr and tr:B: 20x  20x  2 x 10 20x  20X 20X  20X  2 x 10 20x  20X  20x                 SLS B 10x 10sec 10x 10sec  10 sec x 10 10x 10sec  10X 1S0EC  10x 10sec  10X 10SEC  10 sec x 10 10sec 10x  10SEC 10X  [id number]   Mini squats 20x  HOLD 2 x 10 NT 20X  20X 20X  Hold NT 20X np     tandem stance:B: 10x 10 sec  10x 10sec  NT NT NT NT NT NT NT 10X 10SEC  10x10 sec   Side stepping  6x  6x 6 x  6X 6X 6X 6X  6X  6X  6X  6x     tandem ambulation 6x 6x NT 6X 6X         Forward step ups:B:  NT NT 20x  NT NT NT NT NT NT NT   Standing lumbar spine extension 30x  30x  3 x 10 20X  20x  20x  20X  2 x 10 20X  20X  30x   Paddles rows, horizontal add / abd and shoulder add / abd  NT NT NT NT 2 x 10 NT NT NT NT NT   Pool pony bicycle  5 MIN  5 min 5 MIN  5 MIN  5 MIN  5 MIN  Hold today 8 MIN  NT NT   Pool pony hang     5 min         Ther Activity                            Gait Training                            Modalities

## 2023-07-19 ENCOUNTER — EVALUATION (OUTPATIENT)
Dept: PHYSICAL THERAPY | Age: 33
End: 2023-07-19
Payer: COMMERCIAL

## 2023-07-19 ENCOUNTER — TELEPHONE (OUTPATIENT)
Dept: NEUROLOGY | Facility: CLINIC | Age: 33
End: 2023-07-19

## 2023-07-19 DIAGNOSIS — M79.672 PAIN IN BOTH FEET: Primary | ICD-10-CM

## 2023-07-19 DIAGNOSIS — M72.2 PLANTAR FASCIAL FIBROMATOSIS OF BOTH FEET: ICD-10-CM

## 2023-07-19 DIAGNOSIS — M79.671 PAIN IN BOTH FEET: Primary | ICD-10-CM

## 2023-07-19 PROCEDURE — 97140 MANUAL THERAPY 1/> REGIONS: CPT | Performed by: PHYSICAL THERAPIST

## 2023-07-19 PROCEDURE — 97161 PT EVAL LOW COMPLEX 20 MIN: CPT | Performed by: PHYSICAL THERAPIST

## 2023-07-19 PROCEDURE — 97110 THERAPEUTIC EXERCISES: CPT | Performed by: PHYSICAL THERAPIST

## 2023-07-19 NOTE — TELEPHONE ENCOUNTER
Called [facility] and scheduled delivery with [facility] for:    Botox-200 units  Qty:1  Del[facility] hospitals  Scheduled for 7/20/2023  Via: [employer]    Please advise if medication doesn't arrive.

## 2023-07-19 NOTE — PROGRESS NOTES
PT Evaluation     Today's date: 2023  Patient name: Alessandro Wynne  : 1990  MRN: [id number]  Referring provider: Monica Medina DPM  Dx:   Encounter Diagnosis     ICD-10-CM    1. Pain in both feet  M79.671     M79.672       2. Plantar fascial fibromatosis of both feet  M72.2           Start Time: 1000  Stop Time: 1045  Total time in clinic (min): 45 minutes    Assessment/Plan  Cruz Sexton is a 29 y.o. female who was referred to physical therapy for management of bilateral foot pain secondary to medical diagnosis of plantar fibromatosis. Primary impairments include inc tissue restriction in plantar fascia, poor hip strength, and medial arch collapse in weightbearing positions. Consequently, patient has difficulty completing ADLs including prolonged standing/ambulation. Cruz would benefit from skilled intervention to address all deficits and improve functional capability. Patient is a good candidate for therapy, pending compliance with HEP and consistent participation in physical therapy. Thank you for the referral and please do not hesitate to contact me with any questions or concerns regarding Cruz’s care! Plan  Frequency: 2x per week   Duration in weeks: 12  POC start date: 23  POC end date: 10/11/23   Therapeutic exercise/activity, neuromuscular reeducation, manual therapy, and modalities. Patient understands and agrees to plan of care. Goals  Short Term--4 weeks  1. Patient will demonstrate 2 point decrease in pain levels. 2. Patient will demonstrate 1/2 point increase in all deficient MMT scores. 3. Patient will demonstrate improved awareness of medial arch during standing exercises. 4. Patient will demonstrate 5 deg improvement in DF ROM. Long Term--By Discharge  1. Patient will achieve expected FOTO score. 2. Patient will report minimal to no pain with first steps upon getting out of bed.   3. Patient will tolerate 30 min of standing/ambulation with minimal to no onset of foot pain. Patient's Goal: Decrease pain. Subjective  History   Date of Onset: Approx 3 months ago Description: Insidious onset of heel pain, L worse than R. Patient had CSI on the L side with minimal relief reported. Symptoms  Constant "burning" pain in bilateral plantar surface of her feet (L worse than R, heel > medial arch) that is worse with all WBing activity, especially following extended periods of immobility. Pain at best: 2  Pain at worst: 7      Objective       SLS: RLE: medial arch collapse, LLE: medial arch collapse               MMT    Hip         R          L   Flex. 5 5   Extn. 4+ 4   Abd. 4- 4-                 MMT    Knee      R          L   Flex. 5 5   Extn. 5 5                MMT          AROM          PROM    Ankle         R          L          R         L          R         L   PF 5 5 WNL ~25% limitation     DF. 5 5 5 0 0 3   DF bent knee   9 4 15 8   EHL 5 5 WNL WNL     Inv. 4+ 4+ WNL WNL     Ever. 4+ 4+ WNL WNL                  Precautions: Fibromyalgia. AS. Access Code: [email or web address]  URL: [email or web address]  Date: 07/19/2023  Prepared by: Isabela Alvarez    Exercises  - Seated Toe Curl  - 1 x daily - 20 reps  - Long Sitting Ankle Pumps  - 1 x daily - 20 reps  - Supine Ankle Inversion and Eversion AROM  - 1 x daily - 20 reps  - Long Sitting Calf Stretch with Strap  - 1 x daily - 5 reps - 20 sec hold  - Long Sitting Plantar Fascia Stretch with Towel  - 1 x daily - 5 reps - 20 sec hold  - Gastroc Stretch on Wall  - 3 x daily - 3 reps - 20 sec hold  - Soleus Stretch on Wall  - 3 x daily - 3 reps - 30 sec hold  - Standing Toe Dorsiflexion Stretch  - 3 x daily - 3 reps - 20 sec hold    Manuals 7/18            Calf STM B JAB            PF IASTM B JAB                                      Neuro Re-Ed             Short foot             [place]              Maintain medial arch with:  Mini-squats  Mini-lunges  SLS             clamshells bridges                                       Ther Ex             HEP 10'            Bike/NS             TB ankle 4-way             TB lateral step             Slant board stretch                                                    Ther Activity                                       Gait Training                                       Modalities

## 2023-07-20 NOTE — TELEPHONE ENCOUNTER
Botox number of units: 200  Botox quantity: 1  Arrived at what location: [facility]  Botox at Correct Administering Location: Yes  [address] number: 5074-7840-68  Lot number: [id number]  Expiration Date:02/2026  Appt notes indicate correct medication: Yes  Botox Received and documented and stored in fridge.

## 2023-07-24 ENCOUNTER — OFFICE VISIT (OUTPATIENT)
Dept: PHYSICAL THERAPY | Age: 33
End: 2023-07-24
Payer: COMMERCIAL

## 2023-07-24 DIAGNOSIS — M79.7 FIBROMYALGIA: ICD-10-CM

## 2023-07-24 DIAGNOSIS — M79.672 PAIN IN BOTH FEET: Primary | ICD-10-CM

## 2023-07-24 DIAGNOSIS — M72.2 PLANTAR FASCIAL FIBROMATOSIS OF BOTH FEET: ICD-10-CM

## 2023-07-24 DIAGNOSIS — M79.671 PAIN IN BOTH FEET: Primary | ICD-10-CM

## 2023-07-24 PROCEDURE — 97110 THERAPEUTIC EXERCISES: CPT | Performed by: PHYSICAL THERAPIST

## 2023-07-24 PROCEDURE — 97112 NEUROMUSCULAR REEDUCATION: CPT | Performed by: PHYSICAL THERAPIST

## 2023-07-24 PROCEDURE — 97140 MANUAL THERAPY 1/> REGIONS: CPT | Performed by: PHYSICAL THERAPIST

## 2023-07-24 NOTE — PROGRESS NOTES
Daily Note     Today's date: 2023  Patient name: Marilou Mcmahan  : 1990  MRN: [id number]  Referring provider: Jaquelin Colón DPM  Dx:   Encounter Diagnosis     ICD-10-CM    1. Pain in both feet  M79.671     M79.672       2. Plantar fascial fibromatosis of both feet  M72.2       3. Fibromyalgia  M79.7           Start Time: 1525  Stop Time: 7081  Total time in clinic (min): 40 minutes    Subjective: Noted improved bilateral plantar fascia pain following last session - notes L>R plantar fascia / calf / ankle pain today following daily performance of HEP. Objective: See treatment diary below      Assessment: Tolerated initiation of land-based treatment plan well focused on soft tissue mobilization and ankle / intrinsic foot strengthening. Demonstrated proper execution of short foot in seated position with cueing for tripod foot and hip external rotation - good carryover to standing clamshells and sit to stand with noted difficulty maintaining medial arch due to poor FHL neuromuscular control. Presents with tissue restriction in heel cord / plantar fascia, poor hip strength, and medial arch collapse in weightbearing positions impacting ADLs including prolonged ambulation and stair climbing. Patient would benefit from continued PT to address deficits. Plan: Continue per plan of care. Pt given the option to perform aquatic PT vs land-based PT next session, as current plan is for aquatic per pt. Precautions: Fibromyalgia. AS. Access Code: [email or web address]  URL: [email or web address]  Date: 2023  Prepared by: Valene Finger    Exercises  - Seated Toe Curl  - 1 x daily - 20 reps  - Long Sitting Ankle Pumps  - 1 x daily - 20 reps  - Supine Ankle Inversion and Eversion AROM  - 1 x daily - 20 reps  - Long Sitting Calf Stretch with Strap  - 1 x daily - 5 reps - 20 sec hold  - Long Sitting Plantar Fascia Stretch with Towel  - 1 x daily - 5 reps - 20 sec hold  - Gastroc Stretch on Wall  - 3 x daily - 3 reps - 20 sec hold  - Soleus Stretch on Wall  - 3 x daily - 3 reps - 30 sec hold  - Standing Toe Dorsiflexion Stretch  - 3 x daily - 3 reps - 20 sec hold    Manuals 7/18 7/24           Calf STM B JAB SP           PF IASTM B JAB SP                                     Neuro Re-Ed             Short foot             [place]              Maintain medial arch with:  Mini-squats  Mini-lunges  SLS  Seated 20x5"    Standing clamshell 10x5"    Mini-squat x10           clamshells             bridges                                       Ther Ex             HEP 10'            Bike/NS  5 min           TB ankle 4-way  Blue x10ea           TB lateral step             Slant board stretch  Long-sitting gastroc / soleus stretch c belt 3 x 30sec           Seated great toe stretch  3 x 30 sec                                     Ther Activity                                       Gait Training                                       Modalities

## 2023-07-26 ENCOUNTER — OFFICE VISIT (OUTPATIENT)
Dept: PHYSICAL THERAPY | Age: 33
End: 2023-07-26
Payer: COMMERCIAL

## 2023-07-26 DIAGNOSIS — M79.7 FIBROMYALGIA: ICD-10-CM

## 2023-07-26 DIAGNOSIS — M79.672 PAIN IN BOTH FEET: Primary | ICD-10-CM

## 2023-07-26 DIAGNOSIS — M79.671 PAIN IN BOTH FEET: Primary | ICD-10-CM

## 2023-07-26 DIAGNOSIS — M72.2 PLANTAR FASCIAL FIBROMATOSIS OF BOTH FEET: ICD-10-CM

## 2023-07-26 PROCEDURE — 97113 AQUATIC THERAPY/EXERCISES: CPT

## 2023-07-26 NOTE — PROGRESS NOTES
Daily Note     Today's date: 2023  Patient name: Danielle Bruno  : 1990  MRN: [id number]  Referring provider: Joseph Samayoa PA-C  Dx:   Encounter Diagnosis     ICD-10-CM    1. Pain in both feet  M79.671     M79.672       2. Plantar fascial fibromatosis of both feet  M72.2       3. Fibromyalgia  M79.7                      Subjective: Patient stated "my left foot is feeling a bit better"     Objective: See treatment diary below 1:1 Vandana G PTA      Assessment: Fair tolerance to exercises. Improving with L heel pain. Progress as able       Plan: Continue with Plan. Re-evaluate next session. Precautions: GERD, Migraine, HTN, cervical and lumbar radiculopathy, Ankylosing Spondylitis, TMJ, Dizziness, Chronic Fatigue, FM, Vitamin B12 and D Deficiencies.     Manuals  6/30  7/5 7/6 7/10 7/13 7/17 7/24  7/26              POOL                    HEP: postural correction seated              HEP lumbar extension against counter                            Ther Ex                            Water walking pre and post 4 laps  4 laps  4 laps 4x PRE   Only  4x 4x  4x 4X      Seated hamstring stretch:B: 20 sec 5x 20sec 5x  20 sec x 5 20sec 3x  20sec 5x  20sec 5x  20sec 5x  20SEC 5X       LAQ:B: 20x   20x  2 x 10 20x  20x 20x  20x  20x  20x  20x  20x   Hip flexion:B: 20x 20x  2 x 10 20x  20x 20x  20x  20x  20x  20x  20x   Seated postural correction slough over correct 20x 20x  2 x 10 20x  20x 20x  20x  NT 20x  20x  20x   Cervical spine retraction 20x 2sec  20x 3sec  2 x 10 20x 2sec  20x 3sec  20x 3sec  20x 3sec  3 sec x 20 15x 2sec  13x 2sec  20x 2sec   UT stretch:B: 20x 2sec  20x 3sec  20 sec x 5 20sec 5x  20sec 5x  [id number]  20sec 5x 20 sec x 5 20sec 5x  20sec 5x  20sec 5x   LS stretch:B: 20sec 5x  20sec 5x  20 sec x 5 20sec 5x  20sec 5x  20sec 5x  20sec 5x  20 sec x 5 20sec 5x 20sec 5x  20sec 5x   Standing B curls     20x           Standing scapular squeezes 20x 3sec  20x 3sec  3 sec x 20 20x 3sec  20x 3sec  20x 3sec  20x 3sec  3 sec x 20 15x 3sec  13x 3sec  10x 3 sec                  Standing slr x 3:B: 20x  20x  2 x 10 20x  20X  20X  20X  2 x 10 20x  20X  20x   Standing hr and tr:B: 20x  20x  2 x 10 20x  20X 20X  20X  2 x 10 20x  20X  20x                 SLS B 10x 10sec 10x 10sec  10 sec x 10 10x 10sec  10X 1S0EC  10x 10sec  10X 10SEC  10 sec x 10 10sec 10x  10SEC 10X  [id number]   Mini squats 20x  HOLD 2 x 10 NT 20X  20X 20X  Hold NT 20X np     tandem stance:B: 10x 10 sec  10x 10sec  NT NT NT NT NT NT NT 10X 10SEC  10x10 sec   Side stepping  6x  6x 6 x  6X 6X 6X 6X  6X  6X  6X  6x     tandem ambulation 6x 6x NT 6X 6X         Forward step ups:B:  NT NT 20x  NT NT NT NT NT NT NT   Standing lumbar spine extension 30x  30x  3 x 10 20X  20x  20x  20X  2 x 10 20X  20X  30x   Paddles rows, horizontal add / abd and shoulder add / abd  NT NT NT NT 2 x 10 NT NT NT NT NT   Pool pony bicycle  5 MIN  5 min 5 MIN  5 MIN  5 MIN  5 MIN  5 MIN  8 MIN  NT NT   Pool pony hang     5 min         Ther Activity                            Gait Training                            Modalities

## 2023-07-31 ENCOUNTER — APPOINTMENT (OUTPATIENT)
Dept: PHYSICAL THERAPY | Age: 33
End: 2023-07-31
Payer: COMMERCIAL

## 2023-08-01 ENCOUNTER — PROCEDURE VISIT (OUTPATIENT)
Dept: NEUROLOGY | Facility: CLINIC | Age: 33
End: 2023-08-01
Payer: COMMERCIAL

## 2023-08-01 ENCOUNTER — OFFICE VISIT (OUTPATIENT)
Dept: URGENT CARE | Facility: CLINIC | Age: 33
End: 2023-08-01
Payer: COMMERCIAL

## 2023-08-01 VITALS
SYSTOLIC BLOOD PRESSURE: 160 MMHG | RESPIRATION RATE: 18 BRPM | BODY MASS INDEX: 46.42 KG/M2 | HEART RATE: 94 BPM | TEMPERATURE: 98.2 F | OXYGEN SATURATION: 98 % | DIASTOLIC BLOOD PRESSURE: 84 MMHG | WEIGHT: 262 LBS | HEIGHT: 63 IN

## 2023-08-01 VITALS
SYSTOLIC BLOOD PRESSURE: 124 MMHG | BODY MASS INDEX: 46.6 KG/M2 | DIASTOLIC BLOOD PRESSURE: 72 MMHG | HEART RATE: 89 BPM | TEMPERATURE: 97.8 F | WEIGHT: 263 LBS | HEIGHT: 63 IN

## 2023-08-01 DIAGNOSIS — N30.01 ACUTE CYSTITIS WITH HEMATURIA: Primary | ICD-10-CM

## 2023-08-01 DIAGNOSIS — R30.0 DYSURIA: ICD-10-CM

## 2023-08-01 DIAGNOSIS — G43.709 CHRONIC MIGRAINE WITHOUT AURA WITHOUT STATUS MIGRAINOSUS, NOT INTRACTABLE: Primary | ICD-10-CM

## 2023-08-01 LAB
SL AMB  POCT GLUCOSE, UA: NORMAL
SL AMB LEUKOCYTE ESTERASE,UA: NORMAL
SL AMB POCT BILIRUBIN,UA: NORMAL
SL AMB POCT BLOOD,UA: NORMAL
SL AMB POCT CLARITY,UA: CLEAR
SL AMB POCT COLOR,UA: YELLOW
SL AMB POCT KETONES,UA: NORMAL
SL AMB POCT NITRITE,UA: NORMAL
SL AMB POCT PH,UA: 6
SL AMB POCT SPECIFIC GRAVITY,UA: 1.02
SL AMB POCT URINE HCG: NORMAL
SL AMB POCT URINE PROTEIN: NORMAL
SL AMB POCT UROBILINOGEN: NORMAL

## 2023-08-01 PROCEDURE — 64615 CHEMODENERV MUSC MIGRAINE: CPT | Performed by: PHYSICIAN ASSISTANT

## 2023-08-01 PROCEDURE — 81002 URINALYSIS NONAUTO W/O SCOPE: CPT | Performed by: NURSE PRACTITIONER

## 2023-08-01 PROCEDURE — 87086 URINE CULTURE/COLONY COUNT: CPT | Performed by: NURSE PRACTITIONER

## 2023-08-01 PROCEDURE — 81025 URINE PREGNANCY TEST: CPT | Performed by: NURSE PRACTITIONER

## 2023-08-01 PROCEDURE — 99213 OFFICE O/P EST LOW 20 MIN: CPT | Performed by: NURSE PRACTITIONER

## 2023-08-01 RX ORDER — NITROFURANTOIN 25; 75 MG/1; MG/1
100 CAPSULE ORAL 2 TIMES DAILY
Qty: 10 CAPSULE | Refills: 0 | Status: SHIPPED | OUTPATIENT
Start: 2023-08-01 | End: 2023-08-06

## 2023-08-01 NOTE — PROGRESS NOTES
[facility]        NAME: Ramon Pedro is a 29 y.o. female  : 1990    MRN: [id number]  DATE: 2023  TIME: 5:43 PM    Assessment and Plan   Acute cystitis with hematuria [N30.01]  1. Acute cystitis with hematuria  nitrofurantoin (MACROBID) 100 mg capsule      2. Dysuria  POCT urine dip    POCT urine HCG    Urine culture            Patient Instructions     Patient Instructions   Take antibiotic as directed. Recommend drinking plenty of fluids including water and cranberry juice. Recommend avoiding caffeine or alcohol. Empty bladder frequently. If you develop any high fever, severe back pain, abdominal pain, nausea, vomiting or any concerning symptoms please return proceed ER. Recommend following up with PCP in 3-5 days        Chief Complaint     Chief Complaint   Patient presents with   • Possible UTI     Started yesterday with odor to her urine, frequent urination. History of Present Illness       Urinary Tract Infection   This is a new problem. The current episode started yesterday. The problem occurs every urination. The problem has been unchanged. The quality of the pain is described as burning. The pain is moderate. There has been no fever. There is no history of pyelonephritis. Associated symptoms include frequency and urgency. Pertinent negatives include no chills, discharge, flank pain, hematuria, hesitancy, nausea, sweats or vomiting. She has tried nothing for the symptoms. The treatment provided no relief. There is no history of kidney stones or recurrent UTIs. Review of Systems   Review of Systems   Constitutional: Negative for chills, diaphoresis, fatigue and fever. Respiratory: Negative for cough, chest tightness and shortness of breath. Cardiovascular: Negative for chest pain and palpitations. Gastrointestinal: Negative for abdominal pain, diarrhea, nausea and vomiting. Genitourinary: Positive for dysuria, frequency and urgency.  Negative for decreased urine volume, difficulty urinating, flank pain, hematuria, hesitancy, pelvic pain, vaginal bleeding, vaginal discharge and vaginal pain. Musculoskeletal: Negative for back pain, joint swelling, myalgias, neck pain and neck stiffness. Skin: Negative for rash. Neurological: Negative for dizziness, weakness, numbness and headaches. Current Medications       Current Outpatient Medications:   •  Aimovig 140 MG/ML SOAJ, Inject 140mg (1 pen) under the skin every 30 days. , Disp: 1 mL, Rfl: 11  •  ALPRAZolam (XANAX) 0.5 mg tablet, Take 1 tablet (0.5 mg total) by mouth 2 (two) times a day as needed for anxiety, Disp: 60 tablet, Rfl: 2  •  buPROPion (WELLBUTRIN XL) 300 mg 24 hr tablet, Take 1 tablet (300 mg total) by mouth daily, Disp: 90 tablet, Rfl: 0  •  Cholecalciferol (Vitamin D3) 50 MCG (2000 UT) capsule, Take 2,000 Units by mouth daily, Disp: , Rfl:   •  cyanocobalamin 1,000 mcg/mL, 1 IM injection every week x 4 weeks, then 1 every month, Disp: 4 mL, Rfl: 2  •  diazepam (VALIUM) 5 mg tablet, Take 1 tablet (5 mg total) by mouth daily at bedtime as needed for sleep or muscle spasms, Disp: 30 tablet, Rfl: 1  •  escitalopram (LEXAPRO) 20 mg tablet, Take 1 tablet (20 mg total) by mouth daily, Disp: 90 tablet, Rfl: 0  •  etonogestrel-ethinyl estradiol (NuvaRing) 0.12-0.015 MG/24HR vaginal ring, Insert ring for 21 days then remove for one week., Disp: 3 each, Rfl: 4  •  FeroSul 325 (65 Fe) MG tablet, Take 1 tablet by mouth in the morning, Disp: , Rfl:   •  indomethacin (INDOCIN) 25 mg capsule, 1-2 tabs BID prn severe headache with food.  Hold toradol., Disp: 30 capsule, Rfl: 0  •  nitrofurantoin (MACROBID) 100 mg capsule, Take 1 capsule (100 mg total) by mouth 2 (two) times a day for 5 days, Disp: 10 capsule, Rfl: 0  •  OLANZapine (ZyPREXA) 2.5 mg tablet, Take 1 tablet (2.5 mg total) by mouth daily Do not take with reglan., Disp: 30 tablet, Rfl: 2  •  onabotulinumtoxin A (BOTOX) 100 units, Inject as directed, Disp: , Rfl:   •  ondansetron (ZOFRAN-ODT) 4 mg disintegrating tablet, Take 1 tablet (4 mg total) by mouth every 6 (six) hours as needed for nausea or vomiting, Disp: 90 tablet, Rfl: 0  •  pantoprazole (PROTONIX) 40 mg tablet, Take 1 tablet (40 mg total) by mouth daily, Disp: 30 tablet, Rfl: 3  •  pramipexole (MIRAPEX) 0.25 mg tablet, Take 1 tablet (0.25 mg total) by mouth daily at bedtime, Disp: 34 tablet, Rfl: 3  •  prazosin (MINIPRESS) 2 mg capsule, Take 1 capsule (2 mg total) by mouth daily at bedtime, Disp: 30 capsule, Rfl: 2  •  Trudhesa 0.725 MG/ACT AERS, 1 spray in each nostril for total dose of 1.45mg, may repeat 1 dose after 1 hour. Max 2 doses per 24 hours and 3 doses per week., Disp: 12 mL, Rfl: 6  •  dicyclomine (BENTYL) 20 mg tablet, Take 1 tablet (20 mg total) by mouth 2 (two) times a day for 7 days, Disp: 14 tablet, Rfl: 0  •  magnesium Oxide (MAG-OX) 400 mg TABS, Take 1 tablet (400 mg total) by mouth daily, Disp: 30 tablet, Rfl: 0  •  methylPREDNISolone 4 MG tablet therapy pack, Use as directed on package (Patient not taking: Reported on 8/1/2023), Disp: 21 tablet, Rfl: 0  •  predniSONE 5 mg tablet, , Disp: , Rfl:   •  Syringe/Needle, Disp, (SYRINGE 3CC/72LE6-2/2") 25G X 1-1/2" 3 ML MISC, Use for B12 IM injections.  (Patient not taking: Reported on 8/1/2023), Disp: 4 each, Rfl: 0    Current Facility-Administered Medications:   •  cyanocobalamin injection 1,000 mcg, 1,000 mcg, Intramuscular, Q30 Days, Halina Manzo DO, 1,000 mcg at 08/03/20 8707  •  cyanocobalamin injection 1,000 mcg, 1,000 mcg, Intramuscular, Q14 Days, Halina Manzo DO, 1,000 mcg at 05/18/20 1146  •  cyanocobalamin injection 1,000 mcg, 1,000 mcg, Intramuscular, Q30 Days, Halina Manzo DO, 1,000 mcg at 09/01/20 1129    Current Allergies     Allergies as of 08/01/2023 - Reviewed 08/01/2023   Allergen Reaction Noted   • Cimzia [certolizumab pegol] Swelling 03/22/2022   • Desvenlafaxine  11/26/2012   • Ixekizumab Vomiting 05/27/2022   • Valproic acid Other (See Comments) 10/18/2017   • Voltaren [diclofenac] Swelling 12/13/2022   • Tizanidine Anxiety 01/20/2021            The following portions of the patient's history were reviewed and updated as appropriate: allergies, current medications, past family history, past medical history, past social history, past surgical history and problem list.     Past Medical History:   Diagnosis Date   • Abnormal Pap smear of cervix    • Allergic    • Anxiety    • Arthritis    • Depression    • Diabetes mellitus ([address])    • Fibromyalgia, primary    • Hypertension    • IBS (irritable bowel syndrome)    • Migraine    • Obesity    • Vitamin B12 deficiency        Past Surgical History:   Procedure Laterality Date   • COLONOSCOPY N/A 5/8/2018    Procedure: COLONOSCOPY;  Surgeon: Lorna Boone MD;  Location: [facility]; Service: Gastroenterology   • MI EXC B9 LESION MRGN XCP SK TG S/N/H/F/G > 4.0CM N/A 7/1/2021    Procedure: EXCISION OF PILAR SCALP CYST X 2 AND RIGHT INNER GROIN NEVVUS;  Surgeon: Ankit Cadet MD;  Location: [address] [place] [address] OR;  Service: Plastics   • MI REPAIR COMPLEX SCALP/ARM/LEG 2.6-7.5 CM N/A 7/1/2021    Procedure: CLOSURE WOUND SCALP X 2 AND RIGHT INNER GROIN;  Surgeon: Ankit Cadet MD;  Location: [address] [place] [address] OR;  Service: Plastics   • TONSILLECTOMY     • WISDOM TOOTH EXTRACTION         Family History   Problem Relation Age of Onset   • Rheum arthritis Mother    • Psoriasis Mother    • Other Mother    • Hypertension Mother    • Diabetes unspecified Mother    • Sjogren's syndrome Mother    • Alcohol abuse Mother    • Drug abuse Mother    • Anxiety disorder Father    • Alcohol abuse Father    • Lung cancer Maternal Grandfather    • Cancer Other         bone   • Diabetes Other    • Other Other         High blood pressure   • Depression Maternal Grandmother          Medications have been verified.         Objective   /84   Pulse 94   Temp 98.2 °F (36.8 °C)   Resp 18   Ht 5' 3" (1.6 m)   Wt 119 kg (262 lb)   LMP  (LMP Unknown)   SpO2 98%   BMI 46.41 kg/m²   No LMP recorded (lmp unknown). (Menstrual status: Birth Control). Physical Exam     Physical Exam  Constitutional:       General: She is not in acute distress. Appearance: Normal appearance. She is well-developed. She is not diaphoretic. Cardiovascular:      Rate and Rhythm: Normal rate and regular rhythm. Heart sounds: Normal heart sounds. Pulmonary:      Effort: Pulmonary effort is normal.      Breath sounds: Normal breath sounds. Abdominal:      General: Bowel sounds are normal. There is no distension. Palpations: Abdomen is soft. Abdomen is not rigid. There is no mass. Tenderness: There is no abdominal tenderness. There is no right CVA tenderness, left CVA tenderness, guarding or rebound. Negative signs include Segura's sign and McBurney's sign. Skin:     General: Skin is warm and dry. Neurological:      Mental Status: She is alert and oriented to person, place, and time.

## 2023-08-01 NOTE — PATIENT INSTRUCTIONS
Take antibiotic as directed. Recommend drinking plenty of fluids including water and cranberry juice. Recommend avoiding caffeine or alcohol. Empty bladder frequently. If you develop any high fever, severe back pain, abdominal pain, nausea, vomiting or any concerning symptoms please return proceed ER.   Recommend following up with PCP in 3-5 days

## 2023-08-01 NOTE — PROGRESS NOTES
Universal Protocol   Consent: Verbal consent obtained. Written consent obtained.   Risks and benefits: risks, benefits and alternatives were discussed  Consent given by: patient  Patient understanding: patient states understanding of the procedure being performed  Patient consent: the patient's understanding of the procedure matches consent given  Procedure consent: procedure consent matches procedure scheduled        Chemodenervation     Date/Time 8/1/2023 10:15 AM     Performed by  Aniya Hernandez PA-C   Authorized by Aniya Hernandez PA-C       Pre-procedure details      Prepped With: Alcohol     Procedure details     Position:  Upright   Botox     Botox Type:  Type A    Brand:  Botox    mL's of Botulinum Toxin:  200    Final Concentration per CC:  100 units    Needle Gauge:  30 G 2.5 inch   Procedures     Botox Procedures: chronic headache      Indications: migraines     Injection Location      Head / Face:  L superior trapezius, R superior trapezius, L superior cervical paraspinal, R superior cervical paraspinal, L , R , procerus, L temporalis, R temporalis, R frontalis, L frontalis, R medial occipitalis and L medial occipitalis    L  injection amount:  5 unit(s)    R  injection amount:  5 unit(s)    L lateral frontalis:  5 unit(s)    R lateral frontalis:  5 unit(s)    L medial frontalis:  5 unit(s)    R medial frontalis:  5 unit(s)    L temporalis injection amount:  20 unit(s)    R temporalis injection amount:  20 unit(s)    Procerus injection amount:  5 unit(s)    L medial occipitalis injection amount:  15 unit(s)    R medial occipitalis injection amount:  15 unit(s)    L superior cervical paraspinal injection amount:  10 unit(s)    R superior cervical paraspinal injection amount:  10 unit(s)    L superior trapezius injection amount:  0 unit(s)    R superior trapezius injection amount:  0 unit(s)   Total Units     Total units used:  200    Total units discarded:  0 Post-procedure details      Chemodenervation:  Chronic migraine    Facial Nerve Location[de-identified]  Bilateral facial nerve    Patient tolerance of procedure: Tolerated well, no immediate complications   Comments      Medically necessary:  - 10 R masseter  - 10 L masseter  - 30 units between the R and L headband region/ scalp  - 25 units between each anterior temporalis  Avoided traps b/l. Cold spray used. Blood pressure 124/72, pulse 89, temperature 97.8 °F (36.6 °C), temperature source Temporal, height 5' 3" (1.6 m), weight 119 kg (263 lb), unknown if currently breastfeeding. The pt gets about 3-4 migraines per month on average lately. She states that a combination of Indocin and Trudhesa work very well for abortive treatment for her migraines. She does not always use them together. She purchased a chapstick for under the nose to block out the smells around her, and it works well. She is very smell sensitive and sometimes this causes a migraine. Recently started Avsola infusion with rheumatology; will continue to see if helpful.

## 2023-08-02 ENCOUNTER — TELEMEDICINE (OUTPATIENT)
Dept: BEHAVIORAL/MENTAL HEALTH CLINIC | Facility: CLINIC | Age: 33
End: 2023-08-02
Payer: COMMERCIAL

## 2023-08-02 ENCOUNTER — APPOINTMENT (OUTPATIENT)
Dept: PHYSICAL THERAPY | Age: 33
End: 2023-08-02
Payer: COMMERCIAL

## 2023-08-02 ENCOUNTER — OFFICE VISIT (OUTPATIENT)
Dept: PHYSICAL THERAPY | Age: 33
End: 2023-08-02
Payer: COMMERCIAL

## 2023-08-02 DIAGNOSIS — M79.671 PAIN IN BOTH FEET: Primary | ICD-10-CM

## 2023-08-02 DIAGNOSIS — M79.672 PAIN IN BOTH FEET: Primary | ICD-10-CM

## 2023-08-02 DIAGNOSIS — M72.2 PLANTAR FASCIAL FIBROMATOSIS OF BOTH FEET: ICD-10-CM

## 2023-08-02 DIAGNOSIS — F33.1 DEPRESSION, MAJOR, RECURRENT, MODERATE (HCC): Primary | Chronic | ICD-10-CM

## 2023-08-02 DIAGNOSIS — F41.1 GENERALIZED ANXIETY DISORDER: Chronic | ICD-10-CM

## 2023-08-02 DIAGNOSIS — M79.7 FIBROMYALGIA: ICD-10-CM

## 2023-08-02 PROCEDURE — 90834 PSYTX W PT 45 MINUTES: CPT | Performed by: SOCIAL WORKER

## 2023-08-02 PROCEDURE — 97113 AQUATIC THERAPY/EXERCISES: CPT

## 2023-08-02 NOTE — PROGRESS NOTES
Daily Note     Today's date: 2023  Patient name: Macey Carrillo  : 1990  MRN: [id number]  Referring provider: Iona Spatz, PA-C  Dx:   Encounter Diagnosis     ICD-10-CM    1. Pain in both feet  M79.671     M79.672       2. Plantar fascial fibromatosis of both feet  M72.2       3. Fibromyalgia  M79.7                      Subjective: Patient stated "I have been in pain since my infusion I am a bit better today" 6/10pain today     Objective: See treatment diary below        Assessment: Fair tolerance without increased symptoms with progression of UE exercises. Decreased pain to 3 10      Plan: Continue with Plan. Re-evaluate next session. Precautions: GERD, Migraine, HTN, cervical and lumbar radiculopathy, Ankylosing Spondylitis, TMJ, Dizziness, Chronic Fatigue, FM, Vitamin B12 and D Deficiencies.     Manuals  6/30  7/5 7/6 7/10 7/13 7/17 7/24  7/26 8/2             POOL                    HEP: postural correction seated              HEP lumbar extension against counter                            Ther Ex                            Water walking pre and post 4 laps  4 laps  4 laps 4x PRE   Only  4x 4x  4x 4X 4x      Seated hamstring stretch:B: 20 sec 5x 20sec 5x  20 sec x 5 20sec 3x  20sec 5x  20sec 5x  20sec 5x  20SEC 5X  20sec 5x      LAQ:B: 20x   20x  2 x 10 20x  20x 20x  20x  20x  20x  20x  20x   Hip flexion:B: 20x 20x  2 x 10 20x  20x 20x  20x  20x  20x  20x  20x   Seated postural correction slough over correct 20x 20x  2 x 10 20x  20x 20x  20x  NT 20x  20x  20x   Cervical spine retraction 20x 2sec  20x 3sec  2 x 10 20x 2sec  20x 3sec  20x 3sec  20x 3sec  3 sec x 20 15x 2sec  13x 2sec  20x 2sec   UT stretch:B: 20x 2sec  20x 3sec  20 sec x 5 20sec 5x  20sec 5x  [id number]  20sec 5x 20 sec x 5 20sec 5x  20sec 5x  20sec 5x   LS stretch:B: 20sec 5x  20sec 5x  20 sec x 5 20sec 5x  20sec 5x  20sec 5x  20sec 5x  20 sec x 5 20sec 5x 20sec 5x  20sec 5x   Standing B curls     20x           Standing scapular squeezes 20x 3sec  20x 3sec  3 sec x 20 20x 3sec  20x 3sec  20x 3sec  20x 3sec  3 sec x 20 15x 3sec  13x 3sec  10x 3 sec                  Standing slr x 3:B: 20x  20x  2 x 10 20x  20X  20X  20X  2 x 10 20x  20X  20x   Standing hr and tr:B: 20x  20x  2 x 10 20x  20X 20X  20X  2 x 10 20x  20X  20x                 SLS B 10x 10sec 10x 10sec  10 sec x 10 10x 10sec  10X 1S0EC  10x 10sec  10X 10SEC  10 sec x 10 10sec 10x  10SEC 10X  [id number]   Mini squats 20x  HOLD 2 x 10 NT 20X  20X 20X  Hold NT 20X np     tandem stance:B: 10x 10 sec  10x 10sec  NT NT NT NT NT NT NT 10X 10SEC  10x10 sec   Side stepping  6x  6x 6 x  6X 6X 6X 6X  6X  6X  6X  6x     tandem ambulation 6x 6x NT 6X 6X         Forward step ups:B:  NT NT 20x  NT NT NT NT NT NT NT   Standing lumbar spine extension 30x  30x  3 x 10 20X  20x  20x  20X  2 x 10 20X  20X  30x   Paddles rows, horizontal add / abd and shoulder add / abd  NT NT NT NT 2 x 10 NT NT 20x  NT NT   Pool pony bicycle  5 MIN  5 min 5 MIN  5 MIN  5 MIN  5 MIN  5 MIN  8 MIN  NT NT   Pool pony hang     5 min         Ther Activity                            Gait Training                            Modalities

## 2023-08-02 NOTE — PSYCH
Virtual Regular Visit    Verification of patient location:    Patient is located at Home in the following state in which I hold an active license [place]    Assessment/Plan:    Problem List Items Addressed This Visit        Other    Generalized anxiety disorder (Chronic)    Depression, major, recurrent, moderate ([address]) - Primary (Chronic)     Goals addressed in session: Goal 1      Reason for visit is   Chief Complaint   Patient presents with   • Virtual Regular Visit     Encounter provider APARNA Reynoso    Provider located at [address] [place] [address] [address]  [address] [address] [phone]    Recent Visits  No visits were found meeting these conditions. Showing recent visits within past 7 days and meeting all other requirements  Today's Visits  Date Type Provider Dept   08/02/23 [address] [place] [address], [facility] [address] today's visits and meeting all other requirements  Future Appointments  No visits were found meeting these conditions. Showing future appointments within next 150 days and meeting all other requirements     The patient was identified by name and date of birth. Zaid Grissom was informed that this is a telemedicine visit and [facility] She agrees to proceed. .  My office door was closed. No one else was in the room. She acknowledged consent and understanding of privacy and security of the video platform. The patient has agreed to participate and understands they can discontinue the visit at any time. Patient is aware this is a billable service. Abimbola Rosario is a 29 y.o. female.     HPI     Past Medical History:   Diagnosis Date   • Abnormal Pap smear of cervix    • Allergic    • Anxiety    • Arthritis    • Depression    • Diabetes mellitus ([address])    • Fibromyalgia, primary    • Hypertension • IBS (irritable bowel syndrome)    • Migraine    • Obesity    • Vitamin B12 deficiency        Past Surgical History:   Procedure Laterality Date   • COLONOSCOPY N/A 5/8/2018    Procedure: COLONOSCOPY;  Surgeon: Dinesh Brown MD;  Location: [facility] GI [facility]; Service: Gastroenterology   • WI EXC B9 LESION MRGN XCP SK TG S/N/H/F/G > 4.0CM N/A 7/1/2021    Procedure: EXCISION OF PILAR SCALP CYST X 2 AND RIGHT INNER GROIN NEVVUS;  Surgeon: Elzbieta Middleton MD;  Location: [address] [place] [address];  Service: Plastics   • WI REPAIR COMPLEX SCALP/ARM/LEG 2.6-7.5 CM N/A 7/1/2021    Procedure: CLOSURE WOUND SCALP X 2 AND RIGHT INNER GROIN;  Surgeon: Elzbieta Middleton MD;  Location: [address] [place] [address];  Service: Plastics   • TONSILLECTOMY     • WISDOM TOOTH EXTRACTION         Current Outpatient Medications   Medication Sig Dispense Refill   • Aimovig 140 MG/ML SOAJ Inject 140mg (1 pen) under the skin every 30 days. 1 mL 11   • ALPRAZolam (XANAX) 0.5 mg tablet Take 1 tablet (0.5 mg total) by mouth 2 (two) times a day as needed for anxiety 60 tablet 2   • buPROPion (WELLBUTRIN XL) 300 mg 24 hr tablet Take 1 tablet (300 mg total) by mouth daily 90 tablet 0   • Cholecalciferol (Vitamin D3) 50 MCG (2000 UT) capsule Take 2,000 Units by mouth daily     • cyanocobalamin 1,000 mcg/mL 1 IM injection every week x 4 weeks, then 1 every month 4 mL 2   • diazepam (VALIUM) 5 mg tablet Take 1 tablet (5 mg total) by mouth daily at bedtime as needed for sleep or muscle spasms 30 tablet 1   • dicyclomine (BENTYL) 20 mg tablet Take 1 tablet (20 mg total) by mouth 2 (two) times a day for 7 days 14 tablet 0   • escitalopram (LEXAPRO) 20 mg tablet Take 1 tablet (20 mg total) by mouth daily 90 tablet 0   • etonogestrel-ethinyl estradiol (NuvaRing) 0.12-0.015 MG/24HR vaginal ring Insert ring for 21 days then remove for one week.  3 each 4   • FeroSul 325 (65 Fe) MG tablet Take 1 tablet by mouth in the morning     • indomethacin (INDOCIN) 25 mg capsule 1-2 tabs BID prn severe headache with food. Hold toradol. 30 capsule 0   • magnesium Oxide (MAG-OX) 400 mg TABS Take 1 tablet (400 mg total) by mouth daily 30 tablet 0   • methylPREDNISolone 4 MG tablet therapy pack Use as directed on package (Patient not taking: Reported on 8/1/2023) 21 tablet 0   • nitrofurantoin (MACROBID) 100 mg capsule Take 1 capsule (100 mg total) by mouth 2 (two) times a day for 5 days 10 capsule 0   • OLANZapine (ZyPREXA) 2.5 mg tablet Take 1 tablet (2.5 mg total) by mouth daily Do not take with reglan. 30 tablet 2   • onabotulinumtoxin A (BOTOX) 100 units Inject as directed     • ondansetron (ZOFRAN-ODT) 4 mg disintegrating tablet Take 1 tablet (4 mg total) by mouth every 6 (six) hours as needed for nausea or vomiting 90 tablet 0   • pantoprazole (PROTONIX) 40 mg tablet Take 1 tablet (40 mg total) by mouth daily 30 tablet 3   • pramipexole (MIRAPEX) 0.25 mg tablet Take 1 tablet (0.25 mg total) by mouth daily at bedtime 34 tablet 3   • prazosin (MINIPRESS) 2 mg capsule Take 1 capsule (2 mg total) by mouth daily at bedtime 30 capsule 2   • predniSONE 5 mg tablet  (Patient not taking: Reported on 8/1/2023)     • Syringe/Needle, Disp, (SYRINGE 3CC/35OH2-4/2") 25G X 1-1/2" 3 ML MISC Use for B12 IM injections. (Patient not taking: Reported on 8/1/2023) 4 each 0   • Trudhesa 0.725 MG/ACT AERS 1 spray in each nostril for total dose of 1.45mg, may repeat 1 dose after 1 hour. Max 2 doses per 24 hours and 3 doses per week.  12 mL 6     Current Facility-Administered Medications   Medication Dose Route Frequency Provider Last Rate Last Admin   • cyanocobalamin injection 1,000 mcg  1,000 mcg Intramuscular Q30 Days Elisabet Montenegro, DO   1,000 mcg at 08/03/20 0413   • cyanocobalamin injection 1,000 mcg  1,000 mcg Intramuscular Q14 Days Elisabet Montenegro, DO   1,000 mcg at 05/18/20 1146   • cyanocobalamin injection 1,000 mcg  1,000 mcg Intramuscular Q30 Days Elisabet Montenegro DO   1,000 mcg at 09/01/20 1129        Allergies   Allergen Reactions   • Cimzia [Certolizumab Pegol] Swelling   • Desvenlafaxine      Other reaction(s): state of confusion   • Ixekizumab Vomiting   • Valproic Acid Other (See Comments)     Other reaction(s): dilated pupils, "schizi"   • Voltaren [Diclofenac] Swelling   • Tizanidine Anxiety       Review of Systems    Video Exam    There were no vitals filed for this visit. Physical Exam     Behavioral Health Psychotherapy Progress Note    Psychotherapy Provided: Individual Psychotherapy     1. Depression, major, recurrent, moderate ([address])        2. Generalized anxiety disorder          Goals addressed in session: Goal 1     DATA: Met with Stephanie for scheduled individual session. She states, "There's a lot going on." Stephanie discussed her physical health issues, her friendships, and family issues. Kandice Jaeger states that she has been participating in physical therapy. She also discussed how her friend did not ask her how she did after her infusion. Kandice Jaeger states that she is attempting to set limits with her friend. She discussed her frustration with her friend's lack of compassion for her physical health issues. She states, "She has gotten so selfish. I don't even know what to do anymore." We discussed her history with friendships and ending them when they no longer meet her expectations. We discussed whether or not she wants to change this pattern. We discussed her tendency to "freeze" in situations where there is conflict/confrontation. We discussed how she can work on skills to maintain her self-respect-- e.g., use of [facility] skills. We continued to discuss the importance of having a few friends, rather than relying heavily on one friend. She states that she has always had friends around her in her life.  We discussed the differences between childhood and adolescent friendships vs adult friendships-- as people have intimate relationships, children, work, other responsibilities, etc. Kandice Jaeger states, "I always feel the need for validation." She states that she needs validation from her mother, which she believes has impacted her decision to not move out. She states that she wants to work on decreasing her strong need for validation from her mother. She states that she is trying to continue to focus on her self-care; however, she identifies that it is much harder to maintain this focus when she feels physically unwell. Nataliia Quintero states that she has an upcoming appointment with a dentist. She has some fears related to seeing dentists, and she will be seeing a brand new dentist. She also states that she continues to attend physical therapy (both aqua and standard physical therapy). During this session, this clinician used the following therapeutic modalities: Client-centered Therapy, Dialectical Behavior Therapy, Mindfulness-based Strategies, Motivational Interviewing, Solution-Focused Therapy and Supportive Psychotherapy    Substance Abuse was not addressed during this session. If the client is diagnosed with a co-occurring substance use disorder, please indicate any changes in the frequency or amount of use: n/a. Stage of change for addressing substance use diagnoses: No substance use/Not applicable    ASSESSMENT:  Wilfred Workman presents with a Euthymic/ normal mood. her affect is Normal range and intensity, which is congruent, with her mood and the content of the session. The client has made progress on their goals. Wilfred Workman presents with a minimal risk of suicide, minimal risk of self-harm, and minimal risk of harm to others. For any risk assessment that surpasses a "low" rating, a safety plan must be developed. A safety plan was indicated: no  If yes, describe in detail n/a    PLAN: Between sessions, Wilfred Workman will read the Interpersonal Effectiveness handouts--focusing on GRACIELA,GIVE,FAST.  At the next session, the therapist will use Client-centered Therapy, Dialectical Behavior Therapy, Mindfulness-based Strategies, Motivational Interviewing, Solution-Focused Therapy and Supportive Psychotherapy to address her mood regulation and relationships. Behavioral Health Treatment Plan and Discharge Planning: Hattie Garcia is aware of and agrees to continue to work on their treatment plan. They have identified and are working toward their discharge goals.  yes    Visit start and stop times:    08/02/23  Start Time: 0903  Stop Time: 0491  Total Visit Time: 52 minutes

## 2023-08-03 LAB — BACTERIA UR CULT: NORMAL

## 2023-08-07 ENCOUNTER — OFFICE VISIT (OUTPATIENT)
Dept: PHYSICAL THERAPY | Age: 33
End: 2023-08-07
Payer: COMMERCIAL

## 2023-08-07 ENCOUNTER — OFFICE VISIT (OUTPATIENT)
Dept: FAMILY MEDICINE CLINIC | Facility: CLINIC | Age: 33
End: 2023-08-07
Payer: COMMERCIAL

## 2023-08-07 VITALS
SYSTOLIC BLOOD PRESSURE: 142 MMHG | HEART RATE: 94 BPM | BODY MASS INDEX: 46.42 KG/M2 | TEMPERATURE: 97.8 F | HEIGHT: 63 IN | DIASTOLIC BLOOD PRESSURE: 88 MMHG | OXYGEN SATURATION: 97 % | WEIGHT: 262 LBS

## 2023-08-07 DIAGNOSIS — M46.90 INFLAMMATORY SPONDYLOPATHY, UNSPECIFIED SPINAL REGION (HCC): Primary | ICD-10-CM

## 2023-08-07 DIAGNOSIS — N30.90 CYSTITIS: ICD-10-CM

## 2023-08-07 DIAGNOSIS — N30.00 ACUTE CYSTITIS WITHOUT HEMATURIA: Primary | ICD-10-CM

## 2023-08-07 DIAGNOSIS — J06.9 URI, ACUTE: ICD-10-CM

## 2023-08-07 DIAGNOSIS — M54.16 LUMBAR RADICULOPATHY: ICD-10-CM

## 2023-08-07 LAB
SL AMB  POCT GLUCOSE, UA: ABNORMAL
SL AMB LEUKOCYTE ESTERASE,UA: 15
SL AMB POCT BILIRUBIN,UA: NEGATIVE
SL AMB POCT BLOOD,UA: ABNORMAL
SL AMB POCT CLARITY,UA: ABNORMAL
SL AMB POCT COLOR,UA: ABNORMAL
SL AMB POCT KETONES,UA: 80
SL AMB POCT NITRITE,UA: NEGATIVE
SL AMB POCT PH,UA: 5
SL AMB POCT SPECIFIC GRAVITY,UA: 1.02
SL AMB POCT URINE PROTEIN: 300
SL AMB POCT UROBILINOGEN: NEGATIVE

## 2023-08-07 PROCEDURE — 97140 MANUAL THERAPY 1/> REGIONS: CPT

## 2023-08-07 PROCEDURE — 97110 THERAPEUTIC EXERCISES: CPT

## 2023-08-07 PROCEDURE — 99214 OFFICE O/P EST MOD 30 MIN: CPT | Performed by: FAMILY MEDICINE

## 2023-08-07 PROCEDURE — 81002 URINALYSIS NONAUTO W/O SCOPE: CPT | Performed by: FAMILY MEDICINE

## 2023-08-07 RX ORDER — LEVOFLOXACIN 500 MG/1
500 TABLET, FILM COATED ORAL EVERY 24 HOURS
Qty: 5 TABLET | Refills: 0 | Status: SHIPPED | OUTPATIENT
Start: 2023-08-07 | End: 2023-08-12

## 2023-08-07 NOTE — PROGRESS NOTES
Assessment/Plan:      Diagnoses and all orders for this visit:    Acute cystitis without hematuria  -     levofloxacin (LEVAQUIN) 500 mg tablet; Take 1 tablet (500 mg total) by mouth every 24 hours for 5 days    Cystitis  -     POCT urine dip    URI, acute        I recommend supportive care fluids and rest.  Follow-up if not a lot better in 3 to 5 days. I recommend supportive care fluids and rest.  Follow-up if not better in 3 to 5 days. Subjective:     Patient ID: Janine Minor is a 29 y.o. female. Patient presents with:  Possible UTI: Patient is complaining of odor, frequency and urgency for about a week. Patient was seen at [facility] now and was prescribed antibiotic which she states its not helping. Generalized Body Aches: Patient is complaining of body aches, tiredness and weakness for about 9 days. LR    She was treated with Macrobid at [facility] now. She finished that but still has symptoms. She complains of body aches weakness. She did have an infusion of Remicade a week or so ago. Review of Systems   Constitutional: Positive for activity change and fatigue. Negative for fever. HENT: Positive for sinus pressure and sinus pain. Respiratory: Negative for cough and shortness of breath. Cardiovascular: Negative. Gastrointestinal: Negative. Genitourinary: Positive for frequency and urgency. Neurological: Positive for headaches. Objective:     Physical Exam  Vitals and nursing note reviewed. Constitutional:       Appearance: She is well-developed. HENT:      Head: Normocephalic and atraumatic. Nose: Congestion present. No rhinorrhea. Mouth/Throat:      Pharynx: Posterior oropharyngeal erythema present. No oropharyngeal exudate. Eyes:      Pupils: Pupils are equal, round, and reactive to light. Neck:      Vascular: No JVD. Cardiovascular:      Rate and Rhythm: Normal rate.    Pulmonary:      Effort: Pulmonary effort is normal.   Abdominal: Palpations: Abdomen is soft. Musculoskeletal:      Cervical back: Normal range of motion. Lymphadenopathy:      Cervical: No cervical adenopathy. Neurological:      Mental Status: She is alert and oriented to person, place, and time.

## 2023-08-07 NOTE — PROGRESS NOTES
Daily Note     Today's date: 2023  Patient name: Teodora Valle  : 1990  MRN: [id number]  Referring provider: Magdalene Shirley DPM  Dx:   Encounter Diagnosis     ICD-10-CM    1. Inflammatory spondylopathy, unspecified spinal region ([address])  M46.90       2. Lumbar radiculopathy  M54.16                      Subjective: Noted increased overall pain all over. Objective: See treatment diary below 1:1 time Jennifer Iqbal DPT     Assessment:  Trial of Ktape at B feet. DSecreased B heel and LB pain after manual work. Progress as able       Plan: Continue per plan of care. Pt given the option to perform aquatic PT vs land-based PT next session, as current plan is for aquatic per pt. Precautions: Fibromyalgia. AS. Access Code: [email or web address]  URL: [email or web address]  Date: 2023  Prepared by: Konrad Allison    Exercises  - Seated Toe Curl  - 1 x daily - 20 reps  - Long Sitting Ankle Pumps  - 1 x daily - 20 reps  - Supine Ankle Inversion and Eversion AROM  - 1 x daily - 20 reps  - Long Sitting Calf Stretch with Strap  - 1 x daily - 5 reps - 20 sec hold  - Long Sitting Plantar Fascia Stretch with Towel  - 1 x daily - 5 reps - 20 sec hold  - Gastroc Stretch on Wall  - 3 x daily - 3 reps - 20 sec hold  - Soleus Stretch on Wall  - 3 x daily - 3 reps - 30 sec hold  - Standing Toe Dorsiflexion Stretch  - 3 x daily - 3 reps - 20 sec hold    Manuals           Calf STM B    PF IASTM B JAB SP 10 min                         Ktape to B heel cord and arch     "I"    MAURO                       Neuro Re-Ed             Short foot             Baldwin              Maintain medial arch with:  Mini-squats  Mini-lunges  SLS  Seated 20x5"    Standing clamshell 10x5"    Mini-squat x10 NT          clamshells   NT          bridges   NT                                    Ther Ex             HEP 10'            Bike/NS  5 min 10 MIN           TB ankle 4-way  Blue x10ea BTB   20X           TB lateral step NT          Slant board stretch  Long-sitting gastroc / soleus stretch c belt 3 x 30sec NT          Seated great toe stretch  3 x 30 sec NT          Prone IR/ER L hip    20x 2sec                        Ther Activity                                       Gait Training                                       Modalities

## 2023-08-08 ENCOUNTER — TELEPHONE (OUTPATIENT)
Dept: OBGYN CLINIC | Facility: MEDICAL CENTER | Age: 33
End: 2023-08-08

## 2023-08-10 ENCOUNTER — HOSPITAL ENCOUNTER (EMERGENCY)
Facility: HOSPITAL | Age: 33
Discharge: HOME | End: 2023-08-10
Attending: EMERGENCY MEDICINE
Payer: COMMERCIAL

## 2023-08-10 ENCOUNTER — APPOINTMENT (OUTPATIENT)
Dept: PHYSICAL THERAPY | Age: 33
End: 2023-08-10
Payer: COMMERCIAL

## 2023-08-10 VITALS
DIASTOLIC BLOOD PRESSURE: 71 MMHG | TEMPERATURE: 99.2 F | RESPIRATION RATE: 18 BRPM | OXYGEN SATURATION: 97 % | HEIGHT: 63 IN | HEART RATE: 92 BPM | SYSTOLIC BLOOD PRESSURE: 120 MMHG | WEIGHT: 264 LBS | BODY MASS INDEX: 46.78 KG/M2

## 2023-08-10 DIAGNOSIS — J06.9 URI (UPPER RESPIRATORY INFECTION): Primary | ICD-10-CM

## 2023-08-10 LAB
FLUAV RNA RESP QL NAA+PROBE: NEGATIVE
FLUBV RNA RESP QL NAA+PROBE: NEGATIVE
RSV RNA RESP QL NAA+PROBE: NEGATIVE
SARS-COV-2 RNA RESP QL NAA+PROBE: NEGATIVE

## 2023-08-10 PROCEDURE — 0241U HB NFCT DS VIR RESP RNA 4 TRGT: CPT | Performed by: EMERGENCY MEDICINE

## 2023-08-10 PROCEDURE — 99283 EMERGENCY DEPT VISIT LOW MDM: CPT

## 2023-08-10 PROCEDURE — 99283 EMERGENCY DEPT VISIT LOW MDM: CPT | Performed by: EMERGENCY MEDICINE

## 2023-08-10 NOTE — ED PROVIDER NOTES
History  Chief Complaint   Patient presents with   • URI     Pt presents to ER from home for reports of body aches, mild cough x2 weeks. Was seen by PCP on Monday but body aches got worse yesterday which prompted her ER visits. HPI    This is a very pleasant, nontoxic-appearing, 76-year-old female with a longstanding history of pression, migraines, myalgia, and inflammatory spondylopathy with a history of lumbar radiculopathy presents with cough, congestion, mild muscle aches over the last week and a half, low-grade temperature but no documented temperature at home, took multiple COVID tests were determined to be negative. Patient is nontoxic-appearing offers no other complaints. Patient denies chest pain, shortness of breath, nausea, vomiting or any recent sick contacts. Prior to Admission Medications   Prescriptions Last Dose Informant Patient Reported? Taking? ALPRAZolam (XANAX) 0.5 mg tablet   No No   Sig: Take 1 tablet (0.5 mg total) by mouth 2 (two) times a day as needed for anxiety   Aimovig 140 MG/ML SOAJ   No No   Sig: Inject 140mg (1 pen) under the skin every 30 days. Cholecalciferol (Vitamin D3) 50 MCG (2000 UT) capsule   Yes No   Sig: Take 2,000 Units by mouth daily   FeroSul 325 (65 Fe) MG tablet   Yes No   Sig: Take 1 tablet by mouth in the morning   OLANZapine (ZyPREXA) 2.5 mg tablet   No No   Sig: Take 1 tablet (2.5 mg total) by mouth daily Do not take with reglan. Syringe/Needle, Disp, (SYRINGE 3CC/72XS2-4/2") 25G X 1-1/2" 3 ML MISC   No No   Sig: Use for B12 IM injections. Patient not taking: Reported on 2023   Trudhesa 0.725 MG/ACT AERS   No No   Si spray in each nostril for total dose of 1.45mg, may repeat 1 dose after 1 hour. Max 2 doses per 24 hours and 3 doses per week.    buPROPion (WELLBUTRIN XL) 300 mg 24 hr tablet   No No   Sig: Take 1 tablet (300 mg total) by mouth daily   cyanocobalamin 1,000 mcg/mL   No No   Si IM injection every week x 4 weeks, then 1 every month   diazepam (VALIUM) 5 mg tablet   No No   Sig: Take 1 tablet (5 mg total) by mouth daily at bedtime as needed for sleep or muscle spasms   dicyclomine (BENTYL) 20 mg tablet   No No   Sig: Take 1 tablet (20 mg total) by mouth 2 (two) times a day for 7 days   escitalopram (LEXAPRO) 20 mg tablet   No No   Sig: Take 1 tablet (20 mg total) by mouth daily   etonogestrel-ethinyl estradiol (NuvaRing) 0.12-0.015 MG/24HR vaginal ring   No No   Sig: Insert ring for 21 days then remove for one week. indomethacin (INDOCIN) 25 mg capsule   No No   Si-2 tabs BID prn severe headache with food. Hold toradol.    levofloxacin (LEVAQUIN) 500 mg tablet   No No   Sig: Take 1 tablet (500 mg total) by mouth every 24 hours for 5 days   magnesium Oxide (MAG-OX) 400 mg TABS   No No   Sig: Take 1 tablet (400 mg total) by mouth daily   methylPREDNISolone 4 MG tablet therapy pack   No No   Sig: Use as directed on package   Patient not taking: Reported on 2023   onabotulinumtoxin A (BOTOX) 100 units  Self Yes No   Sig: Inject as directed   ondansetron (ZOFRAN-ODT) 4 mg disintegrating tablet   No No   Sig: Take 1 tablet (4 mg total) by mouth every 6 (six) hours as needed for nausea or vomiting   pantoprazole (PROTONIX) 40 mg tablet   No No   Sig: Take 1 tablet (40 mg total) by mouth daily   pramipexole (MIRAPEX) 0.25 mg tablet   No No   Sig: Take 1 tablet (0.25 mg total) by mouth daily at bedtime   prazosin (MINIPRESS) 2 mg capsule   No No   Sig: Take 1 capsule (2 mg total) by mouth daily at bedtime   predniSONE 5 mg tablet   Yes No   Patient not taking: Reported on 2023      Facility-Administered Medications Last Administration Doses Remaining   cyanocobalamin injection 1,000 mcg 8/3/2020  8:59 AM    cyanocobalamin injection 1,000 mcg 2020 11:46 AM    cyanocobalamin injection 1,000 mcg 2020 11:29 AM           Past Medical History:   Diagnosis Date   • Abnormal Pap smear of cervix    • Allergic    • Anxiety    • Arthritis    • Depression    • Diabetes mellitus ([address])    • Fibromyalgia, primary    • Hypertension    • IBS (irritable bowel syndrome)    • Migraine    • Obesity    • Vitamin B12 deficiency        Past Surgical History:   Procedure Laterality Date   • COLONOSCOPY N/A 5/8/2018    Procedure: COLONOSCOPY;  Surgeon: Romina Whittington MD;  Location: [facility] GI LAB; Service: Gastroenterology   • NV EXC B9 LESION MRGN XCP SK TG S/N/H/F/G > 4.0CM N/A 7/1/2021    Procedure: EXCISION OF PILAR SCALP CYST X 2 AND RIGHT INNER GROIN NEVVUS;  Surgeon: Magno Canas MD;  Location: [address] [place] [address] OR;  Service: Plastics   • NV REPAIR COMPLEX SCALP/ARM/LEG 2.6-7.5 CM N/A 7/1/2021    Procedure: CLOSURE WOUND SCALP X 2 AND RIGHT INNER GROIN;  Surgeon: Magno Canas MD;  Location: [address] [place] [address] OR;  Service: Plastics   • TONSILLECTOMY     • WISDOM TOOTH EXTRACTION         Family History   Problem Relation Age of Onset   • Rheum arthritis Mother    • Psoriasis Mother    • Other Mother    • Hypertension Mother    • Diabetes unspecified Mother    • Sjogren's syndrome Mother    • Alcohol abuse Mother    • Drug abuse Mother    • Anxiety disorder Father    • Alcohol abuse Father    • Lung cancer Maternal Grandfather    • Cancer Other         bone   • Diabetes Other    • Other Other         High blood pressure   • Depression Maternal Grandmother      I have reviewed and agree with the history as documented.     E-Cigarette/Vaping   • E-Cigarette Use Current Some Day User    • Start Date 7/1/19    • Comments medical marijuana      E-Cigarette/Vaping Substances   • Nicotine No    • THC Yes    • CBD Yes    • Flavoring No    • Other No    • Unknown No      Social History     Tobacco Use   • Smoking status: Never     Passive exposure: Past   • Smokeless tobacco: Never   Vaping Use   • Vaping Use: Some days   • Start date: 7/1/2019   • Substances: THC, CBD   Substance Use Topics   • Alcohol use: Yes     Comment: rarely   • Drug use: Yes     Frequency: 2.0 times per week     Types: Marijuana     Comment: medical marijuana (vape)       Review of Systems   Constitutional: Negative. HENT: Negative. Eyes: Negative. Respiratory: Positive for cough. Cardiovascular: Negative. Negative for chest pain. Gastrointestinal: Negative. Negative for constipation. Endocrine: Negative. Genitourinary: Negative for dysuria. Musculoskeletal: Negative. Skin: Negative. Negative for pallor and rash. Allergic/Immunologic: Negative. Neurological: Negative. Negative for dizziness. Hematological: Negative. Psychiatric/Behavioral: Negative. Physical Exam  Physical Exam  Vitals and nursing note reviewed. Constitutional:       General: She is not in acute distress. Appearance: Normal appearance. She is normal weight. She is not ill-appearing, toxic-appearing or diaphoretic. HENT:      Head: Normocephalic and atraumatic. Right Ear: Tympanic membrane, ear canal and external ear normal.      Left Ear: Tympanic membrane, ear canal and external ear normal.      Nose: Nose normal.      Mouth/Throat:      Mouth: Mucous membranes are moist.      Pharynx: Oropharynx is clear. Eyes:      Extraocular Movements: Extraocular movements intact. Conjunctiva/sclera: Conjunctivae normal.      Pupils: Pupils are equal, round, and reactive to light. Neck:      Vascular: No carotid bruit. Cardiovascular:      Rate and Rhythm: Normal rate and regular rhythm. Pulses: Normal pulses. Heart sounds: Normal heart sounds. Pulmonary:      Effort: Pulmonary effort is normal. No respiratory distress. Breath sounds: Normal breath sounds. No stridor. No rhonchi or rales. Chest:      Chest wall: No tenderness. Abdominal:      General: Abdomen is flat. Bowel sounds are normal.   Musculoskeletal:         General: No swelling, tenderness or deformity. Normal range of motion. Cervical back: Normal range of motion. No rigidity or tenderness. Lymphadenopathy:      Cervical: No cervical adenopathy. Skin:     General: Skin is warm. Capillary Refill: Capillary refill takes less than 2 seconds. Neurological:      General: No focal deficit present. Mental Status: She is alert and oriented to person, place, and time. Mental status is at baseline. Psychiatric:         Mood and Affect: Mood normal.         Behavior: Behavior normal.         Thought Content: Thought content normal.         Judgment: Judgment normal.         Vital Signs  ED Triage Vitals [08/10/23 0730]   Temperature Pulse Respirations Blood Pressure SpO2   99.2 °F (37.3 °C) (!) 106 18 (!) 156/101 97 %      Temp Source Heart Rate Source Patient Position - Orthostatic VS BP Location FiO2 (%)   Tympanic Monitor Sitting Left arm --      Pain Score       6           Vitals:    08/10/23 0730 08/10/23 0842   BP: (!) 156/101 120/71   Pulse: (!) 106 92   Patient Position - Orthostatic VS: Sitting Sitting         Visual Acuity      ED Medications  Medications - No data to display    Diagnostic Studies  Results Reviewed     Procedure Component Value Units Date/Time    FLU/RSV/COVID - if FLU/RSV clinically relevant [[id number]]  (Normal) Collected: 08/10/23 0800    Lab Status: Final result Specimen: Nares from Nose Updated: 08/10/23 0842     SARS-CoV-2 Negative     INFLUENZA A PCR Negative     INFLUENZA B PCR Negative     RSV PCR Negative    Narrative:      FOR PEDIATRIC PATIENTS - copy/paste COVID Guidelines URL to browser: [email or web address]    SARS-CoV-2 assay is a Nucleic Acid Amplification assay intended for the  qualitative detection of nucleic acid from SARS-CoV-2 in nasopharyngeal  swabs. Results are for the presumptive identification of SARS-CoV-2 RNA. Positive results are indicative of infection with SARS-CoV-2, the virus  causing COVID-19, but do not rule out bacterial infection or co-infection  with other viruses. Laboratories within the [facility] and its  territories are required to report all positive results to the appropriate  public health authorities. Negative results do not preclude SARS-CoV-2  infection and should not be used as the sole basis for treatment or other  patient management decisions. Negative results must be combined with  clinical observations, patient history, and epidemiological information. This test has not been FDA cleared or approved. This test has been authorized by FDA under an Emergency Use Authorization  (EUA). This test is only authorized for the duration of time the  declaration that circumstances exist justifying the authorization of the  emergency use of an in vitro diagnostic tests for detection of SARS-CoV-2  virus and/or diagnosis of COVID-19 infection under section 564(b)(1) of  the Act, 21 U. S.C. 045WOH-8(Z)(5), unless the authorization is terminated  or revoked sooner. The test has been validated but independent review by FDA  and CLIA is pending. Test performed using [employer] GeneXpert: This RT-PCR assay targets N2,  a region unique to SARS-CoV-2. A conserved region in the E-gene was chosen  for pan-Sarbecovirus detection which includes SARS-CoV-2. According to CMS-2020-01-R, this platform meets the definition of high-throughput technology. No orders to display              Procedures  Procedures         ED Course  ED Course as of 08/10/23 1248   Thu Aug 10, 2023   7968 Seen and evaluated, orders placed, patient recently had the third infusion of Avsola for her inflammatory spondylopathy, had over a week worth of cough, congestion, muscle aches general fatigue, took 3 home COVID test Derman to be negative, PCP advised patient to come to the emergency department. Patient is well-appearing, afebrile, lungs clear, O2 saturation 97%.   Home COVID test was a antigen test not a PCR test.    Differential diagnosis in this patient is as follows: Reaction to her sent infusion if Avsola versus COVID versus URI versus flu versus RSV as other viral illnesses. Did not does not require any other further evaluation at this time. 5014 Quick COVID severity index score 0, viral testing negative, stable for discharge. SBIRT 22yo+    Flowsheet Row Most Recent Value   Initial Alcohol Screen: US AUDIT-C     1. How often do you have a drink containing alcohol? 0 Filed at: 08/10/2023 0733   2. How many drinks containing alcohol do you have on a typical day you are drinking? 0 Filed at: 08/10/2023 0733   3b. FEMALE Any Age, or MALE 65+: How often do you have 4 or more drinks on one occassion? 0 Filed at: 08/10/2023 0733   Audit-C Score 0 Filed at: 08/10/2023 9422   BRUCE: How many times in the past year have you. .. Used an illegal drug or used a prescription medication for non-medical reasons? Never Filed at: 08/10/2023 1206                    Medical Decision Making  Well-appearing, nontoxic-appearing, 28-year-old female presents with cough, congestion, O2 saturation greater than 97%, viral testing negative, patient stable for discharge. Portions of the record may have been created with voice recognition software. Occasional wrong word or "sound a like" substitutions may have occurred due to the inherent limitations of voice recognition software. Read the chart carefully and recognize, using context, where substitutions have occurred. Counseling: I had a detailed discussion with the patient and/or guardian regarding: the historical points, exam findings, and any diagnostic results supporting the discharge diagnosis, lab results, radiology results, discharge instructions reviewed with patient and/or family/caregiver and understanding was verbalized.  Instructions given to return to the emergency department if symptoms worsen or persist, or if there are any questions or concerns that arise at home.         Disposition  Final diagnoses:   URI (upper respiratory infection)     Time reflects when diagnosis was documented in both MDM as applicable and the Disposition within this note     Time User Action Codes Description Comment    8/10/2023  9:39 AM Génesis John Add [J06.9] URI (upper respiratory infection)       ED Disposition     ED Disposition   Discharge    Condition   Stable    Date/Time   u Aug 10, 2023  9:39 AM    Comment   Tomasz Bad discharge to home/self care. Follow-up Information     Follow up With Specialties Details Why [address], DO Family Medicine   [address]  [phone]            Discharge Medication List as of 8/10/2023  9:39 AM      CONTINUE these medications which have NOT CHANGED    Details   Aimovig 140 MG/ML SOAJ Inject 140mg (1 pen) under the skin every 30 days. , Normal      ALPRAZolam (XANAX) 0.5 mg tablet Take 1 tablet (0.5 mg total) by mouth 2 (two) times a day as needed for anxiety, Starting u 7/13/2023, Normal      buPROPion (WELLBUTRIN XL) 300 mg 24 hr tablet Take 1 tablet (300 mg total) by mouth daily, Starting u 7/13/2023, Normal      Cholecalciferol (Vitamin D3) 50 MCG (2000 UT) capsule Take 2,000 Units by mouth daily, Starting Thu 1/26/2023, Historical Med      cyanocobalamin 1,000 mcg/mL 1 IM injection every week x 4 weeks, then 1 every month, Normal      diazepam (VALIUM) 5 mg tablet Take 1 tablet (5 mg total) by mouth daily at bedtime as needed for sleep or muscle spasms, Starting Mon 5/15/2023, Normal      dicyclomine (BENTYL) 20 mg tablet Take 1 tablet (20 mg total) by mouth 2 (two) times a day for 7 days, Starting Sun 2/19/2023, Until Fri 7/7/2023, Normal      escitalopram (LEXAPRO) 20 mg tablet Take 1 tablet (20 mg total) by mouth daily, Starting Thu 7/13/2023, Normal      etonogestrel-ethinyl estradiol (NuvaRing) 0.12-0.015 MG/24HR vaginal ring Insert ring for 21 days then remove for one week., Normal      FeroSul 325 (65 Fe) MG tablet Take 1 tablet by mouth in the morning, Starting Thu 1/26/2023, Historical Med      indomethacin (INDOCIN) 25 mg capsule 1-2 tabs BID prn severe headache with food. Hold toradol., Normal      levofloxacin (LEVAQUIN) 500 mg tablet Take 1 tablet (500 mg total) by mouth every 24 hours for 5 days, Starting Mon 8/7/2023, Until Sat 8/12/2023, Normal      magnesium Oxide (MAG-OX) 400 mg TABS Take 1 tablet (400 mg total) by mouth daily, Starting Fri 10/28/2022, Until Fri 7/7/2023, Normal      methylPREDNISolone 4 MG tablet therapy pack Use as directed on package, Normal      OLANZapine (ZyPREXA) 2.5 mg tablet Take 1 tablet (2.5 mg total) by mouth daily Do not take with reglan., Starting Thu 7/13/2023, Normal      onabotulinumtoxin A (BOTOX) 100 units Inject as directed, Starting Tue 8/29/2017, Historical Med      ondansetron (ZOFRAN-ODT) 4 mg disintegrating tablet Take 1 tablet (4 mg total) by mouth every 6 (six) hours as needed for nausea or vomiting, Starting Thu 3/30/2023, Normal      pantoprazole (PROTONIX) 40 mg tablet Take 1 tablet (40 mg total) by mouth daily, Starting Mon 6/19/2023, Normal      pramipexole (MIRAPEX) 0.25 mg tablet Take 1 tablet (0.25 mg total) by mouth daily at bedtime, Starting Thu 7/13/2023, Normal      prazosin (MINIPRESS) 2 mg capsule Take 1 capsule (2 mg total) by mouth daily at bedtime, Starting Thu 7/13/2023, Normal      predniSONE 5 mg tablet Starting Wed 6/7/2023, Historical Med      Syringe/Needle, Disp, (SYRINGE 3CC/09VO3-4/2") 25G X 1-1/2" 3 ML MISC Use for B12 IM injections. , Normal      Trudhesa 0.725 MG/ACT AERS 1 spray in each nostril for total dose of 1.45mg, may repeat 1 dose after 1 hour. Max 2 doses per 24 hours and 3 doses per week., Normal             No discharge procedures on file.     PDMP Review       Value Time User    PDMP Reviewed  Yes 7/13/2023  2:29 PM Kane Sender, MD          ED Provider  Electronically Signed by           Marcial Mercado III, DO  08/10/23 1248

## 2023-08-11 ENCOUNTER — OFFICE VISIT (OUTPATIENT)
Dept: PODIATRY | Facility: CLINIC | Age: 33
End: 2023-08-11
Payer: COMMERCIAL

## 2023-08-11 VITALS
DIASTOLIC BLOOD PRESSURE: 81 MMHG | HEART RATE: 92 BPM | SYSTOLIC BLOOD PRESSURE: 145 MMHG | BODY MASS INDEX: 46.6 KG/M2 | HEIGHT: 63 IN | WEIGHT: 263 LBS

## 2023-08-11 DIAGNOSIS — M72.2 PLANTAR FASCIITIS OF LEFT FOOT: Primary | ICD-10-CM

## 2023-08-11 DIAGNOSIS — E53.8 B12 DEFICIENCY: ICD-10-CM

## 2023-08-11 DIAGNOSIS — I10 HYPERTENSION, UNSPECIFIED TYPE: Primary | ICD-10-CM

## 2023-08-11 DIAGNOSIS — M21.6X2 ACQUIRED EQUINUS DEFORMITY OF LEFT FOOT: ICD-10-CM

## 2023-08-11 DIAGNOSIS — M72.2 PLANTAR FASCIITIS OF RIGHT FOOT: ICD-10-CM

## 2023-08-11 PROCEDURE — 99213 OFFICE O/P EST LOW 20 MIN: CPT | Performed by: STUDENT IN AN ORGANIZED HEALTH CARE EDUCATION/TRAINING PROGRAM

## 2023-08-11 RX ORDER — INFLIXIMAB-AXXQ 100 MG/10ML
5 INJECTION, POWDER, LYOPHILIZED, FOR SOLUTION INTRAVENOUS
COMMUNITY

## 2023-08-11 RX ORDER — FERROUS SULFATE 325(65) MG
1 TABLET ORAL DAILY
Qty: 90 TABLET | Refills: 1 | Status: SHIPPED | OUTPATIENT
Start: 2023-08-11

## 2023-08-11 RX ORDER — ACETAMINOPHEN 160 MG
2000 TABLET,DISINTEGRATING ORAL DAILY
Qty: 90 CAPSULE | Refills: 1 | Status: SHIPPED | OUTPATIENT
Start: 2023-08-11

## 2023-08-11 RX ORDER — CYANOCOBALAMIN 1000 UG/ML
INJECTION, SOLUTION INTRAMUSCULAR; SUBCUTANEOUS
Qty: 3 ML | Refills: 3 | Status: SHIPPED | OUTPATIENT
Start: 2023-08-11

## 2023-08-11 NOTE — PROGRESS NOTES
Assessment/Plan:    No problem-specific Assessment & Plan notes found for this encounter. Diagnoses and all orders for this visit:    Plantar fasciitis of left foot    Acquired equinus deformity of left foot    Plantar fasciitis of right foot  -     Ambulatory referral to Physical Therapy; Future    Other orders  -     inFLIXimab-axxq (Avsola) 100 mg SOLR; Inject 5 mg/kg into a catheter in a vein      Plan:     -  Patient was counseled and educated on the condition and the diagnosis. The diagnosis, treatment options and prognosis were discussed in detail.   -Continue formal physical therapy and at home stretching and strengthening exercises for bilateral lower extremities  -Continue sneakers with orthotics power steps as recommended.  -Discussed importance of resting, avoiding barefoot even inside the house, the counter anti-inflammatories as needed for pain control  -Addressed all questions and concerns.  -Return in 6 weeks for follow-up    Subjective:      Patient ID: Migdalia Lipscomb is a 29 y.o. female. 27-year-old female with past medical history as below presents for an evaluation of left foot injection and right foot pain secondary to bilateral plantar fasciitis. Patient reports since the injection her pain has significantly improved but it still there. She is going to physical therapy they have been taping and helping her stretch and strengthen her lower extremity which has been going well. She has noted in her foot pain since going to physical therapy and an injection. She denies any other complaints at this time. The following portions of the patient's history were reviewed and updated as appropriate:   She  has a past medical history of Abnormal Pap smear of cervix, Allergic, Anxiety, Arthritis, Depression, Diabetes mellitus ([address]), Fibromyalgia, primary, Hypertension, IBS (irritable bowel syndrome), Migraine, Obesity, and Vitamin B12 deficiency.   She   Patient Active Problem List Diagnosis Date Noted   • Dizziness 07/11/2023   • Left foot pain 07/11/2023   • Acute non-recurrent maxillary sinusitis 07/11/2023   • Left ear pain 05/15/2023   • Facial pain 05/15/2023   • Nausea 03/30/2023   • Tenderness of left temporomandibular joint 11/14/2022   • Restless leg syndrome 11/11/2022   • Intertrigo 11/11/2022   • TMJ (dislocation of temporomandibular joint) 10/07/2022   • Bilateral temporomandibular joint pain 10/07/2022   • Panic disorder without agoraphobia 09/23/2022   • Intractable chronic migraine without aura and with status migrainosus 08/23/2022   • Prediabetes 08/23/2022   • Disequilibrium 08/03/2022   • Basilar migraine 08/03/2022   • Myofascial muscle pain 08/03/2022   • Physiological tremor 08/03/2022   • Costochondritis 07/21/2022   • Ankylosing spondylitis ([address]) 06/22/2022   • Functional diarrhea 05/23/2022   • Parasitic infection 05/10/2022   • Non-intractable vomiting with nausea 01/14/2022   • Blurred vision 12/08/2021   • Hypertension 12/08/2021   • Hyperinsulinemia 10/20/2021   • Gastroesophageal reflux disease without esophagitis 09/17/2021   • Morbid obesity with BMI of 40.0-44.9, adult ([address]) 08/04/2021   • TMJ pain dysfunction syndrome 06/08/2021   • Lower extremity edema 05/26/2021   • Cystitis 03/17/2021   • Pilar cysts 02/03/2021   • Dysplastic nevi 02/03/2021   • Encephalopathy 12/21/2020   • Arthralgia of multiple sites 11/02/2020   • Chronic low back pain 11/02/2020   • Elevated C-reactive protein 11/02/2020   • Inflammatory spondylopathy ([address]) 11/02/2020   • Knee pain 11/02/2020   • Elevated blood pressure reading 07/13/2020   • Pharyngitis due to Streptococcus species 06/02/2020   • Paresthesias 04/28/2020   • Well adult exam 20/96/6886   • Other complicated headache syndrome 09/10/2019   • Insomnia due to medical condition 09/10/2019   • Chronic heel pain, left 08/28/2019   • Irritant contact dermatitis due to oils 08/19/2019   • Depression, major, recurrent, moderate ([address]) 07/01/2019   • URI (upper respiratory infection) 04/24/2019   • Hand dermatitis 04/12/2019   • Intractable migraine with aura without status migrainosus 01/11/2019   • Sleep disturbance 11/27/2018   • Snoring 11/27/2018   • Fibromyalgia syndrome 11/27/2018   • Obesity (BMI 30-39.9) 11/27/2018   • Upper respiratory infection 11/13/2018   • Dental infection 09/12/2018   • Possible pregnancy 07/26/2018   • Migraine with aura and with status migrainosus 07/23/2018   • Cognitive decline 06/27/2018   • Memory loss 06/15/2018   • Chronic fatigue 05/24/2018   • Alternating constipation and diarrhea 04/17/2018   • Abnormal bowel habits 04/17/2018   • Chronic migraine without aura without status migrainosus, not intractable 03/13/2018   • Anterior chest wall pain 02/22/2018   • Migraine headache 06/22/2017   • Cervical radiculitis 06/10/2016   • Seasonal allergies 03/24/2015   • Lumbar radiculopathy 01/05/2015   • Vitamin B12 deficiency 07/30/2014   • Hyperlipidemia 03/24/2014   • Vitamin D deficiency 02/26/2014   • Generalized anxiety disorder 11/19/2012     She  has a past surgical history that includes Tonsillectomy; [place] tooth extraction; Colonoscopy (N/A, 5/8/2018); pr exc b9 lesion mrgn xcp sk tg s/n/h/f/g > 4.0cm (N/A, 7/1/2021); and pr repair complex scalp/arm/leg 2.6-7.5 cm (N/A, 7/1/2021). .    Review of Systems   All other systems reviewed and are negative. Objective:      /81 (BP Location: Right arm, Patient Position: Sitting, Cuff Size: Extra-Large)   Pulse 92   Ht 5' 3" (1.6 m)   Wt 119 kg (263 lb)   LMP  (LMP Unknown)   BMI 46.59 kg/m²          Physical Exam  Vitals reviewed. Musculoskeletal:      Right foot: Tenderness present. Left foot: Tenderness present. Comments: Mild pain with palpation noted to the bilateral medial plantar calcaneal tubercle at the level of plantar fascia origin. Upon toe extension plantar fascia appears to be very tight.   Patient exhibits equinus deformity with decreased ankle dorsiflexion when knee extended compared to knee flexed.

## 2023-08-14 ENCOUNTER — OFFICE VISIT (OUTPATIENT)
Dept: PHYSICAL THERAPY | Age: 33
End: 2023-08-14
Payer: COMMERCIAL

## 2023-08-14 DIAGNOSIS — M79.7 FIBROMYALGIA: Primary | ICD-10-CM

## 2023-08-14 DIAGNOSIS — M94.0 COSTOCHONDRITIS: ICD-10-CM

## 2023-08-14 PROCEDURE — 97110 THERAPEUTIC EXERCISES: CPT

## 2023-08-14 PROCEDURE — 97140 MANUAL THERAPY 1/> REGIONS: CPT

## 2023-08-14 NOTE — PROGRESS NOTES
Daily Note     Today's date: 2023  Patient name: Casey Woodward  : 1990  MRN: [id number]  Referring provider: Matt Martinez DPM  Dx:   Encounter Diagnosis     ICD-10-CM    1. Fibromyalgia  M79.7       2. Costochondritis  M94.0                      Subjective: Patient states she has about a 2-3/10 pain level. She states the kinesio taping has helped a lot from last session. Objective: See treatment diary below 1:1 time Hernando Pérez DPT     Assessment: HEP reviewed and given, and much relief from manual work today. No increased pain. Plan: Continue with plan. Precautions: Fibromyalgia. AS. Access Code: [email or web address]  URL: [email or web address]  Date: 2023  Prepared by: Yan Cardoso    Exercises  - Seated Toe Curl  - 1 x daily - 20 reps  - Long Sitting Ankle Pumps  - 1 x daily - 20 reps  - Supine Ankle Inversion and Eversion AROM  - 1 x daily - 20 reps  - Long Sitting Calf Stretch with Strap  - 1 x daily - 5 reps - 20 sec hold  - Long Sitting Plantar Fascia Stretch with Towel  - 1 x daily - 5 reps - 20 sec hold  - Gastroc Stretch on Wall  - 3 x daily - 3 reps - 20 sec hold  - Soleus Stretch on Wall  - 3 x daily - 3 reps - 30 sec hold  - Standing Toe Dorsiflexion Stretch  - 3 x daily - 3 reps - 20 sec hold    Manuals          Calf STM B    PF IASTM B JAB SP 10 min  X10'  GF                        Ktape to B heel cord and arch     "I"    MAURO MAURO                      Neuro Re-Ed             Short foot             [place]              Maintain medial arch with:  Mini-squats  Mini-lunges  SLS  Seated 20x5"    Standing clamshell 10x5"    Mini-squat x10 NT          clamshells   NT 5 sec x 10          bridges   NT 5 sec x 20                                    Ther Ex             HEP 10'            Bike/NS  5 min 10 MIN  X 10 min          TB ankle 4-way  Blue x10ea BTB   20X  Blue x 20 each         TB lateral step   NT          Slant board stretch Long-sitting gastroc / soleus stretch c belt 3 x 30sec NT 30 sec x 5         Seated great toe stretch  3 x 30 sec NT 30 sec x 3         Prone IR/ER L hip    20x 2sec  20 x 2 sec                      Ther Activity                                       Gait Training                                       Modalities

## 2023-08-16 ENCOUNTER — OFFICE VISIT (OUTPATIENT)
Dept: PHYSICAL THERAPY | Age: 33
End: 2023-08-16
Payer: COMMERCIAL

## 2023-08-16 DIAGNOSIS — M46.90 INFLAMMATORY SPONDYLOPATHY, UNSPECIFIED SPINAL REGION (HCC): ICD-10-CM

## 2023-08-16 DIAGNOSIS — M94.0 COSTOCHONDRITIS: ICD-10-CM

## 2023-08-16 DIAGNOSIS — M72.2 PLANTAR FASCIAL FIBROMATOSIS OF BOTH FEET: ICD-10-CM

## 2023-08-16 DIAGNOSIS — M79.7 FIBROMYALGIA: Primary | ICD-10-CM

## 2023-08-16 DIAGNOSIS — M79.672 PAIN IN BOTH FEET: ICD-10-CM

## 2023-08-16 DIAGNOSIS — M54.16 LUMBAR RADICULOPATHY: ICD-10-CM

## 2023-08-16 DIAGNOSIS — M79.671 PAIN IN BOTH FEET: ICD-10-CM

## 2023-08-16 PROCEDURE — 97113 AQUATIC THERAPY/EXERCISES: CPT | Performed by: PHYSICAL THERAPIST

## 2023-08-16 NOTE — PROGRESS NOTES
Daily Note     Today's date: 2023  Patient name: Marilou Mcmahan  : 1990  MRN: [id number]  Referring provider: Hardik Gutierrez PA-C  Dx:   Encounter Diagnosis     ICD-10-CM    1. Fibromyalgia  M79.7       2. Costochondritis  M94.0       3. Inflammatory spondylopathy, unspecified spinal region ([address])  M46.90       4. Lumbar radiculopathy  M54.16       5. Pain in both feet  M79.671     M79.672       6. Plantar fascial fibromatosis of both feet  M72.2                      Subjective: Patient reported she has full body pain today, which she feels is due to weather changes. Plus, she noted bilateral feet pain are improved with PT and use of kinesio taping techniques. Patient reported body pain is at 4-5 of 10. Objective: See treatment diary below        Assessment: Patient exhibits short term pain reduction with use of pool based PT treatment consisting of stretching, postural correction, DLS / lumbar stabilization based activities. Patient presents with lumbar spine stabilization as her primary lumbar spine symptom reducing strategy and lumbar spine extension in standing as her directional preference. But, she lacks long term pain reduction due to standing, sitting, and walking based functional activities continuing to aggravation pain and limit her functional mobility. Plan: Continue with Plan. Precautions: GERD, Migraine, HTN, cervical and lumbar radiculopathy, Ankylosing Spondylitis, TMJ, Dizziness, Chronic Fatigue, FM, Vitamin B12 and D Deficiencies.     Manuals  6/30  7/5 7/6 7/10 7/13 7/17 7/24  7/26 8/2 8/16            POOL  pool                  HEP: postural correction seated              HEP lumbar extension against counter                            Ther Ex                            Water walking pre and post 4 laps  4 laps  4 laps 4x PRE   Only  4x 4x  4x 4X 4x  4 x     Seated hamstring stretch:B: 20 sec 5x 20sec 5x  20 sec x 5 20sec 3x  20sec 5x  20sec 5x  20sec 5x  20SEC 5X 20sec 5x  20 sec x 5    LAQ:B: 20x   20x  2 x 10 20x  20x 20x  20x  20x  20x  20x  20x   Hip flexion:B: 20x 20x  2 x 10 20x  20x 20x  20x  20x  20x  20x  20x   Seated postural correction slough over correct 20x 20x  2 x 10 20x  20x 20x  20x  NT 20x  20x  20x   Cervical spine retraction 20x 2sec  20x 3sec  2 x 10 20x 2sec  20x 3sec  20x 3sec  20x 3sec  3 sec x 20 15x 2sec  20 x 2sec  20x 2sec   UT stretch:B: 20x 2sec  20x 3sec  20 sec x 5 20sec 5x  20sec 5x  [id number]  20sec 5x 20 sec x 5 20sec 5x  20sec 5x  20sec 5x   LS stretch:B: 20sec 5x  20sec 5x  20 sec x 5 20sec 5x  20sec 5x  20sec 5x  20sec 5x  20 sec x 5 20sec 5x 20sec 5x  20sec 5x   Standing B curls     20x           Standing scapular squeezes 20x 3sec  20x 3sec  3 sec x 20 20x 3sec  20x 3sec  20x 3sec  20x 3sec  3 sec x 20 15x 3sec  20 x 3sec  10x 3 sec                  Standing slr x 3:B: 20x  20x  2 x 10 20x  20X  20X  20X  2 x 10 20x  20X  20x   Standing hr and tr:B: 20x  20x  2 x 10 20x  20X 20X  20X  2 x 10 20x  20X  20x                 SLS B 10x 10sec 10x 10sec  10 sec x 10 10x 10sec  10X 1S0EC  10x 10sec  10X 10SEC  10 sec x 10 10sec 10x  10SEC 10X  10secx 10   Mini squats 20x  HOLD 2 x 10 NT 20X  20X 20X  Hold NT 20X np     tandem stance:B: 10x 10 sec  10x 10sec  NT NT NT NT NT NT NT 10X 10SEC  10x10 sec   Side stepping  6x  6x 6 x  6X 6X 6X 6X  6X  6X  6X  6x     tandem ambulation 6x 6x NT 6X 6X     6 x     Forward step ups:B:  NT NT 20x  NT NT NT NT NT NT NT   Standing lumbar spine extension 30x  30x  3 x 10 20X  20x  20x  20X  2 x 10 20X  20X  30x   Paddles rows, horizontal add / abd and shoulder add / abd  NT NT NT NT 2 x 10 NT NT 20x  2 x 10 NT   Pool pony bicycle  5 MIN  5 min 5 MIN  5 MIN  5 MIN  5 MIN  5 MIN  8 MIN  NT NT   Pool pony hang     5 min     5 min    Ther Activity                            Gait Training                            Modalities                                         LAND    Manuals 7/18 7/24 8/7 8/14         Calf STM B    PF IASTM B JAB SP 10 min  X10'  GF                        Ktape to B heel cord and arch     "I"    MAURO MAURO                      Neuro Re-Ed             Short foot             [place]              Maintain medial arch with:  Mini-squats  Mini-lunges  SLS  Seated 20x5"    Standing clamshell 10x5"    Mini-squat x10 NT          clamshells   NT 5 sec x 10          bridges   NT 5 sec x 20                                    Ther Ex             HEP 10'            Bike/NS  5 min 10 MIN  X 10 min          TB ankle 4-way  Blue x10ea BTB   20X  Blue x 20 each         TB lateral step   NT          Slant board stretch  Long-sitting gastroc / soleus stretch c belt 3 x 30sec NT 30 sec x 5         Seated great toe stretch  3 x 30 sec NT 30 sec x 3         Prone IR/ER L hip    20x 2sec  20 x 2 sec                      Ther Activity                                       Gait Training                                       Modalities

## 2023-08-18 ENCOUNTER — TELEMEDICINE (OUTPATIENT)
Dept: BEHAVIORAL/MENTAL HEALTH CLINIC | Facility: CLINIC | Age: 33
End: 2023-08-18
Payer: COMMERCIAL

## 2023-08-18 DIAGNOSIS — F41.1 GENERALIZED ANXIETY DISORDER: Chronic | ICD-10-CM

## 2023-08-18 DIAGNOSIS — F33.1 DEPRESSION, MAJOR, RECURRENT, MODERATE (HCC): Primary | Chronic | ICD-10-CM

## 2023-08-18 PROCEDURE — 90834 PSYTX W PT 45 MINUTES: CPT | Performed by: SOCIAL WORKER

## 2023-08-18 NOTE — PSYCH
Virtual Regular Visit    Verification of patient location:    Patient is located at Home in the following state in which I hold an active license [place]    Assessment/Plan:    Problem List Items Addressed This Visit        Other    Generalized anxiety disorder (Chronic)    Depression, major, recurrent, moderate ([address]) - Primary (Chronic)     Goals addressed in session: Goal 1      Reason for visit is   Chief Complaint   Patient presents with   • Virtual Regular Visit        Encounter provider APARNA Presley    Provider located at [address] [place] [id number] [phone]    Recent Visits  No visits were found meeting these conditions. Showing recent visits within past 7 days and meeting all other requirements  Today's Visits  Date Type Provider Dept   08/18/23 [address] [place] [address], [address] [place] [address] today's visits and meeting all other requirements  Future Appointments  No visits were found meeting these conditions. Showing future appointments within next 150 days and meeting all other requirements    The patient was identified by name and date of birth. Daylin Randall was informed that this is a telemedicine visit and that [facility] She agrees to proceed. .  My office door was closed. No one else was in the room. She acknowledged consent and understanding of privacy and security of the video platform. The patient has agreed to participate and understands they can discontinue the visit at any time. Patient is aware this is a billable service. Tayo Boogie is a 29 y.o. female.     HPI     Past Medical History:   Diagnosis Date   • Abnormal Pap smear of cervix    • Allergic    • Anxiety    • Arthritis    • Depression    • Diabetes mellitus ([address])    • Fibromyalgia, primary    • Hypertension • IBS (irritable bowel syndrome)    • Migraine    • Obesity    • Vitamin B12 deficiency        Past Surgical History:   Procedure Laterality Date   • COLONOSCOPY N/A 5/8/2018    Procedure: COLONOSCOPY;  Surgeon: Michelle Hemphill MD;  Location: [facility]; Service: Gastroenterology   • [place] EXC B9 LESION MRGN XCP SK TG S/N/H/F/G > 4.0CM N/A 7/1/2021    Procedure: EXCISION OF PILAR SCALP CYST X 2 AND RIGHT INNER GROIN NEVVUS;  Surgeon: Tg Blackburn MD;  Location: [address] [place] [address] OR;  Service: Plastics   • [place] REPAIR COMPLEX SCALP/ARM/LEG 2.6-7.5 CM N/A 7/1/2021    Procedure: CLOSURE WOUND SCALP X 2 AND RIGHT INNER GROIN;  Surgeon: Tg Blackburn MD;  Location: [address] [place] [address];  Service: Plastics   • TONSILLECTOMY     • WISDOM TOOTH EXTRACTION         Current Outpatient Medications   Medication Sig Dispense Refill   • Aimovig 140 MG/ML SOAJ Inject 140mg (1 pen) under the skin every 30 days. 1 mL 11   • ALPRAZolam (XANAX) 0.5 mg tablet Take 1 tablet (0.5 mg total) by mouth 2 (two) times a day as needed for anxiety 60 tablet 2   • buPROPion (WELLBUTRIN XL) 300 mg 24 hr tablet Take 1 tablet (300 mg total) by mouth daily 90 tablet 0   • Cholecalciferol (Vitamin D3) 50 MCG (2000 UT) capsule Take 1 capsule (2,000 Units total) by mouth daily 90 capsule 1   • cyanocobalamin 1,000 mcg/mL 1 IM injection every month 3 mL 3   • diazepam (VALIUM) 5 mg tablet Take 1 tablet (5 mg total) by mouth daily at bedtime as needed for sleep or muscle spasms 30 tablet 1   • dicyclomine (BENTYL) 20 mg tablet Take 1 tablet (20 mg total) by mouth 2 (two) times a day for 7 days 14 tablet 0   • escitalopram (LEXAPRO) 20 mg tablet Take 1 tablet (20 mg total) by mouth daily 90 tablet 0   • etonogestrel-ethinyl estradiol (NuvaRing) 0.12-0.015 MG/24HR vaginal ring Insert ring for 21 days then remove for one week.  3 each 4   • FeroSul 325 (65 Fe) MG tablet Take 1 tablet (325 mg total) by mouth in the morning 90 tablet 1   • indomethacin (INDOCIN) 25 mg capsule 1-2 tabs BID prn severe headache with food. Hold toradol. 30 capsule 0   • inFLIXimab-axxq (Avsola) 100 mg SOLR Inject 5 mg/kg into a catheter in a vein     • magnesium Oxide (MAG-OX) 400 mg TABS Take 1 tablet (400 mg total) by mouth daily 30 tablet 0   • methylPREDNISolone 4 MG tablet therapy pack Use as directed on package (Patient not taking: Reported on 8/1/2023) 21 tablet 0   • OLANZapine (ZyPREXA) 2.5 mg tablet Take 1 tablet (2.5 mg total) by mouth daily Do not take with reglan. 30 tablet 2   • onabotulinumtoxin A (BOTOX) 100 units Inject as directed     • ondansetron (ZOFRAN-ODT) 4 mg disintegrating tablet Take 1 tablet (4 mg total) by mouth every 6 (six) hours as needed for nausea or vomiting 90 tablet 0   • pantoprazole (PROTONIX) 40 mg tablet Take 1 tablet (40 mg total) by mouth daily 30 tablet 3   • pramipexole (MIRAPEX) 0.25 mg tablet Take 1 tablet (0.25 mg total) by mouth daily at bedtime 34 tablet 3   • prazosin (MINIPRESS) 2 mg capsule Take 1 capsule (2 mg total) by mouth daily at bedtime 30 capsule 2   • predniSONE 5 mg tablet  (Patient not taking: Reported on 8/1/2023)     • Syringe/Needle, Disp, (SYRINGE 3CC/04QG0-5/2") 25G X 1-1/2" 3 ML MISC Use for B12 IM injections. (Patient not taking: Reported on 8/1/2023) 4 each 0   • Trudhesa 0.725 MG/ACT AERS 1 spray in each nostril for total dose of 1.45mg, may repeat 1 dose after 1 hour. Max 2 doses per 24 hours and 3 doses per week.  12 mL 6     Current Facility-Administered Medications   Medication Dose Route Frequency Provider Last Rate Last Admin   • cyanocobalamin injection 1,000 mcg  1,000 mcg Intramuscular Q30 Days Birgit Escobar, DO   1,000 mcg at 08/03/20 8875   • cyanocobalamin injection 1,000 mcg  1,000 mcg Intramuscular Q14 Days Birgit Escobar DO   1,000 mcg at 05/18/20 1146   • cyanocobalamin injection 1,000 mcg  1,000 mcg Intramuscular Q30 Days Birgit Escobar DO   1,000 mcg at 09/01/20 1129        Allergies   Allergen Reactions   • Cimzia [Certolizumab Pegol] Swelling   • Desvenlafaxine      Other reaction(s): state of confusion   • Ixekizumab Vomiting   • Valproic Acid Other (See Comments)     Other reaction(s): dilated pupils, "schizi"   • Voltaren [Diclofenac] Swelling   • Tizanidine Anxiety       Review of Systems    Video Exam    There were no vitals filed for this visit. Physical Exam     Behavioral Health Psychotherapy Progress Note    Psychotherapy Provided: Individual Psychotherapy     1. Depression, major, recurrent, moderate ([address])        2. Generalized anxiety disorder            Goals addressed in session: Goal 1     DATA: Met with Stephanie for scheduled individual session. She reports that she has not been feeling physically well over the past couple weeks, which has had an impact on her overall mental health and mood regulation. Bushra Thurman reports that her grandmother is currently in the hospital and will be getting a blood transfusion. She states that because she has not been feeling physically well, she has not gone to visit her grandmother in the hospital. Bushra Thurman discussed feeling very lonely, as she was not feeling well enough to get out of her home and visit with friends. She discussed her relationship with her best friend and a brief discussion she had with her regarding her need for her to be more available to her. We continue to discuss Stephanie's difficulty with opening herself up to more friends and not relying on one friend for support. Bushra Thurman states that she feels that she is able to help improve her friend's mood, but she feels that her friend does not help to improve her mood. This clinician encouraged Bushra Thurman to find and spend time with people who are supportive of her and give her the love and positive energy she needs. She states that she will try to do this more. She discussed her struggles with changing this behavior, as she is used to having one primary friend to spend her time with.  Bushra Thurman discussed her medical issues, including her physical therapy. This clinician encouraged her to engage in positive self-care and to work on gentle movement, getting outside, and "pushing" herself to do just a bit more every day. Nalini Acevedo is agreeable to working on her self-care routine over the next couple weeks. She will continue to increase her physical activity, as recommended by her medical providers. During this session, this clinician used the following therapeutic modalities: Client-centered Therapy, Dialectical Behavior Therapy, Mindfulness-based Strategies, Motivational Interviewing, Solution-Focused Therapy and Supportive Psychotherapy    Substance Abuse was not addressed during this session. If the client is diagnosed with a co-occurring substance use disorder, please indicate any changes in the frequency or amount of use: n/a. Stage of change for addressing substance use diagnoses: No substance use/Not applicable    ASSESSMENT:  Rylee Graham presents with a somewhat dysthymic mood-- primarily associated with her physical health issues and feelings of loneliness. her affect is Normal range and intensity, which is congruent, with her mood and the content of the session. The client has made progress on their goals. Rylee Graham presents with a minimal risk of suicide, minimal risk of self-harm, and minimal risk of harm to others. For any risk assessment that surpasses a "low" rating, a safety plan must be developed. A safety plan was indicated: no  If yes, describe in detail n/a    PLAN: Between sessions, Rylee Graham will continue to work on improving self-care and her physical activities (within the recommendations of her medical providers). . At the next session, the therapist will use Client-centered Therapy, Dialectical Behavior Therapy, Mindfulness-based Strategies, Motivational Interviewing, Solution-Focused Therapy and Supportive Psychotherapy to address her mood regulation, physical health concerns, and relationship issues. Behavioral Health Treatment Plan and Discharge Planning: Allan Emporia is aware of and agrees to continue to work on their treatment plan. They have identified and are working toward their discharge goals.  yes    Visit start and stop times:    08/18/23  Start Time: 0809  Stop Time: 0160  Total Visit Time: 48 minutes

## 2023-08-23 ENCOUNTER — OFFICE VISIT (OUTPATIENT)
Dept: PHYSICAL THERAPY | Age: 33
End: 2023-08-23
Payer: COMMERCIAL

## 2023-08-23 DIAGNOSIS — M72.2 PLANTAR FASCIAL FIBROMATOSIS OF BOTH FEET: ICD-10-CM

## 2023-08-23 DIAGNOSIS — M79.7 FIBROMYALGIA: Primary | ICD-10-CM

## 2023-08-23 PROCEDURE — 97113 AQUATIC THERAPY/EXERCISES: CPT

## 2023-08-23 NOTE — PROGRESS NOTES
Daily Note     Today's date: 2023  Patient name: Liliam Reyes  : 1990  MRN: [id number]  Referring provider: Isaiah Garza PA-C  Dx:   Encounter Diagnosis     ICD-10-CM    1. Fibromyalgia  M79.7       2. Plantar fascial fibromatosis of both feet  M72.2                  Subjective: Patient reported "I am feeling better, the heel lift and therapy is helping"     Objective: See treatment diary below        Assessment: B rib pain with exercises. Progress as able       Plan: Continue with Plan. Trial ankle weights next session. Precautions: GERD, Migraine, HTN, cervical and lumbar radiculopathy, Ankylosing Spondylitis, TMJ, Dizziness, Chronic Fatigue, FM, Vitamin B12 and D Deficiencies.     Manuals    7/6 7/10 7/13 7/17 7/24  7/26 8/2 8/16 8/23    joy #        POOL  pool pool                 HEP: postural correction seated              HEP lumbar extension against counter                            Ther Ex                            Water walking pre and post 4 laps  4 laps  4 laps 4x PRE   Only  4x 4x  4x 4X 4x  4 x  4x   Seated hamstring stretch:B: 20 sec 5x 20sec 5x  20 sec x 5 20sec 3x  20sec 5x  20sec 5x  20sec 5x  20SEC 5X  20sec 5x  20 sec x 5 20sec 5x    LAQ:B: 20x   20x  2 x 10 20x  20x 20x  20x  20x  20x  20x  20x   Hip flexion:B: 20x 20x  2 x 10 20x  20x 20x  20x  20x  20x  20x  20x   Seated postural correction slough over correct 20x 20x  2 x 10 20x  20x 20x  20x  NT 20x  20x  20x   Cervical spine retraction 20x 2sec  20x 3sec  2 x 10 20x 2sec  20x 3sec  20x 3sec  20x 3sec  3 sec x 20 15x 2sec  20 x 2sec  20x 2sec   UT stretch:B: 20x 2sec  20x 3sec  20 sec x 5 20sec 5x  20sec 5x  [id number]  20sec 5x 20 sec x 5 20sec 5x  20sec 5x  20sec 5x   LS stretch:B: 20sec 5x  20sec 5x  20 sec x 5 20sec 5x  20sec 5x  20sec 5x  20sec 5x  20 sec x 5 20sec 5x 20sec 5x  20sec 5x   Standing B curls     20x           Standing scapular squeezes 20x 3sec  20x 3sec  3 sec x 20 20x 3sec  20x 3sec  20x 3sec  20x 3sec  3 sec x 20 15x 3sec  20 x 3sec  10x 3 sec                  Standing slr x 3:B: 20x  20x  2 x 10 20x  20X  20X  20X  2 x 10 20x  20X  20x   Standing hr and tr:B: 20x  20x  2 x 10 20x  20X 20X  20X  2 x 10 20x  20X  20x                 SLS B 10x 10sec 10x 10sec  10 sec x 10 10x 10sec  10X 1S0EC  10x 10sec  10X 10SEC  10 sec x 10 10sec 10x  10SEC 10X  10secx 10   Mini squats 20x  HOLD 2 x 10 NT 20X  20X 20X  Hold NT 20X np     tandem stance:B: 10x 10 sec  10x 10sec  NT NT NT NT NT NT NT 10X 10SEC  10x10 sec   Side stepping  6x  6x 6 x  6X 6X 6X 6X  6X  6X  6X  6x     tandem ambulation 6x 6x NT 6X 6X     6 x     Forward step ups:B:  NT NT 20x  NT NT NT NT NT NT NT   Standing lumbar spine extension 30x  30x  3 x 10 20X  20x  20x  20X  2 x 10 20X  20X  30x   Paddles rows, horizontal add / abd and shoulder add / abd  Bicep curls   NT NT NT NT 2 x 10 NT NT 20x  2 x 10 20x    Pool pony bicycle  5 MIN  5 min 5 MIN  5 MIN  5 MIN  5 MIN  5 MIN  8 MIN  NT NT   Pool pony hang     5 min     5 min    Ther Activity                            Gait Training                            Modalities                                         LAND    Manuals 7/18 7/24 8/7 8/14         Calf STM B    PF IASTM B JAB SP 10 min  X10'  GF                        Ktape to B heel cord and arch     "I"    MAURO MAURO                      Neuro Re-Ed             Short foot             [place]              Maintain medial arch with:  Mini-squats  Mini-lunges  SLS  Seated 20x5"    Standing clamshell 10x5"    Mini-squat x10 NT          clamshells   NT 5 sec x 10          bridges   NT 5 sec x 20                                    Ther Ex             HEP 10'            Bike/NS  5 min 10 MIN  X 10 min          TB ankle 4-way  Blue x10ea BTB   20X  Blue x 20 each         TB lateral step   NT          Slant board stretch  Long-sitting gastroc / soleus stretch c belt 3 x 30sec NT 30 sec x 5         Seated great toe stretch  3 x 30 sec NT 30 sec x 3 Prone IR/ER L hip    20x 2sec  20 x 2 sec                      Ther Activity                                       Gait Training                                       Modalities

## 2023-08-25 ENCOUNTER — OFFICE VISIT (OUTPATIENT)
Dept: PHYSICAL THERAPY | Age: 33
End: 2023-08-25
Payer: COMMERCIAL

## 2023-08-25 DIAGNOSIS — M54.16 LUMBAR RADICULOPATHY: Primary | ICD-10-CM

## 2023-08-25 DIAGNOSIS — M79.671 PAIN IN BOTH FEET: ICD-10-CM

## 2023-08-25 DIAGNOSIS — M79.672 PAIN IN BOTH FEET: ICD-10-CM

## 2023-08-25 PROCEDURE — 97140 MANUAL THERAPY 1/> REGIONS: CPT

## 2023-08-25 PROCEDURE — 97110 THERAPEUTIC EXERCISES: CPT

## 2023-08-25 NOTE — PROGRESS NOTES
Daily Note     Today's date: 2023  Patient name: Rock Diez  : 1990  MRN: [id number]  Referring provider: Singh Mason DPM  Dx:   Encounter Diagnosis     ICD-10-CM    1. Lumbar radiculopathy  M54.16       2. Pain in both feet  M79.671     M79.672           Start Time: 1000  Stop Time: 1100  Total time in clinic (min): 60 minutesSubjective: Patient reported      Objective: See treatment diary below        Assessment:        Plan: Continue with Plan. Trial ankle weights next session. Precautions: GERD, Migraine, HTN, cervical and lumbar radiculopathy, Ankylosing Spondylitis, TMJ, Dizziness, Chronic Fatigue, FM, Vitamin B12 and D Deficiencies.     Manuals  8/25  7/6 7/10 7/13 7/17 7/24  7/26 8/2 8/16 8/23     Land    See below  Ankle   #       POOL  pool pool                 HEP: postural correction seated              HEP lumbar extension against counter                            Ther Ex                            Water walking pre and post   4 laps  4 laps 4x PRE   Only  4x 4x  4x 4X 4x  4 x  4x   Seated hamstring stretch:B:  20sec 5x  20 sec x 5 20sec 3x  20sec 5x  20sec 5x  20sec 5x  20SEC 5X  20sec 5x  20 sec x 5 20sec 5x    LAQ:B:   20x  2 x 10 20x  20x 20x  20x  20x  20x  20x  20x   Hip flexion:B:  20x  2 x 10 20x  20x 20x  20x  20x  20x  20x  20x   Seated postural correction slough over correct   20x  2 x 10 20x  20x 20x  20x  NT 20x  20x  20x   Cervical spine retraction  20x 3sec  2 x 10 20x 2sec  20x 3sec  20x 3sec  20x 3sec  3 sec x 20 15x 2sec  20 x 2sec  20x 2sec   UT stretch:B:  20x 3sec  20 sec x 5 20sec 5x  20sec 5x  [id number]  20sec 5x 20 sec x 5 20sec 5x  20sec 5x  20sec 5x   LS stretch:B:  20sec 5x  20 sec x 5 20sec 5x  20sec 5x  20sec 5x  20sec 5x  20 sec x 5 20sec 5x 20sec 5x  20sec 5x   Standing B curls     20x           Standing scapular squeezes   20x 3sec  3 sec x 20 20x 3sec  20x 3sec  20x 3sec  20x 3sec  3 sec x 20 15x 3sec  20 x 3sec  10x 3 sec Standing slr x 3:B:  20x  2 x 10 20x  20X  20X  20X  2 x 10 20x  20X  20x   Standing hr and tr:B:  20x  2 x 10 20x  20X 20X  20X  2 x 10 20x  20X  20x                 SLS B  10x 10sec  10 sec x 10 10x 10sec  10X 1S0EC  10x 10sec  10X 10SEC  10 sec x 10 10sec 10x  10SEC 10X  10secx 10   Mini squats   HOLD 2 x 10 NT 20X  20X 20X  Hold NT 20X np     tandem stance:B:  10x 10sec  NT NT NT NT NT NT NT 10X 10SEC  10x10 sec   Side stepping    6x 6 x  6X 6X 6X 6X  6X  6X  6X  6x     tandem ambulation  6x NT 6X 6X     6 x     Forward step ups:B:  NT NT 20x  NT NT NT NT NT NT NT   Standing lumbar spine extension   30x  3 x 10 20X  20x  20x  20X  2 x 10 20X  20X  30x   Paddles rows, horizontal add / abd and shoulder add / abd  Bicep curls   NT NT NT NT 2 x 10 NT NT 20x  2 x 10 20x    Pool pony bicycle  5 MIN  5 min 5 MIN  5 MIN  5 MIN  5 MIN  5 MIN  8 MIN  NT NT   Pool pony hang     5 min     5 min    Ther Activity                            Gait Training                            Modalities                                         LAND    Manuals 7/18 7/24 8/7 8/14 8/25        Calf STM B    PF IASTM B JAB SP 10 min  X10'  GF  10 min                       Ktape to B heel cord and arch     "I"    MAURO MAURO                      Neuro Re-Ed             Short foot     NT        [place]      NT        Maintain medial arch with:  Mini-squats  Mini-lunges  SLS  Seated 20x5"    Standing clamshell 10x5"    Mini-squat x10 NT  NT        clamshells   NT 5 sec x 10  NT        bridges   NT 5 sec x 20  NT                                  Ther Ex             HEP 10'            Bike/NS  5 min 10 MIN  X 10 min  10 MIN        TB ankle 4-way  Blue x10ea BTB   20X  Blue x 20 each Grey   TB     20X         TB lateral step   NT  NT        Slant board stretch  Long-sitting gastroc / soleus stretch c belt 3 x 30sec NT 30 sec x 5 20SEC 5X         Seated great toe stretch  3 x 30 sec NT 30 sec x 3 NT        Pro st B              BAPS B level 2 Prone IR/ER L hip    20x 2sec  20 x 2 sec NT                     Ther Activity                                       Gait Training                                       Modalities

## 2023-08-28 ENCOUNTER — OFFICE VISIT (OUTPATIENT)
Dept: PHYSICAL THERAPY | Age: 33
End: 2023-08-28
Payer: COMMERCIAL

## 2023-08-28 DIAGNOSIS — M94.0 COSTOCHONDRITIS: Primary | ICD-10-CM

## 2023-08-28 DIAGNOSIS — M46.90 INFLAMMATORY SPONDYLOPATHY, UNSPECIFIED SPINAL REGION (HCC): ICD-10-CM

## 2023-08-28 PROCEDURE — 97113 AQUATIC THERAPY/EXERCISES: CPT

## 2023-08-28 NOTE — PROGRESS NOTES
Daily Note     Today's date: 2023  Patient name: Jeannette Robledo  : 1990  MRN: [id number]  Referring provider: Connor Sams PA-C  Dx:   Encounter Diagnosis     ICD-10-CM    1. Costochondritis  M94.0       2. Inflammatory spondylopathy, unspecified spinal region ([address])  M46.90           Start Time: 0800  Stop Time: 0900  Total time in clinic (min): 60 minutesSubjective: Patient reported "I am having a flair up, I am frustrated and in pain" 7/10 pain all over. Objective: See treatment diary below        Assessment: Held exercises due to pain today. Decreased pain to 5/10. Progress as able next session. Plan: Continue with Plan. Trial ankle weights next session if able      Precautions: GERD, Migraine, HTN, cervical and lumbar radiculopathy, Ankylosing Spondylitis, TMJ, Dizziness, Chronic Fatigue, FM, Vitamin B12 and D Deficiencies.     Manuals  8/25 8/28 7/6 7/10 7/13 7/17 7/24  7/26 8/2 8/16 8/23     Land    See below  [facility]                 HEP: postural correction seated              HEP lumbar extension against counter                            Ther Ex                            Water walking pre and post   4 laps  4 laps 4x PRE   Only  4x 4x  4x 4X 4x  4 x  4x   Seated hamstring stretch:B:  20sec 5x  20 sec x 5 20sec 3x  20sec 5x  20sec 5x  20sec 5x  20SEC 5X  20sec 5x  20 sec x 5 20sec 5x    LAQ:B:   20x  2 x 10 20x  20x 20x  20x  20x  20x  20x  20x   Hip flexion:B:  20x  2 x 10 20x  20x 20x  20x  20x  20x  20x  20x   Seated postural correction slough over correct   20x  2 x 10 20x  20x 20x  20x  NT 20x  20x  20x   Cervical spine retraction  20x 3sec  2 x 10 20x 2sec  20x 3sec  20x 3sec  20x 3sec  3 sec x 20 15x 2sec  20 x 2sec  20x 2sec   UT stretch:B:  20x 3sec  20 sec x 5 20sec 5x  20sec 5x  [id number]  20sec 5x 20 sec x 5 20sec 5x  20sec 5x  20sec 5x   LS stretch:B:  20sec 5x  20 sec x 5 20sec 5x  20sec 5x  20sec 5x  20sec 5x  20 sec x 5 20sec 5x 20sec 5x  20sec 5x Standing B curls     20x           Standing scapular squeezes   20x 3sec  3 sec x 20 20x 3sec  20x 3sec  20x 3sec  20x 3sec  3 sec x 20 15x 3sec  20 x 3sec  10x 3 sec                  Standing slr x 3:B:  20x  2 x 10 20x  20X  20X  20X  2 x 10 20x  20X  20x   Standing hr and tr:B:  20x  2 x 10 20x  20X 20X  20X  2 x 10 20x  20X  20x                 SLS B  NT 10 sec x 10 10x 10sec  10X 1S0EC  10x 10sec  10X 10SEC  10 sec x 10 10sec 10x  10SEC 10X  10secx 10   Mini squats   NT 2 x 10 NT 20X  20X 20X  Hold NT 20X np     tandem stance:B:  NT NT NT NT NT NT NT NT 10X 10SEC  10x10 sec   Side stepping    6x 6 x  6X 6X 6X 6X  6X  6X  6X  6x     tandem ambulation  6x NT 6X 6X     6 x     Forward step ups:B:  NT NT 20x  NT NT NT NT NT NT NT   Standing lumbar spine extension   30x  3 x 10 20X  20x  20x  20X  2 x 10 20X  20X  30x   Paddles rows, horizontal add / abd and shoulder add / abd  Bicep curls   NT NT NT NT 2 x 10 NT NT 20x  2 x 10 20x    Pool pony bicycle  5 MIN  5 min 5 MIN  5 MIN  5 MIN  5 MIN  5 MIN  8 MIN  NT NT   Pool pony hang  NT   5 min     5 min    Ther Activity                            Gait Training                            Modalities                                         LAND    Manuals 7/18 7/24 8/7 8/14 8/25        Calf STM B    PF IASTM B JAB SP 10 min  X10'  GF  10 min                       Ktape to B heel cord and arch     "I"    MAURO MAURO                      Neuro Re-Ed             Short foot     NT        [place]      NT        Maintain medial arch with:  Mini-squats  Mini-lunges  SLS  Seated 20x5"    Standing clamshell 10x5"    Mini-squat x10 NT  NT        clamshells   NT 5 sec x 10  NT        bridges   NT 5 sec x 20  NT                                  Ther Ex             HEP 10'            Bike/NS  5 min 10 MIN  X 10 min  10 MIN        TB ankle 4-way  Blue x10ea BTB   20X  Blue x 20 each Grey   TB     20X         TB lateral step   NT  NT        Slant board stretch  Long-sitting gastroc / soleus stretch c belt 3 x 30sec NT 30 sec x 5 20SEC 5X         Seated great toe stretch  3 x 30 sec NT 30 sec x 3 NT        Pro st B              BAPS B level 2                                         Prone IR/ER L hip    20x 2sec  20 x 2 sec NT                     Ther Activity                                       Gait Training                                       Modalities

## 2023-08-29 ENCOUNTER — OFFICE VISIT (OUTPATIENT)
Dept: FAMILY MEDICINE CLINIC | Facility: CLINIC | Age: 33
End: 2023-08-29
Payer: COMMERCIAL

## 2023-08-29 VITALS
BODY MASS INDEX: 47.34 KG/M2 | WEIGHT: 267.2 LBS | HEART RATE: 93 BPM | OXYGEN SATURATION: 98 % | HEIGHT: 63 IN | DIASTOLIC BLOOD PRESSURE: 82 MMHG | SYSTOLIC BLOOD PRESSURE: 126 MMHG | TEMPERATURE: 98.5 F

## 2023-08-29 DIAGNOSIS — J01.00 ACUTE NON-RECURRENT MAXILLARY SINUSITIS: Primary | ICD-10-CM

## 2023-08-29 DIAGNOSIS — M45.0 ANKYLOSING SPONDYLITIS OF MULTIPLE SITES IN SPINE (HCC): ICD-10-CM

## 2023-08-29 PROCEDURE — 99213 OFFICE O/P EST LOW 20 MIN: CPT | Performed by: FAMILY MEDICINE

## 2023-08-29 RX ORDER — CEFDINIR 300 MG/1
300 CAPSULE ORAL EVERY 12 HOURS SCHEDULED
Qty: 20 CAPSULE | Refills: 0 | Status: SHIPPED | OUTPATIENT
Start: 2023-08-29 | End: 2023-09-08

## 2023-08-29 NOTE — TELEPHONE ENCOUNTER
[address]- spoke with Sissy Smith- she states the patient's Botox order is ready to be set up delivery  They do not have the patient's consent on file  Patient will need to contact [facility] to give consent to ship  Once patient gives consent they will call us to set up delivery  Will contact the patient today 
Called [facility]- spoke with [place]- he states the patient's profile has been successfully built  I made him aware I need to call in a verbal Rx for Botox  He was able to get me over to a pharmacist to provide a script  Spoke with Him, pharmacist- gave a verbal Rx for Botox 200 units QTY: 1 under [facility] with 3 refills for chronic migraine  He is aware that I need this by the end of the week due to the patient having an appointment on 10/2/2019  Will do a status check in 2 days 
Called [facility]- spoke with Mychal Barron- she states the patient has called to give consent to ship  Botox order is ready to schedule  Botox to be delivered on Friday 9/27/2019 to the [facility] location- a signature will be required  Harley,    Please await Botox delivery and document once it has arrived  Please let me know if you do not receive the patient's Botox order      Thank you,    Sophia Parada
Called [facility] and spoke to Fela - she informed me that this is a new pt  provided her with pt's demographic and insurance information to create a new profile for the pt  She informed me that it takes 24-48 hours to create a new profile for the pt, and the prescription can only be called in after the profile has been created   
Called and spoke with the patient- she is aware that her Botox order is ready to be set up for delivery  She is aware that her insurance is now requiring her to go through a specialty pharmacy and that she must call to give consent to ship  I provided the patient with the phone number for [facility]  Patient will call today to take care of this  Will call [facility] around lunch time to coordinate delivery 
Chyna,    Please await Botox delivery- it will be delivered on Friday 9/27/2019 to the [facility] location  Please let me know if you do not receive the patient's Botox order      Thank you,    Candice Santiago
I am not in the [facility] office today, I am in [facility]!   Thanks  Harley
Noted thank you
PA request received from [facility]  PA forms filled out via Cover my Meds  Authorization was approved  Will await approval letter via fax  Outcome  Approvedtoday  CaseId:[id number];Status:Approved; Review Type:Prior Auth; Coverage Start Date:08/25/2019; Coverage End Date:09/23/2020;
Patient is scheduled with Caleb Mann on 10/2/2019 in the [facility] 
Pts 200 units of Botox arrived today in [facility] via [address]  I placed in fridge and logged in book, will e-mail you the shipping slip  Thanks!   Harley
See below
Type Date User Summary Attachment   General 09/10/2019  9:44 AM Carlita Cruz Care Coordination  -   Note    Botox- authorization #: [id number]- valid for 4 visits- from 8/5/2019 until 8/4/2020   Please use [facility]      Thank you,     Olu Cornejo
No

## 2023-08-29 NOTE — PROGRESS NOTES
Assessment/Plan: Patient will follow with ENT appropriately. Patient see rheumatology regarding ankylosing spondylitis. Patient was started Claritin for possible allergies. Diagnoses and all orders for this visit:    Acute non-recurrent maxillary sinusitis  -     cefdinir (OMNICEF) 300 mg capsule; Take 1 capsule (300 mg total) by mouth every 12 (twelve) hours for 10 days    Ankylosing spondylitis of multiple sites in spine [facility])  -     Ambulatory Referral to Rheumatology; Future            Subjective:        Patient ID: Franc Morelos is a 29 y.o. female. Patient is here with ongoing body aches congestion fever. Patient tested negative for COVID multiple times. Patient was on prednisone by rheumatology. Patient wishes to get another opinion regarding connective tissue disease. URI symptoms have been going on for roughly 1 month. Mild cough noted. No new significant earache or sore throat or headache associated with this. Patient did use Mucinex without significant improvement. The following portions of the patient's history were reviewed and updated as appropriate: allergies, current medications, past family history, past medical history, past social history, past surgical history and problem list.      Review of Systems   Constitutional: Positive for fever. HENT: Positive for congestion and postnasal drip. Eyes: Negative. Respiratory: Positive for cough. Cardiovascular: Negative. Gastrointestinal: Negative. Endocrine: Negative. Genitourinary: Negative. Musculoskeletal: Negative. Skin: Negative. Allergic/Immunologic: Negative. Neurological: Negative. Hematological: Negative. Psychiatric/Behavioral: Negative.             Objective:               /82 (BP Location: Right arm, Patient Position: Sitting, Cuff Size: Large)   Pulse 93   Temp 98.5 °F (36.9 °C) (Temporal)   Ht 5' 3" (1.6 m)   Wt 121 kg (267 lb 3.2 oz)   LMP  (LMP Unknown)   SpO2 98%   BMI 47.33 kg/m²          Physical Exam  Vitals and nursing note reviewed. Constitutional:       General: She is not in acute distress. Appearance: Normal appearance. She is not ill-appearing, toxic-appearing or diaphoretic. HENT:      Head: Normocephalic and atraumatic. Right Ear: Tympanic membrane, ear canal and external ear normal. There is no impacted cerumen. Left Ear: Tympanic membrane, ear canal and external ear normal. There is no impacted cerumen. Nose: Nose normal. No congestion or rhinorrhea. Mouth/Throat:      Mouth: Mucous membranes are moist.      Pharynx: Oropharyngeal exudate present. No posterior oropharyngeal erythema. Eyes:      General: No scleral icterus. Right eye: No discharge. Left eye: No discharge. Extraocular Movements: Extraocular movements intact. Conjunctiva/sclera: Conjunctivae normal.      Pupils: Pupils are equal, round, and reactive to light. Neck:      Vascular: No carotid bruit. Cardiovascular:      Rate and Rhythm: Normal rate and regular rhythm. Pulses: Normal pulses. Heart sounds: Normal heart sounds. No murmur heard. No friction rub. No gallop. Pulmonary:      Effort: Pulmonary effort is normal. No respiratory distress. Breath sounds: Normal breath sounds. No stridor. No wheezing, rhonchi or rales. Chest:      Chest wall: No tenderness. Musculoskeletal:         General: No swelling, tenderness, deformity or signs of injury. Normal range of motion. Cervical back: Normal range of motion and neck supple. No rigidity. No muscular tenderness. Right lower leg: No edema. Left lower leg: No edema. Lymphadenopathy:      Cervical: No cervical adenopathy. Skin:     General: Skin is warm and dry. Capillary Refill: Capillary refill takes less than 2 seconds. Coloration: Skin is not jaundiced. Findings: No bruising, erythema, lesion or rash.    Neurological:      Mental Status: She is alert and oriented to person, place, and time. Mental status is at baseline. Cranial Nerves: No cranial nerve deficit. Sensory: No sensory deficit. Motor: No weakness. Coordination: Coordination normal.      Gait: Gait normal.   Psychiatric:         Mood and Affect: Mood normal.         Behavior: Behavior normal.         Thought Content:  Thought content normal.         Judgment: Judgment normal.

## 2023-08-31 ENCOUNTER — OFFICE VISIT (OUTPATIENT)
Dept: PHYSICAL THERAPY | Age: 33
End: 2023-08-31
Payer: COMMERCIAL

## 2023-08-31 DIAGNOSIS — M72.2 PLANTAR FASCIAL FIBROMATOSIS OF BOTH FEET: ICD-10-CM

## 2023-08-31 DIAGNOSIS — M79.7 FIBROMYALGIA: Primary | ICD-10-CM

## 2023-08-31 PROCEDURE — 97140 MANUAL THERAPY 1/> REGIONS: CPT

## 2023-08-31 PROCEDURE — 97110 THERAPEUTIC EXERCISES: CPT

## 2023-08-31 NOTE — PROGRESS NOTES
Daily Note     Today's date: 2023  Patient name: Katrin Ramirez  : 1990  MRN: [id number]  Referring provider: Lorenzo Alexander DPM  Dx:   Encounter Diagnosis     ICD-10-CM    1. Fibromyalgia  M79.7       2. Plantar fascial fibromatosis of both feet  M72.2                  Subjective: Patient reported "I am tired and in pain today"    Objective: See treatment diary below        Assessment: Pt presented with fatigue on and off flushing with exercises. Decreased heel pain       Plan: Continue with Plan. Trial ankle weights next session if able      Precautions: GERD, Migraine, HTN, cervical and lumbar radiculopathy, Ankylosing Spondylitis, TMJ, Dizziness, Chronic Fatigue, FM, Vitamin B12 and D Deficiencies.     Manuals  8/25 8/28  8/31 7/10 7/13 7/17 7/24  7/26 8/2 8/16 8/23     Land    See below  [facility]      POOL  pool pool                 HEP: postural correction seated              HEP lumbar extension against counter                            Ther Ex                             Water walking pre and post   4 laps   4x PRE   Only  4x 4x  4x 4X 4x  4 x  4x   Seated hamstring stretch:B:  20sec 5x   20sec 3x  20sec 5x  20sec 5x  20sec 5x  20SEC 5X  20sec 5x  20 sec x 5 20sec 5x    LAQ:B:   20x   20x  20x 20x  20x  20x  20x  20x  20x   Hip flexion:B:  20x    20x  20x 20x  20x  20x  20x  20x  20x   Seated postural correction slough over correct   20x   20x  20x 20x  20x  NT 20x  20x  20x   Cervical spine retraction  20x 3sec   20x 2sec  20x 3sec  20x 3sec  20x 3sec  3 sec x 20 15x 2sec  20 x 2sec  20x 2sec   UT stretch:B:  20x 3sec   20sec 5x  20sec 5x  [id number]  20sec 5x 20 sec x 5 20sec 5x  20sec 5x  20sec 5x   LS stretch:B:  20sec 5x   20sec 5x  20sec 5x  20sec 5x  20sec 5x  20 sec x 5 20sec 5x 20sec 5x  20sec 5x   Standing B curls      20x           Standing scapular squeezes   20x 3sec   20x 3sec  20x 3sec  20x 3sec  20x 3sec  3 sec x 20 15x 3sec  20 x 3sec  10x 3 sec                  Standing slr x 3:B:  20x   20x  20X  20X  20X  2 x 10 20x  20X  20x   Standing hr and tr:B:  20x   20x  20X 20X  20X  2 x 10 20x  20X  20x                  SLS B  NT  10x 10sec  10X 1S0EC  10x 10sec  10X 10SEC  10 sec x 10 10sec 10x  10SEC 10X  10secx 10   Mini squats   NT  NT 20X  20X 20X  Hold NT 20X np     tandem stance:B:  NT  NT NT NT NT NT NT 10X 10SEC  10x10 sec   Side stepping    6x  6X 6X 6X 6X  6X  6X  6X  6x     tandem ambulation  6x  6X 6X     6 x     Forward step ups:B:  NT  20x  NT NT NT NT NT NT NT   Standing lumbar spine extension   30x    20X  20x  20x  20X  2 x 10 20X  20X  30x   Paddles rows, horizontal add / abd and shoulder add / abd  Bicep curls   NT  NT NT 2 x 10 NT NT 20x  2 x 10 20x    Pool pony bicycle  5 MIN   5 MIN  5 MIN  5 MIN  5 MIN  5 MIN  8 MIN  NT NT   Pool pony hang  NT   5 min     5 min    Ther Activity                            Gait Training                            Modalities                                         LAND    Manuals 7/18 7/24 8/7 8/14 8/25 8/31       Calf STM B    PF IASTM B JAB SP 10 min  X10'  GF  10 min  10 MIN                      Ktape to B heel cord and arch     "I"    MAURO MAURO  MAURO                     Neuro Re-Ed             Short foot     NT NT       [place]      NT NT       Maintain medial arch with:  Mini-squats  Mini-lunges  SLS  Seated 20x5"    Standing clamshell 10x5"    Mini-squat x10 NT  NT NT       clamshells   NT 5 sec x 10  NT NT       bridges   NT 5 sec x 20  NT NT                                 Ther Ex             HEP 10'            Bike/NS  5 min 10 MIN  X 10 min  10 MIN 10 MI        TB ankle 4-way  Blue x10ea BTB   20X  Blue x 20 each Grey   TB     20X  NT       TB lateral step   NT  NT NT       Slant board stretch  Long-sitting gastroc / soleus stretch c belt 3 x 30sec NT 30 sec x 5 20SEC 5X  NT       Seated great toe stretch  3 x 30 sec NT 30 sec x 3 NT        Pro st B              BAPS B level 2                            Calf st at steps    20sec 5x           HR/TR    20x           Squats    20x           SLS on foam    10x 10sec           Tandem alt B on foam    10x 10sec           Prone IR/ER L hip    20x 2sec  20 x 2 sec NT                     Ther Activity                                       Gait Training                                       Modalities

## 2023-09-01 ENCOUNTER — TELEMEDICINE (OUTPATIENT)
Dept: BEHAVIORAL/MENTAL HEALTH CLINIC | Facility: CLINIC | Age: 33
End: 2023-09-01
Payer: COMMERCIAL

## 2023-09-01 DIAGNOSIS — F33.1 DEPRESSION, MAJOR, RECURRENT, MODERATE (HCC): Primary | Chronic | ICD-10-CM

## 2023-09-01 DIAGNOSIS — F41.1 GENERALIZED ANXIETY DISORDER: Chronic | ICD-10-CM

## 2023-09-01 PROCEDURE — 90834 PSYTX W PT 45 MINUTES: CPT | Performed by: SOCIAL WORKER

## 2023-09-01 NOTE — PSYCH
Virtual Regular Visit    Verification of patient location:    Patient is located at Home in the following state in which I hold an active license [place]    Assessment/Plan:    Problem List Items Addressed This Visit        Other    Generalized anxiety disorder (Chronic)    Depression, major, recurrent, moderate ([address]) - Primary (Chronic)       Goals addressed in session: Goal 1          Reason for visit is   Chief Complaint   Patient presents with   • Virtual Regular Visit        Encounter provider APARNA Negron    Provider located at [address] [place] [id number] [phone]      Recent Visits  Date Type Provider Dept   09/01/23 [address] [place] [address]   08/29/23 Office Visit Kira Das DO [address] [facility] recent visits within past 7 days and meeting all other requirements  Future Appointments  No visits were found meeting these conditions. Showing future appointments within next 150 days and meeting all other requirements       The patient was identified by name and date of birth. Julio Angel was informed that this is a telemedicine visit and that [facility] She agrees to proceed. .  My office door was closed. No one else was in the room. She acknowledged consent and understanding of privacy and security of the video platform. The patient has agreed to participate and understands they can discontinue the visit at any time. Patient is aware this is a billable service. Theodore Torres is a 29 y.o. female.     HPI     Past Medical History:   Diagnosis Date   • Abnormal Pap smear of cervix    • Allergic    • Anxiety    • Arthritis    • Depression    • Diabetes mellitus ([address])    • Fibromyalgia, primary    • Hypertension    • IBS (irritable bowel syndrome)    • Migraine    • Obesity    • Vitamin B12 deficiency        Past Surgical History:   Procedure Laterality Date   • COLONOSCOPY N/A 5/8/2018    Procedure: COLONOSCOPY;  Surgeon: Vipin Zaldivar MD;  Location: [facility] GI LAB; Service: Gastroenterology   • NE EXC B9 LESION MRGN XCP SK TG S/N/H/F/G > 4.0CM N/A 7/1/2021    Procedure: EXCISION OF PILAR SCALP CYST X 2 AND RIGHT INNER GROIN NEVVUS;  Surgeon: Rg Berman MD;  Location: [address] [place] [address] OR;  Service: Plastics   • NE REPAIR COMPLEX SCALP/ARM/LEG 2.6-7.5 CM N/A 7/1/2021    Procedure: CLOSURE WOUND SCALP X 2 AND RIGHT INNER GROIN;  Surgeon: Rg Berman MD;  Location: [address] [place] [address] OR;  Service: Plastics   • TONSILLECTOMY     • WISDOM TOOTH EXTRACTION         Current Outpatient Medications   Medication Sig Dispense Refill   • Aimovig 140 MG/ML SOAJ Inject 140mg (1 pen) under the skin every 30 days. 1 mL 11   • ALPRAZolam (XANAX) 0.5 mg tablet Take 1 tablet (0.5 mg total) by mouth 2 (two) times a day as needed for anxiety 60 tablet 2   • buPROPion (WELLBUTRIN XL) 300 mg 24 hr tablet Take 1 tablet (300 mg total) by mouth daily 90 tablet 0   • cefdinir (OMNICEF) 300 mg capsule Take 1 capsule (300 mg total) by mouth every 12 (twelve) hours for 10 days 20 capsule 0   • Cholecalciferol (Vitamin D3) 50 MCG (2000 UT) capsule Take 1 capsule (2,000 Units total) by mouth daily 90 capsule 1   • cyanocobalamin 1,000 mcg/mL 1 IM injection every month 3 mL 3   • diazepam (VALIUM) 5 mg tablet Take 1 tablet (5 mg total) by mouth daily at bedtime as needed for sleep or muscle spasms 30 tablet 1   • dicyclomine (BENTYL) 20 mg tablet Take 1 tablet (20 mg total) by mouth 2 (two) times a day for 7 days 14 tablet 0   • escitalopram (LEXAPRO) 20 mg tablet Take 1 tablet (20 mg total) by mouth daily 90 tablet 0   • etonogestrel-ethinyl estradiol (NuvaRing) 0.12-0.015 MG/24HR vaginal ring Insert ring for 21 days then remove for one week.  3 each 4   • FeroSul 325 (65 Fe) MG tablet Take 1 tablet (325 mg total) by mouth in the morning 90 tablet 1   • indomethacin (INDOCIN) 25 mg capsule 1-2 tabs BID prn severe headache with food. Hold toradol. 30 capsule 0   • inFLIXimab-axxq (Avsola) 100 mg SOLR Inject 5 mg/kg into a catheter in a vein     • magnesium Oxide (MAG-OX) 400 mg TABS Take 1 tablet (400 mg total) by mouth daily 30 tablet 0   • OLANZapine (ZyPREXA) 2.5 mg tablet Take 1 tablet (2.5 mg total) by mouth daily Do not take with reglan. 30 tablet 2   • onabotulinumtoxin A (BOTOX) 100 units Inject as directed     • ondansetron (ZOFRAN-ODT) 4 mg disintegrating tablet Take 1 tablet (4 mg total) by mouth every 6 (six) hours as needed for nausea or vomiting 90 tablet 0   • pantoprazole (PROTONIX) 40 mg tablet Take 1 tablet (40 mg total) by mouth daily 30 tablet 3   • pramipexole (MIRAPEX) 0.25 mg tablet Take 1 tablet (0.25 mg total) by mouth daily at bedtime 34 tablet 3   • prazosin (MINIPRESS) 2 mg capsule Take 1 capsule (2 mg total) by mouth daily at bedtime 30 capsule 2   • Syringe/Needle, Disp, (SYRINGE 3CC/66LS9-3/2") 25G X 1-1/2" 3 ML MISC Use for B12 IM injections. (Patient not taking: Reported on 8/1/2023) 4 each 0   • Trudhesa 0.725 MG/ACT AERS 1 spray in each nostril for total dose of 1.45mg, may repeat 1 dose after 1 hour. Max 2 doses per 24 hours and 3 doses per week.  12 mL 6     Current Facility-Administered Medications   Medication Dose Route Frequency Provider Last Rate Last Admin   • cyanocobalamin injection 1,000 mcg  1,000 mcg Intramuscular Q30 Days [place] Neth, DO   1,000 mcg at 08/03/20 8580   • cyanocobalamin injection 1,000 mcg  1,000 mcg Intramuscular Q14 Days [place] Neth, DO   1,000 mcg at 05/18/20 1146   • cyanocobalamin injection 1,000 mcg  1,000 mcg Intramuscular Q30 Days [place] Neth, DO   1,000 mcg at 09/01/20 1129        Allergies   Allergen Reactions   • Cimzia [Certolizumab Pegol] Swelling   • Desvenlafaxine      Other reaction(s): state of confusion   • Ixekizumab Vomiting   • Valproic Acid Other (See Comments)     Other reaction(s): dilated pupils, "schizi"   • Voltaren [Diclofenac] Swelling   • Tizanidine Anxiety       Review of Systems    Video Exam    There were no vitals filed for this visit. Physical Exam     Behavioral Health Psychotherapy Progress Note    Psychotherapy Provided: Individual Psychotherapy     1. Depression, major, recurrent, moderate ([address])        2. Generalized anxiety disorder            Goals addressed in session: Goal 1     DATA: Met with Stephanie for scheduled individual session. She states that her old therapist is starting a virtual mindfulness class, and she reached out to Markus to ask her to participate. Markus states that the classes will be held free-of-charge, and she is very excited to take part in this learning process. Markus states that her grandmother has returned home from the hospital and is on iron infusions. Markus discussed her own medical issues. She states that she is considering switching to a [facility] rheumatologist. She states that she is frustrated and feels that some of her doctors have given up on her. Markus states that she has been trying to be more active in her AS and her fibromyalgia group. She states that she wants to learn more about it, so she can learn how to deal with it better. She states that she is trying to do some physical movement and stretches. She states, "My mental health is certainly going downhill again." She states that she is feeling more depressed. She states that she is feeling "trapped" by her medical issues. She denies thoughts of suicide. Markus discussed a recent conversation she had with her friend, Rashaun Barr. She states that she was very honest with her friend about feeling that they are growing apart. She states that her friend states that she believes that it is due to both of them having symptoms of depression. Markus discussed a situation where she feels that her friend was inconsiderate of her feelings. Stephanie attempted to set limits with her; however, she feels that Veronique Quintero is not able think outside of herself. During this session, this clinician used the following therapeutic modalities: Client-centered Therapy, Dialectical Behavior Therapy, Mindfulness-based Strategies, Motivational Interviewing, Solution-Focused Therapy and Supportive Psychotherapy    Substance Abuse was not addressed during this session. If the client is diagnosed with a co-occurring substance use disorder, please indicate any changes in the frequency or amount of use: n/a. Stage of change for addressing substance use diagnoses: No substance use/Not applicable    ASSESSMENT:  Fabián Monroe presents with a Dysthymic mood. her affect is Tearful at times, which is congruent, with her mood and the content of the session. The client has made progress on their goals. Fabián Monroe presents with a minimal risk of suicide, minimal risk of self-harm, and minimal risk of harm to others. For any risk assessment that surpasses a "low" rating, a safety plan must be developed. A safety plan was indicated: no  If yes, describe in detail n/a    PLAN: Between sessions, Fabián Monroe will continue to monitor her moods and will continue to set limits with her friend Veronique Quintero). She will continue to follow up with her medical providers re: her rheumatological issues. At the next session, the therapist will use Client-centered Therapy, Dialectical Behavior Therapy, Mindfulness-based Strategies, Motivational Interviewing, Solution-Focused Therapy and Supportive Psychotherapy to address her mood regulation, health-related issues, and relationship concerns. Behavioral Health Treatment Plan and Discharge Planning: Fabián Monroe is aware of and agrees to continue to work on their treatment plan. They have identified and are working toward their discharge goals.  yes    Visit start and stop times:    09/01/23  Start Time: 0807  Stop Time: 0856  Total Visit Time: 49 minutes

## 2023-09-05 ENCOUNTER — OFFICE VISIT (OUTPATIENT)
Dept: PHYSICAL THERAPY | Age: 33
End: 2023-09-05
Payer: COMMERCIAL

## 2023-09-05 DIAGNOSIS — M54.16 LUMBAR RADICULOPATHY: ICD-10-CM

## 2023-09-05 DIAGNOSIS — M79.672 PAIN IN BOTH FEET: Primary | ICD-10-CM

## 2023-09-05 DIAGNOSIS — M79.671 PAIN IN BOTH FEET: Primary | ICD-10-CM

## 2023-09-05 PROCEDURE — 97113 AQUATIC THERAPY/EXERCISES: CPT

## 2023-09-05 NOTE — PROGRESS NOTES
Daily Note     Today's date: 2023  Patient name: Shauna Norton  : 1990  MRN: [id number]  Referring provider: Gabrielle Morales PA-C  Dx:   Encounter Diagnosis     ICD-10-CM    1. Pain in both feet  M79.671     M79.672       2. Lumbar radiculopathy  M54.16                 Subjective: "I am more tired than anything, both my ankles are sore since the tape came off. I think I have allergies I am congested"     Objective: See treatment diary below        Assessment: Pt tolerated session fairly well, intermittent chest and B ankle discomfort. Plan: Continue with Plan. Trial ankle weights next session if able      Precautions: GERD, Migraine, HTN, cervical and lumbar radiculopathy, Ankylosing Spondylitis, TMJ, Dizziness, Chronic Fatigue, FM, Vitamin B12 and D Deficiencies.     Manuals 9/5 8/28  8/31 7/10 7/13 7/17 7/24  7/26 8/2 8/16 8/23    POOL  Pool  LAND      POOL  pool pool                 HEP: postural correction seated              HEP lumbar extension against counter                            Ther Ex                             Water walking pre and post  4 laps  4 laps   4x PRE   Only  4x 4x  4x 4X 4x  4 x  4x   Seated hamstring stretch:B: 20sec 5x  20sec 5x   20sec 3x  20sec 5x  20sec 5x  20sec 5x  20SEC 5X  20sec 5x  20 sec x 5 20sec 5x    LAQ:B: 20x   20x   20x  20x 20x  20x  20x  20x  20x  20x   Hip flexion:B: 20x  20x    20x  20x 20x  20x  20x  20x  20x  20x   Seated postural correction slough over correct  20x  20x   20x  20x 20x  20x  NT 20x  20x  20x   Cervical spine retraction 20x 3sec  20x 3sec   20x 2sec  20x 3sec  20x 3sec  20x 3sec  3 sec x 20 15x 2sec  20 x 2sec  20x 2sec   UT stretch:B: 20x 3sec 20x 3sec   20sec 5x  20sec 5x  [id number]  20sec 5x 20 sec x 5 20sec 5x  20sec 5x  20sec 5x   LS stretch:B: 20sec 5x  20sec 5x   20sec 5x  20sec 5x  20sec 5x  20sec 5x  20 sec x 5 20sec 5x 20sec 5x  20sec 5x   Standing B curls  20x     20x           Standing scapular squeezes  20x 3sec  20x 3sec   20x 3sec  20x 3sec  20x 3sec  20x 3sec  3 sec x 20 15x 3sec  20 x 3sec  10x 3 sec                  Standing slr x 3:B: 20x  20x   20x  20X  20X  20X  2 x 10 20x  20X  20x   Standing hr and tr:B: 20x  20x   20x  20X 20X  20X  2 x 10 20x  20X  20x                  SLS B 10X 10SEC  NT  10x 10sec  10X 1S0EC  10x 10sec  10X 10SEC  10 sec x 10 10sec 10x  10SEC 10X  10secx 10   Mini squats  20X  NT  NT 20X  20X 20X  Hold NT 20X np     tandem stance:B: 10X 10SEC  NT  NT NT NT NT NT NT 10X 10SEC  10x10 sec   Side stepping    6x  6X 6X 6X 6X  6X  6X  6X  6x     tandem ambulation  6x  6X 6X     6 x     Forward step ups:B: NT NT  20x  NT NT NT NT NT NT NT   Standing lumbar spine extension  20X  30x    20X  20x  20x  20X  2 x 10 20X  20X  30x   Paddles rows, horizontal add / abd and shoulder add / abd  Bicep curls  NT NT  NT NT 2 x 10 NT NT 20x  2 x 10 20x    Pool pony bicycle 5 MIN  5 MIN   5 MIN  5 MIN  5 MIN  5 MIN  5 MIN  8 MIN  NT NT   Pool pony hang 5 MIN  NT   5 min     5 min    Ther Activity                            Gait Training                            Modalities                                         LAND    Manuals 7/18 7/24 8/7 8/14 8/25 8/31       Calf STM B    PF IASTM B JAB SP 10 min  X10'  GF  10 min  10 MIN                      Ktape to B heel cord and arch     "I"    MAURO MAURO  MAURO                     Neuro Re-Ed             Short foot     NT NT       [place]      NT NT       Maintain medial arch with:  Mini-squats  Mini-lunges  SLS  Seated 20x5"    Standing clamshell 10x5"    Mini-squat x10 NT  NT NT       clamshells   NT 5 sec x 10  NT NT       bridges   NT 5 sec x 20  NT NT                                 Ther Ex             HEP 10'            Bike/NS  5 min 10 MIN  X 10 min  10 MIN 10 MI        TB ankle 4-way  Blue x10ea BTB   20X  Blue x 20 each Grey   TB     20X  NT       TB lateral step   NT  NT NT       Slant board stretch  Long-sitting gastroc / soleus stretch c belt 3 x 30sec NT 30 sec x 5 20SEC 5X  NT       Seated great toe stretch  3 x 30 sec NT 30 sec x 3 NT        Pro st B              BAPS B level 2                            Calf st at steps    20sec 5x           HR/TR    20x           Squats    20x           SLS on foam    10x 10sec           Tandem alt B on foam    10x 10sec           Prone IR/ER L hip    20x 2sec  20 x 2 sec NT                     Ther Activity                                       Gait Training                                       Modalities

## 2023-09-08 ENCOUNTER — OFFICE VISIT (OUTPATIENT)
Dept: PHYSICAL THERAPY | Age: 33
End: 2023-09-08
Payer: COMMERCIAL

## 2023-09-08 DIAGNOSIS — M79.672 PAIN IN BOTH FEET: ICD-10-CM

## 2023-09-08 DIAGNOSIS — M54.16 LUMBAR RADICULOPATHY: ICD-10-CM

## 2023-09-08 DIAGNOSIS — M46.90 INFLAMMATORY SPONDYLOPATHY, UNSPECIFIED SPINAL REGION (HCC): Primary | ICD-10-CM

## 2023-09-08 DIAGNOSIS — M79.7 FIBROMYALGIA: ICD-10-CM

## 2023-09-08 DIAGNOSIS — M79.671 PAIN IN BOTH FEET: ICD-10-CM

## 2023-09-08 PROCEDURE — 97110 THERAPEUTIC EXERCISES: CPT

## 2023-09-08 PROCEDURE — 97140 MANUAL THERAPY 1/> REGIONS: CPT

## 2023-09-08 NOTE — PROGRESS NOTES
Daily Note     Today's date: 2023  Patient name: Cinthya Zendejas  : 1990  MRN: [id number]  Referring provider: Azeem Baer DPM  Dx:   Encounter Diagnosis     ICD-10-CM    1. Inflammatory spondylopathy, unspecified spinal region ([address])  M46.90       2. Fibromyalgia  M79.7       3. Lumbar radiculopathy  M54.16       4. Pain in both feet  M79.671     M79.672                 Subjective: "my left ankle has been swollen since yesterday"    Objective: See treatment diary below        Assessment: Challenged with exercises and ROM at L ankle. Progress as able        Plan: Continue with Plan. Trial ankle weights next session if able      Precautions: GERD, Migraine, HTN, cervical and lumbar radiculopathy, Ankylosing Spondylitis, TMJ, Dizziness, Chronic Fatigue, FM, Vitamin B12 and D Deficiencies.     Manuals     POOL  Pool  LAND  LAND     POOL  pool pool                 HEP: postural correction seated              HEP lumbar extension against counter                            Ther Ex                             Water walking pre and post  4 laps  4 laps     4x  4x 4X 4x  4 x  4x   Seated hamstring stretch:B: 20sec 5x  20sec 5x     20sec 5x  20sec 5x  20SEC 5X  20sec 5x  20 sec x 5 20sec 5x    LAQ:B: 20x   20x      20x  20x  20x  20x  20x  20x   Hip flexion:B: 20x  20x      20x  20x  20x  20x  20x  20x   Seated postural correction slough over correct  20x  20x     20x  20x  NT 20x  20x  20x   Cervical spine retraction 20x 3sec  20x 3sec      20x 3sec  20x 3sec  3 sec x 20 15x 2sec  20 x 2sec  20x 2sec   UT stretch:B: 20x 3sec 20x 3sec     [id number]  20sec 5x 20 sec x 5 20sec 5x  20sec 5x  20sec 5x   LS stretch:B: 20sec 5x  20sec 5x     20sec 5x  20sec 5x  20 sec x 5 20sec 5x 20sec 5x  20sec 5x   Standing B curls  20x                Standing scapular squeezes  20x 3sec  20x 3sec     20x 3sec  20x 3sec  3 sec x 20 15x 3sec  20 x 3sec  10x 3 sec Standing slr x 3:B: 20x  20x     20X  20X  2 x 10 20x  20X  20x   Standing hr and tr:B: 20x  20x     20X  20X  2 x 10 20x  20X  20x                  SLS B 10X 10SEC  NT     10x 10sec  10X 10SEC  10 sec x 10 10sec 10x  10SEC 10X  10secx 10   Mini squats  20X  NT    20X 20X  Hold NT 20X np     tandem stance:B: 10X 10SEC  NT    NT NT NT NT 10X 10SEC  10x10 sec   Side stepping    6x     6X 6X  6X  6X  6X  6x     tandem ambulation  6x        6 x     Forward step ups:B: NT NT    NT NT NT NT NT NT   Standing lumbar spine extension  20X  30x      20x  20X  2 x 10 20X  20X  30x   Paddles rows, horizontal add / abd and shoulder add / abd  Bicep curls  NT NT     2 x 10 NT NT 20x  2 x 10 20x    Pool pony bicycle 5 MIN  5 MIN     5 MIN  5 MIN  5 MIN  8 MIN  NT NT   Pool pony hang 5 MIN  NT        5 min    Ther Activity                            Gait Training                            Modalities                                         LAND    Manuals 7/18 7/24 8/7 8/14 8/25 8/31 9/8      Calf STM B    PF IASTM B JAB SP 10 min  X10'  GF  10 min  10 MIN  10 mi                     Ktape to B heel cord and arch     "I"    [address]                   Neuro Re-Ed             Short foot     NT NT NT      [place]      NT NT NT      Maintain medial arch with:  Mini-squats  Mini-lunges  SLS  Seated 20x5"    Standing clamshell 10x5"    Mini-squat x10 NT  NT NT NT      clamshells   NT 5 sec x 10  NT NT NT      bridges   NT 5 sec x 20  NT NT NT      Hm st        20sec 5x       gastroc        20sec 5x       Ther Ex             HEP 10'            Bike/NS  5 min 10 MIN  X 10 min  10 MIN 10 MI  10 MIN       TB ankle 4-way  Blue x10ea BTB   20X  Blue x 20 each Grey   TB     20X  NT 20X BTB      TB lateral step   NT  NT NT NT      Slant board stretch  Long-sitting gastroc / soleus stretch c belt 3 x 30sec NT 30 sec x 5 20SEC 5X  NT NT      Seated great toe stretch  3 x 30 sec NT 30 sec x 3 NT  NT      Pro st B        20SEC 10X BAPS B level 2         20X                    Calf st at steps    20sec 5x     20SEC 5X       HR/TR    20x     20X       Squats    20x     20X       SLS on foam    10x 10sec     10X 10SEC       Tandem alt B on foam    10x 10sec     6X       Prone IR/ER L hip    20x 2sec  20 x 2 sec NT  NT      HR/TR       20X       Ther Activity                                       Gait Training                                       Modalities

## 2023-09-09 PROBLEM — J01.00 ACUTE NON-RECURRENT MAXILLARY SINUSITIS: Status: RESOLVED | Noted: 2023-07-11 | Resolved: 2023-09-09

## 2023-09-12 ENCOUNTER — EVALUATION (OUTPATIENT)
Dept: PHYSICAL THERAPY | Age: 33
End: 2023-09-12
Payer: COMMERCIAL

## 2023-09-12 DIAGNOSIS — M72.2 PLANTAR FASCIAL FIBROMATOSIS OF BOTH FEET: Primary | ICD-10-CM

## 2023-09-12 DIAGNOSIS — M46.90 INFLAMMATORY SPONDYLOPATHY, UNSPECIFIED SPINAL REGION (HCC): ICD-10-CM

## 2023-09-12 DIAGNOSIS — M79.671 PAIN IN BOTH FEET: ICD-10-CM

## 2023-09-12 DIAGNOSIS — M54.16 LUMBAR RADICULOPATHY: ICD-10-CM

## 2023-09-12 DIAGNOSIS — M79.7 FIBROMYALGIA: ICD-10-CM

## 2023-09-12 DIAGNOSIS — M79.672 PAIN IN BOTH FEET: ICD-10-CM

## 2023-09-12 PROCEDURE — 97113 AQUATIC THERAPY/EXERCISES: CPT

## 2023-09-12 PROCEDURE — 97750 PHYSICAL PERFORMANCE TEST: CPT

## 2023-09-12 NOTE — PROGRESS NOTES
PT Evaluation  / PT Reassessment    Today's date: 2023  Patient name: Rock Diez  : 1990  MRN: [id number]  Referring provider: Viktoriya Arceo PA-C  Dx:   Encounter Diagnosis     ICD-10-CM    1. Plantar fascial fibromatosis of both feet  M72.2       2. Inflammatory spondylopathy, unspecified spinal region ([address])  M46.90       3. Fibromyalgia  M79.7       4. Lumbar radiculopathy  M54.16       5. Pain in both feet  M79.671     M79.672           Start Time: 830  Stop Time: 930  Total time in clinic (min): 60 minutes    Assessment  Assessment details: PT Reassessment: 23. Patient reported the following progress since onset of PT: decrease in left ankle pain, decrease in thoracic and cervical spine pain, increase in left lower extremity and cervical spine mobility, strength of left lower extremity, walking, standing, stair ascent, lifting, bending and reaching activities. But, she noted the following deficits that still persists: prolonged walking, house hold chores, and stair descent. PT Reassessment: 23. Patient reported the following improvement since onset of Pool based PT: decrease in pain, increase in mobility, dressing activities, self ialds and self functional activities. But, she noted the following deficits that still persists: reaching in all directions, bilateral posterior and plantar calcaneal region pain, pain in lumbar spine and chest, lifting, bilateral upper extremity weakness and fatigue with house hold chores like cleaning, sleep deprived. Patient noted she has been in a full body pain flair up over the past two weeks. PT IE: 2023. Patient reported she has FM, which has muscle weakness and pain. Patient noted she has truck pain due to AS and costochondritis. Patient noted she has soreness in left calcaneus and achilles region. Patient noted she is fatigued as well.   Patient noted she has pain through out her back, but she noted cervical and lumbar spine pain limits her functionally more than lumbar spine. Patient noted bilateral upper and lower extremity muscle weakness and fatigue persists with all self and house hold activities. Patient noted the following deficits due to lumbar, thoracic and cervical spine pain: prolonged standing during house hold chores like washing the dishes, vacuuming and sweeping, bending, lifting heavier items, carrying activities, reaching, using the cell phone for prolonged periods of time, sleep deprived with patient reported she sleeps too much or not at all, walking at only one block and then fatigued and repeated stair climbing. Patient noted intermittent bouts of bilateral upper or lower extremity paresthesias. Patient noted she does have TMJ issues as well and thus has jaw pain. Patient noted she is not currently working. Patient denies recent diagnostic testing for th spine regions. Impairments: abnormal gait, abnormal or restricted ROM, abnormal movement, activity intolerance, impaired physical strength, lacks appropriate home exercise program and pain with function  Understanding of Dx/Px/POC: excellent   Prognosis: good  Prognosis details: Patient is a 33y.o. year old female seen for outpatient PT reevaluation with pain, mobility and functional deficits due to FM, inflammatory spondylopathy, arthralgia, costochondritis and lumbar radiculopathy. Patient presents with the following progress since onset of PT: decrease in pain, increase in bilateral upper and lower extremity strength, increase in lumbar and cervical spine mobility, gait and transfer improvements, increase in left lower extremity mobility and strength and functional progress.   Patient presents to PT reassessment with the following problems, concerns, deficits and impairments: lumbar, thoracic and cervical spine pain, decreased lumbar and cervical spine range of motion, decreased bilateral upper and lower extremity mobility and strength, decrease in postural awareness, + TTP, + muscle guarding, functional limitations and decreased tolerance to activity. Patient would benefit from skilled PT services under the following PT treatment plan to address the above noted deficits: therapeutic exercises and activities to facilitate lumbar and cervical spine mobility and bilateral upper and lower extremity strength, abdominal strengthening and DLS activities, postural reeducation and strengthening, modalities, manual therapy techniques, IASTM techniques, Kinesio taping techniques and a hep. Thank you for the referral.     Goals  Short Term goals 4 - 6 weeks  1. Patient will be independent HEP. MET. 2.  Patient will report a 25 - 50% decrease in pain complaints. MET. 3.  Increase strength 1/2 grade. MET. 4.  Increase ROM 5-10 degrees. MET. Long Term goals 8 - 12 weeks  1. Patient will report elimination of pain complaints. Partially MET. 2.  Patient will return to all recreational activities without restriction. Partially MET. 3.  ROM WFL.  MET. 4.  Strength 5/5. MET. 5.  Patient will report walking improved by > 10 minutes prior to sitting to rest due to cervical, thoracic, lumbar, bilateral upper and lower extremity symptoms or limitations. MET. 6.  Patient will report ability to perform two flights of steps without cervical, thoracic, lumbar, bilateral upper and lower extremity symptoms or limitations. Partially MET. 7.  Patient will report house hold chores involving static standing improved by > 10 minutes prior to cervical, thoracic, lumbar, bilateral upper and lower extremity symptoms or limitations. MET. 8.  Patient will report ability to bend and lift during house hold activities without cervical, thoracic, lumbar, bilateral upper and lower extremity symptoms or limitations. Partially MET.   9.  Patient will report ability to reach during self and house hold iadls without cervical, thoracic, lumbar, bilateral upper and lower extremity symptoms or limitations. MET. Plan  Patient would benefit from: skilled physical therapy  Planned modality interventions: cryotherapy, TENS, thermotherapy: hydrocollator packs, traction, ultrasound and unattended electrical stimulation  Planned therapy interventions: joint mobilization, manual therapy, massage, aquatic therapy, balance, balance/weight bearing training, body mechanics training, muscle pump exercises, neuromuscular re-education, patient education, postural training, self care, compression, strengthening, stretching, therapeutic activities, therapeutic exercise, therapeutic training, flexibility, functional ROM exercises, gait training, graded activity, graded exercise, graded motor and home exercise program  Frequency: 2x week  Duration in weeks: 12  Treatment plan discussed with: patient        Subjective Evaluation    Patient Goals  Patient goals for therapy: decreased pain, increased motion, increased strength, independence with ADLs/IADLs and return to sport/leisure activities  Patient goal: Patient's goal is to get stronger. Pain  At best pain ratin  At worst pain ratin  Location: lumbar, thoracic and cervical spine          Objective     Tenderness     Additional Tenderness Details  Patient is + moderate TTP and severe muscle guarding at thoracic and cervical spine paraspinal musculature and bilateral upper trapezius muscle region. Patient exhibits protracted cervical spine at moderate levels. Patient is + moderate TTP and muscle guarding at lumbar spine erector spinae musculature.     Active Range of Motion   Cervical/Thoracic Spine       Cervical    Flexion: 26 degrees   Extension: 35 degrees      Left lateral flexion: 28 degrees      Right lateral flexion: 24 degrees      Left rotation: 58 degrees  Right rotation: 54 degrees         Left Shoulder   Flexion: 160 degrees   Abduction: 154 degrees   External rotation 45°: 60 degrees   Internal rotation 45°: 58 degrees     Right Shoulder   Flexion: 165 degrees   Abduction: 158 degrees   External rotation 45°: 64 degrees   Internal rotation 45°: 62 degrees     Lumbar   Flexion: 94 degrees   Extension: 36 degrees   Left lateral flexion: 24 degrees       Right lateral flexion: 20 degrees   Left rotation: 70 degrees   Right rotation: 62 degrees   Left Hip   Flexion: 104 degrees   Abduction: 16 degrees     Right Hip   Flexion: 106 degrees   Abduction: 24 degrees   Left Knee   Flexion: 104 degrees   Extension: -5 degrees   Extensor la degrees     Right Knee   Flexion: 110 degrees   Extension: -6 degrees   Extensor la degrees with pain  Left Ankle/Foot   Dorsiflexion (ke): 6 degrees   Plantar flexion: 48 degrees     Right Ankle/Foot   Dorsiflexion (ke): 4 degrees   Plantar flexion: 46 degrees     Strength/Myotome Testing     Left Shoulder     Planes of Motion   Flexion: 4   Abduction: 4   External rotation at 0°: 4   Internal rotation at 0°: 4     Right Shoulder     Planes of Motion   Flexion: 4   Abduction: 4   External rotation at 0°: 4   Internal rotation at 0°: 4+     Left Hip   Planes of Motion   Flexion: 4  Extension: 4  Abduction: 4  Adduction: 4+    Right Hip   Planes of Motion   Flexion: 4  Extension: 4  Abduction: 4  Adduction: 4+    Left Knee   Flexion: 4+  Extension: 4+    Right Knee   Flexion: 4+  Extension: 4+    Left Ankle/Foot   Dorsiflexion: 4+  Plantar flexion: 5  Inversion: 4+  Eversion: 4    Right Ankle/Foot   Dorsiflexion: 4+  Plantar flexion: 5  Inversion: 4+  Eversion: 4             Precautions:     Access Code: [email or web address]  URL: [email or web address]  Date: 2023  Prepared by: Aric Machuca    Exercises  - Correct Seated Posture  - 3 x daily - 7 x weekly - 2 sets - 10 reps  - Standing Lumbar Extension with Counter  - 3 x daily - 7 x weekly - 2 sets - 10 reps      Manuals                                                                 Neuro Re-Ed                                       HEP: postural correction seated 2 x             HEP lumbar extension against counter 2 x                                                    Ther Ex                          Water walking pre and post             Seated hamstring stretch:B:             LAQ:B:             Hip flexion:B:             Seated postural correction slough over correct             Cervical spine retraction             UT stretch:B:             LS stretch:B:                          Standing scapular squeezes             Standing abdominal contraction with ball push down             Standing slr x 3:B:             Standing hr and tr:B:             Mini squats              SLS and tandem stance:B:             Side stepping and tandem ambulation             Forward step ups:B:             Standing lumbar spine extension             Paddles rows, horizontal add / abd and shoulder add / abd             Pool pony bicycle                          Pool pony hang                                                                              Ther Activity                                       Gait Training                                       Modalities

## 2023-09-12 NOTE — PROGRESS NOTES
Daily Note     Today's date: 2023  Patient name: Ebonie Markham  : 1990  MRN: [id number]  Referring provider: Bandar Serna PA-C  Dx:   Encounter Diagnosis     ICD-10-CM    1. Costochondritis  M94.0       2. Plantar fascial fibromatosis of both feet  M72.2                 Subjective: "I am tired today, I hurt all over" 5/10 pain    Objective: See treatment diary below        Assessment: 3/10 pain after aqua based exercises. Tentative D/C next session. Plan: Continue with Plan. Trial ankle weights next session if able      Precautions: GERD, Migraine, HTN, cervical and lumbar radiculopathy, Ankylosing Spondylitis, TMJ, Dizziness, Chronic Fatigue, FM, Vitamin B12 and D Deficiencies.     Manuals     POOL  Pool  LAND  LAND  LAND    POOL  pool pool                 HEP: postural correction seated              HEP lumbar extension against counter                            Ther Ex                             Water walking pre and post  4 laps  4 laps    4X  4x  4x 4X 4x  4 x  4x   Seated hamstring stretch:B: 20sec 5x  20sec 5x    20SEC 5X  20sec 5x  20sec 5x  20SEC 5X  20sec 5x  20 sec x 5 20sec 5x    LAQ:B: 20x   20x     20X 2SEC  20x  20x  20x  20x  20x  20x   Hip flexion:B: 20x  20x     20X  20x  20x  20x  20x  20x  20x   Seated postural correction slough over correct  20x  20x    20X  20x  20x  NT 20x  20x  20x   Cervical spine retraction 20x 3sec  20x 3sec     20X 3SEC  20x 3sec  20x 3sec  3 sec x 20 15x 2sec  20 x 2sec  20x 2sec   UT stretch:B: 20x 3sec 20x 3sec    20SEC 5X  [id number]  20sec 5x 20 sec x 5 20sec 5x  20sec 5x  20sec 5x   LS stretch:B: 20sec 5x  20sec 5x    20SEC 5X  20sec 5x  20sec 5x  20 sec x 5 20sec 5x 20sec 5x  20sec 5x   Standing B curls  20x                Standing scapular squeezes  20x 3sec  20x 3sec    20X 3SEC  20x 3sec  20x 3sec  3 sec x 20 15x 3sec  20 x 3sec  10x 3 sec                  Standing slr x 3:B: 20x  20x 20X  20X  20X  2 x 10 20x  20X  20x   Standing hr and tr:B: 20x  20x    20X  20X  20X  2 x 10 20x  20X  20x                  SLS B 10X 10SEC  NT    10X 10SEC  10x 10sec  10X 10SEC  10 sec x 10 10sec 10x  10SEC 10X  10secx 10   Mini squats  20X  NT   20X  20X 20X  Hold NT 20X np     tandem stance:B: 10X 10SEC  NT   NT NT NT NT NT 10X 10SEC  10x10 sec   Side stepping    6x    6X  6X 6X  6X  6X  6X  6x     tandem ambulation  6x   6X      6 x     Forward step ups:B: NT NT   NT NT NT NT NT NT NT   Standing lumbar spine extension  20X  30x     20X  20x  20X  2 x 10 20X  20X  30x   Paddles rows, horizontal add / abd and shoulder add / abd  Bicep curls  NT NT    20X  2 x 10 NT NT 20x  2 x 10 20x    Pool pony bicycle 5 MIN  5 MIN    5 MIN  5 MIN  5 MIN  5 MIN  8 MIN  NT NT   Pool pony hang 5 MIN  NT   5 MIN      5 min    Ther Activity                            Gait Training                            Modalities                                         LAND    Manuals 7/18 7/24 8/7 8/14 8/25 8/31 9/8      Calf STM B    PF IASTM B JAB SP 10 min  X10'  GF  10 min  10 MIN  10 mi                     Ktape to B heel cord and arch     "I"    MAURO MAURO  MAURO  MAURO                   Neuro Re-Ed             Short foot     NT NT NT      [place]      NT NT NT      Maintain medial arch with:  Mini-squats  Mini-lunges  SLS  Seated 20x5"    Standing clamshell 10x5"    Mini-squat x10 NT  NT NT NT      clamshells   NT 5 sec x 10  NT NT NT      bridges   NT 5 sec x 20  NT NT NT      Hm st        20sec 5x       gastroc        20sec 5x       Ther Ex             HEP 10'            Bike/NS  5 min 10 MIN  X 10 min  10 MIN 10 MI  10 MIN       TB ankle 4-way  Blue x10ea BTB   20X  Blue x 20 each Grey   TB     20X  NT 20X BTB      TB lateral step   NT  NT NT NT      Slant board stretch  Long-sitting gastroc / soleus stretch c belt 3 x 30sec NT 30 sec x 5 20SEC 5X  NT NT      Seated great toe stretch  3 x 30 sec NT 30 sec x 3 NT  NT      Pro st B 20SEC 10X      BAPS B level 2         20X                    Calf st at steps    20sec 5x     20SEC 5X       HR/TR    20x     20X       Squats    20x     20X       SLS on foam    10x 10sec     10X 10SEC       Tandem alt B on foam    10x 10sec     6X       Prone IR/ER L hip    20x 2sec  20 x 2 sec NT  NT      HR/TR       20X       Ther Activity                                       Gait Training                                       Modalities

## 2023-09-14 ENCOUNTER — OFFICE VISIT (OUTPATIENT)
Dept: PHYSICAL THERAPY | Age: 33
End: 2023-09-14
Payer: COMMERCIAL

## 2023-09-14 DIAGNOSIS — M79.671 PAIN IN BOTH FEET: Primary | ICD-10-CM

## 2023-09-14 DIAGNOSIS — M79.672 PAIN IN BOTH FEET: Primary | ICD-10-CM

## 2023-09-14 DIAGNOSIS — M94.0 COSTOCHONDRITIS: ICD-10-CM

## 2023-09-14 PROCEDURE — 97113 AQUATIC THERAPY/EXERCISES: CPT

## 2023-09-14 NOTE — PROGRESS NOTES
Daily Note     Today's date: 2023  Patient name: Janet Christie  : 1990  MRN: [id number]  Referring provider: Lory Lay DPM  Dx:   Encounter Diagnosis     ICD-10-CM    1. Pain in both feet  M79.671     M79.672       2. Costochondritis  M94.0                 Subjective:"i am having a flare, I am in a lot of pain"     Objective: See treatment diary below        Assessment: Decreased symptoms and improved mobility while in aqua environment. Plan: D/C to HEP      Precautions: GERD, Migraine, HTN, cervical and lumbar radiculopathy, Ankylosing Spondylitis, TMJ, Dizziness, Chronic Fatigue, FM, Vitamin B12 and D Deficiencies.     Manuals     POOL  Pool  LAND  LAND  LAND  POOL   POOL  pool pool                 HEP: postural correction seated              HEP lumbar extension against counter                            Ther Ex                             Water walking pre and post  4 laps  4 laps    4X  4x  4x 4X 4x  4 x  4x   Seated hamstring stretch:B: 20sec 5x  20sec 5x    20SEC 5X  20sec 5x  20sec 5x  20SEC 5X  20sec 5x  20 sec x 5 20sec 5x    LAQ:B: 20x   20x     20X 2SEC  20x  20x  20x  20x  20x  20x   Hip flexion:B: 20x  20x     20X  20x  20x  20x  20x  20x  20x   Seated postural correction slough over correct  20x  20x    20X  20x  20x  NT 20x  20x  20x   Cervical spine retraction 20x 3sec  20x 3sec     20X 3SEC  20x 3sec  20x 3sec  3 sec x 20 15x 2sec  20 x 2sec  20x 2sec   UT stretch:B: 20x 3sec 20x 3sec    20SEC 5X  [id number]  20sec 5x 20 sec x 5 20sec 5x  20sec 5x  20sec 5x   LS stretch:B: 20sec 5x  20sec 5x    20SEC 5X  20sec 5x  20sec 5x  20 sec x 5 20sec 5x 20sec 5x  20sec 5x   Standing B curls  20x                Standing scapular squeezes  20x 3sec  20x 3sec    20X 3SEC  20x 3sec  20x 3sec  3 sec x 20 15x 3sec  20 x 3sec  10x 3 sec                  Standing slr x 3:B: 20x  20x    20X  20X  20X  2 x 10 20x  20X  20x   Standing hr and tr:B: 20x  20x    20X  20X  20X  2 x 10 20x  20X  20x                  SLS B 10X 10SEC  NT    10X 10SEC  10x 10sec  10X 10SEC  10 sec x 10 10sec 10x  10SEC 10X  10secx 10   Mini squats  20X  NT   20X  20X 20X  Hold NT 20X np     tandem stance:B: 10X 10SEC  NT   NT NT NT NT NT 10X 10SEC  10x10 sec   Side stepping    6x    6X  6X 6X  6X  6X  6X  6x     tandem ambulation  6x   6X      6 x     Forward step ups:B: NT NT   NT NT NT NT NT NT NT   Standing lumbar spine extension  20X  30x     20X  20x  20X  2 x 10 20X  20X  30x   Paddles rows, horizontal add / abd and shoulder add / abd  Bicep curls  NT NT    20X  2 x 10 NT NT 20x  2 x 10 20x    Pool pony bicycle 5 MIN  5 MIN    5 MIN  5 MIN  5 MIN  5 MIN  8 MIN  NT NT   Pool pony hang 5 MIN  NT   5 MIN      5 min    Ther Activity                            Gait Training                            Modalities                                         LAND    Manuals 7/18 7/24 8/7 8/14 8/25 8/31 9/8 9/14  POOL      Calf STM B    PF IASTM B JAB SP 10 min  X10'  GF  10 min  10 MIN  10 mi                     Ktape to B heel cord and arch     "I"    [place]                  Neuro Re-Ed             Short foot     NT NT NT      [place]      NT NT NT      Maintain medial arch with:  Mini-squats  Mini-lunges  SLS  Seated 20x5"    Standing clamshell 10x5"    Mini-squat x10 NT  NT NT NT      clamshells   NT 5 sec x 10  NT NT NT      bridges   NT 5 sec x 20  NT NT NT      Hm st        20sec 5x       gastroc        20sec 5x       Ther Ex             HEP 10'            Bike/NS  5 min 10 MIN  X 10 min  10 MIN 10 MI  10 MIN       TB ankle 4-way  Blue x10ea BTB   20X  Blue x 20 each Grey   TB     20X  NT 20X BTB      TB lateral step   NT  NT NT NT      Slant board stretch  Long-sitting gastroc / soleus stretch c belt 3 x 30sec NT 30 sec x 5 20SEC 5X  NT NT      Seated great toe stretch  3 x 30 sec NT 30 sec x 3 NT  NT      Pro st B        20SEC 10X      BAPS B level 2 20X                    Calf st at steps    20sec 5x     20SEC 5X       HR/TR    20x     20X       Squats    20x     20X       SLS on foam    10x 10sec     10X 10SEC       Tandem alt B on foam    10x 10sec     6X       Prone IR/ER L hip    20x 2sec  20 x 2 sec NT  NT      HR/TR       20X       Ther Activity                                       Gait Training                                       Modalities

## 2023-09-15 ENCOUNTER — OFFICE VISIT (OUTPATIENT)
Dept: URGENT CARE | Facility: CLINIC | Age: 33
End: 2023-09-15
Payer: COMMERCIAL

## 2023-09-15 VITALS
RESPIRATION RATE: 16 BRPM | HEIGHT: 63 IN | DIASTOLIC BLOOD PRESSURE: 83 MMHG | OXYGEN SATURATION: 98 % | TEMPERATURE: 98.3 F | WEIGHT: 269.2 LBS | SYSTOLIC BLOOD PRESSURE: 141 MMHG | BODY MASS INDEX: 47.7 KG/M2 | HEART RATE: 92 BPM

## 2023-09-15 DIAGNOSIS — J02.9 SORE THROAT: ICD-10-CM

## 2023-09-15 DIAGNOSIS — J02.0 STREP PHARYNGITIS: Primary | ICD-10-CM

## 2023-09-15 LAB — S PYO AG THROAT QL: POSITIVE

## 2023-09-15 PROCEDURE — 87880 STREP A ASSAY W/OPTIC: CPT | Performed by: NURSE PRACTITIONER

## 2023-09-15 PROCEDURE — 99213 OFFICE O/P EST LOW 20 MIN: CPT | Performed by: NURSE PRACTITIONER

## 2023-09-15 RX ORDER — PENICILLIN V POTASSIUM 500 MG/1
500 TABLET ORAL 2 TIMES DAILY
Qty: 20 TABLET | Refills: 0 | Status: SHIPPED | OUTPATIENT
Start: 2023-09-15 | End: 2023-09-25

## 2023-09-15 NOTE — PROGRESS NOTES
[facility]        NAME: Dwain Koo is a 29 y.o. female  : 1990    MRN: [id number]  DATE: September 15, 2023  TIME: 12:37 PM    Assessment and Plan   Strep pharyngitis [J02.0]  1. Strep pharyngitis  penicillin V potassium (VEETID) 500 mg tablet      2. Sore throat  POCT rapid strepA        Rapid strep positive. Patient Instructions     Patient Instructions   Take antibiotic as directed. Discard toothbrush in 48 hours. Drink plenty of fluids, rest, saltwater gargles, lozenges, hot tea with honey. Tylenol or motrin for pain. If you develop a prolonged high fever, difficulty breathing, swallowing, managing secretions, decreased fluid intake, or urination, any new or concerning symptoms please return or proceed ER. Recommend following up with PCP in 3-5 days. Chief Complaint     Chief Complaint   Patient presents with   • Sore Throat     Sore throat, B/L earache started yesterday         History of Present Illness       Sore Throat   This is a new problem. The current episode started yesterday. The problem has been unchanged. Neither side of throat is experiencing more pain than the other. There has been no fever. The pain is moderate. Associated symptoms include ear pain and swollen glands. Pertinent negatives include no abdominal pain, congestion, coughing, diarrhea, ear discharge, headaches, hoarse voice, plugged ear sensation, neck pain, shortness of breath, stridor, trouble swallowing or vomiting. She has had exposure to strep. She has tried nothing for the symptoms. The treatment provided no relief. Review of Systems   Review of Systems   Constitutional: Positive for chills. Negative for diaphoresis, fatigue and fever. HENT: Positive for ear pain and sore throat. Negative for congestion, ear discharge, facial swelling, hoarse voice, mouth sores, postnasal drip, rhinorrhea, sinus pressure, sinus pain and trouble swallowing.     Eyes: Negative for photophobia and visual disturbance. Respiratory: Negative for cough, chest tightness, shortness of breath and stridor. Cardiovascular: Negative for chest pain. Gastrointestinal: Negative for abdominal pain, diarrhea, nausea and vomiting. Genitourinary: Negative. Musculoskeletal: Positive for myalgias. Negative for arthralgias, back pain, joint swelling, neck pain and neck stiffness. Skin: Negative for rash. Neurological: Negative for dizziness, facial asymmetry, weakness, light-headedness, numbness and headaches. Current Medications       Current Outpatient Medications:   •  Aimovig 140 MG/ML SOAJ, Inject 140mg (1 pen) under the skin every 30 days. , Disp: 1 mL, Rfl: 11  •  ALPRAZolam (XANAX) 0.5 mg tablet, Take 1 tablet (0.5 mg total) by mouth 2 (two) times a day as needed for anxiety, Disp: 60 tablet, Rfl: 2  •  buPROPion (WELLBUTRIN XL) 300 mg 24 hr tablet, Take 1 tablet (300 mg total) by mouth daily, Disp: 90 tablet, Rfl: 0  •  Cholecalciferol (Vitamin D3) 50 MCG (2000 UT) capsule, Take 1 capsule (2,000 Units total) by mouth daily, Disp: 90 capsule, Rfl: 1  •  cyanocobalamin 1,000 mcg/mL, 1 IM injection every month, Disp: 3 mL, Rfl: 3  •  diazepam (VALIUM) 5 mg tablet, Take 1 tablet (5 mg total) by mouth daily at bedtime as needed for sleep or muscle spasms, Disp: 30 tablet, Rfl: 1  •  escitalopram (LEXAPRO) 20 mg tablet, Take 1 tablet (20 mg total) by mouth daily, Disp: 90 tablet, Rfl: 0  •  etonogestrel-ethinyl estradiol (NuvaRing) 0.12-0.015 MG/24HR vaginal ring, Insert ring for 21 days then remove for one week., Disp: 3 each, Rfl: 4  •  FeroSul 325 (65 Fe) MG tablet, Take 1 tablet (325 mg total) by mouth in the morning, Disp: 90 tablet, Rfl: 1  •  indomethacin (INDOCIN) 25 mg capsule, 1-2 tabs BID prn severe headache with food.  Hold toradol., Disp: 30 capsule, Rfl: 0  •  inFLIXimab-axxq (Avsola) 100 mg SOLR, Inject 5 mg/kg into a catheter in a vein, Disp: , Rfl:   •  OLANZapine (ZyPREXA) 2.5 mg tablet, Take 1 tablet (2.5 mg total) by mouth daily Do not take with reglan., Disp: 30 tablet, Rfl: 2  •  onabotulinumtoxin A (BOTOX) 100 units, Inject as directed, Disp: , Rfl:   •  ondansetron (ZOFRAN-ODT) 4 mg disintegrating tablet, Take 1 tablet (4 mg total) by mouth every 6 (six) hours as needed for nausea or vomiting, Disp: 90 tablet, Rfl: 0  •  pantoprazole (PROTONIX) 40 mg tablet, Take 1 tablet (40 mg total) by mouth daily, Disp: 30 tablet, Rfl: 3  •  penicillin V potassium (VEETID) 500 mg tablet, Take 1 tablet (500 mg total) by mouth 2 (two) times a day for 10 days, Disp: 20 tablet, Rfl: 0  •  pramipexole (MIRAPEX) 0.25 mg tablet, Take 1 tablet (0.25 mg total) by mouth daily at bedtime, Disp: 34 tablet, Rfl: 3  •  prazosin (MINIPRESS) 2 mg capsule, Take 1 capsule (2 mg total) by mouth daily at bedtime, Disp: 30 capsule, Rfl: 2  •  Trudhesa 0.725 MG/ACT AERS, 1 spray in each nostril for total dose of 1.45mg, may repeat 1 dose after 1 hour. Max 2 doses per 24 hours and 3 doses per week., Disp: 12 mL, Rfl: 6  •  dicyclomine (BENTYL) 20 mg tablet, Take 1 tablet (20 mg total) by mouth 2 (two) times a day for 7 days, Disp: 14 tablet, Rfl: 0  •  magnesium Oxide (MAG-OX) 400 mg TABS, Take 1 tablet (400 mg total) by mouth daily, Disp: 30 tablet, Rfl: 0  •  Syringe/Needle, Disp, (SYRINGE 3CC/23LZ2-6/2") 25G X 1-1/2" 3 ML MISC, Use for B12 IM injections.  (Patient not taking: Reported on 8/1/2023), Disp: 4 each, Rfl: 0    Current Facility-Administered Medications:   •  cyanocobalamin injection 1,000 mcg, 1,000 mcg, Intramuscular, Q30 Days, Rylee Garcia DO, 1,000 mcg at 08/03/20 0308  •  cyanocobalamin injection 1,000 mcg, 1,000 mcg, Intramuscular, Q14 Days, Rylee Jose, DO, 1,000 mcg at 05/18/20 1146  •  cyanocobalamin injection 1,000 mcg, 1,000 mcg, Intramuscular, Q30 Days, Rylee Jose, DO, 1,000 mcg at 09/01/20 1129    Current Allergies     Allergies as of 09/15/2023 - Reviewed 09/15/2023   Allergen Reaction Noted   • Cimzia [certolizumab pegol] Swelling 03/22/2022   • Desvenlafaxine  11/26/2012   • Ixekizumab Vomiting 05/27/2022   • Valproic acid Other (See Comments) 10/18/2017   • Voltaren [diclofenac] Swelling 12/13/2022   • Tizanidine Anxiety 01/20/2021            The following portions of the patient's history were reviewed and updated as appropriate: allergies, current medications, past family history, past medical history, past social history, past surgical history and problem list.     Past Medical History:   Diagnosis Date   • Abnormal Pap smear of cervix    • Allergic    • Anxiety    • Arthritis    • Depression    • Diabetes mellitus ([address])    • Fibromyalgia, primary    • Hypertension    • IBS (irritable bowel syndrome)    • Migraine    • Obesity    • Vitamin B12 deficiency        Past Surgical History:   Procedure Laterality Date   • COLONOSCOPY N/A 5/8/2018    Procedure: COLONOSCOPY;  Surgeon: Julieth Araujo MD;  Location: [facility] GI LAB;   Service: Gastroenterology   • OR EXC B9 LESION MRGN XCP SK TG S/N/H/F/G > 4.0CM N/A 7/1/2021    Procedure: EXCISION OF PILAR SCALP CYST X 2 AND RIGHT INNER GROIN NEVVUS;  Surgeon: Keagan Herrera MD;  Location: [address] [place] [address] OR;  Service: Plastics   • OR REPAIR COMPLEX SCALP/ARM/LEG 2.6-7.5 CM N/A 7/1/2021    Procedure: CLOSURE WOUND SCALP X 2 AND RIGHT INNER GROIN;  Surgeon: Keagan Herrera MD;  Location: [address] [place] [address] OR;  Service: Plastics   • TONSILLECTOMY     • WISDOM TOOTH EXTRACTION         Family History   Problem Relation Age of Onset   • Rheum arthritis Mother    • Psoriasis Mother    • Other Mother    • Hypertension Mother    • Diabetes unspecified Mother    • Sjogren's syndrome Mother    • Alcohol abuse Mother    • Drug abuse Mother    • Anxiety disorder Father    • Alcohol abuse Father    • Lung cancer Maternal Grandfather    • Cancer Other         bone   • Diabetes Other    • Other Other         High blood pressure   • Depression Maternal Grandmother          Medications have been verified. Objective   /83   Pulse 92   Temp 98.3 °F (36.8 °C)   Resp 16   Ht 5' 3" (1.6 m)   Wt 122 kg (269 lb 3.2 oz)   SpO2 98%   BMI 47.69 kg/m²   No LMP recorded. Physical Exam     Physical Exam  Constitutional:       General: She is not in acute distress. Appearance: She is well-developed. HENT:      Head: Normocephalic and atraumatic. Right Ear: Hearing, tympanic membrane, ear canal and external ear normal.      Left Ear: Hearing, tympanic membrane, ear canal and external ear normal.      Nose:      Right Sinus: No maxillary sinus tenderness or frontal sinus tenderness. Left Sinus: No maxillary sinus tenderness or frontal sinus tenderness. Mouth/Throat:      Pharynx: Oropharynx is clear. Uvula midline. Posterior oropharyngeal erythema present. No oropharyngeal exudate. Tonsils: Tonsillar exudate present. No tonsillar abscesses. 2+ on the right. 2+ on the left. Eyes:      Conjunctiva/sclera: Conjunctivae normal.      Pupils: Pupils are equal, round, and reactive to light. Cardiovascular:      Rate and Rhythm: Regular rhythm. Pulses: Normal pulses. Heart sounds: Normal heart sounds, S1 normal and S2 normal.   Pulmonary:      Effort: Pulmonary effort is normal.      Breath sounds: Normal breath sounds. Lymphadenopathy:      Cervical: Cervical adenopathy present. Right cervical: Superficial cervical adenopathy present. Left cervical: Superficial cervical adenopathy present. Skin:     General: Skin is warm and dry. Capillary Refill: Capillary refill takes less than 2 seconds. Neurological:      Mental Status: She is alert and oriented to person, place, and time.

## 2023-09-15 NOTE — PATIENT INSTRUCTIONS
Take antibiotic as directed. Discard toothbrush in 48 hours. Drink plenty of fluids, rest, saltwater gargles, lozenges, hot tea with honey. Tylenol or motrin for pain. If you develop a prolonged high fever, difficulty breathing, swallowing, managing secretions, decreased fluid intake, or urination, any new or concerning symptoms please return or proceed ER. Recommend following up with PCP in 3-5 days.

## 2023-09-18 ENCOUNTER — TELEMEDICINE (OUTPATIENT)
Dept: BEHAVIORAL/MENTAL HEALTH CLINIC | Facility: CLINIC | Age: 33
End: 2023-09-18
Payer: COMMERCIAL

## 2023-09-18 DIAGNOSIS — F41.1 GENERALIZED ANXIETY DISORDER: Chronic | ICD-10-CM

## 2023-09-18 DIAGNOSIS — F33.1 DEPRESSION, MAJOR, RECURRENT, MODERATE (HCC): Primary | Chronic | ICD-10-CM

## 2023-09-18 PROCEDURE — 90834 PSYTX W PT 45 MINUTES: CPT | Performed by: SOCIAL WORKER

## 2023-09-18 NOTE — PSYCH
Virtual Regular Visit    Verification of patient location:    Patient is located at Home in the following state in which I hold an active license [place]    Assessment/Plan:    Problem List Items Addressed This Visit        Other    Generalized anxiety disorder (Chronic)    Depression, major, recurrent, moderate ([address]) - Primary (Chronic)     Goals addressed in session: Goal 1      Reason for visit is   Chief Complaint   Patient presents with   • Virtual Regular Visit      Encounter provider APARNA Stone    Provider located at [address] [place] [address] [address]  [address] [address] [phone]    Recent Visits  No visits were found meeting these conditions. Showing recent visits within past 7 days and meeting all other requirements  Today's Visits  Date Type Provider Dept   09/18/23 [address] [place] [address], [facility] [address] today's visits and meeting all other requirements  Future Appointments  No visits were found meeting these conditions. Showing future appointments within next 150 days and meeting all other requirements     The patient was identified by name and date of birth. Shauna Norton was informed that this is a telemedicine visit and [facility] She agrees to proceed. .  My office door was closed. No one else was in the room. She acknowledged consent and understanding of privacy and security of the video platform. The patient has agreed to participate and understands they can discontinue the visit at any time. Patient is aware this is a billable service. Kylee Mittal is a 29 y.o. female.     HPI     Past Medical History:   Diagnosis Date   • Abnormal Pap smear of cervix    • Allergic    • Anxiety    • Arthritis    • Depression    • Diabetes mellitus ([address])    • Fibromyalgia, primary    • Hypertension • IBS (irritable bowel syndrome)    • Migraine    • Obesity    • Vitamin B12 deficiency        Past Surgical History:   Procedure Laterality Date   • COLONOSCOPY N/A 5/8/2018    Procedure: COLONOSCOPY;  Surgeon: Earl Oakes MD;  Location: [facility] GI LAB; Service: Gastroenterology   • DE EXC B9 LESION MRGN XCP SK TG S/N/H/F/G > 4.0CM N/A 7/1/2021    Procedure: EXCISION OF PILAR SCALP CYST X 2 AND RIGHT INNER GROIN NEVVUS;  Surgeon: Gonzalo Hall MD;  [facility];  Service: Plastics   • DE REPAIR COMPLEX SCALP/ARM/LEG 2.6-7.5 CM N/A 7/1/2021    Procedure: CLOSURE WOUND SCALP X 2 AND RIGHT INNER GROIN;  Surgeon: Gonzalo Hall MD;  [facility];  Service: Plastics   • TONSILLECTOMY     • WISDOM TOOTH EXTRACTION         Current Outpatient Medications   Medication Sig Dispense Refill   • Aimovig 140 MG/ML SOAJ Inject 140mg (1 pen) under the skin every 30 days. 1 mL 11   • ALPRAZolam (XANAX) 0.5 mg tablet Take 1 tablet (0.5 mg total) by mouth 2 (two) times a day as needed for anxiety 60 tablet 2   • buPROPion (WELLBUTRIN XL) 300 mg 24 hr tablet Take 1 tablet (300 mg total) by mouth daily 90 tablet 0   • Cholecalciferol (Vitamin D3) 50 MCG (2000 UT) capsule Take 1 capsule (2,000 Units total) by mouth daily 90 capsule 1   • cyanocobalamin 1,000 mcg/mL 1 IM injection every month 3 mL 3   • diazepam (VALIUM) 5 mg tablet Take 1 tablet (5 mg total) by mouth daily at bedtime as needed for sleep or muscle spasms 30 tablet 1   • dicyclomine (BENTYL) 20 mg tablet Take 1 tablet (20 mg total) by mouth 2 (two) times a day for 7 days 14 tablet 0   • escitalopram (LEXAPRO) 20 mg tablet Take 1 tablet (20 mg total) by mouth daily 90 tablet 0   • etonogestrel-ethinyl estradiol (NuvaRing) 0.12-0.015 MG/24HR vaginal ring Insert ring for 21 days then remove for one week.  3 each 4   • FeroSul 325 (65 Fe) MG tablet Take 1 tablet (325 mg total) by mouth in the morning 90 tablet 1   • indomethacin (INDOCIN) 25 mg capsule 1-2 tabs BID prn severe headache with food. Hold toradol. 30 capsule 0   • inFLIXimab-axxq (Avsola) 100 mg SOLR Inject 5 mg/kg into a catheter in a vein     • magnesium Oxide (MAG-OX) 400 mg TABS Take 1 tablet (400 mg total) by mouth daily 30 tablet 0   • OLANZapine (ZyPREXA) 2.5 mg tablet Take 1 tablet (2.5 mg total) by mouth daily Do not take with reglan. 30 tablet 2   • onabotulinumtoxin A (BOTOX) 100 units Inject as directed     • ondansetron (ZOFRAN-ODT) 4 mg disintegrating tablet Take 1 tablet (4 mg total) by mouth every 6 (six) hours as needed for nausea or vomiting 90 tablet 0   • pantoprazole (PROTONIX) 40 mg tablet Take 1 tablet (40 mg total) by mouth daily 30 tablet 3   • penicillin V potassium (VEETID) 500 mg tablet Take 1 tablet (500 mg total) by mouth 2 (two) times a day for 10 days 20 tablet 0   • pramipexole (MIRAPEX) 0.25 mg tablet Take 1 tablet (0.25 mg total) by mouth daily at bedtime 34 tablet 3   • prazosin (MINIPRESS) 2 mg capsule Take 1 capsule (2 mg total) by mouth daily at bedtime 30 capsule 2   • Syringe/Needle, Disp, (SYRINGE 3CC/01PA3-0/2") 25G X 1-1/2" 3 ML MISC Use for B12 IM injections. (Patient not taking: Reported on 8/1/2023) 4 each 0   • Trudhesa 0.725 MG/ACT AERS 1 spray in each nostril for total dose of 1.45mg, may repeat 1 dose after 1 hour. Max 2 doses per 24 hours and 3 doses per week.  12 mL 6     Current Facility-Administered Medications   Medication Dose Route Frequency Provider Last Rate Last Admin   • cyanocobalamin injection 1,000 mcg  1,000 mcg Intramuscular Q30 Days Kira Sang, DO   1,000 mcg at 08/03/20 0183   • cyanocobalamin injection 1,000 mcg  1,000 mcg Intramuscular Q14 Days Kira Sang, DO   1,000 mcg at 05/18/20 1146   • cyanocobalamin injection 1,000 mcg  1,000 mcg Intramuscular Q30 Days Kira Sang, DO   1,000 mcg at 09/01/20 1129        Allergies   Allergen Reactions   • Cimzia [Certolizumab Pegol] Swelling   • Desvenlafaxine      Other reaction(s): state of confusion   • Ixekizumab Vomiting   • Valproic Acid Other (See Comments)     Other reaction(s): dilated pupils, "schizi"   • Voltaren [Diclofenac] Swelling   • Tizanidine Anxiety       Review of Systems    Video Exam    There were no vitals filed for this visit. Physical Exam     Behavioral Health Psychotherapy Progress Note    Psychotherapy Provided: Individual Psychotherapy     1. Depression, major, recurrent, moderate ([address])        2. Generalized anxiety disorder            Goals addressed in session: Goal 1     DATA: Met with Stephanie for scheduled individual session. She spent some time talking about her friendship with Mile Marcos. She states that she is being more assertive with her friend, when her friend says things that hurt her feelings. She states that, at times, Mile Marcos seems to be responding to her efforts. She states that she and Mile Marcos have been friends for 10 years. She also states that her relationships with Hakans children is a positive in her life, and they told Salvador Medrano that she is their "dad". She states that she is grateful to have them in her life. Salvador Medrano discussed her physical health issues. She states that she is done with her Physical Therapy, because her insurance will no longer pay for her to attend this service. She states that triston is feeling very sad that she will not have the support to help her to increase her mobility and endurance. She also reports that she developed good connections with her PT practitioners and feels that it is also a loss of supportive relationships. Salvador Medrano states that she got her lab work done, and she reports that her "numbers went down." She states that she is hopeful that this means that the new infusion is working for her. Salvador Medrano reports she was diagnosed with strep over this past weekend, and she is now on antibiotics. She has noticed that some of her senses are more sensitive (e.g., she states that things seem extra loud to her).  She states that she is not sure if this is an anxiety symptom. This clinician encouraged her to give it a day or two-- to see if it is a result of her having an acute illness that is making her more sensitive to stimuli. If that is the case, the sensitivity should diminish as her body feels better. Elbert Galan states that her grandmother and mother are doing ok. Elbert Galan is participating in mindfulness classes that her previous therapist is offering. She states that she feels she is learning from the classes. During this session, this clinician used the following therapeutic modalities: Client-centered Therapy, Dialectical Behavior Therapy, Mindfulness-based Strategies, Motivational Interviewing, Solution-Focused Therapy and Supportive Psychotherapy    Substance Abuse was not addressed during this session. If the client is diagnosed with a co-occurring substance use disorder, please indicate any changes in the frequency or amount of use: n/a. Stage of change for addressing substance use diagnoses: No substance use/Not applicable    ASSESSMENT:  Mary Ann Griffith presents with a Euthymic/ normal mood. her affect is Normal range and intensity, which is congruent, with her mood and the content of the session. The client has made progress on their goals. Mary Ann Griffith presents with a minimal risk of suicide, minimal risk of self-harm, and minimal risk of harm to others. For any risk assessment that surpasses a "low" rating, a safety plan must be developed. A safety plan was indicated: no  If yes, describe in detail n/a    PLAN: Between sessions, Mary Ann Griffith will continue to monitor her moods. She will continue to set/maintain limits with her best friend. Elbert Galan will increase physical activity as she is able (while being mindful of what her body needs).  At the next session, the therapist will use Client-centered Therapy, Dialectical Behavior Therapy, Mindfulness-based Strategies, Motivational Interviewing, Solution-Focused Therapy and Supportive Psychotherapy to address her mood regulation and relationship concerns. Behavioral Health Treatment Plan and Discharge Planning: Michelle Vega is aware of and agrees to continue to work on their treatment plan. They have identified and are working toward their discharge goals.  yes    Visit start and stop times:    09/18/23  Start Time: 1101  Stop Time: 1152  Total Visit Time: 51 minutes

## 2023-09-19 NOTE — TELEPHONE ENCOUNTER
Impression     History of pancreatic adenocarcinoma complicated by extensive bilobar hepatic   metastatic disease status post biliary stenting x 2 presents with MDR hepatic   subcapsular abscess status post PTC drain - cultures positive for ESBL Klebsiella  Klebsiella pneumoniae as well as Enterococcus faecium (VRE)     Plan    1. Continue meropenem 1000 mg IV three times daily   2. Continue daptomycin 800 mg IV daily   3. Continue fluconazole 200 mg IV daily as long as inpatient   4. Continue PTC and IR drains   5. Upon discharge transition antibiotics to linezolid 600 mg by mouth twice   daily and moxifloxacin 400 mg by mouth daily x 14 days - will avoid further  fluconazole given risk of qTc prolongation with fluoroquinolone     Thank you kindly for this referral.  The patient has been discussed with   surgery service earlier.    Alex Umanzor MD  [phone]   [facility] phone call message- patient requesting a refill:    Full Medication Name:diazepam (VALIUM) 10 MG tablet     Dose:Take 20 mg 30-60 minutes before procedure. If the procedure islonger than 2 hours, take an additional 10 mg. Do not operate a vehicle after.     Pharmacy confirmed as   [facility]/pharmacy #[id number] [address] [address]  Phone: [phone] Fax: [phone]  : Yes    Additional Comments: Pt's dental appt is on April 6th.     OK to leave a message on voice mail? Yes    Primary language: English      needed? No    Call taken on March 10, 2021 at 9:30 AM by Nyla Dutta   Pt's mom called to make a payment  Call transferred to [facility]

## 2023-09-20 ENCOUNTER — OFFICE VISIT (OUTPATIENT)
Dept: FAMILY MEDICINE CLINIC | Facility: CLINIC | Age: 33
End: 2023-09-20
Payer: COMMERCIAL

## 2023-09-20 VITALS
HEART RATE: 81 BPM | BODY MASS INDEX: 47.54 KG/M2 | SYSTOLIC BLOOD PRESSURE: 126 MMHG | DIASTOLIC BLOOD PRESSURE: 80 MMHG | OXYGEN SATURATION: 97 % | WEIGHT: 268.3 LBS | TEMPERATURE: 97.3 F | HEIGHT: 63 IN

## 2023-09-20 DIAGNOSIS — M46.90 INFLAMMATORY SPONDYLOPATHY, UNSPECIFIED SPINAL REGION (HCC): Primary | ICD-10-CM

## 2023-09-20 DIAGNOSIS — M45.0 ANKYLOSING SPONDYLITIS OF MULTIPLE SITES IN SPINE (HCC): ICD-10-CM

## 2023-09-20 DIAGNOSIS — J02.0 PHARYNGITIS DUE TO STREPTOCOCCUS SPECIES: ICD-10-CM

## 2023-09-20 PROCEDURE — 99214 OFFICE O/P EST MOD 30 MIN: CPT | Performed by: FAMILY MEDICINE

## 2023-09-20 RX ORDER — AMOXICILLIN AND CLAVULANATE POTASSIUM 875; 125 MG/1; MG/1
1 TABLET, FILM COATED ORAL EVERY 12 HOURS SCHEDULED
Qty: 14 TABLET | Refills: 0 | Status: SHIPPED | OUTPATIENT
Start: 2023-09-20 | End: 2023-09-27

## 2023-09-20 RX ORDER — CEFDINIR 300 MG/1
CAPSULE ORAL
COMMUNITY
Start: 2023-09-15

## 2023-09-20 NOTE — PROGRESS NOTES
Assessment/Plan: Note for patient to have a shower chair given history of inflammatory spondylopathy/ankylosing spondylitis. Patient will stop penicillin for strep throat. Patient will use Augmentin as directed. Patient will stop penicillin. Supportive care recommended. Diagnoses and all orders for this visit:    Inflammatory spondylopathy, unspecified spinal region [facility])  -     Shower chair    Ankylosing spondylitis of multiple sites in spine [facility])  -     Shower chair    Pharyngitis due to Streptococcus species  -     amoxicillin-clavulanate (AUGMENTIN) 875-125 mg per tablet; Take 1 tablet by mouth every 12 (twelve) hours for 7 days    Other orders  -     cefdinir (OMNICEF) 300 mg capsule            Subjective:        Patient ID: Cinthya Zendejas is a 29 y.o. female. Patient is here status post urgent care last Friday. Patient was given antibiotics for strep. Patient was given penicillin. Patient is not seeing improvement at this time. Patient did have rapid strep positive test there. Some bilateral ear pain noted associate with this. Patient with plaquing on her tongue posteriorly. Patient also with some rhinorrhea and postnasal drip associated with this. Patient also needs letter stating she will need a shower chair. Patient unable to stand for extended periods of time. Patient with history of inflammatory arthralgias/arthritis/ankylosing spondylitis. Patient would like plaque. Due to difficulty with gait walking long distances        The following portions of the patient's history were reviewed and updated as appropriate: allergies, current medications, past family history, past medical history, past social history, past surgical history and problem list.      Review of Systems   Constitutional: Negative. Negative for fever. HENT: Positive for congestion, ear pain, postnasal drip, rhinorrhea, sinus pressure, sinus pain and sore throat. Eyes: Negative.     Respiratory: Positive for cough. Cardiovascular: Negative. Gastrointestinal: Negative. Endocrine: Negative. Genitourinary: Negative. Musculoskeletal: Positive for arthralgias, back pain and neck pain. Skin: Negative. Allergic/Immunologic: Negative. Neurological: Negative. Hematological: Negative. Psychiatric/Behavioral: Negative. Objective:               /80 (BP Location: Right arm, Patient Position: Sitting, Cuff Size: Large)   Pulse 81   Temp (!) 97.3 °F (36.3 °C) (Temporal)   Ht 5' 3" (1.6 m)   Wt 122 kg (268 lb 4.8 oz)   SpO2 97%   BMI 47.53 kg/m²          Physical Exam  Vitals and nursing note reviewed. Constitutional:       General: She is not in acute distress. Appearance: She is obese. She is ill-appearing. She is not toxic-appearing or diaphoretic. HENT:      Head: Normocephalic and atraumatic. Right Ear: Tympanic membrane, ear canal and external ear normal. There is no impacted cerumen. Left Ear: Tympanic membrane, ear canal and external ear normal. There is no impacted cerumen. Nose: Nose normal. No congestion or rhinorrhea. Mouth/Throat:      Mouth: Mucous membranes are moist.      Pharynx: Posterior oropharyngeal erythema present. No oropharyngeal exudate. Eyes:      General: No scleral icterus. Right eye: No discharge. Left eye: No discharge. Extraocular Movements: Extraocular movements intact. Conjunctiva/sclera: Conjunctivae normal.      Pupils: Pupils are equal, round, and reactive to light. Neck:      Vascular: No carotid bruit. Cardiovascular:      Rate and Rhythm: Normal rate and regular rhythm. Pulses: Normal pulses. Heart sounds: Normal heart sounds. No murmur heard. No friction rub. No gallop. Pulmonary:      Effort: Pulmonary effort is normal. No respiratory distress. Breath sounds: Normal breath sounds. No stridor. No wheezing, rhonchi or rales.    Chest:      Chest wall: No tenderness. Musculoskeletal:         General: Tenderness present. No swelling, deformity or signs of injury. Cervical back: Normal range of motion and neck supple. No rigidity. No muscular tenderness. Right lower leg: No edema. Left lower leg: No edema. Lymphadenopathy:      Cervical: No cervical adenopathy. Skin:     General: Skin is warm and dry. Capillary Refill: Capillary refill takes less than 2 seconds. Coloration: Skin is not jaundiced. Findings: No bruising, erythema, lesion or rash. Neurological:      Mental Status: She is alert and oriented to person, place, and time. Gait: Gait abnormal.   Psychiatric:         Mood and Affect: Mood normal.         Behavior: Behavior normal.         Thought Content:  Thought content normal.         Judgment: Judgment normal.

## 2023-09-21 LAB
DME PARACHUTE DELIVERY DATE ACTUAL: NORMAL
DME PARACHUTE DELIVERY DATE REQUESTED: NORMAL
DME PARACHUTE ITEM DESCRIPTION: NORMAL
DME PARACHUTE ORDER STATUS: NORMAL
DME PARACHUTE SUPPLIER NAME: NORMAL
DME PARACHUTE SUPPLIER PHONE: NORMAL

## 2023-09-22 ENCOUNTER — OFFICE VISIT (OUTPATIENT)
Dept: PODIATRY | Facility: CLINIC | Age: 33
End: 2023-09-22
Payer: COMMERCIAL

## 2023-09-22 VITALS
DIASTOLIC BLOOD PRESSURE: 83 MMHG | BODY MASS INDEX: 47.48 KG/M2 | HEIGHT: 63 IN | HEART RATE: 101 BPM | SYSTOLIC BLOOD PRESSURE: 140 MMHG | WEIGHT: 268 LBS

## 2023-09-22 DIAGNOSIS — M72.2 PLANTAR FASCIITIS OF LEFT FOOT: Primary | ICD-10-CM

## 2023-09-22 PROCEDURE — 99212 OFFICE O/P EST SF 10 MIN: CPT | Performed by: STUDENT IN AN ORGANIZED HEALTH CARE EDUCATION/TRAINING PROGRAM

## 2023-09-22 NOTE — PROGRESS NOTES
Assessment/Plan:    No problem-specific Assessment & Plan notes found for this encounter. Diagnoses and all orders for this visit:    Plantar fasciitis of left foot        Plan:     -  Patient was counseled and educated on the condition and the diagnosis. The diagnosis, treatment options and prognosis were discussed in detail.   -Recommended continue wearing supportive shoe gear and at home stretching and strengthening exercises. Continue using night splints.  -Addressed all questions and concerns  -Return as needed    Subjective:      Patient ID: Hyacinth Florence is a 29 y.o. female. 67-year-old female with past medical history as below presents for continued follow-up of left heel pain secondary [place] fasciitis. Patient reports she is feeling significantly better since last evaluated. She has stopped going to physical therapy as her pain has improved. She continues to stretch at home and wear supportive shoe gear. No other complaints at this time. The following portions of the patient's history were reviewed and updated as appropriate:   She  has a past medical history of Abnormal Pap smear of cervix, Allergic, Anxiety, Arthritis, Depression, Diabetes mellitus ([address]), Fibromyalgia, primary, Hypertension, IBS (irritable bowel syndrome), Migraine, Obesity, and Vitamin B12 deficiency.   She   Patient Active Problem List    Diagnosis Date Noted   • Dizziness 07/11/2023   • Left foot pain 07/11/2023   • Left ear pain 05/15/2023   • Facial pain 05/15/2023   • Nausea 03/30/2023   • Tenderness of left temporomandibular joint 11/14/2022   • Restless leg syndrome 11/11/2022   • Intertrigo 11/11/2022   • TMJ (dislocation of temporomandibular joint) 10/07/2022   • Bilateral temporomandibular joint pain 10/07/2022   • Panic disorder without agoraphobia 09/23/2022   • Intractable chronic migraine without aura and with status migrainosus 08/23/2022   • Prediabetes 08/23/2022   • Disequilibrium 08/03/2022   • Basilar migraine 08/03/2022   • Myofascial muscle pain 08/03/2022   • Physiological tremor 08/03/2022   • Costochondritis 07/21/2022   • Ankylosing spondylitis ([address]) 06/22/2022   • Functional diarrhea 05/23/2022   • Parasitic infection 05/10/2022   • Non-intractable vomiting with nausea 01/14/2022   • Blurred vision 12/08/2021   • Hypertension 12/08/2021   • Hyperinsulinemia 10/20/2021   • Gastroesophageal reflux disease without esophagitis 09/17/2021   • Morbid obesity with BMI of 40.0-44.9, adult ([address]) 08/04/2021   • TMJ pain dysfunction syndrome 06/08/2021   • Lower extremity edema 05/26/2021   • Cystitis 03/17/2021   • Pilar cysts 02/03/2021   • Dysplastic nevi 02/03/2021   • Encephalopathy 12/21/2020   • Arthralgia of multiple sites 11/02/2020   • Chronic low back pain 11/02/2020   • Elevated C-reactive protein 11/02/2020   • Inflammatory spondylopathy ([address]) 11/02/2020   • Knee pain 11/02/2020   • Elevated blood pressure reading 07/13/2020   • Pharyngitis due to Streptococcus species 06/02/2020   • Paresthesias 04/28/2020   • Well adult exam 79/39/2503   • Other complicated headache syndrome 09/10/2019   • Insomnia due to medical condition 09/10/2019   • Chronic heel pain, left 08/28/2019   • Irritant contact dermatitis due to oils 08/19/2019   • Depression, major, recurrent, moderate ([address]) 07/01/2019   • URI (upper respiratory infection) 04/24/2019   • Hand dermatitis 04/12/2019   • Intractable migraine with aura without status migrainosus 01/11/2019   • Sleep disturbance 11/27/2018   • Snoring 11/27/2018   • Fibromyalgia syndrome 11/27/2018   • Obesity (BMI 30-39.9) 11/27/2018   • Upper respiratory infection 11/13/2018   • Dental infection 09/12/2018   • Possible pregnancy 07/26/2018   • Migraine with aura and with status migrainosus 07/23/2018   • Cognitive decline 06/27/2018   • Memory loss 06/15/2018   • Chronic fatigue 05/24/2018   • Alternating constipation and diarrhea 04/17/2018   • Abnormal bowel habits 04/17/2018   • Chronic migraine without aura without status migrainosus, not intractable 03/13/2018   • Anterior chest wall pain 02/22/2018   • Migraine headache 06/22/2017   • Cervical radiculitis 06/10/2016   • Seasonal allergies 03/24/2015   • Lumbar radiculopathy 01/05/2015   • Vitamin B12 deficiency 07/30/2014   • Hyperlipidemia 03/24/2014   • Vitamin D deficiency 02/26/2014   • Generalized anxiety disorder 11/19/2012     She  has a past surgical history that includes Tonsillectomy; [place] tooth extraction; Colonoscopy (N/A, 5/8/2018); pr exc b9 lesion mrgn xcp sk tg s/n/h/f/g > 4.0cm (N/A, 7/1/2021); and pr repair complex scalp/arm/leg 2.6-7.5 cm (N/A, 7/1/2021). .    Review of Systems   All other systems reviewed and are negative. Objective:      /83   Pulse 101   Ht 5' 3" (1.6 m)   Wt 122 kg (268 lb)   BMI 47.47 kg/m²          Physical Exam  Vitals reviewed. Musculoskeletal:      Left foot: No tenderness. Comments: No pain with palpation noted to the left medial calcaneal tubercle at the level of plantar fascia origin. Mild edema noted on this region. No erythema.

## 2023-09-29 ENCOUNTER — TELEMEDICINE (OUTPATIENT)
Dept: BEHAVIORAL/MENTAL HEALTH CLINIC | Facility: CLINIC | Age: 33
End: 2023-09-29
Payer: COMMERCIAL

## 2023-09-29 DIAGNOSIS — F41.1 GENERALIZED ANXIETY DISORDER: Chronic | ICD-10-CM

## 2023-09-29 DIAGNOSIS — F33.1 DEPRESSION, MAJOR, RECURRENT, MODERATE (HCC): Primary | Chronic | ICD-10-CM

## 2023-09-29 PROCEDURE — 90834 PSYTX W PT 45 MINUTES: CPT | Performed by: SOCIAL WORKER

## 2023-09-29 NOTE — PSYCH
Virtual Regular Visit    Verification of patient location:    Patient is located at Home in the following state in which I hold an active license [place]    Assessment/Plan:    Problem List Items Addressed This Visit        Other    Generalized anxiety disorder (Chronic)    Depression, major, recurrent, moderate ([address]) - Primary (Chronic)     Goals addressed in session: Goal 1      Reason for visit is   Chief Complaint   Patient presents with   • Virtual Regular Visit     Encounter provider APARNA Mckeon    Provider located at [address] [place] [id number] [phone]    Recent Visits  No visits were found meeting these conditions. Showing recent visits within past 7 days and meeting all other requirements  Today's Visits  Date Type Provider Dept   09/29/23 [address] [place] [address], [address] [place] [address],[address] today's visits and meeting all other requirements  Future Appointments  No visits were found meeting these conditions. Showing future appointments within next 150 days and meeting all other requirements     The patient was identified by name and date of birth. Franc Morelos was informed that this is a telemedicine visit and [facility] She agrees to proceed. .  My office door was closed. No one else was in the room. She acknowledged consent and understanding of privacy and security of the video platform. The patient has agreed to participate and understands they can discontinue the visit at any time. Patient is aware this is a billable service. Nelda Mejia is a 29 y.o. female.     HPI     Past Medical History:   Diagnosis Date   • Abnormal Pap smear of cervix    • Allergic    • Anxiety    • Arthritis    • Depression    • Diabetes mellitus ([address])    • Fibromyalgia, primary    • Hypertension • IBS (irritable bowel syndrome)    • Migraine    • Obesity    • Vitamin B12 deficiency        Past Surgical History:   Procedure Laterality Date   • COLONOSCOPY N/A 5/8/2018    Procedure: COLONOSCOPY;  Surgeon: Neo Reyes MD;  Location: [facility] GI LAB; Service: Gastroenterology   • MD EXC B9 LESION MRGN XCP SK TG S/N/H/F/G > 4.0CM N/A 7/1/2021    Procedure: EXCISION OF PILAR SCALP CYST X 2 AND RIGHT INNER GROIN NEVVUS;  Surgeon: Priscila Osborne MD;  Location: [facility] MAIN OR;  Service: Plastics   • MD REPAIR COMPLEX SCALP/ARM/LEG 2.6-7.5 CM N/A 7/1/2021    Procedure: CLOSURE WOUND SCALP X 2 AND RIGHT INNER GROIN;  Surgeon: Priscila Osborne MD;  Location: [facility] MAIN OR;  Service: Plastics   • TONSILLECTOMY     • WISDOM TOOTH EXTRACTION         Current Outpatient Medications   Medication Sig Dispense Refill   • Aimovig 140 MG/ML SOAJ Inject 140mg (1 pen) under the skin every 30 days. 1 mL 11   • ALPRAZolam (XANAX) 0.5 mg tablet Take 1 tablet (0.5 mg total) by mouth 2 (two) times a day as needed for anxiety 60 tablet 2   • buPROPion (WELLBUTRIN XL) 300 mg 24 hr tablet Take 1 tablet (300 mg total) by mouth daily 90 tablet 0   • cefdinir (OMNICEF) 300 mg capsule      • Cholecalciferol (Vitamin D3) 50 MCG (2000 UT) capsule Take 1 capsule (2,000 Units total) by mouth daily 90 capsule 1   • cyanocobalamin 1,000 mcg/mL 1 IM injection every month 3 mL 3   • diazepam (VALIUM) 5 mg tablet Take 1 tablet (5 mg total) by mouth daily at bedtime as needed for sleep or muscle spasms 30 tablet 1   • dicyclomine (BENTYL) 20 mg tablet Take 1 tablet (20 mg total) by mouth 2 (two) times a day for 7 days 14 tablet 0   • escitalopram (LEXAPRO) 20 mg tablet Take 1 tablet (20 mg total) by mouth daily 90 tablet 0   • etonogestrel-ethinyl estradiol (NuvaRing) 0.12-0.015 MG/24HR vaginal ring Insert ring for 21 days then remove for one week.  3 each 4   • FeroSul 325 (65 Fe) MG tablet Take 1 tablet (325 mg total) by mouth in the morning 90 tablet 1 • indomethacin (INDOCIN) 25 mg capsule 1-2 tabs BID prn severe headache with food. Hold toradol. 30 capsule 0   • inFLIXimab-axxq (Avsola) 100 mg SOLR Inject 5 mg/kg into a catheter in a vein     • magnesium Oxide (MAG-OX) 400 mg TABS Take 1 tablet (400 mg total) by mouth daily 30 tablet 0   • OLANZapine (ZyPREXA) 2.5 mg tablet Take 1 tablet (2.5 mg total) by mouth daily Do not take with reglan. 30 tablet 2   • onabotulinumtoxin A (BOTOX) 100 units Inject as directed     • ondansetron (ZOFRAN-ODT) 4 mg disintegrating tablet Take 1 tablet (4 mg total) by mouth every 6 (six) hours as needed for nausea or vomiting 90 tablet 0   • pantoprazole (PROTONIX) 40 mg tablet Take 1 tablet (40 mg total) by mouth daily 30 tablet 3   • pramipexole (MIRAPEX) 0.25 mg tablet Take 1 tablet (0.25 mg total) by mouth daily at bedtime 34 tablet 3   • prazosin (MINIPRESS) 2 mg capsule Take 1 capsule (2 mg total) by mouth daily at bedtime 30 capsule 2   • Syringe/Needle, Disp, (SYRINGE 3CC/11KL5-4/2") 25G X 1-1/2" 3 ML MISC Use for B12 IM injections. (Patient not taking: Reported on 8/1/2023) 4 each 0   • Trudhesa 0.725 MG/ACT AERS 1 spray in each nostril for total dose of 1.45mg, may repeat 1 dose after 1 hour. Max 2 doses per 24 hours and 3 doses per week.  12 mL 6     Current Facility-Administered Medications   Medication Dose Route Frequency Provider Last Rate Last Admin   • cyanocobalamin injection 1,000 mcg  1,000 mcg Intramuscular Q30 Days Sharifa Adamon, DO   1,000 mcg at 08/03/20 7819   • cyanocobalamin injection 1,000 mcg  1,000 mcg Intramuscular Q14 Days Sharifa Adamon, DO   1,000 mcg at 05/18/20 1146   • cyanocobalamin injection 1,000 mcg  1,000 mcg Intramuscular Q30 Days Sharifa Adamon, DO   1,000 mcg at 09/01/20 1129        Allergies   Allergen Reactions   • Cimzia [Certolizumab Pegol] Swelling   • Desvenlafaxine      Other reaction(s): state of confusion   • Ixekizumab Vomiting   • Valproic Acid Other (See Comments)     Other reaction(s): dilated pupils, "schizi"   • Voltaren [Diclofenac] Swelling   • Tizanidine Anxiety       Review of Systems    Video Exam    There were no vitals filed for this visit. Physical Exam     Behavioral Health Psychotherapy Progress Note    Psychotherapy Provided: Individual Psychotherapy     1. Depression, major, recurrent, moderate ([address])        2. Generalized anxiety disorder            Goals addressed in session: Goal 1     DATA: Met with Stephanie for scheduled individual session. She states that she has been feeling physically unwell-- primarily related to the changes in the weather. She states that she feels that she was having some symptoms of depression after she completed her aquatherapy-- as she felt that the aquatherapy provided her with physical symptom relief. Beckylulú Rivero states that she has been interacting more with her AS and fibro- groups (on-line). She states that, overall, she is trying to be more interactive with other people. Stephanie discussed her relationship with Domonique's children. She states that she has been spending more time with Domonique's oldest daughter. She states that she feels very positive about this relationship. She states that the children refer to Becky Rivero as their father, and Daphne Lopez is supportive of this relationship between the girls and Becky [employer]. Becky Rivero reports that she and Daphne Lopez had a good talk recently. She reports accepting her own responsibility for their lack of communication. She states that she will continue to attempt to maintain open communication, and she is also working on continuing to broaden her support network. During this session, this clinician used the following therapeutic modalities: Client-centered Therapy, Dialectical Behavior Therapy, Mindfulness-based Strategies, Motivational Interviewing, Solution-Focused Therapy and Supportive Psychotherapy    Substance Abuse was not addressed during this session.  If the client is diagnosed with a co-occurring substance use disorder, please indicate any changes in the frequency or amount of use: n/a. Stage of change for addressing substance use diagnoses: No substance use/Not applicable    ASSESSMENT:  Pam Jones presents with a Euthymic/ normal mood. her affect is Normal range and intensity, which is congruent, with her mood and the content of the session. The client has made progress on their goals. Pam Jones presents with a minimal risk of suicide, minimal risk of self-harm, and minimal risk of harm to others. For any risk assessment that surpasses a "low" rating, a safety plan must be developed. A safety plan was indicated: no  If yes, describe in detail n/a    PLAN: Between sessions, Pam Jones will continue to monitor her moods. She will continue to engage in movement, as much as she is physically able. She will continue to work on increasing her socialization-- both in-real-life and through her on-line communities. She will continue to maintain her communication with her friends and will continue to set limits/boundaries. At the next session, the therapist will use Client-centered Therapy, Dialectical Behavior Therapy, Mindfulness-based Strategies, Motivational Interviewing, Solution-Focused Therapy and Supportive Psychotherapy to address her mood regulation, physical health concerns, and relationship issues. Behavioral Health Treatment Plan and Discharge Planning: Pam Jones is aware of and agrees to continue to work on their treatment plan. They have identified and are working toward their discharge goals.  yes    Visit start and stop times:    09/29/23  Start Time: 0806  Stop Time: 5016  Total Visit Time: 51 minutes

## 2023-10-04 ENCOUNTER — TELEMEDICINE (OUTPATIENT)
Dept: PSYCHIATRY | Facility: CLINIC | Age: 33
End: 2023-10-04
Payer: COMMERCIAL

## 2023-10-04 DIAGNOSIS — G43.709 CHRONIC MIGRAINE WITHOUT AURA WITHOUT STATUS MIGRAINOSUS, NOT INTRACTABLE: ICD-10-CM

## 2023-10-04 DIAGNOSIS — F41.1 GENERALIZED ANXIETY DISORDER: Chronic | ICD-10-CM

## 2023-10-04 DIAGNOSIS — F43.12 CHRONIC POST-TRAUMATIC STRESS DISORDER (PTSD): ICD-10-CM

## 2023-10-04 DIAGNOSIS — F33.1 DEPRESSION, MAJOR, RECURRENT, MODERATE (HCC): Primary | Chronic | ICD-10-CM

## 2023-10-04 PROCEDURE — 99213 OFFICE O/P EST LOW 20 MIN: CPT | Performed by: PSYCHIATRY & NEUROLOGY

## 2023-10-04 RX ORDER — CHOLECALCIFEROL (VITAMIN D3) 125 MCG
CAPSULE ORAL
COMMUNITY
Start: 2023-08-11

## 2023-10-04 RX ORDER — ASCORBIC ACID 500 MG
500 TABLET ORAL DAILY
COMMUNITY

## 2023-10-04 RX ORDER — ESCITALOPRAM OXALATE 20 MG/1
20 TABLET ORAL DAILY
Qty: 90 TABLET | Refills: 0 | Status: SHIPPED | OUTPATIENT
Start: 2023-10-04

## 2023-10-04 RX ORDER — PRAZOSIN HYDROCHLORIDE 2 MG/1
2 CAPSULE ORAL
Qty: 90 CAPSULE | Refills: 0 | Status: SHIPPED | OUTPATIENT
Start: 2023-10-04

## 2023-10-04 RX ORDER — ALPRAZOLAM 0.5 MG/1
0.5 TABLET ORAL 2 TIMES DAILY PRN
Qty: 60 TABLET | Refills: 2 | Status: SHIPPED | OUTPATIENT
Start: 2023-10-04

## 2023-10-04 RX ORDER — BUPROPION HYDROCHLORIDE 300 MG/1
300 TABLET ORAL DAILY
Qty: 90 TABLET | Refills: 0 | Status: SHIPPED | OUTPATIENT
Start: 2023-10-04

## 2023-10-04 RX ORDER — OLANZAPINE 2.5 MG/1
2.5 TABLET ORAL DAILY
Qty: 90 TABLET | Refills: 0 | Status: SHIPPED | OUTPATIENT
Start: 2023-10-04

## 2023-10-04 RX ORDER — PREDNISONE 5 MG/1
TABLET ORAL
COMMUNITY
Start: 2023-08-30

## 2023-10-04 NOTE — PSYCH
Virtual Regular Visit    Verification of patient location:    Patient is located at Home in the following state in which I hold an active license [place]      Assessment/Plan:    Problem List Items Addressed This Visit        Cardiovascular and Mediastinum    Migraine headache    Relevant Medications    escitalopram (LEXAPRO) 20 mg tablet    buPROPion (WELLBUTRIN XL) 300 mg 24 hr tablet    OLANZapine (ZyPREXA) 2.5 mg tablet       Other    Depression, major, recurrent, moderate (HCC) - Primary (Chronic)    Relevant Medications    ALPRAZolam (XANAX) 0.5 mg tablet    escitalopram (LEXAPRO) 20 mg tablet    buPROPion (WELLBUTRIN XL) 300 mg 24 hr tablet    OLANZapine (ZyPREXA) 2.5 mg tablet    Generalized anxiety disorder (Chronic)    Relevant Medications    ALPRAZolam (XANAX) 0.5 mg tablet    escitalopram (LEXAPRO) 20 mg tablet    buPROPion (WELLBUTRIN XL) 300 mg 24 hr tablet    OLANZapine (ZyPREXA) 2.5 mg tablet   Other Visit Diagnoses     Chronic post-traumatic stress disorder (PTSD)        Relevant Medications    ALPRAZolam (XANAX) 0.5 mg tablet    prazosin (MINIPRESS) 2 mg capsule    escitalopram (LEXAPRO) 20 mg tablet    buPROPion (WELLBUTRIN XL) 300 mg 24 hr tablet    OLANZapine (ZyPREXA) 2.5 mg tablet                   Reason for visit is   No chief complaint on file. Encounter provider Last Jaime MD    Provider located at [address] [place] [id number] [phone]      Recent Visits  Date Type Provider Dept   10/04/23 Mihir Claros MD [facility]   Showing recent visits within past 7 days and meeting all other requirements  Future Appointments  No visits were found meeting these conditions. Showing future appointments within next 150 days and meeting all other requirements       The patient was identified by name and date of birth.  Krunal Malik was informed that this is a telemedicine visit and [facility] She agrees to proceed. .  My office door was closed. No one else was in the room. She acknowledged consent and understanding of privacy and security of the video platform. The patient has agreed to participate and understands they can discontinue the visit at any time. Patient is aware this is a billable service. Yu Hampton is a 29 y.o. female with MDD,Panic do, BESSY . Marti Jara stated she continues to struggle with anxiety and frequent panic attacks. She tried switching to Xanax XR and her dose was titrated from 0.5 to 2 mg daily gradually. She felt the XR was less effective for her compared with her previous IR 0.5 mg bid prn. We switched  back to Xanax 0.5 mg po bid prn.   She continues to meet with her counselor on a regular basis.      Patient stated that since last seen she has been dealing with a lot of physical pain due to ankylosing spondylitis and plantar fasciitis. She stated mood wise she feels she has been stable. She is also taking medical cannabis which helps with her anxiety and also with her chronic pain. Today she reported having a fibromyalgia flare up. She is using coping strategies including light exercise and aqua therapy. She is working on a calorie deficit diet and possibly considering medication treatment to assist weight loss. She agrees to continue current treatment  and will schedule follow up in 3 months or sooner if needed.       HPI     Past Medical History:   Diagnosis Date   • Abnormal Pap smear of cervix    • Allergic    • Anxiety    • Arthritis    • Depression    • Diabetes mellitus ([address])    • Fibromyalgia, primary    • Hypertension    • IBS (irritable bowel syndrome)    • Migraine    • Obesity    • Vitamin B12 deficiency        Past Surgical History:   Procedure Laterality Date   • COLONOSCOPY N/A 5/8/2018    Procedure: COLONOSCOPY;  Surgeon: Braden Cox MD;  Location: [facility]; Service: Gastroenterology   • ID EXC B9 LESION MRGN XCP SK TG S/N/H/F/G > 4.0CM N/A 7/1/2021    Procedure: EXCISION OF PILAR SCALP CYST X 2 AND RIGHT INNER GROIN NEVVUS;  Surgeon: Mesha Baxter MD;  Location: [facility] MAIN OR;  Service: Plastics   • ID REPAIR COMPLEX SCALP/ARM/LEG 2.6-7.5 CM N/A 7/1/2021    Procedure: CLOSURE WOUND SCALP X 2 AND RIGHT INNER GROIN;  Surgeon: Mesha Baxter MD;  Location: [facility] MAIN OR;  Service: Plastics   • TONSILLECTOMY     • WISDOM TOOTH EXTRACTION         Current Outpatient Medications   Medication Sig Dispense Refill   • ALPRAZolam (XANAX) 0.5 mg tablet Take 1 tablet (0.5 mg total) by mouth 2 (two) times a day as needed for anxiety 60 tablet 2   • buPROPion (WELLBUTRIN XL) 300 mg 24 hr tablet Take 1 tablet (300 mg total) by mouth daily 90 tablet 0   • escitalopram (LEXAPRO) 20 mg tablet Take 1 tablet (20 mg total) by mouth daily 90 tablet 0   • OLANZapine (ZyPREXA) 2.5 mg tablet Take 1 tablet (2.5 mg total) by mouth daily Do not take with reglan. 90 tablet 0   • prazosin (MINIPRESS) 2 mg capsule Take 1 capsule (2 mg total) by mouth daily at bedtime 90 capsule 0   • Aimovig 140 MG/ML SOAJ Inject 140mg (1 pen) under the skin every 30 days.  1 mL 11   • ascorbic acid (VITAMIN C) 500 MG tablet Take 500 mg by mouth daily     • cefdinir (OMNICEF) 300 mg capsule      • Cholecalciferol (Vitamin D3) 50 MCG (2000 UT) capsule Take 1 capsule (2,000 Units total) by mouth daily 90 capsule 1   • Cholecalciferol (Vitamin D3) 50 MCG (2000 UT) TABS      • cyanocobalamin 1,000 mcg/mL 1 IM injection every month 3 mL 3   • diazepam (VALIUM) 5 mg tablet Take 1 tablet (5 mg total) by mouth daily at bedtime as needed for sleep or muscle spasms 30 tablet 1   • dicyclomine (BENTYL) 20 mg tablet Take 1 tablet (20 mg total) by mouth 2 (two) times a day for 7 days 14 tablet 0   • etonogestrel-ethinyl estradiol (NuvaRing) 0.12-0.015 MG/24HR vaginal ring Insert ring for 21 days then remove for one week. 3 each 4   • FeroSul 325 (65 Fe) MG tablet Take 1 tablet (325 mg total) by mouth in the morning 90 tablet 1   • indomethacin (INDOCIN) 25 mg capsule 1-2 tabs BID prn severe headache with food. Hold toradol. 30 capsule 0   • inFLIXimab-axxq (Avsola) 100 mg SOLR Inject 5 mg/kg into a catheter in a vein     • magnesium Oxide (MAG-OX) 400 mg TABS Take 1 tablet (400 mg total) by mouth daily 30 tablet 0   • onabotulinumtoxin A (BOTOX) 100 units Inject as directed     • ondansetron (ZOFRAN-ODT) 4 mg disintegrating tablet Take 1 tablet (4 mg total) by mouth every 6 (six) hours as needed for nausea or vomiting 90 tablet 0   • pantoprazole (PROTONIX) 40 mg tablet Take 1 tablet (40 mg total) by mouth daily 30 tablet 3   • pramipexole (MIRAPEX) 0.25 mg tablet Take 1 tablet (0.25 mg total) by mouth daily at bedtime 34 tablet 3   • predniSONE 5 mg tablet      • Trudhesa 0.725 MG/ACT AERS 1 spray in each nostril for total dose of 1.45mg, may repeat 1 dose after 1 hour. Max 2 doses per 24 hours and 3 doses per week.  12 mL 6     Current Facility-Administered Medications   Medication Dose Route Frequency Provider Last Rate Last Admin   • cyanocobalamin injection 1,000 mcg  1,000 mcg Intramuscular Q30 Days Mc [place], DO   1,000 mcg at 20 6307   • cyanocobalamin injection 1,000 mcg  1,000 mcg Intramuscular Q14 Days Iola , DO   1,000 mcg at 20 1146   • cyanocobalamin injection 1,000 mcg  1,000 mcg Intramuscular Q30 Days Mc , DO   1,000 mcg at 20 1129        Allergies   Allergen Reactions   • Cimzia [Certolizumab Pegol] Swelling   • Desvenlafaxine      Other reaction(s): state of confusion   • Ixekizumab Vomiting   • Valproic Acid Other (See Comments)     Other reaction(s): dilated pupils, "schizi"   • Voltaren [Diclofenac] Swelling   • Tizanidine Anxiety       Review of Systems    Mood Anxiety and Depression   Behavior Normal    Thought Content Disturbing Thoughts, Feelings   General Emotional Problems and Decreased Functioning   Personality Normal   Other Psych Symptoms Normal   Constitutional Negative   ENT Negative   Cardiovascular Negative   Respiratory Negative   Gastrointestinal Negative   Genitourinary Negative   Musculoskeletal Negative   Integumentary Negative   Neurological Negative   Endocrine Normal    Other Symptoms Normal        Laboratory Results:   Recent Labs (last 12 months):   Office Visit on 09/20/2023   Component Date Value   • Supplier Name 09/20/2023 [employer]    • Supplier Phone Number 09/20/2023 [phone]    • Order Status 09/20/2023 Delivery Successful    • Delivery Request Date 09/20/2023 09/20/2023    • Date Delivered  09/20/2023 09/21/2023    • Item Description 09/20/2023 Shower Chair (With Back) [$]    Office Visit on 09/15/2023   Component Date Value   •  RAPID STREP A 09/15/2023 Positive (A)    Admission on 08/10/2023, Discharged on 08/10/2023   Component Date Value   • SARS-CoV-2 08/10/2023 Negative    • INFLUENZA A PCR 08/10/2023 Negative    • INFLUENZA B PCR 08/10/2023 Negative    • RSV PCR 08/10/2023 Negative    Office Visit on 08/07/2023   Component Date Value   • LEUKOCYTE ESTERASE,UA 08/07/2023 15    • NITRITE,UA 08/07/2023 negative    • SL AMB POCT UROBILINOGEN 08/07/2023 negative    • POCT URINE PROTEIN 08/07/2023 300    •  PH,UA 08/07/2023 5.0    • BLOOD,UA 08/07/2023 trace    • SPECIFIC GRAVITY,UA 08/07/2023 1.020    • [place] 08/07/2023 80    • BILIRUBIN,UA 08/07/2023 negative    • GLUCOSE, UA 08/07/2023 negatives    •  COLOR,UA 08/07/2023 dark yellow    • CLARITY,UA 08/07/2023 cloudy    Office Visit on 08/01/2023   Component Date Value   • LEUKOCYTE ESTERASE,UA 08/01/2023 small    • Reda Bernabe 08/01/2023 neg    • SL AMB POCT UROBILINOGEN 08/01/2023 neg    • POCT URINE PROTEIN 08/01/2023 neg    •  PH,UA 08/01/2023 6.0    • BLOOD,UA 08/01/2023 trace    • SPECIFIC GRAVITY,UA 08/01/2023 1.025    • Jose Radar 08/01/2023 trace    • BILIRUBIN,UA 08/01/2023 neg    • GLUCOSE, UA 08/01/2023 neg    •  COLOR,UA 08/01/2023 yellow    • CLARITY,UA 08/01/2023 clear    • URINE HCG 08/01/2023 neg    • Urine Culture 08/01/2023 >100,000 cfu/ml    Admission on 02/19/2023, Discharged on 02/19/2023   Component Date Value   • WBC 02/19/2023 11.92 (H)    • RBC 02/19/2023 4.54    • Hemoglobin 02/19/2023 12.7    • Hematocrit 02/19/2023 38.8    • MCV 02/19/2023 86    • MCH 02/19/2023 28.0    • MCHC 02/19/2023 32.7    • RDW 02/19/2023 16.6 (H)    • MPV 02/19/2023 8.8 (L)    • Platelets 09/91/4612 482 (H)    • nRBC 02/19/2023 0    • Neutrophils Relative 02/19/2023 59    • Immat GRANS % 02/19/2023 0    • Lymphocytes Relative 02/19/2023 34    • Monocytes Relative 02/19/2023 6    • Eosinophils Relative 02/19/2023 1    • Basophils Relative 02/19/2023 0    • Neutrophils Absolute 02/19/2023 7.03    • Immature Grans Absolute 02/19/2023 0.04    • Lymphocytes Absolute 02/19/2023 3.99    • Monocytes Absolute 02/19/2023 0.76    • Eosinophils Absolute 02/19/2023 0.06    • Basophils Absolute 02/19/2023 0.04    • Sodium 02/19/2023 132 (L)    • Potassium 02/19/2023 4.1    • Chloride 02/19/2023 103    • CO2 02/19/2023 21    • ANION GAP 02/19/2023 8    • BUN 02/19/2023 14    • Creatinine 02/19/2023 0.72    • Glucose 02/19/2023 106    • Calcium 02/19/2023 9.5    • AST 02/19/2023 15    • ALT 02/19/2023 13    • Alkaline Phosphatase 02/19/2023 46    • Total Protein 02/19/2023 6.9    • Albumin 02/19/2023 3.9    • Total Bilirubin 02/19/2023 0.50    • eGFR 02/19/2023 111    • Lipase 02/19/2023 31    • EXT Preg Test, Ur 02/19/2023 Negative    • Control 02/19/2023 Valid    • Color, UA 02/19/2023 Yellow    • Clarity, UA 02/19/2023 Clear    • Specific Gravity, UA 02/19/2023 >=1.030 (H)    • pH, UA 02/19/2023 5.5    • Leukocytes, UA 02/19/2023 Negative    • Nitrite, UA 02/19/2023 Negative    • Protein, UA 02/19/2023 Negative    • Glucose, UA 02/19/2023 Negative • Ketones, UA 02/19/2023 Negative    • Urobilinogen, UA 02/19/2023 0.2    • Bilirubin, UA 02/19/2023 Negative    • Occult Blood, UA 02/19/2023 Negative    Admission on 01/29/2023, Discharged on 01/29/2023   Component Date Value   • WBC 01/29/2023 13.78 (H)    • RBC 01/29/2023 4.09    • Hemoglobin 01/29/2023 11.2 (L)    • Hematocrit 01/29/2023 34.3 (L)    • MCV 01/29/2023 84    • MCH 01/29/2023 27.4    • MCHC 01/29/2023 32.7    • RDW 01/29/2023 15.6 (H)    • MPV 01/29/2023 8.6 (L)    • Platelets 35/53/7820 484 (H)    • nRBC 01/29/2023 0    • Neutrophils Relative 01/29/2023 49    • Immat GRANS % 01/29/2023 0    • Lymphocytes Relative 01/29/2023 43    • Monocytes Relative 01/29/2023 7    • Eosinophils Relative 01/29/2023 1    • Basophils Relative 01/29/2023 0    • Neutrophils Absolute 01/29/2023 6.65    • Immature Grans Absolute 01/29/2023 0.04    • Lymphocytes Absolute 01/29/2023 5.88 (H)    • Monocytes Absolute 01/29/2023 1.02    • Eosinophils Absolute 01/29/2023 0.15    • Basophils Absolute 01/29/2023 0.04    • Sodium 01/29/2023 135    • Potassium 01/29/2023 3.4 (L)    • Chloride 01/29/2023 104    • CO2 01/29/2023 21    • ANION GAP 01/29/2023 10    • BUN 01/29/2023 11    • Creatinine 01/29/2023 0.78    • Glucose 01/29/2023 85    • Calcium 01/29/2023 8.7    • eGFR 01/29/2023 101    • SARS-CoV-2 01/29/2023 Negative    • INFLUENZA A PCR 01/29/2023 Negative    • INFLUENZA B PCR 01/29/2023 Negative    • RSV PCR 01/29/2023 Negative    • Color, UA 01/29/2023 Yellow    • Clarity, UA 01/29/2023 Cloudy (A)    • Specific Gravity, UA 01/29/2023 1.025    • pH, UA 01/29/2023 6.0    • Leukocytes, UA 01/29/2023 Trace (A)    • Nitrite, UA 01/29/2023 Negative    • Protein, UA 01/29/2023 Negative    • Glucose, UA 01/29/2023 Negative    • Ketones, UA 01/29/2023 Negative    • Urobilinogen, UA 01/29/2023 0.2    • Bilirubin, UA 01/29/2023 Negative    • Occult Blood, UA 01/29/2023 1+ (A)    • EXT Preg Test, Ur 01/29/2023 Negative    • Control 01/29/2023 Valid    • RBC, UA 01/29/2023 0-1    • WBC, UA 01/29/2023 2-4    • Epithelial Cells 01/29/2023 Moderate (A)    • Bacteria, UA 01/29/2023 Occasional    • MUCUS THREADS 01/29/2023 Occasional (A)    • Total Bilirubin 01/29/2023 0.50    • Bilirubin, Direct 01/29/2023 0.04    • Alkaline Phosphatase 01/29/2023 55    • AST 01/29/2023 9 (L)    • ALT 01/29/2023 10    • Total Protein 01/29/2023 6.5    • Albumin 01/29/2023 3.6    • Magnesium 01/29/2023 2.0    • Monotest 01/29/2023 Negative    Admission on 12/02/2022, Discharged on 12/02/2022   Component Date Value   • WBC 12/02/2022 9.29    • RBC 12/02/2022 4.22    • Hemoglobin 12/02/2022 11.4 (L)    • Hematocrit 12/02/2022 35.7    • MCV 12/02/2022 85    • MCH 12/02/2022 27.0    • MCHC 12/02/2022 31.9    • RDW 12/02/2022 13.9    • MPV 12/02/2022 8.9    • Platelets 56/93/7800 515 (H)    • nRBC 12/02/2022 0    • Neutrophils Relative 12/02/2022 48    • Immat GRANS % 12/02/2022 0    • Lymphocytes Relative 12/02/2022 39    • Monocytes Relative 12/02/2022 9    • Eosinophils Relative 12/02/2022 3    • Basophils Relative 12/02/2022 1    • Neutrophils Absolute 12/02/2022 4.46    • Immature Grans Absolute 12/02/2022 0.03    • Lymphocytes Absolute 12/02/2022 3.66    • Monocytes Absolute 12/02/2022 0.79    • Eosinophils Absolute 12/02/2022 0.30    • Basophils Absolute 12/02/2022 0.05    • Sodium 12/02/2022 137    • Potassium 12/02/2022 3.9    • Chloride 12/02/2022 110 (H)    • CO2 12/02/2022 20 (L)    • ANION GAP 12/02/2022 7    • BUN 12/02/2022 7    • Creatinine 12/02/2022 0.66    • Glucose 12/02/2022 86    • Calcium 12/02/2022 9.1    • Corrected Calcium 12/02/2022 10.0    • AST 12/02/2022 17    • ALT 12/02/2022 18    • Alkaline Phosphatase 12/02/2022 66    • Total Protein 12/02/2022 7.3    • Albumin 12/02/2022 2.9 (L)    • Total Bilirubin 12/02/2022 0.28    • eGFR 12/02/2022 118    • hs TnI 0hr 12/02/2022 <2    • Ventricular Rate 12/02/2022 81    • Atrial Rate 12/02/2022 81    • CO Interval 12/02/2022 166    • QRSD Interval 12/02/2022 90    • QT Interval 12/02/2022 378    • QTC Interval 12/02/2022 439    • P Axis 12/02/2022 39    • QRS Axis 12/02/2022 21    • T Wave [place] 12/02/2022 25    Admission on 11/25/2022, Discharged on 11/25/2022   Component Date Value   • D-Dimer, Quant 11/25/2022 0.51 (H)    • WBC 11/25/2022 7.16    • RBC 11/25/2022 3.87    • Hemoglobin 11/25/2022 10.8 (L)    • Hematocrit 11/25/2022 33.2 (L)    • MCV 11/25/2022 86    • MCH 11/25/2022 27.9    • MCHC 11/25/2022 32.5    • RDW 11/25/2022 13.5    • MPV 11/25/2022 8.6 (L)    • Platelets 28/66/4320 428 (H)    • nRBC 11/25/2022 0    • Neutrophils Relative 11/25/2022 54    • Immat GRANS % 11/25/2022 0    • Lymphocytes Relative 11/25/2022 37    • Monocytes Relative 11/25/2022 6    • Eosinophils Relative 11/25/2022 3    • Basophils Relative 11/25/2022 0    • Neutrophils Absolute 11/25/2022 3.80    • Immature Grans Absolute 11/25/2022 0.02    • Lymphocytes Absolute 11/25/2022 2.68    • Monocytes Absolute 11/25/2022 0.42    • Eosinophils Absolute 11/25/2022 0.22    • Basophils Absolute 11/25/2022 0.02    • Sodium 11/25/2022 135    • Potassium 11/25/2022 4.0    • Chloride 11/25/2022 105    • CO2 11/25/2022 19 (L)    • ANION GAP 11/25/2022 11    • BUN 11/25/2022 8    • Creatinine 11/25/2022 0.57 (L)    • Glucose 11/25/2022 92    • Calcium 11/25/2022 9.0    • eGFR 11/25/2022 123    • Ventricular Rate 11/25/2022 82    • Atrial Rate 11/25/2022 82    • CO Interval 11/25/2022 162    • QRSD Interval 11/25/2022 88    • QT Interval 11/25/2022 380    • QTC Interval 11/25/2022 443    • P Axis 11/25/2022 36    • QRS Axis 11/25/2022 17    • T Wave Axis 11/25/2022 12    Annual Exam on 11/08/2022   Component Date Value   • Case Report 11/08/2022                      Value:Gynecologic Cytology Report                       Case: [id number]                                  Authorizing Provider:  Umesh Lemos MD Collected:           11/08/2022 1344              Ordering Location:     [facility]  Received:            11/08/2022 [address]                                                           First Screen:          [facility]                                                                Specimen:    LIQUID-BASED PAP, SCREENING, Cervix                                                       • Primary Interpretation 11/08/2022 Negative for intraepithelial lesion or malignancy    • Specimen Adequacy 11/08/2022 Satisfactory for evaluation. Endocervical/transformation zone component present. • Additional Information 11/08/2022                      Value: This result contains rich text formatting which cannot be displayed here. • HPV Other HR 11/08/2022 Positive (A)    • HPV16 11/08/2022 Negative    • HPV18 11/08/2022 Negative    There may be more visits with results that are not included.      Substance Abuse History:  Social History     Substance and Sexual Activity   Drug Use Yes   • Frequency: 2.0 times per week   • Types: Marijuana    Comment: medical marijuana (vape)       Family Psychiatric History:   Family History   Problem Relation Age of Onset   • Rheum arthritis Mother    • Psoriasis Mother    • Other Mother    • Hypertension Mother    • Diabetes unspecified Mother    • Sjogren's syndrome Mother    • Alcohol abuse Mother    • Drug abuse Mother    • Anxiety disorder Father    • Alcohol abuse Father    • Lung cancer Maternal Grandfather    • Cancer Other         bone   • Diabetes Other    • Other Other         High blood pressure   • Depression Maternal Grandmother        The following portions of the patient's history were reviewed and updated as appropriate: allergies, current medications, past family history, past medical history, past social history, past surgical history and problem list.    Social History     Socioeconomic History   • Marital status: Single     Spouse name: Not on file   • Number of children: 0   • Years of education: 15   • Highest education level: Not on file   Occupational History   • Occupation: Unemployed     Employer: [employer]   Tobacco Use   • Smoking status: Never     Passive exposure: Past   • Smokeless tobacco: Never   Vaping Use   • Vaping Use: Some days   • Start date: 7/1/2019   • Substances: THC, CBD   Substance and Sexual Activity   • Alcohol use: Not Currently     Comment: rarely   • Drug use: Yes     Frequency: 2.0 times per week     Types: Marijuana     Comment: medical marijuana (vape)   • Sexual activity: Not Currently     Partners: Male     Comment: Nuva ring   Other Topics Concern   • Not on file   Social History Narrative    Home:  Living with mom and MGM        Education:    Pt denies any h/o learning disability Dxs but admits to having great difficulty in math requiring a . She reached childhood milestones on time as far as he knows. 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 On reflection, she feels it was moreso her anxiety as the cause of her leaving school, and she was using the other reason as an excuse so she would not have to admit to anxiety at that time. She is now very open about her anxiety and does not mind that I have this information in the general social section of her chart.      Social Determinants of Health     Financial Resource Strain: Low Risk  (8/16/2022)    Overall Financial Resource Strain (CARDIA)    • Difficulty of Paying Living Expenses: Not hard at all   Food Insecurity: No Food Insecurity (8/16/2022)    Hunger Vital Sign    • Worried About Running Out of Food in the Last Year: Never true    • Ran Out of Food in the Last Year: Never true   Transportation Needs: No Transportation Needs (8/16/2022)    PRAPARE - Transportation    • Lack of Transportation (Medical): No    • Lack of Transportation (Non-Medical): No   Physical Activity: Unknown (2/11/2021)    Exercise Vital Sign    • Days of Exercise per Week: 0 days    • Minutes of Exercise per Session: Not on file   Stress: Not on file   Social Connections: Not on file   Intimate Partner Violence: Not on file   Housing Stability: Not on file     Social History     Social History Narrative    Home:  Living with mom and MGM        Education:    Pt denies any h/o learning disability Dxs but admits to having great difficulty in math requiring a . She reached childhood milestones on time as far as he knows. 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 On reflection, she feels it was moreso her anxiety as the cause of her leaving school, and she was using the other reason as an excuse so she would not have to admit to anxiety at that time. She is now very open about her anxiety and does not mind that I have this information in the general social section of her chart.        Objective:       Mental status:  Appearance calm and cooperative , adequate hygiene and grooming and good eye contact    Mood dysphoric   Affect affect was constricted   Speech a normal rate and fluent   Thought Processes coherent/organized and normal thought processes   Hallucinations no hallucinations present    Thought Content no delusions   Abnormal Thoughts no suicidal thoughts  and no homicidal thoughts    Orientation  oriented to person and place and time   Remote Memory short term memory intact and long term memory intact   Attention Span concentration intact   Intellect Appears to be of Average Intelligence   Insight Limited insight Judgement judgment was limited   Muscle Strength n/a   Language no difficulty naming common objects and no difficulty repeating a phrase    Fund of Knowledge displays adequate knowledge of current events, adequate fund of knowledge regarding past history and adequate fund of knowledge regarding vocabulary                Assessment/Plan:       Diagnoses and all orders for this visit:    Depression, major, recurrent, moderate (HCC)    Generalized anxiety disorder  -     ALPRAZolam (XANAX) 0.5 mg tablet; Take 1 tablet (0.5 mg total) by mouth 2 (two) times a day as needed for anxiety  -     escitalopram (LEXAPRO) 20 mg tablet; Take 1 tablet (20 mg total) by mouth daily  -     buPROPion (WELLBUTRIN XL) 300 mg 24 hr tablet; Take 1 tablet (300 mg total) by mouth daily    Chronic post-traumatic stress disorder (PTSD)  -     prazosin (MINIPRESS) 2 mg capsule; Take 1 capsule (2 mg total) by mouth daily at bedtime    Chronic migraine without aura without status migrainosus, not intractable  -     OLANZapine (ZyPREXA) 2.5 mg tablet; Take 1 tablet (2.5 mg total) by mouth daily Do not take with reglan. Other orders  -     ascorbic acid (VITAMIN C) 500 MG tablet; Take 500 mg by mouth daily  -     Cholecalciferol (Vitamin D3) 50 MCG (2000 UT) TABS  -     predniSONE 5 mg tablet            Treatment Recommendations- Risks Benefits      Immediate Medical/Psychiatric/Psychotherapy Treatments and Any Precautions: continue current treatment     Risks, Benefits And Possible Side Effects Of Medications:  {PSYCH RISK, BENEFITS AND POSSIBLE SIDE EFFECTS (Optional):[id number]    Controlled Medication Discussion: Discussed with patient Black Box warning on concurrent use of benzodiazepines and opioid medications including sedation, respiratory depression, coma and death. Patient understands the risk of treatment with benzodiazepines in addition to opioids and wants to continue taking those medications.  , Discussed with patient the risks of sedation, respiratory depression, impairment of ability to drive and potential for abuse and addiction related to treatment with benzodiazepine medications. The patient understands risk of treatment with benzodiazepine medications, agrees to not drive if feels impaired and agrees to take medications as prescribed. and The patient has been filling controlled prescriptions on time as prescribed to [address] program.      Psychotherapy Provided: Individual psychotherapy provided. Individual psychotherapy provided: Yes  Counseling was provided during the session today for 16 minutes. Medications, treatment progress and treatment plan reviewed with Cruz. Medication education provided to Cruz. Goals discussed during in session: improve control of anxiety and improve control of depression. Recent stressor including relationship problems, health issues, medical problems, limited support, social difficulties, everyday stressors, ongoing anxiety and chronic mental illness discussed with Cruz. Coping strategies including compliance with medications, contacting a therapist, deep/slow breathing, eliminating avoidance, engaging in previously avoided activities, exercising, getting into a good routine, improving self-esteem, increasing energy, increasing interest in usual activities, increasing motivation, increasing social interaction, keeping busy at home, maintain healthy diet, maintain heathy sleeping hygiene and maintain positive attitude reviewed with Cruz. Importance of medication and treatment compliance reviewed with Cruz. Educated on importance of medication and treatment compliance. Importance of follow up with family physician for medical issues reviewed with Cruz. Discussed with Cruz acceptance of mental illness diagnosis and need for ongoing psychiatric treatment.   Supportive therapy provided.                  Visit Time    Visit Start Time: 11:00  Visit Stop Time: 11:30  Total Visit Duration: 30 minutes

## 2023-10-06 DIAGNOSIS — G43.709 CHRONIC MIGRAINE WITHOUT AURA WITHOUT STATUS MIGRAINOSUS, NOT INTRACTABLE: ICD-10-CM

## 2023-10-11 RX ORDER — INDOMETHACIN 25 MG/1
CAPSULE ORAL
Qty: 30 CAPSULE | Refills: 0 | Status: SHIPPED | OUTPATIENT
Start: 2023-10-11

## 2023-10-13 ENCOUNTER — TELEMEDICINE (OUTPATIENT)
Dept: BEHAVIORAL/MENTAL HEALTH CLINIC | Facility: CLINIC | Age: 33
End: 2023-10-13
Payer: COMMERCIAL

## 2023-10-13 DIAGNOSIS — F33.1 DEPRESSION, MAJOR, RECURRENT, MODERATE (HCC): Primary | Chronic | ICD-10-CM

## 2023-10-13 DIAGNOSIS — F41.1 GENERALIZED ANXIETY DISORDER: Chronic | ICD-10-CM

## 2023-10-13 PROCEDURE — 90837 PSYTX W PT 60 MINUTES: CPT | Performed by: SOCIAL WORKER

## 2023-10-13 NOTE — PSYCH
Virtual Regular Visit    Verification of patient location:    Patient is located at Home in the following state in which I hold an active license [place]      Assessment/Plan:    Problem List Items Addressed This Visit          Other    Generalized anxiety disorder (Chronic)    Depression, major, recurrent, moderate ([address]) - Primary (Chronic)       Goals addressed in session: Goal 1          Reason for visit is   Chief Complaint   Patient presents with    Virtual Regular Visit          Encounter provider APARNA Stone    Provider located at [address] [place] [id number] [phone]      Recent Visits  No visits were found meeting these conditions. Showing recent visits within past 7 days and meeting all other requirements  Today's Visits  Date Type Provider Dept   10/13/23 [address] [place] [address], [address] [place] [address],[address] today's visits and meeting all other requirements  Future Appointments  No visits were found meeting these conditions. Showing future appointments within next 150 days and meeting all other requirements       The patient was identified by name and date of birth. Shauna Norton was informed that this is a telemedicine visit and [facility] She agrees to proceed. .  My office door was closed. No one else was in the room. She acknowledged consent and understanding of privacy and security of the video platform. The patient has agreed to participate and understands they can discontinue the visit at any time. Patient is aware this is a billable service. Kylee Mittal is a 29 y.o. female.       HPI     Past Medical History:   Diagnosis Date    Abnormal Pap smear of cervix     Allergic     Anxiety     Arthritis     Depression     Diabetes mellitus (HCC)     Fibromyalgia, primary Hypertension     IBS (irritable bowel syndrome)     Migraine     Obesity     Vitamin B12 deficiency        Past Surgical History:   Procedure Laterality Date    COLONOSCOPY N/A 5/8/2018    Procedure: COLONOSCOPY;  Surgeon: Marcial Walsh MD;  Location: [facility]; Service: Gastroenterology    CA EXC B9 LESION MRGN XCP SK TG S/N/H/F/G > 4.0CM N/A 7/1/2021    Procedure: EXCISION OF PILAR SCALP CYST X 2 AND RIGHT INNER GROIN NEVVUS;  Surgeon: Karine Lew MD;  Location: [address] [place] [address];  Service: Plastics    CA REPAIR COMPLEX SCALP/ARM/LEG 2.6-7.5 CM N/A 7/1/2021    Procedure: CLOSURE WOUND SCALP X 2 AND RIGHT INNER GROIN;  Surgeon: Karine Lew MD;  Location: [address] [place] [address];  Service: Plastics    TONSILLECTOMY      WISDOM TOOTH EXTRACTION         Current Outpatient Medications   Medication Sig Dispense Refill    Aimovig 140 MG/ML SOAJ Inject 140mg (1 pen) under the skin every 30 days.  1 mL 11    ALPRAZolam (XANAX) 0.5 mg tablet Take 1 tablet (0.5 mg total) by mouth 2 (two) times a day as needed for anxiety 60 tablet 2    ascorbic acid (VITAMIN C) 500 MG tablet Take 500 mg by mouth daily      buPROPion (WELLBUTRIN XL) 300 mg 24 hr tablet Take 1 tablet (300 mg total) by mouth daily 90 tablet 0    cefdinir (OMNICEF) 300 mg capsule       Cholecalciferol (Vitamin D3) 50 MCG (2000 UT) capsule Take 1 capsule (2,000 Units total) by mouth daily 90 capsule 1    Cholecalciferol (Vitamin D3) 50 MCG (2000 UT) TABS       cyanocobalamin 1,000 mcg/mL 1 IM injection every month 3 mL 3    diazepam (VALIUM) 5 mg tablet Take 1 tablet (5 mg total) by mouth daily at bedtime as needed for sleep or muscle spasms 30 tablet 1    dicyclomine (BENTYL) 20 mg tablet Take 1 tablet (20 mg total) by mouth 2 (two) times a day for 7 days 14 tablet 0    escitalopram (LEXAPRO) 20 mg tablet Take 1 tablet (20 mg total) by mouth daily 90 tablet 0    etonogestrel-ethinyl estradiol (NuvaRing) 0.12-0.015 MG/24HR vaginal ring Insert ring for 21 days then remove for one week. 3 each 4    FeroSul 325 (65 Fe) MG tablet Take 1 tablet (325 mg total) by mouth in the morning 90 tablet 1    indomethacin (INDOCIN) 25 mg capsule 1-2 tabs BID prn severe headache with food. Hold toradol. 30 capsule 0    inFLIXimab-axxq (Avsola) 100 mg SOLR Inject 5 mg/kg into a catheter in a vein      magnesium Oxide (MAG-OX) 400 mg TABS Take 1 tablet (400 mg total) by mouth daily 30 tablet 0    OLANZapine (ZyPREXA) 2.5 mg tablet Take 1 tablet (2.5 mg total) by mouth daily Do not take with reglan. 90 tablet 0    onabotulinumtoxin A (BOTOX) 100 units Inject as directed      ondansetron (ZOFRAN-ODT) 4 mg disintegrating tablet Take 1 tablet (4 mg total) by mouth every 6 (six) hours as needed for nausea or vomiting 90 tablet 0    pantoprazole (PROTONIX) 40 mg tablet Take 1 tablet (40 mg total) by mouth daily 30 tablet 3    pramipexole (MIRAPEX) 0.25 mg tablet Take 1 tablet (0.25 mg total) by mouth daily at bedtime 34 tablet 3    prazosin (MINIPRESS) 2 mg capsule Take 1 capsule (2 mg total) by mouth daily at bedtime 90 capsule 0    predniSONE 5 mg tablet       Trudhesa 0.725 MG/ACT AERS 1 spray in each nostril for total dose of 1.45mg, may repeat 1 dose after 1 hour. Max 2 doses per 24 hours and 3 doses per week.  12 mL 6     Current Facility-Administered Medications   Medication Dose Route Frequency Provider Last Rate Last Admin    cyanocobalamin injection 1,000 mcg  1,000 mcg Intramuscular Q30 Days Rigoberto Donovan, DO   1,000 mcg at 08/03/20 0519    cyanocobalamin injection 1,000 mcg  1,000 mcg Intramuscular Q14 Days Rigoberto Donovan, DO   1,000 mcg at 05/18/20 1146    cyanocobalamin injection 1,000 mcg  1,000 mcg Intramuscular Q30 Days Rigoberto Donovan, DO   1,000 mcg at 09/01/20 1129        Allergies   Allergen Reactions    Cimzia [Certolizumab Pegol] Swelling    Desvenlafaxine      Other reaction(s): state of confusion    Ixekizumab Vomiting    Valproic Acid Other (See Comments)     Other reaction(s): dilated pupils, "schizi"    Voltaren [Diclofenac] Swelling    Tizanidine Anxiety       Review of Systems    Video Exam    There were no vitals filed for this visit. Physical Exam     Behavioral Health Psychotherapy Progress Note    Psychotherapy Provided: Individual Psychotherapy     1. Depression, major, recurrent, moderate ([address])        2. Generalized anxiety disorder            Goals addressed in session: Goal 1     DATA: Met with Stephanie for scheduled individual session. Gold Mireles discussed the events of the past couple weeks. She states that her grandmother was in the hospital again-- as a result of an infected cat scratch. She is doing better now; however, Gold Mireles states that she was very scared that her grandmother had given up. This clinician discussed some potential medical reasons that her grandmothers' mental health was impacted-- e.g., infections can cause cognitive impairment. Gold Mireles states that she is planning to meet up with a man who was a friend of her father's prior to his death. She is hoping to learn more about her father-- who she did not have an opportunity to meet. Gold Mireles discussed her fears that she will never meet someone who will be her partner. We discussed her grief/loss re: the loss of one of her male friends. She states that she fears that, if she does not continue to put focus on the memories. This clinician spoke with her about her memories and her fears and reinforced that she will not forget him. Gold Mireles states that she has been denied again for SSDI. She plans to continue the appeal process. She also discussed her desire to go back to school-- to study psychology. We talked about some supportive services (e.g., [facility]) that might be able to help her to explore options for returning to school. She agrees to look into this prior to our next session. Gold Mireles continues to report maintaining limits/boundaries with her friend.  She states that she feels good about the effective communication techniques that she is using to maintain open communication and strong boundaries. During this session, this clinician used the following therapeutic modalities: Client-centered Therapy, Dialectical Behavior Therapy, Mindfulness-based Strategies, Motivational Interviewing, Solution-Focused Therapy, and Supportive Psychotherapy    Substance Abuse was not addressed during this session. If the client is diagnosed with a co-occurring substance use disorder, please indicate any changes in the frequency or amount of use: n/a. Stage of change for addressing substance use diagnoses: No substance use/Not applicable    ASSESSMENT:  Autumn Fang presents with a Euthymic/ normal mood. her affect is Normal range and intensity, which is congruent, with her mood and the content of the session. The client has made progress on their goals. Autumn Fang presents with a minimal risk of suicide, minimal risk of self-harm, and minimal risk of harm to others. For any risk assessment that surpasses a "low" rating, a safety plan must be developed. A safety plan was indicated: no  If yes, describe in detail n/a    PLAN: Between sessions, Autumn Fang will continue to monitor her mood regulation and maintain boundaries with friends/family. She will investigate [facility] and options for pursuing a future career. At the next session, the therapist will use Client-centered Therapy, Dialectical Behavior Therapy, Mindfulness-based Strategies, Motivational Interviewing, Solution-Focused Therapy, and Supportive Psychotherapy to address her mood regulation and relationship concerns. Behavioral Health Treatment Plan and Discharge Planning: Autumn Fang is aware of and agrees to continue to work on their treatment plan. They have identified and are working toward their discharge goals.  yes    Visit start and stop times:    10/13/23  Start Time: 0803  Stop Time: 7794  Total Visit Time: 53 minutes

## 2023-10-17 ENCOUNTER — OFFICE VISIT (OUTPATIENT)
Dept: URGENT CARE | Facility: CLINIC | Age: 33
End: 2023-10-17
Payer: COMMERCIAL

## 2023-10-17 ENCOUNTER — TELEPHONE (OUTPATIENT)
Dept: NEUROLOGY | Facility: CLINIC | Age: 33
End: 2023-10-17

## 2023-10-17 VITALS
RESPIRATION RATE: 18 BRPM | SYSTOLIC BLOOD PRESSURE: 148 MMHG | BODY MASS INDEX: 47.48 KG/M2 | WEIGHT: 268 LBS | TEMPERATURE: 98.6 F | HEART RATE: 98 BPM | OXYGEN SATURATION: 98 % | HEIGHT: 63 IN | DIASTOLIC BLOOD PRESSURE: 83 MMHG

## 2023-10-17 DIAGNOSIS — J02.9 SORE THROAT: Primary | ICD-10-CM

## 2023-10-17 LAB
S PYO AG THROAT QL: NEGATIVE
SARS-COV-2 AG UPPER RESP QL IA: NEGATIVE
VALID CONTROL: NORMAL

## 2023-10-17 PROCEDURE — 87811 SARS-COV-2 COVID19 W/OPTIC: CPT | Performed by: NURSE PRACTITIONER

## 2023-10-17 PROCEDURE — 99213 OFFICE O/P EST LOW 20 MIN: CPT | Performed by: NURSE PRACTITIONER

## 2023-10-17 PROCEDURE — 87147 CULTURE TYPE IMMUNOLOGIC: CPT | Performed by: NURSE PRACTITIONER

## 2023-10-17 PROCEDURE — 87070 CULTURE OTHR SPECIMN AEROBIC: CPT | Performed by: NURSE PRACTITIONER

## 2023-10-17 NOTE — PATIENT INSTRUCTIONS
drink plenty of fluids, rest, saltwater gargles, lozenges, hot tea with honey. Tylenol or motrin for pain. If you develop a prolonged high fever, difficulty breathing, swallowing, managing secretions, decreased fluid intake, or urination, any new or concerning symptoms please return or proceed ER. Recommend following up with PCP in 3-5 days.

## 2023-10-17 NOTE — TELEPHONE ENCOUNTER
Patient is scheduled for [facility] 10/31 in [facility]. Called [employer] and spoke to Eri. Botox 200 UNITS  Qty. 1  Scheduled: 10/24  Location: [facility]  Via: Ups/[employer]       Please let us know if Botox does not arrive. Thanks!

## 2023-10-17 NOTE — PROGRESS NOTES
[facility]        NAME: Shauna Norton is a 29 y.o. female  : 1990    MRN: [id number]  DATE: 2023  TIME: 1:46 PM    Assessment and Plan   Sore throat [J02.9]  1. Sore throat  POCT rapid strepA    Throat culture    Poct Covid 19 Rapid Antigen Test        Strep and covid negative. Patient Instructions     Patient Instructions   drink plenty of fluids, rest, saltwater gargles, lozenges, hot tea with honey. Tylenol or motrin for pain. If you develop a prolonged high fever, difficulty breathing, swallowing, managing secretions, decreased fluid intake, or urination, any new or concerning symptoms please return or proceed ER. Recommend following up with PCP in 3-5 days. Chief Complaint     Chief Complaint   Patient presents with    Sore Throat     Patient c/o sore throat and headache that started last night. History of Present Illness       Sore Throat   This is a new problem. The current episode started yesterday. The problem has been unchanged. Neither side of throat is experiencing more pain than the other. There has been no fever. The pain is moderate. Associated symptoms include ear pain and headaches. Pertinent negatives include no abdominal pain, congestion, coughing, diarrhea, ear discharge, hoarse voice, plugged ear sensation, neck pain, shortness of breath, stridor, swollen glands, trouble swallowing or vomiting. She has had no exposure to strep or mono. She has tried nothing for the symptoms. The treatment provided no relief. Review of Systems   Review of Systems   Constitutional:  Positive for chills. Negative for diaphoresis, fatigue and fever. HENT:  Positive for ear pain and sore throat. Negative for congestion, ear discharge, facial swelling, hoarse voice, mouth sores, postnasal drip, rhinorrhea, sinus pressure, sinus pain and trouble swallowing. Eyes:  Negative for photophobia and visual disturbance.    Respiratory:  Negative for cough, chest tightness, shortness of breath and stridor. Cardiovascular:  Negative for chest pain. Gastrointestinal:  Negative for abdominal pain, diarrhea, nausea and vomiting. Genitourinary: Negative. Musculoskeletal:  Positive for myalgias. Negative for arthralgias, back pain, joint swelling, neck pain and neck stiffness. Skin:  Negative for rash. Neurological:  Positive for headaches. Negative for dizziness, facial asymmetry, weakness, light-headedness and numbness. Current Medications       Current Outpatient Medications:     Aimovig 140 MG/ML SOAJ, Inject 140mg (1 pen) under the skin every 30 days. , Disp: 1 mL, Rfl: 11    ALPRAZolam (XANAX) 0.5 mg tablet, Take 1 tablet (0.5 mg total) by mouth 2 (two) times a day as needed for anxiety, Disp: 60 tablet, Rfl: 2    ascorbic acid (VITAMIN C) 500 MG tablet, Take 500 mg by mouth daily, Disp: , Rfl:     buPROPion (WELLBUTRIN XL) 300 mg 24 hr tablet, Take 1 tablet (300 mg total) by mouth daily, Disp: 90 tablet, Rfl: 0    Cholecalciferol (Vitamin D3) 50 MCG (2000 UT) capsule, Take 1 capsule (2,000 Units total) by mouth daily, Disp: 90 capsule, Rfl: 1    Cholecalciferol (Vitamin D3) 50 MCG (2000 UT) TABS, , Disp: , Rfl:     cyanocobalamin 1,000 mcg/mL, 1 IM injection every month, Disp: 3 mL, Rfl: 3    escitalopram (LEXAPRO) 20 mg tablet, Take 1 tablet (20 mg total) by mouth daily, Disp: 90 tablet, Rfl: 0    etonogestrel-ethinyl estradiol (NuvaRing) 0.12-0.015 MG/24HR vaginal ring, Insert ring for 21 days then remove for one week., Disp: 3 each, Rfl: 4    FeroSul 325 (65 Fe) MG tablet, Take 1 tablet (325 mg total) by mouth in the morning, Disp: 90 tablet, Rfl: 1    indomethacin (INDOCIN) 25 mg capsule, 1-2 tabs BID prn severe headache with food.  Hold toradol., Disp: 30 capsule, Rfl: 0    inFLIXimab-axxq (Avsola) 100 mg SOLR, Inject 5 mg/kg into a catheter in a vein, Disp: , Rfl:     magnesium Oxide (MAG-OX) 400 mg TABS, Take 1 tablet (400 mg total) by mouth daily, Disp: 30 tablet, Rfl: 0    OLANZapine (ZyPREXA) 2.5 mg tablet, Take 1 tablet (2.5 mg total) by mouth daily Do not take with reglan., Disp: 90 tablet, Rfl: 0    onabotulinumtoxin A (BOTOX) 100 units, Inject as directed, Disp: , Rfl:     ondansetron (ZOFRAN-ODT) 4 mg disintegrating tablet, Take 1 tablet (4 mg total) by mouth every 6 (six) hours as needed for nausea or vomiting, Disp: 90 tablet, Rfl: 0    pantoprazole (PROTONIX) 40 mg tablet, Take 1 tablet (40 mg total) by mouth daily, Disp: 30 tablet, Rfl: 3    pramipexole (MIRAPEX) 0.25 mg tablet, Take 1 tablet (0.25 mg total) by mouth daily at bedtime, Disp: 34 tablet, Rfl: 3    prazosin (MINIPRESS) 2 mg capsule, Take 1 capsule (2 mg total) by mouth daily at bedtime, Disp: 90 capsule, Rfl: 0    Trudhesa 0.725 MG/ACT AERS, 1 spray in each nostril for total dose of 1.45mg, may repeat 1 dose after 1 hour.  Max 2 doses per 24 hours and 3 doses per week., Disp: 12 mL, Rfl: 6    cefdinir (OMNICEF) 300 mg capsule, , Disp: , Rfl:     diazepam (VALIUM) 5 mg tablet, Take 1 tablet (5 mg total) by mouth daily at bedtime as needed for sleep or muscle spasms (Patient not taking: Reported on 10/17/2023), Disp: 30 tablet, Rfl: 1    dicyclomine (BENTYL) 20 mg tablet, Take 1 tablet (20 mg total) by mouth 2 (two) times a day for 7 days (Patient not taking: Reported on 10/17/2023), Disp: 14 tablet, Rfl: 0    predniSONE 5 mg tablet, , Disp: , Rfl:     Current Facility-Administered Medications:     cyanocobalamin injection 1,000 mcg, 1,000 mcg, Intramuscular, Q30 Days, Heddy Lab, DO, 1,000 mcg at 08/03/20 6539    cyanocobalamin injection 1,000 mcg, 1,000 mcg, Intramuscular, Q14 Days, Heddy Lab, DO, 1,000 mcg at 05/18/20 1146    cyanocobalamin injection 1,000 mcg, 1,000 mcg, Intramuscular, Q30 Days, Timmy Gonzalez, DO, 1,000 mcg at 09/01/20 1129    Current Allergies     Allergies as of 10/17/2023 - Reviewed 10/17/2023   Allergen Reaction Noted    Sascha Lamas [certolizumab pegol] Swelling 03/22/2022    Desvenlafaxine  11/26/2012    Ixekizumab Vomiting 05/27/2022    Valproic acid Other (See Comments) 10/18/2017    Voltaren [diclofenac] Swelling 12/13/2022    Tizanidine Anxiety 01/20/2021            The following portions of the patient's history were reviewed and updated as appropriate: allergies, current medications, past family history, past medical history, past social history, past surgical history and problem list.     Past Medical History:   Diagnosis Date    Abnormal Pap smear of cervix     Allergic     Anxiety     Arthritis     Depression     Diabetes mellitus ([address])     Fibromyalgia, primary     Hypertension     IBS (irritable bowel syndrome)     Migraine     Obesity     Vitamin B12 deficiency        Past Surgical History:   Procedure Laterality Date    COLONOSCOPY N/A 5/8/2018    Procedure: COLONOSCOPY;  Surgeon: Princess Sanchez MD;  Location: [facility]; Service: Gastroenterology    WV EXC B9 LESION MRGN XCP SK TG S/N/H/F/G > 4.0CM N/A 7/1/2021    Procedure: EXCISION OF PILAR SCALP CYST X 2 AND RIGHT INNER GROIN NEVVUS;  Surgeon: Jarret Altamirano MD;  Location: [address] [place] [address];  Service: Plastics    WV REPAIR COMPLEX SCALP/ARM/LEG 2.6-7.5 CM N/A 7/1/2021    Procedure: CLOSURE WOUND SCALP X 2 AND RIGHT INNER GROIN;  Surgeon: Jarret Altamirano MD;  Location: [address] [place] [address];  Service: Plastics    TONSILLECTOMY      WISDOM TOOTH EXTRACTION         Family History   Problem Relation Age of Onset    Rheum arthritis Mother     Psoriasis Mother     Other Mother     Hypertension Mother     Diabetes unspecified Mother     Sjogren's syndrome Mother     Alcohol abuse Mother     Drug abuse Mother     Anxiety disorder Father     Alcohol abuse Father     Lung cancer Maternal Grandfather     Cancer Other         bone    Diabetes Other     Other Other         High blood pressure    Depression Maternal Grandmother          Medications have been verified.         Objective   /83   Pulse 98   Temp 98.6 °F (37 °C) (Temporal)   Resp 18   Ht 5' 3" (1.6 m)   Wt 122 kg (268 lb)   LMP  (LMP Unknown)   SpO2 98%   BMI 47.47 kg/m²   No LMP recorded (lmp unknown). Physical Exam     Physical Exam  Constitutional:       General: She is not in acute distress. Appearance: She is well-developed. HENT:      Head: Normocephalic and atraumatic. Right Ear: Hearing, tympanic membrane, ear canal and external ear normal.      Left Ear: Hearing, tympanic membrane, ear canal and external ear normal.      Nose: Rhinorrhea present. Right Sinus: No maxillary sinus tenderness or frontal sinus tenderness. Left Sinus: No maxillary sinus tenderness or frontal sinus tenderness. Mouth/Throat:      Mouth: Mucous membranes are moist.      Pharynx: Oropharynx is clear. Uvula midline. Posterior oropharyngeal erythema present. No pharyngeal swelling, oropharyngeal exudate or uvula swelling. Tonsils: 0 on the right. 0 on the left. Cardiovascular:      Rate and Rhythm: Normal rate and regular rhythm. Heart sounds: Normal heart sounds, S1 normal and S2 normal.   Pulmonary:      Effort: Pulmonary effort is normal.      Breath sounds: Normal breath sounds. Lymphadenopathy:      Cervical: No cervical adenopathy. Skin:     General: Skin is warm and dry. Capillary Refill: Capillary refill takes less than 2 seconds. Neurological:      Mental Status: She is alert and oriented to person, place, and time.

## 2023-10-20 LAB — BACTERIA THROAT CULT: ABNORMAL

## 2023-10-24 NOTE — TELEPHONE ENCOUNTER
Botox number of units: 200  Botox quantity: 1  Arrived at what location: [facility]  Botox at [facility]: yes  NDC number: 4595-6379-73  Lot number: [id number]  Expiration Date: 04/2026  Appt notes indicate correct medication: yes  Botox received scanned in invoice , Documented and stored in fridge.

## 2023-10-26 ENCOUNTER — TELEPHONE (OUTPATIENT)
Dept: NEUROLOGY | Facility: CLINIC | Age: 33
End: 2023-10-26

## 2023-10-26 NOTE — TELEPHONE ENCOUNTER
VM rcvd 10/26/23 at 10:23 am:    I am calling from [facility]. This message is in regard to patient. Jeison Caicedo, date of birth, 1990. We were calling to check the status of a prior authorization for Aimovig 140 milligrams. We are following up. . We have not heard anything from the plan. If you do have any update, please let us know by calling [phone]. Otherwise you can contact [address] [place] [address] directly to initiate the prior auth.  __________________________________________________________________________________________________    Routed to clinical team to follow up.

## 2023-10-27 ENCOUNTER — TELEMEDICINE (OUTPATIENT)
Dept: BEHAVIORAL/MENTAL HEALTH CLINIC | Facility: CLINIC | Age: 33
End: 2023-10-27
Payer: COMMERCIAL

## 2023-10-27 DIAGNOSIS — F33.1 DEPRESSION, MAJOR, RECURRENT, MODERATE (HCC): Primary | Chronic | ICD-10-CM

## 2023-10-27 DIAGNOSIS — F41.1 GENERALIZED ANXIETY DISORDER: Chronic | ICD-10-CM

## 2023-10-27 PROCEDURE — 90834 PSYTX W PT 45 MINUTES: CPT | Performed by: SOCIAL WORKER

## 2023-10-27 NOTE — PSYCH
Virtual Regular Visit    Verification of patient location:    Patient is located at Other in the following state in which I hold an active license [place]      Assessment/Plan:    Problem List Items Addressed This Visit          Other    Generalized anxiety disorder (Chronic)    Depression, major, recurrent, moderate ([address]) - Primary (Chronic)       Goals addressed in session: Goal 1          Reason for visit is   Chief Complaint   Patient presents with    Virtual Regular Visit          Encounter provider Laquita Apgar, SW    Provider located at [address] [place] [address] [address] [place] [id number] [phone]      Recent Visits  No visits were found meeting these conditions. Showing recent visits within past 7 days and meeting all other requirements  Today's Visits  Date Type Provider Dept   10/27/23 [address] [place] [address], [facility] [address] today's visits and meeting all other requirements  Future Appointments  No visits were found meeting these conditions. Showing future appointments within next 150 days and meeting all other requirements       The patient was identified by name and date of birth. Ebonie Markham was informed that this is a telemedicine visit and [facility] She agrees to proceed. .  My office door was closed. No one else was in the room. She acknowledged consent and understanding of privacy and security of the video platform. The patient has agreed to participate and understands they can discontinue the visit at any time. Patient is aware this is a billable service. Pastor Grant is a 29 y.o. female.     HPI     Past Medical History:   Diagnosis Date    Abnormal Pap smear of cervix     Allergic     Anxiety     Arthritis     Depression     Diabetes mellitus (HCC)     Fibromyalgia, primary Hypertension     IBS (irritable bowel syndrome)     Migraine     Obesity     Vitamin B12 deficiency        Past Surgical History:   Procedure Laterality Date    COLONOSCOPY N/A 5/8/2018    Procedure: COLONOSCOPY;  Surgeon: Federico Holt MD;  Location: [facility] GI LAB; Service: Gastroenterology    SD EXC B9 LESION MRGN XCP SK TG S/N/H/F/G > 4.0CM N/A 7/1/2021    Procedure: EXCISION OF PILAR SCALP CYST X 2 AND RIGHT INNER GROIN NEVVUS;  Surgeon: Say Kidd MD;  Location: [address] [place] [address];  Service: Plastics    SD REPAIR COMPLEX SCALP/ARM/LEG 2.6-7.5 CM N/A 7/1/2021    Procedure: CLOSURE WOUND SCALP X 2 AND RIGHT INNER GROIN;  Surgeon: Say Kidd MD;  Location: [address] [place] [address];  Service: Plastics    TONSILLECTOMY      WISDOM TOOTH EXTRACTION         Current Outpatient Medications   Medication Sig Dispense Refill    Aimovig 140 MG/ML SOAJ Inject 140mg (1 pen) under the skin every 30 days.  1 mL 11    ALPRAZolam (XANAX) 0.5 mg tablet Take 1 tablet (0.5 mg total) by mouth 2 (two) times a day as needed for anxiety 60 tablet 2    ascorbic acid (VITAMIN C) 500 MG tablet Take 500 mg by mouth daily      buPROPion (WELLBUTRIN XL) 300 mg 24 hr tablet Take 1 tablet (300 mg total) by mouth daily 90 tablet 0    cefdinir (OMNICEF) 300 mg capsule  (Patient not taking: Reported on 10/17/2023)      Cholecalciferol (Vitamin D3) 50 MCG (2000 UT) capsule Take 1 capsule (2,000 Units total) by mouth daily 90 capsule 1    Cholecalciferol (Vitamin D3) 50 MCG (2000 UT) TABS       cyanocobalamin 1,000 mcg/mL 1 IM injection every month 3 mL 3    diazepam (VALIUM) 5 mg tablet Take 1 tablet (5 mg total) by mouth daily at bedtime as needed for sleep or muscle spasms (Patient not taking: Reported on 10/17/2023) 30 tablet 1    dicyclomine (BENTYL) 20 mg tablet Take 1 tablet (20 mg total) by mouth 2 (two) times a day for 7 days (Patient not taking: Reported on 10/17/2023) 14 tablet 0    escitalopram (LEXAPRO) 20 mg tablet Take 1 tablet (20 mg total) by mouth daily 90 tablet 0    etonogestrel-ethinyl estradiol (NuvaRing) 0.12-0.015 MG/24HR vaginal ring Insert ring for 21 days then remove for one week. 3 each 4    FeroSul 325 (65 Fe) MG tablet Take 1 tablet (325 mg total) by mouth in the morning 90 tablet 1    indomethacin (INDOCIN) 25 mg capsule 1-2 tabs BID prn severe headache with food. Hold toradol. 30 capsule 0    inFLIXimab-axxq (Avsola) 100 mg SOLR Inject 5 mg/kg into a catheter in a vein      magnesium Oxide (MAG-OX) 400 mg TABS Take 1 tablet (400 mg total) by mouth daily 30 tablet 0    OLANZapine (ZyPREXA) 2.5 mg tablet Take 1 tablet (2.5 mg total) by mouth daily Do not take with reglan. 90 tablet 0    onabotulinumtoxin A (BOTOX) 100 units Inject as directed      ondansetron (ZOFRAN-ODT) 4 mg disintegrating tablet Take 1 tablet (4 mg total) by mouth every 6 (six) hours as needed for nausea or vomiting 90 tablet 0    pantoprazole (PROTONIX) 40 mg tablet Take 1 tablet (40 mg total) by mouth daily 30 tablet 3    pramipexole (MIRAPEX) 0.25 mg tablet Take 1 tablet (0.25 mg total) by mouth daily at bedtime 34 tablet 3    prazosin (MINIPRESS) 2 mg capsule Take 1 capsule (2 mg total) by mouth daily at bedtime 90 capsule 0    predniSONE 5 mg tablet  (Patient not taking: Reported on 10/17/2023)      Trudhesa 0.725 MG/ACT AERS 1 spray in each nostril for total dose of 1.45mg, may repeat 1 dose after 1 hour. Max 2 doses per 24 hours and 3 doses per week.  12 mL 6     Current Facility-Administered Medications   Medication Dose Route Frequency Provider Last Rate Last Admin    cyanocobalamin injection 1,000 mcg  1,000 mcg Intramuscular Q30 Days Brit Bijou, DO   1,000 mcg at 08/03/20 0859    cyanocobalamin injection 1,000 mcg  1,000 mcg Intramuscular Q14 Days Brit Bijou, DO   1,000 mcg at 05/18/20 1146    cyanocobalamin injection 1,000 mcg  1,000 mcg Intramuscular Q30 Days Brit Bijou, DO   1,000 mcg at 09/01/20 1129        Allergies   Allergen Reactions    Cimzia [Certolizumab Pegol] Swelling    Desvenlafaxine      Other reaction(s): state of confusion    Ixekizumab Vomiting    Valproic Acid Other (See Comments)     Other reaction(s): dilated pupils, "schizi"    Voltaren [Diclofenac] Swelling    Tizanidine Anxiety       Review of Systems    Video Exam    There were no vitals filed for this visit. Physical Exam     Behavioral Health Psychotherapy Progress Note    Psychotherapy Provided: Individual Psychotherapy     1. Depression, major, recurrent, moderate ([address])        2. Generalized anxiety disorder            Goals addressed in session: Goal 1     DATA: Met with Stephanie for scheduled individual session. She reports that she is at the hospital with her grandmother. Marti Jara discussed the situation and her concerns for her grandmother's overall health status. Her grandmother had a fall, which resulted in a broken sacrum. She then aspirated when taking medications and has developed a lung infection. Currently her grandmother is in the MICU and has had periods of unresponsiveness. Stephanie discussed her own PTSD related to her grandmother's past medical concerns and treatment issues. Marti Jara discussed her ability to understand the medical care that her grandmother is receiving. She states that some of the staff provided her with positive feedback regarding  her knowledge. She states that she has considered going into the medical field (possibly therapy). Marti Jara states that her friend, Johana López, was very supportive of her when Marti Jara was getting ready to go to the hospital to see her grandmother. Marti Jara discussed her active use of coping skills -- e.g., taking time to practice breathing when feeling overwhelmed. She states that she and her mother call it their "Namaste moments." She states that the skills have been very helpful to her and her mother.  She talked about ways she is supporting her uncle, who is also struggling with his emotions related to his mother being in the hospital. Charmaine Goodson discussed her recent meeting with a friend of her father. She states that she learned some information about her her father and "I felt good after it." She talked about the recognition that she had, during that conversation, that she has lived longer than her father. During this session, this clinician used the following therapeutic modalities: Client-centered Therapy, Dialectical Behavior Therapy, Mindfulness-based Strategies, Motivational Interviewing, Solution-Focused Therapy, and Supportive Psychotherapy    Substance Abuse was not addressed during this session. If the client is diagnosed with a co-occurring substance use disorder, please indicate any changes in the frequency or amount of use: n/a. Stage of change for addressing substance use diagnoses: No substance use/Not applicable    ASSESSMENT:  Brunilda Stone presents with an appropriately anxious mood. her affect is Normal range and intensity, which is congruent, with her mood and the content of the session. The client has made progress on their goals. Brunilda Stone presents with a minimal risk of suicide, minimal risk of self-harm, and minimal risk of harm to others. For any risk assessment that surpasses a "low" rating, a safety plan must be developed. A safety plan was indicated: no  If yes, describe in detail n/a    PLAN: Between sessions, Brunilda Stone will continue to monitor her moods. She will use her existing mindfulness skills to manage her anxiety. She will reach out for additional support, if needed. At the next session, the therapist will use Client-centered Therapy, Dialectical Behavior Therapy, Mindfulness-based Strategies, Motivational Interviewing, Solution-Focused Therapy, and Supportive Psychotherapy to address her mood regulation and relationships. Behavioral Health Treatment Plan and Discharge Planning: Brunilda Stone is aware of and agrees to continue to work on their treatment plan.  They have identified and are working toward their discharge goals.  yes    Visit start and stop times:    10/27/23  Start Time: 0810  Stop Time: 0856  Total Visit Time: 46 minutes

## 2023-10-31 ENCOUNTER — PROCEDURE VISIT (OUTPATIENT)
Dept: NEUROLOGY | Facility: CLINIC | Age: 33
End: 2023-10-31
Payer: COMMERCIAL

## 2023-10-31 VITALS — HEART RATE: 99 BPM | TEMPERATURE: 97.6 F | DIASTOLIC BLOOD PRESSURE: 72 MMHG | SYSTOLIC BLOOD PRESSURE: 133 MMHG

## 2023-10-31 DIAGNOSIS — G43.709 CHRONIC MIGRAINE WITHOUT AURA WITHOUT STATUS MIGRAINOSUS, NOT INTRACTABLE: Primary | ICD-10-CM

## 2023-10-31 PROCEDURE — 64615 CHEMODENERV MUSC MIGRAINE: CPT | Performed by: PHYSICIAN ASSISTANT

## 2023-10-31 RX ORDER — LANOLIN ALCOHOL/MO/W.PET/CERES
400 CREAM (GRAM) TOPICAL DAILY
Qty: 30 TABLET | Refills: 5 | Status: SHIPPED | OUTPATIENT
Start: 2023-10-31 | End: 2024-04-28

## 2023-10-31 NOTE — PROGRESS NOTES
Universal Protocol   Consent: Verbal consent obtained. Written consent obtained.   Risks and benefits: risks, benefits and alternatives were discussed  Consent given by: patient  Patient understanding: patient states understanding of the procedure being performed  Patient consent: the patient's understanding of the procedure matches consent given  Procedure consent: procedure consent matches procedure scheduled      Chemodenervation     Date/Time  10/31/2023 8:00 AM     Performed by  Carmenza Ramos PA-C   Authorized by  Carmenza Ramos PA-C     Pre-procedure details      Prepped With: Alcohol     Procedure details      Position:  Upright   Botox      Botox Type:  Type A    Brand:  Botox    mL's of Botulinum Toxin:  200    Final Concentration per CC:  100 units    Needle Gauge:  30 G 2.5 inch   Procedures      Botox Procedures: chronic headache      Indications: migraines     Injection Location      Head / Face:  L superior trapezius, R superior trapezius, L superior cervical paraspinal, R superior cervical paraspinal, L , R , procerus, L temporalis, R temporalis, R frontalis, L frontalis, R medial occipitalis and L medial occipitalis    L  injection amount:  5 unit(s)    R  injection amount:  5 unit(s)    L lateral frontalis:  5 unit(s)    R lateral frontalis:  5 unit(s)    L medial frontalis:  5 unit(s)    R medial frontalis:  5 unit(s)    L temporalis injection amount:  20 unit(s)    R temporalis injection amount:  20 unit(s)    Procerus injection amount:  5 unit(s)    L medial occipitalis injection amount:  15 unit(s)    R medial occipitalis injection amount:  15 unit(s)    L superior cervical paraspinal injection amount:  10 unit(s)    R superior cervical paraspinal injection amount:  10 unit(s)    L superior trapezius injection amount:  0 unit(s)    R superior trapezius injection amount:  0 unit(s)   Total Units      Total units used:  200    Total units discarded:  0 Post-procedure details      Chemodenervation:  Chronic migraine    Facial Nerve Location[de-identified]  Bilateral facial nerve    Patient tolerance of procedure: Tolerated well, no immediate complications   Comments       Medically necessary:  - 30 units between the R and L headband region  - 45 units between each anterior temporalis and scalp  Avoided traps b/l. Cold spray used. Blood pressure 133/72, pulse 99, temperature 97.6 °F (36.4 °C), temperature source Temporal, unknown if currently breastfeeding. She states trudhesa works well. She is concerned about smell sensitivity and this is more severe recently and triggering migraines. States she has not had a severe migraine in a while, but other smaller migraines with the smell triggers. We discussed adding supplements again, and if that fails consider mexiletine, or verapamil for prevention.

## 2023-11-02 NOTE — TELEPHONE ENCOUNTER
10/31/23 at 10:49    Good morning, my name is Ace Dunne from [facility] specialty pharmacy in regards to patient. Jenny Thomson, date of birth 1990. We was calling to check the status of my authorization for Aimovig, auto injector 140 milligram. We just wanted to get an update to see if we have anything in process. Any update. Please let us know by calling [phone]. Otherwise you can [facility] to initiate the PA.     Will initiate

## 2023-11-10 ENCOUNTER — OFFICE VISIT (OUTPATIENT)
Dept: FAMILY MEDICINE CLINIC | Facility: CLINIC | Age: 33
End: 2023-11-10
Payer: COMMERCIAL

## 2023-11-10 ENCOUNTER — TELEMEDICINE (OUTPATIENT)
Dept: BEHAVIORAL/MENTAL HEALTH CLINIC | Facility: CLINIC | Age: 33
End: 2023-11-10
Payer: COMMERCIAL

## 2023-11-10 VITALS
HEART RATE: 97 BPM | TEMPERATURE: 97.7 F | WEIGHT: 265 LBS | OXYGEN SATURATION: 97 % | SYSTOLIC BLOOD PRESSURE: 120 MMHG | DIASTOLIC BLOOD PRESSURE: 86 MMHG | HEIGHT: 63 IN | BODY MASS INDEX: 46.95 KG/M2

## 2023-11-10 DIAGNOSIS — R51.9 FACIAL PAIN: ICD-10-CM

## 2023-11-10 DIAGNOSIS — F41.1 GENERALIZED ANXIETY DISORDER: Chronic | ICD-10-CM

## 2023-11-10 DIAGNOSIS — F33.1 DEPRESSION, MAJOR, RECURRENT, MODERATE (HCC): Primary | Chronic | ICD-10-CM

## 2023-11-10 DIAGNOSIS — Z00.00 WELL ADULT EXAM: Primary | ICD-10-CM

## 2023-11-10 DIAGNOSIS — E78.1 PURE HYPERTRIGLYCERIDEMIA: ICD-10-CM

## 2023-11-10 PROCEDURE — 90834 PSYTX W PT 45 MINUTES: CPT | Performed by: SOCIAL WORKER

## 2023-11-10 PROCEDURE — 99395 PREV VISIT EST AGE 18-39: CPT | Performed by: FAMILY MEDICINE

## 2023-11-10 NOTE — PSYCH
Virtual Regular Visit    Verification of patient location:    Patient is located at Home in the following state in which I hold an active license [place]    Assessment/Plan:    Problem List Items Addressed This Visit          Other    Generalized anxiety disorder (Chronic)    Depression, major, recurrent, moderate ([address]) - Primary (Chronic)     Goals addressed in session: Goal 1      Reason for visit is   Chief Complaint   Patient presents with    Virtual Regular Visit      Encounter provider APARNA Ramirez    Provider located at [address] [place] [address] [address] [place] [id number] [phone]    Recent Visits  No visits were found meeting these conditions. Showing recent visits within past 7 days and meeting all other requirements  Today's Visits  Date Type Provider Dept   11/10/23 [address] [place] [address], [address] [place] [address],[address] today's visits and meeting all other requirements  Future Appointments  No visits were found meeting these conditions. Showing future appointments within next 150 days and meeting all other requirements     The patient was identified by name and date of birth. Devon Grimes was informed that this is a telemedicine visit and [facility] She agrees to proceed. .  My office door was closed. No one else was in the room. She acknowledged consent and understanding of privacy and security of the video platform. The patient has agreed to participate and understands they can discontinue the visit at any time. Patient is aware this is a billable service. Rah Souza is a 29 y.o. female.     HPI     Past Medical History:   Diagnosis Date    Abnormal Pap smear of cervix     Allergic     Anxiety     Arthritis     Depression     Diabetes mellitus (HCC)     Fibromyalgia, primary     Hypertension     IBS (irritable bowel syndrome)     Migraine     Obesity     Vitamin B12 deficiency        Past Surgical History:   Procedure Laterality Date    COLONOSCOPY N/A 5/8/2018    Procedure: COLONOSCOPY;  Surgeon: Jer Gatica MD;  Location: [facility] GI LAB; Service: Gastroenterology    NH EXC B9 LESION MRGN XCP SK TG S/N/H/F/G > 4.0CM N/A 7/1/2021    Procedure: EXCISION OF PILAR SCALP CYST X 2 AND RIGHT INNER GROIN NEVVUS;  Surgeon: Maninder Gann MD;  Location: [facility] MAIN OR;  Service: Plastics    NH REPAIR COMPLEX SCALP/ARM/LEG 2.6-7.5 CM N/A 7/1/2021    Procedure: CLOSURE WOUND SCALP X 2 AND RIGHT INNER GROIN;  Surgeon: Maninder Gann MD;  Location: [facility] MAIN OR;  Service: Plastics    TONSILLECTOMY      WISDOM TOOTH EXTRACTION         Current Outpatient Medications   Medication Sig Dispense Refill    Aimovig 140 MG/ML SOAJ Inject 140mg (1 pen) under the skin every 30 days.  1 mL 11    ALPRAZolam (XANAX) 0.5 mg tablet Take 1 tablet (0.5 mg total) by mouth 2 (two) times a day as needed for anxiety 60 tablet 2    ascorbic acid (VITAMIN C) 500 MG tablet Take 500 mg by mouth daily      buPROPion (WELLBUTRIN XL) 300 mg 24 hr tablet Take 1 tablet (300 mg total) by mouth daily 90 tablet 0    cefdinir (OMNICEF) 300 mg capsule  (Patient not taking: Reported on 10/17/2023)      Cholecalciferol (Vitamin D3) 50 MCG (2000 UT) capsule Take 1 capsule (2,000 Units total) by mouth daily 90 capsule 1    Cholecalciferol (Vitamin D3) 50 MCG (2000 UT) TABS       cyanocobalamin 1,000 mcg/mL 1 IM injection every month 3 mL 3    diazepam (VALIUM) 5 mg tablet Take 1 tablet (5 mg total) by mouth daily at bedtime as needed for sleep or muscle spasms (Patient not taking: Reported on 10/17/2023) 30 tablet 1    dicyclomine (BENTYL) 20 mg tablet Take 1 tablet (20 mg total) by mouth 2 (two) times a day for 7 days (Patient not taking: Reported on 10/17/2023) 14 tablet 0    escitalopram (LEXAPRO) 20 mg tablet Take 1 tablet (20 mg total) by mouth daily 90 tablet 0    etonogestrel-ethinyl estradiol (NuvaRing) 0.12-0.015 MG/24HR vaginal ring Insert ring for 21 days then remove for one week. 3 each 4    FeroSul 325 (65 Fe) MG tablet Take 1 tablet (325 mg total) by mouth in the morning 90 tablet 1    indomethacin (INDOCIN) 25 mg capsule 1-2 tabs BID prn severe headache with food. Hold toradol. 30 capsule 0    inFLIXimab-axxq (Avsola) 100 mg SOLR Inject 5 mg/kg into a catheter in a vein      magnesium Oxide (MAG-OX) 400 mg TABS Take 1 tablet (400 mg total) by mouth daily 30 tablet 5    OLANZapine (ZyPREXA) 2.5 mg tablet Take 1 tablet (2.5 mg total) by mouth daily Do not take with reglan. 90 tablet 0    onabotulinumtoxin A (BOTOX) 100 units Inject as directed      ondansetron (ZOFRAN-ODT) 4 mg disintegrating tablet Take 1 tablet (4 mg total) by mouth every 6 (six) hours as needed for nausea or vomiting 90 tablet 0    pantoprazole (PROTONIX) 40 mg tablet Take 1 tablet (40 mg total) by mouth daily 30 tablet 3    pramipexole (MIRAPEX) 0.25 mg tablet Take 1 tablet (0.25 mg total) by mouth daily at bedtime 34 tablet 3    prazosin (MINIPRESS) 2 mg capsule Take 1 capsule (2 mg total) by mouth daily at bedtime 90 capsule 0    predniSONE 5 mg tablet  (Patient not taking: Reported on 10/17/2023)      Riboflavin 400 MG CAPS One tab daily 30 capsule 5    Trudhesa 0.725 MG/ACT AERS 1 spray in each nostril for total dose of 1.45mg, may repeat 1 dose after 1 hour. Max 2 doses per 24 hours and 3 doses per week.  12 mL 6     Current Facility-Administered Medications   Medication Dose Route Frequency Provider Last Rate Last Admin    cyanocobalamin injection 1,000 mcg  1,000 mcg Intramuscular Q30 Days Jamila Muniz DO   1,000 mcg at 08/03/20 0859    cyanocobalamin injection 1,000 mcg  1,000 mcg Intramuscular Q14 Days Jamila Muniz DO   1,000 mcg at 05/18/20 1146    cyanocobalamin injection 1,000 mcg  1,000 mcg Intramuscular Q30 Days Jamila Muniz DO   1,000 mcg at 09/01/20 1125 Allergies   Allergen Reactions    Cimzia [Certolizumab Pegol] Swelling    Desvenlafaxine      Other reaction(s): state of confusion    Ixekizumab Vomiting    Valproic Acid Other (See Comments)     Other reaction(s): dilated pupils, "schizi"    Voltaren [Diclofenac] Swelling    Tizanidine Anxiety       Review of Systems    Video Exam    There were no vitals filed for this visit. Physical Exam     Behavioral Health Psychotherapy Progress Note    Psychotherapy Provided: Individual Psychotherapy     1. Depression, major, recurrent, moderate ([address])        2. Generalized anxiety disorder            Goals addressed in session: Goal 1     DATA: Met with Stephanie for scheduled individual session. Stephanie spent the majority of time discussing her grandmother's hospitalization and the family relationships. Tessa Larose discussed her feeling that her grandmother is giving up, and she is feeling very sad about that. She states that her mother is her biggest support, as they are both dealing with her grandmother's health decline. Tessa Larose states that her uncle "really stepped up" and has been very helpful with his mother, during her hospitalization. Tessa Larose states that she has maintained communication with her best friend, and that relationship is going well. Tessa Larose states that she feels that Sue Martinez is "trying to be a better person with everything." Tessa Larose states that she has been giving her friend positive reinforcement, and she feels that their relationship is stronger. Tessa Larose discussed wanting to improve her nutrition and lose weight. We discussed making smaller changes, so she can manage the behavior changes as she is going through the stress associated with her grandmother's hospital. She states that she has started to take photos of meals that she makes for herself, as it provides her with positive reinforcement for healthy meals and healthy choices. Tessa Larose states that she is starting her SSDI appeal process.  She reports that she is having increased difficulty completing ADLs (e.g., showering). She states that she feels like she is going to pass out-- if she stands too long and if she puts her arms up. She states that she has been experiencing a lot of difficulty managing temperature regulation ("both internally and externally"). This clinician validated Stephanie's experiences and offered continued support and encouragement. She agrees to continue to work on using mindfulness-based strategies to manage her moods and her anxiety. During this session, this clinician used the following therapeutic modalities: Client-centered Therapy, Dialectical Behavior Therapy, Mindfulness-based Strategies, Motivational Interviewing, Solution-Focused Therapy, and Supportive Psychotherapy    Substance Abuse was not addressed during this session. If the client is diagnosed with a co-occurring substance use disorder, please indicate any changes in the frequency or amount of use: n/a. Stage of change for addressing substance use diagnoses: No substance use/Not applicable    ASSESSMENT:  Mouna Basurto presents with a Euthymic/ normal mood. her affect is Normal range and intensity, which is congruent, with her mood and the content of the session. The client has made progress on their goals. Mouna Basurto presents with a minimal risk of suicide, minimal risk of self-harm, and minimal risk of harm to others. For any risk assessment that surpasses a "low" rating, a safety plan must be developed. A safety plan was indicated: no  If yes, describe in detail n/a    PLAN: Between sessions, Mouna Basurto will continue to monitor her moods. She will continue to use her mindfulness-based strategies to manage her moods and her anxiety. She will continue to set limits with family/friends and practice spending time with the people who are most supportive of her. She will continue to engage in positive self-care.  At the next session, the therapist will use Client-centered Therapy, Dialectical Behavior Therapy, Mindfulness-based Strategies, Motivational Interviewing, Solution-Focused Therapy, and Supportive Psychotherapy to address her mood regulation and relationship concerns. Behavioral Health Treatment Plan and Discharge Planning: Brunilda Sotne is aware of and agrees to continue to work on their treatment plan. They have identified and are working toward their discharge goals.  yes    Visit start and stop times:    11/10/23  Start Time: 0802  Stop Time: 4422  Total Visit Time: 51 minutes

## 2023-11-10 NOTE — PROGRESS NOTES
Assessment/Plan: Vaccines reviewed. Patient may consider COVID-vaccine as well as flu shot. Other vaccines are up-to-date. Labs are up-to-date. Patient have lipid profile done. Patient will see ENT appropriately. Patient up-to-date with gynecologist.  No breast related issues. No early family history of colorectal cancer. Diagnoses and all orders for this visit:    Well adult exam    Pure hypertriglyceridemia  -     Lipid panel; Future    Facial pain  -     Ambulatory Referral to Otolaryngology; Future            Subjective:        Patient ID: Mario Miller is a 29 y.o. female. Patient is here for wellness exam.  Labs vaccines reviewed. No breast related issues. Patient is up-to-date with gynecologic care. Patient without any family history of colorectal cancer at an early age. Patient is seeing rheumatologist.  Patient is on Avsola. The following portions of the patient's history were reviewed and updated as appropriate: allergies, current medications, past family history, past medical history, past social history, past surgical history and problem list.      Review of Systems   Constitutional:  Positive for fatigue. HENT: Negative. Eyes: Negative. Respiratory: Negative. Cardiovascular: Negative. Gastrointestinal: Negative. Endocrine: Negative. Genitourinary: Negative. Musculoskeletal:  Positive for arthralgias. Skin: Negative. Allergic/Immunologic: Negative. Neurological: Negative. Hematological: Negative. Psychiatric/Behavioral: Negative. Objective:               /86 (BP Location: Right arm, Patient Position: Sitting, Cuff Size: Standard)   Pulse 97   Temp 97.7 °F (36.5 °C) (Temporal)   Ht 5' 3" (1.6 m)   Wt 120 kg (265 lb)   SpO2 97%   BMI 46.94 kg/m²          Physical Exam  Vitals and nursing note reviewed. Constitutional:       General: She is not in acute distress. Appearance: Normal appearance.  She is not ill-appearing, toxic-appearing or diaphoretic. HENT:      Head: Normocephalic and atraumatic. Right Ear: Tympanic membrane, ear canal and external ear normal. There is no impacted cerumen. Left Ear: Tympanic membrane, ear canal and external ear normal. There is no impacted cerumen. Nose: Nose normal. No congestion or rhinorrhea. Mouth/Throat:      Mouth: Mucous membranes are moist.      Pharynx: No oropharyngeal exudate or posterior oropharyngeal erythema. Eyes:      General: No scleral icterus. Right eye: No discharge. Left eye: No discharge. Neck:      Vascular: No carotid bruit. Cardiovascular:      Rate and Rhythm: Normal rate and regular rhythm. Pulses: Normal pulses. Heart sounds: Normal heart sounds. No murmur heard. No friction rub. No gallop. Pulmonary:      Effort: Pulmonary effort is normal. No respiratory distress. Breath sounds: Normal breath sounds. No stridor. No wheezing, rhonchi or rales. Chest:      Chest wall: No tenderness. Musculoskeletal:         General: No swelling, tenderness, deformity or signs of injury. Normal range of motion. Cervical back: Normal range of motion and neck supple. No rigidity. No muscular tenderness. Right lower leg: No edema. Left lower leg: No edema. Lymphadenopathy:      Cervical: No cervical adenopathy. Skin:     General: Skin is warm and dry. Capillary Refill: Capillary refill takes less than 2 seconds. Coloration: Skin is not jaundiced. Findings: No bruising, erythema, lesion or rash. Neurological:      Mental Status: She is alert and oriented to person, place, and time. Mental status is at baseline. Cranial Nerves: No cranial nerve deficit. Sensory: No sensory deficit. Motor: No weakness.       Coordination: Coordination normal.      Gait: Gait normal.   Psychiatric:         Mood and Affect: Mood normal.         Behavior: Behavior normal. Thought Content:  Thought content normal.         Judgment: Judgment normal.

## 2023-11-12 ENCOUNTER — PATIENT MESSAGE (OUTPATIENT)
Dept: FAMILY MEDICINE CLINIC | Facility: CLINIC | Age: 33
End: 2023-11-12

## 2023-11-12 DIAGNOSIS — Z13.1 ENCOUNTER FOR SCREENING FOR DIABETES MELLITUS: Primary | ICD-10-CM

## 2023-11-13 NOTE — BH TREATMENT PLAN
Outpatient Behavioral Health Psychotherapy Treatment Plan    Xander Lion  1990     Date of Initial Psychotherapy Assessment: June 26, 2019     Date of Current Treatment Plan: 11/10/23  Treatment Plan Target Date: 03/09/2/024  Treatment Plan Expiration Date: 05/08/2024    Diagnosis:   1. Depression, major, recurrent, moderate ([address])        2. Generalized anxiety disorder          Area(s) of Need: Mood regulation; relationships; physical health issues    Long Term Goal 1 (in the client's own words): I want to improve my physical health and continue to work on managing my anxiety and depression. Stage of Change: Action    Target Date for completion: November 10, 2024     Anticipated therapeutic modalities: client-centered therapy; dialectical behavior therapy; motivational interviewing     People identified to complete this goal: Andi Halal (client); Olivia Augustine (psychotherapist); Dr. Gladys Blancas (psychiatrist)      Objective 1: (identify the means of measuring success in meeting the objective): Andi Espinosa will meet with Dr. Gladys Blancas for medication management sessions. She will maintain adherence with her medication regimen. She reports no significant side effects at this time. She currently reports missing 1-2 doses of medications occasionally--primarily due to her medical issues and levels of fatigue. Update November 10, 2023: Andi Espinosa continues to meet with Dr. Gladys Blancas for medication management. Andi Espinosa maintains a high level of medication adherence. She will continue to discuss the efficacy of her medications in her individual sessions with her therapist. She will immediately report any concerns or side effects with her psychiatrist.       Objective 2: (identify the means of measuring success in meeting the objective): Andi Espinosa will participate in DBT-informed therapy-- to learn and practice a minimum of three distress-tolerance skills. She will discuss successes and barriers in her therapy sessions.     Update November 10, 2023: Stevo Cash continues to practice some mindfulness practices. She notes that, when she is in a "flare" of her physical health symptoms, she finds it harder to engage in mindfulness-based strategies. We will incorporate some mindfulness for pain management into her repertoire. Stevo Cash will continue to work on identifying a minimum of three distress tolerance skills that she can use in various situations, to help her manage her anxiety. Stevo Cash will continue to discuss successes and barriers in her therapy sessions. Objective 3: (identify the means of measuring success in meeting the objective): Stevo Cash will practice DBT-informed effective communication skills. She will continue to set limits/boundaries with friends/family and will discuss successes and barriers in her therapy sessions. Update November 10, 2023: Stephanie notes that she has improved her ability to set appropriate limits/boundaries in various relationships. She has identified that her relationship with her best friend has improved, as she has started to use effective communication skills to set limits. Stevo Cash is able to verbalize behaviors that others engage in that support her and behaviors that are not supportive. She is working on learning how to validate her friends' supportive behaviors and directly confront behaviors that hurt her feelings or bring her down. Stevo Cash continues to engage in positive relationships with her mother and her grandmother. She notes improvement in her relationship with her uncle. Objective 4: (identify the means of measuring success in meeting the objective): Stevo Cash will discuss medical issues and impact on MH symptoms. She will continue to find a balance between pushing her physical activity level and managing overall emotional and physical fatigue. We will discuss successes and barriers in her therapy sessions.     Update November 10, 2023: Stevo Cash states that she would like to focus her attention more on her physical health during this treatment planning period. She states that she continues to struggle with motivation and ability to engage in healthy behaviors-- especially when experiencing an increase in physical pain and other symptoms. We will increase the use of mindfulness-based strategies to manage pain. Becky Rivero will also continue to gently push herself to increase her levels of physical activity, as she is able to tolerate. Becky Rivero will continue to follow up with her physical health providers. I am currently under the care of a [facility] psychiatric provider: yes    My . [facility] psychiatric provider is: Dr. Weston Worthington am currently taking psychiatric medications: Yes, as prescribed    I feel that I will be ready for discharge from mental health care when I reach the following (measurable goal/objective): I will not have as much anxiety as I approach an event or an activity. I will just be able to do it, without having to over plan. For children and adults who have a legal guardian:   Has there been any change to custody orders and/or guardianship status? NA. If yes, attach updated documentation. Crisis plan will be updated at the next treatment plan update: by May 2024. Behavioral Health Treatment Plan [facility]: Diagnosis and Treatment Plan explained to [address] [place] [address] acknowledges an understanding of their diagnosis. Cleve Bernard agrees to this treatment plan (consent provided via email on November 10, 2023). The treatment plan is being sent to the client, [facility] This clinician will check for the signature at the next individual session. I have been offered a copy of this Treatment Plan.  Yes (via [address] [place] [address])

## 2023-11-14 ENCOUNTER — ANNUAL EXAM (OUTPATIENT)
Dept: OBGYN CLINIC | Facility: MEDICAL CENTER | Age: 33
End: 2023-11-14
Payer: COMMERCIAL

## 2023-11-14 VITALS
SYSTOLIC BLOOD PRESSURE: 128 MMHG | DIASTOLIC BLOOD PRESSURE: 82 MMHG | BODY MASS INDEX: 46.78 KG/M2 | HEIGHT: 63 IN | WEIGHT: 264 LBS

## 2023-11-14 DIAGNOSIS — N89.8 VAGINAL DISCHARGE: ICD-10-CM

## 2023-11-14 DIAGNOSIS — Z30.44 ENCOUNTER FOR SURVEILLANCE OF VAGINAL RING HORMONAL CONTRACEPTIVE DEVICE: ICD-10-CM

## 2023-11-14 DIAGNOSIS — Z01.419 ENCOUNTER FOR WELL WOMAN EXAM WITH ROUTINE GYNECOLOGICAL EXAM: Primary | ICD-10-CM

## 2023-11-14 PROCEDURE — 99395 PREV VISIT EST AGE 18-39: CPT | Performed by: OBSTETRICS & GYNECOLOGY

## 2023-11-14 PROCEDURE — G0476 HPV COMBO ASSAY CA SCREEN: HCPCS | Performed by: OBSTETRICS & GYNECOLOGY

## 2023-11-14 PROCEDURE — 87510 GARDNER VAG DNA DIR PROBE: CPT | Performed by: OBSTETRICS & GYNECOLOGY

## 2023-11-14 PROCEDURE — 87660 TRICHOMONAS VAGIN DIR PROBE: CPT | Performed by: OBSTETRICS & GYNECOLOGY

## 2023-11-14 PROCEDURE — 87480 CANDIDA DNA DIR PROBE: CPT | Performed by: OBSTETRICS & GYNECOLOGY

## 2023-11-14 RX ORDER — ETONOGESTREL AND ETHINYL ESTRADIOL VAGINAL .015; .12 MG/D; MG/D
RING VAGINAL
Qty: 3 EACH | Refills: 4 | Status: SHIPPED | OUTPATIENT
Start: 2023-11-14 | End: 2024-11-19

## 2023-11-14 NOTE — PROGRESS NOTES
ASSESSMENT & PLAN: Hyacinth Florence is a 29 y.o. Valeria Tsang with normal gynecologic exam.    1.  Routine well woman exam done today  2. Pap and HPV:  The patient's last pap and hpv was . It was normal.  HPV other positive   Pap and cotesting was done today. Current ASCCP Guidelines reviewed. 3.  The following were reviewed in today's visit: breast self exam, STD testing, family planning choices, exercise, and healthy diet. 4. Doing well on nuvaring desires to continue     CC:  Annual Gynecologic Examination    HPI: Hyacinth Florence is a 29 y.o. Valeria Tsang who presents for annual gynecologic examination. She has the following concerns:  none     Health Maintenance:    She wears her seatbelt routinely. She does perform regular monthly self breast exams. She feels safe at home. Past Medical History:   Diagnosis Date    Abnormal Pap smear of cervix     Allergic     Anxiety     Arthritis     Depression     Diabetes mellitus (HCC)     Fibromyalgia, primary     Hypertension     IBS (irritable bowel syndrome)     Migraine     Obesity     Vitamin B12 deficiency        Past Surgical History:   Procedure Laterality Date    COLONOSCOPY N/A 2018    Procedure: COLONOSCOPY;  Surgeon: Elsi Bonilla MD;  Location: [facility] GI LAB;   Service: Gastroenterology    AL EXC B9 LESION MRGN XCP SK TG S/N/H/F/G > 4.0CM N/A 2021    Procedure: EXCISION OF PILAR SCALP CYST X 2 AND RIGHT INNER GROIN NEVVUS;  Surgeon: Riana Mejia MD;  Location: [address] [place] [address] OR;  Service: Plastics    AL REPAIR COMPLEX SCALP/ARM/LEG 2.6-7.5 CM N/A 2021    Procedure: CLOSURE WOUND SCALP X 2 AND RIGHT INNER GROIN;  Surgeon: Riana Mejia MD;  Location: [address] [place] [address] OR;  Service: Plastics    TONSILLECTOMY      WISDOM TOOTH EXTRACTION         Past OB/Gyn History:  OB History          0    Para   0    Term   0       0    AB   0    Living   0         SAB   0    IAB   0    Ectopic   0    Multiple   0    Live Births   0 Family History   Problem Relation Age of Onset    Rheum arthritis Mother     Psoriasis Mother     Other Mother     Hypertension Mother     Diabetes unspecified Mother     Sjogren's syndrome Mother     Alcohol abuse Mother     Drug abuse Mother     Anxiety disorder Father     Alcohol abuse Father     Lung cancer Maternal Grandfather     Cancer Other         bone    Diabetes Other     Other Other         High blood pressure    Depression Maternal Grandmother        Social History:  Social History     Socioeconomic History    Marital status: Single     Spouse name: Not on file    Number of children: 0    Years of education: 12    Highest education level: Not on file   Occupational History    Occupation: Unemployed     Employer: [employer]   Tobacco Use    Smoking status: Never     Passive exposure: Past    Smokeless tobacco: Never   Vaping Use    Vaping Use: Some days    Start date: 7/1/2019    Substances: THC, CBD   Substance and Sexual Activity    Alcohol use: Not Currently     Comment: rarely    Drug use: Yes     Frequency: 2.0 times per week     Types: Marijuana     Comment: medical marijuana (vape)    Sexual activity: Not Currently     Partners: Male     Comment: Nuva ring   Other Topics Concern    Not on file   Social History Narrative    Home:  Living with mom and MGM        Education:    Pt denies any h/o learning disability Dxs but admits to having great difficulty in math requiring a . She reached childhood milestones on time as far as he knows. 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 On reflection, she feels it was moreso her anxiety as the cause of her leaving school, and she was using the other reason as an excuse so she would not have to admit to anxiety at that time.   She is now very open about her anxiety and does not mind that I have this information in the general social section of her chart. Social Determinants of Health     Financial Resource Strain: Low Risk  (8/16/2022)    Overall Financial Resource Strain (CARDIA)     Difficulty of Paying Living Expenses: Not hard at all   Food Insecurity: No Food Insecurity (8/16/2022)    Hunger Vital Sign     Worried About Running Out of Food in the Last Year: Never true     Ran Out of Food in the Last Year: Never true   Transportation Needs: No Transportation Needs (8/16/2022)    PRAPARE - Transportation     Lack of Transportation (Medical): No     Lack of Transportation (Non-Medical): No   Physical Activity: Unknown (2/11/2021)    Exercise Vital Sign     Days of Exercise per Week: 0 days     Minutes of Exercise per Session: Not on file   Stress: Not on file   Social Connections: Not on file   Intimate Partner Violence: Not on file   Housing Stability: Not on file         Allergies   Allergen Reactions    Cimzia [Certolizumab Pegol] Swelling    Desvenlafaxine      Other reaction(s): state of confusion    Ixekizumab Vomiting    Seasonal Ic [Octacosanol] Nasal Congestion    Valproic Acid Other (See Comments)     Other reaction(s): dilated pupils, "schizi"    Voltaren [Diclofenac] Swelling    Tizanidine Anxiety         Current Outpatient Medications:     Aimovig 140 MG/ML SOAJ, Inject 140mg (1 pen) under the skin every 30 days. , Disp: 1 mL, Rfl: 11    ALPRAZolam (XANAX) 0.5 mg tablet, Take 1 tablet (0.5 mg total) by mouth 2 (two) times a day as needed for anxiety, Disp: 60 tablet, Rfl: 2    ascorbic acid (VITAMIN C) 500 MG tablet, Take 500 mg by mouth daily, Disp: , Rfl:     buPROPion (WELLBUTRIN XL) 300 mg 24 hr tablet, Take 1 tablet (300 mg total) by mouth daily, Disp: 90 tablet, Rfl: 0    Cholecalciferol (Vitamin D3) 50 MCG (2000 UT) capsule, Take 1 capsule (2,000 Units total) by mouth daily, Disp: 90 capsule, Rfl: 1    Cholecalciferol (Vitamin D3) 50 MCG (2000 UT) TABS, , Disp: , Rfl:     cyanocobalamin 1,000 mcg/mL, 1 IM injection every month, Disp: 3 mL, Rfl: 3    escitalopram (LEXAPRO) 20 mg tablet, Take 1 tablet (20 mg total) by mouth daily, Disp: 90 tablet, Rfl: 0    etonogestrel-ethinyl estradiol (NuvaRing) 0.12-0.015 MG/24HR vaginal ring, Insert ring for 21 days then remove for one week., Disp: 3 each, Rfl: 4    FeroSul 325 (65 Fe) MG tablet, Take 1 tablet (325 mg total) by mouth in the morning, Disp: 90 tablet, Rfl: 1    indomethacin (INDOCIN) 25 mg capsule, 1-2 tabs BID prn severe headache with food. Hold toradol., Disp: 30 capsule, Rfl: 0    inFLIXimab-axxq (Avsola) 100 mg SOLR, Inject 5 mg/kg into a catheter in a vein, Disp: , Rfl:     magnesium Oxide (MAG-OX) 400 mg TABS, Take 1 tablet (400 mg total) by mouth daily, Disp: 30 tablet, Rfl: 5    OLANZapine (ZyPREXA) 2.5 mg tablet, Take 1 tablet (2.5 mg total) by mouth daily Do not take with reglan., Disp: 90 tablet, Rfl: 0    onabotulinumtoxin A (BOTOX) 100 units, Inject as directed, Disp: , Rfl:     ondansetron (ZOFRAN-ODT) 4 mg disintegrating tablet, Take 1 tablet (4 mg total) by mouth every 6 (six) hours as needed for nausea or vomiting, Disp: 90 tablet, Rfl: 0    pantoprazole (PROTONIX) 40 mg tablet, Take 1 tablet (40 mg total) by mouth daily, Disp: 30 tablet, Rfl: 3    pramipexole (MIRAPEX) 0.25 mg tablet, Take 1 tablet (0.25 mg total) by mouth daily at bedtime, Disp: 34 tablet, Rfl: 3    prazosin (MINIPRESS) 2 mg capsule, Take 1 capsule (2 mg total) by mouth daily at bedtime, Disp: 90 capsule, Rfl: 0    predniSONE 5 mg tablet, , Disp: , Rfl:     Riboflavin 400 MG CAPS, One tab daily, Disp: 30 capsule, Rfl: 5    Trudhesa 0.725 MG/ACT AERS, 1 spray in each nostril for total dose of 1.45mg, may repeat 1 dose after 1 hour.  Max 2 doses per 24 hours and 3 doses per week., Disp: 12 mL, Rfl: 6    Current Facility-Administered Medications:     cyanocobalamin injection 1,000 mcg, 1,000 mcg, Intramuscular, Q30 Days, Jimmie Bald, DO, 1,000 mcg at 08/03/20 2563    cyanocobalamin injection 1,000 mcg, 1,000 mcg, Intramuscular, Q14 Days, Jimmie Bald, DO, 1,000 mcg at 05/18/20 1146    cyanocobalamin injection 1,000 mcg, 1,000 mcg, Intramuscular, Q30 Days, Jimmie Bald, DO, 1,000 mcg at 09/01/20 1129      Review of Systems  Constitutional :no fever, feels well, no tiredness, no recent weight gain or loss  ENT: no ear ache, no loss of hearing, no nosebleeds or nasal discharge, no sore throat or hoarseness. Cardiovascular: no complaints of slow or fast heart beat, no chest pain, no palpitations, no leg claudication or lower extremity edema. Respiratory: no complaints of shortness of shortness of breath, no MAURO  Breasts:no complaints of breast pain, breast lump, or nipple discharge  Gastrointestinal: no complaints of abdominal pain, constipation, nausea, vomiting, or diarrhea or bloody stools  Genitourinary : no complaints of dysuria, incontinence, pelvic pain, no dysmenorrhea, vaginal discharge or abnormal vaginal bleeding and as noted in HPI. Musculoskeletal: no complaints of arthralgia, no myalgia, no joint swelling or stiffness, no limb pain or swelling. Integumentary: no complaints of skin rash or lesion, itching or dry skin  Neurological: no complaints of headache, no confusion, no numbness or tingling, no dizziness or fainting    Objective      /82   Ht 5' 3" (1.6 m)   Wt 120 kg (264 lb)   LMP  (LMP Unknown)   BMI 46.77 kg/m²   General:   appears stated age, cooperative, alert normal mood and affect   Lungs: clear to auscultation bilaterally   Breasts: normal appearance, no masses or tenderness   Abdomen: soft, non-tender, without masses or organomegaly   Vulva: normal   Vagina: normal vagina, no discharge, exudate, lesion, or erythema   Urethra: normal   Cervix: Normal, no discharge. Nontender.    Uterus: normal size, contour, position, consistency, mobility, non-tender   Adnexa: no mass, fullness, tenderness   Psychiatric orientation to person, place, and time: normal. mood and affect: normal

## 2023-11-15 LAB
CANDIDA RRNA VAG QL PROBE: NOT DETECTED
G VAGINALIS RRNA GENITAL QL PROBE: NOT DETECTED
T VAGINALIS RRNA GENITAL QL PROBE: NOT DETECTED

## 2023-11-16 LAB
HPV HR 12 DNA CVX QL NAA+PROBE: NEGATIVE
HPV16 DNA CVX QL NAA+PROBE: NEGATIVE
HPV18 DNA CVX QL NAA+PROBE: NEGATIVE

## 2023-11-21 ENCOUNTER — HOSPITAL ENCOUNTER (EMERGENCY)
Facility: HOSPITAL | Age: 33
Discharge: HOME | End: 2023-11-21
Attending: EMERGENCY MEDICINE
Payer: COMMERCIAL

## 2023-11-21 VITALS
HEIGHT: 63 IN | WEIGHT: 264 LBS | DIASTOLIC BLOOD PRESSURE: 86 MMHG | TEMPERATURE: 97.7 F | OXYGEN SATURATION: 98 % | SYSTOLIC BLOOD PRESSURE: 157 MMHG | HEART RATE: 83 BPM | RESPIRATION RATE: 16 BRPM | BODY MASS INDEX: 46.78 KG/M2

## 2023-11-21 DIAGNOSIS — G43.909 MIGRAINE HEADACHE: Primary | ICD-10-CM

## 2023-11-21 LAB
EXT PREGNANCY TEST URINE: NEGATIVE
EXT. CONTROL: NORMAL
FLUAV RNA RESP QL NAA+PROBE: NEGATIVE
FLUBV RNA RESP QL NAA+PROBE: NEGATIVE
RSV RNA RESP QL NAA+PROBE: NEGATIVE
SARS-COV-2 RNA RESP QL NAA+PROBE: NEGATIVE

## 2023-11-21 PROCEDURE — 96375 TX/PRO/DX INJ NEW DRUG ADDON: CPT

## 2023-11-21 PROCEDURE — 81025 URINE PREGNANCY TEST: CPT | Performed by: EMERGENCY MEDICINE

## 2023-11-21 PROCEDURE — 99284 EMERGENCY DEPT VISIT MOD MDM: CPT | Performed by: EMERGENCY MEDICINE

## 2023-11-21 PROCEDURE — 96365 THER/PROPH/DIAG IV INF INIT: CPT

## 2023-11-21 PROCEDURE — 0241U HB NFCT DS VIR RESP RNA 4 TRGT: CPT | Performed by: EMERGENCY MEDICINE

## 2023-11-21 PROCEDURE — 99284 EMERGENCY DEPT VISIT MOD MDM: CPT

## 2023-11-21 RX ORDER — ACETAMINOPHEN 325 MG/1
975 TABLET ORAL ONCE
Status: COMPLETED | OUTPATIENT
Start: 2023-11-21 | End: 2023-11-21

## 2023-11-21 RX ORDER — DEXAMETHASONE SODIUM PHOSPHATE 10 MG/ML
10 INJECTION, SOLUTION INTRAMUSCULAR; INTRAVENOUS ONCE
Status: COMPLETED | OUTPATIENT
Start: 2023-11-21 | End: 2023-11-21

## 2023-11-21 RX ORDER — METOCLOPRAMIDE HYDROCHLORIDE 5 MG/ML
10 INJECTION INTRAMUSCULAR; INTRAVENOUS ONCE
Status: COMPLETED | OUTPATIENT
Start: 2023-11-21 | End: 2023-11-21

## 2023-11-21 RX ORDER — KETOROLAC TROMETHAMINE 30 MG/ML
15 INJECTION, SOLUTION INTRAMUSCULAR; INTRAVENOUS ONCE
Status: COMPLETED | OUTPATIENT
Start: 2023-11-21 | End: 2023-11-21

## 2023-11-21 RX ORDER — MAGNESIUM SULFATE HEPTAHYDRATE 40 MG/ML
2 INJECTION, SOLUTION INTRAVENOUS ONCE
Status: COMPLETED | OUTPATIENT
Start: 2023-11-21 | End: 2023-11-21

## 2023-11-21 RX ADMIN — ACETAMINOPHEN 975 MG: 325 TABLET ORAL at 14:38

## 2023-11-21 RX ADMIN — DEXAMETHASONE SODIUM PHOSPHATE 10 MG: 10 INJECTION, SOLUTION INTRAMUSCULAR; INTRAVENOUS at 14:47

## 2023-11-21 RX ADMIN — MAGNESIUM SULFATE HEPTAHYDRATE 2 G: 40 INJECTION, SOLUTION INTRAVENOUS at 14:53

## 2023-11-21 RX ADMIN — KETOROLAC TROMETHAMINE 15 MG: 30 INJECTION, SOLUTION INTRAMUSCULAR at 14:45

## 2023-11-21 RX ADMIN — METOCLOPRAMIDE 10 MG: 5 INJECTION, SOLUTION INTRAMUSCULAR; INTRAVENOUS at 14:50

## 2023-11-21 NOTE — ED PROVIDER NOTES
History  Chief Complaint   Patient presents with    Headache     Has URI with triggered migraine. Has history of same. Light sensitivity, nausea. Patient is a 67-year-old female with a history of migraines, who presents for evaluation of sinus congestion, dry cough, headache. Patient says that she has been "sick" since Friday. She says she was around other people who had similar symptoms. She says that starting today she developed a frontal headache. She says that it is similar to her previous migraines but feels a little more intense. Is associated with some photophobia, nausea which is common with her migraines. She denies any trauma to her head. She denies any visual deficits. She denies any neck pain, lightheadedness or dizziness. Prior to Admission Medications   Prescriptions Last Dose Informant Patient Reported? Taking? ALPRAZolam (XANAX) 0.5 mg tablet   No No   Sig: Take 1 tablet (0.5 mg total) by mouth 2 (two) times a day as needed for anxiety   Aimovig 140 MG/ML SOAJ   No No   Sig: Inject 140mg (1 pen) under the skin every 30 days. Cholecalciferol (Vitamin D3) 50 MCG (2000 UT) TABS   Yes No   Cholecalciferol (Vitamin D3) 50 MCG (2000 UT) capsule   No No   Sig: Take 1 capsule (2,000 Units total) by mouth daily   FeroSul 325 (65 Fe) MG tablet   No No   Sig: Take 1 tablet (325 mg total) by mouth in the morning   OLANZapine (ZyPREXA) 2.5 mg tablet   No No   Sig: Take 1 tablet (2.5 mg total) by mouth daily Do not take with reglan. Riboflavin 400 MG CAPS   No No   Sig: One tab daily   Trudhesa 0.725 MG/ACT AERS   No No   Si spray in each nostril for total dose of 1.45mg, may repeat 1 dose after 1 hour. Max 2 doses per 24 hours and 3 doses per week.    ascorbic acid (VITAMIN C) 500 MG tablet   Yes No   Sig: Take 500 mg by mouth daily   buPROPion (WELLBUTRIN XL) 300 mg 24 hr tablet   No No   Sig: Take 1 tablet (300 mg total) by mouth daily   cyanocobalamin 1,000 mcg/mL   No No   Si IM injection every month   escitalopram (LEXAPRO) 20 mg tablet   No No   Sig: Take 1 tablet (20 mg total) by mouth daily   etonogestrel-ethinyl estradiol (NuvaRing) 0.12-0.015 MG/24HR vaginal ring   No No   Sig: Insert ring for 21 days then remove for one week. inFLIXimab-axxq (Avsola) 100 mg SOLR   Yes No   Sig: Inject 5 mg/kg into a catheter in a vein   indomethacin (INDOCIN) 25 mg capsule   No No   Si-2 tabs BID prn severe headache with food. Hold toradol.   magnesium Oxide (MAG-OX) 400 mg TABS   No No   Sig: Take 1 tablet (400 mg total) by mouth daily   onabotulinumtoxin A (BOTOX) 100 units  Self Yes No   Sig: Inject as directed   ondansetron (ZOFRAN-ODT) 4 mg disintegrating tablet   No No   Sig: Take 1 tablet (4 mg total) by mouth every 6 (six) hours as needed for nausea or vomiting   pantoprazole (PROTONIX) 40 mg tablet   No No   Sig: Take 1 tablet (40 mg total) by mouth daily   pramipexole (MIRAPEX) 0.25 mg tablet   No No   Sig: Take 1 tablet (0.25 mg total) by mouth daily at bedtime   prazosin (MINIPRESS) 2 mg capsule   No No   Sig: Take 1 capsule (2 mg total) by mouth daily at bedtime   predniSONE 5 mg tablet   Yes No      Facility-Administered Medications Last Administration Doses Remaining   cyanocobalamin injection 1,000 mcg 8/3/2020  8:59 AM    cyanocobalamin injection 1,000 mcg 2020 11:46 AM    cyanocobalamin injection 1,000 mcg 2020 11:29 AM           Past Medical History:   Diagnosis Date    Abnormal Pap smear of cervix     Allergic     Anxiety     Arthritis     Depression     Diabetes mellitus (HCC)     Fibromyalgia, primary     Hypertension     IBS (irritable bowel syndrome)     Migraine     Obesity     Vitamin B12 deficiency        Past Surgical History:   Procedure Laterality Date    COLONOSCOPY N/A 2018    Procedure: COLONOSCOPY;  Surgeon: Irving Jimenez MD;  Location: [facility];   Service: Gastroenterology    MN EXC B9 LESION MRGN XCP SK TG S/N/H/F/G > 4.0CM N/A 2021 Procedure: EXCISION OF PILAR SCALP CYST X 2 AND RIGHT INNER GROIN NEVVUS;  Surgeon: Priscila Osborne MD;  Location: [address] [place] [address] MAIN OR;  Service: Plastics    SC REPAIR COMPLEX SCALP/ARM/LEG 2.6-7.5 CM N/A 7/1/2021    Procedure: CLOSURE WOUND SCALP X 2 AND RIGHT INNER GROIN;  Surgeon: Priscila Osborne MD;  Location: [address] [place] [address] MAIN OR;  Service: Plastics    TONSILLECTOMY      WISDOM TOOTH EXTRACTION         Family History   Problem Relation Age of Onset    Rheum arthritis Mother     Psoriasis Mother     Other Mother     Hypertension Mother     Diabetes unspecified Mother     Sjogren's syndrome Mother     Alcohol abuse Mother     Drug abuse Mother     Anxiety disorder Father     Alcohol abuse Father     Lung cancer Maternal Grandfather     Cancer Other         bone    Diabetes Other     Other Other         High blood pressure    Depression Maternal Grandmother      I have reviewed and agree with the history as documented. E-Cigarette/Vaping    E-Cigarette Use Current Some Day User     Start Date 7/1/19     Comments medical marijuana      E-Cigarette/Vaping Substances    Nicotine No     THC Yes     CBD Yes     Flavoring No     Other No     Unknown No      Social History     Tobacco Use    Smoking status: Never     Passive exposure: Past    Smokeless tobacco: Never   Vaping Use    Vaping Use: Some days    Start date: 7/1/2019    Substances: THC, CBD   Substance Use Topics    Alcohol use: Not Currently     Comment: rarely    Drug use: Yes     Frequency: 2.0 times per week     Types: Marijuana     Comment: medical marijuana (vape)       Review of Systems   Constitutional:  Negative for fever and unexpected weight change. HENT:  Negative for congestion, ear pain, sore throat and trouble swallowing. Eyes:  Positive for photophobia. Negative for pain and redness. Respiratory:  Negative for cough, chest tightness and shortness of breath. Cardiovascular:  Negative for chest pain and leg swelling.    Gastrointestinal:  Positive for nausea. Negative for abdominal distention, abdominal pain, diarrhea and vomiting. Endocrine: Negative for polyuria. Genitourinary:  Negative for dysuria, hematuria, pelvic pain and vaginal bleeding. Musculoskeletal:  Negative for back pain and myalgias. Skin:  Negative for rash. Neurological:  Positive for headaches. Negative for dizziness, syncope, weakness and light-headedness. Physical Exam  Physical Exam  Vitals and nursing note reviewed. Constitutional:       General: She is not in acute distress. Appearance: She is well-developed. HENT:      Head: Normocephalic and atraumatic. Right Ear: External ear normal.      Left Ear: External ear normal.      Nose: Nose normal.      Mouth/Throat:      Mouth: Mucous membranes are moist.      Pharynx: No oropharyngeal exudate. Eyes:      Conjunctiva/sclera: Conjunctivae normal.      Pupils: Pupils are equal, round, and reactive to light. Cardiovascular:      Rate and Rhythm: Normal rate and regular rhythm. Heart sounds: Normal heart sounds. No murmur heard. No friction rub. No gallop. Pulmonary:      Effort: Pulmonary effort is normal. No respiratory distress. Breath sounds: Normal breath sounds. No wheezing or rales. Abdominal:      General: There is no distension. Palpations: Abdomen is soft. Tenderness: There is no abdominal tenderness. There is no guarding. Musculoskeletal:         General: No swelling, tenderness or deformity. Normal range of motion. Cervical back: Normal range of motion and neck supple. Lymphadenopathy:      Cervical: No cervical adenopathy. Skin:     General: Skin is warm and dry. Neurological:      General: No focal deficit present. Mental Status: She is alert and oriented to person, place, and time. Mental status is at baseline. Cranial Nerves: No cranial nerve deficit. Sensory: No sensory deficit. Motor: No weakness or abnormal muscle tone. Coordination: Coordination normal.         Vital Signs  ED Triage Vitals [11/21/23 1401]   Temperature Pulse Respirations Blood Pressure SpO2   97.7 °F (36.5 °C) 83 16 157/86 98 %      Temp Source Heart Rate Source Patient Position - Orthostatic VS BP Location FiO2 (%)   Tympanic Monitor Sitting Left arm --      Pain Score       8           Vitals:    11/21/23 1401   BP: 157/86   Pulse: 83   Patient Position - Orthostatic VS: Sitting         Visual Acuity      ED Medications  Medications   ketorolac (TORADOL) injection 15 mg (15 mg Intravenous Given 11/21/23 1445)   acetaminophen (TYLENOL) tablet 975 mg (975 mg Oral Given 11/21/23 1438)   metoclopramide (REGLAN) injection 10 mg (10 mg Intravenous Given 11/21/23 1450)   magnesium sulfate 2 g/50 mL IVPB (premix) 2 g (0 g Intravenous Stopped 11/21/23 1620)   dexamethasone (PF) (DECADRON) injection 10 mg (10 mg Intravenous Given 11/21/23 1447)       Diagnostic Studies  Results Reviewed       Procedure Component Value Units Date/Time    FLU/RSV/COVID - if FLU/RSV clinically relevant [[id number]]  (Normal) Collected: 11/21/23 1432    Lab Status: Final result Specimen: Nares from Nose Updated: 11/21/23 1528     SARS-CoV-2 Negative     INFLUENZA A PCR Negative     INFLUENZA B PCR Negative     RSV PCR Negative    Narrative:      FOR PEDIATRIC PATIENTS - copy/paste COVID Guidelines URL to browser: [email or web address]    SARS-CoV-2 assay is a Nucleic Acid Amplification assay intended for the  qualitative detection of nucleic acid from SARS-CoV-2 in nasopharyngeal  swabs. Results are for the presumptive identification of SARS-CoV-2 RNA. Positive results are indicative of infection with SARS-CoV-2, the virus  causing COVID-19, but do not rule out bacterial infection or co-infection  with other viruses.  Laboratories within the [facility] and its  territories are required to report all positive results to the appropriate  public health authorities. Negative results do not preclude SARS-CoV-2  infection and should not be used as the sole basis for treatment or other  patient management decisions. Negative results must be combined with  clinical observations, patient history, and epidemiological information. This test has not been FDA cleared or approved. This test has been authorized by FDA under an Emergency Use Authorization  (EUA). This test is only authorized for the duration of time the  declaration that circumstances exist justifying the authorization of the  emergency use of an in vitro diagnostic tests for detection of SARS-CoV-2  virus and/or diagnosis of COVID-19 infection under section 564(b)(1) of  the Act, 21 U. S.C. 398WRR-4(G)(5), unless the authorization is terminated  or revoked sooner. The test has been validated but independent review by FDA  and CLIA is pending. Test performed using Sensipasspert: This RT-PCR assay targets N2,  a region unique to SARS-CoV-2. A conserved region in the E-gene was chosen  for pan-Sarbecovirus detection which includes SARS-CoV-2. According to CMS-2020-01-R, this platform meets the definition of high-throughput technology. POCT pregnancy, urine [[id number]]  (Normal) Resulted: 11/21/23 1444    Lab Status: Final result Updated: 11/21/23 1444     EXT Preg Test, Ur Negative     Control Valid                   No orders to display              Procedures  Procedures         ED Course  ED Course as of 11/22/23 0701   Tue Nov 21, 2023   1528 Patient symptoms significantly improved. Feels okay for discharge. SBIRT 22yo+      Flowsheet Row Most Recent Value   Initial Alcohol Screen: US AUDIT-C     1. How often do you have a drink containing alcohol? 0 Filed at: 11/21/2023 1402   2. How many drinks containing alcohol do you have on a typical day you are drinking? 0 Filed at: 11/21/2023 1402   3b. FEMALE Any Age, or MALE 65+:  How often do you have 4 or more drinks on one occassion? 0 Filed at: 11/21/2023 1402   Audit-C Score 0 Filed at: 11/21/2023 1402   BRUCE: How many times in the past year have you. .. Used an illegal drug or used a prescription medication for non-medical reasons? Never Filed at: 11/21/2023 1402                      Medical Decision Making  41-year-old female presenting for evaluation of headache. Headache started earlier today. Similar to previous migraines in terms of location, photophobia, nausea. Says she had some sinus congestion starting on Friday with a dry cough. Has been around sick contacts. No fevers or chills. No neurologic deficits on exam.    Given history of chronic migraines, and history, believe this is secondary to a migraine. Also have differential viral URI. Will obtain COVID/flu/RSV. Will give migraine cocktail. Amount and/or Complexity of Data Reviewed  Labs: ordered. Risk  OTC drugs. Prescription drug management. Disposition  Final diagnoses:   Migraine headache     Time reflects when diagnosis was documented in both MDM as applicable and the Disposition within this note       Time User Action Codes Description Comment    11/21/2023  3:30 PM Nash Canas Add [S16.624] Migraine headache           ED Disposition       ED Disposition   Discharge    Condition   Stable    Date/Time   Tue Nov 21, 2023 [address] discharge to home/self care.                    Follow-up Information       Follow up With Specialties Details Why Contact Info Additional Information    your neurologist  Schedule an appointment as soon as possible for a visit  For follow up of migraines      [address] [place] [address] Emergency Department Emergency Medicine Go to  If symptoms worsen Nemesio Haider [id number] [phone] [address] [place] [address] Emergency Department, [facility], [address] [place] [address]            Discharge Medication List as of 11/21/2023  3:31 PM        CONTINUE these medications which have NOT CHANGED    Details   Aimovig 140 MG/ML SOAJ Inject 140mg (1 pen) under the skin every 30 days. , Normal      ALPRAZolam (XANAX) 0.5 mg tablet Take 1 tablet (0.5 mg total) by mouth 2 (two) times a day as needed for anxiety, Starting Wed 10/4/2023, Normal      ascorbic acid (VITAMIN C) 500 MG tablet Take 500 mg by mouth daily, Historical Med      buPROPion (WELLBUTRIN XL) 300 mg 24 hr tablet Take 1 tablet (300 mg total) by mouth daily, Starting Wed 10/4/2023, Normal      Cholecalciferol (Vitamin D3) 50 MCG (2000 UT) capsule Take 1 capsule (2,000 Units total) by mouth daily, Starting Fri 8/11/2023, Normal      Cholecalciferol (Vitamin D3) 50 MCG (2000 UT) TABS Starting Fri 8/11/2023, Historical Med      cyanocobalamin 1,000 mcg/mL 1 IM injection every month, Normal      escitalopram (LEXAPRO) 20 mg tablet Take 1 tablet (20 mg total) by mouth daily, Starting Wed 10/4/2023, Normal      etonogestrel-ethinyl estradiol (NuvaRing) 0.12-0.015 MG/24HR vaginal ring Insert ring for 21 days then remove for one week., Normal      FeroSul 325 (65 Fe) MG tablet Take 1 tablet (325 mg total) by mouth in the morning, Starting Fri 8/11/2023, Normal      indomethacin (INDOCIN) 25 mg capsule 1-2 tabs BID prn severe headache with food.  Hold toradol., Normal      inFLIXimab-axxq (Avsola) 100 mg SOLR Inject 5 mg/kg into a catheter in a vein, Historical Med      magnesium Oxide (MAG-OX) 400 mg TABS Take 1 tablet (400 mg total) by mouth daily, Starting Tue 10/31/2023, Until Sun 4/28/2024, Normal      OLANZapine (ZyPREXA) 2.5 mg tablet Take 1 tablet (2.5 mg total) by mouth daily Do not take with reglan., Starting Wed 10/4/2023, Normal      onabotulinumtoxin A (BOTOX) 100 units Inject as directed, Starting Tue 8/29/2017, Historical Med      ondansetron (ZOFRAN-ODT) 4 mg disintegrating tablet Take 1 tablet (4 mg total) by mouth every 6 (six) hours as needed for nausea or vomiting, Starting Thu 3/30/2023, Normal      pantoprazole (PROTONIX) 40 mg tablet Take 1 tablet (40 mg total) by mouth daily, Starting Mon 6/19/2023, Normal      pramipexole (MIRAPEX) 0.25 mg tablet Take 1 tablet (0.25 mg total) by mouth daily at bedtime, Starting Thu 7/13/2023, Normal      prazosin (MINIPRESS) 2 mg capsule Take 1 capsule (2 mg total) by mouth daily at bedtime, Starting Wed 10/4/2023, Normal      predniSONE 5 mg tablet Starting Wed 8/30/2023, Historical Med      Riboflavin 400 MG CAPS One tab daily, Normal      Trudhesa 0.725 MG/ACT AERS 1 spray in each nostril for total dose of 1.45mg, may repeat 1 dose after 1 hour. Max 2 doses per 24 hours and 3 doses per week., Normal             No discharge procedures on file.     PDMP Review         Value Time User    PDMP Reviewed  Yes 10/4/2023 11:12 AM Lacy Kaur MD            ED Provider  Electronically Signed by             Odalys Tao DO  11/22/23 0579

## 2023-11-24 ENCOUNTER — TELEMEDICINE (OUTPATIENT)
Dept: BEHAVIORAL/MENTAL HEALTH CLINIC | Facility: CLINIC | Age: 33
End: 2023-11-24
Payer: COMMERCIAL

## 2023-11-24 DIAGNOSIS — F41.1 GENERALIZED ANXIETY DISORDER: Chronic | ICD-10-CM

## 2023-11-24 DIAGNOSIS — F33.1 DEPRESSION, MAJOR, RECURRENT, MODERATE (HCC): Primary | Chronic | ICD-10-CM

## 2023-11-24 PROCEDURE — 90837 PSYTX W PT 60 MINUTES: CPT | Performed by: SOCIAL WORKER

## 2023-11-24 NOTE — PSYCH
Virtual Regular Visit    Verification of patient location:    Patient is located at Home in the following state in which I hold an active license [place]    Assessment/Plan:    Problem List Items Addressed This Visit          Other    Generalized anxiety disorder (Chronic)    Depression, major, recurrent, moderate ([address] ) - Primary (Chronic)     Goals addressed in session: Goal 1      Reason for visit is   Chief Complaint   Patient presents with    Virtual Regular Visit      Encounter provider APARNA Carey    Provider located at [address] [place] [id number] [phone]    Recent Visits  No visits were found meeting these conditions. Showing recent visits within past 7 days and meeting all other requirements  Today's Visits  Date Type Provider Dept   11/24/23 [address] [place] [address], [address] [place] [address],[address] today's visits and meeting all other requirements  Future Appointments  No visits were found meeting these conditions. Showing future appointments within next 150 days and meeting all other requirements       The patient was identified by name and date of birth. Ananya Landrum was informed that this is a telemedicine visit and that [facility]. She agrees to proceed. .  My office door was closed. No one else was in the room. She acknowledged consent and understanding of privacy and security of the video platform. The patient has agreed to participate and understands they can discontinue the visit at any time. Patient is aware this is a billable service. Renuka Garrett is a 29 y.o. female.     HPI     Past Medical History:   Diagnosis Date    Abnormal Pap smear of cervix     Allergic     Anxiety     Arthritis     Depression     Diabetes mellitus (HCC)     Fibromyalgia, primary     Hypertension IBS (irritable bowel syndrome)     Migraine     Obesity     Vitamin B12 deficiency        Past Surgical History:   Procedure Laterality Date    COLONOSCOPY N/A 5/8/2018    Procedure: COLONOSCOPY;  Surgeon: Ryne Morrell MD;  Location: [facility]; Service: Gastroenterology    UT EXC B9 LESION MRGN XCP SK TG S/N/H/F/G > 4.0CM N/A 7/1/2021    Procedure: EXCISION OF PILAR SCALP CYST X 2 AND RIGHT INNER GROIN NEVVUS;  Surgeon: Miah Nix MD;  Location: [facility] MAIN OR;  Service: Plastics    UT REPAIR COMPLEX SCALP/ARM/LEG 2.6-7.5 CM N/A 7/1/2021    Procedure: CLOSURE WOUND SCALP X 2 AND RIGHT INNER GROIN;  Surgeon: Miah Nix MD;  Location: [facility] MAIN OR;  Service: Plastics    TONSILLECTOMY      WISDOM TOOTH EXTRACTION         Current Outpatient Medications   Medication Sig Dispense Refill    Aimovig 140 MG/ML SOAJ Inject 140mg (1 pen) under the skin every 30 days. 1 mL 11    ALPRAZolam (XANAX) 0.5 mg tablet Take 1 tablet (0.5 mg total) by mouth 2 (two) times a day as needed for anxiety 60 tablet 2    ascorbic acid (VITAMIN C) 500 MG tablet Take 500 mg by mouth daily      buPROPion (WELLBUTRIN XL) 300 mg 24 hr tablet Take 1 tablet (300 mg total) by mouth daily 90 tablet 0    Cholecalciferol (Vitamin D3) 50 MCG (2000 UT) capsule Take 1 capsule (2,000 Units total) by mouth daily 90 capsule 1    Cholecalciferol (Vitamin D3) 50 MCG (2000 UT) TABS       cyanocobalamin 1,000 mcg/mL 1 IM injection every month 3 mL 3    escitalopram (LEXAPRO) 20 mg tablet Take 1 tablet (20 mg total) by mouth daily 90 tablet 0    etonogestrel-ethinyl estradiol (NuvaRing) 0.12-0.015 MG/24HR vaginal ring Insert ring for 21 days then remove for one week. 3 each 4    FeroSul 325 (65 Fe) MG tablet Take 1 tablet (325 mg total) by mouth in the morning 90 tablet 1    indomethacin (INDOCIN) 25 mg capsule 1-2 tabs BID prn severe headache with food. Hold toradol.  30 capsule 0    inFLIXimab-axxq (Avsola) 100 mg SOLR Inject 5 mg/kg into a catheter in a vein      magnesium Oxide (MAG-OX) 400 mg TABS Take 1 tablet (400 mg total) by mouth daily 30 tablet 5    OLANZapine (ZyPREXA) 2.5 mg tablet Take 1 tablet (2.5 mg total) by mouth daily Do not take with reglan. 90 tablet 0    onabotulinumtoxin A (BOTOX) 100 units Inject as directed      ondansetron (ZOFRAN-ODT) 4 mg disintegrating tablet Take 1 tablet (4 mg total) by mouth every 6 (six) hours as needed for nausea or vomiting 90 tablet 0    pantoprazole (PROTONIX) 40 mg tablet Take 1 tablet (40 mg total) by mouth daily 30 tablet 3    pramipexole (MIRAPEX) 0.25 mg tablet Take 1 tablet (0.25 mg total) by mouth daily at bedtime 34 tablet 3    prazosin (MINIPRESS) 2 mg capsule Take 1 capsule (2 mg total) by mouth daily at bedtime 90 capsule 0    predniSONE 5 mg tablet       Riboflavin 400 MG CAPS One tab daily 30 capsule 5    Trudhesa 0.725 MG/ACT AERS 1 spray in each nostril for total dose of 1.45mg, may repeat 1 dose after 1 hour. Max 2 doses per 24 hours and 3 doses per week. 12 mL 6     Current Facility-Administered Medications   Medication Dose Route Frequency Provider Last Rate Last Admin    cyanocobalamin injection 1,000 mcg  1,000 mcg Intramuscular Q30 Days Violette Alderman, DO   1,000 mcg at 08/03/20 1868    cyanocobalamin injection 1,000 mcg  1,000 mcg Intramuscular Q14 Days Violette Aritaerman, DO   1,000 mcg at 05/18/20 1146    cyanocobalamin injection 1,000 mcg  1,000 mcg Intramuscular Q30 Days Violette Alderman, DO   1,000 mcg at 09/01/20 1129        Allergies   Allergen Reactions    Cimzia [Certolizumab Pegol] Swelling    Desvenlafaxine      Other reaction(s): state of confusion    Ixekizumab Vomiting    Seasonal Ic [Octacosanol] Nasal Congestion    Valproic Acid Other (See Comments)     Other reaction(s): dilated pupils, "schizi"    Voltaren [Diclofenac] Swelling    Tizanidine Anxiety       Review of Systems    Video Exam    There were no vitals filed for this visit.     Physical Exam     Behavioral Health Psychotherapy Progress Note    Psychotherapy Provided: Individual Psychotherapy     1. Depression, major, recurrent, moderate ([address])        2. Generalized anxiety disorder            Goals addressed in session: Goal 1     DATA: Met with Stephanie for scheduled individual session. "It's never a dull moment." Jenni Johnson states that her grandmother is at home, and Jenni Johnson is assisting her mother with her grandmother's care. She states that her grandmother's mental status has changed significantly. Jenni Johnson states that she is struggling with her grandmother's mental state, and she attributes this to the pain medication that her grandmother is currently taking. She states that the doctors are cutting back her opiate pain medications and increasing her gabapentin. Jenni Johnson states that her mother "is a mess," as she is also struggling with her mother's mental state and the care that she needs to provide. Jenni Johnson states that her uncle continues to "step up" and help with his mother's care. In addition, Jenni Johnson states that he had to take her to the ED for treatment of a migraine. Jenni Johnson states that Marshall Perez went onto her [employer] profile, and she "told him off." She states that she is able to see that he read her message, and she blocked him from her account. We discussed her emotional response when she saw that he had looked at her profile. She states that she initially became angry, and then she felt anxious. She states, "I'm not that person anymore. I don't know who I was then." She states that she does not understand how she could "let it keep happening." We discussed ways that Jenni Johnson can continue to set her limits/boundaries with people in her life. Jenni Johnson discussed her medical health. She states that she went to the OB/GYN and was retested. She states that all of her tests are now negative! She states that she has been working on pushing herself to be more active when she is not feeling well.  She finds that she is developing a better understanding of when her body needs rest versus when she can push herself to be a bit more active. She states that she continues to have the goal of returning to school. During this session, this clinician used the following therapeutic modalities: Client-centered Therapy, Dialectical Behavior Therapy, Mindfulness-based Strategies, Motivational Interviewing, Solution-Focused Therapy, and Supportive Psychotherapy    Substance Abuse was not addressed during this session. If the client is diagnosed with a co-occurring substance use disorder, please indicate any changes in the frequency or amount of use: n/a. Stage of change for addressing substance use diagnoses: No substance use/Not applicable    ASSESSMENT:  Ninoska Turcios presents with a Euthymic/ normal mood. her affect is Normal range and intensity, which is congruent, with her mood and the content of the session. The client has made progress on their goals. Ninoska Turcios presents with a minimal risk of suicide, minimal risk of self-harm, and minimal risk of harm to others. For any risk assessment that surpasses a "low" rating, a safety plan must be developed. A safety plan was indicated: no  If yes, describe in detail n/a    PLAN: Between sessions, Ninoska Turcios will continue to monitor her moods. She will continue to work on setting/maintaining appropriate limits and boundaries. She will continue to focus on her physical health and the ways that she can improve her overall functioning. At the next session, the therapist will use Client-centered Therapy, Dialectical Behavior Therapy, Mindfulness-based Strategies, Motivational Interviewing, Solution-Focused Therapy, and Supportive Psychotherapy to address her mood regulation and relationship concerns. Behavioral Health Treatment Plan and Discharge Planning: Ninoska Turcios is aware of and agrees to continue to work on their treatment plan. They have identified and are working toward their discharge goals. yes    Visit start and stop times:    11/24/23  Start Time: 0840  Stop Time: 8741  Total Visit Time: 54 minutes

## 2023-11-27 LAB
LAB AP GYN PRIMARY INTERPRETATION: NORMAL
Lab: NORMAL

## 2023-11-29 ENCOUNTER — OFFICE VISIT (OUTPATIENT)
Dept: FAMILY MEDICINE CLINIC | Facility: CLINIC | Age: 33
End: 2023-11-29
Payer: COMMERCIAL

## 2023-11-29 VITALS
OXYGEN SATURATION: 98 % | SYSTOLIC BLOOD PRESSURE: 126 MMHG | HEIGHT: 63 IN | TEMPERATURE: 97.5 F | WEIGHT: 262.2 LBS | BODY MASS INDEX: 46.46 KG/M2 | HEART RATE: 103 BPM | DIASTOLIC BLOOD PRESSURE: 84 MMHG

## 2023-11-29 DIAGNOSIS — M94.0 COSTOCHONDRITIS: ICD-10-CM

## 2023-11-29 DIAGNOSIS — B34.9 VIRAL SYNDROME: ICD-10-CM

## 2023-11-29 DIAGNOSIS — A09 DIARRHEA OF INFECTIOUS ORIGIN: Primary | ICD-10-CM

## 2023-11-29 PROCEDURE — 99214 OFFICE O/P EST MOD 30 MIN: CPT | Performed by: FAMILY MEDICINE

## 2023-11-29 RX ORDER — DIPHENOXYLATE HYDROCHLORIDE AND ATROPINE SULFATE 2.5; .025 MG/1; MG/1
1 TABLET ORAL 4 TIMES DAILY PRN
Qty: 10 TABLET | Refills: 0 | Status: SHIPPED | OUTPATIENT
Start: 2023-11-29

## 2023-11-29 NOTE — PROGRESS NOTES
Assessment/Plan: Supportive care recommended regarding viral syndrome as well as costochondritis. Patient use Lomotil for diarrhea as directed. Patient will stop Imodium. Patient will follow-up as needed       Diagnoses and all orders for this visit:    Diarrhea of infectious origin  -     diphenoxylate-atropine (LOMOTIL) 2.5-0.025 mg per tablet; Take 1 tablet by mouth 4 (four) times a day as needed for diarrhea    Costochondritis    Viral syndrome            Subjective:        Patient ID: Franc Morelos is a 29 y.o. female. Patient is here with diarrhea over the past weeks. Patient had other URI symptoms which resolved but diarrhea is persisting. Patient has roughly 3 bowel meds per day. No hematochezia. No melena. No vomiting. No fever. Patient does some pain in the epigastric region on Monday but this is better at this time. No radiation to the back. No worsening improvement with diarrhea. Patient did use Imodium. Patient also with some chest pain and shortness of breath with movement. Some palpitations noted. No dizziness or syncope. Patient with slight cough and sputum production. This is improving overall. Patient had multiple COVID test which were negative. Patient with some sinus congestion between her eyes    Diarrhea   Associated symptoms include arthralgias and coughing. Pertinent negatives include no fever. Shortness of Breath  Associated symptoms include chest pain and coughing. The following portions of the patient's history were reviewed and updated as appropriate: allergies, current medications, past family history, past medical history, past social history, past surgical history and problem list.      Review of Systems   Constitutional: Negative. Negative for fever. HENT: Negative. Eyes: Negative. Respiratory:  Positive for cough and shortness of breath. Cardiovascular:  Positive for chest pain. Gastrointestinal:  Positive for diarrhea. Endocrine: Negative. Genitourinary: Negative. Musculoskeletal:  Positive for arthralgias. Skin: Negative. Allergic/Immunologic: Negative. Neurological: Negative. Hematological: Negative. Psychiatric/Behavioral: Negative. Objective:      BMI Counseling: Body mass index is 46.45 kg/m². The BMI is above normal. Nutrition recommendations include encouraging healthy choices of fruits and vegetables. Exercise recommendations include moderate physical activity 150 minutes/week. Rationale for BMI follow-up plan is due to patient being overweight or obese. /84 (BP Location: Right arm, Patient Position: Sitting, Cuff Size: Large)   Pulse 103   Temp 97.5 °F (36.4 °C) (Temporal)   Ht 5' 3" (1.6 m)   Wt 119 kg (262 lb 3.2 oz)   LMP  (LMP Unknown)   SpO2 98%   BMI 46.45 kg/m²          Physical Exam  Vitals and nursing note reviewed. Constitutional:       General: She is not in acute distress. Appearance: Normal appearance. She is not ill-appearing, toxic-appearing or diaphoretic. HENT:      Head: Normocephalic and atraumatic. Right Ear: Tympanic membrane, ear canal and external ear normal. There is no impacted cerumen. Left Ear: Tympanic membrane, ear canal and external ear normal. There is no impacted cerumen. Nose: Nose normal. No congestion or rhinorrhea. Mouth/Throat:      Mouth: Mucous membranes are moist.      Pharynx: Oropharyngeal exudate present. No posterior oropharyngeal erythema. Eyes:      General: No scleral icterus. Right eye: No discharge. Left eye: No discharge. Neck:      Vascular: No carotid bruit. Cardiovascular:      Rate and Rhythm: Normal rate and regular rhythm. Pulses: Normal pulses. Heart sounds: Normal heart sounds. No murmur heard. No friction rub. No gallop. Pulmonary:      Effort: Pulmonary effort is normal. No respiratory distress. Breath sounds: Normal breath sounds.  No stridor. No wheezing, rhonchi or rales. Chest:      Chest wall: No tenderness. Abdominal:      General: Abdomen is flat. Bowel sounds are normal. There is no distension. Palpations: Abdomen is soft. Tenderness: There is no abdominal tenderness. There is no guarding or rebound. Musculoskeletal:         General: No swelling, tenderness, deformity or signs of injury. Normal range of motion. Cervical back: Normal range of motion and neck supple. No rigidity. No muscular tenderness. Right lower leg: No edema. Left lower leg: No edema. Lymphadenopathy:      Cervical: No cervical adenopathy. Skin:     General: Skin is warm and dry. Capillary Refill: Capillary refill takes less than 2 seconds. Coloration: Skin is not jaundiced. Findings: No bruising, erythema, lesion or rash. Neurological:      Mental Status: She is alert and oriented to person, place, and time. Mental status is at baseline. Cranial Nerves: No cranial nerve deficit. Sensory: No sensory deficit. Motor: No weakness. Coordination: Coordination normal.      Gait: Gait normal.   Psychiatric:         Mood and Affect: Mood normal.         Behavior: Behavior normal.         Thought Content:  Thought content normal.         Judgment: Judgment normal.

## 2023-12-08 ENCOUNTER — TELEMEDICINE (OUTPATIENT)
Dept: BEHAVIORAL/MENTAL HEALTH CLINIC | Facility: CLINIC | Age: 33
End: 2023-12-08
Payer: COMMERCIAL

## 2023-12-08 DIAGNOSIS — F33.1 DEPRESSION, MAJOR, RECURRENT, MODERATE (HCC): Primary | Chronic | ICD-10-CM

## 2023-12-08 DIAGNOSIS — F41.1 GENERALIZED ANXIETY DISORDER: Chronic | ICD-10-CM

## 2023-12-08 PROCEDURE — 90837 PSYTX W PT 60 MINUTES: CPT | Performed by: SOCIAL WORKER
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
## 2023-12-12 ENCOUNTER — OFFICE VISIT (OUTPATIENT)
Dept: FAMILY MEDICINE CLINIC | Facility: CLINIC | Age: 33
End: 2023-12-12
Payer: COMMERCIAL

## 2023-12-12 VITALS
DIASTOLIC BLOOD PRESSURE: 98 MMHG | WEIGHT: 262 LBS | BODY MASS INDEX: 46.42 KG/M2 | TEMPERATURE: 99 F | HEIGHT: 63 IN | SYSTOLIC BLOOD PRESSURE: 168 MMHG | OXYGEN SATURATION: 98 % | HEART RATE: 105 BPM

## 2023-12-12 DIAGNOSIS — J01.00 ACUTE NON-RECURRENT MAXILLARY SINUSITIS: Primary | ICD-10-CM

## 2023-12-12 PROCEDURE — 99213 OFFICE O/P EST LOW 20 MIN: CPT | Performed by: FAMILY MEDICINE

## 2023-12-12 RX ORDER — AMOXICILLIN AND CLAVULANATE POTASSIUM 875; 125 MG/1; MG/1
1 TABLET, FILM COATED ORAL EVERY 12 HOURS SCHEDULED
Qty: 14 TABLET | Refills: 0 | Status: SHIPPED | OUTPATIENT
Start: 2023-12-12 | End: 2023-12-19

## 2023-12-12 RX ORDER — ESOMEPRAZOLE MAGNESIUM 40 MG/1
40 CAPSULE, DELAYED RELEASE ORAL
COMMUNITY
Start: 2023-12-01

## 2023-12-12 RX ORDER — ONDANSETRON 4 MG/1
TABLET, FILM COATED ORAL
COMMUNITY
Start: 2023-10-25

## 2023-12-12 NOTE — PROGRESS NOTES
Assessment/Plan: Patient is ibuprofen as directed. Patient may use lozenges cold fluids other supportive care. Patient use Augmentin as directed       Diagnoses and all orders for this visit:    Acute non-recurrent maxillary sinusitis  -     amoxicillin-clavulanate (AUGMENTIN) 875-125 mg per tablet; Take 1 tablet by mouth every 12 (twelve) hours for 7 days            Subjective:        Patient ID: Maik Georges is a 29 y.o. female. Patient is here with some chest pain anteriorly with pain over ribs as well as some shortness of breath over the past week. Patient also with some fatigue and tiredness associated with this. Patient with cough with minimal to no sputum. Patient does have some phlegm in the back of her throat/larynx region. Patient with rhinorrhea and postnasal drip. Slight sore throat. Patient with headache but no earache. Patient with some diarrhea. Patient to use Tylenol. No COVID test done. The following portions of the patient's history were reviewed and updated as appropriate: allergies, current medications, past family history, past medical history, past social history, past surgical history and problem list.      Review of Systems   Constitutional:  Positive for fatigue. Negative for fever. HENT:  Positive for congestion, postnasal drip, rhinorrhea, sinus pressure, sinus pain and sore throat. Negative for ear pain. Eyes: Negative. Respiratory:  Positive for cough and shortness of breath. Cardiovascular:  Positive for chest pain. Gastrointestinal: Negative. Endocrine: Negative. Genitourinary: Negative. Musculoskeletal: Negative. Skin: Negative. Allergic/Immunologic: Negative. Neurological: Negative. Hematological: Negative. Psychiatric/Behavioral: Negative.              Objective:               /98 (BP Location: Right arm, Patient Position: Sitting, Cuff Size: Standard)   Pulse 105   Temp 99 °F (37.2 °C) (Temporal)   Ht 5' 3" (1.6 m)   Wt 119 kg (262 lb)   LMP  (LMP Unknown)   SpO2 98%   BMI 46.41 kg/m²          Physical Exam  Vitals and nursing note reviewed. Constitutional:       General: She is not in acute distress. Appearance: She is ill-appearing. She is not toxic-appearing or diaphoretic. HENT:      Head: Normocephalic and atraumatic. Right Ear: Tympanic membrane, ear canal and external ear normal. There is no impacted cerumen. Left Ear: Tympanic membrane, ear canal and external ear normal. There is no impacted cerumen. Nose: Rhinorrhea present. No congestion. Mouth/Throat:      Mouth: Mucous membranes are moist.      Pharynx: Oropharyngeal exudate and posterior oropharyngeal erythema present. Eyes:      General: No scleral icterus. Right eye: No discharge. Left eye: No discharge. Neck:      Vascular: No carotid bruit. Cardiovascular:      Rate and Rhythm: Normal rate and regular rhythm. Pulses: Normal pulses. Heart sounds: Normal heart sounds. No murmur heard. No friction rub. No gallop. Pulmonary:      Effort: Pulmonary effort is normal. No respiratory distress. Breath sounds: Normal breath sounds. No stridor. No wheezing, rhonchi or rales. Chest:      Chest wall: No tenderness. Musculoskeletal:         General: No swelling, tenderness, deformity or signs of injury. Normal range of motion. Cervical back: Normal range of motion and neck supple. No rigidity. No muscular tenderness. Right lower leg: No edema. Left lower leg: No edema. Lymphadenopathy:      Cervical: No cervical adenopathy. Skin:     General: Skin is warm and dry. Capillary Refill: Capillary refill takes less than 2 seconds. Coloration: Skin is not jaundiced. Findings: No bruising, erythema, lesion or rash. Neurological:      Mental Status: She is alert and oriented to person, place, and time. Mental status is at baseline.       Cranial Nerves: No cranial nerve deficit. Sensory: No sensory deficit. Motor: No weakness. Coordination: Coordination normal.      Gait: Gait normal.   Psychiatric:         Mood and Affect: Mood normal.         Behavior: Behavior normal.         Thought Content:  Thought content normal.         Judgment: Judgment normal.

## 2023-12-13 ENCOUNTER — VBI (OUTPATIENT)
Dept: ADMINISTRATIVE | Facility: OTHER | Age: 33
End: 2023-12-13

## 2023-12-13 ENCOUNTER — OFFICE VISIT (OUTPATIENT)
Dept: RHEUMATOLOGY | Facility: CLINIC | Age: 33
End: 2023-12-13
Payer: COMMERCIAL

## 2023-12-13 VITALS
WEIGHT: 263.2 LBS | BODY MASS INDEX: 46.64 KG/M2 | DIASTOLIC BLOOD PRESSURE: 82 MMHG | HEIGHT: 63 IN | SYSTOLIC BLOOD PRESSURE: 140 MMHG

## 2023-12-13 DIAGNOSIS — M45.0 ANKYLOSING SPONDYLITIS OF MULTIPLE SITES IN SPINE (HCC): ICD-10-CM

## 2023-12-13 PROCEDURE — 99245 OFF/OP CONSLTJ NEW/EST HI 55: CPT | Performed by: INTERNAL MEDICINE

## 2023-12-13 NOTE — PROGRESS NOTES
This is a Rheumatology Consult seen at the request of Dr. Wendi Ferguson      HPI: Elvia Lake is a 28 y/o female who presents for further evaluation Ankylosing Spondylitis, fibromyalgia. She has past medical Ankylosing spondylitis, fibromyalgia, anxiety, depression, B12 deficiency, GERD, IBS, migraine, HTN, IBS. Patient presents for 2nd opinion. She follows with Jayleen Diane PA-C at [facility]    She was dx with Ankylosing Spondylitis and Fibromyalgia in 2018 per [facility] rheumatology. Onset of symptoms per patient back in 2017 when she presented with low back pain, hip pain, fatigue, left knee, ribs, left achilles tendinitis, b/l TMJ left >> right, left shoulder pain    Also with diffuse myalgias and increased sensitivity to touch, poor sleep. Her prior w/u including BERNADINE, RF, CCP, HLA B27 negative    ESR and CRP checked recently negative. She has tried various DMARDs and biologics. Currently on IV Remicade started 5/23. She is on 5 mg/kg every 6 weeks. Next dose January 2024 at [facility]. Also on Indomethacin per neurology for migraine headaches. Also on Medical Marijuana    Plaquenil diarrhea, SSZ made migraine worse, Imuran did not help    Xeljanz (nausea), Rinvoq (abdominal pain)     Humira (n/v/d), cimzia (swellng face and ankles), Taltz (nausea) , Enbrel (nausea)     Fibromyalgia: Failed/intolerant Lyrica (LE swelling), Methocarbamol and Tizanidine ( mood changes)     Chronic low back pain: has seen pain management here at [facility] in the past and had injections which helped. H/o fatigue and has had sleep study-no apnea.      Her mother and grandmother have PsA, psoriasis, paternal side has AS, ulcerative colitiis.       --------------------------------------------------------------------------------------------------------        ROS:        All other ROS was reviewed and negative except as above --------------------------------------------------------------------------------------------------------    Past Medical History    Past Medical History:   Diagnosis Date    Abnormal Pap smear of cervix     Allergic     Anxiety     Arthritis     Depression     Diabetes mellitus (HCC)     Fibromyalgia, primary     Hypertension     IBS (irritable bowel syndrome)     Migraine     Obesity     Vitamin B12 deficiency            Past Surgical History    Past Surgical History:   Procedure Laterality Date    COLONOSCOPY N/A 5/8/2018    Procedure: COLONOSCOPY;  Surgeon: Sonya Arana MD;  Location: [facility];   Service: Gastroenterology    WY EXC B9 LESION MRGN XCP SK TG S/N/H/F/G > 4.0CM N/A 7/1/2021    Procedure: EXCISION OF PILAR SCALP CYST X 2 AND RIGHT INNER GROIN NEVVUS;  Surgeon: Blue Whiteside MD;  Location: [address] [place] [address] OR;  Service: Plastics    WY REPAIR COMPLEX SCALP/ARM/LEG 2.6-7.5 CM N/A 7/1/2021    Procedure: CLOSURE WOUND SCALP X 2 AND RIGHT INNER GROIN;  Surgeon: Blue Whiteside MD;  Location: [address] [place] [address] OR;  Service: Plastics    TONSILLECTOMY      WISDOM TOOTH EXTRACTION             Family History    Family History   Problem Relation Age of Onset    Rheum arthritis Mother     Psoriasis Mother     Other Mother     Hypertension Mother     Diabetes unspecified Mother     Sjogren's syndrome Mother     Alcohol abuse Mother     Drug abuse Mother     Anxiety disorder Father     Alcohol abuse Father     Lung cancer Maternal Grandfather     Cancer Other         bone    Diabetes Other     Other Other         High blood pressure    Depression Maternal Grandmother             Social History    Social History     Tobacco Use    Smoking status: Never     Passive exposure: Past    Smokeless tobacco: Never   Vaping Use    Vaping status: Some Days    Start date: 7/1/2019    Substances: THC, CBD   Substance Use Topics    Alcohol use: Not Currently     Comment: rarely    Drug use: Yes     Frequency: 2.0 times per week Types: Marijuana     Comment: medical marijuana (vape)     Currently not working  Last worked 2018    Allergies    Allergies   Allergen Reactions    Cimzia [Certolizumab Pegol] Swelling    Desvenlafaxine      Other reaction(s): state of confusion    Ixekizumab Vomiting    Seasonal Ic [Octacosanol] Nasal Congestion    Valproic Acid Other (See Comments)     Other reaction(s): dilated pupils, "schizi"    Voltaren [Diclofenac] Swelling    Tizanidine Anxiety         Medications    Current Outpatient Medications   Medication Instructions    Aimovig 140 MG/ML SOAJ Inject 140mg (1 pen) under the skin every 30 days. ALPRAZolam (XANAX) 0.5 mg, Oral, 2 times daily PRN    amoxicillin-clavulanate (AUGMENTIN) 875-125 mg per tablet 1 tablet, Oral, Every 12 hours scheduled    ascorbic acid (VITAMIN C) 500 mg, Oral, Daily    buPROPion (WELLBUTRIN XL) 300 mg, Oral, Daily    Cholecalciferol (Vitamin D3) 50 MCG (2000 UT) TABS No dose, route, or frequency recorded. cyanocobalamin 1,000 mcg/mL 1 IM injection every month    diphenoxylate-atropine (LOMOTIL) 2.5-0.025 mg per tablet 1 tablet, Oral, 4 times daily PRN    escitalopram (LEXAPRO) 20 mg, Oral, Daily    esomeprazole (NEXIUM) 40 mg, Oral    etonogestrel-ethinyl estradiol (NuvaRing) 0.12-0.015 MG/24HR vaginal ring Insert ring for 21 days then remove for one week. FeroSul 325 mg, Oral, Daily    indomethacin (INDOCIN) 25 mg capsule 1-2 tabs BID prn severe headache with food. Hold toradol. inFLIXimab-axxq (Avsola) 100 mg SOLR 5 mg/kg, Intravenous    magnesium Oxide (MAG-OX) 400 mg, Oral, Daily    OLANZapine (ZYPREXA) 2.5 mg, Oral, Daily, Do not take with reglan.     onabotulinumtoxin A (BOTOX) 100 units Injection    ondansetron (ZOFRAN) 4 mg tablet     ondansetron (ZOFRAN-ODT) 4 mg, Oral, Every 6 hours PRN    pantoprazole (PROTONIX) 40 mg, Oral, Daily    pramipexole (MIRAPEX) 0.25 mg, Oral, Daily at bedtime    prazosin (MINIPRESS) 2 mg, Oral, Daily at bedtime    predniSONE 5 mg tablet No dose, route, or frequency recorded. Riboflavin 400 MG CAPS One tab daily    Trudhesa 0.725 MG/ACT AERS 1 spray in each nostril for total dose of 1.45mg, may repeat 1 dose after 1 hour. Max 2 doses per 24 hours and 3 doses per week. Vitamin D3 2,000 Units, Oral, Daily          Physical Exam    /82   Ht 5' 3" (1.6 m)   Wt 119 kg (263 lb 3.2 oz)   LMP  (LMP Unknown)   BMI 46.62 kg/m²     GEN: AAO, No apparent distress. Patient is well developed. HEENT:  Pupils are equal, round and reactive. Sclera are clear. Fundoscopic exam is normal.  External ears are without lesions. Oral pharynx is clear of ulcers or other lesions. MMM. NECK:  Supple. There is no adenopathy appreciable in anterior or posterior cervical chains or supraclavicularly. JVP is normal.    HEART: Regular rate and rhythm. There is no appreciable murmur, gallop or rub. LUNGS: Clear to auscultation. ABD:  Soft, without tenderness, rebound or guarding. No appreciable organomegally. NEURO: Speech and cognition are normal.  Strength is 5/5 throughout. Tone is normal.  DTRs are 2/4 at the knees, ankles and elbows. Gait is normal.  SKIN: There are no rashes or lesions    MUSCULOSKELETAL:   + tender points fibromyalgia      ________________________________________________________________________          Results Review    C-REACTIVE PROTEIN  Order: [id number]   suggestion  Result Information displayed in this report will not trend and may not trigger automated decision support.      Component  Ref Range & Units 1 mo ago   C-Reactive Protein  <10.0 mg/L 8.7     Specimen Collected: 10/27/23  7:15 AM    Performed by: [facility] CORE LAB ([email or web address]) Last Resulted: 10/27/23  3:00 PM   Received From: [facility]  Result Received: 11/09/23  9:53 AM    View Encounter        Received Information  SEDIMENTATION RATE  Order: [id number]   suggestion  Information displayed in this report may not trend or trigger automated decision support. Component  Ref Range & Units 1 mo ago   Sed Rate  0 - 20 mm/hr 11     Specimen Collected: 10/27/23  7:15 AM    Performed by: [facility] CORE LAB ([email or web address]) Last Resulted: 10/27/23  9:56 AM   Received From: [address] [place] [address]  Result Received: 11/09/23  9:53 AM    View Encounter        Received Information   Contains abnormal data COMPREHENSIVE METABOLIC PANEL  Order: [id number]   suggestion  Result Information displayed in this report will not trend and may not trigger automated decision support. Component  Ref Range & Units 1 mo ago   Glucose  65 - 99 mg/dL 122 High    BUN  7 - 25 mg/dL 10   Creatinine  0.40 - 1.10 mg/dL 0.78   Sodium  135 - 145 mmol/L 139   Potassium  3.5 - 5.2 mmol/L 3.9   Chloride  100 - 109 mmol/L 107   Carbon Dioxide  21 - 31 mmol/L 21   Calcium  8.5 - 10.1 mg/dL 8.8   Alkaline Phosphatase  35 - 120 U/L 45   Albumin  3.5 - 5.7 g/dL 3.6   Bilirubin, Total  0.2 - 1.0 mg/dL 0.4   Comment: Eltrombopag and its metabolites may interfere with this assay causing erroneously high patient results. Protein, Total  6.3 - 8.3 g/dL 6.3   AST  <41 U/L 18   ALT  <56 U/L 18   Anion Gap  3 - 11 11   eGFRcr  >59 103   eGFRcr Comment Interpretive information: calculated GFR   Comment:                                           Units: mL/min per 1.73 square meters  Reported eGFR is based on the CKD-EPI 2021 creatinine equation that does not use a race coefficient and conforms to the NKF-ASN Task Force Recommendations. Note: Calculated GFR may not be an accurate indicator of renal function if the patient's renal function is not in a steady state. GFR Categories in Chronic Kidney Disease (CKD):  GFR        GFR (mL/min/1.73  Category:  square meters):   Interpretation:  G1         90 or greater    Normal or high*  G2         60 - 89          Mild decrease*  G3a        45 - 59          Mild to moderate decrease  G3b        30 - 44          Moderate to severe decrease  G4         15 - 29          Severe decrease  G5         14 or less       Kidney failure                                            *In the absence of evidence of kidney damage, neither GFR category G1 or G2 fulfill the criteria for CKD. Kidney Int Suppl 2013, 3: 1-1 50. Specimen Collected: 10/27/23  7:15 AM    Performed by: [facility] CORE LAB ([email or web address]) Last Resulted: 10/27/23  3:00 PM   Received From: [address] [place] [address]  Result Received: 11/09/23  9:53 AM    View Encounter        Received Information   Contains abnormal data CBC AND DIFFERENTIAL  Order: [id number]   suggestion  Result Information displayed in this report will not trend and may not trigger automated decision support. Component  Ref Range & Units 1 mo ago   Hemoglobin  11.5 - 14.5 g/dL 11.9   Hematocrit  35.0 - 43.0 % 34.0 Low    WBC  4.0 - 10.0 thou/cmm 7.5   RBC  3.70 - 4.70 mill/cmm 3.98   Platelet Count  486 - 350 thou/cmm 383 High    MPV  7.5 - 11.3 fL 7.8   MCV  80 - 100 fL 85   MCH  26.0 - 34.0 pg 29.8   MCHC  32.0 - 37.0 g/dL 34.9   RDW  12.0 - 16.0 % 14.5   Differential Type AUTO   Absolute Neutrophils  1.8 - 7.8 thou/cmm 1.9   Absolute Lymphocytes  1.0 - 3.0 thou/cmm 4.6 High    Absolute Monocytes  0.3 - 1.0 thou/cmm 0.7   Absolute Eosinophils  0.0 - 0.5 thou/cmm 0.2   Absolute Basophils  0.0 - 0.1 thou/cmm 0.0   Neutrophils  % 26   Lymphocytes  % 61   Monocytes  % 9   Eosinophils  % 3   Basophils  % 1         Impressions       Ankylosing Spondylitis  Fibromyalgia  IBS  Migraine    Plans:    H/o inflammatory spondyloarthropathy and fibromyalgia. Follows with [facility] rheumatology. Her inflammatory spondyloarthropathy appears controlled with IV Remicade. Labs checked recently with normal inflammatory markers    Suspect active issues related to fibromyalgia. Can consider cymbalta. savella, lyrica, gabapentin etc if no contraindications per her rheumatologist and/or PCP    She will continue to f/u with [facility] rheumatology    Thank you for involving me in this patient's care. Michelle Vigil MD  Rheumatology  [facility]    I have spent a total time of 62 minutes on 12/13/23 in caring for this patient including Diagnostic results, Risks and benefits of tx options, Instructions for management, Importance of tx compliance, Impressions, Counseling / Coordination of care, Documenting in the medical record, Reviewing / ordering tests, medicine, procedures  , and Obtaining or reviewing history  .

## 2023-12-22 ENCOUNTER — TELEMEDICINE (OUTPATIENT)
Dept: BEHAVIORAL/MENTAL HEALTH CLINIC | Facility: CLINIC | Age: 33
End: 2023-12-22
Payer: COMMERCIAL

## 2023-12-22 DIAGNOSIS — F41.1 GENERALIZED ANXIETY DISORDER: Chronic | ICD-10-CM

## 2023-12-22 DIAGNOSIS — F33.1 DEPRESSION, MAJOR, RECURRENT, MODERATE (HCC): Primary | Chronic | ICD-10-CM

## 2023-12-22 PROCEDURE — 90834 PSYTX W PT 45 MINUTES: CPT | Performed by: SOCIAL WORKER

## 2023-12-22 NOTE — PSYCH
Virtual Regular Visit    Verification of patient location:    Patient is located at Home in the following state in which I hold an active license [place]    Assessment/Plan:    Problem List Items Addressed This Visit          Other    Generalized anxiety disorder (Chronic)    Depression, major, recurrent, moderate (HCC) - Primary (Chronic)     Goals addressed in session: Goal 1      Reason for visit is   Chief Complaint   Patient presents with    Virtual Regular Visit     Encounter provider APARNA Fox    Provider [facility] at [facility]  [facility] [place]  [address] [place] [address] [phone]      Recent Visits  No visits were found meeting these conditions.  Showing recent visits within past 7 days and meeting all other requirements  Today's Visits  Date Type Provider Dept   12/22/23 Telemedicine APARNA Fox  [facility] [place]   Showing today's visits and meeting all other requirements  Future Appointments  No visits were found meeting these conditions.  Showing future appointments within next 150 days and meeting all other requirements       The patient was identified by name and date of birth. Cruz Schneider was informed that this is a telemedicine visit and that the visit is being conducted throughthe Epic Embedded platform. She agrees to proceed..  My office door was closed. No one else was in the room.  She acknowledged consent and understanding of privacy and security of the video platform. The patient has agreed to participate and understands they can discontinue the visit at any time.    Patient is aware this is a billable service.     Subjective  Cruz Schneider is a 33 y.o. female.    HPI     Past Medical History:   Diagnosis Date    Abnormal Pap smear of cervix     Allergic     Anxiety     Arthritis     Depression     Diabetes mellitus (HCC)     Fibromyalgia, primary     Hypertension      IBS (irritable bowel syndrome)     Migraine     Obesity     Vitamin B12 deficiency        Past Surgical History:   Procedure Laterality Date    COLONOSCOPY N/A 5/8/2018    Procedure: COLONOSCOPY;  Surgeon: Stephanie Alston MD;  Location: [facility];  Service: Gastroenterology    [place] EXC B9 LESION MRGN XCP SK TG S/N/H/F/G > 4.0CM N/A 7/1/2021    Procedure: EXCISION OF PILAR SCALP CYST X 2 AND RIGHT INNER GROIN NEVVUS;  Surgeon: Denis Barron MD;  Location:  MAIN OR;  Service: Plastics    TN REPAIR COMPLEX SCALP/ARM/LEG 2.6-7.5 CM N/A 7/1/2021    Procedure: CLOSURE WOUND SCALP X 2 AND RIGHT INNER GROIN;  Surgeon: Denis Barron MD;  Location:  MAIN OR;  Service: Plastics    TONSILLECTOMY      WISDOM TOOTH EXTRACTION         Current Outpatient Medications   Medication Sig Dispense Refill    Aimovig 140 MG/ML SOAJ Inject 140mg (1 pen) under the skin every 30 days. 1 mL 11    ALPRAZolam (XANAX) 0.5 mg tablet Take 1 tablet (0.5 mg total) by mouth 2 (two) times a day as needed for anxiety 60 tablet 2    ascorbic acid (VITAMIN C) 500 MG tablet Take 500 mg by mouth daily      buPROPion (WELLBUTRIN XL) 300 mg 24 hr tablet Take 1 tablet (300 mg total) by mouth daily 90 tablet 0    Cholecalciferol (Vitamin D3) 50 MCG (2000 UT) capsule Take 1 capsule (2,000 Units total) by mouth daily 90 capsule 1    Cholecalciferol (Vitamin D3) 50 MCG (2000 UT) TABS       cyanocobalamin 1,000 mcg/mL 1 IM injection every month 3 mL 3    diphenoxylate-atropine (LOMOTIL) 2.5-0.025 mg per tablet Take 1 tablet by mouth 4 (four) times a day as needed for diarrhea 10 tablet 0    escitalopram (LEXAPRO) 20 mg tablet Take 1 tablet (20 mg total) by mouth daily 90 tablet 0    esomeprazole (NexIUM) 40 MG capsule Take 40 mg by mouth      etonogestrel-ethinyl estradiol (NuvaRing) 0.12-0.015 MG/24HR vaginal ring Insert ring for 21 days then remove for one week. 3 each 4    FeroSul 325 (65 Fe) MG tablet Take 1 tablet (325 mg total) by mouth in the  morning 90 tablet 1    indomethacin (INDOCIN) 25 mg capsule 1-2 tabs BID prn severe headache with food. Hold toradol. 30 capsule 0    inFLIXimab-axxq (Avsola) 100 mg SOLR Inject 5 mg/kg into a catheter in a vein      magnesium Oxide (MAG-OX) 400 mg TABS Take 1 tablet (400 mg total) by mouth daily 30 tablet 5    mexiletine (MEXITIL) 150 mg capsule Once daily x 1 week, then  capsule 0    OLANZapine (ZyPREXA) 2.5 mg tablet Take 1 tablet (2.5 mg total) by mouth daily Do not take with reglan. 90 tablet 0    onabotulinumtoxin A (BOTOX) 100 units Inject as directed      ondansetron (ZOFRAN) 4 mg tablet       ondansetron (ZOFRAN-ODT) 4 mg disintegrating tablet Take 1 tablet (4 mg total) by mouth every 6 (six) hours as needed for nausea or vomiting 90 tablet 0    pantoprazole (PROTONIX) 40 mg tablet Take 1 tablet (40 mg total) by mouth daily 30 tablet 3    pramipexole (MIRAPEX) 0.25 mg tablet Take 1 tablet (0.25 mg total) by mouth daily at bedtime 34 tablet 3    prazosin (MINIPRESS) 2 mg capsule Take 1 capsule (2 mg total) by mouth daily at bedtime 90 capsule 0    predniSONE 5 mg tablet       Riboflavin 400 MG CAPS One tab daily 30 capsule 5    Trudhesa 0.725 MG/ACT AERS 1 spray in each nostril for total dose of 1.45mg, may repeat 1 dose after 1 hour. Max 2 doses per 24 hours and 3 doses per week. 12 mL 6     Current Facility-Administered Medications   Medication Dose Route Frequency Provider Last Rate Last Admin    cyanocobalamin injection 1,000 mcg  1,000 mcg Intramuscular Q30 Days Oni Beckham DO   1,000 mcg at 08/03/20 0859    cyanocobalamin injection 1,000 mcg  1,000 mcg Intramuscular Q14 Days Oni Beckham DO   1,000 mcg at 05/18/20 1146    cyanocobalamin injection 1,000 mcg  1,000 mcg Intramuscular Q30 Days Oni Beckham DO   1,000 mcg at 09/01/20 1129        Allergies   Allergen Reactions    Cimzia [Certolizumab Pegol] Swelling    Desvenlafaxine      Other reaction(s): state of confusion    Ixekizumab  "Vomiting    Seasonal Ic [Octacosanol] Nasal Congestion    Valproic Acid Other (See Comments)     Other reaction(s): dilated pupils, \"schizi\"    Voltaren [Diclofenac] Swelling    Tizanidine Anxiety       Review of Systems    Video Exam    There were no vitals filed for this visit.    Physical Exam     Behavioral Health Psychotherapy Progress Note    Psychotherapy Provided: Individual Psychotherapy     1. Depression, major, recurrent, moderate (HCC)        2. Generalized anxiety disorder            Goals addressed in session: Goal 1     DATA: Met with Stephanie for her scheduled individual session. Stephanie discussed her increase in feelings of depression, which she acknowledges are related to the multiple stressors in her life. She discussed her second opinion appointment. She states that the doctor who provided the second opinion also told her that the current treatment is \"my last option.\" She states that she feels defeated, because she continues to experience the symptoms of her illness and the side effects of the medications she is taking. She states that she feels defeated, due to there being no additional treatment options for her. We discussed the process of grieving the lack of treatment options and the health issues she is attempting to manage. Stephanie states that she and her grandmother have not been connecting recently. She states that her grandmother told her that she has been increasingly critical of her--e.g., her grandmother told her that her attitude has changed. Stephanie feels that her grandmother's behaviors have changed and that she is more demanding and more critical with her. We discussed the possibility of her grandmother's behavior change being related to her medical status. I provided some education regarding irritability being a common symptom of depression in the elderly and/or early dementia/organic brain changes. We discussed Stephanie's viewpoint of her grandmother's behaviors and the possibility of " "Stephanie changing her own attitude about her grandmother's behaviors-- so she can better manage her responses. We discussed setting appropriate limits and boundaries while also being able to manage her emotions and her responses. Stephanie discussed her relationship with her best friend and her best friend's daughters. She expressed gratitude for the relationships she has with them.     During this session, this clinician used the following therapeutic modalities: Client-centered Therapy, Dialectical Behavior Therapy, Mindfulness-based Strategies, Motivational Interviewing, Solution-Focused Therapy, and Supportive Psychotherapy    Substance Abuse was not addressed during this session. If the client is diagnosed with a co-occurring substance use disorder, please indicate any changes in the frequency or amount of use: n/a. Stage of change for addressing substance use diagnoses: No substance use/Not applicable    ASSESSMENT:  Stephanie Schneider presents with a Dysthymic mood.     her affect is Normal range and intensity and Tearful, which is congruent, with her mood and the content of the session. The client has not made progress on their goals since our last session.     Stephanie Schneider presents with a minimal risk of suicide, minimal risk of self-harm, and minimal risk of harm to others.    For any risk assessment that surpasses a \"low\" rating, a safety plan must be developed.    A safety plan was indicated: no  If yes, describe in detail n/a    PLAN: Between sessions, Stephanie Schneider will continue to monitor her moods. She will continue to set limits with her grandmother, to help maintain her own mental and physical health. At the next session, the therapist will use Client-centered Therapy, Dialectical Behavior Therapy, Mindfulness-based Strategies, Motivational Interviewing, Solution-Focused Therapy, and Supportive Psychotherapy to address her mood regulation and relationship concerns.    Behavioral Health Treatment Plan and " Discharge Planning: Stephanie Schneider is aware of and agrees to continue to work on their treatment plan. They have identified and are working toward their discharge goals. yes    Visit start and stop times:    12/22/23  Start Time: 0813  Stop Time: 0859  Total Visit Time: 46 minutes

## 2023-12-24 ENCOUNTER — APPOINTMENT (EMERGENCY)
Dept: CT IMAGING | Facility: HOSPITAL | Age: 33
End: 2023-12-24
Payer: COMMERCIAL

## 2023-12-24 ENCOUNTER — HOSPITAL ENCOUNTER (EMERGENCY)
Facility: HOSPITAL | Age: 33
Discharge: HOME | End: 2023-12-24
Attending: FAMILY MEDICINE
Payer: COMMERCIAL

## 2023-12-24 VITALS
DIASTOLIC BLOOD PRESSURE: 81 MMHG | HEIGHT: 63 IN | TEMPERATURE: 97.4 F | BODY MASS INDEX: 46.07 KG/M2 | SYSTOLIC BLOOD PRESSURE: 133 MMHG | RESPIRATION RATE: 20 BRPM | OXYGEN SATURATION: 97 % | HEART RATE: 91 BPM | WEIGHT: 260 LBS

## 2023-12-24 DIAGNOSIS — R10.9 ABDOMINAL PAIN: Primary | ICD-10-CM

## 2023-12-24 DIAGNOSIS — R19.7 DIARRHEA: ICD-10-CM

## 2023-12-24 DIAGNOSIS — R11.0 NAUSEA: ICD-10-CM

## 2023-12-24 LAB
ANION GAP SERPL CALCULATED.3IONS-SCNC: 10 MMOL/L
BACTERIA UR QL AUTO: ABNORMAL /HPF
BASOPHILS # BLD AUTO: 0.02 THOUSANDS/ÂΜL (ref 0–0.1)
BASOPHILS NFR BLD AUTO: 0 % (ref 0–1)
BILIRUB UR QL STRIP: ABNORMAL
BUN SERPL-MCNC: 7 MG/DL (ref 5–25)
CALCIUM SERPL-MCNC: 8.9 MG/DL (ref 8.4–10.2)
CHLORIDE SERPL-SCNC: 104 MMOL/L (ref 96–108)
CLARITY UR: ABNORMAL
CO2 SERPL-SCNC: 22 MMOL/L (ref 21–32)
COLOR UR: YELLOW
CREAT SERPL-MCNC: 0.65 MG/DL (ref 0.6–1.3)
EOSINOPHIL # BLD AUTO: 0.2 THOUSAND/ÂΜL (ref 0–0.61)
EOSINOPHIL NFR BLD AUTO: 3 % (ref 0–6)
ERYTHROCYTE [DISTWIDTH] IN BLOOD BY AUTOMATED COUNT: 13.8 % (ref 11.6–15.1)
EXT PREGNANCY TEST URINE: NEGATIVE
EXT. CONTROL: NORMAL
GFR SERPL CREATININE-BSD FRML MDRD: 117 ML/MIN/1.73SQ M
GLUCOSE SERPL-MCNC: 103 MG/DL (ref 65–140)
GLUCOSE UR STRIP-MCNC: NEGATIVE MG/DL
HCT VFR BLD AUTO: 37.5 % (ref 34.8–46.1)
HGB BLD-MCNC: 12.5 G/DL (ref 11.5–15.4)
HGB UR QL STRIP.AUTO: NEGATIVE
IMM GRANULOCYTES # BLD AUTO: 0.01 THOUSAND/UL (ref 0–0.2)
IMM GRANULOCYTES NFR BLD AUTO: 0 % (ref 0–2)
KETONES UR STRIP-MCNC: ABNORMAL MG/DL
LEUKOCYTE ESTERASE UR QL STRIP: ABNORMAL
LIPASE SERPL-CCNC: 18 U/L (ref 11–82)
LYMPHOCYTES # BLD AUTO: 3.16 THOUSANDS/ÂΜL (ref 0.6–4.47)
LYMPHOCYTES NFR BLD AUTO: 46 % (ref 14–44)
MCH RBC QN AUTO: 29.9 PG (ref 26.8–34.3)
MCHC RBC AUTO-ENTMCNC: 33.3 G/DL (ref 31.4–37.4)
MCV RBC AUTO: 90 FL (ref 82–98)
MONOCYTES # BLD AUTO: 0.45 THOUSAND/ÂΜL (ref 0.17–1.22)
MONOCYTES NFR BLD AUTO: 7 % (ref 4–12)
MUCOUS THREADS UR QL AUTO: ABNORMAL
NEUTROPHILS # BLD AUTO: 3.05 THOUSANDS/ÂΜL (ref 1.85–7.62)
NEUTS SEG NFR BLD AUTO: 44 % (ref 43–75)
NITRITE UR QL STRIP: NEGATIVE
NON-SQ EPI CELLS URNS QL MICRO: ABNORMAL /HPF
NRBC BLD AUTO-RTO: 0 /100 WBCS
PH UR STRIP.AUTO: 6 [PH]
PLATELET # BLD AUTO: 357 THOUSANDS/UL (ref 149–390)
PMV BLD AUTO: 9.1 FL (ref 8.9–12.7)
POTASSIUM SERPL-SCNC: 3.8 MMOL/L (ref 3.5–5.3)
PROT UR STRIP-MCNC: ABNORMAL MG/DL
RBC # BLD AUTO: 4.18 MILLION/UL (ref 3.81–5.12)
RBC #/AREA URNS AUTO: ABNORMAL /HPF
SODIUM SERPL-SCNC: 136 MMOL/L (ref 135–147)
SP GR UR STRIP.AUTO: >=1.03
UROBILINOGEN UR QL STRIP.AUTO: 0.2 E.U./DL
WBC # BLD AUTO: 6.89 THOUSAND/UL (ref 4.31–10.16)
WBC #/AREA URNS AUTO: ABNORMAL /HPF

## 2023-12-24 PROCEDURE — 96374 THER/PROPH/DIAG INJ IV PUSH: CPT

## 2023-12-24 PROCEDURE — C9113 INJ PANTOPRAZOLE SODIUM, VIA: HCPCS | Performed by: FAMILY MEDICINE

## 2023-12-24 PROCEDURE — 81025 URINE PREGNANCY TEST: CPT | Performed by: FAMILY MEDICINE

## 2023-12-24 PROCEDURE — 80048 BASIC METABOLIC PNL TOTAL CA: CPT | Performed by: FAMILY MEDICINE

## 2023-12-24 PROCEDURE — 99284 EMERGENCY DEPT VISIT MOD MDM: CPT

## 2023-12-24 PROCEDURE — 83690 ASSAY OF LIPASE: CPT | Performed by: FAMILY MEDICINE

## 2023-12-24 PROCEDURE — 96361 HYDRATE IV INFUSION ADD-ON: CPT

## 2023-12-24 PROCEDURE — 99285 EMERGENCY DEPT VISIT HI MDM: CPT | Performed by: FAMILY MEDICINE

## 2023-12-24 PROCEDURE — G1004 CDSM NDSC: HCPCS

## 2023-12-24 PROCEDURE — 81001 URINALYSIS AUTO W/SCOPE: CPT | Performed by: FAMILY MEDICINE

## 2023-12-24 PROCEDURE — 85025 COMPLETE CBC W/AUTO DIFF WBC: CPT | Performed by: FAMILY MEDICINE

## 2023-12-24 PROCEDURE — 96375 TX/PRO/DX INJ NEW DRUG ADDON: CPT

## 2023-12-24 PROCEDURE — 87086 URINE CULTURE/COLONY COUNT: CPT | Performed by: FAMILY MEDICINE

## 2023-12-24 PROCEDURE — 36415 COLL VENOUS BLD VENIPUNCTURE: CPT | Performed by: FAMILY MEDICINE

## 2023-12-24 PROCEDURE — 74177 CT ABD & PELVIS W/CONTRAST: CPT

## 2023-12-24 RX ORDER — LOPERAMIDE HYDROCHLORIDE 2 MG/1
2 CAPSULE ORAL 4 TIMES DAILY PRN
Qty: 12 CAPSULE | Refills: 0 | Status: SHIPPED | OUTPATIENT
Start: 2023-12-24 | End: 2023-12-24

## 2023-12-24 RX ORDER — DICYCLOMINE HCL 20 MG
20 TABLET ORAL 2 TIMES DAILY
Qty: 20 TABLET | Refills: 0 | Status: SHIPPED | OUTPATIENT
Start: 2023-12-24 | End: 2023-12-24

## 2023-12-24 RX ORDER — ONDANSETRON 4 MG/1
4 TABLET, FILM COATED ORAL EVERY 6 HOURS
Qty: 12 TABLET | Refills: 0 | Status: SHIPPED | OUTPATIENT
Start: 2023-12-24 | End: 2023-12-24

## 2023-12-24 RX ORDER — PANTOPRAZOLE SODIUM 40 MG/10ML
40 INJECTION, POWDER, LYOPHILIZED, FOR SOLUTION INTRAVENOUS ONCE
Status: COMPLETED | OUTPATIENT
Start: 2023-12-24 | End: 2023-12-24

## 2023-12-24 RX ORDER — ONDANSETRON 2 MG/ML
4 INJECTION INTRAMUSCULAR; INTRAVENOUS ONCE
Status: COMPLETED | OUTPATIENT
Start: 2023-12-24 | End: 2023-12-24

## 2023-12-24 RX ORDER — LOPERAMIDE HYDROCHLORIDE 2 MG/1
2 CAPSULE ORAL 4 TIMES DAILY PRN
Qty: 12 CAPSULE | Refills: 0 | Status: SHIPPED | OUTPATIENT
Start: 2023-12-24

## 2023-12-24 RX ORDER — ONDANSETRON 4 MG/1
4 TABLET, FILM COATED ORAL EVERY 6 HOURS
Qty: 12 TABLET | Refills: 0 | Status: SHIPPED | OUTPATIENT
Start: 2023-12-24 | End: 2024-01-05 | Stop reason: SDUPTHER

## 2023-12-24 RX ORDER — DICYCLOMINE HCL 20 MG
20 TABLET ORAL 2 TIMES DAILY
Qty: 20 TABLET | Refills: 0 | Status: SHIPPED | OUTPATIENT
Start: 2023-12-24 | End: 2024-01-05 | Stop reason: SDUPTHER

## 2023-12-24 RX ADMIN — PANTOPRAZOLE SODIUM 40 MG: 40 INJECTION, POWDER, FOR SOLUTION INTRAVENOUS at 08:45

## 2023-12-24 RX ADMIN — SODIUM CHLORIDE 1000 ML: 0.9 INJECTION, SOLUTION INTRAVENOUS at 08:45

## 2023-12-24 RX ADMIN — IOHEXOL 100 ML: 350 INJECTION, SOLUTION INTRAVENOUS at 09:31

## 2023-12-24 RX ADMIN — ONDANSETRON 4 MG: 2 INJECTION INTRAMUSCULAR; INTRAVENOUS at 08:44

## 2023-12-24 NOTE — ED NOTES
Patient unable to provide urine for testing at this time.  Patient states she is not currently sexually active, and there is no possibility of pregnancy.     Brunilda Phipps RN  12/24/23 4543

## 2023-12-24 NOTE — ED PROVIDER NOTES
History  Chief Complaint   Patient presents with    Diarrhea     Since mat     [facility]  This is a 33-year-old female with history of chronic diarrhea presented to ED with the complaint of diarrhea abdominal pain and some nausea.  Patient states that she has been having diarrhea since Mat went to see her PCP who started her medication which caused constipation.  Patient states she stopped taking the medication and that having the 2-3 episodes of diarrhea daily.  Patient states since Friday she has been feeling nauseous and having some epigastric pain which prompted this ED visit.  She denies any fever chills at this time sitting comfortably in bed answer questions.  Prior to Admission Medications   Prescriptions Last Dose Informant Patient Reported? Taking?   ALPRAZolam (XANAX) 0.5 mg tablet   No No   Sig: Take 1 tablet (0.5 mg total) by mouth 2 (two) times a day as needed for anxiety   Aimovig 140 MG/ML SOAJ   No No   Sig: Inject 140mg (1 pen) under the skin every 30 days.   Cholecalciferol (Vitamin D3) 50 MCG (2000 UT) TABS   Yes No   Cholecalciferol (Vitamin D3) 50 MCG (2000 UT) capsule   No No   Sig: Take 1 capsule (2,000 Units total) by mouth daily   FeroSul 325 (65 Fe) MG tablet   No No   Sig: Take 1 tablet (325 mg total) by mouth in the morning   OLANZapine (ZyPREXA) 2.5 mg tablet   No No   Sig: Take 1 tablet (2.5 mg total) by mouth daily Do not take with reglan.   Riboflavin 400 MG CAPS   No No   Sig: One tab daily   Trudhesa 0.725 MG/ACT AERS   No No   Si spray in each nostril for total dose of 1.45mg, may repeat 1 dose after 1 hour. Max 2 doses per 24 hours and 3 doses per week.   ascorbic acid (VITAMIN C) 500 MG tablet   Yes No   Sig: Take 500 mg by mouth daily   buPROPion (WELLBUTRIN XL) 300 mg 24 hr tablet   No No   Sig: Take 1 tablet (300 mg total) by mouth daily   cyanocobalamin 1,000 mcg/mL   No No   Si IM injection every month   diphenoxylate-atropine (LOMOTIL) 2.5-0.025 mg per  tablet   No No   Sig: Take 1 tablet by mouth 4 (four) times a day as needed for diarrhea   escitalopram (LEXAPRO) 20 mg tablet   No No   Sig: Take 1 tablet (20 mg total) by mouth daily   esomeprazole (NexIUM) 40 MG capsule   Yes No   Sig: Take 40 mg by mouth   etonogestrel-ethinyl estradiol (NuvaRing) 0.12-0.015 MG/24HR vaginal ring   No No   Sig: Insert ring for 21 days then remove for one week.   inFLIXimab-axxq (Avsola) 100 mg SOLR   Yes No   Sig: Inject 5 mg/kg into a catheter in a vein   indomethacin (INDOCIN) 25 mg capsule   No No   Si-2 tabs BID prn severe headache with food. Hold toradol.   magnesium Oxide (MAG-OX) 400 mg TABS   No No   Sig: Take 1 tablet (400 mg total) by mouth daily   mexiletine (MEXITIL) 150 mg capsule   No No   Sig: Once daily x 1 week, then BID   onabotulinumtoxin A (BOTOX) 100 units  Self Yes No   Sig: Inject as directed   ondansetron (ZOFRAN) 4 mg tablet   Yes No   ondansetron (ZOFRAN-ODT) 4 mg disintegrating tablet   No No   Sig: Take 1 tablet (4 mg total) by mouth every 6 (six) hours as needed for nausea or vomiting   pantoprazole (PROTONIX) 40 mg tablet   No No   Sig: Take 1 tablet (40 mg total) by mouth daily   pramipexole (MIRAPEX) 0.25 mg tablet   No No   Sig: Take 1 tablet (0.25 mg total) by mouth daily at bedtime   prazosin (MINIPRESS) 2 mg capsule   No No   Sig: Take 1 capsule (2 mg total) by mouth daily at bedtime   predniSONE 5 mg tablet   Yes No      Facility-Administered Medications Last Administration Doses Remaining   cyanocobalamin injection 1,000 mcg 8/3/2020  8:59 AM    cyanocobalamin injection 1,000 mcg 2020 11:46 AM    cyanocobalamin injection 1,000 mcg 2020 11:29 AM           Past Medical History:   Diagnosis Date    Abnormal Pap smear of cervix     Allergic     Anxiety     Arthritis     Depression     Diabetes mellitus (HCC)     Fibromyalgia, primary     Hypertension     IBS (irritable bowel syndrome)     Migraine     Obesity     Vitamin B12  deficiency        Past Surgical History:   Procedure Laterality Date    COLONOSCOPY N/A 5/8/2018    Procedure: COLONOSCOPY;  Surgeon: Stephanie Alston MD;  Location: [facility] GI [facility];  Service: Gastroenterology    CT EXC B9 LESION MRGN XCP SK TG S/N/H/F/G > 4.0CM N/A 7/1/2021    Procedure: EXCISION OF PILAR SCALP CYST X 2 AND RIGHT INNER GROIN NEVVUS;  Surgeon: Denis Barron MD;  Location:  MAIN OR;  Service: Plastics    CT REPAIR COMPLEX SCALP/ARM/LEG 2.6-7.5 CM N/A 7/1/2021    Procedure: CLOSURE WOUND SCALP X 2 AND RIGHT INNER GROIN;  Surgeon: Denis Barron MD;  Location:  MAIN OR;  Service: Plastics    TONSILLECTOMY      WISDOM TOOTH EXTRACTION         Family History   Problem Relation Age of Onset    Rheum arthritis Mother     Psoriasis Mother     Other Mother     Hypertension Mother     Diabetes unspecified Mother     Sjogren's syndrome Mother     Alcohol abuse Mother     Drug abuse Mother     Anxiety disorder Father     Alcohol abuse Father     Lung cancer Maternal Grandfather     Cancer Other         bone    Diabetes Other     Other Other         High blood pressure    Depression Maternal Grandmother      I have reviewed and agree with the history as documented.    E-Cigarette/Vaping    E-Cigarette Use Current Some Day User     Start Date 7/1/19     Comments medical marijuana      E-Cigarette/Vaping Substances    Nicotine No     THC Yes     CBD Yes     Flavoring No     Other No     Unknown No      Social History     Tobacco Use    Smoking status: Never     Passive exposure: Past    Smokeless tobacco: Never   Vaping Use    Vaping status: Some Days    Start date: 7/1/2019    Substances: THC, CBD   Substance Use Topics    Alcohol use: Not Currently     Comment: rarely    Drug use: Yes     Frequency: 2.0 times per week     Types: Marijuana     Comment: medical marijuana (vape)       Review of Systems   Constitutional:  Negative for chills and fever.   HENT:  Negative for rhinorrhea and sore throat.    Eyes:   Negative for visual disturbance.   Respiratory:  Negative for cough and shortness of breath.    Cardiovascular:  Negative for chest pain and leg swelling.   Gastrointestinal:  Positive for abdominal pain, diarrhea and nausea. Negative for vomiting.   Genitourinary:  Negative for dysuria.   Musculoskeletal:  Negative for back pain and myalgias.   Skin:  Negative for rash.   Neurological:  Negative for dizziness and headaches.   Psychiatric/Behavioral:  Negative for confusion.    All other systems reviewed and are negative.      Physical Exam  Physical Exam  Vitals and nursing note reviewed.   Constitutional:       Appearance: She is well-developed.   HENT:      Head: Normocephalic and atraumatic.      Right Ear: External ear normal.      Left Ear: External ear normal.      Nose: Nose normal.      Mouth/Throat:      Mouth: Mucous membranes are moist.      Pharynx: No oropharyngeal exudate.   Eyes:      General: No scleral icterus.        Right eye: No discharge.         Left eye: No discharge.      Conjunctiva/sclera: Conjunctivae normal.      Pupils: Pupils are equal, round, and reactive to light.   Cardiovascular:      Rate and Rhythm: Normal rate and regular rhythm.      Pulses: Normal pulses.      Heart sounds: Normal heart sounds.   Pulmonary:      Effort: Pulmonary effort is normal. No respiratory distress.      Breath sounds: Normal breath sounds. No wheezing.   Abdominal:      General: Bowel sounds are normal.      Palpations: Abdomen is soft.      Tenderness: There is abdominal tenderness.   Musculoskeletal:         General: Normal range of motion.      Cervical back: Normal range of motion and neck supple.   Lymphadenopathy:      Cervical: No cervical adenopathy.   Skin:     General: Skin is warm and dry.      Capillary Refill: Capillary refill takes less than 2 seconds.   Neurological:      General: No focal deficit present.      Mental Status: She is alert and oriented to person, place, and time.    Psychiatric:         Mood and Affect: Mood normal.         Behavior: Behavior normal.         Vital Signs  ED Triage Vitals [12/24/23 0812]   Temperature Pulse Respirations Blood Pressure SpO2   (!) 97.4 °F (36.3 °C) 98 20 (!) 166/105 96 %      Temp Source Heart Rate Source Patient Position - Orthostatic VS BP Location FiO2 (%)   Temporal Monitor Lying Left arm --      Pain Score       4           Vitals:    12/24/23 0812 12/24/23 0845   BP: (!) 166/105 133/81   Pulse: 98 91   Patient Position - Orthostatic VS: Lying Sitting         Visual Acuity  Visual Acuity      Flowsheet Row Most Recent Value   L Pupil Size (mm) 3   R Pupil Size (mm) 3            ED Medications  Medications   sodium chloride 0.9 % bolus 1,000 mL (1,000 mL Intravenous New Bag 12/24/23 0845)   ondansetron (ZOFRAN) injection 4 mg (4 mg Intravenous Given 12/24/23 0844)   pantoprazole (PROTONIX) injection 40 mg (40 mg Intravenous Given 12/24/23 0845)   iohexol (OMNIPAQUE) 350 MG/ML injection (MULTI-DOSE) 100 mL (100 mL Intravenous Given 12/24/23 0931)       Diagnostic Studies  Results Reviewed       Procedure Component Value Units Date/Time    Urine Microscopic [[id number]]  (Abnormal) Collected: 12/24/23 0911    Lab Status: Final result Specimen: Urine, Clean Catch Updated: 12/24/23 0937     RBC, UA 1-2 /hpf      WBC, UA 10-20 /hpf      Epithelial Cells Moderate /hpf      Bacteria, UA Occasional /hpf      MUCUS THREADS Moderate    Urine culture [[id number]] Collected: 12/24/23 0911    Lab Status: In process Specimen: Urine, Clean Catch Updated: 12/24/23 0937    UA w Reflex to Microscopic w Reflex to Culture [[id number]]  (Abnormal) Collected: 12/24/23 0911    Lab Status: Final result Specimen: Urine, Clean Catch Updated: 12/24/23 0920     Color, UA Yellow     Clarity, UA Slightly Cloudy     Specific Gravity, UA >=1.030     pH, UA 6.0     Leukocytes, UA Trace     Nitrite, UA Negative     Protein, UA Trace mg/dl      Glucose, UA Negative mg/dl       Ketones, UA Trace mg/dl      Urobilinogen, UA 0.2 E.U./dl      Bilirubin, UA 1+     Occult Blood, UA Negative    POCT pregnancy, urine [[id number]]  (Normal) Resulted: 12/24/23 0912    Lab Status: Final result Updated: 12/24/23 0912     EXT Preg Test, Ur Negative     Control Valid    Basic metabolic panel [[id number]] Collected: 12/24/23 0844    Lab Status: Final result Specimen: Blood from Arm, Left Updated: 12/24/23 0907     Sodium 136 mmol/L      Potassium 3.8 mmol/L      Chloride 104 mmol/L      CO2 22 mmol/L      ANION GAP 10 mmol/L      BUN 7 mg/dL      Creatinine 0.65 mg/dL      Glucose 103 mg/dL      Calcium 8.9 mg/dL      eGFR 117 ml/min/1.73sq m     Narrative:      National Kid[employer] Disease Foundation guidelines for Chronic Kidney Disease (CKD):     Stage 1 with normal or high GFR (GFR > 90 mL/min/1.73 square meters)    Stage 2 Mild CKD (GFR = 60-89 mL/min/1.73 square meters)    Stage 3A Moderate CKD (GFR = 45-59 mL/min/1.73 square meters)    Stage 3B Moderate CKD (GFR = 30-44 mL/min/1.73 square meters)    Stage 4 Severe CKD (GFR = 15-29 mL/min/1.73 square meters)    Stage 5 End Stage CKD (GFR <15 mL/min/1.73 square meters)  Note: GFR calculation is accurate only with a steady state creatinine    Lipase [[id number]]  (Normal) Collected: 12/24/23 0844    Lab Status: Final result Specimen: Blood from Arm, Left Updated: 12/24/23 0907     Lipase 18 u/L     CBC and differential [[id number]]  (Abnormal) Collected: 12/24/23 0844    Lab Status: Final result Specimen: Blood from Arm, Left Updated: 12/24/23 0857     WBC 6.89 Thousand/uL      RBC 4.18 Million/uL      Hemoglobin 12.5 g/dL      Hematocrit 37.5 %      MCV 90 fL      MCH 29.9 pg      MCHC 33.3 g/dL      RDW 13.8 %      MPV 9.1 fL      Platelets 357 Thousands/uL      nRBC 0 /100 WBCs      Neutrophils Relative 44 %      Immat GRANS % 0 %      Lymphocytes Relative 46 %      Monocytes Relative 7 %      Eosinophils Relative 3 %      Basophils Relative 0 %       Neutrophils Absolute 3.05 Thousands/µL      Immature Grans Absolute 0.01 Thousand/uL      Lymphocytes Absolute 3.16 Thousands/µL      Monocytes Absolute 0.45 Thousand/µL      Eosinophils Absolute 0.20 Thousand/µL      Basophils Absolute 0.02 Thousands/µL                    CT abdomen pelvis with contrast   Final Result by Brunilda Cheung MD (12/24 1009)      No acute inflammatory process in the abdomen or pelvis.            Workstation performed: [id number]                    Procedures  Procedures         ED Course                               SBIRT 20yo+      Flowsheet Row Most Recent Value   Initial Alcohol Screen: US AUDIT-C     1. How often do you have a drink containing alcohol? 0 Filed at: 12/24/2023 0843   2. How many drinks containing alcohol do you have on a typical day you are drinking?  0 Filed at: 12/24/2023 0843   3a. Male UNDER 65: How often do you have five or more drinks on one occasion? 0 Filed at: 12/24/2023 0843   3b. FEMALE Any Age, or MALE 65+: How often do you have 4 or more drinks on one occassion? 0 Filed at: 12/24/2023 0843   Audit-C Score 0 Filed at: 12/24/2023 0843   BRUCE: How many times in the past year have you...    Used an illegal drug or used a prescription medication for non-medical reasons? Never Filed at: 12/24/2023 0843                      Medical Decision Making  This is a 33-year-old female with history of chronic diarrhea presented to ED with the complaint of diarrhea abdominal pain and some nausea.  History is taken from patient.    Differential diagnoses include gastroenteritis/irritable bowel syndrome/chronic diarrhea/C. Difficile  Plan will obtain labs CBC BMP lipase CT abdomen pelvis UA urine pregnancy patient is given Zofran and IV fluid.  Labs reviewed lab within normal limits  CT reviewed: Did not show any abnormality.  She is feeling much better after IV fluid and Protonix.  Recommend he continue taking medication at home.  Patient is given referral for GI feels  comfortable going home  Disposition discharge home with strict precaution to return to the ED if notice any worsening symptoms return back to the ED.  Patient verbalized understand the plan discharge home.    Amount and/or Complexity of Data Reviewed  Labs: ordered.  Radiology: ordered.    Risk  Prescription drug management.             Disposition  Final diagnoses:   Abdominal pain   Diarrhea   Nausea     Time reflects when diagnosis was documented in both MDM as applicable and the Disposition within this note       Time User Action Codes Description Comment    12/24/2023 10:14 AM Ahmad, Michael Add [R10.9] Abdominal pain     12/24/2023 10:14 AM Ahmad, Michael Add [R19.7] Diarrhea     12/24/2023 10:14 AM Ahmad, Michael Add [R11.0] Nausea           ED Disposition       ED Disposition   Discharge    Condition   Stable    Date/Time   Sun Dec 24, 2023 0905    Comment   Cruz Schneider discharge to home/self care.                   Follow-up Information       Follow up With Specialties Details Why Contact Info    Oni Beckham, DO Family Medicine Schedule an appointment as soon as possible for a visit in 2 days If symptoms worsen [address] [place] [address]  [phone]              Patient's Medications   Discharge Prescriptions    DICYCLOMINE (BENTYL) 20 MG TABLET    Take 1 tablet (20 mg total) by mouth 2 (two) times a day       Start Date: 12/24/2023End Date: --       Order Dose: 20 mg       Quantity: 20 tablet    Refills: 0    LOPERAMIDE (IMODIUM) 2 MG CAPSULE    Take 1 capsule (2 mg total) by mouth 4 (four) times a day as needed for diarrhea       Start Date: 12/24/2023End Date: --       Order Dose: 2 mg       Quantity: 12 capsule    Refills: 0    ONDANSETRON (ZOFRAN) 4 MG TABLET    Take 1 tablet (4 mg total) by mouth every 6 (six) hours       Start Date: 12/24/2023End Date: --       Order Dose: 4 mg       Quantity: 12 tablet    Refills: 0           PDMP Review         Value Time User    PDMP Reviewed  Yes  10/4/2023 11:12 AM Aliyah Keating MD            ED Provider  Electronically Signed by             Michael Santiago MD  12/24/23 1013

## 2023-12-26 ENCOUNTER — TELEPHONE (OUTPATIENT)
Dept: FAMILY MEDICINE CLINIC | Facility: CLINIC | Age: 33
End: 2023-12-26

## 2023-12-26 LAB — BACTERIA UR CULT: NORMAL

## 2023-12-26 NOTE — TELEPHONE ENCOUNTER
"Of his indication for antibiotic.  If she is having symptoms she should be seen in the office.----- Message from Heidi Mckeon MA sent at 12/26/2023  7:57 AM EST -----  Regarding: urine test  Contact: [phone]  See results      ----- Message -----  From: Cruz Schneider \"Stephanie\"  Sent: 12/26/2023   7:19 AM EST  To: [facility]  Subject: urine test                                       I was at the er over the holiday but my test just posted. would you take a look and see if I have a uti or if it is abnormal for some other reason. do I need meds?  "

## 2023-12-27 ENCOUNTER — OFFICE VISIT (OUTPATIENT)
Dept: GASTROENTEROLOGY | Facility: MEDICAL CENTER | Age: 33
End: 2023-12-27
Payer: COMMERCIAL

## 2023-12-27 VITALS
HEART RATE: 93 BPM | OXYGEN SATURATION: 98 % | SYSTOLIC BLOOD PRESSURE: 128 MMHG | BODY MASS INDEX: 45.71 KG/M2 | WEIGHT: 258 LBS | HEIGHT: 63 IN | TEMPERATURE: 98.5 F | DIASTOLIC BLOOD PRESSURE: 96 MMHG

## 2023-12-27 DIAGNOSIS — R11.0 NAUSEA IN ADULT: ICD-10-CM

## 2023-12-27 DIAGNOSIS — R10.10 UPPER ABDOMINAL PAIN: ICD-10-CM

## 2023-12-27 DIAGNOSIS — R19.7 DIARRHEA: ICD-10-CM

## 2023-12-27 DIAGNOSIS — K21.9 GASTROESOPHAGEAL REFLUX DISEASE, UNSPECIFIED WHETHER ESOPHAGITIS PRESENT: Primary | ICD-10-CM

## 2023-12-27 PROCEDURE — 99214 OFFICE O/P EST MOD 30 MIN: CPT | Performed by: PHYSICIAN ASSISTANT

## 2023-12-27 NOTE — PROGRESS NOTES
[facility] - Outpatient Follow-up Note  Cruz Schneider 33 y.o. female MRN: [id number]  Encounter: [id number]      Assessment and Plan    1. Diarrhea  2. Upper abdominal pain  3. Nausea  The patient has had intermittent bouts of diarrhea chronically. Currently having 2-3 6/7 non bloody [place] stool bowel movements daily with associated abdominal pain and nausea. Recent CT normal. Previous CT 2/2023 with colitis vs under distention. EGD 6/22/2022 with a 2 cm hiatal hernia otherwise normal including biopsies of her stomach and duodenum.  Normal colonoscopy approximately 5 to 6 years ago.  She is currently on Remicade infusions for her ankylosing spondylitis. Diagnosed with IBS in [facility]  -TSH, infectious stool studies, inflammatory markers  -If fecal yohan elevated repeat colonoscopy   -Bentyl for abdominal pain  -If infectious stool studies negative can take as needed lomotil or Imodium sparingly as this causes constipation for her  -20-25g fiber daily     4. GERD  Well controlled on Nexium. Last EGD 6/22/22 with a 2cm hiatal hernia otherwise normal including biopsies for h pylori and celiac.   -Continue Nexium     Follow up 2-3 months     ______________________________________________________________________    History of Present Illness  Cruz Schneider is a 33 y.o. female with HTN, HLD, ankylosing spondylitis, fibromyalgia, anxiety, depression, and migraines here for evaluation of diarrhea.  The patient has had intermittent bouts of diarrhea chronically.  She states she was previously diagnosed with IBS in high school.  Currently she is having 2-3 6/7 [place] stool bowel movements daily.  She denies any blood in her stool.  She does have some associated nausea and upper abdominal pain  relieved with bentyl.  She is also on Nexium with good control of her acid reflux. She had an endoscopy 6/22/2022 with a 2 cm hiatal hernia otherwise normal including biopsies of her stomach and  duodenum.  She also had a normal colonoscopy approximately 5 to 6 years ago.  She is currently on Remicade infusions for her ankylosing spondylitis.       Review of Systems   Constitutional:  Negative for activity change, appetite change, chills, fatigue, fever and unexpected weight change.   Gastrointestinal:  Positive for abdominal pain, diarrhea and nausea. Negative for abdominal distention, anal bleeding, blood in stool, constipation, rectal pain and vomiting.   Musculoskeletal:  Negative for back pain and gait problem.   Psychiatric/Behavioral:  Negative for confusion.        Past Medical History  Past Medical History:   Diagnosis Date    Abnormal Pap smear of cervix     Allergic     Anxiety     Arthritis     Depression     Diabetes mellitus (HCC)     Fibromyalgia, primary     Hypertension     IBS (irritable bowel syndrome)     Migraine     Obesity     Vitamin B12 deficiency        Past Social history  Past Surgical History:   Procedure Laterality Date    COLONOSCOPY N/A 5/8/2018    Procedure: COLONOSCOPY;  Surgeon: Stephanie Alston MD;  Location: [facility];  Service: Gastroenterology    MN EXC B9 LESION MRGN XCP SK TG S/N/H/F/G > 4.0CM N/A 7/1/2021    Procedure: EXCISION OF PILAR SCALP CYST X 2 AND RIGHT INNER GROIN NEVVUS;  Surgeon: Denis Barron MD;  Location:  MAIN OR;  Service: Plastics    MN REPAIR COMPLEX SCALP/ARM/LEG 2.6-7.5 CM N/A 7/1/2021    Procedure: CLOSURE WOUND SCALP X 2 AND RIGHT INNER GROIN;  Surgeon: Denis Barron MD;  Location:  MAIN OR;  Service: Plastics    TONSILLECTOMY      WISDOM TOOTH EXTRACTION       Social History     Socioeconomic History    Marital status: Single     Spouse name: Not on file    Number of children: 0    Years of education: 12    Highest education level: Not on file   Occupational History    Occupation: Unemployed     Employer: [employer]   Tobacco Use    Smoking status: Never     Passive exposure: Past    Smokeless tobacco: Never   Vaping Use    Vaping  status: Some Days    Start date: 7/1/2019    Substances: THC, CBD   Substance and Sexual Activity    Alcohol use: Not Currently     Comment: rarely    Drug use: Yes     Frequency: 2.0 times per week     Types: Marijuana     Comment: medical marijuana (vape)    Sexual activity: Not Currently     Partners: Male     Comment: Nuva ring   Other Topics Concern    Not on file   Social History Narrative    Home:  Living with mom and MGM        Education:    Pt denies any h/o learning disability Dxs but admits to having great difficulty in math requiring a .  She reached childhood milestones on time as far as he knows.    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 On reflection, she feels it was moreso her anxiety as the cause of her leaving school, and she was using the other reason as an excuse so she would not have to admit to anxiety at that time.  She is now very open about her anxiety and does not mind that I have this information in the general social section of her chart.     Social Determinants of Health     Financial Resource Strain: Low Risk  (8/16/2022)    Overall Financial Resource Strain (CARDIA)     Difficulty of Paying Living Expenses: Not hard at all   Food Insecurity: No Food Insecurity (8/16/2022)    Hunger Vital Sign     Worried About Running Out of Food in the Last Year: Never true     Ran Out of Food in the Last Year: Never true   Transportation Needs: No Transportation Needs (8/16/2022)    PRAPARE - Transportation     Lack of Transportation (Medical): No     Lack of Transportation (Non-Medical): No   Physical Activity: Unknown (2/11/2021)    Exercise Vital Sign     Days of Exercise per Week: 0 days     Minutes of Exercise per Session: Not on file   Stress: Not on file   Social  Connections: Not on file   Intimate Partner Violence: Not on file   Housing Stability: Not on file     Social History     Substance and Sexual Activity   Alcohol Use Not Currently    Comment: rarely     Social History     Substance and Sexual Activity   Drug Use Yes    Frequency: 2.0 times per week    Types: Marijuana    Comment: medical marijuana (vape)     Social History     Tobacco Use   Smoking Status Never    Passive exposure: Past   Smokeless Tobacco Never       Past Family History  Family History   Problem Relation Age of Onset    Rheum arthritis Mother     Psoriasis Mother     Other Mother     Hypertension Mother     Diabetes unspecified Mother     Sjogren's syndrome Mother     Alcohol abuse Mother     Drug abuse Mother     Anxiety disorder Father     Alcohol abuse Father     Lung cancer Maternal Grandfather     Cancer Other         bone    Diabetes Other     Other Other         High blood pressure    Depression Maternal Grandmother        Current Medications  Current Outpatient Medications   Medication Sig Dispense Refill    Aimovig 140 MG/ML SOAJ Inject 140mg (1 pen) under the skin every 30 days. 1 mL 11    ALPRAZolam (XANAX) 0.5 mg tablet Take 1 tablet (0.5 mg total) by mouth 2 (two) times a day as needed for anxiety 60 tablet 2    ascorbic acid (VITAMIN C) 500 MG tablet Take 500 mg by mouth daily      buPROPion (WELLBUTRIN XL) 300 mg 24 hr tablet Take 1 tablet (300 mg total) by mouth daily 90 tablet 0    Cholecalciferol (Vitamin D3) 50 MCG (2000 UT) capsule Take 1 capsule (2,000 Units total) by mouth daily 90 capsule 1    Cholecalciferol (Vitamin D3) 50 MCG (2000 UT) TABS       cyanocobalamin 1,000 mcg/mL 1 IM injection every month 3 mL 3    dicyclomine (BENTYL) 20 mg tablet Take 1 tablet (20 mg total) by mouth 2 (two) times a day 20 tablet 0    diphenoxylate-atropine (LOMOTIL) 2.5-0.025 mg per tablet Take 1 tablet by mouth 4 (four) times a day as needed for diarrhea 10 tablet 0    escitalopram (LEXAPRO)  20 mg tablet Take 1 tablet (20 mg total) by mouth daily 90 tablet 0    esomeprazole (NexIUM) 40 MG capsule Take 40 mg by mouth      etonogestrel-ethinyl estradiol (NuvaRing) 0.12-0.015 MG/24HR vaginal ring Insert ring for 21 days then remove for one week. 3 each 4    FeroSul 325 (65 Fe) MG tablet Take 1 tablet (325 mg total) by mouth in the morning 90 tablet 1    indomethacin (INDOCIN) 25 mg capsule 1-2 tabs BID prn severe headache with food. Hold toradol. 30 capsule 0    inFLIXimab-axxq (Avsola) 100 mg SOLR Inject 5 mg/kg into a catheter in a vein      loperamide (IMODIUM) 2 mg capsule Take 1 capsule (2 mg total) by mouth 4 (four) times a day as needed for diarrhea 12 capsule 0    magnesium Oxide (MAG-OX) 400 mg TABS Take 1 tablet (400 mg total) by mouth daily 30 tablet 5    mexiletine (MEXITIL) 150 mg capsule Once daily x 1 week, then  capsule 0    OLANZapine (ZyPREXA) 2.5 mg tablet Take 1 tablet (2.5 mg total) by mouth daily Do not take with reglan. 90 tablet 0    onabotulinumtoxin A (BOTOX) 100 units Inject as directed      ondansetron (ZOFRAN) 4 mg tablet       ondansetron (ZOFRAN) 4 mg tablet Take 1 tablet (4 mg total) by mouth every 6 (six) hours 12 tablet 0    ondansetron (ZOFRAN-ODT) 4 mg disintegrating tablet Take 1 tablet (4 mg total) by mouth every 6 (six) hours as needed for nausea or vomiting 90 tablet 0    pantoprazole (PROTONIX) 40 mg tablet Take 1 tablet (40 mg total) by mouth daily 30 tablet 3    pramipexole (MIRAPEX) 0.25 mg tablet Take 1 tablet (0.25 mg total) by mouth daily at bedtime 34 tablet 3    prazosin (MINIPRESS) 2 mg capsule Take 1 capsule (2 mg total) by mouth daily at bedtime 90 capsule 0    predniSONE 5 mg tablet       Riboflavin 400 MG CAPS One tab daily 30 capsule 5    Trudhesa 0.725 MG/ACT AERS 1 spray in each nostril for total dose of 1.45mg, may repeat 1 dose after 1 hour. Max 2 doses per 24 hours and 3 doses per week. 12 mL 6     Current Facility-Administered Medications  "  Medication Dose Route Frequency Provider Last Rate Last Admin    cyanocobalamin injection 1,000 mcg  1,000 mcg Intramuscular Q30 Days Oni Beckham, DO   1,000 mcg at 08/03/20 0859    cyanocobalamin injection 1,000 mcg  1,000 mcg Intramuscular Q14 Days Oni Beckham, DO   1,000 mcg at 05/18/20 1146    cyanocobalamin injection 1,000 mcg  1,000 mcg Intramuscular Q30 Days Oni Beckham, DO   1,000 mcg at 09/01/20 1129       Allergies  Allergies   Allergen Reactions    Cimzia [Certolizumab Pegol] Swelling    Desvenlafaxine      Other reaction(s): state of confusion    Ixekizumab Vomiting    Seasonal Ic [Octacosanol] Nasal Congestion    Valproic Acid Other (See Comments)     Other reaction(s): dilated pupils, \"schizi\"    Voltaren [Diclofenac] Swelling    Tizanidine Anxiety         The following portions of the patient's history were reviewed and updated as appropriate: allergies, current medications, past medical history, past social history, past surgical history and problem list.      Vitals  Vitals:    12/27/23 1023   BP: 128/96   BP Location: Left arm   Pulse: 93   Temp: 98.5 °F (36.9 °C)   SpO2: 98%   Weight: 117 kg (258 lb)   Height: 5' 3\" (1.6 m)         Physical Exam  Constitutional   General appearance: Patient is seated and in no acute distress, well appearing and well nourished.   Head and Face   Head and face: Normal.    Eyes   Conjunctiva and lids: No erythema, swelling or discharge.  Anicteric.  Ears, Nose, Mouth, and Throat   Hearing: Normal.    Neck: Supple, trachea midline.  Pulmonary   Respiratory effort: No increased work of breathing or signs of respiratory distress.    Cardiovascular   Examination of extremities for edema and/or varicosities: Normal.    Musculoskeletal   Gait and station: Normal   Skin   Skin and subcutaneous tissue: Warm, dry, and intact. No visible jaundice, lesions or rashes.  Psychiatric   Judgment and insight: Normal  Recent and remote memory:  Normal  Mood and affect: " Normal      Results  No visits with results within 1 Day(s) from this visit.   Latest known visit with results is:   Admission on 12/24/2023, Discharged on 12/24/2023   Component Date Value    WBC 12/24/2023 6.89     RBC 12/24/2023 4.18     Hemoglobin 12/24/2023 12.5     Hematocrit 12/24/2023 37.5     MCV 12/24/2023 90     MCH 12/24/2023 29.9     MCHC 12/24/2023 33.3     RDW 12/24/2023 13.8     MPV 12/24/2023 9.1     Platelets 12/24/2023 357     nRBC 12/24/2023 0     Neutrophils Relative 12/24/2023 44     Immat GRANS % 12/24/2023 0     Lymphocytes Relative 12/24/2023 46 (H)     Monocytes Relative 12/24/2023 7     Eosinophils Relative 12/24/2023 3     Basophils Relative 12/24/2023 0     Neutrophils Absolute 12/24/2023 3.05     Immature Grans Absolute 12/24/2023 0.01     Lymphocytes Absolute 12/24/2023 3.16     Monocytes Absolute 12/24/2023 0.45     Eosinophils Absolute 12/24/2023 0.20     Basophils Absolute 12/24/2023 0.02     Sodium 12/24/2023 136     Potassium 12/24/2023 3.8     Chloride 12/24/2023 104     CO2 12/24/2023 22     ANION GAP 12/24/2023 10     BUN 12/24/2023 7     Creatinine 12/24/2023 0.65     Glucose 12/24/2023 103     Calcium 12/24/2023 8.9     eGFR 12/24/2023 117     Lipase 12/24/2023 18     Color, UA 12/24/2023 Yellow     Clarity, UA 12/24/2023 Slightly Cloudy (A)     Specific Gravity, UA 12/24/2023 >=1.030 (H)     pH, UA 12/24/2023 6.0     Leukocytes, UA 12/24/2023 Trace (A)     Nitrite, UA 12/24/2023 Negative     Protein, UA 12/24/2023 Trace (A)     Glucose, UA 12/24/2023 Negative     Ketones, UA 12/24/2023 Trace (A)     Urobilinogen, UA 12/24/2023 0.2     Bilirubin, UA 12/24/2023 1+ (A)     Occult Blood, UA 12/24/2023 Negative     EXT Preg Test, Ur 12/24/2023 Negative     Control 12/24/2023 Valid     RBC, UA 12/24/2023 1-2     WBC, UA 12/24/2023 10-20 (A)     Epithelial Cells 12/24/2023 Moderate (A)     Bacteria, UA 12/24/2023 Occasional     MUCUS THREADS 12/24/2023 Moderate (A)     Urine  Culture 12/24/2023 40,000-49,000 cfu/ml        Radiology Results  CT abdomen pelvis with contrast    Result Date: 12/24/2023  Narrative: CT ABDOMEN AND PELVIS WITH IV CONTRAST INDICATION:   Diarrhea diarrhea. COMPARISON: Comparison is made to prior studies, most recent is a CT scan dated February 19, 2023. TECHNIQUE:  CT examination of the abdomen and pelvis was performed. Axial, sagittal, and coronal 2D reformatted images were created from the source data and submitted for interpretation. Radiation dose length product (DLP) for this visit:  1555.64 mGy-cm .  This examination, like all CT scans performed in the [facility], was performed utilizing techniques to minimize radiation dose exposure, including the use of iterative reconstruction and automated exposure control. IV Contrast:  100 mL of iohexol (OMNIPAQUE) Enteric Contrast:  Enteric contrast was not administered. FINDINGS: ABDOMEN LOWER CHEST:  No clinically significant abnormality identified in the visualized lower chest. LIVER/BILIARY TREE:  Unremarkable. GALLBLADDER:  No calcified gallstones. No pericholecystic inflammatory change. SPLEEN:  Unremarkable. PANCREAS:  Unremarkable. ADRENAL GLANDS:  Unremarkable. KIDNEYS/URETERS:  Unremarkable. No hydronephrosis. STOMACH AND BOWEL: Submucosal fat deposition is noted in the colon and also the terminal ileum, which is a nonspecific finding. No evidence of obstruction. APPENDIX: A normal appendix was visualized. ABDOMINOPELVIC CAVITY:  No ascites.  No pneumoperitoneum.  No lymphadenopathy. VESSELS:  Unremarkable for patient's age. PELVIS REPRODUCTIVE ORGANS:  Unremarkable for patient's age. URINARY BLADDER: Collapsed ABDOMINAL WALL/INGUINAL REGIONS:  Unremarkable. OSSEOUS STRUCTURES:  No acute fracture or destructive osseous lesion.     Impression: No acute inflammatory process in the abdomen or pelvis. Workstation performed: [id number]       Orders  No orders of the defined types were placed in  this encounter.

## 2023-12-30 ENCOUNTER — APPOINTMENT (OUTPATIENT)
Dept: LAB | Facility: CLINIC | Age: 33
End: 2023-12-30
Payer: COMMERCIAL

## 2023-12-30 DIAGNOSIS — R19.7 DIARRHEA: ICD-10-CM

## 2023-12-30 LAB
CRP SERPL QL: 7.9 MG/L
TSH SERPL DL<=0.05 MIU/L-ACNC: 1.16 UIU/ML (ref 0.45–4.5)

## 2023-12-30 PROCEDURE — 86140 C-REACTIVE PROTEIN: CPT

## 2023-12-30 PROCEDURE — 84443 ASSAY THYROID STIM HORMONE: CPT

## 2023-12-30 PROCEDURE — 36415 COLL VENOUS BLD VENIPUNCTURE: CPT

## 2024-01-02 ENCOUNTER — NURSE TRIAGE (OUTPATIENT)
Age: 34
End: 2024-01-02

## 2024-01-02 NOTE — TELEPHONE ENCOUNTER
"Last ov 12/27/23 ELODIA Bhatti, Procedures EGD 6/22/22, Labs 12/30/23, Imaging CT A/P 12/24/23, ER 12/24/23    Patient states last BM Sandeep 1/31/23 x 2 soft stools, none since, notes pain LUQ and across lower abdomen. Patient not really eating or hydrating. I advised go to clears for now and if needs to eat anything keep it bland. Advised she can try MiraLax 17 grams daily to try to produce bowel movement.  Patient has not started fiber supplement to date and her insurance plan does cover OTC if an order could be placed on provider's recommendation. Patient states her urine is darker in color and I advised she can report to ER if she feels she is dehydrated for evaluation. Please review and advise.    Reason for Disposition   MILD constipation    Answer Assessment - Initial Assessment Questions  1. STOOL PATTERN OR FREQUENCY: \"How often do you pass bowel movements (BMs)?\"  (Normal range: tid to q 3 days)  \"When was the last BM passed?\"        Sunday 1/31/23 two   2. STRAINING: \"Do you have to strain to have a BM?\"       no  3. RECTAL PAIN: \"Does your rectum hurt when the stool comes out?\" If Yes, ask: \"Do you have hemorrhoids? How bad is the pain?\"  (Scale 1-10; or mild, moderate, severe)      no  4. STOOL COMPOSITION: \"Are the stools hard?\"       soft  5. BLOOD ON STOOLS: \"Has there been any blood on the toilet tissue or on the surface of the BM?\" If Yes, ask: \"When was the last time?\"       no  6. CHRONIC CONSTIPATION: \"Is this a new problem for you?\"  If no, ask: \"How long have you had this problem?\" (days, weeks, months)       Yes - two months ago  7. CHANGES IN DIET OR HYDRATION: \"Have there been any recent changes in your diet?\" \"How much fluids are you drinking consuming on a daily basis?\"  \"How much have you had to drink today?\"      No changes  8. MEDICATIONS: \"Have you been taking any new medications?\" \"Are you taking any narcotic pain medications?\" (e.g., Vicoden, Percocet, morphine, dilaudid)      no  9. " "LAXATIVES: \"Have you been using any stool softeners, laxatives, or enemas?\"  If yes, ask \"What, how often, and when was the last time?\"  no  10.ACTIVITY:  \"How much walking do you do every day? on a daily basis?\"  \"Has your activity level decreased in the past week?\"         Normal activity  11. CAUSE: \"What do you think is causing the constipation?\"         unknown  12. OTHER SYMPTOMS: \"Do you have any other symptoms?\" (e.g., abdominal pain, bloating, fever, vomiting)        Abdominal pain Left upper and lower across abdomen  13. MEDICAL HISTORY: \"Do you have a history of hemorrhoids, rectal fissures, or rectal surgery or rectal abscess?\"          no  14. PREGNANCY: \"Is there any chance you are pregnant?\" \"When was your last menstrual period?\"        On BCP    Protocols used: Constipation-ADULT-[place]    "

## 2024-01-02 NOTE — TELEPHONE ENCOUNTER
Regarding: constipation/abd pain/unable to eat or drink/dizziness  ----- Message from Summer Sandy sent at 1/2/2024  8:21 AM EST -----  Pt called in requesting to speak to a nurse as she is experiencing constipation, abd pain, unable to eat or drink anything and dizziness. Pt thinks she may be dehydrated.

## 2024-01-02 NOTE — TELEPHONE ENCOUNTER
If symptoms persist I agree with the ED.  If no BM we may want to consider an obstructive series.  For right now I would have patient increase fiber in her diet.  Can consider fiber supplement in the near future.

## 2024-01-03 DIAGNOSIS — G43.709 CHRONIC MIGRAINE WITHOUT AURA WITHOUT STATUS MIGRAINOSUS, NOT INTRACTABLE: ICD-10-CM

## 2024-01-03 RX ORDER — INDOMETHACIN 25 MG/1
CAPSULE ORAL
Qty: 30 CAPSULE | Refills: 6 | Status: SHIPPED | OUTPATIENT
Start: 2024-01-03

## 2024-01-04 NOTE — BH TREATMENT PLAN
NURSING DAILY NOTE    Name: Jenifer Ramos  Date of Admission: 12/30/2023  Admitting Diagnosis: No Principal Problem: There is no principal problem currently on the Problem List. Please update the Problem List and refresh.  Attending Physician: Doron Brand M.d.  Allergies: Azithromycin, Erythromycin, Other misc, Penicillins, Pistachio, Sulfa drugs, Tetraglycine, Other drug, Physostigmine, Tape, Zanaflex [tizanidine hcl], Albuterol, Atorvastatin, Chlorhexidine, Lamisil [terbinafine hcl], and Morphine    Safety  Patient Assist  omod  Patient Precautions  oFall Risk  Precaution Comments  oL foot wound with PRAFO, flu + (no longer on droplet precaution as of 01/03)  Bed Transfer Status  oStandby Assist (FWW edge of bed to w/c)  Toilet Transfer Status  oSupervised  Assistive Devices  oRails, Wheelchair  Oxygen  oNasal Cannula  Diet/Therapeutic Dining  o  Current Diet Order   Procedures    Diet Order Diet: Consistent CHO (Diabetic)     Pill Administration  owhole  Agitated Behavioral Scale  o18  ABS Level of Severity  Lana Agitation    Fall Risk  Has the patient had a fall this admission?  Lana  Sophia Smith Fall Risk Scoring  o18, HIGH RISK  Fall Risk Safety Measures  joseph alarm and chair alarm    Vitals  Temperature: 36.7 °C (98 °F)  Temp src: Temporal  Pulse: 63  Respiration: 20  Blood Pressure : 107/64  Blood Pressure MAP (Calculated): 78 MM HG  BP Location: Right, Upper Arm  Patient BP Position: Supine    Oxygen  Pulse Oximetry: 94 %  O2 (LPM): 1  O2 Delivery Device: Nasal Cannula    Bowel and Bladder  Last Bowel Movement  o01/02/24  Stool Type  oType 5: Soft blob with clear cut edges (passed easily)  Bowel Device  oBathroom  Continent  oBladder: Did not void  oBowel: No movement  Bladder Function  oUrine Void (mL):  (Moderate)  Number of Times Voided: 1  Urine Color: Yellow  Genitourinary Assessment  oBladder Assessment (WDL):  WDL Except  Llamas Catheter: Not Applicable  Urine Color: Yellow  Bladder  Lainey Mann  1990         Date of Initial Treatment Plan: June 26, 2019   Date of Current Treatment Plan: June 17, 2022     Treatment Plan Number 8     Strengths/Personal Resources for Self Care: Support from friends and family; Good therapeutic rapport; Motivation to learn new skills      Diagnosis:   1  Depression, major, recurrent, moderate ([facility] [address] )      2  Generalized anxiety disorder         Area of Needs: Anxiety and depression; physical health issues      Long Term Goal 1: "I want to learn new skills (to manage my anxiety) and I want to follow through with them  I want to be my bubbly self again "     Target Date:           October 15, 2022                                     Treatment Plan Expiration Date:          December 14, 2022   Completion Date: To be determined         Short Term Objectives for Goal 1:             8  Cruz will identify triggers and prompting events that increase symptoms of anxiety and depression    Update June 17, 2022: Natalia Amandakaleb continues to exhibit the ability to identify some of the triggers and prompting events that impact her mood dysregulation  During the past six months, Natalia Walsh has identified that her primary stressors were related to her relationship and break-up with her boyfriend  She also reports that her other stressors involve her physical health issues and/or her relationships with her family members  We will continue to identify stressors, as they arise  We will adjust therapeutic interventions, as needed, to help Stephanie manage her emotions and relationships               8  Cruz will learn and exhibit understanding of a minimum of three distress tolerance skills and emotion regulation skills to assist with symptom reduction  Update June 17, 2022: Natalia Walsh states that she has been using some basic mindfulness skills and some distraction skills   She states that she recently had a period of emotion dysregulation-- related to an Device: Bathroom  Bladder Scan: Post Void  $ Bladder Scan Results (mL): 1    Skin  Guille Score  o 18  Sensory Interventions  o Bed Types: Standard/Trauma Mattress  Skin Preventative Measures: Pillows in Use for Support / Positioning, Silicone Oxygen Tubing in Use  Moisture Interventions  oMoisturizers/Barriers: Barrier Wipes      Pain  Pain Rating Scale  o8 - Awful, hard to do anything  Pain Location  oShoulder, Head, Ear  Pain Location Orientation  oPosterior, Left  Pain Interventions  oMedication (see MAR)    ADLs    Bathing  o   Linen Change  o   Personal Hygiene  oPerineal Care, Moist Antionette Wipes  Chlorhexidine Bath  o   Oral Care  oBrushed Teeth  Teeth/Dentures  o   Shave  o   Nutrition Percentage Eaten  o*  * Meal *  *, Breakfast, 0% Consumed, Refused  Environmental Precautions  oTreaded Slipper Socks on Patient, Personal Belongings, Wastebasket, Call Bell etc. in Easy Reach, Bed in Low Position  Patient Turns/Positioning  oPatient Turns Self from Side to Side  Patient Turns Assistance/Tolerance  o   Bed Positions  Racquel Controls On  Head of Bed Elevated  oSelf regulated      Psychosocial/Neurologic Assessment  Psychosocial Assessment  oPsychosocial (WDL):  WDL Except  Patient Behaviors: Anxious, Irritable, Fatigue  Neurologic Assessment  oNeuro (WDL): Exceptions to WDL  Level of Consciousness: Alert  Orientation Level: Oriented X4  Cognition: Appropriate safety awareness  Speech: Clear  Pupil Assesment: No  Motor Function/Sensation Assessment: Motor strength  Muscle Strength Right Arm: Good Strength Against Gravity and Moderate Resistance  Muscle Strength Left Arm: Fair Strength against Gravity but No Resistance  Muscle Strength Right Leg: Good Strength Against Gravity and Moderate Resistance  Muscle Strength Left Leg: Fair Strength against Gravity but No Resistance  oEENT (WDL):  WDL Except    Cardio/Pulmonary Assessment  Edema  oRLE Edema: 2+  LLE Edema: 2+  Respiratory Breath Sounds  oRUL Breath Sounds:  interaction with her ex-boyfriend's mother  She states that she went on a drive, which she felt was very good for helping her to manage her emotions  She states that she has been exercising more and has been spending time with her best friend  In addition, she identifies that listening to music helps her to calm down when she is feeling very distressed  Elizabeth Katerine states that she would like to continue to learn (and practice) using the mindfulness-based strategies  She states that she would like to have some additional support and encouragement from this therapist to hold her accountable to practice these skill              4  Mc Lazcano will increase her daily physical activity to a duration of 15-30 minutes (including walking, swimming, stretching, etc )    Update June 17, 2022: Elizabethsurya Garcias continues to work on increasing her exercise  She states that she is currently "hooping" for about 1/2 hour each day  She states that, as the weather improves, she will be swimming more often  She states that she wants to work on maintaining her exercise routine-- as she has started these things in the past and then stops engaging in exercise after she has a physical flare-up             1  Mc Lazcano will increase her social interactions (outside of her home) to a minimum of 3x per week  Update June 17, 2022: Elizabeth Garcias continues to spend time with her best friend  She has ended her relationship with her boyfriend  She states that she is not interested in dating at this time, as she wants to work on building her mental health and her self-esteem  She states that she has been reaching out to some of her friends, via social media  She is trying to be very cautious of covid; therefore, meeting with people in public is limited for her at this time               5  Mc Lazcano will learn and utilize formal mindfulness-based strategies a minimum of 10 minutes every day       Update June 17, 2022: Elizabeth Katerine states that she wants to maintain this Clear  RML Breath Sounds: Diminished  RLL Breath Sounds: Diminished  DANIELLE Breath Sounds: Clear  LLL Breath Sounds: Diminished  Cardiac Assessment  oCardiac (WDL):  WDL Except     objective  She states that she has not been engaging in formal mindfulness practice  She does identify that she has found mindfulness to be helpful in the past  We will work on developing her mindfulness practices over the next few months               6  Cruz will maintain a level of anxiety that does not surpass a 4/10 on most days  Incidents that surpass this limit will be process in therapy sessions  Update June 17, 2022: Stephanie completed the PHQ-9 (score of 16, down from 18) and the BESSY-7 (score of 8, down from 15)  We discussed the possibility of some of her PHQ-9 answers being related to her recent physical health flare  She does endorse a significant improvement in her moods  She identifies that her anxiety has been significantly reduced, as a result of ending her relationship with her boyfriend               7  Alma Godoy will maintain adherence with medication management sessions and her medication regimen  Update June 17, 2022: Stephanie continues to see Dr Sammi Shelton for medication management  She states that her medications are very important to her  She states that over the past couple weeks, she had made an error in packaging her medications (specifically her wellbutrin) and she missed taking the medications  She states that she is now back on track and is "on day 5 of taking them morning, afternoon, and night " At this point, she does not yet notice a significant improvement in her mood  We will continue to work on developing ways that Alma Godoy can maintain regular medication adherence  8  "I don't want to be mad and regretful about the relationship with Narciso Gaitan-- and not getting out when I wanted to " We will work on processing her emotions related to the end of this relationship and guilt/shame related to this relationship       New objective as of June 17, 2022       GOAL 1: Modality: Individual 1-2x per month   Completion Date to be determined, Medication Management and The person(s) responsible for carrying out the plan is  Stephanie (client); Perlita Andrade (clinician); Dr Stephanie Long (psychiatrist)     Clinician will use client-centered therapy, mindfulness-based strategies, DBT-informed skills and solution-focused therapy to address Cruz's symptoms of anxiety  Gregparkdonavan will practice skills between sessions and will report back, during subsequent sessions regarding successes and barriers          Behavioral Health Treatment Plan [facility]: Diagnosis and Treatment Plan explained to Sarah Bethdonavan Cruz relates understanding diagnosis and is agreeable to Treatment Plan          Client Comments : Please share your thoughts, feelings, need and/or experiences regarding your treatment plan: "I am ready for it  I am ready to be myself again "     Juan Karimi, 1990, actively participated in the review and update of this treatment plan during a virtual session, using the [address]     Juan Karimi  provided verbal consent on 6/17/2022 at 11:56 AM  The treatment plan was transcribed into the [employer] Record at a later time

## 2024-01-05 ENCOUNTER — OFFICE VISIT (OUTPATIENT)
Dept: FAMILY MEDICINE CLINIC | Facility: CLINIC | Age: 34
End: 2024-01-05
Payer: COMMERCIAL

## 2024-01-05 VITALS
OXYGEN SATURATION: 99 % | HEART RATE: 98 BPM | WEIGHT: 256.4 LBS | DIASTOLIC BLOOD PRESSURE: 88 MMHG | BODY MASS INDEX: 45.43 KG/M2 | HEIGHT: 63 IN | SYSTOLIC BLOOD PRESSURE: 118 MMHG | TEMPERATURE: 97.8 F

## 2024-01-05 DIAGNOSIS — M79.7 FIBROMYALGIA SYNDROME: ICD-10-CM

## 2024-01-05 DIAGNOSIS — K58.0 IRRITABLE BOWEL SYNDROME WITH DIARRHEA: ICD-10-CM

## 2024-01-05 DIAGNOSIS — M45.9 ANKYLOSING SPONDYLITIS, UNSPECIFIED SITE OF SPINE (HCC): Primary | ICD-10-CM

## 2024-01-05 DIAGNOSIS — R10.9 ABDOMINAL PAIN: ICD-10-CM

## 2024-01-05 DIAGNOSIS — R11.0 NAUSEA: ICD-10-CM

## 2024-01-05 PROCEDURE — 99214 OFFICE O/P EST MOD 30 MIN: CPT | Performed by: FAMILY MEDICINE

## 2024-01-05 RX ORDER — GABAPENTIN 300 MG/1
300 CAPSULE ORAL 3 TIMES DAILY
Qty: 90 CAPSULE | Refills: 5 | Status: SHIPPED | OUTPATIENT
Start: 2024-01-05

## 2024-01-05 RX ORDER — ONDANSETRON 4 MG/1
4 TABLET, FILM COATED ORAL EVERY 6 HOURS
Qty: 30 TABLET | Refills: 2 | Status: SHIPPED | OUTPATIENT
Start: 2024-01-05

## 2024-01-05 RX ORDER — DICYCLOMINE HCL 20 MG
20 TABLET ORAL 2 TIMES DAILY
Qty: 20 TABLET | Refills: 0 | Status: SHIPPED | OUTPATIENT
Start: 2024-01-05

## 2024-01-05 NOTE — PROGRESS NOTES
Assessment/Plan: Labs records reviewed.  Patient may Zofran for nausea and as well as IBS flare with diarrhea.  Patient has Bentyl as directed.  Patient will start gabapentin for fibromyalgia/ankylosing spondylitis.  Other guidance given at this time.       Diagnoses and all orders for this visit:    Ankylosing spondylitis, unspecified site of spine (HCC)  -     gabapentin (Neurontin) 300 mg capsule; Take 1 capsule (300 mg total) by mouth 3 (three) times a day    Fibromyalgia syndrome  -     gabapentin (Neurontin) 300 mg capsule; Take 1 capsule (300 mg total) by mouth 3 (three) times a day    Irritable bowel syndrome with diarrhea    Nausea  -     ondansetron (ZOFRAN) 4 mg tablet; Take 1 tablet (4 mg total) by mouth every 6 (six) hours    Abdominal pain  -     dicyclomine (BENTYL) 20 mg tablet; Take 1 tablet (20 mg total) by mouth 2 (two) times a day            Subjective:        Patient ID: Cruz Schneider is a 33 y.o. female.      Patient is here to follow-up on ankylosing spondylitis as well as fibromyalgia.  Patient did see rheumatology at [facility].  Patient will be going for infusion.  Patient wishes to start gabapentin.  Patient with ongoing pains per routine.  Patient with IBS flare recently.  Patient with diarrhea.          The following portions of the patient's history were reviewed and updated as appropriate: allergies, current medications, past family history, past medical history, past social history, past surgical history and problem list.      Review of Systems   Constitutional: Negative.    HENT: Negative.     Eyes: Negative.    Respiratory: Negative.     Cardiovascular: Negative.    Gastrointestinal:  Positive for abdominal pain and diarrhea.   Endocrine: Negative.    Genitourinary: Negative.    Musculoskeletal:  Positive for arthralgias, back pain, myalgias and neck pain.   Skin: Negative.    Allergic/Immunologic: Negative.    Neurological: Negative.    Hematological: Negative.   "  Psychiatric/Behavioral: Negative.             Objective:        Tobacco Cessation Counseling: Tobacco cessation counseling was provided. The patient is sincerely urged to quit consumption of tobacco. She is not ready to quit tobacco.             /88 (BP Location: Right arm, Patient Position: Sitting, Cuff Size: Large)   Pulse 98   Temp 97.8 °F (36.6 °C) (Temporal)   Ht 5' 3\" (1.6 m)   Wt 116 kg (256 lb 6.4 oz)   SpO2 99%   BMI 45.42 kg/m²          Physical Exam  Vitals and nursing note reviewed.   Constitutional:       General: She is not in acute distress.     Appearance: She is ill-appearing. She is not toxic-appearing or diaphoretic.   HENT:      Head: Normocephalic and atraumatic.      Right Ear: Tympanic membrane, ear canal and external ear normal. There is no impacted cerumen.      Left Ear: Tympanic membrane, ear canal and external ear normal. There is no impacted cerumen.      Nose: Nose normal. No congestion or rhinorrhea.   Eyes:      General: No scleral icterus.        Right eye: No discharge.         Left eye: No discharge.   Cardiovascular:      Rate and Rhythm: Normal rate and regular rhythm.      Pulses: Normal pulses.      Heart sounds: Normal heart sounds. No murmur heard.     No friction rub. No gallop.   Pulmonary:      Effort: Pulmonary effort is normal. No respiratory distress.      Breath sounds: Normal breath sounds. No stridor. No wheezing, rhonchi or rales.   Chest:      Chest wall: No tenderness.   Musculoskeletal:         General: Tenderness present. No swelling, deformity or signs of injury.      Cervical back: Neck supple. Tenderness present. No rigidity. No muscular tenderness.      Right lower leg: No edema.      Left lower leg: No edema.   Lymphadenopathy:      Cervical: No cervical adenopathy.   Skin:     General: Skin is warm and dry.      Capillary Refill: Capillary refill takes less than 2 seconds.      Coloration: Skin is not jaundiced.      Findings: No bruising, " erythema, lesion or rash.   Neurological:      Mental Status: She is alert and oriented to person, place, and time. Mental status is at baseline.      Cranial Nerves: No cranial nerve deficit.      Sensory: No sensory deficit.      Motor: No weakness.      Coordination: Coordination normal.      Gait: Gait normal.   Psychiatric:         Mood and Affect: Mood normal.         Behavior: Behavior normal.         Thought Content: Thought content normal.         Judgment: Judgment normal.

## 2024-01-08 ENCOUNTER — TELEMEDICINE (OUTPATIENT)
Dept: PSYCHIATRY | Facility: CLINIC | Age: 34
End: 2024-01-08
Payer: COMMERCIAL

## 2024-01-08 DIAGNOSIS — F43.12 CHRONIC POST-TRAUMATIC STRESS DISORDER (PTSD): ICD-10-CM

## 2024-01-08 DIAGNOSIS — F41.1 GENERALIZED ANXIETY DISORDER: Chronic | ICD-10-CM

## 2024-01-08 DIAGNOSIS — F33.1 DEPRESSION, MAJOR, RECURRENT, MODERATE (HCC): Primary | Chronic | ICD-10-CM

## 2024-01-08 DIAGNOSIS — G43.709 CHRONIC MIGRAINE WITHOUT AURA WITHOUT STATUS MIGRAINOSUS, NOT INTRACTABLE: ICD-10-CM

## 2024-01-08 PROCEDURE — 90833 PSYTX W PT W E/M 30 MIN: CPT | Performed by: PSYCHIATRY & NEUROLOGY

## 2024-01-08 PROCEDURE — 99213 OFFICE O/P EST LOW 20 MIN: CPT | Performed by: PSYCHIATRY & NEUROLOGY

## 2024-01-08 RX ORDER — ALPRAZOLAM 0.5 MG/1
0.5 TABLET ORAL 2 TIMES DAILY PRN
Qty: 60 TABLET | Refills: 2 | Status: SHIPPED | OUTPATIENT
Start: 2024-01-08

## 2024-01-08 RX ORDER — PRAZOSIN HYDROCHLORIDE 2 MG/1
2 CAPSULE ORAL
Qty: 90 CAPSULE | Refills: 0 | Status: SHIPPED | OUTPATIENT
Start: 2024-01-08

## 2024-01-08 RX ORDER — BUPROPION HYDROCHLORIDE 300 MG/1
300 TABLET ORAL DAILY
Qty: 90 TABLET | Refills: 0 | Status: SHIPPED | OUTPATIENT
Start: 2024-01-08

## 2024-01-08 RX ORDER — OLANZAPINE 2.5 MG/1
2.5 TABLET, FILM COATED ORAL DAILY
Qty: 90 TABLET | Refills: 0 | Status: SHIPPED | OUTPATIENT
Start: 2024-01-08

## 2024-01-08 RX ORDER — ESCITALOPRAM OXALATE 20 MG/1
20 TABLET ORAL DAILY
Qty: 90 TABLET | Refills: 0 | Status: SHIPPED | OUTPATIENT
Start: 2024-01-08

## 2024-01-08 NOTE — PSYCH
Virtual Regular Visit    Verification of patient location:    Patient is located at Home in the following state in which I hold an active license [place]      Assessment/Plan:    Problem List Items Addressed This Visit          Cardiovascular and Mediastinum    Chronic migraine without aura without status migrainosus, not intractable    Relevant Medications    OLANZapine (ZyPREXA) 2.5 mg tablet    escitalopram (LEXAPRO) 20 mg tablet    buPROPion (WELLBUTRIN XL) 300 mg 24 hr tablet       Other    Depression, major, recurrent, moderate (HCC) - Primary (Chronic)    Relevant Medications    ALPRAZolam (XANAX) 0.5 mg tablet    OLANZapine (ZyPREXA) 2.5 mg tablet    escitalopram (LEXAPRO) 20 mg tablet    buPROPion (WELLBUTRIN XL) 300 mg 24 hr tablet    Generalized anxiety disorder (Chronic)    Relevant Medications    ALPRAZolam (XANAX) 0.5 mg tablet    OLANZapine (ZyPREXA) 2.5 mg tablet    escitalopram (LEXAPRO) 20 mg tablet    buPROPion (WELLBUTRIN XL) 300 mg 24 hr tablet     Other Visit Diagnoses       Chronic post-traumatic stress disorder (PTSD)        Relevant Medications    ALPRAZolam (XANAX) 0.5 mg tablet    prazosin (MINIPRESS) 2 mg capsule    OLANZapine (ZyPREXA) 2.5 mg tablet    escitalopram (LEXAPRO) 20 mg tablet    buPROPion (WELLBUTRIN XL) 300 mg 24 hr tablet                    Tobacco Cessation Counseling: Tobacco cessation counseling was provided. The patient is sincerely urged to quit consumption of tobacco. She is not ready to quit tobacco.       Reason for visit is   No chief complaint on file.       Encounter provider Aliyah Keating MD    Provider located at [facility] [address] [place] [id number] [phone]      Recent Visits  Date Type Provider Dept   01/05/24 Office Visit Oni Beckham DO [facility]   Showing recent visits within past 7 days and meeting all other requirements  Today's Visits  Date Type  Provider Dept   01/08/24 Telemedicine Aliyah Keating MD [facility]   Showing today's visits and meeting all other requirements  Future Appointments  No visits were found meeting these conditions.  Showing future appointments within next 150 days and meeting all other requirements       The patient was identified by name and date of birth. Cruz Schneider was informed that this is a telemedicine visit and that the visit is being conducted throughthe Epic Embedded platform. She agrees to proceed..  My office door was closed. No one else was in the room.  She acknowledged consent and understanding of privacy and security of the video platform. The patient has agreed to participate and understands they can discontinue the visit at any time.    Patient is aware this is a billable service.     Subjective  Cruz Schneider is a 33 y.o. female with MDD,Panic do, BESSY .Stephanie stated she continues to struggle with anxiety and frequent panic attacks. She tried switching to Xanax XR and her dose was titrated from 0.5 to 2 mg daily gradually. She felt the XR was less effective for her compared with her previous IR 0.5 mg bid prn. We switched  back to Xanax 0.5 mg po bid prn.   She continues to meet with her counselor on a regular basis.      Patient stated she has been dealing with a lot of physical pain due to ankylosing spondylitis and plantar fasciitis.   She stated mood wise she feels she has been stable.  She is also taking medical cannabis which helps with her anxiety and also with her chronic pain.  She is working on a calorie deficit diet and possibly considering medication treatment to assist weight loss.      She stated she has been dealing with IBS flare and she had to stop medications for 2 weeks until she was able to tolerate taking her medications. She stated she is now able to eat once a day now and she resumed her medications couple weeks ago. She stated she was irritable and  anxious after stopping medications, she also felt more depressed.  She also mentioned her GM had recent fall and aspiration with pneumonia that put her in the ICU.   Her mother and herself have been taking turns to help GM. NICOLAS is now home.  She started remicade infusions as well which has helped.  She agrees to continue current treatment  and will schedule follow up in 3 months or sooner if needed.      HPI     Past Medical History:   Diagnosis Date    Abnormal Pap smear of cervix     Allergic     Anxiety     Arthritis     Depression     Diabetes mellitus (HCC)     Fibromyalgia, primary     Hypertension     IBS (irritable bowel syndrome)     Migraine     Obesity     Vitamin B12 deficiency        Past Surgical History:   Procedure Laterality Date    COLONOSCOPY N/A 5/8/2018    Procedure: COLONOSCOPY;  Surgeon: Stephanie Alston MD;  Location: [facility];  Service: Gastroenterology    MO EXC B9 LESION MRGN XCP SK TG S/N/H/F/G > 4.0CM N/A 7/1/2021    Procedure: EXCISION OF PILAR SCALP CYST X 2 AND RIGHT INNER GROIN NEVVUS;  Surgeon: Denis Barron MD;  Location:  MAIN OR;  Service: Plastics    MO REPAIR COMPLEX SCALP/ARM/LEG 2.6-7.5 CM N/A 7/1/2021    Procedure: CLOSURE WOUND SCALP X 2 AND RIGHT INNER GROIN;  Surgeon: Denis Barron MD;  Location:  MAIN OR;  Service: Plastics    TONSILLECTOMY      WISDOM TOOTH EXTRACTION         Current Outpatient Medications   Medication Sig Dispense Refill    ALPRAZolam (XANAX) 0.5 mg tablet Take 1 tablet (0.5 mg total) by mouth 2 (two) times a day as needed for anxiety 60 tablet 2    buPROPion (WELLBUTRIN XL) 300 mg 24 hr tablet Take 1 tablet (300 mg total) by mouth daily 90 tablet 0    escitalopram (LEXAPRO) 20 mg tablet Take 1 tablet (20 mg total) by mouth daily 90 tablet 0    OLANZapine (ZyPREXA) 2.5 mg tablet Take 1 tablet (2.5 mg total) by mouth daily Do not take with reglan. 90 tablet 0    prazosin (MINIPRESS) 2 mg capsule Take 1 capsule (2 mg total) by mouth daily at  bedtime 90 capsule 0    Aimovig 140 MG/ML SOAJ Inject 140mg (1 pen) under the skin every 30 days. 1 mL 11    ascorbic acid (VITAMIN C) 500 MG tablet Take 500 mg by mouth daily      Ascorbic Acid 500 MG CAPS Take 500 mg by mouth daily      Cholecalciferol (Vitamin D3) 50 MCG (2000 UT) capsule Take 1 capsule (2,000 Units total) by mouth daily 90 capsule 1    Cholecalciferol (Vitamin D3) 50 MCG (2000 UT) TABS       cyanocobalamin 1,000 mcg/mL 1 IM injection every month 3 mL 3    dicyclomine (BENTYL) 20 mg tablet Take 1 tablet (20 mg total) by mouth 2 (two) times a day 20 tablet 0    diphenoxylate-atropine (LOMOTIL) 2.5-0.025 mg per tablet Take 1 tablet by mouth 4 (four) times a day as needed for diarrhea 10 tablet 0    esomeprazole (NexIUM) 40 MG capsule Take 40 mg by mouth      etonogestrel-ethinyl estradiol (NuvaRing) 0.12-0.015 MG/24HR vaginal ring Insert ring for 21 days then remove for one week. 3 each 4    FeroSul 325 (65 Fe) MG tablet Take 1 tablet (325 mg total) by mouth in the morning 90 tablet 1    gabapentin (Neurontin) 300 mg capsule Take 1 capsule (300 mg total) by mouth 3 (three) times a day 90 capsule 5    indomethacin (INDOCIN) 25 mg capsule 1-2 tabs BID prn severe headache with food. Hold toradol. 30 capsule 6    inFLIXimab-axxq (Avsola) 100 mg SOLR Inject 5 mg/kg into a catheter in a vein      loperamide (IMODIUM) 2 mg capsule Take 1 capsule (2 mg total) by mouth 4 (four) times a day as needed for diarrhea 12 capsule 0    magnesium Oxide (MAG-OX) 400 mg TABS Take 1 tablet (400 mg total) by mouth daily 30 tablet 5    mexiletine (MEXITIL) 150 mg capsule Once daily x 1 week, then  capsule 0    onabotulinumtoxin A (BOTOX) 100 units Inject as directed      ondansetron (ZOFRAN) 4 mg tablet       ondansetron (ZOFRAN) 4 mg tablet Take 1 tablet (4 mg total) by mouth every 6 (six) hours 30 tablet 2    ondansetron (ZOFRAN-ODT) 4 mg disintegrating tablet Take 1 tablet (4 mg total) by mouth every 6 (six)  "hours as needed for nausea or vomiting 90 tablet 0    pantoprazole (PROTONIX) 40 mg tablet Take 1 tablet (40 mg total) by mouth daily 30 tablet 3    pramipexole (MIRAPEX) 0.25 mg tablet Take 1 tablet (0.25 mg total) by mouth daily at bedtime 34 tablet 3    predniSONE 5 mg tablet       Riboflavin 400 MG CAPS One tab daily 30 capsule 5    Trudhesa 0.725 MG/ACT AERS 1 spray in each nostril for total dose of 1.45mg, may repeat 1 dose after 1 hour. Max 2 doses per 24 hours and 3 doses per week. 12 mL 6     Current Facility-Administered Medications   Medication Dose Route Frequency Provider Last Rate Last Admin    cyanocobalamin injection 1,000 mcg  1,000 mcg Intramuscular Q30 Days Oni Beckham, DO   1,000 mcg at 08/03/20 0859    cyanocobalamin injection 1,000 mcg  1,000 mcg Intramuscular Q14 Days Oni Beckham, DO   1,000 mcg at 05/18/20 1146    cyanocobalamin injection 1,000 mcg  1,000 mcg Intramuscular Q30 Days Oni Beckham, DO   1,000 mcg at 09/01/20 1129        Allergies   Allergen Reactions    Cimzia [Certolizumab Pegol] Swelling    Desvenlafaxine      Other reaction(s): state of confusion    Ixekizumab Vomiting    Seasonal Ic [Octacosanol] Nasal Congestion    Valproic Acid Other (See Comments)     Other reaction(s): dilated pupils, \"schizi\"    Voltaren [Diclofenac] Swelling    Tizanidine Anxiety       Review of Systems    Mood Anxiety and Depression   Behavior Normal    Thought Content Disturbing Thoughts, Feelings   General Emotional Problems and Decreased Functioning   Personality Normal   Other Psych Symptoms Normal   Constitutional Negative   ENT Negative   Cardiovascular Negative   Respiratory Negative   Gastrointestinal Negative   Genitourinary Negative   Musculoskeletal Negative   Integumentary Negative   Neurological Negative   Endocrine Normal    Other Symptoms Normal        Laboratory Results:   Recent Labs (last 12 months):   Appointment on 12/30/2023   Component Date Value    TSH 3RD GENERATON 12/30/2023 " 1.163     CRP 12/30/2023 7.9 (H)    Admission on 12/24/2023, Discharged on 12/24/2023   Component Date Value    WBC 12/24/2023 6.89     RBC 12/24/2023 4.18     Hemoglobin 12/24/2023 12.5     Hematocrit 12/24/2023 37.5     MCV 12/24/2023 90     MCH 12/24/2023 29.9     MCHC 12/24/2023 33.3     RDW 12/24/2023 13.8     MPV 12/24/2023 9.1     Platelets 12/24/2023 357     nRBC 12/24/2023 0     Neutrophils Relative 12/24/2023 44     Immat GRANS % 12/24/2023 0     Lymphocytes Relative 12/24/2023 46 (H)     Monocytes Relative 12/24/2023 7     Eosinophils Relative 12/24/2023 3     Basophils Relative 12/24/2023 0     Neutrophils Absolute 12/24/2023 3.05     Immature Grans Absolute 12/24/2023 0.01     Lymphocytes Absolute 12/24/2023 3.16     Monocytes Absolute 12/24/2023 0.45     Eosinophils Absolute 12/24/2023 0.20     Basophils Absolute 12/24/2023 0.02     Sodium 12/24/2023 136     Potassium 12/24/2023 3.8     Chloride 12/24/2023 104     CO2 12/24/2023 22     ANION GAP 12/24/2023 10     BUN 12/24/2023 7     Creatinine 12/24/2023 0.65     Glucose 12/24/2023 103     Calcium 12/24/2023 8.9     eGFR 12/24/2023 117     Lipase 12/24/2023 18     Color, UA 12/24/2023 Yellow     Clarity, UA 12/24/2023 Slightly Cloudy (A)     Specific Gravity, UA 12/24/2023 >=1.030 (H)     pH, UA 12/24/2023 6.0     Leukocytes, UA 12/24/2023 Trace (A)     Nitrite, UA 12/24/2023 Negative     Protein, UA 12/24/2023 Trace (A)     Glucose, UA 12/24/2023 Negative     Ketones, UA 12/24/2023 Trace (A)     Urobilinogen, UA 12/24/2023 0.2     Bilirubin, UA 12/24/2023 1+ (A)     Occult Blood, UA 12/24/2023 Negative     EXT Preg Test, Ur 12/24/2023 Negative     Control 12/24/2023 Valid     RBC, UA 12/24/2023 1-2     WBC, UA 12/24/2023 10-20 (A)     Epithelial Cells 12/24/2023 Moderate (A)     Bacteria, UA 12/24/2023 Occasional     MUCUS THREADS 12/24/2023 Moderate (A)     Urine Culture 12/24/2023 40,000-49,000 cfu/ml    Admission on 11/21/2023, Discharged on  11/21/2023   Component Date Value    EXT Preg Test, Ur 11/21/2023 Negative     Control 11/21/2023 Valid     SARS-CoV-2 11/21/2023 Negative     INFLUENZA A PCR 11/21/2023 Negative     INFLUENZA B PCR 11/21/2023 Negative     RSV PCR 11/21/2023 Negative    Annual Exam on 11/14/2023   Component Date Value    Case Report 11/14/2023                      Value:Gynecologic Cytology Report                       Case: [id number]                                  Authorizing Provider:  Ignacia López MD  Collected:           11/14/2023 1519              Ordering Location:     [facility]  Received:            11/14/2023 1519                                     [facility]                                                           First Screen:          Leela Finn CT                                                       Rescreen:              Hien Santacruz                                                                  Specimen:    LIQUID-BASED PAP, SCREENING, Cervix, Endocervical                                          Primary Interpretation 11/14/2023 Negative for intraepithelial lesion or malignancy     Specimen Adequacy 11/14/2023 Satisfactory for evaluation. Endocervical/transformation zone component present.     Additional Information 11/14/2023                      Value:This result contains rich text formatting which cannot be displayed here.    Trichomonas vaginalis 11/14/2023 Not Detected     Gardnerella vaginalis 11/14/2023 Not Detected     Candida Species 11/14/2023 Not Detected     HPV Other HR 11/14/2023 Negative     HPV16 11/14/2023 Negative     HPV18 11/14/2023 Negative    Office Visit on 10/17/2023   Component Date Value     RAPID STREP A 10/17/2023 Negative     Throat Culture 10/17/2023 1 colony Beta Hemolytic Streptococcus NOT Group A, C, or G (A)     POCT SARS-CoV-2 Ag 10/17/2023 Negative     VALID CONTROL 10/17/2023 Valid    Office Visit on 09/20/2023   Component Date  Value    Supplier Name 09/20/2023 [employer]     Supplier Phone Number 09/20/2023 [phone]     Order Status 09/20/2023 Delivery Successful     Delivery Request Date 09/20/2023 09/20/2023     Date Delivered  09/20/2023 09/21/2023     Item Description 09/20/2023 Shower Chair (With Back) [$]    Office Visit on 09/15/2023   Component Date Value     RAPID STREP A 09/15/2023 Positive (A)    Admission on 08/10/2023, Discharged on 08/10/2023   Component Date Value    SARS-CoV-2 08/10/2023 Negative     INFLUENZA A PCR 08/10/2023 Negative     INFLUENZA B PCR 08/10/2023 Negative     RSV PCR 08/10/2023 Negative    Office Visit on 08/07/2023   Component Date Value    LEUKOCYTE ESTERASE,UA 08/07/2023 15     NITRITE,UA 08/07/2023 negative     SL AMB POCT UROBILINOGEN 08/07/2023 negative     POCT URINE PROTEIN 08/07/2023 300      PH,UA 08/07/2023 5.0     BLOOD,UA 08/07/2023 trace     SPECIFIC GRAVITY,UA 08/07/2023 1.020     KETONES,UA 08/07/2023 80     BILIRUBIN,UA 08/07/2023 negative     GLUCOSE, UA 08/07/2023 negatives      COLOR,UA 08/07/2023 dark yellow     CLARITY,UA 08/07/2023 cloudy    Office Visit on 08/01/2023   Component Date Value    LEUKOCYTE ESTERASE,UA 08/01/2023 small     NITRITE,UA 08/01/2023 neg     SL AMB POCT UROBILINOGEN 08/01/2023 neg     POCT URINE PROTEIN 08/01/2023 neg      PH,UA 08/01/2023 6.0     BLOOD,UA 08/01/2023 trace     SPECIFIC GRAVITY,UA 08/01/2023 1.025     KETONES,UA 08/01/2023 trace     BILIRUBIN,UA 08/01/2023 neg     GLUCOSE, UA 08/01/2023 neg      COLOR,UA 08/01/2023 yellow     CLARITY,UA 08/01/2023 clear     URINE HCG 08/01/2023 neg     Urine Culture 08/01/2023 >100,000 cfu/ml    There may be more visits with results that are not included.     Substance Abuse History:  Social History     Substance and Sexual Activity   Drug Use Yes    Frequency: 2.0 times per week    Types: Marijuana    Comment: medical marijuana (vape)       Family Psychiatric History:   Family  History   Problem Relation Age of Onset    Rheum arthritis Mother     Psoriasis Mother     Other Mother     Hypertension Mother     Diabetes unspecified Mother     Sjogren's syndrome Mother     Alcohol abuse Mother     Drug abuse Mother     Anxiety disorder Father     Alcohol abuse Father     Lung cancer Maternal Grandfather     Cancer Other         bone    Diabetes Other     Other Other         High blood pressure    Depression Maternal Grandmother        The following portions of the patient's history were reviewed and updated as appropriate: allergies, current medications, past family history, past medical history, past social history, past surgical history and problem list.    Social History     Socioeconomic History    Marital status: Single     Spouse name: Not on file    Number of children: 0    Years of education: 12    Highest education level: Not on file   Occupational History    Occupation: Unemployed     Employer: [email or web address]   Tobacco Use    Smoking status: Never     Passive exposure: Past    Smokeless tobacco: Never   Vaping Use    Vaping status: Some Days    Start date: 7/1/2019    Substances: THC, CBD   Substance and Sexual Activity    Alcohol use: Not Currently     Comment: rarely    Drug use: Yes     Frequency: 2.0 times per week     Types: Marijuana     Comment: medical marijuana (vape)    Sexual activity: Not Currently     Partners: Male     Comment: Nuva ring   Other Topics Concern    Not on file   Social History Narrative    Home:  Living with mom and MGM        Education:    Pt denies any h/o learning disability Dxs but admits to having great difficulty in math requiring a .  She reached childhood milestones on time as far as he knows.    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  On reflection, she feels it was moreso her anxiety as the cause of her leaving school, and she was using the other reason as an excuse so she would not have to admit to anxiety at that time.  She is now very open about her anxiety and does not mind that I have this information in the general social section of her chart.     Social Determinants of Health     Financial Resource Strain: Low Risk  (8/16/2022)    Overall Financial Resource Strain (CARDIA)     Difficulty of Paying Living Expenses: Not hard at all   Food Insecurity: No Food Insecurity (8/16/2022)    Hunger Vital Sign     Worried About Running Out of Food in the Last Year: Never true     Ran Out of Food in the Last Year: Never true   Transportation Needs: No Transportation Needs (8/16/2022)    PRAPARE - Transportation     Lack of Transportation (Medical): No     Lack of Transportation (Non-Medical): No   Physical Activity: Unknown (2/11/2021)    Exercise Vital Sign     Days of Exercise per Week: 0 days     Minutes of Exercise per Session: Not on file   Stress: Not on file   Social Connections: Not on file   Intimate Partner Violence: Not on file   Housing Stability: Not on file     Social History     Social History Narrative    Home:  Living with mom and MGM        Education:    Pt denies any h/o learning disability Dxs but admits to having great difficulty in math requiring a .  She reached childhood milestones on time as far as he knows.    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 On reflection, she feels it was moreso her anxiety as the cause of her leaving school, and she was using the other reason as an excuse so she would not have to admit to anxiety at that time.  She is now very open about her anxiety and does not mind that I have  this information in the general social section of her chart.       Objective:       Mental status:  Appearance calm and cooperative , adequate hygiene and grooming and good eye contact    Mood dysphoric   Affect affect was constricted   Speech a normal rate and fluent   Thought Processes coherent/organized and normal thought processes   Hallucinations no hallucinations present    Thought Content no delusions   Abnormal Thoughts no suicidal thoughts  and no homicidal thoughts    Orientation  oriented to person and place and time   Remote Memory short term memory intact and long term memory intact   Attention Span concentration intact   Intellect Appears to be of Average Intelligence   Insight Limited insight   Judgement judgment was limited   Muscle Strength n/a   Language no difficulty naming common objects and no difficulty repeating a phrase    Fund of Knowledge displays adequate knowledge of current events, adequate fund of knowledge regarding past history and adequate fund of knowledge regarding vocabulary                Assessment/Plan:       Diagnoses and all orders for this visit:    Depression, major, recurrent, moderate (HCC)    Generalized anxiety disorder  -     ALPRAZolam (XANAX) 0.5 mg tablet; Take 1 tablet (0.5 mg total) by mouth 2 (two) times a day as needed for anxiety  -     escitalopram (LEXAPRO) 20 mg tablet; Take 1 tablet (20 mg total) by mouth daily  -     buPROPion (WELLBUTRIN XL) 300 mg 24 hr tablet; Take 1 tablet (300 mg total) by mouth daily    Chronic post-traumatic stress disorder (PTSD)  -     prazosin (MINIPRESS) 2 mg capsule; Take 1 capsule (2 mg total) by mouth daily at bedtime    Chronic migraine without aura without status migrainosus, not intractable  -     OLANZapine (ZyPREXA) 2.5 mg tablet; Take 1 tablet (2.5 mg total) by mouth daily Do not take with reglan.    Other orders  -     Ascorbic Acid 500 MG CAPS; Take 500 mg by mouth daily              Treatment Recommendations- Risks  Benefits      Immediate Medical/Psychiatric/Psychotherapy Treatments and Any Precautions: continue current treatment     Risks, Benefits And Possible Side Effects Of Medications:  {PSYCH RISK, BENEFITS AND POSSIBLE SIDE EFFECTS (Optional):66495    Controlled Medication Discussion: Discussed with patient Black Box warning on concurrent use of benzodiazepines and opioid medications including sedation, respiratory depression, coma and death. Patient understands the risk of treatment with benzodiazepines in addition to opioids and wants to continue taking those medications. , Discussed with patient the risks of sedation, respiratory depression, impairment of ability to drive and potential for abuse and addiction related to treatment with benzodiazepine medications. The patient understands risk of treatment with benzodiazepine medications, agrees to not drive if feels impaired and agrees to take medications as prescribed. and The patient has been filling controlled prescriptions on time as prescribed to [place] Prescription Drug Monitoring program.      Psychotherapy Provided: Individual psychotherapy provided.     Individual psychotherapy provided: Yes  Counseling was provided during the session today for 16 minutes.  Medications, treatment progress and treatment plan reviewed with Cruz.  Medication education provided to Cruz.  Goals discussed during in session: improve control of anxiety and improve control of depression.   Recent stressor including relationship problems, health issues, medical problems, limited support, social difficulties, everyday stressors, ongoing anxiety and chronic mental illness discussed with Cruz.   Coping strategies including compliance with medications, contacting a therapist, deep/slow breathing, eliminating avoidance, engaging in previously avoided activities, exercising, getting into a good routine, improving self-esteem, increasing energy, increasing interest in  usual activities, increasing motivation, increasing social interaction, keeping busy at home, maintain healthy diet, maintain heathy sleeping hygiene and maintain positive attitude reviewed with Cruz.   Importance of medication and treatment compliance reviewed with Cruz.  Educated on importance of medication and treatment compliance.  Importance of follow up with family physician for medical issues reviewed with Cruz.  Discussed with Cruz acceptance of mental illness diagnosis and need for ongoing psychiatric treatment.  Supportive therapy provided.                  Visit Time    Visit Start Time: 9:30  Visit Stop Time: 10:00  Total Visit Duration:  30 minutes

## 2024-01-11 ENCOUNTER — TELEPHONE (OUTPATIENT)
Dept: PSYCHIATRY | Facility: CLINIC | Age: 34
End: 2024-01-11

## 2024-01-11 NOTE — TELEPHONE ENCOUNTER
The patient contacted the office in regards to her Virtual psych encounter form. The patient stated she has been in her MyChart and does not see the form to sign. The writer printed out a Psych encounter form and emailed it to the patient for completion.

## 2024-01-11 NOTE — TELEPHONE ENCOUNTER
Left voicemail informing patient of the Psych Encounter form needing to be signed as a requirement from the insurance company for billing purposes. Patient can access form via [employer] and sign electronically.     Please make patient aware this form must be signed for each visit as a requirement to continue future visits with provider.

## 2024-01-12 ENCOUNTER — TELEMEDICINE (OUTPATIENT)
Dept: BEHAVIORAL/MENTAL HEALTH CLINIC | Facility: CLINIC | Age: 34
End: 2024-01-12
Payer: COMMERCIAL

## 2024-01-12 DIAGNOSIS — F41.1 GENERALIZED ANXIETY DISORDER: Chronic | ICD-10-CM

## 2024-01-12 DIAGNOSIS — F33.1 DEPRESSION, MAJOR, RECURRENT, MODERATE (HCC): Primary | Chronic | ICD-10-CM

## 2024-01-12 PROCEDURE — 90837 PSYTX W PT 60 MINUTES: CPT | Performed by: SOCIAL WORKER

## 2024-01-12 NOTE — PSYCH
Virtual Regular Visit    Verification of patient location:    Patient is located at Home in the following state in which I hold an active license [place]    Assessment/Plan:    Problem List Items Addressed This Visit          Other    Generalized anxiety disorder (Chronic)    Depression, major, recurrent, moderate (HCC) - Primary (Chronic)     Goals addressed in session: Goal 1      Reason for visit is   Chief Complaint   Patient presents with    Virtual Regular Visit     Encounter provider APARNA Fox    Provider located at [facility] [place]  [facility] [place]  [address]  [place] [address] [phone]    Recent Visits  Date Type Provider Dept   01/17/24 Telephone Oni Beckham DO [facility]   01/17/24 Office Visit Oni Beckham DO [facility]   Showing recent visits within past 7 days and meeting all other requirements  Future Appointments  No visits were found meeting these conditions.  Showing future appointments within next 150 days and meeting all other requirements    The patient was identified by name and date of birth. Cruz Schneider was informed that this is a telemedicine visit and that the visit is being conducted throughthe Epic Embedded platform. She agrees to proceed..  My office door was closed. No one else was in the room.  She acknowledged consent and understanding of privacy and security of the video platform. The patient has agreed to participate and understands they can discontinue the visit at any time.    Patient is aware this is a billable service.     Subjective  Cruz Schneider is a 33 y.o. female.    HPI     Past Medical History:   Diagnosis Date    Abnormal Pap smear of cervix     Allergic     Anxiety     Arthritis     Depression     Fibromyalgia, primary     GERD (gastroesophageal reflux disease)     Hypertension     IBS (irritable bowel syndrome)     Migraine     Obesity     Vitamin  B12 deficiency        Past Surgical History:   Procedure Laterality Date    COLONOSCOPY N/A 5/8/2018    Procedure: COLONOSCOPY;  Surgeon: Stephanie Alston MD;  Location: [facility];  Service: Gastroenterology    MA EXC B9 LESION MRGN XCP SK TG S/N/H/F/G > 4.0CM N/A 7/1/2021    Procedure: EXCISION OF PILAR SCALP CYST X 2 AND RIGHT INNER GROIN NEVVUS;  Surgeon: Denis Barron MD;  Location:  MAIN OR;  Service: Plastics    MA REPAIR COMPLEX SCALP/ARM/LEG 2.6-7.5 CM N/A 7/1/2021    Procedure: CLOSURE WOUND SCALP X 2 AND RIGHT INNER GROIN;  Surgeon: Denis Barron MD;  Location:  MAIN OR;  Service: Plastics    TONSILLECTOMY      WISDOM TOOTH EXTRACTION         Current Outpatient Medications   Medication Sig Dispense Refill    Aimovig 140 MG/ML SOAJ Inject 140mg (1 pen) under the skin every 30 days. 1 mL 11    ALPRAZolam (XANAX) 0.5 mg tablet Take 1 tablet (0.5 mg total) by mouth 2 (two) times a day as needed for anxiety 60 tablet 2    ascorbic acid (VITAMIN C) 500 MG tablet Take 500 mg by mouth daily      Ascorbic Acid 500 MG CAPS Take 500 mg by mouth daily (Patient not taking: Reported on 1/17/2024)      buPROPion (WELLBUTRIN XL) 300 mg 24 hr tablet Take 1 tablet (300 mg total) by mouth daily 90 tablet 0    Cholecalciferol (Vitamin D3) 50 MCG (2000 UT) capsule Take 1 capsule (2,000 Units total) by mouth daily 90 capsule 1    Cholecalciferol (Vitamin D3) 50 MCG (2000 UT) TABS       Cyanocobalamin (Vitamin B-12) 500 MCG SUBL Place 1 tablet (500 mcg total) under the tongue in the morning 30 tablet 2    cyanocobalamin 1,000 mcg/mL 1 IM injection every month 3 mL 3    dicyclomine (BENTYL) 20 mg tablet Take 1 tablet (20 mg total) by mouth 2 (two) times a day 20 tablet 0    diphenoxylate-atropine (LOMOTIL) 2.5-0.025 mg per tablet Take 1 tablet by mouth 4 (four) times a day as needed for diarrhea (Patient not taking: Reported on 1/17/2024) 10 tablet 0    doxycycline hyclate (VIBRAMYCIN) 100 mg capsule Take 1 capsule  (100 mg total) by mouth every 12 (twelve) hours for 10 days 20 capsule 0    escitalopram (LEXAPRO) 20 mg tablet Take 1 tablet (20 mg total) by mouth daily 90 tablet 0    esomeprazole (NexIUM) 40 MG capsule Take 40 mg by mouth      etonogestrel-ethinyl estradiol (NuvaRing) 0.12-0.015 MG/24HR vaginal ring Insert ring for 21 days then remove for one week. 3 each 4    FeroSul 325 (65 Fe) MG tablet Take 1 tablet (325 mg total) by mouth in the morning 90 tablet 1    gabapentin (Neurontin) 300 mg capsule Take 1 capsule (300 mg total) by mouth 3 (three) times a day 90 capsule 5    indomethacin (INDOCIN) 25 mg capsule 1-2 tabs BID prn severe headache with food. Hold toradol. 30 capsule 6    inFLIXimab-axxq (Avsola) 100 mg SOLR Inject 5 mg/kg into a catheter in a vein      loperamide (IMODIUM) 2 mg capsule Take 1 capsule (2 mg total) by mouth 4 (four) times a day as needed for diarrhea (Patient not taking: Reported on 1/17/2024) 12 capsule 0    magnesium Oxide (MAG-OX) 400 mg TABS Take 1 tablet (400 mg total) by mouth daily 30 tablet 5    methocarbamol (ROBAXIN) 500 mg tablet       mexiletine (MEXITIL) 150 mg capsule Once daily x 1 week, then BID (Patient not taking: Reported on 1/17/2024) 180 capsule 0    mupirocin (BACTROBAN) 2 % ointment Apply topically 2 (two) times a day 15 g 0    OLANZapine (ZyPREXA) 2.5 mg tablet Take 1 tablet (2.5 mg total) by mouth daily Do not take with reglan. 90 tablet 0    onabotulinumtoxin A (BOTOX) 100 units Inject as directed      ondansetron (ZOFRAN) 4 mg tablet       ondansetron (ZOFRAN) 4 mg tablet Take 1 tablet (4 mg total) by mouth every 6 (six) hours 30 tablet 2    ondansetron (ZOFRAN-ODT) 4 mg disintegrating tablet Take 1 tablet (4 mg total) by mouth every 6 (six) hours as needed for nausea or vomiting 90 tablet 0    pantoprazole (PROTONIX) 40 mg tablet Take 1 tablet (40 mg total) by mouth daily (Patient not taking: Reported on 1/17/2024) 30 tablet 3    pramipexole (MIRAPEX) 0.25 mg  "tablet Take 1 tablet (0.25 mg total) by mouth daily at bedtime 34 tablet 3    prazosin (MINIPRESS) 2 mg capsule Take 1 capsule (2 mg total) by mouth daily at bedtime 90 capsule 0    predniSONE 5 mg tablet  (Patient not taking: Reported on 1/17/2024)      Riboflavin 400 MG CAPS One tab daily (Patient not taking: Reported on 1/17/2024) 30 capsule 5    Trudhesa 0.725 MG/ACT AERS 1 spray in each nostril for total dose of 1.45mg, may repeat 1 dose after 1 hour. Max 2 doses per 24 hours and 3 doses per week. 12 mL 6     Current Facility-Administered Medications   Medication Dose Route Frequency Provider Last Rate Last Admin    cyanocobalamin injection 1,000 mcg  1,000 mcg Intramuscular Q30 Days Oni Beckham, DO   1,000 mcg at 08/03/20 0859    cyanocobalamin injection 1,000 mcg  1,000 mcg Intramuscular Q14 Days Oni Beckham, DO   1,000 mcg at 05/18/20 1146    cyanocobalamin injection 1,000 mcg  1,000 mcg Intramuscular Q30 Days Oni Beckham, DO   1,000 mcg at 01/17/24 1407    cyanocobalamin injection 1,000 mcg  1,000 mcg Intramuscular Q30 Days Oni Beckham, DO   1,000 mcg at 01/17/24 1548        Allergies   Allergen Reactions    Cimzia [Certolizumab Pegol] Swelling    Desvenlafaxine      Other reaction(s): state of confusion    Ixekizumab Vomiting    Seasonal Ic [Octacosanol] Nasal Congestion    Valproic Acid Other (See Comments)     Other reaction(s): dilated pupils, \"schizi\"    Voltaren [Diclofenac] Swelling    Tizanidine Anxiety       Review of Systems    Video Exam    There were no vitals filed for this visit.    Physical Exam     Behavioral Health Psychotherapy Progress Note    Psychotherapy Provided: Individual Psychotherapy     1. Depression, major, recurrent, moderate (HCC)        2. Generalized anxiety disorder            Goals addressed in session: Goal 1     DATA: Met with Stephanie for scheduled individual session. She initially discussed frustration re: the Psych Encounter Form-- which she reports she did not " "receive from her previous appointment with Dr. Nunn. This clinician assisted with problem-solving this concern. Stephanie discussed the events since her last session. She discussed feeling ill and having an IBS-flare. She continues to follow up with her medical providers regarding her physical health concerns. Stephanie discussed the holidays--spending time with her friend, Domonique. She states that she and Domonique had a difficult time, when Stephanie expressed that she needed help from Domonique, and Domonique was not available to help her. She states that she tried to talk with her friend about it, and her response was, \"You know I'm not good at this and that I am a bad friend.\" Stephanie states that she is working on putting some distance between her and her friend. Stephanie discussed her family relationships. She continues to have tension with her grandmother. She states that she is trying to breathe and walk away when her grandmother is irritable with her. We discussed ways that Stephanie can focus on her own wellness, by participating in activities that she enjoys.     During this session, this clinician used the following therapeutic modalities: Client-centered Therapy, Dialectical Behavior Therapy, Mindfulness-based Strategies, Motivational Interviewing, Solution-Focused Therapy, and Supportive Psychotherapy    Substance Abuse was not addressed during this session. If the client is diagnosed with a co-occurring substance use disorder, please indicate any changes in the frequency or amount of use: n/a. Stage of change for addressing substance use diagnoses: No substance use/Not applicable    ASSESSMENT:  Stephanie Schneider presents with a Euthymic/ normal mood.     her affect is Normal range and intensity, which is congruent, with her mood and the content of the session. The client has made progress on their goals.    Stephanie Schneider presents with a minimal risk of suicide, minimal risk of self-harm, and minimal risk of harm to " "others.    For any risk assessment that surpasses a \"low\" rating, a safety plan must be developed.    A safety plan was indicated: no  If yes, describe in detail n/a    PLAN: Between sessions, Stephanie Schneider will continue to monitor her moods. She will continue to practice effective communication skills when interacting with her grandmother. She will continue to work on engaging activities that bring her ciara. At the next session, the therapist will use Client-centered Therapy, Dialectical Behavior Therapy, Mindfulness-based Strategies, Motivational Interviewing, Solution-Focused Therapy, and Supportive Psychotherapy to address her mood regulation and relationship concerns.    Behavioral Health Treatment Plan and Discharge Planning: Stephanie Schenider is aware of and agrees to continue to work on their treatment plan. They have identified and are working toward their discharge goals. yes    Visit start and stop times:    01/12/24  Start Time: 0903  Stop Time: 0958  Total Visit Time: 55 minutes        "

## 2024-01-16 ENCOUNTER — TELEPHONE (OUTPATIENT)
Dept: NEUROLOGY | Facility: CLINIC | Age: 34
End: 2024-01-16

## 2024-01-16 NOTE — TELEPHONE ENCOUNTER
Called [facility] and scheduled delivery with Shirlene for:     Botox-200 units  Qty:1  Delivery to [place]  Scheduled for 01/17/2024  Via: [employer]     Please advise if medication doesn't arrive.

## 2024-01-17 ENCOUNTER — TELEPHONE (OUTPATIENT)
Age: 34
End: 2024-01-17

## 2024-01-17 ENCOUNTER — OFFICE VISIT (OUTPATIENT)
Dept: FAMILY MEDICINE CLINIC | Facility: CLINIC | Age: 34
End: 2024-01-17
Payer: COMMERCIAL

## 2024-01-17 ENCOUNTER — TELEPHONE (OUTPATIENT)
Dept: FAMILY MEDICINE CLINIC | Facility: CLINIC | Age: 34
End: 2024-01-17

## 2024-01-17 VITALS
SYSTOLIC BLOOD PRESSURE: 120 MMHG | RESPIRATION RATE: 18 BRPM | TEMPERATURE: 97.4 F | OXYGEN SATURATION: 98 % | HEART RATE: 95 BPM | WEIGHT: 259 LBS | DIASTOLIC BLOOD PRESSURE: 80 MMHG | HEIGHT: 63 IN | BODY MASS INDEX: 45.89 KG/M2

## 2024-01-17 DIAGNOSIS — E53.8 VITAMIN B12 DEFICIENCY: Primary | ICD-10-CM

## 2024-01-17 DIAGNOSIS — J01.91 ACUTE RECURRENT SINUSITIS, UNSPECIFIED LOCATION: ICD-10-CM

## 2024-01-17 PROBLEM — J01.90 ACUTE INFECTION OF NASAL SINUS: Status: ACTIVE | Noted: 2024-01-17

## 2024-01-17 PROCEDURE — 96372 THER/PROPH/DIAG INJ SC/IM: CPT

## 2024-01-17 PROCEDURE — 99214 OFFICE O/P EST MOD 30 MIN: CPT | Performed by: FAMILY MEDICINE

## 2024-01-17 RX ORDER — CYANOCOBALAMIN 1000 UG/ML
1000 INJECTION, SOLUTION INTRAMUSCULAR; SUBCUTANEOUS
Status: SHIPPED | OUTPATIENT
Start: 2024-01-17

## 2024-01-17 RX ORDER — METHOCARBAMOL 500 MG/1
TABLET, FILM COATED ORAL
COMMUNITY
Start: 2024-01-17

## 2024-01-17 RX ORDER — CYANOCOBALAMIN 1000 UG/ML
1000 INJECTION, SOLUTION INTRAMUSCULAR; SUBCUTANEOUS
Status: CANCELLED | OUTPATIENT
Start: 2024-01-17

## 2024-01-17 RX ORDER — DOXYCYCLINE HYCLATE 100 MG/1
100 CAPSULE ORAL EVERY 12 HOURS SCHEDULED
Qty: 20 CAPSULE | Refills: 0 | Status: SHIPPED | OUTPATIENT
Start: 2024-01-17 | End: 2024-01-27

## 2024-01-17 RX ORDER — CYANOCOBALAMIN (VITAMIN B-12) 1000 MCG
1 TABLET, SUBLINGUAL SUBLINGUAL DAILY
Qty: 30 TABLET | Refills: 2 | Status: SHIPPED | OUTPATIENT
Start: 2024-01-17 | End: 2024-04-16

## 2024-01-17 RX ADMIN — CYANOCOBALAMIN 1000 MCG: 1000 INJECTION, SOLUTION INTRAMUSCULAR; SUBCUTANEOUS at 15:48

## 2024-01-17 RX ADMIN — CYANOCOBALAMIN 1000 MCG: 1000 INJECTION, SOLUTION INTRAMUSCULAR; SUBCUTANEOUS at 14:07

## 2024-01-17 NOTE — TELEPHONE ENCOUNTER
Botox number of units:200  Botox quantity:1  Arrived at what location:[facility]  Botox at Correct Administering Location: Yes  NDC number:3482-5133-06  Lot number: [id number]  Expiration Date: 05/2026  Appt notes indicate correct medication: Yes  Botox received, and stored in fridge also logged and paperwork was scanned.

## 2024-01-17 NOTE — PROGRESS NOTES
Assessment/Plan: Patient have vitamin B12 injection at this time for B12 deficiency.  Patient will start sublingual vitamin B12 and will have level drawn in the next few months.  Patient will see ENT in follow-up.  Patient will use Bactroban as well as doxycycline as directed.  Follow-up as needed       Diagnoses and all orders for this visit:    Vitamin B12 deficiency  -     cyanocobalamin injection 1,000 mcg  -     Cyanocobalamin (Vitamin B-12) 500 MCG SUBL; Place 1 tablet (500 mcg total) under the tongue in the morning  -     Vitamin B12; Future    Acute recurrent sinusitis, unspecified location  -     mupirocin (BACTROBAN) 2 % ointment; Apply topically 2 (two) times a day  -     doxycycline hyclate (VIBRAMYCIN) 100 mg capsule; Take 1 capsule (100 mg total) by mouth every 12 (twelve) hours for 10 days            Subjective:        Patient ID: Cruz Schneider is a 33 y.o. female.      Patient is here with scabs inside of nares bilaterally worsening over the past week.  Patient did use Ayr nasal gel.  No fevers.  Patient with nasal congestion.  Patient also following up on B12 deficiency.  Patient wishes injection at this time    Foreign Body in Nose  Associated symptoms include congestion.         The following portions of the patient's history were reviewed and updated as appropriate: allergies, current medications, past family history, past medical history, past social history, past surgical history and problem list.      Review of Systems   Constitutional:  Positive for fatigue.   HENT:  Positive for congestion and sinus pressure.    Eyes: Negative.    Respiratory: Negative.     Cardiovascular: Negative.    Gastrointestinal: Negative.    Endocrine: Negative.    Genitourinary: Negative.    Musculoskeletal: Negative.    Skin: Negative.    Allergic/Immunologic: Negative.    Neurological: Negative.    Hematological: Negative.    Psychiatric/Behavioral: Negative.             Objective:        Tobacco Cessation  "Counseling: Tobacco cessation counseling was provided. The patient is sincerely urged to quit consumption of tobacco. She is not ready to quit tobacco.             /80   Pulse 95   Temp (!) 97.4 °F (36.3 °C)   Resp 18   Ht 5' 3\" (1.6 m)   Wt 117 kg (259 lb)   LMP  (LMP Unknown)   SpO2 98%   BMI 45.88 kg/m²          Physical Exam  Vitals and nursing note reviewed.   Constitutional:       General: She is not in acute distress.     Appearance: Normal appearance. She is not ill-appearing, toxic-appearing or diaphoretic.   HENT:      Head: Normocephalic and atraumatic.      Right Ear: Tympanic membrane, ear canal and external ear normal. There is no impacted cerumen.      Left Ear: Tympanic membrane, ear canal and external ear normal. There is no impacted cerumen.      Nose: Nose normal. No congestion or rhinorrhea.      Mouth/Throat:      Mouth: Mucous membranes are moist.      Pharynx: No oropharyngeal exudate or posterior oropharyngeal erythema.      Comments: Scabbing noted left naris greater than right  Eyes:      General: No scleral icterus.        Right eye: No discharge.         Left eye: No discharge.   Neck:      Vascular: No carotid bruit.   Cardiovascular:      Rate and Rhythm: Normal rate and regular rhythm.      Pulses: Normal pulses.      Heart sounds: Normal heart sounds. No murmur heard.     No friction rub. No gallop.   Pulmonary:      Effort: Pulmonary effort is normal. No respiratory distress.      Breath sounds: Normal breath sounds. No stridor. No wheezing, rhonchi or rales.   Chest:      Chest wall: No tenderness.   Musculoskeletal:         General: No swelling, tenderness, deformity or signs of injury. Normal range of motion.      Cervical back: Normal range of motion and neck supple. No rigidity. No muscular tenderness.      Right lower leg: No edema.      Left lower leg: No edema.   Lymphadenopathy:      Cervical: No cervical adenopathy.   Skin:     General: Skin is warm and dry. "      Capillary Refill: Capillary refill takes less than 2 seconds.      Coloration: Skin is not jaundiced.      Findings: No bruising, erythema, lesion or rash.   Neurological:      Mental Status: She is alert and oriented to person, place, and time.      Cranial Nerves: No cranial nerve deficit.      Sensory: No sensory deficit.      Motor: No weakness.      Coordination: Coordination normal.      Gait: Gait normal.   Psychiatric:         Mood and Affect: Mood normal.         Behavior: Behavior normal.         Thought Content: Thought content normal.         Judgment: Judgment normal.

## 2024-01-17 NOTE — TELEPHONE ENCOUNTER
Pharmacy called the Rx line. They do not have Vitamin B-12 sublingual tablets and can not get them. They have regular tableys and want to know if that is okay to switch? Please call to confirm.

## 2024-01-17 NOTE — TELEPHONE ENCOUNTER
PT CALLED INTO THE OFFICE STATING THAT THE PHARMACY DOES NOT HAVE THE VITAMIN B12 SUBLINGUAL TABLETS THEY HAVE THE REGULAR TABLETS AND WANT TO KNOW IF THAT IS OKAY FOR THE PT TO SWITCH TO.

## 2024-01-26 ENCOUNTER — TELEMEDICINE (OUTPATIENT)
Dept: BEHAVIORAL/MENTAL HEALTH CLINIC | Facility: CLINIC | Age: 34
End: 2024-01-26
Payer: COMMERCIAL

## 2024-01-26 DIAGNOSIS — F41.1 GENERALIZED ANXIETY DISORDER: Chronic | ICD-10-CM

## 2024-01-26 DIAGNOSIS — F33.1 DEPRESSION, MAJOR, RECURRENT, MODERATE (HCC): Primary | Chronic | ICD-10-CM

## 2024-01-26 PROCEDURE — 90837 PSYTX W PT 60 MINUTES: CPT | Performed by: SOCIAL WORKER
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
## 2024-01-28 ENCOUNTER — HOSPITAL ENCOUNTER (EMERGENCY)
Facility: HOSPITAL | Age: 34
Discharge: HOME | End: 2024-01-28
Attending: FAMILY MEDICINE
Payer: COMMERCIAL

## 2024-01-28 VITALS
DIASTOLIC BLOOD PRESSURE: 79 MMHG | BODY MASS INDEX: 45.89 KG/M2 | HEIGHT: 63 IN | WEIGHT: 259 LBS | SYSTOLIC BLOOD PRESSURE: 182 MMHG | TEMPERATURE: 99.5 F | HEART RATE: 104 BPM | OXYGEN SATURATION: 97 % | RESPIRATION RATE: 18 BRPM

## 2024-01-28 DIAGNOSIS — U07.1 COVID: Primary | ICD-10-CM

## 2024-01-28 DIAGNOSIS — R50.9 FEVER: ICD-10-CM

## 2024-01-28 PROBLEM — A09 DIARRHEA OF INFECTIOUS ORIGIN: Status: RESOLVED | Noted: 2023-11-29 | Resolved: 2024-01-28

## 2024-01-28 LAB
FLUAV RNA RESP QL NAA+PROBE: NEGATIVE
FLUBV RNA RESP QL NAA+PROBE: NEGATIVE
RSV RNA RESP QL NAA+PROBE: NEGATIVE
SARS-COV-2 RNA RESP QL NAA+PROBE: POSITIVE

## 2024-01-28 PROCEDURE — 99283 EMERGENCY DEPT VISIT LOW MDM: CPT

## 2024-01-28 PROCEDURE — 99284 EMERGENCY DEPT VISIT MOD MDM: CPT | Performed by: FAMILY MEDICINE

## 2024-01-28 PROCEDURE — 96372 THER/PROPH/DIAG INJ SC/IM: CPT

## 2024-01-28 PROCEDURE — 0241U HB NFCT DS VIR RESP RNA 4 TRGT: CPT

## 2024-01-28 RX ORDER — ACETAMINOPHEN 325 MG/1
975 TABLET ORAL ONCE
Status: COMPLETED | OUTPATIENT
Start: 2024-01-28 | End: 2024-01-28

## 2024-01-28 RX ORDER — KETOROLAC TROMETHAMINE 30 MG/ML
60 INJECTION, SOLUTION INTRAMUSCULAR; INTRAVENOUS ONCE
Status: COMPLETED | OUTPATIENT
Start: 2024-01-28 | End: 2024-01-28

## 2024-01-28 RX ORDER — NIRMATRELVIR AND RITONAVIR 150-100 MG
2 KIT ORAL 2 TIMES DAILY
Qty: 20 TABLET | Refills: 0 | Status: SHIPPED | OUTPATIENT
Start: 2024-01-28 | End: 2024-02-02

## 2024-01-28 RX ADMIN — KETOROLAC TROMETHAMINE 60 MG: 30 INJECTION, SOLUTION INTRAMUSCULAR; INTRAVENOUS at 12:31

## 2024-01-28 RX ADMIN — ACETAMINOPHEN 975 MG: 325 TABLET ORAL at 12:29

## 2024-01-28 NOTE — ED PROVIDER NOTES
History  Chief Complaint   Patient presents with    COVID-19 Exposure     Pt reports positive today for covid plus migraine     HPI  This is 33-year-old female presented with complaint of a generalized body ache fever cough and headache.  Patient states that her symptoms started last night worse today.  Patient states that she did COVID test at home which was positive.  Patient states that she just wanted to get checked.  Denies any chest pain shortness of breath.  Patient is nontoxic-appearing awake alert oriented x 3 GCS 15.  Prior to Admission Medications   Prescriptions Last Dose Informant Patient Reported? Taking?   ALPRAZolam (XANAX) 0.5 mg tablet   No No   Sig: Take 1 tablet (0.5 mg total) by mouth 2 (two) times a day as needed for anxiety   Aimovig 140 MG/ML SOAJ   No No   Sig: Inject 140mg (1 pen) under the skin every 30 days.   Ascorbic Acid 500 MG CAPS   Yes No   Sig: Take 500 mg by mouth daily   Patient not taking: Reported on 2024   Cholecalciferol (Vitamin D3) 50 MCG (2000 UT) TABS   Yes No   Cholecalciferol (Vitamin D3) 50 MCG (2000 UT) capsule   No No   Sig: Take 1 capsule (2,000 Units total) by mouth daily   Cyanocobalamin (Vitamin B-12) 500 MCG SUBL   No No   Sig: Place 1 tablet (500 mcg total) under the tongue in the morning   FeroSul 325 (65 Fe) MG tablet   No No   Sig: Take 1 tablet (325 mg total) by mouth in the morning   OLANZapine (ZyPREXA) 2.5 mg tablet   No No   Sig: Take 1 tablet (2.5 mg total) by mouth daily Do not take with reglan.   Riboflavin 400 MG CAPS   No No   Sig: One tab daily   Patient not taking: Reported on 2024   Trudhesa 0.725 MG/ACT AERS   No No   Si spray in each nostril for total dose of 1.45mg, may repeat 1 dose after 1 hour. Max 2 doses per 24 hours and 3 doses per week.   ascorbic acid (VITAMIN C) 500 MG tablet   Yes No   Sig: Take 500 mg by mouth daily   buPROPion (WELLBUTRIN XL) 300 mg 24 hr tablet   No No   Sig: Take 1 tablet (300 mg total) by mouth  daily   cyanocobalamin 1,000 mcg/mL   No No   Si IM injection every month   dicyclomine (BENTYL) 20 mg tablet   No No   Sig: Take 1 tablet (20 mg total) by mouth 2 (two) times a day   diphenoxylate-atropine (LOMOTIL) 2.5-0.025 mg per tablet   No No   Sig: Take 1 tablet by mouth 4 (four) times a day as needed for diarrhea   Patient not taking: Reported on 2024   doxycycline hyclate (VIBRAMYCIN) 100 mg capsule   No No   Sig: Take 1 capsule (100 mg total) by mouth every 12 (twelve) hours for 10 days   escitalopram (LEXAPRO) 20 mg tablet   No No   Sig: Take 1 tablet (20 mg total) by mouth daily   esomeprazole (NexIUM) 40 MG capsule   Yes No   Sig: Take 40 mg by mouth   etonogestrel-ethinyl estradiol (NuvaRing) 0.12-0.015 MG/24HR vaginal ring   No No   Sig: Insert ring for 21 days then remove for one week.   gabapentin (Neurontin) 300 mg capsule   No No   Sig: Take 1 capsule (300 mg total) by mouth 3 (three) times a day   inFLIXimab-axxq (Avsola) 100 mg SOLR   Yes No   Sig: Inject 5 mg/kg into a catheter in a vein   indomethacin (INDOCIN) 25 mg capsule   No No   Si-2 tabs BID prn severe headache with food. Hold toradol.   loperamide (IMODIUM) 2 mg capsule   No No   Sig: Take 1 capsule (2 mg total) by mouth 4 (four) times a day as needed for diarrhea   Patient not taking: Reported on 2024   magnesium Oxide (MAG-OX) 400 mg TABS   No No   Sig: Take 1 tablet (400 mg total) by mouth daily   methocarbamol (ROBAXIN) 500 mg tablet   Yes No   mexiletine (MEXITIL) 150 mg capsule   No No   Sig: Once daily x 1 week, then BID   Patient not taking: Reported on 2024   mupirocin (BACTROBAN) 2 % ointment   No No   Sig: Apply topically 2 (two) times a day   onabotulinumtoxin A (BOTOX) 100 units  Self Yes No   Sig: Inject as directed   ondansetron (ZOFRAN) 4 mg tablet   Yes No   ondansetron (ZOFRAN) 4 mg tablet   No No   Sig: Take 1 tablet (4 mg total) by mouth every 6 (six) hours   ondansetron (ZOFRAN-ODT) 4 mg  disintegrating tablet   No No   Sig: Take 1 tablet (4 mg total) by mouth every 6 (six) hours as needed for nausea or vomiting   pantoprazole (PROTONIX) 40 mg tablet   No No   Sig: Take 1 tablet (40 mg total) by mouth daily   Patient not taking: Reported on 1/17/2024   pramipexole (MIRAPEX) 0.25 mg tablet   No No   Sig: Take 1 tablet (0.25 mg total) by mouth daily at bedtime   prazosin (MINIPRESS) 2 mg capsule   No No   Sig: Take 1 capsule (2 mg total) by mouth daily at bedtime   predniSONE 5 mg tablet   Yes No   Patient not taking: Reported on 1/17/2024      Facility-Administered Medications Last Administration Doses Remaining   cyanocobalamin injection 1,000 mcg 8/3/2020  8:59 AM    cyanocobalamin injection 1,000 mcg 5/18/2020 11:46 AM    cyanocobalamin injection 1,000 mcg 1/17/2024  2:07 PM    cyanocobalamin injection 1,000 mcg 1/17/2024  3:48 PM           Past Medical History:   Diagnosis Date    Abnormal Pap smear of cervix     Allergic     Anxiety     Arthritis     Depression     Fibromyalgia, primary     GERD (gastroesophageal reflux disease)     Hypertension     IBS (irritable bowel syndrome)     Migraine     Obesity     Vitamin B12 deficiency        Past Surgical History:   Procedure Laterality Date    COLONOSCOPY N/A 5/8/2018    Procedure: COLONOSCOPY;  Surgeon: Stephanie Alston MD;  Location: [facility];  Service: Gastroenterology    NC EXC B9 LESION MRGN XCP SK TG S/N/H/F/G > 4.0CM N/A 7/1/2021    Procedure: EXCISION OF PILAR SCALP CYST X 2 AND RIGHT INNER GROIN NEVVUS;  Surgeon: Denis Barron MD;  Location:  MAIN OR;  Service: Plastics    NC REPAIR COMPLEX SCALP/ARM/LEG 2.6-7.5 CM N/A 7/1/2021    Procedure: CLOSURE WOUND SCALP X 2 AND RIGHT INNER GROIN;  Surgeon: Denis Barron MD;  Location:  MAIN OR;  Service: Plastics    TONSILLECTOMY      WISDOM TOOTH EXTRACTION         Family History   Problem Relation Age of Onset    Rheum arthritis Mother     Psoriasis Mother     Other Mother      Hypertension Mother     Diabetes unspecified Mother     Sjogren's syndrome Mother     Alcohol abuse Mother     Drug abuse Mother     Anxiety disorder Father     Alcohol abuse Father     Lung cancer Maternal Grandfather     Cancer Other         bone    Diabetes Other     Other Other         High blood pressure    Depression Maternal Grandmother      I have reviewed and agree with the history as documented.    E-Cigarette/Vaping    E-Cigarette Use Current Some Day User     Start Date 7/1/19     Comments medical marijuana      E-Cigarette/Vaping Substances    Nicotine No     THC Yes     CBD Yes     Flavoring No     Other No     Unknown No      Social History     Tobacco Use    Smoking status: Never     Passive exposure: Past    Smokeless tobacco: Never   Vaping Use    Vaping status: Some Days    Start date: 7/1/2019    Substances: THC, CBD   Substance Use Topics    Alcohol use: Not Currently     Comment: rarely    Drug use: Yes     Frequency: 2.0 times per week     Types: Marijuana     Comment: medical marijuana (vape)       Review of Systems   Constitutional:  Negative for chills and fever.   HENT:  Positive for congestion. Negative for rhinorrhea and sore throat.    Eyes:  Negative for visual disturbance.   Respiratory:  Negative for cough and shortness of breath.    Cardiovascular:  Negative for chest pain and leg swelling.   Gastrointestinal:  Negative for abdominal pain, diarrhea, nausea and vomiting.   Genitourinary:  Negative for dysuria.   Musculoskeletal:  Negative for back pain and myalgias.   Skin:  Negative for rash.   Neurological:  Positive for headaches. Negative for dizziness.   Psychiatric/Behavioral:  Negative for confusion.    All other systems reviewed and are negative.      Physical Exam  Physical Exam  Vitals and nursing note reviewed.   Constitutional:       Appearance: She is well-developed.   HENT:      Head: Normocephalic and atraumatic.      Right Ear: External ear normal.      Left Ear:  External ear normal.      Nose: Nose normal.      Mouth/Throat:      Mouth: Mucous membranes are moist.      Pharynx: No oropharyngeal exudate.   Eyes:      General: No scleral icterus.        Right eye: No discharge.         Left eye: No discharge.      Conjunctiva/sclera: Conjunctivae normal.      Pupils: Pupils are equal, round, and reactive to light.   Cardiovascular:      Rate and Rhythm: Regular rhythm. Tachycardia present.      Pulses: Normal pulses.      Heart sounds: Normal heart sounds.   Pulmonary:      Effort: Pulmonary effort is normal. No respiratory distress.      Breath sounds: Normal breath sounds. No wheezing.   Abdominal:      General: Bowel sounds are normal.      Palpations: Abdomen is soft.   Musculoskeletal:         General: Normal range of motion.      Cervical back: Normal range of motion and neck supple.   Lymphadenopathy:      Cervical: No cervical adenopathy.   Skin:     General: Skin is warm and dry.      Capillary Refill: Capillary refill takes less than 2 seconds.   Neurological:      General: No focal deficit present.      Mental Status: She is alert and oriented to person, place, and time.   Psychiatric:         Mood and Affect: Mood normal.         Behavior: Behavior normal.         Vital Signs  ED Triage Vitals   Temperature Pulse Respirations Blood Pressure SpO2   01/28/24 1155 01/28/24 1155 01/28/24 1155 01/28/24 1157 01/28/24 1155   (!) 100.8 °F (38.2 °C) (!) 120 18 (!) 168/102 98 %      Temp Source Heart Rate Source Patient Position - Orthostatic VS BP Location FiO2 (%)   01/28/24 1155 01/28/24 1155 01/28/24 1159 01/28/24 1159 --   Temporal Monitor Sitting Left arm       Pain Score       01/28/24 1155       7           Vitals:    01/28/24 1155 01/28/24 1157 01/28/24 1159 01/28/24 1215   BP:  (!) 168/102 158/92 (!) 182/79   Pulse: (!) 120   104   Patient Position - Orthostatic VS:   Sitting          Visual Acuity      ED Medications  Medications   ketorolac (TORADOL) injection  60 mg (60 mg Intramuscular Given 1/28/24 1231)   acetaminophen (TYLENOL) tablet 975 mg (975 mg Oral Given 1/28/24 1229)       Diagnostic Studies  Results Reviewed       Procedure Component Value Units Date/Time    FLU/RSV/COVID - if FLU/RSV clinically relevant [[id number]]  (Abnormal) Collected: 01/28/24 1204    Lab Status: Final result Specimen: Nares from Nose Updated: 01/28/24 1247     SARS-CoV-2 Positive     INFLUENZA A PCR Negative     INFLUENZA B PCR Negative     RSV PCR Negative    Narrative:      FOR PEDIATRIC PATIENTS - copy/paste COVID Guidelines URL to browser: [email or web address]    SARS-CoV-2 assay is a Nucleic Acid Amplification assay intended for the  qualitative detection of nucleic acid from SARS-CoV-2 in nasopharyngeal  swabs. Results are for the presumptive identification of SARS-CoV-2 RNA.    Positive results are indicative of infection with SARS-CoV-2, the virus  causing COVID-19, but do not rule out bacterial infection or co-infection  with other viruses. Laboratories within the [place] and its  territories are required to report all positive results to the appropriate  public health authorities. Negative results do not preclude SARS-CoV-2  infection and should not be used as the sole basis for treatment or other  patient management decisions. Negative results must be combined with  clinical observations, patient history, and epidemiological information.  This test has not been FDA cleared or approved.    This test has been authorized by FDA under an Emergency Use Authorization  (EUA). This test is only authorized for the duration of time the  declaration that circumstances exist justifying the authorization of the  emergency use of an in vitro diagnostic tests for detection of SARS-CoV-2  virus and/or diagnosis of COVID-19 infection under section 564(b)(1) of  the Act, 21 U.S.C. 360bbb-3(b)(1), unless the authorization is  terminated  or revoked sooner. The test has been validated but independent review by FDA  and CLIA is pending.    Test performed using [employer] GeneXpert: This RT-PCR assay targets N2,  a region unique to SARS-CoV-2. A conserved region in the E-gene was chosen  for pan-Sarbecovirus detection which includes SARS-CoV-2.    According to CMS-2020-01-R, this platform meets the definition of high-throughput technology.                   No orders to display              Procedures  Procedures         ED Course                               SBIRT 20yo+      Flowsheet Row Most Recent Value   Initial Alcohol Screen: US AUDIT-C     1. How often do you have a drink containing alcohol? 0 Filed at: 01/28/2024 1200   2. How many drinks containing alcohol do you have on a typical day you are drinking?  0 Filed at: 01/28/2024 1200   3b. FEMALE Any Age, or MALE 65+: How often do you have 4 or more drinks on one occassion? 0 Filed at: 01/28/2024 1200   Audit-C Score 0 Filed at: 01/28/2024 1200   BRUCE: How many times in the past year have you...    Used an illegal drug or used a prescription medication for non-medical reasons? Never Filed at: 01/28/2024 1200                      Medical Decision Making  Patient with history as above presented with flu like symptoms. History obtained from patient.  Patient is a stable awake alert orientated slightly tachycardic which did improve.  Patient states that she just wanted to get checked.    Patient was nontoxic, stable. Ambulatory. Exam reassuring against focal bacterial infection or surgical pathology.   Labs reviewed.     Reviewed external records.     Differential diagnosis considered. Overall presentation is consistent with COVID infection. Low suspicion for bacterial sinusitis, pneumonia, meningitis, endocarditis, or other serious infection. Patient has normal oxygen levels and is nontoxic.     Patient was treated with Toradol and Tylenol with improvement in symptoms.  Patient is started  on Paxlovid however I did tell patient to speak with her rheumatologist before starting this medication as if there is any contraindication with her medication.  Patient verbalized understand the plan recommending supportive treatment including hydration Tylenol ibuprofen vitamin C vitamin D.    Consideration was given for admission, but the patient was stable for outpatient management.     Disposition: Discussed need to follow up diagnostics, including incidental findings. Discharged with instructions to obtain outpatient follow up of patient's symptoms and findings, with strict return precautions if patient develops new or worsening symptoms.     Risk  OTC drugs.  Prescription drug management.             Disposition  Final diagnoses:   COVID   Fever     Time reflects when diagnosis was documented in both MDM as applicable and the Disposition within this note       Time User Action Codes Description Comment    1/28/2024 12:28 PM Michael Santiago Add [U07.1] COVID     1/28/2024 12:43 PM Jack Michael Add [R50.9] Fever           ED Disposition       ED Disposition   Discharge    Condition   Stable    Date/Time   Sun Jan 28, 2024 1228    Comment   Cruz Schneider discharge to home/self care.                   Follow-up Information       Follow up With Specialties Details Why Contact Info    Oni Beckham DO Family Medicine Schedule an appointment as soon as possible for a visit in 2 days If symptoms worsen [address] [place] [address]  [phone]              Patient's Medications   Discharge Prescriptions    NIRMATRELVIR & RITONAVIR (PAXLOVID, 150/100,) TABLET THERAPY PACK    Take 2 tablets by mouth 2 (two) times a day for 5 days Take 1 nirmatrelvir tablet + 1 ritonavir tablet together per dose       Start Date: 1/28/2024 End Date: 2/2/2024       Order Dose: 2 tablets       Quantity: 20 tablet    Refills: 0       No discharge procedures on file.    PDMP Review         Value Time User    PDMP Reviewed  Yes  1/8/2024  9:40 AM Aliyah Keating MD            ED Provider  Electronically Signed by             Michael Santiago MD  01/28/24 2945

## 2024-01-29 ENCOUNTER — TELEPHONE (OUTPATIENT)
Dept: NEUROLOGY | Facility: CLINIC | Age: 34
End: 2024-01-29

## 2024-02-01 ENCOUNTER — OFFICE VISIT (OUTPATIENT)
Dept: URGENT CARE | Facility: CLINIC | Age: 34
End: 2024-02-01
Payer: COMMERCIAL

## 2024-02-01 VITALS
TEMPERATURE: 98.2 F | BODY MASS INDEX: 45.54 KG/M2 | OXYGEN SATURATION: 98 % | RESPIRATION RATE: 18 BRPM | HEART RATE: 89 BPM | WEIGHT: 257 LBS | HEIGHT: 63 IN | DIASTOLIC BLOOD PRESSURE: 84 MMHG | SYSTOLIC BLOOD PRESSURE: 136 MMHG

## 2024-02-01 DIAGNOSIS — B37.0 THRUSH: Primary | ICD-10-CM

## 2024-02-01 DIAGNOSIS — J02.9 SORE THROAT: ICD-10-CM

## 2024-02-01 LAB — S PYO AG THROAT QL: NEGATIVE

## 2024-02-01 PROCEDURE — 99213 OFFICE O/P EST LOW 20 MIN: CPT | Performed by: NURSE PRACTITIONER

## 2024-02-01 PROCEDURE — 87880 STREP A ASSAY W/OPTIC: CPT | Performed by: NURSE PRACTITIONER

## 2024-02-01 PROCEDURE — 87070 CULTURE OTHR SPECIMN AEROBIC: CPT | Performed by: NURSE PRACTITIONER

## 2024-02-01 NOTE — TELEPHONE ENCOUNTER
I was seen at urgent today. They said I have thrush and gave me Nystain liquid. I'm having a really hard time with it. Is it possible to send over the Diflucan pill?

## 2024-02-01 NOTE — PROGRESS NOTES
"  [facility]        NAME: Cruz Schneider is a 33 y.o. female  : 1990    MRN: [id number]  DATE: 2024  TIME: 11:52 AM    Assessment and Plan   Thrush [B37.0]  1. Thrush  nystatin (MYCOSTATIN) 500,000 units/5 mL suspension      2. Sore throat  POCT rapid strepA    Throat culture          Rapid strep negative, will send culture.   Patient Instructions     Patient Instructions   Use nystatin as directed. Drink plenty of fluids, rest, saltwater gargles, lozenges, hot tea with honey. Tylenol or motrin for pain.  If you develop a prolonged high fever, difficulty breathing, swallowing, managing secretions, decreased fluid intake, or urination, any new or concerning symptoms please return or proceed ER.  Recommend following up with PCP in 3-5 days.      Chief Complaint     Chief Complaint   Patient presents with    Sore Throat     Started yesterday with sore throat. Tested pos for Covid on .           History of Present Illness       Sore Throat   This is a new problem. The current episode started yesterday. The problem has been unchanged. Neither side of throat is experiencing more pain than the other. There has been no fever. The pain is moderate. Associated symptoms include congestion and coughing. Pertinent negatives include no abdominal pain, diarrhea, drooling, ear discharge, ear pain, headaches, hoarse voice, neck pain, shortness of breath, stridor, swollen glands, trouble swallowing or vomiting. She has had no exposure to strep or mono. She has tried acetaminophen for the symptoms. The treatment provided mild relief.     Patient did test positive for covid 4 days ago.patient also notes that she took a course of doxycyline last week for \"scabs in my nose that my doctor thought needed antibiotics.\"    Review of Systems   Review of Systems   Constitutional:  Negative for chills, diaphoresis, fatigue and fever.   HENT:  Positive for congestion and sore throat. Negative for drooling, " ear discharge, ear pain, facial swelling, hoarse voice, mouth sores, postnasal drip, rhinorrhea, sinus pressure, sinus pain and trouble swallowing.    Eyes:  Negative for photophobia and visual disturbance.   Respiratory:  Positive for cough. Negative for chest tightness, shortness of breath and stridor.    Cardiovascular:  Negative for chest pain and palpitations.   Gastrointestinal:  Negative for abdominal pain, diarrhea, nausea and vomiting.   Genitourinary: Negative.    Musculoskeletal:  Positive for myalgias. Negative for arthralgias, back pain, joint swelling, neck pain and neck stiffness.   Skin:  Negative for rash.   Neurological:  Negative for dizziness, facial asymmetry, weakness, light-headedness, numbness and headaches.         Current Medications       Current Outpatient Medications:     Aimovig 140 MG/ML SOAJ, Inject 140mg (1 pen) under the skin every 30 days., Disp: 1 mL, Rfl: 11    ALPRAZolam (XANAX) 0.5 mg tablet, Take 1 tablet (0.5 mg total) by mouth 2 (two) times a day as needed for anxiety, Disp: 60 tablet, Rfl: 2    ascorbic acid (VITAMIN C) 500 MG tablet, Take 500 mg by mouth daily, Disp: , Rfl:     buPROPion (WELLBUTRIN XL) 300 mg 24 hr tablet, Take 1 tablet (300 mg total) by mouth daily, Disp: 90 tablet, Rfl: 0    Cholecalciferol (Vitamin D3) 50 MCG (2000 UT) capsule, Take 1 capsule (2,000 Units total) by mouth daily, Disp: 90 capsule, Rfl: 1    Cholecalciferol (Vitamin D3) 50 MCG (2000 UT) TABS, , Disp: , Rfl:     Cyanocobalamin (Vitamin B-12) 500 MCG SUBL, Place 1 tablet (500 mcg total) under the tongue in the morning, Disp: 30 tablet, Rfl: 2    cyanocobalamin 1,000 mcg/mL, 1 IM injection every month, Disp: 3 mL, Rfl: 3    escitalopram (LEXAPRO) 20 mg tablet, Take 1 tablet (20 mg total) by mouth daily, Disp: 90 tablet, Rfl: 0    esomeprazole (NexIUM) 40 MG capsule, Take 40 mg by mouth, Disp: , Rfl:     etonogestrel-ethinyl estradiol (NuvaRing) 0.12-0.015 MG/24HR vaginal ring, Insert ring  for 21 days then remove for one week., Disp: 3 each, Rfl: 4    FeroSul 325 (65 Fe) MG tablet, Take 1 tablet (325 mg total) by mouth in the morning, Disp: 90 tablet, Rfl: 1    gabapentin (Neurontin) 300 mg capsule, Take 1 capsule (300 mg total) by mouth 3 (three) times a day, Disp: 90 capsule, Rfl: 5    indomethacin (INDOCIN) 25 mg capsule, 1-2 tabs BID prn severe headache with food. Hold toradol., Disp: 30 capsule, Rfl: 6    inFLIXimab-axxq (Avsola) 100 mg SOLR, Inject 5 mg/kg into a catheter in a vein, Disp: , Rfl:     magnesium Oxide (MAG-OX) 400 mg TABS, Take 1 tablet (400 mg total) by mouth daily, Disp: 30 tablet, Rfl: 5    methocarbamol (ROBAXIN) 500 mg tablet, , Disp: , Rfl:     mexiletine (MEXITIL) 150 mg capsule, Once daily x 1 week, then BID, Disp: 180 capsule, Rfl: 0    mupirocin (BACTROBAN) 2 % ointment, Apply topically 2 (two) times a day, Disp: 15 g, Rfl: 0    nirmatrelvir & ritonavir (Paxlovid, 150/100,) tablet therapy pack, Take 2 tablets by mouth 2 (two) times a day for 5 days Take 1 nirmatrelvir tablet + 1 ritonavir tablet together per dose, Disp: 20 tablet, Rfl: 0    nystatin (MYCOSTATIN) 500,000 units/5 mL suspension, Apply 5 mL (500,000 Units total) to the mouth or throat 4 (four) times a day, Disp: 473 mL, Rfl: 0    OLANZapine (ZyPREXA) 2.5 mg tablet, Take 1 tablet (2.5 mg total) by mouth daily Do not take with reglan., Disp: 90 tablet, Rfl: 0    onabotulinumtoxin A (BOTOX) 100 units, Inject as directed, Disp: , Rfl:     ondansetron (ZOFRAN) 4 mg tablet, , Disp: , Rfl:     pantoprazole (PROTONIX) 40 mg tablet, Take 1 tablet (40 mg total) by mouth daily, Disp: 30 tablet, Rfl: 3    pramipexole (MIRAPEX) 0.25 mg tablet, Take 1 tablet (0.25 mg total) by mouth daily at bedtime, Disp: 34 tablet, Rfl: 3    prazosin (MINIPRESS) 2 mg capsule, Take 1 capsule (2 mg total) by mouth daily at bedtime, Disp: 90 capsule, Rfl: 0    Riboflavin 400 MG CAPS, One tab daily, Disp: 30 capsule, Rfl: 5    Trudhesa 0.725  MG/ACT AERS, 1 spray in each nostril for total dose of 1.45mg, may repeat 1 dose after 1 hour. Max 2 doses per 24 hours and 3 doses per week., Disp: 12 mL, Rfl: 6    Ascorbic Acid 500 MG CAPS, Take 500 mg by mouth daily (Patient not taking: Reported on 1/17/2024), Disp: , Rfl:     dicyclomine (BENTYL) 20 mg tablet, Take 1 tablet (20 mg total) by mouth 2 (two) times a day (Patient not taking: Reported on 2/1/2024), Disp: 20 tablet, Rfl: 0    diphenoxylate-atropine (LOMOTIL) 2.5-0.025 mg per tablet, Take 1 tablet by mouth 4 (four) times a day as needed for diarrhea (Patient not taking: Reported on 1/17/2024), Disp: 10 tablet, Rfl: 0    loperamide (IMODIUM) 2 mg capsule, Take 1 capsule (2 mg total) by mouth 4 (four) times a day as needed for diarrhea (Patient not taking: Reported on 1/17/2024), Disp: 12 capsule, Rfl: 0    ondansetron (ZOFRAN) 4 mg tablet, Take 1 tablet (4 mg total) by mouth every 6 (six) hours (Patient not taking: Reported on 2/1/2024), Disp: 30 tablet, Rfl: 2    ondansetron (ZOFRAN-ODT) 4 mg disintegrating tablet, Take 1 tablet (4 mg total) by mouth every 6 (six) hours as needed for nausea or vomiting (Patient not taking: Reported on 2/1/2024), Disp: 90 tablet, Rfl: 0    predniSONE 5 mg tablet, , Disp: , Rfl:     Current Facility-Administered Medications:     cyanocobalamin injection 1,000 mcg, 1,000 mcg, Intramuscular, Q30 Days, Oni Beckham DO, 1,000 mcg at 08/03/20 0859    cyanocobalamin injection 1,000 mcg, 1,000 mcg, Intramuscular, Q14 Days, Oni Beckham DO, 1,000 mcg at 05/18/20 1146    cyanocobalamin injection 1,000 mcg, 1,000 mcg, Intramuscular, Q30 Days, Oni Beckham DO, 1,000 mcg at 01/17/24 1407    cyanocobalamin injection 1,000 mcg, 1,000 mcg, Intramuscular, Q30 Days, Oni Beckham DO, 1,000 mcg at 01/17/24 1548    Current Allergies     Allergies as of 02/01/2024 - Reviewed 02/01/2024   Allergen Reaction Noted    Cimzia [certolizumab pegol] Swelling 03/22/2022    Desvenlafaxine   11/26/2012    Ixekizumab Vomiting 05/27/2022    Seasonal ic [octacosanol] Nasal Congestion 11/14/2023    Valproic acid Other (See Comments) 10/18/2017    Voltaren [diclofenac] Swelling 12/13/2022    Tizanidine Anxiety 01/20/2021            The following portions of the patient's history were reviewed and updated as appropriate: allergies, current medications, past family history, past medical history, past social history, past surgical history and problem list.     Past Medical History:   Diagnosis Date    Abnormal Pap smear of cervix     Allergic     Anxiety     Arthritis     Depression     Fibromyalgia, primary     GERD (gastroesophageal reflux disease)     Hypertension     IBS (irritable bowel syndrome)     Migraine     Obesity     Vitamin B12 deficiency        Past Surgical History:   Procedure Laterality Date    COLONOSCOPY N/A 5/8/2018    Procedure: COLONOSCOPY;  Surgeon: Stephanie Alston MD;  Location: [facility];  Service: Gastroenterology    SD EXC B9 LESION MRGN XCP SK TG S/N/H/F/G > 4.0CM N/A 7/1/2021    Procedure: EXCISION OF PILAR SCALP CYST X 2 AND RIGHT INNER GROIN NEVVUS;  Surgeon: Denis Barron MD;  Location:  MAIN OR;  Service: Plastics    SD REPAIR COMPLEX SCALP/ARM/LEG 2.6-7.5 CM N/A 7/1/2021    Procedure: CLOSURE WOUND SCALP X 2 AND RIGHT INNER GROIN;  Surgeon: Denis Barron MD;  Location:  MAIN OR;  Service: Plastics    TONSILLECTOMY      WISDOM TOOTH EXTRACTION         Family History   Problem Relation Age of Onset    Rheum arthritis Mother     Psoriasis Mother     Other Mother     Hypertension Mother     Diabetes unspecified Mother     Sjogren's syndrome Mother     Alcohol abuse Mother     Drug abuse Mother     Anxiety disorder Father     Alcohol abuse Father     Lung cancer Maternal Grandfather     Cancer Other         bone    Diabetes Other     Other Other         High blood pressure    Depression Maternal Grandmother          Medications have been verified.        Objective   BP  "136/84   Pulse 89   Temp 98.2 °F (36.8 °C)   Resp 18   Ht 5' 3\" (1.6 m)   Wt 117 kg (257 lb)   LMP  (LMP Unknown)   SpO2 98%   BMI 45.53 kg/m²   No LMP recorded (lmp unknown). Patient has had an implant.       Physical Exam     Physical Exam  Constitutional:       General: She is not in acute distress.     Appearance: She is well-developed.   HENT:      Head: Normocephalic and atraumatic.      Right Ear: Hearing, tympanic membrane, ear canal and external ear normal.      Left Ear: Hearing, tympanic membrane, ear canal and external ear normal.      Nose: Congestion present. No rhinorrhea.      Right Sinus: No maxillary sinus tenderness or frontal sinus tenderness.      Left Sinus: No maxillary sinus tenderness or frontal sinus tenderness.      Mouth/Throat:      Pharynx: Oropharynx is clear. Uvula midline. Posterior oropharyngeal erythema present. No oropharyngeal exudate.      Tonsils: 0 on the right. 0 on the left.     Eyes:      Conjunctiva/sclera: Conjunctivae normal.      Pupils: Pupils are equal, round, and reactive to light.   Cardiovascular:      Rate and Rhythm: Regular rhythm.      Pulses: Normal pulses.      Heart sounds: Normal heart sounds, S1 normal and S2 normal.   Pulmonary:      Effort: Pulmonary effort is normal.      Breath sounds: Normal breath sounds.   Lymphadenopathy:      Cervical: No cervical adenopathy.   Skin:     General: Skin is warm and dry.      Capillary Refill: Capillary refill takes less than 2 seconds.   Neurological:      Mental Status: She is alert and oriented to person, place, and time.                   "

## 2024-02-01 NOTE — PATIENT INSTRUCTIONS
Use nystatin as directed. Drink plenty of fluids, rest, saltwater gargles, lozenges, hot tea with honey. Tylenol or motrin for pain.  If you develop a prolonged high fever, difficulty breathing, swallowing, managing secretions, decreased fluid intake, or urination, any new or concerning symptoms please return or proceed ER.  Recommend following up with PCP in 3-5 days.

## 2024-02-02 RX ORDER — FLUCONAZOLE 150 MG/1
150 TABLET ORAL DAILY
Qty: 2 TABLET | Refills: 0 | Status: SHIPPED | OUTPATIENT
Start: 2024-02-02 | End: 2024-02-05

## 2024-02-03 DIAGNOSIS — R10.9 ABDOMINAL PAIN: ICD-10-CM

## 2024-02-03 DIAGNOSIS — G25.81 RESTLESS LEG SYNDROME: ICD-10-CM

## 2024-02-03 DIAGNOSIS — A09 DIARRHEA OF INFECTIOUS ORIGIN: ICD-10-CM

## 2024-02-03 LAB — BACTERIA THROAT CULT: NORMAL

## 2024-02-03 RX ORDER — PRAMIPEXOLE DIHYDROCHLORIDE 0.25 MG/1
0.25 TABLET ORAL
Qty: 34 TABLET | Refills: 0 | Status: SHIPPED | OUTPATIENT
Start: 2024-02-03

## 2024-02-03 RX ORDER — DICYCLOMINE HCL 20 MG
20 TABLET ORAL 2 TIMES DAILY
Qty: 20 TABLET | Refills: 0 | Status: SHIPPED | OUTPATIENT
Start: 2024-02-03

## 2024-02-05 RX ORDER — DIPHENOXYLATE HYDROCHLORIDE AND ATROPINE SULFATE 2.5; .025 MG/1; MG/1
1 TABLET ORAL 4 TIMES DAILY PRN
Qty: 10 TABLET | Refills: 0 | Status: SHIPPED | OUTPATIENT
Start: 2024-02-05

## 2024-02-07 ENCOUNTER — PROCEDURE VISIT (OUTPATIENT)
Dept: NEUROLOGY | Facility: CLINIC | Age: 34
End: 2024-02-07
Payer: COMMERCIAL

## 2024-02-07 ENCOUNTER — TELEMEDICINE (OUTPATIENT)
Dept: BEHAVIORAL/MENTAL HEALTH CLINIC | Facility: CLINIC | Age: 34
End: 2024-02-07
Payer: COMMERCIAL

## 2024-02-07 VITALS — HEART RATE: 87 BPM | DIASTOLIC BLOOD PRESSURE: 74 MMHG | TEMPERATURE: 98.3 F | SYSTOLIC BLOOD PRESSURE: 136 MMHG

## 2024-02-07 DIAGNOSIS — F33.1 DEPRESSION, MAJOR, RECURRENT, MODERATE (HCC): Primary | Chronic | ICD-10-CM

## 2024-02-07 DIAGNOSIS — G43.709 CHRONIC MIGRAINE WITHOUT AURA WITHOUT STATUS MIGRAINOSUS, NOT INTRACTABLE: Primary | ICD-10-CM

## 2024-02-07 DIAGNOSIS — F41.1 GENERALIZED ANXIETY DISORDER: Chronic | ICD-10-CM

## 2024-02-07 PROCEDURE — 90837 PSYTX W PT 60 MINUTES: CPT | Performed by: SOCIAL WORKER

## 2024-02-07 PROCEDURE — 64615 CHEMODENERV MUSC MIGRAINE: CPT | Performed by: PHYSICIAN ASSISTANT

## 2024-02-07 NOTE — PROGRESS NOTES
Universal Protocol   Consent: Verbal consent obtained. Written consent obtained.  Risks and benefits: risks, benefits and alternatives were discussed  Consent given by: patient  Patient understanding: patient states understanding of the procedure being performed  Patient consent: the patient's understanding of the procedure matches consent given  Procedure consent: procedure consent matches procedure scheduled      Chemodenervation     Date/Time  2/7/2024 1:00 PM     Performed by  Jacki Perez PA-C   Authorized by  Jacki Perez PA-C     Pre-procedure details      Prepped With: Alcohol     Procedure details      Position:  Upright   Botox      Botox Type:  Type A    Brand:  Botox    mL's of Botulinum Toxin:  200    Final Concentration per CC:  100 units    Needle Gauge:  30 G 2.5 inch   Procedures      Botox Procedures: chronic headache      Indications: migraines     Injection Location      Head / Face:  L superior trapezius, R superior trapezius, L superior cervical paraspinal, R superior cervical paraspinal, L , R , procerus, L temporalis, R temporalis, R frontalis, L frontalis, R medial occipitalis and L medial occipitalis    L  injection amount:  5 unit(s)    R  injection amount:  5 unit(s)    L lateral frontalis:  5 unit(s)    R lateral frontalis:  5 unit(s)    L medial frontalis:  5 unit(s)    R medial frontalis:  5 unit(s)    L temporalis injection amount:  20 unit(s)    R temporalis injection amount:  20 unit(s)    Procerus injection amount:  5 unit(s)    L medial occipitalis injection amount:  15 unit(s)    R medial occipitalis injection amount:  15 unit(s)    L superior cervical paraspinal injection amount:  10 unit(s)    R superior cervical paraspinal injection amount:  10 unit(s)    L superior trapezius injection amount:  0 unit(s)    R superior trapezius injection amount:  0 unit(s)   Total Units      Total units used:  200    Total units discarded:  0    Post-procedure details      Chemodenervation:  Chronic migraine    Facial Nerve Location::  Bilateral facial nerve    Patient tolerance of procedure:  Tolerated well, no immediate complications   Comments       Medically necessary:  - 30 units between the R and L headband region  - 45 units between each anterior temporalis and scalp  Avoided traps b/l.  Cold spray used.       Blood pressure 136/74, pulse 87, temperature 98.3 °F (36.8 °C), temperature source Temporal, unknown if currently breastfeeding.    Pt will consider trial of mexiletine. Did not yet try due to recent IBS flare up.  Will contact me if needing TPIs, as botox wears off and more migraine headaches 1 week prior to next botox.

## 2024-02-07 NOTE — PSYCH
Virtual Regular Visit    Verification of patient location:    Patient is located at Home in the following state in which I hold an active license [place]    Assessment/Plan:    Problem List Items Addressed This Visit          Other    Generalized anxiety disorder (Chronic)    Depression, major, recurrent, moderate (HCC) - Primary (Chronic)     Goals addressed in session: Goal 1      Reason for visit is   Chief Complaint   Patient presents with    Virtual Regular Visit     Encounter provider APARNA Fox    Provider located at [facility]  [facility] [place] [address] [phone]    Recent Visits  No visits were found meeting these conditions.  Showing recent visits within past 7 days and meeting all other requirements  Today's Visits  Date Type Provider Dept   02/07/24 Telemedicine APARNA Fox  [facility] [place]   Showing today's visits and meeting all other requirements  Future Appointments  No visits were found meeting these conditions.  Showing future appointments within next 150 days and meeting all other requirements     The patient was identified by name and date of birth. Cruz Schneider was informed that this is a telemedicine visit and that the visit is being conducted throughthe Epic Embedded platform. She agrees to proceed..  My office door was closed. No one else was in the room.  She acknowledged consent and understanding of privacy and security of the video platform. The patient has agreed to participate and understands they can discontinue the visit at any time.    Patient is aware this is a billable service.     Subjective  Cruz Schneider is a 33 y.o. female.    HPI     Past Medical History:   Diagnosis Date    Abnormal Pap smear of cervix     Allergic     Anxiety     Arthritis     Depression     Fibromyalgia, primary     GERD (gastroesophageal reflux disease)      Hypertension     IBS (irritable bowel syndrome)     Migraine     Obesity     Vitamin B12 deficiency        Past Surgical History:   Procedure Laterality Date    COLONOSCOPY N/A 5/8/2018    Procedure: COLONOSCOPY;  Surgeon: Stephanie Alston MD;  Location: [facility] GI [facility];  Service: Gastroenterology    IA EXC B9 LESION MRGN XCP SK TG S/N/H/F/G > 4.0CM N/A 7/1/2021    Procedure: EXCISION OF PILAR SCALP CYST X 2 AND RIGHT INNER GROIN NEVVUS;  Surgeon: Denis Barron MD;  Location:  MAIN OR;  Service: Plastics    IA REPAIR COMPLEX SCALP/ARM/LEG 2.6-7.5 CM N/A 7/1/2021    Procedure: CLOSURE WOUND SCALP X 2 AND RIGHT INNER GROIN;  Surgeon: Denis Barron MD;  Location:  MAIN OR;  Service: Plastics    TONSILLECTOMY      WISDOM TOOTH EXTRACTION         Current Outpatient Medications   Medication Sig Dispense Refill    Aimovig 140 MG/ML SOAJ Inject 140mg (1 pen) under the skin every 30 days. 1 mL 11    ALPRAZolam (XANAX) 0.5 mg tablet Take 1 tablet (0.5 mg total) by mouth 2 (two) times a day as needed for anxiety 60 tablet 2    ascorbic acid (VITAMIN C) 500 MG tablet Take 500 mg by mouth daily      Ascorbic Acid 500 MG CAPS Take 500 mg by mouth daily (Patient not taking: Reported on 1/17/2024)      buPROPion (WELLBUTRIN XL) 300 mg 24 hr tablet Take 1 tablet (300 mg total) by mouth daily 90 tablet 0    Cholecalciferol (Vitamin D3) 50 MCG (2000 UT) capsule Take 1 capsule (2,000 Units total) by mouth daily 90 capsule 1    Cholecalciferol (Vitamin D3) 50 MCG (2000 UT) TABS       Cyanocobalamin (Vitamin B-12) 500 MCG SUBL Place 1 tablet (500 mcg total) under the tongue in the morning 30 tablet 2    cyanocobalamin 1,000 mcg/mL 1 IM injection every month 3 mL 3    dicyclomine (BENTYL) 20 mg tablet Take 1 tablet (20 mg total) by mouth 2 (two) times a day 20 tablet 0    diphenoxylate-atropine (LOMOTIL) 2.5-0.025 mg per tablet Take 1 tablet by mouth 4 (four) times a day as needed for diarrhea 10 tablet 0    escitalopram (LEXAPRO) 20  mg tablet Take 1 tablet (20 mg total) by mouth daily 90 tablet 0    esomeprazole (NexIUM) 40 MG capsule Take 40 mg by mouth      etonogestrel-ethinyl estradiol (NuvaRing) 0.12-0.015 MG/24HR vaginal ring Insert ring for 21 days then remove for one week. 3 each 4    FeroSul 325 (65 Fe) MG tablet Take 1 tablet (325 mg total) by mouth in the morning 90 tablet 1    gabapentin (Neurontin) 300 mg capsule Take 1 capsule (300 mg total) by mouth 3 (three) times a day 90 capsule 5    indomethacin (INDOCIN) 25 mg capsule 1-2 tabs BID prn severe headache with food. Hold toradol. 30 capsule 6    inFLIXimab-axxq (Avsola) 100 mg SOLR Inject 5 mg/kg into a catheter in a vein      loperamide (IMODIUM) 2 mg capsule Take 1 capsule (2 mg total) by mouth 4 (four) times a day as needed for diarrhea (Patient not taking: Reported on 1/17/2024) 12 capsule 0    magnesium Oxide (MAG-OX) 400 mg TABS Take 1 tablet (400 mg total) by mouth daily 30 tablet 5    methocarbamol (ROBAXIN) 500 mg tablet       mexiletine (MEXITIL) 150 mg capsule Once daily x 1 week, then  capsule 0    mupirocin (BACTROBAN) 2 % ointment Apply topically 2 (two) times a day 15 g 0    nystatin (MYCOSTATIN) 500,000 units/5 mL suspension Apply 5 mL (500,000 Units total) to the mouth or throat 4 (four) times a day 473 mL 0    OLANZapine (ZyPREXA) 2.5 mg tablet Take 1 tablet (2.5 mg total) by mouth daily Do not take with reglan. 90 tablet 0    onabotulinumtoxin A (BOTOX) 100 units Inject as directed      ondansetron (ZOFRAN) 4 mg tablet       ondansetron (ZOFRAN) 4 mg tablet Take 1 tablet (4 mg total) by mouth every 6 (six) hours (Patient not taking: Reported on 2/1/2024) 30 tablet 2    ondansetron (ZOFRAN-ODT) 4 mg disintegrating tablet Take 1 tablet (4 mg total) by mouth every 6 (six) hours as needed for nausea or vomiting (Patient not taking: Reported on 2/1/2024) 90 tablet 0    pantoprazole (PROTONIX) 40 mg tablet Take 1 tablet (40 mg total) by mouth daily 30 tablet 3  "   pramipexole (MIRAPEX) 0.25 mg tablet Take 1 tablet (0.25 mg total) by mouth daily at bedtime 34 tablet 0    prazosin (MINIPRESS) 2 mg capsule Take 1 capsule (2 mg total) by mouth daily at bedtime 90 capsule 0    predniSONE 5 mg tablet  (Patient not taking: Reported on 1/17/2024)      Riboflavin 400 MG CAPS One tab daily 30 capsule 5    Trudhesa 0.725 MG/ACT AERS 1 spray in each nostril for total dose of 1.45mg, may repeat 1 dose after 1 hour. Max 2 doses per 24 hours and 3 doses per week. 12 mL 6     Current Facility-Administered Medications   Medication Dose Route Frequency Provider Last Rate Last Admin    cyanocobalamin injection 1,000 mcg  1,000 mcg Intramuscular Q30 Days Oni Beckham, DO   1,000 mcg at 08/03/20 0859    cyanocobalamin injection 1,000 mcg  1,000 mcg Intramuscular Q14 Days Oni Beckham, DO   1,000 mcg at 05/18/20 1146    cyanocobalamin injection 1,000 mcg  1,000 mcg Intramuscular Q30 Days Oni Beckham, DO   1,000 mcg at 01/17/24 1407    cyanocobalamin injection 1,000 mcg  1,000 mcg Intramuscular Q30 Days Oni Beckham, DO   1,000 mcg at 01/17/24 1548        Allergies   Allergen Reactions    Cimzia [Certolizumab Pegol] Swelling    Desvenlafaxine      Other reaction(s): state of confusion    Ixekizumab Vomiting    Seasonal Ic [Octacosanol] Nasal Congestion    Valproic Acid Other (See Comments)     Other reaction(s): dilated pupils, \"schizi\"    Voltaren [Diclofenac] Swelling    Tizanidine Anxiety       Review of Systems    Video Exam    There were no vitals filed for this visit.    Physical Exam     Behavioral Health Psychotherapy Progress Note    Psychotherapy Provided: Individual Psychotherapy     1. Depression, major, recurrent, moderate (HCC)        2. Generalized anxiety disorder            Goals addressed in session: Goal 1     DATA: Met with Stephanie for her scheduled individual session. She tested positive for Covid last week. She discussed her family concerns-- as her mother and her " "grandmother both tested positive for covid, as well. Stephanie talked about her own treatment with Paxlovid. She states that she is feeling much better already. She states that her uncle has assisted with running errands and doing some chores for the rest of the family. Stephanie states that she has been pet-sitting for her friends, which is a nice break for her. Stephanie states that she is now feeling very determined to start to exercise more, and she has made some plans to \"hoop\" with Kelly, after she is feeling better. In addition, Stephanie states that she got a new planner, so she can start to keep track of her moods. She states that she is keeping track of moods and physical health symptoms. She also plans to track her exercise-- and how difficult or easy it is for her to do the exercise. She continues to journal in several different books-- that she is using to approach different areas of her life. Stephanie discussed her friendships and family relationships. She shared about a recent dream, in which she broke up with Sonia. She states that she felt very good about the dream once she woke up. We spent some time reviewing a DBT handout on describing various moods. Stephanie discussed her discomfort with the emotion of anger. When we reviewed the handout, she acknowledged that the statements were helpful to her. She agreed to continue to use this handout, to learn more about her emotions and how to better regulate them.     During this session, this clinician used the following therapeutic modalities: Client-centered Therapy, Dialectical Behavior Therapy, Mindfulness-based Strategies, Motivational Interviewing, Solution-Focused Therapy, and Supportive Psychotherapy    Substance Abuse was not addressed during this session. If the client is diagnosed with a co-occurring substance use disorder, please indicate any changes in the frequency or amount of use: n/a. Stage of change for addressing substance use diagnoses: No substance " "use/Not applicable    ASSESSMENT:  Stephanie Schneider presents with a Euthymic/ normal mood.     her affect is Normal range and intensity, which is congruent, with her mood and the content of the session. The client has made progress on their goals.    Stephanie Schneider presents with a minimal risk of suicide, minimal risk of self-harm, and minimal risk of harm to others.    For any risk assessment that surpasses a \"low\" rating, a safety plan must be developed.    A safety plan was indicated: no  If yes, describe in detail n/a    PLAN: Between sessions, Stephanie Schneider will continue to monitor her moods. She will use the DBT handout on identifying and describing moods-- to gain further insight into her moods. At the next session, the therapist will use Client-centered Therapy, Dialectical Behavior Therapy, Mindfulness-based Strategies, Motivational Interviewing, Solution-Focused Therapy, and Supportive Psychotherapy to address her mood regulation and continue to move forward with gaining a better understanding of how her moods are impacted by various events and how to gain more control over them.    Behavioral Health Treatment Plan and Discharge Planning: Stephanie Schneider is aware of and agrees to continue to work on their treatment plan. They have identified and are working toward their discharge goals. yes    Visit start and stop times:    02/07/24  Start Time: 0811  Stop Time: 0904  Total Visit Time: 53 minutes      "

## 2024-02-10 PROBLEM — J01.00 ACUTE NON-RECURRENT MAXILLARY SINUSITIS: Status: RESOLVED | Noted: 2023-12-12 | Resolved: 2024-02-10

## 2024-02-21 PROBLEM — Z00.00 WELL ADULT EXAM: Status: RESOLVED | Noted: 2019-12-02 | Resolved: 2024-02-21

## 2024-02-21 PROBLEM — J01.90 ACUTE INFECTION OF NASAL SINUS: Status: RESOLVED | Noted: 2024-01-17 | Resolved: 2024-02-21

## 2024-02-23 ENCOUNTER — TELEMEDICINE (OUTPATIENT)
Dept: BEHAVIORAL/MENTAL HEALTH CLINIC | Facility: CLINIC | Age: 34
End: 2024-02-23

## 2024-02-23 DIAGNOSIS — F33.1 DEPRESSION, MAJOR, RECURRENT, MODERATE (HCC): Primary | Chronic | ICD-10-CM

## 2024-02-23 DIAGNOSIS — F41.1 GENERALIZED ANXIETY DISORDER: Chronic | ICD-10-CM

## 2024-02-23 NOTE — PSYCH
Virtual Regular Visit    Verification of patient location:    Patient is located at Home in the following state in which I hold an active license [place]    Assessment/Plan:    Problem List Items Addressed This Visit          Other    Generalized anxiety disorder (Chronic)    Depression, major, recurrent, moderate (HCC) - Primary (Chronic)     Goals addressed in session: Goal 1      Reason for visit is   Chief Complaint   Patient presents with    Virtual Regular Visit     Encounter provider APARNA Fox    Provider located at [facility]  [facility] [place]  [address] [place] [address] [phone]    Recent Visits  No visits were found meeting these conditions.  Showing recent visits within past 7 days and meeting all other requirements  Today's Visits  Date Type Provider Dept   02/23/24 Telemedicine APARNA Fox  [facility] [place]   Showing today's visits and meeting all other requirements  Future Appointments  No visits were found meeting these conditions.  Showing future appointments within next 150 days and meeting all other requirements     The patient was identified by name and date of birth. Cruz Schneider was informed that this is a telemedicine visit and that the visit is being conducted throughthe Epic Embedded platform. She agrees to proceed..  My office door was closed. No one else was in the room.  She acknowledged consent and understanding of privacy and security of the video platform. The patient has agreed to participate and understands they can discontinue the visit at any time.    Patient is aware this is a billable service.     Subjective  Cruz Schneider is a 33 y.o. female.    HPI     Past Medical History:   Diagnosis Date    Abnormal Pap smear of cervix     Allergic     Anxiety     Arthritis     Depression     Fibromyalgia, primary     GERD (gastroesophageal reflux disease)      Hypertension     IBS (irritable bowel syndrome)     Migraine     Obesity     Vitamin B12 deficiency        Past Surgical History:   Procedure Laterality Date    COLONOSCOPY N/A 5/8/2018    Procedure: COLONOSCOPY;  Surgeon: Stephanie Alston MD;  Location: [facility] GI [facility];  Service: Gastroenterology    OR EXC B9 LESION MRGN XCP SK TG S/N/H/F/G > 4.0CM N/A 7/1/2021    Procedure: EXCISION OF PILAR SCALP CYST X 2 AND RIGHT INNER GROIN NEVVUS;  Surgeon: Denis Barron MD;  Location:  MAIN OR;  Service: Plastics    OR REPAIR COMPLEX SCALP/ARM/LEG 2.6-7.5 CM N/A 7/1/2021    Procedure: CLOSURE WOUND SCALP X 2 AND RIGHT INNER GROIN;  Surgeon: Denis Barron MD;  Location:  MAIN OR;  Service: Plastics    TONSILLECTOMY      WISDOM TOOTH EXTRACTION         Current Outpatient Medications   Medication Sig Dispense Refill    Aimovig 140 MG/ML SOAJ Inject 140mg (1 pen) under the skin every 30 days. 1 mL 11    ALPRAZolam (XANAX) 0.5 mg tablet Take 1 tablet (0.5 mg total) by mouth 2 (two) times a day as needed for anxiety 60 tablet 2    ascorbic acid (VITAMIN C) 500 MG tablet Take 500 mg by mouth daily      Ascorbic Acid 500 MG CAPS Take 500 mg by mouth daily (Patient not taking: Reported on 1/17/2024)      buPROPion (WELLBUTRIN XL) 300 mg 24 hr tablet Take 1 tablet (300 mg total) by mouth daily 90 tablet 0    Cholecalciferol (Vitamin D3) 50 MCG (2000 UT) capsule Take 1 capsule (2,000 Units total) by mouth daily 90 capsule 1    Cholecalciferol (Vitamin D3) 50 MCG (2000 UT) TABS       Cyanocobalamin (Vitamin B-12) 500 MCG SUBL Place 1 tablet (500 mcg total) under the tongue in the morning 30 tablet 2    cyanocobalamin 1,000 mcg/mL 1 IM injection every month 3 mL 3    dicyclomine (BENTYL) 20 mg tablet Take 1 tablet (20 mg total) by mouth 2 (two) times a day 20 tablet 0    diphenoxylate-atropine (LOMOTIL) 2.5-0.025 mg per tablet Take 1 tablet by mouth 4 (four) times a day as needed for diarrhea 10 tablet 0    escitalopram (LEXAPRO) 20  mg tablet Take 1 tablet (20 mg total) by mouth daily 90 tablet 0    esomeprazole (NexIUM) 40 MG capsule Take 40 mg by mouth      etonogestrel-ethinyl estradiol (NuvaRing) 0.12-0.015 MG/24HR vaginal ring Insert ring for 21 days then remove for one week. 3 each 4    FeroSul 325 (65 Fe) MG tablet Take 1 tablet (325 mg total) by mouth in the morning 90 tablet 1    gabapentin (Neurontin) 300 mg capsule Take 1 capsule (300 mg total) by mouth 3 (three) times a day 90 capsule 5    indomethacin (INDOCIN) 25 mg capsule 1-2 tabs BID prn severe headache with food. Hold toradol. 30 capsule 6    inFLIXimab-axxq (Avsola) 100 mg SOLR Inject 5 mg/kg into a catheter in a vein      loperamide (IMODIUM) 2 mg capsule Take 1 capsule (2 mg total) by mouth 4 (four) times a day as needed for diarrhea (Patient not taking: Reported on 1/17/2024) 12 capsule 0    magnesium Oxide (MAG-OX) 400 mg TABS Take 1 tablet (400 mg total) by mouth daily 30 tablet 5    methocarbamol (ROBAXIN) 500 mg tablet       mexiletine (MEXITIL) 150 mg capsule Once daily x 1 week, then  capsule 0    mupirocin (BACTROBAN) 2 % ointment Apply topically 2 (two) times a day 15 g 0    nystatin (MYCOSTATIN) 500,000 units/5 mL suspension Apply 5 mL (500,000 Units total) to the mouth or throat 4 (four) times a day 473 mL 0    OLANZapine (ZyPREXA) 2.5 mg tablet Take 1 tablet (2.5 mg total) by mouth daily Do not take with reglan. 90 tablet 0    onabotulinumtoxin A (BOTOX) 100 units Inject as directed      ondansetron (ZOFRAN) 4 mg tablet       ondansetron (ZOFRAN) 4 mg tablet Take 1 tablet (4 mg total) by mouth every 6 (six) hours (Patient not taking: Reported on 2/1/2024) 30 tablet 2    ondansetron (ZOFRAN-ODT) 4 mg disintegrating tablet Take 1 tablet (4 mg total) by mouth every 6 (six) hours as needed for nausea or vomiting (Patient not taking: Reported on 2/1/2024) 90 tablet 0    pantoprazole (PROTONIX) 40 mg tablet Take 1 tablet (40 mg total) by mouth daily 30 tablet 3  "   pramipexole (MIRAPEX) 0.25 mg tablet Take 1 tablet (0.25 mg total) by mouth daily at bedtime 34 tablet 0    prazosin (MINIPRESS) 2 mg capsule Take 1 capsule (2 mg total) by mouth daily at bedtime 90 capsule 0    predniSONE 5 mg tablet  (Patient not taking: Reported on 1/17/2024)      Riboflavin 400 MG CAPS One tab daily 30 capsule 5    Trudhesa 0.725 MG/ACT AERS 1 spray in each nostril for total dose of 1.45mg, may repeat 1 dose after 1 hour. Max 2 doses per 24 hours and 3 doses per week. 12 mL 6     Current Facility-Administered Medications   Medication Dose Route Frequency Provider Last Rate Last Admin    cyanocobalamin injection 1,000 mcg  1,000 mcg Intramuscular Q30 Days Oni Beckham, DO   1,000 mcg at 08/03/20 0859    cyanocobalamin injection 1,000 mcg  1,000 mcg Intramuscular Q14 Days Oni Beckham, DO   1,000 mcg at 05/18/20 1146    cyanocobalamin injection 1,000 mcg  1,000 mcg Intramuscular Q30 Days Oni Beckham, DO   1,000 mcg at 01/17/24 1407    cyanocobalamin injection 1,000 mcg  1,000 mcg Intramuscular Q30 Days Oni Beckham, DO   1,000 mcg at 01/17/24 1548        Allergies   Allergen Reactions    Cimzia [Certolizumab Pegol] Swelling    Desvenlafaxine      Other reaction(s): state of confusion    Ixekizumab Vomiting    Seasonal Ic [Octacosanol] Nasal Congestion    Valproic Acid Other (See Comments)     Other reaction(s): dilated pupils, \"schizi\"    Voltaren [Diclofenac] Swelling    Tizanidine Anxiety       Review of Systems    Video Exam    There were no vitals filed for this visit.    Physical Exam     Behavioral Health Psychotherapy Progress Note    Psychotherapy Provided: Individual Psychotherapy     1. Depression, major, recurrent, moderate (HCC)        2. Generalized anxiety disorder            Goals addressed in session: Goal 1     DATA: Met with Stephanie for her scheduled individual session. She states that she had her infusion yesterday and is feeling a bit tired. She states that her sed-rate and " "her CRP numbers are steadily decreasing. We discussed the importance of continuing to work on balance between movement and rest-- to gradually improve her stamina and physical tolerance. Stephanie states that she found a meditation plan that she is enjoying. She states that she is trying to make this a daily activity, and she feels that it is helping. Stephanie continues to track her physical and emotional status (with the use of colors) on a calendar. She is also tracking exercise and meditation in a separate book. Stephanie discussed her grandmother's medical status. She states that her grandmother was admitted to the hospital (ICU) and diagnosed with pneumonia and sepsis. She remains in ICU and is currently stable. Stephanie discussed her relationship with her grandmother and her grandmother's statement to her own therapist about wanting to improve relationships with her daughter (Stephanie's mom) and granddaughter (Stephanie). Stephanie states that this comment \"broke\" her. She states that she wants to be able to have a more positive relationship with her grandmother and emphasized that she is willing to put in the work. Stephanie discussed her continued attempts to set limits with her friend Domonique. She reports feeling positively about the limits she is setting. Stephanie talked about her other friends--who are considering moving. Stephanie states that she is feeling sad about this prospective, but she is trying to maintain focus on the present. Stephanie set her goals for the upcoming two weeks, and we will review her progress during our next session.     During this session, this clinician used the following therapeutic modalities: Client-centered Therapy, Dialectical Behavior Therapy, Mindfulness-based Strategies, Motivational Interviewing, Solution-Focused Therapy, and Supportive Psychotherapy    Substance Abuse was not addressed during this session. If the client is diagnosed with a co-occurring substance use disorder, please indicate any " "changes in the frequency or amount of use: n/a. Stage of change for addressing substance use diagnoses: No substance use/Not applicable    ASSESSMENT:  Stephanie Schneider presents with a Euthymic/ normal mood.     her affect is Normal range and intensity, which is congruent, with her mood and the content of the session. The client has made progress on their goals.    Stephanie Schneider presents with a minimal risk of suicide, minimal risk of self-harm, and minimal risk of harm to others.    For any risk assessment that surpasses a \"low\" rating, a safety plan must be developed.    A safety plan was indicated: no  If yes, describe in detail n/a    PLAN: Between sessions, Stephanie Schneider will continue to monitor her moods. She will continue to practice mindfulness-based strategies for both emotional and physical wellness (e.g., meditation and yoga). At the next session, the therapist will use Client-centered Therapy, Dialectical Behavior Therapy, Mindfulness-based Strategies, Motivational Interviewing, Solution-Focused Therapy, and Supportive Psychotherapy to address her mood regulation, physical health concerns, and relationship concerns.    Behavioral Health Treatment Plan and Discharge Planning: Stephanie Schneider is aware of and agrees to continue to work on their treatment plan. They have identified and are working toward their discharge goals. yes    Visit start and stop times:    02/23/24  Start Time: 0932  Stop Time: 1024  Total Visit Time: 52 minutes        "

## 2024-02-26 ENCOUNTER — OFFICE VISIT (OUTPATIENT)
Dept: URGENT CARE | Facility: CLINIC | Age: 34
End: 2024-02-26
Payer: COMMERCIAL

## 2024-02-26 VITALS
WEIGHT: 260 LBS | SYSTOLIC BLOOD PRESSURE: 158 MMHG | HEIGHT: 63 IN | TEMPERATURE: 98.9 F | DIASTOLIC BLOOD PRESSURE: 108 MMHG | OXYGEN SATURATION: 96 % | HEART RATE: 101 BPM | RESPIRATION RATE: 18 BRPM | BODY MASS INDEX: 46.07 KG/M2

## 2024-02-26 DIAGNOSIS — B34.9 VIRAL INFECTION: Primary | ICD-10-CM

## 2024-02-26 DIAGNOSIS — J02.9 SORE THROAT: ICD-10-CM

## 2024-02-26 PROCEDURE — 87636 SARSCOV2 & INF A&B AMP PRB: CPT | Performed by: PHYSICIAN ASSISTANT

## 2024-02-26 PROCEDURE — 99213 OFFICE O/P EST LOW 20 MIN: CPT | Performed by: PHYSICIAN ASSISTANT

## 2024-02-26 RX ORDER — OSELTAMIVIR PHOSPHATE 75 MG/1
75 CAPSULE ORAL 2 TIMES DAILY
Qty: 10 CAPSULE | Refills: 0 | Status: SHIPPED | OUTPATIENT
Start: 2024-02-26 | End: 2024-03-02

## 2024-02-26 NOTE — PROGRESS NOTES
[facility]      NAME: Cruz Schneider is a 33 y.o. female  : 1990    MRN: [id number]  DATE: 2024  TIME: 1:32 PM    Assessment and Plan   Viral infection [B34.9]  1. Viral infection  oseltamivir (TAMIFLU) 75 mg capsule      2. Sore throat  Covid/Flu-Office Collect          Patient Instructions   Covid flu sendout. Suspect flu, tamiflu called in, if culture postiive for flu ok to continue, if negative to d/c. To call pcp about increased bp. Infection appears viral.  Recommend symptomatic treatment.  Can take ibuprofen or tylenol as needed for pain or fever.  Over the counter cough and cold medications to help with symptoms.  Use salt water gargles for sore throat and throat lozenges.  Cough drops as needed.  Wash hands frequently to prevent the spread of infection.  If not improving over the next 7-10 days, follow up with PCP.  Symptoms may persist for 10-14 days.  To present to the ER if symptoms worsen.  Chief Complaint     Chief Complaint   Patient presents with    Cold Like Symptoms     Patient c/o congestion, body aches, sore throat, and diarrhea that started yesterday.           History of Present Illness   Cruz Schneider presents to the clinic c/o    Generalized Body Aches  The current episode started yesterday. The problem occurs constantly. The problem is unchanged. Associated symptoms include congestion, ear pain, headaches, a sore throat, fatigue and diarrhea. Pertinent negatives include no ear discharge, eye discharge, eye itching, eye pain, eye redness, photophobia, fever, chest pain, coughing, shortness of breath, wheezing, abdominal pain or rash.   Neg at home covid. Also had covid last month.     Review of Systems   Review of Systems   Constitutional:  Positive for fatigue. Negative for chills, diaphoresis and fever.   HENT:  Positive for congestion, ear pain and sore throat. Negative for ear discharge and facial swelling.    Eyes:  Negative for photophobia, pain,  discharge, redness, itching and visual disturbance.   Respiratory:  Negative for apnea, cough, chest tightness, shortness of breath and wheezing.    Cardiovascular:  Negative for chest pain and palpitations.   Gastrointestinal:  Positive for diarrhea. Negative for abdominal pain.   Musculoskeletal:  Positive for myalgias.   Skin:  Negative for color change, rash and wound.   Neurological:  Positive for headaches. Negative for dizziness.   Hematological:  Negative for adenopathy.         Current Medications     Long-Term Medications   Medication Sig Dispense Refill    ALPRAZolam (XANAX) 0.5 mg tablet Take 1 tablet (0.5 mg total) by mouth 2 (two) times a day as needed for anxiety 60 tablet 2    ascorbic acid (VITAMIN C) 500 MG tablet Take 500 mg by mouth daily      buPROPion (WELLBUTRIN XL) 300 mg 24 hr tablet Take 1 tablet (300 mg total) by mouth daily 90 tablet 0    Cholecalciferol (Vitamin D3) 50 MCG (2000 UT) capsule Take 1 capsule (2,000 Units total) by mouth daily 90 capsule 1    Cholecalciferol (Vitamin D3) 50 MCG (2000 UT) TABS       cyanocobalamin 1,000 mcg/mL 1 IM injection every month 3 mL 3    dicyclomine (BENTYL) 20 mg tablet Take 1 tablet (20 mg total) by mouth 2 (two) times a day 20 tablet 0    escitalopram (LEXAPRO) 20 mg tablet Take 1 tablet (20 mg total) by mouth daily 90 tablet 0    esomeprazole (NexIUM) 40 MG capsule Take 40 mg by mouth      etonogestrel-ethinyl estradiol (NuvaRing) 0.12-0.015 MG/24HR vaginal ring Insert ring for 21 days then remove for one week. 3 each 4    FeroSul 325 (65 Fe) MG tablet Take 1 tablet (325 mg total) by mouth in the morning 90 tablet 1    gabapentin (Neurontin) 300 mg capsule Take 1 capsule (300 mg total) by mouth 3 (three) times a day 90 capsule 5    indomethacin (INDOCIN) 25 mg capsule 1-2 tabs BID prn severe headache with food. Hold toradol. 30 capsule 6    inFLIXimab-axxq (Avsola) 100 mg SOLR Inject 5 mg/kg into a catheter in a vein      magnesium Oxide (MAG-OX)  400 mg TABS Take 1 tablet (400 mg total) by mouth daily 30 tablet 5    OLANZapine (ZyPREXA) 2.5 mg tablet Take 1 tablet (2.5 mg total) by mouth daily Do not take with reglan. 90 tablet 0    ondansetron (ZOFRAN) 4 mg tablet Take 1 tablet (4 mg total) by mouth every 6 (six) hours 30 tablet 2    pantoprazole (PROTONIX) 40 mg tablet Take 1 tablet (40 mg total) by mouth daily 30 tablet 3    pramipexole (MIRAPEX) 0.25 mg tablet Take 1 tablet (0.25 mg total) by mouth daily at bedtime 34 tablet 0    prazosin (MINIPRESS) 2 mg capsule Take 1 capsule (2 mg total) by mouth daily at bedtime 90 capsule 0    Ascorbic Acid 500 MG CAPS Take 500 mg by mouth daily (Patient not taking: Reported on 1/17/2024)      Cyanocobalamin (Vitamin B-12) 500 MCG SUBL Place 1 tablet (500 mcg total) under the tongue in the morning (Patient not taking: Reported on 2/26/2024) 30 tablet 2    mexiletine (MEXITIL) 150 mg capsule Once daily x 1 week, then BID (Patient not taking: Reported on 2/26/2024) 180 capsule 0    mupirocin (BACTROBAN) 2 % ointment Apply topically 2 (two) times a day (Patient not taking: Reported on 2/26/2024) 15 g 0    ondansetron (ZOFRAN) 4 mg tablet  (Patient not taking: Reported on 2/26/2024)      ondansetron (ZOFRAN-ODT) 4 mg disintegrating tablet Take 1 tablet (4 mg total) by mouth every 6 (six) hours as needed for nausea or vomiting (Patient not taking: Reported on 2/1/2024) 90 tablet 0    Riboflavin 400 MG CAPS One tab daily (Patient not taking: Reported on 2/26/2024) 30 capsule 5       Current Allergies     Allergies as of 02/26/2024 - Reviewed 02/26/2024   Allergen Reaction Noted    Cimzia [certolizumab pegol] Swelling 03/22/2022    Desvenlafaxine  11/26/2012    Ixekizumab Vomiting 05/27/2022    Seasonal ic [octacosanol] Nasal Congestion 11/14/2023    Valproic acid Other (See Comments) 10/18/2017    Voltaren [diclofenac] Swelling 12/13/2022    Tizanidine Anxiety 01/20/2021            The following portions of the patient's  history were reviewed and updated as appropriate: allergies, current medications, past family history, past medical history, past social history, past surgical history and problem list.  Past Medical History:   Diagnosis Date    Abnormal Pap smear of cervix     Allergic     Anxiety     Arthritis     Depression     Fibromyalgia, primary     GERD (gastroesophageal reflux disease)     Hypertension     IBS (irritable bowel syndrome)     Migraine     Obesity     Vitamin B12 deficiency      Past Surgical History:   Procedure Laterality Date    COLONOSCOPY N/A 5/8/2018    Procedure: COLONOSCOPY;  Surgeon: Stephanie Alston MD;  Location: [facility] GI LAB;  Service: Gastroenterology    MA EXC B9 LESION MRGN XCP SK TG S/N/H/F/G > 4.0CM N/A 7/1/2021    Procedure: EXCISION OF PILAR SCALP CYST X 2 AND RIGHT INNER GROIN NEVVUS;  Surgeon: Denis Barron MD;  Location:  MAIN OR;  Service: Plastics    MA REPAIR COMPLEX SCALP/ARM/LEG 2.6-7.5 CM N/A 7/1/2021    Procedure: CLOSURE WOUND SCALP X 2 AND RIGHT INNER GROIN;  Surgeon: Denis Barron MD;  Location:  MAIN OR;  Service: Plastics    TONSILLECTOMY      WISDOM TOOTH EXTRACTION       Social History     Socioeconomic History    Marital status: Single     Spouse name: Not on file    Number of children: 0    Years of education: 12    Highest education level: Not on file   Occupational History    Occupation: Unemployed     Employer: [employer]   Tobacco Use    Smoking status: Never     Passive exposure: Past    Smokeless tobacco: Never   Vaping Use    Vaping status: Some Days    Start date: 7/1/2019    Substances: THC, CBD   Substance and Sexual Activity    Alcohol use: Not Currently     Comment: rarely    Drug use: Yes     Frequency: 2.0 times per week     Types: Marijuana     Comment: medical marijuana (vape)    Sexual activity: Not Currently     Partners: Male     Comment: Nuva ring   Other Topics Concern    Not on file   Social History Narrative    Home:  Living with mom and MGM      "   Education:    Pt denies any h/o learning disability Dxs but admits to having great difficulty in math requiring a .  She reached childhood milestones on time as far as he knows.    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 On reflection, she feels it was moreso her anxiety as the cause of her leaving school, and she was using the other reason as an excuse so she would not have to admit to anxiety at that time.  She is now very open about her anxiety and does not mind that I have this information in the general social section of her chart.     Social Determinants of Health     Financial Resource Strain: Low Risk  (8/16/2022)    Overall Financial Resource Strain (CARDIA)     Difficulty of Paying Living Expenses: Not hard at all   Food Insecurity: No Food Insecurity (8/16/2022)    Hunger Vital Sign     Worried About Running Out of Food in the Last Year: Never true     Ran Out of Food in the Last Year: Never true   Transportation Needs: No Transportation Needs (8/16/2022)    PRAPARE - Transportation     Lack of Transportation (Medical): No     Lack of Transportation (Non-Medical): No   Physical Activity: Unknown (2/11/2021)    Exercise Vital Sign     Days of Exercise per Week: 0 days     Minutes of Exercise per Session: Not on file   Stress: Not on file   Social Connections: Not on file   Intimate Partner Violence: Not on file   Housing Stability: Not on file       Objective   BP (!) 158/108 Comment: manual  Pulse 101   Temp 98.9 °F (37.2 °C) (Temporal)   Resp 18   Ht 5' 3\" (1.6 m)   Wt 118 kg (260 lb)   SpO2 96%   BMI 46.06 kg/m²      Physical Exam     Physical Exam  Vitals and nursing note reviewed.   Constitutional:       General: She is not in acute distress.     Appearance: She is " well-developed. She is not diaphoretic.   HENT:      Head: Normocephalic and atraumatic.      Right Ear: Tympanic membrane and external ear normal.      Left Ear: Tympanic membrane and external ear normal.      Nose: Nose normal.      Mouth/Throat:      Mouth: Mucous membranes are moist.      Pharynx: No oropharyngeal exudate or posterior oropharyngeal erythema.   Eyes:      General: No scleral icterus.        Right eye: No discharge.         Left eye: No discharge.      Conjunctiva/sclera: Conjunctivae normal.   Cardiovascular:      Rate and Rhythm: Normal rate and regular rhythm.      Heart sounds: Normal heart sounds. No murmur heard.     No friction rub. No gallop.   Pulmonary:      Effort: Pulmonary effort is normal. No respiratory distress.      Breath sounds: Normal breath sounds. No decreased breath sounds, wheezing, rhonchi or rales.   Abdominal:      General: Bowel sounds are normal.      Tenderness: There is no abdominal tenderness. There is no right CVA tenderness, left CVA tenderness, guarding or rebound.   Skin:     General: Skin is warm and dry.      Coloration: Skin is not pale.      Findings: No erythema or rash.   Neurological:      Mental Status: She is alert and oriented to person, place, and time.   Psychiatric:         Behavior: Behavior normal.         Thought Content: Thought content normal.         Judgment: Judgment normal.         Renata Paulson PA-C

## 2024-02-27 LAB
FLUAV RNA RESP QL NAA+PROBE: NEGATIVE
FLUBV RNA RESP QL NAA+PROBE: NEGATIVE
SARS-COV-2 RNA RESP QL NAA+PROBE: NEGATIVE

## 2024-03-01 ENCOUNTER — OFFICE VISIT (OUTPATIENT)
Dept: FAMILY MEDICINE CLINIC | Facility: CLINIC | Age: 34
End: 2024-03-01
Payer: COMMERCIAL

## 2024-03-01 VITALS
SYSTOLIC BLOOD PRESSURE: 134 MMHG | TEMPERATURE: 97.7 F | HEART RATE: 90 BPM | OXYGEN SATURATION: 99 % | BODY MASS INDEX: 45.79 KG/M2 | DIASTOLIC BLOOD PRESSURE: 90 MMHG | HEIGHT: 63 IN | WEIGHT: 258.4 LBS

## 2024-03-01 DIAGNOSIS — R39.15 URINARY URGENCY: ICD-10-CM

## 2024-03-01 DIAGNOSIS — J02.0 PHARYNGITIS DUE TO STREPTOCOCCUS SPECIES: ICD-10-CM

## 2024-03-01 DIAGNOSIS — R03.0 ELEVATED BLOOD PRESSURE READING: ICD-10-CM

## 2024-03-01 DIAGNOSIS — R35.0 URINARY FREQUENCY: ICD-10-CM

## 2024-03-01 DIAGNOSIS — N30.90 CYSTITIS: Primary | ICD-10-CM

## 2024-03-01 LAB
BACTERIA UR QL AUTO: ABNORMAL /HPF
BILIRUB UR QL STRIP: NEGATIVE
CLARITY UR: CLEAR
COLOR UR: YELLOW
GLUCOSE UR STRIP-MCNC: NEGATIVE MG/DL
HGB UR QL STRIP.AUTO: NEGATIVE
KETONES UR STRIP-MCNC: NEGATIVE MG/DL
LEUKOCYTE ESTERASE UR QL STRIP: ABNORMAL
MUCOUS THREADS UR QL AUTO: ABNORMAL
NITRITE UR QL STRIP: NEGATIVE
NON-SQ EPI CELLS URNS QL MICRO: ABNORMAL /HPF
PH UR STRIP.AUTO: 6.5 [PH]
PROT UR STRIP-MCNC: ABNORMAL MG/DL
RBC #/AREA URNS AUTO: ABNORMAL /HPF
SP GR UR STRIP.AUTO: 1.02 (ref 1–1.03)
UROBILINOGEN UR STRIP-ACNC: <2 MG/DL
WBC #/AREA URNS AUTO: ABNORMAL /HPF

## 2024-03-01 PROCEDURE — 81001 URINALYSIS AUTO W/SCOPE: CPT | Performed by: FAMILY MEDICINE

## 2024-03-01 PROCEDURE — 99214 OFFICE O/P EST MOD 30 MIN: CPT | Performed by: FAMILY MEDICINE

## 2024-03-01 PROCEDURE — 87086 URINE CULTURE/COLONY COUNT: CPT | Performed by: FAMILY MEDICINE

## 2024-03-01 RX ORDER — AMOXICILLIN AND CLAVULANATE POTASSIUM 875; 125 MG/1; MG/1
1 TABLET, FILM COATED ORAL EVERY 12 HOURS SCHEDULED
Qty: 14 TABLET | Refills: 0 | Status: SHIPPED | OUTPATIENT
Start: 2024-03-01 | End: 2024-03-08

## 2024-03-01 NOTE — PROGRESS NOTES
Assessment/Plan: UA CNS done at this time.  Patient will increase fluids regarding possible cystitis.  Patient use Augmentin as directed.  Patient will have supportive care.  Patient with low-salt diet regarding elevated blood pressure reading.  Patient will follow-up in 2 weeks for blood pressure evaluation.  Patient will follow-up per routine.       Diagnoses and all orders for this visit:    Cystitis  -     amoxicillin-clavulanate (AUGMENTIN) 875-125 mg per tablet; Take 1 tablet by mouth every 12 (twelve) hours for 7 days    Urinary frequency  -     UA w Reflex to Microscopic w Reflex to Culture  -     Urine culture    Urinary urgency  -     UA w Reflex to Microscopic w Reflex to Culture  -     Urine culture    Pharyngitis due to Streptococcus species  -     amoxicillin-clavulanate (AUGMENTIN) 875-125 mg per tablet; Take 1 tablet by mouth every 12 (twelve) hours for 7 days    Elevated blood pressure reading            Subjective:        Patient ID: Cruz Schneider is a 33 y.o. female.      Patient is here with bodyaches congestion sinus pressure diarrhea sore throat since Monday.  Patient was given Tamiflu.  Minimal improvement.  Patient with some urinary urgency and frequency.  No fevers noted.  Patient was also noted to have elevated blood pressure reading at urgent care.  Patient did have some anxiety at the time.          The following portions of the patient's history were reviewed and updated as appropriate: allergies, current medications, past family history, past medical history, past social history, past surgical history and problem list.      Review of Systems   Constitutional: Negative.  Negative for fever.   HENT:  Positive for congestion, postnasal drip, rhinorrhea, sinus pressure, sinus pain and sore throat.    Eyes: Negative.    Respiratory:  Positive for cough.    Cardiovascular: Negative.    Gastrointestinal: Negative.    Endocrine: Negative.    Genitourinary:  Positive for frequency and  "urgency.   Musculoskeletal: Negative.    Skin: Negative.    Allergic/Immunologic: Negative.    Neurological: Negative.    Hematological: Negative.    Psychiatric/Behavioral: Negative.             Objective:        Tobacco Cessation Counseling: Tobacco cessation counseling was provided. The patient is sincerely urged to quit consumption of tobacco. She is not ready to quit tobacco. Medication options discussed.             /90 (BP Location: Right arm, Patient Position: Sitting, Cuff Size: Large)   Pulse 90   Temp 97.7 °F (36.5 °C) (Temporal)   Ht 5' 3\" (1.6 m)   Wt 117 kg (258 lb 6.4 oz)   SpO2 99%   BMI 45.77 kg/m²          Physical Exam  Vitals and nursing note reviewed.   Constitutional:       General: She is not in acute distress.     Appearance: Normal appearance. She is not ill-appearing, toxic-appearing or diaphoretic.   HENT:      Head: Normocephalic and atraumatic.      Right Ear: Tympanic membrane, ear canal and external ear normal. There is no impacted cerumen.      Left Ear: Tympanic membrane, ear canal and external ear normal. There is no impacted cerumen.      Nose: Nose normal. No congestion or rhinorrhea.      Mouth/Throat:      Mouth: Mucous membranes are moist.      Pharynx: Posterior oropharyngeal erythema present. No oropharyngeal exudate.   Eyes:      General: No scleral icterus.        Right eye: No discharge.         Left eye: No discharge.   Neck:      Vascular: No carotid bruit.   Cardiovascular:      Rate and Rhythm: Normal rate and regular rhythm.      Pulses: Normal pulses.      Heart sounds: Normal heart sounds. No murmur heard.     No friction rub. No gallop.   Pulmonary:      Effort: Pulmonary effort is normal. No respiratory distress.      Breath sounds: Normal breath sounds. No stridor. No wheezing, rhonchi or rales.   Chest:      Chest wall: No tenderness.   Musculoskeletal:         General: No swelling, tenderness, deformity or signs of injury. Normal range of motion.    "   Cervical back: Normal range of motion and neck supple. No rigidity. No muscular tenderness.      Right lower leg: No edema.      Left lower leg: No edema.   Lymphadenopathy:      Cervical: Cervical adenopathy present.   Skin:     General: Skin is warm and dry.      Capillary Refill: Capillary refill takes less than 2 seconds.      Coloration: Skin is not jaundiced.      Findings: No bruising, erythema, lesion or rash.   Neurological:      General: No focal deficit present.      Mental Status: She is alert and oriented to person, place, and time.      Cranial Nerves: No cranial nerve deficit.      Sensory: No sensory deficit.      Motor: No weakness.      Coordination: Coordination normal.      Gait: Gait normal.   Psychiatric:         Mood and Affect: Mood normal.         Behavior: Behavior normal.         Thought Content: Thought content normal.         Judgment: Judgment normal.

## 2024-03-03 LAB — BACTERIA UR CULT: NORMAL

## 2024-03-08 ENCOUNTER — TELEMEDICINE (OUTPATIENT)
Dept: BEHAVIORAL/MENTAL HEALTH CLINIC | Facility: CLINIC | Age: 34
End: 2024-03-08
Payer: COMMERCIAL

## 2024-03-08 DIAGNOSIS — G43.709 CHRONIC MIGRAINE WITHOUT AURA WITHOUT STATUS MIGRAINOSUS, NOT INTRACTABLE: ICD-10-CM

## 2024-03-08 DIAGNOSIS — F33.1 DEPRESSION, MAJOR, RECURRENT, MODERATE (HCC): Primary | Chronic | ICD-10-CM

## 2024-03-08 DIAGNOSIS — F41.1 GENERALIZED ANXIETY DISORDER: Chronic | ICD-10-CM

## 2024-03-08 PROCEDURE — 90834 PSYTX W PT 45 MINUTES: CPT | Performed by: SOCIAL WORKER

## 2024-03-08 NOTE — TELEPHONE ENCOUNTER
Received VM transcription from 3/7/24, 10:24 AM:    Hi, this is Brianda. I'm a pharmacy tech [facility], calling for a refill for patient, Harish Schneidre. YOB: 1990. And we're calling for a refill for the Amovig auto injector 140 mg/mL. Our callback number is [phone]. And our fax number is [phone]. Thank you.  -----------------------    Aimovig 140 mg/mL    Next OV 5/14/2024 with MK.    Last script sent 3/16/2023. Quantity 1 mL. Refills: 11.     PA expires 5/2/24    MK - Rx entered. Pleaser review and sign if in agreement.

## 2024-03-08 NOTE — PSYCH
Virtual Regular Visit    Verification of patient location:    Patient is located at Home in the following state in which I hold an active license [place]      Assessment/Plan:    Problem List Items Addressed This Visit          Behavioral Health    Generalized anxiety disorder (Chronic)    Depression, major, recurrent, moderate (HCC) - Primary (Chronic)       Goals addressed in session: Goal 1          Reason for visit is   Chief Complaint   Patient presents with    Virtual Regular Visit          Encounter provider APARNA Fox    Provider located at [facility] [place]  [facility] [place]  [address]  [place] [address] [phone]      Recent Visits  Date Type Provider Dept   03/14/24 Office Visit Oni Beckham DO [facility]   Showing recent visits within past 7 days and meeting all other requirements  Future Appointments  No visits were found meeting these conditions.  Showing future appointments within next 150 days and meeting all other requirements       The patient was identified by name and date of birth. Cruz Schneider was informed that this is a telemedicine visit and that the visit is being conducted throughthe Epic Embedded platform. She agrees to proceed..  My office door was closed. No one else was in the room.  She acknowledged consent and understanding of privacy and security of the video platform. The patient has agreed to participate and understands they can discontinue the visit at any time.    Patient is aware this is a billable service.     Subjective  Cruz Schneider is a 33 y.o. female.      HPI     Past Medical History:   Diagnosis Date    Abnormal Pap smear of cervix     Allergic     Anxiety     Arthritis     Depression     Fibromyalgia, primary     GERD (gastroesophageal reflux disease)     Hypertension     IBS (irritable bowel syndrome)     Migraine     Obesity     Vitamin B12 deficiency        Past  Surgical History:   Procedure Laterality Date    COLONOSCOPY N/A 5/8/2018    Procedure: COLONOSCOPY;  Surgeon: Stephanie Alston MD;  Location: [facility] GI [facility];  Service: Gastroenterology    UT EXC B9 LESION MRGN XCP SK TG S/N/H/F/G > 4.0CM N/A 7/1/2021    Procedure: EXCISION OF PILAR SCALP CYST X 2 AND RIGHT INNER GROIN NEVVUS;  Surgeon: Denis Barron MD;  Location:  MAIN OR;  Service: Plastics    UT REPAIR COMPLEX SCALP/ARM/LEG 2.6-7.5 CM N/A 7/1/2021    Procedure: CLOSURE WOUND SCALP X 2 AND RIGHT INNER GROIN;  Surgeon: Denis Barron MD;  Location:  MAIN OR;  Service: Plastics    TONSILLECTOMY      WISDOM TOOTH EXTRACTION         Current Outpatient Medications   Medication Sig Dispense Refill    ALPRAZolam (XANAX) 0.5 mg tablet Take 1 tablet (0.5 mg total) by mouth 2 (two) times a day as needed for anxiety 60 tablet 2    ascorbic acid (VITAMIN C) 500 MG tablet Take 500 mg by mouth daily      Ascorbic Acid 500 MG CAPS Take 500 mg by mouth daily (Patient not taking: Reported on 1/17/2024)      buPROPion (WELLBUTRIN XL) 300 mg 24 hr tablet Take 1 tablet (300 mg total) by mouth daily 90 tablet 0    Cholecalciferol (Vitamin D3) 50 MCG (2000 UT) capsule Take 1 capsule (2,000 Units total) by mouth daily 90 capsule 1    Cholecalciferol (Vitamin D3) 50 MCG (2000 UT) TABS       Cyanocobalamin (Vitamin B-12) 500 MCG SUBL Place 1 tablet (500 mcg total) under the tongue in the morning 30 tablet 2    cyanocobalamin 1,000 mcg/mL 1 IM injection every month 3 mL 3    dicyclomine (BENTYL) 20 mg tablet Take 1 tablet (20 mg total) by mouth 2 (two) times a day 20 tablet 0    diphenoxylate-atropine (LOMOTIL) 2.5-0.025 mg per tablet Take 1 tablet by mouth 4 (four) times a day as needed for diarrhea (Patient not taking: Reported on 2/26/2024) 10 tablet 0    Erenumab-aooe (Aimovig) 140 MG/ML SOAJ Inject 140mg (1 pen) under the skin every 30 days. 1 mL 11    escitalopram (LEXAPRO) 20 mg tablet Take 1 tablet (20 mg total) by mouth  daily 90 tablet 0    esomeprazole (NexIUM) 40 MG capsule Take 40 mg by mouth      etonogestrel-ethinyl estradiol (NuvaRing) 0.12-0.015 MG/24HR vaginal ring Insert ring for 21 days then remove for one week. 3 each 4    FeroSul 325 (65 Fe) MG tablet Take 1 tablet (325 mg total) by mouth in the morning 90 tablet 1    gabapentin (Neurontin) 300 mg capsule Take 1 capsule (300 mg total) by mouth 3 (three) times a day 90 capsule 5    indomethacin (INDOCIN) 25 mg capsule 1-2 tabs BID prn severe headache with food. Hold toradol. 30 capsule 6    inFLIXimab-axxq (Avsola) 100 mg SOLR Inject 5 mg/kg into a catheter in a vein      loperamide (IMODIUM) 2 mg capsule Take 1 capsule (2 mg total) by mouth 4 (four) times a day as needed for diarrhea 12 capsule 0    magnesium Oxide (MAG-OX) 400 mg TABS Take 1 tablet (400 mg total) by mouth daily 30 tablet 5    methocarbamol (ROBAXIN) 500 mg tablet       metoprolol succinate (Toprol XL) 25 mg 24 hr tablet Take 1 tablet (25 mg total) by mouth daily 90 tablet 3    mexiletine (MEXITIL) 150 mg capsule Once daily x 1 week, then  capsule 0    mupirocin (BACTROBAN) 2 % ointment Apply topically 2 (two) times a day 15 g 0    nystatin (MYCOSTATIN) 500,000 units/5 mL suspension Apply 5 mL (500,000 Units total) to the mouth or throat 4 (four) times a day (Patient not taking: Reported on 2/26/2024) 473 mL 0    OLANZapine (ZyPREXA) 2.5 mg tablet Take 1 tablet (2.5 mg total) by mouth daily Do not take with reglan. 90 tablet 0    onabotulinumtoxin A (BOTOX) 100 units Inject as directed      ondansetron (ZOFRAN) 4 mg tablet  (Patient not taking: Reported on 2/26/2024)      ondansetron (ZOFRAN) 4 mg tablet Take 1 tablet (4 mg total) by mouth every 6 (six) hours 30 tablet 2    ondansetron (ZOFRAN-ODT) 4 mg disintegrating tablet Take 1 tablet (4 mg total) by mouth every 6 (six) hours as needed for nausea or vomiting (Patient not taking: Reported on 2/1/2024) 90 tablet 0    pantoprazole (PROTONIX) 40  "mg tablet Take 1 tablet (40 mg total) by mouth daily (Patient not taking: Reported on 3/12/2024) 30 tablet 3    pramipexole (MIRAPEX) 0.25 mg tablet Take 1 tablet (0.25 mg total) by mouth 2 (two) times a day 68 tablet 3    prazosin (MINIPRESS) 2 mg capsule Take 1 capsule (2 mg total) by mouth daily at bedtime 90 capsule 0    predniSONE 5 mg tablet       Trudhesa 0.725 MG/ACT AERS 1 spray in each nostril for total dose of 1.45mg, may repeat 1 dose after 1 hour. Max 2 doses per 24 hours and 3 doses per week. 12 mL 6     Current Facility-Administered Medications   Medication Dose Route Frequency Provider Last Rate Last Admin    cyanocobalamin injection 1,000 mcg  1,000 mcg Intramuscular Q30 Days Oni Beckham, DO   1,000 mcg at 03/14/24 1450        Allergies   Allergen Reactions    Cimzia [Certolizumab Pegol] Swelling    Desvenlafaxine      Other reaction(s): state of confusion    Ixekizumab Vomiting    Seasonal Ic [Octacosanol] Nasal Congestion    Valproic Acid Other (See Comments)     Other reaction(s): dilated pupils, \"schizi\"    Voltaren [Diclofenac] Swelling    Tizanidine Anxiety       Review of Systems    Video Exam    There were no vitals filed for this visit.    Physical Exam     Behavioral Health Psychotherapy Progress Note    Psychotherapy Provided: Individual Psychotherapy     1. Depression, major, recurrent, moderate (HCC)        2. Generalized anxiety disorder            Goals addressed in session: Goal 1     DATA: Met with Stephanie for her scheduled individual session. She states that her grandmother's medical status is still somewhat tenuous. She states that her grandmother is considering the possibility of a DNR, which Stephanie struggles with accepting. She states that, at the time of this current hospitalization, her grandmother required some intensive interventions that would probably not have occurred if she was on a DNR. Stephanie states that her grandmother has been getting along with her more positively. " "She states that her grandmother made a point of telling her that she loved her, which Stephanie states made her very emotional. Stephanie discussed her overall mood regulation. She acknowledges that she is struggling with her moods-- but she does identify that this is, in large part, related to the stress of the situation with her grandmother. Stephanie states that she is trying to increase her use of mindfulness-based strategies to manage her moods.     During this session, this clinician used the following therapeutic modalities: Client-centered Therapy, Dialectical Behavior Therapy, Mindfulness-based Strategies, Motivational Interviewing, Solution-Focused Therapy, and Supportive Psychotherapy    Substance Abuse was not addressed during this session. If the client is diagnosed with a co-occurring substance use disorder, please indicate any changes in the frequency or amount of use: n/a. Stage of change for addressing substance use diagnoses: No substance use/Not applicable    ASSESSMENT:  Stephanie Schneider presents with a Anxious mood.     her affect is Normal range and intensity, which is congruent, with her mood and the content of the session. The client has not made progress on their goals since our most recent session.    Stephanie Schneider presents with a minimal risk of suicide, minimal risk of self-harm, and minimal risk of harm to others.    For any risk assessment that surpasses a \"low\" rating, a safety plan must be developed.    A safety plan was indicated: no  If yes, describe in detail n/a    PLAN: Between sessions, Stephanie Schneider will continue to practice mindfulness-based strategies to manage her moods. She will monitor her moods. If she needs a session sooner than two weeks, she will reach out to let me know. At the next session, the therapist will use Client-centered Therapy, Dialectical Behavior Therapy, Mindfulness-based Strategies, Motivational Interviewing, Solution-Focused Therapy, and Supportive " Psychotherapy to address her mood regulation and family relationships.    Behavioral Health Treatment Plan and Discharge Planning: Stephanie Schneider is aware of and agrees to continue to work on their treatment plan. They have identified and are working toward their discharge goals. yes    Visit start and stop times:    03/08/24  Start Time: 0810  Stop Time: 0859  Total Visit Time: 49 minutes

## 2024-03-11 RX ORDER — ERENUMAB-AOOE 140 MG/ML
INJECTION, SOLUTION SUBCUTANEOUS
Qty: 1 ML | Refills: 11 | Status: SHIPPED | OUTPATIENT
Start: 2024-03-11

## 2024-03-12 ENCOUNTER — TELEMEDICINE (OUTPATIENT)
Dept: NEUROLOGY | Facility: CLINIC | Age: 34
End: 2024-03-12
Payer: COMMERCIAL

## 2024-03-12 DIAGNOSIS — E53.8 B12 DEFICIENCY: ICD-10-CM

## 2024-03-12 DIAGNOSIS — G43.709 CHRONIC MIGRAINE WITHOUT AURA WITHOUT STATUS MIGRAINOSUS, NOT INTRACTABLE: Primary | ICD-10-CM

## 2024-03-12 PROCEDURE — 99213 OFFICE O/P EST LOW 20 MIN: CPT | Performed by: PHYSICIAN ASSISTANT

## 2024-03-12 NOTE — PROGRESS NOTES
Virtual Regular Visit    Verification of patient location:    Patient is located at Home in the following state in which I hold an active license [place]      Assessment/Plan:    Problem List Items Addressed This Visit          Cardiovascular and Mediastinum    Chronic migraine without aura without status migrainosus, not intractable - Primary       Other    B12 deficiency     She is going to let me know if she would like trigger point injections on an emergent basis a few weeks prior to the next Botox injection.  Botox wears off sometimes 1 or 2 weeks prior to the next Botox injection, and she has breakthrough headaches.  We cannot decrease the weeks between Botox procedures, so we decided to do trigger point injections if needed.  She will keep in touch.    Continue 400 mg magnesium daily for prevention.  Continue Emgality monthly 140 mg.  She also takes gabapentin and metoprolol for other reasons.    She can continue the same medications as needed: Indomethacin, if that fails Trudhesa nasal spray works last resort.    The patient continues to be strongly triggered by many smells even if it is a mild send or odor.  She is managing this however in the past I recommended trying mexiletine for further reduction of these migraines.  She will consider adding this if the migraines get worse.    Continue B12 supplement daily by mouth.    The patient should not hesitate to call me prior to her follow up with any questions or concerns.         Reason for visit is   Chief Complaint   Patient presents with    Virtual Regular Visit          Encounter provider Jacki Perze PA-C    Provider located at [address] [place] [address] [facility] [address] [place] [address]      Recent Visits  No visits were found meeting these conditions.  Showing recent visits within past 7 days and meeting all other requirements  Future Appointments  No visits were found meeting these conditions.  Showing future  appointments within next 150 days and meeting all other requirements       The patient was identified by name and date of birth. Cruz Schneider was informed that this is a telemedicine visit and that the visit is being conducted through the Epic Embedded platform. She agrees to proceed..  My office door was closed. No one else was in the room.  She acknowledged consent and understanding of privacy and security of the video platform. The patient has agreed to participate and understands they can discontinue the visit at any time.    Patient is aware this is a billable service.     Subjective  Cruz Schneider is a 33 y.o. female who is contacted via telemedicine for neurological follow-up.      HPI   Migraines have been pretty good with Botox, she gets about 3-4 a month.    The patient is noticing a significant reduction of migraine headaches with Botox and Emgality combination.  She denies side effects to either therapy.  Since starting botox, the patient reports greater than 7 days of migraine relief from baseline, correlated with headache diary, decreased abortive medication use and decreased ER visits.    No significant character changes to the migraines, however over the past year or so she has noticed increased smell sensitivity and a lot of sense trigger migraines.  She is trying to manage this by avoidance.    Continues Nuvaring.  Not planning pregnancy at this time.     Migraines:  Reaches pain level at worst: 10/10  Frequency: 3-4 migraines per month  Duration: 1-2 days if able to catch it  Location: left frontal/ temporal, last night on right side, apex, headband region  Quality: throbbing  Worse with: not worse with bend, cough, sneeze  Associated with: nausea, vomiting, photophobia, phonophobia, smell sens, dizziness, new sxs of drunk feeling and spacy     Triggers: Certain smells, sometimes caffeine, skipping meals, lack of sleep, menstrual cycle      Aura/ warning: none     Medications  tried:  Prevention-  Aimovig- helping  Emgality  Depakote-side effects/allergy  Gabapentin  Methocarbamol  Tizanidine-allergy  Lyrica  Propranolol  Topamax, Trokendi XR  Zoloft     Abortive-  Tylenol  Decadron- typically helps break the cycle, did not this time unfortunately  Sumatriptan  Rizatriptan  toradol- takes first, then uses indocin if that fails (recently these have not been helping)  Indomethacin  Reglan, Zofran  Naproxen  Olanzapine-cannot take since on Abilify  ?Ubrelvy  Trudhesa     Other non-medication therapies or treatments-  - TPIs  - CBD/ THC     Neck pain and description: none  Sleep concerns: sleeps okay      Past Medical History:   Diagnosis Date    Abnormal Pap smear of cervix     Allergic     Anxiety     Arthritis     Depression     Fibromyalgia, primary     GERD (gastroesophageal reflux disease)     Hypertension     IBS (irritable bowel syndrome)     Migraine     Obesity     Vitamin B12 deficiency        Past Surgical History:   Procedure Laterality Date    COLONOSCOPY N/A 5/8/2018    Procedure: COLONOSCOPY;  Surgeon: Stephanie Alston MD;  Location: [facility] GI [facility];  Service: Gastroenterology    CT EXC B9 LESION MRGN XCP SK TG S/N/H/F/G > 4.0CM N/A 7/1/2021    Procedure: EXCISION OF PILAR SCALP CYST X 2 AND RIGHT INNER GROIN NEVVUS;  Surgeon: Denis Barron MD;  Location:  MAIN OR;  Service: Plastics    CT REPAIR COMPLEX SCALP/ARM/LEG 2.6-7.5 CM N/A 7/1/2021    Procedure: CLOSURE WOUND SCALP X 2 AND RIGHT INNER GROIN;  Surgeon: Denis Barron MD;  Location:  MAIN OR;  Service: Plastics    TONSILLECTOMY      WISDOM TOOTH EXTRACTION         Current Outpatient Medications   Medication Sig Dispense Refill    ALPRAZolam (XANAX) 0.5 mg tablet Take 1 tablet (0.5 mg total) by mouth 2 (two) times a day as needed for anxiety 60 tablet 2    ascorbic acid (VITAMIN C) 500 MG tablet Take 500 mg by mouth daily      buPROPion (WELLBUTRIN XL) 300 mg 24 hr tablet Take 1 tablet (300 mg total) by mouth daily  90 tablet 0    Cholecalciferol (Vitamin D3) 50 MCG (2000 UT) capsule Take 1 capsule (2,000 Units total) by mouth daily 90 capsule 1    Cyanocobalamin (Vitamin B-12) 500 MCG SUBL Place 1 tablet (500 mcg total) under the tongue in the morning 30 tablet 2    cyanocobalamin 1,000 mcg/mL 1 IM injection every month 3 mL 3    dicyclomine (BENTYL) 20 mg tablet Take 1 tablet (20 mg total) by mouth 2 (two) times a day 20 tablet 0    Erenumab-aooe (Aimovig) 140 MG/ML SOAJ Inject 140mg (1 pen) under the skin every 30 days. 1 mL 11    escitalopram (LEXAPRO) 20 mg tablet Take 1 tablet (20 mg total) by mouth daily 90 tablet 0    esomeprazole (NexIUM) 40 MG capsule Take 40 mg by mouth      etonogestrel-ethinyl estradiol (NuvaRing) 0.12-0.015 MG/24HR vaginal ring Insert ring for 21 days then remove for one week. 3 each 4    FeroSul 325 (65 Fe) MG tablet Take 1 tablet (325 mg total) by mouth in the morning 90 tablet 1    gabapentin (Neurontin) 300 mg capsule Take 1 capsule (300 mg total) by mouth 3 (three) times a day 90 capsule 5    indomethacin (INDOCIN) 25 mg capsule 1-2 tabs BID prn severe headache with food. Hold toradol. 30 capsule 6    inFLIXimab-axxq (Avsola) 100 mg SOLR Inject 5 mg/kg into a catheter in a vein      loperamide (IMODIUM) 2 mg capsule Take 1 capsule (2 mg total) by mouth 4 (four) times a day as needed for diarrhea 12 capsule 0    magnesium Oxide (MAG-OX) 400 mg TABS Take 1 tablet (400 mg total) by mouth daily 30 tablet 5    methocarbamol (ROBAXIN) 500 mg tablet       mexiletine (MEXITIL) 150 mg capsule Once daily x 1 week, then  capsule 0    mupirocin (BACTROBAN) 2 % ointment Apply topically 2 (two) times a day 15 g 0    OLANZapine (ZyPREXA) 2.5 mg tablet Take 1 tablet (2.5 mg total) by mouth daily Do not take with reglan. 90 tablet 0    onabotulinumtoxin A (BOTOX) 100 units Inject as directed      ondansetron (ZOFRAN) 4 mg tablet Take 1 tablet (4 mg total) by mouth every 6 (six) hours 30 tablet 2     "prazosin (MINIPRESS) 2 mg capsule Take 1 capsule (2 mg total) by mouth daily at bedtime 90 capsule 0    predniSONE 5 mg tablet       Trudhesa 0.725 MG/ACT AERS 1 spray in each nostril for total dose of 1.45mg, may repeat 1 dose after 1 hour. Max 2 doses per 24 hours and 3 doses per week. 12 mL 6    Ascorbic Acid 500 MG CAPS Take 500 mg by mouth daily (Patient not taking: Reported on 1/17/2024)      Cholecalciferol (Vitamin D3) 50 MCG (2000 UT) TABS       diphenoxylate-atropine (LOMOTIL) 2.5-0.025 mg per tablet Take 1 tablet by mouth 4 (four) times a day as needed for diarrhea (Patient not taking: Reported on 2/26/2024) 10 tablet 0    metoprolol succinate (Toprol XL) 25 mg 24 hr tablet Take 1 tablet (25 mg total) by mouth daily 90 tablet 3    nystatin (MYCOSTATIN) 500,000 units/5 mL suspension Apply 5 mL (500,000 Units total) to the mouth or throat 4 (four) times a day (Patient not taking: Reported on 2/26/2024) 473 mL 0    pantoprazole (PROTONIX) 40 mg tablet Take 1 tablet (40 mg total) by mouth daily (Patient not taking: Reported on 3/12/2024) 30 tablet 3    pramipexole (MIRAPEX) 0.25 mg tablet Take 1 tablet (0.25 mg total) by mouth 2 (two) times a day 68 tablet 3     Current Facility-Administered Medications   Medication Dose Route Frequency Provider Last Rate Last Admin    cyanocobalamin injection 1,000 mcg  1,000 mcg Intramuscular Q30 Days Oni Beckham DO   1,000 mcg at 03/14/24 1450        Allergies   Allergen Reactions    Cimzia [Certolizumab Pegol] Swelling    Desvenlafaxine      Other reaction(s): state of confusion    Ixekizumab Vomiting    Seasonal Ic [Octacosanol] Nasal Congestion    Valproic Acid Other (See Comments)     Other reaction(s): dilated pupils, \"schizi\"    Voltaren [Diclofenac] Swelling    Tizanidine Anxiety       Review of Systems   Constitutional:  Negative for appetite change, fatigue and fever.   HENT: Negative.  Negative for hearing loss, tinnitus, trouble swallowing and voice change.  "   Eyes:  Positive for photophobia. Negative for pain and visual disturbance.   Respiratory: Negative.  Negative for shortness of breath.    Cardiovascular: Negative.  Negative for palpitations.   Gastrointestinal:  Positive for nausea. Negative for vomiting.   Endocrine: Negative.  Negative for cold intolerance.   Genitourinary: Negative.  Negative for dysuria, frequency and urgency.   Musculoskeletal:  Negative for back pain, gait problem, myalgias, neck pain and neck stiffness.   Skin: Negative.  Negative for rash.   Allergic/Immunologic: Negative.    Neurological:  Positive for headaches. Negative for dizziness, tremors, seizures, syncope, facial asymmetry, speech difficulty, weakness, light-headedness and numbness.   Hematological: Negative.  Does not bruise/bleed easily.   Psychiatric/Behavioral: Negative.  Negative for confusion, hallucinations and sleep disturbance.      ROS reviewed.      Video Exam    There were no vitals filed for this visit.    Physical Exam   Neurological exam:  On neurologic exam, the patient is alert and oriented to time and place. Speech is fluent and articulate, and the patient follows commands appropriately. Judgment and affect appear normal.  Extraocular muscles are intact without nystagmus. Face is symmetric. Hearing is intact bilaterally. Motor examination reveals adequate range of motion.      Visit Time  Total Visit Duration: 18 minutes

## 2024-03-14 ENCOUNTER — OFFICE VISIT (OUTPATIENT)
Dept: FAMILY MEDICINE CLINIC | Facility: CLINIC | Age: 34
End: 2024-03-14
Payer: COMMERCIAL

## 2024-03-14 VITALS
BODY MASS INDEX: 46.78 KG/M2 | TEMPERATURE: 98.4 F | WEIGHT: 264 LBS | SYSTOLIC BLOOD PRESSURE: 136 MMHG | HEIGHT: 63 IN | HEART RATE: 90 BPM | OXYGEN SATURATION: 98 % | DIASTOLIC BLOOD PRESSURE: 90 MMHG

## 2024-03-14 DIAGNOSIS — I10 PRIMARY HYPERTENSION: ICD-10-CM

## 2024-03-14 DIAGNOSIS — E53.8 VITAMIN B12 DEFICIENCY: ICD-10-CM

## 2024-03-14 DIAGNOSIS — M45.0 ANKYLOSING SPONDYLITIS OF MULTIPLE SITES IN SPINE (HCC): Primary | ICD-10-CM

## 2024-03-14 DIAGNOSIS — G25.81 RESTLESS LEG SYNDROME: ICD-10-CM

## 2024-03-14 DIAGNOSIS — E66.01 MORBID OBESITY WITH BMI OF 40.0-44.9, ADULT (HCC): ICD-10-CM

## 2024-03-14 PROCEDURE — 99214 OFFICE O/P EST MOD 30 MIN: CPT | Performed by: FAMILY MEDICINE

## 2024-03-14 RX ORDER — METOPROLOL SUCCINATE 25 MG/1
25 TABLET, EXTENDED RELEASE ORAL DAILY
Qty: 90 TABLET | Refills: 3 | Status: SHIPPED | OUTPATIENT
Start: 2024-03-14

## 2024-03-14 RX ORDER — CYANOCOBALAMIN 1000 UG/ML
1000 INJECTION, SOLUTION INTRAMUSCULAR; SUBCUTANEOUS
Status: SHIPPED | OUTPATIENT
Start: 2024-03-14

## 2024-03-14 RX ORDER — PRAMIPEXOLE DIHYDROCHLORIDE 0.25 MG/1
0.25 TABLET ORAL 2 TIMES DAILY
Qty: 68 TABLET | Refills: 3 | Status: SHIPPED | OUTPATIENT
Start: 2024-03-14

## 2024-03-14 RX ADMIN — CYANOCOBALAMIN 1000 MCG: 1000 INJECTION, SOLUTION INTRAMUSCULAR; SUBCUTANEOUS at 14:50

## 2024-03-14 NOTE — PROGRESS NOTES
Assessment/Plan: Patient will have B12 injection for B12 deficiency.  Labs reviewed.  X-rays MRI lumbar spine reviewed.  Patient will go for aqua therapy.  Patient will have pramipexole increased to twice daily for restless leg syndrome.  To consider x-ray as well as MRI lumbar spine if lower extremity symptoms persist.  Patient will start metoprolol for hypertension.  Follow-up 3 months       Diagnoses and all orders for this visit:    Ankylosing spondylitis of multiple sites in spine (HCC)    Vitamin B12 deficiency    Morbid obesity with BMI of 40.0-44.9, adult (HCC)    Restless leg syndrome  -     pramipexole (MIRAPEX) 0.25 mg tablet; Take 1 tablet (0.25 mg total) by mouth 2 (two) times a day    Primary hypertension  -     metoprolol succinate (Toprol XL) 25 mg 24 hr tablet; Take 1 tablet (25 mg total) by mouth daily            Subjective:        Patient ID: Cruz Schneider is a 33 y.o. female.      Patient is here to follow-up on hypertension as well as low vitamin B12.  Patient will need placard also for arthritis.  No new exertional chest pain.  No significant shortness of breath.  Headaches are relatively unchanged.  No significant change in urination.  Patient with ongoing restless leg issues.          The following portions of the patient's history were reviewed and updated as appropriate: allergies, current medications, past family history, past medical history, past social history, past surgical history and problem list.      Review of Systems   Constitutional: Negative.    HENT: Negative.     Eyes: Negative.    Respiratory: Negative.     Cardiovascular: Negative.    Gastrointestinal: Negative.    Endocrine: Negative.    Genitourinary: Negative.    Musculoskeletal:  Positive for arthralgias, gait problem and myalgias.   Skin: Negative.    Allergic/Immunologic: Negative.    Neurological:  Positive for headaches.   Hematological: Negative.    Psychiatric/Behavioral: Negative.        "      Objective:        Depression Screening and Follow-up Plan: Clincally patient does not have depression. No treatment is required.             /90 (BP Location: Right arm, Patient Position: Sitting, Cuff Size: Standard)   Pulse 90   Temp 98.4 °F (36.9 °C) (Temporal)   Ht 5' 3\" (1.6 m)   Wt 120 kg (264 lb)   SpO2 98%   BMI 46.77 kg/m²          Physical Exam  Vitals and nursing note reviewed.   Constitutional:       General: She is not in acute distress.     Appearance: Normal appearance. She is not ill-appearing, toxic-appearing or diaphoretic.   HENT:      Head: Normocephalic and atraumatic.      Right Ear: Tympanic membrane, ear canal and external ear normal. There is no impacted cerumen.      Left Ear: Tympanic membrane, ear canal and external ear normal. There is no impacted cerumen.      Nose: Nose normal. No congestion or rhinorrhea.      Mouth/Throat:      Mouth: Mucous membranes are moist.      Pharynx: No oropharyngeal exudate or posterior oropharyngeal erythema.   Eyes:      General: No scleral icterus.        Right eye: No discharge.         Left eye: No discharge.   Neck:      Vascular: No carotid bruit.   Cardiovascular:      Rate and Rhythm: Normal rate and regular rhythm.      Pulses: Normal pulses.      Heart sounds: Normal heart sounds. No murmur heard.     No friction rub. No gallop.   Pulmonary:      Effort: Pulmonary effort is normal. No respiratory distress.      Breath sounds: Normal breath sounds. No stridor. No wheezing, rhonchi or rales.   Chest:      Chest wall: No tenderness.   Musculoskeletal:         General: Tenderness present. No swelling, deformity or signs of injury.      Cervical back: Normal range of motion and neck supple. No rigidity. No muscular tenderness.      Right lower leg: No edema.      Left lower leg: No edema.   Lymphadenopathy:      Cervical: No cervical adenopathy.   Skin:     General: Skin is warm and dry.      Capillary Refill: Capillary refill takes " less than 2 seconds.      Coloration: Skin is not jaundiced.      Findings: No bruising, erythema, lesion or rash.   Neurological:      Mental Status: She is alert and oriented to person, place, and time. Mental status is at baseline.      Gait: Gait abnormal.   Psychiatric:         Mood and Affect: Mood normal.         Behavior: Behavior normal.         Thought Content: Thought content normal.         Judgment: Judgment normal.

## 2024-03-22 ENCOUNTER — TELEMEDICINE (OUTPATIENT)
Dept: BEHAVIORAL/MENTAL HEALTH CLINIC | Facility: CLINIC | Age: 34
End: 2024-03-22
Payer: COMMERCIAL

## 2024-03-22 DIAGNOSIS — F33.1 DEPRESSION, MAJOR, RECURRENT, MODERATE (HCC): Primary | Chronic | ICD-10-CM

## 2024-03-22 DIAGNOSIS — F41.1 GENERALIZED ANXIETY DISORDER: Chronic | ICD-10-CM

## 2024-03-22 PROCEDURE — 90837 PSYTX W PT 60 MINUTES: CPT | Performed by: SOCIAL WORKER

## 2024-03-29 DIAGNOSIS — G43.709 CHRONIC MIGRAINE WITHOUT AURA WITHOUT STATUS MIGRAINOSUS, NOT INTRACTABLE: ICD-10-CM

## 2024-03-29 NOTE — TELEPHONE ENCOUNTER
3/26/24 117 pm    Hi, this is Sb Wade,  [facility] . I'm calling on behalf of MOSHE Mckeon 1990.  She needs a new prescription for her Mexiletine 150  mg  takes one tablet twice a day. If you could send that to me electronically, i would appreciate it. Any questions call me at .     ________________________________    LOV 3/12/24 with Jacki Perez PA-C  Next OV 24  with Jacki Perez PA-C ( next baltazar Botox injection)    Jacki, Please send refill on Mexiletine 150 mg to [facility], med pended. Thank you.

## 2024-03-30 NOTE — TELEPHONE ENCOUNTER
Can I recommend maxalt to the pt, to try temporarily instead of sumatriptan? If this works great, but if not can go back to sumatriptan or will send [facility] again  Thanks  none

## 2024-03-31 NOTE — PSYCH
Virtual Regular Visit    Verification of patient location:    Patient is located at Home in the following state in which I hold an active license [place]      Assessment/Plan:    Problem List Items Addressed This Visit          Behavioral Health    Generalized anxiety disorder (Chronic)    Depression, major, recurrent, moderate (HCC) - Primary (Chronic)       Goals addressed in session: Goal 1          Reason for visit is   Chief Complaint   Patient presents with    Virtual Regular Visit          Encounter provider APARNA Fox    Provider located at [facility] [place]  [facility] [place]  [address]  [place] [address] [phone]      Recent Visits  No visits were found meeting these conditions.  Showing recent visits within past 7 days and meeting all other requirements  Future Appointments  No visits were found meeting these conditions.  Showing future appointments within next 150 days and meeting all other requirements       The patient was identified by name and date of birth. Cruz Schneider was informed that this is a telemedicine visit and that the visit is being conducted throughthe Epic Embedded platform. She agrees to proceed..  My office door was closed. No one else was in the room.  She acknowledged consent and understanding of privacy and security of the video platform. The patient has agreed to participate and understands they can discontinue the visit at any time.    Patient is aware this is a billable service.     Subjective  Cruz Schneider is a 33 y.o. female.      HPI     Past Medical History:   Diagnosis Date    Abnormal Pap smear of cervix     Allergic     Anxiety     Arthritis     Depression     Fibromyalgia, primary     GERD (gastroesophageal reflux disease)     Hypertension     IBS (irritable bowel syndrome)     Migraine     Obesity     Vitamin B12 deficiency        Past Surgical History:   Procedure Laterality Date     COLONOSCOPY N/A 5/8/2018    Procedure: COLONOSCOPY;  Surgeon: Stephanie Alston MD;  Location: [facility] GI [facility];  Service: Gastroenterology    PA EXC B9 LESION MRGN XCP SK TG S/N/H/F/G > 4.0CM N/A 7/1/2021    Procedure: EXCISION OF PILAR SCALP CYST X 2 AND RIGHT INNER GROIN NEVVUS;  Surgeon: Denis Barron MD;  Location:  MAIN OR;  Service: Plastics    PA REPAIR COMPLEX SCALP/ARM/LEG 2.6-7.5 CM N/A 7/1/2021    Procedure: CLOSURE WOUND SCALP X 2 AND RIGHT INNER GROIN;  Surgeon: Denis Barron MD;  Location:  MAIN OR;  Service: Plastics    TONSILLECTOMY      WISDOM TOOTH EXTRACTION         Current Outpatient Medications   Medication Sig Dispense Refill    ALPRAZolam (XANAX) 0.5 mg tablet Take 1 tablet (0.5 mg total) by mouth 2 (two) times a day as needed for anxiety 60 tablet 2    ascorbic acid (VITAMIN C) 500 MG tablet Take 500 mg by mouth daily      Ascorbic Acid 500 MG CAPS Take 500 mg by mouth daily (Patient not taking: Reported on 1/17/2024)      buPROPion (WELLBUTRIN XL) 300 mg 24 hr tablet Take 1 tablet (300 mg total) by mouth daily 90 tablet 0    Cholecalciferol (Vitamin D3) 50 MCG (2000 UT) capsule Take 1 capsule (2,000 Units total) by mouth daily 90 capsule 1    Cholecalciferol (Vitamin D3) 50 MCG (2000 UT) TABS       Cyanocobalamin (Vitamin B-12) 500 MCG SUBL Place 1 tablet (500 mcg total) under the tongue in the morning 30 tablet 2    cyanocobalamin 1,000 mcg/mL 1 IM injection every month 3 mL 3    dicyclomine (BENTYL) 20 mg tablet Take 1 tablet (20 mg total) by mouth 2 (two) times a day 20 tablet 0    diphenoxylate-atropine (LOMOTIL) 2.5-0.025 mg per tablet Take 1 tablet by mouth 4 (four) times a day as needed for diarrhea (Patient not taking: Reported on 2/26/2024) 10 tablet 0    Erenumab-aooe (Aimovig) 140 MG/ML SOAJ Inject 140mg (1 pen) under the skin every 30 days. 1 mL 11    escitalopram (LEXAPRO) 20 mg tablet Take 1 tablet (20 mg total) by mouth daily 90 tablet 0    esomeprazole (NexIUM) 40 MG  capsule Take 40 mg by mouth      etonogestrel-ethinyl estradiol (NuvaRing) 0.12-0.015 MG/24HR vaginal ring Insert ring for 21 days then remove for one week. 3 each 4    FeroSul 325 (65 Fe) MG tablet Take 1 tablet (325 mg total) by mouth in the morning 90 tablet 1    gabapentin (Neurontin) 300 mg capsule Take 1 capsule (300 mg total) by mouth 3 (three) times a day 90 capsule 5    indomethacin (INDOCIN) 25 mg capsule 1-2 tabs BID prn severe headache with food. Hold toradol. 30 capsule 6    inFLIXimab-axxq (Avsola) 100 mg SOLR Inject 5 mg/kg into a catheter in a vein      loperamide (IMODIUM) 2 mg capsule Take 1 capsule (2 mg total) by mouth 4 (four) times a day as needed for diarrhea 12 capsule 0    magnesium Oxide (MAG-OX) 400 mg TABS Take 1 tablet (400 mg total) by mouth daily 30 tablet 5    methocarbamol (ROBAXIN) 500 mg tablet       metoprolol succinate (Toprol XL) 25 mg 24 hr tablet Take 1 tablet (25 mg total) by mouth daily 90 tablet 3    mexiletine (MEXITIL) 150 mg capsule Once daily x 1 week, then  capsule 0    mupirocin (BACTROBAN) 2 % ointment Apply topically 2 (two) times a day 15 g 0    nystatin (MYCOSTATIN) 500,000 units/5 mL suspension Apply 5 mL (500,000 Units total) to the mouth or throat 4 (four) times a day (Patient not taking: Reported on 2/26/2024) 473 mL 0    OLANZapine (ZyPREXA) 2.5 mg tablet Take 1 tablet (2.5 mg total) by mouth daily Do not take with reglan. 90 tablet 0    onabotulinumtoxin A (BOTOX) 100 units Inject as directed      ondansetron (ZOFRAN) 4 mg tablet Take 1 tablet (4 mg total) by mouth every 6 (six) hours 30 tablet 2    pantoprazole (PROTONIX) 40 mg tablet Take 1 tablet (40 mg total) by mouth daily (Patient not taking: Reported on 3/12/2024) 30 tablet 3    pramipexole (MIRAPEX) 0.25 mg tablet Take 1 tablet (0.25 mg total) by mouth 2 (two) times a day 68 tablet 3    prazosin (MINIPRESS) 2 mg capsule Take 1 capsule (2 mg total) by mouth daily at bedtime 90 capsule 0     "predniSONE 5 mg tablet       Trudhesa 0.725 MG/ACT AERS 1 spray in each nostril for total dose of 1.45mg, may repeat 1 dose after 1 hour. Max 2 doses per 24 hours and 3 doses per week. 12 mL 6     Current Facility-Administered Medications   Medication Dose Route Frequency Provider Last Rate Last Admin    cyanocobalamin injection 1,000 mcg  1,000 mcg Intramuscular Q30 Days Oni Beckham, DO   1,000 mcg at 03/14/24 1450        Allergies   Allergen Reactions    Cimzia [Certolizumab Pegol] Swelling    Desvenlafaxine      Other reaction(s): state of confusion    Ixekizumab Vomiting    Seasonal Ic [Octacosanol] Nasal Congestion    Valproic Acid Other (See Comments)     Other reaction(s): dilated pupils, \"schizi\"    Voltaren [Diclofenac] Swelling    Tizanidine Anxiety       Review of Systems    Video Exam    There were no vitals filed for this visit.    Physical Exam     Behavioral Health Psychotherapy Progress Note    Psychotherapy Provided: Individual Psychotherapy     1. Depression, major, recurrent, moderate (HCC)        2. Generalized anxiety disorder            Goals addressed in session: Goal 1     DATA: Met with Stephanie for her scheduled individual session. She reports that her grandmother remains hospitalized. She talked about her relationship with her grandmother and how difficult it will be for her when her grandmother dies. She states that her grandmother is currently being treated for a urinary tract infection and seems to be making some improvements. Stephanie states that she has been feeling increasingly isolative and sad. She states that she has not been doing as much mindful meditation as she has in previous weeks. We discussed the balance between pushing one's self when physically ill versus when feeling anxiety and depression. We discussed using mindfulness-based strategies, getting outside, and engaging in \"opposite action to the emotion\" when she feels like she does not want to be around other people. We " "discussed her relationships with her friend (brittney) and her family members. This clinician again emphasized the importance of increasing her social network, so she does not rely on one person to provide her with all of her needed support.     During this session, this clinician used the following therapeutic modalities: Client-centered Therapy, Dialectical Behavior Therapy, Mindfulness-based Strategies, Motivational Interviewing, Solution-Focused Therapy, and Supportive Psychotherapy    Substance Abuse was not addressed during this session. If the client is diagnosed with a co-occurring substance use disorder, please indicate any changes in the frequency or amount of use: n/a. Stage of change for addressing substance use diagnoses: No substance use/Not applicable    ASSESSMENT:  Stephanie Schneider presents with a Dysthymic mood.     her affect is, which is congruent, with her mood and the content of the session. The client has not made progress on their goals since her last session    Stephanie Schneider presents with a minimal risk of suicide, minimal risk of self-harm, and minimal risk of harm to others.    For any risk assessment that surpasses a \"low\" rating, a safety plan must be developed.    A safety plan was indicated: no  If yes, describe in detail n/a    PLAN: Between sessions, Stephanie Schneider will continue to monitor her moods. She will increase her physical activity as she is able. She will reach out to other friends and acquaintences. At the next session, the therapist will use Client-centered Therapy, Dialectical Behavior Therapy, Mindfulness-based Strategies, Motivational Interviewing, Solution-Focused Therapy, and Supportive Psychotherapy to address her mood regulation and relationship concerns.    Behavioral Health Treatment Plan and Discharge Planning: Stephanie Schneider is aware of and agrees to continue to work on their treatment plan. They have identified and are working toward their discharge goals. " yes    Visit start and stop times:    03/22/24  Start Time: 0903  Stop Time: 0957  Total Visit Time: 54 minutes

## 2024-04-01 RX ORDER — MEXILETINE HYDROCHLORIDE 150 MG/1
150 CAPSULE ORAL 2 TIMES DAILY
Qty: 180 CAPSULE | Refills: 0 | Status: SHIPPED | OUTPATIENT
Start: 2024-04-01

## 2024-04-03 ENCOUNTER — TELEPHONE (OUTPATIENT)
Dept: PSYCHIATRY | Facility: CLINIC | Age: 34
End: 2024-04-03

## 2024-04-03 NOTE — TELEPHONE ENCOUNTER
Patient is calling regarding cancelling an appointment.    Date/Time: 24 at 11am    Reason: Patient had a death in her family and will need to attend the  that day.     Patient was rescheduled: YES [] NO [x]  If yes, when was Patient reschedule for: n/a    Patient requesting call back to reschedule: YES [x] NO []    Apology, forwarded to Therapy Anywhere, please disregard.

## 2024-04-08 ENCOUNTER — TELEPHONE (OUTPATIENT)
Dept: PSYCHIATRY | Facility: CLINIC | Age: 34
End: 2024-04-08

## 2024-04-08 ENCOUNTER — OFFICE VISIT (OUTPATIENT)
Dept: FAMILY MEDICINE CLINIC | Facility: CLINIC | Age: 34
End: 2024-04-08
Payer: COMMERCIAL

## 2024-04-08 VITALS
OXYGEN SATURATION: 98 % | DIASTOLIC BLOOD PRESSURE: 92 MMHG | WEIGHT: 258 LBS | TEMPERATURE: 98.3 F | HEART RATE: 78 BPM | BODY MASS INDEX: 45.71 KG/M2 | HEIGHT: 63 IN | SYSTOLIC BLOOD PRESSURE: 140 MMHG

## 2024-04-08 DIAGNOSIS — L03.031 CELLULITIS OF TOE OF RIGHT FOOT: Primary | ICD-10-CM

## 2024-04-08 PROCEDURE — 99213 OFFICE O/P EST LOW 20 MIN: CPT | Performed by: FAMILY MEDICINE

## 2024-04-08 RX ORDER — DOXYCYCLINE HYCLATE 100 MG/1
100 CAPSULE ORAL EVERY 12 HOURS SCHEDULED
Qty: 20 CAPSULE | Refills: 0 | Status: SHIPPED | OUTPATIENT
Start: 2024-04-08 | End: 2024-04-18

## 2024-04-08 NOTE — TELEPHONE ENCOUNTER
Spoke to patient and canceled 4/8 1130AM due to provider being out of office. Rescheduled for 5/13 1030AM virtual. Patient informed she has had a death in the family and if not dealing with it well. She was going to speak to provider about medication increase on any of her medications. Advised to call if anything changes before rescheduled appt.

## 2024-04-08 NOTE — PROGRESS NOTES
"Assessment/Plan: Patient may use Neosporin as directed.  Patient will use doxycycline as directed.       Diagnoses and all orders for this visit:    Cellulitis of toe of right foot  -     doxycycline hyclate (VIBRAMYCIN) 100 mg capsule; Take 1 capsule (100 mg total) by mouth every 12 (twelve) hours for 10 days            Subjective:        Patient ID: Cruz Schneider is a 33 y.o. female.      Patient is here with redness and swelling of the first toe over the past 3 days.  No drainage.  Patient is using Neosporin.  No fevers noted.    Toe Pain           The following portions of the patient's history were reviewed and updated as appropriate: allergies, current medications, past family history, past medical history, past social history, past surgical history and problem list.      Review of Systems   Constitutional: Negative.    HENT: Negative.     Eyes: Negative.    Respiratory: Negative.     Cardiovascular: Negative.    Gastrointestinal: Negative.    Endocrine: Negative.    Genitourinary: Negative.    Musculoskeletal: Negative.    Skin:  Positive for color change.   Allergic/Immunologic: Negative.    Neurological: Negative.    Hematological: Negative.    Psychiatric/Behavioral: Negative.             Objective:               /92 (BP Location: Right arm, Patient Position: Sitting, Cuff Size: Standard)   Pulse 78   Temp 98.3 °F (36.8 °C) (Temporal)   Ht 5' 3\" (1.6 m)   Wt 117 kg (258 lb)   SpO2 98%   BMI 45.70 kg/m²          Physical Exam  Vitals and nursing note reviewed.   Constitutional:       General: She is not in acute distress.     Appearance: Normal appearance. She is not ill-appearing, toxic-appearing or diaphoretic.   Musculoskeletal:         General: Tenderness present.      Comments: Erythema right first toe.   Neurological:      Mental Status: She is alert.         "

## 2024-04-09 NOTE — TELEPHONE ENCOUNTER
Spoke with Stephanie. Her grandmother passed a week ago. 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 She was there for her last breath in ICU when they couldn't extubate her. Stephanie feels herself going into depression. She knows it's grief, but feels stuck and like it's going to get worse.     Stephanie said Lexapro and Wellbutrin made a big difference when she started them. She is open to increasing either or whatever Dr. Nunn recommends. Her preferred pharmacy would be [facility].

## 2024-04-10 DIAGNOSIS — G43.709 CHRONIC MIGRAINE WITHOUT AURA WITHOUT STATUS MIGRAINOSUS, NOT INTRACTABLE: ICD-10-CM

## 2024-04-10 RX ORDER — OLANZAPINE 5 MG/1
5 TABLET ORAL
Qty: 30 TABLET | Refills: 2 | Status: SHIPPED | OUTPATIENT
Start: 2024-04-10

## 2024-04-10 NOTE — TELEPHONE ENCOUNTER
Called and spoke to Stephanie she  was informed that prescription  for Olanzapine 5 mgwas sent to preferred pharmacy.

## 2024-04-10 NOTE — TELEPHONE ENCOUNTER
Her antidepressants are pretty max out but I can either add 150 mg of Wellbutrin or increase her Olanzapine to 5 mg

## 2024-04-11 DIAGNOSIS — F43.12 CHRONIC POST-TRAUMATIC STRESS DISORDER (PTSD): ICD-10-CM

## 2024-04-11 RX ORDER — PRAZOSIN HYDROCHLORIDE 2 MG/1
2 CAPSULE ORAL
Qty: 90 CAPSULE | Refills: 0 | Status: SHIPPED | OUTPATIENT
Start: 2024-04-11

## 2024-04-11 NOTE — TELEPHONE ENCOUNTER
Medication Refill Request     Name of Medication Prazosin  Dose/Frequency 2 mg/ Take 1 capsule by mouth daily at bedtime.  Quantity 90  Verified pharmacy   [x]  Verified ordering Provider   [x]  Does patient have enough for the next 3 days? Yes [] No [x]  Does patient have a follow-up appointment scheduled? Yes [x] No []   If so when is appointment: 5/13/2024

## 2024-04-15 ENCOUNTER — OFFICE VISIT (OUTPATIENT)
Dept: DENTISTRY | Facility: CLINIC | Age: 34
End: 2024-04-15

## 2024-04-15 VITALS — SYSTOLIC BLOOD PRESSURE: 139 MMHG | TEMPERATURE: 99.7 F | HEART RATE: 102 BPM | DIASTOLIC BLOOD PRESSURE: 92 MMHG

## 2024-04-15 DIAGNOSIS — Z01.20 ENCOUNTER FOR DENTAL EXAMINATION: Primary | ICD-10-CM

## 2024-04-15 DIAGNOSIS — K02.9 CARIES: ICD-10-CM

## 2024-04-15 PROCEDURE — D0150 COMPREHENSIVE ORAL EVALUATION - NEW OR ESTABLISHED PATIENT: HCPCS

## 2024-04-15 PROCEDURE — D0210 INTRAORAL - COMPLETE SERIES OF RADIOGRAPHIC IMAGES: HCPCS

## 2024-04-15 NOTE — DENTAL PROCEDURE DETAILS
Comprehensive Exam    Cruz Schneider 33 y.o. female presents with mom to hospitals for comprehensive exam. Pt was last seen in August 2022, so pt to be reexamined as if she were a new pt in the clinic.  PMH reviewed, no changes, ASA II/III. Significant medical history: ankylosing spondylitis. Significant allergies: medications not prescribed in dentistry. Significant medications: remicade infusions, medication for anxiety/depression.  Pain level 0/10    Chief complaint:   Has several caries, needs a cleaning.    Consent:  Reviewed procedures involved with comprehensive exam including radiographs, oral exam, and periodontal probing.   Patient understands and consents.    Radiographs: FMX    Periodontal exam:  Hygiene - Poor  Plaque - Severe  Horizontal bone loss  UR - None  UL - None  LL - None  LR - None  Vertical bone loss - None  Subgingival calculus - Localized, minimal  BOP - Generalized  Mobility - None  Furcation involvements - None  Occlusal trauma - None  Smoker - No  Diabetic - No  Periodontal Stage: Gingivitis  Periodontal Plan: Prophy    Caries exam:   #2 B, 3 D, 5 D, 6 D (incipient), 9 D, 12 MB and D (incipient), 13 D (incipient), 14 M, 15 B (V), 18 B (V), 19 M, 20 D (incipient), 21 M, 24 B (V), 25 B (V), 26 B (V), 27 DF, 28 M, 31 B (V)  Teeth with high change of needing RCT? #3 possibly when entire existing restoration removed.    Oral cancer screening: No abnormalities detected  Soft tissues exam: Within normal limits  Hard tissues exam: TMJ sometimes sore on opening and closing    Tx plan:  Tx plan able to be determined at comp exam.  At this time pt can still have a prophy for her appropriate type of cleaning.  Caries appear treatable with resins.  Prevident prescribed to help address caries. Pt states she has not found an OTC toothpaste she tolerates well, has tried fluoride free as well as non mint flavors.    Recommended recall schedule: 6 months.    NV: prophy.  NNV: start resins.    Attending:  Dr. Srivastava was present in clinic.

## 2024-04-16 ENCOUNTER — SOCIAL WORK (OUTPATIENT)
Dept: BEHAVIORAL/MENTAL HEALTH CLINIC | Facility: CLINIC | Age: 34
End: 2024-04-16
Payer: COMMERCIAL

## 2024-04-16 DIAGNOSIS — F41.1 GENERALIZED ANXIETY DISORDER: Primary | Chronic | ICD-10-CM

## 2024-04-16 DIAGNOSIS — F33.1 DEPRESSION, MAJOR, RECURRENT, MODERATE (HCC): Chronic | ICD-10-CM

## 2024-04-16 PROCEDURE — 90837 PSYTX W PT 60 MINUTES: CPT | Performed by: SOCIAL WORKER

## 2024-04-19 DIAGNOSIS — Z00.6 ENCOUNTER FOR EXAMINATION FOR NORMAL COMPARISON OR CONTROL IN CLINICAL RESEARCH PROGRAM: ICD-10-CM

## 2024-04-19 DIAGNOSIS — M54.50 BACK PAIN OF THORACOLUMBAR REGION: Primary | ICD-10-CM

## 2024-04-19 DIAGNOSIS — M54.6 BACK PAIN OF THORACOLUMBAR REGION: Primary | ICD-10-CM

## 2024-04-22 ENCOUNTER — OFFICE VISIT (OUTPATIENT)
Dept: FAMILY MEDICINE CLINIC | Facility: CLINIC | Age: 34
End: 2024-04-22
Payer: COMMERCIAL

## 2024-04-22 VITALS
BODY MASS INDEX: 46.6 KG/M2 | SYSTOLIC BLOOD PRESSURE: 120 MMHG | WEIGHT: 263 LBS | HEIGHT: 63 IN | HEART RATE: 83 BPM | DIASTOLIC BLOOD PRESSURE: 74 MMHG | OXYGEN SATURATION: 98 %

## 2024-04-22 DIAGNOSIS — M54.50 CHRONIC LOW BACK PAIN, UNSPECIFIED BACK PAIN LATERALITY, UNSPECIFIED WHETHER SCIATICA PRESENT: ICD-10-CM

## 2024-04-22 DIAGNOSIS — G89.29 CHRONIC LOW BACK PAIN, UNSPECIFIED BACK PAIN LATERALITY, UNSPECIFIED WHETHER SCIATICA PRESENT: ICD-10-CM

## 2024-04-22 DIAGNOSIS — J06.9 UPPER RESPIRATORY TRACT INFECTION, UNSPECIFIED TYPE: Primary | ICD-10-CM

## 2024-04-22 PROCEDURE — 99213 OFFICE O/P EST LOW 20 MIN: CPT | Performed by: FAMILY MEDICINE

## 2024-04-22 RX ORDER — AZITHROMYCIN 250 MG/1
TABLET, FILM COATED ORAL
Qty: 6 TABLET | Refills: 0 | Status: SHIPPED | OUTPATIENT
Start: 2024-04-22 | End: 2024-04-26

## 2024-04-22 NOTE — PROGRESS NOTES
"Assessment/Plan:       Diagnoses and all orders for this visit:    Upper respiratory tract infection, unspecified type  -     azithromycin (ZITHROMAX) 250 mg tablet; Take 2 tablets today then 1 tablet daily x 4 days            Subjective:        Patient ID: Cruz Schneider is a 33 y.o. female.      Patient with nasal congestion as well as discharge.  This been going on for few days.  No fever noted.  No significant sore throat.  Patient using Tylenol patient using Mucinex.          The following portions of the patient's history were reviewed and updated as appropriate: allergies, current medications, past family history, past medical history, past social history, past surgical history and problem list.      Review of Systems   Constitutional: Negative.    HENT:  Positive for congestion, postnasal drip, rhinorrhea, sinus pressure and sinus pain. Negative for sore throat.    Eyes: Negative.    Respiratory: Negative.     Cardiovascular: Negative.    Gastrointestinal: Negative.    Endocrine: Negative.    Genitourinary: Negative.    Musculoskeletal: Negative.    Skin: Negative.    Allergic/Immunologic: Negative.    Neurological:  Positive for headaches.   Hematological: Negative.    Psychiatric/Behavioral: Negative.             Objective:        Tobacco Cessation Counseling: Tobacco cessation counseling was provided. The patient is sincerely urged to quit consumption of tobacco. She is not ready to quit tobacco. Medication options discussed.             /74 (BP Location: Left arm, Patient Position: Sitting, Cuff Size: Large)   Pulse 83   Ht 5' 3\" (1.6 m)   Wt 119 kg (263 lb)   SpO2 98%   BMI 46.59 kg/m²          Physical Exam  Vitals and nursing note reviewed.   Constitutional:       General: She is not in acute distress.     Appearance: She is ill-appearing. She is not toxic-appearing or diaphoretic.   HENT:      Head: Normocephalic and atraumatic.      Right Ear: Tympanic membrane, ear canal and external " ear normal. There is no impacted cerumen.      Left Ear: Tympanic membrane, ear canal and external ear normal. There is no impacted cerumen.      Nose: Rhinorrhea present. No congestion.      Mouth/Throat:      Mouth: Mucous membranes are moist.      Pharynx: Oropharyngeal exudate present. No posterior oropharyngeal erythema.   Eyes:      General: No scleral icterus.        Right eye: No discharge.         Left eye: No discharge.   Neck:      Vascular: No carotid bruit.   Cardiovascular:      Rate and Rhythm: Normal rate and regular rhythm.      Pulses: Normal pulses.      Heart sounds: Normal heart sounds. No murmur heard.     No friction rub. No gallop.   Pulmonary:      Effort: Pulmonary effort is normal. No respiratory distress.      Breath sounds: Normal breath sounds. No stridor. No wheezing, rhonchi or rales.   Chest:      Chest wall: No tenderness.   Abdominal:      Palpations: Abdomen is soft.   Musculoskeletal:         General: No swelling, tenderness, deformity or signs of injury. Normal range of motion.      Cervical back: Normal range of motion and neck supple. No rigidity. No muscular tenderness.      Right lower leg: No edema.      Left lower leg: No edema.   Lymphadenopathy:      Cervical: No cervical adenopathy.   Skin:     General: Skin is warm and dry.      Capillary Refill: Capillary refill takes less than 2 seconds.      Coloration: Skin is not jaundiced.      Findings: No bruising, erythema, lesion or rash.   Neurological:      Mental Status: She is alert and oriented to person, place, and time. Mental status is at baseline.      Cranial Nerves: No cranial nerve deficit.      Sensory: No sensory deficit.      Motor: No weakness.      Coordination: Coordination normal.      Gait: Gait normal.   Psychiatric:         Mood and Affect: Mood normal.         Behavior: Behavior normal.         Thought Content: Thought content normal.         Judgment: Judgment normal.

## 2024-04-22 NOTE — PSYCH
"Behavioral Health Psychotherapy Progress Note    Psychotherapy Provided: Individual Psychotherapy     1. Generalized anxiety disorder        2. Depression, major, recurrent, moderate (HCC)            Goals addressed in session: Goal 1     DATA: Met with Stephanie for her scheduled individual session. Stephanie discussed her grandmother's death and her management of her grief process. Stephanie states that she is having difficulty understanding what is \"normal\" versus not normal re: grief. We discussed her experiences, and this clinician utilized validation and grief therapy processes, to help normalize Stephanie's experiences. Stephanie states that she was present at the time of her grandmother's death. She talked about her role as an advocate for her grandmother and the importance of having people from the medical departments acknowledge their relationships with her grandmother. She talked about her feelings of the death feeling like it's not real. She states that she has \"forgotten\" that her grandmother has  and describes feeling shocked that she could have forgotten. This clinician normalized this experience and allowed her to share as much of the experience as she felt that she needed to. Stephanie states that she fel that her purpose in life was to care for her grandmother, and she states that she feels \"lost.\" She adamantly denies suicidal ideation, but she states that she cannot picture life without her grandmother being present. We discussed how this type of change takes time to fully absorb, and this clinician provided her with support and offered hope that she will find a way to feel that her life has value as she moves forward-- while also encouraging her to give herself the time she needs to move through the grief process.     During this session, this clinician used the following therapeutic modalities: Bereavement Therapy, Client-centered Therapy, Cognitive Behavioral Therapy, Dialectical Behavior Therapy, " "Mindfulness-based Strategies, Motivational Interviewing, Solution-Focused Therapy, and Supportive Psychotherapy    Substance Abuse was not addressed during this session. If the client is diagnosed with a co-occurring substance use disorder, please indicate any changes in the frequency or amount of use: n/a. Stage of change for addressing substance use diagnoses: No substance use/Not applicable    ASSESSMENT:  Stephanie Schneider presents with a Dysthymic mood, which is appropriate to her situation.     her affect is Normal range and intensity, which is congruent, with her mood and the content of the session. The client has made progress on their goals.    Stephanie Schneider presents with a minimal risk of suicide, minimal risk of self-harm, and minimal risk of harm to others.    For any risk assessment that surpasses a \"low\" rating, a safety plan must be developed.    A safety plan was indicated: no  If yes, describe in detail n/a    PLAN: Between sessions, Stephanie Schneider will continue to monitor her moods. She will continue to use her mindfulness-based strategies to manage her emotions. At the next session, the therapist will use Bereavement Therapy, Client-centered Therapy, Dialectical Behavior Therapy, Mindfulness-based Strategies, Motivational Interviewing, Solution-Focused Therapy, and Supportive Psychotherapy to address her mood regulation and grief response.    Behavioral Health Treatment Plan and Discharge Planning: Stephanie Schneider is aware of and agrees to continue to work on their treatment plan. They have identified and are working toward their discharge goals. yes    Visit start and stop times:    04/16/24  Start Time: 1001  Stop Time: 1059  Total Visit Time: 58 minutes  "

## 2024-04-24 DIAGNOSIS — J06.9 UPPER RESPIRATORY TRACT INFECTION, UNSPECIFIED TYPE: Primary | ICD-10-CM

## 2024-04-25 ENCOUNTER — TELEMEDICINE (OUTPATIENT)
Dept: BEHAVIORAL/MENTAL HEALTH CLINIC | Facility: CLINIC | Age: 34
End: 2024-04-25
Payer: COMMERCIAL

## 2024-04-25 DIAGNOSIS — F33.1 DEPRESSION, MAJOR, RECURRENT, MODERATE (HCC): Primary | Chronic | ICD-10-CM

## 2024-04-25 DIAGNOSIS — F43.21 GRIEF REACTION: ICD-10-CM

## 2024-04-25 DIAGNOSIS — F41.1 GENERALIZED ANXIETY DISORDER: Chronic | ICD-10-CM

## 2024-04-25 PROCEDURE — 90834 PSYTX W PT 45 MINUTES: CPT | Performed by: SOCIAL WORKER

## 2024-04-25 RX ORDER — CEFUROXIME AXETIL 500 MG/1
500 TABLET ORAL EVERY 12 HOURS SCHEDULED
Qty: 14 TABLET | Refills: 0 | Status: SHIPPED | OUTPATIENT
Start: 2024-04-25 | End: 2024-05-02

## 2024-04-25 NOTE — PSYCH
Virtual Regular Visit    Verification of patient location:    Patient is located at Home in the following state in which I hold an active license [place]      Assessment/Plan:    Problem List Items Addressed This Visit          Behavioral Health    Generalized anxiety disorder (Chronic)    Depression, major, recurrent, moderate (HCC) - Primary (Chronic)     Other Visit Diagnoses       Grief reaction                Goals addressed in session: Goal 1          Reason for visit is   Chief Complaint   Patient presents with    Virtual Regular Visit          Encounter provider APARNA Fox      Recent Visits  Date Type Provider Dept   04/25/24 Telemedicine APARNA Fox [facility] Therapist [place]   Showing recent visits within past 7 days and meeting all other requirements  Future Appointments  No visits were found meeting these conditions.  Showing future appointments within next 150 days and meeting all other requirements       The patient was identified by name and date of birth. Cruz Schneider was informed that this is a telemedicine visit and that the visit is being conducted throughthe Epic Embedded platform. She agrees to proceed..  My office door was closed. No one else was in the room.  She acknowledged consent and understanding of privacy and security of the video platform. The patient has agreed to participate and understands they can discontinue the visit at any time.    Patient is aware this is a billable service.     Subjective  Cruz Schneider is a 33 y.o. female.      HPI     Past Medical History:   Diagnosis Date    Abnormal Pap smear of cervix     Allergic     Anxiety     Arthritis     Depression     Fibromyalgia, primary     GERD (gastroesophageal reflux disease)     Hypertension     IBS (irritable bowel syndrome)     Migraine     Obesity     Vitamin B12 deficiency        Past Surgical History:   Procedure Laterality Date    COLONOSCOPY N/A 5/8/2018    Procedure: COLONOSCOPY;   Surgeon: Stephanie Altson MD;  Location: [facility];  Service: Gastroenterology    OR EXC B9 LESION MRGN XCP SK TG S/N/H/F/G > 4.0CM N/A 7/1/2021    Procedure: EXCISION OF PILAR SCALP CYST X 2 AND RIGHT INNER GROIN NEVVUS;  Surgeon: Denis Barron MD;  Location:  MAIN OR;  Service: Plastics    OR REPAIR COMPLEX SCALP/ARM/LEG 2.6-7.5 CM N/A 7/1/2021    Procedure: CLOSURE WOUND SCALP X 2 AND RIGHT INNER GROIN;  Surgeon: Denis Barron MD;  Location:  MAIN OR;  Service: Plastics    TONSILLECTOMY      WISDOM TOOTH EXTRACTION         Current Outpatient Medications   Medication Sig Dispense Refill    ALPRAZolam (XANAX) 0.5 mg tablet Take 1 tablet (0.5 mg total) by mouth 2 (two) times a day as needed for anxiety 60 tablet 2    ascorbic acid (VITAMIN C) 500 MG tablet Take 500 mg by mouth daily      buPROPion (WELLBUTRIN XL) 300 mg 24 hr tablet Take 1 tablet (300 mg total) by mouth daily 90 tablet 0    cefuroxime (CEFTIN) 500 mg tablet Take 1 tablet (500 mg total) by mouth every 12 (twelve) hours for 7 days 14 tablet 0    Cholecalciferol (Vitamin D3) 50 MCG (2000 UT) capsule Take 1 capsule (2,000 Units total) by mouth daily 90 capsule 1    Cholecalciferol (Vitamin D3) 50 MCG (2000 UT) TABS       Cyanocobalamin (Vitamin B-12) 500 MCG SUBL Place 1 tablet (500 mcg total) under the tongue in the morning 30 tablet 2    cyanocobalamin 1,000 mcg/mL 1 IM injection every month 3 mL 3    dicyclomine (BENTYL) 20 mg tablet Take 1 tablet (20 mg total) by mouth 2 (two) times a day 20 tablet 0    Erenumab-aooe (Aimovig) 140 MG/ML SOAJ Inject 140mg (1 pen) under the skin every 30 days. 1 mL 11    escitalopram (LEXAPRO) 20 mg tablet Take 1 tablet (20 mg total) by mouth daily 90 tablet 0    esomeprazole (NexIUM) 40 MG capsule Take 40 mg by mouth      etonogestrel-ethinyl estradiol (NuvaRing) 0.12-0.015 MG/24HR vaginal ring Insert ring for 21 days then remove for one week. 3 each 4    FeroSul 325 (65 Fe) MG tablet Take 1 tablet (325 mg  total) by mouth in the morning 90 tablet 1    gabapentin (Neurontin) 300 mg capsule Take 1 capsule (300 mg total) by mouth 3 (three) times a day 90 capsule 5    indomethacin (INDOCIN) 25 mg capsule 1-2 tabs BID prn severe headache with food. Hold toradol. 30 capsule 6    inFLIXimab-axxq (Avsola) 100 mg SOLR Inject 5 mg/kg into a catheter in a vein      loperamide (IMODIUM) 2 mg capsule Take 1 capsule (2 mg total) by mouth 4 (four) times a day as needed for diarrhea 12 capsule 0    magnesium Oxide (MAG-OX) 400 mg TABS Take 1 tablet (400 mg total) by mouth daily 30 tablet 5    methocarbamol (ROBAXIN) 500 mg tablet       metoprolol succinate (Toprol XL) 25 mg 24 hr tablet Take 1 tablet (25 mg total) by mouth daily 90 tablet 3    mexiletine (MEXITIL) 150 mg capsule Take 1 capsule (150 mg total) by mouth 2 (two) times a day 180 capsule 0    mupirocin (BACTROBAN) 2 % ointment Apply topically 2 (two) times a day 15 g 0    OLANZapine (ZyPREXA) 5 mg tablet Take 1 tablet (5 mg total) by mouth daily at bedtime Do not take with reglan. 30 tablet 2    onabotulinumtoxin A (BOTOX) 100 units Inject as directed      ondansetron (ZOFRAN) 4 mg tablet Take 1 tablet (4 mg total) by mouth every 6 (six) hours 30 tablet 2    pramipexole (MIRAPEX) 0.25 mg tablet Take 1 tablet (0.25 mg total) by mouth 2 (two) times a day 68 tablet 3    prazosin (MINIPRESS) 2 mg capsule Take 1 capsule (2 mg total) by mouth daily at bedtime 90 capsule 0    predniSONE 5 mg tablet       Sodium Fluoride 1.1 % PSTE Apply 1 Application to teeth daily at bedtime 100 mL 0    Trudhesa 0.725 MG/ACT AERS 1 spray in each nostril for total dose of 1.45mg, may repeat 1 dose after 1 hour. Max 2 doses per 24 hours and 3 doses per week. 12 mL 6     Current Facility-Administered Medications   Medication Dose Route Frequency Provider Last Rate Last Admin    cyanocobalamin injection 1,000 mcg  1,000 mcg Intramuscular Q30 Days Oni Chapincito, DO   1,000 mcg at 03/14/24 1878       "  Allergies   Allergen Reactions    Cimzia [Certolizumab Pegol] Swelling    Desvenlafaxine      Other reaction(s): state of confusion    Ixekizumab Vomiting    Seasonal Ic [Octacosanol] Nasal Congestion    Valproic Acid Other (See Comments)     Other reaction(s): dilated pupils, \"schizi\"    Voltaren [Diclofenac] Swelling    Tizanidine Anxiety       Review of Systems    Video Exam    There were no vitals filed for this visit.    Physical Exam     Behavioral Health Psychotherapy Progress Note    Psychotherapy Provided: Individual Psychotherapy     1. Depression, major, recurrent, moderate (HCC)        2. Generalized anxiety disorder        3. Grief reaction            Goals addressed in session: Goal 1     DATA: Met with Stephanie for her scheduled individual session. She discussed her grief responses related to the loss of her grandmother. This clinician continues to support her and normalize her reactions. She continues to identify feelings that the situation is not real, and she has times that she \"forgets\" that her grandmother has . She states that they have made a decision to rent out her grandmother's portion of the home to someone that they know. She states that she has been helping her mother to clean out the space. She talked about finding items that hold sentimental value to her, including some letters from her grandfather to her grandmother when he was in [place]. She states that she feels that her mother is \"further along in the process\" of grief then she is. This clinician again normalized her feelings and reinforced that grief is not a linear process and does not follow a particular path. We discussed self care and finding people who can support her. Stephanie states that she has been getting in contact with other friends, as she feels that Domonique is not able to provide her with the support she needs. We will meet again next week, as Stephanie feels that she needs additional support at this time.     During " "this session, this clinician used the following therapeutic modalities: Bereavement Therapy, Client-centered Therapy, Dialectical Behavior Therapy, Mindfulness-based Strategies, Motivational Interviewing, Solution-Focused Therapy, and Supportive Psychotherapy    Substance Abuse was not addressed during this session. If the client is diagnosed with a co-occurring substance use disorder, please indicate any changes in the frequency or amount of use: n/a. Stage of change for addressing substance use diagnoses: No substance use/Not applicable    ASSESSMENT:  Stephanie Schneider presents with a Dysthymic mood.     her affect is Normal range and intensity, which is congruent, with her mood and the content of the session. The client has made progress on their goals.    Stephanie Schneider presents with a minimal risk of suicide, minimal risk of self-harm, and minimal risk of harm to others.    For any risk assessment that surpasses a \"low\" rating, a safety plan must be developed.    A safety plan was indicated: no  If yes, describe in detail n/a    PLAN: Between sessions, Stephanie Schneider will continue to monitor her moods. She will engage with social supports to help her manage her grief. At the next session, the therapist will use Bereavement Therapy, Client-centered Therapy, Dialectical Behavior Therapy, Mindfulness-based Strategies, Motivational Interviewing, Solution-Focused Therapy, and Supportive Psychotherapy to address her grief response, mood regulation, and relationship concerns.    Behavioral Health Treatment Plan and Discharge Planning: Stephanie Schneider is aware of and agrees to continue to work on their treatment plan. They have identified and are working toward their discharge goals. yes    Visit start and stop times:    04/25/24  Start Time: 1307  Stop Time: 1358  Total Visit Time: 51 minutes  "

## 2024-05-03 ENCOUNTER — TELEMEDICINE (OUTPATIENT)
Dept: BEHAVIORAL/MENTAL HEALTH CLINIC | Facility: CLINIC | Age: 34
End: 2024-05-03

## 2024-05-03 ENCOUNTER — TELEPHONE (OUTPATIENT)
Dept: NEUROLOGY | Facility: CLINIC | Age: 34
End: 2024-05-03

## 2024-05-03 DIAGNOSIS — F33.1 DEPRESSION, MAJOR, RECURRENT, MODERATE (HCC): Primary | Chronic | ICD-10-CM

## 2024-05-03 DIAGNOSIS — F41.1 GENERALIZED ANXIETY DISORDER: Chronic | ICD-10-CM

## 2024-05-03 NOTE — TELEPHONE ENCOUNTER
Mauricio  4/30 4:55 PM  Perform specialty pharmacy in regard to patient. Gregvitordonavan waller,. And date of birth 1990. I'm calling about a prior authorization that's needed for the aimovig pen 140  milligrams. We faxed over a form on the 25th of April and we just wanted to see if you received it and are working on it. give us a call back. My number is [phone].

## 2024-05-03 NOTE — BH CRISIS PLAN
Client Name: Stephanie Schneider       Client YOB: 1990    [facility] Safety Plan      Creation Date: 5/3/24 Update Date: 5/3/25   Created By: APARNA Fox Last Updated By: APARNA Fox      Step 1: Warning Signs:   Warning Signs   I get hot and my face gets red   My hearing and vision change (I have a dull ringing in my ears and my vision gets darker)   I start to sweat            Step 2: Internal Coping Strategies:   Internal Coping Strategies   Listen to music-- focus on the lyrics   Medicinal remedies   Spend time with my cat (sometimes)            Step 3: People and social settings that provide distraction:   Name Contact Information   Mom     Places   My room           Step 4: People whom I can ask for help during a crisis:      Name Contact Information    Mom     KP and Alyse       Step 5: Professionals or agencies I can contact during a crisis:      Clinican/Agency Name Phone Emergency Contact    Ria Moreno [phone]     Dr. Nunn [phone]     Adan Beckham (PCP) in my phone       Local Emergency Department Emergency Department Phone Emergency Department Address    [facility]          Crisis Phone Numbers:   Suicide Prevention Lifeline: Call or Text  819 Crisis Text Line: Text HOME to 976-978   Please note: Some [facility] do not have a separate number for Child/Adolescent specific crisis. If your county is not listed under Child/Adolescent, please call the adult number for your county      Adult Crisis Numbers: Child/Adolescent Crisis Numbers   [facility]: [phone] [facility]: [phone]   [facility]: [phone] [facility]: [phone]   [facility]: [phone] [place]: [phone]   [facility]: [phone] [place] [facility]: [phone]   [place] [facility]: [phone]   [facility]: [phone]   [facility]: [phone]   [facility]: [phone] (daytime) [phone]  (after hours, weekends, holidays)      Step 6: Making the environment safer (plan for lethal means safety):   Plan: No lethal means.      Optional: What is most important to me and worth living for?   My mom.      [facility]othy Safety Plan. Laura Degroot and Oni Galan. Used with permission of the authors.

## 2024-05-03 NOTE — BH TREATMENT PLAN
Outpatient Behavioral Health Psychotherapy Treatment Plan    Stephanie Schneider  1990     Date of Initial Psychotherapy Assessment: June 26, 2019     Date of Current Treatment Plan: 05/03/2024  Treatment Plan Target Date: 08/31/2024  Treatment Plan Expiration Date: 10/30/2024    Diagnosis:   1. Depression, major, recurrent, moderate (HCC)        2. Generalized anxiety disorder          Area(s) of Need: Mood regulation; relationships; physical health issues    Long Term Goal 1 (in the client's own words): I want to improve my physical health and continue to work on managing my anxiety and depression.     Stage of Change: Action    Target Date for completion: May 3, 2025     Anticipated therapeutic modalities: client-centered therapy; dialectical behavior therapy; motivational interviewing     People identified to complete this goal: Stephanie (client); Shanna Moreno (psychotherapist); Dr. Nunn (psychiatrist)      Objective 1: (identify the means of measuring success in meeting the objective): Stephanie will meet with Dr. Nunn for medication management sessions. She will maintain adherence with her medication regimen. She reports no significant side effects at this time. She currently reports missing 1-2 doses of medications occasionally--primarily due to her medical issues and levels of fatigue.    Update May 3, 2024: Stephanie continues to meet with Dr. Nunn for medication management. Stephanie identifies that she has recently struggled to take her medications regularly. She states that she is feeling overwhelmed by the grief from her grandmother's death and feels that there is a lot to do in her day. She states that she is having difficulty motivating herself to take her medications. We will discuss techniques to help her remember and be more motivated to take her medications. We will examine the barriers that are impacting her motivation to maintain adherence with her medications and work to overcome the  barriers that are currently impacting her.       Objective 2: (identify the means of measuring success in meeting the objective): Stephanie will participate in DBT-informed therapy-- to learn and practice a minimum of three distress-tolerance skills. She will discuss successes and barriers in her therapy sessions.    Update May 3, 2024: Stephanie states that she wants to increase her use of mindfulness-based strategies. She states that she finds mindfulness to be positive for her overall wellbeing. We will spend time, during sessions, working on identifying various mindfulness techniques to increase her ability to practice these skills. She identifies that she is struggling to practice these skills since the death of her grandmother. She is able to identify the benefits to mindfulness. Her goal is to practice at least once per day.       Objective 3: (identify the means of measuring success in meeting the objective): Stephanie will practice DBT-informed effective communication skills. She will continue to set limits/boundaries with friends/family and will discuss successes and barriers in her therapy sessions.    Update May 3, 2024: Stephanie states that she feels that she is doing better with setting boundaries with her friend, Domonique. Stephanie states that she is backing off from talking with Domonique-- because she feels that Domonique does not really listen to her. At this point, Domonique is the relationship that Stephanie is struggling with the most.       Objective 4: (identify the means of measuring success in meeting the objective): Stephanie will discuss medical issues and impact on MH symptoms. She will continue to find a balance between pushing her physical activity level and managing overall emotional and physical fatigue. We will discuss successes and barriers in her therapy sessions.    Update May 3, 2024: Stephanie continues to focus on her medical health issues. She continues to follow up with her providers and takes the direction of  her doctors. We will continue to process her balance of medical issues and her self-care routine.       Objective 5: (identify the means of measuring success in meeting the objective): Stephanie will process the death of her grandmother and will work through this process. We will discuss her progress in her therapy sessions.     New objective as of May 3, 2024.     I am currently under the care of a [facility] psychiatric provider: yes    My [facility] psychiatric provider is: Dr. Aliyah Ghosh    I am currently taking psychiatric medications: Yes, as prescribed    I feel that I will be ready for discharge from mental health care when I reach the following (measurable goal/objective): I will not have as much anxiety as I approach an event or an activity. I will just be able to do it, without having to over plan.     For children and adults who have a legal guardian:   Has there been any change to custody orders and/or guardianship status? NA. If yes, attach updated documentation.    Crisis plan was updated during this session.     Behavioral Health Treatment Plan [facility]: Diagnosis and Treatment Plan explained to Stephanie SERGIO Garzay SERGIO Schneider acknowledges an understanding of their diagnosis. Stephaniebrannon Schneider agrees to this treatment plan (consent provided via email on November 10, 2023). The treatment plan is being sent to the client, via the [employer] platform. This clinician will check for the signature at the next individual session.     I have been offered a copy of this Treatment Plan. Yes (via [employer]

## 2024-05-03 NOTE — PSYCH
Virtual Regular Visit    Verification of patient location:    Patient is located at Home in the following state in which I hold an active license [place]      Assessment/Plan:    Problem List Items Addressed This Visit          Behavioral Health    Generalized anxiety disorder (Chronic)    Depression, major, recurrent, moderate (HCC) - Primary (Chronic)       Goals addressed in session: Goal 1          Reason for visit is   Chief Complaint   Patient presents with    Virtual Regular Visit          Encounter provider APARNA Fox      Recent Visits  No visits were found meeting these conditions.  Showing recent visits within past 7 days and meeting all other requirements  Today's Visits  Date Type Provider Dept   05/03/24 Telemedicine APARNA Fox [facility] Therapist [place]   Showing today's visits and meeting all other requirements  Future Appointments  No visits were found meeting these conditions.  Showing future appointments within next 150 days and meeting all other requirements       The patient was identified by name and date of birth. Cruz Schneider was informed that this is a telemedicine visit and that the visit is being conducted throughthe Epic Embedded platform. She agrees to proceed..  My office door was closed. No one else was in the room.  She acknowledged consent and understanding of privacy and security of the video platform. The patient has agreed to participate and understands they can discontinue the visit at any time.    Patient is aware this is a billable service.     Subjective  Cruz Schneider is a 33 y.o. female.      HPI     Past Medical History:   Diagnosis Date    Abnormal Pap smear of cervix     Allergic     Anxiety     Arthritis     Depression     Fibromyalgia, primary     GERD (gastroesophageal reflux disease)     Hypertension     IBS (irritable bowel syndrome)     Migraine     Obesity     Vitamin B12 deficiency        Past Surgical History:   Procedure  Laterality Date    COLONOSCOPY N/A 5/8/2018    Procedure: COLONOSCOPY;  Surgeon: Stephanie Alston MD;  Location: [facility];  Service: Gastroenterology    AR EXC B9 LESION MRGN XCP SK TG S/N/H/F/G > 4.0CM N/A 7/1/2021    Procedure: EXCISION OF PILAR SCALP CYST X 2 AND RIGHT INNER GROIN NEVVUS;  Surgeon: Denis Barron MD;  Location:  MAIN OR;  Service: Plastics    AR REPAIR COMPLEX SCALP/ARM/LEG 2.6-7.5 CM N/A 7/1/2021    Procedure: CLOSURE WOUND SCALP X 2 AND RIGHT INNER GROIN;  Surgeon: Denis Barron MD;  Location:  MAIN OR;  Service: Plastics    TONSILLECTOMY      WISDOM TOOTH EXTRACTION         Current Outpatient Medications   Medication Sig Dispense Refill    ALPRAZolam (XANAX) 0.5 mg tablet Take 1 tablet (0.5 mg total) by mouth 2 (two) times a day as needed for anxiety 60 tablet 2    ascorbic acid (VITAMIN C) 500 MG tablet Take 500 mg by mouth daily      buPROPion (WELLBUTRIN XL) 300 mg 24 hr tablet Take 1 tablet (300 mg total) by mouth daily 90 tablet 0    Cholecalciferol (Vitamin D3) 50 MCG (2000 UT) capsule Take 1 capsule (2,000 Units total) by mouth daily 90 capsule 1    Cholecalciferol (Vitamin D3) 50 MCG (2000 UT) TABS       Cyanocobalamin (Vitamin B-12) 500 MCG SUBL Place 1 tablet (500 mcg total) under the tongue in the morning 30 tablet 2    cyanocobalamin 1,000 mcg/mL 1 IM injection every month 3 mL 3    dicyclomine (BENTYL) 20 mg tablet Take 1 tablet (20 mg total) by mouth 2 (two) times a day 20 tablet 0    Erenumab-aooe (Aimovig) 140 MG/ML SOAJ Inject 140mg (1 pen) under the skin every 30 days. 1 mL 11    escitalopram (LEXAPRO) 20 mg tablet Take 1 tablet (20 mg total) by mouth daily 90 tablet 0    esomeprazole (NexIUM) 40 MG capsule Take 40 mg by mouth      etonogestrel-ethinyl estradiol (NuvaRing) 0.12-0.015 MG/24HR vaginal ring Insert ring for 21 days then remove for one week. 3 each 4    FeroSul 325 (65 Fe) MG tablet Take 1 tablet (325 mg total) by mouth in the morning 90 tablet 1     gabapentin (Neurontin) 300 mg capsule Take 1 capsule (300 mg total) by mouth 3 (three) times a day 90 capsule 5    indomethacin (INDOCIN) 25 mg capsule 1-2 tabs BID prn severe headache with food. Hold toradol. 30 capsule 6    inFLIXimab-axxq (Avsola) 100 mg SOLR Inject 5 mg/kg into a catheter in a vein      loperamide (IMODIUM) 2 mg capsule Take 1 capsule (2 mg total) by mouth 4 (four) times a day as needed for diarrhea 12 capsule 0    magnesium Oxide (MAG-OX) 400 mg TABS Take 1 tablet (400 mg total) by mouth daily 30 tablet 5    methocarbamol (ROBAXIN) 500 mg tablet       metoprolol succinate (Toprol XL) 25 mg 24 hr tablet Take 1 tablet (25 mg total) by mouth daily 90 tablet 3    mexiletine (MEXITIL) 150 mg capsule Take 1 capsule (150 mg total) by mouth 2 (two) times a day 180 capsule 0    mupirocin (BACTROBAN) 2 % ointment Apply topically 2 (two) times a day 15 g 0    OLANZapine (ZyPREXA) 5 mg tablet Take 1 tablet (5 mg total) by mouth daily at bedtime Do not take with reglan. 30 tablet 2    onabotulinumtoxin A (BOTOX) 100 units Inject as directed      ondansetron (ZOFRAN) 4 mg tablet Take 1 tablet (4 mg total) by mouth every 6 (six) hours 30 tablet 2    pramipexole (MIRAPEX) 0.25 mg tablet Take 1 tablet (0.25 mg total) by mouth 2 (two) times a day 68 tablet 3    prazosin (MINIPRESS) 2 mg capsule Take 1 capsule (2 mg total) by mouth daily at bedtime 90 capsule 0    predniSONE 5 mg tablet       Sodium Fluoride 1.1 % PSTE Apply 1 Application to teeth daily at bedtime 100 mL 0    Trudhesa 0.725 MG/ACT AERS 1 spray in each nostril for total dose of 1.45mg, may repeat 1 dose after 1 hour. Max 2 doses per 24 hours and 3 doses per week. 12 mL 6     Current Facility-Administered Medications   Medication Dose Route Frequency Provider Last Rate Last Admin    cyanocobalamin injection 1,000 mcg  1,000 mcg Intramuscular Q30 Days Oni Beckham DO   1,000 mcg at 03/14/24 1450        Allergies   Allergen Reactions    Cimzia  "[Certolizumab Pegol] Swelling    Desvenlafaxine      Other reaction(s): state of confusion    Ixekizumab Vomiting    Seasonal Ic [Octacosanol] Nasal Congestion    Valproic Acid Other (See Comments)     Other reaction(s): dilated pupils, \"schizi\"    Voltaren [Diclofenac] Swelling    Tizanidine Anxiety       Review of Systems    Video Exam    There were no vitals filed for this visit.    Physical Exam     Behavioral Health Psychotherapy Progress Note    Psychotherapy Provided: Individual Psychotherapy     1. Depression, major, recurrent, moderate (HCC)        2. Generalized anxiety disorder            Goals addressed in session: Goal 1     DATA: Met with Stephanie for her scheduled individual session. She states, \"It's a lot.\" She discussed the progress that she and her mother have made with cleaning out her grandmother's home. Stephanie states that she continues to feel like her grandmother's death is not real. She states that she feels like her grandmother is still in the hospital. This clinician validated her experience and worked to normalize her response to her grandmother's death. Stephanie discussed her fears about her medical health, now that she is no longer on medications for her rheumatological condition. She states that she is struggling to take her medications-- \"I feel so exhausted. I feel burned out.\" This clinician worked with her to develop strategies to motivate her and remind her to take her medications. Stephanie states that she is frustrated with Domonique-- because Domonique told her that she [Domonique] is \"too depressed to deal with anything.\" Domonique also told Stephanie that she, \"doesn't like depressed Stephanie.\" This clinician offered support and continued to encourage her to interact with other friends, as much as possible. We spent time reviewing and revising Stephanie's treatment plan. She states that she wants to remain focused on the same goals -- improving physical and mental health-- but would like to focus more on " "the practice of mindfulness. She also completed her crisis plan.     During this session, this clinician used the following therapeutic modalities: Bereavement Therapy, Client-centered Therapy, Dialectical Behavior Therapy, Mindfulness-based Strategies, Motivational Interviewing, Solution-Focused Therapy, and Supportive Psychotherapy    Substance Abuse was not addressed during this session. If the client is diagnosed with a co-occurring substance use disorder, please indicate any changes in the frequency or amount of use: n/a. Stage of change for addressing substance use diagnoses: No substance use/Not applicable    ASSESSMENT:  Stephanie Schneider presents with a Dysthymic mood.     her affect is Normal range and intensity, which is congruent, with her mood and the content of the session. The client has made progress on their goals.    Stephanie Schneider presents with a minimal risk of suicide, minimal risk of self-harm, and minimal risk of harm to others.    For any risk assessment that surpasses a \"low\" rating, a safety plan must be developed.    A safety plan was indicated: no  If yes, describe in detail n/a    PLAN: Between sessions, Stephanie Schneider will continue to monitor her moods. She will continue to utilize her social supports to help her manage her grief response. At the next session, the therapist will use Bereavement Therapy, Client-centered Therapy, Dialectical Behavior Therapy, Mindfulness-based Strategies, Motivational Interviewing, Solution-Focused Therapy, and Supportive Psychotherapy to address her mood regulation and relationship concerns.    Behavioral Health Treatment Plan and Discharge Planning: Stephanie Schneider is aware of and agrees to continue to work on their treatment plan. They have identified and are working toward their discharge goals. yes    Visit start and stop times:    05/03/24  Start Time: 0904  Stop Time: 1003  Total Visit Time: 59 minutes        "

## 2024-05-06 NOTE — QUICK NOTE
If patient has no improvement in migrainous pain and is still 4 to 5/10 in regards to her pain, patient is to get 0 5 mg of DHE as agreed upon (will need to let nursing now to give Reglan and Benadryl right before DHE administration)    If patient tolerated of 30 minutes of getting DHE  can get an additional 1 mg afterwards
normal...

## 2024-05-08 ENCOUNTER — TELEPHONE (OUTPATIENT)
Dept: NEUROLOGY | Facility: CLINIC | Age: 34
End: 2024-05-08

## 2024-05-08 NOTE — TELEPHONE ENCOUNTER
Called [facility] and scheduled delivery with Jolanta for:     Botox-200 Units  Qty:1  Delivery to [place]  Scheduled for 05/09/2024  Via: [employer]     Please advise if medication doesn't arrive.

## 2024-05-08 NOTE — TELEPHONE ENCOUNTER
Mauricio  5/3 3:40 PM    [facility] calling in regards to romulo waller, 1990.  Aimovig 140 mg is requiring a PA.  We sent a fax to the doctor's office and we are following up again.  And once the office has a status update, if they can kindly call us back at [phone], would greatly appreciate it.

## 2024-05-09 NOTE — TELEPHONE ENCOUNTER
Called [facility] and confirmed with Jolanta that the scheduled delivery is tomorrow for:     Botox-200 Units  Qty:1  Delivery to [place]  Scheduled for 05/10/2024  Via: [employer]     Please advise if medication doesn't arrive.

## 2024-05-10 ENCOUNTER — TELEMEDICINE (OUTPATIENT)
Dept: BEHAVIORAL/MENTAL HEALTH CLINIC | Facility: CLINIC | Age: 34
End: 2024-05-10
Payer: COMMERCIAL

## 2024-05-10 DIAGNOSIS — F33.1 DEPRESSION, MAJOR, RECURRENT, MODERATE (HCC): Primary | Chronic | ICD-10-CM

## 2024-05-10 DIAGNOSIS — F41.1 GENERALIZED ANXIETY DISORDER: Chronic | ICD-10-CM

## 2024-05-10 PROCEDURE — 90837 PSYTX W PT 60 MINUTES: CPT | Performed by: SOCIAL WORKER

## 2024-05-10 NOTE — TELEPHONE ENCOUNTER
Botox number of units: 200   Botox quantity: 1  Arrived at what location: [facility]   Botox at [facility] Administer[facility] Location: Yes  NDC number:0548-8150-42  Lot number: [id number]  Expiration Date: 10/2026  Appt notes indicate correct medication: Yes

## 2024-05-13 ENCOUNTER — TELEPHONE (OUTPATIENT)
Dept: PSYCHIATRY | Facility: CLINIC | Age: 34
End: 2024-05-13

## 2024-05-13 ENCOUNTER — TELEMEDICINE (OUTPATIENT)
Dept: PSYCHIATRY | Facility: CLINIC | Age: 34
End: 2024-05-13

## 2024-05-13 ENCOUNTER — TELEPHONE (OUTPATIENT)
Dept: NEUROLOGY | Facility: CLINIC | Age: 34
End: 2024-05-13

## 2024-05-13 DIAGNOSIS — F41.0 PANIC DISORDER WITHOUT AGORAPHOBIA: ICD-10-CM

## 2024-05-13 DIAGNOSIS — F41.1 GENERALIZED ANXIETY DISORDER: Chronic | ICD-10-CM

## 2024-05-13 DIAGNOSIS — F33.1 DEPRESSION, MAJOR, RECURRENT, MODERATE (HCC): Primary | Chronic | ICD-10-CM

## 2024-05-13 RX ORDER — ALPRAZOLAM 0.5 MG/1
0.5 TABLET ORAL 3 TIMES DAILY PRN
Qty: 90 TABLET | Refills: 2 | Status: SHIPPED | OUTPATIENT
Start: 2024-05-13

## 2024-05-13 RX ORDER — ONABOTULINUMTOXINA 200 [USP'U]/1
INJECTION, POWDER, LYOPHILIZED, FOR SOLUTION INTRADERMAL; INTRAMUSCULAR
COMMUNITY
Start: 2024-05-06

## 2024-05-13 RX ORDER — ALPRAZOLAM 0.5 MG/1
0.5 TABLET ORAL 3 TIMES DAILY PRN
Qty: 60 TABLET | Refills: 2 | Status: SHIPPED | OUTPATIENT
Start: 2024-05-13 | End: 2024-05-13 | Stop reason: SDUPTHER

## 2024-05-13 RX ORDER — BUPROPION HYDROCHLORIDE 300 MG/1
300 TABLET ORAL DAILY
Qty: 90 TABLET | Refills: 0 | Status: SHIPPED | OUTPATIENT
Start: 2024-05-13

## 2024-05-13 RX ORDER — ESCITALOPRAM OXALATE 20 MG/1
20 TABLET ORAL DAILY
Qty: 90 TABLET | Refills: 0 | Status: SHIPPED | OUTPATIENT
Start: 2024-05-13

## 2024-05-13 NOTE — PSYCH
Virtual Regular Visit    Verification of patient location:    Patient is located at Home in the following state in which I hold an active license [place]      Assessment/Plan:    Problem List Items Addressed This Visit          Behavioral Health    Depression, major, recurrent, moderate (HCC) - Primary (Chronic)    Generalized anxiety disorder (Chronic)    Panic disorder without agoraphobia                 Tobacco Cessation Counseling: Tobacco cessation counseling was provided. The patient is sincerely urged to quit consumption of tobacco. She is not ready to quit tobacco.       Reason for visit is   No chief complaint on file.       Encounter provider Aliyah Keating MD    Provider located at [address] [address] [phone]      Recent Visits  No visits were found meeting these conditions.  Showing recent visits within past 7 days and meeting all other requirements  Today's Visits  Date Type Provider Dept   05/13/24 Telemedicine Aliyah Keating MD  [facility]   Showing today's visits and meeting all other requirements  Future Appointments  No visits were found meeting these conditions.  Showing future appointments within next 150 days and meeting all other requirements       The patient was identified by name and date of birth. Cruz Schneider was informed that this is a telemedicine visit and that the visit is being conducted throughthe Epic Embedded platform. She agrees to proceed..  My office door was closed. No one else was in the room.  She acknowledged consent and understanding of privacy and security of the video platform. The patient has agreed to participate and understands they can discontinue the visit at any time.    Patient is aware this is a billable service.     Subjective  Cruz Schneider is a 33 y.o. female with MDD,Panic do, BESSY .Stephanie remains compliant with  her medications and denies side effects. No recent health changes or new medications.  She continues to meet with her counselor on a regular basis.      Patient stated she has been dealing with a lot of physical pain due to ankylosing spondylitis and plantar fasciitis.   She is also taking medical cannabis which helps with her anxiety and also with her chronic pain.  Since last seen she stated her GM passed after multiple health complications. She has been helping her mother cope with the loss. They did not celebrated Mother's Day yesterday. She also increased frequency of counseling to once a week. She will consider joining bereavement group later on. Her anxiety has been high and agrees to have Alprazolam increased to 0.5 mg po tid temporarily.     She agrees to continue  the rest of her treatment  and will schedule follow up in 3 months or sooner if needed.        HPI     Past Medical History:   Diagnosis Date    Abnormal Pap smear of cervix     Allergic     Anxiety     Arthritis     Depression     Fibromyalgia, primary     GERD (gastroesophageal reflux disease)     Hypertension     IBS (irritable bowel syndrome)     Migraine     Obesity     Vitamin B12 deficiency        Past Surgical History:   Procedure Laterality Date    COLONOSCOPY N/A 5/8/2018    Procedure: COLONOSCOPY;  Surgeon: Stephanie Alston MD;  Location: [facility];  Service: Gastroenterology    IA EXC B9 LESION MRGN XCP SK TG S/N/H/F/G > 4.0CM N/A 7/1/2021    Procedure: EXCISION OF PILAR SCALP CYST X 2 AND RIGHT INNER GROIN NEVVUS;  Surgeon: Denis Barron MD;  Location:  MAIN OR;  Service: Plastics    IA REPAIR COMPLEX SCALP/ARM/LEG 2.6-7.5 CM N/A 7/1/2021    Procedure: CLOSURE WOUND SCALP X 2 AND RIGHT INNER GROIN;  Surgeon: Denis Barron MD;  Location:  MAIN OR;  Service: Plastics    TONSILLECTOMY      WISDOM TOOTH EXTRACTION         Current Outpatient Medications   Medication Sig Dispense Refill    Botox 200 units SOLR       ALPRAZolam  (XANAX) 0.5 mg tablet Take 1 tablet (0.5 mg total) by mouth 2 (two) times a day as needed for anxiety 60 tablet 2    ascorbic acid (VITAMIN C) 500 MG tablet Take 500 mg by mouth daily      buPROPion (WELLBUTRIN XL) 300 mg 24 hr tablet Take 1 tablet (300 mg total) by mouth daily 90 tablet 0    Cholecalciferol (Vitamin D3) 50 MCG (2000 UT) capsule Take 1 capsule (2,000 Units total) by mouth daily 90 capsule 1    Cholecalciferol (Vitamin D3) 50 MCG (2000 UT) TABS       Cyanocobalamin (Vitamin B-12) 500 MCG SUBL Place 1 tablet (500 mcg total) under the tongue in the morning 30 tablet 2    cyanocobalamin 1,000 mcg/mL 1 IM injection every month 3 mL 3    dicyclomine (BENTYL) 20 mg tablet Take 1 tablet (20 mg total) by mouth 2 (two) times a day 20 tablet 0    Erenumab-aooe (Aimovig) 140 MG/ML SOAJ Inject 140mg (1 pen) under the skin every 30 days. 1 mL 11    escitalopram (LEXAPRO) 20 mg tablet Take 1 tablet (20 mg total) by mouth daily 90 tablet 0    esomeprazole (NexIUM) 40 MG capsule Take 40 mg by mouth      etonogestrel-ethinyl estradiol (NuvaRing) 0.12-0.015 MG/24HR vaginal ring Insert ring for 21 days then remove for one week. 3 each 4    FeroSul 325 (65 Fe) MG tablet Take 1 tablet (325 mg total) by mouth in the morning 90 tablet 1    gabapentin (Neurontin) 300 mg capsule Take 1 capsule (300 mg total) by mouth 3 (three) times a day 90 capsule 5    indomethacin (INDOCIN) 25 mg capsule 1-2 tabs BID prn severe headache with food. Hold toradol. 30 capsule 6    inFLIXimab-axxq (Avsola) 100 mg SOLR Inject 5 mg/kg into a catheter in a vein      loperamide (IMODIUM) 2 mg capsule Take 1 capsule (2 mg total) by mouth 4 (four) times a day as needed for diarrhea 12 capsule 0    magnesium Oxide (MAG-OX) 400 mg TABS Take 1 tablet (400 mg total) by mouth daily 30 tablet 5    methocarbamol (ROBAXIN) 500 mg tablet       metoprolol succinate (Toprol XL) 25 mg 24 hr tablet Take 1 tablet (25 mg total) by mouth daily 90 tablet 3     "mexiletine (MEXITIL) 150 mg capsule Take 1 capsule (150 mg total) by mouth 2 (two) times a day 180 capsule 0    mupirocin (BACTROBAN) 2 % ointment Apply topically 2 (two) times a day 15 g 0    OLANZapine (ZyPREXA) 5 mg tablet Take 1 tablet (5 mg total) by mouth daily at bedtime Do not take with reglan. 30 tablet 2    onabotulinumtoxin A (BOTOX) 100 units Inject as directed      ondansetron (ZOFRAN) 4 mg tablet Take 1 tablet (4 mg total) by mouth every 6 (six) hours 30 tablet 2    pramipexole (MIRAPEX) 0.25 mg tablet Take 1 tablet (0.25 mg total) by mouth 2 (two) times a day 68 tablet 3    prazosin (MINIPRESS) 2 mg capsule Take 1 capsule (2 mg total) by mouth daily at bedtime 90 capsule 0    predniSONE 5 mg tablet       Sodium Fluoride 1.1 % PSTE Apply 1 Application to teeth daily at bedtime 100 mL 0    Trudhesa 0.725 MG/ACT AERS 1 spray in each nostril for total dose of 1.45mg, may repeat 1 dose after 1 hour. Max 2 doses per 24 hours and 3 doses per week. 12 mL 6     Current Facility-Administered Medications   Medication Dose Route Frequency Provider Last Rate Last Admin    cyanocobalamin injection 1,000 mcg  1,000 mcg Intramuscular Q30 Days Oni Beckham DO   1,000 mcg at 03/14/24 1450        Allergies   Allergen Reactions    Cimzia [Certolizumab Pegol] Swelling    Desvenlafaxine      Other reaction(s): state of confusion    Ixekizumab Vomiting    Seasonal Ic [Octacosanol] Nasal Congestion    Valproic Acid Other (See Comments)     Other reaction(s): dilated pupils, \"schizi\"    Voltaren [Diclofenac] Swelling    Tizanidine Anxiety       Review of Systems    Mood Anxiety and Depression   Behavior Normal    Thought Content Disturbing Thoughts, Feelings   General Emotional Problems and Decreased Functioning   Personality Normal   Other Psych Symptoms Normal   Constitutional Negative   ENT Negative   Cardiovascular Negative   Respiratory Negative   Gastrointestinal Negative   Genitourinary Negative   Musculoskeletal " Negative   Integumentary Negative   Neurological Negative   Endocrine Normal    Other Symptoms Normal        Laboratory Results:   Recent Labs (last 12 months):   Office Visit on 03/01/2024   Component Date Value    Color, UA 03/01/2024 Yellow     Clarity, UA 03/01/2024 Clear     Specific Gravity, UA 03/01/2024 1.020     pH, UA 03/01/2024 6.5     Leukocytes, UA 03/01/2024 Moderate (A)     Nitrite, UA 03/01/2024 Negative     Protein, UA 03/01/2024 Trace (A)     Glucose, UA 03/01/2024 Negative     Ketones, UA 03/01/2024 Negative     Urobilinogen, UA 03/01/2024 <2.0     Bilirubin, UA 03/01/2024 Negative     Occult Blood, UA 03/01/2024 Negative     Urine Culture 03/01/2024 10,000-19,000 cfu/ml     RBC, UA 03/01/2024 1-2     WBC, UA 03/01/2024 10-20 (A)     Epithelial Cells 03/01/2024 Moderate (A)     Bacteria, UA 03/01/2024 None Seen     MUCUS THREADS 03/01/2024 Innumerable (A)    Office Visit on 02/26/2024   Component Date Value    SARS-CoV-2 02/26/2024 Negative     INFLUENZA A PCR 02/26/2024 Negative     INFLUENZA B PCR 02/26/2024 Negative    Office Visit on 02/01/2024   Component Date Value     RAPID STREP A 02/01/2024 Negative     Throat Culture 02/01/2024 Negative for beta-hemolytic Streptococcus    Admission on 01/28/2024, Discharged on 01/28/2024   Component Date Value    SARS-CoV-2 01/28/2024 Positive (A)     INFLUENZA A PCR 01/28/2024 Negative     INFLUENZA B PCR 01/28/2024 Negative     RSV PCR 01/28/2024 Negative    Appointment on 12/30/2023   Component Date Value    TSH 3RD GENERATON 12/30/2023 1.163     CRP 12/30/2023 7.9 (H)    Admission on 12/24/2023, Discharged on 12/24/2023   Component Date Value    WBC 12/24/2023 6.89     RBC 12/24/2023 4.18     Hemoglobin 12/24/2023 12.5     Hematocrit 12/24/2023 37.5     MCV 12/24/2023 90     MCH 12/24/2023 29.9     MCHC 12/24/2023 33.3     RDW 12/24/2023 13.8     MPV 12/24/2023 9.1     Platelets 12/24/2023 357     nRBC 12/24/2023 0     Segmented % 12/24/2023 44      Immature Grans % 12/24/2023 0     Lymphocytes % 12/24/2023 46 (H)     Monocytes % 12/24/2023 7     Eosinophils Relative 12/24/2023 3     Basophils Relative 12/24/2023 0     Absolute Neutrophils 12/24/2023 3.05     Absolute Immature Grans 12/24/2023 0.01     Absolute Lymphocytes 12/24/2023 3.16     Absolute Monocytes 12/24/2023 0.45     Eosinophils Absolute 12/24/2023 0.20     Basophils Absolute 12/24/2023 0.02     Sodium 12/24/2023 136     Potassium 12/24/2023 3.8     Chloride 12/24/2023 104     CO2 12/24/2023 22     ANION GAP 12/24/2023 10     BUN 12/24/2023 7     Creatinine 12/24/2023 0.65     Glucose 12/24/2023 103     Calcium 12/24/2023 8.9     eGFR 12/24/2023 117     Lipase 12/24/2023 18     Color, UA 12/24/2023 Yellow     Clarity, UA 12/24/2023 Slightly Cloudy (A)     Specific Gravity, UA 12/24/2023 >=1.030 (H)     pH, UA 12/24/2023 6.0     Leukocytes, UA 12/24/2023 Trace (A)     Nitrite, UA 12/24/2023 Negative     Protein, UA 12/24/2023 Trace (A)     Glucose, UA 12/24/2023 Negative     Ketones, UA 12/24/2023 Trace (A)     Urobilinogen, UA 12/24/2023 0.2     Bilirubin, UA 12/24/2023 1+ (A)     Occult Blood, UA 12/24/2023 Negative     EXT Preg Test, Ur 12/24/2023 Negative     Control 12/24/2023 Valid     RBC, UA 12/24/2023 1-2     WBC, UA 12/24/2023 10-20 (A)     Epithelial Cells 12/24/2023 Moderate (A)     Bacteria, UA 12/24/2023 Occasional     MUCUS THREADS 12/24/2023 Moderate (A)     Urine Culture 12/24/2023 40,000-49,000 cfu/ml    Admission on 11/21/2023, Discharged on 11/21/2023   Component Date Value    EXT Preg Test, Ur 11/21/2023 Negative     Control 11/21/2023 Valid     SARS-CoV-2 11/21/2023 Negative     INFLUENZA A PCR 11/21/2023 Negative     INFLUENZA B PCR 11/21/2023 Negative     RSV PCR 11/21/2023 Negative    Annual Exam on 11/14/2023   Component Date Value    Case Report 11/14/2023                      Value:Gynecologic Cytology Report                       Case: [id number]                                   Authorizing Provider:  Ignacia López MD  Collected:           11/14/2023 1519              Ordering Location:     [facility]  Received:            11/14/2023 1519                                     [facility]                                                           First Screen:          Leela Finn, CT                                                       Rescreen:              Hien Santacruz                                                                  Specimen:    LIQUID-BASED PAP, SCREENING, Cervix, Endocervical                                          Primary Interpretation 11/14/2023 Negative for intraepithelial lesion or malignancy     Specimen Adequacy 11/14/2023 Satisfactory for evaluation. Endocervical/transformation zone component present.     Additional Information 11/14/2023                      Value:This result contains rich text formatting which cannot be displayed here.    Trichomonas vaginalis 11/14/2023 Not Detected     Gardnerella vaginalis 11/14/2023 Not Detected     Candida Species 11/14/2023 Not Detected     HPV Other HR 11/14/2023 Negative     HPV16 11/14/2023 Negative     HPV18 11/14/2023 Negative    Office Visit on 10/17/2023   Component Date Value     RAPID STREP A 10/17/2023 Negative     Throat Culture 10/17/2023 1 colony Beta Hemolytic Streptococcus NOT Group A, C, or G (A)     POCT SARS-CoV-2 Ag 10/17/2023 Negative     VALID CONTROL 10/17/2023 Valid    Office Visit on 09/20/2023   Component Date Value    Supplier Name 09/20/2023 [employer]     Supplier Phone Number 09/20/2023 [phone]     Order Status 09/20/2023 Delivery Successful     Delivery Request Date 09/20/2023 09/20/2023     Date Delivered  09/20/2023 09/21/2023     Item Description 09/20/2023 Shower Chair (With Back) [$]    There may be more visits with results that are not included.     Substance Abuse History:  Social History     Substance and  Sexual Activity   Drug Use Yes    Frequency: 2.0 times per week    Types: Marijuana    Comment: medical marijuana (vape)       Family Psychiatric History:   Family History   Problem Relation Age of Onset    Rheum arthritis Mother     Psoriasis Mother     Other Mother     Hypertension Mother     Diabetes unspecified Mother     Sjogren's syndrome Mother     Alcohol abuse Mother     Drug abuse Mother     Anxiety disorder Father     Alcohol abuse Father     Lung cancer Maternal Grandfather     Cancer Other         bone    Diabetes Other     Other Other         High blood pressure    Depression Maternal Grandmother        The following portions of the patient's history were reviewed and updated as appropriate: allergies, current medications, past family history, past medical history, past social history, past surgical history and problem list.    Social History     Socioeconomic History    Marital status: Single     Spouse name: Not on file    Number of children: 0    Years of education: 12    Highest education level: Not on file   Occupational History    Occupation: Unemployed     Employer: [employer]   Tobacco Use    Smoking status: Never     Passive exposure: Past    Smokeless tobacco: Never   Vaping Use    Vaping status: Some Days    Start date: 7/1/2019    Substances: THC, CBD   Substance and Sexual Activity    Alcohol use: Not Currently     Comment: rarely    Drug use: Yes     Frequency: 2.0 times per week     Types: Marijuana     Comment: medical marijuana (vape)    Sexual activity: Not Currently     Partners: Male     Comment: Nuva ring   Other Topics Concern    Not on file   Social History Narrative    Home:  Living with mom and MGM        Education:    Pt denies any h/o learning disability Dxs but admits to having great difficulty in math requiring a .  She reached childhood milestones on time as far as he knows.    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 On reflection, she feels it was moreso her anxiety as the cause of her leaving school, and she was using the other reason as an excuse so she would not have to admit to anxiety at that time.  She is now very open about her anxiety and does not mind that I have this information in the general social section of her chart.     Social Determinants of Health     Financial Resource Strain: Low Risk  (8/16/2022)    Overall Financial Resource Strain (CARDIA)     Difficulty of Paying Living Expenses: Not hard at all   Food Insecurity: No Food Insecurity (8/16/2022)    Hunger Vital Sign     Worried About Running Out of Food in the Last Year: Never true     Ran Out of Food in the Last Year: Never true   Transportation Needs: No Transportation Needs (8/16/2022)    PRAPARE - Transportation     Lack of Transportation (Medical): No     Lack of Transportation (Non-Medical): No   Physical Activity: Unknown (2/11/2021)    Exercise Vital Sign     Days of Exercise per Week: 0 days     Minutes of Exercise per Session: Not on file   Stress: Not on file   Social Connections: Not on file   Intimate Partner Violence: Not on file   Housing Stability: Not on file     Social History     Social History Narrative    Home:  Living with mom and MGM        Education:    Pt denies any h/o learning disability Dxs but admits to having great difficulty in math requiring a .  She reached childhood milestones on time as far as he knows.    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 On reflection, she feels it was moreso her anxiety as the cause of her leaving school, and she  was using the other reason as an excuse so she would not have to admit to anxiety at that time.  She is now very open about her anxiety and does not mind that I have this information in the general social section of her chart.       Objective:       Mental status:  Appearance calm and cooperative , adequate hygiene and grooming and good eye contact    Mood dysphoric   Affect affect was constricted   Speech a normal rate and fluent   Thought Processes coherent/organized and normal thought processes   Hallucinations no hallucinations present    Thought Content no delusions   Abnormal Thoughts no suicidal thoughts  and no homicidal thoughts    Orientation  oriented to person and place and time   Remote Memory short term memory intact and long term memory intact   Attention Span concentration intact   Intellect Appears to be of Average Intelligence   Insight Limited insight   Judgement judgment was limited   Muscle Strength n/a   Language no difficulty naming common objects and no difficulty repeating a phrase    Fund of Knowledge displays adequate knowledge of current events, adequate fund of knowledge regarding past history and adequate fund of knowledge regarding vocabulary                Assessment/Plan:       Diagnoses and all orders for this visit:    Depression, major, recurrent, moderate (HCC)    Panic disorder without agoraphobia    Generalized anxiety disorder    Other orders  -     Botox 200 units SOLR                Treatment Recommendations- Risks Benefits      Immediate Medical/Psychiatric/Psychotherapy Treatments and Any Precautions: as stated on HPI     Risks, Benefits And Possible Side Effects Of Medications:  {PSYCH RISK, BENEFITS AND POSSIBLE SIDE EFFECTS (Optional):56648    Controlled Medication Discussion: Discussed with patient Black Box warning on concurrent use of benzodiazepines and opioid medications including sedation, respiratory depression, coma and death. Patient understands the risk of  treatment with benzodiazepines in addition to opioids and wants to continue taking those medications. , Discussed with patient the risks of sedation, respiratory depression, impairment of ability to drive and potential for abuse and addiction related to treatment with benzodiazepine medications. The patient understands risk of treatment with benzodiazepine medications, agrees to not drive if feels impaired and agrees to take medications as prescribed. and The patient has been filling controlled prescriptions on time as prescribed to [place] Prescription Drug Monitoring program.      Psychotherapy Provided: Individual psychotherapy provided.     Individual psychotherapy provided: Yes  Counseling was provided during the session today for 16 minutes.  Medications, treatment progress and treatment plan reviewed with Cruz.  Medication education provided to Cruz.  Goals discussed during in session: improve control of anxiety and improve control of depression.   Recent stressor including relationship problems, health issues, medical problems, limited support, social difficulties, everyday stressors, ongoing anxiety and chronic mental illness discussed with Cruz.   Coping strategies including compliance with medications, contacting a therapist, deep/slow breathing, eliminating avoidance, engaging in previously avoided activities, exercising, getting into a good routine, improving self-esteem, increasing energy, increasing interest in usual activities, increasing motivation, increasing social interaction, keeping busy at home, maintain healthy diet, maintain heathy sleeping hygiene and maintain positive attitude reviewed with Cruz.   Importance of medication and treatment compliance reviewed with Cruz.  Educated on importance of medication and treatment compliance.  Importance of follow up with family physician for medical issues reviewed with Cruz.  Discussed with Cruz acceptance of  mental illness diagnosis and need for ongoing psychiatric treatment.  Supportive therapy provided.                  Visit Time    Visit Start Time: 11:00  Visit Stop Time: 11:30  Total Visit Duration:  30 minutes

## 2024-05-13 NOTE — TELEPHONE ENCOUNTER
The pharmacist from [facility] called in stating they received 2 different does prescriptions for Alprazolam. They are requesting a clinical yohan back to clarify which prescription to fill for the patient.

## 2024-05-13 NOTE — TELEPHONE ENCOUNTER
Nurse spoke with pharmacist and verified correct script is:    Correct one is 0.5 mg po tid prn 90

## 2024-05-13 NOTE — TELEPHONE ENCOUNTER
Submitted & Received the following Auth-Approval info via fax from "[employer] for:    Approved: Under Pharmacy Benefits.   Botox-200 units. QTY:1, Q3 Months.  DX: G43.709, Chronic Migraine.   REF# [id number] (Member ID#)  Valid: 05/02/2024 until 05/02/2025  4 Visits    Please use [facility].

## 2024-05-14 ENCOUNTER — PROCEDURE VISIT (OUTPATIENT)
Dept: NEUROLOGY | Facility: CLINIC | Age: 34
End: 2024-05-14
Payer: COMMERCIAL

## 2024-05-14 VITALS — SYSTOLIC BLOOD PRESSURE: 131 MMHG | HEART RATE: 83 BPM | DIASTOLIC BLOOD PRESSURE: 74 MMHG | TEMPERATURE: 97.9 F

## 2024-05-14 DIAGNOSIS — G43.709 CHRONIC MIGRAINE WITHOUT AURA WITHOUT STATUS MIGRAINOSUS, NOT INTRACTABLE: Primary | ICD-10-CM

## 2024-05-14 PROCEDURE — 64615 CHEMODENERV MUSC MIGRAINE: CPT | Performed by: PHYSICIAN ASSISTANT

## 2024-05-14 RX ORDER — GALCANEZUMAB 120 MG/ML
INJECTION, SOLUTION SUBCUTANEOUS
Qty: 2 ML | Refills: 0 | Status: SHIPPED | OUTPATIENT
Start: 2024-05-14

## 2024-05-14 RX ORDER — GALCANEZUMAB 120 MG/ML
INJECTION, SOLUTION SUBCUTANEOUS
Qty: 1 ML | Refills: 11 | Status: SHIPPED | OUTPATIENT
Start: 2024-05-14

## 2024-05-14 NOTE — PROGRESS NOTES
Universal Protocol   Consent: Verbal consent obtained. Written consent obtained.  Risks and benefits: risks, benefits and alternatives were discussed  Consent given by: patient  Patient understanding: patient states understanding of the procedure being performed  Patient consent: the patient's understanding of the procedure matches consent given  Procedure consent: procedure consent matches procedure scheduled      Chemodenervation     Date/Time  5/14/2024 8:00 AM     Performed by  Jacki Perez PA-C   Authorized by  Jacki Perez PA-C     Pre-procedure details      Prepped With: Alcohol     Procedure details      Position:  Upright   Botox      Botox Type:  Type A    Brand:  Botox    mL's of Botulinum Toxin:  200    Final Concentration per CC:  100 units    Needle Gauge:  30 G 2.5 inch   Procedures      Botox Procedures: chronic headache      Indications: migraines     Injection Location      Head / Face:  L superior trapezius, R superior trapezius, L superior cervical paraspinal, R superior cervical paraspinal, L , R , procerus, L temporalis, R temporalis, R frontalis, L frontalis, R medial occipitalis and L medial occipitalis    L  injection amount:  5 unit(s)    R  injection amount:  5 unit(s)    L lateral frontalis:  5 unit(s)    R lateral frontalis:  5 unit(s)    L medial frontalis:  5 unit(s)    R medial frontalis:  5 unit(s)    L temporalis injection amount:  20 unit(s)    R temporalis injection amount:  20 unit(s)    Procerus injection amount:  5 unit(s)    L medial occipitalis injection amount:  15 unit(s)    R medial occipitalis injection amount:  15 unit(s)    L superior cervical paraspinal injection amount:  10 unit(s)    R superior cervical paraspinal injection amount:  10 unit(s)    L superior trapezius injection amount:  0 unit(s)    R superior trapezius injection amount:  0 unit(s)   Total Units      Total units used:  200    Total units discarded:  0    Post-procedure details      Chemodenervation:  Chronic migraine    Facial Nerve Location::  Bilateral facial nerve    Patient tolerance of procedure:  Tolerated well, no immediate complications   Comments        Medically necessary:  - 30 units between the R and L headband region  - 35 units between each anterior temporalis and scalp  - 5 each masseter (10 total)  Avoided traps b/l.  Cold spray used.       Blood pressure 131/74, pulse 83, temperature 97.9 °F (36.6 °C), temperature source Temporal, not currently breastfeeding.    Hold aimovig, trial emgality, s/e reviewed.

## 2024-05-17 ENCOUNTER — OFFICE VISIT (OUTPATIENT)
Dept: FAMILY MEDICINE CLINIC | Facility: CLINIC | Age: 34
End: 2024-05-17
Payer: COMMERCIAL

## 2024-05-17 ENCOUNTER — TELEMEDICINE (OUTPATIENT)
Dept: BEHAVIORAL/MENTAL HEALTH CLINIC | Facility: CLINIC | Age: 34
End: 2024-05-17
Payer: COMMERCIAL

## 2024-05-17 VITALS
SYSTOLIC BLOOD PRESSURE: 106 MMHG | HEIGHT: 63 IN | BODY MASS INDEX: 46.6 KG/M2 | TEMPERATURE: 98.3 F | DIASTOLIC BLOOD PRESSURE: 72 MMHG | OXYGEN SATURATION: 98 % | WEIGHT: 263 LBS | HEART RATE: 88 BPM

## 2024-05-17 DIAGNOSIS — F33.1 DEPRESSION, MAJOR, RECURRENT, MODERATE (HCC): Primary | Chronic | ICD-10-CM

## 2024-05-17 DIAGNOSIS — F41.1 GENERALIZED ANXIETY DISORDER: Chronic | ICD-10-CM

## 2024-05-17 DIAGNOSIS — S90.211A SUBUNGUAL HEMATOMA OF GREAT TOE OF RIGHT FOOT, INITIAL ENCOUNTER: Primary | ICD-10-CM

## 2024-05-17 PROBLEM — S60.10XA SUBUNGUAL HEMATOMA OF DIGIT OF HAND: Status: ACTIVE | Noted: 2024-05-17

## 2024-05-17 PROCEDURE — 90837 PSYTX W PT 60 MINUTES: CPT | Performed by: SOCIAL WORKER

## 2024-05-17 PROCEDURE — 99213 OFFICE O/P EST LOW 20 MIN: CPT | Performed by: FAMILY MEDICINE

## 2024-05-17 NOTE — PROGRESS NOTES
"Assessment/Plan: Patient may use warm compresses as well as Neosporin as directed.  Patient will follow with dermatology if not seeing improvement by 1 month.       Diagnoses and all orders for this visit:    Subungual hematoma of great toe of right foot, initial encounter            Subjective:        Patient ID: Cruz Schneider is a 33 y.o. female.      Patient is here dark area right first toenail laterally.  No direct trauma noted by patient.  No significant pain or redness.  Patient is using Neosporin.          The following portions of the patient's history were reviewed and updated as appropriate: allergies, current medications, past family history, past medical history, past social history, past surgical history and problem list.      Review of Systems   Constitutional: Negative.    HENT: Negative.     Eyes: Negative.    Respiratory: Negative.     Cardiovascular: Negative.    Gastrointestinal: Negative.    Endocrine: Negative.    Genitourinary: Negative.    Musculoskeletal: Negative.    Skin:  Positive for color change.   Allergic/Immunologic: Negative.    Neurological: Negative.    Hematological: Negative.    Psychiatric/Behavioral: Negative.             Objective:        Depression Screening and Follow-up Plan: Patient was screened for depression during today's encounter. They screened negative with a PHQ-9 score of 0.            /72 (BP Location: Right arm, Patient Position: Sitting, Cuff Size: Standard)   Pulse 88   Temp 98.3 °F (36.8 °C) (Temporal)   Ht 5' 3\" (1.6 m)   Wt 119 kg (263 lb)   LMP  (LMP Unknown)   SpO2 98%   BMI 46.59 kg/m²          Physical Exam  Vitals and nursing note reviewed.   Constitutional:       Appearance: Normal appearance.   Skin:     Findings: Bruising present.      Comments: Subungual hematoma right first toe laterally.   Neurological:      Mental Status: She is alert.   Psychiatric:         Mood and Affect: Mood normal.         Behavior: Behavior normal.        "  Thought Content: Thought content normal.         Judgment: Judgment normal.

## 2024-05-17 NOTE — PSYCH
Virtual Regular Visit    Verification of patient location:    Patient is located at Home in the following state in which I hold an active license [place]      Assessment/Plan:    Problem List Items Addressed This Visit          Behavioral Health    Generalized anxiety disorder (Chronic)    Depression, major, recurrent, moderate (HCC) - Primary (Chronic)       Goals addressed in session: Goal 1          Reason for visit is   Chief Complaint   Patient presents with    Virtual Regular Visit          Encounter provider APARNA Fox      Recent Visits  No visits were found meeting these conditions.  Showing recent visits within past 7 days and meeting all other requirements  Future Appointments  No visits were found meeting these conditions.  Showing future appointments within next 150 days and meeting all other requirements       The patient was identified by name and date of birth. Cruz Schneider was informed that this is a telemedicine visit and that the visit is being conducted throughthe Epic Embedded platform. She agrees to proceed..  My office door was closed. No one else was in the room.  She acknowledged consent and understanding of privacy and security of the video platform. The patient has agreed to participate and understands they can discontinue the visit at any time.    Patient is aware this is a billable service.     Subjective  Cruz Schneider is a 33 y.o. female.      HPI     Past Medical History:   Diagnosis Date    Abnormal Pap smear of cervix     Allergic     Anxiety     Arthritis     Depression     Fibromyalgia, primary     GERD (gastroesophageal reflux disease)     Hypertension     IBS (irritable bowel syndrome)     Migraine     Obesity     Vitamin B12 deficiency        Past Surgical History:   Procedure Laterality Date    COLONOSCOPY N/A 5/8/2018    Procedure: COLONOSCOPY;  Surgeon: Stephanie Alston MD;  Location: [facility];  Service: Gastroenterology    VT EXC B9 LESION MRGN XCP SK TG  S/N/H/F/G > 4.0CM N/A 7/1/2021    Procedure: EXCISION OF PILAR SCALP CYST X 2 AND RIGHT INNER GROIN NEVVUS;  Surgeon: Denis Barron MD;  Location:  MAIN OR;  Service: Plastics    CT REPAIR COMPLEX SCALP/ARM/LEG 2.6-7.5 CM N/A 7/1/2021    Procedure: CLOSURE WOUND SCALP X 2 AND RIGHT INNER GROIN;  Surgeon: Denis Barron MD;  Location:  MAIN OR;  Service: Plastics    TONSILLECTOMY      WISDOM TOOTH EXTRACTION         Current Outpatient Medications   Medication Sig Dispense Refill    ALPRAZolam (XANAX) 0.5 mg tablet Take 1 tablet (0.5 mg total) by mouth 3 (three) times a day as needed for anxiety 90 tablet 2    ascorbic acid (VITAMIN C) 500 MG tablet Take 500 mg by mouth daily      Botox 200 units SOLR       buPROPion (WELLBUTRIN XL) 300 mg 24 hr tablet Take 1 tablet (300 mg total) by mouth daily 90 tablet 0    Cholecalciferol (Vitamin D3) 50 MCG (2000 UT) capsule Take 1 capsule (2,000 Units total) by mouth daily 90 capsule 1    Cholecalciferol (Vitamin D3) 50 MCG (2000 UT) TABS       Cyanocobalamin (Vitamin B-12) 500 MCG SUBL Place 1 tablet (500 mcg total) under the tongue in the morning 30 tablet 2    cyanocobalamin 1,000 mcg/mL 1 IM injection every month 3 mL 3    dicyclomine (BENTYL) 20 mg tablet Take 1 tablet (20 mg total) by mouth 2 (two) times a day 20 tablet 0    escitalopram (LEXAPRO) 20 mg tablet Take 1 tablet (20 mg total) by mouth daily 90 tablet 0    esomeprazole (NexIUM) 40 MG capsule Take 40 mg by mouth      etonogestrel-ethinyl estradiol (NuvaRing) 0.12-0.015 MG/24HR vaginal ring Insert ring for 21 days then remove for one week. 3 each 4    FeroSul 325 (65 Fe) MG tablet Take 1 tablet (325 mg total) by mouth in the morning 90 tablet 1    gabapentin (Neurontin) 300 mg capsule Take 1 capsule (300 mg total) by mouth 3 (three) times a day 90 capsule 5    Galcanezumab-gnlm (Emgality) 120 MG/ML SOAJ One ml subcutaneous on the right thigh and 1 ml subcutaneous on the left thigh at the same time for 1  dose 2 mL 0    Galcanezumab-gnlm (Emgality) 120 MG/ML SOAJ 30 days after loading dose, inject 1 pen subq every 30 days. 1 mL 11    indomethacin (INDOCIN) 25 mg capsule 1-2 tabs BID prn severe headache with food. Hold toradol. 30 capsule 6    inFLIXimab-axxq (Avsola) 100 mg SOLR Inject 5 mg/kg into a catheter in a vein      loperamide (IMODIUM) 2 mg capsule Take 1 capsule (2 mg total) by mouth 4 (four) times a day as needed for diarrhea 12 capsule 0    magnesium Oxide (MAG-OX) 400 mg TABS Take 1 tablet (400 mg total) by mouth daily 30 tablet 5    methocarbamol (ROBAXIN) 500 mg tablet       metoprolol succinate (Toprol XL) 25 mg 24 hr tablet Take 1 tablet (25 mg total) by mouth daily (Patient not taking: Reported on 5/17/2024) 90 tablet 3    mexiletine (MEXITIL) 150 mg capsule Take 1 capsule (150 mg total) by mouth 2 (two) times a day 180 capsule 0    mupirocin (BACTROBAN) 2 % ointment Apply topically 2 (two) times a day 15 g 0    OLANZapine (ZyPREXA) 5 mg tablet Take 1 tablet (5 mg total) by mouth daily at bedtime Do not take with reglan. 30 tablet 2    onabotulinumtoxin A (BOTOX) 100 units Inject as directed      ondansetron (ZOFRAN) 4 mg tablet Take 1 tablet (4 mg total) by mouth every 6 (six) hours 30 tablet 2    pramipexole (MIRAPEX) 0.25 mg tablet Take 1 tablet (0.25 mg total) by mouth 2 (two) times a day 68 tablet 3    prazosin (MINIPRESS) 2 mg capsule Take 1 capsule (2 mg total) by mouth daily at bedtime 90 capsule 0    predniSONE 5 mg tablet       Sodium Fluoride 1.1 % PSTE Apply 1 Application to teeth daily at bedtime 100 mL 0    Trudhesa 0.725 MG/ACT AERS 1 spray in each nostril for total dose of 1.45mg, may repeat 1 dose after 1 hour. Max 2 doses per 24 hours and 3 doses per week. 12 mL 6     Current Facility-Administered Medications   Medication Dose Route Frequency Provider Last Rate Last Admin    cyanocobalamin injection 1,000 mcg  1,000 mcg Intramuscular Q30 Days Oni Beckham DO   1,000 mcg at  "03/14/24 1450        Allergies   Allergen Reactions    Cimzia [Certolizumab Pegol] Swelling    Desvenlafaxine      Other reaction(s): state of confusion    Ixekizumab Vomiting    Seasonal Ic [Octacosanol] Nasal Congestion    Valproic Acid Other (See Comments)     Other reaction(s): dilated pupils, \"schizi\"    Voltaren [Diclofenac] Swelling    Tizanidine Anxiety       Review of Systems    Video Exam    There were no vitals filed for this visit.    Physical Exam     Behavioral Health Psychotherapy Progress Note    Psychotherapy Provided: Individual Psychotherapy     1. Depression, major, recurrent, moderate (HCC)        2. Generalized anxiety disorder            Goals addressed in session: Goal 1     DATA: Met with Stephanie for her scheduled individual session. She states, \"There was a murder in my town.\" She states that she knew the men who committed the crime, because she went to high school with them. She moved on to the topic of grief re: the loss of her grandmother, and the transition of moving belongings into her part of the house. She states that, because her mother was caring for her grandmother over the past two years, Stephanie has lived by herself in their part of the house. She states that she is struggling with some OCD-like symptoms, now that her mother is bringing things back into their side of the house. We discussed Stephanie's emotional responses and her hypercriticism toward her feelings/behaviors. This clinician validated her experiences and encouraged her to allow herself to experience whatever emotions are coming up for her. She denies any suicidal ideation or concerns related to self-harm. She endorses strong support from family members. She is planning to dog/house-sit for friends next week, and she is looking forward to being in a different physical environment.     During this session, this clinician used the following therapeutic modalities: Bereavement Therapy, Client-centered Therapy, Dialectical " "Behavior Therapy, Mindfulness-based Strategies, Motivational Interviewing, Solution-Focused Therapy, and Supportive Psychotherapy    Substance Abuse was not addressed during this session. If the client is diagnosed with a co-occurring substance use disorder, please indicate any changes in the frequency or amount of use: n/a. Stage of change for addressing substance use diagnoses: No substance use/Not applicable    ASSESSMENT:  Stephanie Schneider presents with a Euthymic/ normal mood.     her affect is Normal range and intensity, which is congruent, with her mood and the content of the session. The client has made progress on their goals.    Stephanie Schneider presents with a minimal risk of suicide, minimal risk of self-harm, and minimal risk of harm to others.    For any risk assessment that surpasses a \"low\" rating, a safety plan must be developed.    A safety plan was indicated: no  If yes, describe in detail n/a    PLAN: Between sessions, Stephanie Schneider will continue to work through her grief response and monitor her moods. Stephanie will continue to focus on a balance of allowing herself to feel her emotions and also allowing herself to distract from them. She will continue to practice mindful meditation, as she is able. At the next session, the therapist will use Bereavement Therapy, Client-centered Therapy, Dialectical Behavior Therapy, Mindfulness-based Strategies, Motivational Interviewing, Solution-Focused Therapy, and Supportive Psychotherapy to address her mood regulation, grief response, physical health concerns, and relationships.    Behavioral Health Treatment Plan and Discharge Planning: Stephanie Schneider is aware of and agrees to continue to work on their treatment plan. They have identified and are working toward their discharge goals. yes    Visit start and stop times:    05/17/24  Start Time: 1004  Stop Time: 1100  Total Visit Time: 56 minutes        "

## 2024-05-18 DIAGNOSIS — G43.709 CHRONIC MIGRAINE WITHOUT AURA WITHOUT STATUS MIGRAINOSUS, NOT INTRACTABLE: ICD-10-CM

## 2024-05-18 RX ORDER — INDOMETHACIN 25 MG/1
CAPSULE ORAL
Qty: 30 CAPSULE | Refills: 0 | Status: CANCELLED | OUTPATIENT
Start: 2024-05-18

## 2024-05-20 NOTE — TELEPHONE ENCOUNTER
Received VM transcription from 5/20/24, 9:03 AM:    Hi, my name is Cruz Schneider. My birthday is [birth year]. My phone number is [phone]. And I had requested a refill on my Indomethacin and it was denied. So I'm just calling to figure out why. But if someone could call me back. Thank you.  ------------------------    Spoke with pt and informed her of script sent in January with 6 additional refills. Pt states that she has not yet called pharmacy to request refill. Says she just went based off her pill bottle that says 0 refills. Pt says she will call them to request refill. Nothing further at this time.

## 2024-05-23 ENCOUNTER — TELEMEDICINE (OUTPATIENT)
Dept: BEHAVIORAL/MENTAL HEALTH CLINIC | Facility: CLINIC | Age: 34
End: 2024-05-23
Payer: COMMERCIAL

## 2024-05-23 DIAGNOSIS — F41.1 GENERALIZED ANXIETY DISORDER: Chronic | ICD-10-CM

## 2024-05-23 DIAGNOSIS — F33.1 DEPRESSION, MAJOR, RECURRENT, MODERATE (HCC): Primary | Chronic | ICD-10-CM

## 2024-05-23 PROCEDURE — 90837 PSYTX W PT 60 MINUTES: CPT | Performed by: SOCIAL WORKER

## 2024-05-28 NOTE — PSYCH
Virtual Regular Visit    Verification of patient location:    Patient is located at Home in the following state in which I hold an active license [place]      Assessment/Plan:    Problem List Items Addressed This Visit          Behavioral Health    Generalized anxiety disorder (Chronic)    Depression, major, recurrent, moderate (HCC) - Primary (Chronic)       Goals addressed in session: Goal 1          Reason for visit is No chief complaint on file.       Encounter provider APARNA Fox      Recent Visits  No visits were found meeting these conditions.  Showing recent visits within past 7 days and meeting all other requirements  Future Appointments  No visits were found meeting these conditions.  Showing future appointments within next 150 days and meeting all other requirements       The patient was identified by name and date of birth. Cruz Schneider was informed that this is a telemedicine visit and that the visit is being conducted throughthe Epic Embedded platform. She agrees to proceed..  My office door was closed. No one else was in the room.  She acknowledged consent and understanding of privacy and security of the video platform. The patient has agreed to participate and understands they can discontinue the visit at any time.    Patient is aware this is a billable service.     Subjective  Cruz Schneider is a 33 y.o. female.      HPI     Past Medical History:   Diagnosis Date    Abnormal Pap smear of cervix     Allergic     Anxiety     Arthritis     Depression     Fibromyalgia, primary     GERD (gastroesophageal reflux disease)     Hypertension     IBS (irritable bowel syndrome)     Migraine     Obesity     Vitamin B12 deficiency        Past Surgical History:   Procedure Laterality Date    COLONOSCOPY N/A 5/8/2018    Procedure: COLONOSCOPY;  Surgeon: Stehpanie Alston MD;  Location: [facility] GI LAB;  Service: Gastroenterology    ID EXC B9 LESION MRGN XCP SK TG S/N/H/F/G > 4.0CM N/A 7/1/2021    Procedure:  EXCISION OF PILAR SCALP CYST X 2 AND RIGHT INNER GROIN NEVVUS;  Surgeon: Denis Barron MD;  Location:  MAIN OR;  Service: Plastics    OH REPAIR COMPLEX SCALP/ARM/LEG 2.6-7.5 CM N/A 7/1/2021    Procedure: CLOSURE WOUND SCALP X 2 AND RIGHT INNER GROIN;  Surgeon: Denis Barron MD;  Location:  MAIN OR;  Service: Plastics    TONSILLECTOMY      WISDOM TOOTH EXTRACTION         Current Outpatient Medications   Medication Sig Dispense Refill    ALPRAZolam (XANAX) 0.5 mg tablet Take 1 tablet (0.5 mg total) by mouth 3 (three) times a day as needed for anxiety 90 tablet 2    ascorbic acid (VITAMIN C) 500 MG tablet Take 500 mg by mouth daily      Botox 200 units SOLR       buPROPion (WELLBUTRIN XL) 300 mg 24 hr tablet Take 1 tablet (300 mg total) by mouth daily 90 tablet 0    Cholecalciferol (Vitamin D3) 50 MCG (2000 UT) capsule Take 1 capsule (2,000 Units total) by mouth daily 90 capsule 1    Cholecalciferol (Vitamin D3) 50 MCG (2000 UT) TABS       Cyanocobalamin (Vitamin B-12) 500 MCG SUBL Place 1 tablet (500 mcg total) under the tongue in the morning 30 tablet 2    cyanocobalamin 1,000 mcg/mL 1 IM injection every month 3 mL 3    dicyclomine (BENTYL) 20 mg tablet Take 1 tablet (20 mg total) by mouth 2 (two) times a day 20 tablet 0    escitalopram (LEXAPRO) 20 mg tablet Take 1 tablet (20 mg total) by mouth daily 90 tablet 0    esomeprazole (NexIUM) 40 MG capsule Take 40 mg by mouth      etonogestrel-ethinyl estradiol (NuvaRing) 0.12-0.015 MG/24HR vaginal ring Insert ring for 21 days then remove for one week. 3 each 4    FeroSul 325 (65 Fe) MG tablet Take 1 tablet (325 mg total) by mouth in the morning 90 tablet 1    gabapentin (Neurontin) 300 mg capsule Take 1 capsule (300 mg total) by mouth 3 (three) times a day 90 capsule 5    Galcanezumab-gnlm (Emgality) 120 MG/ML SOAJ One ml subcutaneous on the right thigh and 1 ml subcutaneous on the left thigh at the same time for 1 dose 2 mL 0    Galcanezumab-gnlm (Emgality) 120  MG/ML SOAJ 30 days after loading dose, inject 1 pen subq every 30 days. 1 mL 11    indomethacin (INDOCIN) 25 mg capsule 1-2 tabs BID prn severe headache with food. Hold toradol. 30 capsule 6    inFLIXimab-axxq (Avsola) 100 mg SOLR Inject 5 mg/kg into a catheter in a vein      loperamide (IMODIUM) 2 mg capsule Take 1 capsule (2 mg total) by mouth 4 (four) times a day as needed for diarrhea 12 capsule 0    magnesium Oxide (MAG-OX) 400 mg TABS Take 1 tablet (400 mg total) by mouth daily 30 tablet 5    methocarbamol (ROBAXIN) 500 mg tablet       metoprolol succinate (Toprol XL) 25 mg 24 hr tablet Take 1 tablet (25 mg total) by mouth daily (Patient not taking: Reported on 5/17/2024) 90 tablet 3    mexiletine (MEXITIL) 150 mg capsule Take 1 capsule (150 mg total) by mouth 2 (two) times a day 180 capsule 0    mupirocin (BACTROBAN) 2 % ointment Apply topically 2 (two) times a day 15 g 0    OLANZapine (ZyPREXA) 5 mg tablet Take 1 tablet (5 mg total) by mouth daily at bedtime Do not take with reglan. 30 tablet 2    onabotulinumtoxin A (BOTOX) 100 units Inject as directed      ondansetron (ZOFRAN) 4 mg tablet Take 1 tablet (4 mg total) by mouth every 6 (six) hours 30 tablet 2    pramipexole (MIRAPEX) 0.25 mg tablet Take 1 tablet (0.25 mg total) by mouth 2 (two) times a day 68 tablet 3    prazosin (MINIPRESS) 2 mg capsule Take 1 capsule (2 mg total) by mouth daily at bedtime 90 capsule 0    predniSONE 5 mg tablet       Sodium Fluoride 1.1 % PSTE Apply 1 Application to teeth daily at bedtime 100 mL 0    Trudhesa 0.725 MG/ACT AERS 1 spray in each nostril for total dose of 1.45mg, may repeat 1 dose after 1 hour. Max 2 doses per 24 hours and 3 doses per week. 12 mL 6     Current Facility-Administered Medications   Medication Dose Route Frequency Provider Last Rate Last Admin    cyanocobalamin injection 1,000 mcg  1,000 mcg Intramuscular Q30 Days Oni Chapincito, DO   1,000 mcg at 03/14/24 1450        Allergies   Allergen Reactions     "Cimzia [Certolizumab Pegol] Swelling    Desvenlafaxine      Other reaction(s): state of confusion    Ixekizumab Vomiting    Seasonal Ic [Octacosanol] Nasal Congestion    Valproic Acid Other (See Comments)     Other reaction(s): dilated pupils, \"schizi\"    Voltaren [Diclofenac] Swelling    Tizanidine Anxiety       Review of Systems    Video Exam    There were no vitals filed for this visit.    Physical Exam     Behavioral Health Psychotherapy Progress Note    Psychotherapy Provided: Individual Psychotherapy     1. Depression, major, recurrent, moderate (HCC)        2. Generalized anxiety disorder            Goals addressed in session: Goal 1     DATA: Met with Stephanie for her scheduled individual session. Stephanie continues to discuss her grief response regarding her grandmother's recent death. She is asking questions about her experiences and processing through her emotions. This clinician continues to normalize and validate her experience. I have informed her that this is not a linear process and that she will experience ups and downs. She continues to also struggle with her physical health issues. She discussed her application/appeal for social security. She states that she would very much like to work, but she knows that she is not currently able to maintain a job. We discussed the possibility of her working in the future. She states that she continues to want to know what her \"purpose\" is. We discussed taking things slowly, as she is in the midst of her grief response from a significant loss, and I reassured her that she will be able to move through this and find something that brings her a sense of purpose. We discussed the use of mindfulness-based strategies to help her maintain mood regulation. She also continues to use her journals to help her document her emotions and her physical experiences (pain, etc).    During this session, this clinician used the following therapeutic modalities: Bereavement Therapy, " "Client-centered Therapy, Dialectical Behavior Therapy, Mindfulness-based Strategies, Motivational Interviewing, Solution-Focused Therapy, and Supportive Psychotherapy    Substance Abuse was not addressed during this session. If the client is diagnosed with a co-occurring substance use disorder, please indicate any changes in the frequency or amount of use: n/a. Stage of change for addressing substance use diagnoses: medicinal MJ use- no substance use disorder identified.    ASSESSMENT:  Stephanie Schneider presents with a Euthymic/ normal mood.     her affect is Normal range and intensity, which is congruent, with her mood and the content of the session. The client has made progress on their goals.    Stephanie Schneider presents with a minimal risk of suicide, minimal risk of self-harm, and minimal risk of harm to others.    For any risk assessment that surpasses a \"low\" rating, a safety plan must be developed.    A safety plan was indicated: no  If yes, describe in detail n/a    PLAN: Between sessions, Stephanie Schneider will continue to move through her emotions, while focusing on allowing herself to just feel her feelings without judgment. At the next session, the therapist will use Bereavement Therapy, Client-centered Therapy, Dialectical Behavior Therapy, Mindfulness-based Strategies, Motivational Interviewing, Solution-Focused Therapy, and Supportive Psychotherapy to address her mood regulation, grief response, and relationship concerns.    Behavioral Health Treatment Plan and Discharge Planning: Stephanie Schneider is aware of and agrees to continue to work on their treatment plan. They have identified and are working toward their discharge goals. yes    Visit start and stop times:    05/10/24  Start Time: 0906  Stop Time: 1000  Total Visit Time: 54 minutes      "

## 2024-05-29 ENCOUNTER — APPOINTMENT (OUTPATIENT)
Dept: LAB | Facility: CLINIC | Age: 34
End: 2024-05-29

## 2024-05-29 DIAGNOSIS — Z00.6 ENCOUNTER FOR EXAMINATION FOR NORMAL COMPARISON OR CONTROL IN CLINICAL RESEARCH PROGRAM: ICD-10-CM

## 2024-05-29 PROCEDURE — 36415 COLL VENOUS BLD VENIPUNCTURE: CPT

## 2024-05-31 ENCOUNTER — TELEMEDICINE (OUTPATIENT)
Dept: BEHAVIORAL/MENTAL HEALTH CLINIC | Facility: CLINIC | Age: 34
End: 2024-05-31

## 2024-05-31 DIAGNOSIS — F41.1 GENERALIZED ANXIETY DISORDER: Chronic | ICD-10-CM

## 2024-05-31 DIAGNOSIS — F33.1 DEPRESSION, MAJOR, RECURRENT, MODERATE (HCC): Primary | Chronic | ICD-10-CM

## 2024-05-31 NOTE — PSYCH
Virtual Regular Visit    Verification of patient location:    Patient is located at Home in the following state in which I hold an active license [place]    Assessment/Plan:    Problem List Items Addressed This Visit          Behavioral Health    Generalized anxiety disorder (Chronic)    Depression, major, recurrent, moderate (HCC) - Primary (Chronic)       Goals addressed in session: Goal 1      Reason for visit is   Chief Complaint   Patient presents with    Virtual Regular Visit     Encounter provider APARNA Fox    Recent Visits  No visits were found meeting these conditions.  Showing recent visits within past 7 days and meeting all other requirements  Today's Visits  Date Type Provider Dept   05/31/24 Telemedicine APARNA Fox [facility] Therapist [place]   Showing today's visits and meeting all other requirements  Future Appointments  No visits were found meeting these conditions.  Showing future appointments within next 150 days and meeting all other requirements       The patient was identified by name and date of birth. Cruz Schneider was informed that this is a telemedicine visit and that the visit is being conducted throughthe Epic Embedded platform. She agrees to proceed..  My office door was closed. No one else was in the room.  She acknowledged consent and understanding of privacy and security of the video platform. The patient has agreed to participate and understands they can discontinue the visit at any time.    Patient is aware this is a billable service.     Subjective  Cruz Schneider is a 33 y.o. female.      HPI     Past Medical History:   Diagnosis Date    Abnormal Pap smear of cervix     Allergic     Anxiety     Arthritis     Depression     Fibromyalgia, primary     GERD (gastroesophageal reflux disease)     Hypertension     IBS (irritable bowel syndrome)     Migraine     Obesity     Vitamin B12 deficiency        Past Surgical History:   Procedure Laterality Date     COLONOSCOPY N/A 5/8/2018    Procedure: COLONOSCOPY;  Surgeon: Stephanie Alston MD;  Location: [facility] GI LAB;  Service: Gastroenterology    ND EXC B9 LESION MRGN XCP SK TG S/N/H/F/G > 4.0CM N/A 7/1/2021    Procedure: EXCISION OF PILAR SCALP CYST X 2 AND RIGHT INNER GROIN NEVVUS;  Surgeon: Denis Barron MD;  Location:  MAIN OR;  Service: Plastics    ND REPAIR COMPLEX SCALP/ARM/LEG 2.6-7.5 CM N/A 7/1/2021    Procedure: CLOSURE WOUND SCALP X 2 AND RIGHT INNER GROIN;  Surgeon: Denis Barron MD;  Location:  MAIN OR;  Service: Plastics    TONSILLECTOMY      WISDOM TOOTH EXTRACTION         Current Outpatient Medications   Medication Sig Dispense Refill    ALPRAZolam (XANAX) 0.5 mg tablet Take 1 tablet (0.5 mg total) by mouth 3 (three) times a day as needed for anxiety 90 tablet 2    ascorbic acid (VITAMIN C) 500 MG tablet Take 500 mg by mouth daily      Botox 200 units SOLR       buPROPion (WELLBUTRIN XL) 300 mg 24 hr tablet Take 1 tablet (300 mg total) by mouth daily 90 tablet 0    Cholecalciferol (Vitamin D3) 50 MCG (2000 UT) capsule Take 1 capsule (2,000 Units total) by mouth daily 90 capsule 1    Cholecalciferol (Vitamin D3) 50 MCG (2000 UT) TABS       Cyanocobalamin (Vitamin B-12) 500 MCG SUBL Place 1 tablet (500 mcg total) under the tongue in the morning 30 tablet 2    cyanocobalamin 1,000 mcg/mL 1 IM injection every month 3 mL 3    dicyclomine (BENTYL) 20 mg tablet Take 1 tablet (20 mg total) by mouth 2 (two) times a day 20 tablet 0    escitalopram (LEXAPRO) 20 mg tablet Take 1 tablet (20 mg total) by mouth daily 90 tablet 0    esomeprazole (NexIUM) 40 MG capsule Take 40 mg by mouth      etonogestrel-ethinyl estradiol (NuvaRing) 0.12-0.015 MG/24HR vaginal ring Insert ring for 21 days then remove for one week. 3 each 4    FeroSul 325 (65 Fe) MG tablet Take 1 tablet (325 mg total) by mouth in the morning 90 tablet 1    gabapentin (Neurontin) 300 mg capsule Take 1 capsule (300 mg total) by mouth 3 (three) times a  day 90 capsule 5    Galcanezumab-gnlm (Emgality) 120 MG/ML SOAJ One ml subcutaneous on the right thigh and 1 ml subcutaneous on the left thigh at the same time for 1 dose 2 mL 0    Galcanezumab-gnlm (Emgality) 120 MG/ML SOAJ 30 days after loading dose, inject 1 pen subq every 30 days. 1 mL 11    indomethacin (INDOCIN) 25 mg capsule 1-2 tabs BID prn severe headache with food. Hold toradol. 30 capsule 6    inFLIXimab-axxq (Avsola) 100 mg SOLR Inject 5 mg/kg into a catheter in a vein      loperamide (IMODIUM) 2 mg capsule Take 1 capsule (2 mg total) by mouth 4 (four) times a day as needed for diarrhea 12 capsule 0    magnesium Oxide (MAG-OX) 400 mg TABS Take 1 tablet (400 mg total) by mouth daily 30 tablet 5    methocarbamol (ROBAXIN) 500 mg tablet       metoprolol succinate (Toprol XL) 25 mg 24 hr tablet Take 1 tablet (25 mg total) by mouth daily (Patient not taking: Reported on 5/17/2024) 90 tablet 3    mexiletine (MEXITIL) 150 mg capsule Take 1 capsule (150 mg total) by mouth 2 (two) times a day 180 capsule 0    mupirocin (BACTROBAN) 2 % ointment Apply topically 2 (two) times a day 15 g 0    OLANZapine (ZyPREXA) 5 mg tablet Take 1 tablet (5 mg total) by mouth daily at bedtime Do not take with reglan. 30 tablet 2    onabotulinumtoxin A (BOTOX) 100 units Inject as directed      ondansetron (ZOFRAN) 4 mg tablet Take 1 tablet (4 mg total) by mouth every 6 (six) hours 30 tablet 2    pramipexole (MIRAPEX) 0.25 mg tablet Take 1 tablet (0.25 mg total) by mouth 2 (two) times a day 68 tablet 3    prazosin (MINIPRESS) 2 mg capsule Take 1 capsule (2 mg total) by mouth daily at bedtime 90 capsule 0    predniSONE 5 mg tablet       Sodium Fluoride 1.1 % PSTE Apply 1 Application to teeth daily at bedtime 100 mL 0    Trudhesa 0.725 MG/ACT AERS 1 spray in each nostril for total dose of 1.45mg, may repeat 1 dose after 1 hour. Max 2 doses per 24 hours and 3 doses per week. 12 mL 6     Current Facility-Administered Medications  "  Medication Dose Route Frequency Provider Last Rate Last Admin    cyanocobalamin injection 1,000 mcg  1,000 mcg Intramuscular Q30 Days Oni Beckham, DO   1,000 mcg at 03/14/24 1450        Allergies   Allergen Reactions    Cimzia [Certolizumab Pegol] Swelling    Desvenlafaxine      Other reaction(s): state of confusion    Ixekizumab Vomiting    Seasonal Ic [Octacosanol] Nasal Congestion    Valproic Acid Other (See Comments)     Other reaction(s): dilated pupils, \"schizi\"    Voltaren [Diclofenac] Swelling    Tizanidine Anxiety       Review of Systems    Video Exam    There were no vitals filed for this visit.    Physical Exam     Behavioral Health Psychotherapy Progress Note    Psychotherapy Provided: Individual Psychotherapy     1. Depression, major, recurrent, moderate (HCC)        2. Generalized anxiety disorder            Goals addressed in session: Goal 1     DATA: Met with Stephanie for her scheduled individual session. She states, \"I feel like my depression is back.\" She is able to attribute this, in part, to the recent loss of her grandmother. She states that she has been missing her grandmother, because she could go to her when she felt like \"giving up.\" She states that she is not experiencing any suicidal ideation; however, she feels that her depression is deeper than is typical for her. She talked about her relationship with her mother and her guilt/shame about \"putting more on her.\" She states that she does not feel that she can approach her mother in the same way that she could approach her grandmother, because she does not want to put an extra burden on her mother. She does state that she was able to ask her mother for help with reorganizing her medications, which has helped her to improve her adherence with her medication regimen. Stephanie states that she has \"not been nice to herself\" over the past week. We spent some time talking about some of the ways that Stephanie was able to be positive toward herself " "and some of the things that she has done that bring her a sense of accomplishment. Stephanie states that she has been having difficulty with her friendship with [employer]i. She states that her friend has not been reaching out and checking on her as much as she would like. Stephanie discussed her desire to do something that she can enjoy-- e.g., going to an amusement park. We discussed the ways that she can seek this type of activity while also managing her pain issues. She states that she is feeling an increase in her physical health symptoms since stopping her infusion. She states that she is going to work with her provider to develop a plan for addressing her symptoms. We discussed Stephanie's goals for her future. We discussed ways that she can increase her social network and also work on finding \"purpose\" in her life. This clinician recommended [facility] and provided her with the website information. Stephanie is agreeable to making a call to them to inquire about the program.     During this session, this clinician used the following therapeutic modalities: Client-centered Therapy, Dialectical Behavior Therapy, Mindfulness-based Strategies, Motivational Interviewing, Solution-Focused Therapy, and Supportive Psychotherapy    Substance Abuse was not addressed during this session. If the client is diagnosed with a co-occurring substance use disorder, please indicate any changes in the frequency or amount of use: n/a. Stage of change for addressing substance use diagnoses: No substance use/Not applicable    ASSESSMENT:  Stephanie Schneider presents with a Dysthymic mood.     her affect is Normal range and intensity, which is congruent, with her mood and the content of the session. The client has made progress on their goals, as evidenced by her attempts to reorganize her medications and become more adherent with her medication regimen    Stephanie Schneider presents with a minimal risk of suicide, minimal risk of self-harm, and minimal risk " "of harm to others.    For any risk assessment that surpasses a \"low\" rating, a safety plan must be developed.    A safety plan was indicated: no  If yes, describe in detail n/a    PLAN: Between sessions, Stephanie Schneider will continue to monitor her moods. She will continue to use grounding and positive self-talk to improve her mood. She will reach out to [facility] to inquire about their program prior to the next session. At the next session, the therapist will use Client-centered Therapy, Dialectical Behavior Therapy, Mindfulness-based Strategies, Motivational Interviewing, Solution-Focused Therapy, and Supportive Psychotherapy to address her mood regulation, relationship concerns, and physical health concerns.    Behavioral Health Treatment Plan and Discharge Planning: Stephanie Schneider is aware of and agrees to continue to work on their treatment plan. They have identified and are working toward their discharge goals. yes    Visit start and stop times:    05/31/24  Start Time: 0830  Stop Time: 0932  Total Visit Time: 62 minutes        "

## 2024-05-31 NOTE — PSYCH
Virtual Regular Visit    Verification of patient location:    Patient is located at Home in the following state in which I hold an active license [place]      Assessment/Plan:    Problem List Items Addressed This Visit          Behavioral Health    Generalized anxiety disorder (Chronic)    Depression, major, recurrent, moderate (HCC) - Primary (Chronic)       Goals addressed in session: Goal 1          Reason for visit is   Chief Complaint   Patient presents with    Virtual Regular Visit          Encounter provider APARNA Fox      Recent Visits  No visits were found meeting these conditions.  Showing recent visits within past 7 days and meeting all other requirements  Today's Visits  Date Type Provider Dept   05/31/24 Telemedicine APARNA Fox [facility] Therapist [place]   Showing today's visits and meeting all other requirements  Future Appointments  No visits were found meeting these conditions.  Showing future appointments within next 150 days and meeting all other requirements       The patient was identified by name and date of birth. Cruz Schneider was informed that this is a telemedicine visit and that the visit is being conducted throughthe Epic Embedded platform. She agrees to proceed..  My office door was closed. No one else was in the room.  She acknowledged consent and understanding of privacy and security of the video platform. The patient has agreed to participate and understands they can discontinue the visit at any time.    Patient is aware this is a billable service.     Subjective  Cruz Schneider is a 33 y.o. female.      HPI     Past Medical History:   Diagnosis Date    Abnormal Pap smear of cervix     Allergic     Anxiety     Arthritis     Depression     Fibromyalgia, primary     GERD (gastroesophageal reflux disease)     Hypertension     IBS (irritable bowel syndrome)     Migraine     Obesity     Vitamin B12 deficiency        Past Surgical History:   Procedure  Laterality Date    COLONOSCOPY N/A 5/8/2018    Procedure: COLONOSCOPY;  Surgeon: Stephanie Alston MD;  Location: [facility] GI LAB;  Service: Gastroenterology    SD EXC B9 LESION MRGN XCP SK TG S/N/H/F/G > 4.0CM N/A 7/1/2021    Procedure: EXCISION OF PILAR SCALP CYST X 2 AND RIGHT INNER GROIN NEVVUS;  Surgeon: Denis Barron MD;  Location:  MAIN OR;  Service: Plastics    SD REPAIR COMPLEX SCALP/ARM/LEG 2.6-7.5 CM N/A 7/1/2021    Procedure: CLOSURE WOUND SCALP X 2 AND RIGHT INNER GROIN;  Surgeon: Denis Barron MD;  Location:  MAIN OR;  Service: Plastics    TONSILLECTOMY      WISDOM TOOTH EXTRACTION         Current Outpatient Medications   Medication Sig Dispense Refill    ALPRAZolam (XANAX) 0.5 mg tablet Take 1 tablet (0.5 mg total) by mouth 3 (three) times a day as needed for anxiety 90 tablet 2    ascorbic acid (VITAMIN C) 500 MG tablet Take 500 mg by mouth daily      Botox 200 units SOLR       buPROPion (WELLBUTRIN XL) 300 mg 24 hr tablet Take 1 tablet (300 mg total) by mouth daily 90 tablet 0    Cholecalciferol (Vitamin D3) 50 MCG (2000 UT) capsule Take 1 capsule (2,000 Units total) by mouth daily 90 capsule 1    Cholecalciferol (Vitamin D3) 50 MCG (2000 UT) TABS       Cyanocobalamin (Vitamin B-12) 500 MCG SUBL Place 1 tablet (500 mcg total) under the tongue in the morning 30 tablet 2    cyanocobalamin 1,000 mcg/mL 1 IM injection every month 3 mL 3    dicyclomine (BENTYL) 20 mg tablet Take 1 tablet (20 mg total) by mouth 2 (two) times a day 20 tablet 0    escitalopram (LEXAPRO) 20 mg tablet Take 1 tablet (20 mg total) by mouth daily 90 tablet 0    esomeprazole (NexIUM) 40 MG capsule Take 40 mg by mouth      etonogestrel-ethinyl estradiol (NuvaRing) 0.12-0.015 MG/24HR vaginal ring Insert ring for 21 days then remove for one week. 3 each 4    FeroSul 325 (65 Fe) MG tablet Take 1 tablet (325 mg total) by mouth in the morning 90 tablet 1    gabapentin (Neurontin) 300 mg capsule Take 1 capsule (300 mg total) by  mouth 3 (three) times a day 90 capsule 5    Galcanezumab-gnlm (Emgality) 120 MG/ML SOAJ One ml subcutaneous on the right thigh and 1 ml subcutaneous on the left thigh at the same time for 1 dose 2 mL 0    Galcanezumab-gnlm (Emgality) 120 MG/ML SOAJ 30 days after loading dose, inject 1 pen subq every 30 days. 1 mL 11    indomethacin (INDOCIN) 25 mg capsule 1-2 tabs BID prn severe headache with food. Hold toradol. 30 capsule 6    inFLIXimab-axxq (Avsola) 100 mg SOLR Inject 5 mg/kg into a catheter in a vein      loperamide (IMODIUM) 2 mg capsule Take 1 capsule (2 mg total) by mouth 4 (four) times a day as needed for diarrhea 12 capsule 0    magnesium Oxide (MAG-OX) 400 mg TABS Take 1 tablet (400 mg total) by mouth daily 30 tablet 5    methocarbamol (ROBAXIN) 500 mg tablet       metoprolol succinate (Toprol XL) 25 mg 24 hr tablet Take 1 tablet (25 mg total) by mouth daily (Patient not taking: Reported on 5/17/2024) 90 tablet 3    mexiletine (MEXITIL) 150 mg capsule Take 1 capsule (150 mg total) by mouth 2 (two) times a day 180 capsule 0    mupirocin (BACTROBAN) 2 % ointment Apply topically 2 (two) times a day 15 g 0    OLANZapine (ZyPREXA) 5 mg tablet Take 1 tablet (5 mg total) by mouth daily at bedtime Do not take with reglan. 30 tablet 2    onabotulinumtoxin A (BOTOX) 100 units Inject as directed      ondansetron (ZOFRAN) 4 mg tablet Take 1 tablet (4 mg total) by mouth every 6 (six) hours 30 tablet 2    pramipexole (MIRAPEX) 0.25 mg tablet Take 1 tablet (0.25 mg total) by mouth 2 (two) times a day 68 tablet 3    prazosin (MINIPRESS) 2 mg capsule Take 1 capsule (2 mg total) by mouth daily at bedtime 90 capsule 0    predniSONE 5 mg tablet       Sodium Fluoride 1.1 % PSTE Apply 1 Application to teeth daily at bedtime 100 mL 0    Trudhesa 0.725 MG/ACT AERS 1 spray in each nostril for total dose of 1.45mg, may repeat 1 dose after 1 hour. Max 2 doses per 24 hours and 3 doses per week. 12 mL 6     Current  "Facility-Administered Medications   Medication Dose Route Frequency Provider Last Rate Last Admin    cyanocobalamin injection 1,000 mcg  1,000 mcg Intramuscular Q30 Days Oni Beckham, DO   1,000 mcg at 03/14/24 1450        Allergies   Allergen Reactions    Cimzia [Certolizumab Pegol] Swelling    Desvenlafaxine      Other reaction(s): state of confusion    Ixekizumab Vomiting    Seasonal Ic [Octacosanol] Nasal Congestion    Valproic Acid Other (See Comments)     Other reaction(s): dilated pupils, \"schizi\"    Voltaren [Diclofenac] Swelling    Tizanidine Anxiety       Review of Systems    Video Exam    There were no vitals filed for this visit.    Physical Exam     Behavioral Health Psychotherapy Progress Note    Psychotherapy Provided: Individual Psychotherapy     1. Depression, major, recurrent, moderate (HCC)        2. Generalized anxiety disorder            Goals addressed in session: Goal 1     DATA: Met with Stephanie for her scheduled individual session. She discussed her experiences dog-sitting for her friends and her ambivalence about being away from home. She states that she both needed a break and had some feelings of guilt regarding not being at home with her mother. She states that she feels that she continues to be depressed and sees herself as being very hard on herself. She states that she has not been 100% adherent with her medication regimen. We discussed the connections between medication adherence and her mood regulation. She is able to identify that she feels better when she does maintain medication adherence. We discussed some grounding techniques and use of positive affirmations. She endorses a positive response to positive affirmations that she can draw and hang up on various areas where she can see them. She is agreeable to making these signs and using them in her home. We discussed her social supports-- including both friends and family members. Stephanie continues to struggle with her relationship " "with her best friend and her expectations for what her friend is able to provide-- in terms of emotional support. This clinician continues to encourage Stephanie to expand her social supports.     During this session, this clinician used the following therapeutic modalities: Client-centered Therapy, Dialectical Behavior Therapy, Mindfulness-based Strategies, Motivational Interviewing, Solution-Focused Therapy, and Supportive Psychotherapy    Substance Abuse was not addressed during this session. If the client is diagnosed with a co-occurring substance use disorder, please indicate any changes in the frequency or amount of use: n/a. Stage of change for addressing substance use diagnoses: No substance use/Not applicable    ASSESSMENT:  Stephanie Schneider presents with a Dysthymic mood.     her affect is Normal range and intensity, which is congruent, with her mood and the content of the session. The client has not made progress on their goals since her last session    Stephanie Schneider presents with a minimal risk of suicide, minimal risk of self-harm, and minimal risk of harm to others.    For any risk assessment that surpasses a \"low\" rating, a safety plan must be developed.    A safety plan was indicated: no  If yes, describe in detail n/a    PLAN: Between sessions, Stephanie Schneider will continue to monitor her moods, increase her medication adherence, increase use of positive self-talk and affirmations. At the next session, the therapist will use Client-centered Therapy, Dialectical Behavior Therapy, Mindfulness-based Strategies, Motivational Interviewing, Solution-Focused Therapy, and Supportive Psychotherapy to address her mood regulation and relationship concerns.    Behavioral Health Treatment Plan and Discharge Planning: Stephanie Schneider is aware of and agrees to continue to work on their treatment plan. They have identified and are working toward their discharge goals. yes    Visit start and stop times:    05/23/24  Start " Time: 1101  Stop Time: 1155  Total Visit Time: 54 minutes

## 2024-06-07 ENCOUNTER — TELEMEDICINE (OUTPATIENT)
Dept: BEHAVIORAL/MENTAL HEALTH CLINIC | Facility: CLINIC | Age: 34
End: 2024-06-07
Payer: COMMERCIAL

## 2024-06-07 DIAGNOSIS — F41.1 GENERALIZED ANXIETY DISORDER: Chronic | ICD-10-CM

## 2024-06-07 DIAGNOSIS — F33.1 DEPRESSION, MAJOR, RECURRENT, MODERATE (HCC): Primary | Chronic | ICD-10-CM

## 2024-06-07 PROCEDURE — 90837 PSYTX W PT 60 MINUTES: CPT | Performed by: SOCIAL WORKER

## 2024-06-07 NOTE — PSYCH
Virtual Regular Visit    Verification of patient location:    Patient is located at Home in the following state in which I hold an active license [place]      Assessment/Plan:    Problem List Items Addressed This Visit          Behavioral Health    Generalized anxiety disorder (Chronic)    Depression, major, recurrent, moderate (HCC) - Primary (Chronic)       Goals addressed in session: Goal 1          Reason for visit is   Chief Complaint   Patient presents with    Virtual Regular Visit          Encounter provider APARNA Fox      Recent Visits  Date Type Provider Dept   05/31/24 Telemedicine APARNA Fox [facility] Therapist [place]   Showing recent visits within past 7 days and meeting all other requirements  Today's Visits  Date Type Provider Dept   06/07/24 Telemedicine APARNA Fox [facility] Therapist [place]   Showing today's visits and meeting all other requirements  Future Appointments  No visits were found meeting these conditions.  Showing future appointments within next 150 days and meeting all other requirements       The patient was identified by name and date of birth. Cruz Schneider was informed that this is a telemedicine visit and that the visit is being conducted throughthe Epic Embedded platform. She agrees to proceed..  My office door was closed. No one else was in the room.  She acknowledged consent and understanding of privacy and security of the video platform. The patient has agreed to participate and understands they can discontinue the visit at any time.    Patient is aware this is a billable service.     Subjective  Cruz Schneider is a 33 y.o. female.      HPI     Past Medical History:   Diagnosis Date    Abnormal Pap smear of cervix     Allergic     Anxiety     Arthritis     Depression     Fibromyalgia, primary     GERD (gastroesophageal reflux disease)     Hypertension     IBS (irritable bowel syndrome)     Migraine     Obesity     Vitamin  B12 deficiency        Past Surgical History:   Procedure Laterality Date    COLONOSCOPY N/A 5/8/2018    Procedure: COLONOSCOPY;  Surgeon: Stephanie Alston MD;  Location: [facility] GI LAB;  Service: Gastroenterology    DE EXC B9 LESION MRGN XCP SK TG S/N/H/F/G > 4.0CM N/A 7/1/2021    Procedure: EXCISION OF PILAR SCALP CYST X 2 AND RIGHT INNER GROIN NEVVUS;  Surgeon: Denis Barron MD;  Location:  MAIN OR;  Service: Plastics    DE REPAIR COMPLEX SCALP/ARM/LEG 2.6-7.5 CM N/A 7/1/2021    Procedure: CLOSURE WOUND SCALP X 2 AND RIGHT INNER GROIN;  Surgeon: Denis Barron MD;  Location:  MAIN OR;  Service: Plastics    TONSILLECTOMY      WISDOM TOOTH EXTRACTION         Current Outpatient Medications   Medication Sig Dispense Refill    ALPRAZolam (XANAX) 0.5 mg tablet Take 1 tablet (0.5 mg total) by mouth 3 (three) times a day as needed for anxiety 90 tablet 2    ascorbic acid (VITAMIN C) 500 MG tablet Take 500 mg by mouth daily      Botox 200 units SOLR       buPROPion (WELLBUTRIN XL) 300 mg 24 hr tablet Take 1 tablet (300 mg total) by mouth daily 90 tablet 0    Cholecalciferol (Vitamin D3) 50 MCG (2000 UT) capsule Take 1 capsule (2,000 Units total) by mouth daily 90 capsule 1    Cholecalciferol (Vitamin D3) 50 MCG (2000 UT) TABS       Cyanocobalamin (Vitamin B-12) 500 MCG SUBL Place 1 tablet (500 mcg total) under the tongue in the morning 30 tablet 2    cyanocobalamin 1,000 mcg/mL 1 IM injection every month 3 mL 3    dicyclomine (BENTYL) 20 mg tablet Take 1 tablet (20 mg total) by mouth 2 (two) times a day 20 tablet 0    escitalopram (LEXAPRO) 20 mg tablet Take 1 tablet (20 mg total) by mouth daily 90 tablet 0    esomeprazole (NexIUM) 40 MG capsule Take 40 mg by mouth      etonogestrel-ethinyl estradiol (NuvaRing) 0.12-0.015 MG/24HR vaginal ring Insert ring for 21 days then remove for one week. 3 each 4    FeroSul 325 (65 Fe) MG tablet Take 1 tablet (325 mg total) by mouth in the morning 90 tablet 1    gabapentin  (Neurontin) 300 mg capsule Take 1 capsule (300 mg total) by mouth 3 (three) times a day 90 capsule 5    Galcanezumab-gnlm (Emgality) 120 MG/ML SOAJ One ml subcutaneous on the right thigh and 1 ml subcutaneous on the left thigh at the same time for 1 dose 2 mL 0    Galcanezumab-gnlm (Emgality) 120 MG/ML SOAJ 30 days after loading dose, inject 1 pen subq every 30 days. 1 mL 11    indomethacin (INDOCIN) 25 mg capsule 1-2 tabs BID prn severe headache with food. Hold toradol. 30 capsule 6    inFLIXimab-axxq (Avsola) 100 mg SOLR Inject 5 mg/kg into a catheter in a vein      loperamide (IMODIUM) 2 mg capsule Take 1 capsule (2 mg total) by mouth 4 (four) times a day as needed for diarrhea 12 capsule 0    magnesium Oxide (MAG-OX) 400 mg TABS Take 1 tablet (400 mg total) by mouth daily 30 tablet 5    methocarbamol (ROBAXIN) 500 mg tablet       metoprolol succinate (Toprol XL) 25 mg 24 hr tablet Take 1 tablet (25 mg total) by mouth daily (Patient not taking: Reported on 5/17/2024) 90 tablet 3    mexiletine (MEXITIL) 150 mg capsule Take 1 capsule (150 mg total) by mouth 2 (two) times a day 180 capsule 0    mupirocin (BACTROBAN) 2 % ointment Apply topically 2 (two) times a day 15 g 0    OLANZapine (ZyPREXA) 5 mg tablet Take 1 tablet (5 mg total) by mouth daily at bedtime Do not take with reglan. 30 tablet 2    onabotulinumtoxin A (BOTOX) 100 units Inject as directed      ondansetron (ZOFRAN) 4 mg tablet Take 1 tablet (4 mg total) by mouth every 6 (six) hours 30 tablet 2    pramipexole (MIRAPEX) 0.25 mg tablet Take 1 tablet (0.25 mg total) by mouth 2 (two) times a day 68 tablet 3    prazosin (MINIPRESS) 2 mg capsule Take 1 capsule (2 mg total) by mouth daily at bedtime 90 capsule 0    predniSONE 5 mg tablet       Sodium Fluoride 1.1 % PSTE Apply 1 Application to teeth daily at bedtime 100 mL 0    Trudhesa 0.725 MG/ACT AERS 1 spray in each nostril for total dose of 1.45mg, may repeat 1 dose after 1 hour. Max 2 doses per 24 hours  "and 3 doses per week. 12 mL 6     Current Facility-Administered Medications   Medication Dose Route Frequency Provider Last Rate Last Admin    cyanocobalamin injection 1,000 mcg  1,000 mcg Intramuscular Q30 Days Oni Beckham, DO   1,000 mcg at 03/14/24 1450        Allergies   Allergen Reactions    Cimzia [Certolizumab Pegol] Swelling    Desvenlafaxine      Other reaction(s): state of confusion    Ixekizumab Vomiting    Seasonal Ic [Octacosanol] Nasal Congestion    Valproic Acid Other (See Comments)     Other reaction(s): dilated pupils, \"schizi\"    Voltaren [Diclofenac] Swelling    Tizanidine Anxiety       Review of Systems    Video Exam    There were no vitals filed for this visit.    Physical Exam     Behavioral Health Psychotherapy Progress Note    Psychotherapy Provided: Individual Psychotherapy     1. Depression, major, recurrent, moderate (HCC)        2. Generalized anxiety disorder            Goals addressed in session: Goal 1     DATA: Met with Stephanie for her scheduled individual session. She states that she has been feeling an increase in physical health symptoms. We discussed the connection between physical and mental stressors. We discussed the benefits of creating a schedule for her daily activities to help her set and achieve goals. This clinician encouraged her to put some thoughts down on paper-- as writing appears to be a helpful way for her to manage her emotions and track her progress. She states that she will work on developing this plan. She states that she did not follow up on looking at the [facility]; however, she states that she is still interested in this option. She agreed to do this prior to the next session. Because of my upcoming vacation, Stephanie asked who she could reach out to, in an emergency. This clinician validated her concerns and also provided her with encouragement, as she has been effectively able to manage difficult situations (e.g., the death of her grandmother) with little " "external intervention. I did agree to send her the information for the [facility].     During this session, this clinician used the following therapeutic modalities: Client-centered Therapy, Dialectical Behavior Therapy, Mindfulness-based Strategies, Motivational Interviewing, Solution-Focused Therapy, and Supportive Psychotherapy    Substance Abuse was not addressed during this session. If the client is diagnosed with a co-occurring substance use disorder, please indicate any changes in the frequency or amount of use: n/a . Stage of change for addressing substance use diagnoses: No substance use/Not applicable    ASSESSMENT:  Stephanie Schneider presents with a Euthymic/ normal mood.     her affect is Normal range and intensity, which is congruent, with her mood and the content of the session. The client has made progress on their goals.    Stephanie Schneider presents with a minimal risk of suicide, minimal risk of self-harm, and minimal risk of harm to others.    For any risk assessment that surpasses a \"low\" rating, a safety plan must be developed.    A safety plan was indicated: no  If yes, describe in detail n/a    PLAN: Between sessions, Stephanie Schneider will continue to monitor her moods. She will reach out to the [facility] to explore the programming that they have. She will work on developing a written schedule for herself, so she can make goals and achieve them. At the next session, the therapist will use Client-centered Therapy, Dialectical Behavior Therapy, Mindfulness-based Strategies, Motivational Interviewing, Solution-Focused Therapy, and Supportive Psychotherapy to address her mood regulation and relationships.    Behavioral Health Treatment Plan and Discharge Planning: Stephanie Schneider is aware of and agrees to continue to work on their treatment plan. They have identified and are working toward their discharge goals. yes    Visit start and stop times:    06/07/24  Start Time: 0811  Stop Time: " 0905  Total Visit Time: 54 minutes

## 2024-06-15 LAB
APOB+LDLR+PCSK9 GENE MUT ANL BLD/T: NOT DETECTED
BRCA1+BRCA2 DEL+DUP + FULL MUT ANL BLD/T: NOT DETECTED
MLH1+MSH2+MSH6+PMS2 GN DEL+DUP+FUL M: NOT DETECTED

## 2024-06-17 ENCOUNTER — TELEPHONE (OUTPATIENT)
Age: 34
End: 2024-06-17

## 2024-06-17 ENCOUNTER — OFFICE VISIT (OUTPATIENT)
Dept: FAMILY MEDICINE CLINIC | Facility: CLINIC | Age: 34
End: 2024-06-17
Payer: COMMERCIAL

## 2024-06-17 VITALS
HEIGHT: 63 IN | TEMPERATURE: 97.2 F | OXYGEN SATURATION: 98 % | BODY MASS INDEX: 46.07 KG/M2 | WEIGHT: 260 LBS | SYSTOLIC BLOOD PRESSURE: 130 MMHG | DIASTOLIC BLOOD PRESSURE: 90 MMHG | HEART RATE: 90 BPM

## 2024-06-17 DIAGNOSIS — B35.2 TINEA MANUS: Primary | ICD-10-CM

## 2024-06-17 DIAGNOSIS — E66.01 CLASS 3 SEVERE OBESITY DUE TO EXCESS CALORIES WITH SERIOUS COMORBIDITY AND BODY MASS INDEX (BMI) OF 45.0 TO 49.9 IN ADULT (HCC): ICD-10-CM

## 2024-06-17 PROBLEM — E66.813 CLASS 3 SEVERE OBESITY DUE TO EXCESS CALORIES WITH BODY MASS INDEX (BMI) OF 45.0 TO 49.9 IN ADULT (HCC): Status: ACTIVE | Noted: 2024-06-17

## 2024-06-17 PROCEDURE — 99214 OFFICE O/P EST MOD 30 MIN: CPT | Performed by: FAMILY MEDICINE

## 2024-06-17 RX ORDER — PHENTERMINE HYDROCHLORIDE 37.5 MG/1
37.5 TABLET ORAL DAILY
Qty: 30 TABLET | Refills: 3 | Status: SHIPPED | OUTPATIENT
Start: 2024-06-17

## 2024-06-17 NOTE — PROGRESS NOTES
"Assessment/Plan: Patient will start using Mycolog-II cream for tinea Supriya as well as starting phentermine for obesity.  Guidance given in this regard.  Options given.  Patient will follow-up in 4 months       Diagnoses and all orders for this visit:    Tinea manus  -     nystatin-triamcinolone (MYCOLOG-II) cream; Apply topically 2 (two) times a day    Class 3 severe obesity due to excess calories with serious comorbidity and body mass index (BMI) of 45.0 to 49.9 in adult ([facility])  -     phentermine (ADIPEX-P) 37.5 MG tablet; Take 1 tablet (37.5 mg total) by mouth in the morning            Subjective:        Patient ID: Cruz Schneider is a 33 y.o. female.      Patient is here with bumps on fingers bilateral hands over the past week.  Some pruritus noted.  This is reoccurring.  No fevers.patient also concerned about weight.  No treatment use.          The following portions of the patient's history were reviewed and updated as appropriate: allergies, current medications, past family history, past medical history, past social history, past surgical history and problem list.      Review of Systems   Constitutional: Negative.    HENT: Negative.     Eyes: Negative.    Respiratory: Negative.     Cardiovascular: Negative.    Gastrointestinal: Negative.    Endocrine: Negative.    Genitourinary: Negative.    Musculoskeletal: Negative.    Skin:  Positive for rash.   Allergic/Immunologic: Negative.    Neurological: Negative.    Hematological: Negative.    Psychiatric/Behavioral: Negative.             Objective:               /90 (BP Location: Right arm, Patient Position: Sitting, Cuff Size: Standard)   Pulse 90   Temp (!) 97.2 °F (36.2 °C) (Temporal)   Ht 5' 3\" (1.6 m)   Wt 118 kg (260 lb)   SpO2 98%   BMI 46.06 kg/m²          Physical Exam  Vitals and nursing note reviewed.   Constitutional:       General: She is not in acute distress.     Appearance: Normal appearance. She is not ill-appearing, toxic-appearing or " diaphoretic.   HENT:      Head: Normocephalic and atraumatic.      Right Ear: Tympanic membrane, ear canal and external ear normal. There is no impacted cerumen.      Left Ear: Tympanic membrane, ear canal and external ear normal. There is no impacted cerumen.      Nose: Nose normal. No congestion or rhinorrhea.   Eyes:      General: No scleral icterus.        Right eye: No discharge.         Left eye: No discharge.   Neck:      Vascular: No carotid bruit.   Cardiovascular:      Rate and Rhythm: Normal rate and regular rhythm.      Pulses: Normal pulses.      Heart sounds: Normal heart sounds. No murmur heard.     No friction rub. No gallop.   Pulmonary:      Effort: Pulmonary effort is normal. No respiratory distress.      Breath sounds: Normal breath sounds. No stridor. No wheezing, rhonchi or rales.   Chest:      Chest wall: No tenderness.   Musculoskeletal:         General: No swelling, tenderness, deformity or signs of injury. Normal range of motion.      Cervical back: Normal range of motion and neck supple. No rigidity. No muscular tenderness.      Right lower leg: No edema.      Left lower leg: No edema.   Lymphadenopathy:      Cervical: No cervical adenopathy.   Skin:     General: Skin is warm and dry.      Capillary Refill: Capillary refill takes less than 2 seconds.      Coloration: Skin is not jaundiced.      Findings: Rash present. No bruising, erythema or lesion.      Comments: Small flesh-colored bumps on bilateral fingers.   Neurological:      Mental Status: She is alert and oriented to person, place, and time. Mental status is at baseline.      Cranial Nerves: No cranial nerve deficit.      Sensory: No sensory deficit.      Motor: No weakness.      Coordination: Coordination normal.      Gait: Gait normal.   Psychiatric:         Mood and Affect: Mood normal.         Behavior: Behavior normal.         Thought Content: Thought content normal.         Judgment: Judgment normal.

## 2024-06-18 ENCOUNTER — OFFICE VISIT (OUTPATIENT)
Dept: DENTISTRY | Facility: CLINIC | Age: 34
End: 2024-06-18

## 2024-06-18 VITALS — SYSTOLIC BLOOD PRESSURE: 139 MMHG | DIASTOLIC BLOOD PRESSURE: 92 MMHG | HEART RATE: 83 BPM

## 2024-06-18 DIAGNOSIS — Z01.20 ENCOUNTER FOR DENTAL EXAMINATION: Primary | ICD-10-CM

## 2024-06-18 PROCEDURE — D1110 PROPHYLAXIS - ADULT: HCPCS

## 2024-06-18 NOTE — DENTAL PROCEDURE DETAILS
Reviewed medical history   ASA II   Prophylaxis completed with ultrasonic  and hand instrumentation.  Soft plaque removed and supragingival calculus removed from entire dentition .  Polished with pumice pre-request patient plain no flavor paste &  prophy cup .  Flossed and provided Oral Health Instructions.  Demonstrated proper brushing and flossing technique.  Patient left satisfied and ambulatory.  I reviewed OH w/patient and mom, recommend check out vitamin shop for plan dental products. Patient was fine w/ peroxide diluted with water for pre-procedural rinse.     Give patient breaks in between TMJ discomfort.     NV: 1 Restorative #24,25,26 class V & #27 class V & Distal   NV: 2 6MRC no BW   No exam today

## 2024-06-20 ENCOUNTER — TELEMEDICINE (OUTPATIENT)
Dept: BEHAVIORAL/MENTAL HEALTH CLINIC | Facility: CLINIC | Age: 34
End: 2024-06-20
Payer: COMMERCIAL

## 2024-06-20 DIAGNOSIS — F41.1 GENERALIZED ANXIETY DISORDER: Chronic | ICD-10-CM

## 2024-06-20 DIAGNOSIS — F33.1 DEPRESSION, MAJOR, RECURRENT, MODERATE (HCC): Primary | Chronic | ICD-10-CM

## 2024-06-20 PROCEDURE — 90837 PSYTX W PT 60 MINUTES: CPT | Performed by: SOCIAL WORKER

## 2024-06-20 NOTE — PSYCH
Virtual Regular Visit    Verification of patient location:    Patient is located at Home in the following state in which I hold an active license [place]      Assessment/Plan:    Problem List Items Addressed This Visit          Behavioral Health    Generalized anxiety disorder (Chronic)    Depression, major, recurrent, moderate (HCC) - Primary (Chronic)       Goals addressed in session: Goal 1          Reason for visit is No chief complaint on file.       Encounter provider APARNA Fox      Recent Visits  Date Type Provider Dept   06/17/24 Office Visit Oni Beckham DO [facility]   Showing recent visits within past 7 days and meeting all other requirements  Future Appointments  No visits were found meeting these conditions.  Showing future appointments within next 150 days and meeting all other requirements       The patient was identified by name and date of birth. Cruz Schneider was informed that this is a telemedicine visit and that the visit is being conducted throughthe Epic Embedded platform. She agrees to proceed..  My office door was closed. No one else was in the room.  She acknowledged consent and understanding of privacy and security of the video platform. The patient has agreed to participate and understands they can discontinue the visit at any time.    Patient is aware this is a billable service.     Subjective  Cruz Schneider is a 33 y.o. female.      HPI     Past Medical History:   Diagnosis Date    Abnormal Pap smear of cervix     Allergic     Anxiety     Arthritis     Dental caries     Depression     Fibromyalgia, primary     GERD (gastroesophageal reflux disease)     Hypertension     IBS (irritable bowel syndrome)     Migraine     Obesity     Vitamin B12 deficiency        Past Surgical History:   Procedure Laterality Date    COLONOSCOPY N/A 5/8/2018    Procedure: COLONOSCOPY;  Surgeon: Stephanie Alston MD;  Location: [facility];  Service: Gastroenterology    AZ EXC B9  LESION MRGN XCP SK TG S/N/H/F/G > 4.0CM N/A 7/1/2021    Procedure: EXCISION OF PILAR SCALP CYST X 2 AND RIGHT INNER GROIN NEVVUS;  Surgeon: Denis Barron MD;  Location:  MAIN OR;  Service: Plastics    AZ REPAIR COMPLEX SCALP/ARM/LEG 2.6-7.5 CM N/A 7/1/2021    Procedure: CLOSURE WOUND SCALP X 2 AND RIGHT INNER GROIN;  Surgeon: Denis Barron MD;  Location:  MAIN OR;  Service: Plastics    TONSILLECTOMY      WISDOM TOOTH EXTRACTION         Current Outpatient Medications   Medication Sig Dispense Refill    ALPRAZolam (XANAX) 0.5 mg tablet Take 1 tablet (0.5 mg total) by mouth 3 (three) times a day as needed for anxiety 90 tablet 2    ascorbic acid (VITAMIN C) 500 MG tablet Take 500 mg by mouth daily      Botox 200 units SOLR       buPROPion (WELLBUTRIN XL) 300 mg 24 hr tablet Take 1 tablet (300 mg total) by mouth daily 90 tablet 0    Cholecalciferol (Vitamin D3) 50 MCG (2000 UT) capsule Take 1 capsule (2,000 Units total) by mouth daily 90 capsule 1    Cholecalciferol (Vitamin D3) 50 MCG (2000 UT) TABS       Cyanocobalamin (Vitamin B-12) 500 MCG SUBL Place 1 tablet (500 mcg total) under the tongue in the morning 30 tablet 2    cyanocobalamin 1,000 mcg/mL 1 IM injection every month 3 mL 3    dicyclomine (BENTYL) 20 mg tablet Take 1 tablet (20 mg total) by mouth 2 (two) times a day (Patient not taking: Reported on 6/17/2024) 20 tablet 0    escitalopram (LEXAPRO) 20 mg tablet Take 1 tablet (20 mg total) by mouth daily 90 tablet 0    esomeprazole (NexIUM) 40 MG capsule Take 40 mg by mouth      etonogestrel-ethinyl estradiol (NuvaRing) 0.12-0.015 MG/24HR vaginal ring Insert ring for 21 days then remove for one week. 3 each 4    FeroSul 325 (65 Fe) MG tablet Take 1 tablet (325 mg total) by mouth in the morning 90 tablet 1    gabapentin (Neurontin) 300 mg capsule Take 1 capsule (300 mg total) by mouth 3 (three) times a day 90 capsule 5    Galcanezumab-gnlm (Emgality) 120 MG/ML SOAJ One ml subcutaneous on the right thigh  and 1 ml subcutaneous on the left thigh at the same time for 1 dose 2 mL 0    Galcanezumab-gnlm (Emgality) 120 MG/ML SOAJ 30 days after loading dose, inject 1 pen subq every 30 days. (Patient not taking: Reported on 6/17/2024) 1 mL 11    indomethacin (INDOCIN) 25 mg capsule 1-2 tabs BID prn severe headache with food. Hold toradol. 30 capsule 6    inFLIXimab-axxq (Avsola) 100 mg SOLR Inject 5 mg/kg into a catheter in a vein (Patient not taking: Reported on 6/17/2024)      loperamide (IMODIUM) 2 mg capsule Take 1 capsule (2 mg total) by mouth 4 (four) times a day as needed for diarrhea 12 capsule 0    magnesium Oxide (MAG-OX) 400 mg TABS Take 1 tablet (400 mg total) by mouth daily 30 tablet 5    methocarbamol (ROBAXIN) 500 mg tablet       metoprolol succinate (Toprol XL) 25 mg 24 hr tablet Take 1 tablet (25 mg total) by mouth daily (Patient not taking: Reported on 5/17/2024) 90 tablet 3    mexiletine (MEXITIL) 150 mg capsule Take 1 capsule (150 mg total) by mouth 2 (two) times a day (Patient not taking: Reported on 6/17/2024) 180 capsule 0    mupirocin (BACTROBAN) 2 % ointment Apply topically 2 (two) times a day 15 g 0    nystatin-triamcinolone (MYCOLOG-II) cream Apply topically 2 (two) times a day 30 g 1    OLANZapine (ZyPREXA) 5 mg tablet Take 1 tablet (5 mg total) by mouth daily at bedtime Do not take with reglan. 30 tablet 2    onabotulinumtoxin A (BOTOX) 100 units Inject as directed      ondansetron (ZOFRAN) 4 mg tablet Take 1 tablet (4 mg total) by mouth every 6 (six) hours 30 tablet 2    phentermine (ADIPEX-P) 37.5 MG tablet Take 1 tablet (37.5 mg total) by mouth in the morning 30 tablet 3    prazosin (MINIPRESS) 2 mg capsule Take 1 capsule (2 mg total) by mouth daily at bedtime 90 capsule 0    Trudhesa 0.725 MG/ACT AERS 1 spray in each nostril for total dose of 1.45mg, may repeat 1 dose after 1 hour. Max 2 doses per 24 hours and 3 doses per week. 12 mL 6     Current Facility-Administered Medications  "  Medication Dose Route Frequency Provider Last Rate Last Admin    cyanocobalamin injection 1,000 mcg  1,000 mcg Intramuscular Q30 Days Oni Beckham, DO   1,000 mcg at 03/14/24 1450        Allergies   Allergen Reactions    Cimzia [Certolizumab Pegol] Swelling    Desvenlafaxine      Other reaction(s): state of confusion    Ixekizumab Vomiting    Seasonal Ic [Octacosanol] Nasal Congestion    Valproic Acid Other (See Comments)     Other reaction(s): dilated pupils, \"schizi\"    Voltaren [Diclofenac] Swelling    Tizanidine Anxiety       Review of Systems    Video Exam    There were no vitals filed for this visit.    Physical Exam     Behavioral Health Psychotherapy Progress Note    Psychotherapy Provided: Individual Psychotherapy     1. Depression, major, recurrent, moderate (HCC)        2. Generalized anxiety disorder            Goals addressed in session: Goal 1     DATA: Met with Stephanie for her scheduled individual session. She states, \"I made it through last week and didn't need to call anyone.\" We discussed her fears of going through the week without a therapy session. This clinician provided validation and positive reinforcement for her use of positive coping skills. She states that she is having a flare up of her physical health issues. She states that she has more severe symptoms-- akin to having the flu-- and feels that she is not able to do the things that she really wants to do. We discussed the connection between her emotional health and her physical health. She is able to acknowledge that the stress related to the loss of her grandmother, the changes that are happening within the household, and her overall physical pain, are all connected to one another. She states that she feels that her moods are somewhat sad, but are improving overall. Stephanie discussed her goals of getting more active and finding activities that are interesting to her. Stephanie states that she looked up the [employer], and she reports " "that she is interested in learning more and possibly attending. Stephanie discussed her friendship with Boni, and her continued difficulties with wanting her friend to provide more support than she is able to do. This clinician continues to encourage her to broaden her social supports.     During this session, this clinician used the following therapeutic modalities: Client-centered Therapy, Dialectical Behavior Therapy, Mindfulness-based Strategies, Motivational Interviewing, Solution-Focused Therapy, and Supportive Psychotherapy    Substance Abuse was not addressed during this session. If the client is diagnosed with a co-occurring substance use disorder, please indicate any changes in the frequency or amount of use: n/a. Stage of change for addressing substance use diagnoses: No substance use/Not applicable    ASSESSMENT:  Stephanie Schneider presents with a Euthymic/ normal mood.     her affect is Normal range and intensity, which is congruent, with her mood and the content of the session. The client has not made progress on their goals, since her last session.    Stephanie Schneider presents with a minimal risk of suicide, minimal risk of self-harm, and minimal risk of harm to others.    For any risk assessment that surpasses a \"low\" rating, a safety plan must be developed.    A safety plan was indicated: no  If yes, describe in detail n/a    PLAN: Between sessions, Stephanie Schneider will continue to monitor her moods and increase her use of journaling to help her self-regulate. At the next session, the therapist will use Client-centered Therapy, Dialectical Behavior Therapy, Mindfulness-based Strategies, Motivational Interviewing, Solution-Focused Therapy, and Supportive Psychotherapy to address her mood regulation and relationship concerns.    Behavioral Health Treatment Plan and Discharge Planning: Stephanie Schneider is aware of and agrees to continue to work on their treatment plan. They have identified and are working " toward their discharge goals. yes    Visit start and stop times:    06/18/24  Start Time: 0814  Stop Time: 0907  Total Visit Time: 53 minutes

## 2024-06-22 NOTE — TELEPHONE ENCOUNTER
PA for ADIPEX-P     Submitted via    [x]CMM-KEY [id number]  [][employer]-Case ID #   []Faxed to plan   []Other website   []Phone call Case ID #     Office notes sent, clinical questions answered. Awaiting determination    Turnaround time for your insurance to make a decision on your Prior Authorization can take 7-21 business days.

## 2024-06-23 NOTE — TELEPHONE ENCOUNTER
PA for Adipex-P Denied    Reason:          Message sent to office clinical pool Yes    Denial letter scanned into Media Yes    Appeal started No ( Provider will need to decide if appeal is warranted and send clinical documentation to PA team for initiation.)    **Please follow up with your patient regarding denial and next steps**

## 2024-06-27 ENCOUNTER — TELEMEDICINE (OUTPATIENT)
Dept: BEHAVIORAL/MENTAL HEALTH CLINIC | Facility: CLINIC | Age: 34
End: 2024-06-27
Payer: COMMERCIAL

## 2024-06-27 ENCOUNTER — OFFICE VISIT (OUTPATIENT)
Dept: FAMILY MEDICINE CLINIC | Facility: CLINIC | Age: 34
End: 2024-06-27
Payer: COMMERCIAL

## 2024-06-27 VITALS
DIASTOLIC BLOOD PRESSURE: 86 MMHG | OXYGEN SATURATION: 98 % | WEIGHT: 259.3 LBS | HEART RATE: 84 BPM | HEIGHT: 63 IN | BODY MASS INDEX: 45.95 KG/M2 | SYSTOLIC BLOOD PRESSURE: 122 MMHG | TEMPERATURE: 98.1 F

## 2024-06-27 DIAGNOSIS — M54.6 ACUTE BILATERAL THORACIC BACK PAIN: Primary | ICD-10-CM

## 2024-06-27 DIAGNOSIS — M25.50 ARTHRALGIA OF MULTIPLE SITES: ICD-10-CM

## 2024-06-27 DIAGNOSIS — M79.7 FIBROMYALGIA SYNDROME: ICD-10-CM

## 2024-06-27 DIAGNOSIS — F41.1 GENERALIZED ANXIETY DISORDER: Chronic | ICD-10-CM

## 2024-06-27 DIAGNOSIS — F33.1 DEPRESSION, MAJOR, RECURRENT, MODERATE (HCC): Primary | Chronic | ICD-10-CM

## 2024-06-27 DIAGNOSIS — G89.29 CHRONIC LOW BACK PAIN, UNSPECIFIED BACK PAIN LATERALITY, UNSPECIFIED WHETHER SCIATICA PRESENT: ICD-10-CM

## 2024-06-27 DIAGNOSIS — E66.01 CLASS 3 SEVERE OBESITY DUE TO EXCESS CALORIES WITH SERIOUS COMORBIDITY AND BODY MASS INDEX (BMI) OF 45.0 TO 49.9 IN ADULT (HCC): ICD-10-CM

## 2024-06-27 DIAGNOSIS — M54.50 CHRONIC LOW BACK PAIN, UNSPECIFIED BACK PAIN LATERALITY, UNSPECIFIED WHETHER SCIATICA PRESENT: ICD-10-CM

## 2024-06-27 PROCEDURE — 90837 PSYTX W PT 60 MINUTES: CPT | Performed by: SOCIAL WORKER

## 2024-06-27 PROCEDURE — 99214 OFFICE O/P EST MOD 30 MIN: CPT | Performed by: FAMILY MEDICINE

## 2024-06-27 RX ORDER — CELECOXIB 200 MG/1
200 CAPSULE ORAL 2 TIMES DAILY
Qty: 60 CAPSULE | Refills: 3 | Status: SHIPPED | OUTPATIENT
Start: 2024-06-27

## 2024-06-27 NOTE — PROGRESS NOTES
Assessment/Plan: Patient will increase Robaxin and gabapentin as directed.  Start Celebrex as directed.  Patient will be referred to rheumatology.  Follow-up as needed.       Diagnoses and all orders for this visit:    Acute bilateral thoracic back pain  -     celecoxib (CeleBREX) 200 mg capsule; Take 1 capsule (200 mg total) by mouth 2 (two) times a day    Chronic low back pain, unspecified back pain laterality, unspecified whether sciatica present  -     celecoxib (CeleBREX) 200 mg capsule; Take 1 capsule (200 mg total) by mouth 2 (two) times a day    Fibromyalgia syndrome  -     celecoxib (CeleBREX) 200 mg capsule; Take 1 capsule (200 mg total) by mouth 2 (two) times a day  -     Ambulatory Referral to Rheumatology; Future    Arthralgia of multiple sites  -     celecoxib (CeleBREX) 200 mg capsule; Take 1 capsule (200 mg total) by mouth 2 (two) times a day  -     Ambulatory Referral to Rheumatology; Future    Class 3 severe obesity due to excess calories with serious comorbidity and body mass index (BMI) of 45.0 to 49.9 in adult (HCC)            Subjective:        Patient ID: Cruz Schneider is a 33 y.o. female.      Patient is here with bilateral posterior rib pain along with left leg pain which is worse over the past 2 weeks.  Did not start aquatic therapy yet.  No trauma.  No rash.  No cough or sputum production.  Patient did reach out to rheumatology.  Patient using heat and ice and also doing exercises.  Patient using gabapentin.  Patient also using Tylenol.  No change urination defecation.  Patient also using Robaxin          The following portions of the patient's history were reviewed and updated as appropriate: allergies, current medications, past family history, past medical history, past social history, past surgical history and problem list.      Review of Systems   Constitutional: Negative.    HENT: Negative.     Eyes: Negative.    Respiratory: Negative.     Cardiovascular: Negative.   "  Gastrointestinal: Negative.    Endocrine: Negative.    Genitourinary: Negative.    Musculoskeletal:  Positive for arthralgias, back pain, gait problem and myalgias.   Skin: Negative.    Allergic/Immunologic: Negative.    Hematological: Negative.    Psychiatric/Behavioral: Negative.             Objective:               /86 (BP Location: Right arm, Patient Position: Sitting, Cuff Size: Large)   Pulse 84   Temp 98.1 °F (36.7 °C) (Temporal)   Ht 5' 3\" (1.6 m)   Wt 118 kg (259 lb 4.8 oz)   SpO2 98%   BMI 45.93 kg/m²          Physical Exam  Vitals and nursing note reviewed.   Constitutional:       General: She is not in acute distress.     Appearance: She is ill-appearing. She is not toxic-appearing or diaphoretic.   HENT:      Head: Normocephalic and atraumatic.      Right Ear: Tympanic membrane, ear canal and external ear normal. There is no impacted cerumen.      Left Ear: Tympanic membrane, ear canal and external ear normal. There is no impacted cerumen.      Nose: Nose normal. No congestion or rhinorrhea.      Mouth/Throat:      Mouth: Mucous membranes are moist.      Pharynx: No oropharyngeal exudate or posterior oropharyngeal erythema.   Eyes:      General: No scleral icterus.        Right eye: No discharge.         Left eye: No discharge.   Neck:      Vascular: No carotid bruit.   Cardiovascular:      Rate and Rhythm: Normal rate and regular rhythm.      Pulses: Normal pulses.      Heart sounds: Normal heart sounds. No murmur heard.     No friction rub. No gallop.   Pulmonary:      Effort: Pulmonary effort is normal. No respiratory distress.      Breath sounds: Normal breath sounds. No stridor. No wheezing, rhonchi or rales.   Chest:      Chest wall: No tenderness.   Musculoskeletal:         General: Tenderness present. No swelling, deformity or signs of injury.      Cervical back: Normal range of motion and neck supple. No rigidity. No muscular tenderness.      Right lower leg: No edema.      Left " lower leg: No edema.      Comments: Pain with palpation thoracic region left greater than right.  Patient also with pain in lumbar region on the left.  Negative straight leg raise.   Lymphadenopathy:      Cervical: No cervical adenopathy.   Skin:     General: Skin is warm and dry.      Capillary Refill: Capillary refill takes less than 2 seconds.      Coloration: Skin is not jaundiced.      Findings: No bruising, erythema, lesion or rash.   Neurological:      Mental Status: She is alert and oriented to person, place, and time.      Cranial Nerves: No cranial nerve deficit.      Sensory: No sensory deficit.      Motor: No weakness.      Coordination: Coordination normal.      Gait: Gait normal.   Psychiatric:         Mood and Affect: Mood normal.         Behavior: Behavior normal.         Thought Content: Thought content normal.         Judgment: Judgment normal.

## 2024-06-27 NOTE — PSYCH
Virtual Regular Visit    Verification of patient location:    Patient is located at Home in the following state in which I hold an active license [place]      Assessment/Plan:    Problem List Items Addressed This Visit          Behavioral Health    Generalized anxiety disorder (Chronic)    Depression, major, recurrent, moderate (HCC) - Primary (Chronic)       Goals addressed in session: Goal 1          Reason for visit is   Chief Complaint   Patient presents with    Virtual Regular Visit          Encounter provider APARNA Fox      Recent Visits  Date Type Provider Dept   06/27/24 Office Visit Oni Beckham DO [facility]   06/27/24 Telemedicine APARNA Fox  [facility] [place]   Showing recent visits within past 7 days and meeting all other requirements  Future Appointments  No visits were found meeting these conditions.  Showing future appointments within next 150 days and meeting all other requirements       The patient was identified by name and date of birth. Cruz Schneider was informed that this is a telemedicine visit and that the visit is being conducted throughthe Epic Embedded platform. She agrees to proceed..  My office door was closed. No one else was in the room.  She acknowledged consent and understanding of privacy and security of the video platform. The patient has agreed to participate and understands they can discontinue the visit at any time.    Patient is aware this is a billable service.     Subjective  Cruz Schneider is a 33 y.o. female.      HPI     Past Medical History:   Diagnosis Date    Abnormal Pap smear of cervix     Allergic     Anxiety     Arthritis     Dental caries     Depression     Fibromyalgia, primary     GERD (gastroesophageal reflux disease)     Hypertension     IBS (irritable bowel syndrome)     Migraine     Obesity     Vitamin B12 deficiency        Past Surgical History:   Procedure Laterality Date    COLONOSCOPY N/A 5/8/2018     Procedure: COLONOSCOPY;  Surgeon: Stephanie Alston MD;  Location: [facility] GI [facility];  Service: Gastroenterology    NE EXC B9 LESION MRGN XCP SK TG S/N/H/F/G > 4.0CM N/A 7/1/2021    Procedure: EXCISION OF PILAR SCALP CYST X 2 AND RIGHT INNER GROIN NEVVUS;  Surgeon: Denis Barron MD;  Location:  MAIN OR;  Service: Plastics    NE REPAIR COMPLEX SCALP/ARM/LEG 2.6-7.5 CM N/A 7/1/2021    Procedure: CLOSURE WOUND SCALP X 2 AND RIGHT INNER GROIN;  Surgeon: Denis Barron MD;  Location:  MAIN OR;  Service: Plastics    TONSILLECTOMY      WISDOM TOOTH EXTRACTION         Current Outpatient Medications   Medication Sig Dispense Refill    ALPRAZolam (XANAX) 0.5 mg tablet Take 1 tablet (0.5 mg total) by mouth 3 (three) times a day as needed for anxiety 90 tablet 2    ascorbic acid (VITAMIN C) 500 MG tablet Take 500 mg by mouth daily      Botox 200 units SOLR       buPROPion (WELLBUTRIN XL) 300 mg 24 hr tablet Take 1 tablet (300 mg total) by mouth daily 90 tablet 0    celecoxib (CeleBREX) 200 mg capsule Take 1 capsule (200 mg total) by mouth 2 (two) times a day 60 capsule 3    Cholecalciferol (Vitamin D3) 50 MCG (2000 UT) capsule Take 1 capsule (2,000 Units total) by mouth daily 90 capsule 1    Cholecalciferol (Vitamin D3) 50 MCG (2000 UT) TABS       Cyanocobalamin (Vitamin B-12) 500 MCG SUBL Place 1 tablet (500 mcg total) under the tongue in the morning 30 tablet 2    cyanocobalamin 1,000 mcg/mL 1 IM injection every month 3 mL 3    escitalopram (LEXAPRO) 20 mg tablet Take 1 tablet (20 mg total) by mouth daily 90 tablet 0    esomeprazole (NexIUM) 40 MG capsule Take 40 mg by mouth      etonogestrel-ethinyl estradiol (NuvaRing) 0.12-0.015 MG/24HR vaginal ring Insert ring for 21 days then remove for one week. 3 each 4    FeroSul 325 (65 Fe) MG tablet Take 1 tablet (325 mg total) by mouth in the morning 90 tablet 1    gabapentin (Neurontin) 300 mg capsule Take 1 capsule (300 mg total) by mouth 3 (three) times a day 90 capsule 5     "Galcanezumab-gnlm (Emgality) 120 MG/ML SOAJ One ml subcutaneous on the right thigh and 1 ml subcutaneous on the left thigh at the same time for 1 dose 2 mL 0    indomethacin (INDOCIN) 25 mg capsule 1-2 tabs BID prn severe headache with food. Hold toradol. 30 capsule 6    loperamide (IMODIUM) 2 mg capsule Take 1 capsule (2 mg total) by mouth 4 (four) times a day as needed for diarrhea 12 capsule 0    magnesium Oxide (MAG-OX) 400 mg TABS Take 1 tablet (400 mg total) by mouth daily 30 tablet 5    methocarbamol (ROBAXIN) 500 mg tablet       mupirocin (BACTROBAN) 2 % ointment Apply topically 2 (two) times a day 15 g 0    nystatin-triamcinolone (MYCOLOG-II) cream Apply topically 2 (two) times a day 30 g 1    OLANZapine (ZyPREXA) 5 mg tablet Take 1 tablet (5 mg total) by mouth daily at bedtime Do not take with reglan. 30 tablet 2    onabotulinumtoxin A (BOTOX) 100 units Inject as directed      ondansetron (ZOFRAN) 4 mg tablet Take 1 tablet (4 mg total) by mouth every 6 (six) hours 30 tablet 2    phentermine (ADIPEX-P) 37.5 MG tablet Take 1 tablet (37.5 mg total) by mouth in the morning 30 tablet 3    prazosin (MINIPRESS) 2 mg capsule Take 1 capsule (2 mg total) by mouth daily at bedtime 90 capsule 0    Trudhesa 0.725 MG/ACT AERS 1 spray in each nostril for total dose of 1.45mg, may repeat 1 dose after 1 hour. Max 2 doses per 24 hours and 3 doses per week. 12 mL 6     Current Facility-Administered Medications   Medication Dose Route Frequency Provider Last Rate Last Admin    cyanocobalamin injection 1,000 mcg  1,000 mcg Intramuscular Q30 Days Oni Beckham DO   1,000 mcg at 03/14/24 1450        Allergies   Allergen Reactions    Cimzia [Certolizumab Pegol] Swelling    Desvenlafaxine      Other reaction(s): state of confusion    Ixekizumab Vomiting    Seasonal Ic [Octacosanol] Nasal Congestion    Valproic Acid Other (See Comments)     Other reaction(s): dilated pupils, \"schizi\"    Voltaren [Diclofenac] Swelling    Tizanidine " "Anxiety       Review of Systems    Video Exam    There were no vitals filed for this visit.    Physical Exam     Behavioral Health Psychotherapy Progress Note    Psychotherapy Provided: Individual Psychotherapy     1. Depression, major, recurrent, moderate (HCC)        2. Generalized anxiety disorder            Goals addressed in session: Goal 1     DATA: Met with Stephanie for her scheduled individual session. She states that she continues to experience a \"flare\" of her illness. She discussed the management of her pain and her attempts to continue to engage in movement exercises, even when she is not able to do very much. We discussed activities that she can participate in to increase her overall feeling of accomplishment. Stephanie reports that her mother has decided to rent out Stephanie's grandmother's portion of the home-- so they will be able to maintain the payment of the mortgage on the home. Stephanie states that her uncle has been assisting with some of the improvements in the home, to get it ready. Stephanie spent some time discussing her relationship with Jacqueline. She states that she is trying to accespt her friend for who she is and what she is able to give to Stephanie. This clinician continues to encourage Stephanie to get out of her home and explore other potential friendships-- to broaden her support network Stephanie continues to express interest in the [facility]. Stephanie will continue to follow up with her medical providers, as she I noting that she is not able to engage in any significant physical exercise, due to the exacerbation of her symptoms. This clinician continues to encourage her to find a balance-- so that she can remain as active as possible.     During this session, this clinician used the following therapeutic modalities: Client-centered Therapy, Dialectical Behavior Therapy, Mindfulness-based Strategies, Motivational Interviewing, Solution-Focused Therapy, and Supportive Psychotherapy    Substance Abuse was " "not addressed during this session. If the client is diagnosed with a co-occurring substance use disorder, please indicate any changes in the frequency or amount of use: n/a. Stage of change for addressing substance use diagnoses: No substance use/Not applicable    ASSESSMENT:  Stephanie Schneider presents with a Euthymic/ normal mood.     her affect is Normal range and intensity, which is congruent, with her mood and the content of the session. The client has not made progress on their goals since our last session.    Stephanie Schneider presents with a minimal risk of suicide, minimal risk of self-harm, and minimal risk of harm to others.    For any risk assessment that surpasses a \"low\" rating, a safety plan must be developed.    A safety plan was indicated: no  If yes, describe in detail n/a    PLAN: Between sessions, Stephanie Schneider will continue to monitor her moods. She will continue to set/maintain boundaries within her relationships. Stephanie will seek more information about the [facility]. At the next session, the therapist will use Client-centered Therapy, Dialectical Behavior Therapy, Mindfulness-based Strategies, Motivational Interviewing, Solution-Focused Therapy, and Supportive Psychotherapy to address her mood regulation and relationship concerns.    Behavioral Health Treatment Plan and Discharge Planning: Stephanie Schneider is aware of and agrees to continue to work on their treatment plan. They have identified and are working toward their discharge goals. yes    Visit start and stop times:    06/27/24  Start Time: 0802  Stop Time: 0857  Total Visit Time: 55 minutes        "

## 2024-06-29 ENCOUNTER — PATIENT MESSAGE (OUTPATIENT)
Dept: FAMILY MEDICINE CLINIC | Facility: CLINIC | Age: 34
End: 2024-06-29

## 2024-07-01 ENCOUNTER — TELEPHONE (OUTPATIENT)
Age: 34
End: 2024-07-01

## 2024-07-01 DIAGNOSIS — M54.16 LUMBAR RADICULOPATHY: Primary | ICD-10-CM

## 2024-07-01 RX ORDER — METHOCARBAMOL 500 MG/1
500 TABLET, FILM COATED ORAL 3 TIMES DAILY
Qty: 270 TABLET | Refills: 1 | Status: SHIPPED | OUTPATIENT
Start: 2024-07-01 | End: 2024-12-28

## 2024-07-02 ENCOUNTER — TELEMEDICINE (OUTPATIENT)
Dept: BEHAVIORAL/MENTAL HEALTH CLINIC | Facility: CLINIC | Age: 34
End: 2024-07-02

## 2024-07-02 DIAGNOSIS — F41.1 GENERALIZED ANXIETY DISORDER: Primary | Chronic | ICD-10-CM

## 2024-07-02 NOTE — PSYCH
This is a test note.   The client provided consent to test this procedure within her medical record.   The client was not scheduled for, nor did she miss this appointment.   It will be deleted (if possible), after going through the EOD process.

## 2024-07-02 NOTE — PSYCH
Test note. This client did not have a scheduled session.   She consented to use her chart for this test.   The note will be deleted (if possible).

## 2024-07-07 ENCOUNTER — HOSPITAL ENCOUNTER (EMERGENCY)
Facility: HOSPITAL | Age: 34
Discharge: HOME | End: 2024-07-07
Attending: EMERGENCY MEDICINE
Payer: COMMERCIAL

## 2024-07-07 ENCOUNTER — APPOINTMENT (EMERGENCY)
Dept: CT IMAGING | Facility: HOSPITAL | Age: 34
End: 2024-07-07
Payer: COMMERCIAL

## 2024-07-07 VITALS
DIASTOLIC BLOOD PRESSURE: 88 MMHG | RESPIRATION RATE: 18 BRPM | TEMPERATURE: 98.7 F | BODY MASS INDEX: 45.17 KG/M2 | WEIGHT: 255 LBS | SYSTOLIC BLOOD PRESSURE: 158 MMHG | OXYGEN SATURATION: 97 % | HEART RATE: 93 BPM

## 2024-07-07 DIAGNOSIS — R11.0 NAUSEA: ICD-10-CM

## 2024-07-07 DIAGNOSIS — R19.7 DIARRHEA: ICD-10-CM

## 2024-07-07 DIAGNOSIS — R10.9 ABDOMINAL PAIN: Primary | ICD-10-CM

## 2024-07-07 LAB
ALBUMIN SERPL BCG-MCNC: 3.7 G/DL (ref 3.5–5)
ALP SERPL-CCNC: 59 U/L (ref 34–104)
ALT SERPL W P-5'-P-CCNC: 18 U/L (ref 7–52)
ANION GAP SERPL CALCULATED.3IONS-SCNC: 10 MMOL/L (ref 4–13)
AST SERPL W P-5'-P-CCNC: 23 U/L (ref 13–39)
BASOPHILS # BLD AUTO: 0.03 THOUSANDS/ÂΜL (ref 0–0.1)
BASOPHILS NFR BLD AUTO: 0 % (ref 0–1)
BILIRUB SERPL-MCNC: 0.46 MG/DL (ref 0.2–1)
BILIRUB UR QL STRIP: NEGATIVE
BUN SERPL-MCNC: 5 MG/DL (ref 5–25)
CALCIUM SERPL-MCNC: 9.1 MG/DL (ref 8.4–10.2)
CHLORIDE SERPL-SCNC: 104 MMOL/L (ref 96–108)
CLARITY UR: CLEAR
CO2 SERPL-SCNC: 22 MMOL/L (ref 21–32)
COLOR UR: YELLOW
CREAT SERPL-MCNC: 0.64 MG/DL (ref 0.6–1.3)
EOSINOPHIL # BLD AUTO: 0.19 THOUSAND/ÂΜL (ref 0–0.61)
EOSINOPHIL NFR BLD AUTO: 3 % (ref 0–6)
ERYTHROCYTE [DISTWIDTH] IN BLOOD BY AUTOMATED COUNT: 12.8 % (ref 11.6–15.1)
EXT PREGNANCY TEST URINE: NEGATIVE
EXT. CONTROL: NORMAL
GFR SERPL CREATININE-BSD FRML MDRD: 117 ML/MIN/1.73SQ M
GLUCOSE SERPL-MCNC: 88 MG/DL (ref 65–140)
GLUCOSE UR STRIP-MCNC: NEGATIVE MG/DL
HCT VFR BLD AUTO: 37.7 % (ref 34.8–46.1)
HGB BLD-MCNC: 12.3 G/DL (ref 11.5–15.4)
HGB UR QL STRIP.AUTO: NEGATIVE
IMM GRANULOCYTES # BLD AUTO: 0.01 THOUSAND/UL (ref 0–0.2)
IMM GRANULOCYTES NFR BLD AUTO: 0 % (ref 0–2)
KETONES UR STRIP-MCNC: ABNORMAL MG/DL
LEUKOCYTE ESTERASE UR QL STRIP: NEGATIVE
LIPASE SERPL-CCNC: 16 U/L (ref 11–82)
LYMPHOCYTES # BLD AUTO: 2.85 THOUSANDS/ÂΜL (ref 0.6–4.47)
LYMPHOCYTES NFR BLD AUTO: 38 % (ref 14–44)
MCH RBC QN AUTO: 28.7 PG (ref 26.8–34.3)
MCHC RBC AUTO-ENTMCNC: 32.6 G/DL (ref 31.4–37.4)
MCV RBC AUTO: 88 FL (ref 82–98)
MONOCYTES # BLD AUTO: 0.58 THOUSAND/ÂΜL (ref 0.17–1.22)
MONOCYTES NFR BLD AUTO: 8 % (ref 4–12)
NEUTROPHILS # BLD AUTO: 3.83 THOUSANDS/ÂΜL (ref 1.85–7.62)
NEUTS SEG NFR BLD AUTO: 51 % (ref 43–75)
NITRITE UR QL STRIP: NEGATIVE
NRBC BLD AUTO-RTO: 0 /100 WBCS
PH UR STRIP.AUTO: 6.5 [PH]
PLATELET # BLD AUTO: 450 THOUSANDS/UL (ref 149–390)
PMV BLD AUTO: 8.9 FL (ref 8.9–12.7)
POTASSIUM SERPL-SCNC: 3.7 MMOL/L (ref 3.5–5.3)
PROT SERPL-MCNC: 7.1 G/DL (ref 6.4–8.4)
PROT UR STRIP-MCNC: NEGATIVE MG/DL
RBC # BLD AUTO: 4.28 MILLION/UL (ref 3.81–5.12)
SODIUM SERPL-SCNC: 136 MMOL/L (ref 135–147)
SP GR UR STRIP.AUTO: 1.02
UROBILINOGEN UR QL STRIP.AUTO: 0.2 E.U./DL
WBC # BLD AUTO: 7.49 THOUSAND/UL (ref 4.31–10.16)

## 2024-07-07 PROCEDURE — 96361 HYDRATE IV INFUSION ADD-ON: CPT

## 2024-07-07 PROCEDURE — 36415 COLL VENOUS BLD VENIPUNCTURE: CPT

## 2024-07-07 PROCEDURE — 81025 URINE PREGNANCY TEST: CPT

## 2024-07-07 PROCEDURE — 96374 THER/PROPH/DIAG INJ IV PUSH: CPT

## 2024-07-07 PROCEDURE — 99284 EMERGENCY DEPT VISIT MOD MDM: CPT

## 2024-07-07 PROCEDURE — 80053 COMPREHEN METABOLIC PANEL: CPT

## 2024-07-07 PROCEDURE — 83690 ASSAY OF LIPASE: CPT

## 2024-07-07 PROCEDURE — 81003 URINALYSIS AUTO W/O SCOPE: CPT

## 2024-07-07 PROCEDURE — 85025 COMPLETE CBC W/AUTO DIFF WBC: CPT

## 2024-07-07 RX ORDER — ONDANSETRON 2 MG/ML
4 INJECTION INTRAMUSCULAR; INTRAVENOUS ONCE
Status: COMPLETED | OUTPATIENT
Start: 2024-07-07 | End: 2024-07-07

## 2024-07-07 RX ORDER — KETOROLAC TROMETHAMINE 30 MG/ML
15 INJECTION, SOLUTION INTRAMUSCULAR; INTRAVENOUS ONCE
Status: COMPLETED | OUTPATIENT
Start: 2024-07-07 | End: 2024-07-07

## 2024-07-07 RX ADMIN — SODIUM CHLORIDE 1000 ML: 0.9 INJECTION, SOLUTION INTRAVENOUS at 18:09

## 2024-07-07 RX ADMIN — KETOROLAC TROMETHAMINE 15 MG: 30 INJECTION, SOLUTION INTRAMUSCULAR at 18:10

## 2024-07-07 RX ADMIN — ONDANSETRON 4 MG: 2 INJECTION INTRAMUSCULAR; INTRAVENOUS at 18:09

## 2024-07-07 NOTE — ED PROVIDER NOTES
History  Chief Complaint   Patient presents with    Abdominal Pain     Left lower quadrant pain, diarrhea, vomiting started yesterday.      Patient is a 34-year-old female with relevant past medical history of BESSY, arthritis, depression, fibromyalgia, GERD, hypertension, IBS, migraine, and obesity presenting with left lower quadrant pain, nausea, and diarrhea x 2 days. She started with sinus problems and diarrhea yesterday afternoon and has had 3 bowel movements in total since then. Her last bowel movement was just prior to arrival and was mushy. No blood. She has IBS and alternates between constipation and diarrhea. She was dry heaving this morning and started with left lower quadrant abdominal pain roughly 1 hour prior to arrival which prompted her ER visit today. She complains of 4/10 nonradiating pain in her left lower quadrant. She has not taken anything for pain but took Zofran this morning for nausea. Her grandma passed away roughly 2 months ago who also had IBS problems and states she is here for reassurance and thinks some of her symptoms are secondary to paranoia. She also complains of a mild headache similar to prior. She denies chance of pregnancy. Patient denies fever, chills, dizziness, weakness, visual changes, chest pain, shortness of breath, vomiting, dysuria, or other urinary symptoms.       History provided by:  Patient   used: No    Abdominal Pain  Associated symptoms: diarrhea and nausea    Associated symptoms: no chest pain, no chills, no constipation, no cough, no dysuria, no fatigue, no fever, no hematuria, no shortness of breath, no sore throat and no vomiting        Prior to Admission Medications   Prescriptions Last Dose Informant Patient Reported? Taking?   ALPRAZolam (XANAX) 0.5 mg tablet   No Yes   Sig: Take 1 tablet (0.5 mg total) by mouth 3 (three) times a day as needed for anxiety   Botox 200 units SOLR   Yes Yes   Cholecalciferol (Vitamin D3) 50 MCG (2000 UT)  TABS   Yes No   Cholecalciferol (Vitamin D3) 50 MCG (2000 UT) capsule   No Yes   Sig: Take 1 capsule (2,000 Units total) by mouth daily   Cyanocobalamin (Vitamin B-12) 500 MCG SUBL   No Yes   Sig: Place 1 tablet (500 mcg total) under the tongue in the morning   FeroSul 325 (65 Fe) MG tablet   No Yes   Sig: Take 1 tablet (325 mg total) by mouth in the morning   Galcanezumab-gnlm (Emgality) 120 MG/ML SOAJ   No No   Sig: One ml subcutaneous on the right thigh and 1 ml subcutaneous on the left thigh at the same time for 1 dose   OLANZapine (ZyPREXA) 5 mg tablet   No Yes   Sig: Take 1 tablet (5 mg total) by mouth daily at bedtime Do not take with reglan.   Trudhesa 0.725 MG/ACT AERS   No Yes   Si spray in each nostril for total dose of 1.45mg, may repeat 1 dose after 1 hour. Max 2 doses per 24 hours and 3 doses per week.   ascorbic acid (VITAMIN C) 500 MG tablet   Yes Yes   Sig: Take 500 mg by mouth daily   buPROPion (WELLBUTRIN XL) 300 mg 24 hr tablet   No Yes   Sig: Take 1 tablet (300 mg total) by mouth daily   celecoxib (CeleBREX) 200 mg capsule Not Taking  No No   Sig: Take 1 capsule (200 mg total) by mouth 2 (two) times a day   Patient not taking: Reported on 2024   cyanocobalamin 1,000 mcg/mL   No Yes   Si IM injection every month   escitalopram (LEXAPRO) 20 mg tablet   No Yes   Sig: Take 1 tablet (20 mg total) by mouth daily   esomeprazole (NexIUM) 40 MG capsule   Yes Yes   Sig: Take 40 mg by mouth   etonogestrel-ethinyl estradiol (NuvaRing) 0.12-0.015 MG/24HR vaginal ring   No Yes   Sig: Insert ring for 21 days then remove for one week.   gabapentin (Neurontin) 300 mg capsule   No Yes   Sig: Take 1 capsule (300 mg total) by mouth 3 (three) times a day   indomethacin (INDOCIN) 25 mg capsule   No No   Si-2 tabs BID prn severe headache with food. Hold toradol.   loperamide (IMODIUM) 2 mg capsule   No Yes   Sig: Take 1 capsule (2 mg total) by mouth 4 (four) times a day as needed for diarrhea    magnesium Oxide (MAG-OX) 400 mg TABS   No Yes   Sig: Take 1 tablet (400 mg total) by mouth daily   methocarbamol (ROBAXIN) 500 mg tablet   No Yes   Sig: Take 1 tablet (500 mg total) by mouth 3 (three) times a day   mupirocin (BACTROBAN) 2 % ointment Not Taking  No No   Sig: Apply topically 2 (two) times a day   Patient not taking: Reported on 7/7/2024   nystatin-triamcinolone (MYCOLOG-II) cream Not Taking  No No   Sig: Apply topically 2 (two) times a day   Patient not taking: Reported on 7/7/2024   onabotulinumtoxin A (BOTOX) 100 units  Self Yes No   Sig: Inject as directed   ondansetron (ZOFRAN) 4 mg tablet 7/7/2024  No Yes   Sig: Take 1 tablet (4 mg total) by mouth every 6 (six) hours   phentermine (ADIPEX-P) 37.5 MG tablet   No Yes   Sig: Take 1 tablet (37.5 mg total) by mouth in the morning   prazosin (MINIPRESS) 2 mg capsule   No Yes   Sig: Take 1 capsule (2 mg total) by mouth daily at bedtime      Facility-Administered Medications Last Administration Doses Remaining   cyanocobalamin injection 1,000 mcg 3/14/2024  2:50 PM           Past Medical History:   Diagnosis Date    Abnormal Pap smear of cervix     Allergic     Anxiety     Arthritis     Dental caries     Depression     Fibromyalgia, primary     GERD (gastroesophageal reflux disease)     Hypertension     IBS (irritable bowel syndrome)     Migraine     Obesity     Vitamin B12 deficiency        Past Surgical History:   Procedure Laterality Date    COLONOSCOPY N/A 5/8/2018    Procedure: COLONOSCOPY;  Surgeon: Stephanie Alston MD;  Location: [facility];  Service: Gastroenterology    VT EXC B9 LESION MRGN XCP SK TG S/N/H/F/G > 4.0CM N/A 7/1/2021    Procedure: EXCISION OF PILAR SCALP CYST X 2 AND RIGHT INNER GROIN NEVVUS;  Surgeon: Denis Barron MD;  Location:  MAIN OR;  Service: Plastics    VT REPAIR COMPLEX SCALP/ARM/LEG 2.6-7.5 CM N/A 7/1/2021    Procedure: CLOSURE WOUND SCALP X 2 AND RIGHT INNER GROIN;  Surgeon: Denis Barron MD;  Location:   MAIN OR;  Service: Plastics    TONSILLECTOMY      WISDOM TOOTH EXTRACTION         Family History   Problem Relation Age of Onset    Rheum arthritis Mother     Psoriasis Mother     Other Mother     Hypertension Mother     Diabetes unspecified Mother     Sjogren's syndrome Mother     Alcohol abuse Mother     Drug abuse Mother     Anxiety disorder Father     Alcohol abuse Father     Lung cancer Maternal Grandfather     Cancer Other         bone    Diabetes Other     Other Other         High blood pressure    Depression Maternal Grandmother      I have reviewed and agree with the history as documented.    E-Cigarette/Vaping    E-Cigarette Use Never User     Start Date 7/1/19     Comments medical marijuana      E-Cigarette/Vaping Substances    Nicotine No     THC No     CBD No     Flavoring No     Other No     Unknown No      Social History     Tobacco Use    Smoking status: Never     Passive exposure: Past    Smokeless tobacco: Never   Vaping Use    Vaping status: Never Used   Substance Use Topics    Alcohol use: Not Currently     Comment: rarely    Drug use: Yes     Frequency: 2.0 times per week     Types: Marijuana     Comment: medical marijuana (vape)       Review of Systems   Constitutional:  Negative for chills, fatigue and fever.   HENT:  Positive for congestion. Negative for ear pain, rhinorrhea and sore throat.    Eyes:  Negative for pain and visual disturbance.   Respiratory:  Negative for cough and shortness of breath.    Cardiovascular:  Negative for chest pain and palpitations.   Gastrointestinal:  Positive for abdominal pain, diarrhea and nausea. Negative for constipation and vomiting.   Genitourinary:  Negative for dysuria, frequency, hematuria and urgency.   Musculoskeletal:  Negative for arthralgias and back pain.   Skin:  Negative for color change and rash.   Neurological:  Positive for headaches. Negative for dizziness, seizures, syncope, weakness and light-headedness.   Psychiatric/Behavioral:   Negative for agitation and confusion. The patient is nervous/anxious.        Physical Exam  Physical Exam  Vitals and nursing note reviewed.   Constitutional:       General: She is not in acute distress.     Appearance: She is well-developed. She is not ill-appearing.   HENT:      Head: Normocephalic and atraumatic.      Nose: No congestion.   Eyes:      Conjunctiva/sclera: Conjunctivae normal.   Cardiovascular:      Rate and Rhythm: Normal rate and regular rhythm.      Heart sounds: No murmur heard.  Pulmonary:      Effort: Pulmonary effort is normal. No respiratory distress.      Breath sounds: Normal breath sounds. No wheezing, rhonchi or rales.   Abdominal:      Palpations: Abdomen is soft.      Tenderness: There is abdominal tenderness in the left lower quadrant. There is no right CVA tenderness, left CVA tenderness or guarding. Negative signs include Segura's sign and McBurney's sign.      Hernia: No hernia is present.   Musculoskeletal:         General: No swelling.      Cervical back: Neck supple.   Skin:     General: Skin is warm and dry.      Capillary Refill: Capillary refill takes less than 2 seconds.   Neurological:      General: No focal deficit present.      Mental Status: She is alert and oriented to person, place, and time.   Psychiatric:         Mood and Affect: Mood normal.         Vital Signs  ED Triage Vitals [07/07/24 1703]   Temperature Pulse Respirations Blood Pressure SpO2   98.7 °F (37.1 °C) 93 18 158/88 97 %      Temp Source Heart Rate Source Patient Position - Orthostatic VS BP Location FiO2 (%)   Oral Monitor -- -- --      Pain Score       4           Vitals:    07/07/24 1703   BP: 158/88   Pulse: 93         Visual Acuity      ED Medications  Medications   iohexol (OMNIPAQUE) 350 MG/ML injection (MULTI-DOSE) 100 mL (100 mL Intravenous Not Given 7/7/24 1849)   ondansetron (ZOFRAN) injection 4 mg (4 mg Intravenous Given 7/7/24 1809)   sodium chloride 0.9 % bolus 1,000 mL (0 mL Intravenous  Stopped 7/7/24 1902)   ketorolac (TORADOL) injection 15 mg (15 mg Intravenous Given 7/7/24 1810)       Diagnostic Studies  Results Reviewed       Procedure Component Value Units Date/Time    CMP [[id number]] Collected: 07/07/24 1808    Lab Status: Final result Specimen: Blood from Arm, Left Updated: 07/07/24 1838     Sodium 136 mmol/L      Potassium 3.7 mmol/L      Chloride 104 mmol/L      CO2 22 mmol/L      ANION GAP 10 mmol/L      BUN 5 mg/dL      Creatinine 0.64 mg/dL      Glucose 88 mg/dL      Calcium 9.1 mg/dL      AST 23 U/L      ALT 18 U/L      Alkaline Phosphatase 59 U/L      Total Protein 7.1 g/dL      Albumin 3.7 g/dL      Total Bilirubin 0.46 mg/dL      eGFR 117 ml/min/1.73sq m     Narrative:      National Kidney Disease Foundation guidelines for Chronic Kidney Disease (CKD):     Stage 1 with normal or high GFR (GFR > 90 mL/min/1.73 square meters)    Stage 2 Mild CKD (GFR = 60-89 mL/min/1.73 square meters)    Stage 3A Moderate CKD (GFR = 45-59 mL/min/1.73 square meters)    Stage 3B Moderate CKD (GFR = 30-44 mL/min/1.73 square meters)    Stage 4 Severe CKD (GFR = 15-29 mL/min/1.73 square meters)    Stage 5 End Stage CKD (GFR <15 mL/min/1.73 square meters)  Note: GFR calculation is accurate only with a steady state creatinine    Lipase [[id number]]  (Normal) Collected: 07/07/24 1808    Lab Status: Final result Specimen: Blood from Arm, Left Updated: 07/07/24 1838     Lipase 16 u/L     UA w Reflex to Microscopic w Reflex to Culture [[id number]]  (Abnormal) Collected: 07/07/24 1808    Lab Status: Final result Specimen: Urine, Clean Catch Updated: 07/07/24 1831     Color, UA Yellow     Clarity, UA Clear     Specific Gravity, UA 1.025     pH, UA 6.5     Leukocytes, UA Negative     Nitrite, UA Negative     Protein, UA Negative mg/dl      Glucose, UA Negative mg/dl      Ketones, UA Trace mg/dl      Urobilinogen, UA 0.2 E.U./dl      Bilirubin, UA Negative     Occult Blood, UA Negative    CBC and differential  [[id number]]  (Abnormal) Collected: 07/07/24 1808    Lab Status: Final result Specimen: Blood from Arm, Left Updated: 07/07/24 1820     WBC 7.49 Thousand/uL      RBC 4.28 Million/uL      Hemoglobin 12.3 g/dL      Hematocrit 37.7 %      MCV 88 fL      MCH 28.7 pg      MCHC 32.6 g/dL      RDW 12.8 %      MPV 8.9 fL      Platelets 450 Thousands/uL      nRBC 0 /100 WBCs      Segmented % 51 %      Immature Grans % 0 %      Lymphocytes % 38 %      Monocytes % 8 %      Eosinophils Relative 3 %      Basophils Relative 0 %      Absolute Neutrophils 3.83 Thousands/µL      Absolute Immature Grans 0.01 Thousand/uL      Absolute Lymphocytes 2.85 Thousands/µL      Absolute Monocytes 0.58 Thousand/µL      Eosinophils Absolute 0.19 Thousand/µL      Basophils Absolute 0.03 Thousands/µL     POCT pregnancy, urine [[id number]]  (Normal) Resulted: 07/07/24 1812    Lab Status: Final result Specimen: Urine Updated: 07/07/24 1812     EXT Preg Test, Ur Negative     Control Valid                   No orders to display              Procedures  Procedures         ED Course  ED Course as of 07/07/24 1912   Sun Jul 07, 2024   1715 Blood Pressure: 158/88  Mildly elevated BP. Other vitals WNL.   1830 CBC and differential(!)  No leukocytosis or anemia. Platelets at baseline.   1831 PREGNANCY TEST URINE: Negative   1846 CMP  Electrolytes WNL. No ALISON or glucose abnormality. No transaminitis.   1847 UA unremarkable.    1847 LIPASE: 16  Lipase WNL.   1851 Went over results with patient and mother. She is happy to hear her results and no longer has nausea and her abdominal pain/tenderness is improved. Headache is gone. She would like to go home and prefers to defer the CT imaging. Will discontinue CT imaging and discharge home with strict return precautions.                                             Medical Decision Making  Patient is a non-ill-appearing 34-year-old female with relevant past medical history of IBS and UTIs presenting with left lower  quadrant pain, nausea, and diarrhea x 2 days. Mild tenderness over left lower quadrant. No urinary symptoms. Multiple recent life stressors.  Brief focused differential diagnosis including but not limited to: UTI, gastroenteritis, diverticulitis, colitis, IBS, chronic diarrhea  Abdominal labs including CBC, CMP, and lipase and CT abdomen/pelvis with IV contrast due to abdominal pain, nausea, and mild diarrhea along with LLQ tenderness. UA to rule out UTI as she has a history of this. Urine pregnancy to rule out pregnancy.  See ED course for interpretation of labs, imaging, and further medical decision making.   Toradol for her abdominal pain and headache. Zofran for her nausea. IV fluids for hydration. She was feeling better after these medications and the hydration and wanted to go home. She deferred the CT imaging at this time so this was discontinued. No evidence of infection based on history, physical examination, or labs. Advised her to continue with fluids and BRAT diet at home. She has Zofran at home.  Dispo: Patient discharged home with strict return precautions. Provided verbal and written supportive care instructions for managing her illness. Advised patient to return to the nearest emergency room if she has new or worsening symptoms or if any questions arise. Advised patient to follow-up with her family doctor and gastroenterologist. Patient is satisfied with care and agrees with management and plan.     Amount and/or Complexity of Data Reviewed  External Data Reviewed: labs, radiology and notes.  Labs: ordered. Decision-making details documented in ED Course.  Radiology: ordered. Decision-making details documented in ED Course.    Risk  Prescription drug management.             Disposition  Final diagnoses:   Abdominal pain   Nausea   Diarrhea     Time reflects when diagnosis was documented in both MDM as applicable and the Disposition within this note       Time User Action Codes Description Comment     7/7/2024  6:59 PM Jethro Arnold Add [R10.9] Abdominal pain     7/7/2024  6:59 PM Jethro Arnold Add [R11.0] Nausea     7/7/2024  7:01 PM Jethro Arnold Add [R19.7] Diarrhea           ED Disposition       ED Disposition   Discharge    Condition   Stable    Date/Time   Sun Jul 7, 2024  6:53 PM    Comment   Cruz Schneider discharge to home/self care.                   Follow-up Information       Follow up With Specialties Details Why Contact Info Additional Information    Oni Beckham DO Family Medicine Schedule an appointment as soon as possible for a visit   [address] [place] [id number]  [phone]       [facility] Emergency Department Emergency Medicine Go to  If symptoms worsen [address] [facility] [id number] [phone] [facility] Emergency Department, [address] [address]            Patient's Medications   Discharge Prescriptions    No medications on file       No discharge procedures on file.    PDMP Review         Value Time User    PDMP Reviewed  Yes 5/13/2024 10:43 AM Aliyah Keating MD            ED Provider  Electronically Signed by             Jethro Arnold PA-C  07/07/24 1911       Jethro Arnold PA-C  07/07/24 1912

## 2024-07-07 NOTE — ED ATTENDING ATTESTATION
7/7/2024  I, Chadd Sims MD, have discussed the patient with the resident/non-physician practitioner and agree with the resident's/non-physician practitioner's findings, Plan of Care, and MDM as documented in the resident's/non-physician practitioner's note, except where noted. All available labs and Radiology studies were reviewed.  I was present for key portions of any procedure(s) performed by the resident/non-physician practitioner and I was immediately available to provide assistance.       At this point I agree with the current assessment done in the Emergency Department.

## 2024-07-07 NOTE — DISCHARGE INSTRUCTIONS
Immediately return to the emergency room if you experience any new or worsening symptoms or if the symptoms are lasting longer than expected.     Please follow-up with your family doctor or gastroenterologist this week to ensure your symptoms are improving. Ease into a BRAT diet at home and continue with fluids. Continue with the Zofran as needed for nausea.

## 2024-07-08 ENCOUNTER — EVALUATION (OUTPATIENT)
Dept: PHYSICAL THERAPY | Age: 34
End: 2024-07-08
Payer: COMMERCIAL

## 2024-07-08 DIAGNOSIS — M54.50 CHRONIC LOW BACK PAIN, UNSPECIFIED BACK PAIN LATERALITY, UNSPECIFIED WHETHER SCIATICA PRESENT: Primary | ICD-10-CM

## 2024-07-08 DIAGNOSIS — G89.29 CHRONIC LOW BACK PAIN, UNSPECIFIED BACK PAIN LATERALITY, UNSPECIFIED WHETHER SCIATICA PRESENT: Primary | ICD-10-CM

## 2024-07-08 DIAGNOSIS — M54.50 LUMBAR SPINE PAIN: ICD-10-CM

## 2024-07-08 PROCEDURE — 97110 THERAPEUTIC EXERCISES: CPT | Performed by: PHYSICAL THERAPIST

## 2024-07-08 PROCEDURE — 97162 PT EVAL MOD COMPLEX 30 MIN: CPT | Performed by: PHYSICAL THERAPIST

## 2024-07-08 NOTE — PROGRESS NOTES
PT Evaluation     Today's date: 2024  Patient name: Cruz Schneider  : 1990  MRN: [id number]  Referring provider: Oni Beckham DO  Dx:   Encounter Diagnosis     ICD-10-CM    1. Chronic low back pain, unspecified back pain laterality, unspecified whether sciatica present  M54.50 Ambulatory Referral to Physical Therapy    G89.29       2. Lumbar spine pain  M54.50                      Assessment  Impairments: abnormal gait, abnormal or restricted ROM, abnormal movement, activity intolerance, impaired physical strength, lacks appropriate home exercise program and pain with function    Assessment details: PT IE: 24.  Patient noted she is having FM type pain aggravation in her lumbar spine and left hip pain aggravation.  Patient noted current symptoms: weakness in bilateral upper and lower extremities, core and postural musculature.  Patient noted lumbar spine and left hip pain, left calcaneal region hot burning pain and rib cage region pain.  Patient noted she feels a cramp type pain in her side due to the rib pain.  Patient noted the following deficits due to multiple region pain:  unable to resume fitness activities of hula hopping for weight control, walking, chair transfers, stair climbing, bending, lifting, carrying, squatting, prolonged standing to cook and wash dishes.  Patient noted exercise his helpful in pain reduction, but she noted pool based exercises are effective in pain reduction.  Patient noted pain at least is a 3 of 10 and an 8 of 10 at its worst.  Patient denies any new diagnostic testing.  Patient was getting infusions for Ankylosis Spondylosis, but got sick on the medication.    Understanding of Dx/Px/POC: excellent     Prognosis: good  Prognosis details: Patient is a 34 y.o. year old female seen for outpatient PT evaluation with pain, mobility and functional deficits due to FM, chronic low back pain and left lumbar radiculopathy. Patient presents to PT IE with the following  problems, concerns, deficits and impairments: lumbar, thoracic and cervical spine pain, decreased lumbar and cervical spine range of motion, decreased bilateral upper and lower extremity mobility and strength, decrease in postural awareness, + TTP, + muscle guarding, functional limitations and decreased tolerance to activity.  Patient would benefit from skilled PT services under the following PT treatment plan to address the above noted deficits: therapeutic exercises and activities to facilitate lumbar and cervical spine mobility and bilateral upper and lower extremity strength, abdominal strengthening and DLS activities, postural reeducation and strengthening, modalities, manual therapy techniques, IASTM techniques, Kinesio taping techniques and a hep.  Thank you for the referral.     Goals  Short Term goals 4 - 6 weeks  1.  Patient will be independent HEP.   2.  Patient will report a 25 - 50% decrease in pain complaints.  3.  Increase strength 1/2 grade.  4.  Increase ROM 5-10 degrees.    Long Term goals 8 - 12 weeks  1.  Patient will report elimination of pain complaints.  2.  Patient will return to all recreational activities without restriction.  3.  ROM WFL.  4.  Strength 5/5.  5.  Patient will report walking improved by > 10 minutes prior to sitting to rest due to cervical, thoracic, lumbar, bilateral upper and lower extremity symptoms or limitations.  6.  Patient will report ability to perform two flights of steps without cervical, thoracic, lumbar, bilateral upper and lower extremity symptoms or limitations.  7.  Patient will report house hold chores involving static standing improved by > 10 minutes prior to cervical, thoracic, lumbar, bilateral upper and lower extremity symptoms or limitations.  8.  Patient will report ability to bend and lift during house hold activities without cervical, thoracic, lumbar, bilateral upper and lower extremity symptoms or limitations.  9.  Patient will report ability to  reach during self and house hold iadls without cervical, thoracic, lumbar, bilateral upper and lower extremity symptoms or limitations.  10.  Patient will report ability to squat and carrying during house hold chores and activities without cervical, thoracic, lumbar, bilateral upper and lower extremity symptoms or limitations.  11.  Patient will report ability to resume fitness activities of hula hopping without cervical, thoracic, lumbar, bilateral upper and lower extremity symptoms or limitations.  12.  Patient will report ability to perform static standing activities like cooking and cleaning without cervical, thoracic, lumbar, bilateral upper and lower extremity symptoms or limitations.    Plan  Patient would benefit from: skilled physical therapy and PT eval  Planned modality interventions: cryotherapy, TENS, thermotherapy: hydrocollator packs, traction, ultrasound and unattended electrical stimulation    Planned therapy interventions: joint mobilization, manual therapy, massage, aquatic therapy, balance, balance/weight bearing training, body mechanics training, muscle pump exercises, neuromuscular re-education, patient education, postural training, self care, compression, strengthening, stretching, therapeutic activities, therapeutic exercise, therapeutic training, flexibility, functional ROM exercises, gait training, graded activity, graded exercise, graded motor and home exercise program    Frequency: 2x week  Duration in weeks: 8  Treatment plan discussed with: patient      Subjective Evaluation    History of Present Illness  Mechanism of injury: Patient's PMHx is remarkable for AS, Migraine, HTN, GERD, IBS, Myofascial Muscle Pain, Restless leg syndrome, Vitamin B 12 and D Deficiency.  Patient Goals  Patient goals for therapy: decreased pain, increased motion, increased strength, independence with ADLs/IADLs and return to sport/leisure activities  Patient goal: Patient's goal is to resume her fitness  activities and be more mobile.  Pain  At best pain ratin  At worst pain ratin  Location: lumbar, thoracic and cervical spine        Objective     Tenderness     Additional Tenderness Details  Patient is + moderate TTP and severe muscle guarding at thoracic and cervical spine paraspinal musculature and bilateral upper trapezius muscle region.  Patient exhibits protracted cervical spine at minimal to moderate levels.  Patient is + moderate TTP and muscle guarding at lumbar spine erector spinae musculature.    Active Range of Motion   Cervical/Thoracic Spine       Cervical    Flexion: 20 degrees   Extension: 32 degrees      Left lateral flexion: 15 degrees      Right lateral flexion: 18 degrees      Left rotation: 50 degrees  Right rotation: 42 degrees     Left Shoulder   Flexion: 158 degrees   Abduction: 158 degrees     Right Shoulder   Flexion: 160 degrees   Abduction: 260 degrees     Lumbar   Flexion: 86 degrees  with pain  Extension: 36 degrees  with pain  Left lateral flexion: 18 degrees    with pain  Right lateral flexion: 16 degrees  with pain  Left rotation: 55 degrees  with pain  Right rotation: 52 degrees  with pain  Left Hip   Flexion: 80 degrees with pain  Abduction: 15 degrees with pain    Right Hip   Flexion: 82 degrees   Abduction: 18 degrees   Left Knee   Flexion: 94 degrees   Extension: -4 degrees   Extensor la degrees     Right Knee   Flexion: 106 degrees   Extension: -5 degrees   Extensor la degrees   Left Ankle/Foot   Dorsiflexion (ke): 6 degrees   Plantar flexion: 48 degrees     Right Ankle/Foot   Dorsiflexion (ke): 4 degrees   Plantar flexion: 46 degrees     Strength/Myotome Testing     Left Shoulder     Planes of Motion   Flexion: 4-   Abduction: 3+   External rotation at 0°: 4-   Internal rotation at 0°: 4     Right Shoulder     Planes of Motion   Flexion: 3+   Abduction: 3+   External rotation at 0°: 4-   Internal rotation at 0°: 4     Left Hip   Planes of Motion   Flexion:  4-  Extension: 4  Abduction: 4  Adduction: 4    Right Hip   Planes of Motion   Flexion: 4-  Extension: 4-  Abduction: 4-  Adduction: 4    Left Knee   Flexion: 4-  Extension: 4-    Right Knee   Flexion: 4-  Extension: 4-    Left Ankle/Foot   Dorsiflexion: 4+  Plantar flexion: 5    Right Ankle/Foot   Dorsiflexion: 4  Plantar flexion: 5               Access Code: [email or web address]  URL: [email or web address]  Date: 07/08/2024  Prepared by: Benjamin Machuca    Exercises  - Seated Cervical Retraction  - 3-4 x daily - 7 x weekly - 2 sets - 5 reps  - Lying Prone  - 2-3 x daily - 7 x weekly - 1 sets - 1 reps - 2-5 minutes hold  - Prone Press Up  - 2-3 x daily - 7 x weekly - 2 sets - 5 reps      Precautions: Patient's PMHx is remarkable for AS, Migraine, HTN, GERD, IBS, Myofascial Muscle Pain, Restless leg syndrome, Vitamin B 12 and D Deficiency.    Manuals 7/8/24                                                                Neuro Re-Ed                                       HEP: cervical retraction 5 x              HEP lumbar extension  via prone press ups 5 x 2                                                   Ther Ex                          Water walking pre and post             Standing lumbar spine extension against counter             Seated hamstring stretch:B:             LAQ:B:             Hip flexion:B:             Seated postural correction slough over correct             Cervical spine retraction             UT stretch:B:             LS stretch:B:                          Standing scapular squeezes             Standing abdominal contraction with ball push down             Standing slr x 3:B:             Standing hr and tr:B:             Mini squats              SLS and tandem stance:B:             Side stepping and tandem ambulation             Forward step ups:B:                          Paddles rows, horizontal add / abd and shoulder add / abd             Pool pony bicycle                          Pool pony  hang                                                                              Ther Activity                                       Gait Training                                       Modalities

## 2024-07-09 ENCOUNTER — OFFICE VISIT (OUTPATIENT)
Dept: PHYSICAL THERAPY | Age: 34
End: 2024-07-09
Payer: COMMERCIAL

## 2024-07-09 DIAGNOSIS — M54.50 CHRONIC LOW BACK PAIN, UNSPECIFIED BACK PAIN LATERALITY, UNSPECIFIED WHETHER SCIATICA PRESENT: ICD-10-CM

## 2024-07-09 DIAGNOSIS — G89.29 CHRONIC LOW BACK PAIN, UNSPECIFIED BACK PAIN LATERALITY, UNSPECIFIED WHETHER SCIATICA PRESENT: ICD-10-CM

## 2024-07-09 DIAGNOSIS — M54.50 LUMBAR SPINE PAIN: Primary | ICD-10-CM

## 2024-07-09 PROCEDURE — 97113 AQUATIC THERAPY/EXERCISES: CPT

## 2024-07-09 NOTE — PROGRESS NOTES
"Daily Note     Today's date: 2024  Patient name: Cruz Schneider  : 1990  MRN: [id number]  Referring provider: Oni Beckham DO  Dx:   Encounter Diagnosis     ICD-10-CM    1. Lumbar spine pain  M54.50       2. Chronic low back pain, unspecified back pain laterality, unspecified whether sciatica present  M54.50     G89.29                      Subjective: Pt noted 5/10 pain today \"in the ribs\"       Objective: See treatment diary below      Assessment: Pt moved slow due to fatigue and pain, required seated breaks between exercises. No change in symptoms, progress as able.       Plan: Cont with plan of care      Access Code: [email or web address]  URL: [email or web address]  Date: 2024  Prepared by: Benjamin Machuca    Exercises  - Seated Cervical Retraction  - 3-4 x daily - 7 x weekly - 2 sets - 5 reps  - Lying Prone  - 2-3 x daily - 7 x weekly - 1 sets - 1 reps - 2-5 minutes hold  - Prone Press Up  - 2-3 x daily - 7 x weekly - 2 sets - 5 reps      Precautions: Patient's PMHx is remarkable for AS, Migraine, HTN, GERD, IBS, Myofascial Muscle Pain, Restless leg syndrome, Vitamin B 12 and D Deficiency.    Manuals 24                                                               Neuro Re-Ed                                       HEP: cervical retraction 5 x              HEP lumbar extension  via prone press ups 5 x 2                                                   Ther Ex                          Water walking pre and post  5x            Standing lumbar spine extension against counter  20x            Seated hamstring stretch:B:  20sec 5x            LAQ:B:  20x            Hip flexion:B:  20x            Seated postural correction slough over correct  20x            Cervical spine retraction  20sec 5x            UT stretch:B:  20sec 5x            LS stretch:B:  NT                        Standing scapular squeezes  20X 2SEC            Standing abdominal contraction with ball push down  20X          "   Standing slr x 3:B:  20X            Standing hr and tr:B:  20X            Mini squats  20X             SLS and tandem stance:B:  NT           Side stepping and tandem ambulation  6X            Forward step ups:B:  NT                        Paddles rows, horizontal add / abd and shoulder add / abd  NT           Pool pony bicycle  NT                        Pool pony hang  NT                        Q/L st B   20sec 3x                                                     Ther Activity                                       Gait Training                                       Modalities

## 2024-07-10 ENCOUNTER — PATIENT MESSAGE (OUTPATIENT)
Dept: FAMILY MEDICINE CLINIC | Facility: CLINIC | Age: 34
End: 2024-07-10

## 2024-07-10 DIAGNOSIS — K58.0 IRRITABLE BOWEL SYNDROME WITH DIARRHEA: Primary | ICD-10-CM

## 2024-07-10 NOTE — TELEPHONE ENCOUNTER
Please sign pended order. Spoke with patient and advised of script, no further questions or concerns at the time of call Problem: Falls - Risk of  Goal: *Absence of Falls  Document [place] Fall Risk and appropriate interventions in the flowsheet.    Outcome: Progressing Towards Goal  Fall Risk Interventions:  Mobility Interventions: Communicate number of staff needed for ambulation/transfer, Patient to call before getting OOB           Medication Interventions: Patient to call before getting OOB     Elimination Interventions: Call light in reach     History of Falls Interventions: Room close to nurse's station

## 2024-07-11 RX ORDER — DICYCLOMINE HYDROCHLORIDE 10 MG/1
10 CAPSULE ORAL
Qty: 120 CAPSULE | Refills: 1 | Status: SHIPPED | OUTPATIENT
Start: 2024-07-11

## 2024-07-12 ENCOUNTER — TELEMEDICINE (OUTPATIENT)
Dept: BEHAVIORAL/MENTAL HEALTH CLINIC | Facility: CLINIC | Age: 34
End: 2024-07-12
Payer: COMMERCIAL

## 2024-07-12 ENCOUNTER — TELEPHONE (OUTPATIENT)
Dept: PSYCHIATRY | Facility: CLINIC | Age: 34
End: 2024-07-12

## 2024-07-12 DIAGNOSIS — F41.1 GENERALIZED ANXIETY DISORDER: Chronic | ICD-10-CM

## 2024-07-12 DIAGNOSIS — F33.1 DEPRESSION, MAJOR, RECURRENT, MODERATE (HCC): Primary | Chronic | ICD-10-CM

## 2024-07-12 DIAGNOSIS — F43.12 CHRONIC POST-TRAUMATIC STRESS DISORDER (PTSD): ICD-10-CM

## 2024-07-12 PROCEDURE — 90837 PSYTX W PT 60 MINUTES: CPT | Performed by: SOCIAL WORKER

## 2024-07-12 RX ORDER — PRAZOSIN HYDROCHLORIDE 2 MG/1
2 CAPSULE ORAL
Qty: 90 CAPSULE | Refills: 0 | Status: SHIPPED | OUTPATIENT
Start: 2024-07-12

## 2024-07-12 NOTE — PSYCH
Virtual Regular Visit    Verification of patient location:    Patient is located at {[facility] Virtual Patient Location:[address]} in the following state in which I hold an active license {[facility] virtual patient location:[address]}      Assessment/Plan:    Problem List Items Addressed This Visit        Behavioral Health    Generalized anxiety disorder (Chronic)    Depression, major, recurrent, moderate (HCC) - Primary (Chronic)       Goals addressed in session: {GOALS:10912}          Reason for visit is   Chief Complaint   Patient presents with   • Virtual Regular Visit          Encounter provider APARNA Fox      Recent Visits  No visits were found meeting these conditions.  Showing recent visits within past 7 days and meeting all other requirements  Today's Visits  Date Type Provider Dept   07/12/24 Telemedicine APARNA Fox [facility] Therapist [place]   Showing today's visits and meeting all other requirements  Future Appointments  No visits were found meeting these conditions.  Showing future appointments within next 150 days and meeting all other requirements       The patient was identified by name and date of birth. Cruz Schneider was informed that this is a telemedicine visit and that the visit is being conducted through{[facility] VIRTUAL VISIT MEDIUM:45218}.  {Telemedicine confidentiality :50737} {Telemedicine participants:94941}  She acknowledged consent and understanding of privacy and security of the video platform. The patient has agreed to participate and understands they can discontinue the visit at any time.    Patient is aware this is a billable service.     Subjective  Cruz Schneider is a 34 y.o. female *** .      HPI     Past Medical History:   Diagnosis Date   • Abnormal Pap smear of cervix    • Allergic    • Anxiety    • Arthritis    • Dental caries    • Depression    • Fibromyalgia, primary    • GERD (gastroesophageal reflux disease)    • Hypertension    • IBS (irritable bowel  syndrome)    • Migraine    • Obesity    • Vitamin B12 deficiency        Past Surgical History:   Procedure Laterality Date   • COLONOSCOPY N/A 5/8/2018    Procedure: COLONOSCOPY;  Surgeon: Stephanie Alston MD;  Location: [facility] GI LAB;  Service: Gastroenterology   • NY EXC B9 LESION MRGN XCP SK TG S/N/H/F/G > 4.0CM N/A 7/1/2021    Procedure: EXCISION OF PILAR SCALP CYST X 2 AND RIGHT INNER GROIN NEVVUS;  Surgeon: Denis Barron MD;  Location:  MAIN OR;  Service: Plastics   • NY REPAIR COMPLEX SCALP/ARM/LEG 2.6-7.5 CM N/A 7/1/2021    Procedure: CLOSURE WOUND SCALP X 2 AND RIGHT INNER GROIN;  Surgeon: Denis Barron MD;  Location:  MAIN OR;  Service: Plastics   • TONSILLECTOMY     • WISDOM TOOTH EXTRACTION         Current Outpatient Medications   Medication Sig Dispense Refill   • ALPRAZolam (XANAX) 0.5 mg tablet Take 1 tablet (0.5 mg total) by mouth 3 (three) times a day as needed for anxiety 90 tablet 2   • ascorbic acid (VITAMIN C) 500 MG tablet Take 500 mg by mouth daily     • Botox 200 units SOLR      • buPROPion (WELLBUTRIN XL) 300 mg 24 hr tablet Take 1 tablet (300 mg total) by mouth daily 90 tablet 0   • celecoxib (CeleBREX) 200 mg capsule Take 1 capsule (200 mg total) by mouth 2 (two) times a day (Patient not taking: Reported on 7/7/2024) 60 capsule 3   • Cholecalciferol (Vitamin D3) 50 MCG (2000 UT) capsule Take 1 capsule (2,000 Units total) by mouth daily 90 capsule 1   • Cholecalciferol (Vitamin D3) 50 MCG (2000 UT) TABS      • Cyanocobalamin (Vitamin B-12) 500 MCG SUBL Place 1 tablet (500 mcg total) under the tongue in the morning 30 tablet 2   • cyanocobalamin 1,000 mcg/mL 1 IM injection every month 3 mL 3   • dicyclomine (BENTYL) 10 mg capsule Take 1 capsule (10 mg total) by mouth 4 (four) times a day (before meals and at bedtime) 120 capsule 1   • escitalopram (LEXAPRO) 20 mg tablet Take 1 tablet (20 mg total) by mouth daily 90 tablet 0   • esomeprazole (NexIUM) 40 MG capsule Take 40 mg by mouth      • etonogestrel-ethinyl estradiol (NuvaRing) 0.12-0.015 MG/24HR vaginal ring Insert ring for 21 days then remove for one week. 3 each 4   • FeroSul 325 (65 Fe) MG tablet Take 1 tablet (325 mg total) by mouth in the morning 90 tablet 1   • gabapentin (Neurontin) 300 mg capsule Take 1 capsule (300 mg total) by mouth 3 (three) times a day 90 capsule 5   • Galcanezumab-gnlm (Emgality) 120 MG/ML SOAJ One ml subcutaneous on the right thigh and 1 ml subcutaneous on the left thigh at the same time for 1 dose 2 mL 0   • indomethacin (INDOCIN) 25 mg capsule 1-2 tabs BID prn severe headache with food. Hold toradol. 30 capsule 6   • loperamide (IMODIUM) 2 mg capsule Take 1 capsule (2 mg total) by mouth 4 (four) times a day as needed for diarrhea 12 capsule 0   • magnesium Oxide (MAG-OX) 400 mg TABS Take 1 tablet (400 mg total) by mouth daily 30 tablet 5   • methocarbamol (ROBAXIN) 500 mg tablet Take 1 tablet (500 mg total) by mouth 3 (three) times a day 270 tablet 1   • mupirocin (BACTROBAN) 2 % ointment Apply topically 2 (two) times a day (Patient not taking: Reported on 7/7/2024) 15 g 0   • nystatin-triamcinolone (MYCOLOG-II) cream Apply topically 2 (two) times a day (Patient not taking: Reported on 7/7/2024) 30 g 1   • OLANZapine (ZyPREXA) 5 mg tablet Take 1 tablet (5 mg total) by mouth daily at bedtime Do not take with reglan. 30 tablet 2   • onabotulinumtoxin A (BOTOX) 100 units Inject as directed     • ondansetron (ZOFRAN) 4 mg tablet Take 1 tablet (4 mg total) by mouth every 6 (six) hours 30 tablet 2   • phentermine (ADIPEX-P) 37.5 MG tablet Take 1 tablet (37.5 mg total) by mouth in the morning 30 tablet 3   • prazosin (MINIPRESS) 2 mg capsule Take 1 capsule (2 mg total) by mouth daily at bedtime 90 capsule 0   • Trudhesa 0.725 MG/ACT AERS 1 spray in each nostril for total dose of 1.45mg, may repeat 1 dose after 1 hour. Max 2 doses per 24 hours and 3 doses per week. 12 mL 6     Current Facility-Administered Medications    Medication Dose Route Frequency Provider Last Rate Last Admin   • cyanocobalamin injection 1,000 mcg  1,000 mcg Intramuscular Q30 Days Oni Beckham DO   1,000 mcg at 03/14/24 1450        Allergies   Allergen Reactions   • Cimzia [Certolizumab Pegol] Swelling   • Desvenlafaxine      Other reaction(s): state of confusion   • Ixekizumab Vomiting   • Seasonal Ic [Octacosanol] Nasal Congestion   • Voltaren [Diclofenac] Swelling   • Tizanidine Anxiety       Review of Systems    Video Exam    There were no vitals filed for this visit.    Physical Exam     Visit Time    Visit Start Time: ***  Visit Stop Time: ***  Total Visit Duration: {Psych Total Visit Time:15417}

## 2024-07-12 NOTE — TELEPHONE ENCOUNTER
Medication Refill Request     Name of Medication MINIPRESS  Dose/Frequency 2mg/take 1 capsule at bedtine  Quantity 90  Verified pharmacy   [x]  [facility]  Verified ordering Provider   [x]  Does patient have enough for the next 3 days? Yes [] No [x]  Does patient have a follow-up appointment scheduled? Yes [x] No []   If so when is appointment: 8/14 @ 10:30

## 2024-07-12 NOTE — TELEPHONE ENCOUNTER
----- Message from Shanna RAMIREZ sent at 7/12/2024  9:51 AM EDT -----  Regarding: [facility] [place]  Hello! Can you please find out if there are scholarships available for individuals with disabilities at the [facility] [place]? This individual lives in [place]. She is not yet approved for SSDI, so she does not have any income at this point in time. She lives with her mother-- who provides for her needs. She is not able to afford a membership, but she would greatly benefit from a membership at the [facility] (as long as there is a swimming pool available-- that is most important, as she needs to do aquatherapy). Thanks. Let me know. Torey

## 2024-07-12 NOTE — PSYCH
Virtual Regular Visit    Verification of patient location:    Patient is located at Home in the following state in which I hold an active license [place]    Assessment/Plan:    Problem List Items Addressed This Visit          Behavioral Health    Generalized anxiety disorder (Chronic)    Depression, major, recurrent, moderate (HCC) - Primary (Chronic)     Goals addressed in session: Goal 1      Reason for visit is   Chief Complaint   Patient presents with    Virtual Regular Visit     Encounter provider APARNA Fox    Recent Visits  No visits were found meeting these conditions.  Showing recent visits within past 7 days and meeting all other requirements  Today's Visits  Date Type Provider Dept   07/12/24 Telemedicine APARNA Fox [facility] Therapist [place]   Showing today's visits and meeting all other requirements  Future Appointments  No visits were found meeting these conditions.  Showing future appointments within next 150 days and meeting all other requirements     The patient was identified by name and date of birth. Cruz Schneider was informed that this is a telemedicine visit and that the visit is being conducted throughthe Epic Embedded platform. She agrees to proceed..  My office door was closed. No one else was in the room.  She acknowledged consent and understanding of privacy and security of the video platform. The patient has agreed to participate and understands they can discontinue the visit at any time.    Patient is aware this is a billable service.     Subjective  Cruz Schneider is a 34 y.o. female.    HPI     Past Medical History:   Diagnosis Date    Abnormal Pap smear of cervix     Allergic     Anxiety     Arthritis     Dental caries     Depression     Fibromyalgia, primary     GERD (gastroesophageal reflux disease)     Hypertension     IBS (irritable bowel syndrome)     Migraine     Obesity     Vitamin B12 deficiency        Past Surgical History:   Procedure  Laterality Date    COLONOSCOPY N/A 5/8/2018    Procedure: COLONOSCOPY;  Surgeon: Stephanie Alston MD;  Location: [facility] GI LAB;  Service: Gastroenterology    TN EXC B9 LESION MRGN XCP SK TG S/N/H/F/G > 4.0CM N/A 7/1/2021    Procedure: EXCISION OF PILAR SCALP CYST X 2 AND RIGHT INNER GROIN NEVVUS;  Surgeon: Denis Barron MD;  Location:  MAIN OR;  Service: Plastics    TN REPAIR COMPLEX SCALP/ARM/LEG 2.6-7.5 CM N/A 7/1/2021    Procedure: CLOSURE WOUND SCALP X 2 AND RIGHT INNER GROIN;  Surgeon: Denis Barron MD;  Location:  MAIN OR;  Service: Plastics    TONSILLECTOMY      WISDOM TOOTH EXTRACTION         Current Outpatient Medications   Medication Sig Dispense Refill    ALPRAZolam (XANAX) 0.5 mg tablet Take 1 tablet (0.5 mg total) by mouth 3 (three) times a day as needed for anxiety 90 tablet 2    ascorbic acid (VITAMIN C) 500 MG tablet Take 500 mg by mouth daily      Botox 200 units SOLR       buPROPion (WELLBUTRIN XL) 300 mg 24 hr tablet Take 1 tablet (300 mg total) by mouth daily 90 tablet 0    celecoxib (CeleBREX) 200 mg capsule Take 1 capsule (200 mg total) by mouth 2 (two) times a day (Patient not taking: Reported on 7/7/2024) 60 capsule 3    Cholecalciferol (Vitamin D3) 50 MCG (2000 UT) capsule Take 1 capsule (2,000 Units total) by mouth daily 90 capsule 1    Cholecalciferol (Vitamin D3) 50 MCG (2000 UT) TABS       Cyanocobalamin (Vitamin B-12) 500 MCG SUBL Place 1 tablet (500 mcg total) under the tongue in the morning 30 tablet 2    cyanocobalamin 1,000 mcg/mL 1 IM injection every month 3 mL 3    dicyclomine (BENTYL) 10 mg capsule Take 1 capsule (10 mg total) by mouth 4 (four) times a day (before meals and at bedtime) 120 capsule 1    escitalopram (LEXAPRO) 20 mg tablet Take 1 tablet (20 mg total) by mouth daily 90 tablet 0    esomeprazole (NexIUM) 40 MG capsule Take 40 mg by mouth      etonogestrel-ethinyl estradiol (NuvaRing) 0.12-0.015 MG/24HR vaginal ring Insert ring for 21 days then remove for one  week. 3 each 4    FeroSul 325 (65 Fe) MG tablet Take 1 tablet (325 mg total) by mouth in the morning 90 tablet 1    gabapentin (Neurontin) 300 mg capsule Take 1 capsule (300 mg total) by mouth 3 (three) times a day 90 capsule 5    Galcanezumab-gnlm (Emgality) 120 MG/ML SOAJ One ml subcutaneous on the right thigh and 1 ml subcutaneous on the left thigh at the same time for 1 dose 2 mL 0    indomethacin (INDOCIN) 25 mg capsule 1-2 tabs BID prn severe headache with food. Hold toradol. 30 capsule 6    loperamide (IMODIUM) 2 mg capsule Take 1 capsule (2 mg total) by mouth 4 (four) times a day as needed for diarrhea 12 capsule 0    magnesium Oxide (MAG-OX) 400 mg TABS Take 1 tablet (400 mg total) by mouth daily 30 tablet 5    methocarbamol (ROBAXIN) 500 mg tablet Take 1 tablet (500 mg total) by mouth 3 (three) times a day 270 tablet 1    mupirocin (BACTROBAN) 2 % ointment Apply topically 2 (two) times a day (Patient not taking: Reported on 7/7/2024) 15 g 0    nystatin-triamcinolone (MYCOLOG-II) cream Apply topically 2 (two) times a day (Patient not taking: Reported on 7/7/2024) 30 g 1    OLANZapine (ZyPREXA) 5 mg tablet Take 1 tablet (5 mg total) by mouth daily at bedtime Do not take with reglan. 30 tablet 2    onabotulinumtoxin A (BOTOX) 100 units Inject as directed      ondansetron (ZOFRAN) 4 mg tablet Take 1 tablet (4 mg total) by mouth every 6 (six) hours 30 tablet 2    phentermine (ADIPEX-P) 37.5 MG tablet Take 1 tablet (37.5 mg total) by mouth in the morning 30 tablet 3    prazosin (MINIPRESS) 2 mg capsule Take 1 capsule (2 mg total) by mouth daily at bedtime 90 capsule 0    Trudhesa 0.725 MG/ACT AERS 1 spray in each nostril for total dose of 1.45mg, may repeat 1 dose after 1 hour. Max 2 doses per 24 hours and 3 doses per week. 12 mL 6     Current Facility-Administered Medications   Medication Dose Route Frequency Provider Last Rate Last Admin    cyanocobalamin injection 1,000 mcg  1,000 mcg Intramuscular Q30 Days  "Oni Beckham, DO   1,000 mcg at 03/14/24 1450        Allergies   Allergen Reactions    Cimzia [Certolizumab Pegol] Swelling    Desvenlafaxine      Other reaction(s): state of confusion    Ixekizumab Vomiting    Seasonal Ic [Octacosanol] Nasal Congestion    Voltaren [Diclofenac] Swelling    Tizanidine Anxiety       Review of Systems    Video Exam    There were no vitals filed for this visit.    Physical Exam     Behavioral Health Psychotherapy Progress Note    Psychotherapy Provided: Individual Psychotherapy     1. Depression, major, recurrent, moderate (HCC)        2. Generalized anxiety disorder            Goals addressed in session: Goal 1     DATA: Met with Stephanie for scheduled individual session. She discussed her health-related issues and her attempts to maintain positive self-care. Stephanie also discussed her mother's recent diagnosis of cirrhosis of the liver (secondary to her medical issues). She states that she will be attending her mother's medical appointment to learn the next steps and support her mother. Stephanie discussed the progress on her grandmother's house. She states that she is \"not ready to see her house go.\" They will be moving her furniture out this weekend, and it will be ready for someone else to move in. Stephanie discussed her relationship with her mother, and she feels that she \"misses\" her mother, during this transition process. Stephanie states that she has not been able to take her medications consistently-- due to having an IBS flare (diarrhea). She states that the medications increase her abdominal pain. She is trying to take her antidepressants every other day, at the least. She also states that she has not been able to use her medicinal MJ, because it is not tasting good to her. Stephanie discussed her socialization and the events surrounding her birthday. We discussed the connection between her moods and her physical health issues. She has not been able to get outside recently, due to the " "level of heat. She has had a difficult time with temperature regulation. She states that she has started to work on some activities. She started [facility]a-[facility]therapy this past Tuesday. She will be going two times per week, for at least four weeks-- at which time they will review.     During this session, this clinician used the following therapeutic modalities: Client-centered Therapy, Dialectical Behavior Therapy, Mindfulness-based Strategies, Motivational Interviewing, Solution-Focused Therapy, and Supportive Psychotherapy    Substance Abuse was not addressed during this session. If the client is diagnosed with a co-occurring substance use disorder, please indicate any changes in the frequency or amount of use: n/a. Stage of change for addressing substance use diagnoses: No substance use/Not applicable    ASSESSMENT:  Stephanie Schneider presents with a Euthymic/ normal mood.     her affect is Normal range and intensity, which is congruent, with her mood and the content of the session. The client has made progress on their goals.    Stephanie Schneider presents with a minimal risk of suicide, minimal risk of self-harm, and minimal risk of harm to others.    For any risk assessment that surpasses a \"low\" rating, a safety plan must be developed.    A safety plan was indicated: no  If yes, describe in detail n/a    PLAN: Between sessions, Stephanie Schneider will continue to follow up with her medical providers regarding her current flare. She will work on becoming more adherent with her medication regimen. At the next session, the therapist will use Client-centered Therapy, Dialectical Behavior Therapy, Mindfulness-based Strategies, Motivational Interviewing, Solution-Focused Therapy, and Supportive Psychotherapy to address her mood regulation and relationship concerns.    Behavioral Health Treatment Plan and Discharge Planning: Stephanie Schneider is aware of and agrees to continue to work on their treatment plan. They have " identified and are working toward their discharge goals. yes    Visit start and stop times:    07/12/24  Start Time: 0904  Stop Time: 0958  Total Visit Time: 54 minutes

## 2024-07-15 ENCOUNTER — OFFICE VISIT (OUTPATIENT)
Dept: FAMILY MEDICINE CLINIC | Facility: CLINIC | Age: 34
End: 2024-07-15
Payer: COMMERCIAL

## 2024-07-15 VITALS
OXYGEN SATURATION: 98 % | TEMPERATURE: 97.8 F | HEART RATE: 92 BPM | BODY MASS INDEX: 43.94 KG/M2 | HEIGHT: 63 IN | SYSTOLIC BLOOD PRESSURE: 140 MMHG | WEIGHT: 248 LBS | DIASTOLIC BLOOD PRESSURE: 100 MMHG

## 2024-07-15 DIAGNOSIS — K58.2 IRRITABLE BOWEL SYNDROME WITH BOTH CONSTIPATION AND DIARRHEA: Primary | ICD-10-CM

## 2024-07-15 DIAGNOSIS — G43.709 CHRONIC MIGRAINE WITHOUT AURA WITHOUT STATUS MIGRAINOSUS, NOT INTRACTABLE: ICD-10-CM

## 2024-07-15 PROCEDURE — 99213 OFFICE O/P EST LOW 20 MIN: CPT | Performed by: FAMILY MEDICINE

## 2024-07-15 RX ORDER — DIPHENOXYLATE HYDROCHLORIDE AND ATROPINE SULFATE 2.5; .025 MG/1; MG/1
1 TABLET ORAL 4 TIMES DAILY PRN
Qty: 30 TABLET | Refills: 0 | Status: SHIPPED | OUTPATIENT
Start: 2024-07-15

## 2024-07-15 RX ORDER — GALCANEZUMAB 120 MG/ML
INJECTION, SOLUTION SUBCUTANEOUS
Qty: 2 ML | Refills: 0 | Status: CANCELLED | OUTPATIENT
Start: 2024-07-15

## 2024-07-15 RX ORDER — PREDNISONE 10 MG/1
TABLET ORAL
Qty: 30 TABLET | Refills: 0 | Status: SHIPPED | OUTPATIENT
Start: 2024-07-15

## 2024-07-15 NOTE — TELEPHONE ENCOUNTER
Medication:     Galcanezumab-gnlm (Emgality) 120 MG/ML SOAJ       Dose/Frequency: One ml subcutaneous on the right thigh and 1 ml subcutaneous on the left thigh at the same time for 1 dose,     Quantity: 2 mL    Pharmacy:  [facility] - [place], [place] - [address] [place] [address]     Office:   [] PCP/Provider -   [x] Speciality/Provider -     Does the patient have enough for 3 days?   [] Yes   [x] No - Send as HP to POD

## 2024-07-15 NOTE — PROGRESS NOTES
"Assessment/Plan: Patient will continue with Bentyl as directed.  Patient use Lomotil as needed.  Patient use prednisone as directed given autoimmune issues.       Diagnoses and all orders for this visit:    Irritable bowel syndrome with both constipation and diarrhea  -     diphenoxylate-atropine (LOMOTIL) 2.5-0.025 mg per tablet; Take 1 tablet by mouth 4 (four) times a day as needed for diarrhea  -     predniSONE 10 mg tablet; 4 pills daily for 3 days, 3 for 3 days, 2 for 3 days, 1 for 3 days.            Subjective:        Patient ID: Cruz Schneider is a 34 y.o. female.      Patient is here with IBS flare.  Patient with increased stooling.  Patient with some nausea but no vomiting.  No fever noted.  Normal urination.  No hematochezia.  Patient is using Bentyl with some improvement.          The following portions of the patient's history were reviewed and updated as appropriate: allergies, current medications, past family history, past medical history, past social history, past surgical history and problem list.      Review of Systems   Constitutional: Negative.    HENT: Negative.     Eyes: Negative.    Respiratory: Negative.     Cardiovascular: Negative.    Gastrointestinal:  Positive for abdominal pain and diarrhea.   Endocrine: Negative.    Genitourinary: Negative.    Musculoskeletal: Negative.    Skin: Negative.    Allergic/Immunologic: Negative.    Neurological: Negative.    Hematological: Negative.    Psychiatric/Behavioral: Negative.             Objective:               /100 (BP Location: Right arm, Patient Position: Sitting, Cuff Size: Adult)   Pulse 92   Temp 97.8 °F (36.6 °C) (Tympanic)   Ht 5' 3\" (1.6 m)   Wt 112 kg (248 lb)   SpO2 98%   BMI 43.93 kg/m²          Physical Exam  Vitals and nursing note reviewed.   Constitutional:       General: She is not in acute distress.     Appearance: Normal appearance. She is not ill-appearing, toxic-appearing or diaphoretic.   Cardiovascular:      " Rate and Rhythm: Normal rate and regular rhythm.      Pulses: Normal pulses.      Heart sounds: Normal heart sounds.   Pulmonary:      Effort: Pulmonary effort is normal.      Breath sounds: Normal breath sounds.   Abdominal:      Palpations: Abdomen is soft.      Tenderness: There is no abdominal tenderness. There is no guarding or rebound.

## 2024-07-15 NOTE — TELEPHONE ENCOUNTER
Emgality loading dose was sent to perform on 5/14/24 and dispensed on 5/14    No active order for maintenance    Pended script below, please review and sign if agreeable    Thank you!

## 2024-07-16 ENCOUNTER — OFFICE VISIT (OUTPATIENT)
Dept: PHYSICAL THERAPY | Age: 34
End: 2024-07-16
Payer: COMMERCIAL

## 2024-07-16 DIAGNOSIS — G89.29 CHRONIC LOW BACK PAIN, UNSPECIFIED BACK PAIN LATERALITY, UNSPECIFIED WHETHER SCIATICA PRESENT: ICD-10-CM

## 2024-07-16 DIAGNOSIS — M54.50 LUMBAR SPINE PAIN: Primary | ICD-10-CM

## 2024-07-16 DIAGNOSIS — M54.50 CHRONIC LOW BACK PAIN, UNSPECIFIED BACK PAIN LATERALITY, UNSPECIFIED WHETHER SCIATICA PRESENT: ICD-10-CM

## 2024-07-16 PROCEDURE — 97113 AQUATIC THERAPY/EXERCISES: CPT

## 2024-07-16 NOTE — TELEPHONE ENCOUNTER
Information gathered from closest [facility]    [facility] currently does not have access to pool or gym. They only have access to workout space.    [facility] stated that they have access to financial scholarship but that will only cover up 70% of membership costs and will require proof of income. Per site, regular membership is $25.00 for joining fee and $52.00/month afterwards (not including 70% discount).    Plan to discuss this information with the patient.    Outreached patient via phone at [phone]. The patient was not available and a voicemail was left with callback instructions for this writer at [phone].

## 2024-07-16 NOTE — PROGRESS NOTES
"Daily Note     Today's date: 2024  Patient name: Cruz Schneider  : 1990  MRN: [id number]  Referring provider: Oni Beckham DO  Dx:   Encounter Diagnosis     ICD-10-CM    1. Lumbar spine pain  M54.50       2. Chronic low back pain, unspecified back pain laterality, unspecified whether sciatica present  M54.50     G89.29                        Subjective: Pt noted \"I have had an IBS flare for days. I am finally feeling better. I am sore and tired\"       Objective: See treatment diary below      Assessment: Fair tolerance to exercises with breaks.       Plan: Cont with plan of care      Access Code: [email or web address]  URL: [email or web address]  Date: 2024  Prepared by: Benjamin Machuca    Exercises  - Seated Cervical Retraction  - 3-4 x daily - 7 x weekly - 2 sets - 5 reps  - Lying Prone  - 2-3 x daily - 7 x weekly - 1 sets - 1 reps - 2-5 minutes hold  - Prone Press Up  - 2-3 x daily - 7 x weekly - 2 sets - 5 reps      Precautions: Patient's PMHx is remarkable for AS, Migraine, HTN, GERD, IBS, Myofascial Muscle Pain, Restless leg syndrome, Vitamin B 12 and D Deficiency.    Manuals 24                                                              Neuro Re-Ed                                       HEP: cervical retraction 5 x              HEP lumbar extension  via prone press ups 5 x 2                                                   Ther Ex                          Water walking pre and post  5x  5x           Standing lumbar spine extension against counter  20x  20x           Seated hamstring stretch:B:  20sec 5x  20sec 5x           LAQ:B:  20x  20x           Hip flexion:B:  20x  20x           Seated postural correction slough over correct  20x  20x           Cervical spine retraction  20sec 5x  20sec 5x           UT stretch:B:  20sec 5x  20sec 5x           LS stretch:B:  NT NT                       Standing scapular squeezes  20X 2SEC  20X 3SEC           Standing abdominal " contraction with ball push down  20X  20X           Standing slr x 3:B:  20X  20X           Standing hr and tr:B:  20X  20X           Mini squats  20X  20X            SLS and tandem stance:B:  NT NT          Side stepping and tandem ambulation  6X  6X          Forward step ups:B:  NT NT                       Paddles rows, horizontal add / abd and shoulder add / abd  NT NT          Pool pony bicycle  NT 5 MIN                        Pool pony hang  NT 5 MIN                        Q/L st B   20sec 3x  NT                                                   Ther Activity                                       Gait Training                                       Modalities

## 2024-07-17 ENCOUNTER — OFFICE VISIT (OUTPATIENT)
Dept: DENTISTRY | Facility: CLINIC | Age: 34
End: 2024-07-17

## 2024-07-17 VITALS — TEMPERATURE: 97.5 F | SYSTOLIC BLOOD PRESSURE: 136 MMHG | HEART RATE: 77 BPM | DIASTOLIC BLOOD PRESSURE: 90 MMHG

## 2024-07-17 DIAGNOSIS — K02.9 DENTAL CARIES: ICD-10-CM

## 2024-07-17 DIAGNOSIS — Z01.20 ENCOUNTER FOR DENTAL EXAMINATION: Primary | ICD-10-CM

## 2024-07-17 PROCEDURE — D2392 RESIN-BASED COMPOSITE - 2 SURFACES, POSTERIOR: HCPCS

## 2024-07-17 RX ORDER — SODIUM FLUORIDE 5 MG/G
GEL, DENTIFRICE DENTAL
Qty: 100 ML | Refills: 0 | Status: SHIPPED | OUTPATIENT
Start: 2024-07-17

## 2024-07-17 NOTE — TELEPHONE ENCOUNTER
Made additional outreaches to [employer] [facility] and [email or web address] regarding pool membership access.    Both programs denied financial scholarship/sponsorship availability.    The [facility] and [employer] mentioned outreaching insurance provider for fitness membership eligibility options. This writer outreached [facility] for generalized information, not specific to this patient, and the insurance provider stated that there are no fitness membership benefits for medical assistance plans.

## 2024-07-17 NOTE — DENTAL PROCEDURE DETAILS
Composite Filling    Cruz Schneider presents for composite filling. PMH reviewed, no changes.    Discussed with patient need for RCT if pulp exposure occurs or in future if pulp is inflamed. Pt understands and consents.    Applied topical benzocaine, administered 1 carp 4% articaine 1:100k epi via buccal infiltration     Prepped tooth #21 MO with 8835 moshe on high speed. Caries removed with round carbide on slow speed. Placed Mylar matrix and wooden wedge. Isolation with cotton rolls     Etch with 37% H2PO4, rinse, dry. Applied Adhese with 20 second scrub once, gentle air dry and light cured for 10s. Restored with Tetric bulk haley shade A2 and light cured.    Refined with finishing burs, polished with enhance point. Verified occlusion and contacts.     An Rx from [facility] 5000 Gel was sent to the patient pharmacy of record (a request for a no flavor version was made).     Pt left satisfied.    NV: #24, 25, 26 F, 27 DF

## 2024-07-18 ENCOUNTER — PATIENT MESSAGE (OUTPATIENT)
Dept: FAMILY MEDICINE CLINIC | Facility: CLINIC | Age: 34
End: 2024-07-18

## 2024-07-18 ENCOUNTER — OFFICE VISIT (OUTPATIENT)
Dept: PHYSICAL THERAPY | Age: 34
End: 2024-07-18
Payer: COMMERCIAL

## 2024-07-18 DIAGNOSIS — E66.01 CLASS 3 SEVERE OBESITY DUE TO EXCESS CALORIES WITH SERIOUS COMORBIDITY AND BODY MASS INDEX (BMI) OF 45.0 TO 49.9 IN ADULT (HCC): Primary | ICD-10-CM

## 2024-07-18 DIAGNOSIS — M54.50 LUMBAR SPINE PAIN: Primary | ICD-10-CM

## 2024-07-18 DIAGNOSIS — G89.29 CHRONIC LOW BACK PAIN, UNSPECIFIED BACK PAIN LATERALITY, UNSPECIFIED WHETHER SCIATICA PRESENT: ICD-10-CM

## 2024-07-18 DIAGNOSIS — M54.50 CHRONIC LOW BACK PAIN, UNSPECIFIED BACK PAIN LATERALITY, UNSPECIFIED WHETHER SCIATICA PRESENT: ICD-10-CM

## 2024-07-18 PROCEDURE — 97113 AQUATIC THERAPY/EXERCISES: CPT

## 2024-07-18 NOTE — PROGRESS NOTES
"Daily Note     Today's date: 2024  Patient name: Cruz Schneider  : 1990  MRN: [id number]  Referring provider: Oni Beckham DO  Dx:   Encounter Diagnosis     ICD-10-CM    1. Lumbar spine pain  M54.50       2. Chronic low back pain, unspecified back pain laterality, unspecified whether sciatica present  M54.50     G89.29                      Subjective: Pt reports some left anterior hip pain today.       Objective: See treatment diary below      Assessment: Pt did well with all TE as listed, reports no increased pain throughout tx session.       Plan: Continue per plan of care.      Access Code: [email or web address]  URL: [email or web address]  Date: 2024  Prepared by: Benjamin Machuca    Exercises  - Seated Cervical Retraction  - 3-4 x daily - 7 x weekly - 2 sets - 5 reps  - Lying Prone  - 2-3 x daily - 7 x weekly - 1 sets - 1 reps - 2-5 minutes hold  - Prone Press Up  - 2-3 x daily - 7 x weekly - 2 sets - 5 reps      Precautions: Patient's PMHx is remarkable for AS, Migraine, HTN, GERD, IBS, Myofascial Muscle Pain, Restless leg syndrome, Vitamin B 12 and D Deficiency.    Manuals 24                                                             Neuro Re-Ed                                       HEP: cervical retraction 5 x              HEP lumbar extension  via prone press ups 5 x 2                                                   Ther Ex                          Water walking pre and post  5x  5x  5x         Standing lumbar spine extension against counter  20x  20x  20x         Seated hamstring stretch:B:  20sec 5x  20sec 5x  20\" 5x         LAQ:B:  20x  20x  20x         Hip flexion:B:  20x  20x  20x         Seated postural correction slough over correct  20x  20x  20x         Cervical spine retraction  20sec 5x  20sec 5x  20\"x5          UT stretch:B:  20sec 5x  20sec 5x  20\"x5         LS stretch:B:  NT NT                       Standing scapular squeezes  20X 2SEC  20X 3SEC  20x " "3\"         Standing abdominal contraction with ball push down  20X  20X  20x         Standing slr x 3:B:  20X  20X  20x         Standing hr and tr:B:  20X  20X  20x         Mini squats  20X  20X  20x           SLS and tandem stance:B:  NT NT          Side stepping and tandem ambulation  6X  6X 6x         Forward step ups:B:  NT NT                       Paddles rows, horizontal add / abd and shoulder add / abd  NT NT          Pool pony bicycle  NT 5 MIN  5 MIN                      Pool pony hang  NT 5 MIN  5 MIN                      Q/L st B   20sec 3x  NT                                                   Ther Activity                                       Gait Training                                       Modalities                                              "

## 2024-07-19 ENCOUNTER — TELEMEDICINE (OUTPATIENT)
Dept: BEHAVIORAL/MENTAL HEALTH CLINIC | Facility: CLINIC | Age: 34
End: 2024-07-19
Payer: COMMERCIAL

## 2024-07-19 DIAGNOSIS — F41.1 GENERALIZED ANXIETY DISORDER: Chronic | ICD-10-CM

## 2024-07-19 DIAGNOSIS — F33.1 DEPRESSION, MAJOR, RECURRENT, MODERATE (HCC): Primary | Chronic | ICD-10-CM

## 2024-07-19 PROCEDURE — 90834 PSYTX W PT 45 MINUTES: CPT | Performed by: SOCIAL WORKER

## 2024-07-19 NOTE — PSYCH
Virtual Regular Visit    Verification of patient location:    Patient is located at Home in the following state in which I hold an active license [place]      Assessment/Plan:    Problem List Items Addressed This Visit          Behavioral Health    Generalized anxiety disorder (Chronic)    Depression, major, recurrent, moderate (HCC) - Primary (Chronic)       Goals addressed in session: Goal 1          Reason for visit is   Chief Complaint   Patient presents with    Virtual Regular Visit          Encounter provider APARNA Fox      Recent Visits  Date Type Provider Dept   07/15/24 Office Visit Oni Beckham DO [facility]   07/12/24 Telemedicine AP[facility]NA Fox  [facility] [place]   Showing recent visits within past 7 days and meeting all other requirements  Today's Visits  Date Type Provider Dept   07/19/24 Telemedicine [facility]NA Fox [facility] [place]   Showing today's visits and meeting all other requirements  Future Appointments  No visits were found meeting these conditions.  Showing future appointments within next 150 days and meeting all other requirements       The patient was identified by name and date of birth. Cruz Schneider was informed that this is a telemedicine visit and that the visit is being conducted throughthe Epic Embedded platform. She agrees to proceed..  My office door was closed. No one else was in the room.  She acknowledged consent and understanding of privacy and security of the video platform. The patient has agreed to participate and understands they can discontinue the visit at any time.    Patient is aware this is a billable service.     Subjective  Cruz Schneider is a 34 y.o. female.      HPI     Past Medical History:   Diagnosis Date    Abnormal Pap smear of cervix     Allergic     Anxiety     Arthritis     Dental caries     Depression     Fibromyalgia, primary     GERD (gastroesophageal reflux disease)      Hypertension     IBS (irritable bowel syndrome)     Migraine     Obesity     Vitamin B12 deficiency        Past Surgical History:   Procedure Laterality Date    COLONOSCOPY N/A 5/8/2018    Procedure: COLONOSCOPY;  Surgeon: Stephanie Alston MD;  Location: [facility] GI [facility];  Service: Gastroenterology    NJ EXC B9 LESION MRGN XCP SK TG S/N/H/F/G > 4.0CM N/A 7/1/2021    Procedure: EXCISION OF PILAR SCALP CYST X 2 AND RIGHT INNER GROIN NEVVUS;  Surgeon: Denis Barron MD;  Location:  MAIN OR;  Service: Plastics    NJ REPAIR COMPLEX SCALP/ARM/LEG 2.6-7.5 CM N/A 7/1/2021    Procedure: CLOSURE WOUND SCALP X 2 AND RIGHT INNER GROIN;  Surgeon: Denis Barron MD;  Location:  MAIN OR;  Service: Plastics    TONSILLECTOMY      WISDOM TOOTH EXTRACTION         Current Outpatient Medications   Medication Sig Dispense Refill    ALPRAZolam (XANAX) 0.5 mg tablet Take 1 tablet (0.5 mg total) by mouth 3 (three) times a day as needed for anxiety 90 tablet 2    ascorbic acid (VITAMIN C) 500 MG tablet Take 500 mg by mouth daily      Botox 200 units SOLR       buPROPion (WELLBUTRIN XL) 300 mg 24 hr tablet Take 1 tablet (300 mg total) by mouth daily 90 tablet 0    celecoxib (CeleBREX) 200 mg capsule Take 1 capsule (200 mg total) by mouth 2 (two) times a day (Patient not taking: Reported on 7/7/2024) 60 capsule 3    Cholecalciferol (Vitamin D3) 50 MCG (2000 UT) capsule Take 1 capsule (2,000 Units total) by mouth daily 90 capsule 1    Cholecalciferol (Vitamin D3) 50 MCG (2000 UT) TABS       Cyanocobalamin (Vitamin B-12) 500 MCG SUBL Place 1 tablet (500 mcg total) under the tongue in the morning 30 tablet 2    cyanocobalamin 1,000 mcg/mL 1 IM injection every month 3 mL 3    dicyclomine (BENTYL) 10 mg capsule Take 1 capsule (10 mg total) by mouth 4 (four) times a day (before meals and at bedtime) 120 capsule 1    diphenoxylate-atropine (LOMOTIL) 2.5-0.025 mg per tablet Take 1 tablet by mouth 4 (four) times a day as needed for diarrhea 30 tablet  0    escitalopram (LEXAPRO) 20 mg tablet Take 1 tablet (20 mg total) by mouth daily 90 tablet 0    esomeprazole (NexIUM) 40 MG capsule Take 40 mg by mouth      etonogestrel-ethinyl estradiol (NuvaRing) 0.12-0.015 MG/24HR vaginal ring Insert ring for 21 days then remove for one week. 3 each 4    FeroSul 325 (65 Fe) MG tablet Take 1 tablet (325 mg total) by mouth in the morning 90 tablet 1    gabapentin (Neurontin) 300 mg capsule Take 1 capsule (300 mg total) by mouth 3 (three) times a day 90 capsule 5    Galcanezumab-gnlm (Emgality) 120 MG/ML SOAJ One ml subcutaneous on the right thigh and 1 ml subcutaneous on the left thigh at the same time for 1 dose 2 mL 0    Galcanezumab-gnlm 120 MG/ML SOAJ Inject 120 mg under the skin every 30 (thirty) days 1 mL 11    indomethacin (INDOCIN) 25 mg capsule 1-2 tabs BID prn severe headache with food. Hold toradol. 30 capsule 6    loperamide (IMODIUM) 2 mg capsule Take 1 capsule (2 mg total) by mouth 4 (four) times a day as needed for diarrhea 12 capsule 0    magnesium Oxide (MAG-OX) 400 mg TABS Take 1 tablet (400 mg total) by mouth daily 30 tablet 5    methocarbamol (ROBAXIN) 500 mg tablet Take 1 tablet (500 mg total) by mouth 3 (three) times a day 270 tablet 1    mupirocin (BACTROBAN) 2 % ointment Apply topically 2 (two) times a day (Patient not taking: Reported on 7/7/2024) 15 g 0    nystatin-triamcinolone (MYCOLOG-II) cream Apply topically 2 (two) times a day (Patient not taking: Reported on 7/7/2024) 30 g 1    OLANZapine (ZyPREXA) 5 mg tablet Take 1 tablet (5 mg total) by mouth daily at bedtime Do not take with reglan. 30 tablet 2    onabotulinumtoxin A (BOTOX) 100 units Inject as directed      ondansetron (ZOFRAN) 4 mg tablet Take 1 tablet (4 mg total) by mouth every 6 (six) hours 30 tablet 2    phentermine (ADIPEX-P) 37.5 MG tablet Take 1 tablet (37.5 mg total) by mouth in the morning 30 tablet 3    prazosin (MINIPRESS) 2 mg capsule Take 1 capsule (2 mg total) by mouth daily  at bedtime 90 capsule 0    predniSONE 10 mg tablet 4 pills daily for 3 days, 3 for 3 days, 2 for 3 days, 1 for 3 days. 30 tablet 0    SODIUM FLUORIDE, DENTAL GEL, 1.1 % GEL After brushing thoroughly with toothpaste, rinse as usual. Apply a thin ribbon of gel to the teeth with a toothbrush  once daily for at least one minute, preferably at bedtime.After use, expectorate gel. For best results, do not eat, drink or rinse for 30 minutes. 100 mL 0    Trudhesa 0.725 MG/ACT AERS 1 spray in each nostril for total dose of 1.45mg, may repeat 1 dose after 1 hour. Max 2 doses per 24 hours and 3 doses per week. 12 mL 6     Current Facility-Administered Medications   Medication Dose Route Frequency Provider Last Rate Last Admin    cyanocobalamin injection 1,000 mcg  1,000 mcg Intramuscular Q30 Days Oni Beckham, DO   1,000 mcg at 03/14/24 1450        Allergies   Allergen Reactions    Cimzia [Certolizumab Pegol] Swelling    Desvenlafaxine      Other reaction(s): state of confusion    Ixekizumab Vomiting    Seasonal Ic [Octacosanol] Nasal Congestion    Voltaren [Diclofenac] Swelling    Tizanidine Anxiety       Review of Systems    Video Exam    There were no vitals filed for this visit.    Physical Exam     Behavioral Health Psychotherapy Progress Note    Psychotherapy Provided: Individual Psychotherapy     1. Depression, major, recurrent, moderate (HCC)        2. Generalized anxiety disorder            Goals addressed in session: Goal 1     DATA: Met with Stephanie for her scheduled individual session. She discussed her recent medical appointments and her medical symptoms. She states that she is starting to feel somewhat better. Stephanie discussed her family relationships and the work that she and her mother are doing to prepare their home for sale. Stephanie spent some time discussing her relationship with Jacqueline. She states that she feels that [facility] is not able to provide her with the support that Stephanie needs. We discussed her desire  "to build her support network. Stephanie states that she received a [facility] from [employer] re: the request for assistance with applying for a scholarship for the [facility]. She states that she will call him back, and start this process. We discussed and scheduled future appointments.     During this session, this clinician used the following therapeutic modalities: Client-centered Therapy, Dialectical Behavior Therapy, Mindfulness-based Strategies, Motivational Interviewing, Solution-Focused Therapy, and Supportive Psychotherapy    Substance Abuse was not addressed during this session. If the client is diagnosed with a co-occurring substance use disorder, please indicate any changes in the frequency or amount of use: n/a. Stage of change for addressing substance use diagnoses: No substance use/Not applicable    ASSESSMENT:  Stephanie Schneider presents with a Euthymic/ normal mood.     her affect is Normal range and intensity, which is congruent, with her mood and the content of the session. The client has made progress on their goals.    Stephanie Schneider presents with a minimal risk of suicide, minimal risk of self-harm, and minimal risk of harm to others.    For any risk assessment that surpasses a \"low\" rating, a safety plan must be developed.    A safety plan was indicated: no  If yes, describe in detail n/a    PLAN: Between sessions, Stephanie Schneider will continue to monitor her moods. She will call the CM to discuss her desire to attend the [facility]. At the next session, the therapist will use Client-centered Therapy, Dialectical Behavior Therapy, Mindfulness-based Strategies, Motivational Interviewing, Solution-Focused Therapy, and Supportive Psychotherapy to address her mood regulation and relationships.    Behavioral Health Treatment Plan and Discharge Planning: Stephanie Schneider is aware of and agrees to continue to work on their treatment plan. They have identified and are working toward their discharge goals. " yes    Visit start and stop times:    07/19/24  Start Time: 0814  Stop Time: 0901  Total Visit Time: 47 minutes

## 2024-07-23 ENCOUNTER — OFFICE VISIT (OUTPATIENT)
Dept: PHYSICAL THERAPY | Age: 34
End: 2024-07-23
Payer: COMMERCIAL

## 2024-07-23 DIAGNOSIS — M54.50 LUMBAR SPINE PAIN: ICD-10-CM

## 2024-07-23 DIAGNOSIS — M54.50 CHRONIC LOW BACK PAIN, UNSPECIFIED BACK PAIN LATERALITY, UNSPECIFIED WHETHER SCIATICA PRESENT: Primary | ICD-10-CM

## 2024-07-23 DIAGNOSIS — G89.29 CHRONIC LOW BACK PAIN, UNSPECIFIED BACK PAIN LATERALITY, UNSPECIFIED WHETHER SCIATICA PRESENT: Primary | ICD-10-CM

## 2024-07-23 PROCEDURE — 97113 AQUATIC THERAPY/EXERCISES: CPT

## 2024-07-23 RX ORDER — SEMAGLUTIDE 0.25 MG/.5ML
INJECTION, SOLUTION SUBCUTANEOUS
Qty: 2 ML | Refills: 0 | Status: SHIPPED | OUTPATIENT
Start: 2024-07-23

## 2024-07-24 ENCOUNTER — TELEPHONE (OUTPATIENT)
Dept: FAMILY MEDICINE CLINIC | Facility: CLINIC | Age: 34
End: 2024-07-24

## 2024-07-24 NOTE — TELEPHONE ENCOUNTER
Auth needed for Wegovy 0.25mg/0.5ml. Inject 0.25mg every week. Dispense 2ml with no refills. Dx: E66.01, Z68.42    Tried/Failed: Phentermine  Hx of HTN, GERD, Hyperlipids, PreDM  Starting BMI: 43.93, Ht: 63in, Wt: 248lb    CMM Key: [id number]  Prescribed by Dr Beckham

## 2024-07-25 ENCOUNTER — OFFICE VISIT (OUTPATIENT)
Dept: PHYSICAL THERAPY | Age: 34
End: 2024-07-25
Payer: COMMERCIAL

## 2024-07-25 DIAGNOSIS — G89.29 CHRONIC LOW BACK PAIN, UNSPECIFIED BACK PAIN LATERALITY, UNSPECIFIED WHETHER SCIATICA PRESENT: Primary | ICD-10-CM

## 2024-07-25 DIAGNOSIS — M54.50 CHRONIC LOW BACK PAIN, UNSPECIFIED BACK PAIN LATERALITY, UNSPECIFIED WHETHER SCIATICA PRESENT: Primary | ICD-10-CM

## 2024-07-25 DIAGNOSIS — K29.00 ACUTE SUPERFICIAL GASTRITIS WITHOUT HEMORRHAGE: Primary | ICD-10-CM

## 2024-07-25 DIAGNOSIS — M54.50 LUMBAR SPINE PAIN: ICD-10-CM

## 2024-07-25 PROCEDURE — 97113 AQUATIC THERAPY/EXERCISES: CPT

## 2024-07-25 RX ORDER — ESOMEPRAZOLE MAGNESIUM 40 MG/1
40 CAPSULE, DELAYED RELEASE ORAL DAILY
Qty: 90 CAPSULE | Refills: 3 | Status: SHIPPED | OUTPATIENT
Start: 2024-07-25

## 2024-07-25 RX ORDER — ESOMEPRAZOLE MAGNESIUM 40 MG/1
40 CAPSULE, DELAYED RELEASE ORAL
Status: CANCELLED | OUTPATIENT
Start: 2024-07-25

## 2024-07-25 NOTE — PROGRESS NOTES
"Daily Note     Today's date: 2024  Patient name: Cruz Schneider  : 1990  MRN: [id number]  Referring provider: Oni Beckham DO  Dx:   Encounter Diagnosis     ICD-10-CM    1. Chronic low back pain, unspecified back pain laterality, unspecified whether sciatica present  M54.50     G89.29       2. Lumbar spine pain  M54.50                      Subjective: Patient states her rib cage is more sore this morning and feels it has to be the rainy weather.       Objective: See treatment diary below      Assessment: Tolerated treatment well. Some relief from pool session , more rotational exercises tend to give the most relief and less pressure off of the ribs.  Patient would benefit from continued PT       Plan: Progress treatment as tolerated.   Increase reps as able.      Access Code: [email or web address]  URL: [email or web address]  Date: 2024  Prepared by: Benjamin Machuca    Exercises  - Seated Cervical Retraction  - 3-4 x daily - 7 x weekly - 2 sets - 5 reps  - Lying Prone  - 2-3 x daily - 7 x weekly - 1 sets - 1 reps - 2-5 minutes hold  - Prone Press Up  - 2-3 x daily - 7 x weekly - 2 sets - 5 reps      Precautions: Patient's PMHx is remarkable for AS, Migraine, HTN, GERD, IBS, Myofascial Muscle Pain, Restless leg syndrome, Vitamin B 12 and D Deficiency.    Manuals 24                                                           Neuro Re-Ed                                       HEP: cervical retraction 5 x              HEP lumbar extension  via prone press ups 5 x 2                                                   Ther Ex                          Water walking pre and post  5x  5x  5x x5 X 5        Standing lumbar spine extension against counter  20x  20x  20x 20x X 20        Seated hamstring stretch:B:  20sec 5x  20sec 5x  20\" 5x  20\" x 5        LAQ:B:  20x  20x  20x 20x X 20        Hip flexion:B:  20x  20x  20x  X 20        Seated postural correction slough over correct  " "20x  20x  20x 20x X 20        Cervical spine retraction  20sec 5x  20sec 5x  20\"x5   20 \" x 5        UT stretch:B:  20sec 5x  20sec 5x  20\"x5 20\"x5 20\" x 5        LS stretch:B:  NT NT                       Standing scapular squeezes  20X 2SEC  20X 3SEC  20x 3\" 20\"x3 20\" x 3       Standing abdominal contraction with ball push down  20X  20X  20x  X 20        Standing slr x 3:B:  20X  20X  20x 20x X 20        Standing hr and tr:B:  20X  20X  20x 20x X 20        Mini squats  20X  20X  20x  20x X 20         SLS and tandem stance:B:  NT NT          Side stepping and tandem ambulation  6X  6X 6x 6x X 6        Forward step ups:B:  NT NT                       Paddles rows, horizontal add / abd and shoulder add / abd  NT NT          Pool pony bicycle  NT 5 MIN  5 MIN 5 min X 5 min                     Pool pony hang  NT 5 MIN  5 MIN 5 min  X 5 min                     Q/L st B   20sec 3x  NT 20sec x3 [id number] 20 sec x 3                                                Ther Activity                                       Gait Training                                       Modalities                                                "

## 2024-07-30 ENCOUNTER — OFFICE VISIT (OUTPATIENT)
Dept: PHYSICAL THERAPY | Age: 34
End: 2024-07-30
Payer: COMMERCIAL

## 2024-07-30 DIAGNOSIS — M54.50 LUMBAR SPINE PAIN: ICD-10-CM

## 2024-07-30 DIAGNOSIS — G89.29 CHRONIC LOW BACK PAIN, UNSPECIFIED BACK PAIN LATERALITY, UNSPECIFIED WHETHER SCIATICA PRESENT: Primary | ICD-10-CM

## 2024-07-30 DIAGNOSIS — M54.50 CHRONIC LOW BACK PAIN, UNSPECIFIED BACK PAIN LATERALITY, UNSPECIFIED WHETHER SCIATICA PRESENT: Primary | ICD-10-CM

## 2024-07-30 PROCEDURE — 97113 AQUATIC THERAPY/EXERCISES: CPT

## 2024-07-30 NOTE — PROGRESS NOTES
"Daily Note     Today's date: 2024  Patient name: Cruz Schneider  : 1990  MRN: [id number]  Referring provider: Oni Beckham DO  Dx:   Encounter Diagnosis     ICD-10-CM    1. Chronic low back pain, unspecified back pain laterality, unspecified whether sciatica present  M54.50     G89.29       2. Lumbar spine pain  M54.50                      Subjective: Pt reports L hip pain and has an \"arthritis ache\" which she attributes to the weather. Also notes a \"nerve pain\" in the front of her L leg that started today. She took a gabapentin which she is hoping helps. She notes that leg pain is worse with rotational movements of trunk.       Objective: See treatment diary below      Assessment: Pt responds positively to TE in pool to improve mobility and reduce symptoms. Good recall of proper form/technique for exercises.  Tolerated treatment well. Patient demonstrated fatigue post treatment, exhibited good technique with therapeutic exercises, and would benefit from continued PT      Plan: Continue per plan of care.  Progress treatment as tolerated.       Access Code: [email or web address]  URL: [email or web address]  Date: 2024  Prepared by: Benjamin Machuca    Exercises  - Seated Cervical Retraction  - 3-4 x daily - 7 x weekly - 2 sets - 5 reps  - Lying Prone  - 2-3 x daily - 7 x weekly - 1 sets - 1 reps - 2-5 minutes hold  - Prone Press Up  - 2-3 x daily - 7 x weekly - 2 sets - 5 reps      Precautions: Patient's PMHx is remarkable for AS, Migraine, HTN, GERD, IBS, Myofascial Muscle Pain, Restless leg syndrome, Vitamin B 12 and D Deficiency.    Manuals 24                                                           Neuro Re-Ed                                       HEP: cervical retraction 5 x              HEP lumbar extension  via prone press ups 5 x 2                                                   Ther Ex                          Water walking pre and post  5x  5x  5x x5 " "X 5  x5      Standing lumbar spine extension against counter  20x  20x  20x 20x X 20                     Seated hamstring stretch:B:  20sec 5x  20sec 5x  20\" 5x  20\" x 5  20\"x5      LAQ:B:  20x  20x  20x 20x X 20  x20      Hip flexion:B:  20x  20x  20x  X 20  x20      Seated postural correction slough over correct  20x  20x  20x 20x X 20  x20      Cervical spine retraction  20sec 5x  20sec 5x  20\"x5   20 \" x 5  20\"x5      UT stretch:B:  20sec 5x  20sec 5x  20\"x5 20\"x5 20\" x 5  20\"x5      LS stretch:B:  NT NT                       Standing scapular squeezes  20X 2SEC  20X 3SEC  20x 3\" 20\"x3 20\" x 3 20\"x3      Standing abdominal contraction with ball push down  20X  20X  20x  X 20  x20      Standing slr x 3:B:  20X  20X  20x 20x X 20  x20      Standing hr and tr:B:  20X  20X  20x 20x X 20  x20      Mini squats  20X  20X  20x  20x X 20  x20       SLS and tandem stance:B:  NT NT          Side stepping and tandem ambulation  6X  6X 6x 6x X 6  x6      Forward step ups:B:  NT NT                       Paddles rows, horizontal add / abd and shoulder add / abd  NT NT          Pool pony bicycle  NT 5 MIN  5 MIN 5 min X 5 min  X5 min                   Pool pony hang  NT 5 MIN  5 MIN 5 min  X 5 min  X5 min                   Q/L st B   20sec 3x  NT 20sec x3 [id number] 20 sec x 3 20 sec x3                                               Ther Activity                                       Gait Training                                       Modalities                                                  "

## 2024-08-01 ENCOUNTER — OFFICE VISIT (OUTPATIENT)
Dept: PHYSICAL THERAPY | Age: 34
End: 2024-08-01
Payer: COMMERCIAL

## 2024-08-01 DIAGNOSIS — M54.50 CHRONIC LOW BACK PAIN, UNSPECIFIED BACK PAIN LATERALITY, UNSPECIFIED WHETHER SCIATICA PRESENT: Primary | ICD-10-CM

## 2024-08-01 DIAGNOSIS — M54.50 LUMBAR SPINE PAIN: ICD-10-CM

## 2024-08-01 DIAGNOSIS — G89.29 CHRONIC LOW BACK PAIN, UNSPECIFIED BACK PAIN LATERALITY, UNSPECIFIED WHETHER SCIATICA PRESENT: Primary | ICD-10-CM

## 2024-08-01 PROCEDURE — 97113 AQUATIC THERAPY/EXERCISES: CPT | Performed by: PHYSICAL THERAPIST

## 2024-08-01 NOTE — PROGRESS NOTES
Daily Note     Today's date: 2024  Patient name: Cruz Schneider  : 1990  MRN: [id number]  Referring provider: Oni Beckham DO  Dx:   Encounter Diagnosis     ICD-10-CM    1. Chronic low back pain, unspecified back pain laterality, unspecified whether sciatica present  M54.50     G89.29       2. Lumbar spine pain  M54.50                      Subjective: Patient reported bilateral rib region pain persists at moderate to severe levels while left hip and lumbar spine region pain persists at moderate levels.  Patient noted all standing based functional activities are limited by lumbar spine and left hip region pain.     Objective: See treatment diary below      Assessment: Patient presents with short term lumbar spine pain reduction with standing lumbar spine extension as her directional preference while cervical spine retraction and postural correction present as her primary cervical spine directional preference with both creating short term pain reduction and functional progress.  But, she lacks long term pain reduction that limits both standing and seated functional activities respectively that exhibits her cervical and lumbar spine pain limitations to functional activities.  Thus, PT is warranted to provide long term cervical and lumbar spine pain reduction to promote self and house hold functional progress.      Plan: Continue per plan of care.  Progress treatment as tolerated.       Access Code: [email or web address]  URL: [email or web address]  Date: 2024  Prepared by: Benjamin Machuca    Exercises  - Seated Cervical Retraction  - 3-4 x daily - 7 x weekly - 2 sets - 5 reps  - Lying Prone  - 2-3 x daily - 7 x weekly - 1 sets - 1 reps - 2-5 minutes hold  - Prone Press Up  - 2-3 x daily - 7 x weekly - 2 sets - 5 reps      Precautions: Patient's PMHx is remarkable for AS, Migraine, HTN, GERD, IBS, Myofascial Muscle Pain, Restless leg syndrome, Vitamin B 12 and D Deficiency.    Manuals 24  "7/16 7/18 7/23 7/25 7/30 8/1                                                         Neuro Re-Ed                                       HEP: cervical retraction 5 x              HEP lumbar extension  via prone press ups 5 x 2                                                   Ther Ex                          Water walking pre and post  5x  5x  5x x5 X 5  x5 5 x      Standing lumbar spine extension against counter  20x  20x  20x 20x X 20   2 x 10 new hep!                  Seated hamstring stretch:B:  20sec 5x  20sec 5x  20\" 5x  20\" x 5  20\"x5 20 sec x 5     LAQ:B:  20x  20x  20x 20x X 20  x20 2 x 10     Hip flexion:B:  20x  20x  20x  X 20  x20 2 x 10     Seated postural correction slough over correct  20x  20x  20x 20x X 20  x20 2 x 10     Cervical spine retraction  20sec 5x  20sec 5x  20\"x5   20 \" x 5  20\"x5 2 x 10     UT stretch:B:  20sec 5x  20sec 5x  20\"x5 20\"x5 20\" x 5  20\"x5 20 sec x 5     LS stretch:B:  NT NT     20 sec x 5                  Standing scapular squeezes  20X 2SEC  20X 3SEC  20x 3\" 20\"x3 20\" x 3 20\"x3 3 sec x 20     Standing abdominal contraction with ball push down  20X  20X  20x  X 20  x20 NT     Standing slr x 3:B:  20X  20X  20x 20x X 20  x20 2 x 10     Standing hr and tr:B:  20X  20X  20x 20x X 20  x20 2 x 10     Mini squats  20X  20X  20x  20x X 20  x20 2 x 10      SLS and tandem stance:B:  NT NT          Side stepping and tandem ambulation  6X  6X 6x 6x X 6  X 6 6 x      Forward step ups:B:  NT NT                       Paddles rows, horizontal add / abd and shoulder add / abd  NT NT          Pool pony bicycle  NT 5 MIN  5 MIN 5 min X 5 min  X5 min 5 min                  Pool pony hang  NT 5 MIN  5 MIN 5 min  X 5 min  X5 min 5 min                  Q/L st B   20sec 3x  NT 20sec x3 [id number] 20 sec x 3 20 sec x3 20 sec x 3                                              Ther Activity                                       Gait Training                                       Modalities                  "

## 2024-08-02 ENCOUNTER — OFFICE VISIT (OUTPATIENT)
Dept: FAMILY MEDICINE CLINIC | Facility: CLINIC | Age: 34
End: 2024-08-02
Payer: COMMERCIAL

## 2024-08-02 ENCOUNTER — TELEMEDICINE (OUTPATIENT)
Dept: BEHAVIORAL/MENTAL HEALTH CLINIC | Facility: CLINIC | Age: 34
End: 2024-08-02
Payer: COMMERCIAL

## 2024-08-02 VITALS
WEIGHT: 252 LBS | TEMPERATURE: 98.5 F | DIASTOLIC BLOOD PRESSURE: 72 MMHG | HEIGHT: 63 IN | HEART RATE: 85 BPM | SYSTOLIC BLOOD PRESSURE: 116 MMHG | BODY MASS INDEX: 44.65 KG/M2 | OXYGEN SATURATION: 98 %

## 2024-08-02 DIAGNOSIS — G47.01 INSOMNIA DUE TO MEDICAL CONDITION: ICD-10-CM

## 2024-08-02 DIAGNOSIS — F41.1 GENERALIZED ANXIETY DISORDER: Chronic | ICD-10-CM

## 2024-08-02 DIAGNOSIS — F33.1 DEPRESSION, MAJOR, RECURRENT, MODERATE (HCC): Primary | Chronic | ICD-10-CM

## 2024-08-02 DIAGNOSIS — L29.9 GENERALIZED PRURITUS: Primary | ICD-10-CM

## 2024-08-02 DIAGNOSIS — M79.7 FIBROMYALGIA: ICD-10-CM

## 2024-08-02 PROCEDURE — 99214 OFFICE O/P EST MOD 30 MIN: CPT | Performed by: FAMILY MEDICINE

## 2024-08-02 PROCEDURE — 90837 PSYTX W PT 60 MINUTES: CPT | Performed by: SOCIAL WORKER

## 2024-08-02 NOTE — PROGRESS NOTES
"Assessment/Plan: CMP CBC reviewed.  TSH reviewed.  Patient will start Zyrtec daily as directed for generalized pruritus.  Patient will use Benadryl as needed.  Patient will continue with gabapentin 2 pills nightly.  Refills will be given when needed.  To consider seeing allergist if worsens.  Follow-up per routine       Diagnoses and all orders for this visit:    Generalized pruritus    Fibromyalgia    Insomnia due to medical condition            Subjective:        Patient ID: Cruz Schneider is a 34 y.o. female.      Patient is here with itching issues.  Patient with history of fibromyalgia.  This is worsened over the past week and a half.  No new foods or topical agents being used at this time.  No new vitamins or supplements or meds.  Patient still having issues with insomnia.  This is worse over the past 2 weeks.  No racing thoughts.  Patient has has been using 2 gabapentin at night.          The following portions of the patient's history were reviewed and updated as appropriate: allergies, current medications, past family history, past medical history, past social history, past surgical history and problem list.      Review of Systems   Constitutional:         Generalized pruritus   HENT: Negative.     Eyes: Negative.    Respiratory: Negative.     Cardiovascular: Negative.    Gastrointestinal: Negative.    Endocrine: Negative.    Genitourinary: Negative.    Musculoskeletal:  Positive for arthralgias and myalgias.   Skin: Negative.    Allergic/Immunologic: Negative.    Neurological: Negative.    Hematological: Negative.    Psychiatric/Behavioral:  Positive for sleep disturbance.            Objective:               /72 (BP Location: Left arm, Patient Position: Sitting, Cuff Size: Large)   Pulse 85   Temp 98.5 °F (36.9 °C) (Temporal)   Ht 5' 3\" (1.6 m)   Wt 114 kg (252 lb)   SpO2 98%   BMI 44.64 kg/m²          Physical Exam  Vitals and nursing note reviewed.   Constitutional:       General: She is " not in acute distress.     Appearance: Normal appearance. She is not ill-appearing, toxic-appearing or diaphoretic.   HENT:      Head: Normocephalic and atraumatic.      Right Ear: Tympanic membrane, ear canal and external ear normal. There is no impacted cerumen.      Left Ear: Tympanic membrane, ear canal and external ear normal. There is no impacted cerumen.      Nose: Nose normal. No congestion or rhinorrhea.      Mouth/Throat:      Mouth: Mucous membranes are moist.      Pharynx: No oropharyngeal exudate or posterior oropharyngeal erythema.   Eyes:      General: No scleral icterus.        Right eye: No discharge.         Left eye: No discharge.   Neck:      Vascular: No carotid bruit.   Cardiovascular:      Rate and Rhythm: Normal rate and regular rhythm.      Pulses: Normal pulses.      Heart sounds: Normal heart sounds. No murmur heard.     No friction rub. No gallop.   Pulmonary:      Effort: Pulmonary effort is normal. No respiratory distress.      Breath sounds: Normal breath sounds. No stridor. No wheezing, rhonchi or rales.   Chest:      Chest wall: No tenderness.   Musculoskeletal:         General: No swelling, tenderness, deformity or signs of injury. Normal range of motion.      Cervical back: Normal range of motion and neck supple. No rigidity. No muscular tenderness.      Right lower leg: No edema.      Left lower leg: No edema.   Lymphadenopathy:      Cervical: No cervical adenopathy.   Skin:     General: Skin is warm and dry.      Capillary Refill: Capillary refill takes less than 2 seconds.      Coloration: Skin is not jaundiced.      Findings: No bruising, erythema, lesion or rash.   Neurological:      Mental Status: She is alert and oriented to person, place, and time.      Cranial Nerves: No cranial nerve deficit.      Sensory: No sensory deficit.      Motor: No weakness.      Coordination: Coordination normal.      Gait: Gait normal.   Psychiatric:         Mood and Affect: Mood normal.          Behavior: Behavior normal.         Thought Content: Thought content normal.         Judgment: Judgment normal.

## 2024-08-02 NOTE — PSYCH
Virtual Regular Visit    Verification of patient location:    Patient is located at Home in the following state in which I hold an active license [place]    Assessment/Plan:    Problem List Items Addressed This Visit          Behavioral Health    Generalized anxiety disorder (Chronic)    Depression, major, recurrent, moderate (HCC) - Primary (Chronic)     Goals addressed in session: Goal 1      Reason for visit is   Chief Complaint   Patient presents with    Virtual Regular Visit          Encounter provider APARNA Fox      Recent Visits  No visits were found meeting these conditions.  Showing recent visits within past 7 days and meeting all other requirements  Today's Visits  Date Type Provider Dept   08/02/24 Telemedicine APARNA Fox [facility] Therapist [place]   Showing today's visits and meeting all other requirements  Future Appointments  No visits were found meeting these conditions.  Showing future appointments within next 150 days and meeting all other requirements       The patient was identified by name and date of birth. Cruz Schneider was informed that this is a telemedicine visit and that the visit is being conducted throughthe Epic Embedded platform. She agrees to proceed..  My office door was closed. No one else was in the room.  She acknowledged consent and understanding of privacy and security of the video platform. The patient has agreed to participate and understands they can discontinue the visit at any time.    Patient is aware this is a billable service.     Subjective  Cruz Schneider is a 34 y.o. female.      HPI     Past Medical History:   Diagnosis Date    Abnormal Pap smear of cervix     Allergic     Anxiety     Arthritis     Dental caries     Depression     Fibromyalgia, primary     GERD (gastroesophageal reflux disease)     Hypertension     IBS (irritable bowel syndrome)     Migraine     Obesity     Vitamin B12 deficiency        Past Surgical History:    Procedure Laterality Date    COLONOSCOPY N/A 5/8/2018    Procedure: COLONOSCOPY;  Surgeon: Stephanie Alston MD;  Location: [facility] GI LAB;  Service: Gastroenterology    MA EXC B9 LESION MRGN XCP SK TG S/N/H/F/G > 4.0CM N/A 7/1/2021    Procedure: EXCISION OF PILAR SCALP CYST X 2 AND RIGHT INNER GROIN NEVVUS;  Surgeon: Denis Barron MD;  Location:  MAIN OR;  Service: Plastics    MA REPAIR COMPLEX SCALP/ARM/LEG 2.6-7.5 CM N/A 7/1/2021    Procedure: CLOSURE WOUND SCALP X 2 AND RIGHT INNER GROIN;  Surgeon: Denis Barron MD;  Location:  MAIN OR;  Service: Plastics    TONSILLECTOMY      WISDOM TOOTH EXTRACTION         Current Outpatient Medications   Medication Sig Dispense Refill    ALPRAZolam (XANAX) 0.5 mg tablet Take 1 tablet (0.5 mg total) by mouth 3 (three) times a day as needed for anxiety 90 tablet 2    ascorbic acid (VITAMIN C) 500 MG tablet Take 500 mg by mouth daily      Botox 200 units SOLR       buPROPion (WELLBUTRIN XL) 300 mg 24 hr tablet Take 1 tablet (300 mg total) by mouth daily 90 tablet 0    celecoxib (CeleBREX) 200 mg capsule Take 1 capsule (200 mg total) by mouth 2 (two) times a day (Patient not taking: Reported on 7/7/2024) 60 capsule 3    Cholecalciferol (Vitamin D3) 50 MCG (2000 UT) capsule Take 1 capsule (2,000 Units total) by mouth daily 90 capsule 1    Cholecalciferol (Vitamin D3) 50 MCG (2000 UT) TABS       Cyanocobalamin (Vitamin B-12) 500 MCG SUBL Place 1 tablet (500 mcg total) under the tongue in the morning 30 tablet 2    cyanocobalamin 1,000 mcg/mL 1 IM injection every month 3 mL 3    dicyclomine (BENTYL) 10 mg capsule Take 1 capsule (10 mg total) by mouth 4 (four) times a day (before meals and at bedtime) 120 capsule 1    diphenoxylate-atropine (LOMOTIL) 2.5-0.025 mg per tablet Take 1 tablet by mouth 4 (four) times a day as needed for diarrhea 30 tablet 0    escitalopram (LEXAPRO) 20 mg tablet Take 1 tablet (20 mg total) by mouth daily 90 tablet 0    esomeprazole (NexIUM) 40 MG  capsule Take 1 capsule (40 mg total) by mouth daily 90 capsule 3    etonogestrel-ethinyl estradiol (NuvaRing) 0.12-0.015 MG/24HR vaginal ring Insert ring for 21 days then remove for one week. 3 each 4    FeroSul 325 (65 Fe) MG tablet Take 1 tablet (325 mg total) by mouth in the morning 90 tablet 1    gabapentin (Neurontin) 300 mg capsule Take 1 capsule (300 mg total) by mouth 3 (three) times a day 90 capsule 5    Galcanezumab-gnlm (Emgality) 120 MG/ML SOAJ One ml subcutaneous on the right thigh and 1 ml subcutaneous on the left thigh at the same time for 1 dose 2 mL 0    Galcanezumab-gnlm 120 MG/ML SOAJ Inject 120 mg under the skin every 30 (thirty) days 1 mL 11    indomethacin (INDOCIN) 25 mg capsule 1-2 tabs BID prn severe headache with food. Hold toradol. 30 capsule 6    loperamide (IMODIUM) 2 mg capsule Take 1 capsule (2 mg total) by mouth 4 (four) times a day as needed for diarrhea 12 capsule 0    magnesium Oxide (MAG-OX) 400 mg TABS Take 1 tablet (400 mg total) by mouth daily 30 tablet 5    methocarbamol (ROBAXIN) 500 mg tablet Take 1 tablet (500 mg total) by mouth 3 (three) times a day 270 tablet 1    mupirocin (BACTROBAN) 2 % ointment Apply topically 2 (two) times a day (Patient not taking: Reported on 7/7/2024) 15 g 0    nystatin-triamcinolone (MYCOLOG-II) cream Apply topically 2 (two) times a day (Patient not taking: Reported on 7/7/2024) 30 g 1    OLANZapine (ZyPREXA) 5 mg tablet Take 1 tablet (5 mg total) by mouth daily at bedtime Do not take with reglan. 30 tablet 2    onabotulinumtoxin A (BOTOX) 100 units Inject as directed      ondansetron (ZOFRAN) 4 mg tablet Take 1 tablet (4 mg total) by mouth every 6 (six) hours 30 tablet 2    phentermine (ADIPEX-P) 37.5 MG tablet Take 1 tablet (37.5 mg total) by mouth in the morning 30 tablet 3    prazosin (MINIPRESS) 2 mg capsule Take 1 capsule (2 mg total) by mouth daily at bedtime 90 capsule 0    predniSONE 10 mg tablet 4 pills daily for 3 days, 3 for 3 days, 2  "for 3 days, 1 for 3 days. 30 tablet 0    Semaglutide-Weight Management (Wegovy) 0.25 MG/0.5ML Inject 0.25 mg under the skin weekly 2 mL 0    SODIUM FLUORIDE, DENTAL GEL, 1.1 % GEL After brushing thoroughly with toothpaste, rinse as usual. Apply a thin ribbon of gel to the teeth with a toothbrush  once daily for at least one minute, preferably at bedtime.After use, expectorate gel. For best results, do not eat, drink or rinse for 30 minutes. 100 mL 0    Trudhesa 0.725 MG/ACT AERS 1 spray in each nostril for total dose of 1.45mg, may repeat 1 dose after 1 hour. Max 2 doses per 24 hours and 3 doses per week. 12 mL 6     Current Facility-Administered Medications   Medication Dose Route Frequency Provider Last Rate Last Admin    cyanocobalamin injection 1,000 mcg  1,000 mcg Intramuscular Q30 Days Oni Beckham, DO   1,000 mcg at 03/14/24 1450        Allergies   Allergen Reactions    Cimzia [Certolizumab Pegol] Swelling    Desvenlafaxine      Other reaction(s): state of confusion    Ixekizumab Vomiting    Seasonal Ic [Octacosanol] Nasal Congestion    Voltaren [Diclofenac] Swelling    Tizanidine Anxiety       Review of Systems    Video Exam    There were no vitals filed for this visit.    Physical Exam     Behavioral Health Psychotherapy Progress Note    Psychotherapy Provided: Individual Psychotherapy     1. Depression, major, recurrent, moderate (HCC)        2. Generalized anxiety disorder            Goals addressed in session: Goal 1     DATA: Met with Stephanie for her scheduled individual session. She states that she has been having some difficulty with sleeping. She discussed some of the stressors she is experiencing-- e.g., her mother's upcoming biopsy and her grandmother's birthday. We discussed potential ways in which she can celebrate her grandmother's birthday and memory. Stephanie discussed the process of renting out her grandmother's part of the house. Stephanie asked, \"How do I make this process not be emotional?\" We " discussed the fact that renting out her grandmother's home is an emotional process, because it solidifies the fact that her grandmother is gone. Stephanie states that she is trying not to be too involved in the process and is allowing her mother and uncle to be the primary decision-makers. Stephanie discussed a potential friend that she has met at [employer]. She talked about the physical relief that she feels from aquatherapy, and the added excitement of connecting emotionally with people who are experiencing similar symptoms and pain levels. Stephanie discussed her physical health symptoms and her frustration with being able to complete tasks that she wants to do. She states that she has developed itchiness that is unrelenting. This clinician encouraged her to call her primary care physician to follow up. She agreed to do so today. Stephanie states that she talked with the [facility] [place] , and she found out information regarding a potential scholarship for the [facility]. She states that the information was very helpful. Stephanie also reports that she has started to meditating again, and she feels that it is working back to being better able to do it. She showed me her crafting room, and she exhibited excitement to start to do some crafting-- as she is able. Stephanie states that she would like to spend some time working on developing emotion regulation skills.     During this session, this clinician used the following therapeutic modalities: Client-centered Therapy, Dialectical Behavior Therapy, Mindfulness-based Strategies, Motivational Interviewing, Solution-Focused Therapy, and Supportive Psychotherapy    Substance Abuse was not addressed during this session. If the client is diagnosed with a co-occurring substance use disorder, please indicate any changes in the frequency or amount of use: n/a. Stage of change for addressing substance use diagnoses: No substance use/Not applicable    ASSESSMENT:  Stephanie SERGIO Schneider presents with a  "Euthymic/ normal mood.     her affect is Normal range and intensity, which is congruent, with her mood and the content of the session. The client has made progress on their goals.    Stephanie Schneider presents with a minimal risk of suicide, minimal risk of self-harm, and minimal risk of harm to others.    For any risk assessment that surpasses a \"low\" rating, a safety plan must be developed.    A safety plan was indicated: no  If yes, describe in detail n/a    PLAN: Between sessions, Stephanie Schneider will continue to monitor her moods. She will consider options for how she can memorialize and celebrate her grandmother. Stephanie will continue to engage in social activities, as much as she is able. At the next session, the therapist will use Client-centered Therapy, Dialectical Behavior Therapy, Mindfulness-based Strategies, Motivational Interviewing, Solution-Focused Therapy, and Supportive Psychotherapy to address her mood regulation, relationship concerns, and management of grief/loss issues.    Behavioral Health Treatment Plan and Discharge Planning: Stephanie Schneider is aware of and agrees to continue to work on their treatment plan. They have identified and are working toward their discharge goals. yes    Visit start and stop times:    08/02/24  Start Time: 0815  Stop Time: 0908  Total Visit Time: 53 minutes        "

## 2024-08-06 ENCOUNTER — APPOINTMENT (OUTPATIENT)
Dept: PHYSICAL THERAPY | Age: 34
End: 2024-08-06
Payer: COMMERCIAL

## 2024-08-06 DIAGNOSIS — G43.709 CHRONIC MIGRAINE WITHOUT AURA WITHOUT STATUS MIGRAINOSUS, NOT INTRACTABLE: ICD-10-CM

## 2024-08-06 RX ORDER — DIHYDROERGOTAMINE MESYLATE 4 MG/ML
SPRAY, METERED NASAL
Qty: 12 ML | Refills: 0 | Status: SHIPPED | OUTPATIENT
Start: 2024-08-06

## 2024-08-08 ENCOUNTER — OFFICE VISIT (OUTPATIENT)
Dept: FAMILY MEDICINE CLINIC | Facility: CLINIC | Age: 34
End: 2024-08-08
Payer: COMMERCIAL

## 2024-08-08 ENCOUNTER — APPOINTMENT (OUTPATIENT)
Dept: PHYSICAL THERAPY | Age: 34
End: 2024-08-08
Payer: COMMERCIAL

## 2024-08-08 ENCOUNTER — NURSE TRIAGE (OUTPATIENT)
Age: 34
End: 2024-08-08

## 2024-08-08 VITALS
DIASTOLIC BLOOD PRESSURE: 100 MMHG | WEIGHT: 246 LBS | HEART RATE: 90 BPM | HEIGHT: 63 IN | TEMPERATURE: 97.3 F | OXYGEN SATURATION: 98 % | SYSTOLIC BLOOD PRESSURE: 140 MMHG | BODY MASS INDEX: 43.59 KG/M2

## 2024-08-08 DIAGNOSIS — R19.8 ABNORMAL BOWEL HABITS: Primary | ICD-10-CM

## 2024-08-08 DIAGNOSIS — K58.0 IRRITABLE BOWEL SYNDROME WITH DIARRHEA: ICD-10-CM

## 2024-08-08 PROCEDURE — 99213 OFFICE O/P EST LOW 20 MIN: CPT | Performed by: FAMILY MEDICINE

## 2024-08-08 RX ORDER — DICYCLOMINE HYDROCHLORIDE 10 MG/1
20 CAPSULE ORAL
Qty: 120 CAPSULE | Refills: 1 | Status: SHIPPED | OUTPATIENT
Start: 2024-08-08

## 2024-08-08 RX ORDER — PRAMIPEXOLE DIHYDROCHLORIDE 0.25 MG/1
TABLET ORAL
COMMUNITY
Start: 2024-06-17

## 2024-08-08 NOTE — TELEPHONE ENCOUNTER
"Patient has started her steroids, Bentyl and the lomotil  Reason for Disposition   Patient wants to be seen    Answer Assessment - Initial Assessment Questions  1. DIARRHEA SEVERITY: \"How bad is the diarrhea?\" \"How many extra stools have you had in the past 24 hours than normal?\"     - NO DIARRHEA (SCALE 0)    - MILD (SCALE 1-3): Few loose or mushy BMs; increase of 1-3 stools over normal daily number of stools; mild increase in ostomy output.    -  MODERATE (SCALE 4-7): Increase of 4-6 stools daily over normal; moderate increase in ostomy output.  * SEVERE (SCALE 8-10; OR 'WORST POSSIBLE'): Increase of 7 or more stools daily over normal; moderate increase in ostomy output; incontinence.      mild  2. ONSET: \"When did the diarrhea begin?\"       24 hours ago  3. BM CONSISTENCY: \"How loose or watery is the diarrhea?\"       loose  4. VOMITING: \"Are you also vomiting?\" If Yes, ask: \"How many times in the past 24 hours?\"       N/A  5. ABDOMINAL PAIN: \"Are you having any abdominal pain?\" If Yes, ask: \"What does it feel like?\" (e.g., crampy, dull, intermittent, constant)       yes  6. ABDOMINAL PAIN SEVERITY: If present, ask: \"How bad is the pain?\"  (e.g., Scale 1-10; mild, moderate, or severe)    - MILD (1-3): doesn't interfere with normal activities, abdomen soft and not tender to touch     - MODERATE (4-7): interferes with normal activities or awakens from sleep, tender to touch     - SEVERE (8-10): excruciating pain, doubled over, unable to do any normal activities        Moderate to severe-cramping  pain  7. ORAL INTAKE: If vomiting, \"Have you been able to drink liquids?\" \"How much fluids have you had in the past 24 hours?\"      1.5 glasses  8. HYDRATION: \"Any signs of dehydration?\" (e.g., dry mouth [not just dry lips], too weak to stand, dizziness, new weight loss) \"When did you last urinate?\"      Weak and shaky  9. EXPOSURE: \"Have you traveled to a foreign country recently?\" \"Have you been exposed to anyone with " "diarrhea?\" \"Could you have eaten any food that was spoiled?\"      denies  10. ANTIBIOTIC USE: \"Are you taking antibiotics now or have you taken antibiotics in the past 2 months?\"        N/A  11. OTHER SYMPTOMS: \"Do you have any other symptoms?\" (e.g., fever, blood in stool)        Nausea, abdominal pain  12. PREGNANCY: \"Is there any chance you are pregnant?\" \"When was your last menstrual period?\"        N/A    Protocols used: Diarrhea-ADULT-[place]    "

## 2024-08-09 ENCOUNTER — TELEPHONE (OUTPATIENT)
Dept: NEUROLOGY | Facility: CLINIC | Age: 34
End: 2024-08-09

## 2024-08-09 NOTE — PROGRESS NOTES
Assessment/Plan:    35 y/o woman with: abnormal bowel habits with IBS. Discussed workup and treatment options. Will check stool studies and refer to GI. Discussed supportive care and return parameters.     No problem-specific Assessment & Plan notes found for this encounter.       Diagnoses and all orders for this visit:    Abnormal bowel habits  -     Ambulatory Referral to Gastroenterology; Future  -     Calprotectin,Fecal; Future  -     Occult blood 1-3, stool; Future  -     Ova and parasite examination; Future  -     H. pylori antigen, stool; Future  -     Clostridium difficile toxin by PCR with EIA; Future  -     Stool culture; Future    Irritable bowel syndrome with diarrhea  -     dicyclomine (BENTYL) 10 mg capsule; Take 2 capsules (20 mg total) by mouth 3 (three) times a day before meals    Other orders  -     pramipexole (MIRAPEX) 0.25 mg tablet          Subjective:     Chief Complaint   Patient presents with    Irritable Bowel Syndrome     Patient states she is in the middle of IBS flare up which started yesterday. Patient states she is nauseous and has been experiencing cramping. Patient states she started Bentyl, lomotil, and steroid yesterday and has not had any bowel movements today. No further concerns, ng        Patient ID: Cruz Schneider is a 34 y.o. female.    Patient is a 35 y/o woman who presents c/o an IBS flare with diarrhea alternating with constipation no fevers chills nausea or vomiting.        The following portions of the patient's history were reviewed and updated as appropriate: allergies, current medications, past family history, past medical history, past social history, past surgical history and problem list.    Review of Systems   Constitutional: Negative.    HENT: Negative.     Eyes: Negative.    Respiratory: Negative.     Cardiovascular: Negative.    Gastrointestinal:  Positive for abdominal pain, constipation and diarrhea.   Endocrine: Negative.    Genitourinary: Negative.   "  Musculoskeletal: Negative.    Allergic/Immunologic: Negative.    Neurological: Negative.    Hematological: Negative.    Psychiatric/Behavioral: Negative.     All other systems reviewed and are negative.        Objective:      /100 (BP Location: Left arm, Patient Position: Sitting, Cuff Size: Large)   Pulse 90   Temp (!) 97.3 °F (36.3 °C) (Temporal)   Ht 5' 3\" (1.6 m)   Wt 112 kg (246 lb)   SpO2 98%   BMI 43.58 kg/m²          Physical Exam  Constitutional:       Appearance: She is well-developed.   HENT:      Head: Atraumatic.      Right Ear: External ear normal.      Left Ear: External ear normal.   Eyes:      Extraocular Movements: EOM normal.      Conjunctiva/sclera: Conjunctivae normal.      Pupils: Pupils are equal, round, and reactive to light.   Cardiovascular:      Rate and Rhythm: Normal rate and regular rhythm.      Heart sounds: Normal heart sounds.   Pulmonary:      Effort: Pulmonary effort is normal. No respiratory distress.      Breath sounds: Normal breath sounds.   Abdominal:      General: There is no distension.      Palpations: Abdomen is soft.      Tenderness: There is no abdominal tenderness. There is no guarding or rebound.   Musculoskeletal:         General: Normal range of motion.      Cervical back: Normal range of motion.   Skin:     General: Skin is warm and dry.   Neurological:      Mental Status: She is alert and oriented to person, place, and time.      Cranial Nerves: No cranial nerve deficit.   Psychiatric:         Mood and Affect: Mood and affect normal.         Behavior: Behavior normal.         Thought Content: Thought content normal.         Judgment: Judgment normal.         "

## 2024-08-09 NOTE — TELEPHONE ENCOUNTER
Botox number of units: 200  Botox quantity: 1  Arrived at what location: [facility]  Botox at Correct Administering Location: YES  NDC number:[id number]  Lot number:[id number]  Expiration Date: 2026/12  Appt notes indicate correct medication:  YES

## 2024-08-13 ENCOUNTER — TELEPHONE (OUTPATIENT)
Dept: FAMILY MEDICINE CLINIC | Facility: CLINIC | Age: 34
End: 2024-08-13

## 2024-08-13 ENCOUNTER — PROCEDURE VISIT (OUTPATIENT)
Dept: NEUROLOGY | Facility: CLINIC | Age: 34
End: 2024-08-13
Payer: COMMERCIAL

## 2024-08-13 VITALS — DIASTOLIC BLOOD PRESSURE: 91 MMHG | HEART RATE: 85 BPM | SYSTOLIC BLOOD PRESSURE: 121 MMHG | TEMPERATURE: 97.3 F

## 2024-08-13 DIAGNOSIS — H92.02 LEFT EAR PAIN: Primary | ICD-10-CM

## 2024-08-13 DIAGNOSIS — G43.709 CHRONIC MIGRAINE WITHOUT AURA WITHOUT STATUS MIGRAINOSUS, NOT INTRACTABLE: Primary | ICD-10-CM

## 2024-08-13 PROCEDURE — 64615 CHEMODENERV MUSC MIGRAINE: CPT | Performed by: PHYSICIAN ASSISTANT

## 2024-08-13 NOTE — PROGRESS NOTES
Universal Protocol   Consent: Verbal consent obtained. Written consent obtained.  Risks and benefits: risks, benefits and alternatives were discussed  Consent given by: patient  Patient understanding: patient states understanding of the procedure being performed  Patient consent: the patient's understanding of the procedure matches consent given  Procedure consent: procedure consent matches procedure scheduled      Chemodenervation     Date/Time  8/13/2024 8:00 AM     Performed by  Jacki Perez PA-C   Authorized by  Jacki Perez PA-C     Pre-procedure details      Prepped With: Alcohol     Procedure details      Position:  Upright   Botox      Botox Type:  Type A    Brand:  Botox    mL's of Botulinum Toxin:  200    Final Concentration per CC:  100 units    Needle Gauge:  30 G 2.5 inch   Procedures      Botox Procedures: chronic headache      Indications: migraines     Injection Location      Head / Face:  L superior trapezius, R superior trapezius, L superior cervical paraspinal, R superior cervical paraspinal, L , R , procerus, L temporalis, R temporalis, R frontalis, L frontalis, R medial occipitalis and L medial occipitalis    L  injection amount:  5 unit(s)    R  injection amount:  5 unit(s)    L lateral frontalis:  5 unit(s)    R lateral frontalis:  5 unit(s)    L medial frontalis:  5 unit(s)    R medial frontalis:  5 unit(s)    L temporalis injection amount:  20 unit(s)    R temporalis injection amount:  20 unit(s)    Procerus injection amount:  5 unit(s)    L medial occipitalis injection amount:  15 unit(s)    R medial occipitalis injection amount:  15 unit(s)    L superior cervical paraspinal injection amount:  10 unit(s)    R superior cervical paraspinal injection amount:  10 unit(s)    L superior trapezius injection amount:  0 unit(s)    R superior trapezius injection amount:  0 unit(s)   Total Units      Total units used:  200    Total units discarded:  0    Post-procedure details      Chemodenervation:  Chronic migraine    Facial Nerve Location::  Bilateral facial nerve    Patient tolerance of procedure:  Tolerated well, no immediate complications   Comments       Medically necessary:  - 30 units between the R and L headband region  - 35 units between each anterior temporalis and scalp  - 5 each masseter (10 total)  Avoided traps b/l.  Cold spray used.       Blood pressure 121/91, pulse 85, temperature (!) 97.3 °F (36.3 °C), temperature source Temporal, not currently breastfeeding.  Pt will monitor her balance until I next see her, consider getting visual field testing from ophthalmology.

## 2024-08-14 ENCOUNTER — TELEMEDICINE (OUTPATIENT)
Dept: PSYCHIATRY | Facility: CLINIC | Age: 34
End: 2024-08-14
Payer: COMMERCIAL

## 2024-08-14 DIAGNOSIS — F41.1 GENERALIZED ANXIETY DISORDER: Chronic | ICD-10-CM

## 2024-08-14 DIAGNOSIS — F33.1 DEPRESSION, MAJOR, RECURRENT, MODERATE (HCC): Primary | Chronic | ICD-10-CM

## 2024-08-14 DIAGNOSIS — Z79.899 LONG TERM CURRENT USE OF ANTIPSYCHOTIC MEDICATION: ICD-10-CM

## 2024-08-14 DIAGNOSIS — F41.0 PANIC DISORDER WITHOUT AGORAPHOBIA: ICD-10-CM

## 2024-08-14 DIAGNOSIS — G43.709 CHRONIC MIGRAINE WITHOUT AURA WITHOUT STATUS MIGRAINOSUS, NOT INTRACTABLE: ICD-10-CM

## 2024-08-14 PROBLEM — Z32.00 POSSIBLE PREGNANCY: Status: RESOLVED | Noted: 2018-07-26 | Resolved: 2024-08-14

## 2024-08-14 PROCEDURE — 99214 OFFICE O/P EST MOD 30 MIN: CPT | Performed by: PSYCHIATRY & NEUROLOGY

## 2024-08-14 PROCEDURE — 90833 PSYTX W PT W E/M 30 MIN: CPT | Performed by: PSYCHIATRY & NEUROLOGY

## 2024-08-14 RX ORDER — BUPROPION HYDROCHLORIDE 300 MG/1
300 TABLET ORAL DAILY
Qty: 90 TABLET | Refills: 0 | Status: SHIPPED | OUTPATIENT
Start: 2024-08-14

## 2024-08-14 RX ORDER — OLANZAPINE 7.5 MG/1
7.5 TABLET, FILM COATED ORAL
Qty: 90 TABLET | Refills: 0 | Status: SHIPPED | OUTPATIENT
Start: 2024-08-14

## 2024-08-14 RX ORDER — ESCITALOPRAM OXALATE 20 MG/1
20 TABLET ORAL DAILY
Qty: 90 TABLET | Refills: 0 | Status: SHIPPED | OUTPATIENT
Start: 2024-08-14

## 2024-08-14 RX ORDER — ALPRAZOLAM 0.5 MG
0.5 TABLET ORAL 3 TIMES DAILY PRN
Qty: 90 TABLET | Refills: 2 | Status: SHIPPED | OUTPATIENT
Start: 2024-08-14

## 2024-08-14 NOTE — PSYCH
Virtual Regular Visit    Verification of patient location:    Patient is located at Home in the following state in which I hold an active license [place]      Assessment/Plan:    Problem List Items Addressed This Visit          Cardiovascular and Mediastinum    Chronic migraine without aura without status migrainosus, not intractable    Relevant Medications    OLANZapine (ZyPREXA) 7.5 mg tablet    escitalopram (LEXAPRO) 20 mg tablet    buPROPion (WELLBUTRIN XL) 300 mg 24 hr tablet       Behavioral Health    Depression, major, recurrent, moderate (HCC) - Primary (Chronic)    Relevant Medications    OLANZapine (ZyPREXA) 7.5 mg tablet    ALPRAZolam (XANAX) 0.5 mg tablet    escitalopram (LEXAPRO) 20 mg tablet    buPROPion (WELLBUTRIN XL) 300 mg 24 hr tablet    Generalized anxiety disorder (Chronic)    Relevant Medications    OLANZapine (ZyPREXA) 7.5 mg tablet    ALPRAZolam (XANAX) 0.5 mg tablet    escitalopram (LEXAPRO) 20 mg tablet    buPROPion (WELLBUTRIN XL) 300 mg 24 hr tablet    Panic disorder without agoraphobia    Relevant Medications    OLANZapine (ZyPREXA) 7.5 mg tablet    ALPRAZolam (XANAX) 0.5 mg tablet    escitalopram (LEXAPRO) 20 mg tablet    buPROPion (WELLBUTRIN XL) 300 mg 24 hr tablet     Other Visit Diagnoses       Long term current use of antipsychotic medication        Relevant Orders    Lipid panel    Hemoglobin A1C                        Reason for visit is   Medication Management     Encounter provider Aliyah Keating MD    Provider located at [facility] [place] [address] [phone]      Recent Visits  No visits were found meeting these conditions.  Showing recent visits within past 7 days and meeting all other requirements  Today's Visits  Date Type Provider Dept   08/14/24 [facility]medicine [facility]   Showing today's visits and meeting all other  requirements  Future Appointments  No visits were found meeting these conditions.  Showing future appointments within next 150 days and meeting all other requirements       The patient was identified by name and date of birth. Cruz Schneider was informed that this is a telemedicine visit and that the visit is being conducted throughthe Epic Embedded platform. She agrees to proceed..  My office door was closed. No one else was in the room.  She acknowledged consent and understanding of privacy and security of the video platform. The patient has agreed to participate and understands they can discontinue the visit at any time.    Patient is aware this is a billable service.     Subjective  Cruz Schneider is a 34 y.o. female with MDD,Panic do, BESSY .Stephanie remains compliant with her medications and denies side effects. No recent health changes or new medications.  She continues to meet with her counselor on a regular basis.      Patient stated she has been dealing with a lot of physical pain due to ankylosing spondylitis and plantar fasciitis.   She is also taking medical cannabis which helps with her anxiety and also with her chronic pain.  Since last seen her anxiety has been high and had Alprazolam increased to 0.5 mg po tid temporarily.   She has benefited from dose increase but still struggles with depressed mood. She explained that recent fallout with best friend has been a contributing factor.   Agrees to try dose increase on Olanzapine to 7.5 mg po qhs. Lipids and A1C ordered.   She is had trial of Phentermine for weight loss but experienced side effcts. She is considering trial of Wegovy instead.  Agrees with above stated treatment and will schedule follow up in 3 months or sooner of needed.       Allergies   Allergen Reactions    Cimzia [Certolizumab Pegol] Swelling    Desvenlafaxine      Other reaction(s): state of confusion    Ixekizumab Vomiting    Seasonal Ic [Octacosanol] Nasal Congestion     Tizanidine Anxiety       Review of Systems    Mood Anxiety and Depression   Behavior Normal    Thought Content Disturbing Thoughts, Feelings   General Emotional Problems and Decreased Functioning   Personality Normal   Other Psych Symptoms Normal   Constitutional Negative   ENT Negative   Cardiovascular Negative   Respiratory Negative   Gastrointestinal Negative   Genitourinary Negative   Musculoskeletal Negative   Integumentary Negative   Neurological Negative   Endocrine Normal    Other Symptoms Normal        Laboratory Results:   Recent Labs (last 12 months):   Admission on 07/07/2024, Discharged on 07/07/2024   Component Date Value    WBC 07/07/2024 7.49     RBC 07/07/2024 4.28     Hemoglobin 07/07/2024 12.3     Hematocrit 07/07/2024 37.7     MCV 07/07/2024 88     MCH 07/07/2024 28.7     MCHC 07/07/2024 32.6     RDW 07/07/2024 12.8     MPV 07/07/2024 8.9     Platelets 07/07/2024 450 (H)     nRBC 07/07/2024 0     Segmented % 07/07/2024 51     Immature Grans % 07/07/2024 0     Lymphocytes % 07/07/2024 38     Monocytes % 07/07/2024 8     Eosinophils Relative 07/07/2024 3     Basophils Relative 07/07/2024 0     Absolute Neutrophils 07/07/2024 3.83     Absolute Immature Grans 07/07/2024 0.01     Absolute Lymphocytes 07/07/2024 2.85     Absolute Monocytes 07/07/2024 0.58     Eosinophils Absolute 07/07/2024 0.19     Basophils Absolute 07/07/2024 0.03     Sodium 07/07/2024 136     Potassium 07/07/2024 3.7     Chloride 07/07/2024 104     CO2 07/07/2024 22     ANION GAP 07/07/2024 10     BUN 07/07/2024 5     Creatinine 07/07/2024 0.64     Glucose 07/07/2024 88     Calcium 07/07/2024 9.1     AST 07/07/2024 23     ALT 07/07/2024 18     Alkaline Phosphatase 07/07/2024 59     Total Protein 07/07/2024 7.1     Albumin 07/07/2024 3.7     Total Bilirubin 07/07/2024 0.46     eGFR 07/07/2024 117     Lipase 07/07/2024 16     EXT Preg Test, Ur 07/07/2024 Negative     Control 07/07/2024 Valid     Color, UA 07/07/2024 Yellow      Clarity, UA 07/07/2024 Clear     Specific Gravity, UA 07/07/2024 1.025     pH, UA 07/07/2024 6.5     Leukocytes, UA 07/07/2024 Negative     Nitrite, UA 07/07/2024 Negative     Protein, UA 07/07/2024 Negative     Glucose, UA 07/07/2024 Negative     Ketones, UA 07/07/2024 Trace (A)     Urobilinogen, UA 07/07/2024 0.2     Bilirubin, UA 07/07/2024 Negative     Occult Blood, UA 07/07/2024 Negative    Appointment on 05/29/2024   Component Date Value    RODRIGUEZ SYNDROME DNA FRANCISCO J* 05/29/2024 Not Detected     HEREDITARY BREAST & OVAR* 05/29/2024 Not Detected     FAMILIAL HYPERCHOLESTERO* 05/29/2024 Not Detected    Office Visit on 03/01/2024   Component Date Value    Color, UA 03/01/2024 Yellow     Clarity, UA 03/01/2024 Clear     Specific Gravity, UA 03/01/2024 1.020     pH, UA 03/01/2024 6.5     Leukocytes, UA 03/01/2024 Moderate (A)     Nitrite, UA 03/01/2024 Negative     Protein, UA 03/01/2024 Trace (A)     Glucose, UA 03/01/2024 Negative     Ketones, UA 03/01/2024 Negative     Urobilinogen, UA 03/01/2024 <2.0     Bilirubin, UA 03/01/2024 Negative     Occult Blood, UA 03/01/2024 Negative     Urine Culture 03/01/2024 10,000-19,000 cfu/ml     RBC, UA 03/01/2024 1-2     WBC, UA 03/01/2024 10-20 (A)     Epithelial Cells 03/01/2024 Moderate (A)     Bacteria, UA 03/01/2024 None Seen     MUCUS THREADS 03/01/2024 Innumerable (A)    Office Visit on 02/26/2024   Component Date Value    SARS-CoV-2 02/26/2024 Negative     INFLUENZA A PCR 02/26/2024 Negative     INFLUENZA B PCR 02/26/2024 Negative    Office Visit on 02/01/2024   Component Date Value     RAPID STREP A 02/01/2024 Negative     Throat Culture 02/01/2024 Negative for beta-hemolytic Streptococcus    Admission on 01/28/2024, Discharged on 01/28/2024   Component Date Value    SARS-CoV-2 01/28/2024 Positive (A)     INFLUENZA A PCR 01/28/2024 Negative     INFLUENZA B PCR 01/28/2024 Negative     RSV PCR 01/28/2024 Negative    Appointment on 12/30/2023   Component Date Value     TSH 3RD GENERATON 12/30/2023 1.163     CRP 12/30/2023 7.9 (H)    Admission on 12/24/2023, Discharged on 12/24/2023   Component Date Value    WBC 12/24/2023 6.89     RBC 12/24/2023 4.18     Hemoglobin 12/24/2023 12.5     Hematocrit 12/24/2023 37.5     MCV 12/24/2023 90     MCH 12/24/2023 29.9     MCHC 12/24/2023 33.3     RDW 12/24/2023 13.8     MPV 12/24/2023 9.1     Platelets 12/24/2023 357     nRBC 12/24/2023 0     Segmented % 12/24/2023 44     Immature Grans % 12/24/2023 0     Lymphocytes % 12/24/2023 46 (H)     Monocytes % 12/24/2023 7     Eosinophils Relative 12/24/2023 3     Basophils Relative 12/24/2023 0     Absolute Neutrophils 12/24/2023 3.05     Absolute Immature Grans 12/24/2023 0.01     Absolute Lymphocytes 12/24/2023 3.16     Absolute Monocytes 12/24/2023 0.45     Eosinophils Absolute 12/24/2023 0.20     Basophils Absolute 12/24/2023 0.02     Sodium 12/24/2023 136     Potassium 12/24/2023 3.8     Chloride 12/24/2023 104     CO2 12/24/2023 22     ANION GAP 12/24/2023 10     BUN 12/24/2023 7     Creatinine 12/24/2023 0.65     Glucose 12/24/2023 103     Calcium 12/24/2023 8.9     eGFR 12/24/2023 117     Lipase 12/24/2023 18     Color, UA 12/24/2023 Yellow     Clarity, UA 12/24/2023 Slightly Cloudy (A)     Specific Gravity, UA 12/24/2023 >=1.030 (H)     pH, UA 12/24/2023 6.0     Leukocytes, UA 12/24/2023 Trace (A)     Nitrite, UA 12/24/2023 Negative     Protein, UA 12/24/2023 Trace (A)     Glucose, UA 12/24/2023 Negative     Ketones, UA 12/24/2023 Trace (A)     Urobilinogen, UA 12/24/2023 0.2     Bilirubin, UA 12/24/2023 1+ (A)     Occult Blood, UA 12/24/2023 Negative     EXT Preg Test, Ur 12/24/2023 Negative     Control 12/24/2023 Valid     RBC, UA 12/24/2023 1-2     WBC, UA 12/24/2023 10-20 (A)     Epithelial Cells 12/24/2023 Moderate (A)     Bacteria, UA 12/24/2023 Occasional     MUCUS THREADS 12/24/2023 Moderate (A)     Urine Culture 12/24/2023 40,000-49,000 cfu/ml    Admission on 11/21/2023, Discharged  on 11/21/2023   Component Date Value    EXT Preg Test, Ur 11/21/2023 Negative     Control 11/21/2023 Valid     SARS-CoV-2 11/21/2023 Negative     INFLUENZA A PCR 11/21/2023 Negative     INFLUENZA B PCR 11/21/2023 Negative     RSV PCR 11/21/2023 Negative    Annual Exam on 11/14/2023   Component Date Value    Case Report 11/14/2023                      Value:Gynecologic Cytology Report                       Case: [id number]                                  Authorizing Provider:  Ignacia López MD  Collected:           11/14/2023 1519              Ordering Location:     [facility]  Received:            11/14/2023 1519                                     [facility]                                                           First Screen:          Leela Finn CT                                                       Rescreen:              Hien Santacruz                                                                  Specimen:    LIQUID-BASED PAP, SCREENING, Cervix, Endocervical                                          Primary Interpretation 11/14/2023 Negative for intraepithelial lesion or malignancy     Specimen Adequacy 11/14/2023 Satisfactory for evaluation. Endocervical/transformation zone component present.     Additional Information 11/14/2023                      Value:The [employer]'s FDA approved ,  and ThinPrep Imaging Duo System are                           utilized with strict adherence to the 's instruction manual to                           prepare gynecologic and non-gynecologic cytology specimens for the                           production of ThinPrep slides as well as for gynecologic ThinPrep imaging.                           These processes have been validated by our laboratory and/or by the                           .                          The Pap test is not a diagnostic procedure and should not be used as the                            sole means to detect cervical cancer. It is only a screening procedure to                           aid in the detection of cervical cancer and its precursors. Both                           false-negative and false-positive results have been experienced. Your                           patient's test result should be interpreted in this context together with                           the history and clinical findings.    Trichomonas vaginalis 11/14/2023 Not Detected     Gardnerella vaginalis 11/14/2023 Not Detected     Candida Species 11/14/2023 Not Detected     HPV Other HR 11/14/2023 Negative     HPV16 11/14/2023 Negative     HPV18 11/14/2023 Negative    There may be more visits with results that are not included.     Substance Abuse History:  Social History     Substance and Sexual Activity   Drug Use Yes    Frequency: 2.0 times per week    Types: Marijuana    Comment: medical marijuana (vape)       Family Psychiatric History:   Family History   Problem Relation Age of Onset    Rheum arthritis Mother     Psoriasis Mother     Other Mother     Hypertension Mother     Diabetes unspecified Mother     Sjogren's syndrome Mother     Alcohol abuse Mother     Drug abuse Mother     Anxiety disorder Father     Alcohol abuse Father     Lung cancer Maternal Grandfather     Cancer Other         bone    Diabetes Other     Other Other         High blood pressure    Depression Maternal Grandmother        The following portions of the patient's history were reviewed and updated as appropriate: allergies, current medications, past family history, past medical history, past social history, past surgical history and problem list.    Social History     Socioeconomic History    Marital status: Single     Spouse name: Not on file    Number of children: 0    Years of education: 12    Highest education level: Not on file   Occupational History    Occupation: Unemployed     Employer: [employer]   Tobacco Use    Smoking status: Never      Passive exposure: Past    Smokeless tobacco: Never   Vaping Use    Vaping status: Never Used   Substance and Sexual Activity    Alcohol use: Not Currently     Comment: rarely    Drug use: Yes     Frequency: 2.0 times per week     Types: Marijuana     Comment: medical marijuana (vape)    Sexual activity: Not Currently     Partners: Male     Comment: Nuva ring   Other Topics Concern    Not on file   Social History Narrative    Home:  Living with mom and MGM        Education:    Pt denies any h/o learning disability Dxs but admits to having great difficulty in math requiring a .  She reached childhood milestones on time as far as he knows.    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 On reflection, she feels it was moreso her anxiety as the cause of her leaving school, and she was using the other reason as an excuse so she would not have to admit to anxiety at that time.  She is now very open about her anxiety and does not mind that I have this information in the general social section of her chart.     Social Determinants of Health     Financial Resource Strain: Low Risk  (8/16/2022)    Overall Financial Resource Strain (CARDIA)     Difficulty of Paying Living Expenses: Not hard at all   Food Insecurity: No Food Insecurity (8/16/2022)    Hunger Vital Sign     Worried About Running Out of Food in the Last Year: Never true     Ran Out of Food in the Last Year: Never true   Transportation Needs: No Transportation Needs (8/16/2022)    PRAPARE - Transportation     Lack of Transportation (Medical): No     Lack of Transportation (Non-Medical): No   Physical Activity: Unknown (2/11/2021)    Exercise Vital Sign     Days of Exercise per Week: 0 days     Minutes of Exercise per Session: Not on file   Stress: Not  on file   Social Connections: Not on file   Intimate Partner Violence: Not on file   Housing Stability: Not on file     Social History     Social History Narrative    Home:  Living with mom and MGM        Education:    Pt denies any h/o learning disability Dxs but admits to having great difficulty in math requiring a .  She reached childhood milestones on time as far as he knows.    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 On reflection, she feels it was moreso her anxiety as the cause of her leaving school, and she was using the other reason as an excuse so she would not have to admit to anxiety at that time.  She is now very open about her anxiety and does not mind that I have this information in the general social section of her chart.       Objective:       Mental status:  Appearance calm and cooperative , adequate hygiene and grooming and good eye contact    Mood dysphoric   Affect affect was constricted   Speech a normal rate and fluent   Thought Processes coherent/organized and normal thought processes   Hallucinations no hallucinations present    Thought Content no delusions   Abnormal Thoughts no suicidal thoughts  and no homicidal thoughts    Orientation  oriented to person and place and time   Remote Memory short term memory intact and long term memory intact   Attention Span concentration intact   Intellect Appears to be of Average Intelligence   Insight Limited insight   Judgement judgment was limited   Muscle Strength n/a   Language no difficulty naming common objects and no difficulty repeating a phrase    Fund of Knowledge displays adequate knowledge of current events, adequate fund of knowledge regarding past history and adequate fund of knowledge regarding vocabulary                 Assessment/Plan:       Diagnoses and all orders for this visit:    Depression, major, recurrent, moderate (HCC)    Generalized anxiety disorder  -     ALPRAZolam (XANAX) 0.5 mg tablet; Take 1 tablet (0.5 mg total) by mouth 3 (three) times a day as needed for anxiety  -     escitalopram (LEXAPRO) 20 mg tablet; Take 1 tablet (20 mg total) by mouth daily  -     buPROPion (WELLBUTRIN XL) 300 mg 24 hr tablet; Take 1 tablet (300 mg total) by mouth daily    Panic disorder without agoraphobia    Chronic migraine without aura without status migrainosus, not intractable  -     OLANZapine (ZyPREXA) 7.5 mg tablet; Take 1 tablet (7.5 mg total) by mouth daily at bedtime Do not take with reglan.    Long term current use of antipsychotic medication  -     Lipid panel; Future  -     Hemoglobin A1C; Future                  Treatment Recommendations- Risks Benefits      Immediate Medical/Psychiatric/Psychotherapy Treatments and Any Precautions: as stated on HPI     Risks, Benefits And Possible Side Effects Of Medications:  {PSYCH RISK, BENEFITS AND POSSIBLE SIDE EFFECTS (Optional):82706    Controlled Medication Discussion: Discussed with patient Black Box warning on concurrent use of benzodiazepines and opioid medications including sedation, respiratory depression, coma and death. Patient understands the risk of treatment with benzodiazepines in addition to opioids and wants to continue taking those medications. , Discussed with patient the risks of sedation, respiratory depression, impairment of ability to drive and potential for abuse and addiction related to treatment with benzodiazepine medications. The patient understands risk of treatment with benzodiazepine medications, agrees to not drive if feels impaired and agrees to take medications as prescribed. and The patient has been filling controlled prescriptions on time as prescribed to [place] Prescription Drug Monitoring program.      Psychotherapy Provided:  Individual psychotherapy provided.     Individual psychotherapy provided: Yes  Counseling was provided during the session today for 16 minutes.  Medications, treatment progress and treatment plan reviewed with Cruz.  Medication education provided to Cruz.  Goals discussed during in session: improve control of anxiety and improve control of depression.   Recent stressor including relationship problems, health issues, medical problems, limited support, social difficulties, everyday stressors, ongoing anxiety and chronic mental illness discussed with Cruz.   Coping strategies including compliance with medications, contacting a therapist, deep/slow breathing, eliminating avoidance, engaging in previously avoided activities, exercising, getting into a good routine, improving self-esteem, increasing energy, increasing interest in usual activities, increasing motivation, increasing social interaction, keeping busy at home, maintain healthy diet, maintain heathy sleeping hygiene and maintain positive attitude reviewed with Cruz.   Importance of medication and treatment compliance reviewed with Cruz.  Educated on importance of medication and treatment compliance.  Importance of follow up with family physician for medical issues reviewed with Cruz.  Discussed with Cruz acceptance of mental illness diagnosis and need for ongoing psychiatric treatment.  Supportive therapy provided.                  Visit Time    Visit Start Time: 10:30  Visit Stop Time: 11:00  Total Visit Duration:  30 minutes

## 2024-08-15 ENCOUNTER — OFFICE VISIT (OUTPATIENT)
Dept: PHYSICAL THERAPY | Age: 34
End: 2024-08-15
Payer: COMMERCIAL

## 2024-08-15 ENCOUNTER — TELEPHONE (OUTPATIENT)
Age: 34
End: 2024-08-15

## 2024-08-15 DIAGNOSIS — M54.50 CHRONIC LOW BACK PAIN, UNSPECIFIED BACK PAIN LATERALITY, UNSPECIFIED WHETHER SCIATICA PRESENT: Primary | ICD-10-CM

## 2024-08-15 DIAGNOSIS — G89.29 CHRONIC LOW BACK PAIN, UNSPECIFIED BACK PAIN LATERALITY, UNSPECIFIED WHETHER SCIATICA PRESENT: Primary | ICD-10-CM

## 2024-08-15 PROCEDURE — 97113 AQUATIC THERAPY/EXERCISES: CPT

## 2024-08-15 NOTE — PROGRESS NOTES
"Daily Note     Today's date: 8/15/2024  Patient name: Cruz Schneider  : 1990  MRN: [id number]  Referring provider: Oni Beckham DO  Dx:   Encounter Diagnosis     ICD-10-CM    1. Chronic low back pain, unspecified back pain laterality, unspecified whether sciatica present  M54.50     G89.29                        Subjective: Patient reported bilateral rib region pain persists  3/10 today \"I am also getting pain in the front of my right groin since yesterday\"    Objective: See treatment diary below 1:1 Dylan Verma DPT       Assessment: Fair tolerance to aqua based exercises. Seated breaks due to Fatigue and pain.       Plan: Continue per plan of care.  Progress treatment as tolerated.       Access Code: [email or web address]  URL: [email or web address]  Date: 2024  Prepared by: Benjamin Machuca    Exercises  - Seated Cervical Retraction  - 3-4 x daily - 7 x weekly - 2 sets - 5 reps  - Lying Prone  - 2-3 x daily - 7 x weekly - 1 sets - 1 reps - 2-5 minutes hold  - Prone Press Up  - 2-3 x daily - 7 x weekly - 2 sets - 5 reps      Precautions: Patient's PMHx is remarkable for AS, Migraine, HTN, GERD, IBS, Myofascial Muscle Pain, Restless leg syndrome, Vitamin B 12 and D Deficiency.    Manuals 7/8/24 7/9 7/16 7/18 7/23 7/25 7/30  8/1 8/15                                                        Neuro Re-Ed                                       HEP: cervical retraction 5 x              HEP lumbar extension  via prone press ups 5 x 2                                                   Ther Ex                          Water walking pre and post  5x  5x  5x x5 X 5  x5 5 x  5x    Standing lumbar spine extension against counter  20x  20x  20x 20x X 20   2 x 10 new hep! 20x                  Seated hamstring stretch:B:  20sec 5x  20sec 5x  20\" 5x  20\" x 5  20\"x5 20 sec x 5 20sec 5x     LAQ:B:  20x  20x  20x 20x X 20  x20 2 x 10 20x     Hip flexion:B:  20x  20x  20x  X 20  x20 2 x 10 20x     Seated postural correction " "slough over correct  20x  20x  20x 20x X 20  x20 2 x 10 20x     Cervical spine retraction  20sec 5x  20sec 5x  20\"x5   20 \" x 5  20\"x5 2 x 10 20x     UT stretch:B:  20sec 5x  20sec 5x  20\"x5 20\"x5 20\" x 5  20\"x5 20 sec x 5 20sec 5x     LS stretch:B:  NT NT     20 sec x 5 20sec 5x                  Standing scapular squeezes  20X 2SEC  20X 3SEC  20x 3\" 20\"x3 20\" x 3 20\"x3 3 sec x 20 20x 3sec     Standing abdominal contraction with ball push down  20X  20X  20x  X 20  x20 NT NT    Standing slr x 3:B:  20X  20X  20x 20x X 20  x20 2 x 10 20X     Standing hr and tr:B:  20X  20X  20x 20x X 20  x20 2 x 10 20X     Mini squats  20X  20X  20x  20x X 20  x20 2 x 10 20X      SLS and tandem stance:B:  NT NT          Side stepping and tandem ambulation  6X  6X 6x 6x X 6  X 6 6 x  6X     Forward step ups:B:  NT NT      NT                 Paddles rows, horizontal add / abd and shoulder add / abd  NT NT      NT    Pool pony bicycle  NT 5 MIN  5 MIN 5 min X 5 min  X5 min 5 min 5 MIN                  Pool pony hang  NT 5 MIN  5 MIN 5 min  X 5 min  X5 min 5 min 5 MIN                  Q/L st B   20sec 3x  NT 20sec x3 [id number] 20 sec x 3 20 sec x3 20 sec x 3 20SEC 5X                                              Ther Activity                                       Gait Training                                       Modalities                                                  "

## 2024-08-15 NOTE — TELEPHONE ENCOUNTER
Patient called in to schedule a follow up . Patient was last seen back in December with  then was seen at [facility] . Patient requested to see a any rheum doctor at the [facility] . BLAISE

## 2024-08-15 NOTE — TELEPHONE ENCOUNTER
PA for Wegovy 0.25MG/0.5ML SUBMITTED     via    [x]CMM-KEY: [id number]   []Surescripts-Case ID #   []Faxed to plan   []Other website   []Phone call Case ID #     Office notes sent, clinical questions answered. Awaiting determination    Turnaround time for your insurance to make a decision on your Prior Authorization can take 7-21 business days.

## 2024-08-15 NOTE — TELEPHONE ENCOUNTER
PA for Wegovy 0.25MG/0.5ML Approved     Date(s) approved 8/15/24 - 2/12/24 All strengths of the drug are approved.    Case #    Patient advised by          [x] MyChart Message  [] Phone call   []LMOM  []L/M to call office as no active Communication consent on file  []Unable to leave detailed message as VM not approved on Communication consent       Pharmacy advised by    [x]Fax  []Phone call    Approval letter scanned into Media Yes

## 2024-08-16 ENCOUNTER — TELEMEDICINE (OUTPATIENT)
Dept: BEHAVIORAL/MENTAL HEALTH CLINIC | Facility: CLINIC | Age: 34
End: 2024-08-16
Payer: COMMERCIAL

## 2024-08-16 DIAGNOSIS — F41.1 GENERALIZED ANXIETY DISORDER: Chronic | ICD-10-CM

## 2024-08-16 DIAGNOSIS — F33.1 DEPRESSION, MAJOR, RECURRENT, MODERATE (HCC): Primary | Chronic | ICD-10-CM

## 2024-08-16 PROCEDURE — 90834 PSYTX W PT 45 MINUTES: CPT | Performed by: SOCIAL WORKER

## 2024-08-19 ENCOUNTER — OFFICE VISIT (OUTPATIENT)
Dept: PHYSICAL THERAPY | Age: 34
End: 2024-08-19
Payer: COMMERCIAL

## 2024-08-19 DIAGNOSIS — G89.29 CHRONIC LOW BACK PAIN, UNSPECIFIED BACK PAIN LATERALITY, UNSPECIFIED WHETHER SCIATICA PRESENT: Primary | ICD-10-CM

## 2024-08-19 DIAGNOSIS — M54.50 CHRONIC LOW BACK PAIN, UNSPECIFIED BACK PAIN LATERALITY, UNSPECIFIED WHETHER SCIATICA PRESENT: Primary | ICD-10-CM

## 2024-08-19 DIAGNOSIS — M54.50 LUMBAR SPINE PAIN: ICD-10-CM

## 2024-08-19 PROCEDURE — 97113 AQUATIC THERAPY/EXERCISES: CPT | Performed by: PHYSICAL THERAPIST

## 2024-08-19 NOTE — PROGRESS NOTES
"Daily Note     Today's date: 2024  Patient name: Cruz Schneider  : 1990  MRN: [id number]  Referring provider: Oni Beckham DO  Dx:   Encounter Diagnosis     ICD-10-CM    1. Chronic low back pain, unspecified back pain laterality, unspecified whether sciatica present  M54.50     G89.29       2. Lumbar spine pain  M54.50                          Subjective: Patient reported \"I am achy, I feel like I need my B12 shot again\".         Objective: See treatment diary below        Assessment:  Patient presents with short term cervical, lumbar and bilateral upper and lower extremities pain reduction and mobility progress after pool based PT treatment sessions.  But, she lacks long term pain reduction in cervical, lumbar and bilateral upper and lower extremities that limit all self and house hold chores and activities.  Patient does present with pain reduction with therapeutic exercises and activities in the pool that she does not have the same symptom reduction response on land with PT and hep.  Thus, PT is warranted to facilitate cervical, lumbar and bilateral upper and lower extremity pain reduction to promote improved functional mobility during self and house hold activities.      Plan: Continue per plan of care.  Progress treatment as tolerated.       Access Code: [email or web address]  URL: [email or web address]  Date: 2024  Prepared by: Benjamin Machuca    Exercises  - Seated Cervical Retraction  - 3-4 x daily - 7 x weekly - 2 sets - 5 reps  - Lying Prone  - 2-3 x daily - 7 x weekly - 1 sets - 1 reps - 2-5 minutes hold  - Prone Press Up  - 2-3 x daily - 7 x weekly - 2 sets - 5 reps      Precautions: Patient's PMHx is remarkable for AS, Migraine, HTN, GERD, IBS, Myofascial Muscle Pain, Restless leg syndrome, Vitamin B 12 and D Deficiency.    Manuals 7/8/24 7/9 7/16 7/18 7/23 7/25 7/30  8/1 8/15 8/19                                                            Neuro Re-Ed                               " "           HEP: cervical retraction 5 x               HEP lumbar extension  via prone press ups 5 x 2                                                       Ther Ex                            Water walking pre and post  5x  5x  5x x5 X 5  x5 5 x  5x 5x    Standing lumbar spine extension against counter  20x  20x  20x 20x X 20   2 x 10 new hep! 20x  20x                   Seated hamstring stretch:B:  20sec 5x  20sec 5x  20\" 5x  20\" x 5  20\"x5 20 sec x 5 20sec 5x  20sec 5x     LAQ:B:  20x  20x  20x 20x X 20  x20 2 x 10 20x  20x     Hip flexion:B:  20x  20x  20x  X 20  x20 2 x 10 20x  20x     Seated postural correction slough over correct  20x  20x  20x 20x X 20  x20 2 x 10 20x  20x     Cervical spine retraction  20sec 5x  20sec 5x  20\"x5   20 \" x 5  20\"x5 2 x 10 20x  20x     UT stretch:B:  20sec 5x  20sec 5x  20\"x5 20\"x5 20\" x 5  20\"x5 20 sec x 5 20sec 5x  20sec 5x     LS stretch:B:  NT NT     20 sec x 5 20sec 5x  20sec 5x                   Standing scapular squeezes  20X 2SEC  20X 3SEC  20x 3\" 20\"x3 20\" x 3 20\"x3 3 sec x 20 20x 3sec  20x 3sec     Standing abdominal contraction with ball push down  20X  20X  20x  X 20  x20 NT NT NT    Standing slr x 3:B:  20X  20X  20x 20x X 20  x20 2 x 10 20X  2 x 10    Standing hr and tr:B:  20X  20X  20x 20x X 20  x20 2 x 10 20X  2 x 10    Mini squats  20X  20X  20x  20x X 20  x20 2 x 10 20X  2 x 10     SLS and tandem stance:B:  NT NT           Side stepping and tandem ambulation  6X  6X 6x 6x X 6  X 6 6 x  6X  6 x     Forward step ups:B:  NT NT      NT NT                  Paddles rows, horizontal add / abd and shoulder add / abd  NT NT      NT NT    Pool pony bicycle  NT 5 MIN  5 MIN 5 min X 5 min  X5 min 5 min 5 MIN  5 min                  Pool pony hang  NT 5 MIN  5 MIN 5 min  X 5 min  X5 min 5 min 5 MIN  5 min                  Q/L st B   20sec 3x  NT 20sec x3 [id number] 20 sec x 3 20 sec x3 20 sec x 3 20SEC 5X  20sec 5x                                                 Ther Activity   "                                        Gait Training                                          Modalities

## 2024-08-21 ENCOUNTER — TELEPHONE (OUTPATIENT)
Age: 34
End: 2024-08-21

## 2024-08-21 NOTE — TELEPHONE ENCOUNTER
Patient called requesting appt for B12 injection.     No order in system. Please advise and/or schedule accordingly.

## 2024-08-22 ENCOUNTER — CLINICAL SUPPORT (OUTPATIENT)
Dept: FAMILY MEDICINE CLINIC | Facility: CLINIC | Age: 34
End: 2024-08-22

## 2024-08-22 ENCOUNTER — OFFICE VISIT (OUTPATIENT)
Dept: PHYSICAL THERAPY | Age: 34
End: 2024-08-22
Payer: COMMERCIAL

## 2024-08-22 DIAGNOSIS — G89.29 CHRONIC LOW BACK PAIN, UNSPECIFIED BACK PAIN LATERALITY, UNSPECIFIED WHETHER SCIATICA PRESENT: Primary | ICD-10-CM

## 2024-08-22 DIAGNOSIS — M54.50 LUMBAR SPINE PAIN: ICD-10-CM

## 2024-08-22 DIAGNOSIS — E53.8 VITAMIN B12 DEFICIENCY: Primary | ICD-10-CM

## 2024-08-22 DIAGNOSIS — M54.50 CHRONIC LOW BACK PAIN, UNSPECIFIED BACK PAIN LATERALITY, UNSPECIFIED WHETHER SCIATICA PRESENT: Primary | ICD-10-CM

## 2024-08-22 PROCEDURE — 97113 AQUATIC THERAPY/EXERCISES: CPT

## 2024-08-22 NOTE — PROGRESS NOTES
Daily Note     Today's date: 2024  Patient name: Cruz Schneider  : 1990  MRN: [id number]  Referring provider: Oni Beckham DO  Dx:   Encounter Diagnosis     ICD-10-CM    1. Chronic low back pain, unspecified back pain laterality, unspecified whether sciatica present  M54.50     G89.29       2. Lumbar spine pain  M54.50                      Subjective: Pt reports 2/10 pain in R ribs       Objective: See treatment diary below      Assessment: Pt exhibits better overall mobility and ROM with completion of TE in pool. Responds positively to QL stretch in sitting to target R sided lateral rib discomfort. Positive symptom response post-tx. May benefit from progression of TE with weights or dumbbell floats if symptoms allow. Tolerated treatment well. Patient demonstrated fatigue post treatment, exhibited good technique with therapeutic exercises, and would benefit from continued PT      Plan: Continue per plan of care.  Progress treatment as tolerated.       Access Code: [email or web address]  URL: [email or web address]  Date: 2024  Prepared by: Benjamin Machuca    Exercises  - Seated Cervical Retraction  - 3-4 x daily - 7 x weekly - 2 sets - 5 reps  - Lying Prone  - 2-3 x daily - 7 x weekly - 1 sets - 1 reps - 2-5 minutes hold  - Prone Press Up  - 2-3 x daily - 7 x weekly - 2 sets - 5 reps      Precautions: Patient's PMHx is remarkable for AS, Migraine, HTN, GERD, IBS, Myofascial Muscle Pain, Restless leg syndrome, Vitamin B 12 and D Deficiency.    Manuals 7/8/24 7/9 7/16 7/18 7/23 7/25 7/30  8/1 8/15 8/22                                                       Neuro Re-Ed                                       HEP: cervical retraction 5 x              HEP lumbar extension  via prone press ups 5 x 2                                                   Ther Ex                          Water walking pre and post  5x  5x  5x x5 X 5  x5 5 x  5x 5x   Standing lumbar spine extension against counter  20x  20x   "20x 20x X 20   2 x 10 new hep! 20x  20x                Seated hamstring stretch:B:  20sec 5x  20sec 5x  20\" 5x  20\" x 5  20\"x5 20 sec x 5 20sec 5x  20sec 5x   LAQ:B:  20x  20x  20x 20x X 20  x20 2 x 10 20x  20x   Hip flexion:B:  20x  20x  20x  X 20  x20 2 x 10 20x  20x   Seated postural correction slough over correct  20x  20x  20x 20x X 20  x20 2 x 10 20x  20x   Cervical spine retraction  20sec 5x  20sec 5x  20\"x5   20 \" x 5  20\"x5 2 x 10 20x  20x   UT stretch:B:  20sec 5x  20sec 5x  20\"x5 20\"x5 20\" x 5  20\"x5 20 sec x 5 20sec 5x  20 sec 5x   LS stretch:B:  NT NT     20 sec x 5 20sec 5x  20 sec 5x                Standing scapular squeezes  20X 2SEC  20X 3SEC  20x 3\" 20\"x3 20\" x 3 20\"x3 3 sec x 20 20x 3sec  20x 3 sec   Standing abdominal contraction with ball push down  20X  20X  20x  X 20  x20 NT NT    Standing slr x 3:B:  20X  20X  20x 20x X 20  x20 2 x 10 20X  20x   Standing hr and tr:B:  20X  20X  20x 20x X 20  x20 2 x 10 20X  20x   Mini squats  20X  20X  20x  20x X 20  x20 2 x 10 20X  20x    SLS and tandem stance:B:  NT NT          Side stepping and tandem ambulation  6X  6X 6x 6x X 6  X 6 6 x  6X  6x   Forward step ups:B:  NT NT      NT NT                Paddles rows, horizontal add / abd and shoulder add / abd  NT NT      NT NT*   Pool pony bicycle  NT 5 MIN  5 MIN 5 min X 5 min  X5 min 5 min 5 MIN  5 min                Pool pony hang  NT 5 MIN  5 MIN 5 min  X 5 min  X5 min 5 min 5 MIN  5 min                Q/L st B   20sec 3x  NT 20sec x3 [id number] 20 sec x 3 20 sec x3 20 sec x 3 20SEC 5X  20 sec 5x                                            Ther Activity                                       Gait Training                                       Modalities                                                    "

## 2024-08-23 ENCOUNTER — OFFICE VISIT (OUTPATIENT)
Dept: DENTISTRY | Facility: CLINIC | Age: 34
End: 2024-08-23

## 2024-08-23 VITALS — HEART RATE: 91 BPM | DIASTOLIC BLOOD PRESSURE: 85 MMHG | TEMPERATURE: 98.6 F | SYSTOLIC BLOOD PRESSURE: 122 MMHG

## 2024-08-23 DIAGNOSIS — K02.9 CARIES: Primary | ICD-10-CM

## 2024-08-23 PROCEDURE — D2330 RESIN-BASED COMPOSITE - 1 SURFACE, ANTERIOR: HCPCS

## 2024-08-23 NOTE — DENTAL PROCEDURE DETAILS
Composite Restoration #24F, 25F, 26F    Cruz SERGIO Schneider 34 y.o. female presents with mom to [facility] for composite restoration  PMH reviewed, no changes, ASA III. Significant medical history: fibromyalgia. Significant allergies: listed on chart - non dental related medications. Significant medications: NSF.    Diagnosis:  Caries #24F, 25F, 26F    Prognosis:  good    Consent:  Risks of specific procedure: need for RCT if pulp exposure occurs or in future if pulp is inflamed, need to revise tx plan based on extent of decay, damage to adjacent tooth and/or restoration.  Risks of any dental procedure: post procedural pain or sensitivity, local anesthetic side effects, allergic reaction to dental materials and medications, breakage of local anesthetic needle, aspiration of small dental tools, injury to nearby hard and soft tissues and anatomical structures.  Benefits: prevent further breakdown of tooth and its sequelae.  Alternatives: no tx.  Tx plan for composite restoration #24F. 25F, 26F reviewed. Opportunity to ask questions given, all questions answered to degree of medical and dental certainty.  Patient understands and consent given by self via verbal consent.    Anesthesia:  Topical 20% benzocaine.  2 carps 2% Lidocaine 1:100k epi via buccal infiltration.    Procedure details:  Isolation: cotton rolls  Prepped teeth #24F, 25F, 26F with high speed handpiece.  Caries removed with round carbide on slow speed.  Band placement: not applicable/needed for this restoration.   00 cord with hemodent packed in sulcus for isolation  Etch with 37% H2PO4 15 seconds. Rinsed and suctioned.  Applied  with 20 second scrub, air dried, and light cured.  Restored with packable (A3 shade) and light cured.  Polished with enhance point.    Patient dismissed ambulatory and alert.  Explained to pt that they currently use non fluoridated toothpaste (due to pt hypersensitivity with toothpaste flavors). Explained that pt should  look into fluoride toothpastes due to pt extensive caries.     NV: Continue resins.    Attending:  Dr. Merritt  was present in clinic.

## 2024-08-23 NOTE — PSYCH
Virtual Regular Visit    Verification of patient location:    Patient is located at Home in the following state in which I hold an active license [place]      Assessment/Plan:    Problem List Items Addressed This Visit          Behavioral Health    Generalized anxiety disorder (Chronic)    Depression, major, recurrent, moderate (HCC) - Primary (Chronic)       Goals addressed in session: Goal 1          Reason for visit is   Chief Complaint   Patient presents with    Virtual Regular Visit          Encounter provider APARNA Fox      Recent Visits  Date Type Provider Dept   08/16/24 Telemedicine APARNA Fox [facility] Therapist [place]   Showing recent visits within past 7 days and meeting all other requirements  Future Appointments  No visits were found meeting these conditions.  Showing future appointments within next 150 days and meeting all other requirements       The patient was identified by name and date of birth. Cruz Schneider was informed that this is a telemedicine visit and that the visit is being conducted throughthe Epic Embedded platform. She agrees to proceed..  My office door was closed. No one else was in the room.  She acknowledged consent and understanding of privacy and security of the video platform. The patient has agreed to participate and understands they can discontinue the visit at any time.    Patient is aware this is a billable service.     Subjective  Cruz Schneider is a 34 y.o. female.      HPI     Past Medical History:   Diagnosis Date    Abnormal Pap smear of cervix     Allergic     Anxiety     Arthritis     Dental caries     Depression     Fibromyalgia, primary     GERD (gastroesophageal reflux disease)     Hypertension     IBS (irritable bowel syndrome)     Migraine     Obesity     Vitamin B12 deficiency        Past Surgical History:   Procedure Laterality Date    COLONOSCOPY N/A 5/8/2018    Procedure: COLONOSCOPY;  Surgeon: Stephanie Alston MD;   Location: [facility] GI LAB;  Service: Gastroenterology    NH EXC B9 LESION MRGN XCP SK TG S/N/H/F/G > 4.0CM N/A 7/1/2021    Procedure: EXCISION OF PILAR SCALP CYST X 2 AND RIGHT INNER GROIN NEVVUS;  Surgeon: Denis Barron MD;  Location:  MAIN OR;  Service: Plastics    NH REPAIR COMPLEX SCALP/ARM/LEG 2.6-7.5 CM N/A 7/1/2021    Procedure: CLOSURE WOUND SCALP X 2 AND RIGHT INNER GROIN;  Surgeon: Denis Barron MD;  Location:  MAIN OR;  Service: Plastics    TONSILLECTOMY      WISDOM TOOTH EXTRACTION         Current Outpatient Medications   Medication Sig Dispense Refill    ALPRAZolam (XANAX) 0.5 mg tablet Take 1 tablet (0.5 mg total) by mouth 3 (three) times a day as needed for anxiety 90 tablet 2    ascorbic acid (VITAMIN C) 500 MG tablet Take 500 mg by mouth daily      Botox 200 units SOLR       buPROPion (WELLBUTRIN XL) 300 mg 24 hr tablet Take 1 tablet (300 mg total) by mouth daily 90 tablet 0    Cholecalciferol (Vitamin D3) 50 MCG (2000 UT) capsule Take 1 capsule (2,000 Units total) by mouth daily 90 capsule 1    Cholecalciferol (Vitamin D3) 50 MCG (2000 UT) TABS       Cyanocobalamin (Vitamin B-12) 500 MCG SUBL Place 1 tablet (500 mcg total) under the tongue in the morning 30 tablet 2    cyanocobalamin 1,000 mcg/mL 1 IM injection every month 3 mL 3    dicyclomine (BENTYL) 10 mg capsule Take 2 capsules (20 mg total) by mouth 3 (three) times a day before meals 120 capsule 1    diphenoxylate-atropine (LOMOTIL) 2.5-0.025 mg per tablet Take 1 tablet by mouth 4 (four) times a day as needed for diarrhea 30 tablet 0    escitalopram (LEXAPRO) 20 mg tablet Take 1 tablet (20 mg total) by mouth daily 90 tablet 0    esomeprazole (NexIUM) 40 MG capsule Take 1 capsule (40 mg total) by mouth daily 90 capsule 3    etonogestrel-ethinyl estradiol (NuvaRing) 0.12-0.015 MG/24HR vaginal ring Insert ring for 21 days then remove for one week. 3 each 4    FeroSul 325 (65 Fe) MG tablet Take 1 tablet (325 mg total) by mouth in the  morning 90 tablet 1    gabapentin (Neurontin) 300 mg capsule Take 1 capsule (300 mg total) by mouth 3 (three) times a day 90 capsule 5    Galcanezumab-gnlm (Emgality) 120 MG/ML SOAJ One ml subcutaneous on the right thigh and 1 ml subcutaneous on the left thigh at the same time for 1 dose 2 mL 0    Galcanezumab-gnlm 120 MG/ML SOAJ Inject 120 mg under the skin every 30 (thirty) days 1 mL 11    indomethacin (INDOCIN) 25 mg capsule 1-2 tabs BID prn severe headache with food. Hold toradol. 30 capsule 6    loperamide (IMODIUM) 2 mg capsule Take 1 capsule (2 mg total) by mouth 4 (four) times a day as needed for diarrhea 12 capsule 0    magnesium Oxide (MAG-OX) 400 mg TABS Take 1 tablet (400 mg total) by mouth daily 30 tablet 5    methocarbamol (ROBAXIN) 500 mg tablet Take 1 tablet (500 mg total) by mouth 3 (three) times a day 270 tablet 1    OLANZapine (ZyPREXA) 7.5 mg tablet Take 1 tablet (7.5 mg total) by mouth daily at bedtime Do not take with reglan. 90 tablet 0    onabotulinumtoxin A (BOTOX) 100 units Inject as directed      ondansetron (ZOFRAN) 4 mg tablet Take 1 tablet (4 mg total) by mouth every 6 (six) hours 30 tablet 2    phentermine (ADIPEX-P) 37.5 MG tablet Take 1 tablet (37.5 mg total) by mouth in the morning 30 tablet 3    pramipexole (MIRAPEX) 0.25 mg tablet       prazosin (MINIPRESS) 2 mg capsule Take 1 capsule (2 mg total) by mouth daily at bedtime 90 capsule 0    predniSONE 10 mg tablet 4 pills daily for 3 days, 3 for 3 days, 2 for 3 days, 1 for 3 days. 30 tablet 0    Semaglutide-Weight Management (Wegovy) 0.25 MG/0.5ML Inject 0.25 mg under the skin weekly 2 mL 0    SODIUM FLUORIDE, DENTAL GEL, 1.1 % GEL After brushing thoroughly with toothpaste, rinse as usual. Apply a thin ribbon of gel to the teeth with a toothbrush  once daily for at least one minute, preferably at bedtime.After use, expectorate gel. For best results, do not eat, drink or rinse for 30 minutes. 100 mL 0    Trudhesa 0.725 MG/ACT AERS 1  spray in each nostril for total dose of 1.45mg, may repeat 1 dose after 1 hour. Max 2 doses per 24 hours and 3 doses per week. 12 mL 0     Current Facility-Administered Medications   Medication Dose Route Frequency Provider Last Rate Last Admin    cyanocobalamin injection 1,000 mcg  1,000 mcg Intramuscular Q30 Days Oni Beckham, DO   1,000 mcg at 03/14/24 1450        Allergies   Allergen Reactions    Cimzia [Certolizumab Pegol] Swelling    Desvenlafaxine      Other reaction(s): state of confusion    Ixekizumab Vomiting    Seasonal Ic [Octacosanol] Nasal Congestion    Tizanidine Anxiety       Review of Systems    Video Exam    There were no vitals filed for this visit.    Physical Exam     Behavioral Health Psychotherapy Progress Note    Psychotherapy Provided: Individual Psychotherapy     1. Depression, major, recurrent, moderate (HCC)        2. Generalized anxiety disorder            Goals addressed in session: Goal 1     DATA: Met with Stephanie for her scheduled individual session. Stephanie states that she continues to struggle with physical health issues. She reports being in a flare of her IBS symptoms. She states that she is continuing to work with his medical team to address her symptoms. We discussed her social interactions-- both with friends and with family members. She states that she and her mother are spending time together, as they continue to process her grandmother's death. She states that they have found someone to rent her grandmother's portion of the house, which creates some mixed emotions for her. We discussed activities that Stephanie engages in to improve her overall mood. She states that she wants to do crafting, but she has not had the motivation to do anything. This clinician encouraged her to get out the supplies and put them on her desk-- to simplify the process. She states that she will get out a project and set it up today. She continues to use some of her time to engage in mindful meditation;  "however, she states that she needs to increase her focus in this area. Stephanie states that she is putting some distance between her and her best friend. She also states that she has made a new friend in physical therapy. She states that she enjoys this woman's company, because they have some similarities in their symptoms. She feels that she needs to find more people who struggle with similar diagnoses, so she can feel more comfortable talking about her struggles.     During this session, this clinician used the following therapeutic modalities: Client-centered Therapy, Dialectical Behavior Therapy, Mindfulness-based Strategies, Motivational Interviewing, Solution-Focused Therapy, and Supportive Psychotherapy    Substance Abuse was not addressed during this session. If the client is diagnosed with a co-occurring substance use disorder, please indicate any changes in the frequency or amount of use: n/a. Stage of change for addressing substance use diagnoses: No substance use/Not applicable    ASSESSMENT:  Stephanie Schneider presents with a Euthymic/ normal mood.     her affect is Normal range and intensity, which is congruent, with her mood and the content of the session. The client has made progress on their goals.    Stephanie Schneider presents with a minimal risk of suicide, minimal risk of self-harm, and minimal risk of harm to others.    For any risk assessment that surpasses a \"low\" rating, a safety plan must be developed.    A safety plan was indicated: no  If yes, describe in detail n/a    PLAN: Between sessions, Stephanie Schneider will continue to monitor her moods. She will explore positive activities that she can mindfully participate in to change her mood. . At the next session, the therapist will use Client-centered Therapy, Dialectical Behavior Therapy, Mindfulness-based Strategies, Motivational Interviewing, Solution-Focused Therapy, and Supportive Psychotherapy to address her mood regulation, health issues, and " relationship concerns-- as well as her grief/loss concerns.    Behavioral Health Treatment Plan and Discharge Planning: Stephanie Schneider is aware of and agrees to continue to work on their treatment plan. They have identified and are working toward their discharge goals. yes    Visit start and stop times:    08/16/24  Start Time: 0812  Stop Time: 0856  Total Visit Time: 44 minutes

## 2024-08-26 ENCOUNTER — OFFICE VISIT (OUTPATIENT)
Dept: PHYSICAL THERAPY | Age: 34
End: 2024-08-26
Payer: COMMERCIAL

## 2024-08-26 DIAGNOSIS — M54.50 LUMBAR SPINE PAIN: ICD-10-CM

## 2024-08-26 DIAGNOSIS — G89.29 CHRONIC LOW BACK PAIN, UNSPECIFIED BACK PAIN LATERALITY, UNSPECIFIED WHETHER SCIATICA PRESENT: Primary | ICD-10-CM

## 2024-08-26 DIAGNOSIS — K29.00 ACUTE SUPERFICIAL GASTRITIS WITHOUT HEMORRHAGE: ICD-10-CM

## 2024-08-26 DIAGNOSIS — M54.50 CHRONIC LOW BACK PAIN, UNSPECIFIED BACK PAIN LATERALITY, UNSPECIFIED WHETHER SCIATICA PRESENT: Primary | ICD-10-CM

## 2024-08-26 PROCEDURE — 97113 AQUATIC THERAPY/EXERCISES: CPT | Performed by: PHYSICAL THERAPIST

## 2024-08-26 RX ORDER — ESOMEPRAZOLE MAGNESIUM 40 MG/1
40 CAPSULE, DELAYED RELEASE ORAL DAILY
Qty: 90 CAPSULE | Refills: 3 | Status: SHIPPED | OUTPATIENT
Start: 2024-08-26

## 2024-08-26 NOTE — PROGRESS NOTES
Daily Note     Today's date: 2024  Patient name: Cruz Schneider  : 1990  MRN: [id number]  Referring provider: Oni Beckham DO  Dx:   Encounter Diagnosis     ICD-10-CM    1. Chronic low back pain, unspecified back pain laterality, unspecified whether sciatica present  M54.50     G89.29       2. Lumbar spine pain  M54.50                      Subjective: Pt reports she is having a flare up that started 2 days ago with some c/o chest arthritis aching. Pt reports swelling in her left foot started at that time as well.       Objective: See treatment diary below.      Assessment: Tolerated treatment well. Patient exhibited good technique with therapeutic exercises and would benefit from continued PT.  Patient presents with short term pain and muscle spasm reduction during and after pool based PT treatment that allows for short term functional progress.  But, she lacks long term pain and muscle guarding / spasm that continues to limit self and house hold chores and activities.  Thus, PT to continue one more visit then reassess for patient progress.      Plan: Continue per plan of care.      Access Code: [email or web address]  URL: [email or web address]  Date: 2024  Prepared by: Benjamin Machuca    Exercises  - Seated Cervical Retraction  - 3-4 x daily - 7 x weekly - 2 sets - 5 reps  - Lying Prone  - 2-3 x daily - 7 x weekly - 1 sets - 1 reps - 2-5 minutes hold  - Prone Press Up  - 2-3 x daily - 7 x weekly - 2 sets - 5 reps      Precautions: Patient's PMHx is remarkable for AS, Migraine, HTN, GERD, IBS, Myofascial Muscle Pain, Restless leg syndrome, Vitamin B 12 and D Deficiency.    Manuals 7/23 7/25 7/30  8/1 8/15 8/22 8/26                                           Neuro Re-Ed                              HEP: cervical retraction          HEP lumbar extension  via prone press ups                                        Ther Ex                    Water walking pre and post x5 X 5  x5 5 x  5x 5x 5x  "  Standing lumbar spine extension against counter 20x X 20   2 x 10 new hep! 20x  20x 2 x 10             Seated hamstring stretch:B:  20\" x 5  20\"x5 20 sec x 5 20sec 5x  20sec 5x 20'x5   LAQ:B: 20x X 20  x20 2 x 10 20x  20x 20x   Hip flexion:B:  X 20  x20 2 x 10 20x  20x 20x   Seated postural correction slough over correct 20x X 20  x20 2 x 10 20x  20x 20x   Cervical spine retraction  20 \" x 5  20\"x5 2 x 10 20x  20x 20x   UT stretch:B: 20\"x5 20\" x 5  20\"x5 20 sec x 5 20sec 5x  20 sec 5x 20\"x5   LS stretch:B:    20 sec x 5 20sec 5x  20 sec 5x 20\"x5             Standing scapular squeezes 20\"x3 20\" x 3 20\"x3 3 sec x 20 20x 3sec  20x 3 sec 20x 3\"   Standing abdominal contraction with ball push down  X 20  x20 NT NT     Standing slr x 3:B: 20x X 20  x20 2 x 10 20X  20x 2 x 10   Standing hr and tr:B: 20x X 20  x20 2 x 10 20X  20x 2 x 10   Mini squats 20x X 20  x20 2 x 10 20X  20x 2 x 10    SLS and tandem stance:B:          Side stepping and tandem ambulation 6x X 6  X 6 6 x  6X  6x 6x   Forward step ups:B:     NT NT NT             Paddles rows, horizontal add / abd and shoulder add / abd     NT NT NT   Pool pony bicycle 5 min X 5 min  X5 min 5 min 5 MIN  5 min 5 min             Pool pony hang 5 min  X 5 min  X5 min 5 min 5 MIN  5 min 5 min             Q/L st B  [id number] 20 sec x 3 20 sec x3 20 sec x 3 20SEC 5X  20 sec 5x 20\"x5                                 Ther Activity                              Gait Training                              Modalities                                             "

## 2024-08-29 ENCOUNTER — OFFICE VISIT (OUTPATIENT)
Dept: PHYSICAL THERAPY | Age: 34
End: 2024-08-29
Payer: COMMERCIAL

## 2024-08-29 DIAGNOSIS — G89.29 CHRONIC LOW BACK PAIN, UNSPECIFIED BACK PAIN LATERALITY, UNSPECIFIED WHETHER SCIATICA PRESENT: Primary | ICD-10-CM

## 2024-08-29 DIAGNOSIS — M54.50 LUMBAR SPINE PAIN: ICD-10-CM

## 2024-08-29 DIAGNOSIS — M54.50 CHRONIC LOW BACK PAIN, UNSPECIFIED BACK PAIN LATERALITY, UNSPECIFIED WHETHER SCIATICA PRESENT: Primary | ICD-10-CM

## 2024-08-29 PROCEDURE — 97113 AQUATIC THERAPY/EXERCISES: CPT | Performed by: PHYSICAL THERAPIST

## 2024-08-29 NOTE — PROGRESS NOTES
PT Evaluation  / PT Reassessment / PT D/C    Today's date: 2024  Patient name: Cruz Schneider  : 1990  MRN: [id number]  Referring provider: Oni Beckham DO  Dx:   Encounter Diagnosis     ICD-10-CM    1. Chronic low back pain, unspecified back pain laterality, unspecified whether sciatica present  M54.50     G89.29       2. Lumbar spine pain  M54.50                      Assessment  Impairments: abnormal gait, abnormal or restricted ROM, abnormal movement, activity intolerance, impaired physical strength, lacks appropriate home exercise program and pain with function    Assessment details: PT Reassessment: 24.  Patient reported the following progress since onset of PT: lumbar spine mobility, bilateral lower extremity mobility, decrease in multiple joint stiffness, decrease in multiple level pain, walking, standing and self ialds and house hold chores and activities.  But, she noted the following deficits that still persists: strength, weakness and fatigue, lumbar and cervical spine and bilateral lower extremity and upper extremity pain.        PT IE: 24.  Patient noted she is having FM type pain aggravation in her lumbar spine and left hip pain aggravation.  Patient noted current symptoms: weakness in bilateral upper and lower extremities, core and postural musculature.  Patient noted lumbar spine and left hip pain, left calcaneal region hot burning pain and rib cage region pain.  Patient noted she feels a cramp type pain in her side due to the rib pain.  Patient noted the following deficits due to multiple region pain:  unable to resume fitness activities of hula hopping for weight control, walking, chair transfers, stair climbing, bending, lifting, carrying, squatting, prolonged standing to cook and wash dishes.  Patient noted exercise his helpful in pain reduction, but she noted pool based exercises are effective in pain reduction.  Patient noted pain at least is a 3 of 10 and an 8 of 10  at its worst.  Patient denies any new diagnostic testing.  Patient was getting infusions for Ankylosis Spondylosis, but got sick on the medication.    Understanding of Dx/Px/POC: excellent     Prognosis: good  Prognosis details: Patient is a 34 y.o. year old female seen for outpatient PT reevaluation with pain, mobility and functional deficits due to FM, chronic low back pain and left lumbar radiculopathy. Patient presents to PT on reassessment with the following progress since onset of PT: decrease in multiple region pain, increase in bilateral upper and lower extremity mobility and strength, gait and transfers, stair climbing, improvements in cervical and lumbar spine mobility and functional progress.  Patient presents to PT reassessment with the following problems, concerns, deficits and impairments: lumbar, thoracic and cervical spine pain, decreased lumbar and cervical spine range of motion, decreased bilateral upper and lower extremity mobility and strength, decrease in postural awareness, + TTP, + muscle guarding, functional limitations and decreased tolerance to activity.  Patient would benefit from skilled PT services under the following PT treatment plan to address the above noted deficits: therapeutic exercises and activities to facilitate lumbar and cervical spine mobility and bilateral upper and lower extremity strength, abdominal strengthening and DLS activities, postural reeducation and strengthening, modalities, manual therapy techniques, IASTM techniques, Kinesio taping techniques and a hep.  Thank you for the referral.     Goals  Short Term goals 4 - 6 weeks  1.  Patient will be independent HEP.  MET.  2.  Patient will report a 25 - 50% decrease in pain complaints. MET.  3.  Increase strength 1/2 grade.  MET.  4.  Increase ROM 5-10 degrees.  MET.    Long Term goals 8 - 12 weeks  1.  Patient will report elimination of pain complaints.Partially MET.  2.  Patient will return to all recreational  activities without restriction.Partially MET.  3.  ROM WFL.Partially MET.  4.  Strength 5/5.Partially MET.  5.  Patient will report walking improved by > 10 minutes prior to sitting to rest due to cervical, thoracic, lumbar, bilateral upper and lower extremity symptoms or limitations.Partially MET.  6.  Patient will report ability to perform two flights of steps without cervical, thoracic, lumbar, bilateral upper and lower extremity symptoms or limitations.Partially MET.  7.  Patient will report house hold chores involving static standing improved by > 10 minutes prior to cervical, thoracic, lumbar, bilateral upper and lower extremity symptoms or limitations.Partially MET.  8.  Patient will report ability to bend and lift during house hold activities without cervical, thoracic, lumbar, bilateral upper and lower extremity symptoms or limitations.Partially MET.  9.  Patient will report ability to reach during self and house hold iadls without cervical, thoracic, lumbar, bilateral upper and lower extremity symptoms or limitations.Partially MET.  10.  Patient will report ability to squat and carrying during house hold chores and activities without cervical, thoracic, lumbar, bilateral upper and lower extremity symptoms or limitations.Partially MET.  11.  Patient will report ability to resume fitness activities of hula hopping without cervical, thoracic, lumbar, bilateral upper and lower extremity symptoms or limitations.Partially MET.  12.  Patient will report ability to perform static standing activities like cooking and cleaning without cervical, thoracic, lumbar, bilateral upper and lower extremity symptoms or limitations.Partially MET.    Plan  Patient would benefit from: skilled physical therapy and PT eval  Planned modality interventions: cryotherapy, TENS, thermotherapy: hydrocollator packs, traction, ultrasound and unattended electrical stimulation    Planned therapy interventions: joint mobilization, manual  therapy, massage, aquatic therapy, balance, balance/weight bearing training, body mechanics training, muscle pump exercises, neuromuscular re-education, patient education, postural training, self care, compression, strengthening, stretching, therapeutic activities, therapeutic exercise, therapeutic training, flexibility, functional ROM exercises, gait training, graded activity, graded exercise, graded motor and home exercise program    Frequency: 2x week  Duration in weeks: 8  Treatment plan discussed with: patient        Subjective Evaluation    History of Present Illness  Mechanism of injury: Patient's PMHx is remarkable for AS, Migraine, HTN, GERD, IBS, Myofascial Muscle Pain, Restless leg syndrome, Vitamin B 12 and D Deficiency.  Patient Goals  Patient goals for therapy: decreased pain, increased motion, increased strength, independence with ADLs/IADLs and return to sport/leisure activities  Patient goal: Patient's goal is to resume her fitness activities and be more mobile.  Pain  At best pain ratin  At worst pain ratin  Location: lumbar, thoracic and cervical spine          Objective     Tenderness     Additional Tenderness Details  Patient is + moderate TTP and severe muscle guarding at thoracic and cervical spine paraspinal musculature and bilateral upper trapezius muscle region.  Patient exhibits protracted cervical spine at minimal to moderate levels.  Patient is + moderate TTP and muscle guarding at lumbar spine erector spinae musculature.    Active Range of Motion   Cervical/Thoracic Spine       Cervical    Flexion: 24 degrees   Extension: 35 degrees      Left lateral flexion: 25 degrees      Right lateral flexion: 28 degrees      Left rotation: 62 degrees  Right rotation: 58 degrees     Left Shoulder   Flexion: 158 degrees   Abduction: 158 degrees     Right Shoulder   Flexion: 162 degrees   Abduction: 160 degrees     Lumbar   Flexion: 94 degrees  with pain  Extension: 36 degrees  with pain  Left  lateral flexion: 18 degrees    with pain  Right lateral flexion: 16 degrees  with pain  Left rotation: 64 degrees  with pain  Right rotation: 60 degrees  with pain  Left Hip   Flexion: 80 degrees with pain  Abduction: 15 degrees with pain    Right Hip   Flexion: 82 degrees   Abduction: 18 degrees   Left Knee   Flexion: 94 degrees   Extension: -4 degrees   Extensor la degrees     Right Knee   Flexion: 106 degrees   Extension: -5 degrees   Extensor la degrees   Left Ankle/Foot   Dorsiflexion (ke): 6 degrees   Plantar flexion: 48 degrees     Right Ankle/Foot   Dorsiflexion (ke): 4 degrees   Plantar flexion: 46 degrees     Strength/Myotome Testing     Left Shoulder     Planes of Motion   Flexion: 4   Abduction: 4-   External rotation at 0°: 4-   Internal rotation at 0°: 4+     Right Shoulder     Planes of Motion   Flexion: 4   Abduction: 4   External rotation at 0°: 4   Internal rotation at 0°: 4+     Left Hip   Planes of Motion   Flexion: 4  Extension: 4  Abduction: 4  Adduction: 4+    Right Hip   Planes of Motion   Flexion: 4  Extension: 4  Abduction: 4  Adduction: 4+    Left Knee   Flexion: 4  Extension: 4-    Right Knee   Flexion: 4  Extension: 4-    Left Ankle/Foot   Dorsiflexion: 4+  Plantar flexion: 5    Right Ankle/Foot   Dorsiflexion: 4+  Plantar flexion: 5               Access Code: [email or web address]  URL: [email or web address]  Date: 2024  Prepared by: Benjamin Machuca    Exercises  - Seated Cervical Retraction  - 3-4 x daily - 7 x weekly - 2 sets - 5 reps  - Lying Prone  - 2-3 x daily - 7 x weekly - 1 sets - 1 reps - 2-5 minutes hold  - Prone Press Up  - 2-3 x daily - 7 x weekly - 2 sets - 5 reps      Precautions: Patient's PMHx is remarkable for AS, Migraine, HTN, GERD, IBS, Myofascial Muscle Pain, Restless leg syndrome, Vitamin B 12 and D Deficiency.    Manuals 7/23 7/25 7/30  8/1 8/15 8/22 8/26 8/29                                               Neuro Re-Ed                                "  HEP: cervical retraction           HEP lumbar extension  via prone press ups                                            Ther Ex                      Water walking pre and post x5 X 5  x5 5 x  5x 5x 5x 5 x    Standing lumbar spine extension against counter 20x X 20   2 x 10 new hep! 20x  20x 2 x 10 2 x 10              Seated hamstring stretch:B:  20\" x 5  20\"x5 20 sec x 5 20sec 5x  20sec 5x 20'x5 20 sec x 5   LAQ:B: 20x X 20  x20 2 x 10 20x  20x 20x 2 x 10   Hip flexion:B:  X 20  x20 2 x 10 20x  20x 20x 2 x 10   Seated postural correction slough over correct 20x X 20  x20 2 x 10 20x  20x 20x 2 x 10   Cervical spine retraction  20 \" x 5  20\"x5 2 x 10 20x  20x 20x 2 x 10   UT stretch:B: 20\"x5 20\" x 5  20\"x5 20 sec x 5 20sec 5x  20 sec 5x 20\"x5 20 sec x 5   LS stretch:B:    20 sec x 5 20sec 5x  20 sec 5x 20\"x5 20 sec x 5              Standing scapular squeezes 20\"x3 20\" x 3 20\"x3 3 sec x 20 20x 3sec  20x 3 sec 20x 3\" 3 sec x 20   Standing abdominal contraction with ball push down  X 20  x20 NT NT NT NT NT   Standing slr x 3:B: 20x X 20  x20 2 x 10 20X  20x 2 x 10 2 x 10   Standing hr and tr:B: 20x X 20  x20 2 x 10 20X  20x 2 x 10 2 x 10   Mini squats 20x X 20  x20 2 x 10 20X  20x 2 x 10 2 x 10    SLS and tandem stance:B:           Side stepping and tandem ambulation 6x X 6  X 6 6 x  6X  6x 6x 6 x    Forward step ups:B:     NT NT NT NT              Paddles rows, horizontal add / abd and shoulder add / abd     NT NT NT NT   Pool pony bicycle 5 min X 5 min  X5 min 5 min 5 MIN  5 min 5 min 5 min              Pool pony hang 5 min  X 5 min  X5 min 5 min 5 MIN  5 min 5 min 5 min              Q/L st B  [id number] 20 sec x 3 20 sec x3 20 sec x 3 20SEC 5X  20 sec 5x 20\"x5 20 sec x 5                                    Ther Activity                                 Gait Training                                 Modalities                                       "

## 2024-08-30 ENCOUNTER — TELEMEDICINE (OUTPATIENT)
Dept: BEHAVIORAL/MENTAL HEALTH CLINIC | Facility: CLINIC | Age: 34
End: 2024-08-30
Payer: COMMERCIAL

## 2024-08-30 DIAGNOSIS — F41.1 GENERALIZED ANXIETY DISORDER: Chronic | ICD-10-CM

## 2024-08-30 DIAGNOSIS — F33.1 DEPRESSION, MAJOR, RECURRENT, MODERATE (HCC): Primary | Chronic | ICD-10-CM

## 2024-08-30 PROCEDURE — 90837 PSYTX W PT 60 MINUTES: CPT | Performed by: SOCIAL WORKER

## 2024-08-30 NOTE — PSYCH
Virtual Regular Visit    Verification of patient location:    Patient is located at Home in the following state in which I hold an active license [place]      Assessment/Plan:    Problem List Items Addressed This Visit          Behavioral Health    Generalized anxiety disorder (Chronic)    Depression, major, recurrent, moderate (HCC) - Primary (Chronic)       Goals addressed in session: Goal 1          Reason for visit is   Chief Complaint   Patient presents with    Virtual Regular Visit          Encounter provider APARNA Fox      Recent Visits  Date Type Provider Dept   08/30/24 Telemedicine APARNA Fox [facility] Therapist [place]   Showing recent visits within past 7 days and meeting all other requirements  Future Appointments  No visits were found meeting these conditions.  Showing future appointments within next 150 days and meeting all other requirements       The patient was identified by name and date of birth. Cruz Schneider was informed that this is a telemedicine visit and that the visit is being conducted throughthe Epic Embedded platform. She agrees to proceed..  My office door was closed. No one else was in the room.  She acknowledged consent and understanding of privacy and security of the video platform. The patient has agreed to participate and understands they can discontinue the visit at any time.    Patient is aware this is a billable service.     Subjective  Cruz Schneider is a 34 y.o. female.      HPI     Past Medical History:   Diagnosis Date    Abnormal Pap smear of cervix     Allergic     Anxiety     Arthritis     Dental caries     Depression     Fibromyalgia, primary     GERD (gastroesophageal reflux disease)     Hypertension     IBS (irritable bowel syndrome)     Migraine     Obesity     Vitamin B12 deficiency        Past Surgical History:   Procedure Laterality Date    COLONOSCOPY N/A 5/8/2018    Procedure: COLONOSCOPY;  Surgeon: Stephanie Alston MD;  Location:  [facility];  Service: Gastroenterology    PA EXC B9 LESION MRGN XCP SK TG S/N/H/F/G > 4.0CM N/A 7/1/2021    Procedure: EXCISION OF PILAR SCALP CYST X 2 AND RIGHT INNER GROIN NEVVUS;  Surgeon: Denis Barron MD;  Location:  MAIN OR;  Service: Plastics    PA REPAIR COMPLEX SCALP/ARM/LEG 2.6-7.5 CM N/A 7/1/2021    Procedure: CLOSURE WOUND SCALP X 2 AND RIGHT INNER GROIN;  Surgeon: Denis Barron MD;  Location:  MAIN OR;  Service: Plastics    TONSILLECTOMY      WISDOM TOOTH EXTRACTION         Current Outpatient Medications   Medication Sig Dispense Refill    ALPRAZolam (XANAX) 0.5 mg tablet Take 1 tablet (0.5 mg total) by mouth 3 (three) times a day as needed for anxiety 90 tablet 2    ascorbic acid (VITAMIN C) 500 MG tablet Take 500 mg by mouth daily      Botox 200 units SOLR       buPROPion (WELLBUTRIN XL) 300 mg 24 hr tablet Take 1 tablet (300 mg total) by mouth daily 90 tablet 0    Cholecalciferol (Vitamin D3) 50 MCG (2000 UT) capsule Take 1 capsule (2,000 Units total) by mouth daily 90 capsule 1    Cholecalciferol (Vitamin D3) 50 MCG (2000 UT) TABS       Cyanocobalamin (Vitamin B-12) 500 MCG SUBL Place 1 tablet (500 mcg total) under the tongue in the morning 30 tablet 2    cyanocobalamin 1,000 mcg/mL 1 IM injection every month 3 mL 3    dicyclomine (BENTYL) 10 mg capsule Take 2 capsules (20 mg total) by mouth 3 (three) times a day before meals 120 capsule 1    diphenoxylate-atropine (LOMOTIL) 2.5-0.025 mg per tablet Take 1 tablet by mouth 4 (four) times a day as needed for diarrhea 30 tablet 0    escitalopram (LEXAPRO) 20 mg tablet Take 1 tablet (20 mg total) by mouth daily 90 tablet 0    esomeprazole (NexIUM) 40 MG capsule Take 1 capsule (40 mg total) by mouth daily 90 capsule 3    etonogestrel-ethinyl estradiol (NuvaRing) 0.12-0.015 MG/24HR vaginal ring Insert ring for 21 days then remove for one week. 3 each 4    FeroSul 325 (65 Fe) MG tablet Take 1 tablet (325 mg total) by mouth in the morning 90  tablet 1    gabapentin (Neurontin) 300 mg capsule Take 1 capsule (300 mg total) by mouth 3 (three) times a day 90 capsule 5    Galcanezumab-gnlm (Emgality) 120 MG/ML SOAJ One ml subcutaneous on the right thigh and 1 ml subcutaneous on the left thigh at the same time for 1 dose 2 mL 0    Galcanezumab-gnlm 120 MG/ML SOAJ Inject 120 mg under the skin every 30 (thirty) days 1 mL 11    indomethacin (INDOCIN) 25 mg capsule 1-2 tabs BID prn severe headache with food. Hold toradol. 30 capsule 6    loperamide (IMODIUM) 2 mg capsule Take 1 capsule (2 mg total) by mouth 4 (four) times a day as needed for diarrhea 12 capsule 0    magnesium Oxide (MAG-OX) 400 mg TABS Take 1 tablet (400 mg total) by mouth daily 30 tablet 5    methocarbamol (ROBAXIN) 500 mg tablet Take 1 tablet (500 mg total) by mouth 3 (three) times a day 270 tablet 1    OLANZapine (ZyPREXA) 7.5 mg tablet Take 1 tablet (7.5 mg total) by mouth daily at bedtime Do not take with reglan. 90 tablet 0    onabotulinumtoxin A (BOTOX) 100 units Inject as directed      ondansetron (ZOFRAN) 4 mg tablet Take 1 tablet (4 mg total) by mouth every 6 (six) hours 30 tablet 2    phentermine (ADIPEX-P) 37.5 MG tablet Take 1 tablet (37.5 mg total) by mouth in the morning 30 tablet 3    pramipexole (MIRAPEX) 0.25 mg tablet       prazosin (MINIPRESS) 2 mg capsule Take 1 capsule (2 mg total) by mouth daily at bedtime 90 capsule 0    predniSONE 10 mg tablet 4 pills daily for 3 days, 3 for 3 days, 2 for 3 days, 1 for 3 days. 30 tablet 0    Semaglutide-Weight Management (Wegovy) 0.25 MG/0.5ML Inject 0.25 mg under the skin weekly 2 mL 0    SODIUM FLUORIDE, DENTAL GEL, 1.1 % GEL After brushing thoroughly with toothpaste, rinse as usual. Apply a thin ribbon of gel to the teeth with a toothbrush  once daily for at least one minute, preferably at bedtime.After use, expectorate gel. For best results, do not eat, drink or rinse for 30 minutes. 100 mL 0    Trudhesa 0.725 MG/ACT AERS 1 spray in  "each nostril for total dose of 1.45mg, may repeat 1 dose after 1 hour. Max 2 doses per 24 hours and 3 doses per week. 12 mL 0     Current Facility-Administered Medications   Medication Dose Route Frequency Provider Last Rate Last Admin    cyanocobalamin injection 1,000 mcg  1,000 mcg Intramuscular Q30 Days Oni Beckham, DO   1,000 mcg at 03/14/24 1450        Allergies   Allergen Reactions    Cimzia [Certolizumab Pegol] Swelling    Desvenlafaxine      Other reaction(s): state of confusion    Ixekizumab Vomiting    Seasonal Ic [Octacosanol] Nasal Congestion    Tizanidine Anxiety       Review of Systems    Video Exam    There were no vitals filed for this visit.    Physical Exam     Behavioral Health Psychotherapy Progress Note    Psychotherapy Provided: Individual Psychotherapy     1. Depression, major, recurrent, moderate (HCC)        2. Generalized anxiety disorder            Goals addressed in session: Goal 1     DATA: Met with Stephanie for her scheduled session. \"I went out of my comfort zone.\" She showed me that she dyed the underside of her hair blue. She states that she got together with her friend (Jacqueline) and they had v really nice day together. We discussed her relationships-- with her friends and with her family members. Stephanie states that she is feeling somewhat improved mood. She attributes this to more consistent adherence with her medications. We spent time discussing the things she has been doing to improve her mood. She states that she has started to do some crafting, and she is also trying to get outside and take some walks. Overall, Stephanie states that the past couple weeks have been positive. She spoke about the new tenant who moved into her grandmother's home. She states that it is \"bitter-sweet\" to have this person move into her grandmother's space. She states that there are times that she hears the tenant making sounds that remind her of her grandmother. We discussed her overall grief process. " "Stephanie is able to see how she is moving through her grief and is accepting the fact that her grandmother is gone.                                                                                                                                                                                                           During this session, this clinician used the following therapeutic modalities: Client-centered Therapy, Dialectical Behavior Therapy, Mindfulness-based Strategies, Motivational Interviewing, Solution-Focused Therapy, and Supportive Psychotherapy    Substance Abuse was not addressed during this session. If the client is diagnosed with a co-occurring substance use disorder, please indicate any changes in the frequency or amount of use: n/a. Stage of change for addressing substance use diagnoses: No substance use/Not applicable    ASSESSMENT:  Stephanie Schneider presents with a Euthymic/ normal mood.     her affect is Normal range and intensity, which is congruent, with her mood and the content of the session. The client has made progress on their goals.    Stephanie Schneider presents with a minimal risk of suicide, minimal risk of self-harm, and minimal risk of harm to others.    For any risk assessment that surpasses a \"low\" rating, a safety plan must be developed.    A safety plan was indicated: no  If yes, describe in detail n/a    PLAN: Between sessions, Stephanie Schneider will continue to maintain adherence with her medications. She will continue to monitor her moods and will maintain appropriate limits/boundaries with friends. At the next session, the therapist will use Client-centered Therapy, Mindfulness-based Strategies, Motivational Interviewing, Solution-Focused Therapy, and Supportive Psychotherapy to address her mood regulation and relationship concerns.    Behavioral Health Treatment Plan and Discharge Planning: Stephanie Schneider is aware of and agrees to continue to work on their treatment plan. They have " identified and are working toward their discharge goals. yes    Visit start and stop times:    08/30/24  Start Time: 0808  Stop Time: 0902  Total Visit Time: 54 minutes

## 2024-09-05 ENCOUNTER — TELEPHONE (OUTPATIENT)
Dept: FAMILY MEDICINE CLINIC | Facility: CLINIC | Age: 34
End: 2024-09-05

## 2024-09-05 DIAGNOSIS — K29.00 ACUTE SUPERFICIAL GASTRITIS WITHOUT HEMORRHAGE: ICD-10-CM

## 2024-09-05 RX ORDER — ESOMEPRAZOLE MAGNESIUM 40 MG/1
40 CAPSULE, DELAYED RELEASE ORAL DAILY
Qty: 90 CAPSULE | Refills: 3 | Status: SHIPPED | OUTPATIENT
Start: 2024-09-05

## 2024-09-10 ENCOUNTER — OFFICE VISIT (OUTPATIENT)
Dept: FAMILY MEDICINE CLINIC | Facility: CLINIC | Age: 34
End: 2024-09-10
Payer: COMMERCIAL

## 2024-09-10 VITALS
OXYGEN SATURATION: 98 % | HEIGHT: 63 IN | SYSTOLIC BLOOD PRESSURE: 126 MMHG | DIASTOLIC BLOOD PRESSURE: 84 MMHG | HEART RATE: 85 BPM | TEMPERATURE: 99.2 F | RESPIRATION RATE: 16 BRPM | WEIGHT: 248.2 LBS | BODY MASS INDEX: 43.98 KG/M2

## 2024-09-10 DIAGNOSIS — B37.2 CANDIDAL DERMATITIS: Primary | ICD-10-CM

## 2024-09-10 PROCEDURE — 99213 OFFICE O/P EST LOW 20 MIN: CPT | Performed by: STUDENT IN AN ORGANIZED HEALTH CARE EDUCATION/TRAINING PROGRAM

## 2024-09-10 RX ORDER — NYSTATIN 100000 U/G
CREAM TOPICAL 2 TIMES DAILY
Qty: 15 G | Refills: 0 | Status: SHIPPED | OUTPATIENT
Start: 2024-09-10

## 2024-09-10 NOTE — PROGRESS NOTES
Ambulatory Visit  Name: Cruz Schneider      : 1990      MRN: [id number]  Encounter Provider: Benjamin Mckinnon MD  Encounter Date: 9/10/2024   Encounter department: [facility]    Assessment & Plan  Candidal dermatitis    Orders:    nystatin (MYCOSTATIN) cream; Apply topically 2 (two) times a day    Prescription for nystatin cream sent to pharmacy as indicated above.  Advised patient to keep neck area clean and dry to prevent recurrence of infection.  Patient expressed understanding.  Advised patient to follow-up with office if symptoms worsen or fail to improve.       History of Present Illness     Rash  This is a new problem. The current episode started 1 to 4 weeks ago. The problem has been gradually worsening since onset. The affected locations include the neck. The rash is characterized by itchiness. She was exposed to nothing. The rash first occurred at home. Pertinent negatives include no anorexia, congestion, cough, diarrhea, fatigue, fever, itching, shortness of breath or sore throat. Past treatments include anti-itch cream. The treatment provided no relief. There is no history of asthma or eczema. There were no sick contacts.         Review of Systems   Constitutional:  Negative for fatigue and fever.   HENT:  Negative for congestion and sore throat.    Respiratory:  Negative for cough and shortness of breath.    Gastrointestinal:  Negative for anorexia and diarrhea.   Skin:  Positive for rash. Negative for itching.           Objective     There were no vitals taken for this visit.    Physical Exam  Constitutional:       General: She is not in acute distress.     Appearance: Normal appearance. She is not ill-appearing.   HENT:      Head: Normocephalic and atraumatic.   Cardiovascular:      Rate and Rhythm: Normal rate and regular rhythm.      Heart sounds: Normal heart sounds. No murmur heard.  Pulmonary:      Effort: Pulmonary effort is normal. No respiratory  distress.      Breath sounds: Normal breath sounds.   Abdominal:      Palpations: Abdomen is soft.      Tenderness: There is no abdominal tenderness.   Musculoskeletal:      Right lower leg: No edema.      Left lower leg: No edema.   Skin:     Findings: Rash (Fungal rash noted along the neck folds) present.   Neurological:      Mental Status: She is alert.

## 2024-09-12 ENCOUNTER — TELEPHONE (OUTPATIENT)
Age: 34
End: 2024-09-12

## 2024-09-12 ENCOUNTER — TELEPHONE (OUTPATIENT)
Dept: FAMILY MEDICINE CLINIC | Facility: CLINIC | Age: 34
End: 2024-09-12

## 2024-09-12 DIAGNOSIS — E66.01 CLASS 3 SEVERE OBESITY DUE TO EXCESS CALORIES WITH SERIOUS COMORBIDITY AND BODY MASS INDEX (BMI) OF 45.0 TO 49.9 IN ADULT (HCC): ICD-10-CM

## 2024-09-12 DIAGNOSIS — E66.01 MORBID OBESITY WITH BMI OF 40.0-44.9, ADULT (HCC): Primary | ICD-10-CM

## 2024-09-12 RX ORDER — SEMAGLUTIDE 0.25 MG/.5ML
INJECTION, SOLUTION SUBCUTANEOUS
Qty: 4 ML | Refills: 0 | Status: SHIPPED | OUTPATIENT
Start: 2024-09-12 | End: 2024-09-12

## 2024-09-12 RX ORDER — SEMAGLUTIDE 1 MG/.5ML
INJECTION, SOLUTION SUBCUTANEOUS
Qty: 2 ML | Refills: 0 | Status: SHIPPED | OUTPATIENT
Start: 2024-09-12 | End: 2024-09-17 | Stop reason: SDUPTHER

## 2024-09-12 NOTE — TELEPHONE ENCOUNTER
Call from pharmacy, [facility] is backordered and has been for months. They will not be able to fill the script.

## 2024-09-13 ENCOUNTER — TELEMEDICINE (OUTPATIENT)
Dept: BEHAVIORAL/MENTAL HEALTH CLINIC | Facility: CLINIC | Age: 34
End: 2024-09-13
Payer: COMMERCIAL

## 2024-09-13 DIAGNOSIS — F41.1 GENERALIZED ANXIETY DISORDER: Chronic | ICD-10-CM

## 2024-09-13 DIAGNOSIS — F33.1 DEPRESSION, MAJOR, RECURRENT, MODERATE (HCC): Primary | Chronic | ICD-10-CM

## 2024-09-13 PROCEDURE — 90837 PSYTX W PT 60 MINUTES: CPT | Performed by: SOCIAL WORKER

## 2024-09-13 NOTE — PSYCH
Virtual Regular Visit    Verification of patient location:    Patient is located at Home in the following state in which I hold an active license [place]      Assessment/Plan:    Problem List Items Addressed This Visit          Behavioral Health    Generalized anxiety disorder (Chronic)    Depression, major, recurrent, moderate (HCC) - Primary (Chronic)       Goals addressed in session: Goal 1          Reason for visit is   Chief Complaint   Patient presents with    Virtual Regular Visit          Encounter provider APARNA Fox      Recent Visits  No visits were found meeting these conditions.  Showing recent visits within past 7 days and meeting all other requirements  Today's Visits  Date Type Provider Dept   09/13/24 Telemedicine APARNA Fox [facility] Therapist [place]   Showing today's visits and meeting all other requirements  Future Appointments  No visits were found meeting these conditions.  Showing future appointments within next 150 days and meeting all other requirements       The patient was identified by name and date of birth. Cruz Schneider was informed that this is a telemedicine visit and that the visit is being conducted throughthe Epic Embedded platform. She agrees to proceed..  My office door was closed. No one else was in the room.  She acknowledged consent and understanding of privacy and security of the video platform. The patient has agreed to participate and understands they can discontinue the visit at any time.    Patient is aware this is a billable service.     Subjective  Cruz Schneider is a 34 y.o. female.      HPI     Past Medical History:   Diagnosis Date    Abnormal Pap smear of cervix     Allergic     Anxiety     Arthritis     Dental caries     Depression     Fibromyalgia, primary     GERD (gastroesophageal reflux disease)     Hypertension     IBS (irritable bowel syndrome)     Migraine     Obesity     Vitamin B12 deficiency        Past Surgical History:    Procedure Laterality Date    COLONOSCOPY N/A 5/8/2018    Procedure: COLONOSCOPY;  Surgeon: Stephanie Alston MD;  Location: [facility] GI LAB;  Service: Gastroenterology    GA EXC B9 LESION MRGN XCP SK TG S/N/H/F/G > 4.0CM N/A 7/1/2021    Procedure: EXCISION OF PILAR SCALP CYST X 2 AND RIGHT INNER GROIN NEVVUS;  Surgeon: Denis Barron MD;  Location:  MAIN OR;  Service: Plastics    GA REPAIR COMPLEX SCALP/ARM/LEG 2.6-7.5 CM N/A 7/1/2021    Procedure: CLOSURE WOUND SCALP X 2 AND RIGHT INNER GROIN;  Surgeon: Denis Barron MD;  Location:  MAIN OR;  Service: Plastics    TONSILLECTOMY      WISDOM TOOTH EXTRACTION         Current Outpatient Medications   Medication Sig Dispense Refill    ALPRAZolam (XANAX) 0.5 mg tablet Take 1 tablet (0.5 mg total) by mouth 3 (three) times a day as needed for anxiety 90 tablet 2    ascorbic acid (VITAMIN C) 500 MG tablet Take 500 mg by mouth daily      Botox 200 units SOLR       buPROPion (WELLBUTRIN XL) 300 mg 24 hr tablet Take 1 tablet (300 mg total) by mouth daily 90 tablet 0    Cholecalciferol (Vitamin D3) 50 MCG (2000 UT) capsule Take 1 capsule (2,000 Units total) by mouth daily 90 capsule 1    Cholecalciferol (Vitamin D3) 50 MCG (2000 UT) TABS       Cyanocobalamin (Vitamin B-12) 500 MCG SUBL Place 1 tablet (500 mcg total) under the tongue in the morning 30 tablet 2    cyanocobalamin 1,000 mcg/mL 1 IM injection every month 3 mL 3    dicyclomine (BENTYL) 10 mg capsule Take 2 capsules (20 mg total) by mouth 3 (three) times a day before meals 120 capsule 1    diphenoxylate-atropine (LOMOTIL) 2.5-0.025 mg per tablet Take 1 tablet by mouth 4 (four) times a day as needed for diarrhea 30 tablet 0    escitalopram (LEXAPRO) 20 mg tablet Take 1 tablet (20 mg total) by mouth daily 90 tablet 0    esomeprazole (NexIUM) 40 MG capsule Take 1 capsule (40 mg total) by mouth daily 90 capsule 3    etonogestrel-ethinyl estradiol (NuvaRing) 0.12-0.015 MG/24HR vaginal ring Insert ring for 21 days then  remove for one week. 3 each 4    FeroSul 325 (65 Fe) MG tablet Take 1 tablet (325 mg total) by mouth in the morning 90 tablet 1    gabapentin (Neurontin) 300 mg capsule Take 1 capsule (300 mg total) by mouth 3 (three) times a day 90 capsule 5    Galcanezumab-gnlm (Emgality) 120 MG/ML SOAJ One ml subcutaneous on the right thigh and 1 ml subcutaneous on the left thigh at the same time for 1 dose 2 mL 0    Galcanezumab-gnlm 120 MG/ML SOAJ Inject 120 mg under the skin every 30 (thirty) days 1 mL 11    indomethacin (INDOCIN) 25 mg capsule 1-2 tabs BID prn severe headache with food. Hold toradol. 30 capsule 6    loperamide (IMODIUM) 2 mg capsule Take 1 capsule (2 mg total) by mouth 4 (four) times a day as needed for diarrhea 12 capsule 0    magnesium Oxide (MAG-OX) 400 mg TABS Take 1 tablet (400 mg total) by mouth daily 30 tablet 5    methocarbamol (ROBAXIN) 500 mg tablet Take 1 tablet (500 mg total) by mouth 3 (three) times a day 270 tablet 1    nystatin (MYCOSTATIN) cream Apply topically 2 (two) times a day 15 g 0    OLANZapine (ZyPREXA) 7.5 mg tablet Take 1 tablet (7.5 mg total) by mouth daily at bedtime Do not take with reglan. 90 tablet 0    onabotulinumtoxin A (BOTOX) 100 units Inject as directed      ondansetron (ZOFRAN) 4 mg tablet Take 1 tablet (4 mg total) by mouth every 6 (six) hours 30 tablet 2    phentermine (ADIPEX-P) 37.5 MG tablet Take 1 tablet (37.5 mg total) by mouth in the morning 30 tablet 3    pramipexole (MIRAPEX) 0.25 mg tablet  (Patient not taking: Reported on 9/10/2024)      prazosin (MINIPRESS) 2 mg capsule Take 1 capsule (2 mg total) by mouth daily at bedtime 90 capsule 0    predniSONE 10 mg tablet 4 pills daily for 3 days, 3 for 3 days, 2 for 3 days, 1 for 3 days. (Patient not taking: Reported on 9/10/2024) 30 tablet 0    Semaglutide-Weight Management (Wegovy) 1 MG/0.5ML Inject 1 mg under the skin weekly 2 mL 0    SODIUM FLUORIDE, DENTAL GEL, 1.1 % GEL After brushing thoroughly with toothpaste,  rinse as usual. Apply a thin ribbon of gel to the teeth with a toothbrush  once daily for at least one minute, preferably at bedtime.After use, expectorate gel. For best results, do not eat, drink or rinse for 30 minutes. 100 mL 0    Trudhesa 0.725 MG/ACT AERS 1 spray in each nostril for total dose of 1.45mg, may repeat 1 dose after 1 hour. Max 2 doses per 24 hours and 3 doses per week. 12 mL 0     Current Facility-Administered Medications   Medication Dose Route Frequency Provider Last Rate Last Admin    cyanocobalamin injection 1,000 mcg  1,000 mcg Intramuscular Q30 Days Oni Beckham, DO   1,000 mcg at 03/14/24 1450        Allergies   Allergen Reactions    Cimzia [Certolizumab Pegol] Swelling    Desvenlafaxine      Other reaction(s): state of confusion    Ixekizumab Vomiting    Seasonal Ic [Octacosanol] Nasal Congestion    Tizanidine Anxiety       Review of Systems    Video Exam    There were no vitals filed for this visit.    Physical Exam     Behavioral Health Psychotherapy Progress Note    Psychotherapy Provided: Individual Psychotherapy     1. Depression, major, recurrent, moderate (HCC)        2. Generalized anxiety disorder            Goals addressed in session: Goal 1     DATA: Met with Stephanie for her scheduled individual session. Stephanie discussed her overall health and feeling like she is in a continual flare. She talked about her attempts to continue to engage in healthy activities-- e.g., walking, crafting, etc. She is looking forward to her upcoming appointment in October with a new rheumatologist. Stephanie discussed relationships with friends and family. Stephanie states that she is feeling good about her relationship with Jacqueline currently. She states that she feels that she is doing better with this relationship, because she is trying to be mindful of her expectations for the relationship. Stephanie discussed her sadness re: not being a parent, but she states that she feels that she fills the role of the  "father for Jacqueline's girls. Stephanie discussed her desire to be a mental health therapist. We discussed the possibility of her participating in a CPS training. This clinician sent an email to the Peer Support Supervisor to ask her about resources to provide to Stephanie, if she wants to apply for the training in the future. Stephanie discussed her grief process regarding the loss of her grandmother. She states, \"There are good and bad days.\" We discussed the importance of allowing herself to feel the ways that she feels, without judgment. Stephanie states that she got some mindfulness cards, for practice; however, she states that she has not yet used them. We discussed the importance of regular mindfulness practice, to get her brain trained to calm down when she is using these skills.     During this session, this clinician used the following therapeutic modalities: Client-centered Therapy, Dialectical Behavior Therapy, Mindfulness-based Strategies, Motivational Interviewing, Solution-Focused Therapy, and Supportive Psychotherapy    Substance Abuse was not addressed during this session. If the client is diagnosed with a co-occurring substance use disorder, please indicate any changes in the frequency or amount of use: n/a. Stage of change for addressing substance use diagnoses: No substance use/Not applicable    ASSESSMENT:  Stephanie Schneider presents with a Euthymic/ normal mood.     her affect is Normal range and intensity, which is congruent, with her mood and the content of the session. The client has made progress on their goals.    Stephanie Schneider presents with a minimal risk of suicide, minimal risk of self-harm, and minimal risk of harm to others.    For any risk assessment that surpasses a \"low\" rating, a safety plan must be developed.    A safety plan was indicated: no  If yes, describe in detail n/a    PLAN: Between sessions, Stephanie Schneider will continue to work on practicing mindfulness skills. She will monitor her " moods and engage in activities that bring her ciara and lead to a sense of accomplishment. At the next session, the therapist will use Client-centered Therapy, Dialectical Behavior Therapy, Mindfulness-based Strategies, Motivational Interviewing, Solution-Focused Therapy, and Supportive Psychotherapy to address her mood regulation and relationship concerns.    Behavioral Health Treatment Plan and Discharge Planning: Stephanie Schneider is aware of and agrees to continue to work on their treatment plan. They have identified and are working toward their discharge goals. yes    Visit start and stop times:    09/13/24  Start Time: 0819  Stop Time: 0913  Total Visit Time: 54 minutes

## 2024-09-16 ENCOUNTER — TELEPHONE (OUTPATIENT)
Age: 34
End: 2024-09-16

## 2024-09-16 NOTE — TELEPHONE ENCOUNTER
Patient calls with a question regarding a new medication. Patient received a prescription for metoprolol. I cannot find any prescriptions for this medication. This medication is not listed on the patient's active medication list. Patient states that Dr. Oni Beckham is the prescribing MD. Please review and call the patient at [phone]. Patient has not started the medication as she did not understand why this medication was ordered for her.

## 2024-09-16 NOTE — TELEPHONE ENCOUNTER
Looks like this was prescribed back in 3/2024 but than was discontinued so not sure if she should be currently taking the metoprolol?

## 2024-09-17 DIAGNOSIS — E66.01 MORBID OBESITY WITH BMI OF 40.0-44.9, ADULT (HCC): ICD-10-CM

## 2024-09-17 RX ORDER — SEMAGLUTIDE 1 MG/.5ML
INJECTION, SOLUTION SUBCUTANEOUS
Qty: 2 ML | Refills: 0 | Status: SHIPPED | OUTPATIENT
Start: 2024-09-17 | End: 2024-09-18

## 2024-09-17 NOTE — TELEPHONE ENCOUNTER
Oni Beckham, DO to [facility]  4 hours ago  Call patient.  Patient not to take metoprolol.      Contacted pateint and made her aware she should not be taking the metoprolol. She is aware.

## 2024-09-17 NOTE — TELEPHONE ENCOUNTER
Please resend medication to [facility] in [place]. [facility] does not carry the medication. Please advise

## 2024-09-18 ENCOUNTER — VBI (OUTPATIENT)
Dept: ADMINISTRATIVE | Facility: OTHER | Age: 34
End: 2024-09-18

## 2024-09-18 RX ORDER — SEMAGLUTIDE 1 MG/.5ML
INJECTION, SOLUTION SUBCUTANEOUS
Qty: 4 ML | Refills: 0 | Status: SHIPPED | OUTPATIENT
Start: 2024-09-18

## 2024-09-18 NOTE — TELEPHONE ENCOUNTER
09/18/24 12:58 PM     Chart reviewed for Diabetic Eye Exam ; nothing is submitted to the patient's insurance at this time.     Anneliese Mancilla   [facility]

## 2024-09-25 DIAGNOSIS — M79.7 FIBROMYALGIA SYNDROME: ICD-10-CM

## 2024-09-25 DIAGNOSIS — M45.9 ANKYLOSING SPONDYLITIS, UNSPECIFIED SITE OF SPINE (HCC): ICD-10-CM

## 2024-09-25 RX ORDER — GABAPENTIN 300 MG/1
300 CAPSULE ORAL 3 TIMES DAILY
Qty: 90 CAPSULE | Refills: 5 | Status: SHIPPED | OUTPATIENT
Start: 2024-09-25

## 2024-09-26 ENCOUNTER — OFFICE VISIT (OUTPATIENT)
Dept: GASTROENTEROLOGY | Facility: MEDICAL CENTER | Age: 34
End: 2024-09-26
Payer: COMMERCIAL

## 2024-09-26 VITALS
OXYGEN SATURATION: 98 % | WEIGHT: 244.3 LBS | DIASTOLIC BLOOD PRESSURE: 90 MMHG | HEIGHT: 63 IN | BODY MASS INDEX: 43.29 KG/M2 | SYSTOLIC BLOOD PRESSURE: 140 MMHG | HEART RATE: 90 BPM | TEMPERATURE: 98.8 F

## 2024-09-26 DIAGNOSIS — R10.13 EPIGASTRIC PAIN: ICD-10-CM

## 2024-09-26 DIAGNOSIS — R19.8 ABNORMAL BOWEL HABITS: ICD-10-CM

## 2024-09-26 DIAGNOSIS — R19.7 DIARRHEA, UNSPECIFIED TYPE: Primary | ICD-10-CM

## 2024-09-26 PROCEDURE — 99214 OFFICE O/P EST MOD 30 MIN: CPT | Performed by: NURSE PRACTITIONER

## 2024-09-26 NOTE — PROGRESS NOTES
[facility] - Outpatient Follow-up Note  Crzu Schneider 34 y.o. female MRN: [id number]  Encounter: [id number]          ASSESSMENT AND PLAN:      1. Abnormal bowel habits    She has not followed up since her 12/23 visit.  Did not obtain stool studies.  Did obtain labs.  TSH was normal.  CRP was slightly elevated at 7.9.  It had previously been 24.5.  She does have ankylosing spondylitis.  She had been on Remicade but recently stopped it due to side effects.  Continues with bouts of loose stools that can occur 2-3 times per day alternating with harder stools.  No melena or hematochezia.  Last colonoscopy was about 5 to 6 years ago and normal per patient.  No weight loss.  Does take Bentyl 20 mg 4 times daily with some help with pain.  Will rule out inflammatory bowel disease versus IBS.    -Continue Bentyl 20 mg 4 times daily as needed  -Fecal calprotectin, stool for C. difficile, Giardia and enteric pathogen  -If testing is negative and symptoms persist consider repeat colonoscopy  -Follow-up in office in 2 to 3 months    2. GERD  3.  Epigastric pain    Did not obtain stool studies.  Did obtain labs.  TSH was normal.  CRP was slightly elevated at 7.9.  It had previously been 24.5.  She does have ankylosing spondylitis.  She had been on Remicade but recently stopped it due to side effects.  Continues with bouts of loose stools that can occur 2-3 times per day alternating with harder stools.  No melena or hematochezia.  Last colonoscopy was about 5 to 6 years ago and normal per patient.  No weight loss.     -Nexium 40 mg before breakfast and supper  -Continue Bentyl 20 mg 4 times daily as needed.  If this does not pain consider switching antispasmodic  -If symptoms persist consider repeat EGD  -Antireflux diet    ______________________________________________________________________    SUBJECTIVE: 34-year-old female here for follow-up.  She was last seen by GI 12/27/2023 for diarrhea  upper abdominal pain, nausea and GERD.    She has not followed up since her 12/23 visit.  Did not obtain stool studies.  Did obtain labs.  TSH was normal.  CRP was slightly elevated at 7.9.  It had previously been 24.5.  She does have ankylosing spondylitis.  She had been on Remicade but recently stopped it due to side effects.  Continues with bouts of loose stools that can occur 2-3 times per day alternating with harder stools.  No melena or hematochezia.  Last colonoscopy was about 5 to 6 years ago and normal per patient.  No weight loss.  Also reporting intermittent upper abdominal pain that can be worse postprandially.  She does have heartburn/reflux.  Currently taking Nexium 40 twice daily but taking second dose before bedtime.  Rare caffeine.  Rare alcohol and spicy food.  Rare NSAID use.    CT abdomen pelvis 12/23-normal    Lab 7/24-CMP normal, CBC normal other than platelets 450.    Prior EGD/colonoscopy     EGD 6/22-2 cm hiatal hernia otherwise normal.  Biopsies negative for celiac and H. pylori.    Colonoscopy 5 to 6 years ago-normal per patient.    REVIEW OF SYSTEMS IS OTHERWISE NEGATIVE.  10 point review is negative other than per HPI      Historical Information   Past Medical History:   Diagnosis Date    Abnormal Pap smear of cervix     Allergic     Anxiety     Arthritis     Dental caries     Depression     Fibromyalgia, primary     GERD (gastroesophageal reflux disease)     Hypertension     IBS (irritable bowel syndrome)     Migraine     Obesity     Vitamin B12 deficiency      Past Surgical History:   Procedure Laterality Date    COLONOSCOPY N/A 5/8/2018    Procedure: COLONOSCOPY;  Surgeon: Stephanie Alston MD;  [facility];  Service: Gastroenterology    DE EXC B9 LESION MRGN XCP SK TG S/N/H/F/G > 4.0CM N/A 7/1/2021    Procedure: EXCISION OF PILAR SCALP CYST X 2 AND RIGHT INNER GROIN NEVVUS;  Surgeon: Denis Barron MD;  Location:  MAIN OR;  Service: Plastics    DE REPAIR COMPLEX  SCALP/ARM/LEG 2.6-7.5 CM N/A 7/1/2021    Procedure: CLOSURE WOUND SCALP X 2 AND RIGHT INNER GROIN;  Surgeon: Denis Barron MD;  Location: [facility] OR;  Service: Plastics    TONSILLECTOMY      WISDOM TOOTH EXTRACTION       Social History   Social History     Substance and Sexual Activity   Alcohol Use Not Currently    Comment: rarely     Social History     Substance and Sexual Activity   Drug Use Yes    Frequency: 2.0 times per week    Types: Marijuana    Comment: medical marijuana (vape)     Social History     Tobacco Use   Smoking Status Never    Passive exposure: Past   Smokeless Tobacco Never     Family History   Problem Relation Age of Onset    Rheum arthritis Mother     Psoriasis Mother     Other Mother     Hypertension Mother     Diabetes unspecified Mother     Sjogren's syndrome Mother     Alcohol abuse Mother     Drug abuse Mother     Anxiety disorder Father     Alcohol abuse Father     Lung cancer Maternal Grandfather     Cancer Other         bone    Diabetes Other     Other Other         High blood pressure    Depression Maternal Grandmother        Meds/Allergies       Current Outpatient Medications:     ALPRAZolam (XANAX) 0.5 mg tablet    ascorbic acid (VITAMIN C) 500 MG tablet    Botox 200 units SOLR    buPROPion (WELLBUTRIN XL) 300 mg 24 hr tablet    Cholecalciferol (Vitamin D3) 50 MCG (2000 UT) capsule    Cholecalciferol (Vitamin D3) 50 MCG (2000 UT) TABS    cyanocobalamin 1,000 mcg/mL    dicyclomine (BENTYL) 10 mg capsule    diphenoxylate-atropine (LOMOTIL) 2.5-0.025 mg per tablet    escitalopram (LEXAPRO) 20 mg tablet    esomeprazole (NexIUM) 40 MG capsule    etonogestrel-ethinyl estradiol (NuvaRing) 0.12-0.015 MG/24HR vaginal ring    FeroSul 325 (65 Fe) MG tablet    gabapentin (NEURONTIN) 300 mg capsule    Galcanezumab-gnlm (Emgality) 120 MG/ML SOAJ    Galcanezumab-gnlm 120 MG/ML SOAJ    indomethacin (INDOCIN) 25 mg capsule    loperamide (IMODIUM) 2 mg capsule    methocarbamol (ROBAXIN) 500 mg  tablet    nystatin (MYCOSTATIN) cream    OLANZapine (ZyPREXA) 7.5 mg tablet    onabotulinumtoxin A (BOTOX) 100 units    ondansetron (ZOFRAN) 4 mg tablet    pramipexole (MIRAPEX) 0.25 mg tablet    prazosin (MINIPRESS) 2 mg capsule    Semaglutide-Weight Management (Wegovy) 1 MG/0.5ML    SODIUM FLUORIDE, DENTAL GEL, 1.1 % GEL    Trudhesa 0.725 MG/ACT AERS    Cyanocobalamin (Vitamin B-12) 500 MCG SUBL    magnesium Oxide (MAG-OX) 400 mg TABS    phentermine (ADIPEX-P) 37.5 MG tablet    predniSONE 10 mg tablet    Current Facility-Administered Medications:     cyanocobalamin injection 1,000 mcg, 1,000 mcg, Intramuscular, Q30 Days, 1,000 mcg at 03/14/24 1450    Allergies   Allergen Reactions    Cimzia [Certolizumab Pegol] Swelling    Desvenlafaxine      Other reaction(s): state of confusion    Ixekizumab Vomiting    Seasonal Ic [Octacosanol] Nasal Congestion    Tizanidine Anxiety           Objective     not currently breastfeeding. There is no height or weight on file to calculate BMI.      PHYSICAL EXAM:      General Appearance:   Alert, cooperative, no distress   HEENT:   Normocephalic, atraumatic, anicteric.     Neck:  Supple, symmetrical, trachea midline   Lungs:   Clear to auscultation bilaterally; no rales, rhonchi or wheezing; respirations unlabored    Heart::   Regular rate and rhythm; no murmur, rub, or gallop.   Abdomen:   Soft, non-tender, non-distended; normal bowel sounds; no masses, no organomegaly    Genitalia:   Deferred    Rectal:   Deferred    Extremities:  No cyanosis, clubbing or edema    Pulses:  2+ and symmetric    Skin:  No jaundice, rashes, or lesions    Lymph nodes:  No palpable cervical lymphadenopathy        Lab Results:   No visits with results within 1 Day(s) from this visit.   Latest known visit with results is:   Admission on 07/07/2024, Discharged on 07/07/2024   Component Date Value    WBC 07/07/2024 7.49     RBC 07/07/2024 4.28     Hemoglobin 07/07/2024 12.3     Hematocrit 07/07/2024 37.7      MCV 07/07/2024 88     MCH 07/07/2024 28.7     MCHC 07/07/2024 32.6     RDW 07/07/2024 12.8     MPV 07/07/2024 8.9     Platelets 07/07/2024 450 (H)     nRBC 07/07/2024 0     Segmented % 07/07/2024 51     Immature Grans % 07/07/2024 0     Lymphocytes % 07/07/2024 38     Monocytes % 07/07/2024 8     Eosinophils Relative 07/07/2024 3     Basophils Relative 07/07/2024 0     Absolute Neutrophils 07/07/2024 3.83     Absolute Immature Grans 07/07/2024 0.01     Absolute Lymphocytes 07/07/2024 2.85     Absolute Monocytes 07/07/2024 0.58     Eosinophils Absolute 07/07/2024 0.19     Basophils Absolute 07/07/2024 0.03     Sodium 07/07/2024 136     Potassium 07/07/2024 3.7     Chloride 07/07/2024 104     CO2 07/07/2024 22     ANION GAP 07/07/2024 10     BUN 07/07/2024 5     Creatinine 07/07/2024 0.64     Glucose 07/07/2024 88     Calcium 07/07/2024 9.1     AST 07/07/2024 23     ALT 07/07/2024 18     Alkaline Phosphatase 07/07/2024 59     Total Protein 07/07/2024 7.1     Albumin 07/07/2024 3.7     Total Bilirubin 07/07/2024 0.46     eGFR 07/07/2024 117     Lipase 07/07/2024 16     EXT Preg Test, Ur 07/07/2024 Negative     Control 07/07/2024 Valid     Color, UA 07/07/2024 Yellow     Clarity, UA 07/07/2024 Clear     Specific Gravity, UA 07/07/2024 1.025     pH, UA 07/07/2024 6.5     Leukocytes, UA 07/07/2024 Negative     Nitrite, UA 07/07/2024 Negative     Protein, UA 07/07/2024 Negative     Glucose, UA 07/07/2024 Negative     Ketones, UA 07/07/2024 Trace (A)     Urobilinogen, UA 07/07/2024 0.2     Bilirubin, UA 07/07/2024 Negative     Occult Blood, UA 07/07/2024 Negative          Radiology Results:   No results found.

## 2024-09-27 ENCOUNTER — APPOINTMENT (OUTPATIENT)
Dept: LAB | Facility: CLINIC | Age: 34
End: 2024-09-27
Payer: COMMERCIAL

## 2024-09-27 ENCOUNTER — TELEMEDICINE (OUTPATIENT)
Dept: BEHAVIORAL/MENTAL HEALTH CLINIC | Facility: CLINIC | Age: 34
End: 2024-09-27
Payer: COMMERCIAL

## 2024-09-27 ENCOUNTER — TELEPHONE (OUTPATIENT)
Age: 34
End: 2024-09-27

## 2024-09-27 DIAGNOSIS — R19.7 DIARRHEA, UNSPECIFIED TYPE: ICD-10-CM

## 2024-09-27 DIAGNOSIS — R19.8 ABNORMAL BOWEL HABITS: ICD-10-CM

## 2024-09-27 DIAGNOSIS — F33.1 DEPRESSION, MAJOR, RECURRENT, MODERATE (HCC): Primary | Chronic | ICD-10-CM

## 2024-09-27 DIAGNOSIS — F41.1 GENERALIZED ANXIETY DISORDER: Chronic | ICD-10-CM

## 2024-09-27 LAB — CRP SERPL QL: 14.1 MG/L

## 2024-09-27 PROCEDURE — 86140 C-REACTIVE PROTEIN: CPT

## 2024-09-27 PROCEDURE — 90837 PSYTX W PT 60 MINUTES: CPT | Performed by: SOCIAL WORKER

## 2024-09-27 PROCEDURE — 36415 COLL VENOUS BLD VENIPUNCTURE: CPT

## 2024-09-27 NOTE — PSYCH
Virtual Regular Visit    Verification of patient location:    Patient is located at Home in the following state in which I hold an active license [place]    Assessment/Plan:    Problem List Items Addressed This Visit          Behavioral Health    Generalized anxiety disorder (Chronic)    Depression, major, recurrent, moderate (HCC) - Primary (Chronic)     Goals addressed in session: Goal 1        Reason for visit is   Chief Complaint   Patient presents with    Virtual Regular Visit        Encounter provider APARNA Fox      Recent Visits  Date Type Provider Dept   09/27/24 Telemedicine APARNA Fox [facility] Therapist [place]   Showing recent visits within past 7 days and meeting all other requirements  Future Appointments  No visits were found meeting these conditions.  Showing future appointments within next 150 days and meeting all other requirements       The patient was identified by name and date of birth. Cruz Schneider was informed that this is a telemedicine visit and that the visit is being conducted throughthe Epic Embedded platform. She agrees to proceed..  My office door was closed. No one else was in the room.  She acknowledged consent and understanding of privacy and security of the video platform. The patient has agreed to participate and understands they can discontinue the visit at any time.    Patient is aware this is a billable service.     Subjective  Cruz Schneider is a 34 y.o. female.      HPI     Past Medical History:   Diagnosis Date    Abnormal Pap smear of cervix     Allergic     Anxiety     Arthritis     Dental caries     Depression     Fibromyalgia, primary     GERD (gastroesophageal reflux disease)     Hypertension     IBS (irritable bowel syndrome)     Migraine     Obesity     Vitamin B12 deficiency        Past Surgical History:   Procedure Laterality Date    COLONOSCOPY N/A 5/8/2018    Procedure: COLONOSCOPY;  Surgeon: Stephanie Alston MD;  Location: [facility]  GI LAB;  Service: Gastroenterology    KS EXC B9 LESION MRGN XCP SK TG S/N/H/F/G > 4.0CM N/A 7/1/2021    Procedure: EXCISION OF PILAR SCALP CYST X 2 AND RIGHT INNER GROIN NEVVUS;  Surgeon: Denis Barron MD;  Location:  MAIN OR;  Service: Plastics    KS REPAIR COMPLEX SCALP/ARM/LEG 2.6-7.5 CM N/A 7/1/2021    Procedure: CLOSURE WOUND SCALP X 2 AND RIGHT INNER GROIN;  Surgeon: Denis Barron MD;  Location:  MAIN OR;  Service: Plastics    TONSILLECTOMY      WISDOM TOOTH EXTRACTION         Current Outpatient Medications   Medication Sig Dispense Refill    ALPRAZolam (XANAX) 0.5 mg tablet Take 1 tablet (0.5 mg total) by mouth 3 (three) times a day as needed for anxiety 90 tablet 2    ascorbic acid (VITAMIN C) 500 MG tablet Take 500 mg by mouth daily      Botox 200 units SOLR       buPROPion (WELLBUTRIN XL) 300 mg 24 hr tablet Take 1 tablet (300 mg total) by mouth daily 90 tablet 0    Cholecalciferol (Vitamin D3) 50 MCG (2000 UT) capsule Take 1 capsule (2,000 Units total) by mouth daily 90 capsule 1    Cholecalciferol (Vitamin D3) 50 MCG (2000 UT) TABS       Cyanocobalamin (Vitamin B-12) 500 MCG SUBL Place 1 tablet (500 mcg total) under the tongue in the morning 30 tablet 2    cyanocobalamin 1,000 mcg/mL 1 IM injection every month 3 mL 3    dicyclomine (BENTYL) 10 mg capsule Take 2 capsules (20 mg total) by mouth 3 (three) times a day before meals 120 capsule 1    diphenoxylate-atropine (LOMOTIL) 2.5-0.025 mg per tablet Take 1 tablet by mouth 4 (four) times a day as needed for diarrhea 30 tablet 0    escitalopram (LEXAPRO) 20 mg tablet Take 1 tablet (20 mg total) by mouth daily 90 tablet 0    esomeprazole (NexIUM) 40 MG capsule Take 1 capsule (40 mg total) by mouth daily 90 capsule 3    etonogestrel-ethinyl estradiol (NuvaRing) 0.12-0.015 MG/24HR vaginal ring Insert ring for 21 days then remove for one week. 3 each 4    FeroSul 325 (65 Fe) MG tablet Take 1 tablet (325 mg total) by mouth in the morning 90 tablet 1     gabapentin (NEURONTIN) 300 mg capsule Take 1 capsule (300 mg total) by mouth 3 (three) times a day 90 capsule 5    Galcanezumab-gnlm (Emgality) 120 MG/ML SOAJ One ml subcutaneous on the right thigh and 1 ml subcutaneous on the left thigh at the same time for 1 dose 2 mL 0    Galcanezumab-gnlm 120 MG/ML SOAJ Inject 120 mg under the skin every 30 (thirty) days 1 mL 11    indomethacin (INDOCIN) 25 mg capsule 1-2 tabs BID prn severe headache with food. Hold toradol. 30 capsule 6    loperamide (IMODIUM) 2 mg capsule Take 1 capsule (2 mg total) by mouth 4 (four) times a day as needed for diarrhea 12 capsule 0    magnesium Oxide (MAG-OX) 400 mg TABS Take 1 tablet (400 mg total) by mouth daily 30 tablet 5    methocarbamol (ROBAXIN) 500 mg tablet Take 1 tablet (500 mg total) by mouth 3 (three) times a day 270 tablet 1    nystatin (MYCOSTATIN) cream Apply topically 2 (two) times a day 15 g 0    OLANZapine (ZyPREXA) 7.5 mg tablet Take 1 tablet (7.5 mg total) by mouth daily at bedtime Do not take with reglan. 90 tablet 0    onabotulinumtoxin A (BOTOX) 100 units Inject as directed      ondansetron (ZOFRAN) 4 mg tablet Take 1 tablet (4 mg total) by mouth every 6 (six) hours 30 tablet 2    phentermine (ADIPEX-P) 37.5 MG tablet Take 1 tablet (37.5 mg total) by mouth in the morning (Patient not taking: Reported on 9/26/2024) 30 tablet 3    pramipexole (MIRAPEX) 0.25 mg tablet       prazosin (MINIPRESS) 2 mg capsule Take 1 capsule (2 mg total) by mouth daily at bedtime 90 capsule 0    predniSONE 10 mg tablet 4 pills daily for 3 days, 3 for 3 days, 2 for 3 days, 1 for 3 days. (Patient not taking: Reported on 9/10/2024) 30 tablet 0    Semaglutide-Weight Management (Wegovy) 1 MG/0.5ML INJECT 1 MG SUBCUTANEOUSLY  ONCE A WEEK 4 mL 0    SODIUM FLUORIDE, DENTAL GEL, 1.1 % GEL After brushing thoroughly with toothpaste, rinse as usual. Apply a thin ribbon of gel to the teeth with a toothbrush  once daily for at least one minute, preferably at  "bedtime.After use, expectorate gel. For best results, do not eat, drink or rinse for 30 minutes. 100 mL 0    Trudhesa 0.725 MG/ACT AERS 1 spray in each nostril for total dose of 1.45mg, may repeat 1 dose after 1 hour. Max 2 doses per 24 hours and 3 doses per week. 12 mL 0     Current Facility-Administered Medications   Medication Dose Route Frequency Provider Last Rate Last Admin    cyanocobalamin injection 1,000 mcg  1,000 mcg Intramuscular Q30 Days Oni Beckham, DO   1,000 mcg at 03/14/24 1450        Allergies   Allergen Reactions    Cimzia [Certolizumab Pegol] Swelling    Desvenlafaxine      Other reaction(s): state of confusion    Ixekizumab Vomiting    Seasonal Ic [Octacosanol] Nasal Congestion    Tizanidine Anxiety       Review of Systems    Video Exam    There were no vitals filed for this visit.    Physical Exam     Behavioral Health Psychotherapy Progress Note    Psychotherapy Provided: Individual Psychotherapy     1. Depression, major, recurrent, moderate (HCC)        2. Generalized anxiety disorder            Goals addressed in session: Goal 1     DATA: Met with Stephanie for her scheduled individual session. \"Last week I was in an IBS flare.\" She discussed the connection between her mental health and her physical health. She states that her stomach was in so much pain that she was having difficulty drinking water. She reports that her body feels hungry; however, when she eats, she experiences significant pain and increased diarrhea. She states that she did not take her mental health medications during her flare, and she felt an increase in her mental health symptoms. She describes her mood as \"sad.\" She states that she was without medications for five days and started again on Sunday. She reports an improvement in her mood. We discussed the self-care that she was able to do, and she reports that she was able to \"push myself to walk.\" She states that the walking did improve her mood, but made her feel " "fatigued. Stephanie states that her Wegovy has led to an increase in hypoglycemia. Stephanie states that she has lost 20 lbs since starting the Wegovy. Stephanie discussed her practice of mindfulness, as a means of impacting her physical and emotional health. In addition, Stephanie's physicians have ordered labs, to see if they can find a cause for her IBS flare. Stephanie states that she has put some distance between herself and Jacqueline-- to try to determine what her boundaries are. She states that a lot of \"drama\" happened between Jacqueline and her . Stephanie states that Jacqueline also informed Stephanie that she was going to help out at a Trump rally. Stephanie states that she was very forthcoming with Jacqueline about her frustration/anger toward Jacqueline's decision to volunteer to be there. Stephanie discussed her thoughts/feelings about the upcoming election and her difficulties dealing with friends and family who are supportive of Trump. She talked about her concerns about being a member of the LGBTQIA+ community, if he is elected president. We discussed Stephanie's need for increased social support. She has not yet followed up with the [employer]. She will consider this again, as a means to increase social connections. Stephanie states that she went to the \"[employer]\" with a friend of hers. She met some people there who she feels connected to, and she plans to take some additional classes on crystals and other alternative healing techniques.     During this session, this clinician used the following therapeutic modalities: Client-centered Therapy, Dialectical Behavior Therapy, Mindfulness-based Strategies, Motivational Interviewing, Solution-Focused Therapy, and Supportive Psychotherapy    Substance Abuse was not addressed during this session. If the client is diagnosed with a co-occurring substance use disorder, please indicate any changes in the frequency or amount of use: n/a. Stage of change for addressing substance use diagnoses: No " "substance use/Not applicable    ASSESSMENT:  Stephanie Schneider presents with a Euthymic/ normal mood.     her affect is Normal range and intensity, which is congruent, with her mood and the content of the session. The client has made progress on their goals, as she continues to practice mindfulness-based strategies to manage her moods. She continues to remain in contemplation/preparation stage of change for increasing her social supports.     Stephanie Schneider presents with a minimal risk of suicide, minimal risk of self-harm, and minimal risk of harm to others.    For any risk assessment that surpasses a \"low\" rating, a safety plan must be developed.    A safety plan was indicated: no  If yes, describe in detail n/a    PLAN: Between sessions, Stephanie Schneider will continue to practice mindfulness-based strategies. She will consider reaching back out to [email or web address], as an option for increasing social supports. Stephanie's goals for the next two weeks: 1. Take meds, 2. Continue to meditate, 3. Keep walking and \"hooping\", 4. Limiting news and negative FB feeds, 5. Maintain limits/boundaries with [employer], and 6. Consider reaching out to [employer]. At the next session, the therapist will use Client-centered Therapy, Dialectical Behavior Therapy, Mindfulness-based Strategies, Motivational Interviewing, Solution-Focused Therapy, and Supportive Psychotherapy to address her mood regulation, physical health concerns, and relationship issues.    Behavioral Health Treatment Plan and Discharge Planning: Stephanie Schneider is aware of and agrees to continue to work on their treatment plan. They have identified and are working toward their discharge goals. yes    Visit start and stop times:    09/27/24  Start Time: 1000  Stop Time: 1056  Total Visit Time: 56 minutes        "

## 2024-09-27 NOTE — TELEPHONE ENCOUNTER
Pt calling in, she wanted to know what type of stool consistency she was to have in order to do the stool studies ordered by provider. I reviewed loose stools NO formed stool for c diff. She understood.

## 2024-10-01 DIAGNOSIS — E66.813 CLASS 3 SEVERE OBESITY DUE TO EXCESS CALORIES WITH SERIOUS COMORBIDITY AND BODY MASS INDEX (BMI) OF 45.0 TO 49.9 IN ADULT (HCC): Primary | ICD-10-CM

## 2024-10-01 DIAGNOSIS — E66.01 MORBID OBESITY WITH BMI OF 40.0-44.9, ADULT (HCC): Primary | ICD-10-CM

## 2024-10-01 DIAGNOSIS — E66.01 MORBID OBESITY WITH BMI OF 40.0-44.9, ADULT (HCC): ICD-10-CM

## 2024-10-01 DIAGNOSIS — E66.01 CLASS 3 SEVERE OBESITY DUE TO EXCESS CALORIES WITH SERIOUS COMORBIDITY AND BODY MASS INDEX (BMI) OF 45.0 TO 49.9 IN ADULT (HCC): Primary | ICD-10-CM

## 2024-10-01 RX ORDER — SEMAGLUTIDE 0.25 MG/.5ML
INJECTION, SOLUTION SUBCUTANEOUS
Qty: 2 ML | Refills: 2 | Status: SHIPPED | OUTPATIENT
Start: 2024-10-01 | End: 2024-10-02

## 2024-10-01 NOTE — TELEPHONE ENCOUNTER
[facility] pharmacist requesting clarification on Rx; Wegovy 0.25mg.  Patient has been on Wegovy 1mg.    Informed per Dr. Beckham's notation she has been titrated back to a lowe dose due to having side effects to the 1 mg dose.

## 2024-10-01 NOTE — TELEPHONE ENCOUNTER
Spoke with patient and she states her symptoms consist of gas, bloating, nausea, and experiencing hypoglycemia. Patient states these symptoms started about 2 days ago. She started the increase dose last week. Please advise.    Thank you

## 2024-10-01 NOTE — TELEPHONE ENCOUNTER
Patient experiencing side effects from the Wegovy. She is asking if this could be from her skipping a dose. Looks like patient went from 0.25 to 1mg

## 2024-10-01 NOTE — TELEPHONE ENCOUNTER
Patient called regarding Wegovy. States she got the message from the pharmacy that Wegovy 0.25mg was called in for her. States she does not want to go back to the beginning dose. She would like to go to 0.5mg. She would like Wegovy 0.5mg called the the pharmacy for her. Is having severe nausea and hypoglycemic episodes this week. Please follow up with patient.

## 2024-10-01 NOTE — TELEPHONE ENCOUNTER
Patient returning phone call. Unable to reach office. Patient would like to stay on medication but decrease dose.

## 2024-10-02 ENCOUNTER — TELEPHONE (OUTPATIENT)
Age: 34
End: 2024-10-02

## 2024-10-02 RX ORDER — SEMAGLUTIDE 0.5 MG/.5ML
INJECTION, SOLUTION SUBCUTANEOUS
Qty: 2 ML | Refills: 0 | Status: SHIPPED | OUTPATIENT
Start: 2024-10-02

## 2024-10-02 NOTE — TELEPHONE ENCOUNTER
Patient called upset that no one has called her about the increase dose of Wegovy 1 mg that was causing her some side effects.    Also stated she was prescribed BP medications and has never told she was being prescribed BP meds.    Patient would like to speak to Dr. Beckham directly regarding her medications.

## 2024-10-02 NOTE — TELEPHONE ENCOUNTER
Pharmacist requesting clarification on Rx: Wegovy patient has been on Rx: Wegovy 1mg and they received Rx: Wegovy 0.5mg.    Informed patient has been but back on a decrease does due to the intolerance of the medication.

## 2024-10-02 NOTE — TELEPHONE ENCOUNTER
Oni Beckham, DO to [facility]  2 hours ago  Okay.  Wegovy 0.5 mg subcu q. weekly dispense to  no refills

## 2024-10-04 ENCOUNTER — HOSPITAL ENCOUNTER (OUTPATIENT)
Dept: NUCLEAR MEDICINE | Facility: HOSPITAL | Age: 34
End: 2024-10-04
Payer: COMMERCIAL

## 2024-10-04 DIAGNOSIS — R10.13 EPIGASTRIC PAIN: ICD-10-CM

## 2024-10-04 PROCEDURE — 78227 HEPATOBIL SYST IMAGE W/DRUG: CPT

## 2024-10-04 PROCEDURE — A9537 TC99M MEBROFENIN: HCPCS

## 2024-10-04 RX ORDER — SINCALIDE 5 UG/5ML
0.02 INJECTION, POWDER, LYOPHILIZED, FOR SOLUTION INTRAVENOUS
Status: COMPLETED | OUTPATIENT
Start: 2024-10-04 | End: 2024-10-04

## 2024-10-04 RX ADMIN — SINCALIDE 2.2 MCG: 5 INJECTION, POWDER, LYOPHILIZED, FOR SOLUTION INTRAVENOUS at 13:08

## 2024-10-04 NOTE — PROGRESS NOTES
Ambulatory Visit  Name: Cruz Schneider      : 1990      MRN: [id number]  Encounter Provider: Cate Malave DO  Encounter Date: 10/7/2024   Encounter department: [facility] [place]    Assessment & Plan  Ankylosing spondylitis of multiple sites in spine ([facility])  Patient is a 34-year-old female presenting to establish car for ankylosing spondylitis.  Patient  was diagnosed around  when she developed ankle pain and concerns for inflammatory back pain.  Patient has been on multiple biologic therapy in the past and has been unable to tolerated due to side effects.  Has not noticed a significant difference in symptoms while on any therapy.  Has been unresponsive to NSAIDs.  Patient reports worsening joint symptoms with physical activity.  Does endorse nighttime awakening due to back pain, however no morning stiffness.  Denies dactylitis.  Currently majority of patient's symptoms are diffuse and worse with use which I suspect are likely due to fibromyalgia.  However given strong family history, previous inflammatory sounding back pain and history of costochondritis and plantar fasciitis we will investigate further.  Will obtain MRI of the pelvis to look for sacroiliitis or signs of inflammation. Based on results, will determine next steps in therapy.  Patient has been intolerant to many treatment in the past, we will need to consider retrial of medications if concerns for inflammatory back pain.  Orders:    Ambulatory Referral to Physical Therapy; Future    MRI pelvis w wo contrast; Future    Fibromyalgia syndrome  Patient continues to have symptoms of fibromyalgia with diffuse muscle aches, fatigue, brain fog, depression and poor sleep.  -Continue gabapentin per PCP  -Restart aqua therapy    Orders:    Ambulatory Referral to Physical Therapy; Future      History of Present Illness     Cruz Schneider is a 34 y.o. female with a history of fibromyalgia, anxiety, depression, B12  deficiency, GERD, IBS, migraines, hypertension who presents for follow-up of spondyloarthropathy.    History obtained from : patient    Patient states that her symptoms started 5 to 10 years before seeing a rheumatologist in  2017.  Reports that it started with pain in her ribs and sternum.  Reports that it was very tender to touch.  Reports that she cannot wear a bra.  Was diagnosed with costochondritis.  Patient states then she started to develop back pain and ankle pain.  Patient was diagnosed with ankylosing spondylitis and it has been on multiple therapies in the past.  Reports she has never really been able to take a drug consistently due to side effects.    Today, patient continues to endorse pain in her ribs as well as her thoracic spine.  Reports it is tender to palpation in her thoracic region.  Reports she has a trouble taking a deep breath in.  Also endorses some low back pain and pain in her groin. Patient states that her pain is worse with use.  Patient states that she feels the best in the morning.  Does report waking up in the middle of the night due to pain.    Patient reports pain in her MCPs and PIPs.  Denies joint swelling.  Reports the whole hand is diffusely tender to palpation.  Has not noticed any dactylitis like episodes.  Patient reports if her hands are tingling on her right side for prolonged period of time then the whole hand feels swollen.  Reports swelling in her ankles after standing on her feet for prolonged period of time.  Has tried compression stockings, however have not been comfortable.  Patient also reports pain in her heels and was told she has plantar fasciitis.  Endorses jaw pain.  Patient states that rainy weather really exacerbates her joint pain.    Patient states she takes Tylenol as needed which does help with the pain.  Reports she was on NSAIDs in the past which did not make a significant difference.  Patient reports that she never really had much relief with any  biologic therapy.  Patient states that potentially she felt slightly better on Enbrel.  She is not sure if the abdominal pain was due to her gallbladder or the side effect of Enbrel.    Patient also has a history of fibromyalgia syndrome.  Reports she has a lot of cognitive issues.  States she has memory loss, word finding difficulties, brain fog, difficulty multitasking.  Patient reports she has been dealing with anxiety and depression.  Patient reports she is on gabapentin.  Reports muscle relaxers also help with her pain.  Patient has done aqua therapy in the past which she felt was really helpful.  Reports her joint and chronic pain symptoms felt the best when she was doing therapy.    Endorses dry eyes and dry mouth.  Reports she has drops for her eyes.  Denies dysphagia.  Reports that she does deal with seasonal allergies.    Patient has a history of chronic migraines.  Reports hot and cold intolerance.  Patient also endorses issues with IBS that alternate between constipation and diarrhea.  Patient has GERD and is on Nexium twice daily.      Permanent history:  Per chart review, patient has been following with [facility] rheumatology and diagnosed with inflammatory spondyloarthropathy around 2017.  Patient has been on multiple medications in the past that had to be discontinued due to side effects including hydroxychloroquine (loose bowel movements), sulfasalazine (headaches), Humira, Enbrel, Cimzia (face, ankle and finger swelling), Taltz (swelling, nausea), Xeljanz (headaches and nausea) and Rinvoq (abdominal pain and nausea) which all had to be discontinued due to side effects.  Patient then started on Remicade 5/24/2023 however started feeling unwell (flushing, flu-like symptoms) on that medication as well and it was discontinued.  Patient gets multiple flareups that respond to prednisone tapers.  Has tried Imuran in the past which did not help.    Family hx:  PsO and PsA in mother and grandmother      Review of Systems    Denies:  Fever  Rash  Oral/nasal/genital ulcers  Dry eyes, dry mouth  Vision changes  Dysphagia/odynophagia  CP  MAURO  SOB at rest  Hematochezia  Gross hematuria  Muscle weakness   Dactylitis  Raynaud's  Joint issues other than noted above    Past Medical History:   Diagnosis Date    Abnormal Pap smear of cervix     Allergic     Anxiety     Arthritis     Dental caries     Depression     Fibromyalgia, primary     GERD (gastroesophageal reflux disease)     Hypertension     IBS (irritable bowel syndrome)     Migraine     Obesity     Vitamin B12 deficiency        Current Outpatient Medications on File Prior to Visit   Medication Sig Dispense Refill    ALPRAZolam (XANAX) 0.5 mg tablet Take 1 tablet (0.5 mg total) by mouth 3 (three) times a day as needed for anxiety 90 tablet 2    ascorbic acid (VITAMIN C) 500 MG tablet Take 500 mg by mouth daily      Botox 200 units SOLR       buPROPion (WELLBUTRIN XL) 300 mg 24 hr tablet Take 1 tablet (300 mg total) by mouth daily 90 tablet 0    Cholecalciferol (Vitamin D3) 50 MCG (2000 UT) capsule Take 1 capsule (2,000 Units total) by mouth daily 90 capsule 1    cyanocobalamin 1,000 mcg/mL 1 IM injection every month 3 mL 3    dicyclomine (BENTYL) 10 mg capsule Take 2 capsules (20 mg total) by mouth 3 (three) times a day before meals 120 capsule 1    diphenoxylate-atropine (LOMOTIL) 2.5-0.025 mg per tablet Take 1 tablet by mouth 4 (four) times a day as needed for diarrhea 30 tablet 0    escitalopram (LEXAPRO) 20 mg tablet Take 1 tablet (20 mg total) by mouth daily 90 tablet 0    esomeprazole (NexIUM) 40 MG capsule Take 1 capsule (40 mg total) by mouth daily 90 capsule 3    etonogestrel-ethinyl estradiol (NuvaRing) 0.12-0.015 MG/24HR vaginal ring Insert ring for 21 days then remove for one week. 3 each 4    FeroSul 325 (65 Fe) MG tablet Take 1 tablet (325 mg total) by mouth in the morning 90 tablet 1    gabapentin (NEURONTIN) 300 mg capsule Take 1 capsule (300 mg  total) by mouth 3 (three) times a day 90 capsule 5    Galcanezumab-gnlm (Emgality) 120 MG/ML SOAJ One ml subcutaneous on the right thigh and 1 ml subcutaneous on the left thigh at the same time for 1 dose 2 mL 0    Galcanezumab-gnlm 120 MG/ML SOAJ Inject 120 mg under the skin every 30 (thirty) days 1 mL 11    indomethacin (INDOCIN) 25 mg capsule 1-2 tabs BID prn severe headache with food. Hold toradol. 30 capsule 6    loperamide (IMODIUM) 2 mg capsule Take 1 capsule (2 mg total) by mouth 4 (four) times a day as needed for diarrhea 12 capsule 0    methocarbamol (ROBAXIN) 500 mg tablet Take 1 tablet (500 mg total) by mouth 3 (three) times a day 270 tablet 1    metoprolol succinate (TOPROL-XL) 25 mg 24 hr tablet       nystatin (MYCOSTATIN) cream Apply topically 2 (two) times a day 15 g 0    OLANZapine (ZyPREXA) 7.5 mg tablet Take 1 tablet (7.5 mg total) by mouth daily at bedtime Do not take with reglan. 90 tablet 0    onabotulinumtoxin A (BOTOX) 100 units Inject as directed      ondansetron (ZOFRAN) 4 mg tablet Take 1 tablet (4 mg total) by mouth every 6 (six) hours 30 tablet 2    pramipexole (MIRAPEX) 0.25 mg tablet       prazosin (MINIPRESS) 2 mg capsule Take 1 capsule (2 mg total) by mouth daily at bedtime 90 capsule 0    Semaglutide-Weight Management (Wegovy) 0.5 MG/0.5ML Inject 0.5 mg under the skin weekly 2 mL 0    SODIUM FLUORIDE, DENTAL GEL, 1.1 % GEL After brushing thoroughly with toothpaste, rinse as usual. Apply a thin ribbon of gel to the teeth with a toothbrush  once daily for at least one minute, preferably at bedtime.After use, expectorate gel. For best results, do not eat, drink or rinse for 30 minutes. 100 mL 0    Trudhesa 0.725 MG/ACT AERS 1 spray in each nostril for total dose of 1.45mg, may repeat 1 dose after 1 hour. Max 2 doses per 24 hours and 3 doses per week. 12 mL 0    Cholecalciferol (Vitamin D3) 50 MCG (2000 UT) TABS  (Patient not taking: Reported on 10/7/2024)      Cyanocobalamin (Vitamin  "B-12) 500 MCG SUBL Place 1 tablet (500 mcg total) under the tongue in the morning 30 tablet 2    magnesium Oxide (MAG-OX) 400 mg TABS Take 1 tablet (400 mg total) by mouth daily 30 tablet 5    phentermine (ADIPEX-P) 37.5 MG tablet Take 1 tablet (37.5 mg total) by mouth in the morning (Patient not taking: Reported on 9/26/2024) 30 tablet 3    predniSONE 10 mg tablet 4 pills daily for 3 days, 3 for 3 days, 2 for 3 days, 1 for 3 days. (Patient not taking: Reported on 9/10/2024) 30 tablet 0     Current Facility-Administered Medications on File Prior to Visit   Medication Dose Route Frequency Provider Last Rate Last Admin    cyanocobalamin injection 1,000 mcg  1,000 mcg Intramuscular Q30 Days Oni Beckham DO   1,000 mcg at 03/14/24 1450      Social History     Tobacco Use    Smoking status: Never     Passive exposure: Past    Smokeless tobacco: Never   Vaping Use    Vaping status: Never Used   Substance and Sexual Activity    Alcohol use: Not Currently     Comment: rarely    Drug use: Yes     Frequency: 2.0 times per week     Types: Marijuana     Comment: medical marijuana (vape)    Sexual activity: Not Currently     Partners: Male     Comment: Nuva ring       Objective     /88   Pulse 90   Ht 5' 3\" (1.6 m)   Wt 110 kg (242 lb)   SpO2 100%   BMI 42.87 kg/m²     Physical Exam  General appearance: normal appearing, no acute distress  Skin: normal, no rashes  HEENT: normal, moist oropharynx, no nasal or oral ulcers  Lymph nodes: no palpable adenopathy  Lungs: normal respiratory effort, comfortable on room air, lungs clear to auscultation b/l   Heart: normal heart sounds, normal rate, normal rhythm,  Abdomen: soft, normal bowel sounds, no tenderness  Neurologic: no obvious neurological deficits   Extremities: no edema, warm and well perfused     Musculoskeletal Exam:   - Observation: no obvious joint abnormalities    - Palpation: Diffusely tender to palpation in the thoracic paraspinal region  - Synovitis: " absent  - Joint effusions: absent  - ROM: intact throughout, (+) DWIGHT test bilaterally   - Muscle Strength: 5/5 throughout       I have independently reviewed the following labs/images and interpret them as follows.  RF negative   CCP negative   BERNADINE negative     XR hip 9/25/21  There is no acute fracture or dislocation.  No significant hip degenerative changes.  No lytic or blastic osseous lesion.  Soft tissues are unremarkable.  The visualized lumbar spine is unremarkable.      Cate Malave DO, CCD, [facility]  [facility]

## 2024-10-07 ENCOUNTER — OFFICE VISIT (OUTPATIENT)
Dept: RHEUMATOLOGY | Facility: CLINIC | Age: 34
End: 2024-10-07
Payer: COMMERCIAL

## 2024-10-07 ENCOUNTER — PREP FOR PROCEDURE (OUTPATIENT)
Dept: GASTROENTEROLOGY | Facility: MEDICAL CENTER | Age: 34
End: 2024-10-07

## 2024-10-07 ENCOUNTER — TELEPHONE (OUTPATIENT)
Dept: RHEUMATOLOGY | Facility: CLINIC | Age: 34
End: 2024-10-07

## 2024-10-07 ENCOUNTER — TELEPHONE (OUTPATIENT)
Age: 34
End: 2024-10-07

## 2024-10-07 VITALS
HEIGHT: 63 IN | DIASTOLIC BLOOD PRESSURE: 88 MMHG | SYSTOLIC BLOOD PRESSURE: 128 MMHG | BODY MASS INDEX: 42.88 KG/M2 | WEIGHT: 242 LBS | OXYGEN SATURATION: 100 % | HEART RATE: 90 BPM

## 2024-10-07 DIAGNOSIS — M45.0 ANKYLOSING SPONDYLITIS OF MULTIPLE SITES IN SPINE (HCC): Primary | ICD-10-CM

## 2024-10-07 DIAGNOSIS — K21.9 GASTROESOPHAGEAL REFLUX DISEASE WITHOUT ESOPHAGITIS: Primary | ICD-10-CM

## 2024-10-07 DIAGNOSIS — R19.4 CHANGE IN BOWEL HABIT: ICD-10-CM

## 2024-10-07 DIAGNOSIS — R10.13 EPIGASTRIC PAIN: Primary | ICD-10-CM

## 2024-10-07 DIAGNOSIS — M79.7 FIBROMYALGIA SYNDROME: ICD-10-CM

## 2024-10-07 PROCEDURE — 99214 OFFICE O/P EST MOD 30 MIN: CPT | Performed by: STUDENT IN AN ORGANIZED HEALTH CARE EDUCATION/TRAINING PROGRAM

## 2024-10-07 RX ORDER — RABEPRAZOLE SODIUM 20 MG/1
20 TABLET, DELAYED RELEASE ORAL 2 TIMES DAILY
Qty: 60 TABLET | Refills: 3 | Status: SHIPPED | OUTPATIENT
Start: 2024-10-07

## 2024-10-07 RX ORDER — METOPROLOL SUCCINATE 25 MG/1
TABLET, EXTENDED RELEASE ORAL
COMMUNITY
Start: 2024-09-05 | End: 2024-10-18 | Stop reason: ALTCHOICE

## 2024-10-07 RX ORDER — POLYETHYLENE GLYCOL 3350, SODIUM SULFATE ANHYDROUS, SODIUM BICARBONATE, SODIUM CHLORIDE, POTASSIUM CHLORIDE 236; 22.74; 6.74; 5.86; 2.97 G/4L; G/4L; G/4L; G/4L; G/4L
4000 POWDER, FOR SOLUTION ORAL ONCE
Qty: 4000 ML | Refills: 0 | Status: SHIPPED | OUTPATIENT
Start: 2024-10-07 | End: 2024-10-07

## 2024-10-07 NOTE — TELEPHONE ENCOUNTER
Rheumatology Pre-Certification Request     Medication/Disease State Information:   Diagnosis: Ankylosing spondylitis  Imaging: MRI of the pelvis    Cate Malave DO, CCD, [place]  NPI: [id number]  Lic: [id number]

## 2024-10-07 NOTE — TELEPHONE ENCOUNTER
Patient calling to get scheduled for colonoscopy/egd.  Patient stated that provider said someone would call today.  Explained that she would be getting call in the next 2 days to schedule.  Patient stated understanding.

## 2024-10-07 NOTE — ASSESSMENT & PLAN NOTE
Patient continues to have symptoms of fibromyalgia with diffuse muscle aches, fatigue, brain fog, depression and poor sleep.  -Continue gabapentin per PCP  -Restart aqua therapy    Orders:    Ambulatory Referral to Physical Therapy; Future

## 2024-10-07 NOTE — ASSESSMENT & PLAN NOTE
Patient is a 34-year-old female presenting to establish car for ankylosing spondylitis.  Patient  was diagnosed around 2017 when she developed ankle pain and concerns for inflammatory back pain.  Patient has been on multiple biologic therapy in the past and has been unable to tolerated due to side effects.  Has not noticed a significant difference in symptoms while on any therapy.  Has been unresponsive to NSAIDs.  Patient reports worsening joint symptoms with physical activity.  Does endorse nighttime awakening due to back pain, however no morning stiffness.  Denies dactylitis.  Currently majority of patient's symptoms are diffuse and worse with use which I suspect are likely due to fibromyalgia.  However given strong family history, previous inflammatory sounding back pain and history of costochondritis and plantar fasciitis we will investigate further.  Will obtain MRI of the pelvis to look for sacroiliitis or signs of inflammation. Based on results, will determine next steps in therapy.  Patient has been intolerant to many treatment in the past, we will need to consider retrial of medications if concerns for inflammatory back pain.  Orders:    Ambulatory Referral to Physical Therapy; Future    MRI pelvis w wo contrast; Future

## 2024-10-07 NOTE — TELEPHONE ENCOUNTER
[facility] calling to confirm Aciphex Rx is replacing Nexium. Advise yes per today's symptom call. No further concerns at this time.

## 2024-10-10 ENCOUNTER — APPOINTMENT (EMERGENCY)
Dept: CT IMAGING | Facility: HOSPITAL | Age: 34
End: 2024-10-10
Payer: COMMERCIAL

## 2024-10-10 ENCOUNTER — APPOINTMENT (EMERGENCY)
Dept: ULTRASOUND IMAGING | Facility: HOSPITAL | Age: 34
End: 2024-10-10
Payer: COMMERCIAL

## 2024-10-10 ENCOUNTER — TELEPHONE (OUTPATIENT)
Dept: GASTROENTEROLOGY | Facility: MEDICAL CENTER | Age: 34
End: 2024-10-10

## 2024-10-10 ENCOUNTER — HOSPITAL ENCOUNTER (EMERGENCY)
Facility: HOSPITAL | Age: 34
Discharge: HOME | End: 2024-10-10
Attending: EMERGENCY MEDICINE
Payer: COMMERCIAL

## 2024-10-10 VITALS
DIASTOLIC BLOOD PRESSURE: 84 MMHG | TEMPERATURE: 97.6 F | RESPIRATION RATE: 18 BRPM | OXYGEN SATURATION: 97 % | SYSTOLIC BLOOD PRESSURE: 137 MMHG | HEART RATE: 85 BPM

## 2024-10-10 DIAGNOSIS — R10.13 EPIGASTRIC PAIN: ICD-10-CM

## 2024-10-10 DIAGNOSIS — R10.9 ABDOMINAL PAIN: Primary | ICD-10-CM

## 2024-10-10 LAB
ALBUMIN SERPL BCG-MCNC: 3.6 G/DL (ref 3.5–5)
ALP SERPL-CCNC: 56 U/L (ref 34–104)
ALT SERPL W P-5'-P-CCNC: 13 U/L (ref 7–52)
ANION GAP SERPL CALCULATED.3IONS-SCNC: 10 MMOL/L (ref 4–13)
AST SERPL W P-5'-P-CCNC: 10 U/L (ref 13–39)
BACTERIA UR QL AUTO: ABNORMAL /HPF
BASOPHILS # BLD AUTO: 0.03 THOUSANDS/ΜL (ref 0–0.1)
BASOPHILS NFR BLD AUTO: 0 % (ref 0–1)
BILIRUB SERPL-MCNC: 0.31 MG/DL (ref 0.2–1)
BILIRUB UR QL STRIP: NEGATIVE
BUN SERPL-MCNC: 9 MG/DL (ref 5–25)
CALCIUM SERPL-MCNC: 8.9 MG/DL (ref 8.4–10.2)
CHLORIDE SERPL-SCNC: 106 MMOL/L (ref 96–108)
CLARITY UR: CLEAR
CO2 SERPL-SCNC: 20 MMOL/L (ref 21–32)
COLOR UR: YELLOW
CREAT SERPL-MCNC: 0.53 MG/DL (ref 0.6–1.3)
EOSINOPHIL # BLD AUTO: 0.29 THOUSAND/ΜL (ref 0–0.61)
EOSINOPHIL NFR BLD AUTO: 4 % (ref 0–6)
ERYTHROCYTE [DISTWIDTH] IN BLOOD BY AUTOMATED COUNT: 13.6 % (ref 11.6–15.1)
EXT PREGNANCY TEST URINE: NEGATIVE
EXT. CONTROL: NORMAL
GFR SERPL CREATININE-BSD FRML MDRD: 125 ML/MIN/1.73SQ M
GLUCOSE SERPL-MCNC: 103 MG/DL (ref 65–140)
GLUCOSE UR STRIP-MCNC: NEGATIVE MG/DL
HCT VFR BLD AUTO: 36.4 % (ref 34.8–46.1)
HGB BLD-MCNC: 11.8 G/DL (ref 11.5–15.4)
HGB UR QL STRIP.AUTO: NEGATIVE
IMM GRANULOCYTES # BLD AUTO: 0.01 THOUSAND/UL (ref 0–0.2)
IMM GRANULOCYTES NFR BLD AUTO: 0 % (ref 0–2)
KETONES UR STRIP-MCNC: NEGATIVE MG/DL
LEUKOCYTE ESTERASE UR QL STRIP: ABNORMAL
LIPASE SERPL-CCNC: 34 U/L (ref 11–82)
LYMPHOCYTES # BLD AUTO: 3.42 THOUSANDS/ΜL (ref 0.6–4.47)
LYMPHOCYTES NFR BLD AUTO: 41 % (ref 14–44)
MAGNESIUM SERPL-MCNC: 1.9 MG/DL (ref 1.9–2.7)
MCH RBC QN AUTO: 28 PG (ref 26.8–34.3)
MCHC RBC AUTO-ENTMCNC: 32.4 G/DL (ref 31.4–37.4)
MCV RBC AUTO: 87 FL (ref 82–98)
MONOCYTES # BLD AUTO: 0.7 THOUSAND/ΜL (ref 0.17–1.22)
MONOCYTES NFR BLD AUTO: 8 % (ref 4–12)
NEUTROPHILS # BLD AUTO: 3.85 THOUSANDS/ΜL (ref 1.85–7.62)
NEUTS SEG NFR BLD AUTO: 47 % (ref 43–75)
NITRITE UR QL STRIP: NEGATIVE
NON-SQ EPI CELLS URNS QL MICRO: ABNORMAL /HPF
NRBC BLD AUTO-RTO: 0 /100 WBCS
PH UR STRIP.AUTO: 6.5 [PH]
PLATELET # BLD AUTO: 447 THOUSANDS/UL (ref 149–390)
PMV BLD AUTO: 8.9 FL (ref 8.9–12.7)
POTASSIUM SERPL-SCNC: 3.6 MMOL/L (ref 3.5–5.3)
PROT SERPL-MCNC: 6.6 G/DL (ref 6.4–8.4)
PROT UR STRIP-MCNC: NEGATIVE MG/DL
RBC # BLD AUTO: 4.21 MILLION/UL (ref 3.81–5.12)
RBC #/AREA URNS AUTO: ABNORMAL /HPF
SODIUM SERPL-SCNC: 136 MMOL/L (ref 135–147)
SP GR UR STRIP.AUTO: 1.02
UROBILINOGEN UR QL STRIP.AUTO: 0.2 E.U./DL
WBC # BLD AUTO: 8.3 THOUSAND/UL (ref 4.31–10.16)
WBC #/AREA URNS AUTO: ABNORMAL /HPF

## 2024-10-10 PROCEDURE — 99244 OFF/OP CNSLTJ NEW/EST MOD 40: CPT | Performed by: SURGERY

## 2024-10-10 PROCEDURE — 85025 COMPLETE CBC W/AUTO DIFF WBC: CPT | Performed by: EMERGENCY MEDICINE

## 2024-10-10 PROCEDURE — 83690 ASSAY OF LIPASE: CPT | Performed by: EMERGENCY MEDICINE

## 2024-10-10 PROCEDURE — 87147 CULTURE TYPE IMMUNOLOGIC: CPT | Performed by: EMERGENCY MEDICINE

## 2024-10-10 PROCEDURE — 87086 URINE CULTURE/COLONY COUNT: CPT | Performed by: EMERGENCY MEDICINE

## 2024-10-10 PROCEDURE — 99284 EMERGENCY DEPT VISIT MOD MDM: CPT

## 2024-10-10 PROCEDURE — 81025 URINE PREGNANCY TEST: CPT | Performed by: EMERGENCY MEDICINE

## 2024-10-10 PROCEDURE — 81001 URINALYSIS AUTO W/SCOPE: CPT | Performed by: EMERGENCY MEDICINE

## 2024-10-10 PROCEDURE — 76705 ECHO EXAM OF ABDOMEN: CPT

## 2024-10-10 PROCEDURE — 74177 CT ABD & PELVIS W/CONTRAST: CPT

## 2024-10-10 PROCEDURE — 96374 THER/PROPH/DIAG INJ IV PUSH: CPT

## 2024-10-10 PROCEDURE — 81003 URINALYSIS AUTO W/O SCOPE: CPT | Performed by: EMERGENCY MEDICINE

## 2024-10-10 PROCEDURE — 83735 ASSAY OF MAGNESIUM: CPT | Performed by: EMERGENCY MEDICINE

## 2024-10-10 PROCEDURE — 80053 COMPREHEN METABOLIC PANEL: CPT | Performed by: EMERGENCY MEDICINE

## 2024-10-10 PROCEDURE — 36415 COLL VENOUS BLD VENIPUNCTURE: CPT | Performed by: EMERGENCY MEDICINE

## 2024-10-10 PROCEDURE — 99285 EMERGENCY DEPT VISIT HI MDM: CPT | Performed by: EMERGENCY MEDICINE

## 2024-10-10 RX ORDER — LIDOCAINE HYDROCHLORIDE 20 MG/ML
15 SOLUTION OROPHARYNGEAL ONCE
Status: COMPLETED | OUTPATIENT
Start: 2024-10-10 | End: 2024-10-10

## 2024-10-10 RX ORDER — MAGNESIUM HYDROXIDE/ALUMINUM HYDROXICE/SIMETHICONE 120; 1200; 1200 MG/30ML; MG/30ML; MG/30ML
30 SUSPENSION ORAL ONCE
Status: COMPLETED | OUTPATIENT
Start: 2024-10-10 | End: 2024-10-10

## 2024-10-10 RX ORDER — KETOROLAC TROMETHAMINE 30 MG/ML
15 INJECTION, SOLUTION INTRAMUSCULAR; INTRAVENOUS ONCE
Status: COMPLETED | OUTPATIENT
Start: 2024-10-10 | End: 2024-10-10

## 2024-10-10 RX ADMIN — KETOROLAC TROMETHAMINE 15 MG: 30 INJECTION, SOLUTION INTRAMUSCULAR at 06:52

## 2024-10-10 RX ADMIN — IOHEXOL 100 ML: 350 INJECTION, SOLUTION INTRAVENOUS at 10:22

## 2024-10-10 RX ADMIN — ALUMINUM HYDROXIDE, MAGNESIUM HYDROXIDE, AND DIMETHICONE 30 ML: 200; 20; 200 SUSPENSION ORAL at 09:34

## 2024-10-10 RX ADMIN — Medication 15 ML: at 09:34

## 2024-10-10 NOTE — CONSULTS
Consultation - Surgery-General   Name: Cruz Schneider 34 y.o. female I MRN: [id number]  Unit/Bed#: ED 27 I Date of Admission: 10/10/2024   Date of Service: 10/10/2024 I Hospital Day: 0   Consult to Surgery - General  Consult performed by: Kulwinder Nunn MD  Consult ordered by: Aditya Mandel Jr., DO        Physician Requesting Evaluation: No att. providers found   Reason for Evaluation / Principal Problem: Epigastric abdominal pain and history of biliary dyskinesia    Assessment & Plan    I have discussed the above management plan in detail with the primary service.     History of Present Illness   Cruz Schneider is a 34 y.o. female who presents with epigastric and left upper quadrant abdominal pain.    Patient reports history of fibromyalgia, irritable bowel syndrome and arthritis.    Patient denies a significant past surgical history.    Patient does have allergies to various medications documented in the computer record.    Patient is on Friday of medications but no blood thinners.    On physical examination she is well-appearing.  Pleasant competent reliable as a historian.  She has mild epigastric and left upper quadrant abdominal tenderness to percussion and palpation.  No true guarding rebound or peritoneal signs.    The imaging studies discussed with the patient and her mother.    Patient has radiographic findings to support the diagnosis of biliary dyskinesia.    I have advised that she proceed with her upper endoscopy and colonoscopy.  If this is a nondiagnostic study that she follow-up with our office to discuss indications for laparoscopic cholecystectomy for the definitive treatment of her biliary dyskinesia.    All questions answered to the satisfaction of the patient was in agreement with the treatment plan outlined above.    Review of Systems   Constitutional:  Negative for chills and fever.   HENT:  Negative for ear pain and sore throat.    Eyes:  Negative for pain and visual  disturbance.   Respiratory:  Negative for cough and shortness of breath.    Cardiovascular:  Negative for chest pain and palpitations.   Gastrointestinal:  Positive for abdominal pain. Negative for vomiting.   Genitourinary:  Negative for dysuria and hematuria.   Musculoskeletal:  Negative for arthralgias and back pain.   Skin:  Negative for color change and rash.   Neurological:  Negative for seizures and syncope.   All other systems reviewed and are negative.    I have reviewed the patient's PMH, PSH, Social History, Family History, Meds, and Allergies    Objective :  Temp:  [97.6 °F (36.4 °C)] 97.6 °F (36.4 °C)  HR:  [85] 85  BP: (137)/(84) 137/84  Resp:  [18] 18  SpO2:  [97 %] 97 %  O2 Device: None (Room air)      Physical Exam  Vitals and nursing note reviewed.   Constitutional:       General: She is not in acute distress.     Appearance: She is well-developed.   HENT:      Head: Normocephalic and atraumatic.   Eyes:      Conjunctiva/sclera: Conjunctivae normal.   Cardiovascular:      Rate and Rhythm: Normal rate and regular rhythm.      Heart sounds: No murmur heard.  Pulmonary:      Effort: Pulmonary effort is normal. No respiratory distress.      Breath sounds: Normal breath sounds.   Abdominal:      Palpations: Abdomen is soft.      Tenderness: There is abdominal tenderness.      Comments: Mild epigastric and left upper quadrant abdominal pain to percussion and palpation.  No true guarding rebound or peritoneal signs.  No right upper quadrant pain.  Negative Segura sign.   Musculoskeletal:         General: No swelling.      Cervical back: Neck supple.   Skin:     General: Skin is warm and dry.      Capillary Refill: Capillary refill takes less than 2 seconds.   Neurological:      Mental Status: She is alert.   Psychiatric:         Mood and Affect: Mood normal.         Lab Results: I have reviewed the following results:  Recent Labs     10/10/24  0641   WBC 8.30   HGB 11.8   HCT 36.4   *   SODIUM 136    K 3.6      CO2 20*   BUN 9   CREATININE 0.53*   GLUC 103   MG 1.9   AST 10*   ALT 13   ALB 3.6   TBILI 0.31   ALKPHOS 56       Imaging Results Review: I personally reviewed the following image studies/reports in PACS and discussed pertinent findings with Radiology: Ultrasound(s). My interpretation of the radiology images/reports is: Negative for cholelithiasis or signs of acute cholecystitis.  Other Study Results Review: No additional pertinent studies reviewed.    VTE Pharmacologic Prophylaxis: Heparin  VTE Mechanical Prophylaxis: sequential compression device    Administrative Statements   I have spent a total time of 15 minutes in caring for this patient on the day of the visit/encounter including Prognosis.

## 2024-10-10 NOTE — ED CARE HANDOFF
Emergency Department Sign Out Note        Sign out and transfer of care from Dr. Tamez. See Separate Emergency Department note.     The patient, Cruz Schneider, was evaluated by the previous provider for upper abdominal pain.  Patient has been dealing with pressure sharp and burning pain in the upper abdomen.  The patient notes it originally started in the mid to right abdomen but now is more left upper abdominal in nature.    Workup Completed:  Patient with recent HIDA scan suggesting gallbladder dysfunction.  Ultrasound done of the upper abdomen shows slight gallbladder wall thickening but this is thought to be due to gallbladder contraction.  The patient has no evidence of gallstones noted or pericholecystic fluid appreciated on evaluation.    On exam, the patient has no evidence of tachycardia with a heart rate in the 80s.  Skin is without rash.  Abdomen shows epigastric tenderness to palpation without guarding rigidity or rebound.    Bowel sounds are present.    Patient is following GI and actually will be getting a endoscopy in the near future.  Patient will get a GI cocktail to see if that improves her symptomatology.    ED Course / Workup Pending (followup):  Patient currently pending ultrasound                                  ED Course as of 10/10/24 1124   Thu Oct 10, 2024   0708 Patient with upper abdominal pain and possible gallbladder disease.  Ultrasound pending   1012 No relief with GI cocktail.   1012 GI called to schedule the patient's endoscopy and it is on Monday.   1120 Case discussed with Dr. Nunn who believes that the patient's biliary dyskinesia may be playing a role in her symptoms however she needs to have her endoscopy done first on Monday to make sure there are no other etiologies of the pain.  If that is found to be negative, then she may follow-up with Dr. Nunn for cholecystectomy.     Procedures  Medical Decision Making  34-year-old female presented to the emergency  department complaining of upper abdominal pain that originally was right upper quadrant but extended across the epigastrium to the left side.  Patient denies any fever or chills but admits to nausea.  The patient has a history of biliary dyskinesia as diagnosed by recent HIDA scan.  The patient's ultrasound does show some thickening of the gallbladder wall but it is thought to be due to contraction more than anything else.  The patient was given a GI cocktail which did not help the patient's symptoms.  Because of that a CT scan was ordered which was also nonacute.  Being that the patient was having ongoing symptoms and abnormal gallbladder function, surgery was consulted who evaluated the patient at the bedside.  They recommend that the patient get her endoscopy done Monday which will be at EGD and colonoscopy, and if the cause of her symptoms is not determined by endoscopy, they will offer her elective cholecystectomy.  Patient will be discharged to home with recommendations to continue current treatment as well as add Maalox,.  The patient is to return to the ER for any new or concerning issues.    Amount and/or Complexity of Data Reviewed  Labs: ordered.  Radiology: ordered.    Risk  OTC drugs.  Prescription drug management.            Disposition  Final diagnoses:   Abdominal pain   Epigastric pain     Time reflects when diagnosis was documented in both MDM as applicable and the Disposition within this note       Time User Action Codes Description Comment    10/10/2024 11:20 AM Aditya Mandel Add [R10.9] Abdominal pain     10/10/2024 11:21 AM Aditya Mandel Add [R10.13] Epigastric pain           ED Disposition       ED Disposition   Discharge    Condition   Stable    Date/Time   Thu Oct 10, 2024 11:21 AM    Comment   Cruz Schneider discharge to home/self care.                   Follow-up Information       Follow up With Specialties Details Why Contact Info    Kulwinder Nunn MD General Surgery On  10/14/2024  [address] [place] [id number]  [phone]            Patient's Medications   Discharge Prescriptions    No medications on file     No discharge procedures on file.       ED Provider  Electronically Signed by     Aditya Mandel Jr., DO  10/10/24 1124

## 2024-10-10 NOTE — ED PROVIDER NOTES
"Final diagnoses:   None     ED Disposition       None          Assessment & Plan       Medical Decision Making  34-year-old female presents with right upper, epigastric, left upper quadrant pain which started yesterday evening while at rest.  She has tenderness in those locations.  She says this feels similar to her \"gallbladder pain\".  She had a recent hida scan which showed decreased gb function.    Will get CBC look for leukocytosis, anemia. Urine preg/ua to look for infection or ectopic (doubt). CMP assess hepatobiliary laboratory values. Lipase to look for pancreatitis given distribution of pain.    No diarrhea or findings to suggest appendicitis, diverticulitis.    Discussed CT vs US with patient, she agrees in deferring CT and obtaining GB US to r/o cholecystitis    Amount and/or Complexity of Data Reviewed  Labs: ordered. Decision-making details documented in ED Course.  Radiology: ordered and independent interpretation performed. Decision-making details documented in ED Course.    Risk  Prescription drug management.        ED Course as of 10/10/24 0714   Thu Oct 10, 2024   0713 Signed out to Dr Mandel pending US       Medications   ketorolac (TORADOL) injection 15 mg (15 mg Intravenous Given 10/10/24 0652)       ED Risk Strat Scores                           SBIRT 22yo+      Flowsheet Row Most Recent Value   Initial Alcohol Screen: US AUDIT-C     1. How often do you have a drink containing alcohol? 0 Filed at: 10/10/2024 0646   2. How many drinks containing alcohol do you have on a typical day you are drinking?  0 Filed at: 10/10/2024 0646   3a. Male UNDER 65: How often do you have five or more drinks on one occasion? 0 Filed at: 10/10/2024 0646   3b. FEMALE Any Age, or MALE 65+: How often do you have 4 or more drinks on one occassion? 0 Filed at: 10/10/2024 0646   Audit-C Score 0 Filed at: 10/10/2024 0646   BRUCE: How many times in the past year have you...    Used an illegal drug or used a prescription " medication for non-medical reasons? Never Filed at: 10/10/2024 0646                            History of Present Illness       Chief Complaint   Patient presents with    Abdominal Pain     Pt reports right sided abdominal pain that radiates to the left since yesterday. Pt notes recent issues with her gallbladder       Past Medical History:   Diagnosis Date    Abnormal Pap smear of cervix     Allergic     Anxiety     Arthritis     Dental caries     Depression     Fibromyalgia, primary     GERD (gastroesophageal reflux disease)     Hypertension     IBS (irritable bowel syndrome)     Migraine     Obesity     Vitamin B12 deficiency       Past Surgical History:   Procedure Laterality Date    COLONOSCOPY N/A 5/8/2018    Procedure: COLONOSCOPY;  Surgeon: Stephanie Alston MD;  Location: [facility] GI LAB;  Service: Gastroenterology    KS EXC B9 LESION MRGN XCP SK TG S/N/H/F/G > 4.0CM N/A 7/1/2021    Procedure: EXCISION OF PILAR SCALP CYST X 2 AND RIGHT INNER GROIN NEVVUS;  Surgeon: Denis Barron MD;  Location:  MAIN OR;  Service: Plastics    KS REPAIR COMPLEX SCALP/ARM/LEG 2.6-7.5 CM N/A 7/1/2021    Procedure: CLOSURE WOUND SCALP X 2 AND RIGHT INNER GROIN;  Surgeon: Denis Barron MD;  Location:  MAIN OR;  Service: Plastics    TONSILLECTOMY      WISDOM TOOTH EXTRACTION        Family History   Problem Relation Age of Onset    Rheum arthritis Mother     Psoriasis Mother     Other Mother     Hypertension Mother     Diabetes unspecified Mother     Sjogren's syndrome Mother     Alcohol abuse Mother     Drug abuse Mother     Anxiety disorder Father     Alcohol abuse Father     Lung cancer Maternal Grandfather     Cancer Other         bone    Diabetes Other     Other Other         High blood pressure    Depression Maternal Grandmother       Social History     Tobacco Use    Smoking status: Never     Passive exposure: Past    Smokeless tobacco: Never   Vaping Use    Vaping status: Never Used   Substance Use Topics    Alcohol  use: Not Currently     Comment: rarely    Drug use: Yes     Frequency: 2.0 times per week     Types: Marijuana     Comment: medical marijuana (vape)      E-Cigarette/Vaping    E-Cigarette Use Never User     Start Date 7/1/19     Comments medical marijuana       E-Cigarette/Vaping Substances    Nicotine No     THC No     CBD No     Flavoring No     Other No     Unknown No       I have reviewed and agree with the history as documented.     34F with LUQ and epigastric pain which started in the RUQ.      Abdominal Pain      Review of Systems   Gastrointestinal:  Positive for abdominal pain.           Objective       ED Triage Vitals [10/10/24 0611]   Temperature Pulse Blood Pressure Respirations SpO2 Patient Position - Orthostatic VS   97.6 °F (36.4 °C) 85 137/84 18 97 % --      Temp Source Heart Rate Source BP Location FiO2 (%) Pain Score    Temporal Monitor Left arm -- 7      Vitals      Date and Time Temp Pulse SpO2 Resp BP Pain Score FACES Pain Rating User   10/10/24 0655 -- -- -- -- -- 7 -- RC   10/10/24 0611 97.6 °F (36.4 °C) 85 97 % 18 137/84 7 -- MD            Physical Exam  Vitals and nursing note reviewed.   Constitutional:       Appearance: Normal appearance. She is well-developed.   HENT:      Head: Normocephalic and atraumatic.   Eyes:      Conjunctiva/sclera: Conjunctivae normal.      Pupils: Pupils are equal, round, and reactive to light.   Neck:      Trachea: No tracheal deviation.   Cardiovascular:      Rate and Rhythm: Normal rate and regular rhythm.      Heart sounds: Normal heart sounds. No murmur heard.  Pulmonary:      Effort: Pulmonary effort is normal. No respiratory distress.      Breath sounds: Normal breath sounds. No wheezing or rales.   Abdominal:      General: There is no distension.      Palpations: Abdomen is soft.      Tenderness: There is generalized abdominal tenderness and tenderness in the right upper quadrant.   Musculoskeletal:         General: No deformity.      Cervical back:  Normal range of motion and neck supple.   Skin:     General: Skin is warm and dry.      Capillary Refill: Capillary refill takes less than 2 seconds.   Neurological:      General: No focal deficit present.      Mental Status: She is alert and oriented to person, place, and time.      Sensory: No sensory deficit.   Psychiatric:         Mood and Affect: Mood normal.         Judgment: Judgment normal.         Results Reviewed       Procedure Component Value Units Date/Time    Comprehensive metabolic panel [[id number]]  (Abnormal) Collected: 10/10/24 0641    Lab Status: Final result Specimen: Blood from Arm, Left Updated: 10/10/24 0706     Sodium 136 mmol/L      Potassium 3.6 mmol/L      Chloride 106 mmol/L      CO2 20 mmol/L      ANION GAP 10 mmol/L      BUN 9 mg/dL      Creatinine 0.53 mg/dL      Glucose 103 mg/dL      Calcium 8.9 mg/dL      AST 10 U/L      ALT 13 U/L      Alkaline Phosphatase 56 U/L      Total Protein 6.6 g/dL      Albumin 3.6 g/dL      Total Bilirubin 0.31 mg/dL      eGFR 125 ml/min/1.73sq m     Narrative:      National Kidney Disease Foundation guidelines for Chronic Kidney Disease (CKD):     Stage 1 with normal or high GFR (GFR > 90 mL/min/1.73 square meters)    Stage 2 Mild CKD (GFR = 60-89 mL/min/1.73 square meters)    Stage 3A Moderate CKD (GFR = 45-59 mL/min/1.73 square meters)    Stage 3B Moderate CKD (GFR = 30-44 mL/min/1.73 square meters)    Stage 4 Severe CKD (GFR = 15-29 mL/min/1.73 square meters)    Stage 5 End Stage CKD (GFR <15 mL/min/1.73 square meters)  Note: GFR calculation is accurate only with a steady state creatinine    Magnesium [[id number]]  (Normal) Collected: 10/10/24 0641    Lab Status: Final result Specimen: Blood from Arm, Left Updated: 10/10/24 0706     Magnesium 1.9 mg/dL     Lipase [[id number]]  (Normal) Collected: 10/10/24 0641    Lab Status: Final result Specimen: Blood from Arm, Left Updated: 10/10/24 0706     Lipase 34 u/L     Urine Microscopic [[id number]]  (Abnormal)  Collected: 10/10/24 0628    Lab Status: Final result Specimen: Urine, Clean Catch Updated: 10/10/24 0701     RBC, UA 1-2 /hpf      WBC, UA 10-20 /hpf      Epithelial Cells Moderate /hpf      Bacteria, UA Occasional /hpf     Urine culture [[id number]] Collected: 10/10/24 0628    Lab Status: In process Specimen: Urine, Clean Catch Updated: 10/10/24 0701    UA w Reflex to Microscopic w Reflex to Culture [[id number]]  (Abnormal) Collected: 10/10/24 0628    Lab Status: Final result Specimen: Urine, Clean Catch Updated: 10/10/24 0652     Color, UA Yellow     Clarity, UA Clear     Specific Gravity, UA 1.020     pH, UA 6.5     Leukocytes, UA 1+     Nitrite, UA Negative     Protein, UA Negative mg/dl      Glucose, UA Negative mg/dl      Ketones, UA Negative mg/dl      Urobilinogen, UA 0.2 E.U./dl      Bilirubin, UA Negative     Occult Blood, UA Negative    CBC and differential [[id number]]  (Abnormal) Collected: 10/10/24 0641    Lab Status: Final result Specimen: Blood from Arm, Left Updated: 10/10/24 0648     WBC 8.30 Thousand/uL      RBC 4.21 Million/uL      Hemoglobin 11.8 g/dL      Hematocrit 36.4 %      MCV 87 fL      MCH 28.0 pg      MCHC 32.4 g/dL      RDW 13.6 %      MPV 8.9 fL      Platelets 447 Thousands/uL      nRBC 0 /100 WBCs      Segmented % 47 %      Immature Grans % 0 %      Lymphocytes % 41 %      Monocytes % 8 %      Eosinophils Relative 4 %      Basophils Relative 0 %      Absolute Neutrophils 3.85 Thousands/µL      Absolute Immature Grans 0.01 Thousand/uL      Absolute Lymphocytes 3.42 Thousands/µL      Absolute Monocytes 0.70 Thousand/µL      Eosinophils Absolute 0.29 Thousand/µL      Basophils Absolute 0.03 Thousands/µL     POCT pregnancy, urine [[id number]]  (Normal) Resulted: 10/10/24 0636    Lab Status: Final result Updated: 10/10/24 0636     EXT Preg Test, Ur Negative     Control Valid            US right upper quadrant    (Results Pending)       Procedures    ED Medication and Procedure Management    Prior to Admission Medications   Prescriptions Last Dose Informant Patient Reported? Taking?   ALPRAZolam (XANAX) 0.5 mg tablet   No No   Sig: Take 1 tablet (0.5 mg total) by mouth 3 (three) times a day as needed for anxiety   Botox 200 units SOLR   Yes No   Cholecalciferol (Vitamin D3) 50 MCG ( UT) TABS   Yes No   Patient not taking: Reported on 10/7/2024   Cholecalciferol (Vitamin D3) 50 MCG ( UT) capsule   No No   Sig: Take 1 capsule (2,000 Units total) by mouth daily   Cyanocobalamin (Vitamin B-12) 500 MCG SUBL   No No   Sig: Place 1 tablet (500 mcg total) under the tongue in the morning   FeroSul 325 (65 Fe) MG tablet   No No   Sig: Take 1 tablet (325 mg total) by mouth in the morning   Galcanezumab-gnlm (Emgality) 120 MG/ML SOAJ   No No   Sig: One ml subcutaneous on the right thigh and 1 ml subcutaneous on the left thigh at the same time for 1 dose   Galcanezumab-gnlm 120 MG/ML SOAJ   No No   Sig: Inject 120 mg under the skin every 30 (thirty) days   OLANZapine (ZyPREXA) 7.5 mg tablet   No No   Sig: Take 1 tablet (7.5 mg total) by mouth daily at bedtime Do not take with reglan.   RABEprazole (ACIPHEX) 20 MG tablet   No No   Sig: Take 1 tablet (20 mg total) by mouth 2 (two) times a day   SODIUM FLUORIDE, DENTAL GEL, 1.1 % GEL   No No   Sig: After brushing thoroughly with toothpaste, rinse as usual. Apply a thin ribbon of gel to the teeth with a toothbrush  once daily for at least one minute, preferably at bedtime.After use, expectorate gel. For best results, do not eat, drink or rinse for 30 minutes.   Semaglutide-Weight Management (Wegovy) 0.5 MG/0.5ML   No No   Sig: Inject 0.5 mg under the skin weekly   Trudhesa 0.725 MG/ACT AERS   No No   Si spray in each nostril for total dose of 1.45mg, may repeat 1 dose after 1 hour. Max 2 doses per 24 hours and 3 doses per week.   ascorbic acid (VITAMIN C) 500 MG tablet   Yes No   Sig: Take 500 mg by mouth daily   buPROPion (WELLBUTRIN XL) 300 mg 24 hr  tablet   No No   Sig: Take 1 tablet (300 mg total) by mouth daily   cyanocobalamin 1,000 mcg/mL   No No   Si IM injection every month   dicyclomine (BENTYL) 10 mg capsule   No No   Sig: Take 2 capsules (20 mg total) by mouth 3 (three) times a day before meals   diphenoxylate-atropine (LOMOTIL) 2.5-0.025 mg per tablet   No No   Sig: Take 1 tablet by mouth 4 (four) times a day as needed for diarrhea   escitalopram (LEXAPRO) 20 mg tablet   No No   Sig: Take 1 tablet (20 mg total) by mouth daily   esomeprazole (NexIUM) 40 MG capsule   No No   Sig: Take 1 capsule (40 mg total) by mouth daily   etonogestrel-ethinyl estradiol (NuvaRing) 0.12-0.015 MG/24HR vaginal ring   No No   Sig: Insert ring for 21 days then remove for one week.   gabapentin (NEURONTIN) 300 mg capsule   No No   Sig: Take 1 capsule (300 mg total) by mouth 3 (three) times a day   indomethacin (INDOCIN) 25 mg capsule   No No   Si-2 tabs BID prn severe headache with food. Hold toradol.   loperamide (IMODIUM) 2 mg capsule   No No   Sig: Take 1 capsule (2 mg total) by mouth 4 (four) times a day as needed for diarrhea   magnesium Oxide (MAG-OX) 400 mg TABS   No No   Sig: Take 1 tablet (400 mg total) by mouth daily   methocarbamol (ROBAXIN) 500 mg tablet   No No   Sig: Take 1 tablet (500 mg total) by mouth 3 (three) times a day   metoprolol succinate (TOPROL-XL) 25 mg 24 hr tablet   Yes No   nystatin (MYCOSTATIN) cream   No No   Sig: Apply topically 2 (two) times a day   onabotulinumtoxin A (BOTOX) 100 units  Self Yes No   Sig: Inject as directed   ondansetron (ZOFRAN) 4 mg tablet   No No   Sig: Take 1 tablet (4 mg total) by mouth every 6 (six) hours   phentermine (ADIPEX-P) 37.5 MG tablet   No No   Sig: Take 1 tablet (37.5 mg total) by mouth in the morning   Patient not taking: Reported on 2024   polyethylene glycol (Golytely) 4000 mL solution   No No   Sig: Take 4,000 mL by mouth once for 1 dose Take 4000 mL by mouth once for 1 dose. Use as  directed   pramipexole (MIRAPEX) 0.25 mg tablet   Yes No   prazosin (MINIPRESS) 2 mg capsule   No No   Sig: Take 1 capsule (2 mg total) by mouth daily at bedtime   predniSONE 10 mg tablet   No No   Si pills daily for 3 days, 3 for 3 days, 2 for 3 days, 1 for 3 days.   Patient not taking: Reported on 9/10/2024      Facility-Administered Medications Last Administration Doses Remaining   cyanocobalamin injection 1,000 mcg 3/14/2024  2:50 PM         Patient's Medications   Discharge Prescriptions    No medications on file     No discharge procedures on file.  ED SEPSIS DOCUMENTATION            Oni Tamez,   10/10/24 0714

## 2024-10-10 NOTE — TELEPHONE ENCOUNTER
Scheduled date of colonoscopy (as of today): 10/14   Physician performing colonoscopy: DO Ceci   Location of colonoscopy: [facility]   Bowel prep reviewed with patient:  GOLYTELY   Instructions reviewed with patient by: PATIENCE  Clearances: pravin DAY HOLD

## 2024-10-10 NOTE — TELEPHONE ENCOUNTER
Left voicemail and requested call back     Called patient to schedule procedure (Colonoscopy,Endoscopy), asked patient to please give us a call to schedule.      Appointment (Colonoscopy/EGD)  (Newest Message First)  View All Conversations on this Encounter  Berta Uriarte RN routed conversation to [facility]2 days ago     Berta Uriarte RN2 days ago       PT CALLING TO SCHEDULE EGD/COLONOSCOPY.          Note        Stephanie Schneider [phone]  Berta Uriarte RN2 days ago     Alice Conroy RN  [facility]3 days ago     JG  Thanks!     Alice Conroy RN3 days ago     JG  Patient calling to get scheduled for colonoscopy/egd.  Patient stated that provider said someone would call today.  Explained that she would be getting call in the next 2 days to schedule.  Patient stated understanding.           Note        Stephanie Schneider [phone]  Alice Conroy RN3 days ago     Disposition    Information or Advice Only Call     Recent Patient Communication     Last Update Description Specialty     Today Schedule Procedure Gastroenterology     [facility] Open     2 days ago Appointment (Colonoscopy/EGD) Gastroenterology     [facility] Conroy Closed     2 days ago -- Rheumatology     [facility] Closed     3 days ago Symptoms Gastroenterology     [facility] Open     3 days ago Rx Change Gastroenterology     [facility] Fabiola [facility]aza Closed    Break-the-Glass     Some communications exist for restricted encounters and are hidden.  Access patient information      There are additional recent communications with this patient. View the rest in Chart Review.

## 2024-10-10 NOTE — DISCHARGE INSTRUCTIONS
Stay away from fatty or spicy foods over the next few days.  Other instructions per Dr. Nunn.  He has recommended that you get your scopes done on Monday and if the cause of your symptoms or not found on scope, you can follow-up to consider getting her gallbladder removed.  I would return for fever passing blood or intractable pain.  You may want to still consider utilizing Maalox as well as your antiacid medication over the next few days.    Return to the ER for any new, concerning, worsening issues.

## 2024-10-11 ENCOUNTER — OFFICE VISIT (OUTPATIENT)
Age: 34
End: 2024-10-11
Payer: COMMERCIAL

## 2024-10-11 VITALS
DIASTOLIC BLOOD PRESSURE: 82 MMHG | HEIGHT: 63 IN | WEIGHT: 244.4 LBS | BODY MASS INDEX: 43.3 KG/M2 | SYSTOLIC BLOOD PRESSURE: 128 MMHG | RESPIRATION RATE: 16 BRPM | HEART RATE: 86 BPM | OXYGEN SATURATION: 98 % | TEMPERATURE: 97.8 F

## 2024-10-11 DIAGNOSIS — B95.1 GROUP B STREPTOCOCCAL UTI: Primary | ICD-10-CM

## 2024-10-11 DIAGNOSIS — N39.0 GROUP B STREPTOCOCCAL UTI: Primary | ICD-10-CM

## 2024-10-11 LAB — BACTERIA UR CULT: ABNORMAL

## 2024-10-11 PROCEDURE — 99213 OFFICE O/P EST LOW 20 MIN: CPT | Performed by: STUDENT IN AN ORGANIZED HEALTH CARE EDUCATION/TRAINING PROGRAM

## 2024-10-11 RX ORDER — AMOXICILLIN 500 MG/1
500 TABLET, FILM COATED ORAL 2 TIMES DAILY
Qty: 14 TABLET | Refills: 0 | Status: SHIPPED | OUTPATIENT
Start: 2024-10-11 | End: 2024-10-18

## 2024-10-11 NOTE — PROGRESS NOTES
"Ambulatory Visit  Name: Cruz Schneider      : 1990      MRN: [id number]  Encounter Provider: Benjamin Mckinnon MD  Encounter Date: 10/11/2024   Encounter department: [facility] PRIMARY CARE    Assessment & Plan  Group B streptococcal UTI    Orders:    amoxicillin (AMOXIL) 500 MG tablet; Take 1 tablet (500 mg total) by mouth 2 (two) times a day for 7 days  Positive urine culture for results from the ED yesterday.  Will treat with amoxicillin as indicated above.  Counseled patient on importance of drinking plenty of water to help resolve infection.  Patient does have upcoming colonoscopy and EGD per GI given ongoing abdominal issues.  Advised patient to follow-up with office symptoms worsen or fail to improve.     History of Present Illness     Abdominal Pain  This is a recurrent problem. The current episode started more than 1 month ago. The onset quality is sudden. The problem has been rapidly worsening. The pain is located in the LUQ and RUQ. Associated symptoms include constipation and diarrhea. Pertinent negatives include no dysuria, fever, headaches, nausea or vomiting.         Review of Systems   Constitutional:  Negative for chills and fever.   HENT:  Negative for sore throat.    Respiratory:  Negative for cough and shortness of breath.    Cardiovascular:  Negative for chest pain and palpitations.   Gastrointestinal:  Positive for abdominal pain, constipation and diarrhea. Negative for nausea and vomiting.   Genitourinary:  Negative for difficulty urinating and dysuria.   Neurological:  Negative for dizziness and headaches.           Objective     /82 (BP Location: Left arm, Patient Position: Sitting, Cuff Size: Large)   Pulse 86   Temp 97.8 °F (36.6 °C) (Temporal)   Resp 16   Ht 5' 3\" (1.6 m)   Wt 111 kg (244 lb 6.4 oz)   SpO2 98%   BMI 43.29 kg/m²     Physical Exam  Constitutional:       General: She is not in acute distress.     Appearance: Normal appearance. She is not " ill-appearing.   HENT:      Head: Normocephalic and atraumatic.   Cardiovascular:      Rate and Rhythm: Normal rate and regular rhythm.      Heart sounds: Normal heart sounds. No murmur heard.  Pulmonary:      Effort: Pulmonary effort is normal. No respiratory distress.      Breath sounds: Normal breath sounds.   Abdominal:      General: Abdomen is flat. Bowel sounds are normal. There is no distension.      Palpations: Abdomen is soft. There is no mass.      Tenderness: There is generalized abdominal tenderness and tenderness in the suprapubic area. There is no right CVA tenderness, left CVA tenderness, guarding or rebound.      Hernia: No hernia is present.   Musculoskeletal:      Right lower leg: No edema.      Left lower leg: No edema.   Neurological:      Mental Status: She is alert.

## 2024-10-13 ENCOUNTER — HOSPITAL ENCOUNTER (EMERGENCY)
Facility: HOSPITAL | Age: 34
Discharge: HOME | End: 2024-10-13
Attending: EMERGENCY MEDICINE | Admitting: EMERGENCY MEDICINE
Payer: COMMERCIAL

## 2024-10-13 VITALS
OXYGEN SATURATION: 95 % | RESPIRATION RATE: 18 BRPM | DIASTOLIC BLOOD PRESSURE: 73 MMHG | SYSTOLIC BLOOD PRESSURE: 135 MMHG | HEART RATE: 102 BPM | TEMPERATURE: 99 F

## 2024-10-13 DIAGNOSIS — R52 BODY ACHES: ICD-10-CM

## 2024-10-13 DIAGNOSIS — B34.9 VIRAL SYNDROME: ICD-10-CM

## 2024-10-13 DIAGNOSIS — R19.7 DIARRHEA: Primary | ICD-10-CM

## 2024-10-13 LAB
ALBUMIN SERPL BCG-MCNC: 4 G/DL (ref 3.5–5)
ALP SERPL-CCNC: 66 U/L (ref 34–104)
ALT SERPL W P-5'-P-CCNC: 15 U/L (ref 7–52)
ANION GAP SERPL CALCULATED.3IONS-SCNC: 9 MMOL/L (ref 4–13)
AST SERPL W P-5'-P-CCNC: 15 U/L (ref 13–39)
BASOPHILS # BLD AUTO: 0.06 THOUSANDS/ΜL (ref 0–0.1)
BASOPHILS NFR BLD AUTO: 1 % (ref 0–1)
BILIRUB SERPL-MCNC: 0.6 MG/DL (ref 0.2–1)
BILIRUB UR QL STRIP: NEGATIVE
BUN SERPL-MCNC: 7 MG/DL (ref 5–25)
CALCIUM SERPL-MCNC: 9.4 MG/DL (ref 8.4–10.2)
CHLORIDE SERPL-SCNC: 103 MMOL/L (ref 96–108)
CLARITY UR: CLEAR
CO2 SERPL-SCNC: 22 MMOL/L (ref 21–32)
COLOR UR: YELLOW
CREAT SERPL-MCNC: 0.73 MG/DL (ref 0.6–1.3)
EOSINOPHIL # BLD AUTO: 0.1 THOUSAND/ΜL (ref 0–0.61)
EOSINOPHIL NFR BLD AUTO: 1 % (ref 0–6)
ERYTHROCYTE [DISTWIDTH] IN BLOOD BY AUTOMATED COUNT: 13.5 % (ref 11.6–15.1)
FLUAV AG UPPER RESP QL IA.RAPID: NEGATIVE
FLUBV AG UPPER RESP QL IA.RAPID: NEGATIVE
GFR SERPL CREATININE-BSD FRML MDRD: 108 ML/MIN/1.73SQ M
GLUCOSE SERPL-MCNC: 98 MG/DL (ref 65–140)
GLUCOSE UR STRIP-MCNC: NEGATIVE MG/DL
HCT VFR BLD AUTO: 41.8 % (ref 34.8–46.1)
HGB BLD-MCNC: 13.7 G/DL (ref 11.5–15.4)
HGB UR QL STRIP.AUTO: NEGATIVE
IMM GRANULOCYTES # BLD AUTO: 0.02 THOUSAND/UL (ref 0–0.2)
IMM GRANULOCYTES NFR BLD AUTO: 0 % (ref 0–2)
KETONES UR STRIP-MCNC: ABNORMAL MG/DL
LEUKOCYTE ESTERASE UR QL STRIP: NEGATIVE
LIPASE SERPL-CCNC: 27 U/L (ref 11–82)
LYMPHOCYTES # BLD AUTO: 1.95 THOUSANDS/ΜL (ref 0.6–4.47)
LYMPHOCYTES NFR BLD AUTO: 18 % (ref 14–44)
MCH RBC QN AUTO: 28.1 PG (ref 26.8–34.3)
MCHC RBC AUTO-ENTMCNC: 32.8 G/DL (ref 31.4–37.4)
MCV RBC AUTO: 86 FL (ref 82–98)
MONOCYTES # BLD AUTO: 0.72 THOUSAND/ΜL (ref 0.17–1.22)
MONOCYTES NFR BLD AUTO: 7 % (ref 4–12)
NEUTROPHILS # BLD AUTO: 7.9 THOUSANDS/ΜL (ref 1.85–7.62)
NEUTS SEG NFR BLD AUTO: 73 % (ref 43–75)
NITRITE UR QL STRIP: NEGATIVE
NRBC BLD AUTO-RTO: 0 /100 WBCS
PH UR STRIP.AUTO: 6 [PH]
PLATELET # BLD AUTO: 494 THOUSANDS/UL (ref 149–390)
PMV BLD AUTO: 8.7 FL (ref 8.9–12.7)
POTASSIUM SERPL-SCNC: 3.7 MMOL/L (ref 3.5–5.3)
PROT SERPL-MCNC: 7.7 G/DL (ref 6.4–8.4)
PROT UR STRIP-MCNC: NEGATIVE MG/DL
RBC # BLD AUTO: 4.88 MILLION/UL (ref 3.81–5.12)
SARS-COV+SARS-COV-2 AG RESP QL IA.RAPID: NEGATIVE
SODIUM SERPL-SCNC: 134 MMOL/L (ref 135–147)
SP GR UR STRIP.AUTO: 1.02
UROBILINOGEN UR QL STRIP.AUTO: 0.2 E.U./DL
WBC # BLD AUTO: 10.75 THOUSAND/UL (ref 4.31–10.16)

## 2024-10-13 PROCEDURE — 85025 COMPLETE CBC W/AUTO DIFF WBC: CPT | Performed by: EMERGENCY MEDICINE

## 2024-10-13 PROCEDURE — 96374 THER/PROPH/DIAG INJ IV PUSH: CPT

## 2024-10-13 PROCEDURE — 87811 SARS-COV-2 COVID19 W/OPTIC: CPT | Performed by: EMERGENCY MEDICINE

## 2024-10-13 PROCEDURE — 99284 EMERGENCY DEPT VISIT MOD MDM: CPT

## 2024-10-13 PROCEDURE — 96361 HYDRATE IV INFUSION ADD-ON: CPT

## 2024-10-13 PROCEDURE — 87804 INFLUENZA ASSAY W/OPTIC: CPT | Performed by: EMERGENCY MEDICINE

## 2024-10-13 PROCEDURE — 87086 URINE CULTURE/COLONY COUNT: CPT | Performed by: EMERGENCY MEDICINE

## 2024-10-13 PROCEDURE — 99284 EMERGENCY DEPT VISIT MOD MDM: CPT | Performed by: EMERGENCY MEDICINE

## 2024-10-13 PROCEDURE — 80053 COMPREHEN METABOLIC PANEL: CPT | Performed by: EMERGENCY MEDICINE

## 2024-10-13 PROCEDURE — 83690 ASSAY OF LIPASE: CPT | Performed by: EMERGENCY MEDICINE

## 2024-10-13 PROCEDURE — 81003 URINALYSIS AUTO W/O SCOPE: CPT | Performed by: EMERGENCY MEDICINE

## 2024-10-13 PROCEDURE — 36415 COLL VENOUS BLD VENIPUNCTURE: CPT | Performed by: EMERGENCY MEDICINE

## 2024-10-13 RX ORDER — ONDANSETRON 2 MG/ML
4 INJECTION INTRAMUSCULAR; INTRAVENOUS ONCE
Status: COMPLETED | OUTPATIENT
Start: 2024-10-13 | End: 2024-10-13

## 2024-10-13 RX ORDER — SUCRALFATE 1 G/1
1 TABLET ORAL ONCE
Status: COMPLETED | OUTPATIENT
Start: 2024-10-13 | End: 2024-10-13

## 2024-10-13 RX ADMIN — SODIUM CHLORIDE 1000 ML: 0.9 INJECTION, SOLUTION INTRAVENOUS at 20:56

## 2024-10-13 RX ADMIN — SUCRALFATE 1 G: 1 TABLET ORAL at 20:57

## 2024-10-13 RX ADMIN — ONDANSETRON 4 MG: 2 INJECTION INTRAMUSCULAR; INTRAVENOUS at 20:56

## 2024-10-14 ENCOUNTER — TELEPHONE (OUTPATIENT)
Dept: RHEUMATOLOGY | Facility: CLINIC | Age: 34
End: 2024-10-14

## 2024-10-14 ENCOUNTER — NURSE TRIAGE (OUTPATIENT)
Dept: OTHER | Facility: OTHER | Age: 34
End: 2024-10-14

## 2024-10-14 DIAGNOSIS — M45.0 ANKYLOSING SPONDYLITIS OF MULTIPLE SITES IN SPINE (HCC): Primary | ICD-10-CM

## 2024-10-14 NOTE — TELEPHONE ENCOUNTER
Please let patient know that MRI was denied by insurance. I have ordered Xrays of the SI joints to start. Based on the results, we will determine if MRI is needed. Thank you!

## 2024-10-14 NOTE — TELEPHONE ENCOUNTER
"Regarding: endoscopy/ colonoscopy prep concerns  ----- Message from Tigist PORRAS sent at 10/14/2024  6:39 AM EDT -----  \" I have a colonoscopy and endoscopy today, but last night I got a low grade fever and I dont feel well. I couldn't finish the colonoscopy prep. Could I still get them both done ?\"    "

## 2024-10-14 NOTE — TELEPHONE ENCOUNTER
"Reason for Disposition  • [1] Caller has URGENT question or concern AND [2] triager unable to answer question    Answer Assessment - Initial Assessment Questions  1. DATE/TIME: \"When did you have your colonoscopy?\"       10/14/2024 2:30 pm    2. MAIN CONCERN: \"What is your main concern right now?\" \"What questions do you have?\"      Did not finish prep and not feeling well.    3. ABDOMEN PAIN: \"Are you having any abdomen (belly or stomach) pain?\" If Yes, ask: \"How bad is it?\" (e.g., Scale 0-10; mild, moderate, severe).      Mild pain but that is ongoing    4. OTHER SYMPTOMS: \"What other symptoms are you having?\" (e.g., rectal bleeding, bloating or feeling gassy, passing gas, vomiting, dizziness, fever).      Temp of 100 before going to the ER.    5. ONSET: \"When did your symptoms start?\"      yesterday    6. PATTERN: \"Is the symptom(s) constant or does it come and go?\" \"Is your symptom(s) getting worse, better, or staying the same?\"      Getting better.    Patient is calling because she went to the ER last night and did not finish her prep. She was in the ER due to body aches and felt like she had the flu but she is feeling better now. She finished about 25% of her Miralax and only took 2 dulcolax. She was calling to see if she could still come in for her endoscopy but not the colonoscopy.     Please call patient to follow up.    Protocols used: Colonoscopy Symptoms and Questions-Adult-    "

## 2024-10-14 NOTE — TELEPHONE ENCOUNTER
Spoke to Stephanie and explained best to reschedule colonoscopy and EGD to when she is feeling better. She understood. She wants to call us when she is feeling better to reschedule, did not want to do it now.

## 2024-10-14 NOTE — ED PROVIDER NOTES
Time reflects when diagnosis was documented in both MDM as applicable and the Disposition within this note       Time User Action Codes Description Comment    10/13/2024 10:46 PM Eddie hCung Add [R19.7] Diarrhea     10/13/2024 10:46 PM Eddie Chung [R52] Body aches     10/13/2024 10:46 PM Eddie Chung [B34.9] Viral syndrome           ED Disposition       ED Disposition   Discharge    Condition   Stable    Date/Time   Sun Oct 13, 2024 10:46 PM    Comment   Cruz Schneider discharge to home/self care.                   Assessment & Plan       Medical Decision Making  34-year-old female presenting for generalized upper abdominal pain, diarrhea.  She is prepping for colonoscopy tomorrow.  Was seen here 3 days ago for upper abdominal pain.  Negative CT at that time.  Abdominal pain is not worsened.  She is here for the diarrhea.  Was started on amoxicillin by family doctor after a positive urine culture.  No urinary symptoms  Will obtain viral panel.  Will obtain CBC, CMP, lipase given her abdominal pain.  Will check electrolytes.  Will hydrate with fluids  Labs within normal limits.  UA does not show any signs of infection.  I told her she is going to continue the antibiotics that she should take a probiotic with it.  Told her to continue her prep for colonoscopy tomorrow.    Problems Addressed:  Body aches: acute illness or injury  Diarrhea: acute illness or injury  Viral syndrome: acute illness or injury    Amount and/or Complexity of Data Reviewed  Labs: ordered.    Risk  Prescription drug management.             Medications   sodium chloride 0.9 % bolus 1,000 mL (0 mL Intravenous Stopped 10/13/24 2156)   ondansetron (ZOFRAN) injection 4 mg (4 mg Intravenous Given 10/13/24 2056)   sucralfate (CARAFATE) tablet 1 g (1 g Oral Given 10/13/24 2057)       ED Risk Strat Scores                                               History of Present Illness       Chief Complaint   Patient presents with    Cold Like  Symptoms     States body aches, nausea, and diarrhea starting today; was started on antibiotics for a UTI; also started taking prep for a colonscopy set for tomorrow       Past Medical History:   Diagnosis Date    Abnormal Pap smear of cervix     Allergic     Anxiety     Arthritis     Dental caries     Depression     Fibromyalgia, primary     GERD (gastroesophageal reflux disease)     Hypertension     IBS (irritable bowel syndrome)     Migraine     Obesity     Vitamin B12 deficiency       Past Surgical History:   Procedure Laterality Date    COLONOSCOPY N/A 5/8/2018    Procedure: COLONOSCOPY;  Surgeon: Stephanie Alston MD;  Location: [facility];  Service: Gastroenterology    DE EXC B9 LESION MRGN XCP SK TG S/N/H/F/G > 4.0CM N/A 7/1/2021    Procedure: EXCISION OF PILAR SCALP CYST X 2 AND RIGHT INNER GROIN NEVVUS;  Surgeon: Denis Barron MD;  Location:  MAIN OR;  Service: Plastics    DE REPAIR COMPLEX SCALP/ARM/LEG 2.6-7.5 CM N/A 7/1/2021    Procedure: CLOSURE WOUND SCALP X 2 AND RIGHT INNER GROIN;  Surgeon: Denis Barron MD;  Location:  MAIN OR;  Service: Plastics    TONSILLECTOMY      WISDOM TOOTH EXTRACTION        Family History   Problem Relation Age of Onset    Rheum arthritis Mother     Psoriasis Mother     Other Mother     Hypertension Mother     Diabetes unspecified Mother     Sjogren's syndrome Mother     Alcohol abuse Mother     Drug abuse Mother     Anxiety disorder Father     Alcohol abuse Father     Lung cancer Maternal Grandfather     Cancer Other         bone    Diabetes Other     Other Other         High blood pressure    Depression Maternal Grandmother       Social History     Tobacco Use    Smoking status: Never     Passive exposure: Past    Smokeless tobacco: Never   Vaping Use    Vaping status: Never Used   Substance Use Topics    Alcohol use: Not Currently     Comment: rarely    Drug use: Yes     Frequency: 2.0 times per week     Types: Marijuana     Comment: medical marijuana (vape)       E-Cigarette/Vaping    E-Cigarette Use Never User     Start Date 7/1/19     Comments medical marijuana       E-Cigarette/Vaping Substances    Nicotine No     THC No     CBD No     Flavoring No     Other No     Unknown No       I have reviewed and agree with the history as documented.     34-year-old female presenting for evaluation of diarrhea, upper abdominal pain.  Symptoms have been ongoing for the last few days.  Was seen here 3 days ago.  Had negative CT imaging at that time.  Abdominal pain has not worsened.  She was started on antibiotic for UTI by her family doctor.  She says that she has been having diarrhea.  She is also having diarrhea as she started a prep for her colonoscopy and endoscopy tomorrow.  Denies any vomiting, blood in her stools.  Any fevers or chills.  She does admit to some body aches.        Review of Systems   Constitutional:  Negative for fever and unexpected weight change.   HENT:  Negative for congestion, ear pain, sore throat and trouble swallowing.    Eyes:  Negative for pain and redness.   Respiratory:  Negative for cough, chest tightness and shortness of breath.    Cardiovascular:  Negative for chest pain and leg swelling.   Gastrointestinal:  Positive for abdominal pain and nausea. Negative for abdominal distention, diarrhea and vomiting.   Endocrine: Negative for polyuria.   Genitourinary:  Negative for dysuria, hematuria, pelvic pain and vaginal bleeding.   Musculoskeletal:  Negative for back pain and myalgias.   Skin:  Negative for rash.   Neurological:  Negative for dizziness, syncope, weakness, light-headedness and headaches.           Objective       ED Triage Vitals [10/13/24 2020]   Temperature Pulse Blood Pressure Respirations SpO2 Patient Position - Orthostatic VS   99 °F (37.2 °C) 102 135/73 18 95 % --      Temp Source Heart Rate Source BP Location FiO2 (%) Pain Score    Oral -- -- -- 3      Vitals      Date and Time Temp Pulse SpO2 Resp BP Pain Score FACES Pain Rating  User   10/13/24 2020 99 °F (37.2 °C) 102 95 % 18 135/73 3 -- AF            Physical Exam  Vitals and nursing note reviewed.   Constitutional:       General: She is not in acute distress.     Appearance: She is well-developed.   HENT:      Head: Normocephalic and atraumatic.      Right Ear: External ear normal.      Left Ear: External ear normal.      Nose: Nose normal.      Mouth/Throat:      Mouth: Mucous membranes are moist.      Pharynx: No oropharyngeal exudate.   Eyes:      Conjunctiva/sclera: Conjunctivae normal.      Pupils: Pupils are equal, round, and reactive to light.   Cardiovascular:      Rate and Rhythm: Normal rate and regular rhythm.      Heart sounds: Normal heart sounds. No murmur heard.     No friction rub. No gallop.   Pulmonary:      Effort: Pulmonary effort is normal. No respiratory distress.      Breath sounds: Normal breath sounds. No wheezing or rales.   Abdominal:      General: There is no distension.      Palpations: Abdomen is soft.      Tenderness: There is abdominal tenderness (mild, upper). There is no guarding.   Musculoskeletal:         General: No swelling, tenderness or deformity. Normal range of motion.      Cervical back: Normal range of motion and neck supple.   Lymphadenopathy:      Cervical: No cervical adenopathy.   Skin:     General: Skin is warm and dry.   Neurological:      General: No focal deficit present.      Mental Status: She is alert and oriented to person, place, and time. Mental status is at baseline.      Cranial Nerves: No cranial nerve deficit.      Sensory: No sensory deficit.      Motor: No weakness or abnormal muscle tone.      Coordination: Coordination normal.         Results Reviewed       Procedure Component Value Units Date/Time    Urine culture [[id number]] Collected: 10/13/24 2245    Lab Status: In process Specimen: Urine, Clean Catch Updated: 10/13/24 2248    UA (URINE) with reflex to Scope [[id number]]  (Abnormal) Collected: 10/13/24 2233    Lab  Status: Final result Specimen: Urine, Clean Catch Updated: 10/13/24 2240     Color, UA Yellow     Clarity, UA Clear     Specific Gravity, UA 1.025     pH, UA 6.0     Leukocytes, UA Negative     Nitrite, UA Negative     Protein, UA Negative mg/dl      Glucose, UA Negative mg/dl      Ketones, UA 15 (1+) mg/dl      Urobilinogen, UA 0.2 E.U./dl      Bilirubin, UA Negative     Occult Blood, UA Negative    Comprehensive metabolic panel [[id number]]  (Abnormal) Collected: 10/13/24 2059    Lab Status: Final result Specimen: Blood from Arm, Left Updated: 10/13/24 2128     Sodium 134 mmol/L      Potassium 3.7 mmol/L      Chloride 103 mmol/L      CO2 22 mmol/L      ANION GAP 9 mmol/L      BUN 7 mg/dL      Creatinine 0.73 mg/dL      Glucose 98 mg/dL      Calcium 9.4 mg/dL      AST 15 U/L      ALT 15 U/L      Alkaline Phosphatase 66 U/L      Total Protein 7.7 g/dL      Albumin 4.0 g/dL      Total Bilirubin 0.60 mg/dL      eGFR 108 ml/min/1.73sq m     Narrative:      National Kidney Disease Foundation guidelines for Chronic Kidney Disease (CKD):     Stage 1 with normal or high GFR (GFR > 90 mL/min/1.73 square meters)    Stage 2 Mild CKD (GFR = 60-89 mL/min/1.73 square meters)    Stage 3A Moderate CKD (GFR = 45-59 mL/min/1.73 square meters)    Stage 3B Moderate CKD (GFR = 30-44 mL/min/1.73 square meters)    Stage 4 Severe CKD (GFR = 15-29 mL/min/1.73 square meters)    Stage 5 End Stage CKD (GFR <15 mL/min/1.73 square meters)  Note: GFR calculation is accurate only with a steady state creatinine    Lipase [[id number]]  (Normal) Collected: 10/13/24 2059    Lab Status: Final result Specimen: Blood from Arm, Left Updated: 10/13/24 2128     Lipase 27 u/L     FLU/COVID Rapid Antigen (30 min. TAT) - Preferred screening test in ED [[id number]]  (Normal) Collected: 10/13/24 2059    Lab Status: Final result Specimen: Nares from Nose Updated: 10/13/24 2122     SARS COV Rapid Antigen Negative     Influenza A Rapid Antigen Negative     Influenza  B Rapid Antigen Negative    Narrative:      This test has been performed using the Quidel Deepa 2 FLU+SARS Antigen test under the Emergency Use Authorization (EUA). This test has been validated by the  and verified by the performing laboratory. The Deepa uses lateral flow immunofluorescent sandwich assay to detect SARS-COV, Influenza A and Influenza B Antigen.     The Quidel Deepa 2 SARS Antigen test does not differentiate between SARS-CoV and SARS-CoV-2.     Negative results are presumptive and may be confirmed with a molecular assay, if necessary, for patient management. Negative results do not rule out SARS-CoV-2 or influenza infection and should not be used as the sole basis for treatment or patient management decisions. A negative test result may occur if the level of antigen in a sample is below the limit of detection of this test.     Positive results are indicative of the presence of viral antigens, but do not rule out bacterial infection or co-infection with other viruses.     All test results should be used as an adjunct to clinical observations and other information available to the provider.    FOR PEDIATRIC PATIENTS - copy/paste COVID Guidelines URL to browser: [email or web address]    CBC and differential [[id number]]  (Abnormal) Collected: 10/13/24 2059    Lab Status: Final result Specimen: Blood from Arm, Left Updated: 10/13/24 2105     WBC 10.75 Thousand/uL      RBC 4.88 Million/uL      Hemoglobin 13.7 g/dL      Hematocrit 41.8 %      MCV 86 fL      MCH 28.1 pg      MCHC 32.8 g/dL      RDW 13.5 %      MPV 8.7 fL      Platelets 494 Thousands/uL      nRBC 0 /100 WBCs      Segmented % 73 %      Immature Grans % 0 %      Lymphocytes % 18 %      Monocytes % 7 %      Eosinophils Relative 1 %      Basophils Relative 1 %      Absolute Neutrophils 7.90 Thousands/µL      Absolute Immature Grans 0.02 Thousand/uL      Absolute Lymphocytes 1.95  Thousands/µL      Absolute Monocytes 0.72 Thousand/µL      Eosinophils Absolute 0.10 Thousand/µL      Basophils Absolute 0.06 Thousands/µL             No orders to display       Procedures    ED Medication and Procedure Management   Prior to Admission Medications   Prescriptions Last Dose Informant Patient Reported? Taking?   ALPRAZolam (XANAX) 0.5 mg tablet   No No   Sig: Take 1 tablet (0.5 mg total) by mouth 3 (three) times a day as needed for anxiety   Botox 200 units SOLR   Yes No   Cholecalciferol (Vitamin D3) 50 MCG (2000 UT) TABS   Yes No   Patient not taking: Reported on 10/7/2024   Cholecalciferol (Vitamin D3) 50 MCG (2000 UT) capsule   No No   Sig: Take 1 capsule (2,000 Units total) by mouth daily   Cyanocobalamin (Vitamin B-12) 500 MCG SUBL   No No   Sig: Place 1 tablet (500 mcg total) under the tongue in the morning   FeroSul 325 (65 Fe) MG tablet   No No   Sig: Take 1 tablet (325 mg total) by mouth in the morning   Galcanezumab-gnlm (Emgality) 120 MG/ML SOAJ   No No   Sig: One ml subcutaneous on the right thigh and 1 ml subcutaneous on the left thigh at the same time for 1 dose   Galcanezumab-gnlm 120 MG/ML SOAJ   No No   Sig: Inject 120 mg under the skin every 30 (thirty) days   Patient not taking: Reported on 10/11/2024   OLANZapine (ZyPREXA) 7.5 mg tablet   No No   Sig: Take 1 tablet (7.5 mg total) by mouth daily at bedtime Do not take with reglan.   RABEprazole (ACIPHEX) 20 MG tablet   No No   Sig: Take 1 tablet (20 mg total) by mouth 2 (two) times a day   SODIUM FLUORIDE, DENTAL GEL, 1.1 % GEL   No No   Sig: After brushing thoroughly with toothpaste, rinse as usual. Apply a thin ribbon of gel to the teeth with a toothbrush  once daily for at least one minute, preferably at bedtime.After use, expectorate gel. For best results, do not eat, drink or rinse for 30 minutes.   Semaglutide-Weight Management (Wegovy) 0.5 MG/0.5ML   No No   Sig: Inject 0.5 mg under the skin weekly   Trudhesa 0.725 MG/ACT  AERS   No No   Si spray in each nostril for total dose of 1.45mg, may repeat 1 dose after 1 hour. Max 2 doses per 24 hours and 3 doses per week.   amoxicillin (AMOXIL) 500 MG tablet   No No   Sig: Take 1 tablet (500 mg total) by mouth 2 (two) times a day for 7 days   ascorbic acid (VITAMIN C) 500 MG tablet   Yes No   Sig: Take 500 mg by mouth daily   buPROPion (WELLBUTRIN XL) 300 mg 24 hr tablet   No No   Sig: Take 1 tablet (300 mg total) by mouth daily   cyanocobalamin 1,000 mcg/mL   No No   Si IM injection every month   dicyclomine (BENTYL) 10 mg capsule   No No   Sig: Take 2 capsules (20 mg total) by mouth 3 (three) times a day before meals   diphenoxylate-atropine (LOMOTIL) 2.5-0.025 mg per tablet   No No   Sig: Take 1 tablet by mouth 4 (four) times a day as needed for diarrhea   escitalopram (LEXAPRO) 20 mg tablet   No No   Sig: Take 1 tablet (20 mg total) by mouth daily   esomeprazole (NexIUM) 40 MG capsule   No No   Sig: Take 1 capsule (40 mg total) by mouth daily   Patient not taking: Reported on 10/11/2024   etonogestrel-ethinyl estradiol (NuvaRing) 0.12-0.015 MG/24HR vaginal ring   No No   Sig: Insert ring for 21 days then remove for one week.   gabapentin (NEURONTIN) 300 mg capsule   No No   Sig: Take 1 capsule (300 mg total) by mouth 3 (three) times a day   indomethacin (INDOCIN) 25 mg capsule   No No   Si-2 tabs BID prn severe headache with food. Hold toradol.   loperamide (IMODIUM) 2 mg capsule   No No   Sig: Take 1 capsule (2 mg total) by mouth 4 (four) times a day as needed for diarrhea   magnesium Oxide (MAG-OX) 400 mg TABS   No No   Sig: Take 1 tablet (400 mg total) by mouth daily   methocarbamol (ROBAXIN) 500 mg tablet   No No   Sig: Take 1 tablet (500 mg total) by mouth 3 (three) times a day   metoprolol succinate (TOPROL-XL) 25 mg 24 hr tablet   Yes No   Patient not taking: Reported on 10/11/2024   nystatin (MYCOSTATIN) cream   No No   Sig: Apply topically 2 (two) times a day    onabotulinumtoxin A (BOTOX) 100 units  Self Yes No   Sig: Inject as directed   Patient not taking: Reported on 10/11/2024   ondansetron (ZOFRAN) 4 mg tablet   No No   Sig: Take 1 tablet (4 mg total) by mouth every 6 (six) hours   phentermine (ADIPEX-P) 37.5 MG tablet   No No   Sig: Take 1 tablet (37.5 mg total) by mouth in the morning   Patient not taking: Reported on 2024   polyethylene glycol (Golytely) 4000 mL solution   No No   Sig: Take 4,000 mL by mouth once for 1 dose Take 4000 mL by mouth once for 1 dose. Use as directed   pramipexole (MIRAPEX) 0.25 mg tablet   Yes No   prazosin (MINIPRESS) 2 mg capsule   No No   Sig: Take 1 capsule (2 mg total) by mouth daily at bedtime   predniSONE 10 mg tablet   No No   Si pills daily for 3 days, 3 for 3 days, 2 for 3 days, 1 for 3 days.   Patient not taking: Reported on 9/10/2024      Facility-Administered Medications Last Administration Doses Remaining   cyanocobalamin injection 1,000 mcg 3/14/2024  2:50 PM         Discharge Medication List as of 10/13/2024 10:46 PM        CONTINUE these medications which have NOT CHANGED    Details   ALPRAZolam (XANAX) 0.5 mg tablet Take 1 tablet (0.5 mg total) by mouth 3 (three) times a day as needed for anxiety, Starting 2024, Normal      amoxicillin (AMOXIL) 500 MG tablet Take 1 tablet (500 mg total) by mouth 2 (two) times a day for 7 days, Starting Fri 10/11/2024, Until Fri 10/18/2024, Normal      ascorbic acid (VITAMIN C) 500 MG tablet Take 500 mg by mouth daily, Historical Med      !! Botox 200 units SOLR Historical Med      buPROPion (WELLBUTRIN XL) 300 mg 24 hr tablet Take 1 tablet (300 mg total) by mouth daily, Starting 2024, Normal      Cholecalciferol (Vitamin D3) 50 MCG (2000) capsule Take 1 capsule (2,000 Units total) by mouth daily, Starting 2023, Normal      Cholecalciferol (Vitamin D3) 50 MCG ( UT) TABS Starting 2023, Historical Med      Cyanocobalamin (Vitamin B-12)  500 MCG SUBL Place 1 tablet (500 mcg total) under the tongue in the morning, Starting Wed 1/17/2024, Until Sun 7/7/2024, Normal      cyanocobalamin 1,000 mcg/mL 1 IM injection every month, Normal      dicyclomine (BENTYL) 10 mg capsule Take 2 capsules (20 mg total) by mouth 3 (three) times a day before meals, Starting Thu 8/8/2024, Normal      diphenoxylate-atropine (LOMOTIL) 2.5-0.025 mg per tablet Take 1 tablet by mouth 4 (four) times a day as needed for diarrhea, Starting Mon 7/15/2024, Normal      escitalopram (LEXAPRO) 20 mg tablet Take 1 tablet (20 mg total) by mouth daily, Starting Wed 8/14/2024, Normal      esomeprazole (NexIUM) 40 MG capsule Take 1 capsule (40 mg total) by mouth daily, Starting Thu 9/5/2024, Normal      etonogestrel-ethinyl estradiol (NuvaRing) 0.12-0.015 MG/24HR vaginal ring Insert ring for 21 days then remove for one week., Normal      FeroSul 325 (65 Fe) MG tablet Take 1 tablet (325 mg total) by mouth in the morning, Starting Fri 8/11/2023, Normal      gabapentin (NEURONTIN) 300 mg capsule Take 1 capsule (300 mg total) by mouth 3 (three) times a day, Starting Wed 9/25/2024, Normal      !! Galcanezumab-gnlm (Emgality) 120 MG/ML SOAJ One ml subcutaneous on the right thigh and 1 ml subcutaneous on the left thigh at the same time for 1 dose, Normal      !! Galcanezumab-gnlm 120 MG/ML SOAJ Inject 120 mg under the skin every 30 (thirty) days, Starting Mon 7/15/2024, Normal      indomethacin (INDOCIN) 25 mg capsule 1-2 tabs BID prn severe headache with food. Hold toradol., Normal      loperamide (IMODIUM) 2 mg capsule Take 1 capsule (2 mg total) by mouth 4 (four) times a day as needed for diarrhea, Starting Sun 12/24/2023, Normal      magnesium Oxide (MAG-OX) 400 mg TABS Take 1 tablet (400 mg total) by mouth daily, Starting Tue 10/31/2023, Until Sun 7/7/2024, Normal      methocarbamol (ROBAXIN) 500 mg tablet Take 1 tablet (500 mg total) by mouth 3 (three) times a day, Starting Mon 7/1/2024,  Until Sat 12/28/2024, Normal      metoprolol succinate (TOPROL-XL) 25 mg 24 hr tablet Historical Med      nystatin (MYCOSTATIN) cream Apply topically 2 (two) times a day, Starting Tue 9/10/2024, Normal      OLANZapine (ZyPREXA) 7.5 mg tablet Take 1 tablet (7.5 mg total) by mouth daily at bedtime Do not take with reglan., Starting Wed 8/14/2024, Normal      !! onabotulinumtoxin A (BOTOX) 100 units Inject as directed, Starting Tue 8/29/2017, Historical Med      ondansetron (ZOFRAN) 4 mg tablet Take 1 tablet (4 mg total) by mouth every 6 (six) hours, Starting Fri 1/5/2024, Normal      phentermine (ADIPEX-P) 37.5 MG tablet Take 1 tablet (37.5 mg total) by mouth in the morning, Starting Mon 6/17/2024, Normal      polyethylene glycol (Golytely) 4000 mL solution Take 4,000 mL by mouth once for 1 dose Take 4000 mL by mouth once for 1 dose. Use as directed, Starting Mon 10/7/2024, Normal      pramipexole (MIRAPEX) 0.25 mg tablet Historical Med      prazosin (MINIPRESS) 2 mg capsule Take 1 capsule (2 mg total) by mouth daily at bedtime, Starting Fri 7/12/2024, Normal      predniSONE 10 mg tablet 4 pills daily for 3 days, 3 for 3 days, 2 for 3 days, 1 for 3 days., Normal      RABEprazole (ACIPHEX) 20 MG tablet Take 1 tablet (20 mg total) by mouth 2 (two) times a day, Starting Mon 10/7/2024, Normal      Semaglutide-Weight Management (Wegovy) 0.5 MG/0.5ML Inject 0.5 mg under the skin weekly, Normal      SODIUM FLUORIDE, DENTAL GEL, 1.1 % GEL After brushing thoroughly with toothpaste, rinse as usual. Apply a thin ribbon of gel to the teeth with a toothbrush  once daily for at least one minute, preferably at bedtime.After use, expectorate gel. For best results, do not eat, drink or rinse for 3 0 minutes., Normal      Trudhesa 0.725 MG/ACT AERS 1 spray in each nostril for total dose of 1.45mg, may repeat 1 dose after 1 hour. Max 2 doses per 24 hours and 3 doses per week., Normal       !! - Potential duplicate medications found.  Please discuss with provider.        No discharge procedures on file.  ED SEPSIS DOCUMENTATION   Time reflects when diagnosis was documented in both MDM as applicable and the Disposition within this note       Time User Action Codes Description Comment    10/13/2024 10:46 PM Eddie Chung [R19.7] Diarrhea     10/13/2024 10:46 PM Eddie Chung [R52] Body aches     10/13/2024 10:46 PM Eddie Chung [B34.9] Viral syndrome                  Eddie Chung DO  10/13/24 8922

## 2024-10-15 DIAGNOSIS — R19.7 DIARRHEA, UNSPECIFIED TYPE: Primary | ICD-10-CM

## 2024-10-15 LAB — BACTERIA UR CULT: NORMAL

## 2024-10-15 RX ORDER — BISACODYL 5 MG/1
TABLET, DELAYED RELEASE ORAL
Qty: 4 TABLET | Refills: 0 | Status: SHIPPED | OUTPATIENT
Start: 2024-10-15

## 2024-10-15 RX ORDER — POLYETHYLENE GLYCOL 3350 17 G/17G
POWDER, FOR SOLUTION ORAL
Qty: 238 G | Refills: 0 | Status: SHIPPED | OUTPATIENT
Start: 2024-10-15

## 2024-10-16 ENCOUNTER — TELEPHONE (OUTPATIENT)
Age: 34
End: 2024-10-16

## 2024-10-16 ENCOUNTER — APPOINTMENT (OUTPATIENT)
Dept: RADIOLOGY | Facility: CLINIC | Age: 34
End: 2024-10-16
Payer: COMMERCIAL

## 2024-10-16 DIAGNOSIS — M45.0 ANKYLOSING SPONDYLITIS OF MULTIPLE SITES IN SPINE (HCC): ICD-10-CM

## 2024-10-16 DIAGNOSIS — F43.12 CHRONIC POST-TRAUMATIC STRESS DISORDER (PTSD): ICD-10-CM

## 2024-10-16 DIAGNOSIS — G43.709 CHRONIC MIGRAINE WITHOUT AURA WITHOUT STATUS MIGRAINOSUS, NOT INTRACTABLE: ICD-10-CM

## 2024-10-16 PROCEDURE — 72202 X-RAY EXAM SI JOINTS 3/> VWS: CPT

## 2024-10-16 RX ORDER — DIHYDROERGOTAMINE MESYLATE 4 MG/ML
SPRAY, METERED NASAL
Qty: 12 ML | Refills: 2 | Status: SHIPPED | OUTPATIENT
Start: 2024-10-16

## 2024-10-16 RX ORDER — PRAZOSIN HYDROCHLORIDE 2 MG/1
2 CAPSULE ORAL
Qty: 90 CAPSULE | Refills: 0 | Status: SHIPPED | OUTPATIENT
Start: 2024-10-16

## 2024-10-16 NOTE — TELEPHONE ENCOUNTER
Oni Beckham, DO to [facility]  31 minutes ago  Call patient.  She can get the COVID or flu shot prior to colonoscopy.  Will hold off with Wegovy until after scopes completed     Spoke with patient and she is aware she is just wondering if she starts on the .5 mg wegovy or start from the beginning dose ?

## 2024-10-16 NOTE — TELEPHONE ENCOUNTER
Contacted patient and she is not taking any dose of wegovy for two weeks and now she would like to start again but she is also scheduled for colonoscopy and endoscopy on 11/1, she is wondering if she can start again or wait until after that, also if she dose start can she start at the 0.5 mg or start over?   Also should she wait to get covid and flu before procedure on 11/1/24 or wait until after?

## 2024-10-16 NOTE — TELEPHONE ENCOUNTER
Patient had SI joint xrays completed which are unremarkable. Please resubmit prior auth for MRI. Thank you

## 2024-10-16 NOTE — TELEPHONE ENCOUNTER
Patient called in and stated that she is having problems with   Wegovy. She stated that she was given the wrong dose and it is effecting her.    Patient is requesting a call back at . She also wants to know if she should wait to get her covid/ flu shot till after her procedure.    Thank you

## 2024-10-16 NOTE — TELEPHONE ENCOUNTER
Reason for call:   [x] Refill   [] Prior Auth  [] Other:     Office:   [] PCP/Provider -   [x] Specialty/Provider - Psych    Medication: Prazosin 2 mg, take 1 capsule by mouth daily at bedtime       Pharmacy: [facility]     Does the patient have enough for 3 days?   [x] Yes   [] No - Send as HP to POD

## 2024-10-16 NOTE — TELEPHONE ENCOUNTER
Scheduled date of EGD/colonoscopy (as of today):11/1/24  Physician performing EGD/colonoscopy:DR METCALF  Location of EGD/colonoscopy:[facility]  Desired bowel prep reviewed with patient:PT HAS INSTRUCTIONS FROM EARLIER SCHEDULED  DATE  Instructions reviewed with patient by:JAY  Clearances: NA

## 2024-10-16 NOTE — TELEPHONE ENCOUNTER
----- Message from Gwendolyn KWONG sent at 10/8/2024  7:19 AM EDT -----  [facility] refill request

## 2024-10-18 NOTE — PROGRESS NOTES
Ambulatory Visit  Name: Cruz Schenider      : 1990      MRN: [id number]  Encounter Provider: Kulwinder Nunn MD  Encounter Date: 10/21/2024   Encounter department: [facility] [place]    Assessment & Plan  Biliary dyskinesia  The patient is a pleasant 34-year-old female returning to the office following an ER evaluation for her complaints of upper abdominal pain.    Today the patient reports feeling generally the same with epigastric and bilateral upper abdominal pain as well as intermittent generalized abdominal pain with diarrhea and constipation.    On physical examination she is well-appearing.  Carolyn competent reliable as a historian.  She has benign abdominal examination without notable tenderness to percussion and palpation.  She does localize her discomfort principally to the epigastrium during the physical examination.    Patient is a pleasant 34-year-old female with multi factorial abdominal pain, diarrhea and constipation.    It is quite likely that the patient's biliary dyskinesia is playing a role in her symptom complex although the degree to which laparoscopic cholecystectomy will relieve her symptoms is difficult to predict.    Patient is scheduled to undergo EGD and colonoscopy on  with gastroenterology.    I look forward to seeing her back after the studies after which we will entertain the indications for surgery, reviewed the options benefits risks and alternatives in detail with the patient and her mother and make an informed decision.           History of Present Illness     Cruz Schneider is a 34 y.o. female with a med h/o of IBS who presents for a  ER follow up on 10/13 - abd pain located across her upper mid abd that has worsen in the past two months but overall has been dealing with for a about a year. Pt suffers from nausea but no vomiting. She treats her nausea with Zofran and it mildly helps. Pt suffers from constipation that she treats with  dulcolax and it helps a lot, she also suffers from diarrhea that she treats with imodium and lomotil; she states it helps a little. Pt suffers from heart burn and belching with bad taste that she treats with Rabeprazole and tums and she states it helps a lot. Pt also suffers from fevers/chills that she treats with tylenol and it helps. Pt states the abd pain happens at random but its mostly after eating. Pt has a c-scope schedule for 11/01/24.     History obtained from : patient  Review of Systems   Constitutional:  Negative for chills and fever.   HENT:  Negative for ear pain and sore throat.    Eyes:  Negative for pain and visual disturbance.   Respiratory:  Negative for cough and shortness of breath.    Cardiovascular:  Negative for chest pain and palpitations.   Gastrointestinal:  Positive for abdominal pain, constipation and diarrhea. Negative for vomiting.   Genitourinary:  Negative for dysuria and hematuria.   Musculoskeletal:  Negative for arthralgias and back pain.   Skin:  Negative for color change and rash.   Neurological:  Negative for seizures and syncope.   All other systems reviewed and are negative.    Pertinent Medical History         Medical History Reviewed by provider this encounter:       Past Medical History   Past Medical History:   Diagnosis Date    Abnormal Pap smear of cervix     Allergic     Anxiety     Arthritis     Dental caries     Depression     Fibromyalgia, primary     GERD (gastroesophageal reflux disease)     Hypertension     IBS (irritable bowel syndrome)     Migraine     Obesity     Vitamin B12 deficiency      Past Surgical History:   Procedure Laterality Date    COLONOSCOPY N/A 5/8/2018    Procedure: COLONOSCOPY;  Surgeon: Stephanie Alston MD;  Location: [facility] GI LAB;  Service: Gastroenterology    MT EXC B9 LESION MRGN XCP SK TG S/N/H/F/G > 4.0CM N/A 7/1/2021    Procedure: EXCISION OF PILAR SCALP CYST X 2 AND RIGHT INNER GROIN NEVVUS;  Surgeon: Denis Barron MD;  Location:   MAIN OR;  Service: Plastics    SC REPAIR COMPLEX SCALP/ARM/LEG 2.6-7.5 CM N/A 7/1/2021    Procedure: CLOSURE WOUND SCALP X 2 AND RIGHT INNER GROIN;  Surgeon: Denis Barron MD;  Location:  MAIN OR;  Service: Plastics    TONSILLECTOMY      WISDOM TOOTH EXTRACTION       Family History   Problem Relation Age of Onset    Rheum arthritis Mother     Psoriasis Mother     Other Mother     Hypertension Mother     Diabetes unspecified Mother     Sjogren's syndrome Mother     Alcohol abuse Mother     Drug abuse Mother     Anxiety disorder Father     Alcohol abuse Father     Lung cancer Maternal Grandfather     Cancer Other         bone    Diabetes Other     Other Other         High blood pressure    Depression Maternal Grandmother      Current Outpatient Medications on File Prior to Visit   Medication Sig Dispense Refill    ALPRAZolam (XANAX) 0.5 mg tablet Take 1 tablet (0.5 mg total) by mouth 3 (three) times a day as needed for anxiety 90 tablet 2    ascorbic acid (VITAMIN C) 500 MG tablet Take 500 mg by mouth daily      bisacodyl (DULCOLAX) 5 mg EC tablet Take as directed by GI office 4 tablet 0    Botox 200 units SOLR       buPROPion (WELLBUTRIN XL) 300 mg 24 hr tablet Take 1 tablet (300 mg total) by mouth daily 90 tablet 0    Cholecalciferol (Vitamin D3) 50 MCG (2000 UT) capsule Take 1 capsule (2,000 Units total) by mouth daily 90 capsule 1    cyanocobalamin 1,000 mcg/mL 1 IM injection every month 3 mL 3    dicyclomine (BENTYL) 10 mg capsule Take 2 capsules (20 mg total) by mouth 3 (three) times a day before meals 120 capsule 1    diphenoxylate-atropine (LOMOTIL) 2.5-0.025 mg per tablet Take 1 tablet by mouth 4 (four) times a day as needed for diarrhea 30 tablet 0    escitalopram (LEXAPRO) 20 mg tablet Take 1 tablet (20 mg total) by mouth daily 90 tablet 0    etonogestrel-ethinyl estradiol (NuvaRing) 0.12-0.015 MG/24HR vaginal ring Insert ring for 21 days then remove for one week. 3 each 4    FeroSul 325 (65 Fe) MG  tablet Take 1 tablet (325 mg total) by mouth in the morning 90 tablet 1    gabapentin (NEURONTIN) 300 mg capsule Take 1 capsule (300 mg total) by mouth 3 (three) times a day 90 capsule 5    Galcanezumab-gnlm (Emgality) 120 MG/ML SOAJ One ml subcutaneous on the right thigh and 1 ml subcutaneous on the left thigh at the same time for 1 dose 2 mL 0    indomethacin (INDOCIN) 25 mg capsule 1-2 tabs BID prn severe headache with food. Hold toradol. 30 capsule 6    loperamide (IMODIUM) 2 mg capsule Take 1 capsule (2 mg total) by mouth 4 (four) times a day as needed for diarrhea 12 capsule 0    methocarbamol (ROBAXIN) 500 mg tablet Take 1 tablet (500 mg total) by mouth 3 (three) times a day 270 tablet 1    nystatin (MYCOSTATIN) cream Apply topically 2 (two) times a day 15 g 0    OLANZapine (ZyPREXA) 7.5 mg tablet Take 1 tablet (7.5 mg total) by mouth daily at bedtime Do not take with reglan. 90 tablet 0    ondansetron (ZOFRAN) 4 mg tablet Take 1 tablet (4 mg total) by mouth every 6 (six) hours 30 tablet 2    pramipexole (MIRAPEX) 0.25 mg tablet       prazosin (MINIPRESS) 2 mg capsule Take 1 capsule (2 mg total) by mouth daily at bedtime 90 capsule 0    RABEprazole (ACIPHEX) 20 MG tablet Take 1 tablet (20 mg total) by mouth 2 (two) times a day 60 tablet 3    Trudhesa 0.725 MG/ACT AERS 1 spray in each nostril for total dose of 1.45mg, may repeat 1 dose after 1 hour. Max 2 doses per 24 hours and 3 doses per week. 12 mL 2    Cholecalciferol (Vitamin D3) 50 MCG (2000 UT) TABS  (Patient not taking: Reported on 10/7/2024)      Cyanocobalamin (Vitamin B-12) 500 MCG SUBL Place 1 tablet (500 mcg total) under the tongue in the morning 30 tablet 2    esomeprazole (NexIUM) 40 MG capsule Take 1 capsule (40 mg total) by mouth daily (Patient not taking: Reported on 10/11/2024) 90 capsule 3    Galcanezumab-gnlm 120 MG/ML SOAJ Inject 120 mg under the skin every 30 (thirty) days (Patient not taking: Reported on 10/11/2024) 1 mL 11    magnesium  Oxide (MAG-OX) 400 mg TABS Take 1 tablet (400 mg total) by mouth daily 30 tablet 5    onabotulinumtoxin A (BOTOX) 100 units Inject as directed (Patient not taking: Reported on 10/11/2024)      polyethylene glycol (GLYCOLAX) 17 GM/SCOOP powder Take as directed by GI office (Patient not taking: Reported on 10/21/2024) 238 g 0    polyethylene glycol (Golytely) 4000 mL solution Take 4,000 mL by mouth once for 1 dose Take 4000 mL by mouth once for 1 dose. Use as directed 4000 mL 0    predniSONE 10 mg tablet 4 pills daily for 3 days, 3 for 3 days, 2 for 3 days, 1 for 3 days. (Patient not taking: Reported on 9/10/2024) 30 tablet 0    Semaglutide-Weight Management (Wegovy) 0.5 MG/0.5ML Inject 0.5 mg under the skin weekly (Patient not taking: Reported on 10/21/2024) 2 mL 0    SODIUM FLUORIDE, DENTAL GEL, 1.1 % GEL After brushing thoroughly with toothpaste, rinse as usual. Apply a thin ribbon of gel to the teeth with a toothbrush  once daily for at least one minute, preferably at bedtime.After use, expectorate gel. For best results, do not eat, drink or rinse for 30 minutes. (Patient not taking: Reported on 10/21/2024) 100 mL 0     Current Facility-Administered Medications on File Prior to Visit   Medication Dose Route Frequency Provider Last Rate Last Admin    cyanocobalamin injection 1,000 mcg  1,000 mcg Intramuscular Q30 Days Oni Beckham, DO   1,000 mcg at 03/14/24 1450     Allergies   Allergen Reactions    Cimzia [Certolizumab Pegol] Swelling    Desvenlafaxine      Other reaction(s): state of confusion    Ixekizumab Vomiting    Seasonal Ic [Octacosanol] Nasal Congestion    Tizanidine Anxiety      Current Outpatient Medications on File Prior to Visit   Medication Sig Dispense Refill    ALPRAZolam (XANAX) 0.5 mg tablet Take 1 tablet (0.5 mg total) by mouth 3 (three) times a day as needed for anxiety 90 tablet 2    ascorbic acid (VITAMIN C) 500 MG tablet Take 500 mg by mouth daily      bisacodyl (DULCOLAX) 5 mg EC tablet  Take as directed by GI office 4 tablet 0    Botox 200 units SOLR       buPROPion (WELLBUTRIN XL) 300 mg 24 hr tablet Take 1 tablet (300 mg total) by mouth daily 90 tablet 0    Cholecalciferol (Vitamin D3) 50 MCG (2000 UT) capsule Take 1 capsule (2,000 Units total) by mouth daily 90 capsule 1    cyanocobalamin 1,000 mcg/mL 1 IM injection every month 3 mL 3    dicyclomine (BENTYL) 10 mg capsule Take 2 capsules (20 mg total) by mouth 3 (three) times a day before meals 120 capsule 1    diphenoxylate-atropine (LOMOTIL) 2.5-0.025 mg per tablet Take 1 tablet by mouth 4 (four) times a day as needed for diarrhea 30 tablet 0    escitalopram (LEXAPRO) 20 mg tablet Take 1 tablet (20 mg total) by mouth daily 90 tablet 0    etonogestrel-ethinyl estradiol (NuvaRing) 0.12-0.015 MG/24HR vaginal ring Insert ring for 21 days then remove for one week. 3 each 4    FeroSul 325 (65 Fe) MG tablet Take 1 tablet (325 mg total) by mouth in the morning 90 tablet 1    gabapentin (NEURONTIN) 300 mg capsule Take 1 capsule (300 mg total) by mouth 3 (three) times a day 90 capsule 5    Galcanezumab-gnlm (Emgality) 120 MG/ML SOAJ One ml subcutaneous on the right thigh and 1 ml subcutaneous on the left thigh at the same time for 1 dose 2 mL 0    indomethacin (INDOCIN) 25 mg capsule 1-2 tabs BID prn severe headache with food. Hold toradol. 30 capsule 6    loperamide (IMODIUM) 2 mg capsule Take 1 capsule (2 mg total) by mouth 4 (four) times a day as needed for diarrhea 12 capsule 0    methocarbamol (ROBAXIN) 500 mg tablet Take 1 tablet (500 mg total) by mouth 3 (three) times a day 270 tablet 1    nystatin (MYCOSTATIN) cream Apply topically 2 (two) times a day 15 g 0    OLANZapine (ZyPREXA) 7.5 mg tablet Take 1 tablet (7.5 mg total) by mouth daily at bedtime Do not take with reglan. 90 tablet 0    ondansetron (ZOFRAN) 4 mg tablet Take 1 tablet (4 mg total) by mouth every 6 (six) hours 30 tablet 2    pramipexole (MIRAPEX) 0.25 mg tablet       prazosin  (MINIPRESS) 2 mg capsule Take 1 capsule (2 mg total) by mouth daily at bedtime 90 capsule 0    RABEprazole (ACIPHEX) 20 MG tablet Take 1 tablet (20 mg total) by mouth 2 (two) times a day 60 tablet 3    Trudhesa 0.725 MG/ACT AERS 1 spray in each nostril for total dose of 1.45mg, may repeat 1 dose after 1 hour. Max 2 doses per 24 hours and 3 doses per week. 12 mL 2    Cholecalciferol (Vitamin D3) 50 MCG (2000 UT) TABS  (Patient not taking: Reported on 10/7/2024)      Cyanocobalamin (Vitamin B-12) 500 MCG SUBL Place 1 tablet (500 mcg total) under the tongue in the morning 30 tablet 2    esomeprazole (NexIUM) 40 MG capsule Take 1 capsule (40 mg total) by mouth daily (Patient not taking: Reported on 10/11/2024) 90 capsule 3    Galcanezumab-gnlm 120 MG/ML SOAJ Inject 120 mg under the skin every 30 (thirty) days (Patient not taking: Reported on 10/11/2024) 1 mL 11    magnesium Oxide (MAG-OX) 400 mg TABS Take 1 tablet (400 mg total) by mouth daily 30 tablet 5    onabotulinumtoxin A (BOTOX) 100 units Inject as directed (Patient not taking: Reported on 10/11/2024)      polyethylene glycol (GLYCOLAX) 17 GM/SCOOP powder Take as directed by GI office (Patient not taking: Reported on 10/21/2024) 238 g 0    polyethylene glycol (Golytely) 4000 mL solution Take 4,000 mL by mouth once for 1 dose Take 4000 mL by mouth once for 1 dose. Use as directed 4000 mL 0    predniSONE 10 mg tablet 4 pills daily for 3 days, 3 for 3 days, 2 for 3 days, 1 for 3 days. (Patient not taking: Reported on 9/10/2024) 30 tablet 0    Semaglutide-Weight Management (Wegovy) 0.5 MG/0.5ML Inject 0.5 mg under the skin weekly (Patient not taking: Reported on 10/21/2024) 2 mL 0    SODIUM FLUORIDE, DENTAL GEL, 1.1 % GEL After brushing thoroughly with toothpaste, rinse as usual. Apply a thin ribbon of gel to the teeth with a toothbrush  once daily for at least one minute, preferably at bedtime.After use, expectorate gel. For best results, do not eat, drink or rinse  "for 30 minutes. (Patient not taking: Reported on 10/21/2024) 100 mL 0     Current Facility-Administered Medications on File Prior to Visit   Medication Dose Route Frequency Provider Last Rate Last Admin    cyanocobalamin injection 1,000 mcg  1,000 mcg Intramuscular Q30 Days Oni Beckham DO   1,000 mcg at 03/14/24 1450      Social History     Tobacco Use    Smoking status: Never     Passive exposure: Past    Smokeless tobacco: Never   Vaping Use    Vaping status: Never Used   Substance and Sexual Activity    Alcohol use: Not Currently     Comment: rarely    Drug use: Yes     Frequency: 2.0 times per week     Types: Marijuana     Comment: medical marijuana (vape)    Sexual activity: Not Currently     Partners: Male     Comment: Nuva ring         Objective     /80 (BP Location: Left arm, Patient Position: Sitting, Cuff Size: Standard)   Pulse 84   Temp (!) 97.1 °F (36.2 °C) (Temporal)   Resp 18   Ht 5' 3\" (1.6 m)   Wt 111 kg (245 lb)   SpO2 97%   BMI 43.40 kg/m²     Physical Exam  Vitals and nursing note reviewed.   Constitutional:       General: She is not in acute distress.     Appearance: She is well-developed.   HENT:      Head: Normocephalic and atraumatic.   Eyes:      Conjunctiva/sclera: Conjunctivae normal.   Cardiovascular:      Rate and Rhythm: Normal rate and regular rhythm.      Heart sounds: No murmur heard.  Pulmonary:      Effort: Pulmonary effort is normal. No respiratory distress.      Breath sounds: Normal breath sounds.   Abdominal:      Palpations: Abdomen is soft.      Tenderness: There is no abdominal tenderness.      Comments: Mild epigastric tenderness to palpation   Musculoskeletal:         General: No swelling.      Cervical back: Neck supple.   Skin:     General: Skin is warm and dry.      Capillary Refill: Capillary refill takes less than 2 seconds.   Neurological:      Mental Status: She is alert.   Psychiatric:         Mood and Affect: Mood normal.       Administrative " Statements   I have spent a total time of 20 minutes in caring for this patient on the day of the visit/encounter including Impressions.

## 2024-10-21 ENCOUNTER — OFFICE VISIT (OUTPATIENT)
Dept: SURGERY | Facility: CLINIC | Age: 34
End: 2024-10-21
Payer: COMMERCIAL

## 2024-10-21 VITALS
HEIGHT: 63 IN | HEART RATE: 84 BPM | OXYGEN SATURATION: 97 % | SYSTOLIC BLOOD PRESSURE: 112 MMHG | BODY MASS INDEX: 43.41 KG/M2 | TEMPERATURE: 97.1 F | RESPIRATION RATE: 18 BRPM | DIASTOLIC BLOOD PRESSURE: 80 MMHG | WEIGHT: 245 LBS

## 2024-10-21 DIAGNOSIS — K82.8 BILIARY DYSKINESIA: Primary | ICD-10-CM

## 2024-10-21 PROCEDURE — 99213 OFFICE O/P EST LOW 20 MIN: CPT | Performed by: SURGERY

## 2024-10-21 NOTE — ASSESSMENT & PLAN NOTE
The patient is a pleasant 34-year-old female returning to the office following an ER evaluation for her complaints of upper abdominal pain.    Today the patient reports feeling generally the same with epigastric and bilateral upper abdominal pain as well as intermittent generalized abdominal pain with diarrhea and constipation.    On physical examination she is well-appearing.  Carolyn competent reliable as a historian.  She has benign abdominal examination without notable tenderness to percussion and palpation.  She does localize her discomfort principally to the epigastrium during the physical examination.    Patient is a pleasant 34-year-old female with multi factorial abdominal pain, diarrhea and constipation.    It is quite likely that the patient's biliary dyskinesia is playing a role in her symptom complex although the degree to which laparoscopic cholecystectomy will relieve her symptoms is difficult to predict.    Patient is scheduled to undergo EGD and colonoscopy on November 1 with gastroenterology.    I look forward to seeing her back after the studies after which we will entertain the indications for surgery, reviewed the options benefits risks and alternatives in detail with the patient and her mother and make an informed decision.

## 2024-10-25 ENCOUNTER — TELEMEDICINE (OUTPATIENT)
Dept: BEHAVIORAL/MENTAL HEALTH CLINIC | Facility: CLINIC | Age: 34
End: 2024-10-25
Payer: COMMERCIAL

## 2024-10-25 ENCOUNTER — HOSPITAL ENCOUNTER (OUTPATIENT)
Dept: MRI IMAGING | Facility: HOSPITAL | Age: 34
End: 2024-10-25
Attending: STUDENT IN AN ORGANIZED HEALTH CARE EDUCATION/TRAINING PROGRAM
Payer: COMMERCIAL

## 2024-10-25 DIAGNOSIS — M45.0 ANKYLOSING SPONDYLITIS OF MULTIPLE SITES IN SPINE (HCC): ICD-10-CM

## 2024-10-25 DIAGNOSIS — F33.1 DEPRESSION, MAJOR, RECURRENT, MODERATE (HCC): Primary | Chronic | ICD-10-CM

## 2024-10-25 DIAGNOSIS — F41.1 GENERALIZED ANXIETY DISORDER: Chronic | ICD-10-CM

## 2024-10-25 PROCEDURE — A9585 GADOBUTROL INJECTION: HCPCS | Performed by: STUDENT IN AN ORGANIZED HEALTH CARE EDUCATION/TRAINING PROGRAM

## 2024-10-25 PROCEDURE — 90837 PSYTX W PT 60 MINUTES: CPT | Performed by: SOCIAL WORKER

## 2024-10-25 PROCEDURE — 72197 MRI PELVIS W/O & W/DYE: CPT

## 2024-10-25 RX ORDER — GADOBUTROL 604.72 MG/ML
10 INJECTION INTRAVENOUS
Status: COMPLETED | OUTPATIENT
Start: 2024-10-25 | End: 2024-10-25

## 2024-10-25 RX ADMIN — GADOBUTROL 10 ML: 604.72 INJECTION INTRAVENOUS at 20:48

## 2024-10-25 NOTE — BH TREATMENT PLAN
Outpatient Behavioral Health Psychotherapy Treatment Plan    Stephanie Schneider  1990     Date of Initial Psychotherapy Assessment: June 26, 2019     Date of Current Treatment Plan: 05/03/2024  Treatment Plan Target Date: 02/22/2025  Treatment Plan Expiration Date: 04/23/2025    Diagnosis:   1. Depression, major, recurrent, moderate (HCC)        2. Generalized anxiety disorder          Area(s) of Need: Mood regulation; relationships; physical health issues    Long Term Goal 1 (in the client's own words): I want to improve my physical health and continue to work on managing my anxiety and depression.     Stage of Change: Action    Target Date for completion: October 25, 2025     Anticipated therapeutic modalities: client-centered therapy; dialectical behavior therapy; motivational interviewing     People identified to complete this goal: Stephanie (client); Shanna Moreno (psychotherapist); Dr. Nunn (psychiatrist)      Objective 1: (identify the means of measuring success in meeting the objective): Stephanie will meet with Dr. Nunn for medication management sessions. She will maintain adherence with her medication regimen. She reports no significant side effects at this time. She currently reports missing 1-2 doses of medications occasionally--primarily due to her medical issues and levels of fatigue.    Update 10/25/2024: Stephanie continues to meet with Dr. Nunn for medication management. Stephanie identifies that she has recently struggled to take her medications regularly. She states that she is feeling overwhelmed by the grief from her grandmother's death and feels that there is a lot to do in her day. She states that she is having difficulty motivating herself to take her medications. We will discuss techniques to help her remember and be more motivated to take her medications. We will examine the barriers that are impacting her motivation to maintain adherence with her medications and work to overcome the  barriers that are currently impacting her.       Objective 2: (identify the means of measuring success in meeting the objective): Stephanie will participate in DBT-informed therapy-- to learn and practice a minimum of three distress-tolerance skills. She will discuss successes and barriers in her therapy sessions.    Update 10/25/2024 Stephanie states that she wants to increase her use of mindfulness-based strategies. She states that she finds mindfulness to be positive for her overall wellbeing. We will spend time, during sessions, working on identifying various mindfulness techniques to increase her ability to practice these skills. She identifies that she is struggling to practice these skills since the death of her grandmother. She is able to identify the benefits to mindfulness. Her goal is to practice at least once per day.       Objective 3: (identify the means of measuring success in meeting the objective): Stephanie will practice DBT-informed effective communication skills. She will continue to set limits/boundaries with friends/family and will discuss successes and barriers in her therapy sessions.    Update 10/25/2024: Stephanie continues to struggle with emotional boundaries with her best friend. We will continue to work on developing Stephanie's sense of limits and boundaries-- especially with this friend. Stephanie will work on using GRACIELA skills to address her friends/family regarding difficult issues. Stephanie will continue to work on increasing her contacts with additional social supports.      Objective 4: (identify the means of measuring success in meeting the objective): Stephanie will discuss medical issues and impact on MH symptoms. She will continue to find a balance between pushing her physical activity level and managing overall emotional and physical fatigue. We will discuss successes and barriers in her therapy sessions.    Update 10/25/2024: Stephanie continues to focus on her medical health issues. Stephanie has  just been awarded social security benefits. This will assist her with maintaining increased financial independence. She is meeting with a new rheumatologist who is re-evaluating her past diagnoses, to ensure that they are correct. She will be developing a treatment plan with her medical providers to address her symptoms. We will continue to process the connections between her emotional health and her mental health.       Objective 5: (identify the means of measuring success in meeting the objective): Stephanie will process the death of her grandmother and will work through this process. We will discuss her progress in her therapy sessions.     Update 10/25/2024:  Stephanie appears to be moving through her grief process in an appropriate manner. She would like to keep this objective on the treatment plan, as she continues to work through this recent loss.      Objective 6: (identify the means of measuring success in meeting the objective): Stephanie will begin to focus on understanding her emotions and developing skills to manage and maintain her emotion regulation. The therapist will provide Stephanie with education and practice exercises (using DBT-informed skills) to increase her ability to identify and manage her moods.    NEW OBJECTIVE as of 10/25/2024.    I am currently under the care of a [facility] psychiatric provider: yes    My [facility] psychiatric provider is: Dr. Aliyah Ghosh    I am currently taking psychiatric medications: Yes, as prescribed    I feel that I will be ready for discharge from mental health care when I reach the following (measurable goal/objective): I will not have as much anxiety as I approach an event or an activity. I will just be able to do it, without having to over plan.                      UPDATE 10/25/2024: No changes    For children and adults who have a legal guardian:   Has there been any change to custody orders and/or guardianship status? NA. If yes, attach updated  documentation.    Crisis plan was updated during this session.     Behavioral Health Treatment Plan [facility]: Diagnosis and Treatment Plan explained to Stephanie Schneider acknowledges an understanding of their diagnosis. Stephanie Schneider agrees to this treatment plan. The treatment plan is being sent to the client, via the [employer] platform. This clinician will check for the signature at the next individual session.     I have been offered a copy of this Treatment Plan. Yes (via [employer])

## 2024-10-25 NOTE — PSYCH
Virtual Regular Visit    Verification of patient location:    Patient is located at Home in the following state in which I hold an active license [place]    Assessment/Plan:    Problem List Items Addressed This Visit          Behavioral Health    Generalized anxiety disorder (Chronic)    Depression, major, recurrent, moderate (HCC) - Primary (Chronic)     Goals addressed in session: Goal 1      Reason for visit is   Chief Complaint   Patient presents with    Virtual Regular Visit     Encounter provider APARNA Fox    Recent Visits  No visits were found meeting these conditions.  Showing recent visits within past 7 days and meeting all other requirements  Today's Visits  Date Type Provider Dept   10/25/24 Telemedicine APARNA Fox [facility] Therapist [place]   Showing today's visits and meeting all other requirements  Future Appointments  No visits were found meeting these conditions.  Showing future appointments within next 150 days and meeting all other requirements     The patient was identified by name and date of birth. Cruz Schneider was informed that this is a telemedicine visit and that the visit is being conducted throughthe Epic Embedded platform. She agrees to proceed..  My office door was closed. No one else was in the room.  She acknowledged consent and understanding of privacy and security of the video platform. The patient has agreed to participate and understands they can discontinue the visit at any time.    Patient is aware this is a billable service.     Subjective  Cruz Schneider is a 34 y.o. female.    HPI     Past Medical History:   Diagnosis Date    Abnormal Pap smear of cervix     Allergic     Anxiety     Arthritis     Dental caries     Depression     Fibromyalgia, primary     GERD (gastroesophageal reflux disease)     Hypertension     IBS (irritable bowel syndrome)     Migraine     Obesity     Vitamin B12 deficiency        Past Surgical History:   Procedure  Laterality Date    COLONOSCOPY N/A 5/8/2018    Procedure: COLONOSCOPY;  Surgeon: Stephanie Alston MD;  Location: [facility];  Service: Gastroenterology    NV EXC B9 LESION MRGN XCP SK TG S/N/H/F/G > 4.0CM N/A 7/1/2021    Procedure: EXCISION OF PILAR SCALP CYST X 2 AND RIGHT INNER GROIN NEVVUS;  Surgeon: Denis Barron MD;  Location:  MAIN OR;  Service: Plastics    NV REPAIR COMPLEX SCALP/ARM/LEG 2.6-7.5 CM N/A 7/1/2021    Procedure: CLOSURE WOUND SCALP X 2 AND RIGHT INNER GROIN;  Surgeon: Denis Barron MD;  Location:  MAIN OR;  Service: Plastics    TONSILLECTOMY      WISDOM TOOTH EXTRACTION         Current Outpatient Medications   Medication Sig Dispense Refill    ALPRAZolam (XANAX) 0.5 mg tablet Take 1 tablet (0.5 mg total) by mouth 3 (three) times a day as needed for anxiety 90 tablet 2    ascorbic acid (VITAMIN C) 500 MG tablet Take 500 mg by mouth daily      bisacodyl (DULCOLAX) 5 mg EC tablet Take as directed by GI office 4 tablet 0    Botox 200 units SOLR       buPROPion (WELLBUTRIN XL) 300 mg 24 hr tablet Take 1 tablet (300 mg total) by mouth daily 90 tablet 0    Cholecalciferol (Vitamin D3) 50 MCG (2000 UT) capsule Take 1 capsule (2,000 Units total) by mouth daily 90 capsule 1    Cholecalciferol (Vitamin D3) 50 MCG (2000 UT) TABS  (Patient not taking: Reported on 10/7/2024)      Cyanocobalamin (Vitamin B-12) 500 MCG SUBL Place 1 tablet (500 mcg total) under the tongue in the morning 30 tablet 2    cyanocobalamin 1,000 mcg/mL 1 IM injection every month 3 mL 3    dicyclomine (BENTYL) 10 mg capsule Take 2 capsules (20 mg total) by mouth 3 (three) times a day before meals 120 capsule 1    diphenoxylate-atropine (LOMOTIL) 2.5-0.025 mg per tablet Take 1 tablet by mouth 4 (four) times a day as needed for diarrhea 30 tablet 0    escitalopram (LEXAPRO) 20 mg tablet Take 1 tablet (20 mg total) by mouth daily 90 tablet 0    esomeprazole (NexIUM) 40 MG capsule Take 1 capsule (40 mg total) by mouth daily  (Patient not taking: Reported on 10/11/2024) 90 capsule 3    etonogestrel-ethinyl estradiol (NuvaRing) 0.12-0.015 MG/24HR vaginal ring Insert ring for 21 days then remove for one week. 3 each 4    FeroSul 325 (65 Fe) MG tablet Take 1 tablet (325 mg total) by mouth in the morning 90 tablet 1    gabapentin (NEURONTIN) 300 mg capsule Take 1 capsule (300 mg total) by mouth 3 (three) times a day 90 capsule 5    Galcanezumab-gnlm (Emgality) 120 MG/ML SOAJ One ml subcutaneous on the right thigh and 1 ml subcutaneous on the left thigh at the same time for 1 dose 2 mL 0    Galcanezumab-gnlm 120 MG/ML SOAJ Inject 120 mg under the skin every 30 (thirty) days (Patient not taking: Reported on 10/11/2024) 1 mL 11    indomethacin (INDOCIN) 25 mg capsule 1-2 tabs BID prn severe headache with food. Hold toradol. 30 capsule 6    loperamide (IMODIUM) 2 mg capsule Take 1 capsule (2 mg total) by mouth 4 (four) times a day as needed for diarrhea 12 capsule 0    magnesium Oxide (MAG-OX) 400 mg TABS Take 1 tablet (400 mg total) by mouth daily 30 tablet 5    methocarbamol (ROBAXIN) 500 mg tablet Take 1 tablet (500 mg total) by mouth 3 (three) times a day 270 tablet 1    nystatin (MYCOSTATIN) cream Apply topically 2 (two) times a day 15 g 0    OLANZapine (ZyPREXA) 7.5 mg tablet Take 1 tablet (7.5 mg total) by mouth daily at bedtime Do not take with reglan. 90 tablet 0    onabotulinumtoxin A (BOTOX) 100 units Inject as directed (Patient not taking: Reported on 10/11/2024)      ondansetron (ZOFRAN) 4 mg tablet Take 1 tablet (4 mg total) by mouth every 6 (six) hours 30 tablet 2    polyethylene glycol (GLYCOLAX) 17 GM/SCOOP powder Take as directed by GI office (Patient not taking: Reported on 10/21/2024) 238 g 0    polyethylene glycol (Golytely) 4000 mL solution Take 4,000 mL by mouth once for 1 dose Take 4000 mL by mouth once for 1 dose. Use as directed 4000 mL 0    pramipexole (MIRAPEX) 0.25 mg tablet       prazosin (MINIPRESS) 2 mg capsule  Take 1 capsule (2 mg total) by mouth daily at bedtime 90 capsule 0    predniSONE 10 mg tablet 4 pills daily for 3 days, 3 for 3 days, 2 for 3 days, 1 for 3 days. (Patient not taking: Reported on 9/10/2024) 30 tablet 0    RABEprazole (ACIPHEX) 20 MG tablet Take 1 tablet (20 mg total) by mouth 2 (two) times a day 60 tablet 3    Semaglutide-Weight Management (Wegovy) 0.5 MG/0.5ML Inject 0.5 mg under the skin weekly (Patient not taking: Reported on 10/21/2024) 2 mL 0    SODIUM FLUORIDE, DENTAL GEL, 1.1 % GEL After brushing thoroughly with toothpaste, rinse as usual. Apply a thin ribbon of gel to the teeth with a toothbrush  once daily for at least one minute, preferably at bedtime.After use, expectorate gel. For best results, do not eat, drink or rinse for 30 minutes. (Patient not taking: Reported on 10/21/2024) 100 mL 0    Trudhesa 0.725 MG/ACT AERS 1 spray in each nostril for total dose of 1.45mg, may repeat 1 dose after 1 hour. Max 2 doses per 24 hours and 3 doses per week. 12 mL 2     Current Facility-Administered Medications   Medication Dose Route Frequency Provider Last Rate Last Admin    cyanocobalamin injection 1,000 mcg  1,000 mcg Intramuscular Q30 Days Oni Beckham DO   1,000 mcg at 03/14/24 1450        Allergies   Allergen Reactions    Cimzia [Certolizumab Pegol] Swelling    Desvenlafaxine      Other reaction(s): state of confusion    Ixekizumab Vomiting    Seasonal Ic [Octacosanol] Nasal Congestion    Tizanidine Anxiety       Review of Systems    Video Exam    There were no vitals filed for this visit.    Physical Exam     Behavioral Health Psychotherapy Progress Note    Psychotherapy Provided: Individual Psychotherapy     1. Depression, major, recurrent, moderate (HCC)        2. Generalized anxiety disorder            Goals addressed in session: Goal 1     DATA: Met with Stephanie for her scheduled individual session. She states that she was approved for Social Security! She talked about the relief this  brings to her, as she will be able to contribute to the household and also pay for things on her own. She states that she continues to have a goal of wanting to get back to work. We discussed the fact that she can use the Social Security as a stepping stone-- to give her enough independence now to work on her recovery while looking to the future and vocational opportunities. Stephanie discussed her relationship with her friend Jacqueline. She states that she is trying to maintain firm boundaries with Jacqueline. She discussed ways that she is agreeable to supporting her, while also maintaining a focus on her own self-care. Stephanie discussed a recent appointment with rheumatology. She states that they are trying to determine if the ankylosing spondylitis diagnosis is accurate. She expressed anger/frustration for the treatments that she has been subjected to if this diagnosis is not accurate. This clinician encouraged her to maintain a mindful stance, as she does not yet know the outcome of the medical decisions. We spent some time reviewing and updating Stephanie's treatment plan. She will review the treatment plan and sign it through the schoox application.     During this session, this clinician used the following therapeutic modalities: Client-centered Therapy, Dialectical Behavior Therapy, Mindfulness-based Strategies, Motivational Interviewing, Solution-Focused Therapy, and Supportive Psychotherapy    Substance Abuse was not addressed during this session. If the client is diagnosed with a co-occurring substance use disorder, please indicate any changes in the frequency or amount of use: n/a. Stage of change for addressing substance use diagnoses: No substance use/Not applicable    ASSESSMENT:  Stephanie Schneider presents with a Euthymic/ normal mood.     her affect is Normal range and intensity, which is congruent, with her mood and the content of the session. The client has made progress on their goals.    Stephanie Schneider  "presents with a minimal risk of suicide, minimal risk of self-harm, and minimal risk of harm to others.    For any risk assessment that surpasses a \"low\" rating, a safety plan must be developed.    A safety plan was indicated: no  If yes, describe in detail n/a    PLAN: Between sessions, Stephanie Schneider will continue to engage in increased self-care opportunities. She will . At the next session, the therapist will use Client-centered Therapy, Dialectical Behavior Therapy, Mindfulness-based Strategies, Motivational Interviewing, Solution-Focused Therapy, and Supportive Psychotherapy to address her mood regulation and relationship concerns. Stephanie will continue to work with her medical providers to develop a healthcare treatment plan. .    Behavioral Health Treatment Plan and Discharge Planning: Stephanie Schneider is aware of and agrees to continue to work on their treatment plan. They have identified and are working toward their discharge goals. yes    Visit start and stop times:    10/25/24  Start Time: 0819  Stop Time: 0913  Total Visit Time: 54 minutes        "

## 2024-10-29 ENCOUNTER — OFFICE VISIT (OUTPATIENT)
Dept: DENTISTRY | Facility: CLINIC | Age: 34
End: 2024-10-29

## 2024-10-29 VITALS — HEART RATE: 92 BPM | SYSTOLIC BLOOD PRESSURE: 124 MMHG | DIASTOLIC BLOOD PRESSURE: 80 MMHG | TEMPERATURE: 97.7 F

## 2024-10-29 DIAGNOSIS — K02.9 DENTAL CARIES: Primary | ICD-10-CM

## 2024-10-29 PROCEDURE — D2331 RESIN-BASED COMPOSITE - 2 SURFACES, ANTERIOR: HCPCS

## 2024-10-29 NOTE — PROGRESS NOTES
Composite Filling    Cruz Schneider presents for composite filling. PMH reviewed, no changes.    Applied topical benzocaine, administered 1 carp 4% articaine 1:100k epi via buccal infiltration     Prepped tooth #27 DF with 835 moshe on high speed. Caries removed with round carbide on slow speed. Placed Mylar matrix and wooden wedge. Isolation with cotton rolls     Etch with 37% H2PO4, rinse, dry. Applied Adhese with 20 second scrub once, gentle air dry and light cured for 10s. Restored with Tetric bulk haley shade A2 and light cured.    Refined with finishing burs, polished with enhance point. Verified occlusion and contacts. Pt left satisfied.    NV: #9 DL, 12 B

## 2024-10-30 ENCOUNTER — OFFICE VISIT (OUTPATIENT)
Dept: DENTISTRY | Facility: CLINIC | Age: 34
End: 2024-10-30

## 2024-10-30 VITALS — TEMPERATURE: 97.1 F | DIASTOLIC BLOOD PRESSURE: 87 MMHG | HEART RATE: 85 BPM | SYSTOLIC BLOOD PRESSURE: 129 MMHG

## 2024-10-30 DIAGNOSIS — K02.9 DENTAL CARIES: Primary | ICD-10-CM

## 2024-10-30 PROCEDURE — D2331 RESIN-BASED COMPOSITE - 2 SURFACES, ANTERIOR: HCPCS

## 2024-11-01 ENCOUNTER — ANESTHESIA (OUTPATIENT)
Dept: GASTROENTEROLOGY | Facility: HOSPITAL | Age: 34
End: 2024-11-01
Payer: COMMERCIAL

## 2024-11-01 ENCOUNTER — HOSPITAL ENCOUNTER (OUTPATIENT)
Dept: GASTROENTEROLOGY | Facility: HOSPITAL | Age: 34
Setting detail: OUTPATIENT SURGERY
End: 2024-11-01
Attending: STUDENT IN AN ORGANIZED HEALTH CARE EDUCATION/TRAINING PROGRAM
Payer: COMMERCIAL

## 2024-11-01 ENCOUNTER — ANESTHESIA EVENT (OUTPATIENT)
Dept: GASTROENTEROLOGY | Facility: HOSPITAL | Age: 34
End: 2024-11-01
Payer: COMMERCIAL

## 2024-11-01 VITALS
HEART RATE: 84 BPM | SYSTOLIC BLOOD PRESSURE: 129 MMHG | TEMPERATURE: 97.1 F | WEIGHT: 245 LBS | BODY MASS INDEX: 43.41 KG/M2 | OXYGEN SATURATION: 96 % | RESPIRATION RATE: 17 BRPM | HEIGHT: 63 IN | DIASTOLIC BLOOD PRESSURE: 84 MMHG

## 2024-11-01 DIAGNOSIS — R19.4 CHANGE IN BOWEL HABIT: ICD-10-CM

## 2024-11-01 DIAGNOSIS — R10.13 EPIGASTRIC PAIN: ICD-10-CM

## 2024-11-01 LAB
EXT PREGNANCY TEST URINE: NEGATIVE
EXT. CONTROL: NORMAL

## 2024-11-01 PROCEDURE — 81025 URINE PREGNANCY TEST: CPT | Performed by: STUDENT IN AN ORGANIZED HEALTH CARE EDUCATION/TRAINING PROGRAM

## 2024-11-01 PROCEDURE — 88305 TISSUE EXAM BY PATHOLOGIST: CPT | Performed by: PATHOLOGY

## 2024-11-01 PROCEDURE — 43235 EGD DIAGNOSTIC BRUSH WASH: CPT | Performed by: STUDENT IN AN ORGANIZED HEALTH CARE EDUCATION/TRAINING PROGRAM

## 2024-11-01 PROCEDURE — 45380 COLONOSCOPY AND BIOPSY: CPT | Performed by: STUDENT IN AN ORGANIZED HEALTH CARE EDUCATION/TRAINING PROGRAM

## 2024-11-01 RX ORDER — PROPOFOL 10 MG/ML
INJECTION, EMULSION INTRAVENOUS AS NEEDED
Status: DISCONTINUED | OUTPATIENT
Start: 2024-11-01 | End: 2024-11-01

## 2024-11-01 RX ORDER — LIDOCAINE HYDROCHLORIDE 20 MG/ML
INJECTION, SOLUTION EPIDURAL; INFILTRATION; INTRACAUDAL; PERINEURAL AS NEEDED
Status: DISCONTINUED | OUTPATIENT
Start: 2024-11-01 | End: 2024-11-01

## 2024-11-01 RX ORDER — SODIUM CHLORIDE, SODIUM LACTATE, POTASSIUM CHLORIDE, CALCIUM CHLORIDE 600; 310; 30; 20 MG/100ML; MG/100ML; MG/100ML; MG/100ML
125 INJECTION, SOLUTION INTRAVENOUS CONTINUOUS
Status: DISCONTINUED | OUTPATIENT
Start: 2024-11-01 | End: 2024-11-05 | Stop reason: HOSPADM

## 2024-11-01 RX ORDER — SODIUM CHLORIDE, SODIUM LACTATE, POTASSIUM CHLORIDE, CALCIUM CHLORIDE 600; 310; 30; 20 MG/100ML; MG/100ML; MG/100ML; MG/100ML
INJECTION, SOLUTION INTRAVENOUS CONTINUOUS PRN
Status: DISCONTINUED | OUTPATIENT
Start: 2024-11-01 | End: 2024-11-01

## 2024-11-01 RX ORDER — PROPOFOL 10 MG/ML
INJECTION, EMULSION INTRAVENOUS CONTINUOUS PRN
Status: DISCONTINUED | OUTPATIENT
Start: 2024-11-01 | End: 2024-11-01

## 2024-11-01 RX ADMIN — SODIUM CHLORIDE, SODIUM LACTATE, POTASSIUM CHLORIDE, AND CALCIUM CHLORIDE: .6; .31; .03; .02 INJECTION, SOLUTION INTRAVENOUS at 10:14

## 2024-11-01 RX ADMIN — PROPOFOL 150 MG: 10 INJECTION, EMULSION INTRAVENOUS at 10:31

## 2024-11-01 RX ADMIN — SODIUM CHLORIDE, SODIUM LACTATE, POTASSIUM CHLORIDE, AND CALCIUM CHLORIDE 125 ML/HR: .6; .31; .03; .02 INJECTION, SOLUTION INTRAVENOUS at 10:19

## 2024-11-01 RX ADMIN — PROPOFOL 100 MG: 10 INJECTION, EMULSION INTRAVENOUS at 10:36

## 2024-11-01 RX ADMIN — LIDOCAINE HYDROCHLORIDE 100 MG: 20 INJECTION, SOLUTION EPIDURAL; INFILTRATION; INTRACAUDAL; PERINEURAL at 10:31

## 2024-11-01 RX ADMIN — PROPOFOL 100 MCG/KG/MIN: 10 INJECTION, EMULSION INTRAVENOUS at 10:41

## 2024-11-01 NOTE — ANESTHESIA POSTPROCEDURE EVALUATION
Post-Op Assessment Note    CV Status:  Stable  Pain Score: 0    Pain management: adequate       Mental Status:  Sleepy   Hydration Status:  Euvolemic   PONV Controlled:  Controlled   Airway Patency:  Patent     Post Op Vitals Reviewed: Yes    No anethesia notable event occurred.    Staff: CRNA       Last Filed PACU Vitals:  Vitals Value Taken Time   Temp     Pulse     BP     Resp     SpO2         Modified Jasper:  Activity: 2 (11/1/2024 10:06 AM)  Respiration: 2 (11/1/2024 10:06 AM)  Circulation: 2 (11/1/2024 10:06 AM)  Consciousness: 2 (11/1/2024 10:06 AM)  Oxygen Saturation: 2 (11/1/2024 10:06 AM)  Modified Jasper Score: 10 (11/1/2024 10:06 AM)

## 2024-11-01 NOTE — ANESTHESIA PREPROCEDURE EVALUATION
Procedure:  EGD  COLONOSCOPY    Relevant Problems   CARDIO   (+) Anterior chest wall pain   (+) Basilar migraine   (+) Chronic migraine without aura without status migrainosus, not intractable   (+) Hyperlipidemia   (+) Hypertension   (+) Intractable chronic migraine without aura and with status migrainosus   (+) Intractable migraine with aura without status migrainosus   (+) Migraine headache   (+) Migraine with aura and with status migrainosus      GI/HEPATIC   (+) Biliary dyskinesia   (+) Gastroesophageal reflux disease without esophagitis   (+) Hyperinsulinemia      MUSCULOSKELETAL   (+) Acute bilateral thoracic back pain   (+) Ankylosing spondylitis (HCC)   (+) Biliary dyskinesia   (+) Chronic low back pain   (+) Fibromyalgia   (+) Fibromyalgia syndrome   (+) TMJ (dislocation of temporomandibular joint)      NEURO/PSYCH   (+) Basilar migraine   (+) Chronic low back pain   (+) Chronic migraine without aura without status migrainosus, not intractable   (+) Depression, major, recurrent, moderate (HCC)   (+) Fibromyalgia   (+) Fibromyalgia syndrome   (+) Generalized anxiety disorder   (+) Intractable chronic migraine without aura and with status migrainosus   (+) Intractable migraine with aura without status migrainosus   (+) Migraine headache   (+) Migraine with aura and with status migrainosus   (+) Other complicated headache syndrome   (+) Panic disorder without agoraphobia   (+) Paresthesias      PULMONARY   (+) Pharyngitis due to Streptococcus species   (+) URI (upper respiratory infection)   (+) Upper respiratory infection      Orthopedic/Musculoskeletal   (+) Cervical radiculitis      FEN/Gastrointestinal   (+) Irritable bowel syndrome with both constipation and diarrhea      Other   (+) Class 3 severe obesity due to excess calories with body mass index (BMI) of 45.0 to 49.9 in adult (HCC)        Physical Exam    Airway    Mallampati score: III  TM Distance: >3 FB  Neck ROM: full     Dental   No notable  "dental hx     Cardiovascular      Pulmonary   No wheezes    Other Findings  post-pubertal.      Anesthesia Plan  ASA Score- 3     Anesthesia Type- IV sedation with anesthesia with ASA Monitors.         Additional Monitors:     Airway Plan:            Plan Factors-    Chart reviewed. EKG reviewed.  Existing labs reviewed. Patient summary reviewed.    Patient is not a current smoker.  Patient did not smoke on day of surgery.            Induction- intravenous.    Postoperative Plan-     Perioperative Resuscitation Plan - Level 1 - Full Code.       Informed Consent- Anesthetic plan and risks discussed with patient.  I personally reviewed this patient with the CRNA. Discussed and agreed on the Anesthesia Plan with the CRNA..      VITALS  /75   Pulse 91   Temp (!) 97.1 °F (36.2 °C) (Temporal)   Resp 18   Ht 5' 3\" (1.6 m)   Wt 111 kg (245 lb)   LMP 11/01/2023 (Approximate)   SpO2 98%   BMI 43.40 kg/m²  BP Readings from Last 3 Encounters:   11/01/24 136/75   10/30/24 129/87   10/29/24 124/80        LABS  Results from Last 12 Months   Lab Units 10/13/24  2059 10/10/24  0641   WBC Thousand/uL 10.75* 8.30   HEMOGLOBIN g/dL 13.7 11.8   HEMATOCRIT % 41.8 36.4   PLATELETS Thousands/uL 494* 447*     Results from Last 12 Months   Lab Units 10/13/24  2059 10/10/24  0641   SODIUM mmol/L 134* 136   POTASSIUM mmol/L 3.7 3.6   CHLORIDE mmol/L 103 106   CO2 mmol/L 22 20*   ANION GAP mmol/L 9 10   BUN mg/dL 7 9   CREATININE mg/dL 0.73 0.53*   CALCIUM mg/dL 9.4 8.9   GLUCOSE RANDOM mg/dL 98 103   MAGNESIUM mg/dL  --  1.9     No results for input(s): \"APTT\", \"INR\", \"PTT\" in the last 8784 hours.    ECG      ECHOCARDIOGRAPHY OR OTHER TESTING/IMAGING  Normal sinus rhythm  Normal ECG  When compared with ECG of 25-NOV-2022 10:09,  No significant change was found  Confirmed by Eliot Carlos ([id number]) on 12/2/2022 8:54:43 PMNo results found. However, due to the size of the patient record, not all encounters were searched. Please " check Results Review for a complete set of results.  No results found for this or any previous visit. N/a     ------- ANESTHESIA RISK-BENEFIT DISCUSSION -------  BENEFITS OF A SPECIALIZED ANESTHESIA TEAM INCLUDE (NBK [id number], PMID 74677202):  (1) Reduce mortality and morbidity for major surgeries/procedures. (2) The team provides analgesia/sedation/amnesia/akinesia as safely as possible. (3) The team strives to reduce discomfort as safely as possible.    RISKS, AND PLANS TO MITIGATE RISKS, INCLUDE:    - Neurologic system: IntraOp awareness (Risk is ~1:1,000 - 1:14,000; PMID 54705699), Stroke (Risk ~<0.1-2% for most cases; PMID [id number]), nerve injury, vision loss, and POCD.     - Airway and Pulmonary system: Dental or mouth injury, throat pain, critical hypoxia, pneumothorax, prolonged intubation, post-op respiratory compromise.  Airway/Intubation risks and prior data:  Mask Ventilation: Ventilated by mask (1); Brand: LMA; Size: 4; Insertion Attempts: 1; Placement Verify: End tidal CO2; Removal --- BMI >40  Major ARISCAT risk factors for pulmonary complications include: Other factors/Clinical gestalt: 1 pt, yielding a score of 0-1= Low risk, 1.6%.  - Cardiovascular system: Hypotension, arrhythmias, cardiac injury or arrest, blood clots, bleeding, infection, vascular injuries.  Saman's RCRI score items: none, yielding an RCRI Score of 0= 0.4% risk of MACE  Are marylin-op or intra-op beta blockers indicated? (PMID [id number]): no  - FEN/GI system: Aspiration risk (~0.5% per PMC 0148155) and PONV (10-80% per Apfel score) especially if the patient has not fasted.  ASA NPO guideline compliance?: Yes  - Medication risk assessment: Allergic reactions, excessive bleeding with anticoagulant use, overdoses, drug-drug interactions, injury to a fetus or  in pregnant or breastfeeding patients, marylin-procedural sedation including while driving/operating machinery.  Recent relevant medications: See MAR or Med Review  Personal or  family history of anesthesia complications: no  Pregnancy Status: Negative  - Estimate mortality risks associated with anesthesia based on ASA-PS (PMID [id number]): ASA-PS III: 1:3,500

## 2024-11-01 NOTE — H&P
[facility]  History & Physical     PATIENT INFO     Name: Cruz Schneider  YOB: 1990   Age: 34 y.o.   Sex: female   MRN: [id number]     HISTORY OF PRESENT ILLNESS     Cruz Schneider is a 34 y.o. year old female who presents for EGD and colonoscopy for epigastric pain, GERD, abnormal bowel habits.  Last colonoscopy 5 to 6 years ago.  No antithrombotics or anticoagulants.     REVIEW OF SYSTEMS     Per the HPI, and otherwise unremarkable.    Historical Information   Past Medical History:   Diagnosis Date    Abnormal Pap smear of cervix     Allergic     Anxiety     Arthritis     Dental caries     Depression     Fibromyalgia, primary     GERD (gastroesophageal reflux disease)     Hypertension     IBS (irritable bowel syndrome)     Migraine     Obesity     Vitamin B12 deficiency      Past Surgical History:   Procedure Laterality Date    COLONOSCOPY N/A 5/8/2018    Procedure: COLONOSCOPY;  Surgeon: Stephanie Alston MD;  Location: [facility] GI LAB;  Service: Gastroenterology    GA EXC B9 LESION MRGN XCP SK TG S/N/H/F/G > 4.0CM N/A 7/1/2021    Procedure: EXCISION OF PILAR SCALP CYST X 2 AND RIGHT INNER GROIN NEVVUS;  Surgeon: Denis Barron MD;  Location:  MAIN OR;  Service: Plastics    GA REPAIR COMPLEX SCALP/ARM/LEG 2.6-7.5 CM N/A 7/1/2021    Procedure: CLOSURE WOUND SCALP X 2 AND RIGHT INNER GROIN;  Surgeon: Denis Barron MD;  Location:  MAIN OR;  Service: Plastics    TONSILLECTOMY      WISDOM TOOTH EXTRACTION       Social History   Social History     Substance and Sexual Activity   Alcohol Use Not Currently    Comment: rarely     Social History     Substance and Sexual Activity   Drug Use Yes    Frequency: 2.0 times per week    Types: Marijuana    Comment: medical marijuana (vape)     Social History     Tobacco Use   Smoking Status Never    Passive exposure: Past   Smokeless Tobacco Never     Family History   Problem Relation Age of Onset    Rheum arthritis Mother      Psoriasis Mother     Other Mother     Hypertension Mother     Diabetes unspecified Mother     Sjogren's syndrome Mother     Alcohol abuse Mother     Drug abuse Mother     Anxiety disorder Father     Alcohol abuse Father     Lung cancer Maternal Grandfather     Cancer Other         bone    Diabetes Other     Other Other         High blood pressure    Depression Maternal Grandmother         MEDICATIONS & ALLERGIES     Current Outpatient Medications   Medication Instructions    ALPRAZolam (XANAX) 0.5 mg, Oral, 3 times daily PRN    ascorbic acid (VITAMIN C) 500 mg, Oral, Daily    bisacodyl (DULCOLAX) 5 mg EC tablet Take as directed by GI office    Botox 200 units SOLR     buPROPion (WELLBUTRIN XL) 300 mg, Oral, Daily    Cholecalciferol (Vitamin D3) 50 MCG (2000 UT) TABS No dose, route, or frequency recorded.    cyanocobalamin 1,000 mcg/mL 1 IM injection every month    dicyclomine (BENTYL) 20 mg, Oral, 3 times daily before meals    diphenoxylate-atropine (LOMOTIL) 2.5-0.025 mg per tablet 1 tablet, Oral, 4 times daily PRN    escitalopram (LEXAPRO) 20 mg, Oral, Daily    esomeprazole (NEXIUM) 40 mg, Oral, Daily    etonogestrel-ethinyl estradiol (NuvaRing) 0.12-0.015 MG/24HR vaginal ring Insert ring for 21 days then remove for one week.    FeroSul 325 mg, Oral, Daily    gabapentin (NEURONTIN) 300 mg, Oral, 3 times daily    Galcanezumab-gnlm (Emgality) 120 MG/ML SOAJ One ml subcutaneous on the right thigh and 1 ml subcutaneous on the left thigh at the same time for 1 dose    Galcanezumab-gnlm 120 mg, Subcutaneous, Every 30 days    indomethacin (INDOCIN) 25 mg capsule 1-2 tabs BID prn severe headache with food. Hold toradol.    loperamide (IMODIUM) 2 mg, Oral, 4 times daily PRN    magnesium Oxide (MAG-OX) 400 mg, Oral, Daily    methocarbamol (ROBAXIN) 500 mg, Oral, 3 times daily    nystatin (MYCOSTATIN) cream Topical, 2 times daily    OLANZapine (ZYPREXA) 7.5 mg, Oral, Daily at bedtime, Do not take with reglan.     "onabotulinumtoxin A (BOTOX) 100 units Inject as directed    ondansetron (ZOFRAN) 4 mg, Oral, Every 6 hours    pramipexole (MIRAPEX) 0.25 mg tablet     prazosin (MINIPRESS) 2 mg, Oral, Daily at bedtime    RABEprazole (ACIPHEX) 20 mg, Oral, 2 times daily    Semaglutide-Weight Management (Wegovy) 0.5 MG/0.5ML Inject 0.5 mg under the skin weekly    SODIUM FLUORIDE, DENTAL GEL, 1.1 % GEL After brushing thoroughly with toothpaste, rinse as usual. Apply a thin ribbon of gel to the teeth with a toothbrush  once daily for at least one minute, preferably at bedtime.After use, expectorate gel. For best results, do not eat, drink or rinse for 30 minutes.    Trudhesa 0.725 MG/ACT AERS 1 spray in each nostril for total dose of 1.45mg, may repeat 1 dose after 1 hour. Max 2 doses per 24 hours and 3 doses per week.    Vitamin B-12 500 mcg, Sublingual, Daily    Vitamin D3 2,000 Units, Oral, Daily     Allergies   Allergen Reactions    Cimzia [Certolizumab Pegol] Swelling    Desvenlafaxine Other (See Comments)     Other reaction(s): state of confusion    Ixekizumab Vomiting    Seasonal Ic [Octacosanol] Nasal Congestion    Tizanidine Anxiety        PHYSICAL EXAM      Objective   Blood pressure 136/75, pulse 91, temperature (!) 97.1 °F (36.2 °C), temperature source Temporal, resp. rate 18, height 5' 3\" (1.6 m), weight 111 kg (245 lb), last menstrual period 11/01/2023, SpO2 98%, not currently breastfeeding. Body mass index is 43.4 kg/m².    General Appearance:   Alert, cooperative, no distress   Lungs:   Equal chest rise, respirations unlabored    Heart:   Regular rate and rhythm   Abdomen:   Soft, non-tender, non-distended; normal bowel sounds; no masses, no organomegaly    Extremities:   No edema       ASSESSMENT & PLAN     This is a 34 y.o. year old female here for EGD and colonoscopy, and she is stable and optimized for her procedure.      Benitez Whitney D.O.  [facility]  Division of Gastroenterology & " Hepatology  Available on TigerText  [email or web address]    ** Please Note: This note is constructed using a voice recognition dictation system. **

## 2024-11-01 NOTE — ANESTHESIA POSTPROCEDURE EVALUATION
Post-Op Assessment Note    Last Filed PACU Vitals:  Vitals Value Taken Time   Temp     Pulse 90    /90    Resp 12    SpO2 98        Modified Jasper:  Activity: 2 (11/1/2024 10:06 AM)  Respiration: 2 (11/1/2024 10:06 AM)  Circulation: 2 (11/1/2024 10:06 AM)  Consciousness: 2 (11/1/2024 10:06 AM)  Oxygen Saturation: 2 (11/1/2024 10:06 AM)  Modified Jasper Score: 10 (11/1/2024 10:06 AM)

## 2024-11-05 ENCOUNTER — TELEPHONE (OUTPATIENT)
Dept: NEUROLOGY | Facility: CLINIC | Age: 34
End: 2024-11-05

## 2024-11-05 NOTE — TELEPHONE ENCOUNTER
Botox number of units: 200  Botox quantity: 1  Arrived at what location: [facility]  Botox at Correct Administering Location: YES  NDC number:[id number]  Lot number:[id number]  Expiration Date: 03/27  Appt notes indicate correct medication:  YES

## 2024-11-06 PROCEDURE — 88305 TISSUE EXAM BY PATHOLOGIST: CPT | Performed by: PATHOLOGY

## 2024-11-08 ENCOUNTER — TELEMEDICINE (OUTPATIENT)
Dept: BEHAVIORAL/MENTAL HEALTH CLINIC | Facility: CLINIC | Age: 34
End: 2024-11-08
Payer: COMMERCIAL

## 2024-11-08 DIAGNOSIS — F41.1 GENERALIZED ANXIETY DISORDER: Chronic | ICD-10-CM

## 2024-11-08 DIAGNOSIS — F33.1 DEPRESSION, MAJOR, RECURRENT, MODERATE (HCC): Primary | Chronic | ICD-10-CM

## 2024-11-08 PROCEDURE — 90837 PSYTX W PT 60 MINUTES: CPT | Performed by: SOCIAL WORKER

## 2024-11-08 NOTE — PROGRESS NOTES
Ambulatory Visit  Name: Cruz Schneider      : 1990      MRN: [id number]  Encounter Provider: Kulwinder Nunn MD  Encounter Date: 2024   Encounter department: [facility] [place]    Assessment & Plan  Biliary dyskinesia  The patient is a pleasant 34-year-old female returning to the office post EGD and colonoscopy to reassess her symptom complex and decide on indications for cholecystectomy.    After thorough history, physical examination and review the recent testing laparoscopically cystectomy with intraoperative cholangiogram was discussed.    The options benefits risks and alternatives to surgery were reviewed.    The patient understands that no guarantees could be made regarding the complete relief of symptoms following her surgery.    The technical details laparoscopic cholecystectomy with intraoperative cholangiogram were explained as were the risks related to anesthesia, bleeding, infection, postoperative bile leak and bile duct injury necessitating additional surgical interventions.  All questions answered to the satisfaction of the patient and informed consent obtained to proceed.\           History of Present Illness     Cruz Schneider is a 34 y.o. female who presents for a 3 week follow up on gallbladder symptoms/ c-scop 24. Pt states she suffers from nausea, diarrhea, pain acorss the upper mid abd that usually happens after eating but she is able to tolerate fluids. Pt also states she suffers from dehydration. Pt states certain abx gives her diarrhea or a yeast infection. Pt denies any blood thinners. ROXANNE Hernandez  History obtained from : patient  Review of Systems   Constitutional:  Negative for chills and fever.   HENT:  Negative for ear pain and sore throat.    Eyes:  Negative for pain and visual disturbance.   Respiratory:  Negative for cough and shortness of breath.    Cardiovascular:  Negative for chest pain and palpitations.   Gastrointestinal:  Positive  for abdominal pain, constipation and diarrhea. Negative for vomiting.   Genitourinary:  Negative for dysuria and hematuria.   Musculoskeletal:  Negative for arthralgias and back pain.   Skin:  Negative for color change and rash.   Neurological:  Negative for seizures and syncope.   All other systems reviewed and are negative.    Pertinent Medical History         Medical History Reviewed by provider this encounter:       Past Medical History   Past Medical History:   Diagnosis Date    Abnormal Pap smear of cervix     Allergic     Anxiety     Arthritis     Dental caries     Depression     Fibromyalgia, primary     GERD (gastroesophageal reflux disease)     Hypertension     IBS (irritable bowel syndrome)     Migraine     Obesity     Vitamin B12 deficiency      Past Surgical History:   Procedure Laterality Date    COLONOSCOPY N/A 5/8/2018    Procedure: COLONOSCOPY;  Surgeon: Stephanie Alston MD;  Location: [facility] GI LAB;  Service: Gastroenterology    GA EXC B9 LESION MRGN XCP SK TG S/N/H/F/G > 4.0CM N/A 7/1/2021    Procedure: EXCISION OF PILAR SCALP CYST X 2 AND RIGHT INNER GROIN NEVVUS;  Surgeon: Denis Barron MD;  Location:  MAIN OR;  Service: Plastics    GA REPAIR COMPLEX SCALP/ARM/LEG 2.6-7.5 CM N/A 7/1/2021    Procedure: CLOSURE WOUND SCALP X 2 AND RIGHT INNER GROIN;  Surgeon: Denis Barron MD;  Location:  MAIN OR;  Service: Plastics    TONSILLECTOMY      WISDOM TOOTH EXTRACTION       Family History   Problem Relation Age of Onset    Rheum arthritis Mother     Psoriasis Mother     Other Mother     Hypertension Mother     Diabetes unspecified Mother     Sjogren's syndrome Mother     Alcohol abuse Mother     Drug abuse Mother     Anxiety disorder Father     Alcohol abuse Father     Lung cancer Maternal Grandfather     Cancer Other         bone    Diabetes Other     Other Other         High blood pressure    Depression Maternal Grandmother      Current Outpatient Medications on File Prior to Visit   Medication  Sig Dispense Refill    ALPRAZolam (XANAX) 0.5 mg tablet Take 1 tablet (0.5 mg total) by mouth 3 (three) times a day as needed for anxiety 90 tablet 2    ascorbic acid (VITAMIN C) 500 MG tablet Take 500 mg by mouth daily      bisacodyl (DULCOLAX) 5 mg EC tablet Take as directed by GI office 4 tablet 0    Botox 200 units SOLR       buPROPion (WELLBUTRIN XL) 300 mg 24 hr tablet Take 1 tablet (300 mg total) by mouth daily 90 tablet 0    Cholecalciferol (Vitamin D3) 50 MCG (2000 UT) capsule Take 1 capsule (2,000 Units total) by mouth daily 90 capsule 1    dicyclomine (BENTYL) 10 mg capsule Take 2 capsules (20 mg total) by mouth 3 (three) times a day before meals 120 capsule 1    diphenoxylate-atropine (LOMOTIL) 2.5-0.025 mg per tablet Take 1 tablet by mouth 4 (four) times a day as needed for diarrhea 30 tablet 0    escitalopram (LEXAPRO) 20 mg tablet Take 1 tablet (20 mg total) by mouth daily 90 tablet 0    etonogestrel-ethinyl estradiol (NuvaRing) 0.12-0.015 MG/24HR vaginal ring Insert ring for 21 days then remove for one week. 3 each 4    FeroSul 325 (65 Fe) MG tablet Take 1 tablet (325 mg total) by mouth in the morning 90 tablet 1    gabapentin (NEURONTIN) 300 mg capsule Take 1 capsule (300 mg total) by mouth 3 (three) times a day 90 capsule 5    Galcanezumab-gnlm (Emgality) 120 MG/ML SOAJ One ml subcutaneous on the right thigh and 1 ml subcutaneous on the left thigh at the same time for 1 dose 2 mL 0    indomethacin (INDOCIN) 25 mg capsule 1-2 tabs BID prn severe headache with food. Hold toradol. 30 capsule 6    loperamide (IMODIUM) 2 mg capsule Take 1 capsule (2 mg total) by mouth 4 (four) times a day as needed for diarrhea 12 capsule 0    methocarbamol (ROBAXIN) 500 mg tablet Take 1 tablet (500 mg total) by mouth 3 (three) times a day 270 tablet 1    nystatin (MYCOSTATIN) cream Apply topically 2 (two) times a day 15 g 0    OLANZapine (ZyPREXA) 7.5 mg tablet Take 1 tablet (7.5 mg total) by mouth daily at bedtime Do  not take with reglan. 90 tablet 0    ondansetron (ZOFRAN) 4 mg tablet Take 1 tablet (4 mg total) by mouth every 6 (six) hours 30 tablet 2    pramipexole (MIRAPEX) 0.25 mg tablet       prazosin (MINIPRESS) 2 mg capsule Take 1 capsule (2 mg total) by mouth daily at bedtime 90 capsule 0    RABEprazole (ACIPHEX) 20 MG tablet Take 1 tablet (20 mg total) by mouth 2 (two) times a day 60 tablet 3    Trudhesa 0.725 MG/ACT AERS 1 spray in each nostril for total dose of 1.45mg, may repeat 1 dose after 1 hour. Max 2 doses per 24 hours and 3 doses per week. 12 mL 2    Cholecalciferol (Vitamin D3) 50 MCG (2000 UT) TABS  (Patient not taking: Reported on 10/7/2024)      Cyanocobalamin (Vitamin B-12) 500 MCG SUBL Place 1 tablet (500 mcg total) under the tongue in the morning 30 tablet 2    cyanocobalamin 1,000 mcg/mL 1 IM injection every month 3 mL 3    esomeprazole (NexIUM) 40 MG capsule Take 1 capsule (40 mg total) by mouth daily (Patient not taking: Reported on 10/11/2024) 90 capsule 3    Galcanezumab-gnlm 120 MG/ML SOAJ Inject 120 mg under the skin every 30 (thirty) days (Patient not taking: Reported on 10/11/2024) 1 mL 11    magnesium Oxide (MAG-OX) 400 mg TABS Take 1 tablet (400 mg total) by mouth daily 30 tablet 5    onabotulinumtoxin A (BOTOX) 100 units Inject as directed (Patient not taking: Reported on 10/11/2024)      Semaglutide-Weight Management (Wegovy) 0.5 MG/0.5ML Inject 0.5 mg under the skin weekly (Patient not taking: Reported on 11/11/2024) 2 mL 0    SODIUM FLUORIDE, DENTAL GEL, 1.1 % GEL After brushing thoroughly with toothpaste, rinse as usual. Apply a thin ribbon of gel to the teeth with a toothbrush  once daily for at least one minute, preferably at bedtime.After use, expectorate gel. For best results, do not eat, drink or rinse for 30 minutes. (Patient not taking: Reported on 10/21/2024) 100 mL 0     Current Facility-Administered Medications on File Prior to Visit   Medication Dose Route Frequency Provider  Last Rate Last Admin    cyanocobalamin injection 1,000 mcg  1,000 mcg Intramuscular Q30 Days Oni Beckham, DO   1,000 mcg at 03/14/24 1450     Allergies   Allergen Reactions    Cimzia [Certolizumab Pegol] Swelling    Desvenlafaxine Other (See Comments)     Other reaction(s): state of confusion    Ixekizumab Vomiting    Seasonal Ic [Octacosanol] Nasal Congestion    Tizanidine Anxiety      Current Outpatient Medications on File Prior to Visit   Medication Sig Dispense Refill    ALPRAZolam (XANAX) 0.5 mg tablet Take 1 tablet (0.5 mg total) by mouth 3 (three) times a day as needed for anxiety 90 tablet 2    ascorbic acid (VITAMIN C) 500 MG tablet Take 500 mg by mouth daily      bisacodyl (DULCOLAX) 5 mg EC tablet Take as directed by GI office 4 tablet 0    Botox 200 units SOLR       buPROPion (WELLBUTRIN XL) 300 mg 24 hr tablet Take 1 tablet (300 mg total) by mouth daily 90 tablet 0    Cholecalciferol (Vitamin D3) 50 MCG (2000 UT) capsule Take 1 capsule (2,000 Units total) by mouth daily 90 capsule 1    dicyclomine (BENTYL) 10 mg capsule Take 2 capsules (20 mg total) by mouth 3 (three) times a day before meals 120 capsule 1    diphenoxylate-atropine (LOMOTIL) 2.5-0.025 mg per tablet Take 1 tablet by mouth 4 (four) times a day as needed for diarrhea 30 tablet 0    escitalopram (LEXAPRO) 20 mg tablet Take 1 tablet (20 mg total) by mouth daily 90 tablet 0    etonogestrel-ethinyl estradiol (NuvaRing) 0.12-0.015 MG/24HR vaginal ring Insert ring for 21 days then remove for one week. 3 each 4    FeroSul 325 (65 Fe) MG tablet Take 1 tablet (325 mg total) by mouth in the morning 90 tablet 1    gabapentin (NEURONTIN) 300 mg capsule Take 1 capsule (300 mg total) by mouth 3 (three) times a day 90 capsule 5    Galcanezumab-gnlm (Emgality) 120 MG/ML SOAJ One ml subcutaneous on the right thigh and 1 ml subcutaneous on the left thigh at the same time for 1 dose 2 mL 0    indomethacin (INDOCIN) 25 mg capsule 1-2 tabs BID prn severe  headache with food. Hold toradol. 30 capsule 6    loperamide (IMODIUM) 2 mg capsule Take 1 capsule (2 mg total) by mouth 4 (four) times a day as needed for diarrhea 12 capsule 0    methocarbamol (ROBAXIN) 500 mg tablet Take 1 tablet (500 mg total) by mouth 3 (three) times a day 270 tablet 1    nystatin (MYCOSTATIN) cream Apply topically 2 (two) times a day 15 g 0    OLANZapine (ZyPREXA) 7.5 mg tablet Take 1 tablet (7.5 mg total) by mouth daily at bedtime Do not take with reglan. 90 tablet 0    ondansetron (ZOFRAN) 4 mg tablet Take 1 tablet (4 mg total) by mouth every 6 (six) hours 30 tablet 2    pramipexole (MIRAPEX) 0.25 mg tablet       prazosin (MINIPRESS) 2 mg capsule Take 1 capsule (2 mg total) by mouth daily at bedtime 90 capsule 0    RABEprazole (ACIPHEX) 20 MG tablet Take 1 tablet (20 mg total) by mouth 2 (two) times a day 60 tablet 3    Trudhesa 0.725 MG/ACT AERS 1 spray in each nostril for total dose of 1.45mg, may repeat 1 dose after 1 hour. Max 2 doses per 24 hours and 3 doses per week. 12 mL 2    Cholecalciferol (Vitamin D3) 50 MCG (2000 UT) TABS  (Patient not taking: Reported on 10/7/2024)      Cyanocobalamin (Vitamin B-12) 500 MCG SUBL Place 1 tablet (500 mcg total) under the tongue in the morning 30 tablet 2    cyanocobalamin 1,000 mcg/mL 1 IM injection every month 3 mL 3    esomeprazole (NexIUM) 40 MG capsule Take 1 capsule (40 mg total) by mouth daily (Patient not taking: Reported on 10/11/2024) 90 capsule 3    Galcanezumab-gnlm 120 MG/ML SOAJ Inject 120 mg under the skin every 30 (thirty) days (Patient not taking: Reported on 10/11/2024) 1 mL 11    magnesium Oxide (MAG-OX) 400 mg TABS Take 1 tablet (400 mg total) by mouth daily 30 tablet 5    onabotulinumtoxin A (BOTOX) 100 units Inject as directed (Patient not taking: Reported on 10/11/2024)      Semaglutide-Weight Management (Wegovy) 0.5 MG/0.5ML Inject 0.5 mg under the skin weekly (Patient not taking: Reported on 11/11/2024) 2 mL 0    SODIUM  "FLUORIDE, DENTAL GEL, 1.1 % GEL After brushing thoroughly with toothpaste, rinse as usual. Apply a thin ribbon of gel to the teeth with a toothbrush  once daily for at least one minute, preferably at bedtime.After use, expectorate gel. For best results, do not eat, drink or rinse for 30 minutes. (Patient not taking: Reported on 10/21/2024) 100 mL 0     Current Facility-Administered Medications on File Prior to Visit   Medication Dose Route Frequency Provider Last Rate Last Admin    cyanocobalamin injection 1,000 mcg  1,000 mcg Intramuscular Q30 Days Oni Beckham, DO   1,000 mcg at 03/14/24 1450      Social History     Tobacco Use    Smoking status: Never     Passive exposure: Past    Smokeless tobacco: Never   Vaping Use    Vaping status: Never Used   Substance and Sexual Activity    Alcohol use: Not Currently     Comment: rarely    Drug use: Yes     Frequency: 2.0 times per week     Types: Marijuana     Comment: medical marijuana (vape)    Sexual activity: Not Currently     Partners: Male     Comment: Nuva ring         Objective     /88 (BP Location: Left arm, Patient Position: Sitting, Cuff Size: Large)   Pulse 84   Temp 98.4 °F (36.9 °C) (Temporal)   Resp 18   Ht 5' 3\" (1.6 m)   Wt 109 kg (240 lb)   LMP 11/01/2023 (Approximate)   SpO2 98%   BMI 42.51 kg/m²     Physical Exam  Vitals and nursing note reviewed.   Constitutional:       General: She is not in acute distress.     Appearance: She is well-developed.   HENT:      Head: Normocephalic and atraumatic.   Eyes:      Conjunctiva/sclera: Conjunctivae normal.   Cardiovascular:      Rate and Rhythm: Normal rate and regular rhythm.      Heart sounds: No murmur heard.  Pulmonary:      Effort: Pulmonary effort is normal. No respiratory distress.      Breath sounds: Normal breath sounds.   Abdominal:      Palpations: Abdomen is soft.      Tenderness: There is no abdominal tenderness.      Comments: Mild nonspecific tenderness to percussion and palpation. "   Musculoskeletal:         General: No swelling.      Cervical back: Neck supple.   Skin:     General: Skin is warm and dry.      Capillary Refill: Capillary refill takes less than 2 seconds.   Neurological:      Mental Status: She is alert.   Psychiatric:         Mood and Affect: Mood normal.       Administrative Statements   I have spent a total time of 20 minutes in caring for this patient on the day of the visit/encounter including Risks and benefits of tx options.

## 2024-11-11 ENCOUNTER — OFFICE VISIT (OUTPATIENT)
Dept: SURGERY | Facility: CLINIC | Age: 34
End: 2024-11-11
Payer: COMMERCIAL

## 2024-11-11 VITALS
RESPIRATION RATE: 18 BRPM | HEART RATE: 84 BPM | DIASTOLIC BLOOD PRESSURE: 88 MMHG | SYSTOLIC BLOOD PRESSURE: 124 MMHG | WEIGHT: 240 LBS | OXYGEN SATURATION: 98 % | HEIGHT: 63 IN | BODY MASS INDEX: 42.52 KG/M2 | TEMPERATURE: 98.4 F

## 2024-11-11 DIAGNOSIS — K82.8 BILIARY DYSKINESIA: Primary | ICD-10-CM

## 2024-11-11 PROCEDURE — 99214 OFFICE O/P EST MOD 30 MIN: CPT | Performed by: SURGERY

## 2024-11-11 RX ORDER — SODIUM CHLORIDE 9 MG/ML
125 INJECTION, SOLUTION INTRAVENOUS CONTINUOUS
OUTPATIENT
Start: 2024-11-11

## 2024-11-11 NOTE — ASSESSMENT & PLAN NOTE
The patient is a pleasant 34-year-old female returning to the office post EGD and colonoscopy to reassess her symptom complex and decide on indications for cholecystectomy.    After thorough history, physical examination and review the recent testing laparoscopically cystectomy with intraoperative cholangiogram was discussed.    The options benefits risks and alternatives to surgery were reviewed.    The patient understands that no guarantees could be made regarding the complete relief of symptoms following her surgery.    The technical details laparoscopic cholecystectomy with intraoperative cholangiogram were explained as were the risks related to anesthesia, bleeding, infection, postoperative bile leak and bile duct injury necessitating additional surgical interventions.  All questions answered to the satisfaction of the patient and informed consent obtained to proceed.\

## 2024-11-11 NOTE — H&P
Ambulatory Visit  Name: Cruz Schneider      : 1990      MRN: [id number]  Encounter Provider: Kulwinder Nunn MD  Encounter Date: 2024   Encounter department: [facility] SURGERY [place]    Assessment & Plan  Biliary dyskinesia  The patient is a pleasant 34-year-old female returning to the office post EGD and colonoscopy to reassess her symptom complex and decide on indications for cholecystectomy.    After thorough history, physical examination and review the recent testing laparoscopically cystectomy with intraoperative cholangiogram was discussed.    The options benefits risks and alternatives to surgery were reviewed.    The patient understands that no guarantees could be made regarding the complete relief of symptoms following her surgery.    The technical details laparoscopic cholecystectomy with intraoperative cholangiogram were explained as were the risks related to anesthesia, bleeding, infection, postoperative bile leak and bile duct injury necessitating additional surgical interventions.  All questions answered to the satisfaction of the patient and informed consent obtained to proceed.\           History of Present Illness    Cruz Schneider is a 34 y.o. female who presents for a 3 week follow up on gallbladder symptoms/ c-scop 24. Pt states she suffers from nausea, diarrhea, pain acorss the upper mid abd that usually happens after eating but she is able to tolerate fluids. Pt also states she suffers from dehydration. Pt states certain abx gives her diarrhea or a yeast infection. Pt denies any blood thinners. ROXANNE Hernandez  History obtained from : patient  Review of Systems   Constitutional:  Negative for chills and fever.   HENT:  Negative for ear pain and sore throat.    Eyes:  Negative for pain and visual disturbance.   Respiratory:  Negative for cough and shortness of breath.    Cardiovascular:  Negative for chest pain and palpitations.   Gastrointestinal:  Positive  for abdominal pain, constipation and diarrhea. Negative for vomiting.   Genitourinary:  Negative for dysuria and hematuria.   Musculoskeletal:  Negative for arthralgias and back pain.   Skin:  Negative for color change and rash.   Neurological:  Negative for seizures and syncope.   All other systems reviewed and are negative.    Pertinent Medical History        Medical History Reviewed by provider this encounter:       Past Medical History  Past Medical History:   Diagnosis Date    Abnormal Pap smear of cervix     Allergic     Anxiety     Arthritis     Dental caries     Depression     Fibromyalgia, primary     GERD (gastroesophageal reflux disease)     Hypertension     IBS (irritable bowel syndrome)     Migraine     Obesity     Vitamin B12 deficiency      Past Surgical History:   Procedure Laterality Date    COLONOSCOPY N/A 5/8/2018    Procedure: COLONOSCOPY;  Surgeon: Stephanie Alston MD;  Location: [facility] GI LAB;  Service: Gastroenterology    MA EXC B9 LESION MRGN XCP SK TG S/N/H/F/G > 4.0CM N/A 7/1/2021    Procedure: EXCISION OF PILAR SCALP CYST X 2 AND RIGHT INNER GROIN NEVVUS;  Surgeon: Denis Barron MD;  Location:  MAIN OR;  Service: Plastics    MA REPAIR COMPLEX SCALP/ARM/LEG 2.6-7.5 CM N/A 7/1/2021    Procedure: CLOSURE WOUND SCALP X 2 AND RIGHT INNER GROIN;  Surgeon: Denis Barron MD;  Location:  MAIN OR;  Service: Plastics    TONSILLECTOMY      WISDOM TOOTH EXTRACTION       Family History   Problem Relation Age of Onset    Rheum arthritis Mother     Psoriasis Mother     Other Mother     Hypertension Mother     Diabetes unspecified Mother     Sjogren's syndrome Mother     Alcohol abuse Mother     Drug abuse Mother     Anxiety disorder Father     Alcohol abuse Father     Lung cancer Maternal Grandfather     Cancer Other         bone    Diabetes Other     Other Other         High blood pressure    Depression Maternal Grandmother      Current Outpatient Medications on File Prior to Visit   Medication  Sig Dispense Refill    ALPRAZolam (XANAX) 0.5 mg tablet Take 1 tablet (0.5 mg total) by mouth 3 (three) times a day as needed for anxiety 90 tablet 2    ascorbic acid (VITAMIN C) 500 MG tablet Take 500 mg by mouth daily      bisacodyl (DULCOLAX) 5 mg EC tablet Take as directed by GI office 4 tablet 0    Botox 200 units SOLR       buPROPion (WELLBUTRIN XL) 300 mg 24 hr tablet Take 1 tablet (300 mg total) by mouth daily 90 tablet 0    Cholecalciferol (Vitamin D3) 50 MCG (2000 UT) capsule Take 1 capsule (2,000 Units total) by mouth daily 90 capsule 1    dicyclomine (BENTYL) 10 mg capsule Take 2 capsules (20 mg total) by mouth 3 (three) times a day before meals 120 capsule 1    diphenoxylate-atropine (LOMOTIL) 2.5-0.025 mg per tablet Take 1 tablet by mouth 4 (four) times a day as needed for diarrhea 30 tablet 0    escitalopram (LEXAPRO) 20 mg tablet Take 1 tablet (20 mg total) by mouth daily 90 tablet 0    etonogestrel-ethinyl estradiol (NuvaRing) 0.12-0.015 MG/24HR vaginal ring Insert ring for 21 days then remove for one week. 3 each 4    FeroSul 325 (65 Fe) MG tablet Take 1 tablet (325 mg total) by mouth in the morning 90 tablet 1    gabapentin (NEURONTIN) 300 mg capsule Take 1 capsule (300 mg total) by mouth 3 (three) times a day 90 capsule 5    Galcanezumab-gnlm (Emgality) 120 MG/ML SOAJ One ml subcutaneous on the right thigh and 1 ml subcutaneous on the left thigh at the same time for 1 dose 2 mL 0    indomethacin (INDOCIN) 25 mg capsule 1-2 tabs BID prn severe headache with food. Hold toradol. 30 capsule 6    loperamide (IMODIUM) 2 mg capsule Take 1 capsule (2 mg total) by mouth 4 (four) times a day as needed for diarrhea 12 capsule 0    methocarbamol (ROBAXIN) 500 mg tablet Take 1 tablet (500 mg total) by mouth 3 (three) times a day 270 tablet 1    nystatin (MYCOSTATIN) cream Apply topically 2 (two) times a day 15 g 0    OLANZapine (ZyPREXA) 7.5 mg tablet Take 1 tablet (7.5 mg total) by mouth daily at bedtime Do  not take with reglan. 90 tablet 0    ondansetron (ZOFRAN) 4 mg tablet Take 1 tablet (4 mg total) by mouth every 6 (six) hours 30 tablet 2    pramipexole (MIRAPEX) 0.25 mg tablet       prazosin (MINIPRESS) 2 mg capsule Take 1 capsule (2 mg total) by mouth daily at bedtime 90 capsule 0    RABEprazole (ACIPHEX) 20 MG tablet Take 1 tablet (20 mg total) by mouth 2 (two) times a day 60 tablet 3    Trudhesa 0.725 MG/ACT AERS 1 spray in each nostril for total dose of 1.45mg, may repeat 1 dose after 1 hour. Max 2 doses per 24 hours and 3 doses per week. 12 mL 2    Cholecalciferol (Vitamin D3) 50 MCG (2000 UT) TABS  (Patient not taking: Reported on 10/7/2024)      Cyanocobalamin (Vitamin B-12) 500 MCG SUBL Place 1 tablet (500 mcg total) under the tongue in the morning 30 tablet 2    cyanocobalamin 1,000 mcg/mL 1 IM injection every month 3 mL 3    esomeprazole (NexIUM) 40 MG capsule Take 1 capsule (40 mg total) by mouth daily (Patient not taking: Reported on 10/11/2024) 90 capsule 3    Galcanezumab-gnlm 120 MG/ML SOAJ Inject 120 mg under the skin every 30 (thirty) days (Patient not taking: Reported on 10/11/2024) 1 mL 11    magnesium Oxide (MAG-OX) 400 mg TABS Take 1 tablet (400 mg total) by mouth daily 30 tablet 5    onabotulinumtoxin A (BOTOX) 100 units Inject as directed (Patient not taking: Reported on 10/11/2024)      Semaglutide-Weight Management (Wegovy) 0.5 MG/0.5ML Inject 0.5 mg under the skin weekly (Patient not taking: Reported on 11/11/2024) 2 mL 0    SODIUM FLUORIDE, DENTAL GEL, 1.1 % GEL After brushing thoroughly with toothpaste, rinse as usual. Apply a thin ribbon of gel to the teeth with a toothbrush  once daily for at least one minute, preferably at bedtime.After use, expectorate gel. For best results, do not eat, drink or rinse for 30 minutes. (Patient not taking: Reported on 10/21/2024) 100 mL 0     Current Facility-Administered Medications on File Prior to Visit   Medication Dose Route Frequency Provider  Last Rate Last Admin    cyanocobalamin injection 1,000 mcg  1,000 mcg Intramuscular Q30 Days Oni Beckham, DO   1,000 mcg at 03/14/24 1450     Allergies   Allergen Reactions    Cimzia [Certolizumab Pegol] Swelling    Desvenlafaxine Other (See Comments)     Other reaction(s): state of confusion    Ixekizumab Vomiting    Seasonal Ic [Octacosanol] Nasal Congestion    Tizanidine Anxiety      Current Outpatient Medications on File Prior to Visit   Medication Sig Dispense Refill    ALPRAZolam (XANAX) 0.5 mg tablet Take 1 tablet (0.5 mg total) by mouth 3 (three) times a day as needed for anxiety 90 tablet 2    ascorbic acid (VITAMIN C) 500 MG tablet Take 500 mg by mouth daily      bisacodyl (DULCOLAX) 5 mg EC tablet Take as directed by GI office 4 tablet 0    Botox 200 units SOLR       buPROPion (WELLBUTRIN XL) 300 mg 24 hr tablet Take 1 tablet (300 mg total) by mouth daily 90 tablet 0    Cholecalciferol (Vitamin D3) 50 MCG (2000 UT) capsule Take 1 capsule (2,000 Units total) by mouth daily 90 capsule 1    dicyclomine (BENTYL) 10 mg capsule Take 2 capsules (20 mg total) by mouth 3 (three) times a day before meals 120 capsule 1    diphenoxylate-atropine (LOMOTIL) 2.5-0.025 mg per tablet Take 1 tablet by mouth 4 (four) times a day as needed for diarrhea 30 tablet 0    escitalopram (LEXAPRO) 20 mg tablet Take 1 tablet (20 mg total) by mouth daily 90 tablet 0    etonogestrel-ethinyl estradiol (NuvaRing) 0.12-0.015 MG/24HR vaginal ring Insert ring for 21 days then remove for one week. 3 each 4    FeroSul 325 (65 Fe) MG tablet Take 1 tablet (325 mg total) by mouth in the morning 90 tablet 1    gabapentin (NEURONTIN) 300 mg capsule Take 1 capsule (300 mg total) by mouth 3 (three) times a day 90 capsule 5    Galcanezumab-gnlm (Emgality) 120 MG/ML SOAJ One ml subcutaneous on the right thigh and 1 ml subcutaneous on the left thigh at the same time for 1 dose 2 mL 0    indomethacin (INDOCIN) 25 mg capsule 1-2 tabs BID prn severe  headache with food. Hold toradol. 30 capsule 6    loperamide (IMODIUM) 2 mg capsule Take 1 capsule (2 mg total) by mouth 4 (four) times a day as needed for diarrhea 12 capsule 0    methocarbamol (ROBAXIN) 500 mg tablet Take 1 tablet (500 mg total) by mouth 3 (three) times a day 270 tablet 1    nystatin (MYCOSTATIN) cream Apply topically 2 (two) times a day 15 g 0    OLANZapine (ZyPREXA) 7.5 mg tablet Take 1 tablet (7.5 mg total) by mouth daily at bedtime Do not take with reglan. 90 tablet 0    ondansetron (ZOFRAN) 4 mg tablet Take 1 tablet (4 mg total) by mouth every 6 (six) hours 30 tablet 2    pramipexole (MIRAPEX) 0.25 mg tablet       prazosin (MINIPRESS) 2 mg capsule Take 1 capsule (2 mg total) by mouth daily at bedtime 90 capsule 0    RABEprazole (ACIPHEX) 20 MG tablet Take 1 tablet (20 mg total) by mouth 2 (two) times a day 60 tablet 3    Trudhesa 0.725 MG/ACT AERS 1 spray in each nostril for total dose of 1.45mg, may repeat 1 dose after 1 hour. Max 2 doses per 24 hours and 3 doses per week. 12 mL 2    Cholecalciferol (Vitamin D3) 50 MCG (2000 UT) TABS  (Patient not taking: Reported on 10/7/2024)      Cyanocobalamin (Vitamin B-12) 500 MCG SUBL Place 1 tablet (500 mcg total) under the tongue in the morning 30 tablet 2    cyanocobalamin 1,000 mcg/mL 1 IM injection every month 3 mL 3    esomeprazole (NexIUM) 40 MG capsule Take 1 capsule (40 mg total) by mouth daily (Patient not taking: Reported on 10/11/2024) 90 capsule 3    Galcanezumab-gnlm 120 MG/ML SOAJ Inject 120 mg under the skin every 30 (thirty) days (Patient not taking: Reported on 10/11/2024) 1 mL 11    magnesium Oxide (MAG-OX) 400 mg TABS Take 1 tablet (400 mg total) by mouth daily 30 tablet 5    onabotulinumtoxin A (BOTOX) 100 units Inject as directed (Patient not taking: Reported on 10/11/2024)      Semaglutide-Weight Management (Wegovy) 0.5 MG/0.5ML Inject 0.5 mg under the skin weekly (Patient not taking: Reported on 11/11/2024) 2 mL 0    SODIUM  "FLUORIDE, DENTAL GEL, 1.1 % GEL After brushing thoroughly with toothpaste, rinse as usual. Apply a thin ribbon of gel to the teeth with a toothbrush  once daily for at least one minute, preferably at bedtime.After use, expectorate gel. For best results, do not eat, drink or rinse for 30 minutes. (Patient not taking: Reported on 10/21/2024) 100 mL 0     Current Facility-Administered Medications on File Prior to Visit   Medication Dose Route Frequency Provider Last Rate Last Admin    cyanocobalamin injection 1,000 mcg  1,000 mcg Intramuscular Q30 Days Oni Beckham, DO   1,000 mcg at 03/14/24 1450      Social History     Tobacco Use    Smoking status: Never     Passive exposure: Past    Smokeless tobacco: Never   Vaping Use    Vaping status: Never Used   Substance and Sexual Activity    Alcohol use: Not Currently     Comment: rarely    Drug use: Yes     Frequency: 2.0 times per week     Types: Marijuana     Comment: medical marijuana (vape)    Sexual activity: Not Currently     Partners: Male     Comment: Nuva ring         Objective    /88 (BP Location: Left arm, Patient Position: Sitting, Cuff Size: Large)   Pulse 84   Temp 98.4 °F (36.9 °C) (Temporal)   Resp 18   Ht 5' 3\" (1.6 m)   Wt 109 kg (240 lb)   LMP 11/01/2023 (Approximate)   SpO2 98%   BMI 42.51 kg/m²     Physical Exam  Vitals and nursing note reviewed.   Constitutional:       General: She is not in acute distress.     Appearance: She is well-developed.   HENT:      Head: Normocephalic and atraumatic.   Eyes:      Conjunctiva/sclera: Conjunctivae normal.   Cardiovascular:      Rate and Rhythm: Normal rate and regular rhythm.      Heart sounds: No murmur heard.  Pulmonary:      Effort: Pulmonary effort is normal. No respiratory distress.      Breath sounds: Normal breath sounds.   Abdominal:      Palpations: Abdomen is soft.      Tenderness: There is no abdominal tenderness.      Comments: Mild nonspecific tenderness to percussion and palpation. "   Musculoskeletal:         General: No swelling.      Cervical back: Neck supple.   Skin:     General: Skin is warm and dry.      Capillary Refill: Capillary refill takes less than 2 seconds.   Neurological:      Mental Status: She is alert.   Psychiatric:         Mood and Affect: Mood normal.       Administrative Statements  I have spent a total time of 20 minutes in caring for this patient on the day of the visit/encounter including Risks and benefits of tx options.

## 2024-11-12 ENCOUNTER — PROCEDURE VISIT (OUTPATIENT)
Dept: NEUROLOGY | Facility: CLINIC | Age: 34
End: 2024-11-12
Payer: COMMERCIAL

## 2024-11-12 VITALS — TEMPERATURE: 97.5 F | SYSTOLIC BLOOD PRESSURE: 127 MMHG | HEART RATE: 76 BPM | DIASTOLIC BLOOD PRESSURE: 80 MMHG

## 2024-11-12 DIAGNOSIS — G43.709 CHRONIC MIGRAINE WITHOUT AURA WITHOUT STATUS MIGRAINOSUS, NOT INTRACTABLE: Primary | ICD-10-CM

## 2024-11-12 PROCEDURE — 64615 CHEMODENERV MUSC MIGRAINE: CPT | Performed by: PHYSICIAN ASSISTANT

## 2024-11-12 NOTE — PROGRESS NOTES
Universal Protocol   Consent: Verbal consent obtained. Written consent obtained.  Risks and benefits: risks, benefits and alternatives were discussed  Consent given by: patient  Patient understanding: patient states understanding of the procedure being performed  Patient consent: the patient's understanding of the procedure matches consent given  Procedure consent: procedure consent matches procedure scheduled      Chemodenervation     Date/Time  11/12/2024 8:00 AM     Performed by  Jacki Perez PA-C   Authorized by  Jacki Perez PA-C     Pre-procedure details      Prepped With: Alcohol     Procedure details      Position:  Upright   Botox      Botox Type:  Type A    Brand:  Botox    mL's of Botulinum Toxin:  200    Final Concentration per CC:  100 units    Needle Gauge:  30 G 2.5 inch   Procedures      Botox Procedures: chronic headache      Indications: migraines     Injection Location      Head / Face:  L superior trapezius, R superior trapezius, L superior cervical paraspinal, R superior cervical paraspinal, L , R , procerus, L temporalis, R temporalis, R frontalis, L frontalis, R medial occipitalis and L medial occipitalis    L  injection amount:  5 unit(s)    R  injection amount:  5 unit(s)    L lateral frontalis:  5 unit(s)    R lateral frontalis:  5 unit(s)    L medial frontalis:  5 unit(s)    R medial frontalis:  5 unit(s)    L temporalis injection amount:  20 unit(s)    R temporalis injection amount:  20 unit(s)    Procerus injection amount:  5 unit(s)    L medial occipitalis injection amount:  15 unit(s)    R medial occipitalis injection amount:  15 unit(s)    L superior cervical paraspinal injection amount:  10 unit(s)    R superior cervical paraspinal injection amount:  10 unit(s)    L superior trapezius injection amount:  0 unit(s)    R superior trapezius injection amount:  0 unit(s)    Comments:  No units   Total Units      Total units used:  200    Total  units discarded:  0   Post-procedure details      Chemodenervation:  Chronic migraine    Facial Nerve Location::  Bilateral facial nerve    Patient tolerance of procedure:  Tolerated well, no immediate complications   Comments       Medically necessary:  - 30 units between the R and L headband region  - 35 units between each anterior temporalis and scalp  - 5 each masseter (10 total)  Avoided traps b/l.       Blood pressure 127/80, pulse 76, temperature 97.5 °F (36.4 °C), temperature source Temporal, last menstrual period 11/01/2023, not currently breastfeeding.    Pt reports less intense smell sensitivity, migraines are better/ less frequent.  Pt lost 20 lbs.  Recheck balance next visit.  Pt planning gall bladder surgery.

## 2024-11-13 ENCOUNTER — TELEMEDICINE (OUTPATIENT)
Dept: PSYCHIATRY | Facility: CLINIC | Age: 34
End: 2024-11-13
Payer: COMMERCIAL

## 2024-11-13 DIAGNOSIS — F33.1 DEPRESSION, MAJOR, RECURRENT, MODERATE (HCC): Primary | Chronic | ICD-10-CM

## 2024-11-13 DIAGNOSIS — G43.709 CHRONIC MIGRAINE WITHOUT AURA WITHOUT STATUS MIGRAINOSUS, NOT INTRACTABLE: ICD-10-CM

## 2024-11-13 DIAGNOSIS — F41.1 GENERALIZED ANXIETY DISORDER: Chronic | ICD-10-CM

## 2024-11-13 DIAGNOSIS — F41.0 PANIC DISORDER WITHOUT AGORAPHOBIA: ICD-10-CM

## 2024-11-13 PROCEDURE — 99214 OFFICE O/P EST MOD 30 MIN: CPT | Performed by: PSYCHIATRY & NEUROLOGY

## 2024-11-13 RX ORDER — ALPRAZOLAM 0.5 MG
0.5 TABLET ORAL 3 TIMES DAILY PRN
Qty: 90 TABLET | Refills: 2 | Status: SHIPPED | OUTPATIENT
Start: 2024-12-11

## 2024-11-13 RX ORDER — BUPROPION HYDROCHLORIDE 300 MG/1
300 TABLET ORAL DAILY
Qty: 90 TABLET | Refills: 0 | Status: SHIPPED | OUTPATIENT
Start: 2024-11-13

## 2024-11-13 RX ORDER — ESCITALOPRAM OXALATE 20 MG/1
20 TABLET ORAL DAILY
Qty: 90 TABLET | Refills: 0 | Status: SHIPPED | OUTPATIENT
Start: 2024-11-13

## 2024-11-13 RX ORDER — OLANZAPINE 7.5 MG/1
7.5 TABLET, FILM COATED ORAL
Qty: 90 TABLET | Refills: 0 | Status: SHIPPED | OUTPATIENT
Start: 2024-11-13

## 2024-11-13 NOTE — PSYCH
Virtual Regular Visit    Verification of patient location:    Patient is located at Home in the following state in which I hold an active license [place]      Assessment/Plan:    Problem List Items Addressed This Visit          Behavioral Health    Depression, major, recurrent, moderate (HCC) - Primary (Chronic)    Generalized anxiety disorder (Chronic)    Panic disorder without agoraphobia                     Reason for visit is   Medication Management     Encounter provider Aliyah Keating MD    Provider located at [address] [place] [address] [phone]      Recent Visits  No visits were found meeting these conditions.  Showing recent visits within past 7 days and meeting all other requirements  Today's Visits  Date Type Provider Dept   11/13/24 [facility]medicine [facility]   Showing today's visits and meeting all other requirements  Future Appointments  No visits were found meeting these conditions.  Showing future appointments within next 150 days and meeting all other requirements       The patient was identified by name and date of birth. Cruz Schneider was informed that this is a telemedicine visit and that the visit is being conducted throughthe Epic Embedded platform. She agrees to proceed..  My office door was closed. No one else was in the room.  She acknowledged consent and understanding of privacy and security of the video platform. The patient has agreed to participate and understands they can discontinue the visit at any time.    Patient is aware this is a billable service.     Subjective  Cruz Schneider is a 34 y.o. female with MDD,Panic do, BESSY .Stephanie remains compliant with her medications and denies side effects. No recent health changes or new medications.  She continues to meet with her counselor on a regular basis.      Patient stated she has been  dealing with a lot of physical pain due to ankylosing spondylitis and plantar fasciitis.   She is also taking medical cannabis which helps with her anxiety and also with her chronic pain.  Recently  her anxiety has been high and had Alprazolam increased to 0.5 mg po tid temporarily.   She has benefited from dose increase but still struggles with depressed mood.    She had  dose increase on Olanzapine to 7.5 mg po qhs. Lipids and A1C ordered and still pending.  She feels better after dose increase.  She  had trial of Phentermine for weight loss but experienced side effcts. She is considering trial of Wegovy instead.    Since last seen she stated her mother needs hysterectomy and she needs to get cholecystectomy due to digestive issues.  Stressors are situational and she feels confident both she and her mother will do well after surgery.        Agrees with above stated treatment and will schedule follow up in 3 months or sooner of needed.       Allergies   Allergen Reactions    Cimzia [Certolizumab Pegol] Swelling    Desvenlafaxine Other (See Comments)     Other reaction(s): state of confusion    Ixekizumab Vomiting    Seasonal Ic [Octacosanol] Nasal Congestion    Tizanidine Anxiety       Review of Systems    Mood Anxiety and Depression   Behavior Normal    Thought Content Disturbing Thoughts, Feelings   General Emotional Problems and Decreased Functioning   Personality Normal   Other Psych Symptoms Normal   Constitutional Negative   ENT Negative   Cardiovascular Negative   Respiratory Negative   Gastrointestinal Negative   Genitourinary Negative   Musculoskeletal Negative   Integumentary Negative   Neurological Negative   Endocrine Normal    Other Symptoms Normal        Laboratory Results:   Recent Labs (last 12 months):   Hospital Outpatient Visit on 11/01/2024   Component Date Value    EXT Preg Test, Ur 11/01/2024 Negative     Control 11/01/2024 Valid     Case Report 11/01/2024                      Value:Surgical  "Pathology Report                         Case: [id number]                                  Authorizing Provider:  Benitez Whitney DO              Collected:           11/01/2024 1046              Ordering Location:     [facility] [place] Received:            11/01/2024 1130                                     Endoscopy                                                                    Pathologist:           Filemon Johnson MD                                                           Specimens:   A) - Terminal Ileum, bx r/o ibd                                                                     B) - Colon, random bx r/o ibd                                                              Final Diagnosis 11/01/2024                      Value:A. Small intestine, \"Terminal ileum rule out IBD,\" Biopsy:  - Fragments of small intestinal mucosa with intact villous architecture and no increase in intraepithelial lymphocytes  - Negative for chronic or active ileitis, dysplasia or malignancy    B. Colon, \"Random colon biopsy rule out IBD,\" Biopsy:  - Benign colonic mucosa with intact glandular architecture  - Negative for lymphocytic, collagenous, or active colitis  - Negative for acute colitis  - Negative for granulomas, dysplasia, or carcinoma        Additional Information 11/01/2024                      Value:All reported additional testing was performed with appropriately reactive controls.  These tests were developed and their performance characteristics determined by [facility] or appropriate performing facility, though some tests may be performed on tissues which have not been validated for performance characteristics (such as staining performed on alcohol exposed cell blocks and decalcified tissues).  Results should be interpreted with caution and in the context of the patients’ clinical condition. These tests may not be cleared or approved by the U.S. Food and Drug Administration, though the FDA " "has determined that such clearance or approval is not necessary. These tests are used for clinical purposes and they should not be regarded as investigational or for research. This laboratory has been approved by CLIA 88, designated as a high-complexity laboratory and is qualified to perform these tests.  .Interpretation performed at [facility], [address] [facility] [address]        Gross Description 11/01/2024                      Value:A. The specimen is received in formalin, labeled with the patient's name and hospital number, and is designated \" terminal ileum rule out IBD\".  The specimen consists of 3 pale-tan soft tissue fragments measuring 0.3, 0.4 and 0.5 cm.  Entirely submitted. Screened cassette.  B. The specimen is received in formalin, labeled with the patient's name and hospital number, and is designated \" random colon biopsy rule out IBD\".  The specimen consists of multiple tan soft tissue fragments measuring in aggregate 0.6 x 0.6 x 0.1 cm.  Entirely submitted. Screened cassette.    Note: The estimated total formalin fixation time based upon information provided by the submitting clinician and the standard processing schedule is over 72 hours.    [facility]      Clinical Information 11/01/2024                      Value:FINDINGS:  · 2 cm sliding hiatal hernia (type I hiatal hernia) without Flako lesions present - GE junction 35 cm from the incisors, diaphragmatic impression 37 cm from the incisors, confirmed by retroflexion:  Hill classification: Grade I  · The upper third of the esophagus, middle third of the esophagus and lower third of the esophagus appeared normal. Z-line is 35 cm from the incisors.  · The fundus of the stomach, body of the stomach, greater curve of the stomach, lesser curve of the stomach, incisura, antrum, prepyloric region and pylorus appeared normal. Previous biopsies negative.  Repeat biopsies not performed.  · The duodenal bulb, 1st part of " the duodenum, 2nd part of the duodenum and major papilla appeared normal. Previous biopsies negative.  Repeat biopsies not performed.    FINDINGS:  · The terminal ileum and entire colon appeared normal.  · Performed multiple forceps biopsies in the terminal ileum to rule out IBD  · Performed multiple pancolonic forceps biopsies to rule                           out IBD     Admission on 10/13/2024, Discharged on 10/13/2024   Component Date Value    WBC 10/13/2024 10.75 (H)     RBC 10/13/2024 4.88     Hemoglobin 10/13/2024 13.7     Hematocrit 10/13/2024 41.8     MCV 10/13/2024 86     MCH 10/13/2024 28.1     MCHC 10/13/2024 32.8     RDW 10/13/2024 13.5     MPV 10/13/2024 8.7 (L)     Platelets 10/13/2024 494 (H)     nRBC 10/13/2024 0     Segmented % 10/13/2024 73     Immature Grans % 10/13/2024 0     Lymphocytes % 10/13/2024 18     Monocytes % 10/13/2024 7     Eosinophils Relative 10/13/2024 1     Basophils Relative 10/13/2024 1     Absolute Neutrophils 10/13/2024 7.90 (H)     Absolute Immature Grans 10/13/2024 0.02     Absolute Lymphocytes 10/13/2024 1.95     Absolute Monocytes 10/13/2024 0.72     Eosinophils Absolute 10/13/2024 0.10     Basophils Absolute 10/13/2024 0.06     Sodium 10/13/2024 134 (L)     Potassium 10/13/2024 3.7     Chloride 10/13/2024 103     CO2 10/13/2024 22     ANION GAP 10/13/2024 9     BUN 10/13/2024 7     Creatinine 10/13/2024 0.73     Glucose 10/13/2024 98     Calcium 10/13/2024 9.4     AST 10/13/2024 15     ALT 10/13/2024 15     Alkaline Phosphatase 10/13/2024 66     Total Protein 10/13/2024 7.7     Albumin 10/13/2024 4.0     Total Bilirubin 10/13/2024 0.60     eGFR 10/13/2024 108     Lipase 10/13/2024 27     SARS COV Rapid Antigen 10/13/2024 Negative     Influenza A Rapid Antigen 10/13/2024 Negative     Influenza B Rapid Antigen 10/13/2024 Negative     Color, UA 10/13/2024 Yellow     Clarity, UA 10/13/2024 Clear     Specific Gravity, UA 10/13/2024 1.025     pH, UA 10/13/2024 6.0      Leukocytes, UA 10/13/2024 Negative     Nitrite, UA 10/13/2024 Negative     Protein, UA 10/13/2024 Negative     Glucose, UA 10/13/2024 Negative     Ketones, UA 10/13/2024 15 (1+) (A)     Urobilinogen, UA 10/13/2024 0.2     Bilirubin, UA 10/13/2024 Negative     Occult Blood, UA 10/13/2024 Negative     Urine Culture 10/13/2024 10,000-19,000 cfu/ml    Admission on 10/10/2024, Discharged on 10/10/2024   Component Date Value    Color, UA 10/10/2024 Yellow     Clarity, UA 10/10/2024 Clear     Specific Gravity, UA 10/10/2024 1.020     pH, UA 10/10/2024 6.5     Leukocytes, UA 10/10/2024 1+ (A)     Nitrite, UA 10/10/2024 Negative     Protein, UA 10/10/2024 Negative     Glucose, UA 10/10/2024 Negative     Ketones, UA 10/10/2024 Negative     Urobilinogen, UA 10/10/2024 0.2     Bilirubin, UA 10/10/2024 Negative     Occult Blood, UA 10/10/2024 Negative     EXT Preg Test, Ur 10/10/2024 Negative     Control 10/10/2024 Valid     WBC 10/10/2024 8.30     RBC 10/10/2024 4.21     Hemoglobin 10/10/2024 11.8     Hematocrit 10/10/2024 36.4     MCV 10/10/2024 87     MCH 10/10/2024 28.0     MCHC 10/10/2024 32.4     RDW 10/10/2024 13.6     MPV 10/10/2024 8.9     Platelets 10/10/2024 447 (H)     nRBC 10/10/2024 0     Segmented % 10/10/2024 47     Immature Grans % 10/10/2024 0     Lymphocytes % 10/10/2024 41     Monocytes % 10/10/2024 8     Eosinophils Relative 10/10/2024 4     Basophils Relative 10/10/2024 0     Absolute Neutrophils 10/10/2024 3.85     Absolute Immature Grans 10/10/2024 0.01     Absolute Lymphocytes 10/10/2024 3.42     Absolute Monocytes 10/10/2024 0.70     Eosinophils Absolute 10/10/2024 0.29     Basophils Absolute 10/10/2024 0.03     Sodium 10/10/2024 136     Potassium 10/10/2024 3.6     Chloride 10/10/2024 106     CO2 10/10/2024 20 (L)     ANION GAP 10/10/2024 10     BUN 10/10/2024 9     Creatinine 10/10/2024 0.53 (L)     Glucose 10/10/2024 103     Calcium 10/10/2024 8.9     AST 10/10/2024 10 (L)     ALT 10/10/2024 13      Alkaline Phosphatase 10/10/2024 56     Total Protein 10/10/2024 6.6     Albumin 10/10/2024 3.6     Total Bilirubin 10/10/2024 0.31     eGFR 10/10/2024 125     Magnesium 10/10/2024 1.9     Lipase 10/10/2024 34     RBC, UA 10/10/2024 1-2     WBC, UA 10/10/2024 10-20 (A)     Epithelial Cells 10/10/2024 Moderate (A)     Bacteria, UA 10/10/2024 Occasional     Urine Culture 10/10/2024 >100,000 cfu/ml Beta Hemolytic Streptococcus Group B (A)    Appointment on 09/27/2024   Component Date Value    CRP 09/27/2024 14.1 (H)    Admission on 07/07/2024, Discharged on 07/07/2024   Component Date Value    WBC 07/07/2024 7.49     RBC 07/07/2024 4.28     Hemoglobin 07/07/2024 12.3     Hematocrit 07/07/2024 37.7     MCV 07/07/2024 88     MCH 07/07/2024 28.7     MCHC 07/07/2024 32.6     RDW 07/07/2024 12.8     MPV 07/07/2024 8.9     Platelets 07/07/2024 450 (H)     nRBC 07/07/2024 0     Segmented % 07/07/2024 51     Immature Grans % 07/07/2024 0     Lymphocytes % 07/07/2024 38     Monocytes % 07/07/2024 8     Eosinophils Relative 07/07/2024 3     Basophils Relative 07/07/2024 0     Absolute Neutrophils 07/07/2024 3.83     Absolute Immature Grans 07/07/2024 0.01     Absolute Lymphocytes 07/07/2024 2.85     Absolute Monocytes 07/07/2024 0.58     Eosinophils Absolute 07/07/2024 0.19     Basophils Absolute 07/07/2024 0.03     Sodium 07/07/2024 136     Potassium 07/07/2024 3.7     Chloride 07/07/2024 104     CO2 07/07/2024 22     ANION GAP 07/07/2024 10     BUN 07/07/2024 5     Creatinine 07/07/2024 0.64     Glucose 07/07/2024 88     Calcium 07/07/2024 9.1     AST 07/07/2024 23     ALT 07/07/2024 18     Alkaline Phosphatase 07/07/2024 59     Total Protein 07/07/2024 7.1     Albumin 07/07/2024 3.7     Total Bilirubin 07/07/2024 0.46     eGFR 07/07/2024 117     Lipase 07/07/2024 16     EXT Preg Test, Ur 07/07/2024 Negative     Control 07/07/2024 Valid     Color, UA 07/07/2024 Yellow     Clarity, UA 07/07/2024 Clear     Specific Gravity, UA  07/07/2024 1.025     pH, UA 07/07/2024 6.5     Leukocytes, UA 07/07/2024 Negative     Nitrite, UA 07/07/2024 Negative     Protein, UA 07/07/2024 Negative     Glucose, UA 07/07/2024 Negative     Ketones, UA 07/07/2024 Trace (A)     Urobilinogen, UA 07/07/2024 0.2     Bilirubin, UA 07/07/2024 Negative     Occult Blood, UA 07/07/2024 Negative    Appointment on 05/29/2024   Component Date Value    RODRIGUEZ SYNDROME DNA FRANCISCO J* 05/29/2024 Not Detected     HEREDITARY BREAST & OVAR* 05/29/2024 Not Detected     FAMILIAL HYPERCHOLESTERO* 05/29/2024 Not Detected    Office Visit on 03/01/2024   Component Date Value    Color, UA 03/01/2024 Yellow     Clarity, UA 03/01/2024 Clear     Specific Gravity, UA 03/01/2024 1.020     pH, UA 03/01/2024 6.5     Leukocytes, UA 03/01/2024 Moderate (A)     Nitrite, UA 03/01/2024 Negative     Protein, UA 03/01/2024 Trace (A)     Glucose, UA 03/01/2024 Negative     Ketones, UA 03/01/2024 Negative     Urobilinogen, UA 03/01/2024 <2.0     Bilirubin, UA 03/01/2024 Negative     Occult Blood, UA 03/01/2024 Negative     Urine Culture 03/01/2024 10,000-19,000 cfu/ml     RBC, UA 03/01/2024 1-2     WBC, UA 03/01/2024 10-20 (A)     Epithelial Cells 03/01/2024 Moderate (A)     Bacteria, UA 03/01/2024 None Seen     MUCUS THREADS 03/01/2024 Innumerable (A)    Office Visit on 02/26/2024   Component Date Value    SARS-CoV-2 02/26/2024 Negative     INFLUENZA A PCR 02/26/2024 Negative     INFLUENZA B PCR 02/26/2024 Negative    Office Visit on 02/01/2024   Component Date Value     RAPID STREP A 02/01/2024 Negative     Throat Culture 02/01/2024 Negative for beta-hemolytic Streptococcus    Admission on 01/28/2024, Discharged on 01/28/2024   Component Date Value    SARS-CoV-2 01/28/2024 Positive (A)     INFLUENZA A PCR 01/28/2024 Negative     INFLUENZA B PCR 01/28/2024 Negative     RSV PCR 01/28/2024 Negative    There may be more visits with results that are not included.     Substance Abuse History:  Social History      Substance and Sexual Activity   Drug Use Yes    Frequency: 2.0 times per week    Types: Marijuana    Comment: medical marijuana (vape)       Family Psychiatric History:   Family History   Problem Relation Age of Onset    Rheum arthritis Mother     Psoriasis Mother     Other Mother     Hypertension Mother     Diabetes unspecified Mother     Sjogren's syndrome Mother     Alcohol abuse Mother     Drug abuse Mother     Anxiety disorder Father     Alcohol abuse Father     Lung cancer Maternal Grandfather     Cancer Other         bone    Diabetes Other     Other Other         High blood pressure    Depression Maternal Grandmother        The following portions of the patient's history were reviewed and updated as appropriate: allergies, current medications, past family history, past medical history, past social history, past surgical history and problem list.    Social History     Socioeconomic History    Marital status: Single     Spouse name: Not on file    Number of children: 0    Years of education: 12    Highest education level: Not on file   Occupational History    Occupation: Unemployed     Employer: [employer]   Tobacco Use    Smoking status: Never     Passive exposure: Past    Smokeless tobacco: Never   Vaping Use    Vaping status: Never Used   Substance and Sexual Activity    Alcohol use: Not Currently     Comment: rarely    Drug use: Yes     Frequency: 2.0 times per week     Types: Marijuana     Comment: medical marijuana (vape)    Sexual activity: Not Currently     Partners: Male     Comment: Nuva ring   Other Topics Concern    Not on file   Social History Narrative    Home:  Living with mom and MGM        Education:    Pt denies any h/o learning disability Dxs but admits to having great difficulty in math requiring a .  She reached childhood milestones on time as far as he knows.    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 On reflection, she feels it was moreso her anxiety as the cause of her leaving school, and she was using the other reason as an excuse so she would not have to admit to anxiety at that time.  She is now very open about her anxiety and does not mind that I have this information in the general social section of her chart.     Social Drivers of Health     Financial Resource Strain: Low Risk  (8/16/2022)    Overall Financial Resource Strain (CARDIA)     Difficulty of Paying Living Expenses: Not hard at all   Food Insecurity: No Food Insecurity (8/16/2022)    Hunger Vital Sign     Worried About Running Out of Food in the Last Year: Never true     Ran Out of Food in the Last Year: Never true   Transportation Needs: No Transportation Needs (8/16/2022)    PRAPARE - Transportation     Lack of Transportation (Medical): No     Lack of Transportation (Non-Medical): No   Physical Activity: Unknown (2/11/2021)    Exercise Vital Sign     Days of Exercise per Week: 0 days     Minutes of Exercise per Session: Not on file   Stress: Not on file   Social Connections: Not on file   Intimate Partner Violence: Not on file   Housing Stability: Not on file     Social History     Social History Narrative    Home:  Living with mom and MGM        Education:    Pt denies any h/o learning disability Dxs but admits to having great difficulty in math requiring a .  She reached childhood milestones on time as far as he knows.    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 On reflection, she feels it was moreso her anxiety as the cause of her leaving school, and she was using the other reason as an  excuse so she would not have to admit to anxiety at that time.  She is now very open about her anxiety and does not mind that I have this information in the general social section of her chart.       Objective:       Mental status:  Appearance calm and cooperative , adequate hygiene and grooming and good eye contact    Mood dysphoric   Affect affect was constricted   Speech a normal rate and fluent   Thought Processes coherent/organized and normal thought processes   Hallucinations no hallucinations present    Thought Content no delusions   Abnormal Thoughts no suicidal thoughts  and no homicidal thoughts    Orientation  oriented to person and place and time   Remote Memory short term memory intact and long term memory intact   Attention Span concentration intact   Intellect Appears to be of Average Intelligence   Insight Limited insight   Judgement judgment was limited   Muscle Strength n/a   Language no difficulty naming common objects and no difficulty repeating a phrase    Fund of Knowledge displays adequate knowledge of current events, adequate fund of knowledge regarding past history and adequate fund of knowledge regarding vocabulary                Assessment/Plan:       Diagnoses and all orders for this visit:    Depression, major, recurrent, moderate (HCC)    Generalized anxiety disorder    Panic disorder without agoraphobia        Assessment & Plan  Depression, major, recurrent, moderate (HCC)         Generalized anxiety disorder    Orders:    escitalopram (LEXAPRO) 20 mg tablet; Take 1 tablet (20 mg total) by mouth daily    buPROPion (WELLBUTRIN XL) 300 mg 24 hr tablet; Take 1 tablet (300 mg total) by mouth daily    ALPRAZolam (XANAX) 0.5 mg tablet; Take 1 tablet (0.5 mg total) by mouth 3 (three) times a day as needed for anxiety Do not start before December 11, 2024.    Panic disorder without agoraphobia         Chronic migraine without aura without status migrainosus, not intractable    Orders:     OLANZapine (ZyPREXA) 7.5 mg tablet; Take 1 tablet (7.5 mg total) by mouth daily at bedtime Do not take with reglan.                     Treatment Recommendations- Risks Benefits      Immediate Medical/Psychiatric/Psychotherapy Treatments and Any Precautions: as stated on HPI     Risks, Benefits And Possible Side Effects Of Medications:  {PSYCH RISK, BENEFITS AND POSSIBLE SIDE EFFECTS (Optional):31660    Controlled Medication Discussion: Discussed with patient Black Box warning on concurrent use of benzodiazepines and opioid medications including sedation, respiratory depression, coma and death. Patient understands the risk of treatment with benzodiazepines in addition to opioids and wants to continue taking those medications. , Discussed with patient the risks of sedation, respiratory depression, impairment of ability to drive and potential for abuse and addiction related to treatment with benzodiazepine medications. The patient understands risk of treatment with benzodiazepine medications, agrees to not drive if feels impaired and agrees to take medications as prescribed. and The patient has been filling controlled prescriptions on time as prescribed to [place] Prescription Drug Monitoring program.      Psychotherapy Provided:                Visit Time    Visit Start Time: 10:00  Visit Stop Time: 10:20  Total Visit Duration:  20 minutes

## 2024-11-13 NOTE — ASSESSMENT & PLAN NOTE
Orders:    OLANZapine (ZyPREXA) 7.5 mg tablet; Take 1 tablet (7.5 mg total) by mouth daily at bedtime Do not take with reglan.

## 2024-11-13 NOTE — ASSESSMENT & PLAN NOTE
Orders:    escitalopram (LEXAPRO) 20 mg tablet; Take 1 tablet (20 mg total) by mouth daily    buPROPion (WELLBUTRIN XL) 300 mg 24 hr tablet; Take 1 tablet (300 mg total) by mouth daily    ALPRAZolam (XANAX) 0.5 mg tablet; Take 1 tablet (0.5 mg total) by mouth 3 (three) times a day as needed for anxiety Do not start before December 11, 2024.

## 2024-11-14 ENCOUNTER — TELEPHONE (OUTPATIENT)
Dept: PSYCHIATRY | Facility: CLINIC | Age: 34
End: 2024-11-14

## 2024-11-14 NOTE — TELEPHONE ENCOUNTER
Called and left message for patient to return a call to [phone] and schedule 3 month follow up with provider (Aliyah Nunn). Please schedule upon return call. Thank you.

## 2024-11-19 ENCOUNTER — ANNUAL EXAM (OUTPATIENT)
Dept: OBGYN CLINIC | Facility: MEDICAL CENTER | Age: 34
End: 2024-11-19
Payer: COMMERCIAL

## 2024-11-19 ENCOUNTER — RESULTS FOLLOW-UP (OUTPATIENT)
Age: 34
End: 2024-11-19

## 2024-11-19 VITALS
SYSTOLIC BLOOD PRESSURE: 124 MMHG | WEIGHT: 240 LBS | HEIGHT: 63 IN | DIASTOLIC BLOOD PRESSURE: 78 MMHG | BODY MASS INDEX: 42.52 KG/M2

## 2024-11-19 DIAGNOSIS — Z30.44 ENCOUNTER FOR SURVEILLANCE OF VAGINAL RING HORMONAL CONTRACEPTIVE DEVICE: ICD-10-CM

## 2024-11-19 DIAGNOSIS — Z01.419 ENCOUNTER FOR WELL WOMAN EXAM WITH ROUTINE GYNECOLOGICAL EXAM: Primary | ICD-10-CM

## 2024-11-19 PROCEDURE — 99395 PREV VISIT EST AGE 18-39: CPT | Performed by: OBSTETRICS & GYNECOLOGY

## 2024-11-19 PROCEDURE — G0476 HPV COMBO ASSAY CA SCREEN: HCPCS | Performed by: OBSTETRICS & GYNECOLOGY

## 2024-11-19 PROCEDURE — G0145 SCR C/V CYTO,THINLAYER,RESCR: HCPCS | Performed by: OBSTETRICS & GYNECOLOGY

## 2024-11-19 RX ORDER — ETONOGESTREL AND ETHINYL ESTRADIOL VAGINAL RING .015; .12 MG/D; MG/D
RING VAGINAL
Qty: 3 EACH | Refills: 4 | Status: SHIPPED | OUTPATIENT
Start: 2024-11-19 | End: 2025-11-25

## 2024-11-19 NOTE — PROGRESS NOTES
ASSESSMENT & PLAN: Cruz Schneider is a 34 y.o.  with normal gynecologic exam.    1.  Routine well woman exam done today  2.  Pap and HPV:  The patient's last pap and hpv was .    It was normal.    Pap and cotesting was done today.    Current ASCCP Guidelines reviewed. - previous abnormal   3.  The following were reviewed in today's visit: breast self exam, family planning choices, exercise, and healthy diet.      CC:  Annual Gynecologic Examination    HPI: Cruz Schneider is a 34 y.o.  who presents for annual gynecologic examination.  She has the following concerns:  palpable lump close to clitoris      Health Maintenance:    She wears her seatbelt routinely.    She does perform regular monthly self breast exams.    She feels safe at home.     Past Medical History:   Diagnosis Date    Abnormal Pap smear of cervix     Allergic     Anxiety     Arthritis     Dental caries     Depression     Fibromyalgia, primary     GERD (gastroesophageal reflux disease)     Hypertension     IBS (irritable bowel syndrome)     Migraine     Obesity     Vitamin B12 deficiency        Past Surgical History:   Procedure Laterality Date    COLONOSCOPY N/A 2018    Procedure: COLONOSCOPY;  Surgeon: Stephanie Alston MD;  Location: [facility] GI LAB;  Service: Gastroenterology    IL EXC B9 LESION MRGN XCP SK TG S/N/H/F/G > 4.0CM N/A 2021    Procedure: EXCISION OF PILAR SCALP CYST X 2 AND RIGHT INNER GROIN NEVVUS;  Surgeon: Denis Barron MD;  Location:  MAIN OR;  Service: Plastics    IL REPAIR COMPLEX SCALP/ARM/LEG 2.6-7.5 CM N/A 2021    Procedure: CLOSURE WOUND SCALP X 2 AND RIGHT INNER GROIN;  Surgeon: Denis Barron MD;  Location:  MAIN OR;  Service: Plastics    TONSILLECTOMY      WISDOM TOOTH EXTRACTION         Past OB/Gyn History:  OB History          0    Para   0    Term   0       0    AB   0    Living   0         SAB   0    IAB   0    Ectopic   0    Multiple   0    Live Births   0                    Family History   Problem Relation Age of Onset    Rheum arthritis Mother     Psoriasis Mother     Other Mother     Hypertension Mother     Diabetes unspecified Mother     Sjogren's syndrome Mother     Alcohol abuse Mother     Drug abuse Mother     Anxiety disorder Father     Alcohol abuse Father     Lung cancer Maternal Grandfather     Cancer Other         bone    Diabetes Other     Other Other         High blood pressure    Depression Maternal Grandmother        Social History:  Social History     Socioeconomic History    Marital status: Single     Spouse name: Not on file    Number of children: 0    Years of education: 12    Highest education level: Not on file   Occupational History    Occupation: Unemployed     Employer: [employer]   Tobacco Use    Smoking status: Never     Passive exposure: Past    Smokeless tobacco: Never   Vaping Use    Vaping status: Never Used   Substance and Sexual Activity    Alcohol use: Not Currently     Comment: rarely    Drug use: Yes     Frequency: 2.0 times per week     Types: Marijuana     Comment: medical marijuana (vape)    Sexual activity: Not Currently     Partners: Male     Birth control/protection: Ring     Comment: Nuva ring   Other Topics Concern    Not on file   Social History Narrative    Home:  Living with mom and MGM        Education:    Pt denies any h/o learning disability Dxs but admits to having great difficulty in math requiring a .  She reached childhood milestones on time as far as he knows.    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 On reflection, she feels it was moreso her anxiety as the cause of her leaving school, and she was using the other reason as an excuse so she would not have to admit to anxiety at that time.  She is now  very open about her anxiety and does not mind that I have this information in the general social section of her chart.     Social Drivers of Health     Financial Resource Strain: Low Risk  (8/16/2022)    Overall Financial Resource Strain (CARDIA)     Difficulty of Paying Living Expenses: Not hard at all   Food Insecurity: No Food Insecurity (8/16/2022)    Hunger Vital Sign     Worried About Running Out of Food in the Last Year: Never true     Ran Out of Food in the Last Year: Never true   Transportation Needs: No Transportation Needs (8/16/2022)    PRAPARE - Transportation     Lack of Transportation (Medical): No     Lack of Transportation (Non-Medical): No   Physical Activity: Unknown (2/11/2021)    Exercise Vital Sign     Days of Exercise per Week: 0 days     Minutes of Exercise per Session: Not on file   Stress: Not on file   Social Connections: Not on file   Intimate Partner Violence: Not on file   Housing Stability: Not on file       Allergies   Allergen Reactions    Cimzia [Certolizumab Pegol] Swelling    Desvenlafaxine Other (See Comments)     Other reaction(s): state of confusion    Ixekizumab Vomiting    Seasonal Ic [Octacosanol] Nasal Congestion    Tizanidine Anxiety         Current Outpatient Medications:     [START ON 12/11/2024] ALPRAZolam (XANAX) 0.5 mg tablet, Take 1 tablet (0.5 mg total) by mouth 3 (three) times a day as needed for anxiety Do not start before December 11, 2024., Disp: 90 tablet, Rfl: 2    ascorbic acid (VITAMIN C) 500 MG tablet, Take 500 mg by mouth daily, Disp: , Rfl:     bisacodyl (DULCOLAX) 5 mg EC tablet, Take as directed by GI office, Disp: 4 tablet, Rfl: 0    Botox 200 units SOLR, , Disp: , Rfl:     buPROPion (WELLBUTRIN XL) 300 mg 24 hr tablet, Take 1 tablet (300 mg total) by mouth daily, Disp: 90 tablet, Rfl: 0    Cholecalciferol (Vitamin D3) 50 MCG (2000 UT) capsule, Take 1 capsule (2,000 Units total) by mouth daily, Disp: 90 capsule, Rfl: 1    cyanocobalamin 1,000 mcg/mL, 1  IM injection every month, Disp: 3 mL, Rfl: 3    dicyclomine (BENTYL) 10 mg capsule, Take 2 capsules (20 mg total) by mouth 3 (three) times a day before meals, Disp: 120 capsule, Rfl: 1    diphenoxylate-atropine (LOMOTIL) 2.5-0.025 mg per tablet, Take 1 tablet by mouth 4 (four) times a day as needed for diarrhea, Disp: 30 tablet, Rfl: 0    escitalopram (LEXAPRO) 20 mg tablet, Take 1 tablet (20 mg total) by mouth daily, Disp: 90 tablet, Rfl: 0    etonogestrel-ethinyl estradiol (NuvaRing) 0.12-0.015 MG/24HR vaginal ring, Insert ring for 21 days then remove for one week., Disp: 3 each, Rfl: 4    FeroSul 325 (65 Fe) MG tablet, Take 1 tablet (325 mg total) by mouth in the morning, Disp: 90 tablet, Rfl: 1    gabapentin (NEURONTIN) 300 mg capsule, Take 1 capsule (300 mg total) by mouth 3 (three) times a day, Disp: 90 capsule, Rfl: 5    Galcanezumab-gnlm (Emgality) 120 MG/ML SOAJ, One ml subcutaneous on the right thigh and 1 ml subcutaneous on the left thigh at the same time for 1 dose, Disp: 2 mL, Rfl: 0    indomethacin (INDOCIN) 25 mg capsule, 1-2 tabs BID prn severe headache with food. Hold toradol., Disp: 30 capsule, Rfl: 6    loperamide (IMODIUM) 2 mg capsule, Take 1 capsule (2 mg total) by mouth 4 (four) times a day as needed for diarrhea, Disp: 12 capsule, Rfl: 0    methocarbamol (ROBAXIN) 500 mg tablet, Take 1 tablet (500 mg total) by mouth 3 (three) times a day, Disp: 270 tablet, Rfl: 1    nystatin (MYCOSTATIN) cream, Apply topically 2 (two) times a day, Disp: 15 g, Rfl: 0    OLANZapine (ZyPREXA) 7.5 mg tablet, Take 1 tablet (7.5 mg total) by mouth daily at bedtime Do not take with reglan., Disp: 90 tablet, Rfl: 0    ondansetron (ZOFRAN) 4 mg tablet, Take 1 tablet (4 mg total) by mouth every 6 (six) hours, Disp: 30 tablet, Rfl: 2    pramipexole (MIRAPEX) 0.25 mg tablet, , Disp: , Rfl:     prazosin (MINIPRESS) 2 mg capsule, Take 1 capsule (2 mg total) by mouth daily at bedtime, Disp: 90 capsule, Rfl: 0    RABEprazole  "(ACIPHEX) 20 MG tablet, Take 1 tablet (20 mg total) by mouth 2 (two) times a day, Disp: 60 tablet, Rfl: 3    Trudhesa 0.725 MG/ACT AERS, 1 spray in each nostril for total dose of 1.45mg, may repeat 1 dose after 1 hour. Max 2 doses per 24 hours and 3 doses per week., Disp: 12 mL, Rfl: 2    Cyanocobalamin (Vitamin B-12) 500 MCG SUBL, Place 1 tablet (500 mcg total) under the tongue in the morning, Disp: 30 tablet, Rfl: 2    magnesium Oxide (MAG-OX) 400 mg TABS, Take 1 tablet (400 mg total) by mouth daily, Disp: 30 tablet, Rfl: 5    Current Facility-Administered Medications:     cyanocobalamin injection 1,000 mcg, 1,000 mcg, Intramuscular, Q30 Days, Oni Beckham DO, 1,000 mcg at 03/14/24 1450      Review of Systems  Constitutional :no fever, feels well, no tiredness, no recent weight gain or loss  ENT: no ear ache, no loss of hearing, no nosebleeds or nasal discharge, no sore throat or hoarseness.  Cardiovascular: no complaints of slow or fast heart beat, no chest pain, no palpitations, no leg claudication or lower extremity edema.  Respiratory: no complaints of shortness of shortness of breath, no MAURO  Breasts:no complaints of breast pain, breast lump, or nipple discharge  Gastrointestinal: no complaints of abdominal pain, constipation, nausea, vomiting, or diarrhea or bloody stools  Genitourinary : no complaints of dysuria, incontinence, pelvic pain, no dysmenorrhea, vaginal discharge or abnormal vaginal bleeding and as noted in HPI.  Musculoskeletal: no complaints of arthralgia, no myalgia, no joint swelling or stiffness, no limb pain or swelling.  Integumentary: no complaints of skin rash or lesion, itching or dry skin  Neurological: no complaints of headache, no confusion, no numbness or tingling, no dizziness or fainting    Objective      /78   Ht 5' 3\" (1.6 m)   Wt 109 kg (240 lb)   LMP 11/01/2023 (Approximate)   BMI 42.51 kg/m²   General:   appears stated age, cooperative, alert normal mood and affect "   Lungs: clear to auscultation bilaterally   Breasts: normal appearance, no masses or tenderness, Inspection negative, No nipple retraction or dimpling, No nipple discharge or bleeding, No axillary or supraclavicular adenopathy, Normal to palpation without dominant masses   Abdomen: soft, non-tender, without masses or organomegaly   Vulva: normal, normal female genitalia, Bartholin's, Urethra, [place] normal, no lesions, normal female hair distribution, no clitoral enlargement   Vagina: normal vagina, no discharge, exudate, lesion, or erythema   Urethra: normal   Cervix: Normal, no discharge. Nontender.   Uterus: normal size, contour, position, consistency, mobility, non-tender   Adnexa: no mass, fullness, tenderness   Psychiatric orientation to person, place, and time: normal. mood and affect: normal

## 2024-11-21 ENCOUNTER — OFFICE VISIT (OUTPATIENT)
Age: 34
End: 2024-11-21
Payer: COMMERCIAL

## 2024-11-21 VITALS
TEMPERATURE: 97.9 F | OXYGEN SATURATION: 98 % | DIASTOLIC BLOOD PRESSURE: 88 MMHG | BODY MASS INDEX: 42.81 KG/M2 | SYSTOLIC BLOOD PRESSURE: 128 MMHG | HEART RATE: 106 BPM | WEIGHT: 241.6 LBS | HEIGHT: 63 IN

## 2024-11-21 DIAGNOSIS — Z23 NEED FOR IMMUNIZATION AGAINST INFLUENZA: ICD-10-CM

## 2024-11-21 DIAGNOSIS — Z00.00 WELL ADULT EXAM: Primary | ICD-10-CM

## 2024-11-21 DIAGNOSIS — Z23 ENCOUNTER FOR IMMUNIZATION: ICD-10-CM

## 2024-11-21 PROCEDURE — 96372 THER/PROPH/DIAG INJ SC/IM: CPT

## 2024-11-21 PROCEDURE — 90471 IMMUNIZATION ADMIN: CPT | Performed by: FAMILY MEDICINE

## 2024-11-21 PROCEDURE — 99395 PREV VISIT EST AGE 18-39: CPT | Performed by: FAMILY MEDICINE

## 2024-11-21 PROCEDURE — 90673 RIV3 VACCINE NO PRESERV IM: CPT | Performed by: FAMILY MEDICINE

## 2024-11-21 RX ORDER — CYANOCOBALAMIN 1000 UG/ML
1000 INJECTION, SOLUTION INTRAMUSCULAR; SUBCUTANEOUS
Status: SHIPPED | OUTPATIENT
Start: 2024-11-21

## 2024-11-21 RX ADMIN — CYANOCOBALAMIN 1000 MCG: 1000 INJECTION, SOLUTION INTRAMUSCULAR; SUBCUTANEOUS at 17:04

## 2024-11-21 NOTE — PROGRESS NOTES
"Assessment/Plan: Wellness exam done at this time.  Vaccines reviewed and up-to-date.  Patient flu shot at this time.  Patient will have vitamin B12 injection at this time.  Laboratory studies reviewed and up-to-date.  No early family history of colorectal cancer.  No breast related issues at this time.  Patient up-to-date with gynecologist.  Patient will try to diet and exercise appropriately.  Placard done at this time.  Patient will be set up for removal of 2 lesions left face.       Diagnoses and all orders for this visit:    Well adult exam    Encounter for immunization    Need for immunization against influenza  -     Flublok (recombinant) 0.5 mL IM  -     cyanocobalamin injection 1,000 mcg            Subjective:        Patient ID: Cruz Schneider is a 34 y.o. female.      Patient is here for wellness exam.  Labs and vaccines reviewed.  Patient status post seeing gynecology and up-to-date in this regard.  No breast related issues at this time.  No early family history of colorectal cancer.          The following portions of the patient's history were reviewed and updated as appropriate: allergies, current medications, past family history, past medical history, past social history, past surgical history and problem list.      Review of Systems   Constitutional: Negative.    HENT: Negative.     Eyes: Negative.    Respiratory: Negative.     Cardiovascular: Negative.    Gastrointestinal: Negative.    Endocrine: Negative.    Genitourinary: Negative.    Musculoskeletal: Negative.    Skin:  Positive for color change.   Allergic/Immunologic: Negative.    Neurological: Negative.    Hematological: Negative.    Psychiatric/Behavioral: Negative.             Objective:               /88 (BP Location: Left arm, Patient Position: Sitting, Cuff Size: Large)   Pulse (!) 106   Temp 97.9 °F (36.6 °C) (Temporal)   Ht 5' 3\" (1.6 m)   Wt 110 kg (241 lb 9.6 oz)   LMP 11/01/2023 (Approximate)   SpO2 98%   BMI 42.80 " kg/m²          Physical Exam  Vitals and nursing note reviewed.   Constitutional:       General: She is not in acute distress.     Appearance: Normal appearance. She is not ill-appearing, toxic-appearing or diaphoretic.   HENT:      Head: Normocephalic and atraumatic.      Right Ear: Tympanic membrane, ear canal and external ear normal. There is no impacted cerumen.      Left Ear: Tympanic membrane, ear canal and external ear normal. There is no impacted cerumen.      Nose: Nose normal. No congestion or rhinorrhea.      Mouth/Throat:      Mouth: Mucous membranes are moist.      Pharynx: No oropharyngeal exudate or posterior oropharyngeal erythema.   Eyes:      General: No scleral icterus.        Right eye: No discharge.         Left eye: No discharge.   Neck:      Vascular: No carotid bruit.   Cardiovascular:      Rate and Rhythm: Normal rate and regular rhythm.      Pulses: Normal pulses.      Heart sounds: Normal heart sounds. No murmur heard.     No friction rub. No gallop.   Pulmonary:      Effort: Pulmonary effort is normal. No respiratory distress.      Breath sounds: Normal breath sounds. No stridor. No wheezing, rhonchi or rales.   Chest:      Chest wall: No tenderness.   Musculoskeletal:         General: No swelling, tenderness, deformity or signs of injury. Normal range of motion.      Cervical back: Normal range of motion and neck supple. No rigidity. No muscular tenderness.      Right lower leg: No edema.      Left lower leg: No edema.   Lymphadenopathy:      Cervical: No cervical adenopathy.   Skin:     General: Skin is warm and dry.      Capillary Refill: Capillary refill takes less than 2 seconds.      Coloration: Skin is not jaundiced.      Findings: Lesion present. No bruising, erythema or rash.      Comments: 2 small lesions left face lateral to left eye   Neurological:      Mental Status: She is alert and oriented to person, place, and time. Mental status is at baseline.      Cranial Nerves: No  cranial nerve deficit.      Sensory: No sensory deficit.      Motor: No weakness.      Coordination: Coordination normal.      Gait: Gait normal.   Psychiatric:         Mood and Affect: Mood normal.         Behavior: Behavior normal.         Thought Content: Thought content normal.         Judgment: Judgment normal.

## 2024-11-22 ENCOUNTER — TELEMEDICINE (OUTPATIENT)
Dept: BEHAVIORAL/MENTAL HEALTH CLINIC | Facility: CLINIC | Age: 34
End: 2024-11-22
Payer: COMMERCIAL

## 2024-11-22 DIAGNOSIS — F33.1 DEPRESSION, MAJOR, RECURRENT, MODERATE (HCC): Primary | Chronic | ICD-10-CM

## 2024-11-22 DIAGNOSIS — F41.1 GENERALIZED ANXIETY DISORDER: Chronic | ICD-10-CM

## 2024-11-22 PROCEDURE — 90837 PSYTX W PT 60 MINUTES: CPT | Performed by: SOCIAL WORKER

## 2024-11-22 NOTE — PSYCH
Virtual Regular Visit    Verification of patient location:    Patient is located at Home in the following state in which I hold an active license [place]    Assessment/Plan:    Problem List Items Addressed This Visit          Behavioral Health    Generalized anxiety disorder (Chronic)    Depression, major, recurrent, moderate (HCC) - Primary (Chronic)     Goals addressed in session: Goal 1      Reason for visit is   Chief Complaint   Patient presents with    Virtual Regular Visit     Encounter provider APARNA Fox    Recent Visits  Date Type Provider Dept   11/21/24 [facility] Visit [facility]   Showing recent visits within past 7 days and meeting all other requirements  Today's Visits  Date Type Provider Dept   11/22/24 Telemedicine APARNA [facility] Therapist [place]   Showing today's visits and meeting all other requirements  Future Appointments  No visits were found meeting these conditions.  Showing future appointments within next 150 days and meeting all other requirements       The patient was identified by name and date of birth. Cruz Schneider was informed that this is a telemedicine visit and that the visit is being conducted throughthe Epic Embedded platform. She agrees to proceed..  My office door was closed. No one else was in the room.  She acknowledged consent and understanding of privacy and security of the video platform. The patient has agreed to participate and understands they can discontinue the visit at any time.    Patient is aware this is a billable service.     Subjective  Cruz Schneider is a 34 y.o. female .      HPI     Past Medical History:   Diagnosis Date    Abnormal Pap smear of cervix     Allergic     Anxiety     Arthritis     Dental caries     Depression     Fibromyalgia, primary     GERD (gastroesophageal reflux disease)     Hypertension     IBS (irritable bowel syndrome)     Migraine     Obesity     Vitamin B12  deficiency        Past Surgical History:   Procedure Laterality Date    COLONOSCOPY N/A 5/8/2018    Procedure: COLONOSCOPY;  Surgeon: Stephanie Alston MD;  Location: [facility] GI LAB;  Service: Gastroenterology    OR EXC B9 LESION MRGN XCP SK TG S/N/H/F/G > 4.0CM N/A 7/1/2021    Procedure: EXCISION OF PILAR SCALP CYST X 2 AND RIGHT INNER GROIN NEVVUS;  Surgeon: Denis Barron MD;  Location:  MAIN OR;  Service: Plastics    OR REPAIR COMPLEX SCALP/ARM/LEG 2.6-7.5 CM N/A 7/1/2021    Procedure: CLOSURE WOUND SCALP X 2 AND RIGHT INNER GROIN;  Surgeon: Denis Barron MD;  Location:  MAIN OR;  Service: Plastics    TONSILLECTOMY      WISDOM TOOTH EXTRACTION         Current Outpatient Medications   Medication Sig Dispense Refill    [START ON 12/11/2024] ALPRAZolam (XANAX) 0.5 mg tablet Take 1 tablet (0.5 mg total) by mouth 3 (three) times a day as needed for anxiety Do not start before December 11, 2024. 90 tablet 2    ascorbic acid (VITAMIN C) 500 MG tablet Take 500 mg by mouth daily      bisacodyl (DULCOLAX) 5 mg EC tablet Take as directed by GI office 4 tablet 0    Botox 200 units SOLR       buPROPion (WELLBUTRIN XL) 300 mg 24 hr tablet Take 1 tablet (300 mg total) by mouth daily 90 tablet 0    Cholecalciferol (Vitamin D3) 50 MCG (2000 UT) capsule Take 1 capsule (2,000 Units total) by mouth daily 90 capsule 1    Cyanocobalamin (Vitamin B-12) 500 MCG SUBL Place 1 tablet (500 mcg total) under the tongue in the morning 30 tablet 2    cyanocobalamin 1,000 mcg/mL 1 IM injection every month 3 mL 3    dicyclomine (BENTYL) 10 mg capsule Take 2 capsules (20 mg total) by mouth 3 (three) times a day before meals 120 capsule 1    diphenoxylate-atropine (LOMOTIL) 2.5-0.025 mg per tablet Take 1 tablet by mouth 4 (four) times a day as needed for diarrhea 30 tablet 0    escitalopram (LEXAPRO) 20 mg tablet Take 1 tablet (20 mg total) by mouth daily 90 tablet 0    etonogestrel-ethinyl estradiol (NuvaRing) 0.12-0.015 MG/24HR vaginal ring  Insert ring for 21 days then remove for one week. 3 each 4    FeroSul 325 (65 Fe) MG tablet Take 1 tablet (325 mg total) by mouth in the morning 90 tablet 1    gabapentin (NEURONTIN) 300 mg capsule Take 1 capsule (300 mg total) by mouth 3 (three) times a day 90 capsule 5    Galcanezumab-gnlm (Emgality) 120 MG/ML SOAJ One ml subcutaneous on the right thigh and 1 ml subcutaneous on the left thigh at the same time for 1 dose 2 mL 0    indomethacin (INDOCIN) 25 mg capsule 1-2 tabs BID prn severe headache with food. Hold toradol. 30 capsule 6    loperamide (IMODIUM) 2 mg capsule Take 1 capsule (2 mg total) by mouth 4 (four) times a day as needed for diarrhea 12 capsule 0    magnesium Oxide (MAG-OX) 400 mg TABS Take 1 tablet (400 mg total) by mouth daily 30 tablet 5    methocarbamol (ROBAXIN) 500 mg tablet Take 1 tablet (500 mg total) by mouth 3 (three) times a day 270 tablet 1    nystatin (MYCOSTATIN) cream Apply topically 2 (two) times a day 15 g 0    OLANZapine (ZyPREXA) 7.5 mg tablet Take 1 tablet (7.5 mg total) by mouth daily at bedtime Do not take with reglan. 90 tablet 0    ondansetron (ZOFRAN) 4 mg tablet Take 1 tablet (4 mg total) by mouth every 6 (six) hours 30 tablet 2    pramipexole (MIRAPEX) 0.25 mg tablet       prazosin (MINIPRESS) 2 mg capsule Take 1 capsule (2 mg total) by mouth daily at bedtime 90 capsule 0    RABEprazole (ACIPHEX) 20 MG tablet Take 1 tablet (20 mg total) by mouth 2 (two) times a day 60 tablet 3    Trudhesa 0.725 MG/ACT AERS 1 spray in each nostril for total dose of 1.45mg, may repeat 1 dose after 1 hour. Max 2 doses per 24 hours and 3 doses per week. 12 mL 2     Current Facility-Administered Medications   Medication Dose Route Frequency Provider Last Rate Last Admin    cyanocobalamin injection 1,000 mcg  1,000 mcg Intramuscular Q30 Days Oni Beckham DO   1,000 mcg at 03/14/24 1450    cyanocobalamin injection 1,000 mcg  1,000 mcg Intramuscular Q30 Days    1,000 mcg at 11/21/24 0093         Allergies   Allergen Reactions    Cimzia [Certolizumab Pegol] Swelling    Desvenlafaxine Other (See Comments)     Other reaction(s): state of confusion    Ixekizumab Vomiting    Seasonal Ic [Octacosanol] Nasal Congestion    Tizanidine Anxiety       Review of Systems    Video Exam    There were no vitals filed for this visit.    Physical Exam     Behavioral Health Psychotherapy Progress Note    Psychotherapy Provided: Individual Psychotherapy     1. Depression, major, recurrent, moderate (HCC)        2. Generalized anxiety disorder            Goals addressed in session: Goal 1     DATA: Met with Stephanie for her scheduled individual session. 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 She states that she often feels lonely when she is staying at her friends' house. Stephanie states that she continues to work on setting limits/boundaries with her friend (Jacqueline). She states that she continues to be disappointed that her friend is not able to provide her with the level of support she needs. This clinician used mindfulness-based strategies to assist Stephanie with identifying the things over which she has some level of control (her responses) and things over which she has very little control (her friend's responsiveness). We discussed Stephanie's attempts to increase her social [place]. She states that she has spoken with the women whose dog she is watching, and they stated that they will invite her to some of their get-togethers. She states that she struggles to attend events, because of her physical health concerns. We discussed Stephanie's medical treatment. She states that she recently received medical documentation that disputes her previous diagnosis of ankylosing spondalitis. She states that she is very upset to have been diagnosed incorrectly. This clinician provided support and also pointed out that the treatments for autoimmune diseases are often very similar to one another. She acknowledged  "understanding.    During this session, this clinician used the following therapeutic modalities: Client-centered Therapy, Dialectical Behavior Therapy, Mindfulness-based Strategies, Motivational Interviewing, Solution-Focused Therapy, and Supportive Psychotherapy    Substance Abuse was not addressed during this session. If the client is diagnosed with a co-occurring substance use disorder, please indicate any changes in the frequency or amount of use: n/a. Stage of change for addressing substance use diagnoses: No substance use/Not applicable    ASSESSMENT:  Stephanie Schneider presents with a Euthymic/ normal mood.     her affect is Normal range and intensity, which is congruent, with her mood and the content of the session. The client has made progress on their goals.    Stephanie Schneider presents with a minimal risk of suicide, minimal risk of self-harm, and minimal risk of harm to others.    For any risk assessment that surpasses a \"low\" rating, a safety plan must be developed.    A safety plan was indicated: no  If yes, describe in detail n/a    PLAN: Between sessions, Stephanie Schneider will continue to work on developing her mindfulness practice. She will work on increasing her social supports. Stephanie will continue to set/maintain emotional boundaries with people who do not provide her with the support she needs. At the next session, the therapist will use Client-centered Therapy, Dialectical Behavior Therapy, Mindfulness-based Strategies, Motivational Interviewing, Solution-Focused Therapy, and Supportive Psychotherapy to address her mood regulation and relationship concerns.    Behavioral Health Treatment Plan and Discharge Planning: Stephanie Schneider is aware of and agrees to continue to work on their treatment plan. They have identified and are working toward their discharge goals. yes    Visit start and stop times:    11/22/24  Start Time: 0807  Stop Time: 0902  Total Visit Time: 55 minutes        "

## 2024-11-25 ENCOUNTER — RESULTS FOLLOW-UP (OUTPATIENT)
Dept: LABOR AND DELIVERY | Facility: HOSPITAL | Age: 34
End: 2024-11-25

## 2024-11-25 LAB
LAB AP GYN PRIMARY INTERPRETATION: NORMAL
Lab: NORMAL

## 2024-11-26 NOTE — PSYCH
Virtual Regular Visit    Verification of patient location:    Patient is located at Home in the following state in which I hold an active license [place]      Assessment/Plan:    Problem List Items Addressed This Visit          Behavioral Health    Generalized anxiety disorder (Chronic)    Depression, major, recurrent, moderate (HCC) - Primary (Chronic)       Goals addressed in session: Goal 1          Reason for visit is   Chief Complaint   Patient presents with    Virtual Regular Visit          Encounter provider APARNA Fox      Recent Visits  Date Type Provider Dept   11/22/24 Telemedicine [facility] Therapist [place]   11/21/24 Office Visit [facility]   Showing recent visits within past 7 days and meeting all other requirements  Future Appointments  No visits were found meeting these conditions.  Showing future appointments within next 150 days and meeting all other requirements       The patient was identified by name and date of birth. Cruz Schneider was informed that this is a telemedicine visit and that the visit is being conducted throughthe Epic Embedded platform. She agrees to proceed..  My office door was closed. No one else was in the room.  She acknowledged consent and understanding of privacy and security of the video platform. The patient has agreed to participate and understands they can discontinue the visit at any time.    Patient is aware this is a billable service.     Subjective  Cruz Schneider is a 34 y.o. female.      HPI     Past Medical History:   Diagnosis Date    Abnormal Pap smear of cervix     Allergic     Anxiety     Arthritis     Dental caries     Depression     Fibromyalgia, primary     GERD (gastroesophageal reflux disease)     Hypertension     IBS (irritable bowel syndrome)     Migraine     Obesity     Vitamin B12 deficiency        Past Surgical History:   Procedure Laterality Date    COLONOSCOPY N/A  5/8/2018    Procedure: COLONOSCOPY;  Surgeon: Stephanie Alston MD;  Location: [facility] GI LAB;  Service: Gastroenterology    DC EXC B9 LESION MRGN XCP SK TG S/N/H/F/G > 4.0CM N/A 7/1/2021    Procedure: EXCISION OF PILAR SCALP CYST X 2 AND RIGHT INNER GROIN NEVVUS;  Surgeon: Denis Barron MD;  Location:  MAIN OR;  Service: Plastics    DC REPAIR COMPLEX SCALP/ARM/LEG 2.6-7.5 CM N/A 7/1/2021    Procedure: CLOSURE WOUND SCALP X 2 AND RIGHT INNER GROIN;  Surgeon: Denis Barron MD;  Location:  MAIN OR;  Service: Plastics    TONSILLECTOMY      WISDOM TOOTH EXTRACTION         Current Outpatient Medications   Medication Sig Dispense Refill    [START ON 12/11/2024] ALPRAZolam (XANAX) 0.5 mg tablet Take 1 tablet (0.5 mg total) by mouth 3 (three) times a day as needed for anxiety Do not start before December 11, 2024. 90 tablet 2    ascorbic acid (VITAMIN C) 500 MG tablet Take 500 mg by mouth daily      bisacodyl (DULCOLAX) 5 mg EC tablet Take as directed by GI office 4 tablet 0    Botox 200 units SOLR       buPROPion (WELLBUTRIN XL) 300 mg 24 hr tablet Take 1 tablet (300 mg total) by mouth daily 90 tablet 0    Cholecalciferol (Vitamin D3) 50 MCG (2000 UT) capsule Take 1 capsule (2,000 Units total) by mouth daily 90 capsule 1    Cyanocobalamin (Vitamin B-12) 500 MCG SUBL Place 1 tablet (500 mcg total) under the tongue in the morning 30 tablet 2    cyanocobalamin 1,000 mcg/mL 1 IM injection every month 3 mL 3    dicyclomine (BENTYL) 10 mg capsule Take 2 capsules (20 mg total) by mouth 3 (three) times a day before meals 120 capsule 1    diphenoxylate-atropine (LOMOTIL) 2.5-0.025 mg per tablet Take 1 tablet by mouth 4 (four) times a day as needed for diarrhea 30 tablet 0    escitalopram (LEXAPRO) 20 mg tablet Take 1 tablet (20 mg total) by mouth daily 90 tablet 0    etonogestrel-ethinyl estradiol (NuvaRing) 0.12-0.015 MG/24HR vaginal ring Insert ring for 21 days then remove for one week. 3 each 4    FeroSul 325 (65 Fe) MG  tablet Take 1 tablet (325 mg total) by mouth in the morning 90 tablet 1    gabapentin (NEURONTIN) 300 mg capsule Take 1 capsule (300 mg total) by mouth 3 (three) times a day 90 capsule 5    Galcanezumab-gnlm (Emgality) 120 MG/ML SOAJ One ml subcutaneous on the right thigh and 1 ml subcutaneous on the left thigh at the same time for 1 dose 2 mL 0    indomethacin (INDOCIN) 25 mg capsule 1-2 tabs BID prn severe headache with food. Hold toradol. 30 capsule 6    loperamide (IMODIUM) 2 mg capsule Take 1 capsule (2 mg total) by mouth 4 (four) times a day as needed for diarrhea 12 capsule 0    magnesium Oxide (MAG-OX) 400 mg TABS Take 1 tablet (400 mg total) by mouth daily 30 tablet 5    methocarbamol (ROBAXIN) 500 mg tablet Take 1 tablet (500 mg total) by mouth 3 (three) times a day 270 tablet 1    nystatin (MYCOSTATIN) cream Apply topically 2 (two) times a day 15 g 0    OLANZapine (ZyPREXA) 7.5 mg tablet Take 1 tablet (7.5 mg total) by mouth daily at bedtime Do not take with reglan. 90 tablet 0    ondansetron (ZOFRAN) 4 mg tablet Take 1 tablet (4 mg total) by mouth every 6 (six) hours 30 tablet 2    pramipexole (MIRAPEX) 0.25 mg tablet       prazosin (MINIPRESS) 2 mg capsule Take 1 capsule (2 mg total) by mouth daily at bedtime 90 capsule 0    RABEprazole (ACIPHEX) 20 MG tablet Take 1 tablet (20 mg total) by mouth 2 (two) times a day 60 tablet 3    Trudhesa 0.725 MG/ACT AERS 1 spray in each nostril for total dose of 1.45mg, may repeat 1 dose after 1 hour. Max 2 doses per 24 hours and 3 doses per week. 12 mL 2     Current Facility-Administered Medications   Medication Dose Route Frequency Provider Last Rate Last Admin    cyanocobalamin injection 1,000 mcg  1,000 mcg Intramuscular Q30 Days Oni Beckham DO   1,000 mcg at 03/14/24 1450    cyanocobalamin injection 1,000 mcg  1,000 mcg Intramuscular Q30 Days    1,000 mcg at 11/21/24 1704        Allergies   Allergen Reactions    Cimzia [Certolizumab Pegol] Swelling     Desvenlafaxine Other (See Comments)     Other reaction(s): state of confusion    Ixekizumab Vomiting    Seasonal Ic [Octacosanol] Nasal Congestion    Tizanidine Anxiety     Review of Systems    Video Exam    There were no vitals filed for this visit.    Physical Exam     Behavioral Health Psychotherapy Progress Note    Psychotherapy Provided: Individual Psychotherapy     1. Depression, major, recurrent, moderate (HCC)        2. Generalized anxiety disorder            Goals addressed in session: Goal 1     DATA: Met with Stephanie for her scheduled individual session. Stephanie discussed her physical health issues, her issues re: grief/loss, and her friendships. Stephanie states that she has been working on practicing healthy lifestyle decisions. She states that she has not been able to engage in much exercise, but she is working on getting some crafting done. She states that she has been feeling the loss of her grandmother, but she also feels that her relationship with her mother is very close. She states that she continues to work on setting/maintaining appropriate limits/boundaries with her friend Jacqueline. She is also willing to work on building her other supports and finding others to rely on.   During this session, this clinician used the following therapeutic modalities: Client-centered Therapy, Dialectical Behavior Therapy, Mindfulness-based Strategies, Motivational Interviewing, Solution-Focused Therapy, and Supportive Psychotherapy    Substance Abuse was not addressed during this session. If the client is diagnosed with a co-occurring substance use disorder, please indicate any changes in the frequency or amount of use: n/a. Stage of change for addressing substance use diagnoses: No substance use/Not applicable    ASSESSMENT:  Stephanie Schneider presents with a slightly Dysthymic mood.     her affect is Normal range and intensity, which is congruent, with her mood and the content of the session. The client has made  "progress on their goals.    Stephanie Schneider presents with a minimal risk of suicide, minimal risk of self-harm, and minimal risk of harm to others.    For any risk assessment that surpasses a \"low\" rating, a safety plan must be developed.    A safety plan was indicated: no  If yes, describe in detail n/a    PLAN: Between sessions, Stephanie Schneider will continue to work on developing her social support network. She will continue to engage in healthy decision-making re: both physical and emotional health concerns. At the next session, the therapist will use Client-centered Therapy, Dialectical Behavior Therapy, Mindfulness-based Strategies, Motivational Interviewing, Solution-Focused Therapy, and Supportive Psychotherapy to address her mood regulation, physical health, and relationship concerns.    Behavioral Health Treatment Plan and Discharge Planning: Stephanie Schneider is aware of and agrees to continue to work on their treatment plan. They have identified and are working toward their discharge goals. yes    Visit start and stop times:    11/08/24  Start Time: 0801  Stop Time: 0855  Total Visit Time: 54 minutes            "

## 2024-12-04 ENCOUNTER — TELEPHONE (OUTPATIENT)
Age: 34
End: 2024-12-04

## 2024-12-04 ENCOUNTER — VBI (OUTPATIENT)
Dept: ADMINISTRATIVE | Facility: OTHER | Age: 34
End: 2024-12-04

## 2024-12-04 NOTE — TELEPHONE ENCOUNTER
12/04/24 7:07 AM     Chart reviewed for Hemoglobin A1c ; nothing is submitted to the patient's insurance at this time.     Cheng Cuadra MA   [facility]

## 2024-12-04 NOTE — TELEPHONE ENCOUNTER
Patient contacted the office to schedule a follow up visit with provider. Patient is now scheduled for 2/10/25  at 10:30am virtually.

## 2024-12-06 ENCOUNTER — TELEMEDICINE (OUTPATIENT)
Dept: BEHAVIORAL/MENTAL HEALTH CLINIC | Facility: CLINIC | Age: 34
End: 2024-12-06
Payer: COMMERCIAL

## 2024-12-06 DIAGNOSIS — F41.1 GENERALIZED ANXIETY DISORDER: Chronic | ICD-10-CM

## 2024-12-06 DIAGNOSIS — F33.1 DEPRESSION, MAJOR, RECURRENT, MODERATE (HCC): Primary | Chronic | ICD-10-CM

## 2024-12-06 PROCEDURE — 90837 PSYTX W PT 60 MINUTES: CPT | Performed by: SOCIAL WORKER

## 2024-12-06 NOTE — PSYCH
Virtual Regular Visit    Verification of patient location:    Patient is located at Home in the following state in which I hold an active license [place]    Assessment/Plan:    Problem List Items Addressed This Visit          Behavioral Health    Generalized anxiety disorder (Chronic)    Depression, major, recurrent, moderate (HCC) - Primary (Chronic)     Goals addressed in session: Goal 1      Reason for visit is   Chief Complaint   Patient presents with    Virtual Regular Visit     Encounter provider APARNA Fox    Recent Visits  No visits were found meeting these conditions.  Showing recent visits within past 7 days and meeting all other requirements  Today's Visits  Date Type Provider Dept   12/06/24 Telemedicine APARNA Fox [facility] Therapist [place]   Showing today's visits and meeting all other requirements  Future Appointments  No visits were found meeting these conditions.  Showing future appointments within next 150 days and meeting all other requirements       The patient was identified by name and date of birth. Cruz Schneider was informed that this is a telemedicine visit and that the visit is being conducted throughthe Epic Embedded platform. She agrees to proceed..  My office door was closed. No one else was in the room.  She acknowledged consent and understanding of privacy and security of the video platform. The patient has agreed to participate and understands they can discontinue the visit at any time.    Patient is aware this is a billable service.     Subjective  Cruz Schneider is a 34 y.o. female.    HPI     Past Medical History:   Diagnosis Date    Abnormal Pap smear of cervix     Allergic     Anxiety     Arthritis     Dental caries     Depression     Fibromyalgia, primary     GERD (gastroesophageal reflux disease)     Hypertension     IBS (irritable bowel syndrome)     Migraine     Obesity     Vitamin B12 deficiency        Past Surgical History:   Procedure  Laterality Date    COLONOSCOPY N/A 5/8/2018    Procedure: COLONOSCOPY;  Surgeon: Stephanie Alston MD;  Location: [facility] GI LAB;  Service: Gastroenterology    NY EXC B9 LESION MRGN XCP SK TG S/N/H/F/G > 4.0CM N/A 7/1/2021    Procedure: EXCISION OF PILAR SCALP CYST X 2 AND RIGHT INNER GROIN NEVVUS;  Surgeon: Denis Barron MD;  Location:  MAIN OR;  Service: Plastics    NY REPAIR COMPLEX SCALP/ARM/LEG 2.6-7.5 CM N/A 7/1/2021    Procedure: CLOSURE WOUND SCALP X 2 AND RIGHT INNER GROIN;  Surgeon: Denis Barron MD;  Location:  MAIN OR;  Service: Plastics    TONSILLECTOMY      WISDOM TOOTH EXTRACTION         Current Outpatient Medications   Medication Sig Dispense Refill    [START ON 12/11/2024] ALPRAZolam (XANAX) 0.5 mg tablet Take 1 tablet (0.5 mg total) by mouth 3 (three) times a day as needed for anxiety Do not start before December 11, 2024. 90 tablet 2    ascorbic acid (VITAMIN C) 500 MG tablet Take 500 mg by mouth daily      bisacodyl (DULCOLAX) 5 mg EC tablet Take as directed by GI office 4 tablet 0    Botox 200 units SOLR       buPROPion (WELLBUTRIN XL) 300 mg 24 hr tablet Take 1 tablet (300 mg total) by mouth daily 90 tablet 0    Cholecalciferol (Vitamin D3) 50 MCG (2000 UT) capsule Take 1 capsule (2,000 Units total) by mouth daily 90 capsule 1    Cyanocobalamin (Vitamin B-12) 500 MCG SUBL Place 1 tablet (500 mcg total) under the tongue in the morning 30 tablet 2    cyanocobalamin 1,000 mcg/mL 1 IM injection every month 3 mL 3    dicyclomine (BENTYL) 10 mg capsule Take 2 capsules (20 mg total) by mouth 3 (three) times a day before meals 120 capsule 1    diphenoxylate-atropine (LOMOTIL) 2.5-0.025 mg per tablet Take 1 tablet by mouth 4 (four) times a day as needed for diarrhea 30 tablet 0    escitalopram (LEXAPRO) 20 mg tablet Take 1 tablet (20 mg total) by mouth daily 90 tablet 0    etonogestrel-ethinyl estradiol (NuvaRing) 0.12-0.015 MG/24HR vaginal ring Insert ring for 21 days then remove for one week. 3  each 4    FeroSul 325 (65 Fe) MG tablet Take 1 tablet (325 mg total) by mouth in the morning 90 tablet 1    gabapentin (NEURONTIN) 300 mg capsule Take 1 capsule (300 mg total) by mouth 3 (three) times a day 90 capsule 5    Galcanezumab-gnlm (Emgality) 120 MG/ML SOAJ One ml subcutaneous on the right thigh and 1 ml subcutaneous on the left thigh at the same time for 1 dose 2 mL 0    indomethacin (INDOCIN) 25 mg capsule 1-2 tabs BID prn severe headache with food. Hold toradol. 30 capsule 6    loperamide (IMODIUM) 2 mg capsule Take 1 capsule (2 mg total) by mouth 4 (four) times a day as needed for diarrhea 12 capsule 0    magnesium Oxide (MAG-OX) 400 mg TABS Take 1 tablet (400 mg total) by mouth daily 30 tablet 5    methocarbamol (ROBAXIN) 500 mg tablet Take 1 tablet (500 mg total) by mouth 3 (three) times a day 270 tablet 1    nystatin (MYCOSTATIN) cream Apply topically 2 (two) times a day 15 g 0    OLANZapine (ZyPREXA) 7.5 mg tablet Take 1 tablet (7.5 mg total) by mouth daily at bedtime Do not take with reglan. 90 tablet 0    ondansetron (ZOFRAN) 4 mg tablet Take 1 tablet (4 mg total) by mouth every 6 (six) hours 30 tablet 2    pramipexole (MIRAPEX) 0.25 mg tablet       prazosin (MINIPRESS) 2 mg capsule Take 1 capsule (2 mg total) by mouth daily at bedtime 90 capsule 0    RABEprazole (ACIPHEX) 20 MG tablet Take 1 tablet (20 mg total) by mouth 2 (two) times a day 60 tablet 3    Trudhesa 0.725 MG/ACT AERS 1 spray in each nostril for total dose of 1.45mg, may repeat 1 dose after 1 hour. Max 2 doses per 24 hours and 3 doses per week. 12 mL 2     Current Facility-Administered Medications   Medication Dose Route Frequency Provider Last Rate Last Admin    cyanocobalamin injection 1,000 mcg  1,000 mcg Intramuscular Q30 Days Oni Beckham DO   1,000 mcg at 03/14/24 1450    cyanocobalamin injection 1,000 mcg  1,000 mcg Intramuscular Q30 Days    1,000 mcg at 11/21/24 1704        Allergies   Allergen Reactions    Cimzia  "[Certolizumab Pegol] Swelling    Desvenlafaxine Other (See Comments)     Other reaction(s): state of confusion    Ixekizumab Vomiting    Seasonal Ic [Octacosanol] Nasal Congestion    Tizanidine Anxiety       Review of Systems    Video Exam    There were no vitals filed for this visit.    Physical Exam     Behavioral Health Psychotherapy Progress Note    Psychotherapy Provided: Individual Psychotherapy     1. Depression, major, recurrent, moderate (HCC)        2. Generalized anxiety disorder            Goals addressed in session: Goal 1     DATA: Met with Stephanie for her scheduled individual session. Stephanie discussed a recent revelation that she had about her reluctance to have a different dinner food than her mother. She states that she realized that this was a message that she received from one of the people that she used to date. We discussed the use of mindfulness combined with CBT skills. She also discussed her tendency to feel physical sensations of hunger but ignore them, because \"my brain is not hungry.\" This clinician encouraged her to mindfully tune into her body's messages. We discussed the importance of using \"food as medicine\"-- to use healthy food to impact her overall health and mood. Stephanie talked about her mother's upcoming surgery, as well as her upcoming surgery. She discussed her anxiety about these procedures. In addition, she talked about the holidays and how difficult it is to be without her grandmother during this season. Stephanie discussed a recent conversation with Jacqueline, who just told her that she is pregnant. Stephanie discussed her anxiety about Jacqueline's children, who are already going through a lost of stress due to the separation of their parents. We discussed Stephanie's social supports and ways that she can increase the number of people she can reach out to. She states that she has difficulty meeting people. She is going to some craft shows tomorrow, and she agreed to see if she can find any " "groups or inexpensive classes that she might be able to join. We tentatively scheduled our next visit as an in person visit, depending on how her mother is doing post surgery.      During this session, this clinician used the following therapeutic modalities: Client-centered Therapy, Dialectical Behavior Therapy, Mindfulness-based Strategies, Motivational Interviewing, Solution-Focused Therapy, and Supportive Psychotherapy    Substance Abuse was not addressed during this session. If the client is diagnosed with a co-occurring substance use disorder, please indicate any changes in the frequency or amount of use: n/a. Stage of change for addressing substance use diagnoses: No substance use/Not applicable    ASSESSMENT:  Stephanie Schneider presents with a Euthymic/ normal mood.     her affect is Normal range and intensity, which is congruent, with her mood and the content of the session. The client has made progress on their goals.    Stephanie Schneider presents with a minimal risk of suicide, minimal risk of self-harm, and minimal risk of harm to others.    For any risk assessment that surpasses a \"low\" rating, a safety plan must be developed.    A safety plan was indicated: no  If yes, describe in detail  n/a    PLAN: Between sessions, Stephanie Schneider will continue to monitor her mood. She will look for ways to increase her social support network. At the next session, the therapist will use Client-centered Therapy, Dialectical Behavior Therapy, Mindfulness-based Strategies, Motivational Interviewing, Solution-Focused Therapy, and Supportive Psychotherapy to address her mood regulation and relationship concerns.    Behavioral Health Treatment Plan and Discharge Planning: Stephanie Schneider is aware of and agrees to continue to work on their treatment plan. They have identified and are working toward their discharge goals. yes    Visit start and stop times:    12/06/24  Start Time: 0813  Stop Time: 0906  Total Visit Time: 53 " minutes

## 2024-12-10 ENCOUNTER — OFFICE VISIT (OUTPATIENT)
Age: 34
End: 2024-12-10

## 2024-12-10 VITALS
HEIGHT: 63 IN | BODY MASS INDEX: 42.52 KG/M2 | DIASTOLIC BLOOD PRESSURE: 78 MMHG | TEMPERATURE: 98.9 F | WEIGHT: 240 LBS | OXYGEN SATURATION: 97 % | HEART RATE: 95 BPM | SYSTOLIC BLOOD PRESSURE: 140 MMHG

## 2024-12-10 DIAGNOSIS — M79.7 FIBROMYALGIA SYNDROME: Primary | ICD-10-CM

## 2024-12-10 DIAGNOSIS — M45.0 ANKYLOSING SPONDYLITIS OF MULTIPLE SITES IN SPINE (HCC): ICD-10-CM

## 2024-12-10 NOTE — PROGRESS NOTES
Ambulatory Visit  Name: Cruz Schneider      : 1990      MRN: [id number]  Encounter Provider: Cate Malave DO  Encounter Date: 12/10/2024   Encounter department: [facility]    Assessment & Plan  Fibromyalgia syndrome  Patient is a 34-year-old female presenting with diffuse arthralgias and myalgias.  Pain is worse with physical activity.  Patient has tenderness to palpation of multiple large muscle groups.  Patient also endorses fatigue, brain fog, word finding difficulties, anxiety.  Discussed with patient that symptoms seem most consistent with fibromyalgia at this point.  Will try conservative measures with aqua therapy.  Will see if rib pain improves after cholecystectomy.  -Plan to resume aqua therapy after patient's surgery  - continue gabapentin per PCP        Ankylosing spondylitis of multiple sites in spine (HCC)  Patient was previously diagnosed with ankylosing spondylitis due to low back pain.  Patient was on multiple biologic therapy without any benefit.  Patient was unresponsive to NSAIDs.  Denies having morning stiffness, however reports waking up at night due to pain.  Patient underwent an MRI of the pelvis without any signs of sacroiliitis.  Currently low clinical suspicion for inflammatory back pain.  Less likely ankylosing spondylitis and will monitor off of therapy for now.  No dactylitis or enthesitis on exam.       RTC in 4 months     History of Present Illness     Cruz Schneider is a 34 y.o. female with a history of fibromyalgia, anxiety, depression, B12 deficiency, GERD, IBS, migraines, hypertension who presents for follow-up of spondyloarthropathy.    History obtained from : patient    Since last visit, symptoms have been stable.  Patient reports that she continues to experience pain in her bilateral rib area.  Sometimes feels like she cannot take a deep breath in, however denies shortness of breath.  Patient has an upcoming cholecystectomy scheduled  at the end of the month.  Denies prolonged morning stiffness or joint swelling.  No dactylitis or enthesitis.    Permanent history:  Per chart review, patient has been following with [facility] rheumatology and diagnosed with inflammatory spondyloarthropathy around 2017.  Patient has been on multiple medications in the past that had to be discontinued due to side effects including hydroxychloroquine (loose bowel movements), sulfasalazine (headaches), Humira, Enbrel, Cimzia (face, ankle and finger swelling), Taltz (swelling, nausea), Xeljanz (headaches and nausea) and Rinvoq (abdominal pain and nausea) which all had to be discontinued due to side effects.  Patient then started on Remicade 5/24/2023 however started feeling unwell (flushing, flu-like symptoms) on that medication as well and it was discontinued.  Patient gets multiple flareups that respond to prednisone tapers.  Has tried Imuran in the past which did not help.  - 10/7/24: established care with me  Patient is a 34-year-old female presenting to establish car for ankylosing spondylitis.  Patient  was diagnosed around 2017 when she developed ankle pain and concerns for inflammatory back pain.  Patient has been on multiple biologic therapy in the past and has been unable to tolerated due to side effects.  Has not noticed a significant difference in symptoms while on any therapy.  Has been unresponsive to NSAIDs.  Patient reports worsening joint symptoms with physical activity.  Does endorse nighttime awakening due to back pain, however no morning stiffness.  Denies dactylitis.  Currently majority of patient's symptoms are diffuse and worse with use which I suspect are likely due to fibromyalgia. MRI of the SI was unremarkable and patient was monitored off of therapy. Recommended restarting Aqua therapy for FMS    Family hx:  PsO and PsA in mother and grandmother     Review of Systems    Denies:  Fever  Rash  Oral/nasal/genital ulcers  Dry eyes, dry  mouth  Vision changes  Dysphagia/odynophagia  CP  MAURO  SOB at rest  Hematochezia  Gross hematuria  Muscle weakness   Dactylitis  Raynaud's  Joint issues other than noted above    Past Medical History:   Diagnosis Date    Abnormal Pap smear of cervix     Allergic     Anxiety     Arthritis     Dental caries     Depression     Fibromyalgia, primary     GERD (gastroesophageal reflux disease)     Hypertension     IBS (irritable bowel syndrome)     Migraine     Obesity     Vitamin B12 deficiency        Current Outpatient Medications on File Prior to Visit   Medication Sig Dispense Refill    [START ON 12/11/2024] ALPRAZolam (XANAX) 0.5 mg tablet Take 1 tablet (0.5 mg total) by mouth 3 (three) times a day as needed for anxiety Do not start before December 11, 2024. 90 tablet 2    ascorbic acid (VITAMIN C) 500 MG tablet Take 500 mg by mouth daily      bisacodyl (DULCOLAX) 5 mg EC tablet Take as directed by GI office 4 tablet 0    Botox 200 units SOLR       buPROPion (WELLBUTRIN XL) 300 mg 24 hr tablet Take 1 tablet (300 mg total) by mouth daily 90 tablet 0    Cholecalciferol (Vitamin D3) 50 MCG (2000 UT) capsule Take 1 capsule (2,000 Units total) by mouth daily 90 capsule 1    Cyanocobalamin (Vitamin B-12) 500 MCG SUBL Place 1 tablet (500 mcg total) under the tongue in the morning 30 tablet 2    cyanocobalamin 1,000 mcg/mL 1 IM injection every month 3 mL 3    dicyclomine (BENTYL) 10 mg capsule Take 2 capsules (20 mg total) by mouth 3 (three) times a day before meals 120 capsule 1    diphenoxylate-atropine (LOMOTIL) 2.5-0.025 mg per tablet Take 1 tablet by mouth 4 (four) times a day as needed for diarrhea 30 tablet 0    escitalopram (LEXAPRO) 20 mg tablet Take 1 tablet (20 mg total) by mouth daily 90 tablet 0    etonogestrel-ethinyl estradiol (NuvaRing) 0.12-0.015 MG/24HR vaginal ring Insert ring for 21 days then remove for one week. 3 each 4    FeroSul 325 (65 Fe) MG tablet Take 1 tablet (325 mg total) by mouth in the  morning 90 tablet 1    gabapentin (NEURONTIN) 300 mg capsule Take 1 capsule (300 mg total) by mouth 3 (three) times a day 90 capsule 5    Galcanezumab-gnlm (Emgality) 120 MG/ML SOAJ One ml subcutaneous on the right thigh and 1 ml subcutaneous on the left thigh at the same time for 1 dose 2 mL 0    indomethacin (INDOCIN) 25 mg capsule 1-2 tabs BID prn severe headache with food. Hold toradol. 30 capsule 6    loperamide (IMODIUM) 2 mg capsule Take 1 capsule (2 mg total) by mouth 4 (four) times a day as needed for diarrhea 12 capsule 0    magnesium Oxide (MAG-OX) 400 mg TABS Take 1 tablet (400 mg total) by mouth daily 30 tablet 5    methocarbamol (ROBAXIN) 500 mg tablet Take 1 tablet (500 mg total) by mouth 3 (three) times a day 270 tablet 1    nystatin (MYCOSTATIN) cream Apply topically 2 (two) times a day 15 g 0    OLANZapine (ZyPREXA) 7.5 mg tablet Take 1 tablet (7.5 mg total) by mouth daily at bedtime Do not take with reglan. 90 tablet 0    ondansetron (ZOFRAN) 4 mg tablet Take 1 tablet (4 mg total) by mouth every 6 (six) hours 30 tablet 2    pramipexole (MIRAPEX) 0.25 mg tablet       prazosin (MINIPRESS) 2 mg capsule Take 1 capsule (2 mg total) by mouth daily at bedtime 90 capsule 0    RABEprazole (ACIPHEX) 20 MG tablet Take 1 tablet (20 mg total) by mouth 2 (two) times a day 60 tablet 3    Trudhesa 0.725 MG/ACT AERS 1 spray in each nostril for total dose of 1.45mg, may repeat 1 dose after 1 hour. Max 2 doses per 24 hours and 3 doses per week. 12 mL 2     Current Facility-Administered Medications on File Prior to Visit   Medication Dose Route Frequency Provider Last Rate Last Admin    cyanocobalamin injection 1,000 mcg  1,000 mcg Intramuscular Q30 Days Oni Beckham DO   1,000 mcg at 03/14/24 1450    cyanocobalamin injection 1,000 mcg  1,000 mcg Intramuscular Q30 Days    1,000 mcg at 11/21/24 1704      Social History     Tobacco Use    Smoking status: Never     Passive exposure: Past    Smokeless tobacco: Never  "  Vaping Use    Vaping status: Never Used   Substance and Sexual Activity    Alcohol use: Not Currently     Comment: rarely    Drug use: Yes     Frequency: 2.0 times per week     Types: Marijuana     Comment: medical marijuana (vape)    Sexual activity: Not Currently     Partners: Male     Birth control/protection: Ring     Comment: Nuva ring       Objective     /78 (BP Location: Right arm, Patient Position: Sitting, Cuff Size: Adult)   Pulse 95   Temp 98.9 °F (37.2 °C) (Tympanic)   Ht 5' 3\" (1.6 m)   Wt 109 kg (240 lb)   SpO2 97%   BMI 42.51 kg/m²     Physical Exam   General appearance: normal appearing, no acute distress  Skin: normal, no rashes  HEENT: normal, moist oropharynx, no nasal or oral ulcers  Lymph nodes: no palpable adenopathy  Lungs: normal respiratory effort, comfortable on room air, lungs clear to auscultation b/l   Heart: normal heart sounds, normal rate, normal rhythm,  Abdomen: soft, normal bowel sounds, no tenderness  Neurologic: no obvious neurological deficits   Extremities: no edema, warm and well perfused     Musculoskeletal Exam:   - Observation: no obvious joint abnormalities    - Palpation: Diffusely tender to palpation in the thoracic paraspinal region and forearms   - Synovitis: absent  - Joint effusions: absent  - ROM: intact throughout  - Muscle Strength: 5/5 throughout       I have independently reviewed the following labs/images and interpret them as follows.  RF negative   CCP negative   BERNADINE negative     XR hip 9/25/21  There is no acute fracture or dislocation.  No significant hip degenerative changes.  No lytic or blastic osseous lesion.  Soft tissues are unremarkable.  The visualized lumbar spine is unremarkable.    MRI pelvis 10/25/24  No acute MR findings. No MR evidence for sacroiliitis.     Cate Malave DO, CCD, [facility]  [facility]     "

## 2024-12-10 NOTE — ASSESSMENT & PLAN NOTE
Patient was previously diagnosed with ankylosing spondylitis due to low back pain.  Patient was on multiple biologic therapy without any benefit.  Patient was unresponsive to NSAIDs.  Denies having morning stiffness, however reports waking up at night due to pain.  Patient underwent an MRI of the pelvis without any signs of sacroiliitis.  Currently low clinical suspicion for inflammatory back pain.  Less likely ankylosing spondylitis and will monitor off of therapy for now.  No dactylitis or enthesitis on exam.       RTC in 4 months

## 2024-12-10 NOTE — ASSESSMENT & PLAN NOTE
Patient is a 34-year-old female presenting with diffuse arthralgias and myalgias.  Pain is worse with physical activity.  Patient has tenderness to palpation of multiple large muscle groups.  Patient also endorses fatigue, brain fog, word finding difficulties, anxiety.  Discussed with patient that symptoms seem most consistent with fibromyalgia at this point.  Will try conservative measures with aqua therapy.  Will see if rib pain improves after cholecystectomy.  -Plan to resume aqua therapy after patient's surgery  - continue gabapentin per PCP

## 2024-12-11 ENCOUNTER — ANESTHESIA EVENT (OUTPATIENT)
Dept: PERIOP | Facility: HOSPITAL | Age: 34
End: 2024-12-11
Payer: COMMERCIAL

## 2024-12-12 RX ORDER — POLYETHYLENE GLYCOL 3350 17 G/17G
17 POWDER, FOR SOLUTION ORAL AS NEEDED
COMMUNITY

## 2024-12-12 RX ORDER — ACETAMINOPHEN 500 MG
500 TABLET ORAL EVERY 6 HOURS PRN
COMMUNITY

## 2024-12-12 NOTE — PRE-PROCEDURE INSTRUCTIONS
Pre-Surgery Instructions:   Medication Instructions    acetaminophen (TYLENOL) 500 mg tablet Uses PRN- OK to take day of surgery    ALPRAZolam (XANAX) 0.5 mg tablet Uses PRN- OK to take day of surgery    ascorbic acid (VITAMIN C) 500 MG tablet Hold day of surgery.    Botox 200 units SOLR Every 3 months    buPROPion (WELLBUTRIN XL) 300 mg 24 hr tablet Take night before surgery    Cholecalciferol (Vitamin D3) 50 MCG (2000 UT) capsule Hold day of surgery.    cyanocobalamin 1,000 mcg/mL Monthly injection    diphenoxylate-atropine (LOMOTIL) 2.5-0.025 mg per tablet Uses PRN- OK to take day of surgery    escitalopram (LEXAPRO) 20 mg tablet Take night before surgery    etonogestrel-ethinyl estradiol (NuvaRing) 0.12-0.015 MG/24HR vaginal ring implant    FeroSul 325 (65 Fe) MG tablet Hold day of surgery.    gabapentin (NEURONTIN) 300 mg capsule Take day of surgery.    Galcanezumab-gnlm (Emgality) 120 MG/ML SOAJ Monthly injection    indomethacin (INDOCIN) 25 mg capsule Stop taking 7 days prior to surgery.    magnesium Oxide (MAG-OX) 400 mg TABS Hold day of surgery.    methocarbamol (ROBAXIN) 500 mg tablet Uses PRN- DO NOT take day of surgery    nystatin (MYCOSTATIN) cream Uses PRN- DO NOT take day of surgery    OLANZapine (ZyPREXA) 7.5 mg tablet Take night before surgery    ondansetron (ZOFRAN) 4 mg tablet Uses PRN- OK to take day of surgery    polyethylene glycol (MIRALAX) 17 g packet Uses PRN- DO NOT take day of surgery    pramipexole (MIRAPEX) 0.25 mg tablet Take night before surgery    prazosin (MINIPRESS) 2 mg capsule Take night before surgery    RABEprazole (ACIPHEX) 20 MG tablet Take day of surgery.    Trudhesa 0.725 MG/ACT AERS Uses PRN- OK to take day of surgery   Medication instructions for day surgery reviewed. Please use only a sip of water to take your instructed medications. Avoid all over the counter vitamins, supplements and NSAIDS for one week prior to surgery per anesthesia guidelines. Tylenol is ok to take  as needed.     You will receive a call one business day prior to surgery with an arrival time and hospital directions. If your surgery is scheduled on a Monday, the hospital will be calling you on the Friday prior to your surgery. If you have not heard from anyone by 8pm, please call the hospital supervisor through the hospital  at [phone]. ([place] [phone] or [place] [phone]).    Do not eat or drink anything after midnight the night before your surgery, including candy, mints, lifesavers, or chewing gum. Do not drink alcohol 24hrs before your surgery. Try not to smoke at least 24hrs before your surgery.       Follow the pre surgery showering instructions as listed in the “My Surgical Experience Booklet” or otherwise provided by your surgeon's office. Do not use a blade to shave the surgical area 1 week before surgery. It is okay to use a clean electric clippers up to 24 hours before surgery. Do not apply any lotions, creams, including makeup, cologne, deodorant, or perfumes after showering on the day of your surgery. Do not use dry shampoo, hair spray, hair gel, or any type of hair products.     No contact lenses, eye make-up, or artificial eyelashes. Remove nail polish, including gel polish, and any artificial, gel, or acrylic nails if possible. Remove all jewelry including rings and body piercing jewelry.     Wear causal clothing that is easy to take on and off. Consider your type of surgery.    Keep any valuables, jewelry, piercings at home. Please bring any specially ordered equipment (sling, braces) if indicated.    Arrange for a responsible person to drive you to and from the hospital on the day of your surgery. Please confirm the visitor policy for the day of your procedure when you receive your phone call with an arrival time.     Call the surgeon's office with any new illnesses, exposures, or additional questions prior to surgery.    Please reference your “My Surgical Experience  Booklet” for additional information to prepare for your upcoming surgery.

## 2024-12-16 ENCOUNTER — TELEPHONE (OUTPATIENT)
Dept: OBGYN CLINIC | Facility: MEDICAL CENTER | Age: 34
End: 2024-12-16

## 2024-12-16 DIAGNOSIS — G43.709 CHRONIC MIGRAINE WITHOUT AURA WITHOUT STATUS MIGRAINOSUS, NOT INTRACTABLE: ICD-10-CM

## 2024-12-16 RX ORDER — GALCANEZUMAB 120 MG/ML
INJECTION, SOLUTION SUBCUTANEOUS
Qty: 1 ML | Refills: 11 | Status: SHIPPED | OUTPATIENT
Start: 2024-12-16

## 2024-12-16 NOTE — TELEPHONE ENCOUNTER
Patient called in as her speciality Pharmacy refusing to  send her Emgality , as they had it cancelled on their end. After reviewing order, it seems her Psychiatrist cancelled med on 11/13/24. Patient informed she is due in 4 days on 12/20/24    Patient aware I will send a refill request to provider. Pt does not need any sent to a local pharmacy, as she is requesting it to just be sent to her Speciality pharmacy as it does not take long for her to receive it once sent.    Routed to provider to advise to see pended med and send if agreeable. Patient refusing a need for a call back, as once pharmacy gets order, they will call her to schedule delivery date.

## 2024-12-16 NOTE — TELEPHONE ENCOUNTER
[facility] requesting refill for nuvaring. Pharmacy made aware medication was refilled on 11/19/24 for with 3 refills . Pharmacy found script.

## 2024-12-17 ENCOUNTER — SOCIAL WORK (OUTPATIENT)
Dept: BEHAVIORAL/MENTAL HEALTH CLINIC | Facility: CLINIC | Age: 34
End: 2024-12-17
Payer: COMMERCIAL

## 2024-12-17 DIAGNOSIS — F33.1 DEPRESSION, MAJOR, RECURRENT, MODERATE (HCC): Primary | Chronic | ICD-10-CM

## 2024-12-17 DIAGNOSIS — F41.1 GENERALIZED ANXIETY DISORDER: Chronic | ICD-10-CM

## 2024-12-17 PROCEDURE — 90837 PSYTX W PT 60 MINUTES: CPT | Performed by: SOCIAL WORKER

## 2024-12-17 NOTE — PSYCH
Behavioral Health Psychotherapy Progress Note    Psychotherapy Provided: Individual Psychotherapy     1. Depression, major, recurrent, moderate (HCC)        2. Generalized anxiety disorder            Goals addressed in session: Goal 1     DATA: Met with Stephanie for an in-person appointment. Stephanie states that she is due to have her gallbladder surgery next week. She states that she is nervous but also hopeful that she will feel better after the surgery. She states that she was feeling ill all last week, due to gallbladder pain. Stephanie talked about her family relationships. Stephanie states that she joined a Facebook group to make friends. She has connected with someone from [place] who recently lost her grandfather, so Stephanie states that she feels that they can connect. Stephanie states that her mother's surgery went smoothly and her recovery has been going well. Stephanie states that she and her mother will be spending time tonight with her uncle's girlfriend. Stephanie states that she likes her uncle's girlfriend, and she feels that this is a positive relationship in her life. Stephanie talked about her grief process related to the loss of her grandmother. This is the first [place] without her grandmother. Stephanie discussed the plans that she and her mother are making to help them manage their emotions over the holidays.     During this session, this clinician used the following therapeutic modalities: Client-centered Therapy, Dialectical Behavior Therapy, Mindfulness-based Strategies, Motivational Interviewing, Solution-Focused Therapy, and Supportive Psychotherapy    Substance Abuse was not addressed during this session. If the client is diagnosed with a co-occurring substance use disorder, please indicate any changes in the frequency or amount of use: n/a. Stage of change for addressing substance use diagnoses: No substance use/Not applicable    ASSESSMENT:  Stephanie Schneider presents with a Euthymic/ normal mood.     her  "affect is Normal range and intensity, which is congruent, with her mood and the content of the session. The client has made progress on their goals.    Stephanie Schneider presents with a minimal risk of suicide, minimal risk of self-harm, and minimal risk of harm to others.    For any risk assessment that surpasses a \"low\" rating, a safety plan must be developed.    A safety plan was indicated: no  If yes, describe in detail n/a    PLAN: Between sessions, Stephanie Schneider will continue to practice mindfulness. She will work on increasing her connections with positive supports (both on line and IRL). At the next session, the therapist will use Client-centered Therapy, Cognitive Behavioral Therapy, Dialectical Behavior Therapy, Mindfulness-based Strategies, Motivational Interviewing, Solution-Focused Therapy, and Supportive Psychotherapy to address her mood regulation, physical health concerns, and relationship issues.    Behavioral Health Treatment Plan and Discharge Planning: Stephanie Schneider is aware of and agrees to continue to work on their treatment plan. They have identified and are working toward their discharge goals. yes    Depression Follow-up Plan Completed: Yes. Stephanie will continue with individual therapy and medication management.     Visit start and stop times:    12/17/24  Start Time: 1103  Stop Time: 1200  Total Visit Time: 57 minutes  "

## 2024-12-18 ENCOUNTER — TELEPHONE (OUTPATIENT)
Age: 34
End: 2024-12-18

## 2024-12-18 NOTE — TELEPHONE ENCOUNTER
"Inbound call received from Patient to follow up on her Emgality refill script Patient states another provider somehow cancelled the prescription and she had to send a new script Monday. Per chart it was confirmed a new script was sent to [facility] on 12/16/2024 but Patient has not heard from the pharmacy. Per chart, E-script receipt confirmed by pharmacy (12/16/2024 11:11 AM EST.) Patient states she needs her dose for 12/20/2024.     Per chart \"Emgality has been approved by insurance through 11/14/24.\"    Outbound call made to [facility] and representative Leif confirmed prior authorization is needed.     Outbound call made to Patient and she was made aware. Patient states she is okay but would like to know if there is anything else she can do in the meantime since she is due for the medication 12/20/2024.     Team please start prior authorization as soon as possible. Thank you!    Jacki Perez PA-C: Please advise if possible, thank you!                   "

## 2024-12-18 NOTE — TELEPHONE ENCOUNTER
"Neris Cesar RN     12/18/24  1:35 PM  Inbound call received from Patient to follow up on her Emgality refill script Patient states another provider somehow cancelled the prescription and she had to send a new script Monday. Per chart it was confirmed a new script was sent to [facility] on 12/16/2024 but Patient has not heard from the pharmacy. Per chart, E-script receipt confirmed by pharmacy (12/16/2024 11:11 AM EST.) Patient states she needs her dose for 12/20/2024.      Per chart \"Emgality has been approved by insurance through 11/14/24.\"     Outbound call made to [facility] and representative Leif confirmed prior authorization is needed.      Outbound call made to Patient and she was made aware. Patient states she is okay but would like to know if there is anything else she can do in the meantime since she is due for the medication 12/20/2024.      Team please start prior authorization as soon as possible. Thank you!     Jacki Perez PA-C: Please advise if possible, thank you!                                   "

## 2024-12-18 NOTE — TELEPHONE ENCOUNTER
Could consider indocin daily with food until getting emgality.  Only if needed.    Do we suspect that emgality will be covered and in quick time frame?

## 2024-12-19 NOTE — TELEPHONE ENCOUNTER
Emgality approved through 12/18/25    Approval letter faxed to [facility] at [phone]     Called and advised pt of all of the below and above. She verbalized understanding

## 2024-12-20 NOTE — TELEPHONE ENCOUNTER
Received approval letter for patient's Emgality Auto Injector. Approval letter scanned in media encounter dated 12/18/24.    Emgality 120mg/ml approved from 12/18/24-12/18/25  (All strengths approved)    [employer] message sent to patient

## 2024-12-21 PROBLEM — Z00.00 WELL ADULT EXAM: Status: RESOLVED | Noted: 2024-11-21 | Resolved: 2024-12-21

## 2024-12-23 NOTE — TELEPHONE ENCOUNTER
Send office note to [employer]   Patient stated that [facility] was going to resubmit paperwork to them Patient requests all Lab, Cardiology, and Radiology Results on their Discharge Instructions

## 2024-12-26 ENCOUNTER — APPOINTMENT (OUTPATIENT)
Dept: RADIOLOGY | Facility: HOSPITAL | Age: 34
End: 2024-12-26
Payer: COMMERCIAL

## 2024-12-26 ENCOUNTER — ANESTHESIA (OUTPATIENT)
Dept: PERIOP | Facility: HOSPITAL | Age: 34
End: 2024-12-26
Payer: COMMERCIAL

## 2024-12-26 ENCOUNTER — HOSPITAL ENCOUNTER (OUTPATIENT)
Facility: HOSPITAL | Age: 34
Setting detail: OUTPATIENT SURGERY
Discharge: HOME | End: 2024-12-26
Attending: SURGERY | Admitting: SURGERY
Payer: COMMERCIAL

## 2024-12-26 VITALS
SYSTOLIC BLOOD PRESSURE: 157 MMHG | HEIGHT: 63 IN | OXYGEN SATURATION: 92 % | TEMPERATURE: 97.8 F | WEIGHT: 240 LBS | HEART RATE: 79 BPM | RESPIRATION RATE: 18 BRPM | DIASTOLIC BLOOD PRESSURE: 96 MMHG | BODY MASS INDEX: 42.52 KG/M2

## 2024-12-26 DIAGNOSIS — K82.8 BILIARY DYSKINESIA: ICD-10-CM

## 2024-12-26 LAB
EXT PREGNANCY TEST URINE: NEGATIVE
EXT. CONTROL: NORMAL

## 2024-12-26 PROCEDURE — 81025 URINE PREGNANCY TEST: CPT | Performed by: SURGERY

## 2024-12-26 PROCEDURE — NC001 PR NO CHARGE: Performed by: SURGERY

## 2024-12-26 PROCEDURE — 88304 TISSUE EXAM BY PATHOLOGIST: CPT | Performed by: PATHOLOGY

## 2024-12-26 PROCEDURE — 74300 X-RAY BILE DUCTS/PANCREAS: CPT

## 2024-12-26 PROCEDURE — 47562 LAPAROSCOPIC CHOLECYSTECTOMY: CPT | Performed by: PHYSICIAN ASSISTANT

## 2024-12-26 PROCEDURE — 47562 LAPAROSCOPIC CHOLECYSTECTOMY: CPT | Performed by: SURGERY

## 2024-12-26 RX ORDER — SODIUM CHLORIDE 9 MG/ML
75 INJECTION, SOLUTION INTRAVENOUS CONTINUOUS
Status: DISCONTINUED | OUTPATIENT
Start: 2024-12-26 | End: 2024-12-26 | Stop reason: HOSPADM

## 2024-12-26 RX ORDER — SODIUM CHLORIDE, SODIUM LACTATE, POTASSIUM CHLORIDE, CALCIUM CHLORIDE 600; 310; 30; 20 MG/100ML; MG/100ML; MG/100ML; MG/100ML
INJECTION, SOLUTION INTRAVENOUS CONTINUOUS PRN
Status: DISCONTINUED | OUTPATIENT
Start: 2024-12-26 | End: 2024-12-26

## 2024-12-26 RX ORDER — MIDAZOLAM HYDROCHLORIDE 2 MG/2ML
INJECTION, SOLUTION INTRAMUSCULAR; INTRAVENOUS AS NEEDED
Status: DISCONTINUED | OUTPATIENT
Start: 2024-12-26 | End: 2024-12-26

## 2024-12-26 RX ORDER — PROPOFOL 10 MG/ML
INJECTION, EMULSION INTRAVENOUS AS NEEDED
Status: DISCONTINUED | OUTPATIENT
Start: 2024-12-26 | End: 2024-12-26

## 2024-12-26 RX ORDER — DEXAMETHASONE SODIUM PHOSPHATE 10 MG/ML
INJECTION, SOLUTION INTRAMUSCULAR; INTRAVENOUS AS NEEDED
Status: DISCONTINUED | OUTPATIENT
Start: 2024-12-26 | End: 2024-12-26

## 2024-12-26 RX ORDER — HYDROMORPHONE HCL/PF 1 MG/ML
0.5 SYRINGE (ML) INJECTION
Status: COMPLETED | OUTPATIENT
Start: 2024-12-26 | End: 2024-12-26

## 2024-12-26 RX ORDER — LIDOCAINE HYDROCHLORIDE 20 MG/ML
INJECTION, SOLUTION EPIDURAL; INFILTRATION; INTRACAUDAL; PERINEURAL AS NEEDED
Status: DISCONTINUED | OUTPATIENT
Start: 2024-12-26 | End: 2024-12-26

## 2024-12-26 RX ORDER — BUPIVACAINE HYDROCHLORIDE 5 MG/ML
INJECTION, SOLUTION EPIDURAL; INTRACAUDAL AS NEEDED
Status: DISCONTINUED | OUTPATIENT
Start: 2024-12-26 | End: 2024-12-26 | Stop reason: HOSPADM

## 2024-12-26 RX ORDER — GLYCOPYRROLATE 0.2 MG/ML
INJECTION INTRAMUSCULAR; INTRAVENOUS AS NEEDED
Status: DISCONTINUED | OUTPATIENT
Start: 2024-12-26 | End: 2024-12-26

## 2024-12-26 RX ORDER — CEFAZOLIN SODIUM 2 G/50ML
2000 SOLUTION INTRAVENOUS ONCE
Status: COMPLETED | OUTPATIENT
Start: 2024-12-26 | End: 2024-12-26

## 2024-12-26 RX ORDER — SODIUM CHLORIDE, SODIUM LACTATE, POTASSIUM CHLORIDE, CALCIUM CHLORIDE 600; 310; 30; 20 MG/100ML; MG/100ML; MG/100ML; MG/100ML
100 INJECTION, SOLUTION INTRAVENOUS CONTINUOUS
Status: DISCONTINUED | OUTPATIENT
Start: 2024-12-26 | End: 2024-12-26

## 2024-12-26 RX ORDER — SODIUM CHLORIDE 9 MG/ML
125 INJECTION, SOLUTION INTRAVENOUS CONTINUOUS
Status: DISCONTINUED | OUTPATIENT
Start: 2024-12-26 | End: 2024-12-26

## 2024-12-26 RX ORDER — ONDANSETRON 2 MG/ML
INJECTION INTRAMUSCULAR; INTRAVENOUS AS NEEDED
Status: DISCONTINUED | OUTPATIENT
Start: 2024-12-26 | End: 2024-12-26

## 2024-12-26 RX ORDER — OXYCODONE HYDROCHLORIDE 5 MG/1
5 TABLET ORAL EVERY 6 HOURS PRN
Refills: 0 | Status: DISCONTINUED | OUTPATIENT
Start: 2024-12-26 | End: 2024-12-26 | Stop reason: HOSPADM

## 2024-12-26 RX ORDER — ALBUTEROL SULFATE 0.83 MG/ML
2.5 SOLUTION RESPIRATORY (INHALATION) ONCE AS NEEDED
Status: DISCONTINUED | OUTPATIENT
Start: 2024-12-26 | End: 2024-12-26 | Stop reason: HOSPADM

## 2024-12-26 RX ORDER — ROCURONIUM BROMIDE 10 MG/ML
INJECTION, SOLUTION INTRAVENOUS AS NEEDED
Status: DISCONTINUED | OUTPATIENT
Start: 2024-12-26 | End: 2024-12-26

## 2024-12-26 RX ORDER — OXYCODONE HYDROCHLORIDE 5 MG/1
7.5 TABLET ORAL EVERY 6 HOURS PRN
Refills: 0 | Status: DISCONTINUED | OUTPATIENT
Start: 2024-12-26 | End: 2024-12-26 | Stop reason: HOSPADM

## 2024-12-26 RX ORDER — ACETAMINOPHEN 325 MG/1
650 TABLET ORAL EVERY 6 HOURS PRN
Status: DISCONTINUED | OUTPATIENT
Start: 2024-12-26 | End: 2024-12-26 | Stop reason: HOSPADM

## 2024-12-26 RX ORDER — DIPHENHYDRAMINE HYDROCHLORIDE 50 MG/ML
12.5 INJECTION INTRAMUSCULAR; INTRAVENOUS ONCE AS NEEDED
Status: DISCONTINUED | OUTPATIENT
Start: 2024-12-26 | End: 2024-12-26 | Stop reason: HOSPADM

## 2024-12-26 RX ORDER — ONDANSETRON 2 MG/ML
4 INJECTION INTRAMUSCULAR; INTRAVENOUS EVERY 6 HOURS PRN
Status: DISCONTINUED | OUTPATIENT
Start: 2024-12-26 | End: 2024-12-26 | Stop reason: HOSPADM

## 2024-12-26 RX ORDER — OXYCODONE HYDROCHLORIDE 5 MG/1
5 TABLET ORAL EVERY 6 HOURS PRN
Qty: 15 TABLET | Refills: 0 | Status: SHIPPED | OUTPATIENT
Start: 2024-12-26 | End: 2025-01-05

## 2024-12-26 RX ORDER — FENTANYL CITRATE/PF 50 MCG/ML
50 SYRINGE (ML) INJECTION
Status: COMPLETED | OUTPATIENT
Start: 2024-12-26 | End: 2024-12-26

## 2024-12-26 RX ORDER — HYDROMORPHONE HCL IN WATER/PF 6 MG/30 ML
0.2 PATIENT CONTROLLED ANALGESIA SYRINGE INTRAVENOUS
Status: DISCONTINUED | OUTPATIENT
Start: 2024-12-26 | End: 2024-12-26 | Stop reason: HOSPADM

## 2024-12-26 RX ORDER — PROMETHAZINE HYDROCHLORIDE 25 MG/ML
12.5 INJECTION, SOLUTION INTRAMUSCULAR; INTRAVENOUS ONCE AS NEEDED
Status: DISCONTINUED | OUTPATIENT
Start: 2024-12-26 | End: 2024-12-26 | Stop reason: HOSPADM

## 2024-12-26 RX ORDER — FENTANYL CITRATE 50 UG/ML
INJECTION, SOLUTION INTRAMUSCULAR; INTRAVENOUS AS NEEDED
Status: DISCONTINUED | OUTPATIENT
Start: 2024-12-26 | End: 2024-12-26

## 2024-12-26 RX ADMIN — ROCURONIUM BROMIDE 50 MG: 10 INJECTION, SOLUTION INTRAVENOUS at 07:37

## 2024-12-26 RX ADMIN — FENTANYL CITRATE 25 MCG: 50 INJECTION INTRAMUSCULAR; INTRAVENOUS at 08:45

## 2024-12-26 RX ADMIN — SODIUM CHLORIDE, SODIUM LACTATE, POTASSIUM CHLORIDE, AND CALCIUM CHLORIDE: .6; .31; .03; .02 INJECTION, SOLUTION INTRAVENOUS at 08:30

## 2024-12-26 RX ADMIN — FENTANYL CITRATE 25 MCG: 50 INJECTION INTRAMUSCULAR; INTRAVENOUS at 08:50

## 2024-12-26 RX ADMIN — SODIUM CHLORIDE 125 ML/HR: 0.9 INJECTION, SOLUTION INTRAVENOUS at 07:10

## 2024-12-26 RX ADMIN — DEXAMETHASONE SODIUM PHOSPHATE 10 MG: 10 INJECTION, SOLUTION INTRAMUSCULAR; INTRAVENOUS at 07:39

## 2024-12-26 RX ADMIN — CEFAZOLIN SODIUM 2000 MG: 2 SOLUTION INTRAVENOUS at 07:29

## 2024-12-26 RX ADMIN — PROPOFOL 60 MCG/KG/MIN: 10 INJECTION, EMULSION INTRAVENOUS at 07:39

## 2024-12-26 RX ADMIN — OXYCODONE HYDROCHLORIDE 7.5 MG: 5 TABLET ORAL at 09:34

## 2024-12-26 RX ADMIN — HYDROMORPHONE HYDROCHLORIDE 0.5 MG: 1 INJECTION, SOLUTION INTRAMUSCULAR; INTRAVENOUS; SUBCUTANEOUS at 09:12

## 2024-12-26 RX ADMIN — FENTANYL CITRATE 50 MCG: 50 INJECTION INTRAMUSCULAR; INTRAVENOUS at 07:49

## 2024-12-26 RX ADMIN — ACETAMINOPHEN 650 MG: 325 TABLET ORAL at 09:34

## 2024-12-26 RX ADMIN — LIDOCAINE HYDROCHLORIDE 100 MG: 20 INJECTION, SOLUTION EPIDURAL; INFILTRATION; INTRACAUDAL; PERINEURAL at 07:35

## 2024-12-26 RX ADMIN — PROPOFOL 50 MG: 10 INJECTION, EMULSION INTRAVENOUS at 07:36

## 2024-12-26 RX ADMIN — FENTANYL CITRATE 50 MCG: 50 INJECTION INTRAMUSCULAR; INTRAVENOUS at 07:29

## 2024-12-26 RX ADMIN — MIDAZOLAM 2 MG: 1 INJECTION INTRAMUSCULAR; INTRAVENOUS at 07:29

## 2024-12-26 RX ADMIN — ONDANSETRON 4 MG: 2 INJECTION INTRAMUSCULAR; INTRAVENOUS at 08:16

## 2024-12-26 RX ADMIN — PROPOFOL 250 MG: 10 INJECTION, EMULSION INTRAVENOUS at 07:35

## 2024-12-26 RX ADMIN — GLYCOPYRROLATE 0.2 MG: 0.2 INJECTION, SOLUTION INTRAMUSCULAR; INTRAVENOUS at 07:55

## 2024-12-26 RX ADMIN — SUGAMMADEX 200 MG: 100 INJECTION, SOLUTION INTRAVENOUS at 08:24

## 2024-12-26 RX ADMIN — HYDROMORPHONE HYDROCHLORIDE 0.5 MG: 1 INJECTION, SOLUTION INTRAMUSCULAR; INTRAVENOUS; SUBCUTANEOUS at 08:58

## 2024-12-26 NOTE — ANESTHESIA POSTPROCEDURE EVALUATION
Post-Op Assessment Note    CV Status:  Stable    Pain management: satisfactory to patient       Mental Status:  Sleepy and arousable   Hydration Status:  Euvolemic   PONV Controlled:  Controlled   Airway Patency:  Patent     Post Op Vitals Reviewed: Yes    No anethesia notable event occurred.    Staff: Anesthesiologist, CRNA           Last Filed PACU Vitals:  Vitals Value Taken Time   Temp     Pulse 99 12/26/24 0832   /92 12/26/24 0833   Resp     SpO2 97 % 12/26/24 0832   Vitals shown include unfiled device data.    Modified Jasper:  No data recorded

## 2024-12-26 NOTE — DISCHARGE INSTR - AVS FIRST PAGE
[facility]    Discharge Instructions  Light activity for 2 weeks.  No heavy lifting for 2 weeks.  Max 10 lbs for 2 weeks.  No driving for 5-7 days or until pain is well controlled.  Surgical glue will fall off with time.  You may shower starting tomorrow.  Take discharge medications as prescribed.  Notify our office for nausea, vomiting, fever, diarrhea, chest pain, trouble breathing.  Follow up in our office in 2 weeks or sooner if needed.  Call with additional questions or concerns [phone].    For pain you may take ibuprofen 600 mg every 6 hours scheduled for 3 days then as needed (with food).  You can also take acetaminophen 650 mg every 6 hours scheduled for 3 days then as needed.  For ongoing pain you can alternate ibuprofen and acetaminophen every 3 hours.  If pain not controlled with this regimen you can use Oxycodone as prescribed.  Ice packs may be helpful, 20 min on, 20 min off alternating and monitoring skin.

## 2024-12-26 NOTE — ANESTHESIA PREPROCEDURE EVALUATION
Procedure:  CHOLECYSTECTOMY LAPAROSCOPIC (Abdomen)    Relevant Problems   CARDIO   (+) Anterior chest wall pain   (+) Basilar migraine   (+) Chronic migraine without aura without status migrainosus, not intractable   (+) Hyperlipidemia   (+) Hypertension   (+) Intractable chronic migraine without aura and with status migrainosus   (+) Intractable migraine with aura without status migrainosus   (+) Migraine headache   (+) Migraine with aura and with status migrainosus      GI/HEPATIC   (+) Biliary dyskinesia   (+) Gastroesophageal reflux disease without esophagitis   (+) Hyperinsulinemia      MUSCULOSKELETAL   (+) Acute bilateral thoracic back pain   (+) Ankylosing spondylitis (HCC)   (+) Biliary dyskinesia   (+) Chronic low back pain   (+) Fibromyalgia   (+) Fibromyalgia syndrome   (+) TMJ (dislocation of temporomandibular joint)      NEURO/PSYCH   (+) Basilar migraine   (+) Chronic low back pain   (+) Chronic migraine without aura without status migrainosus, not intractable   (+) Depression, major, recurrent, moderate (HCC)   (+) Fibromyalgia   (+) Fibromyalgia syndrome   (+) Generalized anxiety disorder   (+) Intractable chronic migraine without aura and with status migrainosus   (+) Intractable migraine with aura without status migrainosus   (+) Migraine headache   (+) Migraine with aura and with status migrainosus   (+) Other complicated headache syndrome   (+) Panic disorder without agoraphobia   (+) Paresthesias      PULMONARY   (+) Pharyngitis due to Streptococcus species   (+) URI (upper respiratory infection)   (+) Upper respiratory infection        Physical Exam    Airway    Mallampati score: II  TM Distance: >3 FB  Neck ROM: full     Dental       Cardiovascular      Pulmonary      Other Findings  post-pubertal.      Anesthesia Plan  ASA Score- 3     Anesthesia Type- general with ASA Monitors.         Additional Monitors:     Airway Plan: ETT.           Plan Factors-Exercise tolerance (METS): >4  METS.    Chart reviewed.        Patient is not a current smoker.  Patient did not smoke on day of surgery.    Obstructive sleep apnea risk education given perioperatively.        Induction- intravenous.    Postoperative Plan- Plan for postoperative opioid use. Planned trial extubation    Perioperative Resuscitation Plan - Level 1 - Full Code.       Informed Consent- Anesthetic plan and risks discussed with patient.  I personally reviewed this patient with the CRNA. Discussed and agreed on the Anesthesia Plan with the CRNA..      Anesthesia plan and consent discussed with Stephanie who expressed understanding and agreement. Risks/benefits and alternatives discussed with patient including possible PONV, sore throat, damage to teeth/lips/gums and possibility of rare anesthetic and surgical emergencies.

## 2024-12-26 NOTE — OP NOTE
OPERATIVE REPORT  PATIENT NAME: Cruz Schneider    :  1990  MRN: [id number]  Pt Location: [facility] OR ROOM 01    SURGERY DATE: 2024    Surgeons and Role:     * Kulwinder Nunn MD - Primary     * Brendon Smith PA-C  The PA was necessary to provide expert assistance; i.e. in the form of providing optimal exposure with retraction, suturing, and assistance with dissection in order to perform the most efficient operation and in order to optimize patient safety in the abscence of a qualified surgical resident.    Preop Diagnosis:  Biliary dyskinesia [K82.8]    Post-Op Diagnosis Codes:     * Biliary dyskinesia [K82.8]    Procedure(s):  CHOLECYSTECTOMY LAPAROSCOPIC    Specimen(s):  ID Type Source Tests Collected by Time Destination   1 :  Tissue Gallbladder TISSUE EXAM Kulwinder Nunn MD 2024 0814        Estimated Blood Loss:   Minimal    Drains:  * No LDAs found *    Anesthesia Type:   General    Operative Indications:  Biliary dyskinesia [K82.8]  The patient is a pleasant 33 yo female presenting with symptomatic biliary dyskinesia for definitive treatment.    Operative Findings:  At the time of the procedure, 4 quadrants of the abdomen were inspected laparoscopically.  The only pathology of note was a chronically inflamed gallbladder.    During the course of the dissection the critical structures identified. Cystic duct dissected out circumferentially.  Cystic artery dissected out circumferentially.      The cholecystectomy completed laparoscopically.  The patient tolerated the procedure well.    Complications:   None    Procedure and Technique:  The patient was taken to the operating room where they were properly identified, monitored and anesthetized. They received antibiotics perioperatively. Venodyne's were placed prior to the induction of anesthesia for DVT prophylaxis. The abdomen prepped and draped under sterile conditions using aseptic technique. Timeout performed.     Skin incised in  the right upper quadrant.. Peritoneal cavity entered bluntly with a 5 mm trocar. Pneumoperitoneum established to 15 mmHg. 4 quadrants of the abdomen and inspected laparoscopically and no additional pathology was identified. 3 additional working ports placed. These were an 11 mm port in the epigastrium and 2 additional 5 mm ports in the right upper quadrant. The patient placed in reverse Trendelenburg, left side down. Gallbladder grasped at its dome retracted cephalad and to the right. Infundibulum grasped and retracted laterally. [place] defined and skeletonized. Cystic duct dissected out circumferentially. Cystic artery dissected out circumferentially. Cystic artery clipped below and divided above with harmonic jennifer. A clip placed on the up side of the cystic duct. Cystic duct clipped twice on the down side and divided between clips. Gallbladder dissected off the liver with harmonic jennifer. The gallbladder was placed in an Endobag and delivered through the epigastric trocar site.     Pneumoperitoneum reestablished to 15 mmHg. Scope advanced and the right upper quadrant inspected. Good hemostasis found. The fascial defect at the epigastrium closed with an 0 Vicryl.  Ports removed.  Skin closed with subcuticular 4-0 Monocryl suture. Wounds infiltrated with half percent Marcaine. The wounds dressed. The patient extubated and taken to recovery in stable condition.     I was present for the entire procedure.    Patient Disposition:  PACU              SIGNATURE: Kulwinder Nunn MD  DATE: December 26, 2024  TIME: 8:19 AM

## 2024-12-26 NOTE — H&P
"H&P - Surgery-General   Name: Cruz Schneider 34 y.o. female I MRN: [id number]  Unit/Bed#: OR [place] I Date of Admission: 12/26/2024   Date of Service: 12/26/2024 I Hospital Day: 0     Assessment & Plan  Biliary dyskinesia  The technical details laparoscopic cholecystectomy with intraoperative cholangiogram were explained as were the risks related to anesthesia, bleeding, infection, postoperative bile leak and bile duct injury necessitating additional surgical interventions.  All questions answered to the satisfaction of the patient and informed consent obtained to proceed.      History of Present Illness   Cruz Schneider is a 34 y.o. female who presents with Biliary dyskinesia for surgery.    Review of Systems  I have reviewed the patient's PMH, PSH, Social History, Family History, Meds, and Allergies    Objective :  Temp:  [97.6 °F (36.4 °C)] 97.6 °F (36.4 °C)  HR:  [79] 79  BP: (137)/(80) 137/80  Resp:  [18] 18  SpO2:  [97 %] 97 %  O2 Device: None (Room air)      Physical Exam    Lab Results: I have reviewed the following results:  No results for input(s): \"WBC\", \"HGB\", \"HCT\", \"PLT\", \"BANDSPCT\", \"SODIUM\", \"K\", \"CL\", \"CO2\", \"BUN\", \"CREATININE\", \"GLUC\", \"CAIONIZED\", \"MG\", \"PHOS\", \"AST\", \"ALT\", \"ALB\", \"TBILI\", \"DBILI\", \"ALKPHOS\", \"PTT\", \"INR\", \"HSTNI0\", \"HSTNI2\", \"BNP\", \"LACTICACID\" in the last 72 hours.    Imaging Results Review: No pertinent imaging studies reviewed.  Other Study Results Review: No additional pertinent studies reviewed.    VTE Pharmacologic Prophylaxis: Heparin  VTE Mechanical Prophylaxis: sequential compression device    Administrative Statements   Topics discussed with the patient / family include goals of care.  "

## 2024-12-26 NOTE — ASSESSMENT & PLAN NOTE
The technical details laparoscopic cholecystectomy with intraoperative cholangiogram were explained as were the risks related to anesthesia, bleeding, infection, postoperative bile leak and bile duct injury necessitating additional surgical interventions.  All questions answered to the satisfaction of the patient and informed consent obtained to proceed.

## 2024-12-27 NOTE — ANESTHESIA POSTPROCEDURE EVALUATION
Post-Op Assessment Note    CV Status:  Stable    Pain management: satisfactory to patient       Mental Status:  Sleepy and arousable   Hydration Status:  Euvolemic   PONV Controlled:  Controlled   Airway Patency:  Patent     Post Op Vitals Reviewed: Yes    No anethesia notable event occurred.    Staff: Anesthesiologist, CRNA       Post-Op Assessment Note    Last Filed PACU Vitals:  Vitals Value Taken Time   Temp 97.8 °F (36.6 °C) 12/26/24 0832   Pulse 90 12/26/24 0925   /85 12/26/24 0925   Resp 16 12/26/24 0925   SpO2 93 % 12/26/24 0925       Modified Jasper:  Activity: 2 (12/26/2024  9:29 AM)  Respiration: 2 (12/26/2024  9:29 AM)  Circulation: 2 (12/26/2024  9:29 AM)  Consciousness: 2 (12/26/2024  9:29 AM)  Oxygen Saturation: 2 (12/26/2024  9:29 AM)  Modified Jasper Score: 10 (12/26/2024  9:29 AM)

## 2024-12-30 ENCOUNTER — TELEPHONE (OUTPATIENT)
Dept: SURGERY | Facility: CLINIC | Age: 34
End: 2024-12-30

## 2024-12-30 PROCEDURE — 88304 TISSUE EXAM BY PATHOLOGIST: CPT | Performed by: PATHOLOGY

## 2025-01-06 ENCOUNTER — TELEPHONE (OUTPATIENT)
Dept: DENTISTRY | Facility: CLINIC | Age: 35
End: 2025-01-06

## 2025-01-07 ENCOUNTER — PROCEDURE VISIT (OUTPATIENT)
Age: 35
End: 2025-01-07
Payer: COMMERCIAL

## 2025-01-07 DIAGNOSIS — D49.2 SKIN NEOPLASM: ICD-10-CM

## 2025-01-07 DIAGNOSIS — D49.2 NEOPLASM OF SKIN: Primary | ICD-10-CM

## 2025-01-07 PROCEDURE — 88305 TISSUE EXAM BY PATHOLOGIST: CPT | Performed by: PATHOLOGY

## 2025-01-07 PROCEDURE — 11310 SHAVE SKIN LESION 0.5 CM/<: CPT | Performed by: FAMILY MEDICINE

## 2025-01-07 NOTE — PROGRESS NOTES
"Assessment/Plan: See procedure note.  Patient to keep area clean and dry and use bacitracin daily as directed.       Diagnoses and all orders for this visit:    Neoplasm of skin  Comments:  See procedure note.  Patient will follow-up as needed per her request.  Will call with pathology reports    Skin neoplasm  -     Tissue Exam  -     Tissue Exam    Other orders  -     Shave lesion            Subjective:        Patient ID: Cruz Schneider is a 34 y.o. female.      Patient is here for removal of 2 lesions from her left face lateral to her left eye which have been there and have changed shape and size.          The following portions of the patient's history were reviewed and updated as appropriate: allergies, current medications, past family history, past medical history, past social history, past surgical history and problem list.      Review of Systems   Skin:  Positive for color change.           Objective:        Depression Screening and Follow-up Plan:   Patient's depression screening was negative with an [place]  Depression Scale score of  .           There were no vitals taken for this visit.         Physical Exam  Skin:     Findings: Lesion present.      Comments: 2 raised irregular lesions measuring 2 mm left lateral face lateral to the left eye       Shave lesion    Date/Time: 2025 10:15 AM    Performed by: Oni Beckham DO  Authorized by: Oni Beckham DO  Universal Protocol:  procedure performed by consultantConsent: Verbal consent obtained.  Risks and benefits: risks, benefits and alternatives were discussed  Consent given by: patient  Time out: Immediately prior to procedure a \"time out\" was called to verify the correct patient, procedure, equipment, support staff and site/side marked as required.  Patient understanding: patient states understanding of the procedure being performed  Patient consent: the patient's understanding of the procedure matches consent given  Procedure consent: " procedure consent matches procedure scheduled  Relevant documents: relevant documents present and verified  Patient identity confirmed: verbally with patient    Number of Lesions: 2  Lesion 1:     Body area: head/neck    Head/neck location: L cheek (Lateral)    Initial size (mm): 2    Final defect size (mm): 3    Malignancy: malignancy unknown      Destruction method: shave removal    Lesion 2:     Body area: head/neck    Head/neck location: L cheek (Medial)    Initial size (mm): 2    Final defect size (mm): 3    Malignancy: malignancy unknown      Destruction method: shave removal      Comments:  Informed consent obtained.  Betadine and alcohol use.  0.5 cc of lidocaine with epinephrine used for both lesions.  Shave excision done at this time.  Electrodesiccation to both biopsy sites.  Bacitracin and dry sterile dressing applied.  Patient tolerated procedure well.  Specimen sent to pathology.

## 2025-01-08 ENCOUNTER — TELEPHONE (OUTPATIENT)
Age: 35
End: 2025-01-08

## 2025-01-08 ENCOUNTER — PATIENT MESSAGE (OUTPATIENT)
Age: 35
End: 2025-01-08

## 2025-01-08 DIAGNOSIS — L03.90 CELLULITIS, UNSPECIFIED CELLULITIS SITE: Primary | ICD-10-CM

## 2025-01-09 DIAGNOSIS — L30.9 DERMATITIS: Primary | ICD-10-CM

## 2025-01-09 RX ORDER — MUPIROCIN CALCIUM 20 MG/G
CREAM TOPICAL 2 TIMES DAILY
Qty: 15 G | Refills: 5 | Status: SHIPPED | OUTPATIENT
Start: 2025-01-09 | End: 2025-01-10 | Stop reason: CLARIF

## 2025-01-09 NOTE — PROGRESS NOTES
Name: Cruz Schneider      : 1990      MRN: [id number]  Encounter Provider: Brendon Smith PA-C  Encounter Date: 1/10/2025   Encounter department: [facility] [place]  :  Assessment & Plan  Biliary dyskinesia  Overall doing well after laparoscopic cholecystectomy. Having mild RUQ pain with movement/activity that is new over past few days. On exam incisions are C/D/I. Abdomen is soft and non-tender. No signs of incisional hernia olivia noted. Suspect pain may be related to muscle strain or other MSK etiology and should resolve with time. Agreeable to call back if symptoms progress. Copy of op note and pathology provided and reviewed. Questions answered, agreeable to the plan.           History of Present Illness   HPI  Cruz Schneider is a 34 y.o. female who presents for her po visit after having a lap wendy w/ ioc 2024   History obtained from: patient    Review of Systems   All other systems reviewed and are negative.    Past Medical History   Past Medical History:   Diagnosis Date    Abnormal Pap smear of cervix     Allergic     Anxiety     Arthritis     Dental caries     Depression     Fibromyalgia, primary     GERD (gastroesophageal reflux disease)     IBS (irritable bowel syndrome)     Migraine     Obesity     Vitamin B12 deficiency      Past Surgical History:   Procedure Laterality Date    COLONOSCOPY N/A 2018    Procedure: COLONOSCOPY;  Surgeon: Stephanie Alston MD;  Location: [facility] GI LAB;  Service: Gastroenterology    SC EXC B9 LESION MRGN XCP SK TG S/N/H/F/G > 4.0CM N/A 2021    Procedure: EXCISION OF PILAR SCALP CYST X 2 AND RIGHT INNER GROIN NEVVUS;  Surgeon: Denis Barron MD;  Location:  MAIN OR;  Service: Plastics    SC LAPS SURG CHOLECYSTECTOMY W/CHOLANGIOGRAPHY N/A 2024    Procedure: CHOLECYSTECTOMY LAPAROSCOPIC;  Surgeon: Kulwinder Nunn MD;  Location: [facility] MAIN [facility];  Service: General    SC REPAIR COMPLEX SCALP/ARM/LEG 2.6-7.5 CM N/A 2021     Procedure: CLOSURE WOUND SCALP X 2 AND RIGHT INNER GROIN;  Surgeon: Denis Barron MD;  Location:  MAIN OR;  Service: Plastics    TONSILLECTOMY      WISDOM TOOTH EXTRACTION       Family History   Problem Relation Age of Onset    Rheum arthritis Mother     Psoriasis Mother     Other Mother     Hypertension Mother     Diabetes unspecified Mother     Sjogren's syndrome Mother     Alcohol abuse Mother     Drug abuse Mother     Anxiety disorder Father     Alcohol abuse Father     Lung cancer Maternal Grandfather     Cancer Other         bone    Diabetes Other     Other Other         High blood pressure    Depression Maternal Grandmother       reports that she has never smoked. She has been exposed to tobacco smoke. She has never used smokeless tobacco. She reports that she does not currently use alcohol. She reports current drug use. Drug: Marijuana.  Current Outpatient Medications on File Prior to Visit   Medication Sig Dispense Refill    acetaminophen (TYLENOL) 500 mg tablet Take 500 mg by mouth every 6 (six) hours as needed for mild pain (1-2 tablets)      ALPRAZolam (XANAX) 0.5 mg tablet Take 1 tablet (0.5 mg total) by mouth 3 (three) times a day as needed for anxiety Do not start before December 11, 2024. 90 tablet 2    ascorbic acid (VITAMIN C) 500 MG tablet Take 500 mg by mouth daily      bisacodyl (DULCOLAX) 5 mg EC tablet Take as directed by GI office (Patient not taking: Reported on 12/12/2024) 4 tablet 0    Botox 200 units SOLR       buPROPion (WELLBUTRIN XL) 300 mg 24 hr tablet Take 1 tablet (300 mg total) by mouth daily 90 tablet 0    Cholecalciferol (Vitamin D3) 50 MCG (2000 UT) capsule Take 1 capsule (2,000 Units total) by mouth daily 90 capsule 1    Cyanocobalamin (Vitamin B-12) 500 MCG SUBL Place 1 tablet (500 mcg total) under the tongue in the morning (Patient not taking: Reported on 12/12/2024) 30 tablet 2    cyanocobalamin 1,000 mcg/mL 1 IM injection every month 3 mL 3    dicyclomine (BENTYL) 10  mg capsule Take 2 capsules (20 mg total) by mouth 3 (three) times a day before meals 120 capsule 1    diphenoxylate-atropine (LOMOTIL) 2.5-0.025 mg per tablet Take 1 tablet by mouth 4 (four) times a day as needed for diarrhea 30 tablet 0    escitalopram (LEXAPRO) 20 mg tablet Take 1 tablet (20 mg total) by mouth daily 90 tablet 0    etonogestrel-ethinyl estradiol (NuvaRing) 0.12-0.015 MG/24HR vaginal ring Insert ring for 21 days then remove for one week. 3 each 4    FeroSul 325 (65 Fe) MG tablet Take 1 tablet (325 mg total) by mouth in the morning 90 tablet 1    gabapentin (NEURONTIN) 300 mg capsule Take 1 capsule (300 mg total) by mouth 3 (three) times a day 90 capsule 5    Galcanezumab-gnlm (Emgality) 120 MG/ML SOAJ Inject as directed 1 ml Subcutaneous  every 30 days 1 mL 11    indomethacin (INDOCIN) 25 mg capsule 1-2 tabs BID prn severe headache with food. Hold toradol. 30 capsule 6    loperamide (IMODIUM) 2 mg capsule Take 1 capsule (2 mg total) by mouth 4 (four) times a day as needed for diarrhea (Patient not taking: Reported on 12/12/2024) 12 capsule 0    magnesium Oxide (MAG-OX) 400 mg TABS Take 1 tablet (400 mg total) by mouth daily 30 tablet 5    methocarbamol (ROBAXIN) 500 mg tablet Take 1 tablet (500 mg total) by mouth 3 (three) times a day (Patient taking differently: Take 500 mg by mouth if needed for muscle spasms) 270 tablet 1    mupirocin (BACTROBAN) 2 % cream Apply topically 2 (two) times a day 15 g 5    nystatin (MYCOSTATIN) cream Apply topically 2 (two) times a day (Patient taking differently: Apply 1 Application topically as needed) 15 g 0    OLANZapine (ZyPREXA) 7.5 mg tablet Take 1 tablet (7.5 mg total) by mouth daily at bedtime Do not take with reglan. 90 tablet 0    ondansetron (ZOFRAN) 4 mg tablet Take 1 tablet (4 mg total) by mouth every 6 (six) hours 30 tablet 2    polyethylene glycol (MIRALAX) 17 g packet Take 17 g by mouth if needed      pramipexole (MIRAPEX) 0.25 mg tablet Take 0.25 mg  by mouth daily at bedtime      prazosin (MINIPRESS) 2 mg capsule Take 1 capsule (2 mg total) by mouth daily at bedtime (Patient taking differently: Take 2 mg by mouth daily at bedtime) 90 capsule 0    RABEprazole (ACIPHEX) 20 MG tablet Take 1 tablet (20 mg total) by mouth 2 (two) times a day 60 tablet 3    Trudhesa 0.725 MG/ACT AERS 1 spray in each nostril for total dose of 1.45mg, may repeat 1 dose after 1 hour. Max 2 doses per 24 hours and 3 doses per week. (Patient taking differently: 1 spray into each nostril if needed 1 spray in each nostril for total dose of 1.45mg, may repeat 1 dose after 1 hour. Max 2 doses per 24 hours and 3 doses per week.) 12 mL 2     Current Facility-Administered Medications on File Prior to Visit   Medication Dose Route Frequency Provider Last Rate Last Admin    cyanocobalamin injection 1,000 mcg  1,000 mcg Intramuscular Q30 Days Oni Beckham,    1,000 mcg at 03/14/24 1450    cyanocobalamin injection 1,000 mcg  1,000 mcg Intramuscular Q30 Days    1,000 mcg at 11/21/24 1704     Allergies   Allergen Reactions    Cimzia [Certolizumab Pegol] Swelling     Face and hands    Desvenlafaxine Other (See Comments)     Other reaction(s): state of confusion    Ixekizumab Vomiting    Seasonal Ic [Octacosanol] Nasal Congestion    Tizanidine Anxiety     Panic attacks         Objective   /88   Pulse 86   Temp 98.1 °F (36.7 °C) (Temporal)   Resp 18   SpO2 99%      Physical Exam  Vitals and nursing note reviewed.   Constitutional:       General: She is not in acute distress.     Appearance: She is well-developed. She is not diaphoretic.   HENT:      Head: Normocephalic and atraumatic.   Eyes:      Conjunctiva/sclera: Conjunctivae normal.      Pupils: Pupils are equal, round, and reactive to light.   Pulmonary:      Effort: No respiratory distress.   Abdominal:      Comments: Soft, flat, non-tender. Incisions C/D/I. No signs of ventral/incisional hernia.   Musculoskeletal:         General:  Normal range of motion.      Cervical back: Normal range of motion.   Skin:     General: Skin is warm and dry.      Capillary Refill: Capillary refill takes less than 2 seconds.   Neurological:      Mental Status: She is alert and oriented to person, place, and time.   Psychiatric:         Behavior: Behavior normal.

## 2025-01-09 NOTE — PSYCH
HOME MONITORING REPORT    INR today:   Results for orders placed or performed in visit on 01/09/25   Protime-INR   Result Value Ref Range    INR 1.60        INR Goal: 2.0-3.0    Dosing Plan  As of 1/9/2025      TTR:  32.9% (6.6 y)   Full warfarin instructions:  1/9: 7.5 mg; Otherwise 2.5 mg every Tue, Sat; 5 mg all other days                PLAN: Advised patient/caregiver via voicemail to increase her dose tonight to 7.5 mg then continue current dose and not to miss any doses this week. Recheck in one week.  Asked that she call back if she has any questions.       Electronically signed by Regina Curtis MD on 1/9/2025 at 4:09 PM     Virtual Regular Visit    Verification of patient location:    Patient is located in the following state in which I hold an active license [place]    Assessment/Plan:    Problem List Items Addressed This Visit        Other    Generalized anxiety disorder (Chronic)    Depression, major, recurrent, moderate (HCC) - Primary (Chronic)        Goals addressed in session: Goal 1      Reason for visit is No chief complaint on file  Encounter provider APARNA Harris    Provider located at [address] [place] [id number] [phone]    Recent Visits  Date Type Provider Dept   05/10/22 Office Visit Steffi Ayoub DO [facility] [address] recent visits within past 7 days and meeting all other requirements  Future Appointments  No visits were found meeting these conditions  Showing future appointments within next 150 days and meeting all other requirements     The patient was identified by name and date of birth  Bertha Li was informed that this is a telemedicine visit and that the visit is being conducted throughEpic Embedded and patient was informed this is a secure, HIPAA-complaint platform  She agrees to proceed     My office door was closed  No one else was in the room  She acknowledged consent and understanding of privacy and security of the video platform  The patient has agreed to participate and understands they can discontinue the visit at any time  Patient is aware this is a billable service  Jacques Quesada is a 28 y o  female  DATA: Met with Stephanie for scheduled individual session  Topics of discussion included relationship with significant other, family stressors, relationships with family, physical health concerns, relationships with friends, mood regulation and symptoms and grief and loss   "I feel like Merilee Kin made me lose a lot, and I have so much anger toward him " Cleve Gutierrez states that she found out that her ex-boyfriend is in a new relationship and is planning a move to [place] to be with his new girlfriend  Cleve Gutierrez states that she is very angry and expressed feelings of betrayal  Cleve Gutierrez states, "I think I'm having flashbacks " She described these events, and we discussed how long they last and what she is able to do to bring herself back to her baseline  We spent time discussing normal grief responses when a relationship ends  This clinician encouraged her to allow herself to feel her feelings, while also setting some positive goals for herself  Cleve Gutierrez discussed her goals to get healthy again  She has been starting to set small, measurable goals  We will continue to work on this, as we move forward with her treatment  Client shows evidence of utilizing Mindfulness-based strategies, emotion regulation skills and distress tolerance skills skills to manage mental health symptoms  During this session, this clinician used the following therapeutic modalities: supportive psychotherapy, client-centered therapy, mindfulness-based strategies, DBT-informed skills, Motivational Interviewing and solution-focused therapy  ASSESSMENT: Cleve Gutierrez presents with a somewhat dysthymic mood  Her affect is normal range and intensity, appropriate  Cleve Gutierrez exhibits good therapeutic rapport with this clinician  Cleve Gutierrez continues to exhibit willingness to work on treatment goals and objectives  Cleve Gutierrez presents with a minimal risk of suicide, minimal risk of self-harm, and minimal risk of harm to others  PLAN: Cleve Gutierrez will return in approximately three weeks for the next scheduled session  Between sessions, Cleve Gutierrez will continue to set small goals for herself  She will work on increasing her physical and emotional health (eating better, improving sleep hygiene, going outdoors and increasing movement)  She will report back during the next session re: successes and barriers   At the next session, this clinician will use supportive psychotherapy, client-centered therapy, mindfulness-based strategies, DBT-informed skills, Motivational Interviewing and solution-focused therapy to address her mood regulation and relationship concerns, in an effort to assist Alvin Marquis with meeting treatment goals  HPI     Past Medical History:   Diagnosis Date    Abnormal Pap smear of cervix     Allergic     Anxiety     Arthritis     Depression     Diabetes mellitus (HCC)     Fibromyalgia, primary     Hypertension     IBS (irritable bowel syndrome)     Migraine     Obesity     Vitamin B12 deficiency        Past Surgical History:   Procedure Laterality Date    COLONOSCOPY N/A 5/8/2018    Procedure: COLONOSCOPY;  Surgeon: Danielle Telles MD;  Location: [facility] GI LAB; Service: Gastroenterology    MD EXC SKIN BENIG >4 CM REMAINDR BODY N/A 7/1/2021    Procedure: EXCISION OF PILAR SCALP CYST X 2 AND RIGHT INNER GROIN NEVVUS;  Surgeon: Fidelia Callejas MD;  Location: [address] [place] [address];  Service: Plastics    MD RECMPL WND SCALP,EXTR 2 6-7 5 CM N/A 7/1/2021    Procedure: CLOSURE WOUND SCALP X 2 AND RIGHT INNER GROIN;  Surgeon: Fidelia Callejas MD;  Location: [address] [place] [address];  Service: Plastics    TONSILLECTOMY      WISDOM TOOTH EXTRACTION         Current Outpatient Medications   Medication Sig Dispense Refill    Aimovig 140 MG/ML SOAJ Inject 140mg under the skin every 30 (thirty) days   1 mL 10    ALPRAZolam (XANAX) 0 5 mg tablet Take 1 tablet (0 5 mg total) by mouth 2 (two) times a day as needed for anxiety 60 tablet 2    ARIPiprazole (ABILIFY) 2 mg tablet Take 1 tablet (2 mg total) by mouth daily 30 tablet 2    B-D 3CC LUER-DEANN SYR 25GX1" 25G X 1" 3 ML MISC        buPROPion (WELLBUTRIN XL) 150 mg 24 hr tablet Take 1 tablet (150 mg total) by mouth daily 30 tablet 2    Certolizumab Pegol (Cimzia Starter Kit) 6 X 200 MG/ML KIT Inject 1 application under the skin every 30 (thirty) days   (Patient not taking: Reported on 5/10/2022 )  escitalopram (LEXAPRO) 20 mg tablet Take 1 tablet (20 mg total) by mouth daily 30 tablet 2    etonogestrel-ethinyl estradiol (NuvaRing) 0 12-0 015 MG/24HR vaginal ring Insert ring for 21 days then remove for one week  3 each 3    indomethacin (INDOCIN) 25 mg capsule 1 tab BID prn severe headache with food  Hold toradol  30 capsule 0    Ixekizumab (Taltz) 80 MG/ML SOAJ Inject under the skin (Patient not taking: Reported on 5/10/2022 )      ketorolac (TORADOL) 10 mg tablet Take 1 tablet (10 mg total) by mouth every 6 (six) hours as needed (migraine) Max 2-3 per week   10 tablet 0    Lidocaine Viscous HCl (XYLOCAINE) 2 % mucosal solution Swish and spit 10 mL 4 (four) times a day as needed for mouth pain or discomfort (Patient not taking: Reported on 4/18/2022 ) 100 mL 2    mebendazole (VERMOX) 100 MG chewable tablet Chew 1 tablet (100 mg total) 2 (two) times a day for 3 days 6 tablet 0    melatonin 1 mg 1-2 tabs qhs prn insomnia (Patient not taking: Reported on 4/18/2022 ) 60 tablet 2    metFORMIN (GLUCOPHAGE-XR) 500 mg 24 hr tablet Take 2 tablets (1,000 mg total) by mouth daily with breakfast 60 tablet 2    olmesartan (BENICAR) 20 mg tablet Take 1 tablet (20 mg total) by mouth daily 30 tablet 1    onabotulinumtoxin A (BOTOX) 100 units Inject as directed      ondansetron (ZOFRAN) 4 mg tablet Take 1 tablet (4 mg total) by mouth every 6 (six) hours 12 tablet 0    Syringe/Needle, Disp, (SYRINGE 3CC/12ZA8-6/4") 27G X 1-1/4" 3 ML MISC Use for IM injection of ketorolac  4 each 0    vitamin B-12 (VITAMIN B-12) 1,000 mcg tablet Take 1,000 mcg by mouth daily  (Patient not taking: Reported on 4/18/2022 )       Current Facility-Administered Medications   Medication Dose Route Frequency Provider Last Rate Last Admin    cyanocobalamin injection 1,000 mcg  1,000 mcg Intramuscular Q30 Days Laura Pace DO   1,000 mcg at 08/03/20 0859    cyanocobalamin injection 1,000 mcg  1,000 mcg Intramuscular Q14 Days Vee Burgos Chapincito, DO   1,000 mcg at 05/18/20 1146    cyanocobalamin injection 1,000 mcg  1,000 mcg Intramuscular Q30 Days Christinahector Hal, DO   1,000 mcg at 09/01/20 1129        Allergies   Allergen Reactions    Cimzia [Certolizumab Pegol] Swelling    Desvenlafaxine      Other reaction(s): state of confusion    Valproic Acid Other (See Comments)     Other reaction(s): dilated pupils, "schizi"    Tizanidine Anxiety       Review of Systems    Video Exam    There were no vitals filed for this visit  Physical Exam     I spent 45 minutes directly with the patient during this visit    [address] verbally agrees to participate in [employer]  Pt is aware that [employer] could be limited without vital signs or the ability to perform a full hands-on physical exam  Cruz Brambila understands she or the provider may request at any time to terminate the video visit and request the patient to seek care or treatment in person

## 2025-01-10 ENCOUNTER — TELEPHONE (OUTPATIENT)
Age: 35
End: 2025-01-10

## 2025-01-10 ENCOUNTER — OFFICE VISIT (OUTPATIENT)
Dept: SURGERY | Facility: CLINIC | Age: 35
End: 2025-01-10

## 2025-01-10 VITALS
DIASTOLIC BLOOD PRESSURE: 88 MMHG | SYSTOLIC BLOOD PRESSURE: 138 MMHG | RESPIRATION RATE: 18 BRPM | HEART RATE: 86 BPM | OXYGEN SATURATION: 99 % | TEMPERATURE: 98.1 F

## 2025-01-10 DIAGNOSIS — L29.9 GENERALIZED PRURITUS: Primary | ICD-10-CM

## 2025-01-10 DIAGNOSIS — K82.8 BILIARY DYSKINESIA: Primary | ICD-10-CM

## 2025-01-10 PROCEDURE — 99024 POSTOP FOLLOW-UP VISIT: CPT | Performed by: PHYSICIAN ASSISTANT

## 2025-01-10 RX ORDER — MUPIROCIN 20 MG/G
OINTMENT TOPICAL 3 TIMES DAILY
Qty: 15 G | Refills: 5 | Status: SHIPPED | OUTPATIENT
Start: 2025-01-10

## 2025-01-10 NOTE — ASSESSMENT & PLAN NOTE
Overall doing well after laparoscopic cholecystectomy. Having mild RUQ pain with movement/activity that is new over past few days. On exam incisions are C/D/I. Abdomen is soft and non-tender. No signs of incisional hernia olivia noted. Suspect pain may be related to muscle strain or other MSK etiology and should resolve with time. Agreeable to call back if symptoms progress. Copy of op note and pathology provided and reviewed. Questions answered, agreeable to the plan.

## 2025-01-10 NOTE — TELEPHONE ENCOUNTER
PA for mupirocin 2% cream SUBMITTED to [employer]    via    []CMM-KEY:   [x]Surescripts-Case ID # [id number]   []Availity-Auth ID # NDC #   []Faxed to plan   []Other website   []Phone call Case ID #     [x]PA sent as URGENT    All office notes, labs and other pertaining documents and studies sent. Clinical questions answered. Awaiting determination from insurance company.     Turnaround time for your insurance to make a decision on your Prior Authorization can take 7-21 business days.

## 2025-01-10 NOTE — TELEPHONE ENCOUNTER
[facility] pharmacy contacted the office this morning in regards to the mupirocin (BACTROBAN) 2 % cream that was prescribed yesterday. Informed that patient's insurance won't cover the cream but they will cover it in OINTMENT. Asking if the prescription can be switched to an ointment instead of the cream. Please contact Sheri's back with an update, thank you.

## 2025-01-12 ENCOUNTER — PATIENT MESSAGE (OUTPATIENT)
Age: 35
End: 2025-01-12

## 2025-01-12 DIAGNOSIS — E66.01 MORBID OBESITY WITH BMI OF 40.0-44.9, ADULT (HCC): Primary | ICD-10-CM

## 2025-01-13 ENCOUNTER — RESULTS FOLLOW-UP (OUTPATIENT)
Age: 35
End: 2025-01-13

## 2025-01-13 PROCEDURE — 88305 TISSUE EXAM BY PATHOLOGIST: CPT | Performed by: PATHOLOGY

## 2025-01-13 RX ORDER — SEMAGLUTIDE 0.25 MG/.5ML
0.25 INJECTION, SOLUTION SUBCUTANEOUS WEEKLY
Qty: 2 ML | Refills: 0 | Status: SHIPPED | OUTPATIENT
Start: 2025-01-13 | End: 2025-01-14 | Stop reason: SDUPTHER

## 2025-01-14 ENCOUNTER — OFFICE VISIT (OUTPATIENT)
Age: 35
End: 2025-01-14
Payer: COMMERCIAL

## 2025-01-14 VITALS
OXYGEN SATURATION: 99 % | BODY MASS INDEX: 43.05 KG/M2 | HEART RATE: 87 BPM | TEMPERATURE: 98.6 F | SYSTOLIC BLOOD PRESSURE: 140 MMHG | HEIGHT: 63 IN | DIASTOLIC BLOOD PRESSURE: 96 MMHG | WEIGHT: 243 LBS

## 2025-01-14 DIAGNOSIS — J06.9 ACUTE URI: ICD-10-CM

## 2025-01-14 DIAGNOSIS — E66.01 MORBID OBESITY WITH BMI OF 40.0-44.9, ADULT (HCC): ICD-10-CM

## 2025-01-14 DIAGNOSIS — G25.81 RESTLESS LEG SYNDROME: Primary | ICD-10-CM

## 2025-01-14 PROCEDURE — 99214 OFFICE O/P EST MOD 30 MIN: CPT | Performed by: FAMILY MEDICINE

## 2025-01-14 RX ORDER — CEFUROXIME AXETIL 500 MG/1
500 TABLET ORAL EVERY 12 HOURS SCHEDULED
Qty: 14 TABLET | Refills: 0 | Status: SHIPPED | OUTPATIENT
Start: 2025-01-14 | End: 2025-01-21

## 2025-01-14 RX ORDER — PRAMIPEXOLE DIHYDROCHLORIDE 0.25 MG/1
0.25 TABLET ORAL
Qty: 30 TABLET | Refills: 1 | Status: SHIPPED | OUTPATIENT
Start: 2025-01-14

## 2025-01-14 RX ORDER — SEMAGLUTIDE 0.25 MG/.5ML
0.25 INJECTION, SOLUTION SUBCUTANEOUS WEEKLY
Qty: 2 ML | Refills: 0 | Status: SHIPPED | OUTPATIENT
Start: 2025-01-14

## 2025-01-14 NOTE — PROGRESS NOTES
Assessment/Plan:       Diagnoses and all orders for this visit:    Restless leg syndrome  Comments:  Continue with Mirapex as directed refills given.  Orders:  -     pramipexole (MIRAPEX) 0.25 mg tablet; Take 1 tablet (0.25 mg total) by mouth daily at bedtime    Acute URI  Comments:  Patient use Bactroban as directed.  Patient use Ceftin as directed  Orders:  -     cefuroxime (CEFTIN) 500 mg tablet; Take 1 tablet (500 mg total) by mouth every 12 (twelve) hours for 7 days    Morbid obesity with BMI of 40.0-44.9, adult ([facility])  Comments:  Patient will restart Wegovy.  Refills given.  Orders:  -     Semaglutide-Weight Management (Wegovy) 0.25 MG/0.5ML; Inject 0.5 mL (0.25 mg total) under the skin once a week Inject 0.25 mg under the skin weekly            Subjective:        Patient ID: Cruz Schneider is a 34 y.o. female.      Patient is here with some left ear pain as well as some scabbing in the left nostril.  Some pain into the left neck also.  Mild sore throat.  No fever noted.  No right ear involvement.  No cough.  Patient only using Mirapex at night for restless legs stable at this time.    Sore Throat   Associated symptoms include ear pain and neck pain.         The following portions of the patient's history were reviewed and updated as appropriate: allergies, current medications, past family history, past medical history, past social history, past surgical history and problem list.      Review of Systems   Constitutional: Negative.    HENT:  Positive for ear pain and sore throat.    Eyes: Negative.    Respiratory: Negative.     Cardiovascular: Negative.    Gastrointestinal: Negative.    Endocrine: Negative.    Genitourinary: Negative.    Musculoskeletal:  Positive for neck pain.   Skin: Negative.    Allergic/Immunologic: Negative.    Neurological: Negative.    Hematological: Negative.    Psychiatric/Behavioral: Negative.             Objective:               /96 (BP Location: Right arm, Patient Position:  "Sitting, Cuff Size: Standard)   Pulse 87   Temp 98.6 °F (37 °C)   Ht 5' 3\" (1.6 m)   Wt 110 kg (243 lb)   SpO2 99%   BMI 43.05 kg/m²          Physical Exam  Vitals and nursing note reviewed.   Constitutional:       General: She is not in acute distress.     Appearance: Normal appearance. She is not ill-appearing, toxic-appearing or diaphoretic.   HENT:      Head: Normocephalic and atraumatic.      Right Ear: Tympanic membrane, ear canal and external ear normal. There is no impacted cerumen.      Left Ear: Tympanic membrane, ear canal and external ear normal. There is no impacted cerumen.      Nose: Rhinorrhea present.      Comments: Scabbing left naris     Mouth/Throat:      Mouth: Mucous membranes are moist.      Pharynx: Oropharyngeal exudate present. No posterior oropharyngeal erythema.   Eyes:      General: No scleral icterus.        Right eye: No discharge.         Left eye: No discharge.   Neck:      Vascular: No carotid bruit.   Cardiovascular:      Rate and Rhythm: Normal rate and regular rhythm.      Pulses: Normal pulses.      Heart sounds: Normal heart sounds. No murmur heard.     No friction rub. No gallop.   Pulmonary:      Effort: Pulmonary effort is normal. No respiratory distress.      Breath sounds: Normal breath sounds. No stridor. No wheezing, rhonchi or rales.   Chest:      Chest wall: No tenderness.   Musculoskeletal:         General: No swelling, deformity or signs of injury.      Cervical back: Normal range of motion and neck supple. No rigidity. No muscular tenderness.      Right lower leg: No edema.      Left lower leg: No edema.   Lymphadenopathy:      Cervical: No cervical adenopathy.   Skin:     General: Skin is warm and dry.      Capillary Refill: Capillary refill takes less than 2 seconds.      Coloration: Skin is not jaundiced.      Findings: No bruising, erythema, lesion or rash.   Neurological:      Mental Status: She is alert and oriented to person, place, and time. Mental " status is at baseline.   Psychiatric:         Mood and Affect: Mood normal.         Behavior: Behavior normal.         Thought Content: Thought content normal.         Judgment: Judgment normal.

## 2025-01-15 ENCOUNTER — TELEPHONE (OUTPATIENT)
Age: 35
End: 2025-01-15

## 2025-01-15 NOTE — TELEPHONE ENCOUNTER
PA for mupirocin 2% cream CANCELLED     Due to     []Approval on file-dates approved   []Medication already on Formulary  []Brand Name Preferred  []Patient no longer covered by insurance  [x]Medication changed

## 2025-01-15 NOTE — TELEPHONE ENCOUNTER
PA for Wegovy 0.25MG/0.5ML SUBMITTED to [employer]    via    []CMM-KEY:   [x]Surescripts-Case ID #[id number]    []Availity-Auth ID # NDC #   []Faxed to plan   []Other website   []Phone call Case ID #     [x]PA sent as URGENT    All office notes, labs and other pertaining documents and studies sent. Clinical questions answered. Awaiting determination from insurance company.     Turnaround time for your insurance to make a decision on your Prior Authorization can take 7-21 business days.

## 2025-01-16 DIAGNOSIS — F43.12 CHRONIC POST-TRAUMATIC STRESS DISORDER (PTSD): ICD-10-CM

## 2025-01-16 RX ORDER — MUPIROCIN CALCIUM 20 MG/G
CREAM TOPICAL
Qty: 30 G | Refills: 5 | Status: SHIPPED | OUTPATIENT
Start: 2025-01-16

## 2025-01-16 RX ORDER — PRAZOSIN HYDROCHLORIDE 2 MG/1
2 CAPSULE ORAL
Qty: 90 CAPSULE | Refills: 0 | Status: SHIPPED | OUTPATIENT
Start: 2025-01-16

## 2025-01-16 NOTE — TELEPHONE ENCOUNTER
PA for Wegovy 0.25MG/0.5ML APPROVED     Date(s) approved: 01/15/2025-07/15/2025    Case #[id number]      Patient advised by          [x]MyChart Message  []Phone call   []LMOM  []L/M to call office as no active Communication consent on file  [x]Unable to leave detailed message as VM not approved on Communication consent       Pharmacy advised by    [x]Fax  []Phone call    Approval letter scanned into Media Yes

## 2025-01-17 ENCOUNTER — TELEMEDICINE (OUTPATIENT)
Dept: BEHAVIORAL/MENTAL HEALTH CLINIC | Facility: CLINIC | Age: 35
End: 2025-01-17
Payer: COMMERCIAL

## 2025-01-17 DIAGNOSIS — F41.1 GENERALIZED ANXIETY DISORDER: Chronic | ICD-10-CM

## 2025-01-17 DIAGNOSIS — F33.1 DEPRESSION, MAJOR, RECURRENT, MODERATE (HCC): Primary | Chronic | ICD-10-CM

## 2025-01-17 PROCEDURE — 90837 PSYTX W PT 60 MINUTES: CPT | Performed by: SOCIAL WORKER

## 2025-01-20 ENCOUNTER — TELEPHONE (OUTPATIENT)
Age: 35
End: 2025-01-20

## 2025-01-20 DIAGNOSIS — G43.709 CHRONIC MIGRAINE WITHOUT AURA WITHOUT STATUS MIGRAINOSUS, NOT INTRACTABLE: Primary | ICD-10-CM

## 2025-01-20 NOTE — TELEPHONE ENCOUNTER
Pt called and states that she is trying to get trudhesa filled and the pharm was going ot be in contact with out office.  PA is needed.  Advised we will complete PA and call her with determination.    [phone] to leave detailed message    Vilma KHALIL completed on CMM  Key: [id number]

## 2025-01-20 NOTE — TELEPHONE ENCOUNTER
PA for mupirocin 2% cream SUBMITTED to [employer]    via    []CMM-KEY:   [x]Surescripts-Case ID # [id number]   []Availity-Auth ID # NDC #   []Faxed to plan   []Other website   []Phone call Case ID #     [x]PA sent as URGENT    All office notes, labs and other pertaining documents and studies sent. Clinical questions answered. Awaiting determination from insurance company.     Turnaround time for your insurance to make a decision on your Prior Authorization can take 7-21 business days.

## 2025-01-21 RX ORDER — DIHYDROERGOTAMINE MESYLATE 4 MG/ML
1 SPRAY NASAL AS NEEDED
Qty: 8 ML | Refills: 0 | Status: SHIPPED | OUTPATIENT
Start: 2025-01-21

## 2025-01-21 NOTE — TELEPHONE ENCOUNTER
[facility]    Pharm tech stated that dihydroergotamine (MIGRANAL) 4 MG/ML nasal spray needs a PA.

## 2025-01-21 NOTE — TELEPHONE ENCOUNTER
PA for mupirocin 2% cream DENIED    Reason:    Message sent to office clinical pool Yes    Denial letter scanned into Media Yes    Appeal started No (Provider will need to decide if appeal is warranted and send clinical documentation to Prior Authorization Team for initiation.)    **Please follow up with your patient regarding denial and next steps**

## 2025-01-21 NOTE — TELEPHONE ENCOUNTER
Called pharm, he is not able to tell me at this time if PA is needed,.  He will process this later and will call our office back if a PA is needed.      Left detailed message for pt regarding denial and that DHE NS was sent to pharm.  Advised her of above.  Also advised that if she tries YONY CARABALLO and it is ineffective to call office back.

## 2025-01-27 NOTE — TELEPHONE ENCOUNTER
Dihydroergotamine PA has been approved through 1/21/26    Called [facility]'s pharmacy and left a message on their answering machine making them aware of the approval and to process 8 ml per 30 days and for a call back if any questions.

## 2025-01-31 ENCOUNTER — TELEMEDICINE (OUTPATIENT)
Dept: BEHAVIORAL/MENTAL HEALTH CLINIC | Facility: CLINIC | Age: 35
End: 2025-01-31
Payer: COMMERCIAL

## 2025-01-31 DIAGNOSIS — F33.1 DEPRESSION, MAJOR, RECURRENT, MODERATE (HCC): Primary | Chronic | ICD-10-CM

## 2025-01-31 DIAGNOSIS — F41.1 GENERALIZED ANXIETY DISORDER: Chronic | ICD-10-CM

## 2025-01-31 PROCEDURE — 90837 PSYTX W PT 60 MINUTES: CPT | Performed by: SOCIAL WORKER

## 2025-01-31 NOTE — PSYCH
Virtual Regular Visit    Verification of patient location:    Patient is located at Home in the following state in which I hold an active license [place]      Assessment/Plan:    Problem List Items Addressed This Visit          Behavioral Health    Generalized anxiety disorder (Chronic)    Depression, major, recurrent, moderate (HCC) - Primary (Chronic)       Goals addressed in session: Goal 1     Depression Follow-up Plan Completed: Yes    Reason for visit is   Chief Complaint   Patient presents with    Virtual Regular Visit          Encounter provider APARNA Fox      Recent Visits  Date Type Provider Dept   01/31/25 Telemedicine APARNA Fox [facility] Therapist [place]   Showing recent visits within past 7 days and meeting all other requirements  Future Appointments  No visits were found meeting these conditions.  Showing future appointments within next 150 days and meeting all other requirements       The patient was identified by name and date of birth. Cruz Schneider was informed that this is a telemedicine visit and that the visit is being conducted throughthe Epic Embedded platform. She agrees to proceed..  My office door was closed. No one else was in the room.  She acknowledged consent and understanding of privacy and security of the video platform. The patient has agreed to participate and understands they can discontinue the visit at any time.    Patient is aware this is a billable service.     Subjective  Cruz Schneider is a 34 y.o. female.      HPI     Past Medical History:   Diagnosis Date    Abnormal Pap smear of cervix     Allergic     Anxiety     Arthritis     Dental caries     Depression     Fibromyalgia, primary     GERD (gastroesophageal reflux disease)     IBS (irritable bowel syndrome)     Migraine     Obesity     Vitamin B12 deficiency        Past Surgical History:   Procedure Laterality Date    COLONOSCOPY N/A 5/8/2018    Procedure: COLONOSCOPY;  Surgeon: Stephanie  MD Alecia;  Location: [facility];  Service: Gastroenterology    CO EXC B9 LESION MRGN XCP SK TG S/N/H/F/G > 4.0CM N/A 7/1/2021    Procedure: EXCISION OF PILAR SCALP CYST X 2 AND RIGHT INNER GROIN NEVVUS;  Surgeon: Denis Barron MD;  Location:  MAIN OR;  Service: Plastics    CO LAPS SURG CHOLECYSTECTOMY W/CHOLANGIOGRAPHY N/A 12/26/2024    Procedure: CHOLECYSTECTOMY LAPAROSCOPIC;  Surgeon: Kulwinder Nunn MD;  Location: [facility];  Service: General    CO REPAIR COMPLEX SCALP/ARM/LEG 2.6-7.5 CM N/A 7/1/2021    Procedure: CLOSURE WOUND SCALP X 2 AND RIGHT INNER GROIN;  Surgeon: Denis Barron MD;  Location:  MAIN OR;  Service: Plastics    TONSILLECTOMY      WISDOM TOOTH EXTRACTION         Current Outpatient Medications   Medication Sig Dispense Refill    acetaminophen (TYLENOL) 500 mg tablet Take 500 mg by mouth every 6 (six) hours as needed for mild pain (1-2 tablets)      ALPRAZolam (XANAX) 0.5 mg tablet Take 1 tablet (0.5 mg total) by mouth 3 (three) times a day as needed for anxiety Do not start before December 11, 2024. 90 tablet 2    ascorbic acid (VITAMIN C) 500 MG tablet Take 500 mg by mouth daily      bisacodyl (DULCOLAX) 5 mg EC tablet Take as directed by GI office (Patient not taking: Reported on 12/12/2024) 4 tablet 0    Botox 200 units SOLR       buPROPion (WELLBUTRIN XL) 300 mg 24 hr tablet Take 1 tablet (300 mg total) by mouth daily 90 tablet 0    Cholecalciferol (Vitamin D3) 50 MCG (2000 UT) capsule Take 1 capsule (2,000 Units total) by mouth daily 90 capsule 1    Cyanocobalamin (Vitamin B-12) 500 MCG SUBL Place 1 tablet (500 mcg total) under the tongue in the morning (Patient not taking: Reported on 12/12/2024) 30 tablet 2    cyanocobalamin 1,000 mcg/mL 1 IM injection every month 3 mL 3    dicyclomine (BENTYL) 10 mg capsule Take 2 capsules (20 mg total) by mouth 3 (three) times a day before meals (Patient not taking: Reported on 1/14/2025) 120 capsule 1    dihydroergotamine (MIGRANAL) 4  MG/ML nasal spray 1 spray into each nostril as needed for migraine Use in one nostril as directed.  No more than 4 sprays in one hour 8 mL 0    diphenoxylate-atropine (LOMOTIL) 2.5-0.025 mg per tablet Take 1 tablet by mouth 4 (four) times a day as needed for diarrhea 30 tablet 0    escitalopram (LEXAPRO) 20 mg tablet Take 1 tablet (20 mg total) by mouth daily 90 tablet 0    etonogestrel-ethinyl estradiol (NuvaRing) 0.12-0.015 MG/24HR vaginal ring Insert ring for 21 days then remove for one week. 3 each 4    FeroSul 325 (65 Fe) MG tablet Take 1 tablet (325 mg total) by mouth in the morning 90 tablet 1    gabapentin (NEURONTIN) 300 mg capsule Take 1 capsule (300 mg total) by mouth 3 (three) times a day 90 capsule 5    Galcanezumab-gnlm (Emgality) 120 MG/ML SOAJ Inject as directed 1 ml Subcutaneous  every 30 days 1 mL 11    indomethacin (INDOCIN) 25 mg capsule 1-2 tabs BID prn severe headache with food. Hold toradol. 30 capsule 6    loperamide (IMODIUM) 2 mg capsule Take 1 capsule (2 mg total) by mouth 4 (four) times a day as needed for diarrhea (Patient not taking: Reported on 12/12/2024) 12 capsule 0    magnesium Oxide (MAG-OX) 400 mg TABS Take 1 tablet (400 mg total) by mouth daily 30 tablet 5    methocarbamol (ROBAXIN) 500 mg tablet Take 1 tablet (500 mg total) by mouth 3 (three) times a day 270 tablet 1    mupirocin (BACTROBAN) 2 % cream Apply bid to areas needed 30 g 5    mupirocin (BACTROBAN) 2 % ointment Apply topically 3 (three) times a day 15 g 5    nystatin (MYCOSTATIN) cream Apply topically 2 (two) times a day (Patient taking differently: Apply 1 Application topically as needed) 15 g 0    OLANZapine (ZyPREXA) 7.5 mg tablet Take 1 tablet (7.5 mg total) by mouth daily at bedtime Do not take with reglan. 90 tablet 0    ondansetron (ZOFRAN) 4 mg tablet Take 1 tablet (4 mg total) by mouth every 6 (six) hours 30 tablet 2    polyethylene glycol (MIRALAX) 17 g packet Take 17 g by mouth if needed      pramipexole  (MIRAPEX) 0.25 mg tablet Take 1 tablet (0.25 mg total) by mouth daily at bedtime 30 tablet 1    prazosin (MINIPRESS) 2 mg capsule Take 1 capsule (2 mg total) by mouth daily at bedtime 90 capsule 0    RABEprazole (ACIPHEX) 20 MG tablet Take 1 tablet by mouth twice daily 60 tablet 5    Semaglutide-Weight Management (Wegovy) 0.25 MG/0.5ML Inject 0.5 mL (0.25 mg total) under the skin once a week Inject 0.25 mg under the skin weekly 2 mL 0    Trudhesa 0.725 MG/ACT AERS 1 spray in each nostril for total dose of 1.45mg, may repeat 1 dose after 1 hour. Max 2 doses per 24 hours and 3 doses per week. 12 mL 2     Current Facility-Administered Medications   Medication Dose Route Frequency Provider Last Rate Last Admin    cyanocobalamin injection 1,000 mcg  1,000 mcg Intramuscular Q30 Days Oni Beckham DO   1,000 mcg at 03/14/24 1450    cyanocobalamin injection 1,000 mcg  1,000 mcg Intramuscular Q30 Days    1,000 mcg at 11/21/24 1704        Allergies   Allergen Reactions    Cimzia [Certolizumab Pegol] Swelling     Face and hands    Desvenlafaxine Other (See Comments)     Other reaction(s): state of confusion    Ixekizumab Vomiting    Seasonal Ic [Octacosanol] Nasal Congestion    Tizanidine Anxiety     Panic attacks       Review of Systems    Video Exam    There were no vitals filed for this visit.    Physical Exam     Behavioral Health Psychotherapy Progress Note    Psychotherapy Provided: Individual Psychotherapy     1. Depression, major, recurrent, moderate (HCC)        2. Generalized anxiety disorder            Goals addressed in session: Goal 1     DATA: Met with Stephanie. Stephanie states that she is in a flare-- she believes this is a result of the cold weather. She states that the symptoms include flu-like symptoms-- including skin sensitivity. She is scheduled for an evaluation for aquatherapy on Tuesday. She plans to start walking again, once the weather gets better, and she also plans to join the gym with her mother, to  begin some strength training. She states that she has gotten some healthy snacks and is starting to improve her nutrition. She and her mother went to play bingo together. She states that there are not any people there who are her age, but she is hoping that some of her friends might join them sometime in the future, which would at least include her friends in a different setting. She states that she is trying to get out of the house more frequently-- even when spending time only with her mother. She states that she is somewhat worried about her uncle's mental health. She has noticed that her uncle's mental health is impacted by the winter weather and being stuck inside more. Stephanie states that she has been doing well with remembering to take her medications. She also states that she has been doing moshe art. Stephanie talked about relationships with friends. She discussed an incident with her friend, Jacqueline. She states that Jacqueline was rude and demanding with her in a store, and Stephanie was able to stand up for herself and effectively advocated for herself. She states that she felt good about her ability to advocate for herself in this situation. Stephanie discussed her ability to manage her income. She states that she feels mostly good about how she is managing her money, but she would like to work on learning how to manage her money a bit better.     During this session, this clinician used the following therapeutic modalities: Client-centered Therapy, Dialectical Behavior Therapy, Mindfulness-based Strategies, Motivational Interviewing, Solution-Focused Therapy, and Supportive Psychotherapy    Substance Abuse was not addressed during this session. If the client is diagnosed with a co-occurring substance use disorder, please indicate any changes in the frequency or amount of use: n/a. Stage of change for addressing substance use diagnoses: No substance use/Not applicable    ASSESSMENT:  Stephanie Schneider presents with a  "Euthymic/ normal mood.     her affect is Normal range and intensity, which is congruent, with her mood and the content of the session. The client has made progress on their goals.    Stephanie Schneider presents with a minimal risk of suicide, minimal risk of self-harm, and minimal risk of harm to others.    For any risk assessment that surpasses a \"low\" rating, a safety plan must be developed.    A safety plan was indicated: no  If yes, describe in detail n/a    PLAN: Between sessions, Stephanie Schneider will continue to monitor her mood. Work on budgeting skills and making a monthly budget that takes into consideration her limitations for SSI. She will also continue to engage socially and participate in activities that bring her ciara. At the next session, the therapist will use Client-centered Therapy, Dialectical Behavior Therapy, Mindfulness-based Strategies, Motivational Interviewing, Solution-Focused Therapy, and Supportive Psychotherapy to address her mood regulation and relationship issues.    Behavioral Health Treatment Plan and Discharge Planning: Stephanie Schneider is aware of and agrees to continue to work on their treatment plan. They have identified and are working toward their discharge goals. yes    Depression Follow-up Plan Completed: Yes. Continue with therapy and medication management/adherence.     Visit start and stop times:    01/31/25  Start Time: 0801  Stop Time: 0859  Total Visit Time: 58 minutes        "

## 2025-02-01 DIAGNOSIS — K21.9 GASTROESOPHAGEAL REFLUX DISEASE WITHOUT ESOPHAGITIS: ICD-10-CM

## 2025-02-03 ENCOUNTER — TELEPHONE (OUTPATIENT)
Age: 35
End: 2025-02-03

## 2025-02-03 RX ORDER — RABEPRAZOLE SODIUM 20 MG/1
20 TABLET, DELAYED RELEASE ORAL 2 TIMES DAILY
Qty: 60 TABLET | Refills: 5 | Status: SHIPPED | OUTPATIENT
Start: 2025-02-03

## 2025-02-03 NOTE — PSYCH
Virtual Regular Visit    Verification of patient location:    Patient is located at Home in the following state in which I hold an active license [place]      Assessment/Plan:    Problem List Items Addressed This Visit          Behavioral Health    Generalized anxiety disorder (Chronic)    Depression, major, recurrent, moderate (HCC) - Primary (Chronic)       Goals addressed in session: Goal 1     Depression Follow-up Plan Completed: Yes    Reason for visit is   Chief Complaint   Patient presents with    Virtual Regular Visit          Encounter provider APARNA Fox      Recent Visits  Date Type Provider Dept   01/31/25 Telemedicine APARNA Fox [facility] Therapist [place]   Showing recent visits within past 7 days and meeting all other requirements  Future Appointments  No visits were found meeting these conditions.  Showing future appointments within next 150 days and meeting all other requirements       The patient was identified by name and date of birth. Cruz Schneider was informed that this is a telemedicine visit and that the visit is being conducted throughthe Epic Embedded platform. She agrees to proceed..  My office door was closed. No one else was in the room.  She acknowledged consent and understanding of privacy and security of the video platform. The patient has agreed to participate and understands they can discontinue the visit at any time.    Patient is aware this is a billable service.     Subjective  Cruz Schneider is a 34 y.o. female .      HPI     Past Medical History:   Diagnosis Date    Abnormal Pap smear of cervix     Allergic     Anxiety     Arthritis     Dental caries     Depression     Fibromyalgia, primary     GERD (gastroesophageal reflux disease)     IBS (irritable bowel syndrome)     Migraine     Obesity     Vitamin B12 deficiency        Past Surgical History:   Procedure Laterality Date    COLONOSCOPY N/A 5/8/2018    Procedure: COLONOSCOPY;  Surgeon: Stephanie  MD Alecia;  Location: [facility] GI LAB;  Service: Gastroenterology    ME EXC B9 LESION MRGN XCP SK TG S/N/H/F/G > 4.0CM N/A 7/1/2021    Procedure: EXCISION OF PILAR SCALP CYST X 2 AND RIGHT INNER GROIN NEVVUS;  Surgeon: Denis Barron MD;  Location:  MAIN OR;  Service: Plastics    ME LAPS SURG CHOLECYSTECTOMY W/CHOLANGIOGRAPHY N/A 12/26/2024    Procedure: CHOLECYSTECTOMY LAPAROSCOPIC;  Surgeon: Kulwinder Nunn MD;  Location: [facility] MAIN OR;  Service: General    ME REPAIR COMPLEX SCALP/ARM/LEG 2.6-7.5 CM N/A 7/1/2021    Procedure: CLOSURE WOUND SCALP X 2 AND RIGHT INNER GROIN;  Surgeon: Denis Barron MD;  Location:  MAIN OR;  Service: Plastics    TONSILLECTOMY      WISDOM TOOTH EXTRACTION         Current Outpatient Medications   Medication Sig Dispense Refill    acetaminophen (TYLENOL) 500 mg tablet Take 500 mg by mouth every 6 (six) hours as needed for mild pain (1-2 tablets)      ALPRAZolam (XANAX) 0.5 mg tablet Take 1 tablet (0.5 mg total) by mouth 3 (three) times a day as needed for anxiety Do not start before December 11, 2024. 90 tablet 2    ascorbic acid (VITAMIN C) 500 MG tablet Take 500 mg by mouth daily      bisacodyl (DULCOLAX) 5 mg EC tablet Take as directed by GI office (Patient not taking: Reported on 12/12/2024) 4 tablet 0    Botox 200 units SOLR       buPROPion (WELLBUTRIN XL) 300 mg 24 hr tablet Take 1 tablet (300 mg total) by mouth daily 90 tablet 0    Cholecalciferol (Vitamin D3) 50 MCG (2000 UT) capsule Take 1 capsule (2,000 Units total) by mouth daily 90 capsule 1    Cyanocobalamin (Vitamin B-12) 500 MCG SUBL Place 1 tablet (500 mcg total) under the tongue in the morning (Patient not taking: Reported on 12/12/2024) 30 tablet 2    cyanocobalamin 1,000 mcg/mL 1 IM injection every month 3 mL 3    dicyclomine (BENTYL) 10 mg capsule Take 2 capsules (20 mg total) by mouth 3 (three) times a day before meals (Patient not taking: Reported on 1/14/2025) 120 capsule 1    dihydroergotamine (MIGRANAL) 4  MG/ML nasal spray 1 spray into each nostril as needed for migraine Use in one nostril as directed.  No more than 4 sprays in one hour 8 mL 0    diphenoxylate-atropine (LOMOTIL) 2.5-0.025 mg per tablet Take 1 tablet by mouth 4 (four) times a day as needed for diarrhea 30 tablet 0    escitalopram (LEXAPRO) 20 mg tablet Take 1 tablet (20 mg total) by mouth daily 90 tablet 0    etonogestrel-ethinyl estradiol (NuvaRing) 0.12-0.015 MG/24HR vaginal ring Insert ring for 21 days then remove for one week. 3 each 4    FeroSul 325 (65 Fe) MG tablet Take 1 tablet (325 mg total) by mouth in the morning 90 tablet 1    gabapentin (NEURONTIN) 300 mg capsule Take 1 capsule (300 mg total) by mouth 3 (three) times a day 90 capsule 5    Galcanezumab-gnlm (Emgality) 120 MG/ML SOAJ Inject as directed 1 ml Subcutaneous  every 30 days 1 mL 11    indomethacin (INDOCIN) 25 mg capsule 1-2 tabs BID prn severe headache with food. Hold toradol. 30 capsule 6    loperamide (IMODIUM) 2 mg capsule Take 1 capsule (2 mg total) by mouth 4 (four) times a day as needed for diarrhea (Patient not taking: Reported on 12/12/2024) 12 capsule 0    magnesium Oxide (MAG-OX) 400 mg TABS Take 1 tablet (400 mg total) by mouth daily 30 tablet 5    methocarbamol (ROBAXIN) 500 mg tablet Take 1 tablet (500 mg total) by mouth 3 (three) times a day 270 tablet 1    mupirocin (BACTROBAN) 2 % cream Apply bid to areas needed 30 g 5    mupirocin (BACTROBAN) 2 % ointment Apply topically 3 (three) times a day 15 g 5    nystatin (MYCOSTATIN) cream Apply topically 2 (two) times a day (Patient taking differently: Apply 1 Application topically as needed) 15 g 0    OLANZapine (ZyPREXA) 7.5 mg tablet Take 1 tablet (7.5 mg total) by mouth daily at bedtime Do not take with reglan. 90 tablet 0    ondansetron (ZOFRAN) 4 mg tablet Take 1 tablet (4 mg total) by mouth every 6 (six) hours 30 tablet 2    polyethylene glycol (MIRALAX) 17 g packet Take 17 g by mouth if needed      pramipexole  (MIRAPEX) 0.25 mg tablet Take 1 tablet (0.25 mg total) by mouth daily at bedtime 30 tablet 1    prazosin (MINIPRESS) 2 mg capsule Take 1 capsule (2 mg total) by mouth daily at bedtime 90 capsule 0    RABEprazole (ACIPHEX) 20 MG tablet Take 1 tablet (20 mg total) by mouth 2 (two) times a day 60 tablet 3    Semaglutide-Weight Management (Wegovy) 0.25 MG/0.5ML Inject 0.5 mL (0.25 mg total) under the skin once a week Inject 0.25 mg under the skin weekly 2 mL 0    Trudhesa 0.725 MG/ACT AERS 1 spray in each nostril for total dose of 1.45mg, may repeat 1 dose after 1 hour. Max 2 doses per 24 hours and 3 doses per week. 12 mL 2     Current Facility-Administered Medications   Medication Dose Route Frequency Provider Last Rate Last Admin    cyanocobalamin injection 1,000 mcg  1,000 mcg Intramuscular Q30 Days Oni Beckham,    1,000 mcg at 03/14/24 1450    cyanocobalamin injection 1,000 mcg  1,000 mcg Intramuscular Q30 Days    1,000 mcg at 11/21/24 1704        Allergies   Allergen Reactions    Cimzia [Certolizumab Pegol] Swelling     Face and hands    Desvenlafaxine Other (See Comments)     Other reaction(s): state of confusion    Ixekizumab Vomiting    Seasonal Ic [Octacosanol] Nasal Congestion    Tizanidine Anxiety     Panic attacks       Review of Systems    Video Exam    There were no vitals filed for this visit.    Physical Exam     Behavioral Health Psychotherapy Progress Note    Psychotherapy Provided: Individual Psychotherapy     1. Depression, major, recurrent, moderate (HCC)        2. Generalized anxiety disorder            Goals addressed in session: Goal 1     DATA: Met with Stephanie for her scheduled individual session. She states that she has been doing ok after her surgery. She states that she feels that her abdominal pain has subsided significantly. She states that the surgeon told her that she did not have gallstones but that she did have a lot of inflammation. Stephanie states that she has been trying to motivate  "herself to get some of her crafting supplies organized so she can engage herself in activities that help her to feel more productive. Stephanie discussed spending time with her mother and with friends. She states that she is currently dog-sitting for her friends. She reports that she enjoys being able to spend some time away from her home, but that she also can feel a bit lonely when she is away for more than a couple days. Stephanie discussed recent support she offered to her friend, Jacqueline. She states that she feels that she is gaining some insight into how she can offer support without expecting anything back, in return. Stephanie continues to work on building new supports-- both in person and through on-line support forums.     During this session, this clinician used the following therapeutic modalities: Client-centered Therapy, Dialectical Behavior Therapy, Mindfulness-based Strategies, Motivational Interviewing, Solution-Focused Therapy, and Supportive Psychotherapy    Substance Abuse was not addressed during this session. If the client is diagnosed with a co-occurring substance use disorder, please indicate any changes in the frequency or amount of use: n/a. Stage of change for addressing substance use diagnoses: No substance use/Not applicable    ASSESSMENT:  Stephanie Schneider presents with a Euthymic/ normal mood.     her affect is Normal range and intensity, which is congruent, with her mood and the content of the session. The client has made progress on their goals.    Stephanie Schneider presents with a minimal risk of suicide, minimal risk of self-harm, and minimal risk of harm to others.    For any risk assessment that surpasses a \"low\" rating, a safety plan must be developed.    A safety plan was indicated: no  If yes, describe in detail n/a    PLAN: Between sessions, Stephanie Schneider will continue to monitor her moods. She will continue to set limits with her friend. She will continue to work on finding ways to " increase time spent on activities that increase her feelings of productivity. At the next session, the therapist will use Client-centered Therapy, Dialectical Behavior Therapy, Mindfulness-based Strategies, Motivational Interviewing, Solution-Focused Therapy, and Supportive Psychotherapy to address her mood regulation and relationship concerns.    Behavioral Health Treatment Plan and Discharge Planning: Stephanie Schneider is aware of and agrees to continue to work on their treatment plan. They have identified and are working toward their discharge goals. yes    Depression Follow-up Plan Completed: Yes. Continue with therapy sessions and medication management/adherence.     Visit start and stop times:    01/17/25  Start Time: 0907  Stop Time: 1001  Total Visit Time: 54 minutes

## 2025-02-03 NOTE — TELEPHONE ENCOUNTER
Incoming call from Shirlene GARCIA with [facility] calling to set up delivery for patient's Botox. Advised I will forward her request to our Botox team for follow up. Shirlene voiced clear understand.       Botox Team, would you kindly assist? Thank you!        # [phone]

## 2025-02-05 ENCOUNTER — EVALUATION (OUTPATIENT)
Dept: PHYSICAL THERAPY | Age: 35
End: 2025-02-05
Payer: COMMERCIAL

## 2025-02-05 DIAGNOSIS — M79.7 FIBROMYALGIA: Primary | ICD-10-CM

## 2025-02-05 PROCEDURE — 97110 THERAPEUTIC EXERCISES: CPT | Performed by: PHYSICAL THERAPIST

## 2025-02-05 PROCEDURE — 97162 PT EVAL MOD COMPLEX 30 MIN: CPT | Performed by: PHYSICAL THERAPIST

## 2025-02-05 NOTE — PROGRESS NOTES
PT Evaluation     Today's date: 2025  Patient name: Cruz Schneider  : 1990  MRN: [id number]  Referring provider: Cate Malave DO  Dx:   Encounter Diagnosis     ICD-10-CM    1. Fibromyalgia  M79.7                      Assessment  Impairments: abnormal gait, abnormal or restricted ROM, abnormal movement, activity intolerance, impaired physical strength, lacks appropriate home exercise program and pain with function    Assessment details: PT IE: 25  Patient reported she followed up with her new Rheumatologist and patient noted she was told that her FM is the primary symptom aggravating factor / cause.  Patient noted she has multiple region joint pain.  Patient noted she has rib and thoracic spine pain that limits her ability to wear a bra or tight fitting clothing.  Patient noted she has constant lumbar spine pain that will radiate into the left hip pain that radiates in the posterior left lower extremity into the ankle and the left hip pain is anterior > lateral.  Patient noted cold weather aggravates multiple region pain.  Patient noted left lower extremity is weaker than the right.  Patient denies unilateral lower extremity buckle and she denies falls.  Patient noted the following deficits that are limited by thoracic, lumbar spine, left lower extremity and rib pain: static standing activities like washing the dishes, stair climbing in which she has stairs in her house as well as a flight to get into the second floor home.  Patient noted she sleeps too much due to constant fatigue and tiredness.  Patient noted prolonged walking is limited by thoracic, lumbar and left hip / lower extremity symptoms.  Patient noted she has to shower in a seated position due to pain aggravation.  Patient noted she had to get gall bladder removed on 24.  Patient noted she is on weygovy which she already has lost 20#.   Patient noted transfers and self dressing going without symptom aggravation.  Understanding  of Dx/Px/POC: excellent     Prognosis: good  Prognosis details: Patient is a 34 y.o. year old female seen for outpatient PT evaluation with a Fibromyalgia [M79.7] . Patient presents to PT IE with the following problems, concerns, deficits and impairments: lumbar and thoracic spine and bilateral lower extremity pain, decreased lumbar and cervical spine range of motion, decreased bilateral upper and lower extremity mobility and strength, + special tests, + TTP, + muscle guarding, functional limitations and decreased tolerance to activity.  Patient would benefit from skilled PT services under the following PT treatment plan to address the above noted deficits: Pool and land based therapeutic exercises and activities to facilitate lumbar and cervical spine mobility and bilateral upper and lower extremity mobility and strength, modalities, manual therapy techniques, DLS and abdominal strengthening, postural reeducation and strengthening, modalities, manual therapy techniques, IASTM techniques, Kinesio taping techniques and a hep.  Thank you for the referral.     Goals  Short Term goals - 4 weeks  1.  Patient will be independent HEP.   2.  Patient will report a 25 - 50% decrease in pain complaints.  3.  Increase strength 1/2 grade.  4.  Increase ROM 5-10 degrees.    Long Term goals - 8 weeks  1.  Patient will report elimination of pain complaints.  2.  Patient will return to all recreational activities without restriction.  3.  ROM WFL.  4.  Strength 5/5.  5.  Patient will report ability to perform all self and house hold chores and activities in standing without lumbar, thoracic, left hip and lower extremity symptom aggravation or limitations.  6.  Patient will report ability to perform all house hold walking activities without lumbar, thoracic, left hip and lower extremity symptom aggravation or limitations.  7.  Patient will report ability to perform house hold stair climbing activities in without lumbar, thoracic,  left hip and lower extremity symptom aggravation or limitations.  8.  Patient will report ability to perform showering activities without lumbar, thoracic, left hip and lower extremity symptom aggravation or limitations.      Plan  Patient would benefit from: skilled physical therapy and PT eval  Planned modality interventions: low level laser therapy, manual electrical stimulation, TENS, thermotherapy: hydrocollator packs, ultrasound, unattended electrical stimulation, cryotherapy and electrical stimulation/Russian stimulation    Planned therapy interventions: IASTM, joint mobilization, kinesiology taping, manual therapy, massage, balance, balance/weight bearing training, neuromuscular re-education, postural training, body mechanics training, self care, compression, strengthening, stretching, therapeutic activities, therapeutic exercise, therapeutic training, transfer training, flexibility, functional ROM exercises, gait training, graded activity, graded exercise, graded motor, home exercise program, IADL retraining and aquatic therapy    Frequency: 1-2x week  Duration in weeks: 8  Treatment plan discussed with: patient    Subjective Evaluation    History of Present Illness  Mechanism of injury:  Patient's PMHx is remarkable for AS, Migraine, HTN, GERD, IBS, Myofascial Muscle Pain, Restless leg syndrome, gall bladder removed, Vitamin B 12 and D Deficiency.  Patient Goals  Patient goals for therapy: decreased pain, increased motion, increased strength, independence with ADLs/IADLs and return to sport/leisure activities  Patient goal: patient's goal is to resume fitness walking, go to a community pool and wear a bra and tight tops without pain aggravation.  Pain  At best pain rating: 3  At worst pain ratin  Location: lumbar, thoracic, left hip and lower extremity      Objective     Tenderness     Additional Tenderness Details  Patient is + TTP at left thoracic spine at minimal to moderate and bilateral lumbar and  thoracic spine muscle guarding at severe levels.    Active Range of Motion   Cervical/Thoracic Spine       Cervical    Flexion: 20 degrees   Extension: 20 degrees      Left lateral flexion: 14 degrees      Right lateral flexion: 15 degrees      Left rotation: 44 degrees  Right rotation: 45 degrees     Left Shoulder   Flexion: 134 degrees   Abduction: 158 degrees     Right Shoulder   Flexion: 140 degrees   Abduction: 154 degrees     Lumbar   Flexion: 78 degrees   Extension: 20 degrees  with pain  Left lateral flexion: 20 degrees     Right lateral flexion: 22 degrees   Left rotation: 45 degrees   Right rotation: 46 degrees   Left Hip   Flexion: 78 degrees with pain  Abduction: 16 degrees with pain    Right Hip   Flexion: 90 degrees   Abduction: 34 degrees   Left Knee   Flexion: 102 degrees with pain  Extension: 10 degrees with pain  Extensor la degrees     Right Knee   Flexion: 116 degrees   Extension: -2 degrees   Extensor la degrees   Left Ankle/Foot   Dorsiflexion (ke): 12 degrees   Plantar flexion: 48 degrees     Right Ankle/Foot   Dorsiflexion (ke): 16 degrees   Plantar flexion: 36 degrees     Additional Active Range of Motion Details  Hamstring mobility on right at 28 degrees and left at 32 degrees.    Strength/Myotome Testing     Left Shoulder     Planes of Motion   Flexion: 4   Abduction: 4-   External rotation at 0°: 4-   Internal rotation at 0°: 4     Right Shoulder     Planes of Motion   Flexion: 4   Abduction: 4-   External rotation at 0°: 4-   Internal rotation at 0°: 4     Left Elbow   Flexion: 4  Extension: 4-    Right Elbow   Flexion: 4  Extension: 4-    Left Hip   Planes of Motion   Flexion: 3+  Extension: 4-  Abduction: 4-  Adduction: 4-    Right Hip   Planes of Motion   Flexion: 4-  Extension: 4-  Abduction: 4-  Adduction: 4    Left Knee   Flexion: 4-  Extension: 4    Right Knee   Flexion: 4-  Extension: 4    Left Ankle/Foot   Dorsiflexion: 4  Plantar flexion: 5    Right Ankle/Foot    Dorsiflexion: 4+  Plantar flexion: 5    Tests     Lumbar     Left   Positive passive SLR and slump test.     Right   Negative passive SLR and slump test.     Left Pelvic Girdle/Sacrum   Positive: active SLR test.     Right Pelvic Girdle/Sacrum   Negative: active SLR test.            Precautions:  Patient's PMHx is remarkable for AS, Migraine, HTN, GERD, IBS, Myofascial Muscle Pain, Restless leg syndrome, Vitamin B 12 and D Deficiency.      Manuals 2/5                                                                Neuro Re-Ed                                                                                                        Ther Ex                          Water walking             Standing lumbar spine extension hep                         Seated hamstring stretch:B:             LAQ:B:             Seated hip flexion:B:             Seated QL stretch:B:             UT stretch:B:             LS stretch:B:             Cervical retraction             Seated postural correction                          Standing abdominal strengthening             Scapular squeezes             Standing hr and tr:B:             Standing slr x 3:B:             Standing hamstring curls:B:             Mini squats             Lunges:B:             SLS and tandem stance:B:             Forward step ups:B:             Side stepping and tandem ambulation             Shoulder scaption and flexion:B:             Shoulder paddles rows, horizontal adduction and abduction shoulder abduction and adduction:B:                          Pool pony bicycle             Pool pony hang                                                                                                                     Ther Activity                                       Gait Training                                       Modalities

## 2025-02-06 ENCOUNTER — PATIENT MESSAGE (OUTPATIENT)
Age: 35
End: 2025-02-06

## 2025-02-06 DIAGNOSIS — E66.01 MORBID OBESITY WITH BMI OF 40.0-44.9, ADULT (HCC): ICD-10-CM

## 2025-02-07 ENCOUNTER — TELEPHONE (OUTPATIENT)
Dept: NEUROLOGY | Facility: CLINIC | Age: 35
End: 2025-02-07

## 2025-02-07 RX ORDER — SEMAGLUTIDE 0.25 MG/.5ML
0.25 INJECTION, SOLUTION SUBCUTANEOUS WEEKLY
Qty: 2 ML | Refills: 0 | Status: SHIPPED | OUTPATIENT
Start: 2025-02-07

## 2025-02-07 NOTE — TELEPHONE ENCOUNTER
Botox number of units: 200  Botox quantity: 1 box  Arrived at what location: [facility]  Botox at Correct Administering Location: yes  NDC number: 4775-9532-70  Lot number: [id number]  Expiration Date: 04/2027  Appt notes indicate correct medication: yes

## 2025-02-10 ENCOUNTER — APPOINTMENT (OUTPATIENT)
Dept: PHYSICAL THERAPY | Age: 35
End: 2025-02-10
Payer: COMMERCIAL

## 2025-02-10 ENCOUNTER — TELEMEDICINE (OUTPATIENT)
Dept: PSYCHIATRY | Facility: CLINIC | Age: 35
End: 2025-02-10
Payer: COMMERCIAL

## 2025-02-10 DIAGNOSIS — F41.1 GENERALIZED ANXIETY DISORDER: Chronic | ICD-10-CM

## 2025-02-10 DIAGNOSIS — G43.709 CHRONIC MIGRAINE WITHOUT AURA WITHOUT STATUS MIGRAINOSUS, NOT INTRACTABLE: ICD-10-CM

## 2025-02-10 DIAGNOSIS — Z79.899 LONG TERM CURRENT USE OF ANTIPSYCHOTIC MEDICATION: Primary | ICD-10-CM

## 2025-02-10 PROCEDURE — 99213 OFFICE O/P EST LOW 20 MIN: CPT | Performed by: PSYCHIATRY & NEUROLOGY

## 2025-02-10 RX ORDER — ESCITALOPRAM OXALATE 20 MG/1
20 TABLET ORAL DAILY
Qty: 90 TABLET | Refills: 0 | Status: SHIPPED | OUTPATIENT
Start: 2025-02-10

## 2025-02-10 RX ORDER — BUPROPION HYDROCHLORIDE 300 MG/1
300 TABLET ORAL DAILY
Qty: 90 TABLET | Refills: 0 | Status: SHIPPED | OUTPATIENT
Start: 2025-02-10

## 2025-02-10 RX ORDER — OLANZAPINE 7.5 MG/1
7.5 TABLET, FILM COATED ORAL
Qty: 90 TABLET | Refills: 0 | Status: SHIPPED | OUTPATIENT
Start: 2025-02-10

## 2025-02-10 NOTE — ASSESSMENT & PLAN NOTE
Orders:    escitalopram (LEXAPRO) 20 mg tablet; Take 1 tablet (20 mg total) by mouth daily    buPROPion (WELLBUTRIN XL) 300 mg 24 hr tablet; Take 1 tablet (300 mg total) by mouth daily

## 2025-02-10 NOTE — PSYCH
Virtual Regular Visit    Verification of patient location:    Patient is located at Home in the following state in which I hold an active license [place]      Assessment/Plan:    Problem List Items Addressed This Visit    None                      Reason for visit is   Medication Management     Encounter provider Aliyah Keating MD    Provider located at [facility]  [address]  [place] [address] [phone]      Recent Visits  No visits were found meeting these conditions.  Showing recent visits within past 7 days and meeting all other requirements  Future Appointments  No visits were found meeting these conditions.  Showing future appointments within next 150 days and meeting all other requirements       The patient was identified by name and date of birth. Cruz Schneider was informed that this is a telemedicine visit and that the visit is being conducted throughthe Epic Embedded platform. She agrees to proceed..  My office door was closed. No one else was in the room.  She acknowledged consent and understanding of privacy and security of the video platform. The patient has agreed to participate and understands they can discontinue the visit at any time.    Patient is aware this is a billable service.     Subjective  Cruz Schneider is a 34 y.o. female with MDD,Panic do, BESSY .Stephanie remains compliant with her medications and denies side effects. No recent health changes or new medications.  She continues to meet with her counselor on a regular basis.      Patient stated she has been dealing with a lot of physical pain due to ankylosing spondylitis and plantar fasciitis.   She is also taking medical cannabis which helps with her anxiety and also with her chronic pain.  Recently  her anxiety has been high and had Alprazolam increased to 0.5 mg po tid temporarily.   She has benefited from dose increase but still struggles with  depressed mood.    She had  dose increase on Olanzapine to 7.5 mg po qhs. Lipids and A1C ordered and still pending.  She feels better after dose increase.  She  had trial of Phentermine for weight loss but experienced side effcts. She started trial of Wegovy instead.    Since last December she had cholecystectomy and her mother had hysterectomy and they are both recovering well.  She feels her mood stable.        Agrees with above stated treatment and will schedule follow up in 3 months or sooner of needed.       Allergies   Allergen Reactions    Cimzia [Certolizumab Pegol] Swelling     Face and hands    Desvenlafaxine Other (See Comments)     Other reaction(s): state of confusion    Ixekizumab Vomiting    Seasonal Ic [Octacosanol] Nasal Congestion    Tizanidine Anxiety     Panic attacks       Review of Systems    Mood Anxiety and Depression   Behavior Normal    Thought Content Disturbing Thoughts, Feelings   General Emotional Problems and Decreased Functioning   Personality Normal   Other Psych Symptoms Normal   Constitutional Negative   ENT Negative   Cardiovascular Negative   Respiratory Negative   Gastrointestinal Negative   Genitourinary Negative   Musculoskeletal Negative   Integumentary Negative   Neurological Negative   Endocrine Normal    Other Symptoms Normal        Laboratory Results:   Recent Labs (last 12 months):   Procedure visit on 01/07/2025   Component Date Value    Case Report 01/07/2025                      Value:Surgical Pathology Report                         Case: [id number]                                  Authorizing Provider:  Oni Beckham DO           Collected:           01/07/2025 1044              Ordering Location:     [facility]      Received:            01/07/2025 1044                                     Primary Care                                                                 Pathologist:           Ulices Holcomb MD                                                "       Specimens:   A) - Eye, Left, next to eye lateral                                                                 B) - Eye, Left, next to eye - medial                                                       Final Diagnosis 01/07/2025                      Value:A. Skin, next to left eye lateral:  VERRUCA VULGARIS       B. Skin, next to left eye medial:   VERRUCA VULGARIS          Note 01/07/2025                      Value:Interpretation performed at [facility] [address] [facility], [place] [address]        Additional Information 01/07/2025                      Value:All reported additional testing was performed with appropriately reactive controls.  These tests were developed and their performance characteristics determined by [facility] or appropriate performing facility, though some tests may be performed on tissues which have not been validated for performance characteristics (such as staining performed on alcohol exposed cell blocks and decalcified tissues).  Results should be interpreted with caution and in the context of the patients’ clinical condition. These tests may not be cleared or approved by the U.S. Food and Drug Administration, though the FDA has determined that such clearance or approval is not necessary. These tests are used for clinical purposes and they should not be regarded as investigational or for research. This laboratory has been approved by CLIA 88, designated as a high-complexity laboratory and is qualified to perform these tests.  .      Gross Description 01/07/2025                      Value:A. The specimen is received in formalin, labeled with the patient's name and hospital number, and is designated \" eye left, next to eye lateral\".  The specimen consists of a tan portion of skin measuring 0.1 x 0.1 by less than 0.1 cm.  1 surface is inked red and the opposite surface is inked green.  Entirely submitted. One cassette.  Between sponges.  B. The specimen " "is received in formalin, labeled with the patient's name and hospital number, and is designated \" eye, left, next to eye-medial\".  The specimen consists of a tan-white portion of skin measuring 0.2 x 0.1 by less than 0.1 cm.  1 surface is inked red which upon manipulation is split into 2 pieces and entirely submitted between sponges in 1 cassette.    Note: The estimated total formalin fixation time based upon information provided by the submitting clinician and the standard processing schedule is under 72 hours. Raysa        Case Report 01/07/2025                      Value:Surgical Pathology Report                         Case: [id number]                                  Authorizing Provider:  Oni Beckham DO           Collected:           01/07/2025 1044              Ordering Location:     [facility]      Received:            01/07/2025 1044                                     Primary Care                                                                 Pathologist:           Ulices Holcomb MD                                                      Specimens:   A) - Eye, Left, next to eye lateral                                                                 B) - Eye, Left, next to eye - medial                                                       Final Diagnosis 01/07/2025                      Value:A. Skin, next to left eye lateral:  VERRUCA VULGARIS       B. Skin, next to left eye medial:   VERRUCA VULGARIS          Note 01/07/2025                      Value:Interpretation performed at [facility] [address] [place] [id number]        Additional Information 01/07/2025                      Value:All reported additional testing was performed with appropriately reactive controls.  These tests were developed and their performance characteristics determined by [facility] or appropriate performing facility, though some tests may be performed on tissues which " "have not been validated for performance characteristics (such as staining performed on alcohol exposed cell blocks and decalcified tissues).  Results should be interpreted with caution and in the context of the patients’ clinical condition. These tests may not be cleared or approved by the U.S. Food and Drug Administration, though the FDA has determined that such clearance or approval is not necessary. These tests are used for clinical purposes and they should not be regarded as investigational or for research. This laboratory has been approved by CLIA 88, designated as a high-complexity laboratory and is qualified to perform these tests.  .      Gross Description 01/07/2025                      Value:A. The specimen is received in formalin, labeled with the patient's name and hospital number, and is designated \" eye left, next to eye lateral\".  The specimen consists of a tan portion of skin measuring 0.1 x 0.1 by less than 0.1 cm.  1 surface is inked red and the opposite surface is inked green.  Entirely submitted. One cassette.  Between sponges.  B. The specimen is received in formalin, labeled with the patient's name and hospital number, and is designated \" eye, left, next to eye-medial\".  The specimen consists of a tan-white portion of skin measuring 0.2 x 0.1 by less than 0.1 cm.  1 surface is inked red which upon manipulation is split into 2 pieces and entirely submitted between sponges in 1 cassette.    Note: The estimated total formalin fixation time based upon information provided by the submitting clinician and the standard processing schedule is under 72 hours. [facility]       Admission on 12/26/2024, Discharged on 12/26/2024   Component Date Value    EXT Preg Test, Ur 12/26/2024 Negative     Control 12/26/2024 Valid     Case Report 12/26/2024                      Value:Surgical Pathology Report                         Case: [id number]                                  Authorizing Provider:  Kulwinder" "MD Edouard       Collected:           12/26/2024 0814              Ordering Location:     [facility] Received:            12/26/2024 1009                                     Operating Room                                                               Pathologist:           Marcelo Elise MD                                                                 Specimen:    Gallbladder                                                                                Final Diagnosis 12/26/2024                      Value:A. Gallbladder, cholecystectomy:  - Chronic cholecystitis, cholesterolosis.  - One benign lymph node (0/1).        Note 12/26/2024                      Value:Interpretation performed at [facility], [address] [facility] [place] [address]        Additional Information 12/26/2024                      Value:All reported additional testing was performed with appropriately reactive controls.  These tests were developed and their performance characteristics determined by [facility] or appropriate performing facility, though some tests may be performed on tissues which have not been validated for performance characteristics (such as staining performed on alcohol exposed cell blocks and decalcified tissues).  Results should be interpreted with caution and in the context of the patients’ clinical condition. These tests may not be cleared or approved by the U.S. Food and Drug Administration, though the FDA has determined that such clearance or approval is not necessary. These tests are used for clinical purposes and they should not be regarded as investigational or for research. This laboratory has been approved by CLIA 88, designated as a high-complexity laboratory and is qualified to perform these tests.  .      Gross Description 12/26/2024                      Value:A. The specimen is received in formalin, labeled with the patient's name and hospital number, and is designated \" " "gallbladder.\"  The specimen consists of a grossly intact purple to tan gallbladder that is 5.9 x 2.6 x 2.6 cm.  The surface has areas of attached yellow fat in brown to tan fibrous tissue at the area previous resection.  The staple cystic duct margin is inked blue, and submitted with sections.  The gallbladder is opened revealing dark green viscous bile with no stones.  The wall thickness is up to 0.2 cm.  Mucosa is dark green and velvety with gold flecking.  There is a single tan rubbery density near the cystic duct margin measuring up to 0.8 cm.  Representative sections including the density are submitted in 1 cassette.  Note: The estimated total formalin fixation time based upon information provided by the submitting clinician and the standard processing schedule is under 72 hours.  TStevens     Annual Exam on 11/19/2024   Component Date Value    Case Report 11/19/2024                      Value:Gynecologic Cytology Report                       Case: [id number]                                  Authorizing Provider:  Ignacia López MD  Collected:           11/19/2024 [facility]              Ordering Location:     [facility]  Received:            11/19/2024 [address] [place] [address]                                                           First Screen:          Laureen Hernandez, CT                                                    Specimen:    LIQUID-BASED PAP, SCREENING, Cervix, Endocervical                                          Primary Interpretation 11/19/2024 Negative for intraepithelial lesion or malignancy     Specimen Adequacy 11/19/2024 Satisfactory for evaluation. Endocervical/transformation zone component present.     Note 11/19/2024                      Value:Screening performed at [facility], [address] [facility] [address].        Additional Information 11/19/2024                      Value:[employer]'s FDA approved ,  " "and ThinPrep Imaging Duo System are utilized with strict adherence to the 's instruction manual to prepare gynecologic and non-gynecologic cytology specimens for the production of ThinPrep slides as well as for gynecologic ThinPrep imaging. These processes have been validated by our laboratory and/or by the .  The Pap test is not a diagnostic procedure and should not be used as the sole means to detect cervical cancer. It is only a screening procedure to aid in the detection of cervical cancer and its precursors. Both false-negative and false-positive results have been experienced. Your patient's test result should be interpreted in this context together with the history and clinical findings.      Gross Description 11/19/2024                      Value:A. 20 ml , colorless, cloudy received in a ThinPrep vial.      HPV Other HR 11/19/2024 Negative     HPV16 11/19/2024 Negative     HPV18 11/19/2024 Negative    Hospital Outpatient Visit on 11/01/2024   Component Date Value    EXT Preg Test, Ur 11/01/2024 Negative     Control 11/01/2024 Valid     Case Report 11/01/2024                      Value:Surgical Pathology Report                         Case: [id number]                                  Authorizing Provider:  Benitez Whitney DO              Collected:           11/01/2024 1046              Ordering Location:     [facility] Received:            11/01/2024 1130                                     Endoscopy                                                                    Pathologist:           Filemon Johnson MD                                                           Specimens:   A) - Terminal Ileum, bx r/o ibd                                                                     B) - Colon, random bx r/o ibd                                                              Final Diagnosis 11/01/2024                      Value:A. Small intestine, \"Terminal ileum rule out IBD,\" " "Biopsy:  - Fragments of small intestinal mucosa with intact villous architecture and no increase in intraepithelial lymphocytes  - Negative for chronic or active ileitis, dysplasia or malignancy    B. Colon, \"Random colon biopsy rule out IBD,\" Biopsy:  - Benign colonic mucosa with intact glandular architecture  - Negative for lymphocytic, collagenous, or active colitis  - Negative for acute colitis  - Negative for granulomas, dysplasia, or carcinoma        Additional Information 11/01/2024                      Value:All reported additional testing was performed with appropriately reactive controls.  These tests were developed and their performance characteristics determined by [facility] or appropriate performing facility, though some tests may be performed on tissues which have not been validated for performance characteristics (such as staining performed on alcohol exposed cell blocks and decalcified tissues).  Results should be interpreted with caution and in the context of the patients’ clinical condition. These tests may not be cleared or approved by the U.S. Food and Drug Administration, though the FDA has determined that such clearance or approval is not necessary. These tests are used for clinical purposes and they should not be regarded as investigational or for research. This laboratory has been approved by CLIA 88, designated as a high-complexity laboratory and is qualified to perform these tests.  .Interpretation performed at [facility] [address]        Gross Description 11/01/2024                      Value:A. The specimen is received in formalin, labeled with the patient's name and hospital number, and is designated \" terminal ileum rule out IBD\".  The specimen consists of 3 pale-tan soft tissue fragments measuring 0.3, 0.4 and 0.5 cm.  Entirely submitted. Screened cassette.  B. The specimen is received in formalin, labeled " "with the patient's name and hospital number, and is designated \" random colon biopsy rule out IBD\".  The specimen consists of multiple tan soft tissue fragments measuring in aggregate 0.6 x 0.6 x 0.1 cm.  Entirely submitted. Screened cassette.    Note: The estimated total formalin fixation time based upon information provided by the submitting clinician and the standard processing schedule is over 72 hours.    [facility]      Clinical Information 11/01/2024                      Value:FINDINGS:  · 2 cm sliding hiatal hernia (type I hiatal hernia) without Flako lesions present - GE junction 35 cm from the incisors, diaphragmatic impression 37 cm from the incisors, confirmed by retroflexion:  Hill classification: Grade I  · The upper third of the esophagus, middle third of the esophagus and lower third of the esophagus appeared normal. Z-line is 35 cm from the incisors.  · The fundus of the stomach, body of the stomach, greater curve of the stomach, lesser curve of the stomach, incisura, antrum, prepyloric region and pylorus appeared normal. Previous biopsies negative.  Repeat biopsies not performed.  · The duodenal bulb, 1st part of the duodenum, 2nd part of the duodenum and major papilla appeared normal. Previous biopsies negative.  Repeat biopsies not performed.    FINDINGS:  · The terminal ileum and entire colon appeared normal.  · Performed multiple forceps biopsies in the terminal ileum to rule out IBD  · Performed multiple pancolonic forceps biopsies to rule                           out IBD     Admission on 10/13/2024, Discharged on 10/13/2024   Component Date Value    WBC 10/13/2024 10.75 (H)     RBC 10/13/2024 4.88     Hemoglobin 10/13/2024 13.7     Hematocrit 10/13/2024 41.8     MCV 10/13/2024 86     MCH 10/13/2024 28.1     MCHC 10/13/2024 32.8     RDW 10/13/2024 13.5     MPV 10/13/2024 8.7 (L)     Platelets 10/13/2024 494 (H)     nRBC 10/13/2024 0     Segmented % 10/13/2024 73     Immature Grans % 10/13/2024 " 0     Lymphocytes % 10/13/2024 18     Monocytes % 10/13/2024 7     Eosinophils Relative 10/13/2024 1     Basophils Relative 10/13/2024 1     Absolute Neutrophils 10/13/2024 7.90 (H)     Absolute Immature Grans 10/13/2024 0.02     Absolute Lymphocytes 10/13/2024 1.95     Absolute Monocytes 10/13/2024 0.72     Eosinophils Absolute 10/13/2024 0.10     Basophils Absolute 10/13/2024 0.06     Sodium 10/13/2024 134 (L)     Potassium 10/13/2024 3.7     Chloride 10/13/2024 103     CO2 10/13/2024 22     ANION GAP 10/13/2024 9     BUN 10/13/2024 7     Creatinine 10/13/2024 0.73     Glucose 10/13/2024 98     Calcium 10/13/2024 9.4     AST 10/13/2024 15     ALT 10/13/2024 15     Alkaline Phosphatase 10/13/2024 66     Total Protein 10/13/2024 7.7     Albumin 10/13/2024 4.0     Total Bilirubin 10/13/2024 0.60     eGFR 10/13/2024 108     Lipase 10/13/2024 27     SARS COV Rapid Antigen 10/13/2024 Negative     Influenza A Rapid Antigen 10/13/2024 Negative     Influenza B Rapid Antigen 10/13/2024 Negative     Color, UA 10/13/2024 Yellow     Clarity, UA 10/13/2024 Clear     Specific Gravity, UA 10/13/2024 1.025     pH, UA 10/13/2024 6.0     Leukocytes, UA 10/13/2024 Negative     Nitrite, UA 10/13/2024 Negative     Protein, UA 10/13/2024 Negative     Glucose, UA 10/13/2024 Negative     Ketones, UA 10/13/2024 15 (1+) (A)     Urobilinogen, UA 10/13/2024 0.2     Bilirubin, UA 10/13/2024 Negative     Occult Blood, UA 10/13/2024 Negative     Urine Culture 10/13/2024 10,000-19,000 cfu/ml    Admission on 10/10/2024, Discharged on 10/10/2024   Component Date Value    Color, UA 10/10/2024 Yellow     Clarity, UA 10/10/2024 Clear     Specific Gravity, UA 10/10/2024 1.020     pH, UA 10/10/2024 6.5     Leukocytes, UA 10/10/2024 1+ (A)     Nitrite, UA 10/10/2024 Negative     Protein, UA 10/10/2024 Negative     Glucose, UA 10/10/2024 Negative     Ketones, UA 10/10/2024 Negative     Urobilinogen, UA 10/10/2024 0.2     Bilirubin, UA 10/10/2024 Negative      Occult Blood, UA 10/10/2024 Negative     EXT Preg Test, Ur 10/10/2024 Negative     Control 10/10/2024 Valid     WBC 10/10/2024 8.30     RBC 10/10/2024 4.21     Hemoglobin 10/10/2024 11.8     Hematocrit 10/10/2024 36.4     MCV 10/10/2024 87     MCH 10/10/2024 28.0     MCHC 10/10/2024 32.4     RDW 10/10/2024 13.6     MPV 10/10/2024 8.9     Platelets 10/10/2024 447 (H)     nRBC 10/10/2024 0     Segmented % 10/10/2024 47     Immature Grans % 10/10/2024 0     Lymphocytes % 10/10/2024 41     Monocytes % 10/10/2024 8     Eosinophils Relative 10/10/2024 4     Basophils Relative 10/10/2024 0     Absolute Neutrophils 10/10/2024 3.85     Absolute Immature Grans 10/10/2024 0.01     Absolute Lymphocytes 10/10/2024 3.42     Absolute Monocytes 10/10/2024 0.70     Eosinophils Absolute 10/10/2024 0.29     Basophils Absolute 10/10/2024 0.03     Sodium 10/10/2024 136     Potassium 10/10/2024 3.6     Chloride 10/10/2024 106     CO2 10/10/2024 20 (L)     ANION GAP 10/10/2024 10     BUN 10/10/2024 9     Creatinine 10/10/2024 0.53 (L)     Glucose 10/10/2024 103     Calcium 10/10/2024 8.9     AST 10/10/2024 10 (L)     ALT 10/10/2024 13     Alkaline Phosphatase 10/10/2024 56     Total Protein 10/10/2024 6.6     Albumin 10/10/2024 3.6     Total Bilirubin 10/10/2024 0.31     eGFR 10/10/2024 125     Magnesium 10/10/2024 1.9     Lipase 10/10/2024 34     RBC, UA 10/10/2024 1-2     WBC, UA 10/10/2024 10-20 (A)     Epithelial Cells 10/10/2024 Moderate (A)     Bacteria, UA 10/10/2024 Occasional     Urine Culture 10/10/2024 >100,000 cfu/ml Beta Hemolytic Streptococcus Group B (A)    Appointment on 09/27/2024   Component Date Value    CRP 09/27/2024 14.1 (H)    Admission on 07/07/2024, Discharged on 07/07/2024   Component Date Value    WBC 07/07/2024 7.49     RBC 07/07/2024 4.28     Hemoglobin 07/07/2024 12.3     Hematocrit 07/07/2024 37.7     MCV 07/07/2024 88     MCH 07/07/2024 28.7     MCHC 07/07/2024 32.6     RDW 07/07/2024 12.8     MPV  07/07/2024 8.9     Platelets 07/07/2024 450 (H)     nRBC 07/07/2024 0     Segmented % 07/07/2024 51     Immature Grans % 07/07/2024 0     Lymphocytes % 07/07/2024 38     Monocytes % 07/07/2024 8     Eosinophils Relative 07/07/2024 3     Basophils Relative 07/07/2024 0     Absolute Neutrophils 07/07/2024 3.83     Absolute Immature Grans 07/07/2024 0.01     Absolute Lymphocytes 07/07/2024 2.85     Absolute Monocytes 07/07/2024 0.58     Eosinophils Absolute 07/07/2024 0.19     Basophils Absolute 07/07/2024 0.03     Sodium 07/07/2024 136     Potassium 07/07/2024 3.7     Chloride 07/07/2024 104     CO2 07/07/2024 22     ANION GAP 07/07/2024 10     BUN 07/07/2024 5     Creatinine 07/07/2024 0.64     Glucose 07/07/2024 88     Calcium 07/07/2024 9.1     AST 07/07/2024 23     ALT 07/07/2024 18     Alkaline Phosphatase 07/07/2024 59     Total Protein 07/07/2024 7.1     Albumin 07/07/2024 3.7     Total Bilirubin 07/07/2024 0.46     eGFR 07/07/2024 117     Lipase 07/07/2024 16     EXT Preg Test, Ur 07/07/2024 Negative     Control 07/07/2024 Valid     Color, UA 07/07/2024 Yellow     Clarity, UA 07/07/2024 Clear     Specific Gravity, UA 07/07/2024 1.025     pH, UA 07/07/2024 6.5     Leukocytes, UA 07/07/2024 Negative     Nitrite, UA 07/07/2024 Negative     Protein, UA 07/07/2024 Negative     Glucose, UA 07/07/2024 Negative     Ketones, UA 07/07/2024 Trace (A)     Urobilinogen, UA 07/07/2024 0.2     Bilirubin, UA 07/07/2024 Negative     Occult Blood, UA 07/07/2024 Negative    Appointment on 05/29/2024   Component Date Value    RODRIGUEZ SYNDROME DNA FRANCISCO J* 05/29/2024 Not Detected     HEREDITARY BREAST & OVAR* 05/29/2024 Not Detected     FAMILIAL HYPERCHOLESTERO* 05/29/2024 Not Detected    Office Visit on 03/01/2024   Component Date Value    Color, UA 03/01/2024 Yellow     Clarity, UA 03/01/2024 Clear     Specific Gravity, UA 03/01/2024 1.020     pH, UA 03/01/2024 6.5     Leukocytes, UA 03/01/2024 Moderate (A)     Nitrite, UA  03/01/2024 Negative     Protein, UA 03/01/2024 Trace (A)     Glucose, UA 03/01/2024 Negative     Ketones, UA 03/01/2024 Negative     Urobilinogen, UA 03/01/2024 <2.0     Bilirubin, UA 03/01/2024 Negative     Occult Blood, UA 03/01/2024 Negative     Urine Culture 03/01/2024 10,000-19,000 cfu/ml     RBC, UA 03/01/2024 1-2     WBC, UA 03/01/2024 10-20 (A)     Epithelial Cells 03/01/2024 Moderate (A)     Bacteria, UA 03/01/2024 None Seen     MUCUS THREADS 03/01/2024 Innumerable (A)    There may be more visits with results that are not included.     Substance Abuse History:  Social History     Substance and Sexual Activity   Drug Use Yes    Types: Marijuana    Comment: medical marijuana -smokes flower       Family Psychiatric History:   Family History   Problem Relation Age of Onset    Rheum arthritis Mother     Psoriasis Mother     Other Mother     Hypertension Mother     Diabetes unspecified Mother     Sjogren's syndrome Mother     Alcohol abuse Mother     Drug abuse Mother     Anxiety disorder Father     Alcohol abuse Father     Lung cancer Maternal Grandfather     Cancer Other         bone    Diabetes Other     Other Other         High blood pressure    Depression Maternal Grandmother        The following portions of the patient's history were reviewed and updated as appropriate: allergies, current medications, past family history, past medical history, past social history, past surgical history and problem list.    Social History     Socioeconomic History    Marital status: Single     Spouse name: Not on file    Number of children: 0    Years of education: 12    Highest education level: Not on file   Occupational History    Occupation: Unemployed     Employer: [email or web address]   Tobacco Use    Smoking status: Never     Passive exposure: Past    Smokeless tobacco: Never   Vaping Use    Vaping status: Never Used   Substance and Sexual Activity    Alcohol use: Not Currently     Comment: rarely    Drug use: Yes     Types:  Marijuana     Comment: medical marijuana -smokes flower    Sexual activity: Not Currently     Partners: Male     Birth control/protection: Ring     Comment: Nuva ring   Other Topics Concern    Not on file   Social History Narrative    Home:  Living with mom and MGM        Education:    Pt denies any h/o learning disability Dxs but admits to having great difficulty in math requiring a .  She reached childhood milestones on time as far as he knows.    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 On reflection, she feels it was moreso her anxiety as the cause of her leaving school, and she was using the other reason as an excuse so she would not have to admit to anxiety at that time.  She is now very open about her anxiety and does not mind that I have this information in the general social section of her chart.     Social Drivers of Health     Financial Resource Strain: Low Risk  (8/16/2022)    Overall Financial Resource Strain (CARDIA)     Difficulty of Paying Living Expenses: Not hard at all   Food Insecurity: No Food Insecurity (8/16/2022)    Hunger Vital Sign     Worried About Running Out of Food in the Last Year: Never true     Ran Out of Food in the Last Year: Never true   Transportation Needs: No Transportation Needs (8/16/2022)    PRAPARE - Transportation     Lack of Transportation (Medical): No     Lack of Transportation (Non-Medical): No   Physical Activity: Unknown (2/11/2021)    Exercise Vital Sign     Days of Exercise per Week: 0 days     Minutes of Exercise per Session: Not on file   Stress: Not on file   Social Connections: Not on file   Intimate Partner Violence: Not on file   Housing Stability: Not on file     Social History     Social History Narrative    Home:  Living with mom and MGM         Education:    Pt denies any h/o learning disability Dxs but admits to having great difficulty in math requiring a .  She reached childhood milestones on time as far as he knows.    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 On reflection, she feels it was moreso her anxiety as the cause of her leaving school, and she was using the other reason as an excuse so she would not have to admit to anxiety at that time.  She is now very open about her anxiety and does not mind that I have this information in the general social section of her chart.       Objective:       Mental status:  Appearance calm and cooperative , adequate hygiene and grooming and good eye contact    Mood dysphoric   Affect affect was constricted   Speech a normal rate and fluent   Thought Processes coherent/organized and normal thought processes   Hallucinations no hallucinations present    Thought Content no delusions   Abnormal Thoughts no suicidal thoughts  and no homicidal thoughts    Orientation  oriented to person and place and time   Remote Memory short term memory intact and long term memory intact   Attention Span concentration intact   Intellect Appears to be of Average Intelligence   Insight Limited insight   Judgement judgment was limited   Muscle Strength n/a   Language no difficulty naming common objects and no difficulty repeating a phrase    Fund of Knowledge displays adequate knowledge of current events, adequate fund of knowledge regarding past history and adequate fund of knowledge regarding vocabulary                Assessment/Plan:       There are no diagnoses linked to this encounter.      Assessment & Plan  Generalized anxiety disorder    Orders:    escitalopram (LEXAPRO) 20 mg tablet; Take 1 tablet (20 mg  total) by mouth daily    buPROPion (WELLBUTRIN XL) 300 mg 24 hr tablet; Take 1 tablet (300 mg total) by mouth daily    Chronic migraine without aura without status migrainosus, not intractable    Orders:    OLANZapine (ZyPREXA) 7.5 mg tablet; Take 1 tablet (7.5 mg total) by mouth daily at bedtime Do not take with reglan.                     Treatment Recommendations- Risks Benefits      Immediate Medical/Psychiatric/Psychotherapy Treatments and Any Precautions: as stated on HPI     Risks, Benefits And Possible Side Effects Of Medications:  {PSYCH RISK, BENEFITS AND POSSIBLE SIDE EFFECTS (Optional):10838    Controlled Medication Discussion: Discussed with patient Black Box warning on concurrent use of benzodiazepines and opioid medications including sedation, respiratory depression, coma and death. Patient understands the risk of treatment with benzodiazepines in addition to opioids and wants to continue taking those medications. , Discussed with patient the risks of sedation, respiratory depression, impairment of ability to drive and potential for abuse and addiction related to treatment with benzodiazepine medications. The patient understands risk of treatment with benzodiazepine medications, agrees to not drive if feels impaired and agrees to take medications as prescribed. and The patient has been filling controlled prescriptions on time as prescribed to [place] Prescription Drug Monitoring program.      Psychotherapy Provided:    No            Visit Time    Visit Start Time: 10:30  Visit Stop Time: 10:50  Total Visit Duration:  20 minutes

## 2025-02-11 ENCOUNTER — PROCEDURE VISIT (OUTPATIENT)
Dept: NEUROLOGY | Facility: CLINIC | Age: 35
End: 2025-02-11
Payer: COMMERCIAL

## 2025-02-11 ENCOUNTER — TELEPHONE (OUTPATIENT)
Dept: PSYCHIATRY | Facility: CLINIC | Age: 35
End: 2025-02-11

## 2025-02-11 VITALS — HEART RATE: 85 BPM | DIASTOLIC BLOOD PRESSURE: 76 MMHG | TEMPERATURE: 97.4 F | SYSTOLIC BLOOD PRESSURE: 130 MMHG

## 2025-02-11 DIAGNOSIS — G43.709 CHRONIC MIGRAINE WITHOUT AURA WITHOUT STATUS MIGRAINOSUS, NOT INTRACTABLE: Primary | ICD-10-CM

## 2025-02-11 PROCEDURE — 64615 CHEMODENERV MUSC MIGRAINE: CPT | Performed by: PHYSICIAN ASSISTANT

## 2025-02-11 NOTE — TELEPHONE ENCOUNTER
Called and left message for patient to return a call to [phone] and schedule 3 month follow up with provider (Aliyah Nunn). Please schedule upon return call. Thank you.

## 2025-02-11 NOTE — PROGRESS NOTES
Universal Protocol   Consent: Verbal consent obtained. Written consent obtained.  Risks and benefits: risks, benefits and alternatives were discussed  Consent given by: patient  Patient understanding: patient states understanding of the procedure being performed  Patient consent: the patient's understanding of the procedure matches consent given  Procedure consent: procedure consent matches procedure scheduled      Chemodenervation     Date/Time  2/11/2025 8:00 AM     Performed by  Jacki Perez PA-C   Authorized by  Jacki Perez PA-C     Pre-procedure details      Prepped With: Alcohol     Procedure details      Position:  Upright   Botox      Botox Type:  Type A    Brand:  Botox    mL's of Botulinum Toxin:  200    Final Concentration per CC:  100 units    Needle Gauge:  30 G 2.5 inch   Procedures      Botox Procedures: chronic headache      Indications: migraines     Injection Location      Head / Face:  L superior trapezius, R superior trapezius, L superior cervical paraspinal, R superior cervical paraspinal, L , R , procerus, L temporalis, R temporalis, R frontalis, L frontalis, R medial occipitalis and L medial occipitalis    L  injection amount:  5 unit(s)    R  injection amount:  5 unit(s)    L lateral frontalis:  5 unit(s)    R lateral frontalis:  5 unit(s)    L medial frontalis:  5 unit(s)    R medial frontalis:  5 unit(s)    L temporalis injection amount:  20 unit(s)    R temporalis injection amount:  20 unit(s)    Procerus injection amount:  5 unit(s)    L medial occipitalis injection amount:  15 unit(s)    R medial occipitalis injection amount:  15 unit(s)    L superior cervical paraspinal injection amount:  10 unit(s)    R superior cervical paraspinal injection amount:  10 unit(s)    L superior trapezius injection amount:  0 unit(s)    R superior trapezius injection amount:  0 unit(s)   Total Units      Total units used:  200    Total units discarded:  0    Post-procedure details      Chemodenervation:  Chronic migraine    Facial Nerve Location::  Bilateral facial nerve    Patient tolerance of procedure:  Tolerated well, no immediate complications   Comments        Medically necessary:  - 30 units between the R and L headband region  - 35 units between each anterior temporalis and scalp  - 5 each masseter (10 total)  Avoided traps b/l.       Blood pressure 130/76, pulse 85, temperature (!) 97.4 °F (36.3 °C), temperature source Temporal, not currently breastfeeding.

## 2025-02-11 NOTE — TELEPHONE ENCOUNTER
Patient contacted the office to schedule a follow up visit with provider. Patient is now scheduled for 5/5/25  at  1:30 pm virtually.

## 2025-02-12 ENCOUNTER — APPOINTMENT (OUTPATIENT)
Dept: PHYSICAL THERAPY | Age: 35
End: 2025-02-12
Payer: COMMERCIAL

## 2025-02-13 PROBLEM — J06.9 ACUTE URI: Status: RESOLVED | Noted: 2025-01-14 | Resolved: 2025-02-13

## 2025-02-14 ENCOUNTER — TELEMEDICINE (OUTPATIENT)
Dept: BEHAVIORAL/MENTAL HEALTH CLINIC | Facility: CLINIC | Age: 35
End: 2025-02-14
Payer: COMMERCIAL

## 2025-02-14 DIAGNOSIS — F41.1 GENERALIZED ANXIETY DISORDER: Chronic | ICD-10-CM

## 2025-02-14 DIAGNOSIS — F33.1 DEPRESSION, MAJOR, RECURRENT, MODERATE (HCC): Primary | Chronic | ICD-10-CM

## 2025-02-14 PROCEDURE — 90837 PSYTX W PT 60 MINUTES: CPT | Performed by: SOCIAL WORKER

## 2025-02-14 NOTE — PSYCH
Virtual Regular Visit    Verification of patient location:    Patient is located at Home in the following state in which I hold an active license [place]      Assessment/Plan:    Problem List Items Addressed This Visit          Behavioral Health    Generalized anxiety disorder (Chronic)    Depression, major, recurrent, moderate (HCC) - Primary (Chronic)       Goals addressed in session: Goal 1     Depression Follow-up Plan Completed: Yes    Reason for visit is   Chief Complaint   Patient presents with    Virtual Regular Visit          Encounter provider APARNA Fox      Recent Visits  No visits were found meeting these conditions.  Showing recent visits within past 7 days and meeting all other requirements  Future Appointments  No visits were found meeting these conditions.  Showing future appointments within next 150 days and meeting all other requirements       The patient was identified by name and date of birth. Cruz Schneider was informed that this is a telemedicine visit and that the visit is being conducted throughthe Epic Embedded platform. She agrees to proceed..  My office door was closed. No one else was in the room.  She acknowledged consent and understanding of privacy and security of the video platform. The patient has agreed to participate and understands they can discontinue the visit at any time.    Patient is aware this is a billable service.     Subjective  Cruz Schneider is a 34 y.o. female.      HPI     Past Medical History:   Diagnosis Date    Abnormal Pap smear of cervix     Allergic     Anxiety     Arthritis     Dental caries     Depression     Fibromyalgia, primary     GERD (gastroesophageal reflux disease)     IBS (irritable bowel syndrome)     Migraine     Obesity     Vitamin B12 deficiency        Past Surgical History:   Procedure Laterality Date    COLONOSCOPY N/A 5/8/2018    Procedure: COLONOSCOPY;  Surgeon: Stephanie Alston MD;  Location: [facility] GI LAB;  Service:  Gastroenterology    NC EXC B9 LESION MRGN XCP SK TG S/N/H/F/G > 4.0CM N/A 7/1/2021    Procedure: EXCISION OF PILAR SCALP CYST X 2 AND RIGHT INNER GROIN NEVVUS;  Surgeon: Denis Barron MD;  Location:  MAIN OR;  Service: Plastics    NC LAPS SURG CHOLECYSTECTOMY W/CHOLANGIOGRAPHY N/A 12/26/2024    Procedure: CHOLECYSTECTOMY LAPAROSCOPIC;  Surgeon: Kulwinder Nunn MD;  Location: [facility] MAIN [facility];  Service: General    NC REPAIR COMPLEX SCALP/ARM/LEG 2.6-7.5 CM N/A 7/1/2021    Procedure: CLOSURE WOUND SCALP X 2 AND RIGHT INNER GROIN;  Surgeon: Denis Barron MD;  Location:  MAIN OR;  Service: Plastics    TONSILLECTOMY      WISDOM TOOTH EXTRACTION         Current Outpatient Medications   Medication Sig Dispense Refill    acetaminophen (TYLENOL) 500 mg tablet Take 500 mg by mouth every 6 (six) hours as needed for mild pain (1-2 tablets)      ALPRAZolam (XANAX) 0.5 mg tablet Take 1 tablet (0.5 mg total) by mouth 3 (three) times a day as needed for anxiety Do not start before December 11, 2024. 90 tablet 2    ascorbic acid (VITAMIN C) 500 MG tablet Take 500 mg by mouth daily      bisacodyl (DULCOLAX) 5 mg EC tablet Take as directed by GI office (Patient not taking: Reported on 12/12/2024) 4 tablet 0    Botox 200 units SOLR       buPROPion (WELLBUTRIN XL) 300 mg 24 hr tablet Take 1 tablet (300 mg total) by mouth daily 90 tablet 0    Cholecalciferol (Vitamin D3) 50 MCG (2000 UT) capsule Take 1 capsule (2,000 Units total) by mouth daily 90 capsule 1    cyanocobalamin 1,000 mcg/mL 1 IM injection every month 3 mL 3    dihydroergotamine (MIGRANAL) 4 MG/ML nasal spray 1 spray into each nostril as needed for migraine Use in one nostril as directed.  No more than 4 sprays in one hour 8 mL 0    diphenoxylate-atropine (LOMOTIL) 2.5-0.025 mg per tablet Take 1 tablet by mouth 4 (four) times a day as needed for diarrhea 30 tablet 0    escitalopram (LEXAPRO) 20 mg tablet Take 1 tablet (20 mg total) by mouth daily 90 tablet 0     etonogestrel-ethinyl estradiol (NuvaRing) 0.12-0.015 MG/24HR vaginal ring Insert ring for 21 days then remove for one week. 3 each 4    FeroSul 325 (65 Fe) MG tablet Take 1 tablet (325 mg total) by mouth in the morning 90 tablet 1    gabapentin (NEURONTIN) 300 mg capsule Take 1 capsule (300 mg total) by mouth 3 (three) times a day 90 capsule 5    Galcanezumab-gnlm (Emgality) 120 MG/ML SOAJ Inject as directed 1 ml Subcutaneous  every 30 days 1 mL 11    indomethacin (INDOCIN) 25 mg capsule 1-2 tabs BID prn severe headache with food. Hold toradol. 30 capsule 6    magnesium Oxide (MAG-OX) 400 mg TABS Take 1 tablet (400 mg total) by mouth daily 30 tablet 5    methocarbamol (ROBAXIN) 500 mg tablet Take 1 tablet (500 mg total) by mouth 3 (three) times a day 270 tablet 1    mupirocin (BACTROBAN) 2 % cream Apply bid to areas needed 30 g 5    mupirocin (BACTROBAN) 2 % ointment Apply topically 3 (three) times a day 15 g 5    nystatin (MYCOSTATIN) cream Apply topically 2 (two) times a day 15 g 0    OLANZapine (ZyPREXA) 7.5 mg tablet Take 1 tablet (7.5 mg total) by mouth daily at bedtime Do not take with reglan. 90 tablet 0    ondansetron (ZOFRAN) 4 mg tablet Take 1 tablet (4 mg total) by mouth every 6 (six) hours 30 tablet 2    polyethylene glycol (MIRALAX) 17 g packet Take 17 g by mouth if needed      pramipexole (MIRAPEX) 0.25 mg tablet Take 1 tablet (0.25 mg total) by mouth daily at bedtime 30 tablet 1    prazosin (MINIPRESS) 2 mg capsule Take 1 capsule (2 mg total) by mouth daily at bedtime 90 capsule 0    RABEprazole (ACIPHEX) 20 MG tablet Take 1 tablet by mouth twice daily 60 tablet 5    Semaglutide-Weight Management (Wegovy) 0.25 MG/0.5ML Inject 0.5 mL (0.25 mg total) under the skin once a week Inject 0.25 mg under the skin weekly 2 mL 0    Trudhesa 0.725 MG/ACT AERS 1 spray in each nostril for total dose of 1.45mg, may repeat 1 dose after 1 hour. Max 2 doses per 24 hours and 3 doses per week. 12 mL 2     Current  Facility-Administered Medications   Medication Dose Route Frequency Provider Last Rate Last Admin    cyanocobalamin injection 1,000 mcg  1,000 mcg Intramuscular Q30 Days Oni Beckham, DO   1,000 mcg at 03/14/24 1450    cyanocobalamin injection 1,000 mcg  1,000 mcg Intramuscular Q30 Days    1,000 mcg at 11/21/24 1704        Allergies   Allergen Reactions    Cimzia [Certolizumab Pegol] Swelling     Face and hands    Desvenlafaxine Other (See Comments)     Other reaction(s): state of confusion    Ixekizumab Vomiting    Seasonal Ic [Octacosanol] Nasal Congestion    Tizanidine Anxiety     Panic attacks       Review of Systems    Video Exam    There were no vitals filed for this visit.    Physical Exam     Behavioral Health Psychotherapy Progress Note    Psychotherapy Provided: Individual Psychotherapy     1. Depression, major, recurrent, moderate (HCC)        2. Generalized anxiety disorder            Goals addressed in session: Goal 1     DATA: Met with Stephanie for her scheduled individual session. She states that the weather has been impacting her physical health, and she has felt like she has been in a physical health flare. She said that she is experiencing symptoms that are similar to a mild flu. Stephanie states that the anniversary of her grandmother's death is coming up soon. She states that she has been missing her more than she has in a while. This clinician validated her experience and discussed her normal emotions. Stephanie discussed her feelings about the political climate. She has snoozed all of her connections with Facebook groups that are related to politics-- including her town group and all support groups and liberal groups. We discussed the importance of protecting her emotions ane making sure that she is taking in more positive stimuli than negative stimuli. She states that she is practicing setting boundaries with her friend, Jacqueline. She states that she had an interaction with her recently, and she felt  "proud of herself for the boundaries that she was able to hold with her. We discussed increasing ways that Stephanie can incorporate mindfulness into her daily routine. She states that she has not been doing as much meditation as she has in the past, and she did feel that these activities were helpful to her maintaining a more even mood.     During this session, this clinician used the following therapeutic modalities: Client-centered Therapy, Dialectical Behavior Therapy, Mindfulness-based Strategies, Motivational Interviewing, Solution-Focused Therapy, and Supportive Psychotherapy    Substance Abuse was not addressed during this session. If the client is diagnosed with a co-occurring substance use disorder, please indicate any changes in the frequency or amount of use: n/a. Stage of change for addressing substance use diagnoses: No substance use/Not applicable    ASSESSMENT:  Stephanie Schneider presents with a Euthymic/ normal mood.     her affect is Normal range and intensity, which is congruent, with her mood and the content of the session. The client has made progress on their goals.    Stephanie Schneider presents with a minimal risk of suicide, minimal risk of self-harm, and minimal risk of harm to others.    For any risk assessment that surpasses a \"low\" rating, a safety plan must be developed.    A safety plan was indicated: no  If yes, describe in detail n/a    PLAN: Between sessions, Stephanie Schneider will continue to work on developing her mindfulness skills. She will continue to set limits/boundaries and will continue to work on socializing and increasing her engagement in activities that bring her ciara. At the next session, the therapist will use Client-centered Therapy, Dialectical Behavior Therapy, Mindfulness-based Strategies, Motivational Interviewing, Solution-Focused Therapy, and Supportive Psychotherapy to address her mood regulation and relationship concerns.    Behavioral Health Treatment Plan and Discharge " Planning: Stephanie Schneider is aware of and agrees to continue to work on their treatment plan. They have identified and are working toward their discharge goals. yes    Depression Follow-up Plan Completed: Yes. Continue with therapy. Continue with medication management and adherence.     Visit start and stop times:    02/14/25  Start Time: 0804  Stop Time: 0900  Total Visit Time: 56 minutes

## 2025-02-17 ENCOUNTER — OFFICE VISIT (OUTPATIENT)
Dept: PHYSICAL THERAPY | Age: 35
End: 2025-02-17
Payer: COMMERCIAL

## 2025-02-17 DIAGNOSIS — M79.7 FIBROMYALGIA: Primary | ICD-10-CM

## 2025-02-17 PROCEDURE — 97113 AQUATIC THERAPY/EXERCISES: CPT

## 2025-02-17 NOTE — PROGRESS NOTES
"Daily Note     Today's date: 2025  Patient name: Cruz Schneider  : 1990  MRN: [id number]  Referring provider: Cate Malave DO  Dx:   Encounter Diagnosis     ICD-10-CM    1. Fibromyalgia  M79.7                      Subjective: \"My pain is about a 5/10 at my ribs and hips\"       Objective: See treatment diary below      Assessment: Decreased pain only while in aqua environment. Progress as able       Plan: Cont with plan of care      Precautions:  Patient's PMHx is remarkable for AS, Migraine, HTN, GERD, IBS, Myofascial Muscle Pain, Restless leg syndrome, Vitamin B 12 and D Deficiency.      Manuals                                                                Neuro Re-Ed                                                                 Ther Ex                          Water walking  5x            Standing lumbar spine extension hep 20x                        Seated hamstring stretch:B:  20sec 5x            LAQ:B:  20x            Seated hip flexion:B:  20x           Seated QL stretch:B:  20sec 5x            UT stretch:B:  20sec 5x            LS stretch:B:  20SEC 5X            Cervical retraction  NT           Seated postural correction  NT                        Standing abdominal strengthening  NT           Scapular squeezes  20X 3SEC            Standing hr and tr:B:  20X            Standing slr x 3:B:  20X            Standing hamstring curls:B:  20X           Mini squats  20X           Lunges:B:  NT             SLS and tandem stance:B:  30SEC 5X            Forward step ups:B:  NT           Side stepping and tandem ambulation  NT           Shoulder scaption and flexion:B:  NT           Shoulder paddles rows, horizontal adduction and abduction shoulder abduction and adduction:B:  NT                        Pool pony bicycle  5 MIN            Pool pony hang  NT                                                                                                                   Ther Activity           "                             Gait Training                                       Modalities

## 2025-02-19 ENCOUNTER — OFFICE VISIT (OUTPATIENT)
Dept: PHYSICAL THERAPY | Age: 35
End: 2025-02-19
Payer: COMMERCIAL

## 2025-02-19 DIAGNOSIS — M79.7 FIBROMYALGIA: Primary | ICD-10-CM

## 2025-02-19 PROCEDURE — 97113 AQUATIC THERAPY/EXERCISES: CPT | Performed by: PHYSICAL THERAPIST

## 2025-02-19 NOTE — PROGRESS NOTES
Daily Note     Today's date: 2025  Patient name: Cruz Schneider  : 1990  MRN: [id number]  Referring provider: Cate Malave DO  Dx:   Encounter Diagnosis     ICD-10-CM    1. Fibromyalgia  M79.7                      Subjective: Patient reported she feel recently and injured her left heel.  Patient noted she has lumbar spine, cervical spine soreness.  Patient noted cervical spine and lumbar spine pain is at moderate levels but her left heel is at 5 of 10.      Objective: See treatment diary below.      Assessment: Patient presents with short term pain reduction with use of pool based therapeutic exercises and activities.  Patient presents with delayed onset muscle soreness after last PT visit as her reports of increase in muscle soreness which patient was educated that this was a normal symptom presentation after starting new exercises.  Patient lacks long term pain reduction and mobility progress despite short term pain reduction during and after pool based PT treatment.  Thus, PT is warranted to facilitate long term multiple region pain reduction, cervical, lumbar spine and bilateral upper extremity and lower extremity mobility to promote self functional progress to resume self functional activities without pain limitations.       Plan: Cont with plan of care      Precautions:  Patient's PMHx is remarkable for AS, Migraine, HTN, GERD, IBS, Myofascial Muscle Pain, Restless leg syndrome, Vitamin B 12 and D Deficiency.      Manuals                                                               Neuro Re-Ed                                                                 Ther Ex                          Water walking  5x  5 x           Standing lumbar spine extension hep 20x 2  x 10                       Seated hamstring stretch:B:  20sec 5x  20 sec x 5          LAQ:B:  20x  2 x 10          Seated hip flexion:B:  20x 2 x 10          Seated QL stretch:B:  20sec 5x  20 sec  x 5          UT  stretch:B:  20sec 5x  20 sec x 5          LS stretch:B:  20SEC 5X  20 sec x 5          Cervical retraction  NT NT          Seated postural correction  NT NT                       Standing abdominal strengthening  NT NT          Scapular squeezes  20X 3SEC  3 sec x 20          Standing hr and tr:B:  20X  2 x 10          Standing slr x 3:B:  20X  2 x 10          Standing hamstring curls:B:  20X 2 x 10          Mini squats  20X 2 x 10          Lunges:B:  NT NT            SLS and tandem stance:B:  30SEC 5X  30 sec x 5          Forward step ups:B:  NT NT          Side stepping and tandem ambulation  NT NT          Shoulder scaption and flexion:B:  NT NT          Shoulder paddles rows, horizontal adduction and abduction shoulder abduction and adduction:B:  NT NT                       Pool pony bicycle  5 MIN  5 min          Pool pony hang  NT 5 min                                                                                                                  Ther Activity                                       Gait Training                                       Modalities

## 2025-02-21 ENCOUNTER — APPOINTMENT (OUTPATIENT)
Dept: LAB | Facility: CLINIC | Age: 35
End: 2025-02-21
Payer: COMMERCIAL

## 2025-02-21 DIAGNOSIS — Z79.899 LONG TERM CURRENT USE OF ANTIPSYCHOTIC MEDICATION: ICD-10-CM

## 2025-02-21 LAB
ALBUMIN SERPL BCG-MCNC: 3.5 G/DL (ref 3.5–5)
ALP SERPL-CCNC: 75 U/L (ref 34–104)
ALT SERPL W P-5'-P-CCNC: 9 U/L (ref 7–52)
ANION GAP SERPL CALCULATED.3IONS-SCNC: 13 MMOL/L (ref 4–13)
AST SERPL W P-5'-P-CCNC: 13 U/L (ref 13–39)
BILIRUB SERPL-MCNC: 0.47 MG/DL (ref 0.2–1)
BUN SERPL-MCNC: 9 MG/DL (ref 5–25)
CALCIUM SERPL-MCNC: 9 MG/DL (ref 8.4–10.2)
CHLORIDE SERPL-SCNC: 104 MMOL/L (ref 96–108)
CHOLEST SERPL-MCNC: 210 MG/DL (ref ?–200)
CO2 SERPL-SCNC: 21 MMOL/L (ref 21–32)
CREAT SERPL-MCNC: 0.67 MG/DL (ref 0.6–1.3)
EST. AVERAGE GLUCOSE BLD GHB EST-MCNC: 120 MG/DL
GFR SERPL CREATININE-BSD FRML MDRD: 115 ML/MIN/1.73SQ M
GLUCOSE P FAST SERPL-MCNC: 96 MG/DL (ref 65–99)
HBA1C MFR BLD: 5.8 %
HDLC SERPL-MCNC: 52 MG/DL
LDLC SERPL CALC-MCNC: 122 MG/DL (ref 0–100)
POTASSIUM SERPL-SCNC: 4 MMOL/L (ref 3.5–5.3)
PROT SERPL-MCNC: 6.9 G/DL (ref 6.4–8.4)
SODIUM SERPL-SCNC: 138 MMOL/L (ref 135–147)
TRIGL SERPL-MCNC: 182 MG/DL (ref ?–150)

## 2025-02-21 PROCEDURE — 36415 COLL VENOUS BLD VENIPUNCTURE: CPT

## 2025-02-21 PROCEDURE — 80061 LIPID PANEL: CPT

## 2025-02-21 PROCEDURE — 80053 COMPREHEN METABOLIC PANEL: CPT

## 2025-02-21 PROCEDURE — 83036 HEMOGLOBIN GLYCOSYLATED A1C: CPT

## 2025-02-24 ENCOUNTER — TELEPHONE (OUTPATIENT)
Dept: PSYCHIATRY | Facility: CLINIC | Age: 35
End: 2025-02-24

## 2025-02-24 ENCOUNTER — OFFICE VISIT (OUTPATIENT)
Dept: PHYSICAL THERAPY | Age: 35
End: 2025-02-24
Payer: COMMERCIAL

## 2025-02-24 DIAGNOSIS — M79.7 FIBROMYALGIA: Primary | ICD-10-CM

## 2025-02-24 PROCEDURE — 97113 AQUATIC THERAPY/EXERCISES: CPT

## 2025-02-24 NOTE — TELEPHONE ENCOUNTER
Stephanie called back and I informed her, as per provider to follow up with her PCP regarding her Lipid panel and Hemoglobin AIC.  No medication changes being addressed at this time.

## 2025-02-24 NOTE — TELEPHONE ENCOUNTER
Received a message from Dr. Nunn requesting we contact Stephanie and ask her to follow up with her PCP for cholesterol management. Called Stephanie and kathrine ARORA requesting a call back. Upon return call will review most recent labs.

## 2025-02-24 NOTE — PROGRESS NOTES
"Daily Note     Today's date: 2025  Patient name: Cruz Schneider  : 1990  MRN: [id number]  Referring provider: Cate Malave DO  Dx:   Encounter Diagnosis     ICD-10-CM    1. Fibromyalgia  M79.7                        Subjective: Patient reported \"I am having chest, and left rib pain today\"       Objective: See treatment diary below.      Assessment: Decreased pain and improved mobility after aqua based exercises.       Plan: Cont with plan of care      Precautions:  Patient's PMHx is remarkable for AS, Migraine, HTN, GERD, IBS, Myofascial Muscle Pain, Restless leg syndrome, Vitamin B 12 and D Deficiency.      Manuals                                    Neuro Re-Ed                          Ther Ex                          Water walking  5x  5 x  5x          Standing lumbar spine extension hep 20x 2  x 10 20x                       Seated hamstring stretch:B:  20sec 5x  20 sec x 5 20sec 5x          LAQ:B:  20x  2 x 10 20x         Seated hip flexion:B:  20x 2 x 10 20x          Seated QL stretch:B:  20sec 5x  20 sec  x 5 20sec 5x         UT stretch:B:  20sec 5x  20 sec x 5 20sec 5x         LS stretch:B:  20SEC 5X  20 sec x 5 20sec 5x         Cervical retraction  NT NT 20X 3SEC          Seated postural correction  NT NT NT                      Standing abdominal strengthening  NT NT NT         Scapular squeezes  20X 3SEC  3 sec x 20 20X 3SEC          Standing hr and tr:B:  20X  2 x 10 20X         Standing slr x 3:B:  20X  2 x 10 20X         Standing hamstring curls:B:  20X 2 x 10 20X          Mini squats  20X 2 x 10 20X          Lunges:B:  NT NT NT           SLS and tandem stance:B:  30SEC 5X  30 sec x 5 30SEC  tamdem only          Forward step ups:B:  NT NT NT         Side stepping and tandem ambulation  NT NT 6X         Shoulder scaption and flexion:B:  NT NT NT         Shoulder paddles rows, horizontal adduction and abduction shoulder abduction and adduction:B:  NT NT NT                "       Pool pony bicycle  5 MIN  5 min 5 MIN          Pool pony hang  NT 5 min 5 MIN                                                                                                                  Ther Activity                                       Gait Training                                       Modalities

## 2025-02-24 NOTE — TELEPHONE ENCOUNTER
Please reach out to pt  Recommendation is office visit or UC eval? Unless GI specialist has additional recommendation for pts constipation  Reason for Disposition   Vomiting and abdomen looks much more swollen than usual    Answer Assessment - Initial Assessment Questions  1  STOOL PATTERN OR FREQUENCY: "How often do you pass bowel movements (BMs)?"  (Normal range: tid to q 3 days)  "When was the last BM passed?"        Usually has trouble having a BM or trouble with diarrhea  Has hx of IBS  Last BM was possibly one week ago (could not give exact date)  2  STRAINING: "Do you have to strain to have a BM?"       Yes    3  RECTAL PAIN: "Does your rectum hurt when the stool comes out?" If Yes, ask: "Do you have hemorrhoids? How bad is the pain?"  (Scale 1-10; or mild, moderate, severe)      No    4  STOOL COMPOSITION: "Are the stools hard?"       Unknown  5  BLOOD ON STOOLS: "Has there been any blood on the toilet tissue or on the surface of the BM?" If Yes, ask: "When was the last time?"       No    6  CHRONIC CONSTIPATION: "Is this a new problem for you?"  If no, ask: "How long have you had this problem?" (days, weeks, months)       IBS    7  CHANGES IN DIET OR HYDRATION: "Have there been any recent changes in your diet?" "How much fluids are you drinking consuming on a daily basis?"  "How much have you had to drink today?"      No    8  MEDICATIONS: "Have you been taking any new medications?" "Are you taking any narcotic pain medications?" (e g , Vicoden, Percocet, morphine, dilaudid)      No new meds  9  LAXATIVES: "Have you been using any stool softeners, laxatives, or enemas?"  If yes, ask "What, how often, and when was the last time?"  10  ACTIVITY:  "How much walking do you do every day? on a daily basis?"  "Has your activity level decreased in the past week?"         Chocolate laxative- taken this morning           Dulcolax suppository-did last night       Mag citrate- 1/2 bottle- taken on 7/27 and 7/28    11  CAUSE: "What do you think is causing the constipation?"         Hx of IBS    12  OTHER SYMPTOMS: "Do you have any other symptoms?" (e g , abdominal pain, bloating, fever, vomiting)        Some bloating, mild abdominal cramping, some tenderness, and mid abdomen feels hard (but not entire abdomen  Some nausea for the last week  No vomiting  No fever  13  MEDICAL HISTORY: "Do you have a history of hemorrhoids, rectal fissures, or rectal surgery or rectal abscess?"          IBS    14  PREGNANCY: "Is there any chance you are pregnant?" "When was your last menstrual period?"        Denies      Protocols used: CONSTIPATION-ADULT-[place] Opt out

## 2025-02-26 ENCOUNTER — TELEPHONE (OUTPATIENT)
Age: 35
End: 2025-02-26

## 2025-02-26 ENCOUNTER — OFFICE VISIT (OUTPATIENT)
Dept: PHYSICAL THERAPY | Age: 35
End: 2025-02-26
Payer: COMMERCIAL

## 2025-02-26 DIAGNOSIS — M79.7 FIBROMYALGIA: Primary | ICD-10-CM

## 2025-02-26 PROCEDURE — 97113 AQUATIC THERAPY/EXERCISES: CPT

## 2025-02-26 NOTE — TELEPHONE ENCOUNTER
Patient called in regarding her rescheduled appointment from 3/6/25 with Tanisha Newman. Patient was rescheduled by the office to see Mic Horne on 3/20/25 instead and she states that she was not notified of this appointment change. Patient would like her appointment to be moved back to 3/6/25 and if this is not possible, she would like a call to explain why it was changed without her knowledge. Patient expressed her displeasure of not being notified of the appointment change. Please call the patient to discuss at [phone].

## 2025-02-26 NOTE — PROGRESS NOTES
"Daily Note     Today's date: 2025  Patient name: rCuz Schneider  : 1990  MRN: [id number]  Referring provider: Cate Malave DO  Dx:   Encounter Diagnosis     ICD-10-CM    1. Fibromyalgia  M79.7                          Subjective: Patient reported \"Same rib pain as before but both my hips are clicking so much\"       Objective: See treatment diary below.      Assessment: Modified session due to B hip increased pain. Progress as able       Plan: Cont with plan of care      Precautions:  Patient's PMHx is remarkable for AS, Migraine, HTN, GERD, IBS, Myofascial Muscle Pain, Restless leg syndrome, Vitamin B 12 and D Deficiency.      Manuals                                   Neuro Re-Ed                          Ther Ex                          Water walking  5x  5 x  5x  5x         Standing lumbar spine extension hep 20x 2  x 10 20x  20x                      Seated hamstring stretch:B:  20sec 5x  20 sec x 5 20sec 5x  20sec 5x         LAQ:B:  20x  2 x 10 20x 20x        Seated hip flexion:B:  20x 2 x 10 20x  20x        Seated QL stretch:B:  20sec 5x  20 sec  x 5 20sec 5x 20sec 5x        UT stretch:B:  20sec 5x  20 sec x 5 20sec 5x [id number]         LS stretch:B:  20SEC 5X  20 sec x 5 20sec 5x 20sec 5x        Cervical retraction  NT NT 20X 3SEC  20x 3sec         Seated postural correction  NT NT NT NT                     Standing abdominal strengthening  NT NT NT NT        Scapular squeezes  20X 3SEC  3 sec x 20 20X 3SEC  20X 3SEC        Standing hr and tr:B:  20X  2 x 10 20X 20X        Standing slr x 3:B:  20X  2 x 10 20X 20X        Standing hamstring curls:B:  20X 2 x 10 20X  20X        Mini squats  20X 2 x 10 20X  20X        Lunges:B:  NT NT NT NT          SLS and tandem stance:B:  30SEC 5X  30 sec x 5 30SEC  tamdem only  30SEC 5X         Forward step ups:B:  NT NT NT NT        Side stepping and tandem ambulation  NT NT 6X 6X         Shoulder scaption and flexion:B:  NT NT NT NT      "   Shoulder paddles rows, horizontal adduction and abduction shoulder abduction and adduction:B:  NT NT NT NT                     Pool pony bicycle  5 MIN  5 min 5 MIN  5 MIN         Pool pony hang  NT 5 min 5 MIN  5 MIN                                                                                                                 Ther Activity                                       Gait Training                                       Modalities

## 2025-02-28 ENCOUNTER — TELEMEDICINE (OUTPATIENT)
Dept: BEHAVIORAL/MENTAL HEALTH CLINIC | Facility: CLINIC | Age: 35
End: 2025-02-28
Payer: COMMERCIAL

## 2025-02-28 DIAGNOSIS — F41.1 GENERALIZED ANXIETY DISORDER: Chronic | ICD-10-CM

## 2025-02-28 DIAGNOSIS — B37.2 CANDIDAL DERMATITIS: ICD-10-CM

## 2025-02-28 DIAGNOSIS — F33.1 DEPRESSION, MAJOR, RECURRENT, MODERATE (HCC): Primary | Chronic | ICD-10-CM

## 2025-02-28 PROCEDURE — 90837 PSYTX W PT 60 MINUTES: CPT | Performed by: SOCIAL WORKER

## 2025-02-28 RX ORDER — NYSTATIN 100000 U/G
CREAM TOPICAL 2 TIMES DAILY
Qty: 15 G | Refills: 0 | Status: SHIPPED | OUTPATIENT
Start: 2025-02-28

## 2025-03-03 ENCOUNTER — OFFICE VISIT (OUTPATIENT)
Dept: PHYSICAL THERAPY | Age: 35
End: 2025-03-03
Payer: COMMERCIAL

## 2025-03-03 DIAGNOSIS — M79.7 FIBROMYALGIA: Primary | ICD-10-CM

## 2025-03-03 DIAGNOSIS — G43.709 CHRONIC MIGRAINE WITHOUT AURA WITHOUT STATUS MIGRAINOSUS, NOT INTRACTABLE: ICD-10-CM

## 2025-03-03 PROCEDURE — 97113 AQUATIC THERAPY/EXERCISES: CPT

## 2025-03-03 NOTE — PROGRESS NOTES
Daily Note     Today's date: 3/3/2025  Patient name: Cruz Schneider  : 1990  MRN: [id number]  Referring provider: Cate Malave DO  Dx:   Encounter Diagnosis     ICD-10-CM    1. Fibromyalgia  M79.7                            Subjective: Patient reported 710 pain today.      Objective: See treatment diary below.      Assessment: Fair tolerance to exercises. Improved relief of symptoms between aqua based sessions.       Plan: Cont with plan of care      Precautions:  Patient's PMHx is remarkable for AS, Migraine, HTN, GERD, IBS, Myofascial Muscle Pain, Restless leg syndrome, Vitamin B 12 and D Deficiency.      Manuals 2/5 2/17 2/19 2/24 2/26 3/3                                 Neuro Re-Ed                          Ther Ex                          Water walking  5x  5 x  5x  5x  5x       Standing lumbar spine extension hep 20x 2  x 10 20x  20x                      Seated hamstring stretch:B:  20sec 5x  20 sec x 5 20sec 5x  20sec 5x  20sec 5x       LAQ:B:  20x  2 x 10 20x 20x 20x        Seated hip flexion:B:  20x 2 x 10 20x  20x 20x       Seated QL stretch:B:  20sec 5x  20 sec  x 5 20sec 5x 20sec 5x 20sec 5x        UT stretch:B:  20sec 5x  20 sec x 5 20sec 5x [id number]  20sec 5x        LS stretch:B:  20SEC 5X  20 sec x 5 20sec 5x 20sec 5x 20sec 5x        Cervical retraction  NT NT 20X 3SEC  20x 3sec  20x 3sec        Seated postural correction  NT NT NT NT NT                    Standing abdominal strengthening  NT NT NT NT NT       Scapular squeezes  20X 3SEC  3 sec x 20 20X 3SEC  20X 3SEC 20X 3SEC        Standing hr and tr:B:  20X  2 x 10 20X 20X 20X       Standing slr x 3:B:  20X  2 x 10 20X 20X 20X       Standing hamstring curls:B:  20X 2 x 10 20X  20X 20X        Mini squats  20X 2 x 10 20X  20X 20X       Lunges:B:  NT NT NT NT   NT       SLS and tandem stance:B:  30SEC 5X  30 sec x 5 30SEC  tamdem only  30SEC 5X  30SEC 5X        Forward step ups:B:  NT NT NT NT NT       Side stepping and tandem ambulation   NT NT 6X 6X  6X        Shoulder scaption and flexion:B:  NT NT NT NT NT       Shoulder paddles rows, horizontal adduction and abduction shoulder abduction and adduction:B:  NT NT NT NT NT                    Pool pony bicycle  5 MIN  5 min 5 MIN  5 MIN  5 MIN        Pool pony hang  NT 5 min 5 MIN  5 MIN  5 MIN                                                                                                                Ther Activity                                       Gait Training                                       Modalities

## 2025-03-04 ENCOUNTER — OFFICE VISIT (OUTPATIENT)
Age: 35
End: 2025-03-04
Payer: COMMERCIAL

## 2025-03-04 VITALS
DIASTOLIC BLOOD PRESSURE: 82 MMHG | SYSTOLIC BLOOD PRESSURE: 124 MMHG | OXYGEN SATURATION: 98 % | TEMPERATURE: 97.9 F | HEIGHT: 63 IN | WEIGHT: 244 LBS | BODY MASS INDEX: 43.23 KG/M2 | HEART RATE: 93 BPM

## 2025-03-04 DIAGNOSIS — M25.532 LEFT WRIST PAIN: Primary | ICD-10-CM

## 2025-03-04 DIAGNOSIS — L30.9 HAND DERMATITIS: ICD-10-CM

## 2025-03-04 PROCEDURE — 99213 OFFICE O/P EST LOW 20 MIN: CPT | Performed by: FAMILY MEDICINE

## 2025-03-04 RX ORDER — NYSTATIN AND TRIAMCINOLONE ACETONIDE 100000; 1 [USP'U]/G; MG/G
CREAM TOPICAL 4 TIMES DAILY
Qty: 60 G | Refills: 2 | Status: SHIPPED | OUTPATIENT
Start: 2025-03-04

## 2025-03-04 RX ORDER — INDOMETHACIN 25 MG/1
CAPSULE ORAL
Qty: 30 CAPSULE | Refills: 5 | Status: SHIPPED | OUTPATIENT
Start: 2025-03-04

## 2025-03-04 RX ORDER — MELOXICAM 15 MG/1
15 TABLET ORAL DAILY PRN
Qty: 30 TABLET | Refills: 0 | Status: SHIPPED | OUTPATIENT
Start: 2025-03-04

## 2025-03-04 NOTE — PROGRESS NOTES
"Assessment/Plan:       Diagnoses and all orders for this visit:    Left wrist pain  Comments:  Patient is meloxicam and ice as directed.  To consider splint  Orders:  -     meloxicam (MOBIC) 15 mg tablet; Take 1 tablet (15 mg total) by mouth daily as needed for moderate pain    Hand dermatitis  -     nystatin-triamcinolone (MYCOLOG-II) cream; Apply topically 4 (four) times a day            Subjective:        Patient ID: Cruz Schneider is a 34 y.o. female.      Patient is here with bilateral wrist pain.  Patient has a left wrist pain roughly a week ago which resolved but now has right wrist pain for the past week or so.  No trauma.  No neck pain chest pain or shortness of breath associate with this.  Patient using gabapentin and Tylenol.  Patient also with rib pain.    Wrist Pain           The following portions of the patient's history were reviewed and updated as appropriate: allergies, current medications, past family history, past medical history, past social history, past surgical history and problem list.      Review of Systems   Constitutional: Negative.    HENT: Negative.     Eyes: Negative.    Respiratory: Negative.     Cardiovascular:  Positive for chest pain.   Gastrointestinal: Negative.    Endocrine: Negative.    Genitourinary: Negative.    Musculoskeletal:  Positive for arthralgias.   Skin: Negative.    Allergic/Immunologic: Negative.    Neurological: Negative.    Hematological: Negative.    Psychiatric/Behavioral: Negative.             Objective:               /82 (BP Location: Right arm, Patient Position: Sitting, Cuff Size: Large)   Pulse 93   Temp 97.9 °F (36.6 °C) (Temporal)   Ht 5' 3\" (1.6 m)   Wt 111 kg (244 lb)   SpO2 98%   BMI 43.22 kg/m²          Physical Exam  Vitals and nursing note reviewed.   Constitutional:       General: She is not in acute distress.     Appearance: Normal appearance. She is not ill-appearing, toxic-appearing or diaphoretic.   HENT:      Head: " Normocephalic and atraumatic.   Cardiovascular:      Rate and Rhythm: Normal rate and regular rhythm.      Pulses: Normal pulses.      Heart sounds: Normal heart sounds.   Pulmonary:      Effort: Pulmonary effort is normal.      Breath sounds: Normal breath sounds.   Musculoskeletal:         General: Tenderness present.      Comments: Right wrist pain with palpation.  Some pain with lateral flexion.  Some radiation to the forearm.  Neurovascular intact.   Neurological:      Mental Status: She is alert.

## 2025-03-05 ENCOUNTER — OFFICE VISIT (OUTPATIENT)
Dept: PHYSICAL THERAPY | Age: 35
End: 2025-03-05
Payer: COMMERCIAL

## 2025-03-05 DIAGNOSIS — I10 PRIMARY HYPERTENSION: ICD-10-CM

## 2025-03-05 DIAGNOSIS — M79.7 FIBROMYALGIA: Primary | ICD-10-CM

## 2025-03-05 PROCEDURE — 97113 AQUATIC THERAPY/EXERCISES: CPT

## 2025-03-05 NOTE — PROGRESS NOTES
"Daily Note     Today's date: 3/5/2025  Patient name: Cruz Schneider  : 1990  MRN: [id number]  Referring provider: Cate Malave DO  Dx:   Encounter Diagnosis     ICD-10-CM    1. Fibromyalgia  M79.7                              Subjective: Patient reported \" I am in a flare today\"       Objective: See treatment diary below.      Assessment: decreased pain only while in aqua environment. Progress as able       Plan: Cont with plan of care      Precautions:  Patient's PMHx is remarkable for AS, Migraine, HTN, GERD, IBS, Myofascial Muscle Pain, Restless leg syndrome, Vitamin B 12 and D Deficiency.      Manuals 2/5 2/17 2/19 2/24 2/26 3/3 3/5                                Neuro Re-Ed                          Ther Ex                          Water walking  5x  5 x  5x  5x  5x 5x       Standing lumbar spine extension hep 20x 2  x 10 20x  20x                      Seated hamstring stretch:B:  20sec 5x  20 sec x 5 20sec 5x  20sec 5x  20sec 5x 20sec 5x       LAQ:B:  20x  2 x 10 20x 20x 20x  20x      Seated hip flexion:B:  20x 2 x 10 20x  20x 20x 20x       Seated QL stretch:B:  20sec 5x  20 sec  x 5 20sec 5x 20sec 5x 20sec 5x  20sec 5x       UT stretch:B:  20sec 5x  20 sec x 5 20sec 5x [id number]  20sec 5x  20sec 5x       LS stretch:B:  20SEC 5X  20 sec x 5 20sec 5x 20sec 5x 20sec 5x  20sec 5x       Cervical retraction  NT NT 20X 3SEC  20x 3sec  20x 3sec  20x 3sec       Seated postural correction  NT NT NT NT NT NT                   Standing abdominal strengthening  NT NT NT NT NT NT      Scapular squeezes  20X 3SEC  3 sec x 20 20X 3SEC  20X 3SEC 20X 3SEC  20X 3SEC       Standing hr and tr:B:  20X  2 x 10 20X 20X 20X 20X      Standing slr x 3:B:  20X  2 x 10 20X 20X 20X 20X      Standing hamstring curls:B:  20X 2 x 10 20X  20X 20X  20X      Mini squats  20X 2 x 10 20X  20X 20X 20X      Lunges:B:  NT NT NT NT   NT NT      SLS and tandem stance:B:  30SEC 5X  30 sec x 5 30SEC  tamdem only  30SEC 5X  30SEC 5X  30SEC 5X    "    Forward step ups:B:  NT NT NT NT NT NT      Side stepping and tandem ambulation  NT NT 6X 6X  6X  6X       Shoulder scaption and flexion:B:  NT NT NT NT NT NT      Shoulder paddles rows, horizontal adduction and abduction shoulder abduction and adduction:B:  NT NT NT NT NT NT                   Pool pony bicycle  5 MIN  5 min 5 MIN  5 MIN  5 MIN  5 MIN       Pool pony hang  NT 5 min 5 MIN  5 MIN  5 MIN  5 MIN                                                                                                                                                                                Ther Activity                                       Gait Training                                       Modalities

## 2025-03-06 ENCOUNTER — OFFICE VISIT (OUTPATIENT)
Dept: OTOLARYNGOLOGY | Facility: CLINIC | Age: 35
End: 2025-03-06
Payer: COMMERCIAL

## 2025-03-06 VITALS — HEIGHT: 63 IN | BODY MASS INDEX: 43.23 KG/M2 | WEIGHT: 244 LBS

## 2025-03-06 DIAGNOSIS — M79.18 MYOFASCIAL PAIN: ICD-10-CM

## 2025-03-06 DIAGNOSIS — J34.89 DRY NOSE: Primary | ICD-10-CM

## 2025-03-06 DIAGNOSIS — H92.02 LEFT EAR PAIN: ICD-10-CM

## 2025-03-06 PROCEDURE — 99243 OFF/OP CNSLTJ NEW/EST LOW 30: CPT

## 2025-03-06 RX ORDER — METOPROLOL SUCCINATE 25 MG/1
25 TABLET, EXTENDED RELEASE ORAL DAILY
Qty: 90 TABLET | Refills: 2 | OUTPATIENT
Start: 2025-03-06

## 2025-03-06 NOTE — PROGRESS NOTES
[facility]  [facility]  [facility] Otolaryngology      Otolaryngology -- New Patient Visit      Assessment:   1. Dry nose  sodium chloride (OCEAN) 0.65 % nasal spray      2. Left ear pain  Ambulatory Referral to Otolaryngology    Ambulatory referral to Physical Therapy      3. Myofascial pain  Ambulatory referral to Physical Therapy          Orders  Orders Placed This Encounter   Procedures    Ambulatory referral to Physical Therapy     Standing Status:   Future     Expiration Date:   3/6/2026     Referral Priority:   Routine     Referral Type:   Physical Therapy     Referral Reason:   Specialty Services Required     Requested Specialty:   Physical Therapy     Number of Visits Requested:   1     Expiration Date:   3/6/2026         Discussion/Plan:    1. Ears well appearing bilaterally on otoscopic exam. TM's intact. No infection, effusion, perforation, or retraction.  Ear exam today is normal, with clear signs of TMJ with crepitus and pain with opening and closing jaw. We discussed the natural history of TMJ and its relationship to referred otalgia at length today as well as initial recommendations for conservative treatment.   -TMJ mitigation with: Warm compress TID, soft diet, no chewing gum, dental mouth guard and Motrin.  Can also consider PT. Referral placed.    2. Dry nose: crusting on septum bilaterally L>R.  Ponaris and saline spray. Continue with humidifier.    Follow up 5-6 months        Cruz Schneider is a 34 y.o. who presents with a chief complaint of left ear, scabbing in the nose    HPI:  Cruz Schneider presents to the office with concerns of left ear pain and scabbing in nose.   Left ear started years ago. Hx of fibromyalgia. Gets botox for migraines. Helps for a period of time and then returns.   Nose with crusting x 2 years. Worse int he winter. Tried mupirocin ointment without improvement and ayr gel. Nose bleeds once a month. Uses humidifier.      Allergies    Allergen Reactions    Cimzia [Certolizumab Pegol] Swelling     Face and hands    Desvenlafaxine Other (See Comments)     Other reaction(s): state of confusion    Ixekizumab Vomiting    Seasonal Ic [Octacosanol] Nasal Congestion    Tizanidine Anxiety     Panic attacks     Past Medical History:   Diagnosis Date    Abnormal Pap smear of cervix     Allergic     Anxiety     Arthritis     Dental caries     Depression     Fibromyalgia, primary     GERD (gastroesophageal reflux disease)     IBS (irritable bowel syndrome)     Migraine     Obesity     Vitamin B12 deficiency      Past Surgical History:   Procedure Laterality Date    COLONOSCOPY N/A 5/8/2018    Procedure: COLONOSCOPY;  Surgeon: Stephanie Alston MD;  Location: [facility] GI LAB;  Service: Gastroenterology    AK EXC B9 LESION MRGN XCP SK TG S/N/H/F/G > 4.0CM N/A 7/1/2021    Procedure: EXCISION OF PILAR SCALP CYST X 2 AND RIGHT INNER GROIN NEVVUS;  Surgeon: Denis Barron MD;  Location:  MAIN OR;  Service: Plastics    AK LAPS SURG CHOLECYSTECTOMY W/CHOLANGIOGRAPHY N/A 12/26/2024    Procedure: CHOLECYSTECTOMY LAPAROSCOPIC;  Surgeon: Kulwinder Nunn MD;  Location: [facility];  Service: General    AK REPAIR COMPLEX SCALP/ARM/LEG 2.6-7.5 CM N/A 7/1/2021    Procedure: CLOSURE WOUND SCALP X 2 AND RIGHT INNER GROIN;  Surgeon: Denis Braron MD;  Location:  MAIN OR;  Service: Plastics    TONSILLECTOMY      WISDOM TOOTH EXTRACTION       Family History   Problem Relation Age of Onset    Rheum arthritis Mother     Psoriasis Mother     Other Mother     Hypertension Mother     Diabetes unspecified Mother     Sjogren's syndrome Mother     Alcohol abuse Mother     Drug abuse Mother     Anxiety disorder Father     Alcohol abuse Father     Lung cancer Maternal Grandfather     Cancer Other         bone    Diabetes Other     Other Other         High blood pressure    Depression Maternal Grandmother      Current Outpatient Medications on File Prior to Visit   Medication Sig  Dispense Refill    acetaminophen (TYLENOL) 500 mg tablet Take 500 mg by mouth every 6 (six) hours as needed for mild pain (1-2 tablets)      ALPRAZolam (XANAX) 0.5 mg tablet Take 1 tablet (0.5 mg total) by mouth 3 (three) times a day as needed for anxiety Do not start before December 11, 2024. 90 tablet 2    ascorbic acid (VITAMIN C) 500 MG tablet Take 500 mg by mouth daily      Botox 200 units SOLR       buPROPion (WELLBUTRIN XL) 300 mg 24 hr tablet Take 1 tablet (300 mg total) by mouth daily 90 tablet 0    Cholecalciferol (Vitamin D3) 50 MCG (2000 UT) capsule Take 1 capsule (2,000 Units total) by mouth daily 90 capsule 1    cyanocobalamin 1,000 mcg/mL 1 IM injection every month 3 mL 3    dihydroergotamine (MIGRANAL) 4 MG/ML nasal spray 1 spray into each nostril as needed for migraine Use in one nostril as directed.  No more than 4 sprays in one hour 8 mL 0    diphenoxylate-atropine (LOMOTIL) 2.5-0.025 mg per tablet Take 1 tablet by mouth 4 (four) times a day as needed for diarrhea 30 tablet 0    escitalopram (LEXAPRO) 20 mg tablet Take 1 tablet (20 mg total) by mouth daily 90 tablet 0    etonogestrel-ethinyl estradiol (NuvaRing) 0.12-0.015 MG/24HR vaginal ring Insert ring for 21 days then remove for one week. 3 each 4    FeroSul 325 (65 Fe) MG tablet Take 1 tablet (325 mg total) by mouth in the morning 90 tablet 1    gabapentin (NEURONTIN) 300 mg capsule Take 1 capsule (300 mg total) by mouth 3 (three) times a day 90 capsule 5    Galcanezumab-gnlm (Emgality) 120 MG/ML SOAJ Inject as directed 1 ml Subcutaneous  every 30 days 1 mL 11    indomethacin (INDOCIN) 25 mg capsule 1-2 tabs BID prn severe headache with food. Hold toradol. 30 capsule 5    meloxicam (MOBIC) 15 mg tablet Take 1 tablet (15 mg total) by mouth daily as needed for moderate pain 30 tablet 0    methocarbamol (ROBAXIN) 500 mg tablet Take 1 tablet (500 mg total) by mouth 3 (three) times a day 270 tablet 1    mupirocin (BACTROBAN) 2 % cream Apply bid to  "areas needed 30 g 5    mupirocin (BACTROBAN) 2 % ointment Apply topically 3 (three) times a day 15 g 5    nystatin (MYCOSTATIN) cream Apply topically 2 (two) times a day 15 g 0    nystatin-triamcinolone (MYCOLOG-II) cream Apply topically 4 (four) times a day 60 g 2    OLANZapine (ZyPREXA) 7.5 mg tablet Take 1 tablet (7.5 mg total) by mouth daily at bedtime Do not take with reglan. 90 tablet 0    ondansetron (ZOFRAN) 4 mg tablet Take 1 tablet (4 mg total) by mouth every 6 (six) hours 30 tablet 2    polyethylene glycol (MIRALAX) 17 g packet Take 17 g by mouth if needed      pramipexole (MIRAPEX) 0.25 mg tablet Take 1 tablet (0.25 mg total) by mouth daily at bedtime 30 tablet 1    prazosin (MINIPRESS) 2 mg capsule Take 1 capsule (2 mg total) by mouth daily at bedtime 90 capsule 0    RABEprazole (ACIPHEX) 20 MG tablet Take 1 tablet by mouth twice daily 60 tablet 5    Semaglutide-Weight Management (Wegovy) 0.25 MG/0.5ML Inject 0.5 mL (0.25 mg total) under the skin once a week Inject 0.25 mg under the skin weekly 2 mL 0    Trudhesa 0.725 MG/ACT AERS 1 spray in each nostril for total dose of 1.45mg, may repeat 1 dose after 1 hour. Max 2 doses per 24 hours and 3 doses per week. 12 mL 2    bisacodyl (DULCOLAX) 5 mg EC tablet Take as directed by GI office (Patient not taking: Reported on 12/12/2024) 4 tablet 0    magnesium Oxide (MAG-OX) 400 mg TABS Take 1 tablet (400 mg total) by mouth daily (Patient not taking: Reported on 3/6/2025) 30 tablet 5     Current Facility-Administered Medications on File Prior to Visit   Medication Dose Route Frequency Provider Last Rate Last Admin    cyanocobalamin injection 1,000 mcg  1,000 mcg Intramuscular Q30 Days Oni Beckham DO   1,000 mcg at 03/14/24 1450    cyanocobalamin injection 1,000 mcg  1,000 mcg Intramuscular Q30 Days    1,000 mcg at 11/21/24 1704           Results reviewed; images from any scan have been personally reviewed:        Physical exam:    Ht 5' 3\" (1.6 m)   Wt 111 kg " (244 lb)   BMI 43.22 kg/m²     Physical Exam  Vitals reviewed.   Constitutional:       General: She is not in acute distress.     Appearance: She is well-developed.   HENT:      Head: Normocephalic and atraumatic.      Right Ear: Tympanic membrane, ear canal and external ear normal. There is no impacted cerumen.      Left Ear: Tympanic membrane, ear canal and external ear normal. There is no impacted cerumen.      Ears:      Comments: Pain and crepitus jaw     Nose: Nose normal. No congestion.      Comments: Crusting on septum bilaterally. L>R     Mouth/Throat:      Mouth: Mucous membranes are moist.      Pharynx: Oropharynx is clear. No oropharyngeal exudate.   Eyes:      General:         Right eye: No discharge.         Left eye: No discharge.      Extraocular Movements: Extraocular movements intact.      Conjunctiva/sclera: Conjunctivae normal.      Pupils: Pupils are equal, round, and reactive to light.   Pulmonary:      Effort: Pulmonary effort is normal. No respiratory distress.      Breath sounds: Normal breath sounds.   Musculoskeletal:      Cervical back: Normal range of motion and neck supple.   Neurological:      Mental Status: She is alert.   Psychiatric:         Mood and Affect: Mood normal.         Procedures        Dictation software was used to dictate this note. It may contain errors with dictating incorrect words/spelling. Please contact provider directly for any questions.     Thank you for allowing me to participate in the care of your patient.

## 2025-03-10 ENCOUNTER — OFFICE VISIT (OUTPATIENT)
Dept: PHYSICAL THERAPY | Age: 35
End: 2025-03-10
Payer: COMMERCIAL

## 2025-03-10 DIAGNOSIS — M79.7 FIBROMYALGIA: Primary | ICD-10-CM

## 2025-03-10 PROCEDURE — 97113 AQUATIC THERAPY/EXERCISES: CPT

## 2025-03-10 NOTE — PROGRESS NOTES
Daily Note     Today's date: 3/10/2025  Patient name: Cruz Schneider  : 1990  MRN: [id number]  Referring provider: Cate Malave DO  Dx:   Encounter Diagnosis     ICD-10-CM    1. Fibromyalgia  M79.7           Start Time: 1200  Stop Time: 1305  Total time in clinic (min): 65 minutes    Subjective: Pt chief complaint is having pain in her bones e.g. shoulders/collarbone/wrists.       Objective: See treatment diary below      Assessment: Tolerated treatment well. Pt continues to be appropriately challenged with current program. No exacerbations noted. Patient demonstrated fatigue post treatment, exhibited good technique with therapeutic exercises, and would benefit from continued PT      Plan: Continue per plan of care.      Precautions:  Patient's PMHx is remarkable for AS, Migraine, HTN, GERD, IBS, Myofascial Muscle Pain, Restless leg syndrome, Vitamin B 12 and D Deficiency.      Manuals 2/5 2/17 2/19 2/24 2/26 3/3 3/5 3/10                                  Neuro Re-Ed                            Ther Ex                            Water walking  5x  5 x  5x  5x  5x 5x  5x      Standing lumbar spine extension hep 20x 2  x 10 20x  20x                        Seated hamstring stretch:B:  20sec 5x  20 sec x 5 20sec 5x  20sec 5x  20sec 5x 20sec 5x  20sec 5x      LAQ:B:  20x  2 x 10 20x 20x 20x  20x 20x      Seated hip flexion:B:  20x 2 x 10 20x  20x 20x 20x  20x      Seated QL stretch:B  20sec 5x  20 sec  x 5 20sec 5x 20sec 5x 20sec 5x  20sec 5x  20sec 5x      Standing hip flexor stretch        20sec 5x      UT stretch:B:  20sec 5x  20 sec x 5 20sec 5x [id number]  20sec 5x  20sec 5x  20sec 5x      LS stretch:B:  20SEC 5X  20 sec x 5 20sec 5x 20sec 5x 20sec 5x  20sec 5x  20sec 5x      Cervical retraction  NT NT 20X 3SEC  20x 3sec  20x 3sec  20x 3sec  20x 3sec      Seated postural correction  NT NT NT NT NT NT performed                    Standing abdominal strengthening  NT NT NT NT NT NT NT      Scapular squeezes   20X 3SEC  3 sec x 20 20X 3SEC  20X 3SEC 20X 3SEC  20X 3SEC  20x 3 SEC      Standing hr and tr:B:  20X  2 x 10 20X 20X 20X 20X 20x      Standing slr x 3:B:  20X  2 x 10 20X 20X 20X 20X 20x      Standing hamstring curls:B:  20X 2 x 10 20X  20X 20X  20X 20x      Mini squats  20X 2 x 10 20X  20X 20X 20X 20x      Lunges:B:  NT NT NT NT   NT NT NT      SLS and tandem stance:B:  30SEC 5X  30 sec x 5 30SEC  tamdem only  30SEC 5X  30SEC 5X  30SEC 5X  30SEC 5x      Forward step ups:B:  NT NT NT NT NT NT NT      Side stepping and tandem ambulation  NT NT 6X 6X  6X  6X  6x      Shoulder scaption and flexion:B:  NT NT NT NT NT NT NT      Shoulder paddles rows, horizontal adduction and abduction shoulder abduction and adduction:B:  NT NT NT NT NT NT NT                    Pool pony bicycle  5 MIN  5 min 5 MIN  5 MIN  5 MIN  5 MIN  5 MIN      Pool pony hang  NT 5 min 5 MIN  5 MIN  5 MIN  5 MIN  5 MIN                                                                                                                                                                                            Ther Activity                                          Gait Training                                          Modalities

## 2025-03-11 ENCOUNTER — TELEPHONE (OUTPATIENT)
Dept: PSYCHIATRY | Facility: CLINIC | Age: 35
End: 2025-03-11

## 2025-03-12 ENCOUNTER — OFFICE VISIT (OUTPATIENT)
Dept: PHYSICAL THERAPY | Age: 35
End: 2025-03-12
Payer: COMMERCIAL

## 2025-03-12 DIAGNOSIS — M79.7 FIBROMYALGIA: Primary | ICD-10-CM

## 2025-03-12 PROCEDURE — 97113 AQUATIC THERAPY/EXERCISES: CPT

## 2025-03-12 NOTE — PROGRESS NOTES
"Daily Note     Today's date: 3/12/2025  Patient name: Cruz Schneider  : 1990  MRN: [id number]  Referring provider: Cate Malave DO  Dx:   Encounter Diagnosis     ICD-10-CM    1. Fibromyalgia  M79.7                        Subjective: Pt noted chest and clavicle pain \"it just feels so sore. My hips click on I lay on my back or when I do the bike with the pool ponies\"       Objective: See treatment diary below      Assessment: Improved tolerance to exercises.       Plan: Continue per plan of care.      Precautions:  Patient's PMHx is remarkable for AS, Migraine, HTN, GERD, IBS, Myofascial Muscle Pain, Restless leg syndrome, Vitamin B 12 and D Deficiency.      Manuals 2/5 2/17 2/19 2/24 2/26 3/3 3/5 3/10 3/12                                 Neuro Re-Ed                            Ther Ex                            Water walking  5x  5 x  5x  5x  5x 5x  5x 5x      Standing lumbar spine extension hep 20x 2  x 10 20x  20x     20x      Seated Lumbar flexion with TURTLE          20X      Seated hamstring stretch:B:  20sec 5x  20 sec x 5 20sec 5x  20sec 5x  20sec 5x 20sec 5x  20sec 5x 2sec 5x      LAQ:B:  20x  2 x 10 20x 20x 20x  20x 20x 20x     Seated hip flexion:B:  20x 2 x 10 20x  20x 20x 20x  20x 20x     Seated QL stretch:B  20sec 5x  20 sec  x 5 20sec 5x 20sec 5x 20sec 5x  20sec 5x  20sec 5x 20sec 5x      Standing hip flexor stretch        20sec 5x 20sec 5x      UT stretch:B:  20sec 5x  20 sec x 5 20sec 5x [id number]  20sec 5x  20sec 5x  20sec 5x 20sec 5x     LS stretch:B:  20SEC 5X  20 sec x 5 20sec 5x 20sec 5x 20sec 5x  20sec 5x  20sec 5x 20sec 5x     Cervical retraction  NT NT 20X 3SEC  20x 3sec  20x 3sec  20x 3sec  20x 3sec 20x 3sec      Seated postural correction  NT NT NT NT NT NT performed 20X 3SEC                    Standing abdominal strengthening  NT NT NT NT NT NT NT NT     Scapular squeezes  20X 3SEC  3 sec x 20 20X 3SEC  20X 3SEC 20X 3SEC  20X 3SEC  20x 3 SEC 20X 3SEC      Standing hr and tr:B:  20X "  2 x 10 20X 20X 20X 20X 20x 20X     Standing slr x 3:B:  20X  2 x 10 20X 20X 20X 20X 20x 20X     Standing hamstring curls:B:  20X 2 x 10 20X  20X 20X  20X 20x 20X      Mini squats  20X 2 x 10 20X  20X 20X 20X 20x 20X     Lunges:B:  NT NT NT NT   NT NT NT NT     SLS and tandem stance:B:  30SEC 5X  30 sec x 5 30SEC  tamdem only  30SEC 5X  30SEC 5X  30SEC 5X  30SEC 5x 30SEC 5X      Forward step ups:B:  NT NT NT NT NT NT NT NT     Side stepping and tandem ambulation  NT NT 6X 6X  6X  6X  6x 6X      Shoulder scaption and flexion:B:  NT NT NT NT NT NT NT NT     Shoulder paddles rows, horizontal adduction and abduction shoulder abduction and adduction:B:  NT NT NT NT NT NT NT NT                   Pool pony bicycle  5 MIN  5 min 5 MIN  5 MIN  5 MIN  5 MIN  5 MIN 5 MIN  On bench        Pool pony hang  NT 5 min 5 MIN  5 MIN  5 MIN  5 MIN  5 MIN 5 min                                                                                                                                                                                            Ther Activity                                          Gait Training                                          Modalities

## 2025-03-12 NOTE — TELEPHONE ENCOUNTER
Spoke to patient ans she stated that she did not requested this medication. Pt has some medications on auto-refill. She will talk to the pharmacy about this.

## 2025-03-14 ENCOUNTER — TELEMEDICINE (OUTPATIENT)
Dept: BEHAVIORAL/MENTAL HEALTH CLINIC | Facility: CLINIC | Age: 35
End: 2025-03-14
Payer: COMMERCIAL

## 2025-03-14 DIAGNOSIS — F41.1 GENERALIZED ANXIETY DISORDER: Chronic | ICD-10-CM

## 2025-03-14 DIAGNOSIS — F33.1 DEPRESSION, MAJOR, RECURRENT, MODERATE (HCC): Primary | Chronic | ICD-10-CM

## 2025-03-14 PROCEDURE — 90837 PSYTX W PT 60 MINUTES: CPT | Performed by: SOCIAL WORKER

## 2025-03-17 ENCOUNTER — OFFICE VISIT (OUTPATIENT)
Dept: PHYSICAL THERAPY | Age: 35
End: 2025-03-17
Payer: COMMERCIAL

## 2025-03-17 DIAGNOSIS — M79.7 FIBROMYALGIA: Primary | ICD-10-CM

## 2025-03-17 PROCEDURE — 97113 AQUATIC THERAPY/EXERCISES: CPT

## 2025-03-17 NOTE — PROGRESS NOTES
"Daily Note     Today's date: 3/17/2025  Patient name: Cruz Schneider  : 1990  MRN: [id number]  Referring provider: Ctae Malave DO  Dx:   Encounter Diagnosis     ICD-10-CM    1. Fibromyalgia  M79.7                          Subjective: Pt noted overall soreness\"       Objective: See treatment diary below      Assessment: Good tolerance to exercises. Progress as able.       Plan: Continue per plan of care.      Precautions:  Patient's PMHx is remarkable for AS, Migraine, HTN, GERD, IBS, Myofascial Muscle Pain, Restless leg syndrome, Vitamin B 12 and D Deficiency.      Manuals 2/5 2/17 2/19 2/24 2/26 3/3 3/5 3/10 3/12 3/17                                Neuro Re-Ed                            Ther Ex                            Water walking  5x  5 x  5x  5x  5x 5x  5x 5x  5x    Standing lumbar spine extension hep 20x 2  x 10 20x  20x     20x  20x     Seated Lumbar flexion with TURTLE          20X  20x    Seated hamstring stretch:B:  20sec 5x  20 sec x 5 20sec 5x  20sec 5x  20sec 5x 20sec 5x  20sec 5x 2sec 5x  20sec 5x     LAQ:B:  20x  2 x 10 20x 20x 20x  20x 20x 20x 20x     Seated hip flexion:B:  20x 2 x 10 20x  20x 20x 20x  20x 20x 20x     Seated QL stretch:B  20sec 5x  20 sec  x 5 20sec 5x 20sec 5x 20sec 5x  20sec 5x  20sec 5x 20sec 5x  20sec 5x     Standing hip flexor stretch        20sec 5x 20sec 5x  20sec 5x     UT stretch:B:  20sec 5x  20 sec x 5 20sec 5x [id number]  20sec 5x  20sec 5x  20sec 5x 20sec 5x 20sec 5x    LS stretch:B:  20SEC 5X  20 sec x 5 20sec 5x 20sec 5x 20sec 5x  20sec 5x  20sec 5x 20sec 5x 20sec 5x     Cervical retraction  NT NT 20X 3SEC  20x 3sec  20x 3sec  20x 3sec  20x 3sec 20x 3sec  20x 3sec     Seated postural correction  NT NT NT NT NT NT performed 20X 3SEC  20x 3sec                   Standing abdominal strengthening  NT NT NT NT NT NT NT NT NT    Scapular squeezes  20X 3SEC  3 sec x 20 20X 3SEC  20X 3SEC 20X 3SEC  20X 3SEC  20x 3 SEC 20X 3SEC  20X 3SEC     Standing hr and tr:B:  " 20X  2 x 10 20X 20X 20X 20X 20x 20X 20X     Standing slr x 3:B:  20X  2 x 10 20X 20X 20X 20X 20x 20X 20X     Standing hamstring curls:B:  20X 2 x 10 20X  20X 20X  20X 20x 20X  20X     Mini squats  20X 2 x 10 20X  20X 20X 20X 20x 20X 20X     Lunges:B:  NT NT NT NT   NT NT NT NT NT    SLS and tandem stance:B:  30SEC 5X  30 sec x 5 30SEC  tamdem only  30SEC 5X  30SEC 5X  30SEC 5X  30SEC 5x 30SEC 5X  30 SEC5X     Forward step ups:B:  NT NT NT NT NT NT NT NT NT    Side stepping and tandem ambulation  NT NT 6X 6X  6X  6X  6x 6X  6X    Shoulder scaption and flexion:B:  NT NT NT NT NT NT NT NT NT    Shoulder paddles rows, horizontal adduction and abduction shoulder abduction and adduction:B:  NT NT NT NT NT NT NT NT NT                  Pool pony bicycle  5 MIN  5 min 5 MIN  5 MIN  5 MIN  5 MIN  5 MIN 5 MIN  On bench    5 MIN     Pool pony hang  NT 5 min 5 MIN  5 MIN  5 MIN  5 MIN  5 MIN 5 min                                                                                                                                                                                            Ther Activity                                          Gait Training                                          Modalities

## 2025-03-19 ENCOUNTER — EVALUATION (OUTPATIENT)
Dept: PHYSICAL THERAPY | Age: 35
End: 2025-03-19
Payer: COMMERCIAL

## 2025-03-19 DIAGNOSIS — M79.7 FIBROMYALGIA: Primary | ICD-10-CM

## 2025-03-19 PROCEDURE — 97113 AQUATIC THERAPY/EXERCISES: CPT

## 2025-03-19 PROCEDURE — 97750 PHYSICAL PERFORMANCE TEST: CPT

## 2025-03-19 NOTE — PROGRESS NOTES
"Daily Note     Today's date: 3/19/2025  Patient name: Cruz Schneider  : 1990  MRN: [id number]  Referring provider: Cate Malave DO  Dx:   Encounter Diagnosis     ICD-10-CM    1. Fibromyalgia  M79.7                            Subjective: Pt noted \"the pain was worse yesterday. Perhaps with the change of weather\"       Objective: See treatment diary below      Assessment: Good tolerance to exercises. Improved mobility. Pt is independent with exercises.       Plan: D/C to [facility] at a community pool     Precautions:  Patient's PMHx is remarkable for AS, Migraine, HTN, GERD, IBS, Myofascial Muscle Pain, Restless leg syndrome, Vitamin B 12 and D Deficiency.      Manuals 2/5 2/17 2/19 2/24 2/26 3/3 3/5 3/10 3/12 3/17 3/19                               Neuro Re-Ed                            Ther Ex                            Water walking  5x  5 x  5x  5x  5x 5x  5x 5x  5x 5x   Standing lumbar spine extension hep 20x 2  x 10 20x  20x     20x  20x  20x   Seated Lumbar flexion with TURTLE          20X  20x 20x   Seated hamstring stretch:B:  20sec 5x  20 sec x 5 20sec 5x  20sec 5x  20sec 5x 20sec 5x  20sec 5x 2sec 5x  20sec 5x  20sec 5x   LAQ:B:  20x  2 x 10 20x 20x 20x  20x 20x 20x 20x  20x   Seated hip flexion:B:  20x 2 x 10 20x  20x 20x 20x  20x 20x 20x  20x   Seated QL stretch:B  20sec 5x  20 sec  x 5 20sec 5x 20sec 5x 20sec 5x  20sec 5x  20sec 5x 20sec 5x  20sec 5x  20sec 5x    Standing hip flexor stretch        20sec 5x 20sec 5x  20sec 5x  20sec 5x   UT stretch:B:  20sec 5x  20 sec x 5 20sec 5x [id number]  20sec 5x  20sec 5x  20sec 5x 20sec 5x 20sec 5x 20sec 5x   LS stretch:B:  20SEC 5X  20 sec x 5 20sec 5x 20sec 5x 20sec 5x  20sec 5x  20sec 5x 20sec 5x 20sec 5x  20sec 5x   Cervical retraction  NT NT 20X 3SEC  20x 3sec  20x 3sec  20x 3sec  20x 3sec 20x 3sec  20x 3sec  20x 5x    Seated postural correction  NT NT NT NT NT NT performed 20X 3SEC  20x 3sec  20x 3sec                  Standing abdominal strengthening  NT " NT NT NT NT NT NT NT NT NT   Scapular squeezes  20X 3SEC  3 sec x 20 20X 3SEC  20X 3SEC 20X 3SEC  20X 3SEC  20x 3 SEC 20X 3SEC  20X 3SEC  20X SEC   Standing hr and tr:B:  20X  2 x 10 20X 20X 20X 20X 20x 20X 20X  20X    Standing slr x 3:B:  20X  2 x 10 20X 20X 20X 20X 20x 20X 20X  20X   Standing hamstring curls:B:  20X 2 x 10 20X  20X 20X  20X 20x 20X  20X  20X   Mini squats  20X 2 x 10 20X  20X 20X 20X 20x 20X 20X  20X    Lunges:B:  NT NT NT NT   NT NT NT NT NT NT   SLS and tandem stance:B:  30SEC 5X  30 sec x 5 30SEC  tamdem only  30SEC 5X  30SEC 5X  30SEC 5X  30SEC 5x 30SEC 5X  30 SEC5X  30SEC 5X    Forward step ups:B:  NT NT NT NT NT NT NT NT NT NT   Side stepping and tandem ambulation  NT NT 6X 6X  6X  6X  6x 6X  6X 6X    Shoulder scaption and flexion:B:  NT NT NT NT NT NT NT NT NT NT   Shoulder paddles rows, horizontal adduction and abduction shoulder abduction and adduction:B:  NT NT NT NT NT NT NT NT NT NT                 Pool pony bicycle  5 MIN  5 min 5 MIN  5 MIN  5 MIN  5 MIN  5 MIN 5 MIN  On bench    5 MIN  5 MIN    Pool pony hang  NT 5 min 5 MIN  5 MIN  5 MIN  5 MIN  5 MIN 5 min   5 MIN                                                                                                                                                                                          Ther Activity                                          Gait Training                                          Modalities

## 2025-03-19 NOTE — PROGRESS NOTES
PT Evaluation  / PT Reassessment / PT Discharge    Today's date: 3/19/2025  Patient name: Cruz Schneider  : 1990  MRN: [id number]  Referring provider: Cate Malave DO  Dx:   Encounter Diagnosis     ICD-10-CM    1. Fibromyalgia  M79.7           Start Time: 1200  Stop Time: 1300  Total time in clinic (min): 60 minutes    Assessment  Impairments: abnormal gait, abnormal or restricted ROM, abnormal movement, activity intolerance, impaired physical strength, lacks appropriate home exercise program and pain with function    Assessment details: PT Reassessment: 3/19/25.  Patient reported the following progress since onset of PT: decrease in right hip pain, increase in bilateral lower extremity mobility and strength, walking, stair ascent and sitting activities.  But, she noted the following deficits that still persists: standing, prolonged walking, stair descent, uses shower chair due to prolonged standing pain aggravating and cold weather.  Thus, patient agrees with PT POC to d/c to hep.      PT IE: 25  Patient reported she followed up with her new Rheumatologist and patient noted she was told that her FM is the primary symptom aggravating factor / cause.  Patient noted she has multiple region joint pain.  Patient noted she has rib and thoracic spine pain that limits her ability to wear a bra or tight fitting clothing.  Patient noted she has constant lumbar spine pain that will radiate into the left hip pain that radiates in the posterior left lower extremity into the ankle and the left hip pain is anterior > lateral.  Patient noted cold weather aggravates multiple region pain.  Patient noted left lower extremity is weaker than the right.  Patient denies unilateral lower extremity buckle and she denies falls.  Patient noted the following deficits that are limited by thoracic, lumbar spine, left lower extremity and rib pain: static standing activities like washing the dishes, stair climbing in which she  has stairs in her house as well as a flight to get into the second floor home.  Patient noted she sleeps too much due to constant fatigue and tiredness.  Patient noted prolonged walking is limited by thoracic, lumbar and left hip / lower extremity symptoms.  Patient noted she has to shower in a seated position due to pain aggravation.  Patient noted she had to get gall bladder removed on 12/26/24.  Patient noted she is on weygovy which she already has lost 20#.   Patient noted transfers and self dressing going without symptom aggravation.  Understanding of Dx/Px/POC: excellent     Prognosis: good  Prognosis details: Patient is a 34 y.o. year old female seen for outpatient PT reevaluation with a Fibromyalgia [M79.7] . Patient presents with the following progress since onset of PT: decrease in lumbar, thoracic and bilateral lower extremity pain, increase in lumbar and cervical spine mobility, increase in bilateral lower extremity strength and functional progress.  Patient presents to PT reassessment with the following problems, concerns, deficits and impairments: lumbar and thoracic spine and bilateral lower extremity pain, decreased lumbar and cervical spine range of motion, decreased bilateral upper and lower extremity mobility and strength, + special tests, + TTP, + muscle guarding, functional limitations and decreased tolerance to activity.  Patient would benefit from skilled PT services under the following PT treatment plan to address the above noted deficits: Pool and land based therapeutic exercises and activities to facilitate lumbar and cervical spine mobility and bilateral upper and lower extremity mobility and strength, modalities, manual therapy techniques, DLS and abdominal strengthening, postural reeducation and strengthening, modalities, manual therapy techniques, IASTM techniques, Kinesio taping techniques and a hep.  But, due to functional and impairment progress patient agrees with PT POC to d/c to  hep.  Thank you for the referral.     Goals  Short Term goals - 4 weeks  1.  Patient will be independent HEP. MET.   2.  Patient will report a 25 - 50% decrease in pain complaints.MET.  3.  Increase strength 1/2 grade. MET.  4.  Increase ROM 5-10 degrees. MET.    Long Term goals - 8 weeks  1.  Patient will report elimination of pain complaints.  Partially MET.  2.  Patient will return to all recreational activities without restriction.Partially MET.  3.  ROM WFL.Partially MET.  4.  Strength 5/5.Partially MET.  5.  Patient will report ability to perform all self and house hold chores and activities in standing without lumbar, thoracic, left hip and lower extremity symptom aggravation or limitations.Partially MET.  6.  Patient will report ability to perform all house hold walking activities without lumbar, thoracic, left hip and lower extremity symptom aggravation or limitations.Partially MET.  7.  Patient will report ability to perform house hold stair climbing activities in without lumbar, thoracic, left hip and lower extremity symptom aggravation or limitations.Partially MET.  8.  Patient will report ability to perform showering activities without lumbar, thoracic, left hip and lower extremity symptom aggravation or limitations.Partially MET.      Plan  Patient would benefit from: skilled physical therapy and PT eval  Planned modality interventions: low level laser therapy, manual electrical stimulation, TENS, thermotherapy: hydrocollator packs, ultrasound, unattended electrical stimulation, cryotherapy and electrical stimulation/Russian stimulation    Planned therapy interventions: IASTM, joint mobilization, kinesiology taping, manual therapy, massage, balance, balance/weight bearing training, neuromuscular re-education, postural training, body mechanics training, self care, compression, strengthening, stretching, therapeutic activities, therapeutic exercise, therapeutic training, transfer training, flexibility,  functional ROM exercises, gait training, graded activity, graded exercise, graded motor, home exercise program, IADL retraining and aquatic therapy    Frequency: 1-2x week  Duration in weeks: 8  Treatment plan discussed with: patient    Subjective Evaluation    History of Present Illness  Mechanism of injury:  Patient's PMHx is remarkable for AS, Migraine, HTN, GERD, IBS, Myofascial Muscle Pain, Restless leg syndrome, gall bladder removed, Vitamin B 12 and D Deficiency.  Patient Goals  Patient goals for therapy: decreased pain, increased motion, increased strength, independence with ADLs/IADLs and return to sport/leisure activities  Patient goal: patient's goal is to resume fitness walking, go to a community pool and wear a bra and tight tops without pain aggravation.  Pain  At best pain ratin  At worst pain ratin  Location: lumbar, thoracic, left hip and lower extremity      Objective     Tenderness     Additional Tenderness Details  Patient is + TTP at left thoracic spine at minimal to moderate and bilateral lumbar and thoracic spine muscle guarding at severe levels.    Active Range of Motion   Cervical/Thoracic Spine       Cervical    Flexion: 20 degrees   Extension: 28 degrees      Left lateral flexion: 20 degrees      Right lateral flexion: 22 degrees      Left rotation: 46 degrees  Right rotation: 50 degrees     Left Shoulder   Flexion: 134 degrees   Abduction: 158 degrees     Right Shoulder   Flexion: 140 degrees   Abduction: 154 degrees     Lumbar   Flexion: 92 degrees   Extension: 24 degrees  with pain  Left lateral flexion: 20 degrees     Right lateral flexion: 22 degrees   Left rotation: 56 degrees   Right rotation: 60 degrees   Left Hip   Flexion: 78 degrees with pain  Abduction: 16 degrees with pain    Right Hip   Flexion: 90 degrees   Abduction: 34 degrees   Left Knee   Flexion: 102 degrees with pain  Extension: 10 degrees with pain  Extensor la degrees     Right Knee   Flexion: 116 degrees    Extension: -2 degrees   Extensor la degrees   Left Ankle/Foot   Dorsiflexion (ke): 12 degrees   Plantar flexion: 48 degrees     Right Ankle/Foot   Dorsiflexion (ke): 16 degrees   Plantar flexion: 36 degrees     Additional Active Range of Motion Details  Hamstring mobility on right at 28 degrees and left at 32 degrees.    Strength/Myotome Testing     Left Shoulder     Planes of Motion   Flexion: 4+   Abduction: 4   External rotation at 0°: 4   Internal rotation at 0°: 4+     Right Shoulder     Planes of Motion   Flexion: 4+   Abduction: 4   External rotation at 0°: 4   Internal rotation at 0°: 4+     Left Elbow   Flexion: 4+  Extension: 4    Right Elbow   Flexion: 4+  Extension: 4    Left Hip   Planes of Motion   Flexion: 4  Extension: 4  Abduction: 4  Adduction: 4+    Right Hip   Planes of Motion   Flexion: 4  Extension: 4  Abduction: 4  Adduction: 4+    Left Knee   Flexion: 4+  Extension: 4    Right Knee   Flexion: 4+  Extension: 4    Left Ankle/Foot   Dorsiflexion: 4+  Plantar flexion: 5    Right Ankle/Foot   Dorsiflexion: 4+  Plantar flexion: 5    Tests     Lumbar     Left   Positive passive SLR and slump test.     Right   Negative passive SLR and slump test.     Left Pelvic Girdle/Sacrum   Positive: active SLR test.     Right Pelvic Girdle/Sacrum   Negative: active SLR test.     Flowsheet Rows      Flowsheet Row Most Recent Value   PT/OT G-Codes    Current Score 43   Projected Score 51               Precautions:  Patient's PMHx is remarkable for AS, Migraine, HTN, GERD, IBS, Myofascial Muscle Pain, Restless leg syndrome, Vitamin B 12 and D Deficiency.      Manuals 2/5                                                                Neuro Re-Ed                                                                                                        Ther Ex                          Water walking             Standing lumbar spine extension hep                         Seated hamstring stretch:B:             LAQ:B:              Seated hip flexion:B:             Seated QL stretch:B:             UT stretch:B:             LS stretch:B:             Cervical retraction             Seated postural correction                          Standing abdominal strengthening             Scapular squeezes             Standing hr and tr:B:             Standing slr x 3:B:             Standing hamstring curls:B:             Mini squats             Lunges:B:             SLS and tandem stance:B:             Forward step ups:B:             Side stepping and tandem ambulation             Shoulder scaption and flexion:B:             Shoulder paddles rows, horizontal adduction and abduction shoulder abduction and adduction:B:                          Pool pony bicycle             Pool pony hang                                                                                                                     Ther Activity                                       Gait Training                                       Modalities

## 2025-03-24 ENCOUNTER — APPOINTMENT (OUTPATIENT)
Dept: PHYSICAL THERAPY | Age: 35
End: 2025-03-24
Payer: COMMERCIAL

## 2025-03-26 NOTE — PSYCH
Virtual Regular VisitName: Cruz Schneider      : 1990      MRN: [id number]  Encounter Provider: APARNA Fox  Encounter Date: 2025   Encounter department: [facility]  Assessment & Plan  Depression, major, recurrent, moderate (HCC)         Generalized anxiety disorder         Depression, major, recurrent, moderate (HCC)         Generalized anxiety disorder               Goals addressed in session: Goal 1     DATA: Met with Stephanie for her scheduled individual session. She discussed her physical health issues, family issues, friends, and her grief/loss process re: the death of her grandmother. Stephanie states that she has been having a physical health flare and has not been feeling well. She states that she has been working on using her mindfulness-based coping skills. She is engaging in activiies at home that bring her ciara; e.g., doing crafts. She spends time with her mother. She has been spending time with friends as she is able. Stephanie states that she continues to work on setting limits/boundaries with Boni. We will continue to work on her ability to set appropriate limites/boundaries and not get pulled back into relationships with she feels an obligation to care for others in a transactional manner. Stephanie continues to seek alternative ways to connect with people and find new friends/people with whom she shares common experiences.     During this session, this clinician used the following therapeutic modalities: Client-centered Therapy, Dialectical Behavior Therapy, Mindfulness-based Strategies, Motivational Interviewing, Solution-Focused Therapy, and Supportive Psychotherapy    Substance Abuse was not addressed during this session. If the client is diagnosed with a co-occurring substance use disorder, please indicate any changes in the frequency or amount of use: n/a. Stage of change for addressing substance use diagnoses: No substance use/Not  "applicable    ASSESSMENT:  Cruz presents with a Euthymic/ normal mood. Cruz's affect is Normal range and intensity, which is congruent, with their mood and the content of the session. The client has not made progress on their goals since our last session. She continues to work ob building her social supports but thi is a very slow process for her. .    Cruz presents with a minimal risk of suicide, minimal risk of self-harm, and minimal risk of harm to others.    For any risk assessment that surpasses a \"low\" rating, a safety plan must be developed.    A safety plan was indicated: no  If yes, describe in detail n/a    PLAN: Between sessions, Cruz will continue to seek opportunities for meeting new people and spending time engaging in activities that bring her ciara. At the next session, the therapist will use Client-centered Therapy, Dialectical Behavior Therapy, Mindfulness-based Strategies, Motivational Interviewing, Solution-Focused Therapy, and Supportive Psychotherapy to address her mood regulation, physical health concerns and relationship concerns..    Behavioral Health Treatment Plan [facility]: Diagnosis and Treatment Plan explained to Cruz, Cruz relates understanding diagnosis and is agreeable to Treatment Plan. Yes     Depression Follow-up Plan Completed: Yes.      Reason for visit is   Chief Complaint   Patient presents with    Virtual Regular Visit        Recent Visits  No visits were found meeting these conditions.  Showing recent visits within past 7 days and meeting all other requirements  Future Appointments  No visits were found meeting these conditions.  Showing future appointments within next 150 days and meeting all other requirements     History of Present Illness     HPI    Past Medical History   Past Medical History:   Diagnosis Date    Abnormal Pap smear of cervix     Allergic     Anxiety     Arthritis     Dental caries     Depression     Fibromyalgia, primary     " GERD (gastroesophageal reflux disease)     IBS (irritable bowel syndrome)     Migraine     Obesity     Vitamin B12 deficiency      Past Surgical History:   Procedure Laterality Date    COLONOSCOPY N/A 5/8/2018    Procedure: COLONOSCOPY;  Surgeon: Stephanie Alston MD;  Location: [facility];  Service: Gastroenterology    NY EXC B9 LESION MRGN XCP SK TG S/N/H/F/G > 4.0CM N/A 7/1/2021    Procedure: EXCISION OF PILAR SCALP CYST X 2 AND RIGHT INNER GROIN NEVVUS;  Surgeon: Denis Barron MD;  Location:  MAIN OR;  Service: Plastics    NY LAPS SURG CHOLECYSTECTOMY W/CHOLANGIOGRAPHY N/A 12/26/2024    Procedure: CHOLECYSTECTOMY LAPAROSCOPIC;  Surgeon: Kulwinder Nunn MD;  Location: [facility];  Service: General    NY REPAIR COMPLEX SCALP/ARM/LEG 2.6-7.5 CM N/A 7/1/2021    Procedure: CLOSURE WOUND SCALP X 2 AND RIGHT INNER GROIN;  Surgeon: Denis Barron MD;  Location:  MAIN OR;  Service: Plastics    TONSILLECTOMY      WISDOM TOOTH EXTRACTION       Current Outpatient Medications   Medication Instructions    acetaminophen (TYLENOL) 500 mg, Every 6 hours PRN    ALPRAZolam (XANAX) 0.5 mg, Oral, 3 times daily PRN    ascorbic acid (VITAMIN C) 500 mg, Daily    bisacodyl (DULCOLAX) 5 mg EC tablet Take as directed by GI office    Botox 200 units SOLR     buPROPion (WELLBUTRIN XL) 300 mg, Oral, Daily    cyanocobalamin 1,000 mcg/mL 1 IM injection every month    dihydroergotamine (MIGRANAL) 4 MG/ML nasal spray 1 spray, Nasal, As needed, Use in one nostril as directed.  No more than 4 sprays in one hour    diphenoxylate-atropine (LOMOTIL) 2.5-0.025 mg per tablet 1 tablet, Oral, 4 times daily PRN    escitalopram (LEXAPRO) 20 mg, Oral, Daily    etonogestrel-ethinyl estradiol (NuvaRing) 0.12-0.015 MG/24HR vaginal ring Insert ring for 21 days then remove for one week.    FeroSul 325 mg, Oral, Daily    gabapentin (NEURONTIN) 300 mg, Oral, 3 times daily    Galcanezumab-gnlm (Emgality) 120 MG/ML SOAJ Inject as directed 1 ml Subcutaneous   every 30 days    indomethacin (INDOCIN) 25 mg capsule 1-2 tabs BID prn severe headache with food. Hold toradol.    magnesium Oxide (MAG-OX) 400 mg, Oral, Daily    meloxicam (MOBIC) 15 mg, Oral, Daily PRN    methocarbamol (ROBAXIN) 500 mg, Oral, 3 times daily    mupirocin (BACTROBAN) 2 % cream Apply bid to areas needed    mupirocin (BACTROBAN) 2 % ointment Topical, 3 times daily    nystatin (MYCOSTATIN) cream Topical, 2 times daily    nystatin-triamcinolone (MYCOLOG-II) cream Topical, 4 times daily    OLANZapine (ZYPREXA) 7.5 mg, Oral, Daily at bedtime, Do not take with reglan.    ondansetron (ZOFRAN) 4 mg, Oral, Every 6 hours    polyethylene glycol (MIRALAX) 17 g, As needed    pramipexole (MIRAPEX) 0.25 mg, Oral, Daily at bedtime    prazosin (MINIPRESS) 2 mg, Oral, Daily at bedtime    RABEprazole (ACIPHEX) 20 mg, Oral, 2 times daily    sodium chloride (OCEAN) 0.65 % nasal spray 1 spray, Nasal, As needed    Trudhesa 0.725 MG/ACT AERS 1 spray in each nostril for total dose of 1.45mg, may repeat 1 dose after 1 hour. Max 2 doses per 24 hours and 3 doses per week.    Vitamin D3 2,000 Units, Oral, Daily    Wegovy 0.25 mg, Subcutaneous, Weekly, Inject 0.25 mg under the skin weekly     Allergies   Allergen Reactions    Cimzia [Certolizumab Pegol] Swelling     Face and hands    Desvenlafaxine Other (See Comments)     Other reaction(s): state of confusion    Ixekizumab Vomiting    Seasonal Ic [Octacosanol] Nasal Congestion    Tizanidine Anxiety     Panic attacks       Objective   There were no vitals taken for this visit.    Video Exam  Physical Exam     Administrative Statements   Encounter provider APARNA Fox    The Patient is located at Home and in the following state in which I hold an active license [place].    The patient was identified by name and date of birth. Gregbruna SERGIO Schneider was informed that this is a telemedicine visit and that the visit is being conducted through the Epic Embedded platform. She agrees to  proceed..  My office door was closed. No one else was in the room.  She acknowledged consent and understanding of privacy and security of the video platform. The patient has agreed to participate and understands they can discontinue the visit at any time.    Visit Time  Start Time: 0801  Stop Time: 0858  Total Visit Time: 57 minutes

## 2025-03-26 NOTE — PSYCH
Virtual Regular VisitName: Cruz Schneider      : 1990      MRN: [id number]  Encounter Provider: APARNA Fox  Encounter Date: 3/14/2025   Encounter department: [facility] [place]  :  Assessment & Plan  Depression, major, recurrent, moderate (HCC)         Generalized anxiety disorder             Goals addressed in session: Goal 1     DATA: Met with Stephanie for her scheduled individual session. Stephanie states that she has been in a flare. She is somewhat upset because her aquatherapy PT will be ending very soon. She states that due to the popularity and high need for this type of therapy, she is limited in the number of sessions she is able to get approved. Stephanie discussed the methods she is using to attempt to maintain emotion regulation. She states that she is working on increasing her use of mindfulness-based activities. We also discussed the direct correlation to her socialization activities and her positive moods. She has identified that when she engages in socialization her moods are significantly improved. She has attended [employer] with her mother and has found this to be a fun activity. She has also attended some holistic expos in the area. Stephanie will continue to seek options for social activities, as the weather improves. Stephanie discussed her goals re: saving money, and we discussed learning about budgeting for individuals who are on SSI benefits, so she can maintain her entitlements and medicaid benefits. She will look into educational resources to make sure she understands the system. Stephanie discussed her grief response re: her grandmother's death. She will continue to address these issues in her sessions.   During this session, this clinician used the following therapeutic modalities: Bereavement Therapy, Client-centered Therapy, Dialectical Behavior Therapy, Mindfulness-based Strategies, Motivational Interviewing, Solution-Focused Therapy, and Supportive  "Psychotherapy    Substance Abuse was not addressed during this session. If the client is diagnosed with a co-occurring substance use disorder, please indicate any changes in the frequency or amount of use: n/a. Stage of change for addressing substance use diagnoses: No substance use/Not applicable    ASSESSMENT:  Cruz presents with a Euthymic/ normal mood. Jomars affect is Normal range and intensity, which is congruent, with their mood and the content of the session. The client has made progress on their goals as evidenced by her increased socialization activities.    Cruz presents with a minimal risk of suicide, minimal risk of self-harm, and minimal risk of harm to others.    For any risk assessment that surpasses a \"low\" rating, a safety plan must be developed.    A safety plan was indicated: no  If yes, describe in detail n/a    PLAN: Between sessions, Cruz will continue to monitor her moods. She will continue to work on increasing socialization and increasing her engagement in activities that bring her ciara. At the next session, the therapist will use Client-centered Therapy, Dialectical Behavior Therapy, Mindfulness-based Strategies, Motivational Interviewing, Solution-Focused Therapy, and Supportive Psychotherapy to address her mood regulation and relationships.    Behavioral Health Treatment Plan [facility]: Diagnosis and Treatment Plan explained to Cruz, Cruz relates understanding diagnosis and is agreeable to Treatment Plan. Yes     Depression Follow-up Plan Completed: Yes     Reason for visit is No chief complaint on file.     Recent Visits  No visits were found meeting these conditions.  Showing recent visits within past 7 days and meeting all other requirements  Future Appointments  No visits were found meeting these conditions.  Showing future appointments within next 150 days and meeting all other requirements     History of Present Illness     HPI    Past Medical History "   Past Medical History:   Diagnosis Date    Abnormal Pap smear of cervix     Allergic     Anxiety     Arthritis     Dental caries     Depression     Fibromyalgia, primary     GERD (gastroesophageal reflux disease)     IBS (irritable bowel syndrome)     Migraine     Obesity     Vitamin B12 deficiency      Past Surgical History:   Procedure Laterality Date    COLONOSCOPY N/A 5/8/2018    Procedure: COLONOSCOPY;  Surgeon: Stephanie Alston MD;  Location: [facility];  Service: Gastroenterology    IL EXC B9 LESION MRGN XCP SK TG S/N/H/F/G > 4.0CM N/A 7/1/2021    Procedure: EXCISION OF PILAR SCALP CYST X 2 AND RIGHT INNER GROIN NEVVUS;  Surgeon: Denis Barron MD;  Location:  MAIN OR;  Service: Plastics    IL LAPS SURG CHOLECYSTECTOMY W/CHOLANGIOGRAPHY N/A 12/26/2024    Procedure: CHOLECYSTECTOMY LAPAROSCOPIC;  Surgeon: Kulwinder Nunn MD;  Location: [facility];  Service: General    IL REPAIR COMPLEX SCALP/ARM/LEG 2.6-7.5 CM N/A 7/1/2021    Procedure: CLOSURE WOUND SCALP X 2 AND RIGHT INNER GROIN;  Surgeon: Denis Barron MD;  Location:  MAIN OR;  Service: Plastics    TONSILLECTOMY      WISDOM TOOTH EXTRACTION       Current Outpatient Medications   Medication Instructions    acetaminophen (TYLENOL) 500 mg, Every 6 hours PRN    ALPRAZolam (XANAX) 0.5 mg, Oral, 3 times daily PRN    ascorbic acid (VITAMIN C) 500 mg, Daily    bisacodyl (DULCOLAX) 5 mg EC tablet Take as directed by GI office    Botox 200 units SOLR     buPROPion (WELLBUTRIN XL) 300 mg, Oral, Daily    cyanocobalamin 1,000 mcg/mL 1 IM injection every month    dihydroergotamine (MIGRANAL) 4 MG/ML nasal spray 1 spray, Nasal, As needed, Use in one nostril as directed.  No more than 4 sprays in one hour    diphenoxylate-atropine (LOMOTIL) 2.5-0.025 mg per tablet 1 tablet, Oral, 4 times daily PRN    escitalopram (LEXAPRO) 20 mg, Oral, Daily    etonogestrel-ethinyl estradiol (NuvaRing) 0.12-0.015 MG/24HR vaginal ring Insert ring for 21 days then remove for one  week.    FeroSul 325 mg, Oral, Daily    gabapentin (NEURONTIN) 300 mg, Oral, 3 times daily    Galcanezumab-gnlm (Emgality) 120 MG/ML SOAJ Inject as directed 1 ml Subcutaneous  every 30 days    indomethacin (INDOCIN) 25 mg capsule 1-2 tabs BID prn severe headache with food. Hold toradol.    magnesium Oxide (MAG-OX) 400 mg, Oral, Daily    meloxicam (MOBIC) 15 mg, Oral, Daily PRN    methocarbamol (ROBAXIN) 500 mg, Oral, 3 times daily    mupirocin (BACTROBAN) 2 % cream Apply bid to areas needed    mupirocin (BACTROBAN) 2 % ointment Topical, 3 times daily    nystatin (MYCOSTATIN) cream Topical, 2 times daily    nystatin-triamcinolone (MYCOLOG-II) cream Topical, 4 times daily    OLANZapine (ZYPREXA) 7.5 mg, Oral, Daily at bedtime, Do not take with reglan.    ondansetron (ZOFRAN) 4 mg, Oral, Every 6 hours    polyethylene glycol (MIRALAX) 17 g, As needed    pramipexole (MIRAPEX) 0.25 mg, Oral, Daily at bedtime    prazosin (MINIPRESS) 2 mg, Oral, Daily at bedtime    RABEprazole (ACIPHEX) 20 mg, Oral, 2 times daily    sodium chloride (OCEAN) 0.65 % nasal spray 1 spray, Nasal, As needed    Trudhesa 0.725 MG/ACT AERS 1 spray in each nostril for total dose of 1.45mg, may repeat 1 dose after 1 hour. Max 2 doses per 24 hours and 3 doses per week.    Vitamin D3 2,000 Units, Oral, Daily    Wegovy 0.25 mg, Subcutaneous, Weekly, Inject 0.25 mg under the skin weekly     Allergies   Allergen Reactions    Cimzia [Certolizumab Pegol] Swelling     Face and hands    Desvenlafaxine Other (See Comments)     Other reaction(s): state of confusion    Ixekizumab Vomiting    Seasonal Ic [Octacosanol] Nasal Congestion    Tizanidine Anxiety     Panic attacks       Objective   There were no vitals taken for this visit.    Video Exam  Physical Exam     Administrative Statements   Encounter provider APARNA Fox    The Patient is located at Home and in the following state in which I hold an active license [place].    The patient was identified by  name and date of birth. Cruz Schneider was informed that this is a telemedicine visit and that the visit is being conducted through the Epic Embedded platform. She agrees to proceed..  My office door was closed. No one else was in the room.  She acknowledged consent and understanding of privacy and security of the video platform. The patient has agreed to participate and understands they can discontinue the visit at any time.      Visit Time  Start Time: 0800  Stop Time: 0857  Total Visit Time: 57 minutes

## 2025-03-27 ENCOUNTER — EVALUATION (OUTPATIENT)
Dept: PHYSICAL THERAPY | Age: 35
End: 2025-03-27
Payer: COMMERCIAL

## 2025-03-27 ENCOUNTER — TELEPHONE (OUTPATIENT)
Dept: PSYCHIATRY | Facility: CLINIC | Age: 35
End: 2025-03-27

## 2025-03-27 DIAGNOSIS — M79.18 MYOFASCIAL PAIN: Primary | ICD-10-CM

## 2025-03-27 DIAGNOSIS — M25.532 LEFT WRIST PAIN: Primary | ICD-10-CM

## 2025-03-27 DIAGNOSIS — H92.02 LEFT EAR PAIN: ICD-10-CM

## 2025-03-27 PROCEDURE — 97140 MANUAL THERAPY 1/> REGIONS: CPT

## 2025-03-27 PROCEDURE — 97110 THERAPEUTIC EXERCISES: CPT

## 2025-03-27 PROCEDURE — 97162 PT EVAL MOD COMPLEX 30 MIN: CPT

## 2025-03-28 NOTE — PROGRESS NOTES
PT Evaluation     Today's date: 3/27/2025  Patient name: Cruz Schneider  : 1990  MRN: [id number]  Referring provider: Tanisha Newman P*  Dx:   Encounter Diagnosis     ICD-10-CM    1. Myofascial pain  M79.18 Ambulatory referral to Physical Therapy      2. Left ear pain  H92.02 Ambulatory referral to Physical Therapy                     Assessment/Plan    Subjective Evaluation    History of Present Illness  Onset date: onset left sided myofascial pain and left ear pain onset 2 years ago with increased stress noted.          Recurrent probem    Quality of life: good      Objective           Precautions: allergies  Reviewed by You at 11:40 PM   Severity Reactions Comments   Cimzia [certolizumab Pegol] High Swelling Face and hands   Desvenlafaxine Medium Other (See Comments) Other reaction(s): state of confusion   Ixekizumab Medium Vomiting    Seasonal Ic [octacosanol] Low Nasal Congestion    Tizanidine Low Anxiety Panic attacks         Manuals 3/27/25             Ieval             Graston to left myofascial facial musculature              Suboccipital release, man neck traction  man                         Neuro Re-Ed             Rocabado 6 x 6 5 of 6 exer 6 reps each            Upper trap stretch             Levator stretch             Scm stretch             Ant scalene stretch             Foam roll self thoracic mobilization                           Ther Ex                                                                                                                     Ther Activity                                                                              Modalities             Mh facial musculature prn                                 "mouth opening on left by 75 percent in 4-6 weeks  4. Reduction of trigger points periscapular and cervical region to wtl's in 4-6 weeks   5 consistent use of proper body mechanics with daily activity in 6-8 weeks       Plan  Patient would benefit from: skilled physical therapy and PT eval  Referral necessary: Yes  Other planned modality interventions: prn    Planned therapy interventions: kinesiology taping, massage, manual therapy, joint mobilization, IASTM, strengthening, stretching, therapeutic activities, therapeutic exercise, home exercise program, patient/caregiver education and neuromuscular re-education  Other planned therapy interventions: myofascial release techniques, intra oral and external , graston soft tissue mobilization,    Frequency: 2x week  Plan of Care beginning date: 3/27/2025  Plan of Care expiration date: 2025  Treatment plan discussed with: patient    Subjective Evaluation    History of Present Illness  Onset date: onset left sided myofascial pain and left ear pain onset 2 years ago with increased stress noted.  Mechanism of injury: Stephanie Schneider is a 34 year old female referred to outpt PT with a diagnosis of myofascial pain syndrome with c/o 2 years of left ear and facial pain left greater than right with increased stress noted.  PMH is signficant ( see also PMH). She report chronic cervical pain and bilateral facial tightness with mouth opening and pain with yawning , chewing, and prolonged talking. There is also report of an audible \"pop\" and cracking upon mouth opening.           Recurrent probem    Quality of life: good    Patient Goals  Patient goals for therapy: decreased pain, increased motion, increased strength, independence with ADLs/IADLs and return to sport/leisure activities  Patient goal: to be able to open her mouth full range and talk without pain, reduce cervical pain  Pain  Current pain ratin  At best pain ratin  At worst pain rating: 3  Location: left " facial pain greate than right pain also into left temporal region , and along mandible  Quality: dull ache, tight, pulling, pressure and radiating  Relieving factors: change in position, heat and rest  Aggravating factors: eating (yawning , talking)  Progression: worsening    Social Support  Lives with: parents (mother)    Employment status: not working (on disabilitiy for migraines and fibromyalgia)  Exercise history: sedentary  Stress factors comments: much sleeping and some art.    Treatments  Previous treatment: physical therapy  Current treatment: physical therapy    Objective     Active Range of Motion   Cervical/Thoracic Spine       Cervical  Subcranial protraction:  WFL   Subcranial retraction: Active cervical subcranial retraction: 3/4 range subocciptial region.  with pain   Flexion: Neck active flexion: 3 cm chin to sternal notch.  with pain  Extension: Neck active extension: 17 cm chin to sternal notch.      Left lateral flexion: Neck active lateral bend left: 12cm earlobe to acromion.      Right lateral flexion: Neck active lateral bend right: 13cm earlobe to acromion.      Left rotation: Neck active rotation left: 13cm chin to acromion. with pain  Right rotation: Neck active rotation right: 12cm chin to acromion.    with pain    Additional Active Range of Motion Details  Mouth opening  35 cm with left sided deviation ,  left lateral movement 7cm  pain on left  right lateral movement 3mm,        Pt with overbite 3 mm  c/o bilateral temporal headaches  today at level 1   Audible pop  on left with mouth opening noted     C/o pain with chewing , yawning , overhead reaching and lifting     Periscapular trigger points noted,  ant scalene and scm tightness noted bilaterally     Suboccipital and temporal headaches.    Strength/Myotome Testing     Additional Strength Details  Cervical mmt 5/5 with exception of axial extension 4/5 mmt    Tongue strength 5/5 mmt buccinator 5/5     Ambulation     Ambulation: Level  Surfaces   Ambulation with assistive device: independent    Additional Level Surfaces Ambulation Details  +250 feet   pt reports not exercising at this time but would like to improve her activity levels      PMH: myofascial pain syndrome,  fibromyalgia, chronic migraine headaches ( pt does receive botox injections every 2-3 months), facial pain , left ear pain, cervical radiculitis, lumbar radiculopathy, vit B12 and D deficiency, ant chest wall pain, costochondritis, restless leg syndrome, intertrigo, IBS, prediabetes, physiological tremors, basilar migraine,ankylosing spondylitis, HTN, blurred vision, hyper insulinemia, GERD, general pruritus, biliary dyskinesia, 12/26/24 cholecystectomy, tonsillectomy, chronic fatigue, memory loss , cognitive decline, depression, BESSY, chronic left heel pain, irritant contact dermatitis, arthralgia multisite, chronic low back pain, thoracic pain, TMJ dysfunctional syndrome,        Precautions: allergies  Reviewed by You at 11:40 PM   Severity Reactions Comments   Cimzia [certolizumab Pegol] High Swelling Face and hands   Desvenlafaxine Medium Other (See Comments) Other reaction(s): state of confusion   Ixekizumab Medium Vomiting    Seasonal Ic [octacosanol] Low Nasal Congestion    Tizanidine Low Anxiety Panic attacks         Manuals 3/27/25            Intraoral trigger point release prn by PT in future  Ieval             Graston to left myofascial facial musculature              Suboccipital release, man neck traction  man            Trigger point release periscapular region sitting or prone              Neuro Re-Ed             Rocabado 6 x 6 5 of 6 exer 6 reps each pt has handout            Upper trap stretch             Levator stretch             Scm stretch             Ant scalene stretch in supine             Foam roll self thoracic mobilization              Sh ext , rows  and trial Y abd theraband in standing              Ther Ex             Corner pect stretch              Ube alt fwd, bwd             Scapular squeezes             Sh backward rolls                                                                 Ther Activity                                                                              Modalities             Mh facial musculature prn  no charge

## 2025-03-31 ENCOUNTER — OFFICE VISIT (OUTPATIENT)
Dept: PHYSICAL THERAPY | Age: 35
End: 2025-03-31
Payer: COMMERCIAL

## 2025-03-31 DIAGNOSIS — H92.02 LEFT EAR PAIN: Primary | ICD-10-CM

## 2025-03-31 DIAGNOSIS — M79.18 MYOFASCIAL PAIN: ICD-10-CM

## 2025-03-31 DIAGNOSIS — M79.7 FIBROMYALGIA: ICD-10-CM

## 2025-03-31 PROCEDURE — 97110 THERAPEUTIC EXERCISES: CPT | Performed by: PHYSICAL THERAPY ASSISTANT

## 2025-03-31 PROCEDURE — 97140 MANUAL THERAPY 1/> REGIONS: CPT | Performed by: PHYSICAL THERAPY ASSISTANT

## 2025-03-31 NOTE — PROGRESS NOTES
"Daily Note     Today's date: 3/31/2025  Patient name: Cruz Schneider  : 1990  MRN: [id number]  Referring provider: Tanisha Newman P*  Dx:   Encounter Diagnosis     ICD-10-CM    1. Left ear pain  H92.02       2. Myofascial pain  M79.18       3. Fibromyalgia  M79.7                      Subjective: Pt reports she is having a tough day over all with pain due to the weather. Also reports she is doing her HEP 3x per day rather than the recommended 6x per day as she felt doing them 6x per day was aggravating her sxs.       Objective: See treatment diary below      Assessment: Tolerated treatment well. Added stretches and UBE to program as planned with good tolerance. Pt demonstrated good technique with review of HEP.  Encouraged pt to continue with HEP and work up to performing 6x/day as prescribed.  Patient demonstrated fatigue post treatment, exhibited good technique with therapeutic exercises, and would benefit from continued PT      Plan: Continue per plan of care.  Progress treatment as tolerated.       Precautions: allergies  Reviewed by You at 11:40 PM   Severity Reactions Comments   Cimzia [certolizumab Pegol] High Swelling Face and hands   Desvenlafaxine Medium Other (See Comments) Other reaction(s): state of confusion   Ixekizumab Medium Vomiting    Seasonal Ic [octacosanol] Low Nasal Congestion    Tizanidine Low Anxiety Panic attacks         Manuals 3/27/25 3/31           Intraoral trigger point release prn by PT in future  Ieval             Graston to left myofascial facial musculature              Suboccipital release, man neck traction  man RK           Trigger point release periscapular region sitting or prone              Neuro Re-Ed             Rocabado 6 x 6 5 of 6 exer 6 reps each pt has handout Reviewed HEP           Upper trap stretch  15\"x3 ea           Levator stretch  15\"x3 ea           Scm stretch             Ant scalene stretch in supine             Foam roll self thoracic " "mobilization              Sh ext , rows  and trial Y abd theraband in standing              Ther Ex             Corner pect stretch  15\"x3           Ube alt fwd, bwd  3'/3'           Scapular squeezes  x10           Sh backward rolls  x10                                                               Ther Activity                                                                              Modalities             Mh facial musculature prn  no charge  10'                             "

## 2025-04-01 ENCOUNTER — OFFICE VISIT (OUTPATIENT)
Dept: GASTROENTEROLOGY | Facility: MEDICAL CENTER | Age: 35
End: 2025-04-01
Payer: COMMERCIAL

## 2025-04-01 VITALS
DIASTOLIC BLOOD PRESSURE: 84 MMHG | OXYGEN SATURATION: 98 % | TEMPERATURE: 97.8 F | WEIGHT: 244 LBS | HEART RATE: 85 BPM | BODY MASS INDEX: 43.23 KG/M2 | HEIGHT: 63 IN | SYSTOLIC BLOOD PRESSURE: 123 MMHG

## 2025-04-01 DIAGNOSIS — K21.9 GASTROESOPHAGEAL REFLUX DISEASE WITHOUT ESOPHAGITIS: Primary | ICD-10-CM

## 2025-04-01 DIAGNOSIS — R19.7 DIARRHEA, UNSPECIFIED TYPE: ICD-10-CM

## 2025-04-01 DIAGNOSIS — R10.84 GENERALIZED ABDOMINAL PAIN: ICD-10-CM

## 2025-04-01 PROCEDURE — 99214 OFFICE O/P EST MOD 30 MIN: CPT | Performed by: NURSE PRACTITIONER

## 2025-04-01 NOTE — ASSESSMENT & PLAN NOTE
Reflux controlled with rabeprazole 20 mg twice daily.  If she misses a dose her reflux returns.  Recent EGD was normal 11/24.  Denies any nausea, vomiting or dysphagia.  She recently started Wegovy.  Denies any GI complaints since starting semaglutide.    -Continue rabeprazole 20 mg twice daily  -Antireflux diet  -Follow-up in office in 6 months or sooner

## 2025-04-01 NOTE — PROGRESS NOTES
Name: Cruz Schneider      : 1990      MRN: [id number]  Encounter Provider: TENZIN Hernandez  Encounter Date: 2025   Encounter department[facility]  :  Assessment & Plan  Gastroesophageal reflux disease without esophagitis  Reflux controlled with rabeprazole 20 mg twice daily.  If she misses a dose her reflux returns.  Recent EGD was normal .  Denies any nausea, vomiting or dysphagia.  She recently started Wegovy.  Denies any GI complaints since starting semaglutide.    -Continue rabeprazole 20 mg twice daily  -Antireflux diet  -Follow-up in office in 6 months or sooner       Generalized abdominal pain  Recent HIDA scan with [facility] noted an EF of 24%.  EGD was normal.  Patient underwent a laparoscopic cholecystectomy .  Reports abdominal pain has resolved since she had her surgery.       Diarrhea, unspecified type  Longstanding history of intermittent bouts of loose stools.  Recent colonoscopy was normal.  Biopsies negative for microscopic/collagenous colitis.  Reports her BMs are 2-3 times per day formed alternating with loose.  No melena or hematochezia.  No abdominal pain.    -Continue fiber supplement daily continue fiber supplement daily           History of Present Illness   Cruz Schneider is a 34 y.o. female who presents for follow-up.  She was last seen by myself  for abnormal bowel habits, GERD and epigastric pain.  HPI    Interval history: HIDA scan with [facility] 10/24 noted an ejection fraction of 24%.  Had her gallbladder removed  and feeling better overall since surgery.  She did undergo an EGD and colonoscopy .  Colonoscopy was normal and biopsies were negative for microscopic/collagenous colitis.  EGD at the same time noted 2 cm hiatal hernia otherwise normal exam.    Hemoglobin A1c 5.8, CMP normal, CRP 14.    Prior EGD/colonoscopy     EGD -2 cm hiatal hernia otherwise normal exam.    Colonoscopy  11/24-normal exam.  Biopsies negative for inflammatory bowel disease and microscopic colitis.     EGD 6/22-2 cm hiatal hernia otherwise normal.  Biopsies negative for celiac and H. pylori.     Colonoscopy 5 to 6 years ago-normal per patient.    Review of Systems   Constitutional:  Negative for chills and fever.   HENT:  Negative for ear pain and sore throat.    Eyes:  Negative for pain and visual disturbance.   Respiratory:  Negative for cough and shortness of breath.    Cardiovascular:  Negative for chest pain and palpitations.   Gastrointestinal:  Negative for abdominal pain and vomiting.   Genitourinary:  Negative for dysuria and hematuria.   Musculoskeletal:  Negative for arthralgias and back pain.   Skin:  Negative for color change and rash.   Neurological:  Negative for seizures and syncope.   All other systems reviewed and are negative.   A complete review of systems is negative other than that noted above in the HPI.      Current Outpatient Medications   Medication Sig Dispense Refill    acetaminophen (TYLENOL) 500 mg tablet Take 500 mg by mouth every 6 (six) hours as needed for mild pain (1-2 tablets)      ALPRAZolam (XANAX) 0.5 mg tablet Take 1 tablet (0.5 mg total) by mouth 3 (three) times a day as needed for anxiety Do not start before December 11, 2024. 90 tablet 2    ascorbic acid (VITAMIN C) 500 MG tablet Take 500 mg by mouth daily      bisacodyl (DULCOLAX) 5 mg EC tablet Take as directed by GI office (Patient not taking: Reported on 12/12/2024) 4 tablet 0    Botox 200 units SOLR       buPROPion (WELLBUTRIN XL) 300 mg 24 hr tablet Take 1 tablet (300 mg total) by mouth daily 90 tablet 0    Cholecalciferol (Vitamin D3) 50 MCG (2000 UT) capsule Take 1 capsule (2,000 Units total) by mouth daily 90 capsule 1    cyanocobalamin 1,000 mcg/mL 1 IM injection every month 3 mL 3    dihydroergotamine (MIGRANAL) 4 MG/ML nasal spray 1 spray into each nostril as needed for migraine Use in one nostril as directed.  No  more than 4 sprays in one hour 8 mL 0    diphenoxylate-atropine (LOMOTIL) 2.5-0.025 mg per tablet Take 1 tablet by mouth 4 (four) times a day as needed for diarrhea 30 tablet 0    escitalopram (LEXAPRO) 20 mg tablet Take 1 tablet (20 mg total) by mouth daily 90 tablet 0    etonogestrel-ethinyl estradiol (NuvaRing) 0.12-0.015 MG/24HR vaginal ring Insert ring for 21 days then remove for one week. 3 each 4    FeroSul 325 (65 Fe) MG tablet Take 1 tablet (325 mg total) by mouth in the morning 90 tablet 1    gabapentin (NEURONTIN) 300 mg capsule Take 1 capsule (300 mg total) by mouth 3 (three) times a day 90 capsule 5    Galcanezumab-gnlm (Emgality) 120 MG/ML SOAJ Inject as directed 1 ml Subcutaneous  every 30 days 1 mL 11    indomethacin (INDOCIN) 25 mg capsule 1-2 tabs BID prn severe headache with food. Hold toradol. 30 capsule 5    magnesium Oxide (MAG-OX) 400 mg TABS Take 1 tablet (400 mg total) by mouth daily (Patient not taking: Reported on 3/6/2025) 30 tablet 5    meloxicam (MOBIC) 15 mg tablet Take 1 tablet (15 mg total) by mouth daily as needed for moderate pain 30 tablet 0    methocarbamol (ROBAXIN) 500 mg tablet Take 1 tablet (500 mg total) by mouth 3 (three) times a day 270 tablet 1    mupirocin (BACTROBAN) 2 % cream Apply bid to areas needed 30 g 5    mupirocin (BACTROBAN) 2 % ointment Apply topically 3 (three) times a day 15 g 5    nystatin (MYCOSTATIN) cream Apply topically 2 (two) times a day 15 g 0    nystatin-triamcinolone (MYCOLOG-II) cream Apply topically 4 (four) times a day 60 g 2    OLANZapine (ZyPREXA) 7.5 mg tablet Take 1 tablet (7.5 mg total) by mouth daily at bedtime Do not take with reglan. 90 tablet 0    ondansetron (ZOFRAN) 4 mg tablet Take 1 tablet (4 mg total) by mouth every 6 (six) hours 30 tablet 2    polyethylene glycol (MIRALAX) 17 g packet Take 17 g by mouth if needed      pramipexole (MIRAPEX) 0.25 mg tablet Take 1 tablet (0.25 mg total) by mouth daily at bedtime 30 tablet 1     prazosin (MINIPRESS) 2 mg capsule Take 1 capsule (2 mg total) by mouth daily at bedtime 90 capsule 0    RABEprazole (ACIPHEX) 20 MG tablet Take 1 tablet by mouth twice daily 60 tablet 5    Semaglutide-Weight Management (Wegovy) 0.25 MG/0.5ML Inject 0.5 mL (0.25 mg total) under the skin once a week Inject 0.25 mg under the skin weekly 2 mL 0    sodium chloride (OCEAN) 0.65 % nasal spray 1 spray into each nostril as needed for congestion (dry nose) 30 mL 2    Trudhesa 0.725 MG/ACT AERS 1 spray in each nostril for total dose of 1.45mg, may repeat 1 dose after 1 hour. Max 2 doses per 24 hours and 3 doses per week. 12 mL 2     Current Facility-Administered Medications   Medication Dose Route Frequency Provider Last Rate Last Admin    cyanocobalamin injection 1,000 mcg  1,000 mcg Intramuscular Q30 Days Oni Beckham DO   1,000 mcg at 03/14/24 1450    cyanocobalamin injection 1,000 mcg  1,000 mcg Intramuscular Q30 Days    1,000 mcg at 11/21/24 1704     Objective   There were no vitals taken for this visit.    Physical Exam  Vitals and nursing note reviewed.   Constitutional:       General: She is not in acute distress.     Appearance: She is well-developed.   HENT:      Head: Normocephalic and atraumatic.   Eyes:      Conjunctiva/sclera: Conjunctivae normal.   Cardiovascular:      Rate and Rhythm: Normal rate and regular rhythm.      Heart sounds: No murmur heard.  Pulmonary:      Effort: Pulmonary effort is normal. No respiratory distress.      Breath sounds: Normal breath sounds.   Abdominal:      General: Bowel sounds are normal.      Palpations: Abdomen is soft.      Tenderness: There is no abdominal tenderness.   Musculoskeletal:         General: No swelling.      Cervical back: Neck supple.   Skin:     General: Skin is warm and dry.      Capillary Refill: Capillary refill takes less than 2 seconds.   Neurological:      Mental Status: She is alert and oriented to person, place, and time.   Psychiatric:         Mood  and Affect: Mood normal.            Lab Results: I personally reviewed relevant lab results.       Results for orders placed during the hospital encounter of 11/01/24    Colonoscopy    Impression  The terminal ileum and entire colon appeared normal.  Performed forceps biopsies in the terminal ileum to rule out IBD  Performed pancolonic forceps biopsies to rule out IBD        RECOMMENDATION:  Await pathology results  No further screening colonoscopies necessary  Not recommended at this time due to age (patient below screening age).  Plan for age-appropriate colorectal cancer screening.    Follow up with GI Clinic  Consider course of rifaximin for possible IBS-diarrhea/SIBO            Benitez Whitney DO

## 2025-04-07 ENCOUNTER — OFFICE VISIT (OUTPATIENT)
Age: 35
End: 2025-04-07
Payer: COMMERCIAL

## 2025-04-07 ENCOUNTER — APPOINTMENT (OUTPATIENT)
Dept: PHYSICAL THERAPY | Age: 35
End: 2025-04-07
Payer: COMMERCIAL

## 2025-04-07 VITALS
OXYGEN SATURATION: 99 % | HEIGHT: 63 IN | WEIGHT: 245 LBS | DIASTOLIC BLOOD PRESSURE: 88 MMHG | HEART RATE: 95 BPM | TEMPERATURE: 97.4 F | SYSTOLIC BLOOD PRESSURE: 130 MMHG | BODY MASS INDEX: 43.41 KG/M2

## 2025-04-07 DIAGNOSIS — R79.82 ELEVATED C-REACTIVE PROTEIN: ICD-10-CM

## 2025-04-07 DIAGNOSIS — I10 HYPERTENSION, UNSPECIFIED TYPE: ICD-10-CM

## 2025-04-07 DIAGNOSIS — L30.1 DYSHIDROTIC ECZEMA: ICD-10-CM

## 2025-04-07 DIAGNOSIS — J02.0 PHARYNGITIS DUE TO STREPTOCOCCUS SPECIES: ICD-10-CM

## 2025-04-07 DIAGNOSIS — E55.9 VITAMIN D DEFICIENCY: Primary | ICD-10-CM

## 2025-04-07 DIAGNOSIS — G43.709 CHRONIC MIGRAINE WITHOUT AURA WITHOUT STATUS MIGRAINOSUS, NOT INTRACTABLE: ICD-10-CM

## 2025-04-07 DIAGNOSIS — E53.8 B12 DEFICIENCY: ICD-10-CM

## 2025-04-07 PROCEDURE — 99214 OFFICE O/P EST MOD 30 MIN: CPT | Performed by: FAMILY MEDICINE

## 2025-04-07 RX ORDER — ACETAMINOPHEN 160 MG
2000 TABLET,DISINTEGRATING ORAL DAILY
Qty: 90 CAPSULE | Refills: 1 | Status: SHIPPED | OUTPATIENT
Start: 2025-04-07

## 2025-04-07 RX ORDER — AZITHROMYCIN 250 MG/1
TABLET, FILM COATED ORAL
Qty: 6 TABLET | Refills: 0 | Status: SHIPPED | OUTPATIENT
Start: 2025-04-07 | End: 2025-04-11

## 2025-04-07 NOTE — PROGRESS NOTES
Name: Cruz Schneider      : 1990      MRN: [id number]  Encounter Provider: Adan Beckham DO  Encounter Date: 2025   Encounter department: [facility]  :  Assessment & Plan  Hypertension, unspecified type  Continue low-salt diet for blood pressure and check labs  Orders:    Cholecalciferol (Vitamin D3) 50 MCG (2000 UT) capsule; Take 1 capsule (2,000 Units total) by mouth daily    CBC and differential; Future    Comprehensive metabolic panel; Future    TSH, 3rd generation with Free T4 reflex; Future    Lipid panel; Future    Vitamin D 25 hydroxy; Future    Vitamin B12; Future    Magnesium; Future    Cortisol Level, AM Specimen; Future    TIBC Panel (incl. Iron, TIBC, % Iron Saturation); Future    Chronic migraine without aura without status migrainosus, not intractable  Patient seeing neurology tomorrow.  Orders:    CBC and differential; Future    Comprehensive metabolic panel; Future    TSH, 3rd generation with Free T4 reflex; Future    Lipid panel; Future    Vitamin D 25 hydroxy; Future    Vitamin B12; Future    Magnesium; Future    Cortisol Level, AM Specimen; Future    TIBC Panel (incl. Iron, TIBC, % Iron Saturation); Future    Vitamin D deficiency  Refills given.  Check labs.    Orders:    Vitamin D 25 hydroxy; Future    B12 deficiency  Recheck laboratory study.    Orders:    Vitamin B12; Future    Elevated C-reactive protein    Orders:    CBC and differential; Future    Comprehensive metabolic panel; Future    TSH, 3rd generation with Free T4 reflex; Future    Lipid panel; Future    Vitamin D 25 hydroxy; Future    Vitamin B12; Future    Magnesium; Future    Cortisol Level, AM Specimen; Future    C-reactive protein; Future    Dyshidrotic eczema    Orders:    Ambulatory Referral to Dermatology; Future    Pharyngitis due to Streptococcus species  Z-Walt as directed.    Orders:    azithromycin (ZITHROMAX) 250 mg tablet; Take 2 tablets today then 1 tablet daily x 4 days          "  History of Present Illness   Patient is here with sore throat and chills since yesterday.  Patient also with rash on bilateral hands.  No fever noted.  Patient has been on vitamins.  Patient wishes to check labs after restarting vitamins.    Sore Throat   Pertinent negatives include no abdominal pain, coughing, ear pain, shortness of breath or vomiting.   Rash  Associated symptoms include a sore throat. Pertinent negatives include no cough, fever, shortness of breath or vomiting.     Review of Systems   Constitutional:  Positive for chills. Negative for fever.   HENT:  Positive for sore throat. Negative for ear pain.    Eyes:  Negative for pain and visual disturbance.   Respiratory:  Negative for cough and shortness of breath.    Cardiovascular:  Negative for chest pain and palpitations.   Gastrointestinal:  Negative for abdominal pain and vomiting.   Genitourinary:  Negative for dysuria and hematuria.   Musculoskeletal:  Negative for arthralgias and back pain.   Skin:  Positive for rash. Negative for color change.   Neurological:  Negative for seizures and syncope.   All other systems reviewed and are negative.      Objective   /88 (BP Location: Left arm, Patient Position: Sitting, Cuff Size: Large)   Pulse 95   Temp (!) 97.4 °F (36.3 °C) (Temporal)   Ht 5' 3\" (1.6 m)   Wt 111 kg (245 lb)   SpO2 99%   BMI 43.40 kg/m²      Physical Exam  Vitals and nursing note reviewed.   Constitutional:       General: She is not in acute distress.     Appearance: Normal appearance. She is well-developed. She is not ill-appearing, toxic-appearing or diaphoretic.   HENT:      Head: Normocephalic and atraumatic.      Right Ear: Tympanic membrane, ear canal and external ear normal.      Left Ear: Tympanic membrane, ear canal and external ear normal.      Nose: Nose normal. No congestion or rhinorrhea.      Mouth/Throat:      Mouth: Mucous membranes are moist.      Pharynx: Posterior oropharyngeal erythema present. No " oropharyngeal exudate.   Eyes:      General:         Right eye: No discharge.         Left eye: No discharge.   Neck:      Thyroid: No thyromegaly.      Vascular: No carotid bruit.      Trachea: No tracheal deviation.   Cardiovascular:      Rate and Rhythm: Normal rate and regular rhythm.      Pulses: Normal pulses.      Heart sounds: Normal heart sounds. No murmur heard.     No gallop.   Pulmonary:      Effort: Pulmonary effort is normal. No respiratory distress.      Breath sounds: Normal breath sounds. No stridor. No wheezing or rales.   Chest:      Chest wall: No tenderness.   Musculoskeletal:         General: No tenderness or deformity. Normal range of motion.      Cervical back: Normal range of motion and neck supple.      Right lower leg: No edema.      Left lower leg: No edema.   Lymphadenopathy:      Cervical: No cervical adenopathy.   Skin:     General: Skin is warm and dry.      Capillary Refill: Capillary refill takes less than 2 seconds.      Coloration: Skin is not pale.      Findings: Rash present. No erythema.   Neurological:      Mental Status: She is alert and oriented to person, place, and time. Mental status is at baseline.      Cranial Nerves: No cranial nerve deficit.      Motor: No abnormal muscle tone.      Coordination: Coordination normal.      Gait: Gait normal.      Deep Tendon Reflexes: Reflexes normal.   Psychiatric:         Mood and Affect: Mood normal.         Behavior: Behavior normal.         Thought Content: Thought content normal.         Judgment: Judgment normal.

## 2025-04-07 NOTE — ASSESSMENT & PLAN NOTE
Orders:    CBC and differential; Future    Comprehensive metabolic panel; Future    TSH, 3rd generation with Free T4 reflex; Future    Lipid panel; Future    Vitamin D 25 hydroxy; Future    Vitamin B12; Future    Magnesium; Future    Cortisol Level, AM Specimen; Future    C-reactive protein; Future

## 2025-04-07 NOTE — ASSESSMENT & PLAN NOTE
Continue low-salt diet for blood pressure and check labs  Orders:    Cholecalciferol (Vitamin D3) 50 MCG (2000 UT) capsule; Take 1 capsule (2,000 Units total) by mouth daily    CBC and differential; Future    Comprehensive metabolic panel; Future    TSH, 3rd generation with Free T4 reflex; Future    Lipid panel; Future    Vitamin D 25 hydroxy; Future    Vitamin B12; Future    Magnesium; Future    Cortisol Level, AM Specimen; Future    TIBC Panel (incl. Iron, TIBC, % Iron Saturation); Future

## 2025-04-07 NOTE — ASSESSMENT & PLAN NOTE
Patient seeing neurology tomorrow.  Orders:    CBC and differential; Future    Comprehensive metabolic panel; Future    TSH, 3rd generation with Free T4 reflex; Future    Lipid panel; Future    Vitamin D 25 hydroxy; Future    Vitamin B12; Future    Magnesium; Future    Cortisol Level, AM Specimen; Future    TIBC Panel (incl. Iron, TIBC, % Iron Saturation); Future

## 2025-04-07 NOTE — ASSESSMENT & PLAN NOTE
Ciara as directed.    Orders:    azithromycin (ZITHROMAX) 250 mg tablet; Take 2 tablets today then 1 tablet daily x 4 days

## 2025-04-09 ENCOUNTER — TELEPHONE (OUTPATIENT)
Age: 35
End: 2025-04-09

## 2025-04-09 ENCOUNTER — TELEMEDICINE (OUTPATIENT)
Dept: NEUROLOGY | Facility: CLINIC | Age: 35
End: 2025-04-09
Payer: COMMERCIAL

## 2025-04-09 DIAGNOSIS — E53.8 B12 DEFICIENCY: ICD-10-CM

## 2025-04-09 DIAGNOSIS — G43.709 CHRONIC MIGRAINE WITHOUT AURA WITHOUT STATUS MIGRAINOSUS, NOT INTRACTABLE: Primary | ICD-10-CM

## 2025-04-09 PROCEDURE — 99213 OFFICE O/P EST LOW 20 MIN: CPT | Performed by: PHYSICIAN ASSISTANT

## 2025-04-09 RX ORDER — LANOLIN ALCOHOL/MO/W.PET/CERES
400 CREAM (GRAM) TOPICAL DAILY
Qty: 90 TABLET | Refills: 2 | Status: SHIPPED | OUTPATIENT
Start: 2025-04-09 | End: 2025-10-06

## 2025-04-09 RX ORDER — KETOROLAC TROMETHAMINE 10 MG/1
10 TABLET, FILM COATED ORAL EVERY 6 HOURS PRN
Qty: 30 TABLET | Refills: 0 | Status: SHIPPED | OUTPATIENT
Start: 2025-04-09

## 2025-04-09 NOTE — PROGRESS NOTES
Ambulatory Visit  Name: Cruz Schneider      : 1990      MRN: [id number]  Encounter Provider: Cate Malave DO  Encounter Date: 4/10/2025   Encounter department: [facility]    Assessment & Plan  Fibromyalgia syndrome  Patient is a 34-year-old female presenting with diffuse arthralgias and myalgias.  Pain is worse with physical activity.  Patient has tenderness to palpation of multiple large muscle groups.  Patient also endorses fatigue, brain fog, word finding difficulties, anxiety.  Discussed with patient that symptoms seem most consistent with fibromyalgia at this point.  Patient did aqua therapy which did provide relief.  Patient states that she will use her [employer] pool during the summer months.  Discussed long-term management of fibromyalgia is through PCP.  - continue gabapentin per PCP        Ankylosing spondylitis of multiple sites in spine (HCC)  Patient was previously diagnosed with ankylosing spondylitis due to low back pain. Patient was on multiple biologic therapy without any benefit. Patient was unresponsive to NSAIDs. Denies having morning stiffness, the pain typically lasts all day.  Patient underwent an MRI of the pelvis without any signs of sacroiliitis. Currently low clinical suspicion for inflammatory back pain. Less likely ankylosing spondylitis and will continue to monitor off of therapy. No dactylitis or enthesitis on exam.        RTC in 1 year     History of Present Illness     Cruz Schneider is a 34 y.o. female with a history of fibromyalgia, anxiety, depression, B12 deficiency, GERD, IBS, migraines, hypertension who presents for follow-up of spondyloarthropathy.    History obtained from : patient    No major changes in symptoms since last visit.  Continues to have pain in the bilateral rib area.  No shortness of breath.  No prolonged morning stiffness or joint swelling.  Patient states she finished her aqua therapy sessions.  Reports when she was  doing it she had significant improvement.    Permanent history:  Per chart review, patient has been following with [facility] rheumatology and diagnosed with inflammatory spondyloarthropathy around 2017.  Patient has been on multiple medications in the past that had to be discontinued due to side effects including hydroxychloroquine (loose bowel movements), sulfasalazine (headaches), Humira, Enbrel, Cimzia (face, ankle and finger swelling), Taltz (swelling, nausea), Xeljanz (headaches and nausea) and Rinvoq (abdominal pain and nausea) which all had to be discontinued due to side effects.  Patient then started on Remicade 5/24/2023 however started feeling unwell (flushing, flu-like symptoms) on that medication as well and it was discontinued.  Patient gets multiple flareups that respond to prednisone tapers.  Has tried Imuran in the past which did not help.  - 10/7/24: established care with me  Patient is a 34-year-old female presenting to establish car for ankylosing spondylitis.  Patient  was diagnosed around 2017 when she developed ankle pain and concerns for inflammatory back pain.  Patient has been on multiple biologic therapy in the past and has been unable to tolerated due to side effects.  Has not noticed a significant difference in symptoms while on any therapy.  Has been unresponsive to NSAIDs.  Patient reports worsening joint symptoms with physical activity.  Does endorse nighttime awakening due to back pain, however no morning stiffness.  Denies dactylitis.  Currently majority of patient's symptoms are diffuse and worse with use which I suspect are likely due to fibromyalgia. MRI of the SI was unremarkable and patient was monitored off of therapy. Recommended restarting Aqua therapy for FMS  - 12/10/24: No concerns for IA. Pain likely due to FMS. Start Aqua therapy       Family hx:  PsO and PsA in mother and grandmother     Review of Systems  Denies:  Fever  Rash  Oral/nasal/genital ulcers  Dry eyes,  dry mouth  Vision changes  Dysphagia/odynophagia  CP  MAURO  SOB at rest  Hematochezia  Gross hematuria  Muscle weakness   Dactylitis  Raynaud's  Joint issues other than noted above    Past Medical History:   Diagnosis Date    Abnormal Pap smear of cervix     Allergic     Anxiety     Arthritis     Dental caries     Depression     Fibromyalgia, primary     GERD (gastroesophageal reflux disease)     IBS (irritable bowel syndrome)     Migraine     Obesity     Vitamin B12 deficiency        Current Outpatient Medications on File Prior to Visit   Medication Sig Dispense Refill    acetaminophen (TYLENOL) 500 mg tablet Take 500 mg by mouth every 6 (six) hours as needed for mild pain (1-2 tablets)      ALPRAZolam (XANAX) 0.5 mg tablet Take 1 tablet (0.5 mg total) by mouth 3 (three) times a day as needed for anxiety Do not start before December 11, 2024. 90 tablet 2    ascorbic acid (VITAMIN C) 500 MG tablet Take 500 mg by mouth daily      Botox 200 units SOLR       buPROPion (WELLBUTRIN XL) 300 mg 24 hr tablet Take 1 tablet (300 mg total) by mouth daily 90 tablet 0    Cholecalciferol (Vitamin D3) 50 MCG (2000 UT) capsule Take 1 capsule (2,000 Units total) by mouth daily 90 capsule 1    cyanocobalamin 1,000 mcg/mL 1 IM injection every month 3 mL 3    dihydroergotamine (MIGRANAL) 4 MG/ML nasal spray 1 spray into each nostril as needed for migraine Use in one nostril as directed.  No more than 4 sprays in one hour 8 mL 0    escitalopram (LEXAPRO) 20 mg tablet Take 1 tablet (20 mg total) by mouth daily 90 tablet 0    etonogestrel-ethinyl estradiol (NuvaRing) 0.12-0.015 MG/24HR vaginal ring Insert ring for 21 days then remove for one week. 3 each 4    gabapentin (NEURONTIN) 300 mg capsule Take 1 capsule (300 mg total) by mouth 3 (three) times a day 90 capsule 5    Galcanezumab-gnlm (Emgality) 120 MG/ML SOAJ Inject as directed 1 ml Subcutaneous  every 30 days 1 mL 11    meloxicam (MOBIC) 15 mg tablet Take 1 tablet (15 mg total) by  mouth daily as needed for moderate pain 30 tablet 0    methocarbamol (ROBAXIN) 500 mg tablet Take 1 tablet (500 mg total) by mouth 3 (three) times a day 270 tablet 1    mupirocin (BACTROBAN) 2 % cream Apply bid to areas needed 30 g 5    nystatin (MYCOSTATIN) cream Apply topically 2 (two) times a day 15 g 0    OLANZapine (ZyPREXA) 7.5 mg tablet Take 1 tablet (7.5 mg total) by mouth daily at bedtime Do not take with reglan. 90 tablet 0    ondansetron (ZOFRAN) 4 mg tablet Take 1 tablet (4 mg total) by mouth every 6 (six) hours 30 tablet 2    polyethylene glycol (MIRALAX) 17 g packet Take 17 g by mouth if needed      pramipexole (MIRAPEX) 0.25 mg tablet Take 1 tablet (0.25 mg total) by mouth daily at bedtime 30 tablet 1    prazosin (MINIPRESS) 2 mg capsule Take 1 capsule (2 mg total) by mouth daily at bedtime 90 capsule 0    RABEprazole (ACIPHEX) 20 MG tablet Take 1 tablet by mouth twice daily 60 tablet 5    Semaglutide-Weight Management (Wegovy) 0.25 MG/0.5ML Inject 0.5 mL (0.25 mg total) under the skin once a week Inject 0.25 mg under the skin weekly 2 mL 0    sodium chloride (OCEAN) 0.65 % nasal spray 1 spray into each nostril as needed for congestion (dry nose) 30 mL 2    Trudhesa 0.725 MG/ACT AERS 1 spray in each nostril for total dose of 1.45mg, may repeat 1 dose after 1 hour. Max 2 doses per 24 hours and 3 doses per week. 12 mL 2    azithromycin (ZITHROMAX) 250 mg tablet Take 2 tablets today then 1 tablet daily x 4 days (Patient not taking: Reported on 4/10/2025) 6 tablet 0    bisacodyl (DULCOLAX) 5 mg EC tablet Take as directed by GI office (Patient not taking: Reported on 12/12/2024) 4 tablet 0    diphenoxylate-atropine (LOMOTIL) 2.5-0.025 mg per tablet Take 1 tablet by mouth 4 (four) times a day as needed for diarrhea (Patient not taking: Reported on 4/7/2025) 30 tablet 0    FeroSul 325 (65 Fe) MG tablet Take 1 tablet (325 mg total) by mouth in the morning (Patient not taking: Reported on 4/7/2025) 90 tablet 1  "   indomethacin (INDOCIN) 25 mg capsule 1-2 tabs BID prn severe headache with food. Hold toradol. (Patient not taking: Reported on 4/10/2025) 30 capsule 5    ketorolac (TORADOL) 10 mg tablet Take 1 tablet (10 mg total) by mouth every 6 (six) hours as needed (migraine) Max 2-3 per week. (Patient not taking: Reported on 4/10/2025) 30 tablet 0    magnesium Oxide (MAG-OX) 400 mg TABS Take 1 tablet (400 mg total) by mouth daily (Patient not taking: Reported on 4/10/2025) 90 tablet 2    mupirocin (BACTROBAN) 2 % ointment Apply topically 3 (three) times a day (Patient not taking: Reported on 4/10/2025) 15 g 5    nystatin-triamcinolone (MYCOLOG-II) cream Apply topically 4 (four) times a day (Patient not taking: Reported on 4/10/2025) 60 g 2     Current Facility-Administered Medications on File Prior to Visit   Medication Dose Route Frequency Provider Last Rate Last Admin    cyanocobalamin injection 1,000 mcg  1,000 mcg Intramuscular Q30 Days Oni Beckham, DO   1,000 mcg at 03/14/24 1450    cyanocobalamin injection 1,000 mcg  1,000 mcg Intramuscular Q30 Days    1,000 mcg at 11/21/24 1704      Social History     Tobacco Use    Smoking status: Never     Passive exposure: Past    Smokeless tobacco: Never   Vaping Use    Vaping status: Never Used   Substance and Sexual Activity    Alcohol use: Not Currently     Comment: rarely    Drug use: Yes     Types: Marijuana     Comment: medical marijuana -smokes flower    Sexual activity: Not Currently     Partners: Male     Birth control/protection: Ring     Comment: Nuva ring       Objective     BP (!) 180/104   Pulse 89   Ht 5' 3\" (1.6 m)   Wt 111 kg (245 lb 12.8 oz)   SpO2 99%   BMI 43.54 kg/m²     Physical Exam   General appearance: normal appearing, no acute distress  Skin: normal, no rashes  HEENT: normal, moist oropharynx, no nasal or oral ulcers  Lymph nodes: no palpable adenopathy  Lungs: normal respiratory effort, comfortable on room air, lungs clear to auscultation b/l "   Heart: normal heart sounds, normal rate, normal rhythm,  Abdomen: soft, normal bowel sounds, no tenderness  Neurologic: no obvious neurological deficits   Extremities: no edema, warm and well perfused     Musculoskeletal Exam:   - Observation: no obvious joint abnormalities    - Palpation: Diffusely tender to palpation in the thoracic paraspinal region and forearms   - Synovitis: absent  - Joint effusions: absent  - ROM: intact throughout  - Muscle Strength: 5/5 throughout       I have independently reviewed the following labs/images and interpret them as follows.  RF negative   CCP negative   BERNADINE negative     XR hip 9/25/21  There is no acute fracture or dislocation.  No significant hip degenerative changes.  No lytic or blastic osseous lesion.  Soft tissues are unremarkable.  The visualized lumbar spine is unremarkable.    MRI pelvis 10/25/24  No acute MR findings. No MR evidence for sacroiliitis.     Cate Malave DO, CCD, RhMSUS  [facility]

## 2025-04-09 NOTE — PROGRESS NOTES
Virtual Regular VisitName: Cruz Schneider      : 1990      MRN: [id number]  Encounter Provider: Jacki Perez PA-C  Encounter Date: 2025   Encounter department: [facility] [place]  :  Assessment & Plan  Chronic migraine without aura without status migrainosus, not intractable  Continue Botox.  Since starting botox, the patient reports greater than 7 days of migraine relief from baseline, correlated with headache diary, decreased abortive medication use and decreased ER visits.    Continue magnesium for prevention, as well as Emgality monthly.  As needed migraine onset she uses Toradol for a lower grade headache, indomethacin if the migraine is severe.  She does not combine the 2 of these medications within 24 hours.  DHE nasal spray seems to work last resort or if the migraine is severe.  Orders:    magnesium Oxide (MAG-OX) 400 mg TABS; Take 1 tablet (400 mg total) by mouth daily (Patient not taking: Reported on 4/10/2025)    ketorolac (TORADOL) 10 mg tablet; Take 1 tablet (10 mg total) by mouth every 6 (six) hours as needed (migraine) Max 2-3 per week. (Patient not taking: Reported on 4/10/2025)    B12 deficiency  Continue injection monthly.           The patient should not hesitate to call me prior to her follow up with any questions or concerns.      History of Present Illness     HPI     Botox arh, says she has been doing better, she had 3 migraines last month and 1 so far this month. She said her medications do help.    She stopped magnesium and iron in the past, but she thinks she needs to restart this now.    Migraine frequency: February she had 2 migraines, March she had 3 in  so far.  They are significantly reduced with Botox and Emgality combination, and she denies side effects to these.    She has not had severe migraine headaches associated with any neurologic symptoms, such as in the past when she had confusion episodes with the migraines.  She has not had any  focal deficits with the migraines.    She typically uses indomethacin or Toradol, not together.  She uses the DHE nasal spray second if those do not help.    She also reports that she started Reiki for additional healing.  She does aqua therapy and is going to start taking walks outside with her mom when the weather is nice.    Migraines:  Reaches pain level at worst: 10/10  Frequency:   4/14/2025: See above, 2 to 3/month approximately  3/12/2024: 3-4 migraines per month  Duration: 1-2 days if able to catch it  Location: left frontal/ temporal, last night on right side, apex, headband region  Quality: throbbing  Worse with: not worse with bend, cough, sneeze  Associated with: nausea, vomiting, photophobia, phonophobia, smell sens, dizziness, new sxs of drunk feeling and spacy     Triggers: Certain smells, sometimes caffeine, skipping meals, lack of sleep, menstrual cycle      Aura/ warning: none     Medications tried:  Prevention-  Aimovig- helping  Emgality  Depakote-side effects/allergy  Gabapentin  Methocarbamol  Tizanidine-allergy  Lyrica  Propranolol  Topamax, Trokendi XR  Zoloft     Abortive-  Tylenol  Decadron- typically helps break the cycle, did not this time unfortunately  Sumatriptan  Rizatriptan  toradol- takes first, then uses indocin if that fails (recently these have not been helping)  Indomethacin  Reglan, Zofran  Naproxen  Olanzapine-cannot take since on Abilify  ?Ubrelvy  Trudhesa  DHE nasal spray/Migranal     Other non-medication therapies or treatments-  - TPIs  - CBD/ THC     Neck pain and description: none  Sleep concerns: sleeps okay       Review of Systems   Constitutional:  Negative for appetite change, fatigue and fever.   HENT: Negative.  Negative for hearing loss, tinnitus, trouble swallowing and voice change.    Eyes: Negative.  Negative for photophobia, pain and visual disturbance.   Respiratory: Negative.  Negative for shortness of breath.    Cardiovascular: Negative.  Negative for  palpitations.   Gastrointestinal: Negative.  Negative for nausea and vomiting.   Endocrine: Negative.  Negative for cold intolerance.   Genitourinary: Negative.  Negative for dysuria, frequency and urgency.   Musculoskeletal:  Negative for back pain, gait problem, myalgias, neck pain and neck stiffness.   Skin: Negative.  Negative for rash.   Allergic/Immunologic: Negative.    Neurological:  Positive for headaches. Negative for dizziness, tremors, seizures, syncope, facial asymmetry, speech difficulty, weakness, light-headedness and numbness.   Hematological: Negative.  Does not bruise/bleed easily.   Psychiatric/Behavioral: Negative.  Negative for confusion, hallucinations and sleep disturbance.    The following portions of the patient's history were reviewed and updated as appropriate: allergies, current medications/ medication history, past family history, past medical history, past social history, past surgical history and problem list.    Review of systems was reviewed and otherwise unremarkable from a neurological perspective.      Objective   There were no vitals taken for this visit.    Physical Exam  Neurological exam:  On neurologic exam, the patient is alert and oriented to time and place. Speech is fluent and articulate, and the patient follows commands appropriately. Judgment and affect appear normal.  Extraocular muscles are intact without nystagmus. Face is symmetric. Hearing is intact bilaterally. Motor examination reveals adequate range of motion.     Administrative Statements   Encounter provider Jacki Perez PA-C    The Patient is located at Home and in the following state in which I hold an active license [place].    The patient was identified by name and date of birth. Cruz Schneider was informed that this is a telemedicine visit and that the visit is being conducted through the Epic Embedded platform. She agrees to proceed..  My office door was closed. No one else was in the room.  She  acknowledged consent and understanding of privacy and security of the video platform. The patient has agreed to participate and understands they can discontinue the visit at any time.    I have spent a total time of 20 minutes in caring for this patient on the day of the visit/encounter including Risks and benefits of tx options, Instructions for management, Patient and family education, Importance of tx compliance, Risk factor reductions, Impressions, Counseling / Coordination of care, Documenting in the medical record, Reviewing/placing orders in the medical record (including tests, medications, and/or procedures), and Obtaining or reviewing history  , not including the time spent for establishing the audio/video connection.

## 2025-04-09 NOTE — TELEPHONE ENCOUNTER
Pt called. She has a virtual visit scheduled for today at 1:30 pm. States that she missed a call from the office. Epic SC message sent to the MA to make her aware.

## 2025-04-10 ENCOUNTER — OFFICE VISIT (OUTPATIENT)
Age: 35
End: 2025-04-10
Payer: COMMERCIAL

## 2025-04-10 VITALS
BODY MASS INDEX: 43.55 KG/M2 | SYSTOLIC BLOOD PRESSURE: 180 MMHG | HEIGHT: 63 IN | DIASTOLIC BLOOD PRESSURE: 104 MMHG | WEIGHT: 245.8 LBS | OXYGEN SATURATION: 99 % | HEART RATE: 89 BPM

## 2025-04-10 DIAGNOSIS — M79.7 FIBROMYALGIA SYNDROME: Primary | ICD-10-CM

## 2025-04-10 DIAGNOSIS — M45.0 ANKYLOSING SPONDYLITIS OF MULTIPLE SITES IN SPINE (HCC): ICD-10-CM

## 2025-04-10 PROCEDURE — 99213 OFFICE O/P EST LOW 20 MIN: CPT | Performed by: STUDENT IN AN ORGANIZED HEALTH CARE EDUCATION/TRAINING PROGRAM

## 2025-04-10 NOTE — ASSESSMENT & PLAN NOTE
Patient is a 34-year-old female presenting with diffuse arthralgias and myalgias.  Pain is worse with physical activity.  Patient has tenderness to palpation of multiple large muscle groups.  Patient also endorses fatigue, brain fog, word finding difficulties, anxiety.  Discussed with patient that symptoms seem most consistent with fibromyalgia at this point.  Patient did aqua therapy which did provide relief.  Patient states that she will use her [employer] pool during the summer months.  Discussed long-term management of fibromyalgia is through PCP.  - continue gabapentin per PCP

## 2025-04-10 NOTE — ASSESSMENT & PLAN NOTE
Patient was previously diagnosed with ankylosing spondylitis due to low back pain. Patient was on multiple biologic therapy without any benefit. Patient was unresponsive to NSAIDs. Denies having morning stiffness, the pain typically lasts all day.  Patient underwent an MRI of the pelvis without any signs of sacroiliitis. Currently low clinical suspicion for inflammatory back pain. Less likely ankylosing spondylitis and will continue to monitor off of therapy. No dactylitis or enthesitis on exam.        RTC in 1 year

## 2025-04-11 ENCOUNTER — TELEMEDICINE (OUTPATIENT)
Dept: BEHAVIORAL/MENTAL HEALTH CLINIC | Facility: CLINIC | Age: 35
End: 2025-04-11
Payer: COMMERCIAL

## 2025-04-11 DIAGNOSIS — F41.1 GENERALIZED ANXIETY DISORDER: Primary | Chronic | ICD-10-CM

## 2025-04-11 DIAGNOSIS — F33.1 DEPRESSION, MAJOR, RECURRENT, MODERATE (HCC): Chronic | ICD-10-CM

## 2025-04-11 PROCEDURE — 90837 PSYTX W PT 60 MINUTES: CPT | Performed by: SOCIAL WORKER

## 2025-04-11 NOTE — PSYCH
"Behavioral Health Psychotherapy Progress Note    Psychotherapy Provided: Individual Psychotherapy     1. Generalized anxiety disorder        2. Depression, major, recurrent, moderate (HCC)            Goals addressed in session: Goal 1     DATA: Met with Stephanie for her scheduled individual session. ***  During this session, this clinician used the following therapeutic modalities: Client-centered Therapy, Dialectical Behavior Therapy, Mindfulness-based Strategies, Motivational Interviewing, Solution-Focused Therapy, and Supportive Psychotherapy    Substance Abuse was not addressed during this session. If the client is diagnosed with a co-occurring substance use disorder, please indicate any changes in the frequency or amount of use: n/a. Stage of change for addressing substance use diagnoses: No substance use/Not applicable    ASSESSMENT:  Stephanie Schneider presents with a Euthymic/ normal mood.     her affect is Normal range and intensity, which is congruent, with her mood and the content of the session. The client has made progress on their goals.    Stephanie Schneider presents with a minimal risk of suicide, minimal risk of self-harm, and minimal risk of harm to others.    For any risk assessment that surpasses a \"low\" rating, a safety plan must be developed.    A safety plan was indicated: no  If yes, describe in detail n/a    PLAN: Between sessions, Stephanie Schneider will continue to work on increasing her social activities (in a variety of activities) to help build her social networks. She will increase activity as she is able, and she will monitor her moods. At the next session, the therapist will use Client-centered Therapy, Dialectical Behavior Therapy, Mindfulness-based Strategies, Motivational Interviewing, Solution-Focused Therapy, and Supportive Psychotherapy to address her mood regulation, health-related stressors, and relationship concerns.    Behavioral Health Treatment Plan and Discharge Planning: Stephanie SERGIO " Wyatt is aware of and agrees to continue to work on their treatment plan. They have identified and are working toward their discharge goals. yes    Depression Follow-up Plan Completed: Yes    Visit start and stop times:    04/11/25  Start Time: 0803

## 2025-04-11 NOTE — PSYCH
"Virtual Regular VisitName: Cruz Schneider      : 1990      MRN: [id number]  Encounter Provider: APARNA Fox  Encounter Date: 2025   Encounter department: [facility] [place]  :  Assessment & Plan  Generalized anxiety disorder         Depression, major, recurrent, moderate (HCC)             Goals addressed in session: Goal 1     DATA: Met with Stephanie for her scheduled individual session. Stephanie discussed her mood and health related issues, as well as her efforts to increase her socialization and activities. She states that she has been trying to engage in activities that bring her ciara; however, she has been spending more time at home. She states that she has been doing some more things with her mother, and she also identifies that she needs to find more friends that she can spend time with, so she is not so dependent upon her mother. Stephanie states that she has not been reaching out to Jacqueline as often, as she states that Jacqueline has been spending time with a \"new friend\" and hasn't had time for Stephanie. She states that she continues to feel frustrated with this friendship. Stephanie states that she has been feeling very positive about her relationship with her uncle. He is planning to do some work in her room to expand her space, as he realized that her room was too small for her. Stephanie discussed some of the activities that she wants to participate in. She talked about a recent class she found out about re: crystal healing. She is hopeful that she will be able to learn more about this practice. Stephanie has been engaging in online reiki healing with a practitioner, and she finds this to be very helpful for her.     During this session, this clinician used the following therapeutic modalities: Client-centered Therapy, Dialectical Behavior Therapy, Mindfulness-based Strategies, Motivational Interviewing, Solution-Focused Therapy, and Supportive " "Psychotherapy    Substance Abuse was not addressed during this session. If the client is diagnosed with a co-occurring substance use disorder, please indicate any changes in the frequency or amount of use: n/a. Stage of change for addressing substance use diagnoses: No substance use/Not applicable    ASSESSMENT:  Cruz presents with a Euthymic/ normal mood. Jomars affect is Normal range and intensity, which is congruent, with their mood and the content of the session. The client has made progress on their goals as evidenced by her thoughts of spending more time with other people and her attempts to engage in varied activities.    Cruz presents with a minimal risk of suicide, minimal risk of self-harm, and minimal risk of harm to others.    For any risk assessment that surpasses a \"low\" rating, a safety plan must be developed.    A safety plan was indicated: no  If yes, describe in detail n/a    PLAN: Between sessions, Cruz will continue to monitor her moods. She will continue to explore activities-- especially those that could lead to additional social opportunities. At the next session, the therapist will use Client-centered Therapy, Dialectical Behavior Therapy, Mindfulness-based Strategies, Motivational Interviewing, Solution-Focused Therapy, and Supportive Psychotherapy to address her mood regulation and relationship development.    Behavioral Health Treatment Plan [facility]: Diagnosis and Treatment Plan explained to Cruz, Cruz relates understanding diagnosis and is agreeable to Treatment Plan. Yes     Depression Follow-up Plan Completed: Yes     Reason for visit is No chief complaint on file.     Recent Visits  Date Type Provider Dept   04/16/25 Office Visit [facility]   Showing recent visits within past 7 days and meeting all other requirements  Future Appointments  No visits were found meeting these conditions.  Showing future appointments within " next 150 days and meeting all other requirements     History of Present Illness     HPI    Past Medical History   Past Medical History:   Diagnosis Date    Abnormal Pap smear of cervix     Allergic     Anxiety     Arthritis     Dental caries     Depression     Fibromyalgia, primary     GERD (gastroesophageal reflux disease)     IBS (irritable bowel syndrome)     Migraine     Obesity     Vitamin B12 deficiency      Past Surgical History:   Procedure Laterality Date    COLONOSCOPY N/A 5/8/2018    Procedure: COLONOSCOPY;  Surgeon: Stephanie Alston MD;  Location: [facility] GI LAB;  Service: Gastroenterology    DC EXC B9 LESION MRGN XCP SK TG S/N/H/F/G > 4.0CM N/A 7/1/2021    Procedure: EXCISION OF PILAR SCALP CYST X 2 AND RIGHT INNER GROIN NEVVUS;  Surgeon: Denis Barron MD;  Location:  MAIN OR;  Service: Plastics    DC LAPS SURG CHOLECYSTECTOMY W/CHOLANGIOGRAPHY N/A 12/26/2024    Procedure: CHOLECYSTECTOMY LAPAROSCOPIC;  Surgeon: Kulwinder Nunn MD;  Location: [facility];  Service: General    DC REPAIR COMPLEX SCALP/ARM/LEG 2.6-7.5 CM N/A 7/1/2021    Procedure: CLOSURE WOUND SCALP X 2 AND RIGHT INNER GROIN;  Surgeon: Denis Barron MD;  Location:  MAIN OR;  Service: Plastics    TONSILLECTOMY      WISDOM TOOTH EXTRACTION       Current Outpatient Medications   Medication Instructions    acetaminophen (TYLENOL) 500 mg, Every 6 hours PRN    ALPRAZolam (XANAX) 0.5 mg, Oral, 3 times daily PRN    ascorbic acid (VITAMIN C) 500 mg, Daily    bisacodyl (DULCOLAX) 5 mg EC tablet Take as directed by GI office    Botox 200 units SOLR     buPROPion (WELLBUTRIN XL) 300 mg, Oral, Daily    cyanocobalamin 1,000 mcg/mL 1 IM injection every month    dihydroergotamine (MIGRANAL) 4 MG/ML nasal spray 1 spray, Nasal, As needed, Use in one nostril as directed.  No more than 4 sprays in one hour    diphenoxylate-atropine (LOMOTIL) 2.5-0.025 mg per tablet 1 tablet, Oral, 4 times daily PRN    escitalopram (LEXAPRO) 20 mg, Oral, Daily     etonogestrel-ethinyl estradiol (NuvaRing) 0.12-0.015 MG/24HR vaginal ring Insert ring for 21 days then remove for one week.    FeroSul 325 mg, Oral, Daily    gabapentin (NEURONTIN) 300 mg, Oral, 3 times daily    Galcanezumab-gnlm (Emgality) 120 MG/ML SOAJ Inject as directed 1 ml Subcutaneous  every 30 days    indomethacin (INDOCIN) 25 mg capsule 1-2 tabs BID prn severe headache with food. Hold toradol.    ketorolac (TORADOL) 10 mg, Oral, Every 6 hours PRN, Max 2-3 per week.    magnesium Oxide (MAG-OX) 400 mg, Oral, Daily    meloxicam (MOBIC) 15 mg, Oral, Daily PRN    methocarbamol (ROBAXIN) 500 mg, Oral, 3 times daily    mupirocin (BACTROBAN) 2 % cream Apply bid to areas needed    mupirocin (BACTROBAN) 2 % ointment Topical, 3 times daily    nystatin (MYCOSTATIN) cream Topical, 2 times daily    nystatin-triamcinolone (MYCOLOG-II) cream Topical, 4 times daily    OLANZapine (ZYPREXA) 7.5 mg, Oral, Daily at bedtime, Do not take with reglan.    ondansetron (ZOFRAN) 4 mg, Oral, Every 6 hours    polyethylene glycol (MIRALAX) 17 g, As needed    pramipexole (MIRAPEX) 0.25 mg, Oral, Daily at bedtime    prazosin (MINIPRESS) 2 mg, Oral, Daily at bedtime    RABEprazole (ACIPHEX) 20 mg, Oral, 2 times daily    sodium chloride (OCEAN) 0.65 % nasal spray 1 spray, Nasal, As needed    Trudhesa 0.725 MG/ACT AERS 1 spray in each nostril for total dose of 1.45mg, may repeat 1 dose after 1 hour. Max 2 doses per 24 hours and 3 doses per week.    Vitamin D3 2,000 Units, Oral, Daily    Wegovy 0.25 mg, Subcutaneous, Weekly, Inject 0.25 mg under the skin weekly     Allergies   Allergen Reactions    Cimzia [Certolizumab Pegol] Swelling     Face and hands    Desvenlafaxine Other (See Comments)     Other reaction(s): state of confusion    Ixekizumab Vomiting    Seasonal Ic [Octacosanol] Nasal Congestion    Tizanidine Anxiety     Panic attacks       Objective   There were no vitals taken for this visit.    Video Exam  Physical Exam      Administrative Statements   Encounter provider APARNA Fox    The Patient is located at Home and in the following state in which I hold an active license [place].    The patient was identified by name and date of birth. Cruz HILL Schneider was informed that this is a telemedicine visit and that the visit is being conducted through the Epic Embedded platform. She agrees to proceed..  My office door was closed. No one else was in the room.  She acknowledged consent and understanding of privacy and security of the video platform. The patient has agreed to participate and understands they can discontinue the visit at any time.    Visit Time  Start Time: 0803  Stop Time: 0858  Total Visit Time: 55 minutes

## 2025-04-14 NOTE — ASSESSMENT & PLAN NOTE
Continue Botox.  Since starting botox, the patient reports greater than 7 days of migraine relief from baseline, correlated with headache diary, decreased abortive medication use and decreased ER visits.    Continue magnesium for prevention, as well as Emgality monthly.  As needed migraine onset she uses Toradol for a lower grade headache, indomethacin if the migraine is severe.  She does not combine the 2 of these medications within 24 hours.  DHE nasal spray seems to work last resort or if the migraine is severe.  Orders:    magnesium Oxide (MAG-OX) 400 mg TABS; Take 1 tablet (400 mg total) by mouth daily (Patient not taking: Reported on 4/10/2025)    ketorolac (TORADOL) 10 mg tablet; Take 1 tablet (10 mg total) by mouth every 6 (six) hours as needed (migraine) Max 2-3 per week. (Patient not taking: Reported on 4/10/2025)

## 2025-04-16 ENCOUNTER — OFFICE VISIT (OUTPATIENT)
Age: 35
End: 2025-04-16
Payer: COMMERCIAL

## 2025-04-16 VITALS
SYSTOLIC BLOOD PRESSURE: 120 MMHG | TEMPERATURE: 97.3 F | HEART RATE: 93 BPM | WEIGHT: 245 LBS | HEIGHT: 63 IN | BODY MASS INDEX: 43.41 KG/M2 | OXYGEN SATURATION: 99 % | DIASTOLIC BLOOD PRESSURE: 78 MMHG

## 2025-04-16 DIAGNOSIS — B07.0 PLANTAR WART OF LEFT FOOT: Primary | ICD-10-CM

## 2025-04-16 PROCEDURE — 99213 OFFICE O/P EST LOW 20 MIN: CPT | Performed by: FAMILY MEDICINE

## 2025-04-16 NOTE — PROGRESS NOTES
"Name: Cruz Schneider      : 1990      MRN: [id number]  Encounter Provider: Adan Beckham DO  Encounter Date: 2025   Encounter department: [facility]  :  Assessment & Plan  Plantar wart of left foot                History of Present Illness   Patient is here with rash on left foot.  Patient with some discomfort associate with this.  No significant brightest.  Moisturizing lotion use.    Rash      Review of Systems   Skin:  Positive for rash.       Objective   /78 (BP Location: Right arm, Patient Position: Sitting, Cuff Size: Large)   Pulse 93   Temp (!) 97.3 °F (36.3 °C) (Temporal)   Ht 5' 3\" (1.6 m)   Wt 111 kg (245 lb)   SpO2 99%   BMI 43.40 kg/m²      Physical Exam  Nursing note reviewed.   Constitutional:       General: She is not in acute distress.     Appearance: Normal appearance. She is not ill-appearing, toxic-appearing or diaphoretic.   Skin:     Findings: Rash present.      Comments: 3 warts noted left foot plantar aspect lateral foot   Neurological:      Mental Status: She is alert.         "

## 2025-04-18 ENCOUNTER — APPOINTMENT (OUTPATIENT)
Dept: PHYSICAL THERAPY | Age: 35
End: 2025-04-18
Payer: COMMERCIAL

## 2025-04-22 ENCOUNTER — APPOINTMENT (OUTPATIENT)
Dept: PHYSICAL THERAPY | Age: 35
End: 2025-04-22
Payer: COMMERCIAL

## 2025-04-22 ENCOUNTER — TELEPHONE (OUTPATIENT)
Age: 35
End: 2025-04-22

## 2025-04-22 ENCOUNTER — APPOINTMENT (OUTPATIENT)
Dept: LAB | Facility: CLINIC | Age: 35
End: 2025-04-22
Payer: COMMERCIAL

## 2025-04-22 ENCOUNTER — RESULTS FOLLOW-UP (OUTPATIENT)
Age: 35
End: 2025-04-22

## 2025-04-22 DIAGNOSIS — E55.9 VITAMIN D DEFICIENCY: ICD-10-CM

## 2025-04-22 DIAGNOSIS — E53.8 B12 DEFICIENCY: ICD-10-CM

## 2025-04-22 DIAGNOSIS — R79.82 ELEVATED C-REACTIVE PROTEIN: ICD-10-CM

## 2025-04-22 DIAGNOSIS — G43.709 CHRONIC MIGRAINE WITHOUT AURA WITHOUT STATUS MIGRAINOSUS, NOT INTRACTABLE: ICD-10-CM

## 2025-04-22 DIAGNOSIS — I10 HYPERTENSION, UNSPECIFIED TYPE: ICD-10-CM

## 2025-04-22 LAB
25(OH)D3 SERPL-MCNC: 22.1 NG/ML (ref 30–100)
ALBUMIN SERPL BCG-MCNC: 3.6 G/DL (ref 3.5–5)
ALP SERPL-CCNC: 73 U/L (ref 34–104)
ALT SERPL W P-5'-P-CCNC: 8 U/L (ref 7–52)
ANION GAP SERPL CALCULATED.3IONS-SCNC: 11 MMOL/L (ref 4–13)
AST SERPL W P-5'-P-CCNC: 10 U/L (ref 13–39)
BASOPHILS # BLD AUTO: 0.05 THOUSANDS/ÂΜL (ref 0–0.1)
BASOPHILS NFR BLD AUTO: 1 % (ref 0–1)
BILIRUB SERPL-MCNC: 0.47 MG/DL (ref 0.2–1)
BUN SERPL-MCNC: 6 MG/DL (ref 5–25)
CALCIUM SERPL-MCNC: 9 MG/DL (ref 8.4–10.2)
CHLORIDE SERPL-SCNC: 105 MMOL/L (ref 96–108)
CHOLEST SERPL-MCNC: 207 MG/DL (ref ?–200)
CO2 SERPL-SCNC: 23 MMOL/L (ref 21–32)
CORTIS AM PEAK SERPL-MCNC: 16.7 UG/DL (ref 6.7–22.6)
CREAT SERPL-MCNC: 0.64 MG/DL (ref 0.6–1.3)
CRP SERPL QL: 14.5 MG/L
EOSINOPHIL # BLD AUTO: 0.26 THOUSAND/ÂΜL (ref 0–0.61)
EOSINOPHIL NFR BLD AUTO: 3 % (ref 0–6)
ERYTHROCYTE [DISTWIDTH] IN BLOOD BY AUTOMATED COUNT: 14.3 % (ref 11.6–15.1)
GFR SERPL CREATININE-BSD FRML MDRD: 117 ML/MIN/1.73SQ M
GLUCOSE P FAST SERPL-MCNC: 85 MG/DL (ref 65–99)
HCT VFR BLD AUTO: 37.2 % (ref 34.8–46.1)
HDLC SERPL-MCNC: 57 MG/DL
HGB BLD-MCNC: 12 G/DL (ref 11.5–15.4)
IMM GRANULOCYTES # BLD AUTO: 0.03 THOUSAND/UL (ref 0–0.2)
IMM GRANULOCYTES NFR BLD AUTO: 0 % (ref 0–2)
IRON SATN MFR SERPL: 12 % (ref 15–50)
IRON SERPL-MCNC: 57 UG/DL (ref 50–212)
LDLC SERPL CALC-MCNC: 102 MG/DL (ref 0–100)
LYMPHOCYTES # BLD AUTO: 3.97 THOUSANDS/ÂΜL (ref 0.6–4.47)
LYMPHOCYTES NFR BLD AUTO: 42 % (ref 14–44)
MAGNESIUM SERPL-MCNC: 2 MG/DL (ref 1.9–2.7)
MCH RBC QN AUTO: 28.1 PG (ref 26.8–34.3)
MCHC RBC AUTO-ENTMCNC: 32.3 G/DL (ref 31.4–37.4)
MCV RBC AUTO: 87 FL (ref 82–98)
MONOCYTES # BLD AUTO: 0.62 THOUSAND/ÂΜL (ref 0.17–1.22)
MONOCYTES NFR BLD AUTO: 7 % (ref 4–12)
NEUTROPHILS # BLD AUTO: 4.46 THOUSANDS/ÂΜL (ref 1.85–7.62)
NEUTS SEG NFR BLD AUTO: 47 % (ref 43–75)
NONHDLC SERPL-MCNC: 150 MG/DL
NRBC BLD AUTO-RTO: 0 /100 WBCS
PLATELET # BLD AUTO: 492 THOUSANDS/UL (ref 149–390)
PMV BLD AUTO: 9.1 FL (ref 8.9–12.7)
POTASSIUM SERPL-SCNC: 3.5 MMOL/L (ref 3.5–5.3)
PROT SERPL-MCNC: 6.7 G/DL (ref 6.4–8.4)
RBC # BLD AUTO: 4.27 MILLION/UL (ref 3.81–5.12)
SODIUM SERPL-SCNC: 139 MMOL/L (ref 135–147)
TIBC SERPL-MCNC: 485.8 UG/DL (ref 250–450)
TRANSFERRIN SERPL-MCNC: 347 MG/DL (ref 203–362)
TRIGL SERPL-MCNC: 241 MG/DL (ref ?–150)
TSH SERPL DL<=0.05 MIU/L-ACNC: 2.17 UIU/ML (ref 0.45–4.5)
UIBC SERPL-MCNC: 429 UG/DL (ref 155–355)
VIT B12 SERPL-MCNC: 150 PG/ML (ref 180–914)
WBC # BLD AUTO: 9.39 THOUSAND/UL (ref 4.31–10.16)

## 2025-04-22 PROCEDURE — 82607 VITAMIN B-12: CPT

## 2025-04-22 PROCEDURE — 80061 LIPID PANEL: CPT

## 2025-04-22 PROCEDURE — 85025 COMPLETE CBC W/AUTO DIFF WBC: CPT

## 2025-04-22 PROCEDURE — 83550 IRON BINDING TEST: CPT

## 2025-04-22 PROCEDURE — 36415 COLL VENOUS BLD VENIPUNCTURE: CPT

## 2025-04-22 PROCEDURE — 83735 ASSAY OF MAGNESIUM: CPT

## 2025-04-22 PROCEDURE — 86140 C-REACTIVE PROTEIN: CPT

## 2025-04-22 PROCEDURE — 84443 ASSAY THYROID STIM HORMONE: CPT

## 2025-04-22 PROCEDURE — 82533 TOTAL CORTISOL: CPT

## 2025-04-22 PROCEDURE — 82306 VITAMIN D 25 HYDROXY: CPT

## 2025-04-22 PROCEDURE — 80053 COMPREHEN METABOLIC PANEL: CPT

## 2025-04-22 PROCEDURE — 83540 ASSAY OF IRON: CPT

## 2025-04-22 NOTE — TELEPHONE ENCOUNTER
Patient returned call for lab results. Read Dr. Beckham's note to her: Call patient.  Vitamin D is low at 23.  Vitamin B12 150.  Patient will need to supplement orally with vitamin D.  Patient to consider injection of vitamin B12 as well as supplement with 1000 mcg daily.     Patient said that she already started taking vitamin D and she is due to have another B12 injection.She will get a B12 supplement. Please schedule a B12 injection for her.

## 2025-04-23 ENCOUNTER — APPOINTMENT (OUTPATIENT)
Dept: PHYSICAL THERAPY | Age: 35
End: 2025-04-23
Payer: COMMERCIAL

## 2025-04-23 ENCOUNTER — CLINICAL SUPPORT (OUTPATIENT)
Age: 35
End: 2025-04-23
Payer: COMMERCIAL

## 2025-04-23 DIAGNOSIS — E53.8 B12 DEFICIENCY: Primary | ICD-10-CM

## 2025-04-23 PROCEDURE — 96372 THER/PROPH/DIAG INJ SC/IM: CPT

## 2025-04-23 PROCEDURE — PBNCHG PB NO CHARGE PLACEHOLDER

## 2025-04-23 RX ADMIN — CYANOCOBALAMIN 1000 MCG: 1000 INJECTION, SOLUTION INTRAMUSCULAR; SUBCUTANEOUS at 08:55

## 2025-04-25 ENCOUNTER — TELEMEDICINE (OUTPATIENT)
Dept: BEHAVIORAL/MENTAL HEALTH CLINIC | Facility: CLINIC | Age: 35
End: 2025-04-25
Payer: COMMERCIAL

## 2025-04-25 ENCOUNTER — OFFICE VISIT (OUTPATIENT)
Dept: PHYSICAL THERAPY | Age: 35
End: 2025-04-25
Payer: COMMERCIAL

## 2025-04-25 DIAGNOSIS — H92.02 LEFT EAR PAIN: ICD-10-CM

## 2025-04-25 DIAGNOSIS — F41.1 GENERALIZED ANXIETY DISORDER: Chronic | ICD-10-CM

## 2025-04-25 DIAGNOSIS — F33.1 DEPRESSION, MAJOR, RECURRENT, MODERATE (HCC): Primary | Chronic | ICD-10-CM

## 2025-04-25 DIAGNOSIS — M79.18 MYOFASCIAL PAIN: Primary | ICD-10-CM

## 2025-04-25 DIAGNOSIS — M79.7 FIBROMYALGIA: ICD-10-CM

## 2025-04-25 PROCEDURE — 97140 MANUAL THERAPY 1/> REGIONS: CPT

## 2025-04-25 PROCEDURE — 90837 PSYTX W PT 60 MINUTES: CPT | Performed by: SOCIAL WORKER

## 2025-04-25 PROCEDURE — 97110 THERAPEUTIC EXERCISES: CPT

## 2025-04-25 NOTE — BH TREATMENT PLAN
Outpatient Behavioral Health Psychotherapy Treatment Plan    Stephanie Schneider  1990     Date of Initial Psychotherapy Assessment: June 26, 2019     Date of Current Treatment Plan: 04/25/2025  Treatment Plan Target Date: 08/23/2025  Treatment Plan Expiration Date: 10/22/2025    Diagnosis:   1. Depression, major, recurrent, moderate (HCC)        2. Generalized anxiety disorder          Area(s) of Need: Mood regulation; relationships; physical health issues    Long Term Goal 1 (in the client's own words): I want to learn coping skills and techniques to deal with loss (re: my grandmother's death and the relationship with my best friend) and to continue to manage my anxiety. I also want to continue to expand my social support network and increasing activities.    Stage of Change: Action    Target Date for completion: 04/25/2026     Anticipated therapeutic modalities: client-centered therapy; dialectical behavior therapy; motivational interviewing     People identified to complete this goal: Stephanie (client); Shanna Moreno (psychotherapist); Dr. uNnn (psychiatrist)      Objective 1: (identify the means of measuring success in meeting the objective): Stephanie will continue to meet with Dr. Aliyah Nunn for medication management. She will discuss efficacy of medications and discuss side effects or other issues that she experiences. They will work together, as needed to make medication changes.    Update 04/25/2025: Stephanie continues to meet with Dr. Nunn and reports a positive therapeutic rapport. She states that she has an upcoming visit scheduled. She is planning to discuss her dysthymic mood with Dr. Nunn and will discuss whether or not a medication change might be indicated.       Objective 2: (identify the means of measuring success in meeting the objective): Stephanie will participate in DBT-informed therapy-- to learn and practice a minimum of three distress-tolerance skills. She will discuss successes and  "barriers in her therapy sessions.    Update 04/25/2025: Stephanie continues to work on increasing her use of mindfulness-based strategies. Over the next few months, she will learn the skills associated with Radical Acceptance and will exhibit the ability to use them in her daily routine. We will also explore the use of mindfulness meditation for pain management. She has been using meditations by \"[employer]\" approximately 2-3 times per week and would like to increase her use to a minimum of 5x per week over the next 6 months.       Objective 3: (identify the means of measuring success in meeting the objective): Stephanie will practice DBT-informed effective communication skills. She will continue to set limits/boundaries with friends/family and will discuss successes and barriers in her therapy sessions.    Update 04/25/2025: Stephanie has made significant progress with recognizing that her friend (Jacqueline) is not able to provide her with the type of support that she wishes she could. Stephanie is working in increasing her support network, while also maintaining limits and boundaries with her friends and family. Stephanie will continue to talk in her biweekly sessions about at least one incident where she has set an appropriate limit/boundary each month with a friend or family member.       Objective 4: (identify the means of measuring success in meeting the objective): Stephanie will discuss medical issues and impact on MH symptoms. She will continue to find a balance between pushing her physical activity level and managing overall emotional and physical fatigue. We will discuss successes and barriers in her therapy sessions.    Update 04/25/2025: Stephanie continues to struggle with knowing when to push herself to engage in physical activity and when to rest. She will continue to work on setting a time limit for herself and then get up and spend at least 5 minutes in gentle movement each day. If the gentle movement causes increased " discomfort, she will note this and discuss it with her medical providers. She will then follow their direction regarding how to proceed. Stephanie has participated in Aquatherapy, which has been helpful; however, it is extremely time-limited, so she is no longer able to participate in that activity.       Objective 5: (identify the means of measuring success in meeting the objective): Stephanie will process the death of her grandmother and will work through this process. We will discuss her progress in her therapy sessions.     Update 04/25/2025: NO CHANGE:  Stephanie appears to be moving through her grief process in an appropriate manner. She would like to keep this objective on the treatment plan, as she continues to work through this recent loss.      Objective 6: (identify the means of measuring success in meeting the objective): Stephanie will begin to focus on understanding her emotions and developing skills to manage and maintain her emotion regulation. The therapist will provide Stephanie with education and practice exercises (using DBT-informed skills) to increase her ability to identify and manage her moods.    Update 04/25/2025: Stephanie wants to focus more on developing her skills to manage her anxiety on a regular basis-- not just in acutely distressing situations. She is doing a bit better with working on art projects and engaging in activities that bring her ciara. She has recognized that she relies on her mother for her companionship, and she has shown motivation to meed additional people to broaden her [place] of friends. During the next 6 months, she will meet at least 2 people with whom she would like to develop a stronger connection and spend more time with.     I am currently under the care of a . [place]'s psychiatric provider: yes    My [place]'s psychiatric provider is: Dr. Aliyah Ghosh    I am currently taking psychiatric medications: Yes, as prescribed    I feel that I will be ready for discharge  from mental health care when I reach the following (measurable goal/objective): I have a strong social network and can share my thoughts/feelings with a supportive group of people I can depend on.    For children and adults who have a legal guardian:   Has there been any change to custody orders and/or guardianship status? NA. If yes, attach updated documentation.    Crisis plan was updated during this session.     Behavioral Health Treatment Plan [facility]: Diagnosis and Treatment Plan explained to Stephanie Schneider acknowledges an understanding of their diagnosis. Stephanie Schneider agrees to this treatment plan. The treatment plan is being sent to the client, via the [employer] platform. This clinician will check for the signature at the next individual session.     I have been offered a copy of this Treatment Plan. Yes (via [employer])

## 2025-04-25 NOTE — PSYCH
"Virtual Regular VisitName: Cruz Schneider      : 1990      MRN: [id number]  Encounter Provider: APARNA Fox  Encounter Date: 2025   Encounter department: [facility] [place]  :  Assessment & Plan  Depression, major, recurrent, moderate (HCC)         Generalized anxiety disorder         Depression, major, recurrent, moderate (HCC)     Generalized anxiety disorder       Goals addressed in session: Goal 1     DATA: Met with Stephanie for her scheduled individual session. She states, \"I'm struggling... with everything.\" She states that she feels like she is trying to use her skills and is working on \"letting things go.\" We spent some time discussing the negativity bias and the way that individuals with anxiety and depression can often begin to accumulate negative events and ignore positive events. She discussed her friendship with Jacqueline. She states that her friend sent her a message to inform Stephanie that she is pregnant and plans to keep this child. Stephanie discussed her frustration with this situation and her feelings that this friendship will never be the same or might actually end. Stephanie states that she has an appointment for reiki next week and feels that it is really helping her. She states that she is doing mindfulness meditation 2-3x weekly and is trying to increase the frequency-- because she does notice that it helps. We discussed making artistic reminders to put up around her room and continuing to work on building her social network to increase her ability to have different friends to meet different needs in her life. Stephanie spent time during this session reviewing and updating her treatment plan. She wants to focus on coping skills to deal with managing loss. She also wants to work on increasing her ability to manage her moods. She spoke, toward the end of the session, about managing her chronic pain. We discussed the concept of radical acceptance and " "use of mindfulness for pain management. We will incorporate that into her treatment as well.     During this session, this clinician used the following therapeutic modalities: Client-centered Therapy, Dialectical Behavior Therapy, Mindfulness-based Strategies, Motivational Interviewing, Solution-Focused Therapy, and Supportive Psychotherapy    Substance Abuse was not addressed during this session. If the client is diagnosed with a co-occurring substance use disorder, please indicate any changes in the frequency or amount of use: n/a. Stage of change for addressing substance use diagnoses: No substance use/Not applicable    ASSESSMENT:  Cruz presents with a Euthymic/ normal mood. Jomars affect is Normal range and intensity, which is congruent, with their mood and the content of the session. The client has made progress on their goals as evidenced by her ability to set limits with her friend, Jacqueline.    Cruz presents with a minimal risk of suicide, minimal risk of self-harm, and minimal risk of harm to others.    For any risk assessment that surpasses a \"low\" rating, a safety plan must be developed.    A safety plan was indicated: no  If yes, describe in detail n/a    PLAN: Between sessions, Cruz will continue to work on building her mindfulness skills. She will read information regarding Radical Acceptance and discuss at next session. At the next session, the therapist will use Client-centered Therapy, Dialectical Behavior Therapy, Mindfulness-based Strategies, Motivational Interviewing, Solution-Focused Therapy, and Supportive Psychotherapy to address her mood regulation and social support network.    Behavioral Health Treatment Plan [facility]: Diagnosis and Treatment Plan explained to Cruz, Cruz relates understanding diagnosis and is agreeable to Treatment Plan. Yes     Depression Follow-up Plan Completed: Yes     Reason for visit is   Chief Complaint   Patient presents with    Virtual " Regular Visit        Recent Visits  No visits were found meeting these conditions.  Showing recent visits within past 7 days and meeting all other requirements  Today's Visits  Date Type Provider Dept   04/25/25 Telemedicine APARNA Fox [facility] [place]   Showing today's visits and meeting all other requirements  Future Appointments  No visits were found meeting these conditions.  Showing future appointments within next 150 days and meeting all other requirements     History of Present Illness     HPI    Past Medical History   Past Medical History:   Diagnosis Date    Abnormal Pap smear of cervix     Allergic     Anxiety     Arthritis     Dental caries     Depression     Fibromyalgia, primary     GERD (gastroesophageal reflux disease)     IBS (irritable bowel syndrome)     Migraine     Obesity     Vitamin B12 deficiency      Past Surgical History:   Procedure Laterality Date    COLONOSCOPY N/A 5/8/2018    Procedure: COLONOSCOPY;  Surgeon: Stephanie Alston MD;  Location: [facility] GI LAB;  Service: Gastroenterology    KY EXC B9 LESION MRGN XCP SK TG S/N/H/F/G > 4.0CM N/A 7/1/2021    Procedure: EXCISION OF PILAR SCALP CYST X 2 AND RIGHT INNER GROIN NEVVUS;  Surgeon: Denis Barron MD;  Location:  MAIN OR;  Service: Plastics    KY LAPS SURG CHOLECYSTECTOMY W/CHOLANGIOGRAPHY N/A 12/26/2024    Procedure: CHOLECYSTECTOMY LAPAROSCOPIC;  Surgeon: Kulwinder Nunn MD;  Location: [facility] MAIN OR;  Service: General    KY REPAIR COMPLEX SCALP/ARM/LEG 2.6-7.5 CM N/A 7/1/2021    Procedure: CLOSURE WOUND SCALP X 2 AND RIGHT INNER GROIN;  Surgeon: Denis Barron MD;  Location:  MAIN OR;  Service: Plastics    TONSILLECTOMY      WISDOM TOOTH EXTRACTION       Current Outpatient Medications   Medication Instructions    acetaminophen (TYLENOL) 500 mg, Every 6 hours PRN    ALPRAZolam (XANAX) 0.5 mg, Oral, 3 times daily PRN    ascorbic acid (VITAMIN C) 500 mg, Daily    bisacodyl (DULCOLAX) 5 mg EC tablet Take  as directed by GI office    Botox 200 units SOLR     buPROPion (WELLBUTRIN XL) 300 mg, Oral, Daily    cyanocobalamin 1,000 mcg/mL 1 IM injection every month    dihydroergotamine (MIGRANAL) 4 MG/ML nasal spray 1 spray, Nasal, As needed, Use in one nostril as directed.  No more than 4 sprays in one hour    diphenoxylate-atropine (LOMOTIL) 2.5-0.025 mg per tablet 1 tablet, Oral, 4 times daily PRN    escitalopram (LEXAPRO) 20 mg, Oral, Daily    etonogestrel-ethinyl estradiol (NuvaRing) 0.12-0.015 MG/24HR vaginal ring Insert ring for 21 days then remove for one week.    FeroSul 325 mg, Oral, Daily    gabapentin (NEURONTIN) 300 mg, Oral, 3 times daily    Galcanezumab-gnlm (Emgality) 120 MG/ML SOAJ Inject as directed 1 ml Subcutaneous  every 30 days    indomethacin (INDOCIN) 25 mg capsule 1-2 tabs BID prn severe headache with food. Hold toradol.    ketorolac (TORADOL) 10 mg, Oral, Every 6 hours PRN, Max 2-3 per week.    magnesium Oxide (MAG-OX) 400 mg, Oral, Daily    meloxicam (MOBIC) 15 mg, Oral, Daily PRN    methocarbamol (ROBAXIN) 500 mg, Oral, 3 times daily    mupirocin (BACTROBAN) 2 % cream Apply bid to areas needed    mupirocin (BACTROBAN) 2 % ointment Topical, 3 times daily    nystatin (MYCOSTATIN) cream Topical, 2 times daily    nystatin-triamcinolone (MYCOLOG-II) cream Topical, 4 times daily    OLANZapine (ZYPREXA) 7.5 mg, Oral, Daily at bedtime, Do not take with reglan.    ondansetron (ZOFRAN) 4 mg, Oral, Every 6 hours    polyethylene glycol (MIRALAX) 17 g, As needed    pramipexole (MIRAPEX) 0.25 mg, Oral, Daily at bedtime    prazosin (MINIPRESS) 2 mg, Oral, Daily at bedtime    RABEprazole (ACIPHEX) 20 mg, Oral, 2 times daily    sodium chloride (OCEAN) 0.65 % nasal spray 1 spray, Nasal, As needed    Trudhesa 0.725 MG/ACT AERS 1 spray in each nostril for total dose of 1.45mg, may repeat 1 dose after 1 hour. Max 2 doses per 24 hours and 3 doses per week.    Vitamin D3 2,000 Units, Oral, Daily    Wegovy 0.25 mg,  Subcutaneous, Weekly, Inject 0.25 mg under the skin weekly     Allergies   Allergen Reactions    Cimzia [Certolizumab Pegol] Swelling     Face and hands    Desvenlafaxine Other (See Comments)     Other reaction(s): state of confusion    Ixekizumab Vomiting    Seasonal Ic [Octacosanol] Nasal Congestion    Tizanidine Anxiety     Panic attacks       Objective   There were no vitals taken for this visit.    Video Exam  Physical Exam     Administrative Statements   Encounter provider APARNA Fox    The Patient is located at Home and in the following state in which I hold an active license [place].    The patient was identified by name and date of birth. Cruz Schneider was informed that this is a telemedicine visit and that the visit is being conducted through the Epic Embedded platform. She agrees to proceed..  My office door was closed. No one else was in the room.  She acknowledged consent and understanding of privacy and security of the video platform. The patient has agreed to participate and understands they can discontinue the visit at any time.        Visit Time  Start Time: 1202  Stop Time: 1258  Total Visit Time: 56 minutes

## 2025-04-25 NOTE — BH CRISIS PLAN
Client Name: Stephanie Schneider       Client YOB: 1990    [place] Safety Plan      Creation Date: 4/25/25 Update Date: 4/25/26   Created By: APARNA Fox Last Updated By: APARNA Fox      Step 1: Warning Signs:   Warning Signs   I get hot and my face gets red   My hearing and vision change (I have a dull ringing in my ears and my vision gets darker)   I start to sweat            Step 2: Internal Coping Strategies:   Internal Coping Strategies   Listen to music-- focus on the lyrics   Medicinal remedies   Spend time with my cat (sometimes)            Step 3: People and social settings that provide distraction:   Name Contact Information   Mom     Places   My room           Step 4: People whom I can ask for help during a crisis:      Name Contact Information    Mom     KP and Alyse       Step 5: Professionals or agencies I can contact during a crisis:      Clinican/Agency Name Phone Emergency Contact    Ria Moreno [phone]     Dr. Nunn [phone]     Adan Beckham (PCP) in my phone       Local Emergency Department Emergency Department Phone Emergency Department Address    [facility]          Crisis Phone Numbers:   Suicide Prevention Lifeline: Call or Text  509 Crisis Text Line: Text HOME to 246-792   Please note: Some [facility] do not have a separate number for Child/Adolescent specific crisis. If your county is not listed under Child/Adolescent, please call the adult number for your county      Adult Crisis Numbers: Child/Adolescent Crisis Numbers   [facility]: [phone] [facility]: [phone]   [facility]: [phone] [facility]: [phone]   [facility]: [phone] [place]: [phone]   [facility]: [phone] [place] [facility]: [phone]   [place] [facility]: [phone]   [facility]: [phone]   [facility]: [phone]   [facility] [facility]: [phone] (daytime) [phone]  (after hours, weekends, holidays)      Step 6: Making the environment safer (plan for lethal means safety):   Plan: No lethal means.      Optional: What is most important to me and worth living for?   My mom.      Dorothy Safety Plan. Laura Degroot and Oni Galan. Used with permission of the authors.

## 2025-04-26 NOTE — PROGRESS NOTES
"Daily Note     Today's date: 2025  Patient name: Cruz Schneider  : 1990  MRN: [id number]  Referring provider: Tanisha Newman P*  Dx:   Encounter Diagnosis     ICD-10-CM    1. Myofascial pain  M79.18       2. Fibromyalgia  M79.7       3. Left ear pain  H92.02                      Subjective: \"I've had a terrible time managing my fibromyalgia and pain everywhere.\"      Objective: See treatment diary below      Assessment: Tolerated treatment well. Patient would benefit from continued PT. Pt issued written handouts today.      Plan: Continue per plan of care.      Precautions: allergies  Reviewed by You at 11:40 PM   Severity Reactions Comments   Cimzia [certolizumab Pegol] High Swelling Face and hands   Desvenlafaxine Medium Other (See Comments) Other reaction(s): state of confusion   Ixekizumab Medium Vomiting    Seasonal Ic [octacosanol] Low Nasal Congestion    Tizanidine Low Anxiety Panic attacks         Manuals 3/27/25 3/31 4/25          Intraoral trigger point release prn by PT in future  Ieval   Man 15min           [place] to left myofascial facial musculature              Suboccipital release, man neck traction  man RK man          Trigger point release periscapular region sitting or prone    man          Neuro Re-Ed             R[place]abado 6 x 6 5 of 6 exer 6 reps each pt has handout Reviewed HEP 6 x 6          Upper trap stretch  15\"x3 ea 15 sec x 3          Levator stretch  15\"x3 ea 15 sec x 3          Scm stretch             Ant scalene stretch in supine   15 sec x 3          Foam roll self thoracic mobilization    5min  x 1          Sh ext , rows  and trial Y abd theraband in standing    Red 1 set of 10           Ther Ex             Corner pect stretch  15\"x3 20 sec x 5          Ube alt fwd, bwd  3'/3'           Scapular squeezes  x10 10          Sh backward rolls  x10 10                                                              Ther Activity                                            "                                   Modalities             Mh facial musculature prn  no charge  10'

## 2025-04-29 ENCOUNTER — APPOINTMENT (OUTPATIENT)
Dept: PHYSICAL THERAPY | Age: 35
End: 2025-04-29
Payer: COMMERCIAL

## 2025-05-01 ENCOUNTER — HOSPITAL ENCOUNTER (EMERGENCY)
Facility: HOSPITAL | Age: 35
Discharge: HOME | End: 2025-05-01
Attending: STUDENT IN AN ORGANIZED HEALTH CARE EDUCATION/TRAINING PROGRAM
Payer: COMMERCIAL

## 2025-05-01 ENCOUNTER — APPOINTMENT (EMERGENCY)
Dept: CT IMAGING | Facility: HOSPITAL | Age: 35
End: 2025-05-01
Payer: COMMERCIAL

## 2025-05-01 VITALS
TEMPERATURE: 97.9 F | DIASTOLIC BLOOD PRESSURE: 83 MMHG | WEIGHT: 239 LBS | HEIGHT: 63 IN | HEART RATE: 74 BPM | SYSTOLIC BLOOD PRESSURE: 150 MMHG | RESPIRATION RATE: 16 BRPM | BODY MASS INDEX: 42.35 KG/M2 | OXYGEN SATURATION: 98 %

## 2025-05-01 DIAGNOSIS — R19.7 DIARRHEA: ICD-10-CM

## 2025-05-01 DIAGNOSIS — R10.9 ABDOMINAL PAIN: Primary | ICD-10-CM

## 2025-05-01 LAB
ALBUMIN SERPL BCG-MCNC: 3.9 G/DL (ref 3.5–5)
ALP SERPL-CCNC: 63 U/L (ref 34–104)
ALT SERPL W P-5'-P-CCNC: 14 U/L (ref 7–52)
ANION GAP SERPL CALCULATED.3IONS-SCNC: 9 MMOL/L (ref 4–13)
AST SERPL W P-5'-P-CCNC: 15 U/L (ref 13–39)
BASOPHILS # BLD AUTO: 0.05 THOUSANDS/ÂΜL (ref 0–0.1)
BASOPHILS NFR BLD AUTO: 0 % (ref 0–1)
BILIRUB SERPL-MCNC: 0.65 MG/DL (ref 0.2–1)
BUN SERPL-MCNC: 8 MG/DL (ref 5–25)
CALCIUM SERPL-MCNC: 9.1 MG/DL (ref 8.4–10.2)
CHLORIDE SERPL-SCNC: 104 MMOL/L (ref 96–108)
CO2 SERPL-SCNC: 23 MMOL/L (ref 21–32)
CREAT SERPL-MCNC: 0.72 MG/DL (ref 0.6–1.3)
EOSINOPHIL # BLD AUTO: 0.18 THOUSAND/ÂΜL (ref 0–0.61)
EOSINOPHIL NFR BLD AUTO: 1 % (ref 0–6)
ERYTHROCYTE [DISTWIDTH] IN BLOOD BY AUTOMATED COUNT: 13.9 % (ref 11.6–15.1)
EXT PREGNANCY TEST URINE: NEGATIVE
EXT. CONTROL: NORMAL
GFR SERPL CREATININE-BSD FRML MDRD: 110 ML/MIN/1.73SQ M
GLUCOSE SERPL-MCNC: 95 MG/DL (ref 65–140)
HCT VFR BLD AUTO: 38.5 % (ref 34.8–46.1)
HGB BLD-MCNC: 12.8 G/DL (ref 11.5–15.4)
IMM GRANULOCYTES # BLD AUTO: 0.04 THOUSAND/UL (ref 0–0.2)
IMM GRANULOCYTES NFR BLD AUTO: 0 % (ref 0–2)
LIPASE SERPL-CCNC: 23 U/L (ref 11–82)
LYMPHOCYTES # BLD AUTO: 4.29 THOUSANDS/ÂΜL (ref 0.6–4.47)
LYMPHOCYTES NFR BLD AUTO: 32 % (ref 14–44)
MCH RBC QN AUTO: 28.6 PG (ref 26.8–34.3)
MCHC RBC AUTO-ENTMCNC: 33.2 G/DL (ref 31.4–37.4)
MCV RBC AUTO: 86 FL (ref 82–98)
MONOCYTES # BLD AUTO: 0.93 THOUSAND/ÂΜL (ref 0.17–1.22)
MONOCYTES NFR BLD AUTO: 7 % (ref 4–12)
NEUTROPHILS # BLD AUTO: 8.04 THOUSANDS/ÂΜL (ref 1.85–7.62)
NEUTS SEG NFR BLD AUTO: 60 % (ref 43–75)
NRBC BLD AUTO-RTO: 0 /100 WBCS
PLATELET # BLD AUTO: 532 THOUSANDS/UL (ref 149–390)
PMV BLD AUTO: 8.5 FL (ref 8.9–12.7)
POTASSIUM SERPL-SCNC: 4.1 MMOL/L (ref 3.5–5.3)
PROT SERPL-MCNC: 7.5 G/DL (ref 6.4–8.4)
RBC # BLD AUTO: 4.47 MILLION/UL (ref 3.81–5.12)
SODIUM SERPL-SCNC: 136 MMOL/L (ref 135–147)
WBC # BLD AUTO: 13.53 THOUSAND/UL (ref 4.31–10.16)

## 2025-05-01 PROCEDURE — 96361 HYDRATE IV INFUSION ADD-ON: CPT

## 2025-05-01 PROCEDURE — 99285 EMERGENCY DEPT VISIT HI MDM: CPT | Performed by: STUDENT IN AN ORGANIZED HEALTH CARE EDUCATION/TRAINING PROGRAM

## 2025-05-01 PROCEDURE — 83690 ASSAY OF LIPASE: CPT | Performed by: STUDENT IN AN ORGANIZED HEALTH CARE EDUCATION/TRAINING PROGRAM

## 2025-05-01 PROCEDURE — 80053 COMPREHEN METABOLIC PANEL: CPT | Performed by: STUDENT IN AN ORGANIZED HEALTH CARE EDUCATION/TRAINING PROGRAM

## 2025-05-01 PROCEDURE — 96374 THER/PROPH/DIAG INJ IV PUSH: CPT

## 2025-05-01 PROCEDURE — 81025 URINE PREGNANCY TEST: CPT | Performed by: STUDENT IN AN ORGANIZED HEALTH CARE EDUCATION/TRAINING PROGRAM

## 2025-05-01 PROCEDURE — 99284 EMERGENCY DEPT VISIT MOD MDM: CPT

## 2025-05-01 PROCEDURE — 85025 COMPLETE CBC W/AUTO DIFF WBC: CPT | Performed by: STUDENT IN AN ORGANIZED HEALTH CARE EDUCATION/TRAINING PROGRAM

## 2025-05-01 PROCEDURE — 36415 COLL VENOUS BLD VENIPUNCTURE: CPT | Performed by: STUDENT IN AN ORGANIZED HEALTH CARE EDUCATION/TRAINING PROGRAM

## 2025-05-01 PROCEDURE — 74177 CT ABD & PELVIS W/CONTRAST: CPT

## 2025-05-01 RX ORDER — ONDANSETRON 2 MG/ML
4 INJECTION INTRAMUSCULAR; INTRAVENOUS ONCE
Status: COMPLETED | OUTPATIENT
Start: 2025-05-01 | End: 2025-05-01

## 2025-05-01 RX ADMIN — IOHEXOL 100 ML: 350 INJECTION, SOLUTION INTRAVENOUS at 13:27

## 2025-05-01 RX ADMIN — ONDANSETRON 4 MG: 2 INJECTION INTRAMUSCULAR; INTRAVENOUS at 12:40

## 2025-05-01 RX ADMIN — SODIUM CHLORIDE 1000 ML: 0.9 INJECTION, SOLUTION INTRAVENOUS at 12:37

## 2025-05-01 NOTE — ED PROVIDER NOTES
Time reflects when diagnosis was documented in both MDM as applicable and the Disposition within this note       Time User Action Codes Description Comment    5/1/2025  2:59 PM Chadd Monroe Add [R10.9] Abdominal pain     5/1/2025  2:59 PM Chadd Monroe [R19.7] Diarrhea           ED Disposition       ED Disposition   Discharge    Condition   Stable    Date/Time   Thu May 1, 2025  2:59 PM    Comment   Cruz Schneider discharge to home/self care.                   Assessment & Plan       Medical Decision Making  Differential, colitis, diverticulitis, appendicitis patient does not have a gallbladder as such doubt cholecystitis, pregnancy, infectious diarrhea,    Problems Addressed:  Abdominal pain: acute illness or injury  Diarrhea: acute illness or injury    Amount and/or Complexity of Data Reviewed  Labs: ordered. Decision-making details documented in ED Course.  Radiology: ordered. Decision-making details documented in ED Course.    Risk  Prescription drug management.        ED Course as of 05/01/25 1500   Thu May 01, 2025   1251 WBC(!): 13.53       Medications   sodium chloride 0.9 % bolus 1,000 mL (0 mL Intravenous Stopped 5/1/25 1436)   ondansetron (ZOFRAN) injection 4 mg (4 mg Intravenous Given 5/1/25 1240)   iohexol (OMNIPAQUE) 350 MG/ML injection (MULTI-DOSE) 100 mL (100 mL Intravenous Given 5/1/25 1327)       ED Risk Strat Scores                    No data recorded        SBIRT 20yo+      Flowsheet Row Most Recent Value   Initial Alcohol Screen: US AUDIT-C     1. How often do you have a drink containing alcohol? 0 Filed at: 05/01/2025 1231   2. How many drinks containing alcohol do you have on a typical day you are drinking?  0 Filed at: 05/01/2025 1231   3b. FEMALE Any Age, or MALE 65+: How often do you have 4 or more drinks on one occassion? 0 Filed at: 05/01/2025 1231   Audit-C Score 0 Filed at: 05/01/2025 1231   BRUCE: How many times in the past year have you...    Used an illegal drug or used a  prescription medication for non-medical reasons? Never Filed at: 05/01/2025 1231                            History of Present Illness       Chief Complaint   Patient presents with    Abdominal Pain     Pt to ER from home for reports of LUQ pain x3 days associated with n/d. Denies vomiting. Denies urinary complaints. Reports history of IBS and states it feels similar.        Past Medical History:   Diagnosis Date    Abnormal Pap smear of cervix     Allergic     Anxiety     Arthritis     Dental caries     Depression     Fibromyalgia, primary     GERD (gastroesophageal reflux disease)     IBS (irritable bowel syndrome)     Migraine     Obesity     Vitamin B12 deficiency       Past Surgical History:   Procedure Laterality Date    COLONOSCOPY N/A 5/8/2018    Procedure: COLONOSCOPY;  Surgeon: Stephanie Alston MD;  Location: [facility];  Service: Gastroenterology    AR EXC B9 LESION MRGN XCP SK TG S/N/H/F/G > 4.0CM N/A 7/1/2021    Procedure: EXCISION OF PILAR SCALP CYST X 2 AND RIGHT INNER GROIN NEVVUS;  Surgeon: Denis Barron MD;  Location:  MAIN OR;  Service: Plastics    AR LAPS SURG CHOLECYSTECTOMY W/CHOLANGIOGRAPHY N/A 12/26/2024    Procedure: CHOLECYSTECTOMY LAPAROSCOPIC;  Surgeon: Kulwinder Nunn MD;  Location: [facility];  Service: General    AR REPAIR COMPLEX SCALP/ARM/LEG 2.6-7.5 CM N/A 7/1/2021    Procedure: CLOSURE WOUND SCALP X 2 AND RIGHT INNER GROIN;  Surgeon: Denis Barron MD;  Location:  MAIN OR;  Service: Plastics    TONSILLECTOMY      WISDOM TOOTH EXTRACTION        Family History   Problem Relation Age of Onset    Rheum arthritis Mother     Psoriasis Mother     Other Mother     Hypertension Mother     Diabetes unspecified Mother     Sjogren's syndrome Mother     Alcohol abuse Mother     Drug abuse Mother     Anxiety disorder Father     Alcohol abuse Father     Lung cancer Maternal Grandfather     Cancer Other         bone    Diabetes Other     Other Other         High blood pressure     Depression Maternal Grandmother       Social History     Tobacco Use    Smoking status: Never     Passive exposure: Past    Smokeless tobacco: Never   Vaping Use    Vaping status: Never Used   Substance Use Topics    Alcohol use: Not Currently     Comment: rarely    Drug use: Yes     Types: Marijuana     Comment: medical marijuana -smokes flower      E-Cigarette/Vaping    E-Cigarette Use Never User     Start Date 7/1/19     Comments medical marijuana       E-Cigarette/Vaping Substances    Nicotine No     THC No     CBD No     Flavoring No     Other No     Unknown No       I have reviewed and agree with the history as documented.     34-year-old female presents emergency department with approximately 3 days of generalized fatigue, abdominal pain primarily in the left upper quadrant diarrhea nausea and inability to maintain a p.o. diet.  Patient reported history of IBS, states this feels like her typical flareups.  She has not taken any home medications.  States she does follow with GI.  Has not seen them in quite some time.          Review of Systems   Constitutional:  Negative for chills and fever.   HENT:  Negative for ear pain and sore throat.    Eyes:  Negative for pain and visual disturbance.   Respiratory:  Negative for cough and shortness of breath.    Cardiovascular:  Negative for chest pain and palpitations.   Gastrointestinal:  Positive for abdominal pain, diarrhea and nausea. Negative for vomiting.   Genitourinary:  Negative for dysuria and hematuria.   Musculoskeletal:  Negative for arthralgias and back pain.   Skin:  Negative for color change and rash.   Neurological:  Negative for seizures and syncope.   All other systems reviewed and are negative.          Objective       ED Triage Vitals [05/01/25 1229]   Temperature Pulse Blood Pressure Respirations SpO2 Patient Position - Orthostatic VS   98.3 °F (36.8 °C) 87 (!) 173/92 18 99 % Sitting      Temp Source Heart Rate Source BP Location FiO2 (%) Pain  Score    Temporal Monitor Left arm -- 4      Vitals      Date and Time Temp Pulse SpO2 Resp BP Pain Score FACES Pain Rating User   05/01/25 1229 98.3 °F (36.8 °C) 87 99 % 18 173/92 4 -- AM            Physical Exam  Vitals and nursing note reviewed.   Constitutional:       General: She is not in acute distress.     Appearance: She is well-developed. She is obese.   HENT:      Head: Normocephalic and atraumatic.   Eyes:      Conjunctiva/sclera: Conjunctivae normal.      Pupils: Pupils are equal, round, and reactive to light.   Cardiovascular:      Rate and Rhythm: Normal rate and regular rhythm.      Heart sounds: Normal heart sounds. No murmur heard.     No friction rub.   Pulmonary:      Effort: Pulmonary effort is normal.      Breath sounds: Normal breath sounds.   Abdominal:      General: Bowel sounds are normal.      Palpations: Abdomen is soft.   Musculoskeletal:         General: Normal range of motion.      Cervical back: Normal range of motion and neck supple.   Skin:     General: Skin is warm.      Capillary Refill: Capillary refill takes less than 2 seconds.   Neurological:      Mental Status: She is alert and oriented to person, place, and time.      Motor: No abnormal muscle tone.      Coordination: Coordination normal.   Psychiatric:         Behavior: Behavior normal.         Thought Content: Thought content normal.         Results Reviewed       Procedure Component Value Units Date/Time    POCT pregnancy, urine [[id number]]  (Normal) Collected: 05/01/25 1314    Lab Status: Final result Updated: 05/01/25 1314     EXT Preg Test, Ur Negative     Control Valid    CMP [[id number]] Collected: 05/01/25 1241    Lab Status: Final result Specimen: Blood from Arm, Left Updated: 05/01/25 1301     Sodium 136 mmol/L      Potassium 4.1 mmol/L      Chloride 104 mmol/L      CO2 23 mmol/L      ANION GAP 9 mmol/L      BUN 8 mg/dL      Creatinine 0.72 mg/dL      Glucose 95 mg/dL      Calcium 9.1 mg/dL      AST 15 U/L      ALT  14 U/L      Alkaline Phosphatase 63 U/L      Total Protein 7.5 g/dL      Albumin 3.9 g/dL      Total Bilirubin 0.65 mg/dL      eGFR 110 ml/min/1.73sq m     Narrative:      National Kid[employer] Disease Foundation guidelines for Chronic Kidney Disease (CKD):     Stage 1 with normal or high GFR (GFR > 90 mL/min/1.73 square meters)    Stage 2 Mild CKD (GFR = 60-89 mL/min/1.73 square meters)    Stage 3A Moderate CKD (GFR = 45-59 mL/min/1.73 square meters)    Stage 3B Moderate CKD (GFR = 30-44 mL/min/1.73 square meters)    Stage 4 Severe CKD (GFR = 15-29 mL/min/1.73 square meters)    Stage 5 End Stage CKD (GFR <15 mL/min/1.73 square meters)  Note: GFR calculation is accurate only with a steady state creatinine    Lipase [[id number]]  (Normal) Collected: 05/01/25 1241    Lab Status: Final result Specimen: Blood from Arm, Left Updated: 05/01/25 1301     Lipase 23 u/L     CBC and differential [[id number]]  (Abnormal) Collected: 05/01/25 1241    Lab Status: Final result Specimen: Blood from Arm, Left Updated: 05/01/25 1251     WBC 13.53 Thousand/uL      RBC 4.47 Million/uL      Hemoglobin 12.8 g/dL      Hematocrit 38.5 %      MCV 86 fL      MCH 28.6 pg      MCHC 33.2 g/dL      RDW 13.9 %      MPV 8.5 fL      Platelets 532 Thousands/uL      nRBC 0 /100 WBCs      Segmented % 60 %      Immature Grans % 0 %      Lymphocytes % 32 %      Monocytes % 7 %      Eosinophils Relative 1 %      Basophils Relative 0 %      Absolute Neutrophils 8.04 Thousands/µL      Absolute Immature Grans 0.04 Thousand/uL      Absolute Lymphocytes 4.29 Thousands/µL      Absolute Monocytes 0.93 Thousand/µL      Eosinophils Absolute 0.18 Thousand/µL      Basophils Absolute 0.05 Thousands/µL             CT abdomen pelvis with contrast   Final Interpretation by Eliot Belle MD (05/01 1430)      No acute abdominopelvic abnormality.         Workstation performed: [id number]             Procedures    ED Medication and Procedure Management   Prior to Admission  Medications   Prescriptions Last Dose Informant Patient Reported? Taking?   ALPRAZolam (XANAX) 0.5 mg tablet  Self No No   Sig: Take 1 tablet (0.5 mg total) by mouth 3 (three) times a day as needed for anxiety Do not start before 2024.   Botox 200 units SOLR  Self Yes No   Cholecalciferol (Vitamin D3) 50 MCG (2000 UT) capsule   No No   Sig: Take 1 capsule (2,000 Units total) by mouth daily   FeroSul 325 (65 Fe) MG tablet  Self No No   Sig: Take 1 tablet (325 mg total) by mouth in the morning   Patient not taking: Reported on 2025   Galcanezumab-gnlm (Emgality) 120 MG/ML SOAJ  Self No No   Sig: Inject as directed 1 ml Subcutaneous  every 30 days   OLANZapine (ZyPREXA) 7.5 mg tablet   No No   Sig: Take 1 tablet (7.5 mg total) by mouth daily at bedtime Do not take with reglan.   RABEprazole (ACIPHEX) 20 MG tablet   No No   Sig: Take 1 tablet by mouth twice daily   Semaglutide-Weight Management (Wegovy) 0.25 MG/0.5ML   No No   Sig: Inject 0.5 mL (0.25 mg total) under the skin once a week Inject 0.25 mg under the skin weekly   Trudhesa 0.725 MG/ACT AERS  Self No No   Si spray in each nostril for total dose of 1.45mg, may repeat 1 dose after 1 hour. Max 2 doses per 24 hours and 3 doses per week.   acetaminophen (TYLENOL) 500 mg tablet  Self Yes No   Sig: Take 500 mg by mouth every 6 (six) hours as needed for mild pain (1-2 tablets)   ascorbic acid (VITAMIN C) 500 MG tablet  Self Yes No   Sig: Take 500 mg by mouth daily   bisacodyl (DULCOLAX) 5 mg EC tablet  Self No No   Sig: Take as directed by GI office   Patient not taking: Reported on 2024   buPROPion (WELLBUTRIN XL) 300 mg 24 hr tablet   No No   Sig: Take 1 tablet (300 mg total) by mouth daily   cyanocobalamin 1,000 mcg/mL  Self No No   Si IM injection every month   dihydroergotamine (MIGRANAL) 4 MG/ML nasal spray   No No   Si spray into each nostril as needed for migraine Use in one nostril as directed.  No more than 4 sprays in one  hour   diphenoxylate-atropine (LOMOTIL) 2.5-0.025 mg per tablet  Self No No   Sig: Take 1 tablet by mouth 4 (four) times a day as needed for diarrhea   Patient not taking: Reported on 2025   escitalopram (LEXAPRO) 20 mg tablet   No No   Sig: Take 1 tablet (20 mg total) by mouth daily   etonogestrel-ethinyl estradiol (NuvaRing) 0.12-0.015 MG/24HR vaginal ring  Self No No   Sig: Insert ring for 21 days then remove for one week.   gabapentin (NEURONTIN) 300 mg capsule  Self No No   Sig: Take 1 capsule (300 mg total) by mouth 3 (three) times a day   indomethacin (INDOCIN) 25 mg capsule   No No   Si-2 tabs BID prn severe headache with food. Hold toradol.   Patient not taking: Reported on 4/10/2025   ketorolac (TORADOL) 10 mg tablet   No No   Sig: Take 1 tablet (10 mg total) by mouth every 6 (six) hours as needed (migraine) Max 2-3 per week.   Patient not taking: Reported on 4/10/2025   magnesium Oxide (MAG-OX) 400 mg TABS   No No   Sig: Take 1 tablet (400 mg total) by mouth daily   Patient not taking: Reported on 4/10/2025   meloxicam (MOBIC) 15 mg tablet   No No   Sig: Take 1 tablet (15 mg total) by mouth daily as needed for moderate pain   methocarbamol (ROBAXIN) 500 mg tablet  Self No No   Sig: Take 1 tablet (500 mg total) by mouth 3 (three) times a day   mupirocin (BACTROBAN) 2 % cream   No No   Sig: Apply bid to areas needed   mupirocin (BACTROBAN) 2 % ointment  Self No No   Sig: Apply topically 3 (three) times a day   Patient not taking: Reported on 4/10/2025   nystatin (MYCOSTATIN) cream   No No   Sig: Apply topically 2 (two) times a day   nystatin-triamcinolone (MYCOLOG-II) cream   No No   Sig: Apply topically 4 (four) times a day   Patient not taking: Reported on 4/10/2025   ondansetron (ZOFRAN) 4 mg tablet  Self No No   Sig: Take 1 tablet (4 mg total) by mouth every 6 (six) hours   polyethylene glycol (MIRALAX) 17 g packet  Self Yes No   Sig: Take 17 g by mouth if needed   pramipexole (MIRAPEX) 0.25 mg  tablet   No No   Sig: Take 1 tablet (0.25 mg total) by mouth daily at bedtime   prazosin (MINIPRESS) 2 mg capsule   No No   Sig: Take 1 capsule (2 mg total) by mouth daily at bedtime   sodium chloride (OCEAN) 0.65 % nasal spray   No No   Si spray into each nostril as needed for congestion (dry nose)      Facility-Administered Medications Last Administration Doses Remaining   cyanocobalamin injection 1,000 mcg 2025  8:55 AM    cyanocobalamin injection 1,000 mcg 2024  5:04 PM         Patient's Medications   Discharge Prescriptions    No medications on file     No discharge procedures on file.  ED SEPSIS DOCUMENTATION   Time reflects when diagnosis was documented in both MDM as applicable and the Disposition within this note       Time User Action Codes Description Comment    2025  2:59 PM Chadd Monroe [R10.9] Abdominal pain     2025  2:59 PM Chadd Monroe [R19.7] Diarrhea                  Chadd Monroe MD  25 1500

## 2025-05-02 ENCOUNTER — OFFICE VISIT (OUTPATIENT)
Age: 35
End: 2025-05-02
Payer: COMMERCIAL

## 2025-05-02 ENCOUNTER — APPOINTMENT (OUTPATIENT)
Dept: PHYSICAL THERAPY | Age: 35
End: 2025-05-02
Payer: COMMERCIAL

## 2025-05-02 VITALS
HEIGHT: 63 IN | SYSTOLIC BLOOD PRESSURE: 112 MMHG | DIASTOLIC BLOOD PRESSURE: 64 MMHG | HEART RATE: 85 BPM | WEIGHT: 241 LBS | OXYGEN SATURATION: 98 % | TEMPERATURE: 98.3 F | BODY MASS INDEX: 42.7 KG/M2

## 2025-05-02 DIAGNOSIS — R39.9 UTI SYMPTOMS: Primary | ICD-10-CM

## 2025-05-02 LAB
BACTERIA UR QL AUTO: ABNORMAL /HPF
BILIRUB UR QL STRIP: NEGATIVE
CLARITY UR: ABNORMAL
COLOR UR: YELLOW
GLUCOSE UR STRIP-MCNC: NEGATIVE MG/DL
HGB UR QL STRIP.AUTO: NEGATIVE
KETONES UR STRIP-MCNC: NEGATIVE MG/DL
LEUKOCYTE ESTERASE UR QL STRIP: ABNORMAL
NITRITE UR QL STRIP: NEGATIVE
NON-SQ EPI CELLS URNS QL MICRO: ABNORMAL /HPF
PH UR STRIP.AUTO: 6.5 [PH]
PROT UR STRIP-MCNC: ABNORMAL MG/DL
RBC #/AREA URNS AUTO: ABNORMAL /HPF
SP GR UR STRIP.AUTO: 1.02 (ref 1–1.03)
UROBILINOGEN UR STRIP-ACNC: <2 MG/DL
WBC #/AREA URNS AUTO: ABNORMAL /HPF

## 2025-05-02 PROCEDURE — 87086 URINE CULTURE/COLONY COUNT: CPT | Performed by: PHYSICIAN ASSISTANT

## 2025-05-02 PROCEDURE — 99213 OFFICE O/P EST LOW 20 MIN: CPT | Performed by: PHYSICIAN ASSISTANT

## 2025-05-02 PROCEDURE — 81001 URINALYSIS AUTO W/SCOPE: CPT | Performed by: PHYSICIAN ASSISTANT

## 2025-05-02 RX ORDER — SULFAMETHOXAZOLE AND TRIMETHOPRIM 800; 160 MG/1; MG/1
1 TABLET ORAL 2 TIMES DAILY
Qty: 6 TABLET | Refills: 0 | Status: SHIPPED | OUTPATIENT
Start: 2025-05-02 | End: 2025-05-05

## 2025-05-02 NOTE — PROGRESS NOTES
"Name: Cruz Schneider      : 1990      MRN: [id number]  Encounter Provider: Aliyah Pimentel PA-C  Encounter Date: 2025   Encounter department: [facility] PRIMARY CARE  :  Assessment & Plan  UTI symptoms  -Urine dip done today does reveal leukocytes but no blood and no nitrates  - I did treat based on symptoms with Bactrim DS twice daily for 3 days  - Continue with increase clear liquids  - Urine will be sent for UA CNS  - I did advise her to go the ER with any increasing symptoms, call if there is no improvement with treatment  Orders:    UA w Reflex to Microscopic w Reflex to Culture; Future    Urine culture    sulfamethoxazole-trimethoprim (BACTRIM DS) 800-160 mg per tablet; Take 1 tablet by mouth 2 (two) times a day for 3 days    [employer] software was used to dictate this note. It may contain errors with dictating incorrect words/spelling. Please contact provider directly for any questions.          History of Present Illness   Patient presents today for an acute visit for an evaluation of urinary symptoms that started over the past several hours.  She states that she was dehydrated due to having diarrhea, last episode Wednesday, so she went to the ER and got IV fluids.  She states that when she woke up this morning she noticed pressure in the bladder area, increased urinary frequency and some dysuria.  She denies any fever, chills, flank pain, blood in her urine.  She states has been a while since she has been on any antibiotics.      Review of Systems   Constitutional:  Negative for chills and fever.   Genitourinary:  Positive for dysuria, frequency and urgency.       Objective   /64 (BP Location: Left arm, Patient Position: Sitting, Cuff Size: Large)   Pulse 85   Temp 98.3 °F (36.8 °C) (Temporal)   Ht 5' 3\" (1.6 m)   Wt 109 kg (241 lb)   SpO2 98%   BMI 42.69 kg/m²      Physical Exam  Vitals reviewed.   Constitutional:       General: She is not in acute distress.     " Appearance: Normal appearance. She is not ill-appearing, toxic-appearing or diaphoretic.   HENT:      Head: Normocephalic and atraumatic.   Cardiovascular:      Rate and Rhythm: Normal rate and regular rhythm.      Heart sounds: Normal heart sounds. No murmur heard.  Pulmonary:      Effort: Pulmonary effort is normal. No respiratory distress.      Breath sounds: Normal breath sounds. No wheezing, rhonchi or rales.   Abdominal:      General: Abdomen is flat. Bowel sounds are normal.      Palpations: Abdomen is soft.      Tenderness: There is no abdominal tenderness.   Musculoskeletal:      Cervical back: Neck supple.   Neurological:      General: No focal deficit present.      Mental Status: She is alert.   Psychiatric:         Mood and Affect: Mood normal.         Behavior: Behavior normal.         Thought Content: Thought content normal.         Judgment: Judgment normal.

## 2025-05-02 NOTE — ASSESSMENT & PLAN NOTE
-Urine dip done today does reveal leukocytes but no blood and no nitrates  - I did treat based on symptoms with Bactrim DS twice daily for 3 days  - Continue with increase clear liquids  - Urine will be sent for UA CNS  - I did advise her to go the ER with any increasing symptoms, call if there is no improvement with treatment  Orders:    UA w Reflex to Microscopic w Reflex to Culture; Future    Urine culture    sulfamethoxazole-trimethoprim (BACTRIM DS) 800-160 mg per tablet; Take 1 tablet by mouth 2 (two) times a day for 3 days

## 2025-05-03 LAB — BACTERIA UR CULT: NORMAL

## 2025-05-04 DIAGNOSIS — E66.01 MORBID OBESITY WITH BMI OF 40.0-44.9, ADULT (HCC): ICD-10-CM

## 2025-05-04 RX ORDER — SEMAGLUTIDE 0.25 MG/.5ML
0.25 INJECTION, SOLUTION SUBCUTANEOUS WEEKLY
Qty: 2 ML | Refills: 0 | Status: SHIPPED | OUTPATIENT
Start: 2025-05-04

## 2025-05-04 NOTE — PSYCH
Virtual Regular Visit    Verification of patient location:    Patient is located at Home in the following state in which I hold an active license [place]      Assessment/Plan:    Problem List Items Addressed This Visit          Cardiovascular and Mediastinum    Chronic migraine without aura without status migrainosus, not intractable    Relevant Medications    buPROPion (WELLBUTRIN XL) 300 mg 24 hr tablet    OLANZapine (ZyPREXA) 7.5 mg tablet    DULoxetine (Cymbalta) 30 mg delayed release capsule    DULoxetine (CYMBALTA) 60 mg delayed release capsule (Start on 5/19/2025)       Behavioral Health    Depression, major, recurrent, moderate (HCC) - Primary (Chronic)                  Relevant Medications    ALPRAZolam (XANAX) 0.5 mg tablet    buPROPion (WELLBUTRIN XL) 300 mg 24 hr tablet    OLANZapine (ZyPREXA) 7.5 mg tablet    DULoxetine (Cymbalta) 30 mg delayed release capsule    DULoxetine (CYMBALTA) 60 mg delayed release capsule (Start on 5/19/2025)    Generalized anxiety disorder (Chronic)                  Relevant Medications    ALPRAZolam (XANAX) 0.5 mg tablet    buPROPion (WELLBUTRIN XL) 300 mg 24 hr tablet    OLANZapine (ZyPREXA) 7.5 mg tablet    DULoxetine (Cymbalta) 30 mg delayed release capsule    DULoxetine (CYMBALTA) 60 mg delayed release capsule (Start on 5/19/2025)    Panic disorder without agoraphobia                  Relevant Medications    ALPRAZolam (XANAX) 0.5 mg tablet    buPROPion (WELLBUTRIN XL) 300 mg 24 hr tablet    OLANZapine (ZyPREXA) 7.5 mg tablet    DULoxetine (Cymbalta) 30 mg delayed release capsule    DULoxetine (CYMBALTA) 60 mg delayed release capsule (Start on 5/19/2025)       Surgery/Wound/Pain    Fibromyalgia    Relevant Medications    DULoxetine (Cymbalta) 30 mg delayed release capsule    DULoxetine (CYMBALTA) 60 mg delayed release capsule (Start on 5/19/2025)     Other Visit Diagnoses         Chronic post-traumatic stress disorder (PTSD)        Relevant Medications    ALPRAZolam  (XANAX) 0.5 mg tablet    prazosin (MINIPRESS) 2 mg capsule    buPROPion (WELLBUTRIN XL) 300 mg 24 hr tablet    OLANZapine (ZyPREXA) 7.5 mg tablet    DULoxetine (Cymbalta) 30 mg delayed release capsule    DULoxetine (CYMBALTA) 60 mg delayed release capsule (Start on 5/19/2025)                            Reason for visit is   Medication Management     Encounter provider Aliyah Keating MD    Provider located at [address] [address] [phone]      Recent Visits  No visits were found meeting these conditions.  Showing recent visits within past 7 days and meeting all other requirements  Today's Visits  Date Type Provider Dept   05/05/25 Telemedicine Aliyah Keating MD  [facility]   Showing today's visits and meeting all other requirements  Future Appointments  No visits were found meeting these conditions.  Showing future appointments within next 150 days and meeting all other requirements       The patient was identified by name and date of birth. Cruz Schneider was informed that this is a telemedicine visit and that the visit is being conducted throughthe Epic Embedded platform. She agrees to proceed..  My office door was closed. No one else was in the room.  She acknowledged consent and understanding of privacy and security of the video platform. The patient has agreed to participate and understands they can discontinue the visit at any time.    Patient is aware this is a billable service.     Subjective  Cruz Schneider is a 34 y.o. female with MDD,Panic do, BESSY .Stephanie remains compliant with her medications and denies side effects. No recent health changes or new medications.  She continues to meet with her counselor on a regular basis.      Patient stated she has been dealing with a lot of physical pain due to ankylosing spondylitis and plantar fasciitis.   She is also taking  medical cannabis which helps with her anxiety and also with her chronic pain.  Recently  her anxiety has been high and had Alprazolam increased to 0.5 mg po tid temporarily.   She has benefited from dose increase but still struggles with depressed mood.    She had  dose increase on Olanzapine to 7.5 mg po qhs. Lipids and A1C ordered and still pending.  She feels better after dose increase.  She  had trial of Phentermine for weight loss but experienced side effcts. She started trial of Wegovy instead.    Since last December she had cholecystectomy and her mother had hysterectomy and they are both recovering well.  She feels her mood stable.    Last seen was 3 months ago, since then she has been depressed and feeling hopeless about her physical symptoms . The rheumatologist had nothing to offer to help her symptoms.  Offered trial of Duloxetine and she agreed. Will taper off Lexapro by reducing dose to 10 mg for 2 weeks while cross tapering and starting Duloxetine 30 mg po qam for 2 weeks , then stop Lexapro and increase Duloxetine to 60 mg daily.  Schedule follow up in 4 weeks.             Allergies   Allergen Reactions    Cimzia [Certolizumab Pegol] Swelling     Face and hands    Desvenlafaxine Other (See Comments)     Other reaction(s): state of confusion    Ixekizumab Vomiting    Seasonal Ic [Octacosanol] Nasal Congestion    Tizanidine Anxiety     Panic attacks       Review of Systems    Mood Anxiety and Depression   Behavior Normal    Thought Content Disturbing Thoughts, Feelings   General Emotional Problems and Decreased Functioning   Personality Normal   Other Psych Symptoms Normal   Constitutional Negative   ENT Negative   Cardiovascular Negative   Respiratory Negative   Gastrointestinal Negative   Genitourinary Negative   Musculoskeletal Negative   Integumentary Negative   Neurological Negative   Endocrine Normal    Other Symptoms Normal        Laboratory Results:   Recent Labs (last 12 months):   Office  Visit on 05/02/2025   Component Date Value    Urine Culture 05/02/2025 No Growth <1000 cfu/mL     Color, UA 05/02/2025 Yellow     Clarity, UA 05/02/2025 Turbid     Specific Gravity, UA 05/02/2025 1.018     pH, UA 05/02/2025 6.5     Leukocytes, UA 05/02/2025 Large (A)     Nitrite, UA 05/02/2025 Negative     Protein, UA 05/02/2025 Trace (A)     Glucose, UA 05/02/2025 Negative     Ketones, UA 05/02/2025 Negative     Urobilinogen, UA 05/02/2025 <2.0     Bilirubin, UA 05/02/2025 Negative     Occult Blood, UA 05/02/2025 Negative     RBC, UA 05/02/2025 4-10 (A)     WBC, UA 05/02/2025 Innumerable (A)     Epithelial Cells 05/02/2025 Moderate (A)     Bacteria, UA 05/02/2025 Occasional    Admission on 05/01/2025, Discharged on 05/01/2025   Component Date Value    WBC 05/01/2025 13.53 (H)     RBC 05/01/2025 4.47     Hemoglobin 05/01/2025 12.8     Hematocrit 05/01/2025 38.5     MCV 05/01/2025 86     MCH 05/01/2025 28.6     MCHC 05/01/2025 33.2     RDW 05/01/2025 13.9     MPV 05/01/2025 8.5 (L)     Platelets 05/01/2025 532 (H)     nRBC 05/01/2025 0     Segmented % 05/01/2025 60     Immature Grans % 05/01/2025 0     Lymphocytes % 05/01/2025 32     Monocytes % 05/01/2025 7     Eosinophils Relative 05/01/2025 1     Basophils Relative 05/01/2025 0     Absolute Neutrophils 05/01/2025 8.04 (H)     Absolute Immature Grans 05/01/2025 0.04     Absolute Lymphocytes 05/01/2025 4.29     Absolute Monocytes 05/01/2025 0.93     Eosinophils Absolute 05/01/2025 0.18     Basophils Absolute 05/01/2025 0.05     Sodium 05/01/2025 136     Potassium 05/01/2025 4.1     Chloride 05/01/2025 104     CO2 05/01/2025 23     ANION GAP 05/01/2025 9     BUN 05/01/2025 8     Creatinine 05/01/2025 0.72     Glucose 05/01/2025 95     Calcium 05/01/2025 9.1     AST 05/01/2025 15     ALT 05/01/2025 14     Alkaline Phosphatase 05/01/2025 63     Total Protein 05/01/2025 7.5     Albumin 05/01/2025 3.9     Total Bilirubin 05/01/2025 0.65     eGFR 05/01/2025 110     Lipase  05/01/2025 23     EXT Preg Test, Ur 05/01/2025 Negative     Control 05/01/2025 Valid    Appointment on 04/22/2025   Component Date Value    WBC 04/22/2025 9.39     RBC 04/22/2025 4.27     Hemoglobin 04/22/2025 12.0     Hematocrit 04/22/2025 37.2     MCV 04/22/2025 87     MCH 04/22/2025 28.1     MCHC 04/22/2025 32.3     RDW 04/22/2025 14.3     MPV 04/22/2025 9.1     Platelets 04/22/2025 492 (H)     nRBC 04/22/2025 0     Segmented % 04/22/2025 47     Immature Grans % 04/22/2025 0     Lymphocytes % 04/22/2025 42     Monocytes % 04/22/2025 7     Eosinophils Relative 04/22/2025 3     Basophils Relative 04/22/2025 1     Absolute Neutrophils 04/22/2025 4.46     Absolute Immature Grans 04/22/2025 0.03     Absolute Lymphocytes 04/22/2025 3.97     Absolute Monocytes 04/22/2025 0.62     Eosinophils Absolute 04/22/2025 0.26     Basophils Absolute 04/22/2025 0.05     Sodium 04/22/2025 139     Potassium 04/22/2025 3.5     Chloride 04/22/2025 105     CO2 04/22/2025 23     ANION GAP 04/22/2025 11     BUN 04/22/2025 6     Creatinine 04/22/2025 0.64     Glucose, Fasting 04/22/2025 85     Calcium 04/22/2025 9.0     AST 04/22/2025 10 (L)     ALT 04/22/2025 8     Alkaline Phosphatase 04/22/2025 73     Total Protein 04/22/2025 6.7     Albumin 04/22/2025 3.6     Total Bilirubin 04/22/2025 0.47     eGFR 04/22/2025 117     TSH 3RD GENERATON 04/22/2025 2.168     Cholesterol 04/22/2025 207 (H)     Triglycerides 04/22/2025 241 (H)     HDL, Direct 04/22/2025 57     LDL Calculated 04/22/2025 102 (H)     Non-HDL-Chol (CHOL-HDL) 04/22/2025 150     Vit D, 25-Hydroxy 04/22/2025 22.1 (L)     Vitamin B-12 04/22/2025 150 (L)     Magnesium 04/22/2025 2.0     Cortisol - AM 04/22/2025 16.7     Iron Saturation 04/22/2025 12 (L)     TIBC 04/22/2025 485.8 (H)     Iron 04/22/2025 57     Transferrin 04/22/2025 347     UIBC 04/22/2025 429 (H)     CRP 04/22/2025 14.5 (H)    Appointment on 02/21/2025   Component Date Value    Hemoglobin A1C 02/21/2025 5.8 (H)      EAG 02/21/2025 120     Sodium 02/21/2025 138     Potassium 02/21/2025 4.0     Chloride 02/21/2025 104     CO2 02/21/2025 21     ANION GAP 02/21/2025 13     BUN 02/21/2025 9     Creatinine 02/21/2025 0.67     Glucose, Fasting 02/21/2025 96     Calcium 02/21/2025 9.0     AST 02/21/2025 13     ALT 02/21/2025 9     Alkaline Phosphatase 02/21/2025 75     Total Protein 02/21/2025 6.9     Albumin 02/21/2025 3.5     Total Bilirubin 02/21/2025 0.47     eGFR 02/21/2025 115     Cholesterol 02/21/2025 210 (H)     Triglycerides 02/21/2025 182 (H)     HDL, Direct 02/21/2025 52     LDL Calculated 02/21/2025 122 (H)    Procedure visit on 01/07/2025   Component Date Value    Case Report 01/07/2025                      Value:Surgical Pathology Report                         Case: [id number]                                  Authorizing Provider:  Oni Beckham DO           Collected:           01/07/2025 [facility]              Ordering Location:     [facility]      Received:            01/07/2025 [facility]                                     Primary Care                                                                 Pathologist:           Ulices Holcomb MD                                                      Specimens:   A) - Eye, Left, next to eye lateral                                                                 B) - Eye, Left, next to eye - medial                                                       Final Diagnosis 01/07/2025                      Value:A. Skin, next to left eye lateral:  VERRUCA VULGARIS       B. Skin, next to left eye medial:   VERRUCA VULGARIS          Note 01/07/2025                      Value:Interpretation performed at [facility] [address] [place] [address] Way, [place] [address]        Additional Information 01/07/2025                      Value:All reported additional testing was performed with appropriately reactive controls.  These tests were developed and their performance characteristics  "determined by [facility] or appropriate performing facility, though some tests may be performed on tissues which have not been validated for performance characteristics (such as staining performed on alcohol exposed cell blocks and decalcified tissues).  Results should be interpreted with caution and in the context of the patients’ clinical condition. These tests may not be cleared or approved by the U.S. Food and Drug Administration, though the FDA has determined that such clearance or approval is not necessary. These tests are used for clinical purposes and they should not be regarded as investigational or for research. This laboratory has been approved by IA 88, designated as a high-complexity laboratory and is qualified to perform these tests.  .      Gross Description 01/07/2025                      Value:A. The specimen is received in formalin, labeled with the patient's name and hospital number, and is designated \" eye left, next to eye lateral\".  The specimen consists of a tan portion of skin measuring 0.1 x 0.1 by less than 0.1 cm.  1 surface is inked red and the opposite surface is inked green.  Entirely submitted. One cassette.  Between sponges.  B. The specimen is received in formalin, labeled with the patient's name and hospital number, and is designated \" eye, left, next to eye-medial\".  The specimen consists of a tan-white portion of skin measuring 0.2 x 0.1 by less than 0.1 cm.  1 surface is inked red which upon manipulation is split into 2 pieces and entirely submitted between sponges in 1 cassette.    Note: The estimated total formalin fixation time based upon information provided by the submitting clinician and the standard processing schedule is under 72 hours. Ericaduke        Case Report 01/07/2025                      Value:Surgical Pathology Report                         Case: [id number]                                  Authorizing Provider:  Oni Beckham DO           " Collected:           01/07/2025 1044              Ordering Location:     [facility]      Received:            01/07/2025 1044                                     Primary Care                                                                 Pathologist:           Ulices Holcomb MD                                                      Specimens:   A) - Eye, Left, next to eye lateral                                                                 B) - Eye, Left, next to eye - medial                                                       Final Diagnosis 01/07/2025                      Value:A. Skin, next to left eye lateral:  VERRUCA VULGARIS       B. Skin, next to left eye medial:   VERRUCA VULGARIS          Note 01/07/2025                      Value:Interpretation performed at [facility] [address] [place] [address] [place] [address]        Additional Information 01/07/2025                      Value:All reported additional testing was performed with appropriately reactive controls.  These tests were developed and their performance characteristics determined by [facility] or appropriate performing facility, though some tests may be performed on tissues which have not been validated for performance characteristics (such as staining performed on alcohol exposed cell blocks and decalcified tissues).  Results should be interpreted with caution and in the context of the patients’ clinical condition. These tests may not be cleared or approved by the U.S. Food and Drug Administration, though the FDA has determined that such clearance or approval is not necessary. These tests are used for clinical purposes and they should not be regarded as investigational or for research. This laboratory has been approved by CLIA 88, designated as a high-complexity laboratory and is qualified to perform these tests.  .      Gross Description 01/07/2025                      Value:A. The specimen is received  "in formalin, labeled with the patient's name and hospital number, and is designated \" eye left, next to eye lateral\".  The specimen consists of a tan portion of skin measuring 0.1 x 0.1 by less than 0.1 cm.  1 surface is inked red and the opposite surface is inked green.  Entirely submitted. One cassette.  Between sponges.  B. The specimen is received in formalin, labeled with the patient's name and hospital number, and is designated \" eye, left, next to eye-medial\".  The specimen consists of a tan-white portion of skin measuring 0.2 x 0.1 by less than 0.1 cm.  1 surface is inked red which upon manipulation is split into 2 pieces and entirely submitted between sponges in 1 cassette.    Note: The estimated total formalin fixation time based upon information provided by the submitting clinician and the standard processing schedule is under 72 hours. AKemmerer       Admission on 12/26/2024, Discharged on 12/26/2024   Component Date Value    EXT Preg Test, Ur 12/26/2024 Negative     Control 12/26/2024 Valid     Case Report 12/26/2024                      Value:Surgical Pathology Report                         Case: [id number]                                  Authorizing Provider:  Kulwinder Nunn MD       Collected:           12/26/2024 0814              Ordering Location:     [facility] Received:            12/26/2024 1009                                     Operating Room                                                               Pathologist:           Marcelo Elise MD                                                                 Specimen:    Gallbladder                                                                                Final Diagnosis 12/26/2024                      Value:A. Gallbladder, cholecystectomy:  - Chronic cholecystitis, cholesterolosis.  - One benign lymph node (0/1).        Note 12/26/2024                      Value:Interpretation performed at [facility], [facility] [address] " "[place] [address]        Additional Information 12/26/2024                      Value:All reported additional testing was performed with appropriately reactive controls.  These tests were developed and their performance characteristics determined by [facility] or appropriate performing facility, though some tests may be performed on tissues which have not been validated for performance characteristics (such as staining performed on alcohol exposed cell blocks and decalcified tissues).  Results should be interpreted with caution and in the context of the patients’ clinical condition. These tests may not be cleared or approved by the U.S. Food and Drug Administration, though the FDA has determined that such clearance or approval is not necessary. These tests are used for clinical purposes and they should not be regarded as investigational or for research. This laboratory has been approved by CLIA 88, designated as a high-complexity laboratory and is qualified to perform these tests.  .      Gross Description 12/26/2024                      Value:A. The specimen is received in formalin, labeled with the patient's name and hospital number, and is designated \" gallbladder.\"  The specimen consists of a grossly intact purple to tan gallbladder that is 5.9 x 2.6 x 2.6 cm.  The surface has areas of attached yellow fat in brown to tan fibrous tissue at the area previous resection.  The staple cystic duct margin is inked blue, and submitted with sections.  The gallbladder is opened revealing dark green viscous bile with no stones.  The wall thickness is up to 0.2 cm.  Mucosa is dark green and velvety with gold flecking.  There is a single tan rubbery density near the cystic duct margin measuring up to 0.8 cm.  Representative sections including the density are submitted in 1 cassette.  Note: The estimated total formalin fixation time based upon information provided by the submitting clinician and the " standard processing schedule is under 72 hours.  TStevens     Annual Exam on 11/19/2024   Component Date Value    Case Report 11/19/2024                      Value:Gynecologic Cytology Report                       Case: [id number]                                  Authorizing Provider:  Ignacia López MD  Collected:           11/19/2024 1451              Ordering Location:     [facility]  Received:            11/19/2024 1451                                     [facility]                                                           First Screen:          Laureen Hernandez, CT                                                    Specimen:    LIQUID-BASED PAP, SCREENING, Cervix, Endocervical                                          Primary Interpretation 11/19/2024 Negative for intraepithelial lesion or malignancy     Specimen Adequacy 11/19/2024 Satisfactory for evaluation. Endocervical/transformation zone component present.     Note 11/19/2024                      Value:Screening performed at [facility], [address] [facility] [address].        Additional Information 11/19/2024                      Value:[employer]'s FDA approved ,  and ThinPrep Imaging Duo System are utilized with strict adherence to the 's instruction manual to prepare gynecologic and non-gynecologic cytology specimens for the production of ThinPrep slides as well as for gynecologic ThinPrep imaging. These processes have been validated by our laboratory and/or by the .  The Pap test is not a diagnostic procedure and should not be used as the sole means to detect cervical cancer. It is only a screening procedure to aid in the detection of cervical cancer and its precursors. Both false-negative and false-positive results have been experienced. Your patient's test result should be interpreted in this context together with the history and clinical findings.      Gross  "Description 11/19/2024                      Value:A. 20 ml , colorless, cloudy received in a ThinPrep vial.      HPV Other HR 11/19/2024 Negative     HPV16 11/19/2024 Negative     HPV18 11/19/2024 Negative    Hospital Outpatient Visit on 11/01/2024   Component Date Value    EXT Preg Test, Ur 11/01/2024 Negative     Control 11/01/2024 Valid     Case Report 11/01/2024                      Value:Surgical Pathology Report                         Case: [id number]                                  Authorizing Provider:  Benitez Whitney DO              Collected:           11/01/2024 1046              Ordering Location:     [facility] Received:            11/01/2024 1130                                     Endoscopy                                                                    Pathologist:           Filemon Johnson MD                                                           Specimens:   A) - Terminal Ileum, bx r/o ibd                                                                     B) - Colon, random bx r/o ibd                                                              Final Diagnosis 11/01/2024                      Value:A. Small intestine, \"Terminal ileum rule out IBD,\" Biopsy:  - Fragments of small intestinal mucosa with intact villous architecture and no increase in intraepithelial lymphocytes  - Negative for chronic or active ileitis, dysplasia or malignancy    B. Colon, \"Random colon biopsy rule out IBD,\" Biopsy:  - Benign colonic mucosa with intact glandular architecture  - Negative for lymphocytic, collagenous, or active colitis  - Negative for acute colitis  - Negative for granulomas, dysplasia, or carcinoma        Additional Information 11/01/2024                      Value:All reported additional testing was performed with appropriately reactive controls.  These tests were developed and their performance characteristics determined by [facility] or appropriate performing " "facility, though some tests may be performed on tissues which have not been validated for performance characteristics (such as staining performed on alcohol exposed cell blocks and decalcified tissues).  Results should be interpreted with caution and in the context of the patients’ clinical condition. These tests may not be cleared or approved by the U.S. Food and Drug Administration, though the FDA has determined that such clearance or approval is not necessary. These tests are used for clinical purposes and they should not be regarded as investigational or for research. This laboratory has been approved by CLIA 88, designated as a high-complexity laboratory and is qualified to perform these tests.  .Interpretation performed at [facility], [address] [facility] [address]        Gross Description 11/01/2024                      Value:A. The specimen is received in formalin, labeled with the patient's name and hospital number, and is designated \" terminal ileum rule out IBD\".  The specimen consists of 3 pale-tan soft tissue fragments measuring 0.3, 0.4 and 0.5 cm.  Entirely submitted. Screened cassette.  B. The specimen is received in formalin, labeled with the patient's name and hospital number, and is designated \" random colon biopsy rule out IBD\".  The specimen consists of multiple tan soft tissue fragments measuring in aggregate 0.6 x 0.6 x 0.1 cm.  Entirely submitted. Screened cassette.    Note: The estimated total formalin fixation time based upon information provided by the submitting clinician and the standard processing schedule is over 72 hours.    [facility]      Clinical Information 11/01/2024                      Value:FINDINGS:  · 2 cm sliding hiatal hernia (type I hiatal hernia) without Flako lesions present - GE junction 35 cm from the incisors, diaphragmatic impression 37 cm from the incisors, confirmed by retroflexion:  Hill classification: Grade I  · The upper third " of the esophagus, middle third of the esophagus and lower third of the esophagus appeared normal. Z-line is 35 cm from the incisors.  · The fundus of the stomach, body of the stomach, greater curve of the stomach, lesser curve of the stomach, incisura, antrum, prepyloric region and pylorus appeared normal. Previous biopsies negative.  Repeat biopsies not performed.  · The duodenal bulb, 1st part of the duodenum, 2nd part of the duodenum and major papilla appeared normal. Previous biopsies negative.  Repeat biopsies not performed.    FINDINGS:  · The terminal ileum and entire colon appeared normal.  · Performed multiple forceps biopsies in the terminal ileum to rule out IBD  · Performed multiple pancolonic forceps biopsies to rule                           out IBD     Admission on 10/13/2024, Discharged on 10/13/2024   Component Date Value    WBC 10/13/2024 10.75 (H)     RBC 10/13/2024 4.88     Hemoglobin 10/13/2024 13.7     Hematocrit 10/13/2024 41.8     MCV 10/13/2024 86     MCH 10/13/2024 28.1     MCHC 10/13/2024 32.8     RDW 10/13/2024 13.5     MPV 10/13/2024 8.7 (L)     Platelets 10/13/2024 494 (H)     nRBC 10/13/2024 0     Segmented % 10/13/2024 73     Immature Grans % 10/13/2024 0     Lymphocytes % 10/13/2024 18     Monocytes % 10/13/2024 7     Eosinophils Relative 10/13/2024 1     Basophils Relative 10/13/2024 1     Absolute Neutrophils 10/13/2024 7.90 (H)     Absolute Immature Grans 10/13/2024 0.02     Absolute Lymphocytes 10/13/2024 1.95     Absolute Monocytes 10/13/2024 0.72     Eosinophils Absolute 10/13/2024 0.10     Basophils Absolute 10/13/2024 0.06     Sodium 10/13/2024 134 (L)     Potassium 10/13/2024 3.7     Chloride 10/13/2024 103     CO2 10/13/2024 22     ANION GAP 10/13/2024 9     BUN 10/13/2024 7     Creatinine 10/13/2024 0.73     Glucose 10/13/2024 98     Calcium 10/13/2024 9.4     AST 10/13/2024 15     ALT 10/13/2024 15     Alkaline Phosphatase 10/13/2024 66     Total Protein 10/13/2024 7.7      Albumin 10/13/2024 4.0     Total Bilirubin 10/13/2024 0.60     eGFR 10/13/2024 108     Lipase 10/13/2024 27     SARS COV Rapid Antigen 10/13/2024 Negative     Influenza A Rapid Antigen 10/13/2024 Negative     Influenza B Rapid Antigen 10/13/2024 Negative     Color, UA 10/13/2024 Yellow     Clarity, UA 10/13/2024 Clear     Specific Gravity, UA 10/13/2024 1.025     pH, UA 10/13/2024 6.0     Leukocytes, UA 10/13/2024 Negative     Nitrite, UA 10/13/2024 Negative     Protein, UA 10/13/2024 Negative     Glucose, UA 10/13/2024 Negative     Ketones, UA 10/13/2024 15 (1+) (A)     Urobilinogen, UA 10/13/2024 0.2     Bilirubin, UA 10/13/2024 Negative     Occult Blood, UA 10/13/2024 Negative     Urine Culture 10/13/2024 10,000-19,000 cfu/ml    Admission on 10/10/2024, Discharged on 10/10/2024   Component Date Value    Color, UA 10/10/2024 Yellow     Clarity, UA 10/10/2024 Clear     Specific Gravity, UA 10/10/2024 1.020     pH, UA 10/10/2024 6.5     Leukocytes, UA 10/10/2024 1+ (A)     Nitrite, UA 10/10/2024 Negative     Protein, UA 10/10/2024 Negative     Glucose, UA 10/10/2024 Negative     Ketones, UA 10/10/2024 Negative     Urobilinogen, UA 10/10/2024 0.2     Bilirubin, UA 10/10/2024 Negative     Occult Blood, UA 10/10/2024 Negative     EXT Preg Test, Ur 10/10/2024 Negative     Control 10/10/2024 Valid     WBC 10/10/2024 8.30     RBC 10/10/2024 4.21     Hemoglobin 10/10/2024 11.8     Hematocrit 10/10/2024 36.4     MCV 10/10/2024 87     MCH 10/10/2024 28.0     MCHC 10/10/2024 32.4     RDW 10/10/2024 13.6     MPV 10/10/2024 8.9     Platelets 10/10/2024 447 (H)     nRBC 10/10/2024 0     Segmented % 10/10/2024 47     Immature Grans % 10/10/2024 0     Lymphocytes % 10/10/2024 41     Monocytes % 10/10/2024 8     Eosinophils Relative 10/10/2024 4     Basophils Relative 10/10/2024 0     Absolute Neutrophils 10/10/2024 3.85     Absolute Immature Grans 10/10/2024 0.01     Absolute Lymphocytes 10/10/2024 3.42     Absolute Monocytes  10/10/2024 0.70     Eosinophils Absolute 10/10/2024 0.29     Basophils Absolute 10/10/2024 0.03     Sodium 10/10/2024 136     Potassium 10/10/2024 3.6     Chloride 10/10/2024 106     CO2 10/10/2024 20 (L)     ANION GAP 10/10/2024 10     BUN 10/10/2024 9     Creatinine 10/10/2024 0.53 (L)     Glucose 10/10/2024 103     Calcium 10/10/2024 8.9     AST 10/10/2024 10 (L)     ALT 10/10/2024 13     Alkaline Phosphatase 10/10/2024 56     Total Protein 10/10/2024 6.6     Albumin 10/10/2024 3.6     Total Bilirubin 10/10/2024 0.31     eGFR 10/10/2024 125     Magnesium 10/10/2024 1.9     Lipase 10/10/2024 34     RBC, UA 10/10/2024 1-2     WBC, UA 10/10/2024 10-20 (A)     Epithelial Cells 10/10/2024 Moderate (A)     Bacteria, UA 10/10/2024 Occasional     Urine Culture 10/10/2024 >100,000 cfu/ml Beta Hemolytic Streptococcus Group B (A)    There may be more visits with results that are not included.     Substance Abuse History:  Social History     Substance and Sexual Activity   Drug Use Yes    Types: Marijuana    Comment: medical marijuana -smokes flower       Family Psychiatric History:   Family History   Problem Relation Age of Onset    Rheum arthritis Mother     Psoriasis Mother     Other Mother     Hypertension Mother     Diabetes unspecified Mother     Sjogren's syndrome Mother     Alcohol abuse Mother     Drug abuse Mother     Anxiety disorder Father     Alcohol abuse Father     Lung cancer Maternal Grandfather     Cancer Other         bone    Diabetes Other     Other Other         High blood pressure    Depression Maternal Grandmother        The following portions of the patient's history were reviewed and updated as appropriate: allergies, current medications, past family history, past medical history, past social history, past surgical history and problem list.    Social History     Socioeconomic History    Marital status: Single     Spouse name: Not on file    Number of children: 0    Years of education: 12    Highest  education level: Not on file   Occupational History    Occupation: Unemployed     Employer: [employer]   Tobacco Use    Smoking status: Never     Passive exposure: Past    Smokeless tobacco: Never   Vaping Use    Vaping status: Never Used   Substance and Sexual Activity    Alcohol use: Not Currently     Comment: rarely    Drug use: Yes     Types: Marijuana     Comment: medical marijuana -smokes flower    Sexual activity: Not Currently     Partners: Male     Birth control/protection: Ring     Comment: Nuva ring   Other Topics Concern    Not on file   Social History Narrative    Home:  Living with mom and MGM        Education:    Pt denies any h/o learning disability Dxs but admits to having great difficulty in math requiring a .  She reached childhood milestones on time as far as he knows.    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 On reflection, she feels it was moreso her anxiety as the cause of her leaving school, and she was using the other reason as an excuse so she would not have to admit to anxiety at that time.  She is now very open about her anxiety and does not mind that I have this information in the general social section of her chart.     Social Drivers of Health     Financial Resource Strain: Low Risk  (8/16/2022)    Overall Financial Resource Strain (CARDIA)     Difficulty of Paying Living Expenses: Not hard at all   Food Insecurity: No Food Insecurity (8/16/2022)    Hunger Vital Sign     Worried About Running Out of Food in the Last Year: Never true     Ran Out of Food in the Last Year: Never true   Transportation Needs: No Transportation Needs (8/16/2022)    PRAPARE - Transportation     Lack of Transportation (Medical): No     Lack of Transportation (Non-Medical): No   Physical  Activity: Unknown (2/11/2021)    Exercise Vital Sign     Days of Exercise per Week: 0 days     Minutes of Exercise per Session: Not on file   Stress: Not on file   Social Connections: Not on file   Intimate Partner Violence: Not on file   Housing Stability: Not on file     Social History     Social History Narrative    Home:  Living with mom and MGM        Education:    Pt denies any h/o learning disability Dxs but admits to having great difficulty in math requiring a .  She reached childhood milestones on time as far as he knows.    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 On reflection, she feels it was moreso her anxiety as the cause of her leaving school, and she was using the other reason as an excuse so she would not have to admit to anxiety at that time.  She is now very open about her anxiety and does not mind that I have this information in the general social section of her chart.       Objective:       Mental status:  Appearance calm and cooperative , adequate hygiene and grooming and good eye contact    Mood dysphoric   Affect affect was constricted   Speech a normal rate and fluent   Thought Processes coherent/organized and normal thought processes   Hallucinations no hallucinations present    Thought Content no delusions   Abnormal Thoughts no suicidal thoughts  and no homicidal thoughts    Orientation  oriented to person and place and time   Remote Memory short term memory intact and long term memory intact   Attention Span concentration intact   Intellect Appears to be of Average Intelligence   Insight Limited insight   Judgement judgment was limited   Muscle Strength n/a   Language no difficulty naming common objects and no difficulty repeating a phrase    Fund of Knowledge displays  adequate knowledge of current events, adequate fund of knowledge regarding past history and adequate fund of knowledge regarding vocabulary                Assessment/Plan:       Diagnoses and all orders for this visit:    Depression, major, recurrent, moderate (HCC)  -     DULoxetine (Cymbalta) 30 mg delayed release capsule; Take 1 capsule (30 mg total) by mouth daily  -     DULoxetine (CYMBALTA) 60 mg delayed release capsule; Take 1 capsule (60 mg total) by mouth daily Do not start before May 19, 2025.    Generalized anxiety disorder  -     ALPRAZolam (XANAX) 0.5 mg tablet; Take 1 tablet (0.5 mg total) by mouth 3 (three) times a day as needed for anxiety  -     buPROPion (WELLBUTRIN XL) 300 mg 24 hr tablet; Take 1 tablet (300 mg total) by mouth daily    Panic disorder without agoraphobia    Chronic post-traumatic stress disorder (PTSD)  -     prazosin (MINIPRESS) 2 mg capsule; Take 1 capsule (2 mg total) by mouth daily at bedtime    Chronic migraine without aura without status migrainosus, not intractable  -     OLANZapine (ZyPREXA) 7.5 mg tablet; Take 1 tablet (7.5 mg total) by mouth daily at bedtime Do not take with reglan.    Fibromyalgia  -     DULoxetine (Cymbalta) 30 mg delayed release capsule; Take 1 capsule (30 mg total) by mouth daily  -     DULoxetine (CYMBALTA) 60 mg delayed release capsule; Take 1 capsule (60 mg total) by mouth daily Do not start before May 19, 2025.          Assessment & Plan  Depression, major, recurrent, moderate (HCC)    Orders:    DULoxetine (Cymbalta) 30 mg delayed release capsule; Take 1 capsule (30 mg total) by mouth daily    DULoxetine (CYMBALTA) 60 mg delayed release capsule; Take 1 capsule (60 mg total) by mouth daily Do not start before May 19, 2025.    Generalized anxiety disorder    Orders:    ALPRAZolam (XANAX) 0.5 mg tablet; Take 1 tablet (0.5 mg total) by mouth 3 (three) times a day as needed for anxiety    buPROPion (WELLBUTRIN XL) 300 mg 24 hr tablet; Take 1 tablet  (300 mg total) by mouth daily    Panic disorder without agoraphobia         Chronic post-traumatic stress disorder (PTSD)    Orders:    prazosin (MINIPRESS) 2 mg capsule; Take 1 capsule (2 mg total) by mouth daily at bedtime    Chronic migraine without aura without status migrainosus, not intractable    Orders:    OLANZapine (ZyPREXA) 7.5 mg tablet; Take 1 tablet (7.5 mg total) by mouth daily at bedtime Do not take with reglan.    Fibromyalgia    Orders:    DULoxetine (Cymbalta) 30 mg delayed release capsule; Take 1 capsule (30 mg total) by mouth daily    DULoxetine (CYMBALTA) 60 mg delayed release capsule; Take 1 capsule (60 mg total) by mouth daily Do not start before May 19, 2025.                     Treatment Recommendations- Risks Benefits      Immediate Medical/Psychiatric/Psychotherapy Treatments and Any Precautions: as stated on HPI     Risks, Benefits And Possible Side Effects Of Medications:  {PSYCH RISK, BENEFITS AND POSSIBLE SIDE EFFECTS (Optional):13649    Controlled Medication Discussion: Discussed with patient Black Box warning on concurrent use of benzodiazepines and opioid medications including sedation, respiratory depression, coma and death. Patient understands the risk of treatment with benzodiazepines in addition to opioids and wants to continue taking those medications. , Discussed with patient the risks of sedation, respiratory depression, impairment of ability to drive and potential for abuse and addiction related to treatment with benzodiazepine medications. The patient understands risk of treatment with benzodiazepine medications, agrees to not drive if feels impaired and agrees to take medications as prescribed. and The patient has been filling controlled prescriptions on time as prescribed to [place] Prescription Drug Monitoring program.      Psychotherapy Provided:    No    The Patient is located at Home and in the following state in which I hold an active license [place].    The patient  was identified by name and date of birth. Patient  was informed that this is a telemedicine visit and that the visit is being conducted through the Epic Embedded platform. She agrees to proceed..  My office door was closed. No one else was in the room.  She acknowledged consent and understanding of privacy and security of the video platform. The patient has agreed to participate and understands they can discontinue the visit at any time.    I have spent a total time of 30 minutes in caring for this patient on the day of the visit/encounter including Prognosis, Risks and benefits of tx options, Instructions for management, Patient and family education, Importance of tx compliance, Risk factor reductions, Impressions, Documenting in the medical record, Reviewing/placing orders in the medical record (including tests, medications, and/or procedures), and Obtaining or reviewing history  , not including the time spent for establishing the audio/video connection.          Visit Time    Visit Start Time: 1:30  Visit Stop Time: 2:00  Total Visit Duration:  30 minutes

## 2025-05-05 ENCOUNTER — TELEMEDICINE (OUTPATIENT)
Dept: PSYCHIATRY | Facility: CLINIC | Age: 35
End: 2025-05-05
Payer: COMMERCIAL

## 2025-05-05 DIAGNOSIS — G43.709 CHRONIC MIGRAINE WITHOUT AURA WITHOUT STATUS MIGRAINOSUS, NOT INTRACTABLE: ICD-10-CM

## 2025-05-05 DIAGNOSIS — F43.12 CHRONIC POST-TRAUMATIC STRESS DISORDER (PTSD): ICD-10-CM

## 2025-05-05 DIAGNOSIS — F41.0 PANIC DISORDER WITHOUT AGORAPHOBIA: ICD-10-CM

## 2025-05-05 DIAGNOSIS — M79.7 FIBROMYALGIA: ICD-10-CM

## 2025-05-05 DIAGNOSIS — F33.1 DEPRESSION, MAJOR, RECURRENT, MODERATE (HCC): Primary | Chronic | ICD-10-CM

## 2025-05-05 DIAGNOSIS — F41.1 GENERALIZED ANXIETY DISORDER: Chronic | ICD-10-CM

## 2025-05-05 PROCEDURE — 99214 OFFICE O/P EST MOD 30 MIN: CPT | Performed by: PSYCHIATRY & NEUROLOGY

## 2025-05-05 RX ORDER — BUPROPION HYDROCHLORIDE 300 MG/1
300 TABLET ORAL DAILY
Qty: 90 TABLET | Refills: 0 | Status: SHIPPED | OUTPATIENT
Start: 2025-05-05

## 2025-05-05 RX ORDER — ALPRAZOLAM 0.5 MG
0.5 TABLET ORAL 3 TIMES DAILY PRN
Qty: 90 TABLET | Refills: 2 | Status: SHIPPED | OUTPATIENT
Start: 2025-05-05

## 2025-05-05 RX ORDER — PRAZOSIN HYDROCHLORIDE 2 MG/1
2 CAPSULE ORAL
Qty: 90 CAPSULE | Refills: 0 | Status: SHIPPED | OUTPATIENT
Start: 2025-05-05

## 2025-05-05 RX ORDER — DULOXETIN HYDROCHLORIDE 30 MG/1
30 CAPSULE, DELAYED RELEASE ORAL DAILY
Qty: 14 CAPSULE | Refills: 0 | Status: SHIPPED | OUTPATIENT
Start: 2025-05-05

## 2025-05-05 RX ORDER — DULOXETIN HYDROCHLORIDE 60 MG/1
60 CAPSULE, DELAYED RELEASE ORAL DAILY
Qty: 30 CAPSULE | Refills: 2 | Status: SHIPPED | OUTPATIENT
Start: 2025-05-19

## 2025-05-05 RX ORDER — OLANZAPINE 7.5 MG/1
7.5 TABLET, FILM COATED ORAL
Qty: 90 TABLET | Refills: 0 | Status: SHIPPED | OUTPATIENT
Start: 2025-05-05

## 2025-05-05 NOTE — ASSESSMENT & PLAN NOTE
Orders:    ALPRAZolam (XANAX) 0.5 mg tablet; Take 1 tablet (0.5 mg total) by mouth 3 (three) times a day as needed for anxiety    buPROPion (WELLBUTRIN XL) 300 mg 24 hr tablet; Take 1 tablet (300 mg total) by mouth daily

## 2025-05-05 NOTE — ASSESSMENT & PLAN NOTE
Orders:    DULoxetine (Cymbalta) 30 mg delayed release capsule; Take 1 capsule (30 mg total) by mouth daily    DULoxetine (CYMBALTA) 60 mg delayed release capsule; Take 1 capsule (60 mg total) by mouth daily Do not start before May 19, 2025.

## 2025-05-06 ENCOUNTER — OFFICE VISIT (OUTPATIENT)
Dept: PHYSICAL THERAPY | Age: 35
End: 2025-05-06
Payer: COMMERCIAL

## 2025-05-06 ENCOUNTER — TELEPHONE (OUTPATIENT)
Dept: PSYCHIATRY | Facility: CLINIC | Age: 35
End: 2025-05-06

## 2025-05-06 DIAGNOSIS — M79.18 MYOFASCIAL PAIN: Primary | ICD-10-CM

## 2025-05-06 DIAGNOSIS — H92.02 LEFT EAR PAIN: ICD-10-CM

## 2025-05-06 DIAGNOSIS — M79.7 FIBROMYALGIA: ICD-10-CM

## 2025-05-06 PROCEDURE — 97110 THERAPEUTIC EXERCISES: CPT

## 2025-05-06 PROCEDURE — 97140 MANUAL THERAPY 1/> REGIONS: CPT

## 2025-05-06 NOTE — PROGRESS NOTES
"Daily Note     Today's date: 2025  Patient name: Cruz Schneider  : 1990  MRN: [id number]  Referring provider: Tanisha Newman P*  Dx:   Encounter Diagnosis     ICD-10-CM    1. Myofascial pain  M79.18       2. Fibromyalgia  M79.7       3. Left ear pain  H92.02                      Subjective: facial pain at a level 2 today steady progress noted.      Objective: See treatment diary below      Assessment: Tolerated treatment well. Patient demonstrated fatigue post treatment      Plan: Continue per plan of care.      Precautions: allergies  Reviewed by You at 11:40 PM   Severity Reactions Comments   Cimzia [certolizumab Pegol] High Swelling Face and hands   Desvenlafaxine Medium Other (See Comments) Other reaction(s): state of confusion   Ixekizumab Medium Vomiting    Seasonal Ic [octacosanol] Low Nasal Congestion    Tizanidine Low Anxiety Panic attacks         Manuals 3/27/25 3/31 4/25 5/6/25         Intraoral trigger point release prn by PT in future  Ieval   Man 15min  Man 15 min         Graston to left myofascial facial musculature     Man 15 min          Suboccipital release, man neck traction  man RK man An 15min          Trigger point release periscapular region sitting or prone    man Part ot man         Neuro Re-Ed             Rocabado 6 x 6 5 of 6 exer 6 reps each pt has handout Reviewed HEP 6 x 6 6 x 6         Upper trap stretch  15\"x3 ea 15 sec x 3          Levator stretch  15\"x3 ea 15 sec x 3          Scm stretch             Ant scalene stretch in supine   15 sec x 3          Foam roll self thoracic mobilization    5min  x 1 5 min x 1         Sh ext , rows  and trial Y abd theraband in standing    Red 1 set of 10           Ther Ex             Corner pect stretch  15\"x3 20 sec x 5 20 sec x 5         Ube alt fwd, bwd  3'/3'           Scapular squeezes  x10 10 10         Sh backward rolls  x10 10 10                                                             Ther Activity                    "                                                           Modalities             Mh facial musculature prn  no charge  10'

## 2025-05-09 ENCOUNTER — TELEPHONE (OUTPATIENT)
Dept: NEUROLOGY | Facility: CLINIC | Age: 35
End: 2025-05-09

## 2025-05-09 ENCOUNTER — TELEMEDICINE (OUTPATIENT)
Dept: BEHAVIORAL/MENTAL HEALTH CLINIC | Facility: CLINIC | Age: 35
End: 2025-05-09
Payer: COMMERCIAL

## 2025-05-09 ENCOUNTER — APPOINTMENT (OUTPATIENT)
Dept: PHYSICAL THERAPY | Age: 35
End: 2025-05-09
Payer: COMMERCIAL

## 2025-05-09 DIAGNOSIS — F41.1 GENERALIZED ANXIETY DISORDER: Chronic | ICD-10-CM

## 2025-05-09 DIAGNOSIS — F33.1 DEPRESSION, MAJOR, RECURRENT, MODERATE (HCC): Primary | Chronic | ICD-10-CM

## 2025-05-09 PROCEDURE — 90837 PSYTX W PT 60 MINUTES: CPT | Performed by: SOCIAL WORKER

## 2025-05-09 NOTE — TELEPHONE ENCOUNTER
Botox number of units: 200  Botox quantity: 1  Arrived at what location: [facility]  Botox at [facility]: yes  NDC number: 3873-6794-01  Lot number: [id number]  Expiration Date:  10/2027  Appt notes indicate correct medication:  Kristen Perez 5/13/2025    Scanned botox receipt into pt media.

## 2025-05-12 ENCOUNTER — APPOINTMENT (OUTPATIENT)
Dept: PHYSICAL THERAPY | Age: 35
End: 2025-05-12
Payer: COMMERCIAL

## 2025-05-13 ENCOUNTER — PROCEDURE VISIT (OUTPATIENT)
Dept: NEUROLOGY | Facility: CLINIC | Age: 35
End: 2025-05-13
Payer: COMMERCIAL

## 2025-05-13 VITALS — SYSTOLIC BLOOD PRESSURE: 137 MMHG | DIASTOLIC BLOOD PRESSURE: 85 MMHG | HEART RATE: 105 BPM | TEMPERATURE: 97.4 F

## 2025-05-13 DIAGNOSIS — G43.709 CHRONIC MIGRAINE WITHOUT AURA WITHOUT STATUS MIGRAINOSUS, NOT INTRACTABLE: Primary | ICD-10-CM

## 2025-05-13 PROCEDURE — 64615 CHEMODENERV MUSC MIGRAINE: CPT | Performed by: PHYSICIAN ASSISTANT

## 2025-05-13 NOTE — PSYCH
Virtual Regular VisitName: Cruz Schneider      : 1990      MRN: [id number]  Encounter Provider: APARNA Fox  Encounter Date: 2025   Encounter department: [facility] [place]  :  Assessment & Plan  Depression, major, recurrent, moderate (HCC)     Generalized anxiety disorder     Goals addressed in session: Goal 1     DATA: Met with Stephanie for her scheduled individual session. Met with Stephanie for her scheduled individual session. Stephanie reports that she has been feeling somewhat down lately. She states that her psychiatrist is making a medication change. She is switching from Lexapro to Cymbalta. Stephanie talked about the reasons that the doctor is looking to make the change in her medications, and she is hopeful that the change will be a positive for her, for both emotion regulation and to assist with some of her pain management. She states that she will have to taper down from her lexapro slowly before tapering up on the cymbalta. Stephanie discussed her S[employer] disability payments. She states that she received a second disbursement of her retroactive payment. We discussed her plans for the money, as she tends to spend money without being mindful about how she is spending. She states that she has not thought about it yet. We discussed the possibility of her doing something nice, possibly with her mother, to get away and enjoy some time together. She states that she did not think of this option. She states that she has not been away from home for a very long time.     During this session, this clinician used the following therapeutic modalities: Client-centered Therapy, Dialectical Behavior Therapy, Mindfulness-based Strategies, Motivational Interviewing, Solution-Focused Therapy, and Supportive Psychotherapy    Substance Abuse was not addressed during this session. If the client is diagnosed with a co-occurring substance use disorder, please indicate any changes in  "the frequency or amount of use: n/a. Stage of change for addressing substance use diagnoses: No substance use/Not applicable    ASSESSMENT:  Cruz presents with a Euthymic/ normal mood. Jomars affect is Normal range and intensity, which is congruent, with their mood and the content of the session. The client has made progress on their goals as evidenced by her decision to continue to set limits with friends who do not provide positive interactions to her and her attempts to engage with new acquaintances.     Cruz presents with a minimal risk of suicide, minimal risk of self-harm, and minimal risk of harm to others.    For any risk assessment that surpasses a \"low\" rating, a safety plan must be developed.    A safety plan was indicated: no  If yes, describe in detail n/a    PLAN: Between sessions, Cruz will continue to monitor her moods. She will continue to engage in social activities outside of the home and with people other than her immediate family members. At the next session, the therapist will use Client-centered Therapy, Dialectical Behavior Therapy, Mindfulness-based Strategies, Motivational Interviewing, Solution-Focused Therapy, and Supportive Psychotherapy to address her mood regulation and relationships.    Behavioral Health Treatment Plan [facility]: Diagnosis and Treatment Plan explained to Cruz, Cruz relates understanding diagnosis and is agreeable to Treatment Plan. Yes     Depression Follow-up Plan Completed: Yes     Reason for visit is   Chief Complaint   Patient presents with    Virtual Regular Visit        Recent Visits  Date Type Provider Dept   05/09/25 Telemedicine APARNA Fox [facility] [place]   Showing recent visits within past 7 days and meeting all other requirements  Future Appointments  No visits were found meeting these conditions.  Showing future appointments within next 150 days and meeting all other requirements     History of " Present Illness     HPI    Past Medical History   Past Medical History:   Diagnosis Date    Abnormal Pap smear of cervix     Allergic     Anxiety     Arthritis     Dental caries     Depression     Fibromyalgia, primary     GERD (gastroesophageal reflux disease)     IBS (irritable bowel syndrome)     Migraine     Obesity     Vitamin B12 deficiency      Past Surgical History:   Procedure Laterality Date    COLONOSCOPY N/A 5/8/2018    Procedure: COLONOSCOPY;  Surgeon: Stephanie Alston MD;  Location: [facility] GI LAB;  Service: Gastroenterology    RI EXC B9 LESION MRGN XCP SK TG S/N/H/F/G > 4.0CM N/A 7/1/2021    Procedure: EXCISION OF PILAR SCALP CYST X 2 AND RIGHT INNER GROIN NEVVUS;  Surgeon: Denis Barron MD;  Location:  MAIN OR;  Service: Plastics    RI LAPS SURG CHOLECYSTECTOMY W/CHOLANGIOGRAPHY N/A 12/26/2024    Procedure: CHOLECYSTECTOMY LAPAROSCOPIC;  Surgeon: Kulwinder Nunn MD;  Location: [facility];  Service: General    RI REPAIR COMPLEX SCALP/ARM/LEG 2.6-7.5 CM N/A 7/1/2021    Procedure: CLOSURE WOUND SCALP X 2 AND RIGHT INNER GROIN;  Surgeon: Denis Barron MD;  Location:  MAIN OR;  Service: Plastics    TONSILLECTOMY      WISDOM TOOTH EXTRACTION       Current Outpatient Medications   Medication Instructions    acetaminophen (TYLENOL) 500 mg, Every 6 hours PRN    ALPRAZolam (XANAX) 0.5 mg, Oral, 3 times daily PRN    ascorbic acid (VITAMIN C) 500 mg, Daily    Botox 200 units SOLR     buPROPion (WELLBUTRIN XL) 300 mg, Oral, Daily    cyanocobalamin 1,000 mcg/mL 1 IM injection every month    dihydroergotamine (MIGRANAL) 4 MG/ML nasal spray 1 spray, Nasal, As needed, Use in one nostril as directed.  No more than 4 sprays in one hour    DULoxetine (CYMBALTA) 30 mg, Oral, Daily    [START ON 5/19/2025] DULoxetine (CYMBALTA) 60 mg, Oral, Daily    etonogestrel-ethinyl estradiol (NuvaRing) 0.12-0.015 MG/24HR vaginal ring Insert ring for 21 days then remove for one week.    gabapentin (NEURONTIN) 300 mg, Oral, 3  times daily    Galcanezumab-gnlm (Emgality) 120 MG/ML SOAJ Inject as directed 1 ml Subcutaneous  every 30 days    meloxicam (MOBIC) 15 mg, Oral, Daily PRN    methocarbamol (ROBAXIN) 500 mg, Oral, 3 times daily    mupirocin (BACTROBAN) 2 % cream Apply bid to areas needed    nystatin (MYCOSTATIN) cream Topical, 2 times daily    OLANZapine (ZYPREXA) 7.5 mg, Oral, Daily at bedtime, Do not take with reglan.    ondansetron (ZOFRAN) 4 mg, Oral, Every 6 hours    polyethylene glycol (MIRALAX) 17 g, As needed    pramipexole (MIRAPEX) 0.25 mg, Oral, Daily at bedtime    prazosin (MINIPRESS) 2 mg, Oral, Daily at bedtime    RABEprazole (ACIPHEX) 20 mg, Oral, 2 times daily    sodium chloride (OCEAN) 0.65 % nasal spray 1 spray, Nasal, As needed    Trudhesa 0.725 MG/ACT AERS 1 spray in each nostril for total dose of 1.45mg, may repeat 1 dose after 1 hour. Max 2 doses per 24 hours and 3 doses per week.    Vitamin D3 2,000 Units, Oral, Daily    Wegovy 0.25 mg, Subcutaneous, Weekly, Inject 0.25 mg under the skin weekly     Allergies   Allergen Reactions    Cimzia [Certolizumab Pegol] Swelling     Face and hands    Desvenlafaxine Other (See Comments)     Other reaction(s): state of confusion    Ixekizumab Vomiting    Seasonal Ic [Octacosanol] Nasal Congestion    Tizanidine Anxiety     Panic attacks       Objective   There were no vitals taken for this visit.    Video Exam  Physical Exam     Administrative Statements   Encounter provider APARNA Fox    The Patient is located at Home and in the following state in which I hold an active license [place].    The patient was identified by name and date of birth. Cruz Schneider was informed that this is a telemedicine visit and that the visit is being conducted through the Epic Embedded platform. She agrees to proceed..  My office door was closed. No one else was in the room.  She acknowledged consent and understanding of privacy and security of the video platform. The patient has agreed  to participate and understands they can discontinue the visit at any time.    Visit Time  Start Time: 0804  Stop Time: 0902  Total Visit Time: 58 minutes

## 2025-05-13 NOTE — PROGRESS NOTES
Universal Protocol   Consent: Verbal consent obtained. Written consent obtained.  Risks and benefits: risks, benefits and alternatives were discussed  Consent given by: patient  Patient understanding: patient states understanding of the procedure being performed  Patient consent: the patient's understanding of the procedure matches consent given  Procedure consent: procedure consent matches procedure scheduled      Chemodenervation     Date/Time  5/13/2025 8:30 AM     Performed by  Jacki Perez PA-C   Authorized by  Jacki Perez PA-C     Pre-procedure details      Prepped With: Alcohol     Procedure details      Position:  Upright   Botox      Botox Type:  Type A    Brand:  Botox    mL's of Botulinum Toxin:  200    Final Concentration per CC:  100 units    Needle Gauge:  30 G 2.5 inch   Procedures      Botox Procedures: chronic headache      Indications: migraines     Injection Location      Head / Face:  L superior trapezius, R superior trapezius, L superior cervical paraspinal, R superior cervical paraspinal, L , R , procerus, L temporalis, R temporalis, R frontalis, L frontalis, R medial occipitalis and L medial occipitalis    L  injection amount:  5 unit(s)    R  injection amount:  5 unit(s)    L lateral frontalis:  5 unit(s)    R lateral frontalis:  5 unit(s)    L medial frontalis:  5 unit(s)    R medial frontalis:  5 unit(s)    L temporalis injection amount:  20 unit(s)    R temporalis injection amount:  20 unit(s)    Procerus injection amount:  5 unit(s)    L medial occipitalis injection amount:  15 unit(s)    R medial occipitalis injection amount:  15 unit(s)    L superior cervical paraspinal injection amount:  10 unit(s)    R superior cervical paraspinal injection amount:  10 unit(s)    L superior trapezius injection amount:  0 unit(s)    R superior trapezius injection amount:  0 unit(s)    Comments:  No u   Total Units      Total units used:  200    Total units  discarded:  0   Post-procedure details      Chemodenervation:  Chronic migraine    Facial Nerve Location::  Bilateral facial nerve    Patient tolerance of procedure:  Tolerated well, no immediate complications   Comments        Medically necessary:  - 30 units between the R and L headband region  - 35 units between each anterior temporalis and scalp  - 5 each masseter (10 total)  Avoided traps b/l.       Blood pressure 137/85, pulse 105, temperature (!) 97.4 °F (36.3 °C), temperature source Temporal, not currently breastfeeding.    R ear pain, PT made muscle pain worse.  Pt will consider bite guard with dentist,  Hold PT for now  Consider acupuncture  Continue botox for jaw pain.  ENT states ear healthy.

## 2025-05-22 DIAGNOSIS — G25.81 RESTLESS LEG SYNDROME: ICD-10-CM

## 2025-05-22 RX ORDER — PRAMIPEXOLE DIHYDROCHLORIDE 0.25 MG/1
0.25 TABLET ORAL
Qty: 90 TABLET | Refills: 1 | Status: SHIPPED | OUTPATIENT
Start: 2025-05-22

## 2025-05-23 ENCOUNTER — TELEMEDICINE (OUTPATIENT)
Dept: BEHAVIORAL/MENTAL HEALTH CLINIC | Facility: CLINIC | Age: 35
End: 2025-05-23
Payer: COMMERCIAL

## 2025-05-23 DIAGNOSIS — F41.1 GENERALIZED ANXIETY DISORDER: Chronic | ICD-10-CM

## 2025-05-23 DIAGNOSIS — F33.1 DEPRESSION, MAJOR, RECURRENT, MODERATE (HCC): Primary | Chronic | ICD-10-CM

## 2025-05-23 PROCEDURE — 90837 PSYTX W PT 60 MINUTES: CPT | Performed by: SOCIAL WORKER

## 2025-05-23 NOTE — PSYCH
"Virtual Regular VisitName: Cruz Schneider      : 1990      MRN: [id number]  Encounter Provider: APARNA Fox  Encounter Date: 2025   Encounter department: [facility] [place]  :  Assessment & Plan  Depression, major, recurrent, moderate (HCC)       Generalized anxiety disorder       Depression, major, recurrent, moderate (HCC)     Generalized anxiety disorder     Goals addressed in session: Goal 1     DATA: Met with Stephanie for her scheduled individual session. \"I've been in a flare. I think it's because of the weather.\" Stephanie states that she has tapered down from her Lexapro and has increased her Cymbalta. She states that she does not feel that she is in such a \"dark place\" as she was previously. She reports being hopeful that the new medication regimen will be helpful. Stephanie talked about the events of the past couple weeks. She states that she went to a [employer] and had a good conversation with someone who does crystal healing. She states that she feels that she has a good connection with this person. Stephanie discussed her goals for her future. She states that she wants to help people in her life. We discussed the possibility of her getting involved with [employer], to see if there are some things that she could do with them, to increase her motivation to move toward her long-term goals.     During this session, this clinician used the following therapeutic modalities: Client-centered Therapy, Dialectical Behavior Therapy, Mindfulness-based Strategies, Motivational Interviewing, Solution-Focused Therapy, and Supportive Psychotherapy    Substance Abuse was not addressed during this session. If the client is diagnosed with a co-occurring substance use disorder, please indicate any changes in the frequency or amount of use: n/a. Stage of change for addressing substance use diagnoses: No substance use/Not applicable    ASSESSMENT:  Cruz presents with a " "Euthymic/ normal mood. Cruz's affect is Normal range and intensity, which is congruent, with their mood and the content of the session. The client has made progress on their goals as evidenced by her ability to identify positive movement toward recovery. She is able to identify new goals to move her closer to independence and finding meaningful work.    Cruz presents with a minimal risk of suicide, minimal risk of self-harm, and minimal risk of harm to others.    For any risk assessment that surpasses a \"low\" rating, a safety plan must be developed.    A safety plan was indicated: no  If yes, describe in detail n/a    PLAN: Between sessions, Cruz will continue to monitor her moods. She will continue to work on identifying activities that bring her ciara and give her a sense of purpose. She will continue to work on developing and expanding her social support network. At the next session, the therapist will use Client-centered Therapy, Dialectical Behavior Therapy, Mindfulness-based Strategies, Motivational Interviewing, Solution-Focused Therapy, and Supportive Psychotherapy to address her mood regulation and relationship concerns.    Behavioral Health Treatment Plan [facility]: Diagnosis and Treatment Plan explained to Cruz, Cruz relates understanding diagnosis and is agreeable to Treatment Plan. Yes     Depression Follow-up Plan Completed: Yes     Reason for visit is   Chief Complaint   Patient presents with    Virtual Regular Visit      Recent Visits  No visits were found meeting these conditions.  Showing recent visits within past 7 days and meeting all other requirements  Future Appointments  No visits were found meeting these conditions.  Showing future appointments within next 150 days and meeting all other requirements     History of Present Illness     HPI    Past Medical History   Past Medical History:   Diagnosis Date    Abnormal Pap smear of cervix     Allergic     Anxiety     Arthritis "     Dental caries     Depression     Fibromyalgia, primary     GERD (gastroesophageal reflux disease)     IBS (irritable bowel syndrome)     Migraine     Obesity     Vitamin B12 deficiency      Past Surgical History:   Procedure Laterality Date    COLONOSCOPY N/A 5/8/2018    Procedure: COLONOSCOPY;  Surgeon: Stephanie Alston MD;  Location: [facility] GI LAB;  Service: Gastroenterology    KS EXC B9 LESION MRGN XCP SK TG S/N/H/F/G > 4.0CM N/A 7/1/2021    Procedure: EXCISION OF PILAR SCALP CYST X 2 AND RIGHT INNER GROIN NEVVUS;  Surgeon: Denis Barron MD;  Location:  MAIN OR;  Service: Plastics    KS LAPS SURG CHOLECYSTECTOMY W/CHOLANGIOGRAPHY N/A 12/26/2024    Procedure: CHOLECYSTECTOMY LAPAROSCOPIC;  Surgeon: Kulwinder Nunn MD;  Location: [facility];  Service: General    KS REPAIR COMPLEX SCALP/ARM/LEG 2.6-7.5 CM N/A 7/1/2021    Procedure: CLOSURE WOUND SCALP X 2 AND RIGHT INNER GROIN;  Surgeon: Denis Barron MD;  Location:  MAIN OR;  Service: Plastics    TONSILLECTOMY      WISDOM TOOTH EXTRACTION       Current Outpatient Medications   Medication Instructions    acetaminophen (TYLENOL) 500 mg, Every 6 hours PRN    ALPRAZolam (XANAX) 0.5 mg, Oral, 3 times daily PRN    ascorbic acid (VITAMIN C) 500 mg, Daily    Botox 200 units SOLR     buPROPion (WELLBUTRIN XL) 300 mg, Oral, Daily    cyanocobalamin 1,000 mcg/mL 1 IM injection every month    dihydroergotamine (MIGRANAL) 4 MG/ML nasal spray 1 spray, Nasal, As needed, Use in one nostril as directed.  No more than 4 sprays in one hour    DULoxetine (CYMBALTA) 40 mg, Oral, Daily    etonogestrel-ethinyl estradiol (NuvaRing) 0.12-0.015 MG/24HR vaginal ring Insert ring for 21 days then remove for one week.    gabapentin (NEURONTIN) 300 mg, Oral, 3 times daily    Galcanezumab-gnlm (Emgality) 120 MG/ML SOAJ Inject as directed 1 ml Subcutaneous  every 30 days    meloxicam (MOBIC) 15 mg, Oral, Daily PRN    methocarbamol (ROBAXIN) 500 mg, Oral, 3 times daily    mupirocin  (BACTROBAN) 2 % cream Apply bid to areas needed    nystatin (MYCOSTATIN) cream Topical, 2 times daily    OLANZapine (ZYPREXA) 7.5 mg, Oral, Daily at bedtime, Do not take with reglan.    ondansetron (ZOFRAN) 4 mg, Oral, Every 6 hours    polyethylene glycol (MIRALAX) 17 g, As needed    pramipexole (MIRAPEX) 0.25 mg, Oral, Daily at bedtime    prazosin (MINIPRESS) 2 mg, Oral, Daily at bedtime    RABEprazole (ACIPHEX) 20 mg, Oral, 2 times daily    sodium chloride (OCEAN) 0.65 % nasal spray 1 spray, Nasal, As needed    Trudhesa 0.725 MG/ACT AERS 1 spray in each nostril for total dose of 1.45mg, may repeat 1 dose after 1 hour. Max 2 doses per 24 hours and 3 doses per week.    Vitamin D3 2,000 Units, Oral, Daily    Wegovy 0.25 mg, Subcutaneous, Weekly, Inject 0.25 mg under the skin weekly     Allergies   Allergen Reactions    Cimzia [Certolizumab Pegol] Swelling     Face and hands    Desvenlafaxine Other (See Comments)     Other reaction(s): state of confusion    Ixekizumab Vomiting    Seasonal Ic [Octacosanol] Nasal Congestion    Tizanidine Anxiety     Panic attacks       Objective   There were no vitals taken for this visit.    Video Exam  Physical Exam     Administrative Statements   Encounter provider APARNA Fox    The Patient is located at Home and in the following state in which I hold an active license [place].    The patient was identified by name and date of birth. Cruz Schneider was informed that this is a telemedicine visit and that the visit is being conducted through the Epic Embedded platform. She agrees to proceed..  My office door was closed. No one else was in the room.  She acknowledged consent and understanding of privacy and security of the video platform. The patient has agreed to participate and understands they can discontinue the visit at any time.    Visit Time  Start Time: 0802  Stop Time: 0859  Total Visit Time: 57 minutes

## 2025-05-29 ENCOUNTER — TELEPHONE (OUTPATIENT)
Age: 35
End: 2025-05-29

## 2025-05-29 DIAGNOSIS — M79.7 FIBROMYALGIA: ICD-10-CM

## 2025-05-29 DIAGNOSIS — F33.1 DEPRESSION, MAJOR, RECURRENT, MODERATE (HCC): Primary | Chronic | ICD-10-CM

## 2025-05-29 RX ORDER — DULOXETINE 40 MG/1
40 CAPSULE, DELAYED RELEASE ORAL DAILY
Qty: 30 CAPSULE | Refills: 2 | Status: SHIPPED | OUTPATIENT
Start: 2025-05-29 | End: 2025-05-30

## 2025-05-29 NOTE — TELEPHONE ENCOUNTER
Spoke to Stephanie (ok to leave detailed VM) - she said she has diarrhea and then constipation, nausea, which are all manageable. The side effect that is unmanageable is a fainting feeling. She feels very weak where she doesn't even want to move her arms. She felt this a little on 30 mg dose and it amplified on 60 mg. She has been taking 60 mg for about two weeks now. Forwarding to provider for review. Clinical will follow up as advised.

## 2025-05-29 NOTE — TELEPHONE ENCOUNTER
Spoke to Stephanie - she would like to try the decreased dose of 40 mg.     [facility] - [place] - [address] [place] [address] Street

## 2025-05-29 NOTE — TELEPHONE ENCOUNTER
Patient called in and stated she was having side effects from the medication Dr Nunn put her on. Writer was able to warm transfer the patient to the nurses line for assistance.

## 2025-05-30 ENCOUNTER — TELEPHONE (OUTPATIENT)
Age: 35
End: 2025-05-30

## 2025-05-30 DIAGNOSIS — F33.1 DEPRESSION, MAJOR, RECURRENT, MODERATE (HCC): Primary | Chronic | ICD-10-CM

## 2025-05-30 RX ORDER — DULOXETIN HYDROCHLORIDE 20 MG/1
40 CAPSULE, DELAYED RELEASE ORAL DAILY
Qty: 60 CAPSULE | Refills: 2 | Status: SHIPPED | OUTPATIENT
Start: 2025-05-30

## 2025-05-30 NOTE — TELEPHONE ENCOUNTER
[facility] called in and shared insurance wont cover medication Duloxetine 40mg.     Pharmacist shared insurance will only cover 20mg. 30mg and 60mg.

## 2025-06-01 NOTE — PATIENT INSTRUCTIONS
Graham Kim is a 20 month old male who was brought in for this visit.  History was provided by the CAREGIVER  Here for longitudinal primary care  HPI:     Chief Complaint   Patient presents with    Rash     Since about 5/15 on cheeks    Cough     And congestion since         HPI    History of Present Illness  Graham Kim is a 20 month old male who presents with a persistent facial rash following vaccination. He is accompanied by his father.    One week post-vaccination, a rash developed initially covering the entire body, now persisting on the face, especially the cheeks. The rash is dry, rough, and scaly, more pronounced on the left cheek. It does not cause discomfort or itching. A steroid cream provided temporary relief, but the rash reappeared the next day. The cream was used for a week. (Aclovate)    He has experienced febrile seizures, with a recent episode lasting two minutes despite antipyretic use. He recovered quickly and resumed normal activities. His father monitors his temperature and administers medication diligently.    He often breathes through his mouth, particularly during activities, and snores at night. A vaporizer and Vicks are used for congestion. Concerns about tonsils or adenoids are noted, but he can breathe through his nose with a pacifier. Frequent pacifier use may contribute to facial rash due to saliva.       Patient Active Problem List   Diagnosis    Born by breech delivery    Heart murmur of     Infantile eczema    Febrile seizure (HCC)    Vaccination delay     Past Medical History  No past medical history on file.      Current Medications  No current outpatient medications on file prior to visit.     No current facility-administered medications on file prior to visit.       Allergies  No Known Allergies    Review of Systems:    Review of Systems      Drinking well  Eating well      PHYSICAL EXAM:     Wt Readings from Last 1 Encounters:   25 12.2 kg (26 lb  Scheduled date of EGD(as of today): 6/22/2022  Physician performing EGD: Dr Collins  Location of EGD: [facility]  Instructions reviewed with patient by: Susan Camacho  Clearances: N/A 13 oz) (70%, Z= 0.52)*     * Growth percentiles are based on WHO (Boys, 0-2 years) data.     Temp 98.9 °F (37.2 °C) (Tympanic)   Wt 12.2 kg (26 lb 13 oz)     Constitutional: appears well hydrated, alert and responsive, no acute distress noted    Head: normocephalic  Eye: no conjunctival injection  Ear:normal shape and position  ear canal and TM normal bilaterally   Nose: nares normal, no discharge  Mouth/Throat: Mouth: normal tongue, oral mucosa and gingiva  Throat: tonsils and uvula normal  Neck: supple, no lymphadenopathy  Respiratory: clear to auscultation bilaterally  Cardiovascular: regular rate and rhythm, no murmur  Abdominal: non distended, normal bowel sounds, no tenderness, no organomegaly, no masses  Extremites: no deformities  Skin erythematous papules on cheeks b/l; right side has a scaley feel to it; pacifier covers the involved areas  Psychologic: behavior appropriate for age        Assessment & Plan:   There are no diagnoses linked to this encounter.    Assessment & Plan  Assessment & Plan  Eczema  Eczema on left cheek, exacerbated by pacifier use. Improved with steroids but recurred. No significant discomfort.  - Prescribed 2.5% hydrocortisone ointment. Apply twice daily until rash subsides. Hopefully no longer than 1 week  - Encouraged consistent moisturizer use.  - Advised discontinuation of pacifier use.    Febrile Seizures  Recurrent febrile seizures, brief with rapid recovery. No long-term neurological impact expected. Seizures can occur despite fever management.  - Administer ibuprofen and acetaminophen as needed for fever.  - No additional vaccinations until age five, except optional hepatitis A.        advised to go to ER if worse no need to return if treatment plan corrects reason for visit rest antipyretics/analgesics as needed for pain or fever   push/encourage fluids diet as tolerated   Instructions given to parents verbally and in writing for this condition,  F/U if symptoms worsen or  do not improve or parental concerns increase.  The parent indicates understanding of these instructions and agrees to the plan.   Follow up PRN       MDM:  Problem:  Data:  Risk:    6/1/2025  Luciana Mathis MD

## 2025-06-04 ENCOUNTER — OFFICE VISIT (OUTPATIENT)
Dept: DENTISTRY | Facility: CLINIC | Age: 35
End: 2025-06-04

## 2025-06-04 ENCOUNTER — TELEMEDICINE (OUTPATIENT)
Dept: PSYCHIATRY | Facility: CLINIC | Age: 35
End: 2025-06-04
Payer: COMMERCIAL

## 2025-06-04 ENCOUNTER — TELEPHONE (OUTPATIENT)
Dept: PSYCHIATRY | Facility: CLINIC | Age: 35
End: 2025-06-04

## 2025-06-04 VITALS — TEMPERATURE: 99.1 F | DIASTOLIC BLOOD PRESSURE: 87 MMHG | HEART RATE: 116 BPM | SYSTOLIC BLOOD PRESSURE: 132 MMHG

## 2025-06-04 DIAGNOSIS — F41.0 PANIC DISORDER WITHOUT AGORAPHOBIA: ICD-10-CM

## 2025-06-04 DIAGNOSIS — F33.1 DEPRESSION, MAJOR, RECURRENT, MODERATE (HCC): Primary | Chronic | ICD-10-CM

## 2025-06-04 DIAGNOSIS — K02.9 DENTAL CARIES: Primary | ICD-10-CM

## 2025-06-04 DIAGNOSIS — F41.1 GENERALIZED ANXIETY DISORDER: Chronic | ICD-10-CM

## 2025-06-04 PROCEDURE — D2392 RESIN-BASED COMPOSITE - 2 SURFACES, POSTERIOR: HCPCS

## 2025-06-04 PROCEDURE — 99214 OFFICE O/P EST MOD 30 MIN: CPT | Performed by: PSYCHIATRY & NEUROLOGY

## 2025-06-04 NOTE — PSYCH
Virtual Regular Visit    Verification of patient location:    Patient is located at Home in the following state in which I hold an active license [place]      Assessment/Plan:    Problem List Items Addressed This Visit    None                        Reason for visit is   Medication Management     Encounter provider Aliyah Keating MD    Provider located at [facility]  [address]  [place] [address] [phone]      Recent Visits  No visits were found meeting these conditions.  Showing recent visits within past 7 days and meeting all other requirements  Future Appointments  No visits were found meeting these conditions.  Showing future appointments within next 150 days and meeting all other requirements       The patient was identified by name and date of birth. Cruz Schneider was informed that this is a telemedicine visit and that the visit is being conducted throughthe Epic Embedded platform. She agrees to proceed..  My office door was closed. No one else was in the room.  She acknowledged consent and understanding of privacy and security of the video platform. The patient has agreed to participate and understands they can discontinue the visit at any time.    Patient is aware this is a billable service.     Subjective  Cruz Schneider is a 34 y.o. female with MDD,Panic do, BESSY .Stephanie remains compliant with her medications and denies side effects. No recent health changes or new medications.  She continues to meet with her counselor on a regular basis.      Patient stated she has been dealing with a lot of physical pain due to ankylosing spondylitis and plantar fasciitis.   She is also taking medical cannabis which helps with her anxiety and also with her chronic pain.  Recently  her anxiety has been high and had Alprazolam increased to 0.5 mg po tid temporarily.   She has benefited from dose increase but still struggles with  depressed mood.    She had  dose increase on Olanzapine to 7.5 mg po qhs. Lipids and A1C ordered and still pending.  She feels better after dose increase.  She  had trial of Phentermine for weight loss but experienced side effcts. She started trial of Wegovy instead.    She has been depressed and feeling hopeless about her physical symptoms . The rheumatologist had nothing to offer to help her symptoms.  Offered trial of Duloxetine and she agreed. She tapered  off Lexapro by reducing dose to 10 mg for 2 weeks while cross tapering and starting Duloxetine 30 mg po qam for 2 weeks , then stopped Lexapro and increased Duloxetine to 60 mg daily. She reported nausea, diarrhea constipation and dizziness after dose increase.    We decided to lower dose to 40 mg daily and that helped with side effects.  She is also taking Wegovy that causes similar side effetcs.  She is willing to give medication more time and agree to stay on current dose and f/u in 8 weeks.          Schedule follow up in 8 weeks.             Allergies   Allergen Reactions    Cimzia [Certolizumab Pegol] Swelling     Face and hands    Desvenlafaxine Other (See Comments)     Other reaction(s): state of confusion    Ixekizumab Vomiting    Seasonal Ic [Octacosanol] Nasal Congestion    Tizanidine Anxiety     Panic attacks       Review of Systems    Mood Anxiety and Depression   Behavior Normal    Thought Content Disturbing Thoughts, Feelings   General Emotional Problems and Decreased Functioning   Personality Normal   Other Psych Symptoms Normal   Constitutional Negative   ENT Negative   Cardiovascular Negative   Respiratory Negative   Gastrointestinal Negative   Genitourinary Negative   Musculoskeletal Negative   Integumentary Negative   Neurological Negative   Endocrine Normal    Other Symptoms Normal        Laboratory Results:   Recent Labs (last 12 months):   Office Visit on 05/02/2025   Component Date Value    Urine Culture 05/02/2025 No Growth <1000 cfu/mL      Color, UA 05/02/2025 Yellow     Clarity, UA 05/02/2025 Turbid     Specific Gravity, UA 05/02/2025 1.018     pH, UA 05/02/2025 6.5     Leukocytes, UA 05/02/2025 Large (A)     Nitrite, UA 05/02/2025 Negative     Protein, UA 05/02/2025 Trace (A)     Glucose, UA 05/02/2025 Negative     Ketones, UA 05/02/2025 Negative     Urobilinogen, UA 05/02/2025 <2.0     Bilirubin, UA 05/02/2025 Negative     Occult Blood, UA 05/02/2025 Negative     RBC, UA 05/02/2025 4-10 (A)     WBC, UA 05/02/2025 Innumerable (A)     Epithelial Cells 05/02/2025 Moderate (A)     Bacteria, UA 05/02/2025 Occasional    Admission on 05/01/2025, Discharged on 05/01/2025   Component Date Value    WBC 05/01/2025 13.53 (H)     RBC 05/01/2025 4.47     Hemoglobin 05/01/2025 12.8     Hematocrit 05/01/2025 38.5     MCV 05/01/2025 86     MCH 05/01/2025 28.6     MCHC 05/01/2025 33.2     RDW 05/01/2025 13.9     MPV 05/01/2025 8.5 (L)     Platelets 05/01/2025 532 (H)     nRBC 05/01/2025 0     Segmented % 05/01/2025 60     Immature Grans % 05/01/2025 0     Lymphocytes % 05/01/2025 32     Monocytes % 05/01/2025 7     Eosinophils Relative 05/01/2025 1     Basophils Relative 05/01/2025 0     Absolute Neutrophils 05/01/2025 8.04 (H)     Absolute Immature Grans 05/01/2025 0.04     Absolute Lymphocytes 05/01/2025 4.29     Absolute Monocytes 05/01/2025 0.93     Eosinophils Absolute 05/01/2025 0.18     Basophils Absolute 05/01/2025 0.05     Sodium 05/01/2025 136     Potassium 05/01/2025 4.1     Chloride 05/01/2025 104     CO2 05/01/2025 23     ANION GAP 05/01/2025 9     BUN 05/01/2025 8     Creatinine 05/01/2025 0.72     Glucose 05/01/2025 95     Calcium 05/01/2025 9.1     AST 05/01/2025 15     ALT 05/01/2025 14     Alkaline Phosphatase 05/01/2025 63     Total Protein 05/01/2025 7.5     Albumin 05/01/2025 3.9     Total Bilirubin 05/01/2025 0.65     eGFR 05/01/2025 110     Lipase 05/01/2025 23     EXT Preg Test, Ur 05/01/2025 Negative     Control 05/01/2025 Valid     Appointment on 04/22/2025   Component Date Value    WBC 04/22/2025 9.39     RBC 04/22/2025 4.27     Hemoglobin 04/22/2025 12.0     Hematocrit 04/22/2025 37.2     MCV 04/22/2025 87     MCH 04/22/2025 28.1     MCHC 04/22/2025 32.3     RDW 04/22/2025 14.3     MPV 04/22/2025 9.1     Platelets 04/22/2025 492 (H)     nRBC 04/22/2025 0     Segmented % 04/22/2025 47     Immature Grans % 04/22/2025 0     Lymphocytes % 04/22/2025 42     Monocytes % 04/22/2025 7     Eosinophils Relative 04/22/2025 3     Basophils Relative 04/22/2025 1     Absolute Neutrophils 04/22/2025 4.46     Absolute Immature Grans 04/22/2025 0.03     Absolute Lymphocytes 04/22/2025 3.97     Absolute Monocytes 04/22/2025 0.62     Eosinophils Absolute 04/22/2025 0.26     Basophils Absolute 04/22/2025 0.05     Sodium 04/22/2025 139     Potassium 04/22/2025 3.5     Chloride 04/22/2025 105     CO2 04/22/2025 23     ANION GAP 04/22/2025 11     BUN 04/22/2025 6     Creatinine 04/22/2025 0.64     Glucose, Fasting 04/22/2025 85     Calcium 04/22/2025 9.0     AST 04/22/2025 10 (L)     ALT 04/22/2025 8     Alkaline Phosphatase 04/22/2025 73     Total Protein 04/22/2025 6.7     Albumin 04/22/2025 3.6     Total Bilirubin 04/22/2025 0.47     eGFR 04/22/2025 117     TSH 3RD GENERATON 04/22/2025 2.168     Cholesterol 04/22/2025 207 (H)     Triglycerides 04/22/2025 241 (H)     HDL, Direct 04/22/2025 57     LDL Calculated 04/22/2025 102 (H)     Non-HDL-Chol (CHOL-HDL) 04/22/2025 150     Vit D, 25-Hydroxy 04/22/2025 22.1 (L)     Vitamin B-12 04/22/2025 150 (L)     Magnesium 04/22/2025 2.0     Cortisol - AM 04/22/2025 16.7     Iron Saturation 04/22/2025 12 (L)     TIBC 04/22/2025 485.8 (H)     Iron 04/22/2025 57     Transferrin 04/22/2025 347     UIBC 04/22/2025 429 (H)     CRP 04/22/2025 14.5 (H)    Appointment on 02/21/2025   Component Date Value    Hemoglobin A1C 02/21/2025 5.8 (H)     EAG 02/21/2025 120     Sodium 02/21/2025 138     Potassium 02/21/2025 4.0      Chloride 02/21/2025 104     CO2 02/21/2025 21     ANION GAP 02/21/2025 13     BUN 02/21/2025 9     Creatinine 02/21/2025 0.67     Glucose, Fasting 02/21/2025 96     Calcium 02/21/2025 9.0     AST 02/21/2025 13     ALT 02/21/2025 9     Alkaline Phosphatase 02/21/2025 75     Total Protein 02/21/2025 6.9     Albumin 02/21/2025 3.5     Total Bilirubin 02/21/2025 0.47     eGFR 02/21/2025 115     Cholesterol 02/21/2025 210 (H)     Triglycerides 02/21/2025 182 (H)     HDL, Direct 02/21/2025 52     LDL Calculated 02/21/2025 122 (H)    Procedure visit on 01/07/2025   Component Date Value    Case Report 01/07/2025                      Value:Surgical Pathology Report                         Case: [id number]                                  Authorizing Provider:  Oni Beckham DO           Collected:           01/07/2025 1044              Ordering Location:     [facility]      Received:            01/07/2025 1044                                     Primary Care                                                                 Pathologist:           Ulices Holcomb MD                                                      Specimens:   A) - Eye, Left, next to eye lateral                                                                 B) - Eye, Left, next to eye - medial                                                       Final Diagnosis 01/07/2025                      Value:A. Skin, next to left eye lateral:  VERRUCA VULGARIS       B. Skin, next to left eye medial:   VERRUCA VULGARIS          Note 01/07/2025                      Value:Interpretation performed at [facility] [address] [place] [address] Way, [place] [address]        Additional Information 01/07/2025                      Value:All reported additional testing was performed with appropriately reactive controls.  These tests were developed and their performance characteristics determined by [facility] or appropriate performing facility,  "though some tests may be performed on tissues which have not been validated for performance characteristics (such as staining performed on alcohol exposed cell blocks and decalcified tissues).  Results should be interpreted with caution and in the context of the patients’ clinical condition. These tests may not be cleared or approved by the U.S. Food and Drug Administration, though the FDA has determined that such clearance or approval is not necessary. These tests are used for clinical purposes and they should not be regarded as investigational or for research. This laboratory has been approved by [place] 88, designated as a high-complexity laboratory and is qualified to perform these tests.  .      Gross Description 01/07/2025                      Value:A. The specimen is received in formalin, labeled with the patient's name and hospital number, and is designated \" eye left, next to eye lateral\".  The specimen consists of a tan portion of skin measuring 0.1 x 0.1 by less than 0.1 cm.  1 surface is inked red and the opposite surface is inked green.  Entirely submitted. One cassette.  Between sponges.  B. The specimen is received in formalin, labeled with the patient's name and hospital number, and is designated \" eye, left, next to eye-medial\".  The specimen consists of a tan-white portion of skin measuring 0.2 x 0.1 by less than 0.1 cm.  1 surface is inked red which upon manipulation is split into 2 pieces and entirely submitted between sponges in 1 cassette.    Note: The estimated total formalin fixation time based upon information provided by the submitting clinician and the standard processing schedule is under 72 hours. Raysa        Case Report 01/07/2025                      Value:Surgical Pathology Report                         Case: [id number]                                  Authorizing Provider:  Oni Beckham DO           Collected:           01/07/2025 1044              Ordering Location:     [facility] " [facility]      Received:            01/07/2025 1044                                     Primary Care                                                                 Pathologist:           Ulices Holcomb MD                                                      Specimens:   A) - Eye, Left, next to eye lateral                                                                 B) - Eye, Left, next to eye - medial                                                       Final Diagnosis 01/07/2025                      Value:A. Skin, next to left eye lateral:  VERRUCA VULGARIS       B. Skin, next to left eye medial:   VERRUCA VULGARIS          Note 01/07/2025                      Value:Interpretation performed at [facility] [address] [place] [address] [place] [address]        Additional Information 01/07/2025                      Value:All reported additional testing was performed with appropriately reactive controls.  These tests were developed and their performance characteristics determined by [facility] or appropriate performing facility, though some tests may be performed on tissues which have not been validated for performance characteristics (such as staining performed on alcohol exposed cell blocks and decalcified tissues).  Results should be interpreted with caution and in the context of the patients’ clinical condition. These tests may not be cleared or approved by the U.S. Food and Drug Administration, though the FDA has determined that such clearance or approval is not necessary. These tests are used for clinical purposes and they should not be regarded as investigational or for research. This laboratory has been approved by CLIA 88, designated as a high-complexity laboratory and is qualified to perform these tests.  .      Gross Description 01/07/2025                      Value:A. The specimen is received in formalin, labeled with the patient's name and hospital number, and is  "designated \" eye left, next to eye lateral\".  The specimen consists of a tan portion of skin measuring 0.1 x 0.1 by less than 0.1 cm.  1 surface is inked red and the opposite surface is inked green.  Entirely submitted. One cassette.  Between sponges.  B. The specimen is received in formalin, labeled with the patient's name and hospital number, and is designated \" eye, left, next to eye-medial\".  The specimen consists of a tan-white portion of skin measuring 0.2 x 0.1 by less than 0.1 cm.  1 surface is inked red which upon manipulation is split into 2 pieces and entirely submitted between sponges in 1 cassette.    Note: The estimated total formalin fixation time based upon information provided by the submitting clinician and the standard processing schedule is under 72 hours. AKemmerer       Admission on 12/26/2024, Discharged on 12/26/2024   Component Date Value    EXT Preg Test, Ur 12/26/2024 Negative     Control 12/26/2024 Valid     Case Report 12/26/2024                      Value:Surgical Pathology Report                         Case: [id number]                                  Authorizing Provider:  Kulwinder Nunn MD       Collected:           12/26/2024 0814              Ordering Location:     [facility] Received:            12/26/2024 1009                                     Operating Room                                                               Pathologist:           Marcelo Elise MD                                                                 Specimen:    Gallbladder                                                                                Final Diagnosis 12/26/2024                      Value:A. Gallbladder, cholecystectomy:  - Chronic cholecystitis, cholesterolosis.  - One benign lymph node (0/1).        Note 12/26/2024                      Value:Interpretation performed at [facility], [facility] [address]        Additional Information 12/26/2024               " "       Value:All reported additional testing was performed with appropriately reactive controls.  These tests were developed and their performance characteristics determined by [facility] or appropriate performing facility, though some tests may be performed on tissues which have not been validated for performance characteristics (such as staining performed on alcohol exposed cell blocks and decalcified tissues).  Results should be interpreted with caution and in the context of the patients’ clinical condition. These tests may not be cleared or approved by the U.S. Food and Drug Administration, though the FDA has determined that such clearance or approval is not necessary. These tests are used for clinical purposes and they should not be regarded as investigational or for research. This laboratory has been approved by [place] 88, designated as a high-complexity laboratory and is qualified to perform these tests.  .      Gross Description 12/26/2024                      Value:A. The specimen is received in formalin, labeled with the patient's name and hospital number, and is designated \" gallbladder.\"  The specimen consists of a grossly intact purple to tan gallbladder that is 5.9 x 2.6 x 2.6 cm.  The surface has areas of attached yellow fat in brown to tan fibrous tissue at the area previous resection.  The staple cystic duct margin is inked blue, and submitted with sections.  The gallbladder is opened revealing dark green viscous bile with no stones.  The wall thickness is up to 0.2 cm.  Mucosa is dark green and velvety with gold flecking.  There is a single tan rubbery density near the cystic duct margin measuring up to 0.8 cm.  Representative sections including the density are submitted in 1 cassette.  Note: The estimated total formalin fixation time based upon information provided by the submitting clinician and the standard processing schedule is under 72 hours.  TStevens     Annual Exam on " 11/19/2024   Component Date Value    Case Report 11/19/2024                      Value:Gynecologic Cytology Report                       Case: [id number]                                  Authorizing Provider:  Ignacia López MD  Collected:           11/19/2024 1451              Ordering Location:     [facility]  Received:            11/19/2024 1451                                     [facility]                                                           First Screen:          Laureen Hernandez, CT                                                    Specimen:    LIQUID-BASED PAP, SCREENING, Cervix, Endocervical                                          Primary Interpretation 11/19/2024 Negative for intraepithelial lesion or malignancy     Specimen Adequacy 11/19/2024 Satisfactory for evaluation. Endocervical/transformation zone component present.     Note 11/19/2024                      Value:Screening performed at [facility], [address] [facility] [address].        Additional Information 11/19/2024                      Value:[employer] FDA approved ,  and ThinPrep Imaging Duo System are utilized with strict adherence to the 's instruction manual to prepare gynecologic and non-gynecologic cytology specimens for the production of ThinPrep slides as well as for gynecologic ThinPrep imaging. These processes have been validated by our laboratory and/or by the .  The Pap test is not a diagnostic procedure and should not be used as the sole means to detect cervical cancer. It is only a screening procedure to aid in the detection of cervical cancer and its precursors. Both false-negative and false-positive results have been experienced. Your patient's test result should be interpreted in this context together with the history and clinical findings.      Gross Description 11/19/2024                      Value:A. 20 ml , colorless, cloudy  "received in a ThinPrep vial.      HPV Other HR 11/19/2024 Negative     HPV16 11/19/2024 Negative     HPV18 11/19/2024 Negative    Hospital Outpatient Visit on 11/01/2024   Component Date Value    EXT Preg Test, Ur 11/01/2024 Negative     Control 11/01/2024 Valid     Case Report 11/01/2024                      Value:Surgical Pathology Report                         Case: [id number]                                  Authorizing Provider:  Benitez Whitney DO              Collected:           11/01/2024 1046              Ordering Location:     [facility] Received:            11/01/2024 1130                                     Endoscopy                                                                    Pathologist:           Filemon Johnson MD                                                           Specimens:   A) - Terminal Ileum, bx r/o ibd                                                                     B) - Colon, random bx r/o ibd                                                              Final Diagnosis 11/01/2024                      Value:A. Small intestine, \"Terminal ileum rule out IBD,\" Biopsy:  - Fragments of small intestinal mucosa with intact villous architecture and no increase in intraepithelial lymphocytes  - Negative for chronic or active ileitis, dysplasia or malignancy    B. Colon, \"Random colon biopsy rule out IBD,\" Biopsy:  - Benign colonic mucosa with intact glandular architecture  - Negative for lymphocytic, collagenous, or active colitis  - Negative for acute colitis  - Negative for granulomas, dysplasia, or carcinoma        Additional Information 11/01/2024                      Value:All reported additional testing was performed with appropriately reactive controls.  These tests were developed and their performance characteristics determined by [facility] or appropriate performing facility, though some tests may be performed on tissues which have not been " "validated for performance characteristics (such as staining performed on alcohol exposed cell blocks and decalcified tissues).  Results should be interpreted with caution and in the context of the patients’ clinical condition. These tests may not be cleared or approved by the U.S. Food and Drug Administration, though the FDA has determined that such clearance or approval is not necessary. These tests are used for clinical purposes and they should not be regarded as investigational or for research. This laboratory has been approved by CLIA 88, designated as a high-complexity laboratory and is qualified to perform these tests.  .Interpretation performed at [facility] [address]        Gross Description 11/01/2024                      Value:A. The specimen is received in formalin, labeled with the patient's name and hospital number, and is designated \" terminal ileum rule out IBD\".  The specimen consists of 3 pale-tan soft tissue fragments measuring 0.3, 0.4 and 0.5 cm.  Entirely submitted. Screened cassette.  B. The specimen is received in formalin, labeled with the patient's name and hospital number, and is designated \" random colon biopsy rule out IBD\".  The specimen consists of multiple tan soft tissue fragments measuring in aggregate 0.6 x 0.6 x 0.1 cm.  Entirely submitted. Screened cassette.    Note: The estimated total formalin fixation time based upon information provided by the submitting clinician and the standard processing schedule is over 72 hours.    [facility]      Clinical Information 11/01/2024                      Value:FINDINGS:  · 2 cm sliding hiatal hernia (type I hiatal hernia) without Flako lesions present - GE junction 35 cm from the incisors, diaphragmatic impression 37 cm from the incisors, confirmed by retroflexion:  Hill classification: Grade I  · The upper third of the esophagus, middle third of the esophagus and lower third of the " esophagus appeared normal. Z-line is 35 cm from the incisors.  · The fundus of the stomach, body of the stomach, greater curve of the stomach, lesser curve of the stomach, incisura, antrum, prepyloric region and pylorus appeared normal. Previous biopsies negative.  Repeat biopsies not performed.  · The duodenal bulb, 1st part of the duodenum, 2nd part of the duodenum and major papilla appeared normal. Previous biopsies negative.  Repeat biopsies not performed.    FINDINGS:  · The terminal ileum and entire colon appeared normal.  · Performed multiple forceps biopsies in the terminal ileum to rule out IBD  · Performed multiple pancolonic forceps biopsies to rule                           out IBD     Admission on 10/13/2024, Discharged on 10/13/2024   Component Date Value    WBC 10/13/2024 10.75 (H)     RBC 10/13/2024 4.88     Hemoglobin 10/13/2024 13.7     Hematocrit 10/13/2024 41.8     MCV 10/13/2024 86     MCH 10/13/2024 28.1     MCHC 10/13/2024 32.8     RDW 10/13/2024 13.5     MPV 10/13/2024 8.7 (L)     Platelets 10/13/2024 494 (H)     nRBC 10/13/2024 0     Segmented % 10/13/2024 73     Immature Grans % 10/13/2024 0     Lymphocytes % 10/13/2024 18     Monocytes % 10/13/2024 7     Eosinophils Relative 10/13/2024 1     Basophils Relative 10/13/2024 1     Absolute Neutrophils 10/13/2024 7.90 (H)     Absolute Immature Grans 10/13/2024 0.02     Absolute Lymphocytes 10/13/2024 1.95     Absolute Monocytes 10/13/2024 0.72     Eosinophils Absolute 10/13/2024 0.10     Basophils Absolute 10/13/2024 0.06     Sodium 10/13/2024 134 (L)     Potassium 10/13/2024 3.7     Chloride 10/13/2024 103     CO2 10/13/2024 22     ANION GAP 10/13/2024 9     BUN 10/13/2024 7     Creatinine 10/13/2024 0.73     Glucose 10/13/2024 98     Calcium 10/13/2024 9.4     AST 10/13/2024 15     ALT 10/13/2024 15     Alkaline Phosphatase 10/13/2024 66     Total Protein 10/13/2024 7.7     Albumin 10/13/2024 4.0     Total Bilirubin 10/13/2024 0.60     eGFR  10/13/2024 108     Lipase 10/13/2024 27     SARS COV Rapid Antigen 10/13/2024 Negative     Influenza A Rapid Antigen 10/13/2024 Negative     Influenza B Rapid Antigen 10/13/2024 Negative     Color, UA 10/13/2024 Yellow     Clarity, UA 10/13/2024 Clear     Specific Gravity, UA 10/13/2024 1.025     pH, UA 10/13/2024 6.0     Leukocytes, UA 10/13/2024 Negative     Nitrite, UA 10/13/2024 Negative     Protein, UA 10/13/2024 Negative     Glucose, UA 10/13/2024 Negative     Ketones, UA 10/13/2024 15 (1+) (A)     Urobilinogen, UA 10/13/2024 0.2     Bilirubin, UA 10/13/2024 Negative     Occult Blood, UA 10/13/2024 Negative     Urine Culture 10/13/2024 10,000-19,000 cfu/ml    Admission on 10/10/2024, Discharged on 10/10/2024   Component Date Value    Color, UA 10/10/2024 Yellow     Clarity, UA 10/10/2024 Clear     Specific Gravity, UA 10/10/2024 1.020     pH, UA 10/10/2024 6.5     Leukocytes, UA 10/10/2024 1+ (A)     Nitrite, UA 10/10/2024 Negative     Protein, UA 10/10/2024 Negative     Glucose, UA 10/10/2024 Negative     Ketones, UA 10/10/2024 Negative     Urobilinogen, UA 10/10/2024 0.2     Bilirubin, UA 10/10/2024 Negative     Occult Blood, UA 10/10/2024 Negative     EXT Preg Test, Ur 10/10/2024 Negative     Control 10/10/2024 Valid     WBC 10/10/2024 8.30     RBC 10/10/2024 4.21     Hemoglobin 10/10/2024 11.8     Hematocrit 10/10/2024 36.4     MCV 10/10/2024 87     MCH 10/10/2024 28.0     MCHC 10/10/2024 32.4     RDW 10/10/2024 13.6     MPV 10/10/2024 8.9     Platelets 10/10/2024 447 (H)     nRBC 10/10/2024 0     Segmented % 10/10/2024 47     Immature Grans % 10/10/2024 0     Lymphocytes % 10/10/2024 41     Monocytes % 10/10/2024 8     Eosinophils Relative 10/10/2024 4     Basophils Relative 10/10/2024 0     Absolute Neutrophils 10/10/2024 3.85     Absolute Immature Grans 10/10/2024 0.01     Absolute Lymphocytes 10/10/2024 3.42     Absolute Monocytes 10/10/2024 0.70     Eosinophils Absolute 10/10/2024 0.29     Basophils  Absolute 10/10/2024 0.03     Sodium 10/10/2024 136     Potassium 10/10/2024 3.6     Chloride 10/10/2024 106     CO2 10/10/2024 20 (L)     ANION GAP 10/10/2024 10     BUN 10/10/2024 9     Creatinine 10/10/2024 0.53 (L)     Glucose 10/10/2024 103     Calcium 10/10/2024 8.9     AST 10/10/2024 10 (L)     ALT 10/10/2024 13     Alkaline Phosphatase 10/10/2024 56     Total Protein 10/10/2024 6.6     Albumin 10/10/2024 3.6     Total Bilirubin 10/10/2024 0.31     eGFR 10/10/2024 125     Magnesium 10/10/2024 1.9     Lipase 10/10/2024 34     RBC, UA 10/10/2024 1-2     WBC, UA 10/10/2024 10-20 (A)     Epithelial Cells 10/10/2024 Moderate (A)     Bacteria, UA 10/10/2024 Occasional     Urine Culture 10/10/2024 >100,000 cfu/ml Beta Hemolytic Streptococcus Group B (A)    There may be more visits with results that are not included.     Substance Abuse History:  Social History     Substance and Sexual Activity   Drug Use Yes    Types: Marijuana    Comment: medical marijuana -smokes flower       Family Psychiatric History:   Family History   Problem Relation Name Age of Onset    Rheum arthritis Mother      Psoriasis Mother      Other Mother      Hypertension Mother      Diabetes unspecified Mother      Sjogren's syndrome Mother      Alcohol abuse Mother      Drug abuse Mother      Anxiety disorder Father      Alcohol abuse Father      Lung cancer Maternal Grandfather      Cancer Other          bone    Diabetes Other      Other Other          High blood pressure    Depression Maternal Grandmother         The following portions of the patient's history were reviewed and updated as appropriate: allergies, current medications, past family history, past medical history, past social history, past surgical history and problem list.    Social History     Socioeconomic History    Marital status: Single     Spouse name: Not on file    Number of children: 0    Years of education: 12    Highest education level: Not on file   Occupational History     Occupation: Unemployed     Employer: [employer]   Tobacco Use    Smoking status: Never     Passive exposure: Past    Smokeless tobacco: Never   Vaping Use    Vaping status: Never Used   Substance and Sexual Activity    Alcohol use: Not Currently     Comment: rarely    Drug use: Yes     Types: Marijuana     Comment: medical marijuana -smokes flower    Sexual activity: Not Currently     Partners: Male     Birth control/protection: Ring     Comment: Nuva ring   Other Topics Concern    Not on file   Social History Narrative    Home:  Living with mom and MGM        Education:    Pt denies any h/o learning disability Dxs but admits to having great difficulty in math requiring a .  She reached childhood milestones on time as far as he knows.    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 On reflection, she feels it was moreso her anxiety as the cause of her leaving school, and she was using the other reason as an excuse so she would not have to admit to anxiety at that time.  She is now very open about her anxiety and does not mind that I have this information in the general social section of her chart.     Social Drivers of Health     Financial Resource Strain: Low Risk  (8/16/2022)    Overall Financial Resource Strain (CARDIA)     Difficulty of Paying Living Expenses: Not hard at all   Food Insecurity: No Food Insecurity (8/16/2022)    Hunger Vital Sign     Worried About Running Out of Food in the Last Year: Never true     Ran Out of Food in the Last Year: Never true   Transportation Needs: No Transportation Needs (8/16/2022)    PRAPARE - Transportation     Lack of Transportation (Medical): No     Lack of Transportation (Non-Medical): No   Physical Activity: Unknown (2/11/2021)    Exercise Vital Sign      Days of Exercise per Week: 0 days     Minutes of Exercise per Session: Not on file   Stress: Not on file   Social Connections: Not on file   Intimate Partner Violence: Not on file   Housing Stability: Not on file     Social History     Social History Narrative    Home:  Living with mom and MGM        Education:    Pt denies any h/o learning disability Dxs but admits to having great difficulty in math requiring a .  She reached childhood milestones on time as far as he knows.    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 On reflection, she feels it was moreso her anxiety as the cause of her leaving school, and she was using the other reason as an excuse so she would not have to admit to anxiety at that time.  She is now very open about her anxiety and does not mind that I have this information in the general social section of her chart.       Objective:       Mental status:  Appearance calm and cooperative , adequate hygiene and grooming and good eye contact    Mood dysphoric   Affect affect was constricted   Speech a normal rate and fluent   Thought Processes coherent/organized and normal thought processes   Hallucinations no hallucinations present    Thought Content no delusions   Abnormal Thoughts no suicidal thoughts  and no homicidal thoughts    Orientation  oriented to person and place and time   Remote Memory short term memory intact and long term memory intact   Attention Span concentration intact   Intellect Appears to be of Average Intelligence   Insight Limited insight   Judgement judgment was limited   Muscle Strength n/a   Language no difficulty naming common objects and no difficulty repeating a phrase    Fund of Knowledge displays adequate knowledge of current events, adequate fund of  knowledge regarding past history and adequate fund of knowledge regarding vocabulary                Assessment/Plan:       There are no diagnoses linked to this encounter.        Assessment & Plan  Depression, major, recurrent, moderate (HCC)         Generalized anxiety disorder         Panic disorder without agoraphobia                          Treatment Recommendations- Risks Benefits      Immediate Medical/Psychiatric/Psychotherapy Treatments and Any Precautions: as stated on HPI     Risks, Benefits And Possible Side Effects Of Medications:  {PSYCH RISK, BENEFITS AND POSSIBLE SIDE EFFECTS (Optional):94888    Controlled Medication Discussion: Discussed with patient Black Box warning on concurrent use of benzodiazepines and opioid medications including sedation, respiratory depression, coma and death. Patient understands the risk of treatment with benzodiazepines in addition to opioids and wants to continue taking those medications. , Discussed with patient the risks of sedation, respiratory depression, impairment of ability to drive and potential for abuse and addiction related to treatment with benzodiazepine medications. The patient understands risk of treatment with benzodiazepine medications, agrees to not drive if feels impaired and agrees to take medications as prescribed. and The patient has been filling controlled prescriptions on time as prescribed to [place] Prescription Drug Monitoring program.      Psychotherapy Provided:    No    The Patient is located at Home and in the following state in which I hold an active license [place].    The patient was identified by name and date of birth. Patient  was informed that this is a telemedicine visit and that the visit is being conducted through the Epic Embedded platform. She agrees to proceed..  My office door was closed. No one else was in the room.  She acknowledged consent and understanding of privacy and security of the video platform. The patient has  agreed to participate and understands they can discontinue the visit at any time.    I have spent a total time of 30 minutes in caring for this patient on the day of the visit/encounter including Prognosis, Risks and benefits of tx options, Instructions for management, Patient and family education, Importance of tx compliance, Risk factor reductions, Impressions, Documenting in the medical record, Reviewing/placing orders in the medical record (including tests, medications, and/or procedures), and Obtaining or reviewing history  , not including the time spent for establishing the audio/video connection.          Visit Time    Visit Start Time: 10;00  Visit Stop Time: 10:30  Total Visit Duration: 30 minutes

## 2025-06-04 NOTE — TELEPHONE ENCOUNTER
Called and left message for patient to return a call to [phone] and schedule 8 week follow up with provider (Dr Nunn). Please schedule upon return call. Thank you.

## 2025-06-06 ENCOUNTER — TELEMEDICINE (OUTPATIENT)
Dept: BEHAVIORAL/MENTAL HEALTH CLINIC | Facility: CLINIC | Age: 35
End: 2025-06-06
Payer: COMMERCIAL

## 2025-06-06 DIAGNOSIS — F33.1 DEPRESSION, MAJOR, RECURRENT, MODERATE (HCC): Primary | Chronic | ICD-10-CM

## 2025-06-06 DIAGNOSIS — F41.1 GENERALIZED ANXIETY DISORDER: Chronic | ICD-10-CM

## 2025-06-06 PROCEDURE — 90837 PSYTX W PT 60 MINUTES: CPT | Performed by: SOCIAL WORKER

## 2025-06-06 NOTE — PSYCH
Virtual Regular VisitName: Cruz Schneider      : 1990      MRN: [id number]  Encounter Provider: APARNA Fox  Encounter Date: 2025   Encounter department: [facility]  Assessment & Plan  Depression, major, recurrent, moderate (HCC)       Generalized anxiety disorder       Depression, major, recurrent, moderate (HCC)     Generalized anxiety disorder     Goals addressed in session: Goal 1     DATA: Met with Stephanie for her scheduled individual session. Stephanie discussed her physical health concerns. She states that she continues to struggle with physical pain, which she attributes, in large part, to the weather. She states that, due to the weather (rain and heat) she has not been able to get much physical movement. She reports that, when the weather is better, she hopes to be able to increase her physical movement and time spent outdoors. She states that she has been spending some time with Jacqueline's children; however, she is doing well with maintaining some emotional distance from Jacqueline. She states that she is feeling less connection with Jacqueline and is trying to make a plan for how she will relate to the twins that Jacqueline is currently carrying. She states that she wants to continue to move forward with her relationship with Jacqueline in a more boundaried manner, as she is recognizing that Jacqueline is unable to provide her with the support she wishes she could give her. Stephanie is continuing to attend community activities with her mother. She has identified another festival she plans to attend later this month. She has connected with some people at these festivals who she plans to connect with (sometimes professionals with whom she might receive services from and sometimes people she might connect with socially). This clinician continues to encourage her to increase her social network, as a major part of her overall recovery plan.     During this session, this  "clinician used the following therapeutic modalities: Client-centered Therapy, Dialectical Behavior Therapy, Mindfulness-based Strategies, Motivational Interviewing, Solution-Focused Therapy, and Supportive Psychotherapy    Substance Abuse was not addressed during this session. If the client is diagnosed with a co-occurring substance use disorder, please indicate any changes in the frequency or amount of use: n/a. Stage of change for addressing substance use diagnoses: No substance use/Not applicable    ASSESSMENT:  Cruz presents with a Euthymic/ normal mood. Jomars affect is Normal range and intensity, which is congruent, with their mood and the content of the session. The client has made progress on their goals as evidenced by her ability to maintain some appropriate boundaries and limits with her friend (Jacqueline), as well as attend some community events where she has met other people she might be able to form social connections with.    Cruz presents with a minimal risk of suicide, minimal risk of self-harm, and minimal risk of harm to others.    For any risk assessment that surpasses a \"low\" rating, a safety plan must be developed.    A safety plan was indicated: no  If yes, describe in detail n/a    PLAN: Between sessions, Cruz will continue to monitor her moods. She will continue to increase her social engagement in the community. She will also continue to increase her physical movement, as she is able. Stephanie will continue to set/maintain appropriate boundaries with friends/family. At the next session, the therapist will use Client-centered Therapy, Dialectical Behavior Therapy, Mindfulness-based Strategies, Motivational Interviewing, Solution-Focused Therapy, and Supportive Psychotherapy to address her mood regulation, health-related concerns, and social concerns.    Behavioral Health Treatment Plan [facility]: Diagnosis and Treatment Plan explained to Cruz, Cruz relates " understanding diagnosis and is agreeable to Treatment Plan. Yes     Depression Follow-up Plan Completed: No     Reason for visit is   Chief Complaint   Patient presents with    Virtual Regular Visit      Recent Visits  Date Type Provider Dept   06/06/25 Telemedicine APARNA Fox [facility]rap[facility] [place]   Showing recent visits within past 7 days and meeting all other requirements  Future Appointments  No visits were found meeting these conditions.  Showing future appointments within next 150 days and meeting all other requirements     History of Present Illness     HPI    Past Medical History   Past Medical History:   Diagnosis Date    Abnormal Pap smear of cervix     Allergic     Anxiety     Arthritis     Dental caries     Depression     Fibromyalgia, primary     GERD (gastroesophageal reflux disease)     IBS (irritable bowel syndrome)     Migraine     Obesity     Vitamin B12 deficiency      Past Surgical History:   Procedure Laterality Date    COLONOSCOPY N/A 5/8/2018    Procedure: COLONOSCOPY;  Surgeon: Stephanie Alston MD;  Location: [facility] GI [facility];  Service: Gastroenterology    MA EXC B9 LESION MRGN XCP SK TG S/N/H/F/G > 4.0CM N/A 7/1/2021    Procedure: EXCISION OF PILAR SCALP CYST X 2 AND RIGHT INNER GROIN NEVVUS;  Surgeon: Denis Barron MD;  Location:  MAIN OR;  Service: Plastics    MA LAPS SURG CHOLECYSTECTOMY W/CHOLANGIOGRAPHY N/A 12/26/2024    Procedure: CHOLECYSTECTOMY LAPAROSCOPIC;  Surgeon: Kulwinder Nunn MD;  Location: [facility];  Service: General    MA REPAIR COMPLEX SCALP/ARM/LEG 2.6-7.5 CM N/A 7/1/2021    Procedure: CLOSURE WOUND SCALP X 2 AND RIGHT INNER GROIN;  Surgeon: Denis Barron MD;  Location:  MAIN OR;  Service: Plastics    TONSILLECTOMY      WISDOM TOOTH EXTRACTION       Current Outpatient Medications   Medication Instructions    acetaminophen (TYLENOL) 500 mg, Every 6 hours PRN    ALPRAZolam (XANAX) 0.5 mg, Oral, 3 times daily PRN    ascorbic acid (VITAMIN  C) 500 mg, Daily    Botox 200 units SOLR     buPROPion (WELLBUTRIN XL) 300 mg, Oral, Daily    cyanocobalamin 1,000 mcg/mL 1 IM injection every month    dihydroergotamine (MIGRANAL) 4 MG/ML nasal spray 1 spray, Nasal, As needed, Use in one nostril as directed.  No more than 4 sprays in one hour    DULoxetine (CYMBALTA) 40 mg, Oral, Daily    etonogestrel-ethinyl estradiol (NuvaRing) 0.12-0.015 MG/24HR vaginal ring Insert ring for 21 days then remove for one week.    gabapentin (NEURONTIN) 300 mg, Oral, 3 times daily    Galcanezumab-gnlm (Emgality) 120 MG/ML SOAJ Inject as directed 1 ml Subcutaneous  every 30 days    meloxicam (MOBIC) 15 mg, Oral, Daily PRN    methocarbamol (ROBAXIN) 500 mg, Oral, 3 times daily    mupirocin (BACTROBAN) 2 % cream Apply bid to areas needed    nystatin (MYCOSTATIN) cream Topical, 2 times daily    OLANZapine (ZYPREXA) 7.5 mg, Oral, Daily at bedtime, Do not take with reglan.    ondansetron (ZOFRAN) 4 mg, Oral, Every 6 hours    polyethylene glycol (MIRALAX) 17 g, As needed    pramipexole (MIRAPEX) 0.25 mg, Oral, Daily at bedtime    prazosin (MINIPRESS) 2 mg, Oral, Daily at bedtime    RABEprazole (ACIPHEX) 20 mg, Oral, 2 times daily    sodium chloride (OCEAN) 0.65 % nasal spray 1 spray, Nasal, As needed    Trudhesa 0.725 MG/ACT AERS 1 spray in each nostril for total dose of 1.45mg, may repeat 1 dose after 1 hour. Max 2 doses per 24 hours and 3 doses per week.    Vitamin D3 2,000 Units, Oral, Daily    Wegovy 0.25 mg, Subcutaneous, Weekly, Inject 0.25 mg under the skin weekly     Allergies   Allergen Reactions    Cimzia [Certolizumab Pegol] Swelling     Face and hands    Desvenlafaxine Other (See Comments)     Other reaction(s): state of confusion    Ixekizumab Vomiting    Seasonal Ic [Octacosanol] Nasal Congestion    Tizanidine Anxiety     Panic attacks       Objective   There were no vitals taken for this visit.    Video Exam  Physical Exam     Administrative Statements   Encounter provider  APARNA Fox    The Patient is located at Home and in the following state in which I hold an active license [place].    The patient was identified by name and date of birth. Cruz Schneider was informed that this is a telemedicine visit and that the visit is being conducted through the Epic Embedded platform. She agrees to proceed..  My office door was closed. No one else was in the room.  She acknowledged consent and understanding of privacy and security of the video platform. The patient has agreed to participate and understands they can discontinue the visit at any time.    Visit Time  Start Time: 0804  Stop Time: 0858  Total Visit Time: 54 minutes

## 2025-06-10 ENCOUNTER — TELEPHONE (OUTPATIENT)
Age: 35
End: 2025-06-10

## 2025-06-10 NOTE — TELEPHONE ENCOUNTER
Patient contacted the office to schedule a follow up visit with provider Dr Edouard Guadalupe . Patient is now scheduled for 8/4/25 at 1:30 pm virtually.

## 2025-06-11 DIAGNOSIS — E66.01 MORBID OBESITY WITH BMI OF 40.0-44.9, ADULT (HCC): ICD-10-CM

## 2025-06-12 RX ORDER — SEMAGLUTIDE 0.25 MG/.5ML
0.25 INJECTION, SOLUTION SUBCUTANEOUS WEEKLY
Qty: 2 ML | Refills: 0 | Status: SHIPPED | OUTPATIENT
Start: 2025-06-12

## 2025-06-17 DIAGNOSIS — E66.813 CLASS 3 SEVERE OBESITY DUE TO EXCESS CALORIES WITH BODY MASS INDEX (BMI) OF 45.0 TO 49.9 IN ADULT: Primary | ICD-10-CM

## 2025-06-17 NOTE — TELEPHONE ENCOUNTER
Patient called and said she is suppose to get 0.5 ML of Wegovy but 0.05 mg was ordered. Please call patient to discuss this.

## 2025-06-18 ENCOUNTER — PATIENT MESSAGE (OUTPATIENT)
Age: 35
End: 2025-06-18

## 2025-06-18 DIAGNOSIS — E66.9 OBESITY (BMI 30-39.9): Primary | ICD-10-CM

## 2025-06-18 RX ORDER — SEMAGLUTIDE 0.5 MG/.5ML
INJECTION, SOLUTION SUBCUTANEOUS
Qty: 2 ML | Refills: 0 | Status: CANCELLED | OUTPATIENT
Start: 2025-06-18

## 2025-06-18 RX ORDER — SEMAGLUTIDE 0.5 MG/.5ML
INJECTION, SOLUTION SUBCUTANEOUS
Qty: 2 ML | Refills: 0 | Status: SHIPPED | OUTPATIENT
Start: 2025-06-18 | End: 2025-06-18 | Stop reason: SDUPTHER

## 2025-06-18 RX ORDER — SEMAGLUTIDE 0.5 MG/.5ML
INJECTION, SOLUTION SUBCUTANEOUS
Qty: 2 ML | Refills: 0 | Status: SHIPPED | OUTPATIENT
Start: 2025-06-18

## 2025-06-18 NOTE — PATIENT COMMUNICATION
Patient called and stated she was notified that her wegovy prescription had been sent tot he wrong pharmacy. Upon chart review I saw that there were two scripts of wegovy sent in to two separate pharmacies. Patient advised. Patient stated to please remove the one order that went to [facility] and to please keep the one for [facility] in [place]

## 2025-06-20 ENCOUNTER — TELEMEDICINE (OUTPATIENT)
Dept: BEHAVIORAL/MENTAL HEALTH CLINIC | Facility: CLINIC | Age: 35
End: 2025-06-20
Payer: COMMERCIAL

## 2025-06-20 DIAGNOSIS — F41.1 GENERALIZED ANXIETY DISORDER: Chronic | ICD-10-CM

## 2025-06-20 DIAGNOSIS — F33.1 DEPRESSION, MAJOR, RECURRENT, MODERATE (HCC): Primary | Chronic | ICD-10-CM

## 2025-06-20 PROCEDURE — 90834 PSYTX W PT 45 MINUTES: CPT | Performed by: SOCIAL WORKER

## 2025-06-20 NOTE — PSYCH
Virtual Regular VisitName: Cruz Schneider      : 1990      MRN: [id number]  Encounter Provider: APARNA Fox  Encounter Date: 2025   Encounter department: [facility] [place]  :  Assessment & Plan  Depression, major, recurrent, moderate (HCC)         Generalized anxiety disorder         Depression, major, recurrent, moderate (HCC)     Generalized anxiety disorder     Goals addressed in session: Goal 1     DATA: Met with Stephanie for her scheduled individual session. Stephanie states that she has been experiencing a somewhat improved mood. She reports that she has had some anxiety; however, she has been working on increasing her attempts to get out and engage in activities that bring her a feeling of accomplishment. She reports that she will be attending a holistic expo with her mother. She states that the weather has had a negative impact on her ability to exercise-- as the rain has been almost a daily occurrence. In addition, the next week's forecast is for very high temperatures. Stephanie discussed her thoughts and feelings about her ability to do crafting. She states that she has historically enjoyed crafting activities; however, her last boyfriend criticized her efforts and told her that she was not good at these activities. She states that she often avoids the activities now, and feels that she is not good at them. Stephanie states that she ordered a DBT Skills coloring book. This clinician states that is a good way for her to self-validate her crafting abilities while also practicing her DBT skills. She is expecting the book to arrive soon, and she plans to begin coloring the pages as soon as it arrives. She states that she plans to color the pages and practice the skills at the same time. Stephanie discussed continuing to set limits/boundaries with her friend Jacqueline. She states that she feels that she continues to do well with setting limits/boundaries with her.  "    During this session, this clinician used the following therapeutic modalities: Client-centered Therapy, Dialectical Behavior Therapy, Mindfulness-based Strategies, Motivational Interviewing, Solution-Focused Therapy, and Supportive Psychotherapy    Substance Abuse was not addressed during this session. If the client is diagnosed with a co-occurring substance use disorder, please indicate any changes in the frequency or amount of use: n/a. Stage of change for addressing substance use diagnoses: No substance use/Not applicable    ASSESSMENT:  Cruz presents with a Euthymic/ normal mood. Jomars affect is Normal range and intensity, which is congruent, with their mood and the content of the session. The client has made progress on their goals as evidenced by her ability to set limits/boundaries with her friend. In addition, she is finding ways to increase her practice of skills and stretch herself to engage in other activities in her community.    Cruz presents with a minimal risk of suicide, minimal risk of self-harm, and minimal risk of harm to others.    For any risk assessment that surpasses a \"low\" rating, a safety plan must be developed.    A safety plan was indicated: no  If yes, describe in detail n/a    PLAN: Between sessions, Cruz will continue to work on increasing her use of mindfulness and DBT-informed skills. She will monitor her moods and engage in activities that bring her ciara and feelings of accomplishment. At the next session, the therapist will use Client-centered Therapy, Dialectical Behavior Therapy, Mindfulness-based Strategies, Motivational Interviewing, Solution-Focused Therapy, and Supportive Psychotherapy to address her mood regulation and relationship concerns.    Behavioral Health Treatment Plan [facility]: Diagnosis and Treatment Plan explained to Cruz, Cruz relates understanding diagnosis and is agreeable to Treatment Plan. Yes     Depression Follow-up Plan " Completed: No     Reason for visit is   Chief Complaint   Patient presents with    Virtual Regular Visit      Recent Visits  Date Type Provider Dept   06/20/25 Telemedicine APARNA Fox [facility] [place]   Showing recent visits within past 7 days and meeting all other requirements  Future Appointments  No visits were found meeting these conditions.  Showing future appointments within next 150 days and meeting all other requirements     History of Present Illness     HPI    Past Medical History   Past Medical History:   Diagnosis Date    Abnormal Pap smear of cervix     Allergic     Anxiety     Arthritis     Dental caries     Depression     Fibromyalgia, primary     GERD (gastroesophageal reflux disease)     IBS (irritable bowel syndrome)     Migraine     Obesity     Vitamin B12 deficiency      Past Surgical History:   Procedure Laterality Date    COLONOSCOPY N/A 5/8/2018    Procedure: COLONOSCOPY;  Surgeon: Stephanie Alston MD;  Location: [facility] GI LAB;  Service: Gastroenterology    OH EXC B9 LESION MRGN XCP SK TG S/N/H/F/G > 4.0CM N/A 7/1/2021    Procedure: EXCISION OF PILAR SCALP CYST X 2 AND RIGHT INNER GROIN NEVVUS;  Surgeon: Denis Barron MD;  Location:  MAIN OR;  Service: Plastics    OH LAPS SURG CHOLECYSTECTOMY W/CHOLANGIOGRAPHY N/A 12/26/2024    Procedure: CHOLECYSTECTOMY LAPAROSCOPIC;  Surgeon: Kulwinder Nunn MD;  Location: [facility] MA[facility];  Service: General    OH REPAIR COMPLEX SCALP/ARM/LEG 2.6-7.5 CM N/A 7/1/2021    Procedure: CLOSURE WOUND SCALP X 2 AND RIGHT INNER GROIN;  Surgeon: Denis Barron MD;  Location:  MAIN OR;  Service: Plastics    TONSILLECTOMY      WISDOM TOOTH EXTRACTION       Current Outpatient Medications   Medication Instructions    acetaminophen (TYLENOL) 500 mg, Every 6 hours PRN    ALPRAZolam (XANAX) 0.5 mg, Oral, 3 times daily PRN    ascorbic acid (VITAMIN C) 500 mg, Daily    Botox 200 units SOLR     buPROPion (WELLBUTRIN XL) 300 mg, Oral, Daily     cyanocobalamin 1,000 mcg/mL 1 IM injection every month    dihydroergotamine (MIGRANAL) 4 MG/ML nasal spray 1 spray, Nasal, As needed, Use in one nostril as directed.  No more than 4 sprays in one hour    DULoxetine (CYMBALTA) 40 mg, Oral, Daily    etonogestrel-ethinyl estradiol (NuvaRing) 0.12-0.015 MG/24HR vaginal ring Insert ring for 21 days then remove for one week.    gabapentin (NEURONTIN) 300 mg, Oral, 3 times daily    Galcanezumab-gnlm (Emgality) 120 MG/ML SOAJ Inject as directed 1 ml Subcutaneous  every 30 days    meloxicam (MOBIC) 15 mg, Oral, Daily PRN    methocarbamol (ROBAXIN) 500 mg, Oral, 3 times daily    mupirocin (BACTROBAN) 2 % cream Apply bid to areas needed    nystatin (MYCOSTATIN) cream Topical, 2 times daily    OLANZapine (ZYPREXA) 7.5 mg, Oral, Daily at bedtime, Do not take with reglan.    ondansetron (ZOFRAN) 4 mg, Oral, Every 6 hours    polyethylene glycol (MIRALAX) 17 g, As needed    pramipexole (MIRAPEX) 0.25 mg, Oral, Daily at bedtime    prazosin (MINIPRESS) 2 mg, Oral, Daily at bedtime    RABEprazole (ACIPHEX) 20 mg, Oral, 2 times daily    Semaglutide-Weight Management (Wegovy) 0.5 MG/0.5ML Inject 0.5 mg under the skin weekly    sodium chloride (OCEAN) 0.65 % nasal spray 1 spray, Nasal, As needed    Trudhesa 0.725 MG/ACT AERS 1 spray in each nostril for total dose of 1.45mg, may repeat 1 dose after 1 hour. Max 2 doses per 24 hours and 3 doses per week.    Vitamin D3 2,000 Units, Oral, Daily    Wegovy 0.25 mg, Subcutaneous, Weekly, Inject 0.25 mg under the skin weekly     Allergies   Allergen Reactions    Cimzia [Certolizumab Pegol] Swelling     Face and hands    Desvenlafaxine Other (See Comments)     Other reaction(s): state of confusion    Ixekizumab Vomiting    Seasonal Ic [Octacosanol] Nasal Congestion    Tizanidine Anxiety     Panic attacks       Objective   There were no vitals taken for this visit.    Video Exam  Physical Exam     Administrative Statements   Encounter provider  APARNA Fox    The Patient is located at Home and in the following state in which I hold an active license [place].    The patient was identified by name and date of birth. Cruz Schneider was informed that this is a telemedicine visit and that the visit is being conducted through the Epic Embedded platform. She agrees to proceed..  My office door was closed. No one else was in the room.  She acknowledged consent and understanding of privacy and security of the video platform. The patient has agreed to participate and understands they can discontinue the visit at any time.    Visit Time  Start Time: 0812  Stop Time: 0902  Total Visit Time: 50 minutes

## 2025-06-24 NOTE — PROGRESS NOTES
Procedure Details  14 MO  - RESIN-BASED COMPOSITE - 2 SURFACES, POSTERIOR  15 B(V)O  - RESIN-BASED COMPOSITE - 2 SURFACES, POSTERIOR  Composite Filling    Cruz Schneider presents for composite filling. PMH reviewed, no changes.    Discussed with patient need for RCT if pulp exposure occurs or in future if pulp is inflamed. Pt understands and consents.    Applied topical benzocaine, administered 1 carp 4% articaine 1:100k epi via buccal infiltration     Prepped tooth #14 MO, 15 OB with 835 moshe on high speed. Caries removed with round carbide on slow speed. Placed Mancini matrix. Isolation with cotton rolls and dri-angles    Etch with 37% H2PO4, rinse, dry. Applied Adhese with 20 second scrub once, gentle air dry and light cured for 10s. Restored with Tetric bulk haley shade A2 and light cured.    Refined with finishing burs, polished with enhance point. Verified occlusion and contacts. Pt left satisfied.    NV:Leidy, 60 min, #2 GARETT, 3 GARETT, 5 O

## 2025-06-24 NOTE — DENTAL PROCEDURE DETAILS
Composite Filling    Cruz Schneider presents for composite filling. PMH reviewed, no changes.    Discussed with patient need for RCT if pulp exposure occurs or in future if pulp is inflamed. Pt understands and consents.    Applied topical benzocaine, administered 1 carp 4% articaine 1:100k epi via buccal infiltration     Prepped tooth #14 MO, 15 OB with 835 moshe on high speed. Caries removed with round carbide on slow speed. Placed Mancini matrix. Isolation with cotton rolls and dri-angles    Etch with 37% H2PO4, rinse, dry. Applied Adhese with 20 second scrub once, gentle air dry and light cured for 10s. Restored with Tetric bulk haley shade A2 and light cured.    Refined with finishing burs, polished with enhance point. Verified occlusion and contacts. Pt left satisfied.    NV:Leidy, 60 min, #2 GARETT, 3 GARETT, 5 O

## 2025-07-11 ENCOUNTER — TELEMEDICINE (OUTPATIENT)
Dept: BEHAVIORAL/MENTAL HEALTH CLINIC | Facility: CLINIC | Age: 35
End: 2025-07-11
Payer: COMMERCIAL

## 2025-07-11 DIAGNOSIS — F41.1 GENERALIZED ANXIETY DISORDER: Chronic | ICD-10-CM

## 2025-07-11 DIAGNOSIS — F33.1 DEPRESSION, MAJOR, RECURRENT, MODERATE (HCC): Primary | Chronic | ICD-10-CM

## 2025-07-11 PROCEDURE — 90837 PSYTX W PT 60 MINUTES: CPT | Performed by: SOCIAL WORKER

## 2025-07-14 ENCOUNTER — OFFICE VISIT (OUTPATIENT)
Age: 35
End: 2025-07-14
Payer: COMMERCIAL

## 2025-07-14 ENCOUNTER — RESULTS FOLLOW-UP (OUTPATIENT)
Age: 35
End: 2025-07-14

## 2025-07-14 ENCOUNTER — APPOINTMENT (OUTPATIENT)
Dept: RADIOLOGY | Facility: CLINIC | Age: 35
End: 2025-07-14
Payer: COMMERCIAL

## 2025-07-14 VITALS
TEMPERATURE: 98.3 F | WEIGHT: 238.2 LBS | SYSTOLIC BLOOD PRESSURE: 134 MMHG | BODY MASS INDEX: 42.21 KG/M2 | HEIGHT: 63 IN | OXYGEN SATURATION: 98 % | DIASTOLIC BLOOD PRESSURE: 88 MMHG | HEART RATE: 105 BPM

## 2025-07-14 DIAGNOSIS — M79.672 LEFT FOOT PAIN: ICD-10-CM

## 2025-07-14 DIAGNOSIS — E66.813 CLASS 3 SEVERE OBESITY DUE TO EXCESS CALORIES WITH BODY MASS INDEX (BMI) OF 45.0 TO 49.9 IN ADULT: ICD-10-CM

## 2025-07-14 DIAGNOSIS — M54.50 ACUTE LEFT-SIDED LOW BACK PAIN WITHOUT SCIATICA: Primary | ICD-10-CM

## 2025-07-14 DIAGNOSIS — S81.811A LACERATION OF RIGHT LOWER EXTREMITY, INITIAL ENCOUNTER: ICD-10-CM

## 2025-07-14 DIAGNOSIS — M41.86 OTHER FORM OF SCOLIOSIS OF LUMBAR SPINE: ICD-10-CM

## 2025-07-14 DIAGNOSIS — Z91.81 STATUS POST FALL: ICD-10-CM

## 2025-07-14 DIAGNOSIS — M54.50 ACUTE LEFT-SIDED LOW BACK PAIN WITHOUT SCIATICA: ICD-10-CM

## 2025-07-14 PROCEDURE — 73630 X-RAY EXAM OF FOOT: CPT

## 2025-07-14 PROCEDURE — 99214 OFFICE O/P EST MOD 30 MIN: CPT | Performed by: FAMILY MEDICINE

## 2025-07-14 PROCEDURE — 72100 X-RAY EXAM L-S SPINE 2/3 VWS: CPT

## 2025-07-14 RX ORDER — MELOXICAM 15 MG/1
15 TABLET ORAL DAILY PRN
Qty: 30 TABLET | Refills: 0 | Status: SHIPPED | OUTPATIENT
Start: 2025-07-14

## 2025-07-14 RX ORDER — SEMAGLUTIDE 0.5 MG/.5ML
INJECTION, SOLUTION SUBCUTANEOUS
Qty: 4 ML | Refills: 0 | Status: SHIPPED | OUTPATIENT
Start: 2025-07-14

## 2025-07-14 NOTE — ASSESSMENT & PLAN NOTE
Orders:    XR foot 3+ vw left; Future    meloxicam (MOBIC) 15 mg tablet; Take 1 tablet (15 mg total) by mouth daily as needed for moderate pain

## 2025-07-14 NOTE — PROGRESS NOTES
"Name: Cruz Schneider      : 1990      MRN: [id number]  Encounter Provider: Adan Beckham DO  Encounter Date: 2025   Encounter department: [facility]  :  Assessment & Plan  Acute left-sided low back pain without sciatica    Orders:  •  XR spine lumbar 2 or 3 views injury; Future  •  meloxicam (MOBIC) 15 mg tablet; Take 1 tablet (15 mg total) by mouth daily as needed for moderate pain    Left foot pain    Orders:  •  XR foot 3+ vw left; Future  •  meloxicam (MOBIC) 15 mg tablet; Take 1 tablet (15 mg total) by mouth daily as needed for moderate pain    Laceration of right lower extremity, initial encounter  Tetanus shot up-to-date.  Patient will have local care with Neosporin.       Status post fall  Noted.  No head trauma.              History of Present Illness   Patient is here status post fall roughly 1 week ago.  Patient went down left foot and ultimately on her back/buttocks.  No head trauma noted.  No loss of consciousness.  Patient also with laceration to the right thigh posteriorly.  Ecchymosis on back.  Patient with back pain.  No chest pain or shortness of breath prior to fall.  Fall was mechanical.  Patient to use Tylenol.  No radicular symptoms from back.    Fall    Review of Systems   Constitutional: Negative.    HENT: Negative.     Eyes: Negative.    Respiratory: Negative.     Cardiovascular: Negative.    Gastrointestinal: Negative.    Endocrine: Negative.    Genitourinary: Negative.    Musculoskeletal:  Positive for arthralgias, back pain and gait problem.   Skin:  Positive for color change and wound.   Allergic/Immunologic: Negative.    Hematological:  Bruises/bleeds easily.   Psychiatric/Behavioral: Negative.         Objective   /88 (BP Location: Left arm, Patient Position: Sitting, Cuff Size: Large)   Pulse 105   Temp 98.3 °F (36.8 °C) (Temporal)   Ht 5' 3\" (1.6 m)   Wt 108 kg (238 lb 3.2 oz)   SpO2 98%   BMI 42.20 kg/m²      Physical Exam  Vitals " and nursing note reviewed.   Constitutional:       General: She is not in acute distress.     Appearance: Normal appearance. She is well-developed. She is not ill-appearing, toxic-appearing or diaphoretic.   HENT:      Head: Normocephalic and atraumatic.      Right Ear: External ear normal.      Left Ear: External ear normal.      Nose: Nose normal.      Mouth/Throat:      Pharynx: No oropharyngeal exudate.     Eyes:      General:         Right eye: No discharge.         Left eye: No discharge.     Neck:      Thyroid: No thyromegaly.      Trachea: No tracheal deviation.     Cardiovascular:      Rate and Rhythm: Normal rate and regular rhythm.      Pulses: Normal pulses.      Heart sounds: Normal heart sounds. No murmur heard.     No gallop.   Pulmonary:      Effort: Pulmonary effort is normal. No respiratory distress.      Breath sounds: Normal breath sounds. No stridor. No wheezing or rales.   Chest:      Chest wall: No tenderness.   Abdominal:      General: Bowel sounds are normal.     Musculoskeletal:         General: Tenderness and signs of injury present. No deformity.      Cervical back: Normal range of motion and neck supple.      Right lower leg: No edema.      Left lower leg: No edema.      Comments: Pain with palpation over the Achilles tendon on the left as well as over the proximal 4th and 5th metatarsal bones on the left.  Patient with pain against calcaneus.  No pain over fibular.  Pain with palpation over spinous processes lumbar region   Lymphadenopathy:      Cervical: No cervical adenopathy.     Skin:     General: Skin is warm and dry.      Coloration: Skin is not pale.      Findings: Bruising present. No erythema or rash.      Comments: Small superficial laceration right posterior thigh.  No significant erythema.     Neurological:      Mental Status: She is alert and oriented to person, place, and time. Mental status is at baseline.      Cranial Nerves: No cranial nerve deficit.      Motor: No  abnormal muscle tone.      Gait: Gait normal.     Psychiatric:         Mood and Affect: Mood normal.         Behavior: Behavior normal.         Thought Content: Thought content normal.         Judgment: Judgment normal.

## 2025-07-14 NOTE — ASSESSMENT & PLAN NOTE
Orders:    XR spine lumbar 2 or 3 views injury; Future    meloxicam (MOBIC) 15 mg tablet; Take 1 tablet (15 mg total) by mouth daily as needed for moderate pain

## 2025-07-15 ENCOUNTER — TELEPHONE (OUTPATIENT)
Age: 35
End: 2025-07-15

## 2025-07-16 NOTE — TELEPHONE ENCOUNTER
Patient should have a weight management apt. Insurance is going to require an update from baseline to current weight. They will also required education on calorie deficits, exercise,healthy life style changes to be documented, and patient is tolerating medication with positive results.      With out this clinical information prior authorization will be denied by the insurance.     Once patient has an appointment completed please send back request tot the prior authorization team.     Thank you

## 2025-07-21 ENCOUNTER — OFFICE VISIT (OUTPATIENT)
Age: 35
End: 2025-07-21
Payer: COMMERCIAL

## 2025-07-21 VITALS
DIASTOLIC BLOOD PRESSURE: 92 MMHG | BODY MASS INDEX: 41.46 KG/M2 | HEIGHT: 63 IN | OXYGEN SATURATION: 97 % | HEART RATE: 104 BPM | SYSTOLIC BLOOD PRESSURE: 124 MMHG | TEMPERATURE: 97.8 F | WEIGHT: 234 LBS

## 2025-07-21 DIAGNOSIS — E66.01 MORBID OBESITY WITH BMI OF 40.0-44.9, ADULT (HCC): ICD-10-CM

## 2025-07-21 DIAGNOSIS — M41.9 SCOLIOSIS OF LUMBAR SPINE, UNSPECIFIED SCOLIOSIS TYPE: Primary | ICD-10-CM

## 2025-07-21 PROCEDURE — 99214 OFFICE O/P EST MOD 30 MIN: CPT | Performed by: FAMILY MEDICINE

## 2025-07-21 NOTE — PROGRESS NOTES
"Name: Cruz Schneider      : 1990      MRN: [id number]  Encounter Provider: Adan Beckham DO  Encounter Date: 2025   Encounter department: [facility]  :  Assessment & Plan  Scoliosis of lumbar spine, unspecified scoliosis type  Patient will be seeing Ortho.  Patient with history of leg length discrepancy.       Morbid obesity with BMI of 40.0-44.9, adult (HCC)    Continue with Wegovy at this time.  Given patient having food aversion will continue with current dose.  Patient is losing weight.              History of Present Illness   Patient is here to follow-up on scoliosis.  Patient did have x-ray done.  Patient is seeing Ortho next month.  Patient also following up on weight.  Patient has lost 50 pounds.  Patient on Wegovy 0.5 mg      Review of Systems   Constitutional: Negative.    HENT: Negative.     Eyes: Negative.    Respiratory: Negative.     Cardiovascular: Negative.    Gastrointestinal: Negative.    Endocrine: Negative.    Genitourinary: Negative.    Musculoskeletal:  Positive for back pain.   Skin: Negative.    Allergic/Immunologic: Negative.    Neurological: Negative.    Hematological: Negative.    Psychiatric/Behavioral: Negative.         Objective   /92 (BP Location: Right arm, Patient Position: Sitting, Cuff Size: Large)   Pulse 104   Temp 97.8 °F (36.6 °C) (Temporal)   Ht 5' 3\" (1.6 m)   Wt 106 kg (234 lb)   SpO2 97%   BMI 41.45 kg/m²      Physical Exam  Vitals and nursing note reviewed.   Constitutional:       General: She is not in acute distress.     Appearance: Normal appearance. She is well-developed. She is not ill-appearing, toxic-appearing or diaphoretic.   HENT:      Head: Normocephalic and atraumatic.      Right Ear: External ear normal.      Left Ear: External ear normal.      Nose: Nose normal.     Eyes:      General:         Right eye: No discharge.         Left eye: No discharge.     Neck:      Thyroid: No thyromegaly.      Vascular: No " carotid bruit.      Trachea: No tracheal deviation.     Cardiovascular:      Rate and Rhythm: Normal rate and regular rhythm.      Pulses: Normal pulses.      Heart sounds: Normal heart sounds. No murmur heard.     No gallop.   Pulmonary:      Effort: Pulmonary effort is normal. No respiratory distress.      Breath sounds: Normal breath sounds. No stridor. No wheezing or rales.   Chest:      Chest wall: No tenderness.   Abdominal:      General: Bowel sounds are normal.     Musculoskeletal:         General: No tenderness. Normal range of motion.      Cervical back: Normal range of motion and neck supple.      Right lower leg: No edema.      Left lower leg: No edema.   Lymphadenopathy:      Cervical: No cervical adenopathy.     Skin:     General: Skin is warm and dry.      Capillary Refill: Capillary refill takes less than 2 seconds.      Coloration: Skin is not pale.      Findings: No erythema or rash.     Neurological:      Mental Status: She is alert and oriented to person, place, and time. Mental status is at baseline.      Motor: No abnormal muscle tone.     Psychiatric:         Mood and Affect: Mood normal.         Behavior: Behavior normal.         Thought Content: Thought content normal.         Judgment: Judgment normal.

## 2025-07-21 NOTE — ASSESSMENT & PLAN NOTE
{If prescribing weight loss medication, click here to fill out prior auth smartform and then hit F2 with this smartlist to insert prior auth documentation (Optional):[id number]}  Continue with Wegovy at this time.  Given patient having food aversion will continue with current dose.  Patient is losing weight.

## 2025-07-22 NOTE — TELEPHONE ENCOUNTER
PA for Wegovy 0.5mg SUBMITTED to     via    [x]CMM-KEY: [id number]  []Surescripts-Case ID #   []Availity-Auth ID # NDC #   []Faxed to plan   []Other website   []Phone call Case ID #     [x]PA sent as URGENT    All office notes, labs and other pertaining documents and studies sent. Clinical questions answered. Awaiting determination from insurance company.     Turnaround time for your insurance to make a decision on your Prior Authorization can take 7-21 business days.

## 2025-07-22 NOTE — TELEPHONE ENCOUNTER
PA for Wegovy ALL STRENGTHS APPROVED     Date(s) approved 07/22/2025-01/22/2026    Patient advised by          [x]MyChart Message  []Phone call   []LMOM  []L/M to call office as no active Communication consent on file  [x]Unable to leave detailed message as VM not approved on Communication consent       Pharmacy advised by    [x]Fax  []Phone call  []Secure Chat    Approval letter scanned into Media Yes

## 2025-07-29 DIAGNOSIS — M45.9 ANKYLOSING SPONDYLITIS, UNSPECIFIED SITE OF SPINE (HCC): ICD-10-CM

## 2025-07-29 DIAGNOSIS — M79.7 FIBROMYALGIA SYNDROME: ICD-10-CM

## 2025-07-30 DIAGNOSIS — M79.7 FIBROMYALGIA SYNDROME: ICD-10-CM

## 2025-07-30 DIAGNOSIS — M45.9 ANKYLOSING SPONDYLITIS, UNSPECIFIED SITE OF SPINE (HCC): ICD-10-CM

## 2025-07-30 RX ORDER — GABAPENTIN 300 MG/1
300 CAPSULE ORAL 3 TIMES DAILY
Qty: 90 CAPSULE | Refills: 5 | Status: SHIPPED | OUTPATIENT
Start: 2025-07-30

## 2025-07-30 RX ORDER — GABAPENTIN 300 MG/1
300 CAPSULE ORAL 3 TIMES DAILY
Qty: 90 CAPSULE | Refills: 0 | OUTPATIENT
Start: 2025-07-30

## 2025-08-01 ENCOUNTER — TELEMEDICINE (OUTPATIENT)
Dept: BEHAVIORAL/MENTAL HEALTH CLINIC | Facility: CLINIC | Age: 35
End: 2025-08-01
Payer: COMMERCIAL

## 2025-08-01 DIAGNOSIS — F41.1 GENERALIZED ANXIETY DISORDER: Chronic | ICD-10-CM

## 2025-08-01 DIAGNOSIS — F33.1 DEPRESSION, MAJOR, RECURRENT, MODERATE (HCC): Primary | Chronic | ICD-10-CM

## 2025-08-01 PROCEDURE — 90834 PSYTX W PT 45 MINUTES: CPT | Performed by: SOCIAL WORKER

## 2025-08-04 ENCOUNTER — TELEPHONE (OUTPATIENT)
Dept: PSYCHIATRY | Facility: CLINIC | Age: 35
End: 2025-08-04

## 2025-08-04 DIAGNOSIS — K21.9 GASTROESOPHAGEAL REFLUX DISEASE WITHOUT ESOPHAGITIS: ICD-10-CM

## 2025-08-05 ENCOUNTER — TELEPHONE (OUTPATIENT)
Age: 35
End: 2025-08-05

## 2025-08-05 DIAGNOSIS — F33.1 DEPRESSION, MAJOR, RECURRENT, MODERATE (HCC): Chronic | ICD-10-CM

## 2025-08-05 RX ORDER — RABEPRAZOLE SODIUM 20 MG/1
20 TABLET, DELAYED RELEASE ORAL 2 TIMES DAILY
Qty: 60 TABLET | Refills: 0 | Status: SHIPPED | OUTPATIENT
Start: 2025-08-05

## 2025-08-05 RX ORDER — DULOXETIN HYDROCHLORIDE 60 MG/1
60 CAPSULE, DELAYED RELEASE ORAL DAILY
Qty: 30 CAPSULE | Refills: 2 | Status: SHIPPED | OUTPATIENT
Start: 2025-08-05

## 2025-08-07 ENCOUNTER — NURSE TRIAGE (OUTPATIENT)
Age: 35
End: 2025-08-07

## 2025-08-07 ENCOUNTER — APPOINTMENT (OUTPATIENT)
Dept: LAB | Facility: CLINIC | Age: 35
End: 2025-08-07

## 2025-08-07 ENCOUNTER — TELEPHONE (OUTPATIENT)
Dept: NEUROLOGY | Facility: CLINIC | Age: 35
End: 2025-08-07

## 2025-08-07 DIAGNOSIS — R10.84 GENERALIZED ABDOMINAL PAIN: Primary | ICD-10-CM

## 2025-08-07 DIAGNOSIS — K58.0 IRRITABLE BOWEL SYNDROME WITH DIARRHEA: Primary | ICD-10-CM

## 2025-08-07 RX ORDER — DICYCLOMINE HYDROCHLORIDE 10 MG/1
10 CAPSULE ORAL 3 TIMES DAILY PRN
Qty: 90 CAPSULE | Refills: 2 | Status: SHIPPED | OUTPATIENT
Start: 2025-08-07

## 2025-08-07 RX ORDER — HYOSCYAMINE SULFATE 0.12 MG/1
0.12 TABLET ORAL EVERY 6 HOURS PRN
Qty: 28 TABLET | Refills: 0 | Status: SHIPPED | OUTPATIENT
Start: 2025-08-07

## 2025-08-12 ENCOUNTER — PROCEDURE VISIT (OUTPATIENT)
Dept: NEUROLOGY | Facility: CLINIC | Age: 35
End: 2025-08-12
Payer: COMMERCIAL

## 2025-08-13 PROBLEM — S81.811A LACERATION OF RIGHT LOWER EXTREMITY: Status: RESOLVED | Noted: 2025-07-14 | Resolved: 2025-08-13

## 2025-08-18 DIAGNOSIS — E66.813 CLASS 3 SEVERE OBESITY DUE TO EXCESS CALORIES WITH BODY MASS INDEX (BMI) OF 45.0 TO 49.9 IN ADULT: ICD-10-CM

## 2025-08-18 RX ORDER — SEMAGLUTIDE 0.5 MG/.5ML
INJECTION, SOLUTION SUBCUTANEOUS
Qty: 4 ML | Refills: 0 | Status: SHIPPED | OUTPATIENT
Start: 2025-08-18

## 2025-08-22 ENCOUNTER — PATIENT MESSAGE (OUTPATIENT)
Age: 35
End: 2025-08-22

## 2025-08-22 DIAGNOSIS — E66.813 CLASS 3 SEVERE OBESITY DUE TO EXCESS CALORIES WITH BODY MASS INDEX (BMI) OF 45.0 TO 49.9 IN ADULT: Primary | ICD-10-CM

## 2025-08-22 RX ORDER — SEMAGLUTIDE 0.25 MG/.5ML
INJECTION, SOLUTION SUBCUTANEOUS
Qty: 2 ML | Refills: 0 | Status: SHIPPED | OUTPATIENT
Start: 2025-08-22

## (undated) DEVICE — ASTOUND IMPERVIOUS SURGICAL GOWN: Brand: CONVERTORS

## (undated) DEVICE — SCD SEQUENTIAL COMPRESSION COMFORT SLEEVE MEDIUM KNEE LENGTH: Brand: KENDALL SCD

## (undated) DEVICE — SUT MONOCRYL 4-0 PS-2 27 IN Y426H

## (undated) DEVICE — DISPOSABLE OR TOWEL: Brand: CARDINAL HEALTH

## (undated) DEVICE — GAUZE SPONGES,8 PLY: Brand: CURITY

## (undated) DEVICE — SKIN MARKER DUAL TIP WITH RULER CAP, FLEXIBLE RULER AND LABELS: Brand: DEVON

## (undated) DEVICE — SPONGE STICK WITH PVP-I: Brand: KENDALL

## (undated) DEVICE — LAPROSCOPIC CHOLANGIOGR. CATH KIT

## (undated) DEVICE — 3M™ TEGADERM™ TRANSPARENT FILM DRESSING FRAME STYLE, 1624W, 2-3/8 IN X 2-3/4 IN (6 CM X 7 CM), 100/CT 4CT/CASE: Brand: 3M™ TEGADERM™

## (undated) DEVICE — GLOVE SRG BIOGEL 7.5

## (undated) DEVICE — CHLORAPREP HI-LITE 26ML ORANGE

## (undated) DEVICE — NEEDLE BLUNT 18 G X 1 1/2IN

## (undated) DEVICE — STERILE POLYISOPRENE POWDER-FREE SURGICAL GLOVES: Brand: PROTEXIS

## (undated) DEVICE — SYRINGE 20ML LL

## (undated) DEVICE — TROCAR: Brand: KII FIOS FIRST ENTRY

## (undated) DEVICE — SUT VICRYL PLUS 0 UR-6 27IN VCP603H

## (undated) DEVICE — ENDOPOUCH RETRIEVER SPECIMEN RETRIEVAL BAGS: Brand: ENDOPOUCH RETRIEVER

## (undated) DEVICE — LIGHT GLOVE GREEN

## (undated) DEVICE — LAPAROSCOPIC SCISSORS: Brand: EPIX LAPAROSCOPIC SCISSORS

## (undated) DEVICE — PENCIL ELECTROSURG E-Z CLEAN -0035H

## (undated) DEVICE — LUBRICANT SURGILUBE TUBE 4 OZ  FLIP TOP

## (undated) DEVICE — GLOVE INDICATOR PI UNDERGLOVE SZ 8 BLUE

## (undated) DEVICE — SPONGE 4 X 4 XRAY 16 PLY STRL LF RFD

## (undated) DEVICE — NEEDLE 25G X 1 1/2

## (undated) DEVICE — MICRO HVTSA, 0.5G AND HVTSA SOURCEMARK PRODUCT CODE M1206 AND M1206-01: Brand: EXOFIN MICRO HVTSA, 0.5G

## (undated) DEVICE — 1820 FOAM BLOCK NEEDLE COUNTER: Brand: DEVON

## (undated) DEVICE — HARMONIC 1100 SHEARS, 36CM SHAFT LENGTH: Brand: HARMONIC

## (undated) DEVICE — GARMENT,MEDLINE,DVT,INT,CALF,FOAM,MED: Brand: MEDLINE

## (undated) DEVICE — BULB SYRINGE,IRRIGATION WITH PROTECTIVE CAP: Brand: DOVER

## (undated) DEVICE — TUBE SET SMOKE EVAC PNEUMOCLEAR HIGH FLOW

## (undated) DEVICE — ENDOPATH XCEL BLADELESS TROCARS WITH STABILITY SLEEVES: Brand: ENDOPATH XCEL

## (undated) DEVICE — SYRINGE 30ML LL

## (undated) DEVICE — GLOVE SRG LF STRL BGL SKNSNS 6.5 PF

## (undated) DEVICE — STANDARD SURGICAL GOWN, L: Brand: CONVERTORS

## (undated) DEVICE — INTENDED FOR TISSUE SEPARATION, AND OTHER PROCEDURES THAT REQUIRE A SHARP SURGICAL BLADE TO PUNCTURE OR CUT.: Brand: BARD-PARKER ® SAFETYLOCK CARBON RIB-BACK BLADES

## (undated) DEVICE — TROCAR SITE CLOSURE DEVICE: Brand: ENDO CLOSE

## (undated) DEVICE — INTENDED FOR TISSUE SEPARATION, AND OTHER PROCEDURES THAT REQUIRE A SHARP SURGICAL BLADE TO PUNCTURE OR CUT.: Brand: BARD-PARKER ® CARBON RIB-BACK BLADES

## (undated) DEVICE — C-ARM: Brand: UNBRANDED

## (undated) DEVICE — DRAPE EQUIPMENT RF WAND

## (undated) DEVICE — BASIC PACK: Brand: CONVERTORS

## (undated) DEVICE — PROXIMATE SKIN STAPLERS (35 WIDE) CONTAINS 35 STAINLESS STEEL STAPLES (FIXED HEAD): Brand: PROXIMATE

## (undated) DEVICE — ADHESIVE SKIN HIGH VISCOSITY EXOFIN 1ML

## (undated) DEVICE — SUT PROLENE 1 CT-1 30 IN 8425H

## (undated) DEVICE — SYRINGE 10ML LL CONTROL TOP

## (undated) DEVICE — IV CATH 14 G X 1.75

## (undated) DEVICE — GLOVE INDICATOR PI UNDERGLOVE SZ 7.5 BLUE

## (undated) DEVICE — SUT VICRYL 3-0 SH 27 IN J416H

## (undated) DEVICE — CLIP APPLIER WITH CLIP LOGIC TECHNOLOGY: Brand: ENDO CLIP III

## (undated) DEVICE — 5 MM CURVED DISSECTORS WITH MONOPOLAR CAUTERY: Brand: ENDOPATH

## (undated) DEVICE — GLOVE SRG BIOGEL 7